# Patient Record
Sex: MALE | Race: WHITE | Employment: OTHER | URBAN - METROPOLITAN AREA
[De-identification: names, ages, dates, MRNs, and addresses within clinical notes are randomized per-mention and may not be internally consistent; named-entity substitution may affect disease eponyms.]

---

## 2018-12-20 PROBLEM — D75.1 POLYCYTHEMIA: Status: ACTIVE | Noted: 2018-12-20

## 2019-04-04 DIAGNOSIS — D75.1 POLYCYTHEMIA: ICD-10-CM

## 2019-04-04 LAB
ALBUMIN SERPL-MCNC: 4.2 G/DL (ref 3.5–4.6)
ALP BLD-CCNC: 58 U/L (ref 35–104)
ALT SERPL-CCNC: 31 U/L (ref 0–41)
ANION GAP SERPL CALCULATED.3IONS-SCNC: 15 MEQ/L (ref 9–15)
AST SERPL-CCNC: 35 U/L (ref 0–40)
BILIRUB SERPL-MCNC: 0.3 MG/DL (ref 0.2–0.7)
BUN BLDV-MCNC: 22 MG/DL (ref 8–23)
CALCIUM SERPL-MCNC: 9.3 MG/DL (ref 8.5–9.9)
CHLORIDE BLD-SCNC: 103 MEQ/L (ref 95–107)
CO2: 23 MEQ/L (ref 20–31)
CREAT SERPL-MCNC: 1.21 MG/DL (ref 0.7–1.2)
GFR AFRICAN AMERICAN: >60
GFR NON-AFRICAN AMERICAN: 57.4
GLOBULIN: 3 G/DL (ref 2.3–3.5)
GLUCOSE BLD-MCNC: 93 MG/DL (ref 70–99)
POTASSIUM SERPL-SCNC: 4.8 MEQ/L (ref 3.4–4.9)
SODIUM BLD-SCNC: 141 MEQ/L (ref 135–144)
TOTAL PROTEIN: 7.2 G/DL (ref 6.3–8)

## 2020-03-25 PROBLEM — D75.1 POLYCYTHEMIA: Status: RESOLVED | Noted: 2018-12-20 | Resolved: 2020-03-24

## 2020-12-01 ENCOUNTER — NURSE ONLY (OUTPATIENT)
Dept: PRIMARY CARE CLINIC | Age: 85
End: 2020-12-01

## 2021-01-26 PROBLEM — M48.062 SPINAL STENOSIS OF LUMBAR REGION WITH NEUROGENIC CLAUDICATION: Status: ACTIVE | Noted: 2021-01-26

## 2021-02-02 ENCOUNTER — NURSE ONLY (OUTPATIENT)
Dept: PRIMARY CARE CLINIC | Age: 86
End: 2021-02-02

## 2021-02-02 ENCOUNTER — HOSPITAL ENCOUNTER (OUTPATIENT)
Dept: PREADMISSION TESTING | Age: 86
Discharge: HOME | End: 2021-02-06
Payer: MEDICARE

## 2021-02-02 VITALS
OXYGEN SATURATION: 98 % | RESPIRATION RATE: 16 BRPM | BODY MASS INDEX: 38.07 KG/M2 | HEIGHT: 68 IN | WEIGHT: 251.2 LBS | SYSTOLIC BLOOD PRESSURE: 149 MMHG | TEMPERATURE: 98.1 F | HEART RATE: 70 BPM | DIASTOLIC BLOOD PRESSURE: 64 MMHG

## 2021-02-02 DIAGNOSIS — Z86.718 HX OF BLOOD CLOTS: Chronic | ICD-10-CM

## 2021-02-02 DIAGNOSIS — Z87.891 FORMER SMOKER: Chronic | ICD-10-CM

## 2021-02-02 PROBLEM — I73.9 INTERMITTENT CLAUDICATION (HCC): Status: ACTIVE | Noted: 2018-12-10

## 2021-02-02 PROBLEM — G45.9 TRANSIENT ISCHEMIC ATTACK: Status: ACTIVE | Noted: 2019-03-26

## 2021-02-02 PROBLEM — G47.30 SLEEP APNEA: Status: ACTIVE | Noted: 2021-02-02

## 2021-02-02 PROBLEM — I70.213 ATHEROSCLEROSIS OF NATIVE ARTERY OF BOTH LOWER EXTREMITIES WITH INTERMITTENT CLAUDICATION (HCC): Status: ACTIVE | Noted: 2021-02-02

## 2021-02-02 PROBLEM — I48.91 ATRIAL FIBRILLATION (HCC): Status: ACTIVE | Noted: 2021-02-02

## 2021-02-02 PROBLEM — L71.9 ROSACEA: Status: ACTIVE | Noted: 2018-01-22

## 2021-02-02 PROBLEM — I87.2 VENOUS INSUFFICIENCY: Status: ACTIVE | Noted: 2021-02-02

## 2021-02-02 PROBLEM — G47.19 EXCESSIVE DAYTIME SLEEPINESS: Status: ACTIVE | Noted: 2021-02-02

## 2021-02-02 LAB
ANION GAP SERPL CALCULATED.3IONS-SCNC: 11 MEQ/L (ref 9–15)
BUN BLDV-MCNC: 28 MG/DL (ref 8–23)
CALCIUM SERPL-MCNC: 9.3 MG/DL (ref 8.5–9.9)
CHLORIDE BLD-SCNC: 106 MEQ/L (ref 95–107)
CO2: 24 MEQ/L (ref 20–31)
CREAT SERPL-MCNC: 1.02 MG/DL (ref 0.7–1.2)
EKG ATRIAL RATE: 68 BPM
EKG P AXIS: 51 DEGREES
EKG P-R INTERVAL: 224 MS
EKG Q-T INTERVAL: 382 MS
EKG QRS DURATION: 80 MS
EKG QTC CALCULATION (BAZETT): 406 MS
EKG R AXIS: 43 DEGREES
EKG T AXIS: 50 DEGREES
EKG VENTRICULAR RATE: 68 BPM
GFR AFRICAN AMERICAN: >60
GFR NON-AFRICAN AMERICAN: >60
GLUCOSE BLD-MCNC: 141 MG/DL (ref 70–99)
HCT VFR BLD CALC: 44.8 % (ref 42–52)
HEMOGLOBIN: 14.8 G/DL (ref 14–18)
INR BLD: 1.9
MCH RBC QN AUTO: 30 PG (ref 27–31.3)
MCHC RBC AUTO-ENTMCNC: 33 % (ref 33–37)
MCV RBC AUTO: 91 FL (ref 80–100)
PDW BLD-RTO: 15.2 % (ref 11.5–14.5)
PLATELET # BLD: 209 K/UL (ref 130–400)
POTASSIUM SERPL-SCNC: 4.2 MEQ/L (ref 3.4–4.9)
PROTHROMBIN TIME: 21.6 SEC (ref 12.3–14.9)
RBC # BLD: 4.92 M/UL (ref 4.7–6.1)
SODIUM BLD-SCNC: 141 MEQ/L (ref 135–144)
WBC # BLD: 6.8 K/UL (ref 4.8–10.8)

## 2021-02-02 PROCEDURE — 86850 RBC ANTIBODY SCREEN: CPT

## 2021-02-02 PROCEDURE — 86901 BLOOD TYPING SEROLOGIC RH(D): CPT

## 2021-02-02 PROCEDURE — 85610 PROTHROMBIN TIME: CPT

## 2021-02-02 PROCEDURE — 93005 ELECTROCARDIOGRAM TRACING: CPT | Performed by: NURSE PRACTITIONER

## 2021-02-02 PROCEDURE — 86900 BLOOD TYPING SEROLOGIC ABO: CPT

## 2021-02-02 PROCEDURE — 85027 COMPLETE CBC AUTOMATED: CPT

## 2021-02-02 PROCEDURE — 80048 BASIC METABOLIC PNL TOTAL CA: CPT

## 2021-02-02 RX ORDER — SODIUM CHLORIDE 0.9 % (FLUSH) 0.9 %
10 SYRINGE (ML) INJECTION PRN
Status: CANCELLED | OUTPATIENT
Start: 2021-02-08

## 2021-02-02 RX ORDER — NITROGLYCERIN 0.4 MG/1
0.4 TABLET SUBLINGUAL EVERY 5 MIN PRN
COMMUNITY

## 2021-02-02 RX ORDER — SODIUM CHLORIDE, SODIUM LACTATE, POTASSIUM CHLORIDE, CALCIUM CHLORIDE 600; 310; 30; 20 MG/100ML; MG/100ML; MG/100ML; MG/100ML
INJECTION, SOLUTION INTRAVENOUS CONTINUOUS
Status: CANCELLED | OUTPATIENT
Start: 2021-02-08

## 2021-02-02 RX ORDER — SODIUM CHLORIDE, SODIUM LACTATE, POTASSIUM CHLORIDE, CALCIUM CHLORIDE 600; 310; 30; 20 MG/100ML; MG/100ML; MG/100ML; MG/100ML
INJECTION, SOLUTION INTRAVENOUS ONCE
Status: CANCELLED | OUTPATIENT
Start: 2021-02-08

## 2021-02-02 RX ORDER — CEFAZOLIN SODIUM 2 G/50ML
2000 SOLUTION INTRAVENOUS ONCE
Status: CANCELLED | OUTPATIENT
Start: 2021-02-08

## 2021-02-02 RX ORDER — LIDOCAINE HYDROCHLORIDE 10 MG/ML
1 INJECTION, SOLUTION EPIDURAL; INFILTRATION; INTRACAUDAL; PERINEURAL
Status: CANCELLED | OUTPATIENT
Start: 2021-02-08 | End: 2021-02-08

## 2021-02-02 RX ORDER — PRAVASTATIN SODIUM 20 MG
TABLET ORAL EVERY EVENING
COMMUNITY
Start: 2020-12-01 | End: 2022-08-08 | Stop reason: ALTCHOICE

## 2021-02-02 RX ORDER — SODIUM CHLORIDE 0.9 % (FLUSH) 0.9 %
10 SYRINGE (ML) INJECTION EVERY 12 HOURS SCHEDULED
Status: CANCELLED | OUTPATIENT
Start: 2021-02-08

## 2021-02-02 RX ORDER — AMOXICILLIN 500 MG
CAPSULE ORAL DAILY
COMMUNITY

## 2021-02-02 RX ORDER — FINASTERIDE 5 MG/1
5 TABLET, FILM COATED ORAL DAILY
COMMUNITY
End: 2021-02-02

## 2021-02-02 RX ORDER — TIZANIDINE 4 MG/1
4 TABLET ORAL NIGHTLY
COMMUNITY
End: 2022-07-25

## 2021-02-02 RX ORDER — WARFARIN SODIUM 2.5 MG/1
4 TABLET ORAL DAILY
COMMUNITY

## 2021-02-02 RX ORDER — LOSARTAN POTASSIUM 50 MG/1
50 TABLET ORAL DAILY
COMMUNITY
End: 2022-07-25

## 2021-02-02 RX ORDER — DUTASTERIDE 0.5 MG/1
CAPSULE, LIQUID FILLED ORAL DAILY
COMMUNITY

## 2021-02-02 ASSESSMENT — ENCOUNTER SYMPTOMS
EYES NEGATIVE: 1
CONSTIPATION: 0
SHORTNESS OF BREATH: 1
NAUSEA: 0
SORE THROAT: 0
ABDOMINAL PAIN: 0
CHEST TIGHTNESS: 0
COUGH: 0
DIARRHEA: 0
VOMITING: 0
TROUBLE SWALLOWING: 0
BACK PAIN: 1
STRIDOR: 0
WHEEZING: 0
ALLERGIC/IMMUNOLOGIC NEGATIVE: 1

## 2021-02-02 NOTE — H&P
Nurse Practitioner History and Physical      CHIEF COMPLAINT:  Back pain    HISTORY OF PRESENT ILLNESS:      The patient is a 82 y.o. male with significant past medical history of lumbar stenosis who presents for right bilateral lumbar 2 - 3, 3 - 4, 4 - 5 micro decompression. Difficult ambulation & progressively worsening. Low back pain radiates to both legs -- right leg pain worse than left leg pain. Uses a cane for ambulation. Scheduled for OR. Past Medical History:        Diagnosis Date    Baker's cyst of knee, left     Coronary artery disease     HTN (hypertension)     Hyperlipidemia     Polycythemia     Polycythemia rubra vera ([facility] [address]     Torn meniscus     left knee     Past Surgical History:    Past Surgical History:   Procedure Laterality Date    COLONOSCOPY  2009    COLONOSCOPY  2012    CORONARY ANGIOPLASTY WITH STENT PLACEMENT  10/2006    HERNIA REPAIR  03/2013    redo ca mesh in Oct.2013    JOINT REPLACEMENT Right 2011    KNEE SURGERY Left      arthroscopic surgery to repair meniscus of left knee    TONSILLECTOMY  age 8   [address]  03/2012         Medications Prior to Admission:    Current Outpatient Medications   Medication Sig Dispense Refill    HYDROcodone-acetaminophen (VICODIN) 5-500 MG per tablet       carvedilol (COREG) 3.125 MG tablet       AVODART 0.5 MG capsule       predniSONE (DELTASONE) 20 MG tablet       oxyCODONE-acetaminophen (PERCOCET) 5-325 MG per tablet       tamsulosin (FLOMAX) 0.4 MG capsule       warfarin (COUMADIN) 5 MG tablet       amlodipine (NORVASC) 5 MG tablet Take 5 mg by mouth daily.  doxazosin (CARDURA) 1 MG tablet Take 1 mg by mouth nightly.  Dutasteride (AVODART PO) Take  by mouth.  atorvastatin (LIPITOR) 20 MG tablet Take 20 mg by mouth daily.  furosemide (LASIX) 20 MG tablet Take 20 mg by mouth 2 times daily.  aspirin 81 MG EC tablet Take 81 mg by mouth daily.          No current facility-administered medications for this encounter.       Facility-Administered Medications Ordered in Other Encounters   Medication Dose Route Frequency Provider Last Rate Last Admin    sodium chloride flush 0.9 % injection 10 mL  10 mL Intravenous PRN Elier Comer MD   10 mL at 07/10/19 1220       Allergies:  Acetaminophen and Benadryl [diphenhydramine hcl]    Social History:   Social History     Socioeconomic History    Marital status:      Spouse name: Not on file    Number of children: Not on file    Years of education: Not on file    Highest education level: Not on file   Occupational History    Not on file   Social Needs    Financial resource strain: Not on file    Food insecurity     Worry: Not on file     Inability: Not on file    Transportation needs     Medical: Not on file     Non-medical: Not on file   Tobacco Use    Smoking status: Former Smoker     Quit date: 1968     Years since quittin.8    Smokeless tobacco: Never Used   Substance and Sexual Activity    Alcohol use: Not on file    Drug use: Not on file    Sexual activity: Not on file   Lifestyle    Physical activity     Days per week: Not on file     Minutes per session: Not on file    Stress: Not on file   Relationships    Social connections     Talks on phone: Not on file     Gets together: Not on file     Attends Hoahaoism service: Not on file     Active member of club or organization: Not on file     Attends meetings of clubs or organizations: Not on file     Relationship status: Not on file    Intimate partner violence     Fear of current or ex partner: Not on file     Emotionally abused: Not on file     Physically abused: Not on file     Forced sexual activity: Not on file   Other Topics Concern    Not on file   Social History Narrative    Not on file       Family History:       Problem Relation Age of Onset    Heart Attack Mother     Colon Cancer Other     Colon Cancer Daughter     Breast Cancer Other        Review of Systems   Constitutional: Negative for chills and fever. HENT: Negative for sore throat, tinnitus and trouble swallowing. Eyes: Negative. Negative for visual disturbance. IOL OU   Respiratory: Positive for shortness of breath. Negative for cough, chest tightness, wheezing and stridor. Cardiovascular: Negative for chest pain and palpitations. Gastrointestinal: Negative for abdominal pain, constipation, diarrhea, nausea and vomiting. Genitourinary: Negative for dysuria and frequency. BPH   Musculoskeletal: Positive for back pain (radiates to both legs. ). Negative for myalgias and neck pain. Skin: Negative. Allergic/Immunologic: Negative. Neurological: Negative. Negative for seizures and headaches. Hematological: Negative. Psychiatric/Behavioral: Negative. Vitals: There were no vitals taken for this visit. Physical Exam  Constitutional:       Appearance: He is well-developed. He is obese. HENT:      Head: Normocephalic and atraumatic. Right Ear: External ear normal. There is impacted cerumen. Left Ear: External ear normal. There is impacted cerumen. Mouth/Throat:      Mouth: Mucous membranes are dry. Comments: Full upper & lower dentures -- ill fitting. Eyes:      General: No scleral icterus. Extraocular Movements: Extraocular movements intact. Conjunctiva/sclera: Conjunctivae normal.      Pupils: Pupils are equal, round, and reactive to light. Comments: IOL OU   Neck:      Musculoskeletal: Normal range of motion and neck supple. Thyroid: No thyromegaly. Trachea: No tracheal deviation. Cardiovascular:      Rate and Rhythm: Normal rate and regular rhythm. Heart sounds: Normal heart sounds. Pulmonary:      Effort: Pulmonary effort is normal. No respiratory distress. Breath sounds: Normal breath sounds. No wheezing or rales.    Abdominal:      General: Bowel sounds are normal. There is no distension. Palpations: Abdomen is soft. There is no mass. Tenderness: There is no abdominal tenderness. Comments: Obese abdomen. Genitourinary:     Comments: Deferred. Musculoskeletal: Normal range of motion. General: No tenderness. Right lower leg: Edema present. Left lower leg: Edema (elastic hose LLE) present. Lymphadenopathy:      Cervical: No cervical adenopathy. Skin:     General: Skin is warm. Findings: No erythema or rash. Neurological:      Mental Status: He is alert and oriented to person, place, and time. Gait: Gait abnormal (painful wobbling ambulation. ). Psychiatric:         Mood and Affect: Mood normal.         Behavior: Behavior normal.         Thought Content: Thought content normal.         Judgment: Judgment normal.         [unfilled]    Assessment:  Patient Active Problem List   Diagnosis    Ventral hernia    Ventral hernia, recurrent    Polycythemia    Spinal stenosis of lumbar region with neurogenic claudication         Plan:  Scheduled for right bilateral lumbar 2 - 3, 3 - 4, 4 - 5 micro decompression. EDELMIRA Huffman - CNP  2/2/2021  12:44 PM     Entire history and physical have been reviewed including all laboratory studies images. Updates and changes have been noted. Discussed with the patient. All questions answered.

## 2021-02-02 NOTE — PROGRESS NOTES
covid test to be done 2/2/2021 & will self quarantine until OR 2/8/2021. Dianelys-Hex wash instructions given -- to be done Saturday 2/6/2021 & Sunday 2/7/2021. covid positive 11/2020. Last dose Coumadin & Aspirin 1/29/2021. covid vaccine [employer] ) injection 2/2/2021. Cardiac clearance by Dr. Isaac Button dated 1/28/2021 -- paper copy on chart. Last cardiac appointment notes [facility] ) dated 12/1/2020 -- paper copy on chart.

## 2021-02-02 NOTE — PROGRESS NOTES
covid test to be done 2/2/2021 & will self quarantine until OR 2/8/2021. Dianelys-Hex wash instructions given -- to be done Saturday 2/6/2021 & Sunday 2/7/2021. covid positive 11/2020. Last dose Coumadin & Aspirin 1/29/2021.

## 2021-02-03 LAB
ABO/RH: NORMAL
ANTIBODY SCREEN: NORMAL

## 2021-02-03 PROCEDURE — 93010 ELECTROCARDIOGRAM REPORT: CPT | Performed by: INTERNAL MEDICINE

## 2021-02-08 ENCOUNTER — ANESTHESIA EVENT (OUTPATIENT)
Dept: OPERATING ROOM | Age: 86
End: 2021-02-08
Payer: MEDICARE

## 2021-02-08 ENCOUNTER — HOSPITAL ENCOUNTER (OUTPATIENT)
Dept: GENERAL RADIOLOGY | Age: 86
Setting detail: OUTPATIENT SURGERY
Discharge: HOME | End: 2021-02-10
Attending: NEUROLOGICAL SURGERY
Payer: MEDICARE

## 2021-02-08 ENCOUNTER — HOSPITAL ENCOUNTER (OUTPATIENT)
Age: 86
Discharge: HOME | End: 2021-02-09
Attending: NEUROLOGICAL SURGERY | Admitting: NEUROLOGICAL SURGERY
Payer: MEDICARE

## 2021-02-08 ENCOUNTER — ANESTHESIA (OUTPATIENT)
Dept: OPERATING ROOM | Age: 86
End: 2021-02-08
Payer: MEDICARE

## 2021-02-08 VITALS — TEMPERATURE: 93.9 F | SYSTOLIC BLOOD PRESSURE: 136 MMHG | OXYGEN SATURATION: 96 % | DIASTOLIC BLOOD PRESSURE: 86 MMHG

## 2021-02-08 DIAGNOSIS — R52 PAIN: ICD-10-CM

## 2021-02-08 PROBLEM — M48.062 LUMBAR STENOSIS WITH NEUROGENIC CLAUDICATION: Status: ACTIVE | Noted: 2021-02-08

## 2021-02-08 LAB
INR BLD: 1
PROTHROMBIN TIME: 12.9 SEC (ref 12.3–14.9)

## 2021-02-08 PROCEDURE — 85610 PROTHROMBIN TIME: CPT

## 2021-02-08 PROCEDURE — 6360000002 HC RX W HCPCS: Performed by: NURSE PRACTITIONER

## 2021-02-08 PROCEDURE — 6360000002 HC RX W HCPCS: Performed by: ANESTHESIOLOGY

## 2021-02-08 PROCEDURE — 6360000002 HC RX W HCPCS: Performed by: NEUROLOGICAL SURGERY

## 2021-02-08 PROCEDURE — 3700000001 HC ADD 15 MINUTES (ANESTHESIA): Performed by: NEUROLOGICAL SURGERY

## 2021-02-08 PROCEDURE — 6370000000 HC RX 637 (ALT 250 FOR IP): Performed by: NEUROLOGICAL SURGERY

## 2021-02-08 PROCEDURE — 6370000000 HC RX 637 (ALT 250 FOR IP): Performed by: NURSE PRACTITIONER

## 2021-02-08 PROCEDURE — 3600000004 HC SURGERY LEVEL 4 BASE: Performed by: NEUROLOGICAL SURGERY

## 2021-02-08 PROCEDURE — 2709999900 HC NON-CHARGEABLE SUPPLY: Performed by: NEUROLOGICAL SURGERY

## 2021-02-08 PROCEDURE — 6360000002 HC RX W HCPCS

## 2021-02-08 PROCEDURE — 2500000003 HC RX 250 WO HCPCS: Performed by: NEUROLOGICAL SURGERY

## 2021-02-08 PROCEDURE — 7100000000 HC PACU RECOVERY - FIRST 15 MIN: Performed by: NEUROLOGICAL SURGERY

## 2021-02-08 PROCEDURE — 2500000003 HC RX 250 WO HCPCS: Performed by: ANESTHESIOLOGY

## 2021-02-08 PROCEDURE — 2780000010 HC IMPLANT OTHER: Performed by: NEUROLOGICAL SURGERY

## 2021-02-08 PROCEDURE — 64999 UNLISTED PX NERVOUS SYSTEM: CPT

## 2021-02-08 PROCEDURE — 2580000003 HC RX 258: Performed by: NURSE PRACTITIONER

## 2021-02-08 PROCEDURE — 2500000003 HC RX 250 WO HCPCS

## 2021-02-08 PROCEDURE — 3700000000 HC ANESTHESIA ATTENDED CARE: Performed by: NEUROLOGICAL SURGERY

## 2021-02-08 PROCEDURE — 2720000010 HC SURG SUPPLY STERILE: Performed by: NEUROLOGICAL SURGERY

## 2021-02-08 PROCEDURE — 76942 ECHO GUIDE FOR BIOPSY: CPT | Performed by: ANESTHESIOLOGY

## 2021-02-08 PROCEDURE — 7100000001 HC PACU RECOVERY - ADDTL 15 MIN: Performed by: NEUROLOGICAL SURGERY

## 2021-02-08 PROCEDURE — 2580000003 HC RX 258: Performed by: NEUROLOGICAL SURGERY

## 2021-02-08 PROCEDURE — 3209999900 FLUORO FOR SURGICAL PROCEDURES

## 2021-02-08 PROCEDURE — 3600000014 HC SURGERY LEVEL 4 ADDTL 15MIN: Performed by: NEUROLOGICAL SURGERY

## 2021-02-08 RX ORDER — TAMSULOSIN HYDROCHLORIDE 0.4 MG/1
0.4 CAPSULE ORAL DAILY
Status: DISCONTINUED | OUTPATIENT
Start: 2021-02-08 | End: 2021-02-09 | Stop reason: HOSPADM

## 2021-02-08 RX ORDER — LOSARTAN POTASSIUM 50 MG/1
50 TABLET ORAL DAILY
Status: DISCONTINUED | OUTPATIENT
Start: 2021-02-08 | End: 2021-02-09 | Stop reason: HOSPADM

## 2021-02-08 RX ORDER — SODIUM CHLORIDE 9 MG/ML
INJECTION, SOLUTION INTRAVENOUS CONTINUOUS
Status: DISCONTINUED | OUTPATIENT
Start: 2021-02-08 | End: 2021-02-09 | Stop reason: HOSPADM

## 2021-02-08 RX ORDER — LIDOCAINE HYDROCHLORIDE AND EPINEPHRINE 10; 10 MG/ML; UG/ML
INJECTION, SOLUTION INFILTRATION; PERINEURAL PRN
Status: DISCONTINUED | OUTPATIENT
Start: 2021-02-08 | End: 2021-02-08 | Stop reason: ALTCHOICE

## 2021-02-08 RX ORDER — BUPIVACAINE HYDROCHLORIDE 2.5 MG/ML
INJECTION, SOLUTION EPIDURAL; INFILTRATION; INTRACAUDAL
Status: COMPLETED | OUTPATIENT
Start: 2021-02-08 | End: 2021-02-08

## 2021-02-08 RX ORDER — SODIUM CHLORIDE, SODIUM LACTATE, POTASSIUM CHLORIDE, CALCIUM CHLORIDE 600; 310; 30; 20 MG/100ML; MG/100ML; MG/100ML; MG/100ML
INJECTION, SOLUTION INTRAVENOUS ONCE
Status: COMPLETED | OUTPATIENT
Start: 2021-02-08 | End: 2021-02-08

## 2021-02-08 RX ORDER — CEFAZOLIN SODIUM 2 G/50ML
2000 SOLUTION INTRAVENOUS EVERY 8 HOURS
Status: COMPLETED | OUTPATIENT
Start: 2021-02-08 | End: 2021-02-09

## 2021-02-08 RX ORDER — SODIUM CHLORIDE, SODIUM LACTATE, POTASSIUM CHLORIDE, CALCIUM CHLORIDE 600; 310; 30; 20 MG/100ML; MG/100ML; MG/100ML; MG/100ML
INJECTION, SOLUTION INTRAVENOUS CONTINUOUS
Status: DISCONTINUED | OUTPATIENT
Start: 2021-02-08 | End: 2021-02-08

## 2021-02-08 RX ORDER — FUROSEMIDE 20 MG/1
20 TABLET ORAL DAILY
Status: DISCONTINUED | OUTPATIENT
Start: 2021-02-08 | End: 2021-02-09 | Stop reason: HOSPADM

## 2021-02-08 RX ORDER — SUCCINYLCHOLINE/SOD CL,ISO/PF 100 MG/5ML
SYRINGE (ML) INTRAVENOUS PRN
Status: DISCONTINUED | OUTPATIENT
Start: 2021-02-08 | End: 2021-02-08 | Stop reason: SDUPTHER

## 2021-02-08 RX ORDER — ROCURONIUM BROMIDE 10 MG/ML
INJECTION, SOLUTION INTRAVENOUS PRN
Status: DISCONTINUED | OUTPATIENT
Start: 2021-02-08 | End: 2021-02-08 | Stop reason: SDUPTHER

## 2021-02-08 RX ORDER — DEXAMETHASONE SODIUM PHOSPHATE 10 MG/ML
INJECTION INTRAMUSCULAR; INTRAVENOUS PRN
Status: DISCONTINUED | OUTPATIENT
Start: 2021-02-08 | End: 2021-02-08 | Stop reason: SDUPTHER

## 2021-02-08 RX ORDER — HYDROCODONE BITARTRATE AND ACETAMINOPHEN 5; 325 MG/1; MG/1
1 TABLET ORAL PRN
Status: DISCONTINUED | OUTPATIENT
Start: 2021-02-08 | End: 2021-02-08 | Stop reason: HOSPADM

## 2021-02-08 RX ORDER — ASPIRIN 81 MG/1
81 TABLET ORAL DAILY
Status: DISCONTINUED | OUTPATIENT
Start: 2021-02-09 | End: 2021-02-09 | Stop reason: HOSPADM

## 2021-02-08 RX ORDER — ONDANSETRON 2 MG/ML
INJECTION INTRAMUSCULAR; INTRAVENOUS PRN
Status: DISCONTINUED | OUTPATIENT
Start: 2021-02-08 | End: 2021-02-08 | Stop reason: SDUPTHER

## 2021-02-08 RX ORDER — ONDANSETRON 2 MG/ML
4 INJECTION INTRAMUSCULAR; INTRAVENOUS EVERY 6 HOURS PRN
Status: DISCONTINUED | OUTPATIENT
Start: 2021-02-08 | End: 2021-02-09 | Stop reason: HOSPADM

## 2021-02-08 RX ORDER — POLYETHYLENE GLYCOL 3350 17 G/17G
17 POWDER, FOR SOLUTION ORAL DAILY
Status: DISCONTINUED | OUTPATIENT
Start: 2021-02-08 | End: 2021-02-09 | Stop reason: HOSPADM

## 2021-02-08 RX ORDER — FINASTERIDE 5 MG/1
5 TABLET, FILM COATED ORAL DAILY
Status: DISCONTINUED | OUTPATIENT
Start: 2021-02-08 | End: 2021-02-09 | Stop reason: HOSPADM

## 2021-02-08 RX ORDER — FENTANYL CITRATE 50 UG/ML
25 INJECTION, SOLUTION INTRAMUSCULAR; INTRAVENOUS EVERY 5 MIN PRN
Status: DISCONTINUED | OUTPATIENT
Start: 2021-02-08 | End: 2021-02-08 | Stop reason: HOSPADM

## 2021-02-08 RX ORDER — ACETAMINOPHEN 325 MG/1
650 TABLET ORAL EVERY 4 HOURS PRN
Status: DISCONTINUED | OUTPATIENT
Start: 2021-02-08 | End: 2021-02-09 | Stop reason: HOSPADM

## 2021-02-08 RX ORDER — CEFAZOLIN SODIUM 2 G/50ML
2000 SOLUTION INTRAVENOUS ONCE
Status: COMPLETED | OUTPATIENT
Start: 2021-02-08 | End: 2021-02-08

## 2021-02-08 RX ORDER — METOCLOPRAMIDE HYDROCHLORIDE 5 MG/ML
10 INJECTION INTRAMUSCULAR; INTRAVENOUS
Status: DISCONTINUED | OUTPATIENT
Start: 2021-02-08 | End: 2021-02-08 | Stop reason: HOSPADM

## 2021-02-08 RX ORDER — SODIUM CHLORIDE 0.9 % (FLUSH) 0.9 %
10 SYRINGE (ML) INJECTION PRN
Status: DISCONTINUED | OUTPATIENT
Start: 2021-02-08 | End: 2021-02-09 | Stop reason: HOSPADM

## 2021-02-08 RX ORDER — SENNA AND DOCUSATE SODIUM 50; 8.6 MG/1; MG/1
1 TABLET, FILM COATED ORAL 2 TIMES DAILY
Status: DISCONTINUED | OUTPATIENT
Start: 2021-02-08 | End: 2021-02-09 | Stop reason: HOSPADM

## 2021-02-08 RX ORDER — LIDOCAINE HYDROCHLORIDE 10 MG/ML
1 INJECTION, SOLUTION EPIDURAL; INFILTRATION; INTRACAUDAL; PERINEURAL
Status: DISCONTINUED | OUTPATIENT
Start: 2021-02-08 | End: 2021-02-08 | Stop reason: HOSPADM

## 2021-02-08 RX ORDER — ONDANSETRON 2 MG/ML
4 INJECTION INTRAMUSCULAR; INTRAVENOUS
Status: DISCONTINUED | OUTPATIENT
Start: 2021-02-08 | End: 2021-02-08 | Stop reason: HOSPADM

## 2021-02-08 RX ORDER — MAGNESIUM HYDROXIDE 1200 MG/15ML
LIQUID ORAL CONTINUOUS PRN
Status: COMPLETED | OUTPATIENT
Start: 2021-02-08 | End: 2021-02-08

## 2021-02-08 RX ORDER — 0.9 % SODIUM CHLORIDE 0.9 %
500 INTRAVENOUS SOLUTION INTRAVENOUS
Status: DISCONTINUED | OUTPATIENT
Start: 2021-02-08 | End: 2021-02-08 | Stop reason: HOSPADM

## 2021-02-08 RX ORDER — ONDANSETRON 4 MG/1
4 TABLET, ORALLY DISINTEGRATING ORAL EVERY 8 HOURS PRN
Status: DISCONTINUED | OUTPATIENT
Start: 2021-02-08 | End: 2021-02-09 | Stop reason: HOSPADM

## 2021-02-08 RX ORDER — SODIUM CHLORIDE 0.9 % (FLUSH) 0.9 %
10 SYRINGE (ML) INJECTION PRN
Status: DISCONTINUED | OUTPATIENT
Start: 2021-02-08 | End: 2021-02-08 | Stop reason: HOSPADM

## 2021-02-08 RX ORDER — LIDOCAINE HYDROCHLORIDE 20 MG/ML
INJECTION, SOLUTION INTRAVENOUS PRN
Status: DISCONTINUED | OUTPATIENT
Start: 2021-02-08 | End: 2021-02-08 | Stop reason: SDUPTHER

## 2021-02-08 RX ORDER — MEPERIDINE HYDROCHLORIDE 25 MG/ML
12.5 INJECTION INTRAMUSCULAR; INTRAVENOUS; SUBCUTANEOUS EVERY 5 MIN PRN
Status: DISCONTINUED | OUTPATIENT
Start: 2021-02-08 | End: 2021-02-08 | Stop reason: HOSPADM

## 2021-02-08 RX ORDER — HYDROCODONE BITARTRATE AND ACETAMINOPHEN 5; 325 MG/1; MG/1
2 TABLET ORAL PRN
Status: DISCONTINUED | OUTPATIENT
Start: 2021-02-08 | End: 2021-02-08 | Stop reason: HOSPADM

## 2021-02-08 RX ORDER — LABETALOL HYDROCHLORIDE 5 MG/ML
5 INJECTION, SOLUTION INTRAVENOUS EVERY 10 MIN PRN
Status: DISCONTINUED | OUTPATIENT
Start: 2021-02-08 | End: 2021-02-08 | Stop reason: HOSPADM

## 2021-02-08 RX ORDER — PRAVASTATIN SODIUM 20 MG
20 TABLET ORAL NIGHTLY
Status: DISCONTINUED | OUTPATIENT
Start: 2021-02-08 | End: 2021-02-09 | Stop reason: HOSPADM

## 2021-02-08 RX ORDER — EPHEDRINE SULFATE 50 MG/ML
INJECTION, SOLUTION INTRAVENOUS PRN
Status: DISCONTINUED | OUTPATIENT
Start: 2021-02-08 | End: 2021-02-08 | Stop reason: SDUPTHER

## 2021-02-08 RX ORDER — TRAMADOL HYDROCHLORIDE 50 MG/1
50 TABLET ORAL EVERY 6 HOURS PRN
Status: DISCONTINUED | OUTPATIENT
Start: 2021-02-08 | End: 2021-02-09 | Stop reason: HOSPADM

## 2021-02-08 RX ORDER — PROPOFOL 10 MG/ML
INJECTION, EMULSION INTRAVENOUS PRN
Status: DISCONTINUED | OUTPATIENT
Start: 2021-02-08 | End: 2021-02-08 | Stop reason: SDUPTHER

## 2021-02-08 RX ORDER — FENTANYL CITRATE 50 UG/ML
INJECTION, SOLUTION INTRAMUSCULAR; INTRAVENOUS PRN
Status: DISCONTINUED | OUTPATIENT
Start: 2021-02-08 | End: 2021-02-08 | Stop reason: SDUPTHER

## 2021-02-08 RX ORDER — SODIUM CHLORIDE 0.9 % (FLUSH) 0.9 %
10 SYRINGE (ML) INJECTION EVERY 12 HOURS SCHEDULED
Status: DISCONTINUED | OUTPATIENT
Start: 2021-02-08 | End: 2021-02-08 | Stop reason: HOSPADM

## 2021-02-08 RX ORDER — SODIUM CHLORIDE 0.9 % (FLUSH) 0.9 %
10 SYRINGE (ML) INJECTION EVERY 12 HOURS SCHEDULED
Status: DISCONTINUED | OUTPATIENT
Start: 2021-02-08 | End: 2021-02-09 | Stop reason: HOSPADM

## 2021-02-08 RX ADMIN — FENTANYL CITRATE 25 MCG: 50 INJECTION, SOLUTION INTRAMUSCULAR; INTRAVENOUS at 14:08

## 2021-02-08 RX ADMIN — DOCUSATE SODIUM 50 MG AND SENNOSIDES 8.6 MG 1 TABLET: 8.6; 5 TABLET, FILM COATED ORAL at 20:36

## 2021-02-08 RX ADMIN — CEFAZOLIN SODIUM 2 G: 2 SOLUTION INTRAVENOUS at 10:46

## 2021-02-08 RX ADMIN — PRAVASTATIN SODIUM 20 MG: 20 TABLET ORAL at 20:36

## 2021-02-08 RX ADMIN — BUPIVACAINE HYDROCHLORIDE 60 ML: 2.5 INJECTION, SOLUTION EPIDURAL; INFILTRATION; INTRACAUDAL at 10:19

## 2021-02-08 RX ADMIN — SODIUM CHLORIDE, POTASSIUM CHLORIDE, SODIUM LACTATE AND CALCIUM CHLORIDE: 600; 310; 30; 20 INJECTION, SOLUTION INTRAVENOUS at 10:28

## 2021-02-08 RX ADMIN — FENTANYL CITRATE 25 MCG: 50 INJECTION, SOLUTION INTRAMUSCULAR; INTRAVENOUS at 11:13

## 2021-02-08 RX ADMIN — LIDOCAINE HYDROCHLORIDE 100 MG: 20 INJECTION, SOLUTION INTRAVENOUS at 10:34

## 2021-02-08 RX ADMIN — PROPOFOL 200 MG: 10 INJECTION, EMULSION INTRAVENOUS at 10:34

## 2021-02-08 RX ADMIN — SODIUM CHLORIDE: 9 INJECTION, SOLUTION INTRAVENOUS at 18:24

## 2021-02-08 RX ADMIN — FENTANYL CITRATE 25 MCG: 50 INJECTION, SOLUTION INTRAMUSCULAR; INTRAVENOUS at 14:24

## 2021-02-08 RX ADMIN — BISACODYL 5 MG: 5 TABLET, COATED ORAL at 18:24

## 2021-02-08 RX ADMIN — TRAMADOL HYDROCHLORIDE 50 MG: 50 TABLET, FILM COATED ORAL at 18:24

## 2021-02-08 RX ADMIN — ROCURONIUM BROMIDE 10 MG: 10 INJECTION INTRAVENOUS at 11:08

## 2021-02-08 RX ADMIN — FENTANYL CITRATE 25 MCG: 50 INJECTION, SOLUTION INTRAMUSCULAR; INTRAVENOUS at 11:46

## 2021-02-08 RX ADMIN — ROCURONIUM BROMIDE 20 MG: 10 INJECTION INTRAVENOUS at 12:22

## 2021-02-08 RX ADMIN — ROCURONIUM BROMIDE 10 MG: 10 INJECTION INTRAVENOUS at 12:56

## 2021-02-08 RX ADMIN — SUGAMMADEX 200 MG: 100 INJECTION, SOLUTION INTRAVENOUS at 14:09

## 2021-02-08 RX ADMIN — CEFAZOLIN SODIUM 2000 MG: 2 SOLUTION INTRAVENOUS at 18:24

## 2021-02-08 RX ADMIN — Medication 100 MG: at 10:34

## 2021-02-08 RX ADMIN — DEXAMETHASONE SODIUM PHOSPHATE 10 MG: 10 INJECTION INTRAMUSCULAR; INTRAVENOUS at 10:42

## 2021-02-08 RX ADMIN — SODIUM CHLORIDE, POTASSIUM CHLORIDE, SODIUM LACTATE AND CALCIUM CHLORIDE: 600; 310; 30; 20 INJECTION, SOLUTION INTRAVENOUS at 10:31

## 2021-02-08 RX ADMIN — ROCURONIUM BROMIDE 20 MG: 10 INJECTION INTRAVENOUS at 11:31

## 2021-02-08 RX ADMIN — ROCURONIUM BROMIDE 50 MG: 10 INJECTION INTRAVENOUS at 10:39

## 2021-02-08 RX ADMIN — EPHEDRINE SULFATE 10 MG: 50 INJECTION, SOLUTION INTRAVENOUS at 11:34

## 2021-02-08 RX ADMIN — SODIUM CHLORIDE, POTASSIUM CHLORIDE, SODIUM LACTATE AND CALCIUM CHLORIDE: 600; 310; 30; 20 INJECTION, SOLUTION INTRAVENOUS at 15:08

## 2021-02-08 RX ADMIN — ONDANSETRON 4 MG: 2 INJECTION INTRAMUSCULAR; INTRAVENOUS at 13:18

## 2021-02-08 RX ADMIN — FENTANYL CITRATE 25 MCG: 50 INJECTION, SOLUTION INTRAMUSCULAR; INTRAVENOUS at 12:05

## 2021-02-08 ASSESSMENT — PULMONARY FUNCTION TESTS
PIF_VALUE: 30
PIF_VALUE: 27
PIF_VALUE: 10
PIF_VALUE: 25
PIF_VALUE: 17
PIF_VALUE: 25
PIF_VALUE: 28
PIF_VALUE: 18
PIF_VALUE: 26
PIF_VALUE: 15
PIF_VALUE: 6
PIF_VALUE: 27
PIF_VALUE: 15
PIF_VALUE: 26
PIF_VALUE: 27
PIF_VALUE: 27
PIF_VALUE: 28
PIF_VALUE: 18
PIF_VALUE: 17
PIF_VALUE: 28
PIF_VALUE: 29
PIF_VALUE: 27
PIF_VALUE: 26
PIF_VALUE: 27
PIF_VALUE: 29
PIF_VALUE: 19
PIF_VALUE: 17
PIF_VALUE: 27
PIF_VALUE: 25
PIF_VALUE: 14
PIF_VALUE: 27
PIF_VALUE: 25
PIF_VALUE: 17
PIF_VALUE: 26
PIF_VALUE: 27
PIF_VALUE: 19
PIF_VALUE: 15
PIF_VALUE: 27
PIF_VALUE: 18
PIF_VALUE: 1
PIF_VALUE: 27
PIF_VALUE: 26
PIF_VALUE: 26
PIF_VALUE: 29
PIF_VALUE: 27
PIF_VALUE: 27
PIF_VALUE: 29
PIF_VALUE: 29
PIF_VALUE: 18
PIF_VALUE: 28
PIF_VALUE: 28
PIF_VALUE: 27
PIF_VALUE: 26
PIF_VALUE: 27
PIF_VALUE: 19
PIF_VALUE: 27
PIF_VALUE: 27
PIF_VALUE: 16
PIF_VALUE: 15
PIF_VALUE: 17
PIF_VALUE: 16
PIF_VALUE: 27
PIF_VALUE: 28
PIF_VALUE: 27
PIF_VALUE: 27
PIF_VALUE: 18
PIF_VALUE: 16
PIF_VALUE: 27
PIF_VALUE: 17
PIF_VALUE: 27
PIF_VALUE: 19
PIF_VALUE: 27
PIF_VALUE: 27
PIF_VALUE: 19
PIF_VALUE: 18
PIF_VALUE: 28
PIF_VALUE: 28
PIF_VALUE: 27
PIF_VALUE: 25
PIF_VALUE: 19
PIF_VALUE: 27
PIF_VALUE: 29
PIF_VALUE: 18
PIF_VALUE: 18
PIF_VALUE: 27
PIF_VALUE: 26
PIF_VALUE: 25
PIF_VALUE: 27
PIF_VALUE: 27
PIF_VALUE: 17
PIF_VALUE: 29
PIF_VALUE: 27
PIF_VALUE: 20
PIF_VALUE: 28
PIF_VALUE: 19
PIF_VALUE: 2
PIF_VALUE: 28
PIF_VALUE: 27
PIF_VALUE: 24
PIF_VALUE: 28
PIF_VALUE: 28
PIF_VALUE: 27
PIF_VALUE: 28
PIF_VALUE: 19
PIF_VALUE: 29

## 2021-02-08 ASSESSMENT — PAIN SCALES - GENERAL
PAINLEVEL_OUTOF10: 5
PAINLEVEL_OUTOF10: 5

## 2021-02-08 NOTE — CONSULTS
Consult Note    Reason for Consult:  Co-management of chronic conditions and medication reconciliation    Requesting Physician:  So Landaverde MD    HISTORY OF PRESENT ILLNESS:    The patient is a 82 y.o. male past medical history of L2-3, L3-4, L4-5 canal stenosis with neurogenic claudication, atrial fibrillation, hypertension, BPH, hyperlipidemia, and polycythemia who presents status post Right and bilateral L2-3, L3-4, L4-5 microdissection and decompression with Dr. Alexy Mendoza. Patient doing well, no complaints of urinary retention, pain well controlled. Patient Seen, Chart, Labs, Radiologystudies, and Consults reviewed. Patient denies headache, chest pain, shortness of breath, N/V/D/C, fever/chills. Past Medical History:   Diagnosis Date    A-fib ([facility] [address]     approx 1 year ago-cardioverted    Arthritis     Baker's cyst of knee, left     Cerebral artery occlusion with cerebral infarction ([facility] [address] 2000    TIA sx felt funny --     Coronary artery disease     HTN (hypertension)     meds > 20 yrs    Hx of blood clots 2012    DVT left leg     Hyperlipidemia     meds > 20 yrs    Polycythemia     Torn meniscus     left knee       Past Surgical History:   Procedure Laterality Date    COLONOSCOPY  2009    COLONOSCOPY  2012    CORONARY ANGIOPLASTY WITH STENT PLACEMENT  10/2006    x 1 cardiac stent    ENDOSCOPY, COLON, DIAGNOSTIC      EYE SURGERY      [place]aco with IOL OU    HERNIA REPAIR  03/2013    redo ca mesh in [id number] -- umbilical hernia    JOINT REPLACEMENT Bilateral 2009 & 2011    Bilateral TKR    KNEE SURGERY Left      arthroscopic surgery to repair meniscus of left knee    TONSILLECTOMY  age 8   [address]  03/2012       Prior to Admission medications    Medication Sig Start Date End Date Taking?  Authorizing Provider   warfarin (COUMADIN) 2.5 MG tablet Take 2.5 mg by mouth daily Indications: T-W-Th-Sat - Sun   Yes Historical Provider, MD pravastatin (PRAVACHOL) 20 MG tablet every evening 12/1/20  Yes Historical Provider, MD   nitroGLYCERIN (NITROSTAT) 0.4 MG SL tablet Place 0.4 mg under the tongue every 5 minutes as needed for Chest pain up to max of 3 total doses. If no relief after 1 dose, call 911. Yes Historical Provider, MD   tiZANidine (ZANAFLEX) 4 MG tablet Take 4 mg by mouth nightly   Yes Historical Provider, MD   Omega-3 Fatty Acids (FISH OIL) 1200 MG CAPS Take by mouth daily   Yes Historical Provider, MD   Cholecalciferol (VITAMIN D3) 125 MCG (5000 UT) TABS Take by mouth daily   Yes Historical Provider, MD   losartan (COZAAR) 50 MG tablet Take 50 mg by mouth daily   Yes Historical Provider, MD   dutasteride (AVODART) 0.5 MG capsule Take by mouth daily   Yes Historical Provider, MD   CPAP Machine MISC by Does not apply route   Yes Historical Provider, MD   predniSONE (DELTASONE) 20 MG tablet Take 20 mg by mouth daily Indications: 2 tabs daily  8/21/13  Yes Historical Provider, MD   tamsulosin (FLOMAX) 0.4 MG capsule Take 0.4 mg by mouth daily  4/19/13  Yes Historical Provider, MD   warfarin (COUMADIN) 5 MG tablet Take 5 mg by mouth Twice a Week Indications: Monday & Friday 2/26/13  Yes Historical Provider, MD   furosemide (LASIX) 20 MG tablet Take 20 mg by mouth daily    Yes Historical Provider, MD   aspirin 81 MG EC tablet Take 81 mg by mouth daily. Yes Historical Provider, MD   traMADol (ULTRAM) 50 MG tablet Take 1 tablet by mouth every 6 hours as needed for Pain for up to 7 days. Intended supply: 7 days.  Take lowest dose possible to manage pain 2/8/21 2/15/21  Bart Kern MD       Scheduled Meds:   sodium chloride flush  10 mL Intravenous 2 times per day    ceFAZolin (ANCEF) IVPB  2,000 mg Intravenous Q8H    polyethylene glycol  17 g Oral Daily    bisacodyl  5 mg Oral Daily    sennosides-docusate sodium  1 tablet Oral BID     Continuous Infusions:   sodium chloride  Other Father          at age 80    Other Sister          as infant   Polly Cushing Other Brother         unknown medical hx    No Known Problems Son     Other Brother          at age 61 due to accident       Review Of Systems:   CONSTITUTIONAL:  negative  EYES:  negative  HEENT:  negative  RESPIRATORY:  negative  CARDIOVASCULAR:  negative  GASTROINTESTINAL:  negative  GENITOURINARY:  negative  INTEGUMENT/BREAST:  negative  HEMATOLOGIC/LYMPHATIC:  negative  ALLERGIC/IMMUNOLOGIC:  negative  ENDOCRINE:  negative  MUSCULOSKELETAL:  positive for  decreased range of motion  NEUROLOGICAL:  negative  BEHAVIOR/PSYCH:  negative    Physical Exam:  Vitals:    21 1540 21 1545 21 1550 21 1555   BP:  (!) 149/74     Pulse: 66 66 67 63   Resp: 15 17 20 15   Temp:       TempSrc:       SpO2: 92% 92% 92% 94%   Weight:       Height:           CONSTITUTIONAL:  awake, alert, cooperative, no apparent distress, and appears stated age  EYES:  Lids and lashes normal, pupils equal, round and reactive to light, extra ocular muscles intact, sclera clear, conjunctiva normal  ENT:  Normocephalic, without obvious abnormality, atraumatic, sinuses nontender on palpation, external ears without lesions, oral pharynx with moist mucus membranes, tonsils without erythema or exudates, gums normal and good dentition. NECK:  Supple, symmetrical, trachea midline, no adenopathy, thyroid symmetric, not enlarged and no tenderness, skin normal  HEMATOLOGIC/LYMPHATICS:  no cervical lymphadenopathy and no supraclavicular lymphadenopathy  BACK: Surgical dressing dry clean and intact. Wound drain in place.    LUNGS:  No increased work of breathing, good air exchange, clear to auscultation bilaterally, no crackles or wheezing  CARDIOVASCULAR:  Normal apical impulse, regular rate and rhythm, normal S1 and S2, no S3 or S4, and no murmur noted ABDOMEN:  No scars, normal bowel sounds, soft, non-distended, non-tender, no masses palpated, no hepatosplenomegally  CHEST/BREASTS:  Breasts symmetrical, skin without lesion(s), no nipple retraction or dimpling, no nipple discharge, no masses palpated, no axillary or supraclavicular adenopathy  MUSCULOSKELETAL:  There is no redness, warmth, or swelling of the joints. Full range of motion noted. Motor strength is 5 out of 5 all extremities bilaterally. Tone is normal.  NEUROLOGIC:  Awake, alert, oriented to name, place and time. Cranial nerves II-XII are grossly intact. Motor is 5 out of 5 bilaterally. Sensory is intact. SKIN:  No jaundice  Labs:  Recent Results (from the past 24 hour(s))   Protime-INR    Collection Time: 02/08/21  9:00 AM   Result Value Ref Range    Protime 12.9 12.3 - 14.9 sec    INR 1.0      Assessment/Plan:  1. status post Right and bilateral L2-3, L3-4, L4-5 microdissection and decompression: Management per neurosurgery  2. A fib: rate controlled. Goal K and Mg 4.0 and 2.0, respectively. Patient to resume Coumadin 3 days postop. ASA to be resumed tomorrow. 3.  Hyperlipidemia: Continue pravastatin  4. Hypertension: Continue Cozaar and Lasix  5. BPH: Continue Flomax and Proscar monitor for signs of urinary retention, I&Os    Electronically signed by EDELMIRA Stubbs CNP on 2/8/21 at 4:46 PM EST    I saw and evaluated the pt. I personally obtained the key and critical portions of the history and physical exam and made additions where appropriate in the documentation.  I reviewed the mid level documentation and agree with assessment and plan that we come up with together    Yee Casper,   Internal Medicine

## 2021-02-08 NOTE — ANESTHESIA PRE PROCEDURE
Department of Anesthesiology  Preprocedure Note       Name:  Marlyn Edward   Age:  82 y.o.  :  1935                                          MRN:  [id number]         Date:  2021      Surgeon: Concetta Bernardo):  Yasir Encarnacion MD    Procedure: Procedure(s):  RIGHT BILATERAL L2-3 3-4 4-5 MICRODECOMPRESSION, 2.5 HRS/ 1 C-ARM, POWER RONGEUR  2ND CASE    Medications prior to admission:   Prior to Admission medications    Medication Sig Start Date End Date Taking? Authorizing Provider   warfarin (COUMADIN) 2.5 MG tablet Take 2.5 mg by mouth daily Indications: T-W--Sat - Sun   Yes Historical Provider, MD   pravastatin (PRAVACHOL) 20 MG tablet every evening 20  Yes Historical Provider, MD   nitroGLYCERIN (NITROSTAT) 0.4 MG SL tablet Place 0.4 mg under the tongue every 5 minutes as needed for Chest pain up to max of 3 total doses. If no relief after 1 dose, call 911.    Yes Historical Provider, MD   tiZANidine (ZANAFLEX) 4 MG tablet Take 4 mg by mouth nightly   Yes Historical Provider, MD   Omega-3 Fatty Acids (FISH OIL) 1200 MG CAPS Take by mouth daily   Yes Historical Provider, MD   Cholecalciferol (VITAMIN D3) 125 MCG (5000 UT) TABS Take by mouth daily   Yes Historical Provider, MD   losartan (COZAAR) 50 MG tablet Take 50 mg by mouth daily   Yes Historical Provider, MD   dutasteride (AVODART) 0.5 MG capsule Take by mouth daily   Yes Historical Provider, MD   CPAP Machine MISC by Does not apply route   Yes Historical Provider, MD   predniSONE (DELTASONE) 20 MG tablet Take 20 mg by mouth daily Indications: 2 tabs daily  13  Yes Historical Provider, MD   tamsulosin (FLOMAX) 0.4 MG capsule Take 0.4 mg by mouth daily  13  Yes Historical Provider, MD   warfarin (COUMADIN) 5 MG tablet Take 5 mg by mouth Twice a Week Indications: Monday & 13  Yes Historical Provider, MD   furosemide (LASIX) 20 MG tablet Take 20 mg by mouth daily    Yes Historical Provider, MD aspirin 81 MG EC tablet Take 81 mg by mouth daily. Yes Historical Provider, MD       Current medications:    Current Facility-Administered Medications   Medication Dose Route Frequency Provider Last Rate Last Admin    lactated ringers infusion   Intravenous Continuous Truxton Mantle, APRN - CNP        lidocaine PF 1 % injection 1 mL  1 mL Intradermal Once PRN Truxton Mantle, APRN - CNP        sodium chloride flush 0.9 % injection 10 mL  10 mL Intravenous 2 times per day Truxton Mantle, APRN - CNP        sodium chloride flush 0.9 % injection 10 mL  10 mL Intravenous PRN Truxton Mantle, APRN - CNP        ceFAZolin (ANCEF) 2000 mg in dextrose 3 % 50 mL IVPB (duplex)  2,000 mg Intravenous Once Truxton Mantle, APRN - CNP        lactated ringers infusion   Intravenous Once Truxton Mantle, APRN - CNP         Facility-Administered Medications Ordered in Other Encounters   Medication Dose Route Frequency Provider Last Rate Last Admin    sodium chloride flush 0.9 % injection 10 mL  10 mL Intravenous PRN Re Strange MD   10 mL at 07/10/19 1220       Allergies:     Allergies   Allergen Reactions    Benadryl [Diphenhydramine Hcl] Anxiety       Problem List:    Patient Active Problem List   Diagnosis Code    Ventral hernia K43.9    Ventral hernia, recurrent K43.2    Polycythemia D75.1    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    Atherosclerosis of native artery of both lower extremities with intermittent claudication (HCC) I70.213    Atrial fibrillation (HCC) I48.91    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.1, N13.8    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)     approx 1 year ago-cardioverted    Arthritis     Baker's cyst of knee, left     Cerebral artery occlusion with cerebral infarction ([facility] [address]     TIA sx felt funny --     Coronary artery disease     HTN (hypertension)     meds > 20 yrs    Hx of blood clots     DVT left leg     Hyperlipidemia     meds > 20 yrs    Polycythemia     Torn meniscus     left knee       Past Surgical History:        Procedure Laterality Date    COLONOSCOPY      COLONOSCOPY      CORONARY ANGIOPLASTY WITH STENT PLACEMENT  10/2006    x 1 cardiac stent    ENDOSCOPY, COLON, DIAGNOSTIC      EYE SURGERY      [place]aco with IOL OU    HERNIA REPAIR  2013    redo ca mesh in [id number] -- umbilical hernia    JOINT REPLACEMENT Bilateral  &     Bilateral TKR    KNEE SURGERY Left      arthroscopic surgery to repair meniscus of left knee    TONSILLECTOMY  age 8   [address]  2012       Social History:    Social History     Tobacco Use    Smoking status: Former Smoker     Packs/day: 0.50     Years: 10.00     Pack years: 5.00     Types: Cigarettes     Quit date: 1968     Years since quittin.8    Smokeless tobacco: Never Used   Substance Use Topics    Alcohol use: Yes     Comment: social                                Counseling given: Not Answered      Vital Signs (Current):   Vitals:    21 0845   BP: (!) 166/77   Pulse: 64   Resp: 16   Temp: 97.9 °F (36.6 °C)   TempSrc: Temporal   SpO2: 97%   Weight: 251 lb (113.9 kg)   Height: 5' 7.5\" (1.715 m)                                              BP Readings from Last 3 Encounters:   21 (!) 166/77   21 (!) 149/64   07/10/19 124/64       NPO Status: Time of last liquid consumption: 1145                        Time of last solid consumption:                         Date of last liquid consumption: 21 Date of last solid food consumption: 02/07/21    BMI:   Wt Readings from Last 3 Encounters:   02/08/21 251 lb (113.9 kg)   02/02/21 251 lb 3.2 oz (113.9 kg)   01/26/21 250 lb (113.4 kg)     Body mass index is 38.73 kg/m². CBC:   Lab Results   Component Value Date    WBC 6.8 02/02/2021    RBC 4.92 02/02/2021    HGB 14.8 02/02/2021    HCT 44.8 02/02/2021    MCV 91.0 02/02/2021    RDW 15.2 02/02/2021     02/02/2021       CMP:   Lab Results   Component Value Date     02/02/2021    K 4.2 02/02/2021     02/02/2021    CO2 24 02/02/2021    BUN 28 02/02/2021    CREATININE 1.02 02/02/2021    GFRAA >60.0 02/02/2021    LABGLOM >60.0 02/02/2021    GLUCOSE 141 02/02/2021    GLUCOSE 85 04/25/2012    PROT 6.9 05/02/2019    CALCIUM 9.3 02/02/2021    BILITOT 0.4 05/02/2019    ALKPHOS 56 05/02/2019    AST 23 05/02/2019    ALT 23 05/02/2019       POC Tests: No results for input(s): POCGLU, POCNA, POCK, POCCL, POCBUN, POCHEMO, POCHCT in the last 72 hours.     Coags:   Lab Results   Component Value Date    PROTIME 12.9 02/08/2021    INR 1.0 02/08/2021    APTT 32.5 10/31/2013       HCG (If Applicable): No results found for: PREGTESTUR, PREGSERUM, HCG, HCGQUANT     ABGs: No results found for: PHART, PO2ART, EIN3GQB, BLS1DIQ, BEART, G4LBLMGI     Type & Screen (If Applicable):  No results found for: LABABO, LABRH    Drug/Infectious Status (If Applicable):  No results found for: HIV, HEPCAB    COVID-19 Screening (If Applicable):   Lab Results   Component Value Date    COVID19 Not Detected 02/02/2021         Anesthesia Evaluation  Patient summary reviewed and Nursing notes reviewed no history of anesthetic complications:   Airway: Mallampati: II  TM distance: >3 FB   Neck ROM: full  Mouth opening: > = 3 FB Dental: normal exam   (+) upper dentures and lower dentures      Pulmonary:Negative Pulmonary ROS and normal exam    (+) sleep apnea: on CPAP, Cardiovascular:Negative CV ROS  Exercise tolerance: good (>4 METS),   (+) hypertension:, CAD:, CABG/stent: no interval change,       ECG reviewed          Cleared by cardiology     Beta Blocker:  Not on Beta Blocker         Neuro/Psych:   Negative Neuro/Psych ROS  (+) TIA,             GI/Hepatic/Renal: Neg GI/Hepatic/Renal ROS            Endo/Other: Negative Endo/Other ROS             Pt had PAT visit. Abdominal:           Vascular: negative vascular ROS. Anesthesia Plan      general and regional     ASA 3     (ETT)  Induction: intravenous. MIPS: Postoperative opioids intended and Prophylactic antiemetics administered. Anesthetic plan and risks discussed with patient. Plan discussed with CRNA.     Attending anesthesiologist reviewed and agrees with Pre Eval content              Amena Hamilton MD   2/8/2021

## 2021-02-08 NOTE — BRIEF OP NOTE
Brief Postoperative Note      Patient: Sony Coronado  YOB: 1935  MRN: [id number]    Date of Procedure: 2/8/2021    Pre-Op Diagnosis: L2-3 L3-4 L4-5 STENOSIS    Post-Op Diagnosis: Same       Procedure(s):  RIGHT BILATERAL L2-3 3-4 4-5 MICRODECOMPRESSION    Surgeon(s):  Eric Newell MD    Assistant:  First Assistant: Chelita Florian    Anesthesia: General    Estimated Blood Loss (mL): less than 50     Complications: None        Drains:   Closed/Suction Drain Inferior Back Accordion 10 Thai (Active)       Findings: Stenosis    Electronically signed by Rajwinder Comer MD on 2/8/2021 at 1:37 PM

## 2021-02-08 NOTE — DISCHARGE INSTR - COC
Continuity of Care Form    Patient Name: Poly Almanza   :  1935  MRN:  [id number]    Admit date:  2021  Discharge date:  2021    Code Status Order: No Order   Advance Directives:   Advance Care Flowsheet Documentation     Date/Time Healthcare Directive Type of Healthcare Directive Copy in [address] Agent's Name Healthcare Agent's Phone Number    21 1576  Yes, patient has an advance directive for healthcare treatment --  No, copy requested from family -- -- --          Admitting Physician:  Isabella Hernandez MD  PCP: Eran Bee MD    Discharging Nurse: [facility] Unit/Room#: [address]  Discharging Unit Phone Number: [phone]    Emergency Contact:   Extended Emergency Contact Information  Primary Emergency Contact: JaeAshleigh  Address: [address] [place], [address] [place] [address] Phone: [phone]  Mobile Phone: [phone]  Relation: Spouse    Past Surgical History:  Past Surgical History:   Procedure Laterality Date    COLONOSCOPY      COLONOSCOPY      CORONARY ANGIOPLASTY WITH STENT PLACEMENT  10/2006    x 1 cardiac stent    ENDOSCOPY, COLON, DIAGNOSTIC      EYE SURGERY      [place] with IOL OU    HERNIA REPAIR  2013    redo ca mesh in [id number] -- umbilical hernia    JOINT REPLACEMENT Bilateral  &     Bilateral TKR    KNEE SURGERY Left      arthroscopic surgery to repair meniscus of left knee    TONSILLECTOMY  age 8   [address]  2012       Immunization History: There is no immunization history on file for this patient.     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    Venous insufficiency I87.2    Intermittent claudication (HCC) I73.9    Transient ischemic attack G45.9    Sleep apnea G47.30    Rosacea L71.9    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    Excessive daytime sleepiness G47.19    Hx of blood clots Z86.718    Former smoker Z87.891    Lumbar stenosis with neurogenic claudication M48.062       Isolation/Infection:   Isolation          No Isolation        Patient Infection Status     Infection Onset Added Last Indicated Last Indicated By Review Planned Expiration Resolved Resolved By    None active    Resolved    COVID-19 20 Covid-19 Ambulatory   12/15/20           Nurse Assessment:  Last Vital Signs: BP (!) 166/77   Pulse 64   Temp 97.9 °F (36.6 °C) (Temporal)   Resp 16   Ht 5' 7.5\" (1.715 m)   Wt 251 lb (113.9 kg)   SpO2 97%   BMI 38.73 kg/m²     Last documented pain score (0-10 scale):    Last Weight:   Wt Readings from Last 1 Encounters:   21 251 lb (113.9 kg)     Mental Status:  oriented and alert    IV Access:  - None    Nursing Mobility/ADLs:  Walking   Independent  Transfer  Independent  Bathing  Independent  Dressing  Independent  Toileting  Independent  Feeding  Independent  Med Admin  Independent  Med Delivery   whole    Wound Care Documentation and Therapy:        Elimination:  Continence:   · Bowel: Yes  · Bladder: Yes  Urinary Catheter: None   Colostomy/Ileostomy/Ileal Conduit: No       Date of Last BM: 2021    Intake/Output Summary (Last 24 hours) at 2021 1346  Last data filed at 2021 1028  Gross per 24 hour   Intake    Output 150 ml   Net -150 ml     No intake/output data recorded.     Safety Concerns: History of Falls (last 30 days) and At Risk for Falls    Impairments/Disabilities:      None    Nutrition Therapy:  Current Nutrition Therapy:   - Oral Diet:  General    Routes of Feeding: Oral  Liquids: Thin Liquids  Daily Fluid Restriction: no  Last Modified Barium Swallow with Video (Video Swallowing Test): not done    Treatments at the Time of Hospital Discharge:   Respiratory Treatments: None  Oxygen Therapy:  is not on home oxygen therapy. Ventilator:    - No ventilator support    Rehab Therapies: Physical Therapy and Occupational Therapy  Weight Bearing Status/Restrictions: No weight bearing restirctions  Other Medical Equipment (for information only, NOT a DME order):  walker  Other Treatments: None    Patient's personal belongings (please select all that are sent with patient):  None    RN SIGNATURE:  Electronically signed by Shakira Minor RN on 2/9/21 at 2:53 PM EST    CASE MANAGEMENT/SOCIAL WORK SECTION    Inpatient Status Date: ***    Readmission Risk Assessment Score:  Readmission Risk              Risk of Unplanned Readmission:        0           Discharging to Facility/ Agency   · Name:   · Address:  · Phone:  · Fax:    Dialysis Facility (if applicable)   · Name:  · Address:  · Dialysis Schedule:  · Phone:  · Fax:    / signature: {Esignature:[id number]}    PHYSICIAN SECTION    Prognosis: {Prognosis:5230940333}    Condition at Discharge: [address] Patient Condition:[id number]}    Rehab Potential (if transferring to Rehab): {Prognosis:4104236144}    Recommended Labs or Other Treatments After Discharge: ***    Physician Certification: I certify the above information and transfer of Monse [place]  is necessary for the continuing treatment of the diagnosis listed and that he requires {Admit to Appropriate Level of Care:78884} for {GREATER/LESS:683896738} 30 days.      Update Admission H&P: {[facility] DME Changes in [facility]:016061072} PHYSICIAN SIGNATURE:  Electronically signed by Chelsey Cedillo MD on 2/8/21 at 1:46 PM EST

## 2021-02-08 NOTE — DISCHARGE INSTR - DIET
? Good nutrition is important when healing from an illness, injury, or surgery. Follow any nutrition recommendations given to you during your hospital stay. ? If you were given an oral nutrition supplement while in the hospital, continue to take this supplement at home. You can take it with meals, in-between meals, and/or before bedtime. These supplements can be purchased at most local grocery stores, pharmacies, and chain [employer]-stores. ? If you have any questions about your diet or nutrition, call the hospital and ask for the dietitian.

## 2021-02-08 NOTE — ANESTHESIA PROCEDURE NOTES
Peripheral Block    Patient location during procedure: pre-op  Start time: 2/8/2021 10:08 AM  End time: 2/8/2021 10:19 AM  Staffing  Performed: anesthesiologist   Anesthesiologist: Randy Jacques MD  Preanesthetic Checklist  Completed: patient identified, IV checked, site marked, risks and benefits discussed, surgical consent, monitors and equipment checked, pre-op evaluation, timeout performed, anesthesia consent given, oxygen available and patient being monitored  Peripheral Block  Patient position: supine  Prep: ChloraPrep  Patient monitoring: cardiac monitor, continuous pulse ox, frequent blood pressure checks and IV access  Block type: Erector spinae (L2)  Laterality: bilateral  Injection technique: single-shot  Guidance: nerve stimulator and ultrasound guided  Local infiltration: bupivacaine  Infiltration strength: 0.25 %  Dose: 60 mL  Provider prep: mask and sterile gloves (Sterile probe cover)  Local infiltration: bupivacaine  Needle  Needle type: combined needle/nerve stimulator   Needle gauge: 22 G  Needle length: 5 cm  Needle localization: anatomical landmarks and ultrasound guidance  Assessment  Injection assessment: negative aspiration for heme, no paresthesia on injection and local visualized surrounding nerve on ultrasound  Paresthesia pain: immediately resolved  Slow fractionated injection: yes  Hemodynamics: stable  Additional Notes  Ultrasound image printed and saved in patient chart.     Sterile probe cover used    Medications Administered  Bupivacaine (MARCAINE) PF injection 0.25%, 60 mL  Reason for block: post-op pain management and at surgeon's request

## 2021-02-08 NOTE — OP NOTE
hypertrophied ligamentum flavum from the superior aspect of the L5  lamina on the right side. Microscope angled over the dorsal aspect of  the dural sac to the patient's left side removing the hypertrophied  ligamentum flavum from the inferior aspect of L4 from the medial facet  joint complex and superior aspect of L5 accomplishing a right and  bilateral L4-5 microdecompression. Microscope retractor moved to the  L3-4 level on the right side. Partial hemilaminectomy on inferior  aspect of L3 was performed, small medial facetectomy detaching the  ligamentum flavum from the superior aspect of L4 on the right. Microscope angled over the dorsal aspect of the dural sac to the  patient's left side, detaching ligamentum flavum from the inferior  aspect of L3, superior aspect of L4 and from the medial facet joint  complex accomplishing a right and bilateral L3-4 microdecompression. Microscope was then brought up to the L2-3 level with the microretractor  in place. Partial hemilaminectomy on the right side, the inferior  aspect of L2 was performed, working superiorly not laterally performing  a small medial facetectomy, detaching the ligamentum flavum from the  superior aspect of L3 on the right. Microscope angled over the dorsal  aspect of the dural sac to the patient's left side detaching the  ligamentum flavum from the inferior aspect of L2, superior aspect of L3  and from the medial facets accomplishing a right and bilateral L2-3,  L3-4 and L4-5 microdecompressions. Surgiflo was placed. The wound was  thoroughly irrigated. A wound drain was placed and brought out through  a separate stab incision. The wound was thoroughly irrigated several  times. The fascia was closed with 0 Vicryl suture. Subcutaneous  tissues were closed with 2-0, 3-0 Vicryl suture. EBL 50 mL. No blood  transfused. The patient tolerated the procedure well.         Leticia Mendoza MD D: 02/08/2021 13:47:18       T: 02/08/2021 13:52:24     [email or web address]  Job#: [id number]     Doc#: [id number]    CC:

## 2021-02-09 VITALS
DIASTOLIC BLOOD PRESSURE: 60 MMHG | BODY MASS INDEX: 38.04 KG/M2 | HEART RATE: 57 BPM | SYSTOLIC BLOOD PRESSURE: 117 MMHG | HEIGHT: 68 IN | TEMPERATURE: 98.1 F | WEIGHT: 251 LBS | RESPIRATION RATE: 18 BRPM | OXYGEN SATURATION: 94 %

## 2021-02-09 PROCEDURE — 97161 PT EVAL LOW COMPLEX 20 MIN: CPT

## 2021-02-09 PROCEDURE — 6370000000 HC RX 637 (ALT 250 FOR IP): Performed by: NEUROLOGICAL SURGERY

## 2021-02-09 PROCEDURE — 2580000003 HC RX 258: Performed by: NEUROLOGICAL SURGERY

## 2021-02-09 PROCEDURE — 6370000000 HC RX 637 (ALT 250 FOR IP): Performed by: NURSE PRACTITIONER

## 2021-02-09 PROCEDURE — 6360000002 HC RX W HCPCS: Performed by: NEUROLOGICAL SURGERY

## 2021-02-09 PROCEDURE — 97166 OT EVAL MOD COMPLEX 45 MIN: CPT

## 2021-02-09 RX ADMIN — POLYETHYLENE GLYCOL 3350 17 G: 17 POWDER, FOR SOLUTION ORAL at 08:56

## 2021-02-09 RX ADMIN — TAMSULOSIN HYDROCHLORIDE 0.4 MG: 0.4 CAPSULE ORAL at 08:55

## 2021-02-09 RX ADMIN — CEFAZOLIN SODIUM 2000 MG: 2 SOLUTION INTRAVENOUS at 02:17

## 2021-02-09 RX ADMIN — DOCUSATE SODIUM 50 MG AND SENNOSIDES 8.6 MG 1 TABLET: 8.6; 5 TABLET, FILM COATED ORAL at 08:55

## 2021-02-09 RX ADMIN — TRAMADOL HYDROCHLORIDE 50 MG: 50 TABLET, FILM COATED ORAL at 15:22

## 2021-02-09 RX ADMIN — FINASTERIDE 5 MG: 5 TABLET, FILM COATED ORAL at 08:55

## 2021-02-09 RX ADMIN — LOSARTAN POTASSIUM 50 MG: 50 TABLET, FILM COATED ORAL at 08:55

## 2021-02-09 RX ADMIN — SODIUM CHLORIDE: 9 INJECTION, SOLUTION INTRAVENOUS at 05:01

## 2021-02-09 RX ADMIN — ACETAMINOPHEN 650 MG: 325 TABLET ORAL at 06:33

## 2021-02-09 RX ADMIN — BISACODYL 5 MG: 5 TABLET, COATED ORAL at 08:55

## 2021-02-09 ASSESSMENT — PAIN SCALES - GENERAL
PAINLEVEL_OUTOF10: 0
PAINLEVEL_OUTOF10: 0
PAINLEVEL_OUTOF10: 5
PAINLEVEL_OUTOF10: 0
PAINLEVEL_OUTOF10: 3

## 2021-02-09 NOTE — PROGRESS NOTES
Physical Therapy Med Surg Initial Assessment  Facility/Department: [facility]  Room: N226/N226-       NAME: Linda Clemons  : 1935 (82 y.o.)  MRN: [id number]  CODE STATUS: Full Code    Date of Service: 2021    Patient Diagnosis(es): Lumbar stenosis with neurogenic claudication [M48.062]   No chief complaint on file.     Patient Active Problem List    Diagnosis Date Noted    Lumbar stenosis with neurogenic claudication 2021    Atherosclerosis of native artery of both lower extremities with intermittent claudication ([address] 2021    Atrial fibrillation ([address] 2021    Venous insufficiency 2021    Sleep apnea 2021    Excessive daytime sleepiness 2021    Spinal stenosis of lumbar region with neurogenic claudication 2021    Transient ischemic attack 2019    Polycythemia 2018    Intermittent claudication ([facility] 12/10/2018    Rosacea 2018    Fuchs' corneal dystrophy 2016    Ventral hernia, recurrent 2013    Ventral hernia 2012    Hx of blood clots     Former smoker     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    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        Past Medical History:   Diagnosis Date    A-fib ([address]     approx 1 year ago-cardioverted    Arthritis     Baker's cyst of knee, left     Cerebral artery occlusion with cerebral infarction ([address]     TIA sx felt funny --     Coronary artery disease     HTN (hypertension)     meds > 20 yrs    Hx of blood clots     DVT left leg     Hyperlipidemia     meds > 20 yrs    Polycythemia     Torn meniscus     left knee     Past Surgical History:   Procedure Laterality Date    COLONOSCOPY      COLONOSCOPY      CORONARY ANGIOPLASTY WITH STENT PLACEMENT  10/2006    x 1 cardiac stent    ENDOSCOPY, COLON, DIAGNOSTIC      EYE SURGERY      [place] with IOL OU    HERNIA REPAIR  2013 redo ca mesh in [id number].3263 -- umbilical hernia    JOINT REPLACEMENT Bilateral 2009 & 2011    Bilateral TKR    KNEE SURGERY Left      arthroscopic surgery to repair meniscus of left knee    LAMINECTOMY N/A 2/8/2021    RIGHT BILATERAL L2-3 3-4 4-5 MICRODECOMPRESSION performed by Austin Alcantara MD at [address]  age 8   [address]  03/2012       Chart Reviewed: Yes  Patient assessed for rehabilitation services?: Yes  Family / Caregiver Present: No  General Comment  Comments: PT/OT co-eval    Restrictions:  Restrictions/Precautions: Fall Risk(High per mclaughlin)  Position Activity Restriction  Spinal Precautions: Limit bending/lifting/twisting per clinical judgement     SUBJECTIVE:      Pain  Pre Treatment Pain Screening  Pain at present: 0  Intervention List: Patient able to continue with treatment    Post Treatment Pain Screening:   Pain Assessment  Pain Level: 0    Prior Level of Function:  Social/Functional History  Lives With: Spouse  Type of Home: House  Home Layout: Two level, Performs ADL's on one level  Home Access: Stairs to enter without rails  Entrance Stairs - Number of Steps: 3- pt reports he puts his hand on a freezer going up the steps  Bathroom Shower/Tub: Walk-in shower, Shower chair with back  Bathroom Equipment: Shower chair  Home Equipment: Rolling walker  ADL Assistance: Independent  Homemaking Assistance: Independent  Ambulation Assistance: Independent(No AD)  Transfer Assistance: Independent  Active :  Yes  Additional Comments: Pt reports his daughter lives with them and is home all the time, could assist.    OBJECTIVE:   Vision: Impaired  Vision Exceptions: Wears glasses at all times  Hearing: Within functional limits    Cognition:  Overall Orientation Status: Within Functional Limits  Follows Commands: Within Functional Limits    Observation/Palpation  Posture: Good  Observation: Pt alert and attentive, pleasant    ROM:  RLE AROM: WFL  LLE AROM : WFL    Strength: Strength RLE  Comment: grossly 4+/5  Strength LLE  Comment: grossly 4+/5    Neuro:  Balance  Posture: Good  Sitting - Static: Good  Sitting - Dynamic: Good  Standing - Static: Fair;-  Standing - Dynamic: Fair;-(requires B UE support to maintain steadiness)     Tone RLE  RLE Tone: Normotonic  Tone LLE  LLE Tone: Normotonic  Motor Control  Gross Motor?: WFL  Sensation  Overall Sensation Status: WFL    Bed mobility  Comment: NT due to pt in bathroom upon arrival and sitting in bedside chair after amb- PT educated pt verbally in log roll technique- pt stated understanding    Transfers  Sit to Stand: Stand by assistance  Stand to sit: Stand by assistance  Comment: verbal cues for safe hand placement; posterior lean exhibited; [facility]    Ambulation  Ambulation?: Yes  Ambulation 1  Surface: level tile  Device: Rolling Walker  Assistance: Stand by assistance  Gait Deviations: Slow Lety; Increased MENA; Decreased step length;Decreased step height  Distance: 30 ft- decreased amb distance only due to pt fatigue after completing washing up in the bathroom prior to trialing gait training    Activity Tolerance  Activity Tolerance: Patient limited by fatigue       PT Education  PT Education: Goals;PT Role;Transfer Training;Plan of Care;General Safety;Gait Training;Precautions    ASSESSMENT:   Body structures, Functions, Activity limitations: Decreased functional mobility ; Decreased balance;Decreased ROM; Decreased endurance; Increased pain;Decreased strength  Decision Making: Low Complexity  History: high  Exam: med  Clinical Presentation: low    Prognosis: Good    DISCHARGE RECOMMENDATIONS:  Discharge Recommendations: Continue to assess pending progress, Patient would benefit from continued therapy after discharge Assessment: Pt exhibits decreased speed of amb, decreased activity tolerance for functional mobility, decreased balance, decreased ability to apply back precautions to functional mobility, and increased assistance compared to PLOF of indep without AD. Continued PT is indicated. Plan to train with pt demonstrating in log roll technique for bed mobility and stair negotiation for safe d/c home. REQUIRES PT FOLLOW UP: Yes      PLAN OF CARE:  Plan  Times per week: 5-7  Times per day: (1-2 visits per day)  Current Treatment Recommendations: Strengthening, Transfer Training, Endurance Training, Neuromuscular Re-education, Patient/Caregiver Education & Training, ROM, Wheelchair Mobility Training, Manual Therapy - Soft Tissue Mobilization, Pain Management, Equipment Evaluation, Education, & procurement, Balance Training, Gait Training, Home Exercise Program, Functional Mobility Training, Stair training, Safety Education & Training, Positioning  Safety Devices  Type of devices: Call light within reach, Chair alarm in place, Left in chair    Goals:  Long term goals  Long term goal 1: Pt will demonstrate bed mobility using log roll technique 100% of the time with indep for safe d/c home and following back precautions. Long term goal 2: Pt will demonstrate transers mod indep with [employer] for safe d/c home. Long term goal 3: Pt will demonstrate amb mod indep with [employer] 150ft to allow pt to amb inside his home with safety. Long term goal 4: Pt will demosntrate stair negotiation 3 steps without rails SBA to allow pt to enter and exit his home safely. Long term goal 5: Pt will demonstrate HEP with indep.     [facility] (6 CLICK) BASIC MOBILITY  AM-PAC Inpatient Mobility Raw Score : 18    Therapy Time:   Individual   Time In 1006   Time Out 1020   Minutes Roderick Cortés PT, 02/09/21 at 11:27 AM         Definitions for assistance levels

## 2021-02-09 NOTE — PROGRESS NOTES
[facility] OCCUPATIONAL THERAPY EVALUATION - ACUTE     NAME: Win Youngblood  : 1935 (82 y.o.)  MRN: [id number]  CODE STATUS: Full Code  Room: \[facility] [place] [facility]\""C426-64    Date of Service: 2021    Patient Diagnosis(es): Lumbar stenosis with neurogenic claudication [M48.062]   No chief complaint on file.     Patient Active Problem List    Diagnosis Date Noted    Lumbar stenosis with neurogenic claudication 2021    Atherosclerosis of native artery of both lower extremities with intermittent claudication ([address] 2021    Atrial fibrillation ([address] 2021    Venous insufficiency 2021    Sleep apnea 2021    Excessive daytime sleepiness 2021    Spinal stenosis of lumbar region with neurogenic claudication 2021    Transient ischemic attack 2019    Polycythemia 2018    Intermittent claudication ([address] 12/10/2018    Rosacea 2018    Fuchs' corneal dystrophy 2016    Ventral hernia, recurrent 2013    Ventral hernia 2012    Hx of blood clots     Former smoker     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    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        Past Medical History:   Diagnosis Date    A-fib ([address]     approx 1 year ago-cardioverted    Arthritis     Baker's cyst of knee, left     Cerebral artery occlusion with cerebral infarction ([address]     TIA sx felt funny --     Coronary artery disease     HTN (hypertension)     meds > 20 yrs    Hx of blood clots     DVT left leg     Hyperlipidemia     meds > 20 yrs    Polycythemia     Torn meniscus     left knee     Past Surgical History:   Procedure Laterality Date    COLONOSCOPY      COLONOSCOPY      CORONARY ANGIOPLASTY WITH STENT PLACEMENT  10/2006    x 1 cardiac stent    ENDOSCOPY, COLON, DIAGNOSTIC      EYE SURGERY      [place] with IOL OU    HERNIA REPAIR  2013    redo ca mesh in [id number] -- umbilical hernia  JOINT REPLACEMENT Bilateral 2009 & 2011    Bilateral TKR    KNEE SURGERY Left      arthroscopic surgery to repair meniscus of left knee    LAMINECTOMY N/A 2/8/2021    RIGHT BILATERAL L2-3 3-4 4-5 MICRODECOMPRESSION performed by Nahum Kumar MD at [address]  age 8   [address]  03/2012        Restrictions  Restrictions/Precautions: Fall Risk(High per mclaughlin)  Position Activity Restriction  Spinal Precautions: Limit bending/lifting/twisting per clinical judgement     Safety Devices: Safety Devices  Safety Devices in place: Yes  Type of devices: All fall risk precautions in place        Subjective  Pre Treatment Pain Screening  Pain at present: 0  Scale Used: Numeric Score  Intervention List: Patient able to continue with treatment, Patient declined any intervention    Pain Reassessment:   Pain Assessment  Patient Currently in Pain: Denies  Pain Assessment: 0-10  Pain Level: 0       Prior Level of Function:  Social/Functional History  Lives With: Spouse  Type of Home: House  Home Layout: Two level, Performs ADL's on one level  Home Access: Stairs to enter without rails  Entrance Stairs - Number of Steps: 3- pt reports he puts his hand on a freezer going up the steps  Bathroom Shower/Tub: Walk-in shower, Shower chair with back  Bathroom Equipment: Shower chair  Home Equipment: Rolling walker  ADL Assistance: Independent  Homemaking Assistance: Independent  Ambulation Assistance: Independent(No AD)  Transfer Assistance: Independent  Active :  Yes  Additional Comments: Pt reports his daughter lives with them and is home all the time, could assist.    OBJECTIVE:     Orientation Status:  Orientation  Overall Orientation Status: Within Functional Limits    Observation:  Observation/Palpation  Posture: Good  Observation: Pt alert and attentive, pleasant    Cognition Status:  Cognition  Overall Cognitive Status: WFL    Perception Status:  Perception  Overall Perceptual Status: [facility] Functional Mobility:  Functional Mobility  Functional - Mobility Device: Rolling Walker  Activity: To/from bathroom  Assist Level: Supervision  Functional Mobility Comments: G use of WW no LOB  Transfers  Sit to stand: Stand by assistance  Stand to sit: Stand by assistance  Transfer Comments: Increased effort and difficulty    Bed Mobility  Bed mobility  Comment: DNT bed mobility; pt. educated verbally on log roll technique. Pt. demonstrates G understanding verbally    Seated and Standing Balance:  Balance  Sitting Balance: Supervision  Standing Balance: Stand by assistance    Functional Endurance:  Activity Tolerance  Activity Tolerance: Patient Tolerated treatment well    D/C Recommendations:  OT D/C RECOMMENDATIONS  REQUIRES OT FOLLOW UP: Yes    Equipment Recommendations:  OT Equipment Recommendations  Other: Continue to assess    OT Education:   OT Education  OT Education: OT Role, Plan of Care  Patient Education: Educated pt. on role of acute care OT  Barriers to Learning: None    OT Follow Up:  OT D/C RECOMMENDATIONS  REQUIRES OT FOLLOW UP: Yes       Assessment/Discharge Disposition:  Assessment: Pt is an 82year old man from home with spouse who presents to [facility] for scheduled back pain. Pt. demonstrates the above deficits which impact his ability to perform ADLs independently. Pt. would benefit from continued OT to maximize independence and safety with ADL tasks.   Performance deficits / Impairments: Decreased ADL status, Decreased balance, Decreased endurance, Decreased high-level IADLs, Decreased functional mobility   Prognosis: Good  Discharge Recommendations: Continue to assess pending progress  Decision Making: Medium Complexity  History: Pt's medical history is moderately complex  Exam: Pt has 5 performance deficits  Assistance / Modification: Pt. requires min A    Six Click Score   How much help for putting on and taking off regular lower body clothing?: A Little  How much help for Bathing?: A Little How much help for Toileting?: A Little  How much help for putting on and taking off regular upper body clothing?: A Little  How much help for taking care of personal grooming?: A Little  How much help for eating meals?: None  AM-PAC Inpatient Daily Activity Raw Score: 19  AM-PAC Inpatient ADL T-Scale Score : 40.22  ADL Inpatient CMS 0-100% Score: 42.8    Plan:  Plan  Times per week: 1-3x  Plan weeks: Length of acute stay  Current Treatment Recommendations: Balance Training, Functional Mobility Training, Endurance Training, Safety Education & Training, Pain Management, Patient/Caregiver Education & Training, Self-Care / ADL, Equipment Evaluation, Education, & procurement, Home Management Training    Goals:   Patient will:    - Improve functional endurance to tolerate/complete 30 mins of ADL's  - Be Mod I in UB ADLs   - Be Mod I in LB ADLs  - Be Mod I in ADL transfers without LOB  - Be Mod I in toileting tasks  - Access appropriate D/C site with as few architectural barriers as possible. - Sequence self-care tasks with no verbal cues for safety    Patient Goal: Patient goals : \"I want to get home\"     Discussed and agreed upon: Yes Comments:     Therapy Time:   OT Individual Minutes  Time In: 1006  Time Out: 1020  Minutes: 14    Eval: 14 minutes     Electronically signed by:     NAIN Tenorio  2/9/2021, 10:38 AM

## 2021-02-09 NOTE — PROGRESS NOTES
Department of Internal Medicine  General Internal Medicine  Attending Progress Note      SUBJECTIVE:  Pt seen and examined. No complaints. States plan is discharge later today    OBJECTIVE      Medications    Current Facility-Administered Medications: 0.9 % sodium chloride infusion, , Intravenous, Continuous  sodium chloride flush 0.9 % injection 10 mL, 10 mL, Intravenous, 2 times per day  sodium chloride flush 0.9 % injection 10 mL, 10 mL, Intravenous, PRN  HYDROmorphone (DILAUDID) injection 0.25 mg, 0.25 mg, Intravenous, Q3H PRN **OR** HYDROmorphone (DILAUDID) injection 0.5 mg, 0.5 mg, Intravenous, Q3H PRN  acetaminophen (TYLENOL) tablet 650 mg, 650 mg, Oral, Q4H PRN  traMADol (ULTRAM) tablet 50 mg, 50 mg, Oral, Q6H PRN  polyethylene glycol (GLYCOLAX) packet 17 g, 17 g, Oral, Daily  bisacodyl (DULCOLAX) EC tablet 5 mg, 5 mg, Oral, Daily  sennosides-docusate sodium (SENOKOT-S) 8.6-50 MG tablet 1 tablet, 1 tablet, Oral, BID  ondansetron (ZOFRAN-ODT) disintegrating tablet 4 mg, 4 mg, Oral, Q8H PRN **OR** ondansetron (ZOFRAN) injection 4 mg, 4 mg, Intravenous, Q6H PRN  losartan (COZAAR) tablet 50 mg, 50 mg, Oral, Daily  furosemide (LASIX) tablet 20 mg, 20 mg, Oral, Daily  finasteride (PROSCAR) tablet 5 mg, 5 mg, Oral, Daily  tamsulosin (FLOMAX) capsule 0.4 mg, 0.4 mg, Oral, Daily  pravastatin (PRAVACHOL) tablet 20 mg, 20 mg, Oral, Nightly  aspirin EC tablet 81 mg, 81 mg, Oral, Daily  Facility-Administered Medications Ordered in Other Encounters: sodium chloride flush 0.9 % injection 10 mL, 10 mL, Intravenous, PRN  Physical    VITALS:  /60   Pulse 57   Temp 98.1 °F (36.7 °C) (Oral)   Resp 18   Ht 5' 7.5\" (1.715 m)   Wt 251 lb (113.9 kg)   SpO2 94%   BMI 38.73 kg/m²   Constitutional: Awake and alert in no acute distress.  Sitting in chair comfortably  Head: Normocephalic, atraumatic  Eyes: EOMI, PERRLA  ENT: moist mucous membranes  Neck: neck supple, trachea midline Lungs: Good inspiratory effort, CTABL, no wheeze, no rhonchi, no rales  Heart: RRR, normal S1 and S2  GI: Soft, non-distended, non tender, no guarding, no rebound, +BS  MSK: no edema noted  Skin: warm, dry  Psych: appropriate affect       ASSESSMENT AND PLAN      1. status post Right and bilateral L2-3, L3-4, L4-5 microdissection and decompression: Management per neurosurgery  2. A fib: rate controlled. Goal K and Mg 4.0 and 2.0, respectively. Patient to resume Coumadin 3 days postop. ASA to be resumed tomorrow. 3.  Hyperlipidemia: Continue pravastatin  4. Hypertension: Continue Cozaar and Lasix  5. BPH: Continue Flomax and Proscar monitor for signs of urinary retention, I&Os    Disposition: Pt medically stable.  Discharge per primary service      Randa Lara DO  Internal Medicine

## 2021-02-09 NOTE — FLOWSHEET NOTE
9572- Shift assessment completed at this time, pt A&Ox4, denies chest pain/SOB, denies nausea/vomiting, denies blurry vision/double vision, strength equal in bilateral upper and lower extremities, denies numbness/tingling to bilateral upper and lower extremities, denies difficulty with urinating, pt denies difficulty with bowel movements, VSS, pt denies further needs at this time-KGW  1300- Pt sitting up in chair, no needs at this time-KGW  1530- Dressing change done to lower back, wife instructed on how to change dressing, discharge instructions gone over with patient and his wife, no questions at this time-KGW  Electronically signed by Dilshad Mishra RN on 2/9/2021

## 2021-02-09 NOTE — PROGRESS NOTES
Patient has pain under control this morning. He is starting to mobilize out of bed. His back wound looks good with removal of the drain. He has good strength sensation lower extremities. Appreciate evaluation by Dr. Matthews hospitalist.  Once patient is ambulatory with his walker with bladder control will discharge to home. Will evaluate for possible home health.

## 2021-02-09 NOTE — CARE COORDINATION
[facility] Case Management Initial Discharge Assessment    Met with Patient to discuss discharge plan. PCP: Livia Wilkins MD        Date of Last Visit:     If no PCP, list provided? N/A    Discharge Planning    Living Arrangements: independently at home    Who do you live with? SPOUSE AND DTR    Who helps you with your care:  self    If lives at home:     Do you have any barriers navigating in your home? no    Patient can perform ADL? Yes    Current Services (outpatient and in home) :  None    Dialysis: No    Is transportation available to get to your appointments? Yes    DME Equipment:  no    Respiratory equipment: CPAP without O2    Respiratory provider:  no     Pharmacy:  yes - [facility] with Medication Assistance Program?  No      Patient agreeable to [place] [id number]? Declined    Patient agreeable to SNF/Rehab? N/A    Other discharge needs identified? N/A    Freedom of choice list provided with basic dialogue that supports the patient's individualized plan of care/goals and shares the quality data associated with the providers. N/A    The plan for Transition of Care is related to the following treatment goals: PAIN CONTROL    Initial Discharge Plan? (Note: please see concurrent daily documentation for any updates after initial note). HOME, DECLINED NEED FOR HHC. PLAN IS OUTPT PT AFTER 2 WEEK FOLLOW UP.  PATIENT STATES WIFE WILL DO DRESSINGS AND DTR WILL HELP IF NEEDED    The Patient and/or patient representative: PATIENT was provided with choice of any post-acute providers for care and equipment and agrees with discharge plan  Yes    Electronically signed by Yolie Sherman RN on 2/9/2021 at 10:25 AM

## 2022-03-04 NOTE — PROGRESS NOTES
Patient Name: Jannette Pope : 1935        Date: 3/8/2022      Type of Appt: Consult    Reason for appt: NEW MRI  LUMBAR, lumbar stenosis, referral from Dr. Valeria Menchaca office. Last seen by Dr. Hellen Farris 3-9-21    Studies done: 21 L/S MRI @ [facility]    Surgeries: 21 Right and bilateral L2-3, L3-4, L4-5  microdissection and decompression by Dr. Hellen Farris @ [facility]      Smoking: No, former quit 4 years ago    REVIEW OF SYSTEMS:    Difficulty Urinating, Bruising/Bleeding Easily, Hearing loss, Sleep Disturbance, Back Pain   Review of systems is otherwise negative                        [facility]) Physicians  Neurosurgery and Pain [address], [facility] [address], [address] [facility] 82: [phone]  F: [phone]          Patient:  Jannette Pope  YOB: 1935  Date: 3/8/2022    The patient is a 82 y.o. male who presents today for evaluation of the following problems:     Chief Complaint   Patient presents with    Back Pain        Referred by No ref. provider found               Estimated body mass index is 38.01 kg/m² as calculated from the following:    Height as of this encounter: 5' 8\" (1.727 m). Weight as of this encounter: 250 lb (113.4 kg).     PAST MEDICAL, FAMILY AND SOCIAL HISTORY:  Past Medical History:   Diagnosis Date    A-fib ([address]     approx 1 year ago-cardioverted    Arthritis     Baker's cyst of knee, left     Cerebral artery occlusion with cerebral infarction ([address]     TIA sx felt funny --     Coronary artery disease     HTN (hypertension)     meds > 20 yrs    Hx of blood clots     DVT left leg     Hyperlipidemia     meds > 20 yrs    Polycythemia     Torn meniscus     left knee     Past Surgical History:   Procedure Laterality Date    COLONOSCOPY      COLONOSCOPY      CORONARY ANGIOPLASTY WITH STENT PLACEMENT  10/2006    x 1 cardiac stent    ENDOSCOPY, COLON, DIAGNOSTIC      EYE SURGERY      [place] with IOL OU    HERNIA REPAIR 2013    redo ca mesh in [id number] -- umbilical hernia    JOINT REPLACEMENT Bilateral  &     Bilateral TKR    KNEE SURGERY Left      arthroscopic surgery to repair meniscus of left knee    LAMINECTOMY N/A 2021    RIGHT BILATERAL L2-3 3-4 4-5 MICRODECOMPRESSION performed by Patti Schultz MD at [address]  age 8   [address]  2012     Family History   Problem Relation Age of Onset    Heart Attack Mother     Other Mother          in [facility] Other    [place] Cancer Daughter         colon & lung cancer    Colon Cancer Daughter     Breast Cancer Other     Other Father          at age 80    Other Sister          as infant   [place] Other Brother         unknown medical hx    No Known Problems Son     Other Brother          at age 61 due to accident     Current Outpatient Medications on File Prior to Visit   Medication Sig Dispense Refill    rosuvastatin (CRESTOR) 10 MG tablet TAKE 1 TABLET BY MOUTH EVERY DAY      vitamin D (ERGOCALCIFEROL) 1.25 MG (09318 UT) CAPS capsule TAKE ONE CAPSULE WEEKLY      dutasteride (AVODART) 0.5 MG capsule Take by mouth      furosemide (LASIX) 20 MG tablet Take by mouth      amLODIPine (NORVASC) 5 MG tablet TAKE 1 TABLET BY MOUTH EVERY DAY      warfarin (COUMADIN) 2.5 MG tablet Take 4 mg by mouth daily Indications: -Th-Sat - Sun       nitroGLYCERIN (NITROSTAT) 0.4 MG SL tablet Place 0.4 mg under the tongue every 5 minutes as needed for Chest pain up to max of 3 total doses. If no relief after 1 dose, call 911.       tiZANidine (ZANAFLEX) 4 MG tablet Take 4 mg by mouth nightly      Omega-3 Fatty Acids (FISH OIL) 1200 MG CAPS Take by mouth daily      losartan (COZAAR) 50 MG tablet Take 50 mg by mouth daily      dutasteride (AVODART) 0.5 MG capsule Take by mouth daily      CPAP Machine MISC by Does not apply route      predniSONE (DELTASONE) 20 MG tablet Take 20 mg by mouth daily Indications: 2 tabs daily       tamsulosin (FLOMAX) 0.4 MG capsule Take 0.4 mg by mouth daily       warfarin (COUMADIN) 5 MG tablet Take 5 mg by mouth Twice a Week Indications: Znhkpj-Gtyotlonv-Ilxafo       furosemide (LASIX) 20 MG tablet Take 20 mg by mouth daily       aspirin 81 MG EC tablet Take 81 mg by mouth daily.  pravastatin (PRAVACHOL) 20 MG tablet every evening (Patient not taking: Reported on 3/8/2022)      Cholecalciferol (VITAMIN D3) 125 MCG (5000 UT) TABS Take by mouth daily (Patient not taking: Reported on 3/8/2022)       Current Facility-Administered Medications on File Prior to Visit   Medication Dose Route Frequency Provider Last Rate Last Admin    sodium chloride flush 0.9 % injection 10 mL  10 mL IntraVENous PRN Scott Stanley MD   10 mL at 07/10/19 1220     Social History     Tobacco Use    Smoking status: Former Smoker     Packs/day: 0.50     Years: 10.00     Pack years: 5.00     Types: Cigarettes     Quit date: 1968     Years since quittin.9    Smokeless tobacco: Never Used   Vaping Use    Vaping Use: Never used   Substance Use Topics    Alcohol use: Yes     Comment: social    Drug use: Never       ALLERGIES  Allergies   Allergen Reactions    Benadryl [Diphenhydramine Hcl] Anxiety           I have personally reviewed the PMH, PSH, family history, home medications, social history, allergies, ROS. Physical Exam:      Vitals:    22 1436   BP: 116/62   Site: Left Upper Arm   Temp: 97.5 °F (36.4 °C)   TempSrc: Temporal   Weight: 250 lb (113.4 kg)   Height: 5' 8\" (1.727 m)       Physical Exam:  Vitals and notes reviewed. Constitutional:  See above height, weight, pulse rate, and blood pressure. BMI 38     Appearance: Normal appearance. Head:      Normocephalic and atraumatic. Ears, Nose, Mouth, and Throat:      Normal shape, no discharge, deglutition intact  Eyes:      Extraocular Movements: Extraocular movements intact.       Pupils: Pupils are equal, round, and reactive to light. Cardiovascular:      Pulses: Normal radialis pulses. Heart sounds: Normal heart sounds, regular rate, no rubs or murmurs. Respiratory:      lungs clear to auscultation  Abdomen:        Soft to palpation. Bowel sounds present. Genitourinary:      Normal for sex  Musculoskeletal: Swollen pitting edema both lower extremities. Walks flat-footed very short steps using his cane mainly in the right hand. Decreased sensation both lower extremities secondary swelling. With assistance he can go up on his toes and his heels but cannot step. Gait: Regular walk and toe walk and heel walk intact. General: Normal range of motion. Extremities well developed and symmetric. Muscle tone normal with no spasticity or fasciculations. Skin:     General: Skin is warm and dry. Capillary Refill: Capillary refill less than 2 seconds. Neurological:      Mental Status: Alert and oriented to person, place, and time. Cranial nerves individually tested 2 through 12 normal  Hematologic/Lymphatic/Immunologic:      Negative for lymphadenopathy, hematomas. Psychiatric:         Mood and Affect: Mood normal. Appropriate affect    I have reviewed all laboratory studies, reports, data, and pertinent images. 11/9/2021 x-ray lumbar spine noms      12/7/2021 MRI lumbar spine pdi noms        HISTORY OF PRESENT ILLNESS:    Patient had excellent relief from his back pain and increased better ability to walk after his above surgery 2/8/2021 right bilateral L2-3-4 5 decompression. He has had some gait deterioration his last several months going to a cane about 4 to 6 months ago. He cannot stand for very long because his legs just want to give out on him. His skin feels too tight on his lower extremities. He is having trouble walking and his balance is worsening. He can stand maybe 10 minutes before he has to sit down. Circulation has been tested and it is okay. Dr. Andie Swanson has been caring for him.   He saw another doctor Melania PAZ who is checking his heart out with an EKG schedule for an echocardiogram because of his swollen legs. He is now on a water pill 3 times a day has been diagnosed with congestive heart failure. At night when he lays on his right side he gets a pain in his right buttocks and has to turn over to relieve. He does not have much back or lower extremity pain. Discussed with the patient his MRI study looks satisfactory postoperatively with no significant canal stenosis. Some foraminal stenosis but that is not the basis for his balance problems and swelling in the lower extremities. He is not a candidate for consideration of neuro spine surgery. Advised a course of physical therapy to try and increase his strength and improve his balance.     PLan physical therapy      Dale Padilla MD

## 2022-03-08 ENCOUNTER — INITIAL CONSULT (OUTPATIENT)
Dept: NEUROSURGERY | Age: 87
End: 2022-03-08
Payer: MEDICARE

## 2022-03-08 VITALS
WEIGHT: 250 LBS | BODY MASS INDEX: 37.89 KG/M2 | DIASTOLIC BLOOD PRESSURE: 62 MMHG | SYSTOLIC BLOOD PRESSURE: 116 MMHG | HEIGHT: 68 IN | TEMPERATURE: 97.5 F

## 2022-03-08 DIAGNOSIS — I50.9 CONGESTIVE HEART FAILURE, UNSPECIFIED HF CHRONICITY, UNSPECIFIED HEART FAILURE TYPE (HCC): ICD-10-CM

## 2022-03-08 DIAGNOSIS — M47.816 LUMBAR SPONDYLOSIS: Primary | ICD-10-CM

## 2022-03-08 DIAGNOSIS — R26.89 BALANCE DISORDER: ICD-10-CM

## 2022-03-08 PROCEDURE — 1123F ACP DISCUSS/DSCN MKR DOCD: CPT | Performed by: NEUROLOGICAL SURGERY

## 2022-03-08 PROCEDURE — G8484 FLU IMMUNIZE NO ADMIN: HCPCS | Performed by: NEUROLOGICAL SURGERY

## 2022-03-08 PROCEDURE — 99214 OFFICE O/P EST MOD 30 MIN: CPT | Performed by: NEUROLOGICAL SURGERY

## 2022-03-08 PROCEDURE — G8417 CALC BMI ABV UP PARAM F/U: HCPCS | Performed by: NEUROLOGICAL SURGERY

## 2022-03-08 PROCEDURE — 4040F PNEUMOC VAC/ADMIN/RCVD: CPT | Performed by: NEUROLOGICAL SURGERY

## 2022-03-08 PROCEDURE — G8427 DOCREV CUR MEDS BY ELIG CLIN: HCPCS | Performed by: NEUROLOGICAL SURGERY

## 2022-03-08 PROCEDURE — 1036F TOBACCO NON-USER: CPT | Performed by: NEUROLOGICAL SURGERY

## 2022-03-08 RX ORDER — DUTASTERIDE 0.5 MG/1
CAPSULE, LIQUID FILLED ORAL
COMMUNITY

## 2022-03-08 RX ORDER — FUROSEMIDE 20 MG/1
TABLET ORAL
COMMUNITY

## 2022-03-08 RX ORDER — AMLODIPINE BESYLATE 5 MG/1
TABLET ORAL
COMMUNITY
Start: 2021-12-22 | End: 2022-07-25

## 2022-03-08 RX ORDER — ERGOCALCIFEROL 1.25 MG/1
CAPSULE ORAL
COMMUNITY
Start: 2021-12-22 | End: 2022-07-25

## 2022-03-08 RX ORDER — ROSUVASTATIN CALCIUM 10 MG/1
TABLET, COATED ORAL
COMMUNITY
Start: 2022-02-24

## 2022-07-25 ENCOUNTER — INITIAL CONSULT (OUTPATIENT)
Dept: PAIN MANAGEMENT | Age: 87
End: 2022-07-25
Payer: MEDICARE

## 2022-07-25 VITALS
BODY MASS INDEX: 38.65 KG/M2 | OXYGEN SATURATION: 96 % | SYSTOLIC BLOOD PRESSURE: 132 MMHG | HEIGHT: 68 IN | HEART RATE: 88 BPM | TEMPERATURE: 97.8 F | DIASTOLIC BLOOD PRESSURE: 88 MMHG | WEIGHT: 255 LBS

## 2022-07-25 DIAGNOSIS — D75.1 POLYCYTHEMIA: ICD-10-CM

## 2022-07-25 DIAGNOSIS — M96.1 LUMBAR POST-LAMINECTOMY SYNDROME: Primary | ICD-10-CM

## 2022-07-25 PROCEDURE — 1036F TOBACCO NON-USER: CPT | Performed by: PAIN MEDICINE

## 2022-07-25 PROCEDURE — G8427 DOCREV CUR MEDS BY ELIG CLIN: HCPCS | Performed by: PAIN MEDICINE

## 2022-07-25 PROCEDURE — 1123F ACP DISCUSS/DSCN MKR DOCD: CPT | Performed by: PAIN MEDICINE

## 2022-07-25 PROCEDURE — 99203 OFFICE O/P NEW LOW 30 MIN: CPT | Performed by: PAIN MEDICINE

## 2022-07-25 PROCEDURE — G8417 CALC BMI ABV UP PARAM F/U: HCPCS | Performed by: PAIN MEDICINE

## 2022-07-25 RX ORDER — PREGABALIN 50 MG/1
50 CAPSULE ORAL 2 TIMES DAILY
Qty: 90 CAPSULE | Refills: 3 | Status: SHIPPED | OUTPATIENT
Start: 2022-07-25 | End: 2022-10-25

## 2022-07-25 RX ORDER — LOSARTAN POTASSIUM 100 MG/1
100 TABLET ORAL DAILY
COMMUNITY

## 2022-07-25 RX ORDER — MELOXICAM 15 MG/1
15 TABLET ORAL DAILY
COMMUNITY

## 2022-07-25 RX ORDER — GABAPENTIN 400 MG/1
400 CAPSULE ORAL NIGHTLY
COMMUNITY
End: 2022-07-25

## 2022-07-25 RX ORDER — POTASSIUM CHLORIDE 20 MEQ/1
20 TABLET, EXTENDED RELEASE ORAL 2 TIMES DAILY
COMMUNITY

## 2022-07-25 ASSESSMENT — ENCOUNTER SYMPTOMS
ALLERGIC/IMMUNOLOGIC NEGATIVE: 1
SHORTNESS OF BREATH: 1
EYES NEGATIVE: 1
GASTROINTESTINAL NEGATIVE: 1

## 2022-07-25 NOTE — PROGRESS NOTES
History of Present Illness     Patient Identification  Gumaro Donis is a 82 y.o. male. Patient information was obtained from patient. Chief Complaint   Chief Complaint   Patient presents with    Back Pain    Leg Pain     Bilateral        Patient presents with complaint of bilateral calf. This is a result of no known injury. Vascular studies WNL,Onset of pain was 1 year ago after a laminotomy and has been stable since. described as Throbbing and rated as moderate, to both feet. Symptoms include weakness both legs. . The patient denies incontinence, dysuria. The patient denies other injuries. Care prior to arrival consisted of Physical theraphy 1 year ago  with no relief. Past Medical History:   Diagnosis Date    A-fib ([facility] [address]     approx 1 year ago-cardioverted    Arthritis     Baker's cyst of knee, left     Cerebral artery occlusion with cerebral infarction ([facility] [address]     TIA sx felt funny --     Congestive heart failure ([facility] [address] 2022    Coronary artery disease     HTN (hypertension)     meds > 20 yrs    Hx of blood clots     DVT left leg     Hyperlipidemia     meds > 20 yrs    Pacemaker 2022    Polycythemia     Torn meniscus     left knee     Family History   Problem Relation Age of Onset    Heart Attack Mother     Other Mother          in MVA    Colon Cancer Other     Cancer Daughter         colon & lung cancer    Colon Cancer Daughter     Breast Cancer Other     Other Father          at age 80    Other Sister          as infant    Other Brother         unknown medical hx    No Known Problems Son     Other Brother          at age 61 due to accident     Current Outpatient Medications   Medication Sig Dispense Refill    losartan (COZAAR) 100 MG tablet Take 100 mg by mouth in the morning.      gabapentin (NEURONTIN) 400 MG capsule Take 400 mg by mouth nightly. meloxicam (MOBIC) 15 MG tablet Take 15 mg by mouth in the morning.       potassium chloride (KLOR-CON M) 20 MEQ extended release tablet Take 20 mEq by mouth in the morning and 20 mEq before bedtime. rosuvastatin (CRESTOR) 10 MG tablet TAKE 1 TABLET BY MOUTH EVERY DAY      nitroGLYCERIN (NITROSTAT) 0.4 MG SL tablet Place 0.4 mg under the tongue every 5 minutes as needed for Chest pain up to max of 3 total doses. If no relief after 1 dose, call 911. Omega-3 Fatty Acids (FISH OIL) 1200 MG CAPS Take by mouth daily      Cholecalciferol (VITAMIN D3) 125 MCG (5000 UT) TABS Take by mouth daily      furosemide (LASIX) 20 MG tablet Take 20 mg by mouth daily       aspirin 81 MG EC tablet Take 81 mg by mouth daily. dutasteride (AVODART) 0.5 MG capsule Take by mouth      furosemide (LASIX) 20 MG tablet Take by mouth      warfarin (COUMADIN) 2.5 MG tablet Take 4 mg by mouth daily Indications: T-Th-Sat - Sun       pravastatin (PRAVACHOL) 20 MG tablet every evening (Patient not taking: Reported on 3/8/2022)      dutasteride (AVODART) 0.5 MG capsule Take by mouth daily      CPAP Machine MISC by Does not apply route      warfarin (COUMADIN) 5 MG tablet Take 5 mg by mouth Twice a Week Indications: [id number]        No current facility-administered medications for this visit. Facility-Administered Medications Ordered in Other Visits   Medication Dose Route Frequency Provider Last Rate Last Admin    sodium chloride flush 0.9 % injection 10 mL  10 mL IntraVENous PRN Marisa Stroud MD   10 mL at 07/10/19 1220     Allergies   Allergen Reactions    Benadryl [Diphenhydramine Hcl] Anxiety       Review of Systems  Review of Systems   Constitutional: Negative. HENT: Negative. Eyes: Negative. Respiratory:  Positive for shortness of breath. Mesothelioma. Left Spontanious Pneumothorax, Rib Fx. Cardiovascular: Negative. Afib, 1 stent   Gastrointestinal: Negative. Endocrine: Negative. Genitourinary: Negative. Musculoskeletal: Negative. Skin: Negative.     Allergic/Immunologic: Negative. Neurological: Negative. Hematological: Negative. Psychiatric/Behavioral: Negative. All other systems reviewed and are negative. Physical Exam     /88   Pulse 88   Temp 97.8 °F (36.6 °C)   Ht 5' 8\" (1.727 m)   Wt 255 lb (115.7 kg)   SpO2 96%   BMI 38.77 kg/m²   Physical Exam  Vitals and nursing note reviewed. Constitutional:       Appearance: Normal appearance. HENT:      Head: Normocephalic. Right Ear: Ear canal normal.      Left Ear: Ear canal normal.      Nose: Nose normal.      Mouth/Throat:      Mouth: Mucous membranes are moist.   Eyes:      Extraocular Movements: Extraocular movements intact. Conjunctiva/sclera: Conjunctivae normal.      Pupils: Pupils are equal, round, and reactive to light. Cardiovascular:      Rate and Rhythm: Normal rate and regular rhythm. Pulses: Normal pulses. Heart sounds: Normal heart sounds. Pulmonary:      Effort: Pulmonary effort is normal.      Breath sounds: Normal breath sounds. Abdominal:      General: Abdomen is flat. Bowel sounds are normal.      Palpations: Abdomen is soft. Musculoskeletal:         General: Normal range of motion. Cervical back: Normal range of motion and neck supple. Comments: Good pulses bilateral, Surgical scar Seen, No lumbar tenderness, Weakness Plantar extension, Pain on SLRs both sides. Skin:     General: Skin is warm. Capillary Refill: Capillary refill takes less than 2 seconds. Neurological:      General: No focal deficit present. Mental Status: He is alert. Mental status is at baseline. He is disoriented. Psychiatric:         Mood and Affect: Mood normal.         Behavior: Behavior normal.         Thought Content: Thought content normal.         Judgment: Judgment normal.         Plan     Studies: MRI: L2-3,3-4,4-5 DDD and post laminotomies no new changes. Patient presents with complaint of bilateral calf. This is a result of no known injury.

## 2022-07-28 ENCOUNTER — OFFICE VISIT (OUTPATIENT)
Dept: PAIN MANAGEMENT | Age: 87
End: 2022-07-28
Payer: MEDICARE

## 2022-07-28 VITALS — WEIGHT: 260 LBS | BODY MASS INDEX: 39.4 KG/M2 | TEMPERATURE: 98.1 F | HEIGHT: 68 IN

## 2022-07-28 DIAGNOSIS — M79.604 BILATERAL LEG PAIN: Primary | ICD-10-CM

## 2022-07-28 DIAGNOSIS — M79.605 BILATERAL LEG PAIN: Primary | ICD-10-CM

## 2022-07-28 PROCEDURE — 95886 MUSC TEST DONE W/N TEST COMP: CPT | Performed by: PHYSICAL MEDICINE & REHABILITATION

## 2022-07-28 PROCEDURE — 95912 NRV CNDJ TEST 11-12 STUDIES: CPT | Performed by: PHYSICAL MEDICINE & REHABILITATION

## 2022-07-28 NOTE — PROGRESS NOTES
Electromyography (EMG)/Nerve conduction studies (NCS) Report: Lower Extremity    Name: Carson Irvin   YOB: 1935  Date of Service: 7/28/2022   Provider: Zack Johnson MD        INDICATIONS:  Carson Irvin is a 82 y.o. male who presents for electrodiagnostic evaluation for lumbar radiculopathy by request of Dr. Thea Dumont. His main symptom for evaluation is bilateral leg pain. He confirms a history of lumbar spine surgery. He denies any history of diabetes mellitus, thyroid disease, or chemotherapy. Both limbs are necessary to examine in order to evaluate for any evidence of systemic disease as well as establish normal baseline values from which to compare any abnormal unilateral findings. The study is explained and verbal consent to proceed is obtained. NERVE CONDUCTION STUDIES:    Sensory nerve conduction studies: These studies are limited due to lower extremity edema. Bilateral sural and superficial peroneal sensory nerve conduction studies demonstrate no response. Bilateral lower limb temperatures are normal.     Motor nerve conduction studies: Bilateral peroneal motor nerve conduction studies with pickup over the extensor digitorum brevis demonstrate no response. Given these abnormalities, proximal studies are performed. Bilateral peroneal motor nerve conduction study with pickup over the tibialis anterior demonstrate normal distal latencies and diminished amplitudes. Bilateral tibial motor nerve conduction studies with pickup over the abductor hallucis demonstrate no response. H reflex: Bilateral H reflexes demonstrate no response. ELECTROMYOGRAPHY: A disposable monopolar needle is used to evaluate the left vastus medialis, tibialis anterior, extensor hallucis longus, peroneus longus, medial gastrocnemius, and lateral gastrocnemius. Left extensor hallucis longus and tibialis anterior demonstrate increased insertional activity and +3 abnormal spontaneous activity.  Left medial is active denervation on electromyography in both legs that is currently attributed to the abnormality above. Although not entirely excluded, a superimposed bilateral lumbosacral motor radiculopathy is currently considered less likely. RECOMMENDATIONS: Recommend that the patient follow up with Neurology for further evaluation for peripheral polyneuropathy. The patient should follow up with Dr. Izabela Colorado as previously instructed. If his symptoms persist or worsen, further electrodiagnostic evaluation may be considered if the patient is agreeable. Clinical correlation is recommended.

## 2022-08-08 ENCOUNTER — INITIAL CONSULT (OUTPATIENT)
Dept: INTERVENTIONAL RADIOLOGY/VASCULAR | Age: 87
End: 2022-08-08
Payer: MEDICARE

## 2022-08-08 VITALS
HEART RATE: 88 BPM | SYSTOLIC BLOOD PRESSURE: 132 MMHG | BODY MASS INDEX: 39.53 KG/M2 | WEIGHT: 260 LBS | DIASTOLIC BLOOD PRESSURE: 88 MMHG

## 2022-08-08 DIAGNOSIS — M79.89 PAIN AND SWELLING OF LOWER LEG, UNSPECIFIED LATERALITY: ICD-10-CM

## 2022-08-08 DIAGNOSIS — I73.9 INTERMITTENT CLAUDICATION (HCC): ICD-10-CM

## 2022-08-08 DIAGNOSIS — Z87.891 FORMER SMOKER: ICD-10-CM

## 2022-08-08 DIAGNOSIS — I70.213 ATHEROSCLEROSIS OF NATIVE ARTERY OF BOTH LOWER EXTREMITIES WITH INTERMITTENT CLAUDICATION (HCC): ICD-10-CM

## 2022-08-08 DIAGNOSIS — E66.09 OBESITY DUE TO EXCESS CALORIES, UNSPECIFIED CLASSIFICATION, UNSPECIFIED WHETHER SERIOUS COMORBIDITY PRESENT: ICD-10-CM

## 2022-08-08 DIAGNOSIS — I73.9 PERIPHERAL VASCULAR DISEASE OF EXTREMITY (HCC): Primary | ICD-10-CM

## 2022-08-08 DIAGNOSIS — I10 HYPERTENSION, UNSPECIFIED TYPE: ICD-10-CM

## 2022-08-08 DIAGNOSIS — M79.669 PAIN AND SWELLING OF LOWER LEG, UNSPECIFIED LATERALITY: ICD-10-CM

## 2022-08-08 DIAGNOSIS — Z86.718 HX OF BLOOD CLOTS: ICD-10-CM

## 2022-08-08 PROCEDURE — 99204 OFFICE O/P NEW MOD 45 MIN: CPT | Performed by: NURSE PRACTITIONER

## 2022-08-08 PROCEDURE — 1036F TOBACCO NON-USER: CPT | Performed by: NURSE PRACTITIONER

## 2022-08-08 PROCEDURE — 1123F ACP DISCUSS/DSCN MKR DOCD: CPT | Performed by: NURSE PRACTITIONER

## 2022-08-08 PROCEDURE — G8427 DOCREV CUR MEDS BY ELIG CLIN: HCPCS | Performed by: NURSE PRACTITIONER

## 2022-08-08 PROCEDURE — G8417 CALC BMI ABV UP PARAM F/U: HCPCS | Performed by: NURSE PRACTITIONER

## 2022-08-08 ASSESSMENT — ENCOUNTER SYMPTOMS
COLOR CHANGE: 0
SHORTNESS OF BREATH: 1
DIARRHEA: 0
EYES NEGATIVE: 1
ABDOMINAL PAIN: 0
COUGH: 0
BACK PAIN: 0
NAUSEA: 0
SORE THROAT: 0
WHEEZING: 0
GASTROINTESTINAL NEGATIVE: 1
TROUBLE SWALLOWING: 0
VOMITING: 0

## 2022-08-08 NOTE — PROGRESS NOTES
Vascular Medicine and Interventional Radiology:    Carol Liriano, a male of 82 y.o. came to the office 2022. Chief Complaint   Patient presents with    Leg Pain    Leg Swelling       2022 HPI: Carol Liriano referred by pain management for evaluation of LE pain. Patient presents with symptoms of: both leg with calf pain, throbbing, onset right after his back surgery two years ago. Pain is daily and constant. Both legs with lower leg chronic edema onset several years with progression. Pain he states \"gives him the sensation that his legs won't hold him up at times. \"  Pain is worse with standing and activity. Difficulty walking due to pain, uses cane or walker. Skin hyperpigmentation both lower legs. NSAIDS:  PRN OTC meds with minimal relief. Leg elevation: Daily with minimal relief. Stocking use and dates: Wears daily 15-20 mmhg compression stockings for 6 weeks or greater without relief. On Warfarin for H/O Polycythemia Vera  PAD risk factors: PAD S/P coronary stent , former smoker, obesity, HTN, HLD  H/O bilateral Knee replacements  S/P back surgery  for Spinal Stenosis Dr. Ida Bush  S/P PPM for bradycardia  H/O Afib  H/O mesothelioma Left and will start radiation tx's in a few weeks. H/O DVT LLE several years ago. Denies claudication. Denies chest pain. Denies worsening dyspnea. Exertional dyspnea, chronic.      Family History   Problem Relation Age of Onset    Heart Attack Mother     Other Mother          in MVA    Colon Cancer Other     Cancer Daughter         colon & lung cancer    Colon Cancer Daughter     Breast Cancer Other     Other Father          at age 80    Other Sister          as infant    Other Brother         unknown medical hx    No Known Problems Son     Other Brother          at age 61 due to accident       Past Surgical History:   Procedure Laterality Date    COLONOSCOPY      COLONOSCOPY      CORONARY ANGIOPLASTY WITH STENT PLACEMENT  10/2006    x 1 cardiac stent    ENDOSCOPY, COLON, DIAGNOSTIC      EYE SURGERY      [place] with IOL OU    HERNIA REPAIR  2013    redo ca mesh in XLU.8108 -- umbilical hernia    JOINT REPLACEMENT Bilateral  &     Bilateral TKR    KNEE SURGERY Left      arthroscopic surgery to repair meniscus of left knee    LAMINECTOMY N/A 2021    RIGHT BILATERAL L2-3 3-4 4-5 MICRODECOMPRESSION performed by Carla Suazo MD at [facility]  age 8    [address]  2012        Past Medical History:   Diagnosis Date    A-fib ([facility] [address]     approx 1 year ago-cardioverted    Arthritis     Baker's cyst of knee, left     Cerebral artery occlusion with cerebral infarction ([facility] [address]     TIA sx felt funny --     Congestive heart failure ([facility] [address] 2022    Coronary artery disease     HTN (hypertension)     meds > 20 yrs    Hx of blood clots     DVT left leg     Hyperlipidemia     meds > 20 yrs    Pacemaker 2022    Polycythemia     Torn meniscus     left knee       Social History     Socioeconomic History    Marital status:    Tobacco Use    Smoking status: Former     Packs/day: 0.50     Years: 10.00     Pack years: 5.00     Types: Cigarettes     Quit date: 1968     Years since quittin.3    Smokeless tobacco: Never   Vaping Use    Vaping Use: Never used   Substance and Sexual Activity    Alcohol use: Yes     Comment: social    Drug use: Never       Allergies   Allergen Reactions    Benadryl [Diphenhydramine Hcl] Anxiety       Current Outpatient Medications on File Prior to Visit   Medication Sig Dispense Refill    warfarin (COUMADIN) 2.5 MG tablet Take 4 mg by mouth daily Indications: T-Th-Sat - Sun       Cholecalciferol (VITAMIN D3) 125 MCG (5000 UT) TABS Take by mouth daily      warfarin (COUMADIN) 5 MG tablet Take 5 mg by mouth Twice a Week Indications: Aigfiv-Ccgtcplct-Bjejnf       aspirin 81 MG EC tablet Take 81 mg by mouth daily.         losartan (COZAAR) 100 MG tablet Take 100 mg by mouth in the morning. meloxicam (MOBIC) 15 MG tablet Take 15 mg by mouth in the morning. potassium chloride (KLOR-CON M) 20 MEQ extended release tablet Take 20 mEq by mouth in the morning and 20 mEq before bedtime. pregabalin (LYRICA) 50 MG capsule Take 1 capsule by mouth in the morning and 1 capsule before bedtime. Do all this for 28 days. 90 capsule 3    rosuvastatin (CRESTOR) 10 MG tablet TAKE 1 TABLET BY MOUTH EVERY DAY      dutasteride (AVODART) 0.5 MG capsule Take by mouth      furosemide (LASIX) 20 MG tablet Take by mouth      nitroGLYCERIN (NITROSTAT) 0.4 MG SL tablet Place 0.4 mg under the tongue every 5 minutes as needed for Chest pain up to max of 3 total doses. If no relief after 1 dose, call 911. Omega-3 Fatty Acids (FISH OIL) 1200 MG CAPS Take by mouth daily      dutasteride (AVODART) 0.5 MG capsule Take by mouth daily      CPAP Machine MISC by Does not apply route      furosemide (LASIX) 20 MG tablet Take 20 mg by mouth daily        Current Facility-Administered Medications on File Prior to Visit   Medication Dose Route Frequency Provider Last Rate Last Admin    sodium chloride flush 0.9 % injection 10 mL  10 mL IntraVENous PRN May Patricio MD   10 mL at 07/10/19 1220       Review of Systems   Constitutional: Negative. Negative for chills, fatigue and fever. HENT: Negative. Negative for congestion, ear pain, sore throat and trouble swallowing. Eyes: Negative. Negative for visual disturbance. Respiratory:  Positive for shortness of breath (chronic exertional). Negative for cough and wheezing. Cardiovascular:  Positive for leg swelling. Negative for chest pain and palpitations. Gastrointestinal: Negative. Negative for abdominal pain, diarrhea, nausea and vomiting. Endocrine: Negative. Genitourinary: Negative. Negative for difficulty urinating, dysuria and hematuria. Musculoskeletal:  Positive for gait problem (uses cane or walker). Negative for back pain, neck pain and neck stiffness. Skin: Negative. Negative for color change, rash and wound. Neurological:  Negative for dizziness, weakness, light-headedness, numbness and headaches. Hematological: Negative. Does not bruise/bleed easily. Psychiatric/Behavioral: Negative. The patient is not nervous/anxious. All other systems reviewed and are negative. OBJECTIVE:  /88   Pulse 88   Wt 260 lb (117.9 kg)   BMI 39.53 kg/m²     Physical Exam  Constitutional:       General: He is not in acute distress. Appearance: Normal appearance. He is well-developed. He is not ill-appearing. HENT:      Head: Normocephalic and atraumatic. Nose: Nose normal. No congestion. Neck:      Thyroid: No thyromegaly. Vascular: No JVD. Trachea: No tracheal deviation. Cardiovascular:      Rate and Rhythm: Normal rate. Pulses: Normal pulses. Dorsalis pedis pulses are 2+ on the right side and 2+ on the left side. Posterior tibial pulses are 2+ on the right side and 2+ on the left side. Heart sounds: No murmur heard. Pulmonary:      Effort: Pulmonary effort is normal.   Abdominal:      General: Bowel sounds are normal. There is no distension. Palpations: Abdomen is soft. Musculoskeletal:         General: No tenderness or signs of injury. Normal range of motion. Cervical back: Normal range of motion. Right lower leg: No edema. Left lower leg: No edema. Skin:     General: Skin is warm and dry. Capillary Refill: Capillary refill takes 2 to 3 seconds. Findings: No bruising, erythema or lesion. Neurological:      Mental Status: He is alert and oriented to person, place, and time. Psychiatric:         Mood and Affect: Mood normal.         Behavior: Behavior normal.         ASSESSMENT AND PLAN:    Chart review as noted of referring HCP last OV. Entire Medication list reviewed. Diagnosis Orders   1.  Peripheral vascular disease of extremity ([facility] [address]  US DUP LOWER ART/BYPASS GRAFTS BILATERAL COMPLETE    US NON-INV EXTREMITY VEINS BILAT COMP    US DUP LOWER EXTREMITIES BILATERAL VENOUS      2. Hx of blood clots  US DUP LOWER ART/BYPASS GRAFTS BILATERAL COMPLETE    US NON-INV EXTREMITY VEINS BILAT COMP    US DUP LOWER EXTREMITIES BILATERAL VENOUS      3. Former smoker  US DUP LOWER ART/BYPASS GRAFTS BILATERAL COMPLETE    US NON-INV EXTREMITY VEINS BILAT COMP    US DUP LOWER EXTREMITIES BILATERAL VENOUS      4. Atherosclerosis of native artery of both lower extremities with intermittent claudication ([facility] [address]  US DUP LOWER ART/BYPASS GRAFTS BILATERAL COMPLETE    US NON-INV EXTREMITY VEINS BILAT COMP    US DUP LOWER EXTREMITIES BILATERAL VENOUS      5. Intermittent claudication (HCC)  US DUP LOWER ART/BYPASS GRAFTS BILATERAL COMPLETE    US NON-INV EXTREMITY VEINS BILAT COMP    US DUP LOWER EXTREMITIES BILATERAL VENOUS      6. Hypertension, unspecified type  US DUP LOWER ART/BYPASS GRAFTS BILATERAL COMPLETE    US NON-INV EXTREMITY VEINS BILAT COMP    US DUP LOWER EXTREMITIES BILATERAL VENOUS      7. Obesity due to excess calories, unspecified classification, unspecified whether serious comorbidity present  US DUP LOWER ART/BYPASS GRAFTS BILATERAL COMPLETE    US NON-INV EXTREMITY VEINS BILAT COMP    US DUP LOWER EXTREMITIES BILATERAL VENOUS      8.  Pain and swelling of lower leg, unspecified laterality  US NON-INV EXTREMITY VEINS BILAT COMP    US DUP LOWER EXTREMITIES BILATERAL VENOUS            Plan:     Orders Placed This Encounter   Procedures    US DUP LOWER ART/BYPASS GRAFTS BILATERAL COMPLETE     Standing Status:   Future     Standing Expiration Date:   10/7/2022    US NON-INV EXTREMITY VEINS BILAT COMP           Standing Status:   Future     Standing Expiration Date:   8/8/2023    US DUP LOWER EXTREMITIES BILATERAL VENOUS     Standing Status:   Future     Standing Expiration Date:   8/8/2023      No orders of the defined types were placed in this encounter. --  BLE arterial US with OV follow up results. --  Bilateral LE Venous US duplex and studies evaluate for DVT and/or venous insufficiency with office return for results. Both LE's are studied for CVI for comparison and to evaluate for any systemic disease even if a LE is asymptomatic. Educated in detail disease process of venous insufficiency, purpose of ultrasound, and purpose of compression stockings. Will evaluate need for class II compression to legs once US scan and results done. --  Elevate PRN LE's when sitting and/or lying for management of LE edema. --  Follow up with general practitioner for other medical concerns and management. Thank You Dr. George Tabor for referral of your patient to our practice for care.        EDELMIRA Whitney - CNP

## 2022-08-10 ENCOUNTER — TELEPHONE (OUTPATIENT)
Dept: PAIN MANAGEMENT | Age: 87
End: 2022-08-10

## 2022-08-22 ENCOUNTER — OFFICE VISIT (OUTPATIENT)
Dept: PAIN MANAGEMENT | Age: 87
End: 2022-08-22
Payer: MEDICARE

## 2022-08-22 VITALS
TEMPERATURE: 97.1 F | BODY MASS INDEX: 38.65 KG/M2 | SYSTOLIC BLOOD PRESSURE: 138 MMHG | OXYGEN SATURATION: 97 % | WEIGHT: 255 LBS | HEIGHT: 68 IN | DIASTOLIC BLOOD PRESSURE: 72 MMHG | HEART RATE: 69 BPM

## 2022-08-22 DIAGNOSIS — M79.605 BILATERAL LEG PAIN: ICD-10-CM

## 2022-08-22 DIAGNOSIS — M79.604 BILATERAL LEG PAIN: ICD-10-CM

## 2022-08-22 DIAGNOSIS — M48.062 LUMBAR STENOSIS WITH NEUROGENIC CLAUDICATION: ICD-10-CM

## 2022-08-22 DIAGNOSIS — Z48.89 ENCOUNTER FOR POST SURGICAL WOUND CHECK: ICD-10-CM

## 2022-08-22 DIAGNOSIS — M96.1 LUMBAR POST-LAMINECTOMY SYNDROME: Primary | ICD-10-CM

## 2022-08-22 DIAGNOSIS — D75.1 POLYCYTHEMIA: ICD-10-CM

## 2022-08-22 PROCEDURE — 1036F TOBACCO NON-USER: CPT | Performed by: PAIN MEDICINE

## 2022-08-22 PROCEDURE — 99213 OFFICE O/P EST LOW 20 MIN: CPT | Performed by: PAIN MEDICINE

## 2022-08-22 PROCEDURE — G8427 DOCREV CUR MEDS BY ELIG CLIN: HCPCS | Performed by: PAIN MEDICINE

## 2022-08-22 PROCEDURE — 1123F ACP DISCUSS/DSCN MKR DOCD: CPT | Performed by: PAIN MEDICINE

## 2022-08-22 PROCEDURE — G8417 CALC BMI ABV UP PARAM F/U: HCPCS | Performed by: PAIN MEDICINE

## 2022-08-22 NOTE — PROGRESS NOTES
History of Present Illness     Patient Identification  Izaiah Boateng is a 82 y.o. male. Patient information was obtained from patient. Chief Complaint   Chief Complaint   Patient presents with    Back Pain    Leg Pain     Bilateral        Patient presents with complaint of bilateral calf. This is a result of no known injury. Vascular studies WNL,Onset of pain was 1 year ago after a laminotomy and has been stable since. described as Throbbing and rated as moderate, to both feet. Symptoms include weakness both legs. . The patient denies incontinence, dysuria. The patient denies other injuries. Care prior to arrival consisted of Physical theraphy 1 year ago  with no relief. Stopped Lyrica which made him Goffy Had an EMG that showed Neuropathy, following with Vascular surgeon awaiting doppler reports.     Past Medical History:   Diagnosis Date    A-fib ([facility] [address]     approx 1 year ago-cardioverted    Arthritis     Baker's cyst of knee, left     Cerebral artery occlusion with cerebral infarction ([facility] [address]     TIA sx felt funny --     Congestive heart failure ([facility] [address] 2022    Coronary artery disease     HTN (hypertension)     meds > 20 yrs    Hx of blood clots     DVT left leg     Hyperlipidemia     meds > 20 yrs    Pacemaker 2022    Polycythemia     Torn meniscus     left knee     Family History   Problem Relation Age of Onset    Heart Attack Mother     Other Mother          in MVA    Colon Cancer Other     Cancer Daughter         colon & lung cancer    Colon Cancer Daughter     Breast Cancer Other     Other Father          at age 80    Other Sister          as infant    Other Brother         unknown medical hx    No Known Problems Son     Other Brother          at age 61 due to accident     Current Outpatient Medications   Medication Sig Dispense Refill    losartan (COZAAR) 100 MG tablet Take 100 mg by mouth in the morning.      gabapentin (NEURONTIN) 400 MG capsule Take 400 mg by mouth nightly. meloxicam (MOBIC) 15 MG tablet Take 15 mg by mouth in the morning. potassium chloride (KLOR-CON M) 20 MEQ extended release tablet Take 20 mEq by mouth in the morning and 20 mEq before bedtime. rosuvastatin (CRESTOR) 10 MG tablet TAKE 1 TABLET BY MOUTH EVERY DAY      nitroGLYCERIN (NITROSTAT) 0.4 MG SL tablet Place 0.4 mg under the tongue every 5 minutes as needed for Chest pain up to max of 3 total doses. If no relief after 1 dose, call 911. Omega-3 Fatty Acids (FISH OIL) 1200 MG CAPS Take by mouth daily      Cholecalciferol (VITAMIN D3) 125 MCG (5000 UT) TABS Take by mouth daily      furosemide (LASIX) 20 MG tablet Take 20 mg by mouth daily       aspirin 81 MG EC tablet Take 81 mg by mouth daily. dutasteride (AVODART) 0.5 MG capsule Take by mouth      furosemide (LASIX) 20 MG tablet Take by mouth      warfarin (COUMADIN) 2.5 MG tablet Take 4 mg by mouth daily Indications: T-Th-Sat - Sun       pravastatin (PRAVACHOL) 20 MG tablet every evening (Patient not taking: Reported on 3/8/2022)      dutasteride (AVODART) 0.5 MG capsule Take by mouth daily      CPAP Machine MISC by Does not apply route      warfarin (COUMADIN) 5 MG tablet Take 5 mg by mouth Twice a Week Indications: [id number]-Scldmw        No current facility-administered medications for this visit. Facility-Administered Medications Ordered in Other Visits   Medication Dose Route Frequency Provider Last Rate Last Admin    sodium chloride flush 0.9 % injection 10 mL  10 mL IntraVENous PRN Nabil Cerna MD   10 mL at 07/10/19 1220     Allergies   Allergen Reactions    Benadryl [Diphenhydramine Hcl] Anxiety       Review of Systems  Review of Systems   Constitutional: Negative. HENT: Negative. Eyes: Negative. Respiratory:  Positive for shortness of breath. Mesothelioma. Left Spontanious Pneumothorax, Rib Fx. Cardiovascular: Negative. Afib, 1 stent   Gastrointestinal: Negative. Endocrine: Negative. Genitourinary: Negative. Musculoskeletal: Negative. Skin: Negative. Allergic/Immunologic: Negative. Neurological: Negative. Hematological: Negative. Psychiatric/Behavioral: Negative. All other systems reviewed and are negative. Physical Exam     /88   Pulse 88   Temp 97.8 °F (36.6 °C)   Ht 5' 8\" (1.727 m)   Wt 255 lb (115.7 kg)   SpO2 96%   BMI 38.77 kg/m²   Physical Exam  Vitals and nursing note reviewed. Constitutional:       Appearance: Normal appearance. HENT:      Head: Normocephalic. Right Ear: Ear canal normal.      Left Ear: Ear canal normal.      Nose: Nose normal.      Mouth/Throat:      Mouth: Mucous membranes are moist.   Eyes:      Extraocular Movements: Extraocular movements intact. Conjunctiva/sclera: Conjunctivae normal.      Pupils: Pupils are equal, round, and reactive to light. Cardiovascular:      Rate and Rhythm: Normal rate and regular rhythm. Pulses: Normal pulses. Heart sounds: Normal heart sounds. Pulmonary:      Effort: Pulmonary effort is normal.      Breath sounds: Normal breath sounds. Abdominal:      General: Abdomen is flat. Bowel sounds are normal.      Palpations: Abdomen is soft. Musculoskeletal:         General: Normal range of motion. Cervical back: Normal range of motion and neck supple. Comments: Good pulses bilateral, Surgical scar Seen, No lumbar tenderness, Weakness Plantar extension, Pain on SLRs both sides. Skin:     General: Skin is warm. Capillary Refill: Capillary refill takes less than 2 seconds. Neurological:      General: No focal deficit present. Mental Status: He is alert. Mental status is at baseline. He is disoriented. Psychiatric:         Mood and Affect: Mood normal.         Behavior: Behavior normal.         Thought Content:  Thought content normal.         Judgment: Judgment normal.         Plan     Studies: MRI: L2-3,3-4,4-5 DDD and post

## 2022-08-25 ENCOUNTER — OFFICE VISIT (OUTPATIENT)
Dept: INTERVENTIONAL RADIOLOGY/VASCULAR | Age: 87
End: 2022-08-25
Payer: MEDICARE

## 2022-08-25 VITALS
HEART RATE: 88 BPM | BODY MASS INDEX: 38.65 KG/M2 | DIASTOLIC BLOOD PRESSURE: 78 MMHG | HEIGHT: 68 IN | WEIGHT: 255 LBS | SYSTOLIC BLOOD PRESSURE: 137 MMHG

## 2022-08-25 DIAGNOSIS — M79.89 PAIN AND SWELLING OF LOWER LEG, UNSPECIFIED LATERALITY: ICD-10-CM

## 2022-08-25 DIAGNOSIS — E66.09 OBESITY DUE TO EXCESS CALORIES, UNSPECIFIED CLASSIFICATION, UNSPECIFIED WHETHER SERIOUS COMORBIDITY PRESENT: ICD-10-CM

## 2022-08-25 DIAGNOSIS — Z87.891 FORMER SMOKER: ICD-10-CM

## 2022-08-25 DIAGNOSIS — I73.9 PERIPHERAL VASCULAR DISEASE OF EXTREMITY (HCC): ICD-10-CM

## 2022-08-25 DIAGNOSIS — I87.393 CHRONIC VENOUS HYPERTENSION (IDIOPATHIC) WITH OTHER COMPLICATIONS OF BILATERAL LOWER EXTREMITY: ICD-10-CM

## 2022-08-25 DIAGNOSIS — Z86.718 HX OF BLOOD CLOTS: ICD-10-CM

## 2022-08-25 DIAGNOSIS — M79.669 PAIN AND SWELLING OF LOWER LEG, UNSPECIFIED LATERALITY: ICD-10-CM

## 2022-08-25 DIAGNOSIS — I10 HYPERTENSION, UNSPECIFIED TYPE: ICD-10-CM

## 2022-08-25 DIAGNOSIS — I87.323 CHRONIC VENOUS HYPERTENSION (IDIOPATHIC) WITH INFLAMMATION OF BILATERAL LOWER EXTREMITY: Primary | ICD-10-CM

## 2022-08-25 PROCEDURE — G8427 DOCREV CUR MEDS BY ELIG CLIN: HCPCS | Performed by: NURSE PRACTITIONER

## 2022-08-25 PROCEDURE — 1036F TOBACCO NON-USER: CPT | Performed by: NURSE PRACTITIONER

## 2022-08-25 PROCEDURE — 1123F ACP DISCUSS/DSCN MKR DOCD: CPT | Performed by: NURSE PRACTITIONER

## 2022-08-25 PROCEDURE — 99214 OFFICE O/P EST MOD 30 MIN: CPT | Performed by: NURSE PRACTITIONER

## 2022-08-25 PROCEDURE — G8417 CALC BMI ABV UP PARAM F/U: HCPCS | Performed by: NURSE PRACTITIONER

## 2022-08-25 ASSESSMENT — ENCOUNTER SYMPTOMS
EYES NEGATIVE: 1
VOMITING: 0
COUGH: 0
COLOR CHANGE: 0
WHEEZING: 0
DIARRHEA: 0
SHORTNESS OF BREATH: 1
GASTROINTESTINAL NEGATIVE: 1
SORE THROAT: 0
NAUSEA: 0
TROUBLE SWALLOWING: 0
BACK PAIN: 0
ABDOMINAL PAIN: 0

## 2022-08-25 NOTE — PROGRESS NOTES
Vascular Medicine and Interventional Radiology:    Tavon Mejia, a male of 82 y.o. came to the office 8/25/2022. Chief Complaint   Patient presents with    Results     Ultrasound results     PROGRESS NOTE:     SUBJECTIVE:     8/25/2022: Tavon Mejia returns for results of BLE venous US duplex and US venous insufficiency studies and BLE arterial US studies. BLE arterial US shows no blood flow limiting stenosis. BLE US venous insufficiency studies show   She/he reports BLE symptoms persist unchanged of BLE with generalized weakness making ambulation difficult at times. Onset was right after last back surgery. Has to use cane for stability. Reports this is his most concerning symptom. Today he states he feels BLE symptoms are more related to his neuropathy. H/O peripheral Neuropathy. Other symptoms not as troublesome he reports is both leg with calf pain, throbbing, onset right after his back surgery two years ago. Pain is daily and constant, Both legs with lower leg chronic edema onset several years with progression. Both LE symptoms are equal. States these symptoms are tolerable and adequately managed with daily wearing of class two knee high compression socks. On Warfarin for H/O Afib; coronary stent placed 2006 managed by Dr. Kristyn Francisco. H/O Polycythemia Vera   H/O Mesothelioma. Starting treatments this week. H/O bilateral Knee replacements  S/P back surgery 2021 for Spinal Stenosis Dr. Exie Mcburney  S/P PPM for bradycardia  H/O DVT LLE several years ago. Denies claudication. Denies chest pain. Denies worsening dyspnea. Exertional dyspnea, chronic. Denies BLE troublesome VVs      8/8/2022 HPI: Tavon Mejia referred by pain management for evaluation of LE pain. Patient presents with symptoms of: both leg with calf pain, throbbing, onset right after his back surgery two years ago. Pain is daily and constant. Both legs with lower leg chronic edema onset several years with progression. Pain he states \"gives him the sensation that his legs won't hold him up at times. \"  Pain is worse with standing and activity. Difficulty walking due to pain, uses cane or walker. Skin hyperpigmentation both lower legs. NSAIDS:  PRN OTC meds with minimal relief. Leg elevation: Daily with minimal relief. Stocking use and dates: Wears daily 15-20 mmhg compression stockings for 6 weeks or greater without relief. On Warfarin S/P coronary stent   H/O Polycythemia Vera  PAD risk factors: PAD S/P coronary stent , former smoker, obesity, HTN, HLD  H/O bilateral Knee replacements  S/P back surgery  for Spinal Stenosis Dr. Du Canales  S/P PPM for bradycardia  H/O Afib  H/O mesothelioma Left and will start radiation tx's in a few weeks. H/O DVT LLE several years ago. Denies claudication. Denies chest pain. Denies worsening dyspnea. Exertional dyspnea, chronic.      Family History   Problem Relation Age of Onset    Heart Attack Mother     Other Mother          in MVA    Colon Cancer Other     Cancer Daughter         colon & lung cancer    Colon Cancer Daughter     Breast Cancer Other     Other Father          at age 80    Other Sister          as infant    Other Brother         unknown medical hx    No Known Problems Son     Other Brother          at age 61 due to accident       Past Surgical History:   Procedure Laterality Date    COLONOSCOPY      COLONOSCOPY      CORONARY ANGIOPLASTY WITH STENT PLACEMENT  10/2006    x 1 cardiac stent    ENDOSCOPY, COLON, DIAGNOSTIC      EYE SURGERY      [place] with IOL OU    HERNIA REPAIR  2013    redo ca mesh in [id number] -- umbilical hernia    JOINT REPLACEMENT Bilateral  &     Bilateral TKR    KNEE SURGERY Left      arthroscopic surgery to repair meniscus of left knee    LAMINECTOMY N/A 2021    RIGHT BILATERAL L2-3 3-4 4-5 MICRODECOMPRESSION performed by Melia Schaefer MD at [place]  age 8    [address] REPAIR  2012        Past Medical History:   Diagnosis Date    A-fib ([facility] [address]     approx 1 year ago-cardioverted    Arthritis     Baker's cyst of knee, left     Cerebral artery occlusion with cerebral infarction ([facility] [address]     TIA sx felt funny --     Congestive heart failure ([facility] 2022    Coronary artery disease     HTN (hypertension)     meds > 20 yrs    Hx of blood clots     DVT left leg     Hyperlipidemia     meds > 20 yrs    Pacemaker 2022    Polycythemia     Torn meniscus     left knee       Social History     Socioeconomic History    Marital status:    Tobacco Use    Smoking status: Former     Packs/day: 0.50     Years: 10.00     Pack years: 5.00     Types: Cigarettes     Quit date: 1968     Years since quittin.3    Smokeless tobacco: Never   Vaping Use    Vaping Use: Never used   Substance and Sexual Activity    Alcohol use: Yes     Comment: social    Drug use: Never       Allergies   Allergen Reactions    Benadryl [Diphenhydramine Hcl] Anxiety       Current Outpatient Medications on File Prior to Visit   Medication Sig Dispense Refill    losartan (COZAAR) 100 MG tablet Take 100 mg by mouth in the morning. meloxicam (MOBIC) 15 MG tablet Take 15 mg by mouth in the morning. potassium chloride (KLOR-CON M) 20 MEQ extended release tablet Take 20 mEq by mouth in the morning and 20 mEq before bedtime. pregabalin (LYRICA) 50 MG capsule Take 1 capsule by mouth in the morning and 1 capsule before bedtime. Do all this for 28 days. 90 capsule 3    rosuvastatin (CRESTOR) 10 MG tablet TAKE 1 TABLET BY MOUTH EVERY DAY      dutasteride (AVODART) 0.5 MG capsule Take by mouth      furosemide (LASIX) 20 MG tablet Take by mouth      warfarin (COUMADIN) 2.5 MG tablet Take 4 mg by mouth daily Indications: T--Sat - Sun       nitroGLYCERIN (NITROSTAT) 0.4 MG SL tablet Place 0.4 mg under the tongue every 5 minutes as needed for Chest pain up to max of 3 total doses.  If no relief after 1 dose, call 911. Omega-3 Fatty Acids (FISH OIL) 1200 MG CAPS Take by mouth daily      Cholecalciferol (VITAMIN D3) 125 MCG (5000 UT) TABS Take by mouth daily      dutasteride (AVODART) 0.5 MG capsule Take by mouth daily      CPAP Machine MISC by Does not apply route      warfarin (COUMADIN) 5 MG tablet Take 5 mg by mouth Twice a Week Indications: [id number]       furosemide (LASIX) 20 MG tablet Take 20 mg by mouth daily       aspirin 81 MG EC tablet Take 81 mg by mouth daily. Current Facility-Administered Medications on File Prior to Visit   Medication Dose Route Frequency Provider Last Rate Last Admin    sodium chloride flush 0.9 % injection 10 mL  10 mL IntraVENous PRN Braulio Phillip MD   10 mL at 07/10/19 1220       Review of Systems   Constitutional: Negative. Negative for chills, fatigue and fever. HENT: Negative. Negative for congestion, ear pain, sore throat and trouble swallowing. Eyes: Negative. Negative for visual disturbance. Respiratory:  Positive for shortness of breath (chronic exertional). Negative for cough and wheezing. Cardiovascular:  Positive for leg swelling (mild BLE's). Negative for chest pain and palpitations. Gastrointestinal: Negative. Negative for abdominal pain, diarrhea, nausea and vomiting. Endocrine: Negative. Genitourinary: Negative. Negative for difficulty urinating, dysuria and hematuria. Musculoskeletal:  Positive for gait problem (uses cane or walker). Negative for back pain, neck pain and neck stiffness. Skin: Negative. Negative for color change, rash and wound. Neurological:  Positive for weakness (both legs throughout). Negative for dizziness, light-headedness, numbness and headaches. Hematological: Negative. Does not bruise/bleed easily. Psychiatric/Behavioral: Negative. The patient is not nervous/anxious. All other systems reviewed and are negative.     OBJECTIVE:  /78   Pulse 88   Ht 5' 8\" (1.727 m)   Wt 255 lb (115.7 kg)   BMI 38.77 kg/m²     Physical Exam  Constitutional:       General: He is not in acute distress. Appearance: Normal appearance. He is well-developed. He is not ill-appearing. HENT:      Head: Normocephalic and atraumatic. Nose: Nose normal. No congestion. Neck:      Thyroid: No thyromegaly. Vascular: No JVD. Trachea: No tracheal deviation. Cardiovascular:      Rate and Rhythm: Normal rate. Pulses: Normal pulses. Dorsalis pedis pulses are 2+ on the right side and 2+ on the left side. Posterior tibial pulses are 2+ on the right side and 2+ on the left side. Heart sounds: No murmur heard. Pulmonary:      Effort: Pulmonary effort is normal.   Abdominal:      General: Bowel sounds are normal. There is no distension. Palpations: Abdomen is soft. Musculoskeletal:         General: No tenderness or signs of injury. Normal range of motion. Cervical back: Normal range of motion. Right lower leg: No edema. Left lower leg: No edema. Skin:     General: Skin is warm and dry. Capillary Refill: Capillary refill takes 2 to 3 seconds. Findings: No bruising, erythema or lesion. Neurological:      Mental Status: He is alert and oriented to person, place, and time. Psychiatric:         Mood and Affect: Mood normal.         Behavior: Behavior normal.       BLE aterial US 8/16/2022:   Narrative       US DUP LOWER ART/BYPASS GRAFTS BILATERAL COMPLETE: 8/16/2022 8:36 AM       CLINICAL HISTORY: Z86.718 Hx of blood clots ICD10. Claudication. Atherosclerosis. Peripheral vascular disease. Former smoker. Hypertension. Denies prior vascular surgery. COMPARISON: None available. Grayscale, color and waveform Doppler analysis of both lower extremity arterial systems was performed. FINDINGS:       Moderate atherosclerotic disease, with pulsatile mild to moderately diminished waveforms worsening distally. There are no significantly elevated velocities to suggest a flow-limiting stenosis, or arterial occlusion. The arterial system is normal in caliber, without evidence of aneurysm or dissection. Collateral vessels in the calf. Maximum systolic velocities are:           RIGHT LOWER EXTREMITY:       Right common femoral artery 83 cm/s. Right proximal superficial femoral artery 89 cm/s. Right mid superficial femoral artery 66 cm/s. Right distal superficial femoral artery 63 cm/s. Right popliteal artery 41 cm/s. Right proximal anterior tibial artery 42 cm/s. Right mid anterior tibial artery 58  cm/s. Right distal anterior tibial artery 45 cm/s. Right proximal posterior tibial artery 20 cm/s. Right mid posterior tibial artery 50  cm/s. Right distal posterior tibial artery 14  cm/s. Right proximal peroneal artery 24 cm/s. Right mid peroneal artery 70 cm/s. Right distal peroneal artery 42 cm/s. LEFT LOWER EXTREMITY:       Left common femoral artery 103 cm/s. Left proximal superficial femoral artery 73 cm/s. Left mid superficial femoral artery 102 cm/s. Left distal superficial femoral artery 53 cm/s. Left popliteal artery 54 cm/s. Left proximal anterior tibial artery 49 cm/s. Left mid anterior tibial artery 79  cm/s. Left distal anterior tibial artery 77 cm/s. Left proximal posterior tibial artery 39 cm/s. Left mid posterior tibial artery 26  cm/s. Left distal posterior tibial artery 43  cm/s. Left proximal peroneal artery 55 cm/s. Left mid peroneal artery 48 cm/s. Left distal peroneal artery 59 cm/s. Impression       No evidence of a flow-limiting stenosis, arterial occlusion, or aneurysm. 8/16/2022 BLE Venous CVI:   Impression   1. No signs of deep venous thrombosis in the bilateral lower extremities.    2.There is venous insufficiency of the right great saphenous vein at the 1 each by Does not apply route daily Thigh high 20-30 mmhg graduated compression stockings both legs wear daily during day and off Qhs. Wear as tolerated. Do not wear if they cause increased pain. Dispense:  1 each     Refill:  5         --  given his BLE symptoms and results of US insufficiency studies, symptoms do not appear to be of vascular nature. Most concerning is BLE weakness. Arterial US shows no PAD bilaterally. There is venous insufficiency to BLE's in both GSV and SSV. However CVI to RLE is of trace amount in both veins. He states BLE symptoms are equal. Therefore again do not feel BLE symptoms are of vascular etiology. Both BLE US results were discussed in detail with the patient. I discussed treatment options for LLE CVI including venous procedures with Venaseal closure system or daily wearing of thigh high compression 20-30 mmhg socks. At this time he would like to continue treatment with daily wearing of compression socks and no venous procedures. --  Due to CVI to BLE's, recommend wearing thigh high 20-30 mmhg compression socks daily during day and off nightly as tolerated for management. --  Rx thigh high 20-30 mmhg compression socks    --  Return one year follow up. --  Elevate BLE's PRN for management of edema. --  Follow up with general practitioner for other medical concerns and management. --  Follow back with referring pain management Dr. George Tabor. Total time spent for this encounter:  38  minutes.        EDELMIRA Whitney - CNP

## 2022-09-19 ENCOUNTER — OFFICE VISIT (OUTPATIENT)
Dept: PAIN MANAGEMENT | Age: 87
End: 2022-09-19
Payer: MEDICARE

## 2022-09-19 VITALS
WEIGHT: 260 LBS | HEIGHT: 68 IN | TEMPERATURE: 97.9 F | OXYGEN SATURATION: 96 % | BODY MASS INDEX: 39.4 KG/M2 | SYSTOLIC BLOOD PRESSURE: 132 MMHG | HEART RATE: 74 BPM | DIASTOLIC BLOOD PRESSURE: 86 MMHG

## 2022-09-19 DIAGNOSIS — Z48.89 ENCOUNTER FOR POST SURGICAL WOUND CHECK: ICD-10-CM

## 2022-09-19 DIAGNOSIS — M48.062 LUMBAR STENOSIS WITH NEUROGENIC CLAUDICATION: ICD-10-CM

## 2022-09-19 DIAGNOSIS — M79.605 BILATERAL LEG PAIN: ICD-10-CM

## 2022-09-19 DIAGNOSIS — M96.1 LUMBAR POST-LAMINECTOMY SYNDROME: Primary | ICD-10-CM

## 2022-09-19 DIAGNOSIS — D75.1 POLYCYTHEMIA: ICD-10-CM

## 2022-09-19 DIAGNOSIS — M79.604 BILATERAL LEG PAIN: ICD-10-CM

## 2022-09-19 PROCEDURE — 1123F ACP DISCUSS/DSCN MKR DOCD: CPT | Performed by: PAIN MEDICINE

## 2022-09-19 PROCEDURE — 1036F TOBACCO NON-USER: CPT | Performed by: PAIN MEDICINE

## 2022-09-19 PROCEDURE — G8417 CALC BMI ABV UP PARAM F/U: HCPCS | Performed by: PAIN MEDICINE

## 2022-09-19 PROCEDURE — G8427 DOCREV CUR MEDS BY ELIG CLIN: HCPCS | Performed by: PAIN MEDICINE

## 2022-09-19 PROCEDURE — 99213 OFFICE O/P EST LOW 20 MIN: CPT | Performed by: PAIN MEDICINE

## 2022-09-19 NOTE — PROGRESS NOTES
History of Present Illness     Patient Identification  Marie Gomez is a 82 y.o. male. Patient information was obtained from patient. Chief Complaint   Chief Complaint   Patient presents with    Back Pain    Leg Pain     Bilateral        Patient presents with complaint of bilateral calf. This is a result of no known injury. Vascular studies WNL,Onset of pain was 1 year ago after a laminotomy and has been stable since. described as Throbbing and rated as moderate, to both feet. Symptoms include weakness both legs. . The patient denies incontinence, dysuria. The patient denies other injuries. Care prior to arrival consisted of Physical theraphy 1 year ago  with no relief. Stopped Lyrica which made him Goffy Had an EMG that showed Neuropathy, following with Vascular surgeon was told no circulation issues, Continues to have pain both calves.     Past Medical History:   Diagnosis Date    A-fib ([facility] [address]     approx 1 year ago-cardioverted    Arthritis     Baker's cyst of knee, left     Cerebral artery occlusion with cerebral infarction ([facility] [address]     TIA sx felt funny --     Congestive heart failure ([facility] [address] 2022    Coronary artery disease     HTN (hypertension)     meds > 20 yrs    Hx of blood clots     DVT left leg     Hyperlipidemia     meds > 20 yrs    Pacemaker 2022    Polycythemia     Torn meniscus     left knee     Family History   Problem Relation Age of Onset    Heart Attack Mother     Other Mother          in MVA    Colon Cancer Other     Cancer Daughter         colon & lung cancer    Colon Cancer Daughter     Breast Cancer Other     Other Father          at age 80    Other Sister          as infant    Other Brother         unknown medical hx    No Known Problems Son     Other Brother          at age 61 due to accident     Current Outpatient Medications   Medication Sig Dispense Refill    losartan (COZAAR) 100 MG tablet Take 100 mg by mouth in the morning.      gabapentin Afib, 1 stent   Gastrointestinal: Negative. Endocrine: Negative. Genitourinary: Negative. Musculoskeletal: Negative. Skin: Negative. Allergic/Immunologic: Negative. Neurological: Negative. Hematological: Negative. Psychiatric/Behavioral: Negative. All other systems reviewed and are negative. Physical Exam     /88   Pulse 88   Temp 97.8 °F (36.6 °C)   Ht 5' 8\" (1.727 m)   Wt 255 lb (115.7 kg)   SpO2 96%   BMI 38.77 kg/m²   Physical Exam  Vitals and nursing note reviewed. Constitutional:       Appearance: Normal appearance. HENT:      Head: Normocephalic. Right Ear: Ear canal normal.      Left Ear: Ear canal normal.      Nose: Nose normal.      Mouth/Throat:      Mouth: Mucous membranes are moist.   Eyes:      Extraocular Movements: Extraocular movements intact. Conjunctiva/sclera: Conjunctivae normal.      Pupils: Pupils are equal, round, and reactive to light. Cardiovascular:      Rate and Rhythm: Normal rate and regular rhythm. Pulses: Normal pulses. Heart sounds: Normal heart sounds. Pulmonary:      Effort: Pulmonary effort is normal.      Breath sounds: Normal breath sounds. Abdominal:      General: Abdomen is flat. Bowel sounds are normal.      Palpations: Abdomen is soft. Musculoskeletal:         General: Normal range of motion. Cervical back: Normal range of motion and neck supple. Comments: Good pulses bilateral, Surgical scar Seen, No lumbar tenderness, Weakness Plantar extension, Pain on SLRs both sides. Skin:     General: Skin is warm. Capillary Refill: Capillary refill takes less than 2 seconds. Neurological:      General: No focal deficit present. Mental Status: He is alert. Mental status is at baseline. He is disoriented. Psychiatric:         Mood and Affect: Mood normal.         Behavior: Behavior normal.         Thought Content:  Thought content normal.         Judgment: Judgment normal.         Plan Studies: MRI: L2-3,3-4,4-5 DDD and post laminotomies no new changes. Patient presents with complaint of bilateral calf. This is a result of no known injury. Vascular studies WNL,Onset of pain was 1 year ago after a laminotomy and has been stable since. described as Throbbing and rated as moderate, to both feet. Symptoms include weakness both legs. . The patient denies incontinence, dysuria. The patient denies other injuries. Care prior to arrival consisted of Physical theraphy 1 year ago  with no relief. Stopped Lyrica which made him Goffy Had an EMG that showed Neuropathy, following with Vascular surgeon was told no circulation issues, Continues to have pain both calves. follow up in 1-2 weeks will plan stim as needed at that time. Pending psych eval and stopping Coumadin.

## 2022-09-23 ENCOUNTER — TELEPHONE (OUTPATIENT)
Dept: PAIN MANAGEMENT | Age: 87
End: 2022-09-23

## 2022-09-23 NOTE — TELEPHONE ENCOUNTER
Lm for PT to call back to make aware:   Dr Josafat Strange Md.   [address], [address]   [place], [address]   Phone [phone]

## 2022-09-23 NOTE — TELEPHONE ENCOUNTER
Patient states that Dr. Loomis Stands is not doing psych evals at this time. He is asking if there is another doctor that Dr. Neha Frazier recommends?

## 2022-10-18 ENCOUNTER — TELEPHONE (OUTPATIENT)
Dept: PAIN MANAGEMENT | Age: 87
End: 2022-10-18

## 2022-10-18 NOTE — TELEPHONE ENCOUNTER
Ezra Asif from Dr. Giovanny Ahmadi office called asking for a referral and office notes for the patient to be seen for the psych eval Dr Andrez Quinones referred him for. Fax number is [phone].

## 2022-10-25 ENCOUNTER — OFFICE VISIT (OUTPATIENT)
Dept: PAIN MANAGEMENT | Age: 87
End: 2022-10-25
Payer: MEDICARE

## 2022-10-25 VITALS
HEART RATE: 71 BPM | DIASTOLIC BLOOD PRESSURE: 86 MMHG | TEMPERATURE: 97.1 F | OXYGEN SATURATION: 88 % | HEIGHT: 68 IN | BODY MASS INDEX: 39.4 KG/M2 | SYSTOLIC BLOOD PRESSURE: 130 MMHG | WEIGHT: 260 LBS

## 2022-10-25 DIAGNOSIS — M96.1 LUMBAR POST-LAMINECTOMY SYNDROME: Primary | ICD-10-CM

## 2022-10-25 DIAGNOSIS — Z48.89 ENCOUNTER FOR POST SURGICAL WOUND CHECK: ICD-10-CM

## 2022-10-25 DIAGNOSIS — M48.062 LUMBAR STENOSIS WITH NEUROGENIC CLAUDICATION: ICD-10-CM

## 2022-10-25 DIAGNOSIS — D75.1 POLYCYTHEMIA: ICD-10-CM

## 2022-10-25 DIAGNOSIS — M79.605 BILATERAL LEG PAIN: ICD-10-CM

## 2022-10-25 DIAGNOSIS — M79.604 BILATERAL LEG PAIN: ICD-10-CM

## 2022-10-25 PROCEDURE — G8484 FLU IMMUNIZE NO ADMIN: HCPCS | Performed by: PAIN MEDICINE

## 2022-10-25 PROCEDURE — G8417 CALC BMI ABV UP PARAM F/U: HCPCS | Performed by: PAIN MEDICINE

## 2022-10-25 PROCEDURE — G8427 DOCREV CUR MEDS BY ELIG CLIN: HCPCS | Performed by: PAIN MEDICINE

## 2022-10-25 PROCEDURE — 1123F ACP DISCUSS/DSCN MKR DOCD: CPT | Performed by: PAIN MEDICINE

## 2022-10-25 PROCEDURE — 99213 OFFICE O/P EST LOW 20 MIN: CPT | Performed by: PAIN MEDICINE

## 2022-10-25 PROCEDURE — 1036F TOBACCO NON-USER: CPT | Performed by: PAIN MEDICINE

## 2022-10-25 NOTE — PROGRESS NOTES
History of Present Illness     Patient Identification  Leroy Silva is a 82 y.o. male. Patient information was obtained from patient. Chief Complaint   Chief Complaint   Patient presents with    Back Pain    Leg Pain     Bilateral        Patient presents with complaint of bilateral calf. This is a result of no known injury. Vascular studies WNL,Onset of pain was 1 year ago after a laminotomy and has been stable since. described as Throbbing and rated as moderate, to both feet. Symptoms include weakness both legs. . The patient denies incontinence, dysuria. The patient denies other injuries. Care prior to arrival consisted of Physical theraphy 1 year ago  with no relief. Stopped Lyrica which made him Goffy Had an EMG that showed Neuropathy, following with Vascular surgeon was told no circulation issues, Continues to have pain both calves.     Past Medical History:   Diagnosis Date    A-fib ([facility] [address]     approx 1 year ago-cardioverted    Arthritis     Baker's cyst of knee, left     Cerebral artery occlusion with cerebral infarction ([facility] [address]     TIA sx felt funny --     Congestive heart failure ([facility] [address] 2022    Coronary artery disease     HTN (hypertension)     meds > 20 yrs    Hx of blood clots     DVT left leg     Hyperlipidemia     meds > 20 yrs    Pacemaker 2022    Polycythemia     Torn meniscus     left knee     Family History   Problem Relation Age of Onset    Heart Attack Mother     Other Mother          in MVA    Colon Cancer Other     Cancer Daughter         colon & lung cancer    Colon Cancer Daughter     Breast Cancer Other     Other Father          at age 80    Other Sister          as infant    Other Brother         unknown medical hx    No Known Problems Son     Other Brother          at age 61 due to accident     Current Outpatient Medications   Medication Sig Dispense Refill    losartan (COZAAR) 100 MG tablet Take 100 mg by mouth in the morning.      gabapentin (NEURONTIN) 400 MG capsule Take 400 mg by mouth nightly. meloxicam (MOBIC) 15 MG tablet Take 15 mg by mouth in the morning. potassium chloride (KLOR-CON M) 20 MEQ extended release tablet Take 20 mEq by mouth in the morning and 20 mEq before bedtime. rosuvastatin (CRESTOR) 10 MG tablet TAKE 1 TABLET BY MOUTH EVERY DAY      nitroGLYCERIN (NITROSTAT) 0.4 MG SL tablet Place 0.4 mg under the tongue every 5 minutes as needed for Chest pain up to max of 3 total doses. If no relief after 1 dose, call 911. Omega-3 Fatty Acids (FISH OIL) 1200 MG CAPS Take by mouth daily      Cholecalciferol (VITAMIN D3) 125 MCG (5000 UT) TABS Take by mouth daily      furosemide (LASIX) 20 MG tablet Take 20 mg by mouth daily       aspirin 81 MG EC tablet Take 81 mg by mouth daily. dutasteride (AVODART) 0.5 MG capsule Take by mouth      furosemide (LASIX) 20 MG tablet Take by mouth      warfarin (COUMADIN) 2.5 MG tablet Take 4 mg by mouth daily Indications: T-Th-Sat - Sun       pravastatin (PRAVACHOL) 20 MG tablet every evening (Patient not taking: Reported on 3/8/2022)      dutasteride (AVODART) 0.5 MG capsule Take by mouth daily      CPAP Machine MISC by Does not apply route      warfarin (COUMADIN) 5 MG tablet Take 5 mg by mouth Twice a Week Indications: Otmdat-Xefvrmrcc-Eyihqv        No current facility-administered medications for this visit. Facility-Administered Medications Ordered in Other Visits   Medication Dose Route Frequency Provider Last Rate Last Admin    sodium chloride flush 0.9 % injection 10 mL  10 mL IntraVENous PRN Lena Saldivar MD   10 mL at 07/10/19 1220     Allergies   Allergen Reactions    Benadryl [Diphenhydramine Hcl] Anxiety       Review of Systems  Review of Systems   Constitutional: Negative. HENT: Negative. Eyes: Negative. Respiratory:  Positive for shortness of breath. Mesothelioma. Left Spontanious Pneumothorax, Rib Fx. Cardiovascular: Negative. Afib, 1 stent   Gastrointestinal: Negative. Endocrine: Negative. Genitourinary: Negative. Musculoskeletal: Negative. Skin: Negative. Allergic/Immunologic: Negative. Neurological: Negative. Hematological: Negative. Psychiatric/Behavioral: Negative. All other systems reviewed and are negative. Physical Exam     /88   Pulse 88   Temp 97.8 °F (36.6 °C)   Ht 5' 8\" (1.727 m)   Wt 255 lb (115.7 kg)   SpO2 96%   BMI 38.77 kg/m²   Physical Exam  Vitals and nursing note reviewed. Constitutional:       Appearance: Normal appearance. HENT:      Head: Normocephalic. Right Ear: Ear canal normal.      Left Ear: Ear canal normal.      Nose: Nose normal.      Mouth/Throat:      Mouth: Mucous membranes are moist.   Eyes:      Extraocular Movements: Extraocular movements intact. Conjunctiva/sclera: Conjunctivae normal.      Pupils: Pupils are equal, round, and reactive to light. Cardiovascular:      Rate and Rhythm: Normal rate and regular rhythm. Pulses: Normal pulses. Heart sounds: Normal heart sounds. Pulmonary:      Effort: Pulmonary effort is normal.      Breath sounds: Normal breath sounds. Abdominal:      General: Abdomen is flat. Bowel sounds are normal.      Palpations: Abdomen is soft. Musculoskeletal:         General: Normal range of motion. Cervical back: Normal range of motion and neck supple. Comments: Good pulses bilateral, Surgical scar Seen, No lumbar tenderness, Weakness Plantar extension, Pain on SLRs both sides. Skin:     General: Skin is warm. Capillary Refill: Capillary refill takes less than 2 seconds. Neurological:      General: No focal deficit present. Mental Status: He is alert. Mental status is at baseline. He is disoriented. Psychiatric:         Mood and Affect: Mood normal.         Behavior: Behavior normal.         Thought Content:  Thought content normal.         Judgment: Judgment normal.         Plan Studies: MRI: L2-3,3-4,4-5 DDD and post laminotomies no new changes. Patient presents with complaint of bilateral calf. This is a result of no known injury. Vascular studies WNL,Onset of pain was 1 year ago after a laminotomy and has been stable since. described as Throbbing and rated as moderate, to both feet. Symptoms include weakness both legs. . The patient denies incontinence, dysuria. The patient denies other injuries. Care prior to arrival consisted of Physical theraphy 1 year ago  with no relief. Stopped Lyrica which made him Goffy Had an EMG that showed Neuropathy, following with Vascular surgeon was told no circulation issues, Continues to have pain both calves. follow up in 1-2 weeks will plan stim as needed at that time. Got  psych eval awaiting reports and stopping Coumadin.

## 2022-10-25 NOTE — TELEPHONE ENCOUNTER
Per Dr. Jose Miguel Alex seen patient today. I have faxed Dr. Sharmaine Aviles office notes: 7/25/22, 8/22/22, and 9/19/22 along with referral.     I called Dr. Brenda West office and spoke with Antonio Nelson to see if she can fax us Dr. Carline Weems office notes for Dr. Jose Miguel Alex to review.

## 2022-10-31 ENCOUNTER — TELEPHONE (OUTPATIENT)
Dept: NEUROSURGERY | Age: 87
End: 2022-10-31

## 2022-11-02 NOTE — TELEPHONE ENCOUNTER
I CALLED PT AND LM WITH FEMALE. WHEN PT CALLS BACK, PLEASE SCHEDULE FOLLOW UP APPT WITH DR. Bravo Or.

## 2022-11-11 ENCOUNTER — OFFICE VISIT (OUTPATIENT)
Dept: PAIN MANAGEMENT | Age: 87
End: 2022-11-11
Payer: MEDICARE

## 2022-11-11 VITALS
BODY MASS INDEX: 38.65 KG/M2 | SYSTOLIC BLOOD PRESSURE: 122 MMHG | WEIGHT: 255 LBS | DIASTOLIC BLOOD PRESSURE: 74 MMHG | HEART RATE: 77 BPM | HEIGHT: 68 IN | OXYGEN SATURATION: 98 % | TEMPERATURE: 97 F

## 2022-11-11 DIAGNOSIS — M79.604 BILATERAL LEG PAIN: ICD-10-CM

## 2022-11-11 DIAGNOSIS — M79.605 BILATERAL LEG PAIN: ICD-10-CM

## 2022-11-11 DIAGNOSIS — M96.1 LUMBAR POST-LAMINECTOMY SYNDROME: Primary | ICD-10-CM

## 2022-11-11 DIAGNOSIS — M48.062 LUMBAR STENOSIS WITH NEUROGENIC CLAUDICATION: ICD-10-CM

## 2022-11-11 DIAGNOSIS — Z48.89 ENCOUNTER FOR POST SURGICAL WOUND CHECK: ICD-10-CM

## 2022-11-11 PROCEDURE — G8417 CALC BMI ABV UP PARAM F/U: HCPCS | Performed by: PAIN MEDICINE

## 2022-11-11 PROCEDURE — G8484 FLU IMMUNIZE NO ADMIN: HCPCS | Performed by: PAIN MEDICINE

## 2022-11-11 PROCEDURE — 1123F ACP DISCUSS/DSCN MKR DOCD: CPT | Performed by: PAIN MEDICINE

## 2022-11-11 PROCEDURE — 99213 OFFICE O/P EST LOW 20 MIN: CPT | Performed by: PAIN MEDICINE

## 2022-11-11 PROCEDURE — 1036F TOBACCO NON-USER: CPT | Performed by: PAIN MEDICINE

## 2022-11-11 PROCEDURE — G8427 DOCREV CUR MEDS BY ELIG CLIN: HCPCS | Performed by: PAIN MEDICINE

## 2022-11-11 NOTE — PROGRESS NOTES
History of Present Illness     Patient Identification  Mona Matias is a 82 y.o. male. Patient information was obtained from patient. Chief Complaint   Chief Complaint   Patient presents with    Back Pain    Leg Pain     Bilateral        Patient presents with complaint of bilateral calf. This is a result of no known injury. Vascular studies WNL,Onset of pain was 1 year ago after a laminotomy and has been stable since. described as Throbbing and rated as moderate, to both feet. Symptoms include weakness both legs. . The patient denies incontinence, dysuria. The patient denies other injuries. Care prior to arrival consisted of Physical theraphy 1 year ago  with no relief. Stopped Lyrica which made him Goffy Had an EMG that showed Neuropathy, following with Vascular surgeon was told no circulation issues, Continues to have pain both calves.     Past Medical History:   Diagnosis Date    A-fib ([facility] [address]     approx 1 year ago-cardioverted    Arthritis     Baker's cyst of knee, left     Cerebral artery occlusion with cerebral infarction ([facility] [address]     TIA sx felt funny --     Congestive heart failure ([facility] [address] 2022    Coronary artery disease     HTN (hypertension)     meds > 20 yrs    Hx of blood clots     DVT left leg     Hyperlipidemia     meds > 20 yrs    Pacemaker 2022    Polycythemia     Torn meniscus     left knee     Family History   Problem Relation Age of Onset    Heart Attack Mother     Other Mother          in MVA    Colon Cancer Other     Cancer Daughter         colon & lung cancer    Colon Cancer Daughter     Breast Cancer Other     Other Father          at age 80    Other Sister          as infant    Other Brother         unknown medical hx    No Known Problems Son     Other Brother          at age 61 due to accident     Current Outpatient Medications   Medication Sig Dispense Refill    losartan (COZAAR) 100 MG tablet Take 100 mg by mouth in the morning.      gabapentin (NEURONTIN) 400 MG capsule Take 400 mg by mouth nightly. meloxicam (MOBIC) 15 MG tablet Take 15 mg by mouth in the morning. potassium chloride (KLOR-CON M) 20 MEQ extended release tablet Take 20 mEq by mouth in the morning and 20 mEq before bedtime. rosuvastatin (CRESTOR) 10 MG tablet TAKE 1 TABLET BY MOUTH EVERY DAY      nitroGLYCERIN (NITROSTAT) 0.4 MG SL tablet Place 0.4 mg under the tongue every 5 minutes as needed for Chest pain up to max of 3 total doses. If no relief after 1 dose, call 911. Omega-3 Fatty Acids (FISH OIL) 1200 MG CAPS Take by mouth daily      Cholecalciferol (VITAMIN D3) 125 MCG (5000 UT) TABS Take by mouth daily      furosemide (LASIX) 20 MG tablet Take 20 mg by mouth daily       aspirin 81 MG EC tablet Take 81 mg by mouth daily. dutasteride (AVODART) 0.5 MG capsule Take by mouth      furosemide (LASIX) 20 MG tablet Take by mouth      warfarin (COUMADIN) 2.5 MG tablet Take 4 mg by mouth daily Indications: T-Th-Sat - Sun       pravastatin (PRAVACHOL) 20 MG tablet every evening (Patient not taking: Reported on 3/8/2022)      dutasteride (AVODART) 0.5 MG capsule Take by mouth daily      CPAP Machine MISC by Does not apply route      warfarin (COUMADIN) 5 MG tablet Take 5 mg by mouth Twice a Week Indications: Lleqgo-Rvvlwbbvu-Pnlctx        No current facility-administered medications for this visit. Facility-Administered Medications Ordered in Other Visits   Medication Dose Route Frequency Provider Last Rate Last Admin    sodium chloride flush 0.9 % injection 10 mL  10 mL IntraVENous PRN José Rick MD   10 mL at 07/10/19 1220     Allergies   Allergen Reactions    Benadryl [Diphenhydramine Hcl] Anxiety       Review of Systems  Review of Systems   Constitutional: Negative. HENT: Negative. Eyes: Negative. Respiratory:  Positive for shortness of breath. Mesothelioma. Left Spontanious Pneumothorax, Rib Fx. Cardiovascular: Negative. Afib, 1 stent   Gastrointestinal: Negative. Endocrine: Negative. Genitourinary: Negative. Musculoskeletal: Negative. Skin: Negative. Allergic/Immunologic: Negative. Neurological: Negative. Hematological: Negative. Psychiatric/Behavioral: Negative. All other systems reviewed and are negative. Physical Exam     /88   Pulse 88   Temp 97.8 °F (36.6 °C)   Ht 5' 8\" (1.727 m)   Wt 255 lb (115.7 kg)   SpO2 96%   BMI 38.77 kg/m²   Physical Exam  Vitals and nursing note reviewed. Constitutional:       Appearance: Normal appearance. HENT:      Head: Normocephalic. Right Ear: Ear canal normal.      Left Ear: Ear canal normal.      Nose: Nose normal.      Mouth/Throat:      Mouth: Mucous membranes are moist.   Eyes:      Extraocular Movements: Extraocular movements intact. Conjunctiva/sclera: Conjunctivae normal.      Pupils: Pupils are equal, round, and reactive to light. Cardiovascular:      Rate and Rhythm: Normal rate and regular rhythm. Pulses: Normal pulses. Heart sounds: Normal heart sounds. Pulmonary:      Effort: Pulmonary effort is normal.      Breath sounds: Normal breath sounds. Abdominal:      General: Abdomen is flat. Bowel sounds are normal.      Palpations: Abdomen is soft. Musculoskeletal:         General: Normal range of motion. Cervical back: Normal range of motion and neck supple. Comments: Good pulses bilateral, Surgical scar Seen, No lumbar tenderness, Weakness Plantar extension, Pain on SLRs both sides. Skin:     General: Skin is warm. Capillary Refill: Capillary refill takes less than 2 seconds. Neurological:      General: No focal deficit present. Mental Status: He is alert. Mental status is at baseline. He is oriented. Psychiatric:         Mood and Affect: Mood normal.         Behavior: Behavior normal.         Thought Content:  Thought content normal.         Judgment: Judgment normal.         Plan Studies: MRI: L2-3,3-4,4-5 DDD and post laminotomies no new changes. Patient presents with complaint of bilateral calf. This is a result of no known injury. Vascular studies WNL,Onset of pain was 1 year ago after a laminotomy and has been stable since. described as Throbbing and rated as moderate, to both feet. Symptoms include weakness both legs. . The patient denies incontinence, dysuria. The patient denies other injuries. Care prior to arrival consisted of Physical theraphy 1 year ago  with no relief. Stopped Lyrica which made him Goffy Had an EMG that showed Neuropathy, following with Vascular surgeon was told no circulation issues, Continues to have pain both calves. follow up in 1-2 weeks will plan stim as needed at that time. Got  psych eval with positive will plan on Stim TRIAL.

## 2022-11-16 ENCOUNTER — TELEPHONE (OUTPATIENT)
Dept: NEUROSURGERY | Age: 87
End: 2022-11-16

## 2022-11-16 NOTE — TELEPHONE ENCOUNTER
SURGERY DATE:  12/13/2022 - SPINAL CORD STIMULATOR TRIAL      PLEASE CONFIRM HOW LONG YOU WANT THE PATIENT OFF THE FOLLOWING BLOOD THINNERS PRIOR TO SURGERY:    WARFARIN  80 MG ASPIRIN  MELOXICAM    THANK YOU!

## 2023-01-03 ENCOUNTER — HOSPITAL ENCOUNTER (OUTPATIENT)
Dept: PREADMISSION TESTING | Age: 88
Discharge: HOME | End: 2023-01-07
Payer: MEDICARE

## 2023-01-03 VITALS
WEIGHT: 268 LBS | HEART RATE: 61 BPM | SYSTOLIC BLOOD PRESSURE: 110 MMHG | HEIGHT: 67 IN | TEMPERATURE: 98.6 F | DIASTOLIC BLOOD PRESSURE: 58 MMHG | BODY MASS INDEX: 42.06 KG/M2 | RESPIRATION RATE: 16 BRPM | OXYGEN SATURATION: 98 %

## 2023-01-03 DIAGNOSIS — M96.1 POSTLAMINECTOMY SYNDROME, LUMBAR REGION: ICD-10-CM

## 2023-01-03 PROBLEM — I49.5 SICK SINUS SYNDROME (HCC): Status: ACTIVE | Noted: 2022-07-19

## 2023-01-03 PROBLEM — E78.2 MIXED HYPERLIPIDEMIA: Status: ACTIVE | Noted: 2022-05-03

## 2023-01-03 PROBLEM — I12.9 HYPERTENSIVE CHRONIC KIDNEY DISEASE W STG 1-4/UNSP CHR KDNY: Status: ACTIVE | Noted: 2022-05-03

## 2023-01-03 PROBLEM — Z95.0 PRESENCE OF CARDIAC PACEMAKER: Status: ACTIVE | Noted: 2022-09-15

## 2023-01-03 PROBLEM — C45.9 MESOTHELIOMA (HCC): Status: ACTIVE | Noted: 2023-01-03

## 2023-01-03 LAB
ANION GAP SERPL CALCULATED.3IONS-SCNC: 12 MEQ/L (ref 9–15)
APTT: 41.9 SEC (ref 24.4–36.8)
BUN BLDV-MCNC: 25 MG/DL (ref 8–23)
CALCIUM SERPL-MCNC: 9.4 MG/DL (ref 8.5–9.9)
CHLORIDE BLD-SCNC: 107 MEQ/L (ref 95–107)
CO2: 23 MEQ/L (ref 20–31)
CREAT SERPL-MCNC: 1.5 MG/DL (ref 0.7–1.2)
EKG ATRIAL RATE: 61 BPM
EKG P AXIS: 27 DEGREES
EKG P-R INTERVAL: 300 MS
EKG Q-T INTERVAL: 406 MS
EKG QRS DURATION: 90 MS
EKG QTC CALCULATION (BAZETT): 408 MS
EKG R AXIS: 40 DEGREES
EKG T AXIS: 31 DEGREES
EKG VENTRICULAR RATE: 61 BPM
GFR SERPL CREATININE-BSD FRML MDRD: 45.2 ML/MIN/{1.73_M2}
GLUCOSE BLD-MCNC: 104 MG/DL (ref 70–99)
HCT VFR BLD CALC: 36.4 % (ref 42–52)
HEMOGLOBIN: 12.1 G/DL (ref 14–18)
INR BLD: 2
MCH RBC QN AUTO: 30.4 PG (ref 27–31.3)
MCHC RBC AUTO-ENTMCNC: 33.4 % (ref 33–37)
MCV RBC AUTO: 91.2 FL (ref 79–92.2)
PDW BLD-RTO: 15.3 % (ref 11.5–14.5)
PLATELET # BLD: 197 K/UL (ref 130–400)
POTASSIUM SERPL-SCNC: 4.5 MEQ/L (ref 3.4–4.9)
PROTHROMBIN TIME: 23 SEC (ref 12.3–14.9)
RBC # BLD: 3.99 M/UL (ref 4.7–6.1)
SODIUM BLD-SCNC: 142 MEQ/L (ref 135–144)
WBC # BLD: 7 K/UL (ref 4.8–10.8)

## 2023-01-03 PROCEDURE — 85730 THROMBOPLASTIN TIME PARTIAL: CPT

## 2023-01-03 PROCEDURE — 80048 BASIC METABOLIC PNL TOTAL CA: CPT

## 2023-01-03 PROCEDURE — 85610 PROTHROMBIN TIME: CPT

## 2023-01-03 PROCEDURE — 85027 COMPLETE CBC AUTOMATED: CPT

## 2023-01-03 RX ORDER — SODIUM CHLORIDE, SODIUM LACTATE, POTASSIUM CHLORIDE, CALCIUM CHLORIDE 600; 310; 30; 20 MG/100ML; MG/100ML; MG/100ML; MG/100ML
INJECTION, SOLUTION INTRAVENOUS CONTINUOUS
OUTPATIENT
Start: 2023-01-10

## 2023-01-03 RX ORDER — METOPROLOL TARTRATE 50 MG/1
25 TABLET, FILM COATED ORAL 2 TIMES DAILY
COMMUNITY

## 2023-01-03 RX ORDER — SODIUM CHLORIDE 0.9 % (FLUSH) 0.9 %
5-40 SYRINGE (ML) INJECTION EVERY 12 HOURS SCHEDULED
OUTPATIENT
Start: 2023-01-03

## 2023-01-03 RX ORDER — SODIUM CHLORIDE 9 MG/ML
INJECTION, SOLUTION INTRAVENOUS PRN
OUTPATIENT
Start: 2023-01-03

## 2023-01-03 RX ORDER — LIDOCAINE HYDROCHLORIDE 10 MG/ML
1 INJECTION, SOLUTION EPIDURAL; INFILTRATION; INTRACAUDAL; PERINEURAL
OUTPATIENT
Start: 2023-01-03 | End: 2023-01-04

## 2023-01-03 RX ORDER — SODIUM CHLORIDE 0.9 % (FLUSH) 0.9 %
5-40 SYRINGE (ML) INJECTION PRN
OUTPATIENT
Start: 2023-01-03

## 2023-01-03 ASSESSMENT — ENCOUNTER SYMPTOMS
DIARRHEA: 0
CHEST TIGHTNESS: 0
EYE PAIN: 0
SINUS PAIN: 0
ABDOMINAL PAIN: 0
ALLERGIC/IMMUNOLOGIC NEGATIVE: 1
SORE THROAT: 0
RHINORRHEA: 1
SINUS PRESSURE: 0
WHEEZING: 0
BLOOD IN STOOL: 0
NAUSEA: 0
CONSTIPATION: 0
PHOTOPHOBIA: 0
SHORTNESS OF BREATH: 0
EYE REDNESS: 0
ABDOMINAL DISTENTION: 0
TROUBLE SWALLOWING: 0
COUGH: 0
VOMITING: 0
EYE ITCHING: 0
BACK PAIN: 0
EYE DISCHARGE: 0

## 2023-01-03 NOTE — H&P
Preoperative Consultation      Name: Peyton Bill  YOB: 1935  Date of Service: 1/3/2023      CHIEF COMPLAINT:  post laminectomy syndrome, lumbar region     HISTORY OF PRESENT ILLNESS:      The patient is a 82 y.o. male with significant past medical history of post laminectomy syndrome, lumbar region who presents for a preoperative consultation at the request of surgeon, Dr. Francisco Colon, who plans on performing spinal cord stimulator trial on 1/10/23 at [facility] [place]. Pt reports he started having bilateral leg pain s/p laminectomy in 2021. He reports the pain starts at his hips bilaterally and radiates down to bilateral feet. He rates is pain at 7-8/10 daily average and today says it is 7/10. He describes his pain as \"chronic, achy, throbbing\". He reports numbness and tingling in bilateral legs and feet. He reports his pain in his legs limits his walking. He presents today in a wheelchair and reports he uses a walker and cane at home. He reports multiple falls this year-he lives at home with his wife who helps take care of him. He reports his pain does not affect his bladder or bowels and denies incontinence. He reports his pain affects his ADL's and ability to sleep at night. He reports he has tried \"tons of [employer] and physical therapy but says that nothing has helped. He reports he discussed his pain s/p laminectomy with Dr. Carla Ley who he says referred him to Dr. Francisco Colon. Pt reports bilateral weakness in both legs. Pt reports he is not currently taking any medications for pain and is hopeful this procedure will work.      Hx HTN, HPL, CHF, CAD s/p 1 cardiac stent 2006, Afib, SSS, pacemaker (Medtronic/implanted 7/2022), follows with cardiology Dr. Alvarado Speaker fax for last visit notes and last pacemaker device check to be sent to us-will place in pts chart once I receive them, TIAs, polycythemia vera, mesothelioma dx 2022 (27 rounds radiation, follows with Dr. Miguelito Gonzales), sleep apnea, arthritis, former smoker 0.5 pk/day for 10 years quit 1968. Update: fax received from Dr. Vilma Garcia last visit notes 11/17/22 paper copy in chart. Last device check 10/14/22 paper copy in chart.     Planned Anesthesia: General  Known Anesthesia Problems: None  Bleeding Risk: Anticoagulant therapy- Pt takes Coumadin for afib-told to hold 5 days prior to OR/bridge to lovenox per cardiologist Martín Hirsch prior TIA  Patient Objection to Receiving Blood Products: No  Personal of FH of DVT/PE: Yes - DVT left leg 2012    Medical/Cardiac Clearance Needed: No    Past Medical History:        Diagnosis Date    A-fib ([facility] [address]     approx 1 year ago-cardioverted    Arthritis     Baker's cyst of knee, left     Cancer ([facility] [address]     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    Cerebral artery occlusion with cerebral infarction ([facility] [address] 2000    TIA sx felt funny --     Congestive heart failure ([facility] [address] 03/08/2022    Coronary artery disease     HTN (hypertension)     meds > 20 yrs    Hx of blood clots 2012    DVT left leg     Hyperlipidemia     meds > 20 yrs    Pacemaker 07/19/2022    Polycythemia     Torn meniscus     left knee     Past Surgical History:    Past Surgical History:   Procedure Laterality Date    BACK SURGERY      COLONOSCOPY  2009    COLONOSCOPY  2012    CORONARY ANGIOPLASTY WITH STENT PLACEMENT  10/2006    x 1 cardiac stent    ENDOSCOPY, COLON, DIAGNOSTIC      EYE SURGERY      [place] with IOL OU    HERNIA REPAIR  03/2013    redo ca mesh in [id number].7207 -- umbilical hernia    JOINT REPLACEMENT Bilateral 2009 & 2011    Bilateral TKR    KNEE SURGERY Left      arthroscopic surgery to repair meniscus of left knee    LAMINECTOMY N/A 02/08/2021    RIGHT BILATERAL L2-3 3-4 4-5 MICRODECOMPRESSION performed by Cisco Richter MD at [facility] 7/19/22    TONSILLECTOMY  age 8    [address]  03/2012       Allergies:    Benadryl [diphenhydramine hcl]    Medications Prior to Admission:    Current Outpatient Medications   Medication Sig Dispense Refill    metoprolol tartrate (LOPRESSOR) 50 MG tablet Take 25 mg by mouth 2 times daily      Elastic Bandages & Supports (MEDICAL COMPRESSION STOCKINGS) MISC 1 each by Does not apply route daily Thigh high 20-30 mmhg graduated compression stockings both legs wear daily during day and off Qhs. Wear as tolerated. Do not wear if they cause increased pain. 1 each 5    losartan (COZAAR) 100 MG tablet Take 100 mg by mouth in the morning. meloxicam (MOBIC) 15 MG tablet Take 15 mg by mouth in the morning. potassium chloride (KLOR-CON M) 20 MEQ extended release tablet Take 20 mEq by mouth in the morning and 20 mEq before bedtime. rosuvastatin (CRESTOR) 10 MG tablet TAKE 1 TABLET BY MOUTH EVERY DAY      furosemide (LASIX) 20 MG tablet Take by mouth      warfarin (COUMADIN) 2.5 MG tablet Take 4 mg by mouth daily Indications: T-Th-Sat - Sun Daily per INR levels      nitroGLYCERIN (NITROSTAT) 0.4 MG SL tablet Place 0.4 mg under the tongue every 5 minutes as needed for Chest pain up to max of 3 total doses. If no relief after 1 dose, call 911. Omega-3 Fatty Acids (FISH OIL) 1200 MG CAPS Take by mouth daily      Cholecalciferol (VITAMIN D3) 125 MCG (5000 UT) TABS Take by mouth daily      CPAP Machine MISC by Does not apply route      warfarin (COUMADIN) 5 MG tablet Take 5 mg by mouth daily Indications: Mphgfo-Wuvxqanlu-Ftlwnb Daily per INR levels      aspirin 81 MG EC tablet Take 81 mg by mouth daily. No current facility-administered medications for this encounter.      Facility-Administered Medications Ordered in Other Encounters   Medication Dose Route Frequency Provider Last Rate Last Admin    sodium chloride flush 0.9 % injection 10 mL  10 mL IntraVENous PRN Linda Chaves MD   10 mL at 07/10/19 1220       Social History:   Social History     Socioeconomic History    Marital status:      Spouse name: Not on file    Number of children: Not on file Years of education: Not on file    Highest education level: Not on file   Occupational History    Not on file   Tobacco Use    Smoking status: Former     Packs/day: 0.50     Years: 10.00     Pack years: 5.00     Types: Cigarettes     Quit date: 1968     Years since quittin.7    Smokeless tobacco: Never   Vaping Use    Vaping Use: Never used   Substance and Sexual Activity    Alcohol use: Yes     Comment: social    Drug use: Never    Sexual activity: Not on file   Other Topics Concern    Not on file   Social History Narrative    Not on file     Social Determinants of Health     Financial Resource Strain: Not on file   Food Insecurity: Not on file   Transportation Needs: Not on file   Physical Activity: Not on file   Stress: Not on file   Social Connections: Not on file   Intimate Partner Violence: Not on file   Housing Stability: Not on file       Family History:       Problem Relation Age of Onset    Heart Attack Mother     Other Mother          in MVA    Other Father          at age 80    Other Sister          as infant    Cancer Brother     Other Brother         unknown medical hx    Other Brother          at age 61 due to accident    Cancer Daughter         colon & lung cancer    Colon Cancer Daughter     No Known Problems Son     Colon Cancer Other     Breast Cancer Other        Review of Systems   Constitutional:  Negative for appetite change, chills, diaphoresis, fatigue, fever and unexpected weight change. HENT:  Positive for rhinorrhea (chronic). Negative for congestion, ear discharge, ear pain, hearing loss, mouth sores, nosebleeds, postnasal drip, sinus pressure, sinus pain, sneezing, sore throat, tinnitus and trouble swallowing. Eyes:  Negative for photophobia, pain, discharge, redness, itching and visual disturbance. Prescription glasses   Respiratory:  Negative for cough, chest tightness, shortness of breath and wheezing.          Hx mesothelioma dx  (27 rounds radiation, follows with Dr. Kevyn Madsen)   Cardiovascular:  Negative for chest pain, palpitations and leg swelling. Pacemaker (Medtronic/implanted 7/2022)    Hx afib, SSS   Gastrointestinal:  Negative for abdominal distention, abdominal pain, blood in stool, constipation, diarrhea, nausea and vomiting. Endocrine: Negative for cold intolerance and heat intolerance. Genitourinary:  Negative for decreased urine volume, difficulty urinating, dysuria, frequency, hematuria and urgency. Musculoskeletal:  Positive for arthralgias and gait problem. Negative for back pain, myalgias, neck pain and neck stiffness. Pt in wheelchair and reports he uses a walker and cane at home. He reports multiple falls this year-he lives at home with his wife who helps take care of him. Hx bilateral leg pain s/p laminectomy in 2021. He reports the pain starts at his hips bilaterally and radiates down to bilateral feet. Rates pain today 7/10. He describes his pain as \"chronic, achy, throbbing\". He reports numbness and tingling in bilateral legs and feet. He reports his pain in his legs limits his walking. He reports his pain affects his ADL's and ability to sleep at night. He reports he has tried \"tons of [employer] and physical therapy but says that nothing has helped. Skin:  Negative for rash and wound. Allergic/Immunologic: Negative. Neurological:  Positive for weakness (bilateral legs) and numbness (bilateral legs). Negative for dizziness, tremors, seizures, syncope, light-headedness and headaches. Hematological:  Bruises/bleeds easily. Takes coumadin for afib    Hx polycythemia vera   Psychiatric/Behavioral: Negative. Vitals:  BP (!) 110/58   Pulse 61   Temp 98.6 °F (37 °C) (Temporal)   Resp 16   Ht 5' 7\" (1.702 m)   Wt 268 lb (121.6 kg)   SpO2 98%   BMI 41.97 kg/m²       Physical Exam  Constitutional:       General: He is not in acute distress. Appearance: Normal appearance.  He is obese. He is not ill-appearing, toxic-appearing or diaphoretic. HENT:      Head: Normocephalic and atraumatic. Right Ear: External ear normal. There is impacted cerumen. Left Ear: Tympanic membrane, ear canal and external ear normal. There is no impacted cerumen. Nose: Nose normal. No congestion or rhinorrhea. Mouth/Throat:      Mouth: Mucous membranes are moist.      Pharynx: Oropharynx is clear. No oropharyngeal exudate or posterior oropharyngeal erythema. Comments: Upper and lower dentures  Eyes:      General: No scleral icterus. Right eye: No discharge. Left eye: No discharge. Extraocular Movements: Extraocular movements intact. Conjunctiva/sclera: Conjunctivae normal.      Pupils: Pupils are equal, round, and reactive to light. Neck:      Vascular: No carotid bruit. Cardiovascular:      Rate and Rhythm: Normal rate and regular rhythm. Pulses: Normal pulses. Heart sounds: Normal heart sounds. No murmur heard. Comments: Pacemaker present-right chest wall (Medtronic/placed 7/2022)  Pulmonary:      Effort: Pulmonary effort is normal. No respiratory distress. Breath sounds: Normal breath sounds. No wheezing. Abdominal:      General: Bowel sounds are normal. There is no distension. Palpations: Abdomen is soft. Tenderness: There is no abdominal tenderness. There is no guarding. Comments: Rounded abdomen     Genitourinary:     Comments: Deferred  Musculoskeletal:         General: No swelling. Normal range of motion. Cervical back: Normal range of motion. No rigidity. Right lower leg: Edema (non pitting) present. Left lower leg: Edema (non pitting) present. Comments: Pain with ROM bilateral lower extremities      Lymphadenopathy:      Cervical: No cervical adenopathy. Skin:     General: Skin is warm and dry. Capillary Refill: Capillary refill takes less than 2 seconds.       Coloration: Skin is not jaundiced. Findings: No bruising, erythema or rash. Comments: Lower back surgical scar-healed well   Neurological:      General: No focal deficit present. Mental Status: He is alert and oriented to person, place, and time. Motor: Weakness (bilateral lower extremities) present. Gait: Gait abnormal (in wheelchair, uses cane and walker). Psychiatric:         Mood and Affect: Mood normal.         Behavior: Behavior normal.         Thought Content: Thought content normal.         Judgment: Judgment normal.       Assessment:  82 y.o. patient with   Patient Active Problem List   Diagnosis    Ventral hernia    Ventral hernia, recurrent    Polycythemia    Spinal stenosis of lumbar region with neurogenic claudication    Atherosclerosis of native artery of both lower extremities with intermittent claudication (HCC)    Atrial fibrillation (HCC)    BPH with obstruction/lower urinary tract symptoms    Congenital cystic kidney disease    Venous insufficiency    Intermittent claudication (HCC)    Transient ischemic attack    Sleep apnea    Rosacea    Fuchs' corneal dystrophy    Excessive daytime sleepiness    Hx of blood clots    Former smoker    Lumbar stenosis with neurogenic claudication    Congestive heart failure (HCC)    Lumbar spondylosis    Balance disorder    Postlaminectomy syndrome, lumbar region    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     Plan:  Preoperative workup as follows: PAT, BMP, CBC, APTT, PT/INR, EKG   2.   Scheduled for: spinal cord stimulator trial on 1/10/23    EDELMIRA Ureña - CNP  1/3/2023  12:27 PM

## 2023-01-03 NOTE — H&P (VIEW-ONLY)
Preoperative Consultation      Name: Ritesh Pérez  YOB: 1935  Date of Service: 1/3/2023      CHIEF COMPLAINT:  post laminectomy syndrome, lumbar region     HISTORY OF PRESENT ILLNESS:      The patient is a 82 y.o. male with significant past medical history of post laminectomy syndrome, lumbar region who presents for a preoperative consultation at the request of surgeon, Dr. Ildefonso Boone, who plans on performing spinal cord stimulator trial on 1/10/23 at [facility]. Pt reports he started having bilateral leg pain s/p laminectomy in 2021. He reports the pain starts at his hips bilaterally and radiates down to bilateral feet. He rates is pain at 7-8/10 daily average and today says it is 7/10. He describes his pain as \"chronic, achy, throbbing\". He reports numbness and tingling in bilateral legs and feet. He reports his pain in his legs limits his walking. He presents today in a wheelchair and reports he uses a walker and cane at home. He reports multiple falls this year-he lives at home with his wife who helps take care of him. He reports his pain does not affect his bladder or bowels and denies incontinence. He reports his pain affects his ADL's and ability to sleep at night. He reports he has tried \"tons of [employer] and physical therapy but says that nothing has helped. He reports he discussed his pain s/p laminectomy with Dr. Kathy Hatfield who he says referred him to Dr. Ildefonso Boone. Pt reports bilateral weakness in both legs. Pt reports he is not currently taking any medications for pain and is hopeful this procedure will work.      Hx HTN, HPL, CHF, CAD s/p 1 cardiac stent 2006, Afib, SSS, pacemaker (Medtronic/implanted 7/2022), follows with cardiology Dr. Prince Alejandra fax for last visit notes and last pacemaker device check to be sent to us-will place in pts chart once I receive them, TIAs, polycythemia vera, mesothelioma dx 2022 (27 rounds radiation, follows with Dr. Toy Lilly), sleep apnea, arthritis, former smoker 0.5 pk/day for 10 years quit 1968. Update: fax received from Dr. Leanne Shafer last visit notes 11/17/22 paper copy in chart. Last device check 10/14/22 paper copy in chart.     Planned Anesthesia: General  Known Anesthesia Problems: None  Bleeding Risk: Anticoagulant therapy- Pt takes Coumadin for afib-told to hold 5 days prior to OR/bridge to lovenox per cardiologist Yanet Urbano prior TIA  Patient Objection to Receiving Blood Products: No  Personal of FH of DVT/PE: Yes - DVT left leg 2012    Medical/Cardiac Clearance Needed: No    Past Medical History:        Diagnosis Date    A-fib ([facility] [address]     approx 1 year ago-cardioverted    Arthritis     Baker's cyst of knee, left     Cancer ([facility] [address]     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    Cerebral artery occlusion with cerebral infarction ([facility] [address] 2000    TIA sx felt funny --     Congestive heart failure ([facility] [address] 03/08/2022    Coronary artery disease     HTN (hypertension)     meds > 20 yrs    Hx of blood clots 2012    DVT left leg     Hyperlipidemia     meds > 20 yrs    Pacemaker 07/19/2022    Polycythemia     Torn meniscus     left knee     Past Surgical History:    Past Surgical History:   Procedure Laterality Date    BACK SURGERY      COLONOSCOPY  2009    COLONOSCOPY  2012    CORONARY ANGIOPLASTY WITH STENT PLACEMENT  10/2006    x 1 cardiac stent    ENDOSCOPY, COLON, DIAGNOSTIC      EYE SURGERY      Phaco with IOL OU    HERNIA REPAIR  03/2013    redo ca mesh in GHR.0094 -- umbilical hernia    JOINT REPLACEMENT Bilateral 2009 & 2011    Bilateral TKR    KNEE SURGERY Left      arthroscopic surgery to repair meniscus of left knee    LAMINECTOMY N/A 02/08/2021    RIGHT BILATERAL L2-3 3-4 4-5 MICRODECOMPRESSION performed by Kendall Givens MD at [facility] 7/19/22    TONSILLECTOMY  age 8    [address]  03/2012       Allergies:    Benadryl [diphenhydramine hcl]    Medications Prior to Admission:    Current Outpatient Medications   Medication Sig Dispense Refill    metoprolol tartrate (LOPRESSOR) 50 MG tablet Take 25 mg by mouth 2 times daily      Elastic Bandages & Supports (MEDICAL COMPRESSION STOCKINGS) MISC 1 each by Does not apply route daily Thigh high 20-30 mmhg graduated compression stockings both legs wear daily during day and off Qhs. Wear as tolerated. Do not wear if they cause increased pain. 1 each 5    losartan (COZAAR) 100 MG tablet Take 100 mg by mouth in the morning. meloxicam (MOBIC) 15 MG tablet Take 15 mg by mouth in the morning. potassium chloride (KLOR-CON M) 20 MEQ extended release tablet Take 20 mEq by mouth in the morning and 20 mEq before bedtime. rosuvastatin (CRESTOR) 10 MG tablet TAKE 1 TABLET BY MOUTH EVERY DAY      furosemide (LASIX) 20 MG tablet Take by mouth      warfarin (COUMADIN) 2.5 MG tablet Take 4 mg by mouth daily Indications: T-Th-Sat - Sun Daily per INR levels      nitroGLYCERIN (NITROSTAT) 0.4 MG SL tablet Place 0.4 mg under the tongue every 5 minutes as needed for Chest pain up to max of 3 total doses. If no relief after 1 dose, call 911. Omega-3 Fatty Acids (FISH OIL) 1200 MG CAPS Take by mouth daily      Cholecalciferol (VITAMIN D3) 125 MCG (5000 UT) TABS Take by mouth daily      CPAP Machine MISC by Does not apply route      warfarin (COUMADIN) 5 MG tablet Take 5 mg by mouth daily Indications: Aqknli-Dofimfxrt-Xkzvvg Daily per INR levels      aspirin 81 MG EC tablet Take 81 mg by mouth daily. No current facility-administered medications for this encounter.      Facility-Administered Medications Ordered in Other Encounters   Medication Dose Route Frequency Provider Last Rate Last Admin    sodium chloride flush 0.9 % injection 10 mL  10 mL IntraVENous PRN Shanta Quesada MD   10 mL at 07/10/19 1220       Social History:   Social History     Socioeconomic History    Marital status:      Spouse name: Not on file    Number of children: Not on file Years of education: Not on file    Highest education level: Not on file   Occupational History    Not on file   Tobacco Use    Smoking status: Former     Packs/day: 0.50     Years: 10.00     Pack years: 5.00     Types: Cigarettes     Quit date: 1968     Years since quittin.7    Smokeless tobacco: Never   Vaping Use    Vaping Use: Never used   Substance and Sexual Activity    Alcohol use: Yes     Comment: social    Drug use: Never    Sexual activity: Not on file   Other Topics Concern    Not on file   Social History Narrative    Not on file     Social Determinants of Health     Financial Resource Strain: Not on file   Food Insecurity: Not on file   Transportation Needs: Not on file   Physical Activity: Not on file   Stress: Not on file   Social Connections: Not on file   Intimate Partner Violence: Not on file   Housing Stability: Not on file       Family History:       Problem Relation Age of Onset    Heart Attack Mother     Other Mother          in MVA    Other Father          at age 80    Other Sister          as infant    Cancer Brother     Other Brother         unknown medical hx    Other Brother          at age 61 due to accident    Cancer Daughter         colon & lung cancer    Colon Cancer Daughter     No Known Problems Son     Colon Cancer Other     Breast Cancer Other        Review of Systems   Constitutional:  Negative for appetite change, chills, diaphoresis, fatigue, fever and unexpected weight change. HENT:  Positive for rhinorrhea (chronic). Negative for congestion, ear discharge, ear pain, hearing loss, mouth sores, nosebleeds, postnasal drip, sinus pressure, sinus pain, sneezing, sore throat, tinnitus and trouble swallowing. Eyes:  Negative for photophobia, pain, discharge, redness, itching and visual disturbance. Prescription glasses   Respiratory:  Negative for cough, chest tightness, shortness of breath and wheezing.          Hx mesothelioma dx  (27 rounds radiation, follows with Dr. Alexander Montesinos)   Cardiovascular:  Negative for chest pain, palpitations and leg swelling. Pacemaker (Medtronic/implanted 7/2022)    Hx afib, SSS   Gastrointestinal:  Negative for abdominal distention, abdominal pain, blood in stool, constipation, diarrhea, nausea and vomiting. Endocrine: Negative for cold intolerance and heat intolerance. Genitourinary:  Negative for decreased urine volume, difficulty urinating, dysuria, frequency, hematuria and urgency. Musculoskeletal:  Positive for arthralgias and gait problem. Negative for back pain, myalgias, neck pain and neck stiffness. Pt in wheelchair and reports he uses a walker and cane at home. He reports multiple falls this year-he lives at home with his wife who helps take care of him. Hx bilateral leg pain s/p laminectomy in 2021. He reports the pain starts at his hips bilaterally and radiates down to bilateral feet. Rates pain today 7/10. He describes his pain as \"chronic, achy, throbbing\". He reports numbness and tingling in bilateral legs and feet. He reports his pain in his legs limits his walking. He reports his pain affects his ADL's and ability to sleep at night. He reports he has tried \"tons of [employer] and physical therapy but says that nothing has helped. Skin:  Negative for rash and wound. Allergic/Immunologic: Negative. Neurological:  Positive for weakness (bilateral legs) and numbness (bilateral legs). Negative for dizziness, tremors, seizures, syncope, light-headedness and headaches. Hematological:  Bruises/bleeds easily. Takes coumadin for afib    Hx polycythemia vera   Psychiatric/Behavioral: Negative. Vitals:  BP (!) 110/58   Pulse 61   Temp 98.6 °F (37 °C) (Temporal)   Resp 16   Ht 5' 7\" (1.702 m)   Wt 268 lb (121.6 kg)   SpO2 98%   BMI 41.97 kg/m²       Physical Exam  Constitutional:       General: He is not in acute distress. Appearance: Normal appearance.  He is obese. He is not ill-appearing, toxic-appearing or diaphoretic. HENT:      Head: Normocephalic and atraumatic. Right Ear: External ear normal. There is impacted cerumen. Left Ear: Tympanic membrane, ear canal and external ear normal. There is no impacted cerumen. Nose: Nose normal. No congestion or rhinorrhea. Mouth/Throat:      Mouth: Mucous membranes are moist.      Pharynx: Oropharynx is clear. No oropharyngeal exudate or posterior oropharyngeal erythema. Comments: Upper and lower dentures  Eyes:      General: No scleral icterus. Right eye: No discharge. Left eye: No discharge. Extraocular Movements: Extraocular movements intact. Conjunctiva/sclera: Conjunctivae normal.      Pupils: Pupils are equal, round, and reactive to light. Neck:      Vascular: No carotid bruit. Cardiovascular:      Rate and Rhythm: Normal rate and regular rhythm. Pulses: Normal pulses. Heart sounds: Normal heart sounds. No murmur heard. Comments: Pacemaker present-right chest wall (Medtronic/placed 7/2022)  Pulmonary:      Effort: Pulmonary effort is normal. No respiratory distress. Breath sounds: Normal breath sounds. No wheezing. Abdominal:      General: Bowel sounds are normal. There is no distension. Palpations: Abdomen is soft. Tenderness: There is no abdominal tenderness. There is no guarding. Comments: Rounded abdomen     Genitourinary:     Comments: Deferred  Musculoskeletal:         General: No swelling. Normal range of motion. Cervical back: Normal range of motion. No rigidity. Right lower leg: Edema (non pitting) present. Left lower leg: Edema (non pitting) present. Comments: Pain with ROM bilateral lower extremities      Lymphadenopathy:      Cervical: No cervical adenopathy. Skin:     General: Skin is warm and dry. Capillary Refill: Capillary refill takes less than 2 seconds.       Coloration: Skin is not jaundiced. Findings: No bruising, erythema or rash. Comments: Lower back surgical scar-healed well   Neurological:      General: No focal deficit present. Mental Status: He is alert and oriented to person, place, and time. Motor: Weakness (bilateral lower extremities) present. Gait: Gait abnormal (in wheelchair, uses cane and walker). Psychiatric:         Mood and Affect: Mood normal.         Behavior: Behavior normal.         Thought Content: Thought content normal.         Judgment: Judgment normal.       Assessment:  82 y.o. patient with   Patient Active Problem List   Diagnosis    Ventral hernia    Ventral hernia, recurrent    Polycythemia    Spinal stenosis of lumbar region with neurogenic claudication    Atherosclerosis of native artery of both lower extremities with intermittent claudication (HCC)    Atrial fibrillation (HCC)    BPH with obstruction/lower urinary tract symptoms    Congenital cystic kidney disease    Venous insufficiency    Intermittent claudication (HCC)    Transient ischemic attack    Sleep apnea    Rosacea    Fuchs' corneal dystrophy    Excessive daytime sleepiness    Hx of blood clots    Former smoker    Lumbar stenosis with neurogenic claudication    Congestive heart failure (HCC)    Lumbar spondylosis    Balance disorder    Postlaminectomy syndrome, lumbar region    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)    Mixed hyperlipidemia    Sick sinus syndrome ([facility] [address]    Presence of cardiac pacemaker      with planned surgery as above.     Plan:  Preoperative workup as follows: PAT, BMP, CBC, APTT, PT/INR, EKG   2.   Scheduled for: spinal cord stimulator trial on 1/10/23    EDELMIRA Ko CNP  1/3/2023  12:27 PM

## 2023-01-10 ENCOUNTER — ANESTHESIA (OUTPATIENT)
Dept: OPERATING ROOM | Age: 88
End: 2023-01-10
Payer: MEDICARE

## 2023-01-10 ENCOUNTER — ANESTHESIA EVENT (OUTPATIENT)
Dept: OPERATING ROOM | Age: 88
End: 2023-01-10
Payer: MEDICARE

## 2023-01-10 ENCOUNTER — HOSPITAL ENCOUNTER (OUTPATIENT)
Age: 88
Setting detail: OUTPATIENT SURGERY
Discharge: HOME | End: 2023-01-10
Attending: PAIN MEDICINE | Admitting: PAIN MEDICINE
Payer: MEDICARE

## 2023-01-10 ENCOUNTER — APPOINTMENT (OUTPATIENT)
Dept: GENERAL RADIOLOGY | Age: 88
End: 2023-01-10
Attending: PAIN MEDICINE
Payer: MEDICARE

## 2023-01-10 VITALS
DIASTOLIC BLOOD PRESSURE: 66 MMHG | HEIGHT: 67 IN | RESPIRATION RATE: 16 BRPM | TEMPERATURE: 98 F | SYSTOLIC BLOOD PRESSURE: 131 MMHG | HEART RATE: 61 BPM | WEIGHT: 268 LBS | BODY MASS INDEX: 42.06 KG/M2 | OXYGEN SATURATION: 92 %

## 2023-01-10 DIAGNOSIS — G45.9 TRANSIENT ISCHEMIC ATTACK: ICD-10-CM

## 2023-01-10 DIAGNOSIS — Z95.0 PRESENCE OF CARDIAC PACEMAKER: Primary | ICD-10-CM

## 2023-01-10 LAB
INR BLD: 1.3
PROTHROMBIN TIME: 15.8 SEC (ref 12.3–14.9)

## 2023-01-10 PROCEDURE — 7100000010 HC PHASE II RECOVERY - FIRST 15 MIN: Performed by: PAIN MEDICINE

## 2023-01-10 PROCEDURE — C1820 GENERATOR NEURO RECHG BAT SY: HCPCS | Performed by: PAIN MEDICINE

## 2023-01-10 PROCEDURE — C1897 LEAD, NEUROSTIM TEST KIT: HCPCS | Performed by: PAIN MEDICINE

## 2023-01-10 PROCEDURE — 3600000004 HC SURGERY LEVEL 4 BASE: Performed by: PAIN MEDICINE

## 2023-01-10 PROCEDURE — 3700000001 HC ADD 15 MINUTES (ANESTHESIA): Performed by: PAIN MEDICINE

## 2023-01-10 PROCEDURE — 6360000002 HC RX W HCPCS

## 2023-01-10 PROCEDURE — 2709999900 HC NON-CHARGEABLE SUPPLY: Performed by: PAIN MEDICINE

## 2023-01-10 PROCEDURE — 3209999900 FLUORO FOR SURGICAL PROCEDURES

## 2023-01-10 PROCEDURE — 6360000002 HC RX W HCPCS: Performed by: NURSE ANESTHETIST, CERTIFIED REGISTERED

## 2023-01-10 PROCEDURE — 3700000000 HC ANESTHESIA ATTENDED CARE: Performed by: PAIN MEDICINE

## 2023-01-10 PROCEDURE — 3600000014 HC SURGERY LEVEL 4 ADDTL 15MIN: Performed by: PAIN MEDICINE

## 2023-01-10 PROCEDURE — 2500000003 HC RX 250 WO HCPCS: Performed by: PAIN MEDICINE

## 2023-01-10 PROCEDURE — 2580000003 HC RX 258

## 2023-01-10 PROCEDURE — 2500000003 HC RX 250 WO HCPCS: Performed by: NURSE ANESTHETIST, CERTIFIED REGISTERED

## 2023-01-10 PROCEDURE — 7100000011 HC PHASE II RECOVERY - ADDTL 15 MIN: Performed by: PAIN MEDICINE

## 2023-01-10 PROCEDURE — 2720000010 HC SURG SUPPLY STERILE: Performed by: PAIN MEDICINE

## 2023-01-10 PROCEDURE — 85610 PROTHROMBIN TIME: CPT

## 2023-01-10 DEVICE — KIT LD L60CM 1X8 COMP TRL SCRN FOR SACR NRV STIM VECTRIS: Type: IMPLANTABLE DEVICE | Site: BACK | Status: FUNCTIONAL

## 2023-01-10 DEVICE — IMPLANTABLE DEVICE: Type: IMPLANTABLE DEVICE | Site: BACK | Status: FUNCTIONAL

## 2023-01-10 RX ORDER — SODIUM CHLORIDE 9 MG/ML
25 INJECTION, SOLUTION INTRAVENOUS PRN
Status: CANCELLED | OUTPATIENT
Start: 2023-01-10

## 2023-01-10 RX ORDER — SODIUM CHLORIDE, SODIUM LACTATE, POTASSIUM CHLORIDE, CALCIUM CHLORIDE 600; 310; 30; 20 MG/100ML; MG/100ML; MG/100ML; MG/100ML
INJECTION, SOLUTION INTRAVENOUS CONTINUOUS
Status: DISCONTINUED | OUTPATIENT
Start: 2023-01-10 | End: 2023-01-10 | Stop reason: HOSPADM

## 2023-01-10 RX ORDER — LIDOCAINE HYDROCHLORIDE 10 MG/ML
INJECTION, SOLUTION EPIDURAL; INFILTRATION; INTRACAUDAL; PERINEURAL PRN
Status: DISCONTINUED | OUTPATIENT
Start: 2023-01-10 | End: 2023-01-10 | Stop reason: ALTCHOICE

## 2023-01-10 RX ORDER — LIDOCAINE HYDROCHLORIDE 10 MG/ML
1 INJECTION, SOLUTION EPIDURAL; INFILTRATION; INTRACAUDAL; PERINEURAL
Status: DISCONTINUED | OUTPATIENT
Start: 2023-01-10 | End: 2023-01-10 | Stop reason: HOSPADM

## 2023-01-10 RX ORDER — SODIUM CHLORIDE 0.9 % (FLUSH) 0.9 %
5-40 SYRINGE (ML) INJECTION PRN
Status: CANCELLED | OUTPATIENT
Start: 2023-01-10

## 2023-01-10 RX ORDER — DEXMEDETOMIDINE HYDROCHLORIDE 100 UG/ML
INJECTION, SOLUTION INTRAVENOUS PRN
Status: DISCONTINUED | OUTPATIENT
Start: 2023-01-10 | End: 2023-01-10 | Stop reason: SDUPTHER

## 2023-01-10 RX ORDER — ENOXAPARIN SODIUM 300 MG/3ML
INJECTION INTRAVENOUS; SUBCUTANEOUS 2 TIMES DAILY
COMMUNITY

## 2023-01-10 RX ORDER — SODIUM CHLORIDE 0.9 % (FLUSH) 0.9 %
5-40 SYRINGE (ML) INJECTION EVERY 12 HOURS SCHEDULED
Status: CANCELLED | OUTPATIENT
Start: 2023-01-10

## 2023-01-10 RX ORDER — BUPIVACAINE HYDROCHLORIDE 5 MG/ML
INJECTION, SOLUTION EPIDURAL; INTRACAUDAL PRN
Status: DISCONTINUED | OUTPATIENT
Start: 2023-01-10 | End: 2023-01-10 | Stop reason: ALTCHOICE

## 2023-01-10 RX ORDER — ENOXAPARIN SODIUM 100 MG/ML
1 INJECTION SUBCUTANEOUS 2 TIMES DAILY
Qty: 14 EACH | Refills: 0 | Status: SHIPPED | OUTPATIENT
Start: 2023-01-10

## 2023-01-10 RX ORDER — SODIUM CHLORIDE 0.9 % (FLUSH) 0.9 %
5-40 SYRINGE (ML) INJECTION EVERY 12 HOURS SCHEDULED
Status: DISCONTINUED | OUTPATIENT
Start: 2023-01-10 | End: 2023-01-10 | Stop reason: HOSPADM

## 2023-01-10 RX ORDER — MEPERIDINE HYDROCHLORIDE 25 MG/ML
12.5 INJECTION INTRAMUSCULAR; INTRAVENOUS; SUBCUTANEOUS
Status: CANCELLED | OUTPATIENT
Start: 2023-01-10 | End: 2023-01-11

## 2023-01-10 RX ORDER — ONDANSETRON 2 MG/ML
4 INJECTION INTRAMUSCULAR; INTRAVENOUS
Status: CANCELLED | OUTPATIENT
Start: 2023-01-10 | End: 2023-01-11

## 2023-01-10 RX ORDER — KETAMINE HYDROCHLORIDE 100 MG/ML
INJECTION INTRAMUSCULAR; INTRAVENOUS PRN
Status: DISCONTINUED | OUTPATIENT
Start: 2023-01-10 | End: 2023-01-10 | Stop reason: SDUPTHER

## 2023-01-10 RX ORDER — SODIUM CHLORIDE 9 MG/ML
INJECTION, SOLUTION INTRAVENOUS PRN
Status: DISCONTINUED | OUTPATIENT
Start: 2023-01-10 | End: 2023-01-10 | Stop reason: HOSPADM

## 2023-01-10 RX ORDER — MIDAZOLAM HYDROCHLORIDE 1 MG/ML
INJECTION INTRAMUSCULAR; INTRAVENOUS PRN
Status: DISCONTINUED | OUTPATIENT
Start: 2023-01-10 | End: 2023-01-10 | Stop reason: SDUPTHER

## 2023-01-10 RX ORDER — METOCLOPRAMIDE HYDROCHLORIDE 5 MG/ML
10 INJECTION INTRAMUSCULAR; INTRAVENOUS
Status: CANCELLED | OUTPATIENT
Start: 2023-01-10 | End: 2023-01-11

## 2023-01-10 RX ORDER — SODIUM CHLORIDE 0.9 % (FLUSH) 0.9 %
5-40 SYRINGE (ML) INJECTION PRN
Status: DISCONTINUED | OUTPATIENT
Start: 2023-01-10 | End: 2023-01-10 | Stop reason: HOSPADM

## 2023-01-10 RX ORDER — FENTANYL CITRATE 50 UG/ML
50 INJECTION, SOLUTION INTRAMUSCULAR; INTRAVENOUS EVERY 10 MIN PRN
Status: CANCELLED | OUTPATIENT
Start: 2023-01-10

## 2023-01-10 RX ADMIN — DEXMEDETOMIDINE HCL 20 MCG: 100 INJECTION INTRAVENOUS at 08:03

## 2023-01-10 RX ADMIN — MIDAZOLAM HYDROCHLORIDE 2 MG: 1 INJECTION, SOLUTION INTRAMUSCULAR; INTRAVENOUS at 08:00

## 2023-01-10 RX ADMIN — KETAMINE HYDROCHLORIDE 20 MG: 100 INJECTION INTRAMUSCULAR; INTRAVENOUS at 08:19

## 2023-01-10 RX ADMIN — SODIUM CHLORIDE, POTASSIUM CHLORIDE, SODIUM LACTATE AND CALCIUM CHLORIDE: 600; 310; 30; 20 INJECTION, SOLUTION INTRAVENOUS at 06:54

## 2023-01-10 RX ADMIN — CEFAZOLIN 3000 MG: 10 INJECTION, POWDER, FOR SOLUTION INTRAVENOUS at 08:05

## 2023-01-10 ASSESSMENT — PAIN DESCRIPTION - ORIENTATION: ORIENTATION: RIGHT;LEFT

## 2023-01-10 ASSESSMENT — PAIN SCALES - GENERAL: PAINLEVEL_OUTOF10: 6

## 2023-01-10 ASSESSMENT — PAIN DESCRIPTION - LOCATION: LOCATION: LEG

## 2023-01-10 ASSESSMENT — PAIN DESCRIPTION - DESCRIPTORS: DESCRIPTORS: SHARP

## 2023-01-10 NOTE — ANESTHESIA POSTPROCEDURE EVALUATION
Department of Anesthesiology  Postprocedure Note    Patient: Tyree Jordan  MRN: [id number]  YOB: 1935  Date of evaluation: 1/10/2023      Procedure Summary     Date: 01/10/23 Room / Location: [address] [place] [address]    Anesthesia Start: 3401 Anesthesia Stop: 7222    Procedure: SPINAL CORD STIMULATOR TRIAL (Back) Diagnosis:       Lumbar postlaminectomy syndrome      (LUMBAR POST-LAMINECTOMY SYNDROME)    Surgeons: Satish Burciaga MD Responsible Provider: Italia Fontaine MD    Anesthesia Type: general ASA Status: 4          Anesthesia Type: No value filed.     Shae Phase I: Shae Score: 10    Shae Phase II:        Anesthesia Post Evaluation    Patient location during evaluation: bedside  Patient participation: complete - patient participated  Level of consciousness: awake and awake and alert  Airway patency: patent  Nausea & Vomiting: no nausea and no vomiting  Complications: no  Cardiovascular status: blood pressure returned to baseline and hemodynamically stable  Respiratory status: acceptable  Hydration status: euvolemic

## 2023-01-10 NOTE — ANESTHESIA PRE PROCEDURE
Department of Anesthesiology  Preprocedure Note       Name:  Charla Santizo   Age:  82 y.o.  :  1935                                          MRN:  [id number]         Date:  1/10/2023      Surgeon: Iveth Burns):  Jenelle Groves MD    Procedure: Procedure(s):  SPINAL CORD STIMULATOR TRIAL. 1 HOUR, 1 C-ARM, [facility] - [place]. **STAT INR MORNING OF SURGERY**    Medications prior to admission:   Prior to Admission medications    Medication Sig Start Date End Date Taking? Authorizing Provider   enoxaparin (LOVENOX) 300 MG/3ML injection Inject into the skin 2 times daily   Yes Historical Provider, MD   metoprolol tartrate (LOPRESSOR) 50 MG tablet Take 25 mg by mouth 2 times daily    Historical Provider, MD   Elastic Bandages & Supports ([facility]) [facility] 1 each by Does not apply route daily Thigh high 20-30 mmhg graduated compression stockings both legs wear daily during day and off Qhs. Wear as tolerated. Do not wear if they cause increased pain. 22   John Macias, APRN - CNP   losartan (COZAAR) 100 MG tablet Take 100 mg by mouth in the morning. Historical Provider, MD   meloxicam (MOBIC) 15 MG tablet Take 15 mg by mouth in the morning. Historical Provider, MD   potassium chloride (KLOR-CON M) 20 MEQ extended release tablet Take 20 mEq by mouth in the morning and 20 mEq before bedtime. Historical Provider, MD   rosuvastatin (CRESTOR) 10 MG tablet TAKE 1 TABLET BY MOUTH EVERY DAY 22   Historical Provider, MD   furosemide (LASIX) 20 MG tablet Take by mouth    Historical Provider, MD   warfarin (COUMADIN) 2.5 MG tablet Take 4 mg by mouth daily Indications: T-Th-Sat - Sun Daily per INR levels    Historical Provider, MD   nitroGLYCERIN (NITROSTAT) 0.4 MG SL tablet Place 0.4 mg under the tongue every 5 minutes as needed for Chest pain up to max of 3 total doses. If no relief after 1 dose, call 911.     Historical Provider, MD   Omega-3 Fatty Acids (FISH OIL) 1200 MG CAPS Take by mouth daily    Historical Provider, MD   Cholecalciferol (VITAMIN D3) 125 MCG (5000 UT) TABS Take by mouth daily    Historical Provider, MD   CPAP Machine MISC by Does not apply route    Historical Provider, MD   warfarin (COUMADIN) 5 MG tablet Take 5 mg by mouth daily Indications: Fmexhm-Abbdzbups-Seigpn Daily per INR levels 2/26/13   Historical Provider, MD   aspirin 81 MG EC tablet Take 81 mg by mouth daily. Historical Provider, MD       Current medications:    Current Facility-Administered Medications   Medication Dose Route Frequency Provider Last Rate Last Admin    lactated ringers infusion   IntraVENous Continuous Schmidt Elisabet, APRN - CNP 50 mL/hr at 01/10/23 0654 New Bag at 01/10/23 0654    ceFAZolin (ANCEF) 3000 mg in dextrose 5 % 100 mL IVPB  3,000 mg IntraVENous Once Schmidt Elisabet, APRN - CNP        lidocaine PF 1 % injection 1 mL  1 mL IntraDERmal Once PRN Schmidt Elisabet, APRN - CNP        sodium chloride flush 0.9 % injection 5-40 mL  5-40 mL IntraVENous 2 times per day Schmidt Elisabet, APRN - CNP        sodium chloride flush 0.9 % injection 5-40 mL  5-40 mL IntraVENous PRN Schmidt Elisabet, APRN - CNP        0.9 % sodium chloride infusion   IntraVENous PRN Schmidt Elisabet, APRN - CNP         Facility-Administered Medications Ordered in Other Encounters   Medication Dose Route Frequency Provider Last Rate Last Admin    sodium chloride flush 0.9 % injection 10 mL  10 mL IntraVENous PRN Mekhi Moffett MD   10 mL at 07/10/19 1220       Allergies:     Allergies   Allergen Reactions    Benadryl [Diphenhydramine Hcl] Anxiety       Problem List:    Patient Active Problem List   Diagnosis Code    Ventral hernia K43.9    Ventral hernia, recurrent K43.2    Polycythemia D75.1    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    Atherosclerosis of native artery of both lower extremities with intermittent claudication (HCC) I70.213    Atrial fibrillation (HCC) I48.91    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.1, N13.8    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    Venous insufficiency I87.2    Intermittent claudication (HCC) I73.9    Transient ischemic attack G45.9    Sleep apnea G47.30    Rosacea L71.9    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    Excessive daytime sleepiness G47.19    Hx of blood clots Z86.718    Former smoker Z87.891    Lumbar stenosis with neurogenic claudication M48.062    Congestive heart failure (HCC) I50.9    Lumbar spondylosis M47.816    Balance disorder R26.89    Postlaminectomy syndrome, lumbar region M96.1    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.9    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[facility] [address] C45.9    Mixed hyperlipidemia E78.2    Sick sinus syndrome (HCC) I49.5    Presence of cardiac pacemaker Z95.0       Past Medical History:        Diagnosis Date    A-fib ([address]     approx 1 year ago-cardioverted    Arthritis     Baker's cyst of knee, left     Cancer ([facility] [address]     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    Cerebral artery occlusion with cerebral infarction ([address] 2000    TIA sx felt funny --     Congestive heart failure ([address] 03/08/2022    Coronary artery disease     HTN (hypertension)     meds > 20 yrs    Hx of blood clots 2012    DVT left leg     Hyperlipidemia     meds > 20 yrs    Pacemaker 07/19/2022    Polycythemia     Torn meniscus     left knee       Past Surgical History:        Procedure Laterality Date    BACK SURGERY      COLONOSCOPY  2009    COLONOSCOPY  2012    CORONARY ANGIOPLASTY WITH STENT PLACEMENT  10/2006    x 1 cardiac stent    ENDOSCOPY, COLON, DIAGNOSTIC      EYE SURGERY      [place] with IOL OU    HERNIA REPAIR  03/2013    redo ca mesh in [id number] -- umbilical hernia    JOINT REPLACEMENT Bilateral 2009 & 2011    Bilateral TKR    KNEE SURGERY Left      arthroscopic surgery to repair meniscus of left knee    LAMINECTOMY N/A 02/08/2021    RIGHT BILATERAL L2-3 3-4 4-5 MICRODECOMPRESSION performed by Rosario Cowart MD at [facility] 55 implanted 22    TONSILLECTOMY  age 8   [address]  2012       Social History:    Social History     Tobacco Use    Smoking status: Former     Packs/day: 0.50     Years: 10.00     Pack years: 5.00     Types: Cigarettes     Quit date: 1968     Years since quittin.7    Smokeless tobacco: Never   Substance Use Topics    Alcohol use: Yes     Comment: social                                Counseling given: Not Answered      Vital Signs (Current):   Vitals:    01/10/23 0600   BP: (!) 142/76   Pulse: 71   Resp: 18   Temp: 98.3 °F (36.8 °C)   TempSrc: Temporal   SpO2: 93%   Weight: 268 lb (121.6 kg)   Height: 5' 7\" (1.702 m)                                              BP Readings from Last 3 Encounters:   01/10/23 (!) 142/76   23 (!) 110/58   22 122/74       NPO Status: Time of last liquid consumption:                         Time of last solid consumption:                         Date of last liquid consumption: 23                        Date of last solid food consumption: 23    BMI:   Wt Readings from Last 3 Encounters:   01/10/23 268 lb (121.6 kg)   23 268 lb (121.6 kg)   22 255 lb (115.7 kg)     Body mass index is 41.97 kg/m².     CBC:   Lab Results   Component Value Date/Time    WBC 7.0 2023 11:57 AM    RBC 3.99 2023 11:57 AM    HGB 12.1 2023 11:57 AM    HCT 36.4 2023 11:57 AM    MCV 91.2 2023 11:57 AM    RDW 15.3 2023 11:57 AM     2023 11:57 AM       CMP:   Lab Results   Component Value Date/Time     2023 11:58 AM    K 4.5 2023 11:58 AM     2023 11:58 AM    CO2 23 2023 11:58 AM    BUN 25 2023 11:58 AM    CREATININE 1.50 2023 11:58 AM    GFRAA >60.0 2021 03:50 PM    LABGLOM 45.2 2023 11:58 AM    GLUCOSE 104 2023 11:58 AM    GLUCOSE 85 2012 01:30 PM PROT 6.9 05/02/2019 06:01 PM    CALCIUM 9.4 01/03/2023 11:58 AM    BILITOT 0.4 05/02/2019 06:01 PM    ALKPHOS 56 05/02/2019 06:01 PM    AST 23 05/02/2019 06:01 PM    ALT 23 05/02/2019 06:01 PM       POC Tests: No results for input(s): POCGLU, POCNA, POCK, POCCL, POCBUN, POCHEMO, POCHCT in the last 72 hours. Coags:   Lab Results   Component Value Date/Time    PROTIME 23.0 01/03/2023 12:08 PM    INR 2.0 01/03/2023 12:08 PM    APTT 41.9 01/03/2023 12:08 PM       HCG (If Applicable): No results found for: PREGTESTUR, PREGSERUM, HCG, HCGQUANT     ABGs: No results found for: PHART, PO2ART, JFQ3OMT, NLH6RTR, BEART, D8IYRQSW     Type & Screen (If Applicable):  No results found for: LABABO, LABRH    Drug/Infectious Status (If Applicable):  No results found for: HIV, HEPCAB    COVID-19 Screening (If Applicable):   Lab Results   Component Value Date/Time    COVID19 Not Detected 02/02/2021 06:41 PM           Anesthesia Evaluation  Patient summary reviewed and Nursing notes reviewed no history of anesthetic complications:   Airway: Mallampati: II  TM distance: >3 FB   Neck ROM: full  Mouth opening: > = 3 FB   Dental: normal exam         Pulmonary:normal exam    (+) sleep apnea:                             Cardiovascular:    (+) hypertension:, pacemaker:, CAD:, CHF:,       ECG reviewed               Beta Blocker:  Dose within 24 Hrs         Neuro/Psych:   (+) CVA:, TIA,             GI/Hepatic/Renal:   (+) morbid obesity          Endo/Other: Negative Endo/Other ROS                    Abdominal:   (+) obese,           Vascular: negative vascular ROS. Other Findings:           Anesthesia Plan      general     ASA 4       Induction: intravenous. MIPS: Prophylactic antiemetics administered. Anesthetic plan and risks discussed with patient. Plan discussed with CRNA.     Attending anesthesiologist reviewed and agrees with Preprocedure content                Michael Chávez MD   1/10/2023

## 2023-01-10 NOTE — DISCHARGE INSTRUCTIONS
Continue lovenox for 1 week, Start after going home. follow up out pt in 1 week, call for any headach, or bleeding, Diet and activity as before.

## 2023-01-10 NOTE — OP NOTE
Operative Note      Patient: Mona Matias  YOB: 1935  MRN: [id number]    Date of Procedure: 1/10/2023    Pre-Op Diagnosis: LUMBAR POST-LAMINECTOMY SYNDROME    Post-Op Diagnosis: Same       Procedure(s):  SPINAL CORD STIMULATOR TRIAL    Surgeon(s):  Dustin Champagne MD    Assistant:   First Assistant: Marivel Harrell    Anesthesia: General    Estimated Blood Loss (mL): Minimal    Complications: None    Specimens:   * No specimens in log *    Implants:  Implant Name Type Inv. Item Serial No.  Lot No. LRB No. Used Action   KIT LD L60CM 1X8 COMP TRL SCRN FOR SACR NRV STIM VECTRIS - [id number]  KIT LD L60CM 1X8 COMP TRL SCRN FOR [place] [employer] [id number] N/A 1 Implanted   DEVICE NEUROSTIMULATOR [id number] - [id number]  DEVICE NEUROSTIMULATOR [id number] [employer]  N/A 1 Implanted         Drains: * No LDAs found *    Findings: None. Detailed Description of Procedure:   Pt and wife explained of procedure, discussed complications and adverse events, Questions and concerns addressed, Taken to OR placed in Prone pojistion, skin prepped with Chloraprep. Skin marked with Fluro, anesthetised with about 5cc 1% Lidocaine. 16G [place] entered easily at L1-2 Interlaminar Epidural space, confirmed by ELLEN and Fluroscopy. An 8 lead medtronic Electrode advanced through needle to Upper part of T8 vertebrae with good coverage. Needle withdrawan with lead in place, Lead connected to external stimulator. Taken to PACU and taught to use the stimulator and discharged home in stable condition.     Electronically signed by Dustin Champagne MD on 1/10/2023 at 8:38 AM

## 2023-01-10 NOTE — INTERVAL H&P NOTE
Update History & Physical    The patient's History and Physical of November 10, history was reviewed with the patient and I examined the patient. There was no change. The surgical site was confirmed by the patient and me.     Plan: The risks, benefits, expected outcome, and alternative to the recommended procedure have been discussed with the patient. Patient understands and wants to proceed with the procedure.     Electronically signed by Jeanmarie Bonilla MD on 1/10/2023 at 7:57 AM

## 2023-01-11 ENCOUNTER — TELEPHONE (OUTPATIENT)
Dept: PAIN MANAGEMENT | Age: 88
End: 2023-01-11

## 2023-01-11 NOTE — TELEPHONE ENCOUNTER
Pt's wife spoke with Dr. Herbert Hinojosa last night and was instructed to call back in the morning to discuss dosage of medication.     Please call her back [phone]

## 2023-01-17 ENCOUNTER — OFFICE VISIT (OUTPATIENT)
Dept: PAIN MANAGEMENT | Age: 88
End: 2023-01-17

## 2023-01-17 VITALS
BODY MASS INDEX: 40.62 KG/M2 | HEIGHT: 68 IN | SYSTOLIC BLOOD PRESSURE: 132 MMHG | DIASTOLIC BLOOD PRESSURE: 82 MMHG | WEIGHT: 268 LBS | TEMPERATURE: 97.6 F

## 2023-01-17 DIAGNOSIS — M96.1 LUMBAR POST-LAMINECTOMY SYNDROME: Primary | ICD-10-CM

## 2023-01-17 NOTE — PROGRESS NOTES
History of Present Illness     Patient Identification  Corinne Hammans is a 82 y.o. male. Patient information was obtained from patient. Chief Complaint   Chief Complaint   Patient presents with    Back Pain    Leg Pain     Bilateral        Patient presents with complaint of bilateral calf. This is a result of no known injury. Vascular studies WNL,Onset of pain was 1 year ago after a laminotomy and has been stable since. described as Throbbing and rated as moderate, to both feet. Symptoms include weakness both legs. . The patient denies incontinence, dysuria. The patient denies other injuries. Stopped Lyrica which made him Goffy Had an EMG that showed Neuropathy, following with Vascular surgeon was told no circulation issues, Continues to have pain both calves. Past Medical History:   Diagnosis Date    A-fib ([facility] [address]     approx 1 year ago-cardioverted    Arthritis     Baker's cyst of knee, left     Cerebral artery occlusion with cerebral infarction ([facility] [address]     TIA sx felt funny --     Congestive heart failure ([facility] [address] 2022    Coronary artery disease     HTN (hypertension)     meds > 20 yrs    Hx of blood clots     DVT left leg     Hyperlipidemia     meds > 20 yrs    Pacemaker 2022    Polycythemia     Torn meniscus     left knee     Family History   Problem Relation Age of Onset    Heart Attack Mother     Other Mother          in MVA    Colon Cancer Other     Cancer Daughter         colon & lung cancer    Colon Cancer Daughter     Breast Cancer Other     Other Father          at age 80    Other Sister          as infant    Other Brother         unknown medical hx    No Known Problems Son     Other Brother          at age [age] [facility] due to accident     Current Outpatient Medications   Medication Sig Dispense Refill    losartan (COZAAR) 100 MG tablet Take 100 mg by mouth in the morning.      gabapentin (NEURONTIN) 400 MG capsule Take 400 mg by mouth nightly.       meloxicam (MOBIC) 15 MG tablet Take 15 mg by mouth in the morning. potassium chloride (KLOR-CON M) 20 MEQ extended release tablet Take 20 mEq by mouth in the morning and 20 mEq before bedtime. rosuvastatin (CRESTOR) 10 MG tablet TAKE 1 TABLET BY MOUTH EVERY DAY      nitroGLYCERIN (NITROSTAT) 0.4 MG SL tablet Place 0.4 mg under the tongue every 5 minutes as needed for Chest pain up to max of 3 total doses. If no relief after 1 dose, call 911. Omega-3 Fatty Acids (FISH OIL) 1200 MG CAPS Take by mouth daily      Cholecalciferol (VITAMIN D3) 125 MCG (5000 UT) TABS Take by mouth daily      furosemide (LASIX) 20 MG tablet Take 20 mg by mouth daily       aspirin 81 MG EC tablet Take 81 mg by mouth daily. dutasteride (AVODART) 0.5 MG capsule Take by mouth      furosemide (LASIX) 20 MG tablet Take by mouth      warfarin (COUMADIN) 2.5 MG tablet Take 4 mg by mouth daily Indications: T-Th-Sat - Sun       pravastatin (PRAVACHOL) 20 MG tablet every evening (Patient not taking: Reported on 3/8/2022)      dutasteride (AVODART) 0.5 MG capsule Take by mouth daily      CPAP Machine MISC by Does not apply route      warfarin (COUMADIN) 5 MG tablet Take 5 mg by mouth Twice a Week Indications: Brjfzu-Ugwartpyl-Lxvimb        No current facility-administered medications for this visit. Facility-Administered Medications Ordered in Other Visits   Medication Dose Route Frequency Provider Last Rate Last Admin    sodium chloride flush 0.9 % injection 10 mL  10 mL IntraVENous PRN Andrés Kumari MD   10 mL at 07/10/19 1220     Allergies   Allergen Reactions    Benadryl [Diphenhydramine Hcl] Anxiety       Review of Systems  Review of Systems   Constitutional: Negative. HENT: Negative. Eyes: Negative. Respiratory:  Positive for shortness of breath. Mesothelioma. Left Spontanious Pneumothorax, Rib Fx. Cardiovascular: Negative. Afib, 1 stent   Gastrointestinal: Negative. Endocrine: Negative.     Genitourinary: Negative. Musculoskeletal: Negative. Skin: Negative. Allergic/Immunologic: Negative. Neurological: Negative. Hematological: Negative. Psychiatric/Behavioral: Negative. All other systems reviewed and are negative. Physical Exam     /88   Pulse 88   Temp 97.8 °F (36.6 °C)   Ht 5' 8\" (1.727 m)   Wt 255 lb (115.7 kg)   SpO2 96%   BMI 38.77 kg/m²   Physical Exam  Vitals and nursing note reviewed. Constitutional:       Appearance: Normal appearance. HENT:      Head: Normocephalic. Right Ear: Ear canal normal.      Left Ear: Ear canal normal.      Nose: Nose normal.      Mouth/Throat:      Mouth: Mucous membranes are moist.   Eyes:      Extraocular Movements: Extraocular movements intact. Conjunctiva/sclera: Conjunctivae normal.      Pupils: Pupils are equal, round, and reactive to light. Cardiovascular:      Rate and Rhythm: Normal rate and regular rhythm. Pulses: Normal pulses. Heart sounds: Normal heart sounds. Pulmonary:      Effort: Pulmonary effort is normal.      Breath sounds: Normal breath sounds. Abdominal:      General: Abdomen is flat. Bowel sounds are normal.      Palpations: Abdomen is soft. Musculoskeletal:         General: Normal range of motion. Cervical back: Normal range of motion and neck supple. Comments: Good pulses bilateral, Surgical scar Seen, No lumbar tenderness, Weakness Plantar extension, Pain on SLRs both sides. Skin:     General: Skin is warm. Capillary Refill: Capillary refill takes less than 2 seconds. Neurological:      General: No focal deficit present. Mental Status: He is alert. Mental status is at baseline. He is oriented. Psychiatric:         Mood and Affect: Mood normal.         Behavior: Behavior normal.         Thought Content: Thought content normal.         Judgment: Judgment normal.         Plan     S/p Stim TRIAL. With 70% Relief with increased activity over all.  Lead d/aj intact advised to restart Coumadin  and plan on Stim placement.

## 2023-02-01 ENCOUNTER — PREP FOR PROCEDURE (OUTPATIENT)
Dept: NEUROSURGERY | Age: 88
End: 2023-02-01

## 2023-02-01 DIAGNOSIS — M48.062 SPINAL STENOSIS OF LUMBAR REGION WITH NEUROGENIC CLAUDICATION: Primary | ICD-10-CM

## 2023-02-06 RX ORDER — SODIUM CHLORIDE 9 MG/ML
INJECTION, SOLUTION INTRAVENOUS PRN
Status: CANCELLED | OUTPATIENT
Start: 2023-02-14

## 2023-02-06 RX ORDER — SODIUM CHLORIDE 0.9 % (FLUSH) 0.9 %
5-40 SYRINGE (ML) INJECTION EVERY 12 HOURS SCHEDULED
Status: CANCELLED | OUTPATIENT
Start: 2023-02-14

## 2023-02-06 RX ORDER — SODIUM CHLORIDE 0.9 % (FLUSH) 0.9 %
5-40 SYRINGE (ML) INJECTION PRN
Status: CANCELLED | OUTPATIENT
Start: 2023-02-14

## 2023-02-06 RX ORDER — ACETAMINOPHEN 500 MG
1000 TABLET ORAL ONCE
Status: CANCELLED | OUTPATIENT
Start: 2023-02-14 | End: 2023-02-06

## 2023-02-06 RX ORDER — CEFAZOLIN SODIUM IN 0.9 % NACL 2 G/100 ML
2000 PLASTIC BAG, INJECTION (ML) INTRAVENOUS
Status: CANCELLED | OUTPATIENT
Start: 2023-02-14 | End: 2023-02-14

## 2023-02-09 ENCOUNTER — HOSPITAL ENCOUNTER (OUTPATIENT)
Dept: PREADMISSION TESTING | Age: 88
Discharge: HOME | End: 2023-02-13
Payer: MEDICARE

## 2023-02-09 VITALS
HEIGHT: 67 IN | SYSTOLIC BLOOD PRESSURE: 122 MMHG | TEMPERATURE: 98.1 F | WEIGHT: 264 LBS | DIASTOLIC BLOOD PRESSURE: 68 MMHG | OXYGEN SATURATION: 96 % | BODY MASS INDEX: 41.44 KG/M2 | RESPIRATION RATE: 18 BRPM | HEART RATE: 67 BPM

## 2023-02-09 LAB
ANION GAP SERPL CALCULATED.3IONS-SCNC: 9 MEQ/L (ref 9–15)
APTT: 67.6 SEC (ref 24.4–36.8)
BUN BLDV-MCNC: 37 MG/DL (ref 8–23)
CALCIUM SERPL-MCNC: 9.2 MG/DL (ref 8.5–9.9)
CHLORIDE BLD-SCNC: 104 MEQ/L (ref 95–107)
CO2: 25 MEQ/L (ref 20–31)
CREAT SERPL-MCNC: 1.51 MG/DL (ref 0.7–1.2)
GFR SERPL CREATININE-BSD FRML MDRD: 44.8 ML/MIN/{1.73_M2}
GLUCOSE BLD-MCNC: 89 MG/DL (ref 70–99)
HCT VFR BLD CALC: 39 % (ref 42–52)
HEMOGLOBIN: 12.8 G/DL (ref 14–18)
INR BLD: 3.6
MCH RBC QN AUTO: 29.7 PG (ref 27–31.3)
MCHC RBC AUTO-ENTMCNC: 32.8 % (ref 33–37)
MCV RBC AUTO: 90.6 FL (ref 79–92.2)
PDW BLD-RTO: 15.1 % (ref 11.5–14.5)
PLATELET # BLD: 217 K/UL (ref 130–400)
POTASSIUM SERPL-SCNC: 5.1 MEQ/L (ref 3.4–4.9)
PROTHROMBIN TIME: 36.3 SEC (ref 12.3–14.9)
RBC # BLD: 4.31 M/UL (ref 4.7–6.1)
SODIUM BLD-SCNC: 138 MEQ/L (ref 135–144)
WBC # BLD: 7.5 K/UL (ref 4.8–10.8)

## 2023-02-09 PROCEDURE — 85610 PROTHROMBIN TIME: CPT

## 2023-02-09 PROCEDURE — 80048 BASIC METABOLIC PNL TOTAL CA: CPT

## 2023-02-09 PROCEDURE — 85027 COMPLETE CBC AUTOMATED: CPT

## 2023-02-09 PROCEDURE — 85730 THROMBOPLASTIN TIME PARTIAL: CPT

## 2023-02-09 RX ORDER — ENOXAPARIN SODIUM 100 MG/ML
INJECTION SUBCUTANEOUS 2 TIMES DAILY
Status: ON HOLD | COMMUNITY

## 2023-02-09 ASSESSMENT — ENCOUNTER SYMPTOMS
EYE REDNESS: 0
VOMITING: 0
ABDOMINAL PAIN: 0
SINUS PAIN: 0
BACK PAIN: 1
BLOOD IN STOOL: 0
DIARRHEA: 0
SHORTNESS OF BREATH: 0
TROUBLE SWALLOWING: 0
ALLERGIC/IMMUNOLOGIC NEGATIVE: 1
EYE ITCHING: 0
CONSTIPATION: 0
EYE DISCHARGE: 0
SORE THROAT: 0
CHEST TIGHTNESS: 0
WHEEZING: 0
RHINORRHEA: 0
SINUS PRESSURE: 0
NAUSEA: 0
PHOTOPHOBIA: 0
COUGH: 0
EYE PAIN: 0
ABDOMINAL DISTENTION: 0

## 2023-02-09 NOTE — H&P
Preoperative Consultation      Name: Erum Stratton  YOB: 1935  Date of Service: 2/9/2023  PCP: Dante Mendoza DO    CHIEF COMPLAINT:  post laminectomy syndrome, lumbar region    HISTORY OF PRESENT ILLNESS:      The patient is a 82 y.o. male with significant past medical history of post laminectomy syndrome lumbar region, HTN, HPL, CHF, CAD s/p 1 cardiac stent 2006, afib, SSS, pacemaker (Medtronic/implanted 7/22)-last device check 10/14/22 paper copy in chart and media tab in epic, follows with cardiology Dr. Chano Robles visit note 11/17/22 paper copy in chart and media tab in epic, TIAs, polycythemia vera, kidney disease, mesothelioma dx 2022 (27 rounds radiation follows with Dr. Opal Guzman), sleep apnea, arthritis, who presents for a preoperative consultation at the request of surgeon, Dr. Michael Padron, who plans on performing spinal cord stimulator permanent placement on 2/14/23 at [facility] [place]. Pt reports he started having bilateral leg pain s/p laminectomy in 2021. He reports the pain starts at his hips bilaterally and radiates down to bilateral feet. He rates is pain at 7-8/10 daily average and today says it is 5/10. He describes his pain as \"chronic, achy, throbbing\". He reports numbness and tingling in bilateral legs and feet. He reports his pain in his legs limits his walking. He presents today in a wheelchair and reports he uses a walker and cane at home. He reports multiple falls this year-he lives at home with his wife who helps take care of him. He reports his pain does not affect his bladder or bowels and denies incontinence. He reports his pain affects his ADL's and ability to sleep at night. He reports he has tried \"tons of [employer] and physical therapy but says that nothing has helped. He reports he discussed his pain s/p laminectomy with Dr. Husam Sandhu who he says referred him to Dr. Michael Padron. Pt reports bilateral weakness in both legs.  Pt reports he is not currently taking any medications for pain. Pt reports he had spinal cord stimulator trial 1/10/23 and it \"drastically helped my pain\". He reports it is the first time in years that he had pain relief.      Smoking hx: former smoker quit 1968 0.5pk/day for 10 years    Known Anesthesia Problems: None  Bleeding Risk: Anticoagulant therapy- Pt takes Coumadin for afib-last dose 2/7/23-bridge to lovenox per cardiologist Dr. Heather William prior TIA  Patient Objection to Receiving Blood Products: No  Personal of FH of DVT/PE: Yes - DVT left leg 2012    Past Medical History:        Diagnosis Date    A-fib ([facility]     approx 1 year ago-cardioverted    Arthritis     Baker's cyst of knee, left     Cancer ([address]     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    Cerebral artery occlusion with cerebral infarction ([facility] 2000    TIA sx felt funny --     Congestive heart failure ([facility] 03/08/2022    Coronary artery disease     HTN (hypertension)     meds > 20 yrs    Hx of blood clots 2012    DVT left leg     Hyperlipidemia     meds > 20 yrs    Pacemaker 07/19/2022    Polycythemia     Torn meniscus     left knee     Past Surgical History:    Past Surgical History:   Procedure Laterality Date    BACK SURGERY      COLONOSCOPY  2009    COLONOSCOPY  2012    CORONARY ANGIOPLASTY WITH STENT PLACEMENT  10/2006    x 1 cardiac stent    ENDOSCOPY, COLON, DIAGNOSTIC      EYE SURGERY      Phaco with IOL OU    HERNIA REPAIR  03/2013    redo ca mesh in [facility].[id number] -- umbilical hernia    JOINT REPLACEMENT Bilateral 2009 & 2011    Bilateral TKR    KNEE SURGERY Left      arthroscopic surgery to repair meniscus of left knee    LAMINECTOMY N/A 02/08/2021    RIGHT BILATERAL L2-3 3-4 4-5 MICRODECOMPRESSION performed by Nilton Simon MD at [facility] 7/19/22    SPINAL CORD STIMULATOR SURGERY N/A 1/10/2023    SPINAL CORD STIMULATOR TRIAL performed by Elvia Recinos MD at [place]  age 8    [address]  03/2012       Allergies: Benadryl [diphenhydramine hcl]    Medications Prior to Admission:    Current Outpatient Medications   Medication Sig Dispense Refill    enoxaparin (LOVENOX) 100 MG/ML Inject into the skin 2 times daily      metoprolol tartrate (LOPRESSOR) 50 MG tablet Take 25 mg by mouth 2 times daily      Elastic Bandages & Supports (MEDICAL COMPRESSION STOCKINGS) MISC 1 each by Does not apply route daily Thigh high 20-30 mmhg graduated compression stockings both legs wear daily during day and off Qhs. Wear as tolerated. Do not wear if they cause increased pain. 1 each 5    losartan (COZAAR) 100 MG tablet Take 100 mg by mouth in the morning. meloxicam (MOBIC) 15 MG tablet Take 15 mg by mouth in the morning. potassium chloride (KLOR-CON M) 20 MEQ extended release tablet Take 20 mEq by mouth in the morning and 20 mEq before bedtime. rosuvastatin (CRESTOR) 10 MG tablet TAKE 1 TABLET BY MOUTH EVERY DAY      furosemide (LASIX) 20 MG tablet Take by mouth      warfarin (COUMADIN) 2.5 MG tablet Take 4 mg by mouth daily Indications: T-Th-Sat - Sun Daily per INR levels      nitroGLYCERIN (NITROSTAT) 0.4 MG SL tablet Place 0.4 mg under the tongue every 5 minutes as needed for Chest pain up to max of 3 total doses. If no relief after 1 dose, call 911. Omega-3 Fatty Acids (FISH OIL) 1200 MG CAPS Take by mouth daily      Cholecalciferol (VITAMIN D3) 125 MCG (5000 UT) TABS Take by mouth daily      CPAP Machine MISC by Does not apply route      warfarin (COUMADIN) 5 MG tablet Take 5 mg by mouth daily Indications: Oekdeg-Mqlyhpvvx-Enwvhh Daily per INR levels      aspirin 81 MG EC tablet Take 81 mg by mouth daily. No current facility-administered medications for this encounter.      Facility-Administered Medications Ordered in Other Encounters   Medication Dose Route Frequency Provider Last Rate Last Admin    sodium chloride flush 0.9 % injection 10 mL  10 mL IntraVENous PRN Jesusita Lockett MD   10 mL at 07/10/19 1220       Social History:   Social History     Socioeconomic History    Marital status:      Spouse name: Not on file    Number of children: Not on file    Years of education: Not on file    Highest education level: Not on file   Occupational History    Not on file   Tobacco Use    Smoking status: Former     Packs/day: 0.50     Years: 10.00     Pack years: 5.00     Types: Cigarettes     Quit date: 1968     Years since quittin.8    Smokeless tobacco: Never   Vaping Use    Vaping Use: Never used   Substance and Sexual Activity    Alcohol use: Yes     Comment: social    Drug use: Never    Sexual activity: Not on file   Other Topics Concern    Not on file   Social History Narrative    Not on file     Social Determinants of Health     Financial Resource Strain: Not on file   Food Insecurity: Not on file   Transportation Needs: Not on file   Physical Activity: Not on file   Stress: Not on file   Social Connections: Not on file   Intimate Partner Violence: Not on file   Housing Stability: Not on file       Family History:       Problem Relation Age of Onset    Heart Attack Mother     Other Mother          in MVA    Other Father          at age 80    Other Sister          as infant    Cancer Brother     Other Brother         unknown medical hx    Other Brother          at age 61 due to accident    Cancer Daughter         colon & lung cancer    Colon Cancer Daughter     No Known Problems Son     Colon Cancer Other     Breast Cancer Other        Review of Systems   Constitutional:  Negative for appetite change, chills, diaphoresis, fatigue, fever and unexpected weight change. HENT:  Negative for congestion, ear discharge, ear pain, hearing loss, mouth sores, nosebleeds, postnasal drip, rhinorrhea, sinus pressure, sinus pain, sneezing, sore throat, tinnitus and trouble swallowing. Eyes:  Negative for photophobia, pain, discharge, redness, itching and visual disturbance.         Glasses Respiratory:  Negative for cough, chest tightness, shortness of breath and wheezing. Hx mesothelioma dx 2022 (27 rounds radiation, follows with Dr. Saira Obando)    Cardiovascular:  Negative for chest pain, palpitations and leg swelling. Pacemaker (Medtronic/implanted 7/2022)     Hx afib, SSS    Gastrointestinal:  Negative for abdominal distention, abdominal pain, blood in stool, constipation, diarrhea, nausea and vomiting. Genitourinary:  Negative for decreased urine volume, difficulty urinating, dysuria, frequency, hematuria and urgency. Musculoskeletal:  Positive for arthralgias, back pain and gait problem (presents in wheelchair today, uses walker at home, reports multiple falls-lives at home with wife). Negative for myalgias, neck pain and neck stiffness. Skin:  Negative for rash and wound. Allergic/Immunologic: Negative. Neurological:  Positive for weakness (bilateral legs) and numbness (bilateral legs and feet). Negative for dizziness, tremors, seizures, syncope, light-headedness and headaches. Hematological:  Bruises/bleeds easily. Takes coumadin for afib     Hx polycythemia vera    Psychiatric/Behavioral: Negative. Vitals:  /68   Pulse 67   Temp 98.1 °F (36.7 °C) (Temporal)   Resp 18   Ht 5' 7\" (1.702 m)   Wt 264 lb (119.7 kg)   SpO2 96%   BMI 41.35 kg/m²       Physical Exam  Constitutional:       General: He is not in acute distress. Appearance: Normal appearance. He is obese. He is not ill-appearing, toxic-appearing or diaphoretic. HENT:      Head: Normocephalic and atraumatic. Right Ear: Tympanic membrane, ear canal and external ear normal. There is no impacted cerumen. Left Ear: Tympanic membrane, ear canal and external ear normal. There is no impacted cerumen. Nose: Nose normal. No congestion or rhinorrhea. Mouth/Throat:      Mouth: Mucous membranes are moist.      Pharynx: Oropharynx is clear.  No oropharyngeal exudate or posterior oropharyngeal erythema. Comments: Upper and lower dentures  Eyes:      General: No scleral icterus. Right eye: No discharge. Left eye: No discharge. Extraocular Movements: Extraocular movements intact. Conjunctiva/sclera: Conjunctivae normal.      Pupils: Pupils are equal, round, and reactive to light. Cardiovascular:      Rate and Rhythm: Normal rate and regular rhythm. Pulses: Normal pulses. Heart sounds: Normal heart sounds. No murmur heard. Comments: Pacemaker present-right chest wall (Medtronic/placed 7/2022)  Pulmonary:      Effort: Pulmonary effort is normal. No respiratory distress. Breath sounds: Normal breath sounds. No wheezing. Abdominal:      General: Bowel sounds are normal. There is no distension. Palpations: Abdomen is soft. Tenderness: There is no abdominal tenderness. There is no guarding. Comments: Rounded abdomen   Genitourinary:     Comments: Deferred  Musculoskeletal:         General: No swelling. Normal range of motion. Cervical back: Normal range of motion. No rigidity. Right lower leg: Edema (non pitting) present. Left lower leg: Edema (non pitting) present. Comments: Pain with ROM bilateral lower extremities    Lymphadenopathy:      Cervical: No cervical adenopathy. Skin:     General: Skin is warm and dry. Capillary Refill: Capillary refill takes less than 2 seconds. Coloration: Skin is not jaundiced. Findings: No bruising, erythema or rash. Comments:  Lower back surgical scar-healed well    Neurological:      General: No focal deficit present. Mental Status: He is alert and oriented to person, place, and time. Motor: Weakness (bilateral lower extremities) present. Gait: Gait abnormal (in wheelchair). Psychiatric:         Mood and Affect: Mood normal.         Behavior: Behavior normal.         Thought Content:  Thought content normal.         Judgment: Judgment normal.       Assessment:  82 y.o. patient with   Patient Active Problem List   Diagnosis    Ventral hernia    Ventral hernia, recurrent    Polycythemia    Spinal stenosis of lumbar region with neurogenic claudication    Atherosclerosis of native artery of both lower extremities with intermittent claudication (HCC)    Atrial fibrillation (HCC)    BPH with obstruction/lower urinary tract symptoms    Congenital cystic kidney disease    Venous insufficiency    Intermittent claudication (HCC)    Transient ischemic attack    Sleep apnea    Rosacea    Fuchs' corneal dystrophy    Excessive daytime sleepiness    Hx of blood clots    Former smoker    Lumbar stenosis with neurogenic claudication    Congestive heart failure (HCC)    Lumbar spondylosis    Balance disorder    Postlaminectomy syndrome, lumbar region    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 Plan:  Preoperative workup as follows: PAT, CBC, BMP, PTT, PT/INR, EKG 1/3/23 in epic  2.    Scheduled for: spinal cord stimulator permanent placement on 2/14/23    Marylen Dad, APRN - CNP  2/9/2023  4:29 PM

## 2023-02-14 ENCOUNTER — ANESTHESIA EVENT (OUTPATIENT)
Dept: OPERATING ROOM | Age: 88
End: 2023-02-14
Payer: MEDICARE

## 2023-02-14 ENCOUNTER — HOSPITAL ENCOUNTER (OUTPATIENT)
Age: 88
Discharge: HOME | End: 2023-02-14
Attending: PAIN MEDICINE | Admitting: PAIN MEDICINE
Payer: MEDICARE

## 2023-02-14 ENCOUNTER — ANESTHESIA (OUTPATIENT)
Dept: OPERATING ROOM | Age: 88
End: 2023-02-14
Payer: MEDICARE

## 2023-02-14 ENCOUNTER — HOSPITAL ENCOUNTER (OUTPATIENT)
Dept: GENERAL RADIOLOGY | Age: 88
Discharge: HOME | End: 2023-02-16
Attending: PAIN MEDICINE
Payer: MEDICARE

## 2023-02-14 VITALS
HEART RATE: 73 BPM | TEMPERATURE: 97.9 F | WEIGHT: 264 LBS | RESPIRATION RATE: 20 BRPM | DIASTOLIC BLOOD PRESSURE: 74 MMHG | BODY MASS INDEX: 41.44 KG/M2 | SYSTOLIC BLOOD PRESSURE: 153 MMHG | HEIGHT: 67 IN | OXYGEN SATURATION: 92 %

## 2023-02-14 DIAGNOSIS — R52 PAIN: ICD-10-CM

## 2023-02-14 PROBLEM — M96.1 LUMBAR POST-LAMINECTOMY SYNDROME: Status: ACTIVE | Noted: 2023-02-14

## 2023-02-14 LAB
INR BLD: 1.5
PROTHROMBIN TIME: 18.1 SEC (ref 12.3–14.9)

## 2023-02-14 PROCEDURE — 85610 PROTHROMBIN TIME: CPT

## 2023-02-14 PROCEDURE — 6370000000 HC RX 637 (ALT 250 FOR IP): Performed by: PAIN MEDICINE

## 2023-02-14 PROCEDURE — 2580000003 HC RX 258: Performed by: PAIN MEDICINE

## 2023-02-14 RX ORDER — SODIUM CHLORIDE 0.9 % (FLUSH) 0.9 %
5-40 SYRINGE (ML) INJECTION PRN
Status: DISCONTINUED | OUTPATIENT
Start: 2023-02-14 | End: 2023-02-14 | Stop reason: HOSPADM

## 2023-02-14 RX ORDER — ACETAMINOPHEN 500 MG
1000 TABLET ORAL ONCE
Status: COMPLETED | OUTPATIENT
Start: 2023-02-14 | End: 2023-02-14

## 2023-02-14 RX ORDER — FENTANYL CITRATE 0.05 MG/ML
50 INJECTION, SOLUTION INTRAMUSCULAR; INTRAVENOUS EVERY 10 MIN PRN
Status: CANCELLED | OUTPATIENT
Start: 2023-02-14

## 2023-02-14 RX ORDER — SODIUM CHLORIDE 9 MG/ML
25 INJECTION, SOLUTION INTRAVENOUS PRN
Status: CANCELLED | OUTPATIENT
Start: 2023-02-14

## 2023-02-14 RX ORDER — SODIUM CHLORIDE 0.9 % (FLUSH) 0.9 %
5-40 SYRINGE (ML) INJECTION EVERY 12 HOURS SCHEDULED
Status: DISCONTINUED | OUTPATIENT
Start: 2023-02-14 | End: 2023-02-14 | Stop reason: HOSPADM

## 2023-02-14 RX ORDER — METOCLOPRAMIDE HYDROCHLORIDE 5 MG/ML
10 INJECTION INTRAMUSCULAR; INTRAVENOUS
Status: CANCELLED | OUTPATIENT
Start: 2023-02-14 | End: 2023-02-15

## 2023-02-14 RX ORDER — CEFAZOLIN SODIUM IN 0.9 % NACL 2 G/100 ML
2000 PLASTIC BAG, INJECTION (ML) INTRAVENOUS
Status: DISCONTINUED | OUTPATIENT
Start: 2023-02-14 | End: 2023-02-14 | Stop reason: HOSPADM

## 2023-02-14 RX ORDER — ONDANSETRON 2 MG/ML
4 INJECTION INTRAMUSCULAR; INTRAVENOUS
Status: CANCELLED | OUTPATIENT
Start: 2023-02-14 | End: 2023-02-15

## 2023-02-14 RX ORDER — CARBOXYMETHYLCELLULOSE SODIUM 10 MG/ML
GEL OPHTHALMIC
Status: ON HOLD | COMMUNITY

## 2023-02-14 RX ORDER — SODIUM CHLORIDE 0.9 % (FLUSH) 0.9 %
5-40 SYRINGE (ML) INJECTION PRN
Status: CANCELLED | OUTPATIENT
Start: 2023-02-14

## 2023-02-14 RX ORDER — SODIUM CHLORIDE 9 MG/ML
INJECTION, SOLUTION INTRAVENOUS PRN
Status: DISCONTINUED | OUTPATIENT
Start: 2023-02-14 | End: 2023-02-14 | Stop reason: HOSPADM

## 2023-02-14 RX ORDER — SODIUM CHLORIDE 0.9 % (FLUSH) 0.9 %
5-40 SYRINGE (ML) INJECTION EVERY 12 HOURS SCHEDULED
Status: CANCELLED | OUTPATIENT
Start: 2023-02-14

## 2023-02-14 RX ORDER — LIDOCAINE HYDROCHLORIDE 10 MG/ML
1 INJECTION, SOLUTION EPIDURAL; INFILTRATION; INTRACAUDAL; PERINEURAL
Status: DISCONTINUED | OUTPATIENT
Start: 2023-02-14 | End: 2023-02-14 | Stop reason: HOSPADM

## 2023-02-14 RX ORDER — CHOLECALCIFEROL (VITAMIN D3) 125 MCG
CAPSULE ORAL
Status: ON HOLD | COMMUNITY

## 2023-02-14 RX ORDER — OXYCODONE HYDROCHLORIDE 5 MG/1
5 TABLET ORAL
Status: CANCELLED | OUTPATIENT
Start: 2023-02-14 | End: 2023-02-15

## 2023-02-14 RX ADMIN — SODIUM CHLORIDE 1000 ML: 9 INJECTION, SOLUTION INTRAVENOUS at 08:14

## 2023-02-14 RX ADMIN — ACETAMINOPHEN 1000 MG: 500 TABLET ORAL at 08:11

## 2023-02-14 ASSESSMENT — PAIN DESCRIPTION - LOCATION: LOCATION: LEG

## 2023-02-14 ASSESSMENT — PAIN DESCRIPTION - DESCRIPTORS
DESCRIPTORS: THROBBING
DESCRIPTORS: THROBBING

## 2023-02-14 ASSESSMENT — PAIN DESCRIPTION - ORIENTATION: ORIENTATION: RIGHT;LEFT

## 2023-02-14 ASSESSMENT — PAIN - FUNCTIONAL ASSESSMENT: PAIN_FUNCTIONAL_ASSESSMENT: 0-10

## 2023-02-14 ASSESSMENT — PAIN SCALES - GENERAL: PAINLEVEL_OUTOF10: 6

## 2023-02-14 NOTE — ANESTHESIA PRE PROCEDURE
Department of Anesthesiology  Preprocedure Note       Name:  Carson Irvin   Age:  82 y.o.  :  1935                                          MRN:  [id number]         Date:  2023      Surgeon: Marie Win):  Thea Dumont MD    Procedure: Procedure(s):  SPINAL CORD STIMULATOR PERMANENT PLACEMENT   2 HOURS / 1 C-ARM / [address] / [place], PT MUST STOP HIS PLAVIX, ASA, MELOXICAM 7 DAYS PRIOR TO SURGERY. LOVENOX BRIDGE TO BE USED. Medications prior to admission:   Prior to Admission medications    Medication Sig Start Date End Date Taking? Authorizing Provider   carboxymethylcellulose (THERATEARS) 1 % ophthalmic gel Apply to eye    Historical Provider, MD   melatonin 5 MG TABS tablet Take by mouth    Historical Provider, MD   enoxaparin (LOVENOX) 100 MG/ML Inject into the skin 2 times daily    Historical Provider, MD   metoprolol tartrate (LOPRESSOR) 50 MG tablet Take 25 mg by mouth 2 times daily    Historical Provider, MD   Elastic Bandages & Supports ([address] [place] [address]) [address] [facility] 1 each by Does not apply route daily Thigh high 20-30 mmhg graduated compression stockings both legs wear daily during day and off Qhs. Wear as tolerated. Do not wear if they cause increased pain. 22   EDELMIRA Sepulveda - CNP   losartan (COZAAR) 100 MG tablet Take 100 mg by mouth in the morning. Historical Provider, MD   meloxicam (MOBIC) 15 MG tablet Take 15 mg by mouth in the morning. Historical Provider, MD   potassium chloride (KLOR-CON M) 20 MEQ extended release tablet Take 20 mEq by mouth in the morning and 20 mEq before bedtime.     Historical Provider, MD   rosuvastatin (CRESTOR) 10 MG tablet TAKE 1 TABLET BY MOUTH EVERY DAY 22   Historical Provider, MD   furosemide (LASIX) 20 MG tablet Take 20 mg by mouth daily    Historical Provider, MD   warfarin (COUMADIN) 2.5 MG tablet Take 4 mg by mouth daily Indications: T-Th-Sat - Sun Daily per INR levels    Historical Provider, MD nitroGLYCERIN (NITROSTAT) 0.4 MG SL tablet Place 0.4 mg under the tongue every 5 minutes as needed for Chest pain up to max of 3 total doses. If no relief after 1 dose, call 911. Historical Provider, MD   Omega-3 Fatty Acids (FISH OIL) 1200 MG CAPS Take by mouth daily    Historical Provider, MD   Cholecalciferol (VITAMIN D3) 125 MCG (5000 UT) TABS Take by mouth daily    Historical Provider, MD   CPAP Machine MISC by Does not apply route    Historical Provider, MD   warfarin (COUMADIN) 5 MG tablet Take 5 mg by mouth daily Indications: Feldkp-Yusfviqxs-Owctpi Daily per INR levels 2/26/13   Historical Provider, MD   aspirin 81 MG EC tablet Take 81 mg by mouth daily. Historical Provider, MD       Current medications:    Current Facility-Administered Medications   Medication Dose Route Frequency Provider Last Rate Last Admin    sodium chloride flush 0.9 % injection 5-40 mL  5-40 mL IntraVENous 2 times per day Romel Grossman MD        sodium chloride flush 0.9 % injection 5-40 mL  5-40 mL IntraVENous PRN Romel Grossman MD        0.9 % sodium chloride infusion   IntraVENous PRN Romel Grossman  mL/hr at 02/14/23 0814 1,000 mL at 02/14/23 0814    ceFAZolin (ANCEF) 2000 mg in 0.9% sodium chloride 100 mL IVPB  2,000 mg IntraVENous On Call to Tramaine Mendez MD         Facility-Administered Medications Ordered in Other Encounters   Medication Dose Route Frequency Provider Last Rate Last Admin    sodium chloride flush 0.9 % injection 10 mL  10 mL IntraVENous PRN Winsome Paulson MD   10 mL at 07/10/19 1220       Allergies:     Allergies   Allergen Reactions    Benadryl [Diphenhydramine Hcl] Anxiety    Diphenhydramine Anxiety     anxious       Problem List:    Patient Active Problem List   Diagnosis Code    Ventral hernia K43.9    Ventral hernia, recurrent K43.2    Polycythemia D75.1    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    Atherosclerosis of native artery of both lower extremities with intermittent claudication (HCC) I70.213    Atrial fibrillation (HCC) I48.91    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.1, N13.8    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    Venous insufficiency I87.2    Intermittent claudication (HCC) I73.9    Transient ischemic attack G45.9    Sleep apnea G47.30    Rosacea L71.9    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    Excessive daytime sleepiness G47.19    Hx of blood clots Z86.718    Former smoker Z87.891    Lumbar stenosis with neurogenic claudication M48.062    Congestive heart failure (HCC) I50.9    Lumbar spondylosis M47.816    Balance disorder R26.89    Postlaminectomy syndrome, lumbar region M96.1    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.9    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    Mixed hyperlipidemia E78.2    Sick sinus syndrome (HCC) I49.5    Presence of cardiac pacemaker Z95.0    Lumbar post-laminectomy syndrome M96.1       Past Medical History:        Diagnosis Date    A-fib ([address]     approx 1 year ago-cardioverted    Arthritis     Baker's cyst of knee, left     Cancer ([address]     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    Cerebral artery occlusion with cerebral infarction ([address] 2000    TIA sx felt funny --     Congestive heart failure ([address] 03/08/2022    Coronary artery disease     HTN (hypertension)     meds > 20 yrs    Hx of blood clots 2012    DVT left leg     Hyperlipidemia     meds > 20 yrs    Pacemaker 07/19/2022    Polycythemia     Torn meniscus     left knee       Past Surgical History:        Procedure Laterality Date    BACK SURGERY      COLONOSCOPY  2009    COLONOSCOPY  2012    CORONARY ANGIOPLASTY WITH STENT PLACEMENT  10/2006    x 1 cardiac stent    ENDOSCOPY, COLON, DIAGNOSTIC      EYE SURGERY      Phaco with IOL OU    HERNIA REPAIR  03/2013    redo ca mesh in [facility] -- umbilical hernia    JOINT REPLACEMENT Bilateral 2009 & 2011    Bilateral TKR    KNEE SURGERY Left      arthroscopic surgery to repair meniscus of left knee    LAMINECTOMY N/A 2021    RIGHT BILATERAL L2-3 3-4 4-5 MICRODECOMPRESSION performed by Geoffrey Sen MD at [facility] 55 implanted 22   [facility] N/A 1/10/2023    SPINAL CORD STIMULATOR TRIAL performed by Prince Truman MD at [address]  age 8   [address]  2012       Social History:    Social History     Tobacco Use    Smoking status: Former     Packs/day: 0.50     Years: 10.00     Pack years: 5.00     Types: Cigarettes     Quit date: 1968     Years since quittin.8    Smokeless tobacco: Never   Substance Use Topics    Alcohol use: Yes     Comment: social                                Counseling given: Not Answered      Vital Signs (Current):   Vitals:    23 0726   BP: (!) 153/74   Pulse: 73   Resp: 20   Temp: 97.9 °F (36.6 °C)   TempSrc: Temporal   SpO2: 92%   Weight: 264 lb (119.7 kg)   Height: 5' 7\" (1.702 m)                                              BP Readings from Last 3 Encounters:   23 (!) 153/74   23 122/68   23 132/82       NPO Status: Time of last liquid consumption:                         Time of last solid consumption:                         Date of last liquid consumption: 23                        Date of last solid food consumption: 23    BMI:   Wt Readings from Last 3 Encounters:   23 264 lb (119.7 kg)   23 264 lb (119.7 kg)   23 268 lb (121.6 kg)     Body mass index is 41.35 kg/m².     CBC:   Lab Results   Component Value Date/Time    WBC 7.5 2023 12:27 PM    RBC 4.31 2023 12:27 PM    HGB 12.8 2023 12:27 PM    HCT 39.0 2023 12:27 PM    MCV 90.6 2023 12:27 PM    RDW 15.1 2023 12:27 PM     2023 12:27 PM       CMP:   Lab Results   Component Value Date/Time     2023 12:27 PM    K 5.1 2023 12:27 PM     02/09/2023 12:27 PM    CO2 25 02/09/2023 12:27 PM    BUN 37 02/09/2023 12:27 PM    CREATININE 1.51 02/09/2023 12:27 PM    GFRAA >60.0 02/02/2021 03:50 PM    LABGLOM 44.8 02/09/2023 12:27 PM    GLUCOSE 89 02/09/2023 12:27 PM    GLUCOSE 85 04/25/2012 01:30 PM    PROT 6.9 05/02/2019 06:01 PM    CALCIUM 9.2 02/09/2023 12:27 PM    BILITOT 0.4 05/02/2019 06:01 PM    ALKPHOS 56 05/02/2019 06:01 PM    AST 23 05/02/2019 06:01 PM    ALT 23 05/02/2019 06:01 PM       POC Tests: No results for input(s): POCGLU, POCNA, POCK, POCCL, POCBUN, POCHEMO, POCHCT in the last 72 hours.     Coags:   Lab Results   Component Value Date/Time    PROTIME 36.3 02/09/2023 12:58 PM    INR 3.6 02/09/2023 12:58 PM    APTT 67.6 02/09/2023 12:58 PM       HCG (If Applicable): No results found for: PREGTESTUR, PREGSERUM, HCG, HCGQUANT     ABGs: No results found for: PHART, PO2ART, WAV4GXB, HEQ3BBY, BEART, B4TWPSQF     Type & Screen (If Applicable):  No results found for: LABABO, LABRH    Drug/Infectious Status (If Applicable):  No results found for: HIV, HEPCAB    COVID-19 Screening (If Applicable):   Lab Results   Component Value Date/Time    COVID19 Not Detected 02/02/2021 06:41 PM           Anesthesia Evaluation  Patient summary reviewed and Nursing notes reviewed no history of anesthetic complications:   Airway: Mallampati: II  TM distance: >3 FB   Neck ROM: full  Mouth opening: > = 3 FB   Dental: normal exam         Pulmonary:Negative Pulmonary ROS and normal exam    (+) sleep apnea:                             Cardiovascular:  Exercise tolerance: good (>4 METS),   (+) hypertension:, pacemaker: pacemaker, CAD:, CHF:,       ECG reviewed               Beta Blocker:  Not on Beta Blocker      ROS comment: Atrial-paced rhythm with prolonged AV conduction  Abnormal ECG  When compared with ECG of 02-FEB-2021 15:40,  Electronic atrial pacemaker has replaced Sinus rhythm     Neuro/Psych:   Negative Neuro/Psych ROS  (+) CVA:, TIA, GI/Hepatic/Renal:   (+) morbid obesity         ROS comment: BMI 41. Endo/Other: Negative Endo/Other ROS   (+) blood dyscrasia::., electrolyte abnormalities, . Pt had PAT visit. Abdominal:             Vascular: negative vascular ROS. Other Findings:           Anesthesia Plan      general     ASA 4     (ETT  Discussed risk of post operative visual disturbances, facial trauma, facial edema and pressure sores)  Induction: intravenous. MIPS: Postoperative opioids intended. Anesthetic plan and risks discussed with patient. Plan discussed with CRNA.                     Benjamin Berger DO   2/14/2023

## 2023-02-15 ENCOUNTER — ANESTHESIA EVENT (OUTPATIENT)
Dept: OPERATING ROOM | Age: 88
End: 2023-02-15
Payer: MEDICARE

## 2023-02-15 NOTE — ANESTHESIA PRE PROCEDURE
Department of Anesthesiology  Preprocedure Note       Name:  Izaiah Boateng   Age:  82 y.o.  :  1935                                          MRN:  [id number]         Date:  2023      Surgeon: Deena Zimmerman):  Romel Grossman MD    Procedure: Procedure(s):  SPINAL CORD STIMULATOR PERMANENT PLACEMENT   2 HOURS / 1 C-ARM / Viry Gaviria / Ghassan Ambrosio  / PAT COMPLETED 2023    Medications prior to admission:   Prior to Admission medications    Medication Sig Start Date End Date Taking? Authorizing Provider   carboxymethylcellulose (THERATEARS) 1 % ophthalmic gel Apply to eye    Historical Provider, MD   melatonin 5 MG TABS tablet Take by mouth    Historical Provider, MD   enoxaparin (LOVENOX) 100 MG/ML Inject into the skin 2 times daily    Historical Provider, MD   metoprolol tartrate (LOPRESSOR) 50 MG tablet Take 25 mg by mouth 2 times daily    Historical Provider, MD   Elastic Bandages & Supports ([address] [place] [address]) [address] [facility] 1 each by Does not apply route daily Thigh high 20-30 mmhg graduated compression stockings both legs wear daily during day and off Qhs. Wear as tolerated. Do not wear if they cause increased pain. 22   John Macias, APRN - CNP   losartan (COZAAR) 100 MG tablet Take 100 mg by mouth in the morning. Historical Provider, MD   meloxicam (MOBIC) 15 MG tablet Take 15 mg by mouth in the morning. Historical Provider, MD   potassium chloride (KLOR-CON M) 20 MEQ extended release tablet Take 20 mEq by mouth in the morning and 20 mEq before bedtime.     Historical Provider, MD   rosuvastatin (CRESTOR) 10 MG tablet TAKE 1 TABLET BY MOUTH EVERY DAY 22   Historical Provider, MD   furosemide (LASIX) 20 MG tablet Take 20 mg by mouth daily    Historical Provider, MD   warfarin (COUMADIN) 2.5 MG tablet Take 4 mg by mouth daily Indications: T-Th-Sat - Sun Daily per INR levels    Historical Provider, MD   nitroGLYCERIN (NITROSTAT) 0.4 MG SL tablet Place 0.4 mg under the tongue every 5 minutes as needed for Chest pain up to max of 3 total doses. If no relief after 1 dose, call 911. Historical Provider, MD   Omega-3 Fatty Acids (FISH OIL) 1200 MG CAPS Take by mouth daily    Historical Provider, MD   Cholecalciferol (VITAMIN D3) 125 MCG (5000 UT) TABS Take by mouth daily    Historical Provider, MD   CPAP Machine MISC by Does not apply route    Historical Provider, MD   warfarin (COUMADIN) 5 MG tablet Take 5 mg by mouth daily Indications: Dsxbip-Xmdutdkky-Xxqzpx Daily per INR levels 2/26/13   Historical Provider, MD   aspirin 81 MG EC tablet Take 81 mg by mouth daily. Historical Provider, MD       Current medications:    Current Facility-Administered Medications   Medication Dose Route Frequency Provider Last Rate Last Admin    lactated ringers IV soln infusion   IntraVENous Continuous Sai Goff  mL/hr at 02/16/23 0639 New Bag at 02/16/23 3579     Facility-Administered Medications Ordered in Other Encounters   Medication Dose Route Frequency Provider Last Rate Last Admin    sodium chloride flush 0.9 % injection 10 mL  10 mL IntraVENous PRN Darek Domínguez MD   10 mL at 07/10/19 1220       Allergies:     Allergies   Allergen Reactions    Benadryl [Diphenhydramine Hcl] Anxiety    Diphenhydramine Anxiety     anxious       Problem List:    Patient Active Problem List   Diagnosis Code    Ventral hernia K43.9    Ventral hernia, recurrent K43.2    Polycythemia D75.1    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    Atherosclerosis of native artery of both lower extremities with intermittent claudication (HCC) I70.213    Atrial fibrillation (HCC) I48.91    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.1, N13.8    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    Venous insufficiency I87.2    Intermittent claudication (HCC) I73.9    Transient ischemic attack G45.9    Sleep apnea G47.30    Rosacea L71.9    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   • Excessive daytime sleepiness G47.19   • Hx of blood clots Z86.718   • Former smoker Z87.891   • Lumbar stenosis with neurogenic claudication M48.062   • Congestive heart failure (HCC) I50.9   • Lumbar spondylosis M47.816   • Balance disorder R26.89   • Postlaminectomy syndrome, lumbar region M96.1   • 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.9   • 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HCC) C45.9   • Mixed hyperlipidemia E78.2   • Sick sinus syndrome (HCC) I49.5   • Presence of cardiac pacemaker Z95.0   • Lumbar post-laminectomy syndrome M96.1       Past Medical History:        Diagnosis Date   • A-fib (HCC)     approx 1 year ago-cardioverted   • Arthritis    • Baker's cyst of knee, left    • Cancer (HCC)     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   • Cerebral artery occlusion with cerebral infarction ([facility] 2000    TIA sx felt funny --    • Congestive heart failure (HCC) 03/08/2022   • Coronary artery disease    • HTN (hypertension)     meds > 20 yrs   • Hx of blood clots 2012    DVT left leg    • Hyperlipidemia     meds > 20 yrs   • Pacemaker 07/19/2022   • Polycythemia    • Torn meniscus     left knee       Past Surgical History:        Procedure Laterality Date   • BACK SURGERY     • COLONOSCOPY  2009   • COLONOSCOPY  2012   • CORONARY ANGIOPLASTY WITH STENT PLACEMENT  10/2006    x 1 cardiac stent   • ENDOSCOPY, COLON, DIAGNOSTIC     • EYE SURGERY      Phaco with IOL OU   • HERNIA REPAIR  03/2013    redo ca mesh in Oct.2013 -- umbilical hernia   • JOINT REPLACEMENT Bilateral 2009 & 2011    Bilateral TKR   • KNEE SURGERY Left      arthroscopic surgery to repair meniscus of left knee   • LAMINECTOMY N/A 02/08/2021    RIGHT BILATERAL L2-3 3-4 4-5 MICRODECOMPRESSION performed by Domi Carcamo MD at [facility] OR   • PACEMAKER PLACEMENT      medtronic implanted 7/19/22   • SPINAL CORD STIMULATOR SURGERY N/A 1/10/2023    SPINAL CORD STIMULATOR TRIAL performed by Jeanmarie Bonilla MD at [facility] OR   •  TONSILLECTOMY  age 8   [address]  2012       Social History:    Social History     Tobacco Use    Smoking status: Former     Packs/day: 0.50     Years: 10.00     Pack years: 5.00     Types: Cigarettes     Quit date: 1968     Years since quittin.8    Smokeless tobacco: Never   Substance Use Topics    Alcohol use: Yes     Comment: social                                Counseling given: Not Answered      Vital Signs (Current):   Vitals:    23 0612   BP: 112/65   Pulse: 61   Resp: 18   Temp: 98.5 °F (36.9 °C)   TempSrc: Temporal   SpO2: 95%   Weight: 264 lb (119.7 kg)   Height: 5' 7\" (1.702 m)                                              BP Readings from Last 3 Encounters:   23 112/65   23 (!) 153/74   23 122/68       NPO Status: Time of last liquid consumption:                         Time of last solid consumption:                         Date of last liquid consumption: 02/15/23                        Date of last solid food consumption: 02/15/23    BMI:   Wt Readings from Last 3 Encounters:   23 264 lb (119.7 kg)   23 264 lb (119.7 kg)   23 264 lb (119.7 kg)     Body mass index is 41.35 kg/m².     CBC:   Lab Results   Component Value Date/Time    WBC 7.5 2023 12:27 PM    RBC 4.31 2023 12:27 PM    HGB 12.8 2023 12:27 PM    HCT 39.0 2023 12:27 PM    MCV 90.6 2023 12:27 PM    RDW 15.1 2023 12:27 PM     2023 12:27 PM       CMP:   Lab Results   Component Value Date/Time     2023 06:36 AM    K 4.9 2023 06:36 AM     2023 06:36 AM    CO2 23 2023 06:36 AM    BUN 32 2023 06:36 AM    CREATININE 1.40 2023 06:36 AM    GFRAA >60.0 2021 03:50 PM    LABGLOM 49.0 2023 06:36 AM    GLUCOSE 115 2023 06:36 AM    GLUCOSE 85 2012 01:30 PM    PROT 6.9 2019 06:01 PM    CALCIUM 9.1 2023 06:36 AM    BILITOT 0.4 2019 06:01 PM ALKPHOS 56 05/02/2019 06:01 PM    AST 23 05/02/2019 06:01 PM    ALT 23 05/02/2019 06:01 PM       POC Tests: No results for input(s): POCGLU, POCNA, POCK, POCCL, POCBUN, POCHEMO, POCHCT in the last 72 hours. Coags:   Lab Results   Component Value Date/Time    PROTIME 18.1 02/14/2023 07:45 AM    INR 1.5 02/14/2023 07:45 AM    APTT 67.6 02/09/2023 12:58 PM       HCG (If Applicable): No results found for: PREGTESTUR, PREGSERUM, HCG, HCGQUANT     ABGs: No results found for: PHART, PO2ART, DBP5KBK, NHM1LVH, BEART, I9UXNABS     Type & Screen (If Applicable):  No results found for: LABABO, LABRH    Drug/Infectious Status (If Applicable):  No results found for: HIV, HEPCAB    COVID-19 Screening (If Applicable):   Lab Results   Component Value Date/Time    COVID19 Not Detected 02/02/2021 06:41 PM           Anesthesia Evaluation  Patient summary reviewed and Nursing notes reviewed no history of anesthetic complications:   Airway: Mallampati: II  TM distance: >3 FB   Neck ROM: full  Mouth opening: > = 3 FB   Dental: normal exam         Pulmonary:normal exam    (+) sleep apnea: on CPAP,                             Cardiovascular:  Exercise tolerance: good (>4 METS),   (+) hypertension:, pacemaker: pacemaker, CAD:, CABG/stent:, CHF:, hyperlipidemia      ECG reviewed               Beta Blocker:  Dose within 24 Hrs         Neuro/Psych:   Negative Neuro/Psych ROS  (+) CVA:, TIA,             GI/Hepatic/Renal: Neg GI/Hepatic/Renal ROS  (+) morbid obesity         ROS comment: BMI 41. Endo/Other: Negative Endo/Other ROS   (+) blood dyscrasia: anticoagulation therapy:., .          Pt had PAT visit. Abdominal:             Vascular: negative vascular ROS. Other Findings:           Anesthesia Plan      MAC     ASA 4       Induction: intravenous. MIPS: Postoperative opioids intended and Prophylactic antiemetics administered. Anesthetic plan and risks discussed with patient.       Plan discussed with CRNA.    Attending anesthesiologist reviewed and agrees with Pre Eval content                Alvin Aiken,    2/16/2023

## 2023-02-16 ENCOUNTER — HOSPITAL ENCOUNTER (OUTPATIENT)
Age: 88
Setting detail: OUTPATIENT SURGERY
Discharge: HOME | DRG: 186 | End: 2023-02-16
Attending: PAIN MEDICINE | Admitting: PAIN MEDICINE
Payer: MEDICARE

## 2023-02-16 ENCOUNTER — APPOINTMENT (OUTPATIENT)
Dept: GENERAL RADIOLOGY | Age: 88
DRG: 186 | End: 2023-02-16
Attending: PAIN MEDICINE
Payer: MEDICARE

## 2023-02-16 ENCOUNTER — ANESTHESIA (OUTPATIENT)
Dept: OPERATING ROOM | Age: 88
End: 2023-02-16
Payer: MEDICARE

## 2023-02-16 VITALS
HEIGHT: 67 IN | RESPIRATION RATE: 16 BRPM | OXYGEN SATURATION: 98 % | TEMPERATURE: 97 F | BODY MASS INDEX: 41.44 KG/M2 | HEART RATE: 76 BPM | SYSTOLIC BLOOD PRESSURE: 125 MMHG | DIASTOLIC BLOOD PRESSURE: 78 MMHG | WEIGHT: 264 LBS

## 2023-02-16 LAB
ANION GAP SERPL CALCULATED.3IONS-SCNC: 10 MEQ/L (ref 9–15)
BUN BLDV-MCNC: 32 MG/DL (ref 8–23)
CALCIUM SERPL-MCNC: 9.1 MG/DL (ref 8.5–9.9)
CHLORIDE BLD-SCNC: 108 MEQ/L (ref 95–107)
CO2: 23 MEQ/L (ref 20–31)
CREAT SERPL-MCNC: 1.4 MG/DL (ref 0.7–1.2)
GFR SERPL CREATININE-BSD FRML MDRD: 49 ML/MIN/{1.73_M2}
GLUCOSE BLD-MCNC: 115 MG/DL (ref 70–99)
INR BLD: 1.3
INR BLD: 1.3
POTASSIUM SERPL-SCNC: 4.9 MEQ/L (ref 3.4–4.9)
PROTHROMBIN TIME: 16.4 SEC (ref 12.3–14.9)
PROTHROMBIN TIME: 16.5 SEC (ref 12.3–14.9)
SODIUM BLD-SCNC: 141 MEQ/L (ref 135–144)

## 2023-02-16 PROCEDURE — 85610 PROTHROMBIN TIME: CPT

## 2023-02-16 PROCEDURE — 3700000001 HC ADD 15 MINUTES (ANESTHESIA): Performed by: PAIN MEDICINE

## 2023-02-16 PROCEDURE — 7100000010 HC PHASE II RECOVERY - FIRST 15 MIN: Performed by: PAIN MEDICINE

## 2023-02-16 PROCEDURE — 3700000000 HC ANESTHESIA ATTENDED CARE: Performed by: PAIN MEDICINE

## 2023-02-16 PROCEDURE — 80048 BASIC METABOLIC PNL TOTAL CA: CPT

## 2023-02-16 PROCEDURE — 6360000002 HC RX W HCPCS: Performed by: NURSE ANESTHETIST, CERTIFIED REGISTERED

## 2023-02-16 PROCEDURE — 3209999900 FLUORO FOR SURGICAL PROCEDURES

## 2023-02-16 PROCEDURE — 2500000003 HC RX 250 WO HCPCS: Performed by: PAIN MEDICINE

## 2023-02-16 PROCEDURE — 2580000003 HC RX 258: Performed by: NURSE ANESTHETIST, CERTIFIED REGISTERED

## 2023-02-16 PROCEDURE — 6360000002 HC RX W HCPCS: Performed by: PAIN MEDICINE

## 2023-02-16 PROCEDURE — 00JV3ZZ INSPECTION OF SPINAL CORD, PERCUTANEOUS APPROACH: ICD-10-PCS | Performed by: PAIN MEDICINE

## 2023-02-16 PROCEDURE — 3600000004 HC SURGERY LEVEL 4 BASE: Performed by: PAIN MEDICINE

## 2023-02-16 PROCEDURE — 7100000011 HC PHASE II RECOVERY - ADDTL 15 MIN: Performed by: PAIN MEDICINE

## 2023-02-16 PROCEDURE — 2709999900 HC NON-CHARGEABLE SUPPLY: Performed by: PAIN MEDICINE

## 2023-02-16 PROCEDURE — 2580000003 HC RX 258: Performed by: PAIN MEDICINE

## 2023-02-16 PROCEDURE — 64585 REV/RMV PERPH NSTIM ELTRD RA: CPT | Performed by: PAIN MEDICINE

## 2023-02-16 PROCEDURE — A4217 STERILE WATER/SALINE, 500 ML: HCPCS | Performed by: PAIN MEDICINE

## 2023-02-16 PROCEDURE — 3600000014 HC SURGERY LEVEL 4 ADDTL 15MIN: Performed by: PAIN MEDICINE

## 2023-02-16 RX ORDER — SODIUM CHLORIDE 0.9 % (FLUSH) 0.9 %
5-40 SYRINGE (ML) INJECTION EVERY 12 HOURS SCHEDULED
Status: CANCELLED | OUTPATIENT
Start: 2023-02-16

## 2023-02-16 RX ORDER — FENTANYL CITRATE 0.05 MG/ML
50 INJECTION, SOLUTION INTRAMUSCULAR; INTRAVENOUS EVERY 10 MIN PRN
Status: CANCELLED | OUTPATIENT
Start: 2023-02-16

## 2023-02-16 RX ORDER — MIDAZOLAM HYDROCHLORIDE 1 MG/ML
INJECTION INTRAMUSCULAR; INTRAVENOUS PRN
Status: DISCONTINUED | OUTPATIENT
Start: 2023-02-16 | End: 2023-02-16 | Stop reason: SDUPTHER

## 2023-02-16 RX ORDER — SODIUM CHLORIDE, SODIUM LACTATE, POTASSIUM CHLORIDE, CALCIUM CHLORIDE 600; 310; 30; 20 MG/100ML; MG/100ML; MG/100ML; MG/100ML
INJECTION, SOLUTION INTRAVENOUS CONTINUOUS PRN
Status: DISCONTINUED | OUTPATIENT
Start: 2023-02-16 | End: 2023-02-16 | Stop reason: SDUPTHER

## 2023-02-16 RX ORDER — SODIUM CHLORIDE 0.9 % (FLUSH) 0.9 %
5-40 SYRINGE (ML) INJECTION PRN
Status: CANCELLED | OUTPATIENT
Start: 2023-02-16

## 2023-02-16 RX ORDER — METOCLOPRAMIDE HYDROCHLORIDE 5 MG/ML
10 INJECTION INTRAMUSCULAR; INTRAVENOUS
Status: CANCELLED | OUTPATIENT
Start: 2023-02-16 | End: 2023-02-17

## 2023-02-16 RX ORDER — SODIUM CHLORIDE 9 MG/ML
25 INJECTION, SOLUTION INTRAVENOUS PRN
Status: CANCELLED | OUTPATIENT
Start: 2023-02-16

## 2023-02-16 RX ORDER — SODIUM CHLORIDE 0.9 % (FLUSH) 0.9 %
5-40 SYRINGE (ML) INJECTION PRN
Status: DISCONTINUED | OUTPATIENT
Start: 2023-02-16 | End: 2023-02-16 | Stop reason: HOSPADM

## 2023-02-16 RX ORDER — SODIUM CHLORIDE 0.9 % (FLUSH) 0.9 %
5-40 SYRINGE (ML) INJECTION EVERY 12 HOURS SCHEDULED
Status: DISCONTINUED | OUTPATIENT
Start: 2023-02-16 | End: 2023-02-16 | Stop reason: HOSPADM

## 2023-02-16 RX ORDER — MAGNESIUM HYDROXIDE 1200 MG/15ML
LIQUID ORAL CONTINUOUS PRN
Status: COMPLETED | OUTPATIENT
Start: 2023-02-16 | End: 2023-02-16

## 2023-02-16 RX ORDER — SODIUM CHLORIDE, SODIUM LACTATE, POTASSIUM CHLORIDE, CALCIUM CHLORIDE 600; 310; 30; 20 MG/100ML; MG/100ML; MG/100ML; MG/100ML
INJECTION, SOLUTION INTRAVENOUS CONTINUOUS
Status: DISCONTINUED | OUTPATIENT
Start: 2023-02-16 | End: 2023-02-16 | Stop reason: HOSPADM

## 2023-02-16 RX ORDER — LIDOCAINE HYDROCHLORIDE 10 MG/ML
INJECTION, SOLUTION EPIDURAL; INFILTRATION; INTRACAUDAL; PERINEURAL PRN
Status: DISCONTINUED | OUTPATIENT
Start: 2023-02-16 | End: 2023-02-16 | Stop reason: ALTCHOICE

## 2023-02-16 RX ORDER — CEFAZOLIN SODIUM IN 0.9 % NACL 2 G/100 ML
2000 PLASTIC BAG, INJECTION (ML) INTRAVENOUS ONCE
Status: COMPLETED | OUTPATIENT
Start: 2023-02-16 | End: 2023-02-16

## 2023-02-16 RX ORDER — ONDANSETRON 2 MG/ML
4 INJECTION INTRAMUSCULAR; INTRAVENOUS
Status: CANCELLED | OUTPATIENT
Start: 2023-02-16 | End: 2023-02-17

## 2023-02-16 RX ORDER — LIDOCAINE HYDROCHLORIDE 10 MG/ML
1 INJECTION, SOLUTION EPIDURAL; INFILTRATION; INTRACAUDAL; PERINEURAL
Status: DISCONTINUED | OUTPATIENT
Start: 2023-02-16 | End: 2023-02-16 | Stop reason: HOSPADM

## 2023-02-16 RX ORDER — OXYCODONE HYDROCHLORIDE 5 MG/1
5 TABLET ORAL
Status: CANCELLED | OUTPATIENT
Start: 2023-02-16 | End: 2023-02-17

## 2023-02-16 RX ORDER — SODIUM CHLORIDE 9 MG/ML
INJECTION, SOLUTION INTRAVENOUS PRN
Status: DISCONTINUED | OUTPATIENT
Start: 2023-02-16 | End: 2023-02-16 | Stop reason: HOSPADM

## 2023-02-16 RX ADMIN — SODIUM CHLORIDE, POTASSIUM CHLORIDE, SODIUM LACTATE AND CALCIUM CHLORIDE: 600; 310; 30; 20 INJECTION, SOLUTION INTRAVENOUS at 12:26

## 2023-02-16 RX ADMIN — Medication 2000 MG: at 12:31

## 2023-02-16 RX ADMIN — SODIUM CHLORIDE, POTASSIUM CHLORIDE, SODIUM LACTATE AND CALCIUM CHLORIDE: 600; 310; 30; 20 INJECTION, SOLUTION INTRAVENOUS at 06:39

## 2023-02-16 RX ADMIN — MIDAZOLAM HYDROCHLORIDE 2 MG: 1 INJECTION, SOLUTION INTRAMUSCULAR; INTRAVENOUS at 12:45

## 2023-02-16 ASSESSMENT — PAIN DESCRIPTION - LOCATION: LOCATION: BACK

## 2023-02-16 ASSESSMENT — PAIN DESCRIPTION - DESCRIPTORS: DESCRIPTORS: THROBBING

## 2023-02-16 ASSESSMENT — PAIN - FUNCTIONAL ASSESSMENT: PAIN_FUNCTIONAL_ASSESSMENT: 0-10

## 2023-02-16 NOTE — DISCHARGE INSTRUCTIONS
Diet activity and Meds as before. Restart Coumadin and Lovenox total 2 doses if he does not have a head ach.   Contact me if he develops head ach at [phone]

## 2023-02-16 NOTE — ANESTHESIA POSTPROCEDURE EVALUATION
Department of Anesthesiology  Postprocedure Note    Patient: Julia Pittman  MRN: [id number]  YOB: 1935  Date of evaluation: 2/16/2023      Procedure Summary     Date: 02/16/23 Room / Location: [address]    Anesthesia Start: 1226 Anesthesia Stop: 7383    Procedure: ATTEMPTED SPINAL CORD STIMULATOR PERMANENT PLACEMENT (Back) Diagnosis:       Lumbar post-laminectomy syndrome      (Lumbar post-laminectomy syndrome [M96.1])    Surgeons: Elena Llanos MD Responsible Provider: Bouchra Castaneda DO    Anesthesia Type: MAC ASA Status: 4          Anesthesia Type: No value filed.     Shae Phase I: Shae Score: 10    Shae Phase II:        Anesthesia Post Evaluation    Patient location during evaluation: bedside  Patient participation: complete - patient participated  Level of consciousness: awake and awake and alert  Airway patency: patent  Nausea & Vomiting: no nausea and no vomiting  Complications: no  Cardiovascular status: blood pressure returned to baseline and hemodynamically stable  Respiratory status: acceptable  Hydration status: euvolemic

## 2023-02-16 NOTE — OP NOTE
Operative Note      Patient: Monika Mayer  YOB: 1935  MRN: [id number]    Date of Procedure: 2/16/2023    Pre-Op Diagnosis: Lumbar post-laminectomy syndrome [M96.1]    Post-Op Diagnosis: Same       Procedure(s):  ATTEMPTED SPINAL CORD STIMULATOR PERMANENT PLACEMENT    Surgeon(s):  Lieutenant Shan MD    Assistant:   First Assistant: Eulalia Cloud    Anesthesia: General    Estimated Blood Loss (mL): Minimal    Complications: Other: CSF Tap    Specimens:   * No specimens in log *    Implants:  * No implants in log *      Drains: * No LDAs found *    Findings: None    Detailed Description of Procedure:                                                                                                           Pt Explained of the procedure, risks and complications discussed, Questions and concerns answered, Taken to OR placed in Prone pojistion, Sedated by anesthesia team, Skin prepped and drapped by Chloraprep. Skin marked for needle placement with Fluro, 16G Tuhoy advanced to L2-3 Interlaminar epidural space, confirmed with 2 views and ELLEN, Advanced 8lead electrode with Return of CSF , procedure aborted Pt Taken to Pacu allowed to Recover, Given detailed instructions on Spinal Headachs and Discharged home in stable condition.                                           Electronically signed by Lieutenant Shan MD on 2/16/2023 at 1:34 PM

## 2023-02-18 ENCOUNTER — APPOINTMENT (OUTPATIENT)
Dept: GENERAL RADIOLOGY | Age: 88
DRG: 186 | End: 2023-02-18
Attending: INTERNAL MEDICINE
Payer: MEDICARE

## 2023-02-18 ENCOUNTER — HOSPITAL ENCOUNTER (INPATIENT)
Age: 88
LOS: 7 days | Discharge: NURSING, REHAB OR LONG-TERM CARE | DRG: 186 | End: 2023-02-25
Attending: INTERNAL MEDICINE | Admitting: INTERNAL MEDICINE
Payer: MEDICARE

## 2023-02-18 DIAGNOSIS — J90 PLEURAL EFFUSION: Primary | ICD-10-CM

## 2023-02-18 LAB
ANION GAP SERPL CALCULATED.3IONS-SCNC: 12 MEQ/L (ref 9–15)
ANISOCYTOSIS: ABNORMAL
ATYPICAL LYMPHOCYTE RELATIVE PERCENT: 4 %
BASOPHILS ABSOLUTE: 0 K/UL (ref 0–0.2)
BASOPHILS RELATIVE PERCENT: 0.3 %
BUN BLDV-MCNC: 31 MG/DL (ref 8–23)
CALCIUM SERPL-MCNC: 9 MG/DL (ref 8.5–9.9)
CHLORIDE BLD-SCNC: 103 MEQ/L (ref 95–107)
CO2: 22 MEQ/L (ref 20–31)
CREAT SERPL-MCNC: 1.95 MG/DL (ref 0.7–1.2)
EOSINOPHILS ABSOLUTE: 0 K/UL (ref 0–0.7)
EOSINOPHILS RELATIVE PERCENT: 0.1 %
GFR SERPL CREATININE-BSD FRML MDRD: 33 ML/MIN/{1.73_M2}
GLUCOSE BLD-MCNC: 131 MG/DL (ref 70–99)
HCT VFR BLD CALC: 25.1 % (ref 42–52)
HEMOGLOBIN: 8.4 G/DL (ref 14–18)
HYPOCHROMIA: ABNORMAL
INR BLD: 1.4
LACTIC ACID: 1.9 MMOL/L (ref 0.5–2.2)
LYMPHOCYTES ABSOLUTE: 1 K/UL (ref 1–4.8)
LYMPHOCYTES RELATIVE PERCENT: 4 %
MACROCYTES: ABNORMAL
MCH RBC QN AUTO: 30.7 PG (ref 27–31.3)
MCHC RBC AUTO-ENTMCNC: 33.7 % (ref 33–37)
MCV RBC AUTO: 91.2 FL (ref 79–92.2)
MICROCYTES: ABNORMAL
MONOCYTES ABSOLUTE: 1.3 K/UL (ref 0.2–0.8)
MONOCYTES RELATIVE PERCENT: 10.5 %
NEUTROPHILS ABSOLUTE: 10 K/UL (ref 1.4–6.5)
NEUTROPHILS RELATIVE PERCENT: 82 %
PDW BLD-RTO: 15.7 % (ref 11.5–14.5)
PLATELET # BLD: 213 K/UL (ref 130–400)
PLATELET SLIDE REVIEW: NORMAL
POIKILOCYTES: ABNORMAL
POLYCHROMASIA: ABNORMAL
POTASSIUM SERPL-SCNC: 5.2 MEQ/L (ref 3.4–4.9)
PROTHROMBIN TIME: 17.2 SEC (ref 12.3–14.9)
RBC # BLD: 2.75 M/UL (ref 4.7–6.1)
SODIUM BLD-SCNC: 137 MEQ/L (ref 135–144)
WBC # BLD: 12.2 K/UL (ref 4.8–10.8)

## 2023-02-18 PROCEDURE — 71045 X-RAY EXAM CHEST 1 VIEW: CPT

## 2023-02-18 PROCEDURE — 74018 RADEX ABDOMEN 1 VIEW: CPT

## 2023-02-18 PROCEDURE — 94667 MNPJ CHEST WALL 1ST: CPT

## 2023-02-18 PROCEDURE — 2580000003 HC RX 258: Performed by: INTERNAL MEDICINE

## 2023-02-18 PROCEDURE — 6360000002 HC RX W HCPCS: Performed by: INTERNAL MEDICINE

## 2023-02-18 PROCEDURE — 6370000000 HC RX 637 (ALT 250 FOR IP): Performed by: INTERNAL MEDICINE

## 2023-02-18 PROCEDURE — 6370000000 HC RX 637 (ALT 250 FOR IP): Performed by: THORACIC SURGERY (CARDIOTHORACIC VASCULAR SURGERY)

## 2023-02-18 PROCEDURE — 80048 BASIC METABOLIC PNL TOTAL CA: CPT

## 2023-02-18 PROCEDURE — 99221 1ST HOSP IP/OBS SF/LOW 40: CPT | Performed by: PAIN MEDICINE

## 2023-02-18 PROCEDURE — 85610 PROTHROMBIN TIME: CPT

## 2023-02-18 PROCEDURE — 99223 1ST HOSP IP/OBS HIGH 75: CPT | Performed by: INTERNAL MEDICINE

## 2023-02-18 PROCEDURE — 94761 N-INVAS EAR/PLS OXIMETRY MLT: CPT

## 2023-02-18 PROCEDURE — 32550 INSERT PLEURAL CATH: CPT | Performed by: THORACIC SURGERY (CARDIOTHORACIC VASCULAR SURGERY)

## 2023-02-18 PROCEDURE — 85025 COMPLETE CBC W/AUTO DIFF WBC: CPT

## 2023-02-18 PROCEDURE — 94660 CPAP INITIATION&MGMT: CPT

## 2023-02-18 PROCEDURE — 94664 DEMO&/EVAL PT USE INHALER: CPT

## 2023-02-18 PROCEDURE — 0W9B30Z DRAINAGE OF LEFT PLEURAL CAVITY WITH DRAINAGE DEVICE, PERCUTANEOUS APPROACH: ICD-10-PCS | Performed by: THORACIC SURGERY (CARDIOTHORACIC VASCULAR SURGERY)

## 2023-02-18 PROCEDURE — 94150 VITAL CAPACITY TEST: CPT

## 2023-02-18 PROCEDURE — 1210000000 HC MED SURG R&B

## 2023-02-18 PROCEDURE — 83605 ASSAY OF LACTIC ACID: CPT

## 2023-02-18 PROCEDURE — 99222 1ST HOSP IP/OBS MODERATE 55: CPT | Performed by: THORACIC SURGERY (CARDIOTHORACIC VASCULAR SURGERY)

## 2023-02-18 PROCEDURE — 36415 COLL VENOUS BLD VENIPUNCTURE: CPT

## 2023-02-18 RX ORDER — ONDANSETRON 4 MG/1
4 TABLET, ORALLY DISINTEGRATING ORAL EVERY 8 HOURS PRN
Status: DISCONTINUED | OUTPATIENT
Start: 2023-02-18 | End: 2023-02-25 | Stop reason: HOSPADM

## 2023-02-18 RX ORDER — LACTULOSE 10 G/15ML
30 SOLUTION ORAL ONCE
Status: COMPLETED | OUTPATIENT
Start: 2023-02-18 | End: 2023-02-18

## 2023-02-18 RX ORDER — HYDROXYZINE HYDROCHLORIDE 10 MG/1
10 TABLET, FILM COATED ORAL ONCE
Status: COMPLETED | OUTPATIENT
Start: 2023-02-18 | End: 2023-02-18

## 2023-02-18 RX ORDER — IPRATROPIUM BROMIDE AND ALBUTEROL SULFATE 2.5; .5 MG/3ML; MG/3ML
1 SOLUTION RESPIRATORY (INHALATION) EVERY 4 HOURS PRN
Status: DISCONTINUED | OUTPATIENT
Start: 2023-02-18 | End: 2023-02-25 | Stop reason: HOSPADM

## 2023-02-18 RX ORDER — MECOBALAMIN 5000 MCG
5 TABLET,DISINTEGRATING ORAL NIGHTLY
Status: DISCONTINUED | OUTPATIENT
Start: 2023-02-18 | End: 2023-02-25 | Stop reason: HOSPADM

## 2023-02-18 RX ORDER — GUAIFENESIN 600 MG/1
600 TABLET, EXTENDED RELEASE ORAL 2 TIMES DAILY
Status: DISCONTINUED | OUTPATIENT
Start: 2023-02-18 | End: 2023-02-25 | Stop reason: HOSPADM

## 2023-02-18 RX ORDER — ONDANSETRON 2 MG/ML
4 INJECTION INTRAMUSCULAR; INTRAVENOUS EVERY 6 HOURS PRN
Status: DISCONTINUED | OUTPATIENT
Start: 2023-02-18 | End: 2023-02-25 | Stop reason: HOSPADM

## 2023-02-18 RX ORDER — OXYCODONE HYDROCHLORIDE 5 MG/1
5 TABLET ORAL EVERY 6 HOURS PRN
Status: DISCONTINUED | OUTPATIENT
Start: 2023-02-18 | End: 2023-02-18

## 2023-02-18 RX ORDER — OXYCODONE HYDROCHLORIDE 5 MG/1
5 TABLET ORAL EVERY 4 HOURS PRN
Status: DISCONTINUED | OUTPATIENT
Start: 2023-02-18 | End: 2023-02-25 | Stop reason: HOSPADM

## 2023-02-18 RX ORDER — OXYCODONE HYDROCHLORIDE 5 MG/1
10 TABLET ORAL EVERY 4 HOURS PRN
Status: DISCONTINUED | OUTPATIENT
Start: 2023-02-18 | End: 2023-02-25 | Stop reason: HOSPADM

## 2023-02-18 RX ADMIN — LACTULOSE 30 G: 20 SOLUTION ORAL at 16:26

## 2023-02-18 RX ADMIN — MEROPENEM 1000 MG: 1 INJECTION, POWDER, FOR SOLUTION INTRAVENOUS at 14:39

## 2023-02-18 RX ADMIN — VANCOMYCIN HYDROCHLORIDE 1750 MG: 1 INJECTION, POWDER, LYOPHILIZED, FOR SOLUTION INTRAVENOUS at 09:03

## 2023-02-18 RX ADMIN — OXYCODONE 5 MG: 5 TABLET ORAL at 11:56

## 2023-02-18 RX ADMIN — MEROPENEM 1000 MG: 1 INJECTION, POWDER, FOR SOLUTION INTRAVENOUS at 05:39

## 2023-02-18 RX ADMIN — HYDROXYZINE HYDROCHLORIDE 10 MG: 10 TABLET ORAL at 05:34

## 2023-02-18 RX ADMIN — Medication 5 MG: at 21:16

## 2023-02-18 RX ADMIN — MEROPENEM 1000 MG: 1 INJECTION, POWDER, FOR SOLUTION INTRAVENOUS at 21:26

## 2023-02-18 RX ADMIN — GUAIFENESIN 600 MG: 600 TABLET ORAL at 11:59

## 2023-02-18 RX ADMIN — OXYCODONE 10 MG: 5 TABLET ORAL at 16:26

## 2023-02-18 RX ADMIN — GUAIFENESIN 600 MG: 600 TABLET ORAL at 21:16

## 2023-02-18 ASSESSMENT — ENCOUNTER SYMPTOMS
ABDOMINAL PAIN: 0
COUGH: 1
VOMITING: 0
DIARRHEA: 0
STRIDOR: 0
ABDOMINAL DISTENTION: 0
SHORTNESS OF BREATH: 1
CHEST TIGHTNESS: 0
NAUSEA: 0
TROUBLE SWALLOWING: 0
SORE THROAT: 0
CHOKING: 0
VOICE CHANGE: 0
WHEEZING: 0

## 2023-02-18 ASSESSMENT — PAIN - FUNCTIONAL ASSESSMENT
PAIN_FUNCTIONAL_ASSESSMENT: PREVENTS OR INTERFERES SOME ACTIVE ACTIVITIES AND ADLS
PAIN_FUNCTIONAL_ASSESSMENT: PREVENTS OR INTERFERES SOME ACTIVE ACTIVITIES AND ADLS

## 2023-02-18 ASSESSMENT — PAIN DESCRIPTION - DESCRIPTORS
DESCRIPTORS: SHARP;DULL
DESCRIPTORS: SHARP

## 2023-02-18 ASSESSMENT — PAIN DESCRIPTION - ORIENTATION
ORIENTATION: LEFT
ORIENTATION: UPPER;LEFT

## 2023-02-18 ASSESSMENT — PAIN SCALES - GENERAL
PAINLEVEL_OUTOF10: 5
PAINLEVEL_OUTOF10: 0
PAINLEVEL_OUTOF10: 10

## 2023-02-18 ASSESSMENT — PAIN DESCRIPTION - LOCATION
LOCATION: ABDOMEN
LOCATION: NECK

## 2023-02-18 NOTE — PROGRESS NOTES
Pleurx cap placed in a specimen cup by Dr. Yamile Cnoner, labeled/dated, and placed in patient's lock box by RN.

## 2023-02-18 NOTE — CONSULTS
Pulmonary and Critical Care Medicine  Consult Note  Encounter Date: 2023 7:35 AM    Mr. Josie Escobar is a 82 y.o. male  : 1935  Requesting Provider: Robe Quinonez MD    Reason for request: Shortness of breath            HISTORY OF PRESENT ILLNESS:    Patient is 82 y.o. presents with pleural effusion and shortness of breath, he reports few days history of worsening shortness of breath no chest pain, he has cough productive of clear phlegm, no worsening lower extremity edema, no nausea or vomiting, he was supposed to have nerve stimulator placement at [facility] however this was canceled due to possible CSF leak. Per note shortness of breath developed after procedure however patient states that he did have a even before. Denies nausea or vomiting, no fever or chills, no sick contact. He has a history of mesothelioma, status postradiation therapy and history of sleep apnea compliant with CPAP.           Past Medical History:        Diagnosis Date    A-fib ([facility]     approx 1 year ago-cardioverted    Arthritis     Baker's cyst of knee, left     Cancer ([facility] [address]     mesothelioma - radiation treatments    Cerebral artery occlusion with cerebral infarction ([facility] [address]     TIA sx felt funny --     Congestive heart failure ([address] 2022    Coronary artery disease     HTN (hypertension)     meds > 20 yrs    Hx of blood clots     DVT left leg     Hyperlipidemia     meds > 20 yrs    Pacemaker 2022    Polycythemia     Torn meniscus     left knee       Past Surgical History:        Procedure Laterality Date    BACK SURGERY      COLONOSCOPY      COLONOSCOPY      CORONARY ANGIOPLASTY WITH STENT PLACEMENT  10/2006    x 1 cardiac stent    ENDOSCOPY, COLON, DIAGNOSTIC      EYE SURGERY      Phaco with IOL OU    HERNIA REPAIR  2013    redo ca mesh in AQX.0897 -- umbilical hernia    JOINT REPLACEMENT Bilateral  &     Bilateral TKR    KNEE SURGERY Left      arthroscopic surgery to repair meniscus of left knee    LAMINECTOMY N/A 2021    RIGHT BILATERAL L2-3 3-4 4-5 MICRODECOMPRESSION performed by Noel Perez MD at [facility] 22    PAIN MANAGEMENT PROCEDURE N/A 2023    ATTEMPTED SPINAL CORD STIMULATOR PERMANENT PLACEMENT performed by Rima Brennan MD at [address] N/A 1/10/2023    SPINAL CORD STIMULATOR TRIAL performed by Rima Brennan MD at [address] [facility] [address]  age 8    [address]  2012       Social History:     reports that he quit smoking about 54 years ago. His smoking use included cigarettes. He has a 5.00 pack-year smoking history. He has never used smokeless tobacco. He reports current alcohol use. He reports that he does not use drugs.     Family History:       Problem Relation Age of Onset    Heart Attack Mother     Other Mother          in MVA    Other Father          at age 80    Other Sister          as infant    Cancer Brother     Other Brother         unknown medical hx    Other Brother          at age 61 due to accident    Cancer Daughter         colon & lung cancer    Colon Cancer Daughter     No Known Problems Son     Colon Cancer Other     Breast Cancer Other        Allergies:  Benadryl [diphenhydramine hcl] and Diphenhydramine        MEDICATIONS during current hospitalization:    Continuous Infusions:    Scheduled Meds:   meropenem  1,000 mg IntraVENous Q8H    melatonin  5 mg Oral Nightly    guaiFENesin  600 mg Oral BID    vancomycin (VANCOCIN) intermittent dosing (placeholder)   Other RX Placeholder    vancomycin  20 mg/kg (Adjusted) IntraVENous Once    Followed by    Karena Abler ON 2023] vancomycin  1,000 mg IntraVENous Q24H       PRN Meds:ondansetron **OR** ondansetron, ipratropium-albuterol        REVIEW OF SYSTEMS:  ROS: 10 organs review of system is done including general, psychological, ENT, hematological, endocrine, respiratory, cardiovascular, gastrointestinal, musculoskeletal, neurological,  allergy and Immunology is done and is otherwise negative. PHYSICAL EXAM:    Vitals:  /61   Pulse 85   Temp 98.4 °F (36.9 °C) (Oral)   Resp 16   Ht 5' 7\" (1.702 m)   Wt 269 lb (122 kg)   SpO2 100%   BMI 42.13 kg/m²     General: alert, cooperative, mild distress  Head: normocephalic, atraumatic  Eyes:No gross abnormalities. ENT:  MMM no lesions  Neck:  supple and no masses  Chest : Diminished air movement, bibasilar rales, no wheezing, nontender, tympanic  Heart[de-identified] Heart sounds are normal.  Regular rate and rhythm without murmur, gallop or rub. ABD:  symmetric, soft, non-tender, no guarding or rebound  Musculoskeletal : no cyanosis, no clubbing, and no edema  Neuro:  Grossly normal  Skin: No rashes or nodules noted. Lymph node:  no cervical nodes  Urology: No Horan   Psychiatric: appropriate        Data Review  Recent Labs     02/18/23  0508   WBC 12.2*   HGB 8.4*   HCT 25.1*         Recent Labs     02/16/23  0636 02/18/23  0508    137   K 4.9 5.2*   * 103   CO2 23 22   BUN 32* 31*   CREATININE 1.40* 1.95*   GLUCOSE 115* 131*       MV Settings: ABGs: No results for input(s): PHART, KCD4PPD, PO2ART, WWZ4CET, BEART, X5QSTHDO, SRA4OAT in the last 72 hours.   O2 Flow Rate (L/min): 5 L/min  Lab Results   Component Value Date/Time    LACTA 1.9 02/18/2023 05:08 AM       Radiology  I personally reviewed imaging studies and chest x-ray shows large left-sided pleural effusion        Assessment, plan:   Patient is at risk due to    Large left-sided pleural effusion, etiology is not clear could be due to underlying prior history of mesothelioma, will need further work-up  Worsening shortness of breath secondary to #1  SCOTTY  Heart failure  A-fib, on Coumadin at baseline currently Coumadin on hold    Recommendation  will discuss with thoracic surgery, will need chest tube, will consider placing chest tube if unable to do Pleurx  CT chest  O2 to keep sat 90 to 92%  Continue home CPAP  Empiric antibiotic  Transfer to ICU if worsening shortness of breath       Thank you for consultation    Electronically signed by Apryl Ferguson MD, [facility],  on 2/18/2023 at 7:35 AM

## 2023-02-18 NOTE — PROGRESS NOTES
Admission assessment and med rec completed, see flow sheets. Dr Patrick Quiroz notified. Oriented to room and call light. Call light within reach. No other needs stated by pt at this time.

## 2023-02-18 NOTE — PLAN OF CARE
Problem: Discharge Planning  Goal: Discharge to home or other facility with appropriate resources  Flowsheets  Taken 2/18/2023 4211  Discharge to home or other facility with appropriate resources:   Identify barriers to discharge with patient and caregiver   Arrange for needed discharge resources and transportation as appropriate   Identify discharge learning needs (meds, wound care, etc)  Taken 2/18/2023 0606  Discharge to home or other facility with appropriate resources:   Identify barriers to discharge with patient and caregiver   Identify discharge learning needs (meds, wound care, etc)

## 2023-02-18 NOTE — CONSULTS
Hematology/Oncology Consult  Encounter Date: 2023 2:49 PM    Mr. Ulysses Admire is a 82 y.o. male  : 1935  MRN: [id number]  Acct Number: [de-identified]  Requesting Provider: Sahra Peraza MD    Reason for request: Mesothelioma s/p RT; needs VATS      CONSULTANT: Hardy Jurado MD    HPI: The pt has Mesothelioma  initially Dx in 2022 after pt sustained  pneumothorax  of left chest from fractured rib due to a  fall. He was found to have Mesothelioma which was initially observed and then the pt received radiation tx. in 2022. He develop SOB due to left pleural effusion for which the pt was admitted last night and underwent Pleurx catheter  placement in the left chest  for drainage. He feels better with less SOB after the pleural fluid was drained. The pt said that he also had intermittent dark stool since  early 2023; no abd  pain, N/V or BRBPR. He has some easy fatigue; no dizziness. . He has occ cough but no chest pain or hemoptysis.     Patient Active Problem List   Diagnosis    Ventral hernia    Ventral hernia, recurrent    Polycythemia    Spinal stenosis of lumbar region with neurogenic claudication    Atherosclerosis of native artery of both lower extremities with intermittent claudication (HCC)    Atrial fibrillation (HCC)    BPH with obstruction/lower urinary tract symptoms    Congenital cystic kidney disease    Venous insufficiency    Intermittent claudication (HCC)    Transient ischemic attack    Sleep apnea    Rosacea    Fuchs' corneal dystrophy    Excessive daytime sleepiness    Hx of blood clots    Former smoker    Lumbar stenosis with neurogenic claudication    Congestive heart failure (HCC)    Lumbar spondylosis    Balance disorder    Postlaminectomy syndrome, lumbar region    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    Mesothelioma [facility])    Mixed hyperlipidemia    Sick sinus syndrome ([facility] [address]    Presence of cardiac pacemaker    Lumbar post-laminectomy syndrome Pleural effusion     Past Medical History:   Diagnosis Date    A-fib ([facility] [address]     approx 1 year ago-cardioverted    Arthritis     Baker's cyst of knee, left     Cancer ([facility] [address]     mesothelioma - radiation treatments    Cerebral artery occlusion with cerebral infarction ([facility] [address]     TIA sx felt funny --     Congestive heart failure ([facility] [address] 2022    Coronary artery disease     HTN (hypertension)     meds > 20 yrs    Hx of blood clots     DVT left leg     Hyperlipidemia     meds > 20 yrs    Pacemaker 2022    Polycythemia     Torn meniscus     left knee     Past Surgical History:   Procedure Laterality Date    BACK SURGERY      COLONOSCOPY      COLONOSCOPY      CORONARY ANGIOPLASTY WITH STENT PLACEMENT  10/2006    x 1 cardiac stent    ENDOSCOPY, COLON, DIAGNOSTIC      EYE SURGERY      [place] with IOL OU    HERNIA REPAIR  2013    redo ca mesh in PXX.5829 -- umbilical hernia    JOINT REPLACEMENT Bilateral  &     Bilateral TKR    KNEE SURGERY Left      arthroscopic surgery to repair meniscus of left knee    LAMINECTOMY N/A 2021    RIGHT BILATERAL L2-3 3-4 4-5 MICRODECOMPRESSION performed by Otilio Delgado MD at [facility] 22    PAIN MANAGEMENT PROCEDURE N/A 2023    ATTEMPTED SPINAL CORD STIMULATOR PERMANENT PLACEMENT performed by Omer Cabrales MD at [address] N/A 1/10/2023    SPINAL CORD STIMULATOR TRIAL performed by Omer Cabrales MD at [place]  age 8    [address]  2012     Family History   Problem Relation Age of Onset    Heart Attack Mother     Other Mother          in MVA    Other Father          at age 80    Other Sister          as infant    Cancer Brother     Other Brother         unknown medical hx    Other Brother          at age 61 due to accident    Cancer Daughter         colon & lung cancer    Colon Cancer Daughter     No Known Problems Son Colon Cancer Other     Breast Cancer Other      Social History     Socioeconomic History    Marital status:      Spouse name: Not on file    Number of children: Not on file    Years of education: Not on file    Highest education level: Not on file   Occupational History    Not on file   Tobacco Use    Smoking status: Former     Packs/day: 0.50     Years: 10.00     Pack years: 5.00     Types: Cigarettes     Quit date: 1968     Years since quittin.8    Smokeless tobacco: Never   Vaping Use    Vaping Use: Never used   Substance and Sexual Activity    Alcohol use: Yes     Comment: social    Drug use: Never    Sexual activity: Not on file   Other Topics Concern    Not on file   Social History Narrative    Not on file     Social Determinants of Health     Financial Resource Strain: Not on file   Food Insecurity: Not on file   Transportation Needs: Not on file   Physical Activity: Not on file   Stress: Not on file   Social Connections: Not on file   Intimate Partner Violence: Not on file   Housing Stability: Not on file          Current Facility-Administered Medications   Medication Dose Route Frequency Provider Last Rate Last Admin    ondansetron (ZOFRAN-ODT) disintegrating tablet 4 mg  4 mg Oral Q8H PRN Kash Hernandezar, DO        Or    ondansetron (ZOFRAN) injection 4 mg  4 mg IntraVENous Q6H PRN Kash Hernandezar, DO        meropenem (MERREM) 1,000 mg in sodium chloride 0.9 % 100 mL IVPB (mini-bag)  1,000 mg IntraVENous Q8H Agustin Hernandezar,  mL/hr at 23 1439 1,000 mg at 23 1439    melatonin disintegrating tablet 5 mg  5 mg Oral Nightly Val Antunez DO        guaiFENesin [facility]) extended release tablet 600 mg  600 mg Oral BID Kash Archibald Sedar, DO   600 mg at 23 1159    ipratropium-albuterol (DUONEB) nebulizer solution 1 ampule  1 ampule Inhalation Q4H PRN Helen Selby DO        vancomycin (VANCOCIN) intermittent dosing (placeholder)   Other RX Placeholder Val Antunez DO        oxyCODONE (ROXICODONE) immediate release tablet 5 mg  5 mg Oral Q6H PRN Deena Rosas MD   5 mg at 02/18/23 1156    [START ON 2/19/2023] vancomycin (VANCOCIN) 750 mg in sodium chloride 0.9 % 250 mL IVPB  750 mg IntraVENous Q24H Izabela Antunez,          Facility-Administered Medications Ordered in Other Encounters   Medication Dose Route Frequency Provider Last Rate Last Admin    sodium chloride flush 0.9 % injection 10 mL  10 mL IntraVENous PRN Scooter Zelaya MD   10 mL at 07/10/19 1220     No outpatient medications have been marked as taking for the 2/18/23 encounter [facility] Encounter). Allergies   Allergen Reactions    Benadryl [Diphenhydramine Hcl] Anxiety    Diphenhydramine Anxiety     anxious         ROS:  Unremarkable except for symptoms mentioned in HPI. PHYSICAL EXAMINATION:   VITAL SIGNS: /61   Pulse 85   Temp 98.4 °F (36.9 °C) (Oral)   Resp 20   Ht 5' 7\" (1.702 m)   Wt 269 lb (122 kg)   SpO2 100%   BMI 42.13 kg/m²       GENERAL: In no acute distress, well- nourished, well- developed, alert and oriented to person place and time. SKIN: Warm and dry, without jaundice, ecchymoses, or petechiae. HEENT: Normocephalic, pale conj, sclera anicteric, oral mucosa moist without lesion or exudate in the visible oral cavity or oropharynx, No epistaxis  NECK: supple, no JVD, no thyromegaly  NODES: No palpable adenopathy in the neck Levels I-V, bilateral supraclavicular fossae, axillary chains, or inguinal regions. LUNGS: Good inspiratory effort, no accessory muscle use, decreased BS over left lung base, clear BS  over RLF,no focal wheeze, rales or rhonchi. + Pleurx catheter on left lower chest wall with dark red pleural fluid in the drainage bag  CARDIAC: Normal HS; Regular rate and rhythm,   ABDOMINAL: soft, non-tender, no mass or organomegaly. MUSKL no tenderness over spine or ribs  EXTREMITIES: no pedal edema or calf tenderness  NEUROLOGIC: Gait not tested.  No grossly apparent focal deficits.   PSYCH: cooperative; pt has appropriate mood and behavior    LAB RESULTS:  Recent Results (from the past 24 hour(s))   Lactic Acid    Collection Time: 02/18/23  5:08 AM   Result Value Ref Range    Lactic Acid 1.9 0.5 - 2.2 mmol/L   CBC with Auto Differential    Collection Time: 02/18/23  5:08 AM   Result Value Ref Range    WBC 12.2 (H) 4.8 - 10.8 K/uL    RBC 2.75 (L) 4.70 - 6.10 M/uL    Hemoglobin 8.4 (L) 14.0 - 18.0 g/dL    Hematocrit 25.1 (L) 42.0 - 52.0 %    MCV 91.2 79.0 - 92.2 fL    MCH 30.7 27.0 - 31.3 pg    MCHC 33.7 33.0 - 37.0 %    RDW 15.7 (H) 11.5 - 14.5 %    Platelets 106 259 - 458 K/uL    PLATELET SLIDE REVIEW Normal     Neutrophils % 82.0 %    Lymphocytes % 4.0 %    Monocytes % 10.5 %    Eosinophils % 0.1 %    Basophils % 0.3 %    Neutrophils Absolute 10.0 (H) 1.4 - 6.5 K/uL    Lymphocytes Absolute 1.0 1.0 - 4.8 K/uL    Monocytes Absolute 1.3 (H) 0.2 - 0.8 K/uL    Eosinophils Absolute 0.0 0.0 - 0.7 K/uL    Basophils Absolute 0.0 0.0 - 0.2 K/uL    Atypical Lymphocytes Relative 4 %    Anisocytosis 1+     Macrocytes 1+     Microcytes 1+     Polychromasia 1+     Hypochromia 1+     Poikilocytes 1+    Basic Metabolic Panel    Collection Time: 02/18/23  5:08 AM   Result Value Ref Range    Sodium 137 135 - 144 mEq/L    Potassium 5.2 (H) 3.4 - 4.9 mEq/L    Chloride 103 95 - 107 mEq/L    CO2 22 20 - 31 mEq/L    Anion Gap 12 9 - 15 mEq/L    Glucose 131 (H) 70 - 99 mg/dL    BUN 31 (H) 8 - 23 mg/dL    Creatinine 1.95 (H) 0.70 - 1.20 mg/dL    Est, Glom Filt Rate 33.0 (L) >60    Calcium 9.0 8.5 - 9.9 mg/dL   Protime-INR    Collection Time: 02/18/23  5:08 AM   Result Value Ref Range    Protime 17.2 (H) 12.3 - 14.9 sec    INR 1.4      Recent Labs     02/18/23  0508   GLUCOSE 131*        RADIOLOGY RESULTS:  XR ABDOMEN (KUB) (SINGLE AP VIEW)    Result Date: 2/18/2023  EXAMINATION: FOUR SUPINE XRAY VIEW(S) OF THE ABDOMEN 2/18/2023 7:02 am COMPARISON: CHEST X-RAY 02/18/2023 HISTORY: ORDERING SYSTEM PROVIDED HISTORY: abd distentnion TECHNOLOGIST PROVIDED HISTORY: Reason for exam:->abd distentnion What reading provider will be dictating this exam?->CRC FINDINGS: Complete opacification of left hemithorax compatible with some combination of pleural effusion and atelectasis. Obscuration of the left heart border. Pericardial effusion not excluded. Dual chamber transvenous pacemaker in place. The lower pelvis was not imaged. Nonobstructive bowel gas pattern. The stomach, small bowel, and colon are nondilated. 9 mm calcification compatible with a stone at the midpole of the left kidney. Degenerative changes of the lumbar spine. 1.  Complete opacification of left hemithorax compatible with some combination of pleural effusion and atelectasis. Secondary obscuration of left heart border. 2.  Nonobstructive bowel gas pattern. No acute abdominal disease identified. 3.  Probable left renal stone. XR CHEST PORTABLE    Result Date: 2/18/2023  EXAMINATION: ONE XRAY VIEW OF THE CHEST 2/18/2023 7:02 am COMPARISON: There are no prior recent studies available for review HISTORY: ORDERING SYSTEM PROVIDED HISTORY: loculated pleural effusion TECHNOLOGIST PROVIDED HISTORY: Reason for exam:->loculated pleural effusion What reading provider will be dictating this exam?->CRC FINDINGS: The cardiac silhouette appears enlarged. Please note the left heart border is obscured due to the left lung pathology. There is near complete whiteout of the left hemithorax. The finding could represent a combination of partial collapse of the left lung and large pleural effusion. There is no right lung infiltrate or right pleural effusion. 1. Near complete whiteout of the left hemithorax likely represent a combination of partial collapse of the left lung and large pleural effusion 2. Cardiomegaly 3. The right lung is grossly clear.      FLUORO FOR SURGICAL PROCEDURES    Result Date: 2/16/2023  EXAMINATION: SPOT FLUOROSCOPIC IMAGES 2/16/2023 6:54 am TECHNIQUE: Fluoroscopy was provided by the radiology department for procedure. Radiologist was not present during examination. FLUOROSCOPY DOSE AND TYPE: Radiation Exposure Index: Fluoroscopy time equals 56.5 seconds. Total dose equals 43.68 mGy, COMPARISON: None HISTORY: ORDERING SYSTEM PROVIDED HISTORY: pain TECHNOLOGIST PROVIDED HISTORY: Reason for exam:->pain What reading provider will be dictating this exam?->CRC Intraprocedural imaging. FINDINGS: 12 spot images of the lumbar spine were obtained. Intraprocedural fluoroscopic spot images as above. See separate procedure report for more information. .     ASSESSMENT AND PLAN  The pt has mesothelioma s/p RT. He now has left pleural effusion for which Pleurx drain was placed. He has symptomatic improvement after drainage of the pleural fluid. --the pt will follow-up with his medical oncologist Dr. Aj Nelson at [facility] regarding treatment options including  possible chemotx or palliative care including intermittent drainage of pleural fluid  and possible Hospice referral if pt's  performance status is not adequate for chemotx. 2. Anemia which is probably multifactorial including GI blood loss (with hx of dark stool) and bloody pleural effusion as well as anemia due to chronic disease from his malignancy. --serum Fe, TIBC and ferritin levels, reticulocyte count and B12 and folate levels will be checked;  --pt will be considered for possible IV Fe tx if Fe studies show  Fe  deficiency anemia  -- I recommend  GI consultation  for EGD and possible colonoscopy     Thank you, Trisha Lozada, for this consultation.     Electronically signed by Nancy Corona MD on 2/18/2023 at 2:49 PM

## 2023-02-18 NOTE — PROCEDURES
Maria Leon is a 82 y.o. male patient. No diagnosis found. Past Medical History:   Diagnosis Date    A-fib ([facility] [address]     approx 1 year ago-cardioverted    Arthritis     Baker's cyst of knee, left     Cancer ([facility] [address]     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    Cerebral artery occlusion with cerebral infarction ([facility] [address] 2000    TIA sx felt funny --     Congestive heart failure ([facility] [address] 03/08/2022    Coronary artery disease     HTN (hypertension)     meds > 20 yrs    Hx of blood clots 2012    DVT left leg     Hyperlipidemia     meds > 20 yrs    Pacemaker 07/19/2022    Polycythemia     Torn meniscus     left knee     Blood pressure 110/61, pulse 85, temperature 98.4 °F (36.9 °C), temperature source Oral, resp. rate 28, height 5' 7\" (1.702 m), weight 269 lb (122 kg), SpO2 100 %. Chest Tube Insertion    Date/Time: 2/18/2023 10:57 AM  Performed by: Elise Bustillo MD  Authorized by: Elise Bustillo MD   Consent: Verbal consent obtained.   Risks and benefits: risks, benefits and alternatives were discussed  Consent given by: patient  Patient understanding: patient states understanding of the procedure being performed  Imaging studies: imaging studies available  Indications: pleural effusion    Sedation:  Patient sedated: no    Anesthesia: local infiltration    Anesthesia:  Local Anesthetic: lidocaine 1% without epinephrine  Preparation: skin prepped with ChloraPrep  Placement location: left lateral  Suture material: 2-0 silk  Dressing: 4x4 sterile gauze  Patient tolerance: patient tolerated the procedure well with no immediate complications  Comments: Placement of tunneled pleural catheter        [place] MD  2/18/2023

## 2023-02-18 NOTE — PROGRESS NOTES
Shift assessment complete. Meds as ordered. Free from falls/injuries at this time. No new skin impairments reported. Resting in bed. No s/s of bleeding at this time. Able to make needs known. Bed in lowest position. Call light within reach. Will continue to monitor.

## 2023-02-18 NOTE — PROGRESS NOTES
Pharmacy Note  Vancomycin Consult    Luis Stubbs is a 82 y.o. male started on Vancomycin for pleural effusion; consult received from Dr. Gianna Pan to manage therapy. Also receiving the following antibiotics: Merrem. Pleural effusion [J90]  Allergies: Benadryl [diphenhydramine hcl] and Diphenhydramine    Temp max: 97 *F    Cultures  No results for input(s): BC, BLOODCULT2, MRSAC, RESPCULTURE, LABGRAM, ORG, CXSURG, WNDABS, LABANAE, LABURIN, SPNEUAGU, HSVSRC, FLUA, FLUB, AFBSMEAR, CXST, FUNGUSSMEAR, LABLEGI, VRECX, CDIFPCR, FLQUQ146, GIAAGS, ROTA, FECLEU, COVID19 in the last 720 hours. ,  , There is no height or weight on file to calculate BMI. Recent Labs     02/16/23  0636   CREATININE 1.40*   Estimated Creatinine Clearance: 48 mL/min (A) (based on SCr of 1.4 mg/dL (H)). .    Goal AUC24,ss Level: 400-600 mg/L.hr    Assessment/Plan:  Will initiate vancomycin 1,750mg IV x1 followed by vancomycin 1,000 mg IV every 24 hours. LOT TBD. Predicted AUC24,ss 528 mg/L.hr, Ctrough,ss 15.5 mg/L, PAUC 73 %, Pconc 34 %, Tox 11 %. Random Vancomycin level ordered for 02/20/23 with morning labs. Timing of future trough levels will be determined based on culture results, renal function, and clinical response. Thank you for the consult. Will continue to follow.   Char Lopez, [facility] 2/18/2023 5:17 AM

## 2023-02-18 NOTE — CARE COORDINATION
Case Management Assessment  Initial Evaluation    Date/Time of Evaluation: 2/18/2023 10:26 AM  Assessment Completed by: Bhavik Daley RN    If patient is discharged prior to next notation, then this note serves as note for discharge by case management. Patient Name: Eliana Gayle                   YOB: 1935  Diagnosis: Pleural effusion [J90]                   Date / Time: 2/18/2023  3:39 AM    Patient Admission Status: Inpatient   Readmission Risk (Low < 19, Mod (19-27), High > 27): Readmission Risk Score: 16.3    Current PCP: Rodney Ridley, DO  PCP verified by CM? Yes    Chart Reviewed: Yes      History Provided by: Spouse  Patient Orientation: Alert and Oriented    Patient Cognition: Alert    Hospitalization in the last 30 days (Readmission):  No    If yes, Readmission Assessment in CM Navigator will be completed. Advance Directives:      Code Status: Full Code   Patient's Primary Decision Maker is:        Discharge Planning:    Patient lives with: Spouse/Significant Other Type of Home: House  Primary Care Giver: Self  Patient Support Systems include: Spouse/Significant Other, Family Members, [employer]   Current Financial resources:    Current community resources:    Current services prior to admission: C-pap            Current DME:              Type of Home Care services:  None    ADLS  Prior functional level:    Current functional level: Independent in ADLs/IADLs, Other (see comment) (uses walker)    PT AM-PAC:   /24  OT AM-PAC:   /24    Family can provide assistance at DC: Yes  Would you like Case Management to discuss the discharge plan with any other family members/significant others, and if so, who?  Yes (pt spouse)  Plans to Return to Present Housing: Yes  Other Identified Issues/Barriers to RETURNING to current housing: YES  Potential Assistance needed at discharge: N/A            Potential DME:    Patient expects to discharge to: [address] [place] [address] for transportation at discharge:      Financial    Payor: MEDICARE / Plan: MEDICARE PART A AND B / Product Type: *No Product type* /     Does insurance require precert for SNF: No    Potential assistance Purchasing Medications: No  Meds-to-Beds request:        [id number], [address]  [address] [facility]  [place] [address]  Phone: [phone] Fax: [phone]      Notes:    Factors facilitating achievement of predicted outcomes: Family support    Barriers to discharge: Medical complications    Additional Case Management Notes: CALL TO PT ROOM WHO IS UNABLE TO TALK AT [address]. CALL TO PT SPOUSE. PT LIVES AT HOME WITH HER AND USES A WALKER FOR AMBULATION. PT USES A CPAP AT . PT IS A  BUT IS NOT SERVICE CONNECTED AND [facility] WITH SPOUSE. FREEDOM OF CHOICE OFFERED AND SPOUSE WOULD LIKE [address] ON DISCHARGE FOR PT. CALL TO MÓNICA WITH [address] WITH REFERRAL, WILL NEED ORDER ON DISCHARGE. The Plan for Transition of Care is related to the following treatment goals of Pleural effusion [J18]    IF APPLICABLE: The Patient and/or patient representative Abbey Christian and his family were provided with a choice of provider and agrees with the discharge plan. Freedom of choice list with basic dialogue that supports the patient's individualized plan of care/goals and shares the quality data associated with the providers was provided to:     Patient Representative Name:       The Patient and/or Patient Representative Agree with the Discharge Plan?       Shelva Collet, RN  Case Management Department

## 2023-02-18 NOTE — H&P
Hospital Medicine  History and Physical    Patient:  Cal Choi  MRN: [id number]    CHIEF COMPLAINT: loculated pleural effusion    History Obtained From:  patient, electronic medical record  Primary Care Physician: Ernie Franco DO    HISTORY OF PRESENT ILLNESS:   The patient is a 82 y.o. male who presents with chelated pleural effusion from a [facility].  Patient is an 21-year-old male who was at [facility] for a possible nerve stimulator placement. Procedure was set to be performed at [facility]. It was initially delayed due to an elevated INR however when the procedure was initiated patient had a possible CSF leak and the procedure was aborted. Postoperatively patient developed worsening shortness of breath and was brought to [facility] for repeat evaluation he is found to have a large loculated pleural effusion. Patient was started on empiric antibiotic therapy at [facility] ICU and transferred to [facility] for thoracotomy/VATS decortication. Case was discussed between Dr. Meenakshi Jackman and Dr. Molly Landry. Patient has a history of paroxysmal atrial fibrillation on warfarin. [facility]. Patient also has a history of mesothelioma status post radiation therapy. He has a history of previous TIA in the past x2 along with congestive heart failure, hypertension hyperlipidemia and polycythemia. On transfer emergency [facility] 1762 he is on 5 L supplemental O2 in mild respiratory distress but denies any fevers or chills chest pain or palpitations. He states that he has shortness of breath with even minimal exertion he has an elevated BUN and creatinine 32 and 1.4 appears to be at baseline.   He does complain of distended abdomen states his last bowel movement 3 days ago he is urinating well    Past Medical History:      Diagnosis Date    A-fib ([address]     approx 1 year ago-cardioverted    Arthritis     Baker's cyst of knee, left     Cancer [facility] [address]     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 Cerebral artery occlusion with cerebral infarction ([facility] [address] 2000    TIA sx felt funny --     Congestive heart failure ([facility] [address] 03/08/2022    Coronary artery disease     HTN (hypertension)     meds > 20 yrs    Hx of blood clots 2012    DVT left leg     Hyperlipidemia     meds > 20 yrs    Pacemaker 07/19/2022    Polycythemia     Torn meniscus     left knee       Past Surgical History:      Procedure Laterality Date    BACK SURGERY      COLONOSCOPY  2009    COLONOSCOPY  2012    CORONARY ANGIOPLASTY WITH STENT PLACEMENT  10/2006    x 1 cardiac stent    ENDOSCOPY, COLON, DIAGNOSTIC      EYE SURGERY      [place] with IOL OU    HERNIA REPAIR  03/2013    redo ca mesh in [id number] -- umbilical hernia    JOINT REPLACEMENT Bilateral 2009 & 2011    Bilateral TKR    KNEE SURGERY Left      arthroscopic surgery to repair meniscus of left knee    LAMINECTOMY N/A 02/08/2021    RIGHT BILATERAL L2-3 3-4 4-5 MICRODECOMPRESSION performed by Liya Sadler MD at [facility] 7/19/22    PAIN MANAGEMENT PROCEDURE N/A 2/16/2023    ATTEMPTED SPINAL CORD STIMULATOR PERMANENT PLACEMENT performed by Leroy Patel MD at [address] N/A 1/10/2023    SPINAL CORD STIMULATOR TRIAL performed by Leroy Patel MD at [place]  age 8    [address]  03/2012       Medications Prior to Admission:    Prior to Admission medications    Medication Sig Start Date End Date Taking?  Authorizing Provider   carboxymethylcellulose (THERATEARS) 1 % ophthalmic gel Apply to eye    Historical Provider, MD   melatonin 5 MG TABS tablet Take by mouth    Historical Provider, MD   enoxaparin (LOVENOX) 100 MG/ML Inject into the skin 2 times daily    Historical Provider, MD   metoprolol tartrate (LOPRESSOR) 50 MG tablet Take 25 mg by mouth 2 times daily    Historical Provider, MD   Elastic Bandages & Supports ([facility] ) [facility] 1 each by Does not apply route daily Thigh high 20-30 mmhg graduated compression stockings both legs wear daily during day and off Qhs. Wear as tolerated. Do not wear if they cause increased pain. 8/25/22   EDELMIRA Sepulveda - CNP   losartan (COZAAR) 100 MG tablet Take 100 mg by mouth in the morning. Historical Provider, MD   meloxicam (MOBIC) 15 MG tablet Take 15 mg by mouth in the morning. Patient not taking: Reported on 2/18/2023    Historical Provider, MD   potassium chloride (KLOR-CON M) 20 MEQ extended release tablet Take 20 mEq by mouth in the morning and 20 mEq before bedtime. Historical Provider, MD   rosuvastatin (CRESTOR) 10 MG tablet TAKE 1 TABLET BY MOUTH EVERY DAY 2/24/22   Historical Provider, MD   furosemide (LASIX) 20 MG tablet Take 20 mg by mouth daily    Historical Provider, MD   warfarin (COUMADIN) 2.5 MG tablet Take 4 mg by mouth daily Indications: T-Th-Sat - Sun Daily per INR levels    Historical Provider, MD   nitroGLYCERIN (NITROSTAT) 0.4 MG SL tablet Place 0.4 mg under the tongue every 5 minutes as needed for Chest pain up to max of 3 total doses. If no relief after 1 dose, call 911. Historical Provider, MD   Omega-3 Fatty Acids (FISH OIL) 1200 MG CAPS Take by mouth daily    Historical Provider, MD   Cholecalciferol (VITAMIN D3) 125 MCG (5000 UT) TABS Take by mouth daily    Historical Provider, MD   CPAP Machine MISC by Does not apply route    Historical Provider, MD   warfarin (COUMADIN) 5 MG tablet Take 5 mg by mouth daily Indications: Gepmht-Yuxijnmsi-Sehavw Daily per INR levels 2/26/13   Historical Provider, MD   aspirin 81 MG EC tablet Take 81 mg by mouth daily. Historical Provider, MD       Allergies:  Benadryl [diphenhydramine hcl] and Diphenhydramine    Social History:   TOBACCO:   reports that he quit smoking about 54 years ago. His smoking use included cigarettes. He has a 5.00 pack-year smoking history. He has never used smokeless tobacco.  ETOH:   reports current alcohol use.   OCCUPATION: None    Family History:       Problem Relation Age of Onset    Heart Attack Mother     Other Mother          in MVA    Other Father          at age 80    Other Sister          as infant    Cancer Brother     Other Brother         unknown medical hx    Other Brother          at age 61 due to accident    Cancer Daughter         colon & lung cancer    Colon Cancer Daughter     No Known Problems Son     Colon Cancer Other     Breast Cancer Other        REVIEW OF SYSTEMS:  Ten systems reviewed and negative except for as above. Physical Exam:    Vitals: There were no vitals taken for this visit. Constitutional: alert, appears stated age and cooperative pleasant alert obese  Skin: Skin color, texture, turgor normal. No rashes or lesions  Eyes:Eye: Normal external eye, conjunctiva, YADY. ENT: Head: Normocephalic, no lesions, without obvious abnormality. Neck: no adenopathy, no carotid bruit, no JVD, supple, symmetrical, trachea midline and thyroid not enlarged, symmetric, no tenderness/mass/nodules  Respiratory: clear to auscultation bilaterally minimal breath sounds on the left clear on the right  Cardiovascular: regular rate and rhythm, S1, S2 normal, no murmur, click, rub or gallop  Gastrointestinal: soft, distended tympanic  Genitourinary: Deferred  Musculoskeletal:extremities normal, atraumatic, no cyanosis or edema  Neurologic: Mental status AAOx3 No facial asymmetry or droop. Normal muscle strength b/l. Psychiatric: Appropriate mood and affect. Good insight and judgement  Hematologic: No obvious bruising or bleeding    No results for input(s): WBC, HGB, PLT in the last 72 hours. Recent Labs     23  0636      K 4.9   *   CO2 23   BUN 32*   CREATININE 1.40*   GLUCOSE 115*       No results found. EKG:, Pending    Assessment and Plan   Loculated pleural effusion: Consult to thoracic surgery for VATS. Consult to pulmonary for further recommendations.   With meropenem and vancomycin. Sputum cultures, blood cultures. New bronchodilators incentive spirometry Mucinex Acapella    Mesothelioma: Consult to oncology   Constipation: Stat KUB laxatives his surgery is not to be performed today   atrial fibrillation: Hold warfarin. Stat INR. Continue metoprolol for rate control. Chronic pain with spinal stimulator: Consult to pain management   Sick sinus syndrome status post pacemaker: Continue beta-blocker  SCOTTY escalate to BiPAP before surgery. 5 L bleed in O2  Polycythemia: Follow CBC.   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    Venous insufficiency I87.2    Intermittent claudication (HCC) I73.9    Transient ischemic attack G45.9    Sleep apnea G47.30    Rosacea L71.9    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    Excessive daytime sleepiness G47.19    Hx of blood clots Z86.718    Former smoker Z87.891    Lumbar stenosis with neurogenic claudication M48.062    Congestive heart failure (HCC) I50.9    Lumbar spondylosis M47.816    Balance disorder R26.89    Postlaminectomy syndrome, lumbar region M96.1    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.9    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    Mixed hyperlipidemia E78.2    Sick sinus syndrome ([address] I49.5    Presence of cardiac pacemaker Z95.0    Lumbar post-laminectomy syndrome M96.1    Pleural effusion J90       Ana Greco DO  Admitting Hospitalist    Emergency Contact:

## 2023-02-18 NOTE — PROGRESS NOTES
[facility] Pharmacokinetic Monitoring Service - Vancomycin    Consulting Provider: Dr. Marli Hong   Indication: pleural effusion  Target Concentration: Goal AUC/MANOLO 400-600 mg*hr/L  Day of Therapy: 1  Additional Antimicrobials: Meropenem    Pertinent Laboratory Values: Wt Readings from Last 1 Encounters:   02/18/23 269 lb (122 kg)     Temp Readings from Last 1 Encounters:   02/18/23 98.4 °F (36.9 °C) (Oral)     Estimated Creatinine Clearance: 35 mL/min (A) (based on SCr of 1.95 mg/dL (H)). Recent Labs     02/16/23  0636 02/18/23  0508   CREATININE 1.40* 1.95*   WBC  --  12.2*     Procalcitonin: No results for input(s): PROCAL in the last 72 hours. Pertinent Cultures:  Culture Date Source Results   01/18/2023 Blood Cultures x 2 Pending   MRSA Nasal Swab: N/A. Non-respiratory infection.     Recent vancomycin administrations                     vancomycin (VANCOCIN) 1,750 mg in sodium chloride 0.9 % 500 mL IVPB (mg) 1,750 mg New Bag 02/18/23 0903                    Assessment:  Date/Time Current Dose Concentration Timing of Concentration (h) AUC   02/18/2023 Vancomycin 1000 mg IVPB Q24H - - 716 mg/L.hr   Note: Serum concentrations collected for AUC dosing may appear elevated if collected in close proximity to the dose administered, this is not necessarily an indication of toxicity    Plan:  Current dosing regimen is supra-therapeutic  New creatinine today markedly increased from 02/16, requiring lower doses of vancomycin  \"Decrease dose to vancomycin 750 mg IVPB Q24H  Dose Predicted SS Trough Predicted SS AUC   Vancomycin 1000 mg IVPB Q24H 23 mg/L 716 mg/L.hr   Vancomycin 750 mg IVPB Q24H 17.8 mg/L 552 mg/L.hr   Repeat vancomycin concentration ordered for 02/20 @ 0600   Pharmacy will continue to monitor patient and adjust therapy as indicated    Thank you for the consult,    Darrel Hatfield, PharmD  PGY-1 Pharmacy Resident  2/18/2023 11:48 AM

## 2023-02-18 NOTE — CONSULTS
Department of  Chronic Pain Managment  Attending Progress Note      SUBJECTIVE  Upper chest wall pain. OBJECTIVE: Patient known complaint of bilateral calf. This is a result of no known injury. Vascular studies WNL,Onset of pain was 1 year ago after a laminotomy and has been stable since. described as Throbbing and rated as moderate, to both feet. Symptoms include weakness both legs. The patient denies incontinence, dysuria. The patient denies other injuries. Stopped Lyrica which made him Goffy Had an EMG that showed Neuropathy, following with Vascular surgeon was told no circulation issues, Continues to have pain both calves. We planned stim placement last Thursday he had a wet tap and the procedure was Aborted, He had chest tube placed this morning for effusion having Shoulder pain since. On oxycodone 5-10 mg Q 4 hrs PRN with reasonable relief.     Medications  Current Facility-Administered Medications: ondansetron (ZOFRAN-ODT) disintegrating tablet 4 mg, 4 mg, Oral, Q8H PRN **OR** ondansetron (ZOFRAN) injection 4 mg, 4 mg, IntraVENous, Q6H PRN  meropenem (MERREM) 1,000 mg in sodium chloride 0.9 % 100 mL IVPB (mini-bag), 1,000 mg, IntraVENous, Q8H  melatonin disintegrating tablet 5 mg, 5 mg, Oral, Nightly  guaiFENesin (MUCINEX) extended release tablet 600 mg, 600 mg, Oral, BID  ipratropium-albuterol (DUONEB) nebulizer solution 1 ampule, 1 ampule, Inhalation, Q4H PRN  vancomycin (VANCOCIN) intermittent dosing (placeholder), , Other, RX Placeholder  [COMPLETED] vancomycin (VANCOCIN) 1,750 mg in sodium chloride 0.9 % 500 mL IVPB, 20 mg/kg (Adjusted), IntraVENous, Once **FOLLOWED BY** [START ON 2/19/2023] vancomycin (VANCOCIN) 750 mg in sodium chloride 0.9 % 250 mL IVPB, 750 mg, IntraVENous, Q24H  lactulose (CHRONULAC) 10 GM/15ML solution 30 g, 30 g, Oral, Once  oxyCODONE (ROXICODONE) immediate release tablet 5 mg, 5 mg, Oral, Q4H PRN **OR** oxyCODONE (ROXICODONE) immediate release tablet 10 mg, 10 mg, Oral, Q4H PRN  [START ON 2/19/2023] naloxegol (MOVANTIK) tablet 25 mg, 25 mg, Oral, QAM  Facility-Administered Medications Ordered in Other Encounters: sodium chloride flush 0.9 % injection 10 mL, 10 mL, IntraVENous, PRN  ROS:  Review of Systems   Constitutional: Negative. HENT: Negative. Eyes: Negative. Respiratory:  Positive for shortness of breath. Mesothelioma. Left Spontanious Pneumothorax, Rib Fx. Cardiovascular: Negative. Afib, 1 stent   Gastrointestinal: Negative. Endocrine: Negative. Genitourinary: Negative. Musculoskeletal: Negative. Skin: Negative. Allergic/Immunologic: Negative. Neurological: Negative. Hematological: Negative. Psychiatric/Behavioral: Negative. All other systems reviewed and are negative. Physical  VITALS:  /61   Pulse 85   Temp 98.4 °F (36.9 °C) (Oral)   Resp 20   Ht 5' 7\" (1.702 m)   Wt 269 lb (122 kg)   SpO2 100%   BMI 42.13 kg/m²   CONSTITUTIONAL:  awake, alert, cooperative, no apparent distress, and appears stated age  EYES:  Lids and lashes normal, pupils equal, round and reactive to light, extra ocular muscles intact, sclera clear, conjunctiva normal  ENT:  Normocephalic, without obvious abnormality, atraumatic, sinuses nontender on palpation, external ears without lesions, oral pharynx with moist mucus membranes, tonsils without erythema or exudates, gums normal and good dentition. NECK:  Supple, symmetrical, trachea midline, no adenopathy, thyroid symmetric, not enlarged and no tenderness, skin normal  HEMATOLOGIC/LYMPHATICS:  no cervical lymphadenopathy and no supraclavicular lymphadenopathy  BACK:  Symmetric, no curvature, spinous processes are non-tender on palpation, paraspinous muscles are non-tender on palpation, no costal vertebral tenderness and Surgery scar evedent.   LUNGS:  no increased work of breathing, good air exchange, no retractions, clear to auscultation, and normal percussion bilaterally  CARDIOVASCULAR:  Normal apical impulse, regular rate and rhythm, normal S1 and S2, no S3 or S4, and no murmur noted  ABDOMEN:  no scars, normal bowel sounds, firm, distended, and non-tender  MUSCULOSKELETAL:  There is no redness, warmth, or swelling of the joints.  Full range of motion noted.  Motor strength is 5 out of 5 all extremities bilaterally.  Tone is normal.  NEUROLOGIC:  Awake, alert, oriented to name, place and time.  Cranial nerves II-XII are grossly intact.  Motor is 5 out of 5 bilaterally.  Cerebellar finger to nose, heel to shin intact.  Sensory is intact.  Babinski down going, Romberg negative, and gait is normal.  SKIN:  no bruising or bleeding  Data  CBC:   Lab Results   Component Value Date/Time    WBC 12.2 02/18/2023 05:08 AM    RBC 2.75 02/18/2023 05:08 AM    HGB 8.4 02/18/2023 05:08 AM    HCT 25.1 02/18/2023 05:08 AM    MCV 91.2 02/18/2023 05:08 AM    MCH 30.7 02/18/2023 05:08 AM    MCHC 33.7 02/18/2023 05:08 AM    RDW 15.7 02/18/2023 05:08 AM     02/18/2023 05:08 AM    MPV 8.2 11/01/2013 05:36 AM     CMP:    Lab Results   Component Value Date/Time     02/18/2023 05:08 AM    K 5.2 02/18/2023 05:08 AM     02/18/2023 05:08 AM    CO2 22 02/18/2023 05:08 AM    BUN 31 02/18/2023 05:08 AM    CREATININE 1.95 02/18/2023 05:08 AM    GFRAA >60.0 02/02/2021 03:50 PM    LABGLOM 33.0 02/18/2023 05:08 AM    GLUCOSE 131 02/18/2023 05:08 AM    GLUCOSE 85 04/25/2012 01:30 PM    PROT 6.9 05/02/2019 06:01 PM    LABALBU 3.8 05/02/2019 06:01 PM    LABALBU 4.5 04/25/2012 01:30 PM    CALCIUM 9.0 02/18/2023 05:08 AM    BILITOT 0.4 05/02/2019 06:01 PM    ALKPHOS 56 05/02/2019 06:01 PM    AST 23 05/02/2019 06:01 PM    ALT 23 05/02/2019 06:01 PM       ASSESSMENT AND PLAN    Possible Irriitation of Diaphragm vs small pneumo will current current meds and follow.

## 2023-02-18 NOTE — PROGRESS NOTES
Hospitalist Progress Note      PCP: Matthew Kan DO    Date of Admission: 2/18/2023    Chief Complaint:    No chief complaint on file. Subjective:  Patient denies fevers, chills, sweats, CP, SOB, diarrhea or burning micturition. 12 point ROS negative other than mentioned above     Medications:  Reviewed    Infusion Medications   Scheduled Medications    meropenem  1,000 mg IntraVENous Q8H    melatonin  5 mg Oral Nightly    guaiFENesin  600 mg Oral BID    vancomycin (VANCOCIN) intermittent dosing (placeholder)   Other RX Placeholder    [START ON 2/19/2023] vancomycin  750 mg IntraVENous Q24H     PRN Meds: ondansetron **OR** ondansetron, ipratropium-albuterol, oxyCODONE      Intake/Output Summary (Last 24 hours) at 2/18/2023 1445  Last data filed at 2/18/2023 1149  Gross per 24 hour   Intake --   Output 2100 ml   Net -2100 ml       Exam:    /61   Pulse 85   Temp 98.4 °F (36.9 °C) (Oral)   Resp 20   Ht 5' 7\" (1.702 m)   Wt 269 lb (122 kg)   SpO2 100%   BMI 42.13 kg/m²     General appearance: No apparent distress, appears stated age and cooperative. HEENT:  Conjunctivae/corneas clear. Neck: Trachea midline. Respiratory:  Normal respiratory effort. Clear to auscultation  Cardiovascular: Regular rate and rhythm  Abdomen: Soft, non-tender, non-distended with normal bowel sounds. Musculoskeletal: No clubbing, cyanosis or edema bilaterally  Neuro: Non Focal.   Capillary Refill: Brisk,< 3 seconds   Peripheral Pulses: +2 palpable, equal bilaterally     Labs:   Recent Labs     02/18/23  0508   WBC 12.2*   HGB 8.4*   HCT 25.1*        Recent Labs     02/16/23  0636 02/18/23  0508    137   K 4.9 5.2*   * 103   CO2 23 22   BUN 32* 31*   CREATININE 1.40* 1.95*   CALCIUM 9.1 9.0     No results for input(s): AST, ALT, BILIDIR, BILITOT, ALKPHOS in the last 72 hours.   Recent Labs     02/16/23  0615 02/16/23  1115 02/18/23  0508   INR 1.3 1.3 1.4     No results for input(s): Deandra Chen in the last 72 hours. Urinalysis:    No results found for: Julious Gaw, BACTERIA, RBCUA, BLOODU, SPECGRAV, [address]    Radiology:  XR CHEST PORTABLE   Final Result   1. Near complete whiteout of the left hemithorax likely represent a   combination of partial collapse of the left lung and large pleural effusion   2. Cardiomegaly   3. The right lung is grossly clear. XR ABDOMEN (KUB) (SINGLE AP VIEW)   Final Result   1. Complete opacification of left hemithorax compatible with some   combination of pleural effusion and atelectasis. Secondary obscuration of   left heart border. 2.  Nonobstructive bowel gas pattern. No acute abdominal disease identified. 3.  Probable left renal stone. 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 Continue metoprolol for rate control. Chronic pain with spinal stimulator: Consult to pain management   Sick sinus syndrome status post pacemaker: Continue beta-blocker  SCOTTY escalate to BiPAP before surgery. 5 L bleed in O2  Polycythemia: Follow CBC. Stat CBC now    Active Hospital Problems    Diagnosis Date Noted    Pleural effusion [J90] 02/18/2023     Priority: Medium        Additional work up or/and treatment plan may be added today or then after based on clinical progression. I am managing a portion of pt care. Some medical issues are handled by other specialists. Additional work up and treatment should be done in out pt setting by pt PCP and other out pt providers. In addition to examining and evaluating pt, I spent additional time explaining care, normal and abnormal findings, and treatment plan. All of pt questions were answered. Counseling, diet and education were  provided.  Case will be discussed with nursing staff when appropriate. Family will be updated if and when appropriate. Diet: ADULT DIET;  Regular    Code Status: Full Code    PT/OT Eval     Electronically signed by Drew Nick MD on 2/18/2023 at 2:45 PM

## 2023-02-18 NOTE — PROGRESS NOTES
[facility] AT [place] Respiratory Therapy Evaluation   Current Order:  Duoneb Q4 PRN      Home Regimen: None      Ordering Physician: Harmony  Re-evaluation Date:  ---     Diagnosis: Pleural effusion      Patient Status: Stable / Unstable + Physician notified    The following MDI Criteria must be met in order to convert aerosol to MDI with spacer. If unable to meet, MDI will be converted to aerosol:  []  Patient able to demonstrate the ability to use MDI effectively  []  Patient alert and cooperative  []  Patient able to take deep breath with 5-10 second hold  []  Medication(s) available in this delivery method   []  Peak flow greater than or equal to 200 ml/min            Current Order Substituted To  (same drug, same frequency)   Aerosol to MDI [] Albuterol Sulfate 0.083% unit dose by aerosol Albuterol Sulfate MDI 2 puffs by inhalation with spacer    [] Levalbuterol 1.25 mg unit dose by aerosol Levalbuterol MDI 2 puffs by inhalation with spacer    [] Levalbuterol 0.63 mg unit dose by aerosol Levalbuterol MDI 2 puffs by inhalation with spacer    [] Ipratropium Bromide 0.02% unit dose by aerosol Ipratropium Bromide MDI 2 puffs by inhalation with spacer    [] Duoneb (Ipratropium + Albuterol) unit dose by aerosol Ipratropium MDI + Albuterol MDI 2 puffs by inhalation w/spacer   MDI to Aerosol [] Albuterol Sulfate MDI Albuterol Sulfate 0.083% unit dose by aerosol    [] Levalbuterol MDI 2 puffs by inhalation Levalbuterol 1.25 mg unit dose by aerosol    [] Ipratropium Bromide MDI by inhalation Ipratropium Bromide 0.02% unit dose by aerosol    [] Combivent (Ipratropium + Albuterol) MDI by inhalation Duoneb (Ipratropium + Albuterol) unit dose by aerosol       Treatment Assessment [Frequency/Schedule]:  Change frequency to: _________No Changes_________________________________________per Protocol, P&T, MEC      Points 0 1 2 3 4   Pulmonary Status  Non-Smoker  []   Smoking history   < 20 pack years  [x]   Smoking history  ?  20 pack years  []   Pulmonary Disorder  (acute or chronic)  []   Severe or Chronic w/ Exacerbation  []     Surgical Status No [x]   Surgeries     General []   Surgery Lower []   Abdominal Thoracic or []   Upper Abdominal Thoracic with  PulmonaryDisorder  []     Chest X-ray Clear/Not  Ordered     []  Chronic Changes  Results Pending  [x]  Infiltrates, atelectasis, pleural effusion, or edema  []  Infiltrates in more than one lobe []  Infiltrate + Atelectasis, &/or pleural effusion  []    Respiratory Pattern Regular,  RR = 12-20 []  Increased,  RR = 21-25 [x]  PRIETO, irregular,  or RR = 26-30 []  Decreased FEV1  or RR = 31-35 []  Severe SOB, use  of accessory muscles, or RR ? 35  []    Mental Status Alert, oriented,  Cooperative [x]  Confused but Follows commands []  Lethargic or unable to follow commands []  Obtunded  []  Comatose  []    Breath Sounds Clear to  auscultation  []  Decreased unilaterally or  in bases only [x]  Decreased  bilaterally  []  Crackles or intermittent wheezes []  Wheezes []    Cough Strong, Spontan., & nonproductive [x]  Strong,  spontaneous, &  productive []  Weak,  Nonproductive []  Weak, productive or  with wheezes []  No spontaneous  cough or may require suctioning []    Level of Activity Ambulatory [x]  Ambulatory w/ Assist  []  Non-ambulatory []  Paraplegic []  Quadriplegic []    Total    Score:___4____     Triage Score:___5_____      Tri       Triage:     1. (>20) Freq: Q3    2. (16-20) Freq: Q4   3. (11-15) Freq: QID & Albuterol Q2 PRN    4. (6-10) Freq: TID & Albuterol Q2 PRN    5. (0-5) Freq Q4prn

## 2023-02-19 ENCOUNTER — APPOINTMENT (OUTPATIENT)
Dept: GENERAL RADIOLOGY | Age: 88
DRG: 186 | End: 2023-02-19
Attending: INTERNAL MEDICINE
Payer: MEDICARE

## 2023-02-19 LAB
ALBUMIN SERPL-MCNC: 3.1 G/DL (ref 3.5–4.6)
ALP BLD-CCNC: 73 U/L (ref 35–104)
ALT SERPL-CCNC: 12 U/L (ref 0–41)
ANION GAP SERPL CALCULATED.3IONS-SCNC: 14 MEQ/L (ref 9–15)
ANISOCYTOSIS: ABNORMAL
APPEARANCE FLUID: NORMAL
APPEARANCE FLUID: NORMAL
AST SERPL-CCNC: 27 U/L (ref 0–40)
BACTERIA: NEGATIVE /HPF
BANDED NEUTROPHILS RELATIVE PERCENT: 1 %
BASOPHILS ABSOLUTE: 0 K/UL (ref 0–0.2)
BASOPHILS RELATIVE PERCENT: 0.3 %
BILIRUB SERPL-MCNC: 0.3 MG/DL (ref 0.2–0.7)
BILIRUBIN DIRECT: <0.2 MG/DL (ref 0–0.4)
BILIRUBIN URINE: NEGATIVE
BILIRUBIN, INDIRECT: ABNORMAL MG/DL (ref 0–0.6)
BLOOD, URINE: ABNORMAL
BUN BLDV-MCNC: 36 MG/DL (ref 8–23)
CALCIUM SERPL-MCNC: 8.7 MG/DL (ref 8.5–9.9)
CELL COUNT FLUID TYPE: NORMAL
CHLORIDE BLD-SCNC: 106 MEQ/L (ref 95–107)
CLARITY: CLEAR
CLOT EVALUATION: NORMAL
CO2: 19 MEQ/L (ref 20–31)
COLOR FLUID: NORMAL
COLOR: YELLOW
CREAT SERPL-MCNC: 1.58 MG/DL (ref 0.7–1.2)
CREATININE URINE: 276 MG/DL
EOSINOPHIL FLUID: 3 %
EOSINOPHILS ABSOLUTE: 0.2 K/UL (ref 0–0.7)
EOSINOPHILS RELATIVE PERCENT: 2 %
EPITHELIAL CELLS, UA: ABNORMAL /HPF (ref 0–5)
FERRITIN: 129 NG/ML (ref 30–400)
FLUID TYPE: NORMAL
GFR SERPL CREATININE-BSD FRML MDRD: 42.4 ML/MIN/{1.73_M2}
GLUCOSE BLD-MCNC: 121 MG/DL (ref 70–99)
GLUCOSE URINE: NEGATIVE MG/DL
GLUCOSE, FLUID: 101 MG/DL
HCT VFR BLD CALC: 25.8 % (ref 42–52)
HEMOGLOBIN: 8.4 G/DL (ref 14–18)
HYALINE CASTS: ABNORMAL /HPF (ref 0–5)
IRON SATURATION: 9 % (ref 20–55)
IRON: 24 UG/DL (ref 59–158)
KETONES, URINE: ABNORMAL MG/DL
LEUKOCYTE ESTERASE, URINE: NEGATIVE
LYMPHOCYTES ABSOLUTE: 0.4 K/UL (ref 1–4.8)
LYMPHOCYTES RELATIVE PERCENT: 4 %
LYMPHOCYTES, BODY FLUID: 1 %
MCH RBC QN AUTO: 29.6 PG (ref 27–31.3)
MCHC RBC AUTO-ENTMCNC: 32.6 % (ref 33–37)
MCV RBC AUTO: 90.7 FL (ref 79–92.2)
MICROALBUMIN UR-MCNC: 1.6 MG/DL
MICROALBUMIN/CREAT UR-RTO: 5.8 MG/G (ref 0–30)
MONOCYTE, FLUID: 4 %
MONOCYTES ABSOLUTE: 0.4 K/UL (ref 0.2–0.8)
MONOCYTES RELATIVE PERCENT: 4.4 %
NEUTROPHIL, FLUID: 92 %
NEUTROPHILS ABSOLUTE: 9.6 K/UL (ref 1.4–6.5)
NEUTROPHILS RELATIVE PERCENT: 90 %
NITRITE, URINE: NEGATIVE
NUCLEATED CELLS FLUID: 107 /CUMM
NUMBER OF CELLS COUNTED FLUID: 100
OVALOCYTES: ABNORMAL
PDW BLD-RTO: 15.7 % (ref 11.5–14.5)
PH UA: 5 (ref 5–9)
PLATELET # BLD: 213 K/UL (ref 130–400)
PLATELET SLIDE REVIEW: NORMAL
POIKILOCYTES: ABNORMAL
POLYCHROMASIA: ABNORMAL
POTASSIUM REFLEX MAGNESIUM: 4.5 MEQ/L (ref 3.4–4.9)
PROCALCITONIN: 0.09 NG/ML (ref 0–0.15)
PROTEIN FLUID: 2.8 G/DL
PROTEIN UA: 30 MG/DL
RBC # BLD: 2.84 M/UL (ref 4.7–6.1)
RBC FLUID: NORMAL /CUMM
RBC UA: ABNORMAL /HPF (ref 0–5)
RETICULOCYTE ABSOLUTE COUNT: 0.11 M/CUMM (ref 0.02–0.11)
RETICULOCYTE COUNT PCT: 4 % (ref 0.6–2.2)
SODIUM BLD-SCNC: 139 MEQ/L (ref 135–144)
SPECIFIC GRAVITY UA: 1.03 (ref 1–1.03)
TOTAL IRON BINDING CAPACITY: 259 UG/DL (ref 250–450)
TOTAL PROTEIN: 5.7 G/DL (ref 6.3–8)
UNSATURATED IRON BINDING CAPACITY: 235 UG/DL (ref 112–347)
UROBILINOGEN, URINE: 0.2 E.U./DL
WBC # BLD: 10.5 K/UL (ref 4.8–10.8)
WBC UA: ABNORMAL /HPF (ref 0–5)

## 2023-02-19 PROCEDURE — 94660 CPAP INITIATION&MGMT: CPT

## 2023-02-19 PROCEDURE — 83550 IRON BINDING TEST: CPT

## 2023-02-19 PROCEDURE — A4216 STERILE WATER/SALINE, 10 ML: HCPCS | Performed by: INTERNAL MEDICINE

## 2023-02-19 PROCEDURE — 6360000002 HC RX W HCPCS: Performed by: INTERNAL MEDICINE

## 2023-02-19 PROCEDURE — 82607 VITAMIN B-12: CPT

## 2023-02-19 PROCEDURE — 82746 ASSAY OF FOLIC ACID SERUM: CPT

## 2023-02-19 PROCEDURE — 2580000003 HC RX 258: Performed by: INTERNAL MEDICINE

## 2023-02-19 PROCEDURE — C9113 INJ PANTOPRAZOLE SODIUM, VIA: HCPCS | Performed by: INTERNAL MEDICINE

## 2023-02-19 PROCEDURE — 82043 UR ALBUMIN QUANTITATIVE: CPT

## 2023-02-19 PROCEDURE — 83615 LACTATE (LD) (LDH) ENZYME: CPT

## 2023-02-19 PROCEDURE — 87186 SC STD MICRODIL/AGAR DIL: CPT

## 2023-02-19 PROCEDURE — 84157 ASSAY OF PROTEIN OTHER: CPT

## 2023-02-19 PROCEDURE — 6370000000 HC RX 637 (ALT 250 FOR IP): Performed by: INTERNAL MEDICINE

## 2023-02-19 PROCEDURE — 1210000000 HC MED SURG R&B

## 2023-02-19 PROCEDURE — 36415 COLL VENOUS BLD VENIPUNCTURE: CPT

## 2023-02-19 PROCEDURE — 84145 PROCALCITONIN (PCT): CPT

## 2023-02-19 PROCEDURE — 99232 SBSQ HOSP IP/OBS MODERATE 35: CPT | Performed by: INTERNAL MEDICINE

## 2023-02-19 PROCEDURE — 80076 HEPATIC FUNCTION PANEL: CPT

## 2023-02-19 PROCEDURE — 82570 ASSAY OF URINE CREATININE: CPT

## 2023-02-19 PROCEDURE — 2700000000 HC OXYGEN THERAPY PER DAY

## 2023-02-19 PROCEDURE — 71045 X-RAY EXAM CHEST 1 VIEW: CPT

## 2023-02-19 PROCEDURE — 88112 CYTOPATH CELL ENHANCE TECH: CPT

## 2023-02-19 PROCEDURE — 82945 GLUCOSE OTHER FLUID: CPT

## 2023-02-19 PROCEDURE — 86403 PARTICLE AGGLUT ANTBDY SCRN: CPT

## 2023-02-19 PROCEDURE — 87205 SMEAR GRAM STAIN: CPT

## 2023-02-19 PROCEDURE — 87070 CULTURE OTHR SPECIMN AEROBIC: CPT

## 2023-02-19 PROCEDURE — 88305 TISSUE EXAM BY PATHOLOGIST: CPT

## 2023-02-19 PROCEDURE — 89051 BODY FLUID CELL COUNT: CPT

## 2023-02-19 PROCEDURE — 81001 URINALYSIS AUTO W/SCOPE: CPT

## 2023-02-19 PROCEDURE — 82728 ASSAY OF FERRITIN: CPT

## 2023-02-19 PROCEDURE — 85046 RETICYTE/HGB CONCENTRATE: CPT

## 2023-02-19 PROCEDURE — 80048 BASIC METABOLIC PNL TOTAL CA: CPT

## 2023-02-19 PROCEDURE — 83540 ASSAY OF IRON: CPT

## 2023-02-19 PROCEDURE — 85025 COMPLETE CBC W/AUTO DIFF WBC: CPT

## 2023-02-19 PROCEDURE — 87077 CULTURE AEROBIC IDENTIFY: CPT

## 2023-02-19 RX ORDER — LOSARTAN POTASSIUM 50 MG/1
100 TABLET ORAL DAILY
Status: DISCONTINUED | OUTPATIENT
Start: 2023-02-19 | End: 2023-02-25

## 2023-02-19 RX ORDER — ASPIRIN 81 MG/1
81 TABLET ORAL DAILY
Status: DISCONTINUED | OUTPATIENT
Start: 2023-02-19 | End: 2023-02-25 | Stop reason: HOSPADM

## 2023-02-19 RX ORDER — ROSUVASTATIN CALCIUM 10 MG/1
10 TABLET, COATED ORAL DAILY
Status: DISCONTINUED | OUTPATIENT
Start: 2023-02-19 | End: 2023-02-25 | Stop reason: HOSPADM

## 2023-02-19 RX ORDER — FUROSEMIDE 20 MG/1
20 TABLET ORAL DAILY
Status: DISCONTINUED | OUTPATIENT
Start: 2023-02-19 | End: 2023-02-25 | Stop reason: HOSPADM

## 2023-02-19 RX ADMIN — ROSUVASTATIN CALCIUM 10 MG: 10 TABLET, FILM COATED ORAL at 08:52

## 2023-02-19 RX ADMIN — MEROPENEM 1000 MG: 1 INJECTION, POWDER, FOR SOLUTION INTRAVENOUS at 05:35

## 2023-02-19 RX ADMIN — SODIUM CHLORIDE, PRESERVATIVE FREE 40 MG: 5 INJECTION INTRAVENOUS at 16:38

## 2023-02-19 RX ADMIN — MEROPENEM 1000 MG: 1 INJECTION, POWDER, FOR SOLUTION INTRAVENOUS at 23:29

## 2023-02-19 RX ADMIN — VANCOMYCIN HYDROCHLORIDE 750 MG: 750 INJECTION, POWDER, LYOPHILIZED, FOR SOLUTION INTRAVENOUS at 09:07

## 2023-02-19 RX ADMIN — METOPROLOL TARTRATE 25 MG: 25 TABLET, FILM COATED ORAL at 22:30

## 2023-02-19 RX ADMIN — METOPROLOL TARTRATE 25 MG: 25 TABLET, FILM COATED ORAL at 08:52

## 2023-02-19 RX ADMIN — Medication 5 MG: at 22:30

## 2023-02-19 RX ADMIN — GUAIFENESIN 600 MG: 600 TABLET ORAL at 08:52

## 2023-02-19 RX ADMIN — NALOXEGOL OXALATE 25 MG: 12.5 TABLET, FILM COATED ORAL at 09:02

## 2023-02-19 RX ADMIN — MEROPENEM 1000 MG: 1 INJECTION, POWDER, FOR SOLUTION INTRAVENOUS at 16:38

## 2023-02-19 RX ADMIN — GUAIFENESIN 600 MG: 600 TABLET ORAL at 22:30

## 2023-02-19 NOTE — PROGRESS NOTES
Hospitalist Progress Note      PCP: Matthew Kan DO    Date of Admission: 2/18/2023    Chief Complaint:    No chief complaint on file. Subjective:  Patient denies fevers, chills, sweats, CP, SOB, diarrhea or burning micturition. Had large BM mixed of brown stools and some dark stool . [facility] 12 point ROS negative other than mentioned above     Medications:  Reviewed    Infusion Medications   Scheduled Medications    metoprolol tartrate  25 mg Oral BID    rosuvastatin  10 mg Oral Daily    [Held by provider] losartan  100 mg Oral Daily    [Held by provider] furosemide  20 mg Oral Daily    [Held by provider] aspirin  81 mg Oral Daily    pantoprazole (PROTONIX) 40 mg injection  40 mg IntraVENous Q12H    meropenem  1,000 mg IntraVENous Q8H    melatonin  5 mg Oral Nightly    guaiFENesin  600 mg Oral BID    vancomycin (VANCOCIN) intermittent dosing (placeholder)   Other RX Placeholder    vancomycin  750 mg IntraVENous Q24H    naloxegol  25 mg Oral QAM     PRN Meds: ondansetron **OR** ondansetron, ipratropium-albuterol, oxyCODONE **OR** oxyCODONE      Intake/Output Summary (Last 24 hours) at 2/19/2023 1509  Last data filed at 2/18/2023 1900  Gross per 24 hour   Intake --   Output 1150 ml   Net -1150 ml         Exam:    /64   Pulse (!) 102   Temp 97.7 °F (36.5 °C) (Oral)   Resp 22   Ht 5' 7\" (1.702 m)   Wt 269 lb (122 kg)   SpO2 94%   BMI 42.13 kg/m²     General appearance: No apparent distress, appears stated age and cooperative. HEENT:  Conjunctivae/corneas clear. Neck: Trachea midline. Respiratory:  Normal respiratory effort. Clear to auscultation  Cardiovascular: Regular rate and rhythm  Abdomen: Soft, non-tender, non-distended with normal bowel sounds.   Musculoskeletal: No clubbing, cyanosis or edema bilaterally  Neuro: Non Focal.   Capillary Refill: Brisk,< 3 seconds   Peripheral Pulses: +2 palpable, equal bilaterally     Labs:   Recent Labs     02/18/23  0508 02/19/23  0527   WBC 12.2* 10.5   HGB 8.4* 8.4*   HCT 25.1* 25.8*    213       Recent Labs     02/18/23  0508 02/19/23  0527    139   K 5.2* 4.5    106   CO2 22 19*   BUN 31* 36*   CREATININE 1.95* 1.58*   CALCIUM 9.0 8.7       Recent Labs     02/19/23  0527   AST 27   ALT 12   BILIDIR <0.2   BILITOT 0.3   ALKPHOS 73     Recent Labs     02/18/23  0508   INR 1.4       No results for input(s): Carroll Person in the last 72 hours. Urinalysis:    No results found for: Vidal Fabry, BACTERIA, RBCUA, BLOODU, SPECGRAV, [address]    Radiology:  XR CHEST PORTABLE   Final Result   1. Minimal partial clearing of the left lung. The previously noted left   pleural effusion is smaller   2. Stable position of the left chest tube   3. The right lung is clear. XR CHEST PORTABLE   Final Result   1. Apparent interval placement of left chest tube catheter. No pneumothorax. 2.  Persistent opacification of the left hemithorax. XR CHEST PORTABLE   Final Result   1. Near complete whiteout of the left hemithorax likely represent a   combination of partial collapse of the left lung and large pleural effusion   2. Cardiomegaly   3. The right lung is grossly clear. XR ABDOMEN (KUB) (SINGLE AP VIEW)   Final Result   1. Complete opacification of left hemithorax compatible with some   combination of pleural effusion and atelectasis. Secondary obscuration of   left heart border. 2.  Nonobstructive bowel gas pattern. No acute abdominal disease identified. 3.  Probable left renal stone. Assessment/Plan:    Loculated pleural effusion: Chest tube in place; possibly related to mesothelioma; pain regimen is doing well now  Mesothelioma: Seen by oncology   Constipation: Resolved; continue movantik  History of melana:   At home prior to admission; stools appear non melanotic now but family requesting GI evaluation which is reasonable; GI consulted; clear liquid diet; IV PPI   atrial fibrillation: Hold warfarin for now. Continue metoprolol for rate control. Chronic pain with spinal stimulator: Consult to pain management   Sick sinus syndrome status post pacemaker: Continue beta-blocker  SCOTTY escalate to BiPAP before surgery. 5 L bleed in O2  Polycythemia: Follow CBC. Stat CBC now    Active Hospital Problems    Diagnosis Date Noted    Pleural effusion [J90] 02/18/2023     Priority: Medium        Additional work up or/and treatment plan may be added today or then after based on clinical progression. I am managing a portion of pt care. Some medical issues are handled by other specialists. Additional work up and treatment should be done in out pt setting by pt PCP and other out pt providers. In addition to examining and evaluating pt, I spent additional time explaining care, normal and abnormal findings, and treatment plan. All of pt questions were answered. Counseling, diet and education were  provided. Case will be discussed with nursing staff when appropriate. Family will be updated if and when appropriate. Diet: ADULT DIET;  Clear Liquid    Code Status: Full Code    PT/OT Eval     Electronically signed by Murali Fuentes MD on 2/19/2023 at 3:09 PM

## 2023-02-19 NOTE — PROGRESS NOTES
INPATIENT PROGRESS NOTES    PATIENT NAME: Jermain Stewart  MRN: [id number]  SERVICE DATE:  2023   SERVICE TIME:  12:48 PM      PRIMARY SERVICE: Pulmonary Disease    CHIEF COMPLAINTS: Pleural effusion    INTERVAL HPI: Patient seen and examined at bedside, Interval Notes, orders reviewed. Nursing notes noted        New information updated in the note today, rest of the examination did not change compared to yesterday. Patient report feels better, shortness of breath improved, no chest pain, no coughing, used CPAP overnight, no nausea no vomiting, chest tube drained 3.2 L,    Review of system:     GI Abdominal pain No  Skin Rash No    Social History     Tobacco Use    Smoking status: Former     Packs/day: 0.50     Years: 10.00     Pack years: 5.00     Types: Cigarettes     Quit date: 1968     Years since quittin.8    Smokeless tobacco: Never   Substance Use Topics    Alcohol use: Yes     Comment: social         Problem Relation Age of Onset    Heart Attack Mother     Other Mother          in MVA    Other Father          at age 80    Other Sister          as infant    Cancer Brother     Other Brother         unknown medical hx    Other Brother          at age 61 due to accident    Cancer Daughter         colon & lung cancer    Colon Cancer Daughter     No Known Problems Son     Colon Cancer Other     Breast Cancer Other          OBJECTIVE    Body mass index is 42.13 kg/m². PHYSICAL EXAM:  Vitals:  /64   Pulse (!) 102   Temp 97.7 °F (36.5 °C) (Oral)   Resp 22   Ht 5' 7\" (1.702 m)   Wt 269 lb (122 kg)   SpO2 94%   BMI 42.13 kg/m²     General: alert, cooperative, no distress  Head: normocephalic, atraumatic  Eyes:No gross abnormalities. ENT:  MMM no lesions  Neck:  supple and no masses  Chest : Few rhonchi bilaterally, good air movement, no wheezing, nontender, tympanic  Heart[de-identified] Heart sounds are normal.  Regular rate and rhythm without murmur, gallop or rub.   ABD: symmetric, soft, non-tender, no guarding or rebound  Musculoskeletal : no cyanosis, no clubbing, and no edema  Neuro:  Grossly normal  Skin: No rashes or nodules noted. Lymph node:  no cervical nodes  Urology: No Horan   Psychiatric: appropriate    DATA:   Recent Labs     02/18/23  0508 02/19/23 0527   WBC 12.2* 10.5   HGB 8.4* 8.4*   HCT 25.1* 25.8*   MCV 91.2 90.7    213     Recent Labs     02/18/23  0508 02/19/23  0527    139   K 5.2* 4.5    106   CO2 22 19*   BUN 31* 36*   CREATININE 1.95* 1.58*   GLUCOSE 131* 121*   CALCIUM 9.0 8.7   PROT  --  5.7*   LABALBU  --  3.1*   BILITOT  --  0.3   ALKPHOS  --  73   AST  --  27   ALT  --  12   LABGLOM 33.0* 42.4*       MV Settings:          No results for input(s): PHART, LGG6XZG, PO2ART, KVE4WZI, BEART, U6LMWZXE in the last 72 hours. O2 Device: Nasal cannula  O2 Flow Rate (L/min): 4 L/min    ADULT DIET;  Regular     MEDICATIONS during current hospitalization:    Continuous Infusions:      Scheduled Meds:   metoprolol tartrate  25 mg Oral BID    rosuvastatin  10 mg Oral Daily    [Held by provider] losartan  100 mg Oral Daily    [Held by provider] furosemide  20 mg Oral Daily    [Held by provider] aspirin  81 mg Oral Daily    meropenem  1,000 mg IntraVENous Q8H    melatonin  5 mg Oral Nightly    guaiFENesin  600 mg Oral BID    vancomycin (VANCOCIN) intermittent dosing (placeholder)   Other RX Placeholder    vancomycin  750 mg IntraVENous Q24H    naloxegol  25 mg Oral QAM       PRN Meds:ondansetron **OR** ondansetron, ipratropium-albuterol, oxyCODONE **OR** oxyCODONE    Radiology  XR ABDOMEN (KUB) (SINGLE AP VIEW)    Result Date: 2/18/2023  EXAMINATION: FOUR SUPINE XRAY VIEW(S) OF THE ABDOMEN 2/18/2023 7:02 am COMPARISON: CHEST X-RAY 02/18/2023 HISTORY: ORDERING SYSTEM PROVIDED HISTORY: abd distentnion TECHNOLOGIST PROVIDED HISTORY: Reason for exam:->abd distentnion What reading provider will be dictating this exam?->CRC FINDINGS: Complete opacification of left hemithorax compatible with some combination of pleural effusion and atelectasis. Obscuration of the left heart border. Pericardial effusion not excluded. Dual chamber transvenous pacemaker in place. The lower pelvis was not imaged. Nonobstructive bowel gas pattern. The stomach, small bowel, and colon are nondilated. 9 mm calcification compatible with a stone at the midpole of the left kidney. Degenerative changes of the lumbar spine. 1.  Complete opacification of left hemithorax compatible with some combination of pleural effusion and atelectasis. Secondary obscuration of left heart border. 2.  Nonobstructive bowel gas pattern. No acute abdominal disease identified. 3.  Probable left renal stone. XR CHEST PORTABLE    Result Date: 2/19/2023  EXAMINATION: ONE XRAY VIEW OF THE CHEST 2/19/2023 7:51 am COMPARISON: 02/18/2023 HISTORY: ORDERING SYSTEM PROVIDED HISTORY: pleural effusion TECHNOLOGIST PROVIDED HISTORY: Reason for exam:->pleural effusion What reading provider will be dictating this exam?->CRC FINDINGS: Note is made of a left chest tube. There is no left pneumothorax. There is been partial clearing of the previously noted left pleural effusion. Significant residual airspace disease as well as a residual pleural effusion is noted The right lung is clear. There is a dual lead cardiac pacer on the left. 1. Minimal partial clearing of the left lung. The previously noted left pleural effusion is smaller 2. Stable position of the left chest tube 3. The right lung is clear. XR CHEST PORTABLE    Result Date: 2/18/2023  EXAMINATION: ONE XRAY VIEW OF THE CHEST 2/18/2023 11:11 am COMPARISON: None. HISTORY: ORDERING SYSTEM PROVIDED HISTORY: chest tube placement TECHNOLOGIST PROVIDED HISTORY: Reason for exam:->chest tube placement What reading provider will be dictating this exam?->CRC FINDINGS: There is opacification of the left hemithorax. No evidence of pneumothorax.  Air bronchograms are noted in the perihilar region. Since the prior study there appears to been placement of a left-sided chest tube with its tip near the left upper chest apex. Right-sided dual lead pacemaker is unchanged. Heart appears enlarged. Mild reticular opacities in the right lung are unchanged. Stable degenerative changes in the left shoulder and AC joint. 1.  Apparent interval placement of left chest tube catheter. No pneumothorax. 2.  Persistent opacification of the left hemithorax. XR CHEST PORTABLE    Result Date: 2/18/2023  EXAMINATION: ONE XRAY VIEW OF THE CHEST 2/18/2023 7:02 am COMPARISON: There are no prior recent studies available for review HISTORY: ORDERING SYSTEM PROVIDED HISTORY: loculated pleural effusion TECHNOLOGIST PROVIDED HISTORY: Reason for exam:->loculated pleural effusion What reading provider will be dictating this exam?->CRC FINDINGS: The cardiac silhouette appears enlarged. Please note the left heart border is obscured due to the left lung pathology. There is near complete whiteout of the left hemithorax. The finding could represent a combination of partial collapse of the left lung and large pleural effusion. There is no right lung infiltrate or right pleural effusion. 1. Near complete whiteout of the left hemithorax likely represent a combination of partial collapse of the left lung and large pleural effusion 2. Cardiomegaly 3. The right lung is grossly clear. FLUORO FOR SURGICAL PROCEDURES    Result Date: 2/16/2023  EXAMINATION: SPOT FLUOROSCOPIC IMAGES 2/16/2023 6:54 am TECHNIQUE: Fluoroscopy was provided by the radiology department for procedure. Radiologist was not present during examination. FLUOROSCOPY DOSE AND TYPE: Radiation Exposure Index: Fluoroscopy time equals 56.5 seconds.   Total dose equals 43.68 mGy, COMPARISON: None HISTORY: ORDERING SYSTEM PROVIDED HISTORY: pain TECHNOLOGIST PROVIDED HISTORY: Reason for exam:->pain What reading provider will be dictating this exam?->CRC Intraprocedural imaging. FINDINGS: 12 spot images of the lumbar spine were obtained. Intraprocedural fluoroscopic spot images as above. See separate procedure report for more information.              IMPRESSION AND SUGGESTION:  Patient is at risk due to   Left suppurative effusion, bloody, likely due to mesothelioma, status post Pleurx  Worsening shortness of breath secondary to #1, improved after chest tube placement  SCOTTY  Heart failure  A-fib, on Coumadin at baseline currently Coumadin on hold     Recommendation  Chest tube drainage to waterseal  Pleural fluid for chemistry, culture, and cytology  O2 to keep sat 90 to 92%  Continue home CPAP  Continue empiric antibiotic       Electronically signed by Henrietta Gomez MD,  FCCP   on 2/19/2023 at 12:48 PM

## 2023-02-19 NOTE — CONSULTS
[facility] Nephrology  Consult Note           Reason for Consult:  ZHEN  Requesting Physician:  Dr. Orosco    Chief Complaint:  loculated pleural effusion  History Obtained From:  patient, electronic medical record    History of Present Ilness:    87 y.o. male with history s/f A.fib, mesothelioma s/p radiation, TIA x2, CHF, HTN, HLD and polycythemia who was transferred to [facility] from [facility] for thoracotomy/VATS decortication due to LL collapse. Notably pt was to have nerve stimulator placement however due to elevated INR and then CSF leak procedure was aborted. Post ob found to have large loculated pleural effusion. Since admission has had placement of LT sided chest tube w/ repelat CXR showing minimal partial clearing of the LT lung. Scr was 1.4. yesterday up to 1.9 however back down to 1.5. doesn't see a nephrologist    Past Medical History:        Diagnosis Date    A-fib (HCC)     approx 1 year ago-cardioverted    Arthritis     Baker's cyst of knee, left     Cancer (HCC)     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    Cerebral artery occlusion with cerebral infarction (HCC) 2000    TIA sx felt funny --     Congestive heart failure (HCC) 03/08/2022    Coronary artery disease     HTN (hypertension)     meds > 20 yrs    Hx of blood clots 2012    DVT left leg     Hyperlipidemia     meds > 20 yrs    Pacemaker 07/19/2022    Polycythemia     Torn meniscus     left knee       Past Surgical History:        Procedure Laterality Date    BACK SURGERY      COLONOSCOPY  2009    COLONOSCOPY  2012    CORONARY ANGIOPLASTY WITH STENT PLACEMENT  10/2006    x 1 cardiac stent    ENDOSCOPY, COLON, DIAGNOSTIC      EYE SURGERY      [place] with IOL OU    HERNIA REPAIR  03/2013    redo ca mesh in Oct.2013 -- umbilical hernia    JOINT REPLACEMENT Bilateral 2009 & 2011    Bilateral TKR    KNEE SURGERY Left      arthroscopic surgery to repair meniscus of left knee    LAMINECTOMY N/A 02/08/2021    RIGHT BILATERAL L2-3 3-4 4-5 MICRODECOMPRESSION  performed by Stormy Moy MD at [facility] implanted 7/19/22    PAIN MANAGEMENT PROCEDURE N/A 2/16/2023    ATTEMPTED SPINAL CORD STIMULATOR PERMANENT PLACEMENT performed by Mayi Pearson MD at [address] N/A 1/10/2023    SPINAL CORD STIMULATOR TRIAL performed by Mayi Pearson MD at [place]  age 8    [address] 71  03/2012       Home Medications:    Current Facility-Administered Medications on File Prior to Encounter   Medication Dose Route Frequency Provider Last Rate Last Admin    sodium chloride flush 0.9 % injection 10 mL  10 mL IntraVENous PRN Francis Lynch MD   10 mL at 07/10/19 1220     Current Outpatient Medications on File Prior to Encounter   Medication Sig Dispense Refill    carboxymethylcellulose (THERATEARS) 1 % ophthalmic gel Apply to eye      melatonin 5 MG TABS tablet Take by mouth      enoxaparin (LOVENOX) 100 MG/ML Inject into the skin 2 times daily      metoprolol tartrate (LOPRESSOR) 50 MG tablet Take 25 mg by mouth 2 times daily      Elastic Bandages & Supports (MEDICAL COMPRESSION STOCKINGS) MISC 1 each by Does not apply route daily Thigh high 20-30 mmhg graduated compression stockings both legs wear daily during day and off Qhs. Wear as tolerated. Do not wear if they cause increased pain. 1 each 5    losartan (COZAAR) 100 MG tablet Take 100 mg by mouth in the morning. meloxicam (MOBIC) 15 MG tablet Take 15 mg by mouth in the morning. (Patient not taking: Reported on 2/18/2023)      potassium chloride (KLOR-CON M) 20 MEQ extended release tablet Take 20 mEq by mouth in the morning and 20 mEq before bedtime.       rosuvastatin (CRESTOR) 10 MG tablet TAKE 1 TABLET BY MOUTH EVERY DAY      furosemide (LASIX) 20 MG tablet Take 20 mg by mouth daily      warfarin (COUMADIN) 2.5 MG tablet Take 4 mg by mouth daily Indications: T-Th-Sat - Sun Daily per INR levels      nitroGLYCERIN (NITROSTAT) 0.4 MG SL tablet Place 0.4 mg under the tongue every 5 minutes as needed for Chest pain up to max of 3 total doses. If no relief after 1 dose, call 911. Omega-3 Fatty Acids (FISH OIL) 1200 MG CAPS Take by mouth daily      Cholecalciferol (VITAMIN D3) 125 MCG (5000 UT) TABS Take by mouth daily      CPAP Machine MISC by Does not apply route      warfarin (COUMADIN) 5 MG tablet Take 5 mg by mouth daily Indications: Brahrs-Cbqosjwtt-Yvpitj Daily per INR levels      aspirin 81 MG EC tablet Take 81 mg by mouth daily.            Allergies:  Benadryl [diphenhydramine hcl] and Diphenhydramine    Social History:    Social History     Socioeconomic History    Marital status:      Spouse name: Not on file    Number of children: Not on file    Years of education: Not on file    Highest education level: Not on file   Occupational History    Not on file   Tobacco Use    Smoking status: Former     Packs/day: 0.50     Years: 10.00     Pack years: 5.00     Types: Cigarettes     Quit date: 1968     Years since quittin.8    Smokeless tobacco: Never   Vaping Use    Vaping Use: Never used   Substance and Sexual Activity    Alcohol use: Yes     Comment: social    Drug use: Never    Sexual activity: Not on file   Other Topics Concern    Not on file   Social History Narrative    Not on file     Social Determinants of Health     Financial Resource Strain: Not on file   Food Insecurity: Not on file   Transportation Needs: Not on file   Physical Activity: Not on file   Stress: Not on file   Social Connections: Not on file   Intimate Partner Violence: Not on file   Housing Stability: Not on file       Family History:   Family History   Problem Relation Age of Onset    Heart Attack Mother     Other Mother          in [address]    Other Father          at age 80    Other Sister          as infant    Cancer Brother     Other Brother         unknown medical hx    Other Brother          at age 61 due to accident    Cancer Daughter         colon & lung cancer    Colon Cancer Daughter     No Known Problems Son     Colon Cancer Other     Breast Cancer Other        Review of Systems:   Positives in bold  Constitutional: fever, chills, fatigue, malaise   HENT:  rhinorrhea, sinus pain, sore throat, epistaxis    Eyes:  photophobia, visual disturbance, eye redness  Respiratory: shortness of breath, cough, hemoptysis    Cardiovascular: chest pain, palpitations, orthopnea, leg swelling   Gastrointestinal: abdominal pain, nausea, vomiting, diarrhea, constipation   Endocrine: polydipsia, polyphagia, cold intolerance, heat intolerance  Genitourinary: dysuria, flank pain, frequency, urgency   Hematologic: easy bruising, easy bleeding  Musculoskeletal: back pain, neck pain, myalgias, arthalgias  Neurological: syncope, lightheadedness, dizziness, seizures, tremors, weakness  Psychiatric/Behavioral: anxiety, depression, hallucinations  Skin: rash, itching    Physical exam:   Constitutional:  VITALS:  /64   Pulse (!) 102   Temp 97.7 °F (36.5 °C) (Oral)   Resp 22   Ht 5' 7\" (1.702 m)   Wt 269 lb (122 kg)   SpO2 94%   BMI 42.13 kg/m²     General: alert, in no apparent distress  HEENT: normocephalic, atraumatic, anicteric  Neck: supple, no mass  Lungs: non-labored respirations, clear to auscultation bilaterally, LT sided chest tube, decreased LT breath sounds   Heart: regular rate and rhythm, no murmurs or rubs  Abdomen: soft, non-tender, non-distended  MSK: no joint swelling or tenderness  Ext: no cyanosis, no peripheral edema  Neuro: alert and oriented, no gross abnormalities  Psych: normal mood and affect    Data/  CBC:   Lab Results   Component Value Date/Time    WBC 10.5 02/19/2023 05:27 AM    RBC 2.84 02/19/2023 05:27 AM    HGB 8.4 02/19/2023 05:27 AM    HCT 25.8 02/19/2023 05:27 AM    MCV 90.7 02/19/2023 05:27 AM    MCH 29.6 02/19/2023 05:27 AM    MCHC 32.6 02/19/2023 05:27 AM    RDW 15.7 02/19/2023 05:27 AM     02/19/2023 05:27 AM    MPV 8.2 11/01/2013 05:36 AM     BMP:    Lab Results   Component Value Date/Time     02/19/2023 05:27 AM    K 4.5 02/19/2023 05:27 AM     02/19/2023 05:27 AM    CO2 19 02/19/2023 05:27 AM    BUN 36 02/19/2023 05:27 AM    LABALBU 3.1 02/19/2023 05:27 AM    LABALBU 4.5 04/25/2012 01:30 PM    CREATININE 1.58 02/19/2023 05:27 AM    CALCIUM 8.7 02/19/2023 05:27 AM    GFRAA >60.0 02/02/2021 03:50 PM    LABGLOM 42.4 02/19/2023 05:27 AM    GLUCOSE 121 02/19/2023 05:27 AM    GLUCOSE 85 04/25/2012 01:30 PM     XR ABDOMEN (KUB) (SINGLE AP VIEW)    Result Date: 2/18/2023  EXAMINATION: FOUR SUPINE XRAY VIEW(S) OF THE ABDOMEN 2/18/2023 7:02 am COMPARISON: CHEST X-RAY 02/18/2023 HISTORY: ORDERING SYSTEM PROVIDED HISTORY: abd distentnion TECHNOLOGIST PROVIDED HISTORY: Reason for exam:->abd distentnion What reading provider will be dictating this exam?->CRC FINDINGS: Complete opacification of left hemithorax compatible with some combination of pleural effusion and atelectasis. Obscuration of the left heart border. Pericardial effusion not excluded. Dual chamber transvenous pacemaker in place. The lower pelvis was not imaged. Nonobstructive bowel gas pattern. The stomach, small bowel, and colon are nondilated. 9 mm calcification compatible with a stone at the midpole of the left kidney. Degenerative changes of the lumbar spine. 1.  Complete opacification of left hemithorax compatible with some combination of pleural effusion and atelectasis. Secondary obscuration of left heart border. 2.  Nonobstructive bowel gas pattern. No acute abdominal disease identified. 3.  Probable left renal stone. XR CHEST PORTABLE    Result Date: 2/18/2023  EXAMINATION: ONE XRAY VIEW OF THE CHEST 2/18/2023 11:11 am COMPARISON: None.  HISTORY: ORDERING SYSTEM PROVIDED HISTORY: chest tube placement TECHNOLOGIST PROVIDED HISTORY: Reason for exam:->chest tube placement What reading provider will be dictating this exam?->CRC FINDINGS: There is opacification of the left hemithorax. No evidence of pneumothorax. Air bronchograms are noted in the perihilar region. Since the prior study there appears to been placement of a left-sided chest tube with its tip near the left upper chest apex. Right-sided dual lead pacemaker is unchanged. Heart appears enlarged. Mild reticular opacities in the right lung are unchanged. Stable degenerative changes in the left shoulder and AC joint. 1.  Apparent interval placement of left chest tube catheter. No pneumothorax. 2.  Persistent opacification of the left hemithorax. XR CHEST PORTABLE    Result Date: 2/18/2023  EXAMINATION: ONE XRAY VIEW OF THE CHEST 2/18/2023 7:02 am COMPARISON: There are no prior recent studies available for review HISTORY: ORDERING SYSTEM PROVIDED HISTORY: loculated pleural effusion TECHNOLOGIST PROVIDED HISTORY: Reason for exam:->loculated pleural effusion What reading provider will be dictating this exam?->CRC FINDINGS: The cardiac silhouette appears enlarged. Please note the left heart border is obscured due to the left lung pathology. There is near complete whiteout of the left hemithorax. The finding could represent a combination of partial collapse of the left lung and large pleural effusion. There is no right lung infiltrate or right pleural effusion. 1. Near complete whiteout of the left hemithorax likely represent a combination of partial collapse of the left lung and large pleural effusion 2. Cardiomegaly 3. The right lung is grossly clear. FLUORO FOR SURGICAL PROCEDURES    Result Date: 2/16/2023  EXAMINATION: SPOT FLUOROSCOPIC IMAGES 2/16/2023 6:54 am TECHNIQUE: Fluoroscopy was provided by the radiology department for procedure. Radiologist was not present during examination. FLUOROSCOPY DOSE AND TYPE: Radiation Exposure Index: Fluoroscopy time equals 56.5 seconds.   Total dose equals 43.68 mGy, COMPARISON: None HISTORY: [address] PROVIDED HISTORY: pain TECHNOLOGIST PROVIDED HISTORY: Reason for exam:->pain What reading provider will be dictating this exam?->CRC Intraprocedural imaging. FINDINGS: 12 spot images of the lumbar spine were obtained. Intraprocedural fluoroscopic spot images as above. See separate procedure report for more information. Assessment:  82 y.o. male with history s/f A.fib, mesothelioma s/p radiation, TIA x2, CHF, HTN, HLD and polycythemia who was transferred to [facility] from [facility] for thoracotomy/VATS decortication due to LL collapse. ZHEN on CKD stage III: p/w Scr 1.9, ? If 2/2 volume depletion, improved back to baseline, ~1.4-1.5 w/ eGFR mid/high 40s, CKD risk factors CHF, HTN, doesn't see a nephrologist   Large LT sided pleura effusion: s/p chest tube (7/14)  Metabolic acidosis  Anemia: hemo-onc onboard  HTN  Mesothelioma  Chronic pain w/ spinal stimulator    Plan:  - function back to baseline, monitor   - checking PTH, vit D    Thank you for the consultation. Will continue to follow  Please do not hesitate to call with questions.     Austin Dumont MD, MD

## 2023-02-19 NOTE — PROGRESS NOTES
Patient ambulated to bathroom with walker and assist safety reinforced voided clear yellow urine had bowl movement. Resting in bed at present time denies any pain or discomfort.

## 2023-02-20 ENCOUNTER — APPOINTMENT (OUTPATIENT)
Dept: ULTRASOUND IMAGING | Age: 88
DRG: 186 | End: 2023-02-20
Attending: INTERNAL MEDICINE
Payer: MEDICARE

## 2023-02-20 PROBLEM — K92.1 MELENA: Status: ACTIVE | Noted: 2023-02-20

## 2023-02-20 LAB
ANION GAP SERPL CALCULATED.3IONS-SCNC: 12 MEQ/L (ref 9–15)
BASOPHILS ABSOLUTE: 0 K/UL (ref 0–0.2)
BASOPHILS RELATIVE PERCENT: 0.8 %
BUN BLDV-MCNC: 37 MG/DL (ref 8–23)
CALCIUM SERPL-MCNC: 8.6 MG/DL (ref 8.5–9.9)
CARCINOEMBRYONIC ANTIGEN: 1.8 NG/ML
CHLORIDE BLD-SCNC: 103 MEQ/L (ref 95–107)
CO2: 21 MEQ/L (ref 20–31)
CREAT SERPL-MCNC: 1.44 MG/DL (ref 0.7–1.2)
EOSINOPHILS ABSOLUTE: 0.7 K/UL (ref 0–0.7)
EOSINOPHILS RELATIVE PERCENT: 5 %
FOLATE: 7.9 NG/ML
GFR SERPL CREATININE-BSD FRML MDRD: 47.4 ML/MIN/{1.73_M2}
GLUCOSE BLD-MCNC: 110 MG/DL (ref 70–99)
HCT VFR BLD CALC: 24.9 % (ref 42–52)
HEMOGLOBIN: 8.2 G/DL (ref 14–18)
LACTATE DEHYDROGENASE, FLUID: 388 U/L
LYMPHOCYTES ABSOLUTE: 0.8 K/UL (ref 1–4.8)
LYMPHOCYTES RELATIVE PERCENT: 6 %
MCH RBC QN AUTO: 29.9 PG (ref 27–31.3)
MCHC RBC AUTO-ENTMCNC: 32.8 % (ref 33–37)
MCV RBC AUTO: 91.2 FL (ref 79–92.2)
MONOCYTES ABSOLUTE: 0.9 K/UL (ref 0.2–0.8)
MONOCYTES RELATIVE PERCENT: 6.5 %
NEUTROPHILS ABSOLUTE: 11.1 K/UL (ref 1.4–6.5)
NEUTROPHILS RELATIVE PERCENT: 83 %
NUCLEATED RED BLOOD CELLS: 1 /100 WBC
PDW BLD-RTO: 15.6 % (ref 11.5–14.5)
PLATELET # BLD: 199 K/UL (ref 130–400)
PLATELET SLIDE REVIEW: NORMAL
POLYCHROMASIA: ABNORMAL
POTASSIUM REFLEX MAGNESIUM: 4.4 MEQ/L (ref 3.4–4.9)
RBC # BLD: 2.73 M/UL (ref 4.7–6.1)
SODIUM BLD-SCNC: 136 MEQ/L (ref 135–144)
SPECIMEN TYPE: NORMAL
VANCOMYCIN RANDOM: 8.3 UG/ML (ref 10–40)
VITAMIN B-12: 505 PG/ML (ref 232–1245)
VITAMIN D 25-HYDROXY: 31.4 NG/ML
WBC # BLD: 13.4 K/UL (ref 4.8–10.8)

## 2023-02-20 PROCEDURE — 93971 EXTREMITY STUDY: CPT

## 2023-02-20 PROCEDURE — 80202 ASSAY OF VANCOMYCIN: CPT

## 2023-02-20 PROCEDURE — 6360000002 HC RX W HCPCS: Performed by: INTERNAL MEDICINE

## 2023-02-20 PROCEDURE — 2580000003 HC RX 258: Performed by: INTERNAL MEDICINE

## 2023-02-20 PROCEDURE — 6370000000 HC RX 637 (ALT 250 FOR IP): Performed by: INTERNAL MEDICINE

## 2023-02-20 PROCEDURE — 80048 BASIC METABOLIC PNL TOTAL CA: CPT

## 2023-02-20 PROCEDURE — 6360000002 HC RX W HCPCS: Performed by: FAMILY MEDICINE

## 2023-02-20 PROCEDURE — 99232 SBSQ HOSP IP/OBS MODERATE 35: CPT | Performed by: INTERNAL MEDICINE

## 2023-02-20 PROCEDURE — 82378 CARCINOEMBRYONIC ANTIGEN: CPT

## 2023-02-20 PROCEDURE — A4216 STERILE WATER/SALINE, 10 ML: HCPCS | Performed by: INTERNAL MEDICINE

## 2023-02-20 PROCEDURE — 82306 VITAMIN D 25 HYDROXY: CPT

## 2023-02-20 PROCEDURE — 1210000000 HC MED SURG R&B

## 2023-02-20 PROCEDURE — C9113 INJ PANTOPRAZOLE SODIUM, VIA: HCPCS | Performed by: INTERNAL MEDICINE

## 2023-02-20 PROCEDURE — 99222 1ST HOSP IP/OBS MODERATE 55: CPT | Performed by: INTERNAL MEDICINE

## 2023-02-20 PROCEDURE — 2580000003 HC RX 258: Performed by: FAMILY MEDICINE

## 2023-02-20 PROCEDURE — 2700000000 HC OXYGEN THERAPY PER DAY

## 2023-02-20 PROCEDURE — 83970 ASSAY OF PARATHORMONE: CPT

## 2023-02-20 PROCEDURE — 36415 COLL VENOUS BLD VENIPUNCTURE: CPT

## 2023-02-20 PROCEDURE — 94761 N-INVAS EAR/PLS OXIMETRY MLT: CPT

## 2023-02-20 PROCEDURE — 99231 SBSQ HOSP IP/OBS SF/LOW 25: CPT | Performed by: THORACIC SURGERY (CARDIOTHORACIC VASCULAR SURGERY)

## 2023-02-20 PROCEDURE — 85025 COMPLETE CBC W/AUTO DIFF WBC: CPT

## 2023-02-20 RX ADMIN — OXYCODONE 10 MG: 5 TABLET ORAL at 13:13

## 2023-02-20 RX ADMIN — MEROPENEM 1000 MG: 1 INJECTION, POWDER, FOR SOLUTION INTRAVENOUS at 08:54

## 2023-02-20 RX ADMIN — Medication 5 MG: at 21:18

## 2023-02-20 RX ADMIN — SODIUM CHLORIDE, PRESERVATIVE FREE 40 MG: 5 INJECTION INTRAVENOUS at 03:37

## 2023-02-20 RX ADMIN — METOPROLOL TARTRATE 25 MG: 25 TABLET, FILM COATED ORAL at 08:45

## 2023-02-20 RX ADMIN — VANCOMYCIN HYDROCHLORIDE 750 MG: 750 INJECTION, POWDER, LYOPHILIZED, FOR SOLUTION INTRAVENOUS at 09:34

## 2023-02-20 RX ADMIN — METOPROLOL TARTRATE 25 MG: 25 TABLET, FILM COATED ORAL at 21:18

## 2023-02-20 RX ADMIN — GUAIFENESIN 600 MG: 600 TABLET ORAL at 08:45

## 2023-02-20 RX ADMIN — GUAIFENESIN 600 MG: 600 TABLET ORAL at 21:18

## 2023-02-20 RX ADMIN — IRON SUCROSE 200 MG: 20 INJECTION, SOLUTION INTRAVENOUS at 10:50

## 2023-02-20 RX ADMIN — VANCOMYCIN HYDROCHLORIDE 750 MG: 750 INJECTION, POWDER, LYOPHILIZED, FOR SOLUTION INTRAVENOUS at 21:17

## 2023-02-20 RX ADMIN — OXYCODONE 10 MG: 5 TABLET ORAL at 08:45

## 2023-02-20 RX ADMIN — MEROPENEM 1000 MG: 1 INJECTION, POWDER, FOR SOLUTION INTRAVENOUS at 23:48

## 2023-02-20 RX ADMIN — OXYCODONE 10 MG: 5 TABLET ORAL at 03:37

## 2023-02-20 RX ADMIN — NALOXEGOL OXALATE 25 MG: 12.5 TABLET, FILM COATED ORAL at 08:44

## 2023-02-20 RX ADMIN — SODIUM CHLORIDE, PRESERVATIVE FREE 40 MG: 5 INJECTION INTRAVENOUS at 18:58

## 2023-02-20 RX ADMIN — ROSUVASTATIN CALCIUM 10 MG: 10 TABLET, FILM COATED ORAL at 08:45

## 2023-02-20 RX ADMIN — MEROPENEM 1000 MG: 1 INJECTION, POWDER, FOR SOLUTION INTRAVENOUS at 19:00

## 2023-02-20 ASSESSMENT — PAIN SCALES - GENERAL
PAINLEVEL_OUTOF10: 8
PAINLEVEL_OUTOF10: 0
PAINLEVEL_OUTOF10: 7
PAINLEVEL_OUTOF10: 10

## 2023-02-20 ASSESSMENT — PAIN DESCRIPTION - LOCATION
LOCATION: ARM;SHOULDER
LOCATION: ARM

## 2023-02-20 ASSESSMENT — PAIN DESCRIPTION - DESCRIPTORS: DESCRIPTORS: SHARP

## 2023-02-20 ASSESSMENT — PAIN DESCRIPTION - ORIENTATION
ORIENTATION: RIGHT
ORIENTATION: RIGHT

## 2023-02-20 NOTE — CONSULTS
Inpatient consult to GI  Consult performed by: Giuseppe Cheng MD  Consult ordered by: Krishna Ramirez MD        Patient Name: Jennifer Price Date: 2023  3:39 AM  MR #: [id number]  : 1935    Attending Physician: Maricruz Henry MD  Reason for consult: anemia, melena    History of Presenting Illness:      Tamika Wood is a 82 y.o. male on hospital day 2 with a history of CAD, hypertension, DVT, hyperlipidemia, A-fib, CHF, BPH, lumbar stenosis, and CVA. Past surgical history of umbilical hernia repair, lumbar laminectomy, eye surgery, arthroscopic knee surgery, permanent pacemaker, spinal cord stimulator, tonsillectomy. Family history is notable for daughter with colorectal cancer. Social history patient is  reports former nicotine, occasional EtOH, no illicit drug use. History Obtained From:  patient, electronic medical record  GI for anemia and melena-patient was admitted with shortness of breath, left pleural effusion in the context of recent diagnosis of mesothelioma status post radiation treatment . Since arrival had Pleurx catheter placed and shortness of breath is  improved. GI was asked to evaluate melena prior to arrival, He reportedly had several weeks of intermittent melanotic stool, none for the past 4 days. On arrival patient was found to have normocytic anemia, with hemoglobin of 8.2, baseline is 12.8, in the context of Coumadin, ASA, and meloxicam.  He denies heartburn, abdominal pain, dysphagia, unintentional weight loss, or changes to his bowel habits. No history of GI bleeding, no history of liver disease, no history of EGD. Most recent colonoscopy , no records are available for review.    History:      Past Medical History:   Diagnosis Date    A-fib ([facility] [address]     approx 1 year ago-cardioverted    Arthritis     Baker's cyst of knee, left     Cancer ([facility] [address]     mesothelioma - radiation treatments    Cerebral artery occlusion with cerebral infarction ([facility] [address]     TIA sx felt funny --     Congestive heart failure ([facility] [address] 2022    Coronary artery disease     HTN (hypertension)     meds > 20 yrs    Hx of blood clots     DVT left leg     Hyperlipidemia     meds > 20 yrs    Pacemaker 2022    Polycythemia     Torn meniscus     left knee     Past Surgical History:   Procedure Laterality Date    BACK SURGERY      COLONOSCOPY      COLONOSCOPY      CORONARY ANGIOPLASTY WITH STENT PLACEMENT  10/2006    x 1 cardiac stent    ENDOSCOPY, COLON, DIAGNOSTIC      EYE SURGERY      [place] with IOL OU    HERNIA REPAIR  2013    redo ca mesh in [id number] -- umbilical hernia    JOINT REPLACEMENT Bilateral  &     Bilateral TKR    KNEE SURGERY Left      arthroscopic surgery to repair meniscus of left knee    LAMINECTOMY N/A 2021    RIGHT BILATERAL L2-3 3-4 4-5 MICRODECOMPRESSION performed by Wilfredo Greene MD at [facility]    PAIN MANAGEMENT PROCEDURE N/A 2023    ATTEMPTED SPINAL CORD STIMULATOR PERMANENT PLACEMENT performed by Shanell Callaway MD at [address] N/A 1/10/2023    SPINAL CORD STIMULATOR TRIAL performed by Shanell Callaway MD at [place]  age 8    [address]  2012     Family History  Family History   Problem Relation Age of Onset    Heart Attack Mother     Other Mother          in MVA    Other Father          at age 80    Other Sister          as infant    Cancer Brother     Other Brother         unknown medical hx    Other Brother          at age 61 due to accident    Cancer Daughter         colon & lung cancer    Colon Cancer Daughter     No Known Problems Son     Colon Cancer Other     Breast Cancer Other      [] Unable to obtain due to ventilated and/ or neurologic status  Social History     Socioeconomic History    Marital status:      Spouse name: Not on file    Number of children: Not on file    Years of education: Not on file    Highest education level: Not on file   Occupational History    Not on file   Tobacco Use    Smoking status: Former     Packs/day: 0.50     Years: 10.00     Pack years: 5.00     Types: Cigarettes     Quit date: 1968     Years since quittin.8    Smokeless tobacco: Never   Vaping Use    Vaping Use: Never used   Substance and Sexual Activity    Alcohol use: Yes     Comment: social    Drug use: Never    Sexual activity: Not on file   Other Topics Concern    Not on file   Social History Narrative    Not on file     Social Determinants of Health     Financial Resource Strain: Not on file   Food Insecurity: Not on file   Transportation Needs: Not on file   Physical Activity: Not on file   Stress: Not on file   Social Connections: Not on file   Intimate Partner Violence: Not on file   Housing Stability: Not on file      [] Unable to obtain due to ventilated and/ or neurologic status    Home Medications:      Medications Prior to Admission: carboxymethylcellulose (THERATEARS) 1 % ophthalmic gel, Apply to eye  melatonin 5 MG TABS tablet, Take by mouth  enoxaparin (LOVENOX) 100 MG/ML, Inject into the skin 2 times daily  metoprolol tartrate (LOPRESSOR) 50 MG tablet, Take 25 mg by mouth 2 times daily  Elastic Bandages & Supports ([address] [facility]) [address] [facility], 1 each by Does not apply route daily Thigh high 20-30 mmhg graduated compression stockings both legs wear daily during day and off Qhs. Wear as tolerated. Do not wear if they cause increased pain. losartan (COZAAR) 100 MG tablet, Take 100 mg by mouth in the morning. meloxicam (MOBIC) 15 MG tablet, Take 15 mg by mouth in the morning. (Patient not taking: Reported on 2023)  potassium chloride (KLOR-CON M) 20 MEQ extended release tablet, Take 20 mEq by mouth in the morning and 20 mEq before bedtime.   rosuvastatin (CRESTOR) 10 MG tablet, TAKE 1 TABLET BY MOUTH EVERY DAY  furosemide (LASIX) 20 MG tablet, Take 20 mg by mouth daily  warfarin (COUMADIN) 2.5 MG tablet, Take 4 mg by mouth daily Indications: T-Th-Sat - Sun Daily per INR levels  nitroGLYCERIN (NITROSTAT) 0.4 MG SL tablet, Place 0.4 mg under the tongue every 5 minutes as needed for Chest pain up to max of 3 total doses. If no relief after 1 dose, call 911. Omega-3 Fatty Acids (FISH OIL) 1200 MG CAPS, Take by mouth daily  Cholecalciferol (VITAMIN D3) 125 MCG (5000 UT) TABS, Take by mouth daily  CPAP Machine MISC, by Does not apply route  warfarin (COUMADIN) 5 MG tablet, Take 5 mg by mouth daily Indications: Tngkrk-Rsikvctac-Lcrekh Daily per INR levels  aspirin 81 MG EC tablet, Take 81 mg by mouth daily. Current Hospital Medications:   Scheduled Meds:   metoprolol tartrate  25 mg Oral BID    rosuvastatin  10 mg Oral Daily    [Held by provider] losartan  100 mg Oral Daily    [Held by provider] furosemide  20 mg Oral Daily    [Held by provider] aspirin  81 mg Oral Daily    pantoprazole (PROTONIX) 40 mg injection  40 mg IntraVENous Q12H    meropenem  1,000 mg IntraVENous Q8H    melatonin  5 mg Oral Nightly    guaiFENesin  600 mg Oral BID    vancomycin (VANCOCIN) intermittent dosing (placeholder)   Other RX Placeholder    vancomycin  750 mg IntraVENous Q24H    naloxegol  25 mg Oral QAM     Continuous Infusions:  PRN Meds:.ondansetron **OR** ondansetron, ipratropium-albuterol, oxyCODONE **OR** oxyCODONE     Allergies: Allergies   Allergen Reactions    Benadryl [Diphenhydramine Hcl] Anxiety    Diphenhydramine Anxiety     anxious      Review of Systems:       [x] CV, Resp, Neuro, , and all other systems reviewed and negative other than listed in HPI.       Objective Findings:     Vitals:   Vitals:    02/19/23 1043 02/19/23 1907 02/19/23 2230 02/19/23 2313   BP:  120/61 120/61    Pulse: 99 86 86 86   Resp:  20     Temp:  97.9 °F (36.6 °C)     TempSrc:  Oral     SpO2: (!) 87% 94%     Weight:       Height:            Physical Examination:  General: alert  HEENT: Normocephalic, no scleral icterus. Neck: No JVD. Heart: Regular, no murmur, no rub/gallop. No RV heave. Lungs: Clear to ascultation, no rales/wheezing/rhonchi. Good chest wall excursion. Abdomen: Appearance:, no Distension , Soft , no tenderness , Scars , Bowel sounds normal  Extremities: No clubbing/cyanosis, no edema. Skin: Warm, dry, normal turgor, no rash, no bruise, no petichiae. Neuro: No myoclonus or tremor. Psych: Normal affect    Results/ Medications reviewed 2/20/2023, 8:12 AM     Laboratory, Microbiology, Pathology, Radiology, Cardiology, Medications and Transcriptions reviewed  Scheduled Meds:   metoprolol tartrate  25 mg Oral BID    rosuvastatin  10 mg Oral Daily    [Held by provider] losartan  100 mg Oral Daily    [Held by provider] furosemide  20 mg Oral Daily    [Held by provider] aspirin  81 mg Oral Daily    pantoprazole (PROTONIX) 40 mg injection  40 mg IntraVENous Q12H    meropenem  1,000 mg IntraVENous Q8H    melatonin  5 mg Oral Nightly    guaiFENesin  600 mg Oral BID    vancomycin (VANCOCIN) intermittent dosing (placeholder)   Other RX Placeholder    vancomycin  750 mg IntraVENous Q24H    naloxegol  25 mg Oral QAM     Continuous Infusions:    Recent Labs     02/18/23  0508 02/19/23 0527 02/20/23 0527   WBC 12.2* 10.5 13.4*   HGB 8.4* 8.4* 8.2*   HCT 25.1* 25.8* 24.9*   MCV 91.2 90.7 91.2    213  --      Recent Labs     02/18/23  0508 02/19/23 0527 02/20/23 0527    139 136   K 5.2* 4.5 4.4    106 103   CO2 22 19* 21   BUN 31* 36* 37*   CREATININE 1.95* 1.58* 1.44*     Recent Labs     02/19/23 0527   AST 27   ALT 12   BILIDIR <0.2   BILITOT 0.3   ALKPHOS 73     No results for input(s): LIPASE, AMYLASE in the last 72 hours.   Recent Labs     02/18/23  0508 02/19/23 0527   PROT  --  5.7*   INR 1.4  --      XR ABDOMEN (KUB) (SINGLE AP VIEW)    Result Date: 2/18/2023  EXAMINATION: FOUR SUPINE XRAY VIEW(S) OF THE ABDOMEN 2/18/2023 7:02 am COMPARISON: CHEST X-RAY 02/18/2023 HISTORY: ORDERING SYSTEM PROVIDED HISTORY: abd distentnion TECHNOLOGIST PROVIDED HISTORY: Reason for exam:->abd distentnion What reading provider will be dictating this exam?->CRC FINDINGS: Complete opacification of left hemithorax compatible with some combination of pleural effusion and atelectasis. Obscuration of the left heart border. Pericardial effusion not excluded. Dual chamber transvenous pacemaker in place. The lower pelvis was not imaged. Nonobstructive bowel gas pattern. The stomach, small bowel, and colon are nondilated. 9 mm calcification compatible with a stone at the midpole of the left kidney. Degenerative changes of the lumbar spine. 1.  Complete opacification of left hemithorax compatible with some combination of pleural effusion and atelectasis. Secondary obscuration of left heart border. 2.  Nonobstructive bowel gas pattern. No acute abdominal disease identified. 3.  Probable left renal stone. XR CHEST PORTABLE    Result Date: 2/19/2023  EXAMINATION: ONE XRAY VIEW OF THE CHEST 2/19/2023 7:51 am COMPARISON: 02/18/2023 HISTORY: ORDERING SYSTEM PROVIDED HISTORY: pleural effusion TECHNOLOGIST PROVIDED HISTORY: Reason for exam:->pleural effusion What reading provider will be dictating this exam?->CRC FINDINGS: Note is made of a left chest tube. There is no left pneumothorax. There is been partial clearing of the previously noted left pleural effusion. Significant residual airspace disease as well as a residual pleural effusion is noted The right lung is clear. There is a dual lead cardiac pacer on the left. 1. Minimal partial clearing of the left lung. The previously noted left pleural effusion is smaller 2. Stable position of the left chest tube 3. The right lung is clear. XR CHEST PORTABLE    Result Date: 2/18/2023  EXAMINATION: ONE XRAY VIEW OF THE CHEST 2/18/2023 11:11 am COMPARISON: None.  HISTORY: ORDERING SYSTEM PROVIDED HISTORY: chest tube placement TECHNOLOGIST PROVIDED HISTORY: Reason for exam:->chest tube placement What reading provider will be dictating this exam?->CRC FINDINGS: There is opacification of the left hemithorax. No evidence of pneumothorax. Air bronchograms are noted in the perihilar region. Since the prior study there appears to been placement of a left-sided chest tube with its tip near the left upper chest apex. Right-sided dual lead pacemaker is unchanged. Heart appears enlarged. Mild reticular opacities in the right lung are unchanged. Stable degenerative changes in the left shoulder and AC joint. 1.  Apparent interval placement of left chest tube catheter. No pneumothorax. 2.  Persistent opacification of the left hemithorax. XR CHEST PORTABLE    Result Date: 2/18/2023  EXAMINATION: ONE XRAY VIEW OF THE CHEST 2/18/2023 7:02 am COMPARISON: There are no prior recent studies available for review HISTORY: ORDERING SYSTEM PROVIDED HISTORY: loculated pleural effusion TECHNOLOGIST PROVIDED HISTORY: Reason for exam:->loculated pleural effusion What reading provider will be dictating this exam?->CRC FINDINGS: The cardiac silhouette appears enlarged. Please note the left heart border is obscured due to the left lung pathology. There is near complete whiteout of the left hemithorax. The finding could represent a combination of partial collapse of the left lung and large pleural effusion. There is no right lung infiltrate or right pleural effusion. 1. Near complete whiteout of the left hemithorax likely represent a combination of partial collapse of the left lung and large pleural effusion 2. Cardiomegaly 3. The right lung is grossly clear. FLUORO FOR SURGICAL PROCEDURES    Result Date: 2/16/2023  EXAMINATION: SPOT FLUOROSCOPIC IMAGES 2/16/2023 6:54 am TECHNIQUE: Fluoroscopy was provided by the radiology department for procedure. Radiologist was not present during examination.  FLUOROSCOPY DOSE AND TYPE: Radiation Exposure Index: Fluoroscopy time equals 56.5 seconds. Total dose equals 43.68 mGy, COMPARISON: None HISTORY: ORDERING SYSTEM PROVIDED HISTORY: pain TECHNOLOGIST PROVIDED HISTORY: Reason for exam:->pain What reading provider will be dictating this exam?->CRC Intraprocedural imaging. FINDINGS: 12 spot images of the lumbar spine were obtained. Intraprocedural fluoroscopic spot images as above. See separate procedure report for more information. Impression:   81 y/o male admitted patient with shortness of breath, left pleural effusion in the context of recent diagnosis of mesothelioma status post radiation treatment 11/22. Since arrival had Pleurx catheter placed and shortness of breath is  improved. GI was asked to evaluate melena prior to arrival, He reportedly had several weeks of intermittent melanotic stool, none for the past 4 days. On arrival patient was found to have normocytic anemia, with hemoglobin of 8.2, baseline is 12.8, in the context of Coumadin, ASA, and meloxicam.  He denies heartburn, abdominal pain, dysphagia, unintentional weight loss, or changes to his bowel habits. No history of GI bleeding, no history of liver disease, no history of EGD. Most recent colonoscopy 2012, no records are available for review. He has been tolerating a regular diet, and is asymptomatic with no current overt bleeding  Ddx anemia and melena: Esophagitis, gastritis, peptic ulcer disease, AVM, malignancy however least likely  Plan:   -Continue course current medical management per primary team  -Continue IV PPI twice daily  -Continue serial H&H, trend and transfuse accordingly  -Endoscopic timing to be determined by clinical course will likely need EGD and possible colonoscopy  Comments: Thank you for allowing us to participate in the care of this patient. Will continue to follow. Please call if questions or concerns arise.     Electronically signed by Kym Lan MD on 2/20/2023 at 8:12 AM    Please note this report has been partially produced using speech recognition software and may cause contain errors related to that system including grammar, punctuation and spelling as well as words and phrases that may seem inappropriate. If there are questions or concerns please feel free to contact me to clarify.

## 2023-02-20 NOTE — PROGRESS NOTES
Hematology/Oncology   Progress Note        CHIEF COMPLAINT/HPI:  Follow up of methothelioma. Pleurx catheter placed. Hemoglobin at 8.2. Has component of iron deficiency. For venofer today. REVIEW OF SYSTEMS:    Unremarkable except for symptoms mentioned in HPI.     Current Inpatient Medications:    Current Facility-Administered Medications   Medication Dose Route Frequency Provider Last Rate Last Admin    metoprolol tartrate (LOPRESSOR) tablet 25 mg  25 mg Oral BID [place] Esters Sedar, DO   25 mg at 02/19/23 2230    rosuvastatin (CRESTOR) tablet 10 mg  10 mg Oral Daily [place] Esters Sedar, DO   10 mg at 02/19/23 7297    [Held by provider] losartan (COZAAR) tablet 100 mg  100 mg Oral Daily [place] Esters Sedar, DO        [Held by provider] furosemide (LASIX) tablet 20 mg  20 mg Oral Daily [place] Esters Sedar, DO        [Held by provider] aspirin EC tablet 81 mg  81 mg Oral Daily Agustin YRAN Sedar, DO        pantoprazole (PROTONIX) 40 mg in sodium chloride (PF) 0.9 % 10 mL injection  40 mg IntraVENous Q12H Celso Cabello MD   40 mg at 02/20/23 0337    ondansetron (ZOFRAN-ODT) disintegrating tablet 4 mg  4 mg Oral Q8H PRN [place] Esters Sedar, DO        Or    ondansetron (ZOFRAN) injection 4 mg  4 mg IntraVENous Q6H PRN [place] Esters Sedar, DO        meropenem (MERREM) 1,000 mg in sodium chloride 0.9 % 100 mL IVPB (mini-bag)  1,000 mg IntraVENous Q8H Agustin DAVIS Sedar, DO   Stopped at 02/19/23 2358    melatonin disintegrating tablet 5 mg  5 mg Oral Nightly [place] Esters Sedar, DO   5 mg at 02/19/23 2230    guaiFENesin (MUCINEX) extended release tablet 600 mg  600 mg Oral BID [place] Esters Sedar, DO   600 mg at 02/19/23 2230    ipratropium-albuterol (DUONEB) nebulizer solution 1 ampule  1 ampule Inhalation Q4H PRN Jessica Specking, DO        vancomycin (VANCOCIN) intermittent dosing (placeholder)   Other RX Placeholder Wilfredo DAVIS Sedar, DO        vancomycin (VANCOCIN) 750 mg in sodium chloride 0.9 % 250 mL IVPB  750 mg IntraVENous Q24H [place] Esters Sedar, DO   Stopped at 02/19/23 1007    oxyCODONE (ROXICODONE) immediate release tablet 5 mg  5 mg Oral Q4H PRN Gildardo Conley MD        Or    oxyCODONE (ROXICODONE) immediate release tablet 10 mg  10 mg Oral Q4H PRN Gildardo Conley MD   10 mg at 02/20/23 8334    naloxegol (MOVANTIK) tablet 25 mg  25 mg Oral QAM Gildardo Conley MD   25 mg at 02/19/23 0902     Facility-Administered Medications Ordered in Other Encounters   Medication Dose Route Frequency Provider Last Rate Last Admin    sodium chloride flush 0.9 % injection 10 mL  10 mL IntraVENous PRN José Rick MD   10 mL at 07/10/19 1220       PHYSICAL EXAM:    EYES:  Lids and lashes normal, pupils equal, round and reactive to light, extra ocular muscles intact, sclera clear, conjunctiva normal    ENT:  Normocephalic, without obvious abnormality, atraumatic, sinuses nontender on palpation, external ears without lesions, oral pharynx with moist mucus membranes, tonsils without erythema or exudates, gums normal and good dentition. NECK:  Supple, symmetrical, trachea midline, no adenopathy, thyroid symmetric, not enlarged and no tenderness, skin normal    CHEST:    LUNGS:  No increased work of breathing, good air exchange, clear to auscultation bilaterally, no crackles or wheezing    CARDIOVASCULAR:  Normal apical impulse, regular rate and rhythm, normal S1 and S2, no S3 or S4, and no murmur noted    ABDOMEN:  No scars, normal bowel sounds, soft, non-distended, non-tender, no masses palpated, no hepatosplenomegally    MUSCULOSKELETAL:  There is no redness, warmth, or swelling of the joints. Full range of motion noted. Motor strength is 5 out of 5 all extremities bilaterally.   Tone is normal.  EXTREMITIES:    NEURO:    DATA:      PT/INR:  No results found for: PTINR  PTT:    Lab Results   Component Value Date/Time    APTT 67.6 02/09/2023 12:58 PM     CMP:    Lab Results   Component Value Date/Time     02/20/2023 05:27 AM    K 4.4 02/20/2023 05:27 AM     02/20/2023 05:27 AM    CO2 21 02/20/2023 05:27 AM    BUN 37 02/20/2023 05:27 AM    PROT 5.7 02/19/2023 05:27 AM     Magnesium:  No results found for: MG  Phosphorus:  No components found for: PO4  Calcium:  No results found for: CA  CBC:    Lab Results   Component Value Date/Time    WBC 13.4 02/20/2023 05:27 AM    RBC 2.73 02/20/2023 05:27 AM    HGB 8.2 02/20/2023 05:27 AM    HCT 24.9 02/20/2023 05:27 AM    MCV 91.2 02/20/2023 05:27 AM    RDW 15.6 02/20/2023 05:27 AM     02/19/2023 05:27 AM     DIFF:    Lab Results   Component Value Date/Time    MCV 91.2 02/20/2023 05:27 AM    RDW 15.6 02/20/2023 05:27 AM      LDH:  No results found for: LDH  Uric Acid:  No components found for: URIC    EKG Reviewed  Appropriate Radiology Reviewed      Pathology: Reviewed where indicated      ASSESSMENT:  Principal Problem:    Pleural effusion  Resolved Problems:    * No resolved hospital problems. *    Patient Active Problem List   Diagnosis    Ventral hernia    Ventral hernia, recurrent    Polycythemia    Spinal stenosis of lumbar region with neurogenic claudication    Atherosclerosis of native artery of both lower extremities with intermittent claudication (HCC)    Atrial fibrillation (HCC)    BPH with obstruction/lower urinary tract symptoms    Congenital cystic kidney disease    Venous insufficiency    Intermittent claudication (HCC)    Transient ischemic attack    Sleep apnea    Rosacea    Fuchs' corneal dystrophy    Excessive daytime sleepiness    Hx of blood clots    Former smoker    Lumbar stenosis with neurogenic claudication    Congestive heart failure (HCC)    Lumbar spondylosis    Balance disorder    Postlaminectomy syndrome, lumbar region    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           Electronically signed by Mor Piper MD Tevin on 2/20/23 at 8:36 AM EST

## 2023-02-20 NOTE — PROGRESS NOTES
Shift assessment complete. Meds as ordered. Patient denies any discomfort. Pleurx intact, draining pink tinged drainage. Bed in lowest position. Call light within reach. Will continue to monitor.

## 2023-02-20 NOTE — CARE COORDINATION
This LSW met with patient, patients wife, daughter and granddaughter at bedside this am. Discharge plans discussed. Patient is currently on 2L of 02- he does not wear 02 at home- patient may need home 02 eval prior to discharge. Patient does have C-PAP at home. Patient  is requesting [facility] upon discharge to manage Pleurex catheter. Marvie Klinefelter at [facility] notified of referral by this LSW today. LSW /CM to follow.

## 2023-02-20 NOTE — CARE COORDINATION
SPOKE W/RICHARD FROM [facility] TO GIVE REFERRAL. HOME CARE ORDERED. CM/LSW TO FOLLOW AND ASSESS NEEDS.

## 2023-02-20 NOTE — PROGRESS NOTES
Nephrology Progress Note    Assessment:  82 y.o. male with history s/f A.fib, mesothelioma s/p radiation, TIA x2, CHF, HTN, HLD and polycythemia who was transferred to [facility] from [facility] for thoracotomy/VATS decortication due to LL collapse. ZHEN on CKD stage III: p/w Scr 1.9, ?  If 2/2 volume depletion, improved back to baseline, ~1.4-1.5 w/ eGFR mid/high 40s, CKD risk factors CHF, HTN, doesn't see a nephrologist   Large LT sided pleura effusion: s/p chest tube (9/52)  Metabolic acidosis  Anemia: hemo-onc onboard  HTN  Mesothelioma  Chronic pain w/ spinal stimulator     Plan:  - function back to baseline, monitor   - ok to f/u w/ us in 3-4 weeks  - checking PTH, vit D (pending)      Patient Active Problem List:     Ventral hernia     Ventral hernia, recurrent     Polycythemia     Spinal stenosis of lumbar region with neurogenic claudication     Atherosclerosis of native artery of both lower extremities with intermittent claudication (HCC)     Atrial fibrillation (HCC)     BPH with obstruction/lower urinary tract symptoms     Congenital cystic kidney disease     Venous insufficiency     Intermittent claudication (HCC)     Transient ischemic attack     Sleep apnea     Rosacea     Fuchs' corneal dystrophy     Excessive daytime sleepiness     Hx of blood clots     Former smoker     Lumbar stenosis with neurogenic claudication     Congestive heart failure (HCC)     Lumbar spondylosis     Balance disorder     Postlaminectomy syndrome, lumbar region     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[facility] [address]     Mixed hyperlipidemia     Sick sinus syndrome ([facility] [address]     Presence of cardiac pacemaker     Lumbar post-laminectomy syndrome     Pleural effusion      Subjective:  Admit Date: 2/18/2023    Interval History: na remains stable, CXR has shown improvement     Medications:  Scheduled Meds:   metoprolol tartrate  25 mg Oral BID    rosuvastatin  10 mg Oral Daily    [Held by provider] losartan  100 mg Oral Daily    [Held by provider] furosemide  20 mg Oral Daily    [Held by provider] aspirin  81 mg Oral Daily    pantoprazole (PROTONIX) 40 mg injection  40 mg IntraVENous Q12H    meropenem  1,000 mg IntraVENous Q8H    melatonin  5 mg Oral Nightly    guaiFENesin  600 mg Oral BID    vancomycin (VANCOCIN) intermittent dosing (placeholder)   Other RX Placeholder    vancomycin  750 mg IntraVENous Q24H    naloxegol  25 mg Oral QAM     Continuous Infusions:    CBC:   Recent Labs     02/18/23  0508 02/19/23 0527 02/20/23 0527   WBC 12.2* 10.5 13.4*   HGB 8.4* 8.4* 8.2*    213  --      CMP:    Recent Labs     02/18/23  0508 02/19/23 0527 02/20/23 0527    139 136   K 5.2* 4.5 4.4    106 103   CO2 22 19* 21   BUN 31* 36* 37*   CREATININE 1.95* 1.58* 1.44*   GLUCOSE 131* 121* 110*   CALCIUM 9.0 8.7 8.6   LABGLOM 33.0* 42.4* 47.4*     Troponin: No results for input(s): TROPONINI in the last 72 hours. BNP: No results for input(s): BNP in the last 72 hours. INR:   Recent Labs     02/18/23 0508   INR 1.4     Lipids: No results for input(s): CHOL, LDLDIRECT, TRIG, HDL, AMYLASE, LIPASE in the last 72 hours. Liver:   Recent Labs     02/19/23 0527   AST 27   ALT 12   ALKPHOS 73   PROT 5.7*   LABALBU 3.1*   BILITOT 0.3     Iron:    Recent Labs     02/19/23 0527   FERRITIN 129     Urinalysis: No results for input(s): UA in the last 72 hours.     Objective:  Vitals: /61   Pulse 86   Temp 97.9 °F (36.6 °C) (Oral)   Resp 20   Ht 5' 7\" (1.702 m)   Wt 269 lb (122 kg)   SpO2 94%   BMI 42.13 kg/m²    Wt Readings from Last 3 Encounters:   02/18/23 269 lb (122 kg)   02/16/23 264 lb (119.7 kg)   02/14/23 264 lb (119.7 kg)      24HR INTAKE/OUTPUT:    Intake/Output Summary (Last 24 hours) at 2/20/2023 1100  Last data filed at 2/20/2023 0743  Gross per 24 hour   Intake 932.38 ml   Output 619 ml   Net 313.38 ml       General: alert, in no apparent distress  HEENT: normocephalic, atraumatic, anicteric  Lungs: non-labored respirations, clear to auscultation bilaterally, + chest tube  Heart: regular rate and rhythm, no murmurs or rubs  Abdomen: soft, non-tender, non-distended  Ext: no cyanosis, no peripheral edema  Neuro: alert and oriented, no gross abnormalities      Electronically signed by Jaci Mills MD, MD

## 2023-02-20 NOTE — PROGRESS NOTES
02/20/23 1357   Resting (Room Air)   SpO2 88   After Walk   SpO2 95   O2 Device Nasal cannula   O2 Flow Rate (l/min) 2 l/min   Does the Patient Qualify for Home O2 Yes   Liter Flow at Rest 3   Liter Flow on Exertion 3   Does the Patient Need Portable Oxygen Tanks Yes

## 2023-02-20 NOTE — PROGRESS NOTES
INPATIENT PROGRESS NOTES    PATIENT NAME: Jermain Stewart  MRN: [id number]  SERVICE DATE:  February 20, 2023   SERVICE TIME:  5:35 PM      PRIMARY SERVICE: Pulmonary Disease    CHIEF COMPLAIN: Shortness of breath      INTERVAL HPI: Patient seen and examined at bedside, Interval Notes, orders reviewed. Nursing notes noted  Patient reports few days history of worsening shortness of breath. No chest pain. Productive. Mucus. No nausea vomiting diarrhea abdominal pain. OBJECTIVE    Body mass index is 42.13 kg/m². PHYSICAL EXAM:  Vitals:  /60   Pulse 75   Temp 98.2 °F (36.8 °C)   Resp 20   Ht 5' 7\" (1.702 m)   Wt 269 lb (122 kg)   SpO2 95%   BMI 42.13 kg/m²   General: Alert, awake . comfortable in bed, No distress. Head: Atraumatic , Normocephalic   Eyes: PERRL. No sclera icterus. No conjunctival injection. No discharge   ENT: No nasal  discharge. Pharynx clear. Neck:  Trachea midline. No thyromegaly, no JVD, No cervical adenopathy. Chest : Bilaterally symmetrical ,Normal effort,  No accessory muscle use  Lung : . Fair BS bilateral, decreased BS at bases. No Rales. No wheezing. No rhonchi. Heart[de-identified] Normal  rate. Regular rhythm. No mumur ,  Rub or gallop  ABD: Non-tender. Non-distended. No masses. No organmegaly. Normal bowel sounds. No hernia. Ext : No Pitting both leg , No Cyanosis No clubbing  Neuro: no focal weakness          DATA:   Recent Labs     02/19/23 0527 02/20/23 0527   WBC 10.5 13.4*   HGB 8.4* 8.2*   HCT 25.8* 24.9*   MCV 90.7 91.2    199     Recent Labs     02/19/23 0527 02/20/23 0527    136   K 4.5 4.4    103   CO2 19* 21   BUN 36* 37*   CREATININE 1.58* 1.44*   GLUCOSE 121* 110*   CALCIUM 8.7 8.6   PROT 5.7*  --    LABALBU 3.1*  --    BILITOT 0.3  --    ALKPHOS 73  --    AST 27  --    ALT 12  --    LABGLOM 42.4* 47.4*       MV Settings:     FiO2 : 21 %    No results for input(s): PHART, JQJ7SJU, PO2ART, YRR1JMR, BEART, R9LTQBAN in the last 72 hours.     O2 Device: Nasal cannula  O2 Flow Rate (L/min): 3 L/min    ADULT DIET; Regular  Diet NPO Exceptions are: Sips of Water with Meds     MEDICATIONS during current hospitalization:    Continuous Infusions:      Scheduled Meds:   vancomycin  750 mg IntraVENous Q12H    metoprolol tartrate  25 mg Oral BID    rosuvastatin  10 mg Oral Daily    [Held by provider] losartan  100 mg Oral Daily    [Held by provider] furosemide  20 mg Oral Daily    [Held by provider] aspirin  81 mg Oral Daily    pantoprazole (PROTONIX) 40 mg injection  40 mg IntraVENous Q12H    meropenem  1,000 mg IntraVENous Q8H    melatonin  5 mg Oral Nightly    guaiFENesin  600 mg Oral BID    vancomycin (VANCOCIN) intermittent dosing (placeholder)   Other RX Placeholder    naloxegol  25 mg Oral QAM       PRN Meds:ondansetron **OR** ondansetron, ipratropium-albuterol, oxyCODONE **OR** oxyCODONE    Radiology  XR ABDOMEN (KUB) (SINGLE AP VIEW)    Result Date: 2/18/2023  EXAMINATION: FOUR SUPINE XRAY VIEW(S) OF THE ABDOMEN 2/18/2023 7:02 am COMPARISON: CHEST X-RAY 02/18/2023 HISTORY: ORDERING SYSTEM PROVIDED HISTORY: abd distentnion TECHNOLOGIST PROVIDED HISTORY: Reason for exam:->abd distentnion What reading provider will be dictating this exam?->CRC FINDINGS: Complete opacification of left hemithorax compatible with some combination of pleural effusion and atelectasis. Obscuration of the left heart border. Pericardial effusion not excluded. Dual chamber transvenous pacemaker in place. The lower pelvis was not imaged. Nonobstructive bowel gas pattern. The stomach, small bowel, and colon are nondilated. 9 mm calcification compatible with a stone at the midpole of the left kidney. Degenerative changes of the lumbar spine. 1.  Complete opacification of left hemithorax compatible with some combination of pleural effusion and atelectasis. Secondary obscuration of left heart border. 2.  Nonobstructive bowel gas pattern. No acute abdominal disease identified.  3. Probable left renal stone. XR CHEST PORTABLE    Result Date: 2/19/2023  EXAMINATION: ONE XRAY VIEW OF THE CHEST 2/19/2023 7:51 am COMPARISON: 02/18/2023 HISTORY: ORDERING SYSTEM PROVIDED HISTORY: pleural effusion TECHNOLOGIST PROVIDED HISTORY: Reason for exam:->pleural effusion What reading provider will be dictating this exam?->CRC FINDINGS: Note is made of a left chest tube. There is no left pneumothorax. There is been partial clearing of the previously noted left pleural effusion. Significant residual airspace disease as well as a residual pleural effusion is noted The right lung is clear. There is a dual lead cardiac pacer on the left. 1. Minimal partial clearing of the left lung. The previously noted left pleural effusion is smaller 2. Stable position of the left chest tube 3. The right lung is clear. XR CHEST PORTABLE    Result Date: 2/18/2023  EXAMINATION: ONE XRAY VIEW OF THE CHEST 2/18/2023 11:11 am COMPARISON: None. HISTORY: ORDERING SYSTEM PROVIDED HISTORY: chest tube placement TECHNOLOGIST PROVIDED HISTORY: Reason for exam:->chest tube placement What reading provider will be dictating this exam?->CRC FINDINGS: There is opacification of the left hemithorax. No evidence of pneumothorax. Air bronchograms are noted in the perihilar region. Since the prior study there appears to been placement of a left-sided chest tube with its tip near the left upper chest apex. Right-sided dual lead pacemaker is unchanged. Heart appears enlarged. Mild reticular opacities in the right lung are unchanged. Stable degenerative changes in the left shoulder and AC joint. 1.  Apparent interval placement of left chest tube catheter. No pneumothorax. 2.  Persistent opacification of the left hemithorax.      XR CHEST PORTABLE    Result Date: 2/18/2023  EXAMINATION: ONE XRAY VIEW OF THE CHEST 2/18/2023 7:02 am COMPARISON: There are no prior recent studies available for review HISTORY: [address] [place] [address] HISTORY: loculated pleural effusion TECHNOLOGIST PROVIDED HISTORY: Reason for exam:->loculated pleural effusion What reading provider will be dictating this exam?->CRC FINDINGS: The cardiac silhouette appears enlarged. Please note the left heart border is obscured due to the left lung pathology. There is near complete whiteout of the left hemithorax. The finding could represent a combination of partial collapse of the left lung and large pleural effusion. There is no right lung infiltrate or right pleural effusion. 1. Near complete whiteout of the left hemithorax likely represent a combination of partial collapse of the left lung and large pleural effusion 2. Cardiomegaly 3. The right lung is grossly clear. FLUORO FOR SURGICAL PROCEDURES    Result Date: 2/16/2023  EXAMINATION: SPOT FLUOROSCOPIC IMAGES 2/16/2023 6:54 am TECHNIQUE: Fluoroscopy was provided by the radiology department for procedure. Radiologist was not present during examination. FLUOROSCOPY DOSE AND TYPE: Radiation Exposure Index: Fluoroscopy time equals 56.5 seconds. Total dose equals 43.68 mGy, COMPARISON: None HISTORY: ORDERING SYSTEM PROVIDED HISTORY: pain TECHNOLOGIST PROVIDED HISTORY: Reason for exam:->pain What reading provider will be dictating this exam?->CRC Intraprocedural imaging. FINDINGS: 12 spot images of the lumbar spine were obtained. Intraprocedural fluoroscopic spot images as above. See separate procedure report for more information. IMPRESSION AND SUGGESTION:    Large left-sided pleural effusion, etiology is not clear could be due to underlying prior history of mesothelioma, will need further work-up  Worsening shortness of breath secondary to #1  SCOTTY  Heart failure  A-fib, on Coumadin at baseline currently Coumadin on hold    Patient had chest tube placed on left side. Draining bloody fluid. Oncology is following regarding mesothelioma. .  Continue O2 to keep saturation 90% or above and continue present treatment plan. NOTE: This report was transcribed using voice recognition software. Every effort was made to ensure accuracy; however, inadvertent computerized transcription errors may be present.       Electronically signed by Xiomara Espinosa MD, FCCP on 2/20/2023 at 5:35 PM

## 2023-02-20 NOTE — PROGRESS NOTES
Assessment complete. Patient A&Ox4. VSS. He complains of pain right upper arm 8//10. Medication given per [facility]. Chest tube site assessed and is CDI. Patient denies other needs at this time. Call light in reach.

## 2023-02-20 NOTE — PROGRESS NOTES
[address] [facility] Pharmacokinetic Monitoring Service - Vancomycin    Consulting Provider: Dr. Brandy Schmidt   Indication: Pleural Effusion  Target Concentration: Goal AUC/MANOLO 400-600 mg*hr/L  Day of Therapy: 3  Additional Antimicrobials: Merrem    Pertinent Laboratory Values: Wt Readings from Last 1 Encounters:   02/18/23 269 lb (122 kg)     Temp Readings from Last 1 Encounters:   02/20/23 98.2 °F (36.8 °C)     Estimated Creatinine Clearance: 47 mL/min (A) (based on SCr of 1.44 mg/dL (H)). Recent Labs     02/19/23 0527 02/20/23 0527   CREATININE 1.58* 1.44*   WBC 10.5 13.4*     Procalcitonin:   Procalcitonin   Date Value Ref Range Status   02/19/2023 0.09 0.00 - 0.15 ng/mL Final     Comment:     Suspected Sepsis:  Low likelihood of sepsis  <.50 ng/mL    Increased likelihood of sepsis 0.50-2.00 ng/mL  Antibiotics encouraged    High risk of sepsis/shock   >2.00 ng/mL  Antibiotics strongly encouraged    Suspected Lower Respiratory Tract Infections:  Low likelihood of bacterial infection  <0.24 ng/mL    Increased likelihood of bacterial infection >0.24 ng/mL  Antibiotics encouraged    With successful antibiotic therapy, PCT levels should decrease  rapidly. (Half-life of 24 to 36 hours.)    Procalcitonin values from samples collected within the first  6 hours of systemic infection may still be low. Retesting may be indicated. Values from day 1 and day 4 can be entered into the Change in  Procalcitonin Calculator to determine the patient's  Mortality Risk Prognosis  ([email or web address]-pct-[email or web address] com)    In healthy neonates, plasma Procalcitonin (PCT) concentrations  increase gradually after birth, reaching peak values at about  24 hours of age then decrease to normal values below 0.5 ng/mL  by 48-72 hours of age. Pertinent Cultures:  Procedure Component Value Units Date/Time   Culture, Body Fluid [[id number]] Collected: 02/19/23 1726   Order Status: Completed Specimen:  Body Fluid from Thoracentesis Updated: 02/20/23 0953    Body Fluid Culture, Sterile --    Direct Exam:  MODERATE NEUTROPHILS   Direct Exam:  NO ORGANISMS SEEN   Direct Exam:  Gram stain made from cytocentrifuged specimen. Organisms   Direct Exam:  and cells will be concentrated.    Cult,Fluid:  NO GROWTH 9 HOURS   Performed at [facility], [address] [place] [address]   [phone]    Narrative:     ORDER#: [id number]                          ORDERED BY: Jonah Lennon   SOURCE: Thoracentesis Body Fluid           COLLECTED:  02/19/23 17:26   ANTIBIOTICS AT JUDY.:                      RECEIVED :  02/19/23 17:26   Concentrate (centrifuge) body fluid add SCNFL to order   Culture, Blood 1 [[id number]] Collected: 02/18/23 0500   Order Status: Canceled Specimen: Blood    Culture, Blood 2 [[id number]] Collected: 02/18/23 0500   Order Status: Canceled Specimen: Blood    Culture, Respiratory [[id number]]    Order Status: No result Specimen: Sputum Expectorated        Recent vancomycin administrations                     vancomycin (VANCOCIN) 750 mg in sodium chloride 0.9 % 250 mL IVPB (mg) 750 mg New Bag 02/20/23 0934     750 mg New Bag 02/19/23 0907    vancomycin (VANCOCIN) 1,750 mg in sodium chloride 0.9 % 500 mL IVPB (mg) 1,750 mg New Bag 02/18/23 0903                    Assessment:  Date/Time Current Dose Concentration Timing of Concentration (h) AUC   02/20/23 0527 750 mg q24h 8.3 mg/L 20.25 h post dose 277 mg/L.hr   Note: Serum concentrations collected for AUC dosing may appear elevated if collected in close proximity to the dose administered, this is not necessarily an indication of toxicity    Plan:  Current dosing regimen is sub-therapeutic  Increase dose to 750 mg IVPB q12h  Repeat vancomycin concentration ordered for 02/22/23 @ 0600   Pharmacy will continue to monitor patient and adjust therapy as indicated    Thank you for the consult,  Venkata Bello, [address] [place] [address]  2/20/2023 3:48 PM

## 2023-02-21 ENCOUNTER — ANESTHESIA EVENT (OUTPATIENT)
Dept: ENDOSCOPY | Age: 88
End: 2023-02-21
Payer: MEDICARE

## 2023-02-21 ENCOUNTER — ANESTHESIA (OUTPATIENT)
Dept: ENDOSCOPY | Age: 88
End: 2023-02-21
Payer: MEDICARE

## 2023-02-21 PROBLEM — K29.70 GASTRITIS WITHOUT BLEEDING: Status: ACTIVE | Noted: 2023-02-21

## 2023-02-21 LAB
ANION GAP SERPL CALCULATED.3IONS-SCNC: 12 MEQ/L (ref 9–15)
BASOPHILS ABSOLUTE: 0.1 K/UL (ref 0–0.2)
BASOPHILS RELATIVE PERCENT: 0.7 %
BUN BLDV-MCNC: 35 MG/DL (ref 8–23)
CALCIUM SERPL-MCNC: 8.8 MG/DL (ref 8.5–9.9)
CHLORIDE BLD-SCNC: 104 MEQ/L (ref 95–107)
CO2: 21 MEQ/L (ref 20–31)
CREAT SERPL-MCNC: 1.51 MG/DL (ref 0.7–1.2)
EOSINOPHILS ABSOLUTE: 0.3 K/UL (ref 0–0.7)
EOSINOPHILS RELATIVE PERCENT: 2.8 %
GFR SERPL CREATININE-BSD FRML MDRD: 44.8 ML/MIN/{1.73_M2}
GLUCOSE BLD-MCNC: 118 MG/DL (ref 70–99)
HCT VFR BLD CALC: 25.1 % (ref 42–52)
HEMOGLOBIN: 8.2 G/DL (ref 14–18)
LYMPHOCYTES ABSOLUTE: 0.6 K/UL (ref 1–4.8)
LYMPHOCYTES RELATIVE PERCENT: 5.4 %
MCH RBC QN AUTO: 29.3 PG (ref 27–31.3)
MCHC RBC AUTO-ENTMCNC: 32.8 % (ref 33–37)
MCV RBC AUTO: 89.5 FL (ref 79–92.2)
MISCELLANEOUS LAB TEST ORDER: NORMAL
MONOCYTES ABSOLUTE: 1.1 K/UL (ref 0.2–0.8)
MONOCYTES RELATIVE PERCENT: 9.3 %
NEUTROPHILS ABSOLUTE: 9.9 K/UL (ref 1.4–6.5)
NEUTROPHILS RELATIVE PERCENT: 81.8 %
PDW BLD-RTO: 15.3 % (ref 11.5–14.5)
PLATELET # BLD: 238 K/UL (ref 130–400)
POTASSIUM REFLEX MAGNESIUM: 4.3 MEQ/L (ref 3.4–4.9)
RBC # BLD: 2.8 M/UL (ref 4.7–6.1)
SODIUM BLD-SCNC: 137 MEQ/L (ref 135–144)
WBC # BLD: 12.1 K/UL (ref 4.8–10.8)
WHOPPER PROMPT: NORMAL

## 2023-02-21 PROCEDURE — 6360000002 HC RX W HCPCS: Performed by: INTERNAL MEDICINE

## 2023-02-21 PROCEDURE — 2580000003 HC RX 258: Performed by: INTERNAL MEDICINE

## 2023-02-21 PROCEDURE — 7100000010 HC PHASE II RECOVERY - FIRST 15 MIN: Performed by: INTERNAL MEDICINE

## 2023-02-21 PROCEDURE — 85025 COMPLETE CBC W/AUTO DIFF WBC: CPT

## 2023-02-21 PROCEDURE — 6360000002 HC RX W HCPCS: Performed by: NURSE ANESTHETIST, CERTIFIED REGISTERED

## 2023-02-21 PROCEDURE — 6370000000 HC RX 637 (ALT 250 FOR IP): Performed by: INTERNAL MEDICINE

## 2023-02-21 PROCEDURE — 6360000002 HC RX W HCPCS: Performed by: FAMILY MEDICINE

## 2023-02-21 PROCEDURE — A4216 STERILE WATER/SALINE, 10 ML: HCPCS | Performed by: INTERNAL MEDICINE

## 2023-02-21 PROCEDURE — 43235 EGD DIAGNOSTIC BRUSH WASH: CPT | Performed by: INTERNAL MEDICINE

## 2023-02-21 PROCEDURE — 36415 COLL VENOUS BLD VENIPUNCTURE: CPT

## 2023-02-21 PROCEDURE — 80048 BASIC METABOLIC PNL TOTAL CA: CPT

## 2023-02-21 PROCEDURE — 2580000003 HC RX 258

## 2023-02-21 PROCEDURE — 7100000011 HC PHASE II RECOVERY - ADDTL 15 MIN: Performed by: INTERNAL MEDICINE

## 2023-02-21 PROCEDURE — 3609017100 HC EGD: Performed by: INTERNAL MEDICINE

## 2023-02-21 PROCEDURE — 1210000000 HC MED SURG R&B

## 2023-02-21 PROCEDURE — 2580000003 HC RX 258: Performed by: FAMILY MEDICINE

## 2023-02-21 PROCEDURE — 2709999900 HC NON-CHARGEABLE SUPPLY: Performed by: INTERNAL MEDICINE

## 2023-02-21 PROCEDURE — 0DJ08ZZ INSPECTION OF UPPER INTESTINAL TRACT, VIA NATURAL OR ARTIFICIAL OPENING ENDOSCOPIC: ICD-10-PCS | Performed by: INTERNAL MEDICINE

## 2023-02-21 PROCEDURE — 3700000000 HC ANESTHESIA ATTENDED CARE: Performed by: INTERNAL MEDICINE

## 2023-02-21 PROCEDURE — 2500000003 HC RX 250 WO HCPCS: Performed by: NURSE ANESTHETIST, CERTIFIED REGISTERED

## 2023-02-21 PROCEDURE — 99232 SBSQ HOSP IP/OBS MODERATE 35: CPT | Performed by: INTERNAL MEDICINE

## 2023-02-21 PROCEDURE — C9113 INJ PANTOPRAZOLE SODIUM, VIA: HCPCS | Performed by: INTERNAL MEDICINE

## 2023-02-21 RX ORDER — SODIUM CHLORIDE 9 MG/ML
INJECTION, SOLUTION INTRAVENOUS PRN
Status: CANCELLED | OUTPATIENT
Start: 2023-02-21

## 2023-02-21 RX ORDER — ETOMIDATE 2 MG/ML
INJECTION INTRAVENOUS
Status: COMPLETED
Start: 2023-02-21 | End: 2023-02-21

## 2023-02-21 RX ORDER — SODIUM CHLORIDE 9 MG/ML
INJECTION, SOLUTION INTRAVENOUS ONCE
Status: COMPLETED | OUTPATIENT
Start: 2023-02-21 | End: 2023-02-21

## 2023-02-21 RX ORDER — SODIUM CHLORIDE 0.9 % (FLUSH) 0.9 %
5-40 SYRINGE (ML) INJECTION EVERY 12 HOURS SCHEDULED
Status: CANCELLED | OUTPATIENT
Start: 2023-02-21

## 2023-02-21 RX ORDER — SODIUM CHLORIDE 9 MG/ML
INJECTION, SOLUTION INTRAVENOUS
Status: COMPLETED
Start: 2023-02-21 | End: 2023-02-21

## 2023-02-21 RX ORDER — ETOMIDATE 2 MG/ML
INJECTION INTRAVENOUS PRN
Status: DISCONTINUED | OUTPATIENT
Start: 2023-02-21 | End: 2023-02-21 | Stop reason: SDUPTHER

## 2023-02-21 RX ORDER — MIDAZOLAM HYDROCHLORIDE 1 MG/ML
INJECTION INTRAMUSCULAR; INTRAVENOUS PRN
Status: DISCONTINUED | OUTPATIENT
Start: 2023-02-21 | End: 2023-02-21 | Stop reason: SDUPTHER

## 2023-02-21 RX ORDER — ACETAMINOPHEN 325 MG/1
650 TABLET ORAL EVERY 4 HOURS PRN
Status: DISCONTINUED | OUTPATIENT
Start: 2023-02-21 | End: 2023-02-25 | Stop reason: HOSPADM

## 2023-02-21 RX ORDER — MAGNESIUM HYDROXIDE 1200 MG/15ML
LIQUID ORAL PRN
Status: DISCONTINUED | OUTPATIENT
Start: 2023-02-21 | End: 2023-02-21 | Stop reason: ALTCHOICE

## 2023-02-21 RX ORDER — MIDAZOLAM HYDROCHLORIDE 1 MG/ML
INJECTION INTRAMUSCULAR; INTRAVENOUS
Status: COMPLETED
Start: 2023-02-21 | End: 2023-02-21

## 2023-02-21 RX ORDER — SIMETHICONE 20 MG/.3ML
EMULSION ORAL PRN
Status: DISCONTINUED | OUTPATIENT
Start: 2023-02-21 | End: 2023-02-21 | Stop reason: ALTCHOICE

## 2023-02-21 RX ORDER — LIDOCAINE HYDROCHLORIDE 20 MG/ML
INJECTION, SOLUTION INFILTRATION; PERINEURAL PRN
Status: DISCONTINUED | OUTPATIENT
Start: 2023-02-21 | End: 2023-02-21 | Stop reason: SDUPTHER

## 2023-02-21 RX ORDER — SODIUM CHLORIDE 0.9 % (FLUSH) 0.9 %
5-40 SYRINGE (ML) INJECTION PRN
Status: CANCELLED | OUTPATIENT
Start: 2023-02-21

## 2023-02-21 RX ADMIN — SODIUM CHLORIDE, PRESERVATIVE FREE 40 MG: 5 INJECTION INTRAVENOUS at 17:50

## 2023-02-21 RX ADMIN — METOPROLOL TARTRATE 25 MG: 25 TABLET, FILM COATED ORAL at 20:17

## 2023-02-21 RX ADMIN — SODIUM CHLORIDE: 9 INJECTION, SOLUTION INTRAVENOUS at 08:36

## 2023-02-21 RX ADMIN — MEROPENEM 1000 MG: 1 INJECTION, POWDER, FOR SOLUTION INTRAVENOUS at 10:15

## 2023-02-21 RX ADMIN — MEROPENEM 1000 MG: 1 INJECTION, POWDER, FOR SOLUTION INTRAVENOUS at 17:54

## 2023-02-21 RX ADMIN — Medication 5 MG: at 20:17

## 2023-02-21 RX ADMIN — NALOXEGOL OXALATE 25 MG: 12.5 TABLET, FILM COATED ORAL at 10:10

## 2023-02-21 RX ADMIN — SODIUM CHLORIDE, PRESERVATIVE FREE 40 MG: 5 INJECTION INTRAVENOUS at 03:08

## 2023-02-21 RX ADMIN — GUAIFENESIN 600 MG: 600 TABLET ORAL at 10:08

## 2023-02-21 RX ADMIN — VANCOMYCIN HYDROCHLORIDE 750 MG: 750 INJECTION, POWDER, LYOPHILIZED, FOR SOLUTION INTRAVENOUS at 21:57

## 2023-02-21 RX ADMIN — VANCOMYCIN HYDROCHLORIDE 750 MG: 750 INJECTION, POWDER, LYOPHILIZED, FOR SOLUTION INTRAVENOUS at 10:16

## 2023-02-21 RX ADMIN — LIDOCAINE HYDROCHLORIDE 60 MG: 20 INJECTION, SOLUTION INFILTRATION; PERINEURAL at 08:43

## 2023-02-21 RX ADMIN — ETOMIDATE 20 MG: 2 INJECTION, SOLUTION INTRAVENOUS at 08:43

## 2023-02-21 RX ADMIN — METOPROLOL TARTRATE 25 MG: 25 TABLET, FILM COATED ORAL at 10:08

## 2023-02-21 RX ADMIN — ETOMIDATE 4 MG: 2 INJECTION, SOLUTION INTRAVENOUS at 08:50

## 2023-02-21 RX ADMIN — ACETAMINOPHEN 650 MG: 325 TABLET ORAL at 20:17

## 2023-02-21 RX ADMIN — MIDAZOLAM HYDROCHLORIDE 2 MG: 1 INJECTION, SOLUTION INTRAMUSCULAR; INTRAVENOUS at 08:39

## 2023-02-21 RX ADMIN — ROSUVASTATIN CALCIUM 10 MG: 10 TABLET, FILM COATED ORAL at 10:08

## 2023-02-21 RX ADMIN — GUAIFENESIN 600 MG: 600 TABLET ORAL at 20:17

## 2023-02-21 ASSESSMENT — PAIN SCALES - GENERAL: PAINLEVEL_OUTOF10: 0

## 2023-02-21 ASSESSMENT — PAIN - FUNCTIONAL ASSESSMENT: PAIN_FUNCTIONAL_ASSESSMENT: 0-10

## 2023-02-21 NOTE — ANESTHESIA PRE PROCEDURE
Department of Anesthesiology  Preprocedure Note       Name:  Jay Shetty   Age:  82 y.o.  :  1935                                          MRN:  [id number]         Date:  2023      Surgeon: Damon Almendarez):  Sarah Mcgregor MD    Procedure: Procedure(s):  EGD DIAGNOSTIC ONLY    Medications prior to admission:   Prior to Admission medications    Medication Sig Start Date End Date Taking? Authorizing Provider   carboxymethylcellulose (THERATEARS) 1 % ophthalmic gel Apply to eye    Historical Provider, MD   melatonin 5 MG TABS tablet Take by mouth    Historical Provider, MD   enoxaparin (LOVENOX) 100 MG/ML Inject into the skin 2 times daily    Historical Provider, MD   metoprolol tartrate (LOPRESSOR) 50 MG tablet Take 25 mg by mouth 2 times daily    Historical Provider, MD   Elastic Bandages & Supports ([facility]) [facility] 1 each by Does not apply route daily Thigh high 20-30 mmhg graduated compression stockings both legs wear daily during day and off Qhs. Wear as tolerated. Do not wear if they cause increased pain. 22   John Macias, APRN - CNP   losartan (COZAAR) 100 MG tablet Take 100 mg by mouth in the morning. Historical Provider, MD   meloxicam (MOBIC) 15 MG tablet Take 15 mg by mouth in the morning. Patient not taking: Reported on 2023    Historical Provider, MD   potassium chloride (KLOR-CON M) 20 MEQ extended release tablet Take 20 mEq by mouth in the morning and 20 mEq before bedtime.     Historical Provider, MD   rosuvastatin (CRESTOR) 10 MG tablet TAKE 1 TABLET BY MOUTH EVERY DAY 22   Historical Provider, MD   furosemide (LASIX) 20 MG tablet Take 20 mg by mouth daily    Historical Provider, MD   warfarin (COUMADIN) 2.5 MG tablet Take 4 mg by mouth daily Indications: T-Th-Sat - Sun Daily per INR levels    Historical Provider, MD   nitroGLYCERIN (NITROSTAT) 0.4 MG SL tablet Place 0.4 mg under the tongue every 5 minutes as needed for Chest pain up to max of 3 total doses. If no relief after 1 dose, call 911. Historical Provider, MD   Omega-3 Fatty Acids (FISH OIL) 1200 MG CAPS Take by mouth daily    Historical Provider, MD   Cholecalciferol (VITAMIN D3) 125 MCG (5000 UT) TABS Take by mouth daily    Historical Provider, MD   CPAP Machine MISC by Does not apply route    Historical Provider, MD   warfarin (COUMADIN) 5 MG tablet Take 5 mg by mouth daily Indications: Bkgnot-Ygcklcbsj-Pfjarg Daily per INR levels 2/26/13   Historical Provider, MD   aspirin 81 MG EC tablet Take 81 mg by mouth daily.       Historical Provider, MD       Current medications:    Current Facility-Administered Medications   Medication Dose Route Frequency Provider Last Rate Last Admin    sodium chloride 0.9 % infusion             0.9 % sodium chloride infusion   IntraVENous Once Orvel MD Jonny        vancomycin (VANCOCIN) 750 mg in sodium chloride 0.9 % 250 mL IVPB  750 mg IntraVENous Q12H Jethro Villalobos MD   Stopped at 02/20/23 2217    metoprolol tartrate (LOPRESSOR) tablet 25 mg  25 mg Oral BID Ronnald Malling Sedar, DO   25 mg at 02/20/23 2118    rosuvastatin (CRESTOR) tablet 10 mg  10 mg Oral Daily Ronnald Malling Sedar, DO   10 mg at 02/20/23 0845    [Held by provider] losartan (COZAAR) tablet 100 mg  100 mg Oral Daily Ronnald Malling Sedar, DO        [Held by provider] furosemide (LASIX) tablet 20 mg  20 mg Oral Daily Ronnald Malling Sedar, DO        [Held by provider] aspirin EC tablet 81 mg  81 mg Oral Daily Agustin D Sedar, DO        pantoprazole (PROTONIX) 40 mg in sodium chloride (PF) 0.9 % 10 mL injection  40 mg IntraVENous Q12H Timoteo Chow MD   40 mg at 02/21/23 0308    ondansetron (ZOFRAN-ODT) disintegrating tablet 4 mg  4 mg Oral Q8H PRN Ronnald Malling Sedar, DO        Or    ondansetron [facility]) injection 4 mg  4 mg IntraVENous Q6H PRN Ronnald Malling Sedar, DO        meropenem (MERREM) 1,000 mg in sodium chloride 0.9 % 100 mL IVPB (mini-bag)  1,000 mg IntraVENous Q8H Agustin RYAN Sedar, DO   Stopped at 02/21/23 0018    melatonin disintegrating tablet 5 mg  5 mg Oral Nightly Aletha Bjornstad Sedar, DO   5 mg at 02/20/23 2118    guaiFENesin [facility]) extended release tablet 600 mg  600 mg Oral BID Aletha Bjornstad Sedar, DO   600 mg at 02/20/23 2118    ipratropium-albuterol (DUONEB) nebulizer solution 1 ampule  1 ampule Inhalation Q4H PRN Olga Mukherjee DO        vancomycin [facility]) intermittent dosing (placeholder)   Other RX Placeholder Alberroni Bjornstad Sedar, DO        oxyCODONE (ROXICODONE) immediate release tablet 5 mg  5 mg Oral Q4H PRN Melissa Carrasco MD        Or    oxyCODONE (ROXICODONE) immediate release tablet 10 mg  10 mg Oral Q4H PRN Melissa Carrasco MD   10 mg at 02/20/23 1313    naloxegol (MOVANTIK) tablet 25 mg  25 mg Oral QAM Melissa Carrasco MD   25 mg at 02/20/23 0844     Facility-Administered Medications Ordered in Other Encounters   Medication Dose Route Frequency Provider Last Rate Last Admin    sodium chloride flush 0.9 % injection 10 mL  10 mL IntraVENous PRN Tonya Alfaro MD   10 mL at 07/10/19 1220       Allergies:     Allergies   Allergen Reactions    Benadryl [Diphenhydramine Hcl] Anxiety    Diphenhydramine Anxiety     anxious       Problem List:    Patient Active Problem List   Diagnosis Code    Ventral hernia K43.9    Ventral hernia, recurrent K43.2    Polycythemia D75.1    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    Atherosclerosis of native artery of both lower extremities with intermittent claudication (HCC) I70.213    Atrial fibrillation (HCC) I48.91    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.1, N13.8    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    Venous insufficiency I87.2    Intermittent claudication (HCC) I73.9    Transient ischemic attack G45.9    Sleep apnea G47.30    Rosacea L71.9    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    Excessive daytime sleepiness G47.19    Hx of blood clots Z86.718    Former smoker Z87.891    Lumbar stenosis with neurogenic claudication M48.062    Congestive heart failure (HCC) I50.9    Lumbar spondylosis M47.816    Balance disorder R26.89    Postlaminectomy syndrome, lumbar region M96.1    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.9    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    Mixed hyperlipidemia E78.2    Sick sinus syndrome ([address] I49.5    Presence of cardiac pacemaker Z95.0    Lumbar post-laminectomy syndrome M96.1    Pleural effusion J90    Melena K92.1       Past Medical History:        Diagnosis Date    A-fib ([address]     approx 1 year ago-cardioverted    Arthritis     Baker's cyst of knee, left     Cancer ([address]     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    Cerebral artery occlusion with cerebral infarction ([address] 2000    TIA sx felt funny --     Congestive heart failure ([address] 03/08/2022    Coronary artery disease     HTN (hypertension)     meds > 20 yrs    Hx of blood clots 2012    DVT left leg     Hyperlipidemia     meds > 20 yrs    Pacemaker 07/19/2022    Polycythemia     Torn meniscus     left knee       Past Surgical History:        Procedure Laterality Date    BACK SURGERY      COLONOSCOPY  2009    COLONOSCOPY  2012    CORONARY ANGIOPLASTY WITH STENT PLACEMENT  10/2006    x 1 cardiac stent    ENDOSCOPY, COLON, DIAGNOSTIC      EYE SURGERY      [place] with IOL OU    HERNIA REPAIR  03/2013    redo ca mesh in FLF.7010 -- umbilical hernia    JOINT REPLACEMENT Bilateral 2009 & 2011    Bilateral TKR    KNEE SURGERY Left      arthroscopic surgery to repair meniscus of left knee    LAMINECTOMY N/A 02/08/2021    RIGHT BILATERAL L2-3 3-4 4-5 MICRODECOMPRESSION performed by Dior Mcneil MD at [facility] 55 implanted 7/19/22    PAIN MANAGEMENT PROCEDURE N/A 2/16/2023    ATTEMPTED SPINAL CORD STIMULATOR PERMANENT PLACEMENT performed by Iván Cowart MD at [address] N/A 1/10/2023    SPINAL CORD STIMULATOR TRIAL performed by Branden Hylton Lynn Chavez MD at [address] [facility]  age 13    UMBILICAL HERNIA REPAIR  2012       Social History:    Social History     Tobacco Use    Smoking status: Former     Packs/day: 0.50     Years: 10.00     Pack years: 5.00     Types: Cigarettes     Quit date: 1968     Years since quittin.8    Smokeless tobacco: Never   Substance Use Topics    Alcohol use: Yes     Comment: social                                Counseling given: Not Answered      Vital Signs (Current):   Vitals:    23 0815 23 0845 23 1411 23 1930   BP: 117/60   (!) 11258   Pulse: 75   78   Resp:     Temp: 36.8 °C (98.2 °F)   36.9 °C (98.5 °F)   TempSrc:    Oral   SpO2: 95%  95% 92%   Weight:       Height:                                                  BP Readings from Last 3 Encounters:   23 (!) 112/58   23 125/78   23 (!) 153/74       NPO Status: Time of last liquid consumption:  (patient states he has a small sip around 730 on  this morning, \"just enough to wet my lips\")                        Time of last solid consumption:                         Date of last liquid consumption: 23                        Date of last solid food consumption: 23    BMI:   Wt Readings from Last 3 Encounters:   23 269 lb (122 kg)   23 264 lb (119.7 kg)   23 264 lb (119.7 kg)     Body mass index is 42.13 kg/m².     CBC:   Lab Results   Component Value Date/Time    WBC 12.1 2023 07:06 AM    RBC 2.80 2023 07:06 AM    HGB 8.2 2023 07:06 AM    HCT 25.1 2023 07:06 AM    MCV 89.5 2023 07:06 AM    RDW 15.3 2023 07:06 AM     2023 07:06 AM       CMP:   Lab Results   Component Value Date/Time     2023 07:06 AM    K 4.3 2023 07:06 AM     2023 07:06 AM    CO2 21 2023 07:06 AM    BUN 35 2023 07:06 AM    CREATININE 1.51 2023 07:06 AM    GFRAA >60.0 2021 03:50 PM    LABGLOM 44.8 02/21/2023 07:06 AM    GLUCOSE 118 02/21/2023 07:06 AM    GLUCOSE 85 04/25/2012 01:30 PM    PROT 5.7 02/19/2023 05:27 AM    CALCIUM 8.8 02/21/2023 07:06 AM    BILITOT 0.3 02/19/2023 05:27 AM    ALKPHOS 73 02/19/2023 05:27 AM    AST 27 02/19/2023 05:27 AM    ALT 12 02/19/2023 05:27 AM       POC Tests: No results for input(s): POCGLU, POCNA, POCK, POCCL, POCBUN, POCHEMO, POCHCT in the last 72 hours. Coags:   Lab Results   Component Value Date/Time    PROTIME 17.2 02/18/2023 05:08 AM    INR 1.4 02/18/2023 05:08 AM    APTT 67.6 02/09/2023 12:58 PM       HCG (If Applicable): No results found for: PREGTESTUR, PREGSERUM, HCG, HCGQUANT     ABGs: No results found for: PHART, PO2ART, BEL5VXI, EOX9FLK, BEART, B2JHVRVO     Type & Screen (If Applicable):  No results found for: LABABO, LABRH    Drug/Infectious Status (If Applicable):  No results found for: HIV, HEPCAB    COVID-19 Screening (If Applicable):   Lab Results   Component Value Date/Time    COVID19 Not Detected 02/02/2021 06:41 PM           Anesthesia Evaluation  Patient summary reviewed and Nursing notes reviewed  Airway: Mallampati: II  TM distance: >3 FB   Neck ROM: full  Mouth opening: > = 3 FB   Dental:          Pulmonary:normal exam  breath sounds clear to auscultation  (+) sleep apnea:                             Cardiovascular:    (+) hypertension:, pacemaker: pacemaker, CAD:, CABG/stent:, dysrhythmias: atrial fibrillation, CHF:, hyperlipidemia      ECG reviewed  Rhythm: regular  Rate: normal                    Neuro/Psych:   (+) CVA:, TIA,             GI/Hepatic/Renal:   (+) morbid obesity          Endo/Other:    (+) blood dyscrasia: anemia, arthritis:., .                 Abdominal:   (+) obese,           Vascular:   + PVD, aortic or cerebral, . Other Findings:           Anesthesia Plan      MAC     ASA 4 - emergent       Induction: intravenous. Anesthetic plan and risks discussed with patient.       Plan discussed with attending.                     Corrie Velez, APRN - CRNA   2/21/2023

## 2023-02-21 NOTE — PROGRESS NOTES
Hospitalist Progress Note      PCP: Lavell Grimes DO    Date of Admission: 2/18/2023    Chief Complaint:    No chief complaint on file. Subjective:  Patient denies fevers, chills, sweats, CP, SOB, diarrhea or burning micturition. Resting in bed, feels improved. 12 point ROS negative other than mentioned above     Medications:  Reviewed    Infusion Medications   Scheduled Medications    vancomycin  750 mg IntraVENous Q12H    metoprolol tartrate  25 mg Oral BID    rosuvastatin  10 mg Oral Daily    [Held by provider] losartan  100 mg Oral Daily    [Held by provider] furosemide  20 mg Oral Daily    [Held by provider] aspirin  81 mg Oral Daily    pantoprazole (PROTONIX) 40 mg injection  40 mg IntraVENous Q12H    meropenem  1,000 mg IntraVENous Q8H    melatonin  5 mg Oral Nightly    guaiFENesin  600 mg Oral BID    vancomycin (VANCOCIN) intermittent dosing (placeholder)   Other RX Placeholder    naloxegol  25 mg Oral QAM     PRN Meds: ondansetron **OR** ondansetron, ipratropium-albuterol, oxyCODONE **OR** oxyCODONE      Intake/Output Summary (Last 24 hours) at 2/20/2023 2300  Last data filed at 2/20/2023 2118  Gross per 24 hour   Intake 1172.38 ml   Output 730 ml   Net 442.38 ml       Exam:    BP (!) 112/58   Pulse 78   Temp 98.5 °F (36.9 °C) (Oral)   Resp 20   Ht 5' 7\" (1.702 m)   Wt 269 lb (122 kg)   SpO2 92%   BMI 42.13 kg/m²     General appearance: No apparent distress, appears stated age and cooperative. HEENT:  Conjunctivae/corneas clear. Neck: Trachea midline. Respiratory:  Normal respiratory effort. Clear to auscultation  Cardiovascular: Regular rate and rhythm  Abdomen: Soft, non-tender, non-distended with normal bowel sounds.   Musculoskeletal: No clubbing, cyanosis or edema bilaterally  Neuro: Non Focal.   Capillary Refill: Brisk,< 3 seconds   Peripheral Pulses: +2 palpable, equal bilaterally     Labs:   Recent Labs     02/18/23  0508 02/19/23  0527 02/20/23  0527   WBC 12.2* 10.5 13.4*   HGB 8.4* 8.4* 8.2*   HCT 25.1* 25.8* 24.9*    213 199     Recent Labs     02/18/23  0508 02/19/23  0527 02/20/23  0527    139 136   K 5.2* 4.5 4.4    106 103   CO2 22 19* 21   BUN 31* 36* 37*   CREATININE 1.95* 1.58* 1.44*   CALCIUM 9.0 8.7 8.6     Recent Labs     02/19/23  0527   AST 27   ALT 12   BILIDIR <0.2   BILITOT 0.3   ALKPHOS 73     Recent Labs     02/18/23  0508   INR 1.4     No results for input(s): [place] Knock in the last 72 hours. Urinalysis:      Lab Results   Component Value Date/Time    NITRU Negative 02/19/2023 03:13 PM    WBCUA 3-5 02/19/2023 03:13 PM    BACTERIA Negative 02/19/2023 03:13 PM    RBCUA 6-10 02/19/2023 03:13 PM    BLOODU TRACE 02/19/2023 03:13 PM    SPECGRAV 1.028 02/19/2023 03:13 PM    GLUCOSEU Negative 02/19/2023 03:13 PM       Radiology:  US DUP UPPER EXTREMITY RIGHT VENOUS   Final Result   No evidence of DVT. XR CHEST PORTABLE   Final Result   1. Minimal partial clearing of the left lung. The previously noted left   pleural effusion is smaller   2. Stable position of the left chest tube   3. The right lung is clear. XR CHEST PORTABLE   Final Result   1. Apparent interval placement of left chest tube catheter. No pneumothorax. 2.  Persistent opacification of the left hemithorax. XR CHEST PORTABLE   Final Result   1. Near complete whiteout of the left hemithorax likely represent a   combination of partial collapse of the left lung and large pleural effusion   2. Cardiomegaly   3. The right lung is grossly clear. XR ABDOMEN (KUB) (SINGLE AP VIEW)   Final Result   1. Complete opacification of left hemithorax compatible with some   combination of pleural effusion and atelectasis. Secondary obscuration of   left heart border. 2.  Nonobstructive bowel gas pattern. No acute abdominal disease identified. 3.  Probable left renal stone.              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  Continue metoprolol for rate control.  Chronic pain with spinal stimulator: Consult to pain management   Sick sinus syndrome status post pacemaker: Continue beta-blocker  SCOTTY escalate to BiPAP before surgery.  5 L bleed in O2  Polycythemia: Follow CBC.  Stat CBC now    Dispo - 2/20 - await gi recs, home o2 eval, dc planning    Active Hospital Problems    Diagnosis Date Noted    Melena [K92.1] 02/20/2023     Priority: Medium    Pleural effusion [J90] 02/18/2023     Priority: Medium        Additional work up or/and treatment plan may be added today or then after based on clinical progression. I am managing a portion of pt care. Some medical issues are handled by other specialists. Additional work up and treatment should be done in out pt setting by pt PCP and other out pt providers.     In addition to examining and evaluating pt, I spent additional time explaining care, normal and abnormal findings, and treatment plan. All of pt questions were answered. Counseling, diet and education were  provided. Case will be discussed with nursing staff when appropriate. Family will be updated if and when appropriate.      Diet: ADULT DIET; Regular  Diet NPO Exceptions are: Sips of Water with Meds    Code Status: Full Code    PT/OT Eval     Electronically signed by GISELLE HOLLIDAY MD on 2/20/2023 at 11:00 PM

## 2023-02-21 NOTE — CARE COORDINATION
This LSW met with patient at bedside this am. Discharge plans discussed. Patient will return home with [facility] and home 02 through [facility] when medically cleared. LSW / CM to follow. Electronically signed by ANALY Marshall LSW on 2/21/23 at 12:11 PM EST    Home 02 ordered approved by Omi Knight at [facility]. Sequel to be given to patient upon discharge. LSW /CM to follow.   Electronically signed by ANALY Marshall LSW on 2/21/23 at 1:39 PM EST

## 2023-02-21 NOTE — PROGRESS NOTES
Nephrology Progress Note    Assessment:  82 y.o. male with history s/f A.fib, mesothelioma s/p radiation, TIA x2, CHF, HTN, HLD and polycythemia who was transferred to [facility] from [address] [place] [address] for thoracotomy/VATS decortication due to LL collapse. ZHEN on CKD stage III: p/w Scr 1.9, ?  If 2/2 volume depletion, improved back to baseline, ~1.4-1.5 w/ eGFR mid/high 40s, CKD risk factors CHF, HTN, doesn't see a nephrologist   Large LT sided pleura effusion: s/p chest tube (0/44)  Metabolic acidosis  Anemia: hemo-onc onboard  HTN  Mesothelioma  Chronic pain w/ spinal stimulator     Plan:  - function back to baseline, monitor   - ok to f/u w/ us in 3-4 weeks  - will sign off      Patient Active Problem List:     Ventral hernia     Ventral hernia, recurrent     Polycythemia     Spinal stenosis of lumbar region with neurogenic claudication     Atherosclerosis of native artery of both lower extremities with intermittent claudication (HCC)     Atrial fibrillation (HCC)     BPH with obstruction/lower urinary tract symptoms     Congenital cystic kidney disease     Venous insufficiency     Intermittent claudication (HCC)     Transient ischemic attack     Sleep apnea     Rosacea     Fuchs' corneal dystrophy     Excessive daytime sleepiness     Hx of blood clots     Former smoker     Lumbar stenosis with neurogenic claudication     Congestive heart failure (HCC)     Lumbar spondylosis     Balance disorder     Postlaminectomy syndrome, lumbar region     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     Mixed hyperlipidemia     Sick sinus syndrome ([facility]     Presence of cardiac pacemaker     Lumbar post-laminectomy syndrome     Pleural effusion      Subjective:  Admit Date: 2/18/2023    Interval History: function stable, discussed w/ patient and family at bedside     Medications:  Scheduled Meds:   vancomycin  750 mg IntraVENous Q12H    metoprolol tartrate  25 mg Oral BID    rosuvastatin  10 mg Oral Daily [Held by provider] losartan  100 mg Oral Daily    [Held by provider] furosemide  20 mg Oral Daily    [Held by provider] aspirin  81 mg Oral Daily    pantoprazole (PROTONIX) 40 mg injection  40 mg IntraVENous Q12H    meropenem  1,000 mg IntraVENous Q8H    melatonin  5 mg Oral Nightly    guaiFENesin  600 mg Oral BID    vancomycin (VANCOCIN) intermittent dosing (placeholder)   Other RX Placeholder    naloxegol  25 mg Oral QAM     Continuous Infusions:    CBC:   Recent Labs     02/20/23  0527 02/21/23  0706   WBC 13.4* 12.1*   HGB 8.2* 8.2*    238       CMP:    Recent Labs     02/19/23  0527 02/20/23  0527 02/21/23  0706    136 137   K 4.5 4.4 4.3    103 104   CO2 19* 21 21   BUN 36* 37* 35*   CREATININE 1.58* 1.44* 1.51*   GLUCOSE 121* 110* 118*   CALCIUM 8.7 8.6 8.8   LABGLOM 42.4* 47.4* 44.8*       Troponin: No results for input(s): TROPONINI in the last 72 hours. BNP: No results for input(s): BNP in the last 72 hours. INR:   No results for input(s): INR in the last 72 hours. Lipids: No results for input(s): CHOL, LDLDIRECT, TRIG, HDL, AMYLASE, LIPASE in the last 72 hours. Liver:   Recent Labs     02/19/23  0527   AST 27   ALT 12   ALKPHOS 73   PROT 5.7*   LABALBU 3.1*   BILITOT 0.3       Iron:    Recent Labs     02/19/23  0527   FERRITIN 129       Urinalysis: No results for input(s): UA in the last 72 hours.     Objective:  Vitals: /61   Pulse 82   Temp 100.2 °F (37.9 °C) (Temporal)   Resp 20   Ht 5' 7\" (1.702 m)   Wt 269 lb (122 kg)   SpO2 99%   BMI 42.13 kg/m²    Wt Readings from Last 3 Encounters:   02/21/23 269 lb (122 kg)   02/16/23 264 lb (119.7 kg)   02/14/23 264 lb (119.7 kg)      24HR INTAKE/OUTPUT:    Intake/Output Summary (Last 24 hours) at 2/21/2023 1323  Last data filed at 2/21/2023 4078  Gross per 24 hour   Intake 340 ml   Output 330 ml   Net 10 ml         General: alert, in no apparent distress  HEENT: normocephalic, atraumatic, anicteric  Lungs: non-labored respirations, clear to auscultation bilaterally, + chest tube  Heart: regular rate and rhythm, no murmurs or rubs  Abdomen: soft, non-tender, distended   Ext: no cyanosis, no peripheral edema  Neuro: alert and oriented, no gross abnormalities      Electronically signed by Austin Dumont MD, MD

## 2023-02-21 NOTE — ANESTHESIA POSTPROCEDURE EVALUATION
Department of Anesthesiology  Postprocedure Note    Patient: Alma Delia Sandoval  MRN: [id number]  YOB: 1935  Date of evaluation: 2/21/2023      Procedure Summary     Date: 02/21/23 Room / Location: [address] [place] [address]    Anesthesia Start: 0247 Anesthesia Stop:     Procedure: EGD DIAGNOSTIC ONLY Diagnosis:       Anemia, unspecified type      Melena    Surgeons: Kym Lan MD Responsible Provider: EDELMIRA Levy CRNA    Anesthesia Type: MAC ASA Status: 4 - Emergent          Anesthesia Type: No value filed.     Shae Phase I: Shae Score: 10    Shae Phase II:        Anesthesia Post Evaluation    Patient location during evaluation: bedside  Patient participation: complete - patient participated  Level of consciousness: awake and awake and alert  Pain score: 0  Airway patency: patent  Nausea & Vomiting: no nausea and no vomiting  Complications: no  Cardiovascular status: blood pressure returned to baseline and hemodynamically stable  Respiratory status: acceptable and spontaneous ventilation  Hydration status: euvolemic

## 2023-02-21 NOTE — PROGRESS NOTES
Pharmacy Vancomycin Consult     Vancomycin Day: 4  Current Dosing: vanco 750mg IV q12h    Recent Labs     02/20/23  0527 02/21/23  0706   BUN 37* 35*   CREATININE 1.44* 1.51*   WBC 13.4* 12.1*       Intake/Output Summary (Last 24 hours) at 2/21/2023 1220  Last data filed at 2/21/2023 1594  Gross per 24 hour   Intake 340 ml   Output 330 ml   Net 10 ml     Cultures  No results for input(s): BC, BLOODCULT2, [address] [place] [address] Sw, RESPCULTURE, LABGRAM, ORG, CXSURG, WNDABS, LABANAE, LABURIN, SPNEUAGU, HSVSRC, FLUA, FLUB, AFBSMEAR, CXST, FUNGUSSMEAR, LABLEGI, VRECX, CDIFPCR, [id number], GIAAGS, ROTA, FECLEU, COVID19 in the last 720 hours. Height: 5' 7\" (170.2 cm), Weight: 269 lb (122 kg), Body mass index is 42.13 kg/m². Estimated Creatinine Clearance: 45 mL/min (A) (based on SCr of 1.51 mg/dL (H)). .    Trough:  Recent Labs     02/20/23  0527   VANCORANDOM 8.3*      Assessment/Plan:  Data input into [employer] platform. Current dosing therapeutic for goal, predicted through 16.5 Level tomorrow am to further assess dosing with slight bump in creatinine. Timing of future trough levels may be adjusted based on culture results, renal function, and clinical response.     Thank you,  Taylor Patterson, [facility] PharmD

## 2023-02-21 NOTE — PROGRESS NOTES
Hospitalist Progress Note      PCP: Nathalia Jaramillo DO    Date of Admission: 2/18/2023    Chief Complaint:    No chief complaint on file. Subjective:  Patient denies fevers, chills, sweats, CP, SOB, diarrhea or burning micturition. Resting in bed, feels improved. 12 point ROS negative other than mentioned above     Medications:  Reviewed    Infusion Medications   Scheduled Medications    vancomycin  750 mg IntraVENous Q12H    metoprolol tartrate  25 mg Oral BID    rosuvastatin  10 mg Oral Daily    [Held by provider] losartan  100 mg Oral Daily    [Held by provider] furosemide  20 mg Oral Daily    [Held by provider] aspirin  81 mg Oral Daily    pantoprazole (PROTONIX) 40 mg injection  40 mg IntraVENous Q12H    meropenem  1,000 mg IntraVENous Q8H    melatonin  5 mg Oral Nightly    guaiFENesin  600 mg Oral BID    vancomycin (VANCOCIN) intermittent dosing (placeholder)   Other RX Placeholder    naloxegol  25 mg Oral QAM     PRN Meds: ondansetron **OR** ondansetron, ipratropium-albuterol, oxyCODONE **OR** oxyCODONE      Intake/Output Summary (Last 24 hours) at 2/21/2023 1440  Last data filed at 2/21/2023 0852  Gross per 24 hour   Intake 340 ml   Output 330 ml   Net 10 ml       Exam:    /61   Pulse 82   Temp 100.2 °F (37.9 °C) (Temporal)   Resp 20   Ht 5' 7\" (1.702 m)   Wt 269 lb (122 kg)   SpO2 99%   BMI 42.13 kg/m²     General appearance: No apparent distress, appears stated age and cooperative. HEENT:  Conjunctivae/corneas clear. Neck: Trachea midline. Respiratory:  Normal respiratory effort. Clear to auscultation  Cardiovascular: Regular rate and rhythm  Abdomen: Soft, non-tender, non-distended with normal bowel sounds.   Musculoskeletal: No clubbing, cyanosis or edema bilaterally  Neuro: Non Focal.   Capillary Refill: Brisk,< 3 seconds   Peripheral Pulses: +2 palpable, equal bilaterally     Labs:   Recent Labs     02/19/23  0527 02/20/23  0527 02/21/23  0706   WBC 10.5 13.4* 12.1*   HGB 8.4* 8.2* 8.2*   HCT 25.8* 24.9* 25.1*    199 238     Recent Labs     02/19/23  0527 02/20/23  0527 02/21/23  0706    136 137   K 4.5 4.4 4.3    103 104   CO2 19* 21 21   BUN 36* 37* 35*   CREATININE 1.58* 1.44* 1.51*   CALCIUM 8.7 8.6 8.8     Recent Labs     02/19/23  0527   AST 27   ALT 12   BILIDIR <0.2   BILITOT 0.3   ALKPHOS 73     No results for input(s): INR in the last 72 hours. No results for input(s): Ariella Smack in the last 72 hours. Urinalysis:      Lab Results   Component Value Date/Time    NITRU Negative 02/19/2023 03:13 PM    WBCUA 3-5 02/19/2023 03:13 PM    BACTERIA Negative 02/19/2023 03:13 PM    RBCUA 6-10 02/19/2023 03:13 PM    BLOODU TRACE 02/19/2023 03:13 PM    SPECGRAV 1.028 02/19/2023 03:13 PM    GLUCOSEU Negative 02/19/2023 03:13 PM       Radiology:  US DUP UPPER EXTREMITY RIGHT VENOUS   Final Result   No evidence of DVT. XR CHEST PORTABLE   Final Result   1. Minimal partial clearing of the left lung. The previously noted left   pleural effusion is smaller   2. Stable position of the left chest tube   3. The right lung is clear. XR CHEST PORTABLE   Final Result   1. Apparent interval placement of left chest tube catheter. No pneumothorax. 2.  Persistent opacification of the left hemithorax. XR CHEST PORTABLE   Final Result   1. Near complete whiteout of the left hemithorax likely represent a   combination of partial collapse of the left lung and large pleural effusion   2. Cardiomegaly   3. The right lung is grossly clear. XR ABDOMEN (KUB) (SINGLE AP VIEW)   Final Result   1. Complete opacification of left hemithorax compatible with some   combination of pleural effusion and atelectasis. Secondary obscuration of   left heart border. 2.  Nonobstructive bowel gas pattern. No acute abdominal disease identified. 3.  Probable left renal stone.              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 Monitor on clears overnight, probable dc home tomorrow. atrial fibrillation: Hold warfarin for now. Continue metoprolol for rate control. Chronic pain with spinal stimulator: Consult to pain management   Sick sinus syndrome status post pacemaker: Continue beta-blocker  SCOTTY escalate to BiPAP before surgery. 5 L bleed in O2  Polycythemia: Follow CBC. Stat CBC now    Dispo - 2/20 - await gi recs, home o2 eval, dc planning    Active Hospital Problems    Diagnosis Date Noted    Gastritis without bleeding [K29.70] 02/21/2023     Priority: Medium    Melena [K92.1] 02/20/2023     Priority: Medium    Pleural effusion [J90] 02/18/2023     Priority: Medium        Additional work up or/and treatment plan may be added today or then after based on clinical progression. I am managing a portion of pt care. Some medical issues are handled by other specialists. Additional work up and treatment should be done in out pt setting by pt PCP and other out pt providers. In addition to examining and evaluating pt, I spent additional time explaining care, normal and abnormal findings, and treatment plan. All of pt questions were answered. Counseling, diet and education were  provided. Case will be discussed with nursing staff when appropriate. Family will be updated if and when appropriate. Diet: ADULT DIET;  Full Liquid    Code Status: Full Code    PT/OT Eval     Electronically signed by Mir Engel MD on 2/21/2023 at 2:40 PM

## 2023-02-22 ENCOUNTER — APPOINTMENT (OUTPATIENT)
Dept: CT IMAGING | Age: 88
DRG: 186 | End: 2023-02-22
Attending: INTERNAL MEDICINE
Payer: MEDICARE

## 2023-02-22 LAB
ANION GAP SERPL CALCULATED.3IONS-SCNC: 13 MEQ/L (ref 9–15)
BASOPHILS ABSOLUTE: 0 K/UL (ref 0–0.2)
BASOPHILS RELATIVE PERCENT: 0.4 %
BUN BLDV-MCNC: 31 MG/DL (ref 8–23)
CALCIUM SERPL-MCNC: 8.6 MG/DL (ref 8.5–9.9)
CHLORIDE BLD-SCNC: 108 MEQ/L (ref 95–107)
CO2: 20 MEQ/L (ref 20–31)
CREAT SERPL-MCNC: 1.4 MG/DL (ref 0.7–1.2)
EOSINOPHILS ABSOLUTE: 0.3 K/UL (ref 0–0.7)
EOSINOPHILS RELATIVE PERCENT: 3.6 %
GFR SERPL CREATININE-BSD FRML MDRD: 49 ML/MIN/{1.73_M2}
GLUCOSE BLD-MCNC: 105 MG/DL (ref 70–99)
HCT VFR BLD CALC: 22.9 % (ref 42–52)
HEMOGLOBIN: 7.5 G/DL (ref 14–18)
INR BLD: 1.3
LYMPHOCYTES ABSOLUTE: 0.5 K/UL (ref 1–4.8)
LYMPHOCYTES RELATIVE PERCENT: 5.5 %
MCH RBC QN AUTO: 29.6 PG (ref 27–31.3)
MCHC RBC AUTO-ENTMCNC: 32.9 % (ref 33–37)
MCV RBC AUTO: 89.8 FL (ref 79–92.2)
MONOCYTES ABSOLUTE: 0.8 K/UL (ref 0.2–0.8)
MONOCYTES RELATIVE PERCENT: 9.8 %
NEUTROPHILS ABSOLUTE: 6.9 K/UL (ref 1.4–6.5)
NEUTROPHILS RELATIVE PERCENT: 80.7 %
PDW BLD-RTO: 15.3 % (ref 11.5–14.5)
PLATELET # BLD: 208 K/UL (ref 130–400)
POTASSIUM REFLEX MAGNESIUM: 3.9 MEQ/L (ref 3.4–4.9)
PROTHROMBIN TIME: 16.6 SEC (ref 12.3–14.9)
RBC # BLD: 2.55 M/UL (ref 4.7–6.1)
SODIUM BLD-SCNC: 141 MEQ/L (ref 135–144)
VANCOMYCIN RANDOM: 14.5 UG/ML (ref 10–40)
WBC # BLD: 8.5 K/UL (ref 4.8–10.8)

## 2023-02-22 PROCEDURE — 36415 COLL VENOUS BLD VENIPUNCTURE: CPT

## 2023-02-22 PROCEDURE — 2580000003 HC RX 258: Performed by: INTERNAL MEDICINE

## 2023-02-22 PROCEDURE — A4216 STERILE WATER/SALINE, 10 ML: HCPCS | Performed by: INTERNAL MEDICINE

## 2023-02-22 PROCEDURE — 6360000002 HC RX W HCPCS: Performed by: INTERNAL MEDICINE

## 2023-02-22 PROCEDURE — 80048 BASIC METABOLIC PNL TOTAL CA: CPT

## 2023-02-22 PROCEDURE — 80202 ASSAY OF VANCOMYCIN: CPT

## 2023-02-22 PROCEDURE — 99223 1ST HOSP IP/OBS HIGH 75: CPT | Performed by: INTERNAL MEDICINE

## 2023-02-22 PROCEDURE — 99232 SBSQ HOSP IP/OBS MODERATE 35: CPT | Performed by: NURSE PRACTITIONER

## 2023-02-22 PROCEDURE — 2700000000 HC OXYGEN THERAPY PER DAY

## 2023-02-22 PROCEDURE — 85025 COMPLETE CBC W/AUTO DIFF WBC: CPT

## 2023-02-22 PROCEDURE — 6370000000 HC RX 637 (ALT 250 FOR IP): Performed by: FAMILY MEDICINE

## 2023-02-22 PROCEDURE — 6370000000 HC RX 637 (ALT 250 FOR IP): Performed by: INTERNAL MEDICINE

## 2023-02-22 PROCEDURE — 99233 SBSQ HOSP IP/OBS HIGH 50: CPT | Performed by: INTERNAL MEDICINE

## 2023-02-22 PROCEDURE — 85610 PROTHROMBIN TIME: CPT

## 2023-02-22 PROCEDURE — 6360000002 HC RX W HCPCS: Performed by: FAMILY MEDICINE

## 2023-02-22 PROCEDURE — 71250 CT THORAX DX C-: CPT

## 2023-02-22 PROCEDURE — 1210000000 HC MED SURG R&B

## 2023-02-22 PROCEDURE — C9113 INJ PANTOPRAZOLE SODIUM, VIA: HCPCS | Performed by: INTERNAL MEDICINE

## 2023-02-22 RX ORDER — WARFARIN SODIUM 2 MG/1
4 TABLET ORAL
Status: DISCONTINUED | OUTPATIENT
Start: 2023-02-23 | End: 2023-02-25 | Stop reason: HOSPADM

## 2023-02-22 RX ORDER — WARFARIN SODIUM 5 MG/1
5 TABLET ORAL
Status: DISCONTINUED | OUTPATIENT
Start: 2023-02-22 | End: 2023-02-25 | Stop reason: HOSPADM

## 2023-02-22 RX ORDER — ENOXAPARIN SODIUM 150 MG/ML
1 INJECTION SUBCUTANEOUS 2 TIMES DAILY
Status: DISCONTINUED | OUTPATIENT
Start: 2023-02-22 | End: 2023-02-25

## 2023-02-22 RX ADMIN — MEROPENEM 1000 MG: 1 INJECTION, POWDER, FOR SOLUTION INTRAVENOUS at 11:11

## 2023-02-22 RX ADMIN — SODIUM CHLORIDE, PRESERVATIVE FREE 40 MG: 5 INJECTION INTRAVENOUS at 15:56

## 2023-02-22 RX ADMIN — ROSUVASTATIN CALCIUM 10 MG: 10 TABLET, FILM COATED ORAL at 11:07

## 2023-02-22 RX ADMIN — ACETAMINOPHEN 650 MG: 325 TABLET ORAL at 15:45

## 2023-02-22 RX ADMIN — METOPROLOL TARTRATE 25 MG: 25 TABLET, FILM COATED ORAL at 11:08

## 2023-02-22 RX ADMIN — NALOXEGOL OXALATE 25 MG: 12.5 TABLET, FILM COATED ORAL at 11:07

## 2023-02-22 RX ADMIN — METOPROLOL TARTRATE 25 MG: 25 TABLET, FILM COATED ORAL at 20:29

## 2023-02-22 RX ADMIN — GUAIFENESIN 600 MG: 600 TABLET ORAL at 20:28

## 2023-02-22 RX ADMIN — GUAIFENESIN 600 MG: 600 TABLET ORAL at 11:07

## 2023-02-22 RX ADMIN — OXYCODONE 5 MG: 5 TABLET ORAL at 20:29

## 2023-02-22 RX ADMIN — Medication 5 MG: at 20:28

## 2023-02-22 RX ADMIN — MEROPENEM 1000 MG: 1 INJECTION, POWDER, FOR SOLUTION INTRAVENOUS at 00:17

## 2023-02-22 RX ADMIN — SODIUM CHLORIDE, PRESERVATIVE FREE 40 MG: 5 INJECTION INTRAVENOUS at 04:02

## 2023-02-22 RX ADMIN — VANCOMYCIN HYDROCHLORIDE 1250 MG: 5 INJECTION, POWDER, LYOPHILIZED, FOR SOLUTION INTRAVENOUS at 21:52

## 2023-02-22 RX ADMIN — ENOXAPARIN SODIUM 120 MG: 150 INJECTION SUBCUTANEOUS at 20:29

## 2023-02-22 RX ADMIN — WARFARIN SODIUM 5 MG: 5 TABLET ORAL at 18:29

## 2023-02-22 RX ADMIN — MEROPENEM 1000 MG: 1 INJECTION, POWDER, FOR SOLUTION INTRAVENOUS at 18:27

## 2023-02-22 ASSESSMENT — PAIN DESCRIPTION - DESCRIPTORS: DESCRIPTORS: DISCOMFORT

## 2023-02-22 ASSESSMENT — PAIN DESCRIPTION - ORIENTATION: ORIENTATION: LEFT

## 2023-02-22 ASSESSMENT — PAIN DESCRIPTION - LOCATION: LOCATION: FLANK

## 2023-02-22 ASSESSMENT — PAIN SCALES - GENERAL
PAINLEVEL_OUTOF10: 0
PAINLEVEL_OUTOF10: 4

## 2023-02-22 NOTE — CARE COORDINATION
This LSW met with patient and wife at bedside this am. Patient, wife and I discussed discharge plans. Patient will return home with supportive family. Patient and wife also requesting [facility] upon discharge. Norberto Downing in [facility] notified of referral.  I have arranged home 02 through [employer] for patient. Patient to go home with [employer]. LSW / JUDI to follow.   Electronically signed by ANALY Granados LSW on 23 at 12:06 PM EST

## 2023-02-22 NOTE — PROGRESS NOTES
Pharmacy Vancomycin Consult     Vancomycin Day: 5  Current Dosing: Vanco 750mg IV q12h    Recent Labs     02/21/23  0706 02/22/23  0619   BUN 35* 31*   CREATININE 1.51* 1.40*   WBC 12.1* 8.5       Intake/Output Summary (Last 24 hours) at 2/22/2023 0847  Last data filed at 2/22/2023 0439  Gross per 24 hour   Intake 1355.03 ml   Output 550 ml   Net 805.03 ml     Cultures  No results for input(s): BC, BLOODCULT2, MRSAC, RESPCULTURE, LABGRAM, ORG, CXSURG, WNDABS, LABANAE, LABURIN, SPNEUAGU, HSVSRC, FLUA, FLUB, AFBSMEAR, CXST, FUNGUSSMEAR, LABLEGI, VRECX, CDIFPCR, LGNOX544, GIAAGS, ROTA, FECLEU, COVID19 in the last 720 hours. Height: 5' 7\" (170.2 cm), Weight: 269 lb (122 kg), Body mass index is 42.13 kg/m². Estimated Creatinine Clearance: 48 mL/min (A) (based on SCr of 1.4 mg/dL (H)). .    Trough:  Recent Labs     02/22/23  0619   VANCORANDOM 14.5      Assessment/Plan:  Data input into [employer] platform. Current dosing supra-therapeutic for goal. Will adjust dosing to Vanco 1250mg IV q24h. Predicted therapeutic  trough 16.8. Plan repeat level in 48 hours to further assess dosing. Timing of future trough levels may be adjusted based on culture results, renal function, and clinical response. FINAL fluid cultures pending from 2/19 if no evidence of MRSA recommend dc of vanco or change in therapy.      Thank you,  Nancy Garcia, [facility]

## 2023-02-22 NOTE — PROGRESS NOTES
INPATIENT PROGRESS NOTES    PATIENT NAME: Abdi Young  MRN: [id number]  SERVICE DATE:  2023   SERVICE TIME:  3:00 PM      PRIMARY SERVICE: Pulmonary Disease    CHIEF COMPLAINTS: Pleural effusion    INTERVAL HPI: Patient seen and examined at bedside, Interval Notes, orders reviewed. Nursing notes noted      Patient reports no chest pain, denies shortness of breath, no coughing, he is on 2 L O2 saturation 96%, no fever, no nausea no vomiting. 400 cc drained today of bloody looking pleural fluid with mild clots. New information updated in the note today, rest of the examination did not change compared to yesterday. Review of system:     GI Abdominal pain No  Skin Rash No    Social History     Tobacco Use    Smoking status: Former     Packs/day: 0.50     Years: 10.00     Pack years: 5.00     Types: Cigarettes     Quit date: 1968     Years since quittin.8    Smokeless tobacco: Never   Substance Use Topics    Alcohol use: Yes     Comment: social         Problem Relation Age of Onset    Heart Attack Mother     Other Mother          in MVA    Other Father          at age 80    Other Sister          as infant    Cancer Brother     Other Brother         unknown medical hx    Other Brother          at age 61 due to accident    Cancer Daughter         colon & lung cancer    Colon Cancer Daughter     No Known Problems Son     Colon Cancer Other     Breast Cancer Other          OBJECTIVE    Body mass index is 42.13 kg/m². PHYSICAL EXAM:  Vitals:  BP (!) 124/54   Pulse 80   Temp 99 °F (37.2 °C) (Oral)   Resp 16   Ht 5' 7\" (1.702 m)   Wt 269 lb (122 kg)   SpO2 96%   BMI 42.13 kg/m²     General: alert, cooperative, no distress  Head: normocephalic, atraumatic  Eyes:No gross abnormalities.   ENT:  MMM no lesions  Neck:  supple and no masses  Chest : Good air movement, minimal rales at the base, no wheezing, nontender, tympanic, Horan on the left  Heart[de-identified] Heart sounds are normal.  Regular rate and rhythm without murmur, gallop or rub. ABD:  symmetric, soft, non-tender, no guarding or rebound  Musculoskeletal : no cyanosis, no clubbing, and no edema  Neuro:  Grossly normal  Skin: No rashes or nodules noted. Lymph node:  no cervical nodes  Urology: No Horan   Psychiatric: appropriate    DATA:   Recent Labs     02/21/23  0706 02/22/23  0619   WBC 12.1* 8.5   HGB 8.2* 7.5*   HCT 25.1* 22.9*   MCV 89.5 89.8    208       Recent Labs     02/21/23  0706 02/22/23  0619    141   K 4.3 3.9    108*   CO2 21 20   BUN 35* 31*   CREATININE 1.51* 1.40*   GLUCOSE 118* 105*   CALCIUM 8.8 8.6   LABGLOM 44.8* 49.0*         MV Settings:     FiO2 : 21 %    No results for input(s): PHART, VIS0CFH, PO2ART, VWQ5OOF, BEART, I8HOBOAR in the last 72 hours. O2 Device: Nasal cannula  O2 Flow Rate (L/min): 2 L/min    ADULT DIET;  Full Liquid     MEDICATIONS during current hospitalization:    Continuous Infusions:      Scheduled Meds:   vancomycin  1,250 mg IntraVENous Q24H    metoprolol tartrate  25 mg Oral BID    rosuvastatin  10 mg Oral Daily    [Held by provider] losartan  100 mg Oral Daily    [Held by provider] furosemide  20 mg Oral Daily    [Held by provider] aspirin  81 mg Oral Daily    pantoprazole (PROTONIX) 40 mg injection  40 mg IntraVENous Q12H    meropenem  1,000 mg IntraVENous Q8H    melatonin  5 mg Oral Nightly    guaiFENesin  600 mg Oral BID    vancomycin (VANCOCIN) intermittent dosing (placeholder)   Other RX Placeholder    naloxegol  25 mg Oral QAM       PRN Meds:acetaminophen, ondansetron **OR** ondansetron, ipratropium-albuterol, oxyCODONE **OR** oxyCODONE    Radiology  XR ABDOMEN (KUB) (SINGLE AP VIEW)    Result Date: 2/18/2023  EXAMINATION: FOUR SUPINE XRAY VIEW(S) OF THE ABDOMEN 2/18/2023 7:02 am COMPARISON: CHEST X-RAY 02/18/2023 HISTORY: ORDERING SYSTEM PROVIDED HISTORY: abd distentnion TECHNOLOGIST PROVIDED HISTORY: Reason for exam:->abd distentnion What reading provider will be dictating this exam?->CRC FINDINGS: Complete opacification of left hemithorax compatible with some combination of pleural effusion and atelectasis. Obscuration of the left heart border. Pericardial effusion not excluded. Dual chamber transvenous pacemaker in place. The lower pelvis was not imaged. Nonobstructive bowel gas pattern. The stomach, small bowel, and colon are nondilated. 9 mm calcification compatible with a stone at the midpole of the left kidney. Degenerative changes of the lumbar spine. 1.  Complete opacification of left hemithorax compatible with some combination of pleural effusion and atelectasis. Secondary obscuration of left heart border. 2.  Nonobstructive bowel gas pattern. No acute abdominal disease identified. 3.  Probable left renal stone. XR CHEST PORTABLE    Result Date: 2/19/2023  EXAMINATION: ONE XRAY VIEW OF THE CHEST 2/19/2023 7:51 am COMPARISON: 02/18/2023 HISTORY: ORDERING SYSTEM PROVIDED HISTORY: pleural effusion TECHNOLOGIST PROVIDED HISTORY: Reason for exam:->pleural effusion What reading provider will be dictating this exam?->CRC FINDINGS: Note is made of a left chest tube. There is no left pneumothorax. There is been partial clearing of the previously noted left pleural effusion. Significant residual airspace disease as well as a residual pleural effusion is noted The right lung is clear. There is a dual lead cardiac pacer on the left. 1. Minimal partial clearing of the left lung. The previously noted left pleural effusion is smaller 2. Stable position of the left chest tube 3. The right lung is clear. XR CHEST PORTABLE    Result Date: 2/18/2023  EXAMINATION: ONE XRAY VIEW OF THE CHEST 2/18/2023 11:11 am COMPARISON: None.  HISTORY: ORDERING SYSTEM PROVIDED HISTORY: chest tube placement TECHNOLOGIST PROVIDED HISTORY: Reason for exam:->chest tube placement What reading provider will be dictating this exam?->CRC FINDINGS: There is opacification of the left hemithorax.  No evidence of pneumothorax. Air bronchograms are noted in the perihilar region.  Since the prior study there appears to been placement of a left-sided chest tube with its tip near the left upper chest apex.  Right-sided dual lead pacemaker is unchanged. Heart appears enlarged.  Mild reticular opacities in the right lung are unchanged.  Stable degenerative changes in the left shoulder and AC joint.     1.  Apparent interval placement of left chest tube catheter.  No pneumothorax. 2.  Persistent opacification of the left hemithorax.     XR CHEST PORTABLE    Result Date: 2/18/2023  EXAMINATION: ONE XRAY VIEW OF THE CHEST 2/18/2023 7:02 am COMPARISON: There are no prior recent studies available for review HISTORY: ORDERING SYSTEM PROVIDED HISTORY: loculated pleural effusion TECHNOLOGIST PROVIDED HISTORY: Reason for exam:->loculated pleural effusion What reading provider will be dictating this exam?->CRC FINDINGS: The cardiac silhouette appears enlarged.  Please note the left heart border is obscured due to the left lung pathology. There is near complete whiteout of the left hemithorax.  The finding could represent a combination of partial collapse of the left lung and large pleural effusion. There is no right lung infiltrate or right pleural effusion.     1. Near complete whiteout of the left hemithorax likely represent a combination of partial collapse of the left lung and large pleural effusion 2. Cardiomegaly 3. The right lung is grossly clear.     FLUORO FOR SURGICAL PROCEDURES    Result Date: 2/16/2023  EXAMINATION: SPOT FLUOROSCOPIC IMAGES 2/16/2023 6:54 am TECHNIQUE: Fluoroscopy was provided by the radiology department for procedure. Radiologist was not present during examination. FLUOROSCOPY DOSE AND TYPE: Radiation Exposure Index: Fluoroscopy time equals 56.5 seconds.  Total dose equals 43.68 mGy, COMPARISON: None HISTORY: ORDERING SYSTEM PROVIDED HISTORY: pain  TECHNOLOGIST PROVIDED HISTORY: Reason for exam:->pain What reading provider will be dictating this exam?->CRC Intraprocedural imaging. FINDINGS: 12 spot images of the lumbar spine were obtained. Intraprocedural fluoroscopic spot images as above. See separate procedure report for more information. IMPRESSION AND SUGGESTION:  Patient is at risk due to   Exudative pleural effusion, and history of mesothelioma  Coag negative Staphylococcus in pleural fluid, contamination versus infection  Status post Pleurx catheter  SCOTTY  Heart failure  A-fib, on Coumadin at baseline currently Coumadin on hold  History of polycythemia  History of mesothelioma     Recommendation  Pleurx drainage, plan per thoracic surgery  Discussed with Dr. Nancy Barillas, okay to resume anticoagulation in light of underlying polycythemia, strokes, and history of A-fib.,  Also discussed finding on pleural fluid culture, recommended CT chest to evaluate if any residual fluid. will consult ID  Incentive spirometry encouraged, reviewed with the patient and his family, recommend usage 10-15 times per hour  Flutter device also reviewed with patient and family  Physical therapy  O2 to keep sat 90 to 92%  Continue home CPAP  Continue current antibiotic pending ID evaluation  Discussed with patient family resuming Coumadin, however family were concerned what does I am going to start patient on I tried to explain this will be started and monitor INR. Family kept asking me what would be the right dose which I cannot guess at this point. I will defer starting Coumadin to primary team, patient will need bridging. I discussed with Dr. Paul Diggs     I spent 40 min with this patient, greater the 50% of this time was spent in counseling and/or coordinating of care.     Electronically signed by Steve Fermin MD,  [facility]   on 2/22/2023 at 3:00 PM

## 2023-02-22 NOTE — PROGRESS NOTES
Pt shift assessment completed. Pt is alert & oriented x4. Pt denies pain. At start of shift pt had a spike in his temp of 101F. Dr. Elia Thornton was notified and his fever was resolved withy tylenol. Temperature still remains normal.   Medications given per MAR. Pt was placed on CPAP at night. He is slightly struggling with it as he states it pushes out to much air. I did call respiratory up to try and adjust it better for the patient. Pt has no other needs at this time. Call light is within reach. Will continue to monitor.     Electronically signed by Vincent Bradley RN on 2/22/2023 at 12:49 AM

## 2023-02-22 NOTE — PROGRESS NOTES
INPATIENT PROGRESS NOTES    PATIENT NAME: Abdi Young  MRN: [id number]  SERVICE DATE:  February 21, 2023   SERVICE TIME:  7:10 PM      PRIMARY SERVICE: Pulmonary Disease    CHIEF COMPLAIN: Shortness of breath      INTERVAL HPI: Patient seen and examined at bedside, Interval Notes, orders reviewed. Nursing notes noted  Patient is comfortable in bed. Family at bedside. He does get short of breath with any exertion. No chest pain. No fever or chills. No nausea vomiting diarrhea abdominal pain. Pleurx is capped. He will have draining of Pleurx catheter every other day    OBJECTIVE    Body mass index is 42.13 kg/m². PHYSICAL EXAM:  Vitals:  /61   Pulse 82   Temp 100.2 °F (37.9 °C) (Temporal)   Resp 20   Ht 5' 7\" (1.702 m)   Wt 269 lb (122 kg)   SpO2 99%   BMI 42.13 kg/m²   General: Obese alert, awake . comfortable in bed, No distress. Head: Atraumatic , Normocephalic   Eyes: PERRL. No sclera icterus. No conjunctival injection. No discharge   ENT: No nasal  discharge. Pharynx clear. Neck:  Trachea midline. No thyromegaly, no JVD, No cervical adenopathy. Chest : Bilaterally symmetrical ,Normal effort,  No accessory muscle use  Lung : Diminished breath sound at left base no Rales. No wheezing. No rhonchi. Heart[de-identified] Normal  rate. Regular rhythm. No mumur ,  Rub or gallop  ABD: Non-tender. Non-distended. No masses. No organmegaly. Normal bowel sounds. No hernia.   Ext : Trace pitting both leg , No Cyanosis No clubbing  Neuro: no focal weakness          DATA:   Recent Labs     02/20/23  0527 02/21/23  0706   WBC 13.4* 12.1*   HGB 8.2* 8.2*   HCT 24.9* 25.1*   MCV 91.2 89.5    238     Recent Labs     02/19/23  0527 02/20/23  0527 02/21/23  0706    136 137   K 4.5 4.4 4.3    103 104   CO2 19* 21 21   BUN 36* 37* 35*   CREATININE 1.58* 1.44* 1.51*   GLUCOSE 121* 110* 118*   CALCIUM 8.7 8.6 8.8   PROT 5.7*  --   --    LABALBU 3.1*  --   --    BILITOT 0.3  --   --    ALKPHOS 73  --   -- AST 27  --   --    ALT 12  --   --    LABGLOM 42.4* 47.4* 44.8*       MV Settings:     FiO2 : 21 %    No results for input(s): PHART, RFA3UIJ, PO2ART, FGQ7QQH, BEART, S9KBSRFA in the last 72 hours. O2 Device: Nasal cannula  O2 Flow Rate (L/min): 2 L/min    ADULT DIET; Full Liquid     MEDICATIONS during current hospitalization:    Continuous Infusions:      Scheduled Meds:   vancomycin  750 mg IntraVENous Q12H    metoprolol tartrate  25 mg Oral BID    rosuvastatin  10 mg Oral Daily    [Held by provider] losartan  100 mg Oral Daily    [Held by provider] furosemide  20 mg Oral Daily    [Held by provider] aspirin  81 mg Oral Daily    pantoprazole (PROTONIX) 40 mg injection  40 mg IntraVENous Q12H    meropenem  1,000 mg IntraVENous Q8H    melatonin  5 mg Oral Nightly    guaiFENesin  600 mg Oral BID    vancomycin (VANCOCIN) intermittent dosing (placeholder)   Other RX Placeholder    naloxegol  25 mg Oral QAM       PRN Meds:ondansetron **OR** ondansetron, ipratropium-albuterol, oxyCODONE **OR** oxyCODONE    Radiology  XR ABDOMEN (KUB) (SINGLE AP VIEW)    Result Date: 2/18/2023  EXAMINATION: FOUR SUPINE XRAY VIEW(S) OF THE ABDOMEN 2/18/2023 7:02 am COMPARISON: CHEST X-RAY 02/18/2023 HISTORY: ORDERING SYSTEM PROVIDED HISTORY: abd distentnion TECHNOLOGIST PROVIDED HISTORY: Reason for exam:->abd distentnion What reading provider will be dictating this exam?->CRC FINDINGS: Complete opacification of left hemithorax compatible with some combination of pleural effusion and atelectasis. Obscuration of the left heart border. Pericardial effusion not excluded. Dual chamber transvenous pacemaker in place. The lower pelvis was not imaged. Nonobstructive bowel gas pattern. The stomach, small bowel, and colon are nondilated. 9 mm calcification compatible with a stone at the midpole of the left kidney. Degenerative changes of the lumbar spine.      1.  Complete opacification of left hemithorax compatible with some combination of pleural effusion and atelectasis. Secondary obscuration of left heart border. 2.  Nonobstructive bowel gas pattern. No acute abdominal disease identified. 3.  Probable left renal stone. XR CHEST PORTABLE    Result Date: 2/19/2023  EXAMINATION: ONE XRAY VIEW OF THE CHEST 2/19/2023 7:51 am COMPARISON: 02/18/2023 HISTORY: ORDERING SYSTEM PROVIDED HISTORY: pleural effusion TECHNOLOGIST PROVIDED HISTORY: Reason for exam:->pleural effusion What reading provider will be dictating this exam?->CRC FINDINGS: Note is made of a left chest tube. There is no left pneumothorax. There is been partial clearing of the previously noted left pleural effusion. Significant residual airspace disease as well as a residual pleural effusion is noted The right lung is clear. There is a dual lead cardiac pacer on the left. 1. Minimal partial clearing of the left lung. The previously noted left pleural effusion is smaller 2. Stable position of the left chest tube 3. The right lung is clear. XR CHEST PORTABLE    Result Date: 2/18/2023  EXAMINATION: ONE XRAY VIEW OF THE CHEST 2/18/2023 11:11 am COMPARISON: None. HISTORY: ORDERING SYSTEM PROVIDED HISTORY: chest tube placement TECHNOLOGIST PROVIDED HISTORY: Reason for exam:->chest tube placement What reading provider will be dictating this exam?->CRC FINDINGS: There is opacification of the left hemithorax. No evidence of pneumothorax. Air bronchograms are noted in the perihilar region. Since the prior study there appears to been placement of a left-sided chest tube with its tip near the left upper chest apex. Right-sided dual lead pacemaker is unchanged. Heart appears enlarged. Mild reticular opacities in the right lung are unchanged. Stable degenerative changes in the left shoulder and AC joint. 1.  Apparent interval placement of left chest tube catheter. No pneumothorax. 2.  Persistent opacification of the left hemithorax.      XR CHEST PORTABLE    Result Date: 2/18/2023  EXAMINATION: ONE XRAY VIEW OF THE CHEST 2/18/2023 7:02 am COMPARISON: There are no prior recent studies available for review HISTORY: ORDERING SYSTEM PROVIDED HISTORY: loculated pleural effusion TECHNOLOGIST PROVIDED HISTORY: Reason for exam:->loculated pleural effusion What reading provider will be dictating this exam?->CRC FINDINGS: The cardiac silhouette appears enlarged. Please note the left heart border is obscured due to the left lung pathology. There is near complete whiteout of the left hemithorax. The finding could represent a combination of partial collapse of the left lung and large pleural effusion. There is no right lung infiltrate or right pleural effusion. 1. Near complete whiteout of the left hemithorax likely represent a combination of partial collapse of the left lung and large pleural effusion 2. Cardiomegaly 3. The right lung is grossly clear. FLUORO FOR SURGICAL PROCEDURES    Result Date: 2/16/2023  EXAMINATION: SPOT FLUOROSCOPIC IMAGES 2/16/2023 6:54 am TECHNIQUE: Fluoroscopy was provided by the radiology department for procedure. Radiologist was not present during examination. FLUOROSCOPY DOSE AND TYPE: Radiation Exposure Index: Fluoroscopy time equals 56.5 seconds. Total dose equals 43.68 mGy, COMPARISON: None HISTORY: ORDERING SYSTEM PROVIDED HISTORY: pain TECHNOLOGIST PROVIDED HISTORY: Reason for exam:->pain What reading provider will be dictating this exam?->CRC Intraprocedural imaging. FINDINGS: 12 spot images of the lumbar spine were obtained. Intraprocedural fluoroscopic spot images as above. See separate procedure report for more information.        IMPRESSION AND SUGGESTION:    Large left-sided pleural effusion, etiology is not clear could be due to underlying prior history of mesothelioma, will need further work-up  Worsening shortness of breath secondary to #1  SCOTTY  Heart failure  A-fib, on Coumadin at baseline currently Coumadin on hold    Pleurx catheter was capped today. Oncology is following regarding mesothelioma. .  Continue O2 to keep saturation 90% or above and continue present treatment plan. NOTE: This report was transcribed using voice recognition software. Every effort was made to ensure accuracy; however, inadvertent computerized transcription errors may be present.       Electronically signed by Sheri Fan MD, FCCP on 2/21/2023 at 7:10 PM

## 2023-02-22 NOTE — PROGRESS NOTES
Patient resting in bed. Wife at bedside. No complaints of pain. Mild discomfort. Wife had some concerns over  medications. Yuliet served Dr. Ryan Harris who stated he would be up to talk to the patient and wife. No further needs. Call light in reach.

## 2023-02-22 NOTE — PROGRESS NOTES
Gastroenterology Progress Note    Shahana Hameed is a 82 y.o. male patient. Hospitalization Day:4    Chief C/O: anemia, melena    SUBJECTIVE: Seen and examined, no overt bleeding, hemoglobin 7.5, noted febrile overnight, hemodynamically stable, tolerating diet, no abdominal pain. ROS:  Gastrointestinal ROS: no abdominal pain, change in bowel habits, or black or bloody stools    Physical    VITALS:  /64   Pulse 76   Temp 98.6 °F (37 °C) (Axillary)   Resp 14   Ht 5' 7\" (1.702 m)   Wt 269 lb (122 kg)   SpO2 99%   BMI 42.13 kg/m²   TEMPERATURE:  Current - Temp: 98.6 °F (37 °C); Max - Temp  Av.2 °F (37.3 °C)  Min: 98.2 °F (36.8 °C)  Max: 100.9 °F (38.3 °C)    General:  Alert and oriented,  No apparent distress  Skin- without jaundice  Eyes: anicteric sclera  Cardiac: RRR, Nl s1s2, without murmurs  Lungs CTA Bilaterally, normal effort  Abdomen soft, ND, NT, no HSM, Bowel sounds normal  Ext: without edema  Neuro: no asterixis     Data    Data Review:    Recent Labs     23  0527 23  0706 23  0619   WBC 13.4* 12.1* 8.5   HGB 8.2* 8.2* 7.5*   HCT 24.9* 25.1* 22.9*   MCV 91.2 89.5 89.8    238 208     Recent Labs     23  0527 23  0706 23  0619    137 141   K 4.4 4.3 3.9    104 108*   CO2 21 21 20   BUN 37* 35* 31*   CREATININE 1.44* 1.51* 1.40*     No results for input(s): AST, ALT, ALB, BILIDIR, BILITOT, ALKPHOS in the last 72 hours. No results for input(s): LIPASE, AMYLASE in the last 72 hours. No results for input(s): PROTIME, INR in the last 72 hours. Endoscopic hx:  EGD Dr Benton Risk 23  A 2 cm hernia was found. Esophagogastric landmarks were identified: the gastroesophageal junction was found at 36 cm, the lower esophageal sphincter was  found at 38 cm and the upper extent of the gastric folds was found at 36 cm from the incisors.   Patchy moderate inflammation characterized by erythema was found in the gastric antrum and in the gastric body.  The examined duodenum was normal.  The cardia and gastric fundus were normal on retroflexion. No old or fresh blood noted    ASSESSMENT:  83 y/o male admitted patient with shortness of breath, left pleural effusion in the context of recent diagnosis of mesothelioma status post radiation treatment 11/22. Since arrival had Pleurx catheter placed and shortness of breath is  improved. GI was asked to evaluate melena prior to arrival, He reportedly had several weeks of intermittent melanotic stool, none for the past 4 days. On arrival patient was found to have normocytic anemia, with hemoglobin of 8.2, baseline is 12.8, in the context of Coumadin, ASA, and meloxicam.  He denies heartburn, abdominal pain, dysphagia, unintentional weight loss, or changes to his bowel habits. No history of GI bleeding, no history of liver disease, no history of EGD. Most recent colonoscopy 2012, no records are available for review. He has been tolerating a regular diet, and is asymptomatic with no current overt bleeding  Ddx anemia and melena: Esophagitis, gastritis, peptic ulcer disease, AVM, malignancy however least likely  2/22/23 s/p EGD with no fresh of old blood identified, hemoglobin 7.5, no overt bleeding, no further melena, tolerating diet. PLAN :  -Continue course current medical management per primary team  -Continue IV PPI  daily  -Continue serial H&H, trend and transfuse accordingly  -Endoscopic timing to be determined by clinical course will likely need colonoscopy  -ok to advance diet as tolerated    Thank you for allowing me to participate in the care of your patient. Please feel free to contact me with any concerns.     EDELMIRA Hoffman - CNP

## 2023-02-23 PROBLEM — Z74.09 IMPAIRED MOBILITY AND ACTIVITIES OF DAILY LIVING: Status: ACTIVE | Noted: 2023-02-23

## 2023-02-23 PROBLEM — Z78.9 IMPAIRED MOBILITY AND ACTIVITIES OF DAILY LIVING: Status: ACTIVE | Noted: 2023-02-23

## 2023-02-23 LAB
ANION GAP SERPL CALCULATED.3IONS-SCNC: 11 MEQ/L (ref 9–15)
BASOPHILS ABSOLUTE: 0 K/UL (ref 0–0.2)
BASOPHILS RELATIVE PERCENT: 0.2 %
BODY FLUID CULTURE, STERILE: ABNORMAL
BUN BLDV-MCNC: 27 MG/DL (ref 8–23)
CALCIUM SERPL-MCNC: 8.2 MG/DL (ref 8.5–9.9)
CHLORIDE BLD-SCNC: 106 MEQ/L (ref 95–107)
CO2: 20 MEQ/L (ref 20–31)
CREAT SERPL-MCNC: 1.37 MG/DL (ref 0.7–1.2)
EOSINOPHILS ABSOLUTE: 0.2 K/UL (ref 0–0.7)
EOSINOPHILS RELATIVE PERCENT: 3.2 %
GFR SERPL CREATININE-BSD FRML MDRD: 50.3 ML/MIN/{1.73_M2}
GLUCOSE BLD-MCNC: 109 MG/DL (ref 70–99)
HCT VFR BLD CALC: 22.7 % (ref 42–52)
HEMOGLOBIN: 7.3 G/DL (ref 14–18)
INR BLD: 1.4
LYMPHOCYTES ABSOLUTE: 0.5 K/UL (ref 1–4.8)
LYMPHOCYTES RELATIVE PERCENT: 6.6 %
MAGNESIUM: 2.2 MG/DL (ref 1.7–2.4)
MCH RBC QN AUTO: 28.9 PG (ref 27–31.3)
MCHC RBC AUTO-ENTMCNC: 32.2 % (ref 33–37)
MCV RBC AUTO: 89.6 FL (ref 79–92.2)
MONOCYTES ABSOLUTE: 0.8 K/UL (ref 0.2–0.8)
MONOCYTES RELATIVE PERCENT: 11 %
NEUTROPHILS ABSOLUTE: 5.8 K/UL (ref 1.4–6.5)
NEUTROPHILS RELATIVE PERCENT: 79 %
ORGANISM: ABNORMAL
PDW BLD-RTO: 15.7 % (ref 11.5–14.5)
PLATELET # BLD: 217 K/UL (ref 130–400)
POTASSIUM REFLEX MAGNESIUM: 3.4 MEQ/L (ref 3.4–4.9)
PROTHROMBIN TIME: 17.7 SEC (ref 12.3–14.9)
RBC # BLD: 2.54 M/UL (ref 4.7–6.1)
SODIUM BLD-SCNC: 137 MEQ/L (ref 135–144)
WBC # BLD: 7.3 K/UL (ref 4.8–10.8)

## 2023-02-23 PROCEDURE — 6370000000 HC RX 637 (ALT 250 FOR IP): Performed by: INTERNAL MEDICINE

## 2023-02-23 PROCEDURE — 97162 PT EVAL MOD COMPLEX 30 MIN: CPT

## 2023-02-23 PROCEDURE — 99231 SBSQ HOSP IP/OBS SF/LOW 25: CPT | Performed by: NURSE PRACTITIONER

## 2023-02-23 PROCEDURE — 1210000000 HC MED SURG R&B

## 2023-02-23 PROCEDURE — 99233 SBSQ HOSP IP/OBS HIGH 50: CPT | Performed by: INTERNAL MEDICINE

## 2023-02-23 PROCEDURE — 2580000003 HC RX 258: Performed by: INTERNAL MEDICINE

## 2023-02-23 PROCEDURE — 6360000002 HC RX W HCPCS: Performed by: FAMILY MEDICINE

## 2023-02-23 PROCEDURE — 99231 SBSQ HOSP IP/OBS SF/LOW 25: CPT | Performed by: THORACIC SURGERY (CARDIOTHORACIC VASCULAR SURGERY)

## 2023-02-23 PROCEDURE — 97166 OT EVAL MOD COMPLEX 45 MIN: CPT

## 2023-02-23 PROCEDURE — 6360000002 HC RX W HCPCS: Performed by: INTERNAL MEDICINE

## 2023-02-23 PROCEDURE — 85610 PROTHROMBIN TIME: CPT

## 2023-02-23 PROCEDURE — 6370000000 HC RX 637 (ALT 250 FOR IP): Performed by: FAMILY MEDICINE

## 2023-02-23 PROCEDURE — A4216 STERILE WATER/SALINE, 10 ML: HCPCS | Performed by: INTERNAL MEDICINE

## 2023-02-23 PROCEDURE — C9113 INJ PANTOPRAZOLE SODIUM, VIA: HCPCS | Performed by: INTERNAL MEDICINE

## 2023-02-23 PROCEDURE — 80048 BASIC METABOLIC PNL TOTAL CA: CPT

## 2023-02-23 PROCEDURE — 2700000000 HC OXYGEN THERAPY PER DAY

## 2023-02-23 PROCEDURE — 83735 ASSAY OF MAGNESIUM: CPT

## 2023-02-23 PROCEDURE — 99232 SBSQ HOSP IP/OBS MODERATE 35: CPT | Performed by: INTERNAL MEDICINE

## 2023-02-23 PROCEDURE — 36415 COLL VENOUS BLD VENIPUNCTURE: CPT

## 2023-02-23 PROCEDURE — 85025 COMPLETE CBC W/AUTO DIFF WBC: CPT

## 2023-02-23 RX ADMIN — WARFARIN SODIUM 4 MG: 2 TABLET ORAL at 17:49

## 2023-02-23 RX ADMIN — MEROPENEM 1000 MG: 1 INJECTION, POWDER, FOR SOLUTION INTRAVENOUS at 10:10

## 2023-02-23 RX ADMIN — ROSUVASTATIN CALCIUM 10 MG: 10 TABLET, FILM COATED ORAL at 09:37

## 2023-02-23 RX ADMIN — Medication 5 MG: at 20:13

## 2023-02-23 RX ADMIN — METOPROLOL TARTRATE 25 MG: 25 TABLET, FILM COATED ORAL at 20:13

## 2023-02-23 RX ADMIN — SODIUM CHLORIDE, PRESERVATIVE FREE 40 MG: 5 INJECTION INTRAVENOUS at 03:31

## 2023-02-23 RX ADMIN — SODIUM CHLORIDE, PRESERVATIVE FREE 40 MG: 5 INJECTION INTRAVENOUS at 16:31

## 2023-02-23 RX ADMIN — MEROPENEM 1000 MG: 1 INJECTION, POWDER, FOR SOLUTION INTRAVENOUS at 23:48

## 2023-02-23 RX ADMIN — GUAIFENESIN 600 MG: 600 TABLET ORAL at 20:13

## 2023-02-23 RX ADMIN — MEROPENEM 1000 MG: 1 INJECTION, POWDER, FOR SOLUTION INTRAVENOUS at 01:06

## 2023-02-23 RX ADMIN — MEROPENEM 1000 MG: 1 INJECTION, POWDER, FOR SOLUTION INTRAVENOUS at 17:52

## 2023-02-23 RX ADMIN — FUROSEMIDE 20 MG: 20 TABLET ORAL at 10:26

## 2023-02-23 RX ADMIN — ENOXAPARIN SODIUM 120 MG: 150 INJECTION SUBCUTANEOUS at 20:13

## 2023-02-23 RX ADMIN — METOPROLOL TARTRATE 25 MG: 25 TABLET, FILM COATED ORAL at 09:35

## 2023-02-23 RX ADMIN — ASPIRIN 81 MG: 81 TABLET, COATED ORAL at 10:30

## 2023-02-23 RX ADMIN — ENOXAPARIN SODIUM 120 MG: 150 INJECTION SUBCUTANEOUS at 09:40

## 2023-02-23 RX ADMIN — GUAIFENESIN 600 MG: 600 TABLET ORAL at 09:36

## 2023-02-23 RX ADMIN — VANCOMYCIN HYDROCHLORIDE 1250 MG: 5 INJECTION, POWDER, LYOPHILIZED, FOR SOLUTION INTRAVENOUS at 21:48

## 2023-02-23 ASSESSMENT — ENCOUNTER SYMPTOMS
NAUSEA: 0
WHEEZING: 0
CHEST TIGHTNESS: 0
STRIDOR: 0
VOICE CHANGE: 0
ABDOMINAL DISTENTION: 0
VOMITING: 0
BACK PAIN: 1
TROUBLE SWALLOWING: 0
ABDOMINAL PAIN: 0
CHOKING: 0
DIARRHEA: 0
COUGH: 1
SHORTNESS OF BREATH: 1
SORE THROAT: 0

## 2023-02-23 ASSESSMENT — PAIN SCALES - GENERAL
PAINLEVEL_OUTOF10: 0
PAINLEVEL_OUTOF10: 0
PAINLEVEL_OUTOF10: 4

## 2023-02-23 ASSESSMENT — PAIN DESCRIPTION - DESCRIPTORS: DESCRIPTORS: DISCOMFORT

## 2023-02-23 ASSESSMENT — PAIN DESCRIPTION - LOCATION: LOCATION: FLANK

## 2023-02-23 ASSESSMENT — PAIN DESCRIPTION - ORIENTATION: ORIENTATION: LEFT

## 2023-02-23 NOTE — PROGRESS NOTES
[facility] Occupational Therapy      Date: 2023  Patient Name: Tyree Jordan        MRN: [id number]  Account: [de-identified]   : 1935  (82 y.o.)  Room: [place] [address]    Chart reviewed, attempted OT at 21  for evaluation. Patient not seen 2° to:    Pt. having bedside procedure. Will attempt again when able.     Electronically signed by NAIN Ley on  at 10:51 AM

## 2023-02-23 NOTE — CARE COORDINATION
IV BENEFIT RECEIVED BACK FROM [facility]. PT HAS 85% COVERAGE$14.94/DAY.

## 2023-02-23 NOTE — PROGRESS NOTES
Hospitalist Progress Note      PCP: Kevin Barahona DO    Date of Admission: 2/18/2023    Chief Complaint:    No chief complaint on file. Subjective:  Patient denies fevers, chills, sweats, CP, SOB, diarrhea or burning micturition. Sitting up in bed. Mentation improved, wife states he is near baseline. 12 point ROS negative other than mentioned above     Medications:  Reviewed    Infusion Medications   Scheduled Medications    vancomycin  1,250 mg IntraVENous Q24H    warfarin  4 mg Oral Once per day on Sun Tue Thu Sat    warfarin  5 mg Oral Once per day on Mon Wed Fri    enoxaparin  1 mg/kg SubCUTAneous BID    metoprolol tartrate  25 mg Oral BID    rosuvastatin  10 mg Oral Daily    [Held by provider] losartan  100 mg Oral Daily    [Held by provider] furosemide  20 mg Oral Daily    [Held by provider] aspirin  81 mg Oral Daily    pantoprazole (PROTONIX) 40 mg injection  40 mg IntraVENous Q12H    meropenem  1,000 mg IntraVENous Q8H    melatonin  5 mg Oral Nightly    guaiFENesin  600 mg Oral BID    vancomycin (VANCOCIN) intermittent dosing (placeholder)   Other RX Placeholder    naloxegol  25 mg Oral QAM     PRN Meds: acetaminophen, ondansetron **OR** ondansetron, ipratropium-albuterol, oxyCODONE **OR** oxyCODONE      Intake/Output Summary (Last 24 hours) at 2/23/2023 9448  Last data filed at 2/22/2023 1223  Gross per 24 hour   Intake --   Output 400 ml   Net -400 ml       Exam:    BP (!) 115/59   Pulse 67   Temp 99.4 °F (37.4 °C) (Oral)   Resp 14   Ht 5' 7\" (1.702 m)   Wt 269 lb (122 kg)   SpO2 96%   BMI 42.13 kg/m²     General appearance: No apparent distress, appears stated age and cooperative. HEENT:  Conjunctivae/corneas clear. Neck: Trachea midline. Respiratory:  Normal respiratory effort. Clear to auscultation  Cardiovascular: Regular rate and rhythm  Abdomen: Soft, non-tender, non-distended with normal bowel sounds.   Musculoskeletal: No clubbing, cyanosis or edema bilaterally  Neuro: Non Focal. Capillary Refill: Brisk,< 3 seconds   Peripheral Pulses: +2 palpable, equal bilaterally     Labs:   Recent Labs     02/21/23  0706 02/22/23  0619 02/23/23  0459   WBC 12.1* 8.5 7.3   HGB 8.2* 7.5* 7.3*   HCT 25.1* 22.9* 22.7*    208 217     Recent Labs     02/21/23  0706 02/22/23  0619 02/23/23  0459    141 137   K 4.3 3.9 3.4    108* 106   CO2 21 20 20   BUN 35* 31* 27*   CREATININE 1.51* 1.40* 1.37*   CALCIUM 8.8 8.6 8.2*     No results for input(s): AST, ALT, BILIDIR, BILITOT, ALKPHOS in the last 72 hours. Recent Labs     02/22/23  1528 02/23/23  0459   INR 1.3 1.4       No results for input(s): Eliana Earnest in the last 72 hours. Urinalysis:      Lab Results   Component Value Date/Time    NITRU Negative 02/19/2023 03:13 PM    WBCUA 3-5 02/19/2023 03:13 PM    BACTERIA Negative 02/19/2023 03:13 PM    RBCUA 6-10 02/19/2023 03:13 PM    BLOODU TRACE 02/19/2023 03:13 PM    SPECGRAV 1.028 02/19/2023 03:13 PM    GLUCOSEU Negative 02/19/2023 03:13 PM       Radiology:  CT CHEST WO CONTRAST   Final Result   Indwelling chest tube identified on the left with small left pleural effusion   and trace anterior pneumothorax. Consolidation seen in airspace disease   throughout the left upper and lower lobes to suggest a multifocal   superimposed pneumonia. Soft tissue density seen in the hilar region to   suggest possible reactive adenopathy. Moderate-sized pericardial effusion. Minimal atelectatic changes seen at the right lung base. Incidental stones   in the gallbladder with small hiatal hernia. US DUP UPPER EXTREMITY RIGHT VENOUS   Final Result   No evidence of DVT. XR CHEST PORTABLE   Final Result   1. Minimal partial clearing of the left lung. The previously noted left   pleural effusion is smaller   2. Stable position of the left chest tube   3. The right lung is clear. XR CHEST PORTABLE   Final Result   1.   Apparent interval placement of left chest tube catheter. No pneumothorax. 2.  Persistent opacification of the left hemithorax. XR CHEST PORTABLE   Final Result   1. Near complete whiteout of the left hemithorax likely represent a   combination of partial collapse of the left lung and large pleural effusion   2. Cardiomegaly   3. The right lung is grossly clear. XR ABDOMEN (KUB) (SINGLE AP VIEW)   Final Result   1. Complete opacification of left hemithorax compatible with some   combination of pleural effusion and atelectasis. Secondary obscuration of   left heart border. 2.  Nonobstructive bowel gas pattern. No acute abdominal disease identified. 3.  Probable left renal stone. Assessment/Plan:    Loculated pleural effusion: Chest tube in place; possibly related to mesothelioma; pain regimen is doing well now  2/22 -initially patient prepared for discharge today however fluid culture noted positive today. Patient was febrile x1. Discussed with pulmonology that patient will likely require IV antibiotics for extended period. Infectious disease consultation. CT chest ordered. Will likely need PICC line and possible placement. Initial DC plan held  Mesothelioma: Seen by oncology   Constipation: Resolved; continue movantik  History of melana: At home prior to admission; stools appear non melanotic now but family requesting GI evaluation which is reasonable; GI consulted; clear liquid diet; IV PPI   2/20 - gi to see, Og Ochoa egd/colon  2/21 - egd this am with gastritis, small hernia. Monitor on clears overnight, probable dc home tomorrow. atrial fibrillation: Hold warfarin for now. Continue metoprolol for rate control. Chronic pain with spinal stimulator: Consult to pain management   Sick sinus syndrome status post pacemaker: Continue beta-blocker  SCOTTY escalate to BiPAP before surgery. 5 L bleed in O2  Polycythemia: Follow CBC.   Stat CBC now    Dispo - 2/20 - await gi recs, home o2 eval, dc planning    Active Hospital Problems    Diagnosis Date Noted    Gastritis without bleeding [K29.70] 02/21/2023     Priority: Medium    Melena [K92.1] 02/20/2023     Priority: Medium    Pleural effusion [J90] 02/18/2023     Priority: Medium        Additional work up or/and treatment plan may be added today or then after based on clinical progression. I am managing a portion of pt care. Some medical issues are handled by other specialists. Additional work up and treatment should be done in out pt setting by pt PCP and other out pt providers. In addition to examining and evaluating pt, I spent additional time explaining care, normal and abnormal findings, and treatment plan. All of pt questions were answered. Counseling, diet and education were  provided. Case will be discussed with nursing staff when appropriate. Family will be updated if and when appropriate. Diet: ADULT DIET; Regular;  Low Sodium (2 gm)    Code Status: Full Code    PT/OT Eval     Electronically signed by Valerie Bañuelos MD on 2/23/2023 at 8:38 AM

## 2023-02-23 NOTE — CARE COORDINATION
Met with pt and spouse at bedside to discuss discharge planning. Discussed possible need of IV ATB on discharge. Spouse and pt would like for pt to go to a SNF if IV ATB needed. [place] of choice offered and pt would like 1. [address] 2. [facility]. Family did not have a third choice at this time. [facility]. [address] [facility] they can accept the pt.

## 2023-02-23 NOTE — PROGRESS NOTES
INPATIENT PROGRESS NOTES    PATIENT NAME: Noemi Pal  MRN: [id number]  SERVICE DATE:  2023   SERVICE TIME:  1:42 PM      PRIMARY SERVICE: Pulmonary Disease    CHIEF COMPLAINTS: Pleural effusion    INTERVAL HPI: Patient seen and examined at bedside, Interval Notes, orders reviewed. Nursing notes noted      He feels better, working with physical therapy today, denies pain, no coughing, no fever no chills, he is on 2 L O2 saturation 96%, no fever    New information updated in the note today, rest of the examination did not change compared to yesterday. Review of system:     GI Abdominal pain No  Skin Rash No    Social History     Tobacco Use    Smoking status: Former     Packs/day: 0.50     Years: 10.00     Pack years: 5.00     Types: Cigarettes     Quit date: 1968     Years since quittin.8    Smokeless tobacco: Never   Substance Use Topics    Alcohol use: Yes     Comment: social         Problem Relation Age of Onset    Heart Attack Mother     Other Mother          in MVA    Other Father          at age 80    Other Sister          as infant    Cancer Brother     Other Brother         unknown medical hx    Other Brother          at age 61 due to accident    Cancer Daughter         colon & lung cancer    Colon Cancer Daughter     No Known Problems Son     Colon Cancer Other     Breast Cancer Other          OBJECTIVE    Body mass index is 42.13 kg/m². PHYSICAL EXAM:  Vitals:  /65   Pulse 71 Comment: Apical  Temp 99.4 °F (37.4 °C) (Oral)   Resp 16   Ht 5' 7\" (1.702 m)   Wt 269 lb (122 kg)   SpO2 96%   BMI 42.13 kg/m²     General: alert, cooperative, no distress  Head: normocephalic, atraumatic  Eyes:No gross abnormalities.   ENT:  MMM no lesions  Neck:  supple and no masses  Chest : Good air movement, minimal rales at the base, no wheezing, nontender, tympanic, Horan on the left  Heart[de-identified] Heart sounds are normal.  Regular rate and rhythm without murmur, gallop or rub.  ABD:  symmetric, soft, non-tender, no guarding or rebound  Musculoskeletal : no cyanosis, no clubbing, and no edema  Neuro:  Grossly normal  Skin: No rashes or nodules noted. Lymph node:  no cervical nodes  Urology: No Horan   Psychiatric: appropriate    DATA:   Recent Labs     02/22/23 0619 02/23/23 0459   WBC 8.5 7.3   HGB 7.5* 7.3*   HCT 22.9* 22.7*   MCV 89.8 89.6    217       Recent Labs     02/22/23 0619 02/23/23 0459    137   K 3.9 3.4   * 106   CO2 20 20   BUN 31* 27*   CREATININE 1.40* 1.37*   GLUCOSE 105* 109*   CALCIUM 8.6 8.2*   LABGLOM 49.0* 50.3*         MV Settings:     FiO2 : 21 %    No results for input(s): PHART, VBE4WPR, PO2ART, JZI0LPO, BEART, Z3OUMWZZ in the last 72 hours. O2 Device: Nasal cannula  O2 Flow Rate (L/min): 2 L/min    ADULT DIET; Regular;  Low Sodium (2 gm)     MEDICATIONS during current hospitalization:    Continuous Infusions:      Scheduled Meds:   vancomycin  1,250 mg IntraVENous Q24H    warfarin  4 mg Oral Once per day on Sun Tue Thu Sat    warfarin  5 mg Oral Once per day on Mon Wed Fri    enoxaparin  1 mg/kg SubCUTAneous BID    metoprolol tartrate  25 mg Oral BID    rosuvastatin  10 mg Oral Daily    [Held by provider] losartan  100 mg Oral Daily    furosemide  20 mg Oral Daily    aspirin  81 mg Oral Daily    pantoprazole (PROTONIX) 40 mg injection  40 mg IntraVENous Q12H    meropenem  1,000 mg IntraVENous Q8H    melatonin  5 mg Oral Nightly    guaiFENesin  600 mg Oral BID    vancomycin (VANCOCIN) intermittent dosing (placeholder)   Other RX Placeholder    naloxegol  25 mg Oral QAM       PRN Meds:acetaminophen, ondansetron **OR** ondansetron, ipratropium-albuterol, oxyCODONE **OR** oxyCODONE    Radiology  XR ABDOMEN (KUB) (SINGLE AP VIEW)    Result Date: 2/18/2023  EXAMINATION: FOUR SUPINE XRAY VIEW(S) OF THE ABDOMEN 2/18/2023 7:02 am COMPARISON: CHEST X-RAY 02/18/2023 HISTORY: ORDERING SYSTEM PROVIDED HISTORY: abd distentnion TECHNOLOGIST PROVIDED HISTORY: Reason for exam:->abd distentnion What reading provider will be dictating this exam?->CRC FINDINGS: Complete opacification of left hemithorax compatible with some combination of pleural effusion and atelectasis. Obscuration of the left heart border. Pericardial effusion not excluded. Dual chamber transvenous pacemaker in place. The lower pelvis was not imaged. Nonobstructive bowel gas pattern. The stomach, small bowel, and colon are nondilated. 9 mm calcification compatible with a stone at the midpole of the left kidney. Degenerative changes of the lumbar spine. 1.  Complete opacification of left hemithorax compatible with some combination of pleural effusion and atelectasis. Secondary obscuration of left heart border. 2.  Nonobstructive bowel gas pattern. No acute abdominal disease identified. 3.  Probable left renal stone. XR CHEST PORTABLE    Result Date: 2/19/2023  EXAMINATION: ONE XRAY VIEW OF THE CHEST 2/19/2023 7:51 am COMPARISON: 02/18/2023 HISTORY: ORDERING SYSTEM PROVIDED HISTORY: pleural effusion TECHNOLOGIST PROVIDED HISTORY: Reason for exam:->pleural effusion What reading provider will be dictating this exam?->CRC FINDINGS: Note is made of a left chest tube. There is no left pneumothorax. There is been partial clearing of the previously noted left pleural effusion. Significant residual airspace disease as well as a residual pleural effusion is noted The right lung is clear. There is a dual lead cardiac pacer on the left. 1. Minimal partial clearing of the left lung. The previously noted left pleural effusion is smaller 2. Stable position of the left chest tube 3. The right lung is clear. XR CHEST PORTABLE    Result Date: 2/18/2023  EXAMINATION: ONE XRAY VIEW OF THE CHEST 2/18/2023 11:11 am COMPARISON: None.  HISTORY: ORDERING SYSTEM PROVIDED HISTORY: chest tube placement TECHNOLOGIST PROVIDED HISTORY: Reason for exam:->chest tube placement What reading provider will be dictating this exam?->CRC FINDINGS: There is opacification of the left hemithorax. No evidence of pneumothorax. Air bronchograms are noted in the perihilar region. Since the prior study there appears to been placement of a left-sided chest tube with its tip near the left upper chest apex. Right-sided dual lead pacemaker is unchanged. Heart appears enlarged. Mild reticular opacities in the right lung are unchanged. Stable degenerative changes in the left shoulder and AC joint. 1.  Apparent interval placement of left chest tube catheter. No pneumothorax. 2.  Persistent opacification of the left hemithorax. XR CHEST PORTABLE    Result Date: 2/18/2023  EXAMINATION: ONE XRAY VIEW OF THE CHEST 2/18/2023 7:02 am COMPARISON: There are no prior recent studies available for review HISTORY: ORDERING SYSTEM PROVIDED HISTORY: loculated pleural effusion TECHNOLOGIST PROVIDED HISTORY: Reason for exam:->loculated pleural effusion What reading provider will be dictating this exam?->CRC FINDINGS: The cardiac silhouette appears enlarged. Please note the left heart border is obscured due to the left lung pathology. There is near complete whiteout of the left hemithorax. The finding could represent a combination of partial collapse of the left lung and large pleural effusion. There is no right lung infiltrate or right pleural effusion. 1. Near complete whiteout of the left hemithorax likely represent a combination of partial collapse of the left lung and large pleural effusion 2. Cardiomegaly 3. The right lung is grossly clear. FLUORO FOR SURGICAL PROCEDURES    Result Date: 2/16/2023  EXAMINATION: SPOT FLUOROSCOPIC IMAGES 2/16/2023 6:54 am TECHNIQUE: Fluoroscopy was provided by the radiology department for procedure. Radiologist was not present during examination. FLUOROSCOPY DOSE AND TYPE: Radiation Exposure Index: Fluoroscopy time equals 56.5 seconds.   Total dose equals 43.68 mGy, COMPARISON: None HISTORY: ORDERING SYSTEM PROVIDED HISTORY: pain TECHNOLOGIST PROVIDED HISTORY: Reason for exam:->pain What reading provider will be dictating this exam?->CRC Intraprocedural imaging. FINDINGS: 12 spot images of the lumbar spine were obtained. Intraprocedural fluoroscopic spot images as above. See separate procedure report for more information.        CT chest shows pulmonary infiltrate no significant residual pleural effusion      IMPRESSION AND SUGGESTION:  Patient is at risk due to   Exudative pleural effusion, and history of mesothelioma  Coag negative Staphylococcus in pleural fluid, contamination versus infection  Status post Pleurx catheter  SCOTTY  Heart failure  A-fib, on Coumadin at baseline currently Coumadin on hold  History of polycythemia  History of mesothelioma     Recommendation  Continue draining Pleurx daily  Appreciate thoracic surgery and ID  Continue current antibiotics  Pulmonary hygiene  Consult rehab  Physical therapy  O2 to keep sat 90 to 92%  Continue home CPAP  Resume Coumadin       Electronically signed by Lesley Clemens MD,  FCCP   on 2/23/2023 at 1:42 PM

## 2023-02-23 NOTE — PROGRESS NOTES
Physical Therapy Med Surg Initial Assessment  Facility/Department: [facility]G [facility]  Room: [place] [address]       NAME: Bhavik Dietrich  : 1935 (82 y.o.)  MRN: [id number]  CODE STATUS: Full Code    Date of Service: 2023    Patient Diagnosis(es): Pleural effusion [J90]   No chief complaint on file.     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    Mixed hyperlipidemia 2022    Congestive heart failure ([address] 2022    Lumbar spondylosis 2022    Balance disorder 2022    Lumbar stenosis with neurogenic claudication 2021    Atherosclerosis of native artery of both lower extremities with intermittent claudication ([address] 2021    Atrial fibrillation ([address] 2021    Venous insufficiency 2021    Sleep apnea 2021    Excessive daytime sleepiness 2021    Spinal stenosis of lumbar region with neurogenic claudication 2021    Transient ischemic attack 2019    Polycythemia 2018    Intermittent claudication ([address] 12/10/2018    Rosacea 2018    Fuchs' corneal dystrophy 2016    Ventral hernia, recurrent 2013    Ventral hernia 2012    Hx of blood clots     Former smoker     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        Past Medical History:   Diagnosis Date    A-fib ([address]     approx 1 year ago-cardioverted    Arthritis     Baker's cyst of knee, left     Cancer ([address]     mesothelioma - radiation treatments    Cerebral artery occlusion with cerebral infarction ([address] 2000    TIA sx felt funny --     Congestive heart failure ([address] 03/08/2022    Coronary artery disease     HTN (hypertension)     meds > 20 yrs    Hx of blood clots 2012    DVT left leg     Hyperlipidemia     meds > 20 yrs    Pacemaker 07/19/2022    Polycythemia     Torn meniscus     left knee     Past Surgical History:   Procedure Laterality Date    BACK SURGERY      COLONOSCOPY  2009    COLONOSCOPY  2012    CORONARY ANGIOPLASTY WITH STENT PLACEMENT  10/2006    x 1 cardiac stent    ENDOSCOPY, COLON, DIAGNOSTIC      EYE SURGERY      [place] with IOL OU    HERNIA REPAIR  03/2013    redo ca mesh in [address] -- umbilical hernia    JOINT REPLACEMENT Bilateral 2009 & 2011    Bilateral TKR    KNEE SURGERY Left      arthroscopic surgery to repair meniscus of left knee    LAMINECTOMY N/A 02/08/2021    RIGHT BILATERAL L2-3 3-4 4-5 MICRODECOMPRESSION performed by Terry David MD at [facility] 7/19/22    PAIN MANAGEMENT PROCEDURE N/A 2/16/2023    ATTEMPTED SPINAL CORD STIMULATOR PERMANENT PLACEMENT performed by Satish Burciaga MD at [address] N/A 1/10/2023    SPINAL CORD STIMULATOR TRIAL performed by Satish Burciaga MD at [place]  age 8    [address]  03/2012    UPPER GASTROINTESTINAL ENDOSCOPY N/A 2/21/2023    EGD DIAGNOSTIC ONLY performed by Mary Lou Oviedo MD at [facility]       Chart Reviewed: Yes  Family / Caregiver Present: Yes (spouse)    Restrictions:  Restrictions/Precautions: Fall Risk     SUBJECTIVE:        Pain  Pain: no pain reported    Prior Level of Function:  Social/Functional History  Lives With: Spouse  Type of Home: House  Home Layout: One level (basement)  Home Access: Stairs to enter without rails (hoolds on to door jamb)  Entrance Stairs - Number of Steps: 2  Bathroom Shower/Tub: Walk-in shower  Home Equipment: Walker, rolling, Rollator, Cane (uses rollator outside)  Has the patient had two or more falls in the past year or any fall with injury in the past year?: Yes (broken rib and puncture lung)  ADL Assistance: [address]: Needs assistance (staniding tolerance is limited)  Ambulation Assistance: Independent (ww)  Transfer Assistance: Independent  Active : No  Occupation: Retired  Type of Occupation: construction work  Additional Comments: right handed    OBJECTIVE:   Vision  Vision: Impaired  Vision Exceptions: Wears glasses at all times  H[facility]: Ex[facility]  Hearing Exceptions: Hard of hearing/hearing concerns;Bilateral hearing aid    Cognition:  Overall Orientation Status: Within Functional Limits  Orientation Level: Oriented X4  Follows Commands: Within Functional Limits  Cognition Comment: follows commands consistently         ROM:  AROM: Generally decreased, functional  PROM: Within functional limits    Strength:  Strength: Generally decreased, functional    Neuro:  Balance  Sitting - Static: Good  Sitting - Dynamic: Good  Standing - Static: Poor (BUE support in standing required - pt able to stand 2 min with BUE support with SBA)  Standing - Dynamic: Poor (pt requires BUE support  - he is unsteady in gait)                      Coordination: Generally decreased, functional         Bed mobility  Rolling to Left: Stand by assistance  Rolling to Right: Stand by assistance  Supine to Sit: Moderate assistance (assist for trunk required)  Scooting: Stand by assistance (along edge of bed)  Bed Mobility Comments: HOB flat  - intermittent use of rails required    Transfers  Sit to Stand: Minimal Assistance; Moderate Assistance (heavy pull on rail in toilet transfer noted)  Stand to Sit: Minimal Assistance; Moderate Assistance  Comment: multiple trials with varying assistance    Ambulation  Surface: Level tile  Device: Rolling Walker  Other Apparatus: O2  Assistance: Minimal assistance  Quality of Gait: forward flexed trunk, heavy UE use and support, short step length, knee stability fair, SOB following  - 2 L O2 in place with at  95%  Distance: 25 feet x 2    Stairs/Curb  Stairs?: No              Activity Tolerance  Activity Tolerance: Patient tolerated evaluation without incident    Patient Education  Education Given To: Patient  Education Provided: Role of Therapy;Plan of Care  Education Method: Demonstration;Verbal  Education Outcome: Verbalized understanding;Demonstrated understanding       ASSESSMENT:   Body Structures, Functions, Activity Limitations Requiring Skilled Therapeutic Intervention: Decreased functional mobility ; Decreased safe awareness;Decreased strength;Decreased endurance;Decreased balance;Decreased posture  Decision Making: Medium Complexity  History: high  Exam: med  Clinical Presentation: med    Barriers to Learning: none         DISCHARGE RECOMMENDATIONS:  Discharge Recommendations: Continue to assess pending progress    Assessment: Pt demonstrates deficits as listed. He is requiring assistance with all mobility.  Pt is requiring assistance with mobility and would benefit from follow up PT at this time  Requires PT Follow-Up: Yes       PLAN OF CARE:  Physcial Therapy Plan  General Plan: 1 time a day 3-6 times a week  Current Treatment Recommendations: Strengthening, Balance training, Functional mobility training, Transfer training, Gait training, Cognitive reorientation, Neuromuscular re-education, Safety education & training, Patient/Caregiver education & training, Equipment evaluation, education, & procurement    Safety Devices  Type of Devices: Call light within reach, Left in bed, Bed alarm in place  Restraints  Restraints Initially in Place: No    Goals:  Long Term Goals  Long Term Goal 1: indep bed mobility  Long Term Goal 2: indep sit to stand  Long Term Goal 3: indep gait with ww 50 feet  Long Term Goal 4: pt able to stand with light BUE support 2 min    AMPAC (6 CLICK) BASIC MOBILITY  AM-PAC Inpatient Mobility Raw Score : 12     Therapy Time: Individual   Time In 1320   Time Out 1347   [address], [address] [facility], 02/23/23 at 1:57 PM         Definitions for assistance levels  Independent = pt does not require any physical supervision or assistance from another person for activity completion. Device may be needed.   Stand by assistance = pt requires verbal cues or instructions from another person, close to but not touching, to perform the activity  Minimal assistance= pt performs 75% or more of the activity; assistance is required to complete the activity  Moderate assistance= pt performs 50% of the activity; assistance is required to complete the activity  Maximal assistance = pt performs 25% of the activity; assistance is required to complete the activity  Dependent = pt requires total physical assistance to accomplish the task

## 2023-02-23 NOTE — PROGRESS NOTES
Cultures from effusion positive. CT with near completely expanded lung. Only a small loculation anteriorly. Agree with antibiotics and will leave Pleurx in and continue 3X weekly drainage. Hopefully repeated drainage and antibiotics will sterilize the pleural space and allow outpatient removal of drain. If signs of worsening infection, or if fluid recurs after Pleurx removal, then will plan VATS, but will try to avoid surgery for now. Should have cardiology evaluate small-moderate pericardial effusion and plan follow up. Pericardial window if early tamponade or worsening effusion on f/u.

## 2023-02-23 NOTE — PROGRESS NOTES
Shift assessment completed and medications given per MAR. Wife is at bedside. Patient up and ambulated to restroom with one assist. No complaint of pain or discomfort at this time. Patient is resting in bed at lowest position with alarm on. Patient will be discharged with O2 therapy. Will continue to monitor.

## 2023-02-23 NOTE — CARE COORDINATION
IV BENEFIT REQUEST FORM    FAX FROM: [facility]                        [address] [facility] [place]    REQUESTED BY: Electronically signed by Diego Springer RN on 2023 at 8:22 AM                                               RN/C3: PHONE: [phone])     DATE:/TIME OF REQUEST: 23  TIME: 8:22 AM      TO: [address] TO: [phone]    PHONE: [phone]     THIS PATIENT HAS BEEN IDENTIFIED TO POSSIBLY NEED LONG TERM IV'S. PLEASE CHECK INSURANCE COVERAGE FOR THE FOLLOWING PT/DRUGS. PATIENT'S NAME: Ranulfomon Lipoma                              [facility]   PATIENT'S : 1935  PATIENT ADDRESS: [place] [address]  SSN:    ()     PAYOR NAME:  Payor: Elissa Shree / Plan: MEDICARE PART A AND B / Product Type: *No Product type* /     DRUG: (VANCO)                         DOSE: (1250MG)            FREQUENCY: Q (24) HR    DRUG: (MERREM)                         DOSE:1000MG            FREQUENCY: Q (8) HR    __________ CHECK HERE IF PT HAS NO INSURANCE AND REQUESTING SELF PAY COST. *IF [facility] IS NOT A PROVIDER FOR THIS PATIENT, PLEASE FORWARD INFO VIA FAX TO [facility] @ [phone],(PHONE NUMBER: [phone]) TO RUN BENEFIT VERIFICATION AND NOTIFY THE ABOVE C3 OF THIS PLAN. (FAX FACE SHEET WITH DEMOGRAPHICS AND INSURANCE INFO WITH THIS FORM.)  PLEASE FAX BENEFIT INFO TO: THE [phone]    This message is intended only for the use of the individual or entity to which it is addressed and may contain information that is privileged, confidential, and exempt from disclosure under applicable law.  If the reader of the notice is not intended recipient of the employee/agent responsible for delivering the message to the intended recipient, you are hereby notified than any dissemination, distribution or copying of this communication is strictly prohibited. Please contact the sender for further instructions on handling the information.

## 2023-02-23 NOTE — PROGRESS NOTES
Hematology/Oncology   Progress Note        CHIEF COMPLAINT/HPI:  Follow up of mesothelioma. Pleural fluid has infection and on antibiotics. Hemoglobin at 7.3. Will given additional venofer x 1 today. REVIEW OF SYSTEMS:    Unremarkable except for symptoms mentioned in HPI.     Current Inpatient Medications:    Current Facility-Administered Medications   Medication Dose Route Frequency Provider Last Rate Last Admin    vancomycin (VANCOCIN) 1,250 mg in sodium chloride 0.9 % 250 mL IVPB (ADDAVIAL)  1,250 mg IntraVENous Q24H Sanjuanita Dense Sedar, DO   Stopped at 02/22/23 2325    warfarin (COUMADIN) tablet 4 mg  4 mg Oral Once per day on Sun Tue Thu Sat Rosa Lopez MD        warfarin (COUMADIN) tablet 5 mg  5 mg Oral Once per day on Mon Wed Fri Rosa Lopez MD   5 mg at 02/22/23 1829    enoxaparin (LOVENOX) injection 120 mg  1 mg/kg SubCUTAneous BID Rosa Lopez MD   120 mg at 02/22/23 2029    acetaminophen (TYLENOL) tablet 650 mg  650 mg Oral Q4H PRN Fauzia Wakefield MD   650 mg at 02/22/23 1545    metoprolol tartrate (LOPRESSOR) tablet 25 mg  25 mg Oral BID Sanjuanita Dense Sedar, DO   25 mg at 02/22/23 2029    rosuvastatin (CRESTOR) tablet 10 mg  10 mg Oral Daily Sanjuanita Dense Sedar, DO   10 mg at 02/22/23 1107    [Held by provider] losartan (COZAAR) tablet 100 mg  100 mg Oral Daily Sanjuanita Dense Sedar, DO        [Held by provider] furosemide (LASIX) tablet 20 mg  20 mg Oral Daily Sanjuanita Dense Sedar, DO        [Held by provider] aspirin EC tablet 81 mg  81 mg Oral Daily Agustin D Sedar, DO        pantoprazole (PROTONIX) 40 mg in sodium chloride (PF) 0.9 % 10 mL injection  40 mg IntraVENous Q12H Gloria Varela MD   40 mg at 02/23/23 0331    ondansetron (ZOFRAN-ODT) disintegrating tablet 4 mg  4 mg Oral Q8H PRN Sanjuanita Dense Sedar, DO        Or    ondansetron (ZOFRAN) injection 4 mg  4 mg IntraVENous Q6H PRN Ni DAVIS Sedar, DO        meropenem (MERREM) 1,000 mg in sodium chloride 0.9 % 100 mL IVPB (mini-bag)  1,000 mg IntraVENous Q8H Sanjuanita Dense Sedar, DO Stopped at 02/23/23 0135    melatonin disintegrating tablet 5 mg  5 mg Oral Nightly Andra Doshi Sedar, DO   5 mg at 02/22/23 2028    guaiFENesin [facility]) extended release tablet 600 mg  600 mg Oral BID Andra Doshi Sedar, DO   600 mg at 02/22/23 2028    ipratropium-albuterol (DUONEB) nebulizer solution 1 ampule  1 ampule Inhalation Q4H PRN Konrad Underwood DO        vancomycin (VANCOCIN) intermittent dosing (placeholder)   Other RX Placeholder Francy Antunez, DO        oxyCODONE (ROXICODONE) immediate release tablet 5 mg  5 mg Oral Q4H PRN Mariposa Rivera MD   5 mg at 02/22/23 2029    Or    oxyCODONE (ROXICODONE) immediate release tablet 10 mg  10 mg Oral Q4H PRN Mariposa Rivera MD   10 mg at 02/20/23 1313    naloxegol (MOVANTIK) tablet 25 mg  25 mg Oral QAROMINA Rivera MD   25 mg at 02/22/23 1107     Facility-Administered Medications Ordered in Other Encounters   Medication Dose Route Frequency Provider Last Rate Last Admin    sodium chloride flush 0.9 % injection 10 mL  10 mL IntraVENous PRN Olga Ennis MD   10 mL at 07/10/19 1220       PHYSICAL EXAM:    EYES:  Lids and lashes normal, pupils equal, round and reactive to light, extra ocular muscles intact, sclera clear, conjunctiva normal    ENT:  Normocephalic, without obvious abnormality, atraumatic, sinuses nontender on palpation, external ears without lesions, oral pharynx with moist mucus membranes, tonsils without erythema or exudates, gums normal and good dentition.     NECK:  Supple, symmetrical, trachea midline, no adenopathy, thyroid symmetric, not enlarged and no tenderness, skin normal    CHEST:    LUNGS:  No increased work of breathing, good air exchange, clear to auscultation bilaterally, no crackles or wheezing    CARDIOVASCULAR:  Normal apical impulse, regular rate and rhythm, normal S1 and S2, no S3 or S4, and no murmur noted    ABDOMEN:  No scars, normal bowel sounds, soft, non-distended, non-tender, no masses palpated, no hepatosplenomegally    MUSCULOSKELETAL:  There is no redness, warmth, or swelling of the joints. Full range of motion noted. Motor strength is 5 out of 5 all extremities bilaterally. Tone is normal.  EXTREMITIES:    NEURO:    DATA:      PT/INR:  No results found for: PTINR  PTT:    Lab Results   Component Value Date/Time    APTT 67.6 02/09/2023 12:58 PM     CMP:    Lab Results   Component Value Date/Time     02/23/2023 04:59 AM    K 3.4 02/23/2023 04:59 AM     02/23/2023 04:59 AM    CO2 20 02/23/2023 04:59 AM    BUN 27 02/23/2023 04:59 AM    PROT 5.7 02/19/2023 05:27 AM     Magnesium:    Lab Results   Component Value Date/Time    MG 2.2 02/23/2023 04:59 AM     Phosphorus:  No components found for: PO4  Calcium:  No results found for: CA  CBC:    Lab Results   Component Value Date/Time    WBC 7.3 02/23/2023 04:59 AM    RBC 2.54 02/23/2023 04:59 AM    HGB 7.3 02/23/2023 04:59 AM    HCT 22.7 02/23/2023 04:59 AM    MCV 89.6 02/23/2023 04:59 AM    RDW 15.7 02/23/2023 04:59 AM     02/23/2023 04:59 AM     DIFF:    Lab Results   Component Value Date/Time    MCV 89.6 02/23/2023 04:59 AM    RDW 15.7 02/23/2023 04:59 AM      LDH:  No results found for: LDH  Uric Acid:  No components found for: URIC    EKG Reviewed  Appropriate Radiology Reviewed      Pathology: Reviewed where indicated      ASSESSMENT:  Principal Problem:    Pleural effusion  Active Problems:    Melena    Gastritis without bleeding  Resolved Problems:    * No resolved hospital problems.  *    Patient Active Problem List   Diagnosis    Ventral hernia    Ventral hernia, recurrent    Polycythemia    Spinal stenosis of lumbar region with neurogenic claudication    Atherosclerosis of native artery of both lower extremities with intermittent claudication (HCC)    Atrial fibrillation (HCC)    BPH with obstruction/lower urinary tract symptoms    Congenital cystic kidney disease    Venous insufficiency    Intermittent claudication ([address] Transient ischemic attack    Sleep apnea    Rosacea    Fuchs' corneal dystrophy    Excessive daytime sleepiness    Hx of blood clots    Former smoker    Lumbar stenosis with neurogenic claudication    Congestive heart failure (HCC)    Lumbar spondylosis    Balance disorder    Postlaminectomy syndrome, lumbar region    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           Electronically signed by Jamaica Hernandez MD on 2/23/23 at 9:24 AM EST

## 2023-02-23 NOTE — PROGRESS NOTES
Gastroenterology Progress Note    Jessica Gonzales is a 82 y.o. male patient. Hospitalization Day:5    Chief C/O: Anemia, melena    SUBJECTIVE: Seen and examined, no overt GI bleeding, hemoglobin 7.3, hemodynamically stable, tolerating diet, no abdominal pain    ROS:  Gastrointestinal ROS: no abdominal pain, change in bowel habits, or black or bloody stools    Physical    VITALS:  BP (!) 115/59   Pulse 67   Temp 99.4 °F (37.4 °C) (Oral)   Resp 14   Ht 5' 7\" (1.702 m)   Wt 269 lb (122 kg)   SpO2 96%   BMI 42.13 kg/m²   TEMPERATURE:  Current - Temp: 99.4 °F (37.4 °C); Max - Temp  Av.2 °F (37.3 °C)  Min: 97.9 °F (36.6 °C)  Max: 100.4 °F (38 °C)    General:  Alert and oriented,  No apparent distress  Skin- without jaundice  Eyes: anicteric sclera  Cardiac: RRR, Nl s1s2, without murmurs  Lungs CTA Bilaterally, normal effort  Abdomen soft, ND, NT, no HSM, Bowel sounds normal  Ext: without edema  Neuro: no asterixis     Data    Data Review:    Recent Labs     23  0706 23  0619 23  0459   WBC 12.1* 8.5 7.3   HGB 8.2* 7.5* 7.3*   HCT 25.1* 22.9* 22.7*   MCV 89.5 89.8 89.6    208 217     Recent Labs     23  0706 23  0619 23  0459    141 137   K 4.3 3.9 3.4    108* 106   CO2 21 20 20   BUN 35* 31* 27*   CREATININE 1.51* 1.40* 1.37*     No results for input(s): AST, ALT, ALB, BILIDIR, BILITOT, ALKPHOS in the last 72 hours. No results for input(s): LIPASE, AMYLASE in the last 72 hours. Recent Labs     23  1528 23  0459   PROTIME 16.6* 17.7*   INR 1.3 1.4     Endoscopic hx:  EGD Dr Shannon Bowen 23  A 2 cm hernia was found. Esophagogastric landmarks were identified: the gastroesophageal junction was found at 36 cm, the lower esophageal sphincter was  found at 38 cm and the upper extent of the gastric folds was found at 36 cm from the incisors.   Patchy moderate inflammation characterized by erythema was found in the gastric antrum and in the gastric body.  The examined duodenum was normal.  The cardia and gastric fundus were normal on retroflexion. No old or fresh blood noted    ASSESSMENT:  81 y/o male admitted patient with shortness of breath, left pleural effusion in the context of recent diagnosis of mesothelioma status post radiation treatment 11/22. Since arrival had Pleurx catheter placed and shortness of breath is  improved. GI was asked to evaluate melena prior to arrival, He reportedly had several weeks of intermittent melanotic stool, none for the past 4 days. On arrival patient was found to have normocytic anemia, with hemoglobin of 8.2, baseline is 12.8, in the context of Coumadin, ASA, and meloxicam.  He denies heartburn, abdominal pain, dysphagia, unintentional weight loss, or changes to his bowel habits. No history of GI bleeding, no history of liver disease, no history of EGD. Most recent colonoscopy 2012, no records are available for review. He has been tolerating a regular diet, and is asymptomatic with no current overt bleeding  Ddx anemia and melena: Esophagitis, gastritis, peptic ulcer disease, AVM, malignancy however least likely  2/22/23 s/p EGD with no fresh of old blood identified, hemoglobin 7.5, no overt bleeding, no further melena, tolerating diet. 2/23/23 hemoglobin 7.3, followed by hematology, getting IV iron infusion, no overt GI bleeding, tolerating diet, no abdominal pain, no changes to bowel habits, pleural fluid positive for infection on IV antibiotics-ID is following, discussed proceeding with colonoscopy with the patient, given his current infection, patient declined to proceed at this time would like to follow-up as an outpatient.     PLAN :  -Continue course current medical management per primary team  -Continue IV PPI  daily  -No planned colonoscopy, patient can follow-up as an outpatient for further evaluation and timing of colonoscopy  -GI will sign off, please call if needed           Thank you for allowing me to participate in the care of your patient. Please feel free to contact me with any concerns.     EDELMIRA Fairchild - CNP

## 2023-02-23 NOTE — CONSULTS
Sabas Duval  1935  male  Medical Record Number: [id number]    Patient informed that I am an Infectious Disease physician and permission obtained from the patient to speak in front of any visitors prior to any discussion for HIPPA purposes. HPI     Patient is an 77-year-old male who was at [facility] for a possible nerve stimulator placement. When the procedure was initiated patient had a possible CSF leak and the procedure was aborted per MR  Postoperatively patient developed worsening shortness of breath and was brought to [facility] for repeat evaluation he is found to have a large loculated pleural effusion. Patient was started on empiric antibiotic therapy at [facility] ICU and transferred to [facility] for thoracotomy/VATS decortication. Had fluid culture that was positive S epi from pleural effusion. He is s/p Pleurex cath placement. Still short of breath.      Review of Systems: All 14 review of systems were discussed with the patient and are negative other than as stated above      Past Medical History:   Diagnosis Date    A-fib ([address]     approx 1 year ago-cardioverted    Arthritis     Baker's cyst of knee, left     Cancer ([address]     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    Cerebral artery occlusion with cerebral infarction ([address] 2000    TIA sx felt funny --     Congestive heart failure ([address] 03/08/2022    Coronary artery disease     HTN (hypertension)     meds > 20 yrs    Hx of blood clots 2012    DVT left leg     Hyperlipidemia     meds > 20 yrs    Pacemaker 07/19/2022    Polycythemia     Torn meniscus     left knee       Past Surgical History:   Procedure Laterality Date    BACK SURGERY      COLONOSCOPY  2009    COLONOSCOPY  2012    CORONARY ANGIOPLASTY WITH STENT PLACEMENT  10/2006    x 1 cardiac stent    ENDOSCOPY, COLON, DIAGNOSTIC      EYE SURGERY      [place] with IOL OU    HERNIA REPAIR  03/2013    redo ca mesh in [id number] -- umbilical hernia    JOINT REPLACEMENT Bilateral 2009 & 2011 Bilateral TKR    KNEE SURGERY Left      arthroscopic surgery to repair meniscus of left knee    LAMINECTOMY N/A 2021    RIGHT BILATERAL L2-3 3-4 4-5 MICRODECOMPRESSION performed by Wing Tejada MD at [facility] 22    PAIN MANAGEMENT PROCEDURE N/A 2023    ATTEMPTED SPINAL CORD STIMULATOR PERMANENT PLACEMENT performed by Murtaza Márquez MD at [address] N/A 1/10/2023    SPINAL CORD STIMULATOR TRIAL performed by Murtaza Márquez MD at [place]  age 8    UMBILICAL HERNIA REPAIR  2012    UPPER GASTROINTESTINAL ENDOSCOPY N/A 2023    EGD DIAGNOSTIC ONLY performed by Rosalind Boucher MD at [address] [place] [address] History    Marital status:      Spouse name: Not on file    Number of children: Not on file    Years of education: Not on file    Highest education level: Not on file   Occupational History    Not on file   Tobacco Use    Smoking status: Former     Packs/day: 0.50     Years: 10.00     Pack years: 5.00     Types: Cigarettes     Quit date: 1968     Years since quittin.8    Smokeless tobacco: Never   Vaping Use    Vaping Use: Never used   Substance and Sexual Activity    Alcohol use: Yes     Comment: social    Drug use: Never    Sexual activity: Not on file   Other Topics Concern    Not on file   Social History Narrative    Not on file     Social Determinants of Health     Financial Resource Strain: Not on file   Food Insecurity: Not on file   Transportation Needs: Not on file   Physical Activity: Not on file   Stress: Not on file   Social Connections: Not on file   Intimate Partner Violence: Not on file   Housing Stability: Not on file       Family History   Problem Relation Age of Onset    Heart Attack Mother     Other Mother          in [address]    Other Father          at age 80    Other Sister          as infant    Cancer Brother     Other Brother unknown medical hx    Other Brother          at age 61 due to accident    Cancer Daughter         colon & lung cancer    Colon Cancer Daughter     No Known Problems Son     Colon Cancer Other     Breast Cancer Other        Current Facility-Administered Medications on File Prior to Encounter   Medication Dose Route Frequency Provider Last Rate Last Admin    sodium chloride flush 0.9 % injection 10 mL  10 mL IntraVENous PRN Tonya Alfaro MD   10 mL at 07/10/19 1220     Current Outpatient Medications on File Prior to Encounter   Medication Sig Dispense Refill    carboxymethylcellulose (THERATEARS) 1 % ophthalmic gel Apply to eye      melatonin 5 MG TABS tablet Take by mouth      enoxaparin (LOVENOX) 100 MG/ML Inject into the skin 2 times daily      metoprolol tartrate (LOPRESSOR) 50 MG tablet Take 25 mg by mouth 2 times daily      Elastic Bandages & Supports (MEDICAL COMPRESSION STOCKINGS) MISC 1 each by Does not apply route daily Thigh high 20-30 mmhg graduated compression stockings both legs wear daily during day and off Qhs. Wear as tolerated. Do not wear if they cause increased pain. 1 each 5    losartan (COZAAR) 100 MG tablet Take 100 mg by mouth in the morning. meloxicam (MOBIC) 15 MG tablet Take 15 mg by mouth in the morning. (Patient not taking: Reported on 2023)      potassium chloride (KLOR-CON M) 20 MEQ extended release tablet Take 20 mEq by mouth in the morning and 20 mEq before bedtime. rosuvastatin (CRESTOR) 10 MG tablet TAKE 1 TABLET BY MOUTH EVERY DAY      furosemide (LASIX) 20 MG tablet Take 20 mg by mouth daily      warfarin (COUMADIN) 2.5 MG tablet Take 4 mg by mouth daily Indications: T--Sat - Sun Daily per INR levels      nitroGLYCERIN (NITROSTAT) 0.4 MG SL tablet Place 0.4 mg under the tongue every 5 minutes as needed for Chest pain up to max of 3 total doses. If no relief after 1 dose, call 911.       Omega-3 Fatty Acids (FISH OIL) 1200 MG CAPS Take by mouth daily      Cholecalciferol (VITAMIN D3) 125 MCG (5000 UT) TABS Take by mouth daily      CPAP Machine MISC by Does not apply route      warfarin (COUMADIN) 5 MG tablet Take 5 mg by mouth daily Indications: Qnpmhb-Ideqmjgac-Kxidvs Daily per INR levels      aspirin 81 MG EC tablet Take 81 mg by mouth daily. Physical Exam:  General: Patient appears in no acute distress, cooperative, obese. NC o2  Skin: no new rashes or erythema or induration  HEENT: EOMI, MMM, Neck is supple  Heart: S1 S2  Lungs: diminished overall due to body habitus  Abdomen: soft, ND, NTTP, +BS  Extrem:no calf pain  Neuro exam: CN II-XII intact, facial expressions symmertrical bilaterally, no slurred speech      Labs: I have reviewed all lab results by electronic record, including most recent CBC, metabolic panel, and pertinent abnormalities were addressed from an infectious disease perspective. WBC trends are being monitored. Lab Results   Component Value Date/Time     02/22/2023 06:19 AM    K 3.9 02/22/2023 06:19 AM     02/22/2023 06:19 AM    CO2 20 02/22/2023 06:19 AM    BUN 31 02/22/2023 06:19 AM    CREATININE 1.40 02/22/2023 06:19 AM    GLUCOSE 105 02/22/2023 06:19 AM    GLUCOSE 85 04/25/2012 01:30 PM    CALCIUM 8.6 02/22/2023 06:19 AM      Lab Results   Component Value Date    WBC 8.5 02/22/2023    HGB 7.5 (L) 02/22/2023    HCT 22.9 (L) 02/22/2023    MCV 89.8 02/22/2023     02/22/2023       Radiology:   I have reviewed imaging results per electronic record and most pertinent abnormalities are being addressed from an infectious disease standpoint. Assessment/ Plan:  Loculated exudative pleural effusion in the setting of mesothelioma  - has CONS in fluid - currently on meropenem and vancomycin.     Going down for CT chest.   Chronic pain with spinal stimulator  CSF leak recently  Sick sinus syndrome status post pacemaker  Hx of Polycythemia    Continue Vanco and Meropenem for now, given hx    This was a requested consult  Above is my noted response to the concern that prompted the consult. Communication directed toward referring primary.      Pearl Powell D.O.

## 2023-02-23 NOTE — PROGRESS NOTES
Hospitalist Progress Note      PCP: Ernie Franco DO    Date of Admission: 2/18/2023    Chief Complaint:    No chief complaint on file. Subjective:  Patient denies fevers, chills, sweats, CP, SOB, diarrhea or burning micturition. Sitting up in bed. Feels improed. 12 point ROS negative other than mentioned above     Medications:  Reviewed    Infusion Medications   Scheduled Medications    vancomycin  1,250 mg IntraVENous Q24H    warfarin  4 mg Oral Once per day on Sun Tue Thu Sat    warfarin  5 mg Oral Once per day on Mon Wed Fri    enoxaparin  1 mg/kg SubCUTAneous BID    metoprolol tartrate  25 mg Oral BID    rosuvastatin  10 mg Oral Daily    [Held by provider] losartan  100 mg Oral Daily    furosemide  20 mg Oral Daily    aspirin  81 mg Oral Daily    pantoprazole (PROTONIX) 40 mg injection  40 mg IntraVENous Q12H    meropenem  1,000 mg IntraVENous Q8H    melatonin  5 mg Oral Nightly    guaiFENesin  600 mg Oral BID    vancomycin (VANCOCIN) intermittent dosing (placeholder)   Other RX Placeholder    naloxegol  25 mg Oral QAM     PRN Meds: acetaminophen, ondansetron **OR** ondansetron, ipratropium-albuterol, oxyCODONE **OR** oxyCODONE    No intake or output data in the 24 hours ending 02/23/23 1325      Exam:    /65   Pulse 71 Comment: Apical  Temp 99.4 °F (37.4 °C) (Oral)   Resp 16   Ht 5' 7\" (1.702 m)   Wt 269 lb (122 kg)   SpO2 96%   BMI 42.13 kg/m²     General appearance: No apparent distress, appears stated age and cooperative. HEENT:  Conjunctivae/corneas clear. Neck: Trachea midline. Respiratory:  Normal respiratory effort. Clear to auscultation  Cardiovascular: Regular rate and rhythm  Abdomen: Soft, non-tender, non-distended with normal bowel sounds.   Musculoskeletal: No clubbing, cyanosis or edema bilaterally  Neuro: Non Focal.   Capillary Refill: Brisk,< 3 seconds   Peripheral Pulses: +2 palpable, equal bilaterally     Labs:   Recent Labs     02/21/23  0706 02/22/23  6027 02/23/23  0459   WBC 12.1* 8.5 7.3   HGB 8.2* 7.5* 7.3*   HCT 25.1* 22.9* 22.7*    208 217     Recent Labs     02/21/23  0706 02/22/23  0619 02/23/23  0459    141 137   K 4.3 3.9 3.4    108* 106   CO2 21 20 20   BUN 35* 31* 27*   CREATININE 1.51* 1.40* 1.37*   CALCIUM 8.8 8.6 8.2*     No results for input(s): AST, ALT, BILIDIR, BILITOT, ALKPHOS in the last 72 hours. Recent Labs     02/22/23  1528 02/23/23 0459   INR 1.3 1.4       No results for input(s): Ariella Smack in the last 72 hours. Urinalysis:      Lab Results   Component Value Date/Time    NITRU Negative 02/19/2023 03:13 PM    WBCUA 3-5 02/19/2023 03:13 PM    BACTERIA Negative 02/19/2023 03:13 PM    RBCUA 6-10 02/19/2023 03:13 PM    BLOODU TRACE 02/19/2023 03:13 PM    SPECGRAV 1.028 02/19/2023 03:13 PM    GLUCOSEU Negative 02/19/2023 03:13 PM       Radiology:  CT CHEST WO CONTRAST   Final Result   Indwelling chest tube identified on the left with small left pleural effusion   and trace anterior pneumothorax. Consolidation seen in airspace disease   throughout the left upper and lower lobes to suggest a multifocal   superimposed pneumonia. Soft tissue density seen in the hilar region to   suggest possible reactive adenopathy. Moderate-sized pericardial effusion. Minimal atelectatic changes seen at the right lung base. Incidental stones   in the gallbladder with small hiatal hernia. US DUP UPPER EXTREMITY RIGHT VENOUS   Final Result   No evidence of DVT. XR CHEST PORTABLE   Final Result   1. Minimal partial clearing of the left lung. The previously noted left   pleural effusion is smaller   2. Stable position of the left chest tube   3. The right lung is clear. XR CHEST PORTABLE   Final Result   1. Apparent interval placement of left chest tube catheter. No pneumothorax. 2.  Persistent opacification of the left hemithorax. XR CHEST PORTABLE   Final Result   1.  Near complete whiteout of the left hemithorax likely represent a   combination of partial collapse of the left lung and large pleural effusion   2. Cardiomegaly   3. The right lung is grossly clear. XR ABDOMEN (KUB) (SINGLE AP VIEW)   Final Result   1. Complete opacification of left hemithorax compatible with some   combination of pleural effusion and atelectasis. Secondary obscuration of   left heart border. 2.  Nonobstructive bowel gas pattern. No acute abdominal disease identified. 3.  Probable left renal stone. Assessment/Plan:    Loculated pleural effusion: Chest tube in place; possibly related to mesothelioma; pain regimen is doing well now  2/22 -initially patient prepared for discharge today however fluid culture noted positive today. Patient was febrile x1. Discussed with pulmonology that patient will likely require IV antibiotics for extended period. Infectious disease consultation. CT chest ordered. Will likely need PICC line and possible placement. Initial DC plan held  2/23 - await id recs appreciate cts and pulm input. 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 Monitor on clears overnight, probable dc home tomorrow. atrial fibrillation: Hold warfarin for now. Continue metoprolol for rate control. Chronic pain with spinal stimulator: Consult to pain management   Sick sinus syndrome status post pacemaker: Continue beta-blocker  SCOTTY escalate to BiPAP before surgery. 5 L bleed in O2  Polycythemia: Follow CBC.   Stat CBC now        Active Hospital Problems    Diagnosis Date Noted    Gastritis without bleeding [K29.70] 02/21/2023     Priority: Medium    Melena [K92.1] 02/20/2023     Priority: Medium    Pleural effusion [J90] 02/18/2023     Priority: Medium        Additional work up or/and treatment plan may be added today or then after based on clinical progression. I am managing a portion of pt care. Some medical issues are handled by other specialists. Additional work up and treatment should be done in out pt setting by pt PCP and other out pt providers. In addition to examining and evaluating pt, I spent additional time explaining care, normal and abnormal findings, and treatment plan. All of pt questions were answered. Counseling, diet and education were  provided. Case will be discussed with nursing staff when appropriate. Family will be updated if and when appropriate. Diet: ADULT DIET; Regular;  Low Sodium (2 gm)    Code Status: Full Code    PT/OT Eval     Electronically signed by Lin Barillas MD on 2/23/2023 at 1:25 PM

## 2023-02-23 NOTE — CONSULTS
Physical Medicine & Rehabilitation  Consult Note      Admitting Physician: Sari Hayes MD    Primary Care Provider: Rodney Ridley DO     Reason for Consult:  Asses rehab needs, promote physical and mental function, analyze level of care to determine rehab needs, improve ability to actively participate in the rehabilitation process, and decrease likelihood of re-admit to the hospital after discharge. History of Present Illness:    Eliana Gayle is a 82 y.o. male admitted to [facility] on 2/18/2023. Patient has been struggling with pulmonary problems related to community-acquired pneumonia. He had previously been to [facility] and was admitted now to [facility] since 2/18/2023. He is currently on IV Merrem. Fatigue  This is a recurrent problem. Associated symptoms include arthralgias, coughing and fatigue. Pertinent negatives include no abdominal pain, chest pain, chills, diaphoresis, fever, joint swelling, nausea, rash, sore throat or vomiting. The treatment provided moderate relief. I reviewed recent nursing notes discussed care with acute care providers, \" Lab called with a critical h/h of 7.0/20. 3. Pt asymptomatic. VSS. Messaged NP. Waiting for response. Will continue to monitor. \".   Events from the previous 24 hours reviewed    . Their inpatient work up has included:    Imaging:  Imaging and other studies reviewed and discussed with patient and staff    XR ABDOMEN (KUB)  2/18/2023 Complete opacification of left hemithorax compatible with some combination of pleural effusion and atelectasis. Obscuration of the left heart border. Pericardial effusion not excluded. Dual chamber transvenous pacemaker in place. The lower pelvis was not imaged. Nonobstructive bowel gas pattern. The stomach, small bowel, and colon are nondilated. 9 mm calcification compatible with a stone at the midpole of the left kidney. Degenerative changes of the lumbar spine. 1.  Complete opacification of left hemithorax compatible with some combination of pleural effusion and atelectasis. Secondary obscuration of left heart border. 2.  Nonobstructive bowel gas pattern. No acute abdominal disease identified. 3.  Probable left renal stone. CT CHEST   2/23/2023 Mediastinum: Thyroid is homogeneous in appearance. Vascular calcifications seen within the coronary vessels. Cardiac chambers are mildly enlarged. Pacer leads in satisfactory position. Moderate-sized pericardial effusion. Bulky adenopathy identified in the left hilar region likely reactive. Small lymph nodes seen scattered throughout the mediastinum likely reactive. Atherosclerotic disease seen diffusely throughout the thoracic aorta. Lungs/pleura: There is small chest tube identified in place on the left with small left pleural effusion. Airspace consolidation seen within the left upper and lower lung fields suggesting superimposed infiltrate such as pneumonia. Mild increased markings identified at the right lung base to suggest atelectatic change. Trace pneumothorax identified anteriorly. Upper Abdomen: Stones in the gallbladder. Small hiatal hernia. Soft Tissues/Bones: Multilevel degenerative changes seen within the spine. No acute chest wall abnormality. Indwelling chest tube identified on the left with small left pleural effusion and trace anterior pneumothorax. Consolidation seen in airspace disease throughout the left upper and lower lobes to suggest a multifocal superimposed pneumonia. Soft tissue density seen in the hilar region to suggest possible reactive adenopathy. Moderate-sized pericardial effusion. Minimal atelectatic changes seen at the right lung base. Incidental stones in the gallbladder with small hiatal hernia. XR CHEST 2/19/2023  Note is made of a left chest tube. There is no left pneumothorax. There is been partial clearing of the previously noted left pleural effusion. Significant residual airspace disease as well as a residual pleural effusion is noted The right lung is clear. There is a dual lead cardiac pacer on the left. 1. Minimal partial clearing of the left lung. The previously noted left pleural effusion is smaller   2. Stable position of the left chest tube   3. The right lung is clear. XR CHEST 2/18/2023  There is opacification of the left hemithorax. No evidence of pneumothorax. Air bronchograms are noted in the perihilar region. Since the prior study there appears to been placement of a left-sided chest tube with its tip near the left upper chest apex. Right-sided dual lead pacemaker is unchanged. Heart appears enlarged. Mild reticular opacities in the right lung are unchanged. Stable degenerative changes in the left shoulder and AC joint. 1.  Apparent interval placement of left chest tube catheter. No pneumothorax. 2.  Persistent opacification of the left hemithorax. XR CHEST   2/18/2023  The cardiac silhouette appears enlarged. Please note the left heart border is obscured due to the left lung pathology. There is near complete whiteout of the left hemithorax. The finding could represent a combination of partial collapse of the left lung and large pleural effusion. There is no right lung infiltrate or right pleural effusion. 1. Near complete whiteout of the left hemithorax likely represent a combination of partial collapse of the left lung and large pleural effusion 2. Cardiomegaly 3. The right lung is grossly clear. US DUP UPPER EXTREMITY RIGHT VENOUS    Result Date: 2/20/2023  There is normal flow and compressibility of the visualized venous structures. There is no evidence of echogenic thrombus. The veins demonstrate good compressibility with normal color flow study and spectral analysis. No evidence of DVT. FLUORO FOR SURGICAL PROCEDURES    Result Date: 2/16/2023 12 spot images of the lumbar spine were obtained. Intraprocedural fluoroscopic spot images as above. See separate procedure report for more information. EGD 2023  [id number] [facility] Patient: Rajan Mclaughlin MRN: [id number] : 1935 Account: Gender: Male Age: 82 Years Procedure: Upper GI endoscopy Date: 2023 Attending Physician: Alex Law Indications:        -  Anemia        -  Melena Medications:        -  Monitored Anesthesia Care Complications:        -  No immediate complications. Estimated Blood Loss:        -  Estimated blood loss: none. Procedure:        - The Gastroscope was introduced through the mouth and advanced to the second           part of the duodenum.        -  The patient tolerated the procedure well. Findings:        -  A 2 cm hernia was found.        -  Esophagogastric landmarks were identified: the gastroesophageal junction           was found at 36 cm, the lower esophageal sphincter was found at 38 cm and the           upper extent of the gastric folds was found at 36 cm from the incisors. -  Patchy moderate inflammation characterized by erythema was found in the           gastric antrum and in the gastric body. -  The examined duodenum was normal.        -  The cardia and gastric fundus were normal on retroflexion.        - No old or fresh blood noted Impression:        -  2 cm hernia. -  Esophagogastric landmarks identified.        -  Gastritis. -  Normal examined duodenum.        -  No specimens collected. - No old or fresh blood noted Recommendation:        -  Put patient on a clear liquid diet starting today. -  Continue present medications.         - Further recommendations per inpatient team Procedure Code(s):        - 81383, Esophagogastroduodenoscopy, flexible, transoral; diagnostic,           including collection of specimen(s) by brushing or washing, when performed           (separate procedure) Diagnosis Code(s):        - D64.9, Anemia, unspecified        - K92.1, Melena (includes Hematochezia)        - K44.9, Diaphragmatic hernia without obstruction or gangrene        - K29.70, Gastritis, unspecified, without bleeding       CPT(R) - 2022 copyright [employer]. All Rights Reserved. The CPT codes, CCI edits and ICD codes generated are intended as suggestions       and were generated based on input data. These codes are preliminary and upon        review may be revised to meet current compliance and payer requirements. The provider is responsible for the final determination of appropriate codes,       and modifiers. Scope Withdrawal Time:       00:07:45 Yaa Wick MD This document has been electronically signed.  Note Initiated:2/21/2023 Note Completed:2/21/2023 8:58 AM             Labs:    Labs reviewed and discussed with patient and staff    Lab Results   Component Value Date/Time    POCGLU 113 11/01/2013 03:39 PM    POCGLU 137 11/01/2013 11:16 AM    POCGLU 105 11/01/2013 07:52 AM     Lab Results   Component Value Date/Time     02/24/2023 04:31 AM    K 3.5 02/24/2023 04:31 AM     02/24/2023 04:31 AM    CO2 23 02/24/2023 04:31 AM    BUN 24 02/24/2023 04:31 AM    CREATININE 1.35 02/24/2023 04:31 AM    CALCIUM 7.9 02/24/2023 04:31 AM    LABALBU 3.1 02/19/2023 05:27 AM    LABALBU 4.5 04/25/2012 01:30 PM    BILITOT 0.3 02/19/2023 05:27 AM    ALKPHOS 73 02/19/2023 05:27 AM    AST 27 02/19/2023 05:27 AM    ALT 12 02/19/2023 05:27 AM     Lab Results   Component Value Date/Time    WBC 7.0 02/24/2023 04:32 AM    RBC 2.29 02/24/2023 04:32 AM    HGB 7.0 02/24/2023 04:32 AM    HCT 20.3 02/24/2023 04:32 AM    MCV 88.7 02/24/2023 04:32 AM    MCH 30.5 02/24/2023 04:32 AM    MCHC 34.4 02/24/2023 04:32 AM    RDW 15.7 02/24/2023 04:32 AM     02/24/2023 04:32 AM    MPV 8.2 11/01/2013 05:36 AM     Lab Results   Component Value Date/Time    VITD25 31.4 02/20/2023 05:27 AM     Lab Results   Component Value Date/Time    COLORU Yellow 02/19/2023 03:13 PM    NITRU Negative 02/19/2023 03:13 PM    GLUCOSEU Negative 02/19/2023 03:13 PM    KETUA TRACE 02/19/2023 03:13 PM    UROBILINOGEN 0.2 02/19/2023 03:13 PM    BILIRUBINUR Negative 02/19/2023 03:13 PM     Lab Results   Component Value Date/Time    PROTIME 19.3 02/24/2023 04:31 AM     Lab Results   Component Value Date/Time    INR 1.6 02/24/2023 04:31 AM         I discussed results with patient. Current Rehabilitation Assessments:    Rehabilitation:  Physical Therapy  Bed mobility:  Bed mobility  Rolling to Left: Stand by assistance (02/23/23 1336)  Rolling to Right: Stand by assistance (02/23/23 1336)  Supine to Sit: Moderate assistance (assist for trunk required) (02/23/23 1336)  Scooting: Stand by assistance (along edge of bed) (02/23/23 1336)  Bed Mobility Comments: HOB flat  - intermittent use of rails required (02/23/23 1336)  Transfers:  Transfers  Sit to Stand: Minimal Assistance; Moderate Assistance (heavy pull on rail in toilet transfer noted) (02/23/23 1337)  Stand to Sit: Minimal Assistance; Moderate Assistance (02/23/23 1337)  Comment: multiple trials with varying assistance (02/23/23 1337)  Gait:   Ambulation  Surface: Level tile (02/23/23 1340)  Device: Rolling Walker (02/23/23 1340)  Other Apparatus: O2 (02/23/23 1340)  Assistance: Minimal assistance (02/23/23 1340)  Quality of Gait: forward flexed trunk, heavy UE use and support, short step length, knee stability fair, SOB following  - 2 L O2 in place with at  95% (02/23/23 1340)  Distance: 25 feet x 2 (02/23/23 1340)  Stairs:  Stairs/Curb  Stairs?: No (02/23/23 1340)  W/C mobility:         Occupational therapy:   Hand Dominance: Right  ADL  Feeding: Unable to assess(comment) (02/23/23 1348)  Grooming: Stand by assistance; Increased time to complete (02/23/23 1348)  UE Bathing: Minimal assistance; Increased time to complete (02/23/23 1348)  LE Bathing: Moderate assistance; Increased time to complete (02/23/23 1348)  UE Dressing: Minimal assistance; Increased time to complete (02/23/23 1348)  LE Dressing: Maximum assistance; Increased time to complete (02/23/23 1348)  Toileting: Maximum assistance; Increased time to complete (02/23/23 1348)  Toilet Transfers  Toilet - Technique: Ambulating (02/23/23 1350)  Equipment Used: Grab bars (02/23/23 1350)  Toilet Transfer: Minimal assistance (02/23/23 1350)            Speech therapy:            Diet/Swallow:                         COGNITION  OT: Cognition Comment: follows commands consistently  SP:             Re-evals pending      Past Medical History:        Diagnosis Date    A-fib ([facility] [address]     approx 1 year ago-cardioverted    Arthritis     Baker's cyst of knee, left     Cancer ([address]     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    Cerebral artery occlusion with cerebral infarction ([facility] [address] 2000    TIA sx felt funny --     Congestive heart failure ([facility] [address] 03/08/2022    Coronary artery disease     HTN (hypertension)     meds > 20 yrs    Hx of blood clots 2012    DVT left leg     Hyperlipidemia     meds > 20 yrs    Pacemaker 07/19/2022    Polycythemia     Torn meniscus     left knee         PastSurgical History:        Procedure Laterality Date    BACK SURGERY      COLONOSCOPY  2009    COLONOSCOPY  2012    CORONARY ANGIOPLASTY WITH STENT PLACEMENT  10/2006    x 1 cardiac stent    ENDOSCOPY, COLON, DIAGNOSTIC      EYE SURGERY      Phaco with IOL OU    HERNIA REPAIR  03/2013    redo ca mesh in [id number].5411 -- umbilical hernia    JOINT REPLACEMENT Bilateral 2009 & 2011    Bilateral TKR    KNEE SURGERY Left      arthroscopic surgery to repair meniscus of left knee    LAMINECTOMY N/A 02/08/2021    RIGHT BILATERAL L2-3 3-4 4-5 MICRODECOMPRESSION performed by Shlomo French MD at [facility] 7/19/22    PAIN MANAGEMENT PROCEDURE N/A 2/16/2023    ATTEMPTED SPINAL CORD STIMULATOR PERMANENT PLACEMENT performed by Keanu Wellington MD at [address] [place] [address] 1/10/2023    SPINAL CORD STIMULATOR TRIAL performed by Delicia Baeza MD at [facility]  age 8    [address]  03/2012    UPPER GASTROINTESTINAL ENDOSCOPY N/A 2/21/2023    EGD DIAGNOSTIC ONLY performed by Claude Mile, MD at [facility]         Allergies:     Allergies   Allergen Reactions    Benadryl [Diphenhydramine Hcl] Anxiety    Diphenhydramine Anxiety     anxious          CurrentMedications:   Current Facility-Administered Medications: warfarin (COUMADIN) tablet 4 mg, 4 mg, Oral, Once per day on Sun Tue Thu Sat  warfarin (COUMADIN) tablet 5 mg, 5 mg, Oral, Once per day on Mon Wed Fri  enoxaparin (LOVENOX) injection 120 mg, 1 mg/kg, SubCUTAneous, BID  acetaminophen (TYLENOL) tablet 650 mg, 650 mg, Oral, Q4H PRN  metoprolol tartrate (LOPRESSOR) tablet 25 mg, 25 mg, Oral, BID  rosuvastatin (CRESTOR) tablet 10 mg, 10 mg, Oral, Daily  [Held by provider] losartan (COZAAR) tablet 100 mg, 100 mg, Oral, Daily  furosemide (LASIX) tablet 20 mg, 20 mg, Oral, Daily  aspirin EC tablet 81 mg, 81 mg, Oral, Daily  pantoprazole (PROTONIX) 40 mg in sodium chloride (PF) 0.9 % 10 mL injection, 40 mg, IntraVENous, Q12H  ondansetron (ZOFRAN-ODT) disintegrating tablet 4 mg, 4 mg, Oral, Q8H PRN **OR** ondansetron (ZOFRAN) injection 4 mg, 4 mg, IntraVENous, Q6H PRN  meropenem (MERREM) 1,000 mg in sodium chloride 0.9 % 100 mL IVPB (mini-bag), 1,000 mg, IntraVENous, Q8H  melatonin disintegrating tablet 5 mg, 5 mg, Oral, Nightly  guaiFENesin (MUCINEX) extended release tablet 600 mg, 600 mg, Oral, BID  ipratropium-albuterol (DUONEB) nebulizer solution 1 ampule, 1 ampule, Inhalation, Q4H PRN  oxyCODONE (ROXICODONE) immediate release tablet 5 mg, 5 mg, Oral, Q4H PRN **OR** oxyCODONE (ROXICODONE) immediate release tablet 10 mg, 10 mg, Oral, Q4H PRN  naloxegol (MOVANTIK) tablet 25 mg, 25 mg, Oral, QAM  Facility-Administered Medications Ordered in Other Encounters: sodium chloride flush 0.9 % injection 10 mL, 10 mL, IntraVENous, PRN      Social History:  Social History     Socioeconomic History    Marital status:      Spouse name: Roseanne    Number of children: Not on file    Years of education: Not on file    Highest education level: Not on file   Occupational History    Not on file   Tobacco Use    Smoking status: Former     Packs/day: 0.50     Years: 10.00     Pack years: 5.00     Types: Cigarettes     Quit date: 1968     Years since quittin.8    Smokeless tobacco: Never   Vaping Use    Vaping Use: Never used   Substance and Sexual Activity    Alcohol use: Yes     Comment: social    Drug use: Never    Sexual activity: Not on file   Other Topics Concern    Not on file   Social History Narrative    Lives With: Spouse Ashleigh    Type of Home: House in [facility] [address] [facility] [address]: One level (basement)    Home Access: Stairs to enter without rails (hoolds on to door jamb)    Entrance Stairs - Number of Steps: 2    Bathroom Shower/Tub: Walk-in shower    Home Equipment: Walker, rolling, Rollator, Cane (uses rollator outside)    Has the patient had two or more falls in the past year or any fall with injury in the past year?: Yes (broken rib and puncture lung)    ADL Assistance: Independent    Homemaking Assistance: Needs assistance (staniding tolerance is limited)    Ambulation Assistance: Independent (ww)    Transfer Assistance: Independent    Active : No    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       Family History:       Problem Relation Age of Onset    Heart Attack Mother     Other Mother          in MVA    Other Father          at age 80    Other Sister          as infant    Cancer Brother     Other Brother         unknown medical hx    Other Brother          at age 61 due to accident    Cancer Daughter         colon & lung cancer    Colon Cancer Daughter     No Known Problems Son     Colon Cancer Other     Breast Cancer Other        Review of Systems:  Review of Systems   Constitutional:  Positive for activity change and fatigue. Negative for appetite change, chills, diaphoresis, fever and unexpected weight change. HENT:  Negative for sore throat, trouble swallowing and voice change. Eyes:  Negative for visual disturbance. Respiratory:  Positive for cough and shortness of breath. Negative for choking, chest tightness, wheezing and stridor. Cardiovascular:  Negative for chest pain, palpitations and leg swelling. Gastrointestinal:  Negative for abdominal distention, abdominal pain, diarrhea, nausea and vomiting. Genitourinary:  Negative for difficulty urinating. Musculoskeletal:  Positive for arthralgias and back pain. Negative for gait problem and joint swelling. Skin:  Positive for wound. Negative for rash. Neurological:  Positive for dizziness. Negative for seizures and light-headedness. Hematological:  Negative for adenopathy. Does not bruise/bleed easily. Psychiatric/Behavioral:  Positive for decreased concentration and dysphoric mood. Negative for behavioral problems and confusion. Physical Exam:  BP (!) 111/55   Pulse 51   Temp 99.9 °F (37.7 °C) (Oral)   Resp 16   Ht 5' 7\" (1.702 m)   Wt 269 lb (122 kg)   SpO2 100%   BMI 42.13 kg/m²      Physical Exam  Constitutional:       General: He is not in acute distress. Appearance: He is obese. He is not ill-appearing, toxic-appearing or diaphoretic. HENT:      Head: Normocephalic and atraumatic.       Nose: Nose normal.   Eyes: Extraocular Movements: Extraocular movements intact. Conjunctiva/sclera: Conjunctivae normal.      Pupils: Pupils are equal, round, and reactive to light. Neck:      Vascular: No carotid bruit. Cardiovascular:      Rate and Rhythm: Normal rate and regular rhythm. Heart sounds: Normal heart sounds. No murmur heard. No gallop. Pulmonary:      Effort: Respiratory distress present. Breath sounds: No wheezing or rales. Abdominal:      General: Abdomen is flat. Bowel sounds are normal.      Palpations: Abdomen is soft. There is no mass. Tenderness: There is no abdominal tenderness. Musculoskeletal:         General: No swelling. Cervical back: Neck supple. Right lower leg: No edema. Left lower leg: No edema. Lymphadenopathy:      Cervical: No cervical adenopathy. Skin:     General: Skin is warm and dry. Neurological:      General: No focal deficit present. Mental Status: He is alert and oriented to person, place, and time. Psychiatric:         Mood and Affect: Mood normal.         Behavior: Behavior normal.         Thought Content: Thought content normal.         Judgment: Judgment normal.     Ortho Exam  Neurologic Exam     Mental Status   Oriented to person, place, and time. Cranial Nerves     CN III, IV, VI   Pupils are equal, round, and reactive to light.           Diagnostics:    Recent Results (from the past 24 hour(s))   Basic Metabolic Panel w/ Reflex to MG    Collection Time: 02/24/23  4:31 AM   Result Value Ref Range    Sodium 140 135 - 144 mEq/L    Potassium reflex Magnesium 3.5 3.4 - 4.9 mEq/L    Chloride 108 (H) 95 - 107 mEq/L    CO2 23 20 - 31 mEq/L    Anion Gap 9 9 - 15 mEq/L    Glucose 110 (H) 70 - 99 mg/dL    BUN 24 (H) 8 - 23 mg/dL    Creatinine 1.35 (H) 0.70 - 1.20 mg/dL    Est, Glom Filt Rate 51.2 (L) >60    Calcium 7.9 (L) 8.5 - 9.9 mg/dL   Protime-INR    Collection Time: 02/24/23  4:31 AM   Result Value Ref Range    Protime 19.3 (H) 12.3 - 14.9 sec    INR 1.6    Magnesium    Collection Time: 02/24/23  4:31 AM   Result Value Ref Range    Magnesium 2.1 1.7 - 2.4 mg/dL   CBC with Auto Differential    Collection Time: 02/24/23  4:32 AM   Result Value Ref Range    WBC 7.0 4.8 - 10.8 K/uL    RBC 2.29 (L) 4.70 - 6.10 M/uL    Hemoglobin 7.0 (LL) 14.0 - 18.0 g/dL    Hematocrit 20.3 (LL) 42.0 - 52.0 %    MCV 88.7 79.0 - 92.2 fL    MCH 30.5 27.0 - 31.3 pg    MCHC 34.4 33.0 - 37.0 %    RDW 15.7 (H) 11.5 - 14.5 %    Platelets 521 445 - 919 K/uL    Neutrophils % 73.8 %    Lymphocytes % 8.7 %    Monocytes % 12.4 %    Eosinophils % 4.6 %    Basophils % 0.5 %    Neutrophils Absolute 5.2 1.4 - 6.5 K/uL    Lymphocytes Absolute 0.6 (L) 1.0 - 4.8 K/uL    Monocytes Absolute 0.9 (H) 0.2 - 0.8 K/uL    Eosinophils Absolute 0.3 0.0 - 0.7 K/uL    Basophils Absolute 0.0 0.0 - 0.2 K/uL   Vancomycin Level, Random    Collection Time: 02/24/23  4:32 AM   Result Value Ref Range    Vancomycin Rm 15.7 10.0 - 40.0 ug/mL              Impression:    Impaired mobility and ADLs due to severe pulmonary debility  Factors favoring recovery include:  near independent premorbid function        Complex Active General Medical Issues that complicate care and require daily medical supervision: 1. Principal Problem:    Pleural effusion  Active Problems:    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[address]    Melena    Gastritis without bleeding    Impaired mobility and activities of daily living    Atrial fibrillation (HCC)    Lumbar stenosis with neurogenic claudication    Congestive heart failure ([address]  Resolved Problems:    * No resolved hospital problems.  *            Recommendations:    Considering all of the factors above including the patient's current medical status, social status/home environment, their functional needs, and their ability to participate in a therapy program, I feel that they would best be served at:    acute intensive comprehensive inpatient rehabilitation program.  Due to the diagnoses above the patient has had significant decline in function and is now requiring acute intensive therapy to optimize function. They are expected to achieve meaningful functional gains. Due to medical complexity as above, rehabilitation physician services is reasonable and necessary including face-to-face visits at least 3 days/week with anticipated need to treat, manage and modify the rehabilitation course of treatment including interdisciplinary team conferences. They will require rehab physician care to monitor for neurogenic bowel bladder, postoperative pain, titration of opiate and high risk medications,   blood pressure and blood sugar control. It is my opinion that they will be able to tolerate and benefit from 3 hours of therapy a day. I reviewed the various options re: levels of care with the patient and family. Please see pre-admission screen note for further details. I discussed acute rehab with the patient and verify that the patient is able and willing to participate in 3 hours of therapy a day. Rehab and Acute Care Case Management has also reinforced this expectation. This patient requires multidisciplinary rehabilitation treatment, including daily care and management from a PM&R physician, 24-hour rehabilitation nursing, Physical Therapy, Occupational Therapy, rehabilitation psychology, consideration of speech and language pathology, recreational therapy, nutritional services, and a rehabilitation . I feel that it is reasonable to plan for a discharge to home setting after acute rehab. Specialized nursing care to focus on: Bowel and bladder issues-Monitor for urinary retention-check PVRs, bladder scan--cath if no void. Wound management re  risk -pressure relief protocols-side to side turns  IV medication administration  -IV Merrem    Monitor endurance and if necessary spread therapy out over a 7-day window-adding scheduled rest breaks when needed. Focus on energy conservation. Monitor heart rate and   cardiac medications effects on heart rate and blood pressure before, during and after therapy. Progress toward endurance training with pulse ox monitoring for saturation and heart rate. continue to monitor closely for dehydration-- Improve hydration and nutrition by adding Vitamin B12 shot times one, adding Protein supplements and push PO fluids. Treat and monitor for higher level cognitive deficits, focus on difficulty with sequencing and problem-solving. Focus on higher-level balance and falls risk issues focusing on balance training and monitoring for orthostasis. Above recommendations are indicated to address medical complexity and need for appropriate rehab services. Will tailor individual care and rehab plan per individuals needs re  -goal stability . Focus of today's plan-   to acute rehab when medically cleared      Required Certification Data (potential inpatient rehabilitation facility patient's only)    Deficits:weakness, nutrition, mobility, impaired gait, deconditioning , debility, and ADL's    Disability:mobility, locomotion, self care, and cognitive    Potential barriers to progress/discharge:complex medical conditions and complex social situations         It was my pleasure to evaluate Tyree Jordan today. Please call [phone] with questions.     Quirino Torres, DO

## 2023-02-23 NOTE — PROGRESS NOTES
[facility] Occupational Therapy      Date: 2023  Patient Name: Gloria Oglesby        MRN: [id number]  Account: [de-identified]   : 1935  (82 y.o.)  Room: [place] [address]    Chart reviewed, attempted OT at [phone] for evaluation. Patient not seen 2° to:    Pt. declined, stating: \"Too fatigued. \"     Will attempt again when able.     Electronically signed by NAIN Kat on  at 11:19 AM

## 2023-02-23 NOTE — CONSULTS
Cardiology Consult Note      Date:   2/23/2023  Patient name:  Garth Rowe  Date of admission:  2/18/2023  3:39 AM  MRN:   [id number]  YOB: 1935  Time of Consult:  9:04 AM    Consulting Cardiologist: Dr. Amado Guerra APRN-CNP  Primary Cardiologist:  Dr. Carlos Enrique Weston    Referring Provider: Dr. Abdullahi Dewitt MD    Consult Reason:  Pericardial effusion    Assessment:  Pericardial effusion; CT of the chest 2/23/2023 showed moderate size pericardial effusion.  Will obtain echocardiogram  Pleural effusions with large loculated area for which CT has been placed.  Thoracic surgery Dr. Dewitt following.  Paroxysmal Atrial fibrillation; Rate controlled on Metoprolol   Long-term anticoagulation: Coumadin currently on hold receiving Lovenox full dose 2 times a day  CAD with history of remote PCI and stenting.  Will obtain baseline EKG    Plan  1.  Monitor on telemetry  2.  Obtain baseline EKG  3.  Obtain echocardiogram to assess pericardial effusion  4.  Continue cardiac medications  5.  Further recommendations pending echocardiogram  6. Further recommendations per Dr. Oh      HPI:   Garth Rowe is a 87 y.o.  male patient who is being at the request of Dr. Dewitt for inpatient consultation of pericardial effusion. He was admitted on 2/18/2023.  Previous [facility] and [facility] records have been reviewed in detail.      87 yr old male with a PMH of atrial fibrillation with long term anticoagulation, TIA, HTN, HLD, sinus node dysfunction with PPM, CAD with remote stenting, intermittent claudication, chronic shortness of breath, BPH, Polycythemia, traumatic pneumothorax s/p fall 4/2022, recent diagnosis of mesothelioma that was transferred from [facility] for possible thoracotomy/VATS decortication with Dr. Dewitt for large loculated pleural effusion. Chest x-ray on admission showed near complete whiteout of the left hemithorax likely combination of partial collapse of the left lung and large pleural  effusion, cardiomegaly. Abnormal labs today:  Bun/creat 27/1.37, GFR 80.3, Hgb/hct 7.3/22.7, INR 1.4.     He is currently resting in bed with his wife at bedside.  He states other than being fatigued and weak he feels okay.  He denies any chest pain, chest pressure, chest tightness lightheadedness or dizziness.  He does admit to chronic shortness of breath that he feels is stable.      Cardiac History/Testin2022 PPM: [employer] [place] XT DR MRI [id number]  5/3/2022 FUAD:  LVEF 55-60%, No atrial thrombus noted   3/8/2022 ECHO:  Normal LVEF, 1 + AR,   5/3/2022 Cardioversion    NO Medication List 2023  Medication Name Instruction   Aspirin 81 MG TABS TAKE 1 TAB DAILY   Doxepin HCl - 3 MG Oral Tablet TAKE 1 TABLET BY MOUTH AT BEDTIME AS NEEDED (INSOMNIA).   Fish Oil 1200 MG Oral Capsule take 2 caps daily   Furosemide 20 MG Oral Tablet TAKE 1 TABLET DAILY.   Losartan Potassium 100 MG Oral Tablet TAKE 1 TABLET BY MOUTH EVERY DAY   Meloxicam 15 MG Oral Tablet TAKE 1 TABLET DAILY AS NEEDED.   Metoprolol Tartrate 50 MG Oral Tablet TAKE 1/2 TABLET ONCE A DAY.   Nitroglycerin 0.4 MG Sublingual Tablet Sublingual USE AS NEEDED FOR CHEST PAIN   Potassium Chloride Sally ER 20 MEQ Oral Tablet Extended Release TAKE 1 TABLET DAILY.   Rosuvastatin Calcium 10 MG Oral Tablet TAKE 1 TABLET BY MOUTH EVERY DAY   Vitamin D (Ergocalciferol) 1.25 MG (40008 UT) Oral Capsule TAKE 1 CAPSULE WEEKLY.   Warfarin Sodium 4 MG Oral Tablet TAKE AS DIRECTED Per [facility]         Allergies:  Allergies   Allergen Reactions    Benadryl [Diphenhydramine Hcl] Anxiety    Diphenhydramine Anxiety     anxious       Past Medical History:  Past Medical History:   Diagnosis Date    A-fib (HCC)     approx 1 year ago-cardioverted    Arthritis     Baker's cyst of knee, left     Cancer (HCC)     mesothelioma - radiation treatments    Cerebral artery occlusion with cerebral infarction (HCC)     TIA sx felt funny --      Congestive heart failure ([facility] [address] 2022    Coronary artery disease     HTN (hypertension)     meds > 20 yrs    Hx of blood clots     DVT left leg     Hyperlipidemia     meds > 20 yrs    Pacemaker 2022    Polycythemia     Torn meniscus     left knee       Past Surgical History:  Past Surgical History:   Procedure Laterality Date    BACK SURGERY      COLONOSCOPY      COLONOSCOPY      CORONARY ANGIOPLASTY WITH STENT PLACEMENT  10/2006    x 1 cardiac stent    ENDOSCOPY, COLON, DIAGNOSTIC      EYE SURGERY      [place] with IOL OU    HERNIA REPAIR  2013    redo ca mesh in [id number].[id number] -- umbilical hernia    JOINT REPLACEMENT Bilateral  &     Bilateral TKR    KNEE SURGERY Left      arthroscopic surgery to repair meniscus of left knee    LAMINECTOMY N/A 2021    RIGHT BILATERAL L2-3 3-4 4-5 MICRODECOMPRESSION performed by Adela Heck MD at [facility] 22    PAIN MANAGEMENT PROCEDURE N/A 2023    ATTEMPTED SPINAL CORD STIMULATOR PERMANENT PLACEMENT performed by Jenelle Groves MD at [address] N/A 1/10/2023    SPINAL CORD STIMULATOR TRIAL performed by Jenelle Groves MD at [place]  age 8    UMBILICAL HERNIA REPAIR  2012    UPPER GASTROINTESTINAL ENDOSCOPY N/A 2023    EGD DIAGNOSTIC ONLY performed by Yadira Galo MD at [facility]       Family History:       Problem Relation Age of Onset    Heart Attack Mother     Other Mother          in MVA    Other Father          at age 80    Other Sister          as infant    Cancer Brother     Other Brother         unknown medical hx    Other Brother          at age 61 due to accident    Cancer Daughter         colon & lung cancer    Colon Cancer Daughter     No Known Problems Son     Colon Cancer Other     Breast Cancer Other        Social  History:     Social History     Tobacco Use    Smoking status: Former     Packs/day: 0.50     Years: 10.00     Pack years: 5.00     Types: Cigarettes     Quit date: 1968     Years since quittin.8    Smokeless tobacco: Never   Substance Use Topics    Alcohol use: Yes     Comment: social       Home Medications:    Prior to Admission medications    Medication Sig Start Date End Date Taking? Authorizing Provider   carboxymethylcellulose (THERATEARS) 1 % ophthalmic gel Apply to eye    Historical Provider, MD   melatonin 5 MG TABS tablet Take by mouth    Historical Provider, MD   enoxaparin (LOVENOX) 100 MG/ML Inject into the skin 2 times daily    Historical Provider, MD   metoprolol tartrate (LOPRESSOR) 50 MG tablet Take 25 mg by mouth 2 times daily    Historical Provider, MD   Elastic Bandages & Supports ([facility]) [address] 1 each by Does not apply route daily Thigh high 20-30 mmhg graduated compression stockings both legs wear daily during day and off Qhs. Wear as tolerated. Do not wear if they cause increased pain. 22   John Macias, APRN - CNP   losartan (COZAAR) 100 MG tablet Take 100 mg by mouth in the morning. Historical Provider, MD   meloxicam (MOBIC) 15 MG tablet Take 15 mg by mouth in the morning. Patient not taking: Reported on 2023    Historical Provider, MD   potassium chloride (KLOR-CON M) 20 MEQ extended release tablet Take 20 mEq by mouth in the morning and 20 mEq before bedtime. Historical Provider, MD   rosuvastatin (CRESTOR) 10 MG tablet TAKE 1 TABLET BY MOUTH EVERY DAY 22   Historical Provider, MD   furosemide (LASIX) 20 MG tablet Take 20 mg by mouth daily    Historical Provider, MD   warfarin (COUMADIN) 2.5 MG tablet Take 4 mg by mouth daily Indications: T-Th-Sat - Sun Daily per INR levels    Historical Provider, MD   nitroGLYCERIN (NITROSTAT) 0.4 MG SL tablet Place 0.4 mg under the tongue every 5 minutes as needed for Chest pain up to max of 3 total doses. If no relief after 1 dose, call 911.     Historical Provider, MD   Omega-3 Fatty Acids (FISH OIL) 1200 MG CAPS Take by mouth daily    Historical Provider, MD   Cholecalciferol (VITAMIN D3) 125 MCG (5000 UT) TABS Take by mouth daily    Historical Provider, MD   CPAP Machine MISC by Does not apply route    Historical Provider, MD   warfarin (COUMADIN) 5 MG tablet Take 5 mg by mouth daily Indications: Wavsvv-Zdtuxncjk-Lyrslc Daily per INR levels 2/26/13   Historical Provider, MD   aspirin 81 MG EC tablet Take 81 mg by mouth daily.       Historical Provider, MD       Current Medications:      IV Medications:  Current Facility-Administered Medications   Medication Dose Route Frequency Provider Last Rate Last Admin    vancomycin (VANCOCIN) 1,250 mg in sodium chloride 0.9 % 250 mL IVPB (ADDAVIAL)  1,250 mg IntraVENous Q24H Boby Gibbons Sedar, DO   Stopped at 02/22/23 2325    warfarin (COUMADIN) tablet 4 mg  4 mg Oral Once per day on Sun Tue Thu Sat Cecilio Ramirez MD        warfarin (COUMADIN) tablet 5 mg  5 mg Oral Once per day on Mon Wed Fri Cecilio Ramirez MD   5 mg at 02/22/23 1829    enoxaparin (LOVENOX) injection 120 mg  1 mg/kg SubCUTAneous BID Cecilio Ramirez MD   120 mg at 02/22/23 2029    acetaminophen (TYLENOL) tablet 650 mg  650 mg Oral Q4H PRN Tabitha Argueta MD   650 mg at 02/22/23 1545    metoprolol tartrate (LOPRESSOR) tablet 25 mg  25 mg Oral BID Boby Gibbons Sedar, DO   25 mg at 02/22/23 2029    rosuvastatin (CRESTOR) tablet 10 mg  10 mg Oral Daily Boby Gibbons Sedar, DO   10 mg at 02/22/23 1107    [Held by provider] losartan (COZAAR) tablet 100 mg  100 mg Oral Daily Boby Gibbons Sedar, DO        [Held by provider] furosemide (LASIX) tablet 20 mg  20 mg Oral Daily Boby Gibbons Sedar, DO        [Held by provider] aspirin EC tablet 81 mg  81 mg Oral Daily Agustin Hernandezar, DO        pantoprazole (PROTONIX) 40 mg in sodium chloride (PF) 0.9 % 10 mL injection  40 mg IntraVENous Q12H Murali Fuentes MD   40 mg at 02/23/23 0331    ondansetron (ZOFRAN-ODT) disintegrating tablet 4 mg  4 mg Oral Q8H PRN Salvador Ozuna DO        Or    ondansetron (ZOFRAN) injection 4 mg  4 mg IntraVENous Q6H PRN Join Hal Sedar, DO        meropenem (MERREM) 1,000 mg in sodium chloride 0.9 % 100 mL IVPB (mini-bag)  1,000 mg IntraVENous Q8H Agustin DAVIS Sedar, DO   Stopped at 02/23/23 0135    melatonin disintegrating tablet 5 mg  5 mg Oral Nightly Haven Hal Sedar, DO   5 mg at 02/22/23 2028    guaiFENesin [facility]) extended release tablet 600 mg  600 mg Oral BID Haven Keteresa Sedar, DO   600 mg at 02/22/23 2028    ipratropium-albuterol (DUONEB) nebulizer solution 1 ampule  1 ampule Inhalation Q4H PRN Sarah Ndiaye DO        vancomycin (VANCOCIN) intermittent dosing (placeholder)   Other RX Placeholder Tammi DAVIS Sedar, DO        oxyCODONE (ROXICODONE) immediate release tablet 5 mg  5 mg Oral Q4H PRN Erik Chávez MD   5 mg at 02/22/23 2029    Or    oxyCODONE (ROXICODONE) immediate release tablet 10 mg  10 mg Oral Q4H PRN Erik Chávez MD   10 mg at 02/20/23 1313    naloxegol (MOVANTIK) tablet 25 mg  25 mg Oral QAM Erik Chávez MD   25 mg at 02/22/23 1107     Facility-Administered Medications Ordered in Other Encounters   Medication Dose Route Frequency Provider Last Rate Last Admin    sodium chloride flush 0.9 % injection 10 mL  10 mL IntraVENous PRN Naomi Moreno MD   10 mL at 07/10/19 1220       ROS:   12 point review of systems was obtained in detail and is negative other than that noted above or below. Review of Systems  Constitutional: No weight loss, fever, chills, weakness. Positive for  fatigue. HEENT: No visual loss, blurred vision, double vision or yellow sclerae. Skin: No rash or itching  Cardiovascular:  No chest pain, pressure or discomfort. No palpitations or edema. Respiratory:  Positive for chronic shortness of breath, denies cough or sputum. Gastrointestinal:  No anorexia, nausea, vomiting or diarrhea. No abdominal pain. No bloody or dark tarry stools.    Neurological:  No headache, dizziness, syncope, paralysis, ataxia, numbness or tingling in the extremities. No change in bowel or bladder control. Musculoskeletal:  No muscle, back pain, joint pain or stiffness. Hematologic: No anemia, bleeding or bruising. Vital Signs:  Vitals:    02/22/23 1902 02/22/23 2017 02/23/23 0250 02/23/23 0715   BP: (!) 105/51 (!) 113/52 121/61 (!) 115/59   Pulse: 67 66 65 67   Resp:   16 14   Temp: 99.5 °F (37.5 °C)  97.9 °F (36.6 °C) 99.4 °F (37.4 °C)   TempSrc: Oral  Oral Oral   SpO2: 94% 94% 96% 96%   Weight:       Height:           Intake/Output Summary (Last 24 hours) at 2/23/2023 5844  Last data filed at 2/22/2023 1223  Gross per 24 hour   Intake --   Output 400 ml   Net -400 ml       Patient Vitals for the past 96 hrs (Last 3 readings):   Weight   02/21/23 0816 269 lb (122 kg)       Physical Examination:  Constitutional:  awake/alert/oriented x3, no distress, alert and cooperative. Morbidly obese. Eyes:  PERRL, sclera clear, conjunctiva pink. EMMT:  mucous membranes  moist, no apparent injury, no lesion seen. Head/Neck:  Neck supple, no apparent injury, thyroid without mass or tenderness, No JVD, trachea midline, no bruits. Respiratory/Thorax: Diminished throughout  Cardiovascular: Distant heart sounds,  regular, rate and rhythm, no murmurs,  normal S1 and S2, PMI non displaced. Gastrointestinal:   distended, firm, non-tender, no rebound tenderness or guarding, Morbidly obese. Genitourinary:  deferred  Musculoskeletal:  No apparent injury. Extremities:  No cyanosis, bilateral lower extremity edema, contusions or wounds, no clubbing. DP 2+ equal bilaterally. Radial 2+ equal bilaterally. Neurological:  Alert and oriented x3. Moves extremities spontaneous with purpose  Psychological:  Appropriate mood and behavior  Skin:  Warm and dry,  no lesions or rashes. Diagnostics:    EKG:  Pending    Telemetry:  No telemetry .     Lab Data:  BMP:  Recent Labs     02/21/23  0706 02/22/23  0619 02/23/23  0459    141 137   K 4.3 3.9 3.4   CL 104 108* 106   CO2 21 20 20   BUN 35* 31* 27*   CREATININE 1.51* 1.40* 1.37*   LABGLOM 44.8* 49.0* 50.3*       CBC:  Recent Labs     02/21/23  0706 02/22/23  0619 02/23/23  0459   WBC 12.1* 8.5 7.3   RBC 2.80* 2.55* 2.54*   HGB 8.2* 7.5* 7.3*   HCT 25.1* 22.9* 22.7*   MCV 89.5 89.8 89.6   MCH 29.3 29.6 28.9   MCHC 32.8* 32.9* 32.2*   RDW 15.3* 15.3* 15.7*    208 217       Cardiac Enzymes:   No results for input(s): CKTOTAL, CKMB, TROPONINI in the last 72 hours. Hepatic Function Panel:  No results for input(s): ALKPHOS, ALT, AST, PROT, BILITOT, BILIDIR, LABALBU in the last 72 hours. Magnesium:  Recent Labs     02/23/23  0459   MG 2.2       Pro-BNP:  No results found for: PROBNP    INR:  Recent Labs     02/22/23  1528 02/23/23  0459   PROTIME 16.6* 17.7*   INR 1.3 1.4       TSH:  No results found for: TSH    Lipid Profile:  No results found for: TRIG, HDL, LDLCALC, LDLDIRECT, LABVLDL    HgbA1C:  No results found for: LABA1C    Lactate Level:  No results for input(s): LACTA in the last 72 hours. CMP:  Recent Labs     02/21/23  0706 02/22/23  0619 02/23/23  0459    141 137   K 4.3 3.9 3.4    108* 106   CO2 21 20 20   BUN 35* 31* 27*   CREATININE 1.51* 1.40* 1.37*   GLUCOSE 118* 105* 109*   CALCIUM 8.8 8.6 8.2*       Amylase:  No results for input(s): AMYLASE in the last 72 hours. Lipase:  No results for input(s): LIPASE in the last 72 hours. ABG:  No results for input(s): PH, PO2, PCO2, HCO3, BE, O2SAT in the last 72 hours. Radiology:   CT CHEST WO CONTRAST   Final Result   Indwelling chest tube identified on the left with small left pleural effusion   and trace anterior pneumothorax. Consolidation seen in airspace disease   throughout the left upper and lower lobes to suggest a multifocal   superimposed pneumonia. Soft tissue density seen in the hilar region to   suggest possible reactive adenopathy. Moderate-sized pericardial effusion.    Minimal atelectatic changes seen at the right lung base. Incidental stones   in the gallbladder with small hiatal hernia. US DUP UPPER EXTREMITY RIGHT VENOUS   Final Result   No evidence of DVT. XR CHEST PORTABLE   Final Result   1. Minimal partial clearing of the left lung. The previously noted left   pleural effusion is smaller   2. Stable position of the left chest tube   3. The right lung is clear. XR CHEST PORTABLE   Final Result   1. Apparent interval placement of left chest tube catheter. No pneumothorax. 2.  Persistent opacification of the left hemithorax. XR CHEST PORTABLE   Final Result   1. Near complete whiteout of the left hemithorax likely represent a   combination of partial collapse of the left lung and large pleural effusion   2. Cardiomegaly   3. The right lung is grossly clear. XR ABDOMEN (KUB) (SINGLE AP VIEW)   Final Result   1. Complete opacification of left hemithorax compatible with some   combination of pleural effusion and atelectasis. Secondary obscuration of   left heart border. 2.  Nonobstructive bowel gas pattern. No acute abdominal disease identified. 3.  Probable left renal stone. Impression:   Principal Problem:    Pleural effusion  Active Problems:    Melena    Gastritis without bleeding  Resolved Problems:    * No resolved hospital problems.  *    Patient Active Problem List   Diagnosis    Ventral hernia    Ventral hernia, recurrent    Polycythemia    Spinal stenosis of lumbar region with neurogenic claudication    Atherosclerosis of native artery of both lower extremities with intermittent claudication (HCC)    Atrial fibrillation (HCC)    BPH with obstruction/lower urinary tract symptoms    Congenital cystic kidney disease    Venous insufficiency    Intermittent claudication ([address]    Transient ischemic attack    Sleep apnea    Rosacea    Fuchs' corneal dystrophy    Excessive daytime sleepiness    Hx of blood clots    Former smoker    Lumbar stenosis with neurogenic claudication    Congestive heart failure (HCC)    Lumbar spondylosis    Balance disorder    Postlaminectomy syndrome, lumbar region    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 Do not hesitate to call if you have any questions.     Electronically signed by EDELMIRA Bellamy CNP, [facility] on 2/23/2023 at 9:04 AM

## 2023-02-23 NOTE — PROGRESS NOTES
Physical Therapy Missed Treatment   Facility/Department: [facility] MED SURG [id number]    NAME: Yang Sanchez    : 1935 (82 y.o.)  MRN: [id number]    Account: [de-identified]  Gender: male      Pt requests delay in eval due to fatigue. Nursing staff notified. Will follow and attempt PT evaluation again at earliest availability.        [place], PT, 23 at 11:16 AM

## 2023-02-23 NOTE — PROGRESS NOTES
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    Mixed hyperlipidemia 2022    Congestive heart failure ([address] 2022    Lumbar spondylosis 2022    Balance disorder 2022    Lumbar stenosis with neurogenic claudication 2021    Atherosclerosis of native artery of both lower extremities with intermittent claudication ([address] 2021    Atrial fibrillation ([address] 2021    Venous insufficiency 2021    Sleep apnea 2021    Excessive daytime sleepiness 2021    Spinal stenosis of lumbar region with neurogenic claudication 2021    Transient ischemic attack 2019    Polycythemia 2018    Intermittent claudication ([address] 12/10/2018    Rosacea 2018    Fuchs' corneal dystrophy 2016    Ventral hernia, recurrent 2013    Ventral hernia 2012    Hx of blood clots     Former smoker     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        Past Medical History:   Diagnosis Date    A-fib ([address]     approx 1 year ago-cardioverted    Arthritis     Baker's cyst of knee, left     Cancer ([address]     mesothelioma  radiation treatments    Cerebral artery occlusion with cerebral infarction ([address]     TIA sx felt funny --     Congestive heart failure ([address] 03/08/2022    Coronary artery disease     HTN (hypertension)     meds > 20 yrs    Hx of blood clots 2012    DVT left leg     Hyperlipidemia     meds > 20 yrs    Pacemaker 07/19/2022    Polycythemia     Torn meniscus     left knee     Past Surgical History:   Procedure Laterality Date    BACK SURGERY      COLONOSCOPY  2009    COLONOSCOPY  2012    CORONARY ANGIOPLASTY WITH STENT PLACEMENT  10/2006    x 1 cardiac stent    ENDOSCOPY, COLON, DIAGNOSTIC      EYE SURGERY      [place] with IOL OU    HERNIA REPAIR  03/2013    redo ca mesh in [id number].[id number] -- umbilical hernia    JOINT REPLACEMENT Bilateral 2009 & 2011    Bilateral TKR    KNEE SURGERY Left      arthroscopic surgery to repair meniscus of left knee    LAMINECTOMY N/A 02/08/2021    RIGHT BILATERAL L2-3 3-4 4-5 MICRODECOMPRESSION performed by Robert Escalante MD at [facility] 7/19/22    PAIN MANAGEMENT PROCEDURE N/A 2/16/2023    ATTEMPTED SPINAL CORD STIMULATOR PERMANENT PLACEMENT performed by Camryn Rizo MD at [address] N/A 1/10/2023    SPINAL CORD STIMULATOR TRIAL performed by Camryn Rizo MD at [facility]  age 8    [address]  03/2012    UPPER GASTROINTESTINAL ENDOSCOPY N/A 2/21/2023    EGD DIAGNOSTIC ONLY performed by Criss Vick MD at [facility]        Restrictions  Restrictions/Precautions: Fall Risk              Safety Devices: Safety Devices  Type of Devices: All fall risk precautions in place;Call light within reach; Left in bed     Patient's date of birth confirmed: Yes    General:       Subjective:Pt pleasant and cooperative. Pt seen with spouse and daughter present.           Pain at start of treatment: No    Pain at end of treatment: No    Location:   Description   Nursing notified: Not Applicable  RN:   Intervention: Repositioned    Prior Level of Function:  Social/Functional History  Lives With: Spouse  Type of Home: House  Home Layout: One level (basement)  Home Access: Stairs to enter without rails (hoolds on to door jamb)  Entrance Stairs - Number of Steps: 2  Bathroom Shower/Tub: Walk-in shower  Home Equipment: [place] Jansky, rolling, Rollator, Cane (uses rollator outside)  Has the patient had two or more falls in the past year or any fall with injury in the past year?: Yes (broken rib and puncture lung)  ADL Assistance: Independent  Homemaking Assistance: Needs assistance (staniding tolerance is limited)  Ambulation Assistance: Independent (ww)  Transfer Assistance: Independent  Active : No  Occupation: Retired  Type of Occupation: construction work  Additional Comments: right handed    OBJECTIVE:     Orientation Status:  Orientation  Overall Orientation Status: Within Functional Limits  Orientation Level: Oriented X4    Observation:  Observation/Palpation  Posture: Fair  Observation: mild flexion. SOB with minimal exertion    Cognition Status:  Cognition  Cognition Comment: follows commands consistently    Perception Status:  Perception  Overall Perceptual Status: WFL    Vision and Hearing Status:  Vision  Vision Exceptions: Wears glasses at all times  Hearing  Hearing: Exceptions to [facility]  Hearing Exceptions: Hard of hearing/hearing concerns;Bilateral hearing aid   Vision - Basic Assessment  Prior Vision: Wears glasses all the time  Visual History: No significant visual history  Patient Visual Report: No visual complaint reported. Visual Field Cut: No  Oculo Motor Control: WNL    GROSS ASSESSMENT AROM/PROM:  AROM: Within functional limits       ROM:   LUE AROM (degrees)  LUE AROM : WFL  Left Hand AROM (degrees)  Left Hand AROM: WFL  RUE AROM (degrees)  RUE AROM : WFL  Right Hand AROM (degrees)  Right Hand AROM: WFL    UE STRENGTH:  Strength:  Within functional limits (4-4+/5 right, 4/5 left)    UE COORDINATION:  Coordination: Within functional limits    UE TONE:  Tone: Normal    UE SENSATION:  Sensation: Intact    Hand Dominance:  Hand Dominance  Hand Dominance: Right    ADL Status:  ADL  Feeding: Unable to assess(comment)  Grooming: Stand by assistance; Increased time to complete  UE Bathing: Minimal assistance; Increased time to complete  LE Bathing: Moderate assistance; Increased time to complete  UE Dressing: Minimal assistance; Increased time to complete  LE Dressing: Maximum assistance; Increased time to complete  Toileting: Maximum assistance; Increased time to complete  Toilet Transfers  Toilet - Technique: Ambulating  Equipment Used: Grab bars  Toilet Transfer: Minimal assistance    Functional Mobility:    Transfers  Sit to stand: Supervision  Stand to sit: Minimal assistance    Patient ambulated to/from bathroom with [address] at [facility] level.      Bed Mobility  Bed mobility  Rolling to Left: Stand by assistance  Rolling to Right: Stand by assistance  Supine to Sit: Moderate assistance (assist for trunk required)  Bed Mobility Comments: HOB flat  - intermittent use of rails required    Seated and Standing Balance:  Balance  Sitting: Intact  Standing:  (CGA)    Functional Endurance:  Activity Tolerance  Activity Tolerance: Patient limited by fatigue;Treatment limited secondary to medical complications (free text)    D/C Recommendations:  OT D/C RECOMMENDATIONS  REQUIRES OT FOLLOW-UP: Yes    Equipment Recommendations:       OT Education:        OT Follow Up:   OT D/C RECOMMENDATIONS  REQUIRES OT FOLLOW-UP: Yes       Assessment/Discharge Disposition:     Performance deficits / Impairments: Decreased functional mobility , Decreased ADL status, Decreased endurance, Decreased strength, Decreased balance, Decreased posture, Decreased high-level IADLs  Prognosis: Good  Discharge Recommendations: Continue to assess pending progress  Decision Making: Medium Complexity  History: multiple  Exam: 7 deficits  Assistance / Modification: Mod/Max A    AMPAC (Six Click) Self care Score   How much help is needed for putting on and taking off regular lower body clothing?: A Lot  How much help is needed for bathing (which includes washing, rinsing, drying)?: A Lot  How much help is needed for toileting (which includes using toilet, bedpan, or urinal)?: A Lot  How much help is needed for putting on and taking off regular upper body clothing?: A Little  How much help is needed for taking care of personal grooming?: None  How much help for eating meals?: None  AM-[facility] Inpatient Daily Activity Raw Score: 17  AM-PAC Inpatient ADL T-Scale Score : 37.26  ADL Inpatient CMS 0-100% Score: 50.11    Therapy key for assistance levels -   Independent/Mod I = Pt. is able to perform task with no assistance but may require a device   Stand by assistance = Pt. does not perform task at an independent level but does not need physical assistance, requires verbal cues  Minimal, Moderate, Maximal Assistance = Pt. requires physical assistance (25%, 50%, 75% assist from helper) for task but is able to actively participate in task   Dependent = Pt. requires total assistance with task and is not able to actively participate with task completion     Plan:  Occupational Therapy Plan  Times Per Week: 1-4x/wk  Current Treatment Recommendations: Strengthening, Balance training, Functional mobility training, Endurance training, Neuromuscular re-education, Self-Care / ADL    Goals:   Patient will:    - Improve functional endurance to tolerate/complete 12-20 mins of ADL's  - Be independent in UB ADLs   - Be Min A in LB ADLs  - Be Supervision in ADL transfers without LOB  - Be Supervision in toileting tasks  - Improve bilateral UE strength and endurance to Fair+ in order to participate in self-care activities as projected. Patient Goal: Patient goals :  To return to home with spouse when ready     Discussed and agreed upon: Yes Comments:       Therapy Time:   Individual   Time In 1326   Time Out 1345   Minutes 19          Eval: 19 minutes     Electronically signed by:    Juliann Boas, OTR/L,   8/65/1624, 1:56 PM Electronically signed by NAIN Lowery on 8/63/22 at 2:00 PM EST

## 2023-02-24 LAB
ANION GAP SERPL CALCULATED.3IONS-SCNC: 9 MEQ/L (ref 9–15)
BASOPHILS ABSOLUTE: 0 K/UL (ref 0–0.2)
BASOPHILS RELATIVE PERCENT: 0.5 %
BUN BLDV-MCNC: 24 MG/DL (ref 8–23)
CALCIUM SERPL-MCNC: 7.9 MG/DL (ref 8.5–9.9)
CHLORIDE BLD-SCNC: 108 MEQ/L (ref 95–107)
CO2: 23 MEQ/L (ref 20–31)
CREAT SERPL-MCNC: 1.35 MG/DL (ref 0.7–1.2)
EOSINOPHILS ABSOLUTE: 0.3 K/UL (ref 0–0.7)
EOSINOPHILS RELATIVE PERCENT: 4.6 %
GFR SERPL CREATININE-BSD FRML MDRD: 51.2 ML/MIN/{1.73_M2}
GLUCOSE BLD-MCNC: 110 MG/DL (ref 70–99)
HCT VFR BLD CALC: 20.3 % (ref 42–52)
HCT VFR BLD CALC: 20.9 % (ref 42–52)
HCT VFR BLD CALC: 21.7 % (ref 42–52)
HCT VFR BLD CALC: 22.6 % (ref 42–52)
HEMOGLOBIN: 7 G/DL (ref 14–18)
HEMOGLOBIN: 7.2 G/DL (ref 14–18)
HEMOGLOBIN: 7.2 G/DL (ref 14–18)
HEMOGLOBIN: 7.4 G/DL (ref 14–18)
INR BLD: 1.6
LV EF: 58 %
LVEF MODALITY: NORMAL
LYMPHOCYTES ABSOLUTE: 0.6 K/UL (ref 1–4.8)
LYMPHOCYTES RELATIVE PERCENT: 8.7 %
MAGNESIUM: 2.1 MG/DL (ref 1.7–2.4)
MCH RBC QN AUTO: 30.5 PG (ref 27–31.3)
MCHC RBC AUTO-ENTMCNC: 34.4 % (ref 33–37)
MCV RBC AUTO: 88.7 FL (ref 79–92.2)
MONOCYTES ABSOLUTE: 0.9 K/UL (ref 0.2–0.8)
MONOCYTES RELATIVE PERCENT: 12.4 %
NEUTROPHILS ABSOLUTE: 5.2 K/UL (ref 1.4–6.5)
NEUTROPHILS RELATIVE PERCENT: 73.8 %
PDW BLD-RTO: 15.7 % (ref 11.5–14.5)
PLATELET # BLD: 200 K/UL (ref 130–400)
POTASSIUM REFLEX MAGNESIUM: 3.5 MEQ/L (ref 3.4–4.9)
PROTHROMBIN TIME: 19.3 SEC (ref 12.3–14.9)
RBC # BLD: 2.29 M/UL (ref 4.7–6.1)
SODIUM BLD-SCNC: 140 MEQ/L (ref 135–144)
VANCOMYCIN RANDOM: 15.7 UG/ML (ref 10–40)
WBC # BLD: 7 K/UL (ref 4.8–10.8)

## 2023-02-24 PROCEDURE — 6370000000 HC RX 637 (ALT 250 FOR IP): Performed by: INTERNAL MEDICINE

## 2023-02-24 PROCEDURE — C9113 INJ PANTOPRAZOLE SODIUM, VIA: HCPCS | Performed by: INTERNAL MEDICINE

## 2023-02-24 PROCEDURE — 6360000002 HC RX W HCPCS: Performed by: INTERNAL MEDICINE

## 2023-02-24 PROCEDURE — 6360000002 HC RX W HCPCS: Performed by: FAMILY MEDICINE

## 2023-02-24 PROCEDURE — 99232 SBSQ HOSP IP/OBS MODERATE 35: CPT | Performed by: INTERNAL MEDICINE

## 2023-02-24 PROCEDURE — 6370000000 HC RX 637 (ALT 250 FOR IP): Performed by: FAMILY MEDICINE

## 2023-02-24 PROCEDURE — 2580000003 HC RX 258: Performed by: INTERNAL MEDICINE

## 2023-02-24 PROCEDURE — 85018 HEMOGLOBIN: CPT

## 2023-02-24 PROCEDURE — 99222 1ST HOSP IP/OBS MODERATE 55: CPT | Performed by: PHYSICAL MEDICINE & REHABILITATION

## 2023-02-24 PROCEDURE — 80048 BASIC METABOLIC PNL TOTAL CA: CPT

## 2023-02-24 PROCEDURE — 80202 ASSAY OF VANCOMYCIN: CPT

## 2023-02-24 PROCEDURE — 93306 TTE W/DOPPLER COMPLETE: CPT

## 2023-02-24 PROCEDURE — 85610 PROTHROMBIN TIME: CPT

## 2023-02-24 PROCEDURE — 83735 ASSAY OF MAGNESIUM: CPT

## 2023-02-24 PROCEDURE — A4216 STERILE WATER/SALINE, 10 ML: HCPCS | Performed by: INTERNAL MEDICINE

## 2023-02-24 PROCEDURE — 1210000000 HC MED SURG R&B

## 2023-02-24 PROCEDURE — 85014 HEMATOCRIT: CPT

## 2023-02-24 PROCEDURE — 36415 COLL VENOUS BLD VENIPUNCTURE: CPT

## 2023-02-24 PROCEDURE — 2700000000 HC OXYGEN THERAPY PER DAY

## 2023-02-24 PROCEDURE — 85025 COMPLETE CBC W/AUTO DIFF WBC: CPT

## 2023-02-24 RX ADMIN — SODIUM CHLORIDE, PRESERVATIVE FREE 40 MG: 5 INJECTION INTRAVENOUS at 15:38

## 2023-02-24 RX ADMIN — METOPROLOL TARTRATE 25 MG: 25 TABLET, FILM COATED ORAL at 09:37

## 2023-02-24 RX ADMIN — MEROPENEM 1000 MG: 1 INJECTION, POWDER, FOR SOLUTION INTRAVENOUS at 09:37

## 2023-02-24 RX ADMIN — SODIUM CHLORIDE, PRESERVATIVE FREE 40 MG: 5 INJECTION INTRAVENOUS at 03:16

## 2023-02-24 RX ADMIN — METOPROLOL TARTRATE 25 MG: 25 TABLET, FILM COATED ORAL at 20:46

## 2023-02-24 RX ADMIN — WARFARIN SODIUM 5 MG: 5 TABLET ORAL at 18:18

## 2023-02-24 RX ADMIN — GUAIFENESIN 600 MG: 600 TABLET ORAL at 20:46

## 2023-02-24 RX ADMIN — ENOXAPARIN SODIUM 120 MG: 150 INJECTION SUBCUTANEOUS at 20:46

## 2023-02-24 RX ADMIN — Medication 5 MG: at 20:46

## 2023-02-24 RX ADMIN — ASPIRIN 81 MG: 81 TABLET, COATED ORAL at 09:46

## 2023-02-24 RX ADMIN — GUAIFENESIN 600 MG: 600 TABLET ORAL at 09:46

## 2023-02-24 RX ADMIN — FUROSEMIDE 20 MG: 20 TABLET ORAL at 09:37

## 2023-02-24 RX ADMIN — MEROPENEM 1000 MG: 1 INJECTION, POWDER, FOR SOLUTION INTRAVENOUS at 18:18

## 2023-02-24 RX ADMIN — ACETAMINOPHEN 650 MG: 325 TABLET ORAL at 23:56

## 2023-02-24 RX ADMIN — NALOXEGOL OXALATE 25 MG: 12.5 TABLET, FILM COATED ORAL at 09:46

## 2023-02-24 RX ADMIN — ROSUVASTATIN CALCIUM 10 MG: 10 TABLET, FILM COATED ORAL at 09:46

## 2023-02-24 RX ADMIN — ENOXAPARIN SODIUM 120 MG: 150 INJECTION SUBCUTANEOUS at 09:54

## 2023-02-24 RX ADMIN — MEROPENEM 1000 MG: 1 INJECTION, POWDER, FOR SOLUTION INTRAVENOUS at 23:58

## 2023-02-24 ASSESSMENT — PAIN DESCRIPTION - LOCATION: LOCATION: ARM

## 2023-02-24 ASSESSMENT — PAIN SCALES - GENERAL
PAINLEVEL_OUTOF10: 0
PAINLEVEL_OUTOF10: 7

## 2023-02-24 ASSESSMENT — PAIN DESCRIPTION - DESCRIPTORS: DESCRIPTORS: ACHING

## 2023-02-24 ASSESSMENT — PAIN DESCRIPTION - ORIENTATION: ORIENTATION: LEFT

## 2023-02-24 NOTE — CARE COORDINATION
PAS reviewed by PM&R patient accepted to acute inpatient rehab. He can be admitted to room 246 when cleared medically and pending a negative Covid screen test. He has Echo pending so would need cleared and cardiac work-up complete before admitting to acute inpatient rehab.   Electronically signed by Deya Yanez RN on 2/24/23 at 3:15 PM EST

## 2023-02-24 NOTE — CARE COORDINATION
Met with pt, spouse and Osiris Pryor from [facility] in room at bedside. Discuss discharge planning. Pt will need 10 days of IV ATB per ID on discharge. Pt and spouse would like [facility] for therapy and IV ATB. Rehab to evaluate.

## 2023-02-24 NOTE — PROGRESS NOTES
Shift assessment completed and medications given per MAR. Chest tube drained and 225 ml removed. VSS and alert and oriented. No pain or discomfort at this time. Call light within reach and bed in lowest position. Patient's family at bedside. Will continue to monitor.

## 2023-02-24 NOTE — PROGRESS NOTES
Hospitalist Progress Note      PCP: Giuseppe Fails, DO    Date of Admission: 2/18/2023    Chief Complaint:    No chief complaint on file. Subjective:  Patient denies fevers, chills, sweats, CP, SOB, diarrhea or burning micturition. Sitting up in bed. Feels improed. 12 point ROS negative other than mentioned above     Medications:  Reviewed    Infusion Medications   Scheduled Medications    warfarin  4 mg Oral Once per day on Sun Tue Thu Sat    warfarin  5 mg Oral Once per day on Mon Wed Fri    enoxaparin  1 mg/kg SubCUTAneous BID    metoprolol tartrate  25 mg Oral BID    rosuvastatin  10 mg Oral Daily    [Held by provider] losartan  100 mg Oral Daily    furosemide  20 mg Oral Daily    aspirin  81 mg Oral Daily    pantoprazole (PROTONIX) 40 mg injection  40 mg IntraVENous Q12H    meropenem  1,000 mg IntraVENous Q8H    melatonin  5 mg Oral Nightly    guaiFENesin  600 mg Oral BID    naloxegol  25 mg Oral QAM     PRN Meds: acetaminophen, ondansetron **OR** ondansetron, ipratropium-albuterol, oxyCODONE **OR** oxyCODONE    No intake or output data in the 24 hours ending 02/24/23 1525      Exam:    BP (!) 103/53   Pulse 64   Temp 98.2 °F (36.8 °C) (Oral)   Resp 18   Ht 5' 7\" (1.702 m)   Wt 269 lb (122 kg)   SpO2 94%   BMI 42.13 kg/m²     General appearance: No apparent distress, appears stated age and cooperative. HEENT:  Conjunctivae/corneas clear. Neck: Trachea midline. Respiratory:  Normal respiratory effort. Clear to auscultation  Cardiovascular: Regular rate and rhythm  Abdomen: Soft, non-tender, non-distended with normal bowel sounds.   Musculoskeletal: No clubbing, cyanosis or edema bilaterally  Neuro: Non Focal.   Capillary Refill: Brisk,< 3 seconds   Peripheral Pulses: +2 palpable, equal bilaterally     Labs:   Recent Labs     02/22/23  0619 02/23/23  0459 02/24/23  0432 02/24/23  1141   WBC 8.5 7.3 7.0  --    HGB 7.5* 7.3* 7.0* 7.2*   HCT 22.9* 22.7* 20.3* 21.7*    217 200  --      Recent Labs     02/22/23  0619 02/23/23  0459 02/24/23  0431    137 140   K 3.9 3.4 3.5   * 106 108*   CO2 20 20 23   BUN 31* 27* 24*   CREATININE 1.40* 1.37* 1.35*   CALCIUM 8.6 8.2* 7.9*     No results for input(s): AST, ALT, BILIDIR, BILITOT, ALKPHOS in the last 72 hours. Recent Labs     02/22/23  1528 02/23/23  0459 02/24/23  0431   INR 1.3 1.4 1.6       No results for input(s): Seania Sharri in the last 72 hours. Urinalysis:      Lab Results   Component Value Date/Time    NITRU Negative 02/19/2023 03:13 PM    WBCUA 3-5 02/19/2023 03:13 PM    BACTERIA Negative 02/19/2023 03:13 PM    RBCUA 6-10 02/19/2023 03:13 PM    BLOODU TRACE 02/19/2023 03:13 PM    SPECGRAV 1.028 02/19/2023 03:13 PM    GLUCOSEU Negative 02/19/2023 03:13 PM       Radiology:  CT CHEST WO CONTRAST   Final Result   Indwelling chest tube identified on the left with small left pleural effusion   and trace anterior pneumothorax. Consolidation seen in airspace disease   throughout the left upper and lower lobes to suggest a multifocal   superimposed pneumonia. Soft tissue density seen in the hilar region to   suggest possible reactive adenopathy. Moderate-sized pericardial effusion. Minimal atelectatic changes seen at the right lung base. Incidental stones   in the gallbladder with small hiatal hernia. US DUP UPPER EXTREMITY RIGHT VENOUS   Final Result   No evidence of DVT. XR CHEST PORTABLE   Final Result   1. Minimal partial clearing of the left lung. The previously noted left   pleural effusion is smaller   2. Stable position of the left chest tube   3. The right lung is clear. XR CHEST PORTABLE   Final Result   1. Apparent interval placement of left chest tube catheter. No pneumothorax. 2.  Persistent opacification of the left hemithorax. XR CHEST PORTABLE   Final Result   1.  Near complete whiteout of the left hemithorax likely represent a   combination of partial collapse of the left lung and large pleural effusion   2. Cardiomegaly   3. The right lung is grossly clear. XR ABDOMEN (KUB) (SINGLE AP VIEW)   Final Result   1. Complete opacification of left hemithorax compatible with some   combination of pleural effusion and atelectasis. Secondary obscuration of   left heart border. 2.  Nonobstructive bowel gas pattern. No acute abdominal disease identified. 3.  Probable left renal stone. Assessment/Plan:    Loculated pleural effusion: Chest tube in place; possibly related to mesothelioma; pain regimen is doing well now  2/22 -initially patient prepared for discharge today however fluid culture noted positive today. Patient was febrile x1. Discussed with pulmonology that patient will likely require IV antibiotics for extended period. Infectious disease consultation. CT chest ordered. Will likely need PICC line and possible placement. Initial DC plan held  2/23 - await id recs appreciate cts and pulm input. 2/24 -we will reculture from Pleurx cath tomorrow. Patient is currently off of vancomycin and continuing meropenem. Discussed at length with infectious disease. We will continue for 10 days of meropenem. Plan to be altered depending on culture results. Currently pending rehab evaluation and approval.  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 Monitor on clears overnight, probable dc home tomorrow. atrial fibrillation: Hold warfarin for now. Continue metoprolol for rate control. Chronic pain with spinal stimulator: Consult to pain management   Sick sinus syndrome status post pacemaker: Continue beta-blocker  SCOTTY escalate to BiPAP before surgery.   5 L bleed in O2  Polycythemia: Follow CBC. Stat CBC now        Active Hospital Problems    Diagnosis Date Noted    Impaired mobility and activities of daily living [Z74.09, Z78.9] 02/23/2023     Priority: Medium    Gastritis without bleeding [K29.70] 02/21/2023     Priority: Medium    Melena [K92.1] 02/20/2023     Priority: Medium    Pleural effusion [J90] 02/18/2023     Priority: Medium    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[address] [C45.9] 01/03/2023     Priority: Medium    Congestive heart failure ([address] [I50.9] 03/08/2022    Lumbar stenosis with neurogenic claudication [M48.062] 02/08/2021    Atrial fibrillation [facility] [I48.91] 02/02/2021        Additional work up or/and treatment plan may be added today or then after based on clinical progression. I am managing a portion of pt care. Some medical issues are handled by other specialists. Additional work up and treatment should be done in out pt setting by pt PCP and other out pt providers. In addition to examining and evaluating pt, I spent additional time explaining care, normal and abnormal findings, and treatment plan. All of pt questions were answered. Counseling, diet and education were  provided. Case will be discussed with nursing staff when appropriate. Family will be updated if and when appropriate. Diet: ADULT DIET; Regular;  Low Sodium (2 gm)    Code Status: Full Code    PT/OT Eval     Electronically signed by Sachi Ospina MD on 2/24/2023 at 3:25 PM

## 2023-02-24 NOTE — CARE COORDINATION
This LSW met with patient and wife at bedside this am. Discharge plans discussed and patient plans to be transferred to [facility] upon discharge. IMM completed. LSW / CM to follow.   Electronically signed by ANALY Neumann, DAVID on 2/24/23 at 10:37 AM EST

## 2023-02-24 NOTE — PROGRESS NOTES
INPATIENT PROGRESS NOTES    PATIENT NAME: Dyllan Fenton  MRN: [id number]  SERVICE DATE:  February 24, 2023   SERVICE TIME:  1:42 PM      PRIMARY SERVICE: Pulmonary Disease    CHIEF COMPLAIN: Shortness of breath      INTERVAL HPI: Patient seen and examined at bedside, Interval Notes, orders reviewed. Nursing notes noted   Family at bedside. He is comfortable in bed. He does get short of breath with any exertion. No chest pain. No fever or chills. No nausea vomiting diarrhea abdominal pain. .    OBJECTIVE    Body mass index is 42.13 kg/m². PHYSICAL EXAM:  Vitals:  /60   Pulse 75   Temp 99.9 °F (37.7 °C) (Oral)   Resp 16   Ht 5' 7\" (1.702 m)   Wt 269 lb (122 kg)   SpO2 100%   BMI 42.13 kg/m²   General: Obese alert, awake . comfortable in bed, No distress. Head: Atraumatic , Normocephalic   Eyes: PERRL. No sclera icterus. No conjunctival injection. No discharge   ENT: No nasal  discharge. Pharynx clear. Neck:  Trachea midline. No thyromegaly, no JVD, No cervical adenopathy. Chest : Bilaterally symmetrical ,Normal effort,  No accessory muscle use  Lung : Diminished breath sound at left base no Rales. No wheezing. No rhonchi. Heart[de-identified] Normal  rate. Regular rhythm. No mumur ,  Rub or gallop  ABD: Non-tender. Non-distended. No masses. No organmegaly. Normal bowel sounds. No hernia. Ext : Trace pitting both leg , No Cyanosis No clubbing  Neuro: no focal weakness          DATA:   Recent Labs     02/23/23 0459 02/24/23  0432 02/24/23  1141   WBC 7.3 7.0  --    HGB 7.3* 7.0* 7.2*   HCT 22.7* 20.3* 21.7*   MCV 89.6 88.7  --     200  --      Recent Labs     02/23/23 0459 02/24/23  0431    140   K 3.4 3.5    108*   CO2 20 23   BUN 27* 24*   CREATININE 1.37* 1.35*   GLUCOSE 109* 110*   CALCIUM 8.2* 7.9*   LABGLOM 50.3* 51.2*       MV Settings:     FiO2 : 21 %    No results for input(s): PHART, TJC7IOO, PO2ART, GSE8UJF, BEART, M0TLYFBM in the last 72 hours.     O2 Device: Nasal cannula  O2 Flow Rate (L/min): 2 L/min    ADULT DIET; Regular; Low Sodium (2 gm)     MEDICATIONS during current hospitalization:    Continuous Infusions:      Scheduled Meds:   warfarin  4 mg Oral Once per day on Sun Tue Thu Sat    warfarin  5 mg Oral Once per day on Mon Wed Fri    enoxaparin  1 mg/kg SubCUTAneous BID    metoprolol tartrate  25 mg Oral BID    rosuvastatin  10 mg Oral Daily    [Held by provider] losartan  100 mg Oral Daily    furosemide  20 mg Oral Daily    aspirin  81 mg Oral Daily    pantoprazole (PROTONIX) 40 mg injection  40 mg IntraVENous Q12H    meropenem  1,000 mg IntraVENous Q8H    melatonin  5 mg Oral Nightly    guaiFENesin  600 mg Oral BID    naloxegol  25 mg Oral QAM       PRN Meds:acetaminophen, ondansetron **OR** ondansetron, ipratropium-albuterol, oxyCODONE **OR** oxyCODONE    Radiology  XR ABDOMEN (KUB) (SINGLE AP VIEW)    Result Date: 2/18/2023  EXAMINATION: FOUR SUPINE XRAY VIEW(S) OF THE ABDOMEN 2/18/2023 7:02 am COMPARISON: CHEST X-RAY 02/18/2023 HISTORY: ORDERING SYSTEM PROVIDED HISTORY: abd distentnion TECHNOLOGIST PROVIDED HISTORY: Reason for exam:->abd distentnion What reading provider will be dictating this exam?->CRC FINDINGS: Complete opacification of left hemithorax compatible with some combination of pleural effusion and atelectasis. Obscuration of the left heart border. Pericardial effusion not excluded. Dual chamber transvenous pacemaker in place. The lower pelvis was not imaged. Nonobstructive bowel gas pattern. The stomach, small bowel, and colon are nondilated. 9 mm calcification compatible with a stone at the midpole of the left kidney. Degenerative changes of the lumbar spine. 1.  Complete opacification of left hemithorax compatible with some combination of pleural effusion and atelectasis. Secondary obscuration of left heart border. 2.  Nonobstructive bowel gas pattern. No acute abdominal disease identified. 3.  Probable left renal stone.      XR CHEST PORTABLE    Result Date: 2/19/2023  EXAMINATION: ONE XRAY VIEW OF THE CHEST 2/19/2023 7:51 am COMPARISON: 02/18/2023 HISTORY: ORDERING SYSTEM PROVIDED HISTORY: pleural effusion TECHNOLOGIST PROVIDED HISTORY: Reason for exam:->pleural effusion What reading provider will be dictating this exam?->CRC FINDINGS: Note is made of a left chest tube. There is no left pneumothorax. There is been partial clearing of the previously noted left pleural effusion. Significant residual airspace disease as well as a residual pleural effusion is noted The right lung is clear. There is a dual lead cardiac pacer on the left. 1. Minimal partial clearing of the left lung. The previously noted left pleural effusion is smaller 2. Stable position of the left chest tube 3. The right lung is clear. XR CHEST PORTABLE    Result Date: 2/18/2023  EXAMINATION: ONE XRAY VIEW OF THE CHEST 2/18/2023 11:11 am COMPARISON: None. HISTORY: ORDERING SYSTEM PROVIDED HISTORY: chest tube placement TECHNOLOGIST PROVIDED HISTORY: Reason for exam:->chest tube placement What reading provider will be dictating this exam?->CRC FINDINGS: There is opacification of the left hemithorax. No evidence of pneumothorax. Air bronchograms are noted in the perihilar region. Since the prior study there appears to been placement of a left-sided chest tube with its tip near the left upper chest apex. Right-sided dual lead pacemaker is unchanged. Heart appears enlarged. Mild reticular opacities in the right lung are unchanged. Stable degenerative changes in the left shoulder and AC joint. 1.  Apparent interval placement of left chest tube catheter. No pneumothorax. 2.  Persistent opacification of the left hemithorax.      XR CHEST PORTABLE    Result Date: 2/18/2023  EXAMINATION: ONE XRAY VIEW OF THE CHEST 2/18/2023 7:02 am COMPARISON: There are no prior recent studies available for review HISTORY: ORDERING SYSTEM PROVIDED HISTORY: loculated pleural effusion TECHNOLOGIST PROVIDED HISTORY: Reason for exam:->loculated pleural effusion What reading provider will be dictating this exam?->CRC FINDINGS: The cardiac silhouette appears enlarged. Please note the left heart border is obscured due to the left lung pathology. There is near complete whiteout of the left hemithorax. The finding could represent a combination of partial collapse of the left lung and large pleural effusion. There is no right lung infiltrate or right pleural effusion. 1. Near complete whiteout of the left hemithorax likely represent a combination of partial collapse of the left lung and large pleural effusion 2. Cardiomegaly 3. The right lung is grossly clear. FLUORO FOR SURGICAL PROCEDURES    Result Date: 2/16/2023  EXAMINATION: SPOT FLUOROSCOPIC IMAGES 2/16/2023 6:54 am TECHNIQUE: Fluoroscopy was provided by the radiology department for procedure. Radiologist was not present during examination. FLUOROSCOPY DOSE AND TYPE: Radiation Exposure Index: Fluoroscopy time equals 56.5 seconds. Total dose equals 43.68 mGy, COMPARISON: None HISTORY: ORDERING SYSTEM PROVIDED HISTORY: pain TECHNOLOGIST PROVIDED HISTORY: Reason for exam:->pain What reading provider will be dictating this exam?->CRC Intraprocedural imaging. FINDINGS: 12 spot images of the lumbar spine were obtained. Intraprocedural fluoroscopic spot images as above. See separate procedure report for more information. IMPRESSION AND SUGGESTION:    Large left-sided pleural effusion, status for Pleurx catheter   worsening shortness of breath secondary to #1  SCOTTY  Heart failure  A-fib, on Coumadin at baseline currently Coumadin on hold    Pleurx catheter  drain every other day oncology is following regarding mesothelioma. .  Continue O2 to keep saturation 90% or above and continue present treatment plan. NOTE: This report was transcribed using voice recognition software.  Every effort was made to ensure accuracy; however, inadvertent computerized transcription errors may be present.       Electronically signed by Yaneli Lyle MD, FCCP on 2/24/2023 at 1:42 PM

## 2023-02-24 NOTE — PROGRESS NOTES
Infectious Disease Progress Note         Patient is a followup regarding exudative L pleural effusion in the setting of hx of mesothelioma, CHF hx. patient has grown CONS (Staph epi ) from pleural fluid with retained pleurex cath. The Staph epi may have possibly been a contaminant in the     Still short of breath and extremely weak. Daily drainage from cath. Since patient is symptomatic with multiple co-morbidities and cath retained, he is still on IV Vanco/ Maurizio. Called micro lab to discuss. The gram stain was no organisms seen. The anaerobic bottle only was the one that turned positive but not until 2/21. Nothing was seen on the plate. Likely a skin contaminant. CT chest:   Indwelling chest tube identified on the left with small left pleural effusion   and trace anterior pneumothorax. Consolidation seen in airspace disease   throughout the left upper and lower lobes to suggest a multifocal   superimposed pneumonia. Soft tissue density seen in the hilar region to   suggest possible reactive adenopathy. Moderate-sized pericardial effusion. Minimal atelectatic changes seen at the right lung base. Incidental stones   in the gallbladder with small hiatal hernia. Lab Results   Component Value Date    WBC 7.3 02/23/2023    HGB 7.3 (L) 02/23/2023    HCT 22.7 (L) 02/23/2023    MCV 89.6 02/23/2023     02/23/2023     Lab Results   Component Value Date/Time     02/23/2023 04:59 AM    K 3.4 02/23/2023 04:59 AM     02/23/2023 04:59 AM    CO2 20 02/23/2023 04:59 AM    BUN 27 02/23/2023 04:59 AM    CREATININE 1.37 02/23/2023 04:59 AM    GLUCOSE 109 02/23/2023 04:59 AM    GLUCOSE 85 04/25/2012 01:30 PM    CALCIUM 8.2 02/23/2023 04:59 AM        WBC trends are being monitored. Antibiotic doses are being adjusted per most recent renal labs.      Vitals:    02/23/23 2013   BP: (!) 112/52   Pulse: 67   Resp:    Temp:    SpO2:            Patient Active Problem List   Diagnosis    Ventral hernia Ventral hernia, recurrent    Polycythemia    Spinal stenosis of lumbar region with neurogenic claudication    Atherosclerosis of native artery of both lower extremities with intermittent claudication (HCC)    Atrial fibrillation (HCC)    BPH with obstruction/lower urinary tract symptoms    Congenital cystic kidney disease    Venous insufficiency    Intermittent claudication (HCC)    Transient ischemic attack    Sleep apnea    Rosacea    Fuchs' corneal dystrophy    Excessive daytime sleepiness    Hx of blood clots    Former smoker    Lumbar stenosis with neurogenic claudication    Congestive heart failure (HCC)    Lumbar spondylosis    Balance disorder    Postlaminectomy syndrome, lumbar region    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 Hx of mesothelioma  SSS with pacemaker hx  A fib hx  Melena/ constipation hx  Hx of polycythemia      PLAN:  Call out to micro, hospitalist, pulmonology, and his family to discuss the case. Would be best to repeat the fluid culture for confirmation after stopping the Vanco  Meropenem started for possible superimposed pneumonia based on clinical symptoms and CT scan. Will need to discuss with pulmonology.      Imaging and labs were reviewed per medical records and any ID pertinent labs were also addressed    Time spent on this case today > 60 min     Pearl Powell DO

## 2023-02-24 NOTE — PROGRESS NOTES
Physical Therapy Missed Treatment   Facility/Department: [facility] MED SURG C466/[id number]    NAME: Daniel Rust    : 1935 (82 y.o.)  MRN: [id number]    Account: [de-identified]  Gender: male         [x]Patient Declines PT Treatment: Pt just had pleural tube removed and was up for toileting . Pt requests to be seen tomorrow. [] Patient Unavailable: Will attempt PT treatment again at earliest convenience.         Electronically signed by Mahi Uriarte PTA on 23 at 3:37 PM EST

## 2023-02-24 NOTE — PROGRESS NOTES
Lab called with a critical h/h of 7.0/20. 3. Pt asymptomatic. VSS. Messaged NP. Waiting for response. Will continue to monitor.  Electronically signed by Jeanette Peacock RN on 2/24/2023 at 6:02 AM

## 2023-02-24 NOTE — CARE COORDINATION
Inpatient Rehab referral received. Met with patient and explained [facility] Acute Inpatient Rehab program and requirements, including 3 hours of intense therapy daily, anticipated length of stay and goal of discharge to home. All questions answered and patient verbalized understanding. Freedom of choice provided and patient requests admit to [facility] if appropriate. PM&R consult pending. Spoke with the patient and his spouse about acute inpatient rehab. They would like acute inpatient rehab to get stronger then DC home.   Electronically signed by Carol Chapa RN on 2/24/23 at 11:02 AM EST

## 2023-02-24 NOTE — PROGRESS NOTES
Cardiology Progress Note      Date:   2/24/2023  Patient name:  Ritesh Pérez  Date of admission:  2/18/2023  3:39 AM  MRN:   [id number]  YOB: 1935  Time of Consult:  2:19 PM    Consulting Cardiologist: Dr. Katelyn Gross MD  Primary Cardiologist:  Dr. Dudley Melton    Referring Provider: Dr. Ada Vazquez MD    Patient lying in bed still mildly dyspneic with cough. No angina. No palpitation. Vital signs stable telemetry with atrial pacing varying with sinus rhythm  On exam there is no evidence of pulses paradoxus lungs with diffuse rhonchi    Echo: As noted below preserved LV systolic function without significant valvular disease. There is a moderate-sized pericardial effusion without hemodynamic evidence of tamponade    Impression  Patient with moderate-sized pericardial effusion with history of malignancy. The effusion is not hemodynamically significant at this time. There is pericardial thickening around the RV free wall. This could be a sympathetic effusion secondary to the very large left pleural effusion that was recently decompressed. Could be viral.  And that it is a new finding would repeat echo in 3 days this coming Monday to assure no progression. He could still go to rehab. Mateo Sandifer, MD        Consult HPI:   Ritesh Pérez is a 82 y.o.  male patient who is being at the request of Dr. Carisa Rivera for inpatient consultation of pericardial effusion. He was admitted on 2/18/2023. Previous [facility] and [address] [facility] records have been reviewed in detail. 82 yr old male with a PMH of atrial fibrillation with long term anticoagulation, TIA, HTN, HLD, sinus node dysfunction with PPM, CAD with remote stenting, intermittent claudication, chronic shortness of breath, BPH, Polycythemia, traumatic pneumothorax s/p fall 4/2022, recent diagnosis of mesothelioma that was transferred from [facility] for possible thoracotomy/VATS decortication with Dr. Carisa Rivera for large loculated pleural effusion.  Chest x-ray on admission showed near complete whiteout of the left hemithorax likely combination of partial collapse of the left lung and large pleural effusion, cardiomegaly. Abnormal labs today:  Bun/creat 27/1.37, GFR 80.3, Hgb/hct 7.3/22.7, INR 1.4. He is currently resting in bed with his wife at bedside. He states other than being fatigued and weak he feels okay. He denies any chest pain, chest pressure, chest tightness lightheadedness or dizziness. He does admit to chronic shortness of breath that he feels is stable. Cardiac History/Testin2022 PPM: [employer] XT DR MRI [id number]  5/3/2022 FUAD:  LVEF 55-60%, No atrial thrombus noted   3/8/2022 ECHO:  Normal LVEF, 1 + AR,   5/3/2022 Cardioversion  Remote coronary PTCA    [facility] Medication List 2023  Medication Name Instruction   Aspirin 81 MG TABS TAKE 1 TAB DAILY   Doxepin HCl - 3 MG Oral Tablet TAKE 1 TABLET BY MOUTH AT BEDTIME AS NEEDED (INSOMNIA). Fish Oil 1200 MG Oral Capsule take 2 caps daily   Furosemide 20 MG Oral Tablet TAKE 1 TABLET DAILY. Losartan Potassium 100 MG Oral Tablet TAKE 1 TABLET BY MOUTH EVERY DAY   Meloxicam 15 MG Oral Tablet TAKE 1 TABLET DAILY AS NEEDED. Metoprolol Tartrate 50 MG Oral Tablet TAKE 1/2 TABLET ONCE A DAY. Nitroglycerin 0.4 MG Sublingual Tablet Sublingual USE AS NEEDED FOR CHEST PAIN   Potassium Chloride Sally ER 20 MEQ Oral Tablet Extended Release TAKE 1 TABLET DAILY. Rosuvastatin Calcium 10 MG Oral Tablet TAKE 1 TABLET BY MOUTH EVERY DAY   Vitamin D (Ergocalciferol) 1.25 MG (75647 UT) Oral Capsule TAKE 1 CAPSULE WEEKLY. Warfarin Sodium 4 MG Oral Tablet TAKE AS DIRECTED Per  [address]         Allergies:   Allergies   Allergen Reactions    Benadryl [Diphenhydramine Hcl] Anxiety    Diphenhydramine Anxiety     anxious       Past Medical History:  Past Medical History:   Diagnosis Date    A-fib ([facility] [address]     approx 1 year ago-cardioverted    Arthritis     Baker's cyst of knee, left     Cancer ([facility]     mesothelioma  radiation treatments    Cerebral artery occlusion with cerebral infarction ([facility] [address]     TIA sx felt funny --     Congestive heart failure ([facility] [address] 2022    Coronary artery disease     HTN (hypertension)     meds > 20 yrs    Hx of blood clots     DVT left leg     Hyperlipidemia     meds > 20 yrs    Pacemaker 2022    Polycythemia     Torn meniscus     left knee       Past Surgical History:  Past Surgical History:   Procedure Laterality Date    BACK SURGERY      COLONOSCOPY      COLONOSCOPY      CORONARY ANGIOPLASTY WITH STENT PLACEMENT  10/2006    x 1 cardiac stent    ENDOSCOPY, COLON, DIAGNOSTIC      EYE SURGERY      [place] with IOL OU    HERNIA REPAIR  2013    redo ca mesh in [id number].4616 -- umbilical hernia    JOINT REPLACEMENT Bilateral  &     Bilateral TKR    KNEE SURGERY Left      arthroscopic surgery to repair meniscus of left knee    LAMINECTOMY N/A 2021    RIGHT BILATERAL L2-3 3-4 4-5 MICRODECOMPRESSION performed by Adela Heck MD at [facility] 22    PAIN MANAGEMENT PROCEDURE N/A 2023    ATTEMPTED SPINAL CORD STIMULATOR PERMANENT PLACEMENT performed by Jenelle Groves MD at [address] N/A 1/10/2023    SPINAL CORD STIMULATOR TRIAL performed by Jenelle Groves MD at [place]  age 8    UMBILICAL HERNIA REPAIR  2012    UPPER GASTROINTESTINAL ENDOSCOPY N/A 2023    EGD DIAGNOSTIC ONLY performed by Yadira Galo MD at [facility]       Family History:       Problem Relation Age of Onset    Heart Attack Mother     Other Mother          in MVA    Other Father          at age 80    Other Sister          as infant    Cancer Brother     Other Brother         unknown medical hx    Other Brother          at age 61 due to accident    Cancer Daughter         colon & lung cancer    Colon Cancer Daughter     No Known Problems Son     Colon Cancer Other     Breast Cancer Other        Social  History:     Social History     Tobacco Use    Smoking status: Former     Packs/day: 0.50     Years: 10.00     Pack years: 5.00     Types: Cigarettes     Quit date: 1968     Years since quittin.8    Smokeless tobacco: Never   Substance Use Topics    Alcohol use: Yes     Comment: social       Home Medications:    Prior to Admission medications    Medication Sig Start Date End Date Taking? Authorizing Provider   carboxymethylcellulose (THERATEARS) 1 % ophthalmic gel Apply to eye    Historical Provider, MD   melatonin 5 MG TABS tablet Take by mouth    Historical Provider, MD   enoxaparin (LOVENOX) 100 MG/ML Inject into the skin 2 times daily    Historical Provider, MD   metoprolol tartrate (LOPRESSOR) 50 MG tablet Take 25 mg by mouth 2 times daily    Historical Provider, MD   Elastic Bandages & Supports ([facility]) [address] [facility] 1 each by Does not apply route daily Thigh high 20-30 mmhg graduated compression stockings both legs wear daily during day and off Qhs. Wear as tolerated. Do not wear if they cause increased pain. 22   John Macias, APRN - CNP   losartan (COZAAR) 100 MG tablet Take 100 mg by mouth in the morning. Historical Provider, MD   meloxicam (MOBIC) 15 MG tablet Take 15 mg by mouth in the morning. Patient not taking: Reported on 2023    Historical Provider, MD   potassium chloride (KLOR-CON M) 20 MEQ extended release tablet Take 20 mEq by mouth in the morning and 20 mEq before bedtime.     Historical Provider, MD   rosuvastatin (CRESTOR) 10 MG tablet TAKE 1 TABLET BY MOUTH EVERY DAY 22   Historical Provider, MD   furosemide (LASIX) 20 MG tablet Take 20 mg by mouth daily    Historical Provider, MD   warfarin (COUMADIN) 2.5 MG tablet Take 4 mg by mouth daily Indications: T-Th-Sat - Sun Daily per INR levels    Historical Provider, MD   nitroGLYCERIN (NITROSTAT) 0.4 MG SL tablet Place 0.4 mg under the tongue every 5 minutes as needed for Chest pain up to max of 3 total doses. If no relief after 1 dose, call 911. Historical Provider, MD   Omega-3 Fatty Acids (FISH OIL) 1200 MG CAPS Take by mouth daily    Historical Provider, MD   Cholecalciferol (VITAMIN D3) 125 MCG (5000 UT) TABS Take by mouth daily    Historical Provider, MD   CPAP Machine MISC by Does not apply route    Historical Provider, MD   warfarin (COUMADIN) 5 MG tablet Take 5 mg by mouth daily Indications: Tosjmi-Rizkdzffe-Ciowqb Daily per INR levels 2/26/13   Historical Provider, MD   aspirin 81 MG EC tablet Take 81 mg by mouth daily.       Historical Provider, MD       Current Medications:      IV Medications:  Current Facility-Administered Medications   Medication Dose Route Frequency Provider Last Rate Last Admin    warfarin (COUMADIN) tablet 4 mg  4 mg Oral Once per day on Sun Tue Thu Sat Tad Gerardo MD   4 mg at 02/23/23 1749    warfarin (COUMADIN) tablet 5 mg  5 mg Oral Once per day on Mon Wed Fri Tad Gerardo MD   5 mg at 02/22/23 1829    enoxaparin (LOVENOX) injection 120 mg  1 mg/kg SubCUTAneous BID Tad Gerardo MD   120 mg at 02/24/23 8982    acetaminophen (TYLENOL) tablet 650 mg  650 mg Oral Q4H PRN Dylan Jaimes MD   650 mg at 02/22/23 1545    metoprolol tartrate (LOPRESSOR) tablet 25 mg  25 mg Oral BID Jerl Randy Sedar, DO   25 mg at 02/24/23 9233    rosuvastatin (CRESTOR) tablet 10 mg  10 mg Oral Daily Jerl Randy Sedar, DO   10 mg at 02/24/23 9252    [Held by provider] losartan (COZAAR) tablet 100 mg  100 mg Oral Daily Jerl Randy Sedar, DO        furosemide (LASIX) tablet 20 mg  20 mg Oral Daily Luvenia Res D Sedar, DO   20 mg at 02/24/23 1591    aspirin EC tablet 81 mg  81 mg Oral Daily Agustin D Sedar, DO   81 mg at 02/24/23 0946    pantoprazole (PROTONIX) 40 mg in sodium chloride (PF) 0.9 % 10 mL injection  40 mg IntraVENous Q12H Cydney Ryan MD   40 mg at 02/24/23 0316    ondansetron (ZOFRAN-ODT) disintegrating tablet 4 mg  4 mg Oral Q8H PRN Sasha Ruslan Sedar, DO        Or    ondansetron (ZOFRAN) injection 4 mg  4 mg IntraVENous Q6H PRN Sasha Ruslan Sedar, DO        meropenem (MERREM) 1,000 mg in sodium chloride 0.9 % 100 mL IVPB (mini-bag)  1,000 mg IntraVENous Q8H Agustin D Sedar, DO   Stopped at 02/24/23 1007    melatonin disintegrating tablet 5 mg  5 mg Oral Nightly Sasha Ruslan Sedar, DO   5 mg at 02/23/23 2013    guaiFENesin [facility]) extended release tablet 600 mg  600 mg Oral BID Sasha Ruslan Sedar, DO   600 mg at 02/24/23 6524    ipratropium-albuterol (DUONEB) nebulizer solution 1 ampule  1 ampule Inhalation Q4H PRN Sasha Ruslan Sedar, DO        oxyCODONE (ROXICODONE) immediate release tablet 5 mg  5 mg Oral Q4H PRN Darrick Mejia MD   5 mg at 02/22/23 2029    Or    oxyCODONE (ROXICODONE) immediate release tablet 10 mg  10 mg Oral Q4H PRN Darrick Mejia MD   10 mg at 02/20/23 1313    naloxegol (MOVANTIK) tablet 25 mg  25 mg Oral JR Mejia MD   25 mg at 02/24/23 2142     Facility-Administered Medications Ordered in Other Encounters   Medication Dose Route Frequency Provider Last Rate Last Admin    sodium chloride flush 0.9 % injection 10 mL  10 mL IntraVENous PRN [facility]   10 mL at 07/10/19 1220       ROS:   12 point review of systems was obtained in detail and is negative other than that noted above or below. Review of Systems  Constitutional: No weight loss, fever, chills, weakness. Positive for  fatigue. HEENT: No visual loss, blurred vision, double vision or yellow sclerae. Skin: No rash or itching  Cardiovascular:  No chest pain, pressure or discomfort. No palpitations or edema. Respiratory:  Positive for chronic shortness of breath, denies cough or sputum. Gastrointestinal:  No anorexia, nausea, vomiting or diarrhea. No abdominal pain. No bloody or dark tarry stools. Neurological:  No headache, dizziness, syncope, paralysis, ataxia, numbness or tingling in the extremities.   No change in bowel or bladder control. Musculoskeletal:  No muscle, back pain, joint pain or stiffness. Hematologic: No anemia, bleeding or bruising. Vital Signs:  Vitals:    02/1935 02/23/23 2013 02/24/23 0710 02/24/23 0937   BP: (!) 112/52 (!) 112/52 (!) 111/55 118/60   Pulse: 67 67 51 75   Resp: 20  16    Temp: 99.9 °F (37.7 °C)      TempSrc: Oral      SpO2: 97%  100%    Weight:       Height:         No intake or output data in the 24 hours ending 02/24/23 1419      Patient Vitals for the past 96 hrs (Last 3 readings):   Weight   02/21/23 0816 269 lb (122 kg)       Physical Examination:  Constitutional:  awake/alert/oriented x3, no distress, alert and cooperative. Morbidly obese. Eyes:  PERRL, sclera clear, conjunctiva pink. EMMT:  mucous membranes  moist, no apparent injury, no lesion seen. Head/Neck:  Neck supple, no apparent injury, thyroid without mass or tenderness, No JVD, trachea midline, no bruits. Respiratory/Thorax: Diminished throughout  Cardiovascular: Distant heart sounds,  regular, rate and rhythm, no murmurs,  normal S1 and S2, PMI non displaced. Gastrointestinal:   distended, firm, non-tender, no rebound tenderness or guarding, Morbidly obese. Genitourinary:  deferred  Musculoskeletal:  No apparent injury. Extremities:  No cyanosis, bilateral lower extremity edema, contusions or wounds, no clubbing. DP 2+ equal bilaterally. Radial 2+ equal bilaterally. Neurological:  Alert and oriented x3. Moves extremities spontaneous with purpose  Psychological:  Appropriate mood and behavior  Skin:  Warm and dry,  no lesions or rashes. Diagnostics:    EKG:  Pending    ECHO:  Moderate circumferential pericardial effusion. Does not meet criteria for tamponade. IVC is mildly dilated but with normal respiratory variation. There is some resp variation of MV inflow but <25% and some resp variation   of RV inflow but <35%.    There is no RV sys collapse   There is no Septal bounce   There is diastolic but not systolic collapse of right atrium. There is moderate pericardial thickening around the RV free wall. Normal left ventricle structure and function. Left ventricular ejection fraction is visually estimated at 55-60%. Normal diastlic functioni   Normal right ventricle structure and function. Normal right ventricle systolic pressure. Normal right atrium. No systolic collapse, early woodruff collapse which is non specific   Normal mitral valve structure and function. Normal aortic valve structure and function. Normal tricuspid valve structure and function. Telemetry:  No telemetry . Lab Data:  BMP:  Recent Labs     02/22/23  0619 02/23/23 0459 02/24/23 0431    137 140   K 3.9 3.4 3.5   * 106 108*   CO2 20 20 23   BUN 31* 27* 24*   CREATININE 1.40* 1.37* 1.35*   LABGLOM 49.0* 50.3* 51.2*       CBC:  Recent Labs     02/22/23  0619 02/23/23 0459 02/24/23  0432 02/24/23  1141   WBC 8.5 7.3 7.0  --    RBC 2.55* 2.54* 2.29*  --    HGB 7.5* 7.3* 7.0* 7.2*   HCT 22.9* 22.7* 20.3* 21.7*   MCV 89.8 89.6 88.7  --    MCH 29.6 28.9 30.5  --    MCHC 32.9* 32.2* 34.4  --    RDW 15.3* 15.7* 15.7*  --     217 200  --        Cardiac Enzymes:   No results for input(s): CKTOTAL, CKMB, TROPONINI in the last 72 hours. Hepatic Function Panel:  No results for input(s): ALKPHOS, ALT, AST, PROT, BILITOT, BILIDIR, LABALBU in the last 72 hours. Magnesium:  Recent Labs     02/24/23  0431   MG 2.1       Pro-BNP:  No results found for: PROBNP    INR:  Recent Labs     02/22/23  1528 02/23/23 0459 02/24/23 0431   PROTIME 16.6* 17.7* 19.3*   INR 1.3 1.4 1.6       TSH:  No results found for: TSH    Lipid Profile:  No results found for: TRIG, HDL, LDLCALC, LDLDIRECT, LABVLDL    HgbA1C:  No results found for: LABA1C    Lactate Level:  No results for input(s): LACTA in the last 72 hours.     CMP:  Recent Labs     02/22/23  0619 02/23/23  0459 02/24/23  0431    137 140   K 3.9 3.4 3.5   * 106 108* CO2 20 20 23   BUN 31* 27* 24*   CREATININE 1.40* 1.37* 1.35*   GLUCOSE 105* 109* 110*   CALCIUM 8.6 8.2* 7.9*       Amylase:  No results for input(s): AMYLASE in the last 72 hours. Lipase:  No results for input(s): LIPASE in the last 72 hours. ABG:  No results for input(s): PH, PO2, PCO2, HCO3, BE, O2SAT in the last 72 hours. Radiology:   CT CHEST WO CONTRAST   Final Result   Indwelling chest tube identified on the left with small left pleural effusion   and trace anterior pneumothorax. Consolidation seen in airspace disease   throughout the left upper and lower lobes to suggest a multifocal   superimposed pneumonia. Soft tissue density seen in the hilar region to   suggest possible reactive adenopathy. Moderate-sized pericardial effusion. Minimal atelectatic changes seen at the right lung base. Incidental stones   in the gallbladder with small hiatal hernia. US DUP UPPER EXTREMITY RIGHT VENOUS   Final Result   No evidence of DVT. XR CHEST PORTABLE   Final Result   1. Minimal partial clearing of the left lung. The previously noted left   pleural effusion is smaller   2. Stable position of the left chest tube   3. The right lung is clear. XR CHEST PORTABLE   Final Result   1. Apparent interval placement of left chest tube catheter. No pneumothorax. 2.  Persistent opacification of the left hemithorax. XR CHEST PORTABLE   Final Result   1. Near complete whiteout of the left hemithorax likely represent a   combination of partial collapse of the left lung and large pleural effusion   2. Cardiomegaly   3. The right lung is grossly clear. XR ABDOMEN (KUB) (SINGLE AP VIEW)   Final Result   1. Complete opacification of left hemithorax compatible with some   combination of pleural effusion and atelectasis. Secondary obscuration of   left heart border. 2.  Nonobstructive bowel gas pattern. No acute abdominal disease identified.       3.  Probable left renal stone. Impression:   Principal Problem:    Pleural effusion  Active Problems:    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[address]    Melena    Gastritis without bleeding    Impaired mobility and activities of daily living    Atrial fibrillation (HCC)    Lumbar stenosis with neurogenic claudication    Congestive heart failure ([address]  Resolved Problems:    * No resolved hospital problems. *    Patient Active Problem List   Diagnosis    Ventral hernia    Ventral hernia, recurrent    Polycythemia    Spinal stenosis of lumbar region with neurogenic claudication    Atherosclerosis of native artery of both lower extremities with intermittent claudication (HCC)    Atrial fibrillation (HCC)    BPH with obstruction/lower urinary tract symptoms    Congenital cystic kidney disease    Venous insufficiency    Intermittent claudication (HCC)    Transient ischemic attack    Sleep apnea    Rosacea    Fuchs' corneal dystrophy    Excessive daytime sleepiness    Hx of blood clots    Former smoker    Lumbar stenosis with neurogenic claudication    Congestive heart failure (HCC)    Lumbar spondylosis    Balance disorder    Postlaminectomy syndrome, lumbar region    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 Do not hesitate to call if you have any questions.     Electronically signed by Arash Carter MD, [facility] on 2/24/2023 at 2:19 PM

## 2023-02-24 NOTE — PROGRESS NOTES
[address] [facility] Pharmacokinetic Monitoring Service - Vancomycin    Consulting Provider: Dr Gianna Pan   Indication: pleural effusion  Target Concentration: Goal AUC/MANOLO 400-600 mg*hr/L  Day of Therapy: 7  Additional Antimicrobials: meropenem    Pertinent Laboratory Values: Wt Readings from Last 1 Encounters:   02/21/23 269 lb (122 kg)     Temp Readings from Last 1 Encounters:   02/23/23 99.9 °F (37.7 °C) (Oral)     Estimated Creatinine Clearance: 50 mL/min (A) (based on SCr of 1.35 mg/dL (H)). Recent Labs     02/23/23  0459 02/24/23  0431 02/24/23  0432   CREATININE 1.37* 1.35*  --    WBC 7.3  --  7.0     Procalcitonin: No results for input(s): PROCAL in the last 72 hours. Pertinent Cultures:  Recent Labs     02/19/23  1726   ORG Staphylococcus epidermidis*       MRSA Nasal Swab: N/A. Non-respiratory infection.     Recent vancomycin administrations                     vancomycin (VANCOCIN) 1,250 mg in sodium chloride 0.9 % 250 mL IVPB (ADDAVIAL) (mg) 1,250 mg New Bag 02/23/23 2148     1,250 mg New Bag 02/22/23 2152    vancomycin (VANCOCIN) 750 mg in sodium chloride 0.9 % 250 mL IVPB (mg) 750 mg New Bag 02/21/23 2157     750 mg New Bag  1016                    Assessment:  Date/Time Current Dose Concentration Timing of Concentration (h) AUC   2/24/23 0432 1250mg IV q24hr 15.7 6.75hr post dose 351   Note: Serum concentrations collected for AUC dosing may appear elevated if collected in close proximity to the dose administered, this is not necessarily an indication of toxicity    Plan:  Current dosing regimen is sub-therapeutic  Increase dose to vancomycin 1500mg IV q24hr  Repeat vancomycin concentration ordered for 2/26/23 @ 0600   Pharmacy will continue to monitor patient and adjust therapy as indicated    Thank you for the consult,  Brandy Witt [facility]  2/24/2023 6:15 AM

## 2023-02-24 NOTE — PROGRESS NOTES
Patient had LVATS in May 2022 - negative cultures. Was recently admitted to [facility] and started on Ceftriaxone and Azithromycin for possible CAP. CT with large loculated pleural effusion noted and he was transferred here. He was started on Vanco and Meropenem here - the culture from 2/19 from pleurex took two days to grow in one anaerobic bottle only. The gram stain negative, the plated culture negative and the other bottle was also negative for growth. The organism was Staph epi. The CT shows possible superimposed pneumonia. Comparison to CT read from [facility] done. Blood cultures at [facility] were negative. I have spoken to microbiology, pulmonology, hospitalist, and the wife this morning. Family has reached out to his oncologist as well, Dr Reece Dalton, and they feel more comfortable   [phone] Dr Reece Dalton. Plan: Will treat with Meropenem for superimposed penumonia x total 10 days and will stop the Vanco. The pleural fluid can be re-cultured after > 24 hours.      Pearl Powell, DO

## 2023-02-24 NOTE — CARE COORDINATION
[facility] Pre-Admission Screening Document      Patient Name: Suha Maher       MRN: [id number]    : 1935    Age: 82 y.o. Gender: male   Payor: Payor: MEDICARE / Plan: MEDICARE PART A AND B / Product Type: *No Product type* /   MSSP: Yes    Admitted from: [facility] Floor: 4WT  Attending Care Provider: Rebeca Fields MD  Inpatient Rehab Referring Care Provider: Dana Hurt MD  Primary Care Provider: Wilfrid Chaves DO  Inpatient Treatment Team including Consults: Treatment Team: Attending Provider: Rebeca Fields MD; Consulting Physician: Dana Hurt MD; Consulting Physician: Yulisa Macedo MD; Consulting Physician: Nasreen Chang MD; Consulting Physician: Avery Buitargo MD; Utilization Reviewer: Chitra Shrestha, RN; Utilization Reviewer: Dashawn Moreno, HÉCTOR; Consulting Physician: Eloisa Turner DO; Consulting Physician: Lanetta Buerger, MD; Consulting Physician: Katie Haile DO; Registered Nurse: Franklin Ryan RN; : Hilton Callow, HÉCTOR; Registered Nurse: Ariella Marcial, HÉCTOR; : Mercy Chirinos, MSW, LSW    Reason for Hospitalization:   1. Pleural effusion      No chief complaint on file. Isolation:No active isolations    Hospital Course:  Admit Date: 2023  3:39 AM  Inpatient Rehab Referral Date: 2023  Narrative of hospital course/history of present illness: Patient was at [facility] for nerve stimulator placement but was delayed d/t elevated INR, and then when procedure was initiated patient had a possible CSF leak and the procedure was aborted. The patient then became short of breath and was then found to have a large loculated pleural effusion. Then the patient was transferred to [address] [place] [address] for a thoracotomy VATS procedure with Dr Alisha Burton. 2023 Chest tube Insertion by Dr Alisha Burton    Thoracic Surgery-  Cultures from effusion positive. CT with near completely expanded lung.   Only a small loculation anteriorly. Agree with antibiotics and will leave Pleurx in and continue 3X weekly drainage. Hopefully repeated drainage and antibiotics will sterilize the pleural space and allow outpatient removal of drain. If signs of worsening infection, or if fluid recurs after Pleurx removal, then will plan VATS, but will try to avoid surgery for now. Pulmonology-Continue draining Pleurx daily, antibiotics, keep SAT 90-92%, and continue home CPAP    Oncology-The pt has mesothelioma s/p RT. He now has left pleural effusion for which Pleurx drain was placed. He has symptomatic improvement after drainage of the pleural fluid. The pt will follow-up with his medical onc[facility] treatment options including  possible chemotx or palliative care including intermittent drainage of pleural fluid  and possible Hospice referral if pt's  performance status is not adequate for chemotx. Treat with IV Venofer for low HGB     GI-Continue course current medical management per primary team. Continue IV PPI  daily. No planned colonoscopy, patient can follow-up as an outpatient for further evaluation and timing of colonoscopy.     Cardiology-Obtain Echo to assess pericardial effussion    Medical & Surgical History/Current Comorbidities:  Past Medical History:   Diagnosis Date    A-fib ([address]     approx 1 year ago-cardioverted    Arthritis     Baker's cyst of knee, left     Cancer ([address]     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    Cerebral artery occlusion with cerebral infarction ([address] 2000    TIA sx felt funny --     Congestive heart failure ([address] 03/08/2022    Coronary artery disease     HTN (hypertension)     meds > 20 yrs    Hx of blood clots 2012    DVT left leg     Hyperlipidemia     meds > 20 yrs    Pacemaker 07/19/2022    Polycythemia     Torn meniscus     left knee     Past Surgical History:   Procedure Laterality Date    BACK SURGERY      COLONOSCOPY  2009    COLONOSCOPY  2012    CORONARY ANGIOPLASTY WITH STENT PLACEMENT  10/2006    x 1 cardiac stent    ENDOSCOPY, COLON, DIAGNOSTIC      EYE SURGERY      [place] with IOL OU    HERNIA REPAIR  03/2013    redo ca mesh in [id number].1602 -- umbilical hernia    JOINT REPLACEMENT Bilateral 2009 & 2011    Bilateral TKR    KNEE SURGERY Left      arthroscopic surgery to repair meniscus of left knee    LAMINECTOMY N/A 02/08/2021    RIGHT BILATERAL L2-3 3-4 4-5 MICRODECOMPRESSION performed by Radha Nieto MD at [facility] 7/19/22    PAIN MANAGEMENT PROCEDURE N/A 2/16/2023    ATTEMPTED SPINAL CORD STIMULATOR PERMANENT PLACEMENT performed by Humble Luis MD at [address] N/A 1/10/2023    SPINAL CORD STIMULATOR TRIAL performed by Humble Luis MD at [place]  age 8    [address]  03/2012    UPPER GASTROINTESTINAL ENDOSCOPY N/A 2/21/2023    EGD DIAGNOSTIC ONLY performed by Buddy Diaz MD at [address] [place] [address] of [address] [place] [address] ACP-Advance Directive ACP-Power of     Not on File Not on File Not on File Not on File            Labs/Infection Control:  Recent Labs     02/22/23  0619 02/22/23  1528 02/23/23  0459 02/24/23  0431 02/24/23  0432   WBC 8.5  --  7.3  --  7.0   HGB 7.5*  --  7.3*  --  7.0*   HCT 22.9*  --  22.7*  --  20.3*     --  217  --  200   BUN 31*  --  27* 24*  --    CREATININE 1.40*  --  1.37* 1.35*  --    GLUCOSE 105*  --  109* 110*  --      --  137 140  --    K 3.9  --  3.4 3.5  --    INR  --  1.3 1.4 1.6  --    CALCIUM 8.6  --  8.2* 7.9*  --       Blood cultures:  No results for input(s): BC in the last 72 hours. Urinalysis/C&S:  No results for input(s): NITRITE, LABCAST, WBCUA, RBCUA, MUCUS, TRICHOMONAS, YEAST, BACTERIA, LEUKOCYTESUR, BLOODU, GLUCOSEU, KETUA, AMORPHOUS, LABURIN in the last 72 hours. Radiology:  XR ABDOMEN (KUB) (SINGLE AP VIEW)  Result Date: 2/18/2023  1. Complete opacification of left hemithorax compatible with some combination of pleural effusion and atelectasis. Secondary obscuration of left heart border. 2.  Nonobstructive bowel gas pattern. No acute abdominal disease identified. 3.  Probable left renal stone. CT CHEST WO CONTRAST  Result Date: 2023  Indwelling chest tube identified on the left with small left pleural effusion and trace anterior pneumothorax. Consolidation seen in airspace disease throughout the left upper and lower lobes to suggest a multifocal superimposed pneumonia. Soft tissue density seen in the hilar region to suggest possible reactive adenopathy. Moderate-sized pericardial effusion. Minimal atelectatic changes seen at the right lung base. Incidental stones in the gallbladder with small hiatal hernia. XR CHEST PORTABLE  Result Date: 2023  1. Minimal partial clearing of the left lung. The previously noted left pleural effusion is smaller 2. Stable position of the left chest tube 3. The right lung is clear. XR CHEST PORTABLE  Result Date: 2023  1. Apparent interval placement of left chest tube catheter. No pneumothorax. 2.  Persistent opacification of the left hemithorax. XR CHEST PORTABLE  Result Date: 2023  1. Near complete whiteout of the left hemithorax likely represent a combination of partial collapse of the left lung and large pleural effusion 2. Cardiomegaly 3. The right lung is grossly clear. US DUP UPPER EXTREMITY RIGHT VENOUS  Result Date: 2023  No evidence of DVT. FLUORO FOR SURGICAL PROCEDURES  Result Date: 2023  Intra procedural fluoroscopic spot images as above. See separate procedure report for more information.      EGD  Result Date: 2023  [address] [facility] Patient: Zoila Cameron MRN: [id number] : 1935 Account: Gender: Male Age: 82 Years Procedure: Upper GI endoscopy Date: 2023 Attending Physician: Janae Child Indications:        -  Anemia -  Melena Medications:        -  Monitored Anesthesia Care Complications:        -  No immediate complications. Estimated Blood Loss:        -  Estimated blood loss: none. Procedure:        - The Gastroscope was introduced through the mouth and advanced to the second           part of the duodenum.        -  The patient tolerated the procedure well. Findings:        -  A 2 cm hernia was found.        -  Esophagogastric landmarks were identified: the gastroesophageal junction           was found at 36 cm, the lower esophageal sphincter was found at 38 cm and the           upper extent of the gastric folds was found at 36 cm from the incisors. -  Patchy moderate inflammation characterized by erythema was found in the           gastric antrum and in the gastric body. -  The examined duodenum was normal.        -  The cardia and gastric fundus were normal on retroflexion.        - No old or fresh blood noted Impression:        -  2 cm hernia. -  Esophagogastric landmarks identified.        -  Gastritis. -  Normal examined duodenum.        -  No specimens collected. - No old or fresh blood noted Recommendation:        -  Put patient on a clear liquid diet starting today. -  Continue present medications. - Further recommendations per inpatient team Procedure Code(s):        - 13434, Esophagogastroduodenoscopy, flexible, transoral; diagnostic,           including collection of specimen(s) by brushing or washing, when performed           (separate procedure) Diagnosis Code(s):        - D64.9, Anemia, unspecified        - K92.1, Melena (includes Hematochezia)        - K44.9, Diaphragmatic hernia without obstruction or gangrene        - K29.70, Gastritis, unspecified, without bleeding       CPT(R) - 2022 copyright [employer] Medical Association. All Rights Reserved.        The CPT codes, CCI edits and ICD codes generated are intended as suggestions       and were generated based on input data. These codes are preliminary and upon        review may be revised to meet current compliance and payer requirements. The provider is responsible for the final determination of appropriate codes,       and modifiers. Scope Withdrawal Time:       00:07:45 Cira Gale MD This document has been electronically signed. Note Initiated:2/21/2023 Note Completed:2/21/2023 8:58 AM        Medications/IV's:  The patient is currently on coumadin, lovenox, and aspirin for DVT prophylaxis. Scheduled:    warfarin, 4 mg, Oral, Once per day on Sun Tue Thu Sat    warfarin, 5 mg, Oral, Once per day on Mon Wed Fri    enoxaparin, 1 mg/kg, SubCUTAneous, BID    metoprolol tartrate, 25 mg, Oral, BID    rosuvastatin, 10 mg, Oral, Daily    [Held by provider] losartan, 100 mg, Oral, Daily    furosemide, 20 mg, Oral, Daily    aspirin, 81 mg, Oral, Daily    pantoprazole (PROTONIX) 40 mg injection, 40 mg, IntraVENous, Q12H    meropenem, 1,000 mg, IntraVENous, Q8H    melatonin, 5 mg, Oral, Nightly    guaiFENesin, 600 mg, Oral, BID    naloxegol, 25 mg, Oral, QAM    PRN:  acetaminophen, ondansetron **OR** ondansetron, ipratropium-albuterol, oxyCODONE **OR** oxyCODONE    Allergies: Allergies   Allergen Reactions    Benadryl [Diphenhydramine Hcl] Anxiety    Diphenhydramine Anxiety     anxious         Most Recent Vitals, Height and Weight  /60   Pulse 75   Temp 99.9 °F (37.7 °C) (Oral)   Resp 16   Ht 5' 7\" (1.702 m)   Wt 269 lb (122 kg)   SpO2 100%   BMI 42.13 kg/m²     Weight Bearing Restrictions: WBAT       Current Diet Order: ADULT DIET; Regular;  Low Sodium (2 gm)    Skin: Chest tube on the Left   Wound Care Documentation:          Lungs:   Respiratory  Respiratory Pattern: Regular  Respiratory Depth: Normal  Respiratory Quality/Effort: Dyspnea with exertion  Chest Assessment: Chest expansion symmetrical  L Breath Sounds: Diminished  R Breath Sounds: Diminished  Level of Activity/Mobility: Up with assist      Cognition and Behavior:  Language Preference (if other than English):      Alertness/Behavior  Neuro (WDL): Exceptions to WDL  Level of Consciousness: Alert (0)  History of Falling: Yes Cognition Comment: follows commands consistently    Short Term Memory Deficits     History of Falling: Yes    Safety          Prior Level of Function and Living Arrangements:  Social/Functional History  Lives With: Spouse  Type of Home: House  Home Layout: One level (basement)  Home Access: Stairs to enter without rails (hoolds on to door jamb)  Entrance Stairs - Number of Steps: 2  Bathroom Shower/Tub: Walk-in shower  Home Equipment: Honora Sables, rolling, Rollator, Cane (uses rollator outside)  Has the patient had two or more falls in the past year or any fall with injury in the past year?: Yes (broken rib and puncture lung)  ADL Assistance: Independent  Homemaking Assistance: Needs assistance (staniding tolerance is limited)  Ambulation Assistance: Independent (ww)  Transfer Assistance: Independent  Active : No  Occupation: Retired  Type of Occupation: construction work  Additional Comments: right handed  Living Arrangements: Spouse/Significant Other  [place]: Spouse/Significant Other, Family Members, [employer]/[place] [facility]  Type of [address] None  Dental Appliances: Uppers, Lowers, At bedside  Vision - Corrective Lenses: None  Hearing Aid: None  Personal Equipment:   Dental Appliances: Uppers, Lowers, At bedside  Vision - Corrective Lenses: None  Hearing Aid: None      CURRENT FUNCTIONAL LEVEL:  Physical Therapy  Bed mobility:  Bed mobility  Rolling to Left: Stand by assistance (02/23/23 1336)  Rolling to Right: Stand by assistance (02/23/23 1336)  Supine to Sit: Moderate assistance (assist for trunk required) (02/23/23 1336)  Scooting: Stand by assistance (along edge of bed) (02/23/23 1336)  Bed Mobility Comments: HOB flat  - intermittent use of rails required (02/23/23 1336)  Transfers:  Transfers  Sit to Stand: Minimal Assistance; Moderate Assistance (heavy pull on rail in toilet transfer noted) (02/23/23 1337)  Stand to Sit: Minimal Assistance; Moderate Assistance (02/23/23 1337)  Comment: multiple trials with varying assistance (02/23/23 1337)  Gait:   Ambulation  Surface: Level tile (02/23/23 1340)  Device: Rolling Walker (02/23/23 1340)  Other Apparatus: O2 (02/23/23 1340)  Assistance: Minimal assistance (02/23/23 1340)  Quality of Gait: forward flexed trunk, heavy UE use and support, short step length, knee stability fair, SOB following  - 2 L O2 in place with at  95% (02/23/23 1340)  Distance: 25 feet x 2 (02/23/23 1340)  Stairs:  Stairs/Curb  Stairs?: No (02/23/23 1340)  W/C mobility:         Occupational Therapy  Hand Dominance: Right  ADL  Feeding: Unable to assess(comment) (02/23/23 1348)  Grooming: Stand by assistance; Increased time to complete (02/23/23 1348)  UE Bathing: Minimal assistance; Increased time to complete (02/23/23 1348)  LE Bathing: Moderate assistance; Increased time to complete (02/23/23 1348)  UE Dressing: Minimal assistance; Increased time to complete (02/23/23 1348)  LE Dressing: Maximum assistance; Increased time to complete (02/23/23 1348)  Toileting: Maximum assistance; Increased time to complete (02/23/23 1348)  Toilet Transfers  Toilet - Technique: Ambulating (02/23/23 1350)  Equipment Used: Grab bars (02/23/23 1350)  Toilet Transfer: Minimal assistance (02/23/23 1350)            Speech Language Pathology             Diet/Swallow:                     Current Conditions Requiring Inpatient Rehabilitation  Bowel/Bladder Dysfunction: Yes  Intervention Required = Frequent toileting, Bowel program, Briefs, and Check post void residual  Risk for Medical/Clinical Complications = moderate  Skin Healing/Breakdown Risk: Yes  Intervention Required = Side to side turns, Dressing changes, Surgical incision, Monitor for S/S of infection, and Plurex drain/chest tube site  Risk for Medical/Clinical Complications = moderate  Nutrition/Hydration Deficiency: Yes  Intervention Required = Monitor I&Os and Check Labs  Risk for Medical/Clinical Complications = moderate  Medical Comorbidities: Yes  Intervention Required = DVT risk and CAD  Risk for Medical/Clinical Complications = high    Rehab/Skilled Needs:   900 minutes of Intensive Acute Rehab therapy over 7 days/week  PT Treatment Time:  1.5 hrs/day  OT Treatment Time: 1.5 hrs/day  Rehabilitation Nursing   Case management/Social work  [address] [facility].  Dietitian/Nutrition  PICC/IV Medications  Plurex Chest tube    Cultural needs:   Values / Beliefs  Do You Have Any Ethnic, Cultural, Sacramental, or Spiritual Restorationist Needs You Would Like Us To Be Aware of While You Are in the Hospital : No    Funding needs:   Potential Assistance Purchasing Medications: No      Expected Level of Improvement with Rehab  Assist for ADL Supervision / [address] for Transfers Supervision / [address] for Gait Coal    Patient's willingness to participate: Yes  Patient's ability to tolerate proposed care: Yes  Patient/Family Goals of Rehab (in patient's/family's own words): Get Stronger and return home    Anticipated Discharge Plan:  Home with   Home Health, RN PT OT Aide to be determined  TBD    Barriers to Discharge:  Home entry accessibility  Equipment needs  Caregiver availability  Resource availability      Rehab evaluation plan: Recommend Acute Inpatient Rehab  Rehabilitation Impairment Group Code: 10.9  Rehab Impairment Group: Medical: Severe Pulmonary Debility  Estimated Length of Stay (days): 12  Rehab Diagnosis: Impaired mobility and ADL's due to severe pulmonary debility  Reviewer's Signature: Electronically signed by Trevor Stinson RN on 2/24/23 at 11:56 AM EST     I have reviewed and concur with the above Preadmission Screening.    Rehab Admitting Doctor: Dr. Abelardo Ken DO

## 2023-02-25 ENCOUNTER — HOSPITAL ENCOUNTER (INPATIENT)
Age: 88
LOS: 16 days | Discharge: HOME HEALTH | DRG: 947 | End: 2023-03-13
Attending: PHYSICAL MEDICINE & REHABILITATION | Admitting: PHYSICAL MEDICINE & REHABILITATION
Payer: MEDICARE

## 2023-02-25 VITALS
BODY MASS INDEX: 38.14 KG/M2 | RESPIRATION RATE: 18 BRPM | WEIGHT: 243 LBS | HEIGHT: 67 IN | TEMPERATURE: 98.6 F | DIASTOLIC BLOOD PRESSURE: 68 MMHG | OXYGEN SATURATION: 98 % | HEART RATE: 66 BPM | SYSTOLIC BLOOD PRESSURE: 124 MMHG

## 2023-02-25 DIAGNOSIS — M96.1 LUMBAR POST-LAMINECTOMY SYNDROME: ICD-10-CM

## 2023-02-25 DIAGNOSIS — Z78.9 IMPAIRED MOBILITY AND ACTIVITIES OF DAILY LIVING: Primary | ICD-10-CM

## 2023-02-25 DIAGNOSIS — Z74.09 IMPAIRED MOBILITY AND ACTIVITIES OF DAILY LIVING: Primary | ICD-10-CM

## 2023-02-25 DIAGNOSIS — M47.816 LUMBAR SPONDYLOSIS: ICD-10-CM

## 2023-02-25 DIAGNOSIS — J18.9 PNEUMONIA OF BOTH LUNGS DUE TO INFECTIOUS ORGANISM, UNSPECIFIED PART OF LUNG: ICD-10-CM

## 2023-02-25 DIAGNOSIS — I50.9 CONGESTIVE HEART FAILURE, UNSPECIFIED HF CHRONICITY, UNSPECIFIED HEART FAILURE TYPE (HCC): ICD-10-CM

## 2023-02-25 DIAGNOSIS — I12.9 HYPERTENSIVE CHRONIC KIDNEY DISEASE W STG 1-4/UNSP CHR KDNY: ICD-10-CM

## 2023-02-25 DIAGNOSIS — I49.5 SICK SINUS SYNDROME (HCC): ICD-10-CM

## 2023-02-25 DIAGNOSIS — I48.91 ATRIAL FIBRILLATION, UNSPECIFIED TYPE (HCC): ICD-10-CM

## 2023-02-25 LAB
ANION GAP SERPL CALCULATED.3IONS-SCNC: 12 MEQ/L (ref 9–15)
BASOPHILS ABSOLUTE: 0 K/UL (ref 0–0.2)
BASOPHILS RELATIVE PERCENT: 0.5 %
BUN BLDV-MCNC: 22 MG/DL (ref 8–23)
CALCIUM SERPL-MCNC: 8 MG/DL (ref 8.5–9.9)
CHLORIDE BLD-SCNC: 108 MEQ/L (ref 95–107)
CO2: 22 MEQ/L (ref 20–31)
CREAT SERPL-MCNC: 1.31 MG/DL (ref 0.7–1.2)
EKG ATRIAL RATE: 63 BPM
EKG P AXIS: 14 DEGREES
EKG P-R INTERVAL: 214 MS
EKG Q-T INTERVAL: 378 MS
EKG QRS DURATION: 94 MS
EKG QTC CALCULATION (BAZETT): 386 MS
EKG R AXIS: 31 DEGREES
EKG T AXIS: 14 DEGREES
EKG VENTRICULAR RATE: 63 BPM
EOSINOPHILS ABSOLUTE: 0.3 K/UL (ref 0–0.7)
EOSINOPHILS RELATIVE PERCENT: 3.9 %
GFR SERPL CREATININE-BSD FRML MDRD: 53.1 ML/MIN/{1.73_M2}
GLUCOSE BLD-MCNC: 97 MG/DL (ref 70–99)
HCT VFR BLD CALC: 21.4 % (ref 42–52)
HCT VFR BLD CALC: 21.7 % (ref 42–52)
HEMOGLOBIN: 7 G/DL (ref 14–18)
HEMOGLOBIN: 7.2 G/DL (ref 14–18)
INR BLD: 1.9
LYMPHOCYTES ABSOLUTE: 0.7 K/UL (ref 1–4.8)
LYMPHOCYTES RELATIVE PERCENT: 8.2 %
MAGNESIUM: 2.2 MG/DL (ref 1.7–2.4)
MCH RBC QN AUTO: 28.8 PG (ref 27–31.3)
MCHC RBC AUTO-ENTMCNC: 32.9 % (ref 33–37)
MCV RBC AUTO: 87.5 FL (ref 79–92.2)
MONOCYTES ABSOLUTE: 1.1 K/UL (ref 0.2–0.8)
MONOCYTES RELATIVE PERCENT: 13.2 %
NEUTROPHILS ABSOLUTE: 6.1 K/UL (ref 1.4–6.5)
NEUTROPHILS RELATIVE PERCENT: 74.2 %
PDW BLD-RTO: 16 % (ref 11.5–14.5)
PLATELET # BLD: 233 K/UL (ref 130–400)
POTASSIUM REFLEX MAGNESIUM: 3.3 MEQ/L (ref 3.4–4.9)
PROTHROMBIN TIME: 22 SEC (ref 12.3–14.9)
RBC # BLD: 2.44 M/UL (ref 4.7–6.1)
SARS-COV-2, NAAT: NOT DETECTED
SODIUM BLD-SCNC: 142 MEQ/L (ref 135–144)
WBC # BLD: 8.2 K/UL (ref 4.8–10.8)

## 2023-02-25 PROCEDURE — 6370000000 HC RX 637 (ALT 250 FOR IP): Performed by: FAMILY MEDICINE

## 2023-02-25 PROCEDURE — 6360000002 HC RX W HCPCS: Performed by: FAMILY MEDICINE

## 2023-02-25 PROCEDURE — 97116 GAIT TRAINING THERAPY: CPT

## 2023-02-25 PROCEDURE — 36415 COLL VENOUS BLD VENIPUNCTURE: CPT

## 2023-02-25 PROCEDURE — 99232 SBSQ HOSP IP/OBS MODERATE 35: CPT | Performed by: INTERNAL MEDICINE

## 2023-02-25 PROCEDURE — 2580000003 HC RX 258: Performed by: FAMILY MEDICINE

## 2023-02-25 PROCEDURE — 6370000000 HC RX 637 (ALT 250 FOR IP): Performed by: INTERNAL MEDICINE

## 2023-02-25 PROCEDURE — 94640 AIRWAY INHALATION TREATMENT: CPT

## 2023-02-25 PROCEDURE — 87635 SARS-COV-2 COVID-19 AMP PRB: CPT

## 2023-02-25 PROCEDURE — 85014 HEMATOCRIT: CPT

## 2023-02-25 PROCEDURE — 2580000003 HC RX 258: Performed by: INTERNAL MEDICINE

## 2023-02-25 PROCEDURE — 85018 HEMOGLOBIN: CPT

## 2023-02-25 PROCEDURE — 6360000002 HC RX W HCPCS: Performed by: INTERNAL MEDICINE

## 2023-02-25 PROCEDURE — 94664 DEMO&/EVAL PT USE INHALER: CPT

## 2023-02-25 PROCEDURE — 2700000000 HC OXYGEN THERAPY PER DAY

## 2023-02-25 PROCEDURE — 97535 SELF CARE MNGMENT TRAINING: CPT

## 2023-02-25 PROCEDURE — C9113 INJ PANTOPRAZOLE SODIUM, VIA: HCPCS | Performed by: INTERNAL MEDICINE

## 2023-02-25 PROCEDURE — 94760 N-INVAS EAR/PLS OXIMETRY 1: CPT

## 2023-02-25 PROCEDURE — 85025 COMPLETE CBC W/AUTO DIFF WBC: CPT

## 2023-02-25 PROCEDURE — A4216 STERILE WATER/SALINE, 10 ML: HCPCS | Performed by: INTERNAL MEDICINE

## 2023-02-25 PROCEDURE — 80048 BASIC METABOLIC PNL TOTAL CA: CPT

## 2023-02-25 PROCEDURE — 83735 ASSAY OF MAGNESIUM: CPT

## 2023-02-25 PROCEDURE — 85610 PROTHROMBIN TIME: CPT

## 2023-02-25 PROCEDURE — 1180000000 HC REHAB R&B

## 2023-02-25 PROCEDURE — 99232 SBSQ HOSP IP/OBS MODERATE 35: CPT | Performed by: PHYSICAL MEDICINE & REHABILITATION

## 2023-02-25 RX ORDER — ACETAMINOPHEN 325 MG/1
650 TABLET ORAL EVERY 4 HOURS PRN
Status: DISCONTINUED | OUTPATIENT
Start: 2023-02-25 | End: 2023-03-13 | Stop reason: HOSPADM

## 2023-02-25 RX ORDER — OXYCODONE HYDROCHLORIDE 5 MG/1
10 TABLET ORAL EVERY 4 HOURS PRN
Status: CANCELLED | OUTPATIENT
Start: 2023-02-25

## 2023-02-25 RX ORDER — POTASSIUM CHLORIDE 20 MEQ/1
40 TABLET, EXTENDED RELEASE ORAL ONCE
Status: COMPLETED | OUTPATIENT
Start: 2023-02-25 | End: 2023-02-25

## 2023-02-25 RX ORDER — ASPIRIN 81 MG/1
81 TABLET ORAL DAILY
Status: CANCELLED | OUTPATIENT
Start: 2023-02-26

## 2023-02-25 RX ORDER — WARFARIN SODIUM 2 MG/1
4 TABLET ORAL
Status: CANCELLED | OUTPATIENT
Start: 2023-02-25

## 2023-02-25 RX ORDER — WARFARIN SODIUM 2 MG/1
4 TABLET ORAL
Status: DISCONTINUED | OUTPATIENT
Start: 2023-02-25 | End: 2023-02-26

## 2023-02-25 RX ORDER — ACETAMINOPHEN 325 MG/1
650 TABLET ORAL EVERY 4 HOURS PRN
Status: CANCELLED | OUTPATIENT
Start: 2023-02-25

## 2023-02-25 RX ORDER — ROSUVASTATIN CALCIUM 10 MG/1
10 TABLET, COATED ORAL DAILY
Status: CANCELLED | OUTPATIENT
Start: 2023-02-26

## 2023-02-25 RX ORDER — OXYCODONE HYDROCHLORIDE 5 MG/1
5 TABLET ORAL EVERY 4 HOURS PRN
Status: DISCONTINUED | OUTPATIENT
Start: 2023-02-25 | End: 2023-03-13 | Stop reason: HOSPADM

## 2023-02-25 RX ORDER — POTASSIUM CHLORIDE 20 MEQ/1
40 TABLET, EXTENDED RELEASE ORAL ONCE
Status: DISCONTINUED | OUTPATIENT
Start: 2023-02-25 | End: 2023-02-25 | Stop reason: HOSPADM

## 2023-02-25 RX ORDER — ASPIRIN 81 MG/1
81 TABLET ORAL DAILY
Status: DISCONTINUED | OUTPATIENT
Start: 2023-02-26 | End: 2023-03-13 | Stop reason: HOSPADM

## 2023-02-25 RX ORDER — FUROSEMIDE 20 MG/1
20 TABLET ORAL DAILY
Status: DISCONTINUED | OUTPATIENT
Start: 2023-02-26 | End: 2023-03-13 | Stop reason: HOSPADM

## 2023-02-25 RX ORDER — ENOXAPARIN SODIUM 150 MG/ML
1 INJECTION SUBCUTANEOUS 2 TIMES DAILY
Status: CANCELLED | OUTPATIENT
Start: 2023-02-25

## 2023-02-25 RX ORDER — MECOBALAMIN 5000 MCG
5 TABLET,DISINTEGRATING ORAL NIGHTLY
Status: CANCELLED | OUTPATIENT
Start: 2023-02-25

## 2023-02-25 RX ORDER — IPRATROPIUM BROMIDE AND ALBUTEROL SULFATE 2.5; .5 MG/3ML; MG/3ML
1 SOLUTION RESPIRATORY (INHALATION) EVERY 4 HOURS PRN
Status: CANCELLED | OUTPATIENT
Start: 2023-02-25

## 2023-02-25 RX ORDER — ENOXAPARIN SODIUM 150 MG/ML
1 INJECTION SUBCUTANEOUS 2 TIMES DAILY
Status: DISCONTINUED | OUTPATIENT
Start: 2023-02-25 | End: 2023-02-26

## 2023-02-25 RX ORDER — ONDANSETRON 4 MG/1
4 TABLET, ORALLY DISINTEGRATING ORAL EVERY 8 HOURS PRN
Status: DISCONTINUED | OUTPATIENT
Start: 2023-02-25 | End: 2023-03-13 | Stop reason: HOSPADM

## 2023-02-25 RX ORDER — ONDANSETRON 2 MG/ML
4 INJECTION INTRAMUSCULAR; INTRAVENOUS EVERY 6 HOURS PRN
Status: CANCELLED | OUTPATIENT
Start: 2023-02-25

## 2023-02-25 RX ORDER — ONDANSETRON 2 MG/ML
4 INJECTION INTRAMUSCULAR; INTRAVENOUS EVERY 6 HOURS PRN
Status: DISCONTINUED | OUTPATIENT
Start: 2023-02-25 | End: 2023-03-13 | Stop reason: HOSPADM

## 2023-02-25 RX ORDER — WARFARIN SODIUM 5 MG/1
5 TABLET ORAL
Status: DISCONTINUED | OUTPATIENT
Start: 2023-02-27 | End: 2023-02-26

## 2023-02-25 RX ORDER — ROSUVASTATIN CALCIUM 5 MG/1
10 TABLET, COATED ORAL DAILY
Status: DISCONTINUED | OUTPATIENT
Start: 2023-02-26 | End: 2023-03-13 | Stop reason: HOSPADM

## 2023-02-25 RX ORDER — GUAIFENESIN 600 MG/1
600 TABLET, EXTENDED RELEASE ORAL 2 TIMES DAILY
Status: DISCONTINUED | OUTPATIENT
Start: 2023-02-25 | End: 2023-03-13 | Stop reason: HOSPADM

## 2023-02-25 RX ORDER — OXYCODONE HYDROCHLORIDE 5 MG/1
10 TABLET ORAL EVERY 4 HOURS PRN
Status: DISCONTINUED | OUTPATIENT
Start: 2023-02-25 | End: 2023-03-13 | Stop reason: HOSPADM

## 2023-02-25 RX ORDER — IPRATROPIUM BROMIDE AND ALBUTEROL SULFATE 2.5; .5 MG/3ML; MG/3ML
1 SOLUTION RESPIRATORY (INHALATION) EVERY 4 HOURS PRN
Status: DISCONTINUED | OUTPATIENT
Start: 2023-02-25 | End: 2023-03-13 | Stop reason: HOSPADM

## 2023-02-25 RX ORDER — SPIRONOLACTONE 25 MG/1
25 TABLET ORAL DAILY
Status: DISCONTINUED | OUTPATIENT
Start: 2023-02-25 | End: 2023-02-25 | Stop reason: HOSPADM

## 2023-02-25 RX ORDER — OXYCODONE HYDROCHLORIDE 5 MG/1
5 TABLET ORAL EVERY 4 HOURS PRN
Status: CANCELLED | OUTPATIENT
Start: 2023-02-25

## 2023-02-25 RX ORDER — ONDANSETRON 4 MG/1
4 TABLET, ORALLY DISINTEGRATING ORAL EVERY 8 HOURS PRN
Status: CANCELLED | OUTPATIENT
Start: 2023-02-25

## 2023-02-25 RX ORDER — GUAIFENESIN 600 MG/1
600 TABLET, EXTENDED RELEASE ORAL 2 TIMES DAILY
Status: CANCELLED | OUTPATIENT
Start: 2023-02-25

## 2023-02-25 RX ORDER — MECOBALAMIN 5000 MCG
5 TABLET,DISINTEGRATING ORAL NIGHTLY
Status: DISCONTINUED | OUTPATIENT
Start: 2023-02-25 | End: 2023-03-13 | Stop reason: HOSPADM

## 2023-02-25 RX ORDER — WARFARIN SODIUM 5 MG/1
5 TABLET ORAL
Status: CANCELLED | OUTPATIENT
Start: 2023-02-27

## 2023-02-25 RX ORDER — FUROSEMIDE 20 MG/1
20 TABLET ORAL DAILY
Status: CANCELLED | OUTPATIENT
Start: 2023-02-26

## 2023-02-25 RX ORDER — ENOXAPARIN SODIUM 150 MG/ML
1 INJECTION SUBCUTANEOUS 2 TIMES DAILY
Status: DISCONTINUED | OUTPATIENT
Start: 2023-02-25 | End: 2023-02-25 | Stop reason: HOSPADM

## 2023-02-25 RX ADMIN — METOPROLOL TARTRATE 25 MG: 25 TABLET, FILM COATED ORAL at 09:23

## 2023-02-25 RX ADMIN — POTASSIUM CHLORIDE 40 MEQ: 1500 TABLET, EXTENDED RELEASE ORAL at 18:08

## 2023-02-25 RX ADMIN — MEROPENEM 1000 MG: 1 INJECTION, POWDER, FOR SOLUTION INTRAVENOUS at 09:21

## 2023-02-25 RX ADMIN — OXYCODONE 10 MG: 5 TABLET ORAL at 20:52

## 2023-02-25 RX ADMIN — METOPROLOL TARTRATE 25 MG: 25 TABLET, FILM COATED ORAL at 20:53

## 2023-02-25 RX ADMIN — ROSUVASTATIN CALCIUM 10 MG: 10 TABLET, FILM COATED ORAL at 09:23

## 2023-02-25 RX ADMIN — OXYCODONE 10 MG: 5 TABLET ORAL at 14:02

## 2023-02-25 RX ADMIN — SODIUM CHLORIDE, PRESERVATIVE FREE 40 MG: 5 INJECTION INTRAVENOUS at 05:43

## 2023-02-25 RX ADMIN — NALOXEGOL OXALATE 25 MG: 12.5 TABLET, FILM COATED ORAL at 09:24

## 2023-02-25 RX ADMIN — ASPIRIN 81 MG: 81 TABLET, COATED ORAL at 09:23

## 2023-02-25 RX ADMIN — GUAIFENESIN 600 MG: 600 TABLET ORAL at 20:53

## 2023-02-25 RX ADMIN — MEROPENEM 1000 MG: 1 INJECTION, POWDER, FOR SOLUTION INTRAVENOUS at 23:39

## 2023-02-25 RX ADMIN — FUROSEMIDE 20 MG: 20 TABLET ORAL at 09:23

## 2023-02-25 RX ADMIN — WARFARIN SODIUM 4 MG: 2 TABLET ORAL at 18:08

## 2023-02-25 RX ADMIN — ENOXAPARIN SODIUM 105 MG: 120 INJECTION SUBCUTANEOUS at 09:24

## 2023-02-25 RX ADMIN — IPRATROPIUM BROMIDE AND ALBUTEROL SULFATE 1 AMPULE: .5; 2.5 SOLUTION RESPIRATORY (INHALATION) at 19:28

## 2023-02-25 RX ADMIN — GUAIFENESIN 600 MG: 600 TABLET ORAL at 09:23

## 2023-02-25 RX ADMIN — Medication 5 MG: at 20:53

## 2023-02-25 RX ADMIN — ENOXAPARIN SODIUM 120 MG: 120 INJECTION SUBCUTANEOUS at 23:39

## 2023-02-25 RX ADMIN — ACETAMINOPHEN 650 MG: 325 TABLET ORAL at 06:36

## 2023-02-25 ASSESSMENT — PAIN DESCRIPTION - ORIENTATION
ORIENTATION: LEFT

## 2023-02-25 ASSESSMENT — PAIN DESCRIPTION - DESCRIPTORS
DESCRIPTORS: ACHING
DESCRIPTORS: ACHING

## 2023-02-25 ASSESSMENT — PAIN DESCRIPTION - LOCATION
LOCATION: ARM
LOCATION: ARM
LOCATION: SHOULDER
LOCATION: SHOULDER

## 2023-02-25 ASSESSMENT — PAIN SCALES - GENERAL
PAINLEVEL_OUTOF10: 0
PAINLEVEL_OUTOF10: 7
PAINLEVEL_OUTOF10: 7
PAINLEVEL_OUTOF10: 4
PAINLEVEL_OUTOF10: 7

## 2023-02-25 ASSESSMENT — PAIN - FUNCTIONAL ASSESSMENT: PAIN_FUNCTIONAL_ASSESSMENT: PREVENTS OR INTERFERES SOME ACTIVE ACTIVITIES AND ADLS

## 2023-02-25 NOTE — PROGRESS NOTES
Subjective: The patient complains of severe acute on chronic progressive fatigue and on exertion relieved with rest partially relieved by rest, PT, OT and meds IV Merrem and O2 and exacerbated by exertion and recent illness. Patient has been struggling with pulmonary problems related to community-acquired pneumonia. He had previously been to [facility] and was admitted now to [facility] since 2/18/2023. He is currently on IV Merrem. 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 *      .    Reviewed recent nursing note and discussed current status and planned care with acute care providers, \"Shift assessment completed and medications given per [facility] Chest tube drained and 225 ml removed. VSS and alert and oriented. No pain or discomfort at this time. Call light within reach and bed in lowest position. Patient's family at bedside. Will continue to monitor \". ROS x10: The patient also complains of severely impaired mobility and activities of daily living. Otherwise no new problems with vision, hearing, nose, mouth, throat, dermal, cardiovascular, GI, , pulmonary, musculoskeletal, psychiatric or neurological.        Vital signs:  BP (!) 118/58   Pulse 69   Temp 98.1 °F (36.7 °C)   Resp 18   Ht 5' 7\" (1.702 m)   Wt 243 lb (110.2 kg)   SpO2 98%   BMI 38.06 kg/m²   I/O:   PO/Intake:    fair PO intake, monitoring for dysphagia    Bowel/Bladder:   continent,    General:  Patient is well developed, adequately nourished, and    well kempt. HEENT:    PERRLA, hearing intact to loud voice, external inspection of ear and nose benign.   Inspection of lips, tongue and gums benign  Musculoskeletal: No significant change in strength or tone. All joints stable. Inspection and palpation of digits and nails show no clubbing, cyanosis or inflammatory conditions. Neuro/Psychiatric: Affect: flat-  Alert and oriented to self and situation with  no needed cues. No significant change in deep tendon reflexes or sensation  Lungs:  Diminished, CTA-B  . Respiration effort is normal at rest.   Heart:   S1 = S2,   RRR. Abdomen:  Obese, Soft, non-tender    Extremities:  Trace  lower extremity edema but no unusual tenderness. Skin:   BUE bruises dt blood draws      Rehabilitation:  Physical Therapy:   Bed mobility:  Bed mobility  Rolling to Left: Stand by assistance (02/23/23 1336)  Rolling to Right: Stand by assistance (02/23/23 1336)  Supine to Sit: Moderate assistance (assist for trunk required) (02/23/23 1336)  Scooting: Stand by assistance (along edge of bed) (02/23/23 1336)  Bed Mobility Comments: HOB flat  - intermittent use of rails required (02/23/23 1336)  Transfers:  Transfers  Sit to Stand: Minimal Assistance; Moderate Assistance (heavy pull on rail in toilet transfer noted) (02/23/23 1337)  Stand to Sit: Minimal Assistance; Moderate Assistance (02/23/23 1337)  Comment: multiple trials with varying assistance (02/23/23 1337)  Gait:   Ambulation  Surface: Level tile (02/23/23 1340)  Device: Rolling Walker (02/23/23 1340)  Other Apparatus: O2 (02/23/23 1340)  Assistance: Minimal assistance (02/23/23 1340)  Quality of Gait: forward flexed trunk, heavy UE use and support, short step length, knee stability fair, SOB following  - 2 L O2 in place with at  95% (02/23/23 1340)  Distance: 25 feet x 2 (02/23/23 1340)  Stairs:  Stairs/Curb  Stairs?: No (02/23/23 1340)  W/C mobility:       Occupational Therapy:   Hand Dominance: Right  ADL  Feeding: Unable to assess(comment) (02/23/23 1348)  Grooming: Stand by assistance; Increased time to complete (02/23/23 1348)  UE Bathing: Minimal assistance; Increased time to complete (02/23/23 1348)  LE Bathing: Moderate assistance; Increased time to complete (02/23/23 1348)  UE Dressing: Minimal assistance; Increased time to complete (02/23/23 1348)  LE Dressing: Maximum assistance; Increased time to complete (02/23/23 1348)  Toileting: Maximum assistance; Increased time to complete (02/23/23 1348)  Toilet Transfers  Toilet - Technique: Ambulating (02/23/23 1350)  Equipment Used: Grab bars (02/23/23 1350)  Toilet Transfer: Minimal assistance (02/23/23 1350)          Speech Therapy:            Diet/Swallow:                   COGNITION  OT: Cognition Comment: follows commands consistently  SP:           Lab/X-ray studies reviewed, analyzed and discussed with patient and staff:   Recent Results (from the past 24 hour(s))   Hemoglobin and Hematocrit    Collection Time: 02/24/23 11:41 AM   Result Value Ref Range    Hemoglobin 7.2 (L) 14.0 - 18.0 g/dL    Hematocrit 21.7 (L) 42.0 - 52.0 %   Hemoglobin and Hematocrit    Collection Time: 02/24/23  5:47 PM   Result Value Ref Range    Hemoglobin 7.4 (L) 14.0 - 18.0 g/dL    Hematocrit 22.6 (L) 42.0 - 52.0 %   Hemoglobin and Hematocrit    Collection Time: 02/24/23 11:33 PM   Result Value Ref Range    Hemoglobin 7.2 (L) 14.0 - 18.0 g/dL    Hematocrit 20.9 (LL) 42.0 - 52.0 %   Basic Metabolic Panel w/ Reflex to MG    Collection Time: 02/25/23  5:16 AM   Result Value Ref Range    Sodium 142 135 - 144 mEq/L    Potassium reflex Magnesium 3.3 (L) 3.4 - 4.9 mEq/L    Chloride 108 (H) 95 - 107 mEq/L    CO2 22 20 - 31 mEq/L    Anion Gap 12 9 - 15 mEq/L    Glucose 97 70 - 99 mg/dL    BUN 22 8 - 23 mg/dL    Creatinine 1.31 (H) 0.70 - 1.20 mg/dL    Est, Glom Filt Rate 53.1 (L) >60    Calcium 8.0 (L) 8.5 - 9.9 mg/dL   Protime-INR    Collection Time: 02/25/23  5:16 AM   Result Value Ref Range    Protime 22.0 (H) 12.3 - 14.9 sec    INR 1.9    Magnesium    Collection Time: 02/25/23  5:16 AM   Result Value Ref Range Magnesium 2.2 1.7 - 2.4 mg/dL     Previous extensive, complex labs, notes and diagnostics reviewed and analyzed. ALLERGIES:    Allergies as of 02/17/2023 - Fully Reviewed 02/16/2023   Allergen Reaction Noted    Benadryl [diphenhydramine hcl] Anxiety 01/31/2012    Diphenhydramine Anxiety 05/09/2018      (please also verify by checking [facility])     Complex Physical Medicine & Rehab Issues Assess & Plan:   Severe abnormality of gait and mobility and impaired self-care and ADL's secondary to   pulmonary debility with history of mesothelioma and recent pneumonia. Updated functional and medical status reassessed regarding patients ability to participate in therapies and patient found to be able to participate in:            acute intensive comprehensive inpatient rehabilitation program including PT/OT to improve balance, ambulation, ADLs, and to improve the P/AROM. It is my opinion that they will be able to tolerate 3 hours of therapy a day and benefit from it at an acute level. I again discussed acute rehab with the patient and verify that the patient is able and willing to participate in 3 hours of therapy a day. Rehab and Acute Care Case Management has also reinforced this expectation. Will continue to follow to attempt to get patient to the most efficient but most effective level of care will be in their best interest.  Continue to focus on energy conservation heart rate and blood pressure monitoring before during and after therapy endurance and consistency of function. Bowel constipation   and Bladder dysfunction   overactive, neurogenic bladder:  frequent toileting, ambulate to bathroom with assistance, check post void residuals.   Check for C.difficile x1 if >2 loose stools in 24 hours, continue bowel & bladder program.  Monitor for UTI symptoms including lethargy and confusion    Severe  low back pain and generalized OA pain: reassess pain every shift and prior to and after each therapy session, give prn Tylenol   and consider scheduled Tylenol, modalities prn in therapy, consider Lidoderm, K-pad prn. Skin healing    breakdown   risk:  continue pressure relief program.  Daily skin exams and reports from nursing. Severe fatigue due to immobility and nutritional deficits: monitoring for dysphagia   Add vitamin B12 vitamin D and CoQ10 titrate dosing and add protein supplementation with low carb content. Complex discharge planning:   Discussed with care team-last 24 hour events noted. I will continue to follow along and reassess functional and medical status as we strive to improve patient's functional and medical outcomes progressing to the most efficient and lowest level of care. Complex Active General Medical Issues that complicate care:     1. Principal Problem:    Pleural effusion  Active Problems:    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[facility]    Melena    Gastritis without bleeding    Impaired mobility and activities of daily living    Atrial fibrillation (HCC)    Lumbar stenosis with neurogenic claudication    Congestive heart failure ([address]  Resolved Problems:    * No resolved hospital problems. *          Events and functional changes in the past 24 hours reviewed improvements in functional status are encouraging       Focus of today's plan-  Focus on balancing physical with emotional rehabilitation. Involve rehabilitation psychology and spiritual care if patient is agreeable.         James Devlin D.O., PM&R     Attending    [address]

## 2023-02-25 NOTE — PROGRESS NOTES
Infectious Disease Progress Note       2/24/2023    Patient is a followup regarding  Loculated exudative pleural effusion in the setting of mesothelioma  - has CONS in fluid - currently on meropenem and vancomycin. Going down for CT chest.   Chronic pain with spinal stimulator  CSF leak recently  Sick sinus syndrome status post pacemaker  Hx of Polycythemia      56-year-old male who was at [facility] for a possible nerve stimulator placement. When the procedure was initiated patient had a possible CSF leak and the procedure was aborted per MR  Postoperatively patient developed worsening shortness of breath and was brought to [facility] for repeat evaluation he is found to have a large loculated pleural effusion. Patient was started on empiric antibiotic therapy at [facility] ICU and transferred to [facility] for thoracotomy/VATS decortication. Had fluid culture that was positive S epi from pleural effusion. He is s/p Pleurex cath placement. Started on Vanco and Meropenem empirically. Vanco discontinued after speaking to micro lab regarding S epi - only one bottle positive but nothing on gram stain or plate or the other bottle - it took two days to grow. Spoke to the rest of the team and his wife - In addition to the mesothelioma, which is liekly the predominant factor, the CT shows possible superimposed pneumonia. Meropenem will be continued x 10 days course. Will plan to reculture the pleural fluid at least 24 hours after stopping the Vanco.     Subjectively, no new complaints at this time. Resting.      Lab Results   Component Value Date    WBC 7.0 02/24/2023    HGB 7.4 (L) 02/24/2023    HCT 22.6 (L) 02/24/2023    MCV 88.7 02/24/2023     02/24/2023     Lab Results   Component Value Date/Time     02/24/2023 04:31 AM    K 3.5 02/24/2023 04:31 AM     02/24/2023 04:31 AM    CO2 23 02/24/2023 04:31 AM    BUN 24 02/24/2023 04:31 AM    CREATININE 1.35 02/24/2023 04:31 AM    GLUCOSE 110 02/24/2023 04:31 AM    GLUCOSE 85 04/25/2012 01:30 PM    CALCIUM 7.9 02/24/2023 04:31 AM        WBC trends are being monitored. Antibiotic doses are being adjusted per most recent renal labs. Vitals:    02/24/23 1412   BP: (!) 103/53   Pulse: 64   Resp: 18   Temp: 98.2 °F (36.8 °C)   SpO2: 94%           Patient Active Problem List   Diagnosis    Ventral hernia    Ventral hernia, recurrent    Polycythemia    Spinal stenosis of lumbar region with neurogenic claudication    Atherosclerosis of native artery of both lower extremities with intermittent claudication (HCC)    Atrial fibrillation (HCC)    BPH with obstruction/lower urinary tract symptoms    Congenital cystic kidney disease    Venous insufficiency    Intermittent claudication (HCC)    Transient ischemic attack    Sleep apnea    Rosacea    Fuchs' corneal dystrophy    Excessive daytime sleepiness    Hx of blood clots    Former smoker    Lumbar stenosis with neurogenic claudication    Congestive heart failure (HCC)    Lumbar spondylosis    Balance disorder    Postlaminectomy syndrome, lumbar region    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   Chronic pain with spinal stimulator  CSF leak recently  Sick sinus syndrome status post pacemaker  Hx of Polycythemia    Repeat pleural fluid culture at least 24 hours after the Vanco stopped.    Meropenem x total 10 days    Imaging and labs were reviewed per medical records and any ID pertinent labs were also addressed        Pearl Jeffery, DO

## 2023-02-25 NOTE — PROGRESS NOTES
Cardiology Progress Note      Date:   2/25/2023  Patient name:  Eliana Gayle  Date of admission:  2/18/2023  3:39 AM  MRN:   [id number]  YOB: 1935  Time of Consult:  2:55 PM    Rounding Cardiologist: Chica Alfaro MD  Primary Cardiologist:  Dr. Gabby Bonilla    Referring Provider: Dr. Monica Ewing MD    Chart reviewed, patient examined at the bedside. Wife is at the bedside as well. He has conversational shortness of breath. BMI is excessive. There is abdominal obesity. Hemodynamics stable blood pressure 304Z systolic heart rate 58X. Unable to assess jugular venous distention. Pleurx catheter in place on the left side, apparently yesterday 225 cc was drained. Breath sounds are very diminished throughout both posterior lung fields, with scattered crepitations and wheezes. Pertinent laboratory data from today chronic kidney disease stage III with improvement in GFR from 40s to 50s, hypokalemia potassium of 3.3, severe anemia, subtherapeutic INR. Iron studies suggest iron deficiency anemia. Telemetry shows atrial fibrillation with ventricular pacing  Laboratory data show hypokalemia. CKD stage III GFR in the 50s      Echo: As noted below preserved LV systolic function without significant valvular disease. There is a moderate-sized pericardial effusion without hemodynamic evidence of tamponade    Impression( per  note of 2/24/22 )  Patient with moderate-sized pericardial effusion with history of malignancy. The effusion is not hemodynamically significant at this time. There is pericardial thickening around the RV free wall. This could be a sympathetic effusion secondary to the very large left pleural effusion that was recently decompressed. Could be viral.  And that it is a new finding would repeat echo in 3 days this coming Monday to assure no progression. He could still go to rehab.       Additional assessment per me today is that this patient has shortness of breath at rest, he is hardly moving any air, his breath sounds are very distant, his BMI is excessive, he does not have tamponade physiology on examination, and he is hypokalemic. Plan is to move him to rehabilitation, from a cardiac perspective do not see any contraindication, aggressive pulmonary toileting may be beneficial.  We will replete potassium or add Aldactone to current regimen. Patient remains on oxygen at this time. Remains on Lovenox, warfarin has been started. Consider IV iron. Lady Chante MD        Consult HPI:   Jered Whitlock is a 82 y.o.  male patient who is being at the request of Dr. Mir Swain for inpatient consultation of pericardial effusion. He was admitted on 2023. Previous [facility] and [address] [facility] records have been reviewed in detail. 82 yr old male with a PMH of atrial fibrillation with long term anticoagulation, TIA, HTN, HLD, sinus node dysfunction with PPM, CAD with remote stenting, intermittent claudication, chronic shortness of breath, BPH, Polycythemia, traumatic pneumothorax s/p fall 2022, recent diagnosis of mesothelioma that was transferred from [facility] for possible thoracotomy/VATS decortication with Dr. Mir Swain for large loculated pleural effusion. Chest x-ray on admission showed near complete whiteout of the left hemithorax likely combination of partial collapse of the left lung and large pleural effusion, cardiomegaly. Abnormal labs today:  Bun/creat 27/1.37, GFR 80.3, Hgb/hct 7.3/22.7, INR 1.4. He is currently resting in bed with his wife at bedside. He states other than being fatigued and weak he feels okay. He denies any chest pain, chest pressure, chest tightness lightheadedness or dizziness. He does admit to chronic shortness of breath that he feels is stable.       Cardiac History/Testin2022 PPM: [employer] XT DR GANDHI [id number]  5/3/2022 FUAD:  LVEF 55-60%, No atrial thrombus noted   3/8/2022 ECHO:  Normal LVEF, 1 + AR,   5/3/2022 Cardioversion  Remote coronary PTCA    [facility] Medication List 11/22/2023  Medication Name Instruction   Aspirin 81 MG TABS TAKE 1 TAB DAILY   Doxepin HCl - 3 MG Oral Tablet TAKE 1 TABLET BY MOUTH AT BEDTIME AS NEEDED (INSOMNIA). Fish Oil 1200 MG Oral Capsule take 2 caps daily   Furosemide 20 MG Oral Tablet TAKE 1 TABLET DAILY. Losartan Potassium 100 MG Oral Tablet TAKE 1 TABLET BY MOUTH EVERY DAY   Meloxicam 15 MG Oral Tablet TAKE 1 TABLET DAILY AS NEEDED. Metoprolol Tartrate 50 MG Oral Tablet TAKE 1/2 TABLET ONCE A DAY. Nitroglycerin 0.4 MG Sublingual Tablet Sublingual USE AS NEEDED FOR CHEST PAIN   Potassium Chloride Sally ER 20 MEQ Oral Tablet Extended Release TAKE 1 TABLET DAILY. Rosuvastatin Calcium 10 MG Oral Tablet TAKE 1 TABLET BY MOUTH EVERY DAY   Vitamin D (Ergocalciferol) 1.25 MG (85268 UT) Oral Capsule TAKE 1 CAPSULE WEEKLY. Warfarin Sodium 4 MG Oral Tablet TAKE AS DIRECTED [address] [address]         Allergies:   Allergies   Allergen Reactions    Benadryl [Diphenhydramine Hcl] Anxiety    Diphenhydramine Anxiety     anxious       Past Medical History:  Past Medical History:   Diagnosis Date    A-fib ([facility] [address]     approx 1 year ago-cardioverted    Arthritis     Baker's cyst of knee, left     Cancer ([facility] [address]     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    Cerebral artery occlusion with cerebral infarction ([facility] [address] 2000    TIA sx felt funny --     Congestive heart failure ([facility] [address] 03/08/2022    Coronary artery disease     HTN (hypertension)     meds > 20 yrs    Hx of blood clots 2012    DVT left leg     Hyperlipidemia     meds > 20 yrs    Pacemaker 07/19/2022    Polycythemia     Torn meniscus     left knee       Past Surgical History:  Past Surgical History:   Procedure Laterality Date    BACK SURGERY      COLONOSCOPY  2009    COLONOSCOPY  2012    CORONARY ANGIOPLASTY WITH STENT PLACEMENT  10/2006    x 1 cardiac stent    ENDOSCOPY, COLON, DIAGNOSTIC      EYE SURGERY      Phaco with IOL OU HERNIA REPAIR  2013    redo ca mesh in TRT.9851 -- umbilical hernia    JOINT REPLACEMENT Bilateral  &     Bilateral TKR    KNEE SURGERY Left      arthroscopic surgery to repair meniscus of left knee    LAMINECTOMY N/A 2021    RIGHT BILATERAL L2-3 3-4 4-5 MICRODECOMPRESSION performed by Mari Guevara MD at [facility] 22    PAIN MANAGEMENT PROCEDURE N/A 2023    ATTEMPTED SPINAL CORD STIMULATOR PERMANENT PLACEMENT performed by Catie Estrada MD at [address] N/A 1/10/2023    SPINAL CORD STIMULATOR TRIAL performed by Catie Estrada MD at [place] [address]  age 8    UMBILICAL HERNIA REPAIR  2012    UPPER GASTROINTESTINAL ENDOSCOPY N/A 2023    EGD DIAGNOSTIC ONLY performed by Alex Law MD at [facility]       Family History:       Problem Relation Age of Onset    Heart Attack Mother     Other Mother          in MVA    Other Father          at age 80    Other Sister          as infant    Cancer Brother     Other Brother         unknown medical hx    Other Brother          at age 61 due to accident    Cancer Daughter         colon & lung cancer    Colon Cancer Daughter     No Known Problems Son     Colon Cancer Other     Breast Cancer Other        Social  History:     Social History     Tobacco Use    Smoking status: Former     Packs/day: 0.50     Years: 10.00     Pack years: 5.00     Types: Cigarettes     Quit date: 1968     Years since quittin.8    Smokeless tobacco: Never   Substance Use Topics    Alcohol use: Yes     Comment: social       Home Medications:    Prior to Admission medications    Medication Sig Start Date End Date Taking?  Authorizing Provider   carboxymethylcellulose (THERATEARS) 1 % ophthalmic gel Apply to eye    Historical Provider, MD   melatonin 5 MG TABS tablet Take by mouth    Historical Provider, MD   enoxaparin (LOVENOX) 100 MG/ML Inject into the skin 2 times daily    Historical Provider, MD   metoprolol tartrate (LOPRESSOR) 50 MG tablet Take 25 mg by mouth 2 times daily    Historical Provider, MD   Elastic Bandages & Supports ([facility]) [address] 1 each by Does not apply route daily Thigh high 20-30 mmhg graduated compression stockings both legs wear daily during day and off Qhs. Wear as tolerated. Do not wear if they cause increased pain. 8/25/22   John Macias, APRN - CNP   losartan (COZAAR) 100 MG tablet Take 100 mg by mouth in the morning. Historical Provider, MD   meloxicam (MOBIC) 15 MG tablet Take 15 mg by mouth in the morning. Patient not taking: Reported on 2/18/2023    Historical Provider, MD   potassium chloride (KLOR-CON M) 20 MEQ extended release tablet Take 20 mEq by mouth in the morning and 20 mEq before bedtime. Historical Provider, MD   rosuvastatin (CRESTOR) 10 MG tablet TAKE 1 TABLET BY MOUTH EVERY DAY 2/24/22   Historical Provider, MD   furosemide (LASIX) 20 MG tablet Take 20 mg by mouth daily    Historical Provider, MD   warfarin (COUMADIN) 2.5 MG tablet Take 4 mg by mouth daily Indications: T-Th-Sat - Sun Daily per INR levels    Historical Provider, MD   nitroGLYCERIN (NITROSTAT) 0.4 MG SL tablet Place 0.4 mg under the tongue every 5 minutes as needed for Chest pain up to max of 3 total doses. If no relief after 1 dose, call 911. Historical Provider, MD   Omega-3 Fatty Acids (FISH OIL) 1200 MG CAPS Take by mouth daily    Historical Provider, MD   Cholecalciferol (VITAMIN D3) 125 MCG (5000 UT) TABS Take by mouth daily    Historical Provider, MD   CPAP Machine MISC by Does not apply route    Historical Provider, MD   warfarin (COUMADIN) 5 MG tablet Take 5 mg by mouth daily Indications: Wlvoot-Swpchnhcq-Tmgfej Daily per INR levels 2/26/13   Historical Provider, MD   aspirin 81 MG EC tablet Take 81 mg by mouth daily.       Historical Provider, MD       Current Medications:      IV Medications:  Current Facility-Administered Medications   Medication Dose Route Frequency Provider Last Rate Last Admin    enoxaparin (LOVENOX) injection 105 mg  1 mg/kg SubCUTAneous BID Gordon Enciso MD   105 mg at 02/25/23 9777    warfarin (COUMADIN) tablet 4 mg  4 mg Oral Once per day on Sun Tue Thu Sat Gordon Enciso MD   4 mg at 02/23/23 1749    warfarin (COUMADIN) tablet 5 mg  5 mg Oral Once per day on Mon Wed Fri Gordon Enciso MD   5 mg at 02/24/23 1818    acetaminophen (TYLENOL) tablet 650 mg  650 mg Oral Q4H PRN Yolie Dee MD   650 mg at 02/25/23 0636    metoprolol tartrate (LOPRESSOR) tablet 25 mg  25 mg Oral BID Kayren Tristen Sedar, DO   25 mg at 02/25/23 8103    rosuvastatin (CRESTOR) tablet 10 mg  10 mg Oral Daily Kayren Tristen Sedar, DO   10 mg at 02/25/23 0441    [Held by provider] losartan (COZAAR) tablet 100 mg  100 mg Oral Daily Kayren Tristen Sedar, DO        furosemide (LASIX) tablet 20 mg  20 mg Oral Daily Kayren Tristen Sedar, DO   20 mg at 02/25/23 8348    aspirin EC tablet 81 mg  81 mg Oral Daily Agustin RYAN Sedar, DO   81 mg at 02/25/23 0093    pantoprazole (PROTONIX) 40 mg in sodium chloride (PF) 0.9 % 10 mL injection  40 mg IntraVENous Q12H Ryland Michaud MD   40 mg at 02/25/23 0543    ondansetron (ZOFRAN-ODT) disintegrating tablet 4 mg  4 mg Oral Q8H PRN Kayren Tristen Sedar, DO        Or    ondansetron (ZOFRAN) injection 4 mg  4 mg IntraVENous Q6H PRN Kayren Tristen Sedar, DO        meropenem (MERREM) 1,000 mg in sodium chloride 0.9 % 100 mL IVPB (mini-bag)  1,000 mg IntraVENous Q8H Agustin D Sedar, DO   Stopped at 02/25/23 0624    melatonin disintegrating tablet 5 mg  5 mg Oral Nightly Kayren Tristen Sedar, DO   5 mg at 02/24/23 2046    guaiFENesin [facility]) extended release tablet 600 mg  600 mg Oral BID Jj Ramon Sedar, DO   600 mg at 02/25/23 4796    ipratropium-albuterol (DUONEB) nebulizer solution 1 ampule  1 ampule Inhalation Q4H PRN Jj Ramon Sedar, DO        oxyCODONE (ROXICODONE) immediate release tablet 5 mg  5 mg Oral Q4H PRN Ryland Michaud MD   5 mg at 02/22/23 2029    Or    oxyCODONE (ROXICODONE) immediate release tablet 10 mg  10 mg Oral Q4H PRN Sami Ross MD   10 mg at 02/25/23 1402    naloxegol (MOVANTIK) tablet 25 mg  25 mg Oral QAM Sami Ross MD   25 mg at 02/25/23 1158     Facility-Administered Medications Ordered in Other Encounters   Medication Dose Route Frequency Provider Last Rate Last Admin    sodium chloride flush 0.9 % injection 10 mL  10 mL IntraVENous PRN [facility] Valentina Marcelino MD   10 mL at 07/10/19 1220       ROS:   12 point review of systems was obtained in detail and is negative other than that noted above or below. Review of Systems  Constitutional: No weight loss, fever, chills, weakness. Positive for  fatigue. HEENT: No visual loss, blurred vision, double vision or yellow sclerae. Skin: No rash or itching  Cardiovascular:  No chest pain, pressure or discomfort. No palpitations or edema. Respiratory:  Positive for chronic shortness of breath, denies cough or sputum. Gastrointestinal:  No anorexia, nausea, vomiting or diarrhea. No abdominal pain. No bloody or dark tarry stools. Neurological:  No headache, dizziness, syncope, paralysis, ataxia, numbness or tingling in the extremities. No change in bowel or bladder control. Musculoskeletal:  No muscle, back pain, joint pain or stiffness. Hematologic: No anemia, bleeding or bruising.         Vital Signs:  Vitals:    02/25/23 0559 02/25/23 0736 02/25/23 0923 02/25/23 1402   BP:  (!) 103/51 (!) 103/51    Pulse:  64 64    Resp:  18  18   Temp:  99 °F (37.2 °C)     TempSrc:  Oral     SpO2:  95%     Weight: 243 lb (110.2 kg)      Height:           Intake/Output Summary (Last 24 hours) at 2/25/2023 1455  Last data filed at 2/25/2023 0026  Gross per 24 hour   Intake 120 ml   Output 405 ml   Net -285 ml         Patient Vitals for the past 96 hrs (Last 3 readings):   Weight   02/25/23 0559 243 lb (110.2 kg)         Physical Examination:  Constitutional:  awake/alert/oriented x3, no distress, alert and cooperative. Morbidly obese. Eyes:  PERRL, sclera clear, conjunctiva pink. EMMT:  mucous membranes  moist, no apparent injury, no lesion seen. Head/Neck:  Neck supple, no apparent injury, thyroid without mass or tenderness, No JVD, trachea midline, no bruits. Respiratory/Thorax: Diminished throughout  Cardiovascular: Distant heart sounds,  regular, rate and rhythm, no murmurs,  normal S1 and S2, PMI non displaced. Gastrointestinal:   distended, firm, non-tender, no rebound tenderness or guarding, Morbidly obese. Genitourinary:  deferred  Musculoskeletal:  No apparent injury. Extremities:  No cyanosis, bilateral lower extremity edema, contusions or wounds, no clubbing. DP 2+ equal bilaterally. Radial 2+ equal bilaterally. Neurological:  Alert and oriented x3. Moves extremities spontaneous with purpose  Psychological:  Appropriate mood and behavior  Skin:  Warm and dry,  no lesions or rashes. Diagnostics:    EKG:  Pending    ECHO:  Moderate circumferential pericardial effusion. Does not meet criteria for tamponade. IVC is mildly dilated but with normal respiratory variation. There is some resp variation of MV inflow but <25% and some resp variation   of RV inflow but <35%. There is no RV sys collapse   There is no Septal bounce   There is diastolic but not systolic collapse of right atrium. There is moderate pericardial thickening around the RV free wall. Normal left ventricle structure and function. Left ventricular ejection fraction is visually estimated at 55-60%. Normal diastlic functioni   Normal right ventricle structure and function. Normal right ventricle systolic pressure. Normal right atrium. No systolic collapse, early woodruff collapse which is non specific   Normal mitral valve structure and function. Normal aortic valve structure and function. Normal tricuspid valve structure and function. Telemetry:  No telemetry .     Lab Data:  BMP:  Recent Labs     02/23/23  0459 02/24/23  0431 02/25/23  0516    140 142   K 3.4 3.5 3.3*    108* 108*   CO2 20 23 22   BUN 27* 24* 22   CREATININE 1.37* 1.35* 1.31*   LABGLOM 50.3* 51.2* 53.1*         CBC:  Recent Labs     02/23/23  0459 02/24/23  0432 02/24/23  1141 02/24/23  2333 02/25/23  0516 02/25/23  1154   WBC 7.3 7.0  --   --  8.2  --    RBC 2.54* 2.29*  --   --  2.44*  --    HGB 7.3* 7.0*   < > 7.2* 7.0* 7.2*   HCT 22.7* 20.3*   < > 20.9* 21.4* 21.7*   MCV 89.6 88.7  --   --  87.5  --    MCH 28.9 30.5  --   --  28.8  --    MCHC 32.2* 34.4  --   --  32.9*  --    RDW 15.7* 15.7*  --   --  16.0*  --     200  --   --  233  --     < > = values in this interval not displayed.         Cardiac Enzymes:   No results for input(s): CKTOTAL, CKMB, TROPONINI in the last 72 hours.    Hepatic Function Panel:  No results for input(s): ALKPHOS, ALT, AST, PROT, BILITOT, BILIDIR, LABALBU in the last 72 hours.    Magnesium:  Recent Labs     02/25/23  0516   MG 2.2         Pro-BNP:  No results found for: PROBNP    INR:  Recent Labs     02/23/23  0459 02/24/23  0431 02/25/23  0516   PROTIME 17.7* 19.3* 22.0*   INR 1.4 1.6 1.9         TSH:  No results found for: TSH    Lipid Profile:  No results found for: TRIG, HDL, LDLCALC, LDLDIRECT, LABVLDL    HgbA1C:  No results found for: LABA1C    Lactate Level:  No results for input(s): LACTA in the last 72 hours.    CMP:  Recent Labs     02/23/23  0459 02/24/23  0431 02/25/23  0516    140 142   K 3.4 3.5 3.3*    108* 108*   CO2 20 23 22   BUN 27* 24* 22   CREATININE 1.37* 1.35* 1.31*   GLUCOSE 109* 110* 97   CALCIUM 8.2* 7.9* 8.0*         Amylase:  No results for input(s): AMYLASE in the last 72 hours.    Lipase:  No results for input(s): LIPASE in the last 72 hours.    ABG:  No results for input(s): PH, PO2, PCO2, HCO3, BE, O2SAT in the last 72 hours.      Radiology:   CT CHEST WO CONTRAST   Final Result   Indwelling chest tube identified on  the left with small left pleural effusion   and trace anterior pneumothorax. Consolidation seen in airspace disease   throughout the left upper and lower lobes to suggest a multifocal   superimposed pneumonia. Soft tissue density seen in the hilar region to   suggest possible reactive adenopathy. Moderate-sized pericardial effusion. Minimal atelectatic changes seen at the right lung base. Incidental stones   in the gallbladder with small hiatal hernia. US DUP UPPER EXTREMITY RIGHT VENOUS   Final Result   No evidence of DVT. XR CHEST PORTABLE   Final Result   1. Minimal partial clearing of the left lung. The previously noted left   pleural effusion is smaller   2. Stable position of the left chest tube   3. The right lung is clear. XR CHEST PORTABLE   Final Result   1. Apparent interval placement of left chest tube catheter. No pneumothorax. 2.  Persistent opacification of the left hemithorax. XR CHEST PORTABLE   Final Result   1. Near complete whiteout of the left hemithorax likely represent a   combination of partial collapse of the left lung and large pleural effusion   2. Cardiomegaly   3. The right lung is grossly clear. XR ABDOMEN (KUB) (SINGLE AP VIEW)   Final Result   1. Complete opacification of left hemithorax compatible with some   combination of pleural effusion and atelectasis. Secondary obscuration of   left heart border. 2.  Nonobstructive bowel gas pattern. No acute abdominal disease identified. 3.  Probable left renal stone.                Impression:   Principal Problem:    Pleural effusion  Active Problems:    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[address]    Melena    Gastritis without bleeding    Impaired mobility and activities of daily living    Pneumonia of both lungs due to infectious organism    Atrial fibrillation (HCC)    Lumbar stenosis with neurogenic claudication    Congestive heart failure ([address]  Resolved Problems:    * No resolved hospital problems. *    Patient Active Problem List   Diagnosis    Ventral hernia    Ventral hernia, recurrent    Polycythemia    Spinal stenosis of lumbar region with neurogenic claudication    Atherosclerosis of native artery of both lower extremities with intermittent claudication (HCC)    Atrial fibrillation (HCC)    BPH with obstruction/lower urinary tract symptoms    Congenital cystic kidney disease    Venous insufficiency    Intermittent claudication (HCC)    Transient ischemic attack    Sleep apnea    Rosacea    Fuchs' corneal dystrophy    Excessive daytime sleepiness    Hx of blood clots    Former smoker    Lumbar stenosis with neurogenic claudication    Congestive heart failure (HCC)    Lumbar spondylosis    Balance disorder    Postlaminectomy syndrome, lumbar region    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[facility] [address]    Mixed hyperlipidemia    Sick sinus syndrome ([facility] [address]    Presence of cardiac pacemaker    Lumbar post-laminectomy syndrome    Pleural effusion    Melena    Gastritis without bleeding    Impaired mobility and activities of daily living    Pneumonia of both lungs due to infectious organism         Thank you for the opportunity to participate in the care of your patient. Do not hesitate to call if you have any questions.     Electronically signed by Jie Thurman MD, [facility] on 2/25/2023 at 2:55 PM

## 2023-02-25 NOTE — PROGRESS NOTES
Shift assessment completed and medications given per MAR. VSS and alert and oriented. Patient is planning on moving to rehab floor today as soon as cleared by attending. Patient's IV was removed due to leaking. 1400  At hourly rounding patient stated to said nurse that he was having discomfort in his left arm and asked to pain medication.  Said nurse gave PRN medication for 7 out of 10 pain

## 2023-02-25 NOTE — DISCHARGE SUMMARY
Physician Discharge Summary     Patient ID:  Jermain Stewart  [id number]  36 y.o.  1935    Admit date: 2/18/2023    Discharge date : 02/25/23     Admitting Physician: Italia Patterson DO     Discharge Physician: Sujata Villanueva MD     Admission Diagnoses: Pleural effusion [J90]    Discharge Diagnoses: Loculated pleural effusion, mesothelioma, GI bleed, atrial fibrillation    Admission Condition: fair    Discharged Condition: good    Hospital Course:   Loculated pleural effusion: Chest tube in place; possibly related to mesothelioma; pain regimen is doing well now  2/22 -initially patient prepared for discharge today however fluid culture noted positive today. Patient was febrile x1. Discussed with pulmonology that patient will likely require IV antibiotics for extended period. Infectious disease consultation. CT chest ordered. Will likely need PICC line and possible placement. Initial DC plan held  2/23 - await id recs appreciate cts and pulm input. 2/24 -we will reculture from Pleurx cath tomorrow. Patient is currently off of vancomycin and continuing meropenem. Discussed at length with infectious disease. We will continue for 10 days of meropenem. Plan to be altered depending on culture results. Currently pending rehab evaluation and approval.  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 Monitor on clears overnight, probable dc home tomorrow. atrial fibrillation: Hold warfarin for now. Continue metoprolol for rate control.   Chronic pain with spinal stimulator: Consult to pain management   Sick sinus syndrome status post pacemaker: Continue beta-blocker  SCOTTY escalate to BiPAP before surgery.  5 L bleed in O2  Polycythemia: Follow CBC.  Stat CBC now    Consults: cardiology, pulmonary/intensive care, ID, and cardiothoracic surgery    Significant Diagnostic Studies: as below    Discharge Exam:  BP (!) 103/51   Pulse 64   Temp 99 °F (37.2 °C) (Oral)   Resp 18   Ht 5' 7\" (1.702 m)   Wt 243 lb (110.2 kg)   SpO2 95%   BMI 38.06 kg/m²   General appearance: alert, appears stated age, and cooperative  Lungs: clear to auscultation bilaterally  Heart: regular rate and rhythm, S1, S2 normal, no murmur, click, rub or gallop  Abdomen: soft, non-tender; bowel sounds normal; no masses,  no organomegaly  Extremities: extremities normal, atraumatic, no cyanosis or edema  Skin: Skin color, texture, turgor normal. No rashes or lesions    Labs:   Recent Labs     02/23/23 0459 02/24/23  0432 02/24/23  1141 02/24/23  2333 02/25/23  0516 02/25/23  1154   WBC 7.3 7.0  --   --  8.2  --    HGB 7.3* 7.0*   < > 7.2* 7.0* 7.2*   HCT 22.7* 20.3*   < > 20.9* 21.4* 21.7*    200  --   --  233  --     < > = values in this interval not displayed.     Recent Labs     02/23/23 0459 02/24/23  0431 02/25/23  0516    140 142   K 3.4 3.5 3.3*    108* 108*   CO2 20 23 22   BUN 27* 24* 22   CREATININE 1.37* 1.35* 1.31*   CALCIUM 8.2* 7.9* 8.0*     No results for input(s): AST, ALT, BILIDIR, BILITOT, ALKPHOS in the last 72 hours.  Recent Labs     02/23/23 0459 02/24/23  0431 02/25/23  0516   INR 1.4 1.6 1.9     No results for input(s): CKTOTAL, TROPONINI in the last 72 hours.    Urinalysis:   Lab Results   Component Value Date/Time    NITRU Negative 02/19/2023 03:13 PM    WBCUA 3-5 02/19/2023 03:13 PM    BACTERIA Negative 02/19/2023 03:13 PM    RBCUA 6-10 02/19/2023 03:13 PM    BLOODU TRACE 02/19/2023 03:13 PM    SPECGRAV 1.028 02/19/2023 03:13 PM    GLUCOSEU Negative 02/19/2023 03:13 PM       Radiology:   Most recent    Chest CT      WITH CONTRAST:No results found for  this or any previous visit. WITHOUT CONTRAST: Results for orders placed during the hospital encounter of 02/18/23    CT CHEST WO CONTRAST    Narrative  EXAMINATION:  CT OF THE CHEST WITHOUT CONTRAST 2/22/2023 9:34 pm    TECHNIQUE:  CT of the chest was performed without the administration of intravenous  contrast. Multiplanar reformatted images are provided for review. Automated  exposure control, iterative reconstruction, and/or weight based adjustment of  the mA/kV was utilized to reduce the radiation dose to as low as reasonably  achievable. COMPARISON:  None. HISTORY:  ORDERING SYSTEM PROVIDED HISTORY: pleural effusion  TECHNOLOGIST PROVIDED HISTORY:  Reason for exam:->pleural effusion  What reading provider will be dictating this exam?->CRC    FINDINGS:  Mediastinum: Thyroid is homogeneous in appearance. Vascular calcifications  seen within the coronary vessels. Cardiac chambers are mildly enlarged. Pacer leads in satisfactory position. Moderate-sized pericardial effusion. Bulky adenopathy identified in the left hilar region likely reactive. Small  lymph nodes seen scattered throughout the mediastinum likely reactive. Atherosclerotic disease seen diffusely throughout the thoracic aorta. Lungs/pleura: There is small chest tube identified in place on the left with  small left pleural effusion. Airspace consolidation seen within the left  upper and lower lung fields suggesting superimposed infiltrate such as  pneumonia. Mild increased markings identified at the right lung base to  suggest atelectatic change. Trace pneumothorax identified anteriorly. Upper Abdomen: Stones in the gallbladder. Small hiatal hernia. Soft Tissues/Bones: Multilevel degenerative changes seen within the spine. No acute chest wall abnormality. Impression  Indwelling chest tube identified on the left with small left pleural effusion  and trace anterior pneumothorax.   Consolidation seen in airspace disease  throughout the left upper and lower lobes to suggest a multifocal  superimposed pneumonia. Soft tissue density seen in the hilar region to  suggest possible reactive adenopathy. Moderate-sized pericardial effusion. Minimal atelectatic changes seen at the right lung base. Incidental stones  in the gallbladder with small hiatal hernia. CXR      2-view: No results found for this or any previous visit. Portable: Results for orders placed during the hospital encounter of 02/18/23    XR CHEST PORTABLE    Narrative  EXAMINATION:  ONE XRAY VIEW OF THE CHEST    2/19/2023 7:51 am    COMPARISON:  02/18/2023    HISTORY:  ORDERING SYSTEM PROVIDED HISTORY: pleural effusion  TECHNOLOGIST PROVIDED HISTORY:  Reason for exam:->pleural effusion  What reading provider will be dictating this exam?->CRC    FINDINGS:  Note is made of a left chest tube. There is no left pneumothorax. There is  been partial clearing of the previously noted left pleural effusion. Significant residual airspace disease as well as a residual pleural effusion  is noted    The right lung is clear. There is a dual lead cardiac pacer on the left. Impression  1. Minimal partial clearing of the left lung. The previously noted left  pleural effusion is smaller  2. Stable position of the left chest tube  3. The right lung is clear. Echo No results found for this or any previous visit.       Disposition: acute rehab    In process/preliminary results:  Outstanding Order Results       Date and Time Order Name Status Description    2/23/2023 10:52 AM EKG 12 Lead Preliminary             Patient Instructions:   Current Discharge Medication List        CONTINUE these medications which have NOT CHANGED    Details   carboxymethylcellulose (THERATEARS) 1 % ophthalmic gel Apply to eye      melatonin 5 MG TABS tablet Take by mouth      enoxaparin (LOVENOX) 100 MG/ML Inject into the skin 2 times daily      metoprolol tartrate (LOPRESSOR) 50 MG tablet Take 25 mg by mouth 2 times daily      Elastic Bandages & Supports (MEDICAL COMPRESSION STOCKINGS) MISC 1 each by Does not apply route daily Thigh high 20-30 mmhg graduated compression stockings both legs wear daily during day and off Qhs. Wear as tolerated. Do not wear if they cause increased pain. Qty: 1 each, Refills: 5    Associated Diagnoses: Hx of blood clots; Former smoker; Hypertension, unspecified type; Obesity due to excess calories, unspecified classification, unspecified whether serious comorbidity present; Peripheral vascular disease of extremity ([facility] [address] Pain and swelling of lower leg, unspecified laterality; Chronic venous hypertension (idiopathic) with inflammation of bilateral lower extremity; Chronic venous hypertension (idiopathic) with other complications of bilateral lower extremity      losartan (COZAAR) 100 MG tablet Take 100 mg by mouth in the morning. meloxicam (MOBIC) 15 MG tablet Take 15 mg by mouth in the morning. potassium chloride (KLOR-CON M) 20 MEQ extended release tablet Take 20 mEq by mouth in the morning and 20 mEq before bedtime. rosuvastatin (CRESTOR) 10 MG tablet TAKE 1 TABLET BY MOUTH EVERY DAY      furosemide (LASIX) 20 MG tablet Take 20 mg by mouth daily      !! warfarin (COUMADIN) 2.5 MG tablet Take 4 mg by mouth daily Indications: T-Th-Sat - Sun Daily per INR levels      nitroGLYCERIN (NITROSTAT) 0.4 MG SL tablet Place 0.4 mg under the tongue every 5 minutes as needed for Chest pain up to max of 3 total doses. If no relief after 1 dose, call 911. Omega-3 Fatty Acids (FISH OIL) 1200 MG CAPS Take by mouth daily      Cholecalciferol (VITAMIN D3) 125 MCG (5000 UT) TABS Take by mouth daily      CPAP Machine MISC by Does not apply route      !! warfarin (COUMADIN) 5 MG tablet Take 5 mg by mouth daily Indications: Omxoog-Vupjpqlwj-Oxuhmx Daily per INR levels      aspirin 81 MG EC tablet Take 81 mg by mouth daily.          !! - Potential duplicate medications found. Please discuss with provider. Activity: activity as tolerated  Diet: regular diet  Wound Care: as directed    Follow-up with pcp 1-2 weeks.     DC time 35 minutes    Signed:  Electronically signed by Sachi Ospina MD on 2/25/2023 at 2:27 PM

## 2023-02-25 NOTE — PROGRESS NOTES
Physical Therapy Med Surg Daily Treatment Note  Facility/Department: [facility]  Room: [place] [address]       NAME: Margo Donis  : 1935 (82 y.o.)  MRN: [id number]  CODE STATUS: Full Code    Date of Service: 2023    Patient Diagnosis(es): Pleural effusion [J90]   No chief complaint on file.     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    Mixed hyperlipidemia 2022    Congestive heart failure ([address] 2022    Lumbar spondylosis 2022    Balance disorder 2022    Lumbar stenosis with neurogenic claudication 2021    Atherosclerosis of native artery of both lower extremities with intermittent claudication ([address] 2021    Atrial fibrillation ([address] 2021    Venous insufficiency 2021    Sleep apnea 2021    Excessive daytime sleepiness 2021    Spinal stenosis of lumbar region with neurogenic claudication 2021    Transient ischemic attack 2019    Polycythemia 2018    Intermittent claudication ([address] 12/10/2018    Rosacea 2018    Fuchs' corneal dystrophy 2016    Ventral hernia, recurrent 2013    Ventral hernia 2012    Hx of blood clots     Former smoker     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        Past Medical History:   Diagnosis Date    A-fib ([address]     approx 1 year ago-cardioverted    Arthritis     Baker's cyst of knee, left     Cancer ([facility] [address]     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    Cerebral artery occlusion with cerebral infarction ([facility] [address] 2000    TIA sx felt funny --     Congestive heart failure ([facility] [address] 03/08/2022    Coronary artery disease     HTN (hypertension)     meds > 20 yrs    Hx of blood clots 2012    DVT left leg     Hyperlipidemia     meds > 20 yrs    Pacemaker 07/19/2022    Polycythemia     Torn meniscus     left knee     Past Surgical History:   Procedure Laterality Date    BACK SURGERY      COLONOSCOPY  2009    COLONOSCOPY  2012    CORONARY ANGIOPLASTY WITH STENT PLACEMENT  10/2006    x 1 cardiac stent    ENDOSCOPY, COLON, DIAGNOSTIC      EYE SURGERY      [place] with IOL OU    HERNIA REPAIR  03/2013    redo ca mesh in FVC.9288 -- umbilical hernia    JOINT REPLACEMENT Bilateral 2009 & 2011    Bilateral TKR    KNEE SURGERY Left      arthroscopic surgery to repair meniscus of left knee    LAMINECTOMY N/A 02/08/2021    RIGHT BILATERAL L2-3 3-4 4-5 MICRODECOMPRESSION performed by Deloris Wills MD at [facility] 7/19/22    PAIN MANAGEMENT PROCEDURE N/A 2/16/2023    ATTEMPTED SPINAL CORD STIMULATOR PERMANENT PLACEMENT performed by Alicia Cook MD at [address] N/A 1/10/2023    SPINAL CORD STIMULATOR TRIAL performed by Alicia Cook MD at [place]  age 8    [address]  03/2012    UPPER GASTROINTESTINAL ENDOSCOPY N/A 2/21/2023    EGD DIAGNOSTIC ONLY performed by Gold Tidwell MD at [facility]       Chart Reviewed: Yes  Family / Caregiver Present: Yes    Restrictions:  Restrictions/Precautions: Fall Risk    SUBJECTIVE:   Subjective: \"I need to use the restroom\"    Pain  Pain: no pain reported     OBJECTIVE:        Bed mobility  Supine to Sit: Moderate assistance  Sit to Supine:  Moderate assistance  Bed Mobility Comments: HOB flat  - intermittent use of rails required assistance needed to lift trunk upright and to lift BLE into bed. pt SOB with exertion    Transfers  Sit to Stand: Minimal Assistance; Moderate Assistance  Stand to Sit: Minimal Assistance; Moderate Assistance  Comment: multiple trials with varying assistance, pt with noted decreased ROM and weakness in L shoulder. Ambulation  Surface: Level tile  Device: Rolling Walker  Other Apparatus: O2  Assistance: Minimal assistance  Quality of Gait: forward flexed trunk, heavy UE use and support, short step length, knee stability fair, SOB following  - 2 L O2 in place with at  95%  Distance: 25 feet x 2  Comments: very SOB post gait, SpO2 97%    Stairs/Curb  Stairs?: No              PT Exercises  Exercise Treatment: seated EOB LAQ/HF x10 supine AP/QS/GS/HS x10          Activity Tolerance  Activity Tolerance: Patient limited by fatigue;Patient limited by endurance          ASSESSMENT   Assessment: pt limited by endurance, increased time and effort to walk to restroom and back. Discharge Recommendations:  Continue to assess pending progress         Goals  Long Term Goals  Long Term Goal 1: indep bed mobility  Long Term Goal 2: indep sit to stand  Long Term Goal 3: indep gait with ww 50 feet  Long Term Goal 4: pt able to stand with light BUE support 2 min    PLAN    General Plan: 1 time a day 3-6 times a week  Safety Devices  Type of Devices: Call light within reach, Left in bed, Bed alarm in place     AMPA (6 CLICK) BASIC MOBILITY  AM-PAC Inpatient Mobility Raw Score : 12      Therapy Time   Individual   Time In 1229   Time Out 1253   Minutes 24      Gait: 10  BM/trsf: 10  Therex: [address] [place], 02/25/23 at 2:27 PM         Definitions for assistance levels  Independent = pt does not require any physical supervision or assistance from another person for activity completion. Device may be needed.   Stand by assistance = pt requires verbal cues or instructions from another person, close to but not touching, to perform the activity  Minimal assistance= pt performs 75% or more of the activity; assistance is required to complete the activity  Moderate assistance= pt performs 50% of the activity; assistance is required to complete the activity  Maximal assistance = pt performs 25% of the activity; assistance is required to complete the activity  Dependent = pt requires total physical assistance to accomplish the task

## 2023-02-25 NOTE — PROGRESS NOTES
INPATIENT PROGRESS NOTES    PATIENT NAME: Laron Pollack  MRN: [id number]  SERVICE DATE:  February 25, 2023   SERVICE TIME:  2:29 PM      PRIMARY SERVICE: Pulmonary Disease    CHIEF COMPLAIN: Shortness of breath      INTERVAL HPI: Patient seen and examined at bedside, Interval Notes, orders reviewed. Nursing notes noted  Is doing better. He said he is going to rehab. He is comfortable in bed. He does get short of breath with any exertion. No chest pain. No fever or chills. No nausea vomiting diarrhea abdominal pain. .    OBJECTIVE    Body mass index is 38.06 kg/m². PHYSICAL EXAM:  Vitals:  BP (!) 103/51   Pulse 64   Temp 99 °F (37.2 °C) (Oral)   Resp 18   Ht 5' 7\" (1.702 m)   Wt 243 lb (110.2 kg)   SpO2 95%   BMI 38.06 kg/m²   General: Obese alert, awake . comfortable in bed, No distress. Head: Atraumatic , Normocephalic   Eyes: PERRL. No sclera icterus. No conjunctival injection. No discharge   ENT: No nasal  discharge. Pharynx clear. Neck:  Trachea midline. No thyromegaly, no JVD, No cervical adenopathy. Chest : Bilaterally symmetrical ,Normal effort,  No accessory muscle use  Lung : Diminished breath sound at left base no Rales. No wheezing. No rhonchi. Heart[de-identified] Normal  rate. Regular rhythm. No mumur ,  Rub or gallop  ABD: Non-tender. Non-distended. No masses. No organmegaly. Normal bowel sounds. No hernia. Ext : Trace pitting both leg , No Cyanosis No clubbing  Neuro: no focal weakness          DATA:   Recent Labs     02/24/23  0432 02/24/23  1141 02/25/23  0516 02/25/23  1154   WBC 7.0  --  8.2  --    HGB 7.0*   < > 7.0* 7.2*   HCT 20.3*   < > 21.4* 21.7*   MCV 88.7  --  87.5  --      --  233  --     < > = values in this interval not displayed.      Recent Labs     02/24/23  0431 02/25/23  0516    142   K 3.5 3.3*   * 108*   CO2 23 22   BUN 24* 22   CREATININE 1.35* 1.31*   GLUCOSE 110* 97   CALCIUM 7.9* 8.0*   LABGLOM 51.2* 53.1*       MV Settings:     FiO2 : 21 %    No results for input(s): PHART, LRQ8PSK, PO2ART, FRB0LHU, BEART, [id number] in the last 72 hours. O2 Device: Nasal cannula  O2 Flow Rate (L/min): 2 L/min    ADULT DIET; Regular; Low Sodium (2 gm)     MEDICATIONS during current hospitalization:    Continuous Infusions:      Scheduled Meds:   enoxaparin  1 mg/kg SubCUTAneous BID    warfarin  4 mg Oral Once per day on Sun Tue Thu Sat    warfarin  5 mg Oral Once per day on Mon Wed Fri    metoprolol tartrate  25 mg Oral BID    rosuvastatin  10 mg Oral Daily    [Held by provider] losartan  100 mg Oral Daily    furosemide  20 mg Oral Daily    aspirin  81 mg Oral Daily    pantoprazole (PROTONIX) 40 mg injection  40 mg IntraVENous Q12H    meropenem  1,000 mg IntraVENous Q8H    melatonin  5 mg Oral Nightly    guaiFENesin  600 mg Oral BID    naloxegol  25 mg Oral QAM       PRN Meds:acetaminophen, ondansetron **OR** ondansetron, ipratropium-albuterol, oxyCODONE **OR** oxyCODONE    Radiology  XR ABDOMEN (KUB) (SINGLE AP VIEW)    Result Date: 2/18/2023  EXAMINATION: FOUR SUPINE XRAY VIEW(S) OF THE ABDOMEN 2/18/2023 7:02 am COMPARISON: CHEST X-RAY 02/18/2023 HISTORY: ORDERING SYSTEM PROVIDED HISTORY: abd distentnion TECHNOLOGIST PROVIDED HISTORY: Reason for exam:->abd distentnion What reading provider will be dictating this exam?->CRC FINDINGS: Complete opacification of left hemithorax compatible with some combination of pleural effusion and atelectasis. Obscuration of the left heart border. Pericardial effusion not excluded. Dual chamber transvenous pacemaker in place. The lower pelvis was not imaged. Nonobstructive bowel gas pattern. The stomach, small bowel, and colon are nondilated. 9 mm calcification compatible with a stone at the midpole of the left kidney. Degenerative changes of the lumbar spine. 1.  Complete opacification of left hemithorax compatible with some combination of pleural effusion and atelectasis. Secondary obscuration of left heart border.  2. Nonobstructive bowel gas pattern. No acute abdominal disease identified. 3.  Probable left renal stone. XR CHEST PORTABLE    Result Date: 2/19/2023  EXAMINATION: ONE XRAY VIEW OF THE CHEST 2/19/2023 7:51 am COMPARISON: 02/18/2023 HISTORY: ORDERING SYSTEM PROVIDED HISTORY: pleural effusion TECHNOLOGIST PROVIDED HISTORY: Reason for exam:->pleural effusion What reading provider will be dictating this exam?->CRC FINDINGS: Note is made of a left chest tube. There is no left pneumothorax. There is been partial clearing of the previously noted left pleural effusion. Significant residual airspace disease as well as a residual pleural effusion is noted The right lung is clear. There is a dual lead cardiac pacer on the left. 1. Minimal partial clearing of the left lung. The previously noted left pleural effusion is smaller 2. Stable position of the left chest tube 3. The right lung is clear. XR CHEST PORTABLE    Result Date: 2/18/2023  EXAMINATION: ONE XRAY VIEW OF THE CHEST 2/18/2023 11:11 am COMPARISON: None. HISTORY: ORDERING SYSTEM PROVIDED HISTORY: chest tube placement TECHNOLOGIST PROVIDED HISTORY: Reason for exam:->chest tube placement What reading provider will be dictating this exam?->CRC FINDINGS: There is opacification of the left hemithorax. No evidence of pneumothorax. Air bronchograms are noted in the perihilar region. Since the prior study there appears to been placement of a left-sided chest tube with its tip near the left upper chest apex. Right-sided dual lead pacemaker is unchanged. Heart appears enlarged. Mild reticular opacities in the right lung are unchanged. Stable degenerative changes in the left shoulder and AC joint. 1.  Apparent interval placement of left chest tube catheter. No pneumothorax. 2.  Persistent opacification of the left hemithorax.      XR CHEST PORTABLE    Result Date: 2/18/2023  EXAMINATION: ONE XRAY VIEW OF THE CHEST 2/18/2023 7:02 am COMPARISON: There are no prior recent studies available for review HISTORY: ORDERING SYSTEM PROVIDED HISTORY: loculated pleural effusion TECHNOLOGIST PROVIDED HISTORY: Reason for exam:->loculated pleural effusion What reading provider will be dictating this exam?->CRC FINDINGS: The cardiac silhouette appears enlarged. Please note the left heart border is obscured due to the left lung pathology. There is near complete whiteout of the left hemithorax. The finding could represent a combination of partial collapse of the left lung and large pleural effusion. There is no right lung infiltrate or right pleural effusion. 1. Near complete whiteout of the left hemithorax likely represent a combination of partial collapse of the left lung and large pleural effusion 2. Cardiomegaly 3. The right lung is grossly clear. FLUORO FOR SURGICAL PROCEDURES    Result Date: 2/16/2023  EXAMINATION: SPOT FLUOROSCOPIC IMAGES 2/16/2023 6:54 am TECHNIQUE: Fluoroscopy was provided by the radiology department for procedure. Radiologist was not present during examination. FLUOROSCOPY DOSE AND TYPE: Radiation Exposure Index: Fluoroscopy time equals 56.5 seconds. Total dose equals 43.68 mGy, COMPARISON: None HISTORY: ORDERING SYSTEM PROVIDED HISTORY: pain TECHNOLOGIST PROVIDED HISTORY: Reason for exam:->pain What reading provider will be dictating this exam?->CRC Intraprocedural imaging. FINDINGS: 12 spot images of the lumbar spine were obtained. Intraprocedural fluoroscopic spot images as above. See separate procedure report for more information. IMPRESSION AND SUGGESTION:    Large left-sided pleural effusion, status for Pleurx catheter   worsening shortness of breath secondary to #1  SCOTTY  Heart failure  A-fib, on Coumadin at baseline currently Coumadin on hold    He is going to rehab today. We will follow him in rehab. Continue Pleurx catheter  drain every other day oncology is following regarding mesothelioma. .  Continue O2 to keep saturation 90% or above and continue present treatment plan. NOTE: This report was transcribed using voice recognition software. Every effort was made to ensure accuracy; however, inadvertent computerized transcription errors may be present.       Electronically signed by Tavon Bailey MD, FCCP on 2/25/2023 at 2:29 PM

## 2023-02-26 PROBLEM — Z90.2 ACQUIRED ABSENCE OF LUNG: Status: ACTIVE | Noted: 2022-10-12

## 2023-02-26 PROBLEM — N17.9 ACUTE KIDNEY FAILURE (HCC): Status: ACTIVE | Noted: 2022-07-19

## 2023-02-26 PROBLEM — Z78.9 IMPAIRED MOBILITY AND ADLS: Status: ACTIVE | Noted: 2023-02-26

## 2023-02-26 PROBLEM — Z74.09 IMPAIRED MOBILITY AND ADLS: Status: RESOLVED | Noted: 2023-02-26 | Resolved: 2023-02-26

## 2023-02-26 PROBLEM — K92.2 GASTROINTESTINAL HEMORRHAGE: Status: ACTIVE | Noted: 2023-02-18

## 2023-02-26 PROBLEM — Z74.09 IMPAIRED MOBILITY AND ADLS: Status: ACTIVE | Noted: 2023-02-26

## 2023-02-26 PROBLEM — Z78.9 IMPAIRED MOBILITY AND ADLS: Status: RESOLVED | Noted: 2023-02-26 | Resolved: 2023-02-26

## 2023-02-26 PROBLEM — R06.02 SOB (SHORTNESS OF BREATH): Status: ACTIVE | Noted: 2021-10-13

## 2023-02-26 LAB
ANION GAP SERPL CALCULATED.3IONS-SCNC: 10 MEQ/L (ref 9–15)
BASOPHILS ABSOLUTE: 0 K/UL (ref 0–0.2)
BASOPHILS RELATIVE PERCENT: 0.5 %
BUN BLDV-MCNC: 20 MG/DL (ref 8–23)
CALCIUM SERPL-MCNC: 8.1 MG/DL (ref 8.5–9.9)
CHLORIDE BLD-SCNC: 108 MEQ/L (ref 95–107)
CO2: 22 MEQ/L (ref 20–31)
CREAT SERPL-MCNC: 1.25 MG/DL (ref 0.7–1.2)
EOSINOPHILS ABSOLUTE: 0.3 K/UL (ref 0–0.7)
EOSINOPHILS RELATIVE PERCENT: 3.7 %
GFR SERPL CREATININE-BSD FRML MDRD: 56.2 ML/MIN/{1.73_M2}
GLUCOSE BLD-MCNC: 109 MG/DL (ref 70–99)
HCT VFR BLD CALC: 21.8 % (ref 42–52)
HEMOGLOBIN: 7.2 G/DL (ref 14–18)
INR BLD: 2.5
LYMPHOCYTES ABSOLUTE: 0.6 K/UL (ref 1–4.8)
LYMPHOCYTES RELATIVE PERCENT: 6.9 %
MCH RBC QN AUTO: 29 PG (ref 27–31.3)
MCHC RBC AUTO-ENTMCNC: 33.1 % (ref 33–37)
MCV RBC AUTO: 87.4 FL (ref 79–92.2)
MONOCYTES ABSOLUTE: 1 K/UL (ref 0.2–0.8)
MONOCYTES RELATIVE PERCENT: 11.6 %
NEUTROPHILS ABSOLUTE: 6.6 K/UL (ref 1.4–6.5)
NEUTROPHILS RELATIVE PERCENT: 77.3 %
PDW BLD-RTO: 15.7 % (ref 11.5–14.5)
PLATELET # BLD: 243 K/UL (ref 130–400)
POTASSIUM SERPL-SCNC: 3.9 MEQ/L (ref 3.4–4.9)
PROCALCITONIN: 0.06 NG/ML (ref 0–0.15)
PROTHROMBIN TIME: 27 SEC (ref 12.3–14.9)
RBC # BLD: 2.49 M/UL (ref 4.7–6.1)
SODIUM BLD-SCNC: 140 MEQ/L (ref 135–144)
WBC # BLD: 8.5 K/UL (ref 4.8–10.8)

## 2023-02-26 PROCEDURE — 92610 EVALUATE SWALLOWING FUNCTION: CPT

## 2023-02-26 PROCEDURE — 2700000000 HC OXYGEN THERAPY PER DAY

## 2023-02-26 PROCEDURE — 36415 COLL VENOUS BLD VENIPUNCTURE: CPT

## 2023-02-26 PROCEDURE — 84145 PROCALCITONIN (PCT): CPT

## 2023-02-26 PROCEDURE — 97162 PT EVAL MOD COMPLEX 30 MIN: CPT

## 2023-02-26 PROCEDURE — 85610 PROTHROMBIN TIME: CPT

## 2023-02-26 PROCEDURE — 80048 BASIC METABOLIC PNL TOTAL CA: CPT

## 2023-02-26 PROCEDURE — 6370000000 HC RX 637 (ALT 250 FOR IP): Performed by: NURSE PRACTITIONER

## 2023-02-26 PROCEDURE — 2580000003 HC RX 258: Performed by: FAMILY MEDICINE

## 2023-02-26 PROCEDURE — 92523 SPEECH SOUND LANG COMPREHEN: CPT

## 2023-02-26 PROCEDURE — 6370000000 HC RX 637 (ALT 250 FOR IP): Performed by: PHYSICAL MEDICINE & REHABILITATION

## 2023-02-26 PROCEDURE — 85025 COMPLETE CBC W/AUTO DIFF WBC: CPT

## 2023-02-26 PROCEDURE — 6370000000 HC RX 637 (ALT 250 FOR IP): Performed by: FAMILY MEDICINE

## 2023-02-26 PROCEDURE — 99223 1ST HOSP IP/OBS HIGH 75: CPT | Performed by: PHYSICAL MEDICINE & REHABILITATION

## 2023-02-26 PROCEDURE — 94640 AIRWAY INHALATION TREATMENT: CPT

## 2023-02-26 PROCEDURE — 1180000000 HC REHAB R&B

## 2023-02-26 PROCEDURE — 6360000002 HC RX W HCPCS: Performed by: FAMILY MEDICINE

## 2023-02-26 PROCEDURE — 97166 OT EVAL MOD COMPLEX 45 MIN: CPT

## 2023-02-26 PROCEDURE — 6360000002 HC RX W HCPCS: Performed by: PHYSICAL MEDICINE & REHABILITATION

## 2023-02-26 PROCEDURE — 94761 N-INVAS EAR/PLS OXIMETRY MLT: CPT

## 2023-02-26 RX ORDER — ACETAMINOPHEN 325 MG/1
650 TABLET ORAL EVERY 4 HOURS PRN
Status: DISCONTINUED | OUTPATIENT
Start: 2023-02-26 | End: 2023-02-26

## 2023-02-26 RX ORDER — VITAMIN B COMPLEX
2000 TABLET ORAL
Status: DISCONTINUED | OUTPATIENT
Start: 2023-02-26 | End: 2023-03-13 | Stop reason: HOSPADM

## 2023-02-26 RX ORDER — BISACODYL 10 MG
10 SUPPOSITORY, RECTAL RECTAL DAILY PRN
Status: DISCONTINUED | OUTPATIENT
Start: 2023-02-26 | End: 2023-03-13 | Stop reason: HOSPADM

## 2023-02-26 RX ORDER — CYANOCOBALAMIN 1000 UG/ML
1000 INJECTION, SOLUTION INTRAMUSCULAR; SUBCUTANEOUS WEEKLY
Status: DISCONTINUED | OUTPATIENT
Start: 2023-02-26 | End: 2023-03-13 | Stop reason: HOSPADM

## 2023-02-26 RX ORDER — UBIDECARENONE 100 MG
100 CAPSULE ORAL DAILY
Status: DISCONTINUED | OUTPATIENT
Start: 2023-02-26 | End: 2023-03-13 | Stop reason: HOSPADM

## 2023-02-26 RX ORDER — LIDOCAINE 4 G/G
3 PATCH TOPICAL DAILY
Status: DISCONTINUED | OUTPATIENT
Start: 2023-02-26 | End: 2023-02-27

## 2023-02-26 RX ORDER — SODIUM PHOSPHATE, DIBASIC AND SODIUM PHOSPHATE, MONOBASIC 7; 19 G/133ML; G/133ML
1 ENEMA RECTAL DAILY PRN
Status: DISCONTINUED | OUTPATIENT
Start: 2023-02-26 | End: 2023-03-13 | Stop reason: HOSPADM

## 2023-02-26 RX ORDER — WARFARIN SODIUM 2 MG/1
2 TABLET ORAL
Status: COMPLETED | OUTPATIENT
Start: 2023-02-26 | End: 2023-02-26

## 2023-02-26 RX ADMIN — Medication 2000 UNITS: at 16:42

## 2023-02-26 RX ADMIN — METOPROLOL TARTRATE 25 MG: 25 TABLET, FILM COATED ORAL at 08:31

## 2023-02-26 RX ADMIN — GUAIFENESIN 600 MG: 600 TABLET ORAL at 21:43

## 2023-02-26 RX ADMIN — MEROPENEM 1000 MG: 1 INJECTION, POWDER, FOR SOLUTION INTRAVENOUS at 06:53

## 2023-02-26 RX ADMIN — ACETAMINOPHEN 650 MG: 325 TABLET ORAL at 21:43

## 2023-02-26 RX ADMIN — IPRATROPIUM BROMIDE AND ALBUTEROL SULFATE 1 AMPULE: .5; 2.5 SOLUTION RESPIRATORY (INHALATION) at 12:19

## 2023-02-26 RX ADMIN — METOPROLOL TARTRATE 25 MG: 25 TABLET, FILM COATED ORAL at 21:44

## 2023-02-26 RX ADMIN — Medication 5 MG: at 21:43

## 2023-02-26 RX ADMIN — WARFARIN SODIUM 2 MG: 2 TABLET ORAL at 16:43

## 2023-02-26 RX ADMIN — GUAIFENESIN 600 MG: 600 TABLET ORAL at 08:31

## 2023-02-26 RX ADMIN — MEROPENEM 1000 MG: 1 INJECTION, POWDER, FOR SOLUTION INTRAVENOUS at 16:03

## 2023-02-26 RX ADMIN — FUROSEMIDE 20 MG: 20 TABLET ORAL at 08:31

## 2023-02-26 RX ADMIN — OXYCODONE 5 MG: 5 TABLET ORAL at 16:06

## 2023-02-26 RX ADMIN — CYANOCOBALAMIN 1000 MCG: 1000 INJECTION, SOLUTION INTRAMUSCULAR; SUBCUTANEOUS at 10:39

## 2023-02-26 RX ADMIN — ASPIRIN 81 MG: 81 TABLET, COATED ORAL at 08:31

## 2023-02-26 RX ADMIN — ROSUVASTATIN CALCIUM 10 MG: 5 TABLET, FILM COATED ORAL at 08:31

## 2023-02-26 RX ADMIN — Medication 100 MG: at 10:39

## 2023-02-26 RX ADMIN — NALOXEGOL OXALATE 25 MG: 12.5 TABLET, FILM COATED ORAL at 08:31

## 2023-02-26 RX ADMIN — OXYCODONE 10 MG: 5 TABLET ORAL at 05:48

## 2023-02-26 ASSESSMENT — ENCOUNTER SYMPTOMS
NAUSEA: 0
DIARRHEA: 0
BLOOD IN STOOL: 0
PHOTOPHOBIA: 0
SWOLLEN GLANDS: 0
RHINORRHEA: 0
BACK PAIN: 1
EYE PAIN: 0
EYE REDNESS: 0
ORTHOPNEA: 1
STRIDOR: 0
SPUTUM PRODUCTION: 1
VOMITING: 0
ABDOMINAL PAIN: 0
SHORTNESS OF BREATH: 1
COUGH: 1
CONSTIPATION: 1
SORE THROAT: 0
WHEEZING: 1

## 2023-02-26 ASSESSMENT — PAIN SCALES - GENERAL
PAINLEVEL_OUTOF10: 5
PAINLEVEL_OUTOF10: 5
PAINLEVEL_OUTOF10: 3
PAINLEVEL_OUTOF10: 9

## 2023-02-26 ASSESSMENT — PAIN DESCRIPTION - LOCATION
LOCATION: SHOULDER

## 2023-02-26 ASSESSMENT — PAIN - FUNCTIONAL ASSESSMENT
PAIN_FUNCTIONAL_ASSESSMENT: PREVENTS OR INTERFERES SOME ACTIVE ACTIVITIES AND ADLS
PAIN_FUNCTIONAL_ASSESSMENT: ACTIVITIES ARE NOT PREVENTED

## 2023-02-26 ASSESSMENT — PAIN DESCRIPTION - DESCRIPTORS
DESCRIPTORS: ACHING
DESCRIPTORS: ACHING
DESCRIPTORS: SHARP;STABBING

## 2023-02-26 ASSESSMENT — PAIN DESCRIPTION - ORIENTATION
ORIENTATION: LEFT

## 2023-02-26 NOTE — PROGRESS NOTES
[facility]   Facility/Department: [facility]  Speech Language Pathology  Initial Speech/Language/Cognitive Assessment    NAME:Garth Banuelos  : 1935 (82 y.o.)   [x]   confirmed    MRN: [id number]  ROOM: [place] [id number]  ADMISSION DATE: 2023  PATIENT DIAGNOSIS(ES): Impaired mobility and ADLs [Z74.09, Z78.9]  No chief complaint on file.     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    Acquired absence of lung 10/12/2022    Presence of cardiac pacemaker 09/15/2022    Sick sinus syndrome ([address] 2022    Acute kidney failure ([address] 2022    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    Mixed hyperlipidemia 2022    SOB (shortness of breath) 10/13/2021    Congestive heart failure ([address] 2022    Lumbar spondylosis 2022    Balance disorder 2022    Lumbar stenosis with neurogenic claudication 2021    Atherosclerosis of native artery of both lower extremities with intermittent claudication ([address] 2021    Atrial fibrillation ([address] 2021    Venous insufficiency 2021    Sleep apnea 2021    Excessive daytime sleepiness 2021    Spinal stenosis of lumbar region with neurogenic claudication 2021    Transient ischemic attack 2019    Polycythemia 2018    Intermittent claudication ([address] 12/10/2018    Rosacea 2018    Fuchs' corneal dystrophy 2016    Ventral hernia, recurrent 2013    Ventral hernia 2012    Hx of blood clots 2012    Former smoker 2012    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     Past Medical History:   Diagnosis Date    A-fib ([facility]     approx 1 year ago-cardioverted    Arthritis     Baker's cyst of knee, left     Cancer ([facility]     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    Cerebral artery occlusion with cerebral infarction ([facility] 2000    TIA sx felt funny --     Congestive heart failure ([facility] 03/08/2022    Coronary artery disease     HTN (hypertension)     meds > 20 yrs    Hx of blood clots 2012    DVT left leg     Hyperlipidemia     meds > 20 yrs    Pacemaker 07/19/2022    Polycythemia     Torn meniscus     left knee     Past Surgical History:   Procedure Laterality Date    BACK SURGERY      COLONOSCOPY  2009    COLONOSCOPY  2012    CORONARY ANGIOPLASTY WITH STENT PLACEMENT  10/2006    x 1 cardiac stent    ENDOSCOPY, COLON, DIAGNOSTIC      EYE SURGERY      [place] with IOL OU    HERNIA REPAIR  03/2013    redo ca mesh in QFW.5122 -- umbilical hernia    JOINT REPLACEMENT Bilateral 2009 & 2011    Bilateral TKR    KNEE SURGERY Left      arthroscopic surgery to repair meniscus of left knee    LAMINECTOMY N/A 02/08/2021    RIGHT BILATERAL L2-3 3-4 4-5 MICRODECOMPRESSION performed by Adela Heck MD at [facility] 7/19/22    PAIN MANAGEMENT PROCEDURE N/A 2/16/2023    ATTEMPTED SPINAL CORD STIMULATOR PERMANENT PLACEMENT performed by Jenelle Groves MD at [address] N/A 1/10/2023    SPINAL CORD STIMULATOR TRIAL performed by Jenelle Groves MD at [facility]  age 8    [address]  03/2012    UPPER GASTROINTESTINAL ENDOSCOPY N/A 2/21/2023    EGD DIAGNOSTIC ONLY performed by Yadira Galo MD at [facility]       Date of Onset: 2/25/2023  Rehab Diagnosis:      Date of Evaluation: 2/26/2023   Evaluating Therapist: Shantel Horner SLP        Diagnosis: Pt presents with min to mod cognitive-linguistic deficits characterized by decreased memory, problem solving, numeric reasoning, and abstract reasoning negatively impacting his ability to safely perform ADLs. SLP noted min perseverations and increased processing time throughout the evaluation. Requires SLP Intervention: Yes     D/C Recommendations: Ongoing speech therapy is recommended during this hospitalization    General  Subjective: Pt alert and cooperative for assessment. Behavior/Cognition: Alert; Cooperative;Pleasant mood   Respiratory Status: O2 via nasual cannula  O2 Device: Nasal cannula  Liters of Oxygen: 4 L  Previous level of function and limitations:   Social/Functional History  Lives With: Spouse  Type of Home: House  Occupation: Retired  Type of Occupation: construction work  Leisure & Hobbies: Used to work on [employer] Comments: spouse completes all cooking, cleaning, laundry, money management ; pt manages own meds    Vision and Hearing  Vision  Vision Exceptions: Wears glasses at all times  Hearing  Hearing: Exceptions to [facility]  Hearing Exceptions: Hard of hearing/hearing concerns     Oral/Motor  Oral Motor   Labial: No impairment  Dentition: Upper dentures; Lower dentures  Oral Hygiene: Moist;Clean  Lingual: No impairment  Velum: No Impairment  Mandible: No impairment    Motor Speech  Motor Speech  Apraxic Characteristics: None  Dysarthric Characteristics: None    Comprehension  Auditory Comprehension  Comprehension: Within Functional Limits    Expression  Expression  Primary Mode of Expression: Verbal  Verbal Expression  Verbal Expression: Exceptions to functional limits  Initiation: WFL  Repetition: WFL  Automatic Speech: WFL  Confrontation: WFL  Convergent: Moderate  Divergent:  Moderate  Interfering Components: Perseverations (Mild perseveration observed)  Written Expression  Dominant Hand: Right  Written Expression: Unable to assess    Cognition  Orientation  Overall Orientation Status: Within Functional Limits  Attention  Attention: Within Functional Limits  Memory  Memory: Exceptions to [facility]  Short-term Memory: Mild  Working Memory: Mild  Problem Solving  Problem Solving: Exceptions to [facility]  Executive Function Skills: Moderate  Managing Finances: Moderate (Pt reports wife take care of all finances)  Managing Medications: Moderate  Numeric Reasoning  Numeric Reasoning: Exceptions to [facility]   Calculations: Moderate  Money Management: Mild  Time: Moderate  Abstract Reasoning  Abstract Reasoning: Exceptions to [facility]  Convergent Thinking: Moderate  Divergent Thinking: Moderate  Safety/Judgment  Safety/Judgment: Within Functional Limits    Additional Assessments  Informal Acute Rehab Cognitive-Linguistic Evaluation    Results of Cognitive-linguistic Evaluation Total Score for each section Percentage Score Severity Rating for each section   Auditory Comprehension 10/10 100% WNL   Verbal Reasoning 9/15 60% Moderate   Memory 18/20 90% WNL/mild   Problem Solving/Sequencing 9/10 90% WNL/mild   Executive Function 6/10 60% Moderate     Overall Cognitive-Linguistic Severity Rating 52/65 (80%) Mild         Recommendations  Requires SLP Intervention: Yes  D/C Recommendations: Ongoing speech therapy is recommended during this hospitalization  Patient Education: Pt educated on results of SLE  Patient Education Response: Verbalizes understanding;Demonstrated understanding  Duration of Treatment: 2 weeks or LOS until goal is met  Frequency of Treatment: 2-4 x/wk    Prognosis  Speech Therapy Prognosis  Prognosis: Good  Prognosis Considerations: Family/Community Support    Education  Individuals consulted  Consulted and agree with results and recommendations: Patient    Treatment/Goals  Short Term Goals  Time Frame for Short Term Goals: 2 weeks or LOS until goals are met.   Goal 1: To increase safety awareness and judgment for safe completion of ADLs secondary to pt's cognitive deficits, pt will complete abstract reasoning tasks (i.e. Word deduction, convergent and divergent naming, similarities/differences) with 80% accuracy and min cues. Goal 2: To increase safety awareness and judgment for safe completion of ADLs secondary to pt's cognitive deficits,  pt will complete min-mod level problem solving tasks related to ADLs (e.g. medication) with 80% accuracy and min cues. Goal 3: To increase safety awareness and judgment for safe completion of ADLs secondary to pt's cognitive deficits, pt will provide reasonable solutions to problems of everyday living with 80% accuracy and min cues. Goal 4: To decrease pt's cognitive deficits through the use of compensatory strategies, the pt will be educated on 3 different memory strategies and verbalize how he/she might use them at home in 3 ways with  min cues. Long Term Goals  Time Frame for Long Term Goals: 2 weeks or LOS until goals are met. Goal 1: Pt will demonstrate functional cognitive-linguistic abilities in all opportunities with modified independence in order to safely complete ADLs. Safety Devices  Safety Devices  Safety Devices in place: Yes  Type of devices: Bed alarm in place;Call light within reach  Restraints Initially in Place: No    Pain Assessment  Patient does not c/o pain. Pain Re-assessment  Patient does not c/o pain.     Speech Therapy Level of Assistance Scale:  AUDITORY COMPREHENSION  Rating: Independent  VERBAL EXPRESSION  Rating: Minimal Assistance  MOTOR SPEECH  Rating: Independent  PROBLEM SOLVING  Rating: Minimal Assistance-Moderate Assistance  MEMORY  Rating: Supervised Assistance-Minimal Assistance    Therapy Time  SLP Individual Minutes  Time In: 1000  Time Out: 8493  Minutes: 30             Signature: Electronically signed by GEOFF Segura on 2/26/2023 at 11:11 AM

## 2023-02-26 NOTE — PLAN OF CARE
See OT evaluation for all goals and OT POC.  Electronically signed by Cristina Montes OT on 2/26/2023 at 12:31 PM

## 2023-02-26 NOTE — PROGRESS NOTES
Facility/Department: [facility] Initial Assessment: Physical Therapy  [facility]    NAME: Jazmyne Vargas  : 1935  MRN: [id number]    Date of Service: 2023    Rehab Diagnosis(es):    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    Acquired absence of lung 10/12/2022    Presence of cardiac pacemaker 09/15/2022    Sick sinus syndrome ([address] 2022    Acute kidney failure ([address] 2022    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    Mixed hyperlipidemia 2022    SOB (shortness of breath) 10/13/2021    Congestive heart failure ([address] 2022    Lumbar spondylosis 2022    Balance disorder 2022    Lumbar stenosis with neurogenic claudication 2021    Atherosclerosis of native artery of both lower extremities with intermittent claudication ([address] 2021    Atrial fibrillation ([address] 2021    Venous insufficiency 2021    Sleep apnea 2021    Excessive daytime sleepiness 2021    Spinal stenosis of lumbar region with neurogenic claudication 2021    Transient ischemic attack 2019    Polycythemia 2018    Intermittent claudication ([address] 12/10/2018    Rosacea 2018    Fuchs' corneal dystrophy 2016    Ventral hernia, recurrent 2013    Ventral hernia 2012    Hx of blood clots     Former smoker     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       Past Medical History:   Diagnosis Date    A-fib ([facility]     approx 1 year ago-cardioverted    Arthritis     Baker's cyst of knee, left     Cancer ([facility]     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    Cerebral artery occlusion with cerebral infarction ([facility] 2000    TIA sx felt funny --     Congestive heart failure ([facility] 03/08/2022    Coronary artery disease     HTN (hypertension)     meds > 20 yrs    Hx of blood clots 2012    DVT left leg     Hyperlipidemia     meds > 20 yrs    Pacemaker 07/19/2022    Polycythemia     Torn meniscus     left knee     Past Surgical History:   Procedure Laterality Date    BACK SURGERY      COLONOSCOPY  2009    COLONOSCOPY  2012    CORONARY ANGIOPLASTY WITH STENT PLACEMENT  10/2006    x 1 cardiac stent    ENDOSCOPY, COLON, DIAGNOSTIC      EYE SURGERY      [place] with IOL OU    HERNIA REPAIR  03/2013    redo ca mesh in [facility].[id number] -- umbilical hernia    JOINT REPLACEMENT Bilateral 2009 & 2011    Bilateral TKR    KNEE SURGERY Left      arthroscopic surgery to repair meniscus of left knee    LAMINECTOMY N/A 02/08/2021    RIGHT BILATERAL L2-3 3-4 4-5 MICRODECOMPRESSION performed by Phong Miller MD at [facility] 7/19/22    PAIN MANAGEMENT PROCEDURE N/A 2/16/2023    ATTEMPTED SPINAL CORD STIMULATOR PERMANENT PLACEMENT performed by Delicia Baeza MD at [address] N/A 1/10/2023    SPINAL CORD STIMULATOR TRIAL performed by Delicia Baeza MD at [facility]  age 8    [address]  03/2012    UPPER GASTROINTESTINAL ENDOSCOPY N/A 2/21/2023    EGD DIAGNOSTIC ONLY performed by Claude Mile, MD at [facility]       Patient assessed for rehabilitation services?: Yes  Family / Caregiver Present: Yes (wife)    Restrictions:  Restrictions/Precautions: Fall Risk  Position Activity Restriction  Other position/activity restrictions: pleurx on L side     SUBJECTIVE:    Pain  Pain: no pain reported    Prior Level of Function:  Social/Functional History  Lives With: Spouse  Type of Home: House  Home Layout: One level (with basement; rarely goes down to basement)  Home Access: Stairs to enter without rails  Entrance Stairs - Number of Steps: 2-3  Bathroom Shower/Tub: Walk-in shower  Bathroom Toilet: Handicap height  Home Equipment: Sherl Aníbal, rolling, Rollator, Cane  ADL Assistance: Independent  Homemaking Assistance: Needs assistance  Ambulation Assistance: Independent (RW in home and rollator in community)  Transfer Assistance: Independent  Active : No  Occupation: Retired  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 pt pleasant, cooperative    ROM:  RLE AROM: WFL  LLE AROM : WFL    Strength:  Strength RLE  Comment: grossly 5/5  Strength LLE  Comment: grossly 4/5 except L ankle 2-/5    Neuro:  Balance  Sitting - Static: Good  Sitting - Dynamic: Good  Standing - Static: Fair;-  Standing - Dynamic: Fair;-  Tone RLE  RLE Tone: Normotonic  Tone LLE  LLE Tone: Normotonic    Bed mobility  Rolling to Left: Minimal assistance  Rolling to Right: Minimal assistance  Supine to Sit: Moderate assistance  Sit to Supine: Moderate assistance  Bed Mobility Comments: HOB flat; cues to avoid breath holding    Transfers  Sit to Stand:  Moderate Assistance  Stand to Sit: Moderate Assistance  Comment: poor use of L LE during sit to stand; [facility] used    Ambulation  Surface: Level tile  Device: Rolling Walker  Other Apparatus: O2  Assistance: Contact guard assistance  Quality of Gait: forward flexed trunk, heavy UE use and support, SOB , maintains unsafe distance inside AD  Gait Deviations: Slow Lety; Increased MENA; Decreased step length;Decreased step height  Distance: 35ft    Stairs/Curb  Stairs?: No    Activity Tolerance  Activity Tolerance: Patient limited by fatigue;Patient limited by endurance  Vitals  SpO2: 98 % (after amb on O2)     Quality Indicators (IRF-CATARINO):  Rolling L and R: Supervision or Touching Assistance - 4  Sit>Supine: Partial/Moderate Assistance (helper does <50%) - 3  Supine>Sit: Partial/Moderate Assistance (helper does <50%) - 3  Sit>Stand: Partial/Moderate Assistance (helper does <50%) - 3  Chair/Bed>Chair Transfer: Supervision or Touching Assistance - 4  Car Transfers: Not attempted due to Medical Condition or Safety Concerns (I.e. unsafe or physician orders) - 80  Walk 10 ft: Supervision or Touching Assistance - 4  Walk 50 ft with two 90 degree turns: Not attempted due to Medical Condition or Safety Concerns (I.e. unsafe or physician orders) - 80  Walk 150 ft in [address]: Not attempted due to Medical Condition or Safety Concerns (I.e. unsafe or physician orders) - 80  Walking 10 ft on Unlevel Surface: Not attempted due to Environmental Limitations (i.e. weather, equipment unavailable) - 10  Picking up Objects from Standing Position: Not attempted due to Medical Condition or Safety Concerns (I.e. unsafe or physician orders) - 80  Stairs: No Not attempted due to medical condition or safety concerns (i.e. unsafe or physician order) - 88  WC Mobility: No Not Applicable (pt did not complete item prior to admission) - 9    ASSESSMENT:  Body Structures, Functions, Activity Limitations Requiring Skilled Therapeutic Intervention: Decreased functional mobility ; Decreased safe awareness;Decreased strength;Decreased endurance;Decreased balance;Decreased posture  Decision Making: Medium Complexity  History: high  Exam: med  Clinical Presentation: med    Therapy Prognosis: Good  Barriers to Learning: none    CLINICAL IMPRESSION: Pt is 82 y.o. male exhibiting decreased activity tolerance and increased SOB with minimal exertion, decreased balance, and decreased L LE strength with carryover to pt requiring hands on assistance for all mobility at this time. Continued PT is required to progress toward highest level of indep for safe d/c home with wife. PLAN OF CARE:  Frequency: 1-2 treatment sessions per day, 5-7 days per week     Current Treatment Recommendations: Strengthening, Balance training, Functional mobility training, Transfer training, Gait training, Cognitive reorientation, Neuromuscular re-education, Safety education & training, Patient/Caregiver education & training, Equipment evaluation, education, & procurement, Endurance training, Stair training, Home exercise program, Positioning, Therapeutic activities    Patient's Goal:  \"get out of chairs easier. drive a car.  stand for longer time. \"    GOALS:  Patient Goals : \"get out of chairs easier. drive a car.  stand for longer time. \"  Long Term Goals  Long Term Goal 1: indep bed mobility  Long Term Goal 2: Pt will demonstrate transfers supervision with safest AD  Long Term Goal 3: Pt will demonstrate amb >/= 150ft supervision with safest AD  Long Term Goal 4: Pt will demonstrate stair negotiation 3 steps without rails with safest AD SBA  Long Term Goal 5: Pt will demonstrate pantoja balance assessment >/= 45/56 for decreased risk for falls.     ELOS:   Therapy Duration:  (1.5-2 wks)    Therapy Time:    Individual   Time In 1115   Time Out 1145   Minutes 30         Eval X 30 min    Coral Davila, PT, 02/26/23 at 12:57 PM

## 2023-02-26 NOTE — H&P
HISTORY & PHYSICAL       DATE OF ADMISSION:  2/25/2023    DATE OF SERVICE:  2/26/23    Subjective:    Yang Sanchez, 82 y.o. male presents today with:     CHIEF COMPLAINT:  Patient has been struggling with pulmonary problems related to community-acquired pneumonia. He had previously been to [facility] [place] and was admitted now to [facility] since 2/18/2023. Patient had initially been treated at [facility] [place] where a nerve stimulator placement was attempted but delayed due to high INR. Once the procedure was finally initiated it was aborted because of possible CSF leak. Patient became shortness of breath postprocedure but this was found to be largely unrelated to the procedure but related to a large loculated pleural effusion. Patient was transferred to [facility] for thoracotomy and VATS procedure performed by Dr. Tammy Cowart. As noted patient had a chest tube inserted by Dr. Tammy Cowart on 2/18/2023. He has been followed by thoracic surgery pulmonology oncology GI and cardiology as well as the hospitalist.    From a thoracic surgery standpoint cultures were sent from his pleural effusion which were positive and he is now on IV antibiotics. The left a Pleurx tube in O2   drain 3 times a week. The plan is for outpatient removal of the drain and if any worsening symptoms occur after the Pleurx is removed possible VATS surgery. They are trying to avoid surgery for now    Pulmonology followed him advising titrating O2 to keep sats above 90 continue home CPAP and continue draining the Pleurx daily. 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 He they noted his symptoms were improving and he has a history of following with Dr. Jj Del Toro at [facility] [employer] [facility] [place] [facility] regarding his mesothelioma. Discussed options regarding possible chemotherapy palliative care intermittent drainage of the Pleurx catheter and possible hospice care.   Currently they are advising treating anemia with IV Venofer here. Cardiology was consulted and obtain an echocardiogram to assess the pericardial effusion. She has stabilized medically continues to have acute rehabilitation needs as well as very complex medical issues as detailed above will be followed closely by his medical consultants well repeat having. Shortness of Breath  This is a recurrent problem. The current episode started more than 1 year ago. The problem occurs constantly. The problem has been waxing and waning. Associated symptoms include claudication, orthopnea, sputum production and wheezing. Pertinent negatives include no abdominal pain, chest pain, ear pain, fever, headaches, leg swelling, neck pain, rash, rhinorrhea, sore throat, swollen glands, syncope or vomiting. The symptoms are aggravated by weather changes, URIs, exercise, lying flat and any activity. He has tried oral steroids, leukotriene antagonists, beta agonist inhalers, body position changes, ipratropium inhalers, OTC inhalers, prescription cough suppressants, rest and steroid inhalers for the symptoms. The treatment provided mild relief. His past medical history is significant for CAD, chronic lung disease, COPD, DVT, a heart failure, pneumonia and a recent surgery. There is no history of allergies, aspirin allergies, asthma or bronchiolitis. Fatigue  This is a recurrent problem. The current episode started more than 1 year ago. The problem occurs constantly. The problem has been waxing and waning. Associated symptoms include anorexia, arthralgias, coughing, fatigue, myalgias and weakness. Pertinent negatives include no abdominal pain, chest pain, chills, congestion, diaphoresis, fever, headaches, nausea, neck pain, rash, sore throat, swollen glands or vomiting. The symptoms are aggravated by walking, exertion, coughing, sneezing, swallowing and standing. He has tried rest and acetaminophen for the symptoms. The treatment provided mild relief.      I reviewed recent nursing note, \" Pt walked to bathroom this am with walker and 02 at 2 liters. Sob with exertion. Pt voided and had med bm. Pt complains of 9/10 sharp pain to left shoulder. Pt requests pain med. Roxicodone 10 mg given this am for pain. 1+ edema noted to feet. \". The patient has stabilized medically and is able to participate at acute level rehab but is too medically complex for SNF due to need for therapy at the acute level with at least 15 hours a week of PT OT and cognitive and recreational therapy at an acute level with daily medical monitoring. Imaging:  Imaging and other studies reviewed and discussed with patient and staff    XR ABDOMEN   2/18/2023   1. Complete opacification of left hemithorax compatible with some combination of pleural effusion and atelectasis. Secondary obscuration of left heart border. 2.  Nonobstructive bowel gas pattern. No acute abdominal disease identified. 3.  Probable left renal stone. CT CHEST   2/23/2023  Mediastinum: Thyroid is homogeneous in appearance. Vascular calcifications seen within the coronary vessels. Cardiac chambers are mildly enlarged. Pacer leads in satisfactory position. Moderate-sized pericardial effusion. Bulky adenopathy identified in the left hilar region likely reactive. Small lymph nodes seen scattered throughout the mediastinum likely reactive. Atherosclerotic disease seen diffusely throughout the thoracic aorta. Lungs/pleura: There is small chest tube identified in place on the left with small left pleural effusion. Airspace consolidation seen within the left upper and lower lung fields suggesting superimposed infiltrate such as pneumonia. Mild increased markings identified at the right lung base to suggest atelectatic change. Trace pneumothorax identified anteriorly. Upper Abdomen: Stones in the gallbladder. Small hiatal hernia. Soft Tissues/Bones: Multilevel degenerative changes seen within the spine.  No acute chest wall abnormality. Indwelling chest tube identified on the left with small left pleural effusion and trace anterior pneumothorax. Consolidation seen in airspace disease throughout the left upper and lower lobes to suggest a multifocal superimposed pneumonia. Soft tissue density seen in the hilar region to suggest possible reactive adenopathy. Moderate-sized pericardial effusion. Minimal atelectatic changes seen at the right lung base. Incidental stones in the gallbladder with small hiatal hernia. XR CHEST 2023   1. Minimal partial clearing of the left lung. The previously noted left pleural effusion is smaller   2. Stable position of the left chest tube   3. The right lung is clear. XR CHEST   2023   1. Apparent interval placement of left chest tube catheter. No pneumothorax. 2.  Persistent opacification of the left hemithorax. XR CHEST   2023   1. Near complete whiteout of the left hemithorax likely represent a combination of partial collapse of the left lung and large pleural effusion   2. Cardiomegaly   3. The right lung is grossly clear. US DUP UPPER EXTREMITY RIGHT VENOUS 2023   No evidence of DVT. FLUORO FOR SURGICAL PROCEDURES 2023 intraprocedural fluoroscopic spot images as above. See separate procedure report for more information. EGD 2023  [id number] [facility] Patient: Kash Aguilar MRN: [id number] : 1935 Account: Gender: Male Age: 82 Years Procedure: Upper GI endoscopy Date: 2023 Attending Physician: Winter Clayton Indications:        -  Anemia        -  Melena Medications:        -  Monitored Anesthesia Care Complications:        -  No immediate complications. Estimated Blood Loss:        -  Estimated blood loss: none. Procedure:        - The Gastroscope was introduced through the mouth and advanced to the second           part of the duodenum.        -  The patient tolerated the procedure well.  Findings:        -  A 2 cm hernia was found.        -  Esophagogastric landmarks were identified: the gastroesophageal junction           was found at 36 cm, the lower esophageal sphincter was found at 38 cm and the           upper extent of the gastric folds was found at 36 cm from the incisors. -  Patchy moderate inflammation characterized by erythema was found in the           gastric antrum and in the gastric body. -  The examined duodenum was normal.        -  The cardia and gastric fundus were normal on retroflexion.        - No old or fresh blood noted Impression:        -  2 cm hernia. -  Esophagogastric landmarks identified.        -  Gastritis. -  Normal examined duodenum.        -  No specimens collected. - No old or fresh blood noted Recommendation:        -  Put patient on a clear liquid diet starting today. -  Continue present medications. - Further recommendations per inpatient team Procedure Code(s):        - 03809, Esophagogastroduodenoscopy, flexible, transoral; diagnostic,           including collection of specimen(s) by brushing or washing, when performed           (separate procedure) Diagnosis Code(s):        - D64.9, Anemia, unspecified        - K92.1, Melena (includes Hematochezia)        - K44.9, Diaphragmatic hernia without obstruction or gangrene        - K29.70, Gastritis, unspecified, without bleeding       CPT(R) - 2022 copyright American Medical Association. All Rights Reserved. The CPT codes, CCI edits and ICD codes generated are intended as suggestions       and were generated based on input data. These codes are preliminary and upon        review may be revised to meet current compliance and payer requirements. The provider is responsible for the final determination of appropriate codes,       and modifiers. Scope Withdrawal Time:       00:07:45     -Mary Lou Oviedo MD This document has been electronically signed.             Labs:    Labs reviewed and discussed with patient and staff    Lab Results   Component Value Date/Time    POCGLU 113 11/01/2013 03:39 PM    POCGLU 137 11/01/2013 11:16 AM    POCGLU 105 11/01/2013 07:52 AM     Lab Results   Component Value Date/Time     02/26/2023 04:49 AM    K 3.9 02/26/2023 04:49 AM    K 3.3 02/25/2023 05:16 AM     02/26/2023 04:49 AM    CO2 22 02/26/2023 04:49 AM    BUN 20 02/26/2023 04:49 AM    CREATININE 1.25 02/26/2023 04:49 AM    CALCIUM 8.1 02/26/2023 04:49 AM    LABALBU 3.1 02/19/2023 05:27 AM    LABALBU 4.5 04/25/2012 01:30 PM    BILITOT 0.3 02/19/2023 05:27 AM    ALKPHOS 73 02/19/2023 05:27 AM    AST 27 02/19/2023 05:27 AM    ALT 12 02/19/2023 05:27 AM     Lab Results   Component Value Date/Time    WBC 8.5 02/26/2023 05:26 AM    RBC 2.49 02/26/2023 05:26 AM    HGB 7.2 02/26/2023 05:26 AM    HCT 21.8 02/26/2023 05:26 AM    MCV 87.4 02/26/2023 05:26 AM    MCH 29.0 02/26/2023 05:26 AM    MCHC 33.1 02/26/2023 05:26 AM    RDW 15.7 02/26/2023 05:26 AM     02/26/2023 05:26 AM    MPV 8.2 11/01/2013 05:36 AM     Lab Results   Component Value Date/Time    VITD25 31.4 02/20/2023 05:27 AM     Lab Results   Component Value Date/Time    COLORU Yellow 02/19/2023 03:13 PM    NITRU Negative 02/19/2023 03:13 PM    GLUCOSEU Negative 02/19/2023 03:13 PM    KETUA TRACE 02/19/2023 03:13 PM    UROBILINOGEN 0.2 02/19/2023 03:13 PM    BILIRUBINUR Negative 02/19/2023 03:13 PM     Lab Results   Component Value Date/Time    PROTIME 30.1 02/27/2023 05:01 AM     Lab Results   Component Value Date/Time    INR 2.9 02/27/2023 05:01 AM           The patient remains highly medically complex and continues to have severe problems with activities of daily living and mobility. The patient was assessed to be able to tolerate intensive rehabilitation and therefore was admitted to Rehabilitation to address these needs.     The patient has been found to have severe abnormality of gait and mobility with impaired self care and is admitted to the acute inpatient rehab program.       Prior Function; everyday activities:     Social History     Socioeconomic History    Marital status:      Spouse name: Coty Gallardo    Number of children: Not on file    Years of education: Not on file    Highest education level: Not on file   Occupational History    Not on file   Tobacco Use    Smoking status: Former     Packs/day: 0.50     Years: 10.00     Pack years: 5.00     Types: Cigarettes     Quit date: 1968     Years since quittin.9    Smokeless tobacco: Never   Vaping Use    Vaping Use: Never used   Substance and Sexual Activity    Alcohol use: Yes     Comment: social    Drug use: Never    Sexual activity: Not on file   Other Topics Concern    Not on file   Social History Narrative    Lives With: Spouse Ashleigh    Type of Home: House in [address] [place] [address] Street: One level (basement)    Home Access: Stairs to enter without rails (hoolds on to door jamb)    Entrance Stairs - Number of Steps: 2    Bathroom Shower/Tub: Walk-in shower    Home Equipment: Walker, rolling, Rollator, Cane (uses rollator outside)    Has the patient had two or more falls in the past year or any fall with injury in the past year?: Yes (broken rib and puncture lung)    ADL Assistance: Independent    Homemaking Assistance: Needs assistance (staniding tolerance is limited)    Ambulation Assistance: Independent (ww)    Transfer Assistance: Independent    Active : No    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[employer]    Additional Comments: right handed         Social Determinants of Health     Financial Resource Strain: Not on file   Food Insecurity: Not on file   Transportation Needs: Not on file   Physical Activity: Not on file   Stress: Not on file   Social Connections: Not on file   Intimate Partner Violence: Not on file   Housing Stability: Not on file     Social supports listed above. Prior Device(s) used:   As above    History of falls:  Rarely falls    In depth analysis of complex functional data; the patient has been:    Current Rehabilitation Assessments:  Physical Therapy:   Bed mobility:  Bed mobility  Rolling to Left: Minimal assistance (02/26/23 1242)  Rolling to Right: Minimal assistance (02/26/23 1242)  Supine to Sit: Moderate assistance (02/26/23 1242)  Sit to Supine: Moderate assistance (02/26/23 1242)  Bed Mobility Comments: HOB flat; cues to avoid breath holding (02/26/23 1242)  Transfers:  Transfers  Sit to Stand: Moderate Assistance (02/26/23 1243)  Stand to Sit: Moderate Assistance (02/26/23 1243)  Comment: poor use of L LE during sit to stand; WW used (02/26/23 1243)  Gait:   Ambulation  Surface: Level tile (02/26/23 1244)  Device: Rolling Walker (02/26/23 1244)  Other Apparatus: O2 (02/26/23 1244)  Assistance: Contact guard assistance (02/26/23 1244)  Quality of Gait: forward flexed trunk, heavy UE use and support, SOB , maintains unsafe distance inside AD (02/26/23 1244)  Gait Deviations: Slow Lety;Increased MENA;Decreased step length;Decreased step height (02/26/23 1244)  Distance: 35ft (02/26/23 1244)  Stairs:  Stairs/Curb  Stairs?: No (02/26/23 1244)  W/C mobility:       Occupational Therapy:   Hand Dominance: Right  ADL  Feeding: Stand by assistance (02/26/23 1201)  Grooming: Minimal assistance (02/26/23 1201)  UE Bathing: Minimal assistance (02/26/23 1201)  LE Bathing: Maximum assistance (02/26/23 1201)  LE Bathing Skilled Clinical Factors: periare only per pt request (02/26/23 1201)  UE Dressing: Moderate assistance (02/26/23 1201)  LE Dressing: Maximum assistance (02/26/23 1201)  Toileting: Maximum assistance (02/26/23 1201)  Additional Comments: sponge bath ADL completed sitting EOB and patially on toilet. (02/26/23 1201)  Toilet Transfers  Toilet - Technique: Ambulating (02/26/23 1207)  Equipment Used: Grab bars (02/26/23 1207)  Toilet Transfer: Moderate assistance (02/26/23 1207)  Toilet  Transfers Comments: CGA on ModA off (02/26/23 1207)          Speech Therapy:      Comprehension: Within Functional Limits  Verbal Expression: Exceptions to functional limits  Diet/Swallow:           Compensatory Swallowing Strategies : Alternate solids and liquids, Eat/Feed slowly, Upright as possible for all oral intake, Small bites/sips             COGNITION:  OT:    SP:          Prior to admission patient was independent with all ADLs and mobilityand did not require any outside services.        Past Medical History:   Diagnosis Date    A-fib ([facility] [address]     approx 1 year ago-cardioverted    Arthritis     Baker's cyst of knee, left     Cancer ([facility] [address]     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    Cerebral artery occlusion with cerebral infarction ([facility] [address] 2000    TIA sx felt funny --     Congestive heart failure ([facility] [address] 03/08/2022    Coronary artery disease     HTN (hypertension)     meds > 20 yrs    Hx of blood clots 2012    DVT left leg     Hyperlipidemia     meds > 20 yrs    Pacemaker 07/19/2022    Polycythemia     Torn meniscus     left knee       Past Surgical History:   Procedure Laterality Date    BACK SURGERY      COLONOSCOPY  2009    COLONOSCOPY  2012    CORONARY ANGIOPLASTY WITH STENT PLACEMENT  10/2006    x 1 cardiac stent    ENDOSCOPY, COLON, DIAGNOSTIC      EYE SURGERY      [place] with IOL OU    HERNIA REPAIR  03/2013    redo ca mesh in IUP.1511 -- umbilical hernia    JOINT REPLACEMENT Bilateral 2009 & 2011    Bilateral TKR    KNEE SURGERY Left      arthroscopic surgery to repair meniscus of left knee    LAMINECTOMY N/A 02/08/2021    RIGHT BILATERAL L2-3 3-4 4-5 MICRODECOMPRESSION performed by Riky Gibson MD at [facility] 7/19/22    PAIN MANAGEMENT PROCEDURE N/A 2/16/2023    ATTEMPTED SPINAL CORD STIMULATOR PERMANENT PLACEMENT performed by Shanice Westfall MD at [address] N/A 1/10/2023    SPINAL CORD STIMULATOR TRIAL performed by Dalton Mcgowan Andrez Quinones MD at [place]  age 8    UMBILICAL HERNIA REPAIR  03/2012    UPPER GASTROINTESTINAL ENDOSCOPY N/A 2/21/2023    EGD DIAGNOSTIC ONLY performed by Mireya Goff MD at [facility]       Current Facility-Administered Medications   Medication Dose Route Frequency Provider Last Rate Last Admin    Vitamin D (CHOLECALCIFEROL) tablet 2,000 Units  2,000 Units Oral Dinner Coral Scullin, DO   2,000 Units at 02/26/23 1642    cyanocobalamin injection 1,000 mcg  1,000 mcg IntraMUSCular Weekly Coral Scullin, DO   1,000 mcg at 02/26/23 1039    coenzyme Q10 capsule 100 mg  100 mg Oral Daily Coral Scullin, DO   100 mg at 02/27/23 0818    lidocaine 4 % external patch 3 patch  3 patch TransDERmal Daily Coral Scullin, DO        bisacodyl (DULCOLAX) suppository 10 mg  10 mg Rectal Daily PRN Coral Scullin, DO        sodium phosphate (FLEET) rectal enema 1 enema  1 enema Rectal Daily PRN Coral Scullin, DO        warfarin placeholder: dosing by pharmacy   Other Kwasi 13, APRN - CNP        acetaminophen (TYLENOL) tablet 650 mg  650 mg Oral Q4H PRN Spike Alas MD   650 mg at 02/26/23 2143    aspirin EC tablet 81 mg  81 mg Oral Daily Spike Alas MD   81 mg at 02/27/23 0818    furosemide (LASIX) tablet 20 mg  20 mg Oral Daily Spike Alas MD   20 mg at 02/27/23 0818    guaiFENesin [facility]) extended release tablet 600 mg  600 mg Oral BID Spike Alas MD   600 mg at 02/27/23 0817    ipratropium-albuterol (DUONEB) nebulizer solution 1 ampule  1 ampule Inhalation Q4H PRN Spike Alas MD   1 ampule at 02/26/23 1219    melatonin disintegrating tablet 5 mg  5 mg Oral Nightly Spike Alas MD   5 mg at 02/26/23 2143    meropenem (MERREM) 1,000 mg in sodium chloride 0.9 % 100 mL IVPB (mini-bag)  1,000 mg IntraVENous Q8H Spike Alas MD   Stopped at 02/27/23 0330    metoprolol tartrate (LOPRESSOR) tablet 25 mg  25 mg Oral BID Spike Alas MD   25 mg at 02/27/23 0818    naloxegol (MOVANTIK) tablet 25 mg  25 mg Oral QAM Holden Horan MD   25 mg at 02/27/23 0817    ondansetron (ZOFRAN-ODT) disintegrating tablet 4 mg  4 mg Oral Q8H PRN Holden Horan MD        Or    ondansetron [facility]) injection 4 mg  4 mg IntraVENous Q6H PRN Holden Horan MD        oxyCODONE (ROXICODONE) immediate release tablet 5 mg  5 mg Oral Q4H PRN Holden Horan MD   5 mg at 02/27/23 1619    Or    oxyCODONE (ROXICODONE) immediate release tablet 10 mg  10 mg Oral Q4H PRN Holden Horan MD   10 mg at 02/26/23 0548    rosuvastatin (CRESTOR) tablet 10 mg  10 mg Oral Daily Holden Horan MD   10 mg at 02/27/23 5284     Facility-Administered Medications Ordered in Other Encounters   Medication Dose Route Frequency Provider Last Rate Last Admin    sodium chloride flush 0.9 % injection 10 mL  10 mL IntraVENous PRN Ajit Hammond MD   10 mL at 07/10/19 1220       Allergies   Allergen Reactions    Benadryl [Diphenhydramine Hcl] Anxiety    Diphenhydramine Anxiety     anxious                      FAMILY HISTORY:  Does not pertain to chief complaint. Review of Systems   Constitutional:  Positive for activity change and fatigue. Negative for chills, diaphoresis and fever. HENT:  Negative for congestion, ear discharge, ear pain, hearing loss, nosebleeds, rhinorrhea, sore throat and tinnitus. Eyes:  Negative for photophobia, pain and redness. Respiratory:  Positive for cough, sputum production, shortness of breath and wheezing. Negative for stridor. Shortness of breath on exertion   Cardiovascular:  Positive for orthopnea and claudication. Negative for chest pain, palpitations, leg swelling and syncope. Gastrointestinal:  Positive for anorexia and constipation. Negative for abdominal pain, blood in stool, diarrhea, nausea and vomiting. Endocrine: Negative for polydipsia. Genitourinary:  Negative for dysuria, flank pain, frequency, hematuria and urgency. Musculoskeletal:  Positive for arthralgias, back pain, gait problem and myalgias. Negative for neck pain. Skin:  Negative for rash. Allergic/Immunologic: Positive for immunocompromised state. Negative for environmental allergies. Neurological:  Positive for weakness and light-headedness. Negative for dizziness, tremors, seizures and headaches. Hematological:  Bruises/bleeds easily. Psychiatric/Behavioral:  Positive for dysphoric mood. Negative for hallucinations and suicidal ideas. The patient is not nervous/anxious. Objective  BP (!) 105/53   Pulse 70   Temp 98.8 °F (37.1 °C) (Oral)   Resp 13   Ht 5' 7\" (1.702 m)   Wt 261 lb 14.4 oz (118.8 kg)   SpO2 97%   BMI 41.02 kg/m² *    Physical Exam  Constitutional:       General: He is not in acute distress. Appearance: Normal appearance. He is obese. He is not ill-appearing, toxic-appearing or diaphoretic. HENT:      Head: Normocephalic and atraumatic. Right Ear: Tympanic membrane, ear canal and external ear normal. There is no impacted cerumen. Left Ear: Tympanic membrane, ear canal and external ear normal. There is no impacted cerumen. Nose: Nose normal. No congestion or rhinorrhea. Mouth/Throat:      Mouth: Mucous membranes are moist.      Pharynx: Oropharynx is clear. No oropharyngeal exudate or posterior oropharyngeal erythema. Comments: Upper and lower dentures  Eyes:      General: No scleral icterus. Right eye: No discharge. Left eye: No discharge. Extraocular Movements: Extraocular movements intact. Conjunctiva/sclera: Conjunctivae normal.      Pupils: Pupils are equal, round, and reactive to light. Cardiovascular:      Rate and Rhythm: Normal rate and regular rhythm. Pulses: Normal pulses. Heart sounds: Normal heart sounds. No murmur heard.      Comments: Pacemaker present-right chest wall ([employer]/placed 7/2022)  Pulmonary:      Effort: Pulmonary effort is normal. No respiratory distress. Breath sounds: Normal breath sounds. No wheezing. Chest:      Chest wall: Deformity and tenderness present. Breasts:     Right: Tenderness present. Abdominal:      General: Bowel sounds are normal. There is no distension. Palpations: Abdomen is soft. Tenderness: There is no abdominal tenderness. There is no guarding. Comments: Rounded abdomen   Genitourinary:     Comments: Deferred  Musculoskeletal:         General: No swelling. Normal range of motion. Cervical back: Normal range of motion. No rigidity. Right lower leg: Edema (non pitting) present. Left lower leg: Edema (non pitting) present. Comments: Pain with ROM bilateral lower extremities    Lymphadenopathy:      Cervical: No cervical adenopathy. Skin:     General: Skin is warm and dry. Capillary Refill: Capillary refill takes less than 2 seconds. Coloration: Skin is not jaundiced. Findings: No bruising, erythema or rash. Comments:  Lower back surgical scar-healed well    Neurological:      General: No focal deficit present. Mental Status: He is alert and oriented to person, place, and time. Motor: Weakness (bilateral lower extremities) present. Coordination: Finger-Nose-Finger Test abnormal.      Gait: Gait abnormal (in wheelchair) and tandem walk abnormal.      Deep Tendon Reflexes:      Reflex Scores:       Tricep reflexes are 1+ on the right side and 1+ on the left side. Bicep reflexes are 1+ on the right side and 1+ on the left side. Brachioradialis reflexes are 1+ on the right side and 1+ on the left side. Patellar reflexes are 1+ on the right side and 1+ on the left side. Achilles reflexes are 0 on the right side and 0 on the left side. Psychiatric:         Mood and Affect: Mood normal.         Behavior: Behavior normal.         Thought Content:  Thought content normal.         Judgment: Judgment normal.     Back Exam Muscle Strength   Right Quadriceps:  4/5   Left Quadriceps:  4/5   Right Hamstrings:  4/5   Left Hamstrings:  4/5         Neurologic Exam     Mental Status   Oriented to person, place, and time. Follows 1 step commands. Attention: decreased. Concentration: decreased. Level of consciousness: alert  Knowledge: good. Cranial Nerves     CN III, IV, VI   Pupils are equal, round, and reactive to light.     Motor Exam   Muscle bulk: normal  Right arm tone: normal  Left arm tone: normal    Strength   Right neck flexion: 4/5  Left neck flexion: 4/5  Right neck extension: 4/5  Left neck extension: 4/5  Right deltoid: 4/5  Left deltoid: 4/5  Right biceps: 4/5  Left biceps: 4/5  Right triceps: 4/5  Left triceps: 4/5  Right wrist flexion: 4/5  Left wrist flexion: 4/5  Right wrist extension: 4/5  Left wrist extension: 4/5  Right interossei: 4/5  Left interossei: 4/5  Right abdominals: 4/5  Left abdominals: 4/5  Right iliopsoas: 4/5  Left iliopsoas: 4/5  Right quadriceps: 4/5  Left quadriceps: 4/5  Right hamstrin/5  Left hamstrin/5  Right glutei: 4/5  Left glutei: 4/5  Right anterior tibial: 4/5  Left anterior tibial: 4/5  Right posterior tibial: 4/5  Left posterior tibial: 4/5  Right peroneal: 4/5  Left peroneal: 4/5  Right gastroc: 4/5  Left gastroc: 4/5    Sensory Exam   Right arm light touch: decreased from fingers  Left arm light touch: decreased from fingers  Right leg light touch: decreased from knee  Left leg light touch: decreased from knee    Gait, Coordination, and Reflexes     Coordination   Finger to nose coordination: abnormal  Tandem walking coordination: abnormal    Tremor   Resting tremor: absent  Intention tremor: absent  Action tremor: absent    Reflexes   Right brachioradialis: 1+  Left brachioradialis: 1+  Right biceps: 1+  Left biceps: 1+  Right triceps: 1+  Left triceps: 1+  Right patellar: 1+  Left patellar: 1+  Right achilles: 0  Left achilles: 0    After extensive review of the records and above physical exam, I have formulated the following diagnoses and plan:      DIAGNOSES:    1. The patient was admitted to the acute rehabilitation unit with the primary rehab diagnosis being severe abnormality of gait and mobility and impaired self care and ADL's due to cardiopulmonary debility. Compared to Pre-Admission Assessment, patients medical and functional status remain challengingly complex and patient continues to requireintensive therapeutic intervention from multiple therapies, therefore, initiate acute intensive comprehensive inpatient rehabilitation program including PT/OT to improve balance, ambulation, ADLs, and to improve the P/AROM. Functional and medical status reassessed regarding patients ability to participate in therapies and patient found to be able to participate in acute intensive comprehensive inpatient rehabilitation program.  Therapeutic modifications regarding activities in therapies, place, amount of time per day and intensity of therapy made daily. Enroll in acute course of therapy program to include 1 1/2 hours per day of PT 5 to 7days per week and 1 1/2 hours per day of OT 5 to 7 days per week,   SLP and Rec T 1/2 hour per day 3-5 days per week. The patient is stable medically and physically on previous exam.  If deemed necessary therapy will be spread out over a 7-day window. This patient presented with significant new onset decreased mobility and inability to perform activities of daily living skills independently and is at significant risk for prolonged disability  For this reason they have been admitted to [address] [place] [address]. The patient's current functional and medical status are highly complex but the patient is able to participate in intensive rehabilitation. A comprehensive inpatient rehabilitation program is appropriate.   The patient will undergo initial evaluation by the rehabilitation team and be discussed at regular treatment team meetings to assess progress, mobility, self care, mood and discharge issues. Physical therapy will be consulted for mobility and endurance issues and should be performed 1 to 2 times per day, 7 days per week for the length of stay. Occupational therapy will be consulted for activities of daily living and should be performed 1 to 2 times per day, 7 days per week for the length of stay. Their capacity to participate at an acute level, decision to be treated in the gym, room or on the unit, their activity goals for the day and the number of minutes of active participation will be reassessed and re-prescribed daily. Because this patient is medically complex, I will check a CBC, BMP, UA and orthostatic blood pressures. They will be reassessed daily regarding their ability to participate in an acute level rehabilitation program.  Recreational Therapy will be consulted for community re-entry and adjustment to disability. Communication, cognitive and emotional issues will also be addressed during this rehabilitation stay by rehabilitation psychologist or speech therapist as appropriate. I reviewed the patient's old and current charts and discussed other rehabilitation options with the rehabilitation team including the rehab RN and the admission team as well as the patient. I feel that the patient's functional recovery would be best served at an acute inpatient rehabilitation program because the patient needs intense therapy three hours per day, direct RN supervision and daily monitoring by a physician for medical status. This cannot be sufficiently provided by home health care, a skilled nursing facility or in an outpatient setting. I further feel that the patient has the potential to improve functional abilities in an acute intensive rehabilitation program.    Old records were reviewed and summarized.     2.  Other diagnoses which complicate rehabilitation stay include:     Principal Problem:    Impaired mobility and activities of daily living dt CP debility  Active Problems:  Mesothelioma-titrate O2, aerosols, follow-up with Dr. Glenn Hanson, drain pleural Dex daily versus 3 times a week per protocols from pulmonology and lung surgery consult  Mixed hyperlipidemia,  Atherosclerosis of native artery of both lower extremities with intermittent claudication, Atrial fibrillation, Venous insufficiency-Acute rehab to monitor heart rate and rhythm with the option of telemetry and the effects of chronotropic medication with respect to increasing physical activity and exercise in PT, OT, ADLs with medication titration to lowest effective dosing. Continue blood signs every shift focusing on heart rate, rhythm and blood pressure checks with orthostatic checks-monitoring the effect of exercise, therapy and posture. Consult hospitalist for backup medical and adjust/add medications (aspirin, Lasix, Lopressor, Crestor, Coumadin, bridging Lovenox). Monitor heart rate and blood pressure as well as medications effects on vital signs before during and after therapy with especial focus on preventing orthostasis and falls risk. Gastritis without bleeding-Elevate head of bed after meals, monitor stools for blood, lowest effective dose of PPI, consider Tums. Pneumonia of both lungs due to infectious organism-[facility] Acute rehab for endurance traing with Pulse Ox to monitoring oxygen saturation and heart rate with O2 titration to lowest effective dose. Pulse oximeter checks to shift and at HS to dose and titrate oxygen and aerosol treatments monitor for nocturnal hypoxemia, monitor vital signs, oxygen prn. Focus on energy conservation. eft a Pleurx tube in O2   drain  GERD-Elevate head of bed after meals, monitor stools for blood, lowest effective dose of PPI, consider Tums.   Anticoagulation coumadinization for [id number] pharmacist regarding INR management    Acute kidney failure-Eliminate toxic medications, monitor I's and O's focusing on urine output, recheck BMP. Spinal stenosis of lumbar region with neurogenic claudication    Balance disorder-focus on balance and therapy         I am especially concerned about their recent medical complexities. The patient's high risk medication use includes oxycodone. The patient is high risk for urinary tract infection, an admission urinalysis has been ordered. I will have the nurses check post void residual bladder volumes and place acatheter if excessive urine is retained in the bladder after voiding. The patient is risk for deep venous thromosis, complex deep venous thrombosis protocol prophylaxis has been ordered Coumadin. The patient is high risk for orthostasis and a hydration program and orthostatic blood pressure screening have been ordered. I will attempt to get old records from the patient's previous hospital stay. Care everywhere on [employer] was utilized. 3.  Current and previous medications were reviewed and summarized and compared to old medication lists from home and from the acute floor. 4.  Complex labs and x-rays were reviewed. I will review patient's old EKG and labs. 5.  Will provide emotional support for this patient regarding adjustment to their disability. Cognition and mood will be screened daily and addressed by rehabilitation psychology and/or speech therapy as appropriate. I have encouraged the patient to attend the Rehab Adjustment to Disability Support Group and recreational therapy. 6.  Estimated length of stay is   2-3 weeks. Discharge to home with help from family and home health PT, OT, RN, and aide. Patient should be independent at discharge. 7.  The patient's medical and rehab prognosis are good. [address] regarding the patient's back up to general medical needs.     A welcome letter was presented with an explanation of my services, my specialty and what to expect during the rehabilitation process. As well as introducing myself, I also wrote my name on their bedside marker board with their name as well as the names of the other physicians with an explanation of our individual roles in their care, as well as the rehab process.             Noah Rodriguez D.O., FRASHAD.&ALEX

## 2023-02-26 NOTE — PROGRESS NOTES
Warfarin Dosing - Pharmacy Consult Note  Consulting Provider: EDELMIRA Keys CNP    Indication:  Atrial Fibrillation  Warfarin Dose prior to admission: 4 mg T/TH/Sat/Sun, 5 mg M/W/F   Concurrent anticoagulants/antiplatelets: aspirin 81 mg  Significant Drug Interactions: No obvious interactions  Recent Labs     02/24/23  0431 02/24/23  0432 02/24/23  1141 02/25/23  0516 02/25/23  1154 02/26/23  0449 02/26/23  0526   INR 1.6  --   --  1.9  --  2.5  --    HGB  --  7.0*   < > 7.0* 7.2*  --  7.2*   PLT  --  200  --  233  --   --  243    < > = values in this interval not displayed. Recent warfarin administrations                     warfarin (COUMADIN) tablet 4 mg (mg) 4 mg Given 02/25/23 1808    warfarin (COUMADIN) tablet 5 mg (mg) 5 mg Given 02/24/23 1818    warfarin (COUMADIN) tablet 4 mg (mg) 4 mg Given 02/23/23 1749                   Date   INR    Dose  2/24       1.6        5 mg   2/25       1.9        4 mg   2/26       2.5        2 mg    Assessment/Plan  (Goal INR: 2 - 3)  INR is therapeutic for goal at 2.5  Despite therapeutic INR, INR has increased rapidly over the past 48 hours on patient's usual home dose. As such, will only order warfarin 2 mg for today  Lovenox bridge discontinued       Active problem list reviewed. INR orders are placed. Chart reviewed for pertinent labs, drug/diet interactions, and past doses. Documentation of patient's clinical condition was reviewed. Pharmacy Dosing:  Pharmacy will continue to follow.        Geovanna Jenkins, Henri  2/26/2023 12:09 PM

## 2023-02-26 NOTE — CONSULTS
Consult Note            Date:2/26/2023        Patient Name:Garth Ramesh Do     YOB: 1935     Age:87 y.o. Reason for Consult: Medical management of hypertension    Chief Complaint   Weakness     History Obtained From   patient    History of Present Illness   The patient is an 74-year-old male with significant past medical history of hypertension, PAF, CHF, with pacemaker , CAD, hyperlipidemia, TIA, arthritis, mesothelioma, lumbar postlaminectomy syndrome S/P attempted spinal cord stimulator permanent placement (02/16/23). Patient developed worsening shortness of breath, found to have large loculated pleural effusion and was started on empiric antibiotic therapy. Patient was supposed to be discharged, however fluid culture result came back positive and needing more days of IV antibiotic treatment. Currently, patient is admitted in inpatient rehab for PT, OT, and ST. Patient with complaint of SOB on exertion but denies fever, chills, dizziness, chest pain, and N/V/D.   Past Medical History     Past Medical History:   Diagnosis Date    A-fib ([facility]     approx 1 year ago-cardioverted    Arthritis     Baker's cyst of knee, left     Cancer ([address]     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    Cerebral artery occlusion with cerebral infarction ([address] 2000    TIA sx felt funny --     Congestive heart failure ([address] 03/08/2022    Coronary artery disease     HTN (hypertension)     meds > 20 yrs    Hx of blood clots 2012    DVT left leg     Hyperlipidemia     meds > 20 yrs    Pacemaker 07/19/2022    Polycythemia     Torn meniscus     left knee      Past Surgical History     Past Surgical History:   Procedure Laterality Date    BACK SURGERY      COLONOSCOPY  2009    COLONOSCOPY  2012    CORONARY ANGIOPLASTY WITH STENT PLACEMENT  10/2006    x 1 cardiac stent    ENDOSCOPY, COLON, DIAGNOSTIC      EYE SURGERY      [place] with IOL OU    HERNIA REPAIR  03/2013    redo ca mesh in [id number].4747 -- umbilical hernia    JOINT REPLACEMENT Bilateral 2009 & 2011    Bilateral TKR    KNEE SURGERY Left      arthroscopic surgery to repair meniscus of left knee    LAMINECTOMY N/A 02/08/2021    RIGHT BILATERAL L2-3 3-4 4-5 MICRODECOMPRESSION performed by Janak Ladd MD at [facility] 7/19/22    PAIN MANAGEMENT PROCEDURE N/A 2/16/2023    ATTEMPTED SPINAL CORD STIMULATOR PERMANENT PLACEMENT performed by Dustin Champagne MD at [address] N/A 1/10/2023    SPINAL CORD STIMULATOR TRIAL performed by Dustin Champagne MD at [place]  age 8    [address]  03/2012    UPPER GASTROINTESTINAL ENDOSCOPY N/A 2/21/2023    EGD DIAGNOSTIC ONLY performed by Fatoumata Lopez MD at [facility]        Medications     Prior to Admission medications    Medication Sig Start Date End Date Taking? Authorizing Provider   carboxymethylcellulose (THERATEARS) 1 % ophthalmic gel Apply to eye    Historical Provider, MD   melatonin 5 MG TABS tablet Take by mouth    Historical Provider, MD   enoxaparin (LOVENOX) 100 MG/ML Inject into the skin 2 times daily    Historical Provider, MD   metoprolol tartrate (LOPRESSOR) 50 MG tablet Take 25 mg by mouth 2 times daily    Historical Provider, MD   Elastic Bandages & Supports ([facility]) [address] 1 each by Does not apply route daily Thigh high 20-30 mmhg graduated compression stockings both legs wear daily during day and off Qhs. Wear as tolerated. Do not wear if they cause increased pain. 8/25/22   John Macias, APRN - CNP   losartan (COZAAR) 100 MG tablet Take 100 mg by mouth in the morning. Historical Provider, MD   potassium chloride (KLOR-CON M) 20 MEQ extended release tablet Take 20 mEq by mouth in the morning and 20 mEq before bedtime.     Historical Provider, MD   rosuvastatin (CRESTOR) 10 MG tablet TAKE 1 TABLET BY MOUTH EVERY DAY 2/24/22   Historical Provider, MD   furosemide (LASIX) 20 MG tablet Take 20 mg by mouth daily    Historical Provider, MD   warfarin (COUMADIN) 2.5 MG tablet Take 4 mg by mouth daily Indications: T-Th-Sat - Sun Daily per INR levels    Historical Provider, MD   nitroGLYCERIN (NITROSTAT) 0.4 MG SL tablet Place 0.4 mg under the tongue every 5 minutes as needed for Chest pain up to max of 3 total doses. If no relief after 1 dose, call 911. Historical Provider, MD   Omega-3 Fatty Acids (FISH OIL) 1200 MG CAPS Take by mouth daily    Historical Provider, MD   Cholecalciferol (VITAMIN D3) 125 MCG (5000 UT) TABS Take by mouth daily    Historical Provider, MD   CPAP Machine MISC by Does not apply route    Historical Provider, MD   warfarin (COUMADIN) 5 MG tablet Take 5 mg by mouth daily Indications: Jfdarp-Dymghncxo-Tfcjkl Daily per INR levels 2/26/13   Historical Provider, MD   aspirin 81 MG EC tablet Take 81 mg by mouth daily.       Historical Provider, MD        Vitamin D (CHOLECALCIFEROL) tablet 2,000 Units, Dinner  cyanocobalamin injection 1,000 mcg, Weekly  coenzyme Q10 capsule 100 mg, Daily  lidocaine 4 % external patch 3 patch, Daily  bisacodyl (DULCOLAX) suppository 10 mg, Daily PRN  sodium phosphate (FLEET) rectal enema 1 enema, Daily PRN  acetaminophen (TYLENOL) tablet 650 mg, Q4H PRN  aspirin EC tablet 81 mg, Daily  enoxaparin (LOVENOX) injection 120 mg, BID  furosemide (LASIX) tablet 20 mg, Daily  guaiFENesin (MUCINEX) extended release tablet 600 mg, BID  ipratropium-albuterol (DUONEB) nebulizer solution 1 ampule, Q4H PRN  melatonin disintegrating tablet 5 mg, Nightly  meropenem (MERREM) 1,000 mg in sodium chloride 0.9 % 100 mL IVPB (mini-bag), Q8H  metoprolol tartrate (LOPRESSOR) tablet 25 mg, BID  naloxegol (MOVANTIK) tablet 25 mg, QAM  ondansetron (ZOFRAN-ODT) disintegrating tablet 4 mg, Q8H PRN   Or  ondansetron (ZOFRAN) injection 4 mg, Q6H PRN  oxyCODONE (ROXICODONE) immediate release tablet 5 mg, Q4H PRN   Or  oxyCODONE (ROXICODONE) immediate release tablet 10 mg, Q4H PRN  rosuvastatin (CRESTOR) tablet 10 mg, Daily  warfarin (COUMADIN) tablet 4 mg, Once per day on Sun Tue Thu Sat  [START ON 2023] warfarin (COUMADIN) tablet 5 mg, Once per day on   warfarin placeholder: dosing by provider, RX Placeholder    sodium chloride flush 0.9 % injection 10 mL, PRN      Allergies   Benadryl [diphenhydramine hcl] and Diphenhydramine    Social History     Social History       Tobacco History       Smoking Status  Former Quit Date  1968 Smoking Frequency  0.50 packs/day for 10.00 years (5.00 pk-yrs) Smoking Tobacco Type  Cigarettes quit in 1968      Smokeless Tobacco Use  Never              Alcohol History       Alcohol Use Status  Yes Comment  social              Drug Use       Drug Use Status  Never              Sexual Activity       Sexually Active  Not Asked                    Family History     Family History   Problem Relation Age of Onset    Heart Attack Mother     Other Mother          in MVA    Other Father          at age 80    Other Sister          as infant    Cancer Brother     Other Brother         unknown medical hx    Other Brother          at age 61 due to accident    Cancer Daughter         colon & lung cancer    Colon Cancer Daughter     No Known Problems Son     Colon Cancer Other     Breast Cancer Other        Review of Systems   12 point review of systems reviewed with patient with pertinent positive listed in HPI, otherwise, negative. Physical Exam   /61   Pulse 78   Temp 99.3 °F (37.4 °C) (Oral)   Resp 19   Ht 5' 7\" (1.702 m)   Wt 261 lb 14.4 oz (118.8 kg)   SpO2 98%   BMI 41.02 kg/m²     CONSTITUTIONAL:  awake, alert, cooperative, no apparent distress, appears stated age, and morbidly obese  EYES:  sclera clear  ENT:  normocepalic, without obvious abnormality  NECK:  supple, symmetrical, trachea midline  BACK:  symmetric  LUNGS:  no increased work of breathing and diminished breath sounds right base and left base. On NC @ 4L. CARDIOVASCULAR:  Normal apical impulse, regular rate and rhythm, normal S1 and S2, no S3 or S4, and no murmur noted  ABDOMEN:  normal bowel sounds, distended, and non-tender  MUSCULOSKELETAL:  there is no redness, warmth, or swelling of the joints  NEUROLOGIC:  Awake, alert, oriented to name, place and time. Cranial nerves II-XII are grossly intact. SKIN:  normal skin color, texture, turgor and no redness, warmth, or swelling    Labs    CBC:  Recent Labs     02/24/23  0432 02/24/23  1141 02/25/23  0516 02/25/23  1154 02/26/23  0526   WBC 7.0  --  8.2  --  8.5   RBC 2.29*  --  2.44*  --  2.49*   HGB 7.0*   < > 7.0* 7.2* 7.2*   HCT 20.3*   < > 21.4* 21.7* 21.8*   MCV 88.7  --  87.5  --  87.4   RDW 15.7*  --  16.0*  --  15.7*     --  233  --  243    < > = values in this interval not displayed. CHEMISTRIES:  Recent Labs     02/24/23  0431 02/25/23  0516 02/26/23  0449    142 140   K 3.5 3.3* 3.9   * 108* 108*   CO2 23 22 22   BUN 24* 22 20   CREATININE 1.35* 1.31* 1.25*   GLUCOSE 110* 97 109*   MG 2.1 2.2  --      PT/INR:  Recent Labs     02/24/23  0431 02/25/23  0516 02/26/23  0449   PROTIME 19.3* 22.0* 27.0*   INR 1.6 1.9 2.5     APTT:No results for input(s): APTT in the last 72 hours. LIVER PROFILE:No results for input(s): AST, ALT, BILIDIR, BILITOT, ALKPHOS in the last 72 hours. Imaging/Diagnostics   CT CHEST WO CONTRAST    Result Date: 2/23/2023  Indwelling chest tube identified on the left with small left pleural effusion and trace anterior pneumothorax. Consolidation seen in airspace disease throughout the left upper and lower lobes to suggest a multifocal superimposed pneumonia. Soft tissue density seen in the hilar region to suggest possible reactive adenopathy. Moderate-sized pericardial effusion. Minimal atelectatic changes seen at the right lung base. Incidental stones in the gallbladder with small hiatal hernia.      US DUP UPPER EXTREMITY RIGHT VENOUS    Result Date: 2/20/2023  No evidence of DVT. Assessment      Hospital Problems             Last Modified POA    * (Principal) Impaired mobility and activities of daily living dt CP debility 2/26/2023 Yes    Mixed hyperlipidemia 2/26/2023 Yes    Gastritis without bleeding 2/26/2023 Yes    Pneumonia of both lungs due to infectious organism 2/26/2023 Yes    Acute kidney failure ([address] 2/26/2023 Yes    Spinal stenosis of lumbar region with neurogenic claudication 2/26/2023 Yes    Atherosclerosis of native artery of both lower extremities with intermittent claudication ([address] 2/26/2023 Yes    Atrial fibrillation ([address] 2/26/2023 Yes    Overview Signed 2/2/2021  3:01 PM by EDELMIRA Garcia - CNP     Past cardioversion         Venous insufficiency 2/26/2023 Yes    Balance disorder 2/26/2023 Yes       Plan     1. Impaired mobility and activities of daily living due to CP debility - Dr. Eloise Fermin. 2.  Loculated pleural effusion in the setting of mesothelioma - Dr. Lavell Raymundo, ID and Dr. Leandra Castañeda, cardiothoracic following. Initial fluid culture (02/19/23) positive for Staphylococcus epidermidis. For repeat fluid culture from Pleurx catheter. WBC normal at 8.5. Afebrile 99.3. On meropenem. 3.  Paroxysmal atrial fibrillation - cardiology consulted. EKG (02/26/23) showed sinus rhythm without QT prolongation, 0.37. On Coumadin with consult to pharmacy for dosing. Latest INR 2.5. On metoprolol. 4.  Essential hypertension, CHF, CAD, hyperlipidemia - blood pressure within acceptable range, latest blood pressure 108/61. On Lasix and Crestor. 5.  Arthritis - On lidocaine patch, with PRN oxycodone. Thank you for consult. Hospital medicine managing acute needs. Patient will need to follow up with PCP for chronic disease management. Time spent evaluating and intervening patient, 35 minutes.  Greater than 70% of time spent focused exclusively on this patient, reviewing chart, reconciling medications, and answering questions and discussing treatment plan.     Electronically signed by EDELMIRA Irizarry CNP on 2/26/23 at 10:14 AM EST

## 2023-02-26 NOTE — PROGRESS NOTES
Pt walked to bathroom this am with walker and 02 at 2 liters. Sob with exertion. Pt voided and had med bm. Pt complains of 9/10 sharp pain to left shoulder. Pt requests pain med. Roxicodone 10 mg given this am for pain. 1+ edema noted to feet. Electronically signed by Sunday Walker.  Joon Onofre on 2/26/2023 at 5:55 AM

## 2023-02-26 NOTE — PLAN OF CARE
Problem: Discharge Planning  Goal: Discharge to home or other facility with appropriate resources  Outcome: Progressing  Flowsheets (Taken 2/25/2023 1731 by Segundo Breaux, RN)  Discharge to home or other facility with appropriate resources:   Identify barriers to discharge with patient and caregiver   Identify discharge learning needs (meds, wound care, etc)   Refer to discharge planning if patient needs post-hospital services based on physician order or complex needs related to functional status, cognitive ability or social support system     Problem: Pain  Goal: Verbalizes/displays adequate comfort level or baseline comfort level  Outcome: Progressing     Problem: Safety - Adult  Goal: Free from fall injury  Outcome: Progressing     Problem: ABCDS Injury Assessment  Goal: Absence of physical injury  Outcome: Progressing  Flowsheets (Taken 2/25/2023 1728 by Segundo Breaux, RN)  Absence of Physical Injury: Implement safety measures based on patient assessment

## 2023-02-26 NOTE — PROGRESS NOTES
Assumed care of pt at [address] [place] [address] on 2/25/23. Pt in bed getting breathing treatment from RT. Pt alert and oriented x 4. Temp at hs 99. Telemetry on . Rhythm is NSR with occasional paced beat. Pt's lung sounds diminished bilaterally. Sob with exertion. 02 on at 2 liters per nasal cannula. PT states that he does not use 02 at home. Cpap on at hs with 02 2 liters. Temp at hs was 99. Plurex catheter to left chest intact dressing dry and intact. Pt complained of pain to left shoulder 7/10. Roxicodone 10 mg po for pain at 2052. Pt cooperative and pleasant tonight. IV to right hand patent. Merrem 1,000 mg infused without difficulty. Call light in reach and bed alarm on Electronically signed by Solo Castillo.  Bessy Aguirre on 2/26/2023 at 5:40 AM

## 2023-02-26 NOTE — PROGRESS NOTES
Facility/Department: [facility] Initial Assessment: Occupational Therapy  [facility]    NAME: Ulysses Admire  : 1935  MRN: [id number]    Date of Service: 2023    Rehab Diagnosis(es): Impaired mobility and ADL's due to severe pulmonary debility  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    Acquired absence of lung 10/12/2022    Presence of cardiac pacemaker 09/15/2022    Sick sinus syndrome ([address] 2022    Acute kidney failure ([address] 2022    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    Mixed hyperlipidemia 2022    SOB (shortness of breath) 10/13/2021    Congestive heart failure ([address] 2022    Lumbar spondylosis 2022    Balance disorder 2022    Lumbar stenosis with neurogenic claudication 2021    Atherosclerosis of native artery of both lower extremities with intermittent claudication ([address] 2021    Atrial fibrillation ([address] 2021    Venous insufficiency 2021    Sleep apnea 2021    Excessive daytime sleepiness 2021    Spinal stenosis of lumbar region with neurogenic claudication 2021    Transient ischemic attack 2019    Polycythemia 2018    Intermittent claudication ([address] 12/10/2018    Rosacea 2018    Fuchs' corneal dystrophy 2016    Ventral hernia, recurrent 2013    Ventral hernia 2012    Hx of blood clots 2012    Former smoker     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       Past Medical History:   Diagnosis Date    A-fib ([facility]     approx 1 year ago-cardioverted    Arthritis     Baker's cyst of knee, left     Cancer ([facility]     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    Cerebral artery occlusion with cerebral infarction ([facility] 2000    TIA sx felt funny --     Congestive heart failure ([facility] 03/08/2022    Coronary artery disease     HTN (hypertension)     meds > 20 yrs    Hx of blood clots 2012    DVT left leg     Hyperlipidemia     meds > 20 yrs    Pacemaker 07/19/2022    Polycythemia     Torn meniscus     left knee     Past Surgical History:   Procedure Laterality Date    BACK SURGERY      COLONOSCOPY  2009    COLONOSCOPY  2012    CORONARY ANGIOPLASTY WITH STENT PLACEMENT  10/2006    x 1 cardiac stent    ENDOSCOPY, COLON, DIAGNOSTIC      EYE SURGERY      Phaco with IOL OU    HERNIA REPAIR  03/2013    redo ca mesh in [address].7494 -- umbilical hernia    JOINT REPLACEMENT Bilateral 2009 & 2011    Bilateral TKR    KNEE SURGERY Left      arthroscopic surgery to repair meniscus of left knee    LAMINECTOMY N/A 02/08/2021    RIGHT BILATERAL L2-3 3-4 4-5 MICRODECOMPRESSION performed by Winsome Christopher MD at [facility] 7/19/22    PAIN MANAGEMENT PROCEDURE N/A 2/16/2023    ATTEMPTED SPINAL CORD STIMULATOR PERMANENT PLACEMENT performed by Phi Crews MD at [address] N/A 1/10/2023    SPINAL CORD STIMULATOR TRIAL performed by Phi Crews MD at [place]  age 8    [address]  03/2012    UPPER GASTROINTESTINAL ENDOSCOPY N/A 2/21/2023    EGD DIAGNOSTIC ONLY performed by Chaparro Crawford MD at [facility]       Restrictions:  Restrictions/Precautions  Restrictions/Precautions: Fall Risk  Position Activity Restriction  Other position/activity restrictions: pleurx on L side    Subjective:  General  Referring Practitioner: Dr. Shaila Reyes  Diagnosis: Impaired mobility and ADL's due to severe pulmonary debility    Patient's date of birth confirmed: Yes     Pain at start of treatment: Yes: 8-9/10    Pain at end of treatment: Yes: 8-9/10    Location: L shoulder   Pain description: ache  Dr. Chase notified upon making rounds    Social Functional:  Social/Functional History  Lives With: Spouse  Type of Home: House  Home Layout: One level (with basement; rarely goes down to basement)  Home Access: Stairs to enter without rails  Entrance Stairs - Number of Steps: 2-3  Bathroom Shower/Tub: Walk-in shower  Bathroom Toilet: Handicap height  Home Equipment: Walker, rolling;Rollator;Cane  ADL Assistance: Independent (occasional assist with BM hygiene)  Homemaking Assistance: Needs assistance  Ambulation Assistance: Independent (RW in home and rollator in community)  Transfer Assistance: Independent  Active : No  Occupation: Retired  Type of Occupation: construction work  IADL Comments: spouse completes all cooking, cleaning, laundry, money management ; pt manages own meds    Objective:    Self Care Status:  ADL  Feeding: Stand by assistance  Grooming: Minimal assistance  UE Bathing: Minimal assistance  LE Bathing: Maximum assistance  LE Bathing Skilled Clinical Factors: periare only per pt request  UE Dressing: Moderate assistance  LE Dressing: Maximum assistance  Toileting: Maximum assistance  Additional Comments: sponge bath ADL completed sitting EOB and patially on toilet.  Toilet Transfers  Toilet - Technique: Ambulating  Equipment Used: Grab bars  Toilet Transfer: Moderate assistance  Toilet Transfers Comments: CGA on ModA off      Functional Mobility:  Balance  Sitting Balance: Stand by assistance  Standing Balance: Moderate assistance  Functional Mobility  Functional - Mobility Device: Rolling Walker  Activity: To/from bathroom  Assist Level: Minimal assistance  Functional Mobility Comments: increased time, intermittent rest breaks, occasional assist to manage RW through spaces for safety,  assist with O2 cord management  Apparatus Needs  Apparatus Needs: O2    Bed mobility and transfers:  Bed mobility  Supine to Sit: Moderate assistance  Transfers  Sit to stand: Moderate assistance  Stand to sit: Moderate assistance      Observation:  Observation/Palpation  Observation: SOB with min exertion on O2 NC. pt pleasant, cooperative    Orientation and Cognition:  Orientation  Overall Orientation Status: Within Functional Limits  Cognition  Overall Cognitive Status: WFL    Pt's current cognitive status is:  Comprehension: Mod I  Expression: Mod I  Social Interaction: Mod I  Problem Solving: SBA  Memory: SBA    Vision and Hearing:  Vision  Vision: Impaired  Vision Exceptions: Wears glasses at all times  Hearing  Hearing: Exceptions to [facility]  Hearing Exceptions: Hard of hearing/hearing concerns    Range of Motion:  LUE AROM (degrees)  LUE AROM : Exceptions  LUE General AROM: significantly limited proximally due to increased pain  Left Hand AROM (degrees)  Left Hand AROM: WFL  Left Hand General AROM: increased pain  RUE AROM (degrees)  RUE AROM : WFL  Right Hand AROM (degrees)  Right Hand AROM: WFL      Strength:  LUE Strength  Gross LUE Strength: Exceptions to [facility]  L Hand General: 2+/5  LUE Strength Comment: limited tolerance due to increased pain, not formally assessed  RUE Strength  Gross RUE Strength: WFL  R Hand General: 4/5    Quality of Movement: Tone RUE  RUE Tone: Normotonic  Tone LUE  LUE Tone: Normotonic  Coordination  Movements Are Fluid And Coordinated: No  Coordination and Movement Description: Fine motor impairments;Gross motor impairments; Left UE    Sensation:  Sensation  Overall Sensation Status: Impaired (decreased sensation LLE, LUE)    Hand Dominance  Hand Dominance: Right    Safety:  Safety Devices  Type of Devices:  All fall risk precautions in place    Assessment:  Activity Tolerance  Activity Tolerance: Patient limited by fatigue    Assessment  Performance deficits / Impairments: Decreased functional mobility ; Decreased safe awareness;Decreased balance;Decreased ADL status; Decreased endurance;Decreased strength;Decreased fine motor control;Decreased coordination  Assessment: Pt is an 82 yr old male [facility] with the above functional deficits. Pt would benefit from occupational therapy services to maximize safety and independence with ADL tasks, improve overall strength/endurance and balance for functional tasks. Prognosis: Good  Decision Making: Medium Complexity  History: multiple  Exam: 8 perf deficits  Assistance / Modification: maxA  Discharge Recommendations: Continue to assess pending progress  Plan  REQUIRES OT FOLLOW-UP: Yes    Equipment needed:  OT Equipment Recommendations  Other: continue to assess    OT Education:  Education Given To: Patient  Education Provided: Role of Therapy, Plan of Care  Education Method: Verbal  Barriers to Learning: None  Education Outcome: Verbalized understanding      Plan:  Occupational Therapy Plan  Times Per Week: 5-7x/wk  Therapy Duration:  (1.5-2wks)  Current Treatment Recommendations: Balance training;Functional mobility training;Strengthening;ROM;Endurance training; Safety education & training;Patient/Caregiver education & training;Equipment evaluation, education, & procurement;Home management training;Self-Care / ADL; Coordination training    Goals:  Patient goals : to get stronger and possibly return to driving    Time Frame for Long Term Goals : within 1.5-2wks pt will demosntrate progress in the following areas to achieve specific LTG's listed in the initial eval  Long Term Goal 1: improve overall strength/endurance for functional tasks.   Long Term Goal 2: improve independence with self care  Long Term Goal 3: improve sitting/standing balance for ADL tasks  Long Term Goal 4: improve functional mobility with LRD for safe item transport/retrieval  Long Term Goal 5: improve FMC to independently manipulate fasteners    - Patient will complete self care as followed using the recommended adaptive equipment and/or adaptive techniques as instructed:  Feeding: Mod I  Grooming: Mod I  Bathing: SBA  UE Dressing: Supervision  LE Dressing: SBA  Footwear: SBA  Toileting: SBA  Toilet Transfer: Supervision  Shower/Tub Transfer: Supervision  - Patient will sequence self-care routine with sup and   verbal/tactile cues  - Patient will improve L UE sensation and/or utilize compensatory techniques for safe completion of self-care as projected. - Patient will improve static and dynamic standing balance to complete pants management at SBA level  - Patient will improve L UE Function (AROM, strength, motor control, tone normalization) to complete ADLs as projected. - Patient will improve functional endurance to tolerate/complete 60 minutes of ADLs. - Patient will improve B UE strength and endurance to by 1/2 grade in order to participate in self-care activities as projected. - Patient will improve B hand fine motor coordination to [facility] in order to manage clothing fasteners/self-care containers in a timely manner  - Patient will perform kitchen mobility at device level without episodes of LOB and good safety awareness   - Patient will perform basic room mobility at SUP level. - Patient will access appropriate D/C site with as few architectural barriers as possible. - Patient and/or caregiver will demonstrate understanding of energy conservation techniques with out verbal/tactile cues. - Patient and/or caregiver will demonstrate understanding of recommended HEP for ROM. - Patient's cognition will improve or maintain baseline to safely perform ADLs:  Comprehension: Mod I  Expression:  Mod I  Social Interaction: Mod I  Problem Solving: Supervision  Memory: Supervision  -Patient will participate in [facility] cognitive assessment     Therapy Time:   Individual   Time In 1037   Time Out 1115   Minutes 38            Eval: 38 minutes     Electronically signed by:    Ashu Campbell Neena, OT,   2/26/2023, 12:16 PM

## 2023-02-26 NOTE — PROGRESS NOTES
[facility]\""   Facility/Department: [facility]  Speech Language Pathology  Clinical Bedside Swallow Evaluation    NAME:Garth Dove  : 1935 (82 y.o.)   [x]   confirmed    MRN: [id number]  ROOM: [place] [address]  ADMISSION DATE: 2023  PATIENT DIAGNOSIS(ES): Impaired mobility and ADLs [Z74.09, Z78.9]  No chief complaint on file.     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    Acquired absence of lung 10/12/2022    Presence of cardiac pacemaker 09/15/2022    Sick sinus syndrome ([address] 2022    Acute kidney failure ([address] 2022    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    Mixed hyperlipidemia 2022    SOB (shortness of breath) 10/13/2021    Congestive heart failure ([address] 2022    Lumbar spondylosis 2022    Balance disorder 2022    Lumbar stenosis with neurogenic claudication 2021    Atherosclerosis of native artery of both lower extremities with intermittent claudication ([address] 2021    Atrial fibrillation ([address] 2021    Venous insufficiency 2021    Sleep apnea 2021    Excessive daytime sleepiness 2021    Spinal stenosis of lumbar region with neurogenic claudication 2021    Transient ischemic attack 2019    Polycythemia 2018    Intermittent claudication ([address] 12/10/2018    Rosacea 2018    Fuchs' corneal dystrophy 2016    Ventral hernia, recurrent 2013    Ventral hernia 2012    Hx of blood clots     Former smoker     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     Past Medical History:   Diagnosis Date    A-fib [address]     approx 1 year ago-cardioverted    Arthritis     Baker's cyst of knee, left     Cancer ([facility] [address]     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    Cerebral artery occlusion with cerebral infarction ([facility] 2000    TIA sx felt funny --     Congestive heart failure ([facility] 03/08/2022    Coronary artery disease     HTN (hypertension)     meds > 20 yrs    Hx of blood clots 2012    DVT left leg     Hyperlipidemia     meds > 20 yrs    Pacemaker 07/19/2022    Polycythemia     Torn meniscus     left knee     Past Surgical History:   Procedure Laterality Date    BACK SURGERY      COLONOSCOPY  2009    COLONOSCOPY  2012    CORONARY ANGIOPLASTY WITH STENT PLACEMENT  10/2006    x 1 cardiac stent    ENDOSCOPY, COLON, DIAGNOSTIC      EYE SURGERY      Phaco with IOL OU    HERNIA REPAIR  03/2013    redo ca mesh in [address].5744 -- umbilical hernia    JOINT REPLACEMENT Bilateral 2009 & 2011    Bilateral TKR    KNEE SURGERY Left      arthroscopic surgery to repair meniscus of left knee    LAMINECTOMY N/A 02/08/2021    RIGHT BILATERAL L2-3 3-4 4-5 MICRODECOMPRESSION performed by Lea Flores MD at [facility] 7/19/22    PAIN MANAGEMENT PROCEDURE N/A 2/16/2023    ATTEMPTED SPINAL CORD STIMULATOR PERMANENT PLACEMENT performed by Jaiden Kumar MD at [address] N/A 1/10/2023    SPINAL CORD STIMULATOR TRIAL performed by Jaiden Kumar MD at [place]  age 8    [address]  03/2012    UPPER GASTROINTESTINAL ENDOSCOPY N/A 2/21/2023    EGD DIAGNOSTIC ONLY performed by Prem Austin MD at [facility]     Allergies   Allergen Reactions    Benadryl [Diphenhydramine Hcl] Anxiety    Diphenhydramine Anxiety     anxious       Date of Onset: 2/25/2023  Rehab Diagnosis:      Date of Evaluation: 2/26/2023   Evaluating Therapist: Shantel Horner SLP    Dysphagia Diagnosis  Dysphagia Diagnosis: Swallow function appears [facility]  Dysphagia Impression : Oral and pharyngeal phases appear to be [facility] at this time. Recommended Diet  Recommendations: Setup assist  Diet Solids Recommendation: Regular  Liquid Consistency Recommendation: Thin  Recommended Form of Meds: PO  Compensatory Swallowing Strategies : Alternate solids and liquids;Eat/Feed slowly;Upright as possible for all oral intake;Small bites/sips      Reason for Referral  Charla Santizo was referred for a bedside swallow evaluation to assess the efficiency of his swallow function, identify signs and symptoms of aspiration, identify risk factors, and make recommendations regarding safe dietary consistencies, effective compensatory strategies, and safe eating environment. General  Chart Reviewed: Yes  Subjective  Subjective: Pt alert and cooperative for assessment. Behavior/Cognition: Alert; Cooperative;Pleasant mood  Respiratory Status: O2 via nasual cannula  O2 Device: Nasal cannula  Liters of Oxygen: 4 L  Communication Observation: Functional  Follows Directions: Simple  Dentition: Dentures top;Dentures bottom  Patient Positioning: Upright in bed  Baseline Vocal Quality: Normal  Prior Dysphagia History: Pt reports no prior history of dysphagia. Consistencies Administered: Pureed; Soft and Bite-Sized; Regular; Thin - cup; Thin - straw    Vision and Hearing  Vision  Vision Exceptions: Wears glasses at all times  Hearing  Hearing: Exceptions to [facility]  Hearing Exceptions: Hard of hearing/hearing concerns;Bilateral hearing aid    Current Diet level  Current Diet : Regular  Current Liquid Diet : Thin    Oral Motor  Labial: No impairment  Dentition: Upper dentures; Lower dentures  Oral Hygiene: Moist;Clean  Lingual: No impairment  Velum: No Impairment  Mandible: No impairment    Oral/Pharyngeal Phase  Oral Phase - Comment: Oral phase appears to be [facility] at this time.   Pt demonstrated adequate bolus acceptance, mastication, and A-P transit for all consistencies.  No residue was observered for all trials across all consistencies.  Pharyngeal Phase: Pharyngeal phase appears to be WFL at this time.  Pt demonstrated timely swallow with no overt s/s of aspiration observed at this time.  Hyolaryngeal elevation present for all trials.    PO Trials  Neuromuscular Estim Used: No  Assessment Method(s): Observation;Palpation  Patient Position: Pt upright in bed.  Consistency Presented: Pureed  How Presented: Spoon;Self-fed/presented  How much presented:  (2 trials)  Bolus Acceptance: No impairment  Bolus Formation/Control: No impairment  Propulsion: No impairment  Oral Residue: None  Initiation of Swallow: No impairment  Aspiration Signs/Symptoms: None  Pharyngeal Phase Characteristics: No impairment, issues, or problems  Additional PO Trials: 3      PO Trials 2  Consistency Presented: Soft & Bite Sized  How Presented: Self-fed/presented;Spoon  How much presented:  (4 trials)  Bolus Acceptance: No impairment  Bolus Formation/Control: No impairment  Propulsion: No impairment  Oral Residue: None  Initiation of Swallow: No impairment  Aspiration Signs/Symptoms: None  Pharyngeal Phase Characteristics: No impairment, issues, or problems      PO Trials 3  Consistency Presented: Regular  How Presented: Self-fed/presented  How much presented:  (4 trials)  Bolus Acceptance: No impairment  Bolus Formation/Control: No impairment  Propulsion: No impairment  Oral Residue: None  Initiation of Swallow: No impairment  Aspiration Signs/Symptoms: None  Pharyngeal Phase Characteristics: No impairment, issues, or problems      PO Trials 4  Consistency Presented: Thin  How Presented: Straw;Cup/sip  How much presented:  (4 ounces)  Bolus Acceptance: No impairment  Bolus Formation/Control: No impairment  Propulsion: No impairment  Oral Residue: None  Initiation of Swallow: No impairment  Aspiration Signs/Symptoms:  None      Dysphagia Diagnosis  Dysphagia Diagnosis: Swallow function appears [facility]  Dysphagia Impression : Oral and pharyngeal phases appear to be [facility] at this time. Recommendations  Requires SLP Intervention: No  Recommendations: Setup assist  D/C Recommendations: No follow up therapy recommended post discharge  Diet Solids Recommendation: Regular  Liquid Consistency Recommendation: Thin  Compensatory Swallowing Strategies : Alternate solids and liquids;Eat/Feed slowly;Upright as possible for all oral intake;Small bites/sips  Recommended Form of Meds: PO  Duration of Treatment: n/a  Frequency of Treatment: n/a    Prognosis  Speech Therapy Prognosis  Prognosis: Good    Education  Individuals consulted  Consulted and agree with results and recommendations: Patient;Physician    [x]  Dr. Melanie Peoples notified via voicemail      Treatment/Goals  Short-term Goals  Timeframe for Short-term Goals: n/a  Long-term Goals  Timeframe for Long-term Goals: n/a    Safety Devices  Safety Devices  Safety Devices in place: Yes  Type of devices: Bed alarm in place;Call light within reach  Restraints Initially in Place: No    Pain Assessment  Patient does not c/o pain. Pain Re-assessment  Patient does not c/o pain.       Therapy Time  SLP Individual Minutes  Time In: 6939  Time Out: 1000  Minutes: 10              Signature: Electronically signed by GEOFF Agosto on 2/26/2023 at 10:54 AM

## 2023-02-27 LAB
INR BLD: 2.9
PROTHROMBIN TIME: 30.1 SEC (ref 12.3–14.9)
SARS-COV-2, NAAT: NOT DETECTED

## 2023-02-27 PROCEDURE — 87205 SMEAR GRAM STAIN: CPT

## 2023-02-27 PROCEDURE — 2700000000 HC OXYGEN THERAPY PER DAY

## 2023-02-27 PROCEDURE — 1180000000 HC REHAB R&B

## 2023-02-27 PROCEDURE — 97129 THER IVNTJ 1ST 15 MIN: CPT

## 2023-02-27 PROCEDURE — 97112 NEUROMUSCULAR REEDUCATION: CPT

## 2023-02-27 PROCEDURE — 36415 COLL VENOUS BLD VENIPUNCTURE: CPT

## 2023-02-27 PROCEDURE — 6370000000 HC RX 637 (ALT 250 FOR IP): Performed by: PHYSICAL MEDICINE & REHABILITATION

## 2023-02-27 PROCEDURE — 99233 SBSQ HOSP IP/OBS HIGH 50: CPT | Performed by: PHYSICAL MEDICINE & REHABILITATION

## 2023-02-27 PROCEDURE — 2580000003 HC RX 258: Performed by: FAMILY MEDICINE

## 2023-02-27 PROCEDURE — 97535 SELF CARE MNGMENT TRAINING: CPT

## 2023-02-27 PROCEDURE — 87635 SARS-COV-2 COVID-19 AMP PRB: CPT

## 2023-02-27 PROCEDURE — 97530 THERAPEUTIC ACTIVITIES: CPT

## 2023-02-27 PROCEDURE — 6360000002 HC RX W HCPCS: Performed by: FAMILY MEDICINE

## 2023-02-27 PROCEDURE — 85610 PROTHROMBIN TIME: CPT

## 2023-02-27 PROCEDURE — 97130 THER IVNTJ EA ADDL 15 MIN: CPT

## 2023-02-27 PROCEDURE — 87070 CULTURE OTHR SPECIMN AEROBIC: CPT

## 2023-02-27 PROCEDURE — 97116 GAIT TRAINING THERAPY: CPT

## 2023-02-27 PROCEDURE — 6370000000 HC RX 637 (ALT 250 FOR IP): Performed by: FAMILY MEDICINE

## 2023-02-27 RX ORDER — LIDOCAINE 4 G/G
3 PATCH TOPICAL DAILY PRN
Status: DISCONTINUED | OUTPATIENT
Start: 2023-02-27 | End: 2023-03-13 | Stop reason: HOSPADM

## 2023-02-27 RX ORDER — WARFARIN SODIUM 5 MG/1
2.5 TABLET ORAL
Status: COMPLETED | OUTPATIENT
Start: 2023-02-27 | End: 2023-02-27

## 2023-02-27 RX ORDER — POLYVINYL ALCOHOL 14 MG/ML
1 SOLUTION/ DROPS OPHTHALMIC 2 TIMES DAILY PRN
Status: DISCONTINUED | OUTPATIENT
Start: 2023-02-27 | End: 2023-03-13 | Stop reason: HOSPADM

## 2023-02-27 RX ADMIN — METOPROLOL TARTRATE 25 MG: 25 TABLET, FILM COATED ORAL at 08:18

## 2023-02-27 RX ADMIN — MEROPENEM 1000 MG: 1 INJECTION, POWDER, FOR SOLUTION INTRAVENOUS at 20:32

## 2023-02-27 RX ADMIN — Medication 2000 UNITS: at 17:39

## 2023-02-27 RX ADMIN — GUAIFENESIN 600 MG: 600 TABLET ORAL at 08:17

## 2023-02-27 RX ADMIN — MEROPENEM 1000 MG: 1 INJECTION, POWDER, FOR SOLUTION INTRAVENOUS at 11:55

## 2023-02-27 RX ADMIN — Medication 5 MG: at 20:26

## 2023-02-27 RX ADMIN — METOPROLOL TARTRATE 25 MG: 25 TABLET, FILM COATED ORAL at 20:26

## 2023-02-27 RX ADMIN — NALOXEGOL OXALATE 25 MG: 12.5 TABLET, FILM COATED ORAL at 08:17

## 2023-02-27 RX ADMIN — ASPIRIN 81 MG: 81 TABLET, COATED ORAL at 08:18

## 2023-02-27 RX ADMIN — ROSUVASTATIN CALCIUM 10 MG: 5 TABLET, FILM COATED ORAL at 08:17

## 2023-02-27 RX ADMIN — WARFARIN SODIUM 2.5 MG: 5 TABLET ORAL at 17:38

## 2023-02-27 RX ADMIN — ACETAMINOPHEN 650 MG: 325 TABLET ORAL at 10:19

## 2023-02-27 RX ADMIN — Medication 100 MG: at 08:18

## 2023-02-27 RX ADMIN — OXYCODONE 5 MG: 5 TABLET ORAL at 02:58

## 2023-02-27 RX ADMIN — MEROPENEM 1000 MG: 1 INJECTION, POWDER, FOR SOLUTION INTRAVENOUS at 03:00

## 2023-02-27 RX ADMIN — GUAIFENESIN 600 MG: 600 TABLET ORAL at 20:26

## 2023-02-27 RX ADMIN — FUROSEMIDE 20 MG: 20 TABLET ORAL at 08:18

## 2023-02-27 ASSESSMENT — PAIN SCALES - GENERAL
PAINLEVEL_OUTOF10: 6
PAINLEVEL_OUTOF10: 6
PAINLEVEL_OUTOF10: 0

## 2023-02-27 ASSESSMENT — PAIN DESCRIPTION - ORIENTATION
ORIENTATION: LEFT
ORIENTATION: LEFT

## 2023-02-27 ASSESSMENT — PAIN DESCRIPTION - DESCRIPTORS
DESCRIPTORS: ACHING
DESCRIPTORS: ACHING

## 2023-02-27 ASSESSMENT — PAIN DESCRIPTION - LOCATION
LOCATION: SHOULDER
LOCATION: SHOULDER

## 2023-02-27 ASSESSMENT — PAIN - FUNCTIONAL ASSESSMENT: PAIN_FUNCTIONAL_ASSESSMENT: PREVENTS OR INTERFERES SOME ACTIVE ACTIVITIES AND ADLS

## 2023-02-27 NOTE — PROGRESS NOTES
Pt continues to be on 4 liters 02 n/c. Sob with exertion. Plurex catheter intact under dressing to left lower side of chest. to be drained Monday , Wed and Friday as ordered. Pt complains of pain to left shoulder and was medicated at hs with tylenol 650 mg and again at  0258 with Roxicodone 5 mg po for 6/10 pain. Pt's IV in right hand was painful when flushed and leaking. IV removed and new IV stared in right forearm with # 22 iv cath and Merrem 1000 mg IVPB was delayed in getting started so next dose will need to be rescheduled. Pt uses CPAP at hs with 02 on at 4 liters. Pt complains of pain to area around left ear where oxygen tubing lays. Area is reddened. Will contact RT for gray foam to cover tubing for comfort. Pt is cooperative and pleasant. Walks to bathroom with walker to void. Last Bm was 2/26/23. Telemetry shows pacing at rate of 76. Electronically signed by Lalita Moran.  Wilfrid Be on 2/27/2023 at 3:24 AM

## 2023-02-27 NOTE — PROGRESS NOTES
OCCUPATIONAL THERAPY  INPATIENT REHAB TREATMENT NOTE  Leah Lizbet      NAME: Julien Yip  : 1935 (82 y.o.)  MRN: [id number]  CODE STATUS: Full Code  Room: X027/R114-02    Date of Service: 2023    Referring Physician: Dr. Marquez See  Rehab Diagnosis: Impaired mobility and ADL's due to severe pulmonary debility    Restrictions  Restrictions/Precautions  Restrictions/Precautions: Fall Risk         Position Activity Restriction  Other position/activity restrictions: pleurx on L side    Patient's date of birth confirmed: Yes    SAFETY:  Safety Devices  Safety Devices in place: Yes  Type of devices: All fall risk precautions in place    SUBJECTIVE:  Subjective: \"It's just the sensation and feeling in this side because I had a TIA 2x's on this side. \"      COGNITION:  Orientation  Overall Orientation Status: Within Functional Limits  Orientation Level: Oriented X4  Cognition  Overall Cognitive Status: WFL  Cognition Comment: follows commands consistently      OBJECTIVE:     Provided 02 retrieval for pt to take to therapy room. Pt provided with opportunity to utilize bathroom and declined stating he did not need to go. Observed pt pt legs while placing them on w/c feet, and noticed they were swelling. Checked with pt  HÉCTOR Chavez for JOB hose on way to therapy room. Marianne provided XL size JOB hose to be placed on pt. Pt also told Scott that his R wrist was hurting/\"cramping\" when he was shaving in his room. Marianne RN will look into this and stated she would report this finding/complaint to the hospital doctor. Pt stated that he wears compression stockings and that his wife puts them on him at home. Provided nut and bolt task  Pt completed task seated using BUE hands. Pt was able to screw and unscrew the nut off bolt with 1-3rd digits utilizing bilateral hands/digits.   Pt demonstrated Mod difficulty utilizing LUE d/t impaired strength/sensation and therefore, required slow pacing to complete task.  Pt completed task to improve fine/gross motor coordination to continue improving pt ease and independence with functional ADL tasks. Equipment recommendations:  OT Equipment Recommendations  Other: continue to assess      ASSESSMENT:  Assessment: demo difficulty with  d/t sensory and  impairment on L side  Activity Tolerance: Patient tolerated treatment well      PLAN OF CARE:  Balance training, Functional mobility training, Strengthening, ROM, Endurance training, Safety education & training, Patient/Caregiver education & training, Equipment evaluation, education, & procurement, Home management training, Self-Care / ADL, Coordination training  Continue OT POC until discharge    Patient goals : to get stronger and possibly return to driving  Time Frame for Long Term Goals : within 1.5-2wks pt will demosntrate progress in the following areas to achieve specific LTG's listed in the initial eval  Long Term Goal 1: improve overall strength/endurance for functional tasks. Long Term Goal 2: improve independence with self care  Long Term Goal 3: improve sitting/standing balance for ADL tasks  Long Term Goal 4: improve functional mobility with LRD for safe item transport/retrieval  Long Term Goal 5: improve FMC to independently manipulate fasteners        Therapy Time:   Individual Group Co-Treat   Time In 1300       Time Out 1330         Minutes 30             Neuromuscular reeducation: 15 minutes  Therapeutic activities: 15 minutes     Electronically signed by:     HERMAN Vance,   2/27/2023, 1:40 PM

## 2023-02-27 NOTE — PROGRESS NOTES
Physical Therapy Rehab Treatment Note  Fac[facility]/Department: [facility]  [facility]       NAME: Sabas Duval  : 1935 (82 y.o.)  MRN: [id number]  CODE STATUS: Full Code    Date of Service: 2023     Restrictions:  Restrictions/Precautions: Fall Risk  Position Activity Restriction  Other position/activity restrictions: pleurx on L side    SUBJECTIVE:   Subjective: \"This oxygen is all new to me\"    Pain  Pain: no pain reported pre session 5/10 L shoulder post session (SPT contacting RN)    OBJECTIVE:     Roll Left  Assistance Level: Minimal assistance  Roll Right  Assistance Level: Minimal assistance  Sit to Supine  Assistance Level: Moderate assistance  Skilled Clinical Factors: Assist BLE into bed  Supine to Sit  Assistance Level: Moderate assistance  Skilled Clinical Factors: Assist trunk to seated position  Scooting  Assistance Level: Stand by assist    Sit to Stand  Assistance Level: Minimal assistance;Contact guard assist;Stand by assist  Skilled Clinical Factors: Level of assistance varies from surface to surface. Cues for hand placement intermittently  Stand to Sit  Assistance Level: Stand by assist  Skilled Clinical Factors: Cues for hand placement  Bed To/From Chair  Technique: Stand step (with ww)  Car Transfer  Assistance Level: Minimal assistance  Skilled Clinical Factors: Enrico to rise from low level chair able to lift BLE in and out of car    Ambulation  Surface: Carpet  Device: Rolling walker  Distance: 50' x 2  Activity: Within Unit  Activity Comments: Slow jefe increased lateral excursion, decreased heel strike L>R  Additional Factors: Verbal cues  Assistance Level: Contact guard assist  Gait Deviations: Slow jefe;Decreased heel strike left  Skilled Clinical Factors: Cues for upright posture/ increasing heel strike LLE    Stairs  Stair Height: 6''  Device: Bilateral handrails  Number of Stairs: 4  Additional Factors: Verbal cues; Non-reciprocal going up;Non-reciprocal going down  Assistance Level: Contact guard assist    ASSESSMENT/PROGRESS TOWARDS GOALS:   Assessment  Assessment: Level of assistance varies for transfers from surface to surface. SOB with gait and stair negotiation maintains 94% SpO2 on 4L. Activity Tolerance: Patient limited by fatigue;Patient limited by endurance  Discharge Recommendations: Continue to assess pending progress    Goals:  Long Term Goals  Long Term Goal 1: indep bed mobility  Long Term Goal 2: Pt will demonstrate transfers supervision with safest AD  Long Term Goal 3: Pt will demonstrate amb >/= 150ft supervision with safest AD  Long Term Goal 4: Pt will demonstrate stair negotiation 3 steps without rails with safest AD SBA  Long Term Goal 5: Pt will demonstrate pantoja balance assessment >/= 45/56 for decreased risk for falls. Patient Goals   Patient Goals : Shannon Bernadine out of chairs easier. drive a car.  stand for longer time. \"    PLAN OF CARE/Safety:   Safety Devices  Type of Devices: All fall risk precautions in place; Chair alarm in place      Therapy Time:   Individual   Time In 0900   Time Out 0935   Minutes 35     Therapy Time   Individual Concurrent Group Co-treatment   Time In 0945         Time Out 1000         Minutes 15            Missed 10 minutes due to doctor with patient    Minutes: 50  Transfer/Bed mobility trainin  Gait trainin[address] [place] [address], [facility], 23 at 11:58 AM

## 2023-02-27 NOTE — CARE COORDINATION
[facility]  INPATIENT REHABILITATION  TEAM CONFERENCE NOTE  Room: R252/R252-01  Admit Date: 2023       Date: 2023  Patient Name: Garth Rowe        MRN: [id number]    : 1935  (87 y.o.)  Gender: male        REHAB DIAGNOSIS:    Impaired mobility and ADL's due to severe pulmonary debility    CO MORBIDITIES:      Past Medical History:   Diagnosis Date    A-fib (HCC)     approx 1 year ago-cardioverted    Arthritis     Baker's cyst of knee, left     Cancer (HCC)     mesothelioma - radiation treatments    Cerebral artery occlusion with cerebral infarction (HCC)     TIA sx felt funny --     Congestive heart failure (HCC) 2022    Coronary artery disease     HTN (hypertension)     meds > 20 yrs    Hx of blood clots     DVT left leg     Hyperlipidemia     meds > 20 yrs    Pacemaker 2022    Polycythemia     Torn meniscus     left knee     Past Surgical History:   Procedure Laterality Date    BACK SURGERY      COLONOSCOPY      COLONOSCOPY      CORONARY ANGIOPLASTY WITH STENT PLACEMENT  10/2006    x 1 cardiac stent    ENDOSCOPY, COLON, DIAGNOSTIC      EYE SURGERY      [place] with IOL OU    HERNIA REPAIR  2013    redo ca mesh in Oct.2013 -- umbilical hernia    JOINT REPLACEMENT Bilateral  &     Bilateral TKR    KNEE SURGERY Left      arthroscopic surgery to repair meniscus of left knee    LAMINECTOMY N/A 2021    RIGHT BILATERAL L2-3 3-4 4-5 MICRODECOMPRESSION performed by Domi Carcamo MD at [facility] [place], [place] OR    PACEMAKER PLACEMENT      medtronic implanted 22    PAIN MANAGEMENT PROCEDURE N/A 2023    ATTEMPTED SPINAL CORD STIMULATOR PERMANENT PLACEMENT performed by Jeanmarie Bonilla MD at [facility] [place] [place] OR    SPINAL CORD STIMULATOR SURGERY N/A 1/10/2023    SPINAL CORD STIMULATOR TRIAL performed by Jeanmarie Bonilla MD at [facility] [place] [place] OR    TONSILLECTOMY  age 10    UMBILICAL HERNIA REPAIR  2012    UPPER GASTROINTESTINAL ENDOSCOPY N/A 2023    EGD DIAGNOSTIC ONLY  performed by Luli Alvarez MD at [facility]        Restrictions  Restrictions/Precautions: Fall Risk  Position Activity Restriction  Other position/activity restrictions: pleurx on L side  CASE MANAGEMENT    Social/Functional History  Social/Functional History  Lives With: Spouse  Type of Home: House  Home Layout: One level (with basement; rarely goes down to basement)  Home Access: Stairs to enter without rails  Entrance Stairs - Number of Steps: 2-3  Bathroom Shower/Tub: Walk-in shower, Doors  Bathroom Toilet: Handicap height  Bathroom Accessibility: Walker accessible, Wheelchair accessible  Home Equipment: Walker, rolling, Rollator, Cane (c-pap (brought in the one from home))  Has the patient had two or more falls in the past year or any fall with injury in the past year?: Yes  ADL Assistance: Independent  Homemaking Assistance: Needs assistance  Meal Prep Responsibility: No (wife completes)  Laundry Responsibility: No (wife completes)  Cleaning Responsibility: No (wife completes)  Bill Paying/Finance Responsibility: No (wife completes)  Shopping Responsibility: No (wife completes)  Health Care Management: Secondary (completes together with pill organizer)  Ambulation Assistance: Independent (RW in home and rollator in community)  Transfer Assistance: Independent  Active : No  Patient's  Info: wife  Mode of Transportation: Car  Education: trade school  Occupation: Retired  Type of Occupation: construction work  Leisure & Hobbies: Used to work on [employer]  IADL Comments: spouse completes all cooking, cleaning, laundry, money management ; pt manages own meds       Pts personal preferences: n/a    Pts assets/resources/support system: wife, children    COVERAGE INFORMATION:Payor: MEDICARE / Plan: MEDICARE PART A AND B / Product Type: *No Product type* /       NURSING  No active isolations    Weight: 261 lb 14.4 oz (118.8 kg) / Body mass index is 41.02 kg/m². ADULT DIET; Regular;  Low Sodium (2 gm)    SpO2: 97 % (02/27/23 0726)  O2 Flow Rate (L/min): 3 L/min (02/27/23 0726)    Oxygen to be continued upon discharge: Yes; 4L  Home-going needs (nocturnal Pox, sleep study, RX, equipment): Yes    Skin Issues: Yes; BUE bruising  Home-going needs (education, training, RX, Wound RN): No    Pain Managed: Yes; Tylenol and Deedee    Bladder continence: Yes  Discharging with Bishop: No   Training done: No   Urology following: No   Plan/date to remove bishop: No   Bladder retraining started: No    Bowel continence:  Yes  Last BM date: 2/26/23  Need for bowel program: No    Anticoagulants: Aspirin and Coumadin  Home-going needs (Education, labs): Yes    Antibiotics: Merrem IV  Stop date: 3/1 possible date--Dr. Jose Dominguez to confirm  Home-going needs (education, RX, PICC): No      Other: moist cough      PHYSICAL THERAPY  Bed mobility:  Bed mobility  Rolling to Left: Minimal assistance (02/26/23 1242)  Rolling to Right: Minimal assistance (02/26/23 1242)  Supine to Sit: Moderate assistance (02/26/23 1242)  Sit to Supine: Moderate assistance (02/26/23 1242)  Bed Mobility Comments: HOB flat; cues to avoid breath holding (02/26/23 1242)  Roll Left  Assistance Level: Minimal assistance (02/27/23 0942)  Roll Right  Assistance Level: Minimal assistance (02/27/23 0942)  Sit to Supine  Assistance Level: Moderate assistance (02/27/23 0942)  Skilled Clinical Factors: Assist BLE into bed (02/27/23 0942)  Supine to Sit  Assistance Level: Moderate assistance (02/27/23 0942)  Skilled Clinical Factors: Assist trunk to seated position (02/27/23 0942)  Scooting  Assistance Level: Stand by assist (02/27/23 6671)  Transfers:  Bed mobility  Rolling to Left: Minimal assistance (02/26/23 1242)  Rolling to Right: Minimal assistance (02/26/23 1242)  Supine to Sit: Moderate assistance (02/26/23 1242)  Sit to Supine:  Moderate assistance (02/26/23 1242)  Bed Mobility Comments: HOB flat; cues to avoid breath holding (02/26/23 1242)  Roll Left  Assistance Level: Minimal assistance (02/27/23 4117)  Roll Right  Assistance Level: Minimal assistance (02/27/23 0942)  Sit to Supine  Assistance Level: Moderate assistance (02/27/23 0942)  Skilled Clinical Factors: Assist BLE into bed (02/27/23 0942)  Supine to Sit  Assistance Level: Moderate assistance (02/27/23 0942)  Skilled Clinical Factors: Assist trunk to seated position (02/27/23 0942)  Scooting  Assistance Level: Stand by assist (02/27/23 6070)  Gait:   Ambulation  Surface: Level tile (02/26/23 1244)  Device: Rolling Walker (02/26/23 1244)  Other Apparatus: O2 (02/26/23 1244)  Assistance: Contact guard assistance (02/26/23 1244)  Quality of Gait: forward flexed trunk, heavy UE use and support, SOB , maintains unsafe distance inside AD (02/26/23 1244)  Gait Deviations: Slow Jefe; Increased MENA; Decreased step length;Decreased step height (02/26/23 1244)  Distance: 35ft (02/26/23 1244)  Ambulation  Surface: Carpet (02/27/23 1153)  Device: Rolling walker (02/27/23 1153)  Distance: 50' x 2 (02/27/23 1153)  Activity: Within Unit (02/27/23 1153)  Activity Comments: Slow jefe increased lateral excursion, decreased heel strike L>R (02/27/23 1153)  Additional Factors: Verbal cues (02/27/23 1153)  Assistance Level: Contact guard assist (02/27/23 1153)  Gait Deviations: Slow jefe;Decreased heel strike left (02/27/23 1153)  Skilled Clinical Factors: Cues for upright posture/ increasing heel strike LLE (02/27/23 1153)  Stairs:  Stairs/Curb  Stairs?: No (02/26/23 1244)  Stairs  Stair Height: 6'' (02/27/23 1153)  Device: Bilateral handrails (02/27/23 1153)  Number of Stairs: 4 (02/27/23 1153)  Additional Factors: Verbal cues; Non-reciprocal going up;Non-reciprocal going down (02/27/23 1153)  Assistance Level: Contact guard assist (02/27/23 0233)  W/C mobility:     LTG:  Long Term Goal 1: indep bed mobility  Long Term Goal 2: Pt will demonstrate transfers supervision with safest AD  Long Term Goal 3: Pt will demonstrate amb >/= 150ft supervision with safest AD  Long Term Goal 4: Pt will demonstrate stair negotiation 3 steps without rails with safest AD SBA  Long Term Goal 5: Pt will demonstrate pantoja balance assessment >/= 45/56 for decreased risk for falls. PT Treatment Time:  1.5 hrs      OCCUPATIONAL THERAPY    EVALUATION SELF CARE STATUS:  Hand Dominance: Right  Feeding: Stand by assistance (02/26/23 1201)  Grooming: Minimal assistance (02/26/23 1201)  UE Bathing: Minimal assistance (02/26/23 1201)  LE Bathing: Maximum assistance (02/26/23 1201)  LE Bathing Skilled Clinical Factors: periare only per pt request (02/26/23 1201)  UE Dressing: Moderate assistance (02/26/23 1201)  LE Dressing: Maximum assistance (02/26/23 1201)  Toileting: Maximum assistance (02/26/23 1201)  Additional Comments: sponge bath ADL completed sitting EOB and patially on toilet. (02/26/23 1201)             CURRENT SELF CARE:  Grooming/Oral Hygiene  Assistance Level: Modified independent (02/27/23 1201)  Skilled Clinical Factors: seated at sink with self s/u and clean up for denture care and grooming hair (02/27/23 1201)  Upper Extremity Bathing  Assistance Level: Moderate assistance (02/27/23 1201)  Skilled Clinical Factors: RUE arm pit and LUE assist (02/27/23 1201)  Lower Extremity Bathing  Assistance Level:  Moderate assistance (02/27/23 1201)  Skilled Clinical Factors: BLE lower legs/feet and buttocks in standing washed/dried (02/27/23 1201)  Upper Extremity Dressing  Equipment Provided: Dressing stick (02/27/23 1201)  Assistance Level: Minimal assistance (02/27/23 1201)  Skilled Clinical Factors: Sup to doff shirt but Min A to don shirt d/t LUE shoulder not able move within functional range and pt required assist to don shirt overhead to avoid catching arm and raising it higher than it can go (02/27/23 1201)  Lower Extremity Dressing  Assistance Level: Maximum assistance (02/27/23 1201)  Skilled Clinical Factors: SBA to doff underwear/pants using clothing stick with training; Max A to don underwear (TD over feet, max A to waist, and pants: Max A over feet and Min A to waist) (02/27/23 1201)  Putting On/Taking Off Footwear  Equipment Provided: Dressing stick (02/27/23 1201)  Assistance Level: Maximum assistance (02/27/23 1201)  Skilled Clinical Factors: Sup with training of dressing stick to doff socks and TD (A) to don socks (02/27/23 1201)  Toileting  Assistance Level: Moderate assistance (02/27/23 1201)  Skilled Clinical Factors: Mod A for waist < >knee pant/underwear management and TD (A) for posterior car to cleanse d/t having minimal loose stool (02/27/23 1201)  Toilet Transfers  Technique: Stand step (02/27/23 1201)  Equipment: Standard toilet (02/27/23 1201)  Assistance Level: Stand by assist (02/27/23 1201)  Skilled Clinical Factors: use of grab bars, standard toilet (02/27/23 1201)  Tub/Shower Transfers  Type: Shower (02/27/23 1201)  Transfer From: Rolling walker (02/27/23 1201)  Transfer To: Shower chair with back (02/27/23 1201)  Assistance Level: Contact guard assist (02/27/23 1201)        LTG:  Eating:Discharge Goal: Independent  Oral Hygiene:Discharge Goal: Independent  Shower/Bathe self: Discharge Goal: Supervision or touching assistance  Upper body dressing:Discharge Goal: Supervision or touching assistance  Lower body dressing:Discharge Goal: Supervision or touching assistance  Putting on taking off footwear:Discharge Goal: Supervision or touching assistance   Toileting: Discharge Goal: Supervision or touching assistance  Toilet transfer:Discharge Goal: Supervision or touching assistance  OT Treatment Time: 1.5 hrs      SPEECH THERAPY       Comprehension:  (AUditory comprehension is [facility])  Verbal Expression:  (Mild to moderate deficits noted with divergent and convergent naming tasks. Min assist required to improve naming skills.)      Diet/Swallow:             Compensatory Swallowing Strategies :  Alternate solids and liquids, Eat/Feed slowly, Upright as possible for all oral intake, Small bites/sips         LTG:  Long Term Goals  Time Frame for Long Term Goals: 2 weeks or LOS until goals are met. Goal 1: Pt will demonstrate functional cognitive-linguistic abilities in all opportunities with modified independence in order to safely complete ADLs. Long-term Goals  Timeframe for Long-term Goals: n/a          COGNITION  OT: Cognition Comment: follows commands consistently  SP:        RECREATIONAL THERAPY  Attendance to recreational therapy programs:    []  Pet Therapy  [] Music Therapy  [] Art Therapy    [] Recreation Therapy Group [] Support Group           Patient social interaction (mood, participation): good participation, motivated    Patient strengths: wife supportive, pt independent with ADLs     Patients goal: return home w/ wife    Problems/Barriers: SOB with all mobility, c/o L shoulder pain        1. Safety:          - Intervention / Plan:    [x]  falls protocol     [x]  PT/OT    [x]  SP        - Results:         2. Potential DME needs:         - Intervention / Plan:  [x]  PT/OT     [x]  Assess equipment needs/access       - Results:         3. Weakness:          - Intervention / Plan:  [x]  PT/OT      []  Other:         - Results:         4. Discharge planning needs:          - Intervention / Plan:  [x]  Weekly team conference      [x]  family training        - Results:         5.            - Intervention / Plan:          - Results:         6.            - Intervention / Plan:         - Results:         7.            - Intervention / Plan:         - Results:           Discharge Plan   Estimated Length of Stay: TBD    Tentative Discharge date: 3/12/23      Anticipated Discharge Destination:  Home      Team recommendations:    1. Follow up Therapy :    PT  OT  SLP  RN  [place] Aide    2. Home Health    Other:     Equipment needed at Discharge:  Other: TBD      Team Members Present at Conference:    Physician: Dr. Burgess Henson Worker: Kwabena Garcia ANALY Chery, LSW  RN: Tomasa Cantu RN  Physical Therapist: Shakira Cabrera PT  Occupational Therapist: Amrita Patel, [place]  Speech Therapist: Klaus Nagel, GEOFF      Electronically signed by ANALY Field, LSW on 2/27/2023 at 5:17 PM

## 2023-02-27 NOTE — CARE COORDINATION
Social/Functional Status:  Social/Functional History  Lives With: Spouse  Type of Home: House  Home Layout: One level (with basement; rarely goes down to basement)  Home Access: Stairs to enter without rails  Entrance Stairs - Number of Steps: 2-3  Bathroom Shower/Tub: Walk-in shower, Doors  Bathroom Toilet: Handicap height  Bathroom Accessibility: Walker accessible, Wheelchair accessible  Home Equipment: Walker, rolling, Rollator, Cane (c-pap (brought in the one from home))  Has the patient had two or more falls in the past year or any fall with injury in the past year?: Yes  ADL Assistance: Independent  Homemaking Assistance: Needs assistance  Meal Prep Responsibility: No (wife completes)  Laundry Responsibility: No (wife completes)  Cleaning Responsibility: No (wife completes)  Bill Paying/Finance Responsibility: No (wife completes)  Shopping Responsibility: No (wife completes)  Health Care Management: Secondary (completes together with pill organizer)  Ambulation Assistance: Independent (RW in home and rollator in community)  Transfer Assistance: Independent  Active : No  Patient's  Info: wife  Mode of Transportation: Car  Education: trade school  Occupation: Retired  Type of Occupation: construction work  Leisure & Hobbies: Used to work on [employer]  IADL Comments: spouse completes all cooking, cleaning, laundry, money management ; pt manages own meds      Spoke with patient and wife and explained role in the team. Patient questions answered appropriately. Explained discharge process. Patient stated understanding. 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 Pt and wife have 4 children that are local and supportive. Pt plans to return to his one level home with his wife where there are 2-3 YESICA w/o HR. Pt also has a basement but he does not go down there. Pt's bathroom is both ww and w/c accessible and has a walk in shower w/ doors and handicap height toilet.  Pt also has a ww, rollator, cane, and c-pap. Pt was not on O2 prior to hospitalization. Pt was independent with ADLs and wife completed IADLs. Pt did mention that they complete med mgmt together with a pill organizer. Pt is not an active  and his wife drives him via car. Pt uses a ww in the home and rollator in the community. Pt uses [facility] on [address] [place] [address] in [facility] as his pharmacy and has no difficulty affording medications. Pt gave permission for his wife to be updated.  Electronically signed by ANALY Arevalo, CHERW on 2/27/2023 at 1:06 PM

## 2023-02-27 NOTE — FLOWSHEET NOTE
Patient assessment is complete. Dr Yamile Conner came up this morning for a specimen form his pleural catheter for a culture. It was about 30 cc. Patient tolerated well and then went back to therapy. Patient tested negative for covid and the family was notified.

## 2023-02-27 NOTE — PROGRESS NOTES
Subjective: The patient complains of moderate to severe acute on chronic progressive fatigue and  PRIETO partially relieved by rest, medications, PT,  OT,   SLP and rest and exacerbated by recent illness  . Patient had initially been treated at [facility] [place] where a nerve stimulator placement was attempted but delayed due to high INR. Once the procedure was finally initiated it was aborted because of possible CSF leak. Patient became shortness of breath postprocedure but this was found to be largely unrelated to the procedure but related to a large loculated pleural effusion. Patient was transferred to [facility] for thoracotomy and VATS procedure performed by Dr. Russel Chisholm. As noted patient had a chest tube inserted by Dr. Russel Chisholm on 2/18/2023. He has been followed by thoracic surgery pulmonology oncology GI and cardiology as well as the hospitalist.     From a thoracic surgery standpoint cultures were sent from his pleural effusion which were positive and he is now on IV antibiotics. The left a Pleurx tube in O2   drain 3 times a week. The plan is for outpatient removal of the drain and if any worsening symptoms occur after the Pleurx is removed possible VATS surgery. They are trying to avoid surgery for now. Pulmonology followed him advising titrating O2 to keep sats above 90 continue home CPAP and continue draining the Pleurx daily. 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 He they noted his symptoms were improving and he has a history of following with Dr. Boris Menard at [facility] in [facility] regarding his mesothelioma. Discussed options regarding possible chemotherapy palliative care intermittent drainage of the Pleurx catheter and possible hospice care. Currently they are advising treating anemia with IV Venofer here.   Cardiology was consulted and obtain an echocardiogram to assess the pericardial effusion    I am concerned about patients medical complexities and barriers to advancing in rehab goals including  improved pain control. I reviewed current care and plans for further care with other rehab providers including nursing and case management. According to recent nursing note, \" Pt continues to be on 4 liters 02 n/c. Sob with exertion. Plurex catheter intact under dressing to left lower side of chest. to be drained Monday , Wed and Friday as ordered. Pt complains of pain to left shoulder and was medicated at hs with tylenol 650 mg and again at  0258 with Roxicodone 5 mg po for 6/10 pain. Pt's IV in right hand was painful when flushed and leaking. IV removed and new IV stared in right forearm with # 22 iv cath and Merrem 1000 mg IVPB was delayed in getting started so next dose will need to be rescheduled. Pt uses CPAP at hs with 02 on at 4 liters. Pt complains of pain to area around left ear where oxygen tubing lays. Area is reddened. Will contact RT for gray foam to cover tubing for comfort. Pt is cooperative and pleasant. Walks to bathroom with walker to void. Last Bm was 2/26/23. Telemetry shows pacing at rate of 76. Gray foam coverings applied to 02 tubing behind both ears. Pt resting quietly. \".    ROS x10: The patient also complains of severely impaired mobility and activities of daily living. Otherwise no new problems with vision, hearing, nose, mouth, throat, dermal, cardiovascular, GI, , pulmonary, musculoskeletal, psychiatric or neurological. See also Acute Rehab PM&R H&P. Vital signs:  BP (!) 105/53   Pulse 70   Temp 98.8 °F (37.1 °C) (Oral)   Resp 13   Ht 5' 7\" (1.702 m)   Wt 261 lb 14.4 oz (118.8 kg)   SpO2 97%   BMI 41.02 kg/m²   I/O:   PO/Intake:  fair PO intake,   Reg diet    Bowel:   continent   Bladder: continent    bishop  General:  Patient is well developed,   adequately nourished, and    well kempt. HEENT:    Pupils equal, hearing intact to loud voice, external inspection of ear and nose benign. Inspection of lips, tongue and gums  thrush    Musculoskeletal: No significant change in strength or tone. All joints stable. Inspection and palpation of digits and nails show no clubbing, cyanosis or inflammatory conditions. Neuro/Psychiatric: Affect: flat but pleasant. Alert and oriented to person, place and situation with  min cues. No significant change in deep tendon reflexes or sensation  Lungs:  Diminished, CTA-B. Respiration effort is   normal at rest.     Heart:   S1 = S2,   RRR. Abdomen:  Soft, non-tender, no enlargement of liver or spleen. Extremities:   mod lower extremity edema    Skin:   Intact to general survey,      Rehabilitation:  Physical Therapy:   Bed mobility:  Bed mobility  Rolling to Left: Minimal assistance (02/26/23 1242)  Rolling to Right: Minimal assistance (02/26/23 1242)  Supine to Sit: Moderate assistance (02/26/23 1242)  Sit to Supine: Moderate assistance (02/26/23 1242)  Bed Mobility Comments: HOB flat; cues to avoid breath holding (02/26/23 1242)  Roll Left  Assistance Level: Minimal assistance (02/27/23 0942)  Roll Right  Assistance Level: Minimal assistance (02/27/23 0942)  Sit to Supine  Assistance Level: Moderate assistance (02/27/23 0942)  Skilled Clinical Factors: Assist BLE into bed (02/27/23 0942)  Supine to Sit  Assistance Level: Moderate assistance (02/27/23 0942)  Skilled Clinical Factors: Assist trunk to seated position (02/27/23 0975)  Scooting  Assistance Level: Stand by assist (02/27/23 9948)  Transfers:  Transfers  Sit to Stand: Moderate Assistance (02/26/23 1243)  Stand to Sit: Moderate Assistance (02/26/23 1243)  Comment: poor use of L LE during sit to stand; [facility] used (02/26/23 1243)  Sit to Stand  Assistance Level: Minimal assistance;Contact guard assist;Stand by assist (02/27/23 4003)  Skilled Clinical Factors: Level of assistance varies from surface to surface.  Cues for hand placement intermittently (02/27/23 4465)  Stand to [address] Level: Stand by assist (02/27/23 4372)  Skilled Clinical Factors: Cues for hand placement (02/27/23 0942)  Bed To/From Chair  Technique: Stand step (with ww) (02/27/23 0942)  Gait:   Ambulation  Surface: Level tile (02/26/23 1244)  Device: Rolling Walker (02/26/23 1244)  Other Apparatus: O2 (02/26/23 1244)  Assistance: Contact guard assistance (02/26/23 1244)  Quality of Gait: forward flexed trunk, heavy UE use and support, SOB , maintains unsafe distance inside AD (02/26/23 1244)  Gait Deviations: Slow Lety; Increased MENA; Decreased step length;Decreased step height (02/26/23 1244)  Distance: 35ft (02/26/23 1244)  Stairs:  Stairs/Curb  Stairs?: No (02/26/23 1244)  W/C mobility:       Occupational Therapy:   Hand Dominance: Right  ADL  Feeding: Stand by assistance (02/26/23 1201)  Grooming: Minimal assistance (02/26/23 1201)  UE Bathing: Minimal assistance (02/26/23 1201)  LE Bathing: Maximum assistance (02/26/23 1201)  LE Bathing Skilled Clinical Factors: periare only per pt request (02/26/23 1201)  UE Dressing: Moderate assistance (02/26/23 1201)  LE Dressing: Maximum assistance (02/26/23 1201)  Toileting: Maximum assistance (02/26/23 1201)  Additional Comments: sponge bath ADL completed sitting EOB and patially on toilet. (02/26/23 1201)  Toilet Transfers  Toilet - Technique: Ambulating (02/26/23 1207)  Equipment Used: Grab bars (02/26/23 1207)  Toilet Transfer: Moderate assistance (02/26/23 1207)  Toilet Transfers Comments: CGA on ModA off (02/26/23 1207)          Speech Therapy:      Comprehension: Within Functional Limits  Verbal Expression: Exceptions to functional limits  Diet/Swallow:           Compensatory Swallowing Strategies :  Alternate solids and liquids, Eat/Feed slowly, Upright as possible for all oral intake, Small bites/sips          Lab/X-ray studies reviewed, analyzed and discussed with patient and staff:   Recent Results (from the past 24 hour(s))   Protime-INR    Collection Time: 02/27/23 5:01 AM   Result Value Ref Range    Protime 30.1 (H) 12.3 - 14.9 sec    INR 2.9        XR ABDOMEN   2/18/2023   1. Complete opacification of left hemithorax compatible with some combination of pleural effusion and atelectasis. Secondary obscuration of left heart border. 2.  Nonobstructive bowel gas pattern. No acute abdominal disease identified. 3.  Probable left renal stone. CT CHEST   2/23/2023 : Mediastinum: Thyroid is homogeneous in appearance. Vascular calcifications seen within the coronary vessels. Cardiac chambers are mildly enlarged. Pacer leads in satisfactory position. Moderate-sized pericardial effusion. Bulky adenopathy identified in the left hilar region likely reactive. Small lymph nodes seen scattered throughout the mediastinum likely reactive. Atherosclerotic disease seen diffusely throughout the thoracic aorta. Lungs/pleura: There is small chest tube identified in place on the left with small left pleural effusion. Airspace consolidation seen within the left upper and lower lung fields suggesting superimposed infiltrate such as pneumonia. Mild increased markings identified at the right lung base to suggest atelectatic change. Trace pneumothorax identified anteriorly. Upper Abdomen: Stones in the gallbladder. Small hiatal hernia. Soft Tissues/Bones: Multilevel degenerative changes seen within the spine. No acute chest wall abnormality. Indwelling chest tube identified on the left with small left pleural effusion and trace anterior pneumothorax. Consolidation seen in airspace disease throughout the left upper and lower lobes to suggest a multifocal superimposed pneumonia. Soft tissue density seen in the hilar region to suggest possible reactive adenopathy. Moderate-sized pericardial effusion. Minimal atelectatic changes seen at the right lung base. Incidental stones in the gallbladder with small hiatal hernia. XR CHEST   2/19/2023   1.  Minimal partial clearing of the left lung. The previously noted left pleural effusion is smaller 2. Stable position of the left chest tube 3. The right lung is clear. XR CHEST  2023 There is opacification of the left hemithorax. No evidence of pneumothorax. Air bronchograms are noted in the perihilar region. Since the prior study there appears to been placement of a left-sided chest tube with its tip near the left upper chest apex. Right-sided dual lead pacemaker is unchanged. Heart appears enlarged. Mild reticular opacities in the right lung are unchanged. Stable degenerative changes in the left shoulder and AC joint. 1.  Apparent interval placement of left chest tube catheter. No pneumothorax. 2.  Persistent opacification of the left hemithorax. XR CHEST  2023   1. Near complete whiteout of the left hemithorax likely represent a combination of partial collapse of the left lung and large pleural effusion 2. Cardiomegaly 3. The right lung is grossly clear. US DUP UPPER EXTREMITY RIGHT VENOUS  2023  There is normal flow and compressibility of the visualized venous structures. There is no evidence of echogenic thrombus. The veins demonstrate good compressibility with normal color flow study and spectral analysis. No evidence of DVT. FLUORO FOR SURGICAL PROCEDURES  2023  12 spot images of the lumbar spine were obtained. Intraprocedural fluoroscopic spot images as above. See separate procedure report for more information. EGD 2023  [id number] [place] [address] Patient: Amita Doyle MRN: [id number] : 1935 Account: Gender: Male Age: 82 Years Procedure: Upper GI endoscopy Date: 2023 Attending Physician: Gene Harrell Indications:        -  Anemia        -  Melena Medications:        -  Monitored Anesthesia Care Complications:        -  No immediate complications. Estimated Blood Loss:        -  Estimated blood loss: none.  Procedure:        - The Gastroscope was introduced through the mouth and advanced to the second           part of the duodenum.        -  The patient tolerated the procedure well. Findings:        -  A 2 cm hernia was found.        -  Esophagogastric landmarks were identified: the gastroesophageal junction           was found at 36 cm, the lower esophageal sphincter was found at 38 cm and the           upper extent of the gastric folds was found at 36 cm from the incisors. -  Patchy moderate inflammation characterized by erythema was found in the           gastric antrum and in the gastric body. -  The examined duodenum was normal.        -  The cardia and gastric fundus were normal on retroflexion.        - No old or fresh blood noted Impression:        -  2 cm hernia. -  Esophagogastric landmarks identified.        -  Gastritis. -  Normal examined duodenum.        -  No specimens collected. - No old or fresh blood noted Recommendation:        -  Put patient on a clear liquid diet starting today. -  Continue present medications. - Further recommendations per inpatient team Procedure Code(s):        - 38912, Esophagogastroduodenoscopy, flexible, transoral; diagnostic,           including collection of specimen(s) by brushing or washing, when performed           (separate procedure) Diagnosis Code(s):        - D64.9, Anemia, unspecified        - K92.1, Melena (includes Hematochezia)        - K44.9, Diaphragmatic hernia without obstruction or gangrene        - K29.70, Gastritis, unspecified, without bleeding       CPT(R) - 2022 copyright [employer]. All Rights Reserved. The CPT codes, CCI edits and ICD codes generated are intended as suggestions       and were generated based on input data. These codes are preliminary and upon        review may be revised to meet current compliance and payer requirements.        The provider is responsible for the final determination of appropriate codes,       and modifiers. Scope Withdrawal Time:       00:07:45 Chaparro Crawford MD This document has been electronically signed. Previous extensive, complex labs, notes and diagnostics reviewed and analyzed. ALLERGIES:    Allergies as of 02/25/2023 - Fully Reviewed 02/25/2023   Allergen Reaction Noted    Benadryl [diphenhydramine hcl] Anxiety 01/31/2012    Diphenhydramine Anxiety 05/09/2018      (please also verify by checking MAR)     Today I evaluated this patient for periodic reassessment of medical and functional status. The patient was discussed in detail at the treatment team meeting focusing on current medical issues, progress in therapies, social issues, psychological issues, barriers to progress and strategies to address these barriers, and discharge planning. See the addendum to rehab progress note-as a second progress note in the chart. The patient continues to be high risk for future disability and their medical and rehabilitation prognosis continue to be good and therefore, we will continue the patient's rehabilitation course as planned. The patient's tentative discharge date was set. Patient and family education was discussed. The patient was made aware of the team discussion regarding their progress. Complex Physical Medicine & Rehab Issues Assess & Plan:   Severe abnormality of gait and mobility and impaired self-care and ADL's secondary to progressive cardiopulmonary debility. Functional and medical status reassessed regarding patients ability to participate in therapies and patient found to be able to participate in acute intensive comprehensive inpatient rehabilitation program including PT/OT to improve balance, ambulation, ADLs, and to improve the P/AROM. Therapeutic modifications regarding activities in therapies, place, amount of time per day and intensity of therapy made daily. In bed therapies or bedside therapies prn.    Bowel and Bladder dysfunction  , Neurogenic bowel and bladder: frequent toileting, ambulate to bathroom with assistance, check post void residuals. Check for C.difficile x1 if >2 loose stools in 24 hours, continue bowel & bladder program.  Monitor bowel and bladder function. Lactinex 2 PO every AC. MOM prn, Brown Bomb prn, Glycerin suppository prn, enema prn. Encourage therapy and nursing to co-treat and problem solve re continence. Severe back pain, lumbar, as well as generalized OA pain: reassess pain every shift and prior to and after each therapy session, give prn Tylenol and consider scheduled Tylenol, modalities prn in therapy, masage, Lidoderm, K-pad prn. Consider scheduled AM pain meds. Skin healing  ears and breakdown risk:  continue pressure relief program.  Daily skin exams and reports from nursing. Fatigue due to nutritional and hydration deficiency: Add and titrate vitamin B12 vitamin D and CoQ10 continue to monitor I&Os, calorie counts prn, dietary consult prn. Add healthy snack at night. Acute episodic insomnia with situational adjustment disorder:  prn Ambien, monitor for day time sedation. Falls risk elevated:  patient to use call light to get nursing assistance to get up, bed and chair alarm. Elevated DVT risk: progressive activities in PT, continue prophylaxis JOB hose, elevation and meds-see MAR. Complex discharge planning:  Weekly team meeting every Monday to re-assess progress towards goals, discuss and address social, psychological and medical comorbidities and to address difficulties they may be having progressing in therapy. Patient and family education is in progress. The patient is to follow-up with their family physician after discharge.         Complex Active General Medical Issues that complicate care Assess & Plan:    Mesothelioma-titrate O2, aerosols, follow-up with Dr. Millie Jacobo, drain pleural Dex daily versus 3 times a week per protocols from pulmonology and lung surgery consult  Mixed hyperlipidemia,  Atherosclerosis of native artery of both lower extremities with intermittent claudication, Atrial fibrillation, Venous insufficiency-Acute rehab to monitor heart rate and rhythm with the option of telemetry and the effects of chronotropic medication with respect to increasing physical activity and exercise in PT, OT, ADLs with medication titration to lowest effective dosing. Continue blood signs every shift focusing on heart rate, rhythm and blood pressure checks with orthostatic checks-monitoring the effect of exercise, therapy and posture. Consult hospitalist for backup medical and adjust/add medications (aspirin, Lasix, Lopressor, Crestor, Coumadin, bridging Lovenox). Monitor heart rate and blood pressure as well as medications effects on vital signs before during and after therapy with especial focus on preventing orthostasis and falls risk. Gastritis without bleeding-Elevate head of bed after meals, monitor stools for blood, lowest effective dose of PPI, consider Tums. Pneumonia of both lungs due to infectious organism-Merrem Acute rehab for endurance traing with Pulse Ox to monitoring oxygen saturation and heart rate with O2 titration to lowest effective dose. Pulse oximeter checks to shift and at HS to dose and titrate oxygen and aerosol treatments monitor for nocturnal hypoxemia, monitor vital signs, oxygen prn. Focus on energy conservation. eft a Pleurx tube in O2   drain  GERD-Elevate head of bed after meals, monitor stools for blood, lowest effective dose of PPI, consider Tums. Anticoagulation coumadinization for R-hsd-hoxtjig pharmacist regarding INR management    Acute kidney failure-Eliminate toxic medications, monitor I's and O's focusing on urine output, recheck BMP.     Spinal stenosis of lumbar region with neurogenic claudication    Balance disorder-focus on balance and therapy       Focus of today's plan-  Initiate and modify therapuetic plan to meet patients individual needs, add rest breaks as needed, and Focus on endurance, activity pacing, reassessing rehab goals and discharge planning.       Electronically signed by Vasile Goss DO on 2/27/23 at 8:24 AM WARREN Toscano D.O., PM&R     Attending    [address]

## 2023-02-27 NOTE — PROGRESS NOTES
Physical Therapy  Facility/Department: [facility] MED SURG [id number]/[id number]  Physical Therapy Discharge      NAME: Rossy Morelos    : 1935 (82 y.o.)  MRN: [id number]    Account: [de-identified]  Gender: male      Patient has been discharged from acute care hospital. DC patient from current PT program.      Electronically signed by Ihsan Le PT on 23 at 4:44 PM EST

## 2023-02-27 NOTE — PROGRESS NOTES
INDIVIDUALIZED OVERALL REHAB PLAN OF CARE  ADDENDUM TO REHAB PROGRESS NOTE-for audit purposes must also refer to this day's clinical note and combine the information      Date: 2023  Patient Name: Dyllan Fenton   Room: M938/S915-19    MRN: [id number]    : 1935  (82 y.o.)  Gender: male       Today 2023 during weekly team meeting, I reviewed the patient Dyllan Fenton in detail with the therapists and nurses involved in patient's care gathering complex physiatric data regarding current medical issues, progress in therapies, factors limiting progress, social issues, psychological issues, ongoing therapeutic plans and discharge planning. Legend:  I= independent Im =Modified independent  S=Supervised SB=stand by DIAZ=set up CG=contact jodie Min= minimal Mod=Moderate Max=maximal Max of 2 =maximal assist of 2 people      CURRENT FUNCTIONAL STATUS:        NURSING ISSUES:    Pt continues to be on 4 liters 02 n/c. Sob with exertion. Plurex catheter intact under dressing to left lower side of chest. to be drained Monday , Wed and Friday as ordered. Pt complains of pain to left shoulder and was medicated at hs with tylenol 650 mg and again at  0258 with Roxicodone 5 mg po for 6/10 pain. Pt's IV in right hand was painful when flushed and leaking. IV removed and new IV stared in right forearm with # 22 iv cath and Merrem 1000 mg IVPB was delayed in getting started so next dose will need to be rescheduled. Pt uses CPAP at hs with 02 on at 4 liters. Pt complains of pain to area around left ear where oxygen tubing lays. Area is reddened. Will contact RT for gray foam to cover tubing for comfort. Pt is cooperative and pleasant. Walks to bathroom with walker to void. Last Bm was 23. Telemetry shows pacing at rate of 76. Gray foam coverings applied to 02 tubing behind both ears. Pt resting quietly.       Nursing will continue to focus on bowel and bladder continence transitioning toward independence by time of discharge. Monitoring post void residuals monitoring for severe constipation and bowel obstruction. Barriers to progress and discharge severe pain      Bowel function- constipation  Plans to address- scheduled voids     Bladder function-  continent    Plans to address- schedule voids before bed and therapy     Skin deficits-  icthyosis   Plans to address- topicals and dressing changes     Hydration/Nutritional deficits- monitoring for dysphagia  Plans to address-Push PO, assist with feeds as needed      BP- decline in BP intake is a concern  Plans to address- check ortho BPs before therapies-and use abdominal binder and TEDs as needed    Pt and Family training goals-  teach home technology options like Risa use and home assistive devices      Focus on achieving ADL goals with co-treating with OT when possible. Focus on cognition and co treat with SLP when possible. PHYSICAL THERAPY  Bed mobility:  Bed mobility  Rolling to Left: Minimal assistance (02/26/23 1242)  Rolling to Right: Minimal assistance (02/26/23 1242)  Supine to Sit: Moderate assistance (02/26/23 1242)  Sit to Supine: Moderate assistance (02/26/23 1242)  Bed Mobility Comments: HOB flat; cues to avoid breath holding (02/26/23 1242)  Transfers:  Transfers  Sit to Stand: Moderate Assistance (02/26/23 1243)  Stand to Sit: Moderate Assistance (02/26/23 1243)  Comment: poor use of L LE during sit to stand; [address] used (02/26/23 1243)  Gait:   Ambulation  Surface: Level tile (02/26/23 1244)  Device: Rolling Walker (02/26/23 1244)  Other Apparatus: O2 (02/26/23 1244)  Assistance: Contact guard assistance (02/26/23 1244)  Quality of Gait: forward flexed trunk, heavy UE use and support, SOB , maintains unsafe distance inside AD (02/26/23 1244)  Gait Deviations: Slow Lety; Increased MENA; Decreased step length;Decreased step height (02/26/23 1244)  Distance: 35ft (02/26/23 1244)  Stairs:  Stairs/Curb  Stairs?: No (02/26/23 1244)  W/C mobility: Pt and Family training goals-  teach patient/family best use of assistive device        OCCUPATIONAL THERAPY     ADL  Feeding: Stand by assistance (23 120)  Grooming: Minimal assistance (23 120)  UE Bathing: Minimal assistance (23 120)  LE Bathing: Maximum assistance (23)  LE Bathing Skilled Clinical Factors: periare only per pt request (23)  UE Dressing: Moderate assistance (23 120)  LE Dressing: Maximum assistance (23 120)  Toileting: Maximum assistance (23)  Additional Comments: sponge bath ADL completed sitting EOB and patially on toilet. (23)  Toilet Transfers  Toilet - Technique: Ambulating (23)  Equipment Used: Grab bars (23)  Toilet Transfer: Moderate assistance (23)  Toilet Transfers Comments: CGA on ModA off (23)    Plans for addressing ADL deficits affecting: Focus on sequencing and energy conservation. SPEECH THERAPY/SLP     Comprehension: Within Functional Limits  Verbal Expression: Exceptions to functional limits    Plans for cognitive and communication issues affecting addressing:   Pt and Family training goals-  teach home tecnology options like Risa use and home assistive devices       Diet/Swallow:             Compensatory Swallowing Strategies :  Alternate solids and liquids, Eat/Feed slowly, Upright as possible for all oral intake, Small bites/sips             COGNITION  OT:   @FLOWTIME(304  SP:        Social History     Socioeconomic History    Marital status:      Spouse name: Vandana Augustine    Number of children: Not on file    Years of education: Not on file    Highest education level: Not on file   Occupational History    Not on file   Tobacco Use    Smoking status: Former     Packs/day: 0.50     Years: 10.00     Pack years: 5.00     Types: Cigarettes     Quit date: 1968     Years since quittin.9    Smokeless tobacco: Never   Vaping Use Vaping Use: Never used   Substance and Sexual Activity    Alcohol use: Yes     Comment: social    Drug use: Never    Sexual activity: Not on file   Other Topics Concern    Not on file   Social History Narrative    Lives With: Spouse Ashleigh    Type of Home: House in [facility] [address]: One level (basement)    Home Access: Stairs to enter without rails (hoolds on to door jamb)    Entrance Stairs - Number of Steps: 2    Bathroom Shower/Tub: Walk-in shower    Home Equipment: Felisa Susan, rolling, Rollator, Cane (uses rollator outside)    Has the patient had two or more falls in the past year or any fall with injury in the past year?: Yes (broken rib and puncture lung)    ADL Assistance: Independent    Homemaking Assistance: Needs assistance (staniding tolerance is limited)    Ambulation Assistance: Independent (ww)    Transfer Assistance: Independent    Active : No    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[employer]    Additional Comments: right handed         Social Determinants of Health     Financial Resource Strain: Not on file   Food Insecurity: Not on file   Transportation Needs: Not on file   Physical Activity: Not on file   Stress: Not on file   Social Connections: Not on file   Intimate Partner Violence: Not on file   Housing Stability: Not on file           THERAPY, [address]:    [x]  Pain medication before therapies     [x]  Check orthostatic BP and monitor heart rate and medications effects with therapy      [x]  Ambulate to the bathroom in room    [x]  Add scheduled rest beaks     [x]  In room therapies as needed      Discharge date set for:              3/12/23      Home with:   wife  with help from   wife            And:      Home Health Care:     [x]  PT    [x]  OT    []  ST   [x]  Aide       [x]  RN                    Equipment:  [employer],        At D/C their function is goaled at:   PT:Long Term Goal 1: indep bed mobility  Long Term Goal 2: Pt will demonstrate transfers supervision with safest AD  Long Term Goal 3: Pt will demonstrate amb >/= 150ft supervision with safest AD  Long Term Goal 4: Pt will demonstrate stair negotiation 3 steps without rails with safest AD SBA  Long Term Goal 5: Pt will demonstrate pantoja balance assessment >/= 45/56 for decreased risk for falls. OT:Eating  Assistance Needed: Supervision or touching assistance  CARE Score: 4  Discharge Goal: Independent, Oral Hygiene  Assistance Needed: Supervision or touching assistance  CARE Score: 4  Discharge Goal: Independent, Toileting Hygiene  Assistance needed: Substantial/maximal assistance  CARE Score: 2  Discharge Goal: Supervision or touching assistance, Shower/Bathe Self  Assistance Needed: Substantial/maximal assistance  CARE Score: 2  Discharge Goal: Supervision or touching assistance  Upper Body Dressing  Assistance Needed: Partial/moderate assistance  CARE Score: 3  Discharge Goal: Supervision or touching assistance, Lower Body Dressing  Assistance Needed: Substantial/maximal assistance  CARE Score: 2  Discharge Goal: Supervision or touching assistance, Putting On/Taking Off Footwear  Assistance Needed: Dependent  CARE Score: 1  Discharge Goal: Supervision or touching assistance, Toilet Transfer  Assistance needed: Partial/moderate assistance  CARE Score: 3  Discharge Goal: Supervision or touching assistance  SP:Long Term Goals  Time Frame for Long Term Goals: 2 weeks or LOS until goals are met. Goal 1: Pt will demonstrate functional cognitive-linguistic abilities in all opportunities with modified independence in order to safely complete ADLs.   Long-term Goals  Timeframe for Long-term Goals: n/a           From a cognitive standpoint they will need:        24 hr vs daily transitioning to supervision  -->progress to occasional             Significant problems/ barriers to functional progress include: Pt is at a high risk for functional loss,      []  Acute infection/UTI    []  Low BP's     []  COPD flare-up   []  Uncontrolled blood sugar     []  Progressive anemia     [x]  poor endurance    []  Severe pain     []  Impaired mental status    []  Urinary incontinence    []  Bowel incontinence           Plan to correct barriers to functional progress: Add scheduled rest breaks, CoQ 10 and Vit B 12, control pain by using ice Lidoderm rest and massage as well as pain medications prior to therapy. Spread therapy of 15 hours out over a 7 day window to accommodate rest breaks and medical interventions. Patient seems to be making fair to good response to these interventions. Based on a comprehensive evaluation of the above, the individualized therapy and Discharge plan will be:    -Times stated are an average that will be varied based on the patient's daily need. PT   1 1/2  hrs/day 5-7 days per wk      OT   1 1/2 hrs per day 5-7 days per wk     ST  1/2    hrs /day 3-5 days per week       Estimated LOS   1-2 week(s)    -Overall functional prognosis:     [x]  Good    []  Fair    []  Poor     -Medical Prognosis:   [x]  Good    []  Fair    []  Poor    This patient was made aware of the discussion of Plan of Care, their projected dicharge date and their projected function at discharge.          Khadar Bello, DO

## 2023-02-27 NOTE — PROGRESS NOTES
Gray foam coverings applied to 02 tubing behind both ears. Pt resting quietly. Electronically signed by Emil Ames.  Jalyn Red on 2/27/2023 at 3:50 AM

## 2023-02-27 NOTE — PROGRESS NOTES
OCCUPATIONAL THERAPY  INPATIENT REHAB TREATMENT NOTE  [address]      NAME: Luis Stubbs  : 1935 (82 y.o.)  MRN: [id number]  CODE STATUS: Full Code  Room: [facility]/U192-91    Date of Service: 2023    Referring Physician: Dr. Nancy Jorgensen  Rehab Diagnosis: Impaired mobility and ADL's due to severe pulmonary debility    Restrictions  Restrictions/Precautions  Restrictions/Precautions: Fall Risk         Position Activity Restriction  Other position/activity restrictions: pleurx on L side    Patient's date of birth confirmed: Yes    SAFETY:  Safety Devices  Safety Devices in place: Yes  Type of devices: All fall risk precautions in place    SUBJECTIVE:  Subjective: \" No just my shoulder. \"    Pain at start of treatment: Yes: 5/10    Pain at end of treatment: Yes: 5/10    Location: L shoulder  Nursing notified: No  Intervention: None    COGNITION:  Orientation  Overall Orientation Status: Within Functional Limits  Orientation Level: Oriented X4  Cognition  Overall Cognitive Status: WFL      Pt's current cognitive status is:  Comprehension: Mod I  Expression: Mod I  Social Interaction: Mod I  Problem Solving: Min A  Memory: Supervision    OBJECTIVE:    Grooming/Oral Hygiene  Assistance Level: Modified independent  Skilled Clinical Factors: seated at sink with self s/u and clean up for denture care and grooming hair  Upper Extremity Bathing  Assistance Level: Moderate assistance  Skilled Clinical Factors: RUE arm pit and LUE assist  Lower Extremity Bathing  Assistance Level:  Moderate assistance  Skilled Clinical Factors: BLE lower legs/feet and buttocks in standing washed/dried  Upper Extremity Dressing  Equipment Provided: Dressing stick  Assistance Level: Minimal assistance  Skilled Clinical Factors: Sup to doff shirt but Min A to don shirt d/t LUE shoulder not able move within functional range and pt required assist to don shirt overhead to avoid catching arm and raising it higher than it can go  Lower Extremity Dressing  Assistance Level: Maximum assistance  Skilled Clinical Factors: SBA to doff underwear/pants using clothing stick with training; Max A to don underwear (TD over feet, max A to waist, and pants: Max A over feet and Min A to waist)  Putting On/Taking Off Footwear  Equipment Provided: Dressing stick  Assistance Level: Maximum assistance  Skilled Clinical Factors: Sup with training of dressing stick to doff socks and TD (A) to don socks  Toileting  Assistance Level: Moderate assistance  Skilled Clinical Factors: Mod A for waist < >knee pant/underwear management and TD (A) for posterior car to cleanse d/t having minimal loose stool  Toilet Transfers  Technique: Stand step  Equipment: Standard toilet  Assistance Level: Stand by assist  Skilled Clinical Factors: use of grab bars, standard toilet  Tub/Shower Transfers  Type: Shower  Transfer From: Rolling walker  Transfer To:  Shower chair with back  Assistance Level: Contact guard assist         Functional Mobility  Device: Rolling walker  Activity: To/From bathroom  Assistance Level: Stand by assist  Skilled Clinical Factors: used walker to bathroom and w/c out of bathroom d/t shortness of breath  Sit to Stand  Assistance Level: Stand by assist  Stand to Sit  Assistance Level: Stand by assist       Education:   AE training with clothing stick, pursed lip breathing techniques, how to don 02, sequencing of donning/doffing 02 per UB clothing management  Educated pt wife and daughter for future reference therapy is asking family to stay in room when therapies are being completed in therapy gym unless family training is being provided    Equipment recommendations:  OT Equipment Recommendations  Other: continue to assess      ASSESSMENT:  Assessment: Pt LUE shoulder has limited range and pain, completed ADL tasks well but needed intermittent recovery periods d/t shortness of breath, required vc's for pursed lip breathing, training on how to don 02 into nose  Activity Tolerance: Patient limited by fatigue;Patient limited by endurance      PLAN OF CARE:  Balance training, Functional mobility training, Strengthening, ROM, Endurance training, Safety education & training, Patient/Caregiver education & training, Equipment evaluation, education, & procurement, Home management training, Self-Care / ADL, Coordination training  Continue OT POC until discharge    Patient goals : to get stronger and possibly return to driving  Time Frame for Long Term Goals : within 1.5-2wks pt will demosntrate progress in the following areas to achieve specific LTG's listed in the initial eval  Long Term Goal 1: improve overall strength/endurance for functional tasks. Long Term Goal 2: improve independence with self care  Long Term Goal 3: improve sitting/standing balance for ADL tasks  Long Term Goal 4: improve functional mobility with LRD for safe item transport/retrieval  Long Term Goal 5: improve FMC to independently manipulate fasteners        Therapy Time:   Individual Group Co-Treat   Time In 1030       Time Out 1147         Minutes 77               ADL/IADL trainin minutes   Other (specify): 17 extra minutes d/t pt needing to use the bathroom and still needed to complete oral care for proper documentation per pt ADL skills. Electronically signed by:     HERMAN Webster,   2023, 12:13 PM

## 2023-02-27 NOTE — PLAN OF CARE
Problem: Discharge Planning  Goal: Discharge to home or other facility with appropriate resources  Outcome: Progressing  Flowsheets (Taken 2/27/2023 1340)  Discharge to home or other facility with appropriate resources: Identify barriers to discharge with patient and caregiver     Problem: Pain  Goal: Verbalizes/displays adequate comfort level or baseline comfort level  Outcome: Progressing     Problem: Safety - Adult  Goal: Free from fall injury  Outcome: Progressing     Problem: ABCDS Injury Assessment  Goal: Absence of physical injury  Outcome: Progressing     Problem: Chronic Conditions and Co-morbidities  Goal: Patient's chronic conditions and co-morbidity symptoms are monitored and maintained or improved  Outcome: Progressing  Flowsheets (Taken 2/27/2023 1340)  Care Plan - Patient's Chronic Conditions and Co-Morbidity Symptoms are Monitored and Maintained or Improved: Monitor and assess patient's chronic conditions and comorbid symptoms for stability, deterioration, or improvement

## 2023-02-27 NOTE — PROGRESS NOTES
Nutrition Assessment     Type and Reason for Visit: Initial, Consult    Nutrition Recommendations/Plan:   Suggest Carb 4 diet restriction for weight control     Malnutrition Assessment:  Malnutrition Status: No malnutrition    Nutrition Assessment:  Pt is stable from a nutritional standpoint with verbalized good appetit, noted good intake at meals, with no nutritional complaints. Estimated Daily Nutrient Needs:  Energy (kcal):  3827-2101 (kg x 14-15)         Nutrition Related Findings:   PMH-CVA, CHF, htn, hld, Mesothelioma; adm with post laminectomy syndrome. 2/21-egd  with gastritis, small hernia. Labs/meds reviewed. (lasix). +2 BLE edema noted. Last BM noted 2/26. Current Nutrition Therapies:    ADULT DIET; Regular;  Low Sodium (2 gm)    Anthropometric Measures:  Height: 5' 7\" (170.2 cm)  Current Body Wt:  345SA40.4QD (adm, bedscale)  BMI:  41    Nutrition Diagnosis:   Overweight/Obese related to excessive energy intake as evidenced by BMI    Nutrition Interventions:   Food and/or Nutrient Delivery:  (Suggest Carb 4 diet  for weight control)  Nutrition Education/Counseling: No recommendation at this time  Coordination of Nutrition Care: Continue to monitor while inpatient       Goals:     Goals: PO intake 50% or greater       Nutrition Monitoring and Evaluation:      Food/Nutrient Intake Outcomes: Food and Nutrient Intake  Physical Signs/Symptoms Outcomes: Weight, Biochemical Data    Elaine Hong RD, LD

## 2023-02-27 NOTE — PROGRESS NOTES
Physical Therapy Rehab Treatment Note  Facility/Department: [facility]  Room: 30/I438-33       NAME: Nancy Lam  : 1935 (82 y.o.)  MRN: [id number]  CODE STATUS: Full Code    Date of Service: 2023       Restrictions:  Restrictions/Precautions: Fall Risk  Position Activity Restriction  Other position/activity restrictions: pleurx on L side       SUBJECTIVE:   Subjective: \"I am ready\"    Pain  Pain: no pain reported pre and post session      OBJECTIVE:     Outcomes measures:   Initiated TEIXEIRA test. Patient unable to complete due to time restraints    Sit to Stand  Assistance Level: Stand by assistance  Skilled Clinical Factors: Level of assistance varies from surface to surface. Cues for hand placement intermittently  Stand to Sit  Assistance Level: Stand by assist  Skilled Clinical Factors: Cues for hand placement  Bed To/From Chair  Technique: Stand step (with ww)    Ambulation  Surface: Carpet  Device: Rolling walker  Distance: 70' x 2  Activity: Within Unit  Activity Comments: Slow jefe increased lateral excursion, decreased heel strike L>R  Additional Factors: Verbal cues  Assistance Level: Contact guard assist  Gait Deviations: Slow jefe;Decreased heel strike left  Skilled Clinical Factors: Cues for upright posture/ increasing heel strike LLE. Multiple trials to improve endurance    Activity Tolerance  Activity Tolerance: Patient tolerated treatment well    ASSESSMENT/PROGRESS TOWARDS GOALS:   Assessment  Assessment: Patient improved Gait tolerance and requires decreased assistance to complete STS transfers at this time.  Initiated TEIXEIRA balance test unable to complete due to time restraints  Activity Tolerance: Patient tolerated treatment well  Discharge Recommendations: Continue to assess pending progress    Goals:  Long Term Goals  Long Term Goal 1: indep bed mobility  Long Term Goal 2: Pt will demonstrate transfers supervision with safest AD  Long Term Goal 3: Pt will demonstrate amb >/= 150ft supervision with safest AD  Long Term Goal 4: Pt will demonstrate stair negotiation 3 steps without rails with safest AD SBA  Long Term Goal 5: Pt will demonstrate pantoja balance assessment >/= 45/56 for decreased risk for falls. Patient Goals   Patient Goals : [place] out of chairs easier. drive a car.  stand for longer time. \"    PLAN OF CARE/Safety:   Safety Devices  Type of Devices: All fall risk precautions in place; Chair alarm in place      Therapy Time:   Individual   Time In 1330   Time Out 1400   Minutes 30     Minutes: 30  Transfer/Bed mobility trainin  Gait training: 15  Neuro re education: COLE Wilkins, 23 at 2:50 PM

## 2023-02-27 NOTE — PROGRESS NOTES
[facility]  Facility/Department: [facility]  Speech Language Pathology   Treatment Note          Bhavik Dietrich  1935  [id number]  [x]   confirmed    Date: 2023      Restrictions/Precautions: Fall Risk  Position Activity Restriction  Other position/activity restrictions: pleurx on L side    Weight: 261 lb 14.4 oz (118.8 kg)     ADULT DIET; Regular; Low Sodium (2 gm)    SpO2: 97 % (23)  O2 Flow Rate (L/min): 3 L/min (23)  No active isolations    Rehab Diagnosis:      Subjective:  Alert, Cooperative, and Pleasant        Interventions used this date:  Cognitive Skill Development    Objective/Assessment:  Patient progressing towards goals:  Short Term Goals  Time Frame for Short Term Goals: 2 weeks or LOS until goals are met. Goal 1: To increase safety awareness and judgment for safe completion of ADLs secondary to pt's cognitive deficits, pt will complete abstract reasoning tasks (i.e. Word deduction, convergent and divergent naming, similarities/differences) with 80% accuracy and min cues. Patient completed a word deduction task with 45% accuracy, with min cueing and repetition 100%. Goal 2: To increase safety awareness and judgment for safe completion of ADLs secondary to pt's cognitive deficits,  pt will complete min-mod level problem solving tasks related to ADLs (e.g. medication) with 80% accuracy and min cues. Patient completed a prescription label task with 71% accuracy, with min cueing 100%. Goal 4: To decrease pt's cognitive deficits through the use of compensatory strategies, the pt will be educated on 3 different memory strategies and verbalize how he/she might use them at home in 3 ways with  min cues. Patient educated on memory strategies. Indicated he and his wife utilize calendars and address books.      Patient completed a memory and manipulation task utilizing the repetition out loud strategy with the following accuracy:  3 word repetition: 73% accuracy I, with repetition from ST and double repetition to self, 100%  3 word repetition reversal: 36% accuracy I, with repetition from ST and double repetition to self 45%    Patient utilized his schedule on his wheelchair to state where he is going after therapy. Treatment/Activity Tolerance:  Patient tolerated treatment well    Plan:  Continue per POC    Pain Assessment:  Patient c/o pain in left shoulder, HÉCTOR Lopes notified. Pain medications administered. Pain Re-assessment:  Patient does not appear in pain. Patient/Caregiver Education:  Patient educated on session and progression towards goals.     Safety Devices:  Chair alarm in place      Speech Therapy Level of Assistance Scale    AUDITORY COMPREHENSION  Rating:  Supervised Assistance    VERBAL EXPRESSION  Rating:  Modified Independent-Supervised Assistance    MOTOR SPEECH  Rating: Independent    PROBLEM SOLVING  Rating: Minimal Assistance    MEMORY  Rating:  Minimal Assistance-Moderate Assistance        Therapy Time  SLP Individual Minutes  Time In: 1000  Time Out: 4782  Minutes: 30            Signature: GEOFF Agustin

## 2023-02-28 LAB
INR BLD: 2.8
PROTHROMBIN TIME: 30 SEC (ref 12.3–14.9)

## 2023-02-28 PROCEDURE — 97129 THER IVNTJ 1ST 15 MIN: CPT

## 2023-02-28 PROCEDURE — 2580000003 HC RX 258: Performed by: FAMILY MEDICINE

## 2023-02-28 PROCEDURE — 36415 COLL VENOUS BLD VENIPUNCTURE: CPT

## 2023-02-28 PROCEDURE — 97530 THERAPEUTIC ACTIVITIES: CPT

## 2023-02-28 PROCEDURE — 2700000000 HC OXYGEN THERAPY PER DAY

## 2023-02-28 PROCEDURE — 97535 SELF CARE MNGMENT TRAINING: CPT

## 2023-02-28 PROCEDURE — 1180000000 HC REHAB R&B

## 2023-02-28 PROCEDURE — 6370000000 HC RX 637 (ALT 250 FOR IP): Performed by: NURSE PRACTITIONER

## 2023-02-28 PROCEDURE — 85610 PROTHROMBIN TIME: CPT

## 2023-02-28 PROCEDURE — 97116 GAIT TRAINING THERAPY: CPT

## 2023-02-28 PROCEDURE — 97112 NEUROMUSCULAR REEDUCATION: CPT

## 2023-02-28 PROCEDURE — 99222 1ST HOSP IP/OBS MODERATE 55: CPT | Performed by: INTERNAL MEDICINE

## 2023-02-28 PROCEDURE — 6370000000 HC RX 637 (ALT 250 FOR IP): Performed by: PHYSICAL MEDICINE & REHABILITATION

## 2023-02-28 PROCEDURE — 97130 THER IVNTJ EA ADDL 15 MIN: CPT

## 2023-02-28 PROCEDURE — 6370000000 HC RX 637 (ALT 250 FOR IP): Performed by: FAMILY MEDICINE

## 2023-02-28 PROCEDURE — 99232 SBSQ HOSP IP/OBS MODERATE 35: CPT | Performed by: PHYSICAL MEDICINE & REHABILITATION

## 2023-02-28 PROCEDURE — 6360000002 HC RX W HCPCS: Performed by: FAMILY MEDICINE

## 2023-02-28 RX ORDER — WARFARIN SODIUM 2 MG/1
4 TABLET ORAL
Status: COMPLETED | OUTPATIENT
Start: 2023-02-28 | End: 2023-02-28

## 2023-02-28 RX ADMIN — OXYCODONE 5 MG: 5 TABLET ORAL at 08:56

## 2023-02-28 RX ADMIN — METOPROLOL TARTRATE 25 MG: 25 TABLET, FILM COATED ORAL at 08:55

## 2023-02-28 RX ADMIN — MEROPENEM 1000 MG: 1 INJECTION, POWDER, FOR SOLUTION INTRAVENOUS at 19:11

## 2023-02-28 RX ADMIN — FUROSEMIDE 20 MG: 20 TABLET ORAL at 08:55

## 2023-02-28 RX ADMIN — GUAIFENESIN 600 MG: 600 TABLET ORAL at 20:10

## 2023-02-28 RX ADMIN — GUAIFENESIN 600 MG: 600 TABLET ORAL at 08:55

## 2023-02-28 RX ADMIN — Medication 100 MG: at 08:54

## 2023-02-28 RX ADMIN — NALOXEGOL OXALATE 25 MG: 12.5 TABLET, FILM COATED ORAL at 08:55

## 2023-02-28 RX ADMIN — Medication 5 MG: at 20:10

## 2023-02-28 RX ADMIN — ASPIRIN 81 MG: 81 TABLET, COATED ORAL at 08:55

## 2023-02-28 RX ADMIN — Medication 2000 UNITS: at 17:21

## 2023-02-28 RX ADMIN — MEROPENEM 1000 MG: 1 INJECTION, POWDER, FOR SOLUTION INTRAVENOUS at 04:35

## 2023-02-28 RX ADMIN — WARFARIN SODIUM 4 MG: 2 TABLET ORAL at 17:21

## 2023-02-28 RX ADMIN — OXYCODONE 5 MG: 5 TABLET ORAL at 20:10

## 2023-02-28 RX ADMIN — MEROPENEM 1000 MG: 1 INJECTION, POWDER, FOR SOLUTION INTRAVENOUS at 12:09

## 2023-02-28 RX ADMIN — ROSUVASTATIN CALCIUM 10 MG: 5 TABLET, FILM COATED ORAL at 08:55

## 2023-02-28 RX ADMIN — METOPROLOL TARTRATE 25 MG: 25 TABLET, FILM COATED ORAL at 20:10

## 2023-02-28 ASSESSMENT — PAIN SCALES - GENERAL
PAINLEVEL_OUTOF10: 4
PAINLEVEL_OUTOF10: 5
PAINLEVEL_OUTOF10: 0

## 2023-02-28 ASSESSMENT — PAIN DESCRIPTION - LOCATION
LOCATION: GENERALIZED
LOCATION: GENERALIZED

## 2023-02-28 ASSESSMENT — PAIN DESCRIPTION - DESCRIPTORS
DESCRIPTORS: ACHING
DESCRIPTORS: ACHING

## 2023-02-28 NOTE — PROGRESS NOTES
[facility]  Facility/Department: [facility]ara Jonas  Speech Language Pathology   Treatment Note          Ritesh Pérez  1935  [id number]  [x]   confirmed    Date: 2023      Restrictions/Precautions: Fall Risk  Position Activity Restriction  Other position/activity restrictions: pleurx on L side    Weight: 261 lb 14.4 oz (118.8 kg)     ADULT DIET; Regular; Low Sodium (2 gm)    SpO2: 100 % (23)  O2 Flow Rate (L/min): 4 L/min (23)  No active isolations    Rehab Diagnosis:      Subjective:  Alert, Cooperative, and Pleasant        Interventions used this date:  Cognitive Skill Development    Objective/Assessment:  Patient progressing towards goals:  Short Term Goals  Time Frame for Short Term Goals: 2 weeks or LOS until goals are met. Goal 1: To increase safety awareness and judgment for safe completion of ADLs secondary to pt's cognitive deficits, pt will complete abstract reasoning tasks (i.e. Word deduction, convergent and divergent naming, similarities/differences) with 80% accuracy and min cues. Patient named 5 items in an abstract category with 56% accuracy I, with min cueing 100% accuracy. ST noted increased thought processing time with this task. Goal 2: To increase safety awareness and judgment for safe completion of ADLs secondary to pt's cognitive deficits,  pt will complete min-mod level problem solving tasks related to ADLs (e.g. medication) with 80% accuracy and min cues. Patient answered true/false inference questions based on a short passage with 100% accuracy I.    Goal 3: To increase safety awareness and judgment for safe completion of ADLs secondary to pt's cognitive deficits, pt will provide reasonable solutions to problems of everyday living with 80% accuracy and min cues. Patient completed a missing equipment problem solving task with 43% accuracy I, with min cueing 86% accuracy. Patient benefit from verbal and gestural cueing from [address]. Goal 4:  To decrease pt's cognitive deficits through the use of compensatory strategies, the pt will be educated on 3 different memory strategies and verbalize how he/she might use them at home in 3 ways with  min cues. Patient educated on story telling strategy. Patient utilized story telling strategy and repetition to self strategy to complete a 10 minute delayed recall task with 100% accuracy I. Short-term Goals  Timeframe for Short-term Goals: n/a      Treatment/Activity Tolerance:  Patient tolerated treatment well    Plan:  Continue per POC    Pain Assessment:  Patient does not c/o pain. Pain Re-assessment:  Patient does not c/o pain. Patient/Caregiver Education:  Patient educated on session and progression towards goals.     Safety Devices:  Chair alarm in place      Speech Therapy Level of Assistance Scale    AUDITORY COMPREHENSION  Rating:  Supervised Assistance    VERBAL EXPRESSION  Rating:  Supervised Assistance    MOTOR SPEECH  Rating: Independent    PROBLEM SOLVING  Rating: Supervised Assistance-Minimal Assistance    MEMORY  Rating:  Minimal Assistance        Therapy Time  SLP Individual Minutes  Time In: 1000  Time Out: 1030  Minutes: 30            Signature: GEOFF Agustin

## 2023-02-28 NOTE — PROGRESS NOTES
Physical Therapy Rehab Treatment Note  Facility/Department: [facility]  [facility]       NAME: Laron Pollack  : 1935 (82 y.o.)  MRN: [id number]  CODE STATUS: Full Code    Date of Service: 2023     Restrictions:  Restrictions/Precautions: Fall Risk  Position Activity Restriction  Other position/activity restrictions: pleurx on L side    SUBJECTIVE:   Subjective: \" I slept really good\"    Pain  Pain: 5/10 generalized pain RN medicated prior to session    OBJECTIVE:     Sit to Stand  Assistance Level: Stand by assist  Skilled Clinical Factors: Multiple attempts to rise 2 sets of 5 transfers from EOM to improve technique and LE strength  Stand to Sit  Assistance Level: Supervision  Skilled Clinical Factors: Improved hand placement eccentric control  Bed To/From Chair  Technique: Stand step  Assistance Level: Stand by assist    Ambulation  Surface: Carpet  Device: Rolling walker  Distance: 50' x 1, 25' x 2  Activity: Within Unit  Activity Comments: Slow jefe increased lateral excursion, decreased heel strike L>R  Additional Factors: Verbal cues  Assistance Level: Stand by assist  Gait Deviations: Slow jefe;Decreased heel strike left  Skilled Clinical Factors: Trialed gait without O2 donned maintains 93-94% post gait and 95% at rest    ASSESSMENT/PROGRESS TOWARDS GOALS:   Body Structures, Functions, Activity Limitations Requiring Skilled Therapeutic Intervention: Decreased functional mobility ; Decreased safe awareness;Decreased strength;Decreased endurance;Decreased balance;Decreased posture  Assessment: Therapy session trialed without O2 donned this session maintains 93% SpO2 with all mobility. Transfers completed with SBA this session.   Therapy Prognosis: Good  Assessment  Discharge Recommendations: Continue to assess pending progress    Goals:  Long Term Goals  Long Term Goal 1: indep bed mobility  Long Term Goal 2: Pt will demonstrate transfers supervision with safest AD  Long Term Goal 3: Pt will demonstrate amb >/= 150ft supervision with safest AD  Long Term Goal 4: Pt will demonstrate stair negotiation 3 steps without rails with safest AD SBA  Long Term Goal 5: Pt will demonstrate pantoja balance assessment >/= 45/56 for decreased risk for falls. Patient Goals   Patient Goals : Cherylene Sour out of chairs easier. drive a car.  stand for longer time. \"    PLAN OF CARE/Safety:   Safety Devices  Type of Devices: All fall risk precautions in place; Chair alarm in place      Therapy Time:   Individual   Time In 1330   Time Out 1400   Minutes 30     Minutes: 30  Transfer/Bed mobility training: 15   Gait trainin[address] [place], [place] [address]COLE, 23 at 4:28 PM

## 2023-02-28 NOTE — PROGRESS NOTES
Physical Therapy Rehab Treatment Note  [facility]Department[facility]  [facility]       NAME: Sasha Atkinson  : 1935 (82 y.o.)  MRN: [id number]  CODE STATUS: Full Code    Date of Service: 2023     Restrictions:  Restrictions/Precautions: Fall Risk  Position Activity Restriction  Other position/activity restrictions: pleurx on L side    SUBJECTIVE:   Subjective: \" I slept really good\"    Pain  Pain: 5/10 generalized pain RN medicated prior to session    OBJECTIVE:      Outcomes Measures:    Mclaughlin balance score: 26/56    Sit to Stand  Assistance Level: Minimal assistance;Contact guard assist;Stand by assist  Skilled Clinical Factors: Level of assistance varies from surface to surface. Cues for hand placement intermittently  Stand to Sit  Assistance Level: Supervision  Skilled Clinical Factors: Improved hand placement eccentric control  Bed To/From Chair  Technique: Stand step  Assistance Level: Stand by assist  Activity comments: STS from EOM x 10     Ambulation  Surface: Carpet  Device: Rolling walker  Distance: 50' x 3  Activity: Within Unit  Activity Comments: Slow jefe increased lateral excursion, decreased heel strike L>R  Additional Factors: Verbal cues  Assistance Level: Stand by assist  Gait Deviations: Slow jefe;Decreased heel strike left  Skilled Clinical Factors: Trialed gait on 4L, 3L and 2L 4L- 98%, 3L- 97% 2L- 97%    Stairs  Stair Height: 6''  Device: Bilateral handrails  Number of Stairs: 4  Additional Factors: Verbal cues; Non-reciprocal going down;Reciprocal going up  Assistance Level: Stand by assist  Skilled Clinical Factors: Completed with 2L 93% post stair negotiation improved 2 96% with seated rest break    ASSESSMENT/PROGRESS TOWARDS GOALS:   Body Structures, Functions, Activity Limitations Requiring Skilled Therapeutic Intervention: Decreased functional mobility ; Decreased safe awareness;Decreased strength;Decreased endurance;Decreased balance;Decreased posture  Assessment: Patient continues to require varied level of assistance throughout session with STS transfers. Patient able to maintain 97% SpO2 on 2L post gait training. RN notified  Therapy Prognosis: Good  Assessment  Discharge Recommendations: Continue to assess pending progress    Goals:  Long Term Goals  Long Term Goal 1: indep bed mobility  Long Term Goal 2: Pt will demonstrate transfers supervision with safest AD  Long Term Goal 3: Pt will demonstrate amb >/= 150ft supervision with safest AD  Long Term Goal 4: Pt will demonstrate stair negotiation 3 steps without rails with safest AD SBA  Long Term Goal 5: Pt will demonstrate pantoja balance assessment >/= 45/56 for decreased risk for falls.  Patient Goals   Patient Goals : \"get out of chairs easier.  drive a car.  stand for longer time.\"    PLAN OF CARE/Safety:   Safety Devices  Type of Devices: All fall risk precautions in place;Chair alarm in place      Therapy Time:   Individual   Time In 0900   Time Out 1000   Minutes 60     Minutes: 60  Transfer/Bed mobility trainin  Gait trainin  Neuro: 15    Alvin Shoemaker PTA, 23 at 12:08 PM

## 2023-02-28 NOTE — PROGRESS NOTES
OCCUPATIONAL THERAPY  INPATIENT REHAB TREATMENT NOTE  [address]      NAME: Patricia Redman  : 1935 (82 y.o.)  MRN: [id number]  CODE STATUS: Full Code  Room: Q963/I608-52    Date of Service: 2023    Referring Physician: Dr. Nilton Stewart  Rehab Diagnosis: Impaired mobility and ADL's due to severe pulmonary debility    Restrictions  Restrictions/Precautions  Restrictions/Precautions: Fall Risk         Position Activity Restriction  Other position/activity restrictions: pleurx on L side    Patient's date of birth confirmed: Yes    SAFETY:  Safety Devices  Safety Devices in place: Yes  Type of devices: All fall risk precautions in place    SUBJECTIVE:  Subjective: \" I did it by myself at home. \"    Pain at start of treatment: Yes:  did not rate pain  Pain at end of treatment: Yes: did not rate pain  Pain typically with movement only    Location: L arn through biceps to anterior shoulder  Nursing notified: No  Intervention: None    COGNITION:  Orientation  Overall Orientation Status: Within Functional Limits  Orientation Level: Oriented X4  Cognition  Overall Cognitive Status: WFL  Cognition Comment: follows commands consistently          OBJECTIVE:     Grooming/Oral Hygiene  Assistance Level: Minimal assistance  Skilled Clinical Factors: wash hands in standing demo'ing difficulty getting soap, water on hands and reaching for paper towels, short of breath  Lower Extremity Dressing  Assistance Level: Maximum assistance  Putting On/Taking Off Footwear  Assistance Level: Maximum assistance  Toileting  Assistance Level: Moderate assistance  Skilled Clinical Factors: Mod A for waist < >knee pant/underwear management and TD (A) for posterior car to cleanse d/t having minimal loose stool  Toilet Transfers  Technique: Stand step  Equipment: Standard toilet  Additional Factors:  With handrails  Assistance Level: Stand by assist         Functional Mobility  Device: Rolling walker  Activity: To/From bathroom  Assistance Level: Stand by assist  Sit to Stand  Assistance Level: Stand by assist  Stand to Sit  Assistance Level: Stand by assist     Pt completed completed horiz dowel tanvi tree in standing with SBA. Pt placed 8 rings on each of 4 different height rods on the R side and lower 3 on the L. Standing tolerance of 4:05 and 3:35, shortness of breath requiring RB between donning/doffing rings  Demo F- balance with unilateral support during task  Decreased ROM via LUE cannot reach above shoulder height and is in pain when reaching almost 90 degrees. Pt completed task to improve with functional standing balance/tolerance and act tolerance to improve pt independence with functional ADL tasks. Provided pt resistive clothes pin activity seated with Sup to task. Pt was seated at table top height in w/c to complete task. Pt completed the task with instruction to place resistive clothes pins onto the metal dowels in order of resistance as stated on provided list of colors and resistance levels. Pt used R hand to reach forward and slightly overhead to begin placing clothes pins on dowels, and L hand in front of him on the L side on horizontal dowel and then doffed them with the BUE in the same manner as donning them. Pt demonstrated slight difficulty with LUE during task d/t pain and ROM impairment, but was able to complete the task. Pt completed task to increase ROM, strength and functional independence and ease with ADL tasks. Patient engaged in task requiring replicating a design consisting of blocks and bolts with wing nuts. Provided diagram at midline in front of pt of what picture to complete with blocks and bolts. Minimal complexity of design was provided. Pt was able to follow diagram without verbal cues or assistance. Pt demo'd visual difficulty of diagram block colors getting 1 block wrong, but all blocks were connected correctly.    Pt demonstrated grabbing and manipulating blocks, bolts and wing nuts without difficulty. Pt completed task to improve fine/gross motor skills to continue improving functional task performance skills. Education: pursed lip breathing education, and to breathe through nose more often, 02 cord management while walking with walker          ASSESSMENT:  Assessment: pt very short of breath wtih standing tasks, fair standing tolerance of up to 4 min, needs freq vc's for pursed lip breathing d/t breathing heavy through mouth  Activity Tolerance: Patient tolerated treatment well      PLAN OF CARE:  Balance training, Functional mobility training, Strengthening, ROM, Endurance training, Safety education & training, Patient/Caregiver education & training, Equipment evaluation, education, & procurement, Home management training, Self-Care / ADL, Coordination training  Continue OT POC until discharge    Patient goals : to get stronger and possibly return to driving  Time Frame for Long Term Goals : within 1.5-2wks pt will demosntrate progress in the following areas to achieve specific LTG's listed in the initial eval  Long Term Goal 1: improve overall strength/endurance for functional tasks. Long Term Goal 2: improve independence with self care  Long Term Goal 3: improve sitting/standing balance for ADL tasks  Long Term Goal 4: improve functional mobility with LRD for safe item transport/retrieval  Long Term Goal 5: improve FMC to independently manipulate fasteners        Therapy Time:   Individual Group Co-Treat   Time In 1030       Time Out 1200         Minutes 90                   ADL/IADL trainin minutes  Neuromuscular reeducation: 30 minutes  Therapeutic activities: 30 minutes     Electronically signed by:     HERMAN Barraza,   2023, 12:58 PM

## 2023-02-28 NOTE — CARE COORDINATION
LSW met with pt and wife and discussed projected discharge date of 3/12 and goals, pt voiced that he had hoped it would be an earlier date. LSW explained that the date can change depending on how he does with reaching his goals, as well as the assistance available at home. Wife is hopeful for pt to reach an independent or supervised level. Both are in agreement with discharge date at this time. No questions or concerns at this time.  Electronically signed by ANALY Marvin, DAVID on 2/28/2023 at 12:39 PM

## 2023-02-28 NOTE — PROGRESS NOTES
Assessment completed. VSS. Denied pain. LBM 2/27. 4L of oxygen, Cpap at HS (patient wearing and it is home unit.) Reports SOB with exertion. Continues IV merrem- for pna. No stop date on [facility], but prior RN reported after speaking to ID, Cynraman Tijerina should be done this coming Wednesday. INR- 2.9. Pharmacy dosing coumadin- 2.5mg given. Pleurx catheter drained on days, 275ml drained. Cx sent per thoracic surgery's orders: preliminary result showing few neutrophils, no bacteria seen. No distress noted. Call light within reach and bed alarm activated.   Electronically signed by Malu Miranda RN on 2/28/2023 at 2:05 AM

## 2023-02-28 NOTE — PLAN OF CARE
Problem: Discharge Planning  Goal: Discharge to home or other facility with appropriate resources  2/28/2023 0316 by Rosalind Jackson RN  Outcome: Progressing     Problem: Pain  Goal: Verbalizes/displays adequate comfort level or baseline comfort level  2/28/2023 0316 by Rosalind Jackson RN  Outcome: Progressing     Problem: Safety - Adult  Goal: Free from fall injury  2/28/2023 0316 by Rosalind Jcakson RN  Outcome: Progressing     Problem: ABCDS Injury Assessment  Goal: Absence of physical injury  2/28/2023 0316 by Rosalind Jackson RN  Outcome: Progressing     Problem: Chronic Conditions and Co-morbidities  Goal: Patient's chronic conditions and co-morbidity symptoms are monitored and maintained or improved  2/28/2023 0316 by Rosalind Jackson RN  Outcome: Progressing

## 2023-02-28 NOTE — PROGRESS NOTES
Subjective: The patient complains of moderate to severe acute on chronic progressive fatigue and  PRIETO partially relieved by rest, medications, PT,  OT,   SLP and rest and exacerbated by recent illness  . Patient had initially been treated at [facility] [place] where a nerve stimulator placement was attempted but delayed due to high INR. Once the procedure was finally initiated it was aborted because of possible CSF leak. Patient became shortness of breath postprocedure but this was found to be largely unrelated to the procedure but related to a large loculated pleural effusion. Patient was transferred to [facility] for thoracotomy and VATS procedure performed by Dr. Svetlana Ruiz. As noted patient had a chest tube inserted by Dr. Svetlana Ruiz on 2/18/2023. He has been followed by thoracic surgery pulmonology oncology GI and cardiology as well as the hospitalist.     From a thoracic surgery standpoint cultures were sent from his pleural effusion which were positive and he is now on IV antibiotics. The left a Pleurx tube in O2   drain 3 times a week. The plan is for outpatient removal of the drain and if any worsening symptoms occur after the Pleurx is removed possible VATS surgery. They are trying to avoid surgery for now. Pulmonology followed him advising titrating O2 to keep sats above 90 continue home CPAP and continue draining the Pleurx daily. 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 He they noted his symptoms were improving and he has a history of following with Dr. Abelardo Somers at [facility] in [facility] regarding his mesothelioma. Discussed options regarding possible chemotherapy palliative care intermittent drainage of the Pleurx catheter and possible hospice care. Currently they are advising treating anemia with IV Venofer here.   Cardiology was consulted and obtain an echocardiogram to assess the pericardial effusion    I am concerned about patients medical complexities and barriers to advancing in rehab goals including  improved pain control. I reviewed current care and plans for further care with other rehab providers including nursing and case management. According to recent nursing note, \"VSS. Denied pain. LBM 2/27. 4L of oxygen, Cpap at HS (patient wearing and it is home unit.) Reports SOB with exertion. Continues IV merrem- for pna. No stop date on [facility], but prior RN reported after speaking to IDIsabel should be done this coming Wednesday. INR- 2.9. Pharmacy dosing coumadin- 2.5mg given. Pleurx catheter drained on days, 275ml drained. Cx sent per thoracic surgery's orders: preliminary result showing few neutrophils, no bacteria seen. No distress noted. Call light within reach and bed alarm activated. \".    ROS x10: The patient also complains of severely impaired mobility and activities of daily living. Otherwise no new problems with vision, hearing, nose, mouth, throat, dermal, cardiovascular, GI, , pulmonary, musculoskeletal, psychiatric or neurological. See also Acute Rehab PM&R H&P. Vital signs:  /60   Pulse 72   Temp 98.4 °F (36.9 °C) (Oral)   Resp 18   Ht 5' 7\" (1.702 m)   Wt 261 lb 14.4 oz (118.8 kg)   SpO2 100%   BMI 41.02 kg/m²   I/O:   PO/Intake:  fair PO intake,   Reg diet    Bowel:   continent LBM 2/27  Bladder: continent    bishop  General:  Patient is well developed,   adequately nourished, and    well kempt. HEENT:    Pupils equal, hearing intact to loud voice, external inspection of ear and nose benign. Inspection of lips, tongue and gums  thrush    Musculoskeletal: No significant change in strength or tone. All joints stable. Inspection and palpation of digits and nails show no clubbing, cyanosis or inflammatory conditions. Neuro/Psychiatric: Affect: flat but pleasant. Alert and oriented to person, place and situation with  min cues.   No significant change in deep tendon reflexes or sensation  Lungs:  Diminished, CTA-B. Respiration effort is   normal at rest.     Heart:   S1 = S2,   RRR. Abdomen:  Soft, non-tender, no enlargement of liver or spleen. Extremities:   mod lower extremity edema    Skin:   Intact to general survey,      Rehabilitation:  Physical Therapy:   Bed mobility:  Bed mobility  Rolling to Left: Minimal assistance (02/26/23 1242)  Rolling to Right: Minimal assistance (02/26/23 1242)  Supine to Sit: Moderate assistance (02/26/23 1242)  Sit to Supine: Moderate assistance (02/26/23 1242)  Bed Mobility Comments: HOB flat; cues to avoid breath holding (02/26/23 1242)  Roll Left  Assistance Level: Minimal assistance (02/27/23 0942)  Roll Right  Assistance Level: Minimal assistance (02/27/23 0942)  Sit to Supine  Assistance Level: Moderate assistance (02/27/23 0942)  Skilled Clinical Factors: Assist BLE into bed (02/27/23 0942)  Supine to Sit  Assistance Level: Moderate assistance (02/27/23 0942)  Skilled Clinical Factors: Assist trunk to seated position (02/27/23 9841)  Scooting  Assistance Level: Stand by assist (02/27/23 3211)  Transfers:  Transfers  Sit to Stand: Moderate Assistance (02/26/23 1243)  Stand to Sit: Moderate Assistance (02/26/23 1243)  Comment: poor use of L LE during sit to stand; [facility] used (02/26/23 1243)  Sit to Stand  Assistance Level: Minimal assistance;Contact guard assist;Stand by assist (02/27/23 8689)  Skilled Clinical Factors: Level of assistance varies from surface to surface.  Cues for hand placement intermittently (02/27/23 0942)  Stand to Sit  Assistance Level: Stand by assist (02/27/23 5709)  Skilled Clinical Factors: Cues for hand placement (02/27/23 0942)  Bed To/From Chair  Technique: Stand step (with ww) (02/27/23 0942)  Car Transfer  Assistance Level: Minimal assistance (02/27/23 0942)  Skilled Clinical Factors: Enrico to rise from low level chair able to lift BLE in and out of car (02/27/23 0942)  Gait:   Ambulation  Surface: Level tile (02/26/23 1244)  Device: Rolling Walker (02/26/23 1244)  Other Apparatus: O2 (02/26/23 1244)  Assistance: Contact guard assistance (02/26/23 1244)  Quality of Gait: forward flexed trunk, heavy UE use and support, SOB , maintains unsafe distance inside AD (02/26/23 1244)  Gait Deviations: Slow Jefe; Increased MENA; Decreased step length;Decreased step height (02/26/23 1244)  Distance: 35ft (02/26/23 1244)  Ambulation  Surface: Carpet (02/27/23 1446)  Device: Rolling walker (02/27/23 1446)  Distance: 70' x 2 (02/27/23 1446)  Activity: Within Unit (02/27/23 1446)  Activity Comments: Slow jefe increased lateral excursion, decreased heel strike L>R (02/27/23 1446)  Additional Factors: Verbal cues (02/27/23 1446)  Assistance Level: Contact guard assist (02/27/23 1446)  Gait Deviations: Slow jefe;Decreased heel strike left (02/27/23 1446)  Skilled Clinical Factors: Cues for upright posture/ increasing heel strike LLE (02/27/23 1153)  Stairs:  Stairs/Curb  Stairs?: No (02/26/23 1244)  Stairs  Stair Height: 6'' (02/27/23 1153)  Device: Bilateral handrails (02/27/23 1153)  Number of Stairs: 4 (02/27/23 1153)  Additional Factors: Verbal cues; Non-reciprocal going up;Non-reciprocal going down (02/27/23 1153)  Assistance Level: Contact guard assist (02/27/23 1153)  W/C mobility:       Occupational Therapy:   Hand Dominance: Right  ADL  Feeding: Stand by assistance (02/26/23 1201)  Grooming: Minimal assistance (02/26/23 1201)  UE Bathing: Minimal assistance (02/26/23 1201)  LE Bathing: Maximum assistance (02/26/23 1201)  LE Bathing Skilled Clinical Factors: periare only per pt request (02/26/23 1201)  UE Dressing: Moderate assistance (02/26/23 1201)  LE Dressing: Maximum assistance (02/26/23 1201)  Toileting: Maximum assistance (02/26/23 1201)  Additional Comments: sponge bath ADL completed sitting EOB and patially on toilet.  (02/26/23 1201)  Toilet Transfers  Toilet - Technique: Ambulating (02/26/23 1207)  Equipment Used: Grab bars (02/26/23 1207)  Toilet Transfer: Moderate assistance (02/26/23 1207)  Toilet Transfers Comments: CGA on ModA off (02/26/23 1207)          Speech Therapy:      Comprehension:  (AUditory comprehension is [facility])  Verbal Expression:  (Mild to moderate deficits noted with divergent and convergent naming tasks. Min assist required to improve naming skills.)  Diet/Swallow:           Compensatory Swallowing Strategies : Alternate solids and liquids, Eat/Feed slowly, Upright as possible for all oral intake, Small bites/sips          Lab/X-ray studies reviewed, analyzed and discussed with patient and staff:   Recent Results (from the past 24 hour(s))   Culture, Body Fluid    Collection Time: 02/27/23 10:02 AM    Specimen: Thoracentesis; Body Fluid   Result Value Ref Range    Body Fluid Culture, Sterile       Direct Exam:  FEW NEUTROPHILS  Direct Exam:  NO BACTERIA SEEN  Direct Exam:  Gram stain made from cytocentrifuged specimen. Organisms  Direct Exam:  and cells will be concentrated. Cult,Fluid:  NO GROWTH 12 HOURS  Performed at [address] [place] [address]  [phone]     COVID-19, Rapid    Collection Time: 02/27/23 11:58 AM    Specimen: Nasopharyngeal Swab; Nasal swab   Result Value Ref Range    SARS-CoV-2, NAAT Not Detected Not Detected   Protime-INR    Collection Time: 02/28/23  4:52 AM   Result Value Ref Range    Protime 30.0 (H) 12.3 - 14.9 sec    INR 2.8        XR ABDOMEN   2/18/2023   1. Complete opacification of left hemithorax compatible with some combination of pleural effusion and atelectasis. Secondary obscuration of left heart border. 2.  Nonobstructive bowel gas pattern. No acute abdominal disease identified. 3.  Probable left renal stone. CT CHEST   2/23/2023 : Mediastinum: Thyroid is homogeneous in appearance. Vascular calcifications seen within the coronary vessels. Cardiac chambers are mildly enlarged. Pacer leads in satisfactory position.   Moderate-sized pericardial effusion. Bulky adenopathy identified in the left hilar region likely reactive. Small lymph nodes seen scattered throughout the mediastinum likely reactive. Atherosclerotic disease seen diffusely throughout the thoracic aorta. Lungs/pleura: There is small chest tube identified in place on the left with small left pleural effusion. Airspace consolidation seen within the left upper and lower lung fields suggesting superimposed infiltrate such as pneumonia. Mild increased markings identified at the right lung base to suggest atelectatic change. Trace pneumothorax identified anteriorly. Upper Abdomen: Stones in the gallbladder. Small hiatal hernia. Soft Tissues/Bones: Multilevel degenerative changes seen within the spine. No acute chest wall abnormality. Indwelling chest tube identified on the left with small left pleural effusion and trace anterior pneumothorax. Consolidation seen in airspace disease throughout the left upper and lower lobes to suggest a multifocal superimposed pneumonia. Soft tissue density seen in the hilar region to suggest possible reactive adenopathy. Moderate-sized pericardial effusion. Minimal atelectatic changes seen at the right lung base. Incidental stones in the gallbladder with small hiatal hernia. XR CHEST   2/19/2023   1. Minimal partial clearing of the left lung. The previously noted left pleural effusion is smaller 2. Stable position of the left chest tube 3. The right lung is clear. XR CHEST  2/18/2023 There is opacification of the left hemithorax. No evidence of pneumothorax. Air bronchograms are noted in the perihilar region. Since the prior study there appears to been placement of a left-sided chest tube with its tip near the left upper chest apex. Right-sided dual lead pacemaker is unchanged. Heart appears enlarged. Mild reticular opacities in the right lung are unchanged. Stable degenerative changes in the left shoulder and AC joint.      1. Apparent interval placement of left chest tube catheter. No pneumothorax. 2.  Persistent opacification of the left hemithorax. XR CHEST  2023   1. Near complete whiteout of the left hemithorax likely represent a combination of partial collapse of the left lung and large pleural effusion 2. Cardiomegaly 3. The right lung is grossly clear. US DUP UPPER EXTREMITY RIGHT VENOUS  2023  There is normal flow and compressibility of the visualized venous structures. There is no evidence of echogenic thrombus. The veins demonstrate good compressibility with normal color flow study and spectral analysis. No evidence of DVT. FLUORO FOR SURGICAL PROCEDURES  2023  12 spot images of the lumbar spine were obtained. Intraprocedural fluoroscopic spot images as above. See separate procedure report for more information. EGD 2023  [id number] [facility] Patient: Maryam Mcgrath MRN: [id number] : 1935 Account: Gender: Male Age: 82 Years Procedure: Upper GI endoscopy Date: 2023 Attending Physician: Julio Thomason Indications:        -  Anemia        -  Melena Medications:        -  Monitored Anesthesia Care Complications:        -  No immediate complications. Estimated Blood Loss:        -  Estimated blood loss: none. Procedure:        - The Gastroscope was introduced through the mouth and advanced to the second           part of the duodenum.        -  The patient tolerated the procedure well. Findings:        -  A 2 cm hernia was found.        -  Esophagogastric landmarks were identified: the gastroesophageal junction           was found at 36 cm, the lower esophageal sphincter was found at 38 cm and the           upper extent of the gastric folds was found at 36 cm from the incisors. -  Patchy moderate inflammation characterized by erythema was found in the           gastric antrum and in the gastric body.         -  The examined duodenum was normal.        -  The cardia and gastric fundus were normal on retroflexion.        - No old or fresh blood noted Impression:        -  2 cm hernia. -  Esophagogastric landmarks identified.        -  Gastritis. -  Normal examined duodenum.        -  No specimens collected. - No old or fresh blood noted Recommendation:        -  Put patient on a clear liquid diet starting today. -  Continue present medications. - Further recommendations per inpatient team Procedure Code(s):        - 35464, Esophagogastroduodenoscopy, flexible, transoral; diagnostic,           including collection of specimen(s) by brushing or washing, when performed           (separate procedure) Diagnosis Code(s):        - D64.9, Anemia, unspecified        - K92.1, Melena (includes Hematochezia)        - K44.9, Diaphragmatic hernia without obstruction or gangrene        - K29.70, Gastritis, unspecified, without bleeding       CPT(R) - 2022 copyright [employer]. All Rights Reserved. The CPT codes, CCI edits and ICD codes generated are intended as suggestions       and were generated based on input data. These codes are preliminary and upon        review may be revised to meet current compliance and payer requirements. The provider is responsible for the final determination of appropriate codes,       and modifiers. Scope Withdrawal Time:       00:07:45 Houston Sarkar MD This document has been electronically signed. Previous extensive, complex labs, notes and diagnostics reviewed and analyzed. ALLERGIES:    Allergies as of 02/25/2023 - Fully Reviewed 02/25/2023   Allergen Reaction Noted    Benadryl [diphenhydramine hcl] Anxiety 01/31/2012    Diphenhydramine Anxiety 05/09/2018      (please also verify by checking MAR)      Recently, I evaluated this patient for periodic reassessment of medical and functional status.   The patient was discussed in detail at the treatment team meeting focusing on current medical issues, progress in therapies, social issues, psychological issues, barriers to progress and strategies to address these barriers, and discharge planning. See the hand written addendum to rehab progress note. The patient continues to be high risk for future disability and their medical and rehabilitation prognosis continue to be good and therefore, we will continue the patient's rehabilitation course as planned. The patient's tentative discharge date was set. Patient and family education was discussed. The patient was made aware of the team discussion regarding their progress. Discharge plans were discussed along with barriers to progress and strategies to address these barriers, patient encouraged to continue to discuss discharge plans with . Complex Physical Medicine & Rehab Issues Assess & Plan:   Severe abnormality of gait and mobility and impaired self-care and ADL's secondary to progressive cardiopulmonary debility. Functional and medical status reassessed regarding patients ability to participate in therapies and patient found to be able to participate in acute intensive comprehensive inpatient rehabilitation program including PT/OT to improve balance, ambulation, ADLs, and to improve the P/AROM. Therapeutic modifications regarding activities in therapies, place, amount of time per day and intensity of therapy made daily. In bed therapies or bedside therapies prn. Bowel and Bladder dysfunction  , Neurogenic bowel and bladder:  frequent toileting, ambulate to bathroom with assistance, check post void residuals. Check for C.difficile x1 if >2 loose stools in 24 hours, continue bowel & bladder program.  Monitor bowel and bladder function. Lactinex 2 PO every AC. MOM prn, Brown Bomb prn, Glycerin suppository prn, enema prn. Encourage therapy and nursing to co-treat and problem solve re continence.     Severe back pain, lumbar, as well as generalized OA pain: reassess pain every shift and prior to and after each therapy session, give prn Tylenol and consider scheduled Tylenol, modalities prn in therapy, masage, Lidoderm, K-pad prn. Consider scheduled AM pain meds. Skin healing  ears and breakdown risk:  continue pressure relief program.  Daily skin exams and reports from nursing. Fatigue due to nutritional and hydration deficiency: Add and titrate vitamin B12 vitamin D and CoQ10 continue to monitor I&Os, calorie counts prn, dietary consult prn. Add healthy snack at night. Acute episodic insomnia with situational adjustment disorder:  prn Ambien, monitor for day time sedation. Falls risk elevated:  patient to use call light to get nursing assistance to get up, bed and chair alarm. Elevated DVT risk: progressive activities in PT, continue prophylaxis JOB hose, elevation and meds-see MAR. Complex discharge planning: Discharge March 12, 2023 home with his wife and home health care. Weekly team meeting every Monday to re-assess progress towards goals, discuss and address social, psychological and medical comorbidities and to address difficulties they may be having progressing in therapy. Patient and family education is in progress. The patient is to follow-up with their family physician after discharge. Complex Active General Medical Issues that complicate care Assess & Plan:    Mesothelioma-titrate O2, aerosols, follow-up with Dr. Andrew Vital, drain pleural Dex daily versus 3 times a week per protocols from pulmonology and lung surgery consult  Mixed hyperlipidemia,  Atherosclerosis of native artery of both lower extremities with intermittent claudication, Atrial fibrillation, Venous insufficiency-Acute rehab to monitor heart rate and rhythm with the option of telemetry and the effects of chronotropic medication with respect to increasing physical activity and exercise in PT, OT, ADLs with medication titration to lowest effective dosing.   Continue blood signs every shift focusing on heart rate, rhythm and blood pressure checks with orthostatic checks-monitoring the effect of exercise, therapy and posture. Consult hospitalist for backup medical and adjust/add medications (aspirin, Lasix, Lopressor, Crestor, Coumadin, bridging Lovenox). Monitor heart rate and blood pressure as well as medications effects on vital signs before during and after therapy with especial focus on preventing orthostasis and falls risk. Gastritis without bleeding-Elevate head of bed after meals, monitor stools for blood, lowest effective dose of PPI, consider Tums. Pneumonia of both lungs due to infectious organism-Merrem Acute rehab for endurance traing with Pulse Ox to monitoring oxygen saturation and heart rate with O2 titration to lowest effective dose. Pulse oximeter checks to shift and at HS to dose and titrate oxygen and aerosol treatments monitor for nocturnal hypoxemia, monitor vital signs, oxygen prn. Focus on energy conservation. eft a Pleurx tube in O2   drain  GERD-Elevate head of bed after meals, monitor stools for blood, lowest effective dose of PPI, consider Tums. Anticoagulation coumadinization for [id number] pharmacist regarding INR management    Acute kidney failure-Eliminate toxic medications, monitor I's and O's focusing on urine output, recheck BMP. Spinal stenosis of lumbar region with neurogenic claudication    Balance disorder-focus on balance and therapy     Focus on coordinating dc plans with patient family and care givers and order necessary equipment.         Electronically signed by Josefa Bain DO on 2/27/23 at 8:24 AM WARREN Kingston D.O., PM&R     Attending    [address]

## 2023-02-28 NOTE — PROGRESS NOTES
Pt in w/c in room, per therapy pt participated in last session on RA and oxygen saturation was in the 90's. Pt on RA, oxygen sats 94%, pt not SOB, call light in reach. Wife at bedside. Electronically signed by Mir Dupree RN on 2/28/2023 at 2:42 PM 01-Feb-2020 16:55

## 2023-02-28 NOTE — PLAN OF CARE
Problem: Discharge Planning  Goal: Discharge to home or other facility with appropriate resources  2/28/2023 1429 by Amilcar Camacho RN  Outcome: Progressing  2/28/2023 0316 by Quang Buck RN  Outcome: Progressing     Problem: Pain  Goal: Verbalizes/displays adequate comfort level or baseline comfort level  2/28/2023 1429 by Amilcar Camacho RN  Outcome: Progressing  2/28/2023 0316 by Quang Buck RN  Outcome: Progressing     Problem: Safety - Adult  Goal: Free from fall injury  2/28/2023 1429 by Amilcar Camacho RN  Outcome: Progressing  2/28/2023 0316 by Quang Buck RN  Outcome: Progressing     Problem: ABCDS Injury Assessment  Goal: Absence of physical injury  2/28/2023 1429 by Amilcar Camacho RN  Outcome: Progressing  2/28/2023 0316 by Quang Buck RN  Outcome: Progressing     Problem: Chronic Conditions and Co-morbidities  Goal: Patient's chronic conditions and co-morbidity symptoms are monitored and maintained or improved  2/28/2023 1429 by Amilcar Camacho RN  Outcome: Progressing  2/28/2023 0316 by Quang Buck RN  Outcome: Progressing

## 2023-02-28 NOTE — FLOWSHEET NOTE
RN drained the patient's pleurex catheter. There was 275 ml of serosanguinous fluid drained. Patient tolerated well. A new cap and sterile dressing applied.

## 2023-03-01 LAB
EKG ATRIAL RATE: 63 BPM
EKG P AXIS: 14 DEGREES
EKG P-R INTERVAL: 214 MS
EKG Q-T INTERVAL: 378 MS
EKG QRS DURATION: 94 MS
EKG QTC CALCULATION (BAZETT): 386 MS
EKG R AXIS: 31 DEGREES
EKG T AXIS: 14 DEGREES
EKG VENTRICULAR RATE: 63 BPM
INR BLD: 3.2
PROTHROMBIN TIME: 33.2 SEC (ref 12.3–14.9)
SARS-COV-2, NAAT: NOT DETECTED

## 2023-03-01 PROCEDURE — 99232 SBSQ HOSP IP/OBS MODERATE 35: CPT | Performed by: PHYSICAL MEDICINE & REHABILITATION

## 2023-03-01 PROCEDURE — 36415 COLL VENOUS BLD VENIPUNCTURE: CPT

## 2023-03-01 PROCEDURE — 2700000000 HC OXYGEN THERAPY PER DAY

## 2023-03-01 PROCEDURE — 97130 THER IVNTJ EA ADDL 15 MIN: CPT

## 2023-03-01 PROCEDURE — 97535 SELF CARE MNGMENT TRAINING: CPT

## 2023-03-01 PROCEDURE — 97110 THERAPEUTIC EXERCISES: CPT

## 2023-03-01 PROCEDURE — 97116 GAIT TRAINING THERAPY: CPT

## 2023-03-01 PROCEDURE — 2580000003 HC RX 258: Performed by: FAMILY MEDICINE

## 2023-03-01 PROCEDURE — 1180000000 HC REHAB R&B

## 2023-03-01 PROCEDURE — 6370000000 HC RX 637 (ALT 250 FOR IP): Performed by: FAMILY MEDICINE

## 2023-03-01 PROCEDURE — 87635 SARS-COV-2 COVID-19 AMP PRB: CPT

## 2023-03-01 PROCEDURE — 6360000002 HC RX W HCPCS: Performed by: FAMILY MEDICINE

## 2023-03-01 PROCEDURE — 6370000000 HC RX 637 (ALT 250 FOR IP): Performed by: PHYSICAL MEDICINE & REHABILITATION

## 2023-03-01 PROCEDURE — 97129 THER IVNTJ 1ST 15 MIN: CPT

## 2023-03-01 PROCEDURE — 85610 PROTHROMBIN TIME: CPT

## 2023-03-01 RX ADMIN — ROSUVASTATIN CALCIUM 10 MG: 5 TABLET, FILM COATED ORAL at 09:20

## 2023-03-01 RX ADMIN — FUROSEMIDE 20 MG: 20 TABLET ORAL at 09:20

## 2023-03-01 RX ADMIN — ASPIRIN 81 MG: 81 TABLET, COATED ORAL at 09:20

## 2023-03-01 RX ADMIN — Medication 5 MG: at 21:46

## 2023-03-01 RX ADMIN — METOPROLOL TARTRATE 25 MG: 25 TABLET, FILM COATED ORAL at 09:19

## 2023-03-01 RX ADMIN — METOPROLOL TARTRATE 25 MG: 25 TABLET, FILM COATED ORAL at 21:46

## 2023-03-01 RX ADMIN — NALOXEGOL OXALATE 25 MG: 12.5 TABLET, FILM COATED ORAL at 09:19

## 2023-03-01 RX ADMIN — MEROPENEM 1000 MG: 1 INJECTION, POWDER, FOR SOLUTION INTRAVENOUS at 12:34

## 2023-03-01 RX ADMIN — GUAIFENESIN 600 MG: 600 TABLET ORAL at 09:20

## 2023-03-01 RX ADMIN — GUAIFENESIN 600 MG: 600 TABLET ORAL at 21:46

## 2023-03-01 RX ADMIN — Medication 2000 UNITS: at 16:52

## 2023-03-01 RX ADMIN — Medication 100 MG: at 09:20

## 2023-03-01 RX ADMIN — MEROPENEM 1000 MG: 1 INJECTION, POWDER, FOR SOLUTION INTRAVENOUS at 04:50

## 2023-03-01 ASSESSMENT — PAIN SCALES - GENERAL: PAINLEVEL_OUTOF10: 0

## 2023-03-01 NOTE — PROGRESS NOTES
Progress Note    Date:3/1/2023       [facility]  Patient Name:Garth Peoples     YOB: 1935     Age:87 y.o. Assessment        Hospital Problems             Last Modified POA    * (Principal) Impaired mobility and activities of daily living dt CP debility 2/26/2023 Yes    Mixed hyperlipidemia 2/26/2023 Yes    Gastritis without bleeding 2/26/2023 Yes    Pneumonia of both lungs due to infectious organism 2/26/2023 Yes    Acute kidney failure ([address] 2/26/2023 Yes    Spinal stenosis of lumbar region with neurogenic claudication 2/26/2023 Yes    Atherosclerosis of native artery of both lower extremities with intermittent claudication ([address] 2/26/2023 Yes    Atrial fibrillation ([address] 2/26/2023 Yes    Overview Signed 2/2/2021  3:01 PM by EDELMIRA Szymanski CNP     Past cardioversion         Venous insufficiency 2/26/2023 Yes    Balance disorder 2/26/2023 Yes       Plan:      *Impaired mobility and ADLs due to CP debility-managed by Dr. Melvina Contreras    *Loculated pleural effusion-ID following on meropenem; spoke with Dr. Bethany Gilbert regarding atb. Second Pleural fluid  culture negative; ok to D/c meropenem    *Paroxysmal [id number] by cardiology. ECG on 2/27/2023 atrial paced normal sinus rhythm on aspirin, metoprolol for rate control, and Coumadin pharmacy dose. *HTN-current BP within normal range 136/60; on metoprolol    *CHF-left ventricular ejection fraction 58%; on Lasix    *Hyperlipidemia-Crestor 10 mg daily    Hospital medicine managing acute needs. Patient will need to follow-up PCP for chronic disease management. Time spent with patient 35 minutes. Greater than 70% of time  spent focused exclusively on this patient ,reviewing  chart,  reconciling medications, &  answering questions with patient and discussing plan. Subjective   Interval History Status: improved.    Patient is a 27-year-old male with a pertinent past medical history of A-fib, CKD, mesothelioma, sick sinus syndrome admitted to acute rehab services for PT and OT. Patient originally admitted for spinal cord stimulator permanent placement on 2/16/2023 at [facility] during hospital stay patient's shortness of breath increased. On 2/19/2023 CSF tested positive for staph epidermidis. Patient found to have loculated pleural effusion in which he was started on antibiotics for. Patient continues on meropenem. Patient denies chest pain, palpitations, headache, dizziness, shortness of breath, cough, N/V/D, and changes in appetite. Review of Systems   12 point review of systems reviewed with patient with pertinent positives listed in HPI otherwise ROS negative  Medications   Scheduled Meds:    Vitamin D  2,000 Units Oral Dinner    cyanocobalamin  1,000 mcg IntraMUSCular Weekly    coenzyme Q10  100 mg Oral Daily    warfarin placeholder: dosing by pharmacy   Other RX Placeholder    aspirin  81 mg Oral Daily    furosemide  20 mg Oral Daily    guaiFENesin  600 mg Oral BID    melatonin  5 mg Oral Nightly    meropenem  1,000 mg IntraVENous Q8H    metoprolol tartrate  25 mg Oral BID    naloxegol  25 mg Oral QAM    rosuvastatin  10 mg Oral Daily     Continuous Infusions:   PRN Meds: lidocaine, polyvinyl alcohol, bisacodyl, sodium phosphate, acetaminophen, ipratropium-albuterol, ondansetron **OR** ondansetron, oxyCODONE **OR** oxyCODONE    Past History    Past Medical History:   has a past medical history of A-fib ([address] Arthritis, Baker's cyst of knee, left, Cancer ([address] Cerebral artery occlusion with cerebral infarction ([address] Congestive heart failure ([address] Coronary artery disease, HTN (hypertension), Hx of blood clots, Hyperlipidemia, Pacemaker, Polycythemia, and Torn meniscus. Social History:   reports that he quit smoking about 54 years ago. His smoking use included cigarettes. He has a 5.00 pack-year smoking history. He has never used smokeless tobacco. He reports current alcohol use. He reports that he does not use drugs.     Family History:   Family History   Problem Relation Age of Onset    Heart Attack Mother     Other Mother          in MVA    Other Father          at age 97    Other Sister          as infant    Cancer Brother     Other Brother         unknown medical hx    Other Brother          at age 60 due to accident    Cancer Daughter         colon & lung cancer    Colon Cancer Daughter     No Known Problems Son     Colon Cancer Other     Breast Cancer Other        Physical Examination      Vitals:  /61   Pulse 90   Temp 98.1 °F (36.7 °C) (Oral)   Resp 17   Ht 5' 7\" (1.702 m)   Wt 261 lb 14.4 oz (118.8 kg)   SpO2 100%   BMI 41.02 kg/m²   Temp (24hrs), Av.2 °F (36.8 °C), Min:98.1 °F (36.7 °C), Max:98.2 °F (36.8 °C)      I/O (24Hr):  No intake or output data in the 24 hours ending 23 0921      CONSTITUTIONAL:  Awake, alert, no apparent distress, and appears stated age  EYES: pupils equal, round and reactive to light   NECK:  Supple   LUNGS:  No increased work of breathing, rhonchi to left upper and lower lung bases,pleurex cath draining.   CARDIOVASCULAR:  Normal apical impulse, regular rate and rhythm  ABDOMEN:  No scars, normal bowel sounds, soft, non-distended, non-tender  MUSCULOSKELETAL:  There is no redness, warmth, or swelling of the joints.  Full range of motion noted  NEUROLOGIC:  Awake, alert.  No focal deficits noted  SKIN:  No bruising or bleeding, normal skin color, texture, turgor, no redness, warmth, or swelling, no rashes, and no lesions    Labs/Imaging/Diagnostics   Labs:  CBC:No results for input(s): WBC, RBC, HGB, HCT, MCV, RDW, PLT in the last 72 hours.  CHEMISTRIES:No results for input(s): NA, K, CL, CO2, BUN, CREATININE, GLUCOSE, CA, PHOS, MG in the last 72 hours.  PT/INR:  Recent Labs     23  0501 23  0452 23  0542   PROTIME 30.1* 30.0* 33.2*   INR 2.9 2.8 3.2     APTT:No results for input(s): APTT in the last 72 hours.  LIVER PROFILE:No results  for input(s): AST, ALT, BILIDIR, BILITOT, ALKPHOS in the last 72 hours. Imaging Last 24 Hours:  No results found.     Electronically signed by EDELMIRA Burciaga CNP on 3/1/23 at 9:21 AM EST

## 2023-03-01 NOTE — PROGRESS NOTES
Physical Therapy Rehab Treatment Note  Facility/Department: [facility]  Room: Q93N272-68       NAME: Walter Amado  : 1935 (82 y.o.)  MRN: [id number]  CODE STATUS: Full Code    Date of Service: 3/1/2023       Restrictions:  Restrictions/Precautions: Fall Risk  Position Activity Restriction  Other position/activity restrictions: pleurx on L side       SUBJECTIVE:   Subjective: \" I slept good last night\"    Pain  Pain: 6/10 Sore L shoulder pre and post session  RN notified      OBJECTIVE:      Outcomes Measures:  Timed Up and Go: 36.6 (With ww)      Sit to Stand  Assistance Level: Stand by assist  Skilled Clinical Factors: Multiple attempts to rise  Stand to Sit  Assistance Level: Supervision  Toilet transfer   Assistance level: Stand by assistance    Ambulation  Surface: Carpet; Level surface  Device: Rolling walker  Distance: 70' x 1  Activity: Within Unit; Within Room  Activity Comments: Slow jefe increased lateral excursion, decreased heel strike L>R  Assistance Level: Stand by assist;Supervision  Gait Deviations: Slow jefe;Decreased heel strike left  Skilled Clinical Factors: Completed without O2 donned. Maintains 93%+ with all gait trials    Neuro: TEIXEIRA balance tasks standing unsupported x 2 minutes, NBOS x 1 minute, semi tandem stance x 30 seconds    ASSESSMENT/PROGRESS TOWARDS GOALS:   Body Structures, Functions, Activity Limitations Requiring Skilled Therapeutic Intervention: Decreased functional mobility ; Decreased safe awareness;Decreased strength;Decreased endurance;Decreased balance;Decreased posture  Assessment  Assessment: Patient continues to exhibit SOB with gait training maintains 93% without O 2 donned.  Patient continues to require multiple attempts to stand from multiple surfaces  Activity Tolerance: Patient tolerated treatment well  Discharge Recommendations: Continue to assess pending progress    Goals:  Long Term Goals  Long Term Goal 1: indep bed mobility  Long Term Goal 2: Pt will demonstrate transfers supervision with safest AD  Long Term Goal 3: Pt will demonstrate amb >/= 150ft supervision with safest AD  Long Term Goal 4: Pt will demonstrate stair negotiation 3 steps without rails with safest AD SBA  Long Term Goal 5: Pt will demonstrate pantoja balance assessment >/= 45/56 for decreased risk for falls. Patient Goals   Patient Goals : Rexanne Chaitanya out of chairs easier. drive a car.  stand for longer time. \"    PLAN OF CARE/Safety:   Safety Devices  Type of Devices: All fall risk precautions in place; Chair alarm in place      Therapy Time:   Individual   Time In 1330   Time Out 1400   Minutes 30     Minutes: 30  Transfer/Bed mobility training: 10  Gait training: 15  Neuro re education: [address] [place] [address] [facility], 03/01/23 at 4:15 PM

## 2023-03-01 NOTE — PLAN OF CARE
Problem: Discharge Planning  Goal: Discharge to home or other facility with appropriate resources  3/1/2023 0140 by Aime Dove RN  Outcome: Progressing     Problem: Pain  Goal: Verbalizes/displays adequate comfort level or baseline comfort level  3/1/2023 0140 by Aime Dove RN  Outcome: Progressing     Problem: Safety - Adult  Goal: Free from fall injury  3/1/2023 0140 by Aime Dove RN  Outcome: Progressing     Problem: ABCDS Injury Assessment  Goal: Absence of physical injury  3/1/2023 0140 by Aime Dove RN  Outcome: Progressing     Problem: Chronic Conditions and Co-morbidities  Goal: Patient's chronic conditions and co-morbidity symptoms are monitored and maintained or improved  3/1/2023 0140 by Aime Dove RN  Outcome: Progressing

## 2023-03-01 NOTE — PROGRESS NOTES
Subjective: The patient complains of moderate to severe acute on chronic progressive fatigue and  PRIETO partially relieved by rest, medications, PT,  OT,   SLP and rest and exacerbated by recent illness  . Patient had initially been treated at [facility] [place] where a nerve stimulator placement was attempted but delayed due to high INR. Once the procedure was finally initiated it was aborted because of possible CSF leak. Patient became shortness of breath postprocedure but this was found to be largely unrelated to the procedure but related to a large loculated pleural effusion. Patient was transferred to [facility] for thoracotomy and VATS procedure performed by Dr. Linh Moreno. As noted patient had a chest tube inserted by Dr. Linh Moreno on 2/18/2023. He has been followed by thoracic surgery pulmonology oncology GI and cardiology as well as the hospitalist.     From a thoracic surgery standpoint cultures were sent from his pleural effusion which were positive and he is now on IV antibiotics. The left a Pleurx tube in O2   drain 3 times a week. The plan is for outpatient removal of the drain and if any worsening symptoms occur after the Pleurx is removed possible VATS surgery. They are trying to avoid surgery for now. Pulmonology followed him advising titrating O2 to keep sats above 90 continue home CPAP and continue draining the Pleurx daily. 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 He they noted his symptoms were improving and he has a history of following with Dr. Karena Curling at [facility] in [facility] regarding his mesothelioma. Discussed options regarding possible chemotherapy palliative care intermittent drainage of the Pleurx catheter and possible hospice care. Currently they are advising treating anemia with IV Venofer here.   Cardiology was consulted and obtain an echocardiogram to assess the pericardial effusion    I am concerned about patients medical complexities and barriers to advancing in rehab goals including  improved pain control. I reviewed current care and plans for further care with other rehab providers including nursing and case management. According to recent nursing note, \"VSS. Oxygen weaned off. Tolerating RA without difficulty. Denies feeling SOB at rest but does report SOB upon exertion. LBM 3/1. Reported 4/10 generalized, \"all over\" aching type pain. Offered prn tylenol or hemanth. Patient verbalized his request for prn hemanth. Pleurex cath to left side. Dressing remains c/d/I. Orders for M/W/F draining- last drained 2/27 for 275ml of serosang fluid. Continues IV merrem for pna. No confirmed stop date on [facility]- ID following. INR- 2.8. 4mg coumadin given per pharmacy dosing. Cpap on (patient's own home unit.) No distress noted. Call light within reach and bed alarm activated. \".    I am surprised to see they were able to wean him off of his nasal cannula O2 during the day he will continue CPAP at night. ROS x10: The patient also complains of severely impaired mobility and activities of daily living. Otherwise no new problems with vision, hearing, nose, mouth, throat, dermal, cardiovascular, GI, , pulmonary, musculoskeletal, psychiatric or neurological. See also Acute Rehab PM&R H&P. Vital signs:  /61   Pulse 90   Temp 98.1 °F (36.7 °C) (Oral)   Resp 17   Ht 5' 7\" (1.702 m)   Wt 261 lb 14.4 oz (118.8 kg)   SpO2 100%   BMI 41.02 kg/m²   I/O:   PO/Intake:  fair PO intake,   Reg diet    Bowel:   continent LBM 2/27  Bladder: continent    bishop  General:  Patient is well developed,   adequately nourished, and    well kempt. HEENT:    Pupils equal, hearing intact to loud voice, external inspection of ear and nose benign. Inspection of lips, tongue and gums  thrush    Musculoskeletal: No significant change in strength or tone. All joints stable.       Inspection and palpation of digits and nails show no clubbing, cyanosis or inflammatory conditions.   Neuro/Psychiatric: Affect: flat but pleasant.  Alert and oriented to person, place and situation with  min cues.  No significant change in deep tendon reflexes or sensation  Lungs:  Diminished, CTA-B. Respiration effort is   normal at rest.     Heart:   S1 = S2,   RRR.       Abdomen:  Soft, non-tender, no enlargement of liver or spleen.  Extremities:   mod lower extremity edema    Skin:   Intact to general survey,      Rehabilitation:  Physical Therapy:   Bed mobility:  Bed mobility  Rolling to Left: Minimal assistance (02/26/23 1242)  Rolling to Right: Minimal assistance (02/26/23 1242)  Supine to Sit: Moderate assistance (02/26/23 1242)  Sit to Supine: Moderate assistance (02/26/23 1242)  Bed Mobility Comments: HOB flat; cues to avoid breath holding (02/26/23 1242)  Roll Left  Assistance Level: Minimal assistance (02/27/23 0942)  Roll Right  Assistance Level: Minimal assistance (02/27/23 0942)  Sit to Supine  Assistance Level: Moderate assistance (02/27/23 0942)  Skilled Clinical Factors: Assist BLE into bed (02/27/23 0942)  Supine to Sit  Assistance Level: Moderate assistance (02/27/23 0942)  Skilled Clinical Factors: Assist trunk to seated position (02/27/23 0942)  Scooting  Assistance Level: Stand by assist (02/27/23 0942)  Transfers:  Transfers  Sit to Stand: Moderate Assistance (02/26/23 1243)  Stand to Sit: Moderate Assistance (02/26/23 1243)  Comment: poor use of L LE during sit to stand; WW used (02/26/23 1243)  Sit to Stand  Assistance Level: Stand by assist (02/28/23 1411)  Skilled Clinical Factors: Multiple attempts to rise 2 sets of 5 transfers from EOM to improve technique and LE strength (02/28/23 1411)  Stand to Sit  Assistance Level: Supervision (02/28/23 1411)  Skilled Clinical Factors: Improved hand placement eccentric control (02/28/23 1411)  Bed To/From Chair  Technique: Stand step (02/28/23 0950)  Assistance Level: Stand by assist (02/28/23 0950)  Car  Transfer  Assistance Level: Minimal assistance (02/27/23 0942)  Skilled Clinical Factors: [place] to rise from low level chair able to lift BLE in and out of car (02/27/23 0942)  Gait:   Ambulation  Surface: Level tile (02/26/23 1244)  Device: Rolling Walker (02/26/23 1244)  Other Apparatus: O2 (02/26/23 1244)  Assistance: Contact guard assistance (02/26/23 1244)  Quality of Gait: forward flexed trunk, heavy UE use and support, SOB , maintains unsafe distance inside AD (02/26/23 1244)  Gait Deviations: Slow Jefe; Increased MENA; Decreased step length;Decreased step height (02/26/23 1244)  Distance: 35ft (02/26/23 1244)  Ambulation  Surface: Carpet (02/28/23 0951)  Device: Rolling walker (02/28/23 0951)  Distance: 50' x 3 (02/28/23 0951)  Activity: Within Unit (02/28/23 2621)  Activity Comments: Slow jefe increased lateral excursion, decreased heel strike L>R (02/28/23 0951)  Additional Factors: Verbal cues (02/28/23 0951)  Assistance Level: Stand by assist (02/28/23 1411)  Gait Deviations: Slow jefe;Decreased heel strike left (02/28/23 0951)  Skilled Clinical Factors: Trialed gait on 4L, 3L and 2L 4L- 98%, 3L- 97% 2L- 97% (02/28/23 0951)  Stairs:  Stairs/Curb  Stairs?: No (02/26/23 1244)  Stairs  Stair Height: 6'' (02/28/23 3645)  Device: Bilateral handrails (02/28/23 0951)  Number of Stairs: 4 (02/28/23 0951)  Additional Factors: Verbal cues; Non-reciprocal going down;Reciprocal going up (02/28/23 0951)  Assistance Level: Stand by assist (02/28/23 0951)  Skilled Clinical Factors: Completed with 2L 93% post stair negotiation improved 2 96% with seated rest break (02/28/23 0951)  W/C mobility:       Occupational Therapy:   Hand Dominance: Right  ADL  Feeding: Stand by assistance (02/26/23 1201)  Grooming: Minimal assistance (02/26/23 1201)  UE Bathing: Minimal assistance (02/26/23 1201)  LE Bathing: Maximum assistance (02/26/23 1201)  LE Bathing Skilled Clinical Factors: periare only per pt request (02/26/23 1201)  UE Dressing: Moderate assistance (02/26/23 1201)  LE Dressing: Maximum assistance (02/26/23 1201)  Toileting: Maximum assistance (02/26/23 1201)  Additional Comments: sponge bath ADL completed sitting EOB and patially on toilet. (02/26/23 1201)  Toilet Transfers  Toilet - Technique: Ambulating (02/26/23 1207)  Equipment Used: Grab bars (02/26/23 1207)  Toilet Transfer: Moderate assistance (02/26/23 1207)  Toilet Transfers Comments: CGA on ModA off (02/26/23 1207)          Speech Therapy:      Comprehension:  (AUditory comprehension is [facility])  Verbal Expression:  (Mild to moderate deficits noted with divergent and convergent naming tasks. Min assist required to improve naming skills.)  Diet/Swallow:           Compensatory Swallowing Strategies : Alternate solids and liquids, Eat/Feed slowly, Upright as possible for all oral intake, Small bites/sips          Lab/X-ray studies reviewed, analyzed and discussed with patient and staff:   Recent Results (from the past 24 hour(s))   Protime-INR    Collection Time: 03/01/23  5:42 AM   Result Value Ref Range    Protime 33.2 (H) 12.3 - 14.9 sec    INR 3.2        XR ABDOMEN   2/18/2023   1. Complete opacification of left hemithorax compatible with some combination of pleural effusion and atelectasis. Secondary obscuration of left heart border. 2.  Nonobstructive bowel gas pattern. No acute abdominal disease identified. 3.  Probable left renal stone. CT CHEST   2/23/2023 : Mediastinum: Thyroid is homogeneous in appearance. Vascular calcifications seen within the coronary vessels. Cardiac chambers are mildly enlarged. Pacer leads in satisfactory position. Moderate-sized pericardial effusion. Bulky adenopathy identified in the left hilar region likely reactive. Small lymph nodes seen scattered throughout the mediastinum likely reactive. Atherosclerotic disease seen diffusely throughout the thoracic aorta. Lungs/pleura:  There is small chest tube identified in place on the left with small left pleural effusion. Airspace consolidation seen within the left upper and lower lung fields suggesting superimposed infiltrate such as pneumonia. Mild increased markings identified at the right lung base to suggest atelectatic change. Trace pneumothorax identified anteriorly. Upper Abdomen: Stones in the gallbladder. Small hiatal hernia. Soft Tissues/Bones: Multilevel degenerative changes seen within the spine. No acute chest wall abnormality. Indwelling chest tube identified on the left with small left pleural effusion and trace anterior pneumothorax. Consolidation seen in airspace disease throughout the left upper and lower lobes to suggest a multifocal superimposed pneumonia. Soft tissue density seen in the hilar region to suggest possible reactive adenopathy. Moderate-sized pericardial effusion. Minimal atelectatic changes seen at the right lung base. Incidental stones in the gallbladder with small hiatal hernia. XR CHEST   2/19/2023   1. Minimal partial clearing of the left lung. The previously noted left pleural effusion is smaller 2. Stable position of the left chest tube 3. The right lung is clear. XR CHEST  2/18/2023 There is opacification of the left hemithorax. No evidence of pneumothorax. Air bronchograms are noted in the perihilar region. Since the prior study there appears to been placement of a left-sided chest tube with its tip near the left upper chest apex. Right-sided dual lead pacemaker is unchanged. Heart appears enlarged. Mild reticular opacities in the right lung are unchanged. Stable degenerative changes in the left shoulder and AC joint. 1.  Apparent interval placement of left chest tube catheter. No pneumothorax. 2.  Persistent opacification of the left hemithorax. XR CHEST  2/18/2023   1.  Near complete whiteout of the left hemithorax likely represent a combination of partial collapse of the left lung and large pleural effusion 2. Cardiomegaly 3. The right lung is grossly clear. US DUP UPPER EXTREMITY RIGHT VENOUS  2023  There is normal flow and compressibility of the visualized venous structures. There is no evidence of echogenic thrombus. The veins demonstrate good compressibility with normal color flow study and spectral analysis. No evidence of DVT. FLUORO FOR SURGICAL PROCEDURES  2023  12 spot images of the lumbar spine were obtained. Intraprocedural fluoroscopic spot images as above. See separate procedure report for more information. EGD 2023  [id number] [facility] Patient: Seng Brandon MRN: [id number] : 1935 Account: Gender: Male Age: 82 Years Procedure: Upper GI endoscopy Date: 2023 Attending Physician: Radha Quezada Indications:        -  Anemia        -  Melena Medications:        -  Monitored Anesthesia Care Complications:        -  No immediate complications. Estimated Blood Loss:        -  Estimated blood loss: none. Procedure:        - The Gastroscope was introduced through the mouth and advanced to the second           part of the duodenum.        -  The patient tolerated the procedure well. Findings:        -  A 2 cm hernia was found.        -  Esophagogastric landmarks were identified: the gastroesophageal junction           was found at 36 cm, the lower esophageal sphincter was found at 38 cm and the           upper extent of the gastric folds was found at 36 cm from the incisors. -  Patchy moderate inflammation characterized by erythema was found in the           gastric antrum and in the gastric body. -  The examined duodenum was normal.        -  The cardia and gastric fundus were normal on retroflexion.        - No old or fresh blood noted Impression:        -  2 cm hernia. -  Esophagogastric landmarks identified.        -  Gastritis. -  Normal examined duodenum.        -  No specimens collected.         - No old or fresh blood noted Recommendation:        -  Put patient on a clear liquid diet starting today. -  Continue present medications. - Further recommendations per inpatient team Procedure Code(s):        - 33469, Esophagogastroduodenoscopy, flexible, transoral; diagnostic,           including collection of specimen(s) by brushing or washing, when performed           (separate procedure) Diagnosis Code(s):        - D64.9, Anemia, unspecified        - K92.1, Melena (includes Hematochezia)        - K44.9, Diaphragmatic hernia without obstruction or gangrene        - K29.70, Gastritis, unspecified, without bleeding           Previous extensive, complex labs, notes and diagnostics reviewed and analyzed. ALLERGIES:    Allergies as of 02/25/2023 - Fully Reviewed 02/25/2023   Allergen Reaction Noted    Benadryl [diphenhydramine hcl] Anxiety 01/31/2012    Diphenhydramine Anxiety 05/09/2018      (please also verify by checking [facility])          Complex Physical Medicine & Rehab Issues Assess & Plan:   Severe abnormality of gait and mobility and impaired self-care and ADL's secondary to progressive cardiopulmonary debility. Functional and medical status reassessed regarding patients ability to participate in therapies and patient found to be able to participate in acute intensive comprehensive inpatient rehabilitation program including PT/OT to improve balance, ambulation, ADLs, and to improve the P/AROM. Therapeutic modifications regarding activities in therapies, place, amount of time per day and intensity of therapy made daily. In bed therapies or bedside therapies prn. Bowel and Bladder dysfunction  , Neurogenic bowel and bladder:  frequent toileting, ambulate to bathroom with assistance, check post void residuals. Check for C.difficile x1 if >2 loose stools in 24 hours, continue bowel & bladder program.  Monitor bowel and bladder function. Lactinex 2 PO every AC.   MOM prn, Brown Bomb prn, Glycerin suppository prn, enema prn. Encourage therapy and nursing to co-treat and problem solve re continence. Severe back pain, lumbar, as well as generalized OA pain: reassess pain every shift and prior to and after each therapy session, give prn Tylenol and consider scheduled Tylenol, modalities prn in therapy, masage, Lidoderm, K-pad prn. Consider scheduled AM pain meds. Skin healing  ears and breakdown risk:  continue pressure relief program.  Daily skin exams and reports from nursing. Fatigue due to nutritional and hydration deficiency: Add and titrate vitamin B12 vitamin D and CoQ10 continue to monitor I&Os, calorie counts prn, dietary consult prn. Add healthy snack at night. Acute episodic insomnia with situational adjustment disorder:  prn Ambien, monitor for day time sedation. Falls risk elevated:  patient to use call light to get nursing assistance to get up, bed and chair alarm. Elevated DVT risk: progressive activities in PT, continue prophylaxis JOB hose, elevation and meds-see MAR. Complex discharge planning: Discharge March 12, 2023 home with his wife and home health care. Weekly team meeting every Monday to re-assess progress towards goals, discuss and address social, psychological and medical comorbidities and to address difficulties they may be having progressing in therapy. Patient and family education is in progress. The patient is to follow-up with their family physician after discharge.         Complex Active General Medical Issues that complicate care Assess & Plan:    Mesothelioma-titrate O2, aerosols, follow-up with Dr. Kelly Perez, drain pleural Dex daily versus 3 times a week per protocols from pulmonology and lung surgery consult  Mixed hyperlipidemia,  Atherosclerosis of native artery of both lower extremities with intermittent claudication, Atrial fibrillation, Venous insufficiency-Acute rehab to monitor heart rate and rhythm with the option of telemetry and the effects of chronotropic medication with respect to increasing physical activity and exercise in PT, OT, ADLs with medication titration to lowest effective dosing. Continue blood signs every shift focusing on heart rate, rhythm and blood pressure checks with orthostatic checks-monitoring the effect of exercise, therapy and posture. Consult hospitalist for backup medical and adjust/add medications (aspirin, Lasix, Lopressor, Crestor, Coumadin, bridging Lovenox). Monitor heart rate and blood pressure as well as medications effects on vital signs before during and after therapy with especial focus on preventing orthostasis and falls risk. Gastritis without bleeding-Elevate head of bed after meals, monitor stools for blood, lowest effective dose of PPI, consider Tums. Pneumonia of both lungs due to infectious organism-Merrem Acute rehab for endurance traing with Pulse Ox to monitoring oxygen saturation and heart rate with O2 titration to lowest effective dose. Pulse oximeter checks to shift and at HS to dose and titrate oxygen and aerosol treatments monitor for nocturnal hypoxemia, monitor vital signs, oxygen prn. Focus on energy conservation. eft a Pleurx tube in O2   drain  GERD-Elevate head of bed after meals, monitor stools for blood, lowest effective dose of PPI, consider Tums. Anticoagulation coumadinization for Y-cxa-insiazg pharmacist regarding INR management    Acute kidney failure-Eliminate toxic medications, monitor I's and O's focusing on urine output, recheck BMP. Spinal stenosis of lumbar region with neurogenic claudication    Balance disorder-focus on balance and therapy     Review and simplify inpatient and outpatient medications and dosing times. Transition to self medication program if able. Continue to titrate O2.           Electronically signed by Yared Kaiser DO on 2/27/23 at 8:24 AM WARREN Clemons D.O., PM&R     Attending    [address]

## 2023-03-01 NOTE — PROGRESS NOTES
Occupational Therapy Get up and Go Note            Date: 3/1/2023  Patient Name: Eliana Gayle        MRN: [id number]    Account #: [de-identified]  : 1935  (82 y.o.)      Subjective:  Patient states:  \"I need to go to the bathroom\"  Pain:  Pain at start of treatment: No    Pain at end of treatment: No    Location: N/A  Nursing notified: Denice Banks RN was notified that patient has small loose BM and was continent      Objective:  ADL:  Feeding:  Modified independent - Patient was able to open items on his tray  Grooming:  Patient was supervision to stand and wash his hands at the sink following toileting  Toileting:  Min assist for posterior angelita cleansing after a BM. Patient reported that he is able to complete this at home  Toilet transfers:  SBA for toilet transfer at  level    Call light was answered. Patient was able to complete bed mobility for supine to sit with supervision. Patient removed his CPAP and ambulated to the bathroom with SBA    Treatment consisted of:   [x] ADL Training  [] Strengthening   [] Transfer Training    [] DME Education  [] HEP   [] Patient Education  [] Other:    Safety:  Safety Devices  Safety Devices in place:   Type of devices:  All fall risk precautions in place      Therapy Time:   Individual Group Co-Treat   Time In 0705       Time Out 0720         Minutes 15             ADL/IADL training: 15 minutes         Electronically signed by:    NIAN Marie    3/1/2023, 7:54 AM

## 2023-03-01 NOTE — PROGRESS NOTES
Assessment completed. VSS. Oxygen weaned off. Tolerating RA without difficulty. Denies feeling SOB at rest but does report SOB upon exertion. LBM 3/1. Reported 4/10 generalized, \"all over\" aching type pain. Offered prn tylenol or hemanth. Patient verbalized his request for prn hemanth. Pleurex cath to left side. Dressing remains c/d/I. Orders for M/W/F draining- last drained 2/27 for 275ml of serosang fluid. Continues IV merrem for pna. No confirmed stop date on [facility]- ID following. INR- 2.8. 4mg coumadin given per pharmacy dosing. Cpap on (patient's own home unit.) No distress noted. Call light within reach and bed alarm activated.   Electronically signed by Keyur Penny RN on 3/1/2023 at 3:29 AM

## 2023-03-01 NOTE — PROGRESS NOTES
Physical Therapy Rehab Treatment Note  Facility/Department: [facility]  Room: [facility]804-       NAME: Tamika Wood  : 1935 (82 y.o.)  MRN: [id number]  CODE STATUS: Full Code    Date of Service: 3/1/2023     Restrictions:  Restrictions/Precautions: Fall Risk  Position Activity Restriction  Other position/activity restrictions: pleurx on L side    SUBJECTIVE:   Subjective: \" I slept good last night\"    Pain  Pain: 6/10 L shoulder pre session RN notified    OBJECTIVE:     Roll Left  Assistance Level: Stand by assist  Skilled Clinical Factors: With use of bed rail  Roll Right  Assistance Level: Stand by assist  Skilled Clinical Factors: With use of bed rail  Sit to Supine  Assistance Level: Stand by assist  Skilled Clinical Factors: Increased time to complete  Supine to Sit  Assistance Level: Stand by assist  Skilled Clinical Factors: Increased time and effort  Scooting  Assistance Level: Stand by assist    Sit to Stand  Assistance Level: Stand by assist  Skilled Clinical Factors: Multiple attempts to rise  Stand to Sit  Assistance Level: Supervision  Bed To/From Chair  Technique: Stand step  Assistance Level: Stand by assist  Skilled Clinical Factors: Increased time to complete    Ambulation  Surface: Carpet; Level surface  Device: Rolling walker  Distance: 50' x 2 25' x 2  Activity: Within Unit; Within Room  Activity Comments: Slow jefe increased lateral excursion, decreased heel strike L>R  Assistance Level: Stand by assist;Supervision  Gait Deviations: Slow jefe;Decreased heel strike left  Skilled Clinical Factors: Completed without O2 donned.  Maintains 93%+ with all gait trials    Stairs  Stair Height: 6''  Device: Bilateral handrails  Number of Stairs: 4  Assistance Level: Stand by assist    PT Exercises  A/AROM Exercises: Seated exercises: LAQ's, Hip flexion, Hip abduction, Pillow squeezes, Ankle pumps  x 15     ASSESSMENT/PROGRESS TOWARDS GOALS:   Body Structures, Functions, Activity Limitations Requiring Skilled Therapeutic Intervention: Decreased functional mobility ; Decreased safe awareness;Decreased strength;Decreased endurance;Decreased balance;Decreased posture  Assessment: Patient progressing towards transfer and bed mobility goals at this time completing with SBA. Patient continues to maintain 93%+ SpO2 without O2 donned with all mobility. Assessment  Discharge Recommendations: Continue to assess pending progress    Goals:  Long Term Goals  Long Term Goal 1: indep bed mobility  Long Term Goal 2: Pt will demonstrate transfers supervision with safest AD  Long Term Goal 3: Pt will demonstrate amb >/= 150ft supervision with safest AD  Long Term Goal 4: Pt will demonstrate stair negotiation 3 steps without rails with safest AD SBA  Long Term Goal 5: Pt will demonstrate pantoja balance assessment >/= 45/56 for decreased risk for falls. Patient Goals   Patient Goals : Antonette Heimlich out of chairs easier. drive a car.  stand for longer time. \"    PLAN OF CARE/Safety:   Safety Devices  Type of Devices: All fall risk precautions in place; Chair alarm in place      Therapy Time:   Individual   Time In 0900   Time Out 1000   Minutes 60     Minutes: 60  Transfer/Bed mobility trainin  Gait trainin  Therapeutic ex: [address], PTA, 23 at 12:15 PM

## 2023-03-01 NOTE — PROGRESS NOTES
OCCUPATIONAL THERAPY  INPATIENT REHAB TREATMENT NOTE  [address]      NAME: Abdi Young  : 1935 (82 y.o.)  MRN: [id number]  CODE STATUS: Full Code  Room: I869/R683-04    Date of Service: 3/1/2023    Referring Physician: Dr. Jas Ball Diagnosis: Impaired mobility and ADL's due to severe pulmonary debility    Restrictions  Restrictions/Precautions  Restrictions/Precautions: Fall Risk         Position Activity Restriction  Other position/activity restrictions: pleurx on L side    Patient's date of birth confirmed: Yes    SAFETY:  Safety Devices  Safety Devices in place: Yes  Type of devices: All fall risk precautions in place    SUBJECTIVE:  Subjective: \" I need my brush, its out there in the sink. \"    Pain at start of treatment: Yes: 4/10    Pain at end of treatment: Yes: 4/10    Location: L shoulder  Nursing notified: Declined    COGNITION:    Orientation  Overall Orientation Status: Within Functional Limits  Orientation Level: Oriented X4  Cognition  Overall Cognitive Status: WFL      Pt's current cognitive status is:  Comprehension: Independent  Expression: Independent  Social Interaction: Independent  Problem Solving: Supervision  Memory: Mod I    OBJECTIVE:         Feeding  Assistance Level: Set-up  Grooming/Oral Hygiene  Assistance Level: Supervision  Skilled Clinical Factors: seated  Upper Extremity Bathing  Assistance Level: Moderate assistance  Lower Extremity Bathing  Assistance Level: Moderate assistance  Upper Extremity Dressing  Assistance Level: Minimal assistance  Lower Extremity Dressing  Assistance Level: Moderate assistance  Putting On/Taking Off Footwear  Assistance Level: Maximum assistance  Toileting  Assistance Level: Moderate assistance  Toilet Transfers  Equipment: Standard toilet  Additional Factors: With handrails  Assistance Level: Stand by assist  Tub/Shower Transfers  Type: Shower  Transfer From: Rolling walker  Transfer To:  Shower chair with back  Assistance Level: Stand by assist        Education:  Education  Education Given To: Patient  Education Provided Comments: pursed lip breathing, freq reminders to not breathe so much through his mouth. Education Method: Demonstration;Verbal  Barriers to Learning: None  Education Outcome: Demonstrated understanding;Verbalized understanding;Continued education needed         ASSESSMENT:  Assessment: post showering/toileting pt 02 and WI taken registering around. Pt 02: 45 and WI:38. Provided at 4 L pt raised to 79%-02 and 56 for WI. Pt notes were 2L prn, lowered to 2L and 02 and WI dropped again, then raised 02 back up to 4L with 02- 87 and WI- 56- notifed HÉCTOR Dozier of destats and low WI. Upon departure pt 02 on 4L at 100% and WI-68. HÉCTOR Cook stated to \" leave pt on 4L of 02. \" HÉCTOR Cook stated pt may need pluerx site drained and is waiting on the material to do this. Activity Tolerance: Patient tolerated treatment well      PLAN OF CARE:  Balance training, Functional mobility training, Strengthening, ROM, Endurance training, Safety education & training, Patient/Caregiver education & training, Equipment evaluation, education, & procurement, Home management training, Self-Care / ADL, Coordination training  Continue OT POC until discharge    Patient goals : to get stronger and possibly return to driving  Time Frame for Long Term Goals : within 1.5-2wks pt will demosntrate progress in the following areas to achieve specific LTG's listed in the initial eval  Long Term Goal 1: improve overall strength/endurance for functional tasks.   Long Term Goal 2: improve independence with self care  Long Term Goal 3: improve sitting/standing balance for ADL tasks  Long Term Goal 4: improve functional mobility with LRD for safe item transport/retrieval  Long Term Goal 5: improve FMC to independently manipulate fasteners        Therapy Time:   Individual Group Co-Treat   Time In 1030       Time Out 1200         Minutes 90 ADL/IADL trainin minutes     Electronically signed by:     HERMAN Patino,   3/1/2023, 1:00 PM

## 2023-03-01 NOTE — PROGRESS NOTES
[facility]  Facility/Department: [facility]  Speech Language Pathology   Treatment Note          Josie Escobar  1935  [id number]  [x]   confirmed    Date: 3/1/2023      Restrictions/Precautions: Fall Risk  Position Activity Restriction  Other position/activity restrictions: pleurx on L side    Weight: 261 lb 14.4 oz (118.8 kg)     ADULT DIET; Regular; Low Sodium (2 gm)    SpO2: 100 % (23 0810)  O2 Flow Rate (L/min): 0 L/min (23 0450)  No active isolations    Rehab Diagnosis:      Subjective:  Alert, Cooperative, and Pleasant        Interventions used this date:  Cognitive Skill Development    Objective/Assessment:  Patient progressing towards goals:  Short Term Goals  Time Frame for Short Term Goals: 2 weeks or LOS until goals are met. Goal 1: To increase safety awareness and judgment for safe completion of ADLs secondary to pt's cognitive deficits, pt will complete abstract reasoning tasks (i.e. Word deduction, convergent and divergent naming, similarities/differences) with 80% accuracy and min cues. Patient completed a similarities/differences between two items naming task with the following accuracy:  1 similarity: 100% accuracy I  1 difference: 100% accuracy I  2 similarities: 88% accuracy I, with min cueing 100%  2 differences: 88% accuracy I, with min cueing 100%    Goal 2: To increase safety awareness and judgment for safe completion of ADLs secondary to pt's cognitive deficits,  pt will complete min-mod level problem solving tasks related to ADLs (e.g. medication) with 80% accuracy and min cues. Patient completed an appointment card task with 86% accuracy I. Patient demonstrated understanding of errors made. Goal 4: To decrease pt's cognitive deficits through the use of compensatory strategies, the pt will be educated on 3 different memory strategies and verbalize how he/she might use them at home in 3 ways with  min cues.  Patient unable to identify previously discussed memory strategies. ST re-educated patient on repetition out loud, caregiver repetition, and writing down information. Patient completed a word list retention task with category inclusion using the repetition out loud memory strategy X2 with the following accuracy:  3 word recall: 100% accuracy I  3 word category inclusion: 71% accuracy I, with repetition 100%  4 word recall: 0% I, with repetition 100%  4 word category inclusion: 67% accuracy I, with repetition 100%    Short-term Goals  Timeframe for Short-term Goals: n/a      Treatment/Activity Tolerance:  Patient tolerated treatment well  Patient experiencing difficulty while speaking with verbal tasks secondary to cough. When completing a written task, patient was not demonstrating difficulty. Plan:  Continue per POC  Plan to alternate verbal and written tasks during ST sessions. Pain Assessment:  Patient does not c/o pain. Pain Re-assessment:  Patient does not c/o pain. Patient/Caregiver Education:  Patient educated on session and progression towards goals.     Safety Devices:  Chair alarm in place      Speech Therapy Level of Assistance Scale    AUDITORY COMPREHENSION  Rating:  Supervised Assistance    VERBAL EXPRESSION  Rating:  Supervised Assistance    MOTOR SPEECH  Rating: Independent    PROBLEM SOLVING  Rating: Supervised Assistance-Minimal Assistance    MEMORY  Rating:  Supervised Assistance-Minimal Assistance        Therapy Time  SLP Individual Minutes  Time In: 1000  Time Out: 8943  Minutes: 30            Signature: GEOFF Arteaga

## 2023-03-01 NOTE — CONSULTS
Infectious Disease     Patient Name: Chaz Browne  Date: 2023  YOB: 1935  Medical Record Number: [id number]        No chief complaint on file. History of Present Illness:  Patient is an 44-year-old male who was at [facility] for a possible nerve stimulator placement. CSF leak and the procedure was aborted per MR  Postoperatively patient developed worsening shortness of breath and was brought to [facility] for repeat evaluation he is found to have a large loculated pleural effusion. Patient was started on empiric antibiotic therapy at [facility] ICU and transferred to [facility] for thoracotomy/VATS decortication. Had fluid culture that was positive S epi from pleural effusion. He is s/p Pleurex cath placement. Still short of breath, but off oxygen      The gram stain was no organisms seen. The anaerobic bottle only was the one that turned positive but not until . Nothing was seen on the plate. Likely a skin contaminant.           Review of Systems: All other ROS reviewed and are negative other than as stated in HPI            Social History     Tobacco Use    Smoking status: Former     Packs/day: 0.50     Years: 10.00     Pack years: 5.00     Types: Cigarettes     Quit date: 1968     Years since quittin.9    Smokeless tobacco: Never   Vaping Use    Vaping Use: Never used   Substance Use Topics    Alcohol use: Yes     Comment: social    Drug use: Never         Past Medical History:   Diagnosis Date    A-fib ([facility] [address]     approx 1 year ago-cardioverted    Arthritis     Baker's cyst of knee, left     Cancer ([facility] [address]     mesothelioma - radiation treatments    Cerebral artery occlusion with cerebral infarction ([facility] [address]     TIA sx felt funny --     Congestive heart failure ([facility] [address] 2022    Coronary artery disease     HTN (hypertension)     meds > 20 yrs    Hx of blood clots     DVT left leg     Hyperlipidemia     meds > 20 yrs    Pacemaker 2022    Polycythemia Torn meniscus     left knee           Past Surgical History:   Procedure Laterality Date    BACK SURGERY      COLONOSCOPY  2009    COLONOSCOPY  2012    CORONARY ANGIOPLASTY WITH STENT PLACEMENT  10/2006    x 1 cardiac stent    ENDOSCOPY, COLON, DIAGNOSTIC      EYE SURGERY      [place] with IOL OU    HERNIA REPAIR  03/2013    redo ca mesh in [id number] -- umbilical hernia    JOINT REPLACEMENT Bilateral 2009 & 2011    Bilateral TKR    KNEE SURGERY Left      arthroscopic surgery to repair meniscus of left knee    LAMINECTOMY N/A 02/08/2021    RIGHT BILATERAL L2-3 3-4 4-5 MICRODECOMPRESSION performed by Radha Nieto MD at [facility] 7/19/22    PAIN MANAGEMENT PROCEDURE N/A 2/16/2023    ATTEMPTED SPINAL CORD STIMULATOR PERMANENT PLACEMENT performed by Humble Luis MD at [address] N/A 1/10/2023    SPINAL CORD STIMULATOR TRIAL performed by Humble Luis MD at [place]  age 8    [address]  03/2012    UPPER GASTROINTESTINAL ENDOSCOPY N/A 2/21/2023    EGD DIAGNOSTIC ONLY performed by Buddy Diaz MD at [facility]         Current Facility-Administered Medications on File Prior to Encounter   Medication Dose Route Frequency Provider Last Rate Last Admin    sodium chloride flush 0.9 % injection 10 mL  10 mL IntraVENous PRN Heide Jeong MD   10 mL at 07/10/19 1220     Current Outpatient Medications on File Prior to Encounter   Medication Sig Dispense Refill    carboxymethylcellulose (THERATEARS) 1 % ophthalmic gel Apply to eye      melatonin 5 MG TABS tablet Take by mouth      enoxaparin (LOVENOX) 100 MG/ML Inject into the skin 2 times daily      metoprolol tartrate (LOPRESSOR) 50 MG tablet Take 25 mg by mouth 2 times daily      Elastic Bandages & Supports (MEDICAL COMPRESSION STOCKINGS) MISC 1 each by Does not apply route daily Thigh high 20-30 mmhg graduated compression stockings both legs wear daily during day and off Qhs. Wear as tolerated. Do not wear if they cause increased pain. 1 each 5    losartan (COZAAR) 100 MG tablet Take 100 mg by mouth in the morning. potassium chloride (KLOR-CON M) 20 MEQ extended release tablet Take 20 mEq by mouth in the morning and 20 mEq before bedtime. rosuvastatin (CRESTOR) 10 MG tablet TAKE 1 TABLET BY MOUTH EVERY DAY      furosemide (LASIX) 20 MG tablet Take 20 mg by mouth daily      warfarin (COUMADIN) 2.5 MG tablet Take 4 mg by mouth daily Indications: --Sat - Sun Daily per INR levels      nitroGLYCERIN (NITROSTAT) 0.4 MG SL tablet Place 0.4 mg under the tongue every 5 minutes as needed for Chest pain up to max of 3 total doses. If no relief after 1 dose, call 911. Omega-3 Fatty Acids (FISH OIL) 1200 MG CAPS Take by mouth daily      Cholecalciferol (VITAMIN D3) 125 MCG (5000 UT) TABS Take by mouth daily      CPAP Machine MISC by Does not apply route      warfarin (COUMADIN) 5 MG tablet Take 5 mg by mouth daily Indications: Ograts-Cwkifukfz-Pyjsgf Daily per INR levels      aspirin 81 MG EC tablet Take 81 mg by mouth daily. Allergies   Allergen Reactions    Benadryl [Diphenhydramine Hcl] Anxiety    Diphenhydramine Anxiety     anxious         Family History   Problem Relation Age of Onset    Heart Attack Mother     Other Mother          in MVA    Other Father          at age 80    Other Sister          as infant    Cancer Brother     Other Brother         unknown medical hx    Other Brother          at age 61 due to accident    Cancer Daughter         colon & lung cancer    Colon Cancer Daughter     No Known Problems Son     Colon Cancer Other     Breast Cancer Other          Physical Exam:     Blood pressure (!) 148/66, pulse 77, temperature 98.2 °F (36.8 °C), resp. rate 20, height 5' 7\" (1.702 m), weight 261 lb 14.4 oz (118.8 kg), SpO2 99 %. General: Patient appears ok at the present time.  NAD  Skin: no new rashes  HEENT:  Neck is supple, No subconjunctival hemorrhages, no oral exudates  Heart: S1 S2  Lungs:diminshed bases  Abdomen: soft, ND, NTTP,   Back :no CVA tenderness  Extrem: edema, non tender  Neuro exam: CN II-XII intact  Psych: cooperative    Labs: I have reviewed all lab results by electronic record, including most recent CBC, metabolic panel, and pertinent abnormalities were addressed from an infectious disease perspective. Trends are being monitored over time. Lab Results   Component Value Date    WBC 8.5 02/26/2023    HGB 7.2 (L) 02/26/2023    HCT 21.8 (L) 02/26/2023    MCV 87.4 02/26/2023     02/26/2023     Lab Results   Component Value Date/Time     02/26/2023 04:49 AM    K 3.9 02/26/2023 04:49 AM    K 3.3 02/25/2023 05:16 AM     02/26/2023 04:49 AM    CO2 22 02/26/2023 04:49 AM    BUN 20 02/26/2023 04:49 AM    CREATININE 1.25 02/26/2023 04:49 AM    GLUCOSE 109 02/26/2023 04:49 AM    GLUCOSE 85 04/25/2012 01:30 PM    CALCIUM 8.1 02/26/2023 04:49 AM        Radiology:  I have reviewed imaging results per electronic record and most pertinent abnormalities are being addressed from an infectious disease standpoint. 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   Pneumonia    Coag negative staph after the Pleurx was placed could be contaminant was not reportedly consistent with empyema      PLAN:  Repeat culture of effusion was taken  Continue current antibiotics for now

## 2023-03-01 NOTE — FLOWSHEET NOTE
Perfect serve to Dr. Yanelis Chandler regarding the orders to drain the plueral cath. Please give orders regarding the plueral cath. and how frequently you want it drained. pt. is noted with increased cough today. pt. is using accesssory muscles to breath. pt. oxygen level has improved to  % on room air. we will do a covid test as well. Pt. Sandy Sa on isolation until covid test is confirmed negative. Covid test negative. New order to drain pleural cath Monday, Wednesday and Friday an as needed for SOB. Pleural cath drained 250ml of jim flluid, pt. Tolerated the procedure without complaints of pain. Pt. Wife will be having a procedure tomorrow  Kidney stent , and will be unavailable. Pt. Daughter is in town for the day. Her number is.    Inge Oliva  [phone]

## 2023-03-02 LAB
INR BLD: 3.4
PROTHROMBIN TIME: 34.6 SEC (ref 12.3–14.9)

## 2023-03-02 PROCEDURE — 2700000000 HC OXYGEN THERAPY PER DAY

## 2023-03-02 PROCEDURE — 97130 THER IVNTJ EA ADDL 15 MIN: CPT

## 2023-03-02 PROCEDURE — 36415 COLL VENOUS BLD VENIPUNCTURE: CPT

## 2023-03-02 PROCEDURE — 97112 NEUROMUSCULAR REEDUCATION: CPT

## 2023-03-02 PROCEDURE — 97535 SELF CARE MNGMENT TRAINING: CPT

## 2023-03-02 PROCEDURE — 97116 GAIT TRAINING THERAPY: CPT

## 2023-03-02 PROCEDURE — 6370000000 HC RX 637 (ALT 250 FOR IP): Performed by: PHYSICAL MEDICINE & REHABILITATION

## 2023-03-02 PROCEDURE — 85610 PROTHROMBIN TIME: CPT

## 2023-03-02 PROCEDURE — 1180000000 HC REHAB R&B

## 2023-03-02 PROCEDURE — 97110 THERAPEUTIC EXERCISES: CPT

## 2023-03-02 PROCEDURE — 99232 SBSQ HOSP IP/OBS MODERATE 35: CPT | Performed by: PHYSICAL MEDICINE & REHABILITATION

## 2023-03-02 PROCEDURE — 6370000000 HC RX 637 (ALT 250 FOR IP): Performed by: FAMILY MEDICINE

## 2023-03-02 PROCEDURE — 97129 THER IVNTJ 1ST 15 MIN: CPT

## 2023-03-02 PROCEDURE — 97530 THERAPEUTIC ACTIVITIES: CPT

## 2023-03-02 RX ADMIN — FUROSEMIDE 20 MG: 20 TABLET ORAL at 09:04

## 2023-03-02 RX ADMIN — Medication 100 MG: at 09:01

## 2023-03-02 RX ADMIN — ROSUVASTATIN CALCIUM 10 MG: 5 TABLET, FILM COATED ORAL at 09:01

## 2023-03-02 RX ADMIN — GUAIFENESIN 600 MG: 600 TABLET ORAL at 09:01

## 2023-03-02 RX ADMIN — Medication 5 MG: at 21:35

## 2023-03-02 RX ADMIN — ASPIRIN 81 MG: 81 TABLET, COATED ORAL at 09:01

## 2023-03-02 RX ADMIN — METOPROLOL TARTRATE 25 MG: 25 TABLET, FILM COATED ORAL at 09:01

## 2023-03-02 RX ADMIN — NALOXEGOL OXALATE 25 MG: 12.5 TABLET, FILM COATED ORAL at 09:01

## 2023-03-02 RX ADMIN — METOPROLOL TARTRATE 25 MG: 25 TABLET, FILM COATED ORAL at 21:35

## 2023-03-02 RX ADMIN — Medication 2000 UNITS: at 17:01

## 2023-03-02 RX ADMIN — GUAIFENESIN 600 MG: 600 TABLET ORAL at 21:35

## 2023-03-02 RX ADMIN — OXYCODONE 10 MG: 5 TABLET ORAL at 14:03

## 2023-03-02 ASSESSMENT — PAIN DESCRIPTION - DESCRIPTORS
DESCRIPTORS: SHARP;THROBBING
DESCRIPTORS: ACHING

## 2023-03-02 ASSESSMENT — PAIN DESCRIPTION - LOCATION
LOCATION: SHOULDER

## 2023-03-02 ASSESSMENT — PAIN SCALES - GENERAL
PAINLEVEL_OUTOF10: 0
PAINLEVEL_OUTOF10: 4
PAINLEVEL_OUTOF10: 5
PAINLEVEL_OUTOF10: 6

## 2023-03-02 ASSESSMENT — PAIN DESCRIPTION - ORIENTATION
ORIENTATION: LEFT

## 2023-03-02 NOTE — PROGRESS NOTES
OCCUPATIONAL THERAPY  INPATIENT REHAB TREATMENT NOTE  [address]      NAME: Sasha Atkinson  : 1935 (82 y.o.)  MRN: [id number]  CODE STATUS: Full Code  Room: Q133/W892-23    Date of Service: 3/2/2023    Referring Physician: Dr. Piper Negrete Diagnosis: Impaired mobility and ADL's due to severe pulmonary debility    Restrictions  Restrictions/Precautions  Restrictions/Precautions: Fall Risk         Position Activity Restriction  Other position/activity restrictions: pleurx on L side    Patient's date of birth confirmed: Yes    SAFETY:  Safety Devices  Safety Devices in place: Yes  Type of devices: All fall risk precautions in place    SUBJECTIVE:  Subjective: \" I like liver. \"    Pain at start of treatment: Yes: 4/10    Pain at end of treatment: Yes: 4/10    Location: L shoulder  Nursing notified: No  Intervention: Other: pain meds provided and discussed positioning of arms when laying in bed to see if this helps with pain    COGNITION:  Orientation  Overall Orientation Status: Within Functional Limits  Orientation Level: Oriented X4  Cognition  Overall Cognitive Status: WFL          OBJECTIVE:    Pt completed a variety of fine/gross motor skill activity to increase functional reaching, grasp, manipulation of LUE and increase functional act tolerance and overall BUE strength to continue improving ease and Ind with ADL/IADL tasks. Pt completed clothes pin and bucket activity seated at table top height with Sup. Pt donned all clothes pins around top of bucket with RUE and doffed all of them with LUE. Then pt initiated own activity on table top connecting clothes pins in his own way using LUE. Pt demo min difficulty utilizing LUE fine motor skills with pinching d/t prior CVA effecting L side and leaving hand with decreased strength and sensation. Initiated own activity connecting clothes pins    Pt completed US wooden puzzle seated with Sup at table top height.   Provided pieces of puzzle on each side of pt. States were placed back into puzzle format with each hand occording to each side of the puzzle. ( R hand placed R side states and L hand placed left side states.)  Pt demo min difficulty with manipulating pieces in L hand but was able to complete task. Pt completed task with accuracy and good pacing. Pt completed standing golf kameron activity with SBA for ininital stand x 5:20 to don golf tees. Pt required rest break d/t shortness of breath. Pt then stood again providing Sup x 1:27 to doff golf tees. Pt uses RUE for R side of board and LUE for L side of board. Pt demo min difficulty with fine motor skills on L hand during attempts to  golf tees from box to place onto board. Pt had to try manipulate kameron a couple times before getting good grasp on it to place it. Pt completed task to improve fine/gross motor coordination/manipulation, standing tolerance, functional transfers to increase ease and Ind with ADL tasks. ASSESSMENT:  Assessment: Pt doing well this day. cooperative and in joking mood. Activity Tolerance: Patient tolerated treatment well      PLAN OF CARE:  Balance training, Functional mobility training, Strengthening, ROM, Endurance training, Safety education & training, Patient/Caregiver education & training, Equipment evaluation, education, & procurement, Home management training, Self-Care / ADL, Coordination training  Continue OT POC until discharge    Patient goals : to get stronger and possibly return to driving  Time Frame for Long Term Goals : within 1.5-2wks pt will demosntrate progress in the following areas to achieve specific LTG's listed in the initial eval  Long Term Goal 1: improve overall strength/endurance for functional tasks.   Long Term Goal 2: improve independence with self care  Long Term Goal 3: improve sitting/standing balance for ADL tasks  Long Term Goal 4: improve functional mobility with LRD for safe item transport/retrieval  Long Term Goal 5: improve [facility] to independently manipulate fasteners        Therapy Time:   Individual Group Co-Treat   Time In 1430       Time Out 1530         Minutes 60               Neuromuscular reeducation: 30 minutes  Therapeutic activities: 30 minutes     Electronically signed by:    HERMAN Day,   3/2/2023, 3:25 PM

## 2023-03-02 NOTE — PLAN OF CARE
Problem: Discharge Planning  Goal: Discharge to home or other facility with appropriate resources  3/2/2023 1017 by Juan Man RN  Outcome: Progressing  Flowsheets (Taken 3/2/2023 4079)  Discharge to home or other facility with appropriate resources: Identify barriers to discharge with patient and caregiver  3/2/2023 0004 by Vicenta Steiner RN  Outcome: Progressing     Problem: Pain  Goal: Verbalizes/displays adequate comfort level or baseline comfort level  3/2/2023 1017 by Juan Man RN  Outcome: Progressing  3/2/2023 0004 by Vicenta Steiner RN  Outcome: Progressing  Flowsheets (Taken 3/1/2023 2110)  Verbalizes/displays adequate comfort level or baseline comfort level: Encourage patient to monitor pain and request assistance     Problem: Safety - Adult  Goal: Free from fall injury  3/2/2023 1017 by Juan Man RN  Outcome: Progressing  3/2/2023 0004 by Vicenta Steiner RN  Outcome: Progressing     Problem: ABCDS Injury Assessment  Goal: Absence of physical injury  3/2/2023 1017 by Juan Man RN  Outcome: Progressing  3/2/2023 0004 by Vicenta Steiner RN  Outcome: Progressing     Problem: Chronic Conditions and Co-morbidities  Goal: Patient's chronic conditions and co-morbidity symptoms are monitored and maintained or improved  3/2/2023 1017 by Juan Man RN  Outcome: Progressing  Flowsheets (Taken 3/2/2023 0913)  Care Plan - Patient's Chronic Conditions and Co-Morbidity Symptoms are Monitored and Maintained or Improved: Monitor and assess patient's chronic conditions and comorbid symptoms for stability, deterioration, or improvement  3/2/2023 0004 by Vicenta Steiner RN  Outcome: Progressing     Problem: Skin/Tissue Integrity  Goal: Absence of new skin breakdown  Description: 1. Monitor for areas of redness and/or skin breakdown  2. Assess vascular access sites hourly  3. Every 4-6 hours minimum:  Change oxygen saturation probe site  4.   Every 4-6 hours:  If on nasal continuous positive airway pressure, respiratory therapy assess nares and determine need for appliance change or resting period.   3/2/2023 1017 by Sangeeta Arellano RN  Outcome: Progressing  3/2/2023 0004 by Ray Gray, RN  Outcome: Progressing

## 2023-03-02 NOTE — PROGRESS NOTES
OCCUPATIONAL THERAPY  INPATIENT REHAB TREATMENT NOTE  [address]      NAME: Jered Whitlock  : 1935 (82 y.o.)  MRN: [id number]  CODE STATUS: Full Code  Room: C956/R083-78    Date of Service: 3/2/2023    Referring Physician: Dr. Elkin Fuentes Diagnosis: Impaired mobility and ADL's due to severe pulmonary debility    Restrictions  Restrictions/Precautions  Restrictions/Precautions: Fall Risk         Position Activity Restriction  Other position/activity restrictions: pleurx on L side    Patient's date of birth confirmed: Yes    SAFETY:  Safety Devices  Safety Devices in place: Yes  Type of devices: All fall risk precautions in place    SUBJECTIVE:  Subjective: \" I can get it to about here. \"    Pain at start/end of treatment: Yes: 4/10 with movement and 0/10 resting without movement  Location: L biceps area through anterior area of shoulder  Nursing notified: No  Intervention: Other: Nursing aware    COGNITION:  Orientation  Overall Orientation Status: Within Functional Limits  Orientation Level: Oriented X4  Cognition  Overall Cognitive Status: WFL    OBJECTIVE:     Patient engaged in [address] [place] [address] dynamic reaching peg board/peg task to increase independence with ADL's, IADL's and transfers. Patient able to  pegs with L hand and place mushroom pegs into the pegboard using R hand. Patient able to reach about 1/2 way up on peg board to take pegs out with L hand with mod difficulty d/t shoulder impaired ROM accompanied by pain, and completed doffing pegs from top of board 1/2 way down with R hand without difficulty  This task was completed task to improve functional movement/use/ROM with LUE and increase functional act tolerance to increase functional ADL tasks. ASSESSMENT:  Assessment: Pt doing well this day. cooperative and in joking mood.   Activity Tolerance: Patient tolerated treatment well      PLAN OF CARE:  Balance training, Functional mobility training, Strengthening, ROM, Endurance training, Safety education & training, Patient/Caregiver education & training, Equipment evaluation, education, & procurement, Home management training, Self-Care / ADL, Coordination training  Continue OT POC until discharge    Patient goals : to get stronger and possibly return to driving  Time Frame for Long Term Goals : within 1.5-2wks pt will demosntrate progress in the following areas to achieve specific LTG's listed in the initial eval  Long Term Goal 1: improve overall strength/endurance for functional tasks. Long Term Goal 2: improve independence with self care  Long Term Goal 3: improve sitting/standing balance for ADL tasks  Long Term Goal 4: improve functional mobility with LRD for safe item transport/retrieval  Long Term Goal 5: improve FMC to independently manipulate fasteners        Therapy Time:   Individual Group Co-Treat   Time In 1130       Time Out 1200         Minutes 30               Neuromuscular reeducation: 15 minutes  Therapeutic activities: 15 minutes     Electronically signed by:     HERMAN Sauceda,   3/2/2023, 11:54 AM

## 2023-03-02 NOTE — PROGRESS NOTES
Physical Therapy Rehab Treatment Note  Facility/Department: [facility]  Room: A922/S023-11       NAME: Ritesh Pérez  : 1935 (82 y.o.)  MRN: [id number]  CODE STATUS: Full Code    Date of Service: 3/2/2023     Restrictions:  Restrictions/Precautions: Fall Risk  Position Activity Restriction  Other position/activity restrictions: pleurx on L side    SUBJECTIVE:   Subjective: Patient agreeable to pm session. Patient finishing up with nursing for rr care upon PTA arrival.    Pain  Pain: 5/10 left shoulder pain, states he has patches on for pain control, RN aware  Rn notified after session for pain med request    OBJECTIVE:        Roll Left  Assistance Level: Stand by assist  Skilled Clinical Factors: With use of bed rail  Roll Right  Assistance Level: Stand by assist  Skilled Clinical Factors:  With use of bed rail  Sit to Supine  Assistance Level: Stand by assist  Skilled Clinical Factors: Increased time to complete  Supine to Sit  Assistance Level: Stand by assist  Skilled Clinical Factors: Increased time and effort  Scooting  Assistance Level: Stand by assist    Sit to Stand  Assistance Level: Stand by assist;Supervision  Skilled Clinical Factors: Multiple attempts to rise  Stand to Sit  Assistance Level: Supervision  Bed To/From Chair  Technique: Stand step  Assistance Level: Stand by assist  Skilled Clinical Factors: Increased time to complete requires use of UE's to complete    Ambulation  Surface: Level tile  Device: Rolling Walker  Other Apparatus: O2  Assistance: Contact guard assistance;Stand by assistance  Quality of Gait: cues for posture and upward gaze, decreased step length and height  Distance: 100'    Stairs/Curb  Stairs?: No       PT Exercises  Exercise Treatment: seated lat engagement in chair [id number] hold  Resistive Exercises: seated hip ext, hc,qs,abd,add RTB x20 for ble strength  Postural Correction Exercises: pec stretch x30 sec seated with overpressure for full range, seated shrugs, retraction x20        ASSESSMENT/PROGRESS TOWARDS GOALS:   Assessment  Assessment: Patient challenged with ble strengthening exercises. Repeated sit to stand x10 for quality and strength with patient demonstrating improved car tf after. Patient    Goals:  Long Term Goals  Long Term Goal 1: indep bed mobility  Long Term Goal 2: Pt will demonstrate transfers supervision with safest AD  Long Term Goal 3: Pt will demonstrate amb >/= 150ft supervision with safest AD  Long Term Goal 4: Pt will demonstrate stair negotiation 3 steps without rails with safest AD SBA  Long Term Goal 5: Pt will demonstrate pantoja balance assessment >/= 45/56 for decreased risk for falls. Patient Goals   Patient Goals : Rosemary Millin out of chairs easier. drive a car.  stand for longer time. \"    PLAN OF CARE/Safety:   Safety Devices  Type of Devices: All fall risk precautions in place; Chair alarm in place      Therapy Time:   Individual   Time In 1255   Time Out 1355   Minutes 60     Minutes:60  Transfer/Bed mobility trainin  Gait trainin  Neuro re education:0  Therapeutic ex:20      Pillo Gilmore PTA, 23 at 2:09 PM

## 2023-03-02 NOTE — PROGRESS NOTES
Occupational Therapy Get up and Go Note            Date: 3/2/2023  Patient Name: Bhavik Dietrich        MRN: [id number]    Account #: [de-identified]  : 1935  (82 y.o.)      Subjective:  Patient states:  \"I need my teeth. I put them on the tray last night. In a cup. \"  Pain:  Pain at start of treatment: No    Pain at end of treatment: No    Objective:  ADL:  Feeding:  Setup  Grooming:  Setup  Bed Mobility:  Dep x 2 to scoot to head of bed        Treatment consisted of:   [x] ADL Training  [] Strengthening   [] Transfer Training    [] DME Education  [] HEP   [] Patient Education  [] Other:    Safety:  Safety Devices  Safety Devices in place:   Type of devices:  All fall risk precautions in place      Therapy Time:   Individual Group Co-Treat   Time In 0711       Time Out 0720         Minutes 9             ADL/IADL trainin minutes         Electronically signed by:    HERMAN Ramos    3/2/2023, 8:24 AM

## 2023-03-02 NOTE — PROGRESS NOTES
Pt assessment completed. Pt denies any pain. Pt used W/W from chair to bed. Slow walk CGA from sit to stand and SBA with W/W to bed. Pt denies SOB but is visably- 2L NC applied. Last BM 3/1. Pleurx cath dressing D&I L Side. Pt VSS. INR , Yesterday 3/1 @ 3.2 and was held per pharmacy dosing. CPAP on (pt's own from home). Call light within reach and bed alarm activated. Pt uses call light appropriately.

## 2023-03-02 NOTE — PROGRESS NOTES
Warfarin Dosing - Pharmacy Consult Note  Consulting Provider: EDELMIRA Zuleta CNP    Indication:  Atrial Fibrillation  Warfarin Dose prior to admission: 4 mg T/TH/Sat/Sun, 5 mg M/W/F   Concurrent anticoagulants/antiplatelets: aspirin 81 mg  Significant Drug Interactions: No obvious interactions  Recent Labs     02/27/23  0501 02/28/23  0452 03/01/23  0542   INR 2.9 2.8 3.2     Recent warfarin administrations                     warfarin (COUMADIN) tablet 4 mg (mg) 4 mg Given 02/28/23 1721    warfarin (COUMADIN) tablet 2.5 mg (mg) 2.5 mg Given 02/27/23 1738    warfarin (COUMADIN) tablet 2 mg (mg) 2 mg Given 02/26/23 1643                   Date   INR    Dose  2/24       1.6        5 mg   2/25       1.9        4 mg   2/26       2.5        2 mg  2/27       2.9        2.5 mg   2/28       2.8         4 mg  3/1/        3.2        HOLD    Assessment/Plan  (Goal INR: 2 - 3)  INR supratherapeutic today at 3.2, will hold dose today    Active problem list reviewed. INR orders are placed. Chart reviewed for pertinent labs, drug/diet interactions, and past doses. Documentation of patient's clinical condition was reviewed. Pharmacy Dosing:  Pharmacy will continue to follow.      Rose Mcdonough PharmD   3/1/2023 10:17 AM

## 2023-03-02 NOTE — PROGRESS NOTES
Occupational Therapy Get up and Go Note            Date: 3/2/2023  Patient Name: Tamika Wood        MRN: [id number]    Account #: [de-identified]  : 1935  (82 y.o.)      Subjective:  Patient states:  I'll get back in bed  Pain:  Pain at start of treatment: No    Pain at end of treatment: No    Objective:  ADL:  Grooming:  Supervision in standing  Toileting:  MOD A BM hygiene  Toilet transfers:  Supervision        Treatment consisted of:   [x] ADL Training  [] Strengthening   [] Transfer Training    [] DME Education  [] HEP   [] Patient Education  [] Other:    Safety:  Safety Devices  Safety Devices in place:   Type of devices:  All fall risk precautions in place      Therapy Time:   Individual Group Co-Treat   Time In 0743       Time Out 0753         Minutes 10             ADL/IADL training: 10 minutes         Electronically signed by:    HERMAN Smith    3/2/2023, 8:46 AM

## 2023-03-02 NOTE — PROGRESS NOTES
Warfarin Dosing - Pharmacy Consult Note  Consulting Provider: EDELMIRA Domingo CNP    Indication:  Atrial Fibrillation  Warfarin Dose prior to admission: 4 mg T/TH/Sat/Sun, 5 mg M/W/F   Concurrent anticoagulants/antiplatelets: aspirin 81 mg  Significant Drug Interactions: No obvious interactions  Recent Labs     02/28/23  0452 03/01/23  0542 03/02/23  0503   INR 2.8 3.2 3.4     Recent warfarin administrations                     warfarin (COUMADIN) tablet 4 mg (mg) 4 mg Given 02/28/23 1721    warfarin (COUMADIN) tablet 2.5 mg (mg) 2.5 mg Given 02/27/23 1738                   Date   INR    Dose  2/24       1.6        5 mg   2/25       1.9        4 mg   2/26       2.5        2 mg  2/27       2.9        2.5 mg   2/28       2.8         4 mg  3/1         3.2        HOLD  3/2         3.4        HOLD    Assessment/Plan  (Goal INR: 2 - 3)  INR supratherapeutic today at 3.4  Will hold warfarin again today    Active problem list reviewed. INR orders are placed. Chart reviewed for pertinent labs, drug/diet interactions, and past doses. Documentation of patient's clinical condition was reviewed. Pharmacy Dosing:  Pharmacy will continue to follow.      Marianne Gamboa, Henri  3/2/2023 11:52 AM

## 2023-03-02 NOTE — PROGRESS NOTES
Subjective: The patient complains of moderate to severe acute on chronic progressive fatigue and  PRIETO partially relieved by rest, medications, PT,  OT,   SLP and rest and exacerbated by recent illness  . Patient had initially been treated at [facility] [place] where a nerve stimulator placement was attempted but delayed due to high INR. Once the procedure was finally initiated it was aborted because of possible CSF leak. Patient became shortness of breath postprocedure but this was found to be largely unrelated to the procedure but related to a large loculated pleural effusion. Patient was transferred to [facility] for thoracotomy and VATS procedure performed by Dr. Nancy Barillas. As noted patient had a chest tube inserted by Dr. Nancy Barillas on 2/18/2023. He has been followed by thoracic surgery pulmonology oncology GI and cardiology as well as the hospitalist.     From a thoracic surgery standpoint cultures were sent from his pleural effusion which were positive and he is now on IV antibiotics. The left a Pleurx tube in O2   drain 3 times a week. The plan is for outpatient removal of the drain and if any worsening symptoms occur after the Pleurx is removed possible VATS surgery. They are trying to avoid surgery for now. Pulmonology followed him advising titrating O2 to keep sats above 90 continue home CPAP and continue draining the Pleurx daily. 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 He they noted his symptoms were improving and he has a history of following with Dr. Garett Segovia at [facility] in [facility] regarding his mesothelioma. Discussed options regarding possible chemotherapy palliative care intermittent drainage of the Pleurx catheter and possible hospice care. Currently they are advising treating anemia with IV Venofer here.   Cardiology was consulted and obtain an echocardiogram to assess the pericardial effusion    I am concerned about patients medical complexities and barriers to advancing in rehab goals including  improved pain control. I reviewed current care and plans for further care with other rehab providers including nursing and case management. According to recent nursing note, \"  Pt denies any pain. Pt used W/W from chair to bed. Slow walk CGA from sit to stand and SBA with W/W to bed. Pt denies SOB but is visably- 2L NC applied. Last BM 3/1. Pleurx cath dressing D&I L Side. Pt VSS. INR , Yesterday 3/1 @ 3.2 and was held per pharmacy dosing. CPAP on (pt's own from home). Call light within reach and bed alarm activated. Pt uses call light appropriately\". He is still doing well on low-dose 2 L occasionally being weaned to room air but continues to need CPAP. ROS x10: The patient also complains of severely impaired mobility and activities of daily living. Otherwise no new problems with vision, hearing, nose, mouth, throat, dermal, cardiovascular, GI, , pulmonary, musculoskeletal, psychiatric or neurological. See also Acute Rehab PM&R H&P. Vital signs:  BP (!) 116/55   Pulse 76   Temp 98.6 °F (37 °C) (Oral)   Resp 16   Ht 5' 7\" (1.702 m)   Wt 256 lb 3.2 oz (116.2 kg)   SpO2 96%   BMI 40.13 kg/m²   I/O:   PO/Intake:  fair PO intake,   Reg diet    Bowel:   continent LBM 3/1  Bladder: continent    bishop  General:  Patient is well developed,   adequately nourished, and    well kempt. HEENT:    Pupils equal, hearing intact to loud voice, external inspection of ear and nose benign. Inspection of lips, tongue and gums  thrush    Musculoskeletal: No significant change in strength or tone. All joints stable. Inspection and palpation of digits and nails show no clubbing, cyanosis or inflammatory conditions. Neuro/Psychiatric: Affect: flat but pleasant. Alert and oriented to person, place and situation with  min cues. No significant change in deep tendon reflexes or sensation  Lungs:  Diminished, CTA-B.  Respiration effort is   normal at rest.   Pleurx cath dressing D&I L Side. Heart:   S1 = S2,   RRR. Abdomen:  Soft, non-tender, no enlargement of liver or spleen. Extremities:   mod lower extremity edema    Skin:   Intact to general survey,  Pleurx cath dressing D&I L Side. Rehabilitation:  Physical Therapy:   Bed mobility:  Bed mobility  Rolling to Left: Minimal assistance (02/26/23 1242)  Rolling to Right: Minimal assistance (02/26/23 1242)  Supine to Sit: Moderate assistance (02/26/23 1242)  Sit to Supine: Moderate assistance (02/26/23 1242)  Bed Mobility Comments: HOB flat; cues to avoid breath holding (02/26/23 1242)  Roll Left  Assistance Level: Stand by assist (03/01/23 8305)  Skilled Clinical Factors: With use of bed rail (03/01/23 0929)  Roll Right  Assistance Level: Stand by assist (03/01/23 8537)  Skilled Clinical Factors: With use of bed rail (03/01/23 0929)  Sit to Supine  Assistance Level: Stand by assist (03/01/23 0929)  Skilled Clinical Factors: Increased time to complete (03/01/23 0929)  Supine to Sit  Assistance Level: Stand by assist (03/01/23 0929)  Skilled Clinical Factors: Increased time and effort (03/01/23 0929)  Scooting  Assistance Level: Stand by assist (03/01/23 6983)  Transfers:  Transfers  Sit to Stand:  Moderate Assistance (02/26/23 1243)  Stand to Sit: Moderate Assistance (02/26/23 1243)  Comment: poor use of L LE during sit to stand; [employer] used (02/26/23 1243)  Sit to Stand  Assistance Level: Stand by assist (03/01/23 0929)  Skilled Clinical Factors: Multiple attempts to rise (03/01/23 0929)  Stand to Sit  Assistance Level: Supervision (03/01/23 0929)  Skilled Clinical Factors: Improved hand placement eccentric control (02/28/23 1411)  Bed To/From Chair  Technique: Stand step (03/01/23 0716)  Assistance Level: Stand by assist (03/01/23 1422)  Skilled Clinical Factors: Increased time to complete (03/01/23 0929)  Car Transfer  Assistance Level: Minimal assistance (02/27/23 0942)  Skilled Clinical Factors: [place] to rise from low level chair able to lift BLE in and out of car (02/27/23 4423)  Gait:   Ambulation  Surface: Level tile (02/26/23 1244)  Device: Rolling Walker (02/26/23 1244)  Other Apparatus: O2 (02/26/23 1244)  Assistance: Contact guard assistance (02/26/23 1244)  Quality of Gait: forward flexed trunk, heavy UE use and support, SOB , maintains unsafe distance inside AD (02/26/23 1244)  Gait Deviations: Slow Jefe; Increased MENA; Decreased step length;Decreased step height (02/26/23 1244)  Distance: 35ft (02/26/23 1244)  Ambulation  Surface: Carpet; Level surface (03/01/23 0955)  Device: Rolling walker (03/01/23 0955)  Distance: 50' x 2 25' x 2 (03/01/23 6162)  Activity: Within Unit; Within Room (03/01/23 4403)  Activity Comments: Slow jefe increased lateral excursion, decreased heel strike L>R (03/01/23 0955)  Additional Factors: Verbal cues (02/28/23 0951)  Assistance Level: Stand by assist;Supervision (03/01/23 0955)  Gait Deviations: Slow jefe;Decreased heel strike left (03/01/23 0955)  Skilled Clinical Factors: Completed without O2 donned. Maintains 93%+ with all gait trials (03/01/23 0955)  Stairs:  Stairs/Curb  Stairs?: No (02/26/23 1244)  Stairs  Stair Height: 6'' (03/01/23 0955)  Device: Bilateral handrails (03/01/23 0955)  Number of Stairs: 4 (03/01/23 0955)  Additional Factors: Verbal cues; Non-reciprocal going down;Reciprocal going up (02/28/23 0951)  Assistance Level: Stand by assist (03/01/23 0955)  Skilled Clinical Factors: Completed with 2L 93% post stair negotiation improved 2 96% with seated rest break (02/28/23 0951)  W/C mobility:       Occupational Therapy:   Hand Dominance: Right  ADL  Feeding: Stand by assistance (02/26/23 1201)  Grooming: Minimal assistance (02/26/23 1201)  UE Bathing: Minimal assistance (02/26/23 1201)  LE Bathing: Maximum assistance (02/26/23 1201)  LE Bathing Skilled Clinical Factors: periare only per pt request (02/26/23 1201)  UE Dressing: Moderate assistance (02/26/23 1201)  LE Dressing: Maximum assistance (02/26/23 1201)  Toileting: Maximum assistance (02/26/23 1201)  Additional Comments: sponge bath ADL completed sitting EOB and patially on toilet. (02/26/23 1201)  Toilet Transfers  Toilet - Technique: Ambulating (02/26/23 1207)  Equipment Used: Grab bars (02/26/23 1207)  Toilet Transfer: Moderate assistance (02/26/23 1207)  Toilet Transfers Comments: CGA on ModA off (02/26/23 1207)          Speech Therapy:      Comprehension:  (AUditory comprehension is [facility])  Verbal Expression:  (Mild to moderate deficits noted with divergent and convergent naming tasks. Min assist required to improve naming skills.)  Diet/Swallow:           Compensatory Swallowing Strategies : Alternate solids and liquids, Eat/Feed slowly, Upright as possible for all oral intake, Small bites/sips          Lab/X-ray studies reviewed, analyzed and discussed with patient and staff:   Recent Results (from the past 24 hour(s))   COVID-19, Rapid    Collection Time: 03/01/23 11:11 AM    Specimen: Nasopharyngeal Swab; Nasal swab   Result Value Ref Range    SARS-CoV-2, NAAT Not Detected Not Detected   Protime-INR    Collection Time: 03/02/23  5:03 AM   Result Value Ref Range    Protime 34.6 (H) 12.3 - 14.9 sec    INR 3.4        XR ABDOMEN   2/18/2023   1. Complete opacification of left hemithorax compatible with some combination of pleural effusion and atelectasis. Secondary obscuration of left heart border. 2.  Nonobstructive bowel gas pattern. No acute abdominal disease identified. 3.  Probable left renal stone. CT CHEST   2/23/2023 : Mediastinum: Thyroid is homogeneous in appearance. Vascular calcifications seen within the coronary vessels. Cardiac chambers are mildly enlarged. Pacer leads in satisfactory position. Moderate-sized pericardial effusion. Bulky adenopathy identified in the left hilar region likely reactive.   Small lymph nodes seen scattered throughout the mediastinum likely reactive. Atherosclerotic disease seen diffusely throughout the thoracic aorta. Lungs/pleura: There is small chest tube identified in place on the left with small left pleural effusion. Airspace consolidation seen within the left upper and lower lung fields suggesting superimposed infiltrate such as pneumonia. Mild increased markings identified at the right lung base to suggest atelectatic change. Trace pneumothorax identified anteriorly. Upper Abdomen: Stones in the gallbladder. Small hiatal hernia. Soft Tissues/Bones: Multilevel degenerative changes seen within the spine. No acute chest wall abnormality. Indwelling chest tube identified on the left with small left pleural effusion and trace anterior pneumothorax. Consolidation seen in airspace disease throughout the left upper and lower lobes to suggest a multifocal superimposed pneumonia. Soft tissue density seen in the hilar region to suggest possible reactive adenopathy. Moderate-sized pericardial effusion. Minimal atelectatic changes seen at the right lung base. Incidental stones in the gallbladder with small hiatal hernia. XR CHEST   2/19/2023   1. Minimal partial clearing of the left lung. The previously noted left pleural effusion is smaller   2. Stable position of the left chest tube   3. The right lung is clear. XR CHEST  2/18/2023 There is opacification of the left hemithorax. No evidence of pneumothorax. Air bronchograms are noted in the perihilar region. Since the prior study there appears to been placement of a left-sided chest tube with its tip near the left upper chest apex. Right-sided dual lead pacemaker is unchanged. Heart appears enlarged. Mild reticular opacities in the right lung are unchanged. Stable degenerative changes in the left shoulder and AC joint. 1.  Apparent interval placement of left chest tube catheter. No pneumothorax. 2.  Persistent opacification of the left hemithorax.      XR CHEST  2023   1. Near complete whiteout of the left hemithorax likely represent a combination of partial collapse of the left lung and large pleural effusion 2. Cardiomegaly 3. The right lung is grossly clear. US DUP UPPER EXTREMITY RIGHT VENOUS  2023     No evidence of DVT. FLUORO FOR SURGICAL PROCEDURES  2023  12 spot images of the lumbar spine were obtained. Intraprocedural fluoroscopic spot images as above. See separate procedure report for more information. EGD 2023  [id number] [facility] Patient: Maryam Mcgrath MRN: [id number] : 1935 Account: Gender: Male Age: 82 Years Procedure: Upper GI endoscopy Date: 2023 Attending Physician: Julio Thomason Indications:        -  Anemia        -  Melena Medications:        -  Monitored Anesthesia Care Complications:        -  No immediate complications. Estimated Blood Loss:        -  Estimated blood loss: none. Procedure:        - The Gastroscope was introduced through the mouth and advanced to the second           part of the duodenum.        -  The patient tolerated the procedure well. Findings:        -  A 2 cm hernia was found.        -  Esophagogastric landmarks were identified: the gastroesophageal junction           was found at 36 cm, the lower esophageal sphincter was found at 38 cm and the           upper extent of the gastric folds was found at 36 cm from the incisors. -  Patchy moderate inflammation characterized by erythema was found in the           gastric antrum and in the gastric body. -  The examined duodenum was normal.        -  The cardia and gastric fundus were normal on retroflexion.        - No old or fresh blood noted Impression:        -  2 cm hernia. -  Esophagogastric landmarks identified.        -  Gastritis. -  Normal examined duodenum.        -  No specimens collected.         - No old or fresh blood noted Recommendation:        -  Put patient on a clear liquid diet starting today. -  Continue present medications. -     - 01669, Esophagogastroduodenoscopy, flexible, transoral; diagnostic,           including collection of specimen(s) by brushing or washing, when performed           (separate procedure) Diagnosis Code(s):        - D64.9, Anemia, unspecified        - K92.1, Melena (includes Hematochezia)        - K44.9, Diaphragmatic hernia without obstruction or gangrene        - K29.70, Gastritis, unspecified, without bleeding           Previous extensive, complex labs, notes and diagnostics reviewed and analyzed. ALLERGIES:    Allergies as of 02/25/2023 - Fully Reviewed 02/25/2023   Allergen Reaction Noted    Benadryl [diphenhydramine hcl] Anxiety 01/31/2012    Diphenhydramine Anxiety 05/09/2018      (please also verify by checking [facility])          Complex Physical Medicine & Rehab Issues Assess & Plan:   Severe abnormality of gait and mobility and impaired self-care and ADL's secondary to progressive cardiopulmonary debility. Functional and medical status reassessed regarding patients ability to participate in therapies and patient found to be able to participate in acute intensive comprehensive inpatient rehabilitation program including PT/OT to improve balance, ambulation, ADLs, and to improve the P/AROM. Therapeutic modifications regarding activities in therapies, place, amount of time per day and intensity of therapy made daily. In bed therapies or bedside therapies prn. Bowel and Bladder dysfunction  , Neurogenic bowel and bladder:  frequent toileting, ambulate to bathroom with assistance, check post void residuals. Check for C.difficile x1 if >2 loose stools in 24 hours, continue bowel & bladder program.  Monitor bowel and bladder function. Lactinex 2 PO every AC. MOM prn, Brown Bomb prn, Glycerin suppository prn, enema prn. Encourage therapy and nursing to co-treat and problem solve re continence.     Severe back pain, lumbar, as well as generalized OA pain: reassess pain every shift and prior to and after each therapy session, give prn Tylenol and consider scheduled Tylenol, modalities prn in therapy, masage, Lidoderm, K-pad prn. Consider scheduled AM pain meds. Skin healing  ears and breakdown risk:  continue pressure relief program.  Daily skin exams and reports from nursing. Fatigue due to nutritional and hydration deficiency: Add and titrate vitamin B12 vitamin D and CoQ10 continue to monitor I&Os, calorie counts prn, dietary consult prn. Add healthy snack at night. Acute episodic insomnia with situational adjustment disorder:  prn Ambien, monitor for day time sedation. Falls risk elevated:  patient to use call light to get nursing assistance to get up, bed and chair alarm. Elevated DVT risk: progressive activities in PT, continue prophylaxis JOB hose, elevation and meds-see MAR. Complex discharge planning: Discharge March 12, 2023 home with his wife and home health care. Weekly team meeting every Monday to re-assess progress towards goals, discuss and address social, psychological and medical comorbidities and to address difficulties they may be having progressing in therapy. Patient and family education is in progress. The patient is to follow-up with their family physician after discharge. Complex Active General Medical Issues that complicate care Assess & Plan:    Mesothelioma-titrate O2, aerosols, follow-up with Dr. Jj Del Toro, drain pleural Dex daily versus 3 times a week per protocols from pulmonology and lung surgery consult  Mixed hyperlipidemia,  Atherosclerosis of native artery of both lower extremities with intermittent claudication, Atrial fibrillation, Venous insufficiency-Acute rehab to monitor heart rate and rhythm with the option of telemetry and the effects of chronotropic medication with respect to increasing physical activity and exercise in PT, OT, ADLs with medication titration to lowest effective dosing. Continue blood signs every shift focusing on heart rate, rhythm and blood pressure checks with orthostatic checks-monitoring the effect of exercise, therapy and posture. Consult hospitalist for backup medical and adjust/add medications (aspirin, Lasix, Lopressor, Crestor, Coumadin, bridging Lovenox). Monitor heart rate and blood pressure as well as medications effects on vital signs before during and after therapy with especial focus on preventing orthostasis and falls risk. Gastritis without bleeding-Elevate head of bed after meals, monitor stools for blood, lowest effective dose of PPI, consider Tums. Pneumonia of both lungs due to infectious organism-Merrem Acute rehab for endurance traing with Pulse Ox to monitoring oxygen saturation and heart rate with O2 titration to lowest effective dose. Pulse oximeter checks to shift and at HS to dose and titrate oxygen and aerosol treatments monitor for nocturnal hypoxemia, monitor vital signs, oxygen prn. Focus on energy conservation. eft a Pleurx tube in O2   drain  GERD-Elevate head of bed after meals, monitor stools for blood, lowest effective dose of PPI, consider Tums. Anticoagulation coumadinization for [id number]-[id number] pharmacist regarding INR management    Acute kidney failure-Eliminate toxic medications, monitor I's and O's focusing on urine output, recheck BMP. Spinal stenosis of lumbar region with neurogenic claudication    Balance disorder-focus on balance and therapy     Focus on reassessing rehab goals and coordinating therapy and medical self care issues.             Electronically signed by Lg Palmer DO on 2/27/23 at 8:24 AM WARREN Rashid D.O., PM&R     Attending    [facility] Anne Mishra

## 2023-03-02 NOTE — PROGRESS NOTES
[facility]  Facility/Department: [facility] [place] [place] REHAB  Speech Language Pathology   Treatment Note          Garth Rowe  1935  [id number]  [x]   confirmed    Date: 3/2/2023      Restrictions/Precautions: Fall Risk  Position Activity Restriction  Other position/activity restrictions: pleurx on L side    Weight: 256 lb 3.2 oz (116.2 kg)     ADULT DIET; Regular; Low Sodium (2 gm)    SpO2: 96 % (23 0730)  O2 Flow Rate (L/min): 2 L/min (23 0359)  No active isolations    Rehab Diagnosis:      Subjective:  Alert, Cooperative, and Pleasant        Interventions used this date:  Cognitive Skill Development    Objective/Assessment:  Patient progressing towards goals:  Short Term Goals  Time Frame for Short Term Goals: 2 weeks or LOS until goals are met.    Goal 2: To increase safety awareness and judgment for safe completion of ADLs secondary to pt's cognitive deficits,  pt will complete min-mod level problem solving tasks related to ADLs (e.g. medication) with 80% accuracy and min cues.  Patient completed a bill and coin adding task with 83% accuracy I, with supervised cueing 100%.    Patient completed an understanding, predicting, and recalling time problem solving task with 80% accuracy I, with supervised cueing 90%, and with min cueing 100%.    Patient completed a yearly planner calendar task with 60% accuracy I, with supervised cueing 100%. Patient was cued to reread the questions that had errors, he was then able to I correct his answer.     Goal 3: To increase safety awareness and judgment for safe completion of ADLs secondary to pt's cognitive deficits, pt will provide reasonable solutions to problems of everyday living with 80% accuracy and min cues. Patient stated appropriate solutions to scenarios during a min level problem solving task with 100% accuracy I.    Goal 4: To decrease pt's cognitive deficits through the use of compensatory strategies, the pt will be educated on 3 different memory  strategies and verbalize how he/she might use them at home in 3 ways with  min cues. When asked to recall previously discussed memory strategies, patient was unable to recall I, given a reminder of the repetition out loud strategy, the patient I recalled the writing things down strategy.     Patient completed a picture retention task with 90% accuracy I. ST suggested using the repetition out loud strategy or writing things down strategy to complete the task, the patient did not demonstrate use of either strategy.     Patient completed a word list retention with word placement task using the repetition out loud X2 strategy with the following accuracy:  4 word recall: 100% accuracy I  Word placement: 75% accuracy I, with repetition of word list 100%    Short-term Goals  Timeframe for Short-term Goals: n/a      Treatment/Activity Tolerance:  Patient tolerated treatment well    Plan:  Continue per POC    Pain Assessment:  Patient does not c/o pain.    Pain Re-assessment:  Patient does not c/o pain.    Patient/Caregiver Education:  Patient educated on session and progression towards goals.    Safety Devices:  Chair alarm in place      Speech Therapy Level of Assistance Scale    AUDITORY COMPREHENSION  Rating:  Supervised Assistance    VERBAL EXPRESSION  Rating:  Supervised Assistance    MOTOR SPEECH  Rating: Independent    PROBLEM SOLVING  Rating: Supervised Assistance-Minimal Assistance    MEMORY  Rating:  Supervised Assistance        Therapy Time  SLP Individual Minutes  Time In: 1000  Time Out: 1030  Minutes: 30            Signature: GEOFF Jaimes

## 2023-03-02 NOTE — PROGRESS NOTES
Physical Therapy Rehab Treatment Note  Facility/Department: [facility]  Room: R252/R252-01       NAME: Katherine Moody  : 1935 (82 y.o.)  MRN: [id number]  CODE STATUS: Full Code    Date of Service: 3/2/2023     Restrictions:  Restrictions/Precautions: Fall Risk  Position Activity Restriction  Other position/activity restrictions: pleurx on L side    SUBJECTIVE:   Subjective: \" I am still SOB when I am walking\"    Pain  Pain: 0/10 pain pre and post session    OBJECTIVE:     Sit to Stand  Assistance Level: Stand by assist;Supervision  Skilled Clinical Factors: Multiple attempts to rise  Stand to Sit  Assistance Level: Supervision  Bed To/From Chair  Assistance Level: Stand by assist  Skilled Clinical Factors: Increased time to complete requires use of UE's to complete    Ambulation  Surface: Carpet  Device: Rolling walker  Distance: 50' x 2  Activity: Within Unit  Activity Comments: Slow jefe increased lateral excursion, decreased heel strike L>R  Additional Factors: Verbal cues  Assistance Level: Supervision  Gait Deviations: Slow jefe;Decreased heel strike left  Skilled Clinical Factors: Completed without O2 donned. Maintains 93%+ with all gait trials    Stairs  Stair Height: 6''  Device: Bilateral handrails  Number of Stairs: 4  Additional Factors: Verbal cues; Non-reciprocal going down;Reciprocal going up  Assistance Level: Stand by assist  Skilled Clinical Factors: Completed with 2L 93% post stair negotiation improved 2 96% with seated rest break    PT Exercises  A/AROM Exercises: Seated abdominal bracing x 10 Seated trunk extension into physioball x 10 seateaed rows RTB x 20     ASSESSMENT/PROGRESS TOWARDS GOALS:  Assessment  Assessment: Patient continues to complete gait and stair negotiation without O2 donned maintaining 93% post exertion.  Improved jefe and decreased time to recover due to SOB with gait and transfers  Activity Tolerance: Patient tolerated treatment well  Discharge Recommendations: Continue to assess pending progress    Goals:  Long Term Goals  Long Term Goal 1: indep bed mobility  Long Term Goal 2: Pt will demonstrate transfers supervision with safest AD  Long Term Goal 3: Pt will demonstrate amb >/= 150ft supervision with safest AD  Long Term Goal 4: Pt will demonstrate stair negotiation 3 steps without rails with safest AD SBA  Long Term Goal 5: Pt will demonstrate pantoja balance assessment >/= 45/56 for decreased risk for falls. Patient Goals   Patient Goals : Sven Davis out of chairs easier. drive a car.  stand for longer time. \"    PLAN OF CARE/Safety:   Safety Devices  Type of Devices: All fall risk precautions in place; Chair alarm in place      Therapy Time:   Individual   Time In 930   Time Out 1000   Minutes 30     Minutes: 30  Transfer/Bed mobility trainin  Gait training: 15  Therapeutic ex: Iker Buck PTA, 23 at 12:18 PM

## 2023-03-03 LAB
INR BLD: 3.4
PROTHROMBIN TIME: 34.5 SEC (ref 12.3–14.9)
SARS-COV-2, NAAT: NOT DETECTED

## 2023-03-03 PROCEDURE — 2700000000 HC OXYGEN THERAPY PER DAY

## 2023-03-03 PROCEDURE — 99232 SBSQ HOSP IP/OBS MODERATE 35: CPT | Performed by: PHYSICAL MEDICINE & REHABILITATION

## 2023-03-03 PROCEDURE — 97530 THERAPEUTIC ACTIVITIES: CPT

## 2023-03-03 PROCEDURE — 36415 COLL VENOUS BLD VENIPUNCTURE: CPT

## 2023-03-03 PROCEDURE — 85610 PROTHROMBIN TIME: CPT

## 2023-03-03 PROCEDURE — 97129 THER IVNTJ 1ST 15 MIN: CPT

## 2023-03-03 PROCEDURE — 97116 GAIT TRAINING THERAPY: CPT

## 2023-03-03 PROCEDURE — 6370000000 HC RX 637 (ALT 250 FOR IP): Performed by: FAMILY MEDICINE

## 2023-03-03 PROCEDURE — 97535 SELF CARE MNGMENT TRAINING: CPT

## 2023-03-03 PROCEDURE — 97112 NEUROMUSCULAR REEDUCATION: CPT

## 2023-03-03 PROCEDURE — 97130 THER IVNTJ EA ADDL 15 MIN: CPT

## 2023-03-03 PROCEDURE — 99232 SBSQ HOSP IP/OBS MODERATE 35: CPT | Performed by: INTERNAL MEDICINE

## 2023-03-03 PROCEDURE — 87635 SARS-COV-2 COVID-19 AMP PRB: CPT

## 2023-03-03 PROCEDURE — 1180000000 HC REHAB R&B

## 2023-03-03 PROCEDURE — 6370000000 HC RX 637 (ALT 250 FOR IP): Performed by: PHYSICAL MEDICINE & REHABILITATION

## 2023-03-03 RX ADMIN — GUAIFENESIN 600 MG: 600 TABLET ORAL at 08:38

## 2023-03-03 RX ADMIN — METOPROLOL TARTRATE 25 MG: 25 TABLET, FILM COATED ORAL at 19:49

## 2023-03-03 RX ADMIN — METOPROLOL TARTRATE 25 MG: 25 TABLET, FILM COATED ORAL at 08:39

## 2023-03-03 RX ADMIN — ASPIRIN 81 MG: 81 TABLET, COATED ORAL at 08:39

## 2023-03-03 RX ADMIN — ROSUVASTATIN CALCIUM 10 MG: 5 TABLET, FILM COATED ORAL at 08:38

## 2023-03-03 RX ADMIN — Medication 5 MG: at 19:49

## 2023-03-03 RX ADMIN — Medication 2000 UNITS: at 16:44

## 2023-03-03 RX ADMIN — OXYCODONE 10 MG: 5 TABLET ORAL at 14:58

## 2023-03-03 RX ADMIN — NALOXEGOL OXALATE 25 MG: 12.5 TABLET, FILM COATED ORAL at 08:38

## 2023-03-03 RX ADMIN — GUAIFENESIN 600 MG: 600 TABLET ORAL at 19:48

## 2023-03-03 RX ADMIN — OXYCODONE 5 MG: 5 TABLET ORAL at 08:41

## 2023-03-03 RX ADMIN — Medication 100 MG: at 08:38

## 2023-03-03 RX ADMIN — OXYCODONE 5 MG: 5 TABLET ORAL at 19:48

## 2023-03-03 RX ADMIN — FUROSEMIDE 20 MG: 20 TABLET ORAL at 08:39

## 2023-03-03 ASSESSMENT — PAIN DESCRIPTION - LOCATION
LOCATION: SHOULDER
LOCATION: ARM
LOCATION: SHOULDER

## 2023-03-03 ASSESSMENT — PAIN SCALES - GENERAL
PAINLEVEL_OUTOF10: 7
PAINLEVEL_OUTOF10: 4
PAINLEVEL_OUTOF10: 6
PAINLEVEL_OUTOF10: 0

## 2023-03-03 ASSESSMENT — PAIN DESCRIPTION - ORIENTATION
ORIENTATION: LEFT

## 2023-03-03 NOTE — PROGRESS NOTES
Subjective: The patient complains of moderate to severe acute on chronic progressive fatigue and  PRIETO partially relieved by rest, medications, PT,  OT,   SLP and rest and exacerbated by recent illness  . Patient had initially been treated at [facility] [place] where a nerve stimulator placement was attempted but delayed due to high INR. Once the procedure was finally initiated it was aborted because of possible CSF leak. Patient became shortness of breath postprocedure but this was found to be largely unrelated to the procedure but related to a large loculated pleural effusion. Patient was transferred to [facility] for thoracotomy and VATS procedure performed by Dr. Jimmy Dance. As noted patient had a chest tube inserted by Dr. Jimmy Dance on 2/18/2023. He has been followed by thoracic surgery pulmonology oncology GI and cardiology as well as the hospitalist.     From a thoracic surgery standpoint cultures were sent from his pleural effusion which were positive and he is now on IV antibiotics. The left a Pleurx tube in O2   drain 3 times a week. The plan is for outpatient removal of the drain and if any worsening symptoms occur after the Pleurx is removed possible VATS surgery. They are trying to avoid surgery for now. Pulmonology followed him advising titrating O2 to keep sats above 90 continue home CPAP and continue draining the Pleurx daily. 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 He they noted his symptoms were improving and he has a history of following with Dr. Eual Schirmer at [facility] in [facility] regarding his mesothelioma. Discussed options regarding possible chemotherapy palliative care intermittent drainage of the Pleurx catheter and possible hospice care. Currently they are advising treating anemia with IV Venofer here.   Cardiology was consulted and obtain an echocardiogram to assess the pericardial effusion    I am concerned about patients medical complexities and barriers to advancing in rehab goals including  improved pain control. I reviewed current care and plans for further care with other rehab providers including nursing and case management. According to recent nursing note, \" Denies any pain. Pt lungs diminished . 1-2+ generalized edema, on 2 L oxygen and CPAP through hs. Pt up to BR x2 with W/W SBA\". He complains of a dry cough unrelieved with Mucinex previous COVID testing was negative I will repeat COVID testing just to be sure he is not developing it as he has been exposed. ROS x10: The patient also complains of severely impaired mobility and activities of daily living. Otherwise no new problems with vision, hearing, nose, mouth, throat, dermal, cardiovascular, GI, , pulmonary, musculoskeletal, psychiatric or neurological. See also Acute Rehab PM&R H&P. Vital signs:  /71   Pulse 73   Temp 99 °F (37.2 °C) (Oral)   Resp 18   Ht 5' 7\" (1.702 m)   Wt 256 lb 3.2 oz (116.2 kg)   SpO2 93%   BMI 40.13 kg/m²   I/O:   PO/Intake:  fair PO intake,   Reg diet    Bowel:   continent LBM 3/1  Bladder: continent    bishop  General:  Patient is well developed,   adequately nourished, and    well kempt. HEENT:    Pupils equal, hearing intact to loud voice, external inspection of ear and nose benign. Inspection of lips, tongue and gums  thrush    Musculoskeletal: No significant change in strength or tone. All joints stable. Inspection and palpation of digits and nails show no clubbing, cyanosis or inflammatory conditions. Neuro/Psychiatric: Affect: flat but pleasant. Alert and oriented to person, place and situation with  min cues. No significant change in deep tendon reflexes or sensation  Lungs:  Diminished, CTA-B. Respiration effort is   normal at rest.   Pleurx cath dressing D&I L Side. Heart:   S1 = S2,   RRR. Abdomen:  Soft, non-tender, no enlargement of liver or spleen.   Extremities:   mod lower extremity edema    Skin:   Intact to general survey,  Pleurx cath dressing D&I L Side. Rehabilitation:  Physical Therapy:   Bed mobility:  Bed mobility  Rolling to Left: Minimal assistance (02/26/23 1242)  Rolling to Right: Minimal assistance (02/26/23 1242)  Supine to Sit: Moderate assistance (02/26/23 1242)  Sit to Supine: Moderate assistance (02/26/23 1242)  Bed Mobility Comments: HOB flat; cues to avoid breath holding (02/26/23 1242)  Roll Left  Assistance Level: Stand by assist (03/02/23 1317)  Skilled Clinical Factors: With use of bed rail (03/02/23 1317)  Roll Right  Assistance Level: Stand by assist (03/02/23 1317)  Skilled Clinical Factors: With use of bed rail (03/02/23 1317)  Sit to Supine  Assistance Level: Stand by assist (03/02/23 1317)  Skilled Clinical Factors: Increased time to complete (03/02/23 1317)  Supine to Sit  Assistance Level: Stand by assist (03/02/23 1317)  Skilled Clinical Factors: Increased time and effort (03/02/23 1317)  Scooting  Assistance Level: Stand by assist (03/02/23 1317)  Transfers:  Transfers  Sit to Stand:  Moderate Assistance (02/26/23 1243)  Stand to Sit: Moderate Assistance (02/26/23 1243)  Comment: poor use of L LE during sit to stand; [employer] used (02/26/23 1243)  Sit to Stand  Assistance Level: Stand by assist;Supervision (03/02/23 1317)  Skilled Clinical Factors: Multiple attempts to rise (03/02/23 1317)  Stand to Sit  Assistance Level: Supervision (03/02/23 1317)  Skilled Clinical Factors: Improved hand placement eccentric control (02/28/23 1411)  Bed To/From Chair  Technique: Stand step (03/02/23 1317)  Assistance Level: Stand by assist (03/02/23 1317)  Skilled Clinical Factors: Increased time to complete requires use of UE's to complete (03/02/23 1317)  Car Transfer  Assistance Level: Minimal assistance (02/27/23 0942)  Skilled Clinical Factors: [place] to rise from low level chair able to lift BLE in and out of car (02/27/23 0942)  Gait:   Ambulation  Surface: Level tile (03/02/23 1406)  Device: Rolling Walker (03/02/23 1406)  Other Apparatus: O2 (03/02/23 1406)  Assistance: Contact guard assistance;Stand by assistance (03/02/23 1406)  Quality of Gait: cues for posture and upward gaze, decreased step length and height (03/02/23 1406)  Gait Deviations: Slow Jefe; Increased MENA; Decreased step length;Decreased step height (02/26/23 1244)  Distance: 100' (03/02/23 1406)  Ambulation  Surface: Carpet (03/02/23 1214)  Device: Rolling walker (03/02/23 1214)  Distance: 50' x 2 (03/02/23 1214)  Activity: Within Unit (03/02/23 1214)  Activity Comments: Slow jefe increased lateral excursion, decreased heel strike L>R (03/02/23 1214)  Additional Factors: Verbal cues (03/02/23 1214)  Assistance Level: Supervision (03/02/23 1214)  Gait Deviations: Slow jefe;Decreased heel strike left (03/02/23 1214)  Skilled Clinical Factors: Completed without O2 donned. Maintains 93%+ with all gait trials (03/02/23 1214)  Stairs:  Stairs/Curb  Stairs?: No (03/02/23 1406)  Stairs  Stair Height: 6'' (03/02/23 1214)  Device: Bilateral handrails (03/02/23 1214)  Number of Stairs: 4 (03/02/23 1214)  Additional Factors: Verbal cues; Non-reciprocal going down;Reciprocal going up (03/02/23 1214)  Assistance Level: Stand by assist (03/02/23 1214)  Skilled Clinical Factors: Completed with 2L 93% post stair negotiation improved 2 96% with seated rest break (03/02/23 1214)  W/C mobility:       Occupational Therapy:   Hand Dominance: Right  ADL  Feeding: Stand by assistance (02/26/23 1201)  Grooming: Minimal assistance (02/26/23 1201)  UE Bathing: Minimal assistance (02/26/23 1201)  LE Bathing: Maximum assistance (02/26/23 1201)  LE Bathing Skilled Clinical Factors: periare only per pt request (02/26/23 1201)  UE Dressing:  Moderate assistance (02/26/23 1201)  LE Dressing: Maximum assistance (02/26/23 1201)  Toileting: Maximum assistance (02/26/23 1201)  Additional Comments: sponge bath ADL completed sitting EOB and patially on toilet. (02/26/23 1201)  Toilet Transfers  Toilet - Technique: Ambulating (02/26/23 1207)  Equipment Used: Grab bars (02/26/23 1207)  Toilet Transfer: Moderate assistance (02/26/23 1207)  Toilet Transfers Comments: CGA on ModA off (02/26/23 1207)          Speech Therapy:      Comprehension:  (AUditory comprehension is [facility])  Verbal Expression:  (Mild to moderate deficits noted with divergent and convergent naming tasks. Min assist required to improve naming skills.)  Diet/Swallow:           Compensatory Swallowing Strategies : Alternate solids and liquids, Eat/Feed slowly, Upright as possible for all oral intake, Small bites/sips          Lab/X-ray studies reviewed, analyzed and discussed with patient and staff:   Recent Results (from the past 24 hour(s))   Protime-INR    Collection Time: 03/03/23  5:14 AM   Result Value Ref Range    Protime 34.5 (H) 12.3 - 14.9 sec    INR 3.4        XR ABDOMEN   2/18/2023   1. Complete opacification of left hemithorax compatible with some combination of pleural effusion and atelectasis. Secondary obscuration of left heart border. 2.  Nonobstructive bowel gas pattern. No acute abdominal disease identified. 3.  Probable left renal stone. CT CHEST   2/23/2023 : Mediastinum: Thyroid is homogeneous in appearance. Vascular calcifications seen within the coronary vessels. Cardiac chambers are mildly enlarged. Pacer leads in satisfactory position. Moderate-sized pericardial effusion. Bulky adenopathy identified in the left hilar region likely reactive. Small lymph nodes seen scattered throughout the mediastinum likely reactive. Atherosclerotic disease seen diffusely throughout the thoracic aorta. Lungs/pleura: There is small chest tube identified in place on the left with small left pleural effusion. Airspace consolidation seen within the left upper and lower lung fields suggesting superimposed infiltrate such as pneumonia.   Mild increased markings identified at the right lung base to suggest atelectatic change. Trace pneumothorax identified anteriorly. Upper Abdomen: Stones in the gallbladder. Small hiatal hernia. Soft Tissues/Bones: Multilevel degenerative changes seen within the spine. No acute chest wall abnormality. Indwelling chest tube identified on the left with small left pleural effusion and trace anterior pneumothorax. Consolidation seen in airspace disease throughout the left upper and lower lobes to suggest a multifocal superimposed pneumonia. Soft tissue density seen in the hilar region to suggest possible reactive adenopathy. Moderate-sized pericardial effusion. Minimal atelectatic changes seen at the right lung base. Incidental stones in the gallbladder with small hiatal hernia. XR CHEST   2023   1. Minimal partial clearing of the left lung. The previously noted left pleural effusion is smaller   2. Stable position of the left chest tube   3. The right lung is clear. XR CHEST  2023   1. Apparent interval placement of left chest tube catheter. No pneumothorax. 2.  Persistent opacification of the left hemithorax. XR CHEST  2023   1. Near complete whiteout of the left hemithorax likely represent a combination of partial collapse of the left lung and large pleural effusion   2. Cardiomegaly   3. The right lung is grossly clear. US DUP UPPER EXTREMITY RIGHT VENOUS  2023   No evidence of DVT. FLUORO FOR SURGICAL PROCEDURES  2023  12 spot images of the lumbar spine were obtained. Intraprocedural fluoroscopic spot images as above. See separate procedure report for more information.      EGD 2023  [id number] [facility] Patient: Tasneem Padilla MRN: [id number] : 1935 Account: Gender: Male Age: 82 Years Procedure: Upper GI endoscopy Date: 2023 Attending Physician: Yaa Wick Indications:        -  Anemia        -  Melena Medications:        -  Monitored Anesthesia Care Complications:        -  No immediate complications. Estimated Blood Loss:        -  Estimated blood loss: none. Procedure:        - The Gastroscope was introduced through the mouth and advanced to the second           part of the duodenum.        -  The patient tolerated the procedure well. Findings:        -  A 2 cm hernia was found.        -  Esophagogastric landmarks were identified: the gastroesophageal junction           was found at 36 cm, the lower esophageal sphincter was found at 38 cm and the           upper extent of the gastric folds was found at 36 cm from the incisors. -  Patchy moderate inflammation characterized by erythema was found in the           gastric antrum and in the gastric body. -  The examined duodenum was normal.        -  The cardia and gastric fundus were normal on retroflexion.        - No old or fresh blood noted Impression:        -  2 cm hernia. -  Esophagogastric landmarks identified.        -  Gastritis. -  Normal examined duodenum.        -  No specimens collected. - No old or fresh blood noted Recommendation:        -  Put patient on a clear liquid diet starting today. -  Continue present medications. -     - [address]3922, Esophagogastroduodenoscopy, flexible, transoral; diagnostic,           including collection of specimen(s) by brushing or washing, when performed           (separate procedure) Diagnosis Code(s):        - D64.9, Anemia, unspecified        - K92.1, Melena (includes Hematochezia)        - K44.9, Diaphragmatic hernia without obstruction or gangrene        - K29.70, Gastritis, unspecified, without bleeding           Previous extensive, complex labs, notes and diagnostics reviewed and analyzed.      ALLERGIES:    Allergies as of 02/25/2023 - Fully Reviewed 02/25/2023   Allergen Reaction Noted    Benadryl [diphenhydramine hcl] Anxiety 01/31/2012    Diphenhydramine Anxiety 05/09/2018      (please also verify by checking MAR)      Complex Physical Medicine & Rehab Issues Assess & Plan:   Severe abnormality of gait and mobility and impaired self-care and ADL's secondary to progressive cardiopulmonary debility.  Functional and medical status reassessed regarding patient’s ability to participate in therapies and patient found to be able to participate in acute intensive comprehensive inpatient rehabilitation program including PT/OT to improve balance, ambulation, ADL’s, and to improve the P/AROM.  Therapeutic modifications regarding activities in therapies, place, amount of time per day and intensity of therapy made daily.  In bed therapies or bedside therapies prn.   Bowel and Bladder dysfunction  , Neurogenic bowel and bladder:  frequent toileting, ambulate to bathroom with assistance, check post void residuals.  Check for C.difficile x1 if >2 loose stools in 24 hours, continue bowel & bladder program.  Monitor bowel and bladder function.  Lactinex 2 PO every AC.  MOM prn, Brown Bomb prn, Glycerin suppository prn, enema prn.  Encourage therapy and nursing to co-treat and problem solve re continence.    Severe back pain, lumbar, as well as generalized OA pain: reassess pain every shift and prior to and after each therapy session, give prn Tylenol and consider scheduled Tylenol, modalities prn in therapy, masage, Lidoderm, K-pad prn. Consider scheduled AM pain meds.  Skin healing  ears and breakdown risk:  continue pressure relief program.  Daily skin exams and reports from nursing.  Fatigue due to nutritional and hydration deficiency: Add and titrate vitamin B12 vitamin D and CoQ10 continue to monitor I&O’s, calorie counts prn, dietary consult prn.  Add healthy snack at night.  Acute episodic insomnia with situational adjustment disorder:  prn Ambien, monitor for day time sedation.  Falls risk elevated:  patient to use call light to get nursing assistance to get up, bed and chair alarm.  Elevated DVT risk: progressive activities in  PT, continue prophylaxis JOB hose, elevation and meds-see MAR. Complex discharge planning: Discharge March 12, 2023 home with his wife and home health care. Weekly team meeting every Monday to re-assess progress towards goals, discuss and address social, psychological and medical comorbidities and to address difficulties they may be having progressing in therapy. Patient and family education is in progress. The patient is to follow-up with their family physician after discharge. Complex Active General Medical Issues that complicate care Assess & Plan:    Mesothelioma with dry cough-titrate O2, aerosols, follow-up with Dr. Eual Schirmer, drain pleural Dex daily versus 3 times a week per protocols from pulmonology and lung surgery consult  Mixed hyperlipidemia,  Atherosclerosis of native artery of both lower extremities with intermittent claudication, Atrial fibrillation, Venous insufficiency-Acute rehab to monitor heart rate and rhythm with the option of telemetry and the effects of chronotropic medication with respect to increasing physical activity and exercise in PT, OT, ADLs with medication titration to lowest effective dosing. Continue blood signs every shift focusing on heart rate, rhythm and blood pressure checks with orthostatic checks-monitoring the effect of exercise, therapy and posture. Consult hospitalist for backup medical and adjust/add medications (aspirin, Lasix, Lopressor, Crestor, Coumadin, bridging Lovenox). Monitor heart rate and blood pressure as well as medications effects on vital signs before during and after therapy with especial focus on preventing orthostasis and falls risk. Gastritis without bleeding-Elevate head of bed after meals, monitor stools for blood, lowest effective dose of PPI, consider Tums.     Pneumonia of both lungs due to infectious organism-Merrem Acute rehab for endurance traing with Pulse Ox to monitoring oxygen saturation and heart rate with O2 titration to lowest effective dose. Pulse oximeter checks to shift and at HS to dose and titrate oxygen and aerosol treatments monitor for nocturnal hypoxemia, monitor vital signs, oxygen prn. Focus on energy conservation. eft a Pleurx tube in O2   drain  GERD-Elevate head of bed after meals, monitor stools for blood, lowest effective dose of PPI, consider Tums. Anticoagulation coumadinization for U-vva-swtskin pharmacist regarding INR management    Acute kidney failure-Eliminate toxic medications, monitor I's and O's focusing on urine output, recheck BMP. Spinal stenosis of lumbar region with neurogenic claudication    Balance disorder-focus on balance and therapy     Focus on emotional health and caregiver involvement in care.   Rule out COVID-19            Electronically signed by Jack Montgomery DO on 2/27/23 at 8:24 AM WARREN Soliz D.O., PM&R     Attending    [address]

## 2023-03-03 NOTE — PLAN OF CARE
Problem: Discharge Planning  Goal: Discharge to home or other facility with appropriate resources  3/2/2023 2303 by Ray Gray, RN  Outcome: Progressing  Flowsheets (Taken 3/2/2023 2120)  Discharge to home or other facility with appropriate resources: Identify barriers to discharge with patient and caregiver  3/2/2023 1017 by Sangeeta Arellano RN  Outcome: Progressing  Flowsheets (Taken 3/2/2023 2017)  Discharge to home or other facility with appropriate resources: Identify barriers to discharge with patient and caregiver

## 2023-03-03 NOTE — PROGRESS NOTES
[facility]  Facility/Department: [facility]  Speech Language Pathology   Treatment Note          Leroy Silva  1935  [id number]  [x]   confirmed    Date: 3/3/2023      Restrictions/Precautions: Fall Risk  Position Activity Restriction  Other position/activity restrictions: pleurx on L side    Weight: 256 lb 3.2 oz (116.2 kg)     ADULT DIET; Regular    SpO2: 93 % (23 0730)  O2 Flow Rate (L/min): 2 L/min (23 0418)  No active isolations    Rehab Diagnosis:      Subjective:  Alert, Cooperative, and Pleasant      Patient reported SOB at the beginning of the speech session. ST took patient's pulse-ox, noted 98%. Secondary to SOB and cough, ST had patient writing answers during therapy tasks as much as possible to keep patient more comfortable throughout the session. Interventions used this date:  Cognitive Skill Development    Objective/Assessment:  Patient progressing towards goals:  Short Term Goals  Time Frame for Short Term Goals: 2 weeks or LOS until goals are met. Goal 1: To increase safety awareness and judgment for safe completion of ADLs secondary to pt's cognitive deficits, pt will complete abstract reasoning tasks (i.e. Word deduction, convergent and divergent naming, similarities/differences) with 80% accuracy and min cues. Patient named 5 items in a concrete category with 77% accuracy I, with min cueing 100% accuracy. Secondary to patient reporting SOB and coughing, ST had patient write answers rather than giving verbal answers this date. Goal 2: To increase safety awareness and judgment for safe completion of ADLs secondary to pt's cognitive deficits,  pt will complete min-mod level problem solving tasks related to ADLs (e.g. medication) with 80% accuracy and min cues. Patient completed a 4-step sequencing task with 75% accuracy I, with supervised cueing 100% accuracy. Goal 4:  To decrease pt's cognitive deficits through the use of compensatory strategies, the pt will be educated on 3 different memory strategies and verbalize how he/she might use them at home in 3 ways with  min cues. Patient completed a word list retention task using the memory strategy of writing things down, secondary to patient reporting SOB, with the following accuracy:  4 word recall: 100% accuracy I  Word placement: 40% accuracy I, with repetition 80%, and with phonemic cueing 100%. Treatment/Activity Tolerance:  Patient tolerated treatment well    Plan:  Continue per POC    Pain Assessment:  Patient does not c/o pain. Pain Re-assessment:  Patient does not c/o pain. Patient/Caregiver Education:  Patient educated on session and progression towards goals.     Safety Devices:  Chair alarm in place      Speech Therapy Level of Assistance Scale    AUDITORY COMPREHENSION  Rating:  Supervised Assistance    VERBAL EXPRESSION  Rating:  Supervised Assistance    MOTOR SPEECH  Rating: Independent     PROBLEM SOLVING  Rating: Supervised Assistance    MEMORY  Rating:  Supervised Assistance      Therapy Time  SLP Individual Minutes  Time In: 0930  Time Out: 1000  Minutes: 30            Signature: GEOFF Novak

## 2023-03-03 NOTE — PROGRESS NOTES
Pt assessment completed. Denies any pain. Pt lungs diminished . 1-2+ generalized edema, on 2 L oxygen and CPAP through hs. Pt up to BR x2 with W/W SBA

## 2023-03-03 NOTE — PROGRESS NOTES
Physical Therapy Rehab Treatment Note  [facility]/Department[facility]  [facility]       NAME: Luis Stubbs  : 1935 (82 y.o.)  MRN: [id number]  CODE STATUS: Full Code    Date of Service: 3/3/2023       Restrictions:  Restrictions/Precautions: Fall Risk  Position Activity Restriction  Other position/activity restrictions: pleurx on L side       SUBJECTIVE:   Subjective: I am doing good except for this cough    Pain  Pain: 6-7 L shoulder achy sore pre session and post session. Medicated during lunch      OBJECTIVE:   Outcomes Measures:  Teixeira Balance Score: 34     Sit to Stand  Assistance Level: Stand by assist;Supervision  Skilled Clinical Factors: Multiple attempts to rise  Stand to Sit  Assistance Level: Supervision  Bed To/From Chair  Assistance Level: Stand by assist;Supervision    Ambulation  Surface: Carpet  Device: Rolling walker  Distance: 50'  Activity: Within Unit  Activity Comments: Slow jefe increased lateral excursion, decreased heel strike L>R  Additional Factors: Verbal cues  Assistance Level: Supervision  Gait Deviations: Slow jefe;Decreased heel strike left  Skilled Clinical Factors: Completed with 2L O2 donned mild SOB recovers quickly    Stairs  Stair Height: 6''  Device: Bilateral handrails  Number of Stairs: 4  Additional Factors: Verbal cues; Non-reciprocal going down;Reciprocal going up    Activity Tolerance  Activity Tolerance: Patient tolerated treatment well    ASSESSMENT/PROGRESS TOWARDS GOALS:   Assessment  Assessment: Completed TEIXEIRA balance test this session improving to  34/56. Patient continues to require rest break post mobility due to shortness of breath.  Maintains 95% throughout with 2L on  Activity Tolerance: Patient tolerated treatment well  Discharge Recommendations: Continue to assess pending progress    Goals:  Long Term Goals  Long Term Goal 1: indep bed mobility  Long Term Goal 2: Pt will demonstrate transfers supervision with safest AD  Long Term Goal 3: Pt will demonstrate amb >/= 150ft supervision with safest AD  Long Term Goal 4: Pt will demonstrate stair negotiation 3 steps without rails with safest AD SBA  Long Term Goal 5: Pt will demonstrate pantoja balance assessment >/= 45/56 for decreased risk for falls. Patient Goals   Patient Goals : Karen Legions out of chairs easier. drive a car.  stand for longer time. \"    PLAN OF CARE/Safety:   Safety Devices  Type of Devices: All fall risk precautions in place; Chair alarm in place      Therapy Time:   Individual   Time In 1300   Time Out 1400   Minutes 60     Minutes: 60  Transfer/Bed mobility training: 15  Gait trainin  Neuro re education: [id number] Kristen Buck PTA, 23 at 4:17 PM

## 2023-03-03 NOTE — PROGRESS NOTES
OCCUPATIONAL THERAPY  INPATIENT REHAB TREATMENT NOTE  [address] [address]      NAME: Shahana Hameed  : 1935 (82 y.o.)  MRN: [id number]  CODE STATUS: Full Code  Room: C316/L402-03    Date of Service: 3/3/2023    Referring Physician: Dr. Hebert Kate Diagnosis: Impaired mobility and ADL's due to severe pulmonary debility    Restrictions  Restrictions/Precautions  Restrictions/Precautions: Fall Risk     Position Activity Restriction  Other position/activity restrictions: pleurx on L side    Patient's date of birth confirmed: Yes    SAFETY:  Safety Devices  Safety Devices in place: Yes  Type of devices: All fall risk precautions in place    SUBJECTIVE:  Subjective: \" My sweetheart of 62 years. \" - patients spouse     Pain at start of treatment:  Yes 5/10    Pain at end of treatment:   Yes 10    Pain Location: L shoulder  Pain Descriptors: ache  Nursing notified: yes  RN: Shimon Castillo  Intervention: RN administered pain medication    COGNITION:  Orientation  Overall Orientation Status: Within Functional Limits  Orientation Level: Oriented X4  Cognition  Overall Cognitive Status: WFL  Cognition Comment: follows commands consistently    OBJECTIVE    Instrumental ADL's  Instrumental ADLs: Yes  Health Management  Health Management Level of Assistance: Modified independent  Health Management:   Medication Management Simulation Activity:  Patient completed simulation activity utilizing 7 provided medication bottles with written instruction to place a total of 74 beads into the provided weekly medication organizer. Patient with MOD difficulty opening medication bottles. Patient with MIN difficulty opening organizer boxes. Patient placed a total of 74 beads into organizer with 0 verbal cues and a total of 74 being correct. Patient with MIN difficulty manipulating beads. Patient able to sort beads back into correct bottles with 0 errors. Patient able to securely close 7/7 caps on medication bottles.     Peg Activity:  Patient engaged in B UE ROM/strengthening, B FM coordination and cognition to increase I with ADL's, IADL's and transfers. Patient donned B 1 # wrist weights. Patient able to reach in lateral planes with MIN L UE difficulty. Patient able to reach in forward planes with MAX L UE difficulty. Patient able to  100 large mushroom pegs with 0 difficulty 1 at a time. Patient able to place large pegs 1 at a time in pegboard with 0 difficulty. Patient alternated UE's after every 10th peg. Patient able to  50 marbles with R FM 0 difficulty 1 at a time. Patient able to  50 pennies with L FM MOD difficulty 1 at a time. Patient able to follow AB pattern with 0 verbal cues while placing marbles and pennies on large pegs. During placement of pennies and marbles, therapist graded activity by removing L 1# writ weight 2° MAX difficulty with forward reaching. Patient with 0 difficulty placing marbles on large pegs with R FM. Patient with MIN difficulty placing pennies on large pegs with L FM. Patient with rest breaks as needed. ASSESSMENT: Patient worked at a steady pace. Activity Tolerance: Patient tolerated treatment well      PLAN OF CARE:  Balance training, Functional mobility training, Strengthening, ROM, Endurance training, Safety education & training, Patient/Caregiver education & training, Equipment evaluation, education, & procurement, Home management training, Self-Care / ADL, Coordination training    Continue OT POC until discharge    Patient goals : to get stronger and possibly return to driving  Time Frame for Long Term Goals : within 1.5-2wks pt will demosntrate progress in the following areas to achieve specific LTG's listed in the initial eval  Long Term Goal 1: improve overall strength/endurance for functional tasks.   Long Term Goal 2: improve independence with self care  Long Term Goal 3: improve sitting/standing balance for ADL tasks  Long Term Goal 4: improve functional mobility with LRD for safe item transport/retrieval  Long Term Goal 5: improve FMC to independently manipulate fasteners        Therapy Time:   Individual Group Co-Treat   Time In 1400       Time Out 1500         Minutes 60                   ADL/IADL trainin minutes  Therapeutic activities: 35 minutes     Electronically signed by:    HERMAN Pabon,   3/3/2023, 3:23 PM

## 2023-03-03 NOTE — PROGRESS NOTES
PleurX drain dressing has small amount of dried serous drainage, removed dressing and drained 200 ml of SS orange colored drainage using sterile technique according to procedure. New dressing applied. Pt maty well, no SOB or pain noted or reported. Call light in reach.  Electronically signed by Vale Lemos RN on 3/3/2023 at 6:34 PM

## 2023-03-03 NOTE — PLAN OF CARE
Problem: Discharge Planning  Goal: Discharge to home or other facility with appropriate resources  3/3/2023 1257 by Amandeep Dinh RN  Outcome: Progressing  3/2/2023 2303 by Nadya Mccoy RN  Outcome: Progressing  Flowsheets (Taken 3/2/2023 2120)  Discharge to home or other facility with appropriate resources: Identify barriers to discharge with patient and caregiver     Problem: Pain  Goal: Verbalizes/displays adequate comfort level or baseline comfort level  3/3/2023 1257 by Amandeep Dinh RN  Outcome: Progressing  3/2/2023 2303 by Nadya Mccoy RN  Outcome: Progressing  Flowsheets (Taken 3/2/2023 2120)  Verbalizes/displays adequate comfort level or baseline comfort level: Encourage patient to monitor pain and request assistance     Problem: Safety - Adult  Goal: Free from fall injury  3/3/2023 1257 by Amandeep Dinh RN  Outcome: Progressing  3/2/2023 2303 by Nadya Mccoy RN  Outcome: Progressing     Problem: ABCDS Injury Assessment  Goal: Absence of physical injury  3/3/2023 1257 by Amandeep Dinh RN  Outcome: Progressing  3/2/2023 2303 by Nadya Mccoy RN  Outcome: Progressing     Problem: Chronic Conditions and Co-morbidities  Goal: Patient's chronic conditions and co-morbidity symptoms are monitored and maintained or improved  3/3/2023 1257 by Amandeep Dinh RN  Outcome: Progressing  3/2/2023 2303 by Nadya Mccoy RN  Outcome: Progressing  Flowsheets (Taken 3/2/2023 2120)  Care Plan - Patient's Chronic Conditions and Co-Morbidity Symptoms are Monitored and Maintained or Improved: Monitor and assess patient's chronic conditions and comorbid symptoms for stability, deterioration, or improvement     Problem: Skin/Tissue Integrity  Goal: Absence of new skin breakdown  Description: 1. Monitor for areas of redness and/or skin breakdown  2. Assess vascular access sites hourly  3. Every 4-6 hours minimum:  Change oxygen saturation probe site  4.   Every 4-6 hours:  If on nasal continuous positive airway pressure, respiratory therapy assess nares and determine need for appliance change or resting period.   3/3/2023 1257 by Kayli Rojo RN  Outcome: Progressing  3/2/2023 2303 by Jhon Kate RN  Outcome: Progressing

## 2023-03-03 NOTE — PROGRESS NOTES
OCCUPATIONAL THERAPY  INPATIENT REHAB TREATMENT NOTE  [facility]      NAME: Garth Rowe  : 1935 (87 y.o.)  MRN: [id number]  CODE STATUS: Full Code  [facility]    Date of Service: 3/3/2023    Referring Physician: Dr. Chase  Rehab Diagnosis: Impaired mobility and ADL's due to severe pulmonary debility    Restrictions  Restrictions/Precautions  Restrictions/Precautions: Fall Risk         Position Activity Restriction  Other position/activity restrictions: pleurx on L side    Patient's date of birth confirmed: Yes    SAFETY:  Safety Devices  Safety Devices in place: Yes  Type of devices: All fall risk precautions in place    SUBJECTIVE:  Subjective: \" This is tough.\"    Pain at start of treatment: Yes: 8/10    Pain at end of treatment: Yes: 6/10    Location: anterior L shoulder through bicep area  Nursing notified: No  Intervention: Cold applied    COGNITION:  Orientation  Overall Orientation Status: Within Functional Limits  Orientation Level: Oriented X4  Cognition  Overall Cognitive Status: WFL    OBJECTIVE:     Pt completed green putty and x 15 bead retrieval activity seated with Sup.  Provided pt with instruction to task with demonstration.  Pt was able to manipulate putty between BUE hands by using finger tips and full grasp with in hand manipulation to pull/push beads out until all beads were out of the putty.  Pt demo mod difficulty with task d/t resistance of putty and LUE hand impaired sensation/mobility.  Pt observed to have difficulty initially holding onto beads when pulling them out, but then was able to manipulate and place in container as instructed.  Pt balled up putty and placed back into container with mod difficulty and pressed into container, and placed lid on it.  Pt counted beads 3x's to make sure he had picked them all out.  Pt completed this task to improve fine motor skills in order to improve functional self care skills and decrease need for  assistance. Feeding  Assistance Level: Set-up  Skilled Clinical Factors: demo slight difficulty getting lid off cup and cup filled very full. Assisted pt with task to decrease risk of spilling beverage. ASSESSMENT:  Assessment: Pt a little more tired today than yesterday. States he did \"not sleep well. \" [place]. Activity Tolerance: Patient tolerated treatment well      PLAN OF CARE:  Balance training, Functional mobility training, Strengthening, ROM, Endurance training, Safety education & training, Patient/Caregiver education & training, Equipment evaluation, education, & procurement, Home management training, Self-Care / ADL, Coordination training  Continue OT POC until discharge    Patient goals : to get stronger and possibly return to driving  Time Frame for Long Term Goals : within 1.5-2wks pt will demosntrate progress in the following areas to achieve specific LTG's listed in the initial eval  Long Term Goal 1: improve overall strength/endurance for functional tasks. Long Term Goal 2: improve independence with self care  Long Term Goal 3: improve sitting/standing balance for ADL tasks  Long Term Goal 4: improve functional mobility with LRD for safe item transport/retrieval  Long Term Goal 5: improve FMC to independently manipulate fasteners        Therapy Time:   Individual Group Co-Treat   Time In 1136       Time Out 1210         Minutes 34               ADL/IADL training: 10 minutes  Therapeutic activities: 24 minutes     Electronically signed by:     HERMAN Choa,   3/3/2023, 1:00 PM

## 2023-03-03 NOTE — PROGRESS NOTES
Warfarin Dosing - Pharmacy Consult Note  Consulting Provider: EDELMIRA Irizarry - CNP    Indication:  Atrial Fibrillation  Warfarin Dose prior to admission: 4 mg T/TH/Sat/Sun, 5 mg M/W/F   Concurrent anticoagulants/antiplatelets: aspirin 81 mg  Significant Drug Interactions: No obvious interactions  Recent Labs     03/01/23  0542 03/02/23  0503 03/03/23  0514   INR 3.2 3.4 3.4     Recent warfarin administrations                     warfarin (COUMADIN) tablet 4 mg (mg) 4 mg Given 02/28/23 1721                   Date   INR    Dose  2/24       1.6        5 mg   2/25       1.9        4 mg   2/26       2.5        2 mg  2/27       2.9        2.5 mg   2/28       2.8         4 mg  3/1         3.2        HOLD  3/2         3.4        HOLD  3/3         3.4        HOLD    Assessment/Plan  (Goal INR: 2 - 3)  INR supratherapeutic today at 3.4  Will hold warfarin again today    Active problem list reviewed. INR orders are placed. Chart reviewed for pertinent labs, drug/diet interactions, and past doses. Documentation of patient's clinical condition was reviewed. Pharmacy Dosing:  Pharmacy will continue to follow.      Malik Jiménez, PharmD  3/3/2023 12:41 PM

## 2023-03-03 NOTE — PROGRESS NOTES
Physical Therapy Rehab Treatment Note  Facility/Department: [facility]  Room: O9/W551-48       NAME: Julien Yip  : 1935 (82 y.o.)  MRN: [id number]  CODE STATUS: Full Code    Date of Service: 3/3/2023     Restrictions:  Restrictions/Precautions: Fall Risk  Position Activity Restriction  Other position/activity restrictions: pleurx on L side    SUBJECTIVE:   Subjective: I am doing good except for this cough    Pain  Pain: 6-7 L shoulder Achy sore pre and post session. Patient medicated prior to session    OBJECTIVE:     Roll Left  Assistance Level: Stand by assist  Roll Right  Assistance Level: Stand by assist  Sit to Supine  Assistance Level: Stand by assist  Supine to Sit  Assistance Level: Stand by assist  Scooting  Assistance Level: Stand by assist    Sit to Stand  Assistance Level: Stand by assist;Supervision  Skilled Clinical Factors: Multiple attempts to rise  Stand to Sit  Assistance Level: Supervision  Bed To/From Chair  Assistance Level: Stand by assist;Supervision    Ambulation  Surface: Carpet  Device: Rolling walker  Distance: 50'  Activity: Within Unit  Activity Comments: Slow jefe increased lateral excursion, decreased heel strike L>R  Additional Factors: Verbal cues  Assistance Level: Supervision  Gait Deviations: Slow jefe;Decreased heel strike left  Skilled Clinical Factors: Completed with 2L O2 donned mild SOB recovers quickly    Neuromuscular Education  NDT Treatment: Standing  Facilitation techniques: Standing tolerance with UUE support on ww x 6 minutes    ASSESSMENT/PROGRESS TOWARDS GOALS:   Assessment  Assessment: Patient making progress towards bed mobility and transfer goals requiring decreased effort to complete. Patient continues to exhibit SOB with all mobility requires seated rest break to recover.   Activity Tolerance: Patient tolerated treatment well  Discharge Recommendations: Continue to assess pending progress    Goals:  Long Term Goals  Long Term Goal 1: indep bed mobility  Long Term Goal 2: Pt will demonstrate transfers supervision with safest AD  Long Term Goal 3: Pt will demonstrate amb >/= 150ft supervision with safest AD  Long Term Goal 4: Pt will demonstrate stair negotiation 3 steps without rails with safest AD SBA  Long Term Goal 5: Pt will demonstrate pantoja balance assessment >/= 45/56 for decreased risk for falls. Patient Goals   Patient Goals : Randolphsolo Rodriguez out of chairs easier. drive a car.  stand for longer time. \"    PLAN OF CARE/Safety:   Safety Devices  Type of Devices: All fall risk precautions in place; Chair alarm in place      Therapy Time:   Individual   Time In 0900   Time Out 0930   Minutes 30     Minutes: 30  Transfer/Bed mobility training: 15  Gait trainin  Neuro re education: [id number] Reginald Drake PTA, 23 at 11:33 AM

## 2023-03-04 LAB
INR BLD: 3
PROTHROMBIN TIME: 31 SEC (ref 12.3–14.9)

## 2023-03-04 PROCEDURE — 6370000000 HC RX 637 (ALT 250 FOR IP): Performed by: REGISTERED NURSE

## 2023-03-04 PROCEDURE — 6370000000 HC RX 637 (ALT 250 FOR IP): Performed by: FAMILY MEDICINE

## 2023-03-04 PROCEDURE — 36415 COLL VENOUS BLD VENIPUNCTURE: CPT

## 2023-03-04 PROCEDURE — 6370000000 HC RX 637 (ALT 250 FOR IP): Performed by: PHYSICAL MEDICINE & REHABILITATION

## 2023-03-04 PROCEDURE — 85610 PROTHROMBIN TIME: CPT

## 2023-03-04 PROCEDURE — 97116 GAIT TRAINING THERAPY: CPT

## 2023-03-04 PROCEDURE — 97535 SELF CARE MNGMENT TRAINING: CPT

## 2023-03-04 PROCEDURE — 1180000000 HC REHAB R&B

## 2023-03-04 PROCEDURE — 2700000000 HC OXYGEN THERAPY PER DAY

## 2023-03-04 PROCEDURE — 6370000000 HC RX 637 (ALT 250 FOR IP): Performed by: INTERNAL MEDICINE

## 2023-03-04 RX ORDER — GUAIFENESIN/DEXTROMETHORPHAN 100-10MG/5
10 SYRUP ORAL EVERY 4 HOURS PRN
Status: DISCONTINUED | OUTPATIENT
Start: 2023-03-04 | End: 2023-03-13 | Stop reason: HOSPADM

## 2023-03-04 RX ORDER — WARFARIN SODIUM 2 MG/1
2 TABLET ORAL
Status: COMPLETED | OUTPATIENT
Start: 2023-03-04 | End: 2023-03-04

## 2023-03-04 RX ORDER — HYDROCORTISONE ACETATE 25 MG/1
25 SUPPOSITORY RECTAL 2 TIMES DAILY PRN
Status: DISCONTINUED | OUTPATIENT
Start: 2023-03-04 | End: 2023-03-13 | Stop reason: HOSPADM

## 2023-03-04 RX ORDER — POTASSIUM CHLORIDE 750 MG/1
10 CAPSULE, EXTENDED RELEASE ORAL DAILY
Status: DISCONTINUED | OUTPATIENT
Start: 2023-03-04 | End: 2023-03-13 | Stop reason: HOSPADM

## 2023-03-04 RX ADMIN — Medication 2000 UNITS: at 17:24

## 2023-03-04 RX ADMIN — ROSUVASTATIN CALCIUM 10 MG: 5 TABLET, FILM COATED ORAL at 09:34

## 2023-03-04 RX ADMIN — OXYCODONE 10 MG: 5 TABLET ORAL at 21:07

## 2023-03-04 RX ADMIN — GUAIFENESIN SYRUP AND DEXTROMETHORPHAN 10 ML: 100; 10 SYRUP ORAL at 17:24

## 2023-03-04 RX ADMIN — POLYVINYL ALCOHOL 1 DROP: 14 SOLUTION/ DROPS OPHTHALMIC at 09:35

## 2023-03-04 RX ADMIN — FUROSEMIDE 20 MG: 20 TABLET ORAL at 09:35

## 2023-03-04 RX ADMIN — GUAIFENESIN 600 MG: 600 TABLET ORAL at 21:10

## 2023-03-04 RX ADMIN — GUAIFENESIN 600 MG: 600 TABLET ORAL at 09:34

## 2023-03-04 RX ADMIN — GUAIFENESIN SYRUP AND DEXTROMETHORPHAN 10 ML: 100; 10 SYRUP ORAL at 11:01

## 2023-03-04 RX ADMIN — WARFARIN SODIUM 2 MG: 2 TABLET ORAL at 17:24

## 2023-03-04 RX ADMIN — METOPROLOL TARTRATE 25 MG: 25 TABLET, FILM COATED ORAL at 21:10

## 2023-03-04 RX ADMIN — HYDROCORTISONE ACETATE 25 MG: 25 SUPPOSITORY RECTAL at 13:48

## 2023-03-04 RX ADMIN — ASPIRIN 81 MG: 81 TABLET, COATED ORAL at 09:34

## 2023-03-04 RX ADMIN — METOPROLOL TARTRATE 25 MG: 25 TABLET, FILM COATED ORAL at 09:35

## 2023-03-04 RX ADMIN — GUAIFENESIN 600 MG: 600 TABLET ORAL at 21:06

## 2023-03-04 RX ADMIN — Medication 5 MG: at 21:20

## 2023-03-04 RX ADMIN — POTASSIUM CHLORIDE 10 MEQ: 750 CAPSULE, EXTENDED RELEASE ORAL at 14:08

## 2023-03-04 RX ADMIN — Medication 100 MG: at 09:34

## 2023-03-04 ASSESSMENT — PAIN DESCRIPTION - FREQUENCY
FREQUENCY: CONTINUOUS
FREQUENCY: CONTINUOUS

## 2023-03-04 ASSESSMENT — PAIN SCALES - GENERAL
PAINLEVEL_OUTOF10: 7
PAINLEVEL_OUTOF10: 2

## 2023-03-04 ASSESSMENT — PAIN DESCRIPTION - ORIENTATION
ORIENTATION: LEFT
ORIENTATION: RIGHT

## 2023-03-04 ASSESSMENT — PAIN DESCRIPTION - PAIN TYPE
TYPE: CHRONIC PAIN
TYPE: CHRONIC PAIN

## 2023-03-04 ASSESSMENT — PAIN DESCRIPTION - LOCATION
LOCATION: SHOULDER
LOCATION: SHOULDER

## 2023-03-04 ASSESSMENT — PAIN DESCRIPTION - DESCRIPTORS: DESCRIPTORS: SORE

## 2023-03-04 NOTE — PLAN OF CARE
Problem: Discharge Planning  Goal: Discharge to home or other facility with appropriate resources  3/4/2023 1048 by Sangeeta Arellano RN  Outcome: Progressing  3/3/2023 2303 by Rosalind Jackson RN  Outcome: Progressing     Problem: Pain  Goal: Verbalizes/displays adequate comfort level or baseline comfort level  3/4/2023 1048 by Sangeeta Arellano RN  Outcome: Progressing  3/3/2023 2303 by Rosalind Jackson RN  Outcome: Progressing     Problem: Safety - Adult  Goal: Free from fall injury  3/4/2023 1048 by Sangeeta Arellano RN  Outcome: Progressing  3/3/2023 2303 by Rosalind Jackson RN  Outcome: Progressing     Problem: ABCDS Injury Assessment  Goal: Absence of physical injury  3/4/2023 1048 by Sangeeta Arellano RN  Outcome: Progressing  3/3/2023 2303 by Rosalind Jackson RN  Outcome: Progressing     Problem: Chronic Conditions and Co-morbidities  Goal: Patient's chronic conditions and co-morbidity symptoms are monitored and maintained or improved  3/4/2023 1048 by Sangeeta Arellano RN  Outcome: Progressing  3/3/2023 2303 by Rosalind Jackson RN  Outcome: Progressing     Problem: Skin/Tissue Integrity  Goal: Absence of new skin breakdown  Description: 1. Monitor for areas of redness and/or skin breakdown  2. Assess vascular access sites hourly  3. Every 4-6 hours minimum:  Change oxygen saturation probe site  4. Every 4-6 hours:  If on nasal continuous positive airway pressure, respiratory therapy assess nares and determine need for appliance change or resting period.   3/4/2023 1048 by Sangeeta Arellano RN  Outcome: Progressing  3/3/2023 2303 by Rosalind Jackson RN  Outcome: Progressing

## 2023-03-04 NOTE — PROGRESS NOTES
Progress Note    Date:3/4/2023       [facility]  Patient Name:Garth Leahy     YOB: 1935     Age:87 y.o. Assessment        Hospital Problems             Last Modified POA    * (Principal) Impaired mobility and activities of daily living dt CP debility 2/26/2023 Yes    Mixed hyperlipidemia 2/26/2023 Yes    Gastritis without bleeding 2/26/2023 Yes    Pneumonia of both lungs due to infectious organism 2/26/2023 Yes    Acute kidney failure ([address] 2/26/2023 Yes    Spinal stenosis of lumbar region with neurogenic claudication 2/26/2023 Yes    Atherosclerosis of native artery of both lower extremities with intermittent claudication ([address] 2/26/2023 Yes    Atrial fibrillation ([address] 2/26/2023 Yes    Overview Signed 2/2/2021  3:01 PM by EDELMIRA Marin - CNP     Past cardioversion         Venous insufficiency 2/26/2023 Yes    Balance disorder 2/26/2023 Yes       Plan:      *Impaired mobility and ADLs due to CP debility-managed by Dr. Lola Fitch     *Loculated pleural effusion-ID following on meropenem; Dr. Horace Schumacher . Second Pleural fluid  culture negative; meropenem D/C'd. 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 ECG on 2/27/2023 atrial paced normal sinus rhythm on aspirin, metoprolol for rate control, and Coumadin pharmacy dose. *HTN-current BP within normal range;on metoprolol     *CHF-left ventricular ejection fraction 58%; on Lasix     *Hyperlipidemia-Crestor 10 mg daily     Hospital medicine managing acute needs. Patient will need to follow-up PCP for chronic disease management. Time spent with patient 35 minutes. Greater than 70% of time  spent focused exclusively on this patient ,reviewing  chart,  reconciling medications, &  answering questions with patient and discussing plan. Subjective   Interval History Status: .  Patient is a 72-year-old male with a pertinent past medical history of A-fib, CKD, mesothelioma, sick sinus syndrome admitted to acute rehab services for PT and OT. Patient denies chest pain, palpitations, headache, dizziness, shortness of breath, cough, fever, chills, N/V/D, and changes in appetite. Review of Systems   12 point review of systems reviewed with patient with pertinent positives listed in HPI otherwise ROS negative    Medications   Scheduled Meds:    Vitamin D  2,000 Units Oral Dinner    cyanocobalamin  1,000 mcg IntraMUSCular Weekly    coenzyme Q10  100 mg Oral Daily    warfarin placeholder: dosing by pharmacy   Other RX Placeholder    aspirin  81 mg Oral Daily    furosemide  20 mg Oral Daily    guaiFENesin  600 mg Oral BID    melatonin  5 mg Oral Nightly    metoprolol tartrate  25 mg Oral BID    naloxegol  25 mg Oral QAM    rosuvastatin  10 mg Oral Daily     Continuous Infusions:   PRN Meds: lidocaine, polyvinyl alcohol, bisacodyl, sodium phosphate, acetaminophen, ipratropium-albuterol, ondansetron **OR** ondansetron, oxyCODONE **OR** oxyCODONE    Past History    Past Medical History:   has a past medical history of A-fib ([facility] [address] Arthritis, Baker's cyst of knee, left, Cancer ([facility] [address] Cerebral artery occlusion with cerebral infarction ([facility] [address] Congestive heart failure ([address] Coronary artery disease, HTN (hypertension), Hx of blood clots, Hyperlipidemia, Pacemaker, Polycythemia, and Torn meniscus. Social History:   reports that he quit smoking about 54 years ago. His smoking use included cigarettes. He has a 5.00 pack-year smoking history. He has never used smokeless tobacco. He reports current alcohol use. He reports that he does not use drugs.      Family History:   Family History   Problem Relation Age of Onset    Heart Attack Mother     Other Mother          in MVA    Other Father          at age 80    Other Sister          as infant    Cancer Brother     Other Brother         unknown medical hx    Other Brother          at age 61 due to accident    Cancer Daughter         colon & lung cancer    Colon Cancer Daughter     No Known Problems Son     Colon Cancer Other     Breast Cancer Other        Physical Examination      Vitals:  /73   Pulse 68   Temp 98.9 °F (37.2 °C) (Oral)   Resp 14   Ht 5' 7\" (1.702 m)   Wt 256 lb 3.2 oz (116.2 kg)   SpO2 98%   BMI 40.13 kg/m²   Temp (24hrs), Av.9 °F (37.2 °C), Min:98.9 °F (37.2 °C), Max:98.9 °F (37.2 °C)      I/O (24Hr): Intake/Output Summary (Last 24 hours) at 3/4/2023 8427  Last data filed at 3/3/2023 1837  Gross per 24 hour   Intake --   Output 200 ml   Net -200 ml         CONSTITUTIONAL:  Awake, alert, no apparent distress, and appears stated age  EYES: pupils equal, round and reactive to light   NECK:  Supple   LUNGS:  No increased work of breathing, good air exchange, clear to auscultation bilaterally, no crackles or wheezing  CARDIOVASCULAR:  Normal apical impulse, regular rate and rhythm  ABDOMEN:  No scars, normal bowel sounds, soft, non-distended, non-tender  MUSCULOSKELETAL:  There is no redness, warmth, or swelling of the joints. Full range of motion noted  NEUROLOGIC:  Awake, alert. No focal deficits noted  SKIN:  No bruising or bleeding, normal skin color, texture, turgor, no redness, warmth, or swelling, no rashes, and no lesions    Labs/Imaging/Diagnostics   Labs:  CBC:No results for input(s): WBC, RBC, HGB, HCT, MCV, RDW, PLT in the last 72 hours. CHEMISTRIES:No results for input(s): NA, K, CL, CO2, BUN, CREATININE, GLUCOSE, CA, PHOS, MG in the last 72 hours. PT/INR:  Recent Labs     23  0503 23  0514   PROTIME 34.6* 34.5*   INR 3.4 3.4     APTT:No results for input(s): APTT in the last 72 hours. LIVER PROFILE:No results for input(s): AST, ALT, BILIDIR, BILITOT, ALKPHOS in the last 72 hours. Imaging Last 24 Hours:  No results found.     Electronically signed by EDELMIRA Carcamo CNP on 3/4/23 at 9:22 AM EST

## 2023-03-04 NOTE — FLOWSHEET NOTE
Patient assessment is complete. Patient had a large bowel movement today and his hemorrhoids are active. Rn will get a suppository for him.

## 2023-03-04 NOTE — PROGRESS NOTES
Assessment completed. VSS. LBM 3/3. Patient reported 4/10 left shoulder pain. Medicated with prn hemanth, 5mg. Pleurx drained by prior nursing staff- 200ml of serosanguinous drainage/ orange colored. INR  3.4- no coumadin, pharmacy dosing. Patient's CPAP on. No distress noted. Call light within reach and bed alarm activated.   Electronically signed by Casimiro Fuentes RN on 3/3/2023 at 11:22 PM

## 2023-03-04 NOTE — PLAN OF CARE
Problem: Discharge Planning  Goal: Discharge to home or other facility with appropriate resources  3/3/2023 2303 by Kody Cantu RN  Outcome: Progressing     Problem: Pain  Goal: Verbalizes/displays adequate comfort level or baseline comfort level  3/3/2023 2303 by Kody Cantu RN  Outcome: Progressing     Problem: Safety - Adult  Goal: Free from fall injury  3/3/2023 2303 by Kody Cantu RN  Outcome: Progressing     Problem: ABCDS Injury Assessment  Goal: Absence of physical injury  3/3/2023 2303 by Kody Cantu RN  Outcome: Progressing     Problem: Chronic Conditions and Co-morbidities  Goal: Patient's chronic conditions and co-morbidity symptoms are monitored and maintained or improved  3/3/2023 2303 by Kody Cantu RN  Outcome: Progressing     Problem: Skin/Tissue Integrity  Goal: Absence of new skin breakdown  Description: 1. Monitor for areas of redness and/or skin breakdown  2. Assess vascular access sites hourly  3. Every 4-6 hours minimum:  Change oxygen saturation probe site  4. Every 4-6 hours:  If on nasal continuous positive airway pressure, respiratory therapy assess nares and determine need for appliance change or resting period.   3/3/2023 2303 by Kody Cnatu RN  Outcome: Progressing

## 2023-03-04 NOTE — PROGRESS NOTES
Subjective: The patient complains of moderate to severe acute on chronic progressive fatigue and  PRIETO partially relieved by rest, medications, PT,  OT,   SLP and rest and exacerbated by recent illness  . Patient had initially been treated at [facility] [place] where a nerve stimulator placement was attempted but delayed due to high INR. Once the procedure was finally initiated it was aborted because of possible CSF leak. Patient became shortness of breath postprocedure but this was found to be largely unrelated to the procedure but related to a large loculated pleural effusion. Patient was transferred to [facility] for thoracotomy and VATS procedure performed by Dr. Lesley Grace. As noted patient had a chest tube inserted by Dr. Lesley Grace on 2/18/2023. He has been followed by thoracic surgery pulmonology oncology GI and cardiology as well as the hospitalist.    rn    Signed                                                                                        Assessment completed. VSS. LBM 3/3. Patient reported 4/10 left shoulder pain. Medicated with prn hemanth, 5mg. Pleurx drained by prior nursing staff- 200ml of serosanguinous drainage/ orange colored. INR  3.4- no coumadin, pharmacy dosing. Patient's CPAP on. No distress noted. Call light within reach and bed alarm activated. Electronically signed by Kody Cantu RN on 3/3/2023 at 11:22 PM           ROS x10: The patient also complains of severely impaired mobility and activities of daily living. Otherwise no new problems with vision, hearing, nose, mouth, throat, dermal, cardiovascular, GI, , pulmonary, musculoskeletal, psychiatric or neurological. See also Acute Rehab PM&R H&P.        Vital signs:  /73   Pulse 68   Temp 98.9 °F (37.2 °C) (Oral)   Resp 14   Ht 5' 7\" (1.702 m)   Wt 256 lb 3.2 oz (116.2 kg)   SpO2 98%   BMI 40.13 kg/m²   I/O:   PO/Intake:  fair PO intake,   Reg diet    Bowel:   continent LBM 3/1  Bladder: continent    bishop  General:  Patient is well developed,   adequately nourished, and    well kempt. HEENT:    Pupils equal, hearing intact to loud voice, external inspection of ear and nose benign. Inspection of lips, tongue and gums  thrush    Musculoskeletal: No significant change in strength or tone. All joints stable. Inspection and palpation of digits and nails show no clubbing, cyanosis or inflammatory conditions. Neuro/Psychiatric: Affect: flat but pleasant. Alert and oriented to person, place and situation with  min cues. No significant change in deep tendon reflexes or sensation  Lungs:  Diminished, CTA-B. Respiration effort is   normal at rest.   Pleurx cath dressing D&I L Side. Heart:   S1 = S2,   RRR. Abdomen:  Soft, non-tender, no enlargement of liver or spleen. Extremities:   mod lower extremity edema    Skin:   Intact to general survey,  Pleurx cath dressing D&I L Side. Rehabilitation:  Physical Therapy:   Bed mobility:  Bed mobility  Rolling to Left: Minimal assistance (02/26/23 1242)  Rolling to Right: Minimal assistance (02/26/23 1242)  Supine to Sit: Moderate assistance (02/26/23 1242)  Sit to Supine: Moderate assistance (02/26/23 1242)  Bed Mobility Comments: HOB flat; cues to avoid breath holding (02/26/23 1242)  Roll Left  Assistance Level: Stand by assist (03/03/23 1120)  Skilled Clinical Factors: With use of bed rail (03/02/23 1317)  Roll Right  Assistance Level: Stand by assist (03/03/23 1120)  Skilled Clinical Factors: With use of bed rail (03/02/23 1317)  Sit to Supine  Assistance Level: Stand by assist (03/03/23 1120)  Skilled Clinical Factors: Increased time to complete (03/02/23 1317)  Supine to Sit  Assistance Level: Stand by assist (03/03/23 1120)  Skilled Clinical Factors: Increased time and effort (03/02/23 1317)  Scooting  Assistance Level: Stand by assist (03/03/23 1120)  Transfers:  Transfers  Sit to Stand:  Moderate Assistance (02/26/23 1243)  Stand to Sit: Moderate Assistance (02/26/23 1243)  Comment: poor use of L LE during sit to stand; [employer] used (02/26/23 1243)  Sit to Stand  Assistance Level: Stand by assist;Supervision (03/04/23 1511)  Skilled Clinical Factors: cont multiple attempts to stand (03/04/23 1511)  Stand to Sit  Assistance Level: Supervision (03/04/23 1511)  Skilled Clinical Factors: completed safely without vc (03/04/23 1511)  Bed To/From Chair  Technique: Stand step (03/02/23 1317)  Assistance Level: Stand by assist;Supervision (03/03/23 1120)  Skilled Clinical Factors: Increased time to complete requires use of UE's to complete (03/02/23 1317)  Car Transfer  Assistance Level: Minimal assistance (02/27/23 0942)  Skilled Clinical Factors: [place] to rise from low level chair able to lift BLE in and out of car (02/27/23 0942)  Gait:   Ambulation  Surface: Level tile (03/02/23 1406)  Device: Rolling Walker (03/02/23 1406)  Other Apparatus: O2 (03/02/23 1406)  Assistance: Contact guard assistance;Stand by assistance (03/02/23 1406)  Quality of Gait: cues for posture and upward gaze, decreased step length and height (03/02/23 1406)  Gait Deviations: Slow Jefe; Increased MENA; Decreased step length;Decreased step height (02/26/23 1244)  Distance: 100' (03/02/23 1406)  Ambulation  Surface: Carpet (03/04/23 1517)  Device: Rolling walker (03/04/23 1517)  Distance: 75 feet (03/04/23 1517)  Activity: Within Unit (03/04/23 1517)  Activity Comments: decresaed hs bilat, decreased step length, vc for upward gaze (03/04/23 1517)  Additional Factors: Verbal cues (03/04/23 1517)  Assistance Level: Supervision (03/04/23 1517)  Gait Deviations: Slow jefe;Decreased heel strike left;Decreased step length bilateral (03/04/23 1517)  Skilled Clinical Factors: 2 liters o2 (03/04/23 1517)  Stairs:  Stairs/Curb  Stairs?: No (03/02/23 1406)  Stairs  Stair Height: 6'' (03/03/23 1615)  Device: Bilateral handrails (03/03/23 1615)  Number of Stairs: 4 (03/03/23 1615)  Additional Factors: Verbal cues; Non-reciprocal going down;Reciprocal going up (03/03/23 1615)  Assistance Level: Stand by assist (03/02/23 1214)  Skilled Clinical Factors: Completed with 2L 93% post stair negotiation improved 2 96% with seated rest break (03/02/23 1214)  W/C mobility:       Occupational Therapy:   Hand Dominance: Right  ADL  Feeding: Stand by assistance (02/26/23 1201)  Grooming: Minimal assistance (02/26/23 1201)  UE Bathing: Minimal assistance (02/26/23 1201)  LE Bathing: Maximum assistance (02/26/23 1201)  LE Bathing Skilled Clinical Factors: periare only per pt request (02/26/23 1201)  UE Dressing: Moderate assistance (02/26/23 1201)  LE Dressing: Maximum assistance (02/26/23 1201)  Toileting: Maximum assistance (02/26/23 1201)  Additional Comments: sponge bath ADL completed sitting EOB and patially on toilet. (02/26/23 1201)  Toilet Transfers  Toilet - Technique: Ambulating (02/26/23 1207)  Equipment Used: Grab bars (02/26/23 1207)  Toilet Transfer: Moderate assistance (02/26/23 1207)  Toilet Transfers Comments: CGA on ModA off (02/26/23 1207)          Speech Therapy:      Comprehension:  (AUditory comprehension is [facility])  Verbal Expression:  (Mild to moderate deficits noted with divergent and convergent naming tasks. Min assist required to improve naming skills.)  Diet/Swallow:           Compensatory Swallowing Strategies : Alternate solids and liquids, Eat/Feed slowly, Upright as possible for all oral intake, Small bites/sips          Lab/X-ray studies reviewed, analyzed and discussed with patient and staff:   Recent Results (from the past 24 hour(s))   Protime-INR    Collection Time: 03/04/23  9:26 AM   Result Value Ref Range    Protime 31.0 (H) 12.3 - 14.9 sec    INR 3.0        XR ABDOMEN   2/18/2023   1. Complete opacification of left hemithorax compatible with some combination of pleural effusion and atelectasis. Secondary obscuration of left heart border. 2.  Nonobstructive bowel gas pattern.   No acute abdominal disease identified. 3.  Probable left renal stone. CT CHEST   2/23/2023 : Mediastinum: Thyroid is homogeneous in appearance. Vascular calcifications seen within the coronary vessels. Cardiac chambers are mildly enlarged. Pacer leads in satisfactory position. Moderate-sized pericardial effusion. Bulky adenopathy identified in the left hilar region likely reactive. Small lymph nodes seen scattered throughout the mediastinum likely reactive. Atherosclerotic disease seen diffusely throughout the thoracic aorta. Lungs/pleura: There is small chest tube identified in place on the left with small left pleural effusion. Airspace consolidation seen within the left upper and lower lung fields suggesting superimposed infiltrate such as pneumonia. Mild increased markings identified at the right lung base to suggest atelectatic change. Trace pneumothorax identified anteriorly. Upper Abdomen: Stones in the gallbladder. Small hiatal hernia. Soft Tissues/Bones: Multilevel degenerative changes seen within the spine. No acute chest wall abnormality. Indwelling chest tube identified on the left with small left pleural effusion and trace anterior pneumothorax. Consolidation seen in airspace disease throughout the left upper and lower lobes to suggest a multifocal superimposed pneumonia. Soft tissue density seen in the hilar region to suggest possible reactive adenopathy. Moderate-sized pericardial effusion. Minimal atelectatic changes seen at the right lung base. Incidental stones in the gallbladder with small hiatal hernia. XR CHEST   2/19/2023   1. Minimal partial clearing of the left lung. The previously noted left pleural effusion is smaller   2. Stable position of the left chest tube   3. The right lung is clear. XR CHEST  2/18/2023   1. Apparent interval placement of left chest tube catheter. No pneumothorax. 2.  Persistent opacification of the left hemithorax.      XR CHEST 2023   1. Near complete whiteout of the left hemithorax likely represent a combination of partial collapse of the left lung and large pleural effusion   2. Cardiomegaly   3. The right lung is grossly clear.     US DUP UPPER EXTREMITY RIGHT VENOUS  2023   No evidence of DVT.     FLUORO FOR SURGICAL PROCEDURES  2023  12 spot images of the lumbar spine were obtained.     Intraprocedural fluoroscopic spot images as above.  See separate procedure report for more information.     EGD 2023  [facility] Patient: COSME LEMON MRN: [id number] : 1935 Account: Gender: Male Age: 87 Years Procedure: Upper GI endoscopy Date: 2023 Attending Physician: Chance Chen Indications:        -  Anemia        -  Melena Medications:        -  Monitored Anesthesia Care Complications:        -  No immediate complications. Estimated Blood Loss:        -  Estimated blood loss: none. Procedure:        - The Gastroscope was introduced through the mouth and advanced to the second           part of the duodenum.        -  The patient tolerated the procedure well. Findings:        -  A 2 cm hernia was found.        -  Esophagogastric landmarks were identified: the gastroesophageal junction           was found at 36 cm, the lower esophageal sphincter was found at 38 cm and the           upper extent of the gastric folds was found at 36 cm from the incisors.        -  Patchy moderate inflammation characterized by erythema was found in the           gastric antrum and in the gastric body.        -  The examined duodenum was normal.        -  The cardia and gastric fundus were normal on retroflexion.        - No old or fresh blood noted Impression:        -  2 cm hernia.        -  Esophagogastric landmarks identified.        -  Gastritis.        -  Normal examined duodenum.        -  No specimens collected.        - No old or fresh blood noted Recommendation:        -  Put patient on a clear liquid diet  starting today. -  Continue present medications. -     - 08908, Esophagogastroduodenoscopy, flexible, transoral; diagnostic,           including collection of specimen(s) by brushing or washing, when performed           (separate procedure) Diagnosis Code(s):        - D64.9, Anemia, unspecified        - K92.1, Melena (includes Hematochezia)        - K44.9, Diaphragmatic hernia without obstruction or gangrene        - K29.70, Gastritis, unspecified, without bleeding           Previous extensive, complex labs, notes and diagnostics reviewed and analyzed. ALLERGIES:    Allergies as of 02/25/2023 - Fully Reviewed 02/25/2023   Allergen Reaction Noted    Benadryl [diphenhydramine hcl] Anxiety 01/31/2012    Diphenhydramine Anxiety 05/09/2018      (please also verify by checking [facility])      Complex Physical Medicine & Rehab Issues Assess & Plan:   Severe abnormality of gait and mobility and impaired self-care and ADL's secondary to progressive cardiopulmonary debility. Functional and medical status reassessed regarding patients ability to participate in therapies and patient found to be able to participate in acute intensive comprehensive inpatient rehabilitation program including PT/OT to improve balance, ambulation, ADLs, and to improve the P/AROM. Therapeutic modifications regarding activities in therapies, place, amount of time per day and intensity of therapy made daily. In bed therapies or bedside therapies prn. Bowel and Bladder dysfunction  , Neurogenic bowel and bladder:  frequent toileting, ambulate to bathroom with assistance, check post void residuals. Check for C.difficile x1 if >2 loose stools in 24 hours, continue bowel & bladder program.  Monitor bowel and bladder function. Lactinex 2 PO every AC. MOM prn, Brown Bomb prn, Glycerin suppository prn, enema prn. Encourage therapy and nursing to co-treat and problem solve re continence.     Severe back pain, lumbar, as well as generalized OA pain: reassess pain every shift and prior to and after each therapy session, give prn Tylenol and consider scheduled Tylenol, modalities prn in therapy, masage, Lidoderm, K-pad prn. Consider scheduled AM pain meds. Skin healing  ears and breakdown risk:  continue pressure relief program.  Daily skin exams and reports from nursing. Fatigue due to nutritional and hydration deficiency: Add and titrate vitamin B12 vitamin D and CoQ10 continue to monitor I&Os, calorie counts prn, dietary consult prn. Add healthy snack at night. Acute episodic insomnia with situational adjustment disorder:  prn Ambien, monitor for day time sedation. Falls risk elevated:  patient to use call light to get nursing assistance to get up, bed and chair alarm. Elevated DVT risk: progressive activities in PT, continue prophylaxis JOB hose, elevation and meds-see MAR. Complex discharge planning: Discharge March 12, 2023 home with his wife and home health care. Weekly team meeting every Monday to re-assess progress towards goals, discuss and address social, psychological and medical comorbidities and to address difficulties they may be having progressing in therapy. Patient and family education is in progress. The patient is to follow-up with their family physician after discharge.         Complex Active General Medical Issues that complicate care Assess & Plan:    Mesothelioma with dry cough-titrate O2, aerosols, follow-up with Dr. Fede Quezada, drain pleural Dex daily versus 3 times a week per protocols from pulmonology and lung surgery consult  Mixed hyperlipidemia,  Atherosclerosis of native artery of both lower extremities with intermittent claudication, Atrial fibrillation, Venous insufficiency-Acute rehab to monitor heart rate and rhythm with the option of telemetry and the effects of chronotropic medication with respect to increasing physical activity and exercise in PT, OT, ADLs with medication titration to lowest effective dosing. Continue blood signs every shift focusing on heart rate, rhythm and blood pressure checks with orthostatic checks-monitoring the effect of exercise, therapy and posture. Consult hospitalist for backup medical and adjust/add medications (aspirin, Lasix, Lopressor, Crestor, Coumadin, bridging Lovenox). Monitor heart rate and blood pressure as well as medications effects on vital signs before during and after therapy with especial focus on preventing orthostasis and falls risk. Gastritis without bleeding-Elevate head of bed after meals, monitor stools for blood, lowest effective dose of PPI, consider Tums. Pneumonia of both lungs due to infectious organism-[facility] Acute rehab for endurance traing with Pulse Ox to monitoring oxygen saturation and heart rate with O2 titration to lowest effective dose. Pulse oximeter checks to shift and at HS to dose and titrate oxygen and aerosol treatments monitor for nocturnal hypoxemia, monitor vital signs, oxygen prn. Focus on energy conservation. eft a Pleurx tube in O2   drain  GERD-Elevate head of bed after meals, monitor stools for blood, lowest effective dose of PPI, consider Tums. Anticoagulation coumadinization for G-nwn-vjnshig pharmacist regarding INR management    Acute kidney failure-Eliminate toxic medications, monitor I's and O's focusing on urine output, recheck BMP. Spinal stenosis of lumbar region with neurogenic claudication    Balance disorder-focus on balance and therapy     Focus on emotional health and caregiver involvement in care.   Rule out COVID-19        Family with concerns outside of rehab focus, await specialist input  Cont care, no increased sob or pain   Stephane Bolton D.O., PM&R     Attending    [address]

## 2023-03-04 NOTE — PROGRESS NOTES
Warfarin Dosing - Pharmacy Consult Note  Consulting Provider: EDELMIRA Zuleta CNP    Indication:  Atrial Fibrillation  Warfarin Dose prior to admission: 4 mg T/TH/Sat/Sun, 5 mg M/W/F   Concurrent anticoagulants/antiplatelets: aspirin 81 mg  Significant Drug Interactions: No obvious interactions  Recent Labs     03/02/23  0503 03/03/23  0514 03/04/23  0926   INR 3.4 3.4 3.0     Recent warfarin administrations        No warfarin orders with administrations found. Orders not given:            warfarin placeholder: dosing by pharmacy                   Date   INR    Dose  2/24       1.6        5 mg   2/25       1.9        4 mg   2/26       2.5        2 mg  2/27       2.9        2.5 mg   2/28       2.8         4 mg  3/1         3.2        HOLD  3/2         3.4        HOLD  3/3         3.4        HOLD  3/4         3.0         2 mg    Assessment/Plan  (Goal INR: 2 - 3)  INR is therapeutic today at 3.0 today after holding for 3 days  Will dose at Warfarin 2 mg today    Active problem list reviewed. INR orders are placed. Chart reviewed for pertinent labs, drug/diet interactions, and past doses. Documentation of patient's clinical condition was reviewed. Pharmacy Dosing:  Pharmacy will continue to follow.      Marilou Olseno [facility]  Staff Pharmacist, [facility]  3/4/2023  1:09 PM

## 2023-03-04 NOTE — PROGRESS NOTES
Physical Therapy Rehab Treatment Note  Facility/Department: [facility]  Room: 69/I546-28       NAME: Laron Pollack  : 1935 (82 y.o.)  MRN: [id number]  CODE STATUS: Full Code    Date of Service: 3/4/2023       Restrictions:  Restrictions/Precautions: Fall Risk  Position Activity Restriction  Other position/activity restrictions: pleurx on L side    SUBJECTIVE:    Patient has no new reports    Pain   7/10 shoulder pain, RN aware patient has patches on shoulder    OBJECTIVE:      Sit to Stand  Assistance Level: Stand by assist;Supervision  Skilled Clinical Factors: cont multiple attempts to stand  Stand to Sit  Assistance Level: Supervision  Skilled Clinical Factors: completed safely without vc    Ambulation  Surface: Carpet  Device: Rolling walker  Distance: 75 feet  Activity: Within Unit  Activity Comments: decresaed hs bilat, decreased step length, vc for upward gaze  Additional Factors: Verbal cues  Assistance Level: Supervision  Gait Deviations: Slow jefe;Decreased heel strike left;Decreased step length bilateral  Skilled Clinical Factors: 2 liters o2      Standing static without ue support, with head turns and reach lat/fwd - sba- cga, varied brenda 0-1-2 ue support       Activity Tolerance  Activity Tolerance: Patient tolerated treatment well    ASSESSMENT/PROGRESS TOWARDS GOALS:   Assessment  Assessment: Patient challenged by standing balance and gait. Patient cont to have sob after exertion, recovers quickly  Activity Tolerance: Patient tolerated treatment well    Goals:  Long Term Goals  Long Term Goal 1: indep bed mobility  Long Term Goal 2: Pt will demonstrate transfers supervision with safest AD  Long Term Goal 3: Pt will demonstrate amb >/= 150ft supervision with safest AD  Long Term Goal 4: Pt will demonstrate stair negotiation 3 steps without rails with safest AD SBA  Long Term Goal 5: Pt will demonstrate pantoja balance assessment >/= 45/56 for decreased risk for falls.   Patient Goals   Patient Goals : \"get out of chairs easier. drive a car.  stand for longer time. \"    PLAN OF CARE/Safety:      All precautions in place    Therapy Time:   Individual   Time In 1430   Time Out 1500   Minutes 30     Minutes:30  Transfer/Bed mobility trainin  Gait training:10  Neuro re education:5  Therapeutic ex:10      Eladio Vale PTA, 23 at 3:22 PM

## 2023-03-04 NOTE — PROGRESS NOTES
Infectious Disease         History of Present Illness:  Patient is an 53-year-old male who was at [facility] for a possible nerve stimulator placement. CSF leak and the procedure was aborted per MR  Postoperatively patient developed worsening shortness of breath and was brought to [facility] for repeat evaluation he is found to have a large loculated pleural effusion. Patient was started on empiric antibiotic therapy at [facility] ICU and transferred to [facility] for thoracotomy/VATS decortication. Had fluid culture that was positive S epi from pleural effusion. He is s/p Pleurex cath placement. Still short of breath, but off oxygen      The gram stain was no organisms seen. The anaerobic bottle only was the one that turned positive but not until 2/21. Nothing was seen on the plate. Likely a skin contaminant. Review of Systems: All other ROS reviewed and are negative other than as stated in HPI      Physical Exam:     Blood pressure 125/61, pulse 72, temperature 98.9 °F (37.2 °C), temperature source Oral, resp. rate 19, height 5' 7\" (1.702 m), weight 256 lb 3.2 oz (116.2 kg), SpO2 93 %. General: Patient appears ok at the present time. NAD  Skin: no new rashes  HEENT:  Neck is supple, No subconjunctival hemorrhages, no oral exudates  Heart: S1 S2  Lungs:diminshed bases  Abdomen: soft, ND, NTTP,   Back :no CVA tenderness  Extrem: edema, non tender  Neuro exam: CN II-XII intact  Psych: cooperative    Labs: I have reviewed all lab results by electronic record, including most recent CBC, metabolic panel, and pertinent abnormalities were addressed from an infectious disease perspective. Trends are being monitored over time.    Lab Results   Component Value Date    WBC 8.5 02/26/2023    HGB 7.2 (L) 02/26/2023    HCT 21.8 (L) 02/26/2023    MCV 87.4 02/26/2023     02/26/2023     Lab Results   Component Value Date/Time     02/26/2023 04:49 AM    K 3.9 02/26/2023 04:49 AM    K 3.3 02/25/2023 05:16 AM     02/26/2023 04:49 AM    CO2 22 02/26/2023 04:49 AM    BUN 20 02/26/2023 04:49 AM    CREATININE 1.25 02/26/2023 04:49 AM    GLUCOSE 109 02/26/2023 04:49 AM    GLUCOSE 85 04/25/2012 01:30 PM    CALCIUM 8.1 02/26/2023 04:49 AM        Radiology:  I have reviewed imaging results per electronic record and most pertinent abnormalities are being addressed from an infectious disease standpoint. 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   Pneumonia    Coag negative staph after the Pleurx was placed could be contaminant ,was not reportedly consistent with empyema      PLAN:  Repeat culture of effusion was taken negative  Okay to stop antibiotics for now, monitor closely for development potential infectious process

## 2023-03-05 ENCOUNTER — APPOINTMENT (OUTPATIENT)
Dept: CT IMAGING | Age: 88
DRG: 947 | End: 2023-03-05
Attending: PHYSICAL MEDICINE & REHABILITATION
Payer: MEDICARE

## 2023-03-05 ENCOUNTER — APPOINTMENT (OUTPATIENT)
Dept: ULTRASOUND IMAGING | Age: 88
DRG: 947 | End: 2023-03-05
Attending: PHYSICAL MEDICINE & REHABILITATION
Payer: MEDICARE

## 2023-03-05 LAB
BODY FLUID CULTURE, STERILE: NORMAL
INR BLD: 3
PROTHROMBIN TIME: 31.6 SEC (ref 12.3–14.9)
SARS-COV-2, NAAT: NOT DETECTED

## 2023-03-05 PROCEDURE — 6370000000 HC RX 637 (ALT 250 FOR IP): Performed by: NURSE PRACTITIONER

## 2023-03-05 PROCEDURE — 85610 PROTHROMBIN TIME: CPT

## 2023-03-05 PROCEDURE — 1180000000 HC REHAB R&B

## 2023-03-05 PROCEDURE — 73200 CT UPPER EXTREMITY W/O DYE: CPT

## 2023-03-05 PROCEDURE — 6370000000 HC RX 637 (ALT 250 FOR IP): Performed by: PHYSICAL MEDICINE & REHABILITATION

## 2023-03-05 PROCEDURE — 87635 SARS-COV-2 COVID-19 AMP PRB: CPT

## 2023-03-05 PROCEDURE — 6370000000 HC RX 637 (ALT 250 FOR IP): Performed by: FAMILY MEDICINE

## 2023-03-05 PROCEDURE — 93971 EXTREMITY STUDY: CPT

## 2023-03-05 PROCEDURE — 2700000000 HC OXYGEN THERAPY PER DAY

## 2023-03-05 PROCEDURE — 6360000002 HC RX W HCPCS: Performed by: PHYSICAL MEDICINE & REHABILITATION

## 2023-03-05 PROCEDURE — 36415 COLL VENOUS BLD VENIPUNCTURE: CPT

## 2023-03-05 PROCEDURE — 6370000000 HC RX 637 (ALT 250 FOR IP): Performed by: REGISTERED NURSE

## 2023-03-05 RX ORDER — ACETAMINOPHEN 325 MG/1
650 TABLET ORAL 3 TIMES DAILY
Status: DISCONTINUED | OUTPATIENT
Start: 2023-03-05 | End: 2023-03-13 | Stop reason: HOSPADM

## 2023-03-05 RX ORDER — WARFARIN SODIUM 2 MG/1
2 TABLET ORAL
Status: COMPLETED | OUTPATIENT
Start: 2023-03-05 | End: 2023-03-05

## 2023-03-05 RX ADMIN — GUAIFENESIN 600 MG: 600 TABLET ORAL at 08:18

## 2023-03-05 RX ADMIN — OXYCODONE 10 MG: 5 TABLET ORAL at 08:17

## 2023-03-05 RX ADMIN — CYANOCOBALAMIN 1000 MCG: 1000 INJECTION, SOLUTION INTRAMUSCULAR; SUBCUTANEOUS at 08:18

## 2023-03-05 RX ADMIN — ACETAMINOPHEN 650 MG: 325 TABLET ORAL at 08:16

## 2023-03-05 RX ADMIN — METOPROLOL TARTRATE 25 MG: 25 TABLET, FILM COATED ORAL at 21:05

## 2023-03-05 RX ADMIN — WARFARIN SODIUM 2 MG: 2 TABLET ORAL at 17:08

## 2023-03-05 RX ADMIN — Medication 5 MG: at 21:03

## 2023-03-05 RX ADMIN — ROSUVASTATIN CALCIUM 10 MG: 5 TABLET, FILM COATED ORAL at 08:18

## 2023-03-05 RX ADMIN — OXYCODONE 5 MG: 5 TABLET ORAL at 21:03

## 2023-03-05 RX ADMIN — OXYCODONE 10 MG: 5 TABLET ORAL at 02:46

## 2023-03-05 RX ADMIN — GUAIFENESIN 600 MG: 600 TABLET ORAL at 21:03

## 2023-03-05 RX ADMIN — METOPROLOL TARTRATE 25 MG: 25 TABLET, FILM COATED ORAL at 08:18

## 2023-03-05 RX ADMIN — Medication 100 MG: at 08:17

## 2023-03-05 RX ADMIN — ASPIRIN 81 MG: 81 TABLET, COATED ORAL at 08:17

## 2023-03-05 RX ADMIN — ACETAMINOPHEN 650 MG: 325 TABLET ORAL at 14:11

## 2023-03-05 RX ADMIN — ACETAMINOPHEN 650 MG: 325 TABLET ORAL at 21:03

## 2023-03-05 RX ADMIN — POTASSIUM CHLORIDE 10 MEQ: 750 CAPSULE, EXTENDED RELEASE ORAL at 08:17

## 2023-03-05 RX ADMIN — NALOXEGOL OXALATE 25 MG: 12.5 TABLET, FILM COATED ORAL at 08:17

## 2023-03-05 RX ADMIN — FUROSEMIDE 20 MG: 20 TABLET ORAL at 08:18

## 2023-03-05 RX ADMIN — OXYCODONE 10 MG: 5 TABLET ORAL at 14:13

## 2023-03-05 RX ADMIN — Medication 2000 UNITS: at 17:08

## 2023-03-05 ASSESSMENT — PAIN SCALES - GENERAL
PAINLEVEL_OUTOF10: 5
PAINLEVEL_OUTOF10: 4
PAINLEVEL_OUTOF10: 10
PAINLEVEL_OUTOF10: 10
PAINLEVEL_OUTOF10: 4
PAINLEVEL_OUTOF10: 5

## 2023-03-05 ASSESSMENT — PAIN DESCRIPTION - ORIENTATION
ORIENTATION: LEFT

## 2023-03-05 ASSESSMENT — PAIN DESCRIPTION - FREQUENCY
FREQUENCY: CONTINUOUS
FREQUENCY: CONTINUOUS

## 2023-03-05 ASSESSMENT — PAIN DESCRIPTION - PAIN TYPE
TYPE: CHRONIC PAIN
TYPE: CHRONIC PAIN

## 2023-03-05 ASSESSMENT — PAIN DESCRIPTION - DESCRIPTORS
DESCRIPTORS: ACHING;SHARP
DESCRIPTORS: ACHING
DESCRIPTORS: SHARP;ACHING
DESCRIPTORS: ACHING

## 2023-03-05 ASSESSMENT — PAIN DESCRIPTION - LOCATION
LOCATION: SHOULDER;ARM
LOCATION: SHOULDER
LOCATION: ARM

## 2023-03-05 ASSESSMENT — PAIN - FUNCTIONAL ASSESSMENT: PAIN_FUNCTIONAL_ASSESSMENT: PREVENTS OR INTERFERES SOME ACTIVE ACTIVITIES AND ADLS

## 2023-03-05 NOTE — PROGRESS NOTES
Facility/Department: [facility] R252/R252-01    NAME: Nancy Lam    : 1935 (82 y.o.)  MRN: [id number]    Account: [de-identified]  Gender: male    Attempted to see patient for unscheduled, additional PT session. Patient reports not sleeping well last night due to increased Lt UE pain, declining to participate. Reports he had a US done earlier today.        Electronically signed by Sunil Bradford PTA on 3/5/23 at 2:37 PM EST

## 2023-03-05 NOTE — FLOWSHEET NOTE
Patient had 150cc of jim colored fluid from his pleurex catheter. Yesterday he had been coughing a lot , not so much today but it is a PRN order as well as M/W/F. He is feeling slightly better.

## 2023-03-05 NOTE — PLAN OF CARE
Problem: Discharge Planning  Goal: Discharge to home or other facility with appropriate resources  3/4/2023 2320 by Jagruti Mcgregor, RN  Outcome: Progressing  Flowsheets  Taken 3/4/2023 2100 by Jagruti Mcgregor, RN  Discharge to home or other facility with appropriate resources: Identify barriers to discharge with patient and caregiver  Taken 3/4/2023 1050 by Radha Garcia, RN  Discharge to home or other facility with appropriate resources: Identify barriers to discharge with patient and caregiver  3/4/2023 1048 by Radha Garcia, RN  Outcome: Progressing

## 2023-03-05 NOTE — PROGRESS NOTES
Subjective: The patient complains of moderate to severe acute on chronic progressive fatigue and  PRIETO partially relieved by rest, medications, PT,  OT,   SLP and rest and exacerbated by recent illness  . Patient had initially been treated at [facility] [place] where a nerve stimulator placement was attempted but delayed due to high INR. Once the procedure was finally initiated it was aborted because of possible CSF leak. Patient became shortness of breath postprocedure but this was found to be largely unrelated to the procedure but related to a large loculated pleural effusion. Patient was transferred to [facility] for thoracotomy and VATS procedure performed by Dr. Linh Mercer. As noted patient had a chest tube inserted by Dr. Linh Mercer on 2/18/2023. He has been followed by thoracic surgery pulmonology oncology GI and cardiology as well as the hospitalist.    rn  Signed                Patient had 150cc of jim colored fluid from his pleurex catheter. Yesterday he had been coughing a lot , not so much today but it is a PRN order as well as M/W/F. He is feeling slightly better             ROS x10: The patient also complains of severely impaired mobility and activities of daily living. Otherwise no new problems with vision, hearing, nose, mouth, throat, dermal, cardiovascular, GI, , pulmonary, musculoskeletal, psychiatric or neurological. See also Acute Rehab PM&R H&P. Vital signs:  /63   Pulse 75   Temp 98.2 °F (36.8 °C) (Oral)   Resp 15   Ht 5' 7\" (1.702 m)   Wt 262 lb (118.8 kg)   SpO2 97%   BMI 41.04 kg/m²   I/O:   PO/Intake:  fair PO intake,   Reg diet    Bowel:   continent LBM 3/1  Bladder: continent    bishop  General:  Patient is well developed,   adequately nourished, and    well kempt. HEENT:    Pupils equal, hearing intact to loud voice, external inspection of ear and nose benign.   Inspection of lips, tongue and gums  thrush    Musculoskeletal: No significant change in strength or tone. All joints stable. Inspection and palpation of digits and nails show no clubbing, cyanosis or inflammatory conditions. Neuro/Psychiatric: Affect: flat but pleasant. Alert and oriented to person, place and situation with  min cues. No significant change in deep tendon reflexes or sensation  Lungs:  Diminished, CTA-B. Respiration effort is   normal at rest.   Pleurx cath dressing D&I L Side. Heart:   S1 = S2,   RRR. Abdomen:  Soft, non-tender, no enlargement of liver or spleen. Extremities:   mod lower extremity edema    Skin:   Intact to general survey,  Pleurx cath dressing D&I L Side. Rehabilitation:  Physical Therapy:   Bed mobility:  Bed mobility  Rolling to Left: Minimal assistance (02/26/23 1242)  Rolling to Right: Minimal assistance (02/26/23 1242)  Supine to Sit: Moderate assistance (02/26/23 1242)  Sit to Supine: Moderate assistance (02/26/23 1242)  Bed Mobility Comments: HOB flat; cues to avoid breath holding (02/26/23 1242)  Roll Left  Assistance Level: Stand by assist (03/03/23 1120)  Skilled Clinical Factors: With use of bed rail (03/02/23 1317)  Roll Right  Assistance Level: Stand by assist (03/03/23 1120)  Skilled Clinical Factors: With use of bed rail (03/02/23 1317)  Sit to Supine  Assistance Level: Stand by assist (03/03/23 1120)  Skilled Clinical Factors: Increased time to complete (03/02/23 1317)  Supine to Sit  Assistance Level: Stand by assist (03/03/23 1120)  Skilled Clinical Factors: Increased time and effort (03/02/23 1317)  Scooting  Assistance Level: Stand by assist (03/03/23 1120)  Transfers:  Transfers  Sit to Stand:  Moderate Assistance (02/26/23 1243)  Stand to Sit: Moderate Assistance (02/26/23 1243)  Comment: poor use of L LE during sit to stand; [employer] used (02/26/23 1243)  Sit to Stand  Assistance Level: Stand by assist;Supervision (03/04/23 1511)  Skilled Clinical Factors: cont multiple attempts to stand (03/04/23 1511)  Stand to Sit  Assistance Level: Supervision (03/04/23 1511)  Skilled Clinical Factors: completed safely without vc (03/04/23 1511)  Bed To/From Chair  Technique: Stand step (03/02/23 1317)  Assistance Level: Stand by assist;Supervision (03/03/23 1120)  Skilled Clinical Factors: Increased time to complete requires use of UE's to complete (03/02/23 1317)  Car Transfer  Assistance Level: Minimal assistance (02/27/23 0942)  Skilled Clinical Factors: [place] to rise from low level chair able to lift BLE in and out of car (02/27/23 0942)  Gait:   Ambulation  Surface: Level tile (03/02/23 1406)  Device: Rolling Walker (03/02/23 1406)  Other Apparatus: O2 (03/02/23 1406)  Assistance: Contact guard assistance;Stand by assistance (03/02/23 1406)  Quality of Gait: cues for posture and upward gaze, decreased step length and height (03/02/23 1406)  Gait Deviations: Slow Jefe; Increased MENA; Decreased step length;Decreased step height (02/26/23 1244)  Distance: 100' (03/02/23 1406)  Ambulation  Surface: Carpet (03/04/23 1517)  Device: Rolling walker (03/04/23 1517)  Distance: 75 feet (03/04/23 1517)  Activity: Within Unit (03/04/23 1517)  Activity Comments: decresaed hs bilat, decreased step length, vc for upward gaze (03/04/23 1517)  Additional Factors: Verbal cues (03/04/23 1517)  Assistance Level: Supervision (03/04/23 1517)  Gait Deviations: Slow jefe;Decreased heel strike left;Decreased step length bilateral (03/04/23 1517)  Skilled Clinical Factors: 2 liters o2 (03/04/23 1517)  Stairs:  Stairs/Curb  Stairs?: No (03/02/23 1406)  Stairs  Stair Height: 6'' (03/03/23 1615)  Device: Bilateral handrails (03/03/23 1615)  Number of Stairs: 4 (03/03/23 1615)  Additional Factors: Verbal cues; Non-reciprocal going down;Reciprocal going up (03/03/23 0349)  Assistance Level: Stand by assist (03/02/23 1214)  Skilled Clinical Factors: Completed with 2L 93% post stair negotiation improved 2 96% with seated rest break (03/02/23 1214)  W/C mobility:       Occupational Therapy:   Hand Dominance: Right  ADL  Feeding: Stand by assistance (02/26/23 1201)  Grooming: Minimal assistance (02/26/23 1201)  UE Bathing: Minimal assistance (02/26/23 1201)  LE Bathing: Maximum assistance (02/26/23 1201)  LE Bathing Skilled Clinical Factors: periare only per pt request (02/26/23 1201)  UE Dressing: Moderate assistance (02/26/23 1201)  LE Dressing: Maximum assistance (02/26/23 1201)  Toileting: Maximum assistance (02/26/23 1201)  Additional Comments: sponge bath ADL completed sitting EOB and patially on toilet. (02/26/23 1201)  Toilet Transfers  Toilet - Technique: Ambulating (02/26/23 1207)  Equipment Used: Grab bars (02/26/23 1207)  Toilet Transfer: Moderate assistance (02/26/23 1207)  Toilet Transfers Comments: CGA on ModA off (02/26/23 1207)          Speech Therapy:      Comprehension:  (AUditory comprehension is [facility])  Verbal Expression:  (Mild to moderate deficits noted with divergent and convergent naming tasks. Min assist required to improve naming skills.)  Diet/Swallow:           Compensatory Swallowing Strategies : Alternate solids and liquids, Eat/Feed slowly, Upright as possible for all oral intake, Small bites/sips          Lab/X-ray studies reviewed, analyzed and discussed with patient and staff:   Recent Results (from the past 24 hour(s))   Protime-INR    Collection Time: 03/05/23  4:17 AM   Result Value Ref Range    Protime 31.6 (H) 12.3 - 14.9 sec    INR 3.0    COVID-19, Rapid    Collection Time: 03/05/23  2:07 PM    Specimen: Nasopharyngeal Swab; Nares   Result Value Ref Range    SARS-CoV-2, NAAT Not Detected Not Detected       XR ABDOMEN   2/18/2023   1. Complete opacification of left hemithorax compatible with some combination of pleural effusion and atelectasis. Secondary obscuration of left heart border. 2.  Nonobstructive bowel gas pattern. No acute abdominal disease identified. 3.  Probable left renal stone.      CT CHEST   2/23/2023 : Mediastinum: Thyroid is homogeneous in appearance. Vascular calcifications seen within the coronary vessels. Cardiac chambers are mildly enlarged. Pacer leads in satisfactory position. Moderate-sized pericardial effusion. Bulky adenopathy identified in the left hilar region likely reactive. Small lymph nodes seen scattered throughout the mediastinum likely reactive. Atherosclerotic disease seen diffusely throughout the thoracic aorta. Lungs/pleura: There is small chest tube identified in place on the left with small left pleural effusion. Airspace consolidation seen within the left upper and lower lung fields suggesting superimposed infiltrate such as pneumonia. Mild increased markings identified at the right lung base to suggest atelectatic change. Trace pneumothorax identified anteriorly. Upper Abdomen: Stones in the gallbladder. Small hiatal hernia. Soft Tissues/Bones: Multilevel degenerative changes seen within the spine. No acute chest wall abnormality. Indwelling chest tube identified on the left with small left pleural effusion and trace anterior pneumothorax. Consolidation seen in airspace disease throughout the left upper and lower lobes to suggest a multifocal superimposed pneumonia. Soft tissue density seen in the hilar region to suggest possible reactive adenopathy. Moderate-sized pericardial effusion. Minimal atelectatic changes seen at the right lung base. Incidental stones in the gallbladder with small hiatal hernia. XR CHEST   2/19/2023   1. Minimal partial clearing of the left lung. The previously noted left pleural effusion is smaller   2. Stable position of the left chest tube   3. The right lung is clear. XR CHEST  2/18/2023   1. Apparent interval placement of left chest tube catheter. No pneumothorax. 2.  Persistent opacification of the left hemithorax. XR CHEST  2/18/2023   1.  Near complete whiteout of the left hemithorax likely represent a combination of partial collapse of the left lung and large pleural effusion   2. Cardiomegaly   3. The right lung is grossly clear. US DUP UPPER EXTREMITY RIGHT VENOUS  2023   No evidence of DVT. FLUORO FOR SURGICAL PROCEDURES  2023  12 spot images of the lumbar spine were obtained. Intraprocedural fluoroscopic spot images as above. See separate procedure report for more information. EGD 2023  [address] [facility] Patient: Sreekanth Marks MRN: [id number] : 1935 Account: Gender: Male Age: 82 Years Procedure: Upper GI endoscopy Date: 2023 Attending Physician: Giuseppe Cheng Indications:        -  Anemia        -  Melena Medications:        -  Monitored Anesthesia Care Complications:        -  No immediate complications. Estimated Blood Loss:        -  Estimated blood loss: none. Procedure:        - The Gastroscope was introduced through the mouth and advanced to the second           part of the duodenum.        -  The patient tolerated the procedure well. Findings:        -  A 2 cm hernia was found.        -  Esophagogastric landmarks were identified: the gastroesophageal junction           was found at 36 cm, the lower esophageal sphincter was found at 38 cm and the           upper extent of the gastric folds was found at 36 cm from the incisors. -  Patchy moderate inflammation characterized by erythema was found in the           gastric antrum and in the gastric body. -  The examined duodenum was normal.        -  The cardia and gastric fundus were normal on retroflexion.        - No old or fresh blood noted Impression:        -  2 cm hernia. -  Esophagogastric landmarks identified.        -  Gastritis. -  Normal examined duodenum.        -  No specimens collected. - No old or fresh blood noted Recommendation:        -  Put patient on a clear liquid diet starting today. -  Continue present medications.         -     - , Esophagogastroduodenoscopy, flexible, transoral; diagnostic,           including collection of specimen(s) by brushing or washing, when performed           (separate procedure) Diagnosis Code(s):        - D64.9, Anemia, unspecified        - K92.1, Melena (includes Hematochezia)        - K44.9, Diaphragmatic hernia without obstruction or gangrene        - K29.70, Gastritis, unspecified, without bleeding           Previous extensive, complex labs, notes and diagnostics reviewed and analyzed. ALLERGIES:    Allergies as of 02/25/2023 - Fully Reviewed 02/25/2023   Allergen Reaction Noted    Benadryl [diphenhydramine hcl] Anxiety 01/31/2012    Diphenhydramine Anxiety 05/09/2018      (please also verify by checking [facility])      Complex Physical Medicine & Rehab Issues Assess & Plan:   Severe abnormality of gait and mobility and impaired self-care and ADL's secondary to progressive cardiopulmonary debility. Functional and medical status reassessed regarding patients ability to participate in therapies and patient found to be able to participate in acute intensive comprehensive inpatient rehabilitation program including PT/OT to improve balance, ambulation, ADLs, and to improve the P/AROM. Therapeutic modifications regarding activities in therapies, place, amount of time per day and intensity of therapy made daily. In bed therapies or bedside therapies prn. Bowel and Bladder dysfunction  , Neurogenic bowel and bladder:  frequent toileting, ambulate to bathroom with assistance, check post void residuals. Check for C.difficile x1 if >2 loose stools in 24 hours, continue bowel & bladder program.  Monitor bowel and bladder function. Lactinex 2 PO every AC. MOM prn, Brown Bomb prn, Glycerin suppository prn, enema prn. Encourage therapy and nursing to co-treat and problem solve re continence.     Severe back pain, lumbar, as well as generalized OA pain: reassess pain every shift and prior to and after each therapy session, give prn Tylenol and consider scheduled Tylenol, modalities prn in therapy, masage, Lidoderm, K-pad prn. Consider scheduled AM pain meds. Skin healing  ears and breakdown risk:  continue pressure relief program.  Daily skin exams and reports from nursing. Fatigue due to nutritional and hydration deficiency: Add and titrate vitamin B12 vitamin D and CoQ10 continue to monitor I&Os, calorie counts prn, dietary consult prn. Add healthy snack at night. Acute episodic insomnia with situational adjustment disorder:  prn Ambien, monitor for day time sedation. Falls risk elevated:  patient to use call light to get nursing assistance to get up, bed and chair alarm. Elevated DVT risk: progressive activities in PT, continue prophylaxis JOB hose, elevation and meds-see MAR. Complex discharge planning: Discharge March 12, 2023 home with his wife and home health care. Weekly team meeting every Monday to re-assess progress towards goals, discuss and address social, psychological and medical comorbidities and to address difficulties they may be having progressing in therapy. Patient and family education is in progress. The patient is to follow-up with their family physician after discharge. Complex Active General Medical Issues that complicate care Assess & Plan:    Mesothelioma with dry cough-titrate O2, aerosols, follow-up with Dr. Darrel Laughlin, drain pleural Dex daily versus 3 times a week per protocols from pulmonology and lung surgery consult  Mixed hyperlipidemia,  Atherosclerosis of native artery of both lower extremities with intermittent claudication, Atrial fibrillation, Venous insufficiency-Acute rehab to monitor heart rate and rhythm with the option of telemetry and the effects of chronotropic medication with respect to increasing physical activity and exercise in PT, OT, ADLs with medication titration to lowest effective dosing.   Continue blood signs every shift focusing on heart rate, rhythm and blood pressure checks with orthostatic checks-monitoring the effect of exercise, therapy and posture. Consult hospitalist for backup medical and adjust/add medications (aspirin, Lasix, Lopressor, Crestor, Coumadin, bridging Lovenox). Monitor heart rate and blood pressure as well as medications effects on vital signs before during and after therapy with especial focus on preventing orthostasis and falls risk. Gastritis without bleeding-Elevate head of bed after meals, monitor stools for blood, lowest effective dose of PPI, consider Tums. Pneumonia of both lungs due to infectious organism-Merrem Acute rehab for endurance traing with Pulse Ox to monitoring oxygen saturation and heart rate with O2 titration to lowest effective dose. Pulse oximeter checks to shift and at HS to dose and titrate oxygen and aerosol treatments monitor for nocturnal hypoxemia, monitor vital signs, oxygen prn. Focus on energy conservation. eft a Pleurx tube in O2   drain  GERD-Elevate head of bed after meals, monitor stools for blood, lowest effective dose of PPI, consider Tums. Anticoagulation coumadinization for [id number] pharmacist regarding INR management    Acute kidney failure-Eliminate toxic medications, monitor I's and O's focusing on urine output, recheck BMP. Spinal stenosis of lumbar region with neurogenic claudication    Balance disorder-focus on balance and therapy     Focus on emotional health and caregiver involvement in care.   Rule out COVID-19        Family with concerns outside of rehab focus, await specialist input  Cont care, no increased sob or pain   Follow up pulmonary   Covid neg test   Amy Bello D.O., PM&R     Attending    [address]

## 2023-03-05 NOTE — PROGRESS NOTES
Pt assessment was completed. Pt was sitting up in chair-assisted pt to bathroom-pt using W/W.  Pt just urinated. Last BM 3/4. Pt from sitting to standing needed minimal assistance. Ambulating SBA. In bed pt C/O pain L shoulder  @ \"7\". Medicated with oxy 10mg with HS meds.pt on 2L oxygen- pt stiill SOB with exertion. Pt's CPAP on now. Call light within reach and bed alrm agitated.

## 2023-03-05 NOTE — FLOWSHEET NOTE
Patient assessment is complete. RN will drain his pleurx catheter today. There is a weight change and patient was coughing a lot yesterday.

## 2023-03-05 NOTE — PROGRESS NOTES
Progress Note    Date:3/5/2023       [facility]  Patient Name:Garth Pablo     YOB: 1935     Age:87 y.o. Assessment        Hospital Problems             Last Modified POA    * (Principal) Impaired mobility and activities of daily living dt CP debility 2/26/2023 Yes    Mixed hyperlipidemia 2/26/2023 Yes    Gastritis without bleeding 2/26/2023 Yes    Pneumonia of both lungs due to infectious organism 2/26/2023 Yes    Acute kidney failure ([address] 2/26/2023 Yes    Spinal stenosis of lumbar region with neurogenic claudication 2/26/2023 Yes    Atherosclerosis of native artery of both lower extremities with intermittent claudication ([address] 2/26/2023 Yes    Atrial fibrillation ([address] 2/26/2023 Yes    Overview Signed 2/2/2021  3:01 PM by EDELMIRA Tariq - CNP     Past cardioversion         Venous insufficiency 2/26/2023 Yes    Balance disorder 2/26/2023 Yes       Plan:      *Impaired mobility and ADLs due to CP debility-managed by Dr. Kathy Corona    *left upper arm pain- rates pain a 10. US to r/o DVT; CT of left shoulder and humerous/limted mobility, pt unable lift left arm     *Loculated pleural effusion-ID following ; Dr. Kelsi Ibarra . 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 ATB discontinued  Oxygen during the day as needed. CPAP at night     *Paroxysmal [id number] by cardiology. ECG on 2/27/2023 atrial paced normal sinus rhythm on aspirin, metoprolol for rate control, and Coumadin pharmacy dose. *HTN-current BP within normal range;on metoprolol     *CHF-left ventricular ejection fraction 58%; on Lasix     *Hyperlipidemia-Crestor 10 mg daily     Hospital medicine managing acute needs. Patient will need to follow-up PCP for chronic disease management. Time spent with patient 35 minutes. Greater than 70% of time  spent focused exclusively on this patient ,reviewing  chart,  reconciling medications, &  answering questions with patient and discussing plan.     Subjective Interval History Status: not changed. Patient up in wheelchair at bedside with oxygen in place. Patient seen by provider for reports of left upper extremity pain. Patient rates pain 15. He reports having pain for several days now. Medicated by nurse with oxycodone for pain. patient denies chest pain, palpitations, headache, dizziness, shortness of breath, fever, chills, N/V/D, and changes in appetite. Reports nonproductive cough. Review of Systems   12 point review of systems reviewed with patient with pertinent positives listed in HPI otherwise ROS negative  Medications   Scheduled Meds:    potassium chloride  10 mEq Oral Daily    Vitamin D  2,000 Units Oral Dinner    cyanocobalamin  1,000 mcg IntraMUSCular Weekly    coenzyme Q10  100 mg Oral Daily    warfarin placeholder: dosing by pharmacy   Other RX Placeholder    aspirin  81 mg Oral Daily    furosemide  20 mg Oral Daily    guaiFENesin  600 mg Oral BID    melatonin  5 mg Oral Nightly    metoprolol tartrate  25 mg Oral BID    naloxegol  25 mg Oral QAM    rosuvastatin  10 mg Oral Daily     Continuous Infusions:   PRN Meds: camphor-menthol-methyl salicylate, guaiFENesin-dextromethorphan, hydrocortisone, lidocaine, polyvinyl alcohol, bisacodyl, sodium phosphate, acetaminophen, ipratropium-albuterol, ondansetron **OR** ondansetron, oxyCODONE **OR** oxyCODONE    Past History    Past Medical History:   has a past medical history of A-fib ([address] Arthritis, Baker's cyst of knee, left, Cancer ([address] Cerebral artery occlusion with cerebral infarction ([address] Congestive heart failure ([address] Coronary artery disease, HTN (hypertension), Hx of blood clots, Hyperlipidemia, Pacemaker, Polycythemia, and Torn meniscus. Social History:   reports that he quit smoking about 54 years ago. His smoking use included cigarettes. He has a 5.00 pack-year smoking history. He has never used smokeless tobacco. He reports current alcohol use.  He reports that he does not use drugs. Family History:   Family History   Problem Relation Age of Onset    Heart Attack Mother     Other Mother          in MVA    Other Father          at age 80    Other Sister          as infant    Cancer Brother     Other Brother         unknown medical hx    Other Brother          at age 61 due to accident    Cancer Daughter         colon & lung cancer    Colon Cancer Daughter     No Known Problems Son     Colon Cancer Other     Breast Cancer Other        Physical Examination      Vitals:  /63   Pulse 75   Temp 98.2 °F (36.8 °C) (Oral)   Resp 15   Ht 5' 7\" (1.702 m)   Wt 262 lb (118.8 kg)   SpO2 97%   BMI 41.04 kg/m²   Temp (24hrs), Av.1 °F (36.7 °C), Min:97.9 °F (36.6 °C), Max:98.2 °F (36.8 °C)      I/O (24Hr): Intake/Output Summary (Last 24 hours) at 3/5/2023 6755  Last data filed at 3/5/2023 0252  Gross per 24 hour   Intake 240 ml   Output --   Net 240 ml     CONSTITUTIONAL:  Awake, alert, no apparent distress, and appears stated age  EYES: pupils equal, round and reactive to light   NECK:  Supple   LUNGS:  No increased work of breathing, good air exchange, clear to auscultation bilaterally, no crackles or wheezing  CARDIOVASCULAR:  Normal apical impulse, regular rate and rhythm  ABDOMEN:  No scars, normal bowel sounds, soft, non-distended, non-tender  MUSCULOSKELETAL:  There is no redness, warmth, or swelling of the joints. Limited range of motion left extremity noted; nodule to the left humerus  NEUROLOGIC:  Awake, alert. No focal deficits noted  SKIN:  No bruising or bleeding, normal skin color, texture, turgor, no redness, warmth, or swelling, no rashes, and no lesions    Labs/Imaging/Diagnostics   Labs:  CBC:No results for input(s): WBC, RBC, HGB, HCT, MCV, RDW, PLT in the last 72 hours. CHEMISTRIES:No results for input(s): NA, K, CL, CO2, BUN, CREATININE, GLUCOSE, CA, PHOS, MG in the last 72 hours.   PT/INR:  Recent Labs     23  0514 23 03/05/23  0417   PROTIME 34.5* 31.0* 31.6*   INR 3.4 3.0 3.0     APTT:No results for input(s): APTT in the last 72 hours. LIVER PROFILE:No results for input(s): AST, ALT, BILIDIR, BILITOT, ALKPHOS in the last 72 hours. Imaging Last 24 Hours:  No results found.     Electronically signed by EDELMIRA Vanegas CNP on 3/5/23 at 9:03 AM EST

## 2023-03-05 NOTE — PLAN OF CARE
Problem: Discharge Planning  Goal: Discharge to home or other facility with appropriate resources  3/5/2023 1209 by Tamir Gerardo RN  Outcome: Progressing  Flowsheets (Taken 3/5/2023 1150)  Discharge to home or other facility with appropriate resources: Identify barriers to discharge with patient and caregiver  3/4/2023 2320 by Melissa Mota RN  Outcome: Progressing  Flowsheets  Taken 3/4/2023 2100 by Melissa Mota RN  Discharge to home or other facility with appropriate resources: Identify barriers to discharge with patient and caregiver  Taken 3/4/2023 1050 by Tamir Gerardo RN  Discharge to home or other facility with appropriate resources: Identify barriers to discharge with patient and caregiver     Problem: Pain  Goal: Verbalizes/displays adequate comfort level or baseline comfort level  3/5/2023 1209 by Tamir Gerardo RN  Outcome: Progressing  3/4/2023 2320 by Melissa Mota RN  Outcome: Progressing     Problem: Safety - Adult  Goal: Free from fall injury  3/5/2023 1209 by Tamir Gerardo RN  Outcome: Progressing  3/4/2023 2320 by Melissa Mota RN  Outcome: Progressing     Problem: ABCDS Injury Assessment  Goal: Absence of physical injury  3/5/2023 1209 by Tamir Gerardo RN  Outcome: Progressing  3/4/2023 2320 by Melissa Mota RN  Outcome: Progressing     Problem: Chronic Conditions and Co-morbidities  Goal: Patient's chronic conditions and co-morbidity symptoms are monitored and maintained or improved  3/5/2023 1209 by Tamir Gerardo RN  Outcome: Progressing  Flowsheets (Taken 3/5/2023 1150)  Care Plan - Patient's Chronic Conditions and Co-Morbidity Symptoms are Monitored and Maintained or Improved: Monitor and assess patient's chronic conditions and comorbid symptoms for stability, deterioration, or improvement  3/4/2023 2320 by Melissa Mota RN  Outcome: Progressing  Flowsheets  Taken 3/4/2023 2100 by Ashley Chino [address] - Patient's Chronic Conditions and Co-Morbidity Symptoms are Monitored and Maintained or Improved: Monitor and assess patient's chronic conditions and comorbid symptoms for stability, deterioration, or improvement  Taken 3/4/2023 1050 by Radha Garcia RN  Care Plan - Patient's Chronic Conditions and Co-Morbidity Symptoms are Monitored and Maintained or Improved: Monitor and assess patient's chronic conditions and comorbid symptoms for stability, deterioration, or improvement     Problem: Skin/Tissue Integrity  Goal: Absence of new skin breakdown  Description: 1. Monitor for areas of redness and/or skin breakdown  2. Assess vascular access sites hourly  3. Every 4-6 hours minimum:  Change oxygen saturation probe site  4. Every 4-6 hours:  If on nasal continuous positive airway pressure, respiratory therapy assess nares and determine need for appliance change or resting period.   3/5/2023 1209 by Rahda Garcia RN  Outcome: Progressing  3/4/2023 2320 by Jagruti Mcgregor RN  Outcome: Progressing

## 2023-03-05 NOTE — PROGRESS NOTES
Pt c/o \"10\" pain in l shoulder -medicated with oxy 10mg po-new ic pack given. Call light within reach.

## 2023-03-06 PROBLEM — M19.012 ARTHRITIS OF SHOULDER REGION, LEFT: Status: ACTIVE | Noted: 2023-03-06

## 2023-03-06 LAB
INR BLD: 3.1
PROTHROMBIN TIME: 32.3 SEC (ref 12.3–14.9)

## 2023-03-06 PROCEDURE — 6370000000 HC RX 637 (ALT 250 FOR IP): Performed by: PHYSICAL MEDICINE & REHABILITATION

## 2023-03-06 PROCEDURE — 97535 SELF CARE MNGMENT TRAINING: CPT

## 2023-03-06 PROCEDURE — 99233 SBSQ HOSP IP/OBS HIGH 50: CPT | Performed by: PHYSICAL MEDICINE & REHABILITATION

## 2023-03-06 PROCEDURE — 97129 THER IVNTJ 1ST 15 MIN: CPT

## 2023-03-06 PROCEDURE — 1180000000 HC REHAB R&B

## 2023-03-06 PROCEDURE — 36415 COLL VENOUS BLD VENIPUNCTURE: CPT

## 2023-03-06 PROCEDURE — 99221 1ST HOSP IP/OBS SF/LOW 40: CPT | Performed by: ORTHOPAEDIC SURGERY

## 2023-03-06 PROCEDURE — 85610 PROTHROMBIN TIME: CPT

## 2023-03-06 PROCEDURE — 6370000000 HC RX 637 (ALT 250 FOR IP): Performed by: REGISTERED NURSE

## 2023-03-06 PROCEDURE — 6370000000 HC RX 637 (ALT 250 FOR IP): Performed by: FAMILY MEDICINE

## 2023-03-06 PROCEDURE — 97110 THERAPEUTIC EXERCISES: CPT

## 2023-03-06 PROCEDURE — 97530 THERAPEUTIC ACTIVITIES: CPT

## 2023-03-06 PROCEDURE — 97130 THER IVNTJ EA ADDL 15 MIN: CPT

## 2023-03-06 PROCEDURE — 20610 DRAIN/INJ JOINT/BURSA W/O US: CPT | Performed by: ORTHOPAEDIC SURGERY

## 2023-03-06 PROCEDURE — 97116 GAIT TRAINING THERAPY: CPT

## 2023-03-06 RX ADMIN — ACETAMINOPHEN 650 MG: 325 TABLET ORAL at 09:01

## 2023-03-06 RX ADMIN — Medication 2000 UNITS: at 17:39

## 2023-03-06 RX ADMIN — METOPROLOL TARTRATE 25 MG: 25 TABLET, FILM COATED ORAL at 20:19

## 2023-03-06 RX ADMIN — POTASSIUM CHLORIDE 10 MEQ: 750 CAPSULE, EXTENDED RELEASE ORAL at 09:01

## 2023-03-06 RX ADMIN — Medication 100 MG: at 09:00

## 2023-03-06 RX ADMIN — Medication 5 MG: at 20:19

## 2023-03-06 RX ADMIN — ACETAMINOPHEN 650 MG: 325 TABLET ORAL at 15:47

## 2023-03-06 RX ADMIN — METOPROLOL TARTRATE 25 MG: 25 TABLET, FILM COATED ORAL at 09:01

## 2023-03-06 RX ADMIN — GUAIFENESIN 600 MG: 600 TABLET ORAL at 20:19

## 2023-03-06 RX ADMIN — NALOXEGOL OXALATE 25 MG: 12.5 TABLET, FILM COATED ORAL at 09:00

## 2023-03-06 RX ADMIN — ASPIRIN 81 MG: 81 TABLET, COATED ORAL at 09:00

## 2023-03-06 RX ADMIN — ROSUVASTATIN CALCIUM 10 MG: 5 TABLET, FILM COATED ORAL at 09:01

## 2023-03-06 RX ADMIN — GUAIFENESIN 600 MG: 600 TABLET ORAL at 09:00

## 2023-03-06 RX ADMIN — OXYCODONE 10 MG: 5 TABLET ORAL at 08:26

## 2023-03-06 RX ADMIN — ACETAMINOPHEN 650 MG: 325 TABLET ORAL at 20:19

## 2023-03-06 RX ADMIN — OXYCODONE 10 MG: 5 TABLET ORAL at 15:46

## 2023-03-06 RX ADMIN — FUROSEMIDE 20 MG: 20 TABLET ORAL at 09:01

## 2023-03-06 ASSESSMENT — PAIN DESCRIPTION - LOCATION
LOCATION: SHOULDER

## 2023-03-06 ASSESSMENT — PAIN SCALES - GENERAL
PAINLEVEL_OUTOF10: 5
PAINLEVEL_OUTOF10: 6
PAINLEVEL_OUTOF10: 3
PAINLEVEL_OUTOF10: 7
PAINLEVEL_OUTOF10: 7
PAINLEVEL_OUTOF10: 6

## 2023-03-06 ASSESSMENT — PAIN DESCRIPTION - ORIENTATION
ORIENTATION: LEFT

## 2023-03-06 ASSESSMENT — PAIN DESCRIPTION - DESCRIPTORS
DESCRIPTORS: THROBBING
DESCRIPTORS: ACHING;SORE

## 2023-03-06 NOTE — PLAN OF CARE
Problem: Discharge Planning  Goal: Discharge to home or other facility with appropriate resources  3/6/2023 1147 by Juli Miller RN  Outcome: Progressing  3/5/2023 2242 by Lisa Ponce RN  Outcome: Progressing  Flowsheets (Taken 3/5/2023 2155)  Discharge to home or other facility with appropriate resources: Identify barriers to discharge with patient and caregiver     Problem: Pain  Goal: Verbalizes/displays adequate comfort level or baseline comfort level  3/6/2023 1147 by Juli Miller RN  Outcome: Progressing  3/5/2023 2242 by Lisa Ponce RN  Outcome: Progressing     Problem: Safety - Adult  Goal: Free from fall injury  3/6/2023 1147 by Juli Miller RN  Outcome: Progressing  3/5/2023 2242 by Lisa Ponce RN  Outcome: Progressing     Problem: ABCDS Injury Assessment  Goal: Absence of physical injury  3/6/2023 1147 by Juli Miller RN  Outcome: Progressing  3/5/2023 2242 by Lisa Ponce RN  Outcome: Progressing     Problem: Chronic Conditions and Co-morbidities  Goal: Patient's chronic conditions and co-morbidity symptoms are monitored and maintained or improved  3/6/2023 1147 by Juli Miller RN  Outcome: Progressing  3/5/2023 2242 by Lisa Ponce RN  Outcome: Progressing  Flowsheets (Taken 3/5/2023 2155)  Care Plan - Patient's Chronic Conditions and Co-Morbidity Symptoms are Monitored and Maintained or Improved: Monitor and assess patient's chronic conditions and comorbid symptoms for stability, deterioration, or improvement     Problem: Skin/Tissue Integrity  Goal: Absence of new skin breakdown  Description: 1. Monitor for areas of redness and/or skin breakdown  2. Assess vascular access sites hourly  3. Every 4-6 hours minimum:  Change oxygen saturation probe site  4. Every 4-6 hours:  If on nasal continuous positive airway pressure, respiratory therapy assess nares and determine need for appliance change or resting period.   3/6/2023 1147 by Juli Miller RN  Outcome: Progressing  3/5/2023 2242 by Dev Eastman, RN  Outcome: Progressing

## 2023-03-06 NOTE — PROGRESS NOTES
Physical Therapy Rehab Treatment Note  [facility]  [facility]       NAME: Sasha Atkinson  : 1935 (82 y.o.)  MRN: [id number]  CODE STATUS: Full Code    Date of Service: 3/6/2023     Restrictions:  Restrictions/Precautions: Fall Risk  Position Activity Restriction  Other position/activity restrictions: pleurx on L side    SUBJECTIVE:   Subjective: \"My shoulder is up there today\"    Pain  Pain: 6/10 L shoulder L wrist pain pre session \"Achy\" Medicated prior to treatment    OBJECTIVE:     Roll Left  Assistance Level: Supervision  Skilled Clinical Factors: Increased time to complete good technique  Roll Right  Assistance Level: Supervision  Sit to Supine  Assistance Level: Supervision  Skilled Clinical Factors: Increased time to complete  Supine to Sit  Assistance Level: Supervision  Skilled Clinical Factors: Cues for breathing throughout transfer  Scooting  Assistance Level: Supervision    Sit to Stand  Assistance Level: Supervision  Skilled Clinical Factors: cont multiple attempts to stand  Stand to Sit  Assistance Level: Supervision  Skilled Clinical Factors: completed safely without vc  Bed To/From Chair  Technique: Stand step  Assistance Level: Supervision  Skilled Clinical Factors: Completes with [employer]    Ambulation  Surface: Carpet; Level surface; Uneven surface  Device: Rolling walker  Distance: 60'  Activity: Within Room  Activity Comments: decresaed hs bilat, decreased step length, vc for upward gaze  Additional Factors: Verbal cues  Assistance Level: Supervision  Gait Deviations: Slow jefe;Decreased heel strike left;Decreased step length bilateral  Skilled Clinical Factors: 2 liters O2    Stairs  Stair Height: 6''  Device: Bilateral handrails  Number of Stairs: 4  Additional Factors: Verbal cues; Non-reciprocal going down;Reciprocal going up  Assistance Level: Supervision    PT Exercises  A/AROM Exercises: Seated abdominal bracing x 10 Seated trunk extension into physioball x 10 seateaed rows RTB x 20     Activity Tolerance  Activity Tolerance: Patient tolerated treatment well    ASSESSMENT/PROGRESS TOWARDS GOALS:   Assessment  Assessment: Patient continues to show improved safety with stair negotiation transfers and bed mobility. Trialed therapy session without O2 donned maintaining 93% with all mobility  Activity Tolerance: Patient tolerated treatment well  Discharge Recommendations: Continue to assess pending progress    Goals:  Long Term Goals  Long Term Goal 1: indep bed mobility  Long Term Goal 2: Pt will demonstrate transfers indep with safest AD  Long Term Goal 3: Pt will demonstrate amb >/= 150ft supervision with safest AD - indep for 30 feet  Long Term Goal 4: Pt will demonstrate stair negotiation 3 steps without rails with safest AD SBA  Long Term Goal 5: Pt will demonstrate pantoja balance assessment >/= 45/56 for decreased risk for falls. Patient Goals   Patient Goals : Sherl Sol out of chairs easier. drive a car.  stand for longer time. \"    PLAN OF CARE/Safety:   Safety Devices  Type of Devices: All fall risk precautions in place; Chair alarm in place      Therapy Time:   Individual   Time In 1300   Time Out 1400   Minutes 60     Minutes: 60  Transfer/Bed mobility trainin  Gait trainin  Therapeutic ex: [address], PTA, 23 at 4:18 PM

## 2023-03-06 NOTE — CARE COORDINATION
[address] [place] [address] NOTE  Room: -95  Admit Date: 2023       Date: 3/6/2023  Patient Name: Alma Delia Sandoval        MRN: [id number]    : 1935  (82 y.o.)  Gender: male        REHAB DIAGNOSIS:        CO MORBIDITIES:      Past Medical History:   Diagnosis Date    A-fib ([facility] [address]     approx 1 year ago-cardioverted    Arthritis     Baker's cyst of knee, left     Cancer ([facility] [address]     mesothelioma - radiation treatments    Cerebral artery occlusion with cerebral infarction ([facility] [address]     TIA sx felt funny --     Congestive heart failure ([facility] [address] 2022    Coronary artery disease     HTN (hypertension)     meds > 20 yrs    Hx of blood clots     DVT left leg     Hyperlipidemia     meds > 20 yrs    Pacemaker 2022    Polycythemia     Torn meniscus     left knee     Past Surgical History:   Procedure Laterality Date    BACK SURGERY      COLONOSCOPY      COLONOSCOPY      CORONARY ANGIOPLASTY WITH STENT PLACEMENT  10/2006    x 1 cardiac stent    ENDOSCOPY, COLON, DIAGNOSTIC      EYE SURGERY      [place] with IOL OU    HERNIA REPAIR  2013    redo ca mesh in [id number].0916 -- umbilical hernia    JOINT REPLACEMENT Bilateral  &     Bilateral TKR    KNEE SURGERY Left      arthroscopic surgery to repair meniscus of left knee    LAMINECTOMY N/A 2021    RIGHT BILATERAL L2-3 3-4 4-5 MICRODECOMPRESSION performed by Nery Dumont MD at [facility]    PAIN MANAGEMENT PROCEDURE N/A 2023    ATTEMPTED SPINAL CORD STIMULATOR PERMANENT PLACEMENT performed by Kristian Lechuga MD at [address] N/A 1/10/2023    SPINAL CORD STIMULATOR TRIAL performed by Kristian Lechuga MD at [place]  age 8    [address]  2012    UPPER GASTROINTESTINAL ENDOSCOPY N/A 2023    EGD DIAGNOSTIC ONLY performed by Kym Lan MD at [facility] Restrictions  Restrictions/Precautions: Fall Risk  Position Activity Restriction  Other position/activity restrictions: pleurx on L side  CASE MANAGEMENT    Social/Functional History  Social/Functional History  Lives With: Spouse  Type of Home: House  Home Layout: One level (with basement; rarely goes down to basement)  Home Access: Stairs to enter without rails  Entrance Stairs - Number of Steps: 2-3  Bathroom Shower/Tub: Walk-in shower, Doors  Bathroom Toilet: Handicap height  Bathroom Accessibility: Walker accessible, Wheelchair accessible  Home Equipment: Walker, rolling, Rollator, Cane (c-pap (brought in the one from home))  Has the patient had two or more falls in the past year or any fall with injury in the past year?: Yes  ADL Assistance: Independent  Homemaking Assistance: Needs assistance  Meal Prep Responsibility: No (wife completes)  Laundry Responsibility: No (wife completes)  Cleaning Responsibility: No (wife completes)  Bill Paying/Finance Responsibility: No (wife completes)  Shopping Responsibility: No (wife completes)  Health Care Management: Secondary (completes together with pill organizer)  Ambulation Assistance: Independent (RW in home and rollator in community)  Transfer Assistance: Independent  Active : No  Patient's  Info: wife  Mode of Transportation: Car  Education: trade school  Occupation: Retired  Type of Occupation: construction work  Leisure & Hobbies: Used to work on "[employer]  IADL Comments: spouse completes all cooking, cleaning, laundry, money management ; pt manages own meds       Pts personal preferences: n/a    Pts assets/resources/support system: wife, children    COVERAGE INFORMATION:Payor: MEDICARE / Plan: MEDICARE PART A AND B / Product Type: *No Product type* /       NURSING  No active isolations    Weight: 263 lb 6.4 oz (119.5 kg) / Body mass index is 41.25 kg/m². ADULT DIET;  Regular    SpO2: 97 % (03/05/23 0814)  O2 Flow Rate (L/min): 2 L/min (03/06/23 1)    Oxygen to be continued upon discharge: Yes  Home-going needs (nocturnal Pox, sleep study, RX, equipment): Yes    Skin Issues: No  Home-going needs (education, training, RX, Wound RN): No    Pain Managed: Yes    Bladder continence: Yes  Discharging with Bishop: No   Training done: No   Urology following: No   Plan/date to remove bishop: No   Bladder retraining started: No    Bowel continence:  Yes  Last BM date: 3/6/23  Need for bowel program: No    Anticoagulants: warfarin  Home-going needs (Education, labs): Yes    Antibiotics: none  Stop date: n/a  Home-going needs (education, RX, PICC): No      Other: pleurex drain--M,W,F and as needed      PHYSICAL THERAPY  Bed mobility:  Bed mobility  Rolling to Left: Minimal assistance (02/26/23 1242)  Rolling to Right: Minimal assistance (02/26/23 1242)  Supine to Sit: Moderate assistance (02/26/23 1242)  Sit to Supine: Moderate assistance (02/26/23 1242)  Bed Mobility Comments: HOB flat; cues to avoid breath holding (02/26/23 1242)  Roll Left  Assistance Level: Stand by assist (03/03/23 1120)  Skilled Clinical Factors: With use of bed rail (03/02/23 1317)  Roll Right  Assistance Level: Stand by assist (03/03/23 1120)  Skilled Clinical Factors: With use of bed rail (03/02/23 1317)  Sit to Supine  Assistance Level: Stand by assist (03/03/23 1120)  Skilled Clinical Factors: Increased time to complete (03/02/23 1317)  Supine to Sit  Assistance Level: Stand by assist (03/03/23 1120)  Skilled Clinical Factors: Increased time and effort (03/02/23 1317)  Scooting  Assistance Level: Stand by assist (03/03/23 1120)  Transfers:  Bed mobility  Rolling to Left: Minimal assistance (02/26/23 1242)  Rolling to Right: Minimal assistance (02/26/23 1242)  Supine to Sit: Moderate assistance (02/26/23 1242)  Sit to Supine:  Moderate assistance (02/26/23 1242)  Bed Mobility Comments: HOB flat; cues to avoid breath holding (02/26/23 1242)  Roll Left  Assistance Level: Stand by assist (03/03/23 1120)  Skilled Clinical Factors: With use of bed rail (03/02/23 1317)  Roll Right  Assistance Level: Stand by assist (03/03/23 1120)  Skilled Clinical Factors: With use of bed rail (03/02/23 1317)  Sit to Supine  Assistance Level: Stand by assist (03/03/23 1120)  Skilled Clinical Factors: Increased time to complete (03/02/23 1317)  Supine to Sit  Assistance Level: Stand by assist (03/03/23 1120)  Skilled Clinical Factors: Increased time and effort (03/02/23 1317)  Scooting  Assistance Level: Stand by assist (03/03/23 1120)  Gait:   Ambulation  Surface: Level tile (03/02/23 1406)  Device: Rolling Walker (03/02/23 1406)  Other Apparatus: O2 (03/02/23 1406)  Assistance: Contact guard assistance;Stand by assistance (03/02/23 1406)  Quality of Gait: cues for posture and upward gaze, decreased step length and height (03/02/23 1406)  Gait Deviations: Slow Jefe; Increased MENA; Decreased step length;Decreased step height (02/26/23 1244)  Distance: 100' (03/02/23 1406)  Ambulation  Surface: Carpet; Level surface; Uneven surface (03/06/23 1140)  Device: Rolling walker (03/06/23 1140)  Distance: 60' (03/06/23 1140)  Activity: Within Room (03/06/23 1140)  Activity Comments: decresaed hs bilat, decreased step length, vc for upward gaze (03/06/23 1140)  Additional Factors: Verbal cues (03/06/23 1140)  Assistance Level: Supervision (03/06/23 1140)  Gait Deviations: Slow jefe;Decreased heel strike left;Decreased step length bilateral (03/06/23 1140)  Skilled Clinical Factors: 2 liters O2 (03/06/23 1140)  Stairs:  Stairs/Curb  Stairs?: No (03/02/23 1406)  Stairs  Stair Height: 6'' (03/06/23 1140)  Device: Bilateral handrails (03/06/23 1140)  Number of Stairs: 4 (03/06/23 1140)  Additional Factors: Verbal cues; Non-reciprocal going down;Reciprocal going up (03/06/23 1140)  Assistance Level: Supervision (03/06/23 1140)  Skilled Clinical Factors: Completed with 2L 93% post stair negotiation improved 2 96% with seated rest break (03/02/23 1214)  W/C mobility:     LTG:  Long Term Goal 1: indep bed mobility  Long Term Goal 2: Pt will demonstrate transfers indep with safest AD  Long Term Goal 3: Pt will demonstrate amb >/= 150ft supervision with safest AD - indep for 30 feet  Long Term Goal 4: Pt will demonstrate stair negotiation 3 steps without rails with safest AD SBA  Long Term Goal 5: Pt will demonstrate pantoja balance assessment >/= 45/56 for decreased risk for falls. PT Treatment Time:  1.5 hrs      OCCUPATIONAL THERAPY    EVALUATION SELF CARE STATUS:  Hand Dominance: Right  Feeding: Stand by assistance (02/26/23 1201)  Grooming: Minimal assistance (02/26/23 1201)  UE Bathing: Minimal assistance (02/26/23 1201)  LE Bathing: Maximum assistance (02/26/23 1201)  LE Bathing Skilled Clinical Factors: periare only per pt request (02/26/23 1201)  UE Dressing: Moderate assistance (02/26/23 1201)  LE Dressing: Maximum assistance (02/26/23 1201)  Toileting: Maximum assistance (02/26/23 1201)  Additional Comments: sponge bath ADL completed sitting EOB and patially on toilet. (02/26/23 1201)             CURRENT SELF CARE:  Feeding  Assistance Level: Set-up (03/06/23 1158)  Skilled Clinical Factors: demo slight difficulty getting lid off cup and cup filled very full. Assisted pt with task to decrease risk of spilling beverage.  (03/03/23 1259)  Grooming/Oral Hygiene  Assistance Level: Modified independent (03/06/23 1158)  Skilled Clinical Factors: seated (03/06/23 1158)  Upper Extremity Bathing  Assistance Level: Minimal assistance (03/06/23 1158)  Skilled Clinical Factors: RUE arm pit and LUE assist (02/27/23 1201)  Lower Extremity Bathing  Assistance Level: Minimal assistance (03/06/23 1158)  Skilled Clinical Factors: feet only (03/06/23 1158)  Upper Extremity Dressing  Equipment Provided: Dressing stick (02/27/23 1201)  Assistance Level: Supervision (03/06/23 1158)  Skilled Clinical Factors: seated to take shirt off and standing leaning against sink for posterior support to don shirt; no LOB; pt states this is how he completes task at home to don/doff shirt, \" leaning against the sink. \" (03/06/23 1158)  Lower Extremity Dressing  Assistance Level: Moderate assistance (03/06/23 1158)  Skilled Clinical Factors: SBA to doff underwear/pants using clothing stick with training; Max A to don underwear (TD over feet, max A to waist, and pants: Max A over feet and Min A to waist) (02/27/23 1201)  Putting On/Taking Off Footwear  Equipment Provided: Dressing stick (02/27/23 1201)  Assistance Level: Maximum assistance (03/06/23 1158)  Skilled Clinical Factors: Sup with training of dressing stick to doff socks and TD (A) to don socks (02/27/23 1201)  Toileting  Assistance Level: Stand by assist (03/06/23 1158)  Skilled Clinical Factors: Mod A for waist < >knee pant/underwear management and TD (A) for posterior car to cleanse d/t having minimal loose stool (02/28/23 1253)  Toilet Transfers  Technique: Stand step (03/06/23 1158)  Equipment: Standard toilet (03/06/23 1158)  Additional Factors: With handrails (03/06/23 1158)  Assistance Level: Stand by assist (03/06/23 1158)  Skilled Clinical Factors: use of grab bars, standard toilet (02/27/23 1201)  Tub/Shower Transfers  Type: Shower (03/06/23 1158)  Transfer From: Rolling walker (03/06/23 1158)  Transfer To:  Shower chair with back (03/06/23 1158)  Assistance Level: Stand by assist (03/06/23 1158)        LTG:  Eating:Discharge Goal: Independent  Oral Hygiene:Discharge Goal: Independent  Shower/Bathe self: Discharge Goal: Supervision or touching assistance  Upper body dressing:Discharge Goal: Supervision or touching assistance  Lower body dressing:Discharge Goal: Supervision or touching assistance  Putting on taking off footwear:Discharge Goal: Supervision or touching assistance   Toileting: Discharge Goal: Supervision or touching assistance  Toilet transfer:Discharge Goal: Supervision or touching assistance  OT Treatment Time: 1.5 hrs      SPEECH THERAPY       Comprehension:  (AUditory comprehension is [facility])  Verbal Expression:  (Supervised assist required for high level naming and reasoning tasks secondary to reduced thought organization)      Diet/Swallow:             Compensatory Swallowing Strategies : Alternate solids and liquids, Eat/Feed slowly, Upright as possible for all oral intake, Small bites/sips         LTG:  Long Term Goals  Time Frame for Long Term Goals: 2 weeks or LOS until goals are met. Goal 1: Pt will demonstrate functional cognitive-linguistic abilities in all opportunities with modified independence in order to safely complete ADLs. Long-term Goals  Timeframe for Long-term Goals: n/a          COGNITION  OT: Cognition Comment: follows commands consistently  SP:        RECREATIONAL THERAPY  Attendance to recreational therapy programs:    []  Pet Therapy  [] Music Therapy  [] Art Therapy    [] Recreation Therapy Group [] Support Group           Patient social interaction (mood, participation): good participation, motivated    Patient strengths: supportive family    Patients goal: return home w/ wife    Problems/Barriers: supervision needed with O2 mgmt if pt goes home on it, cog deficits, intermittent supervision during the day        1. Safety:          - Intervention / Plan:    [x]  falls protocol     [x]  PT/OT    [x]  SP        - Results:         2. Potential DME needs:         - Intervention / Plan:  [x]  PT/OT     [x]  Assess equipment needs/access       - Results:         3. Weakness:          - Intervention / Plan:  [x]  PT/OT      []  Other:         - Results:         4.   Discharge planning needs:          - Intervention / Plan:  [x]  Weekly team conference      [x]  family training        - Results:         5.            - Intervention / Plan:          - Results:         6.            - Intervention / Plan:         - Results:         7.            - Intervention / Plan:         - Results: Discharge Plan   Estimated Length of Stay: 14 days    Tentative Discharge date: 3/12/23      Anticipated Discharge Destination:  Home      Team recommendations:    1. Follow up Therapy :    PT  OT  RN  [facility]    2.  Home Health    Other:     Equipment needed at Discharge: none needed      Team Members Present at Conference:    Physician: Dr. Pacheco Biswas Worker: ANALY Sandhu, LSW  RN: Nancy Hargrove RN  Physical Therapist: Jv Martinez PT  Occupational Therapist: Malik Hsieh, OTR  Speech Therapist: GEOFF Gillette  Nurse Manager: Srinivas Nicholas RN      Electronically signed by ANALY Sandhu, DAVID on 3/6/2023 at 2:32 PM

## 2023-03-06 NOTE — PROGRESS NOTES
[facility]  Facility/Department: [facility]  Speech Language Pathology   Treatment Note          Jermain Stewart  1935  [id number]  [x]   confirmed    Date: 3/6/2023      Restrictions/Precautions: Fall Risk  Position Activity Restriction  Other position/activity restrictions: pleurx on L side    Weight: 263 lb 6.4 oz (119.5 kg)     ADULT DIET; Regular    SpO2: 97 % (23 0814)  O2 Flow Rate (L/min): 2 L/min (23 0457)  No active isolations    Rehab Diagnosis:      Subjective:  Cooperative       Patient reported being tired this date secondary to minimal sleep last night. ST noted a decrease in accuracy compared to previous session. ST suspects it may be due to fatigue secondary to lack of the sleep. Interventions used this date:  Cognitive Skill Development    Objective/Assessment:  Patient progressing towards goals:  Short Term Goals  Time Frame for Short Term Goals: 2 weeks or LOS until goals are met. Goal 2: To increase safety awareness and judgment for safe completion of ADLs secondary to pt's cognitive deficits,  pt will complete min-mod level problem solving tasks related to ADLs (e.g. medication) with 80% accuracy and min cues. Patient completed a calendar task with 56% accuracy I, with min cueing 78%. Goal 3: To increase safety awareness and judgment for safe completion of ADLs secondary to pt's cognitive deficits, pt will provide reasonable solutions to problems of everyday living with 80% accuracy and min cues. Patient stated a reasonable solution to a given safety, household, and/or medical scenario with 75% accuracy I, with supervised assist/cueing 100%. Goal 4: To decrease pt's cognitive deficits through the use of compensatory strategies, the pt will be educated on 3 different memory strategies and verbalize how he/she might use them at home in 3 ways with  min cues.  Patient completed a mental manipulation ranking task utilizing the repetition out loud and caregiver repetition strategies with the following accuracy:  3 word repetition: 67% accuracy I, with repetition 100%  3 word rankin% accuracy I, with repetition 83%     The patient was demonstrating more difficulty with the 6th trial. The ST prompted the patient to repeat the 3 given words two times. It did improve the patient's ability to repeat the 3 words, but did not improve him ranking the words. Short-term Goals  Timeframe for Short-term Goals: n/a      Treatment/Activity Tolerance:  Patient limited by fatigue    Plan:  Continue per POC    Pain Assessment:  Patient does not c/o pain. Pain Re-assessment:  Patient does not c/o pain. Patient/Caregiver Education:  Patient educated on session and progression towards goals.     Safety Devices:  Chair alarm in place      Speech Therapy Level of Assistance Scale    AUDITORY COMPREHENSION  Rating:  Supervised Assistance    VERBAL EXPRESSION  Rating:  Supervised Assistance    MOTOR SPEECH  Rating: Independent    PROBLEM SOLVING  Rating: Supervised Assistance-Minimal Assistance    MEMORY  Rating:  Supervised Assistance-Minimal Assistance        Therapy Time  SLP Individual Minutes  Time In: 0930  Time Out: 1000  Minutes: 30            Signature: GEOFF Childers

## 2023-03-06 NOTE — CONSULTS
Subjective:      Patient ID: Sabas Duval is a 82 y.o. male who presents today for:  Left shoulder pain      HPI  Patient with pain in the left shoulder has been ongoing for some time. Patient is admitted for pneumonia and subsequent pleural effusion and currently underwent drainage for that condition. Patient was never septic and has been improving overall. Patient has known advanced arthritic change of the left shoulder joint.   Has never had an injection there in the past.    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    Cerebral artery occlusion with cerebral infarction ([facility] [address] 2000    TIA sx felt funny --     Congestive heart failure ([facility] [address] 03/08/2022    Coronary artery disease     HTN (hypertension)     meds > 20 yrs    Hx of blood clots 2012    DVT left leg     Hyperlipidemia     meds > 20 yrs    Pacemaker 07/19/2022    Polycythemia     Torn meniscus     left knee      Past Surgical History:   Procedure Laterality Date    BACK SURGERY      COLONOSCOPY  2009    COLONOSCOPY  2012    CORONARY ANGIOPLASTY WITH STENT PLACEMENT  10/2006    x 1 cardiac stent    ENDOSCOPY, COLON, DIAGNOSTIC      EYE SURGERY      [place] with IOL OU    HERNIA REPAIR  03/2013    redo ca mesh in [facility].[id number] -- umbilical hernia    JOINT REPLACEMENT Bilateral 2009 & 2011    Bilateral TKR    KNEE SURGERY Left      arthroscopic surgery to repair meniscus of left knee    LAMINECTOMY N/A 02/08/2021    RIGHT BILATERAL L2-3 3-4 4-5 MICRODECOMPRESSION performed by Donny Howard MD at [facility] 7/19/22    PAIN MANAGEMENT PROCEDURE N/A 2/16/2023    ATTEMPTED SPINAL CORD STIMULATOR PERMANENT PLACEMENT performed by Sancho Stephens MD at [address] N/A 1/10/2023    SPINAL CORD STIMULATOR TRIAL [place] [facility] [place]    TONSILLECTOMY  age 8    UMBILICAL HERNIA REPAIR  2012    UPPER GASTROINTESTINAL ENDOSCOPY N/A 2023    EGD DIAGNOSTIC ONLY performed by Mireya Goff MD at [address] [place] [address] History    Marital status:      Spouse name: Laurie Lopez    Number of children: Not on file    Years of education: Not on file    Highest education level: Not on file   Occupational History    Not on file   Tobacco Use    Smoking status: Former     Packs/day: 0.50     Years: 10.00     Pack years: 5.00     Types: Cigarettes     Quit date: 1968     Years since quittin.9    Smokeless tobacco: Never   Vaping Use    Vaping Use: Never used   Substance and Sexual Activity    Alcohol use: Yes     Comment: social    Drug use: Never    Sexual activity: Not on file   Other Topics Concern    Not on file   Social History Narrative    Lives With: Spouse Ashleigh    Type of Home: House in [address] [place] [address] Street: One level (basement)    Home Access: Stairs to enter without rails (hoolds on to door jamb)    Entrance Stairs - Number of Steps: 2    Bathroom Shower/Tub: Walk-in shower    Home Equipment: Walker, rolling, Rollator, [employer] (uses rollator outside)    Has the patient had two or more falls in the past year or any fall with injury in the past year?: Yes (broken rib and puncture lung)    ADL Assistance: Independent    Homemaking Assistance: Needs assistance (staniding tolerance is limited)    Ambulation Assistance: Independent (ww)    Transfer Assistance: Independent    Active : No    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[employer]    Additional Comments: right handed         Social Determinants of Health     Financial Resource Strain: Not on [address]: Not on file   Transportation Needs: Not on file   Physical Activity: Not on file   Stress: Not on file   Social Connections: Not on file   Intimate Partner Violence: Not on file   Housing Stability: Not on file     Family History   Problem Relation Age of Onset    Heart Attack Mother     Other Mother          in MVA    Other Father          at age 80    Other Sister          as infant    Cancer Brother     Other Brother         unknown medical hx    Other Brother          at age 61 due to accident    Cancer Daughter         colon & lung cancer    Colon Cancer Daughter     No Known Problems Son     Colon Cancer Other     Breast Cancer Other      Allergies   Allergen Reactions    Benadryl [Diphenhydramine Hcl] Anxiety    Diphenhydramine Anxiety     anxious     Current Facility-Administered Medications on File Prior to Encounter   Medication Dose Route Frequency Provider Last Rate Last Admin    sodium chloride flush 0.9 % injection 10 mL  10 mL IntraVENous PRN Dione Washburn MD   10 mL at 07/10/19 1220     Current Outpatient Medications on File Prior to Encounter   Medication Sig Dispense Refill    carboxymethylcellulose (THERATEARS) 1 % ophthalmic gel Apply to eye      melatonin 5 MG TABS tablet Take by mouth      enoxaparin (LOVENOX) 100 MG/ML Inject into the skin 2 times daily      metoprolol tartrate (LOPRESSOR) 50 MG tablet Take 25 mg by mouth 2 times daily      Elastic Bandages & Supports (MEDICAL COMPRESSION STOCKINGS) MISC 1 each by Does not apply route daily Thigh high 20-30 mmhg graduated compression stockings both legs wear daily during day and off Qhs. Wear as tolerated. Do not wear if they cause increased pain. 1 each 5    losartan (COZAAR) 100 MG tablet Take 100 mg by mouth in the morning. potassium chloride (KLOR-CON M) 20 MEQ extended release tablet Take 20 mEq by mouth in the morning and 20 mEq before bedtime.       rosuvastatin (CRESTOR) 10 MG tablet TAKE 1 TABLET BY MOUTH EVERY DAY      furosemide (LASIX) 20 MG tablet Take 20 mg by mouth daily      warfarin (COUMADIN) 2.5 MG tablet Take 4 mg by mouth daily Indications: T--Sat - Sun Daily per INR levels    nitroGLYCERIN (NITROSTAT) 0.4 MG SL tablet Place 0.4 mg under the tongue every 5 minutes as needed for Chest pain up to max of 3 total doses. If no relief after 1 dose, call 911.      Omega-3 Fatty Acids (FISH OIL) 1200 MG CAPS Take by mouth daily      Cholecalciferol (VITAMIN D3) 125 MCG (5000 UT) TABS Take by mouth daily      CPAP Machine MISC by Does not apply route      warfarin (COUMADIN) 5 MG tablet Take 5 mg by mouth daily Indications: Xkstza-Cchhymbzp-Rghmep Daily per INR levels      aspirin 81 MG EC tablet Take 81 mg by mouth daily.             Review of Systems  General: Denies fever, chills  Cardiovascular: Denies chest pain  Pulmonary: Denies shortness of breath  GI: Denies nausea or vomiting  Neuro: Denies numbness or tingling    Objective:   /68   Pulse 70   Temp 98.7 °F (37.1 °C) (Oral)   Resp 17   Ht 5' 7\" (1.702 m)   Wt 263 lb 6.4 oz (119.5 kg)   SpO2 97%   BMI 41.25 kg/m²     Ortho Exam  Left upper extremity: Passive external rotation of the left shoulder to 0 degrees  Passive internal rotation of the left shoulder to abdomen  Forward flexion to 40 degrees  Tender to palpation about the Anterolateral Shoulder girdle  Painful range of motion with Terminal extents of limited motion  Globally weak across the rotator cuff musculature  Neuro: Able to perform all Cardinal hand movements distally.  Able to extend at the wrist, extend at the thumb, flex at the IP joint of the thumb and index finger, able to cross and on cross fingers without difficulty and as such able to abduct and adduct the fingers well.  Sensation intact light touch in the radial, median, and ulnar nerve distribution.    Using clean technique, the bony landmarks of the left shoulder were palpated.  An anterior injection site was used after the skin was cleaned with iodine swabs.  Injection using a 22-gauge needle with 9 cc of 1% lidocaine without epinephrine and 1 cc of 40 mg of kenalog was used.  Hemostasis was  achieved and a Band-Aid was applied cleanly. Patient tolerated procedure well and remained at neurovascular baseline distally after injection. 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 Patient was given options and elected for injection of the left glenohumeral joint. Patient tolerated injection well. Instructed to mobilize shoulder over the next 24 to 48 hours to distribute medication and allow medication to take effect. If patient wishes to receive additional injections can follow-up on an outpatient basis with Dr. Dawson Mendoza for further care. Orders Placed This Encounter   Procedures    Culture, Body Fluid     Please draw culture off pleurx cath today     Standing Status:   Standing     Number of Occurrences:   1    COVID-19, Rapid     Testing the floor     Standing Status:   Standing     Number of Occurrences:   1     Order Specific Question:   Is this test for diagnosis or screening? Answer:   Screening     Order Specific Question:   Symptomatic for COVID-19 as defined by CDC? Answer:   No     Order Specific Question:   Reason for Test     Answer:   Upcoming elective surgery/procedure/delivery, return to work, or discharge to another facility     Order Specific Question:   Date of Symptom Onset     Answer:   N/A     Order Specific Question:   Hospitalized for COVID-19? Answer:   No     Order Specific Question:   Admitted to ICU for COVID-19? Answer:   No     Order Specific Question:   Employed in healthcare setting? Answer:   Unknown     Order Specific Question:   Resident in a congregate (group) care setting? Answer:   No     Order Specific Question:   Previously tested for COVID-19? Answer:   Yes     Order Specific Question:   Pregnant: Answer:   No    COVID-19, Rapid     Standing Status:   Standing     Number of Occurrences:   1     Order Specific Question:   Is this test for diagnosis or screening? Answer:   Diagnosis of ill patient     Order Specific Question:   Symptomatic for COVID-19 as defined by CDC? Answer:   Yes     Order Specific Question:   Date of Symptom Onset     Answer:   3/1/2023     Order Specific Question:   Hospitalized for COVID-19? Answer:   Yes     Order Specific Question:   Admitted to ICU for COVID-19? Answer:   No     Order Specific Question:   Previously tested for COVID-19? Answer:   Yes     Order Specific Question:   Pregnant: Answer:   No    COVID-19, Rapid     Standing Status:   Standing     Number of Occurrences:   1     Order Specific Question:   Is this test for diagnosis or screening? Answer:   Diagnosis of ill patient     Order Specific Question:   Symptomatic for COVID-19 as defined by CDC? Answer:   Unknown     Order Specific Question:   Date of Symptom Onset     Answer:   N/A     Order Specific Question:   Hospitalized for COVID-19? Answer:   No     Order Specific Question:   Admitted to ICU for COVID-19? Answer:   No     Order Specific Question:   Employed in healthcare setting? Answer:   No     Order Specific Question:   Resident in a congregate (group) care setting? Answer:   No     Order Specific Question:   Previously tested for COVID-19? Answer:   Yes     Order Specific Question:   Pregnant:      Answer:   No    COVID-19, Rapid     Standing Status:   Standing     Number of Occurrences:   1     Order Specific Question:   Is this test for diagnosis or screening? Answer:   Diagnosis of ill patient     Order Specific Question:   Symptomatic for COVID-19 as defined by CDC? Answer:   Unknown     Order Specific Question:   Date of Symptom Onset     Answer:   N/A     Order Specific Question:   Hospitalized for COVID-19? Answer:   No     Order Specific Question:   Admitted to ICU for COVID-19? Answer:   No     Order Specific Question:   Previously tested for COVID-19? Answer:   Yes     Order Specific Question:   Pregnant: Answer:   No    CT HUMERUS LEFT WO CONTRAST     Standing Status:   Standing     Number of Occurrences:   1     Order Specific Question:   Reason for exam:     Answer:   Pain/limited mobility    CT SHOULDER LEFT WO CONTRAST     Standing Status:   Standing     Number of Occurrences:   1     Order Specific Question:   Reason for exam:     Answer:   pain/ unable to raise arm    US DUP UPPER EXTREMITY LEFT VENOUS     Standing Status:   Standing     Number of Occurrences:   1     Order Specific Question:   Reason for exam:     Answer:   r/o DVT    Procalcitonin     Standing Status:   Standing     Number of Occurrences:   1    Protime-INR     Standing Status:   Standing     Number of Occurrences:   6    Basic Metabolic Panel     Standing Status:   Standing     Number of Occurrences:   1    CBC with Auto Differential     Standing Status:   Standing     Number of Occurrences:   1    COVID-19     Standing Status:   Standing     Number of Occurrences:   1    Protime-INR     Standing Status:   Standing     Number of Occurrences:   3    ADULT DIET;  Regular     Standing Status:   Standing     Number of Occurrences:   1     Order Specific Question:   Primary Diet     Answer:   Regular    Notify physician     Notify physician for pulse less than 50 or greater than 120, respiratory rate less than 12 or greater than 25, oral temperature greater than 101.3 F (38.5 C) , urinary output less than 120 mL in four hours, systolic BP less than 90 or greater than 766, diastolic BP less than 50 or greater than 100. Standing Status:   Standing     Number of Occurrences:   1    Up with assistance     Standing Status:   Standing     Number of Occurrences:   72618    Vital signs per unit routine     Standing Status:   Standing     Number of Occurrences:   1    Vital signs per unit routine     Orthostatic (blood pressure and pulse taken with manual cuff) in a.m. x1 then discontinue. Standing Status:   Standing     Number of Occurrences:   1    Notify physician     Notify physician for pulse less than 50 or greater than 120, respiratory rate less than 12 or greater than 25, oral temperature greater than 101.3 F (38.5 C) , urinary output less than 120 mL in four hours, systolic BP less than 90 or greater than 189, diastolic BP less than 50 or greater than 100. Standing Status:   Standing     Number of Occurrences:   1    Up with assistance     Standing Status:   Standing     Number of Occurrences:   83425    Weekly weight     Standing Status:   Standing     Number of Occurrences:   1    Intake and output     Times 48 hours then discontinue unless on fluid restrictions, acute renal within 30 days, recent CHF within 30 days or if on peritoneal dialysis then do every shiff. Call for urine ouput less than 120ml in 4 hours     Standing Status:   Standing     Number of Occurrences:   6    Encourage deep breathing and coughing every two hours while awake     Standing Status:   Standing     Number of Occurrences:   1    Bladder training: Discontinue indwelling catheter if one in place     Remove 72 hr prior to discharge date     Standing Status:   Standing     Number of Occurrences:   1    Bladder training: Bladder scan, initially perform after voiding     Bladder scan, initially perform after voiding. If bladder scan is greater than 300 mL, perform intermittent catheterization for post void residual (PVR).  Record date, time, and amount on nursing flowsheet. Continue process of bladder scanning and intermittent catheterizations until 3 consecutive PVR less than 100 mL. Standing Status:   Standing     Number of Occurrences:   39270    Turn patient     Standing Status:   Standing     Number of Occurrences:   1    JOB hose daily-remove at HS and replace in am     At bedtime     Standing Status:   Standing     Number of Occurrences:   1    Place intermittent pneumatic compression device     Standing Status:   Standing     Number of Occurrences:   1    Drain care     Pleurx to be drained every M/W/Fr and as needed for SOB     Standing Status:   Standing     Number of Occurrences:   1    Turn or assist with turn approximately every 2 hours if patient is unable to turn self. Remind patient to turn if necessary     Standing Status:   Standing     Number of Occurrences:   1    Maintain HOB at the lowest elevation consistent with medical plan of care     Standing Status:   Standing     Number of Occurrences:   1    Assess skin per unit guidelines     Standing Status:   Standing     Number of Occurrences:   1    Maintain heels off of bed at all times     Standing Status:   Standing     Number of Occurrences:   1    Pad/offload medical devices     Standing Status:   Standing     Number of Occurrences:   1    Apply heat to affected area     Apply to SVT/Left humerus as needed     Standing Status:   Standing     Number of Occurrences:   1    Full Code     Standing Status:   Standing     Number of Occurrences:   1    Inpatient consult to Infectious Diseases     Standing Status:   Standing     Number of Occurrences:   1     Order Specific Question:   Reason for Consult? Answer:   positive culture for pleural fluid with pleurx cath    Inpatient consult to Thoracic Surgery     Standing Status:   Standing     Number of Occurrences:   1     Order Specific Question:   Reason for Consult?      Answer:   loculated pleural effuision    Inpatient consult to Hospitalist Standing Status:   Standing     Number of Occurrences:   1     Order Specific Question:   Reason for Consult? Answer:   Medical management    Inpatient consult to Case Management     Assist with asesment of social issues and barriers to dc and help with placement and home to transition to independent living. Standing Status:   Standing     Number of Occurrences:   1     Order Specific Question:   Reason for Consult? Answer: Other (Comment)    Inpatient consult to Dietitian     Nutritional counseling re improved protein status, low carb diet, achieve ideal body weight, set and meet dietary goals. Standing Status:   Standing     Number of Occurrences:   1     Order Specific Question:   Reason for Consult? Answer:   Eval and Treat    Inpatient consult to Recreation Therapy     Improve patient functional status, community re-entry, and satisfaction with life. Standing Status:   Standing     Number of Occurrences:   1     Order Specific Question:   Reason for Consult? Answer:   Eval and Treat    Consult to Hospitalist     Either today or tomorrow     Standing Status:   Standing     Number of Occurrences:   1     Order Specific Question:   Reason for Consult? Answer:   Eval and Treat     Order Specific Question:   Provider Contacted? Answer:   No     Order Specific Question:   When to contact consulting provider     Answer:   As soon as possible    Pharmacy to Dose: Warfarin     Standing Status:   Standing     Number of Occurrences:   1     Order Specific Question:   What is the patient's goal INR? Answer:   2.0 - 3.0    Inpatient consult to Orthopedic Surgery     Standing Status:   Standing     Number of Occurrences:   1     Order Specific Question:   Reason for Consult? Answer:   left shoulder injection     Order Specific Question:   Provider Contacted? Answer:   No     Order Specific Question:   When to contact consulting provider     Answer:    Today    OT sandra and treat     Standing Status:   Standing     Number of Occurrences:   1    OT eval and treat     Consult OT for evaluation/assessment and initiation of a therapeutic program.  OT not to shower patient until seen by Rehab  on morning after admission. Standing Status:   Standing     Number of Occurrences:   1     Order Specific Question:   Reason for OT? Answer:   Eval and Treat    OT eval and treat     Standing Status:   Standing     Number of Occurrences:   1    PT eval and treat     Standing Status:   Standing     Number of Occurrences:   1    PT evaluation and treat     Consult PT for evaluation/assessment and initiation of a therapeutic program.     Standing Status:   Standing     Number of Occurrences:   1     Order Specific Question:   Reason for PT? Answer:   Eval and Treat    PT eval and treat     Standing Status:   Standing     Number of Occurrences:   1    Acapella     Standing Status:   Standing     Number of Occurrences:   13    Home BIPAP or CPAP     Patient to bring in the BIPAP or CPAP machine that they are currently using at home and use that device with the same settings as at home and same supplemental oxygen if used at home. Standing Status:   Standing     Number of Occurrences:   13    Initiate Oxygen Therapy Protocol     Initiate oxygen therapy if the patient has 1) SpO2 is less than 90%, 2) Cyanosis, Chest Pain, Dyspnea, or Altered level of consciousness AND SpO2 checked and it is less than 90%, or 3) patient on Home oxygen. To initiate oxygen therapy: nurse enters RT51 Nasal cannula oxygen order using Per Protocol order mode (if indication Home Oxygen then change L/min to same amount at home and change Wean to Room Air to No). Notify provider if initiate oxygen therapy unless already on Home Oxygen.          Standing Status:   Standing     Number of Occurrences:   13    Pulse Oximetry Spot Check     If shortness of breath develops, check stat pulse oximeter, add nasal cannula oxygen up to three liters, check stat vital signs, notify physician. Standing Status:   Standing     Number of Occurrences:   1    SLP eval and treat     Standing Status:   Standing     Number of Occurrences:   1     Order Specific Question:   Reason for SLP? Answer:   Eval and treat for cognitive, behavioral, speech and swallowing deficits    SLP eval and treat     Standing Status:   Standing     Number of Occurrences:   1    Admit to Inpatient Rehab     Standing Status:   Standing     Number of Occurrences:   1     Order Specific Question:   Discharge Plan:     Answer:   Home with Office Follow-up    Fall precautions     Standing Status:   Standing     Number of Occurrences:   1     Orders Placed This Encounter   Medications    acetaminophen (TYLENOL) tablet 650 mg    aspirin EC tablet 81 mg    DISCONTD: enoxaparin (LOVENOX) injection 120 mg     Order Specific Question:   Indication of Use     Answer:   Treatment-DVT/PE    furosemide (LASIX) tablet 20 mg    guaiFENesin (MUCINEX) extended release tablet 600 mg    ipratropium-albuterol (DUONEB) nebulizer solution 1 ampule     Order Specific Question:   Initiate RT Bronchodilator Protocol     Answer:   Yes - Inpatient Protocol    melatonin disintegrating tablet 5 mg    DISCONTD: meropenem (MERREM) 1,000 mg in sodium chloride 0.9 % 100 mL IVPB (mini-bag)     Order Specific Question:   Antimicrobial Indications     Answer:    Other     Order Specific Question:   Other Abx Indication     Answer:   pleural efffusion    metoprolol tartrate (LOPRESSOR) tablet 25 mg    naloxegol (MOVANTIK) tablet 25 mg    OR Linked Order Group     ondansetron (ZOFRAN-ODT) disintegrating tablet 4 mg     ondansetron (ZOFRAN) injection 4 mg    OR Linked Order Group     oxyCODONE (ROXICODONE) immediate release tablet 5 mg     oxyCODONE (ROXICODONE) immediate release tablet 10 mg    rosuvastatin (CRESTOR) tablet 10 mg    DISCONTD: warfarin (COUMADIN) tablet 4 mg     Order Specific Question:   Indication of Use     Answer:   Treatment-DVT/PE     Order Specific Question:   What is the patient's goal INR? Answer:   2.0 - 3.0    DISCONTD: warfarin (COUMADIN) tablet 5 mg     Order Specific Question:   Indication of Use     Answer:   Treatment-DVT/PE     Order Specific Question:   What is the patient's goal INR? Answer:   2.0 - 3.0    potassium chloride (KLOR-CON M) extended release tablet 40 mEq    DISCONTD: warfarin placeholder: dosing by provider     Order Specific Question:   What is the patient's goal INR? Answer:   2.0 - 3.0    Vitamin D (CHOLECALCIFEROL) tablet 2,000 Units    cyanocobalamin injection 1,000 mcg    coenzyme Q10 capsule 100 mg    DISCONTD: lidocaine 4 % external patch 3 patch    DISCONTD: acetaminophen (TYLENOL) tablet 650 mg    bisacodyl (DULCOLAX) suppository 10 mg    sodium phosphate (FLEET) rectal enema 1 enema    warfarin placeholder: dosing by pharmacy     Order Specific Question:   What is the patient's goal INR? Answer:   2.0 - 3.0    warfarin (COUMADIN) tablet 2 mg     Order Specific Question:   Indication of Use     Answer:   A Fib/A Flutter     Order Specific Question:   What is the patient's goal INR? Answer:   2.0 - 3.0    lidocaine 4 % external patch 3 patch    warfarin (COUMADIN) tablet 2.5 mg     Order Specific Question:   Indication of Use     Answer:   A Fib/A Flutter     Order Specific Question:   What is the patient's goal INR? Answer:   2.0 - 3.0    polyvinyl alcohol (LIQUIFILM TEARS) 1.4 % ophthalmic solution 1 drop    warfarin (COUMADIN) tablet 4 mg     Order Specific Question:   Indication of Use     Answer:   A Fib/A Flutter     Order Specific Question:   What is the patient's goal INR?      Answer:   2.0 - 3.0    guaiFENesin-dextromethorphan (ROBITUSSIN DM) 100-10 MG/5ML syrup 10 mL    hydrocortisone (ANUSOL-HC) suppository 25 mg    warfarin (COUMADIN) tablet 2 mg     Order Specific Question:   Indication of Use     Answer:   A Fib/A Flutter     Order Specific Question:   What is the patient's goal INR? Answer:   2.0 - 3.0    potassium chloride (MICRO-K) extended release capsule 10 mEq    camphor-menthol-methyl salicylate (BENGAY ULTRA STRENGTH) 4-10-30 % cream    warfarin (COUMADIN) tablet 2 mg     Order Specific Question:   Indication of Use     Answer:   A Fib/A Flutter     Order Specific Question:   What is the patient's goal INR? Answer:   2.0 - 3.0    acetaminophen (TYLENOL) tablet 650 mg       No follow-ups on file. No name on file.

## 2023-03-06 NOTE — PROGRESS NOTES
Nutrition Assessment     Type and Reason for Visit: Reassess    Nutrition Recommendations/Plan:   Recommend low Na+ diet restriction     Malnutrition Assessment:  Malnutrition Status: No malnutrition    Nutrition Assessment:  Pt is stable from a nutritional standpoint with verbalized good appetite, noted good intake at meals, with no nutritional complaints. Recommend 2gm Sodium diet restriction d/t hx of CHF, edema present, recieving lasix. Pt/wife state that pt follows a low Na+ diet at home. Estimated Daily Nutrient Needs:  Energy (kcal):  8849-6503 (kg x 14-15)         Nutrition Related Findings:   PMH-CVA, CHF, htn, hld, Mesothelioma; adm with post laminectomy syndrome. 2/21-egd  with gastritis, small hernia. Labs/meds reviewed. 2/25 Na-140, K-3.9. Pt recieving lasix. +1 BUE and +2 BLE edema noted. Last BM noted 3/5. Current Nutrition Therapies:    ADULT DIET;  Regular (%)    Anthropometric Measures:  Height: 5' 7\" (170.2 cm)  Current Body Wt:   263lb 6.4oz (adm, bedscale)  BMI:  41.25    Nutrition Diagnosis:   Overweight/Obese related to excessive energy intake as evidenced by BMI    Nutrition Interventions:   Food and/or Nutrient Delivery:  (Recommend low Na+ diet restriction d/t hx of CHF, noted edema and diuretic tx)  Nutrition Education/Counseling: No recommendation at this time  Coordination of Nutrition Care: Continue to monitor while inpatient       Goals:     Goals: PO intake 50% or greater       Nutrition Monitoring and Evaluation:      Food/Nutrient Intake Outcomes: Food and Nutrient Intake  Physical Signs/Symptoms Outcomes: Weight, Biochemical Data, Fluid status/edema    Kelly Ghohs RD, LD

## 2023-03-06 NOTE — PROGRESS NOTES
Assessment completed. Pt C/O pain  L Shoulder @ \"4\". Medicated with HS meds for pain. Pt assisted to BR x1- pt CGA, unsteady and continues to be SOB with exertion. Voided   soft formed brown Med stool. K-pad applied to L shoulder. Pt is on 3L oxygen-CPAP for HS. VS Stable. Call light with in reach. Bed low and locked. Bed alarm activated.

## 2023-03-06 NOTE — PROGRESS NOTES
Subjective: The patient complains of moderate to severe acute on chronic progressive fatigue and  PRIETO partially relieved by rest, medications, PT,  OT,   SLP and rest and exacerbated by recent illness  . Patient had initially been treated at [facility] [place] where a nerve stimulator placement was attempted but delayed due to high INR. Once the procedure was finally initiated it was aborted because of possible CSF leak. Patient became shortness of breath postprocedure but this was found to be largely unrelated to the procedure but related to a large loculated pleural effusion. Patient was transferred to [facility] for thoracotomy and VATS procedure performed by Dr. Ruthanne Meckel. As noted patient had a chest tube inserted by Dr. Ruthanne Meckel on 2/18/2023. He has been followed by thoracic surgery pulmonology oncology GI and cardiology as well as the hospitalist.     From a thoracic surgery standpoint cultures were sent from his pleural effusion which were positive and he is now on IV antibiotics. The left a Pleurx tube in O2   drain 3 times a week. The plan is for outpatient removal of the drain and if any worsening symptoms occur after the Pleurx is removed possible VATS surgery. They are trying to avoid surgery for now. Pulmonology followed him advising titrating O2 to keep sats above 90 continue home CPAP and continue draining the Pleurx daily. 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 He they noted his symptoms were improving and he has a history of following with Dr. Gene Butst at [facility] regarding his mesothelioma. Discussed options regarding possible chemotherapy palliative care intermittent drainage of the Pleurx catheter and possible hospice care. Currently they are advising treating anemia with IV Venofer here.   Cardiology was consulted and obtain an echocardiogram to assess the pericardial effusion    I am concerned about patients medical complexities and barriers to advancing in rehab goals including  improved pain control. He is complaining of flareup in his left shoulder x-rays reviewed. I would suggest heating pad, Lidoderm, pain medication and a left shoulder injection. I reviewed current care and plans for further care with other rehab providers including nursing and case management. According to recent nursing note, \"  Pt weight up from 3/2 @256. 3- to today @ 263. 4. pt up x2 to urinate this shift. Pt was getting up much more last wk during HS. Call light in reach pt states shoulder @ \"4\"- \"better\". .          ROS x10: The patient also complains of severely impaired mobility and activities of daily living. Otherwise no new problems with vision, hearing, nose, mouth, throat, dermal, cardiovascular, GI, , pulmonary, musculoskeletal, psychiatric or neurological. See also Acute Rehab PM&R H&P. Vital signs:  /68   Pulse 70   Temp 98.7 °F (37.1 °C) (Oral)   Resp 18   Ht 5' 7\" (1.702 m)   Wt 263 lb 6.4 oz (119.5 kg)   SpO2 97%   BMI 41.25 kg/m²   I/O:   PO/Intake:  fair PO intake,   Reg diet    Bowel:   continent LBM 3/1  Bladder: continent    bishop  General:  Patient is well developed,   adequately nourished, and    well kempt. HEENT:    Pupils equal, hearing intact to loud voice, external inspection of ear and nose benign. Inspection of lips, tongue and gums  thrush    Musculoskeletal: No significant change in strength or tone. All joints stable. Inspection and palpation of digits and nails show no clubbing, cyanosis or inflammatory conditions. Neuro/Psychiatric: Affect: flat but pleasant. Alert and oriented to person, place and situation with  min cues. No significant change in deep tendon reflexes or sensation  Lungs:  Diminished, CTA-B. Respiration effort is   normal at rest.   Pleurx cath dressing D&I L Side. Heart:   S1 = S2,   RRR.        Abdomen:  Soft, non-tender, no enlargement of liver or spleen. Extremities:   mod lower extremity edema    Skin:   Intact to general survey,  Pleurx cath dressing D&I L Side. Rehabilitation:  Physical Therapy:   Bed mobility:  Bed mobility  Rolling to Left: Minimal assistance (02/26/23 1242)  Rolling to Right: Minimal assistance (02/26/23 1242)  Supine to Sit: Moderate assistance (02/26/23 1242)  Sit to Supine: Moderate assistance (02/26/23 1242)  Bed Mobility Comments: HOB flat; cues to avoid breath holding (02/26/23 1242)  Roll Left  Assistance Level: Stand by assist (03/03/23 1120)  Skilled Clinical Factors: With use of bed rail (03/02/23 1317)  Roll Right  Assistance Level: Stand by assist (03/03/23 1120)  Skilled Clinical Factors: With use of bed rail (03/02/23 1317)  Sit to Supine  Assistance Level: Stand by assist (03/03/23 1120)  Skilled Clinical Factors: Increased time to complete (03/02/23 1317)  Supine to Sit  Assistance Level: Stand by assist (03/03/23 1120)  Skilled Clinical Factors: Increased time and effort (03/02/23 1317)  Scooting  Assistance Level: Stand by assist (03/03/23 1120)  Transfers:  Transfers  Sit to Stand:  Moderate Assistance (02/26/23 1243)  Stand to Sit: Moderate Assistance (02/26/23 1243)  Comment: poor use of L LE during sit to stand; [employer] used (02/26/23 1243)  Sit to Stand  Assistance Level: Stand by assist;Supervision (03/04/23 1511)  Skilled Clinical Factors: cont multiple attempts to stand (03/04/23 1511)  Stand to Sit  Assistance Level: Supervision (03/04/23 1511)  Skilled Clinical Factors: completed safely without vc (03/04/23 1511)  Bed To/From Chair  Technique: Stand step (03/02/23 1317)  Assistance Level: Stand by assist;Supervision (03/03/23 1120)  Skilled Clinical Factors: Increased time to complete requires use of UE's to complete (03/02/23 1317)  Car Transfer  Assistance Level: Minimal assistance (02/27/23 0942)  Skilled Clinical Factors: [place] to rise from low level chair able to lift BLE in and out of car (02/27/23 0942)  Gait:   Ambulation  Surface: Level tile (03/02/23 1406)  Device: Rolling Walker (03/02/23 1406)  Other Apparatus: O2 (03/02/23 1406)  Assistance: Contact guard assistance;Stand by assistance (03/02/23 1406)  Quality of Gait: cues for posture and upward gaze, decreased step length and height (03/02/23 1406)  Gait Deviations: Slow Jefe;Increased MENA;Decreased step length;Decreased step height (02/26/23 1244)  Distance: 100' (03/02/23 1406)  Ambulation  Surface: Carpet (03/04/23 1517)  Device: Rolling walker (03/04/23 1517)  Distance: 75 feet (03/04/23 1517)  Activity: Within Unit (03/04/23 1517)  Activity Comments: decresaed hs bilat, decreased step length, vc for upward gaze (03/04/23 1517)  Additional Factors: Verbal cues (03/04/23 1517)  Assistance Level: Supervision (03/04/23 1517)  Gait Deviations: Slow jefe;Decreased heel strike left;Decreased step length bilateral (03/04/23 1517)  Skilled Clinical Factors: 2 liters o2 (03/04/23 1517)  Stairs:  Stairs/Curb  Stairs?: No (03/02/23 1406)  Stairs  Stair Height: 6'' (03/03/23 1615)  Device: Bilateral handrails (03/03/23 1615)  Number of Stairs: 4 (03/03/23 1615)  Additional Factors: Verbal cues;Non-reciprocal going down;Reciprocal going up (03/03/23 1615)  Assistance Level: Stand by assist (03/02/23 1214)  Skilled Clinical Factors: Completed with 2L 93% post stair negotiation improved 2 96% with seated rest break (03/02/23 1214)  W/C mobility:       Occupational Therapy:   Hand Dominance: Right  ADL  Feeding: Stand by assistance (02/26/23 1201)  Grooming: Minimal assistance (02/26/23 1201)  UE Bathing: Minimal assistance (02/26/23 1201)  LE Bathing: Maximum assistance (02/26/23 1201)  LE Bathing Skilled Clinical Factors: periare only per pt request (02/26/23 1201)  UE Dressing: Moderate assistance (02/26/23 1201)  LE Dressing: Maximum assistance (02/26/23 1201)  Toileting: Maximum assistance (02/26/23 1201)  Additional Comments: sponge bath ADL completed  sitting EOB and patially on toilet. (02/26/23 1201)  Toilet Transfers  Toilet - Technique: Ambulating (02/26/23 1207)  Equipment Used: Grab bars (02/26/23 1207)  Toilet Transfer: Moderate assistance (02/26/23 1207)  Toilet Transfers Comments: CGA on ModA off (02/26/23 1207)          Speech Therapy:      Comprehension:  (AUditory comprehension is [facility])  Verbal Expression:  (Mild to moderate deficits noted with divergent and convergent naming tasks. Min assist required to improve naming skills.)  Diet/Swallow:           Compensatory Swallowing Strategies : Alternate solids and liquids, Eat/Feed slowly, Upright as possible for all oral intake, Small bites/sips          Lab/X-ray studies reviewed, analyzed and discussed with patient and staff:   Recent Results (from the past 24 hour(s))   COVID-19, Rapid    Collection Time: 03/05/23  2:07 PM    Specimen: Nasopharyngeal Swab; Nares   Result Value Ref Range    SARS-CoV-2, NAAT Not Detected Not Detected   Protime-INR    Collection Time: 03/06/23  4:16 AM   Result Value Ref Range    Protime 32.3 (H) 12.3 - 14.9 sec    INR 3.1      3/5/23-CT  Left shoulder   Advanced osteoarthritis of the glenohumeral joint with intra-articular   ossified bodies. 2.  No acute fracture identified. 3.  Calcific tendinitis versus bursitis of the rotator cuff tendon,   subacromial subdeltoid bursa. XR ABDOMEN   2/18/2023   1. Complete opacification of left hemithorax compatible with some combination of pleural effusion and atelectasis. Secondary obscuration of left heart border. 2.  Nonobstructive bowel gas pattern. No acute abdominal disease identified. 3.  Probable left renal stone. CT CHEST   2/23/2023 : Mediastinum: Thyroid is homogeneous in appearance. Vascular calcifications seen within the coronary vessels. Cardiac chambers are mildly enlarged. Pacer leads in satisfactory position. Moderate-sized pericardial effusion.  Bulky adenopathy identified in the left hilar region likely reactive. Small lymph nodes seen scattered throughout the mediastinum likely reactive. Atherosclerotic disease seen diffusely throughout the thoracic aorta. Lungs/pleura: There is small chest tube identified in place on the left with small left pleural effusion. Airspace consolidation seen within the left upper and lower lung fields suggesting superimposed infiltrate such as pneumonia. Mild increased markings identified at the right lung base to suggest atelectatic change. Trace pneumothorax identified anteriorly. Upper Abdomen: Stones in the gallbladder. Small hiatal hernia. Soft Tissues/Bones: Multilevel degenerative changes seen within the spine. No acute chest wall abnormality. Indwelling chest tube identified on the left with small left pleural effusion and trace anterior pneumothorax. Consolidation seen in airspace disease throughout the left upper and lower lobes to suggest a multifocal superimposed pneumonia. Soft tissue density seen in the hilar region to suggest possible reactive adenopathy. Moderate-sized pericardial effusion. Minimal atelectatic changes seen at the right lung base. Incidental stones in the gallbladder with small hiatal hernia. XR CHEST   2/19/2023   1. Minimal partial clearing of the left lung. The previously noted left pleural effusion is smaller   2. Stable position of the left chest tube   3. The right lung is clear. XR CHEST  2/18/2023   1. Apparent interval placement of left chest tube catheter. No pneumothorax. 2.  Persistent opacification of the left hemithorax. XR CHEST  2/18/2023   1. Near complete whiteout of the left hemithorax likely represent a combination of partial collapse of the left lung and large pleural effusion   2. Cardiomegaly   3. The right lung is grossly clear. US DUP UPPER EXTREMITY RIGHT VENOUS  2/20/2023   No evidence of DVT.      FLUORO FOR SURGICAL PROCEDURES  2/16/2023  12 spot images of the lumbar spine were obtained.     Intraprocedural fluoroscopic spot images as above.  See separate procedure report for more information.     EGD 2023  [facility] Patient: COSME LEMON MRN: [id number] : 1935 Account: Gender: Male Age: 87 Years Procedure: Upper GI endoscopy Date: 2023 Attending Physician: Chance Chen Indications:        -  Anemia        -  Melena Medications:        -  Monitored Anesthesia Care Complications:        -  No immediate complications. Estimated Blood Loss:        -  Estimated blood loss: none. Procedure:        - The Gastroscope was introduced through the mouth and advanced to the second           part of the duodenum.        -  The patient tolerated the procedure well. Findings:        -  A 2 cm hernia was found.        -  Esophagogastric landmarks were identified: the gastroesophageal junction           was found at 36 cm, the lower esophageal sphincter was found at 38 cm and the           upper extent of the gastric folds was found at 36 cm from the incisors.        -  Patchy moderate inflammation characterized by erythema was found in the           gastric antrum and in the gastric body.        -  The examined duodenum was normal.        -  The cardia and gastric fundus were normal on retroflexion.        - No old or fresh blood noted Impression:        -  2 cm hernia.        -  Esophagogastric landmarks identified.        -  Gastritis.        -  Normal examined duodenum.        -  No specimens collected.        - No old or fresh blood noted Recommendation:        -  Put patient on a clear liquid diet starting today.        -  Continue present medications.        -     - 84116, Esophagogastroduodenoscopy, flexible, transoral; diagnostic,           including collection of specimen(s) by brushing or washing, when performed           (separate procedure) Diagnosis Code(s):        - D64.9, Anemia, unspecified        - K92.1, Melena (includes Hematochezia)        -  K44.9, Diaphragmatic hernia without obstruction or gangrene        - K29.70, Gastritis, unspecified, without bleeding           Previous extensive, complex labs, notes and diagnostics reviewed and analyzed. ALLERGIES:    Allergies as of 02/25/2023 - Fully Reviewed 02/25/2023   Allergen Reaction Noted    Benadryl [diphenhydramine hcl] Anxiety 01/31/2012    Diphenhydramine Anxiety 05/09/2018      (please also verify by checking MAR)        Today I evaluated this patient for periodic reassessment of medical and functional status. The patient was discussed in detail at the treatment team meeting focusing on current medical issues, progress in therapies, social issues, psychological issues, barriers to progress and strategies to address these barriers, and discharge planning. See the addendum to rehab progress note-as a second progress note in the chart. The patient continues to be high risk for future disability and their medical and rehabilitation prognosis continue to be good and therefore, we will continue the patient's rehabilitation course as planned. The patient's tentative discharge date was set. Patient and family education was discussed. The patient was made aware of the team discussion regarding their progress. Complex Physical Medicine & Rehab Issues Assess & Plan:   Severe abnormality of gait and mobility and impaired self-care and ADL's secondary to progressive cardiopulmonary debility. Functional and medical status reassessed regarding patients ability to participate in therapies and patient found to be able to participate in acute intensive comprehensive inpatient rehabilitation program including PT/OT to improve balance, ambulation, ADLs, and to improve the P/AROM. Therapeutic modifications regarding activities in therapies, place, amount of time per day and intensity of therapy made daily. In bed therapies or bedside therapies prn.    Bowel and Bladder dysfunction  , Neurogenic bowel and bladder:  frequent toileting, ambulate to bathroom with assistance, check post void residuals. Check for C.difficile x1 if >2 loose stools in 24 hours, continue bowel & bladder program.  Monitor bowel and bladder function. Lactinex 2 PO every AC. MOM prn, Brown Bomb prn, Glycerin suppository prn, enema prn. Encourage therapy and nursing to co-treat and problem solve re continence. Severe back pain, lumbar, as well as generalized OA pain: reassess pain every shift and prior to and after each therapy session, give prn Tylenol and consider scheduled Tylenol, modalities prn in therapy, masage, Lidoderm, K-pad prn. Consider scheduled AM pain meds. Consult Ortho for left shoulder injection  Skin healing  ears and breakdown risk:  continue pressure relief program.  Daily skin exams and reports from nursing. Fatigue due to nutritional and hydration deficiency: Add and titrate vitamin B12 vitamin D and CoQ10 continue to monitor I&Os, calorie counts prn, dietary consult prn. Add healthy snack at night. Acute episodic insomnia with situational adjustment disorder:  prn Ambien, monitor for day time sedation. Falls risk elevated:  patient to use call light to get nursing assistance to get up, bed and chair alarm. Elevated DVT risk: progressive activities in PT, continue prophylaxis JOB hose, elevation and meds-see MAR. Complex discharge planning: Discharge March 12, 2023 home with his wife and home health care. Weekly team meeting every Monday to re-assess progress towards goals, discuss and address social, psychological and medical comorbidities and to address difficulties they may be having progressing in therapy. Patient and family education is in progress. The patient is to follow-up with their family physician after discharge.         Complex Active General Medical Issues that complicate care Assess & Plan:    Mesothelioma with dry cough-titrate O2, aerosols, follow-up with Dr. Yeison Maher, drain pleural Dex daily versus 3 times a week per protocols from pulmonology and lung surgery consult  Mixed hyperlipidemia,  Atherosclerosis of native artery of both lower extremities with intermittent claudication, Atrial fibrillation, Venous insufficiency-Acute rehab to monitor heart rate and rhythm with the option of telemetry and the effects of chronotropic medication with respect to increasing physical activity and exercise in PT, OT, ADLs with medication titration to lowest effective dosing. Continue blood signs every shift focusing on heart rate, rhythm and blood pressure checks with orthostatic checks-monitoring the effect of exercise, therapy and posture. Consult hospitalist for backup medical and adjust/add medications (aspirin, Lasix, Lopressor, Crestor, Coumadin, bridging Lovenox). Monitor heart rate and blood pressure as well as medications effects on vital signs before during and after therapy with especial focus on preventing orthostasis and falls risk. Gastritis without bleeding-Elevate head of bed after meals, monitor stools for blood, lowest effective dose of PPI, consider Tums. Pneumonia of both lungs due to infectious organism-Merrem Acute rehab for endurance traing with Pulse Ox to monitoring oxygen saturation and heart rate with O2 titration to lowest effective dose. Pulse oximeter checks to shift and at HS to dose and titrate oxygen and aerosol treatments monitor for nocturnal hypoxemia, monitor vital signs, oxygen prn. Focus on energy conservation. eft a Pleurx tube in O2   drain  GERD-Elevate head of bed after meals, monitor stools for blood, lowest effective dose of PPI, consider Tums. Anticoagulation coumadinization for [facility] pharmacist regarding INR management    Acute kidney failure-Eliminate toxic medications, monitor I's and O's focusing on urine output, recheck BMP.     Spinal stenosis of lumbar region with neurogenic claudication    Balance disorder-focus on balance and therapy     Focus on endurance, activity pacing, reassessing rehab goals and discharge planning.             Electronically signed by Shelia Raymundo DO on 2/27/23 at 8:24 AM WARREN Hamilton D.O., PM&R     Attending    [address]

## 2023-03-06 NOTE — PROGRESS NOTES
INDIVIDUALIZED OVERALL REHAB PLAN OF CARE  ADDENDUM TO REHAB PROGRESS NOTE-for audit purposes must also refer to this day's clinical note and combine the information      Date: 3/6/2023  Patient Name: Garth Rowe   Room: R252/R252-01    MRN: [id number]    : 1935  (87 y.o.)  Gender: male       Today 3/6/2023 during weekly team meeting, I reviewed the patient Garth Rowe in detail with the therapists and nurses involved in patient's care gathering complex physiatric data regarding current medical issues, progress in therapies, factors limiting progress, social issues, psychological issues, ongoing therapeutic plans and discharge planning.    Legend:  I= independent Im =Modified independent  S=Supervised SB=stand by DIAZ=set up CG=contact jodie Min= minimal Mod=Moderate Max=maximal Max of 2 =maximal assist of 2 people      CURRENT FUNCTIONAL STATUS:        NURSING ISSUES:   Pt weight up from 3/2 @256.3- to today @ 263.4. pt up x2 to urinate this shift. Pt was getting up much more last wk during HS. Call light in reach pt states shoulder @ \"4\"- \"better\".Nursing will continue to focus on bowel and bladder continence transitioning toward independence by time of discharge.  Monitoring post void residuals monitoring for severe constipation and bowel obstruction.      Barriers to progress and discharge severe pain      Bowel function- constipation  Plans to address- scheduled voids     Bladder function-  continent    Plans to address- schedule voids before bed and therapy     Skin deficits-  icthyosis   Plans to address- topicals and dressing changes     Hydration/Nutritional deficits- monitoring for dysphagia  Plans to address-Push PO, assist with feeds as needed      BP- decline in BP intake is a concern  Plans to address- check ortho BPs before therapies-and use abdominal binder and TEDs as needed    Pt and Family training goals-  teach home technology options like Risa use and home assistive devices      Focus  on achieving ADL goals with co-treating with OT when possible. Focus on cognition and co treat with SLP when possible. PHYSICAL THERAPY  Bed mobility:  Bed mobility  Rolling to Left: Minimal assistance (02/26/23 1242)  Rolling to Right: Minimal assistance (02/26/23 1242)  Supine to Sit: Moderate assistance (02/26/23 1242)  Sit to Supine: Moderate assistance (02/26/23 1242)  Bed Mobility Comments: HOB flat; cues to avoid breath holding (02/26/23 1242)  Roll Left  Assistance Level: Stand by assist (03/03/23 1120)  Skilled Clinical Factors: With use of bed rail (03/02/23 1317)  Roll Right  Assistance Level: Stand by assist (03/03/23 1120)  Skilled Clinical Factors: With use of bed rail (03/02/23 1317)  Sit to Supine  Assistance Level: Stand by assist (03/03/23 1120)  Skilled Clinical Factors: Increased time to complete (03/02/23 1317)  Supine to Sit  Assistance Level: Stand by assist (03/03/23 1120)  Skilled Clinical Factors: Increased time and effort (03/02/23 1317)  Scooting  Assistance Level: Stand by assist (03/03/23 1120)  Transfers:  Transfers  Sit to Stand:  Moderate Assistance (02/26/23 1243)  Stand to Sit: Moderate Assistance (02/26/23 1243)  Comment: poor use of L LE during sit to stand; [employer] used (02/26/23 1243)  Sit to Stand  Assistance Level: Supervision (03/06/23 0927)  Skilled Clinical Factors: cont multiple attempts to stand (03/06/23 0927)  Stand to Sit  Assistance Level: Supervision (03/06/23 0927)  Skilled Clinical Factors: completed safely without vc (03/06/23 0927)  Bed To/From Chair  Technique: Stand step (03/06/23 9873)  Assistance Level: Supervision (03/06/23 0339)  Skilled Clinical Factors: Increased time to complete requires use of UE's to complete (03/06/23 0927)  Car Transfer  Assistance Level: Minimal assistance (02/27/23 0942)  Skilled Clinical Factors: Enrico to rise from low level chair able to lift BLE in and out of car (02/27/23 2567)  Gait:   Ambulation  Surface: Level tile (03/02/23 1406)  Device: Rolling Walker (03/02/23 1406)  Other Apparatus: O2 (03/02/23 1406)  Assistance: Contact guard assistance;Stand by assistance (03/02/23 1406)  Quality of Gait: cues for posture and upward gaze, decreased step length and height (03/02/23 1406)  Gait Deviations: Slow Jefe; Increased MENA; Decreased step length;Decreased step height (02/26/23 1244)  Distance: 100' (03/02/23 1406)  Ambulation  Surface: Carpet; Level surface; Uneven surface (03/06/23 1140)  Device: Rolling walker (03/06/23 1140)  Distance: 60' (03/06/23 1140)  Activity: Within Room (03/06/23 1140)  Activity Comments: decresaed hs bilat, decreased step length, vc for upward gaze (03/06/23 1140)  Additional Factors: Verbal cues (03/06/23 1140)  Assistance Level: Supervision (03/06/23 1140)  Gait Deviations: Slow jefe;Decreased heel strike left;Decreased step length bilateral (03/06/23 1140)  Skilled Clinical Factors: 2 liters O2 (03/06/23 1140)  Stairs:  Stairs/Curb  Stairs?: No (03/02/23 1406)  Stairs  Stair Height: 6'' (03/06/23 1140)  Device: Bilateral handrails (03/06/23 1140)  Number of Stairs: 4 (03/06/23 1140)  Additional Factors: Verbal cues; Non-reciprocal going down;Reciprocal going up (03/06/23 1140)  Assistance Level: Supervision (03/06/23 1140)  Skilled Clinical Factors: Completed with 2L 93% post stair negotiation improved 2 96% with seated rest break (03/02/23 1214)  W/C mobility:           Pt and Family training goals-  teach patient/family best use of assistive device        OCCUPATIONAL THERAPY  Feeding  Assistance Level: Set-up (03/06/23 1158)  Skilled Clinical Factors: demo slight difficulty getting lid off cup and cup filled very full. Assisted pt with task to decrease risk of spilling beverage.  (03/03/23 1259)  Grooming/Oral Hygiene  Assistance Level: Modified independent (03/06/23 1158)  Skilled Clinical Factors: seated (03/06/23 1158)  Upper Extremity Bathing  Assistance Level: Minimal assistance (03/06/23 1158)  Skilled Clinical Factors: RUE arm pit and LUE assist (02/27/23 1201)  Lower Extremity Bathing  Assistance Level: Minimal assistance (03/06/23 1158)  Skilled Clinical Factors: feet only (03/06/23 1158)  Upper Extremity Dressing  Equipment Provided: Dressing stick (02/27/23 1201)  Assistance Level: Supervision (03/06/23 1158)  Skilled Clinical Factors: seated to take shirt off and standing leaning against sink for posterior support to don shirt; no LOB; pt states this is how he completes task at home to don/doff shirt, \" leaning against the sink. \" (03/06/23 1158)  Lower Extremity Dressing  Assistance Level: Moderate assistance (03/06/23 1158)  Skilled Clinical Factors: SBA to doff underwear/pants using clothing stick with training; Max A to don underwear (TD over feet, max A to waist, and pants: Max A over feet and Min A to waist) (02/27/23 1201)  Putting On/Taking Off Footwear  Equipment Provided: Dressing stick (02/27/23 1201)  Assistance Level: Maximum assistance (03/06/23 1158)  Skilled Clinical Factors: Sup with training of dressing stick to doff socks and TD (A) to don socks (02/27/23 1201)  Toileting  Assistance Level: Stand by assist (03/06/23 1158)  Skilled Clinical Factors: Mod A for waist < >knee pant/underwear management and TD (A) for posterior car to cleanse d/t having minimal loose stool (02/28/23 1253)  Toilet Transfers  Technique: Stand step (03/06/23 1158)  Equipment: Standard toilet (03/06/23 1158)  Additional Factors: With handrails (03/06/23 1158)  Assistance Level: Stand by assist (03/06/23 1158)  Skilled Clinical Factors: use of grab bars, standard toilet (02/27/23 1201)  Tub/Shower Transfers  Type: Shower (03/06/23 1158)  Transfer From: Rolling walker (03/06/23 1158)  Transfer To:  Shower chair with back (03/06/23 1158)  Assistance Level: Stand by assist (03/06/23 1158)  ADL  Feeding: Stand by assistance (02/26/23 1201)  Grooming: Minimal assistance (02/26/23 1201)  UE Bathing: Minimal assistance (23 1201)  LE Bathing: Maximum assistance (23 120)  LE Bathing Skilled Clinical Factors: periare only per pt request (23 120)  UE Dressing: Moderate assistance (23 120)  LE Dressing: Maximum assistance (23 1201)  Toileting: Maximum assistance (23 120)  Additional Comments: sponge bath ADL completed sitting EOB and patially on toilet. (23 120)  Toilet Transfers  Toilet - Technique: Ambulating (23)  Equipment Used: Grab bars (23 120)  Toilet Transfer: Moderate assistance (23)  Toilet Transfers Comments: CGA on ModA off (23)    Plans for addressing ADL deficits affecting: Focus on sequencing and energy conservation. SPEECH THERAPY/SLP     Comprehension:  (AUditory comprehension is [facility])  Verbal Expression:  (Supervised assist required for high level naming and reasoning tasks secondary to reduced thought organization)    Plans for cognitive and communication issues affecting addressing:   Pt and Family training goals-  teach home tecnology options like Risa use and home assistive devices       Diet/Swallow:             Compensatory Swallowing Strategies :  Alternate solids and liquids, Eat/Feed slowly, Upright as possible for all oral intake, Small bites/sips             COGNITION  OT: Cognition Comment: follows commands consistently @FLOWTIME(304  SP:        Social History     Socioeconomic History    Marital status:      Spouse name: [facility]    Number of children: Not on file    Years of education: Not on file    Highest education level: Not on file   Occupational History    Not on file   Tobacco Use    Smoking status: Former     Packs/day: 0.50     Years: 10.00     Pack years: 5.00     Types: Cigarettes     Quit date: 1968     Years since quittin.9    Smokeless tobacco: Never   Vaping Use    Vaping Use: Never used   Substance and Sexual Activity    Alcohol use: Yes     Comment: social Drug use: Never    Sexual activity: Not on file   Other Topics Concern    Not on file   Social History Narrative    Lives With: Spouse Ashleigh    Type of Home: [address] in [address]: One level (basement)    Home Access: Stairs to enter without rails (hoolds on to door jamb)    Entrance Stairs - Number of Steps: 2    Bathroom Shower/Tub: Walk-in shower    Home Equipment: Walker, rolling, Rollator, Cane (uses rollator outside)    Has the patient had two or more falls in the past year or any fall with injury in the past year?: Yes (broken rib and puncture lung)    ADL Assistance: Independent    Homemaking Assistance: Needs assistance (staniding tolerance is limited)    Ambulation Assistance: Independent (ww)    Transfer Assistance: Independent    Active : No    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[employer]    Additional Comments: right handed         Social Determinants of Health     Financial Resource Strain: Not on file   Food Insecurity: Not on file   Transportation Needs: Not on file   Physical Activity: Not on file   Stress: Not on file   Social Connections: Not on file   Intimate Partner Violence: Not on file   Housing Stability: Not on file           THERAPY, [address]:    [x]  Pain medication before therapies     [x]  Check orthostatic BP and monitor heart rate and medications effects with therapy      [x]  Ambulate to the bathroom in room    [x]  Add scheduled rest beaks     [x]  In room therapies as needed      Discharge date set for:              3/12/23      Home with:   wife  with help from   wife            And:      Home Health Care:     [x]  PT    [x]  OT    []  ST   [x]  Aide       [x]  RN                    Equipment:  [employer],        At D/C their function is goaled at:   PT:Long Term Goal 1: indep bed mobility  Long Term Goal 2: Pt will demonstrate transfers indep with safest AD  Long Term Goal 3: Pt will demonstrate amb >/= 150ft supervision with safest AD - indep for 30 feet  Long Term Goal 4: Pt will demonstrate stair negotiation 3 steps without rails with safest AD SBA  Long Term Goal 5: Pt will demonstrate pantoja balance assessment >/= 45/56 for decreased risk for falls. OT:Eating  Assistance Needed: Supervision or touching assistance  CARE Score: 4  Discharge Goal: Independent, Oral Hygiene  Assistance Needed: Supervision or touching assistance  CARE Score: 4  Discharge Goal: Independent, Toileting Hygiene  Assistance needed: Substantial/maximal assistance  CARE Score: 2  Discharge Goal: Supervision or touching assistance, Shower/Bathe Self  Assistance Needed: Substantial/maximal assistance  CARE Score: 2  Discharge Goal: Supervision or touching assistance  Upper Body Dressing  Assistance Needed: Partial/moderate assistance  CARE Score: 3  Discharge Goal: Supervision or touching assistance, Lower Body Dressing  Assistance Needed: Substantial/maximal assistance  CARE Score: 2  Discharge Goal: Supervision or touching assistance, Putting On/Taking Off Footwear  Assistance Needed: Dependent  CARE Score: 1  Discharge Goal: Supervision or touching assistance, Toilet Transfer  Assistance needed: Partial/moderate assistance  CARE Score: 3  Discharge Goal: Supervision or touching assistance  SP:Long Term Goals  Time Frame for Long Term Goals: 2 weeks or LOS until goals are met. Goal 1: Pt will demonstrate functional cognitive-linguistic abilities in all opportunities with modified independence in order to safely complete ADLs.   Long-term Goals  Timeframe for Long-term Goals: n/a           From a cognitive standpoint they will need:        24 hr vs daily transitioning to supervision  -->progress to occasional             Significant problems/ barriers to functional progress include: Pt is at a high risk for functional loss,      []  Acute infection/UTI    []  Low BP's     []  COPD flare-up   []  Uncontrolled blood sugar     []  Progressive anemia     [x]  poor endurance    []  Severe pain     []  Impaired mental status    []  Urinary incontinence    []  Bowel incontinence           Plan to correct barriers to functional progress: Add scheduled rest breaks, CoQ 10 and Vit B 12, control pain by using ice Lidoderm rest and massage as well as pain medications prior to therapy. Spread therapy of 15 hours out over a 7 day window to accommodate rest breaks and medical interventions. Patient seems to be making fair to good response to these interventions. Based on a comprehensive evaluation of the above, the individualized therapy and Discharge plan will be:    -Times stated are an average that will be varied based on the patient's daily need. PT   1 1/2  hrs/day 5-7 days per wk      OT   1 1/2 hrs per day 5-7 days per wk     ST  1/2    hrs /day 3-5 days per week       Estimated LOS   1-2 week(s)    -Overall functional prognosis:     [x]  Good    []  Fair    []  Poor     -Medical Prognosis:   [x]  Good    []  Fair    []  Poor    This patient was made aware of the discussion of Plan of Care, their projected dicharge date and their projected function at discharge.          Madelaine Monte, DO

## 2023-03-06 NOTE — CARE COORDINATION
LSW met with pt, wife, and Damian Sexton (RN) and discussed upcoming discharge and training. LSW scheduled family training with wife for Wednesday 3/8 at 9:30AM with OT for an ADL then PT afterwards. LSW also discussed pleurx drain training and that two people are needed due to difficulty with dressings/supplies. Wife stated that her dtr Brayden Flores should be able to attend and will discuss what time/days. Damian Sexton recommended any day around 5:30/6PM, wife in agreement. Wife to follow up with LSW.  Electronically signed by ANALY Mccoy, CHERW on 3/6/2023 at 4:53 PM

## 2023-03-06 NOTE — PLAN OF CARE
Problem: Discharge Planning  Goal: Discharge to home or other facility with appropriate resources  3/5/2023 2242 by Nupur Guadarrama RN  Outcome: Progressing  Flowsheets (Taken 3/5/2023 2155)  Discharge to home or other facility with appropriate resources: Identify barriers to discharge with patient and caregiver  3/5/2023 1209 by Bryanna Suazo RN  Outcome: Progressing  Flowsheets (Taken 3/5/2023 1150)  Discharge to home or other facility with appropriate resources: Identify barriers to discharge with patient and caregiver

## 2023-03-06 NOTE — PROGRESS NOTES
OCCUPATIONAL THERAPY  INPATIENT REHAB TREATMENT NOTE  [address]      NAME: Margo Donis  : 1935 (82 y.o.)  MRN: [id number]  CODE STATUS: Full Code  Room: B546/R904-63    Date of Service: 3/6/2023    Referring Physician: Dr. Suzanne Heimlich Diagnosis: Impaired mobility and ADL's due to severe pulmonary debility    Restrictions  Restrictions/Precautions  Restrictions/Precautions: Fall Risk         Position Activity Restriction  Other position/activity restrictions: pleurx on L side    Patient's date of birth confirmed: Yes    SAFETY:  Safety Devices  Safety Devices in place: Yes  Type of devices: All fall risk precautions in place    SUBJECTIVE:  Subjective: \" Oh yes the left one. \"    Pain at start of treatment: Yes: 5/10    Pain at end of treatment: Yes: 5/10    Location: L shoulder  Nursing notified: No  Intervention: Other: pt already had pain meds    COGNITION:  Orientation  Overall Orientation Status: Within Normal Limits  Orientation Level: Oriented X4  Cognition  Overall Cognitive Status: WFL          OBJECTIVE:     Provided pt with Table Top Towel Exercises with Sup  Provided pt with instruction and demonstration for each exercise. Pt was provided with demo to task. .  Task demonstration return by pt was done for each exercise. Pt could only complete circles forward/backward with LUE, and then shoulder became too painful to continue other exercises. Discussed completing rest of exercises of figure 8's and the alphabet with both BUE using RUE to lead the L, using AAROM. Pt demonstrated task with some  difficulty with use of LUE solely. Provided task to improve LUE reaching, ROM and function, and increase BUE movement/strength to improve with safety and functional Ind with transfers, mobility and ADL tasks.         ASSESSMENT:  Assessment: Pt cooperative and in good mood, needs 02 during activity- pt at 92-94 without 02 sitting at rest, placed 02 on for activity, checked post activity at 94% but rapidly progressed to 99%-100%. Activity Tolerance: Patient tolerated treatment well      PLAN OF CARE:  Balance training, Functional mobility training, Strengthening, ROM, Endurance training, Safety education & training, Patient/Caregiver education & training, Equipment evaluation, education, & procurement, Home management training, Self-Care / ADL, Coordination training  Continue OT POC until discharge    Patient goals : to get stronger and possibly return to driving  Time Frame for Long Term Goals : within 1.5-2wks pt will demosntrate progress in the following areas to achieve specific LTG's listed in the initial eval  Long Term Goal 1: improve overall strength/endurance for functional tasks. Long Term Goal 2: improve independence with self care  Long Term Goal 3: improve sitting/standing balance for ADL tasks  Long Term Goal 4: improve functional mobility with LRD for safe item transport/retrieval  Long Term Goal 5: improve FMC to independently manipulate fasteners        Therapy Time:   Individual Group Co-Treat   Time In 1400       Time Out 1430         Minutes 30             ]Therapeutic activities: 30 minutes     Electronically signed by:     HERMAN Noble,   3/6/2023, 3:13 PM

## 2023-03-06 NOTE — PROGRESS NOTES
Warfarin Dosing - Pharmacy Consult Note  Consulting Provider: EDELMIRA Vee - CNP    Indication:  Atrial Fibrillation  Warfarin Dose prior to admission: 4 mg T/TH/Sat/Sun, 5 mg M/W/F   Concurrent anticoagulants/antiplatelets: aspirin 81 mg  Significant Drug Interactions: No obvious interactions  Recent Labs     03/04/23  0926 03/05/23  0417 03/06/23  0416   INR 3.0 3.0 3.1     Recent warfarin administrations                     warfarin (COUMADIN) tablet 2 mg (mg) 2 mg Given 03/04/23 1724                   Date   INR    Dose  2/24       1.6        5 mg   2/25       1.9        4 mg   2/26       2.5        2 mg  2/27       2.9        2.5 mg   2/28       2.8         4 mg  3/1         3.2        HOLD  3/2         3.4        HOLD  3/3         3.4        HOLD  3/4         3.0         2 mg  3/5         3.0         2 mg  3/6         3.1        HOLD    Assessment/Plan  (Goal INR: 2 - 3)  INR slightly supra therapeutic today at 3.1  Will hold warfarin today     Active problem list reviewed. INR orders are placed. Chart reviewed for pertinent labs, drug/diet interactions, and past doses. Documentation of patient's clinical condition was reviewed. Pharmacy Dosing:  Pharmacy will continue to follow.      Marion Wu PharmD  3/6/2023 1:33 PM

## 2023-03-06 NOTE — PROGRESS NOTES
Pt weight up from 3/2 @256. 3- to today @ 263. 4. pt up x2 to urinate this shift. Pt was getting up much more last wk during HS. Call light in reach pt states shoulder @ \"4\"- \"better\".

## 2023-03-06 NOTE — PROGRESS NOTES
Facility/Department[facility]  Rehabilitation Goal update: Physical Therapy  Room: [facility]        Pts goals updated as listed below:  Long Term Goal 1: indep bed mobility  Long Term Goal 2: Pt will demonstrate transfers indep with safest AD  Long Term Goal 3: Pt will demonstrate amb >/= 150ft supervision with safest AD - indep for 30 feet  Long Term Goal 4: Pt will demonstrate stair negotiation 3 steps without rails with safest AD SBA  Long Term Goal 5: Pt will demonstrate pantoja balance assessment >/= 45/56 for decreased risk for falls.

## 2023-03-06 NOTE — PROGRESS NOTES
Physical Therapy Rehab Treatment Note  Facility/Department: [facility]  Room: [place] [id number]       NAME: Luis Stubbs  : 1935 (82 y.o.)  MRN: [id number]  CODE STATUS: Full Code    Date of Service: 3/6/2023     Restrictions:  Restrictions/Precautions: Fall Risk  Position Activity Restriction  Other position/activity restrictions: pleurx on L side    SUBJECTIVE:   Subjective: \"My shoulder is up there today\"    Pain  Pain: 5/10 pre session  6/10 L shoulder pain post session \"Achy\" Medicated prior to treatment    OBJECTIVE:     Sit to Stand  Assistance Level: Supervision  Skilled Clinical Factors: cont multiple attempts to stand  Stand to Sit  Assistance Level: Supervision  Skilled Clinical Factors: completed safely without vc  Bed To/From Chair  Technique: Stand step  Assistance Level: Supervision  Skilled Clinical Factors: Increased time to complete requires use of UE's to complete    Ambulation  Surface: Carpet; Level surface; Uneven surface  Device: Rolling walker  Distance: 60'  Activity: Within Room  Activity Comments: decresaed hs bilat, decreased step length, vc for upward gaze  Additional Factors: Verbal cues  Assistance Level: Supervision  Gait Deviations: Slow jefe;Decreased heel strike left;Decreased step length bilateral  Skilled Clinical Factors: 2 liters O2    Stairs  Stair Height: 6''  Device: Bilateral handrails  Number of Stairs: 4  Additional Factors: Verbal cues; Non-reciprocal going down;Reciprocal going up  Assistance Level: Supervision    Seated there ex: LAQ's, Hip flexion, Hip abduction, Ball squeezes x 20 each    ASSESSMENT/PROGRESS TOWARDS GOALS:   Assessment  Assessment: Patient continues to exhibit SOB with exertion maintaining 93%+ with 2L O2 donned.  Increased L shoulder pain with gait and stairs requests seated exercises to complete therapy session  Activity Tolerance: Patient tolerated treatment well  Discharge Recommendations: Continue to assess pending progress    Goals:  Long Term Goals  Long Term Goal 1: indep bed mobility  Long Term Goal 2: Pt will demonstrate transfers supervision with safest AD  Long Term Goal 3: Pt will demonstrate amb >/= 150ft supervision with safest AD  Long Term Goal 4: Pt will demonstrate stair negotiation 3 steps without rails with safest AD SBA  Long Term Goal 5: Pt will demonstrate pantoja balance assessment >/= 45/56 for decreased risk for falls. Patient Goals   Patient Goals : Rosemary Millin out of chairs easier. drive a car.  stand for longer time. \"    PLAN OF CARE/Safety:   Safety Devices  Type of Devices: All fall risk precautions in place; Chair alarm in place      Therapy Time:   Individual   Time In 0900   Time Out 0930   Minutes 30     Minutes: 30  Transfer/Bed mobility training: 10  Gait training:15  Therapeutic ex: [address] [place], PTA, 03/06/23 at 11:44 AM

## 2023-03-06 NOTE — PROGRESS NOTES
OCCUPATIONAL THERAPY  INPATIENT REHAB TREATMENT NOTE  [address]      NAME: Shahana Hameed  : 1935 (82 y.o.)  MRN: [id number]  CODE STATUS: Full Code  Room: R346/Q219-84    Date of Service: 3/6/2023    Referring Physician: Dr. Hebert Kate Diagnosis: Impaired mobility and ADL's due to severe pulmonary debility    Restrictions  Restrictions/Precautions  Restrictions/Precautions: Fall Risk         Position Activity Restriction  Other position/activity restrictions: pleurx on L side    Patient's date of birth confirmed: Yes    SAFETY:  Safety Devices  Safety Devices in place: Yes  Type of devices: All fall risk precautions in place    SUBJECTIVE:  Subjective: \" Oh yes the left one. \"    Pain at start of treatment: Yes: 10    Pain at end of treatment: Yes: 5/10    Location: L anterior shoulder  Nursing notified: Yes pt notified nursing  Intervention: RN provided pain medication    COGNITION:  Orientation  Overall Orientation Status: Within Normal Limits  Orientation Level: Oriented X4  Cognition  Overall Cognitive Status: WFL      Pt's current cognitive status is:  Comprehension: Independent  Expression: Independent  Social Interaction: Independent  Problem Solving: Mod I  Memory: Independent    OBJECTIVE:         Feeding  Assistance Level: Set-up  Grooming/Oral Hygiene  Assistance Level: Modified independent  Skilled Clinical Factors: seated  Upper Extremity Bathing  Assistance Level: Minimal assistance  Lower Extremity Bathing  Assistance Level: Minimal assistance  Skilled Clinical Factors: feet only  Upper Extremity Dressing  Assistance Level: Supervision  Skilled Clinical Factors: seated to take shirt off and standing leaning against sink for posterior support to don shirt; no LOB; pt states this is how he completes task at home to don/doff shirt, \" leaning against the sink. \"  Lower Extremity Dressing  Assistance Level:  Moderate assistance  Putting On/Taking Off Footwear  Assistance Level: Maximum assistance  Toileting  Assistance Level: Stand by assist  Toilet Transfers  Technique: Stand step  Equipment: Standard toilet  Additional Factors: With handrails  Assistance Level: Stand by assist  Tub/Shower Transfers  Type: Shower  Transfer From: Rolling walker  Transfer To: Shower chair with back  Assistance Level: Stand by assist         ASSESSMENT:  Assessment: Fatigued, states he placed his CPAP mask away last night thinking it was later in the morning than it was. Activity Tolerance: Patient tolerated treatment well      PLAN OF CARE:  Balance training, Functional mobility training, Strengthening, ROM, Endurance training, Safety education & training, Patient/Caregiver education & training, Equipment evaluation, education, & procurement, Home management training, Self-Care / ADL, Coordination training  Continue OT POC until discharge    Patient goals : to get stronger and possibly return to driving  Time Frame for Long Term Goals : within 1.5-2wks pt will demosntrate progress in the following areas to achieve specific LTG's listed in the initial eval  Long Term Goal 1: improve overall strength/endurance for functional tasks. Long Term Goal 2: improve independence with self care  Long Term Goal 3: improve sitting/standing balance for ADL tasks  Long Term Goal 4: improve functional mobility with LRD for safe item transport/retrieval  Long Term Goal 5: improve FMC to independently manipulate fasteners        Therapy Time:   Individual Group Co-Treat   Time In 1105       Time Out 1205         Minutes 60             ADL/IADL trainin minutes     Electronically signed by:     HERMAN Ha,   3/6/2023, 12:00 PM

## 2023-03-06 NOTE — PROGRESS NOTES
PleurX drained using sterile technique per policy, wife present to observe. Pt maty well, 100 cc of fluid collected in container. Written instructions given to wife to take home to review. Pt repositioned in bed, call light in reach.  Electronically signed by Lisy Mcdaniel RN on 3/6/2023 at 6:27 PM

## 2023-03-06 NOTE — PROGRESS NOTES
Wife at bedside. Pt medicated for c/o L shoulder pain see MAR. Dr. Elsa Almaraz in to see pt, injection given to L shoulder, pt maty well. Pt had a BM earlier today, pt on RA at this time, no SOB noted.

## 2023-03-07 LAB
INR BLD: 2.8
PROTHROMBIN TIME: 29.8 SEC (ref 12.3–14.9)

## 2023-03-07 PROCEDURE — 97129 THER IVNTJ 1ST 15 MIN: CPT

## 2023-03-07 PROCEDURE — 2700000000 HC OXYGEN THERAPY PER DAY

## 2023-03-07 PROCEDURE — 97112 NEUROMUSCULAR REEDUCATION: CPT

## 2023-03-07 PROCEDURE — 6370000000 HC RX 637 (ALT 250 FOR IP): Performed by: PHYSICAL MEDICINE & REHABILITATION

## 2023-03-07 PROCEDURE — 97116 GAIT TRAINING THERAPY: CPT

## 2023-03-07 PROCEDURE — 97530 THERAPEUTIC ACTIVITIES: CPT

## 2023-03-07 PROCEDURE — 1180000000 HC REHAB R&B

## 2023-03-07 PROCEDURE — 6370000000 HC RX 637 (ALT 250 FOR IP): Performed by: FAMILY MEDICINE

## 2023-03-07 PROCEDURE — 6370000000 HC RX 637 (ALT 250 FOR IP): Performed by: NURSE PRACTITIONER

## 2023-03-07 PROCEDURE — 97535 SELF CARE MNGMENT TRAINING: CPT

## 2023-03-07 PROCEDURE — 36415 COLL VENOUS BLD VENIPUNCTURE: CPT

## 2023-03-07 PROCEDURE — 85610 PROTHROMBIN TIME: CPT

## 2023-03-07 PROCEDURE — 6370000000 HC RX 637 (ALT 250 FOR IP): Performed by: REGISTERED NURSE

## 2023-03-07 PROCEDURE — 97130 THER IVNTJ EA ADDL 15 MIN: CPT

## 2023-03-07 PROCEDURE — 99232 SBSQ HOSP IP/OBS MODERATE 35: CPT | Performed by: PHYSICAL MEDICINE & REHABILITATION

## 2023-03-07 RX ORDER — WARFARIN SODIUM 1 MG/1
1 TABLET ORAL
Status: COMPLETED | OUTPATIENT
Start: 2023-03-07 | End: 2023-03-07

## 2023-03-07 RX ADMIN — ROSUVASTATIN CALCIUM 10 MG: 5 TABLET, FILM COATED ORAL at 09:08

## 2023-03-07 RX ADMIN — WARFARIN SODIUM 1 MG: 1 TABLET ORAL at 17:48

## 2023-03-07 RX ADMIN — ACETAMINOPHEN 650 MG: 325 TABLET ORAL at 14:07

## 2023-03-07 RX ADMIN — POTASSIUM CHLORIDE 10 MEQ: 750 CAPSULE, EXTENDED RELEASE ORAL at 09:09

## 2023-03-07 RX ADMIN — Medication 100 MG: at 09:09

## 2023-03-07 RX ADMIN — GUAIFENESIN 600 MG: 600 TABLET ORAL at 09:09

## 2023-03-07 RX ADMIN — NALOXEGOL OXALATE 25 MG: 12.5 TABLET, FILM COATED ORAL at 09:08

## 2023-03-07 RX ADMIN — Medication 5 MG: at 20:45

## 2023-03-07 RX ADMIN — FUROSEMIDE 20 MG: 20 TABLET ORAL at 09:08

## 2023-03-07 RX ADMIN — Medication 2000 UNITS: at 16:52

## 2023-03-07 RX ADMIN — ACETAMINOPHEN 650 MG: 325 TABLET ORAL at 20:45

## 2023-03-07 RX ADMIN — ASPIRIN 81 MG: 81 TABLET, COATED ORAL at 09:08

## 2023-03-07 RX ADMIN — METOPROLOL TARTRATE 25 MG: 25 TABLET, FILM COATED ORAL at 20:45

## 2023-03-07 RX ADMIN — GUAIFENESIN 600 MG: 600 TABLET ORAL at 20:45

## 2023-03-07 RX ADMIN — METOPROLOL TARTRATE 25 MG: 25 TABLET, FILM COATED ORAL at 09:08

## 2023-03-07 RX ADMIN — ACETAMINOPHEN 650 MG: 325 TABLET ORAL at 09:09

## 2023-03-07 ASSESSMENT — PAIN DESCRIPTION - ORIENTATION
ORIENTATION: LEFT
ORIENTATION: LEFT

## 2023-03-07 ASSESSMENT — PAIN DESCRIPTION - LOCATION
LOCATION: SHOULDER
LOCATION: SHOULDER

## 2023-03-07 ASSESSMENT — PAIN SCALES - GENERAL
PAINLEVEL_OUTOF10: 2
PAINLEVEL_OUTOF10: 4
PAINLEVEL_OUTOF10: 4

## 2023-03-07 ASSESSMENT — PAIN DESCRIPTION - DESCRIPTORS
DESCRIPTORS: ACHING
DESCRIPTORS: ACHING

## 2023-03-07 ASSESSMENT — PAIN SCALES - WONG BAKER: WONGBAKER_NUMERICALRESPONSE: 0

## 2023-03-07 NOTE — PROGRESS NOTES
[facility]   Acute  Rehabilitation  MUSIC THERAPY      Date:  3/7/2023        Patient Name: Josie Escobar       MRN: [id number]        YOB: 1935 (82 y.o.)       Gender: male  Diagnosis: Impaired mobility and ADL's due to severe pulmonary debility  Referring Practitioner: Dr. Castillo All    RESTRICTIONS/PRECAUTIONS:  Restrictions/Precautions: Fall Risk     Hearing: Exceptions to [facility]  Hearing Exceptions: Hard of hearing/hearing concerns      TIME OF SESSION: 12:10pm - 12:30pm     SUBJECTIVE:  \"Now would be fine if that's okay! \"     OBJECTIVE:        [x] To Improve Mood     [x] To Increase Social Well-Being  [] To Increase Focus   [x] To Increase Emotional Well-Being [] To Increase Eye Contact    [] To Increase Spiritual Well-Being   [] To Decrease Anxiety   [x] To Increase Relaxation   [] To Decrease Pain    [] To Increase Communication  [] To Increase Movement to Music     MUSIC INTERVENTION PROVIDED:     [x] Live Music on Voice  [] Recorded Music   [x] Live Music on Guitar  [x] Discussion Related to Music   [] Live Music on Q-chord  [x] Discussion Related to Pt Experience   [] Live Music on Percussion      PARTICIPATION LEVEL OF PATIENT:     [x] Active with discussion   [] Passive with discussion   [x] Active with singing    [] Passive with singing   [] Active with instrument playing  [] Passive with instrument playing   [x] Actively listening to music   [] Passively listening to music.      OUTCOMES OBSERVED:      [x] Improved Mood   [x] Increased Social Well-Being  [] Increased Focus   [x] Increased Emotional Well-Being  [] Increased Eye Contact    [] Increased Spiritual Well-Being   [] Decreased Anxiety   [x] Increased Relaxation   [] Decreased Pain    [] Increased Communication   [] Increased Movement to Music     PAIN ASSESSMENT    Before MT:      [x] No     [] Yes   Location:    Rating:  /10    Comment(s):    After MT:         [x] No     [] Yes   Location:     Rating: /10    Comment(s):     ANXIETY ASSESSMENT    Before MT:      [x] No     [] Yes   Rating:  /10    Comment(s):    After MT:         [x] No     [] Yes   Rating:   /10    Comment(s):       ASSESSMENT/OBSERVATIONS:     Patient's music interests and/or background: country    Patient tolerated todays treatment session:      [x] Good          [] Fair          [] Poor         Comment(s): Patient found sitting up in his wheelchair eating his lunch with his wife present when [facility] arrived to offer Music Therapy Services. Patient accepted music therapy visit. MT-BC provided live, patient preferred music on guitar and voice. Patient actively engaged in the music therapy by discussing topics related to their hospital stay, discussing topics related to the music,  making song suggestions, singing along at times, and by actively listening to the music. Patient responded positively to the music therapy as evidence by improved mood, increased socialization, increased relaxation and by making positive comments about the music therapy. PLAN:      [x] Pt interested in having music therapy again. [] [facility] will attempt to see pt again another day, if time allows. [x] Pt's planned d/c date is before MT-BC is scheduled on unit again. [] Pt NOT interested in having music therapy again.             You Borges MT-BC    3/7/2023  Electronically signed by You Borges on 3/7/2023 at 2:07 PM

## 2023-03-07 NOTE — PROGRESS NOTES
OCCUPATIONAL THERAPY  INPATIENT REHAB TREATMENT NOTE  [address]      NAME: Ritesh Pérez  : 1935 (82 y.o.)  MRN: [id number]  CODE STATUS: Full Code  Room: I120/U239-94    Date of Service: 3/7/2023    Referring Physician: Dr. Isaias Elliott Diagnosis: Impaired mobility and ADL's due to severe pulmonary debility    Restrictions  Restrictions/Precautions  Restrictions/Precautions: Fall Risk         Position Activity Restriction  Other position/activity restrictions: pleurx on L side    Patient's date of birth confirmed: Yes    SAFETY:  Safety Devices  Safety Devices in place: Yes  Type of devices: All fall risk precautions in place    SUBJECTIVE:  Subjective: \" What the thing called in your neck? \"    Pain at start of treatment: Yes: 4/10    Pain at end of treatment: Yes: 4/10    Location: anterior L shoulder  Nursing notified: No  Intervention: RN provided pain medication    COGNITION:  Orientation  Overall Orientation Status: Within Normal Limits  Orientation Level: Oriented X4  Cognition  Overall Cognitive Status: WFL  Cognition Comment: follows commands consistently    OBJECTIVE:    Discussed pt returning home at current levels. Upon asking pt if he felt he would do okay completing a shower, pt sounded very assured of himself doing a shower at home. Will provide pt with long handled shower sponge and continue observing pt standing tolerance and balance during tasks. Will also have pt start completing showers in standing as long as possible to resemble how he would complete them at home. Pt demonstrates standing tolerance not in alignment for what he would need to complete a shower at this time. Pt completed puzzle with finger piece projections sticking out of them for gripping with fingers. Pt completed task with Sup, using BUE 1st/2nd digits. Pt completed task using BUE for each corresponding side of puzzle to place puzzle pieces.   Pt demo'd min difficulty but did not appear in as much pain as in morning therapy session. Pt used LUE prn during task to keep mobile and moving. Pt completed task to increase fine motor skills, increase functional movement in LUE to increase ease and Ind with functional ADL tasks. Pt completed large pipe tree activity seated 2x's with Sup/ Min A. Pt completed tasks with instruction to task with diagram placed midline. Pt required no Assist for 1st design but min A to complete 2nd design d/t pt not being able to find error in design. After pointing out error and how to resolve the issue of not having a piece of pipe of needed size, pt was able to finish task and complete without continued needed assist.   Pt had x 2 errors on 2nd design shorting 2 pieces of pipe from structure. Provided task to improve standing tolerance and act tolerance to increase independence with functional ADL tasks. Sit to Stand  Assistance Level: Stand by assist  Stand to Sit  Assistance Level: Stand by assist     Provided pt with clothes pin/ruler activity standing with Sup. Provided pt instruction to place clothes pins on ruler switching back and forth between BUE. Pt completed lower part of ruler with L hand and upper half with RUE d/t physical ROM limitations in LUE shoulder. Pt stood x 5:30 min to don all clothes pins and then required a break, sitting down with Sup, good hand placement. Pt completed task to improve with functional standing tolerance, act tolerance, balance/coordination to increase safety and Ind with mobility and ADL tasks.          ASSESSMENT:  Assessment: Pt doing well, movement on L side if done in small patterns looks good and not real painful for pt shoulders, LUE used intermittently d/t continued pain  Activity Tolerance: Patient tolerated treatment well      PLAN OF CARE:  Balance training, Functional mobility training, Strengthening, ROM, Endurance training, Safety education & training, Patient/Caregiver education & training, Equipment evaluation, education, & procurement, Home management training, Self-Care / ADL, Coordination training  Continue OT POC until discharge    Patient goals : to get stronger and possibly return to driving  Time Frame for Long Term Goals : within 1.5-2wks pt will demosntrate progress in the following areas to achieve specific LTG's listed in the initial eval  Long Term Goal 1: improve overall strength/endurance for functional tasks. Long Term Goal 2: improve independence with self care  Long Term Goal 3: improve sitting/standing balance for ADL tasks  Long Term Goal 4: improve functional mobility with LRD for safe item transport/retrieval  Long Term Goal 5: improve FMC to independently manipulate fasteners        Therapy Time:   Individual Group Co-Treat   Time In 1430       Time Out 1530         Minutes 60               ADL/IADL trainin minutes  Neuromuscular reeducation: 22 minutes  Therapeutic activities: 30 minutes     Electronically signed by:     HERMAN Doyle,   3/7/2023, 2:56 PM

## 2023-03-07 NOTE — PROGRESS NOTES
[facility] Occupational Therapy      Date: 3/7/2023  Patient Name: Luis Stubbs        MRN: [id number]  Account: [de-identified]   : 1935  (82 y.o.)  Room: [place] [address]    Chart reviewed, attempted OT at 0 for get up / go.  Patient not seen 2° to:    Pt. declined, stating: all needs met, pt declined and comfortable in bed eating breakfast    Will attempt again when able    Electronically signed by Alessia Darden OT on 3/7/2023 at 8:06 AM

## 2023-03-07 NOTE — PROGRESS NOTES
Physical Therapy Rehab Treatment Note  Facility/Department: [facility]  Room: \[facility]\""U194-69       NAME: Nancy Lam  : 1935 (82 y.o.)  MRN: [id number]  CODE STATUS: Full Code    Date of Service: 3/7/2023      Restrictions:  Restrictions/Precautions: Fall Risk  Position Activity Restriction  Other position/activity restrictions: pleurx on L side    SUBJECTIVE:   Subjective: \" I am doing alright\"    Pain  Pain: 2/10 L shoulder pain pre and post session Sore declines intervention    OBJECTIVE:     Roll Left  Assistance Level: Supervision  Roll Right  Assistance Level: Supervision  Sit to Supine  Assistance Level: Supervision  Supine to Sit  Assistance Level: Supervision  Scooting  Assistance Level: Supervision    Sit to Stand  Assistance Level: Supervision  Stand to Sit  Assistance Level: Supervision  Skilled Clinical Factors: completed safely without vc  Bed To/From Chair  Technique: Stand step  Assistance Level: Supervision  Skilled Clinical Factors: Completes with [employer]  Car Transfer  Assistance Level: Stand by assist    Ambulation  Surface: Carpet; Level surface; Uneven surface  Device: Rolling walker  Distance: 200', 110'  Activity: Within Unit  Activity Comments: Improved heel strike/ pace with shoes donned  Additional Factors: Verbal cues  Assistance Level: Stand by assist  Gait Deviations: Slow jefe;Decreased heel strike left;Decreased step length bilateral  Skilled Clinical Factors: Increased fatigue with extended distances. Requiring extended seated rest break to decrease SOB SpO2 94% post gait    Stairs  Stair Height: 6''  Device: Bilateral handrails  Number of Stairs: 4  Additional Factors: Verbal cues; Non-reciprocal going down;Reciprocal going up  Assistance Level: Supervision  Skilled Clinical Factors: 92% post stairs requires seated rest break due to SOB    Neuromuscular Education  NDT Treatment: Standing  Neuromuscular Comments: TEIXEIRA balance tasks in unsupported standing to imrpove standing balance and improve ADL tasks    Activity Tolerance  Activity Tolerance: Patient tolerated treatment well    ASSESSMENT/PROGRESS TOWARDS GOALS:   Assessment  Assessment: Patient progressing towards all goals at this time improving gait tolerance to 150' and improved stability with unsupported TEIXEIRA tasks. Good safety with stair negotiation  Activity Tolerance: Patient tolerated treatment well  Discharge Recommendations: Continue to assess pending progress    Goals:  Long Term Goals  Long Term Goal 1: indep bed mobility  Long Term Goal 2: Pt will demonstrate transfers indep with safest AD  Long Term Goal 3: Pt will demonstrate amb >/= 150ft supervision with safest AD - indep for 30 feet  Long Term Goal 4: Pt will demonstrate stair negotiation 3 steps without rails with safest AD SBA  Long Term Goal 5: Pt will demonstrate teixeira balance assessment >/= 45/56 for decreased risk for falls. Patient Goals   Patient Goals : Fredy Expose out of chairs easier. drive a car.  stand for longer time. \"    PLAN OF CARE/Safety:   Safety Devices  Type of Devices: All fall risk precautions in place; Chair alarm in place      Therapy Time:   Individual   Time In 1300   Time Out 1400   Minutes 60     Minutes: 60  Transfer/Bed mobility trainin  Gait trainin  Neuro re education: [address], [place], 23 at 4:37 PM

## 2023-03-07 NOTE — PROGRESS NOTES
Perfect serve to Dr. Ruthanne Meckel regarding the pleura cath upon  discharge, which is  set for 3/12. Pt. Wife talked about training that was set for Thursday. We are going to set it for Wednesday as this is the date the pleura cath is set to be drained. Daughter and son may also come in for family training. Pt had an injection with Dr. Gisselle Gorman on 3/6/23 and expressed some relief. Call to pharmacy regarding the PT/INR and coumadin dosing. New orders written. Lab updated for the need to draw PT/INR.

## 2023-03-07 NOTE — PROGRESS NOTES
[facility]  Facility/Department: [facility]  Speech Language Pathology   Treatment Note          Jay Shetty  1935  W344/C041-47  [x]   confirmed    Date: 3/7/2023      Restrictions/Precautions: Fall Risk  Position Activity Restriction  Other position/activity restrictions: pleurx on L side    Weight: 263 lb 6.4 oz (119.5 kg)     ADULT DIET; Regular    SpO2: 94 % (23 0754)  O2 Flow Rate (L/min): 2 L/min (23 4046)  No active isolations    Rehab Diagnosis:      Subjective:  Alert, Cooperative, and Pleasant        Interventions used this date:  Cognitive Skill Development    Objective/Assessment:  Patient progressing towards goals:  Short Term Goals  Time Frame for Short Term Goals: 2 weeks or LOS until goals are met. Goal 1: To increase safety awareness and judgment for safe completion of ADLs secondary to pt's cognitive deficits, pt will complete abstract reasoning tasks (i.e. Word deduction, convergent and divergent naming, similarities/differences) with 80% accuracy and min cues. Patient completed a concrete naming task with the following accuracy:  5 items: 93% accuracy I, with min cueing 100%  7 items: 82% accuracy I, with min cueing 100%    Goal 2: To increase safety awareness and judgment for safe completion of ADLs secondary to pt's cognitive deficits,  pt will complete min-mod level problem solving tasks related to ADLs (e.g. medication) with 80% accuracy and min cues. Patient completed mid-level math word problems with 60% accuracy I, with supervised assistance 80%, and with min cueing 100%. Patient completed a monthly calendar task and corresponding questions with 16% accuracy I, with supervised assistance 100%. Patient benefit from redirection from the ST and rereading the question. Goal 4:  To decrease pt's cognitive deficits through the use of compensatory strategies, the pt will be educated on 3 different memory strategies and verbalize how he/she might use them at home in 3 ways with  min cues. Patient utilized the repetition out loud memory strategy to complete a 5 minute delayed recall task of five images with 60% accuracy I, with cueing 100%. Utilizing the repetition out loud strategy, the patient followed 2-step directives with 40% accuracy I, with repetition of the directives from the %. Short-term Goals  Timeframe for Short-term Goals: n/a      Treatment/Activity Tolerance:  Patient tolerated treatment well    Plan:  Continue per POC    Pain Assessment:  Patient does not c/o pain. Pain Re-assessment:  Patient does not c/o pain. Patient/Caregiver Education:  Patient educated on session and progression towards goals.     Safety Devices:  Chair alarm in place        Speech Therapy Level of Assistance Scale    AUDITORY COMPREHENSION  Rating:  Supervised Assistance    VERBAL EXPRESSION  Rating:  Supervised Assistance    MOTOR SPEECH  Rating: Independent    PROBLEM SOLVING  Rating: Supervised Assistance    MEMORY  Rating:  Supervised Assistance        Therapy Time  SLP Individual Minutes  Time In: 1000  Time Out: 2224  Minutes: 30            Signature: GEOFF Josue

## 2023-03-07 NOTE — PROGRESS NOTES
Warfarin Dosing - Pharmacy Consult Note  Consulting Provider: EDELMIRA Alston - CNP    Indication:  Atrial Fibrillation  Warfarin Dose prior to admission: 4 mg T/TH/Sat/Sun, 5 mg M/W/F   Concurrent anticoagulants/antiplatelets: asa 84HD  Significant Drug Interactions: No obvious interactions  Recent Labs     03/05/23  0417 03/06/23  0416 03/07/23  1401   INR 3.0 3.1 2.8     Recent warfarin administrations                     warfarin (COUMADIN) tablet 2 mg (mg) 2 mg Given 03/05/23 1708    warfarin (COUMADIN) tablet 2 mg (mg) 2 mg Given 03/04/23 1724                   Date      INR       Dose  2/24       1.6        5 mg   2/25       1.9        4 mg   2/26       2.5        2 mg  2/27       2.9        2.5 mg   2/28       2.8         4 mg  3/1         3.2        HOLD  3/2         3.4        HOLD  3/3         3.4        HOLD  3/4         3.0         2 mg  3/5         3.0         2 mg  3/6         3.1        HOLD  3/7         2.8         1 mg      Assessment/Plan  (Goal INR: 2 - 3)  INR= 2.8, in goal range. Will order warfarin 1 mg x1 today. Active problem list reviewed. INR orders are placed. Chart reviewed for pertinent labs, drug/diet interactions, and past doses. Documentation of patient's clinical condition was reviewed. Pharmacy Dosing:  Pharmacy will continue to follow.      Vito Cross [facility]  03/07/23  3:15 PM

## 2023-03-07 NOTE — PROGRESS NOTES
Pt in bed with eyes closed , cpap in placed, no s/s of distress or discomfort noted call light in reach will continue to monitor

## 2023-03-07 NOTE — PROGRESS NOTES
Subjective: The patient complains of moderate to severe acute on chronic progressive fatigue and  PRIETO partially relieved by rest, medications, PT,  OT,   SLP and rest and exacerbated by recent illness  . Patient had initially been treated at [facility] [place] where a nerve stimulator placement was attempted but delayed due to high INR. Once the procedure was finally initiated it was aborted because of possible CSF leak. Patient became shortness of breath postprocedure but this was found to be largely unrelated to the procedure but related to a large loculated pleural effusion. Patient was transferred to [facility] for thoracotomy and VATS procedure performed by Dr. Karime Howell. As noted patient had a chest tube inserted by Dr. Karime Howell on 2/18/2023. He has been followed by thoracic surgery pulmonology oncology GI and cardiology as well as the hospitalist.     From a thoracic surgery standpoint cultures were sent from his pleural effusion which were positive and he is now on IV antibiotics. The left a Pleurx tube in O2   drain 3 times a week. The plan is for outpatient removal of the drain and if any worsening symptoms occur after the Pleurx is removed possible VATS surgery. They are trying to avoid surgery for now. Pulmonology followed him advising titrating O2 to keep sats above 90 continue home CPAP and continue draining the Pleurx daily. 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 He they noted his symptoms were improving and he has a history of following with Dr. Bárbara Pollack at [facility] in [facility] regarding his mesothelioma. Discussed options regarding possible chemotherapy palliative care intermittent drainage of the Pleurx catheter and possible hospice care. Currently they are advising treating anemia with IV Venofer here.   Cardiology was consulted and obtain an echocardiogram to assess the pericardial effusion    I am concerned about patients medical complexities and barriers to advancing in rehab goals including  improved pain control. He is complaining of flareup in his left shoulder x-rays reviewed. I would suggest heating pad, Lidoderm, pain medication and a left shoulder injection. I reviewed current care and plans for further care with other rehab providers including nursing and case management. According to recent nursing note, \"  Pt in bed with eyes closed , cpap in placed, no s/s of distress or discomfort noted call light in reach will continue to monitor\". He is still getting drainage out of his pleural cath partially 100 cc yesterday. He is left shoulder is feeling much better since having it injected by orthopedics Dr. Chelly Cha. ROS x10: The patient also complains of severely impaired mobility and activities of daily living. Otherwise no new problems with vision, hearing, nose, mouth, throat, dermal, cardiovascular, GI, , pulmonary, musculoskeletal, psychiatric or neurological. See also Acute Rehab PM&R H&P. Vital signs:  /62   Pulse 77   Temp 97.7 °F (36.5 °C) (Oral)   Resp 18   Ht 5' 7\" (1.702 m)   Wt 263 lb 6.4 oz (119.5 kg)   SpO2 94%   BMI 41.25 kg/m²   I/O:   PO/Intake:  fair PO intake,   Reg diet    Bowel:   continent LBM 3/1  Bladder: continent    bishop  General:  Patient is well developed,   adequately nourished, and    well kempt. HEENT:    Pupils equal, hearing intact to loud voice, external inspection of ear and nose benign. Inspection of lips, tongue and gums  thrush    Musculoskeletal: No significant change in strength or tone. All joints stable. Inspection and palpation of digits and nails show no clubbing, cyanosis or inflammatory conditions. Neuro/Psychiatric: Affect: flat but pleasant. Alert and oriented to person, place and situation with  min cues. No significant change in deep tendon reflexes or sensation  Lungs:  Diminished, CTA-B.  Respiration effort is   normal at rest.   Pleurx cath dressing D&I L Side. Heart:   S1 = S2,   RRR. Abdomen:  Soft, non-tender, no enlargement of liver or spleen. Extremities:   mod lower extremity edema    Skin:   Intact to general survey,  Pleurx cath dressing D&I L Side. Rehabilitation:  Physical Therapy:   Bed mobility:  Bed mobility  Rolling to Left: Minimal assistance (02/26/23 1242)  Rolling to Right: Minimal assistance (02/26/23 1242)  Supine to Sit: Moderate assistance (02/26/23 1242)  Sit to Supine: Moderate assistance (02/26/23 1242)  Bed Mobility Comments: HOB flat; cues to avoid breath holding (02/26/23 1242)  Roll Left  Assistance Level: Supervision (03/06/23 1605)  Skilled Clinical Factors: Increased time to complete good technique (03/06/23 1605)  Roll Right  Assistance Level: Supervision (03/06/23 1605)  Skilled Clinical Factors: With use of bed rail (03/02/23 1317)  Sit to Supine  Assistance Level: Supervision (03/06/23 1605)  Skilled Clinical Factors: Increased time to complete (03/06/23 1605)  Supine to Sit  Assistance Level: Supervision (03/06/23 1605)  Skilled Clinical Factors: Cues for breathing throughout transfer (03/06/23 1605)  Scooting  Assistance Level: Supervision (03/06/23 1605)  Transfers:  Transfers  Sit to Stand:  Moderate Assistance (02/26/23 1243)  Stand to Sit: Moderate Assistance (02/26/23 1243)  Comment: poor use of L LE during sit to stand; [employer] used (02/26/23 1243)  Sit to Stand  Assistance Level: Supervision (03/06/23 1605)  Skilled Clinical Factors: cont multiple attempts to stand (03/06/23 0927)  Stand to Sit  Assistance Level: Supervision (03/06/23 1605)  Skilled Clinical Factors: completed safely without vc (03/06/23 0927)  Bed To/From Chair  Technique: Stand step (03/06/23 1605)  Assistance Level: Supervision (03/06/23 1605)  Skilled Clinical Factors: Completes with [employer] (03/06/23 1605)  Car Transfer  Assistance Level: Minimal assistance (02/27/23 0942)  Skilled Clinical Factors: [place] to rise from low level chair able to lift BLE in and out of car (02/27/23 1230)  Gait:   Ambulation  Surface: Level tile (03/02/23 1406)  Device: Rolling Walker (03/02/23 1406)  Other Apparatus: O2 (03/02/23 1406)  Assistance: Contact guard assistance;Stand by assistance (03/02/23 1406)  Quality of Gait: cues for posture and upward gaze, decreased step length and height (03/02/23 1406)  Gait Deviations: Slow Jefe; Increased MENA; Decreased step length;Decreased step height (02/26/23 1244)  Distance: 100' (03/02/23 1406)  Ambulation  Surface: Carpet; Level surface; Uneven surface (03/06/23 1140)  Device: Rolling walker (03/06/23 1140)  Distance: 60' (03/06/23 1140)  Activity: Within Room (03/06/23 1140)  Activity Comments: decresaed hs bilat, decreased step length, vc for upward gaze (03/06/23 1140)  Additional Factors: Verbal cues (03/06/23 1140)  Assistance Level: Supervision (03/06/23 1140)  Gait Deviations: Slow jefe;Decreased heel strike left;Decreased step length bilateral (03/06/23 1140)  Skilled Clinical Factors: 2 liters O2 (03/06/23 1140)  Stairs:  Stairs/Curb  Stairs?: No (03/02/23 1406)  Stairs  Stair Height: 6'' (03/06/23 1140)  Device: Bilateral handrails (03/06/23 1140)  Number of Stairs: 4 (03/06/23 1140)  Additional Factors: Verbal cues; Non-reciprocal going down;Reciprocal going up (03/06/23 1140)  Assistance Level: Supervision (03/06/23 1140)  Skilled Clinical Factors: Completed with 2L 93% post stair negotiation improved 2 96% with seated rest break (03/02/23 1214)  W/C mobility:       Occupational Therapy:   Hand Dominance: Right  ADL  Feeding: Stand by assistance (02/26/23 1201)  Grooming: Minimal assistance (02/26/23 1201)  UE Bathing: Minimal assistance (02/26/23 1201)  LE Bathing: Maximum assistance (02/26/23 1201)  LE Bathing Skilled Clinical Factors: periare only per pt request (02/26/23 1201)  UE Dressing:  Moderate assistance (02/26/23 1201)  LE Dressing: Maximum assistance (02/26/23 1201)  Toileting: Maximum assistance (02/26/23 1201)  Additional Comments: sponge bath ADL completed sitting EOB and patially on toilet. (02/26/23 1201)  Toilet Transfers  Toilet - Technique: Ambulating (02/26/23 1207)  Equipment Used: Grab bars (02/26/23 1207)  Toilet Transfer: Moderate assistance (02/26/23 1207)  Toilet Transfers Comments: CGA on ModA off (02/26/23 1207)          Speech Therapy:      Comprehension:  (AUditory comprehension is [facility])  Verbal Expression:  (Supervised assist required for high level naming and reasoning tasks secondary to reduced thought organization)  Diet/Swallow:           Compensatory Swallowing Strategies : Alternate solids and liquids, Eat/Feed slowly, Upright as possible for all oral intake, Small bites/sips          Lab/X-ray studies reviewed, analyzed and discussed with patient and staff:   No results found for this or any previous visit (from the past 24 hour(s)). 3/5/23-CT  Left shoulder   Advanced osteoarthritis of the glenohumeral joint with intra-articular   ossified bodies. 2.  No acute fracture identified. 3.  Calcific tendinitis versus bursitis of the rotator cuff tendon,   subacromial subdeltoid bursa. XR ABDOMEN   2/18/2023   1. Complete opacification of left hemithorax compatible with some combination of pleural effusion and atelectasis. Secondary obscuration of left heart border. 2.  Nonobstructive bowel gas pattern. No acute abdominal disease identified. 3.  Probable left renal stone. CT CHEST   2/23/2023 : Mediastinum: Thyroid is homogeneous in appearance. Vascular calcifications seen within the coronary vessels. Cardiac chambers are mildly enlarged. Pacer leads in satisfactory position. Moderate-sized pericardial effusion. Bulky adenopathy identified in the left hilar region likely reactive. Small lymph nodes seen scattered throughout the mediastinum likely reactive.  Atherosclerotic disease seen diffusely throughout the thoracic aorta. Lungs/pleura: There is small chest tube identified in place on the left with small left pleural effusion. Airspace consolidation seen within the left upper and lower lung fields suggesting superimposed infiltrate such as pneumonia. Mild increased markings identified at the right lung base to suggest atelectatic change. Trace pneumothorax identified anteriorly. Upper Abdomen: Stones in the gallbladder. Small hiatal hernia. Soft Tissues/Bones: Multilevel degenerative changes seen within the spine. No acute chest wall abnormality. Indwelling chest tube identified on the left with small left pleural effusion and trace anterior pneumothorax. Consolidation seen in airspace disease throughout the left upper and lower lobes to suggest a multifocal superimposed pneumonia. Soft tissue density seen in the hilar region to suggest possible reactive adenopathy. Moderate-sized pericardial effusion. Minimal atelectatic changes seen at the right lung base. Incidental stones in the gallbladder with small hiatal hernia. XR CHEST   2/19/2023   1. Minimal partial clearing of the left lung. The previously noted left pleural effusion is smaller   2. Stable position of the left chest tube   3. The right lung is clear. XR CHEST  2/18/2023   1. Apparent interval placement of left chest tube catheter. No pneumothorax. 2.  Persistent opacification of the left hemithorax. XR CHEST  2/18/2023   1. Near complete whiteout of the left hemithorax likely represent a combination of partial collapse of the left lung and large pleural effusion   2. Cardiomegaly   3. The right lung is grossly clear. US DUP UPPER EXTREMITY RIGHT VENOUS  2/20/2023   No evidence of DVT. FLUORO FOR SURGICAL PROCEDURES  2/16/2023  12 spot images of the lumbar spine were obtained. Intraprocedural fluoroscopic spot images as above. See separate procedure report for more information.      EGD 2/21/2023  [facility] Patient: Subhash Mallory MRN: [id number] : 1935 Account: Gender: Male Age: 82 Years Procedure: Upper GI endoscopy Date: 2023 Attending Physician: Mireya Goff Indications:        -  Anemia        -  Melena Medications:        -  Monitored Anesthesia Care Complications:        -  No immediate complications. Estimated Blood Loss:        -  Estimated blood loss: none. Procedure:        - The Gastroscope was introduced through the mouth and advanced to the second           part of the duodenum.        -  The patient tolerated the procedure well. Findings:        -  A 2 cm hernia was found.        -  Esophagogastric landmarks were identified: the gastroesophageal junction           was found at 36 cm, the lower esophageal sphincter was found at 38 cm and the           upper extent of the gastric folds was found at 36 cm from the incisors. -  Patchy moderate inflammation characterized by erythema was found in the           gastric antrum and in the gastric body. -  The examined duodenum was normal.        -  The cardia and gastric fundus were normal on retroflexion.        - No old or fresh blood noted Impression:        -  2 cm hernia. -  Esophagogastric landmarks identified.        -  Gastritis. -  Normal examined duodenum.        -  No specimens collected. - No old or fresh blood noted Recommendation:        -  Put patient on a clear liquid diet starting today. -  Continue present medications.         -     - 72467, Esophagogastroduodenoscopy, flexible, transoral; diagnostic,           including collection of specimen(s) by brushing or washing, when performed           (separate procedure) Diagnosis Code(s):        - D64.9, Anemia, unspecified        - K92.1, Melena (includes Hematochezia)        - K44.9, Diaphragmatic hernia without obstruction or gangrene        - K29.70, Gastritis, unspecified, without bleeding           Previous extensive, complex labs, notes and diagnostics reviewed and analyzed. ALLERGIES:    Allergies as of 02/25/2023 - Fully Reviewed 02/25/2023   Allergen Reaction Noted    Benadryl [diphenhydramine hcl] Anxiety 01/31/2012    Diphenhydramine Anxiety 05/09/2018      (please also verify by checking MAR)       Recently, I evaluated this patient for periodic reassessment of medical and functional status. The patient was discussed in detail at the treatment team meeting focusing on current medical issues, progress in therapies, social issues, psychological issues, barriers to progress and strategies to address these barriers, and discharge planning. See the hand written addendum to rehab progress note. The patient continues to be high risk for future disability and their medical and rehabilitation prognosis continue to be good and therefore, we will continue the patient's rehabilitation course as planned. The patient's tentative discharge date was set. Patient and family education was discussed. The patient was made aware of the team discussion regarding their progress. Discharge plans were discussed along with barriers to progress and strategies to address these barriers, patient encouraged to continue to discuss discharge plans with . Complex Physical Medicine & Rehab Issues Assess & Plan:   Severe abnormality of gait and mobility and impaired self-care and ADL's secondary to progressive cardiopulmonary debility. Functional and medical status reassessed regarding patients ability to participate in therapies and patient found to be able to participate in acute intensive comprehensive inpatient rehabilitation program including PT/OT to improve balance, ambulation, ADLs, and to improve the P/AROM. Therapeutic modifications regarding activities in therapies, place, amount of time per day and intensity of therapy made daily. In bed therapies or bedside therapies prn.    Bowel and Bladder dysfunction  , Neurogenic bowel and bladder:  frequent toileting, ambulate to bathroom with assistance, check post void residuals. Check for C.difficile x1 if >2 loose stools in 24 hours, continue bowel & bladder program.  Monitor bowel and bladder function. Lactinex 2 PO every AC. MOM prn, Brown Bomb prn, Glycerin suppository prn, enema prn. Encourage therapy and nursing to co-treat and problem solve re continence. Severe back pain, lumbar, as well as generalized OA pain: reassess pain every shift and prior to and after each therapy session, give prn Tylenol and consider scheduled Tylenol, modalities prn in therapy, masage, Lidoderm, K-pad prn. Consider scheduled AM pain meds. Consult Ortho for left shoulder injection  Skin healing  ears and breakdown risk:  continue pressure relief program.  Daily skin exams and reports from nursing. Fatigue due to nutritional and hydration deficiency: Add and titrate vitamin B12 vitamin D and CoQ10 continue to monitor I&Os, calorie counts prn, dietary consult prn. Add healthy snack at night. Acute episodic insomnia with situational adjustment disorder:  prn Ambien, monitor for day time sedation. Falls risk elevated:  patient to use call light to get nursing assistance to get up, bed and chair alarm. Elevated DVT risk: progressive activities in PT, continue prophylaxis JOB hose, elevation and meds-see MAR. Complex discharge planning: Discharge March 12, 2023 home with his wife and home health care. Weekly team meeting every Monday to re-assess progress towards goals, discuss and address social, psychological and medical comorbidities and to address difficulties they may be having progressing in therapy. Patient and family education is in progress. The patient is to follow-up with their family physician after discharge.         Complex Active General Medical Issues that complicate care Assess & Plan:    Mesothelioma with dry cough-titrate O2, aerosols, follow-up with Dr. Sanjuanita Arana, drain pleural Dex daily versus 3 times a week per protocols from pulmonology and lung surgery consult  Mixed hyperlipidemia,  Atherosclerosis of native artery of both lower extremities with intermittent claudication, Atrial fibrillation, Venous insufficiency-Acute rehab to monitor heart rate and rhythm with the option of telemetry and the effects of chronotropic medication with respect to increasing physical activity and exercise in PT, OT, ADLs with medication titration to lowest effective dosing. Continue blood signs every shift focusing on heart rate, rhythm and blood pressure checks with orthostatic checks-monitoring the effect of exercise, therapy and posture. Consult hospitalist for backup medical and adjust/add medications (aspirin, Lasix, Lopressor, Crestor, Coumadin, bridging Lovenox). Monitor heart rate and blood pressure as well as medications effects on vital signs before during and after therapy with especial focus on preventing orthostasis and falls risk. Recheck CBC and BMP. Gastritis without bleeding-Elevate head of bed after meals, monitor stools for blood, lowest effective dose of PPI, consider Tums. Pneumonia of both lungs due to infectious organism-Merrem Acute rehab for endurance traing with Pulse Ox to monitoring oxygen saturation and heart rate with O2 titration to lowest effective dose. Pulse oximeter checks to shift and at HS to dose and titrate oxygen and aerosol treatments monitor for nocturnal hypoxemia, monitor vital signs, oxygen prn. Focus on energy conservation. eft a Pleurx tube in O2   drain  GERD-Elevate head of bed after meals, monitor stools for blood, lowest effective dose of PPI, consider Tums. Anticoagulation coumadinization for G-emo-nvvzjte pharmacist regarding INR management    Acute kidney failure-Eliminate toxic medications, monitor I's and O's focusing on urine output, recheck BMP.     Spinal stenosis of lumbar region with neurogenic claudication    Balance disorder-focus on balance and therapy     Focus on coordinating dc plans with patient family and care givers and order necessary equipment.             Electronically signed by Nurys Simmons DO on 2/27/23 at 8:24 AM WARREN Regalado D.O., PM&R     Attending    [facility] Anne Mishra

## 2023-03-07 NOTE — PROGRESS NOTES
Physical Therapy Rehab Treatment Note  Fac[facility]/Department: [facility]  [facility]       NAME: Nancy Lam  : 1935 (82 y.o.)  MRN: [id number]  CODE STATUS: Full Code    Date of Service: 3/7/2023       Restrictions:  Restrictions/Precautions: Fall Risk  Position Activity Restriction  Other position/activity restrictions: pleurx on L side    SUBJECTIVE:   Subjective: \" I got an injection and it worked for a little bit\"    Pain  Pain: 2/10 L shoulder pain Sore declines intervention    OBJECTIVE:     Sit to Stand  Assistance Level: Supervision  Stand to Sit  Assistance Level: Supervision  Bed To/From Chair  Technique: Stand step  Skilled Clinical Factors: Completes with [employer]  Car Transfer  Assistance Level: Stand by assist    Ambulation  Surface: Carpet; Level surface; Uneven surface  Device: Rolling walker  Distance: 75'  Activity: Within Unit  Activity Comments: Improved heel strike/ pace with shoes donned  Additional Factors: Verbal cues  Assistance Level: Supervision  Gait Deviations: Slow jefe;Decreased heel strike left;Decreased step length bilateral    Stairs  Stair Height: 6''  Device: Bilateral handrails  Number of Stairs: 4  Additional Factors: Verbal cues; Non-reciprocal going down;Reciprocal going up  Assistance Level: Supervision  Skilled Clinical Factors: 92% post stairs requires seated rest break due to SOB    PT Exercises  A/AROM Exercises: Seated abdominal bracing x 10 Seated trunk extension into physioball x 10 seateaed rows RTB x 20     ASSESSMENT/PROGRESS TOWARDS GOALS:   Assessment  Assessment: Patient continues to to exhibit SOB with exertion requires seated rest breaks to continues.  Improved stability with shoes donned during gait and stair negotiation  Activity Tolerance: Patient tolerated treatment well  Discharge Recommendations: Continue to assess pending progress    Goals:  Long Term Goals  Long Term Goal 1: indep bed mobility  Long Term Goal 2: Pt will demonstrate transfers indep with safest AD  Long Term Goal 3: Pt will demonstrate amb >/= 150ft supervision with safest AD - indep for 30 feet  Long Term Goal 4: Pt will demonstrate stair negotiation 3 steps without rails with safest AD SBA  Long Term Goal 5: Pt will demonstrate pantoja balance assessment >/= 45/56 for decreased risk for falls. Patient Goals   Patient Goals : Saud Schwab out of chairs easier. drive a car.  stand for longer time. \"    PLAN OF CARE/Safety:   Safety Devices  Type of Devices: All fall risk precautions in place; Chair alarm in place      Therapy Time:   Individual   Time In 0900   Time Out 0930   Minutes 30     Minutes: 30  Transfer/Bed mobility training: 10  Gait training: 15  Therapeutic ex: [facility] [address], PTA, 03/07/23 at 11:28 AM

## 2023-03-07 NOTE — PROGRESS NOTES
OCCUPATIONAL THERAPY  INPATIENT REHAB TREATMENT NOTE  [address]      NAME: Noemi Pal  : 1935 (82 y.o.)  MRN: [id number]  CODE STATUS: Full Code  Room: J398/R952-64    Date of Service: 3/7/2023    Referring Physician: Dr. Claudette Campanile Diagnosis: Impaired mobility and ADL's due to severe pulmonary debility    Restrictions  Restrictions/Precautions  Restrictions/Precautions: Fall Risk         Position Activity Restriction  Other position/activity restrictions: pleurx on L side    Patient's date of birth confirmed: Yes    SAFETY:  Safety Devices  Safety Devices in place: Yes  Type of devices: All fall risk precautions in place    SUBJECTIVE:       Pain at start of treatment: Yes: 210    Pain at end of treatment: Yes: 4/10    Location: L shoulder anteriorly  Nursing notified: No  Intervention: RN provided pain medication    COGNITION:  Orientation  Overall Orientation Status: Within Normal Limits  Orientation Level: Oriented X4  Cognition  Overall Cognitive Status: WFL          OBJECTIVE:     Pt completed STS with SBA, pt demo mod difficulty. Pt completed ROM arc with SBA. Pt instructed to complete half arc with L arm then switch midway to R arm to finishing taking each ring across midline, d/t pt experiencing pain past midway with LUE shoulder. Pt stood ~ 8 min to complete task, demoing F- balance during switching of hands and relying heavily on UB support during task. Pt completed task each to the R and to the L before sitting. Pt completed task to increase LUE ROM, BUE strength, standing balance/tolerance to increase ease and Ind with functional ADL tasks. Sit to Stand  Assistance Level: Stand by assist  Stand to Sit  Assistance Level: Stand by assist     Pt completed golf kameron peg board task seated with Sup to task. Provided demo and instruction to task for switching arms back and forth and using Rue to assist LUE for reaching if LUE was too painful to reach to top of board.   Pt completed AAROM for LUE on a prn basis as it was starting to get painful. Pt ranked pain at end at about a 4/10 pain, but did appear to a bit more painful than a 4/10 through body language and breathing patterns. Pt instructed to take pegs out one by one using RUE only with faciliation of reaching forward across board to place golf tees back into box. Pt completed task to increase functional act tolerance, LUE reaching, BUE dexterity and manipulation to continue improving functional ease and Ind with ADL tasks. ASSESSMENT:  Assessment: pt states he got an injection in the L shoulder and if feels \"better. \" Still demo difficulty with ROM tasks. Cooperative. Activity Tolerance: Patient tolerated treatment well      PLAN OF CARE:  Balance training, Functional mobility training, Strengthening, ROM, Endurance training, Safety education & training, Patient/Caregiver education & training, Equipment evaluation, education, & procurement, Home management training, Self-Care / ADL, Coordination training  Continue OT POC until discharge    Patient goals : to get stronger and possibly return to driving  Time Frame for Long Term Goals : within 1.5-2wks pt will demosntrate progress in the following areas to achieve specific LTG's listed in the initial eval  Long Term Goal 1: improve overall strength/endurance for functional tasks. Long Term Goal 2: improve independence with self care  Long Term Goal 3: improve sitting/standing balance for ADL tasks  Long Term Goal 4: improve functional mobility with LRD for safe item transport/retrieval  Long Term Goal 5: improve FMC to independently manipulate fasteners        Therapy Time:   Individual Group Co-Treat   Time In 1030       Time Out 1100         Minutes 30               Neuromuscular reeducation: 15 minutes  Therapeutic activities: 15 minutes     Electronically signed by:     HERMAN Sauceda,   3/7/2023, 10:43 AM

## 2023-03-08 LAB
ANION GAP SERPL CALCULATED.3IONS-SCNC: 14 MEQ/L (ref 9–15)
BUN BLDV-MCNC: 27 MG/DL (ref 8–23)
CALCIUM SERPL-MCNC: 8.7 MG/DL (ref 8.5–9.9)
CHLORIDE BLD-SCNC: 104 MEQ/L (ref 95–107)
CO2: 21 MEQ/L (ref 20–31)
CREAT SERPL-MCNC: 1.06 MG/DL (ref 0.7–1.2)
FERRITIN: 516 NG/ML (ref 30–400)
FOLATE: 2.9 NG/ML
GFR SERPL CREATININE-BSD FRML MDRD: >60 ML/MIN/{1.73_M2}
GLUCOSE BLD-MCNC: 122 MG/DL (ref 70–99)
HCT VFR BLD CALC: 20.5 % (ref 42–52)
HCT VFR BLD CALC: 22.3 % (ref 42–52)
HEMOGLOBIN: 6.8 G/DL (ref 14–18)
HEMOGLOBIN: 7.2 G/DL (ref 14–18)
INR BLD: 3.2
IRON SATURATION: 17 % (ref 20–55)
IRON: 34 UG/DL (ref 59–158)
MCH RBC QN AUTO: 28.1 PG (ref 27–31.3)
MCHC RBC AUTO-ENTMCNC: 33.4 % (ref 33–37)
MCV RBC AUTO: 84.2 FL (ref 79–92.2)
PDW BLD-RTO: 17.8 % (ref 11.5–14.5)
PLATELET # BLD: 319 K/UL (ref 130–400)
POTASSIUM SERPL-SCNC: 4.5 MEQ/L (ref 3.4–4.9)
PROTHROMBIN TIME: 32.6 SEC (ref 12.3–14.9)
RBC # BLD: 2.43 M/UL (ref 4.7–6.1)
SODIUM BLD-SCNC: 139 MEQ/L (ref 135–144)
TOTAL IRON BINDING CAPACITY: 204 UG/DL (ref 250–450)
UNSATURATED IRON BINDING CAPACITY: 170 UG/DL (ref 112–347)
VITAMIN B-12: 1286 PG/ML (ref 232–1245)
WBC # BLD: 10.8 K/UL (ref 4.8–10.8)

## 2023-03-08 PROCEDURE — 36415 COLL VENOUS BLD VENIPUNCTURE: CPT

## 2023-03-08 PROCEDURE — 97112 NEUROMUSCULAR REEDUCATION: CPT

## 2023-03-08 PROCEDURE — 80048 BASIC METABOLIC PNL TOTAL CA: CPT

## 2023-03-08 PROCEDURE — 97530 THERAPEUTIC ACTIVITIES: CPT

## 2023-03-08 PROCEDURE — 82728 ASSAY OF FERRITIN: CPT

## 2023-03-08 PROCEDURE — 85018 HEMOGLOBIN: CPT

## 2023-03-08 PROCEDURE — 85610 PROTHROMBIN TIME: CPT

## 2023-03-08 PROCEDURE — 97535 SELF CARE MNGMENT TRAINING: CPT

## 2023-03-08 PROCEDURE — 1180000000 HC REHAB R&B

## 2023-03-08 PROCEDURE — 99232 SBSQ HOSP IP/OBS MODERATE 35: CPT | Performed by: PHYSICAL MEDICINE & REHABILITATION

## 2023-03-08 PROCEDURE — 97110 THERAPEUTIC EXERCISES: CPT

## 2023-03-08 PROCEDURE — 82746 ASSAY OF FOLIC ACID SERUM: CPT

## 2023-03-08 PROCEDURE — 85027 COMPLETE CBC AUTOMATED: CPT

## 2023-03-08 PROCEDURE — 85014 HEMATOCRIT: CPT

## 2023-03-08 PROCEDURE — 6370000000 HC RX 637 (ALT 250 FOR IP): Performed by: NURSE PRACTITIONER

## 2023-03-08 PROCEDURE — 6370000000 HC RX 637 (ALT 250 FOR IP): Performed by: FAMILY MEDICINE

## 2023-03-08 PROCEDURE — 83540 ASSAY OF IRON: CPT

## 2023-03-08 PROCEDURE — 82607 VITAMIN B-12: CPT

## 2023-03-08 PROCEDURE — 6370000000 HC RX 637 (ALT 250 FOR IP): Performed by: REGISTERED NURSE

## 2023-03-08 PROCEDURE — 6370000000 HC RX 637 (ALT 250 FOR IP): Performed by: PHYSICAL MEDICINE & REHABILITATION

## 2023-03-08 PROCEDURE — 97116 GAIT TRAINING THERAPY: CPT

## 2023-03-08 PROCEDURE — 83550 IRON BINDING TEST: CPT

## 2023-03-08 RX ORDER — DOCUSATE SODIUM 100 MG/1
100 CAPSULE, LIQUID FILLED ORAL NIGHTLY
Status: DISCONTINUED | OUTPATIENT
Start: 2023-03-08 | End: 2023-03-13 | Stop reason: HOSPADM

## 2023-03-08 RX ADMIN — Medication 5 MG: at 20:59

## 2023-03-08 RX ADMIN — METOPROLOL TARTRATE 25 MG: 25 TABLET, FILM COATED ORAL at 20:59

## 2023-03-08 RX ADMIN — ACETAMINOPHEN 650 MG: 325 TABLET ORAL at 13:58

## 2023-03-08 RX ADMIN — METOPROLOL TARTRATE 25 MG: 25 TABLET, FILM COATED ORAL at 08:47

## 2023-03-08 RX ADMIN — GUAIFENESIN 600 MG: 600 TABLET ORAL at 08:47

## 2023-03-08 RX ADMIN — ACETAMINOPHEN 650 MG: 325 TABLET ORAL at 20:59

## 2023-03-08 RX ADMIN — POTASSIUM CHLORIDE 10 MEQ: 750 CAPSULE, EXTENDED RELEASE ORAL at 08:47

## 2023-03-08 RX ADMIN — Medication 100 MG: at 08:47

## 2023-03-08 RX ADMIN — ACETAMINOPHEN 650 MG: 325 TABLET ORAL at 08:46

## 2023-03-08 RX ADMIN — DOCUSATE SODIUM 100 MG: 100 CAPSULE, LIQUID FILLED ORAL at 20:59

## 2023-03-08 RX ADMIN — FUROSEMIDE 20 MG: 20 TABLET ORAL at 08:47

## 2023-03-08 RX ADMIN — NALOXEGOL OXALATE 25 MG: 12.5 TABLET, FILM COATED ORAL at 08:47

## 2023-03-08 RX ADMIN — GUAIFENESIN 600 MG: 600 TABLET ORAL at 20:59

## 2023-03-08 RX ADMIN — ASPIRIN 81 MG: 81 TABLET, COATED ORAL at 08:48

## 2023-03-08 RX ADMIN — Medication 2000 UNITS: at 16:53

## 2023-03-08 RX ADMIN — ROSUVASTATIN CALCIUM 10 MG: 5 TABLET, FILM COATED ORAL at 08:47

## 2023-03-08 ASSESSMENT — PAIN SCALES - GENERAL
PAINLEVEL_OUTOF10: 0
PAINLEVEL_OUTOF10: 4
PAINLEVEL_OUTOF10: 0

## 2023-03-08 ASSESSMENT — PAIN DESCRIPTION - PAIN TYPE: TYPE: CHRONIC PAIN

## 2023-03-08 ASSESSMENT — PAIN DESCRIPTION - ORIENTATION: ORIENTATION: LEFT

## 2023-03-08 ASSESSMENT — PAIN DESCRIPTION - LOCATION: LOCATION: SHOULDER

## 2023-03-08 ASSESSMENT — PAIN - FUNCTIONAL ASSESSMENT: PAIN_FUNCTIONAL_ASSESSMENT: PREVENTS OR INTERFERES SOME ACTIVE ACTIVITIES AND ADLS

## 2023-03-08 NOTE — PROGRESS NOTES
Pt. H&H rechecked above 7. Pt made aware. Pt. Has already spoken to his wife regarding the  hemoglobin. She will be in later. Dr. Zainab Bello wrote when draining <50 cc three times in a row it can come out. Pt. To follow up with Dr. Zainab Bello in clinic in 3 weeks. Family education to drain pleura catheter. Wife, son, daughter and granddaughter whom is a nurse and will be assisting with draining the pleura catheter. All questions answered.

## 2023-03-08 NOTE — PLAN OF CARE
Problem: Discharge Planning  Goal: Discharge to home or other facility with appropriate resources  Outcome: Progressing  Flowsheets (Taken 3/8/2023 0854)  Discharge to home or other facility with appropriate resources:   Arrange for needed discharge resources and transportation as appropriate   Identify discharge learning needs (meds, wound care, etc)     Problem: Pain  Goal: Verbalizes/displays adequate comfort level or baseline comfort level  Outcome: Progressing     Problem: Safety - Adult  Goal: Free from fall injury  Outcome: Progressing     Problem: ABCDS Injury Assessment  Goal: Absence of physical injury  Outcome: Progressing     Problem: Chronic Conditions and Co-morbidities  Goal: Patient's chronic conditions and co-morbidity symptoms are monitored and maintained or improved  Outcome: Progressing  Flowsheets (Taken 3/8/2023 0854)  Care Plan - Patient's Chronic Conditions and Co-Morbidity Symptoms are Monitored and Maintained or Improved: Monitor and assess patient's chronic conditions and comorbid symptoms for stability, deterioration, or improvement     Problem: Skin/Tissue Integrity  Goal: Absence of new skin breakdown  Description: 1. Monitor for areas of redness and/or skin breakdown  2. Assess vascular access sites hourly  3. Every 4-6 hours minimum:  Change oxygen saturation probe site  4. Every 4-6 hours:  If on nasal continuous positive airway pressure, respiratory therapy assess nares and determine need for appliance change or resting period.   Outcome: Progressing     Problem: Pain  Goal: Verbalizes/displays adequate comfort level or baseline comfort level  Outcome: Progressing     Problem: Safety - Adult  Goal: Free from fall injury  Outcome: Progressing     Problem: ABCDS Injury Assessment  Goal: Absence of physical injury  Outcome: Progressing     Problem: Chronic Conditions and Co-morbidities  Goal: Patient's chronic conditions and co-morbidity symptoms are monitored and maintained or improved  Outcome: Progressing  Flowsheets (Taken 3/8/2023 3568)  Care Plan - Patient's Chronic Conditions and Co-Morbidity Symptoms are Monitored and Maintained or Improved: Monitor and assess patient's chronic conditions and comorbid symptoms for stability, deterioration, or improvement

## 2023-03-08 NOTE — PROGRESS NOTES
2045-HS assessment completed. Vitals stable on RA. Pt denies CP/SOB at this time. Left pleurex dressing clean, dry, intact. Minimal crackles heard in left lower base. Pt denies need for pain medication. Falls precautions in place. Call light in reach. Team to continue to monitor. Electronically signed by Dorathy Meigs, RN on 3/7/2023 at 9:00 PM     [phone] Perfectserve to Dr. Evelina Kaur with hmg result of 6.8. No obvious signs of bleeding apparent. 2250-Dr. Antunez to bedside to discuss blood transfusion. Pt declined at this time.

## 2023-03-08 NOTE — PROGRESS NOTES
Physical Therapy Rehab Treatment Note  Facility/Department: [facility]  Room: [facility]/U731-50       NAME: Dyllan Fenton  : 1935 (82 y.o.)  MRN: [id number]  CODE STATUS: Full Code    Date of Service: 3/8/2023       Restrictions:  Restrictions/Precautions: Fall Risk  Position Activity Restriction  Other position/activity restrictions: pleurx on L side    SUBJECTIVE:   Subjective: \"I have had a rough morning\"    Pain  Pain: 4/10 L shoulder pain achy throbbing medicated prior to treatment    OBJECTIVE:       Sit to Stand  Assistance Level: Supervision;Modified independent  Skilled Clinical Factors: Completes of firsst try good technique  Stand to Sit  Assistance Level: Modified independent  Skilled Clinical Factors: completed safely without vc    Ambulation  Surface: Carpet; Level surface; Uneven surface  Device: Rolling walker  Distance: 200'  Activity: Within Unit  Activity Comments: Improved heel strike/ pace with shoes donned  Additional Factors: Verbal cues  Assistance Level: Stand by assist  Gait Deviations: Slow jefe;Decreased heel strike left;Decreased step length bilateral  Skilled Clinical Factors: Decreased fatigue this session requires decreased seated rest break Spo2 95% post gait    Stairs  Stair Height: 6''  Device: Bilateral handrails  Number of Stairs: 4  Additional Factors: Verbal cues; Non-reciprocal going down;Reciprocal going up  Assistance Level: Supervision    PT Exercises  Exercise Treatment: Seated exercises: LAQ's, Hip flexion, Ball squeezes, Hip abduction MRE x 20     ASSESSMENT/PROGRESS TOWARDS GOALS:   Assessment  Assessment: Patient continues to make progress towards gait and transfer goals requiring decreased effort and assistance to complete. Continue to progress standing balance in p.m.   Activity Tolerance: Patient tolerated treatment well  Discharge Recommendations: Continue to assess pending progress    Goals:  Long Term Goals  Long Term Goal 1: indep bed mobility  Long Term Goal 2: Pt will demonstrate transfers indep with safest AD  Long Term Goal 3: Pt will demonstrate amb >/= 150ft supervision with safest AD - indep for 30 feet  Long Term Goal 4: Pt will demonstrate stair negotiation 3 steps without rails with safest AD SBA  Long Term Goal 5: Pt will demonstrate pantoja balance assessment >/= 45/56 for decreased risk for falls. Patient Goals   Patient Goals : Modesta Olivares out of chairs easier. drive a car.  stand for longer time. \"    PLAN OF CARE/Safety:   Safety Devices  Type of Devices: All fall risk precautions in place; Chair alarm in place      Therapy Time:   Individual   Time In 1100   Time Out 1130   Minutes 30     Minutes: 30  Transfer/Bed mobility trainin  Gait training: 15  Therapeutic ex: COLE Wilkins, 23 at 12:16 PM

## 2023-03-08 NOTE — PROGRESS NOTES
Physical Therapy Rehab Treatment Note  [facility]Department[facility]  [facility]       NAME: Ulysses Admire  : 1935 (82 y.o.)  MRN: [id number]  CODE STATUS: Full Code    Date of Service: 3/8/2023     Restrictions:  Restrictions/Precautions: Fall Risk  Position Activity Restriction  Other position/activity restrictions: pleurx on L side    SUBJECTIVE:   Subjective: \"I have had a rough morning\"    Pain  Pain: 3/10 L shoulder pain achy throbbing declines intervention    OBJECTIVE:     Roll Left  Assistance Level: Modified independent  Skilled Clinical Factors: HOB flat with use of bed rail  Roll Right  Assistance Level: Modified independent  Skilled Clinical Factors: With use of bed rail  Sit to Supine  Assistance Level: Modified independent  Supine to Sit  Assistance Level: Modified independent  Scooting  Assistance Level: Modified independent    Sit to Stand  Assistance Level: Modified independent  Skilled Clinical Factors: Completes of firsst try good technique  Stand to Sit  Assistance Level: Modified independent  Skilled Clinical Factors: completed safely without vc  Bed To/From Chair  Technique: Stand step  Assistance Level: Modified independent  Skilled Clinical Factors: Completes with [employer]    Ambulation  Surface: Carpet; Level surface; Uneven surface  Device: Rolling walker  Distance: 50'  Activity: Within Unit  Activity Comments: Improved heel strike/ pace with shoes donned  Additional Factors: Verbal cues  Assistance Level: Modified Independent  Gait Deviations: Slow jefe;Decreased heel strike left;Decreased step length bilateral  Skilled Clinical Factors: Decreased fatigue this session requires decreased seated rest break Spo2 95% post gait    Stairs  Stair Height: 6''  Device: Bilateral handrails  Number of Stairs: 4  Additional Factors: Verbal cues; Non-reciprocal going down;Reciprocal going up  Assistance Level: Supervision    Neuromuscular Education  NDT Treatment: Standing  Neuromuscular Comments: TEIXEIRA balance tasks in unsupported standing to imrpove standing balance and improve ADL tasks; 2 min standing, NBOS, semi-tandem, 4\" box taps with BUE and UUE support x 10 each    Activity Tolerance  Activity Tolerance: Patient tolerated treatment well    ASSESSMENT/PROGRESS TOWARDS GOALS:   Assessment  Assessment: Therapy session focused on improving standing balance with UUE support and without UE support. Exhibits good safety with short distance gait and stair  negotiation  Activity Tolerance: Patient tolerated treatment well  Discharge Recommendations: Continue to assess pending progress    Goals:  Long Term Goals  Long Term Goal 1: indep bed mobility  Long Term Goal 2: Pt will demonstrate transfers indep with safest AD  Long Term Goal 3: Pt will demonstrate amb >/= 150ft supervision with safest AD - indep for 30 feet  Long Term Goal 4: Pt will demonstrate stair negotiation 3 steps without rails with safest AD SBA  Long Term Goal 5: Pt will demonstrate teixeira balance assessment >/= 45/56 for decreased risk for falls. Patient Goals   Patient Goals : Annice Sox out of chairs easier. drive a car.  stand for longer time. \"    PLAN OF CARE/Safety:   Safety Devices  Type of Devices: All fall risk precautions in place; Chair alarm in place      Therapy Time:   Individual   Time In 1300   Time Out 1400   Minutes 60     Minutes: 60  Transfer/Bed mobility trainin  Gait trainin  Neuro re education: [address], [place], 23 at 4:19 PM

## 2023-03-08 NOTE — PROGRESS NOTES
OCCUPATIONAL THERAPY  INPATIENT REHAB TREATMENT NOTE  [address]      NAME: Ritesh Pérez  : 1935 (82 y.o.)  MRN: [id number]  CODE STATUS: Full Code  Room: C153/E952-87    Date of Service: 3/8/2023    Referring Physician: Dr. Isaias Elliott Diagnosis: Impaired mobility and ADL's due to severe pulmonary debility    Restrictions  Restrictions/Precautions  Restrictions/Precautions: Fall Risk         Position Activity Restriction  Other position/activity restrictions: pleurx on L side    Patient's date of birth confirmed: Yes    SAFETY:  Safety Devices  Safety Devices in place: Yes  Type of devices: All fall risk precautions in place    SUBJECTIVE:  Subjective: \" Oh ok. \"    Pain at start of treatment: No    Pain at end of treatment: No      COGNITION:  Orientation  Overall Orientation Status: Within Normal Limits  Orientation Level: Oriented X4  Cognition  Overall Cognitive Status: Exceptions          OBJECTIVE:         Toileting  Assistance Level: Supervision  Skilled Clinical Factors: none  Toilet Transfers  Technique: Stand step  Equipment: Standard toilet  Additional Factors: With handrails  Assistance Level: Supervision  Skilled Clinical Factors: none         Functional Mobility  Device: Rolling walker  Activity: To/From bathroom  Assistance Level: Supervision  Sit to Stand  Assistance Level: Supervision  Stand to Sit  Assistance Level: Supervision     Provided nut and bolt task seated upright in bed. Pt completed task seated using BUE hands. Pt was able to screw nuts and bolts together with BUE still demo'ing slight difficulty d/t sensory impairments in LUE hand but demo'ing improvements with fine motor from when task was 1st initially done in therapy. Pt was efficient with good pacing. Pt completed task to improve fine/gross motor coordination to continue improving pt ease and independence with functional ADL tasks. ASSESSMENT:  Assessment: Pt laying on side in bed.   Pt able to pull himself up in bed. Able to transfer to EOB from supine without assist. Improving with all functional tasks; needs to go over sock aide as pt states he completes donning socks at home this way  Activity Tolerance: Patient tolerated treatment well      PLAN OF CARE:  Balance training, Functional mobility training, Strengthening, ROM, Endurance training, Safety education & training, Patient/Caregiver education & training, Equipment evaluation, education, & procurement, Home management training, Self-Care / ADL, Coordination training  Continue OT POC until discharge    Patient goals : to get stronger and possibly return to driving  Time Frame for Long Term Goals : within 1.5-2wks pt will demosntrate progress in the following areas to achieve specific LTG's listed in the initial eval  Long Term Goal 1: improve overall strength/endurance for functional tasks. Long Term Goal 2: improve independence with self care  Long Term Goal 3: improve sitting/standing balance for ADL tasks  Long Term Goal 4: improve functional mobility with LRD for safe item transport/retrieval  Long Term Goal 5: improve FMC to independently manipulate fasteners        Therapy Time:   Individual Group Co-Treat   Time In 1420       Time Out 1440         Minutes 20             Therapeutic activity trainin minutes    Electronically signed by:     HERMAN Becerra,   3/8/2023, 3:58 PM

## 2023-03-08 NOTE — PROGRESS NOTES
OCCUPATIONAL THERAPY  INPATIENT REHAB TREATMENT NOTE  [address] [address]      NAME: Gloria Oglesby  : 1935 (82 y.o.)  MRN: [id number]  CODE STATUS: Full Code  Room: H701/Q737-87    Date of Service: 3/8/2023    Referring Physician: Dr. Ugarte Diagnosis: Impaired mobility and ADL's due to severe pulmonary debility    Restrictions  Restrictions/Precautions  Restrictions/Precautions: Fall Risk         Position Activity Restriction  Other position/activity restrictions: pleurx on L side    Patient's date of birth confirmed: Yes    SAFETY:  Safety Devices  Safety Devices in place: Yes  Type of devices: All fall risk precautions in place    SUBJECTIVE:  Subjective: \" Im in there 5 min max. \"    Pain at start of treatment: No    Pain at end of treatment: Yes: 3/10    Location: L shoulder  Nursing notified: No  Intervention: RN provided pain medication    COGNITION:  Orientation  Overall Orientation Status: Within Normal Limits  Orientation Level: Oriented X4  Cognition  Overall Cognitive Status: WFL      Pt's current cognitive status is:  Comprehension: Independent  Expression: Independent  Social Interaction: Independent  Problem Solving: Independent  Memory: Independent    OBJECTIVE:         Feeding  Assistance Level: Independent  Grooming/Oral Hygiene  Assistance Level: Independent  Upper Extremity Bathing  Assistance Level: Modified independent  Lower Extremity Bathing  Assistance Level: Modified independent  Skilled Clinical Factors: with use of grab bars- pt states he washed his feet the best he could- he did not ask for assistance  Upper Extremity Dressing  Assistance Level: Modified independent  Lower Extremity Dressing  Equipment Provided: Reachers  Assistance Level: Supervision  Skilled Clinical Factors: used reacher to don underwear over LLE, no AE to don pants, Sup for all sitting/standing dressing d/t decreased balance when standing  Putting On/Taking Off Footwear  Assistance Level:  Moderate assistance  Skilled Clinical Factors: TD (A) for donning JOB hose and Sup for donning shoes  Toileting  Skilled Clinical Factors: none  Toilet Transfers  Skilled Clinical Factors: none  Tub/Shower Transfers  Type: Shower  Transfer From: Rolling walker  Transfer To: Shower chair with back  Assistance Level: Supervision  Skilled Clinical Factors: improving not needing to take walker into shower anymore; able to transition from walker to sink to grab bar         Functional Mobility  Device: Rolling walker  Activity: To/From bathroom  Assistance Level: Supervision  Sit to Stand  Assistance Level: Supervision  Stand to Sit  Assistance Level: Supervision         Education:   Education on donning shoes with long handled shoe horn        ASSESSMENT:  Assessment: pt states he does not have has much pain in LUE today, able to move it for taping pluerx area without grimacing but still demo decreased shoulder flex/ext ROM, can raise ~ 35-45 degrees. Pt cooperative, good mood. Activity Tolerance: Patient tolerated treatment well      PLAN OF CARE:  Balance training, Functional mobility training, Strengthening, ROM, Endurance training, Safety education & training, Patient/Caregiver education & training, Equipment evaluation, education, & procurement, Home management training, Self-Care / ADL, Coordination training  Continue OT POC until discharge    Patient goals : to get stronger and possibly return to driving  Time Frame for Long Term Goals : within 1.5-2wks pt will demosntrate progress in the following areas to achieve specific LTG's listed in the initial eval  Long Term Goal 1: improve overall strength/endurance for functional tasks.   Long Term Goal 2: improve independence with self care  Long Term Goal 3: improve sitting/standing balance for ADL tasks  Long Term Goal 4: improve functional mobility with LRD for safe item transport/retrieval  Long Term Goal 5: improve FMC to independently manipulate fasteners        Therapy Time:   Individual Group Co-Treat   Time In 935       Time Out 1100         Minutes 85             ADL/IADL trainin minutes   Missed 5 minutes due to being with another pt, will attempt makeup as able    Electronically signed by:     HERMAN Ha,   3/8/2023, 10:52 AM

## 2023-03-08 NOTE — PROGRESS NOTES
Progress Note    Date:3/8/2023       [id number]  Patient Name:Garth Aguilera     YOB: 1935     Age:87 y.o. Assessment        Hospital Problems             Last Modified POA    * (Principal) Impaired mobility and activities of daily living dt CP debility 2/26/2023 Yes    Mixed hyperlipidemia 2/26/2023 Yes    Gastritis without bleeding 2/26/2023 Yes    Pneumonia of both lungs due to infectious organism 2/26/2023 Yes    Acute kidney failure ([address] 2/26/2023 Yes    Arthritis of shoulder region, left 3/6/2023 Yes    Spinal stenosis of lumbar region with neurogenic claudication 2/26/2023 Yes    Atherosclerosis of native artery of both lower extremities with intermittent claudication ([address] 2/26/2023 Yes    Atrial fibrillation ([address] 2/26/2023 Yes    Overview Signed 2/2/2021  3:01 PM by EDELMIRA Thorpe - CNP     Past cardioversion         Venous insufficiency 2/26/2023 Yes    Balance disorder 2/26/2023 Yes       Plan:        Impaired mobility and activities of daily living due to CP debility - Dr. Carla Grullon managing. Loculated pleural effusion in the setting of mesothelioma - ID following. Repeat culture of effusion negative. Antibiotics Dc'd by ID on 3/3/2023. Paroxysmal atrial fibrillation - On Coumadin with consult to pharmacy for dosing. Latest INR 3.2. On metoprolol. Essential hypertension, CHF LVEF 58%, CAD, hyperlipidemia - blood pressure mildly elevated, latest blood pressure 140/60. On aspirin, Lasix, metoprolol, and Crestor. Euvolemic on assessment. Arthritis - On lidocaine patch, with PRN oxycodone. Hospital medicine managing acute needs. Patient will need to follow up with PCP for chronic disease management. Time spent evaluating and intervening patient, 35 minutes. Greater than 70% of time spent focused exclusively on this patient, reviewing chart, reconciling medications, and answering questions and discussing treatment plan.     Subjective   Interval History Status: improved. The patient claims that he is doing and feeling good today. Patient denies dizziness, lightheadedness, headache, shortness of breath, chest pain, fatigue, weakness, fever, chills, and N/V/D. Review of Systems   12 point review of systems reviewed with patient with pertinent positive listed in HPI, otherwise, negative. Medications   Scheduled Meds:    acetaminophen  650 mg Oral TID    potassium chloride  10 mEq Oral Daily    Vitamin D  2,000 Units Oral Dinner    cyanocobalamin  1,000 mcg IntraMUSCular Weekly    coenzyme Q10  100 mg Oral Daily    warfarin placeholder: dosing by pharmacy   Other RX Placeholder    aspirin  81 mg Oral Daily    furosemide  20 mg Oral Daily    guaiFENesin  600 mg Oral BID    melatonin  5 mg Oral Nightly    metoprolol tartrate  25 mg Oral BID    naloxegol  25 mg Oral QAM    rosuvastatin  10 mg Oral Daily     Continuous Infusions:   PRN Meds: camphor-menthol-methyl salicylate, guaiFENesin-dextromethorphan, hydrocortisone, lidocaine, polyvinyl alcohol, bisacodyl, sodium phosphate, acetaminophen, ipratropium-albuterol, ondansetron **OR** ondansetron, oxyCODONE **OR** oxyCODONE    Past History    Past Medical History:   has a past medical history of A-fib ([facility] [address] Arthritis, Baker's cyst of knee, left, Cancer ([facility] [address] Cerebral artery occlusion with cerebral infarction ([facility] [address] Congestive heart failure ([facility] [address] Coronary artery disease, HTN (hypertension), Hx of blood clots, Hyperlipidemia, Pacemaker, Polycythemia, and Torn meniscus. Social History:   reports that he quit smoking about 54 years ago. His smoking use included cigarettes. He has a 5.00 pack-year smoking history. He has never used smokeless tobacco. He reports current alcohol use. He reports that he does not use drugs.      Family History:   Family History   Problem Relation Age of Onset    Heart Attack Mother     Other Mother          in MVA    Other Father          at age 80    Other Sister  as infant    Cancer Brother     Other Brother         unknown medical hx    Other Brother          at age 61 due to accident    Cancer Daughter         colon & lung cancer    Colon Cancer Daughter     No Known Problems Son     Colon Cancer Other     Breast Cancer Other        Physical Examination      Vitals:  BP (!) 140/60   Pulse 66   Temp 97.7 °F (36.5 °C) (Oral)   Resp 14   Ht 5' 7\" (1.702 m)   Wt 263 lb 6.4 oz (119.5 kg)   SpO2 98%   BMI 41.25 kg/m²   Temp (24hrs), Av.8 °F (36.6 °C), Min:97.7 °F (36.5 °C), Max:97.9 °F (36.6 °C)      I/O (24Hr): Intake/Output Summary (Last 24 hours) at 3/8/2023 1050  Last data filed at 3/8/2023 0901  Gross per 24 hour   Intake 720 ml   Output --   Net 720 ml     Physical Exam  Constitutional:       General: He is not in acute distress. Appearance: He is obese. He is ill-appearing. HENT:      Head: Normocephalic and atraumatic. Eyes:      Conjunctiva/sclera: Conjunctivae normal.   Cardiovascular:      Rate and Rhythm: Normal rate and regular rhythm. Pulses: Normal pulses. Heart sounds: Normal heart sounds. No murmur heard. No friction rub. No gallop. Pulmonary:      Effort: Pulmonary effort is normal. No respiratory distress. Breath sounds: Normal breath sounds. No wheezing, rhonchi or rales. Abdominal:      General: Bowel sounds are normal. There is distension. Tenderness: There is no abdominal tenderness. Musculoskeletal:      Cervical back: Neck supple. Right lower le+ Edema present. Left lower le+ Edema present. Skin:     General: Skin is warm and dry. Coloration: Skin is not pale. Findings: Erythema present. Comments: Erythema posterior L hand   Neurological:      General: No focal deficit present. Mental Status: He is alert and oriented to person, place, and time. Cranial Nerves: No cranial nerve deficit. Sensory: No sensory deficit. Motor: Weakness present. Psychiatric:         Mood and Affect: Mood normal.         Behavior: Behavior normal.         Thought Content: Thought content normal.         Judgment: Judgment normal.       Labs/Imaging/Diagnostics   Labs:  CBC:  Recent Labs     03/08/23  0450 03/08/23  0721   WBC 10.8  --    RBC 2.43*  --    HGB 6.8* 7.2*   HCT 20.5* 22.3*   MCV 84.2  --    RDW 17.8*  --      --      CHEMISTRIES:  Recent Labs     03/08/23  0450      K 4.5      CO2 21   BUN 27*   CREATININE 1.06   GLUCOSE 122*     PT/INR:  Recent Labs     03/06/23  0416 03/07/23  1401 03/08/23  0450   PROTIME 32.3* 29.8* 32.6*   INR 3.1 2.8 3.2     APTT:No results for input(s): APTT in the last 72 hours. LIVER PROFILE:No results for input(s): AST, ALT, BILIDIR, BILITOT, ALKPHOS in the last 72 hours. Imaging Last 24 Hours:  No results found.     Electronically signed by EDELMIRA Amador CNP on 3/8/23 at 10:50 AM EST

## 2023-03-08 NOTE — PROGRESS NOTES
Warfarin Dosing - Pharmacy Consult Note  Consulting Provider: Renella Primrose, APRN - CNP    Indication:  Atrial Fibrillation  Warfarin Dose prior to admission: 4 mg T/TH/Sat/Sun, 5 mg M/W/F   Concurrent anticoagulants/antiplatelets: asa 59FP  Significant Drug Interactions: No obvious interactions  Recent Labs     03/06/23  0416 03/07/23  1401 03/08/23  0450 03/08/23  0721   INR 3.1 2.8 3.2  --    HGB  --   --  6.8* 7.2*   PLT  --   --  319  --      Recent warfarin administrations                     warfarin (COUMADIN) tablet 1 mg (mg) 1 mg Given 03/07/23 1748    warfarin (COUMADIN) tablet 2 mg (mg) 2 mg Given 03/05/23 1708                   Date      INR       Dose  2/24       1.6        5 mg   2/25       1.9        4 mg   2/26       2.5        2 mg  2/27       2.9        2.5 mg   2/28       2.8         4 mg  3/1         3.2        HOLD  3/2         3.4        HOLD  3/3         3.4        HOLD  3/4         3.0         2 mg  3/5         3.0         2 mg  3/6         3.1        HOLD  3/7         2.8         1 mg  3/8         3.2        HOLD      Assessment/Plan  (Goal INR: 2 - 3)  INR 3.2 is slightly supra therapeutic for goal range. Patient has been trending at 3.0 or higher for 6 of the past 7 days with a TWD of warfarin 3 mg  Will hold warfarin today     Active problem list reviewed. INR orders are placed. Chart reviewed for pertinent labs, drug/diet interactions, and past doses. Documentation of patient's clinical condition was reviewed. Pharmacy Dosing:  Pharmacy will continue to follow.      Axel Mcgregor PharmD  3/8/2023 9:26 AM

## 2023-03-08 NOTE — PROGRESS NOTES
Subjective: The patient complains of moderate to severe acute on chronic progressive fatigue and  PRIETO partially relieved by rest, medications, PT,  OT,   SLP and rest and exacerbated by recent illness  . Patient had initially been treated at [facility] [place] where a nerve stimulator placement was attempted but delayed due to high INR. Once the procedure was finally initiated it was aborted because of possible CSF leak. Patient became shortness of breath postprocedure but this was found to be largely unrelated to the procedure but related to a large loculated pleural effusion. Patient was transferred to [facility] for thoracotomy and VATS procedure performed by Dr. Sangeeta Dinh. As noted patient had a chest tube inserted by Dr. Sangeeta Dinh on 2/18/2023. He has been followed by thoracic surgery pulmonology oncology GI and cardiology as well as the hospitalist.     From a thoracic surgery standpoint cultures were sent from his pleural effusion which were positive and he is now on IV antibiotics. The left a Pleurx tube in O2   drain 3 times a week. The plan is for outpatient removal of the drain and if any worsening symptoms occur after the Pleurx is removed possible VATS surgery. They are trying to avoid surgery for now. Pulmonology followed him advising titrating O2 to keep sats above 90 continue home CPAP and continue draining the Pleurx daily. 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 He they noted his symptoms were improving and he has a history of following with Dr. Millie Jacobo at [facility] in [facility] regarding his mesothelioma. Discussed options regarding possible chemotherapy palliative care intermittent drainage of the Pleurx catheter and possible hospice care. Currently they are advising treating anemia with IV Venofer here.   Cardiology was consulted and obtain an echocardiogram to assess the pericardial effusion    I am concerned about patients medical complexities and barriers to advancing in rehab goals including  improved pain control. He is complaining of flareup in his left shoulder x-rays reviewed. I would suggest heating pad, Lidoderm, pain medication and a left shoulder injection. I reviewed current care and plans for further care with other rehab providers including nursing and case management. According to recent nursing note, \"  2045-HS assessment completed. Vitals stable on RA. Pt denies CP/SOB at this time. Left pleurex dressing clean, dry, intact. Minimal crackles heard in left lower base. Pt denies need for pain medication. Falls precautions in place. Call light in reach. Team to continue to monitor. ..0535- Perfectserve to Dr. Marli Hong with hmg result of 6.8. No obvious signs of bleeding apparent. ..0550-Dr. Antunez to bedside to discuss blood transfusion. Pt declined at this time. \"     I have been concerned about patient's fatigue I checked stat labs and indeed he has significant anemia may likely need transfusion. However recheck shows him above 7 at 7.2 so we will hold off for now. ROS x10: The patient also complains of severely impaired mobility and activities of daily living. Otherwise no new problems with vision, hearing, nose, mouth, throat, dermal, cardiovascular, GI, , pulmonary, musculoskeletal, psychiatric or neurological. See also Acute Rehab PM&R H&P. Vital signs:  BP (!) 140/60   Pulse 66   Temp 97.7 °F (36.5 °C) (Oral)   Resp 14   Ht 5' 7\" (1.702 m)   Wt 263 lb 6.4 oz (119.5 kg)   SpO2 98%   BMI 41.25 kg/m²   I/O:   PO/Intake:  fair PO intake,   Reg diet    Bowel:   continent    Bladder: continent    bishop  General:  Patient is well developed,   adequately nourished, and    well kempt. HEENT:    Pupils equal, hearing intact to loud voice, external inspection of ear and nose benign. Inspection of lips, tongue and gums  thrush    Musculoskeletal: No significant change in strength or tone.   All joints stable. Inspection and palpation of digits and nails show no clubbing, cyanosis or inflammatory conditions. Neuro/Psychiatric: Affect: flat but pleasant. Alert and oriented to person, place and situation with  min cues. No significant change in deep tendon reflexes or sensation  Lungs:  Diminished, CTA-B. Respiration effort is   normal at rest.   Pleurx cath dressing D&I L Side. Heart:   S1 = S2,    irreg a fib. Abdomen:  Soft, non-tender, no enlargement of liver or spleen. Extremities:   mod lower extremity edema    Skin:   Intact to general survey,  Pleurx cath dressing D&I L Side. Rehabilitation:  Physical Therapy:   Bed mobility:  Bed mobility  Rolling to Left: Minimal assistance (02/26/23 1242)  Rolling to Right: Minimal assistance (02/26/23 1242)  Supine to Sit: Moderate assistance (02/26/23 1242)  Sit to Supine: Moderate assistance (02/26/23 1242)  Bed Mobility Comments: HOB flat; cues to avoid breath holding (02/26/23 1242)  Roll Left  Assistance Level: Supervision (03/07/23 1314)  Skilled Clinical Factors: Increased time to complete good technique (03/06/23 1605)  Roll Right  Assistance Level: Supervision (03/07/23 1314)  Skilled Clinical Factors: With use of bed rail (03/02/23 1317)  Sit to Supine  Assistance Level: Supervision (03/07/23 1314)  Skilled Clinical Factors: Increased time to complete (03/06/23 1605)  Supine to Sit  Assistance Level: Supervision (03/07/23 1314)  Skilled Clinical Factors: Cues for breathing throughout transfer (03/06/23 1605)  Scooting  Assistance Level: Supervision (03/07/23 1314)  Transfers:  Transfers  Sit to Stand:  Moderate Assistance (02/26/23 1243)  Stand to Sit: Moderate Assistance (02/26/23 1243)  Comment: poor use of L LE during sit to stand; [facility] used (02/26/23 1243)  Sit to Stand  Assistance Level: Supervision (03/07/23 1314)  Skilled Clinical Factors: cont multiple attempts to stand (03/06/23 9659)  Stand to Sit  Assistance Level: Supervision (03/07/23 1314)  Skilled Clinical Factors: completed safely without vc (03/07/23 1314)  Bed To/From Chair  Technique: Stand step (03/07/23 0919)  Assistance Level: Supervision (03/07/23 1314)  Skilled Clinical Factors: Completes with [employer] (03/07/23 1314)  Car Transfer  Assistance Level: Stand by assist (03/07/23 0919)  Skilled Clinical Factors: Enrico to rise from low level chair able to lift BLE in and out of car (02/27/23 0942)  Gait:   Ambulation  Surface: Level tile (03/02/23 1406)  Device: Rolling Walker (03/02/23 1406)  Other Apparatus: O2 (03/02/23 1406)  Assistance: Contact guard assistance;Stand by assistance (03/02/23 1406)  Quality of Gait: cues for posture and upward gaze, decreased step length and height (03/02/23 1406)  Gait Deviations: Slow Jefe; Increased MENA; Decreased step length;Decreased step height (02/26/23 1244)  Distance: 100' (03/02/23 1406)  Ambulation  Surface: Carpet; Level surface; Uneven surface (03/07/23 1342)  Device: Rolling walker (03/07/23 1342)  Distance: 200', 110' (03/07/23 1342)  Activity: Within Unit (03/07/23 1342)  Activity Comments: Improved heel strike/ pace with shoes donned (03/07/23 1342)  Additional Factors: Verbal cues (03/07/23 1342)  Assistance Level: Stand by assist (03/07/23 1342)  Gait Deviations: Slow jefe;Decreased heel strike left;Decreased step length bilateral (03/07/23 1342)  Skilled Clinical Factors: Increased fatigue with extended distances. Requiring extended seated rest break to decrease SOB SpO2 94% post gait (03/07/23 1342)  Stairs:  Stairs/Curb  Stairs?: No (03/02/23 1406)  Stairs  Stair Height: 6'' (03/07/23 0920)  Device: Bilateral handrails (03/07/23 0920)  Number of Stairs: 4 (03/07/23 0920)  Additional Factors: Verbal cues; Non-reciprocal going down;Reciprocal going up (03/07/23 0920)  Assistance Level: Supervision (03/07/23 0920)  Skilled Clinical Factors: 92% post stairs requires seated rest break due to SOB (03/07/23 0920)  W/C mobility:       Occupational Therapy:   Hand Dominance: Right  ADL  Feeding: Stand by assistance (02/26/23 1201)  Grooming: Minimal assistance (02/26/23 1201)  UE Bathing: Minimal assistance (02/26/23 1201)  LE Bathing: Maximum assistance (02/26/23 1201)  LE Bathing Skilled Clinical Factors: periare only per pt request (02/26/23 1201)  UE Dressing: Moderate assistance (02/26/23 1201)  LE Dressing: Maximum assistance (02/26/23 1201)  Toileting: Maximum assistance (02/26/23 1201)  Additional Comments: sponge bath ADL completed sitting EOB and patially on toilet. (02/26/23 1201)  Toilet Transfers  Toilet - Technique: Ambulating (02/26/23 1207)  Equipment Used: Grab bars (02/26/23 1207)  Toilet Transfer: Moderate assistance (02/26/23 1207)  Toilet Transfers Comments: CGA on ModA off (02/26/23 1207)          Speech Therapy:      Comprehension:  (AUditory comprehension is [facility])  Verbal Expression:  (Supervised assist required for high level naming and reasoning tasks secondary to reduced thought organization)  Diet/Swallow:           Compensatory Swallowing Strategies :  Alternate solids and liquids, Eat/Feed slowly, Upright as possible for all oral intake, Small bites/sips          Lab/X-ray studies reviewed, analyzed and discussed with patient and staff:   Recent Results (from the past 24 hour(s))   Protime-INR    Collection Time: 03/07/23  2:01 PM   Result Value Ref Range    Protime 29.8 (H) 12.3 - 14.9 sec    INR 2.8    CBC    Collection Time: 03/08/23  4:50 AM   Result Value Ref Range    WBC 10.8 4.8 - 10.8 K/uL    RBC 2.43 (L) 4.70 - 6.10 M/uL    Hemoglobin 6.8 (LL) 14.0 - 18.0 g/dL    Hematocrit 20.5 (LL) 42.0 - 52.0 %    MCV 84.2 79.0 - 92.2 fL    MCH 28.1 27.0 - 31.3 pg    MCHC 33.4 33.0 - 37.0 %    RDW 17.8 (H) 11.5 - 14.5 %    Platelets 952 582 - 346 K/uL   Basic Metabolic Panel    Collection Time: 03/08/23  4:50 AM   Result Value Ref Range    Sodium 139 135 - 144 mEq/L    Potassium 4.5 3.4 - 4.9 mEq/L Chloride 104 95 - 107 mEq/L    CO2 21 20 - 31 mEq/L    Anion Gap 14 9 - 15 mEq/L    Glucose 122 (H) 70 - 99 mg/dL    BUN 27 (H) 8 - 23 mg/dL    Creatinine 1.06 0.70 - 1.20 mg/dL    Est, Glom Filt Rate >60.0 >60    Calcium 8.7 8.5 - 9.9 mg/dL   Protime-INR    Collection Time: 03/08/23  4:50 AM   Result Value Ref Range    Protime 32.6 (H) 12.3 - 14.9 sec    INR 3.2        3/5/23-CT  Left shoulder   Advanced osteoarthritis of the glenohumeral joint with intra-articular   ossified bodies. 2.  No acute fracture identified. 3.  Calcific tendinitis versus bursitis of the rotator cuff tendon,   subacromial subdeltoid bursa. XR ABDOMEN   2/18/2023   1. Complete opacification of left hemithorax compatible with some combination of pleural effusion and atelectasis. Secondary obscuration of left heart border. 2.  Nonobstructive bowel gas pattern. No acute abdominal disease identified. 3.  Probable left renal stone. CT CHEST   2/23/2023 : Mediastinum: Thyroid is homogeneous in appearance. Vascular calcifications seen within the coronary vessels. Cardiac chambers are mildly enlarged. Pacer leads in satisfactory position. Moderate-sized pericardial effusion. Bulky adenopathy identified in the left hilar region likely reactive. Small lymph nodes seen scattered throughout the mediastinum likely reactive. Atherosclerotic disease seen diffusely throughout the thoracic aorta. Lungs/pleura: There is small chest tube identified in place on the left with small left pleural effusion. Airspace consolidation seen within the left upper and lower lung fields suggesting superimposed infiltrate such as pneumonia. Mild increased markings identified at the right lung base to suggest atelectatic change. Trace pneumothorax identified anteriorly. Upper Abdomen: Stones in the gallbladder. Small hiatal hernia. Soft Tissues/Bones: Multilevel degenerative changes seen within the spine.  No acute chest wall abnormality. Indwelling chest tube identified on the left with small left pleural effusion and trace anterior pneumothorax. Consolidation seen in airspace disease throughout the left upper and lower lobes to suggest a multifocal superimposed pneumonia. Soft tissue density seen in the hilar region to suggest possible reactive adenopathy. Moderate-sized pericardial effusion. Minimal atelectatic changes seen at the right lung base. Incidental stones in the gallbladder with small hiatal hernia. XR CHEST   2023   1. Minimal partial clearing of the left lung. The previously noted left pleural effusion is smaller   2. Stable position of the left chest tube   3. The right lung is clear. XR CHEST  2023   1. Apparent interval placement of left chest tube catheter. No pneumothorax. 2.  Persistent opacification of the left hemithorax. XR CHEST  2023   1. Near complete whiteout of the left hemithorax likely represent a combination of partial collapse of the left lung and large pleural effusion   2. Cardiomegaly   3. The right lung is grossly clear. US DUP UPPER EXTREMITY RIGHT VENOUS  2023   No evidence of DVT. FLUORO FOR SURGICAL PROCEDURES  2023  12 spot images of the lumbar spine were obtained. Intraprocedural fluoroscopic spot images as above. See separate procedure report for more information. EGD 2023  [id number] [facility] Patient: Dylon Tidwell MRN: [id number] : 1935 Account: Gender: Male Age: 82 Years Procedure: Upper GI endoscopy Date: 2023 Attending Physician: Braxton Fuller Indications:        -  Anemia        -  Melena Medications:        -  Monitored Anesthesia Care Complications:        -  No immediate complications. Estimated Blood Loss:        -  Estimated blood loss: none.  Procedure:        - The Gastroscope was introduced through the mouth and advanced to the second           part of the duodenum.        -  The patient tolerated the procedure well. Findings:        -  A 2 cm hernia was found.        -  Esophagogastric landmarks were identified: the gastroesophageal junction           was found at 36 cm, the lower esophageal sphincter was found at 38 cm and the           upper extent of the gastric folds was found at 36 cm from the incisors. -  Patchy moderate inflammation characterized by erythema was found in the           gastric antrum and in the gastric body. -  The examined duodenum was normal.        -  The cardia and gastric fundus were normal on retroflexion.        - No old or fresh blood noted Impression:        -  2 cm hernia. -  Esophagogastric landmarks identified.        -  Gastritis. -  Normal examined duodenum.        -  No specimens collected. - No old or fresh blood noted Recommendation:        -  Put patient on a clear liquid diet starting today. -  Continue present medications. -     - 40400, Esophagogastroduodenoscopy, flexible, transoral; diagnostic,           including collection of specimen(s) by brushing or washing, when performed           (separate procedure) Diagnosis Code(s):        - D64.9, Anemia, unspecified        - K92.1, Melena (includes Hematochezia)        - K44.9, Diaphragmatic hernia without obstruction or gangrene        - K29.70, Gastritis, unspecified, without bleeding           Previous extensive, complex labs, notes and diagnostics reviewed and analyzed. ALLERGIES:    Allergies as of 02/25/2023 - Fully Reviewed 02/25/2023   Allergen Reaction Noted    Benadryl [diphenhydramine hcl] Anxiety 01/31/2012    Diphenhydramine Anxiety 05/09/2018      (please also verify by checking MAR)       Review and simplify inpatient and outpatient medications and dosing times. Transition to self medication program if able.         Complex Physical Medicine & Rehab Issues Assess & Plan:   Severe abnormality of gait and mobility and impaired self-care and ADL's secondary to progressive cardiopulmonary debility. Functional and medical status reassessed regarding patients ability to participate in therapies and patient found to be able to participate in acute intensive comprehensive inpatient rehabilitation program including PT/OT to improve balance, ambulation, ADLs, and to improve the P/AROM. Therapeutic modifications regarding activities in therapies, place, amount of time per day and intensity of therapy made daily. In bed therapies or bedside therapies prn. Bowel and Bladder dysfunction  , Neurogenic bowel and bladder:  frequent toileting, ambulate to bathroom with assistance, check post void residuals. Check for C.difficile x1 if >2 loose stools in 24 hours, continue bowel & bladder program.  Monitor bowel and bladder function. Lactinex 2 PO every AC. MOM prn, Brown Bomb prn, Glycerin suppository prn, enema prn. Encourage therapy and nursing to co-treat and problem solve re continence. Severe back pain, lumbar, as well as generalized OA pain: reassess pain every shift and prior to and after each therapy session, give prn Tylenol and consider scheduled Tylenol, modalities prn in therapy, masage, Lidoderm, K-pad prn. Consider scheduled AM pain meds. Consult Ortho for left shoulder injection  Skin healing  ears and breakdown risk:  continue pressure relief program.  Daily skin exams and reports from nursing. Fatigue due to nutritional and hydration deficiency: Add and titrate vitamin B12 vitamin D and CoQ10 continue to monitor I&Os, calorie counts prn, dietary consult prn. Add healthy snack at night. Acute episodic insomnia with situational adjustment disorder:  prn Ambien, monitor for day time sedation. Falls risk elevated:  patient to use call light to get nursing assistance to get up, bed and chair alarm. Elevated DVT risk: progressive activities in PT, continue prophylaxis JOB hose, elevation and meds-see MAR.   Complex discharge planning: Discharge March 12, 2023 home with his wife and home health care. Weekly team meeting every Monday to re-assess progress towards goals, discuss and address social, psychological and medical comorbidities and to address difficulties they may be having progressing in therapy. Patient and family education is in progress. The patient is to follow-up with their family physician after discharge. Complex Active General Medical Issues that complicate care Assess & Plan:    Mesothelioma with dry cough-titrate O2, aerosols, follow-up with Dr. Attila Reardon, drain pleural Dex daily versus 3 times a week per protocols from pulmonology and lung surgery consult  Mixed hyperlipidemia,  Atherosclerosis of native artery of both lower extremities with intermittent claudication, Atrial fibrillation, Venous insufficiency-Acute rehab to monitor heart rate and rhythm with the option of telemetry and the effects of chronotropic medication with respect to increasing physical activity and exercise in PT, OT, ADLs with medication titration to lowest effective dosing. Continue blood signs every shift focusing on heart rate, rhythm and blood pressure checks with orthostatic checks-monitoring the effect of exercise, therapy and posture. Consult hospitalist for backup medical and adjust/add medications (aspirin, Lasix, Lopressor, Crestor, Coumadin, bridging Lovenox). Monitor heart rate and blood pressure as well as medications effects on vital signs before during and after therapy with especial focus on preventing orthostasis and falls risk. Recheck CBC and BMP. Gastritis without bleeding-Elevate head of bed after meals, monitor stools for blood, lowest effective dose of PPI, consider Tums. Pneumonia of both lungs due to [facility] for endurance traing with Pulse Ox to monitoring oxygen saturation and heart rate with O2 titration to lowest effective dose.  Pulse oximeter checks to shift and at HS to dose and titrate oxygen and aerosol treatments monitor for nocturnal hypoxemia, monitor vital signs, oxygen prn. Focus on energy conservation. eft a Pleurx tube in O2   drain  Progressive anemia with history of GERD-and chronic anticoagulation for cardiac issues -elevate head of bed after meals, monitor stools for blood, lowest effective dose of PPI, consider Tums. Anticoagulation coumadinization for [id number] pharmacist regarding INR management    Acute kidney failure-Eliminate toxic medications, monitor I's and O's focusing on urine output, recheck BMP. Spinal stenosis of lumbar region with neurogenic claudication    Balance disorder-focus on balance and therapy     Focus on coordinating dc plans with patient family and care givers and order necessary equipment.             Electronically signed by Yohana Suggs DO on 2/27/23 at 8:24 AM WARREN Laughlin D.O., PM&R     Attending    [facility] Anne Mishra

## 2023-03-09 LAB
HCT VFR BLD CALC: 22.7 % (ref 42–52)
HEMOGLOBIN: 7.2 G/DL (ref 14–18)
INR BLD: 3
MCH RBC QN AUTO: 26.8 PG (ref 27–31.3)
MCHC RBC AUTO-ENTMCNC: 31.8 % (ref 33–37)
MCV RBC AUTO: 84.3 FL (ref 79–92.2)
PDW BLD-RTO: 18 % (ref 11.5–14.5)
PLATELET # BLD: 368 K/UL (ref 130–400)
PROTHROMBIN TIME: 31.1 SEC (ref 12.3–14.9)
RBC # BLD: 2.69 M/UL (ref 4.7–6.1)
WBC # BLD: 8.1 K/UL (ref 4.8–10.8)

## 2023-03-09 PROCEDURE — 97530 THERAPEUTIC ACTIVITIES: CPT

## 2023-03-09 PROCEDURE — 97112 NEUROMUSCULAR REEDUCATION: CPT

## 2023-03-09 PROCEDURE — 97116 GAIT TRAINING THERAPY: CPT

## 2023-03-09 PROCEDURE — 36415 COLL VENOUS BLD VENIPUNCTURE: CPT

## 2023-03-09 PROCEDURE — 6370000000 HC RX 637 (ALT 250 FOR IP): Performed by: FAMILY MEDICINE

## 2023-03-09 PROCEDURE — 97130 THER IVNTJ EA ADDL 15 MIN: CPT

## 2023-03-09 PROCEDURE — 6370000000 HC RX 637 (ALT 250 FOR IP): Performed by: PHYSICAL MEDICINE & REHABILITATION

## 2023-03-09 PROCEDURE — 97535 SELF CARE MNGMENT TRAINING: CPT

## 2023-03-09 PROCEDURE — 97129 THER IVNTJ 1ST 15 MIN: CPT

## 2023-03-09 PROCEDURE — 85027 COMPLETE CBC AUTOMATED: CPT

## 2023-03-09 PROCEDURE — 6370000000 HC RX 637 (ALT 250 FOR IP): Performed by: REGISTERED NURSE

## 2023-03-09 PROCEDURE — 99232 SBSQ HOSP IP/OBS MODERATE 35: CPT | Performed by: PHYSICAL MEDICINE & REHABILITATION

## 2023-03-09 PROCEDURE — 85610 PROTHROMBIN TIME: CPT

## 2023-03-09 PROCEDURE — 1180000000 HC REHAB R&B

## 2023-03-09 PROCEDURE — 97110 THERAPEUTIC EXERCISES: CPT

## 2023-03-09 RX ADMIN — ACETAMINOPHEN 650 MG: 325 TABLET ORAL at 19:51

## 2023-03-09 RX ADMIN — ACETAMINOPHEN 650 MG: 325 TABLET ORAL at 09:29

## 2023-03-09 RX ADMIN — METOPROLOL TARTRATE 25 MG: 25 TABLET, FILM COATED ORAL at 19:51

## 2023-03-09 RX ADMIN — ROSUVASTATIN CALCIUM 10 MG: 5 TABLET, FILM COATED ORAL at 09:28

## 2023-03-09 RX ADMIN — Medication 2000 UNITS: at 16:32

## 2023-03-09 RX ADMIN — Medication 100 MG: at 09:29

## 2023-03-09 RX ADMIN — Medication 5 MG: at 19:52

## 2023-03-09 RX ADMIN — NALOXEGOL OXALATE 25 MG: 12.5 TABLET, FILM COATED ORAL at 09:29

## 2023-03-09 RX ADMIN — GUAIFENESIN 600 MG: 600 TABLET ORAL at 09:29

## 2023-03-09 RX ADMIN — POTASSIUM CHLORIDE 10 MEQ: 750 CAPSULE, EXTENDED RELEASE ORAL at 09:29

## 2023-03-09 RX ADMIN — FUROSEMIDE 20 MG: 20 TABLET ORAL at 09:28

## 2023-03-09 RX ADMIN — ASPIRIN 81 MG: 81 TABLET, COATED ORAL at 09:30

## 2023-03-09 RX ADMIN — METOPROLOL TARTRATE 25 MG: 25 TABLET, FILM COATED ORAL at 09:28

## 2023-03-09 RX ADMIN — ACETAMINOPHEN 650 MG: 325 TABLET ORAL at 16:32

## 2023-03-09 RX ADMIN — GUAIFENESIN 600 MG: 600 TABLET ORAL at 19:51

## 2023-03-09 ASSESSMENT — PAIN SCALES - GENERAL
PAINLEVEL_OUTOF10: 0
PAINLEVEL_OUTOF10: 3
PAINLEVEL_OUTOF10: 4

## 2023-03-09 ASSESSMENT — PAIN DESCRIPTION - PAIN TYPE: TYPE: CHRONIC PAIN

## 2023-03-09 ASSESSMENT — PAIN DESCRIPTION - ORIENTATION
ORIENTATION: LEFT
ORIENTATION: LEFT

## 2023-03-09 ASSESSMENT — PAIN DESCRIPTION - DESCRIPTORS: DESCRIPTORS: DISCOMFORT

## 2023-03-09 ASSESSMENT — PAIN DESCRIPTION - LOCATION
LOCATION: SHOULDER
LOCATION: SHOULDER

## 2023-03-09 NOTE — PROGRESS NOTES
Physical Therapy Rehab Treatment Note  Facility/Department: [facility]  Room: [place] [address]       NAME: Luis Stubbs  : 1935 (82 y.o.)  MRN: [id number]  CODE STATUS: Full Code    Date of Service: 3/9/2023     Restrictions:  Restrictions/Precautions: Fall Risk  Position Activity Restriction  Other position/activity restrictions: pleurx on L side    SUBJECTIVE:   Subjective: \"I am doing good today\"    Pain  Pain: Denies pain pre and post session    OBJECTIVE:     Sit to Stand  Assistance Level: Modified independent  Stand to Sit  Assistance Level: Modified independent  Bed To/From Chair  Technique: Stand step  Assistance Level: Modified independent    Ambulation  Surface: Carpet; Level surface; Uneven surface  Device: Rolling walker  Distance: 50'  Activity: Within Unit  Activity Comments: Good safety reciprocal pattern  Assistance Level: Independent  Gait Deviations: Slow jefe;Decreased heel strike left;Decreased step length bilateral  Skilled Clinical Factors: Independent and good safety with short distance gait    Stairs  Stair Height: 6''  Device: One handrail  Number of Stairs: 4  Additional Factors: Verbal cues; Non-reciprocal going down;Reciprocal going up  Assistance Level: Supervision  Skilled Clinical Factors: Good safety    PT Exercises  A/AROM Exercises: Seated abdominal bracing x 10 Seated trunk extension into physioball x 10 seateaed rows RTB x 20     ASSESSMENT/PROGRESS TOWARDS GOALS:   Assessment  Assessment: Patient shows good safety with with short household distances and improved safety with stair negotiation.  Maintains 96% SpO2 with gait and stair negotiation on room air  Activity Tolerance: Patient tolerated treatment well  Discharge Recommendations: Continue to assess pending progress    Goals:  Long Term Goals  Long Term Goal 1: indep bed mobility  Long Term Goal 2: Pt will demonstrate transfers indep with safest AD  Long Term Goal 3: Pt will demonstrate amb >/= 150ft supervision with safest AD - indep for 30 feet  Long Term Goal 4: Pt will demonstrate stair negotiation 3 steps without rails with safest AD SBA  Long Term Goal 5: Pt will demonstrate pantoja balance assessment >/= 45/56 for decreased risk for falls. Patient Goals   Patient Goals : Randolph Jennifer out of chairs easier. drive a car.  stand for longer time. \"    PLAN OF CARE/Safety:   Safety Devices  Type of Devices: All fall risk precautions in place; Chair alarm in place      Therapy Time:   Individual   Time In    Time Out 1130   Minutes 30     Minutes: 30  Transfer/Bed mobility trainin  Gait training: 15  Therapeutic ex: COLE Wilkins, 23 at 12:24 PM

## 2023-03-09 NOTE — PROGRESS NOTES
OCCUPATIONAL THERAPY  INPATIENT REHAB TREATMENT NOTE  [address]      NAME: Yelena Whitaker  : 1935 (82 y.o.)  MRN: [id number]  CODE STATUS: Full Code  Room: R210/K584-95    Date of Service: 3/9/2023    Referring Physician: Dr. Hilda Caballero Diagnosis: Impaired mobility and ADL's due to severe pulmonary debility    Restrictions  Restrictions/Precautions  Restrictions/Precautions: Fall Risk              Patient's date of birth confirmed: Yes    SAFETY:       SUBJECTIVE:       Pain at start of treatment: No    Pain at end of treatment: No    =    COGNITION:             OBJECTIVE:     Spoke with pt and Joanna Orellana about rescheduling training with wife  Pt felt she did not need to come in, but wife did ask for training on how to bag up pt pleurex site for showering. Pt was encouraged to allow wife to make the decision d/t being the one to assist pt at home. Pt seemed reluctant but okayed this. LSW encouraged to call wife by therapist, and have wife come in this afternoon for training on covering site per wife request, and then other training or questions could be further addressed. Pt was okay with this. Provided pt with kitchen mobility item/retrieval task with w/w with Sup initially. Pt completed kitchen mobility with w/w but transitioning to using counter top for support. Provided did not need instruction on walker/body positioning during item retrieval. Pt was able to complete retrieval from overhead cupboards, drawers from waist to lower leg but above the ankle, and moving items into/out of fridge/freezer, and microwave. Patient was able to complete the task with Mod Ind, pt had no LOB. Pt completed all tasks to improve safety and independence with mobility to complete functional ADL/IADL tasks. Pt completed completed horiz dowel tanvi tree in standing with Sup. Pt using unilateral support demo'ing F- balance. Pt placed 10 rings on lower 3 rods and 8 on one top tanvi and 7 on the other. All rods are at different heights. Pt stood x 6:40 and demonstrating fatigue with slight shortness of breath. Pt sat post donning rings. Pt demo improvement with LUE reaching with the ability to reach the top dowel,(about 90 degrees),and stated he did not have \"pain\" when asked. Pt completed task to improve with functional standing tolerance, act tolerance, and balance to improve pt independence with functional ADL tasks. Pt stated he would like to go home early d/t possibility of bad weather. Pt also concerned for wife being home alone and the drive getting snowed in. Pt states he would not be there to operate the tractor. Explained to pt, he would not be able operate the tractor at this point and to allow his children to assist if needed. Pt then also expressed concerns of standing tolerance and legs not \"being as good\" as he wished they would be. Pt advised to stay for therapies to maximize his potential with all aspects of strength, act tolerance, balance and ADL skills. Pt stated he felt a spinal stimulator would help his legs and he would like one of these, as he had an experimental one on and it did help. Pt encouraged to maximize standing/leg potential before going home and that way if he continued to want one of these, his surgeon would see he had been in therapy and has maximized his potential.    Pt was thankful for the information provided.         Education: Educated pt on importance of maximizing therapy        ASSESSMENT:  Assessment: Pt is progressively doing better with standing tolerance, balance, and strength  Activity Tolerance: Patient tolerated treatment well      PLAN OF CARE:  Balance training, Functional mobility training, Strengthening, ROM, Endurance training, Safety education & training, Patient/Caregiver education & training, Equipment evaluation, education, & procurement, Home management training, Self-Care / ADL, Coordination training  Continue OT POC until discharge    Patient goals : to get stronger and possibly return to driving  Time Frame for Long Term Goals : within 1.5-2wks pt will demosntrate progress in the following areas to achieve specific LTG's listed in the initial eval  Long Term Goal 1: improve overall strength/endurance for functional tasks. Long Term Goal 2: improve independence with self care  Long Term Goal 3: improve sitting/standing balance for ADL tasks  Long Term Goal 4: improve functional mobility with LRD for safe item transport/retrieval  Long Term Goal 5: improve FMC to independently manipulate fasteners        Therapy Time:   Individual Group Co-Treat   Time In 1030       Time Out 1100         Minutes 30             ADL/IADL training: 10 minutes  Therapeutic activities: 20 minutes     Electronically signed by:     HERMAN Fernandez,   3/9/2023, 11:45 AM

## 2023-03-09 NOTE — CARE COORDINATION
DAVID was notified by Александр Ren (OT) that pt did not have family training on 3/8 dt wife having a procedure. LSW called Makenziekaitxiomara Virgil (wife) via cell and was unable to leave a [facility] box being full. LSW then called Ashleigh via her home phone and left a voicemail requesting a return call to discuss re-scheduling family training if wanted. Ashleigh and dtr were in yesterday evening and completed teaching with Tamanna Murphy (RN) with pt's Pleurx draining. Electronically signed by ANALY Beckham, DAVID on 3/9/2023 at 11:31 AM      LSW spoke with Fernanda Rivera (PT) and he stated that training was not needed. Александр Ren (OT) stated that she spoke to pt's wife yesterday and that the wife's only concern was learning how to cover pt's pleurx while bathing. LSW spoke with Karen Herman (pt's wife) and she stated that she would rather not come in tomorrow due to the snow.  CHERW scheduled training specifically for covering the pleurx for bathing on Manny 3/12 at 1:30PM. Electronically signed by ANALY Beckham LSW on 3/9/2023 at 3:27 PM

## 2023-03-09 NOTE — PROGRESS NOTES
OCCUPATIONAL THERAPY  INPATIENT REHAB TREATMENT NOTE  [address]      NAME: Bhavik Dietrich  : 1935 (82 y.o.)  MRN: [id number]  CODE STATUS: Full Code  Room: B219/C120-71    Date of Service: 3/9/2023    Referring Physician: Dr. Don Nascimento Diagnosis: Impaired mobility and ADL's due to severe pulmonary debility    Restrictions  Restrictions/Precautions  Restrictions/Precautions: Fall Risk         Position Activity Restriction  Other position/activity restrictions: pleurx on L side    Patient's date of birth confirmed: Yes    SAFETY:  Safety Devices  Safety Devices in place: Yes  Type of devices: All fall risk precautions in place    SUBJECTIVE:  Subjective: \"right. \"    Pain at start of treatment: Yes: 3/10    Pain at end of treatment: No    Location: L shoulder  Nursing notified: No  Intervention: Cold applied    COGNITION:  Orientation  Overall Orientation Status: Within Normal Limits  Orientation Level: Oriented X4  Cognition  Overall Cognitive Status: Exceptions          OBJECTIVE:     Table top towel ex completed with BUE with Sup. Pt completed all exercises with only demo'ing increased pain with circles and alphabet exercises. Pt completed all ex 10x1 set. Pt completed task to increase functional ROM and strength to continue improving BUE function for increasing ease and Ind with functional ADL tasks       Pt completed standing dowel/ activity x 3:45 with Sup. Pt. LUE started hurting demo'ing compensation of movement to complete raising arm for task by bringing arm out wide in a abduction fashion to raise it to complete task. Pt. donned R three dowels with washers with R hand and the left 3 with the L hand.   F- standing balance demonstrated during activity with unilateral support demo'd by Pt completed task to increase functional act tolerance, standing act tolerance/balance, and continue improving BUE function/movement to continue increasing pt ability to complete ADL tasks upon returning home. Applied ice to pt L shoulder x 10 min during peg board activity, with good steve test post treatment. Provided pt with peg board task using RUE for pegs from top of board to the last 3 rows closest to the body. Pt then used R hand to place right 5 pegs into board then L hand for the L 5 pegs for the lower 3 rows. Pt completed task demo'ing difficulty with reaching and horiz add movements with LUE d/t pain. Pt completed task to increase functional movement, act tolerance and ROM via BUE to increase pt ability to complete ADL/IADL tasks. Pt completed STS from w/c with Sup. Pt used w/w to complete functional mobility in room to go to closet to find out how many pairs of underwear he had left d/t wife possibly not coming back in until Sunday. Pt miscounted by 3. Pt counted 3 but had 6, so pt felt okay. Pt completed EOB to supine with Sup, demo min difficulty but was okay. Pt short of breath taking 02 starting at 86% and VT- 42, but when leaving pt 02-96% and VT-62. Pt required extended time to recovery and bring rates back up. Pt completed task to improve mobility/transfer for ADL tasks. ASSESSMENT:  Assessment: Pt continues to do well with LUE post pain injection. Pt  demo'ing increased movement and ROM, but still balancing activity and movmeent. If pt does d/t too much activity, it causes aggrevation to the shoulder and increases pain.   Activity Tolerance: Patient tolerated treatment well      PLAN OF CARE:  Balance training, Functional mobility training, Strengthening, ROM, Endurance training, Safety education & training, Patient/Caregiver education & training, Equipment evaluation, education, & procurement, Home management training, Self-Care / ADL, Coordination training  Continue OT POC until discharge    Patient goals : to get stronger and possibly return to driving  Time Frame for Long Term Goals : within 1.5-2wks pt will demosntrate progress in the following areas to achieve specific LTG's listed in the initial eval  Long Term Goal 1: improve overall strength/endurance for functional tasks. Long Term Goal 2: improve independence with self care  Long Term Goal 3: improve sitting/standing balance for ADL tasks  Long Term Goal 4: improve functional mobility with LRD for safe item transport/retrieval  Long Term Goal 5: improve FMC to independently manipulate fasteners        Therapy Time:   Individual Group Co-Treat   Time In 1430       Time Out 1530         Minutes 60                 ADL/IADL training: 15 minutes  Neuromuscular reeducation: 15 minutes  Therapeutic activities: 30 minutes     Electronically signed by:     HERMAN Burton,   3/9/2023, 3:07 PM

## 2023-03-09 NOTE — PROGRESS NOTES
Subjective: The patient complains of moderate to severe acute on chronic progressive fatigue and  PRIETO partially relieved by rest, medications, PT,  OT,   SLP and rest and exacerbated by recent illness  . Patient had initially been treated at [facility] [place] where a nerve stimulator placement was attempted but delayed due to high INR. Once the procedure was finally initiated it was aborted because of possible CSF leak. Patient became shortness of breath postprocedure but this was found to be largely unrelated to the procedure but related to a large loculated pleural effusion. Patient was transferred to [facility] for thoracotomy and VATS procedure performed by Dr. Ace Alexander. As noted patient had a chest tube inserted by Dr. Ace Alexander on 2/18/2023. He has been followed by thoracic surgery pulmonology oncology GI and cardiology as well as the hospitalist.     From a thoracic surgery standpoint cultures were sent from his pleural effusion which were positive and he is now on IV antibiotics. The left a Pleurx tube in O2   drain 3 times a week. The plan is for outpatient removal of the drain and if any worsening symptoms occur after the Pleurx is removed possible VATS surgery. They are trying to avoid surgery for now. Pulmonology followed him advising titrating O2 to keep sats above 90 continue home CPAP and continue draining the Pleurx daily. 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 He they noted his symptoms were improving and he has a history of following with Dr. Yeison Maher at [facility] regarding his mesothelioma. Discussed options regarding possible chemotherapy palliative care intermittent drainage of the Pleurx catheter and possible hospice care. Currently they are advising treating anemia with IV Venofer here.   Cardiology was consulted and obtain an echocardiogram to assess the pericardial effusion    I am concerned about patients medical complexities and barriers to advancing in rehab goals including  improved pain control. He is complaining of flareup in his left shoulder x-rays reviewed. I would suggest heating pad, Lidoderm, pain medication and a left shoulder injection. I reviewed current care and plans for further care with other rehab providers including nursing and case management. According to recent nursing note, no recent substantive nursing notes however I reviewed his case in depth with nursing especially regarding his anemia. I have been concerned about patient's fatigue I checked stat labs and indeed he has significant anemia may likely need transfusion. However recheck shows him above 7 at 7.2 so we will hold off for now. ROS x10: The patient also complains of severely impaired mobility and activities of daily living. Otherwise no new problems with vision, hearing, nose, mouth, throat, dermal, cardiovascular, GI, , pulmonary, musculoskeletal, psychiatric or neurological. See also Acute Rehab PM&R H&P. Vital signs:  /61   Pulse 65   Temp 97.7 °F (36.5 °C) (Oral)   Resp 17   Ht 5' 7\" (1.702 m)   Wt 263 lb 6.4 oz (119.5 kg)   SpO2 100%   BMI 41.25 kg/m²   I/O:   PO/Intake:  fair PO intake,   Reg diet    Bowel:   continent    Bladder: continent    bishop  General:  Patient is well developed,   adequately nourished, and    well kempt. HEENT:    Pupils equal, hearing intact to loud voice, external inspection of ear and nose benign. Inspection of lips, tongue and gums  thrush    Musculoskeletal: No significant change in strength or tone. All joints stable. Inspection and palpation of digits and nails show no clubbing, cyanosis or inflammatory conditions. Neuro/Psychiatric: Affect: flat but pleasant. Alert and oriented to person, place and situation with  min cues. No significant change in deep tendon reflexes or sensation  Lungs:  Diminished, CTA-B.  Respiration effort is   normal at rest.   Pleurx cath dressing D&I L Side. Heart:   S1 = S2,    irreg a fib. Abdomen:  Soft, non-tender, no enlargement of liver or spleen. Extremities:   mod lower extremity edema    Skin:   Intact to general survey,  Pleurx cath dressing D&I L Side. Rehabilitation:  Physical Therapy:   Bed mobility:  Bed mobility  Rolling to Left: Minimal assistance (02/26/23 1242)  Rolling to Right: Minimal assistance (02/26/23 1242)  Supine to Sit: Moderate assistance (02/26/23 1242)  Sit to Supine: Moderate assistance (02/26/23 1242)  Bed Mobility Comments: HOB flat; cues to avoid breath holding (02/26/23 1242)  Roll Left  Assistance Level: Modified independent (03/08/23 1613)  Skilled Clinical Factors: HOB flat with use of bed rail (03/08/23 1613)  Roll Right  Assistance Level: Modified independent (03/08/23 1613)  Skilled Clinical Factors: With use of bed rail (03/08/23 1613)  Sit to Supine  Assistance Level: Modified independent (03/08/23 1613)  Skilled Clinical Factors: Increased time to complete (03/06/23 1605)  Supine to Sit  Assistance Level: Modified independent (03/08/23 1613)  Skilled Clinical Factors: Cues for breathing throughout transfer (03/06/23 1605)  Scooting  Assistance Level: Modified independent (03/08/23 1613)  Transfers:  Transfers  Sit to Stand:  Moderate Assistance (02/26/23 1243)  Stand to Sit: Moderate Assistance (02/26/23 1243)  Comment: poor use of L LE during sit to stand; [employer] used (02/26/23 1243)  Sit to Stand  Assistance Level: Modified independent (03/09/23 1219)  Skilled Clinical Factors: Completes of firsst try good technique (03/08/23 1125)  Stand to Sit  Assistance Level: Modified independent (03/09/23 1219)  Skilled Clinical Factors: completed safely without vc (03/08/23 1125)  Bed To/From Chair  Technique: Stand step (03/09/23 1219)  Assistance Level: Modified independent (03/09/23 1219)  Skilled Clinical Factors: Completes with [employer] (03/08/23 1613)  Car Transfer  Assistance Level: Stand by assist (03/07/23 0919)  Skilled Clinical Factors: Enrico to rise from low level chair able to lift BLE in and out of car (02/27/23 0942)  Gait:   Ambulation  Surface: Level tile (03/02/23 1406)  Device: Link [place] (03/02/23 1406)  Other Apparatus: O2 (03/02/23 1406)  Assistance: Contact guard assistance;Stand by assistance (03/02/23 1406)  Quality of Gait: cues for posture and upward gaze, decreased step length and height (03/02/23 1406)  Gait Deviations: Slow Jefe; Increased MENA; Decreased step length;Decreased step height (02/26/23 1244)  Distance: 100' (03/02/23 1406)  Ambulation  Surface: Carpet; Level surface; Uneven surface (03/08/23 1118)  Device: Rolling walker (03/08/23 1118)  Distance: 200' (03/08/23 1118)  Activity: Within Unit (03/08/23 1118)  Activity Comments: Improved heel strike/ pace with shoes donned (03/08/23 1118)  Additional Factors: Verbal cues (03/08/23 1118)  Assistance Level: Stand by assist (03/08/23 1118)  Gait Deviations: Slow jefe;Decreased heel strike left;Decreased step length bilateral (03/08/23 1118)  Skilled Clinical Factors: Decreased fatigue this session requires decreased seated rest break Spo2 95% post gait (03/08/23 1118)  Stairs:  Stairs/Curb  Stairs?: No (03/02/23 1406)  Stairs  Stair Height: 6'' (03/08/23 1118)  Device: Bilateral handrails (03/08/23 1118)  Number of Stairs: 4 (03/08/23 1118)  Additional Factors: Verbal cues; Non-reciprocal going down;Reciprocal going up (03/08/23 1118)  Assistance Level: Supervision (03/08/23 1118)  Skilled Clinical Factors: 92% post stairs requires seated rest break due to SOB (03/07/23 0920)  W/C mobility:       Occupational Therapy:   Hand Dominance: Right  ADL  Feeding: Stand by assistance (02/26/23 1201)  Grooming: Minimal assistance (02/26/23 1201)  UE Bathing: Minimal assistance (02/26/23 1201)  LE Bathing: Maximum assistance (02/26/23 1201)  LE Bathing Skilled Clinical Factors: periare only per pt request (02/26/23 1201)  UE Dressing: Moderate assistance (02/26/23 1201)  LE Dressing: Maximum assistance (02/26/23 1201)  Toileting: Maximum assistance (02/26/23 1201)  Additional Comments: sponge bath ADL completed sitting EOB and patially on toilet. (02/26/23 1201)  Toilet Transfers  Toilet - Technique: Ambulating (02/26/23 1207)  Equipment Used: Grab bars (02/26/23 1207)  Toilet Transfer: Moderate assistance (02/26/23 1207)  Toilet Transfers Comments: CGA on ModA off (02/26/23 1207)          Speech Therapy:      Comprehension:  (AUditory comprehension is [facility])  Verbal Expression:  (Supervised assist required for high level naming and reasoning tasks secondary to reduced thought organization)  Diet/Swallow:           Compensatory Swallowing Strategies : Alternate solids and liquids, Eat/Feed slowly, Upright as possible for all oral intake, Small bites/sips          Lab/X-ray studies reviewed, analyzed and discussed with patient and staff:   Recent Results (from the past 24 hour(s))   Protime-INR    Collection Time: 03/09/23  3:59 AM   Result Value Ref Range    Protime 31.1 (H) 12.3 - 14.9 sec    INR 3.0    CBC    Collection Time: 03/09/23  3:59 AM   Result Value Ref Range    WBC 8.1 4.8 - 10.8 K/uL    RBC 2.69 (L) 4.70 - 6.10 M/uL    Hemoglobin 7.2 (L) 14.0 - 18.0 g/dL    Hematocrit 22.7 (L) 42.0 - 52.0 %    MCV 84.3 79.0 - 92.2 fL    MCH 26.8 (L) 27.0 - 31.3 pg    MCHC 31.8 (L) 33.0 - 37.0 %    RDW 18.0 (H) 11.5 - 14.5 %    Platelets 690 636 - 822 K/uL       3/5/23-CT  Left shoulder   Advanced osteoarthritis of the glenohumeral joint with intra-articular   ossified bodies. 2.  No acute fracture identified. 3.  Calcific tendinitis versus bursitis of the rotator cuff tendon,   subacromial subdeltoid bursa. XR ABDOMEN   2/18/2023   1. Complete opacification of left hemithorax compatible with some combination of pleural effusion and atelectasis. Secondary obscuration of left heart border.    2.  Nonobstructive bowel gas pattern. No acute abdominal disease identified. 3.  Probable left renal stone. CT CHEST   2/23/2023 : Mediastinum: Thyroid is homogeneous in appearance. Vascular calcifications seen within the coronary vessels. Cardiac chambers are mildly enlarged. Pacer leads in satisfactory position. Moderate-sized pericardial effusion. Bulky adenopathy identified in the left hilar region likely reactive. Small lymph nodes seen scattered throughout the mediastinum likely reactive. Atherosclerotic disease seen diffusely throughout the thoracic aorta. Lungs/pleura: There is small chest tube identified in place on the left with small left pleural effusion. Airspace consolidation seen within the left upper and lower lung fields suggesting superimposed infiltrate such as pneumonia. Mild increased markings identified at the right lung base to suggest atelectatic change. Trace pneumothorax identified anteriorly. Upper Abdomen: Stones in the gallbladder. Small hiatal hernia. Soft Tissues/Bones: Multilevel degenerative changes seen within the spine. No acute chest wall abnormality. Indwelling chest tube identified on the left with small left pleural effusion and trace anterior pneumothorax. Consolidation seen in airspace disease throughout the left upper and lower lobes to suggest a multifocal superimposed pneumonia. Soft tissue density seen in the hilar region to suggest possible reactive adenopathy. Moderate-sized pericardial effusion. Minimal atelectatic changes seen at the right lung base. Incidental stones in the gallbladder with small hiatal hernia. XR CHEST   2/19/2023   1. Minimal partial clearing of the left lung. The previously noted left pleural effusion is smaller   2. Stable position of the left chest tube   3. The right lung is clear. XR CHEST  2/18/2023   1. Apparent interval placement of left chest tube catheter. No pneumothorax.    2.  Persistent opacification of the left hemithorax. XR CHEST  2023   1. Near complete whiteout of the left hemithorax likely represent a combination of partial collapse of the left lung and large pleural effusion   2. Cardiomegaly   3. The right lung is grossly clear. US DUP UPPER EXTREMITY RIGHT VENOUS  2023   No evidence of DVT. FLUORO FOR SURGICAL PROCEDURES  2023  12 spot images of the lumbar spine were obtained. Intraprocedural fluoroscopic spot images as above. See separate procedure report for more information. EGD 2023  [id number] [facility] Patient: Ranjana Orellana MRN: [id number] : 1935 Account: Gender: Male Age: 82 Years Procedure: Upper GI endoscopy Date: 2023 Attending Physician: Jordin Gimenez Indications:        -  Anemia        -  Melena Medications:        -  Monitored Anesthesia Care Complications:        -  No immediate complications. Estimated Blood Loss:        -  Estimated blood loss: none. Procedure:        - The Gastroscope was introduced through the mouth and advanced to the second           part of the duodenum.        -  The patient tolerated the procedure well. Findings:        -  A 2 cm hernia was found.        -  Esophagogastric landmarks were identified: the gastroesophageal junction           was found at 36 cm, the lower esophageal sphincter was found at 38 cm and the           upper extent of the gastric folds was found at 36 cm from the incisors. -  Patchy moderate inflammation characterized by erythema was found in the           gastric antrum and in the gastric body. -  The examined duodenum was normal.        -  The cardia and gastric fundus were normal on retroflexion.        - No old or fresh blood noted Impression:        -  2 cm hernia. -  Esophagogastric landmarks identified.        -  Gastritis. -  Normal examined duodenum.        -  No specimens collected.         - No old or fresh blood noted Recommendation:        -  Put patient on a clear liquid diet starting today. -  Continue present medications. -     - [address]1975, Esophagogastroduodenoscopy, flexible, transoral; diagnostic,           including collection of specimen(s) by brushing or washing, when performed           (separate procedure) Diagnosis Code(s):        - D64.9, Anemia, unspecified        - K92.1, Melena (includes Hematochezia)        - K44.9, Diaphragmatic hernia without obstruction or gangrene        - K29.70, Gastritis, unspecified, without bleeding           Previous extensive, complex labs, notes and diagnostics reviewed and analyzed. ALLERGIES:    Allergies as of 02/25/2023 - Fully Reviewed 02/25/2023   Allergen Reaction Noted    Benadryl [diphenhydramine hcl] Anxiety 01/31/2012    Diphenhydramine Anxiety 05/09/2018      (please also verify by checking [facility])      Complex Physical Medicine & Rehab Issues Assess & Plan:   Severe abnormality of gait and mobility and impaired self-care and ADL's secondary to progressive cardiopulmonary debility. Functional and medical status reassessed regarding patients ability to participate in therapies and patient found to be able to participate in acute intensive comprehensive inpatient rehabilitation program including PT/OT to improve balance, ambulation, ADLs, and to improve the P/AROM. Therapeutic modifications regarding activities in therapies, place, amount of time per day and intensity of therapy made daily. In bed therapies or bedside therapies prn. Bowel and Bladder dysfunction  , Neurogenic bowel and bladder:  frequent toileting, ambulate to bathroom with assistance, check post void residuals. Check for C.difficile x1 if >2 loose stools in 24 hours, continue bowel & bladder program.  Monitor bowel and bladder function. Lactinex 2 PO every AC. MOM prn, Brown Bomb prn, Glycerin suppository prn, enema prn. Encourage therapy and nursing to co-treat and problem solve re continence.     Severe back pain, lumbar, as well as generalized OA pain: reassess pain every shift and prior to and after each therapy session, give prn Tylenol and consider scheduled Tylenol, modalities prn in therapy, masage, Lidoderm, K-pad prn. Consider scheduled AM pain meds. Consult Ortho for left shoulder injection  Skin healing  ears and breakdown risk:  continue pressure relief program.  Daily skin exams and reports from nursing. Fatigue due to nutritional and hydration deficiency: Add and titrate vitamin B12 vitamin D and CoQ10 continue to monitor I&Os, calorie counts prn, dietary consult prn. Add healthy snack at night. Acute episodic insomnia with situational adjustment disorder:  prn Ambien, monitor for day time sedation. Falls risk elevated:  patient to use call light to get nursing assistance to get up, bed and chair alarm. Elevated DVT risk: progressive activities in PT, continue prophylaxis JOB hose, elevation and meds-see MAR. Complex discharge planning: Discharge March 12, 2023 home with his wife and home health care. Weekly team meeting every Monday to re-assess progress towards goals, discuss and address social, psychological and medical comorbidities and to address difficulties they may be having progressing in therapy. Patient and family education is in progress. The patient is to follow-up with their family physician after discharge.         Complex Active General Medical Issues that complicate care Assess & Plan:    Mesothelioma with dry cough-titrate O2, aerosols, follow-up with Dr. Eual Schirmer, drain pleural Dex daily versus 3 times a week per protocols from pulmonology and lung surgery consult  Mixed hyperlipidemia,  Atherosclerosis of native artery of both lower extremities with intermittent claudication, Atrial fibrillation, Venous insufficiency-Acute rehab to monitor heart rate and rhythm with the option of telemetry and the effects of chronotropic medication with respect to increasing physical activity and exercise in PT, OT, ADLs with medication titration to lowest effective dosing. Continue blood signs every shift focusing on heart rate, rhythm and blood pressure checks with orthostatic checks-monitoring the effect of exercise, therapy and posture. Consult hospitalist for backup medical and adjust/add medications (aspirin, Lasix, Lopressor, Crestor, Coumadin, bridging Lovenox). Monitor heart rate and blood pressure as well as medications effects on vital signs before during and after therapy with especial focus on preventing orthostasis and falls risk. Recheck CBC and BMP. Gastritis without bleeding-Elevate head of bed after meals, monitor stools for blood, lowest effective dose of PPI, consider Tums. Pneumonia of both lungs due to infectious organism-Merrem Acute rehab for endurance traing with Pulse Ox to monitoring oxygen saturation and heart rate with O2 titration to lowest effective dose. Pulse oximeter checks to shift and at HS to dose and titrate oxygen and aerosol treatments monitor for nocturnal hypoxemia, monitor vital signs, oxygen prn. Focus on energy conservation. eft a Pleurx tube in O2   drain  Progressive anemia with history of GERD-and chronic anticoagulation for cardiac issues -elevate head of bed after meals, monitor stools for blood, lowest effective dose of PPI, consider Tums. Anticoagulation coumadinization for P-zhu-vvsuyfi pharmacist regarding INR management    Acute kidney failure-Eliminate toxic medications, monitor I's and O's focusing on urine output, recheck BMP. Spinal stenosis of lumbar region with neurogenic claudication    Balance disorder-focus on balance and therapy      Focus on reassessing rehab goals and coordinating therapy and medical self care issues.   Continue to monitor H&H which now looks stable and steady when we will recheck in the morning        Electronically signed by Lyric Cardona DO on 2/27/23 at 8:24 AM WARREN Mike D.O., PM&R     Attending [address]

## 2023-03-09 NOTE — PROGRESS NOTES
Physical Therapy Rehab Treatment Note  Facility/Department: [facility]  Room: X80B802-14       NAME: Jay Shetty  : 1935 (82 y.o.)  MRN: [id number]  CODE STATUS: Full Code    Date of Service: 3/9/2023       Restrictions:  Restrictions/Precautions: Fall Risk  Position Activity Restriction  Other position/activity restrictions: pleurx on L side    SUBJECTIVE:   Subjective: \"I am doing good today\"    Pain  Pain: Denies pain pre and post session    OBJECTIVE:     Sit to Stand  Assistance Level: Modified independent  Stand to Sit  Assistance Level: Modified independent    Ambulation  Surface: Carpet; Level surface; Uneven surface  Device: Rolling walker  Distance: 200'  Activity: Within Unit  Activity Comments: Good safety reciprocal pattern  Assistance Level: Independent  Gait Deviations: Slow jefe;Decreased heel strike left;Decreased step length bilateral  Skilled Clinical Factors: Improved safety with extended distance gait training. Decreased SOB noted    Stairs  Stair Height: 6''  Device: One handrail  Number of Stairs: 4  Additional Factors: Verbal cues; Non-reciprocal going down;Reciprocal going up  Assistance Level: Supervision  Skilled Clinical Factors: Good safety    Neuromuscular Education  NDT Treatment: Gait   Neuromuscular Comments: Gait drills in // bars lateral stepping with UUE support/ without UE support, forward and retro stepping with UUE support. Standing on blue foam without UE support- Increased postural sway able to self correct. Weighted gait training with ww and 2# ankle weight to improve B foot clearance x 50'    PT Exercises  A/AROM Exercises: Seated exercises: 2# ankle weight, LAQ's, Hip flexion, Ball squeezes, MRE hip abduction x 20 each    Activity Tolerance  Activity Tolerance: Patient tolerated treatment well    ASSESSMENT/PROGRESS TOWARDS GOALS:   Assessment  Assessment: Therapy session focused on improving balance and endurance. TEIXEIRA score improved to 39/56.  Improved safety throughout with all mobility  Activity Tolerance: Patient tolerated treatment well  Discharge Recommendations: Continue to assess pending progress    Goals:  Long Term Goals  Long Term Goal 1: indep bed mobility  Long Term Goal 2: Pt will demonstrate transfers indep with safest AD  Long Term Goal 3: Pt will demonstrate amb >/= 150ft supervision with safest AD - indep for 30 feet  Long Term Goal 4: Pt will demonstrate stair negotiation 3 steps without rails with safest AD SBA  Long Term Goal 5: Pt will demonstrate pantoja balance assessment >/= 45/56 for decreased risk for falls. Patient Goals   Patient Goals : Annelle Roup out of chairs easier. drive a car.  stand for longer time. \"    PLAN OF CARE/Safety:   Safety Devices  Type of Devices: All fall risk precautions in place; Chair alarm in place      Therapy Time:   Individual   Time In 1330   Time Out 1430   Minutes 60     Minutes: 60  Transfer/Bed mobility training: 10  Gait training: 15  Neuro re education: 20  Therapeutic ex: [address], PTA, 03/09/23 at 4:19 PM

## 2023-03-09 NOTE — PLAN OF CARE
Problem: Discharge Planning  Goal: Discharge to home or other facility with appropriate resources  3/9/2023 0011 by Lisa Ponce RN  Outcome: Progressing  Flowsheets (Taken 3/8/2023 2045)  Discharge to home or other facility with appropriate resources: Identify barriers to discharge with patient and caregiver  3/8/2023 1307 by Travon More RN  Outcome: Progressing  Flowsheets (Taken 3/8/2023 [id number])  Discharge to home or other facility with appropriate resources:   Arrange for needed discharge resources and transportation as appropriate   Identify discharge learning needs (meds, wound care, etc)

## 2023-03-09 NOTE — PROGRESS NOTES
[facility]  Facility/Department: [facility]  Speech Language Pathology   Treatment Note          Walter Amado  1935  [id number]  [x]   confirmed    Date: 3/9/2023      Restrictions/Precautions: Fall Risk  Position Activity Restriction  Other position/activity restrictions: pleurx on L side    Weight: 263 lb 6.4 oz (119.5 kg)     ADULT DIET; Regular    SpO2: 100 % (23 0730)  O2 Flow Rate (L/min): 2 L/min (23 0457)  No active isolations    Rehab Diagnosis:      Subjective:  Alert, Cooperative, and Pleasant        Interventions used this date:  Cognitive Skill Development    Objective/Assessment:  Patient progressing towards goals:  Short Term Goals  Time Frame for Short Term Goals: 2 weeks or LOS until goals are met. Goal 1: To increase safety awareness and judgment for safe completion of ADLs secondary to pt's cognitive deficits, pt will complete abstract reasoning tasks (i.e. Word deduction, convergent and divergent naming, similarities/differences) with 80% accuracy and min cues. Patient named 6 items in an abstract category with 71% accuracy I, with min cueing 100% accuracy. Goal 2: To increase safety awareness and judgment for safe completion of ADLs secondary to pt's cognitive deficits,  pt will complete min-mod level problem solving tasks related to ADLs (e.g. medication) with 80% accuracy and min cues. Patient completed an appointment card task with corresponding questions with 86% accuracy I, with supervised assistance 100% accuracy. Patient completed mid-level math word problems with 60% accuracy I, with min cueing 100% accuracy. Goal 3: To increase safety awareness and judgment for safe completion of ADLs secondary to pt's cognitive deficits, pt will provide reasonable solutions to problems of everyday living with 80% accuracy and min cues. Goal 4:  To decrease pt's cognitive deficits through the use of compensatory strategies, the pt will be educated on 3 different memory strategies and verbalize how he/she might use them at home in 3 ways with  min cues. Patient completed a word list retention task with recall by attribute inclusion utilizing the repetition out loud and caregiver repetition strategies with the following accuracy:  4 word repetition: 100% accuracy I  4 word attribute inclusion recall: 83% accuracy I, with repetition 100%  5 word repetition: 25% accuracy I, with repetition 75%  5 word attribute inclusion recall: 100% accuracy I    Short-term Goals  Timeframe for Short-term Goals: n/a      Treatment/Activity Tolerance:  Patient tolerated treatment well    Plan:  Continue per POC    Pain Assessment:  Patient does not c/o pain. Pain Re-assessment:  Patient does not c/o pain. Patient/Caregiver Education:  Patient educated on session and progression towards goals.     Safety Devices:  Chair alarm in place      Speech Therapy Level of Assistance Scale    AUDITORY COMPREHENSION  Rating:  Modified Independent-Supervised Assistance    VERBAL EXPRESSION  Rating:  Supervised Assistance    MOTOR SPEECH  Rating: Independent    PROBLEM SOLVING  Rating: Supervised Assistance    MEMORY  Rating:  Modified Independent-Supervised Assistance        Therapy Time  SLP Individual Minutes  Time In: 1300  Time Out: 1330  Minutes: 30            Signature: Brayden Petersen SLP

## 2023-03-09 NOTE — PROGRESS NOTES
Warfarin Dosing - Pharmacy Consult Note  Consulting Provider: EDELMIRA Jaime CNP  Indication:  Atrial Fibrillation  Warfarin Dose prior to admission: 4 mg T/TH/Sat/Sun, 5 mg M/W/F    Concurrent anticoagulants/antiplatelets: ASA 81 mg  Significant Drug Interactions: No obvious interactions  Recent Labs     03/07/23  1401 03/08/23  0450 03/08/23  0721 03/09/23  0359   INR 2.8 3.2  --  3.0   HGB  --  6.8* 7.2* 7.2*   PLT  --  319  --  368     Recent warfarin administrations                     warfarin (COUMADIN) tablet 1 mg (mg) 1 mg Given 03/07/23 1748                   Date   INR    Dose  2/24       1.6        5 mg   2/25       1.9        4 mg   2/26       2.5        2 mg  2/27       2.9        2.5 mg   2/28       2.8         4 mg  3/1         3.2        HOLD  3/2         3.4        HOLD  3/3         3.4        HOLD  3/4         3.0         2 mg  3/5         3.0         2 mg  3/6         3.1        HOLD  3/7         2.8         1 mg  3/8         3.2        HOLD  3/9         3.0        HOLD    Assessment/Plan  (Goal INR: 2 - 3)  INR of 3.0 is therapeutic. This is at top of patient's range and when dosed with 1 mg patient INR jumps almost 0.5. Will HOLD warfarin one more day. Active problem list reviewed. INR orders are placed. Chart reviewed for pertinent labs, drug/diet interactions, and past doses. Documentation of patient's clinical condition was reviewed. Pharmacy Dosing:  Pharmacy will continue to follow. HUBERT Jc. Ph.  3/9/2023  11:24 AM

## 2023-03-10 LAB
HCT VFR BLD CALC: 25 % (ref 42–52)
HEMOGLOBIN: 8 G/DL (ref 14–18)
INR BLD: 2.7
MCH RBC QN AUTO: 27.2 PG (ref 27–31.3)
MCHC RBC AUTO-ENTMCNC: 31.8 % (ref 33–37)
MCV RBC AUTO: 85.5 FL (ref 79–92.2)
PDW BLD-RTO: 18.8 % (ref 11.5–14.5)
PLATELET # BLD: 400 K/UL (ref 130–400)
PROTHROMBIN TIME: 29.2 SEC (ref 12.3–14.9)
RBC # BLD: 2.92 M/UL (ref 4.7–6.1)
REASON FOR REJECTION: NORMAL
REJECTED TEST: NORMAL
WBC # BLD: 7.1 K/UL (ref 4.8–10.8)

## 2023-03-10 PROCEDURE — 6370000000 HC RX 637 (ALT 250 FOR IP): Performed by: PHYSICAL MEDICINE & REHABILITATION

## 2023-03-10 PROCEDURE — 36415 COLL VENOUS BLD VENIPUNCTURE: CPT

## 2023-03-10 PROCEDURE — 85610 PROTHROMBIN TIME: CPT

## 2023-03-10 PROCEDURE — 97116 GAIT TRAINING THERAPY: CPT

## 2023-03-10 PROCEDURE — 6370000000 HC RX 637 (ALT 250 FOR IP): Performed by: NURSE PRACTITIONER

## 2023-03-10 PROCEDURE — 6370000000 HC RX 637 (ALT 250 FOR IP): Performed by: INTERNAL MEDICINE

## 2023-03-10 PROCEDURE — 94762 N-INVAS EAR/PLS OXIMTRY CONT: CPT

## 2023-03-10 PROCEDURE — 97110 THERAPEUTIC EXERCISES: CPT

## 2023-03-10 PROCEDURE — 85027 COMPLETE CBC AUTOMATED: CPT

## 2023-03-10 PROCEDURE — 6370000000 HC RX 637 (ALT 250 FOR IP): Performed by: FAMILY MEDICINE

## 2023-03-10 PROCEDURE — 6370000000 HC RX 637 (ALT 250 FOR IP): Performed by: REGISTERED NURSE

## 2023-03-10 PROCEDURE — 99232 SBSQ HOSP IP/OBS MODERATE 35: CPT | Performed by: PHYSICAL MEDICINE & REHABILITATION

## 2023-03-10 PROCEDURE — 1180000000 HC REHAB R&B

## 2023-03-10 PROCEDURE — 97535 SELF CARE MNGMENT TRAINING: CPT

## 2023-03-10 RX ORDER — DIAPER,BRIEF,INFANT-TODD,DISP
EACH MISCELLANEOUS 2 TIMES DAILY
Status: DISCONTINUED | OUTPATIENT
Start: 2023-03-10 | End: 2023-03-13 | Stop reason: HOSPADM

## 2023-03-10 RX ORDER — WARFARIN SODIUM 2 MG/1
1 TABLET ORAL
Status: COMPLETED | OUTPATIENT
Start: 2023-03-10 | End: 2023-03-10

## 2023-03-10 RX ADMIN — HYDROCORTISONE ACETATE: 1 CREAM TOPICAL at 14:04

## 2023-03-10 RX ADMIN — ACETAMINOPHEN 650 MG: 325 TABLET ORAL at 08:45

## 2023-03-10 RX ADMIN — NALOXEGOL OXALATE 25 MG: 12.5 TABLET, FILM COATED ORAL at 08:44

## 2023-03-10 RX ADMIN — ACETAMINOPHEN 650 MG: 325 TABLET ORAL at 21:42

## 2023-03-10 RX ADMIN — DOCUSATE SODIUM 100 MG: 100 CAPSULE, LIQUID FILLED ORAL at 21:43

## 2023-03-10 RX ADMIN — Medication 100 MG: at 08:44

## 2023-03-10 RX ADMIN — Medication 5 MG: at 21:43

## 2023-03-10 RX ADMIN — POTASSIUM CHLORIDE 10 MEQ: 750 CAPSULE, EXTENDED RELEASE ORAL at 08:45

## 2023-03-10 RX ADMIN — ASPIRIN 81 MG: 81 TABLET, COATED ORAL at 08:45

## 2023-03-10 RX ADMIN — GUAIFENESIN 600 MG: 600 TABLET ORAL at 21:43

## 2023-03-10 RX ADMIN — ROSUVASTATIN CALCIUM 10 MG: 5 TABLET, FILM COATED ORAL at 08:44

## 2023-03-10 RX ADMIN — HYDROCORTISONE ACETATE: 1 CREAM TOPICAL at 21:44

## 2023-03-10 RX ADMIN — ACETAMINOPHEN 650 MG: 325 TABLET ORAL at 14:04

## 2023-03-10 RX ADMIN — GUAIFENESIN 600 MG: 600 TABLET ORAL at 08:45

## 2023-03-10 RX ADMIN — Medication 2000 UNITS: at 17:04

## 2023-03-10 RX ADMIN — METOPROLOL TARTRATE 25 MG: 25 TABLET, FILM COATED ORAL at 21:43

## 2023-03-10 RX ADMIN — FUROSEMIDE 20 MG: 20 TABLET ORAL at 08:45

## 2023-03-10 RX ADMIN — METOPROLOL TARTRATE 25 MG: 25 TABLET, FILM COATED ORAL at 08:46

## 2023-03-10 RX ADMIN — WARFARIN SODIUM 1 MG: 2 TABLET ORAL at 17:04

## 2023-03-10 ASSESSMENT — PAIN SCALES - GENERAL
PAINLEVEL_OUTOF10: 4
PAINLEVEL_OUTOF10: 0
PAINLEVEL_OUTOF10: 2
PAINLEVEL_OUTOF10: 4

## 2023-03-10 ASSESSMENT — PAIN DESCRIPTION - ORIENTATION
ORIENTATION: LEFT
ORIENTATION: LEFT

## 2023-03-10 ASSESSMENT — PAIN DESCRIPTION - DESCRIPTORS
DESCRIPTORS: ACHING

## 2023-03-10 ASSESSMENT — PAIN DESCRIPTION - LOCATION
LOCATION: SHOULDER
LOCATION: SHOULDER
LOCATION: GENERALIZED

## 2023-03-10 ASSESSMENT — PAIN - FUNCTIONAL ASSESSMENT: PAIN_FUNCTIONAL_ASSESSMENT: ACTIVITIES ARE NOT PREVENTED

## 2023-03-10 NOTE — PROGRESS NOTES
Subjective: The patient complains of moderate to severe acute on chronic progressive fatigue and  PRIETO partially relieved by rest, medications, PT,  OT,   SLP and rest and exacerbated by recent illness  . Patient had initially been treated at [facility] AT [place] where a nerve stimulator placement was attempted but delayed due to high INR. Once the procedure was finally initiated it was aborted because of possible CSF leak. Patient became shortness of breath postprocedure but this was found to be largely unrelated to the procedure but related to a large loculated pleural effusion. Patient was transferred to [facility] for thoracotomy and VATS procedure performed by Dr. Mir Swain. As noted patient had a chest tube inserted by Dr. Mir Swain on 2/18/2023. He has been followed by thoracic surgery pulmonology oncology GI and cardiology as well as the hospitalist.     From a thoracic surgery standpoint cultures were sent from his pleural effusion which were positive and he is now on IV antibiotics. The left a Pleurx tube in O2   drain 3 times a week. The plan is for outpatient removal of the drain and if any worsening symptoms occur after the Pleurx is removed possible VATS surgery. They are trying to avoid surgery for now. Pulmonology followed him advising titrating O2 to keep sats above 90 continue home CPAP and continue draining the Pleurx daily. 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 He they noted his symptoms were improving and he has a history of following with Dr. Morgan Rodgers at [facility] in [facility] regarding his mesothelioma. Discussed options regarding possible chemotherapy palliative care intermittent drainage of the Pleurx catheter and possible hospice care. Currently they are advising treating anemia with IV Venofer here.   Cardiology was consulted and obtain an echocardiogram to assess the pericardial effusion    I am concerned about patients medical complexities and barriers to advancing in rehab goals including  improved pain control. He is complaining of flareup in his left shoulder x-rays reviewed. I would suggest heating pad, Lidoderm, pain medication and a left shoulder injection. I reviewed current care and plans for further care with other rehab providers including nursing and case management. According to recent nursing note, no recent substantive nursing notes however I reviewed his case in depth with nursing especially regarding his anemia. I am concerned about his anemia however his H&H is stabilized I am hoping to get rid of the Daily H&H checks. ROS x10: The patient also complains of severely impaired mobility and activities of daily living. Otherwise no new problems with vision, hearing, nose, mouth, throat, dermal, cardiovascular, GI, , pulmonary, musculoskeletal, psychiatric or neurological. See also Acute Rehab PM&R H&P. Vital signs:  /86   Pulse 64   Temp 98.1 °F (36.7 °C) (Oral)   Resp 18   Ht 5' 7\" (1.702 m)   Wt 263 lb 6.4 oz (119.5 kg)   SpO2 97%   BMI 41.25 kg/m²   I/O:   PO/Intake:  fair PO intake,   Reg diet    Bowel:   continent    Bladder: continent    bishop  General:  Patient is well developed,   adequately nourished, and    well kempt. HEENT:    Pupils equal, hearing intact to loud voice, external inspection of ear and nose benign. Inspection of lips, tongue and gums  thrush    Musculoskeletal: No significant change in strength or tone. All joints stable. Inspection and palpation of digits and nails show no clubbing, cyanosis or inflammatory conditions. Neuro/Psychiatric: Affect: flat but pleasant. Alert and oriented to person, place and situation with  min cues. No significant change in deep tendon reflexes or sensation  Lungs:  Diminished, CTA-B. Respiration effort is   normal at rest.   Pleurx cath dressing D&I L Side. Heart:   S1 = S2,    irreg a fib.     Abdomen:  Soft, non-tender, no enlargement of liver or spleen.  Extremities:   mod lower extremity edema    Skin:   Intact to general survey,  Pleurx cath dressing D&I L Side.     Rehabilitation:  Physical Therapy:   Bed mobility:  Bed mobility  Rolling to Left: Minimal assistance (02/26/23 1242)  Rolling to Right: Minimal assistance (02/26/23 1242)  Supine to Sit: Moderate assistance (02/26/23 1242)  Sit to Supine: Moderate assistance (02/26/23 1242)  Bed Mobility Comments: HOB flat; cues to avoid breath holding (02/26/23 1242)  Roll Left  Assistance Level: Modified independent (03/08/23 1613)  Skilled Clinical Factors: HOB flat with use of bed rail (03/08/23 1613)  Roll Right  Assistance Level: Modified independent (03/08/23 1613)  Skilled Clinical Factors: With use of bed rail (03/08/23 1613)  Sit to Supine  Assistance Level: Modified independent (03/08/23 1613)  Skilled Clinical Factors: Increased time to complete (03/06/23 1605)  Supine to Sit  Assistance Level: Modified independent (03/08/23 1613)  Skilled Clinical Factors: Cues for breathing throughout transfer (03/06/23 1605)  Scooting  Assistance Level: Modified independent (03/08/23 1613)  Transfers:  Transfers  Sit to Stand: Moderate Assistance (02/26/23 1243)  Stand to Sit: Moderate Assistance (02/26/23 1243)  Comment: poor use of L LE during sit to stand; WW used (02/26/23 1243)  Sit to Stand  Assistance Level: Modified independent (03/09/23 1219)  Skilled Clinical Factors: Completes of firsst try good technique (03/08/23 1125)  Stand to Sit  Assistance Level: Modified independent (03/09/23 1219)  Skilled Clinical Factors: completed safely without vc (03/08/23 1125)  Bed To/From Chair  Technique: Stand step (03/09/23 1219)  Assistance Level: Modified independent (03/09/23 1219)  Skilled Clinical Factors: Completes with WW (03/08/23 1613)  Car Transfer  Assistance Level: Stand by assist (03/07/23 0919)  Skilled Clinical Factors: Enrico to rise from low  level chair able to lift BLE in and out of car (02/27/23 0553)  Gait:   Ambulation  Surface: Level tile (03/02/23 1406)  Device: Rolling Walker (03/02/23 1406)  Other Apparatus: O2 (03/02/23 1406)  Assistance: Contact guard assistance;Stand by assistance (03/02/23 1406)  Quality of Gait: cues for posture and upward gaze, decreased step length and height (03/02/23 1406)  Gait Deviations: Slow Jefe; Increased MENA; Decreased step length;Decreased step height (02/26/23 1244)  Distance: 100' (03/02/23 1406)  Ambulation  Surface: Carpet; Level surface; Uneven surface (03/09/23 1219)  Device: Rolling walker (03/09/23 1219)  Distance: 50 (03/09/23 1219)  Activity: Within Unit (03/09/23 1219)  Activity Comments: Good safety reciprocal pattern (03/09/23 1219)  Additional Factors: Verbal cues (03/08/23 1118)  Assistance Level: Independent (03/09/23 1219)  Gait Deviations: Slow jefe;Decreased heel strike left;Decreased step length bilateral (03/09/23 1219)  Skilled Clinical Factors: Independent and good safety with short distance gait (03/09/23 1219)  Stairs:  Stairs/Curb  Stairs?: No (03/02/23 1406)  Stairs  Stair Height: 6'' (03/09/23 1219)  Device: One handrail (03/09/23 1219)  Number of Stairs: 4 (03/09/23 1219)  Additional Factors: Verbal cues; Non-reciprocal going down;Reciprocal going up (03/09/23 1219)  Assistance Level: Supervision (03/09/23 1219)  Skilled Clinical Factors: Good safety (03/09/23 1219)  W/C mobility:       Occupational Therapy:   Hand Dominance: Right  ADL  Feeding: Stand by assistance (02/26/23 1201)  Grooming: Minimal assistance (02/26/23 1201)  UE Bathing: Minimal assistance (02/26/23 1201)  LE Bathing: Maximum assistance (02/26/23 1201)  LE Bathing Skilled Clinical Factors: periare only per pt request (02/26/23 1201)  UE Dressing:  Moderate assistance (02/26/23 1201)  LE Dressing: Maximum assistance (02/26/23 1201)  Toileting: Maximum assistance (02/26/23 1201)  Additional Comments: sponge bath ADL completed sitting EOB and patially on toilet. (02/26/23 1201)  Toilet Transfers  Toilet - Technique: Ambulating (02/26/23 1207)  Equipment Used: Grab bars (02/26/23 1207)  Toilet Transfer: Moderate assistance (02/26/23 1207)  Toilet Transfers Comments: CGA on ModA off (02/26/23 1207)          Speech Therapy:      Comprehension:  (AUditory comprehension is [facility])  Verbal Expression:  (Supervised assist required for high level naming and reasoning tasks secondary to reduced thought organization)  Diet/Swallow:           Compensatory Swallowing Strategies : Alternate solids and liquids, Eat/Feed slowly, Upright as possible for all oral intake, Small bites/sips          Lab/X-ray studies reviewed, analyzed and discussed with patient and staff:   Recent Results (from the past 24 hour(s))   SPECIMEN REJECTION    Collection Time: 03/10/23  5:11 AM   Result Value Ref Range    Rejected Test PT     Reason for Rejection see below    Protime-INR    Collection Time: 03/10/23  6:49 AM   Result Value Ref Range    Protime 29.2 (H) 12.3 - 14.9 sec    INR 2.7    CBC    Collection Time: 03/10/23  7:54 AM   Result Value Ref Range    WBC 7.1 4.8 - 10.8 K/uL    RBC 2.92 (L) 4.70 - 6.10 M/uL    Hemoglobin 8.0 (L) 14.0 - 18.0 g/dL    Hematocrit 25.0 (L) 42.0 - 52.0 %    MCV 85.5 79.0 - 92.2 fL    MCH 27.2 27.0 - 31.3 pg    MCHC 31.8 (L) 33.0 - 37.0 %    RDW 18.8 (H) 11.5 - 14.5 %    Platelets 380 060 - 370 K/uL       3/5/23-CT  Left shoulder   Advanced osteoarthritis of the glenohumeral joint with intra-articular   ossified bodies. 2.  No acute fracture identified. 3.  Calcific tendinitis versus bursitis of the rotator cuff tendon,   subacromial subdeltoid bursa. XR ABDOMEN   2/18/2023   1. Complete opacification of left hemithorax compatible with some combination of pleural effusion and atelectasis. Secondary obscuration of left heart border. 2.  Nonobstructive bowel gas pattern.   No acute abdominal disease identified. 3.  Probable left renal stone. CT CHEST   2/23/2023 : Mediastinum: Thyroid is homogeneous in appearance. Vascular calcifications seen within the coronary vessels. Cardiac chambers are mildly enlarged. Pacer leads in satisfactory position. Moderate-sized pericardial effusion. Bulky adenopathy identified in the left hilar region likely reactive. Small lymph nodes seen scattered throughout the mediastinum likely reactive. Atherosclerotic disease seen diffusely throughout the thoracic aorta. Lungs/pleura: There is small chest tube identified in place on the left with small left pleural effusion. Airspace consolidation seen within the left upper and lower lung fields suggesting superimposed infiltrate such as pneumonia. Mild increased markings identified at the right lung base to suggest atelectatic change. Trace pneumothorax identified anteriorly. Upper Abdomen: Stones in the gallbladder. Small hiatal hernia. Soft Tissues/Bones: Multilevel degenerative changes seen within the spine. No acute chest wall abnormality. Indwelling chest tube identified on the left with small left pleural effusion and trace anterior pneumothorax. Consolidation seen in airspace disease throughout the left upper and lower lobes to suggest a multifocal superimposed pneumonia. Soft tissue density seen in the hilar region to suggest possible reactive adenopathy. Moderate-sized pericardial effusion. Minimal atelectatic changes seen at the right lung base. Incidental stones in the gallbladder with small hiatal hernia. XR CHEST   2/19/2023   1. Minimal partial clearing of the left lung. The previously noted left pleural effusion is smaller   2. Stable position of the left chest tube   3. The right lung is clear. XR CHEST  2/18/2023   1. Apparent interval placement of left chest tube catheter. No pneumothorax. 2.  Persistent opacification of the left hemithorax. XR CHEST  2/18/2023   1.  Near complete whiteout of the left hemithorax likely represent a combination of partial collapse of the left lung and large pleural effusion   2. Cardiomegaly   3. The right lung is grossly clear. US DUP UPPER EXTREMITY RIGHT VENOUS  2023   No evidence of DVT. FLUORO FOR SURGICAL PROCEDURES  2023  12 spot images of the lumbar spine were obtained. Intraprocedural fluoroscopic spot images as above. See separate procedure report for more information. EGD 2023  [id number] [facility] Patient: Carole Rodríguez MRN: [id number] : 1935 Account: Gender: Male Age: 82 Years Procedure: Upper GI endoscopy Date: 2023 Attending Physician: Cira Gale Indications:        -  Anemia        -  Melena Medications:        -  Monitored Anesthesia Care Complications:        -  No immediate complications. Estimated Blood Loss:        -  Estimated blood loss: none. Procedure:        - The Gastroscope was introduced through the mouth and advanced to the second           part of the duodenum.        -  The patient tolerated the procedure well. Findings:        -  A 2 cm hernia was found.        -  Esophagogastric landmarks were identified: the gastroesophageal junction           was found at 36 cm, the lower esophageal sphincter was found at 38 cm and the           upper extent of the gastric folds was found at 36 cm from the incisors. -  Patchy moderate inflammation characterized by erythema was found in the           gastric antrum and in the gastric body. -  The examined duodenum was normal.        -  The cardia and gastric fundus were normal on retroflexion.        - No old or fresh blood noted Impression:        -  2 cm hernia. -  Esophagogastric landmarks identified.        -  Gastritis. -  Normal examined duodenum.        -  No specimens collected. - No old or fresh blood noted Recommendation:        -  Put patient on a clear liquid diet starting today.         -  Continue present medications. -     - 01111, Esophagogastroduodenoscopy, flexible, transoral; diagnostic,           including collection of specimen(s) by brushing or washing, when performed           (separate procedure) Diagnosis Code(s):        - D64.9, Anemia, unspecified        - K92.1, Melena (includes Hematochezia)        - K44.9, Diaphragmatic hernia without obstruction or gangrene        - K29.70, Gastritis, unspecified, without bleeding           Previous extensive, complex labs, notes and diagnostics reviewed and analyzed. ALLERGIES:    Allergies as of 02/25/2023 - Fully Reviewed 02/25/2023   Allergen Reaction Noted    Benadryl [diphenhydramine hcl] Anxiety 01/31/2012    Diphenhydramine Anxiety 05/09/2018      (please also verify by checking [facility])      Complex Physical Medicine & Rehab Issues Assess & Plan:   Severe abnormality of gait and mobility and impaired self-care and ADL's secondary to progressive cardiopulmonary debility. Functional and medical status reassessed regarding patients ability to participate in therapies and patient found to be able to participate in acute intensive comprehensive inpatient rehabilitation program including PT/OT to improve balance, ambulation, ADLs, and to improve the P/AROM. Therapeutic modifications regarding activities in therapies, place, amount of time per day and intensity of therapy made daily. In bed therapies or bedside therapies prn. Bowel and Bladder dysfunction  , Neurogenic bowel and bladder:  frequent toileting, ambulate to bathroom with assistance, check post void residuals. Check for C.difficile x1 if >2 loose stools in 24 hours, continue bowel & bladder program.  Monitor bowel and bladder function. Lactinex 2 PO every AC. MOM prn, Brown Bomb prn, Glycerin suppository prn, enema prn. Encourage therapy and nursing to co-treat and problem solve re continence.     Severe back pain, lumbar, as well as generalized OA pain: reassess pain every shift and prior to and after each therapy session, give prn Tylenol and consider scheduled Tylenol, modalities prn in therapy, masage, Lidoderm, K-pad prn. Consider scheduled AM pain meds. Consult Ortho for left shoulder injection  Skin healing  ears and breakdown risk:  continue pressure relief program.  Daily skin exams and reports from nursing. Fatigue due to nutritional and hydration deficiency: Add and titrate vitamin B12 vitamin D and CoQ10 continue to monitor I&Os, calorie counts prn, dietary consult prn. Add healthy snack at night. Acute episodic insomnia with situational adjustment disorder:  prn Ambien, monitor for day time sedation. Falls risk elevated:  patient to use call light to get nursing assistance to get up, bed and chair alarm. Elevated DVT risk: progressive activities in PT, continue prophylaxis JOB hose, elevation and meds-see MAR. Complex discharge planning: Discharge March 12, 2023 home with his wife and home health care. Weekly team meeting every Monday to re-assess progress towards goals, discuss and address social, psychological and medical comorbidities and to address difficulties they may be having progressing in therapy. Patient and family education is in progress. The patient is to follow-up with their family physician after discharge.         Complex Active General Medical Issues that complicate care Assess & Plan:    Mesothelioma with dry cough-titrate O2, aerosols, follow-up with Dr. Aj Nelson, drain pleural Dex daily versus 3 times a week per protocols from pulmonology and lung surgery consult  Mixed hyperlipidemia,  Atherosclerosis of native artery of both lower extremities with intermittent claudication, Atrial fibrillation, Venous insufficiency-Acute rehab to monitor heart rate and rhythm with the option of telemetry and the effects of chronotropic medication with respect to increasing physical activity and exercise in PT, OT, ADLs with medication titration to lowest effective dosing. Continue blood signs every shift focusing on heart rate, rhythm and blood pressure checks with orthostatic checks-monitoring the effect of exercise, therapy and posture. Consult hospitalist for backup medical and adjust/add medications (aspirin, Lasix, Lopressor, Crestor, Coumadin, bridging Lovenox). Monitor heart rate and blood pressure as well as medications effects on vital signs before during and after therapy with especial focus on preventing orthostasis and falls risk. Recheck CBC and BMP. Gastritis without bleeding-Elevate head of bed after meals, monitor stools for blood, lowest effective dose of PPI, consider Tums. Pneumonia of both lungs due to infectious organism-[facility] Acute rehab for endurance traing with Pulse Ox to monitoring oxygen saturation and heart rate with O2 titration to lowest effective dose. Pulse oximeter checks to shift and at HS to dose and titrate oxygen and aerosol treatments monitor for nocturnal hypoxemia, monitor vital signs, oxygen prn. Focus on energy conservation. eft a Pleurx tube in O2   drain  Progressive anemia with history of GERD-and chronic anticoagulation for cardiac issues -elevate head of bed after meals, monitor stools for blood, lowest effective dose of PPI, consider Tums. Anticoagulation coumadinization for A-uoc-qihjsol pharmacist regarding INR management    Acute kidney failure-Eliminate toxic medications, monitor I's and O's focusing on urine output, recheck BMP. Spinal stenosis of lumbar region with neurogenic claudication    Balance disorder-focus on balance and therapy     Focus on emotional health and caregiver involvement in care.   Transition to less frequent labs now that his H&H is stable        Electronically signed by Quirino Torres DO on 2/27/23 at 8:24 AM WARREN Tatum D.O., PM&R     Attending    [address]

## 2023-03-10 NOTE — PROGRESS NOTES
OCCUPATIONAL THERAPY  INPATIENT REHAB TREATMENT NOTE  [address]      NAME: Alma Delia Sandoval  : 1935 (82 y.o.)  MRN: [id number]  CODE STATUS: Full Code  Room: N852/I347-43    Date of Service: 3/10/2023    Referring Physician: Dr. Dayanara Harkins Diagnosis: Impaired mobility and ADL's due to severe pulmonary debility    Restrictions  Restrictions/Precautions  Restrictions/Precautions: Fall Risk         Position Activity Restriction  Other position/activity restrictions: pleurx on L side    Patient's date of birth confirmed: Yes    SAFETY:  Safety Devices  Safety Devices in place: Yes  Type of devices: All fall risk precautions in place    SUBJECTIVE:  Subjective: \" id like to brush my hair. \"    Pain at start of treatment: Yes: 3/10    Pain at end of treatment: Yes: 3/10    Location: L shoulder  Nursing notified: No  Intervention: RN provided pain medication    COGNITION:  Orientation  Overall Orientation Status: Within Normal Limits  Orientation Level: Oriented X4  Cognition  Overall Cognitive Status: WFL      Pt's current cognitive status is:  Comprehension: Independent  Expression: Independent  Social Interaction: Mod I  Problem Solving: Supervision  Memory: Independent    OBJECTIVE:     Feeding  Assistance Level:  Independent  Grooming/Oral Hygiene  Assistance Level: Modified independent  Skilled Clinical Factors: standing at sink to brush hair and complete denture care (leans on forearms to complete task for endurance and balance)  Upper Extremity Bathing  Assistance Level: Modified independent  Lower Extremity Bathing  Equipment Provided: Long-handled sponge  Assistance Level: Modified independent  Skilled Clinical Factors: long  handled sponge for feet only  Upper Extremity Dressing  Assistance Level: Modified independent  Lower Extremity Dressing  Equipment Provided: Reachers  Assistance Level: Supervision  Skilled Clinical Factors: vc's to use reacher d/t demo difficulty threading RLE  Putting On/Taking Off Footwear  Equipment Provided: Sock aid  Assistance Level: Moderate assistance  Skilled Clinical Factors: sock aide to don socks needing extended time d/t demo difficulty pulling sock aide out of sock onto foot d/t foot being still damp possibly from shower  Toileting  Skilled Clinical Factors: none  Toilet Transfers  Skilled Clinical Factors: none  Tub/Shower Transfers  Type: Shower  Transfer From: Rolling walker  Transfer To: Shower chair with back  Assistance Level: Modified independent  Pt needed extended time for ADL's this day d/t completing some tasks with AE, and increased shortness of breath requiring intermittent recovery periods at times. Pt attempted to use sock aide to don JOB hose but could not pull the sock aide out of the hose; required TD (A) to don JOB hose. Functional Mobility  Device: Rolling walker  Activity: To/From bathroom  Assistance Level: Modified independent  Supine to Sit  Assistance Level: Supervision  Scooting  Assistance Level: Modified independent  Sit to Stand  Assistance Level: Modified independent  Stand to Sit  Assistance Level: Modified independent       Education:   Educated pt on needing a reacher at home for donning pants as needed and for picking up items off of the floor    Equipment recommendations:  OT Equipment Recommendations  Other: continue to assess      ASSESSMENT:  Assessment: pt fatigued, short of breath and tired; stated he \" did not sleep well last night. \" has pain in LUE shoulder this day  Activity Tolerance: Patient limited by fatigue;Patient limited by pain; Patient limited by endurance      PLAN OF CARE:  Balance training, Functional mobility training, Strengthening, ROM, Endurance training, Safety education & training, Patient/Caregiver education & training, Equipment evaluation, education, & procurement, Home management training, Self-Care / ADL, Coordination training  Continue OT POC until discharge    Patient goals : to get stronger and possibly return to driving  Time Frame for Long Term Goals : within 1.5-2wks pt will demosntrate progress in the following areas to achieve specific LTG's listed in the initial eval  Long Term Goal 1: improve overall strength/endurance for functional tasks. Long Term Goal 2: improve independence with self care  Long Term Goal 3: improve sitting/standing balance for ADL tasks  Long Term Goal 4: improve functional mobility with LRD for safe item transport/retrieval  Long Term Goal 5: improve FMC to independently manipulate fasteners        Therapy Time:   Individual Group Co-Treat   Time In 930       Time Out 1100         Minutes 90             ADL/IADL trainin minutes     Electronically signed by:     HERMAN Martin,   3/10/2023, 10:42 AM

## 2023-03-10 NOTE — PROGRESS NOTES
Physical Therapy Rehab Treatment Note  Facility/Department: [facility]  Room: [facility]/U128-09       NAME: Charla Santizo  : 1935 (82 y.o.)  MRN: [id number]  CODE STATUS: Full Code    Date of Service: 3/10/2023     Restrictions:  Restrictions/Precautions: Fall Risk  Position Activity Restriction  Other position/activity restrictions: pleurx on L side    SUBJECTIVE:   Subjective: \" I did not sleep well last night    Pain  Pain: 4/10 L shoulder pain achy Pre and post session. RN called and medicated prior to therapy session    OBJECTIVE:     Sit to Stand  Assistance Level: Independent  Stand to Sit  Assistance Level: Independent  Bed To/From Chair  Assistance Level: Independent    Ambulation  Surface: Carpet  Device: Rolling walker  Distance: 200'  Activity: Within Unit  Activity Comments: Good safety reciprocal pattern  Additional Factors: Verbal cues  Assistance Level: Supervision  Gait Deviations: Slow jefe;Decreased heel strike left;Decreased step length bilateral  Activity comments: Supervision with extended distance due to SOB/ fatigue    Stairs  Stair Height: 6''  Device: One handrail  Number of Stairs: 4  Additional Factors: Verbal cues; Non-reciprocal going down;Reciprocal going up  Skilled Clinical Factors: Good safety    PT Exercises  Exercise Treatment: Seated exercises: Seated trunk extension over chair back, Scapular retraction x 20 each    Activity Tolerance  Activity Tolerance: Patient limited by fatigue;Patient limited by pain; Patient limited by endurance    ASSESSMENT/PROGRESS TOWARDS GOALS:   Assessment  Assessment: Patient is meeting gait, transfer and bed mobility goals and not meeting TEIXEIRA balance goal completing 39/56  Activity Tolerance: Patient limited by fatigue;Patient limited by pain; Patient limited by endurance  Discharge Recommendations: Continue to assess pending progress    Goals:  Long Term Goals  Long Term Goal 1: indep bed mobility- Met  Long Term Goal 2: Pt will demonstrate transfers indep with safest AD- Met  Long Term Goal 3: Pt will demonstrate amb >/= 150ft supervision with safest AD - indep for 30 feet- Met 50' Independent 150' Supervision  Long Term Goal 4: Pt will demonstrate stair negotiation 3 steps without rails with safest AD SBA- Utilizes rails to simulate using freezer and door jam  Long Term Goal 5: Pt will demonstrate pantoja balance assessment >/= 45/56 for decreased risk for falls. - Not met 39/56  Patient Goals   Patient Goals : \"get out of chairs easier. drive a car.  stand for longer time. \"    PLAN OF CARE/Safety:   Safety Devices  Type of Devices: All fall risk precautions in place; Chair alarm in place      Therapy Time:   Individual   Time In 1430   Time Out 1500   Minutes 30     Minutes: 30  Transfer/Bed mobility trainin  Gait training: 15  Therapeutic ex: COLE Wilkins, 03/10/23 at 3:43 PM

## 2023-03-10 NOTE — PLAN OF CARE
Problem: Discharge Planning  Goal: Discharge to home or other facility with appropriate resources  3/9/2023 2348 by Adamaris Contreras RN  Outcome: Progressing  3/9/2023 1640 by Binat Brannon RN  Outcome: Progressing     Problem: Pain  Goal: Verbalizes/displays adequate comfort level or baseline comfort level  3/9/2023 2348 by Adamaris Contreras RN  Outcome: Progressing  3/9/2023 1640 by Binta Brannon RN  Outcome: Progressing     Problem: Safety - Adult  Goal: Free from fall injury  3/9/2023 2348 by Adamaris Contreras RN  Outcome: Progressing  3/9/2023 1640 by Binta Brannon RN  Outcome: Progressing     Problem: ABCDS Injury Assessment  Goal: Absence of physical injury  3/9/2023 2348 by Adamaris Contreras RN  Outcome: Progressing  3/9/2023 1640 by Binta Brannon RN  Outcome: Progressing     Problem: Chronic Conditions and Co-morbidities  Goal: Patient's chronic conditions and co-morbidity symptoms are monitored and maintained or improved  3/9/2023 2348 by Adamaris Contreras RN  Outcome: Progressing  3/9/2023 1640 by Binta Brannon RN  Outcome: Progressing     Problem: Skin/Tissue Integrity  Goal: Absence of new skin breakdown  Description: 1. Monitor for areas of redness and/or skin breakdown  2. Assess vascular access sites hourly  3. Every 4-6 hours minimum:  Change oxygen saturation probe site  4. Every 4-6 hours:  If on nasal continuous positive airway pressure, respiratory therapy assess nares and determine need for appliance change or resting period.   3/9/2023 2348 by Adamaris Contreras RN  Outcome: Progressing  3/9/2023 1640 by Binta Brannon RN  Outcome: Progressing

## 2023-03-10 NOTE — FLOWSHEET NOTE
Pleur x catheter drained of 3cc yellow fluid. dressing reapplied. Site is intact with no rednessor drainage. Patient tolerated well.  Electronically signed by Selena Meier RN on 3/10/2023 at 3:34 PM

## 2023-03-10 NOTE — DISCHARGE INSTR - COC
Continuity of Care Form    Patient Name: Abdi Young   :  1935  MRN:  [id number]    Admit date:  2023  Discharge date:  23    Code Status Order: Full Code   Advance Directives:     Admitting Physician:  Jannette Eason DO  PCP: Siva Newman DO    Discharging Nurse: [address] Unit/Room#: M507/X751-30  Discharging Unit Phone Number: [phone]    Emergency Contact:   Extended Emergency Contact Information  Primary Emergency Contact: Ashleigh Rowe  Address: [address] [place], [facility] [address]  Phone: [phone]  Mobile Phone: [phone]  Relation: Spouse    Past Surgical History:  Past Surgical History:   Procedure Laterality Date    BACK SURGERY      COLONOSCOPY      COLONOSCOPY      CORONARY ANGIOPLASTY WITH STENT PLACEMENT  10/2006    x 1 cardiac stent    ENDOSCOPY, COLON, DIAGNOSTIC      EYE SURGERY      [place] with IOL OU    HERNIA REPAIR  2013    redo ca mesh in [facility].38 -- umbilical hernia    JOINT REPLACEMENT Bilateral  &     Bilateral TKR    KNEE SURGERY Left      arthroscopic surgery to repair meniscus of left knee    LAMINECTOMY N/A 2021    RIGHT BILATERAL L2-3 3-4 4-5 MICRODECOMPRESSION performed by Kendall Givesn MD at [facility]    PAIN MANAGEMENT PROCEDURE N/A 2023    ATTEMPTED SPINAL CORD STIMULATOR PERMANENT PLACEMENT performed by Deedee Isidro MD at [address] N/A 1/10/2023    SPINAL CORD STIMULATOR TRIAL performed by Deedee Isidro MD at [place]  age 8    UMBILICAL HERNIA REPAIR  2012    UPPER GASTROINTESTINAL ENDOSCOPY N/A 2023    EGD DIAGNOSTIC ONLY performed by Lisset Brunner MD at [facility]       Immunization History:   Immunization History   Administered Date(s) Administered    COVID-19, PFIZER PURPLE top, DILUTE for use, (age 15 y+), 30mcg/0.3mL 2021, 12/10/2021       Active Problems:  Patient Active Problem List   Diagnosis Code    Ventral hernia K43.9    Ventral hernia, recurrent K43.2    Polycythemia D75.1    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    Atherosclerosis of native artery of both lower extremities with intermittent claudication (HCC) I70.213    Atrial fibrillation (HCC) I48.91    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.1, N13.8    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    Venous insufficiency I87.2    Intermittent claudication (HCC) I73.9    Transient ischemic attack G45.9    Sleep apnea G47.30    Rosacea L71.9    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    Excessive daytime sleepiness G47.19    Hx of blood clots Z86.718    Former smoker Z87.891    Lumbar stenosis with neurogenic claudication M48.062    Congestive heart failure (HCC) I50.9    Lumbar spondylosis M47.816    Balance disorder R26.89    Postlaminectomy syndrome, lumbar region M96.1    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.9    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    Mixed hyperlipidemia E78.2    Sick sinus syndrome ([address] I49.5    Presence of cardiac pacemaker Z95.0    Lumbar post-laminectomy syndrome M96.1    Pleural effusion J90    Melena K92.1    Gastritis without bleeding K29.70    Impaired mobility and activities of daily living dt CP debility Z74.09, Z78.9    Pneumonia of both lungs due to infectious organism J18.9    Acute kidney failure (HCC) N17.9    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    Gastrointestinal hemorrhage K92.2    SOB (shortness of breath) R06.02    Arthritis of shoulder region, 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       Isolation/Infection:   Isolation            No Isolation          Patient Infection Status       Infection Onset Added Last Indicated Last Indicated By Review Planned Expiration Resolved Resolved By    None active    Resolved    COVID-19 (Rule Out) 03/03/23 03/03/23 03/05/23 COVID-19, Rapid (Ordered)   03/05/23 Rule-Out Test Resulted    COVID-19 (Rule Out) 23 COVID-19, Rapid (Ordered)   23 Rule-Out Test Resulted    COVID-19 (Rule Out) 23 COVID-19, Rapid (Ordered)   23 Rule-Out Test Resulted    COVID-19 20 Covid-19 Ambulatory   12/15/20             Nurse Assessment:  Last Vital Signs: /86   Pulse 64   Temp 98.1 °F (36.7 °C) (Oral)   Resp 18   Ht 5' 7\" (1.702 m)   Wt 263 lb 6.4 oz (119.5 kg)   SpO2 97%   BMI 41.25 kg/m²     Last documented pain score (0-10 scale): Pain Level: 4  Last Weight:   Wt Readings from Last 1 Encounters:   23 263 lb 6.4 oz (119.5 kg)     Mental Status:  oriented, alert, coherent, logical, and thought processes intact    IV Access:  - None    Nursing Mobility/ADLs:  Walking   Assisted  Transfer  Independent  Bathing  Independent  Dressing  Independent  Toileting  Independent  Feeding  [address]  Independent  Med Delivery   whole    Wound Care Documentation and Therapy:        Elimination:  Continence: Bowel: Yes  Bladder: Yes  Urinary Catheter: None   Colostomy/Ileostomy/Ileal Conduit: No       Date of Last BM: 23  No intake or output data in the 24 hours ending 03/10/23 1453  No intake/output data recorded. Safety Concerns:     History of Falls (last 30 days) and At Risk for Falls    Impairments/Disabilities:      None    Nutrition Therapy:  Current Nutrition Therapy:   - Oral Diet:  General    Routes of Feeding: Oral  Liquids: Thin Liquids  Daily Fluid Restriction: no  Last Modified Barium Swallow with Video (Video Swallowing Test): not done    Treatments at the Time of Hospital Discharge:   Respiratory Treatments: yes  Oxygen Therapy:  is on oxygen at 2 L/min per nasal cannula.  At bedtime   Ventilator:    - No ventilator support    Rehab Therapies: Physical Therapy and Occupational Therapy  Weight Bearing Status/Restrictions: No weight bearing restrictions  Other Medical Equipment (for information only, NOT a DME order):  walker  Other Treatments: Incentive spirometry frequently to promote improved  NEED TO FOLLOW UP OUT PT. COLONOSCOPY. Patient's personal belongings (please select all that are sent with patient): All personal belongings sent home with pt. RN SIGNATURE:  Electronically signed by Cheli Lenz RN on 3/13/2023 at 10:45 AM      CASE MANAGEMENT/SOCIAL WORK SECTION    Inpatient Status Date: Admitted to inpatient on 2/25/23    Readmission Risk Assessment Score:  Readmission Risk              Risk of Unplanned Readmission:  23           Discharging to Facility/ Agency   Name: [facility]  Address:  Phone: [phone]  Fax:    Dialysis Facility (if applicable)   Name:  Address:  Dialysis Schedule:  Phone:  Fax:    / signature: Electronically signed by ANALY Merrill LSW on 3/10/23 at 2:53 PM EST    PHYSICIAN SECTION    Prognosis: Good    Condition at Discharge: Stable    Rehab Potential (if transferring to Rehab):     Recommended Labs or Other Treatments After Discharge:     Physician Certification: I certify the above information and transfer of Tyree Jordan  is necessary for the continuing treatment of the diagnosis listed and that he requires [address] for less 30 days.      Update Admission H&P: No change in H&P    PHYSICIAN SIGNATURE:  Quirino Torres DO    Electronically signed by ANALY Merrill LSW on 3/10/2023 at 2:53 PM

## 2023-03-10 NOTE — CARE COORDINATION
LSW met with pt and discussed planned discharge for 3/12, pt is in agreement and feels he will be ready. Given freedom of choice, pt chose [facility] and referral made to Vinh [facility]. Pt will need to be sent home with 3 Pleurx drain kits (not the ASPIRE). No new DME needed. Wife to come in on 3/12 to complete training with OT for how to cover Pleurx drain when bathing. Pt has no concerns or questions at this time. Electronically signed by ANALY Greenberg LSW on 3/10/2023 at 2:52 PM      LSW received 3 Pleurx drain kits from Yumiko Moralez (supervisor) and placed in a bag in pt's closet. Wife and pt are aware.  Electronically signed by ANALY Greenberg LSW on 3/10/2023 at 4:51 PM

## 2023-03-10 NOTE — PROGRESS NOTES
Physical Therapy Rehab Treatment Note  Facility/Department: [facility]  Room: Z259/O374-21       NAME: Katherine Moody  : 1935 (82 y.o.)  MRN: [id number]  CODE STATUS: Full Code    Date of Service: 3/10/2023     Restrictions:  Restrictions/Precautions: Fall Risk  Position Activity Restriction  Other position/activity restrictions: pleurx on L side    SUBJECTIVE:   Subjective: \" I did not sleep well last night    Pain  Pain: 4/10 L shoulder pain achy Pre and post session. RN called and medicated prior to therapy session    OBJECTIVE:     Roll Left  Assistance Level: Modified independent  Skilled Clinical Factors: HOB flat with use of bed rail  Roll Right  Assistance Level: Modified independent  Skilled Clinical Factors: With use of bed rail  Sit to Supine  Assistance Level: Independent  Supine to Sit  Assistance Level: Independent  Scooting  Assistance Level: Independent    Sit to Stand  Assistance Level: Independent  Stand to Sit  Assistance Level: Independent  Bed To/From Chair  Assistance Level: Independent  Car Transfer  Assistance Level: Independent  Skilled Clinical Factors: Improved technique and safety    Ambulation  Surface: Carpet  Device: Rolling walker  Distance: 75'  Activity: Within Unit  Activity Comments: Good safety reciprocal pattern  Additional Factors: Verbal cues  Assistance Level: Independent  Gait Deviations: Slow jefe;Decreased heel strike left;Decreased step length bilateral    Stairs  Stair Height: 6''  Device: One handrail  Number of Stairs: 4  Additional Factors: Verbal cues; Non-reciprocal going down;Reciprocal going up  Skilled Clinical Factors: Good safety    PT Exercises  A/AROM Exercises: Standing exercises: Hip abduction, Hip flexion, Hip extension, Knee flexion, Heel raises, Mini squats x 15     ASSESSMENT/PROGRESS TOWARDS GOALS:   Assessment  Assessment: Patient is meeting gait transfers and bed mobility goals at this time.  Good safety with standing HEP requires seated rest break between exercises to complete  Activity Tolerance: Patient limited by fatigue;Patient limited by pain; Patient limited by endurance  Discharge Recommendations: Continue to assess pending progress    Goals:  Long Term Goals  Long Term Goal 1: indep bed mobility  Long Term Goal 2: Pt will demonstrate transfers indep with safest AD  Long Term Goal 3: Pt will demonstrate amb >/= 150ft supervision with safest AD - indep for 30 feet  Long Term Goal 4: Pt will demonstrate stair negotiation 3 steps without rails with safest AD SBA  Long Term Goal 5: Pt will demonstrate pantoja balance assessment >/= 45/56 for decreased risk for falls. Patient Goals   Patient Goals : [place] out of chairs easier. drive a car.  stand for longer time. \"    PLAN OF CARE/Safety:   Safety Devices  Type of Devices: All fall risk precautions in place; Chair alarm in place      Therapy Time:   Individual   Time In 1100   Time Out 1200   Minutes 60     Minutes: 60  Transfer/Bed mobility training: 15  Gait trainin  Therapeutic ex: Alannah Kitchen PTA, 03/10/23 at 12:13 PM

## 2023-03-10 NOTE — PROGRESS NOTES
Warfarin Dosing - Pharmacy Consult Note  Consulting Provider: EDELMIRA Irizarry - CNP  Indication:  Atrial Fibrillation  Warfarin Dose prior to admission: 4 mg T/TH/Sat/Sun, 5 mg M/W/F   Concurrent anticoagulants/antiplatelets: ASA 81 mg  Significant Drug Interactions: No obvious interactions  Recent Labs     03/08/23  0450 03/08/23  0721 03/09/23  0359 03/10/23  0649 03/10/23  0754   INR 3.2  --  3.0 2.7  --    HGB 6.8* 7.2* 7.2*  --  8.0*     --  368  --  400     Recent warfarin administrations                     warfarin (COUMADIN) tablet 1 mg (mg) 1 mg Given 03/07/23 1748                   Date   INR    Dose  2/24       1.6        5 mg   2/25       1.9        4 mg   2/26       2.5        2 mg  2/27       2.9        2.5 mg   2/28       2.8         4 mg  3/1         3.2        HOLD  3/2         3.4        HOLD  3/3         3.4        HOLD  3/4         3.0         2 mg  3/5         3.0         2 mg  3/6         3.1        HOLD  3/7         2.8         1 mg  3/8         3.2        HOLD  3/9         3.0        HOLD  3/10       2.7         1 mg    Assessment/Plan  (Goal INR: 2 - 3)  INR of 2.7 is therapeutic. Will give 1 mg warfarin today to stabilize decline in INR from two day HOLD. Active problem list reviewed. INR orders are placed. Chart reviewed for pertinent labs, drug/diet interactions, and past doses. Documentation of patient's clinical condition was reviewed. Pharmacy Dosing:  Pharmacy will continue to follow. ALEX Baldwin Ph.  3/10/2023  8:26 AM

## 2023-03-11 LAB
HCT VFR BLD CALC: 22.3 % (ref 42–52)
HEMOGLOBIN: 7.3 G/DL (ref 14–18)
INR BLD: 2.7
MCH RBC QN AUTO: 27.9 PG (ref 27–31.3)
MCHC RBC AUTO-ENTMCNC: 32.7 % (ref 33–37)
MCV RBC AUTO: 85.2 FL (ref 79–92.2)
PDW BLD-RTO: 18.3 % (ref 11.5–14.5)
PLATELET # BLD: 370 K/UL (ref 130–400)
PROTHROMBIN TIME: 28.8 SEC (ref 12.3–14.9)
RBC # BLD: 2.62 M/UL (ref 4.7–6.1)
WBC # BLD: 7.1 K/UL (ref 4.8–10.8)

## 2023-03-11 PROCEDURE — 6370000000 HC RX 637 (ALT 250 FOR IP): Performed by: FAMILY MEDICINE

## 2023-03-11 PROCEDURE — 97116 GAIT TRAINING THERAPY: CPT

## 2023-03-11 PROCEDURE — 6370000000 HC RX 637 (ALT 250 FOR IP): Performed by: PHYSICAL MEDICINE & REHABILITATION

## 2023-03-11 PROCEDURE — 6370000000 HC RX 637 (ALT 250 FOR IP): Performed by: REGISTERED NURSE

## 2023-03-11 PROCEDURE — 97530 THERAPEUTIC ACTIVITIES: CPT

## 2023-03-11 PROCEDURE — 85027 COMPLETE CBC AUTOMATED: CPT

## 2023-03-11 PROCEDURE — 97112 NEUROMUSCULAR REEDUCATION: CPT

## 2023-03-11 PROCEDURE — 6370000000 HC RX 637 (ALT 250 FOR IP): Performed by: NURSE PRACTITIONER

## 2023-03-11 PROCEDURE — 1180000000 HC REHAB R&B

## 2023-03-11 PROCEDURE — 85610 PROTHROMBIN TIME: CPT

## 2023-03-11 PROCEDURE — 36415 COLL VENOUS BLD VENIPUNCTURE: CPT

## 2023-03-11 RX ORDER — WARFARIN SODIUM 2 MG/1
2 TABLET ORAL
Status: COMPLETED | OUTPATIENT
Start: 2023-03-11 | End: 2023-03-11

## 2023-03-11 RX ORDER — POTASSIUM CHLORIDE 750 MG/1
10 CAPSULE, EXTENDED RELEASE ORAL DAILY
Qty: 30 CAPSULE | Refills: 0 | Status: SHIPPED | OUTPATIENT
Start: 2023-03-12

## 2023-03-11 RX ADMIN — Medication 5 MG: at 21:19

## 2023-03-11 RX ADMIN — POTASSIUM CHLORIDE 10 MEQ: 750 CAPSULE, EXTENDED RELEASE ORAL at 09:23

## 2023-03-11 RX ADMIN — DOCUSATE SODIUM 100 MG: 100 CAPSULE, LIQUID FILLED ORAL at 21:19

## 2023-03-11 RX ADMIN — ACETAMINOPHEN 650 MG: 325 TABLET ORAL at 21:19

## 2023-03-11 RX ADMIN — OXYCODONE 5 MG: 5 TABLET ORAL at 09:24

## 2023-03-11 RX ADMIN — GUAIFENESIN 600 MG: 600 TABLET ORAL at 21:19

## 2023-03-11 RX ADMIN — ROSUVASTATIN CALCIUM 10 MG: 5 TABLET, FILM COATED ORAL at 09:23

## 2023-03-11 RX ADMIN — GUAIFENESIN 600 MG: 600 TABLET ORAL at 09:23

## 2023-03-11 RX ADMIN — METOPROLOL TARTRATE 25 MG: 25 TABLET, FILM COATED ORAL at 09:23

## 2023-03-11 RX ADMIN — ACETAMINOPHEN 650 MG: 325 TABLET ORAL at 09:24

## 2023-03-11 RX ADMIN — HYDROCORTISONE ACETATE: 1 CREAM TOPICAL at 09:27

## 2023-03-11 RX ADMIN — METOPROLOL TARTRATE 25 MG: 25 TABLET, FILM COATED ORAL at 21:19

## 2023-03-11 RX ADMIN — Medication 100 MG: at 09:23

## 2023-03-11 RX ADMIN — HYDROCORTISONE ACETATE: 1 CREAM TOPICAL at 21:26

## 2023-03-11 RX ADMIN — FUROSEMIDE 20 MG: 20 TABLET ORAL at 09:23

## 2023-03-11 RX ADMIN — WARFARIN SODIUM 2 MG: 2 TABLET ORAL at 17:19

## 2023-03-11 RX ADMIN — ASPIRIN 81 MG: 81 TABLET, COATED ORAL at 09:23

## 2023-03-11 RX ADMIN — Medication 2000 UNITS: at 17:19

## 2023-03-11 RX ADMIN — NALOXEGOL OXALATE 25 MG: 12.5 TABLET, FILM COATED ORAL at 09:24

## 2023-03-11 RX ADMIN — ACETAMINOPHEN 650 MG: 325 TABLET ORAL at 16:17

## 2023-03-11 ASSESSMENT — PAIN SCALES - GENERAL
PAINLEVEL_OUTOF10: 4
PAINLEVEL_OUTOF10: 0
PAINLEVEL_OUTOF10: 0

## 2023-03-11 ASSESSMENT — PAIN DESCRIPTION - DESCRIPTORS: DESCRIPTORS: ACHING

## 2023-03-11 ASSESSMENT — PAIN DESCRIPTION - LOCATION: LOCATION: SHOULDER

## 2023-03-11 ASSESSMENT — PAIN DESCRIPTION - ORIENTATION: ORIENTATION: LEFT

## 2023-03-11 NOTE — PROGRESS NOTES
Physical Therapy Rehab Treatment Note  Facility/Department: [facility]  Room: [place] [id number]       NAME: Luis Stubbs  : 1935 (82 y.o.)  MRN: [id number]  CODE STATUS: Full Code    Date of Service: 3/11/2023       Restrictions:  Restrictions/Precautions: Fall Risk  Position Activity Restriction  Other position/activity restrictions: pleurx on L side       SUBJECTIVE:   Subjective: Patient states he needs to use the bathroom. Pain  Pain: left shoulder pain, pre and post session 3-4/10, ok for tx, will notify RN    OBJECTIVE:      Pantoja score 42/56     Transfers  Surface: Standard toilet;From chair with arms  Additional Factors: Set-up  Device: Walker (ww)  Sit to Stand  Assistance Level: Independent  Skilled Clinical Factors: demonstrated quality movement and efficiency, standing on first try  Stand to Sit  Assistance Level: Independent  Skilled Clinical Factors: cont to complete safely without vc  Bed To/From Chair  Assistance Level: Independent  Car Transfer  Assistance Level: Independent  Skilled Clinical Factors: Improved technique and safety    Ambulation  Surface: Level surface  Device: Rolling walker  Distance: 100 feet  Activity: Within Unit  Activity Comments: demonstrates safety and min vc needed for posture  Gait Deviations: Slow jefe;Decreased heel strike left;Decreased step length bilateral    Stairs  Stair Height:  (nt d/t time limits)       Neuromuscular Education  NDT Treatment: Gait ;Standing  Neuromuscular Comments: repeated pantoja tasks for practice and improved reaction time overall        ASSESSMENT/PROGRESS TOWARDS GOALS:   Assessment  Assessment: Patient with improved transfers and pantoja score this session. Demonstrated improved balance after repititions of balance tasks. Patient able to amb to and from restroom and short distance within rehab unit.     Goals:  Long Term Goals  Long Term Goal 1: indep bed mobility- Met  Long Term Goal 2: Pt will demonstrate transfers indep with safest AD- Met  Long Term Goal 3: Pt will demonstrate amb >/= 150ft supervision with safest AD - indep for 30 feet- Met 50' Independent 150' Supervision  Long Term Goal 4: Pt will demonstrate stair negotiation 3 steps without rails with safest AD SBA- Utilizes rails to simulate using freezer and door jam  Long Term Goal 5: Pt will demonstrate pantoja balance assessment >/= 45/56 for decreased risk for falls. - Not met 39/56  Patient Goals   Patient Goals : \"get out of chairs easier. drive a car.  stand for longer time. \"    PLAN OF CARE/Safety:    All precautions in place      Therapy Time:   Individual   Time In 0830   Time Out 0900   Minutes 30     Minutes:30  Transfer/Bed mobility trainin  Gait training:10  Neuro re education:15  Therapeutic ex:0      Nathaniel Bradford PTA, 23 at 4:14 PM

## 2023-03-11 NOTE — PROGRESS NOTES
OCCUPATIONAL THERAPY  INPATIENT REHAB TREATMENT NOTE  [address]      NAME: Katherine Moody  : 1935 (82 y.o.)  MRN: [id number]  CODE STATUS: Full Code  Room: D380/S768-48    Date of Service: 3/11/2023    Referring Physician: Dr. Samantha Richardson Diagnosis: Impaired mobility and ADL's due to severe pulmonary debility    Restrictions  Restrictions/Precautions  Restrictions/Precautions: Fall Risk         Position Activity Restriction  Other position/activity restrictions: pleurx on L side    Patient's date of birth confirmed: Yes    SAFETY:  Safety Devices  Safety Devices in place: Yes  Type of devices: All fall risk precautions in place    SUBJECTIVE:       Pain at start of treatment: No    Pain at end of treatment: No      COGNITION:  Orientation  Overall Orientation Status: Within Normal Limits  Orientation Level: Oriented X4  Cognition  Overall Cognitive Status: WFL          OBJECTIVE:    Pt S with extended time needed to complete STSs w/c <-> tabletop then SBA/S to participate in static/dynamic standing FM/functional reach task with use of unilateral support requiring pt to weight shift, cross midline, manipulate graded resistive clips, and reach out of MENA at various heights to place/remove clips alternating between UE to improve standing balance/coordination, standing tolerance, postural strength, righting reaction, hand/digit strength, FMC, and functional reach for self-care tasks, ADLs, and functional mobility. Verbal cues given for instruction, proper hand/foot placement when facilitating STSs, sequencing, and safety.      Pt S to participate in seated UE strengthening/functional reach activity requiring pt to weight shift, cross midline, and lean/reach out of MENA at various heights/lengths to place/remove rings from dowel rods while wearing 2# wrist weight for RUE and 1# wrist weight for LUE to improve seated balance/coordination, BUE strength/endurance, ROM, FMC, and functional reach for ADLs and functional transfers. Pt required 1-2 rest breaks d/t signs of fatigue. Education:  Education  Education Given To: Patient  Education Provided: Transfer Training; Safety  Education Provided Comments: Pt educated on proper hand/foot placement when facilitating STS training from w/c, weight shifting forward to initiate stands, sequencing, and safety precautions for fall prevention. Education Method: Demonstration;Verbal  Barriers to Learning: None  Education Outcome: Demonstrated understanding;Verbalized understanding;Continued education needed      ASSESSMENT:  Assessment: Pt demonstrated good willingness to participate in session this morning. Activity Tolerance: Patient tolerated treatment well      PLAN OF CARE:  Balance training, Functional mobility training, Strengthening, ROM, Endurance training, Safety education & training, Patient/Caregiver education & training, Equipment evaluation, education, & procurement, Home management training, Self-Care / ADL, Coordination training  Continue OT POC until discharge    Patient goals : to get stronger and possibly return to driving  Time Frame for Long Term Goals : within 1.5-2wks pt will demosntrate progress in the following areas to achieve specific LTG's listed in the initial eval  Long Term Goal 1: improve overall strength/endurance for functional tasks.   Long Term Goal 2: improve independence with self care  Long Term Goal 3: improve sitting/standing balance for ADL tasks  Long Term Goal 4: improve functional mobility with LRD for safe item transport/retrieval  Long Term Goal 5: improve FMC to independently manipulate fasteners        Therapy Time:   Individual Group Co-Treat   Time In 1130       Time Out 1200         Minutes 30                   Therapeutic activities: 30 minutes     Electronically signed by:    HERMAN Reyna,   3/11/2023, 1:42 PM

## 2023-03-11 NOTE — PROGRESS NOTES
Pt to bathroom with FWW and SBA. Pt has had two bowel movements today. Wife at bedside, discharge plan discussed. Dressing to L flank CDI. No SOB noted on RA.  Electronically signed by Urvashi Reno RN on 3/11/2023 at 4:23 PM

## 2023-03-11 NOTE — PROGRESS NOTES
Progress Note    Date:3/11/2023       [facility]  Patient Name:Garth Rowe     YOB: 1935     Age:87 y.o.    Assessment        Hospital Problems             Last Modified POA    * (Principal) Impaired mobility and activities of daily living dt CP debility 2/26/2023 Yes    Mixed hyperlipidemia 2/26/2023 Yes    Gastritis without bleeding 2/26/2023 Yes    Pneumonia of both lungs due to infectious organism 2/26/2023 Yes    Acute kidney failure (HCC) 2/26/2023 Yes    Arthritis of shoulder region, left 3/6/2023 Yes    Spinal stenosis of lumbar region with neurogenic claudication 2/26/2023 Yes    Atherosclerosis of native artery of both lower extremities with intermittent claudication (HCC) 2/26/2023 Yes    Atrial fibrillation (HCC) 2/26/2023 Yes    Overview Signed 2/2/2021  3:01 PM by EDELMIRA Forrest - CNP     Past cardioversion         Venous insufficiency 2/26/2023 Yes    Balance disorder 2/26/2023 Yes       Plan:         Impaired mobility and activities of daily living due to CP debility - Dr. Chase managing.     Loculated pleural effusion in the setting of mesothelioma - ID following.  Repeat culture of effusion negative.    Paroxysmal atrial fibrillation - On Coumadin with consult to pharmacy for dosing.  Latest INR 2.7. On metoprolol.      Essential hypertension, CHF LVEF 58%, CAD, hyperlipidemia - blood pressure within acceptable range, latest blood pressure 118/66.  On aspirin, Lasix, metoprolol, and Crestor.  Euvolemic on assessment.     Arthritis - On lidocaine patch, with PRN oxycodone.     Hospital medicine managing acute needs. Patient will need to follow up with PCP for chronic disease management.    Time spent evaluating and intervening patient, 25 minutes. Greater than 70% of time spent focused exclusively on this patient, reviewing chart, reconciling medications, and answering questions and discussing treatment plan.    Subjective   Interval History Status: improved.      Patient claims that he is doing and feeling good today.  Patient denies fever, chills, dizziness, shortness of breath, chest pain, and N/V/D.  Review of Systems   12 point review of systems reviewed with patient with pertinent positive listed in HPI, otherwise, negative.  Medications   Scheduled Meds:    warfarin  2 mg Oral Once    hydrocortisone   Topical BID    docusate sodium  100 mg Oral Nightly    acetaminophen  650 mg Oral TID    potassium chloride  10 mEq Oral Daily    Vitamin D  2,000 Units Oral Dinner    cyanocobalamin  1,000 mcg IntraMUSCular Weekly    coenzyme Q10  100 mg Oral Daily    warfarin placeholder: dosing by pharmacy   Other RX Placeholder    aspirin  81 mg Oral Daily    furosemide  20 mg Oral Daily    guaiFENesin  600 mg Oral BID    melatonin  5 mg Oral Nightly    metoprolol tartrate  25 mg Oral BID    naloxegol  25 mg Oral QAM    rosuvastatin  10 mg Oral Daily     Continuous Infusions:   PRN Meds: camphor-menthol-methyl salicylate, guaiFENesin-dextromethorphan, hydrocortisone, lidocaine, polyvinyl alcohol, bisacodyl, sodium phosphate, acetaminophen, ipratropium-albuterol, ondansetron **OR** ondansetron, oxyCODONE **OR** oxyCODONE    Past History    Past Medical History:   has a past medical history of A-fib (HCC), Arthritis, Baker's cyst of knee, left, Cancer (HCC), Cerebral artery occlusion with cerebral infarction (HCC), Congestive heart failure (HCC), Coronary artery disease, HTN (hypertension), Hx of blood clots, Hyperlipidemia, Pacemaker, Polycythemia, and Torn meniscus.    Social History:   reports that he quit smoking about 54 years ago. His smoking use included cigarettes. He has a 5.00 pack-year smoking history. He has never used smokeless tobacco. He reports current alcohol use. He reports that he does not use drugs.     Family History:   Family History   Problem Relation Age of Onset    Heart Attack Mother     Other Mother          in MVA    Other Father          at age  80    Other Sister          as infant    Cancer Brother     Other Brother         unknown medical hx    Other Brother          at age 61 due to accident    Cancer Daughter         colon & lung cancer    Colon Cancer Daughter     No Known Problems Son     Colon Cancer Other     Breast Cancer Other        Physical Examination      Vitals:  /66   Pulse 72   Temp 98.1 °F (36.7 °C)   Resp 18   Ht 5' 7\" (1.702 m)   Wt 263 lb 6.4 oz (119.5 kg)   SpO2 95%   BMI 41.25 kg/m²   Temp (24hrs), Av.9 °F (36.6 °C), Min:97.7 °F (36.5 °C), Max:98.1 °F (36.7 °C)      I/O (24Hr): No intake or output data in the 24 hours ending 23 1014    Physical Exam  Constitutional:       General: He is not in acute distress. Appearance: He is obese. He is ill-appearing. HENT:      Head: Normocephalic and atraumatic. Eyes:      Conjunctiva/sclera: Conjunctivae normal.   Cardiovascular:      Rate and Rhythm: Normal rate and regular rhythm. Pulses: Normal pulses. Heart sounds: Normal heart sounds. No murmur heard. No friction rub. No gallop. Pulmonary:      Effort: Pulmonary effort is normal. No respiratory distress. Breath sounds: Normal breath sounds. No wheezing, rhonchi or rales. Abdominal:      General: Bowel sounds are normal. There is distension. Tenderness: There is no abdominal tenderness. Musculoskeletal:      Cervical back: Neck supple. Right lower le+ Edema present. Left lower le+ Edema present. Skin:     General: Skin is warm and dry. Coloration: Skin is not pale. Findings: Erythema present. Comments: Erythema posterior L hand   Neurological:      General: No focal deficit present. Mental Status: He is alert and oriented to person, place, and time. Cranial Nerves: No cranial nerve deficit. Sensory: No sensory deficit. Motor: Weakness present.    Psychiatric:         Mood and Affect: Mood normal.         Behavior: Behavior normal.         Thought Content: Thought content normal.         Judgment: Judgment normal    Labs/Imaging/Diagnostics   Labs:  CBC:  Recent Labs     03/09/23  0359 03/10/23  0754 03/11/23 0421   WBC 8.1 7.1 7.1   RBC 2.69* 2.92* 2.62*   HGB 7.2* 8.0* 7.3*   HCT 22.7* 25.0* 22.3*   MCV 84.3 85.5 85.2   RDW 18.0* 18.8* 18.3*    400 370     CHEMISTRIES:No results for input(s): NA, K, CL, CO2, BUN, CREATININE, GLUCOSE, CA, PHOS, MG in the last 72 hours. PT/INR:  Recent Labs     03/09/23  0359 03/10/23  0649 03/11/23 0421   PROTIME 31.1* 29.2* 28.8*   INR 3.0 2.7 2.7     APTT:No results for input(s): APTT in the last 72 hours. LIVER PROFILE:No results for input(s): AST, ALT, BILIDIR, BILITOT, ALKPHOS in the last 72 hours. Imaging Last 24 Hours:  No results found.     Electronically signed by EDELMIRA Mcgarry CNP on 3/11/23 at 10:14 AM EST

## 2023-03-11 NOTE — PROGRESS NOTES
Subjective: The patient complains of moderate to severe acute on chronic progressive fatigue and  PRIETO partially relieved by rest, medications, PT,  OT,   SLP and rest and exacerbated by recent illness  . Patient had initially been treated at [facility] [place] where a nerve stimulator placement was attempted but delayed due to high INR. Once the procedure was finally initiated it was aborted because of possible CSF leak. Patient became shortness of breath postprocedure but this was found to be largely unrelated to the procedure but related to a large loculated pleural effusion. Patient was transferred to [facility] for thoracotomy and VATS procedure performed by Dr. Ruthanne Meckel. rn  Signed                                                                                        Pt to bathroom with FWW and SBA. Pt has had two bowel movements today. Wife at bedside, discharge plan discussed. Dressing to L flank CDI. No SOB noted on RA. Electronically signed by Harmony Thomas RN on 3/11/2023 at 4:23 PM             ROS x10: The patient also complains of severely impaired mobility and activities of daily living. Otherwise no new problems with vision, hearing, nose, mouth, throat, dermal, cardiovascular, GI, , pulmonary, musculoskeletal, psychiatric or neurological. See also Acute Rehab PM&R H&P. Vital signs:  /66   Pulse 72   Temp 98.1 °F (36.7 °C)   Resp 18   Ht 5' 7\" (1.702 m)   Wt 263 lb 6.4 oz (119.5 kg)   SpO2 95%   BMI 41.25 kg/m²   I/O:   PO/Intake:  fair PO intake,   Reg diet    Bowel:   continent    Bladder: continent    bishop  General:  Patient is well developed,   adequately nourished, and    well kempt. HEENT:    Pupils equal, hearing intact to loud voice, external inspection of ear and nose benign. Inspection of lips, tongue and gums  thrush    Musculoskeletal: No significant change in strength or tone. All joints stable.       Inspection and palpation of digits and nails show no clubbing, cyanosis or inflammatory conditions. Neuro/Psychiatric: Affect: flat but pleasant. Alert and oriented to person, place and situation with  min cues. No significant change in deep tendon reflexes or sensation  Lungs:  Diminished, CTA-B. Respiration effort is   normal at rest.   Pleurx cath dressing D&I L Side. Heart:   S1 = S2,    irreg a fib. Abdomen:  Soft, non-tender, no enlargement of liver or spleen. Extremities:   mod lower extremity edema    Skin:   Intact to general survey,  Pleurx cath dressing D&I L Side. Rehabilitation:  Physical Therapy:   Bed mobility:  Bed mobility  Rolling to Left: Minimal assistance (02/26/23 1242)  Rolling to Right: Minimal assistance (02/26/23 1242)  Supine to Sit: Moderate assistance (02/26/23 1242)  Sit to Supine: Moderate assistance (02/26/23 1242)  Bed Mobility Comments: HOB flat; cues to avoid breath holding (02/26/23 1242)  Roll Left  Assistance Level: Modified independent (03/11/23 1608)  Skilled Clinical Factors: HOB flat with use of bed rail (03/11/23 1608)  Roll Right  Assistance Level: Modified independent (03/11/23 1608)  Skilled Clinical Factors: With use of bed rail (03/11/23 1608)  Sit to Supine  Assistance Level: Independent (03/11/23 1608)  Skilled Clinical Factors: Increased time to complete (03/06/23 1605)  Supine to Sit  Assistance Level: Independent (03/11/23 1608)  Skilled Clinical Factors: Cues for breathing throughout transfer (03/06/23 1605)  Scooting  Assistance Level: Independent (03/11/23 1608)  Transfers:  Transfers  Sit to Stand: Moderate Assistance (02/26/23 1243)  Stand to Sit: Moderate Assistance (02/26/23 1243)  Comment: poor use of L LE during sit to stand; [employer] used (02/26/23 1243)  Transfers  Surface: Standard toilet;From chair with arms (03/11/23 2660)  Additional Factors: Set-up (03/11/23 4816)  Device: Yamini Fragmin (ww) (03/11/23 7811)  Sit to Stand  Assistance Level:  Independent (03/11/23 1608)  Skilled Clinical Factors: demonstrated quality movement and efficiency, standing on first try (03/11/23 4274)  Stand to Sit  Assistance Level: Independent (03/11/23 1608)  Skilled Clinical Factors: cont to complete safely without vc (03/11/23 0305)  Bed To/From Chair  Technique: Stand step (03/09/23 1219)  Assistance Level: Independent (03/11/23 1608)  Skilled Clinical Factors: Completes with [address] (03/08/23 1613)  Car Transfer  Assistance Level: Independent (03/11/23 1608)  Skilled Clinical Factors: Improved technique and safety (03/11/23 1608)  Gait:   Ambulation  Surface: Level tile (03/02/23 1406)  Device: Rolling Walker (03/02/23 1406)  Other Apparatus: O2 (03/02/23 1406)  Assistance: Contact guard assistance;Stand by assistance (03/02/23 1406)  Quality of Gait: cues for posture and upward gaze, decreased step length and height (03/02/23 1406)  Gait Deviations: Slow Jefe; Increased MENA; Decreased step length;Decreased step height (02/26/23 1244)  Distance: 100' (03/02/23 1406)  Ambulation  Surface: Level surface (03/11/23 1608)  Device: Rolling walker (03/11/23 1608)  Distance: 100 feet (03/11/23 1608)  Activity: Within Unit (03/11/23 1608)  Activity Comments: demonstrates safety and min vc needed for posture (03/11/23 1608)  Additional Factors: Verbal cues (03/10/23 1209)  Assistance Level: Independent (03/10/23 1209)  Gait Deviations: Slow jefe;Decreased heel strike left;Decreased step length bilateral (03/11/23 1608)  Skilled Clinical Factors: Independent and good safety with short distance gait (03/09/23 1219)  Stairs:  Stairs/Curb  Stairs?: No (03/02/23 1406)  Stairs  Stair Height:  (nt d/t time limits) (03/11/23 1608)  Device: One handrail (03/10/23 1209)  Number of Stairs: 4 (03/10/23 1209)  Additional Factors: Verbal cues; Non-reciprocal going down;Reciprocal going up (03/10/23 1209)  Assistance Level: Supervision (03/09/23 1219)  Skilled Clinical Factors: Good safety (03/10/23 1209)  W/C mobility: Occupational Therapy:   Hand Dominance: Right  ADL  Feeding: Stand by assistance (02/26/23 1201)  Grooming: Minimal assistance (02/26/23 1201)  UE Bathing: Minimal assistance (02/26/23 1201)  LE Bathing: Maximum assistance (02/26/23 1201)  LE Bathing Skilled Clinical Factors: periare only per pt request (02/26/23 1201)  UE Dressing: Moderate assistance (02/26/23 1201)  LE Dressing: Maximum assistance (02/26/23 1201)  Toileting: Maximum assistance (02/26/23 1201)  Additional Comments: sponge bath ADL completed sitting EOB and patially on toilet. (02/26/23 1201)  Toilet Transfers  Toilet - Technique: Ambulating (02/26/23 1207)  Equipment Used: Grab bars (02/26/23 1207)  Toilet Transfer: Moderate assistance (02/26/23 1207)  Toilet Transfers Comments: CGA on ModA off (02/26/23 1207)          Speech Therapy:      Comprehension:  (AUditory comprehension is [facility])  Verbal Expression:  (Supervised assist required for high level naming and reasoning tasks secondary to reduced thought organization)  Diet/Swallow:           Compensatory Swallowing Strategies : Alternate solids and liquids, Eat/Feed slowly, Upright as possible for all oral intake, Small bites/sips          Lab/X-ray studies reviewed, analyzed and discussed with patient and staff:   Recent Results (from the past 24 hour(s))   Protime-INR    Collection Time: 03/11/23  4:21 AM   Result Value Ref Range    Protime 28.8 (H) 12.3 - 14.9 sec    INR 2.7    CBC    Collection Time: 03/11/23  4:21 AM   Result Value Ref Range    WBC 7.1 4.8 - 10.8 K/uL    RBC 2.62 (L) 4.70 - 6.10 M/uL    Hemoglobin 7.3 (L) 14.0 - 18.0 g/dL    Hematocrit 22.3 (L) 42.0 - 52.0 %    MCV 85.2 79.0 - 92.2 fL    MCH 27.9 27.0 - 31.3 pg    MCHC 32.7 (L) 33.0 - 37.0 %    RDW 18.3 (H) 11.5 - 14.5 %    Platelets 502 500 - 672 K/uL       3/5/23-CT  Left shoulder   Advanced osteoarthritis of the glenohumeral joint with intra-articular   ossified bodies. 2.  No acute fracture identified.        3. Calcific tendinitis versus bursitis of the rotator cuff tendon,   subacromial subdeltoid bursa. XR ABDOMEN   2/18/2023   1. Complete opacification of left hemithorax compatible with some combination of pleural effusion and atelectasis. Secondary obscuration of left heart border. 2.  Nonobstructive bowel gas pattern. No acute abdominal disease identified. 3.  Probable left renal stone. CT CHEST   2/23/2023 : Mediastinum: Thyroid is homogeneous in appearance. Vascular calcifications seen within the coronary vessels. Cardiac chambers are mildly enlarged. Pacer leads in satisfactory position. Moderate-sized pericardial effusion. Bulky adenopathy identified in the left hilar region likely reactive. Small lymph nodes seen scattered throughout the mediastinum likely reactive. Atherosclerotic disease seen diffusely throughout the thoracic aorta. Lungs/pleura: There is small chest tube identified in place on the left with small left pleural effusion. Airspace consolidation seen within the left upper and lower lung fields suggesting superimposed infiltrate such as pneumonia. Mild increased markings identified at the right lung base to suggest atelectatic change. Trace pneumothorax identified anteriorly. Upper Abdomen: Stones in the gallbladder. Small hiatal hernia. Soft Tissues/Bones: Multilevel degenerative changes seen within the spine. No acute chest wall abnormality. Indwelling chest tube identified on the left with small left pleural effusion and trace anterior pneumothorax. Consolidation seen in airspace disease throughout the left upper and lower lobes to suggest a multifocal superimposed pneumonia. Soft tissue density seen in the hilar region to suggest possible reactive adenopathy. Moderate-sized pericardial effusion. Minimal atelectatic changes seen at the right lung base. Incidental stones in the gallbladder with small hiatal hernia. XR CHEST   2/19/2023   1.  Minimal partial clearing of the left lung. The previously noted left pleural effusion is smaller   2. Stable position of the left chest tube   3. The right lung is clear. XR CHEST  2023   1. Apparent interval placement of left chest tube catheter. No pneumothorax. 2.  Persistent opacification of the left hemithorax. XR CHEST  2023   1. Near complete whiteout of the left hemithorax likely represent a combination of partial collapse of the left lung and large pleural effusion   2. Cardiomegaly   3. The right lung is grossly clear. US DUP UPPER EXTREMITY RIGHT VENOUS  2023   No evidence of DVT. FLUORO FOR SURGICAL PROCEDURES  2023  12 spot images of the lumbar spine were obtained. Intraprocedural fluoroscopic spot images as above. See separate procedure report for more information. EGD 2023  [id number] [facility] Patient: Branden Fermin MRN: [id number] : 1935 Account: Gender: Male Age: 82 Years Procedure: Upper GI endoscopy Date: 2023 Attending Physician: Criss Vick Indications:        -  Anemia        -  Melena Medications:        -  Monitored Anesthesia Care Complications:        -  No immediate complications. Estimated Blood Loss:        -  Estimated blood loss: none. Procedure:        - The Gastroscope was introduced through the mouth and advanced to the second           part of the duodenum.        -  The patient tolerated the procedure well. Findings:        -  A 2 cm hernia was found.        -  Esophagogastric landmarks were identified: the gastroesophageal junction           was found at 36 cm, the lower esophageal sphincter was found at 38 cm and the           upper extent of the gastric folds was found at 36 cm from the incisors. -  Patchy moderate inflammation characterized by erythema was found in the           gastric antrum and in the gastric body.         -  The examined duodenum was normal.        -  The cardia and gastric fundus were normal on retroflexion.        - No old or fresh blood noted Impression:        -  2 cm hernia. -  Esophagogastric landmarks identified.        -  Gastritis. -  Normal examined duodenum.        -  No specimens collected. - No old or fresh blood noted Recommendation:        -  Put patient on a clear liquid diet starting today. -  Continue present medications. -     - 80153, Esophagogastroduodenoscopy, flexible, transoral; diagnostic,           including collection of specimen(s) by brushing or washing, when performed           (separate procedure) Diagnosis Code(s):        - D64.9, Anemia, unspecified        - K92.1, Melena (includes Hematochezia)        - K44.9, Diaphragmatic hernia without obstruction or gangrene        - K29.70, Gastritis, unspecified, without bleeding           Previous extensive, complex labs, notes and diagnostics reviewed and analyzed. ALLERGIES:    Allergies as of 02/25/2023 - Fully Reviewed 02/25/2023   Allergen Reaction Noted    Benadryl [diphenhydramine hcl] Anxiety 01/31/2012    Diphenhydramine Anxiety 05/09/2018      (please also verify by checking [facility])      Complex Physical Medicine & Rehab Issues Assess & Plan:   Severe abnormality of gait and mobility and impaired self-care and ADL's secondary to progressive cardiopulmonary debility. Functional and medical status reassessed regarding patients ability to participate in therapies and patient found to be able to participate in acute intensive comprehensive inpatient rehabilitation program including PT/OT to improve balance, ambulation, ADLs, and to improve the P/AROM. Therapeutic modifications regarding activities in therapies, place, amount of time per day and intensity of therapy made daily. In bed therapies or bedside therapies prn. Bowel and Bladder dysfunction  , Neurogenic bowel and bladder:  frequent toileting, ambulate to bathroom with assistance, check post void residuals.   Check for C.difficile x1 if >2 loose stools in 24 hours, continue bowel & bladder program.  Monitor bowel and bladder function. Lactinex 2 PO every AC. MOM prn, Brown Bomb prn, Glycerin suppository prn, enema prn. Encourage therapy and nursing to co-treat and problem solve re continence. Severe back pain, lumbar, as well as generalized OA pain: reassess pain every shift and prior to and after each therapy session, give prn Tylenol and consider scheduled Tylenol, modalities prn in therapy, masage, Lidoderm, K-pad prn. Consider scheduled AM pain meds. Consult Ortho for left shoulder injection  Skin healing  ears and breakdown risk:  continue pressure relief program.  Daily skin exams and reports from nursing. Fatigue due to nutritional and hydration deficiency: Add and titrate vitamin B12 vitamin D and CoQ10 continue to monitor I&Os, calorie counts prn, dietary consult prn. Add healthy snack at night. Acute episodic insomnia with situational adjustment disorder:  prn Ambien, monitor for day time sedation. Falls risk elevated:  patient to use call light to get nursing assistance to get up, bed and chair alarm. Elevated DVT risk: progressive activities in PT, continue prophylaxis JOB hose, elevation and meds-see MAR. Complex discharge planning: Discharge March 12, 2023 home with his wife and home health care. Weekly team meeting every Monday to re-assess progress towards goals, discuss and address social, psychological and medical comorbidities and to address difficulties they may be having progressing in therapy. Patient and family education is in progress. The patient is to follow-up with their family physician after discharge.         Complex Active General Medical Issues that complicate care Assess & Plan:    Mesothelioma with dry cough-titrate O2, aerosols, follow-up with Dr. Lotus Cid, drain pleural Dex daily versus 3 times a week per protocols from pulmonology and lung surgery consult  Mixed hyperlipidemia, Atherosclerosis of native artery of both lower extremities with intermittent claudication, Atrial fibrillation, Venous insufficiency-Acute rehab to monitor heart rate and rhythm with the option of telemetry and the effects of chronotropic medication with respect to increasing physical activity and exercise in PT, OT, ADLs with medication titration to lowest effective dosing. Continue blood signs every shift focusing on heart rate, rhythm and blood pressure checks with orthostatic checks-monitoring the effect of exercise, therapy and posture. Consult hospitalist for backup medical and adjust/add medications (aspirin, Lasix, Lopressor, Crestor, Coumadin, bridging Lovenox). Monitor heart rate and blood pressure as well as medications effects on vital signs before during and after therapy with especial focus on preventing orthostasis and falls risk. Recheck CBC and BMP. Gastritis without bleeding-Elevate head of bed after meals, monitor stools for blood, lowest effective dose of PPI, consider Tums. Pneumonia of both lungs due to infectious organism-Merrem Acute rehab for endurance traing with Pulse Ox to monitoring oxygen saturation and heart rate with O2 titration to lowest effective dose. Pulse oximeter checks to shift and at HS to dose and titrate oxygen and aerosol treatments monitor for nocturnal hypoxemia, monitor vital signs, oxygen prn. Focus on energy conservation. eft a Pleurx tube in O2   drain  Progressive anemia with history of GERD-and chronic anticoagulation for cardiac issues -elevate head of bed after meals, monitor stools for blood, lowest effective dose of PPI, consider Tums. Anticoagulation coumadinization for L-lvf-ewrygtt pharmacist regarding INR management    Acute kidney failure-Eliminate toxic medications, monitor I's and O's focusing on urine output, recheck BMP.     Spinal stenosis of lumbar region with neurogenic claudication    Balance disorder-focus on balance and therapy Focus on emotional health and caregiver involvement in care.   Transition to less frequent labs now that his H&H is stable      Cont supportive care, pulmonary     Amy Bello D.O., PM&R     Attending    [facility]

## 2023-03-11 NOTE — PROGRESS NOTES
Pt resting quietly tonight. CPAP on with nocturnal pulse ox. Pt medicated with tylenol for 4/10 pain in left shoulder. 2+ edema in lower extremities. Electronically signed by Emil Ames.  Jalyn Red on 3/11/2023 at 2:01 AM

## 2023-03-12 ENCOUNTER — APPOINTMENT (OUTPATIENT)
Dept: ULTRASOUND IMAGING | Age: 88
DRG: 947 | End: 2023-03-12
Attending: PHYSICAL MEDICINE & REHABILITATION
Payer: MEDICARE

## 2023-03-12 LAB
ABO/RH: NORMAL
ALBUMIN SERPL-MCNC: 2.7 G/DL (ref 3.5–4.6)
ALP BLD-CCNC: 75 U/L (ref 35–104)
ALT SERPL-CCNC: 35 U/L (ref 0–41)
ANION GAP SERPL CALCULATED.3IONS-SCNC: 10 MEQ/L (ref 9–15)
ANTIBODY SCREEN: NORMAL
AST SERPL-CCNC: 21 U/L (ref 0–40)
BASOPHILS ABSOLUTE: 0 K/UL (ref 0–0.2)
BASOPHILS RELATIVE PERCENT: 0.3 %
BILIRUB SERPL-MCNC: <0.2 MG/DL (ref 0.2–0.7)
BILIRUBIN DIRECT: <0.2 MG/DL (ref 0–0.4)
BILIRUBIN, INDIRECT: ABNORMAL MG/DL (ref 0–0.6)
BUN BLDV-MCNC: 21 MG/DL (ref 8–23)
CALCIUM SERPL-MCNC: 8.6 MG/DL (ref 8.5–9.9)
CHLORIDE BLD-SCNC: 106 MEQ/L (ref 95–107)
CO2: 25 MEQ/L (ref 20–31)
CREAT SERPL-MCNC: 1.14 MG/DL (ref 0.7–1.2)
EOSINOPHILS ABSOLUTE: 0.3 K/UL (ref 0–0.7)
EOSINOPHILS RELATIVE PERCENT: 4.7 %
GFR SERPL CREATININE-BSD FRML MDRD: >60 ML/MIN/{1.73_M2}
GLUCOSE BLD-MCNC: 102 MG/DL (ref 70–99)
HCT VFR BLD CALC: 24.2 % (ref 42–52)
HEMOGLOBIN: 7.8 G/DL (ref 14–18)
INR BLD: 2.6
LYMPHOCYTES ABSOLUTE: 0.7 K/UL (ref 1–4.8)
LYMPHOCYTES RELATIVE PERCENT: 9.7 %
MCH RBC QN AUTO: 27.3 PG (ref 27–31.3)
MCHC RBC AUTO-ENTMCNC: 32.1 % (ref 33–37)
MCV RBC AUTO: 85.2 FL (ref 79–92.2)
MONOCYTES ABSOLUTE: 0.8 K/UL (ref 0.2–0.8)
MONOCYTES RELATIVE PERCENT: 11.4 %
NEUTROPHILS ABSOLUTE: 5.1 K/UL (ref 1.4–6.5)
NEUTROPHILS RELATIVE PERCENT: 73.9 %
PDW BLD-RTO: 19.1 % (ref 11.5–14.5)
PLATELET # BLD: 353 K/UL (ref 130–400)
POTASSIUM SERPL-SCNC: 4.3 MEQ/L (ref 3.4–4.9)
PROTHROMBIN TIME: 28.1 SEC (ref 12.3–14.9)
RBC # BLD: 2.84 M/UL (ref 4.7–6.1)
SODIUM BLD-SCNC: 141 MEQ/L (ref 135–144)
TOTAL PROTEIN: 5.4 G/DL (ref 6.3–8)
WBC # BLD: 6.9 K/UL (ref 4.8–10.8)

## 2023-03-12 PROCEDURE — 6370000000 HC RX 637 (ALT 250 FOR IP): Performed by: FAMILY MEDICINE

## 2023-03-12 PROCEDURE — 76705 ECHO EXAM OF ABDOMEN: CPT

## 2023-03-12 PROCEDURE — 97110 THERAPEUTIC EXERCISES: CPT

## 2023-03-12 PROCEDURE — 80048 BASIC METABOLIC PNL TOTAL CA: CPT

## 2023-03-12 PROCEDURE — 80076 HEPATIC FUNCTION PANEL: CPT

## 2023-03-12 PROCEDURE — 36415 COLL VENOUS BLD VENIPUNCTURE: CPT

## 2023-03-12 PROCEDURE — 97116 GAIT TRAINING THERAPY: CPT

## 2023-03-12 PROCEDURE — 6370000000 HC RX 637 (ALT 250 FOR IP): Performed by: REGISTERED NURSE

## 2023-03-12 PROCEDURE — 1180000000 HC REHAB R&B

## 2023-03-12 PROCEDURE — 85610 PROTHROMBIN TIME: CPT

## 2023-03-12 PROCEDURE — 86901 BLOOD TYPING SEROLOGIC RH(D): CPT

## 2023-03-12 PROCEDURE — 86850 RBC ANTIBODY SCREEN: CPT

## 2023-03-12 PROCEDURE — 86900 BLOOD TYPING SEROLOGIC ABO: CPT

## 2023-03-12 PROCEDURE — 6370000000 HC RX 637 (ALT 250 FOR IP): Performed by: PHYSICAL MEDICINE & REHABILITATION

## 2023-03-12 PROCEDURE — 6370000000 HC RX 637 (ALT 250 FOR IP): Performed by: NURSE PRACTITIONER

## 2023-03-12 PROCEDURE — 97535 SELF CARE MNGMENT TRAINING: CPT

## 2023-03-12 PROCEDURE — 85025 COMPLETE CBC W/AUTO DIFF WBC: CPT

## 2023-03-12 PROCEDURE — 86923 COMPATIBILITY TEST ELECTRIC: CPT

## 2023-03-12 RX ORDER — FUROSEMIDE 10 MG/ML
20 INJECTION INTRAMUSCULAR; INTRAVENOUS SEE ADMIN INSTRUCTIONS
Status: DISCONTINUED | OUTPATIENT
Start: 2023-03-12 | End: 2023-03-13 | Stop reason: HOSPADM

## 2023-03-12 RX ORDER — SODIUM CHLORIDE 9 MG/ML
INJECTION, SOLUTION INTRAVENOUS PRN
Status: DISCONTINUED | OUTPATIENT
Start: 2023-03-12 | End: 2023-03-13 | Stop reason: HOSPADM

## 2023-03-12 RX ORDER — WARFARIN SODIUM 2 MG/1
2 TABLET ORAL
Status: COMPLETED | OUTPATIENT
Start: 2023-03-12 | End: 2023-03-12

## 2023-03-12 RX ORDER — ACETAMINOPHEN 325 MG/1
650 TABLET ORAL SEE ADMIN INSTRUCTIONS
Status: DISCONTINUED | OUTPATIENT
Start: 2023-03-12 | End: 2023-03-13 | Stop reason: HOSPADM

## 2023-03-12 RX ADMIN — NALOXEGOL OXALATE 25 MG: 12.5 TABLET, FILM COATED ORAL at 08:41

## 2023-03-12 RX ADMIN — HYDROCORTISONE ACETATE: 1 CREAM TOPICAL at 08:42

## 2023-03-12 RX ADMIN — Medication 100 MG: at 08:41

## 2023-03-12 RX ADMIN — ACETAMINOPHEN 650 MG: 325 TABLET ORAL at 14:11

## 2023-03-12 RX ADMIN — FUROSEMIDE 20 MG: 20 TABLET ORAL at 08:40

## 2023-03-12 RX ADMIN — Medication 5 MG: at 21:40

## 2023-03-12 RX ADMIN — ACETAMINOPHEN 650 MG: 325 TABLET ORAL at 08:41

## 2023-03-12 RX ADMIN — ACETAMINOPHEN 650 MG: 325 TABLET ORAL at 21:40

## 2023-03-12 RX ADMIN — POTASSIUM CHLORIDE 10 MEQ: 750 CAPSULE, EXTENDED RELEASE ORAL at 08:40

## 2023-03-12 RX ADMIN — DOCUSATE SODIUM 100 MG: 100 CAPSULE, LIQUID FILLED ORAL at 21:40

## 2023-03-12 RX ADMIN — ROSUVASTATIN CALCIUM 10 MG: 5 TABLET, FILM COATED ORAL at 08:40

## 2023-03-12 RX ADMIN — GUAIFENESIN 600 MG: 600 TABLET ORAL at 08:41

## 2023-03-12 RX ADMIN — Medication 2000 UNITS: at 18:22

## 2023-03-12 RX ADMIN — METOPROLOL TARTRATE 25 MG: 25 TABLET, FILM COATED ORAL at 08:40

## 2023-03-12 RX ADMIN — ASPIRIN 81 MG: 81 TABLET, COATED ORAL at 08:41

## 2023-03-12 RX ADMIN — METOPROLOL TARTRATE 25 MG: 25 TABLET, FILM COATED ORAL at 21:40

## 2023-03-12 RX ADMIN — WARFARIN SODIUM 1 MG: 2 TABLET ORAL at 18:22

## 2023-03-12 RX ADMIN — HYDROCORTISONE ACETATE: 1 CREAM TOPICAL at 21:40

## 2023-03-12 RX ADMIN — GUAIFENESIN 600 MG: 600 TABLET ORAL at 21:40

## 2023-03-12 ASSESSMENT — PAIN SCALES - GENERAL: PAINLEVEL_OUTOF10: 0

## 2023-03-12 NOTE — CARE COORDINATION
Spoke to patient regarding discharge home change from today to tomorrow. Explained that the doctor ordered a test to make sure there isn't a problem before he is sent home. We want him to be safe and not have to return to the hospital. He stated, \"Okay. I am okay going home tomorrow. I am a little tired and don't feel all that well. \" Asked if he wanted me to call his wife and he stated, \"No. I will. \" Jairo Hazelas his phone within easy reach on bedside table. Also provided him a envelope with the phone number to call Medical Services when he does get home to have his home O2 delivered ([phone]).  Envelope placed in a bag with his clothes per his request.   Electronically signed by Stiven Campos RN on 3/12/23 at 10:28 AM EDT

## 2023-03-12 NOTE — PROGRESS NOTES
Physical Therapy Rehab Treatment Note  Facility/Department: [facility]  Room: B497/X967-33       NAME: Charla Santizo  : 1935 (82 y.o.)  MRN: [id number]  CODE STATUS: Full Code    Date of Service: 3/12/2023  Chart Reviewed: Yes    Restrictions:  Restrictions/Precautions: Fall Risk  Position Activity Restriction  Other position/activity restrictions: pleurx on L side       SUBJECTIVE:   Subjective: Patient reports having US done of abdomen this morning. Pain  Pain: Denies. OBJECTIVE:             Bed mobility  Supine to Sit: Modified independent    Transfers  Sit to Stand: Modified independent  Stand to Sit: Modified independent    Ambulation  Surface: Level tile  Device: Rolling Walker  Assistance: Supervision  Quality of Gait: Downward gaze, decreased Juanito Heel strike Lt < Rt, no LOB  Distance: 120ft x2    Stairs/Curb  Stairs?: Yes  Stairs  # Steps : 4  Stairs Height: 6\"  Rails: Right ascending  Assistance: Stand by assistance  Comment: Non reciprocal pattern              PT Exercises  Pressure Relief Exercises: STS from chair x10  Static Standing Balance Exercises: Reaching within MENA without LOB                        ASSESSMENT/PROGRESS TOWARDS GOALS:   Body Structures, Functions, Activity Limitations Requiring Skilled Therapeutic Intervention: Decreased functional mobility ; Decreased safe awareness;Decreased strength;Decreased endurance;Decreased balance;Decreased posture  Assessment: Patient with good tolerance to session, mild fatigue after completing one set of stairs declining to complete one more time. No  LOB with static standing reaching unsupported. Patient to be discharged today or tomorrow, pending test results.     Goals:  Long Term Goals  Long Term Goal 1: indep bed mobility- Met  Long Term Goal 2: Pt will demonstrate transfers indep with safest AD- Met  Long Term Goal 3: Pt will demonstrate amb >/= 150ft supervision with safest AD - indep for 30 feet- Met 50' Independent 150' Supervision  Long Term Goal 4: Pt will demonstrate stair negotiation 3 steps without rails with safest AD SBA- Utilizes rails to simulate using freezer and door jam  Long Term Goal 5: Pt will demonstrate pantoja balance assessment >/= 45/56 for decreased risk for falls.- Not met 39/56  Patient Goals   Patient Goals : \"get out of chairs easier.  drive a car.  stand for longer time.\"    PLAN OF CARE/Safety:   Safety Devices  Type of Devices: All fall risk precautions in place;Chair alarm in place      Therapy Time:   Individual   Time In 1130   Time Out 1200   Minutes 30     Minutes:30  Transfer/Bed mobility training:10  Gait training:10  Neuro re education:  Therapeutic ex:10      Linette Bravo PTA, 03/12/23 at 12:45 PM       Electronically signed by Linette Bravo PTA on 3/12/2023 at 12:46 PM

## 2023-03-12 NOTE — FLOWSHEET NOTE
Assisted pt back to bed from bathroom, pt had attempted to open his bowels but was unable to.        03/12/23 1902   Whiteboard info   Activity Return to bed   Are you deaf or do you have serious difficulty hearing? Yes   Are you blind or do you have serious difficulty seeing, even when wearing glasses? No   Precautions   Precautions Fall risk   Negative Pressure Room No   Positive Pressure Room No   Safe Environment   Arm Bands On ID; Allergies; Fall   Patient has limb restriction? No   Overbed Table Within Reach Yes   Safety Measures Bed/Chair alarm on;Bed/Chair-Wheels locked; Bed in low position;Call light within reach; Fall prevention (comment);Gripper socks; Side rails X 2;Standard Safety Measures   Video monitor in use N/A   Fall Risk Interventions   Nursing Judgement-Fall Risk High(Add Comments) Yes   Toilet Every 2 Hours-In Advance of Need Yes   Hourly Visual Checks Awake   Fall Visual Posted Armband; Fall sign posted   Room Door Open Yes   Alarm On Bed   Patient Moved Closer to [address] No

## 2023-03-12 NOTE — PROGRESS NOTES
OCCUPATIONAL THERAPY  INPATIENT REHAB TREATMENT NOTE  [address]      NAME: Margo Donis  : 1935 (82 y.o.)  MRN: [id number]  CODE STATUS: Full Code  Room: Z530/E496-75    Date of Service: 3/12/2023    Referring Physician: Dr. Suzanne Heimlich Diagnosis: Impaired mobility and ADL's due to severe pulmonary debility    Restrictions  Restrictions/Precautions  Restrictions/Precautions: Fall Risk         Position Activity Restriction  Other position/activity restrictions: pleurex on L side    Patient's date of birth confirmed: Yes    SAFETY:    All falls risk precautions in place    SUBJECTIVE:    \"I hope I get out of here soon\"    Pain at start of treatment: No    Pain at end of treatment: No    Location:   Description   Nursing notified: Not Applicable  RN:   Intervention: None    COGNITION:    WFL    OBJECTIVE:  Family training completed with pt and family for preparing for showering with pleurex. Educated pt and spouse on positioning of tape and techniques to protect dressing. Spouse receptive and pt and spouse asked appropriate questions, which were answered to their satisfaction. Educated on where to purchase additional supplies for covering. Family and pt report understanding and no further concerns. Education:    Shower dressing covering    Equipment recommendations:    ASSESSMENT:    G attention and understanding demo'd by pt and spouse to education.       PLAN OF CARE:  Balance training, Functional mobility training, Strengthening, ROM, Endurance training, Safety education & training, Patient/Caregiver education & training, Equipment evaluation, education, & procurement, Home management training, Self-Care / ADL, Coordination training  Continue OT POC until discharge    Patient goals : to get stronger and possibly return to driving  Time Frame for Long Term Goals : within 1.5-2wks pt will demosntrate progress in the following areas to achieve specific LTG's listed in the initial eval  Long Term Goal 1: improve overall strength/endurance for functional tasks. Long Term Goal 2: improve independence with self care  Long Term Goal 3: improve sitting/standing balance for ADL tasks  Long Term Goal 4: improve functional mobility with LRD for safe item transport/retrieval  Long Term Goal 5: improve FMC to independently manipulate fasteners        Therapy Time:   Individual Group Co-Treat   Time In 1314       Time Out 1322         Minutes 8         ADL/IADL trainin minutes     Electronically signed by:     VIRGINIA Ruvalcaba/L,   3/12/2023, 1:25 PM

## 2023-03-12 NOTE — PROGRESS NOTES
Ultrasound staff called by unit secretary RE: STAT order placed last night. Electronically signed by Daryle Poncho, RN on 3/12/2023 at 9:32 AM     Dr. Nata Osorio re-consulted for warfarin dosing, in to see pt and orders received. Dr. Francia Arteaga in to see pt, Hem/Onc consulted, orders received. Phlebotomist in to draw type and screen. IV access obtained by RN in RAC, 20 gauge saline lock with GBR and flushes well. Pt and family aware of plan of care.  Electronically signed by Daryle Poncho, RN on 3/12/2023 at 6:52 PM

## 2023-03-12 NOTE — CONSULTS
[facility] Heart Consult Note      Patient:  Sabas Duval  YOB: 1935  MRN: [id number]   Acct: [de-identified]  Admit Date:  2/25/2023  Primary Cardiologist: Stepan Todd MD    Reason for Consult: Atrial fib. Anticoagulation    Rounding Cardiologist: Cinda Kelley MD      Impression/Plan:     Paroxysmal A.fib: asymptomatic. Anticoagulation: should go home on 1mg daily. Has home monitoring goal is 2-3. He has no bleeding. Indication: a.fib  Pericardial effusion. No sob/no elevated jvd. Since here tomorrow, repeat echo am to assure stable. CAD: stable  OK for home tomorrow. Chief Complaint/Active Symptoms: 43-year-old gentleman who had had pneumonia complicated by empyema requiring chest tube drainage. Also at that time had probable sympathetic pericardial effusion without evidence of tamponade. He was hospitalized at [facility] last month and then transferred to rehab for strengthening. Today he feels well in fact is going to be going home tomorrow. He denies being short of breath. He has no chest pain. He is unaware of his irregularity of his heart. Consultation was requested to determine dose of Coumadin follow-up. Patient checks his INR is at home on his self testing machine. He is on warfarin long-term because of atrial fibrillation and high risk of thromboembolic phenomenon. He has mild lower extremity edema somewhat improved. He has no chest tightness pleuritic or otherwise. He has no PND. INR therapuetic. Has had 2mg yest, 1 mg prior to that, none two days before, 1mg 3 days before. In past week, total 6 mg. Review of Systems:   He is fatigued    2/18/2023 chest tube placement, tunneled the Pleurx catheter  Found to have exudative pleural effusion. Staph was grown from pleural fluid. Was negative for malignancy    Moderate pericardial effusion 2/23/2023 with repeat echo demonstrating no significant change some pericardial thickening identified. Normal RV to LV with 60% ejection fraction. Normal valvular structure and function. 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    Cerebral artery occlusion with cerebral infarction ([facility] [address] 2000    TIA sx felt funny --     Congestive heart failure ([facility] [address] 03/08/2022    Coronary artery disease     HTN (hypertension)     meds > 20 yrs    Hx of blood clots 2012    DVT left leg     Hyperlipidemia     meds > 20 yrs    Pacemaker 07/19/2022    Polycythemia     Torn meniscus     left knee   Sleep apnea      Objective:   Vitals:    03/11/23 0923 03/11/23 2119 03/11/23 2122 03/12/23 0735   BP:  130/69 130/69 119/62   Pulse: 72 66 66 62   Resp: 18  18 17   Temp:   97.9 °F (36.6 °C) 98.1 °F (36.7 °C)   TempSrc:   Oral Oral   SpO2:   94% 97%   Weight:       Height:          Wt Readings from Last 3 Encounters:   03/06/23 263 lb 6.4 oz (119.5 kg)   02/25/23 243 lb (110.2 kg)   02/16/23 264 lb (119.7 kg)         Physical Exam:  General Appearance: alert and oriented to person, place and time, in no acute distress  Cardiovascular: normal rate, slightly irregular rhythm, normal S1 and S2, no murmurs, rubs, clicks, or gallops,  no JVD  Pulmonary/Chest: clear to auscultation bilaterally- no wheezes, rales or rhonchi, normal air movement, no respiratory distress somewhat decreased breath sounds in the bases  Abdomen: soft, non-tender, non-distended, normal bowel sounds, no masses obese  Extremities: no cyanosis, clubbing.   2+ edema  Skin: warm and dry, no rash or erythema  Eyes: EOMI  Neck: supple and non-tender without mass, no thyromegaly   Neurological: alert, oriented, normal speech, no focal findings or movement disorder noted    Allergies: Allergies   Allergen Reactions    Benadryl [Diphenhydramine Hcl] Anxiety    Diphenhydramine Anxiety     anxious        Medications:    warfarin  2 mg Oral Once    hydrocortisone   Topical BID    docusate sodium  100 mg Oral Nightly    acetaminophen  650 mg Oral TID    potassium chloride  10 mEq Oral Daily    Vitamin D  2,000 Units Oral Dinner    cyanocobalamin  1,000 mcg IntraMUSCular Weekly    coenzyme Q10  100 mg Oral Daily    warfarin placeholder: dosing by pharmacy   Other RX Placeholder    aspirin  81 mg Oral Daily    furosemide  20 mg Oral Daily    guaiFENesin  600 mg Oral BID    melatonin  5 mg Oral Nightly    metoprolol tartrate  25 mg Oral BID    naloxegol  25 mg Oral QAM    rosuvastatin  10 mg Oral Daily       camphor-menthol-methyl salicylate, , TID PRN  guaiFENesin-dextromethorphan, 10 mL, Q4H PRN  hydrocortisone, 25 mg, BID PRN  lidocaine, 3 patch, Daily PRN  polyvinyl alcohol, 1 drop, BID PRN  bisacodyl, 10 mg, Daily PRN  sodium phosphate, 1 enema, Daily PRN  acetaminophen, 650 mg, Q4H PRN  ipratropium-albuterol, 1 ampule, Q4H PRN  ondansetron, 4 mg, Q8H PRN   Or  ondansetron, 4 mg, Q6H PRN  oxyCODONE, 5 mg, Q4H PRN   Or  oxyCODONE, 10 mg, Q4H PRN        Diagnostics:  EKG:  Sinus bradycardiia    Echo:     CXR: 2-view: No results found for this or any previous visit. Portable: Results for orders placed during the hospital encounter of 02/18/23    XR CHEST PORTABLE    Narrative  EXAMINATION:  ONE XRAY VIEW OF THE CHEST    2/19/2023 7:51 am    COMPARISON:  02/18/2023    HISTORY:  ORDERING SYSTEM PROVIDED HISTORY: pleural effusion  TECHNOLOGIST PROVIDED HISTORY:  Reason for exam:->pleural effusion  What reading provider will be dictating this exam?->CRC    FINDINGS:  Note is made of a left chest tube. There is no left pneumothorax. There is  been partial clearing of the previously noted left pleural effusion. Significant residual airspace disease as well as a residual pleural effusion  is noted    The right lung is clear. There is a dual lead cardiac pacer on the left. Impression  1. Minimal partial clearing of the left lung. The previously noted left  pleural effusion is smaller  2. Stable position of the left chest tube  3. The right lung is clear. Lab Data:    Cardiac Enzymes:  No results for input(s): CKTOTAL, CKMB, CKMBINDEX, TROPONINI in the last 72 hours. ProBNP: No results found for: PROBNP    CBC:   Recent Labs     03/10/23  0754 03/11/23 0421 03/12/23 0454   WBC 7.1 7.1 6.9   RBC 2.92* 2.62* 2.84*   HGB 8.0* 7.3* 7.8*   HCT 25.0* 22.3* 24.2*    370 353       CMP:    Recent Labs     03/12/23 0454      K 4.3      CO2 25   BUN 21   CREATININE 1.14   LABGLOM >60.0   GLUCOSE 102*   CALCIUM 8.6       Hepatic Function Panel:    Recent Labs     03/12/23 0454   ALKPHOS 75   ALT 35   AST 21   PROT 5.4*   BILITOT <0.2   BILIDIR <0.2   IBILI see below   LABALBU 2.7*       Magnesium:  No results for input(s): MG in the last 72 hours. PT/INR:    Recent Labs     03/10/23  0649 03/11/23 0421 03/12/23 0454   PROTIME 29.2* 28.8* 28.1*   INR 2.7 2.7 2.6       Lipids:  No results for input(s): CHOL, TRIG, HDL, LDLCALC, LABVLDL in the last 72 hours.     Principal Problem:    Impaired mobility and activities of daily living dt CP debility  Active Problems:    Mixed hyperlipidemia    Gastritis without bleeding    Pneumonia of both lungs due to infectious organism    Acute kidney failure (HCC)    Arthritis of shoulder region, left    Spinal stenosis of lumbar region with neurogenic claudication    Atherosclerosis of native artery of both lower extremities with intermittent claudication (HCC)    Atrial fibrillation (HCC)    Venous insufficiency    Balance disorder  Resolved Problems:    Impaired mobility and ADLs           Electronically signed by Abbey Saldivar MD on 3/12/2023 at 1:14 PM

## 2023-03-12 NOTE — PROGRESS NOTES
Physical Therapy Missed Treatment   Facility/Department: [facility] MED SURG R175/N727-98    NAME: Walter Amado    : 1935 ([place] 5 y.o.)  MRN: [id number]    Account: [de-identified]  Gender: male    Chart reviewed, attempted PT at 1. Patient unavailable 2° to:    [x] Patient with transport for US. Will attempt PT treatment again at earliest convenience.       Electronically signed by Gala Monsalve PTA on 3/12/23 at 10:24 AM EDT

## 2023-03-13 VITALS
TEMPERATURE: 98.1 F | WEIGHT: 248.7 LBS | HEART RATE: 67 BPM | BODY MASS INDEX: 39.03 KG/M2 | DIASTOLIC BLOOD PRESSURE: 61 MMHG | OXYGEN SATURATION: 98 % | RESPIRATION RATE: 16 BRPM | SYSTOLIC BLOOD PRESSURE: 120 MMHG | HEIGHT: 67 IN

## 2023-03-13 LAB
HCT VFR BLD CALC: 24.1 % (ref 42–52)
HEMOGLOBIN: 7.6 G/DL (ref 14–18)
INR BLD: 2.6
LV EF: 33 %
LVEF MODALITY: NORMAL
PROTHROMBIN TIME: 27.6 SEC (ref 12.3–14.9)

## 2023-03-13 PROCEDURE — 2500000003 HC RX 250 WO HCPCS: Performed by: PHYSICAL MEDICINE & REHABILITATION

## 2023-03-13 PROCEDURE — 85018 HEMOGLOBIN: CPT

## 2023-03-13 PROCEDURE — 99239 HOSP IP/OBS DSCHRG MGMT >30: CPT | Performed by: PHYSICAL MEDICINE & REHABILITATION

## 2023-03-13 PROCEDURE — 97535 SELF CARE MNGMENT TRAINING: CPT

## 2023-03-13 PROCEDURE — 6370000000 HC RX 637 (ALT 250 FOR IP): Performed by: PHYSICAL MEDICINE & REHABILITATION

## 2023-03-13 PROCEDURE — 85014 HEMATOCRIT: CPT

## 2023-03-13 PROCEDURE — 6370000000 HC RX 637 (ALT 250 FOR IP): Performed by: REGISTERED NURSE

## 2023-03-13 PROCEDURE — 85610 PROTHROMBIN TIME: CPT

## 2023-03-13 PROCEDURE — 6370000000 HC RX 637 (ALT 250 FOR IP): Performed by: FAMILY MEDICINE

## 2023-03-13 PROCEDURE — 36415 COLL VENOUS BLD VENIPUNCTURE: CPT

## 2023-03-13 PROCEDURE — 93320 DOPPLER ECHO COMPLETE: CPT

## 2023-03-13 PROCEDURE — 6370000000 HC RX 637 (ALT 250 FOR IP): Performed by: INTERNAL MEDICINE

## 2023-03-13 PROCEDURE — 93308 TTE F-UP OR LMTD: CPT

## 2023-03-13 PROCEDURE — 6360000002 HC RX W HCPCS: Performed by: INTERNAL MEDICINE

## 2023-03-13 RX ORDER — WARFARIN SODIUM 2 MG/1
TABLET ORAL
Qty: 30 TABLET | Refills: 0 | Status: SHIPPED | OUTPATIENT
Start: 2023-03-13 | End: 2023-03-13 | Stop reason: SDUPTHER

## 2023-03-13 RX ORDER — WARFARIN SODIUM 1 MG/1
TABLET ORAL
Qty: 30 TABLET | Refills: 0 | Status: SHIPPED | OUTPATIENT
Start: 2023-03-13

## 2023-03-13 RX ORDER — FOLIC ACID 1 MG/1
1 TABLET ORAL DAILY
Status: DISCONTINUED | OUTPATIENT
Start: 2023-03-13 | End: 2023-03-13 | Stop reason: HOSPADM

## 2023-03-13 RX ORDER — FERROUS SULFATE 325(65) MG
325 TABLET ORAL 2 TIMES DAILY WITH MEALS
Status: DISCONTINUED | OUTPATIENT
Start: 2023-03-14 | End: 2023-03-13 | Stop reason: HOSPADM

## 2023-03-13 RX ORDER — FERROUS SULFATE 325(65) MG
325 TABLET ORAL 2 TIMES DAILY WITH MEALS
Qty: 30 TABLET | Refills: 3 | Status: SHIPPED | OUTPATIENT
Start: 2023-03-14 | End: 2023-03-29

## 2023-03-13 RX ORDER — FOLIC ACID 1 MG/1
1 TABLET ORAL DAILY
Qty: 30 TABLET | Refills: 3 | Status: SHIPPED | OUTPATIENT
Start: 2023-03-13

## 2023-03-13 RX ORDER — WARFARIN SODIUM 2 MG/1
1 TABLET ORAL
Status: DISCONTINUED | OUTPATIENT
Start: 2023-03-13 | End: 2023-03-13 | Stop reason: HOSPADM

## 2023-03-13 RX ADMIN — ACETAMINOPHEN 650 MG: 325 TABLET ORAL at 14:44

## 2023-03-13 RX ADMIN — IRON SUCROSE 200 MG: 20 INJECTION, SOLUTION INTRAVENOUS at 13:45

## 2023-03-13 RX ADMIN — ASPIRIN 81 MG: 81 TABLET, COATED ORAL at 08:12

## 2023-03-13 RX ADMIN — ACETAMINOPHEN 650 MG: 325 TABLET ORAL at 08:11

## 2023-03-13 RX ADMIN — MICONAZOLE NITRATE: 2 POWDER TOPICAL at 13:44

## 2023-03-13 RX ADMIN — FOLIC ACID 1 MG: 1 TABLET ORAL at 13:41

## 2023-03-13 RX ADMIN — FUROSEMIDE 20 MG: 20 TABLET ORAL at 08:49

## 2023-03-13 RX ADMIN — NALOXEGOL OXALATE 25 MG: 12.5 TABLET, FILM COATED ORAL at 08:13

## 2023-03-13 RX ADMIN — METOPROLOL TARTRATE 25 MG: 25 TABLET, FILM COATED ORAL at 08:11

## 2023-03-13 RX ADMIN — ROSUVASTATIN CALCIUM 10 MG: 5 TABLET, FILM COATED ORAL at 08:10

## 2023-03-13 RX ADMIN — POTASSIUM CHLORIDE 10 MEQ: 750 CAPSULE, EXTENDED RELEASE ORAL at 08:12

## 2023-03-13 RX ADMIN — Medication 100 MG: at 08:11

## 2023-03-13 RX ADMIN — GUAIFENESIN 600 MG: 600 TABLET ORAL at 08:12

## 2023-03-13 ASSESSMENT — PAIN DESCRIPTION - DESCRIPTORS
DESCRIPTORS: ACHING
DESCRIPTORS: ACHING

## 2023-03-13 ASSESSMENT — PAIN DESCRIPTION - ORIENTATION
ORIENTATION: LEFT
ORIENTATION: LEFT

## 2023-03-13 ASSESSMENT — PAIN SCALES - GENERAL
PAINLEVEL_OUTOF10: 3
PAINLEVEL_OUTOF10: 3
PAINLEVEL_OUTOF10: 2

## 2023-03-13 ASSESSMENT — PAIN DESCRIPTION - LOCATION
LOCATION: SHOULDER
LOCATION: SHOULDER

## 2023-03-13 ASSESSMENT — PAIN - FUNCTIONAL ASSESSMENT: PAIN_FUNCTIONAL_ASSESSMENT: ACTIVITIES ARE NOT PREVENTED

## 2023-03-13 NOTE — PROGRESS NOTES
Physical Therapy Missed Treatment   Facility/Department: [facility] N283/A904-26    NAME: Chaz Browne    : 1935 (82 y.o.)  MRN: [id number]    Account: [de-identified]  Gender: male      Patient adamantly declines scheduled therapy session at 1000 secondary to patient wanting to rest prior to discharge. Missed 30 minutes of scheduled therapy.     Wali Win, COLE, 23 at 12:15 PM

## 2023-03-13 NOTE — DISCHARGE SUMMARY
Subjective: The patient complains of moderate to severe acute on chronic progressive fatigue and  PRIETO partially relieved by rest, medications, PT,  OT,   SLP and rest and exacerbated by recent illness  . Patient had initially been treated at [facility] [place] where a nerve stimulator placement was attempted but delayed due to high INR. Once the procedure was finally initiated it was aborted because of possible CSF leak. Patient became shortness of breath postprocedure but this was found to be largely unrelated to the procedure but related to a large loculated pleural effusion. Patient was transferred to [facility] for thoracotomy and VATS procedure performed by Dr. Nurys Fowler. As noted patient had a chest tube inserted by Dr. Nurys Fowler on 2/18/2023. He has been followed by thoracic surgery pulmonology oncology GI and cardiology as well as the hospitalist.     From a thoracic surgery standpoint cultures were sent from his pleural effusion which were positive and he is now on IV antibiotics. The left a Pleurx tube in O2   drain 3 times a week. The plan is for outpatient removal of the drain and if any worsening symptoms occur after the Pleurx is removed possible VATS surgery. They are trying to avoid surgery for now. Pulmonology followed him advising titrating O2 to keep sats above 90 continue home CPAP and continue draining the Pleurx daily. 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 He they noted his symptoms were improving and he has a history of following with Dr. Shalom Macario at [facility] in [facility] regarding his mesothelioma. Discussed options regarding possible chemotherapy palliative care intermittent drainage of the Pleurx catheter and possible hospice care. Currently they are advising treating anemia with IV Venofer here.   Cardiology was consulted and obtain an echocardiogram to assess the pericardial effusion    I am concerned about patients medical complexities and barriers to advancing in rehab goals including  improved pain control. He is complaining of flareup in his left shoulder x-rays reviewed. I would suggest heating pad, Lidoderm, pain medication and a left shoulder injection. I reviewed current care and plans for further care with other rehab providers including nursing and case management. According to recent nursing note,  \"[facility] oncology . please evaluate CBC pt. noted with low albumin, low protein, sig. drop since admission. Dr. Evelyn Stringer wanted to eval if pt. needed unit however H&H does not meet parameters. pt. to discharge home today. \"       [id number] Course: The patient was admitted to the Rehabilitation Unit to address ADL and mobility deficits-as detailed above and below. The patient was enrolled in acute PT, OT program.  Weekly team meetings were held to assess functional progress toward their goals-and modify the therapy program.  The patient's medical, emotional, psychosocial and functional issues were addressed. The patient progressed in the rehab program and is now ready for discharge home. Refer to functional assessments summary report for detailed functional status. Refer to the medical problem list below to see the medical issues addressed. The social and DC complexities are detailed in the DC planning section below. Greater than 3 5 minutes was spent on coordinating patients discharge including follow-up care, medications and patient/family education. Extended time needed because of the potential use of controlled medications are high risk medications and a high risk population individual.  Patient and family were instructed to use lowest effective dose of these medications and slowly titrate off over the next 2 to 4 weeks. They are not to combine opiates with sedatives.      I reviewed her [facility] prescription monitoring service data sheets in hopes of eliminating polypharmacy and weaning to the lowest effective dose of pain medications and eliminating the concomitant use of benzodiazepines. I see   no medications of concern. I see   no habits of combining sedatives and narcotics. ROS x10: The patient also complains of severely impaired mobility and activities of daily living. Otherwise no new problems with vision, hearing, nose, mouth, throat, dermal, cardiovascular, GI, , pulmonary, musculoskeletal, psychiatric or neurological. See also Acute Rehab PM&R H&P. Vital signs:  /61   Pulse 67   Temp 98.1 °F (36.7 °C) (Oral)   Resp 16   Ht 5' 7\" (1.702 m)   Wt 248 lb 11.2 oz (112.8 kg)   SpO2 98%   BMI 38.95 kg/m²   I/O:   PO/Intake:  fair PO intake,   Reg diet    Bowel:   continent    Bladder: continent    bishop  General:  Patient is well developed,   adequately nourished, and    well kempt. HEENT:    Pupils equal, hearing intact to loud voice, external inspection of ear and nose benign. Inspection of lips, tongue and gums  thrush    Musculoskeletal: No significant change in strength or tone. All joints stable. Inspection and palpation of digits and nails show no clubbing, cyanosis or inflammatory conditions. Neuro/Psychiatric: Affect: flat but pleasant. Alert and oriented to person, place and situation with  min cues. No significant change in deep tendon reflexes or sensation  Lungs:  Diminished, CTA-B. Respiration effort is   normal at rest.   Pleurx cath dressing D&I L Side. Heart:   S1 = S2,    irreg a fib. Abdomen:  Soft, non-tender, no enlargement of liver or spleen. Extremities:   mod lower extremity edema    Skin:   Intact to general survey,  Pleurx cath dressing D&I L Side. Rehabilitation:  Physical Therapy:   Bed mobility:  Bed mobility  Rolling to Left: Minimal assistance (02/26/23 1242)  Rolling to Right: Minimal assistance (02/26/23 1242)  Supine to Sit: Modified independent (03/12/23 1137)  Sit to Supine:  Moderate assistance (02/26/23 1242)  Bed Mobility Comments: HOB flat; cues to avoid breath holding (02/26/23 1242)  Roll Left  Assistance Level: Modified independent (03/11/23 1608)  Skilled Clinical Factors: HOB flat with use of bed rail (03/11/23 1608)  Roll Right  Assistance Level: Modified independent (03/11/23 1608)  Skilled Clinical Factors: With use of bed rail (03/11/23 1608)  Sit to Supine  Assistance Level: Independent (03/11/23 1608)  Skilled Clinical Factors: Increased time to complete (03/06/23 1605)  Supine to Sit  Assistance Level: Independent (03/11/23 1608)  Skilled Clinical Factors: Cues for breathing throughout transfer (03/06/23 1605)  Scooting  Assistance Level: Independent (03/11/23 1608)  Transfers:  Transfers  Sit to Stand: Modified independent (03/12/23 1150)  Stand to Sit: Modified independent (03/12/23 1150)  Comment: poor use of L LE during sit to stand; [facility] used (02/26/23 1243)  Transfers  Surface: Standard toilet;From chair with arms (03/11/23 3533)  Additional Factors: Set-up (03/11/23 0321)  Device: Magnolia Richardson (franca) (03/11/23 4828)  Sit to Stand  Assistance Level: Independent (03/11/23 1608)  Skilled Clinical Factors: demonstrated quality movement and efficiency, standing on first try (03/11/23 4484)  Stand to Sit  Assistance Level: Independent (03/11/23 1608)  Skilled Clinical Factors: cont to complete safely without vc (03/11/23 6845)  Bed To/From Chair  Technique: Stand step (03/09/23 1219)  Assistance Level: Independent (03/11/23 1608)  Skilled Clinical Factors: Completes with [facility] (03/08/23 1613)  Car Transfer  Assistance Level:  Independent (03/11/23 1608)  Skilled Clinical Factors: Improved technique and safety (03/11/23 1608)  Gait:   Ambulation  Surface: Level tile (03/12/23 1147)  Device: Rolling Walker (03/12/23 1147)  Other Apparatus: O2 (03/02/23 1406)  Assistance: Supervision (03/12/23 1147)  Quality of Gait: Downward gaze, decreased Juanito Heel strike Lt < Rt, no LOB (03/12/23 1147)  Gait Deviations: Slow Jefe; Increased MENA; Decreased step length;Decreased step height (02/26/23 1244)  Distance: 120ft x2 (03/12/23 1147)  Ambulation  Surface: Level surface (03/11/23 1608)  Device: Rolling walker (03/11/23 1608)  Distance: 100 feet (03/11/23 1608)  Activity: Within Unit (03/11/23 1608)  Activity Comments: demonstrates safety and min vc needed for posture (03/11/23 1608)  Additional Factors: Verbal cues (03/10/23 1209)  Assistance Level: Independent (03/10/23 1209)  Gait Deviations: Slow jefe;Decreased heel strike left;Decreased step length bilateral (03/11/23 1608)  Skilled Clinical Factors: Independent and good safety with short distance gait (03/09/23 1219)  Stairs:  Stairs/Curb  Stairs?: Yes (03/12/23 1147)  Stairs  # Steps : 4 (03/12/23 1147)  Stairs Height: 6\" (03/12/23 1147)  Rails: Right ascending (03/12/23 1147)  Assistance: Stand by assistance (03/12/23 1147)  Comment: Non reciprocal pattern (03/12/23 1147)  Stairs  Stair Height:  (nt d/t time limits) (03/11/23 1608)  Device: One handrail (03/10/23 1209)  Number of Stairs: 4 (03/10/23 1209)  Additional Factors: Verbal cues; Non-reciprocal going down;Reciprocal going up (03/10/23 1209)  Assistance Level: Supervision (03/09/23 1219)  Skilled Clinical Factors: Good safety (03/10/23 1209)  W/C mobility:       Occupational Therapy:   Hand Dominance: Right  ADL  Feeding: Stand by assistance (02/26/23 1201)  Grooming: Minimal assistance (02/26/23 1201)  UE Bathing: Minimal assistance (02/26/23 1201)  LE Bathing: Maximum assistance (02/26/23 1201)  LE Bathing Skilled Clinical Factors: periare only per pt request (02/26/23 1201)  UE Dressing: Moderate assistance (02/26/23 1201)  LE Dressing: Maximum assistance (02/26/23 1201)  Toileting: Maximum assistance (02/26/23 1201)  Additional Comments: sponge bath ADL completed sitting EOB and patially on toilet.  (02/26/23 1201)  Toilet Transfers  Toilet - Technique: Ambulating (02/26/23 1207)  Equipment Used: Grab bars (02/26/23 1207)  Toilet Transfer: Moderate assistance (02/26/23 1207)  Toilet Transfers Comments: CGA on ModA off (02/26/23 1207)          Speech Therapy:      Comprehension:  (AUditory comprehension is [facility])  Verbal Expression:  (Supervised assist required for high level naming and reasoning tasks secondary to reduced thought organization)  Diet/Swallow:           Compensatory Swallowing Strategies : Alternate solids and liquids, Eat/Feed slowly, Upright as possible for all oral intake, Small bites/sips          Lab/X-ray studies reviewed, analyzed and discussed with patient and staff:   Recent Results (from the past 24 hour(s))   TYPE AND SCREEN    Collection Time: 03/12/23  6:16 PM   Result Value Ref Range    ABO/Rh A POS     Antibody Screen NEG    PREPARE RBC (CROSSMATCH), 1 Units    Collection Time: 03/12/23  6:16 PM   Result Value Ref Range    Product Code Blood Bank [id number]     Description Blood Bank Red Blood Cells, Leuko-reduced     Unit Number [id number]     Dispense Status Blood Bank selected    Protime-INR    Collection Time: 03/13/23  4:39 AM   Result Value Ref Range    Protime 27.6 (H) 12.3 - 14.9 sec    INR 2.6    Hemoglobin and Hematocrit    Collection Time: 03/13/23  4:39 AM   Result Value Ref Range    Hemoglobin 7.6 (L) 14.0 - 18.0 g/dL    Hematocrit 24.1 (L) 42.0 - 52.0 %       3/5/23-CT  Left shoulder   Advanced osteoarthritis of the glenohumeral joint with intra-articular   ossified bodies. 2.  No acute fracture identified. 3.  Calcific tendinitis versus bursitis of the rotator cuff tendon,   subacromial subdeltoid bursa. XR ABDOMEN   2/18/2023   1. Complete opacification of left hemithorax compatible with some combination of pleural effusion and atelectasis. Secondary obscuration of left heart border. 2.  Nonobstructive bowel gas pattern. No acute abdominal disease identified. 3.  Probable left renal stone.      CT CHEST   2/23/2023 : Mediastinum: Thyroid is homogeneous in appearance. Vascular calcifications seen within the coronary vessels. Cardiac chambers are mildly enlarged. Pacer leads in satisfactory position. Moderate-sized pericardial effusion. Bulky adenopathy identified in the left hilar region likely reactive. Small lymph nodes seen scattered throughout the mediastinum likely reactive. Atherosclerotic disease seen diffusely throughout the thoracic aorta. Lungs/pleura: There is small chest tube identified in place on the left with small left pleural effusion. Airspace consolidation seen within the left upper and lower lung fields suggesting superimposed infiltrate such as pneumonia. Mild increased markings identified at the right lung base to suggest atelectatic change. Trace pneumothorax identified anteriorly. Upper Abdomen: Stones in the gallbladder. Small hiatal hernia. Soft Tissues/Bones: Multilevel degenerative changes seen within the spine. No acute chest wall abnormality. Indwelling chest tube identified on the left with small left pleural effusion and trace anterior pneumothorax. Consolidation seen in airspace disease throughout the left upper and lower lobes to suggest a multifocal superimposed pneumonia. Soft tissue density seen in the hilar region to suggest possible reactive adenopathy. Moderate-sized pericardial effusion. Minimal atelectatic changes seen at the right lung base. Incidental stones in the gallbladder with small hiatal hernia. XR CHEST   2/19/2023   1. Minimal partial clearing of the left lung. The previously noted left pleural effusion is smaller   2. Stable position of the left chest tube   3. The right lung is clear. XR CHEST  2/18/2023   1. Apparent interval placement of left chest tube catheter. No pneumothorax. 2.  Persistent opacification of the left hemithorax. XR CHEST  2/18/2023   1.  Near complete whiteout of the left hemithorax likely represent a combination of partial collapse of the left lung and large pleural effusion   2. Cardiomegaly   3. The right lung is grossly clear. US DUP UPPER EXTREMITY RIGHT VENOUS  2023   No evidence of DVT. FLUORO FOR SURGICAL PROCEDURES  2023  12 spot images of the lumbar spine were obtained. Intraprocedural fluoroscopic spot images as above. See separate procedure report for more information. EGD 2023  [id number] [facility] Patient: Dorothy Rincon MRN: [id number] : 1935 Account: Gender: Male Age: 82 Years Procedure: Upper GI endoscopy Date: 2023 Attending Physician: Jagdeep Haro Indications:        -  Anemia        -  Melena Medications:        -  Monitored Anesthesia Care Complications:        -  No immediate complications. Estimated Blood Loss:        -  Estimated blood loss: none. Procedure:        - The Gastroscope was introduced through the mouth and advanced to the second           part of the duodenum.        -  The patient tolerated the procedure well. Findings:        -  A 2 cm hernia was found.        -  Esophagogastric landmarks were identified: the gastroesophageal junction           was found at 36 cm, the lower esophageal sphincter was found at 38 cm and the           upper extent of the gastric folds was found at 36 cm from the incisors. -  Patchy moderate inflammation characterized by erythema was found in the           gastric antrum and in the gastric body. -  The examined duodenum was normal.        -  The cardia and gastric fundus were normal on retroflexion.        - No old or fresh blood noted Impression:        -  2 cm hernia. -  Esophagogastric landmarks identified.        -  Gastritis. -  Normal examined duodenum.        -  No specimens collected. - No old or fresh blood noted Recommendation:        -  Put patient on a clear liquid diet starting today. -  Continue present medications.         -     - 92287, Esophagogastroduodenoscopy, flexible, transoral; diagnostic,           including collection of specimen(s) by brushing or washing, when performed           (separate procedure) Diagnosis Code(s):        - D64.9, Anemia, unspecified        - K92.1, Melena (includes Hematochezia)        - K44.9, Diaphragmatic hernia without obstruction or gangrene        - K29.70, Gastritis, unspecified, without bleeding           Previous extensive, complex labs, notes and diagnostics reviewed and analyzed. ALLERGIES:    Allergies as of 02/25/2023 - Fully Reviewed 02/25/2023   Allergen Reaction Noted    Benadryl [diphenhydramine hcl] Anxiety 01/31/2012    Diphenhydramine Anxiety 05/09/2018      (please also verify by checking MAR)    Today I evaluated this patient for periodic reassessment of medical and functional status. The patient was discussed in detail at the treatment team meeting focusing on current medical issues, progress in therapies, social issues, psychological issues, barriers to progress and strategies to address these barriers, and discharge planning. See the addendum to rehab progress note-as a second progress note in the chart. The patient continues to be high risk for future disability and their medical and rehabilitation prognosis continue to be good and therefore, we will continue the patient's rehabilitation course as planned. The patient's tentative discharge date was set. Patient and family education was discussed. The patient was made aware of the team discussion regarding their progress. Complex Physical Medicine & Rehab Issues  Addressed during rehab:   Severe abnormality of gait and mobility and impaired self-care and ADL's secondary to progressive cardiopulmonary debility. Functional and medical status have improved sp acute rehab    Bowel and Bladder dysfunction  , Neurogenic bowel and bladder:  frequent toileting, ambulate to bathroom with assistance, check post void residuals.   Check for C.difficile x1 if >2 loose stools in 24 hours, continue bowel & bladder program.  Monitor bowel and bladder function. Lactinex 2 PO every AC. MOM prn, Brown Bomb prn, Glycerin suppository prn, enema prn. Encourage therapy and nursing to co-treat and problem solve re continence. Severe back pain, lumbar, as well as generalized OA pain: reassess pain every shift and prior to and after each therapy session, give prn Tylenol and consider scheduled Tylenol, modalities prn in therapy, masage, Lidoderm, K-pad prn. Consider scheduled AM pain meds. Consult Ortho for left shoulder injection  Skin healing  ears and breakdown risk:  continue pressure relief program.  Daily skin exams and reports from nursing. Fatigue due to nutritional and hydration deficiency: Add and titrate vitamin B12 vitamin D and CoQ10 continue to monitor I&Os, calorie counts prn, dietary consult prn. Add healthy snack at night. Acute episodic insomnia with situational adjustment disorder:  prn Ambien, monitor for day time sedation. Falls risk elevated:  patient to use call light to get nursing assistance to get up, bed and chair alarm. Elevated DVT risk: progressive activities in PT, continue prophylaxis JOB hose, elevation and meds-see MAR. Complex discharge planning: Discharge is awaiting medical stability and clearance from his medical physicians. Discharge March 13 , 2023 home with his wife and home health care. Weekly team meeting every Monday to re-assess progress towards goals, discuss and address social, psychological and medical comorbidities and to address difficulties they may be having progressing in therapy. Patient and family education is in progress. The patient is to follow-up with their family physician after discharge.         Complex Active General Medical Issues that complicated care:    Mesothelioma with dry cough-titrate O2, aerosols, follow-up with Dr. Henry Panchal, drain pleural Dex daily versus 3 times a week per protocols from pulmonology and lung surgery consult  Mixed hyperlipidemia,  Atherosclerosis of native artery of both lower extremities with intermittent claudication, Atrial fibrillation, Venous insufficiency-Acute rehab to monitor heart rate and rhythm with the option of telemetry and the effects of chronotropic medication with respect to increasing physical activity and exercise in PT, OT, ADLs with medication titration to lowest effective dosing. Continue blood signs every shift focusing on heart rate, rhythm and blood pressure checks with orthostatic checks-monitoring the effect of exercise, therapy and posture. Consult hospitalist for backup medical and adjust/add medications (aspirin, Lasix, Lopressor, Crestor, Coumadin, bridging Lovenox). Monitor heart rate and blood pressure as well as medications effects on vital signs before during and after therapy with especial focus on preventing orthostasis and falls risk. Recheck CBC and BMP. Gastritis without bleeding-Elevate head of bed after meals, monitor stools for blood, lowest effective dose of PPI, consider Tums. Pneumonia of both lungs due to infectious organism-Merrem Acute rehab for endurance traing with Pulse Ox to monitoring oxygen saturation and heart rate with O2 titration to lowest effective dose. Pulse oximeter checks to shift and at HS to dose and titrate oxygen and aerosol treatments monitor for nocturnal hypoxemia, monitor vital signs, oxygen prn. Focus on energy conservation. eft a Pleurx tube in O2   drain  Progressive anemia with history of GERD-and chronic anticoagulation for cardiac issues -elevate head of bed after meals, monitor stools for blood, lowest effective dose of PPI, consider Tums. Anticoagulation coumadinization for [id number] pharmacist regarding INR management    Acute kidney failure-Eliminate toxic medications, monitor I's and O's focusing on urine output, recheck BMP.     Spinal stenosis of lumbar region with neurogenic claudication    Balance disorder-focus on balance and therapy     Focus on endurance, activity pacing, reassessing rehab goals and discharge planning.           Electronically signed by Nurys Simmons DO on 2/27/23 at 8:24 AM AWRREN Regalado D.O., PM&R     Attending    [facility] Anne Mishra

## 2023-03-13 NOTE — PROGRESS NOTES
[facility]   Facility/Department: [facility]  Speech Language Pathology  Discharge Report        Patient: Dyllan Fenton  : 1935    Date: 3/13/2023    Initial Status:  Diet:   Regular solids with thin liquids  Dysphagia Outcome Severity Scale:  Ratin    Speech Therapy Level of Assistance Scale: Auditory Comprehension:  Rating: Independent  Verbal Expression:  Rating:Minimal Assistance  Motor Speech:  Rating: Independent  Problem Solving:  Rating: Minimal Assistance-Moderate Assistance  Memory:  Rating: Supervised Assistance-Minimal Assistance      Long Term Goals:  Long Term Goals  Time Frame for Long Term Goals: 2 weeks or LOS until goals are met. Goal 1: Pt will demonstrate functional cognitive-linguistic abilities in all opportunities with modified independence in order to safely complete ADLs. Long-term Goals  Timeframe for Long-term Goals: n/a        Patient's Response to Therapy:  Patient was presented with overall improvement with cognitive-linguistic skills. Intermittent supervised assist continued to be needed with high level problem solving, verbal reasoning and memory tasks. Recommend assistance with medication and finance management. Discharge Status:  Diet:   ADULT DIET; Regular  Compensatory Swallowing Strategies : Alternate solids and liquids, Eat/Feed slowly, Upright as possible for all oral intake, Small bites/sips  Dysphagia Outcome Severity Scale:  Ratin    Speech Therapy Level of Assistance Scale: Auditory Comprehension:  Rating: Modified Independent-Supervised Assistance  Verbal Expression:  Rating:Supervised Assistance  Motor Speech:  Rating: Independent  Problem Solving:  Rating: Supervised Assistance  Memory:  Rating: Modified Independent-Supervised Assistance        Functional Status at time of Discharge:    Cognition: Patient demonstrates mild cognitive deficits. Language: Patient demonstrates no language deficits.     Motor Speech: Patient demonstrates no motor speech deficits. Swallow: Patient demonstrates no dysphagia.                                  Patient is discharged to Home               [] Recommend continued speech therapy   [x] Speech Therapy is no longer warranted      Signature:  Electronically signed by GEOFF Martinez on 3/13/2023 at 7:56 AM

## 2023-03-13 NOTE — CARE COORDINATION
Pt is set to discharge home today, pt satisfaction survey was completed on 3/10. [facility] to start services and Severiano Awe is aware. O2 was ordered through [facility] and wife confirmed that she has the phone number to call when she gets home to have it delivered. Both pt and wife are in agreement with discharge for today.  Electronically signed by Pascual Goodpasture, MSW, LSW on 3/13/2023 at 4:51 PM

## 2023-03-13 NOTE — PROGRESS NOTES
OCCUPATIONAL THERAPY  INPATIENT REHAB TREATMENT NOTE  [address]      NAME: Tyree Jordan  : 1935 (82 y.o.)  MRN: [id number]  CODE STATUS: Full Code  Room: T544/J942-29    Date of Service: 3/13/2023    Referring Physician: Dr. Lorena Knight Diagnosis: Impaired mobility and ADL's due to severe pulmonary debility    Restrictions  Restrictions/Precautions  Restrictions/Precautions: Fall Risk         Position Activity Restriction  Other position/activity restrictions: pleurex on L side    Patient's date of birth confirmed: Yes    SAFETY:  Safety Devices  Safety Devices in place: Yes  Type of devices: All fall risk precautions in place    SUBJECTIVE:  Subjective: \" well your a good therapist.\"    Pain at start of treatment: Yes: 5/10    Pain at end of treatment: Yes: 5/10    Location: L shoulder  Nursing notified: No  Intervention: RN provided pain medication    COGNITION:  Orientation  Overall Orientation Status: Within Normal Limits  Orientation Level: Oriented X4  Cognition  Overall Cognitive Status: WFL      Pt's current cognitive status is:  Comprehension: Mod I  Expression: Independent  Social Interaction: Independent  Problem Solving: Supervision  Memory: Mod I    OBJECTIVE:         Feeding  Assistance Level: Independent  Grooming/Oral Hygiene  Assistance Level: Modified independent  Upper Extremity Bathing  Assistance Level: Minimal assistance  Skilled Clinical Factors: pt fluctuates with Ind during washing UB d/t LUE shoulder pain, today pt was in more pain and stated his arm hurt when he \"crosses over to reacher the other arm and armpit. \"  Lower Extremity Bathing  Equipment Provided: Long-handled sponge  Assistance Level: Modified independent  Upper Extremity Dressing  Assistance Level: Modified independent  Lower Extremity Dressing  Assistance Level: Modified independent  Putting On/Taking Off Footwear  Equipment Provided: Long-handle shoe horn  Assistance Level:  Moderate assistance  Skilled Clinical Factors: able to don shoes with mod Ind with long handled shoe horn; Max A for JOB hose, pt can push TEDs down to certain point of tightness, but then TDA (A) to doff them. Pt needs TD (A) to don JOB hose- pt states his wife puts these on and his socks when he needs them on, but also uses a sock aide if completing donning regular sock himself. Toileting  Skilled Clinical Factors: none  Toilet Transfers  Skilled Clinical Factors: none  Tub/Shower Transfers  Skilled Clinical Factors: sponge bath         Functional Mobility  Device: Rolling walker  Activity: To/From bathroom  Assistance Level: Modified independent  Sit to Supine  Assistance Level: Modified independent  Skilled Clinical Factors: pt needs time to complete task and does this task in intermittent steps, but is safe, use bed rail to assist.  Supine to Sit  Assistance Level: Modified independent  Scooting  Assistance Level: Modified independent  Sit to Stand  Assistance Level: Modified independent  Stand to Sit  Assistance Level: Modified independent  Bed To/From Chair  Technique: Stand step  Assistance Level: Modified independent         Education:  Education  Education Given To: Patient; Family  Education Provided: ADL Function; Safety;Equipment  Education Provided Comments: Pt wife educated on use of long handled sponge for washing,drying; pt wife educated that the height of their bed may need to modified d/t pt ability to get into/out of bed for pt safety and Ind. Pt wife and pt also educated on bed handle that can be ordered if pt needs a handle to assist him into/out of bed d/t pt using bed rail on a consistent basis. Pt and wife educated on pt sleeping in recliner if needed d/t pt coughing more at times if laying flat; pt wife asked about a pillow wedge to place in bed and this was encouraged to help keep pt elevated d/t fluid in lung.   Education Method: Verbal;Demonstration  Barriers to Learning: None  Education Outcome: Demonstrated understanding;Verbalized understanding;Continued education needed    Equipment recommendations: grab bars for shower, suction cup ones; pt wife educated to check them each time pt goes into shower. ASSESSMENT:  Assessment: Pt very resistive to completing therapy ADL this morning but did get up and participate. Pt did all ADLs asked to completed but declined taking a shower, and pt took sponge bath. Pt did become very short of breath during tasks and needed frequent rest breaks. Activity Tolerance: Patient tolerated treatment well      PLAN OF CARE:  Balance training, Functional mobility training, Strengthening, ROM, Endurance training, Safety education & training, Patient/Caregiver education & training, Equipment evaluation, education, & procurement, Home management training, Self-Care / ADL, Coordination training  Continue OT POC until discharge    Patient goals : to get stronger and possibly return to driving  Time Frame for Long Term Goals : within 1.5-2wks pt will demosntrate progress in the following areas to achieve specific LTG's listed in the initial eval  Long Term Goal 1: improve overall strength/endurance for functional tasks. Long Term Goal 2: improve independence with self care  Long Term Goal 3: improve sitting/standing balance for ADL tasks  Long Term Goal 4: improve functional mobility with LRD for safe item transport/retrieval  Long Term Goal 5: improve FMC to independently manipulate fasteners        Therapy Time:   Individual Group Co-Treat   Time In 1030       Time Out 1145         Minutes 75             ADL/IADL trainin minutes     Electronically signed by:     HERMAN Burton,   3/13/2023, 12:02 PM

## 2023-03-13 NOTE — PROGRESS NOTES
Subjective: The patient complains of moderate to severe acute on chronic progressive fatigue and  PRIETO partially relieved by rest, medications, PT,  OT,   SLP and rest and exacerbated by recent illness  . Patient had initially been treated at [facility] [place] where a nerve stimulator placement was attempted but delayed due to high INR. Once the procedure was finally initiated it was aborted because of possible CSF leak. Patient became shortness of breath postprocedure but this was found to be largely unrelated to the procedure but related to a large loculated pleural effusion. Patient was transferred to [facility] for thoracotomy and VATS procedure performed by Dr. Alisha Burton. As noted patient had a chest tube inserted by Dr. Alisha Burton on 2/18/2023. He has been followed by thoracic surgery pulmonology oncology GI and cardiology as well as the hospitalist.     From a thoracic surgery standpoint cultures were sent from his pleural effusion which were positive and he is now on IV antibiotics. The left a Pleurx tube in O2   drain 3 times a week. The plan is for outpatient removal of the drain and if any worsening symptoms occur after the Pleurx is removed possible VATS surgery. They are trying to avoid surgery for now. Pulmonology followed him advising titrating O2 to keep sats above 90 continue home CPAP and continue draining the Pleurx daily. 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 He they noted his symptoms were improving and he has a history of following with Dr. Amy Watts at [facility] regarding his mesothelioma. Discussed options regarding possible chemotherapy palliative care intermittent drainage of the Pleurx catheter and possible hospice care. Currently they are advising treating anemia with IV Venofer here.   Cardiology was consulted and obtain an echocardiogram to assess the pericardial effusion    I am concerned about patients medical complexities and barriers to advancing in rehab goals including  improved pain control. He is complaining of flareup in his left shoulder x-rays reviewed. I would suggest heating pad, Lidoderm, pain medication and a left shoulder injection. I reviewed current care and plans for further care with other rehab providers including nursing and case management. According to recent nursing note,  \"[facility] oncology . please evaluate CBC pt. noted with low albumin, low protein, sig. drop since admission. Dr. Dena Bello wanted to eval if pt. needed unit however H&H does not meet parameters. pt. to discharge home today. \"      I am concerned about his anemia  -however it is stabilizing he apparently needs a echocardiogram before he leaves. ROS x10: The patient also complains of severely impaired mobility and activities of daily living. Otherwise no new problems with vision, hearing, nose, mouth, throat, dermal, cardiovascular, GI, , pulmonary, musculoskeletal, psychiatric or neurological. See also Acute Rehab PM&R H&P. Vital signs:  /71   Pulse 61   Temp 98.4 °F (36.9 °C)   Resp 16   Ht 5' 7\" (1.702 m)   Wt 248 lb 11.2 oz (112.8 kg)   SpO2 99%   BMI 38.95 kg/m²   I/O:   PO/Intake:  fair PO intake,   Reg diet    Bowel:   continent    Bladder: continent    bishop  General:  Patient is well developed,   adequately nourished, and    well kempt. HEENT:    Pupils equal, hearing intact to loud voice, external inspection of ear and nose benign. Inspection of lips, tongue and gums  thrush    Musculoskeletal: No significant change in strength or tone. All joints stable. Inspection and palpation of digits and nails show no clubbing, cyanosis or inflammatory conditions. Neuro/Psychiatric: Affect: flat but pleasant. Alert and oriented to person, place and situation with  min cues. No significant change in deep tendon reflexes or sensation  Lungs:  Diminished, CTA-B.  Respiration effort is   normal at rest.   Pleurx cath dressing D&I L Side. Heart:   S1 = S2,    irreg a fib. Abdomen:  Soft, non-tender, no enlargement of liver or spleen. Extremities:   mod lower extremity edema    Skin:   Intact to general survey,  Pleurx cath dressing D&I L Side. Rehabilitation:  Physical Therapy:   Bed mobility:  Bed mobility  Rolling to Left: Minimal assistance (02/26/23 1242)  Rolling to Right: Minimal assistance (02/26/23 1242)  Supine to Sit: Modified independent (03/12/23 1137)  Sit to Supine: Moderate assistance (02/26/23 1242)  Bed Mobility Comments: HOB flat; cues to avoid breath holding (02/26/23 1242)  Roll Left  Assistance Level: Modified independent (03/11/23 1608)  Skilled Clinical Factors: HOB flat with use of bed rail (03/11/23 1608)  Roll Right  Assistance Level: Modified independent (03/11/23 1608)  Skilled Clinical Factors: With use of bed rail (03/11/23 1608)  Sit to Supine  Assistance Level: Independent (03/11/23 1608)  Skilled Clinical Factors: Increased time to complete (03/06/23 1605)  Supine to Sit  Assistance Level: Independent (03/11/23 1608)  Skilled Clinical Factors: Cues for breathing throughout transfer (03/06/23 1605)  Scooting  Assistance Level: Independent (03/11/23 1608)  Transfers:  Transfers  Sit to Stand: Modified independent (03/12/23 1150)  Stand to Sit: Modified independent (03/12/23 1150)  Comment: poor use of L LE during sit to stand; [employer] used (02/26/23 1243)  Transfers  Surface: Standard toilet;From chair with arms (03/11/23 0100)  Additional Factors: Set-up (03/11/23 7680)  Device: [employer] (ww) (03/11/23 4552)  Sit to Stand  Assistance Level: Independent (03/11/23 1608)  Skilled Clinical Factors: demonstrated quality movement and efficiency, standing on first try (03/11/23 3297)  Stand to Sit  Assistance Level:  Independent (03/11/23 1608)  Skilled Clinical Factors: cont to complete safely without vc (03/11/23 7407)  Bed To/From Chair  Technique: Stand step (03/09/23 1219)  Assistance Level: Independent (03/11/23 1608)  Skilled Clinical Factors: Completes with [employer] (03/08/23 1613)  Car Transfer  Assistance Level: Independent (03/11/23 1608)  Skilled Clinical Factors: Improved technique and safety (03/11/23 1608)  Gait:   Ambulation  Surface: Level tile (03/12/23 1147)  Device: Rolling Walker (03/12/23 1147)  Other Apparatus: O2 (03/02/23 1406)  Assistance: Supervision (03/12/23 1147)  Quality of Gait: Downward gaze, decreased Juanito Heel strike Lt < Rt, no LOB (03/12/23 1147)  Gait Deviations: Slow Jefe; Increased MENA; Decreased step length;Decreased step height (02/26/23 1244)  Distance: 120ft x2 (03/12/23 1147)  Ambulation  Surface: Level surface (03/11/23 1608)  Device: Rolling walker (03/11/23 1608)  Distance: 100 feet (03/11/23 1608)  Activity: Within Unit (03/11/23 1608)  Activity Comments: demonstrates safety and min vc needed for posture (03/11/23 1608)  Additional Factors: Verbal cues (03/10/23 1209)  Assistance Level: Independent (03/10/23 1209)  Gait Deviations: Slow jefe;Decreased heel strike left;Decreased step length bilateral (03/11/23 1608)  Skilled Clinical Factors: Independent and good safety with short distance gait (03/09/23 1219)  Stairs:  Stairs/Curb  Stairs?: Yes (03/12/23 1147)  Stairs  # Steps : 4 (03/12/23 1147)  Stairs Height: 6\" (03/12/23 1147)  Rails: Right ascending (03/12/23 1147)  Assistance: Stand by assistance (03/12/23 1147)  Comment: Non reciprocal pattern (03/12/23 1147)  Stairs  Stair Height:  (nt d/t time limits) (03/11/23 1608)  Device: One handrail (03/10/23 1209)  Number of Stairs: 4 (03/10/23 1209)  Additional Factors: Verbal cues; Non-reciprocal going down;Reciprocal going up (03/10/23 1209)  Assistance Level: Supervision (03/09/23 1219)  Skilled Clinical Factors: Good safety (03/10/23 1209)  W/C mobility:       Occupational Therapy:   Hand Dominance: Right  ADL  Feeding: Stand by assistance (02/26/23 1201)  Grooming: Minimal assistance (02/26/23 1201)  UE Bathing: Minimal assistance (02/26/23 1201)  LE Bathing: Maximum assistance (02/26/23 1201)  LE Bathing Skilled Clinical Factors: periare only per pt request (02/26/23 1201)  UE Dressing: Moderate assistance (02/26/23 1201)  LE Dressing: Maximum assistance (02/26/23 1201)  Toileting: Maximum assistance (02/26/23 1201)  Additional Comments: sponge bath ADL completed sitting EOB and patially on toilet. (02/26/23 1201)  Toilet Transfers  Toilet - Technique: Ambulating (02/26/23 1207)  Equipment Used: Grab bars (02/26/23 1207)  Toilet Transfer: Moderate assistance (02/26/23 1207)  Toilet Transfers Comments: CGA on ModA off (02/26/23 1207)          Speech Therapy:      Comprehension:  (AUditory comprehension is [facility])  Verbal Expression:  (Supervised assist required for high level naming and reasoning tasks secondary to reduced thought organization)  Diet/Swallow:           Compensatory Swallowing Strategies :  Alternate solids and liquids, Eat/Feed slowly, Upright as possible for all oral intake, Small bites/sips          Lab/X-ray studies reviewed, analyzed and discussed with patient and staff:   Recent Results (from the past 24 hour(s))   TYPE AND SCREEN    Collection Time: 03/12/23  6:16 PM   Result Value Ref Range    ABO/Rh A POS     Antibody Screen NEG    PREPARE RBC (CROSSMATCH), 1 Units    Collection Time: 03/12/23  6:16 PM   Result Value Ref Range    Product Code Blood Bank [id number]     Description Blood Bank Red Blood Cells, Leuko-reduced     Unit Number [id number]     Dispense Status Blood Bank selected    Protime-INR    Collection Time: 03/13/23  4:39 AM   Result Value Ref Range    Protime 27.6 (H) 12.3 - 14.9 sec    INR 2.6    Hemoglobin and Hematocrit    Collection Time: 03/13/23  4:39 AM   Result Value Ref Range    Hemoglobin 7.6 (L) 14.0 - 18.0 g/dL    Hematocrit 24.1 (L) 42.0 - 52.0 %       3/5/23-CT  Left shoulder   Advanced osteoarthritis of the glenohumeral joint with intra-articular   ossified bodies. 2.  No acute fracture identified. 3.  Calcific tendinitis versus bursitis of the rotator cuff tendon,   subacromial subdeltoid bursa. XR ABDOMEN   2/18/2023   1. Complete opacification of left hemithorax compatible with some combination of pleural effusion and atelectasis. Secondary obscuration of left heart border. 2.  Nonobstructive bowel gas pattern. No acute abdominal disease identified. 3.  Probable left renal stone. CT CHEST   2/23/2023 : Mediastinum: Thyroid is homogeneous in appearance. Vascular calcifications seen within the coronary vessels. Cardiac chambers are mildly enlarged. Pacer leads in satisfactory position. Moderate-sized pericardial effusion. Bulky adenopathy identified in the left hilar region likely reactive. Small lymph nodes seen scattered throughout the mediastinum likely reactive. Atherosclerotic disease seen diffusely throughout the thoracic aorta. Lungs/pleura: There is small chest tube identified in place on the left with small left pleural effusion. Airspace consolidation seen within the left upper and lower lung fields suggesting superimposed infiltrate such as pneumonia. Mild increased markings identified at the right lung base to suggest atelectatic change. Trace pneumothorax identified anteriorly. Upper Abdomen: Stones in the gallbladder. Small hiatal hernia. Soft Tissues/Bones: Multilevel degenerative changes seen within the spine. No acute chest wall abnormality. Indwelling chest tube identified on the left with small left pleural effusion and trace anterior pneumothorax. Consolidation seen in airspace disease throughout the left upper and lower lobes to suggest a multifocal superimposed pneumonia. Soft tissue density seen in the hilar region to suggest possible reactive adenopathy. Moderate-sized pericardial effusion. Minimal atelectatic changes seen at the right lung base.   Incidental stones in the gallbladder with small hiatal hernia.     XR CHEST   2023   1. Minimal partial clearing of the left lung.  The previously noted left pleural effusion is smaller   2. Stable position of the left chest tube   3. The right lung is clear.     XR CHEST  2023   1.  Apparent interval placement of left chest tube catheter.  No pneumothorax.   2.  Persistent opacification of the left hemithorax.     XR CHEST  2023   1. Near complete whiteout of the left hemithorax likely represent a combination of partial collapse of the left lung and large pleural effusion   2. Cardiomegaly   3. The right lung is grossly clear.     US DUP UPPER EXTREMITY RIGHT VENOUS  2023   No evidence of DVT.     FLUORO FOR SURGICAL PROCEDURES  2023  12 spot images of the lumbar spine were obtained.     Intraprocedural fluoroscopic spot images as above.  See separate procedure report for more information.     EGD 2023  [facility] Patient: COSME LEMON MRN: [id number] : 1935 Account: Gender: Male Age: 87 Years Procedure: Upper GI endoscopy Date: 2023 Attending Physician: Chance Chen Indications:        -  Anemia        -  Melena Medications:        -  Monitored Anesthesia Care Complications:        -  No immediate complications. Estimated Blood Loss:        -  Estimated blood loss: none. Procedure:        - The Gastroscope was introduced through the mouth and advanced to the second           part of the duodenum.        -  The patient tolerated the procedure well. Findings:        -  A 2 cm hernia was found.        -  Esophagogastric landmarks were identified: the gastroesophageal junction           was found at 36 cm, the lower esophageal sphincter was found at 38 cm and the           upper extent of the gastric folds was found at 36 cm from the incisors.        -  Patchy moderate inflammation characterized by erythema was found in the           gastric antrum and in the gastric body.        -   The examined duodenum was normal.        -  The cardia and gastric fundus were normal on retroflexion.        - No old or fresh blood noted Impression:        -  2 cm hernia. -  Esophagogastric landmarks identified.        -  Gastritis. -  Normal examined duodenum.        -  No specimens collected. - No old or fresh blood noted Recommendation:        -  Put patient on a clear liquid diet starting today. -  Continue present medications. -     - 26999, Esophagogastroduodenoscopy, flexible, transoral; diagnostic,           including collection of specimen(s) by brushing or washing, when performed           (separate procedure) Diagnosis Code(s):        - D64.9, Anemia, unspecified        - K92.1, Melena (includes Hematochezia)        - K44.9, Diaphragmatic hernia without obstruction or gangrene        - K29.70, Gastritis, unspecified, without bleeding           Previous extensive, complex labs, notes and diagnostics reviewed and analyzed. ALLERGIES:    Allergies as of 02/25/2023 - Fully Reviewed 02/25/2023   Allergen Reaction Noted    Benadryl [diphenhydramine hcl] Anxiety 01/31/2012    Diphenhydramine Anxiety 05/09/2018      (please also verify by checking MAR)    Today I evaluated this patient for periodic reassessment of medical and functional status. The patient was discussed in detail at the treatment team meeting focusing on current medical issues, progress in therapies, social issues, psychological issues, barriers to progress and strategies to address these barriers, and discharge planning. See the addendum to rehab progress note-as a second progress note in the chart. The patient continues to be high risk for future disability and their medical and rehabilitation prognosis continue to be good and therefore, we will continue the patient's rehabilitation course as planned. The patient's tentative discharge date was set. Patient and family education was discussed.   The patient was made aware of the team discussion regarding their progress. Complex Physical Medicine & Rehab Issues Assess & Plan:   Severe abnormality of gait and mobility and impaired self-care and ADL's secondary to progressive cardiopulmonary debility. Functional and medical status reassessed regarding patients ability to participate in therapies and patient found to be able to participate in acute intensive comprehensive inpatient rehabilitation program including PT/OT to improve balance, ambulation, ADLs, and to improve the P/AROM. Therapeutic modifications regarding activities in therapies, place, amount of time per day and intensity of therapy made daily. In bed therapies or bedside therapies prn. Bowel and Bladder dysfunction  , Neurogenic bowel and bladder:  frequent toileting, ambulate to bathroom with assistance, check post void residuals. Check for C.difficile x1 if >2 loose stools in 24 hours, continue bowel & bladder program.  Monitor bowel and bladder function. Lactinex 2 PO every AC. MOM prn, Brown Bomb prn, Glycerin suppository prn, enema prn. Encourage therapy and nursing to co-treat and problem solve re continence. Severe back pain, lumbar, as well as generalized OA pain: reassess pain every shift and prior to and after each therapy session, give prn Tylenol and consider scheduled Tylenol, modalities prn in therapy, masage, Lidoderm, K-pad prn. Consider scheduled AM pain meds. Consult Ortho for left shoulder injection  Skin healing  ears and breakdown risk:  continue pressure relief program.  Daily skin exams and reports from nursing. Fatigue due to nutritional and hydration deficiency: Add and titrate vitamin B12 vitamin D and CoQ10 continue to monitor I&Os, calorie counts prn, dietary consult prn. Add healthy snack at night. Acute episodic insomnia with situational adjustment disorder:  prn Ambien, monitor for day time sedation.   Falls risk elevated:  patient to use call light to get nursing assistance to get up, bed and chair alarm. Elevated DVT risk: progressive activities in PT, continue prophylaxis JOB hose, elevation and meds-see MAR. Complex discharge planning: Discharge is awaiting medical stability and clearance from his medical physicians. Discharge March , 2023 home with his wife and home health care. Weekly team meeting every Monday to re-assess progress towards goals, discuss and address social, psychological and medical comorbidities and to address difficulties they may be having progressing in therapy. Patient and family education is in progress. The patient is to follow-up with their family physician after discharge. Complex Active General Medical Issues that complicate care Assess & Plan:    Mesothelioma with dry cough-titrate O2, aerosols, follow-up with Dr. Garett Segovia, drain pleural Dex daily versus 3 times a week per protocols from pulmonology and lung surgery consult  Mixed hyperlipidemia,  Atherosclerosis of native artery of both lower extremities with intermittent claudication, Atrial fibrillation, Venous insufficiency-Acute rehab to monitor heart rate and rhythm with the option of telemetry and the effects of chronotropic medication with respect to increasing physical activity and exercise in PT, OT, ADLs with medication titration to lowest effective dosing. Continue blood signs every shift focusing on heart rate, rhythm and blood pressure checks with orthostatic checks-monitoring the effect of exercise, therapy and posture. Consult hospitalist for backup medical and adjust/add medications (aspirin, Lasix, Lopressor, Crestor, Coumadin, bridging Lovenox). Monitor heart rate and blood pressure as well as medications effects on vital signs before during and after therapy with especial focus on preventing orthostasis and falls risk. Recheck CBC and BMP.     Gastritis without bleeding-Elevate head of bed after meals, monitor stools for blood, lowest effective dose of PPI, consider Tums. Pneumonia of both lungs due to infectious organism-Merrem Acute rehab for endurance traing with Pulse Ox to monitoring oxygen saturation and heart rate with O2 titration to lowest effective dose. Pulse oximeter checks to shift and at HS to dose and titrate oxygen and aerosol treatments monitor for nocturnal hypoxemia, monitor vital signs, oxygen prn. Focus on energy conservation. eft a Pleurx tube in O2   drain  Progressive anemia with history of GERD-and chronic anticoagulation for cardiac issues -elevate head of bed after meals, monitor stools for blood, lowest effective dose of PPI, consider Tums. Anticoagulation coumadinization for G-yle-ssndqso pharmacist regarding INR management    Acute kidney failure-Eliminate toxic medications, monitor I's and O's focusing on urine output, recheck BMP. Spinal stenosis of lumbar region with neurogenic claudication    Balance disorder-focus on balance and therapy     Focus on endurance, activity pacing, reassessing rehab goals and discharge planning.           Electronically signed by Lg Palmer DO on 2/27/23 at 8:24 AM WARREN Rashid D.O., PM&R     Attending    [facility] Anne Mishra

## 2023-03-13 NOTE — PROGRESS NOTES
INDIVIDUALIZED OVERALL REHAB PLAN OF CARE  ADDENDUM TO REHAB PROGRESS NOTE-for audit purposes must also refer to this day's clinical note and combine the information      Date: 3/13/2023  Patient Name: Daniel Rust   Room: B077/H127-32    MRN: [id number]    : 1935  (82 y.o.)  Gender: male       Today 3/13/2023 during weekly team meeting, I reviewed the patient Daniel Rust in detail with the therapists and nurses involved in patient's care gathering complex physiatric data regarding current medical issues, progress in therapies, factors limiting progress, social issues, psychological issues, ongoing therapeutic plans and discharge planning. Legend:  I= independent Im =Modified independent  S=Supervised SB=stand by DIAZ=set up CG=contact jodie Min= minimal Mod=Moderate Max=maximal Max of 2 =maximal assist of 2 people      CURRENT FUNCTIONAL STATUS:        NURSING ISSUES:   Perfect serve oncology . please evaluate CBC pt. noted with low albumin, low protein, sig. drop since admission. Dr. Tamar Wallace wanted to eval if pt. needed unit however H&H does not meet parameters. pt. to discharge home today. Monitoring post void residuals monitoring for severe constipation and bowel obstruction. Barriers to progress and discharge severe pain      Bowel function- constipation  Plans to address- scheduled voids     Bladder function-  continent    Plans to address- schedule voids before bed and therapy     Skin deficits-  icthyosis   Plans to address- topicals and dressing changes     Hydration/Nutritional deficits- monitoring for dysphagia  Plans to address-Push PO, assist with feeds as needed      BP- decline in BP intake is a concern  Plans to address- check ortho BPs before therapies-and use abdominal binder and TEDs as needed    Pt and Family training goals-  teach home technology options like Risa use and home assistive devices      Focus on achieving ADL goals with co-treating with OT when possible.  Focus on cognition and co treat with SLP when possible. PHYSICAL THERAPY  Bed mobility:  Bed mobility  Rolling to Left: Minimal assistance (02/26/23 1242)  Rolling to Right: Minimal assistance (02/26/23 1242)  Supine to Sit: Modified independent (03/12/23 1137)  Sit to Supine: Moderate assistance (02/26/23 1242)  Bed Mobility Comments: HOB flat; cues to avoid breath holding (02/26/23 1242)  Roll Left  Assistance Level: Modified independent (03/11/23 1608)  Skilled Clinical Factors: HOB flat with use of bed rail (03/11/23 1608)  Roll Right  Assistance Level: Modified independent (03/11/23 1608)  Skilled Clinical Factors: With use of bed rail (03/11/23 1608)  Sit to Supine  Assistance Level: Independent (03/11/23 1608)  Skilled Clinical Factors: Increased time to complete (03/06/23 1605)  Supine to Sit  Assistance Level: Independent (03/11/23 1608)  Skilled Clinical Factors: Cues for breathing throughout transfer (03/06/23 1605)  Scooting  Assistance Level: Independent (03/11/23 1608)  Transfers:  Transfers  Sit to Stand: Modified independent (03/12/23 1150)  Stand to Sit: Modified independent (03/12/23 1150)  Comment: poor use of L LE during sit to stand; [employer] used (02/26/23 1243)  Transfers  Surface: Standard toilet;From chair with arms (03/11/23 9676)  Additional Factors: Set-up (03/11/23 7073)  Device: Makry Argueta (franca) (03/11/23 8409)  Sit to Stand  Assistance Level: Independent (03/11/23 1608)  Skilled Clinical Factors: demonstrated quality movement and efficiency, standing on first try (03/11/23 8493)  Stand to Sit  Assistance Level: Independent (03/11/23 1608)  Skilled Clinical Factors: cont to complete safely without vc (03/11/23 8676)  Bed To/From Chair  Technique: Stand step (03/09/23 1219)  Assistance Level: Independent (03/11/23 1608)  Skilled Clinical Factors: Completes with [employer] (03/08/23 1613)  Car Transfer  Assistance Level:  Independent (03/11/23 1608)  Skilled Clinical Factors: Improved technique and safety (03/11/23 1608)  Gait:   Ambulation  Surface: Level tile (03/12/23 1147)  Device: Rolling Walker (03/12/23 1147)  Other Apparatus: O2 (03/02/23 1406)  Assistance: Supervision (03/12/23 1147)  Quality of Gait: Downward gaze, decreased Juanito Heel strike Lt < Rt, no LOB (03/12/23 1147)  Gait Deviations: Slow Jefe; Increased MENA; Decreased step length;Decreased step height (02/26/23 1244)  Distance: 120ft x2 (03/12/23 1147)  Ambulation  Surface: Level surface (03/11/23 1608)  Device: Rolling walker (03/11/23 1608)  Distance: 100 feet (03/11/23 1608)  Activity: Within Unit (03/11/23 1608)  Activity Comments: demonstrates safety and min vc needed for posture (03/11/23 1608)  Additional Factors: Verbal cues (03/10/23 1209)  Assistance Level: Independent (03/10/23 1209)  Gait Deviations: Slow jefe;Decreased heel strike left;Decreased step length bilateral (03/11/23 1608)  Skilled Clinical Factors: Independent and good safety with short distance gait (03/09/23 1219)  Stairs:  Stairs/Curb  Stairs?: Yes (03/12/23 1147)  Stairs  # Steps : 4 (03/12/23 1147)  Stairs Height: 6\" (03/12/23 1147)  Rails: Right ascending (03/12/23 1147)  Assistance: Stand by assistance (03/12/23 1147)  Comment: Non reciprocal pattern (03/12/23 1147)  Stairs  Stair Height:  (nt d/t time limits) (03/11/23 1608)  Device: One handrail (03/10/23 1209)  Number of Stairs: 4 (03/10/23 1209)  Additional Factors: Verbal cues; Non-reciprocal going down;Reciprocal going up (03/10/23 1209)  Assistance Level: Supervision (03/09/23 1219)  Skilled Clinical Factors: Good safety (03/10/23 1209)  W/C mobility:           Pt and Family training goals-  teach patient/family best use of assistive device        OCCUPATIONAL THERAPY  Feeding  Assistance Level: Independent (03/13/23 1151)  Skilled Clinical Factors: demo slight difficulty getting lid off cup and cup filled very full. Assisted pt with task to decrease risk of spilling beverage.  (03/03/23 0846)  Grooming/Oral Hygiene  Assistance Level: Modified independent (03/13/23 1151)  Skilled Clinical Factors: standing at sink to brush hair and complete denture care (leans on forearms to complete task for endurance and balance) (03/10/23 1034)  Upper Extremity Bathing  Assistance Level: Minimal assistance (03/13/23 1151)  Skilled Clinical Factors: pt fluctuates with Ind during washing UB d/t LUE shoulder pain, today pt was in more pain and stated his arm hurt when he \"crosses over to reacher the other arm and armpit. \" (03/13/23 1151)  Lower Extremity Bathing  Equipment Provided: Long-handled sponge (03/13/23 1151)  Assistance Level: Modified independent (03/13/23 1151)  Skilled Clinical Factors: long  handled sponge for feet only (03/10/23 1034)  Upper Extremity Dressing  Equipment Provided: Dressing stick (02/27/23 1201)  Assistance Level: Modified independent (03/13/23 1151)  Skilled Clinical Factors: seated to take shirt off and standing leaning against sink for posterior support to don shirt; no LOB; pt states this is how he completes task at home to don/doff shirt, \" leaning against the sink. \" (03/06/23 1158)  Lower Extremity Dressing  Equipment Provided: Reachers (03/10/23 1034)  Assistance Level: Modified independent (03/13/23 1151)  Skilled Clinical Factors: vc's to use reacher d/t demo difficulty threading RLE (03/10/23 1034)  Putting On/Taking Off Footwear  Equipment Provided: Long-handle shoe horn (03/13/23 1151)  Assistance Level: Moderate assistance (03/13/23 1151)  Skilled Clinical Factors: able to don shoes with mod Ind with long handled shoe horn; Max A for JOB hose, pt can push TEDs down to certain point of tightness, but then TDA (A) to doff them.  Pt needs TD (A) to don JOB hose- pt states his wife puts these on and his socks when he needs them on, but also uses a sock aide if completing donning regular sock himself. (03/13/23 1151)  Toileting  Assistance Level: Supervision (03/08/23 8623)  Skilled Clinical Factors: none (03/13/23 1151)  Toilet Transfers  Technique: Stand step (03/08/23 1549)  Equipment: Standard toilet (03/08/23 1549)  Additional Factors: With handrails (03/08/23 1549)  Assistance Level: Supervision (03/08/23 1549)  Skilled Clinical Factors: none (03/13/23 1151)  Tub/Shower Transfers  Type: Shower (03/10/23 1034)  Transfer From: Rolling walker (03/10/23 1034)  Transfer To: Shower chair with back (03/10/23 1034)  Assistance Level: Modified independent (03/10/23 1034)  Skilled Clinical Factors: sponge bath (03/13/23 1151)  ADL  Feeding: Stand by assistance (02/26/23 1201)  Grooming: Minimal assistance (02/26/23 1201)  UE Bathing: Minimal assistance (02/26/23 1201)  LE Bathing: Maximum assistance (02/26/23 1201)  LE Bathing Skilled Clinical Factors: periare only per pt request (02/26/23 1201)  UE Dressing: Moderate assistance (02/26/23 1201)  LE Dressing: Maximum assistance (02/26/23 1201)  Toileting: Maximum assistance (02/26/23 1201)  Additional Comments: sponge bath ADL completed sitting EOB and patially on toilet. (02/26/23 1201)  Toilet Transfers  Toilet - Technique: Ambulating (02/26/23 1207)  Equipment Used: Grab bars (02/26/23 1207)  Toilet Transfer: Moderate assistance (02/26/23 1207)  Toilet Transfers Comments: CGA on ModA off (02/26/23 1207)    Plans for addressing ADL deficits affecting: Focus on sequencing and energy conservation. SPEECH THERAPY/SLP     Comprehension:  (AUditory comprehension is [facility])  Verbal Expression:  (Supervised assist required for high level naming and reasoning tasks secondary to reduced thought organization)    Plans for cognitive and communication issues affecting addressing:   Pt and Family training goals-  teach home tecnology options like Risa use and home assistive devices       Diet/Swallow:             Compensatory Swallowing Strategies :  Alternate solids and liquids, Eat/Feed slowly, Upright as possible for all oral intake, Small bites/sips             COGNITION  OT: Cognition Comment: follows commands consistently @FLOWTIME(304  SP:        Social History     Socioeconomic History    Marital status:      Spouse name: Davis Patel    Number of children: Not on file    Years of education: Not on file    Highest education level: Not on file   Occupational History    Not on file   Tobacco Use    Smoking status: Former     Packs/day: 0.50     Years: 10.00     Pack years: 5.00     Types: Cigarettes     Quit date: 1968     Years since quittin.9    Smokeless tobacco: Never   Vaping Use    Vaping Use: Never used   Substance and Sexual Activity    Alcohol use: Yes     Comment: social    Drug use: Never    Sexual activity: Not on file   Other Topics Concern    Not on file   Social History Narrative    Lives With: Spouse Ashleigh    Type of Home: House in [address] [place] [address] Street: One level (basement)    Home Access: Stairs to enter without rails (hoolds on to door jamb)    Entrance Stairs - Number of Steps: 2    Bathroom Shower/Tub: Walk-in shower    Home Equipment: Walker, rolling, Rollator, Cane (uses rollator outside)    Has the patient had two or more falls in the past year or any fall with injury in the past year?: Yes (broken rib and puncture lung)    ADL Assistance: Independent    Homemaking Assistance: Needs assistance (staniding tolerance is limited)    Ambulation Assistance: Independent (ww)    Transfer Assistance: Independent    Active : No    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    Additional Comments: right handed         Social Determinants of Health     Financial Resource Strain: Not on file   Food Insecurity: Not on file   Transportation Needs: Not on file   Physical Activity: Not on file   Stress: Not on file   Social Connections: Not on file   Intimate Partner Violence: Not on file   Housing Stability: Not on file           THERAPY, [address]:    [x] Pain medication before therapies     [x]  Check orthostatic BP and monitor heart rate and medications effects with therapy      [x]  Ambulate to the bathroom in room    [x]  Add scheduled rest beaks     [x]  In room therapies as needed      Discharge date set for:              3/12/23      Home with:   wife  with help from   wife            And: Home Health Care:     [x]  PT    [x]  OT    []  ST   [x]  Aide       [x]  RN                    Equipment:  [employer],        At D/C their function is goaled at:   PT:Long Term Goal 1: indep bed mobility- Met  Long Term Goal 2: Pt will demonstrate transfers indep with safest AD- Met  Long Term Goal 3: Pt will demonstrate amb >/= 150ft supervision with safest AD - indep for 30 feet- Met 50' Independent 150' Supervision  Long Term Goal 4: Pt will demonstrate stair negotiation 3 steps without rails with safest AD SBA- Utilizes rails to simulate using freezer and door jam  Long Term Goal 5: Pt will demonstrate pantoja balance assessment >/= 45/56 for decreased risk for falls. - Not met 39/56  OT:Eating  Assistance Needed: Supervision or touching assistance  CARE Score: 4  Discharge Goal: Independent, Oral Hygiene  Assistance Needed: Supervision or touching assistance  CARE Score: 4  Discharge Goal: Independent, [address] needed: Substantial/maximal assistance  CARE Score: 2  Discharge Goal: Supervision or touching assistance, Shower/Bathe Self  Assistance Needed: Substantial/maximal assistance  CARE Score: 2  Discharge Goal: Supervision or touching assistance  Upper Body Dressing  Assistance Needed: Partial/moderate assistance  CARE Score: 3  Discharge Goal: Supervision or touching assistance, Lower Body Dressing  Assistance Needed: Substantial/maximal assistance  CARE Score: 2  Discharge Goal: Supervision or touching assistance, Putting On/Taking Off Footwear  Assistance Needed: Dependent  CARE Score: 1  Discharge Goal: Supervision or touching assistance, Toilet Transfer  Assistance needed: Partial/moderate assistance  CARE Score: 3  Discharge Goal: Supervision or touching assistance  SP:Long Term Goals  Time Frame for Long Term Goals: 2 weeks or LOS until goals are met. Goal 1: Pt will demonstrate functional cognitive-linguistic abilities in all opportunities with modified independence in order to safely complete ADLs. Long-term Goals  Timeframe for Long-term Goals: n/a           From a cognitive standpoint they will need:        24 hr vs daily transitioning to supervision  -->progress to occasional             Significant problems/ barriers to functional progress include: Pt is at a high risk for functional loss,      []  Acute infection/UTI    []  Low BP's     []  COPD flare-up   []  Uncontrolled blood sugar     []  Progressive anemia     [x]  poor endurance    []  Severe pain     []  Impaired mental status    []  Urinary incontinence    []  Bowel incontinence           Plan to correct barriers to functional progress: Add scheduled rest breaks, CoQ 10 and Vit B 12, control pain by using ice Lidoderm rest and massage as well as pain medications prior to therapy. Spread therapy of 15 hours out over a 7 day window to accommodate rest breaks and medical interventions. Patient seems to be making fair to good response to these interventions. Based on a comprehensive evaluation of the above, the individualized therapy and Discharge plan will be:    -Times stated are an average that will be varied based on the patient's daily need. PT   1 1/2  hrs/day 5-7 days per wk      OT   1 1/2 hrs per day 5-7 days per wk     ST  1/2    hrs /day 3-5 days per week       Estimated LOS   1-2 week(s)    -Overall functional prognosis:     [x]  Good    []  Fair    []  Poor     -Medical Prognosis:   [x]  Good    []  Fair    []  Poor    This patient was made aware of the discussion of Plan of Care, their projected dicharge date and their projected function at discharge. Patti Muñoz, DO

## 2023-03-13 NOTE — PROGRESS NOTES
Subjective: The patient complains of moderate to severe acute on chronic progressive fatigue and  PRIETO partially relieved by rest, medications, PT,  OT,   SLP and rest and exacerbated by recent illness  . Patient had initially been treated at [facility] [place] where a nerve stimulator placement was attempted but delayed due to high INR. Once the procedure was finally initiated it was aborted because of possible CSF leak. Patient became shortness of breath postprocedure but this was found to be largely unrelated to the procedure but related to a large loculated pleural effusion. Patient was transferred to [facility] for thoracotomy and VATS procedure performed by Dr. Leah Quiñones. rn  Ultrasound staff called by unit secretary RE: STAT order placed last night. Electronically signed by Juli Miller RN on 3/12/2023 at 9:32 AM     Dr. Glenny Castaneda re-consulted for warfarin dosing, in to see pt and orders received. Dr. Shelia Ballard in to see pt, Hem/Onc consulted, orders received. Phlebotomist in to draw type and screen. IV access obtained by RN in RAC, 20 gauge saline lock with GBR and flushes well. Pt and family aware of plan of care. Electronically signed by Juli Miller RN on 3/12/2023 at 6:52 PM    ROS x10: The patient also complains of severely impaired mobility and activities of daily living. Otherwise no new problems with vision, hearing, nose, mouth, throat, dermal, cardiovascular, GI, , pulmonary, musculoskeletal, psychiatric or neurological. See also Acute Rehab PM&R H&P. Vital signs:  /66   Pulse 60   Temp 98.4 °F (36.9 °C)   Resp 16   Ht 5' 7\" (1.702 m)   Wt 263 lb 6.4 oz (119.5 kg)   SpO2 95%   BMI 41.25 kg/m²   I/O:   PO/Intake:  fair PO intake,   Reg diet    Bowel:   continent    Bladder: continent    bishop  General:  Patient is well developed,   adequately nourished, and    well kempt.      HEENT:    Pupils equal, hearing intact to loud voice, external inspection of ear and nose benign. Inspection of lips, tongue and gums  thrush    Musculoskeletal: No significant change in strength or tone. All joints stable. Inspection and palpation of digits and nails show no clubbing, cyanosis or inflammatory conditions. Neuro/Psychiatric: Affect: flat but pleasant. Alert and oriented to person, place and situation with  min cues. No significant change in deep tendon reflexes or sensation  Lungs:  Diminished, CTA-B. Respiration effort is   normal at rest.   Pleurx cath dressing D&I L Side. Heart:   S1 = S2,    irreg a fib. Abdomen:  Soft, non-tender, no enlargement of liver or spleen. Extremities:   mod lower extremity edema    Skin:   Intact to general survey,  Pleurx cath dressing D&I L Side. Rehabilitation:  Physical Therapy:   Bed mobility:  Bed mobility  Rolling to Left: Minimal assistance (02/26/23 1242)  Rolling to Right: Minimal assistance (02/26/23 1242)  Supine to Sit: Modified independent (03/12/23 1137)  Sit to Supine: Moderate assistance (02/26/23 1242)  Bed Mobility Comments: HOB flat; cues to avoid breath holding (02/26/23 1242)  Roll Left  Assistance Level: Modified independent (03/11/23 1608)  Skilled Clinical Factors: HOB flat with use of bed rail (03/11/23 1608)  Roll Right  Assistance Level: Modified independent (03/11/23 1608)  Skilled Clinical Factors: With use of bed rail (03/11/23 1608)  Sit to Supine  Assistance Level: Independent (03/11/23 1608)  Skilled Clinical Factors: Increased time to complete (03/06/23 1605)  Supine to Sit  Assistance Level: Independent (03/11/23 1608)  Skilled Clinical Factors: Cues for breathing throughout transfer (03/06/23 1605)  Scooting  Assistance Level:  Independent (03/11/23 1608)  Transfers:  Transfers  Sit to Stand: Modified independent (03/12/23 1150)  Stand to Sit: Modified independent (03/12/23 1150)  Comment: poor use of L LE during sit to stand; [employer] used (02/26/23 1243)  Transfers  Surface: Standard toilet;From chair with arms (03/11/23 0023)  Additional Factors: Set-up (03/11/23 3974)  Device: Walker (ww) (03/11/23 3627)  Sit to Stand  Assistance Level: Independent (03/11/23 1608)  Skilled Clinical Factors: demonstrated quality movement and efficiency, standing on first try (03/11/23 0000)  Stand to Sit  Assistance Level: Independent (03/11/23 1608)  Skilled Clinical Factors: cont to complete safely without vc (03/11/23 0223)  Bed To/From Chair  Technique: Stand step (03/09/23 1219)  Assistance Level: Independent (03/11/23 1608)  Skilled Clinical Factors: Completes with [employer] (03/08/23 1613)  Car Transfer  Assistance Level: Independent (03/11/23 1608)  Skilled Clinical Factors: Improved technique and safety (03/11/23 1608)  Gait:   Ambulation  Surface: Level tile (03/12/23 1147)  Device: Rolling Walker (03/12/23 1147)  Other Apparatus: O2 (03/02/23 1406)  Assistance: Supervision (03/12/23 1147)  Quality of Gait: Downward gaze, decreased Juanito Heel strike Lt < Rt, no LOB (03/12/23 1147)  Gait Deviations: Slow Jefe; Increased MENA; Decreased step length;Decreased step height (02/26/23 1244)  Distance: 120ft x2 (03/12/23 1147)  Ambulation  Surface: Level surface (03/11/23 1608)  Device: Rolling walker (03/11/23 1608)  Distance: 100 feet (03/11/23 1608)  Activity: Within Unit (03/11/23 1608)  Activity Comments: demonstrates safety and min vc needed for posture (03/11/23 1608)  Additional Factors: Verbal cues (03/10/23 1209)  Assistance Level:  Independent (03/10/23 1209)  Gait Deviations: Slow jefe;Decreased heel strike left;Decreased step length bilateral (03/11/23 1608)  Skilled Clinical Factors: Independent and good safety with short distance gait (03/09/23 1219)  Stairs:  Stairs/Curb  Stairs?: Yes (03/12/23 1147)  Stairs  # Steps : 4 (03/12/23 1147)  Stairs Height: 6\" (03/12/23 1147)  Rails: Right ascending (03/12/23 1147)  Assistance: Stand by assistance (03/12/23 1147)  Comment: Non reciprocal pattern (03/12/23 1147)  Stairs  Stair Height:  (nt d/t time limits) (03/11/23 1608)  Device: One handrail (03/10/23 1209)  Number of Stairs: 4 (03/10/23 1209)  Additional Factors: Verbal cues; Non-reciprocal going down;Reciprocal going up (03/10/23 1209)  Assistance Level: Supervision (03/09/23 1219)  Skilled Clinical Factors: Good safety (03/10/23 1209)  W/C mobility:       Occupational Therapy:   Hand Dominance: Right  ADL  Feeding: Stand by assistance (02/26/23 1201)  Grooming: Minimal assistance (02/26/23 1201)  UE Bathing: Minimal assistance (02/26/23 1201)  LE Bathing: Maximum assistance (02/26/23 1201)  LE Bathing Skilled Clinical Factors: periare only per pt request (02/26/23 1201)  UE Dressing: Moderate assistance (02/26/23 1201)  LE Dressing: Maximum assistance (02/26/23 1201)  Toileting: Maximum assistance (02/26/23 1201)  Additional Comments: sponge bath ADL completed sitting EOB and patially on toilet. (02/26/23 1201)  Toilet Transfers  Toilet - Technique: Ambulating (02/26/23 1207)  Equipment Used: Grab bars (02/26/23 1207)  Toilet Transfer: Moderate assistance (02/26/23 1207)  Toilet Transfers Comments: CGA on ModA off (02/26/23 1207)          Speech Therapy:      Comprehension:  (AUditory comprehension is [facility])  Verbal Expression:  (Supervised assist required for high level naming and reasoning tasks secondary to reduced thought organization)  Diet/Swallow:           Compensatory Swallowing Strategies :  Alternate solids and liquids, Eat/Feed slowly, Upright as possible for all oral intake, Small bites/sips          Lab/X-ray studies reviewed, analyzed and discussed with patient and staff:   Recent Results (from the past 24 hour(s))   Protime-INR    Collection Time: 03/12/23  4:54 AM   Result Value Ref Range    Protime 28.1 (H) 12.3 - 14.9 sec    INR 2.6    CBC with Auto Differential    Collection Time: 03/12/23  4:54 AM   Result Value Ref Range    WBC 6.9 4.8 - 10.8 K/uL    RBC 2.84 (L) 4.70 - 6.10 M/uL    Hemoglobin 7.8 (L) 14.0 - 18.0 g/dL    Hematocrit 24.2 (L) 42.0 - 52.0 %    MCV 85.2 79.0 - 92.2 fL    MCH 27.3 27.0 - 31.3 pg    MCHC 32.1 (L) 33.0 - 37.0 %    RDW 19.1 (H) 11.5 - 14.5 %    Platelets 802 877 - 535 K/uL    Neutrophils % 73.9 %    Lymphocytes % 9.7 %    Monocytes % 11.4 %    Eosinophils % 4.7 %    Basophils % 0.3 %    Neutrophils Absolute 5.1 1.4 - 6.5 K/uL    Lymphocytes Absolute 0.7 (L) 1.0 - 4.8 K/uL    Monocytes Absolute 0.8 0.2 - 0.8 K/uL    Eosinophils Absolute 0.3 0.0 - 0.7 K/uL    Basophils Absolute 0.0 0.0 - 0.2 K/uL   Basic Metabolic Panel    Collection Time: 03/12/23  4:54 AM   Result Value Ref Range    Sodium 141 135 - 144 mEq/L    Potassium 4.3 3.4 - 4.9 mEq/L    Chloride 106 95 - 107 mEq/L    CO2 25 20 - 31 mEq/L    Anion Gap 10 9 - 15 mEq/L    Glucose 102 (H) 70 - 99 mg/dL    BUN 21 8 - 23 mg/dL    Creatinine 1.14 0.70 - 1.20 mg/dL    Est, Glom Filt Rate >60.0 >60    Calcium 8.6 8.5 - 9.9 mg/dL   Hepatic Function Panel    Collection Time: 03/12/23  4:54 AM   Result Value Ref Range    Total Protein 5.4 (L) 6.3 - 8.0 g/dL    Albumin 2.7 (L) 3.5 - 4.6 g/dL    Alkaline Phosphatase 75 35 - 104 U/L    ALT 35 0 - 41 U/L    AST 21 0 - 40 U/L    Total Bilirubin <0.2 0.2 - 0.7 mg/dL    Bilirubin, Direct <0.2 0.0 - 0.4 mg/dL    Bilirubin, Indirect see below 0.0 - 0.6 mg/dL   TYPE AND SCREEN    Collection Time: 03/12/23  6:16 PM   Result Value Ref Range    ABO/Rh A POS     Antibody Screen NEG    PREPARE RBC (CROSSMATCH), 1 Units    Collection Time: 03/12/23  6:16 PM   Result Value Ref Range    Product Code Blood Bank [id number]     Description Blood Bank Red Blood Cells, Leuko-reduced     Unit Number [id number]     Dispense Status Blood Bank selected        3/5/23-CT  Left shoulder   Advanced osteoarthritis of the glenohumeral joint with intra-articular   ossified bodies. 2.  No acute fracture identified.        3.  Calcific tendinitis versus bursitis of the rotator cuff tendon,   subacromial subdeltoid bursa. XR ABDOMEN   2/18/2023   1. Complete opacification of left hemithorax compatible with some combination of pleural effusion and atelectasis. Secondary obscuration of left heart border. 2.  Nonobstructive bowel gas pattern. No acute abdominal disease identified. 3.  Probable left renal stone. CT CHEST   2/23/2023 : Mediastinum: Thyroid is homogeneous in appearance. Vascular calcifications seen within the coronary vessels. Cardiac chambers are mildly enlarged. Pacer leads in satisfactory position. Moderate-sized pericardial effusion. Bulky adenopathy identified in the left hilar region likely reactive. Small lymph nodes seen scattered throughout the mediastinum likely reactive. Atherosclerotic disease seen diffusely throughout the thoracic aorta. Lungs/pleura: There is small chest tube identified in place on the left with small left pleural effusion. Airspace consolidation seen within the left upper and lower lung fields suggesting superimposed infiltrate such as pneumonia. Mild increased markings identified at the right lung base to suggest atelectatic change. Trace pneumothorax identified anteriorly. Upper Abdomen: Stones in the gallbladder. Small hiatal hernia. Soft Tissues/Bones: Multilevel degenerative changes seen within the spine. No acute chest wall abnormality. Indwelling chest tube identified on the left with small left pleural effusion and trace anterior pneumothorax. Consolidation seen in airspace disease throughout the left upper and lower lobes to suggest a multifocal superimposed pneumonia. Soft tissue density seen in the hilar region to suggest possible reactive adenopathy. Moderate-sized pericardial effusion. Minimal atelectatic changes seen at the right lung base. Incidental stones in the gallbladder with small hiatal hernia. XR CHEST   2/19/2023   1. Minimal partial clearing of the left lung.   The previously noted left pleural effusion is smaller   2. Stable position of the left chest tube   3. The right lung is clear. XR CHEST  2023   1. Apparent interval placement of left chest tube catheter. No pneumothorax. 2.  Persistent opacification of the left hemithorax. XR CHEST  2023   1. Near complete whiteout of the left hemithorax likely represent a combination of partial collapse of the left lung and large pleural effusion   2. Cardiomegaly   3. The right lung is grossly clear. US DUP UPPER EXTREMITY RIGHT VENOUS  2023   No evidence of DVT. FLUORO FOR SURGICAL PROCEDURES  2023  12 spot images of the lumbar spine were obtained. Intraprocedural fluoroscopic spot images as above. See separate procedure report for more information. EGD 2023  [id number] [place] [address] Patient: Ranjana Orellana MRN: [id number] : 1935 Account: Gender: Male Age: 82 Years Procedure: Upper GI endoscopy Date: 2023 Attending Physician: Jordin Gimenez Indications:        -  Anemia        -  Melena Medications:        -  Monitored Anesthesia Care Complications:        -  No immediate complications. Estimated Blood Loss:        -  Estimated blood loss: none. Procedure:        - The Gastroscope was introduced through the mouth and advanced to the second           part of the duodenum.        -  The patient tolerated the procedure well. Findings:        -  A 2 cm hernia was found.        -  Esophagogastric landmarks were identified: the gastroesophageal junction           was found at 36 cm, the lower esophageal sphincter was found at 38 cm and the           upper extent of the gastric folds was found at 36 cm from the incisors. -  Patchy moderate inflammation characterized by erythema was found in the           gastric antrum and in the gastric body.         -  The examined duodenum was normal.        -  The cardia and gastric fundus were normal on retroflexion.        - No old or fresh blood noted Impression:        - 2 cm hernia. -  Esophagogastric landmarks identified.        -  Gastritis. -  Normal examined duodenum.        -  No specimens collected. - No old or fresh blood noted Recommendation:        -  Put patient on a clear liquid diet starting today. -  Continue present medications. -     - 88946, Esophagogastroduodenoscopy, flexible, transoral; diagnostic,           including collection of specimen(s) by brushing or washing, when performed           (separate procedure) Diagnosis Code(s):        - D64.9, Anemia, unspecified        - K92.1, Melena (includes Hematochezia)        - K44.9, Diaphragmatic hernia without obstruction or gangrene        - K29.70, Gastritis, unspecified, without bleeding           Previous extensive, complex labs, notes and diagnostics reviewed and analyzed. ALLERGIES:    Allergies as of 02/25/2023 - Fully Reviewed 02/25/2023   Allergen Reaction Noted    Benadryl [diphenhydramine hcl] Anxiety 01/31/2012    Diphenhydramine Anxiety 05/09/2018      (please also verify by checking [facility])      Complex Physical Medicine & Rehab Issues Assess & Plan:   Severe abnormality of gait and mobility and impaired self-care and ADL's secondary to progressive cardiopulmonary debility. Functional and medical status reassessed regarding patients ability to participate in therapies and patient found to be able to participate in acute intensive comprehensive inpatient rehabilitation program including PT/OT to improve balance, ambulation, ADLs, and to improve the P/AROM. Therapeutic modifications regarding activities in therapies, place, amount of time per day and intensity of therapy made daily. In bed therapies or bedside therapies prn. Bowel and Bladder dysfunction  , Neurogenic bowel and bladder:  frequent toileting, ambulate to bathroom with assistance, check post void residuals.   Check for C.difficile x1 if >2 loose stools in 24 hours, continue bowel & bladder program. Monitor bowel and bladder function. Lactinex 2 PO every AC. MOM prn, Brown Bomb prn, Glycerin suppository prn, enema prn. Encourage therapy and nursing to co-treat and problem solve re continence. Severe back pain, lumbar, as well as generalized OA pain: reassess pain every shift and prior to and after each therapy session, give prn Tylenol and consider scheduled Tylenol, modalities prn in therapy, masage, Lidoderm, K-pad prn. Consider scheduled AM pain meds. Consult Ortho for left shoulder injection  Skin healing  ears and breakdown risk:  continue pressure relief program.  Daily skin exams and reports from nursing. Fatigue due to nutritional and hydration deficiency: Add and titrate vitamin B12 vitamin D and CoQ10 continue to monitor I&Os, calorie counts prn, dietary consult prn. Add healthy snack at night. Acute episodic insomnia with situational adjustment disorder:  prn Ambien, monitor for day time sedation. Falls risk elevated:  patient to use call light to get nursing assistance to get up, bed and chair alarm. Elevated DVT risk: progressive activities in PT, continue prophylaxis JOB hose, elevation and meds-see MAR. Complex discharge planning: Discharge March 12, 2023 home with his wife and home health care. Weekly team meeting every Monday to re-assess progress towards goals, discuss and address social, psychological and medical comorbidities and to address difficulties they may be having progressing in therapy. Patient and family education is in progress. The patient is to follow-up with their family physician after discharge.         Complex Active General Medical Issues that complicate care Assess & Plan:    Mesothelioma with dry cough-titrate O2, aerosols, follow-up with Dr. Sanjuanita Arana, drain pleural Dex daily versus 3 times a week per protocols from pulmonology and lung surgery consult  Mixed hyperlipidemia,  Atherosclerosis of native artery of both lower extremities with intermittent claudication, Atrial fibrillation, Venous insufficiency-Acute rehab to monitor heart rate and rhythm with the option of telemetry and the effects of chronotropic medication with respect to increasing physical activity and exercise in PT, OT, ADLs with medication titration to lowest effective dosing. Continue blood signs every shift focusing on heart rate, rhythm and blood pressure checks with orthostatic checks-monitoring the effect of exercise, therapy and posture. Consult hospitalist for backup medical and adjust/add medications (aspirin, Lasix, Lopressor, Crestor, Coumadin, bridging Lovenox). Monitor heart rate and blood pressure as well as medications effects on vital signs before during and after therapy with especial focus on preventing orthostasis and falls risk. Recheck CBC and BMP. Gastritis without bleeding-Elevate head of bed after meals, monitor stools for blood, lowest effective dose of PPI, consider Tums. Pneumonia of both lungs due to infectious organism-Merrem Acute rehab for endurance traing with Pulse Ox to monitoring oxygen saturation and heart rate with O2 titration to lowest effective dose. Pulse oximeter checks to shift and at HS to dose and titrate oxygen and aerosol treatments monitor for nocturnal hypoxemia, monitor vital signs, oxygen prn. Focus on energy conservation. eft a Pleurx tube in O2   drain  Progressive anemia with history of GERD-and chronic anticoagulation for cardiac issues -elevate head of bed after meals, monitor stools for blood, lowest effective dose of PPI, consider Tums. Anticoagulation coumadinization for [id number] pharmacist regarding INR management    Acute kidney failure-Eliminate toxic medications, monitor I's and O's focusing on urine output, recheck BMP. Spinal stenosis of lumbar region with neurogenic claudication    Balance disorder-focus on balance and therapy     Focus on emotional health and caregiver involvement in care.   Transition to less frequent labs now that his H&H is stable      Cont supportive care, pulmonary   Hold dc home  U/S abdomen negative for ascietes  Coumadin per cardiology 1mg daily  Discussed anemia with heme onc, will transfuse 1 unit PRBC today, fe studies sent already  Consent from wife, daughter and pt    ConcepcionElizabeth Jimenez, CARLOS., PM&R     Attending    [address]

## 2023-03-13 NOTE — CONSULTS
Hematology/Oncology Consult  Encounter Date: 3/13/2023 12:37 PM    Mr. Nancy Lam is a 82 y.o. male  : 1935  MRN: [id number]  Acct Number: [de-identified]  Requesting Provider: Josefa Bain DO    Reason for request: anemia ; possible need for RBC transfusion. CONSULTANT: Inocencia Dyer MD    HPI: The  pt has mesothelioma diagnosed in 2022 for which he received radiation tx to the left chest  at [facility] in [facility][note 3] . He also has hx of  polycythemia (unclear whether P.vera or secondary  polycythemia)  for several years under the care of Dr. Praneeth Troy with intermittent therapeutic phlebotomies. He was hospitalized at [facility] for dark stool with anemia and heme positive stool. He had  GI evaluation done but  EGD and colonoscopy were not done at that time because he did not have active  bleeding. He developed  SOB and was found to have large left pleural effusion in 2023  and was admitted at [facility] and subsequently transferred to [facility] where he underwent Pleurx catheter  placement in his left chest . He had improvement in his SOB after the bloody pleural fluid was drained. He was noted to have worsening of his anemia and was found to have Fe deficiency for which he was given 1 dose of IV Venofer 200 mg on 2023. He had EGD done on 23 by Dr. Nayana Potter which revealed  patchy  moderate  inflammation with erythema in the gastric antrum  but did not show  old or fresh blood. He had easy fatigue which has improved. No headache or dizziness. No chest pain  or cough . His SOB has improved. No abd pain or N/V. He had melena orior to admission in 2023  buit no BRBPR or gross hematuria or epistaxis.     Patient Active Problem List   Diagnosis    Ventral hernia    Ventral hernia, recurrent    Polycythemia    Spinal stenosis of lumbar region with neurogenic claudication    Atherosclerosis of native artery of both lower extremities with intermittent claudication ([address] Atrial fibrillation (HCC)    BPH with obstruction/lower urinary tract symptoms    Congenital cystic kidney disease    Venous insufficiency    Intermittent claudication (HCC)    Transient ischemic attack    Sleep apnea    Rosacea    Fuchs' corneal dystrophy    Excessive daytime sleepiness    Hx of blood clots    Former smoker    Lumbar stenosis with neurogenic claudication    Congestive heart failure (HCC)    Lumbar spondylosis    Balance disorder    Postlaminectomy syndrome, lumbar region    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HCC)    Mixed hyperlipidemia    Sick sinus syndrome (HCC)    Presence of cardiac pacemaker    Lumbar post-laminectomy syndrome    Pleural effusion    Melena    Gastritis without bleeding    Impaired mobility and activities of daily living dt CP debility    Pneumonia of both lungs due to infectious organism    Acute kidney failure (HCC)    Acquired absence of lung    Gastrointestinal hemorrhage    SOB (shortness of breath)    Arthritis of shoulder region, left     Past Medical History:   Diagnosis Date    A-fib (HCC)     approx 1 year ago-cardioverted    Arthritis     Baker's cyst of knee, left     Cancer (HCC)     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    Cerebral artery occlusion with cerebral infarction (HCC) 2000    TIA sx felt funny --     Congestive heart failure (HCC) 03/08/2022    Coronary artery disease     HTN (hypertension)     meds > 20 yrs    Hx of blood clots 2012    DVT left leg     Hyperlipidemia     meds > 20 yrs    Pacemaker 07/19/2022    Polycythemia     Torn meniscus     left knee     Past Surgical History:   Procedure Laterality Date    BACK SURGERY      COLONOSCOPY  2009    COLONOSCOPY  2012    CORONARY ANGIOPLASTY WITH STENT PLACEMENT  10/2006    x 1 cardiac stent    ENDOSCOPY, COLON, DIAGNOSTIC      EYE SURGERY      [place] with IOL OU    HERNIA REPAIR  03/2013    redo ca mesh in Oct.2013 -- umbilical hernia    JOINT REPLACEMENT  Bilateral  &     Bilateral TKR    KNEE SURGERY Left      arthroscopic surgery to repair meniscus of left knee    LAMINECTOMY N/A 2021    RIGHT BILATERAL L2-3 3-4 4-5 MICRODECOMPRESSION performed by Noel Perez MD at [facility]    PAIN MANAGEMENT PROCEDURE N/A 2023    ATTEMPTED SPINAL CORD STIMULATOR PERMANENT PLACEMENT performed by Rima Brennan MD at [address] N/A 1/10/2023    SPINAL CORD STIMULATOR TRIAL performed by Rima Brennan MD at [place]  age 8    UMBILICAL HERNIA REPAIR  2012    UPPER GASTROINTESTINAL ENDOSCOPY N/A 2023    EGD DIAGNOSTIC ONLY performed by Winter Clayton MD at [facility]     Family History   Problem Relation Age of Onset    Heart Attack Mother     Other Mother          in MVA    Other Father          at age 80    Other Sister          as infant    Cancer Brother     Other Brother         unknown medical hx    Other Brother          at age 61 due to accident    Cancer Daughter         colon & lung cancer    Colon Cancer Daughter     No Known Problems Son     Colon Cancer Other     Breast Cancer Other      Social History     Socioeconomic History    Marital status:      Spouse name: Ben Sandoval    Number of children: Not on file    Years of education: Not on file    Highest education level: Not on file   Occupational History    Not on file   Tobacco Use    Smoking status: Former     Packs/day: 0.50     Years: 10.00     Pack years: 5.00     Types: Cigarettes     Quit date: 1968     Years since quittin.9    Smokeless tobacco: Never   Vaping Use    Vaping Use: Never used   Substance and Sexual Activity    Alcohol use: Yes     Comment: social    Drug use: Never    Sexual activity: Not on file   Other Topics Concern    Not on file   Social History Narrative    Lives With: Spouse Ashleigh    Type of Home: House in [address] [place] [address] DRIVE    Home Layout: One level (basement)    Home Access: Stairs to enter without rails (hoolds on to door jamb)    Entrance Stairs - Number of Steps: 2    Bathroom Shower/Tub: Walk-in shower    Home Equipment: Joshua Alstrom, rolling, Rollator, Cane (uses rollator outside)    Has the patient had two or more falls in the past year or any fall with injury in the past year?: Yes (broken rib and puncture lung)    ADL Assistance: Independent    Homemaking Assistance: Needs assistance (staniding tolerance is limited)    Ambulation Assistance: Independent (ww)    Transfer Assistance: Independent    Active : No    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[employer]    Additional Comments: right handed         Social Determinants of Health     Financial Resource Strain: Not on [address]: Not on file   Transportation Needs: Not on file   Physical Activity: Not on file   Stress: Not on file   Social Connections: Not on file   Intimate Partner Violence: Not on file   Housing Stability: Not on file         Current Facility-Administered Medications   Medication Dose Route Frequency Provider Last Rate Last Admin    miconazole (MICOTIN) 2 % powder   Topical BID Coral Chase DO        warfarin (COUMADIN) tablet 1 mg  1 mg Oral Once Chao Mosesper, APRN - CNP        iron sucrose (VENOFER) injection 200 mg  200 mg IntraVENous Once Manuel Lobo MD        [START ON 3/14/2023] ferrous sulfate (IRON 325) tablet 325 mg  325 mg Oral BID  Manuel Lobo MD        folic acid (FOLVITE) tablet 1 mg  1 mg Oral Daily Manuel Lobo MD        0.9 % sodium chloride infusion   IntraVENous PRN Kristopher Henry MD        furosemide (LASIX) injection 20 mg  20 mg IntraVENous See Admin Instructions Kristopher Henry MD        acetaminophen (TYLENOL) tablet 650 mg  650 mg Oral See Admin Instructions Geovanna Juarez MD        hydrocortisone 1 % cream   Topical BID Prisca Topete DO   Given at 03/12/23 2140    docusate sodium (COLACE) capsule 100 mg  100 mg Oral Nightly Marysolserenity Woodruff APRN - CNP   100 mg at 03/12/23 2140    camphor-menthol-methyl salicylate (BENGAY ULTRA STRENGTH) 4-10-30 % cream   Topical TID PRN Jackie Ponce MD        acetaminophen (TYLENOL) tablet 650 mg  650 mg Oral TID Jackie Ponce MD   650 mg at 03/13/23 0811    guaiFENesin-dextromethorphan (ROBITUSSIN DM) 100-10 MG/5ML syrup 10 mL  10 mL Oral Q4H PRN Buster Batters, APRN - CNP   10 mL at 03/04/23 1724    hydrocortisone (ANUSOL-HC) suppository 25 mg  25 mg Rectal BID PRN Sasha Juarez MD   25 mg at 03/04/23 1348    potassium chloride (MICRO-K) extended release capsule 10 mEq  10 mEq Oral Daily Buster Batters, APRN - CNP   10 mEq at 03/13/23 0812    lidocaine 4 % external patch 3 patch  3 patch TransDERmal Daily PRN Coral Scullin, DO   3 patch at 03/07/23 0912    polyvinyl alcohol (LIQUIFILM TEARS) 1.4 % ophthalmic solution 1 drop  1 drop Both Eyes BID PRN Coral Scullin, DO   1 drop at 03/04/23 0935    Vitamin D (CHOLECALCIFEROL) tablet 2,000 Units  2,000 Units Oral Dinner Coral Scullin, DO   2,000 Units at 03/12/23 1822    cyanocobalamin injection 1,000 mcg  1,000 mcg IntraMUSCular Weekly Coral Scullin, DO   1,000 mcg at 03/05/23 0818    coenzyme Q10 capsule 100 mg  100 mg Oral Daily Coral Scullin, DO   100 mg at 03/13/23 3348    bisacodyl (DULCOLAX) suppository 10 mg  10 mg Rectal Daily PRN Coral Scullin, DO        sodium phosphate (FLEET) rectal enema 1 enema  1 enema Rectal Daily PRN Clista DO Jazmine        warfarin placeholder: dosing by pharmacy   [facility] 13, APRN - CNP        acetaminophen (TYLENOL) tablet 650 mg  650 mg Oral Q4H PRN Margarita Hunter MD   650 mg at 03/05/23 0816    aspirin EC tablet 81 mg  81 mg Oral Daily Margarita Hunter MD   81 mg at 03/13/23 8431    furosemide (LASIX) tablet 20 mg  20 mg Oral Daily Margarita Hunter MD   20 mg at 03/13/23 0849    guaiFENesin (MUCINEX) extended release tablet 600 mg  600 mg Oral BID Yi Lees MD   600 mg at 03/13/23 1686    ipratropium-albuterol (DUONEB) nebulizer solution 1 ampule  1 ampule Inhalation Q4H PRN Yi Lees MD   1 ampule at 02/26/23 1219    melatonin disintegrating tablet 5 mg  5 mg Oral Nightly Yi Lees MD   5 mg at 03/12/23 2140    metoprolol tartrate (LOPRESSOR) tablet 25 mg  25 mg Oral BID Yi Lees MD   25 mg at 03/13/23 0811    naloxegol (MOVANTIK) tablet 25 mg  25 mg Oral QAM Yi Lees MD   25 mg at 03/13/23 0813    ondansetron (ZOFRAN-ODT) disintegrating tablet 4 mg  4 mg Oral Q8H PRN Yi Lees MD        Or    ondansetron [facility]) injection 4 mg  4 mg IntraVENous Q6H PRN Yi Lees MD        oxyCODONE (ROXICODONE) immediate release tablet 5 mg  5 mg Oral Q4H PRN Yi Lees MD   5 mg at 03/11/23 9184    Or    oxyCODONE (ROXICODONE) immediate release tablet 10 mg  10 mg Oral Q4H PRN Yi Lees MD   10 mg at 03/06/23 1546    rosuvastatin (CRESTOR) tablet 10 mg  10 mg Oral Daily Yi Lees MD   10 mg at 03/13/23 1173     Facility-Administered Medications Ordered in Other Encounters   Medication Dose Route Frequency Provider Last Rate Last Admin    sodium chloride flush 0.9 % injection 10 mL  10 mL IntraVENous PRN Heide Jeong MD   10 mL at 07/10/19 1220     Outpatient Medications Marked as Taking for the 2/25/23 encounter [facility] Encounter)   Medication Sig Dispense Refill    potassium chloride (MICRO-K) 10 MEQ extended release capsule Take 1 capsule by mouth daily 30 capsule 0    naloxegol (MOVANTIK) 25 MG TABS tablet Take 1 tablet by mouth every morning 30 tablet 0     Allergies   Allergen Reactions    Benadryl [Diphenhydramine Hcl] Anxiety    Diphenhydramine Anxiety     anxious         ROS:  Unremarkable except for symptoms mentioned in HPI.       PHYSICAL EXAMINATION:   VITAL SIGNS: /71   Pulse 61   Temp 98.4 °F (36.9 °C)   Resp 16   Ht 5' 7\" (1.702 m)   Wt 248 lb 11.2 oz (112.8 kg)   SpO2 99%   BMI 38.95 kg/m²       GENERAL: In no acute distress, well- nourished, well- developed, alert and oriented to person place and time. SKIN: Warm and dry, without jaundice, ecchymoses, or petechiae. HEENT: Normocephalic, pale conj; sclerae anicteric, oral mucosa moist without lesion or exudate in the visible oral cavity or oropharynx, no epistaxis  NECK: supple; no JVD;  symmetrical; no thyromegaly  NODES: No palpable adenopathy in the neck Levels I-V, bilateral supraclavicular fossae, axillary chains, or inguinal regions. LUNGS: Good inspiratory effort, no accessory muscle use, decreased BS over left lung base; clear BS over right LF;, no focal wheeze, rales or rhonchi. +Pleurx chest tube on left chest wall  CARDIAC: Normal HS, Regular rate and rhythm,   ABDOMINAL: soft, non-tender, no mass or organomegaly. MUSKL: No tenderness over ribs or spine;   EXTREMITIES: trace bipedal edema ; no calf tenderness  NEUROLOGIC: Gait not tested. No grossly apparent focal deficits.   PSYCH: cooperative; pt has appropriate behavior and mood    LAB RESULTS:     Latest Reference Range & Units 2/26/23 05:26 3/8/23 04:50 3/8/23 07:21 3/9/23 03:59 3/10/23 07:54 3/11/23 04:21 3/12/23 04:54   WBC 4.8 - 10.8 K/uL 8.5 10.8  8.1 7.1 7.1 6.9   RBC 4.70 - 6.10 M/uL 2.49 (L) 2.43 (L)  2.69 (L) 2.92 (L) 2.62 (L) 2.84 (L)   Hemoglobin Quant 14.0 - 18.0 g/dL 7.2 (L) 6.8 (LL) 7.2 (L) 7.2 (L) 8.0 (L) 7.3 (L) 7.8 (L)   Hematocrit 42.0 - 52.0 % 21.8 (L) 20.5 (LL) 22.3 (L) 22.7 (L) 25.0 (L) 22.3 (L) 24.2 (L)   MCV 79.0 - 92.2 fL 87.4 84.2  84.3 85.5 85.2 85.2   MCH 27.0 - 31.3 pg 29.0 28.1  26.8 (L) 27.2 27.9 27.3   MCHC 33.0 - 37.0 % 33.1 33.4  31.8 (L) 31.8 (L) 32.7 (L) 32.1 (L)   RDW 11.5 - 14.5 % 15.7 (H) 17.8 (H)  18.0 (H) 18.8 (H) 18.3 (H) 19.1 (H)   Platelet Count 508 - 400 K/uL 243 319  368 400 370 353   Neutrophils % % 77.3      73.9 Lymphocyte % % 6.9      9.7   Monocytes % % 11.6      11.4   Eosinophils % % 3.7      4.7   Basophils % % 0.5      0.3   Neutrophils Absolute 1.4 - 6.5 K/uL 6.6 (H)      5.1   Lymphocytes Absolute 1.0 - 4.8 K/uL 0.6 (L)      0.7 (L)   Monocytes Absolute 0.2 - 0.8 K/uL 1.0 (H)      0.8   Eosinophils Absolute 0.0 - 0.7 K/uL 0.3      0.3   Basophils Absolute 0.0 - 0.2 K/uL 0.0      0.0   Ferritin 30 - 400 ng/mL   516 (H)       Iron 59 - 158 ug/dL   34 (L)       Iron Saturation 20 - 55 %   17 (L)       UIBC 112 - 347 ug/dL   170       TIBC 250 - 450 ug/dL   204 (L)       FOLATE, FOLAT >4.8 ng/mL   2.9 (L)       Vitamin B-12 232 - 1245 pg/mL   1286 (H)          Latest Reference Range & Units 3/13/23 04:39   Hemoglobin Quant 14.0 - 18.0 g/dL 7.6 (L)   Hematocrit 42.0 - 52.0 % 24.1 (L)      Latest Reference Range & Units Most Recent   Retic Ct Abs 0.022 - 0.111 m/cumm 0.113 (H)  2/19/23 05:27   Retic Ct Pct 0.6 - 2.2 % 4.0 (H)  2/19/23 05:27   LL): Data is critically low  (L): Data is abnormally low  (H): Data is abnormally high     Latest Reference Range & Units 3/13/23 04:39   Prothrombin Time 12.3 - 14.9 sec 27.6 (H)   INR  2.6   (H): Data is abnormally high  Recent Labs     03/12/23  0454   GLUCOSE 102*        RADIOLOGY RESULTS:  XR ABDOMEN (KUB) (SINGLE AP VIEW)    Result Date: 2/18/2023  EXAMINATION: FOUR SUPINE XRAY VIEW(S) OF THE ABDOMEN 2/18/2023 7:02 am COMPARISON: CHEST X-RAY 02/18/2023 HISTORY: ORDERING SYSTEM PROVIDED HISTORY: abd distentnion TECHNOLOGIST PROVIDED HISTORY: Reason for exam:->abd distentnion What reading provider will be dictating this exam?->CRC FINDINGS: Complete opacification of left hemithorax compatible with some combination of pleural effusion and atelectasis. Obscuration of the left heart border. Pericardial effusion not excluded. Dual chamber transvenous pacemaker in place. The lower pelvis was not imaged. Nonobstructive bowel gas pattern.   The stomach, small bowel, and colon are nondilated. 9 mm calcification compatible with a stone at the midpole of the left kidney. Degenerative changes of the lumbar spine. 1.  Complete opacification of left hemithorax compatible with some combination of pleural effusion and atelectasis. Secondary obscuration of left heart border. 2.  Nonobstructive bowel gas pattern. No acute abdominal disease identified. 3.  Probable left renal stone. CT CHEST WO CONTRAST    Result Date: 2/23/2023  EXAMINATION: CT OF THE CHEST WITHOUT CONTRAST 2/22/2023 9:34 pm TECHNIQUE: CT of the chest was performed without the administration of intravenous contrast. Multiplanar reformatted images are provided for review. Automated exposure control, iterative reconstruction, and/or weight based adjustment of the mA/kV was utilized to reduce the radiation dose to as low as reasonably achievable. COMPARISON: None. HISTORY: ORDERING SYSTEM PROVIDED HISTORY: pleural effusion TECHNOLOGIST PROVIDED HISTORY: Reason for exam:->pleural effusion What reading provider will be dictating this exam?->CRC FINDINGS: Mediastinum: Thyroid is homogeneous in appearance. Vascular calcifications seen within the coronary vessels. Cardiac chambers are mildly enlarged. Pacer leads in satisfactory position. Moderate-sized pericardial effusion. Bulky adenopathy identified in the left hilar region likely reactive. Small lymph nodes seen scattered throughout the mediastinum likely reactive. Atherosclerotic disease seen diffusely throughout the thoracic aorta. Lungs/pleura: There is small chest tube identified in place on the left with small left pleural effusion. Airspace consolidation seen within the left upper and lower lung fields suggesting superimposed infiltrate such as pneumonia. Mild increased markings identified at the right lung base to suggest atelectatic change. Trace pneumothorax identified anteriorly. Upper Abdomen: Stones in the gallbladder. Small hiatal hernia.  Soft Tissues/Bones: Multilevel degenerative changes seen within the spine. No acute chest wall abnormality. Indwelling chest tube identified on the left with small left pleural effusion and trace anterior pneumothorax. Consolidation seen in airspace disease throughout the left upper and lower lobes to suggest a multifocal superimposed pneumonia. Soft tissue density seen in the hilar region to suggest possible reactive adenopathy. Moderate-sized pericardial effusion. Minimal atelectatic changes seen at the right lung base. Incidental stones in the gallbladder with small hiatal hernia. CT HUMERUS LEFT WO CONTRAST    Result Date: 3/5/2023  EXAMINATION: CT OF THE LEFT HUMERUS WITHOUT CONTRAST 3/5/2023 9:51 am TECHNIQUE: CT of the left humerus was performed without the administration of intravenous contrast.  Multiplanar reformatted images are provided for review. Automated exposure control, iterative reconstruction, and/or weight based adjustment of the mA/kV was utilized to reduce the radiation dose to as low as reasonably achievable. COMPARISON: None. HISTORY ORDERING SYSTEM PROVIDED HISTORY: Pain/limited mobility TECHNOLOGIST PROVIDED HISTORY: Reason for exam:->Pain/limited mobility What reading provider will be dictating this exam?->CRC FINDINGS: Bones: No evidence of acute fracture or dislocation. No aggressive appearing osseous abnormality or periostitis. Soft Tissue: No significant soft tissue edema or fluid collections. Joint: Moderate to severe osteoarthritis of the glenohumeral joint. No acute fracture or subluxation. Glenohumeral joint osteoarthritis, moderate to severe     CT SHOULDER LEFT WO CONTRAST    Result Date: 3/5/2023  EXAMINATION: CT OF THE LEFT SHOULDER WITHOUT CONTRAST 3/5/2023 9:51 am TECHNIQUE: CT of the left shoulder was performed without the administration of intravenous contrast.  Multiplanar reformatted images are provided for review.  Automated exposure control, iterative reconstruction, and/or weight based adjustment of the mA/kV was utilized to reduce the radiation dose to as low as reasonably achievable. COMPARISON: None. HISTORY ORDERING SYSTEM PROVIDED HISTORY: pain/ unable to raise arm TECHNOLOGIST PROVIDED HISTORY: Reason for exam:->pain/ unable to raise arm What reading provider will be dictating this exam?->CRC FINDINGS: Bones: Severe osteoarthritis of the glenohumeral joint. Thinning of the rotator cuff tendon without evidence of retracted tear. MRI is more sensitive for rotator cuff tendon injury. There are calcifications associated with the anterolateral rotator cuff tendon which could reflect calcific tendinitis or calcific bursitis of the subacromial subdeltoid bursa. Mild-to-moderate degenerative change of the [facility] joint. Scapula is intact. Proximal humerus shows no displaced fracture. Soft Tissue: Areas of consolidation are noted in the left upper lung. Subcutaneous tissues appear normal. Joint: Moderate to severe osteoarthritis of the glenohumeral joint. Ossified intra-articular loose body in the subcoracoid recess. 1.  Advanced osteoarthritis of the glenohumeral joint with intra-articular ossified bodies. 2.  No acute fracture identified. 3.  Calcific tendinitis versus bursitis of the rotator cuff tendon, subacromial subdeltoid bursa. XR CHEST PORTABLE    Result Date: 2/19/2023  EXAMINATION: ONE XRAY VIEW OF THE CHEST 2/19/2023 7:51 am COMPARISON: 02/18/2023 HISTORY: ORDERING SYSTEM PROVIDED HISTORY: pleural effusion TECHNOLOGIST PROVIDED HISTORY: Reason for exam:->pleural effusion What reading provider will be dictating this exam?->CRC FINDINGS: Note is made of a left chest tube. There is no left pneumothorax. There is been partial clearing of the previously noted left pleural effusion. Significant residual airspace disease as well as a residual pleural effusion is noted The right lung is clear. There is a dual lead cardiac pacer on the left.      1. Minimal partial clearing of the left lung. The previously noted left pleural effusion is smaller 2. Stable position of the left chest tube 3. The right lung is clear. XR CHEST PORTABLE    Result Date: 2/18/2023  EXAMINATION: ONE XRAY VIEW OF THE CHEST 2/18/2023 11:11 am COMPARISON: None. HISTORY: ORDERING SYSTEM PROVIDED HISTORY: chest tube placement TECHNOLOGIST PROVIDED HISTORY: Reason for exam:->chest tube placement What reading provider will be dictating this exam?->CRC FINDINGS: There is opacification of the left hemithorax. No evidence of pneumothorax. Air bronchograms are noted in the perihilar region. Since the prior study there appears to been placement of a left-sided chest tube with its tip near the left upper chest apex. Right-sided dual lead pacemaker is unchanged. Heart appears enlarged. Mild reticular opacities in the right lung are unchanged. Stable degenerative changes in the left shoulder and AC joint. 1.  Apparent interval placement of left chest tube catheter. No pneumothorax. 2.  Persistent opacification of the left hemithorax. XR CHEST PORTABLE    Result Date: 2/18/2023  EXAMINATION: ONE XRAY VIEW OF THE CHEST 2/18/2023 7:02 am COMPARISON: There are no prior recent studies available for review HISTORY: ORDERING SYSTEM PROVIDED HISTORY: loculated pleural effusion TECHNOLOGIST PROVIDED HISTORY: Reason for exam:->loculated pleural effusion What reading provider will be dictating this exam?->CRC FINDINGS: The cardiac silhouette appears enlarged. Please note the left heart border is obscured due to the left lung pathology. There is near complete whiteout of the left hemithorax. The finding could represent a combination of partial collapse of the left lung and large pleural effusion. There is no right lung infiltrate or right pleural effusion.      1. Near complete whiteout of the left hemithorax likely represent a combination of partial collapse of the left lung and large pleural effusion 2. Cardiomegaly 3. The right lung is grossly clear. US ABDOMEN LIMITED    Result Date: 3/12/2023  EXAMINATION: RIGHT UPPER QUADRANT ULTRASOUND 3/12/2023 10:36 am COMPARISON: None. HISTORY: ORDERING SYSTEM PROVIDED HISTORY: suspect ascites TECHNOLOGIST PROVIDED HISTORY: Reason for exam:->suspect ascites What reading provider will be dictating this exam?->CRC FINDINGS: Sonographic evaluation of the 4 quadrants of the abdomen demonstrate no ascites. Visualized kidneys are grossly unremarkable. No ascites visualized. US DUP UPPER EXTREMITY RIGHT VENOUS    Result Date: 2/20/2023  EXAMINATION: DUPLEX ULTRASOUND OF THE RIGHT UPPER EXTREMITY FOR DVT, 2/20/2023 5:58 pm TECHNIQUE: Duplex ultrasound using B-mode/gray scaled imaging and Doppler spectral analysis and color flow was obtained of the deep venous structures of the upper right extremity. COMPARISON: None. HISTORY: ORDERING SYSTEM PROVIDED HISTORY: right arm pain and swelling TECHNOLOGIST PROVIDED HISTORY: Reason for exam:->right arm pain and swelling What reading provider will be dictating this exam?->CRC FINDINGS: There is normal flow and compressibility of the visualized venous structures. There is no evidence of echogenic thrombus. The veins demonstrate good compressibility with normal color flow study and spectral analysis. No evidence of DVT. US DUP UPPER EXTREMITY LEFT VENOUS    Result Date: 3/5/2023  EXAMINATION: VENOUS ULTRASOUND OF THE LEFT UPPER EXTREMITY, 3/5/2023 12:26 pm TECHNIQUE: Duplex ultrasound using B-mode/gray scaled imaging and Doppler spectral analysis and color flow was obtained of the deep venous structures of the left upper extremity. COMPARISON: None. HISTORY: ORDERING SYSTEM PROVIDED HISTORY: r/o DVT TECHNOLOGIST PROVIDED HISTORY: Reason for exam:->r/o DVT What reading provider will be dictating this exam?->CRC FINDINGS: There is normal flow and compressibility of the visualized venous deep venous structures. There is no evidence of echogenic deep venous thrombus. The veins demonstrate good compressibility with normal color flow study and spectral analysis. Evaluation of the superficial venous structures demonstrates thrombus in the antecubital vein on the left involving the cephalic vein. 1.  No evidence of deep venous thrombosis. 2.  Superficial venous thrombosis in the cephalic vein/antecubital vein. FLUORO FOR SURGICAL PROCEDURES    Result Date: 2023  EXAMINATION: SPOT FLUOROSCOPIC IMAGES 2023 6:54 am TECHNIQUE: Fluoroscopy was provided by the radiology department for procedure. Radiologist was not present during examination. FLUOROSCOPY DOSE AND TYPE: Radiation Exposure Index: Fluoroscopy time equals 56.5 seconds. Total dose equals 43.68 mGy, COMPARISON: None HISTORY: ORDERING SYSTEM PROVIDED HISTORY: pain TECHNOLOGIST PROVIDED HISTORY: Reason for exam:->pain What reading provider will be dictating this exam?->CRC Intraprocedural imaging. FINDINGS: 12 spot images of the lumbar spine were obtained. Intraprocedural fluoroscopic spot images as above. See separate procedure report for more information. EGD    Result Date: 2023  [address] [place] [address] Patient: Maryam Mcgrath MRN: [id number] : 1935 Account: Gender: Male Age: 82 Years Procedure: Upper GI endoscopy Date: 2023 Attending Physician: Julio Thomason Indications:        -  Anemia        -  Melena Medications:        -  Monitored Anesthesia Care Complications:        -  No immediate complications. Estimated Blood Loss:        -  Estimated blood loss: none. Procedure:        - The Gastroscope was introduced through the mouth and advanced to the second           part of the duodenum.        -  The patient tolerated the procedure well.  Findings:        -  A 2 cm hernia was found.        -  Esophagogastric landmarks were identified: the gastroesophageal junction           was found at 36 cm, the lower esophageal sphincter was found at 38 cm and the           upper extent of the gastric folds was found at 36 cm from the incisors. -  Patchy moderate inflammation characterized by erythema was found in the           gastric antrum and in the gastric body. -  The examined duodenum was normal.        -  The cardia and gastric fundus were normal on retroflexion.        - No old or fresh blood noted Impression:        -  2 cm hernia. -  Esophagogastric landmarks identified.        -  Gastritis. -  Normal examined duodenum.        -  No specimens collected. - No old or fresh blood noted Recommendation:        -  Put patient on a clear liquid diet starting today. -  Continue present medications. - Further recommendations per inpatient team Procedure Code(s):        - 96811, Esophagogastroduodenoscopy, flexible, transoral; diagnostic,           including collection of specimen(s) by brushing or washing, when performed           (separate procedure) Diagnosis Code(s):        - D64.9, Anemia, unspecified        - K92.1, Melena (includes Hematochezia)        - K44.9, Diaphragmatic hernia without obstruction or gangrene        - K29.70, Gastritis, unspecified, without bleeding       CPT(R) - 2022 copyright [employer]. All Rights Reserved. The CPT codes, CCI edits and ICD codes generated are intended as suggestions       and were generated based on input data. These codes are preliminary and upon        review may be revised to meet current compliance and payer requirements. The provider is responsible for the final determination of appropriate codes,       and modifiers. Scope Withdrawal Time:       00:07:45 Gold Tidwell MD This document has been electronically signed.  Note Initiated:2/21/2023 Note Completed:2/21/2023 8:58 AM     .     ASSESSMENT AND PLAN  Anemia which is probably multifactorial including GI blood loss (with hx of dark stool) and bloody pleural effusion as well as folate deficiencyanemia due to chronic disease from his malignancy. EGD showed gastritis but no bleeding. R/o colorectal neoplasm or diverticular bleeding  or small bowel AVMs or angiodysplasia   --results of recent serum Fe, TIBC and ferritin levels, reticulocyte count and B12 and folate levels were checked from 2/2023 hospitalization  --pt will be given another dose of IV Venofer. --MEXH4-320 mg 1 tab BID starting tomorrow  --Folic acid 1 mg  1 tab daily  --monitor CBC and  Fe,TIBC and ferritin and  folate levels  -- he will be considered for possible RBC transfusion if there is worsening of anemia even after  IV Fe infusion and oral folate tx  -- I recommend  further GI evaluation  with colonoscopy followed by possible small bowel capsule endoscopy (can be done as out-pt)    2. The pt has mesothelioma s/p RT. He now has left pleural effusion for which Pleurx drain was recently placed. He has symptomatic improvement after drainage of the pleural fluid. --the pt will follow-up with his medical oncologist Dr. Fede Quezada at [facility] regarding treatment options including  possible chemotx or palliative care including intermittent drainage of pleural fluid  and possible Hospice referral if pt's performance status is not adequate for chemotx and his clinical condition deteriorates. Thank you, Dr. Derrick Montiel, for this consultation.     Electronically signed by Scar Ware MD on 3/13/2023 at 12:37 PM

## 2023-03-13 NOTE — PROGRESS NOTES
Perfect [facility] oncology . please evaluate CBC pt. noted with low albumin, low protein, sig. drop since admission. Dr. Yost Men wanted to eval if pt. needed unit however H&H does not meet parameters. pt. to discharge home today. Full examination provided by Dr. Henry Panchal. Notes on the chart. Wife at bedside and was able to have input to pt. Current POC. IV venofer given without adverse reaction. Pt. Pleural cath drained less than 20 cc. Of sanguinous fluid. Tolerated the procedure without complaints of discomfort. DSD applied. Incision site. Clean dry intact. Echo cardiogram complete evaluated by Dr. Winnifred Simmonds. Pt. And wife provided discharge instructions. Paper scripts for 2 meds. Pt. Was taking 20 meq of potassium prior to admission educated now taking 10 meq. All questions answered. Follow up appointments made and educated family on appointments they need to follow up on. Pharmacy brought meds available. Dr. Ricardo Orellana will be dosing coumadin,ordered for 1 mg daily. Start today. Pt. Will be receiving [facility].

## 2023-03-13 NOTE — PLAN OF CARE
Problem: Skin/Tissue Integrity  Goal: Absence of new skin breakdown  Description: 1. Monitor for areas of redness and/or skin breakdown  2. Assess vascular access sites hourly  3. Every 4-6 hours minimum:  Change oxygen saturation probe site  4. Every 4-6 hours:  If on nasal continuous positive airway pressure, respiratory therapy assess nares and determine need for appliance change or resting period. 3/13/2023 1117 by Arlene Landry, RN  Outcome: Completed educated to monitor redness to left armpit. Micotin powder perscribed. Educated to clean and dry armpit daily prior to application of micotin powder.    3/13/2023 0218 by Kody Cantu, RN  Outcome: Progressing

## 2023-03-13 NOTE — PROGRESS NOTES
Assessment completed. VSS. LBM 3/12. Denied pain but accepting of scheduled tylenol. INR 2.6- pharmacy dosed 2mg. Per report, RN was instructed to only given 1mg per rehab physician since this is the dose cardiology would like him on at home. Hemoglobin of 7.8. David Machado per rehab physician, 1 unit of PRBCs ordered. Called blood bank to check status of blood. However, per blood bank, that is not a low enough number for blood to be transfused without warranted need. There is no evidence of bleeding, there has been no significant drop in hemoglobin levels and patient is no symptomatic of any need for blood. Blood bank did also state that if there is a drop on the next h/h, it could be re-considered to transfuse blood. H/h ordered for AM. Rehab physician wanted to \"top patient off\" prior to returning home. Advised rehab physician of conversation with blood bank and was told to have hem/onc call blood bank in morning. L pleurx cath in place. Dressing c/d/I. Echo ordered per cardiology- re-assess stability of pericardial effusion. Cpap on. Qualified for home O2 per nocturnal p/o study done Friday 3/10. No distress noted. Call light within reach and bed alarm activated.   Electronically signed by Jose A Bello RN on 3/13/2023 at 3:05 AM

## 2023-03-13 NOTE — PROGRESS NOTES
CLINICAL PHARMACY NOTE: MEDS TO BEDS    Total # of Prescriptions Filled: 3   The following medications were delivered to the patient:  Antifungal Powder  Folic Acid 1 mg Tab  Iron 325 mg Tab    Additional Documentation:

## 2023-03-13 NOTE — PROGRESS NOTES
Progress Note    Date:3/13/2023       [facility]  Patient Name:Garth Maza     YOB: 1935     Age:87 y.o. Assessment        Hospital Problems             Last Modified POA    * (Principal) Impaired mobility and activities of daily living dt CP debility 2/26/2023 Yes    Mixed hyperlipidemia 2/26/2023 Yes    Gastritis without bleeding 2/26/2023 Yes    Pneumonia of both lungs due to infectious organism 2/26/2023 Yes    Acute kidney failure ([address] 2/26/2023 Yes    Arthritis of shoulder region, left 3/6/2023 Yes    Spinal stenosis of lumbar region with neurogenic claudication 2/26/2023 Yes    Atherosclerosis of native artery of both lower extremities with intermittent claudication ([address] 2/26/2023 Yes    Atrial fibrillation ([facility] 2/26/2023 Yes    Overview Signed 2/2/2021  3:01 PM by EDELMIRA Dooley - CNP     Past cardioversion         Venous insufficiency 2/26/2023 Yes    Balance disorder 2/26/2023 Yes       Plan:      *Impaired mobility and ADLs due to CP debility managed by acute rehab/Dr. Brandyn Cervantes and Dr. Austin Rodriguez    *Loculated pleural effusion-ID following; likely due to malignant mesothelioma . Left chest tube oxygen as needed, BiPAP at night, Acapella. Albumin 2.7. Oncology Dr. Morgan Rodgers to see patient today. 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 Echo today    *HTN-on metoprolol; BPs within acceptable range 123/71, HR 61    *CHF-ventricular fraction 50%; 1 Lasix 20; intake and output weekly weights    *Hyperlipidemia-Crestor 10 mg    Expected discharge today pending test results . Hospital medicine managing acute needs. Patient will need to follow-up with PCP for chronic disease management. Time spent with patient 35 minutes. Greater than 70% of time  spent focused exclusively on this patient ,reviewing  chart,  reconciling medications, &  answering questions with patient and discussing plan.           Subjective Interval History Status: improved. Patient denies chest pain, palpitations, headache, dizziness, shortness of breath, cough,fever, chills, N/V/D, and changes in appetite. Review of Systems   12 point review of systems reviewed with patient with pertinent positives listed in HPI otherwise ROS negative    Medications   Scheduled Meds:    furosemide  20 mg IntraVENous See Admin Instructions    acetaminophen  650 mg Oral See Admin Instructions    hydrocortisone   Topical BID    docusate sodium  100 mg Oral Nightly    acetaminophen  650 mg Oral TID    potassium chloride  10 mEq Oral Daily    Vitamin D  2,000 Units Oral Dinner    cyanocobalamin  1,000 mcg IntraMUSCular Weekly    coenzyme Q10  100 mg Oral Daily    warfarin placeholder: dosing by pharmacy   Other RX Placeholder    aspirin  81 mg Oral Daily    furosemide  20 mg Oral Daily    guaiFENesin  600 mg Oral BID    melatonin  5 mg Oral Nightly    metoprolol tartrate  25 mg Oral BID    naloxegol  25 mg Oral QAM    rosuvastatin  10 mg Oral Daily     Continuous Infusions:    sodium chloride       PRN Meds: sodium chloride, camphor-menthol-methyl salicylate, guaiFENesin-dextromethorphan, hydrocortisone, lidocaine, polyvinyl alcohol, bisacodyl, sodium phosphate, acetaminophen, ipratropium-albuterol, ondansetron **OR** ondansetron, oxyCODONE **OR** oxyCODONE    Past History    Past Medical History:   has a past medical history of A-fib ([facility] Arthritis, Baker's cyst of knee, left, Cancer ([facility] [facility] Cerebral artery occlusion with cerebral infarction ([facility] [facility] Congestive heart failure ([facility] [facility] Coronary artery disease, HTN (hypertension), Hx of blood clots, Hyperlipidemia, Pacemaker, Polycythemia, and Torn meniscus. Social History:   reports that he quit smoking about 54 years ago. His smoking use included cigarettes. He has a 5.00 pack-year smoking history. He has never used smokeless tobacco. He reports current alcohol use. He reports that he does not use drugs. Family History:   Family History   Problem Relation Age of Onset    Heart Attack Mother     Other Mother          in MVA    Other Father          at age 80    Other Sister          as infant    Cancer Brother     Other Brother         unknown medical hx    Other Brother          at age 61 due to accident    Cancer Daughter         colon & lung cancer    Colon Cancer Daughter     No Known Problems Son     Colon Cancer Other     Breast Cancer Other        Physical Examination      Vitals:  /71   Pulse 61   Temp 98.4 °F (36.9 °C)   Resp 16   Ht 5' 7\" (1.702 m)   Wt 248 lb 11.2 oz (112.8 kg)   SpO2 99%   BMI 38.95 kg/m²   Temp (24hrs), Av.4 °F (36.9 °C), Min:98.4 °F (36.9 °C), Max:98.4 °F (36.9 °C)      I/O (24Hr): Intake/Output Summary (Last 24 hours) at 3/13/2023 1036  Last data filed at 3/13/2023 7599  Gross per 24 hour   Intake 240 ml   Output --   Net 240 ml         CONSTITUTIONAL:  Awake, alert, no apparent distress, and appears stated age  EYES: pupils equal, round and reactive to light   NECK:  Supple   LUNGS:  No increased work of breathing, good air exchange, clear to auscultation bilaterally, no crackles or wheezing, left Chest tube   CARDIOVASCULAR:  Normal apical impulse, regular rate and rhythm  ABDOMEN:  No scars, normal bowel sounds, soft, non-distended, non-tender  MUSCULOSKELETAL:  There is no redness, warmth, or swelling of the joints. Full range of motion noted  NEUROLOGIC:  Awake, alert.   No focal deficits noted  SKIN:  No bruising or bleeding, normal skin color, texture, turgor, no redness, warmth, or swelling, no rashes, and no lesions    Labs/Imaging/Diagnostics   Labs:  CBC:  Recent Labs     23   WBC 7.1 6.9  --    RBC 2.62* 2.84*  --    HGB 7.3* 7.8* 7.6*   HCT 22.3* 24.2* 24.1*   MCV 85.2 85.2  --    RDW 18.3* 19.1*  --     353  --      CHEMISTRIES:  Recent Labs     23      K 4.3   CL 106   CO2 25   BUN 21   CREATININE 1.14   GLUCOSE 102*     PT/INR:  Recent Labs     03/11/23  0421 03/12/23  0454 03/13/23  0439   PROTIME 28.8* 28.1* 27.6*   INR 2.7 2.6 2.6     APTT:No results for input(s): APTT in the last 72 hours. LIVER PROFILE:  Recent Labs     03/12/23  0454   AST 21   ALT 35   BILIDIR <0.2   BILITOT <0.2   ALKPHOS 75       Imaging Last 24 Hours:  US ABDOMEN LIMITED    Result Date: 3/12/2023  EXAMINATION: RIGHT UPPER QUADRANT ULTRASOUND 3/12/2023 10:36 am COMPARISON: None. HISTORY: ORDERING SYSTEM PROVIDED HISTORY: suspect ascites TECHNOLOGIST PROVIDED HISTORY: Reason for exam:->suspect ascites What reading provider will be dictating this exam?->CRC FINDINGS: Sonographic evaluation of the 4 quadrants of the abdomen demonstrate no ascites. Visualized kidneys are grossly unremarkable. No ascites visualized.        Electronically signed by EDELMIRA Doe CNP on 3/13/23 at 10:36 AM EDT

## 2023-03-15 ENCOUNTER — HOSPITAL ENCOUNTER (OUTPATIENT)
Dept: GENERAL RADIOLOGY | Age: 88
Discharge: HOME | End: 2023-03-17
Payer: MEDICARE

## 2023-03-15 ENCOUNTER — OFFICE VISIT (OUTPATIENT)
Dept: CARDIOTHORACIC SURGERY | Age: 88
End: 2023-03-15
Payer: MEDICARE

## 2023-03-15 VITALS
TEMPERATURE: 98.4 F | HEIGHT: 68 IN | WEIGHT: 254 LBS | BODY MASS INDEX: 38.49 KG/M2 | HEART RATE: 82 BPM | SYSTOLIC BLOOD PRESSURE: 110 MMHG | DIASTOLIC BLOOD PRESSURE: 64 MMHG

## 2023-03-15 DIAGNOSIS — J90 PLEURAL EFFUSION: Primary | ICD-10-CM

## 2023-03-15 DIAGNOSIS — Z09 STATUS POST LUNG SURGERY, FOLLOW-UP EXAM: ICD-10-CM

## 2023-03-15 DIAGNOSIS — Z09 STATUS POST LUNG SURGERY, FOLLOW-UP EXAM: Primary | ICD-10-CM

## 2023-03-15 LAB
BLOOD BANK DISPENSE STATUS: NORMAL
BLOOD BANK PRODUCT CODE: NORMAL
BPU ID: NORMAL
DESCRIPTION BLOOD BANK: NORMAL

## 2023-03-15 PROCEDURE — G8484 FLU IMMUNIZE NO ADMIN: HCPCS | Performed by: THORACIC SURGERY (CARDIOTHORACIC VASCULAR SURGERY)

## 2023-03-15 PROCEDURE — G8427 DOCREV CUR MEDS BY ELIG CLIN: HCPCS | Performed by: THORACIC SURGERY (CARDIOTHORACIC VASCULAR SURGERY)

## 2023-03-15 PROCEDURE — G8417 CALC BMI ABV UP PARAM F/U: HCPCS | Performed by: THORACIC SURGERY (CARDIOTHORACIC VASCULAR SURGERY)

## 2023-03-15 PROCEDURE — 1111F DSCHRG MED/CURRENT MED MERGE: CPT | Performed by: THORACIC SURGERY (CARDIOTHORACIC VASCULAR SURGERY)

## 2023-03-15 PROCEDURE — 1036F TOBACCO NON-USER: CPT | Performed by: THORACIC SURGERY (CARDIOTHORACIC VASCULAR SURGERY)

## 2023-03-15 PROCEDURE — 32552 REMOVE LUNG CATHETER: CPT | Performed by: THORACIC SURGERY (CARDIOTHORACIC VASCULAR SURGERY)

## 2023-03-15 PROCEDURE — 71045 X-RAY EXAM CHEST 1 VIEW: CPT

## 2023-03-15 PROCEDURE — 1123F ACP DISCUSS/DSCN MKR DOCD: CPT | Performed by: THORACIC SURGERY (CARDIOTHORACIC VASCULAR SURGERY)

## 2023-03-15 PROCEDURE — 99212 OFFICE O/P EST SF 10 MIN: CPT | Performed by: THORACIC SURGERY (CARDIOTHORACIC VASCULAR SURGERY)

## 2023-03-15 NOTE — PROGRESS NOTES
Patient is several weeks out from having a left Pleurx catheter placed for effusion due to mesothelioma. He was recently discharged from rehab to home. His wife says that for the last 3 drainages at the Pleurx catheter he has drained essentially nothing. The patient does not report any increasing shortness of breath. Chest x-ray today is essentially stable compared to his post drain placement chest x-ray. Lungs are clear bilaterally. The Pleurx catheter insertion site was prepped with alcohol swab. Approximately 8 cc 1% lidocaine was used to infiltrate the skin and subcutaneous tissue around the insertion site. Using careful blunt dissection the felt cuff was dissected free from its attached scar tissue. The catheter was then easily removed and a fresh dressing was applied. Successful drainage of malignant effusion. Pleurx catheter was removed. I will see the patient on an as-needed basis.

## 2023-03-16 NOTE — PROGRESS NOTES
Physical Therapy  Facility/Department: [facility]  Rehabilitation Discharge note    NAME: Gloria Oglesby  : 1935  MRN: [id number]    Date of discharge: 3/13    Subjective: Pt reports he is ready for discharge    Past Medical History:   Diagnosis Date    A-fib ([facility] [address]     approx 1 year ago-cardioverted    Arthritis     Baker's cyst of knee, left     Cancer ([facility] [address]     mesothelioma - radiation treatments    Cerebral artery occlusion with cerebral infarction ([facility] [address]     TIA sx felt funny --     Congestive heart failure ([facility] [address] 2022    Coronary artery disease     HTN (hypertension)     meds > 20 yrs    Hx of blood clots     DVT left leg     Hyperlipidemia     meds > 20 yrs    Pacemaker 2022    Polycythemia     Torn meniscus     left knee     Past Surgical History:   Procedure Laterality Date    BACK SURGERY      COLONOSCOPY      COLONOSCOPY      CORONARY ANGIOPLASTY WITH STENT PLACEMENT  10/2006    x 1 cardiac stent    ENDOSCOPY, COLON, DIAGNOSTIC      EYE SURGERY      [place] with IOL OU    HERNIA REPAIR  2013    redo ca mesh in [id number] -- umbilical hernia    JOINT REPLACEMENT Bilateral  &     Bilateral TKR    KNEE SURGERY Left      arthroscopic surgery to repair meniscus of left knee    LAMINECTOMY N/A 2021    RIGHT BILATERAL L2-3 3-4 4-5 MICRODECOMPRESSION performed by Mari Guevara MD at [facility]    PAIN MANAGEMENT PROCEDURE N/A 2023    ATTEMPTED SPINAL CORD STIMULATOR PERMANENT PLACEMENT performed by Catie Estrada MD at [address] N/A 1/10/2023    SPINAL CORD STIMULATOR TRIAL performed by Catie Estrada MD at [place]  age 8    [address]  2012    UPPER GASTROINTESTINAL ENDOSCOPY N/A 2023    EGD DIAGNOSTIC ONLY performed by Alex Law MD at [facility]       Restrictions  Restrictions/Precautions  Restrictions/Precautions: Fall Risk  Position Activity Restriction  Other position/activity restrictions: pleurex on L side    Objective  Roll Left  Assistance Level: Modified independent  Skilled Clinical Factors: HOB flat with use of bed rail  Roll Right  Assistance Level: Modified independent  Skilled Clinical Factors: With use of bed rail  Sit to Supine  Assistance Level: Independent  Supine to Sit  Assistance Level: Independent  Scooting  Assistance Level: Independent    Sit to Stand  Assistance Level: Independent  Stand to Sit  Assistance Level: Independent  Bed To/From Chair  Assistance Level: Independent     Ambulation  Surface: Carpet  Device: Rolling walker  Distance: 200'  Activity: Within Unit  Activity Comments: Good safety reciprocal pattern  Additional Factors: Verbal cues  Assistance Level: Supervision  Gait Deviations: Slow jefe;Decreased heel strike left;Decreased step length bilateral  Activity comments: Supervision with extended distance due to SOB/ fatigue     Stairs  Stair Height: 6''  Device: One handrail  Number of Stairs: 4  Additional Factors: Verbal cues; Non-reciprocal going down;Reciprocal going up  Skilled Clinical Factors: Good safety             Outcomes Measures:  Mclaughlin Balance Score: 42  Timed Up and Go: 36.6 (With ww)       Pt/ family education/training: Pt has been educated throughout his stay . He is inconsistent with his level of independence. Pts wife was schedule for training however di not attend. Pt was provided with HEP    Assessment:Pt demonstrated good gains throughout his stay. He is not completely indep but has met goals as listed below.        LTG established:  Long Term Goal 1: indep bed mobility- Met  Long Term Goal 2: Pt will demonstrate transfers indep with safest AD- Met  Long Term Goal 3: Pt will demonstrate amb >/= 150ft supervision with safest AD - indep for 30 feet- Met 50' Independent 150' Supervision  Long Term Goal 4: Pt will demonstrate stair negotiation 3 steps without rails with safest AD SBA- Utilizes rails to simulate using freezer and door jam  Long Term Goal 5: Pt will demonstrate pantoja balance assessment >/= 45/56 for decreased risk for falls. - Not met 39/56    Discharge Plan:  D/c to home with follow up PT recommended as well as assist for mobility initially      Electronically signed by Branden Rodriguez PT on 3/16/2023 at 3:47 PM

## 2023-03-20 LAB
ALANINE AMINOTRANSFERASE (SGPT) (U/L) IN SER/PLAS: 14 U/L (ref 10–52)
ALBUMIN (G/DL) IN SER/PLAS: 3.6 G/DL (ref 3.4–5)
ALKALINE PHOSPHATASE (U/L) IN SER/PLAS: 68 U/L (ref 33–136)
ANION GAP IN SER/PLAS: 14 MMOL/L (ref 10–20)
ASPARTATE AMINOTRANSFERASE (SGOT) (U/L) IN SER/PLAS: 16 U/L (ref 9–39)
BASOPHILS (10*3/UL) IN BLOOD BY AUTOMATED COUNT: 0.04 X10E9/L (ref 0–0.1)
BASOPHILS/100 LEUKOCYTES IN BLOOD BY AUTOMATED COUNT: 0.4 % (ref 0–2)
BILIRUBIN TOTAL (MG/DL) IN SER/PLAS: 0.3 MG/DL (ref 0–1.2)
CALCIUM (MG/DL) IN SER/PLAS: 9.4 MG/DL (ref 8.6–10.3)
CARBON DIOXIDE, TOTAL (MMOL/L) IN SER/PLAS: 26 MMOL/L (ref 21–32)
CHLORIDE (MMOL/L) IN SER/PLAS: 102 MMOL/L (ref 98–107)
CREATININE (MG/DL) IN SER/PLAS: 1.3 MG/DL (ref 0.5–1.3)
EOSINOPHILS (10*3/UL) IN BLOOD BY AUTOMATED COUNT: 0.26 X10E9/L (ref 0–0.4)
EOSINOPHILS/100 LEUKOCYTES IN BLOOD BY AUTOMATED COUNT: 2.3 % (ref 0–6)
ERYTHROCYTE DISTRIBUTION WIDTH (RATIO) BY AUTOMATED COUNT: 18.6 % (ref 11.5–14.5)
ERYTHROCYTE MEAN CORPUSCULAR HEMOGLOBIN CONCENTRATION (G/DL) BY AUTOMATED: 29.4 G/DL (ref 32–36)
ERYTHROCYTE MEAN CORPUSCULAR VOLUME (FL) BY AUTOMATED COUNT: 92 FL (ref 80–100)
ERYTHROCYTES (10*6/UL) IN BLOOD BY AUTOMATED COUNT: 3.51 X10E12/L (ref 4.5–5.9)
GFR MALE: 54 ML/MIN/1.73M2
GLUCOSE (MG/DL) IN SER/PLAS: 93 MG/DL (ref 74–99)
HEMATOCRIT (%) IN BLOOD BY AUTOMATED COUNT: 32.3 % (ref 41–52)
HEMOGLOBIN (G/DL) IN BLOOD: 9.5 G/DL (ref 13.5–17.5)
IMMATURE GRANULOCYTES/100 LEUKOCYTES IN BLOOD BY AUTOMATED COUNT: 0.4 % (ref 0–0.9)
LEUKOCYTES (10*3/UL) IN BLOOD BY AUTOMATED COUNT: 11.2 X10E9/L (ref 4.4–11.3)
LYMPHOCYTES (10*3/UL) IN BLOOD BY AUTOMATED COUNT: 0.75 X10E9/L (ref 0.8–3)
LYMPHOCYTES/100 LEUKOCYTES IN BLOOD BY AUTOMATED COUNT: 6.7 % (ref 13–44)
MONOCYTES (10*3/UL) IN BLOOD BY AUTOMATED COUNT: 1.18 X10E9/L (ref 0.05–0.8)
MONOCYTES/100 LEUKOCYTES IN BLOOD BY AUTOMATED COUNT: 10.6 % (ref 2–10)
NEUTROPHILS (10*3/UL) IN BLOOD BY AUTOMATED COUNT: 8.88 X10E9/L (ref 1.6–5.5)
NEUTROPHILS/100 LEUKOCYTES IN BLOOD BY AUTOMATED COUNT: 79.6 % (ref 40–80)
PLATELETS (10*3/UL) IN BLOOD AUTOMATED COUNT: 329 X10E9/L (ref 150–450)
POTASSIUM (MMOL/L) IN SER/PLAS: 4.7 MMOL/L (ref 3.5–5.3)
PROTEIN TOTAL: 7 G/DL (ref 6.4–8.2)
SODIUM (MMOL/L) IN SER/PLAS: 137 MMOL/L (ref 136–145)
UREA NITROGEN (MG/DL) IN SER/PLAS: 20 MG/DL (ref 6–23)

## 2023-04-04 ENCOUNTER — OFFICE VISIT (OUTPATIENT)
Dept: PAIN MANAGEMENT | Age: 88
End: 2023-04-04
Payer: MEDICARE

## 2023-04-04 VITALS
DIASTOLIC BLOOD PRESSURE: 68 MMHG | TEMPERATURE: 97.4 F | WEIGHT: 254 LBS | BODY MASS INDEX: 38.49 KG/M2 | HEIGHT: 68 IN | SYSTOLIC BLOOD PRESSURE: 130 MMHG

## 2023-04-04 DIAGNOSIS — M96.1 LUMBAR POST-LAMINECTOMY SYNDROME: Primary | ICD-10-CM

## 2023-04-04 PROCEDURE — 1111F DSCHRG MED/CURRENT MED MERGE: CPT | Performed by: PAIN MEDICINE

## 2023-04-04 PROCEDURE — 1123F ACP DISCUSS/DSCN MKR DOCD: CPT | Performed by: PAIN MEDICINE

## 2023-04-04 PROCEDURE — 1036F TOBACCO NON-USER: CPT | Performed by: PAIN MEDICINE

## 2023-04-04 PROCEDURE — G8417 CALC BMI ABV UP PARAM F/U: HCPCS | Performed by: PAIN MEDICINE

## 2023-04-04 PROCEDURE — 63685 INS/RPLC SPI NPG/RCVR POCKET: CPT | Performed by: PAIN MEDICINE

## 2023-04-04 PROCEDURE — 99213 OFFICE O/P EST LOW 20 MIN: CPT | Performed by: PAIN MEDICINE

## 2023-04-04 PROCEDURE — G8427 DOCREV CUR MEDS BY ELIG CLIN: HCPCS | Performed by: PAIN MEDICINE

## 2023-04-04 PROCEDURE — 63660 PR REVISE/REMOVE NEUROELECTRODE: CPT | Performed by: PAIN MEDICINE

## 2023-04-04 NOTE — PROGRESS NOTES
Negative. Musculoskeletal: Negative. Skin: Negative. Allergic/Immunologic: Negative. Neurological: Negative. Hematological: Negative. Psychiatric/Behavioral: Negative. All other systems reviewed and are negative. Physical Exam     /88   Pulse 88   Temp 97.8 °F (36.6 °C)   Ht 5' 8\" (1.727 m)   Wt 255 lb (115.7 kg)   SpO2 96%   BMI 38.77 kg/m²   Physical Exam  Vitals and nursing note reviewed. Constitutional:       Appearance: Normal appearance. HENT:      Head: Normocephalic. Right Ear: Ear canal normal.      Left Ear: Ear canal normal.      Nose: Nose normal.      Mouth/Throat:      Mouth: Mucous membranes are moist.   Eyes:      Extraocular Movements: Extraocular movements intact. Conjunctiva/sclera: Conjunctivae normal.      Pupils: Pupils are equal, round, and reactive to light. Cardiovascular:      Rate and Rhythm: Normal rate and regular rhythm. Pulses: Normal pulses. Heart sounds: Normal heart sounds. Pulmonary:      Effort: Pulmonary effort is normal.      Breath sounds: Normal breath sounds. Abdominal:      General: Abdomen is flat. Bowel sounds are normal.      Palpations: Abdomen is soft. Musculoskeletal:         General: Normal range of motion. Cervical back: Normal range of motion and neck supple. Comments: Good pulses bilateral, Surgical scar Seen, No lumbar tenderness, Weakness Plantar extension, Pain on SLRs both sides. Skin:     General: Skin is warm. Capillary Refill: Capillary refill takes less than 2 seconds. Neurological:      General: No focal deficit present. Mental Status: He is alert. Mental status is at baseline. He is oriented. Psychiatric:         Mood and Affect: Mood normal.         Behavior: Behavior normal.         Thought Content: Thought content normal.         Judgment: Judgment normal.         Plan   S/p Stim TRIAL. With 70% Relief with increased activity over all.  Lead d/aj intact

## 2023-05-02 ENCOUNTER — HOSPITAL ENCOUNTER (OUTPATIENT)
Dept: PREADMISSION TESTING | Age: 88
Discharge: HOME | End: 2023-05-06
Payer: MEDICARE

## 2023-05-02 VITALS
OXYGEN SATURATION: 96 % | HEART RATE: 73 BPM | TEMPERATURE: 98 F | HEIGHT: 67 IN | RESPIRATION RATE: 18 BRPM | DIASTOLIC BLOOD PRESSURE: 75 MMHG | BODY MASS INDEX: 39.8 KG/M2 | WEIGHT: 253.6 LBS | SYSTOLIC BLOOD PRESSURE: 133 MMHG

## 2023-05-02 LAB
ALBUMIN SERPL-MCNC: 3.5 G/DL (ref 3.5–4.6)
ALP SERPL-CCNC: 65 U/L (ref 35–104)
ALT SERPL-CCNC: 23 U/L (ref 0–41)
ANION GAP SERPL CALCULATED.3IONS-SCNC: 11 MEQ/L (ref 9–15)
APTT PPP: 46.4 SEC (ref 24.4–36.8)
AST SERPL-CCNC: 15 U/L (ref 0–40)
BASOPHILS # BLD: 0 K/UL (ref 0–0.2)
BASOPHILS NFR BLD: 0.6 %
BILIRUB SERPL-MCNC: <0.2 MG/DL (ref 0.2–0.7)
BUN SERPL-MCNC: 30 MG/DL (ref 8–23)
BURR CELLS: ABNORMAL
CALCIUM SERPL-MCNC: 9.3 MG/DL (ref 8.5–9.9)
CHLORIDE SERPL-SCNC: 101 MEQ/L (ref 95–107)
CO2 SERPL-SCNC: 24 MEQ/L (ref 20–31)
CREAT SERPL-MCNC: 1.07 MG/DL (ref 0.7–1.2)
EOSINOPHIL # BLD: 0 K/UL (ref 0–0.7)
EOSINOPHIL NFR BLD: 0.1 %
ERYTHROCYTE [DISTWIDTH] IN BLOOD BY AUTOMATED COUNT: 21.1 % (ref 11.5–14.5)
GLOBULIN SER CALC-MCNC: 2.9 G/DL (ref 2.3–3.5)
GLUCOSE SERPL-MCNC: 110 MG/DL (ref 70–99)
HCT VFR BLD AUTO: 38 % (ref 42–52)
HGB BLD-MCNC: 12 G/DL (ref 14–18)
INR PPP: 3.5
LYMPHOCYTES # BLD: 0.9 K/UL (ref 1–4.8)
LYMPHOCYTES NFR BLD: 6 %
MCH RBC QN AUTO: 26.2 PG (ref 27–31.3)
MCHC RBC AUTO-ENTMCNC: 31.6 % (ref 33–37)
MCV RBC AUTO: 82.9 FL (ref 79–92.2)
MONOCYTES # BLD: 0.7 K/UL (ref 0.2–0.8)
MONOCYTES NFR BLD: 4.5 %
NEUTROPHILS # BLD: 11.8 K/UL (ref 1.4–6.5)
NEUTS SEG NFR BLD: 89 %
NRBC BLD-RTO: 2 /100 WBC
OVALOCYTES BLD QL SMEAR: ABNORMAL
PLATELET # BLD AUTO: 277 K/UL (ref 130–400)
PLATELET BLD QL SMEAR: NORMAL
POIKILOCYTOSIS BLD QL SMEAR: ABNORMAL
POLYCHROMASIA BLD QL SMEAR: ABNORMAL
POTASSIUM SERPL-SCNC: 4.6 MEQ/L (ref 3.4–4.9)
PROT SERPL-MCNC: 6.4 G/DL (ref 6.3–8)
PROTHROMBIN TIME: 35.4 SEC (ref 12.3–14.9)
RBC # BLD AUTO: 4.59 M/UL (ref 4.7–6.1)
SODIUM SERPL-SCNC: 136 MEQ/L (ref 135–144)
VARIANT LYMPHS NFR BLD: 1 %
WBC # BLD AUTO: 13.3 K/UL (ref 4.8–10.8)

## 2023-05-02 PROCEDURE — 80053 COMPREHEN METABOLIC PANEL: CPT

## 2023-05-02 PROCEDURE — 85730 THROMBOPLASTIN TIME PARTIAL: CPT

## 2023-05-02 PROCEDURE — 85610 PROTHROMBIN TIME: CPT

## 2023-05-02 PROCEDURE — 85025 COMPLETE CBC W/AUTO DIFF WBC: CPT

## 2023-05-02 RX ORDER — PREDNISONE 10 MG/1
10 TABLET ORAL DAILY
COMMUNITY

## 2023-05-02 RX ORDER — ENOXAPARIN SODIUM 300 MG/3ML
INJECTION INTRAVENOUS; SUBCUTANEOUS 2 TIMES DAILY
COMMUNITY

## 2023-05-02 ASSESSMENT — ENCOUNTER SYMPTOMS
WHEEZING: 0
ALLERGIC/IMMUNOLOGIC NEGATIVE: 1
BLOOD IN STOOL: 0
DIARRHEA: 0
RHINORRHEA: 1
NAUSEA: 0
VOMITING: 0
ABDOMINAL DISTENTION: 0
CONSTIPATION: 0
PHOTOPHOBIA: 0
EYE ITCHING: 0
ABDOMINAL PAIN: 0
EYE REDNESS: 0
EYE DISCHARGE: 0
COUGH: 1
SINUS PRESSURE: 0
TROUBLE SWALLOWING: 0
SINUS PAIN: 0
SORE THROAT: 0
EYE PAIN: 0
CHEST TIGHTNESS: 0
SHORTNESS OF BREATH: 0
BACK PAIN: 0

## 2023-05-02 NOTE — PROGRESS NOTES
5/2/23 I told pt and pt's wife that I would be requesting last visit notes with cardiology and last device check. I told pt he might need cardiac or pulm clearance. Pt and wife verbalize understanding. I told pt and wife I would update them at end of day. I called Dr. Clari Cabrera office and spoke with Carmella Arredondo. I reported to Carmella Arredondo pt's PAT apt-persistent cough, leg swelling, \"unsure if they saw cardiology\", antibiotics and steroids that pt reports he is taking, and my request I sent over to cardiology for records. I told Carmella Arredondo pt should have cardiac clearance/ensure cardiology is aware of upcoming surgery and possible pulm clearance? Carmella Arredondo verbalizes understanding and reports she will discuss with Dr. Clari Cabrera. 1720 I called and updated pt/pt wife that I spoke with Dr. Clari Cabrera office. I reported to pt someone from Dr. Clari Cabrera office or PAT will contact pt tomorrow with update. Pt/pt wife verbalizes understanding and does not have any further questions at this time.

## 2023-05-02 NOTE — H&P
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 Plan:  Preoperative workup as follows: PAT, CBC, CMP, PTT, PT/INR, EKG 2/18/23 in epic, echo 2/25/23 in epic  2.    Scheduled for: spinal cord stimulator permanent placement on 5/9/23    EDELMIRA Parham CNP  5/2/2023  5:17 PM

## 2023-05-03 ENCOUNTER — TELEPHONE (OUTPATIENT)
Dept: NEUROSURGERY | Age: 88
End: 2023-05-03

## 2023-05-03 NOTE — TELEPHONE ENCOUNTER
SURGERY SCHEDULED 5/9/2023 - PERMANENT SPINAL CORD STIMULATOR. PATIENT HAD PRE-TESTING YESTERDAY. PLEASE SEE THE 2 NOTES FROM THE NURSE. SHE IS RECOMMENDING CARDIAC AND PULMONARY CLEARANCES. PLEASE REVIEW AND CONFIRM WHAT IS  NECESSARY TO PROCEED WITH SURGERY.

## 2023-05-03 NOTE — TELEPHONE ENCOUNTER
CALLED PT @ 1:10PM AND SPOKE TO HIS WIFE TO LET HER KNOW THAT DR. MCDONALD IS ASKING FOR CARDIAC AND PULMONARY CLEARANCES BEFORE THE PROCEDURE DUE TO THE NURSES NOTE AFTER HIS PRE-TESTING APPT. SHE STATES HE HAS AN APPT WITH DR. Melida Kimball AND DR. Yasmien Moreno COMING UP THIS MONTH.  THEY WILL CALL TO SEE IF HE CAN GET MOVED UP. SHE IS AWARE THE SURGERY WILL NOT TAKE PLACE WITHOUT THOSE CLEARANCES.

## 2023-05-09 ENCOUNTER — HOSPITAL ENCOUNTER (OUTPATIENT)
Dept: GENERAL RADIOLOGY | Age: 88
Discharge: HOME | End: 2023-05-11
Attending: PAIN MEDICINE
Payer: MEDICARE

## 2023-05-09 ENCOUNTER — ANESTHESIA (OUTPATIENT)
Dept: OPERATING ROOM | Age: 88
End: 2023-05-09
Payer: MEDICARE

## 2023-05-09 ENCOUNTER — HOSPITAL ENCOUNTER (OUTPATIENT)
Age: 88
Setting detail: OUTPATIENT SURGERY
Discharge: HOME | End: 2023-05-09
Attending: PAIN MEDICINE | Admitting: PAIN MEDICINE
Payer: MEDICARE

## 2023-05-09 ENCOUNTER — ANESTHESIA EVENT (OUTPATIENT)
Dept: OPERATING ROOM | Age: 88
End: 2023-05-09
Payer: MEDICARE

## 2023-05-09 VITALS
SYSTOLIC BLOOD PRESSURE: 155 MMHG | TEMPERATURE: 97.6 F | RESPIRATION RATE: 20 BRPM | DIASTOLIC BLOOD PRESSURE: 76 MMHG | OXYGEN SATURATION: 95 % | HEART RATE: 67 BPM

## 2023-05-09 DIAGNOSIS — R52 PAIN: ICD-10-CM

## 2023-05-09 LAB
APTT PPP: 21.4 SEC (ref 24.4–36.8)
INR PPP: 1.1
PROTHROMBIN TIME: 14 SEC (ref 12.3–14.9)

## 2023-05-09 PROCEDURE — 85610 PROTHROMBIN TIME: CPT

## 2023-05-09 PROCEDURE — 63650 IMPLANT NEUROELECTRODES: CPT | Performed by: PAIN MEDICINE

## 2023-05-09 PROCEDURE — 3600000014 HC SURGERY LEVEL 4 ADDTL 15MIN: Performed by: PAIN MEDICINE

## 2023-05-09 PROCEDURE — 6360000002 HC RX W HCPCS: Performed by: PAIN MEDICINE

## 2023-05-09 PROCEDURE — 3209999900 FLUORO FOR SURGICAL PROCEDURES

## 2023-05-09 PROCEDURE — 7100000010 HC PHASE II RECOVERY - FIRST 15 MIN: Performed by: PAIN MEDICINE

## 2023-05-09 PROCEDURE — 7100000011 HC PHASE II RECOVERY - ADDTL 15 MIN: Performed by: PAIN MEDICINE

## 2023-05-09 PROCEDURE — A4217 STERILE WATER/SALINE, 500 ML: HCPCS | Performed by: PAIN MEDICINE

## 2023-05-09 PROCEDURE — 2500000003 HC RX 250 WO HCPCS: Performed by: PAIN MEDICINE

## 2023-05-09 PROCEDURE — 2709999900 HC NON-CHARGEABLE SUPPLY: Performed by: PAIN MEDICINE

## 2023-05-09 PROCEDURE — 85730 THROMBOPLASTIN TIME PARTIAL: CPT

## 2023-05-09 PROCEDURE — 2580000003 HC RX 258

## 2023-05-09 PROCEDURE — 3600000004 HC SURGERY LEVEL 4 BASE: Performed by: PAIN MEDICINE

## 2023-05-09 PROCEDURE — 2580000003 HC RX 258: Performed by: PAIN MEDICINE

## 2023-05-09 PROCEDURE — 3700000000 HC ANESTHESIA ATTENDED CARE: Performed by: PAIN MEDICINE

## 2023-05-09 PROCEDURE — 3700000001 HC ADD 15 MINUTES (ANESTHESIA): Performed by: PAIN MEDICINE

## 2023-05-09 RX ORDER — FENTANYL CITRATE 0.05 MG/ML
50 INJECTION, SOLUTION INTRAMUSCULAR; INTRAVENOUS EVERY 10 MIN PRN
Status: CANCELLED | OUTPATIENT
Start: 2023-05-09

## 2023-05-09 RX ORDER — MAGNESIUM HYDROXIDE 1200 MG/15ML
LIQUID ORAL CONTINUOUS PRN
Status: COMPLETED | OUTPATIENT
Start: 2023-05-09 | End: 2023-05-09

## 2023-05-09 RX ORDER — SODIUM CHLORIDE 0.9 % (FLUSH) 0.9 %
5-40 SYRINGE (ML) INJECTION EVERY 12 HOURS SCHEDULED
Status: DISCONTINUED | OUTPATIENT
Start: 2023-05-09 | End: 2023-05-09 | Stop reason: HOSPADM

## 2023-05-09 RX ORDER — SODIUM CHLORIDE 0.9 % (FLUSH) 0.9 %
5-40 SYRINGE (ML) INJECTION PRN
Status: DISCONTINUED | OUTPATIENT
Start: 2023-05-09 | End: 2023-05-09 | Stop reason: HOSPADM

## 2023-05-09 RX ORDER — METOCLOPRAMIDE HYDROCHLORIDE 5 MG/ML
10 INJECTION INTRAMUSCULAR; INTRAVENOUS
Status: CANCELLED | OUTPATIENT
Start: 2023-05-09 | End: 2023-05-10

## 2023-05-09 RX ORDER — ONDANSETRON 2 MG/ML
4 INJECTION INTRAMUSCULAR; INTRAVENOUS
Status: CANCELLED | OUTPATIENT
Start: 2023-05-09 | End: 2023-05-10

## 2023-05-09 RX ORDER — OXYCODONE HYDROCHLORIDE 5 MG/1
5 TABLET ORAL
Status: CANCELLED | OUTPATIENT
Start: 2023-05-09 | End: 2023-05-10

## 2023-05-09 RX ORDER — MEPERIDINE HYDROCHLORIDE 25 MG/ML
12.5 INJECTION INTRAMUSCULAR; INTRAVENOUS; SUBCUTANEOUS
Status: CANCELLED | OUTPATIENT
Start: 2023-05-09 | End: 2023-05-10

## 2023-05-09 RX ORDER — SODIUM CHLORIDE 0.9 % (FLUSH) 0.9 %
5-40 SYRINGE (ML) INJECTION EVERY 12 HOURS SCHEDULED
Status: CANCELLED | OUTPATIENT
Start: 2023-05-09

## 2023-05-09 RX ORDER — SODIUM CHLORIDE 9 MG/ML
INJECTION, SOLUTION INTRAVENOUS PRN
Status: DISCONTINUED | OUTPATIENT
Start: 2023-05-09 | End: 2023-05-09 | Stop reason: HOSPADM

## 2023-05-09 RX ORDER — WATER 1000 ML/1000ML
INJECTION, SOLUTION INTRAVENOUS
Status: COMPLETED
Start: 2023-05-09 | End: 2023-05-09

## 2023-05-09 RX ORDER — SODIUM CHLORIDE 0.9 % (FLUSH) 0.9 %
5-40 SYRINGE (ML) INJECTION PRN
Status: CANCELLED | OUTPATIENT
Start: 2023-05-09

## 2023-05-09 RX ORDER — SODIUM CHLORIDE 9 MG/ML
25 INJECTION, SOLUTION INTRAVENOUS PRN
Status: CANCELLED | OUTPATIENT
Start: 2023-05-09

## 2023-05-09 RX ORDER — LIDOCAINE HYDROCHLORIDE 10 MG/ML
1 INJECTION, SOLUTION EPIDURAL; INFILTRATION; INTRACAUDAL; PERINEURAL
Status: DISCONTINUED | OUTPATIENT
Start: 2023-05-09 | End: 2023-05-09 | Stop reason: HOSPADM

## 2023-05-09 RX ORDER — LIDOCAINE HYDROCHLORIDE 10 MG/ML
INJECTION, SOLUTION EPIDURAL; INFILTRATION; INTRACAUDAL; PERINEURAL PRN
Status: DISCONTINUED | OUTPATIENT
Start: 2023-05-09 | End: 2023-05-09 | Stop reason: ALTCHOICE

## 2023-05-09 RX ORDER — ACETAMINOPHEN 500 MG
1000 TABLET ORAL ONCE
Status: DISCONTINUED | OUTPATIENT
Start: 2023-05-09 | End: 2023-05-09 | Stop reason: HOSPADM

## 2023-05-09 RX ORDER — CEFAZOLIN SODIUM IN 0.9 % NACL 2 G/100 ML
2000 PLASTIC BAG, INJECTION (ML) INTRAVENOUS
Status: COMPLETED | OUTPATIENT
Start: 2023-05-09 | End: 2023-05-09

## 2023-05-09 RX ADMIN — SODIUM CHLORIDE: 9 INJECTION, SOLUTION INTRAVENOUS at 09:15

## 2023-05-09 RX ADMIN — HYDROCORTISONE SODIUM SUCCINATE 100 MG: 100 INJECTION, POWDER, FOR SOLUTION INTRAMUSCULAR; INTRAVENOUS at 09:28

## 2023-05-09 RX ADMIN — Medication 2000 MG: at 11:45

## 2023-05-09 RX ADMIN — WATER 2 ML: 1 INJECTION INTRAMUSCULAR; INTRAVENOUS; SUBCUTANEOUS at 09:33

## 2023-05-09 ASSESSMENT — PAIN - FUNCTIONAL ASSESSMENT: PAIN_FUNCTIONAL_ASSESSMENT: NONE - DENIES PAIN

## 2023-05-09 ASSESSMENT — ENCOUNTER SYMPTOMS: SHORTNESS OF BREATH: 1

## 2023-05-09 NOTE — ANESTHESIA PRE PROCEDURE
pressure. Normal right atrium. No systolic collapse, early woodruff collapse which is non specific   Normal mitral valve structure and function. Normal aortic valve structure and function. Normal tricuspid valve structure and function. This is a limited echo as pt had a echo last week and pericardial effusion   has improved upon reviewing the previous echo. Just a remnant of the fluid   is noted but pericardium is mildly thickened and fibrinous      Not much interrogation was performed but pt did appear to have mild to   moderate aortic valve insufficiency--it probably has not worsened since   that exam     Neuro/Psych:   (+) CVA:, TIA,             GI/Hepatic/Renal: Neg GI/Hepatic/Renal ROS            Endo/Other:    (+) blood dyscrasia: anticoagulation therapy:., .          Pt had PAT visit. Abdominal:             Vascular: negative vascular ROS. Other Findings:           Anesthesia Plan      MAC     ASA 3     (  Discussed risk of post operative visual disturbances, facial trauma, facial edema and pressure sores)  Induction: intravenous. MIPS: Postoperative opioids intended. Anesthetic plan and risks discussed with patient. Plan discussed with CRNA.                     [address] [place] [address],    5/9/2023

## 2023-05-09 NOTE — DISCHARGE INSTRUCTIONS
Diet activity and Meds as before, restart Lovenox in Few hrs if no headach, Restart Coumadin in morning with lovenox if no headach, Call my office or hospital if there is a headach., Follow up in my office in few days call and make appointment.

## 2023-05-09 NOTE — OP NOTE
Operative Note      Patient: Candance Brooking  YOB: 1935  MRN: [id number]    Date of Procedure: 5/9/2023    Pre-Op Diagnosis Codes:     * Lumbar post-laminectomy syndrome [M96.1]    Post-Op Diagnosis: Post-Op Diagnosis Codes:     * Lumbar post-laminectomy syndrome [M96.1]       Procedure(s):  ATTEMPTED SPINAL CORD STIMULATOR PERMANENT PLACEMENT    Surgeon(s):  Gilmer Mon MD    Assistant:   Physician Assistant: Sahra Rosas PA-C    Anesthesia: Monitor Anesthesia Care    Estimated Blood Loss (mL): Minimal    Complications: CSF leak    Specimens:   * No specimens in log *    Implants:  * No implants in log *      Drains: * No LDAs found *    Findings: None. Detailed Description of Procedure:   Pt explained of procedure, Risks and complications Discussed questions and concerns addressed, IV inserted per routine protocol. The patient was taken to the operating room and placed in the prone position with a pillow position underneath the abdomen. Ancef IV piggyback 2 g was infused prior to the incision. The lower back area was prepped and draped in a sterile fashion using Chloraprep  as well as Ioban film. Skin was marked for needle placements using  fluoroscopy that was drapped appropriately. The skin and subcutaneous tissue was anesthetized with approximately 10 mL of 1% preservative-free lidocaine using a 25-gauge 1-1/2 inch needle. Then a 14-gauge straight tip Touhy needles were passed through the skin and subcutaneous tissue directed for the interlaminar space at L3-4 from the right. Very carefully the needle was advanced, and then using a loss-of-resistance technique with air the needles were advanced into the epidural space under fluroscopy. I his the CSF and had to abort the procedure, Pt taken to PACU allowed to recover and discharged home in stable condition.     Electronically signed by Gilmer Mon MD on 5/9/2023 at 12:28 PM

## 2023-05-09 NOTE — ANESTHESIA POSTPROCEDURE EVALUATION
Department of Anesthesiology  Postprocedure Note    Patient: Rosangela De Los Santos  MRN: [id number]  YOB: 1935  Date of evaluation: 5/9/2023      Procedure Summary     Date: 05/09/23 Room / Location: [address]    Anesthesia Start: 3010 Anesthesia Stop: 9404    Procedure: SPINAL CORD STIMULATOR PERMANENT PLACEMENT (Back) Diagnosis:       Lumbar post-laminectomy syndrome      (Lumbar post-laminectomy syndrome [M96.1])    Surgeons: Delmy Riley MD Responsible Provider: Murphy Luna DO    Anesthesia Type: MAC ASA Status: 3          Anesthesia Type: No value filed.     Shae Phase I: Shae Score: 10    Shae Phase II:        Anesthesia Post Evaluation    Patient location during evaluation: bedside  Patient participation: complete - patient participated  Level of consciousness: awake and awake and alert  Pain score: 0  Airway patency: patent  Nausea & Vomiting: no nausea and no vomiting  Complications: no  Cardiovascular status: blood pressure returned to baseline and hemodynamically stable  Respiratory status: acceptable  Hydration status: euvolemic

## 2023-05-15 ENCOUNTER — OFFICE VISIT (OUTPATIENT)
Dept: PAIN MANAGEMENT | Age: 88
End: 2023-05-15
Payer: MEDICARE

## 2023-05-15 VITALS — TEMPERATURE: 97.4 F | WEIGHT: 253 LBS | BODY MASS INDEX: 39.71 KG/M2 | HEIGHT: 67 IN

## 2023-05-15 DIAGNOSIS — M79.605 BILATERAL LEG PAIN: ICD-10-CM

## 2023-05-15 DIAGNOSIS — Z48.89 ENCOUNTER FOR POST SURGICAL WOUND CHECK: ICD-10-CM

## 2023-05-15 DIAGNOSIS — D75.1 POLYCYTHEMIA: ICD-10-CM

## 2023-05-15 DIAGNOSIS — M96.1 LUMBAR POST-LAMINECTOMY SYNDROME: Primary | ICD-10-CM

## 2023-05-15 DIAGNOSIS — M79.604 BILATERAL LEG PAIN: ICD-10-CM

## 2023-05-15 DIAGNOSIS — M48.062 LUMBAR STENOSIS WITH NEUROGENIC CLAUDICATION: ICD-10-CM

## 2023-05-15 PROCEDURE — 1036F TOBACCO NON-USER: CPT | Performed by: PAIN MEDICINE

## 2023-05-15 PROCEDURE — 1123F ACP DISCUSS/DSCN MKR DOCD: CPT | Performed by: PAIN MEDICINE

## 2023-05-15 PROCEDURE — G8417 CALC BMI ABV UP PARAM F/U: HCPCS | Performed by: PAIN MEDICINE

## 2023-05-15 PROCEDURE — G8427 DOCREV CUR MEDS BY ELIG CLIN: HCPCS | Performed by: PAIN MEDICINE

## 2023-05-15 PROCEDURE — 99213 OFFICE O/P EST LOW 20 MIN: CPT | Performed by: PAIN MEDICINE

## 2023-05-15 NOTE — PROGRESS NOTES
History of Present Illness     Patient Identification  Laron Pollack is a 82 y.o. male. Patient information was obtained from patient. Chief Complaint   Chief Complaint   Patient presents with    Back Pain    Leg Pain     Bilateral        Patient presents with complaint of bilateral calf follow up attempts at Permanent stim placement second time with wet taps. No head achs, Restarted Coumadin. This is a result of no known injury. Vascular studies WNL,Onset of pain was 1 year ago after a laminotomy and has been stable since. described as Throbbing and rated as moderate, to both feet. Symptoms include weakness both legs. . The patient denies incontinence, dysuria. The patient denies other injuries. Stopped Lyrica which made him Goffy Had an EMG that showed Neuropathy, following with Vascular surgeon was told no circulation issues, Continues to have pain both calves.     Past Medical History:   Diagnosis Date    A-fib ([facility]     approx 1 year ago-cardioverted    Arthritis     Baker's cyst of knee, left     Cerebral artery occlusion with cerebral infarction ([facility]     TIA sx felt funny --     Congestive heart failure ([facility] 2022    Coronary artery disease     HTN (hypertension)     meds > 20 yrs    Hx of blood clots     DVT left leg     Hyperlipidemia     meds > 20 yrs    Pacemaker 2022    Polycythemia     Torn meniscus     left knee     Family History   Problem Relation Age of Onset    Heart Attack Mother     Other Mother          in MVA    Colon Cancer Other     Cancer Daughter         colon & lung cancer    Colon Cancer Daughter     Breast Cancer Other     Other Father          at age 80    Other Sister          as infant    Other Brother         unknown medical hx    No Known Problems Son     Other Brother          at age 61 due to accident     Current Outpatient Medications   Medication Sig Dispense Refill    losartan (COZAAR) 100 MG tablet Take 100 mg by mouth in the

## 2023-05-23 ENCOUNTER — TELEPHONE (OUTPATIENT)
Dept: PAIN MANAGEMENT | Age: 88
End: 2023-05-23

## 2023-05-23 NOTE — TELEPHONE ENCOUNTER
Patient asks if Dr. Beatris Benedict can please call him? Pt states he was awaiting to hear from Dr. Beatris Benedict for a recommendation on who to see since last surgery did not go well.

## 2023-06-06 ENCOUNTER — TELEPHONE (OUTPATIENT)
Dept: NEUROSURGERY | Age: 88
End: 2023-06-06

## 2023-06-06 NOTE — TELEPHONE ENCOUNTER
PT CALLED @ 10:35AM STATING HE HAS AN APPT ON 7/5/2023 WITH THE DR. THAT DR. MCDONALD REFERRED HIM TOO. HE WOULD LIKE TO TAKE HIS RECORDS WITH HIM SO HE DOESN'T HAVE TO START OVER FROM THE BEGINNING. PT IS GONG TO COME IN AND COMPLETE A RECORDS RELEASE TO GET THE RECORDS.
[place]lette

## 2023-09-16 PROBLEM — Z85.831: Status: ACTIVE | Noted: 2023-09-16

## 2023-09-16 PROBLEM — M19.90 ARTHRITIS: Status: ACTIVE | Noted: 2023-09-16

## 2023-09-16 PROBLEM — I48.91 ATRIAL FIBRILLATION (MULTI): Status: ACTIVE | Noted: 2023-09-16

## 2023-09-16 PROBLEM — C45.9: Status: ACTIVE | Noted: 2023-09-16

## 2023-09-16 PROBLEM — M79.605 PAIN AND SWELLING OF LEFT LOWER EXTREMITY: Status: ACTIVE | Noted: 2023-09-16

## 2023-09-16 PROBLEM — I34.0 MITRAL REGURGITATION: Status: ACTIVE | Noted: 2023-09-16

## 2023-09-16 PROBLEM — M17.9 KNEE OSTEOARTHRITIS: Status: ACTIVE | Noted: 2023-09-16

## 2023-09-16 PROBLEM — R07.9 CHEST PAIN: Status: ACTIVE | Noted: 2023-09-16

## 2023-09-16 PROBLEM — E78.2 MIXED HYPERLIPIDEMIA: Status: ACTIVE | Noted: 2023-09-16

## 2023-09-16 PROBLEM — I70.213 ATHEROSCLEROSIS OF NATIVE ARTERY OF BOTH LOWER EXTREMITIES WITH INTERMITTENT CLAUDICATION (CMS-HCC): Status: ACTIVE | Noted: 2023-09-16

## 2023-09-16 PROBLEM — I49.5 SINUS NODE DYSFUNCTION (MULTI): Status: ACTIVE | Noted: 2023-09-16

## 2023-09-16 PROBLEM — R00.1 BRADYCARDIA: Status: ACTIVE | Noted: 2023-09-16

## 2023-09-16 PROBLEM — I83.892 SYMPTOMATIC VARICOSE VEINS OF LEFT LOWER EXTREMITY: Status: ACTIVE | Noted: 2023-09-16

## 2023-09-16 PROBLEM — I48.92 ATRIAL FLUTTER (MULTI): Status: ACTIVE | Noted: 2023-09-16

## 2023-09-16 PROBLEM — I73.9 INTERMITTENT CLAUDICATION (CMS-HCC): Status: ACTIVE | Noted: 2023-09-16

## 2023-09-16 PROBLEM — I25.10 CAD (CORONARY ATHEROSCLEROTIC DISEASE): Status: ACTIVE | Noted: 2023-09-16

## 2023-09-16 PROBLEM — I10 BENIGN ESSENTIAL HYPERTENSION: Status: ACTIVE | Noted: 2023-09-16

## 2023-09-16 PROBLEM — S82.61XA FRACTURE OF LATERAL MALLEOLUS OF RIGHT ANKLE: Status: ACTIVE | Noted: 2023-09-16

## 2023-09-16 PROBLEM — M25.562 CHRONIC PAIN OF LEFT KNEE: Status: ACTIVE | Noted: 2023-09-16

## 2023-09-16 PROBLEM — M79.89 PAIN AND SWELLING OF LEFT LOWER EXTREMITY: Status: ACTIVE | Noted: 2023-09-16

## 2023-09-16 PROBLEM — Z86.73 HX-TIA (TRANSIENT ISCHEMIC ATTACK): Status: ACTIVE | Noted: 2023-09-16

## 2023-09-16 PROBLEM — M17.10 PATELLOFEMORAL ARTHRITIS: Status: ACTIVE | Noted: 2023-09-16

## 2023-09-16 PROBLEM — M25.561 KNEE PAIN, RIGHT: Status: ACTIVE | Noted: 2023-09-16

## 2023-09-16 PROBLEM — G47.19 EXCESSIVE DAYTIME SLEEPINESS: Status: ACTIVE | Noted: 2023-09-16

## 2023-09-16 PROBLEM — D45 POLYCYTHEMIA VERA (MULTI): Status: ACTIVE | Noted: 2023-09-16

## 2023-09-16 PROBLEM — G47.30 SLEEP APNEA: Status: ACTIVE | Noted: 2023-09-16

## 2023-09-16 PROBLEM — E66.812 CLASS 2 OBESITY WITH BODY MASS INDEX (BMI) OF 38.0 TO 38.9 IN ADULT: Status: ACTIVE | Noted: 2023-09-16

## 2023-09-16 PROBLEM — Z79.01 LONG TERM (CURRENT) USE OF ANTICOAGULANTS: Status: ACTIVE | Noted: 2023-09-16

## 2023-09-16 PROBLEM — J93.9 PNEUMOTHORAX: Status: ACTIVE | Noted: 2023-09-16

## 2023-09-16 PROBLEM — R06.89 DIFFICULTY BREATHING: Status: ACTIVE | Noted: 2023-09-16

## 2023-09-16 PROBLEM — E66.9 CLASS 2 OBESITY WITH BODY MASS INDEX (BMI) OF 38.0 TO 38.9 IN ADULT: Status: ACTIVE | Noted: 2023-09-16

## 2023-09-16 PROBLEM — I87.2 VENOUS INSUFFICIENCY: Status: ACTIVE | Noted: 2023-09-16

## 2023-09-16 PROBLEM — G89.29 CHRONIC PAIN OF LEFT KNEE: Status: ACTIVE | Noted: 2023-09-16

## 2023-09-16 PROBLEM — Z95.0 PACEMAKER: Status: ACTIVE | Noted: 2023-09-16

## 2023-09-16 RX ORDER — POTASSIUM CHLORIDE 20 MEQ/1
0.5 TABLET, EXTENDED RELEASE ORAL DAILY
COMMUNITY
Start: 2022-06-21 | End: 2024-02-16 | Stop reason: SDUPTHER

## 2023-09-16 RX ORDER — ASPIRIN 81 MG/1
81 TABLET ORAL NIGHTLY
COMMUNITY
End: 2023-10-30 | Stop reason: WASHOUT

## 2023-09-16 RX ORDER — METOPROLOL TARTRATE 50 MG/1
0.5 TABLET ORAL DAILY
COMMUNITY
End: 2023-10-30 | Stop reason: SDUPTHER

## 2023-09-16 RX ORDER — VIT C/E/ZN/COPPR/LUTEIN/ZEAXAN 250MG-90MG
1 CAPSULE ORAL 2 TIMES DAILY
Status: ON HOLD | COMMUNITY

## 2023-09-16 RX ORDER — ACETAMINOPHEN 500 MG
5 TABLET ORAL NIGHTLY PRN
Status: ON HOLD | COMMUNITY

## 2023-09-16 RX ORDER — ASPIRIN 325 MG
50000 TABLET, DELAYED RELEASE (ENTERIC COATED) ORAL
COMMUNITY
End: 2023-10-31 | Stop reason: ALTCHOICE

## 2023-09-16 RX ORDER — NITROGLYCERIN 0.4 MG/1
TABLET SUBLINGUAL EVERY 5 MIN PRN
Status: ON HOLD | COMMUNITY

## 2023-09-16 RX ORDER — ACETAMINOPHEN 325 MG/1
2 TABLET ORAL 2 TIMES DAILY
COMMUNITY
End: 2023-10-30 | Stop reason: WASHOUT

## 2023-09-16 RX ORDER — DOXEPIN 3 MG/1
1 TABLET, FILM COATED ORAL NIGHTLY PRN
COMMUNITY
Start: 2022-07-01 | End: 2023-10-31 | Stop reason: ALTCHOICE

## 2023-09-16 RX ORDER — FERROUS SULFATE 325(65) MG
65 TABLET, DELAYED RELEASE (ENTERIC COATED) ORAL
Status: ON HOLD | COMMUNITY

## 2023-09-16 RX ORDER — METOPROLOL TARTRATE 25 MG/1
25 TABLET, FILM COATED ORAL 2 TIMES DAILY
Status: ON HOLD | COMMUNITY

## 2023-09-16 RX ORDER — MELOXICAM 15 MG/1
1 TABLET ORAL DAILY PRN
COMMUNITY
Start: 2022-03-28 | End: 2023-10-31 | Stop reason: DRUGHIGH

## 2023-09-16 RX ORDER — ENOXAPARIN SODIUM 100 MG/ML
1 INJECTION SUBCUTANEOUS 2 TIMES DAILY
COMMUNITY
Start: 2023-02-14 | End: 2023-10-30 | Stop reason: WASHOUT

## 2023-09-16 RX ORDER — CARBOXYMETHYLCELLULOSE SODIUM 10 MG/ML
1 GEL OPHTHALMIC EVERY MORNING
Status: ON HOLD | COMMUNITY

## 2023-09-16 RX ORDER — ROSUVASTATIN CALCIUM 10 MG/1
1 TABLET, COATED ORAL DAILY
Status: ON HOLD | COMMUNITY
Start: 2021-11-23

## 2023-09-16 RX ORDER — LOSARTAN POTASSIUM 100 MG/1
1 TABLET ORAL DAILY
COMMUNITY
Start: 2022-04-18 | End: 2023-10-30 | Stop reason: WASHOUT

## 2023-09-16 RX ORDER — ERGOCALCIFEROL 1.25 MG/1
1.25 CAPSULE ORAL
Status: ON HOLD | COMMUNITY
End: 2024-05-17 | Stop reason: ALTCHOICE

## 2023-09-16 RX ORDER — LANOLIN ALCOHOL/MO/W.PET/CERES
1 CREAM (GRAM) TOPICAL DAILY
COMMUNITY
End: 2023-12-06 | Stop reason: WASHOUT

## 2023-09-16 RX ORDER — WARFARIN 1 MG/1
1-2 TABLET ORAL
COMMUNITY
End: 2024-02-15 | Stop reason: SDUPTHER

## 2023-09-16 RX ORDER — FUROSEMIDE 20 MG/1
1 TABLET ORAL DAILY
Status: ON HOLD | COMMUNITY

## 2023-09-16 RX ORDER — WARFARIN 4 MG/1
TABLET ORAL
COMMUNITY
End: 2024-02-16 | Stop reason: SDUPTHER

## 2023-09-16 RX ORDER — GABAPENTIN 400 MG/1
1 CAPSULE ORAL NIGHTLY
COMMUNITY
Start: 2022-06-21 | End: 2023-10-30 | Stop reason: WASHOUT

## 2023-10-03 LAB
POC INR: 2.6
POC PROTHROMBIN TIME: NORMAL

## 2023-10-04 ENCOUNTER — ANTICOAGULATION - WARFARIN VISIT (OUTPATIENT)
Dept: CARDIOLOGY | Facility: CLINIC | Age: 88
End: 2023-10-04
Payer: MEDICARE

## 2023-10-04 DIAGNOSIS — I48.0 PAROXYSMAL ATRIAL FIBRILLATION (MULTI): ICD-10-CM

## 2023-10-04 PROCEDURE — 85610 PROTHROMBIN TIME: CPT | Performed by: INTERNAL MEDICINE

## 2023-10-10 ENCOUNTER — ANTICOAGULATION - WARFARIN VISIT (OUTPATIENT)
Dept: CARDIOLOGY | Facility: CLINIC | Age: 88
End: 2023-10-10
Payer: MEDICARE

## 2023-10-10 LAB
INR IN PPP BY COAGULATION ASSAY EXTERNAL: 2.5
PROTHROMBIN TIME (PT) IN PPP BY COAGULATION ASSAY EXTERNAL: NORMAL SECONDS

## 2023-10-11 ENCOUNTER — ANCILLARY PROCEDURE (OUTPATIENT)
Dept: RADIOLOGY | Facility: CLINIC | Age: 88
End: 2023-10-11
Payer: MEDICARE

## 2023-10-11 ENCOUNTER — OFFICE VISIT (OUTPATIENT)
Dept: ORTHOPEDIC SURGERY | Facility: CLINIC | Age: 88
End: 2023-10-11
Payer: MEDICARE

## 2023-10-11 DIAGNOSIS — M79.641 HAND PAIN, RIGHT: Primary | ICD-10-CM

## 2023-10-11 DIAGNOSIS — M79.641 HAND PAIN, RIGHT: ICD-10-CM

## 2023-10-11 PROBLEM — M65.341 TRIGGER FINGER, RIGHT RING FINGER: Status: ACTIVE | Noted: 2023-10-11

## 2023-10-11 PROCEDURE — 99213 OFFICE O/P EST LOW 20 MIN: CPT | Mod: PO | Performed by: ORTHOPAEDIC SURGERY

## 2023-10-11 PROCEDURE — 1159F MED LIST DOCD IN RCRD: CPT | Performed by: ORTHOPAEDIC SURGERY

## 2023-10-11 PROCEDURE — 1036F TOBACCO NON-USER: CPT | Performed by: ORTHOPAEDIC SURGERY

## 2023-10-11 PROCEDURE — 99203 OFFICE O/P NEW LOW 30 MIN: CPT | Performed by: ORTHOPAEDIC SURGERY

## 2023-10-11 PROCEDURE — 73130 X-RAY EXAM OF HAND: CPT | Mod: RT,FY

## 2023-10-11 PROCEDURE — 73130 X-RAY EXAM OF HAND: CPT | Mod: RIGHT SIDE | Performed by: ORTHOPAEDIC SURGERY

## 2023-10-11 NOTE — H&P (VIEW-ONLY)
History: Vinicius is here for his right hand.  He has developed pain and catching in the right fourth finger.  It bothers him especially at night and in the morning.  He is right-hand dominant.    Past medical history: Multiple  Medications: Multiple  Allergies: No known drug allergies    Please refer to the intake H&P regarding the patient's review of systems, family history and social history as was done today    HEENT: Normal  Lungs: Clear to auscultation  Heart: RRR  Abdomen: Soft, nontender  Skin: clear  Extremity: He has tenderness in the right fourth finger at the A1 pulley.  There is obvious triggering on today's exam.  No pain in the remaining digits.  No numbness or tingling.  Skin is clear.  Contralateral exam is normal for strength, motion, stability and neurovascular assessment.    Radiographs: X-rays are essentially negative with mild degenerative change only.    Assessment: Right fourth trigger finger    Plan: He has an obvious trigger finger.  We discussed options for injections and trigger finger release and he would rather proceed with surgery to fix this more permanently.  Risk and benefits were noted.  He will need to be off his Coumadin for a few days prior to surgery.  We can see him back preoperatively.    All questions were answered today with the patient.    This note was generated with voice recognition software and may contain grammatical errors.

## 2023-10-12 ENCOUNTER — LAB (OUTPATIENT)
Dept: LAB | Facility: LAB | Age: 88
End: 2023-10-12
Payer: MEDICARE

## 2023-10-12 DIAGNOSIS — M65.341 TRIGGER FINGER, RIGHT RING FINGER: ICD-10-CM

## 2023-10-12 LAB
ALBUMIN SERPL BCP-MCNC: 3.9 G/DL (ref 3.4–5)
ALP SERPL-CCNC: 61 U/L (ref 33–136)
ALT SERPL W P-5'-P-CCNC: 11 U/L (ref 10–52)
ANION GAP SERPL CALC-SCNC: 12 MMOL/L (ref 10–20)
APPEARANCE UR: CLEAR
AST SERPL W P-5'-P-CCNC: 16 U/L (ref 9–39)
BASOPHILS # BLD AUTO: 0.03 X10*3/UL (ref 0–0.1)
BASOPHILS NFR BLD AUTO: 0.4 %
BILIRUB SERPL-MCNC: 0.3 MG/DL (ref 0–1.2)
BILIRUB UR STRIP.AUTO-MCNC: NEGATIVE MG/DL
BUN SERPL-MCNC: 22 MG/DL (ref 6–23)
CALCIUM SERPL-MCNC: 9.4 MG/DL (ref 8.6–10.3)
CHLORIDE SERPL-SCNC: 105 MMOL/L (ref 98–107)
CO2 SERPL-SCNC: 28 MMOL/L (ref 21–32)
COLOR UR: YELLOW
CREAT SERPL-MCNC: 1.26 MG/DL (ref 0.5–1.3)
EOSINOPHIL # BLD AUTO: 0.29 X10*3/UL (ref 0–0.4)
EOSINOPHIL NFR BLD AUTO: 4 %
ERYTHROCYTE [DISTWIDTH] IN BLOOD BY AUTOMATED COUNT: 17.2 % (ref 11.5–14.5)
GFR SERPL CREATININE-BSD FRML MDRD: 56 ML/MIN/1.73M*2
GLUCOSE SERPL-MCNC: 103 MG/DL (ref 74–99)
GLUCOSE UR STRIP.AUTO-MCNC: NEGATIVE MG/DL
HCT VFR BLD AUTO: 42.2 % (ref 41–52)
HGB BLD-MCNC: 13.2 G/DL (ref 13.5–17.5)
IMM GRANULOCYTES # BLD AUTO: 0.03 X10*3/UL (ref 0–0.5)
IMM GRANULOCYTES NFR BLD AUTO: 0.4 % (ref 0–0.9)
INR PPP: 3.1 (ref 0.9–1.1)
KETONES UR STRIP.AUTO-MCNC: NEGATIVE MG/DL
LEUKOCYTE ESTERASE UR QL STRIP.AUTO: NEGATIVE
LYMPHOCYTES # BLD AUTO: 0.79 X10*3/UL (ref 0.8–3)
LYMPHOCYTES NFR BLD AUTO: 10.8 %
MCH RBC QN AUTO: 28.8 PG (ref 26–34)
MCHC RBC AUTO-ENTMCNC: 31.3 G/DL (ref 32–36)
MCV RBC AUTO: 92 FL (ref 80–100)
MONOCYTES # BLD AUTO: 0.89 X10*3/UL (ref 0.05–0.8)
MONOCYTES NFR BLD AUTO: 12.1 %
NEUTROPHILS # BLD AUTO: 5.3 X10*3/UL (ref 1.6–5.5)
NEUTROPHILS NFR BLD AUTO: 72.3 %
NITRITE UR QL STRIP.AUTO: NEGATIVE
NRBC BLD-RTO: 0 /100 WBCS (ref 0–0)
PH UR STRIP.AUTO: 5 [PH]
PLATELET # BLD AUTO: 250 X10*3/UL (ref 150–450)
PMV BLD AUTO: 9.7 FL (ref 7.5–11.5)
POTASSIUM SERPL-SCNC: 4.5 MMOL/L (ref 3.5–5.3)
PROT SERPL-MCNC: 6.5 G/DL (ref 6.4–8.2)
PROT UR STRIP.AUTO-MCNC: NEGATIVE MG/DL
PROTHROMBIN TIME: 35.4 SECONDS (ref 9.8–12.8)
RBC # BLD AUTO: 4.58 X10*6/UL (ref 4.5–5.9)
RBC # UR STRIP.AUTO: NEGATIVE /UL
SODIUM SERPL-SCNC: 140 MMOL/L (ref 136–145)
SP GR UR STRIP.AUTO: 1.01
UROBILINOGEN UR STRIP.AUTO-MCNC: <2 MG/DL
WBC # BLD AUTO: 7.3 X10*3/UL (ref 4.4–11.3)

## 2023-10-12 PROCEDURE — 85610 PROTHROMBIN TIME: CPT

## 2023-10-12 PROCEDURE — 36415 COLL VENOUS BLD VENIPUNCTURE: CPT

## 2023-10-12 PROCEDURE — 81003 URINALYSIS AUTO W/O SCOPE: CPT

## 2023-10-12 PROCEDURE — 85025 COMPLETE CBC W/AUTO DIFF WBC: CPT

## 2023-10-12 PROCEDURE — 80053 COMPREHEN METABOLIC PANEL: CPT

## 2023-10-17 ENCOUNTER — ANTICOAGULATION - WARFARIN VISIT (OUTPATIENT)
Dept: CARDIOLOGY | Facility: CLINIC | Age: 88
End: 2023-10-17
Payer: MEDICARE

## 2023-10-17 LAB
INR IN PPP BY COAGULATION ASSAY EXTERNAL: 2.6
PROTHROMBIN TIME (PT) IN PPP BY COAGULATION ASSAY EXTERNAL: NORMAL SECONDS

## 2023-10-18 ENCOUNTER — APPOINTMENT (OUTPATIENT)
Dept: CARDIOLOGY | Facility: CLINIC | Age: 88
End: 2023-10-18
Payer: MEDICARE

## 2023-10-18 ENCOUNTER — APPOINTMENT (OUTPATIENT)
Dept: CARDIOLOGY | Facility: HOSPITAL | Age: 88
End: 2023-10-18
Payer: MEDICARE

## 2023-10-24 ENCOUNTER — ANTICOAGULATION - WARFARIN VISIT (OUTPATIENT)
Dept: CARDIOLOGY | Facility: CLINIC | Age: 88
End: 2023-10-24
Payer: MEDICARE

## 2023-10-24 LAB
INR IN PPP BY COAGULATION ASSAY EXTERNAL: 2.1
PROTHROMBIN TIME (PT) IN PPP BY COAGULATION ASSAY EXTERNAL: NORMAL SECONDS

## 2023-10-26 ENCOUNTER — TELEPHONE (OUTPATIENT)
Dept: CARDIOLOGY | Facility: CLINIC | Age: 88
End: 2023-10-26
Payer: MEDICARE

## 2023-10-26 NOTE — TELEPHONE ENCOUNTER
Pt's wife Kirsten called office stating that pt is scheduled for trigger finger on Thursday 11/2/2023.  They need to know if pt is cleared and directions for warfarin/lovenox.    Per wife Dr. LIZ Mathis is doing the surgery and his office faxed letter to [facility].      Please review with Dr. Henderson.      Please follow up with pt.

## 2023-10-27 NOTE — TELEPHONE ENCOUNTER
Pt left message asking for return call in regards to holding his blood thinners prior to surgery on Thursday 11/2/2023.

## 2023-10-30 ENCOUNTER — TELEPHONE (OUTPATIENT)
Dept: PRIMARY CARE | Facility: CLINIC | Age: 88
End: 2023-10-30
Payer: MEDICARE

## 2023-10-30 NOTE — TELEPHONE ENCOUNTER
Leah from Dr. LIZ Mathis office left message stating that they faxed clearance form on 10/11/2023.  Pt is scheduled for surgery on Thursday 11/2/2023.  They need to know if pt is cleared for surgery and also need instructions on holding coumadin prior to surgery.   Leah states that clearance can be scanned in to [facility] or faxed to her at [phone].  If scanning in [facility] please let her know it has been scanned in.  Leah can be reached at [phone].      Please follow up.

## 2023-10-30 NOTE — TELEPHONE ENCOUNTER
Patient states he and Dr Perez have left several messages in [place] regarding a procedure on Thursday.      He needs clarification on instructions for his warfarin.    Please advise    [phone]

## 2023-10-30 NOTE — TELEPHONE ENCOUNTER
Received clearance form for patient pending Trigger Finger Release. 11/2/23 with Dr. LIZ Mathis MD     This chart has not been back filled as of yet- I completed med list and allergy list at this time.     On warfarin therapy- Afib  Does he need Lovenox Bridging? Surgery is this Thursday     Printed and placed for Dr. Tina Henderson MD [facility] review.

## 2023-10-31 RX ORDER — TRAZODONE HYDROCHLORIDE 50 MG/1
100 TABLET ORAL NIGHTLY
Status: ON HOLD | COMMUNITY

## 2023-10-31 NOTE — TELEPHONE ENCOUNTER
Per Dr. Tina Henderson MD [facility] patient cleared for procedure. OK to hold Warfarin as requested. No Lovenox bridge needed.   Form scanned into epic by Tina Summers RN at this time.   Closing this note.

## 2023-11-01 ENCOUNTER — ANTICOAGULATION - WARFARIN VISIT (OUTPATIENT)
Dept: CARDIOLOGY | Facility: CLINIC | Age: 88
End: 2023-11-01
Payer: MEDICARE

## 2023-11-01 DIAGNOSIS — G89.18 POST-OPERATIVE PAIN: Primary | ICD-10-CM

## 2023-11-01 LAB
INR IN PPP BY COAGULATION ASSAY EXTERNAL: 1.9
PROTHROMBIN TIME (PT) IN PPP BY COAGULATION ASSAY EXTERNAL: NORMAL SECONDS

## 2023-11-01 RX ORDER — HYDROCODONE BITARTRATE AND ACETAMINOPHEN 5; 325 MG/1; MG/1
1 TABLET ORAL EVERY 6 HOURS PRN
Qty: 8 TABLET | Refills: 0 | Status: SHIPPED | OUTPATIENT
Start: 2023-11-01 | End: 2023-11-03

## 2023-11-02 ENCOUNTER — HOSPITAL ENCOUNTER (OUTPATIENT)
Facility: HOSPITAL | Age: 88
Setting detail: OUTPATIENT SURGERY
Discharge: HOME | End: 2023-11-02
Attending: ORTHOPAEDIC SURGERY | Admitting: ORTHOPAEDIC SURGERY
Payer: MEDICARE

## 2023-11-02 ENCOUNTER — ANESTHESIA EVENT (OUTPATIENT)
Dept: OPERATING ROOM | Facility: HOSPITAL | Age: 88
End: 2023-11-02
Payer: MEDICARE

## 2023-11-02 ENCOUNTER — APPOINTMENT (OUTPATIENT)
Dept: CARDIOLOGY | Facility: HOSPITAL | Age: 88
End: 2023-11-02
Payer: MEDICARE

## 2023-11-02 ENCOUNTER — ANESTHESIA (OUTPATIENT)
Dept: OPERATING ROOM | Facility: HOSPITAL | Age: 88
End: 2023-11-02
Payer: MEDICARE

## 2023-11-02 VITALS
SYSTOLIC BLOOD PRESSURE: 121 MMHG | HEIGHT: 68 IN | RESPIRATION RATE: 18 BRPM | HEART RATE: 60 BPM | BODY MASS INDEX: 38.69 KG/M2 | TEMPERATURE: 98.1 F | DIASTOLIC BLOOD PRESSURE: 77 MMHG | OXYGEN SATURATION: 95 % | WEIGHT: 255.29 LBS

## 2023-11-02 DIAGNOSIS — M65.341 TRIGGER FINGER, RIGHT RING FINGER: Primary | ICD-10-CM

## 2023-11-02 LAB
INR PPP: 2 (ref 0.9–1.1)
PROTHROMBIN TIME: 22.3 SECONDS (ref 9.8–12.8)

## 2023-11-02 PROCEDURE — 93005 ELECTROCARDIOGRAM TRACING: CPT | Mod: 59

## 2023-11-02 PROCEDURE — 2500000004 HC RX 250 GENERAL PHARMACY W/ HCPCS (ALT 636 FOR OP/ED): Performed by: PHYSICIAN ASSISTANT

## 2023-11-02 PROCEDURE — 2500000005 HC RX 250 GENERAL PHARMACY W/O HCPCS: Performed by: ANESTHESIOLOGY

## 2023-11-02 PROCEDURE — 36415 COLL VENOUS BLD VENIPUNCTURE: CPT | Performed by: ORTHOPAEDIC SURGERY

## 2023-11-02 PROCEDURE — 7100000009 HC PHASE TWO TIME - INITIAL BASE CHARGE: Performed by: ORTHOPAEDIC SURGERY

## 2023-11-02 PROCEDURE — 7100000010 HC PHASE TWO TIME - EACH INCREMENTAL 1 MINUTE: Performed by: ORTHOPAEDIC SURGERY

## 2023-11-02 PROCEDURE — 93010 ELECTROCARDIOGRAM REPORT: CPT | Performed by: INTERNAL MEDICINE

## 2023-11-02 PROCEDURE — 2500000005 HC RX 250 GENERAL PHARMACY W/O HCPCS: Performed by: ORTHOPAEDIC SURGERY

## 2023-11-02 PROCEDURE — 3700000002 HC GENERAL ANESTHESIA TIME - EACH INCREMENTAL 1 MINUTE: Performed by: ORTHOPAEDIC SURGERY

## 2023-11-02 PROCEDURE — 3600000003 HC OR TIME - INITIAL BASE CHARGE - PROCEDURE LEVEL THREE: Performed by: ORTHOPAEDIC SURGERY

## 2023-11-02 PROCEDURE — 2500000004 HC RX 250 GENERAL PHARMACY W/ HCPCS (ALT 636 FOR OP/ED): Performed by: ANESTHESIOLOGY

## 2023-11-02 PROCEDURE — 85610 PROTHROMBIN TIME: CPT | Performed by: ORTHOPAEDIC SURGERY

## 2023-11-02 PROCEDURE — 3700000001 HC GENERAL ANESTHESIA TIME - INITIAL BASE CHARGE: Performed by: ORTHOPAEDIC SURGERY

## 2023-11-02 PROCEDURE — 26055 INCISE FINGER TENDON SHEATH: CPT | Performed by: ORTHOPAEDIC SURGERY

## 2023-11-02 PROCEDURE — 3600000008 HC OR TIME - EACH INCREMENTAL 1 MINUTE - PROCEDURE LEVEL THREE: Performed by: ORTHOPAEDIC SURGERY

## 2023-11-02 RX ORDER — LIDOCAINE HYDROCHLORIDE 5 MG/ML
INJECTION, SOLUTION INFILTRATION; INTRAVENOUS AS NEEDED
Status: DISCONTINUED | OUTPATIENT
Start: 2023-11-02 | End: 2023-11-02

## 2023-11-02 RX ORDER — SODIUM CHLORIDE, SODIUM LACTATE, POTASSIUM CHLORIDE, CALCIUM CHLORIDE 600; 310; 30; 20 MG/100ML; MG/100ML; MG/100ML; MG/100ML
100 INJECTION, SOLUTION INTRAVENOUS CONTINUOUS
Status: DISCONTINUED | OUTPATIENT
Start: 2023-11-02 | End: 2023-11-02 | Stop reason: HOSPADM

## 2023-11-02 RX ORDER — CEFAZOLIN SODIUM 2 G/100ML
2 INJECTION, SOLUTION INTRAVENOUS ONCE
Status: COMPLETED | OUTPATIENT
Start: 2023-11-02 | End: 2023-11-02

## 2023-11-02 RX ORDER — BUPIVACAINE HYDROCHLORIDE 5 MG/ML
INJECTION, SOLUTION PERINEURAL AS NEEDED
Status: DISCONTINUED | OUTPATIENT
Start: 2023-11-02 | End: 2023-11-02 | Stop reason: HOSPADM

## 2023-11-02 RX ORDER — FENTANYL CITRATE 50 UG/ML
INJECTION, SOLUTION INTRAMUSCULAR; INTRAVENOUS AS NEEDED
Status: DISCONTINUED | OUTPATIENT
Start: 2023-11-02 | End: 2023-11-02

## 2023-11-02 RX ADMIN — LIDOCAINE HYDROCHLORIDE 40 ML: 5 INJECTION, SOLUTION INFILTRATION at 07:37

## 2023-11-02 RX ADMIN — SODIUM CHLORIDE, POTASSIUM CHLORIDE, SODIUM LACTATE AND CALCIUM CHLORIDE 100 ML/HR: 600; 310; 30; 20 INJECTION, SOLUTION INTRAVENOUS at 06:45

## 2023-11-02 RX ADMIN — FENTANYL CITRATE 50 MCG: 50 INJECTION, SOLUTION INTRAMUSCULAR; INTRAVENOUS at 07:42

## 2023-11-02 RX ADMIN — CEFAZOLIN SODIUM 2 G: 2 INJECTION, SOLUTION INTRAVENOUS at 07:32

## 2023-11-02 RX ADMIN — FENTANYL CITRATE 50 MCG: 50 INJECTION, SOLUTION INTRAMUSCULAR; INTRAVENOUS at 07:38

## 2023-11-02 ASSESSMENT — COLUMBIA-SUICIDE SEVERITY RATING SCALE - C-SSRS
2. HAVE YOU ACTUALLY HAD ANY THOUGHTS OF KILLING YOURSELF?: NO
1. IN THE PAST MONTH, HAVE YOU WISHED YOU WERE DEAD OR WISHED YOU COULD GO TO SLEEP AND NOT WAKE UP?: NO
6. HAVE YOU EVER DONE ANYTHING, STARTED TO DO ANYTHING, OR PREPARED TO DO ANYTHING TO END YOUR LIFE?: NO

## 2023-11-02 ASSESSMENT — PAIN SCALES - GENERAL
PAINLEVEL_OUTOF10: 0 - NO PAIN
PAINLEVEL_OUTOF10: 0 - NO PAIN
PAIN_LEVEL: 0
PAINLEVEL_OUTOF10: 0 - NO PAIN

## 2023-11-02 ASSESSMENT — PAIN - FUNCTIONAL ASSESSMENT
PAIN_FUNCTIONAL_ASSESSMENT: 0-10
PAIN_FUNCTIONAL_ASSESSMENT: 0-10

## 2023-11-02 NOTE — ANESTHESIA PREPROCEDURE EVALUATION
Patient: Vinicius Arriola    Procedure Information       Date/Time: 11/02/23 6830    Procedure: TRIGGER FINGER RELEASE - 4TH DIGIT (Right: Fingers) - IV HUBERT BLOCK    Location: [facility] / [facility]    Surgeons: Dinh Mathis MD            Relevant Problems   Anesthesia (within normal limits)      Cardiovascular   (+) Atrial fibrillation (CMS/HCC)   (+) Atrial flutter (CMS/HCC)   (+) Benign essential hypertension   (+) CAD (coronary atherosclerotic disease)   (+) Chest pain   (+) Mitral regurgitation   (+) Mixed hyperlipidemia   (+) Pacemaker   (+) Sinus node dysfunction (CMS/HCC)      Endocrine   (+) Class 2 obesity with body mass index (BMI) of 38.0 to 38.9 in adult      Hematology   (+) Long term (current) use of anticoagulants   (+) Polycythemia vera (CMS/HCC)      Musculoskeletal   (+) Knee osteoarthritis      Other   (+) Arthritis   (+) Patellofemoral arthritis   (+) Polycythemia vera (CMS/HCC)       Clinical information reviewed:   Tobacco  Allergies  Meds   Med Hx  Surg Hx   Fam Hx  Soc Hx        NPO Detail:  NPO/Void Status  Date of Last Liquid: 11/01/23  Time of Last Liquid: 1800  Date of Last Solid: 11/01/23  Time of Last Solid: 1800         Physical Exam    Airway  Mallampati: II     Cardiovascular   Rhythm: regular  Rate: normal     Dental    Pulmonary - normal exam     Abdominal - normal exam             Anesthesia Plan    ASA 3     MAC     intravenous induction   Postoperative administration of opioids is intended.  Anesthetic plan and risks discussed with patient.

## 2023-11-02 NOTE — ANESTHESIA PROCEDURE NOTES
Peripheral Block    Patient location during procedure: OR  Start time: 11/2/2023 7:37 AM  End time: 11/2/2023 7:38 AM  Reason for block: at surgeon's request  Staffing  Performed: [facility]   Authorized by: ASHLEY Alba    Performed by: ASHLEY Alba  Preanesthetic Checklist  Completed: patient identified, IV checked, site marked, risks and benefits discussed, surgical consent, monitors and equipment checked, pre-op evaluation and timeout performed   Timeout performed at: 11/2/2023 7:30 AM  Peripheral Block  Patient position: laying flat  Prep: alcohol swabs  Patient monitoring: heart rate, cardiac monitor and continuous pulse ox  Block type: Irvin block  Laterality: right  Injection technique: single-shot  Local infiltration: lidocaine  Infiltration strength: 0.5 %  Dose: 40 mL  Assessment  Paresthesia pain: none  Heart rate change: no

## 2023-11-02 NOTE — DISCHARGE INSTRUCTIONS
Medication given may have significant effects after discharge. Therefore on the day of surgery:  1) you must be accompanied by a responsible adult upon discharge and for 24 hours after surgery.  Do not drive a motor vehicle, operate machinery, power tools or appliance, drink alcoholic beverages, or make critical decisions for 24 hours  2) Be aware of dizziness, which may cause a fall. Change positions slowly.  3) Eating: you may resume your regular diet but it is better to increase intake slowly with mild foods and working up to your regular diet. No greasy, fried or spicy foods today.  4) Nausea/Vomiting: Nausea and vomiting may occur as you become more active or begin to increase food intake. If this should happen, decrease activity and return to liquids. If the problem persists, call your surgeon  5) Pain: Your surgeon may have given you a prescription for pain medication. Take pain medication with food as prescribed. Pain medication may cause constipation, so drink plenty of fluids. If your pain medication does not provide adequate relief, call your surgeon  6) Urinating: Notify your surgeon if you have not urinated within 12 hours after discharge  7) Ice: Apply ice to operative site for 20 min 5-6 times a day or use Polar care as instructed  8) Dressing:   []   Remove dressing in 24hr    [x]  Remove dressing in 48hr   []  Leave open to air after initial dressing is taken off and incision is dry  Do not remove the steri-strips. (no bath/ hot tubs/ pools)   []  Leave dressing in place. Keep dressing/ incision clean and dry    9) Activity    Shoulder/ elbow/Hand   [x]  Elevate extremity    [x]  Sling   [] at all times (except for exercises and showering)  [x] as needed only for comfort   [] Begin daily motion exercises out of sling as instructed   [x]  Bend and flex fingers frequently   [] other   Knee/ Ankle/ Foot   [] elevate extremity   [] crutches        [] non-weight bearing to operative extremity  []  partial weight bearing  [] Full weight bearing as tolerated   []  Use brace whenever walking   [] other      10) Begin physical therapy if advised by you physician:   [] before returning to see you doctor   [x] not until you follow up with your doctor    11) call your doctor at [phone] for an appointment (or follow up as scheduled)    Contact [facility] office if  Increased redness, swelling, drainage of any kind, and/or pain to surgery site.  As well as new onset fevers and or chills.  These could signify an infection.  Calf or thigh tenderness to touch as well as increased swelling or redness.  This could signify a clot formation.  Numbness or tingling to an area around the incision site or below the incision site (toes). Or if the operative extremity becomes cold, blue.  Any rash appears, increased  or new onset nausea/vomiting occur.  This may indicate a reaction to a medication.  Temp is 38.5 C (101F)  12) If you have any concerns or questions, please call [facility] surgeon on call. The 24- hour phone is [phone]  13) If you are unable to contact your surgeon, in an emergency situation, go to the nearest hospital    Nerve Blocks    Why is this procedure done?  Nerve blocks can help to manage pain. By giving you a drug into an exact group of nerves, your doctor may be able to block pain to a specific part of your body. Some nerve blocks are used after surgery, especially if you have had an abdominal surgery. Nerve blocks are used before surgery to lessen the need for opioid drugs during and after surgery.  There are a few kinds of nerve blocks. Some nerve blocks are used to:  Treat pain. These may have a pain drug and a drug to help with swelling.  Find where your pain is coming from. This kind of nerve block will have a pain drug that lasts for only a certain amount of time.  See if another kind of treatment like surgery will help your pain.  Prevent pain during or after a  procedure.  Help you avoid surgery.  Give relief of pain during and after surgery.  Lessen the use of opioid drugs needed for pain after surgery.  Block the pain in an area of the body during surgery as anesthesia.  What will the results be?  The nerve block may help to treat or ease your pain. The area may be numb. You may have some pain relief right away. You may be able to use fewer pain medicines after surgery. It also may be easier for you to move around after surgery. Some nerve blocks go away within a few hours. Others give you pain relief for a day or so to a few months or longer. Some nerve blocks can take a few days to work fully.  What happens before the procedure?  Your doctor will ask you about your health history. Talk to the doctor about:  All the drugs you are taking. Be sure to include all prescription, over the counter, and herbal supplements. Tell the doctor if you have any drug allergy. Bring a list of drugs you take with you.  Any bleeding problems. Be sure to tell your doctor if you are taking any drugs that may cause bleeding. Some of these are warfarin, rivaroxaban, apixaban, ticagrelor, clopidogrel, ibuprofen, naproxen, or aspirin. Certain vitamins and herbs, such as garlic and fish oil, may also add to the risk for bleeding. You may need to stop these drugs as well. Talk to your doctor about them.  What happens during the procedure?  Sometimes, the doctor will give you a special drug to make you sleepy for the nerve block. Other times, you are completely awake. You may also have a nerve block as a part of your surgery.  The doctor will position you in a way to give them easy access to where you will be having the nerve block.  The doctor will clean the area and give you a local numbing drug. The doctor will use a long thin needle to give you the nerve block. Often, the doctor will use a special x-ray, ultrasound, or CT scan to make sure the needle is in the right place. The doctor will  inject the drug close to the nerve that is causing your pain. Sometimes they inject the drug in an area that will block pain from a few nerves.  The doctor will take out the needle and place a clean bandage on your skin.  Sometimes, the doctor may leave a catheter in place to deliver medicine over 1 to 2 days. You may have to return to your doctor's office to have the catheter removed.  The procedure takes 15 to 30 minutes.  What happens after the procedure?  If you are not having surgery, you will be able to go home the same day. You may be asked to rest for 15 to 30 minutes. If the doctor gives you a special drug to make you sleepy for the procedure, you will need someone to drive you home.  What care is needed at home?  You will be allowed to shower or take a bath later that same day unless you have a catheter still in place.  You may need other medicines to help with pain as your nerve block wears off.  What follow-up care is needed?  As your body absorbs the drugs, your pain may come back. Talk to your doctor about if you need another nerve block and how often you can have a nerve block.  What problems could happen?  Infection  Bleeding or bruising  Pain at the injection site  Raised blood sugar  Rash  Itching  Numbness  Nerve injury  Allergic reaction  Puncture or laceration of an organ like the liver, spleen. or bowel  Numbing medicine injected into a blood vessel  Where can I learn more?  American Society of Regional Anesthesia  https://www.anna marie.com/patient-information/regional-anesthesia  NHS  https://www.nhs.uk/conditions/epidural/  [facility]HS  https://www.nhs.uk/conditions/local-anaesthesia/  [employer] Society of [place]  http://www.radiologyinfo.org/en/info.cfm?pg=nerveblock  Last Reviewed Date  2020-04-22    Moderate Sedation in Adults Discharge Instructions    About this topic  Moderate sedation is also known as conscious sedation. It changes your state of being awake or consciousness. With this  sedation, you may feel slight pain or pressure during a procedure. The drugs help you to relax and may even allow you to sleep. It will be easy to wake you and you may talk and answer questions while under sedation. Most likely, you will not remember what happens while under this sedation.  What care is needed at home?  Ask your doctor what you need to do when you go home. Make sure you understand everything the doctor says. This way you will know what you need to do.  You will not be allowed to drive right away after the procedure. Ask a family member or a friend to drive you home.  Do not operate heavy or dangerous machinery for at least 24 hours.  Do not make major decisions or sign important papers for at least 24 hours. You may not be thinking clearly.  Avoid beer, wine, or mixed drinks (alcohol) for at least 24 hours.  You are at a higher risk of falling for at least 24 hours after moderate sedation.  Take extra care when you get up.  Do not change positions quickly.  Do not rush when you need to go to the bathroom or to answer the phone.  Ask for help if you feel unsteady when you try to walk.  Wear shoes with non-slip soles and low heels.  What follow-up care is needed?  Your doctor may ask you to make visits to the office to check on your progress. Be sure to keep these visits. Your doctor may also refer you to other doctors or tell you that you need more tests or care.  What drugs may be needed?  The doctor may order drugs to:  Help with pain  Treat an upset stomach or throwing up  Will physical activity be limited?  Rest for the day of the procedure. Avoid strenuous activities like heavy lifting and hard exercise. Talk to your doctor about whether you need to limit lifting or exercise after your procedure.  What changes to diet are needed?  Start with a light diet when you are fully awake. This includes things that are easy to swallow like soups, pudding, jello, toast, and eggs. Slowly progress to your  normal diet.  What problems could happen?  Low blood pressure  Breathing problems  Upset stomach or throwing up  Dizziness  When do I need to call the doctor?  Feel dizzy, weak, or tired  Faint  Very bad headache  Upset stomach or throwing up  To follow up for more tests or care  Teach Back: Helping You Understand  The Teach Back Method helps you understand the information we are giving you. After you talk with the staff, tell them in your own words what you learned. This helps to make sure the staff has described each thing clearly. It also helps to explain things that may have been confusing. Before going home, make sure you can do these:  I can tell you about my procedure.  I can tell you if I need more tests or care.  I can tell you what is good for me to eat and drink the next day.  I can tell you what I would do if I feel dizzy, weak, or tired.  Last Reviewed Date  2020-03-02

## 2023-11-02 NOTE — OP NOTE
TRIGGER FINGER RELEASE - 4TH DIGIT (R) Operative Note    Preop diagnosis: Right fourth trigger finger    Postop diagnosis: Same    Procedure: Right fourth trigger finger release    Surgeon: Dinh Mathis MD  Assistant: Jana Lake PA-C      Findings: see procedure details    EBL: minimal    Procedure Details:   Indications: The patient had a history of a right trigger finger fourth digit.  The patient had failed multiple conservative measures and elected to proceed with trigger thumb release.  The benefits of trigger finger surgery were noted with the patient and family.  These include infection, stiffness, nerve damage, continued pain and triggering as well as need for subsequent operations.  Informed consent was obtained prior trial [place].    Procedure: Upon arrival the patient was identified.  The patient was transferred from a stretcher the operative table and placed in the supine position.  A [place] block and IV sedation were administered by the anesthesia staff.  Prior to the start of the case appropriate antibiotics were administered intravenously.  The patient's right hand was prepped and draped in the usual sterile fashion.  A 1 cm incision was made over the A1 pulley in the affected digit.  The incision was carried through the subcutaneous tissue hemostasis was obtained.  The pulley was identified and incised longitudinally freeing the flexor tendon.  The tendon was elevated and noted to be free of any large nodules.  The patient was able to actively flex and extend the digit without any active triggering.  The wound irrigated with sterile saline and closed with 4-0 nylon suture.  After closure 5 cc of Marcaine was Injected around the Wound for Postoperative Pain Control.  A Sterile Dressing Was Applied.  The patient was awoken from the sedation and taken to the recovery room in stable condition.    Jana Lake PA-C was present throughout the entire case. Given the nature of the disease  process and the procedure, a skilled surgical first assistant was necessary during the case. The assistant was necessary to hold retractors and to manipulate the extremity during the procedure. A certified scrub tech was at the back table managing the instruments and supplies for the surgical case.    Complications:  None; patient tolerated the procedure well.    Disposition: PACU - hemodynamically stable.  Condition: stable         Dinh Mathis  Phone Number: [phone]

## 2023-11-02 NOTE — ANESTHESIA POSTPROCEDURE EVALUATION
Patient: Vinicius Arriola    Procedure Summary       Date: 11/02/23 Room / Location: [facility] 04 / [facility]    Anesthesia Start: 0725 Anesthesia Stop:     Procedure: TRIGGER FINGER RELEASE - 4TH DIGIT (Right: Fingers) Diagnosis:       Trigger finger, right ring finger      (Trigger finger, right ring finger [M65.341])    Surgeons: Dinh Mathis MD Responsible Provider: ASHLEY Alba    Anesthesia Type: MAC ASA Status: 3            Anesthesia Type: MAC    Vitals Value Taken Time   /84 11/02/23 0749   Temp 36 11/02/23 0749   Pulse 60 11/02/23 0749   Resp 16 11/02/23 0749   SpO2 94 11/02/23 0749       Anesthesia Post Evaluation    Patient location during evaluation: bedside  Patient participation: complete - patient participated  Level of consciousness: awake and alert  Pain score: 0  Pain management: adequate  Airway patency: patent  Cardiovascular status: acceptable, blood pressure returned to baseline and hemodynamically stable  Respiratory status: acceptable, unassisted and room air  Hydration status: acceptable      There were no known notable events for this encounter.

## 2023-11-05 LAB
ATRIAL RATE: 61 BPM
P AXIS: -11 DEGREES
P OFFSET: 161 MS
P ONSET: 122 MS
PR INTERVAL: 304 MS
Q ONSET: 226 MS
QRS COUNT: 10 BEATS
QRS DURATION: 86 MS
QT INTERVAL: 412 MS
QTC CALCULATION(BAZETT): 414 MS
QTC FREDERICIA: 414 MS
R AXIS: 46 DEGREES
T AXIS: 16 DEGREES
T OFFSET: 432 MS
VENTRICULAR RATE: 61 BPM

## 2023-11-07 ENCOUNTER — ANTICOAGULATION - WARFARIN VISIT (OUTPATIENT)
Dept: CARDIOLOGY | Facility: CLINIC | Age: 88
End: 2023-11-07
Payer: MEDICARE

## 2023-11-08 ENCOUNTER — OFFICE VISIT (OUTPATIENT)
Dept: ORTHOPEDIC SURGERY | Facility: CLINIC | Age: 88
End: 2023-11-08
Payer: MEDICARE

## 2023-11-08 DIAGNOSIS — M65.341 TRIGGER FINGER, RIGHT RING FINGER: ICD-10-CM

## 2023-11-08 PROCEDURE — 3078F DIAST BP <80 MM HG: CPT | Performed by: PHYSICIAN ASSISTANT

## 2023-11-08 PROCEDURE — 1036F TOBACCO NON-USER: CPT | Performed by: PHYSICIAN ASSISTANT

## 2023-11-08 PROCEDURE — 99024 POSTOP FOLLOW-UP VISIT: CPT | Performed by: PHYSICIAN ASSISTANT

## 2023-11-08 PROCEDURE — 1126F AMNT PAIN NOTED NONE PRSNT: CPT | Performed by: PHYSICIAN ASSISTANT

## 2023-11-08 PROCEDURE — 3074F SYST BP LT 130 MM HG: CPT | Performed by: PHYSICIAN ASSISTANT

## 2023-11-08 PROCEDURE — 1159F MED LIST DOCD IN RCRD: CPT | Performed by: PHYSICIAN ASSISTANT

## 2023-11-08 NOTE — PROGRESS NOTES
History of Present Illness: Vinicius Arriola is here today for a post op visit.  He is one week out from a 4th digit trigger finger release.  He is having normal post operative soreness, but overall doing well.     Physical Exam: The wound is healing nicely. No redness or drainage.  Swelling and ecchymosis are normal for this stage of healing. FDP and FDS functions are intact. There is no active triggering. No numbness or tingling.     Radiographs: None today.    Assessment: Stable right 4th digit trigger finger release, one week out.     Plan:   Sutures were removed.  Benzoin and steri strips were applied.  He can continue to use the hand as tolerated, but understands the need to protect the incision as it continues to heal.   We discussed it will be another 2-3 weeks for full healing.  We would be happy to see him back if he should have any issues. Otherwise, if doing well, he can follow up on an as-needed basis.  All questions were answered with the patient.

## 2023-11-14 ENCOUNTER — ANTICOAGULATION - WARFARIN VISIT (OUTPATIENT)
Dept: CARDIOLOGY | Facility: CLINIC | Age: 88
End: 2023-11-14
Payer: MEDICARE

## 2023-11-14 LAB
INR IN PPP BY COAGULATION ASSAY EXTERNAL: 3.2
PROTHROMBIN TIME (PT) IN PPP BY COAGULATION ASSAY EXTERNAL: NORMAL SECONDS

## 2023-11-22 ENCOUNTER — ANTICOAGULATION - WARFARIN VISIT (OUTPATIENT)
Dept: CARDIOLOGY | Facility: CLINIC | Age: 88
End: 2023-11-22
Payer: MEDICARE

## 2023-11-22 LAB
INR IN PPP BY COAGULATION ASSAY EXTERNAL: 1.8
PROTHROMBIN TIME (PT) IN PPP BY COAGULATION ASSAY EXTERNAL: NORMAL SECONDS

## 2023-11-28 ENCOUNTER — ANTICOAGULATION - WARFARIN VISIT (OUTPATIENT)
Dept: CARDIOLOGY | Facility: CLINIC | Age: 88
End: 2023-11-28
Payer: MEDICARE

## 2023-11-28 LAB
INR IN PPP BY COAGULATION ASSAY EXTERNAL: 3
PROTHROMBIN TIME (PT) IN PPP BY COAGULATION ASSAY EXTERNAL: NORMAL SECONDS

## 2023-12-05 ENCOUNTER — ANTICOAGULATION - WARFARIN VISIT (OUTPATIENT)
Dept: CARDIOLOGY | Facility: CLINIC | Age: 88
End: 2023-12-05
Payer: MEDICARE

## 2023-12-05 LAB
INR IN PPP BY COAGULATION ASSAY EXTERNAL: 2.9
PROTHROMBIN TIME (PT) IN PPP BY COAGULATION ASSAY EXTERNAL: NORMAL SECONDS

## 2023-12-06 ENCOUNTER — OFFICE VISIT (OUTPATIENT)
Dept: CARDIOLOGY | Facility: CLINIC | Age: 88
End: 2023-12-06
Payer: MEDICARE

## 2023-12-06 ENCOUNTER — HOSPITAL ENCOUNTER (OUTPATIENT)
Dept: CARDIOLOGY | Facility: HOSPITAL | Age: 88
Discharge: HOME | End: 2023-12-06
Payer: MEDICARE

## 2023-12-06 VITALS
BODY MASS INDEX: 38.65 KG/M2 | HEART RATE: 66 BPM | SYSTOLIC BLOOD PRESSURE: 110 MMHG | WEIGHT: 255 LBS | DIASTOLIC BLOOD PRESSURE: 70 MMHG | HEIGHT: 68 IN

## 2023-12-06 DIAGNOSIS — Z51.81 ANTICOAGULATION MANAGEMENT ENCOUNTER: ICD-10-CM

## 2023-12-06 DIAGNOSIS — Z87.891 FORMER SMOKER: ICD-10-CM

## 2023-12-06 DIAGNOSIS — I48.91 ATRIAL FIBRILLATION, UNSPECIFIED TYPE (MULTI): ICD-10-CM

## 2023-12-06 DIAGNOSIS — Z95.0 PACEMAKER: Primary | ICD-10-CM

## 2023-12-06 DIAGNOSIS — R00.1 SEVERE SINUS BRADYCARDIA: ICD-10-CM

## 2023-12-06 DIAGNOSIS — Z79.01 ANTICOAGULATION MANAGEMENT ENCOUNTER: ICD-10-CM

## 2023-12-06 DIAGNOSIS — I48.0 PAROXYSMAL ATRIAL FIBRILLATION (MULTI): ICD-10-CM

## 2023-12-06 DIAGNOSIS — Z95.0 PACEMAKER: ICD-10-CM

## 2023-12-06 PROCEDURE — 93290 INTERROG DEV EVAL ICPMS IP: CPT | Performed by: INTERNAL MEDICINE

## 2023-12-06 PROCEDURE — 93280 PM DEVICE PROGR EVAL DUAL: CPT

## 2023-12-06 PROCEDURE — 93000 ELECTROCARDIOGRAM COMPLETE: CPT | Performed by: INTERNAL MEDICINE

## 2023-12-06 PROCEDURE — 1159F MED LIST DOCD IN RCRD: CPT | Performed by: INTERNAL MEDICINE

## 2023-12-06 PROCEDURE — 3074F SYST BP LT 130 MM HG: CPT | Performed by: INTERNAL MEDICINE

## 2023-12-06 PROCEDURE — 1036F TOBACCO NON-USER: CPT | Performed by: INTERNAL MEDICINE

## 2023-12-06 PROCEDURE — 93280 PM DEVICE PROGR EVAL DUAL: CPT | Performed by: INTERNAL MEDICINE

## 2023-12-06 PROCEDURE — 99214 OFFICE O/P EST MOD 30 MIN: CPT | Performed by: INTERNAL MEDICINE

## 2023-12-06 PROCEDURE — 1126F AMNT PAIN NOTED NONE PRSNT: CPT | Performed by: INTERNAL MEDICINE

## 2023-12-06 PROCEDURE — 3078F DIAST BP <80 MM HG: CPT | Performed by: INTERNAL MEDICINE

## 2023-12-06 RX ORDER — IBUPROFEN 100 MG/5ML
1000 SUSPENSION, ORAL (FINAL DOSE FORM) ORAL DAILY
Status: ON HOLD | COMMUNITY

## 2023-12-06 NOTE — PATIENT INSTRUCTIONS
Continue same medications/treatment.  Patient educated on proper medication use.  Patient educated on risk factor modification.  Please bring any lab results from other providers/physicians to your next appointment.    Please bring all medicines, vitamins, and herbal supplements with you when you come to the office.    Prescriptions will not be filled unless you are compliant with your follow up appointments or have a follow up appointment scheduled as per instruction of your physician. Refills should be requested at the time of your visit.    Follow up with [facility] in 6 months with device check  Follow up with Dr. Andrew in 12 months with device check  Continue remote checks at 3 and 9 months    TORSTEN LEON RN, AM SCRIBING FOR, AND IN THE PRESENCE OF DR. EDVIN ANDREW MD

## 2023-12-06 NOTE — PROGRESS NOTES
CARDIOLOGY OFFICE VISIT      CHIEF COMPLAINT  Chief Complaint   Patient presents with    Follow-up     Patient presented today for cardiac follow up.       HISTORY OF PRESENT ILLNESS  HPI     88-year-old  male who is followed for coronary artery disease status post remote PTCA, hypertension, dyslipidemia, polycythemia vera and benign prostatic hypertrophy. He has a history of sinus node dysfunction status post dual-chamber pacemaker implant on July 19, 2022 and paroxysmal atrial fibrillation with periods of rapid ventricular response controlled on metoprolol. Patient states that he was hospitalized at  for 4 weeks. He was admitted with shortness of breath and treated for pneumonia and a left pleural effusion. Patient's wife states that a chest tube was placed which drained greater than 2 L of fluid from his lungs.     Since the last office visit he states that he has been doing well.  Denies any symptoms of chest pain or shortness of breath or palpitations.    He still on warfarin therapy.    EKG performed today shows atrial paced rhythm rate of 66 bpm rotation 74 ms QT corrected 423 ms.  Rhythm strip shows the same pattern.    Patient had a device interrogation performed today at the device clinic and it shows adequate sensing, capture impedances of all leads.  No evidence of atrial fibrillation.  Battery longevity 12 years.        Past Medical History  Past Medical History:   Diagnosis Date    Acute embolism and thrombosis of unspecified deep veins of unspecified lower extremity (CMS/HCC)     Acute embolism and thrombosis of unspecified deep veins of unspecified lower extremity    Dental disease     Upper and lower dentures    Encounter for preprocedural cardiovascular examination 11/02/2021    Pre-operative cardiovascular examination    Obesity, unspecified 07/15/2022    Class 2 obesity with body mass index (BMI) of 38.0 to 38.9 in adult    Personal history of diseases of the blood  and blood-forming organs and certain disorders involving the immune mechanism     History of polycythemia    Personal history of other diseases of male genital organs     History of benign prostatic hyperplasia    Personal history of other diseases of the circulatory system     History of hypertension    Personal history of other diseases of the circulatory system     History of cardiac arrhythmia    Personal history of other diseases of the circulatory system 10/21/2021    History of abnormal electrocardiography    Personal history of other diseases of the circulatory system     History of essential hypertension    Personal history of other diseases of the nervous system and sense organs     History of sleep apnea    Personal history of other malignant neoplasm of skin     History of malignant neoplasm of skin    Personal history of other specified conditions     History of balance disorder    Personal history of other venous thrombosis and embolism     History of deep venous thrombosis       Social History  Social History     Tobacco Use    Smoking status: Former     Packs/day: 1     Types: Cigarettes     Start date:      Quit date:      Years since quittin.9    Smokeless tobacco: Never   Vaping Use    Vaping Use: Never used   Substance Use Topics    Alcohol use: Yes     Alcohol/week: 4.0 standard drinks of alcohol     Types: 4 Cans of beer per week    Drug use: Never       Family History     Family History   Problem Relation Name Age of Onset    Accidental death Mother      Coronary artery disease Mother      Other (Cardiac disorder) Father      Dementia Father      Cancer Father      Colon cancer Daughter          Allergies:  Allergies   Allergen Reactions    Benadryl Allergy Decongestant Anxiety and Insomnia    Diphenhydramine Hcl Anxiety and Insomnia        Outpatient Medications:  Current Outpatient Medications   Medication Instructions    ascorbic acid (VITAMIN C) 1,000 mg, oral, Daily     carboxymethylcellulose (Refresh Celluvisc) 1 % ophthalmic solution dropperette 1 drop, ophthalmic (eye), Every morning    ergocalciferol (VITAMIN D-2) 1.25 mg, oral, Weekly    ferrous sulfate 65 mg, oral, 2 times daily with meals, Do not crush, chew, or split.    furosemide (Lasix) 20 mg tablet 1 tablet, oral, Daily    melatonin 5 mg, oral, Nightly PRN    metoprolol tartrate (LOPRESSOR) 25 mg, oral, 2 times daily    nitroglycerin (Nitrostat) 0.4 mg SL tablet sublingual, Every 5 min PRN    omega-3 fatty acids-fish oil 360-1,200 mg capsule 1 capsule, oral, 2 times daily    potassium chloride CR 20 mEq ER tablet 0.5 tablets, oral, Daily    rosuvastatin (Crestor) 10 mg tablet 1 tablet, oral, Daily    traZODone (DESYREL) 100 mg, oral, Nightly    warfarin (Coumadin) 1 mg tablet 1-2 tablets, oral, Take 1-2 tablets daily or as directed per [facility]<BR>Take as directed per After Visit Summary.    warfarin (Coumadin) 4 mg tablet TAKE AS DIRECTED Per [facility]    ZINC ORAL 1 tablet, oral, Daily          REVIEW OF SYSTEMS  Review of Systems   All other systems reviewed and are negative.        VITALS  Vitals:    12/06/23 1431   BP: 110/70   Pulse: 66       PHYSICAL EXAM  Constitutional:       General: Awake.      Appearance: Normal and healthy appearance. Well-developed and not in distress.   Neck:      Vascular: No JVR. JVD normal.   Pulmonary:      Effort: Pulmonary effort is normal.      Breath sounds: Normal breath sounds. No wheezing. No rhonchi. No rales.   Chest:      Chest wall: Not tender to palpatation.   Cardiovascular:      PMI at left midclavicular line. Normal rate. Regular rhythm. Normal S1. Normal S2.       Murmurs: There is no murmur.      No gallop.  No click. No rub.   Pulses:     Intact distal pulses.   Edema:     Feet: bilateral 1+ edema of the feet.  Abdominal:      Tenderness: There is no abdominal tenderness.   Musculoskeletal: Normal range of motion.         General: No  tenderness. Skin:     General: Skin is warm and dry.   Neurological:      General: No focal deficit present.      Mental Status: Alert and oriented to person, place and time.   Psychiatric:         Behavior: Behavior is cooperative.           ASSESSMENT AND PLAN    Clinical impressions:  1. Sinus node dysfunction with twelve-lead EKG today revealing marked sinus bradycardia with a first-degree AV block on low-dose beta-blockade.  2. Paroxysmal atrial fibrillation with rapid ventricular response controlled on metoprolol and anticoagulated with warfarin.  3. Traumatic left pneumothorax per hospitalization dated April 29, 2022 status post VATS procedure, partial decortication, mechanical pleurodesis, and biopsy for secondary spontaneous pneumothorax.  4. Coronary artery disease status post remote PTCA.  5. Dyslipidemia on statin.  6. Hypertension, controlled with a blood pressure today 122/64.  7. Morbid obesity with a BMI of 37.1.  8. Polycythemia vera followed by Dr. Gamboa.  9. Benign prostatic hypertrophy.  10. 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 Thu Ann. Status post 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 treatments.  11. Temporary spinal stimulator pending placement of a permanent stimulator at [facility] on May 8, 2023.    Plan-recommendations    From the electrophysiologist on point he is doing well.  Will continue with current medical therapy that includes warfarin therapy and beta-blocker therapy.    Follow my office every 6 months or sooner needed.    Follow device clinic as scheduled.    Risk factor modification and lifestyle modification discussed with patient. Diet , exercise and hydration discussed with patient.    I have personally review with patient during this office visit, laboratory data, echocardiogram results, stress test results, Holter-event monitor results prior and after the last electrophysiology visit. All questions has been answered.    Please excuse any  errors in grammar or translation related to this dictation.  Voice recognition software was utilized to prepare this document.

## 2023-12-10 ENCOUNTER — APPOINTMENT (OUTPATIENT)
Dept: RADIOLOGY | Facility: HOSPITAL | Age: 88
DRG: 177 | End: 2023-12-10
Payer: MEDICARE

## 2023-12-10 ENCOUNTER — HOSPITAL ENCOUNTER (INPATIENT)
Facility: HOSPITAL | Age: 88
LOS: 5 days | Discharge: NURSING, REHAB OR LONG-TERM CARE | DRG: 177 | End: 2023-12-15
Attending: EMERGENCY MEDICINE | Admitting: STUDENT IN AN ORGANIZED HEALTH CARE EDUCATION/TRAINING PROGRAM
Payer: MEDICARE

## 2023-12-10 ENCOUNTER — APPOINTMENT (OUTPATIENT)
Dept: CARDIOLOGY | Facility: HOSPITAL | Age: 88
DRG: 177 | End: 2023-12-10
Payer: MEDICARE

## 2023-12-10 DIAGNOSIS — R79.1 SUPRATHERAPEUTIC INR: ICD-10-CM

## 2023-12-10 DIAGNOSIS — J96.01 ACUTE RESPIRATORY FAILURE WITH HYPOXIA (MULTI): ICD-10-CM

## 2023-12-10 DIAGNOSIS — U07.1 COVID: Primary | ICD-10-CM

## 2023-12-10 DIAGNOSIS — R93.89 ABNORMAL CHEST CT: ICD-10-CM

## 2023-12-10 LAB
ALBUMIN SERPL BCP-MCNC: 3.8 G/DL (ref 3.4–5)
ALP SERPL-CCNC: 63 U/L (ref 33–136)
ALT SERPL W P-5'-P-CCNC: 11 U/L (ref 10–52)
ANION GAP SERPL CALC-SCNC: 11 MMOL/L (ref 10–20)
AST SERPL W P-5'-P-CCNC: 15 U/L (ref 9–39)
BASOPHILS # BLD AUTO: 0.02 X10*3/UL (ref 0–0.1)
BASOPHILS NFR BLD AUTO: 0.2 %
BILIRUB SERPL-MCNC: 0.4 MG/DL (ref 0–1.2)
BNP SERPL-MCNC: 154 PG/ML (ref 0–99)
BUN SERPL-MCNC: 19 MG/DL (ref 6–23)
CALCIUM SERPL-MCNC: 9.3 MG/DL (ref 8.6–10.3)
CARDIAC TROPONIN I PNL SERPL HS: 15 NG/L (ref 0–20)
CARDIAC TROPONIN I PNL SERPL HS: 17 NG/L (ref 0–20)
CHLORIDE SERPL-SCNC: 99 MMOL/L (ref 98–107)
CO2 SERPL-SCNC: 27 MMOL/L (ref 21–32)
CREAT SERPL-MCNC: 1.36 MG/DL (ref 0.5–1.3)
EOSINOPHIL # BLD AUTO: 0.09 X10*3/UL (ref 0–0.4)
EOSINOPHIL NFR BLD AUTO: 0.8 %
ERYTHROCYTE [DISTWIDTH] IN BLOOD BY AUTOMATED COUNT: 15.6 % (ref 11.5–14.5)
FLUAV RNA RESP QL NAA+PROBE: NOT DETECTED
FLUBV RNA RESP QL NAA+PROBE: NOT DETECTED
GFR SERPL CREATININE-BSD FRML MDRD: 51 ML/MIN/1.73M*2
GLUCOSE SERPL-MCNC: 95 MG/DL (ref 74–99)
HCT VFR BLD AUTO: 41.4 % (ref 41–52)
HGB BLD-MCNC: 13.2 G/DL (ref 13.5–17.5)
IMM GRANULOCYTES # BLD AUTO: 0.06 X10*3/UL (ref 0–0.5)
IMM GRANULOCYTES NFR BLD AUTO: 0.5 % (ref 0–0.9)
INR PPP: 4.4 (ref 0.9–1.1)
LYMPHOCYTES # BLD AUTO: 0.61 X10*3/UL (ref 0.8–3)
LYMPHOCYTES NFR BLD AUTO: 5.3 %
MAGNESIUM SERPL-MCNC: 1.94 MG/DL (ref 1.6–2.4)
MCH RBC QN AUTO: 28.6 PG (ref 26–34)
MCHC RBC AUTO-ENTMCNC: 31.9 G/DL (ref 32–36)
MCV RBC AUTO: 90 FL (ref 80–100)
MONOCYTES # BLD AUTO: 1.09 X10*3/UL (ref 0.05–0.8)
MONOCYTES NFR BLD AUTO: 9.5 %
NEUTROPHILS # BLD AUTO: 9.64 X10*3/UL (ref 1.6–5.5)
NEUTROPHILS NFR BLD AUTO: 83.7 %
NRBC BLD-RTO: 0 /100 WBCS (ref 0–0)
PLATELET # BLD AUTO: 218 X10*3/UL (ref 150–450)
POTASSIUM SERPL-SCNC: 4.5 MMOL/L (ref 3.5–5.3)
PROT SERPL-MCNC: 7.1 G/DL (ref 6.4–8.2)
PROTHROMBIN TIME: 50.3 SECONDS (ref 9.8–12.8)
RBC # BLD AUTO: 4.61 X10*6/UL (ref 4.5–5.9)
SARS-COV-2 RNA RESP QL NAA+PROBE: DETECTED
SODIUM SERPL-SCNC: 132 MMOL/L (ref 136–145)
WBC # BLD AUTO: 11.5 X10*3/UL (ref 4.4–11.3)

## 2023-12-10 PROCEDURE — 1100000001 HC PRIVATE ROOM DAILY

## 2023-12-10 PROCEDURE — 99223 1ST HOSP IP/OBS HIGH 75: CPT | Performed by: STUDENT IN AN ORGANIZED HEALTH CARE EDUCATION/TRAINING PROGRAM

## 2023-12-10 PROCEDURE — 71045 X-RAY EXAM CHEST 1 VIEW: CPT | Mod: FY

## 2023-12-10 PROCEDURE — 2500000001 HC RX 250 WO HCPCS SELF ADMINISTERED DRUGS (ALT 637 FOR MEDICARE OP): Performed by: STUDENT IN AN ORGANIZED HEALTH CARE EDUCATION/TRAINING PROGRAM

## 2023-12-10 PROCEDURE — 83735 ASSAY OF MAGNESIUM: CPT | Performed by: EMERGENCY MEDICINE

## 2023-12-10 PROCEDURE — 2500000004 HC RX 250 GENERAL PHARMACY W/ HCPCS (ALT 636 FOR OP/ED): Performed by: EMERGENCY MEDICINE

## 2023-12-10 PROCEDURE — 71250 CT THORAX DX C-: CPT | Mod: FOREIGN READ | Performed by: RADIOLOGY

## 2023-12-10 PROCEDURE — 84484 ASSAY OF TROPONIN QUANT: CPT | Performed by: EMERGENCY MEDICINE

## 2023-12-10 PROCEDURE — 96375 TX/PRO/DX INJ NEW DRUG ADDON: CPT

## 2023-12-10 PROCEDURE — 87636 SARSCOV2 & INF A&B AMP PRB: CPT | Performed by: EMERGENCY MEDICINE

## 2023-12-10 PROCEDURE — 71045 X-RAY EXAM CHEST 1 VIEW: CPT | Mod: FOREIGN READ | Performed by: RADIOLOGY

## 2023-12-10 PROCEDURE — 36415 COLL VENOUS BLD VENIPUNCTURE: CPT | Performed by: EMERGENCY MEDICINE

## 2023-12-10 PROCEDURE — 3E0333Z INTRODUCTION OF ANTI-INFLAMMATORY INTO PERIPHERAL VEIN, PERCUTANEOUS APPROACH: ICD-10-PCS | Performed by: STUDENT IN AN ORGANIZED HEALTH CARE EDUCATION/TRAINING PROGRAM

## 2023-12-10 PROCEDURE — 85025 COMPLETE CBC W/AUTO DIFF WBC: CPT | Performed by: EMERGENCY MEDICINE

## 2023-12-10 PROCEDURE — 2500000002 HC RX 250 W HCPCS SELF ADMINISTERED DRUGS (ALT 637 FOR MEDICARE OP, ALT 636 FOR OP/ED): Performed by: EMERGENCY MEDICINE

## 2023-12-10 PROCEDURE — 85610 PROTHROMBIN TIME: CPT | Performed by: EMERGENCY MEDICINE

## 2023-12-10 PROCEDURE — 94760 N-INVAS EAR/PLS OXIMETRY 1: CPT

## 2023-12-10 PROCEDURE — 84145 PROCALCITONIN (PCT): CPT | Mod: ELYLAB | Performed by: STUDENT IN AN ORGANIZED HEALTH CARE EDUCATION/TRAINING PROGRAM

## 2023-12-10 PROCEDURE — 83880 ASSAY OF NATRIURETIC PEPTIDE: CPT | Performed by: EMERGENCY MEDICINE

## 2023-12-10 PROCEDURE — 2500000004 HC RX 250 GENERAL PHARMACY W/ HCPCS (ALT 636 FOR OP/ED): Performed by: STUDENT IN AN ORGANIZED HEALTH CARE EDUCATION/TRAINING PROGRAM

## 2023-12-10 PROCEDURE — 96374 THER/PROPH/DIAG INJ IV PUSH: CPT

## 2023-12-10 PROCEDURE — 93005 ELECTROCARDIOGRAM TRACING: CPT

## 2023-12-10 PROCEDURE — 2500000002 HC RX 250 W HCPCS SELF ADMINISTERED DRUGS (ALT 637 FOR MEDICARE OP, ALT 636 FOR OP/ED): Performed by: STUDENT IN AN ORGANIZED HEALTH CARE EDUCATION/TRAINING PROGRAM

## 2023-12-10 PROCEDURE — 80053 COMPREHEN METABOLIC PANEL: CPT | Performed by: EMERGENCY MEDICINE

## 2023-12-10 PROCEDURE — 2500000002 HC RX 250 W HCPCS SELF ADMINISTERED DRUGS (ALT 637 FOR MEDICARE OP, ALT 636 FOR OP/ED): Performed by: SPECIALIST

## 2023-12-10 PROCEDURE — 99291 CRITICAL CARE FIRST HOUR: CPT | Performed by: EMERGENCY MEDICINE

## 2023-12-10 PROCEDURE — 71250 CT THORAX DX C-: CPT | Mod: FR

## 2023-12-10 PROCEDURE — 94640 AIRWAY INHALATION TREATMENT: CPT

## 2023-12-10 RX ORDER — ACETAMINOPHEN 500 MG
5 TABLET ORAL NIGHTLY PRN
Status: DISCONTINUED | OUTPATIENT
Start: 2023-12-10 | End: 2023-12-15 | Stop reason: HOSPADM

## 2023-12-10 RX ORDER — FERROUS SULFATE 325(65) MG
65 TABLET ORAL
Status: DISCONTINUED | OUTPATIENT
Start: 2023-12-11 | End: 2023-12-15 | Stop reason: HOSPADM

## 2023-12-10 RX ORDER — ACETAMINOPHEN 650 MG/1
650 SUPPOSITORY RECTAL EVERY 4 HOURS PRN
Status: DISCONTINUED | OUTPATIENT
Start: 2023-12-10 | End: 2023-12-15 | Stop reason: HOSPADM

## 2023-12-10 RX ORDER — FUROSEMIDE 40 MG/1
20 TABLET ORAL DAILY
Status: DISCONTINUED | OUTPATIENT
Start: 2023-12-10 | End: 2023-12-15 | Stop reason: HOSPADM

## 2023-12-10 RX ORDER — ACETAMINOPHEN 325 MG/1
650 TABLET ORAL EVERY 4 HOURS PRN
Status: DISCONTINUED | OUTPATIENT
Start: 2023-12-10 | End: 2023-12-15 | Stop reason: HOSPADM

## 2023-12-10 RX ORDER — ACETAMINOPHEN 160 MG/5ML
650 SOLUTION ORAL EVERY 4 HOURS PRN
Status: DISCONTINUED | OUTPATIENT
Start: 2023-12-10 | End: 2023-12-15 | Stop reason: HOSPADM

## 2023-12-10 RX ORDER — IPRATROPIUM BROMIDE AND ALBUTEROL SULFATE 2.5; .5 MG/3ML; MG/3ML
3 SOLUTION RESPIRATORY (INHALATION)
Status: DISCONTINUED | OUTPATIENT
Start: 2023-12-10 | End: 2023-12-11

## 2023-12-10 RX ORDER — ONDANSETRON 4 MG/1
4 TABLET, FILM COATED ORAL EVERY 8 HOURS PRN
Status: DISCONTINUED | OUTPATIENT
Start: 2023-12-10 | End: 2023-12-15 | Stop reason: HOSPADM

## 2023-12-10 RX ORDER — POLYETHYLENE GLYCOL 3350 17 G/17G
17 POWDER, FOR SOLUTION ORAL DAILY
Status: DISCONTINUED | OUTPATIENT
Start: 2023-12-10 | End: 2023-12-15 | Stop reason: HOSPADM

## 2023-12-10 RX ORDER — GUAIFENESIN/DEXTROMETHORPHAN 100-10MG/5
5 SYRUP ORAL EVERY 4 HOURS PRN
Status: DISCONTINUED | OUTPATIENT
Start: 2023-12-10 | End: 2023-12-15 | Stop reason: HOSPADM

## 2023-12-10 RX ORDER — POTASSIUM CHLORIDE 750 MG/1
10 TABLET, FILM COATED, EXTENDED RELEASE ORAL DAILY
Status: DISCONTINUED | OUTPATIENT
Start: 2023-12-10 | End: 2023-12-15 | Stop reason: HOSPADM

## 2023-12-10 RX ORDER — DEXAMETHASONE SODIUM PHOSPHATE 10 MG/ML
6 INJECTION INTRAMUSCULAR; INTRAVENOUS ONCE
Status: COMPLETED | OUTPATIENT
Start: 2023-12-10 | End: 2023-12-10

## 2023-12-10 RX ORDER — ASCORBIC ACID 250 MG
1000 TABLET ORAL DAILY
Status: DISCONTINUED | OUTPATIENT
Start: 2023-12-10 | End: 2023-12-15 | Stop reason: HOSPADM

## 2023-12-10 RX ORDER — TRAZODONE HYDROCHLORIDE 50 MG/1
100 TABLET ORAL NIGHTLY
Status: DISCONTINUED | OUTPATIENT
Start: 2023-12-10 | End: 2023-12-15 | Stop reason: HOSPADM

## 2023-12-10 RX ORDER — ONDANSETRON HYDROCHLORIDE 2 MG/ML
4 INJECTION, SOLUTION INTRAVENOUS EVERY 8 HOURS PRN
Status: DISCONTINUED | OUTPATIENT
Start: 2023-12-10 | End: 2023-12-15 | Stop reason: HOSPADM

## 2023-12-10 RX ORDER — CEFTRIAXONE 1 G/50ML
1 INJECTION, SOLUTION INTRAVENOUS ONCE
Status: COMPLETED | OUTPATIENT
Start: 2023-12-10 | End: 2023-12-10

## 2023-12-10 RX ORDER — LEVOFLOXACIN 5 MG/ML
750 INJECTION, SOLUTION INTRAVENOUS
Status: DISCONTINUED | OUTPATIENT
Start: 2023-12-10 | End: 2023-12-14

## 2023-12-10 RX ORDER — ROSUVASTATIN CALCIUM 10 MG/1
10 TABLET, COATED ORAL DAILY
Status: DISCONTINUED | OUTPATIENT
Start: 2023-12-10 | End: 2023-12-15 | Stop reason: HOSPADM

## 2023-12-10 RX ORDER — IPRATROPIUM BROMIDE AND ALBUTEROL SULFATE 2.5; .5 MG/3ML; MG/3ML
3 SOLUTION RESPIRATORY (INHALATION) ONCE
Status: COMPLETED | OUTPATIENT
Start: 2023-12-10 | End: 2023-12-10

## 2023-12-10 RX ORDER — NITROGLYCERIN 0.4 MG/1
0.4 TABLET SUBLINGUAL EVERY 5 MIN PRN
Status: DISCONTINUED | OUTPATIENT
Start: 2023-12-10 | End: 2023-12-15 | Stop reason: HOSPADM

## 2023-12-10 RX ORDER — METOPROLOL TARTRATE 25 MG/1
25 TABLET, FILM COATED ORAL 2 TIMES DAILY
Status: DISCONTINUED | OUTPATIENT
Start: 2023-12-10 | End: 2023-12-15 | Stop reason: HOSPADM

## 2023-12-10 RX ORDER — CARBOXYMETHYLCELLULOSE SODIUM 10 MG/ML
1 GEL OPHTHALMIC 3 TIMES DAILY PRN
Status: DISCONTINUED | OUTPATIENT
Start: 2023-12-10 | End: 2023-12-10

## 2023-12-10 RX ORDER — FERROUS SULFATE 325(65) MG
65 TABLET, DELAYED RELEASE (ENTERIC COATED) ORAL
Status: DISCONTINUED | OUTPATIENT
Start: 2023-12-11 | End: 2023-12-10

## 2023-12-10 RX ADMIN — METHYLPREDNISOLONE SODIUM SUCCINATE 40 MG: 40 INJECTION, POWDER, FOR SOLUTION INTRAMUSCULAR; INTRAVENOUS at 20:09

## 2023-12-10 RX ADMIN — ACETAMINOPHEN 650 MG: 325 TABLET ORAL at 22:47

## 2023-12-10 RX ADMIN — METOPROLOL TARTRATE 25 MG: 25 TABLET, FILM COATED ORAL at 20:08

## 2023-12-10 RX ADMIN — IPRATROPIUM BROMIDE AND ALBUTEROL SULFATE 3 ML: 2.5; .5 SOLUTION RESPIRATORY (INHALATION) at 12:22

## 2023-12-10 RX ADMIN — CEFTRIAXONE SODIUM 1 G: 1 INJECTION, SOLUTION INTRAVENOUS at 17:21

## 2023-12-10 RX ADMIN — AZITHROMYCIN MONOHYDRATE 500 MG: 500 INJECTION, POWDER, LYOPHILIZED, FOR SOLUTION INTRAVENOUS at 17:21

## 2023-12-10 RX ADMIN — LEVOFLOXACIN 750 MG: 5 INJECTION, SOLUTION INTRAVENOUS at 20:08

## 2023-12-10 RX ADMIN — IPRATROPIUM BROMIDE AND ALBUTEROL SULFATE 3 ML: .5; 3 SOLUTION RESPIRATORY (INHALATION) at 20:13

## 2023-12-10 RX ADMIN — POLYETHYLENE GLYCOL 3350 17 G: 17 POWDER, FOR SOLUTION ORAL at 22:47

## 2023-12-10 RX ADMIN — IPRATROPIUM BROMIDE AND ALBUTEROL SULFATE 3 ML: 2.5; .5 SOLUTION RESPIRATORY (INHALATION) at 19:56

## 2023-12-10 RX ADMIN — ROSUVASTATIN CALCIUM 10 MG: 10 TABLET, FILM COATED ORAL at 22:47

## 2023-12-10 RX ADMIN — TRAZODONE HYDROCHLORIDE 100 MG: 50 TABLET ORAL at 20:09

## 2023-12-10 RX ADMIN — DEXAMETHASONE SODIUM PHOSPHATE 6 MG: 10 INJECTION, SOLUTION INTRAMUSCULAR; INTRAVENOUS at 14:54

## 2023-12-10 SDOH — HEALTH STABILITY: PHYSICAL HEALTH: ON AVERAGE, HOW MANY MINUTES DO YOU ENGAGE IN EXERCISE AT THIS LEVEL?: 0 MIN

## 2023-12-10 SDOH — SOCIAL STABILITY: SOCIAL INSECURITY: DO YOU FEEL UNSAFE GOING BACK TO THE PLACE WHERE YOU ARE LIVING?: NO

## 2023-12-10 SDOH — ECONOMIC STABILITY: INCOME INSECURITY: IN THE PAST 12 MONTHS, HAS THE ELECTRIC, GAS, OIL, OR WATER COMPANY THREATENED TO SHUT OFF SERVICE IN YOUR HOME?: NO

## 2023-12-10 SDOH — SOCIAL STABILITY: SOCIAL INSECURITY: HAVE YOU HAD THOUGHTS OF HARMING ANYONE ELSE?: NO

## 2023-12-10 SDOH — HEALTH STABILITY: PHYSICAL HEALTH: ON AVERAGE, HOW MANY DAYS PER WEEK DO YOU ENGAGE IN MODERATE TO STRENUOUS EXERCISE (LIKE A BRISK WALK)?: 0 DAYS

## 2023-12-10 SDOH — SOCIAL STABILITY: SOCIAL INSECURITY: DOES ANYONE TRY TO KEEP YOU FROM HAVING/CONTACTING OTHER FRIENDS OR DOING THINGS OUTSIDE YOUR HOME?: NO

## 2023-12-10 SDOH — SOCIAL STABILITY: SOCIAL INSECURITY: ARE THERE ANY APPARENT SIGNS OF INJURIES/BEHAVIORS THAT COULD BE RELATED TO ABUSE/NEGLECT?: NO

## 2023-12-10 SDOH — SOCIAL STABILITY: SOCIAL INSECURITY: DO YOU FEEL ANYONE HAS EXPLOITED OR TAKEN ADVANTAGE OF YOU FINANCIALLY OR OF YOUR PERSONAL PROPERTY?: NO

## 2023-12-10 SDOH — SOCIAL STABILITY: SOCIAL INSECURITY: ARE YOU OR HAVE YOU BEEN THREATENED OR ABUSED PHYSICALLY, EMOTIONALLY, OR SEXUALLY BY ANYONE?: NO

## 2023-12-10 SDOH — SOCIAL STABILITY: SOCIAL INSECURITY: ABUSE: ADULT

## 2023-12-10 SDOH — SOCIAL STABILITY: SOCIAL INSECURITY: HAS ANYONE EVER THREATENED TO HURT YOUR FAMILY OR YOUR PETS?: NO

## 2023-12-10 ASSESSMENT — PATIENT HEALTH QUESTIONNAIRE - PHQ9
2. FEELING DOWN, DEPRESSED OR HOPELESS: SEVERAL DAYS
1. LITTLE INTEREST OR PLEASURE IN DOING THINGS: SEVERAL DAYS
SUM OF ALL RESPONSES TO PHQ9 QUESTIONS 1 & 2: 2

## 2023-12-10 ASSESSMENT — ENCOUNTER SYMPTOMS
WEAKNESS: 1
SHORTNESS OF BREATH: 1
FATIGUE: 1
SLEEP DISTURBANCE: 1
EYES NEGATIVE: 1
ARTHRALGIAS: 1
VOMITING: 1
JOINT SWELLING: 1
APPETITE CHANGE: 1
NAUSEA: 1
MYALGIAS: 1
WHEEZING: 1
COUGH: 1
ALLERGIC/IMMUNOLOGIC NEGATIVE: 1
ENDOCRINE NEGATIVE: 1

## 2023-12-10 ASSESSMENT — PAIN SCALES - GENERAL
PAINLEVEL_OUTOF10: 0 - NO PAIN
PAINLEVEL_OUTOF10: 6

## 2023-12-10 ASSESSMENT — COLUMBIA-SUICIDE SEVERITY RATING SCALE - C-SSRS
1. IN THE PAST MONTH, HAVE YOU WISHED YOU WERE DEAD OR WISHED YOU COULD GO TO SLEEP AND NOT WAKE UP?: NO
6. HAVE YOU EVER DONE ANYTHING, STARTED TO DO ANYTHING, OR PREPARED TO DO ANYTHING TO END YOUR LIFE?: NO
2. HAVE YOU ACTUALLY HAD ANY THOUGHTS OF KILLING YOURSELF?: NO

## 2023-12-10 ASSESSMENT — ACTIVITIES OF DAILY LIVING (ADL)
PATIENT'S MEMORY ADEQUATE TO SAFELY COMPLETE DAILY ACTIVITIES?: YES
WALKS IN HOME: DEPENDENT
ADEQUATE_TO_COMPLETE_ADL: YES
JUDGMENT_ADEQUATE_SAFELY_COMPLETE_DAILY_ACTIVITIES: YES
HEARING - LEFT EAR: HEARING AID
GROOMING: NEEDS ASSISTANCE
TOILETING: DEPENDENT
LACK_OF_TRANSPORTATION: NO
BATHING: DEPENDENT
HEARING - RIGHT EAR: HEARING AID
DRESSING YOURSELF: DEPENDENT
FEEDING YOURSELF: INDEPENDENT
ASSISTIVE_DEVICE: WALKER

## 2023-12-10 ASSESSMENT — COGNITIVE AND FUNCTIONAL STATUS - GENERAL
PATIENT BASELINE BEDBOUND: NO
CLIMB 3 TO 5 STEPS WITH RAILING: A LOT
TURNING FROM BACK TO SIDE WHILE IN FLAT BAD: A LOT
WALKING IN HOSPITAL ROOM: A LOT
MOVING TO AND FROM BED TO CHAIR: A LOT
DRESSING REGULAR LOWER BODY CLOTHING: A LOT
MOVING FROM LYING ON BACK TO SITTING ON SIDE OF FLAT BED WITH BEDRAILS: A LOT
HELP NEEDED FOR BATHING: A LOT
DAILY ACTIVITIY SCORE: 16
MOBILITY SCORE: 12
DRESSING REGULAR UPPER BODY CLOTHING: A LOT
STANDING UP FROM CHAIR USING ARMS: A LOT
TOILETING: A LOT

## 2023-12-10 ASSESSMENT — PAIN - FUNCTIONAL ASSESSMENT: PAIN_FUNCTIONAL_ASSESSMENT: 0-10

## 2023-12-10 ASSESSMENT — LIFESTYLE VARIABLES
HAVE YOU EVER FELT YOU SHOULD CUT DOWN ON YOUR DRINKING: NO
REASON UNABLE TO ASSESS: NO
AUDIT-C TOTAL SCORE: 0
SUBSTANCE_ABUSE_PAST_12_MONTHS: NO
HAVE PEOPLE ANNOYED YOU BY CRITICIZING YOUR DRINKING: NO
HOW OFTEN DO YOU HAVE 6 OR MORE DRINKS ON ONE OCCASION: NEVER
PRESCIPTION_ABUSE_PAST_12_MONTHS: NO
HOW MANY STANDARD DRINKS CONTAINING ALCOHOL DO YOU HAVE ON A TYPICAL DAY: PATIENT DOES NOT DRINK
EVER FELT BAD OR GUILTY ABOUT YOUR DRINKING: NO
AUDIT-C TOTAL SCORE: 0
HOW OFTEN DO YOU HAVE A DRINK CONTAINING ALCOHOL: NEVER
EVER HAD A DRINK FIRST THING IN THE MORNING TO STEADY YOUR NERVES TO GET RID OF A HANGOVER: NO
SKIP TO QUESTIONS 9-10: 1

## 2023-12-10 NOTE — H&P
History Of Present Illness  Vinicius Arriola is a 88 y.o. male presenting with  Who presented to the ER with chief complaint of shortness of breath.  He has been complaining of progressive worsening shortness of breath over the past 2 days with minimally productive cough.  Also have some vomiting after coughing.  Wife put him on CPAP which did help his symptoms.  Denies any fevers, chills, chest pain, no abdominal pain, constipation, dysuria.  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  He does not usually wear oxygen at home, denies any illicit drug use or regular alcohol use.  On arrival to the ER temperature was 36.3, heart rate 84, respiratory 22, blood pressure 137/67 and he was saturating 100% on nonrebreather.  Blood work showed a creatinine of 1.36, sodium of 132, INR was high at 4.4, CBC showed white cell count of 11.5, hemoglobin of 13.2, normal platelet count, COVID was positive.  CT chest without contrast showed chronic consolidative changes throughout the left upper lobe and left lower lobe representing mesothelioma with slight enhancement..     Past Medical History  Past Medical History:   Diagnosis Date    Acute embolism and thrombosis of unspecified deep veins of unspecified lower extremity (CMS/HCC)     Acute embolism and thrombosis of unspecified deep veins of unspecified lower extremity    Dental disease     Upper and lower dentures    Encounter for preprocedural cardiovascular examination 11/02/2021    Pre-operative cardiovascular examination    Obesity, unspecified 07/15/2022    Class 2 obesity with body mass index (BMI) of 38.0 to 38.9 in adult    Personal history of diseases of the blood and blood-forming organs and certain disorders involving the immune mechanism     History of polycythemia    Personal history of other diseases of male genital organs     History of benign prostatic hyperplasia    Personal history of other diseases of the circulatory system     History of hypertension    Personal  history of other diseases of the circulatory system     History of cardiac arrhythmia    Personal history of other diseases of the circulatory system 10/21/2021    History of abnormal electrocardiography    Personal history of other diseases of the circulatory system     History of essential hypertension    Personal history of other diseases of the nervous system and sense organs     History of sleep apnea    Personal history of other malignant neoplasm of skin     History of malignant neoplasm of skin    Personal history of other specified conditions     History of balance disorder    Personal history of other venous thrombosis and embolism     History of deep venous thrombosis     Mestholiama, pv  Surgical History  Past Surgical History:   Procedure Laterality Date    OTHER SURGICAL HISTORY  08/20/2019    Hernia repair    OTHER SURGICAL HISTORY  08/20/2019    Tonsillectomy with adenoidectomy    OTHER SURGICAL HISTORY  08/20/2019    Cataract surgery    OTHER SURGICAL HISTORY  06/10/2022    Pulmonary decortication    OTHER SURGICAL HISTORY  11/02/2021    Knee replacement    OTHER SURGICAL HISTORY  01/02/2020    Cardioversion    OTHER SURGICAL HISTORY  01/02/2020    Finger surgical procedure    OTHER SURGICAL HISTORY  05/13/2022    Cardiac catheterization with stent placement    OTHER SURGICAL HISTORY  05/26/2022    Pulmonary decortication    OTHER SURGICAL HISTORY  10/21/2021    Back surgery    OTHER SURGICAL HISTORY  11/02/2021    Colonoscopy    OTHER SURGICAL HISTORY  11/02/2021    Inguinal hernia repair    OTHER SURGICAL HISTORY  11/02/2021    Trigger finger repair    OTHER SURGICAL HISTORY  11/02/2021    Umbilical hernia repair    OTHER SURGICAL HISTORY  11/04/2021    Carpal tunnel surgery        Social History  He reports that he quit smoking about 56 years ago. His smoking use included cigarettes. He started smoking about 66 years ago. He smoked an average of 1 pack per day. He has never used smokeless  "tobacco. He reports current alcohol use of about 4.0 standard drinks of alcohol per week. He reports that he does not use drugs.    Family History  Family History   Problem Relation Name Age of Onset    Accidental death Mother      Coronary artery disease Mother      Other (Cardiac disorder) Father      Dementia Father      Cancer Father      Colon cancer Daughter          Allergies  Benadryl allergy decongestant and Diphenhydramine hcl    Review of Systems  As per HPI, comprehensive review of systems performed  Physical Exam  Constitutional:       General: He is not in acute distress.     Appearance: He is obese. He is not toxic-appearing.   HENT:      Head: Normocephalic and atraumatic.   Eyes:      Extraocular Movements: Extraocular movements intact.      Conjunctiva/sclera: Conjunctivae normal.   Cardiovascular:      Rate and Rhythm: Normal rate and regular rhythm.      Pulses: Normal pulses.   Pulmonary:      Comments: Decreased breathing sounds bilaterally, on nasal cannula oxygen  Abdominal:      General: There is distension.      Palpations: Abdomen is soft.      Tenderness: There is no abdominal tenderness.   Musculoskeletal:         General: Deformity present.      Cervical back: No rigidity or tenderness.   Neurological:      General: No focal deficit present.      Mental Status: He is alert.   Psychiatric:         Mood and Affect: Mood normal.          Last Recorded Vitals  Blood pressure 142/66, pulse 89, temperature 36.3 °C (97.3 °F), temperature source Temporal, resp. rate 18, height 1.727 m (5' 8\"), weight 116 kg (255 lb), SpO2 96 %.    Relevant Results         Assessment and plan    88-year-old male with past medical history of mesothelioma, sleep apnea, obesity, CKD admitted with acute hypoxemic respiratory failure most likely secondary to progression of mesothelioma, possible pneumonia, elevated creatinine, hyponatremia with positive COVID    I do not believe his hypoxemic respiratory failure has " anything to do with COVID I believe this most likely progression of mesothelioma with superimposed pneumonia  Will check a procalcitonin level, start him on Levaquin for now  DuoNebs, IV steroids  Respiratory support as needed  Consult pulmonology  Supportive care, symptomatic treatment  CPAP at night  Creatinine seems to be at baseline we will continue home dose Lasix  Sodium is borderline low, continue to monitor  Monitor leukocytosis  Not a candidate for remdesivir  Contact isolation hold Coumadin, INR is supratherapeutic, do daily INR, restart when INR between 2 and 3  Continue regular medications including metoprolol, iron supplementation  DVT prophylaxis addressed        Abraham Pugh MD

## 2023-12-10 NOTE — ED NOTES
Shortness of breath and difficulty breathing continued at this time. Oxygen saturation dropped to 84% with room air trial. Oxygen administered at 3L per nasal cannula and improvement noted. Patient coached on pursed lip breathing and improvement noted at 99%- PRINCE Prince AMBER Choudhary RN  12/10/23 2509

## 2023-12-10 NOTE — CONSULTS
Reason For Consult  Short of breath    History Of Present Illness  Vinicius Arriola is a 88 y.o. male presenting with Patient family called from home patient O2 saturation dropped to 84% on room air and was not able to come off the BiPAP without being more short of breath.  Patient has history of mesothelioma..     Past Medical History  He has a past medical history of Acute embolism and thrombosis of unspecified deep veins of unspecified lower extremity (CMS/HCC), Dental disease, Encounter for preprocedural cardiovascular examination (11/02/2021), Obesity, unspecified (07/15/2022), Personal history of diseases of the blood and blood-forming organs and certain disorders involving the immune mechanism, Personal history of other diseases of male genital organs, Personal history of other diseases of the circulatory system, Personal history of other diseases of the circulatory system, Personal history of other diseases of the circulatory system (10/21/2021), Personal history of other diseases of the circulatory system, Personal history of other diseases of the nervous system and sense organs, Personal history of other malignant neoplasm of skin, Personal history of other specified conditions, and Personal history of other venous thrombosis and embolism.  History of mesothelioma.  S/p left VATS and chest tube placement and pleurodesis.  S/p radiation therapy permitted daily management.  Surgical History  He has a past surgical history that includes Other surgical history (08/20/2019); Other surgical history (08/20/2019); Other surgical history (08/20/2019); Other surgical history (06/10/2022); Other surgical history (11/02/2021); Other surgical history (01/02/2020); Other surgical history (01/02/2020); Other surgical history (05/13/2022); Other surgical history (05/26/2022); Other surgical history (10/21/2021); Other surgical history (11/02/2021); Other surgical history (11/02/2021); Other surgical history (11/02/2021);  Other surgical history (11/02/2021); and Other surgical history (11/04/2021).     Social History  He reports that he quit smoking about 56 years ago. His smoking use included cigarettes. He started smoking about 66 years ago. He smoked an average of 1 pack per day. He has never used smokeless tobacco. He reports current alcohol use of about 4.0 standard drinks of alcohol per week. He reports that he does not use drugs.    Family History  Family History   Problem Relation Name Age of Onset    Accidental death Mother      Coronary artery disease Mother      Other (Cardiac disorder) Father      Dementia Father      Cancer Father      Colon cancer Daughter          Allergies  Benadryl allergy decongestant and Diphenhydramine hcl    Review of Systems  Review of Systems   Constitutional:  Positive for appetite change and fatigue.   HENT:  Positive for congestion.    Eyes: Negative.    Respiratory:  Positive for cough, shortness of breath and wheezing.    Gastrointestinal:  Positive for nausea and vomiting.   Endocrine: Negative.    Genitourinary: Negative.    Musculoskeletal:  Positive for arthralgias, joint swelling and myalgias.   Skin: Negative.    Allergic/Immunologic: Negative.    Neurological:  Positive for weakness.   Psychiatric/Behavioral:  Positive for sleep disturbance.    All other systems reviewed and are negative.        Physical Exam  Physical Exam  Vitals reviewed.   Constitutional:       Appearance: Normal appearance. He is obese.   HENT:      Head: Normocephalic and atraumatic.      Right Ear: Tympanic membrane and external ear normal.      Left Ear: Tympanic membrane and external ear normal.      Nose: Congestion present.      Mouth/Throat:      Mouth: Mucous membranes are dry.   Eyes:      Extraocular Movements: Extraocular movements intact.      Pupils: Pupils are equal, round, and reactive to light.   Cardiovascular:      Rate and Rhythm: Normal rate.      Pulses: Normal pulses.      Heart sounds:  "Normal heart sounds.   Pulmonary:      Breath sounds: Wheezing and rales present.   Abdominal:      Palpations: Abdomen is soft.   Musculoskeletal:         General: Normal range of motion.      Cervical back: Normal range of motion.   Skin:     General: Skin is dry.      Capillary Refill: Capillary refill takes 2 to 3 seconds.   Neurological:      General: No focal deficit present.      Mental Status: He is alert and oriented to person, place, and time.   Psychiatric:         Mood and Affect: Mood normal.         Behavior: Behavior normal.           Last Recorded Vitals  Blood pressure 142/66, pulse 89, temperature 36.3 °C (97.3 °F), temperature source Temporal, resp. rate 18, height 1.727 m (5' 8\"), weight 116 kg (255 lb), SpO2 96 %.    Relevant Results  Results for orders placed or performed during the hospital encounter of 12/10/23 (from the past 96 hour(s))   CBC and Auto Differential   Result Value Ref Range    WBC 11.5 (H) 4.4 - 11.3 x10*3/uL    nRBC 0.0 0.0 - 0.0 /100 WBCs    RBC 4.61 4.50 - 5.90 x10*6/uL    Hemoglobin 13.2 (L) 13.5 - 17.5 g/dL    Hematocrit 41.4 41.0 - 52.0 %    MCV 90 80 - 100 fL    MCH 28.6 26.0 - 34.0 pg    MCHC 31.9 (L) 32.0 - 36.0 g/dL    RDW 15.6 (H) 11.5 - 14.5 %    Platelets 218 150 - 450 x10*3/uL    Neutrophils % 83.7 40.0 - 80.0 %    Immature Granulocytes %, Automated 0.5 0.0 - 0.9 %    Lymphocytes % 5.3 13.0 - 44.0 %    Monocytes % 9.5 2.0 - 10.0 %    Eosinophils % 0.8 0.0 - 6.0 %    Basophils % 0.2 0.0 - 2.0 %    Neutrophils Absolute 9.64 (H) 1.60 - 5.50 x10*3/uL    Immature Granulocytes Absolute, Automated 0.06 0.00 - 0.50 x10*3/uL    Lymphocytes Absolute 0.61 (L) 0.80 - 3.00 x10*3/uL    Monocytes Absolute 1.09 (H) 0.05 - 0.80 x10*3/uL    Eosinophils Absolute 0.09 0.00 - 0.40 x10*3/uL    Basophils Absolute 0.02 0.00 - 0.10 x10*3/uL   Comprehensive Metabolic Panel   Result Value Ref Range    Glucose 95 74 - 99 mg/dL    Sodium 132 (L) 136 - 145 mmol/L    Potassium 4.5 3.5 - 5.3 " "mmol/L    Chloride 99 98 - 107 mmol/L    Bicarbonate 27 21 - 32 mmol/L    Anion Gap 11 10 - 20 mmol/L    Urea Nitrogen 19 6 - 23 mg/dL    Creatinine 1.36 (H) 0.50 - 1.30 mg/dL    eGFR 51 (L) >60 mL/min/1.73m*2    Calcium 9.3 8.6 - 10.3 mg/dL    Albumin 3.8 3.4 - 5.0 g/dL    Alkaline Phosphatase 63 33 - 136 U/L    Total Protein 7.1 6.4 - 8.2 g/dL    AST 15 9 - 39 U/L    Bilirubin, Total 0.4 0.0 - 1.2 mg/dL    ALT 11 10 - 52 U/L   Magnesium   Result Value Ref Range    Magnesium 1.94 1.60 - 2.40 mg/dL   Protime-INR   Result Value Ref Range    Protime 50.3 (H) 9.8 - 12.8 seconds    INR 4.4 (H) 0.9 - 1.1   B-Type Natriuretic Peptide   Result Value Ref Range     (H) 0 - 99 pg/mL   Troponin I, High Sensitivity, Initial   Result Value Ref Range    Troponin I, High Sensitivity 15 0 - 20 ng/L   Sars-CoV-2 and Influenza A/B PCR   Result Value Ref Range    Flu A Result Not Detected Not Detected    Flu B Result Not Detected Not Detected    Coronavirus 2019, PCR Detected (A) Not Detected   Troponin, High Sensitivity, 1 Hour   Result Value Ref Range    Troponin I, High Sensitivity 17 0 - 20 ng/L    CT chest wo IV contrast    Result Date: 12/10/2023  STUDY: CT Chest without IV Contrast; 12/10/2023, 2:54PM. INDICATION: Follow up to abnormal chest radiograph, \"Persistent dense consolidation throughout the left chest.  Mesothelioma. COMPARISON: Chest x-ray 12/10/2023 and 2/17/2023.  CT chest 3/21/2023 and 6/14/2023 ACCESSION NUMBER(S): [id number] ORDERING CLINICIAN: ELIEL MENDIOLA TECHNIQUE:  CT of the chest was performed without contrast.  Automated mA/kV exposure control was utilized and patient examination was performed in strict accordance with principles of ALARA. FINDINGS: MEDIASTINUM: Shift of the mediastinal structures towards the left hemithorax. Cardiac size is stable.  Pacemaker leads are present.  The unenhanced thoracic aorta shows no evidence of aneurysm.  Small mediastinal lymph nodes.  Largest lymph node is a " prevascular AP window lymph node on image 24 series 201 measuring 1.3 cm and unchanged. LUNGS/PLEURA: Very small left pleural effusion.  The airways are patent. The right lung is clear.  Diffuse airspace densities with air bronchograms left hemithorax. LYMPH NODES: As above. UPPER ABDOMEN: Cholelithiasis. BONES: There are no acute fractures.  No suspicious bony lesions.    There are chronic consolidative changes throughout the left upper lobe and left lower lobe.  Overall the consolidative changes have progressed slightly since CT chest 3/21/2023 and 6/14/2023.  There is chronic volume loss left hemithorax with shift of the mediastinal structures towards the left.  The patient has a history of mesothelioma.  The chronic consolidative changes throughout the left lung could be due to pneumonitis if the patient has had prior radiation therapy.  Infection/pneumonia cannot be completely excluded.  The right lung is clear. Mild mediastinal lymphadenopathy is stable. Cholelithiasis. Signed by Maicol Tinoco MD    XR chest 1 view    Result Date: 12/10/2023  STUDY: Chest Radiograph;  12/10/2023 12:18PM INDICATION: Shortness of breath. COMPARISON: XR Chest 02/17/2023 ACCESSION NUMBER(S): [id number] ORDERING CLINICIAN: ELIEL MENDIOLA TECHNIQUE:  Frontal chest (two images) was obtained at 12:17 hours. FINDINGS: Dense consolidation persists throughout the left chest. Right chest remains clear. Mediastinal structures are shifted to the left. Right pacemaker remains in place. There is no pneumothorax.    Persistent dense consolidation throughout the left chest. Remainder as above. Signed by Angel Rojas MD    ECG 12 lead (Clinic Performed)    Result Date: 12/6/2023  EKG performed today shows atrial paced rhythm rate of 66 bpm rotation 74 ms QT corrected 423 ms.  Rhythm strip shows the same pattern.       Current Facility-Administered Medications:     dextromethorphan-guaifenesin (Robitussin DM)  mg/5 mL oral liquid 5 mL, 5  mL, oral, q4h PRN, Abraham Pugh MD    oxygen (O2) therapy, , inhalation, Continuous PRN - O2/gases, Shaka Jones,     Current Outpatient Medications:     ascorbic acid (Vitamin C) 1,000 mg tablet, Take 1 tablet (1,000 mg) by mouth once daily., Disp: , Rfl:     carboxymethylcellulose (Refresh Celluvisc) 1 % ophthalmic solution dropperette, Administer 1 drop into affected eye(s) once daily in the morning., Disp: , Rfl:     ergocalciferol (Vitamin D-2) 1.25 MG (03334 UT) capsule, Take 1 capsule (1,250 mcg) by mouth 1 (one) time per week., Disp: , Rfl:     ferrous sulfate 325 (65 Fe) MG EC tablet, Take 65 mg by mouth 2 times a day with meals. Do not crush, chew, or split., Disp: , Rfl:     furosemide (Lasix) 20 mg tablet, Take 1 tablet (20 mg) by mouth once daily., Disp: , Rfl:     melatonin 5 mg tablet, Take 1 tablet (5 mg) by mouth as needed at bedtime., Disp: , Rfl:     metoprolol tartrate (Lopressor) 25 mg tablet, Take 1 tablet (25 mg) by mouth 2 times a day., Disp: , Rfl:     nitroglycerin (Nitrostat) 0.4 mg SL tablet, Place under the tongue every 5 minutes if needed for chest pain (up to 3 doses)., Disp: , Rfl:     omega-3 fatty acids-fish oil 360-1,200 mg capsule, Take 1 capsule (1,200 mg) by mouth twice a day., Disp: , Rfl:     potassium chloride CR 20 mEq ER tablet, Take 0.5 tablets (10 mEq) by mouth once daily., Disp: , Rfl:     rosuvastatin (Crestor) 10 mg tablet, Take 1 tablet (10 mg) by mouth once daily., Disp: , Rfl:     traZODone (Desyrel) 50 mg tablet, Take 2 tablets (100 mg) by mouth once daily at bedtime., Disp: , Rfl:     warfarin (Coumadin) 1 mg tablet, Take 1-2 tablets (1-2 mg) by mouth. Take 1-2 tablets daily or as directed per [facility] as directed per After Visit Summary., Disp: , Rfl:     warfarin (Coumadin) 4 mg tablet, TAKE AS DIRECTED Per [facility] Heart Protime Department, Disp: , Rfl:     ZINC ORAL, Take 1 tablet by mouth once daily., Disp: , Rfl:    Assessment/Plan    Acute hypoxic respiratory failure continue oxygen keeping saturation more than 90% patient on 3 L oxygen nasal cannula.  Patient O2 saturation on room air was 84% on arrival.  And at home as well.  History of mesothelioma s/p left VATS and pleurodesis and s/p radiation therapy for mesothelioma.  Mesothelioma getting worse for many months.  Patient and family aware and discussed with them.  Consider palliative care.  Case discussed with patient and wife.  And ER physician.  Hospitalist updated about this mesothelioma diagnosis.  Consider changing the CODE STATUS to DNR.  Left-sided consolidation and volume loss is secondary to mesothelioma, atelectasis, infiltrate cannot be ruled out agree with antibiotic.  Covid-19 infection.  COPD exacerbation in a former smoker.  Continue bronchodilator as ordered.  Sleep apnea patient on BiPAP at home.  Morbid obesity patient advised reduce weight by diet exercise.  Hypertension.  Dyslipidemia.  Coronary disease PTCA in the remote past.  History of pulmonary embolism and DVT in the past.  Paroxysmal atrial fibrillation history.  Sick sinus syndrome and pacemaker placement.  Chronic kidney disease.  BPH.  EtOH counseling done.  DVT prophylaxis.  PT OT.  P.o. diet.      Luis Villasenor MD

## 2023-12-10 NOTE — ED PROVIDER NOTES
88-year-old male presents emergency department with chief complaint of shortness of breath.  Patient reports he has had progressive worsening shortness of breath over the past 2 days.  He does report of a minimally productive cough.  He states he did have some posttussive emesis today.  Patient reports his breathing was getting difficult and his wife put his CPAP on him which she usually only uses at night.  He does report this helped his symptoms.  He denies any fevers.  He denies chest pain or pressure.  No abdominal pain, nausea, dysuria, constipation, and black or bloody stools.  Patient does report history of mesothelioma.  Denies any illicit drug use or regular alcohol use. Patient states he does not usually wear oxygen at home.  Chart review shows history of arthritis, atrial fibrillation on Coumadin, hypertension, CAD, mesothelioma, hyperlipidemia, pacemaker, sleep apnea, polycythemia vera, elevated BMI, and previous tobacco use.  Family patient's wife and daughter arrived shortly after him and reported that they reached out to the patient's lung doctor Dr. Villasenor regarding his symptoms and they advised that he come to the emergency department.      History provided by:  Patient   used: No           ------------------------------------------------------------------------------------------------------------------------------------------    VS: As documented in the triage note and EMR flowsheet from this visit were reviewed.    Review of Systems  Constitutional: no fever, chills reports malaise and fatigue  Eyes: no redness, discharge, pain  HENT: no sore throat, nose bleeds, congestion, rhinorrhea   Cardiovascular: no chest pain, leg edema, palpitations  Respiratory: Admits to shortness of breath and cough  GI: Reports diarrhea and posttussive emesis no nausea, pain, constipation, BRBPR, melena  : no dysuria, frequency, hematuria  Musculoskeletal: no neck pain, stiffness,  no joint  deformity, swelling  Skin: no rash, erythema, wounds  Neurological: no headache, dizziness, lightheadedness, weakness, numbness, or tingling  Psychiatric: no suicidal thoughts, confusion, agitation  Metabolic: no polyuria or polydipsia  Hematologic: no increased bleeding or bruising  Immunology: No immunocompromise state    Replaced by [facility]  Nursing notes reviewed and confirmed by me.  Chart review performed including medications, allergies, and medical, surgical, and family history  Visit Vitals  /66   Pulse 65   Temp 36.3 °C (97.3 °F) (Temporal)   Resp 18     Physical Exam  Vitals and nursing note reviewed.   Constitutional:       General: He is not in acute distress.     Appearance: Normal appearance. He is not ill-appearing.   HENT:      Head: Normocephalic and atraumatic.      Right Ear: External ear normal.      Left Ear: External ear normal.      Nose: Nose normal. No congestion or rhinorrhea.      Mouth/Throat:      Mouth: Mucous membranes are moist.      Pharynx: No oropharyngeal exudate or posterior oropharyngeal erythema.   Eyes:      Extraocular Movements: Extraocular movements intact.      Conjunctiva/sclera: Conjunctivae normal.      Pupils: Pupils are equal, round, and reactive to light.   Cardiovascular:      Rate and Rhythm: Normal rate and regular rhythm.      Pulses: Normal pulses.      Heart sounds: Normal heart sounds.   Pulmonary:      Effort: Pulmonary effort is normal. No respiratory distress.      Breath sounds: No stridor. Rhonchi and rales present. No wheezing.      Comments: Patient has dry cough on my exam.  He has diminished breath sounds bilaterally, but worse on the left when compared to the right.  Patient does have rhonchi throughout lung fields with crackles.  Abdominal:      Palpations: Abdomen is soft.      Tenderness: There is no abdominal tenderness. There is no guarding or rebound.      Comments: Abdomen is protuberant   Musculoskeletal:         General: No swelling or deformity.  Normal range of motion.      Cervical back: Normal range of motion and neck supple. No rigidity.      Comments: No calf tenderness   Trace lower extremity edema bilaterally.   Skin:     General: Skin is warm and dry.      Capillary Refill: Capillary refill takes less than 2 seconds.      Coloration: Skin is not jaundiced.      Findings: No rash.   Neurological:      General: No focal deficit present.      Mental Status: He is alert and oriented to person, place, and time.      Sensory: No sensory deficit.      Motor: No weakness.   Psychiatric:         Mood and Affect: Mood normal.         Behavior: Behavior normal.        Past Medical History:   Diagnosis Date    Acute embolism and thrombosis of unspecified deep veins of unspecified lower extremity (CMS/HCC)     Acute embolism and thrombosis of unspecified deep veins of unspecified lower extremity    Dental disease     Upper and lower dentures    Encounter for preprocedural cardiovascular examination 11/02/2021    Pre-operative cardiovascular examination    Obesity, unspecified 07/15/2022    Class 2 obesity with body mass index (BMI) of 38.0 to 38.9 in adult    Personal history of diseases of the blood and blood-forming organs and certain disorders involving the immune mechanism     History of polycythemia    Personal history of other diseases of male genital organs     History of benign prostatic hyperplasia    Personal history of other diseases of the circulatory system     History of hypertension    Personal history of other diseases of the circulatory system     History of cardiac arrhythmia    Personal history of other diseases of the circulatory system 10/21/2021    History of abnormal electrocardiography    Personal history of other diseases of the circulatory system     History of essential hypertension    Personal history of other diseases of the nervous system and sense organs     History of sleep apnea    Personal history of other malignant neoplasm of  skin     History of malignant neoplasm of skin    Personal history of other specified conditions     History of balance disorder    Personal history of other venous thrombosis and embolism     History of deep venous thrombosis      Past Surgical History:   Procedure Laterality Date    OTHER SURGICAL HISTORY  2019    Hernia repair    OTHER SURGICAL HISTORY  2019    Tonsillectomy with adenoidectomy    OTHER SURGICAL HISTORY  2019    Cataract surgery    OTHER SURGICAL HISTORY  06/10/2022    Pulmonary decortication    OTHER SURGICAL HISTORY  2021    Knee replacement    OTHER SURGICAL HISTORY  2020    Cardioversion    OTHER SURGICAL HISTORY  2020    Finger surgical procedure    OTHER SURGICAL HISTORY  2022    Cardiac catheterization with stent placement    OTHER SURGICAL HISTORY  2022    Pulmonary decortication    OTHER SURGICAL HISTORY  10/21/2021    Back surgery    OTHER SURGICAL HISTORY  2021    Colonoscopy    OTHER SURGICAL HISTORY  2021    Inguinal hernia repair    OTHER SURGICAL HISTORY  2021    Trigger finger repair    OTHER SURGICAL HISTORY  2021    Umbilical hernia repair    OTHER SURGICAL HISTORY  2021    Carpal tunnel surgery      Social History     Socioeconomic History    Marital status:      Spouse name: Not on file    Number of children: Not on file    Years of education: Not on file    Highest education level: Not on file   Occupational History    Not on file   Tobacco Use    Smoking status: Former     Packs/day: 1     Types: Cigarettes     Start date:      Quit date:      Years since quittin.9    Smokeless tobacco: Never   Vaping Use    Vaping Use: Never used   Substance and Sexual Activity    Alcohol use: Yes     Alcohol/week: 4.0 standard drinks of alcohol     Types: 4 Cans of beer per week    Drug use: Never    Sexual activity: Defer   Other Topics Concern    Not on file   Social History Narrative    Not  on file     Social Determinants of Health     Financial Resource Strain: Not on file   Food Insecurity: Not on file   Transportation Needs: Not on file   Physical Activity: Not on file   Stress: Not on file   Social Connections: Not on file   Intimate Partner Violence: Not on file   Housing Stability: Not on file      ------------------------------------------------------------------------------------------------------------------------------------------  CT chest wo IV contrast   Final Result   There are chronic consolidative changes throughout the left upper lobe   and left lower lobe.  Overall the consolidative changes have   progressed slightly since CT chest 3/21/2023 and 6/14/2023.  There is   chronic volume loss left hemithorax with shift of the mediastinal   structures towards the left.  The patient has a history of   mesothelioma.  The chronic consolidative changes throughout the left   lung could be due to pneumonitis if the patient has had prior   radiation therapy.  Infection/pneumonia cannot be completely excluded.       The right lung is clear.   Mild mediastinal lymphadenopathy is stable.   Cholelithiasis.   Signed by Maicol Tinoco MD      XR chest 1 view   Final Result   Persistent dense consolidation throughout the left chest. Remainder as   above.   Signed by Angel Rojas MD         Labs Reviewed   CBC WITH AUTO DIFFERENTIAL - Abnormal       Result Value    WBC 11.5 (*)     nRBC 0.0      RBC 4.61      Hemoglobin 13.2 (*)     Hematocrit 41.4      MCV 90      MCH 28.6      MCHC 31.9 (*)     RDW 15.6 (*)     Platelets 218      Neutrophils % 83.7      Immature Granulocytes %, Automated 0.5      Lymphocytes % 5.3      Monocytes % 9.5      Eosinophils % 0.8      Basophils % 0.2      Neutrophils Absolute 9.64 (*)     Immature Granulocytes Absolute, Automated 0.06      Lymphocytes Absolute 0.61 (*)     Monocytes Absolute 1.09 (*)     Eosinophils Absolute 0.09      Basophils Absolute 0.02     COMPREHENSIVE  METABOLIC PANEL - Abnormal    Glucose 95      Sodium 132 (*)     Potassium 4.5      Chloride 99      Bicarbonate 27      Anion Gap 11      Urea Nitrogen 19      Creatinine 1.36 (*)     eGFR 51 (*)     Calcium 9.3      Albumin 3.8      Alkaline Phosphatase 63      Total Protein 7.1      AST 15      Bilirubin, Total 0.4      ALT 11     PROTIME-INR - Abnormal    Protime 50.3 (*)     INR 4.4 (*)    B-TYPE NATRIURETIC PEPTIDE - Abnormal     (*)     Narrative:        <100 pg/mL - Heart failure unlikely  100-299 pg/mL - Intermediate probability of acute heart                  failure exacerbation. Correlate with clinical                  context and patient history.    >=300 pg/mL - Heart Failure likely. Correlate with clinical                  context and patient history.    BNP testing is performed using different testing methodology at [facility] than at other [facility]. Direct result comparisons should only be made within the same method.      SARS-COV-2 AND INFLUENZA A/B PCR - Abnormal    Flu A Result Not Detected      Flu B Result Not Detected      Coronavirus 2019, PCR Detected (*)     Narrative:     This assay has received FDA Emergency Use Authorization (EUA) and  is only authorized for the duration of time that circumstances exist to justify the authorization of the emergency use of in vitro diagnostic tests for the detection of SARS-CoV-2 virus and/or diagnosis of COVID-19 infection under section 564(b)(1) of the Act, 21 U.S.C. 360bbb-3(b)(1). Testing for SARS-CoV-2 is only recommended for patients who meet current clinical and/or epidemiological criteria as defined by federal, state, or local public health directives. This assay is an in vitro diagnostic nucleic acid amplification test for the qualitative detection of SARS-CoV-2, Influenza A, and Influenza B from nasopharyngeal specimens and has been validated for use at [facility]. Negative results do not  preclude COVID-19 infections or Influenza A/B infections, and should not be used as the sole basis for diagnosis, treatment, or other management decisions. If Influenza A/B and RSV PCR results are negative, testing for Parainfluenza virus, Adenovirus and Metapneumovirus is routinely performed for [facility] pediatric oncology and intensive care inpatients, and is available on other patients by placing an add-on request.    MAGNESIUM - Normal    Magnesium 1.94     SERIAL TROPONIN-INITIAL - Normal    Troponin I, High Sensitivity 15      Narrative:     Less than 99th percentile of normal range cutoff-  Female and children under 18 years old <14 ng/L; Male <21 ng/L: Negative  Repeat testing should be performed if clinically indicated.     Female and children under 18 years old 14-50 ng/L; Male 21-50 ng/L:  Consistent with possible cardiac damage and possible increased clinical   risk. Serial measurements may help to assess extent of myocardial damage.     >50 ng/L: Consistent with cardiac damage, increased clinical risk and  myocardial infarction. Serial measurements may help assess extent of   myocardial damage.      NOTE: Children less than 1 year old may have higher baseline troponin   levels and results should be interpreted in conjunction with the overall   clinical context.     NOTE: Troponin I testing is performed using a different   testing methodology at [facility] than at other   [facility]. Direct result comparisons should only   be made within the same method.   SERIAL TROPONIN, 1 HOUR - Normal    Troponin I, High Sensitivity 17      Narrative:     Less than 99th percentile of normal range cutoff-  Female and children under 18 years old <14 ng/L; Male <21 ng/L: Negative  Repeat testing should be performed if clinically indicated.     Female and children under 18 years old 14-50 ng/L; Male 21-50 ng/L:  Consistent with possible cardiac damage and possible increased clinical   risk. Serial  measurements may help to assess extent of myocardial damage.     >50 ng/L: Consistent with cardiac damage, increased clinical risk and  myocardial infarction. Serial measurements may help assess extent of   myocardial damage.      NOTE: Children less than 1 year old may have higher baseline troponin   levels and results should be interpreted in conjunction with the overall   clinical context.     NOTE: Troponin I testing is performed using a different   testing methodology at [facility] than at other   [facility]. Direct result comparisons should only   be made within the same method.   TROPONIN SERIES- (INITIAL, 1 HR)    Narrative:     The following orders were created for panel order Troponin I Series, High Sensitivity (0, 1 HR).  Procedure                               Abnormality         Status                     ---------                               -----------         ------                     Troponin I, High Sensiti...[[id number]]  Normal              Final result               Troponin, High Sensitivi...[[id number]]  Normal              Final result                 Please view results for these tests on the individual orders.   PROTIME-INR   BASIC METABOLIC PANEL   CBC WITH AUTO DIFFERENTIAL   PROCALCITONIN        Medical Decision Making  EKG interpreted by ED physician: Atrial paced rhythm with prolonged AV conduction.  Rate of 87.  TN prolonged at 316.  QRS and QTc within normal range.  No significant ST elevations or depressions.  Q waves present in inferior leads may represent previous infarct.  Nonspecific T wave inversions noted in inferior leads.  Normal axis.    88-year-old male presents emergency department with chief complaint of shortness of breath over the past couple of days along with cough.  On my exam patient is noted to have significantly diminished breath sounds on the left when compared to the right.  He is also noted to be hypoxic on room air into the mid 80s  requiring nasal cannula oxygen.  Given his presenting complaints a thorough workup was obtained.  BNP is slightly elevated though nonspecific given the patient's chronic kidney disease.  CBC shows slight leukocytosis and mild anemia.  Patient does not have findings of severe sepsis or septic shock.  COVID testing is positive which could explain his symptoms.  Flu is negative.  Cardiac enzymes x 2 and EKG do not show findings of acute ACS or significant arrhythmia.  INR is supratherapeutic.  Blood clot is unlikely.  CMP is consistent with the patient's chronic renal disease.  Chest x-ray identifies consolidation throughout the left chest with some shifting of the mediastinum.  I reviewed previous chest x-rays and this is not significantly changed.  I did obtain CT without contrast for further evaluation.  CT without contrast shows chronic consolidative changes to the left lung that have slightly progressed from previous.,  But infection cannot be fully ruled out.  Patient was treated with ceftriaxone and azithromycin for antibiotics.  Given his hypoxic respiratory failure and chest imaging changes along with his COVID I do feel he would benefit from mission for further evaluation and treatment.  Discussed this with patient and family at bedside they are agreeable.  Case discussed with hospitalist on-call.  Patient was given Decadron for COVID with hypoxia.  I also discussed this case with Dr. Villasenor the patient's pulmonologist who advised patient come to the emergency department.  He is agreeable with workup thus far and need for admission.  Patient was also treated with a aerosol treatment upon his arrival without much improvement.       Diagnoses as of 12/10/23 1959   Acute respiratory failure with hypoxia (CMS/HCC)   COVID   Abnormal chest CT   Supratherapeutic INR      1. COVID        2. Acute respiratory failure with hypoxia (CMS/HCC)        3. Abnormal chest CT        4. Supratherapeutic INR           Critical  Care    Performed by: Shaka Jones DO  Authorized by: Shaka Jones DO    Critical care provider statement:     Critical care time (minutes):  32    Critical care time was exclusive of:  Separately billable procedures and treating other patients    Critical care was necessary to treat or prevent imminent or life-threatening deterioration of the following conditions:  Respiratory failure    Critical care was time spent personally by me on the following activities:  Development of treatment plan with patient or surrogate, discussions with consultants, evaluation of patient's response to treatment, examination of patient, re-evaluation of patient's condition, pulse oximetry, ordering and review of radiographic studies and ordering and review of laboratory studies       This note was dictated using dragon software and may contain errors related to dictation interpretation errors.      Shaka Jones DO  12/10/23 1959       Shaka Jones DO  12/10/23 2001

## 2023-12-11 LAB
ANION GAP SERPL CALC-SCNC: 11 MMOL/L (ref 10–20)
BASOPHILS # BLD AUTO: 0.01 X10*3/UL (ref 0–0.1)
BASOPHILS NFR BLD AUTO: 0.1 %
BUN SERPL-MCNC: 25 MG/DL (ref 6–23)
CALCIUM SERPL-MCNC: 9.1 MG/DL (ref 8.6–10.3)
CHLORIDE SERPL-SCNC: 101 MMOL/L (ref 98–107)
CO2 SERPL-SCNC: 26 MMOL/L (ref 21–32)
CREAT SERPL-MCNC: 1.44 MG/DL (ref 0.5–1.3)
EOSINOPHIL # BLD AUTO: 0 X10*3/UL (ref 0–0.4)
EOSINOPHIL NFR BLD AUTO: 0 %
ERYTHROCYTE [DISTWIDTH] IN BLOOD BY AUTOMATED COUNT: 15.6 % (ref 11.5–14.5)
GFR SERPL CREATININE-BSD FRML MDRD: 47 ML/MIN/1.73M*2
GLUCOSE SERPL-MCNC: 150 MG/DL (ref 74–99)
HCT VFR BLD AUTO: 39.2 % (ref 41–52)
HGB BLD-MCNC: 12.7 G/DL (ref 13.5–17.5)
HOLD SPECIMEN: NORMAL
IMM GRANULOCYTES # BLD AUTO: 0.05 X10*3/UL (ref 0–0.5)
IMM GRANULOCYTES NFR BLD AUTO: 0.5 % (ref 0–0.9)
INR PPP: 4.7 (ref 0.9–1.1)
LYMPHOCYTES # BLD AUTO: 0.57 X10*3/UL (ref 0.8–3)
LYMPHOCYTES NFR BLD AUTO: 5.5 %
MCH RBC QN AUTO: 28.3 PG (ref 26–34)
MCHC RBC AUTO-ENTMCNC: 32.4 G/DL (ref 32–36)
MCV RBC AUTO: 88 FL (ref 80–100)
MONOCYTES # BLD AUTO: 0.14 X10*3/UL (ref 0.05–0.8)
MONOCYTES NFR BLD AUTO: 1.4 %
NEUTROPHILS # BLD AUTO: 9.52 X10*3/UL (ref 1.6–5.5)
NEUTROPHILS NFR BLD AUTO: 92.5 %
NRBC BLD-RTO: 0 /100 WBCS (ref 0–0)
PLATELET # BLD AUTO: 215 X10*3/UL (ref 150–450)
POTASSIUM SERPL-SCNC: 4.7 MMOL/L (ref 3.5–5.3)
PROCALCITONIN SERPL-MCNC: 0.03 NG/ML
PROTHROMBIN TIME: 53.8 SECONDS (ref 9.8–12.8)
RBC # BLD AUTO: 4.48 X10*6/UL (ref 4.5–5.9)
SODIUM SERPL-SCNC: 133 MMOL/L (ref 136–145)
WBC # BLD AUTO: 10.3 X10*3/UL (ref 4.4–11.3)

## 2023-12-11 PROCEDURE — 1100000001 HC PRIVATE ROOM DAILY

## 2023-12-11 PROCEDURE — 94660 CPAP INITIATION&MGMT: CPT

## 2023-12-11 PROCEDURE — 85025 COMPLETE CBC W/AUTO DIFF WBC: CPT | Performed by: STUDENT IN AN ORGANIZED HEALTH CARE EDUCATION/TRAINING PROGRAM

## 2023-12-11 PROCEDURE — 99232 SBSQ HOSP IP/OBS MODERATE 35: CPT | Performed by: STUDENT IN AN ORGANIZED HEALTH CARE EDUCATION/TRAINING PROGRAM

## 2023-12-11 PROCEDURE — 85610 PROTHROMBIN TIME: CPT | Performed by: STUDENT IN AN ORGANIZED HEALTH CARE EDUCATION/TRAINING PROGRAM

## 2023-12-11 PROCEDURE — 2500000001 HC RX 250 WO HCPCS SELF ADMINISTERED DRUGS (ALT 637 FOR MEDICARE OP): Performed by: NURSE PRACTITIONER

## 2023-12-11 PROCEDURE — 80048 BASIC METABOLIC PNL TOTAL CA: CPT | Performed by: STUDENT IN AN ORGANIZED HEALTH CARE EDUCATION/TRAINING PROGRAM

## 2023-12-11 PROCEDURE — 36415 COLL VENOUS BLD VENIPUNCTURE: CPT | Performed by: STUDENT IN AN ORGANIZED HEALTH CARE EDUCATION/TRAINING PROGRAM

## 2023-12-11 PROCEDURE — 2500000001 HC RX 250 WO HCPCS SELF ADMINISTERED DRUGS (ALT 637 FOR MEDICARE OP): Performed by: STUDENT IN AN ORGANIZED HEALTH CARE EDUCATION/TRAINING PROGRAM

## 2023-12-11 PROCEDURE — 2500000004 HC RX 250 GENERAL PHARMACY W/ HCPCS (ALT 636 FOR OP/ED): Performed by: STUDENT IN AN ORGANIZED HEALTH CARE EDUCATION/TRAINING PROGRAM

## 2023-12-11 RX ORDER — IPRATROPIUM BROMIDE AND ALBUTEROL SULFATE 2.5; .5 MG/3ML; MG/3ML
3 SOLUTION RESPIRATORY (INHALATION) EVERY 2 HOUR PRN
Status: DISCONTINUED | OUTPATIENT
Start: 2023-12-11 | End: 2023-12-15 | Stop reason: HOSPADM

## 2023-12-11 RX ORDER — NYSTATIN 100000 [USP'U]/G
1 POWDER TOPICAL 2 TIMES DAILY
Status: DISCONTINUED | OUTPATIENT
Start: 2023-12-11 | End: 2023-12-15 | Stop reason: HOSPADM

## 2023-12-11 RX ADMIN — POLYETHYLENE GLYCOL 3350 17 G: 17 POWDER, FOR SOLUTION ORAL at 09:38

## 2023-12-11 RX ADMIN — FERROUS SULFATE TAB 325 MG (65 MG ELEMENTAL FE) 1 TABLET: 325 (65 FE) TAB at 09:38

## 2023-12-11 RX ADMIN — Medication 1000 MG: at 09:38

## 2023-12-11 RX ADMIN — METOPROLOL TARTRATE 25 MG: 25 TABLET, FILM COATED ORAL at 09:38

## 2023-12-11 RX ADMIN — METHYLPREDNISOLONE SODIUM SUCCINATE 40 MG: 40 INJECTION, POWDER, FOR SOLUTION INTRAMUSCULAR; INTRAVENOUS at 13:39

## 2023-12-11 RX ADMIN — TRAZODONE HYDROCHLORIDE 100 MG: 50 TABLET ORAL at 22:08

## 2023-12-11 RX ADMIN — METHYLPREDNISOLONE SODIUM SUCCINATE 40 MG: 40 INJECTION, POWDER, FOR SOLUTION INTRAMUSCULAR; INTRAVENOUS at 01:45

## 2023-12-11 RX ADMIN — METHYLPREDNISOLONE SODIUM SUCCINATE 40 MG: 40 INJECTION, POWDER, FOR SOLUTION INTRAMUSCULAR; INTRAVENOUS at 09:38

## 2023-12-11 RX ADMIN — METOPROLOL TARTRATE 25 MG: 25 TABLET, FILM COATED ORAL at 22:08

## 2023-12-11 RX ADMIN — METHYLPREDNISOLONE SODIUM SUCCINATE 40 MG: 40 INJECTION, POWDER, FOR SOLUTION INTRAMUSCULAR; INTRAVENOUS at 19:43

## 2023-12-11 RX ADMIN — NYSTATIN 1 APPLICATION: 100000 POWDER TOPICAL at 22:08

## 2023-12-11 RX ADMIN — FUROSEMIDE 20 MG: 40 TABLET ORAL at 09:39

## 2023-12-11 RX ADMIN — FERROUS SULFATE TAB 325 MG (65 MG ELEMENTAL FE) 1 TABLET: 325 (65 FE) TAB at 17:03

## 2023-12-11 RX ADMIN — ROSUVASTATIN CALCIUM 10 MG: 10 TABLET, FILM COATED ORAL at 09:39

## 2023-12-11 RX ADMIN — NYSTATIN 1 APPLICATION: 100000 POWDER TOPICAL at 09:00

## 2023-12-11 SDOH — ECONOMIC STABILITY: INCOME INSECURITY: IN THE PAST 12 MONTHS, HAS THE ELECTRIC, GAS, OIL, OR WATER COMPANY THREATENED TO SHUT OFF SERVICE IN YOUR HOME?: NO

## 2023-12-11 SDOH — ECONOMIC STABILITY: FOOD INSECURITY: WITHIN THE PAST 12 MONTHS, THE FOOD YOU BOUGHT JUST DIDN'T LAST AND YOU DIDN'T HAVE MONEY TO GET MORE.: NEVER TRUE

## 2023-12-11 SDOH — SOCIAL STABILITY: SOCIAL NETWORK: ARE YOU MARRIED, WIDOWED, DIVORCED, SEPARATED, NEVER MARRIED, OR LIVING WITH A PARTNER?: MARRIED

## 2023-12-11 SDOH — SOCIAL STABILITY: SOCIAL NETWORK: HOW OFTEN DO YOU GET TOGETHER WITH FRIENDS OR RELATIVES?: MORE THAN THREE TIMES A WEEK

## 2023-12-11 SDOH — ECONOMIC STABILITY: FOOD INSECURITY: WITHIN THE PAST 12 MONTHS, YOU WORRIED THAT YOUR FOOD WOULD RUN OUT BEFORE YOU GOT MONEY TO BUY MORE.: NEVER TRUE

## 2023-12-11 SDOH — HEALTH STABILITY: MENTAL HEALTH: HOW OFTEN DO YOU HAVE 6 OR MORE DRINKS ON ONE OCCASION?: NEVER

## 2023-12-11 SDOH — HEALTH STABILITY: PHYSICAL HEALTH: ON AVERAGE, HOW MANY DAYS PER WEEK DO YOU ENGAGE IN MODERATE TO STRENUOUS EXERCISE (LIKE A BRISK WALK)?: 0 DAYS

## 2023-12-11 SDOH — SOCIAL STABILITY: SOCIAL NETWORK
DO YOU BELONG TO ANY CLUBS OR ORGANIZATIONS SUCH AS CHURCH GROUPS UNIONS, FRATERNAL OR ATHLETIC GROUPS, OR SCHOOL GROUPS?: YES

## 2023-12-11 SDOH — SOCIAL STABILITY: SOCIAL NETWORK
IN A TYPICAL WEEK, HOW MANY TIMES DO YOU TALK ON THE PHONE WITH FAMILY, FRIENDS, OR NEIGHBORS?: MORE THAN THREE TIMES A WEEK

## 2023-12-11 SDOH — ECONOMIC STABILITY: HOUSING INSECURITY
IN THE LAST 12 MONTHS, WAS THERE A TIME WHEN YOU DID NOT HAVE A STEADY PLACE TO SLEEP OR SLEPT IN A SHELTER (INCLUDING NOW)?: NO

## 2023-12-11 SDOH — SOCIAL STABILITY: SOCIAL NETWORK: HOW OFTEN DO YOU ATTENT MEETINGS OF THE CLUB OR ORGANIZATION YOU BELONG TO?: MORE THAN 4 TIMES PER YEAR

## 2023-12-11 SDOH — ECONOMIC STABILITY: INCOME INSECURITY: IN THE LAST 12 MONTHS, WAS THERE A TIME WHEN YOU WERE NOT ABLE TO PAY THE MORTGAGE OR RENT ON TIME?: NO

## 2023-12-11 SDOH — HEALTH STABILITY: MENTAL HEALTH
STRESS IS WHEN SOMEONE FEELS TENSE, NERVOUS, ANXIOUS, OR CAN'T SLEEP AT NIGHT BECAUSE THEIR MIND IS TROUBLED. HOW STRESSED ARE YOU?: NOT AT ALL

## 2023-12-11 SDOH — SOCIAL STABILITY: SOCIAL INSECURITY: WITHIN THE LAST YEAR, HAVE YOU BEEN AFRAID OF YOUR PARTNER OR EX-PARTNER?: NO

## 2023-12-11 SDOH — SOCIAL STABILITY: SOCIAL INSECURITY
WITHIN THE LAST YEAR, HAVE TO BEEN RAPED OR FORCED TO HAVE ANY KIND OF SEXUAL ACTIVITY BY YOUR PARTNER OR EX-PARTNER?: NO

## 2023-12-11 SDOH — HEALTH STABILITY: MENTAL HEALTH: HOW OFTEN DO YOU HAVE A DRINK CONTAINING ALCOHOL?: 2-3 TIMES A WEEK

## 2023-12-11 SDOH — SOCIAL STABILITY: SOCIAL INSECURITY
WITHIN THE LAST YEAR, HAVE YOU BEEN KICKED, HIT, SLAPPED, OR OTHERWISE PHYSICALLY HURT BY YOUR PARTNER OR EX-PARTNER?: NO

## 2023-12-11 SDOH — SOCIAL STABILITY: SOCIAL INSECURITY: WITHIN THE LAST YEAR, HAVE YOU BEEN HUMILIATED OR EMOTIONALLY ABUSED IN OTHER WAYS BY YOUR PARTNER OR EX-PARTNER?: NO

## 2023-12-11 SDOH — HEALTH STABILITY: MENTAL HEALTH: HOW MANY STANDARD DRINKS CONTAINING ALCOHOL DO YOU HAVE ON A TYPICAL DAY?: 1 OR 2

## 2023-12-11 SDOH — ECONOMIC STABILITY: TRANSPORTATION INSECURITY
IN THE PAST 12 MONTHS, HAS THE LACK OF TRANSPORTATION KEPT YOU FROM MEDICAL APPOINTMENTS OR FROM GETTING MEDICATIONS?: NO

## 2023-12-11 SDOH — HEALTH STABILITY: PHYSICAL HEALTH: ON AVERAGE, HOW MANY MINUTES DO YOU ENGAGE IN EXERCISE AT THIS LEVEL?: 0 MIN

## 2023-12-11 SDOH — ECONOMIC STABILITY: TRANSPORTATION INSECURITY
IN THE PAST 12 MONTHS, HAS LACK OF TRANSPORTATION KEPT YOU FROM MEETINGS, WORK, OR FROM GETTING THINGS NEEDED FOR DAILY LIVING?: NO

## 2023-12-11 SDOH — ECONOMIC STABILITY: INCOME INSECURITY: HOW HARD IS IT FOR YOU TO PAY FOR THE VERY BASICS LIKE FOOD, HOUSING, MEDICAL CARE, AND HEATING?: NOT HARD AT ALL

## 2023-12-11 SDOH — SOCIAL STABILITY: SOCIAL NETWORK: HOW OFTEN DO YOU ATTEND CHURCH OR RELIGIOUS SERVICES?: MORE THAN 4 TIMES PER YEAR

## 2023-12-11 SDOH — ECONOMIC STABILITY: HOUSING INSECURITY: IN THE LAST 12 MONTHS, HOW MANY PLACES HAVE YOU LIVED?: 1

## 2023-12-11 ASSESSMENT — COGNITIVE AND FUNCTIONAL STATUS - GENERAL
CLIMB 3 TO 5 STEPS WITH RAILING: A LOT
TOILETING: A LOT
MOVING TO AND FROM BED TO CHAIR: A LOT
WALKING IN HOSPITAL ROOM: A LOT
STANDING UP FROM CHAIR USING ARMS: A LOT
DAILY ACTIVITIY SCORE: 16
MOVING FROM LYING ON BACK TO SITTING ON SIDE OF FLAT BED WITH BEDRAILS: A LITTLE
MOVING TO AND FROM BED TO CHAIR: A LOT
STANDING UP FROM CHAIR USING ARMS: A LOT
MOBILITY SCORE: 14
CLIMB 3 TO 5 STEPS WITH RAILING: A LOT
MOBILITY SCORE: 12
DRESSING REGULAR LOWER BODY CLOTHING: A LOT
WALKING IN HOSPITAL ROOM: A LOT
HELP NEEDED FOR BATHING: A LOT
DRESSING REGULAR UPPER BODY CLOTHING: A LOT
DRESSING REGULAR LOWER BODY CLOTHING: A LOT
DAILY ACTIVITIY SCORE: 16
DRESSING REGULAR UPPER BODY CLOTHING: A LOT
HELP NEEDED FOR BATHING: A LOT
TOILETING: A LOT
TURNING FROM BACK TO SIDE WHILE IN FLAT BAD: A LOT
MOVING FROM LYING ON BACK TO SITTING ON SIDE OF FLAT BED WITH BEDRAILS: A LOT
TURNING FROM BACK TO SIDE WHILE IN FLAT BAD: A LITTLE

## 2023-12-11 ASSESSMENT — LIFESTYLE VARIABLES
AUDIT-C TOTAL SCORE: 3
SKIP TO QUESTIONS 9-10: 1

## 2023-12-11 ASSESSMENT — PAIN SCALES - GENERAL
PAINLEVEL_OUTOF10: 0 - NO PAIN
PAINLEVEL_OUTOF10: 0 - NO PAIN

## 2023-12-11 ASSESSMENT — ACTIVITIES OF DAILY LIVING (ADL): LACK_OF_TRANSPORTATION: NO

## 2023-12-11 NOTE — CARE PLAN
The patient's goals for the shift include Relieve constipation    The clinical goals for the shift include Safety maintained and free from fall/injury    Problem: Pain - Adult  Goal: Verbalizes/displays adequate comfort level or baseline comfort level  Outcome: Progressing     Problem: Safety - Adult  Goal: Free from fall injury  Outcome: Progressing     Problem: Discharge Planning  Goal: Discharge to home or other facility with appropriate resources  Outcome: Progressing     Problem: Chronic Conditions and Co-morbidities  Goal: Patient's chronic conditions and co-morbidity symptoms are monitored and maintained or improved  Outcome: Progressing     Problem: Skin  Goal: Decreased wound size/increased tissue granulation at next dressing change  Outcome: Progressing  Goal: Participates in plan/prevention/treatment measures  Outcome: Progressing  Goal: Prevent/manage excess moisture  Outcome: Progressing  Goal: Prevent/minimize sheer/friction injuries  Outcome: Progressing  Goal: Promote/optimize nutrition  Outcome: Progressing  Goal: Promote skin healing  Outcome: Progressing     Problem: Fall/Injury  Goal: Not fall by end of shift  Outcome: Progressing  Goal: Be free from injury by end of the shift  Outcome: Progressing  Goal: Verbalize understanding of personal risk factors for fall in the hospital  Outcome: Progressing  Goal: Verbalize understanding of risk factor reduction measures to prevent injury from fall in the home  Outcome: Progressing  Goal: Use assistive devices by end of the shift  Outcome: Progressing  Goal: Pace activities to prevent fatigue by end of the shift  Outcome: Progressing     Problem: Pain  Goal: Takes deep breaths with improved pain control throughout the shift  Outcome: Progressing  Goal: Turns in bed with improved pain control throughout the shift  Outcome: Progressing  Goal: Walks with improved pain control throughout the shift  Outcome: Progressing  Goal: Performs ADL's with improved  pain control throughout shift  Outcome: Progressing  Goal: Free from opioid side effects throughout the shift  Outcome: Progressing  Goal: Free from acute confusion related to pain meds throughout the shift  Outcome: Progressing

## 2023-12-11 NOTE — NURSING NOTE
.Pulse Oximetry on room air at rest:  91  2.   Pulse Oximetry on room air during ambulation: N/A    3.   Pulse Oximetry on oxygen during ambulation:  N/A    4.   Amount of oxygen during ambulation:  N/A    5.   Pulse oximetry during recovery:    6.   Amount of oxygen during recovery:    Does this patient qualify for home O2?    What is the patient's Pulmonary Diagnosis:    What is the patient's DME company:    Comments:  RESPIRATORY NOTE: AT REST ON ROOM AIR SPO2 90-91%, ATTEMPTED TO STAND PT TO DO AN 02 WALK, HOWEVER PT. IS NOT ABLE TO STAND SAFELY INDEPENDENTLY PER SELF OR THIS RESPIRATORY THERAPIST ASSISTANCE.  WITHOUT CAUSING INJURY  TO PT. OR SELF PT. ALSO STRUGGLE TO SCOOT SELF UP IN BED, 02 REAPPLIED

## 2023-12-11 NOTE — PROGRESS NOTES
"Vinicius Arriola is a 88 y.o. male on day 1 of admission presenting with COVID.    Subjective   Patient seen and examined.  Says that he feels better compared to yesterday, currently comfortable, denying any chest pain, cough or phlegm, nausea vomiting or fever or chills.  On oxygen.       Objective     Physical Exam  Constitutional:       General: He is not in acute distress.     Appearance: He is obese. He is not toxic-appearing.   HENT:      Head: Normocephalic and atraumatic.   Eyes:      Extraocular Movements: Extraocular movements intact.      Conjunctiva/sclera: Conjunctivae normal.   Cardiovascular:      Rate and Rhythm: Normal rate and regular rhythm.      Pulses: Normal pulses.   Pulmonary:      Comments: Decreased breathing sounds bilaterally, on nasal cannula oxygen  Abdominal:      General: There is distension.      Palpations: Abdomen is soft.      Tenderness: There is no abdominal tenderness.   Musculoskeletal:         General: Deformity present.      Cervical back: No rigidity or tenderness.   Neurological:      General: No focal deficit present.      Mental Status: He is alert.   Last Recorded Vitals  Blood pressure 138/61, pulse 66, temperature 36.2 °C (97.2 °F), resp. rate 20, height 1.727 m (5' 8\"), weight 115 kg (254 lb 3.1 oz), SpO2 94 %.  Intake/Output last 3 Shifts:  I/O last 3 completed shifts:  In: - (0 mL/kg)   Out: 1 (0 mL/kg) [Urine:1 (0 mL/kg/hr)]  Weight: 115.3 kg     Relevant Results                             Assessment/Plan   Principal Problem:    COVID    88-year-old male with past medical history of mesothelioma, sleep apnea, obesity, CKD admitted with acute hypoxemic respiratory failure most likely secondary to progression of mesothelioma, possible pneumonia, elevated creatinine, hyponatremia with positive COVID     His acute hypoxemic respiratory failure and chest imaging changes are not related to COVID  His mesothelioma has been progressively getting worse  There is a " possibility of superimposed pneumonia  Started on Levaquin, Pro-Kevin was 0.03 can possibly discontinue Levaquin  Continue DuoNebs, CPAP at night  Continue IV steroids for now  Will order home O2 eval   Palliative care has been consulted, did have a detailed goals of care discussion with the patient and wife and they seem to understand  Pulmonology on board  Creatinine has remained relatively stable, continue to monitor  INR is 4.7, keep off Coumadin, do daily INR  Continue statin, trazodone, metoprolol, iron supplementation  Seems like was started on p.o. Lasix by pulmonology  DVT prophylaxis        Abraham Pugh MD

## 2023-12-11 NOTE — PROGRESS NOTES
12/11/23 1032   Discharge Planning   Living Arrangements Spouse/significant other   Support Systems Spouse/significant other   Assistance Needed ind ADLS with walker for ambulation, wife assist with IADLS, doesn't drive, no home O2 at this time but had in past, uses CPAP at night, recent fall but no injury   Type of Residence Private residence  (everything on 1 level)   Number of Stairs to Enter Residence 3  (with handrail and grab bar)   Do you have animals or pets at home? No   Home or Post Acute Services None   Patient expects to be discharged to: Home   Does the patient need discharge transport arranged? No   Financial Resource Strain   How hard is it for you to pay for the very basics like food, housing, medical care, and heating? Not hard   Housing Stability   In the last 12 months, was there a time when you were not able to pay the mortgage or rent on time? N   In the last 12 months, how many places have you lived? 1   In the last 12 months, was there a time when you did not have a steady place to sleep or slept in a shelter (including now)? N   Transportation Needs   In the past 12 months, has lack of transportation kept you from medical appointments or from getting medications? no   In the past 12 months, has lack of transportation kept you from meetings, work, or from getting things needed for daily living? No   Patient Choice   Provider Choice list and CMS website (https://medicare.gov/care-compare#search) for post-acute Quality and Resource Measure Data were provided and reviewed with: Family;Patient     Pt family called d/t home O2 sat 84% on room air and was not able to come off CPAP without being short of breath. Pt found to be Covid positive. Pt has history of Mesothelioma and VATS. Pt has had home O2 in past but does not currently have, uses CPAP at night. Pt/wife anticipates home DC. CT team will continue to monitor case for progression and potential DC needs.

## 2023-12-11 NOTE — PROGRESS NOTES
"Vinicius Arriola is a 88 y.o. male on day 1 of admission presenting with COVID.    Subjective   Complaining of cough and short of breath with any exertion.     Objective   Patient is stable on nasal cannula oxygen.  3 L nasal cannula.  ROS  Review of Systems   Review of Systems   Constitutional:  Positive for appetite change and fatigue.   HENT:  Positive for congestion.    Eyes: Negative.    Respiratory:  Positive for cough, shortness of breath and wheezing.    Gastrointestinal:  Positive for nausea and vomiting.   Endocrine: Negative.    Genitourinary: Negative.    Musculoskeletal:  Positive for arthralgias, joint swelling and myalgias.   Skin: Negative.    Allergic/Immunologic: Negative.    Neurological:  Positive for weakness.   Psychiatric/Behavioral:  Positive for sleep disturbance.    All other systems reviewed and are negative.     Vital Signs  Blood pressure 138/61, pulse 66, temperature 36.2 °C (97.2 °F), resp. rate 20, height 1.727 m (5' 8\"), weight 115 kg (254 lb 3.1 oz), SpO2 94 %.    Physical Exam   Vitals reviewed.   Constitutional:       Appearance: Normal appearance. He is obese.   HENT:      Head: Normocephalic and atraumatic.      Right Ear: Tympanic membrane and external ear normal.      Left Ear: Tympanic membrane and external ear normal.      Nose: Congestion present.      Mouth/Throat:      Mouth: Mucous membranes are dry.   Eyes:      Extraocular Movements: Extraocular movements intact.      Pupils: Pupils are equal, round, and reactive to light.   Cardiovascular:      Rate and Rhythm: Normal rate.      Pulses: Normal pulses.      Heart sounds: Normal heart sounds.   Pulmonary:      Breath sounds: Wheezing and rales present.   Abdominal:      Palpations: Abdomen is soft.   Musculoskeletal:         General: Normal range of motion.      Cervical back: Normal range of motion.   Skin:     General: Skin is dry.      Capillary Refill: Capillary refill takes 2 to 3 seconds.   Neurological:      " General: No focal deficit present.      Mental Status: He is alert and oriented to person, place, and time.   Psychiatric:         Mood and Affect: Mood normal.         Behavior: Behavior normal.         Oxygen Therapy  SpO2: 94 %  Medical Gas Therapy: Supplemental oxygen  O2 Delivery Method: Nasal cannula (3L)       Intake/Output  I/O last 3 completed shifts:  In: - (0 mL/kg)   Out: 1 (0 mL/kg) [Urine:1 (0 mL/kg/hr)]  Weight: 115.3 kg     Lines and Tubes:  Peripheral IV 12/10/23 20 G Right Antecubital (Active)   Placement Date/Time: 12/10/23 1127   Size (Gauge): 20 G  Orientation: Right  Location: Antecubital   Number of days: 1       Results for orders placed or performed during the hospital encounter of 12/10/23 (from the past 96 hour(s))   CBC and Auto Differential   Result Value Ref Range    WBC 11.5 (H) 4.4 - 11.3 x10*3/uL    nRBC 0.0 0.0 - 0.0 /100 WBCs    RBC 4.61 4.50 - 5.90 x10*6/uL    Hemoglobin 13.2 (L) 13.5 - 17.5 g/dL    Hematocrit 41.4 41.0 - 52.0 %    MCV 90 80 - 100 fL    MCH 28.6 26.0 - 34.0 pg    MCHC 31.9 (L) 32.0 - 36.0 g/dL    RDW 15.6 (H) 11.5 - 14.5 %    Platelets 218 150 - 450 x10*3/uL    Neutrophils % 83.7 40.0 - 80.0 %    Immature Granulocytes %, Automated 0.5 0.0 - 0.9 %    Lymphocytes % 5.3 13.0 - 44.0 %    Monocytes % 9.5 2.0 - 10.0 %    Eosinophils % 0.8 0.0 - 6.0 %    Basophils % 0.2 0.0 - 2.0 %    Neutrophils Absolute 9.64 (H) 1.60 - 5.50 x10*3/uL    Immature Granulocytes Absolute, Automated 0.06 0.00 - 0.50 x10*3/uL    Lymphocytes Absolute 0.61 (L) 0.80 - 3.00 x10*3/uL    Monocytes Absolute 1.09 (H) 0.05 - 0.80 x10*3/uL    Eosinophils Absolute 0.09 0.00 - 0.40 x10*3/uL    Basophils Absolute 0.02 0.00 - 0.10 x10*3/uL   Comprehensive Metabolic Panel   Result Value Ref Range    Glucose 95 74 - 99 mg/dL    Sodium 132 (L) 136 - 145 mmol/L    Potassium 4.5 3.5 - 5.3 mmol/L    Chloride 99 98 - 107 mmol/L    Bicarbonate 27 21 - 32 mmol/L    Anion Gap 11 10 - 20 mmol/L    Urea Nitrogen 19 6  - 23 mg/dL    Creatinine 1.36 (H) 0.50 - 1.30 mg/dL    eGFR 51 (L) >60 mL/min/1.73m*2    Calcium 9.3 8.6 - 10.3 mg/dL    Albumin 3.8 3.4 - 5.0 g/dL    Alkaline Phosphatase 63 33 - 136 U/L    Total Protein 7.1 6.4 - 8.2 g/dL    AST 15 9 - 39 U/L    Bilirubin, Total 0.4 0.0 - 1.2 mg/dL    ALT 11 10 - 52 U/L   Magnesium   Result Value Ref Range    Magnesium 1.94 1.60 - 2.40 mg/dL   Protime-INR   Result Value Ref Range    Protime 50.3 (H) 9.8 - 12.8 seconds    INR 4.4 (H) 0.9 - 1.1   B-Type Natriuretic Peptide   Result Value Ref Range     (H) 0 - 99 pg/mL   Troponin I, High Sensitivity, Initial   Result Value Ref Range    Troponin I, High Sensitivity 15 0 - 20 ng/L   Sars-CoV-2 and Influenza A/B PCR   Result Value Ref Range    Flu A Result Not Detected Not Detected    Flu B Result Not Detected Not Detected    Coronavirus 2019, PCR Detected (A) Not Detected   Troponin, High Sensitivity, 1 Hour   Result Value Ref Range    Troponin I, High Sensitivity 17 0 - 20 ng/L   Procalcitonin   Result Value Ref Range    Procalcitonin 0.03 <=0.07 ng/mL   SST TOP   Result Value Ref Range    Extra Tube Hold for add-ons.    Protime-INR   Result Value Ref Range    Protime 53.8 (H) 9.8 - 12.8 seconds    INR 4.7 (H) 0.9 - 1.1   Basic metabolic panel   Result Value Ref Range    Glucose 150 (H) 74 - 99 mg/dL    Sodium 133 (L) 136 - 145 mmol/L    Potassium 4.7 3.5 - 5.3 mmol/L    Chloride 101 98 - 107 mmol/L    Bicarbonate 26 21 - 32 mmol/L    Anion Gap 11 10 - 20 mmol/L    Urea Nitrogen 25 (H) 6 - 23 mg/dL    Creatinine 1.44 (H) 0.50 - 1.30 mg/dL    eGFR 47 (L) >60 mL/min/1.73m*2    Calcium 9.1 8.6 - 10.3 mg/dL   CBC and Auto Differential   Result Value Ref Range    WBC 10.3 4.4 - 11.3 x10*3/uL    nRBC 0.0 0.0 - 0.0 /100 WBCs    RBC 4.48 (L) 4.50 - 5.90 x10*6/uL    Hemoglobin 12.7 (L) 13.5 - 17.5 g/dL    Hematocrit 39.2 (L) 41.0 - 52.0 %    MCV 88 80 - 100 fL    MCH 28.3 26.0 - 34.0 pg    MCHC 32.4 32.0 - 36.0 g/dL    RDW 15.6 (H) 11.5  - 14.5 %    Platelets 215 150 - 450 x10*3/uL    Neutrophils % 92.5 40.0 - 80.0 %    Immature Granulocytes %, Automated 0.5 0.0 - 0.9 %    Lymphocytes % 5.5 13.0 - 44.0 %    Monocytes % 1.4 2.0 - 10.0 %    Eosinophils % 0.0 0.0 - 6.0 %    Basophils % 0.1 0.0 - 2.0 %    Neutrophils Absolute 9.52 (H) 1.60 - 5.50 x10*3/uL    Immature Granulocytes Absolute, Automated 0.05 0.00 - 0.50 x10*3/uL    Lymphocytes Absolute 0.57 (L) 0.80 - 3.00 x10*3/uL    Monocytes Absolute 0.14 0.05 - 0.80 x10*3/uL    Eosinophils Absolute 0.00 0.00 - 0.40 x10*3/uL    Basophils Absolute 0.01 0.00 - 0.10 x10*3/uL      Relevant Results  Recent Results (from the past 24 hour(s))   Troponin, High Sensitivity, 1 Hour    Collection Time: 12/10/23  5:35 PM   Result Value Ref Range    Troponin I, High Sensitivity 17 0 - 20 ng/L   Procalcitonin    Collection Time: 12/10/23  9:25 PM   Result Value Ref Range    Procalcitonin 0.03 <=0.07 ng/mL   SST TOP    Collection Time: 12/11/23  5:52 AM   Result Value Ref Range    Extra Tube Hold for add-ons.    Protime-INR    Collection Time: 12/11/23  5:55 AM   Result Value Ref Range    Protime 53.8 (H) 9.8 - 12.8 seconds    INR 4.7 (H) 0.9 - 1.1   Basic metabolic panel    Collection Time: 12/11/23  5:55 AM   Result Value Ref Range    Glucose 150 (H) 74 - 99 mg/dL    Sodium 133 (L) 136 - 145 mmol/L    Potassium 4.7 3.5 - 5.3 mmol/L    Chloride 101 98 - 107 mmol/L    Bicarbonate 26 21 - 32 mmol/L    Anion Gap 11 10 - 20 mmol/L    Urea Nitrogen 25 (H) 6 - 23 mg/dL    Creatinine 1.44 (H) 0.50 - 1.30 mg/dL    eGFR 47 (L) >60 mL/min/1.73m*2    Calcium 9.1 8.6 - 10.3 mg/dL   CBC and Auto Differential    Collection Time: 12/11/23  5:55 AM   Result Value Ref Range    WBC 10.3 4.4 - 11.3 x10*3/uL    nRBC 0.0 0.0 - 0.0 /100 WBCs    RBC 4.48 (L) 4.50 - 5.90 x10*6/uL    Hemoglobin 12.7 (L) 13.5 - 17.5 g/dL    Hematocrit 39.2 (L) 41.0 - 52.0 %    MCV 88 80 - 100 fL    MCH 28.3 26.0 - 34.0 pg    MCHC 32.4 32.0 - 36.0 g/dL    RDW  "15.6 (H) 11.5 - 14.5 %    Platelets 215 150 - 450 x10*3/uL    Neutrophils % 92.5 40.0 - 80.0 %    Immature Granulocytes %, Automated 0.5 0.0 - 0.9 %    Lymphocytes % 5.5 13.0 - 44.0 %    Monocytes % 1.4 2.0 - 10.0 %    Eosinophils % 0.0 0.0 - 6.0 %    Basophils % 0.1 0.0 - 2.0 %    Neutrophils Absolute 9.52 (H) 1.60 - 5.50 x10*3/uL    Immature Granulocytes Absolute, Automated 0.05 0.00 - 0.50 x10*3/uL    Lymphocytes Absolute 0.57 (L) 0.80 - 3.00 x10*3/uL    Monocytes Absolute 0.14 0.05 - 0.80 x10*3/uL    Eosinophils Absolute 0.00 0.00 - 0.40 x10*3/uL    Basophils Absolute 0.01 0.00 - 0.10 x10*3/uL      CT chest wo IV contrast    Result Date: 12/10/2023  STUDY: CT Chest without IV Contrast; 12/10/2023, 2:54PM. INDICATION: Follow up to abnormal chest radiograph, \"Persistent dense consolidation throughout the left chest.  Mesothelioma. COMPARISON: Chest x-ray 12/10/2023 and 2/17/2023.  CT chest 3/21/2023 and 6/14/2023 ACCESSION NUMBER(S): [id number] ORDERING CLINICIAN: ELIEL MENDIOLA TECHNIQUE:  CT of the chest was performed without contrast.  Automated mA/kV exposure control was utilized and patient examination was performed in strict accordance with principles of ALARA. FINDINGS: MEDIASTINUM: Shift of the mediastinal structures towards the left hemithorax. Cardiac size is stable.  Pacemaker leads are present.  The unenhanced thoracic aorta shows no evidence of aneurysm.  Small mediastinal lymph nodes.  Largest lymph node is a prevascular AP window lymph node on image 24 series 201 measuring 1.3 cm and unchanged. LUNGS/PLEURA: Very small left pleural effusion.  The airways are patent. The right lung is clear.  Diffuse airspace densities with air bronchograms left hemithorax. LYMPH NODES: As above. UPPER ABDOMEN: Cholelithiasis. BONES: There are no acute fractures.  No suspicious bony lesions.    There are chronic consolidative changes throughout the left upper lobe and left lower lobe.  Overall the consolidative " "changes have progressed slightly since CT chest 3/21/2023 and 6/14/2023.  There is chronic volume loss left hemithorax with shift of the mediastinal structures towards the left.  The patient has a history of mesothelioma.  The chronic consolidative changes throughout the left lung could be due to pneumonitis if the patient has had prior radiation therapy.  Infection/pneumonia cannot be completely excluded.  The right lung is clear. Mild mediastinal lymphadenopathy is stable. Cholelithiasis. Signed by Maicol Tinoco MD    XR chest 1 view    Result Date: 12/10/2023  STUDY: Chest Radiograph;  12/10/2023 12:18PM INDICATION: Shortness of breath. COMPARISON: XR Chest 02/17/2023 ACCESSION NUMBER(S): [id number] ORDERING CLINICIAN: ELIEL MENDIOLA TECHNIQUE:  Frontal chest (two images) was obtained at 12:17 hours. FINDINGS: Dense consolidation persists throughout the left chest. Right chest remains clear. Mediastinal structures are shifted to the left. Right pacemaker remains in place. There is no pneumothorax.    Persistent dense consolidation throughout the left chest. Remainder as above. Signed by Angel Rojas MD    ECG 12 lead (Clinic Performed)    Result Date: 12/6/2023  EKG performed today shows atrial paced rhythm rate of 66 bpm rotation 74 ms QT corrected 423 ms.  Rhythm strip shows the same pattern.    Radiology:  CT chest wo IV contrast    Result Date: 12/10/2023  STUDY: CT Chest without IV Contrast; 12/10/2023, 2:54PM. INDICATION: Follow up to abnormal chest radiograph, \"Persistent dense consolidation throughout the left chest.  Mesothelioma. COMPARISON: Chest x-ray 12/10/2023 and 2/17/2023.  CT chest 3/21/2023 and 6/14/2023 ACCESSION NUMBER(S): [id number] ORDERING CLINICIAN: ELIEL MENDIOLA TECHNIQUE:  CT of the chest was performed without contrast.  Automated mA/kV exposure control was utilized and patient examination was performed in strict accordance with principles of ALARA. FINDINGS: MEDIASTINUM: Shift of the " mediastinal structures towards the left hemithorax. Cardiac size is stable.  Pacemaker leads are present.  The unenhanced thoracic aorta shows no evidence of aneurysm.  Small mediastinal lymph nodes.  Largest lymph node is a prevascular AP window lymph node on image 24 series 201 measuring 1.3 cm and unchanged. LUNGS/PLEURA: Very small left pleural effusion.  The airways are patent. The right lung is clear.  Diffuse airspace densities with air bronchograms left hemithorax. LYMPH NODES: As above. UPPER ABDOMEN: Cholelithiasis. BONES: There are no acute fractures.  No suspicious bony lesions.    There are chronic consolidative changes throughout the left upper lobe and left lower lobe.  Overall the consolidative changes have progressed slightly since CT chest 3/21/2023 and 6/14/2023.  There is chronic volume loss left hemithorax with shift of the mediastinal structures towards the left.  The patient has a history of mesothelioma.  The chronic consolidative changes throughout the left lung could be due to pneumonitis if the patient has had prior radiation therapy.  Infection/pneumonia cannot be completely excluded.  The right lung is clear. Mild mediastinal lymphadenopathy is stable. Cholelithiasis. Signed by Maicol Tinoco MD    XR chest 1 view    Result Date: 12/10/2023  STUDY: Chest Radiograph;  12/10/2023 12:18PM INDICATION: Shortness of breath. COMPARISON: XR Chest 02/17/2023 ACCESSION NUMBER(S): [id number] ORDERING CLINICIAN: ELIEL MENDIOLA TECHNIQUE:  Frontal chest (two images) was obtained at 12:17 hours. FINDINGS: Dense consolidation persists throughout the left chest. Right chest remains clear. Mediastinal structures are shifted to the left. Right pacemaker remains in place. There is no pneumothorax.    Persistent dense consolidation throughout the left chest. Remainder as above. Signed by Angel Rojas MD     Current Facility-Administered Medications:     acetaminophen (Tylenol) tablet 650 mg, 650 mg, oral,  q4h PRN **OR** acetaminophen (Tylenol) oral liquid 650 mg, 650 mg, nasogastric tube, q4h PRN **OR** acetaminophen (Tylenol) suppository 650 mg, 650 mg, rectal, q4h PRN, Abraham Pugh MD    acetaminophen (Tylenol) tablet 650 mg, 650 mg, oral, q4h PRN, 650 mg at 12/10/23 2247 **OR** acetaminophen (Tylenol) oral liquid 650 mg, 650 mg, oral, q4h PRN **OR** acetaminophen (Tylenol) suppository 650 mg, 650 mg, rectal, q4h PRN, Abraham Pugh MD    ascorbic acid (Vitamin C) tablet 1,000 mg, 1,000 mg, oral, Daily, Abraham Pugh MD, 1,000 mg at 12/11/23 0938    benzocaine-menthol (Cepastat Sore Throat) 15-3.6 mg lozenge 1 lozenge, 1 lozenge, Mouth/Throat, q2h PRN, Abraham Pugh MD    dextromethorphan-guaifenesin (Robitussin DM)  mg/5 mL oral liquid 5 mL, 5 mL, oral, q4h PRN, Abraham Pugh MD    ferrous sulfate (325 mg ferrous sulfate) tablet 1 tablet, 65 mg of iron, oral, BID with meals, Abraham Pugh MD, 1 tablet at 12/11/23 1703    furosemide (Lasix) tablet 20 mg, 20 mg, oral, Daily, Abraham Pugh MD, 20 mg at 12/11/23 0939    ipratropium-albuteroL (Duo-Neb) 0.5-2.5 mg/3 mL nebulizer solution 3 mL, 3 mL, nebulization, q2h PRN, Abraham Pugh MD    levoFLOXacin (Levaquin)  mg, 750 mg, intravenous, q48h, Abraham Pugh MD, Stopped at 12/10/23 2138    lubricating eye drops ophthalmic solution 1 drop, 1 drop, Both Eyes, TID PRN, Abraham Pugh MD    melatonin tablet 5 mg, 5 mg, oral, Nightly PRN, Abraham Pugh MD    methylPREDNISolone sod succinate (PF) (SOLU-Medrol) 40 mg/mL injection 40 mg, 40 mg, intravenous, q6h, Abraham Pugh MD, 40 mg at 12/11/23 1339    metoprolol tartrate (Lopressor) tablet 25 mg, 25 mg, oral, BID, Abraham Pugh MD, 25 mg at 12/11/23 0938    nitroglycerin (Nitrostat) SL tablet 0.4 mg, 0.4 mg, sublingual, q5 min PRN, Abraham Pugh MD    nystatin (Mycostatin) 100,000 unit/gram powder 1 Application, 1 Application, Topical, BID, Vonnei ANDERSON  Amari, APRN-CNP, 1 Application at 12/11/23 0900    ondansetron (Zofran) tablet 4 mg, 4 mg, oral, q8h PRN **OR** ondansetron (Zofran) injection 4 mg, 4 mg, intravenous, q8h PRN, Abraham Pugh MD    polyethylene glycol (Glycolax, Miralax) packet 17 g, 17 g, oral, Daily, Abraham Pugh MD, 17 g at 12/11/23 0938    potassium chloride CR (Klor-Con) ER tablet 10 mEq, 10 mEq, oral, Daily, Abraham Pugh MD    rosuvastatin (Crestor) tablet 10 mg, 10 mg, oral, Daily, Abraham Pugh MD, 10 mg at 12/11/23 0939    traZODone (Desyrel) tablet 100 mg, 100 mg, oral, Nightly, Abraham Pugh MD, 100 mg at 12/10/23 2009   Principal Problem:    COVID      Assessment/Plan      Acute hypoxic respiratory failure continue oxygen keeping saturation more than 90% patient on 3 L oxygen nasal cannula.  Patient O2 saturation on room air was 84% on arrival.  And at home as well.  History of mesothelioma s/p left VATS and pleurodesis and s/p radiation therapy for mesothelioma.  Mesothelioma getting worse for many months.  Patient and family aware and discussed with them.  Consider palliative care.  Case discussed with patient and wife.  And ER physician.  Hospitalist updated about this mesothelioma diagnosis.  Consider changing the CODE STATUS to DNR.  Left-sided consolidation and volume loss is secondary to mesothelioma, atelectasis, infiltrate cannot be ruled out agree with antibiotic.  Covid-19 infection.  COPD exacerbation in a former smoker.  Continue bronchodilator as ordered.  Sleep apnea patient on BiPAP at home.  Morbid obesity patient advised reduce weight by diet exercise.  Hypertension.  Dyslipidemia.  Coronary disease PTCA in the remote past.  History of pulmonary embolism and DVT in the past.  Paroxysmal atrial fibrillation history.  Sick sinus syndrome and pacemaker placement.  Chronic kidney disease.  BPH.  EtOH counseling done.  DVT prophylaxis.  PT OT.  P.o. diet.  Palliative care consulted.       Luis Villasenor,  MD

## 2023-12-12 LAB
ANION GAP SERPL CALC-SCNC: 10 MMOL/L (ref 10–20)
BASOPHILS # BLD AUTO: 0.02 X10*3/UL (ref 0–0.1)
BASOPHILS NFR BLD AUTO: 0.1 %
BUN SERPL-MCNC: 32 MG/DL (ref 6–23)
CALCIUM SERPL-MCNC: 9.6 MG/DL (ref 8.6–10.3)
CHLORIDE SERPL-SCNC: 102 MMOL/L (ref 98–107)
CO2 SERPL-SCNC: 30 MMOL/L (ref 21–32)
CREAT SERPL-MCNC: 1.36 MG/DL (ref 0.5–1.3)
EOSINOPHIL # BLD AUTO: 0 X10*3/UL (ref 0–0.4)
EOSINOPHIL NFR BLD AUTO: 0 %
ERYTHROCYTE [DISTWIDTH] IN BLOOD BY AUTOMATED COUNT: 15.5 % (ref 11.5–14.5)
GFR SERPL CREATININE-BSD FRML MDRD: 51 ML/MIN/1.73M*2
GLUCOSE SERPL-MCNC: 145 MG/DL (ref 74–99)
HCT VFR BLD AUTO: 42 % (ref 41–52)
HGB BLD-MCNC: 13.6 G/DL (ref 13.5–17.5)
HOLD SPECIMEN: NORMAL
IMM GRANULOCYTES # BLD AUTO: 0.08 X10*3/UL (ref 0–0.5)
IMM GRANULOCYTES NFR BLD AUTO: 0.5 % (ref 0–0.9)
INR PPP: 4.5 (ref 0.9–1.1)
LYMPHOCYTES # BLD AUTO: 0.84 X10*3/UL (ref 0.8–3)
LYMPHOCYTES NFR BLD AUTO: 5.1 %
MCH RBC QN AUTO: 28.3 PG (ref 26–34)
MCHC RBC AUTO-ENTMCNC: 32.4 G/DL (ref 32–36)
MCV RBC AUTO: 87 FL (ref 80–100)
MONOCYTES # BLD AUTO: 0.59 X10*3/UL (ref 0.05–0.8)
MONOCYTES NFR BLD AUTO: 3.6 %
NEUTROPHILS # BLD AUTO: 14.88 X10*3/UL (ref 1.6–5.5)
NEUTROPHILS NFR BLD AUTO: 90.7 %
NRBC BLD-RTO: 0 /100 WBCS (ref 0–0)
PLATELET # BLD AUTO: 242 X10*3/UL (ref 150–450)
POTASSIUM SERPL-SCNC: 4.9 MMOL/L (ref 3.5–5.3)
PROTHROMBIN TIME: 52.1 SECONDS (ref 9.8–12.8)
RBC # BLD AUTO: 4.81 X10*6/UL (ref 4.5–5.9)
SODIUM SERPL-SCNC: 137 MMOL/L (ref 136–145)
WBC # BLD AUTO: 16.4 X10*3/UL (ref 4.4–11.3)

## 2023-12-12 PROCEDURE — 80048 BASIC METABOLIC PNL TOTAL CA: CPT | Performed by: STUDENT IN AN ORGANIZED HEALTH CARE EDUCATION/TRAINING PROGRAM

## 2023-12-12 PROCEDURE — 85025 COMPLETE CBC W/AUTO DIFF WBC: CPT | Performed by: STUDENT IN AN ORGANIZED HEALTH CARE EDUCATION/TRAINING PROGRAM

## 2023-12-12 PROCEDURE — 85610 PROTHROMBIN TIME: CPT | Performed by: STUDENT IN AN ORGANIZED HEALTH CARE EDUCATION/TRAINING PROGRAM

## 2023-12-12 PROCEDURE — 2500000004 HC RX 250 GENERAL PHARMACY W/ HCPCS (ALT 636 FOR OP/ED): Performed by: STUDENT IN AN ORGANIZED HEALTH CARE EDUCATION/TRAINING PROGRAM

## 2023-12-12 PROCEDURE — 99231 SBSQ HOSP IP/OBS SF/LOW 25: CPT | Performed by: NURSE PRACTITIONER

## 2023-12-12 PROCEDURE — 36415 COLL VENOUS BLD VENIPUNCTURE: CPT | Performed by: STUDENT IN AN ORGANIZED HEALTH CARE EDUCATION/TRAINING PROGRAM

## 2023-12-12 PROCEDURE — 99233 SBSQ HOSP IP/OBS HIGH 50: CPT | Performed by: INTERNAL MEDICINE

## 2023-12-12 PROCEDURE — 2500000001 HC RX 250 WO HCPCS SELF ADMINISTERED DRUGS (ALT 637 FOR MEDICARE OP): Performed by: STUDENT IN AN ORGANIZED HEALTH CARE EDUCATION/TRAINING PROGRAM

## 2023-12-12 PROCEDURE — 1100000001 HC PRIVATE ROOM DAILY

## 2023-12-12 RX ADMIN — FUROSEMIDE 20 MG: 40 TABLET ORAL at 09:19

## 2023-12-12 RX ADMIN — METHYLPREDNISOLONE SODIUM SUCCINATE 40 MG: 40 INJECTION, POWDER, FOR SOLUTION INTRAMUSCULAR; INTRAVENOUS at 07:26

## 2023-12-12 RX ADMIN — METHYLPREDNISOLONE SODIUM SUCCINATE 40 MG: 40 INJECTION, POWDER, FOR SOLUTION INTRAMUSCULAR; INTRAVENOUS at 01:58

## 2023-12-12 RX ADMIN — METOPROLOL TARTRATE 25 MG: 25 TABLET, FILM COATED ORAL at 20:23

## 2023-12-12 RX ADMIN — ROSUVASTATIN CALCIUM 10 MG: 10 TABLET, FILM COATED ORAL at 09:19

## 2023-12-12 RX ADMIN — Medication 1000 MG: at 09:19

## 2023-12-12 RX ADMIN — FERROUS SULFATE TAB 325 MG (65 MG ELEMENTAL FE) 1 TABLET: 325 (65 FE) TAB at 17:10

## 2023-12-12 RX ADMIN — LEVOFLOXACIN 750 MG: 5 INJECTION, SOLUTION INTRAVENOUS at 20:23

## 2023-12-12 RX ADMIN — METHYLPREDNISOLONE SODIUM SUCCINATE 40 MG: 40 INJECTION, POWDER, FOR SOLUTION INTRAMUSCULAR; INTRAVENOUS at 13:38

## 2023-12-12 RX ADMIN — FERROUS SULFATE TAB 325 MG (65 MG ELEMENTAL FE) 1 TABLET: 325 (65 FE) TAB at 07:26

## 2023-12-12 RX ADMIN — TRAZODONE HYDROCHLORIDE 100 MG: 50 TABLET ORAL at 20:23

## 2023-12-12 RX ADMIN — METHYLPREDNISOLONE SODIUM SUCCINATE 40 MG: 40 INJECTION, POWDER, FOR SOLUTION INTRAMUSCULAR; INTRAVENOUS at 20:23

## 2023-12-12 RX ADMIN — METOPROLOL TARTRATE 25 MG: 25 TABLET, FILM COATED ORAL at 09:19

## 2023-12-12 RX ADMIN — NYSTATIN 1 APPLICATION: 100000 POWDER TOPICAL at 09:00

## 2023-12-12 ASSESSMENT — COGNITIVE AND FUNCTIONAL STATUS - GENERAL
WALKING IN HOSPITAL ROOM: A LOT
MOVING TO AND FROM BED TO CHAIR: A LOT
DAILY ACTIVITIY SCORE: 16
DAILY ACTIVITIY SCORE: 15
MOBILITY SCORE: 14
CLIMB 3 TO 5 STEPS WITH RAILING: A LOT
HELP NEEDED FOR BATHING: A LOT
DRESSING REGULAR UPPER BODY CLOTHING: A LOT
WALKING IN HOSPITAL ROOM: A LOT
STANDING UP FROM CHAIR USING ARMS: A LOT
TURNING FROM BACK TO SIDE WHILE IN FLAT BAD: A LITTLE
PERSONAL GROOMING: A LITTLE
TOILETING: A LOT
TOILETING: A LOT
TURNING FROM BACK TO SIDE WHILE IN FLAT BAD: A LITTLE
CLIMB 3 TO 5 STEPS WITH RAILING: A LOT
STANDING UP FROM CHAIR USING ARMS: A LOT
HELP NEEDED FOR BATHING: A LOT
DRESSING REGULAR LOWER BODY CLOTHING: A LOT
DRESSING REGULAR LOWER BODY CLOTHING: A LOT
MOVING FROM LYING ON BACK TO SITTING ON SIDE OF FLAT BED WITH BEDRAILS: A LITTLE
DRESSING REGULAR UPPER BODY CLOTHING: A LOT
MOVING TO AND FROM BED TO CHAIR: A LOT
MOBILITY SCORE: 14
MOVING FROM LYING ON BACK TO SITTING ON SIDE OF FLAT BED WITH BEDRAILS: A LITTLE

## 2023-12-12 ASSESSMENT — PAIN SCALES - GENERAL
PAINLEVEL_OUTOF10: 0 - NO PAIN
PAINLEVEL_OUTOF10: 0 - NO PAIN

## 2023-12-12 NOTE — PROGRESS NOTES
Patient admitted with COVID and worsening Mesothelioma, palliative consult ordered. Patient prefers home, however PT/OT should evaluate prior to discharge, will ask PCP to place order.

## 2023-12-12 NOTE — PROGRESS NOTES
"Vinicius Arriola is a 88 y.o. male on day 2 of admission presenting with COVID.    Subjective   Patient seen and examined.  Says that he feels better compared to yesterday, currently comfortable, denying any chest pain, cough or phlegm, nausea vomiting or fever or chills.  On oxygen.       Objective     Physical Exam  Constitutional:       General: He is not in acute distress.     Appearance: He is obese. He is not toxic-appearing.   HENT:      Head: Normocephalic and atraumatic.   Eyes:      Extraocular Movements: Extraocular movements intact.      Conjunctiva/sclera: Conjunctivae normal.   Cardiovascular:      Rate and Rhythm: Normal rate and regular rhythm.      Pulses: Normal pulses.   Pulmonary:      Comments: Decreased breathing sounds bilaterally, on nasal cannula oxygen  Abdominal:      General: There is distension.      Palpations: Abdomen is soft.      Tenderness: There is no abdominal tenderness.   Musculoskeletal:         General: Deformity present.      Cervical back: No rigidity or tenderness.   Neurological:      General: No focal deficit present.      Mental Status: He is alert.   Last Recorded Vitals  Blood pressure 113/58, pulse 64, temperature 36.2 °C (97.1 °F), temperature source Temporal, resp. rate 20, height 1.727 m (5' 8\"), weight 115 kg (254 lb 3.1 oz), SpO2 95 %.  Intake/Output last 3 Shifts:  I/O last 3 completed shifts:  In: 150 (1.3 mL/kg) [IV Piggyback:150]  Out: 2 (0 mL/kg) [Urine:1 (0 mL/kg/hr); Stool:1]  Weight: 115.3 kg     Relevant Results                             Assessment/Plan   Principal Problem:    COVID    88-year-old male with past medical history of mesothelioma, sleep apnea, obesity, CKD admitted with acute hypoxemic respiratory failure most likely secondary to progression of mesothelioma, possible pneumonia, elevated creatinine, hyponatremia with positive COVID     His acute hypoxemic respiratory failure and chest imaging changes are not related to COVID  His " mesothelioma has been progressively getting worse  There is a possibility of superimposed pneumonia  Started on Levaquin, Pro-Kevin was 0.03 can possibly discontinue Levaquin  Continue DuoNebs, CPAP at night  Continue IV steroids for now  Will order home O2 eval  Palliative care has been consulted, did have a detailed goals of care discussion with the patient and wife and they seem to understand  Pulmonology on board  Creatinine has remained relatively stable, continue to monitor  INR is 4.5, keep off Coumadin, do daily INR  Continue statin, trazodone, metoprolol, iron supplementation  Seems like was started on p.o. Lasix by pulmonology  DVT prophylaxis        Ruiz Manzo MD

## 2023-12-12 NOTE — CONSULTS
Consults    Reason For Consult  Goals of care    -Met with patient, he prefers to continue goals of care with his spouse present.  I was notified by RN that she contacted the division and is ill.  She will not be visiting today.  Attempted to contact her several times to attempt a phone conversation with patient.  Her voicemail box is full and unable to leave a message.  Should she be available either on phone or in person I am happy to conduct goals of care conversations.  He is appropriate to consider code status change and will discuss with them.         I spent 20 minutes in the professional and overall care of this patient.      Hossein Holder, APRN-CNP

## 2023-12-13 LAB
ANION GAP SERPL CALC-SCNC: 9 MMOL/L (ref 10–20)
BASOPHILS # BLD AUTO: 0.02 X10*3/UL (ref 0–0.1)
BASOPHILS NFR BLD AUTO: 0.1 %
BUN SERPL-MCNC: 33 MG/DL (ref 6–23)
CALCIUM SERPL-MCNC: 9 MG/DL (ref 8.6–10.3)
CHLORIDE SERPL-SCNC: 102 MMOL/L (ref 98–107)
CO2 SERPL-SCNC: 28 MMOL/L (ref 21–32)
CREAT SERPL-MCNC: 1.28 MG/DL (ref 0.5–1.3)
EOSINOPHIL # BLD AUTO: 0 X10*3/UL (ref 0–0.4)
EOSINOPHIL NFR BLD AUTO: 0 %
ERYTHROCYTE [DISTWIDTH] IN BLOOD BY AUTOMATED COUNT: 15.6 % (ref 11.5–14.5)
GFR SERPL CREATININE-BSD FRML MDRD: 55 ML/MIN/1.73M*2
GLUCOSE SERPL-MCNC: 153 MG/DL (ref 74–99)
HCT VFR BLD AUTO: 40.1 % (ref 41–52)
HGB BLD-MCNC: 13.1 G/DL (ref 13.5–17.5)
HOLD SPECIMEN: NORMAL
IMM GRANULOCYTES # BLD AUTO: 0.14 X10*3/UL (ref 0–0.5)
IMM GRANULOCYTES NFR BLD AUTO: 1 % (ref 0–0.9)
INR PPP: 3.9 (ref 0.9–1.1)
LYMPHOCYTES # BLD AUTO: 0.56 X10*3/UL (ref 0.8–3)
LYMPHOCYTES NFR BLD AUTO: 4 %
MCH RBC QN AUTO: 28.4 PG (ref 26–34)
MCHC RBC AUTO-ENTMCNC: 32.7 G/DL (ref 32–36)
MCV RBC AUTO: 87 FL (ref 80–100)
MONOCYTES # BLD AUTO: 0.44 X10*3/UL (ref 0.05–0.8)
MONOCYTES NFR BLD AUTO: 3.1 %
NEUTROPHILS # BLD AUTO: 12.92 X10*3/UL (ref 1.6–5.5)
NEUTROPHILS NFR BLD AUTO: 91.8 %
NRBC BLD-RTO: 0 /100 WBCS (ref 0–0)
PLATELET # BLD AUTO: 224 X10*3/UL (ref 150–450)
POTASSIUM SERPL-SCNC: 4.4 MMOL/L (ref 3.5–5.3)
PROTHROMBIN TIME: 44.4 SECONDS (ref 9.8–12.8)
RBC # BLD AUTO: 4.62 X10*6/UL (ref 4.5–5.9)
SODIUM SERPL-SCNC: 135 MMOL/L (ref 136–145)
WBC # BLD AUTO: 14.1 X10*3/UL (ref 4.4–11.3)

## 2023-12-13 PROCEDURE — 99233 SBSQ HOSP IP/OBS HIGH 50: CPT | Performed by: INTERNAL MEDICINE

## 2023-12-13 PROCEDURE — 36415 COLL VENOUS BLD VENIPUNCTURE: CPT | Performed by: STUDENT IN AN ORGANIZED HEALTH CARE EDUCATION/TRAINING PROGRAM

## 2023-12-13 PROCEDURE — 85025 COMPLETE CBC W/AUTO DIFF WBC: CPT | Performed by: STUDENT IN AN ORGANIZED HEALTH CARE EDUCATION/TRAINING PROGRAM

## 2023-12-13 PROCEDURE — 82374 ASSAY BLOOD CARBON DIOXIDE: CPT | Performed by: STUDENT IN AN ORGANIZED HEALTH CARE EDUCATION/TRAINING PROGRAM

## 2023-12-13 PROCEDURE — 85610 PROTHROMBIN TIME: CPT | Performed by: INTERNAL MEDICINE

## 2023-12-13 PROCEDURE — 99223 1ST HOSP IP/OBS HIGH 75: CPT | Performed by: NURSE PRACTITIONER

## 2023-12-13 PROCEDURE — 36415 COLL VENOUS BLD VENIPUNCTURE: CPT | Performed by: INTERNAL MEDICINE

## 2023-12-13 PROCEDURE — 2500000004 HC RX 250 GENERAL PHARMACY W/ HCPCS (ALT 636 FOR OP/ED): Performed by: STUDENT IN AN ORGANIZED HEALTH CARE EDUCATION/TRAINING PROGRAM

## 2023-12-13 PROCEDURE — 2500000001 HC RX 250 WO HCPCS SELF ADMINISTERED DRUGS (ALT 637 FOR MEDICARE OP): Performed by: STUDENT IN AN ORGANIZED HEALTH CARE EDUCATION/TRAINING PROGRAM

## 2023-12-13 PROCEDURE — 97165 OT EVAL LOW COMPLEX 30 MIN: CPT | Mod: GO

## 2023-12-13 PROCEDURE — 97161 PT EVAL LOW COMPLEX 20 MIN: CPT | Mod: GP | Performed by: PHYSICAL THERAPIST

## 2023-12-13 PROCEDURE — 1100000001 HC PRIVATE ROOM DAILY

## 2023-12-13 RX ADMIN — METHYLPREDNISOLONE SODIUM SUCCINATE 40 MG: 40 INJECTION, POWDER, FOR SOLUTION INTRAMUSCULAR; INTRAVENOUS at 13:43

## 2023-12-13 RX ADMIN — FUROSEMIDE 20 MG: 40 TABLET ORAL at 08:55

## 2023-12-13 RX ADMIN — FERROUS SULFATE TAB 325 MG (65 MG ELEMENTAL FE) 1 TABLET: 325 (65 FE) TAB at 16:29

## 2023-12-13 RX ADMIN — POTASSIUM CHLORIDE 10 MEQ: 750 TABLET, EXTENDED RELEASE ORAL at 08:55

## 2023-12-13 RX ADMIN — NYSTATIN 1 APPLICATION: 100000 POWDER TOPICAL at 09:00

## 2023-12-13 RX ADMIN — METHYLPREDNISOLONE SODIUM SUCCINATE 40 MG: 40 INJECTION, POWDER, FOR SOLUTION INTRAMUSCULAR; INTRAVENOUS at 21:14

## 2023-12-13 RX ADMIN — METOPROLOL TARTRATE 25 MG: 25 TABLET, FILM COATED ORAL at 21:15

## 2023-12-13 RX ADMIN — METHYLPREDNISOLONE SODIUM SUCCINATE 40 MG: 40 INJECTION, POWDER, FOR SOLUTION INTRAMUSCULAR; INTRAVENOUS at 08:58

## 2023-12-13 RX ADMIN — FERROUS SULFATE TAB 325 MG (65 MG ELEMENTAL FE) 1 TABLET: 325 (65 FE) TAB at 08:00

## 2023-12-13 RX ADMIN — ROSUVASTATIN CALCIUM 10 MG: 10 TABLET, FILM COATED ORAL at 08:55

## 2023-12-13 RX ADMIN — TRAZODONE HYDROCHLORIDE 100 MG: 50 TABLET ORAL at 21:15

## 2023-12-13 RX ADMIN — Medication 1000 MG: at 08:55

## 2023-12-13 RX ADMIN — METHYLPREDNISOLONE SODIUM SUCCINATE 40 MG: 40 INJECTION, POWDER, FOR SOLUTION INTRAMUSCULAR; INTRAVENOUS at 01:18

## 2023-12-13 RX ADMIN — METOPROLOL TARTRATE 25 MG: 25 TABLET, FILM COATED ORAL at 08:55

## 2023-12-13 ASSESSMENT — COGNITIVE AND FUNCTIONAL STATUS - GENERAL
DRESSING REGULAR LOWER BODY CLOTHING: A LOT
PERSONAL GROOMING: A LITTLE
HELP NEEDED FOR BATHING: A LOT
MOVING TO AND FROM BED TO CHAIR: A LOT
MOVING FROM LYING ON BACK TO SITTING ON SIDE OF FLAT BED WITH BEDRAILS: A LITTLE
PERSONAL GROOMING: A LOT
DRESSING REGULAR UPPER BODY CLOTHING: A LOT
WALKING IN HOSPITAL ROOM: A LOT
PERSONAL GROOMING: A LITTLE
DRESSING REGULAR UPPER BODY CLOTHING: A LITTLE
TOILETING: A LOT
CLIMB 3 TO 5 STEPS WITH RAILING: A LOT
TOILETING: A LOT
STANDING UP FROM CHAIR USING ARMS: A LOT
STANDING UP FROM CHAIR USING ARMS: A LOT
MOVING FROM LYING ON BACK TO SITTING ON SIDE OF FLAT BED WITH BEDRAILS: A LOT
EATING MEALS: A LITTLE
DAILY ACTIVITIY SCORE: 15
TURNING FROM BACK TO SIDE WHILE IN FLAT BAD: A LITTLE
TURNING FROM BACK TO SIDE WHILE IN FLAT BAD: A LOT
MOBILITY SCORE: 14
CLIMB 3 TO 5 STEPS WITH RAILING: TOTAL
MOVING FROM LYING ON BACK TO SITTING ON SIDE OF FLAT BED WITH BEDRAILS: A LOT
DRESSING REGULAR LOWER BODY CLOTHING: A LOT
DRESSING REGULAR LOWER BODY CLOTHING: A LOT
WALKING IN HOSPITAL ROOM: A LOT
TURNING FROM BACK TO SIDE WHILE IN FLAT BAD: A LOT
WALKING IN HOSPITAL ROOM: A LOT
DRESSING REGULAR UPPER BODY CLOTHING: A LOT
STANDING UP FROM CHAIR USING ARMS: A LOT
DAILY ACTIVITIY SCORE: 13
MOVING TO AND FROM BED TO CHAIR: A LOT
MOBILITY SCORE: 12
CLIMB 3 TO 5 STEPS WITH RAILING: A LOT
HELP NEEDED FOR BATHING: A LOT
DAILY ACTIVITIY SCORE: 16
MOVING TO AND FROM BED TO CHAIR: A LOT
MOBILITY SCORE: 11
HELP NEEDED FOR BATHING: A LOT
TOILETING: A LOT

## 2023-12-13 ASSESSMENT — PAIN SCALES - GENERAL
PAINLEVEL_OUTOF10: 0 - NO PAIN

## 2023-12-13 ASSESSMENT — PAIN - FUNCTIONAL ASSESSMENT
PAIN_FUNCTIONAL_ASSESSMENT: 0-10
PAIN_FUNCTIONAL_ASSESSMENT: 0-10

## 2023-12-13 ASSESSMENT — ACTIVITIES OF DAILY LIVING (ADL): BATHING_ASSISTANCE: MODERATE

## 2023-12-13 NOTE — CARE PLAN
The patient's goals for the shift include Relieve constipation    The clinical goals for the shift include safety      Problem: Pain - Adult  Goal: Verbalizes/displays adequate comfort level or baseline comfort level  Outcome: Progressing     Problem: Safety - Adult  Goal: Free from fall injury  Outcome: Progressing     Problem: Discharge Planning  Goal: Discharge to home or other facility with appropriate resources  Outcome: Progressing     Problem: Chronic Conditions and Co-morbidities  Goal: Patient's chronic conditions and co-morbidity symptoms are monitored and maintained or improved  Outcome: Progressing     Problem: Skin  Goal: Decreased wound size/increased tissue granulation at next dressing change  Outcome: Progressing  Goal: Participates in plan/prevention/treatment measures  Outcome: Progressing  Goal: Prevent/manage excess moisture  Outcome: Progressing  Goal: Prevent/minimize sheer/friction injuries  Outcome: Progressing  Goal: Promote/optimize nutrition  Outcome: Progressing  Goal: Promote skin healing  Outcome: Progressing     Problem: Fall/Injury  Goal: Not fall by end of shift  Outcome: Progressing  Goal: Be free from injury by end of the shift  Outcome: Progressing  Goal: Verbalize understanding of personal risk factors for fall in the hospital  Outcome: Progressing  Goal: Verbalize understanding of risk factor reduction measures to prevent injury from fall in the home  Outcome: Progressing  Goal: Use assistive devices by end of the shift  Outcome: Progressing  Goal: Pace activities to prevent fatigue by end of the shift  Outcome: Progressing     Problem: Pain  Goal: Takes deep breaths with improved pain control throughout the shift  Outcome: Progressing  Goal: Turns in bed with improved pain control throughout the shift  Outcome: Progressing  Goal: Walks with improved pain control throughout the shift  Outcome: Progressing  Goal: Performs ADL's with improved pain control throughout  shift  Outcome: Progressing  Goal: Participates in PT with improved pain control throughout the shift  Outcome: Progressing  Goal: Free from opioid side effects throughout the shift  Outcome: Progressing  Goal: Free from acute confusion related to pain meds throughout the shift  Outcome: Progressing

## 2023-12-13 NOTE — PROGRESS NOTES
"Vinicius Arriola is a 88 y.o. male on day 3 of admission presenting with COVID.    Subjective   Patient seen and examined.  Says that he feels better compared to yesterday, currently comfortable, denying any chest pain, cough or phlegm, nausea vomiting or fever or chills.  On oxygen.       Objective     Physical Exam  Constitutional:       General: He is not in acute distress.     Appearance: He is obese. He is not toxic-appearing.   HENT:      Head: Normocephalic and atraumatic.   Eyes:      Extraocular Movements: Extraocular movements intact.      Conjunctiva/sclera: Conjunctivae normal.   Cardiovascular:      Rate and Rhythm: Normal rate and regular rhythm.      Pulses: Normal pulses.   Pulmonary:      Comments: Decreased breathing sounds bilaterally, on nasal cannula oxygen  Abdominal:      General: There is distension.      Palpations: Abdomen is soft.      Tenderness: There is no abdominal tenderness.   Musculoskeletal:         General: Deformity present.      Cervical back: No rigidity or tenderness.   Neurological:      General: No focal deficit present.      Mental Status: He is alert.   Last Recorded Vitals  Blood pressure 138/77, pulse 85, temperature 36.7 °C (98.1 °F), resp. rate 22, height 1.727 m (5' 8\"), weight 115 kg (254 lb 3.1 oz), SpO2 92 %.  Intake/Output last 3 Shifts:  I/O last 3 completed shifts:  In: 800 (6.9 mL/kg) [P.O.:800]  Out: 500 (4.3 mL/kg) [Urine:500 (0.1 mL/kg/hr)]  Weight: 115.3 kg     Relevant Results                             Assessment/Plan   Principal Problem:    COVID    88-year-old male with past medical history of mesothelioma, sleep apnea, obesity, CKD admitted with acute hypoxemic respiratory failure most likely secondary to progression of mesothelioma, possible pneumonia, elevated creatinine, hyponatremia with positive COVID     His acute hypoxemic respiratory failure and chest imaging changes are not related to COVID  His mesothelioma has been progressively getting " worse  There is a possibility of superimposed pneumonia  Started on Levaquin, Pro-Kevin was 0.03 can possibly discontinue Levaquin  Continue DuoNebs, CPAP at night  Continue IV steroids for now  Will order home O2 eval  Palliative care has been consulted, did have a detailed goals of care discussion with the patient and wife and they seem to understand  Pulmonology on board  Creatinine has remained relatively stable, continue to monitor  INR is 3.9, keep off Coumadin, do daily INR  Continue statin, trazodone, metoprolol, iron supplementation  Seems like was started on p.o. Lasix by pulmonology  PT/OT eval  DVT prophylaxis        Ruiz Manzo MD

## 2023-12-13 NOTE — PROGRESS NOTES
Occupational Therapy    Evaluation    Patient Name: Vinicius Arriola  MRN: [id number]  Today's Date: 12/13/2023  Time Calculation  Start Time: 1238  Stop Time: 1253  Time Calculation (min): 15 min    Assessment  IP OT Assessment  Prognosis: Good  End of Session Communication: Bedside nurse  End of Session Patient Position: Bed, 3 rail up, Alarm on (Wife present, all needs in reach)  Plan:  Treatment Interventions: ADL retraining, Functional transfer training, UE strengthening/ROM, Endurance training, Cognitive reorientation, Patient/family training, Equipment evaluation/education, Compensatory technique education  OT Frequency: 3 times per week  OT Discharge Recommendations: Moderate intensity level of continued care  OT - OK to Discharge: Yes (To next level of care when deemed medically appropriate.)    Subjective   Current Problem:  1. COVID        2. Acute respiratory failure with hypoxia (CMS/HCC)        3. Abnormal chest CT        4. Supratherapeutic INR          General:  General  Referred By: Dr. Manzo (PT/OT 12/12)  Past Medical History Relevant to Rehab: includes: CPAP, JOVANNI, HTN, HLD, DVT, CAD, OA, A-fib, PPM, PE, polycythemia vera, mesothelioma, obesity SSS, BPH  Family/Caregiver Present: Yes (wife)  Co-Treatment: PT  Prior to Session Communication: Bedside nurse  Patient Position Received: Bed, 3 rail up, Alarm on  General Comment: Pt. is an 89yo who presented to [facility] on 12/10/2023 with increased SOB x 2 days with cough (+) emesis. O2 on RA in 80s. Pt. tested (+) for Covid.  CXR 12/10/2023 (+) increased L consolidation.  Chest CT (12/10) JESSIE and LLL consolidation  INR (12/13) 3.9 trending down  Dx: Covid, elevated INR, acute resp failure wtih hypoxia  Precautions:  Medical Precautions: Fall precautions, Oxygen therapy device and L/min (droplet plus precautions)  Vital Signs:  Heart Rate: 81  SpO2: 96 % (on O2 3L)  Pain:  Pain Assessment  Pain Assessment: 0-10  Pain Score: 0 - No pain    Objective    Cognition:  Orientation Level: Disoriented to time           Home Living:  Home Living Comments: Lives with wife in a 1 story home.  4 steps with handrail to enter.  Walk in shower with seat and safety bar. Independent with ADL.  Wife manages IADL.  Uses rollator in community and FWW in home.  1 recent fall.  Wife does the driving.   Prior Function:     IADL History:     ADL:  Eating Assistance: Independent  Grooming Assistance: Minimal  Bathing Assistance: Moderate  UE Dressing Assistance: Minimal  LE Dressing Assistance: Maximal  Toileting Assistance with Device: Maximal  Activity Tolerance:  Endurance: Decreased tolerance for upright activites  Bed Mobility/Transfers: Bed Mobility  Bed Mobility: Yes  Bed Mobility 1  Bed Mobility 1: Supine to sitting  Level of Assistance 1: Maximum assistance  Bed Mobility 2  Bed Mobility  2: Supine to sitting  Level of Assistance 2: Maximum assistance    Transfers  Transfer: Yes  Transfer 1  Transfer From 1: Bed to  Transfer to 1: Stand  Technique 1: Sit to stand  Transfer Device 1: Walker  Transfer Level of Assistance 1: Moderate assistance  Transfers 2  Transfer From 2: Stand to  Transfer to 2: Bed  Technique 2: Stand to sit  Transfer Device 2: Walker  Transfer Level of Assistance 2: Moderate assistance      Ambulation/Gait Training:  Ambulation/Gait Training  Ambulation/Gait Training Performed: Yes (Side steps at EOB with FWW, min assist.)  Sitting Balance:  Static Sitting Balance  Static Sitting-Comment/Number of Minutes: Good  Dynamic Sitting Balance  Dynamic Sitting-Comments: Good (-)  Standing Balance:  Static Standing Balance  Static Standing-Comment/Number of Minutes: Fair  Dynamic Standing Balance  Dynamic Standing-Comments: Fair (-)      Extremities: RUE   RUE :  (R shoulder active flexion ~ 120 degrees.  Distal RUE AROM WFL.  4/5 MMT.), LUE   LUE:  (L shoulder active flexion ~ 90 degrees.  Distal LUE AROM WFL.  L shoulder 4-/5.  Distal LUE 4/5.),  , and         Outcome Measures: [facility] Daily Activity  Putting on and taking off regular lower body clothing: A lot  Bathing (including washing, rinsing, drying): A lot  Putting on and taking off regular upper body clothing: A little  Toileting, which includes using toilet, bedpan or urinal: A lot  Taking care of personal grooming such as brushing teeth: A little  Eating Meals: None  Daily Activity - Total Score: 16      Education Documentation  ADL Training, taught by Mary Garnett OT at 12/13/2023  2:04 PM.  Learner: Patient  Readiness: Acceptance  Method: Explanation  Response: Verbalizes Understanding, Needs Reinforcement    Education Comments  No comments found.      Goals:   Encounter Problems       Encounter Problems (Active)       OT Goals       Min assist bed mobility.       Start:  12/13/23    Expected End:  12/27/23            CGA sit/stand, bed/chair/commode with FWW.        Start:  12/13/23    Expected End:  12/27/23            Fair (+) dynamic standing balance for ADL.        Start:  12/13/23    Expected End:  12/27/23            Tolerate 8 mins light functional activity with stable vitals and without rest break.        Start:  12/13/23    Expected End:  12/27/23

## 2023-12-13 NOTE — PROGRESS NOTES
"Vinicius Arriola is a 88 y.o. male on day 3 of admission presenting with COVID. And hypoxic respiratory failure.  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  Patient clinically improving for discharge.  Palliative care consult done and according to the note wife and patient agreed for DNR/DNI.    Subjective   Patient states she is feeling better     Objective   Patient clinically improved.  ROS  Review of Systems   Review of Systems   Constitutional:  Positive for appetite change and fatigue.   HENT:  Positive for congestion.    Eyes: Negative.    Respiratory:  Positive for cough, shortness of breath and wheezing.    Gastrointestinal:  Positive for nausea and vomiting.   Endocrine: Negative.    Genitourinary: Negative.    Musculoskeletal:  Positive for arthralgias, joint swelling and myalgias.   Skin: Negative.    Allergic/Immunologic: Negative.    Neurological:  Positive for weakness.   Psychiatric/Behavioral:  Positive for sleep disturbance.    All other systems reviewed and are negative.  Vital Signs  Blood pressure 147/82, pulse 70, temperature 37.3 °C (99.1 °F), resp. rate 22, height 1.727 m (5' 8\"), weight 115 kg (254 lb 3.1 oz), SpO2 96 %.    Physical Exam   Vitals reviewed.   Constitutional:       Appearance: Normal appearance. He is obese.   HENT:      Head: Normocephalic and atraumatic.      Right Ear: Tympanic membrane and external ear normal.      Left Ear: Tympanic membrane and external ear normal.      Nose: Congestion present.      Mouth/Throat:      Mouth: Mucous membranes are dry.   Eyes:      Extraocular Movements: Extraocular movements intact.      Pupils: Pupils are equal, round, and reactive to light.   Cardiovascular:      Rate and Rhythm: Normal rate.      Pulses: Normal pulses.      Heart sounds: Normal heart sounds.   Pulmonary:      Breath sounds: Wheezing and rales present.   Abdominal:      Palpations: Abdomen is soft.   Musculoskeletal:         General: Normal range of " motion.      Cervical back: Normal range of motion.   Skin:     General: Skin is dry.      Capillary Refill: Capillary refill takes 2 to 3 seconds.   Neurological:      General: No focal deficit present.      Mental Status: He is alert and oriented to person, place, and time.   Psychiatric:         Mood and Affect: Mood normal.      Oxygen Therapy  SpO2: 96 %  Medical Gas Therapy: Supplemental oxygen  O2 Delivery Method: Nasal cannula (3L)       Intake/Output  I/O last 3 completed shifts:  In: 800 (6.9 mL/kg) [P.O.:800]  Out: 500 (4.3 mL/kg) [Urine:500 (0.1 mL/kg/hr)]  Weight: 115.3 kg     Lines and Tubes:  Peripheral IV 12/10/23 20 G Right Antecubital (Active)   Placement Date/Time: 12/10/23 1127   Size (Gauge): 20 G  Orientation: Right  Location: Antecubital   Number of days: 3       Results for orders placed or performed during the hospital encounter of 12/10/23 (from the past 96 hour(s))   CBC and Auto Differential   Result Value Ref Range    WBC 11.5 (H) 4.4 - 11.3 x10*3/uL    nRBC 0.0 0.0 - 0.0 /100 WBCs    RBC 4.61 4.50 - 5.90 x10*6/uL    Hemoglobin 13.2 (L) 13.5 - 17.5 g/dL    Hematocrit 41.4 41.0 - 52.0 %    MCV 90 80 - 100 fL    MCH 28.6 26.0 - 34.0 pg    MCHC 31.9 (L) 32.0 - 36.0 g/dL    RDW 15.6 (H) 11.5 - 14.5 %    Platelets 218 150 - 450 x10*3/uL    Neutrophils % 83.7 40.0 - 80.0 %    Immature Granulocytes %, Automated 0.5 0.0 - 0.9 %    Lymphocytes % 5.3 13.0 - 44.0 %    Monocytes % 9.5 2.0 - 10.0 %    Eosinophils % 0.8 0.0 - 6.0 %    Basophils % 0.2 0.0 - 2.0 %    Neutrophils Absolute 9.64 (H) 1.60 - 5.50 x10*3/uL    Immature Granulocytes Absolute, Automated 0.06 0.00 - 0.50 x10*3/uL    Lymphocytes Absolute 0.61 (L) 0.80 - 3.00 x10*3/uL    Monocytes Absolute 1.09 (H) 0.05 - 0.80 x10*3/uL    Eosinophils Absolute 0.09 0.00 - 0.40 x10*3/uL    Basophils Absolute 0.02 0.00 - 0.10 x10*3/uL   Comprehensive Metabolic Panel   Result Value Ref Range    Glucose 95 74 - 99 mg/dL    Sodium 132 (L) 136 - 145  mmol/L    Potassium 4.5 3.5 - 5.3 mmol/L    Chloride 99 98 - 107 mmol/L    Bicarbonate 27 21 - 32 mmol/L    Anion Gap 11 10 - 20 mmol/L    Urea Nitrogen 19 6 - 23 mg/dL    Creatinine 1.36 (H) 0.50 - 1.30 mg/dL    eGFR 51 (L) >60 mL/min/1.73m*2    Calcium 9.3 8.6 - 10.3 mg/dL    Albumin 3.8 3.4 - 5.0 g/dL    Alkaline Phosphatase 63 33 - 136 U/L    Total Protein 7.1 6.4 - 8.2 g/dL    AST 15 9 - 39 U/L    Bilirubin, Total 0.4 0.0 - 1.2 mg/dL    ALT 11 10 - 52 U/L   Magnesium   Result Value Ref Range    Magnesium 1.94 1.60 - 2.40 mg/dL   Protime-INR   Result Value Ref Range    Protime 50.3 (H) 9.8 - 12.8 seconds    INR 4.4 (H) 0.9 - 1.1   B-Type Natriuretic Peptide   Result Value Ref Range     (H) 0 - 99 pg/mL   Troponin I, High Sensitivity, Initial   Result Value Ref Range    Troponin I, High Sensitivity 15 0 - 20 ng/L   Sars-CoV-2 and Influenza A/B PCR   Result Value Ref Range    Flu A Result Not Detected Not Detected    Flu B Result Not Detected Not Detected    Coronavirus 2019, PCR Detected (A) Not Detected   Troponin, High Sensitivity, 1 Hour   Result Value Ref Range    Troponin I, High Sensitivity 17 0 - 20 ng/L   Procalcitonin   Result Value Ref Range    Procalcitonin 0.03 <=0.07 ng/mL   SST TOP   Result Value Ref Range    Extra Tube Hold for add-ons.    Protime-INR   Result Value Ref Range    Protime 53.8 (H) 9.8 - 12.8 seconds    INR 4.7 (H) 0.9 - 1.1   Basic metabolic panel   Result Value Ref Range    Glucose 150 (H) 74 - 99 mg/dL    Sodium 133 (L) 136 - 145 mmol/L    Potassium 4.7 3.5 - 5.3 mmol/L    Chloride 101 98 - 107 mmol/L    Bicarbonate 26 21 - 32 mmol/L    Anion Gap 11 10 - 20 mmol/L    Urea Nitrogen 25 (H) 6 - 23 mg/dL    Creatinine 1.44 (H) 0.50 - 1.30 mg/dL    eGFR 47 (L) >60 mL/min/1.73m*2    Calcium 9.1 8.6 - 10.3 mg/dL   CBC and Auto Differential   Result Value Ref Range    WBC 10.3 4.4 - 11.3 x10*3/uL    nRBC 0.0 0.0 - 0.0 /100 WBCs    RBC 4.48 (L) 4.50 - 5.90 x10*6/uL    Hemoglobin 12.7  (L) 13.5 - 17.5 g/dL    Hematocrit 39.2 (L) 41.0 - 52.0 %    MCV 88 80 - 100 fL    MCH 28.3 26.0 - 34.0 pg    MCHC 32.4 32.0 - 36.0 g/dL    RDW 15.6 (H) 11.5 - 14.5 %    Platelets 215 150 - 450 x10*3/uL    Neutrophils % 92.5 40.0 - 80.0 %    Immature Granulocytes %, Automated 0.5 0.0 - 0.9 %    Lymphocytes % 5.5 13.0 - 44.0 %    Monocytes % 1.4 2.0 - 10.0 %    Eosinophils % 0.0 0.0 - 6.0 %    Basophils % 0.1 0.0 - 2.0 %    Neutrophils Absolute 9.52 (H) 1.60 - 5.50 x10*3/uL    Immature Granulocytes Absolute, Automated 0.05 0.00 - 0.50 x10*3/uL    Lymphocytes Absolute 0.57 (L) 0.80 - 3.00 x10*3/uL    Monocytes Absolute 0.14 0.05 - 0.80 x10*3/uL    Eosinophils Absolute 0.00 0.00 - 0.40 x10*3/uL    Basophils Absolute 0.01 0.00 - 0.10 x10*3/uL   Basic metabolic panel   Result Value Ref Range    Glucose 145 (H) 74 - 99 mg/dL    Sodium 137 136 - 145 mmol/L    Potassium 4.9 3.5 - 5.3 mmol/L    Chloride 102 98 - 107 mmol/L    Bicarbonate 30 21 - 32 mmol/L    Anion Gap 10 10 - 20 mmol/L    Urea Nitrogen 32 (H) 6 - 23 mg/dL    Creatinine 1.36 (H) 0.50 - 1.30 mg/dL    eGFR 51 (L) >60 mL/min/1.73m*2    Calcium 9.6 8.6 - 10.3 mg/dL   CBC and Auto Differential   Result Value Ref Range    WBC 16.4 (H) 4.4 - 11.3 x10*3/uL    nRBC 0.0 0.0 - 0.0 /100 WBCs    RBC 4.81 4.50 - 5.90 x10*6/uL    Hemoglobin 13.6 13.5 - 17.5 g/dL    Hematocrit 42.0 41.0 - 52.0 %    MCV 87 80 - 100 fL    MCH 28.3 26.0 - 34.0 pg    MCHC 32.4 32.0 - 36.0 g/dL    RDW 15.5 (H) 11.5 - 14.5 %    Platelets 242 150 - 450 x10*3/uL    Neutrophils % 90.7 40.0 - 80.0 %    Immature Granulocytes %, Automated 0.5 0.0 - 0.9 %    Lymphocytes % 5.1 13.0 - 44.0 %    Monocytes % 3.6 2.0 - 10.0 %    Eosinophils % 0.0 0.0 - 6.0 %    Basophils % 0.1 0.0 - 2.0 %    Neutrophils Absolute 14.88 (H) 1.60 - 5.50 x10*3/uL    Immature Granulocytes Absolute, Automated 0.08 0.00 - 0.50 x10*3/uL    Lymphocytes Absolute 0.84 0.80 - 3.00 x10*3/uL    Monocytes Absolute 0.59 0.05 - 0.80 x10*3/uL     Eosinophils Absolute 0.00 0.00 - 0.40 x10*3/uL    Basophils Absolute 0.02 0.00 - 0.10 x10*3/uL   Protime-INR   Result Value Ref Range    Protime 52.1 (H) 9.8 - 12.8 seconds    INR 4.5 (H) 0.9 - 1.1   SST TOP   Result Value Ref Range    Extra Tube Hold for add-ons.    SST TOP   Result Value Ref Range    Extra Tube Hold for add-ons.    Basic metabolic panel   Result Value Ref Range    Glucose 153 (H) 74 - 99 mg/dL    Sodium 135 (L) 136 - 145 mmol/L    Potassium 4.4 3.5 - 5.3 mmol/L    Chloride 102 98 - 107 mmol/L    Bicarbonate 28 21 - 32 mmol/L    Anion Gap 9 (L) 10 - 20 mmol/L    Urea Nitrogen 33 (H) 6 - 23 mg/dL    Creatinine 1.28 0.50 - 1.30 mg/dL    eGFR 55 (L) >60 mL/min/1.73m*2    Calcium 9.0 8.6 - 10.3 mg/dL   CBC and Auto Differential   Result Value Ref Range    WBC 14.1 (H) 4.4 - 11.3 x10*3/uL    nRBC 0.0 0.0 - 0.0 /100 WBCs    RBC 4.62 4.50 - 5.90 x10*6/uL    Hemoglobin 13.1 (L) 13.5 - 17.5 g/dL    Hematocrit 40.1 (L) 41.0 - 52.0 %    MCV 87 80 - 100 fL    MCH 28.4 26.0 - 34.0 pg    MCHC 32.7 32.0 - 36.0 g/dL    RDW 15.6 (H) 11.5 - 14.5 %    Platelets 224 150 - 450 x10*3/uL    Neutrophils % 91.8 40.0 - 80.0 %    Immature Granulocytes %, Automated 1.0 (H) 0.0 - 0.9 %    Lymphocytes % 4.0 13.0 - 44.0 %    Monocytes % 3.1 2.0 - 10.0 %    Eosinophils % 0.0 0.0 - 6.0 %    Basophils % 0.1 0.0 - 2.0 %    Neutrophils Absolute 12.92 (H) 1.60 - 5.50 x10*3/uL    Immature Granulocytes Absolute, Automated 0.14 0.00 - 0.50 x10*3/uL    Lymphocytes Absolute 0.56 (L) 0.80 - 3.00 x10*3/uL    Monocytes Absolute 0.44 0.05 - 0.80 x10*3/uL    Eosinophils Absolute 0.00 0.00 - 0.40 x10*3/uL    Basophils Absolute 0.02 0.00 - 0.10 x10*3/uL   Protime-INR   Result Value Ref Range    Protime 44.4 (H) 9.8 - 12.8 seconds    INR 3.9 (H) 0.9 - 1.1      Relevant Results  Recent Results (from the past 24 hour(s))   [facility]    Collection Time: 12/13/23  4:46 AM   Result Value Ref Range    Extra Tube Hold for add-ons.    Basic metabolic  "panel    Collection Time: 12/13/23  4:48 AM   Result Value Ref Range    Glucose 153 (H) 74 - 99 mg/dL    Sodium 135 (L) 136 - 145 mmol/L    Potassium 4.4 3.5 - 5.3 mmol/L    Chloride 102 98 - 107 mmol/L    Bicarbonate 28 21 - 32 mmol/L    Anion Gap 9 (L) 10 - 20 mmol/L    Urea Nitrogen 33 (H) 6 - 23 mg/dL    Creatinine 1.28 0.50 - 1.30 mg/dL    eGFR 55 (L) >60 mL/min/1.73m*2    Calcium 9.0 8.6 - 10.3 mg/dL   CBC and Auto Differential    Collection Time: 12/13/23  4:48 AM   Result Value Ref Range    WBC 14.1 (H) 4.4 - 11.3 x10*3/uL    nRBC 0.0 0.0 - 0.0 /100 WBCs    RBC 4.62 4.50 - 5.90 x10*6/uL    Hemoglobin 13.1 (L) 13.5 - 17.5 g/dL    Hematocrit 40.1 (L) 41.0 - 52.0 %    MCV 87 80 - 100 fL    MCH 28.4 26.0 - 34.0 pg    MCHC 32.7 32.0 - 36.0 g/dL    RDW 15.6 (H) 11.5 - 14.5 %    Platelets 224 150 - 450 x10*3/uL    Neutrophils % 91.8 40.0 - 80.0 %    Immature Granulocytes %, Automated 1.0 (H) 0.0 - 0.9 %    Lymphocytes % 4.0 13.0 - 44.0 %    Monocytes % 3.1 2.0 - 10.0 %    Eosinophils % 0.0 0.0 - 6.0 %    Basophils % 0.1 0.0 - 2.0 %    Neutrophils Absolute 12.92 (H) 1.60 - 5.50 x10*3/uL    Immature Granulocytes Absolute, Automated 0.14 0.00 - 0.50 x10*3/uL    Lymphocytes Absolute 0.56 (L) 0.80 - 3.00 x10*3/uL    Monocytes Absolute 0.44 0.05 - 0.80 x10*3/uL    Eosinophils Absolute 0.00 0.00 - 0.40 x10*3/uL    Basophils Absolute 0.02 0.00 - 0.10 x10*3/uL   Protime-INR    Collection Time: 12/13/23  4:48 AM   Result Value Ref Range    Protime 44.4 (H) 9.8 - 12.8 seconds    INR 3.9 (H) 0.9 - 1.1      CT chest wo IV contrast    Result Date: 12/10/2023  STUDY: CT Chest without IV Contrast; 12/10/2023, 2:54PM. INDICATION: Follow up to abnormal chest radiograph, \"Persistent dense consolidation throughout the left chest.  Mesothelioma. COMPARISON: Chest x-ray 12/10/2023 and 2/17/2023.  CT chest 3/21/2023 and 6/14/2023 ACCESSION NUMBER(S): [id number] ORDERING CLINICIAN: ELIEL MENDIOLA TECHNIQUE:  CT of the chest was performed " without contrast.  Automated mA/kV exposure control was utilized and patient examination was performed in strict accordance with principles of ALARA. FINDINGS: MEDIASTINUM: Shift of the mediastinal structures towards the left hemithorax. Cardiac size is stable.  Pacemaker leads are present.  The unenhanced thoracic aorta shows no evidence of aneurysm.  Small mediastinal lymph nodes.  Largest lymph node is a prevascular AP window lymph node on image 24 series 201 measuring 1.3 cm and unchanged. LUNGS/PLEURA: Very small left pleural effusion.  The airways are patent. The right lung is clear.  Diffuse airspace densities with air bronchograms left hemithorax. LYMPH NODES: As above. UPPER ABDOMEN: Cholelithiasis. BONES: There are no acute fractures.  No suspicious bony lesions.    There are chronic consolidative changes throughout the left upper lobe and left lower lobe.  Overall the consolidative changes have progressed slightly since CT chest 3/21/2023 and 6/14/2023.  There is chronic volume loss left hemithorax with shift of the mediastinal structures towards the left.  The patient has a history of mesothelioma.  The chronic consolidative changes throughout the left lung could be due to pneumonitis if the patient has had prior radiation therapy.  Infection/pneumonia cannot be completely excluded.  The right lung is clear. Mild mediastinal lymphadenopathy is stable. Cholelithiasis. Signed by Maicol Tinoco MD    XR chest 1 view    Result Date: 12/10/2023  STUDY: Chest Radiograph;  12/10/2023 12:18PM INDICATION: Shortness of breath. COMPARISON: XR Chest 02/17/2023 ACCESSION NUMBER(S): [id number] ORDERING CLINICIAN: ELIEL MENDIOLA TECHNIQUE:  Frontal chest (two images) was obtained at 12:17 hours. FINDINGS: Dense consolidation persists throughout the left chest. Right chest remains clear. Mediastinal structures are shifted to the left. Right pacemaker remains in place. There is no pneumothorax.    Persistent dense  "consolidation throughout the left chest. Remainder as above. Signed by Angel Rojas MD    ECG 12 lead (Clinic Performed)    Result Date: 12/6/2023  EKG performed today shows atrial paced rhythm rate of 66 bpm rotation 74 ms QT corrected 423 ms.  Rhythm strip shows the same pattern.    Radiology:  CT chest wo IV contrast    Result Date: 12/10/2023  STUDY: CT Chest without IV Contrast; 12/10/2023, 2:54PM. INDICATION: Follow up to abnormal chest radiograph, \"Persistent dense consolidation throughout the left chest.  Mesothelioma. COMPARISON: Chest x-ray 12/10/2023 and 2/17/2023.  CT chest 3/21/2023 and 6/14/2023 ACCESSION NUMBER(S): [id number] ORDERING CLINICIAN: ELIEL MENDIOLA TECHNIQUE:  CT of the chest was performed without contrast.  Automated mA/kV exposure control was utilized and patient examination was performed in strict accordance with principles of ALARA. FINDINGS: MEDIASTINUM: Shift of the mediastinal structures towards the left hemithorax. Cardiac size is stable.  Pacemaker leads are present.  The unenhanced thoracic aorta shows no evidence of aneurysm.  Small mediastinal lymph nodes.  Largest lymph node is a prevascular AP window lymph node on image 24 series 201 measuring 1.3 cm and unchanged. LUNGS/PLEURA: Very small left pleural effusion.  The airways are patent. The right lung is clear.  Diffuse airspace densities with air bronchograms left hemithorax. LYMPH NODES: As above. UPPER ABDOMEN: Cholelithiasis. BONES: There are no acute fractures.  No suspicious bony lesions.    There are chronic consolidative changes throughout the left upper lobe and left lower lobe.  Overall the consolidative changes have progressed slightly since CT chest 3/21/2023 and 6/14/2023.  There is chronic volume loss left hemithorax with shift of the mediastinal structures towards the left.  The patient has a history of mesothelioma.  The chronic consolidative changes throughout the left lung could be due to pneumonitis if the " patient has had prior radiation therapy.  Infection/pneumonia cannot be completely excluded.  The right lung is clear. Mild mediastinal lymphadenopathy is stable. Cholelithiasis. Signed by Maicol Tinoco MD    XR chest 1 view    Result Date: 12/10/2023  STUDY: Chest Radiograph;  12/10/2023 12:18PM INDICATION: Shortness of breath. COMPARISON: XR Chest 02/17/2023 ACCESSION NUMBER(S): [id number] ORDERING CLINICIAN: ELIEL MENDIOLA TECHNIQUE:  Frontal chest (two images) was obtained at 12:17 hours. FINDINGS: Dense consolidation persists throughout the left chest. Right chest remains clear. Mediastinal structures are shifted to the left. Right pacemaker remains in place. There is no pneumothorax.    Persistent dense consolidation throughout the left chest. Remainder as above. Signed by Angel Rojas MD     Current Facility-Administered Medications:     acetaminophen (Tylenol) tablet 650 mg, 650 mg, oral, q4h PRN **OR** acetaminophen (Tylenol) oral liquid 650 mg, 650 mg, nasogastric tube, q4h PRN **OR** acetaminophen (Tylenol) suppository 650 mg, 650 mg, rectal, q4h PRN, Abraham Pugh MD    acetaminophen (Tylenol) tablet 650 mg, 650 mg, oral, q4h PRN, 650 mg at 12/10/23 2247 **OR** acetaminophen (Tylenol) oral liquid 650 mg, 650 mg, oral, q4h PRN **OR** acetaminophen (Tylenol) suppository 650 mg, 650 mg, rectal, q4h PRN, Abraham Pugh MD    ascorbic acid (Vitamin C) tablet 1,000 mg, 1,000 mg, oral, Daily, Abraham Pugh MD, 1,000 mg at 12/13/23 0855    benzocaine-menthol (Cepastat Sore Throat) 15-3.6 mg lozenge 1 lozenge, 1 lozenge, Mouth/Throat, q2h PRN, Abraham Pugh MD    dextromethorphan-guaifenesin (Robitussin DM)  mg/5 mL oral liquid 5 mL, 5 mL, oral, q4h PRN, Abraham Pugh MD    ferrous sulfate (325 mg ferrous sulfate) tablet 1 tablet, 65 mg of iron, oral, BID with meals, Abraham Pugh MD, 1 tablet at 12/13/23 4459    furosemide (Lasix) tablet 20 mg, 20 mg, oral, Daily, Abraham Pugh,  MD, 20 mg at 12/13/23 0855    ipratropium-albuteroL (Duo-Neb) 0.5-2.5 mg/3 mL nebulizer solution 3 mL, 3 mL, nebulization, q2h PRN, Abraham Pugh MD    levoFLOXacin (Levaquin)  mg, 750 mg, intravenous, q48h, Abraham Pugh MD, Stopped at 12/12/23 2153    lubricating eye drops ophthalmic solution 1 drop, 1 drop, Both Eyes, TID PRN, Abraham Pugh MD    melatonin tablet 5 mg, 5 mg, oral, Nightly PRN, Abraham Pugh MD    methylPREDNISolone sod succinate (PF) (SOLU-Medrol) 40 mg/mL injection 40 mg, 40 mg, intravenous, q6h, Abraham Pugh MD, 40 mg at 12/13/23 1343    metoprolol tartrate (Lopressor) tablet 25 mg, 25 mg, oral, BID, Abraham Pugh MD, 25 mg at 12/13/23 0855    nitroglycerin (Nitrostat) SL tablet 0.4 mg, 0.4 mg, sublingual, q5 min PRN, Abraham Pugh MD    nystatin (Mycostatin) 100,000 unit/gram powder 1 Application, 1 Application, Topical, BID, Vonnie Fitzpatrick, APRN-CNP, 1 Application at 12/13/23 0900    ondansetron (Zofran) tablet 4 mg, 4 mg, oral, q8h PRN **OR** ondansetron (Zofran) injection 4 mg, 4 mg, intravenous, q8h PRN, Abraham Pugh MD    polyethylene glycol (Glycolax, Miralax) packet 17 g, 17 g, oral, Daily, Abraham Pugh MD, 17 g at 12/11/23 0938    potassium chloride CR (Klor-Con) ER tablet 10 mEq, 10 mEq, oral, Daily, Abraham Pugh MD, 10 mEq at 12/13/23 0855    rosuvastatin (Crestor) tablet 10 mg, 10 mg, oral, Daily, Abraham Pugh MD, 10 mg at 12/13/23 0855    traZODone (Desyrel) tablet 100 mg, 100 mg, oral, Nightly, Abraham Pugh MD, 100 mg at 12/12/23 2023   Principal Problem:    COVID      Assessment/Plan      Acute hypoxic respiratory failure continue oxygen keeping saturation more than 90% patient on 3 L oxygen nasal cannula.  Patient O2 saturation on room air was 84% on arrival.  And at home as well.  History of mesothelioma s/p left VATS and pleurodesis and s/p radiation therapy for mesothelioma.  Mesothelioma getting worse for many  months.  Patient and family aware and discussed with them.  Consider palliative care.  Case discussed with patient and wife.  And ER physician.  Hospitalist updated about this mesothelioma diagnosis.  Consider changing the CODE STATUS to DNR.  Left-sided consolidation and volume loss is secondary to mesothelioma, atelectasis, infiltrate cannot be ruled out agree with antibiotic.  Covid-19 infection.  COPD exacerbation in a former smoker.  Continue bronchodilator as ordered.  Sleep apnea patient on BiPAP at home.  Morbid obesity patient advised reduce weight by diet exercise.  Hypertension.  Dyslipidemia.  Coronary disease PTCA in the remote past.  History of pulmonary embolism and DVT in the past.  Paroxysmal atrial fibrillation history.  Sick sinus syndrome and pacemaker placement.  Chronic kidney disease.  BPH.  EtOH counseling done.  DVT prophylaxis.  PT OT.  P.o. diet.  Palliative care consulted.  Patient clinically improving.  Palliative care patient wants for home.  Wife is sick at home.  Today wife visited the patient and palliative nurse practitioner discussed the palliative care and made the patient DNR/DNI after discussing with the patient and wife.    Luis Villasenor MD

## 2023-12-13 NOTE — PROGRESS NOTES
"Vinicius Arriola is a 88 y.o. male on day 2 of admission presenting with COVID.  And hypoxic respiratory failure.  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    Subjective   Patient states he is feeling better.     Objective   Patient stable on 3 L nasal cannula oxygen.  ROS  Review of Systems   Constitutional:  Positive for appetite change and fatigue.   HENT:  Positive for congestion.    Eyes: Negative.    Respiratory:  Positive for cough, shortness of breath and wheezing.    Gastrointestinal:  Positive for nausea and vomiting.   Endocrine: Negative.    Genitourinary: Negative.    Musculoskeletal:  Positive for arthralgias, joint swelling and myalgias.   Skin: Negative.    Allergic/Immunologic: Negative.    Neurological:  Positive for weakness.   Psychiatric/Behavioral:  Positive for sleep disturbance.    All other systems reviewed and are negative.  Vital Signs  Blood pressure 138/79, pulse 69, temperature 37 °C (98.6 °F), temperature source Temporal, resp. rate 20, height 1.727 m (5' 8\"), weight 115 kg (254 lb 3.1 oz), SpO2 94 %.    Physical Exam   Vitals reviewed.   Constitutional:       Appearance: Normal appearance. He is obese.   HENT:      Head: Normocephalic and atraumatic.      Right Ear: Tympanic membrane and external ear normal.      Left Ear: Tympanic membrane and external ear normal.      Nose: Congestion present.      Mouth/Throat:      Mouth: Mucous membranes are dry.   Eyes:      Extraocular Movements: Extraocular movements intact.      Pupils: Pupils are equal, round, and reactive to light.   Cardiovascular:      Rate and Rhythm: Normal rate.      Pulses: Normal pulses.      Heart sounds: Normal heart sounds.   Pulmonary:      Breath sounds: Wheezing and rales present.   Abdominal:      Palpations: Abdomen is soft.   Musculoskeletal:         General: Normal range of motion.      Cervical back: Normal range of motion.   Skin:     General: Skin is dry.      Capillary Refill: " Capillary refill takes 2 to 3 seconds.   Neurological:      General: No focal deficit present.      Mental Status: He is alert and oriented to person, place, and time.   Psychiatric:         Mood and Affect: Mood normal.         Behavior: Behavi  Oxygen Therapy  SpO2: 94 %  Medical Gas Therapy: Supplemental oxygen  O2 Delivery Method: Nasal cannula (3L)       Intake/Output  I/O last 3 completed shifts:  In: 950 (8.2 mL/kg) [P.O.:800; IV Piggyback:150]  Out: 501 (4.3 mL/kg) [Urine:500 (0.1 mL/kg/hr); Stool:1]  Weight: 115.3 kg     Lines and Tubes:  Peripheral IV 12/10/23 20 G Right Antecubital (Active)   Placement Date/Time: 12/10/23 1127   Size (Gauge): 20 G  Orientation: Right  Location: Antecubital   Number of days: 2       Results for orders placed or performed during the hospital encounter of 12/10/23 (from the past 96 hour(s))   CBC and Auto Differential   Result Value Ref Range    WBC 11.5 (H) 4.4 - 11.3 x10*3/uL    nRBC 0.0 0.0 - 0.0 /100 WBCs    RBC 4.61 4.50 - 5.90 x10*6/uL    Hemoglobin 13.2 (L) 13.5 - 17.5 g/dL    Hematocrit 41.4 41.0 - 52.0 %    MCV 90 80 - 100 fL    MCH 28.6 26.0 - 34.0 pg    MCHC 31.9 (L) 32.0 - 36.0 g/dL    RDW 15.6 (H) 11.5 - 14.5 %    Platelets 218 150 - 450 x10*3/uL    Neutrophils % 83.7 40.0 - 80.0 %    Immature Granulocytes %, Automated 0.5 0.0 - 0.9 %    Lymphocytes % 5.3 13.0 - 44.0 %    Monocytes % 9.5 2.0 - 10.0 %    Eosinophils % 0.8 0.0 - 6.0 %    Basophils % 0.2 0.0 - 2.0 %    Neutrophils Absolute 9.64 (H) 1.60 - 5.50 x10*3/uL    Immature Granulocytes Absolute, Automated 0.06 0.00 - 0.50 x10*3/uL    Lymphocytes Absolute 0.61 (L) 0.80 - 3.00 x10*3/uL    Monocytes Absolute 1.09 (H) 0.05 - 0.80 x10*3/uL    Eosinophils Absolute 0.09 0.00 - 0.40 x10*3/uL    Basophils Absolute 0.02 0.00 - 0.10 x10*3/uL   Comprehensive Metabolic Panel   Result Value Ref Range    Glucose 95 74 - 99 mg/dL    Sodium 132 (L) 136 - 145 mmol/L    Potassium 4.5 3.5 - 5.3 mmol/L    Chloride 99 98 - 107  mmol/L    Bicarbonate 27 21 - 32 mmol/L    Anion Gap 11 10 - 20 mmol/L    Urea Nitrogen 19 6 - 23 mg/dL    Creatinine 1.36 (H) 0.50 - 1.30 mg/dL    eGFR 51 (L) >60 mL/min/1.73m*2    Calcium 9.3 8.6 - 10.3 mg/dL    Albumin 3.8 3.4 - 5.0 g/dL    Alkaline Phosphatase 63 33 - 136 U/L    Total Protein 7.1 6.4 - 8.2 g/dL    AST 15 9 - 39 U/L    Bilirubin, Total 0.4 0.0 - 1.2 mg/dL    ALT 11 10 - 52 U/L   Magnesium   Result Value Ref Range    Magnesium 1.94 1.60 - 2.40 mg/dL   Protime-INR   Result Value Ref Range    Protime 50.3 (H) 9.8 - 12.8 seconds    INR 4.4 (H) 0.9 - 1.1   B-Type Natriuretic Peptide   Result Value Ref Range     (H) 0 - 99 pg/mL   Troponin I, High Sensitivity, Initial   Result Value Ref Range    Troponin I, High Sensitivity 15 0 - 20 ng/L   Sars-CoV-2 and Influenza A/B PCR   Result Value Ref Range    Flu A Result Not Detected Not Detected    Flu B Result Not Detected Not Detected    Coronavirus 2019, PCR Detected (A) Not Detected   Troponin, High Sensitivity, 1 Hour   Result Value Ref Range    Troponin I, High Sensitivity 17 0 - 20 ng/L   Procalcitonin   Result Value Ref Range    Procalcitonin 0.03 <=0.07 ng/mL   SST TOP   Result Value Ref Range    Extra Tube Hold for add-ons.    Protime-INR   Result Value Ref Range    Protime 53.8 (H) 9.8 - 12.8 seconds    INR 4.7 (H) 0.9 - 1.1   Basic metabolic panel   Result Value Ref Range    Glucose 150 (H) 74 - 99 mg/dL    Sodium 133 (L) 136 - 145 mmol/L    Potassium 4.7 3.5 - 5.3 mmol/L    Chloride 101 98 - 107 mmol/L    Bicarbonate 26 21 - 32 mmol/L    Anion Gap 11 10 - 20 mmol/L    Urea Nitrogen 25 (H) 6 - 23 mg/dL    Creatinine 1.44 (H) 0.50 - 1.30 mg/dL    eGFR 47 (L) >60 mL/min/1.73m*2    Calcium 9.1 8.6 - 10.3 mg/dL   CBC and Auto Differential   Result Value Ref Range    WBC 10.3 4.4 - 11.3 x10*3/uL    nRBC 0.0 0.0 - 0.0 /100 WBCs    RBC 4.48 (L) 4.50 - 5.90 x10*6/uL    Hemoglobin 12.7 (L) 13.5 - 17.5 g/dL    Hematocrit 39.2 (L) 41.0 - 52.0 %    MCV  88 80 - 100 fL    MCH 28.3 26.0 - 34.0 pg    MCHC 32.4 32.0 - 36.0 g/dL    RDW 15.6 (H) 11.5 - 14.5 %    Platelets 215 150 - 450 x10*3/uL    Neutrophils % 92.5 40.0 - 80.0 %    Immature Granulocytes %, Automated 0.5 0.0 - 0.9 %    Lymphocytes % 5.5 13.0 - 44.0 %    Monocytes % 1.4 2.0 - 10.0 %    Eosinophils % 0.0 0.0 - 6.0 %    Basophils % 0.1 0.0 - 2.0 %    Neutrophils Absolute 9.52 (H) 1.60 - 5.50 x10*3/uL    Immature Granulocytes Absolute, Automated 0.05 0.00 - 0.50 x10*3/uL    Lymphocytes Absolute 0.57 (L) 0.80 - 3.00 x10*3/uL    Monocytes Absolute 0.14 0.05 - 0.80 x10*3/uL    Eosinophils Absolute 0.00 0.00 - 0.40 x10*3/uL    Basophils Absolute 0.01 0.00 - 0.10 x10*3/uL   Basic metabolic panel   Result Value Ref Range    Glucose 145 (H) 74 - 99 mg/dL    Sodium 137 136 - 145 mmol/L    Potassium 4.9 3.5 - 5.3 mmol/L    Chloride 102 98 - 107 mmol/L    Bicarbonate 30 21 - 32 mmol/L    Anion Gap 10 10 - 20 mmol/L    Urea Nitrogen 32 (H) 6 - 23 mg/dL    Creatinine 1.36 (H) 0.50 - 1.30 mg/dL    eGFR 51 (L) >60 mL/min/1.73m*2    Calcium 9.6 8.6 - 10.3 mg/dL   CBC and Auto Differential   Result Value Ref Range    WBC 16.4 (H) 4.4 - 11.3 x10*3/uL    nRBC 0.0 0.0 - 0.0 /100 WBCs    RBC 4.81 4.50 - 5.90 x10*6/uL    Hemoglobin 13.6 13.5 - 17.5 g/dL    Hematocrit 42.0 41.0 - 52.0 %    MCV 87 80 - 100 fL    MCH 28.3 26.0 - 34.0 pg    MCHC 32.4 32.0 - 36.0 g/dL    RDW 15.5 (H) 11.5 - 14.5 %    Platelets 242 150 - 450 x10*3/uL    Neutrophils % 90.7 40.0 - 80.0 %    Immature Granulocytes %, Automated 0.5 0.0 - 0.9 %    Lymphocytes % 5.1 13.0 - 44.0 %    Monocytes % 3.6 2.0 - 10.0 %    Eosinophils % 0.0 0.0 - 6.0 %    Basophils % 0.1 0.0 - 2.0 %    Neutrophils Absolute 14.88 (H) 1.60 - 5.50 x10*3/uL    Immature Granulocytes Absolute, Automated 0.08 0.00 - 0.50 x10*3/uL    Lymphocytes Absolute 0.84 0.80 - 3.00 x10*3/uL    Monocytes Absolute 0.59 0.05 - 0.80 x10*3/uL    Eosinophils Absolute 0.00 0.00 - 0.40 x10*3/uL    Basophils  Absolute 0.02 0.00 - 0.10 x10*3/uL   Protime-INR   Result Value Ref Range    Protime 52.1 (H) 9.8 - 12.8 seconds    INR 4.5 (H) 0.9 - 1.1   SST TOP   Result Value Ref Range    Extra Tube Hold for add-ons.       Relevant Results  Recent Results (from the past 24 hour(s))   Basic metabolic panel    Collection Time: 12/12/23  5:55 AM   Result Value Ref Range    Glucose 145 (H) 74 - 99 mg/dL    Sodium 137 136 - 145 mmol/L    Potassium 4.9 3.5 - 5.3 mmol/L    Chloride 102 98 - 107 mmol/L    Bicarbonate 30 21 - 32 mmol/L    Anion Gap 10 10 - 20 mmol/L    Urea Nitrogen 32 (H) 6 - 23 mg/dL    Creatinine 1.36 (H) 0.50 - 1.30 mg/dL    eGFR 51 (L) >60 mL/min/1.73m*2    Calcium 9.6 8.6 - 10.3 mg/dL   CBC and Auto Differential    Collection Time: 12/12/23  5:55 AM   Result Value Ref Range    WBC 16.4 (H) 4.4 - 11.3 x10*3/uL    nRBC 0.0 0.0 - 0.0 /100 WBCs    RBC 4.81 4.50 - 5.90 x10*6/uL    Hemoglobin 13.6 13.5 - 17.5 g/dL    Hematocrit 42.0 41.0 - 52.0 %    MCV 87 80 - 100 fL    MCH 28.3 26.0 - 34.0 pg    MCHC 32.4 32.0 - 36.0 g/dL    RDW 15.5 (H) 11.5 - 14.5 %    Platelets 242 150 - 450 x10*3/uL    Neutrophils % 90.7 40.0 - 80.0 %    Immature Granulocytes %, Automated 0.5 0.0 - 0.9 %    Lymphocytes % 5.1 13.0 - 44.0 %    Monocytes % 3.6 2.0 - 10.0 %    Eosinophils % 0.0 0.0 - 6.0 %    Basophils % 0.1 0.0 - 2.0 %    Neutrophils Absolute 14.88 (H) 1.60 - 5.50 x10*3/uL    Immature Granulocytes Absolute, Automated 0.08 0.00 - 0.50 x10*3/uL    Lymphocytes Absolute 0.84 0.80 - 3.00 x10*3/uL    Monocytes Absolute 0.59 0.05 - 0.80 x10*3/uL    Eosinophils Absolute 0.00 0.00 - 0.40 x10*3/uL    Basophils Absolute 0.02 0.00 - 0.10 x10*3/uL   Protime-INR    Collection Time: 12/12/23  5:55 AM   Result Value Ref Range    Protime 52.1 (H) 9.8 - 12.8 seconds    INR 4.5 (H) 0.9 - 1.1   SST TOP    Collection Time: 12/12/23  5:55 AM   Result Value Ref Range    Extra Tube Hold for add-ons.       CT chest wo IV contrast    Result Date:  "12/10/2023  STUDY: CT Chest without IV Contrast; 12/10/2023, 2:54PM. INDICATION: Follow up to abnormal chest radiograph, \"Persistent dense consolidation throughout the left chest.  Mesothelioma. COMPARISON: Chest x-ray 12/10/2023 and 2/17/2023.  CT chest 3/21/2023 and 6/14/2023 ACCESSION NUMBER(S): [id number] ORDERING CLINICIAN: ELIEL MENDIOLA TECHNIQUE:  CT of the chest was performed without contrast.  Automated mA/kV exposure control was utilized and patient examination was performed in strict accordance with principles of ALARA. FINDINGS: MEDIASTINUM: Shift of the mediastinal structures towards the left hemithorax. Cardiac size is stable.  Pacemaker leads are present.  The unenhanced thoracic aorta shows no evidence of aneurysm.  Small mediastinal lymph nodes.  Largest lymph node is a prevascular AP window lymph node on image 24 series 201 measuring 1.3 cm and unchanged. LUNGS/PLEURA: Very small left pleural effusion.  The airways are patent. The right lung is clear.  Diffuse airspace densities with air bronchograms left hemithorax. LYMPH NODES: As above. UPPER ABDOMEN: Cholelithiasis. BONES: There are no acute fractures.  No suspicious bony lesions.    There are chronic consolidative changes throughout the left upper lobe and left lower lobe.  Overall the consolidative changes have progressed slightly since CT chest 3/21/2023 and 6/14/2023.  There is chronic volume loss left hemithorax with shift of the mediastinal structures towards the left.  The patient has a history of mesothelioma.  The chronic consolidative changes throughout the left lung could be due to pneumonitis if the patient has had prior radiation therapy.  Infection/pneumonia cannot be completely excluded.  The right lung is clear. Mild mediastinal lymphadenopathy is stable. Cholelithiasis. Signed by Maicol Tinoco MD    XR chest 1 view    Result Date: 12/10/2023  STUDY: Chest Radiograph;  12/10/2023 12:18PM INDICATION: Shortness of breath. " "COMPARISON: XR Chest 02/17/2023 ACCESSION NUMBER(S): [id number] ORDERING CLINICIAN: ELIEL MENDIOLA TECHNIQUE:  Frontal chest (two images) was obtained at 12:17 hours. FINDINGS: Dense consolidation persists throughout the left chest. Right chest remains clear. Mediastinal structures are shifted to the left. Right pacemaker remains in place. There is no pneumothorax.    Persistent dense consolidation throughout the left chest. Remainder as above. Signed by Angel Rojas MD    ECG 12 lead (Clinic Performed)    Result Date: 12/6/2023  EKG performed today shows atrial paced rhythm rate of 66 bpm rotation 74 ms QT corrected 423 ms.  Rhythm strip shows the same pattern.    Radiology:  CT chest wo IV contrast    Result Date: 12/10/2023  STUDY: CT Chest without IV Contrast; 12/10/2023, 2:54PM. INDICATION: Follow up to abnormal chest radiograph, \"Persistent dense consolidation throughout the left chest.  Mesothelioma. COMPARISON: Chest x-ray 12/10/2023 and 2/17/2023.  CT chest 3/21/2023 and 6/14/2023 ACCESSION NUMBER(S): [id number] ORDERING CLINICIAN: ELIEL MENDIOLA TECHNIQUE:  CT of the chest was performed without contrast.  Automated mA/kV exposure control was utilized and patient examination was performed in strict accordance with principles of ALARA. FINDINGS: MEDIASTINUM: Shift of the mediastinal structures towards the left hemithorax. Cardiac size is stable.  Pacemaker leads are present.  The unenhanced thoracic aorta shows no evidence of aneurysm.  Small mediastinal lymph nodes.  Largest lymph node is a prevascular AP window lymph node on image 24 series 201 measuring 1.3 cm and unchanged. LUNGS/PLEURA: Very small left pleural effusion.  The airways are patent. The right lung is clear.  Diffuse airspace densities with air bronchograms left hemithorax. LYMPH NODES: As above. UPPER ABDOMEN: Cholelithiasis. BONES: There are no acute fractures.  No suspicious bony lesions.    There are chronic consolidative changes " throughout the left upper lobe and left lower lobe.  Overall the consolidative changes have progressed slightly since CT chest 3/21/2023 and 6/14/2023.  There is chronic volume loss left hemithorax with shift of the mediastinal structures towards the left.  The patient has a history of mesothelioma.  The chronic consolidative changes throughout the left lung could be due to pneumonitis if the patient has had prior radiation therapy.  Infection/pneumonia cannot be completely excluded.  The right lung is clear. Mild mediastinal lymphadenopathy is stable. Cholelithiasis. Signed by Maicol Tinoco MD    XR chest 1 view    Result Date: 12/10/2023  STUDY: Chest Radiograph;  12/10/2023 12:18PM INDICATION: Shortness of breath. COMPARISON: XR Chest 02/17/2023 ACCESSION NUMBER(S): [id number] ORDERING CLINICIAN: ELIEL MENDIOLA TECHNIQUE:  Frontal chest (two images) was obtained at 12:17 hours. FINDINGS: Dense consolidation persists throughout the left chest. Right chest remains clear. Mediastinal structures are shifted to the left. Right pacemaker remains in place. There is no pneumothorax.    Persistent dense consolidation throughout the left chest. Remainder as above. Signed by Angel Rojas MD     Current Facility-Administered Medications:     acetaminophen (Tylenol) tablet 650 mg, 650 mg, oral, q4h PRN **OR** acetaminophen (Tylenol) oral liquid 650 mg, 650 mg, nasogastric tube, q4h PRN **OR** acetaminophen (Tylenol) suppository 650 mg, 650 mg, rectal, q4h PRN, Abraham Pugh MD    acetaminophen (Tylenol) tablet 650 mg, 650 mg, oral, q4h PRN, 650 mg at 12/10/23 2247 **OR** acetaminophen (Tylenol) oral liquid 650 mg, 650 mg, oral, q4h PRN **OR** acetaminophen (Tylenol) suppository 650 mg, 650 mg, rectal, q4h PRN, Abraham Pugh MD    ascorbic acid (Vitamin C) tablet 1,000 mg, 1,000 mg, oral, Daily, Abraham Pugh MD, 1,000 mg at 12/12/23 0919    benzocaine-menthol (Cepastat Sore Throat) 15-3.6 mg lozenge 1 lozenge, 1  lozenge, Mouth/Throat, q2h PRN, Abraham Pugh MD    dextromethorphan-guaifenesin (Robitussin DM)  mg/5 mL oral liquid 5 mL, 5 mL, oral, q4h PRN, Abraham Pugh MD    ferrous sulfate (325 mg ferrous sulfate) tablet 1 tablet, 65 mg of iron, oral, BID with meals, Abraham Pugh MD, 1 tablet at 12/12/23 1710    furosemide (Lasix) tablet 20 mg, 20 mg, oral, Daily, Abraham Pugh MD, 20 mg at 12/12/23 0919    ipratropium-albuteroL (Duo-Neb) 0.5-2.5 mg/3 mL nebulizer solution 3 mL, 3 mL, nebulization, q2h PRN, Abraham Pugh MD    levoFLOXacin (Levaquin)  mg, 750 mg, intravenous, q48h, Abraham Pugh MD, Stopped at 12/10/23 2138    lubricating eye drops ophthalmic solution 1 drop, 1 drop, Both Eyes, TID PRN, Abraham Pugh MD    melatonin tablet 5 mg, 5 mg, oral, Nightly PRN, Abraham Pugh MD    methylPREDNISolone sod succinate (PF) (SOLU-Medrol) 40 mg/mL injection 40 mg, 40 mg, intravenous, q6h, Abraham Pugh MD, 40 mg at 12/12/23 1338    metoprolol tartrate (Lopressor) tablet 25 mg, 25 mg, oral, BID, Abraham Pugh MD, 25 mg at 12/12/23 0919    nitroglycerin (Nitrostat) SL tablet 0.4 mg, 0.4 mg, sublingual, q5 min PRN, Abraham Pugh MD    nystatin (Mycostatin) 100,000 unit/gram powder 1 Application, 1 Application, Topical, BID, Vonnie Fitzpatrick, APRN-CNP, 1 Application at 12/12/23 0900    ondansetron (Zofran) tablet 4 mg, 4 mg, oral, q8h PRN **OR** ondansetron (Zofran) injection 4 mg, 4 mg, intravenous, q8h PRN, Abraham Pugh MD    polyethylene glycol (Glycolax, Miralax) packet 17 g, 17 g, oral, Daily, Abraham Pugh MD, 17 g at 12/11/23 0938    potassium chloride CR (Klor-Con) ER tablet 10 mEq, 10 mEq, oral, Daily, Abraham Pugh MD    rosuvastatin (Crestor) tablet 10 mg, 10 mg, oral, Daily, Abraham Pugh MD, 10 mg at 12/12/23 0919    traZODone (Desyrel) tablet 100 mg, 100 mg, oral, Nightly, Abraham Pugh MD, 100 mg at 12/11/23 2209   Principal Problem:     COVID      Assessment/Plan      Acute hypoxic respiratory failure continue oxygen keeping saturation more than 90% patient on 3 L oxygen nasal cannula.  Patient O2 saturation on room air was 84% on arrival.  And at home as well.  History of mesothelioma s/p left VATS and pleurodesis and s/p radiation therapy for mesothelioma.  Mesothelioma getting worse for many months.  Patient and family aware and discussed with them.  Consider palliative care.  Case discussed with patient and wife.  And ER physician.  Hospitalist updated about this mesothelioma diagnosis.  Consider changing the CODE STATUS to DNR.  Left-sided consolidation and volume loss is secondary to mesothelioma, atelectasis, infiltrate cannot be ruled out agree with antibiotic.  Covid-19 infection.  COPD exacerbation in a former smoker.  Continue bronchodilator as ordered.  Sleep apnea patient on BiPAP at home.  Morbid obesity patient advised reduce weight by diet exercise.  Hypertension.  Dyslipidemia.  Coronary disease PTCA in the remote past.  History of pulmonary embolism and DVT in the past.  Paroxysmal atrial fibrillation history.  Sick sinus syndrome and pacemaker placement.  Chronic kidney disease.  BPH.  EtOH counseling done.  DVT prophylaxis.  PT OT.  P.o. diet.  Palliative care consulted.  Patient clinically improving.  Palliative care patient wants for home.  Wife is sick at home.  Luis Villasenor MD

## 2023-12-13 NOTE — PROGRESS NOTES
Physical Therapy    Physical Therapy Evaluation    Patient Name: Vinicius Arriola  MRN: [id number]  Today's Date: 12/13/2023   Time Calculation  Start Time: 1237  Stop Time: 1252  Time Calculation (min): 15 min    Assessment/Plan   PT Assessment  PT Assessment Results: Decreased strength, Decreased endurance, Impaired balance, Decreased mobility, Obesity  Rehab Prognosis: Good  Evaluation/Treatment Tolerance: Patient limited by fatigue  Medical Staff Made Aware: Yes  Strengths: Support of Caregivers, Living arrangement secure, Housing layout  Barriers to Participation: Comorbidities  End of Session Communication: Bedside nurse  End of Session Patient Position: Bed, 3 rail up, Alarm on (all needs within reach)  IP OR SWING BED PT PLAN  Inpatient or Swing Bed: Inpatient  PT Plan  Treatment/Interventions: Bed mobility, Transfer training, Gait training, Strengthening, Endurance training, Balance training  PT Plan: Skilled PT  PT Frequency: 3 times per week  PT Discharge Recommendations: Moderate intensity level of continued care  PT Recommended Transfer Status: Assist x1, Assistive device  Physical Therapy eval completed per MD requisition. P.T. recommendations as outlined above. Recommend D/C from acute care when medically appropriate as deemed by medical staff.    Subjective     General Visit Information:  General  Reason for Referral: impiared mobility  Referred By: Dr. Manzo (PT/OT 12/12)  Past Medical History Relevant to Rehab: includeS: CPAP, JOVANNI, HTN, HLD, DVT, CAD, OA, A-fib, PPM, PE, polycythemia vera, mesothelioma, obesity SSS, BPH  Family/Caregiver Present: Yes  Caregiver Feedback: Spouse supportive  Prior to Session Communication: Bedside nurse  Patient Position Received: Bed, 3 rail up, Alarm on  Preferred Learning Style: auditory, verbal  General Comment: Pt. is an 89yo who [place] [facility] [place] with increased SOB x 2 days with cough (+) emesis. O2 on RA in 802. Pt. tested (+) for Covid.    CXR  (12/10/2023 (+) increased L consilodation    Chest CT (12/10) JESSIE and LLL consolidation   INR (12/13) 3.9 trending down    Dx: Covid, elevated INR, acute resp failure wtih hypoxia    Home Living:  Home Living  Home Living Comments: Pt. lives with wife in a 1 story house with 4 ELIZABETH with HR.  Bed/bath on 1st floor with walk in shower with seat and safety bar.    Prior Level of Function:  Prior Function Per Pt/Caregiver Report  Prior Function Comments: Pt. amb with FWW inside anfd RW outside. Pt. was I with ADL. Wife managed IADL. Wife reported 1 fall in last 6 months. Wife does the driving.    Precautions:  Precautions  Medical Precautions:  (Activity orders: No restrictions)  Precautions Comment: PEr EMR: High fall risk; (+) Covid droplet+ precautions    Vital Signs:  Vital Signs  Heart Rate: 91  SpO2: 96 % (3L)  Objective     Pain:  Pain Assessment  Pain Assessment: 0-10  Pain Score: 0 - No pain    Cognition:  Cognition  Orientation Level: Disoriented to time    General Assessments:  General Observation  General Observation: 3L O2, tele   Activity Tolerance  Endurance: Decreased tolerance for upright activites                 Dynamic Sitting Balance  Dynamic Sitting-Comments: Good static and Good- dynmaic sitting balance  Dynamic Standing Balance  Dynamic Standing-Comments: Fair+ static and fair dynmaic standing balance    Functional Assessments:     Bed Mobility  Bed Mobility: Yes  Bed Mobility 1  Bed Mobility 1: Supine to sitting  Level of Assistance 1: Maximum assistance  Bed Mobility Comments 1: A to maneuver LES over EOB and to elevate trunk from bed  Bed Mobility 2  Bed Mobility  2: Sitting to supine  Level of Assistance 2: Maximum assistance  Bed Mobility Comments 2: A to lift LEs onto bed and to control descent/placement of trunk  Transfers  Transfer: Yes  Transfer 1  Technique 1: Sit to stand  Transfer Device 1:  (FWW)  Transfer Level of Assistance 1: Moderate assistance  Trials/Comments 1: A for lifting,  transferring weight over feet, steadying  Transfers 2  Technique 2: Stand to sit  Transfer Device 2:  (FWW)  Transfer Level of Assistance 2: Moderate assistance  Trials/Comments 2: A to control descent, VC's for hand placement  Ambulation/Gait Training  Ambulation/Gait Training Performed: Yes  Ambulation/Gait Training 1  Surface 1: Level tile  Device 1: Rolling walker  Assistance 1: Moderate assistance  Quality of Gait 1: Wide base of support (slow, step-to gait, discontinuous steps)  Comments/Distance (ft) 1: 8' sidestepping. A for balacne, safety, weight shift. VC's for walker placement  Stairs  Stairs: No       Extremity/Trunk Assessments:        RLE   RLE :  (AROM WFL, strength 4-/5)  LLE   LLE :  (AROM WFL, strength 4-/5)    Outcome Measures:  [facility] Basic Mobility  Turning from your back to your side while in a flat bed without using bedrails: A lot  Moving from lying on your back to sitting on the side of a flat bed without using bedrails: A lot  Moving to and from bed to chair (including a wheelchair): A lot  Standing up from a chair using your arms (e.g. wheelchair or bedside chair): A lot  To walk in hospital room: A lot  Climbing 3-5 steps with railing: Total  Basic Mobility - Total Score: 11                            Goals:  Encounter Problems       Encounter Problems (Active)       PT Problem       Pt. will transfer supine/sit with MOD A X 1 (Progressing)       Start:  12/13/23    Expected End:  12/27/23            Pt. will transfer sit/stand with FWW with MIN A x 1 (Progressing)       Start:  12/13/23    Expected End:  12/27/23            Pt. will complete stand pivot transfers with FWW with MIN A X 1 (Progressing)       Start:  12/13/23    Expected End:  12/27/23            Pt.will ambulate 40' with FWW with MIN A x 1 (Progressing)       Start:  12/13/23    Expected End:  12/27/23            Pt. will complete 2 x 15 B LE AROM exercises (Not Progressing)       Start:  12/13/23    Expected End:   12/27/23               Pain - Adult            Education Documentation  Mobility Training, taught by Tone Oglesby, PT at 12/13/2023  2:52 PM.  Learner: Significant Other, Patient  Readiness: Acceptance  Method: Explanation  Response: Verbalizes Understanding, Needs Reinforcement  Comment: Role of PT. transfers, amb, safety, PT POC

## 2023-12-13 NOTE — CONSULTS
Consults    Reason For Consult  Goals of care, introduction of palliative care services.     History Of Present Illness  Vinicius Arriola is a 88 y.o. male with past medical history of mesothelioma who presented from home with complaints of worsening SOB for several days.  Wife triale CPAP which did help.  Found to be covid positive.  He is clinically improving though given worsening mesothelioma and no options for treatment palliative care was recommended by primary to introduce services.      ROS: +SOB which is improving, +decreased appetite with weight loss,     Personal/Social History   He reports that he quit smoking about 56 years ago. His smoking use included cigarettes. He started smoking about 66 years ago. He smoked an average of 1 pack per day. He has never used smokeless tobacco. He reports current alcohol use of about 4.0 standard drinks of alcohol per week. He reports that he does not use drugs.    Past Medical History  He has a past medical history of Acute embolism and thrombosis of unspecified deep veins of unspecified lower extremity (CMS/HCC), Dental disease, Encounter for preprocedural cardiovascular examination (11/02/2021), Obesity, unspecified (07/15/2022), Personal history of diseases of the blood and blood-forming organs and certain disorders involving the immune mechanism, Personal history of other diseases of male genital organs, Personal history of other diseases of the circulatory system, Personal history of other diseases of the circulatory system, Personal history of other diseases of the circulatory system (10/21/2021), Personal history of other diseases of the circulatory system, Personal history of other diseases of the nervous system and sense organs, Personal history of other malignant neoplasm of skin, Personal history of other specified conditions, and Personal history of other venous thrombosis and embolism.    Surgical History  He has a past surgical history that includes Other  "surgical history (08/20/2019); Other surgical history (08/20/2019); Other surgical history (08/20/2019); Other surgical history (06/10/2022); Other surgical history (11/02/2021); Other surgical history (01/02/2020); Other surgical history (01/02/2020); Other surgical history (05/13/2022); Other surgical history (05/26/2022); Other surgical history (10/21/2021); Other surgical history (11/02/2021); Other surgical history (11/02/2021); Other surgical history (11/02/2021); Other surgical history (11/02/2021); and Other surgical history (11/04/2021).     Family History  Family History   Problem Relation Name Age of Onset    Accidental death Mother      Coronary artery disease Mother      Other (Cardiac disorder) Father      Dementia Father      Cancer Father      Colon cancer Daughter       Allergies  Benadryl allergy decongestant, Diphenhydramine, and Diphenhydramine hcl       Physical Exam  GEN: obese, no apparent distress noted  HEENT: MMM, PERRL  PULM: even and regular, on nasal cannula oxygen  ABD: obese, soft, non tender  MSK: moves all ext  NEURO: grossly intact  EXT: no edema  PSYCH: appropriate to situation    Last Recorded Vitals  Blood pressure 138/77, pulse 85, temperature 36.7 °C (98.1 °F), resp. rate 22, height 1.727 m (5' 8\"), weight 115 kg (254 lb 3.1 oz), SpO2 92 %.    Relevant Results  No current facility-administered medications on file prior to encounter.     Current Outpatient Medications on File Prior to Encounter   Medication Sig Dispense Refill    ascorbic acid (Vitamin C) 1,000 mg tablet Take 1 tablet (1,000 mg) by mouth once daily.      carboxymethylcellulose (Refresh Celluvisc) 1 % ophthalmic solution dropperette Administer 1 drop into affected eye(s) once daily in the morning.      ergocalciferol (Vitamin D-2) 1.25 MG (96698 UT) capsule Take 1 capsule (1,250 mcg) by mouth 1 (one) time per week.      ferrous sulfate 325 (65 Fe) MG EC tablet Take 65 mg by mouth 2 times a day with meals. Do not " crush, chew, or split.      furosemide (Lasix) 20 mg tablet Take 1 tablet (20 mg) by mouth once daily.      melatonin 5 mg tablet Take 1 tablet (5 mg) by mouth as needed at bedtime.      metoprolol tartrate (Lopressor) 25 mg tablet Take 1 tablet (25 mg) by mouth 2 times a day.      nitroglycerin (Nitrostat) 0.4 mg SL tablet Place under the tongue every 5 minutes if needed for chest pain (up to 3 doses).      omega-3 fatty acids-fish oil 360-1,200 mg capsule Take 1 capsule (1,200 mg) by mouth twice a day.      potassium chloride CR 20 mEq ER tablet Take 0.5 tablets (10 mEq) by mouth once daily.      rosuvastatin (Crestor) 10 mg tablet Take 1 tablet (10 mg) by mouth once daily.      traZODone (Desyrel) 50 mg tablet Take 2 tablets (100 mg) by mouth once daily at bedtime.      warfarin (Coumadin) 1 mg tablet Take 1-2 tablets (1-2 mg) by mouth. Take 1-2 tablets daily or as directed per [facility]  Take as directed per After Visit Summary.      warfarin (Coumadin) 4 mg tablet TAKE AS DIRECTED Per [facility] Heart Protime Department      ZINC ORAL Take 1 tablet by mouth once daily.        Results for orders placed or performed during the hospital encounter of 12/10/23 (from the past 24 hour(s))   SST TOP   Result Value Ref Range    Extra Tube Hold for add-ons.    Basic metabolic panel   Result Value Ref Range    Glucose 153 (H) 74 - 99 mg/dL    Sodium 135 (L) 136 - 145 mmol/L    Potassium 4.4 3.5 - 5.3 mmol/L    Chloride 102 98 - 107 mmol/L    Bicarbonate 28 21 - 32 mmol/L    Anion Gap 9 (L) 10 - 20 mmol/L    Urea Nitrogen 33 (H) 6 - 23 mg/dL    Creatinine 1.28 0.50 - 1.30 mg/dL    eGFR 55 (L) >60 mL/min/1.73m*2    Calcium 9.0 8.6 - 10.3 mg/dL   CBC and Auto Differential   Result Value Ref Range    WBC 14.1 (H) 4.4 - 11.3 x10*3/uL    nRBC 0.0 0.0 - 0.0 /100 WBCs    RBC 4.62 4.50 - 5.90 x10*6/uL    Hemoglobin 13.1 (L) 13.5 - 17.5 g/dL    Hematocrit 40.1 (L) 41.0 - 52.0 %    MCV 87 80 - 100 fL    MCH 28.4 26.0 - 34.0  pg    MCHC 32.7 32.0 - 36.0 g/dL    RDW 15.6 (H) 11.5 - 14.5 %    Platelets 224 150 - 450 x10*3/uL    Neutrophils % 91.8 40.0 - 80.0 %    Immature Granulocytes %, Automated 1.0 (H) 0.0 - 0.9 %    Lymphocytes % 4.0 13.0 - 44.0 %    Monocytes % 3.1 2.0 - 10.0 %    Eosinophils % 0.0 0.0 - 6.0 %    Basophils % 0.1 0.0 - 2.0 %    Neutrophils Absolute 12.92 (H) 1.60 - 5.50 x10*3/uL    Immature Granulocytes Absolute, Automated 0.14 0.00 - 0.50 x10*3/uL    Lymphocytes Absolute 0.56 (L) 0.80 - 3.00 x10*3/uL    Monocytes Absolute 0.44 0.05 - 0.80 x10*3/uL    Eosinophils Absolute 0.00 0.00 - 0.40 x10*3/uL    Basophils Absolute 0.02 0.00 - 0.10 x10*3/uL   Protime-INR   Result Value Ref Range    Protime 44.4 (H) 9.8 - 12.8 seconds    INR 3.9 (H) 0.9 - 1.1       Assessment/Plan   Goals of care: met with patient and spouse.  She has been researching palliative care on the internet and agrees with philosophy of care.  Discussed code status, pt agreeable to DNR+DNI.  Wife appeared to have trouble understanding DNR options which I reminded her of several times.    -code status updated to DNR+DNI    Palliative care was introduced as a service for patients with serious illness to help with symptoms, assist with goals of care conversations, navigate complex decision making, improve quality of life for patients, and provide support both patients and families. Patient's current clinical condition, including diagnosis, prognosis, and management plan, and goals of care were discussed.   -SW consulted for palliative referral    I spent 35 minutes in the professional and overall care of this patient.      Hossein Holder, APRN-CNP

## 2023-12-14 LAB
ANION GAP SERPL CALC-SCNC: 11 MMOL/L (ref 10–20)
BUN SERPL-MCNC: 36 MG/DL (ref 6–23)
CALCIUM SERPL-MCNC: 8.6 MG/DL (ref 8.6–10.3)
CHLORIDE SERPL-SCNC: 103 MMOL/L (ref 98–107)
CO2 SERPL-SCNC: 26 MMOL/L (ref 21–32)
CREAT SERPL-MCNC: 1.25 MG/DL (ref 0.5–1.3)
ERYTHROCYTE [DISTWIDTH] IN BLOOD BY AUTOMATED COUNT: 15.3 % (ref 11.5–14.5)
GFR SERPL CREATININE-BSD FRML MDRD: 56 ML/MIN/1.73M*2
GLUCOSE SERPL-MCNC: 153 MG/DL (ref 74–99)
HCT VFR BLD AUTO: 41.2 % (ref 41–52)
HGB BLD-MCNC: 13.6 G/DL (ref 13.5–17.5)
HOLD SPECIMEN: NORMAL
INR PPP: 3.3 (ref 0.9–1.1)
MCH RBC QN AUTO: 28.5 PG (ref 26–34)
MCHC RBC AUTO-ENTMCNC: 33 G/DL (ref 32–36)
MCV RBC AUTO: 86 FL (ref 80–100)
NRBC BLD-RTO: 0 /100 WBCS (ref 0–0)
PLATELET # BLD AUTO: 219 X10*3/UL (ref 150–450)
POTASSIUM SERPL-SCNC: 4.4 MMOL/L (ref 3.5–5.3)
PROTHROMBIN TIME: 37.6 SECONDS (ref 9.8–12.8)
RBC # BLD AUTO: 4.77 X10*6/UL (ref 4.5–5.9)
SODIUM SERPL-SCNC: 136 MMOL/L (ref 136–145)
WBC # BLD AUTO: 11.9 X10*3/UL (ref 4.4–11.3)

## 2023-12-14 PROCEDURE — 85610 PROTHROMBIN TIME: CPT | Performed by: INTERNAL MEDICINE

## 2023-12-14 PROCEDURE — 85027 COMPLETE CBC AUTOMATED: CPT | Performed by: INTERNAL MEDICINE

## 2023-12-14 PROCEDURE — 1100000001 HC PRIVATE ROOM DAILY

## 2023-12-14 PROCEDURE — 2500000004 HC RX 250 GENERAL PHARMACY W/ HCPCS (ALT 636 FOR OP/ED): Performed by: STUDENT IN AN ORGANIZED HEALTH CARE EDUCATION/TRAINING PROGRAM

## 2023-12-14 PROCEDURE — 97535 SELF CARE MNGMENT TRAINING: CPT | Mod: GO,CO

## 2023-12-14 PROCEDURE — 36415 COLL VENOUS BLD VENIPUNCTURE: CPT | Performed by: INTERNAL MEDICINE

## 2023-12-14 PROCEDURE — 80048 BASIC METABOLIC PNL TOTAL CA: CPT | Performed by: INTERNAL MEDICINE

## 2023-12-14 PROCEDURE — 97116 GAIT TRAINING THERAPY: CPT | Mod: GP | Performed by: PHYSICAL THERAPIST

## 2023-12-14 PROCEDURE — 99239 HOSP IP/OBS DSCHRG MGMT >30: CPT | Performed by: INTERNAL MEDICINE

## 2023-12-14 PROCEDURE — 2500000001 HC RX 250 WO HCPCS SELF ADMINISTERED DRUGS (ALT 637 FOR MEDICARE OP): Performed by: STUDENT IN AN ORGANIZED HEALTH CARE EDUCATION/TRAINING PROGRAM

## 2023-12-14 PROCEDURE — 97110 THERAPEUTIC EXERCISES: CPT | Mod: GP | Performed by: PHYSICAL THERAPIST

## 2023-12-14 RX ORDER — PREDNISONE 20 MG/1
40 TABLET ORAL DAILY
Status: DISCONTINUED | OUTPATIENT
Start: 2023-12-15 | End: 2023-12-15 | Stop reason: HOSPADM

## 2023-12-14 RX ORDER — PREDNISONE 20 MG/1
40 TABLET ORAL DAILY
Qty: 10 TABLET | Refills: 0 | Status: SHIPPED | OUTPATIENT
Start: 2023-12-15 | End: 2023-12-20

## 2023-12-14 RX ADMIN — NYSTATIN 1 APPLICATION: 100000 POWDER TOPICAL at 20:08

## 2023-12-14 RX ADMIN — POLYETHYLENE GLYCOL 3350 17 G: 17 POWDER, FOR SOLUTION ORAL at 08:47

## 2023-12-14 RX ADMIN — FUROSEMIDE 20 MG: 40 TABLET ORAL at 09:00

## 2023-12-14 RX ADMIN — FERROUS SULFATE TAB 325 MG (65 MG ELEMENTAL FE) 1 TABLET: 325 (65 FE) TAB at 08:46

## 2023-12-14 RX ADMIN — ROSUVASTATIN CALCIUM 10 MG: 10 TABLET, FILM COATED ORAL at 08:46

## 2023-12-14 RX ADMIN — POTASSIUM CHLORIDE 10 MEQ: 750 TABLET, EXTENDED RELEASE ORAL at 08:46

## 2023-12-14 RX ADMIN — TRAZODONE HYDROCHLORIDE 100 MG: 50 TABLET ORAL at 20:07

## 2023-12-14 RX ADMIN — NYSTATIN 1 APPLICATION: 100000 POWDER TOPICAL at 09:00

## 2023-12-14 RX ADMIN — FERROUS SULFATE TAB 325 MG (65 MG ELEMENTAL FE) 1 TABLET: 325 (65 FE) TAB at 18:05

## 2023-12-14 RX ADMIN — METHYLPREDNISOLONE SODIUM SUCCINATE 40 MG: 40 INJECTION, POWDER, FOR SOLUTION INTRAMUSCULAR; INTRAVENOUS at 03:51

## 2023-12-14 RX ADMIN — Medication 1000 MG: at 08:46

## 2023-12-14 RX ADMIN — METOPROLOL TARTRATE 25 MG: 25 TABLET, FILM COATED ORAL at 08:46

## 2023-12-14 RX ADMIN — METOPROLOL TARTRATE 25 MG: 25 TABLET, FILM COATED ORAL at 20:08

## 2023-12-14 RX ADMIN — METHYLPREDNISOLONE SODIUM SUCCINATE 40 MG: 40 INJECTION, POWDER, FOR SOLUTION INTRAMUSCULAR; INTRAVENOUS at 10:19

## 2023-12-14 ASSESSMENT — COGNITIVE AND FUNCTIONAL STATUS - GENERAL
DRESSING REGULAR UPPER BODY CLOTHING: A LITTLE
MOVING TO AND FROM BED TO CHAIR: A LITTLE
STANDING UP FROM CHAIR USING ARMS: A LITTLE
DAILY ACTIVITIY SCORE: 16
WALKING IN HOSPITAL ROOM: A LITTLE
MOVING FROM LYING ON BACK TO SITTING ON SIDE OF FLAT BED WITH BEDRAILS: A LITTLE
HELP NEEDED FOR BATHING: A LITTLE
MOVING TO AND FROM BED TO CHAIR: A LITTLE
DRESSING REGULAR UPPER BODY CLOTHING: A LITTLE
DRESSING REGULAR LOWER BODY CLOTHING: A LOT
CLIMB 3 TO 5 STEPS WITH RAILING: TOTAL
TURNING FROM BACK TO SIDE WHILE IN FLAT BAD: A LITTLE
STANDING UP FROM CHAIR USING ARMS: A LITTLE
TOILETING: A LOT
TOILETING: A LITTLE
MOBILITY SCORE: 18
HELP NEEDED FOR BATHING: A LOT
PERSONAL GROOMING: A LITTLE
DRESSING REGULAR LOWER BODY CLOTHING: A LITTLE
CLIMB 3 TO 5 STEPS WITH RAILING: A LOT
WALKING IN HOSPITAL ROOM: A LITTLE
MOBILITY SCORE: 16
DAILY ACTIVITIY SCORE: 20
TURNING FROM BACK TO SIDE WHILE IN FLAT BAD: A LITTLE

## 2023-12-14 ASSESSMENT — PAIN - FUNCTIONAL ASSESSMENT
PAIN_FUNCTIONAL_ASSESSMENT: 0-10
PAIN_FUNCTIONAL_ASSESSMENT: 0-10

## 2023-12-14 ASSESSMENT — PAIN SCALES - GENERAL
PAINLEVEL_OUTOF10: 0 - NO PAIN

## 2023-12-14 ASSESSMENT — ACTIVITIES OF DAILY LIVING (ADL): HOME_MANAGEMENT_TIME_ENTRY: 10

## 2023-12-14 NOTE — CONSULTS
Social Work Note    SW received consult for palliative care at discharge. Pt is discharging to [facility] from hospital. SW discussed referral process with pt spouse and explained plan for referral to be placed to an agency of their choice while at [facility] and prior to pt discharging home. Spouse uncertain of what palliative care is, SW explained palliative care program and philosophy of care. Spouse in agreement with plan for palliative care at home. SW added need for consult to pall care prior to pt discharging home to [facility].

## 2023-12-14 NOTE — DISCHARGE SUMMARY
Discharge Diagnosis  COVID    Issues Requiring Follow-Up  PCP follow up in 2 weeks.     Discharge Meds     Your medication list        START taking these medications        Instructions Last Dose Given Next Dose Due   predniSONE 20 mg tablet  Commonly known as: Deltasone  Start taking on: December 15, 2023      Take 2 tablets (40 mg) by mouth once daily for 5 doses. Do not start before December 15, 2023.              CONTINUE taking these medications        Instructions Last Dose Given Next Dose Due   ascorbic acid 1,000 mg tablet  Commonly known as: Vitamin C           carboxymethylcellulose 1 % ophthalmic solution dropperette  Commonly known as: Refresh Celluvisc           ergocalciferol 1.25 MG (35870 UT) capsule  Commonly known as: Vitamin D-2           ferrous sulfate 325 (65 Fe) MG EC tablet           furosemide 20 mg tablet  Commonly known as: Lasix           melatonin 5 mg tablet           metoprolol tartrate 25 mg tablet  Commonly known as: Lopressor           nitroglycerin 0.4 mg SL tablet  Commonly known as: Nitrostat           omega-3 fatty acids-fish oil 360-1,200 mg capsule           potassium chloride CR 20 mEq ER tablet  Commonly known as: Klor-Con M20           rosuvastatin 10 mg tablet  Commonly known as: Crestor           traZODone 50 mg tablet  Commonly known as: Desyrel           warfarin 4 mg tablet  Commonly known as: Coumadin      Take as directed. If you are unsure how to take this medication, talk to your nurse or doctor.       warfarin 1 mg tablet  Commonly known as: Coumadin      Take as directed. If you are unsure how to take this medication, talk to your nurse or doctor.       ZINC ORAL                     Where to Get Your Medications        These medications were sent to [facility]/pharmacy #[id number] [address] AT [place] [id number]      Phone: [phone]   predniSONE 20 mg tablet         Test Results Pending At Discharge  Pending  Labs       No current pending labs.            Hospital Course     88-year-old male with past medical history of mesothelioma, sleep apnea, obesity, CKD admitted with acute hypoxemic respiratory failure most likely secondary to progression of mesothelioma, possible pneumonia, elevated creatinine, hyponatremia with positive COVID     His acute hypoxemic respiratory failure and chest imaging changes are not related to COVID  His mesothelioma has been progressively getting worse  There is a possibility of superimposed pneumonia  Started on Levaquin, Pro-Kevin was 0.03 can possibly discontinue Levaquin  Continue DuoNebs, CPAP at night  Switch to oral steroids prednisone 40 mg daily x 5 days  Will order home O2 eval  Palliative care has been consulted, did have a detailed goals of care discussion with the patient and wife and they seem to understand  Pulmonology on board  Creatinine has remained relatively stable, continue to monitor  Continue statin, trazodone, metoprolol, iron supplementation  Seems like was started on p.o. Lasix by pulmonology    Patient is feeling much better today.  He was evaluated by PT/OT and recommended rehab on discharge.  He has been accepted to rehab and he will be discharged today in stable condition.  Advised him to follow-up with his PCP as outpatient in 1 to 2 weeks.      Discharge time spent more than 30 minutes.      Pertinent Physical Exam At Time of Discharge  Physical Exam   constitutional: No acute distress, awake, alert  Head/Neck: Neck supple  Respiratory/Thorax: Lungs are Clear to auscultation  Cardiovascular: Regular, rate and rhythm,  2+ equal pulses of the extremities, normal S 1and S 2  Gastrointestinal: Nondistended, soft, non-tender, no rebound tenderness or guarding  Extremities: No edema. No calf tenderness.  Neurological: Awake and alert. No focal neurological deficits  Psychological: Appropriate mood and behavior    Outpatient Follow-Up  Future Appointments   Date Time  Provider Department [place]   2/16/2024  1:00 PM Tina Henderson MD [id number] [place]   6/6/2024  1:30 PM Genesis Pacheco APRN-CNP [id number] [place]         Ruiz Manzo MD

## 2023-12-14 NOTE — DISCHARGE INSTRUCTIONS
Consult Palliative Care agency of patient choice prior to discharge home secondary to Metastatic mesothelioma, now with worsening shortness of breath and acute hypoxemia not on treatment.

## 2023-12-14 NOTE — PROGRESS NOTES
Spoke to patient's wife, Kirsten, she would prefer [facility], patient had been there in the past. Will send referral if accepted.

## 2023-12-14 NOTE — PROGRESS NOTES
Physical Therapy    Physical Therapy Treatment    Patient Name: Vinicius Arriola  MRN: [id number]  Today's Date: 12/14/2023  Time Calculation  Start Time: 1246  Stop Time: 1310  Time Calculation (min): 24 min       Assessment/Plan   PT Assessment  PT Assessment Results: Decreased strength, Decreased endurance, Impaired balance, Decreased mobility, Obesity  Rehab Prognosis: Good  Evaluation/Treatment Tolerance: Patient tolerated treatment well  Medical Staff Made Aware: Yes  Strengths: Ability to acquire knowledge, Access to adaptive/assistive products, Attitude of self, Capable of completing ADLs semi/independent, Coping skills, Housing layout, Living arrangement secure, Support of Caregivers  Barriers to Participation: Comorbidities  End of Session Communication: Bedside nurse  End of Session Patient Position: Up in chair, Alarm on  PT Plan  Inpatient/Swing Bed or Outpatient: Inpatient  PT Plan  Treatment/Interventions: Bed mobility, Transfer training, Gait training, Balance training, Strengthening, Endurance training  PT Plan: Skilled PT  PT Frequency: 3 times per week  PT Discharge Recommendations: Moderate intensity level of continued care  PT Recommended Transfer Status: Assist x1, Assistive device        General Visit Information:   PT  Visit  PT Received On: 12/14/23  Response to Previous Treatment: Patient with no complaints from previous session.  General  Reason for Referral: impiared mobility  Referred By: Dr. Manzo (PT/OT 12/12)  Past Medical History Relevant to Rehab: includes: CPAP, JOVANNI, HTN, HLD, DVT, CAD, OA, A-fib, PPM, PE, polycythemia vera, mesothelioma, obesity SSS, BPH  Family/Caregiver Present: Yes  Caregiver Feedback: Spouse supportive  Co-Treatment: PT  Co-Treatment Reason: Pt. safety  Prior to Session Communication: Bedside nurse  Patient Position Received: Bed, 2 rail up, Alarm on  Preferred Learning Style: auditory, verbal  General Comment: Pt. agreeable to therapy. Pt. states he will be  going to a SNF  Subjective     Precautions:  Precautions  Medical Precautions: Infection precautions ((+) Covid)  Precautions Comment: PEr EMR: High fall risk; (+) Covid droplet+ precautions    Vital Signs:  Vital Signs  Heart Rate:  (Pre amb 61; post amb 103)  SpO2: 94 % (On RA)  Objective     Pain:  Pain Assessment  Pain Assessment: 0-10  Pain Score: 0 - No pain    Cognition:  Cognition  Orientation Level: Oriented X4           Activity Tolerance:  Activity Tolerance  Endurance: Tolerates 10 - 20 min exercise with multiple rests    Treatments:  Therapeutic Exercise  Therapeutic Exercise Performed: Yes  Therapeutic Exercise Activity 1: Seated LE ther ex 2 x 15: MArchingf, LAQ, AP        Bed Mobility  Bed Mobility: Yes  Bed Mobility 1  Bed Mobility 1: Supine to sitting  Level of Assistance 1: Minimum assistance  Bed Mobility Comments 1: A to elevate trunk from bed  Bed Mobility 2  Bed Mobility  2: Sitting to supine  Level of Assistance 2: Maximum assistance  Bed Mobility Comments 2: A to lift LEs onto bed and to control descent/placement of trunk  Ambulation/Gait Training  Ambulation/Gait Training Performed: Yes  Ambulation/Gait Training 1  Surface 1: Level tile  Device 1: Rolling walker  Assistance 1: Minimum assistance  Quality of Gait 1: Wide base of support  Comments/Distance (ft) 1: 10', 25'; A for balacne, safety, weight shift. VC's for walker placement  Transfers  Transfer: Yes  Transfer 1  Technique 1: Sit to stand  Transfer Device 1:  (FWW)  Transfer Level of Assistance 1: Minimum assistance  Trials/Comments 1: A for lifting and steadying  Transfers 2  Technique 2: Stand to sit  Transfer Device 2:  (FWW)  Transfer Level of Assistance 2: Contact guard  Trials/Comments 2: A to control descent  Stairs  Stairs: No       Outcome Measures:  [facility] Basic Mobility  Turning from your back to your side while in a flat bed without using bedrails: A little  Moving from lying on your back to sitting on the side of a flat  bed without using bedrails: A little  Moving to and from bed to chair (including a wheelchair): A little  Standing up from a chair using your arms (e.g. wheelchair or bedside chair): A little  To walk in hospital room: A little  Climbing 3-5 steps with railing: Total  Basic Mobility - Total Score: 16  Education Documentation  Mobility Training, taught by Tone Oglesby, PT at 12/14/2023  3:14 PM.  Learner: Significant Other, Patient  Readiness: Acceptance  Method: Explanation  Response: Verbalizes Understanding  Comment: Role of PT. transfers, amb, safety, PT POC      Encounter Problems       Encounter Problems (Active)       PT Problem       Pt. will transfer supine/sit with MOD A X 1 (Progressing)       Start:  12/13/23    Expected End:  12/27/23            Pt. will transfer sit/stand with FWW with MIN A x 1 (Progressing)       Start:  12/13/23    Expected End:  12/27/23            Pt. will complete stand pivot transfers with FWW with MIN A X 1 (Progressing)       Start:  12/13/23    Expected End:  12/27/23            Pt.will ambulate 40' with FWW with MIN A x 1 (Progressing)       Start:  12/13/23    Expected End:  12/27/23            Pt. will complete 2 x 15 B LE AROM exercises (Progressing)       Start:  12/13/23    Expected End:  12/27/23               Pain - Adult

## 2023-12-14 NOTE — PROGRESS NOTES
Occupational Therapy    OT Treatment    Patient Name: Vinicius Arriola  MRN: [id number]  Today's Date: 12/14/2023  Time Calculation  Start Time: 1245  Stop Time: 1255  Time Calculation (min): 10 min         Assessment:  End of Session Communication: Bedside nurse  End of Session Patient Position: Up in chair, Alarm on         Subjective   Previous Visit Info:  OT Last Visit  OT Received On: 12/14/23  General:  General  Prior to Session Communication: Bedside nurse  Patient Position Received: Bed, 2 rail up, Alarm on  General Comment: Covid +, fall safety precautions  Precautions:     Vital Signs:     Pain:  Pain Assessment  Pain Score: 0 - No pain    Objective    Cognition:     Coordination:     Activities of Daily Living: LE Dressing  LE Dressing:  (pt seated in recliner engaging in LB dressing tasks, pt required min A for threading shorts over BLE. pt standing with FWW performing clothing mgmt over B hips.)  Functional Standing Tolerance:  Functional Standing Tolerance Comments: pt demos F - supported standing balance during LB dressing tasks  Bed Mobility/Transfers: Transfers  Transfer:  (pt performing various functional transfers with use of FWW and required min A for maintaining upright balance. pt educated on safe hand placement when performing sit  < > stand transfers)      Outcome Measures:[facility] Daily Activity  Putting on and taking off regular lower body clothing: A lot  Bathing (including washing, rinsing, drying): A lot  Putting on and taking off regular upper body clothing: A little  Toileting, which includes using toilet, bedpan or urinal: A lot  Taking care of personal grooming such as brushing teeth: A little  Eating Meals: None  Daily Activity - Total Score: 16        Education Documentation  ADL Training, taught by BETH Cheema at 12/14/2023  2:07 PM.  Learner: Patient  Readiness: Acceptance  Method: Explanation  Response: Verbalizes Understanding  Comment: pt educated on OT POC and EC techs  such as use of LH AE.    Education Comments  No comments found.        OP EDUCATION:       Goals:  Encounter Problems       Encounter Problems (Active)       OT Goals       Min assist bed mobility. (Progressing)       Start:  12/13/23    Expected End:  12/27/23            CGA sit/stand, bed/chair/commode with FWW.  (Progressing)       Start:  12/13/23    Expected End:  12/27/23            Fair (+) dynamic standing balance for ADL.  (Progressing)       Start:  12/13/23    Expected End:  12/27/23            Tolerate 8 mins light functional activity with stable vitals and without rest break.  (Progressing)       Start:  12/13/23    Expected End:  12/27/23

## 2023-12-14 NOTE — PROGRESS NOTES
Referral to [facility] [place] [facility] was made, patient accepted and can go once he is medically cleared.

## 2023-12-14 NOTE — PROGRESS NOTES
"Vinicius Arriola is a 88 y.o. male on day 4 of admission presenting with COVID.    Subjective   Patient seen and examined.  Says that he feels better compared to yesterday, currently comfortable, denying any chest pain, cough or phlegm, nausea vomiting or fever or chills.  On oxygen.       Objective     Physical Exam  Constitutional:       General: He is not in acute distress.     Appearance: He is obese. He is not toxic-appearing.   HENT:      Head: Normocephalic and atraumatic.   Eyes:      Extraocular Movements: Extraocular movements intact.      Conjunctiva/sclera: Conjunctivae normal.   Cardiovascular:      Rate and Rhythm: Normal rate and regular rhythm.      Pulses: Normal pulses.   Pulmonary:      Comments: Decreased breathing sounds bilaterally, on nasal cannula oxygen  Abdominal:      General: There is distension.      Palpations: Abdomen is soft.      Tenderness: There is no abdominal tenderness.   Musculoskeletal:         General: Deformity present.      Cervical back: No rigidity or tenderness.   Neurological:      General: No focal deficit present.      Mental Status: He is alert.   Last Recorded Vitals  Blood pressure 111/76, pulse 95, temperature 36.6 °C (97.9 °F), resp. rate 22, height 1.727 m (5' 8\"), weight 115 kg (254 lb 3.1 oz), SpO2 94 %.  Intake/Output last 3 Shifts:  No intake/output data recorded.    Relevant Results                             Assessment/Plan   Principal Problem:    COVID    88-year-old male with past medical history of mesothelioma, sleep apnea, obesity, CKD admitted with acute hypoxemic respiratory failure most likely secondary to progression of mesothelioma, possible pneumonia, elevated creatinine, hyponatremia with positive COVID     His acute hypoxemic respiratory failure and chest imaging changes are not related to COVID  His mesothelioma has been progressively getting worse  There is a possibility of superimposed pneumonia  Started on Levaquin, Pro-Kevin was 0.03 can " possibly discontinue Levaquin  Continue DuoNebs, CPAP at night  Switch to oral steroids prednisone 40 mg daily x 5 days  Will order home O2 eval  Palliative care has been consulted, did have a detailed goals of care discussion with the patient and wife and they seem to understand  Pulmonology on board  Creatinine has remained relatively stable, continue to monitor  INR is 3.3, keep off Coumadin, do daily INR  Continue statin, trazodone, metoprolol, iron supplementation  Seems like was started on p.o. Lasix by pulmonology  PT/OT eval  SNF placement  DVT prophylaxis        Ruiz Manzo MD

## 2023-12-14 NOTE — PROGRESS NOTES
Pt. Will discharge this date  to [facility] acute rehab at 6:00 pm via ambulance.  Pt. And spouse aware and in agreement to transfer.

## 2023-12-14 NOTE — PROGRESS NOTES
Patient is admitted with Covid, is on oxygen from home. Patient is weak and may require SNF for rehab. Per PCP, wife is agreeable to have patient go to SNF, will provide list for her to choose facility.

## 2023-12-15 VITALS
HEIGHT: 68 IN | DIASTOLIC BLOOD PRESSURE: 62 MMHG | OXYGEN SATURATION: 92 % | TEMPERATURE: 98.2 F | SYSTOLIC BLOOD PRESSURE: 108 MMHG | RESPIRATION RATE: 18 BRPM | WEIGHT: 254.19 LBS | BODY MASS INDEX: 38.52 KG/M2 | HEART RATE: 60 BPM

## 2023-12-15 LAB
ANION GAP SERPL CALC-SCNC: 9 MMOL/L (ref 10–20)
BUN SERPL-MCNC: 35 MG/DL (ref 6–23)
CALCIUM SERPL-MCNC: 8.5 MG/DL (ref 8.6–10.3)
CHLORIDE SERPL-SCNC: 103 MMOL/L (ref 98–107)
CO2 SERPL-SCNC: 29 MMOL/L (ref 21–32)
CREAT SERPL-MCNC: 1.2 MG/DL (ref 0.5–1.3)
ERYTHROCYTE [DISTWIDTH] IN BLOOD BY AUTOMATED COUNT: 15.4 % (ref 11.5–14.5)
GFR SERPL CREATININE-BSD FRML MDRD: 59 ML/MIN/1.73M*2
GLUCOSE SERPL-MCNC: 113 MG/DL (ref 74–99)
HCT VFR BLD AUTO: 41.2 % (ref 41–52)
HGB BLD-MCNC: 13.4 G/DL (ref 13.5–17.5)
HOLD SPECIMEN: NORMAL
INR PPP: 3 (ref 0.9–1.1)
MCH RBC QN AUTO: 27.7 PG (ref 26–34)
MCHC RBC AUTO-ENTMCNC: 32.5 G/DL (ref 32–36)
MCV RBC AUTO: 85 FL (ref 80–100)
NRBC BLD-RTO: 0.2 /100 WBCS (ref 0–0)
PLATELET # BLD AUTO: 217 X10*3/UL (ref 150–450)
POTASSIUM SERPL-SCNC: 4.3 MMOL/L (ref 3.5–5.3)
PROTHROMBIN TIME: 34.7 SECONDS (ref 9.8–12.8)
RBC # BLD AUTO: 4.83 X10*6/UL (ref 4.5–5.9)
SODIUM SERPL-SCNC: 137 MMOL/L (ref 136–145)
WBC # BLD AUTO: 11.4 X10*3/UL (ref 4.4–11.3)

## 2023-12-15 PROCEDURE — 2500000001 HC RX 250 WO HCPCS SELF ADMINISTERED DRUGS (ALT 637 FOR MEDICARE OP): Performed by: STUDENT IN AN ORGANIZED HEALTH CARE EDUCATION/TRAINING PROGRAM

## 2023-12-15 PROCEDURE — 36415 COLL VENOUS BLD VENIPUNCTURE: CPT | Performed by: INTERNAL MEDICINE

## 2023-12-15 PROCEDURE — 2500000004 HC RX 250 GENERAL PHARMACY W/ HCPCS (ALT 636 FOR OP/ED): Performed by: STUDENT IN AN ORGANIZED HEALTH CARE EDUCATION/TRAINING PROGRAM

## 2023-12-15 PROCEDURE — 85027 COMPLETE CBC AUTOMATED: CPT | Performed by: INTERNAL MEDICINE

## 2023-12-15 PROCEDURE — 85610 PROTHROMBIN TIME: CPT | Performed by: INTERNAL MEDICINE

## 2023-12-15 PROCEDURE — 80048 BASIC METABOLIC PNL TOTAL CA: CPT | Performed by: INTERNAL MEDICINE

## 2023-12-15 PROCEDURE — 99239 HOSP IP/OBS DSCHRG MGMT >30: CPT | Performed by: INTERNAL MEDICINE

## 2023-12-15 PROCEDURE — 2500000004 HC RX 250 GENERAL PHARMACY W/ HCPCS (ALT 636 FOR OP/ED): Performed by: INTERNAL MEDICINE

## 2023-12-15 PROCEDURE — 99222 1ST HOSP IP/OBS MODERATE 55: CPT | Performed by: STUDENT IN AN ORGANIZED HEALTH CARE EDUCATION/TRAINING PROGRAM

## 2023-12-15 RX ADMIN — ROSUVASTATIN CALCIUM 10 MG: 10 TABLET, FILM COATED ORAL at 08:42

## 2023-12-15 RX ADMIN — POLYETHYLENE GLYCOL 3350 17 G: 17 POWDER, FOR SOLUTION ORAL at 08:43

## 2023-12-15 RX ADMIN — POTASSIUM CHLORIDE 10 MEQ: 750 TABLET, EXTENDED RELEASE ORAL at 08:42

## 2023-12-15 RX ADMIN — FERROUS SULFATE TAB 325 MG (65 MG ELEMENTAL FE) 1 TABLET: 325 (65 FE) TAB at 08:41

## 2023-12-15 RX ADMIN — Medication 1000 MG: at 08:41

## 2023-12-15 RX ADMIN — FUROSEMIDE 20 MG: 40 TABLET ORAL at 08:39

## 2023-12-15 RX ADMIN — METOPROLOL TARTRATE 25 MG: 25 TABLET, FILM COATED ORAL at 08:42

## 2023-12-15 RX ADMIN — NYSTATIN 1 APPLICATION: 100000 POWDER TOPICAL at 08:45

## 2023-12-15 RX ADMIN — PREDNISONE 40 MG: 20 TABLET ORAL at 08:38

## 2023-12-15 NOTE — PROGRESS NOTES
"Vinicius Arriola is a 88 y.o. male on day 5 of admission presenting with COVID..And hypoxic respiratory failure.  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  Patient clinically improving for discharge.  Palliative care consult done and according to the note wife and patient agreed for DNR/DNI.  Plan to discharge today.  To rehab     Subjective   Patient states he is feeling better     Objective   DC to rehab patient improved clinically.  ROS  Review of Systems   Constitutional:  Positive for appetite change and fatigue.   HENT:  Positive for congestion.    Eyes: Negative.    Respiratory:  Positive for cough, shortness of breath and wheezing.    Gastrointestinal:  Positive for nausea and vomiting.   Endocrine: Negative.    Genitourinary: Negative.    Musculoskeletal:  Positive for arthralgias, joint swelling and myalgias.   Skin: Negative.    Allergic/Immunologic: Negative.    Neurological:  Positive for weakness.   Psychiatric/Behavioral:  Positive for sleep disturbance.    All other systems reviewed and are negative  Vital Signs  Blood pressure 108/62, pulse 60, temperature 36.8 °C (98.2 °F), temperature source Temporal, resp. rate 18, height 1.727 m (5' 8\"), weight 115 kg (254 lb 3.1 oz), SpO2 92 %.    Physical Exam   Vitals reviewed.   Constitutional:       Appearance: Normal appearance. He is obese.   HENT:      Head: Normocephalic and atraumatic.      Right Ear: Tympanic membrane and external ear normal.      Left Ear: Tympanic membrane and external ear normal.      Nose: Congestion present.      Mouth/Throat:      Mouth: Mucous membranes are dry.   Eyes:      Extraocular Movements: Extraocular movements intact.      Pupils: Pupils are equal, round, and reactive to light.   Cardiovascular:      Rate and Rhythm: Normal rate.      Pulses: Normal pulses.      Heart sounds: Normal heart sounds.   Pulmonary:      Breath sounds: Wheezing and rales present.   Abdominal:      Palpations: Abdomen is " soft.   Musculoskeletal:         General: Normal range of motion.      Cervical back: Normal range of motion.   Skin:     General: Skin is dry.      Capillary Refill: Capillary refill takes 2 to 3 seconds.   Neurological:      General: No focal deficit present.      Mental Status: He is alert and oriented to person, place, and time.   Psychiatric:         Mood and Affect: Mood normal.     Oxygen Therapy  SpO2: 92 %  Medical Gas Therapy: None (Room air)  O2 Delivery Method: Nasal cannula (3L)       Intake/Output  No intake/output data recorded.    Lines and Tubes:  Peripheral IV 12/10/23 20 G Right Antecubital (Active)   Placement Date/Time: 12/10/23 1127   Size (Gauge): 20 G  Orientation: Right  Location: Antecubital   Number of days: 5       Results for orders placed or performed during the hospital encounter of 12/10/23 (from the past 96 hour(s))   Basic metabolic panel   Result Value Ref Range    Glucose 145 (H) 74 - 99 mg/dL    Sodium 137 136 - 145 mmol/L    Potassium 4.9 3.5 - 5.3 mmol/L    Chloride 102 98 - 107 mmol/L    Bicarbonate 30 21 - 32 mmol/L    Anion Gap 10 10 - 20 mmol/L    Urea Nitrogen 32 (H) 6 - 23 mg/dL    Creatinine 1.36 (H) 0.50 - 1.30 mg/dL    eGFR 51 (L) >60 mL/min/1.73m*2    Calcium 9.6 8.6 - 10.3 mg/dL   CBC and Auto Differential   Result Value Ref Range    WBC 16.4 (H) 4.4 - 11.3 x10*3/uL    nRBC 0.0 0.0 - 0.0 /100 WBCs    RBC 4.81 4.50 - 5.90 x10*6/uL    Hemoglobin 13.6 13.5 - 17.5 g/dL    Hematocrit 42.0 41.0 - 52.0 %    MCV 87 80 - 100 fL    MCH 28.3 26.0 - 34.0 pg    MCHC 32.4 32.0 - 36.0 g/dL    RDW 15.5 (H) 11.5 - 14.5 %    Platelets 242 150 - 450 x10*3/uL    Neutrophils % 90.7 40.0 - 80.0 %    Immature Granulocytes %, Automated 0.5 0.0 - 0.9 %    Lymphocytes % 5.1 13.0 - 44.0 %    Monocytes % 3.6 2.0 - 10.0 %    Eosinophils % 0.0 0.0 - 6.0 %    Basophils % 0.1 0.0 - 2.0 %    Neutrophils Absolute 14.88 (H) 1.60 - 5.50 x10*3/uL    Immature Granulocytes Absolute, Automated 0.08 0.00 -  0.50 x10*3/uL    Lymphocytes Absolute 0.84 0.80 - 3.00 x10*3/uL    Monocytes Absolute 0.59 0.05 - 0.80 x10*3/uL    Eosinophils Absolute 0.00 0.00 - 0.40 x10*3/uL    Basophils Absolute 0.02 0.00 - 0.10 x10*3/uL   Protime-INR   Result Value Ref Range    Protime 52.1 (H) 9.8 - 12.8 seconds    INR 4.5 (H) 0.9 - 1.1   SST TOP   Result Value Ref Range    Extra Tube Hold for add-ons.    SST TOP   Result Value Ref Range    Extra Tube Hold for add-ons.    Basic metabolic panel   Result Value Ref Range    Glucose 153 (H) 74 - 99 mg/dL    Sodium 135 (L) 136 - 145 mmol/L    Potassium 4.4 3.5 - 5.3 mmol/L    Chloride 102 98 - 107 mmol/L    Bicarbonate 28 21 - 32 mmol/L    Anion Gap 9 (L) 10 - 20 mmol/L    Urea Nitrogen 33 (H) 6 - 23 mg/dL    Creatinine 1.28 0.50 - 1.30 mg/dL    eGFR 55 (L) >60 mL/min/1.73m*2    Calcium 9.0 8.6 - 10.3 mg/dL   CBC and Auto Differential   Result Value Ref Range    WBC 14.1 (H) 4.4 - 11.3 x10*3/uL    nRBC 0.0 0.0 - 0.0 /100 WBCs    RBC 4.62 4.50 - 5.90 x10*6/uL    Hemoglobin 13.1 (L) 13.5 - 17.5 g/dL    Hematocrit 40.1 (L) 41.0 - 52.0 %    MCV 87 80 - 100 fL    MCH 28.4 26.0 - 34.0 pg    MCHC 32.7 32.0 - 36.0 g/dL    RDW 15.6 (H) 11.5 - 14.5 %    Platelets 224 150 - 450 x10*3/uL    Neutrophils % 91.8 40.0 - 80.0 %    Immature Granulocytes %, Automated 1.0 (H) 0.0 - 0.9 %    Lymphocytes % 4.0 13.0 - 44.0 %    Monocytes % 3.1 2.0 - 10.0 %    Eosinophils % 0.0 0.0 - 6.0 %    Basophils % 0.1 0.0 - 2.0 %    Neutrophils Absolute 12.92 (H) 1.60 - 5.50 x10*3/uL    Immature Granulocytes Absolute, Automated 0.14 0.00 - 0.50 x10*3/uL    Lymphocytes Absolute 0.56 (L) 0.80 - 3.00 x10*3/uL    Monocytes Absolute 0.44 0.05 - 0.80 x10*3/uL    Eosinophils Absolute 0.00 0.00 - 0.40 x10*3/uL    Basophils Absolute 0.02 0.00 - 0.10 x10*3/uL   Protime-INR   Result Value Ref Range    Protime 44.4 (H) 9.8 - 12.8 seconds    INR 3.9 (H) 0.9 - 1.1   SST TOP   Result Value Ref Range    Extra Tube Hold for add-ons.    CBC    Result Value Ref Range    WBC 11.9 (H) 4.4 - 11.3 x10*3/uL    nRBC 0.0 0.0 - 0.0 /100 WBCs    RBC 4.77 4.50 - 5.90 x10*6/uL    Hemoglobin 13.6 13.5 - 17.5 g/dL    Hematocrit 41.2 41.0 - 52.0 %    MCV 86 80 - 100 fL    MCH 28.5 26.0 - 34.0 pg    MCHC 33.0 32.0 - 36.0 g/dL    RDW 15.3 (H) 11.5 - 14.5 %    Platelets 219 150 - 450 x10*3/uL   Basic Metabolic Panel   Result Value Ref Range    Glucose 153 (H) 74 - 99 mg/dL    Sodium 136 136 - 145 mmol/L    Potassium 4.4 3.5 - 5.3 mmol/L    Chloride 103 98 - 107 mmol/L    Bicarbonate 26 21 - 32 mmol/L    Anion Gap 11 10 - 20 mmol/L    Urea Nitrogen 36 (H) 6 - 23 mg/dL    Creatinine 1.25 0.50 - 1.30 mg/dL    eGFR 56 (L) >60 mL/min/1.73m*2    Calcium 8.6 8.6 - 10.3 mg/dL   Protime-INR   Result Value Ref Range    Protime 37.6 (H) 9.8 - 12.8 seconds    INR 3.3 (H) 0.9 - 1.1   SST TOP   Result Value Ref Range    Extra Tube Hold for add-ons.    CBC   Result Value Ref Range    WBC 11.4 (H) 4.4 - 11.3 x10*3/uL    nRBC 0.2 (H) 0.0 - 0.0 /100 WBCs    RBC 4.83 4.50 - 5.90 x10*6/uL    Hemoglobin 13.4 (L) 13.5 - 17.5 g/dL    Hematocrit 41.2 41.0 - 52.0 %    MCV 85 80 - 100 fL    MCH 27.7 26.0 - 34.0 pg    MCHC 32.5 32.0 - 36.0 g/dL    RDW 15.4 (H) 11.5 - 14.5 %    Platelets 217 150 - 450 x10*3/uL   Basic Metabolic Panel   Result Value Ref Range    Glucose 113 (H) 74 - 99 mg/dL    Sodium 137 136 - 145 mmol/L    Potassium 4.3 3.5 - 5.3 mmol/L    Chloride 103 98 - 107 mmol/L    Bicarbonate 29 21 - 32 mmol/L    Anion Gap 9 (L) 10 - 20 mmol/L    Urea Nitrogen 35 (H) 6 - 23 mg/dL    Creatinine 1.20 0.50 - 1.30 mg/dL    eGFR 59 (L) >60 mL/min/1.73m*2    Calcium 8.5 (L) 8.6 - 10.3 mg/dL   Protime-INR   Result Value Ref Range    Protime 34.7 (H) 9.8 - 12.8 seconds    INR 3.0 (H) 0.9 - 1.1      Relevant Results  Recent Results (from the past 24 hour(s))   SST TOP    Collection Time: 12/15/23  5:48 AM   Result Value Ref Range    Extra Tube Hold for add-ons.    CBC    Collection Time: 12/15/23   "5:49 AM   Result Value Ref Range    WBC 11.4 (H) 4.4 - 11.3 x10*3/uL    nRBC 0.2 (H) 0.0 - 0.0 /100 WBCs    RBC 4.83 4.50 - 5.90 x10*6/uL    Hemoglobin 13.4 (L) 13.5 - 17.5 g/dL    Hematocrit 41.2 41.0 - 52.0 %    MCV 85 80 - 100 fL    MCH 27.7 26.0 - 34.0 pg    MCHC 32.5 32.0 - 36.0 g/dL    RDW 15.4 (H) 11.5 - 14.5 %    Platelets 217 150 - 450 x10*3/uL   Basic Metabolic Panel    Collection Time: 12/15/23  5:49 AM   Result Value Ref Range    Glucose 113 (H) 74 - 99 mg/dL    Sodium 137 136 - 145 mmol/L    Potassium 4.3 3.5 - 5.3 mmol/L    Chloride 103 98 - 107 mmol/L    Bicarbonate 29 21 - 32 mmol/L    Anion Gap 9 (L) 10 - 20 mmol/L    Urea Nitrogen 35 (H) 6 - 23 mg/dL    Creatinine 1.20 0.50 - 1.30 mg/dL    eGFR 59 (L) >60 mL/min/1.73m*2    Calcium 8.5 (L) 8.6 - 10.3 mg/dL   Protime-INR    Collection Time: 12/15/23  5:49 AM   Result Value Ref Range    Protime 34.7 (H) 9.8 - 12.8 seconds    INR 3.0 (H) 0.9 - 1.1        Radiology:  CT chest wo IV contrast    Result Date: 12/10/2023  STUDY: CT Chest without IV Contrast; 12/10/2023, 2:54PM. INDICATION: Follow up to abnormal chest radiograph, \"Persistent dense consolidation throughout the left chest.  Mesothelioma. COMPARISON: Chest x-ray 12/10/2023 and 2/17/2023.  CT chest 3/21/2023 and 6/14/2023 ACCESSION NUMBER(S): [id number] ORDERING CLINICIAN: ELIEL MENDIOLA TECHNIQUE:  CT of the chest was performed without contrast.  Automated mA/kV exposure control was utilized and patient examination was performed in strict accordance with principles of ALARA. FINDINGS: MEDIASTINUM: Shift of the mediastinal structures towards the left hemithorax. Cardiac size is stable.  Pacemaker leads are present.  The unenhanced thoracic aorta shows no evidence of aneurysm.  Small mediastinal lymph nodes.  Largest lymph node is a prevascular AP window lymph node on image 24 series 201 measuring 1.3 cm and unchanged. LUNGS/PLEURA: Very small left pleural effusion.  The airways are patent. The " "right lung is clear.  Diffuse airspace densities with air bronchograms left hemithorax. LYMPH NODES: As above. UPPER ABDOMEN: Cholelithiasis. BONES: There are no acute fractures.  No suspicious bony lesions.    There are chronic consolidative changes throughout the left upper lobe and left lower lobe.  Overall the consolidative changes have progressed slightly since CT chest 3/21/2023 and 6/14/2023.  There is chronic volume loss left hemithorax with shift of the mediastinal structures towards the left.  The patient has a history of mesothelioma.  The chronic consolidative changes throughout the left lung could be due to pneumonitis if the patient has had prior radiation therapy.  Infection/pneumonia cannot be completely excluded.  The right lung is clear. Mild mediastinal lymphadenopathy is stable. Cholelithiasis. Signed by Maicol Tinoco MD    XR chest 1 view    Result Date: 12/10/2023  STUDY: Chest Radiograph;  12/10/2023 12:18PM INDICATION: Shortness of breath. COMPARISON: XR Chest 02/17/2023 ACCESSION NUMBER(S): [id number] ORDERING CLINICIAN: ELIEL MENDIOLA TECHNIQUE:  Frontal chest (two images) was obtained at 12:17 hours. FINDINGS: Dense consolidation persists throughout the left chest. Right chest remains clear. Mediastinal structures are shifted to the left. Right pacemaker remains in place. There is no pneumothorax.    Persistent dense consolidation throughout the left chest. Remainder as above. Signed by Angel Rojas MD   CT chest wo IV contrast    Result Date: 12/10/2023  STUDY: CT Chest without IV Contrast; 12/10/2023, 2:54PM. INDICATION: Follow up to abnormal chest radiograph, \"Persistent dense consolidation throughout the left chest.  Mesothelioma. COMPARISON: Chest x-ray 12/10/2023 and 2/17/2023.  CT chest 3/21/2023 and 6/14/2023 ACCESSION NUMBER(S): [id number] ORDERING CLINICIAN: ELIEL MENDIOLA TECHNIQUE:  CT of the chest was performed without contrast.  Automated mA/kV exposure control was utilized " and patient examination was performed in strict accordance with principles of ALARA. FINDINGS: MEDIASTINUM: Shift of the mediastinal structures towards the left hemithorax. Cardiac size is stable.  Pacemaker leads are present.  The unenhanced thoracic aorta shows no evidence of aneurysm.  Small mediastinal lymph nodes.  Largest lymph node is a prevascular AP window lymph node on image 24 series 201 measuring 1.3 cm and unchanged. LUNGS/PLEURA: Very small left pleural effusion.  The airways are patent. The right lung is clear.  Diffuse airspace densities with air bronchograms left hemithorax. LYMPH NODES: As above. UPPER ABDOMEN: Cholelithiasis. BONES: There are no acute fractures.  No suspicious bony lesions.    There are chronic consolidative changes throughout the left upper lobe and left lower lobe.  Overall the consolidative changes have progressed slightly since CT chest 3/21/2023 and 6/14/2023.  There is chronic volume loss left hemithorax with shift of the mediastinal structures towards the left.  The patient has a history of mesothelioma.  The chronic consolidative changes throughout the left lung could be due to pneumonitis if the patient has had prior radiation therapy.  Infection/pneumonia cannot be completely excluded.  The right lung is clear. Mild mediastinal lymphadenopathy is stable. Cholelithiasis. Signed by Maicol Tinoco MD    XR chest 1 view    Result Date: 12/10/2023  STUDY: Chest Radiograph;  12/10/2023 12:18PM INDICATION: Shortness of breath. COMPARISON: XR Chest 02/17/2023 ACCESSION NUMBER(S): [id number] ORDERING CLINICIAN: ELIEL MENDIOLA TECHNIQUE:  Frontal chest (two images) was obtained at 12:17 hours. FINDINGS: Dense consolidation persists throughout the left chest. Right chest remains clear. Mediastinal structures are shifted to the left. Right pacemaker remains in place. There is no pneumothorax.    Persistent dense consolidation throughout the left chest. Remainder as above. Signed by  Angel Rojas MD    ECG 12 lead (Clinic Performed)    Result Date: 12/6/2023  EKG performed today shows atrial paced rhythm rate of 66 bpm rotation 74 ms QT corrected 423 ms.  Rhythm strip shows the same pattern.      Current Facility-Administered Medications:     acetaminophen (Tylenol) tablet 650 mg, 650 mg, oral, q4h PRN **OR** acetaminophen (Tylenol) oral liquid 650 mg, 650 mg, nasogastric tube, q4h PRN **OR** acetaminophen (Tylenol) suppository 650 mg, 650 mg, rectal, q4h PRN, Abraham Pugh MD    acetaminophen (Tylenol) tablet 650 mg, 650 mg, oral, q4h PRN, 650 mg at 12/10/23 2247 **OR** acetaminophen (Tylenol) oral liquid 650 mg, 650 mg, oral, q4h PRN **OR** acetaminophen (Tylenol) suppository 650 mg, 650 mg, rectal, q4h PRN, Abraham Pugh MD    ascorbic acid (Vitamin C) tablet 1,000 mg, 1,000 mg, oral, Daily, Abraham Pugh MD, 1,000 mg at 12/15/23 0841    benzocaine-menthol (Cepastat Sore Throat) 15-3.6 mg lozenge 1 lozenge, 1 lozenge, Mouth/Throat, q2h PRN, Abraham Pugh MD    dextromethorphan-guaifenesin (Robitussin DM)  mg/5 mL oral liquid 5 mL, 5 mL, oral, q4h PRN, Abraham Pugh MD    ferrous sulfate (325 mg ferrous sulfate) tablet 1 tablet, 65 mg of iron, oral, BID with meals, Abraham Pugh MD, 1 tablet at 12/15/23 0841    furosemide (Lasix) tablet 20 mg, 20 mg, oral, Daily, Abraham Pugh MD, 20 mg at 12/15/23 0839    ipratropium-albuteroL (Duo-Neb) 0.5-2.5 mg/3 mL nebulizer solution 3 mL, 3 mL, nebulization, q2h PRN, Abraham Pugh MD    lubricating eye drops ophthalmic solution 1 drop, 1 drop, Both Eyes, TID PRN, Abraham Pugh MD    melatonin tablet 5 mg, 5 mg, oral, Nightly PRN, Abraham Pugh MD    metoprolol tartrate (Lopressor) tablet 25 mg, 25 mg, oral, BID, Abraham Pugh MD, 25 mg at 12/15/23 0842    nitroglycerin (Nitrostat) SL tablet 0.4 mg, 0.4 mg, sublingual, q5 min PRN, Abraham Pugh MD    nystatin (Mycostatin) 100,000 unit/gram powder 1  Application, 1 Application, Topical, BID, Vonnie Fitzpatrick, APRN-CNP, 1 Application at 12/15/23 0845    ondansetron (Zofran) tablet 4 mg, 4 mg, oral, q8h PRN **OR** ondansetron (Zofran) injection 4 mg, 4 mg, intravenous, q8h PRN, Abraham Pugh MD    polyethylene glycol (Glycolax, Miralax) packet 17 g, 17 g, oral, Daily, Abraham Pugh MD, 17 g at 12/15/23 0843    potassium chloride CR (Klor-Con) ER tablet 10 mEq, 10 mEq, oral, Daily, Abraham Pugh MD, 10 mEq at 12/15/23 0842    predniSONE (Deltasone) tablet 40 mg, 40 mg, oral, Daily, Ruiz Manzo MD, 40 mg at 12/15/23 0838    rosuvastatin (Crestor) tablet 10 mg, 10 mg, oral, Daily, Abraham Pugh MD, 10 mg at 12/15/23 0842    traZODone (Desyrel) tablet 100 mg, 100 mg, oral, Nightly, Abraham Pugh MD, 100 mg at 12/14/23 2007   Principal Problem:    COVID      Assessment/Plan      Acute hypoxic respiratory failure continue oxygen keeping saturation more than 90% patient on 3 L oxygen nasal cannula.  Patient O2 saturation on room air was 84% on arrival.  And at home as well.  History of mesothelioma s/p left VATS and pleurodesis and s/p radiation therapy for mesothelioma.  Mesothelioma getting worse for many months.  Patient and family aware and discussed with them.  Consider palliative care.  Case discussed with patient and wife.  And ER physician.  Hospitalist updated about this mesothelioma diagnosis.  Consider changing the CODE STATUS to DNR.  Left-sided consolidation and volume loss is secondary to mesothelioma, atelectasis, infiltrate cannot be ruled out agree with antibiotic.  Covid-19 infection.  COPD exacerbation in a former smoker.  Continue bronchodilator as ordered.  Sleep apnea patient on BiPAP at home.  Morbid obesity patient advised reduce weight by diet exercise.  Hypertension.  Dyslipidemia.  Coronary disease PTCA in the remote past.  History of pulmonary embolism and DVT in the past.  Paroxysmal atrial fibrillation  history.  Sick sinus syndrome and pacemaker placement.  Chronic kidney disease.  BPH.  EtOH counseling done.  DVT prophylaxis.  PT OT.  P.o. diet.  Palliative care consulted.  Patient clinically improving.  Palliative care patient wants for home.  Wife is sick at home.  Today wife visited the patient and palliative nurse practitioner discussed the palliative care and made the patient DNR/DNI after discussing with the patient and wife.  Patient improved clinically discharge to rehab follow-up in the office.       Luis Villasenor MD

## 2023-12-21 ENCOUNTER — HOME HEALTH ADMISSION (OUTPATIENT)
Dept: HOME HEALTH SERVICES | Facility: HOME HEALTH | Age: 88
End: 2023-12-21
Payer: MEDICARE

## 2023-12-21 ENCOUNTER — DOCUMENTATION (OUTPATIENT)
Dept: HOME HEALTH SERVICES | Facility: HOME HEALTH | Age: 88
End: 2023-12-21
Payer: MEDICARE

## 2023-12-21 NOTE — HH CARE COORDINATION
Home Care received a Referral for Nursing, Physical Therapy, and Occupational Therapy. We have processed the referral for a Start of Care on 12/24-12/25.     If you have any questions or concerns, please feel free to contact us at [phone]. Follow the prompts, enter your five digit zip code, and you will be directed to your care team on [facility].

## 2023-12-24 ENCOUNTER — HOME CARE VISIT (OUTPATIENT)
Dept: HOME HEALTH SERVICES | Facility: HOME HEALTH | Age: 88
End: 2023-12-24
Payer: MEDICARE

## 2023-12-24 VITALS
HEART RATE: 60 BPM | TEMPERATURE: 98.6 F | RESPIRATION RATE: 20 BRPM | DIASTOLIC BLOOD PRESSURE: 78 MMHG | SYSTOLIC BLOOD PRESSURE: 120 MMHG | OXYGEN SATURATION: 95 %

## 2023-12-24 PROCEDURE — 1090000001 HH PPS REVENUE CREDIT

## 2023-12-24 PROCEDURE — 1090000002 HH PPS REVENUE DEBIT

## 2023-12-24 PROCEDURE — 0023 HH SOC

## 2023-12-24 PROCEDURE — G0299 HHS/HOSPICE OF RN EA 15 MIN: HCPCS | Mod: HHH

## 2023-12-24 PROCEDURE — 169592 NO-PAY CLAIM PROCEDURE

## 2023-12-24 ASSESSMENT — ENCOUNTER SYMPTOMS
SHORTNESS OF BREATH: 1
PERSON REPORTING PAIN: PATIENT
LOWER EXTREMITY EDEMA: 1
DENIES PAIN: 1

## 2023-12-24 NOTE — HOME HEALTH
SOC completed on pt with recent hospitalization for sob/cough, pt was diagnosed with covid pneumonia, hx mesothiolima. VSS, no c/o pain, sob and cough greatly improved. Med profile and allergies reviewed and updated, no discrepancies found.  PT/OT to evaluate.  Nursing will follow pt for 2 weeks to ensure no new or worsening respiratory symptoms.  No further needs at this visit.

## 2023-12-25 PROCEDURE — 1090000002 HH PPS REVENUE DEBIT

## 2023-12-25 PROCEDURE — 1090000001 HH PPS REVENUE CREDIT

## 2023-12-26 PROBLEM — Z85.831: Status: ACTIVE | Noted: 2023-12-26

## 2023-12-26 PROBLEM — R53.1 GENERALIZED WEAKNESS: Status: ACTIVE | Noted: 2023-12-26

## 2023-12-26 PROBLEM — J96.01 ACUTE RESPIRATORY FAILURE WITH HYPOXIA (MULTI): Status: ACTIVE | Noted: 2023-12-26

## 2023-12-26 PROBLEM — Z74.09 IMPAIRED MOBILITY AND ADLS: Status: ACTIVE | Noted: 2023-12-26

## 2023-12-26 PROBLEM — Z78.9 IMPAIRED MOBILITY AND ADLS: Status: ACTIVE | Noted: 2023-12-26

## 2023-12-26 PROCEDURE — 1090000001 HH PPS REVENUE CREDIT

## 2023-12-26 PROCEDURE — 1090000002 HH PPS REVENUE DEBIT

## 2023-12-27 ENCOUNTER — HOME CARE VISIT (OUTPATIENT)
Dept: HOME HEALTH SERVICES | Facility: HOME HEALTH | Age: 88
End: 2023-12-27
Payer: MEDICARE

## 2023-12-27 VITALS — OXYGEN SATURATION: 97 % | HEART RATE: 61 BPM

## 2023-12-27 PROCEDURE — 1090000001 HH PPS REVENUE CREDIT

## 2023-12-27 PROCEDURE — 1090000002 HH PPS REVENUE DEBIT

## 2023-12-27 PROCEDURE — G0152 HHCP-SERV OF OT,EA 15 MIN: HCPCS | Mod: HHH

## 2023-12-27 ASSESSMENT — ACTIVITIES OF DAILY LIVING (ADL)
OASIS_M1830: 03
ENTERING_EXITING_HOME: NEEDS ASSISTANCE

## 2023-12-27 ASSESSMENT — ENCOUNTER SYMPTOMS
APPETITE LEVEL: GOOD
DENIES PAIN: 1
CHANGE IN APPETITE: UNCHANGED
MUSCLE WEAKNESS: 1

## 2023-12-28 ENCOUNTER — HOME CARE VISIT (OUTPATIENT)
Dept: HOME HEALTH SERVICES | Facility: HOME HEALTH | Age: 88
End: 2023-12-28
Payer: MEDICARE

## 2023-12-28 ENCOUNTER — ANTICOAGULATION - WARFARIN VISIT (OUTPATIENT)
Dept: CARDIOLOGY | Facility: CLINIC | Age: 88
End: 2023-12-28
Payer: MEDICARE

## 2023-12-28 VITALS
TEMPERATURE: 97.5 F | HEART RATE: 75 BPM | DIASTOLIC BLOOD PRESSURE: 62 MMHG | RESPIRATION RATE: 16 BRPM | SYSTOLIC BLOOD PRESSURE: 106 MMHG | OXYGEN SATURATION: 98 %

## 2023-12-28 PROCEDURE — 1090000003 HH PPS REVENUE ADJ

## 2023-12-28 PROCEDURE — G0151 HHCP-SERV OF PT,EA 15 MIN: HCPCS | Mod: HHH

## 2023-12-28 PROCEDURE — 1090000002 HH PPS REVENUE DEBIT

## 2023-12-28 PROCEDURE — 1090000001 HH PPS REVENUE CREDIT

## 2023-12-28 SDOH — HEALTH STABILITY: PHYSICAL HEALTH: EXERCISE ACTIVITY: MARCHING

## 2023-12-28 SDOH — ECONOMIC STABILITY: HOUSING INSECURITY
HOME SAFETY: UED OT SERVCIES 1W2, 2W1 TO ADDRESS EC/WS TECHNIQUES, HOME SAFETY AND UE STRENGTHENING AND HEP FOR CARRYOVER WITH FUNCTIONAL PERFORMANCE AND INDEPENDENCE WITH ADLS, TRANSFERS AND MOBILITY.

## 2023-12-28 SDOH — HEALTH STABILITY: PHYSICAL HEALTH: EXERCISE ACTIVITY: LAQ

## 2023-12-28 SDOH — HEALTH STABILITY: PHYSICAL HEALTH: EXERCISE ACTIVITIES SETS: 1

## 2023-12-28 SDOH — HEALTH STABILITY: PHYSICAL HEALTH: PHYSICAL EXERCISE: 15

## 2023-12-28 SDOH — HEALTH STABILITY: PHYSICAL HEALTH: PHYSICAL EXERCISE: SEATED

## 2023-12-28 SDOH — ECONOMIC STABILITY: HOUSING INSECURITY

## 2023-12-28 SDOH — HEALTH STABILITY: PHYSICAL HEALTH: EXERCISE TYPE: LE EXERCISES

## 2023-12-28 SDOH — ECONOMIC STABILITY: HOUSING INSECURITY
HOME SAFETY: ER CHAIR IF NEEDED, WALK IN SHOWER. PATIENT EXHIBITING INCREASED SOB WITH MINIMAL ACTIVITY, DECREASED STRENGTH AND OVERALL ENDURANCE WITH ADLS, TRANSFERS AND MOBILITY. PATIENTS SP02 REMAINING 95%-98% WITIH ACTIVITY.   PATIENT WILL BENEFIT FROM CONTIN

## 2023-12-28 SDOH — HEALTH STABILITY: PHYSICAL HEALTH: EXERCISE ACTIVITY: ANKLE DF/PF

## 2023-12-28 ASSESSMENT — BALANCE ASSESSMENTS
NUDGED SCORE: 0
EYES CLOSED AT MAXIMUM POSITION NUDGED: 0 - UNSTEADY
ATTEMPTS TO ARISE: 1 - ABLE, REQUIRES MORE THAN ONE ATTEMPT
ARISING SCORE: 1
TURNING 360 DEGREES STEPS: 1 - CONTINUOUS STEPS
ARISES: 1 - ABLE, USES ARMS TO HELP
SITTING DOWN: 1 - USES ARMS OR NOT SMOOTH MOTION
IMMEDIATE STANDING BALANCE FIRST 5 SECONDS: 1 - STEADY BUT USES WALKER OR OTHER SUPPORT
NUDGED: 0 - BEGINS TO FALL
SITTING BALANCE: 1 - STEADY, SAFE
STANDING BALANCE: 1 - STEADY BUT WIDE STANCE AND USES CANE OR OTHER SUPPORT
BALANCE SCORE: 8

## 2023-12-28 ASSESSMENT — GAIT ASSESSMENTS
WALKING STANCE: 1 - HEELS ALMOST TOUCHING WHILE WALKING
TRUNK: 0 - MARKED SWAY OR USES WALKING AID
INITIATION OF GAIT IMMEDIATELY AFTER GO: 1 - NO HESITANCY
GAIT SCORE: 8
STEP CONTINUITY: 1 - STEPS APPEAR CONTINUOUS
STEP SYMMETRY: 1 - RIGHT AND LEFT STEP LENGTH APPEAR EQUAL
PATH: 0 - MARKED DEVIATION
PATH SCORE: 0
BALANCE AND GAIT SCORE: 16
TRUNK SCORE: 0

## 2023-12-28 ASSESSMENT — ACTIVITIES OF DAILY LIVING (ADL)
AMBULATION_DISTANCE/DURATION_TOLERATED: 50 FT X2
WASHING_UPB_CURRENT_FUNCTION: SUPERVISION
AMBULATION ASSISTANCE ON FLAT SURFACES: 1
AMBULATION ASSISTANCE: STAND BY ASSIST
DRESSING_LB_CURRENT_FUNCTION: SUPERVISION
WASHING_LB_CURRENT_FUNCTION: SUPERVISION
AMBULATION ASSISTANCE: SUPERVISION
AMBULATION ASSISTANCE: 1
CURRENT_FUNCTION: STAND BY ASSIST

## 2023-12-28 ASSESSMENT — ENCOUNTER SYMPTOMS
DENIES PAIN: 1
OCCASIONAL FEELINGS OF UNSTEADINESS: 1
MUSCLE WEAKNESS: 1
PERSON REPORTING PAIN: PATIENT

## 2024-01-04 ENCOUNTER — HOME CARE VISIT (OUTPATIENT)
Dept: HOME HEALTH SERVICES | Facility: HOME HEALTH | Age: 89
End: 2024-01-04
Payer: MEDICARE

## 2024-01-04 ENCOUNTER — ANTICOAGULATION - WARFARIN VISIT (OUTPATIENT)
Dept: CARDIOLOGY | Facility: CLINIC | Age: 89
End: 2024-01-04
Payer: MEDICARE

## 2024-01-04 ASSESSMENT — ACTIVITIES OF DAILY LIVING (ADL)
HOME_HEALTH_OASIS: 01
OASIS_M1830: 03

## 2024-01-09 LAB
INR IN PPP BY COAGULATION ASSAY EXTERNAL: 2.2
PROTHROMBIN TIME (PT) IN PPP BY COAGULATION ASSAY EXTERNAL: NORMAL SECONDS

## 2024-01-10 ENCOUNTER — ANTICOAGULATION - WARFARIN VISIT (OUTPATIENT)
Dept: CARDIOLOGY | Facility: CLINIC | Age: 89
End: 2024-01-10

## 2024-01-16 ENCOUNTER — ANTICOAGULATION - WARFARIN VISIT (OUTPATIENT)
Dept: CARDIOLOGY | Facility: CLINIC | Age: 89
End: 2024-01-16

## 2024-01-16 LAB
INR IN PPP BY COAGULATION ASSAY EXTERNAL: 2.3
PROTHROMBIN TIME (PT) IN PPP BY COAGULATION ASSAY EXTERNAL: NORMAL SECONDS

## 2024-01-23 ENCOUNTER — ANTICOAGULATION - WARFARIN VISIT (OUTPATIENT)
Dept: CARDIOLOGY | Facility: CLINIC | Age: 89
End: 2024-01-23
Payer: MEDICARE

## 2024-01-23 LAB
INR IN PPP BY COAGULATION ASSAY EXTERNAL: 3.9
PROTHROMBIN TIME (PT) IN PPP BY COAGULATION ASSAY EXTERNAL: NORMAL SECONDS

## 2024-01-30 ENCOUNTER — ANTICOAGULATION - WARFARIN VISIT (OUTPATIENT)
Dept: CARDIOLOGY | Facility: CLINIC | Age: 89
End: 2024-01-30
Payer: MEDICARE

## 2024-01-30 LAB
INR IN PPP BY COAGULATION ASSAY EXTERNAL: 4
PROTHROMBIN TIME (PT) IN PPP BY COAGULATION ASSAY EXTERNAL: NORMAL SECONDS

## 2024-02-07 ENCOUNTER — ANTICOAGULATION - WARFARIN VISIT (OUTPATIENT)
Dept: CARDIOLOGY | Facility: CLINIC | Age: 89
End: 2024-02-07
Payer: MEDICARE

## 2024-02-07 LAB
INR IN PPP BY COAGULATION ASSAY EXTERNAL: 1.6
PROTHROMBIN TIME (PT) IN PPP BY COAGULATION ASSAY EXTERNAL: NORMAL SECONDS

## 2024-02-13 ENCOUNTER — ANTICOAGULATION - WARFARIN VISIT (OUTPATIENT)
Dept: CARDIOLOGY | Facility: CLINIC | Age: 89
End: 2024-02-13
Payer: MEDICARE

## 2024-02-13 LAB
INR IN PPP BY COAGULATION ASSAY EXTERNAL: 1.54
PROTHROMBIN TIME (PT) IN PPP BY COAGULATION ASSAY EXTERNAL: NORMAL SECONDS

## 2024-02-15 DIAGNOSIS — I48.0 PAROXYSMAL ATRIAL FIBRILLATION (MULTI): ICD-10-CM

## 2024-02-15 DIAGNOSIS — Z79.01 LONG TERM (CURRENT) USE OF ANTICOAGULANTS: ICD-10-CM

## 2024-02-15 RX ORDER — WARFARIN 1 MG/1
1-2 TABLET ORAL SEE ADMIN INSTRUCTIONS
Qty: 90 TABLET | Refills: 1 | Status: SHIPPED | OUTPATIENT
Start: 2024-02-15 | End: 2024-02-16 | Stop reason: SDUPTHER

## 2024-02-16 ENCOUNTER — OFFICE VISIT (OUTPATIENT)
Dept: CARDIOLOGY | Facility: CLINIC | Age: 89
End: 2024-02-16
Payer: MEDICARE

## 2024-02-16 VITALS
HEART RATE: 88 BPM | SYSTOLIC BLOOD PRESSURE: 134 MMHG | HEIGHT: 68 IN | BODY MASS INDEX: 37.95 KG/M2 | WEIGHT: 250.4 LBS | DIASTOLIC BLOOD PRESSURE: 82 MMHG

## 2024-02-16 DIAGNOSIS — Z79.01 LONG TERM (CURRENT) USE OF ANTICOAGULANTS: ICD-10-CM

## 2024-02-16 DIAGNOSIS — I73.9 INTERMITTENT CLAUDICATION (CMS-HCC): ICD-10-CM

## 2024-02-16 DIAGNOSIS — I10 BENIGN ESSENTIAL HYPERTENSION: ICD-10-CM

## 2024-02-16 DIAGNOSIS — Z86.73 HX-TIA (TRANSIENT ISCHEMIC ATTACK): ICD-10-CM

## 2024-02-16 DIAGNOSIS — E66.9 CLASS 2 OBESITY WITH BODY MASS INDEX (BMI) OF 38.0 TO 38.9 IN ADULT, UNSPECIFIED OBESITY TYPE, UNSPECIFIED WHETHER SERIOUS COMORBIDITY PRESENT: ICD-10-CM

## 2024-02-16 DIAGNOSIS — I70.213 ATHEROSCLEROSIS OF NATIVE ARTERY OF BOTH LOWER EXTREMITIES WITH INTERMITTENT CLAUDICATION (CMS-HCC): ICD-10-CM

## 2024-02-16 DIAGNOSIS — G47.30 SLEEP APNEA, UNSPECIFIED TYPE: ICD-10-CM

## 2024-02-16 DIAGNOSIS — Z87.891 FORMER SMOKER: ICD-10-CM

## 2024-02-16 DIAGNOSIS — I87.2 VENOUS INSUFFICIENCY: ICD-10-CM

## 2024-02-16 DIAGNOSIS — E78.2 MIXED HYPERLIPIDEMIA: ICD-10-CM

## 2024-02-16 DIAGNOSIS — I25.10 ATHEROSCLEROSIS OF NATIVE CORONARY ARTERY OF NATIVE HEART WITHOUT ANGINA PECTORIS: ICD-10-CM

## 2024-02-16 DIAGNOSIS — I48.0 PAROXYSMAL ATRIAL FIBRILLATION (MULTI): ICD-10-CM

## 2024-02-16 PROCEDURE — 1126F AMNT PAIN NOTED NONE PRSNT: CPT | Performed by: INTERNAL MEDICINE

## 2024-02-16 PROCEDURE — 3079F DIAST BP 80-89 MM HG: CPT | Performed by: INTERNAL MEDICINE

## 2024-02-16 PROCEDURE — 1157F ADVNC CARE PLAN IN RCRD: CPT | Performed by: INTERNAL MEDICINE

## 2024-02-16 PROCEDURE — 1036F TOBACCO NON-USER: CPT | Performed by: INTERNAL MEDICINE

## 2024-02-16 PROCEDURE — 3075F SYST BP GE 130 - 139MM HG: CPT | Performed by: INTERNAL MEDICINE

## 2024-02-16 PROCEDURE — 1159F MED LIST DOCD IN RCRD: CPT | Performed by: INTERNAL MEDICINE

## 2024-02-16 PROCEDURE — 99214 OFFICE O/P EST MOD 30 MIN: CPT | Performed by: INTERNAL MEDICINE

## 2024-02-16 RX ORDER — WARFARIN 1 MG/1
1-2 TABLET ORAL SEE ADMIN INSTRUCTIONS
Qty: 90 TABLET | Refills: 0 | Status: SHIPPED | OUTPATIENT
Start: 2024-02-16 | End: 2024-02-28 | Stop reason: SDUPTHER

## 2024-02-16 RX ORDER — POTASSIUM CHLORIDE 750 MG/1
10 TABLET, FILM COATED, EXTENDED RELEASE ORAL DAILY
Qty: 90 TABLET | Refills: 3 | Status: SHIPPED | OUTPATIENT
Start: 2024-02-16 | End: 2024-06-11 | Stop reason: HOSPADM

## 2024-02-16 RX ORDER — WARFARIN 4 MG/1
TABLET ORAL
Qty: 90 TABLET | Refills: 0 | Status: SHIPPED | OUTPATIENT
Start: 2024-02-16 | End: 2024-06-11 | Stop reason: HOSPADM

## 2024-02-16 NOTE — PATIENT INSTRUCTIONS
Patient to follow up in 6 months with Dr. Tina Henderson MD [facility]     Please contact Dr. Roslyn MD in regards to management of your Vitamin D and Iron Supplements.     No other changes today.   Continue same medications and treatments.   Patient educated on proper medication use.   Patient educated on risk factor modification.   Please bring any lab results from other providers / physicians to your next appointment.     Please bring all medicines, vitamins, and herbal supplements with you when you come to the office.     Prescriptions will not be filled unless you are compliant with your follow up appointments or have a follow up appointment scheduled as per instruction of your physician. Refills should be requested at the time of your visit.    Irineo LEON RN am scribing for and in the presence of Dr[facility]

## 2024-02-16 NOTE — PROGRESS NOTES
Referred by Dr. Anderson ref. provider found provider found for   Chief Complaint   Patient presents with    Follow-up     6 month follow up        History of Present Illness  Vinicius Arriola is a 88 y.o. year old male patient doing well from a cardiac standpoint no complaint no symptoms of chest pain or shortness of breath had history of hyper tension history of atrial fibrillation currently on warfarin.  Did not have any symptoms of palpitations syncope or presyncope.  I had a lengthy discussion with the patient today about his symptoms we will continue and refill the medication.  Advised him to check with his primary care physician regarding taking 50,000 of vitamin D weekly for a long time.  He may need a vitamin D check and may be cut back the dose.  He will follow-up with me as scheduled    Past Medical History  Past Medical History:   Diagnosis Date    Acute embolism and thrombosis of unspecified deep veins of unspecified lower extremity (CMS/HCC)     Acute embolism and thrombosis of unspecified deep veins of unspecified lower extremity    Dental disease     Upper and lower dentures    Encounter for preprocedural cardiovascular examination 11/02/2021    Pre-operative cardiovascular examination    Obesity, unspecified 07/15/2022    Class 2 obesity with body mass index (BMI) of 38.0 to 38.9 in adult    Personal history of diseases of the blood and blood-forming organs and certain disorders involving the immune mechanism     History of polycythemia    Personal history of other diseases of male genital organs     History of benign prostatic hyperplasia    Personal history of other diseases of the circulatory system     History of hypertension    Personal history of other diseases of the circulatory system     History of cardiac arrhythmia    Personal history of other diseases of the circulatory system 10/21/2021    History of abnormal electrocardiography    Personal history of other diseases of the circulatory system      History of essential hypertension    Personal history of other diseases of the nervous system and sense organs     History of sleep apnea    Personal history of other malignant neoplasm of skin     History of malignant neoplasm of skin    Personal history of other specified conditions     History of balance disorder    Personal history of other venous thrombosis and embolism     History of deep venous thrombosis       Social History  Social History     Tobacco Use    Smoking status: Former     Packs/day: 1.00     Years: 10.00     Additional pack years: 0.00     Total pack years: 10.00     Types: Cigarettes     Start date:      Quit date:      Years since quittin.1    Smokeless tobacco: Never   Vaping Use    Vaping Use: Never used   Substance Use Topics    Alcohol use: Yes     Alcohol/week: 4.0 standard drinks of alcohol     Types: 4 Cans of beer per week     Comment: 4 beers weekly    Drug use: Never       Family History     Family History   Problem Relation Name Age of Onset    Accidental death Mother      Coronary artery disease Mother      Other (Cardiac disorder) Father      Dementia Father      Cancer Father      Colon cancer Daughter         Review of Systems  As per HPI, all other systems reviewed and negative.    Allergies:  Allergies   Allergen Reactions    Benadryl Allergy Decongestant Anxiety and Insomnia    Diphenhydramine Anxiety     anxious    Diphenhydramine Hcl Anxiety and Insomnia        Outpatient Medications:  Current Outpatient Medications   Medication Instructions    ascorbic acid (VITAMIN C) 1,000 mg, oral, Daily    carboxymethylcellulose (Refresh Celluvisc) 1 % ophthalmic solution dropperette 1 drop, ophthalmic (eye), Every morning    ergocalciferol (VITAMIN D-2) 1.25 mg, oral, Weekly    ferrous sulfate 65 mg, oral, 2 times daily with meals, Do not crush, chew, or split.    furosemide (Lasix) 20 mg tablet 1 tablet, oral, Daily    melatonin 5 mg, oral, Nightly PRN    metoprolol  tartrate (LOPRESSOR) 25 mg, oral, 2 times daily    nitroglycerin (Nitrostat) 0.4 mg SL tablet sublingual, Every 5 min PRN    omega-3 fatty acids-fish oil 360-1,200 mg capsule 1 capsule, oral, 2 times daily    potassium chloride CR 20 mEq ER tablet 0.5 tablets, oral, Daily    rosuvastatin (Crestor) 10 mg tablet 1 tablet, oral, Daily    traZODone (DESYREL) 100 mg, oral, Nightly, PRN    warfarin (Coumadin) 4 mg tablet TAKE AS DIRECTED Per [facility] Protime Department    warfarin (COUMADIN) 1-2 mg, oral, See admin instructions, Take 1-2 tablets daily or as directed per [facility]<BR>Take as directed per After Visit Summary.    ZINC ORAL 1 tablet, oral, Daily         Vitals:  Vitals:    02/16/24 1334   BP: 134/82   Pulse: 88       Physical Exam:  Physical Exam  Constitutional:       Appearance: Normal appearance.      Comments:  walker for assistance    HENT:      Head: Normocephalic and atraumatic.   Eyes:      Extraocular Movements: Extraocular movements intact.      Pupils: Pupils are equal, round, and reactive to light.   Cardiovascular:      Rate and Rhythm: Normal rate and regular rhythm.      Pulses: Normal pulses.      Heart sounds: Normal heart sounds.   Pulmonary:      Effort: Pulmonary effort is normal.      Breath sounds: Normal breath sounds.   Abdominal:      General: Abdomen is flat.      Palpations: Abdomen is soft.   Musculoskeletal:      Right lower leg: No edema.      Left lower leg: No edema.   Skin:     General: Skin is warm and dry.   Neurological:      General: No focal deficit present.      Mental Status: He is alert and oriented to person, place, and time.             Assessment/Plan   Diagnoses and all orders for this visit:  Paroxysmal atrial fibrillation (CMS/HCC)  Atherosclerosis of native coronary artery of native heart without angina pectoris  Mixed hyperlipidemia  Benign essential hypertension  Atherosclerosis of native artery of both lower extremities with intermittent  claudication (CMS/HCC)  Intermittent claudication (CMS/HCC)  Venous insufficiency  Class 2 obesity with body mass index (BMI) of 38.0 to 38.9 in adult, unspecified obesity type, unspecified whether serious comorbidity present  Hx-TIA (transient ischemic attack)  Sleep apnea, unspecified type  Former smoker  Long term (current) use of anticoagulants          Tina Henderson MD [facility]  Interventional Cardiology   of [facility]     Thank you for allowing me to participate in the care of this patient. Please do not hesitate to contact me with any further questions or concerns.

## 2024-02-20 LAB
INR IN PPP BY COAGULATION ASSAY EXTERNAL: 2.7
PROTHROMBIN TIME (PT) IN PPP BY COAGULATION ASSAY EXTERNAL: NORMAL SECONDS

## 2024-02-21 ENCOUNTER — ANTICOAGULATION - WARFARIN VISIT (OUTPATIENT)
Dept: CARDIOLOGY | Facility: CLINIC | Age: 89
End: 2024-02-21
Payer: MEDICARE

## 2024-02-27 ENCOUNTER — ANTICOAGULATION - WARFARIN VISIT (OUTPATIENT)
Dept: CARDIOLOGY | Facility: CLINIC | Age: 89
End: 2024-02-27
Payer: MEDICARE

## 2024-02-27 LAB
INR IN PPP BY COAGULATION ASSAY EXTERNAL: 3.3
PROTHROMBIN TIME (PT) IN PPP BY COAGULATION ASSAY EXTERNAL: NORMAL SECONDS

## 2024-02-28 ENCOUNTER — DOCUMENTATION (OUTPATIENT)
Dept: CARDIOLOGY | Facility: CLINIC | Age: 89
End: 2024-02-28
Payer: MEDICARE

## 2024-02-28 DIAGNOSIS — Z79.01 LONG TERM (CURRENT) USE OF ANTICOAGULANTS: ICD-10-CM

## 2024-02-28 DIAGNOSIS — I48.0 PAROXYSMAL ATRIAL FIBRILLATION (MULTI): ICD-10-CM

## 2024-02-28 RX ORDER — WARFARIN 1 MG/1
1-2 TABLET ORAL SEE ADMIN INSTRUCTIONS
Qty: 90 TABLET | Refills: 1 | Status: SHIPPED | OUTPATIENT
Start: 2024-02-28 | End: 2024-02-28 | Stop reason: SDUPTHER

## 2024-02-28 RX ORDER — WARFARIN 2 MG/1
2 TABLET ORAL SEE ADMIN INSTRUCTIONS
COMMUNITY
End: 2024-02-28 | Stop reason: SDUPTHER

## 2024-02-28 RX ORDER — WARFARIN 1 MG/1
TABLET ORAL
Qty: 90 TABLET | Refills: 1 | Status: SHIPPED | OUTPATIENT
Start: 2024-02-28 | End: 2024-06-11 | Stop reason: HOSPADM

## 2024-02-28 RX ORDER — WARFARIN 2 MG/1
2 TABLET ORAL SEE ADMIN INSTRUCTIONS
Qty: 90 TABLET | Refills: 0 | Status: ON HOLD | OUTPATIENT
Start: 2024-02-28 | End: 2024-05-28

## 2024-02-28 NOTE — PROGRESS NOTES
Prescription refill request for warfarin 1 mg tablets.  Prescription forwarded to pharmacy as requested.

## 2024-03-05 ENCOUNTER — ANTICOAGULATION - WARFARIN VISIT (OUTPATIENT)
Dept: CARDIOLOGY | Facility: CLINIC | Age: 89
End: 2024-03-05
Payer: MEDICARE

## 2024-03-12 ENCOUNTER — ANTICOAGULATION - WARFARIN VISIT (OUTPATIENT)
Dept: CARDIOLOGY | Facility: CLINIC | Age: 89
End: 2024-03-12
Payer: MEDICARE

## 2024-03-12 ENCOUNTER — HOSPITAL ENCOUNTER (OUTPATIENT)
Dept: CARDIOLOGY | Facility: HOSPITAL | Age: 89
Discharge: HOME | End: 2024-03-12
Payer: MEDICARE

## 2024-03-12 DIAGNOSIS — Z95.0 PACEMAKER: ICD-10-CM

## 2024-03-12 LAB
INR IN PPP BY COAGULATION ASSAY EXTERNAL: 3.5
PROTHROMBIN TIME (PT) IN PPP BY COAGULATION ASSAY EXTERNAL: NORMAL SECONDS

## 2024-03-12 PROCEDURE — 93296 REM INTERROG EVL PM/IDS: CPT

## 2024-03-12 PROCEDURE — 93294 REM INTERROG EVL PM/LDLS PM: CPT | Performed by: INTERNAL MEDICINE

## 2024-03-19 ENCOUNTER — ANTICOAGULATION - WARFARIN VISIT (OUTPATIENT)
Dept: CARDIOLOGY | Facility: CLINIC | Age: 89
End: 2024-03-19
Payer: MEDICARE

## 2024-03-22 NOTE — INTERVAL H&P NOTE
Continues on pepcid BID    H&P reviewed. The patient was examined and there are no changes to the H&P.

## 2024-03-24 DIAGNOSIS — I48.91 UNSPECIFIED ATRIAL FIBRILLATION (MULTI): ICD-10-CM

## 2024-03-25 RX ORDER — FERROUS SULFATE 325(65) MG
1 TABLET ORAL
Qty: 180 TABLET | Refills: 3 | Status: SHIPPED | OUTPATIENT
Start: 2024-03-25 | End: 2024-06-11 | Stop reason: HOSPADM

## 2024-03-26 ENCOUNTER — ANTICOAGULATION - WARFARIN VISIT (OUTPATIENT)
Dept: CARDIOLOGY | Facility: CLINIC | Age: 89
End: 2024-03-26
Payer: MEDICARE

## 2024-03-26 LAB
INR IN PPP BY COAGULATION ASSAY EXTERNAL: 1.3
PROTHROMBIN TIME (PT) IN PPP BY COAGULATION ASSAY EXTERNAL: NORMAL SECONDS

## 2024-04-02 ENCOUNTER — ANTICOAGULATION - WARFARIN VISIT (OUTPATIENT)
Dept: CARDIOLOGY | Facility: CLINIC | Age: 89
End: 2024-04-02
Payer: MEDICARE

## 2024-04-02 LAB
INR IN PPP BY COAGULATION ASSAY EXTERNAL: 4.2
PROTHROMBIN TIME (PT) IN PPP BY COAGULATION ASSAY EXTERNAL: NORMAL SECONDS

## 2024-04-10 ENCOUNTER — ANTICOAGULATION - WARFARIN VISIT (OUTPATIENT)
Dept: CARDIOLOGY | Facility: CLINIC | Age: 89
End: 2024-04-10
Payer: MEDICARE

## 2024-04-10 ENCOUNTER — HOSPITAL ENCOUNTER (OUTPATIENT)
Dept: CT IMAGING | Age: 89
Discharge: HOME | End: 2024-04-12
Payer: MEDICARE

## 2024-04-10 DIAGNOSIS — C45.9 MESOTHELIOMA, MALIGNANT (HCC): ICD-10-CM

## 2024-04-10 PROCEDURE — A9609 HC RX DIAGNOSTIC RADIOPHARMACEUTICAL: HCPCS | Performed by: INTERNAL MEDICINE

## 2024-04-10 PROCEDURE — 3430000000 HC RX DIAGNOSTIC RADIOPHARMACEUTICAL: Performed by: INTERNAL MEDICINE

## 2024-04-10 PROCEDURE — 78815 PET IMAGE W/CT SKULL-THIGH: CPT

## 2024-04-10 RX ORDER — FLUDEOXYGLUCOSE F 18 200 MCI/ML
15.3 INJECTION, SOLUTION INTRAVENOUS
Status: COMPLETED | OUTPATIENT
Start: 2024-04-10 | End: 2024-04-10

## 2024-04-10 RX ADMIN — FLUDEOXYGLUCOSE F 18 15.3 MILLICURIE: 200 INJECTION, SOLUTION INTRAVENOUS at 08:45

## 2024-04-16 LAB
INR IN PPP BY COAGULATION ASSAY EXTERNAL: 1.4
PROTHROMBIN TIME (PT) IN PPP BY COAGULATION ASSAY EXTERNAL: NORMAL SECONDS

## 2024-04-17 ENCOUNTER — ANTICOAGULATION - WARFARIN VISIT (OUTPATIENT)
Dept: CARDIOLOGY | Facility: CLINIC | Age: 89
End: 2024-04-17
Payer: MEDICARE

## 2024-04-22 PROCEDURE — G0180 MD CERTIFICATION HHA PATIENT: HCPCS | Performed by: INTERNAL MEDICINE

## 2024-04-23 ENCOUNTER — ANTICOAGULATION - WARFARIN VISIT (OUTPATIENT)
Dept: CARDIOLOGY | Facility: CLINIC | Age: 89
End: 2024-04-23
Payer: MEDICARE

## 2024-04-23 LAB
INR IN PPP BY COAGULATION ASSAY EXTERNAL: 1.7
PROTHROMBIN TIME (PT) IN PPP BY COAGULATION ASSAY EXTERNAL: NORMAL SECONDS

## 2024-04-30 ENCOUNTER — ANTICOAGULATION - WARFARIN VISIT (OUTPATIENT)
Dept: CARDIOLOGY | Facility: CLINIC | Age: 89
End: 2024-04-30
Payer: MEDICARE

## 2024-05-07 ENCOUNTER — ANTICOAGULATION - WARFARIN VISIT (OUTPATIENT)
Dept: CARDIOLOGY | Facility: CLINIC | Age: 89
End: 2024-05-07
Payer: MEDICARE

## 2024-05-10 ENCOUNTER — TELEPHONE (OUTPATIENT)
Dept: ADMISSION | Facility: HOSPITAL | Age: 89
End: 2024-05-10
Payer: MEDICARE

## 2024-05-10 NOTE — TELEPHONE ENCOUNTER
Pt's wife has been advised per Dr. Galvan that cancer has spread to lymph nodes and ribs.   The provider wants to start a trial chemotherapy on pt on Tuesday. 5/14.   Wife is very concerned and anxious regarding recommendations.   Pt was treated almost 2 years ago, per wife.

## 2024-05-13 ENCOUNTER — TELEMEDICINE (OUTPATIENT)
Dept: HEMATOLOGY/ONCOLOGY | Facility: CLINIC | Age: 89
End: 2024-05-13
Payer: MEDICARE

## 2024-05-13 DIAGNOSIS — Z85.831 H/O MALIGNANT MESOTHELIOMA: ICD-10-CM

## 2024-05-13 DIAGNOSIS — Z85.831: Primary | ICD-10-CM

## 2024-05-13 PROCEDURE — 1159F MED LIST DOCD IN RCRD: CPT | Performed by: STUDENT IN AN ORGANIZED HEALTH CARE EDUCATION/TRAINING PROGRAM

## 2024-05-13 PROCEDURE — 1157F ADVNC CARE PLAN IN RCRD: CPT | Performed by: STUDENT IN AN ORGANIZED HEALTH CARE EDUCATION/TRAINING PROGRAM

## 2024-05-13 PROCEDURE — 99215 OFFICE O/P EST HI 40 MIN: CPT | Performed by: STUDENT IN AN ORGANIZED HEALTH CARE EDUCATION/TRAINING PROGRAM

## 2024-05-13 NOTE — TELEPHONE ENCOUNTER
I called spouse to offer a virtual appt today or tomorrow; she did not answer so I left a detailed voicemail and asked that she call back to make the appointment.

## 2024-05-13 NOTE — TELEPHONE ENCOUNTER
I talked to Vinicius's wife Kirsten and said Dr. Ann offer a virtual appointment today or tomorrow depending on her schedule. I told Kirsten I would call her back when I have more details. Sent secure chat to Dr. Ann and Felisa.

## 2024-05-13 NOTE — TELEPHONE ENCOUNTER
Vinicius's wife kept cutting out on phone. I told her I will try calling her back now for a better connection.

## 2024-05-13 NOTE — TELEPHONE ENCOUNTER
Received a secure chat back from Felisa on team and she said 11:40 today for a virtual appointment. She put appointment on Dr. Ann's schedule and I confirmed with Kirsten this time works. Kirsten confirmed the time worked well for today.

## 2024-05-13 NOTE — PROGRESS NOTES
[facility] [place] - Medical Oncology Follow-Up Visit    Patient ID: Vinicius Arriola is a 88 y.o. male with mesothelioma.     Current therapy: [No matching plan found]     Chief Concern: next steps    Oncologic History:     DIAGNOSIS  malignant pleural mesothelioma, epithelioid type     STAGING  unknown     CURRENT SITES OF DISEASE  L pleura     MOLECULAR GENOMICS        PRIOR THERAPY  L VATS with pulmonary decortication on 5/22/22  XRT 9/30-10/6/22      CURRENT THERAPY           CURRENT ONCOLOGICAL PROBLEMS           HISTORY OF PRESENT ILLNESS  Mr. Arriola is an 88 yo with multiple medical comorbidities who first met Dr. Luke Hahn in late April 2022 when he was hospitalized for a traumatic fall and noted to have a L pleural effusion. This resolved with a pigtail catheter, but unfortunately  recurred. He ultimately had L VATS with pulmonary decortication on 5/22/22, course c/b post-op ileus. Dr. Hahn did not note any lesions or studding intraoperatively, but unfortunately random L pleural rind biopsy revealed malignant mesothelioma, epithelioid  type. Due to poor performance status, he received definitive radiation only from 9/30-10/6/22. He was then monitored with surveillance. He had recurrence of pleural effusion which was negative on cytology and treated for pneumonia. Scans without evidence  of recurrence. Lengthy discussion with patient and wife regarding potential ipi/nivo, and he does not want systemic cancer-directed therapy.        PAST MEDICAL HISTORY  PAD  afib/flutter  hx DVT on warfarin  HTN  CAD s/p stent  mitral regurg  HLD  polycythemia vera, managed by Dr. Rothman ([facility])  JOVANNI  basal cell ca on face, s/p removal      SOCIAL HISTORY  Lives at home with wife. Retired 2006.  Tob: quit 40-50 years ago, 1 ppd x 10 years.  EtOH: 1 glass of wine daily, beer couple times per week  Illicits: none     CURRENT MEDS  see below     ALLERGIES  see below     FAMILY HISTORY  daughter -  colon ca dx 50 yo  paternal uncle - colon ca dx 70s  maternal grandmother - unknown ca dx 70s    HPI   Telephone visit today per patient preference. He had a PET/CT scan done on 4/26/24, which showed the cancer had gone into his lymph nodes. He had been seeing Dr. Rothman for his polycythemia, so they met Dr. Galvan (that's who ordered the PET/CT). They are recommending a clinical trial with chemotherapy. Said he would do half the dose of chemo. He has no appetite, sleeps a lot in the recliner during the day - doesn't eat. He's lost about 25 lbs in the last 2 years, not a big difference, just a little difference. He is in his recliner 75% of the day, he is only walking with his walker and can't go too far. He doesn't go anywhere, just to the doctor. His breathing is ok, but the coughing has gotten a lot worse.     Meds (Current):    Current Outpatient Medications:     ascorbic acid (Vitamin C) 1,000 mg tablet, Take 1 tablet (1,000 mg) by mouth once daily., Disp: , Rfl:     carboxymethylcellulose (Refresh Celluvisc) 1 % ophthalmic solution dropperette, Administer 1 drop into affected eye(s) once daily in the morning., Disp: , Rfl:     ergocalciferol (Vitamin D-2) 1.25 MG (66028 UT) capsule, Take 1 capsule (1,250 mcg) by mouth 1 (one) time per week., Disp: , Rfl:     ferrous sulfate 325 (65 Fe) MG EC tablet, Take 65 mg by mouth 2 times a day with meals. Do not crush, chew, or split., Disp: , Rfl:     ferrous sulfate, 325 mg ferrous sulfate, tablet, TAKE 1 TABLET BY MOUTH TWICE A DAY WITH MEALS, Disp: 180 tablet, Rfl: 3    furosemide (Lasix) 20 mg tablet, Take 1 tablet (20 mg) by mouth once daily., Disp: , Rfl:     melatonin 5 mg tablet, Take 1 tablet (5 mg) by mouth as needed at bedtime., Disp: , Rfl:     metoprolol tartrate (Lopressor) 25 mg tablet, Take 1 tablet (25 mg) by mouth 2 times a day., Disp: , Rfl:     nitroglycerin (Nitrostat) 0.4 mg SL tablet, Place under the tongue every 5 minutes if needed for chest  pain (up to 3 doses)., Disp: , Rfl:     omega-3 fatty acids-fish oil 360-1,200 mg capsule, Take 1 capsule (1,200 mg) by mouth twice a day., Disp: , Rfl:     potassium chloride CR (Klor-Con) 10 mEq ER tablet, Take 1 tablet (10 mEq) by mouth once daily., Disp: 90 tablet, Rfl: 3    rosuvastatin (Crestor) 10 mg tablet, Take 1 tablet (10 mg) by mouth once daily., Disp: , Rfl:     traZODone (Desyrel) 50 mg tablet, Take 2 tablets (100 mg) by mouth once daily at bedtime. PRN, Disp: , Rfl:     warfarin (Coumadin) 1 mg tablet, Take 1-2 tablets daily or as directed per [facility], Disp: 90 tablet, Rfl: 1    warfarin (Coumadin) 2 mg tablet, Take 1 tablet (2 mg) by mouth see administration instructions. Take 1 tablet daily or as directed per [facility], Disp: 90 tablet, Rfl: 0    warfarin (Coumadin) 4 mg tablet, TAKE AS DIRECTED Per [facility] Protime Department, Disp: 90 tablet, Rfl: 0    ZINC ORAL, Take 1 tablet by mouth once daily., Disp: , Rfl:     Review of Systems   All other systems reviewed and are negative.       Objective   BSA: There is no height or weight on file to calculate BSA.  Wt Readings from Last 5 Encounters:   02/16/24 114 kg (250 lb 6.4 oz)   12/11/23 115 kg (254 lb 3.1 oz)   12/06/23 116 kg (255 lb)   11/02/23 116 kg (255 lb 4.7 oz)   08/18/23 115 kg (254 lb 6 oz)     There were no vitals taken for this visit.    ECOG Score: 3- Capable of only limited selfcare, confined to bed/chair > 50% of waking hrs.      Physical Exam     Results:  Labs:  Lab Results   Component Value Date    WBC 10.6 12/18/2023    HGB 14.0 12/18/2023    HCT 43.4 12/18/2023    MCV 87 12/18/2023     12/18/2023      Lab Results   Component Value Date    NEUTROABS 12.92 (H) 12/13/2023      Lab Results   Component Value Date    GLUCOSE 90 12/18/2023    CALCIUM 8.4 (L) 12/18/2023     12/18/2023    K 4.3 12/18/2023    CO2 31 12/18/2023     12/18/2023    BUN 32 (H) 12/18/2023    CREATININE 1.19 12/18/2023     MG 1.94 12/10/2023     Lab Results   Component Value Date    ALT 11 12/10/2023    AST 15 12/10/2023    ALKPHOS 63 12/10/2023    BILITOT 0.4 12/10/2023      Lab Results   Component Value Date    TSH 3.74 10/12/2022       Imaging:  I have personally reviewed the below imaging and concur with the reported findings unless otherwise stated:    OSH PET/CT 4/10/24   FINDINGS:   HEAD/NECK: There is no evidence of hypermetabolic foci within intracranial   space or scalp.  There is no evidence of hypermetabolic cervical lymph node.   Laryngeal metabolic activity is likely physiologic..     CHEST: There is diffusely increased metabolic activity in the left pleural,   but particularly in the nodular left posterolateral and inferior pleura, with   a maximal SUV of 19.3 and within the posteromedial inferior nodular pleural   with a maximal SUV of 21.6.  Left inferolateral pleural nodularity has a   maximal SUV of 14.5.  Nodularity along the medial pleura anterior to the   descending thoracic aorta is also hypermetabolic, with maximal SUV of 12.7.   Mild left apical pleural nodularity has a maximal SUV of 10.9.     There is no evidence of definite pulmonary parenchymal hypermetabolic foci.   Left hilar nonenlarged lymph node has a maximal SUV of 11.2 on axial slice   88.  Soft tissue fullness posterior to the left hilum adjacent to the above   described medial pleural nodularity may represent additional hypermetabolic   lymph nodes.  There is no evidence of hypermetabolic right pleural foci.   Right lung is free of focal parenchymal hypermetabolic activity     ABDOMEN/PELVIS: The liver, spleen, pancreas and adrenals are free of focal   hypermetabolism.  There is curvilinear increased metabolic activity with a   maximal SUV of 7.4 in the left inferior chest wall at the posterior   interspace between the left 10th and 11th ribs.  This is inferior to the   expected location of the costophrenic angle.  There is no evidence of    hypermetabolic wall foci.  No hypermetabolic abdominal or pelvic lymph nodes   are identified.     BONES/SOFT TISSUE: There is no evidence of osseous hypermetabolic foci.     INCIDENTAL CT FINDINGS: There is sigmoid colonic diverticulosis, without   evidence of diverticulitis.  Lung windows reveal extensive left lung airspace   disease and volume loss involving the upper lobes, lingula and lower lobe.     IMPRESSION:   Multinodular changes in the left pleura, particularly posteriorly and   inferiorly are hypermetabolic, consistent with mesothelioma.     There are hypermetabolic left hilar lymph nodes worrisome for metastasis.     Curvilinear focus of hypermetabolic activity in the left inferior chest wall   between the posterior 10th and 11th ribs is likely metastatic.     === Results for orders placed in visit on 10/12/22 ===    MR brain wo IV contrast [BFG720] 10/14/2022    Status: Normal  No acute intracranial pathology.      Assessment/Plan      Vinicius Arriola is a 88 y.o. male here for follow up of mesothelioma.    # Malignant Pleural mesothelioma  - found incidentally on work-up for recurrent pleural effusion  - epithelioid type, PET/CT without evidence of distant disease  - not a surgical candidate, discussed with Dr. Hahn  - discussed that he is a poor candidate for systemic therapy given his multiple comorbidities and poor performance status. On re-assessment 7/11/22, I discussed with the patient and family that he continues to be a poor candidate for systemic therapy  given his performance status  - Pt has appt to see Dr. Jaquez in radiation oncology in a few days  - He received definitive radiation with Dr. Jaquez which was complete 10/6/22 and he was being followed on surveillance  - he had recent lengthy hospitalization with large pleural effusion, cytology negative and treated for infection  - previously discussed that his performance status still precludes chemotherapy. unclear if mesothelioma is  progressing, but although cytology negative, still a possibility that he has progression. his previous pleural effusions were also without positive  cytology. no evidence of disease on scan, but could consider dual checkpoint inhibitor therapy with ipi/nivo as there was no evidence of his disease on scans or cytology on initial presentation, and high suspicion that he has recurrence. had lengthy discussion  with him and his wife, and given his current performance status and higher risk of complications with dual ICI therapy, he does not want systemic cancer-directed therapy and does not think he ever will.  - reviewed most recent scan personally with possible increase in size of consolidation. given recent steroids, will treat empirically for infection with levofloxacin for 5 day course. acknowledged that this could be resolving pneumonia as unclear time  course of symptoms and scans. he has f/u already scheduled with pulmonology regarding steroids.   - discussed utility of surveillance scans moving forward - as he will still not be able to receive future radiation and risks of dual ICI or chemo are too high gien performance status and comorbidities, whether or not mesothelioma is seen on scans in  the future would not change whether he receives cancer-directed therapy in the future  - he and his wife decided to forego future surveillance scans previously  - returns now with OSH PET/CT evidence of progression of disease, with plans for chemotherapy  - he is understandably hesitant due to concern for side effects and complications  - discussed that alternative to chemo would be dual checkpoint inhibitors or hospice, and that he can always change his mind if he starts on treatment and is not tolerating  - they have decided to try the chemotherapy and see how it goes  - RTC PRN     # Cough  - follows with pulm, trialed many agents previously, will trial levofloxacin as above  - will RTC 3 months for symptom  evaluation     # Constipation  - continue senekot, trial miralax     # Advanced care planning  Discussed that if mesothelioma has progressed, option would be for systemic therapy only, either dual chemotherapy or dual checkpoint inhibitors. given his current performance status, he is not a good candidate for either. We will continue this discussion.       Mariely Ann MD  [facility]

## 2024-05-15 ENCOUNTER — ANTICOAGULATION - WARFARIN VISIT (OUTPATIENT)
Dept: CARDIOLOGY | Facility: CLINIC | Age: 89
End: 2024-05-15
Payer: MEDICARE

## 2024-05-16 ENCOUNTER — APPOINTMENT (OUTPATIENT)
Dept: RADIOLOGY | Facility: HOSPITAL | Age: 89
DRG: 871 | End: 2024-05-16
Payer: MEDICARE

## 2024-05-16 ENCOUNTER — APPOINTMENT (OUTPATIENT)
Dept: CARDIOLOGY | Facility: HOSPITAL | Age: 89
DRG: 871 | End: 2024-05-16
Payer: MEDICARE

## 2024-05-16 ENCOUNTER — HOSPITAL ENCOUNTER (INPATIENT)
Facility: HOSPITAL | Age: 89
LOS: 2 days | Discharge: ANOTHER HOSPITAL | DRG: 871 | End: 2024-05-19
Attending: STUDENT IN AN ORGANIZED HEALTH CARE EDUCATION/TRAINING PROGRAM | Admitting: STUDENT IN AN ORGANIZED HEALTH CARE EDUCATION/TRAINING PROGRAM
Payer: MEDICARE

## 2024-05-16 DIAGNOSIS — R33.8 ACUTE URINARY RETENTION: ICD-10-CM

## 2024-05-16 DIAGNOSIS — A41.9 SEPSIS, DUE TO UNSPECIFIED ORGANISM, UNSPECIFIED WHETHER ACUTE ORGAN DYSFUNCTION PRESENT (MULTI): Primary | ICD-10-CM

## 2024-05-16 LAB
ALBUMIN SERPL BCP-MCNC: 3.8 G/DL (ref 3.4–5)
ALP SERPL-CCNC: 61 U/L (ref 33–136)
ALT SERPL W P-5'-P-CCNC: 9 U/L (ref 10–52)
ANION GAP SERPL CALC-SCNC: 15 MMOL/L (ref 10–20)
APPEARANCE UR: CLEAR
AST SERPL W P-5'-P-CCNC: 17 U/L (ref 9–39)
BASOPHILS # BLD AUTO: 0.03 X10*3/UL (ref 0–0.1)
BASOPHILS NFR BLD AUTO: 0.3 %
BILIRUB SERPL-MCNC: 0.5 MG/DL (ref 0–1.2)
BILIRUB UR STRIP.AUTO-MCNC: NEGATIVE MG/DL
BUN SERPL-MCNC: 29 MG/DL (ref 6–23)
CALCIUM SERPL-MCNC: 9.4 MG/DL (ref 8.6–10.3)
CARDIAC TROPONIN I PNL SERPL HS: 23 NG/L (ref 0–20)
CARDIAC TROPONIN I PNL SERPL HS: 27 NG/L (ref 0–20)
CHLORIDE SERPL-SCNC: 97 MMOL/L (ref 98–107)
CO2 SERPL-SCNC: 25 MMOL/L (ref 21–32)
COLOR UR: YELLOW
CREAT SERPL-MCNC: 1.34 MG/DL (ref 0.5–1.3)
EGFRCR SERPLBLD CKD-EPI 2021: 52 ML/MIN/1.73M*2
EOSINOPHIL # BLD AUTO: 0.01 X10*3/UL (ref 0–0.4)
EOSINOPHIL NFR BLD AUTO: 0.1 %
ERYTHROCYTE [DISTWIDTH] IN BLOOD BY AUTOMATED COUNT: 16.6 % (ref 11.5–14.5)
GLUCOSE SERPL-MCNC: 97 MG/DL (ref 74–99)
GLUCOSE UR STRIP.AUTO-MCNC: NEGATIVE MG/DL
HCT VFR BLD AUTO: 40.9 % (ref 41–52)
HGB BLD-MCNC: 13.4 G/DL (ref 13.5–17.5)
IMM GRANULOCYTES # BLD AUTO: 0.03 X10*3/UL (ref 0–0.5)
IMM GRANULOCYTES NFR BLD AUTO: 0.3 % (ref 0–0.9)
INR PPP: 1.8 (ref 0.9–1.1)
KETONES UR STRIP.AUTO-MCNC: NEGATIVE MG/DL
LACTATE SERPL-SCNC: 1.5 MMOL/L (ref 0.4–2)
LEUKOCYTE ESTERASE UR QL STRIP.AUTO: NEGATIVE
LYMPHOCYTES # BLD AUTO: 0.57 X10*3/UL (ref 0.8–3)
LYMPHOCYTES NFR BLD AUTO: 5.1 %
MAGNESIUM SERPL-MCNC: 1.87 MG/DL (ref 1.6–2.4)
MCH RBC QN AUTO: 29 PG (ref 26–34)
MCHC RBC AUTO-ENTMCNC: 32.8 G/DL (ref 32–36)
MCV RBC AUTO: 89 FL (ref 80–100)
MONOCYTES # BLD AUTO: 0.32 X10*3/UL (ref 0.05–0.8)
MONOCYTES NFR BLD AUTO: 2.9 %
NEUTROPHILS # BLD AUTO: 10.2 X10*3/UL (ref 1.6–5.5)
NEUTROPHILS NFR BLD AUTO: 91.3 %
NITRITE UR QL STRIP.AUTO: NEGATIVE
NRBC BLD-RTO: 0 /100 WBCS (ref 0–0)
PH UR STRIP.AUTO: 5 [PH]
PHOSPHATE SERPL-MCNC: 2.5 MG/DL (ref 2.5–4.9)
PLATELET # BLD AUTO: 197 X10*3/UL (ref 150–450)
POTASSIUM SERPL-SCNC: 4.5 MMOL/L (ref 3.5–5.3)
PROT SERPL-MCNC: 7 G/DL (ref 6.4–8.2)
PROT UR STRIP.AUTO-MCNC: NEGATIVE MG/DL
PROTHROMBIN TIME: 19.9 SECONDS (ref 9.8–12.8)
RBC # BLD AUTO: 4.62 X10*6/UL (ref 4.5–5.9)
RBC # UR STRIP.AUTO: NEGATIVE /UL
SODIUM SERPL-SCNC: 132 MMOL/L (ref 136–145)
SP GR UR STRIP.AUTO: 1.01
UROBILINOGEN UR STRIP.AUTO-MCNC: <2 MG/DL
WBC # BLD AUTO: 11.2 X10*3/UL (ref 4.4–11.3)

## 2024-05-16 PROCEDURE — 96365 THER/PROPH/DIAG IV INF INIT: CPT

## 2024-05-16 PROCEDURE — 71045 X-RAY EXAM CHEST 1 VIEW: CPT

## 2024-05-16 PROCEDURE — 36415 COLL VENOUS BLD VENIPUNCTURE: CPT | Performed by: STUDENT IN AN ORGANIZED HEALTH CARE EDUCATION/TRAINING PROGRAM

## 2024-05-16 PROCEDURE — 51798 US URINE CAPACITY MEASURE: CPT

## 2024-05-16 PROCEDURE — 83605 ASSAY OF LACTIC ACID: CPT | Performed by: STUDENT IN AN ORGANIZED HEALTH CARE EDUCATION/TRAINING PROGRAM

## 2024-05-16 PROCEDURE — 71045 X-RAY EXAM CHEST 1 VIEW: CPT | Mod: FOREIGN READ | Performed by: RADIOLOGY

## 2024-05-16 PROCEDURE — 84484 ASSAY OF TROPONIN QUANT: CPT | Performed by: STUDENT IN AN ORGANIZED HEALTH CARE EDUCATION/TRAINING PROGRAM

## 2024-05-16 PROCEDURE — 85610 PROTHROMBIN TIME: CPT | Performed by: STUDENT IN AN ORGANIZED HEALTH CARE EDUCATION/TRAINING PROGRAM

## 2024-05-16 PROCEDURE — 99291 CRITICAL CARE FIRST HOUR: CPT

## 2024-05-16 PROCEDURE — 84100 ASSAY OF PHOSPHORUS: CPT | Performed by: STUDENT IN AN ORGANIZED HEALTH CARE EDUCATION/TRAINING PROGRAM

## 2024-05-16 PROCEDURE — 96366 THER/PROPH/DIAG IV INF ADDON: CPT

## 2024-05-16 PROCEDURE — 2500000004 HC RX 250 GENERAL PHARMACY W/ HCPCS (ALT 636 FOR OP/ED): Performed by: STUDENT IN AN ORGANIZED HEALTH CARE EDUCATION/TRAINING PROGRAM

## 2024-05-16 PROCEDURE — 99285 EMERGENCY DEPT VISIT HI MDM: CPT | Mod: 25

## 2024-05-16 PROCEDURE — 2550000001 HC RX 255 CONTRASTS: Performed by: STUDENT IN AN ORGANIZED HEALTH CARE EDUCATION/TRAINING PROGRAM

## 2024-05-16 PROCEDURE — 74177 CT ABD & PELVIS W/CONTRAST: CPT | Mod: FOREIGN READ | Performed by: RADIOLOGY

## 2024-05-16 PROCEDURE — 96375 TX/PRO/DX INJ NEW DRUG ADDON: CPT

## 2024-05-16 PROCEDURE — 85025 COMPLETE CBC W/AUTO DIFF WBC: CPT | Performed by: STUDENT IN AN ORGANIZED HEALTH CARE EDUCATION/TRAINING PROGRAM

## 2024-05-16 PROCEDURE — 93005 ELECTROCARDIOGRAM TRACING: CPT

## 2024-05-16 PROCEDURE — 87040 BLOOD CULTURE FOR BACTERIA: CPT | Mod: ELYLAB | Performed by: STUDENT IN AN ORGANIZED HEALTH CARE EDUCATION/TRAINING PROGRAM

## 2024-05-16 PROCEDURE — 83735 ASSAY OF MAGNESIUM: CPT | Performed by: STUDENT IN AN ORGANIZED HEALTH CARE EDUCATION/TRAINING PROGRAM

## 2024-05-16 PROCEDURE — 84075 ASSAY ALKALINE PHOSPHATASE: CPT | Performed by: STUDENT IN AN ORGANIZED HEALTH CARE EDUCATION/TRAINING PROGRAM

## 2024-05-16 PROCEDURE — 74177 CT ABD & PELVIS W/CONTRAST: CPT

## 2024-05-16 PROCEDURE — 81003 URINALYSIS AUTO W/O SCOPE: CPT | Performed by: STUDENT IN AN ORGANIZED HEALTH CARE EDUCATION/TRAINING PROGRAM

## 2024-05-16 RX ORDER — ACETAMINOPHEN 325 MG/1
975 TABLET ORAL ONCE
Status: COMPLETED | OUTPATIENT
Start: 2024-05-16 | End: 2024-05-16

## 2024-05-16 RX ORDER — ONDANSETRON HYDROCHLORIDE 2 MG/ML
4 INJECTION, SOLUTION INTRAVENOUS ONCE
Status: COMPLETED | OUTPATIENT
Start: 2024-05-16 | End: 2024-05-16

## 2024-05-16 RX ADMIN — ACETAMINOPHEN 975 MG: 325 TABLET ORAL at 23:08

## 2024-05-16 RX ADMIN — PIPERACILLIN AND TAZOBACTAM 4.5 G: 4; .5 INJECTION, POWDER, LYOPHILIZED, FOR SOLUTION INTRAVENOUS at 23:11

## 2024-05-16 RX ADMIN — VANCOMYCIN HYDROCHLORIDE 2 G: 5 INJECTION, POWDER, LYOPHILIZED, FOR SOLUTION INTRAVENOUS at 22:55

## 2024-05-16 RX ADMIN — ONDANSETRON 4 MG: 2 INJECTION INTRAMUSCULAR; INTRAVENOUS at 23:11

## 2024-05-16 RX ADMIN — IOHEXOL 75 ML: 350 INJECTION, SOLUTION INTRAVENOUS at 22:17

## 2024-05-16 ASSESSMENT — PAIN SCALES - GENERAL
PAINLEVEL_OUTOF10: 0 - NO PAIN

## 2024-05-16 ASSESSMENT — LIFESTYLE VARIABLES
TOTAL SCORE: 0
HAVE PEOPLE ANNOYED YOU BY CRITICIZING YOUR DRINKING: NO
EVER HAD A DRINK FIRST THING IN THE MORNING TO STEADY YOUR NERVES TO GET RID OF A HANGOVER: NO
HAVE YOU EVER FELT YOU SHOULD CUT DOWN ON YOUR DRINKING: NO
EVER FELT BAD OR GUILTY ABOUT YOUR DRINKING: NO

## 2024-05-16 ASSESSMENT — PAIN - FUNCTIONAL ASSESSMENT: PAIN_FUNCTIONAL_ASSESSMENT: 0-10

## 2024-05-17 ENCOUNTER — APPOINTMENT (OUTPATIENT)
Dept: RADIOLOGY | Facility: HOSPITAL | Age: 89
DRG: 871 | End: 2024-05-17
Payer: MEDICARE

## 2024-05-17 PROBLEM — A41.9 SEPSIS (MULTI): Status: ACTIVE | Noted: 2024-05-17

## 2024-05-17 PROBLEM — K57.92 ACUTE DIVERTICULITIS: Status: ACTIVE | Noted: 2024-05-17

## 2024-05-17 PROBLEM — A41.9 SEPSIS, DUE TO UNSPECIFIED ORGANISM, UNSPECIFIED WHETHER ACUTE ORGAN DYSFUNCTION PRESENT (MULTI): Status: ACTIVE | Noted: 2024-05-17

## 2024-05-17 LAB
ALBUMIN SERPL BCP-MCNC: 3.6 G/DL (ref 3.4–5)
ALP SERPL-CCNC: 60 U/L (ref 33–136)
ALT SERPL W P-5'-P-CCNC: 12 U/L (ref 10–52)
AMMONIA PLAS-SCNC: 13 UMOL/L (ref 16–53)
ANION GAP SERPL CALC-SCNC: 14 MMOL/L (ref 10–20)
ANION GAP SERPL CALC-SCNC: 19 MMOL/L (ref 10–20)
AST SERPL W P-5'-P-CCNC: 17 U/L (ref 9–39)
BILIRUB SERPL-MCNC: 0.7 MG/DL (ref 0–1.2)
BUN SERPL-MCNC: 25 MG/DL (ref 6–23)
BUN SERPL-MCNC: 27 MG/DL (ref 6–23)
CALCIUM SERPL-MCNC: 9.1 MG/DL (ref 8.6–10.3)
CALCIUM SERPL-MCNC: 9.1 MG/DL (ref 8.6–10.3)
CHLORIDE SERPL-SCNC: 95 MMOL/L (ref 98–107)
CHLORIDE SERPL-SCNC: 96 MMOL/L (ref 98–107)
CK SERPL-CCNC: 91 U/L (ref 0–325)
CO2 SERPL-SCNC: 24 MMOL/L (ref 21–32)
CO2 SERPL-SCNC: 28 MMOL/L (ref 21–32)
CREAT SERPL-MCNC: 1.42 MG/DL (ref 0.5–1.3)
CREAT SERPL-MCNC: 1.46 MG/DL (ref 0.5–1.3)
CRP SERPL-MCNC: 30.46 MG/DL
EGFRCR SERPLBLD CKD-EPI 2021: 47 ML/MIN/1.73M*2
EGFRCR SERPLBLD CKD-EPI 2021: 48 ML/MIN/1.73M*2
ERYTHROCYTE [DISTWIDTH] IN BLOOD BY AUTOMATED COUNT: 16.8 % (ref 11.5–14.5)
ERYTHROCYTE [SEDIMENTATION RATE] IN BLOOD BY WESTERGREN METHOD: 49 MM/H (ref 0–20)
GLUCOSE BLD MANUAL STRIP-MCNC: 111 MG/DL (ref 74–99)
GLUCOSE SERPL-MCNC: 100 MG/DL (ref 74–99)
GLUCOSE SERPL-MCNC: 108 MG/DL (ref 74–99)
HCT VFR BLD AUTO: 41.1 % (ref 41–52)
HGB BLD-MCNC: 13.2 G/DL (ref 13.5–17.5)
HOLD SPECIMEN: NORMAL
INR PPP: 2 (ref 0.9–1.1)
LACTATE SERPL-SCNC: 2 MMOL/L (ref 0.4–2)
LIPASE SERPL-CCNC: 7 U/L (ref 9–82)
MCH RBC QN AUTO: 28.8 PG (ref 26–34)
MCHC RBC AUTO-ENTMCNC: 32.1 G/DL (ref 32–36)
MCV RBC AUTO: 90 FL (ref 80–100)
NRBC BLD-RTO: 0 /100 WBCS (ref 0–0)
PLATELET # BLD AUTO: 192 X10*3/UL (ref 150–450)
POTASSIUM SERPL-SCNC: 4.1 MMOL/L (ref 3.5–5.3)
POTASSIUM SERPL-SCNC: 4.2 MMOL/L (ref 3.5–5.3)
PROT SERPL-MCNC: 6.8 G/DL (ref 6.4–8.2)
PROTHROMBIN TIME: 22.5 SECONDS (ref 9.8–12.8)
PSA SERPL-MCNC: 8.61 NG/ML
RBC # BLD AUTO: 4.59 X10*6/UL (ref 4.5–5.9)
SARS-COV-2 RNA RESP QL NAA+PROBE: NOT DETECTED
SODIUM SERPL-SCNC: 134 MMOL/L (ref 136–145)
SODIUM SERPL-SCNC: 134 MMOL/L (ref 136–145)
T4 FREE SERPL-MCNC: 1.18 NG/DL (ref 0.61–1.12)
TSH SERPL-ACNC: 4.62 MIU/L (ref 0.44–3.98)
VANCOMYCIN SERPL-MCNC: 16.1 UG/ML (ref 5–20)
WBC # BLD AUTO: 14.4 X10*3/UL (ref 4.4–11.3)

## 2024-05-17 PROCEDURE — 2500000004 HC RX 250 GENERAL PHARMACY W/ HCPCS (ALT 636 FOR OP/ED): Performed by: INTERNAL MEDICINE

## 2024-05-17 PROCEDURE — 2500000005 HC RX 250 GENERAL PHARMACY W/O HCPCS: Performed by: NURSE PRACTITIONER

## 2024-05-17 PROCEDURE — 2500000004 HC RX 250 GENERAL PHARMACY W/ HCPCS (ALT 636 FOR OP/ED): Performed by: STUDENT IN AN ORGANIZED HEALTH CARE EDUCATION/TRAINING PROGRAM

## 2024-05-17 PROCEDURE — 84145 PROCALCITONIN (PCT): CPT | Mod: ELYLAB | Performed by: INTERNAL MEDICINE

## 2024-05-17 PROCEDURE — 85027 COMPLETE CBC AUTOMATED: CPT

## 2024-05-17 PROCEDURE — 36415 COLL VENOUS BLD VENIPUNCTURE: CPT

## 2024-05-17 PROCEDURE — 83605 ASSAY OF LACTIC ACID: CPT | Performed by: INTERNAL MEDICINE

## 2024-05-17 PROCEDURE — 82947 ASSAY GLUCOSE BLOOD QUANT: CPT

## 2024-05-17 PROCEDURE — 2500000004 HC RX 250 GENERAL PHARMACY W/ HCPCS (ALT 636 FOR OP/ED): Performed by: PHARMACIST

## 2024-05-17 PROCEDURE — 80048 BASIC METABOLIC PNL TOTAL CA: CPT | Mod: CCI | Performed by: STUDENT IN AN ORGANIZED HEALTH CARE EDUCATION/TRAINING PROGRAM

## 2024-05-17 PROCEDURE — 83690 ASSAY OF LIPASE: CPT | Performed by: INTERNAL MEDICINE

## 2024-05-17 PROCEDURE — 2500000001 HC RX 250 WO HCPCS SELF ADMINISTERED DRUGS (ALT 637 FOR MEDICARE OP): Performed by: PHARMACIST

## 2024-05-17 PROCEDURE — 2500000006 HC RX 250 W HCPCS SELF ADMINISTERED DRUGS (ALT 637 FOR ALL PAYERS): Mod: MUE | Performed by: NURSE PRACTITIONER

## 2024-05-17 PROCEDURE — 87635 SARS-COV-2 COVID-19 AMP PRB: CPT | Performed by: STUDENT IN AN ORGANIZED HEALTH CARE EDUCATION/TRAINING PROGRAM

## 2024-05-17 PROCEDURE — 84443 ASSAY THYROID STIM HORMONE: CPT | Performed by: STUDENT IN AN ORGANIZED HEALTH CARE EDUCATION/TRAINING PROGRAM

## 2024-05-17 PROCEDURE — 76770 US EXAM ABDO BACK WALL COMP: CPT

## 2024-05-17 PROCEDURE — 2500000004 HC RX 250 GENERAL PHARMACY W/ HCPCS (ALT 636 FOR OP/ED): Performed by: NURSE PRACTITIONER

## 2024-05-17 PROCEDURE — 84439 ASSAY OF FREE THYROXINE: CPT | Performed by: STUDENT IN AN ORGANIZED HEALTH CARE EDUCATION/TRAINING PROGRAM

## 2024-05-17 PROCEDURE — 80048 BASIC METABOLIC PNL TOTAL CA: CPT

## 2024-05-17 PROCEDURE — 1200000002 HC GENERAL ROOM WITH TELEMETRY DAILY

## 2024-05-17 PROCEDURE — 87081 CULTURE SCREEN ONLY: CPT | Mod: ELYLAB | Performed by: NURSE PRACTITIONER

## 2024-05-17 PROCEDURE — 87632 RESP VIRUS 6-11 TARGETS: CPT | Performed by: STUDENT IN AN ORGANIZED HEALTH CARE EDUCATION/TRAINING PROGRAM

## 2024-05-17 PROCEDURE — 76770 US EXAM ABDO BACK WALL COMP: CPT | Performed by: STUDENT IN AN ORGANIZED HEALTH CARE EDUCATION/TRAINING PROGRAM

## 2024-05-17 PROCEDURE — 85652 RBC SED RATE AUTOMATED: CPT | Performed by: INTERNAL MEDICINE

## 2024-05-17 PROCEDURE — 80202 ASSAY OF VANCOMYCIN: CPT

## 2024-05-17 PROCEDURE — 97161 PT EVAL LOW COMPLEX 20 MIN: CPT | Mod: GP | Performed by: PHYSICAL THERAPIST

## 2024-05-17 PROCEDURE — 85610 PROTHROMBIN TIME: CPT

## 2024-05-17 PROCEDURE — 82550 ASSAY OF CK (CPK): CPT | Performed by: INTERNAL MEDICINE

## 2024-05-17 PROCEDURE — 86140 C-REACTIVE PROTEIN: CPT | Performed by: INTERNAL MEDICINE

## 2024-05-17 PROCEDURE — 2500000001 HC RX 250 WO HCPCS SELF ADMINISTERED DRUGS (ALT 637 FOR MEDICARE OP)

## 2024-05-17 PROCEDURE — 87040 BLOOD CULTURE FOR BACTERIA: CPT | Mod: ELYLAB | Performed by: STUDENT IN AN ORGANIZED HEALTH CARE EDUCATION/TRAINING PROGRAM

## 2024-05-17 PROCEDURE — 82140 ASSAY OF AMMONIA: CPT | Performed by: STUDENT IN AN ORGANIZED HEALTH CARE EDUCATION/TRAINING PROGRAM

## 2024-05-17 PROCEDURE — 2500000001 HC RX 250 WO HCPCS SELF ADMINISTERED DRUGS (ALT 637 FOR MEDICARE OP): Performed by: NURSE PRACTITIONER

## 2024-05-17 PROCEDURE — 84153 ASSAY OF PSA TOTAL: CPT | Performed by: UROLOGY

## 2024-05-17 RX ORDER — ASCORBIC ACID 250 MG
1000 TABLET ORAL DAILY
Status: DISCONTINUED | OUTPATIENT
Start: 2024-05-17 | End: 2024-05-19 | Stop reason: HOSPADM

## 2024-05-17 RX ORDER — ACETAMINOPHEN 650 MG/1
650 SUPPOSITORY RECTAL EVERY 4 HOURS PRN
Status: DISCONTINUED | OUTPATIENT
Start: 2024-05-17 | End: 2024-05-19 | Stop reason: HOSPADM

## 2024-05-17 RX ORDER — ASCORBIC ACID 500 MG
1000 TABLET ORAL DAILY
Status: DISCONTINUED | OUTPATIENT
Start: 2024-05-17 | End: 2024-05-17

## 2024-05-17 RX ORDER — ACETAMINOPHEN 500 MG
5 TABLET ORAL NIGHTLY PRN
Status: DISCONTINUED | OUTPATIENT
Start: 2024-05-17 | End: 2024-05-19 | Stop reason: HOSPADM

## 2024-05-17 RX ORDER — ASPIRIN 81 MG/1
81 TABLET ORAL NIGHTLY
Status: DISCONTINUED | OUTPATIENT
Start: 2024-05-17 | End: 2024-05-19 | Stop reason: HOSPADM

## 2024-05-17 RX ORDER — ASPIRIN 81 MG/1
81 TABLET ORAL NIGHTLY
COMMUNITY
End: 2024-06-11 | Stop reason: HOSPADM

## 2024-05-17 RX ORDER — MEROPENEM 1 G/1
1 INJECTION, POWDER, FOR SOLUTION INTRAVENOUS EVERY 12 HOURS
Status: DISCONTINUED | OUTPATIENT
Start: 2024-05-17 | End: 2024-05-19 | Stop reason: HOSPADM

## 2024-05-17 RX ORDER — POLYETHYLENE GLYCOL 3350 17 G/17G
17 POWDER, FOR SOLUTION ORAL DAILY PRN
Status: DISCONTINUED | OUTPATIENT
Start: 2024-05-17 | End: 2024-05-19 | Stop reason: HOSPADM

## 2024-05-17 RX ORDER — ERGOCALCIFEROL 1.25 MG/1
1250 CAPSULE ORAL
Status: DISCONTINUED | OUTPATIENT
Start: 2024-05-19 | End: 2024-05-19 | Stop reason: HOSPADM

## 2024-05-17 RX ORDER — VANCOMYCIN HYDROCHLORIDE 1 G/20ML
INJECTION, POWDER, LYOPHILIZED, FOR SOLUTION INTRAVENOUS DAILY PRN
Status: DISCONTINUED | OUTPATIENT
Start: 2024-05-17 | End: 2024-05-19

## 2024-05-17 RX ORDER — ACETAMINOPHEN 325 MG/1
650 TABLET ORAL EVERY 4 HOURS PRN
Status: DISCONTINUED | OUTPATIENT
Start: 2024-05-17 | End: 2024-05-19 | Stop reason: HOSPADM

## 2024-05-17 RX ORDER — FERROUS SULFATE 325(65) MG
65 TABLET ORAL 2 TIMES DAILY
Status: DISCONTINUED | OUTPATIENT
Start: 2024-05-17 | End: 2024-05-19 | Stop reason: HOSPADM

## 2024-05-17 RX ORDER — ONDANSETRON 4 MG/1
4 TABLET, FILM COATED ORAL EVERY 8 HOURS PRN
Status: DISCONTINUED | OUTPATIENT
Start: 2024-05-17 | End: 2024-05-19 | Stop reason: HOSPADM

## 2024-05-17 RX ORDER — NITROGLYCERIN 0.4 MG/1
0.4 TABLET SUBLINGUAL EVERY 5 MIN PRN
Status: DISCONTINUED | OUTPATIENT
Start: 2024-05-17 | End: 2024-05-19 | Stop reason: HOSPADM

## 2024-05-17 RX ORDER — ROSUVASTATIN CALCIUM 10 MG/1
10 TABLET, COATED ORAL NIGHTLY
Status: DISCONTINUED | OUTPATIENT
Start: 2024-05-17 | End: 2024-05-19 | Stop reason: HOSPADM

## 2024-05-17 RX ORDER — ONDANSETRON HYDROCHLORIDE 2 MG/ML
4 INJECTION, SOLUTION INTRAVENOUS EVERY 8 HOURS PRN
Status: DISCONTINUED | OUTPATIENT
Start: 2024-05-17 | End: 2024-05-19 | Stop reason: HOSPADM

## 2024-05-17 RX ORDER — FUROSEMIDE 40 MG/1
20 TABLET ORAL DAILY
Status: DISCONTINUED | OUTPATIENT
Start: 2024-05-17 | End: 2024-05-17

## 2024-05-17 RX ORDER — SODIUM CHLORIDE, SODIUM LACTATE, POTASSIUM CHLORIDE, CALCIUM CHLORIDE 600; 310; 30; 20 MG/100ML; MG/100ML; MG/100ML; MG/100ML
100 INJECTION, SOLUTION INTRAVENOUS CONTINUOUS
Status: DISCONTINUED | OUTPATIENT
Start: 2024-05-17 | End: 2024-05-19 | Stop reason: HOSPADM

## 2024-05-17 RX ORDER — ACETAMINOPHEN 160 MG/5ML
650 SOLUTION ORAL EVERY 4 HOURS PRN
Status: DISCONTINUED | OUTPATIENT
Start: 2024-05-17 | End: 2024-05-19 | Stop reason: HOSPADM

## 2024-05-17 RX ORDER — WARFARIN 2 MG/1
2 TABLET ORAL DAILY
Status: DISCONTINUED | OUTPATIENT
Start: 2024-05-17 | End: 2024-05-19 | Stop reason: HOSPADM

## 2024-05-17 RX ORDER — METOPROLOL TARTRATE 25 MG/1
25 TABLET, FILM COATED ORAL 2 TIMES DAILY
Status: DISCONTINUED | OUTPATIENT
Start: 2024-05-17 | End: 2024-05-19 | Stop reason: HOSPADM

## 2024-05-17 RX ORDER — TRAZODONE HYDROCHLORIDE 50 MG/1
100 TABLET ORAL NIGHTLY
Status: DISCONTINUED | OUTPATIENT
Start: 2024-05-17 | End: 2024-05-19 | Stop reason: HOSPADM

## 2024-05-17 RX ORDER — PROMETHAZINE HYDROCHLORIDE AND DEXTROMETHORPHAN HYDROBROMIDE 6.25; 15 MG/5ML; MG/5ML
1.25 SYRUP ORAL 4 TIMES DAILY PRN
Status: DISCONTINUED | OUTPATIENT
Start: 2024-05-17 | End: 2024-05-19 | Stop reason: HOSPADM

## 2024-05-17 RX ORDER — FLUCONAZOLE 2 MG/ML
200 INJECTION, SOLUTION INTRAVENOUS EVERY 24 HOURS
Status: DISCONTINUED | OUTPATIENT
Start: 2024-05-17 | End: 2024-05-18

## 2024-05-17 RX ORDER — PROMETHAZINE HYDROCHLORIDE AND DEXTROMETHORPHAN HYDROBROMIDE 6.25; 15 MG/5ML; MG/5ML
1.25 SYRUP ORAL 4 TIMES DAILY PRN
COMMUNITY

## 2024-05-17 RX ADMIN — ROSUVASTATIN CALCIUM 10 MG: 10 TABLET, FILM COATED ORAL at 21:36

## 2024-05-17 RX ADMIN — WARFARIN SODIUM 2 MG: 2 TABLET ORAL at 19:01

## 2024-05-17 RX ADMIN — PIPERACILLIN SODIUM AND TAZOBACTAM SODIUM 3.38 G: 3; .375 INJECTION, SOLUTION INTRAVENOUS at 16:25

## 2024-05-17 RX ADMIN — FERROUS SULFATE TAB 325 MG (65 MG ELEMENTAL FE) 1 TABLET: 325 (65 FE) TAB at 08:53

## 2024-05-17 RX ADMIN — ACETAMINOPHEN 650 MG: 325 TABLET ORAL at 19:00

## 2024-05-17 RX ADMIN — SODIUM CHLORIDE, POTASSIUM CHLORIDE, SODIUM LACTATE AND CALCIUM CHLORIDE 2000 ML: 600; 310; 30; 20 INJECTION, SOLUTION INTRAVENOUS at 00:05

## 2024-05-17 RX ADMIN — SODIUM CHLORIDE, POTASSIUM CHLORIDE, SODIUM LACTATE AND CALCIUM CHLORIDE 1000 ML: 600; 310; 30; 20 INJECTION, SOLUTION INTRAVENOUS at 21:08

## 2024-05-17 RX ADMIN — FERROUS SULFATE TAB 325 MG (65 MG ELEMENTAL FE) 1 TABLET: 325 (65 FE) TAB at 21:36

## 2024-05-17 RX ADMIN — METOPROLOL TARTRATE 25 MG: 25 TABLET, FILM COATED ORAL at 08:53

## 2024-05-17 RX ADMIN — FUROSEMIDE 20 MG: 40 TABLET ORAL at 08:53

## 2024-05-17 RX ADMIN — PIPERACILLIN SODIUM AND TAZOBACTAM SODIUM 3.38 G: 3; .375 INJECTION, SOLUTION INTRAVENOUS at 10:00

## 2024-05-17 RX ADMIN — MEROPENEM 1 G: 1 INJECTION, POWDER, FOR SOLUTION INTRAVENOUS at 22:38

## 2024-05-17 RX ADMIN — SODIUM CHLORIDE 10 ML: 9 INJECTION, SOLUTION INTRAVENOUS at 15:00

## 2024-05-17 RX ADMIN — ONDANSETRON 4 MG: 2 INJECTION INTRAMUSCULAR; INTRAVENOUS at 08:52

## 2024-05-17 RX ADMIN — TRAZODONE HYDROCHLORIDE 100 MG: 50 TABLET ORAL at 21:36

## 2024-05-17 RX ADMIN — POLYETHYLENE GLYCOL 3350 17 G: 17 POWDER, FOR SOLUTION ORAL at 08:51

## 2024-05-17 RX ADMIN — ASPIRIN 81 MG: 81 TABLET, COATED ORAL at 21:36

## 2024-05-17 RX ADMIN — Medication 2 L/MIN: at 21:52

## 2024-05-17 RX ADMIN — ACETAMINOPHEN 650 MG: 325 TABLET ORAL at 08:52

## 2024-05-17 RX ADMIN — PROMETHAZINE HYDROCHLORIDE AND DEXTROMETHORPHAN HYDROBROMIDE ORAL 1.25 ML: 15; 6.25 SOLUTION ORAL at 22:49

## 2024-05-17 RX ADMIN — Medication 1000 MG: at 08:52

## 2024-05-17 RX ADMIN — FLUCONAZOLE 200 MG: 2 INJECTION, SOLUTION INTRAVENOUS at 23:22

## 2024-05-17 RX ADMIN — SODIUM CHLORIDE, SODIUM LACTATE, POTASSIUM CHLORIDE, AND CALCIUM CHLORIDE 100 ML/HR: 600; 310; 30; 20 INJECTION, SOLUTION INTRAVENOUS at 08:45

## 2024-05-17 ASSESSMENT — COGNITIVE AND FUNCTIONAL STATUS - GENERAL
WALKING IN HOSPITAL ROOM: A LITTLE
CLIMB 3 TO 5 STEPS WITH RAILING: TOTAL
MOBILITY SCORE: 14
STANDING UP FROM CHAIR USING ARMS: A LITTLE
MOVING FROM LYING ON BACK TO SITTING ON SIDE OF FLAT BED WITH BEDRAILS: A LOT
TURNING FROM BACK TO SIDE WHILE IN FLAT BAD: A LOT
MOVING TO AND FROM BED TO CHAIR: A LITTLE

## 2024-05-17 ASSESSMENT — ENCOUNTER SYMPTOMS
DIZZINESS: 0
CHEST TIGHTNESS: 0
ABDOMINAL DISTENTION: 0
WEAKNESS: 1
NAUSEA: 1
CONSTIPATION: 1
SHORTNESS OF BREATH: 0
MUSCULOSKELETAL NEGATIVE: 1
PALPITATIONS: 0
VOMITING: 1
FEVER: 0
LIGHT-HEADEDNESS: 0
ACTIVITY CHANGE: 1
APPETITE CHANGE: 1
FREQUENCY: 0
DYSURIA: 0
UNEXPECTED WEIGHT CHANGE: 0
EYES NEGATIVE: 1
ENDOCRINE NEGATIVE: 1
DIARRHEA: 0
DIFFICULTY URINATING: 1
CHILLS: 0
ABDOMINAL PAIN: 0
FATIGUE: 1
PSYCHIATRIC NEGATIVE: 1

## 2024-05-17 ASSESSMENT — PAIN SCALES - GENERAL
PAINLEVEL_OUTOF10: 0 - NO PAIN

## 2024-05-17 ASSESSMENT — PAIN - FUNCTIONAL ASSESSMENT
PAIN_FUNCTIONAL_ASSESSMENT: 0-10
PAIN_FUNCTIONAL_ASSESSMENT: 0-10

## 2024-05-17 NOTE — PROGRESS NOTES
Physical Therapy    Physical Therapy Evaluation    Patient Name: Vinicius Arriola  MRN: [id number]  Today's Date: 5/17/2024   Time Calculation  Start Time: 1505  Stop Time: 1522  Time Calculation (min): 17 min    Assessment/Plan   PT Assessment  PT Assessment Results: Decreased endurance, Impaired balance, Decreased mobility  Rehab Prognosis: Good  Evaluation/Treatment Tolerance: Patient limited by fatigue  Medical Staff Made Aware: Yes  Strengths: Support of Caregivers, Living arrangement secure, Housing layout, Access to adaptive/assistive products  Barriers to Participation: Comorbidities  End of Session Communication: Bedside nurse  End of Session Patient Position: Up in chair, Alarm on  IP OR SWING BED PT PLAN  Inpatient or Swing Bed: Inpatient  PT Plan  Treatment/Interventions: Bed mobility, Transfer training, Gait training, Balance training, Strengthening, Endurance training, Therapeutic exercise  PT Plan: Skilled PT  PT Frequency: 3 times per week  PT Discharge Recommendations: Low intensity level of continued care, Moderate intensity level of continued care (depending on progress in acute care)  PT Recommended Transfer Status: Assist x1, Assistive device  Physical Therapy eval completed per MD requisition. P.T. recommendations as outlined above. Recommend D/C from acute care when medically appropriate as deemed by medical staff.    Subjective           General Visit Information:  General  Reason for Referral: impaired mobility  Referred By: LIZ Garber CNP (PT/OT 5/17)  Past Medical History Relevant to Rehab: includes: CAD, HLD, DVT, TIA, OA, A-fib, PAD, PE, mitral valve regurgitation, bradycardia, PPM, mesothelioma, R ankle fracture, acute resp. failure with hypoxia, Covid. Started Chemo 5/13/2024.  Family/Caregiver Present: Yes  Caregiver Feedback: Wife supportive  Patient Position Received: Bed, 3 rail up, Alarm off, not on at start of session  Preferred Learning Style: auditory, verbal  General Comment: Pt.  is an 89yo who presented to [facility] ED on 5/16/2024 with fatigue, inability to urinate x few hours.    ABD/Pelvic CT (5/16) (+) diverticulitis, (+) B renal atrophy with L renal calculus    CXR (5/16) (+) complete opacification L hemithorax    Dx: sepsis, urinary retention    Home Living:  Home Living  Home Living Comments: Pt. lives with spouse in a 1 level house with 4 ELIZABETH with HR. Bed/bath on 1st floor with walkin shower with a seat and grab bars.    Prior Level of Function:  Prior Function Per Pt/Caregiver Report  Prior Function Comments: Pt. amb with FWW inside and a RW outside PTA. Pt was I with ADLs and spouse completed IADLs PTA.  Pt. denied falls in last 3 months. Pt. does not drive.    Precautions:  Precautions  Medical Precautions:  (Actiity order: OOB with A)  Precautions Comment: Per EMR: Medium fall risk         Objective     Pain:  Pain Assessment  Pain Assessment: 0-10  Pain Score: 0 - No pain    Cognition:  Cognition  Overall Cognitive Status: Within Functional Limits    General Assessments:  General Observation  General Observation: gilda Forrest   Activity Tolerance  Endurance: Decreased tolerance for upright activites                 Dynamic Sitting Balance  Dynamic Sitting-Comments: Good static and dynamic sitting balance  Dynamic Standing Balance  Dynamic Standing-Comments: Fair+ static and dynamic standing balance    Functional Assessments:     Bed Mobility  Bed Mobility: Yes  Bed Mobility 1  Bed Mobility 1: Supine to sitting  Level of Assistance 1: Moderate assistance  Bed Mobility Comments 1: A to maneuver LEs over EOB and to elevate trunk from bed  Transfers  Transfer: Yes  Transfer 1  Technique 1: Sit to stand  Transfer Device 1:  (FWW)  Transfer Level of Assistance 1: Minimum assistance  Trials/Comments 1: A for lifting, transferring weight over feet, steadying. VC's for hand placement  Transfers 2  Technique 2: Stand to sit  Transfer Device 2:  (FWW)  Transfer Level of Assistance 2: Contact  guard  Trials/Comments 2: CGA for safety  Transfers 3  Technique 3: Stand pivot  Transfer Device 3:  (FWW)  Transfer Level of Assistance 3: Contact guard  Trials/Comments 3: CGA for safety  Ambulation/Gait Training  Ambulation/Gait Training Performed: Yes  Ambulation/Gait Training 1  Surface 1: Level tile  Device 1: Rolling walker  Assistance 1: Minimum assistance  Quality of Gait 1: Narrow base of support (slow, step-to gait with shortend step lengths. inceased B lateral sway)  Comments/Distance (ft) 1: 8'; A for balance, safety  Stairs  Stairs: No       Extremity/Trunk Assessments:        RLE   RLE : Within Functional Limits  LLE   LLE : Within Functional Limits    Outcome Measures:  [facility] Basic Mobility  Turning from your back to your side while in a flat bed without using bedrails: A lot  Moving from lying on your back to sitting on the side of a flat bed without using bedrails: A lot  Moving to and from bed to chair (including a wheelchair): A little  Standing up from a chair using your arms (e.g. wheelchair or bedside chair): A little  To walk in hospital room: A little  Climbing 3-5 steps with railing: Total  Basic Mobility - Total Score: 14                            Goals:  Encounter Problems       Encounter Problems (Active)       PT Problem       Pt. will transfer supine/sit with CGA (Progressing)       Start:  05/17/24    Expected End:  05/31/24            Pt. will transfer sit/stand with FWW with MOD I (Progressing)       Start:  05/17/24    Expected End:  05/31/24            Pt. will complete stand pivot transfers with FWW with MOD I (Progressing)       Start:  05/17/24    Expected End:  05/31/24            Pt.will ambulate 50' with FWW with MOD I (Progressing)       Start:  05/17/24    Expected End:  05/31/24            Pt. will amb up/down 4 steps with HR and cane with MIN A x 1 (Not Progressing)       Start:  05/17/24    Expected End:  05/31/24            Pt. will perform 2 x 15 B LE AROM exercises   (Not Progressing)       Start:  05/17/24    Expected End:  05/31/24                 Education Documentation  Mobility Training, taught by Tone Oglesby, PT at 5/17/2024  3:43 PM.  Learner: Patient  Readiness: Acceptance  Method: Explanation  Response: Verbalizes Understanding, Needs Reinforcement  Comment: Role of PT, transfers, amb, safety, PT POC

## 2024-05-17 NOTE — H&P
History Of Present Illness  Vinicius Arriola is a 88 y.o. male presents to [facility] ER with chief complaint of fatigue, nausea and vomiting. Patient with history of mesothelioma who got his first dose of chemo on Monday, history of PAD, A-fib 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, mitral regurgitation, hyperlipidemia and CAD who presents to the Emergency Department with a chief complaint of fatigue since he got his chemo Monday, experiencing nausea and vomiting since yesterday and now inability urinate for the last few hours. Patient denied any new pain anywhere as well as denies any dysuria increased urinary frequency or hematuria. He reported no focal weakness but reports generalized fatigue since the chemo. No fevers or chills, chest pain,abd pain, shortness of breath. Was found to be hypotensive and tachycardia, sepsis protocol activated, received 2 L of IV fluids in the ED, fluid responsive in the ED. Started on Vancomycin and Zosyn and admitted for sepsis work up.      Past Medical History  Arthritis  Atherosclerosis of native artery of both lower extremities with intermittent claudication (CMS-HCC)  Atrial fibrillation (Multi)  Atrial flutter (Multi)  Sinus node dysfunction (Multi)  Benign essential hypertension  Bradycardia  CAD (coronary atherosclerotic disease)  Chest pain  Difficulty breathing  Excessive daytime sleepiness  Fracture of lateral malleolus of right ankle  H/O malignant mesothelioma  Intermittent claudication (CMS-HCC)  Knee pain, right  Mesothelioma, malignant (Multi)  Mitral regurgitation  Mixed hyperlipidemia  Pacemaker  Pain and swelling of left lower extremity  Knee osteoarthritis  Patellofemoral arthritis  Pneumothorax  Polycythemia vera (Multi)  Sleep apnea  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   PET scan performed earlier this month shows changes consistent with mets the and [place] with possible metastasis to the left hilar lymph nodes and the posterior left 10th and 11th rib. May 2024  CHF    Surgical History  Procedure Laterality Date   BLEPHAROPLASTY UPPER EYELID W/EXCESSIVE SKIN 06/16/2008   Ptosis Repair   HERNIA REPAIR HX   PAST SURGICAL HISTORY OF   cardiac pacemaker-generator on right chest   PAST SURGICAL HISTORY OF   bilateral total knee replacement   TRANSCATH STENT INIT VESSEL,PERCUT 2007   Dr. Diamond   XCAPSL CTRC RMVL INSJ IO LENS PROSTH W/O ECP Right 10/31/2002   Cataract Extraction with PC IOL OD by Dr. Neal   XCAPSL CTRC RMVL INSJ IO LENS PROSTH W/O ECP Left 11/21/2002   Cataract Extraction with PC IOL OS by Dr. Neal      Social History  He reports that he quit smoking about 57 years ago. His smoking use included cigarettes. He started smoking about 67 years ago. He has a 10 pack-year smoking history. He has never used smokeless tobacco. He reports current alcohol use of about 4.0 standard drinks of alcohol per week. He reports that he does not use drugs.  Smoking status: Former   Smokeless tobacco: Never   Tobacco comments:   quit 1960s   Vaping Use   Vaping Use: Never used   Substance Use Topics   Alcohol use: Yes   Comment: socially   Drug use: Never     Family History  Problem Relation Age of Onset   Cataract Father   Glaucoma Father   Macular Degen Father   Hypertension Father   Heart Father   Cancer Father      Allergies  Benadryl allergy decongestant, Diphenhydramine, and Diphenhydramine hcl    Review of Systems   Constitutional:  Positive for activity change, appetite change and fatigue. Negative for chills, fever  "and unexpected weight change.   HENT: Negative.     Eyes: Negative.    Respiratory:  Negative for chest tightness and shortness of breath.    Cardiovascular:  Negative for chest pain, palpitations and leg swelling.   Gastrointestinal:  Positive for constipation, nausea and vomiting. Negative for abdominal distention, abdominal pain and diarrhea.   Endocrine: Negative.    Genitourinary:  Positive for difficulty urinating. Negative for dysuria, frequency and urgency.   Musculoskeletal: Negative.    Skin: Negative.    Allergic/Immunologic: Positive for immunocompromised state.   Neurological:  Positive for weakness. Negative for dizziness, syncope and light-headedness.   Psychiatric/Behavioral: Negative.        ROS: 12 systems reviewed and negative except per HPI above     Physical Exam by System:     Constitutional: Well developed, awake/alert/oriented x3, no distress, alert and cooperative   ENMT: mucous membranes moist   Head/Neck: Neck supple   Respiratory/Thorax: Patent airways, CTAB, normal breath sounds with good chest expansion, thorax symmetric   Cardiovascular: Regular, rate and rhythm, no murmurs, 2+ equal pulses of the extremities, normal S 1and S 2   Gastrointestinal: Distended-centrally obese,  soft, non-tender, no rebound tenderness or guarding, no masses palpable, no organomegaly, +BS, no bruits, Forrest placed in ED   Musculoskeletal: ROM intact, no joint swelling, normal strength   Extremities: normal extremities, no cyanosis edema, contusions or wounds, no clubbing   Neurological: alert and oriented x3, intact senses, motor, response and reflexes, normal strength       Last Recorded Vitals  Blood pressure 101/59, pulse 78, temperature 37.9 °C (100.2 °F), temperature source Temporal, resp. rate (!) 26, height 1.727 m (5' 8\"), weight 108 kg (238 lb), SpO2 95%.    Relevant Results  Results for orders placed or performed during the hospital encounter of 05/16/24 (from the past 24 hour(s))   Urinalysis with " Reflex Culture and Microscopic   Result Value Ref Range    Color, Urine Yellow Straw, Yellow    Appearance, Urine Clear Clear    Specific Gravity, Urine 1.014 1.005 - 1.035    pH, Urine 5.0 5.0, 5.5, 6.0, 6.5, 7.0, 7.5, 8.0    Protein, Urine NEGATIVE NEGATIVE mg/dL    Glucose, Urine NEGATIVE NEGATIVE mg/dL    Blood, Urine NEGATIVE NEGATIVE    Ketones, Urine NEGATIVE NEGATIVE mg/dL    Bilirubin, Urine NEGATIVE NEGATIVE    Urobilinogen, Urine <2.0 <2.0 mg/dL    Nitrite, Urine NEGATIVE NEGATIVE    Leukocyte Esterase, Urine NEGATIVE NEGATIVE   Blood Culture    Specimen: Peripheral Venipuncture; Blood culture   Result Value Ref Range    Blood Culture Loaded on Instrument - Culture in progress    Blood Culture    Specimen: Peripheral Venipuncture; Blood culture   Result Value Ref Range    Blood Culture Loaded on Instrument - Culture in progress    CBC and Auto Differential   Result Value Ref Range    WBC 11.2 4.4 - 11.3 x10*3/uL    nRBC 0.0 0.0 - 0.0 /100 WBCs    RBC 4.62 4.50 - 5.90 x10*6/uL    Hemoglobin 13.4 (L) 13.5 - 17.5 g/dL    Hematocrit 40.9 (L) 41.0 - 52.0 %    MCV 89 80 - 100 fL    MCH 29.0 26.0 - 34.0 pg    MCHC 32.8 32.0 - 36.0 g/dL    RDW 16.6 (H) 11.5 - 14.5 %    Platelets 197 150 - 450 x10*3/uL    Neutrophils % 91.3 40.0 - 80.0 %    Immature Granulocytes %, Automated 0.3 0.0 - 0.9 %    Lymphocytes % 5.1 13.0 - 44.0 %    Monocytes % 2.9 2.0 - 10.0 %    Eosinophils % 0.1 0.0 - 6.0 %    Basophils % 0.3 0.0 - 2.0 %    Neutrophils Absolute 10.20 (H) 1.60 - 5.50 x10*3/uL    Immature Granulocytes Absolute, Automated 0.03 0.00 - 0.50 x10*3/uL    Lymphocytes Absolute 0.57 (L) 0.80 - 3.00 x10*3/uL    Monocytes Absolute 0.32 0.05 - 0.80 x10*3/uL    Eosinophils Absolute 0.01 0.00 - 0.40 x10*3/uL    Basophils Absolute 0.03 0.00 - 0.10 x10*3/uL   Comprehensive Metabolic Panel   Result Value Ref Range    Glucose 97 74 - 99 mg/dL    Sodium 132 (L) 136 - 145 mmol/L    Potassium 4.5 3.5 - 5.3 mmol/L    Chloride 97 (L) 98 -  107 mmol/L    Bicarbonate 25 21 - 32 mmol/L    Anion Gap 15 10 - 20 mmol/L    Urea Nitrogen 29 (H) 6 - 23 mg/dL    Creatinine 1.34 (H) 0.50 - 1.30 mg/dL    eGFR 52 (L) >60 mL/min/1.73m*2    Calcium 9.4 8.6 - 10.3 mg/dL    Albumin 3.8 3.4 - 5.0 g/dL    Alkaline Phosphatase 61 33 - 136 U/L    Total Protein 7.0 6.4 - 8.2 g/dL    AST 17 9 - 39 U/L    Bilirubin, Total 0.5 0.0 - 1.2 mg/dL    ALT 9 (L) 10 - 52 U/L   Lactate   Result Value Ref Range    Lactate 1.5 0.4 - 2.0 mmol/L   Protime-INR   Result Value Ref Range    Protime 19.9 (H) 9.8 - 12.8 seconds    INR 1.8 (H) 0.9 - 1.1   Phosphorus   Result Value Ref Range    Phosphorus 2.5 2.5 - 4.9 mg/dL   Magnesium   Result Value Ref Range    Magnesium 1.87 1.60 - 2.40 mg/dL   Troponin I, High Sensitivity, Initial   Result Value Ref Range    Troponin I, High Sensitivity 23 (H) 0 - 20 ng/L   Troponin, High Sensitivity, 1 Hour   Result Value Ref Range    Troponin I, High Sensitivity 27 (H) 0 - 20 ng/L      Scheduled medications    Continuous medications    PRN medications       CT abdomen pelvis w IV contrast    Result Date: 5/17/2024  STUDY: CT Abdomen and Pelvis with IV Contrast; 05/16/2024 10:27 pm INDICATION: Abdominal pain.  Distension. COMPARISON: CT CAP 06/14/2023 and 03/21/2023. ACCESSION NUMBER(S): [id number] ORDERING CLINICIAN: KAILA HALEY TECHNIQUE: CT of the abdomen and pelvis was performed.  Contiguous axial images were obtained at 3 mm slice thickness through the abdomen and pelvis. Coronal and sagittal reconstructions at 3 mm slice thickness were performed.  Omnipaque 350, 75 mL was administered intravenously.  FINDINGS: LOWER CHEST: Cardiac size is enlarged with pacer leads in the right atrium and right ventricle.  Moderate calcified coronary plaque is noted.  Mild calcified plaque is seen in the included descending thoracic aorta. Thoracic aorta is not aneurysmal.  There is complete collapse and consolidation of the left lung at the left lung base with  loculated effusion seen at the left lung base.  Increased AP diameter of the thorax suggests chronic changes of COPD.  Respiratory motion limits assessment in the lung bases and upper abdomen.  Small sliding-type hiatal hernia is noted.  ABDOMEN:  LIVER: Simple fluid density cysts are noted in the dome of the liver along with subcentimeter hypodensities in the liver which are too small to definitively characterize but statistically most likely represent simple cysts or hemangiomas.  No hepatomegaly.   BILE DUCTS: No intrahepatic or extrahepatic biliary ductal dilatation.  GALLBLADDER: Gallbladder contains gallstones in the gallbladder neck but is otherwise unremarkable.  STOMACH: No abnormalities identified.  PANCREAS: No masses or ductal dilatation.  SPLEEN: No splenomegaly or focal splenic lesion.  ADRENAL GLANDS: No thickening or nodules.  KIDNEYS AND URETERS: Kidneys are normal in location.  Moderate bilateral renal cortical thinning is seen with mild bilateral perinephric stranding. Nonobstructing 6 mm calculus is seen in the mid left kidney.  No ureteral calculi.  PELVIS:  BLADDER: Urinary bladder is decompressed by Forrest catheter, limiting assessment.   REPRODUCTIVE ORGANS: No abnormalities identified.  BOWEL: Diverticulosis is noted throughout the descending colon and sigmoid colon.  There is subtle fat stranding adjacent to the proximal sigmoid colon in the left upper pelvis which may represent mild acute diverticulitis.  Appendix is not definitively identified.  Terminal ileum is unremarkable.   VESSELS: Mild calcified atheromatous plaque is seen in the abdominal aorta and iliac arteries.  No aneurysm.  PERITONEUM/RETROPERITONEUM/LYMPH NODES: No free fluid.  No pneumoperitoneum. No lymphadenopathy.  ABDOMINAL WALL: No abnormalities identified. SOFT TISSUES: No abnormalities identified.  BONES: No acute fracture or aggressive osseous lesion.  Generalized osseous demineralization is noted.  There is a mild  levoconvex curvature of the lumbar spine centered at L4.  Moderate multilevel disc disease is seen in the spine.  There is moderate bilateral mid and lower lumbar facet arthrosis.    1.  Subtle inflammatory change along the proximal sigmoid colon which may represent a low-grade acute diverticulitis.  No definite perforation or abscess. 2.  Complete collapse and consolidation of the left lung with a loculated effusion at the left lung base, unchanged compared to the prior chest CT and likely consistent with patient's reported mesothelioma, possibly chronic post treatment change. 3.  Cardiomegaly with chronic changes suggesting COPD. 4.  Cholelithiasis. 5.  Moderate bilateral renal atrophy with nonobstructing 6 mm left renal calculus. 6.  Additional chronic changes as described. Signed by Lucas Owen    XR chest 1 view    Result Date: 5/16/2024  STUDY: Chest Radiograph;  5/16/2024 9:40 PM INDICATION: Weakness. COMPARISON: 12/10/2023 XR Chest. ACCESSION NUMBER(S): [id number] ORDERING CLINICIAN: KAILA HALEY TECHNIQUE:  Frontal chest was obtained at 21:40 hours. FINDINGS: CARDIOMEDIASTINAL SILHOUETTE: Cardiomediastinal silhouette is normal in size and configuration.  LUNGS: Complete opacification left hemithorax present.  This appears chronic likely related to volume loss and chronic consolidation.  This appears similar prior exams.  ABDOMEN: No remarkable upper abdominal findings.  BONES: No acute osseous changes.    Complete opacification left hemithorax likely related to volume loss and chronic consolidation. Signed by Andrade Angel MD       Assessment/Plan   Active Problems:    Atrial fibrillation (Multi)    Benign essential hypertension    Mesothelioma, malignant (Multi)    Polycythemia vera (Multi)    Sleep apnea    Long term (current) use of anticoagulants    Generalized weakness    Acute diverticulitis    Sepsis (Multi)  hypotension  Elevated creatine  Elevated troponin, flat without chest pain complaint    Nausea and vomiting  Constipation    Plan:    Admit to inpatient with tele  Sepsis alert-hypotension tachycardia, organ dysfunction noted-elevated creatine   Received 2 L LR in the ED, fluid responsive   Cont  ml hour  Started on Vancomycin and Zosyn-immunocompromised state-de escalate as able, Zosyn will cover acute diverticulitis, work up in progress  Blood cultures collected in ED, follow up on cultures  Obtain urinalysis  Forrest placed in ED  MRSA swab ordered  CT abdomen pelvis: Complete collapse and consolidation of the left lung with a  loculated effusion at the left lung base, unchanged compared to the  prior chest CT and likely consistent with patient's reported  mesothelioma,also noting -may represent mild acute diverticulitis.Nonobstructing 6 mm calculus is seen in the mid left kidney.   Lactate 1.5  Zofran for nausea and or vomiting  Deedee lax PRN  PT OT consult   Am labs including cbc, bmp, mag, phos  Resume coumadin, INR 1.8, daily INR's-needs dosages on the reconciliation with dates to take, please re order patient coumadin, this is not completed  PT OT consulted  Dvtp: resume coumadin, SCD's  POC discussed with patient and attending  Dispo: patient likely will require > 2 midnight stays to adequately treat and safe dc plan    I spent >50 minutes in the professional and overall care of this patient.    Discussed with patient at bedside: Patient states he is FULL CODE    SIGNATURE: SULEMA Monge-KERRY PATIENT NAME: Vinicius Arriola   DATE: May 17, 2024 MRN: [id number]   TIME: 2:36 AM

## 2024-05-17 NOTE — CONSULTS
"Vancomycin Dosing by Pharmacy- INITIAL    Vinicius Arriola is a 88 y.o. year old male who Pharmacy has been consulted for vancomycin dosing for other possible sepsis . Based on the patient's indication and renal status this patient will be dosed based on a goal AUC of 500-600.     Renal function is currently stable.    Visit Vitals  /69   Pulse 105   Temp 37.7 °C (99.9 °F)   Resp 20        Lab Results   Component Value Date    CREATININE 1.34 (H) 05/16/2024    CREATININE 1.19 12/18/2023    CREATININE 1.35 (H) 12/16/2023    CREATININE 1.20 12/15/2023        Patient weight is No results found for: \"PTWEIGHT\"    No results found for: \"CULTURE\"     I/O last 3 completed shifts:  In: - (0 mL/kg)   Out: 300 (2.8 mL/kg) [Urine:300 (0.1 mL/kg/hr)]  Weight: 108 kg   [unfilled]    Lab Results   Component Value Date    PATIENTTEMP 37.0 02/17/2023          Assessment/Plan     Patient has already been given a loading dose of 2000 mg.  Will initiate vancomycin maintenance,  1250 mg every 24 hours.    This dosing regimen is predicted by [employer] to result in the following pharmacokinetic parameters:  Regimen: 1250 mg IV every 24 hours.  Start time: 10:55 on 05/17/2024  Exposure target: AUC24 (range)400-600 mg/L.hr   AUC24,ss: 474 mg/L.hr  Probability of AUC24 > 400: 68 %  Ctrough,ss: 15.1 mg/L  Probability of Ctrough,ss > 20: 26 %  Probability of nephrotoxicity (Lodise PRAFUL 2009): 10 %      Follow-up level will be ordered on 05/17/24 at 0900 and 05/18/24 at 0500 unless clinically indicated sooner.  Will continue to monitor renal function daily while on vancomycin and order serum creatinine at least every 48 hours if not already ordered.  Follow for continued vancomycin needs, clinical response, and signs/symptoms of toxicity.       Yadira Delatorre, PharmD       "

## 2024-05-17 NOTE — PROGRESS NOTES
"Daily Progress Note    Vinicius Arriola is a 88 y.o. male on day 0 of admission presenting with Sepsis, due to unspecified organism, unspecified whether acute organ dysfunction present (Multi).    Subjective   Patient seen resting quietly.  Patient complaining of feeling weak and tired.  Symptoms started after first dose of chemo on Tuesday.  Continue IV fluids and antibiotics.  Family at bedside and updated.       Objective     Physical Exam    Physical Exam  Constitutional:       Appearance: He is obese.   HENT:      Head: Normocephalic.      Mouth/Throat:      Mouth: Mucous membranes are moist.   Eyes:      Pupils: Pupils are equal, round, and reactive to light.   Cardiovascular:      Rate and Rhythm: Normal rate and regular rhythm.      Heart sounds: Normal heart sounds, S1 normal and S2 normal.   Pulmonary:      Effort: Pulmonary effort is normal.      Breath sounds: Normal breath sounds.   Abdominal:      General: Bowel sounds are normal.      Palpations: Abdomen is soft.   Genitourinary:     Comments: Indwelling Forrest catheter  Musculoskeletal:         General: Normal range of motion.      Cervical back: Neck supple.   Skin:     General: Skin is warm.   Neurological:      Mental Status: He is alert and oriented to person, place, and time.      Motor: Weakness present.   Psychiatric:         Mood and Affect: Mood normal.         Behavior: Behavior normal.         Last Recorded Vitals  Blood pressure 141/69, pulse 105, temperature 37.7 °C (99.9 °F), resp. rate 20, height 1.727 m (5' 8\"), weight 110 kg (243 lb 6.4 oz), SpO2 93%.  Intake/Output last 3 Shifts:  I/O last 3 completed shifts:  In: - (0 mL/kg)   Out: 300 (2.8 mL/kg) [Urine:300 (0.1 mL/kg/hr)]  Weight: 108 kg     Medications  Scheduled medications  ascorbic acid, 1,000 mg, oral, Daily  [START ON 5/19/2024] ergocalciferol, 1,250 mcg, oral, Every Sunday  ferrous sulfate (325 mg ferrous sulfate), 65 mg of iron, oral, BID  furosemide, 20 mg, oral, " Daily  metoprolol tartrate, 25 mg, oral, BID  piperacillin-tazobactam, 3.375 g, intravenous, q8h   And  sodium chloride, 10 mL, intravenous, q8h  rosuvastatin, 10 mg, oral, Nightly  traZODone, 100 mg, oral, Nightly  vancomycin, 1,250 mg, intravenous, q24h      Continuous medications  lactated Ringer's, 100 mL/hr, Last Rate: 100 mL/hr (05/17/24 0845)      PRN medications  PRN medications: acetaminophen **OR** acetaminophen **OR** acetaminophen, acetaminophen **OR** acetaminophen **OR** acetaminophen, melatonin, nitroglycerin, ondansetron **OR** ondansetron, oxygen, polyethylene glycol, vancomycin    Labs  CBC:   Results from last 7 days   Lab Units 05/17/24  0901 05/16/24 2113   WBC AUTO x10*3/uL 14.4* 11.2   RBC AUTO x10*6/uL 4.59 4.62   HEMOGLOBIN g/dL 13.2* 13.4*   HEMATOCRIT % 41.1 40.9*   MCV fL 90 89   MCH pg 28.8 29.0   MCHC g/dL 32.1 32.8   RDW % 16.8* 16.6*   PLATELETS AUTO x10*3/uL 192 197     CMP:    Results from last 7 days   Lab Units 05/16/24 2113   SODIUM mmol/L 132*   POTASSIUM mmol/L 4.5   CHLORIDE mmol/L 97*   CO2 mmol/L 25   BUN mg/dL 29*   CREATININE mg/dL 1.34*   GLUCOSE mg/dL 97   PROTEIN TOTAL g/dL 7.0   CALCIUM mg/dL 9.4   BILIRUBIN TOTAL mg/dL 0.5   ALK PHOS U/L 61   AST U/L 17   ALT U/L 9*     BMP:    Results from last 7 days   Lab Units 05/16/24 2113   SODIUM mmol/L 132*   POTASSIUM mmol/L 4.5   CHLORIDE mmol/L 97*   CO2 mmol/L 25   BUN mg/dL 29*   CREATININE mg/dL 1.34*   CALCIUM mg/dL 9.4   GLUCOSE mg/dL 97     Magnesium:  Results from last 7 days   Lab Units 05/16/24 2113   MAGNESIUM mg/dL 1.87     Troponin:    Results from last 7 days   Lab Units 05/16/24  2246 05/16/24 2113   TROPHS ng/L 27* 23*     BNP:     Lipid Panel:  Results from last 7 days   Lab Units 05/16/24  2113 05/15/24  0000   INR EXT   --  2.50   INR  1.8*  --    PROTIME seconds 19.9*  --           Assessment/Plan    Nausea/vomiting with fatigue after chemo  Leukocytosis  Urinary retention  PAD and CAD  Mild acute  diverticulitis  CHF  A-fib  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  Mesothelioma with new mets  Mitral regurg  HLD/HTN  Obesity  Sleep apnea    -Continuous telemetry  -Sepsis alert hypertensive, tachycardia received 2 L LR in ED  -Continue LR  -Continue IV antibiotics  -Blood cultures pending  -Obtain urinalysis, Forrest placed in ED  -Urology consulted for retention  -CT A/P complete collapse and consolidation of the left lung with a loculated effusion at the left lung base unchanged from prior  -CBC, BMP and electrolytes daily  -Daily INR's  -PET scan performed earlier this month shows changes consistent with mets to the left hilar lymph nodes in the posterior left 10th and 11th rib-May 2024  -PT/OT evaluation  -TCC for discharge planning      DVTp: Warfarin    PLAN: Home with wife    Margaret MÉNDEZ SULEMA Milligan-CNP    Plan of care was discussed extensively with patient.  Patient verbalized understanding through teach back method.  All question and concerns addressed upon examination.    Of note, this documentation is completed using the Dragon Dictation system (voice recognition software). There may be spelling and/or grammatical errors that were not corrected prior to final submission.

## 2024-05-17 NOTE — CONSULTS
UROLOGY CONSULT NOTE FOR FRIDAY, 5/17/2024    SUBJECTIVE: Pleasant 88-year-old white male with numerous medical comorbidities referred for urinary retention requiring Forrest catheter placement and drainage  On today's visit the patient is sitting at the bedside with  family present  Feels very weak but well alert and oriented, actually recognize me from my urologic care to him in the years 2008 through 2010 at which time we were following BPH with mild obstructive symptoms treated at that time with Cardura and Avodart, then lost to follow-up.  Verbally describes gradual progressive moderately severe obstructive voiding symptoms to date prior to admission  Slow stream, urinary hesitancy, often had to sit to void and could only have and maintain a weak dribbling stream but verbally reports he thought he emptied well  Otherwise urine was described as clear no dysuria no hematuria no upper tract symptoms no flank pain fever or chills.  On admission he was quite weak and was simply unable to initiate a stream and Forrest catheter placed.  Numerous medical comorbidities as listed and reviewed including fatigue nausea vomiting mesothelioma atrial fibrillation     OBJECTIVE:    on today's visit the patient is sitting at the bedside utilizing a walker to help support his upper body  Forrest catheter in place draining well  Borderline serum creatinine at 1.42, hemoglobin 13.2 with elevated WBCs at [id number] today  Urinalysis essentially negative from May 15  INR is 2.5 on Coumadin  Currently on Zosyn and vancomycin antibiotic prophylaxis  CT abdomen reviewed and agree with the report as noted, 6 mm nonobstructing stone left kidney, observe    ASSESSMENT/PLAN   clinically history of slowly progressive obstructive voiding symptoms probably from prostatic enlargement and bladder neck obstruction resulting in urinary retention when the patient is overall quite weak and Forrest catheter placement  Urinalysis negative, at this point  doubt active UTI, sepsis may be from some other source but agree with aggressive antibiotic coverage as noted  Reviewed status options with patient and family  Will empirically start Flomax and finasteride anticipating some improvement in overall general strength mobility ambulation at which point we will plan Forrest catheter removal with a voiding trial and further reassessment urologically as clinically indicated    Thank you for allowing us to participate in the patient's care and management and we will follow along urologically    Additional medical and historical objective data reviewed and listed below      Current Facility-Administered Medications:     acetaminophen (Tylenol) tablet 650 mg, 650 mg, oral, q4h PRN, 650 mg at 05/17/24 0852 **OR** acetaminophen (Tylenol) oral liquid 650 mg, 650 mg, nasogastric tube, q4h PRN **OR** acetaminophen (Tylenol) suppository 650 mg, 650 mg, rectal, q4h PRN, ALONDRA Monge    acetaminophen (Tylenol) tablet 650 mg, 650 mg, oral, q4h PRN **OR** acetaminophen (Tylenol) oral liquid 650 mg, 650 mg, oral, q4h PRN **OR** acetaminophen (Tylenol) suppository 650 mg, 650 mg, rectal, q4h PRN, ALONDRA Monge    ascorbic acid (Vitamin C) tablet 1,000 mg, 1,000 mg, oral, Daily, Kati Miller, [facility], 1,000 mg at 05/17/24 0852    aspirin EC tablet 81 mg, 81 mg, oral, Nightly, ALONDRA Sharma    [START ON 5/19/2024] ergocalciferol (Vitamin D-2) capsule 1,250 mcg, 1,250 mcg, oral, Every Sunday, ALONDRA Monge    ferrous sulfate (325 mg ferrous sulfate) tablet 1 tablet, 65 mg of iron, oral, BID, ALONDRA Monge, 1 tablet at 05/17/24 0853    furosemide (Lasix) tablet 20 mg, 20 mg, oral, Daily, ALONDRA Monge, 20 mg at 05/17/24 0853    lactated Ringer's infusion, 100 mL/hr, intravenous, Continuous, Diane L Atoka, APRN-CNP, Last Rate: 100 mL/hr at 05/17/24 0845, 100 mL/hr at 05/17/24 0845    melatonin tablet 5 mg, 5 mg,  oral, Nightly PRN, ALONDRA Monge    metoprolol tartrate (Lopressor) tablet 25 mg, 25 mg, oral, BID, ALONDRA Monge, 25 mg at 05/17/24 0853    nitroglycerin (Nitrostat) SL tablet 0.4 mg, 0.4 mg, sublingual, q5 min PRN, ALONDRA Monge    ondansetron (Zofran) tablet 4 mg, 4 mg, oral, q8h PRN **OR** ondansetron (Zofran) injection 4 mg, 4 mg, intravenous, q8h PRN, ALONDRA Monge, 4 mg at 05/17/24 0852    oxygen (O2) therapy, , inhalation, Continuous PRN - O2/gases, ALONDRA Monge    piperacillin-tazobactam-dextrose (Zosyn) IV 3.375 g, 3.375 g, intravenous, q8h **AND** sodium chloride 0.9 % bolus 10 mL, 10 mL, intravenous, q8h, Renee Walker, [facility]    polyethylene glycol (Glycolax, Miralax) packet 17 g, 17 g, oral, Daily PRN, ALONDRA Monge, 17 g at 05/17/24 0851    rosuvastatin (Crestor) tablet 10 mg, 10 mg, oral, Nightly, ALONDRA Monge    traZODone (Desyrel) tablet 100 mg, 100 mg, oral, Nightly, ALONDRA Monge    vancomycin (Vancocin) in dextrose 5 % water (D5W) 250 mL IV 1,250 mg, 1,250 mg, intravenous, q24h, Yadira Delatorre, PharmD    vancomycin (Vancocin) pharmacy to dose - pharmacy monitoring, , miscellaneous, Daily PRN, ALONDRA Monge    warfarin (Coumadin) tablet 2 mg, 2 mg, oral, Daily, ALONDRA Sharma   Results for orders placed or performed during the hospital encounter of 05/16/24 (from the past 96 hour(s))   Urinalysis with Reflex Culture and Microscopic   Result Value Ref Range    Color, Urine Yellow Straw, Yellow    Appearance, Urine Clear Clear    Specific Gravity, Urine 1.014 1.005 - 1.035    pH, Urine 5.0 5.0, 5.5, 6.0, 6.5, 7.0, 7.5, 8.0    Protein, Urine NEGATIVE NEGATIVE mg/dL    Glucose, Urine NEGATIVE NEGATIVE mg/dL    Blood, Urine NEGATIVE NEGATIVE    Ketones, Urine NEGATIVE NEGATIVE mg/dL    Bilirubin, Urine NEGATIVE NEGATIVE    Urobilinogen, Urine <2.0 <2.0 mg/dL    Nitrite,  Urine NEGATIVE NEGATIVE    Leukocyte Esterase, Urine NEGATIVE NEGATIVE   Extra Urine Gray Tube   Result Value Ref Range    Extra Tube Hold for add-ons.    Blood Culture    Specimen: Peripheral Venipuncture; Blood culture   Result Value Ref Range    Blood Culture Loaded on Instrument - Culture in progress    Blood Culture    Specimen: Peripheral Venipuncture; Blood culture   Result Value Ref Range    Blood Culture Loaded on Instrument - Culture in progress    CBC and Auto Differential   Result Value Ref Range    WBC 11.2 4.4 - 11.3 x10*3/uL    nRBC 0.0 0.0 - 0.0 /100 WBCs    RBC 4.62 4.50 - 5.90 x10*6/uL    Hemoglobin 13.4 (L) 13.5 - 17.5 g/dL    Hematocrit 40.9 (L) 41.0 - 52.0 %    MCV 89 80 - 100 fL    MCH 29.0 26.0 - 34.0 pg    MCHC 32.8 32.0 - 36.0 g/dL    RDW 16.6 (H) 11.5 - 14.5 %    Platelets 197 150 - 450 x10*3/uL    Neutrophils % 91.3 40.0 - 80.0 %    Immature Granulocytes %, Automated 0.3 0.0 - 0.9 %    Lymphocytes % 5.1 13.0 - 44.0 %    Monocytes % 2.9 2.0 - 10.0 %    Eosinophils % 0.1 0.0 - 6.0 %    Basophils % 0.3 0.0 - 2.0 %    Neutrophils Absolute 10.20 (H) 1.60 - 5.50 x10*3/uL    Immature Granulocytes Absolute, Automated 0.03 0.00 - 0.50 x10*3/uL    Lymphocytes Absolute 0.57 (L) 0.80 - 3.00 x10*3/uL    Monocytes Absolute 0.32 0.05 - 0.80 x10*3/uL    Eosinophils Absolute 0.01 0.00 - 0.40 x10*3/uL    Basophils Absolute 0.03 0.00 - 0.10 x10*3/uL   Comprehensive Metabolic Panel   Result Value Ref Range    Glucose 97 74 - 99 mg/dL    Sodium 132 (L) 136 - 145 mmol/L    Potassium 4.5 3.5 - 5.3 mmol/L    Chloride 97 (L) 98 - 107 mmol/L    Bicarbonate 25 21 - 32 mmol/L    Anion Gap 15 10 - 20 mmol/L    Urea Nitrogen 29 (H) 6 - 23 mg/dL    Creatinine 1.34 (H) 0.50 - 1.30 mg/dL    eGFR 52 (L) >60 mL/min/1.73m*2    Calcium 9.4 8.6 - 10.3 mg/dL    Albumin 3.8 3.4 - 5.0 g/dL    Alkaline Phosphatase 61 33 - 136 U/L    Total Protein 7.0 6.4 - 8.2 g/dL    AST 17 9 - 39 U/L    Bilirubin, Total 0.5 0.0 - 1.2 mg/dL     ALT 9 (L) 10 - 52 U/L   Lactate   Result Value Ref Range    Lactate 1.5 0.4 - 2.0 mmol/L   Protime-INR   Result Value Ref Range    Protime 19.9 (H) 9.8 - 12.8 seconds    INR 1.8 (H) 0.9 - 1.1   Phosphorus   Result Value Ref Range    Phosphorus 2.5 2.5 - 4.9 mg/dL   Magnesium   Result Value Ref Range    Magnesium 1.87 1.60 - 2.40 mg/dL   Troponin I, High Sensitivity, Initial   Result Value Ref Range    Troponin I, High Sensitivity 23 (H) 0 - 20 ng/L   ECG 12 lead   Result Value Ref Range    Ventricular Rate 110 BPM    Atrial Rate 110 BPM    NJ Interval 224 ms    QRS Duration 86 ms    QT Interval 418 ms    QTC Calculation(Bazett) 565 ms    R Axis 129 degrees    T Axis 180 degrees    QRS Count 18 beats    Q Onset 228 ms    P Onset 116 ms    P Offset 165 ms    T Offset 437 ms    QTC Fredericia 512 ms   Troponin, High Sensitivity, 1 Hour   Result Value Ref Range    Troponin I, High Sensitivity 27 (H) 0 - 20 ng/L   Lavender Top   Result Value Ref Range    Extra Tube Hold for add-ons.    SST TOP   Result Value Ref Range    Extra Tube Hold for add-ons.    CBC   Result Value Ref Range    WBC 14.4 (H) 4.4 - 11.3 x10*3/uL    nRBC 0.0 0.0 - 0.0 /100 WBCs    RBC 4.59 4.50 - 5.90 x10*6/uL    Hemoglobin 13.2 (L) 13.5 - 17.5 g/dL    Hematocrit 41.1 41.0 - 52.0 %    MCV 90 80 - 100 fL    MCH 28.8 26.0 - 34.0 pg    MCHC 32.1 32.0 - 36.0 g/dL    RDW 16.8 (H) 11.5 - 14.5 %    Platelets 192 150 - 450 x10*3/uL   Vancomycin   Result Value Ref Range    Vancomycin 16.1 5.0 - 20.0 ug/mL   Basic metabolic panel   Result Value Ref Range    Glucose 100 (H) 74 - 99 mg/dL    Sodium 134 (L) 136 - 145 mmol/L    Potassium 4.1 3.5 - 5.3 mmol/L    Chloride 95 (L) 98 - 107 mmol/L    Bicarbonate 24 21 - 32 mmol/L    Anion Gap 19 10 - 20 mmol/L    Urea Nitrogen 27 (H) 6 - 23 mg/dL    Creatinine 1.42 (H) 0.50 - 1.30 mg/dL    eGFR 48 (L) >60 mL/min/1.73m*2    Calcium 9.1 8.6 - 10.3 mg/dL   Protime-INR   Result Value Ref Range    Protime 22.5 (H) 9.8 - 12.8  seconds    INR 2.0 (H) 0.9 - 1.1   SST TOP   Result Value Ref Range    Extra Tube Hold for add-ons.      ECG 12 lead    Result Date: 5/17/2024  Sinus tachycardia with 1st degree AV block Lateral infarct , age undetermined Inferior infarct (cited on or before 10-DEC-2023) Prolonged QT Abnormal ECG When compared with ECG of 10-DEC-2023 11:23, Significant changes have occurred See ED provider note for full interpretation and clinical correlation    CT abdomen pelvis w IV contrast    Result Date: 5/17/2024  STUDY: CT Abdomen and Pelvis with IV Contrast; 05/16/2024 10:27 pm INDICATION: Abdominal pain.  Distension. COMPARISON: CT CAP 06/14/2023 and 03/21/2023. ACCESSION NUMBER(S): [id number] ORDERING CLINICIAN: KAILA HALEY TECHNIQUE: CT of the abdomen and pelvis was performed.  Contiguous axial images were obtained at 3 mm slice thickness through the abdomen and pelvis. Coronal and sagittal reconstructions at 3 mm slice thickness were performed.  Omnipaque 350, 75 mL was administered intravenously.  FINDINGS: LOWER CHEST: Cardiac size is enlarged with pacer leads in the right atrium and right ventricle.  Moderate calcified coronary plaque is noted.  Mild calcified plaque is seen in the included descending thoracic aorta. Thoracic aorta is not aneurysmal.  There is complete collapse and consolidation of the left lung at the left lung base with loculated effusion seen at the left lung base.  Increased AP diameter of the thorax suggests chronic changes of COPD.  Respiratory motion limits assessment in the lung bases and upper abdomen.  Small sliding-type hiatal hernia is noted.  ABDOMEN:  LIVER: Simple fluid density cysts are noted in the dome of the liver along with subcentimeter hypodensities in the liver which are too small to definitively characterize but statistically most likely represent simple cysts or hemangiomas.  No hepatomegaly.   BILE DUCTS: No intrahepatic or extrahepatic biliary ductal dilatation.   GALLBLADDER: Gallbladder contains gallstones in the gallbladder neck but is otherwise unremarkable.  STOMACH: No abnormalities identified.  PANCREAS: No masses or ductal dilatation.  SPLEEN: No splenomegaly or focal splenic lesion.  ADRENAL GLANDS: No thickening or nodules.  KIDNEYS AND URETERS: Kidneys are normal in location.  Moderate bilateral renal cortical thinning is seen with mild bilateral perinephric stranding. Nonobstructing 6 mm calculus is seen in the mid left kidney.  No ureteral calculi.  PELVIS:  BLADDER: Urinary bladder is decompressed by Forrest catheter, limiting assessment.   REPRODUCTIVE ORGANS: No abnormalities identified.  BOWEL: Diverticulosis is noted throughout the descending colon and sigmoid colon.  There is subtle fat stranding adjacent to the proximal sigmoid colon in the left upper pelvis which may represent mild acute diverticulitis.  Appendix is not definitively identified.  Terminal ileum is unremarkable.   VESSELS: Mild calcified atheromatous plaque is seen in the abdominal aorta and iliac arteries.  No aneurysm.  PERITONEUM/RETROPERITONEUM/LYMPH NODES: No free fluid.  No pneumoperitoneum. No lymphadenopathy.  ABDOMINAL WALL: No abnormalities identified. SOFT TISSUES: No abnormalities identified.  BONES: No acute fracture or aggressive osseous lesion.  Generalized osseous demineralization is noted.  There is a mild levoconvex curvature of the lumbar spine centered at L4.  Moderate multilevel disc disease is seen in the spine.  There is moderate bilateral mid and lower lumbar facet arthrosis.    1.  Subtle inflammatory change along the proximal sigmoid colon which may represent a low-grade acute diverticulitis.  No definite perforation or abscess. 2.  Complete collapse and consolidation of the left lung with a loculated effusion at the left lung base, unchanged compared to the prior chest CT and likely consistent with patient's reported mesothelioma, possibly chronic post treatment  change. 3.  Cardiomegaly with chronic changes suggesting COPD. 4.  Cholelithiasis. 5.  Moderate bilateral renal atrophy with nonobstructing 6 mm left renal calculus. 6.  Additional chronic changes as described. Signed by Lucas Owen    XR chest 1 view    Result Date: 5/16/2024  STUDY: Chest Radiograph;  5/16/2024 9:40 PM INDICATION: Weakness. COMPARISON: 12/10/2023 XR Chest. ACCESSION NUMBER(S): [id number] ORDERING CLINICIAN: KAILA HALEY TECHNIQUE:  Frontal chest was obtained at 21:40 hours. FINDINGS: CARDIOMEDIASTINAL SILHOUETTE: Cardiomediastinal silhouette is normal in size and configuration.  LUNGS: Complete opacification left hemithorax present.  This appears chronic likely related to volume loss and chronic consolidation.  This appears similar prior exams.  ABDOMEN: No remarkable upper abdominal findings.  BONES: No acute osseous changes.    Complete opacification left hemithorax likely related to volume loss and chronic consolidation. Signed by Andrade Angel MD

## 2024-05-17 NOTE — ED PROVIDER NOTES
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  Patient denied any new pain anywhere as well as denies any dysuria increased urinary frequency or hematuria.  He reports no bowel movement throughout the day and has had some nausea and vomiting earlier today but no nausea now and he is still passing flatus.  He reported no focal weakness but reports generalized fatigue since the chemo.  No fevers or chills chest pain shortness of breath or swelling in his upper or lower extremities.     PAST MEDICAL HISTORY:  as per HPI  ALLERGIES:  as per HPI  MEDICATIONS:  as per HPI  FAMILY HISTORY: as per HPI  SURGICAL HISTORY: as per HPI  SOCIAL HISTORY: as per HPI     PHYSICAL EXAM:  VITAL SIGNS: Nursing notes reviewed.  GENERAL:  Alert and interactive  EYES:   Eyes track.  ENT:  Airway patent.  RESPIRATORY:  Nonlabored breathing.  Clear bilaterally  CARDIOVASCULAR:  [Regular rate.] [Regular rhythm.]  GASTROINTESTINAL: Distention with mild lower abdominal tenderness.  No rebound rigidity or guarding  MUSCULOSKELETAL:  No deformity.  No tenderness to the upper or lower extremities and radial pulses and dorsalis pedis pulses are equal bilaterally.  NEUROLOGICAL:  Awake.  Moves upper and lower extremities.  Sensation intact in upper and lower extremities.  SKIN:  Dry.        MEDICAL DECISION MAKING (MDM):     DIAGNOSTIC STUDIES  Labs: Blood cultures, CBC, CMP, lactate, Elysium, phosphorus, INR, troponin, UA  Radiology: Chest x-ray, CT abdomen pelvis with contrast     EKG  Per my interpretation:  Electrocardiogram ECG  RATE: 110  RHYTHM: [Sinus]  AXIS: [Normal]  INTERVALS: [Normal]  ST-T WAVE CHANGES: [Normal]  ABNORMALITIES/COMPARISON: Unchanged  from most recent EKG on December 10, 2023.  Poor baseline.    Critical Care Time  Authorized and Performed by: Demetri Oh MD  Total critical care time: [32] minutes  Due to a high probability of clinically significant, life threatening deterioration, the patient required my highest level of preparedness to intervene emergently and I personally spent this critical care time directly and personally managing the patient. This critical care time included obtaining a history; examining the patient; pulse oximetry; ordering and review of studies; arranging urgent treatment with development of a management plan; evaluation of patient's response to treatment; frequent reassessment; and, discussions with other providers and patient/family.  This critical care time was performed to assess and manage the high probability of imminent, life-threatening deterioration that could result in multi-organ failure. It was exclusive of separately billable procedures and treating other patients and teaching time.  Please see MDM section and the rest of the note for further information on patient assessment and treatment.       REVIEW OF OLD RECORDS: Reviewed the outpatient progress note from 5/13/2024     SUMMARY:  The patient is admitted to the Emergency Department for evaluation of above. Complete history and physical examination was performed by me.  Patient presents with generalized fatigue and inability urinate in the setting of starting chemo a few days ago.  He denies any chest pain or shortness of breath and is currently on anticoagulation so my suspicion for PE is low.  He was quite distended and having trouble urinating so we checked a postvoid residual was quite elevated so we placed a Forrest and I did get a CT of his abdomen pelvis.  We did obtain blood cultures and we monitored him closely for signs of infection.  Patient developed a fever while he was here so I did give him broad-spectrum antibiotics and shortly after he  got antibiotics his blood pressure seem to worsen his heart rate seem to increase pointing towards likely occult sepsis so I did treat him with 2 L of lactated Ringer's.  Patient did not have evidence on repeat evaluation of hypoperfusion and lactate was normal pointing away from septic shock.    Patient's blood work did not show leukocytosis or VIK or clinically significant electrolyte abnormality.  Patient's troponin was slightly elevated but the repeat was stable his EKG was not consistent with occlusive MI but he did have a lot of artifact.  Patient's chest x-ray showed the complete opacification of the left hemithorax but that is where his malignant mesothelioma is and I suspect that is what caused this.  Suspicion for an infected fluid collection is low.  Patient also does not have pulmonary symptoms to point towards this.  Patient was signed out to my colleague pending the results of the CT abdomen pelvis.  I anticipate the patient will be admitted to the hospital for further IV antibiotics and monitoring.     DIAGNOSIS:    Sepsis  Urinary retention     DISPOSITION:    1) handoff     Demetri Oh MD  05/17/24 0015

## 2024-05-17 NOTE — PROGRESS NOTES
Emergency Medicine Transition of Care Note.    I received Vinicius Arriola in signout from Dr. Oh.  Please see the previous ED provider note for all HPI, PE and MDM up to the time of signout at 0000. This is in addition to the primary record.    In brief Vinicius Arriola is an 88 y.o. male presenting for   Chief Complaint   Patient presents with    Weakness, Gen     Mesothelioma.  Last chemo on Monday.  Increased weakness since Monday.  No other c/o     At the time of signout we were awaiting: CT results/admission    Diagnoses as of 05/17/24 0325   Sepsis, due to unspecified organism, unspecified whether acute organ dysfunction present (Multi)   Acute urinary retention       Medical Decision Making  Patient signed out to me pending CT results and admission.  CT imaging concern for diverticulitis without perforation or abscess.  Given the patient has source of infection and is immunocompromise recommended admission.  Patient was given antibiotics by the previous ED provider as well as IV fluids with improvement of the patient's blood pressure.  Lactate level was normal.  Patient was advised of his laboratory and imaging findings and recommendations and he is in agreement with this.  Hospital service was contacted and case discussed the patient was accepted for admission.        Final diagnoses:   [A41.9] Sepsis, due to unspecified organism, unspecified whether acute organ dysfunction present (Multi)   [R33.8] Acute urinary retention     Labs Reviewed   CBC WITH AUTO DIFFERENTIAL - Abnormal       Result Value    WBC 11.2      nRBC 0.0      RBC 4.62      Hemoglobin 13.4 (*)     Hematocrit 40.9 (*)     MCV 89      MCH 29.0      MCHC 32.8      RDW 16.6 (*)     Platelets 197      Neutrophils % 91.3      Immature Granulocytes %, Automated 0.3      Lymphocytes % 5.1      Monocytes % 2.9      Eosinophils % 0.1      Basophils % 0.3      Neutrophils Absolute 10.20 (*)     Immature Granulocytes Absolute, Automated 0.03       Lymphocytes Absolute 0.57 (*)     Monocytes Absolute 0.32      Eosinophils Absolute 0.01      Basophils Absolute 0.03     COMPREHENSIVE METABOLIC PANEL - Abnormal    Glucose 97      Sodium 132 (*)     Potassium 4.5      Chloride 97 (*)     Bicarbonate 25      Anion Gap 15      Urea Nitrogen 29 (*)     Creatinine 1.34 (*)     eGFR 52 (*)     Calcium 9.4      Albumin 3.8      Alkaline Phosphatase 61      Total Protein 7.0      AST 17      Bilirubin, Total 0.5      ALT 9 (*)    PROTIME-INR - Abnormal    Protime 19.9 (*)     INR 1.8 (*)    SERIAL TROPONIN-INITIAL - Abnormal    Troponin I, High Sensitivity 23 (*)     Narrative:     Less than 99th percentile of normal range cutoff-  Female and children under 18 years old <14 ng/L; Male <21 ng/L: Negative  Repeat testing should be performed if clinically indicated.     Female and children under 18 years old 14-50 ng/L; Male 21-50 ng/L:  Consistent with possible cardiac damage and possible increased clinical   risk. Serial measurements may help to assess extent of myocardial damage.     >50 ng/L: Consistent with cardiac damage, increased clinical risk and  myocardial infarction. Serial measurements may help assess extent of   myocardial damage.      NOTE: Children less than 1 year old may have higher baseline troponin   levels and results should be interpreted in conjunction with the overall   clinical context.     NOTE: Troponin I testing is performed using a different   testing methodology at [facility] than at other   [facility] hospitals. Direct result comparisons should only   be made within the same method.   SERIAL TROPONIN, 1 HOUR - Abnormal    Troponin I, High Sensitivity 27 (*)     Narrative:     Less than 99th percentile of normal range cutoff-  Female and children under 18 years old <14 ng/L; Male <21 ng/L: Negative  Repeat testing should be performed if clinically indicated.     Female and children under 18 years old 14-50 ng/L; Male 21-50  ng/L:  Consistent with possible cardiac damage and possible increased clinical   risk. Serial measurements may help to assess extent of myocardial damage.     >50 ng/L: Consistent with cardiac damage, increased clinical risk and  myocardial infarction. Serial measurements may help assess extent of   myocardial damage.      NOTE: Children less than 1 year old may have higher baseline troponin   levels and results should be interpreted in conjunction with the overall   clinical context.     NOTE: Troponin I testing is performed using a different   testing methodology at [facility] than at other   [facility]. Direct result comparisons should only   be made within the same method.   BLOOD CULTURE - Normal    Blood Culture Loaded on Instrument - Culture in progress     BLOOD CULTURE - Normal    Blood Culture Loaded on Instrument - Culture in progress     LACTATE - Normal    Lactate 1.5      Narrative:     Venipuncture immediately after or during the administration of Metamizole may lead to falsely low results. Testing should be performed immediately  prior to Metamizole dosing.   PHOSPHORUS - Normal    Phosphorus 2.5     MAGNESIUM - Normal    Magnesium 1.87     URINALYSIS WITH REFLEX CULTURE AND MICROSCOPIC - Normal    Color, Urine Yellow      Appearance, Urine Clear      Specific Gravity, Urine 1.014      pH, Urine 5.0      Protein, Urine NEGATIVE      Glucose, Urine NEGATIVE      Blood, Urine NEGATIVE      Ketones, Urine NEGATIVE      Bilirubin, Urine NEGATIVE      Urobilinogen, Urine <2.0      Nitrite, Urine NEGATIVE      Leukocyte Esterase, Urine NEGATIVE     TROPONIN SERIES- (INITIAL, 1 HR)    Narrative:     The following orders were created for panel order Troponin Series, (0, 1 HR).  Procedure                               Abnormality         Status                     ---------                               -----------         ------                     Troponin I, High Sensiti...[[id number]]   Abnormal            Final result               Troponin, High Sensitivi...[[id number]]  Abnormal            Final result                 Please view results for these tests on the individual orders.   URINALYSIS WITH REFLEX CULTURE AND MICROSCOPIC    Narrative:     The following orders were created for panel order Urinalysis with Reflex Culture and Microscopic.  Procedure                               Abnormality         Status                     ---------                               -----------         ------                     Urinalysis with Reflex C...[[id number]]  Normal              Final result               Extra Urine Gray Tube[[id number]]                            In process                   Please view results for these tests on the individual orders.   EXTRA URINE GRAY TUBE     CT abdomen pelvis w IV contrast   Final Result   1.  Subtle inflammatory change along the proximal sigmoid colon which   may represent a low-grade acute diverticulitis.  No definite   perforation or abscess.   2.  Complete collapse and consolidation of the left lung with a   loculated effusion at the left lung base, unchanged compared to the   prior chest CT and likely consistent with patient's reported   mesothelioma, possibly chronic post treatment change.   3.  Cardiomegaly with chronic changes suggesting COPD.   4.  Cholelithiasis.   5.  Moderate bilateral renal atrophy with nonobstructing 6 mm left   renal calculus.   6.  Additional chronic changes as described.   Signed by Lucas Owen      XR chest 1 view   Final Result   Complete opacification left hemithorax likely related to volume loss   and chronic consolidation.   Signed by Andrade Angel MD              Procedure  Procedures    Regan Lugo DO

## 2024-05-18 ENCOUNTER — APPOINTMENT (OUTPATIENT)
Dept: RADIOLOGY | Facility: HOSPITAL | Age: 89
DRG: 871 | End: 2024-05-18
Payer: MEDICARE

## 2024-05-18 LAB
ANION GAP BLDV CALCULATED.4IONS-SCNC: 8 MMOL/L (ref 10–25)
ANION GAP SERPL CALC-SCNC: 11 MMOL/L (ref 10–20)
APPEARANCE UR: ABNORMAL
ATRIAL RATE: 110 BPM
BACTERIA #/AREA URNS AUTO: ABNORMAL /HPF
BASE EXCESS BLDV CALC-SCNC: 1.1 MMOL/L (ref -2–3)
BILIRUB UR STRIP.AUTO-MCNC: NEGATIVE MG/DL
BODY TEMPERATURE: ABNORMAL
BUN SERPL-MCNC: 22 MG/DL (ref 6–23)
CA-I BLDV-SCNC: 1.19 MMOL/L (ref 1.1–1.33)
CALCIUM SERPL-MCNC: 8.2 MG/DL (ref 8.6–10.3)
CHLORIDE BLDV-SCNC: 100 MMOL/L (ref 98–107)
CHLORIDE SERPL-SCNC: 99 MMOL/L (ref 98–107)
CO2 SERPL-SCNC: 25 MMOL/L (ref 21–32)
COLOR UR: YELLOW
CREAT SERPL-MCNC: 1.3 MG/DL (ref 0.5–1.3)
EGFRCR SERPLBLD CKD-EPI 2021: 54 ML/MIN/1.73M*2
ERYTHROCYTE [DISTWIDTH] IN BLOOD BY AUTOMATED COUNT: 16.5 % (ref 11.5–14.5)
GLUCOSE BLDV-MCNC: 90 MG/DL (ref 74–99)
GLUCOSE SERPL-MCNC: 94 MG/DL (ref 74–99)
GLUCOSE UR STRIP.AUTO-MCNC: NEGATIVE MG/DL
HCO3 BLDV-SCNC: 27.5 MMOL/L (ref 22–26)
HCT VFR BLD AUTO: 31.9 % (ref 41–52)
HCT VFR BLD EST: 32 % (ref 41–52)
HGB BLD-MCNC: 10.4 G/DL (ref 13.5–17.5)
HGB BLDV-MCNC: 10.6 G/DL (ref 13.5–17.5)
INHALED O2 CONCENTRATION: 21 %
INR PPP: 2.4 (ref 0.9–1.1)
KETONES UR STRIP.AUTO-MCNC: ABNORMAL MG/DL
LACTATE BLDV-SCNC: 1.2 MMOL/L (ref 0.4–2)
LEUKOCYTE ESTERASE UR QL STRIP.AUTO: ABNORMAL
MAGNESIUM SERPL-MCNC: 1.7 MG/DL (ref 1.6–2.4)
MCH RBC QN AUTO: 28.7 PG (ref 26–34)
MCHC RBC AUTO-ENTMCNC: 32.6 G/DL (ref 32–36)
MCV RBC AUTO: 88 FL (ref 80–100)
NITRITE UR QL STRIP.AUTO: NEGATIVE
NRBC BLD-RTO: 0 /100 WBCS (ref 0–0)
OXYHGB MFR BLDV: 30.3 % (ref 45–75)
P OFFSET: 165 MS
P ONSET: 116 MS
PCO2 BLDV: 51 MM HG (ref 41–51)
PH BLDV: 7.34 PH (ref 7.33–7.43)
PH UR STRIP.AUTO: 5 [PH]
PHOSPHATE SERPL-MCNC: 2.6 MG/DL (ref 2.5–4.9)
PLATELET # BLD AUTO: 143 X10*3/UL (ref 150–450)
PO2 BLDV: 20 MM HG (ref 35–45)
POTASSIUM BLDV-SCNC: 4.4 MMOL/L (ref 3.5–5.3)
POTASSIUM SERPL-SCNC: 3.6 MMOL/L (ref 3.5–5.3)
PR INTERVAL: 224 MS
PROCALCITONIN SERPL-MCNC: 0.33 NG/ML
PROT UR STRIP.AUTO-MCNC: ABNORMAL MG/DL
PROTHROMBIN TIME: 27.1 SECONDS (ref 9.8–12.8)
Q ONSET: 228 MS
QRS COUNT: 18 BEATS
QRS DURATION: 86 MS
QT INTERVAL: 418 MS
QTC CALCULATION(BAZETT): 565 MS
QTC FREDERICIA: 512 MS
R AXIS: 129 DEGREES
RBC # BLD AUTO: 3.63 X10*6/UL (ref 4.5–5.9)
RBC # UR STRIP.AUTO: ABNORMAL /UL
RBC #/AREA URNS AUTO: >20 /HPF
SAO2 % BLDV: 31 % (ref 45–75)
SODIUM BLDV-SCNC: 131 MMOL/L (ref 136–145)
SODIUM SERPL-SCNC: 131 MMOL/L (ref 136–145)
SP GR UR STRIP.AUTO: 1.05
STAPHYLOCOCCUS SPEC CULT: NORMAL
T AXIS: 180 DEGREES
T OFFSET: 437 MS
UROBILINOGEN UR STRIP.AUTO-MCNC: <2 MG/DL
VANCOMYCIN SERPL-MCNC: 19.4 UG/ML (ref 5–20)
VENTRICULAR RATE: 110 BPM
WBC # BLD AUTO: 9.5 X10*3/UL (ref 4.4–11.3)
WBC #/AREA URNS AUTO: ABNORMAL /HPF

## 2024-05-18 PROCEDURE — 2500000002 HC RX 250 W HCPCS SELF ADMINISTERED DRUGS (ALT 637 FOR MEDICARE OP, ALT 636 FOR OP/ED): Mod: MUE

## 2024-05-18 PROCEDURE — 81001 URINALYSIS AUTO W/SCOPE: CPT

## 2024-05-18 PROCEDURE — 2500000004 HC RX 250 GENERAL PHARMACY W/ HCPCS (ALT 636 FOR OP/ED)

## 2024-05-18 PROCEDURE — 2500000005 HC RX 250 GENERAL PHARMACY W/O HCPCS

## 2024-05-18 PROCEDURE — 87899 AGENT NOS ASSAY W/OPTIC: CPT | Mod: ELYLAB

## 2024-05-18 PROCEDURE — 87086 URINE CULTURE/COLONY COUNT: CPT | Mod: ELYLAB

## 2024-05-18 PROCEDURE — 83735 ASSAY OF MAGNESIUM: CPT

## 2024-05-18 PROCEDURE — 2500000001 HC RX 250 WO HCPCS SELF ADMINISTERED DRUGS (ALT 637 FOR MEDICARE OP)

## 2024-05-18 PROCEDURE — 94640 AIRWAY INHALATION TREATMENT: CPT

## 2024-05-18 PROCEDURE — 74177 CT ABD & PELVIS W/CONTRAST: CPT | Performed by: STUDENT IN AN ORGANIZED HEALTH CARE EDUCATION/TRAINING PROGRAM

## 2024-05-18 PROCEDURE — 84100 ASSAY OF PHOSPHORUS: CPT

## 2024-05-18 PROCEDURE — 2500000005 HC RX 250 GENERAL PHARMACY W/O HCPCS: Performed by: STUDENT IN AN ORGANIZED HEALTH CARE EDUCATION/TRAINING PROGRAM

## 2024-05-18 PROCEDURE — 84132 ASSAY OF SERUM POTASSIUM: CPT | Mod: 91

## 2024-05-18 PROCEDURE — 2500000006 HC RX 250 W HCPCS SELF ADMINISTERED DRUGS (ALT 637 FOR ALL PAYERS): Mod: MUE

## 2024-05-18 PROCEDURE — 71260 CT THORAX DX C+: CPT | Performed by: STUDENT IN AN ORGANIZED HEALTH CARE EDUCATION/TRAINING PROGRAM

## 2024-05-18 PROCEDURE — 87449 NOS EACH ORGANISM AG IA: CPT | Mod: ELYLAB

## 2024-05-18 PROCEDURE — 87205 SMEAR GRAM STAIN: CPT | Mod: ELYLAB | Performed by: STUDENT IN AN ORGANIZED HEALTH CARE EDUCATION/TRAINING PROGRAM

## 2024-05-18 PROCEDURE — 99291 CRITICAL CARE FIRST HOUR: CPT

## 2024-05-18 PROCEDURE — 2500000001 HC RX 250 WO HCPCS SELF ADMINISTERED DRUGS (ALT 637 FOR MEDICARE OP): Performed by: NURSE PRACTITIONER

## 2024-05-18 PROCEDURE — 2020000001 HC ICU ROOM DAILY

## 2024-05-18 PROCEDURE — 2550000001 HC RX 255 CONTRASTS: Performed by: STUDENT IN AN ORGANIZED HEALTH CARE EDUCATION/TRAINING PROGRAM

## 2024-05-18 PROCEDURE — 36415 COLL VENOUS BLD VENIPUNCTURE: CPT

## 2024-05-18 PROCEDURE — 2500000004 HC RX 250 GENERAL PHARMACY W/ HCPCS (ALT 636 FOR OP/ED): Performed by: INTERNAL MEDICINE

## 2024-05-18 PROCEDURE — 85610 PROTHROMBIN TIME: CPT

## 2024-05-18 PROCEDURE — 80202 ASSAY OF VANCOMYCIN: CPT

## 2024-05-18 PROCEDURE — 80048 BASIC METABOLIC PNL TOTAL CA: CPT

## 2024-05-18 PROCEDURE — 2500000004 HC RX 250 GENERAL PHARMACY W/ HCPCS (ALT 636 FOR OP/ED): Mod: JZ

## 2024-05-18 PROCEDURE — 74177 CT ABD & PELVIS W/CONTRAST: CPT

## 2024-05-18 PROCEDURE — 94640 AIRWAY INHALATION TREATMENT: CPT | Performed by: STUDENT IN AN ORGANIZED HEALTH CARE EDUCATION/TRAINING PROGRAM

## 2024-05-18 PROCEDURE — 2500000006 HC RX 250 W HCPCS SELF ADMINISTERED DRUGS (ALT 637 FOR ALL PAYERS)

## 2024-05-18 PROCEDURE — 85027 COMPLETE CBC AUTOMATED: CPT

## 2024-05-18 RX ORDER — SODIUM CHLORIDE FOR INHALATION 3 %
3 VIAL, NEBULIZER (ML) INHALATION
Status: COMPLETED | OUTPATIENT
Start: 2024-05-18 | End: 2024-05-18

## 2024-05-18 RX ORDER — IPRATROPIUM BROMIDE AND ALBUTEROL SULFATE 2.5; .5 MG/3ML; MG/3ML
3 SOLUTION RESPIRATORY (INHALATION)
Status: DISCONTINUED | OUTPATIENT
Start: 2024-05-18 | End: 2024-05-19 | Stop reason: HOSPADM

## 2024-05-18 RX ORDER — NYSTATIN 100000 [USP'U]/G
1 POWDER TOPICAL 2 TIMES DAILY
Status: DISCONTINUED | OUTPATIENT
Start: 2024-05-18 | End: 2024-05-19 | Stop reason: HOSPADM

## 2024-05-18 RX ORDER — MAGNESIUM SULFATE HEPTAHYDRATE 40 MG/ML
4 INJECTION, SOLUTION INTRAVENOUS ONCE
Status: COMPLETED | OUTPATIENT
Start: 2024-05-18 | End: 2024-05-18

## 2024-05-18 RX ORDER — POTASSIUM CHLORIDE 20 MEQ/1
40 TABLET, EXTENDED RELEASE ORAL ONCE
Status: COMPLETED | OUTPATIENT
Start: 2024-05-18 | End: 2024-05-18

## 2024-05-18 RX ADMIN — POTASSIUM CHLORIDE 40 MEQ: 1500 TABLET, EXTENDED RELEASE ORAL at 10:53

## 2024-05-18 RX ADMIN — ASPIRIN 81 MG: 81 TABLET, COATED ORAL at 20:34

## 2024-05-18 RX ADMIN — MAGNESIUM SULFATE HEPTAHYDRATE 4 G: 40 INJECTION, SOLUTION INTRAVENOUS at 10:53

## 2024-05-18 RX ADMIN — ACETAMINOPHEN 650 MG: 325 TABLET ORAL at 14:39

## 2024-05-18 RX ADMIN — SODIUM CHLORIDE SOLN NEBU 3% 3 ML: 3 NEBU SOLN at 15:29

## 2024-05-18 RX ADMIN — TRAZODONE HYDROCHLORIDE 100 MG: 50 TABLET ORAL at 20:34

## 2024-05-18 RX ADMIN — Medication 1000 MG: at 10:51

## 2024-05-18 RX ADMIN — SODIUM CHLORIDE SOLN NEBU 3% 3 ML: 3 NEBU SOLN at 20:03

## 2024-05-18 RX ADMIN — IOHEXOL 75 ML: 350 INJECTION, SOLUTION INTRAVENOUS at 08:39

## 2024-05-18 RX ADMIN — IPRATROPIUM BROMIDE AND ALBUTEROL SULFATE 3 ML: 2.5; .5 SOLUTION RESPIRATORY (INHALATION) at 20:03

## 2024-05-18 RX ADMIN — FERROUS SULFATE TAB 325 MG (65 MG ELEMENTAL FE) 1 TABLET: 325 (65 FE) TAB at 10:51

## 2024-05-18 RX ADMIN — METOPROLOL TARTRATE 25 MG: 25 TABLET, FILM COATED ORAL at 20:34

## 2024-05-18 RX ADMIN — AZITHROMYCIN MONOHYDRATE 500 MG: 500 INJECTION, POWDER, LYOPHILIZED, FOR SOLUTION INTRAVENOUS at 01:59

## 2024-05-18 RX ADMIN — IPRATROPIUM BROMIDE AND ALBUTEROL SULFATE 3 ML: 2.5; .5 SOLUTION RESPIRATORY (INHALATION) at 23:55

## 2024-05-18 RX ADMIN — SODIUM CHLORIDE 500 ML: 9 INJECTION, SOLUTION INTRAVENOUS at 05:37

## 2024-05-18 RX ADMIN — AZITHROMYCIN MONOHYDRATE 500 MG: 500 INJECTION, POWDER, LYOPHILIZED, FOR SOLUTION INTRAVENOUS at 23:33

## 2024-05-18 RX ADMIN — Medication 3 L/MIN: at 22:30

## 2024-05-18 RX ADMIN — VANCOMYCIN HYDROCHLORIDE 1250 MG: 1.25 INJECTION, POWDER, LYOPHILIZED, FOR SOLUTION INTRAVENOUS at 00:30

## 2024-05-18 RX ADMIN — ACETAMINOPHEN 650 MG: 325 TABLET ORAL at 23:59

## 2024-05-18 RX ADMIN — FERROUS SULFATE TAB 325 MG (65 MG ELEMENTAL FE) 1 TABLET: 325 (65 FE) TAB at 20:34

## 2024-05-18 RX ADMIN — IPRATROPIUM BROMIDE AND ALBUTEROL SULFATE 3 ML: 2.5; .5 SOLUTION RESPIRATORY (INHALATION) at 12:02

## 2024-05-18 RX ADMIN — SODIUM CHLORIDE SOLN NEBU 3% 3 ML: 3 NEBU SOLN at 23:55

## 2024-05-18 RX ADMIN — IPRATROPIUM BROMIDE AND ALBUTEROL SULFATE 3 ML: 2.5; .5 SOLUTION RESPIRATORY (INHALATION) at 15:26

## 2024-05-18 RX ADMIN — ACETAMINOPHEN 650 MG: 325 SOLUTION ORAL at 04:05

## 2024-05-18 RX ADMIN — ROSUVASTATIN CALCIUM 10 MG: 10 TABLET, FILM COATED ORAL at 20:34

## 2024-05-18 RX ADMIN — Medication 3 L/MIN: at 20:00

## 2024-05-18 RX ADMIN — SODIUM CHLORIDE, SODIUM LACTATE, POTASSIUM CHLORIDE, AND CALCIUM CHLORIDE 100 ML/HR: 600; 310; 30; 20 INJECTION, SOLUTION INTRAVENOUS at 14:50

## 2024-05-18 RX ADMIN — NYSTATIN 1 APPLICATION: 100000 POWDER TOPICAL at 23:33

## 2024-05-18 RX ADMIN — MEROPENEM 1 G: 1 INJECTION, POWDER, FOR SOLUTION INTRAVENOUS at 20:33

## 2024-05-18 RX ADMIN — PROMETHAZINE HYDROCHLORIDE AND DEXTROMETHORPHAN HYDROBROMIDE ORAL 1.25 ML: 15; 6.25 SOLUTION ORAL at 14:50

## 2024-05-18 RX ADMIN — Medication 3 L/MIN: at 18:15

## 2024-05-18 RX ADMIN — VANCOMYCIN HYDROCHLORIDE 1250 MG: 1.25 INJECTION, POWDER, LYOPHILIZED, FOR SOLUTION INTRAVENOUS at 22:21

## 2024-05-18 RX ADMIN — SODIUM CHLORIDE, POTASSIUM CHLORIDE, SODIUM LACTATE AND CALCIUM CHLORIDE 1500 ML: 600; 310; 30; 20 INJECTION, SOLUTION INTRAVENOUS at 08:09

## 2024-05-18 RX ADMIN — METOPROLOL TARTRATE 25 MG: 25 TABLET, FILM COATED ORAL at 10:51

## 2024-05-18 RX ADMIN — Medication 2 L/MIN: at 09:15

## 2024-05-18 RX ADMIN — MEROPENEM 1 G: 1 INJECTION, POWDER, FOR SOLUTION INTRAVENOUS at 10:51

## 2024-05-18 RX ADMIN — WARFARIN SODIUM 2 MG: 2 TABLET ORAL at 19:20

## 2024-05-18 ASSESSMENT — COGNITIVE AND FUNCTIONAL STATUS - GENERAL
MOVING TO AND FROM BED TO CHAIR: A LOT
WALKING IN HOSPITAL ROOM: A LOT
STANDING UP FROM CHAIR USING ARMS: A LOT
TURNING FROM BACK TO SIDE WHILE IN FLAT BAD: A LOT
CLIMB 3 TO 5 STEPS WITH RAILING: TOTAL
MOVING FROM LYING ON BACK TO SITTING ON SIDE OF FLAT BED WITH BEDRAILS: A LOT
MOBILITY SCORE: 11

## 2024-05-18 ASSESSMENT — ENCOUNTER SYMPTOMS
ENDOCRINE NEGATIVE: 1
COUGH: 1
FATIGUE: 0
NAUSEA: 0
CHILLS: 0
VOMITING: 0
ABDOMINAL DISTENTION: 1
ABDOMINAL PAIN: 1
MUSCULOSKELETAL NEGATIVE: 1
NEUROLOGICAL NEGATIVE: 1
EYES NEGATIVE: 1
CARDIOVASCULAR NEGATIVE: 1
FEVER: 0
DIAPHORESIS: 0
SHORTNESS OF BREATH: 1

## 2024-05-18 ASSESSMENT — PAIN SCALES - GENERAL
PAINLEVEL_OUTOF10: 0 - NO PAIN

## 2024-05-18 ASSESSMENT — PAIN - FUNCTIONAL ASSESSMENT
PAIN_FUNCTIONAL_ASSESSMENT: 0-10

## 2024-05-18 NOTE — NURSING NOTE
Patient appears to be possibly sundowning or experiencing delirium, patient asking to go back to his room and when asked AnO questions he stated the year was 2004. ZAKIA Galicia made aware, order for VBG to be drawn.

## 2024-05-18 NOTE — PROGRESS NOTES
Urology progress note for Saturday, 5/18/2024  Currently in MICU due to pulmonary status  Forrest catheter in place draining well  Will add Flomax and finasteride anticipating Forrest removal over the next few days as the patient improves and has more mobility  Serum creatinine slightly improved to 1.3  CT scan shows 6 mm calculus left kidney and chest collapsed left lung mesothelioma  PSA is elevated at 8.61, upon transfer out of ICU we will order a dedicated prostate ultrasound to assess further  Otherwise continue Forrest catheter drainage for now and supportive care    Additional medical and historical objective data reviewed and listed below    No current facility-administered medications on file prior to encounter.     Current Outpatient Medications on File Prior to Encounter   Medication Sig Dispense Refill    ascorbic acid (Vitamin C) 1,000 mg tablet Take 1 tablet (1,000 mg) by mouth once daily.      aspirin 81 mg EC tablet Take 1 tablet (81 mg) by mouth once daily at bedtime.      carboxymethylcellulose (Refresh Celluvisc) 1 % ophthalmic solution dropperette Administer 1 drop into affected eye(s) once daily in the morning.      ferrous sulfate 325 (65 Fe) MG EC tablet Take 65 mg by mouth 2 times a day with meals. Do not crush, chew, or split.      furosemide (Lasix) 20 mg tablet Take 1 tablet (20 mg) by mouth once daily.      melatonin 5 mg tablet Take 1 tablet (5 mg) by mouth as needed at bedtime.      metoprolol tartrate (Lopressor) 25 mg tablet Take 1 tablet (25 mg) by mouth 2 times a day.      omega-3 fatty acids-fish oil 360-1,200 mg capsule Take 1 capsule (1,200 mg) by mouth twice a day.      potassium chloride CR (Klor-Con) 10 mEq ER tablet Take 1 tablet (10 mEq) by mouth once daily. 90 tablet 3    promethazine-DM (Phenergan-DM) 6.25-15 mg/5 mL syrup Take 1.25 mL by mouth 4 times a day as needed for cough.      rosuvastatin (Crestor) 10 mg tablet Take 1 tablet (10 mg) by mouth once daily.      traZODone  (Desyrel) 50 mg tablet Take 2 tablets (100 mg) by mouth once daily at bedtime.      ZINC ORAL Take 1 tablet by mouth once daily.      ferrous sulfate, 325 mg ferrous sulfate, tablet TAKE 1 TABLET BY MOUTH TWICE A DAY WITH MEALS 180 tablet 3    nitroglycerin (Nitrostat) 0.4 mg SL tablet Place under the tongue every 5 minutes if needed for chest pain (up to 3 doses).      warfarin (Coumadin) 1 mg tablet Take 1-2 tablets daily or as directed per [facility] 90 tablet 1    warfarin (Coumadin) 2 mg tablet Take 1 tablet (2 mg) by mouth see administration instructions. Take 1 tablet daily or as directed per [facility] 90 tablet 0    warfarin (Coumadin) 4 mg tablet TAKE AS DIRECTED Per [facility] Department 90 tablet 0    [DISCONTINUED] ergocalciferol (Vitamin D-2) 1.25 MG (17474 UT) capsule Take 1 capsule (1,250 mcg) by mouth 1 (one) time per week.       Results for orders placed or performed during the hospital encounter of 05/16/24 (from the past 96 hour(s))   Urinalysis with Reflex Culture and Microscopic   Result Value Ref Range    Color, Urine Yellow Straw, Yellow    Appearance, Urine Clear Clear    Specific Gravity, Urine 1.014 1.005 - 1.035    pH, Urine 5.0 5.0, 5.5, 6.0, 6.5, 7.0, 7.5, 8.0    Protein, Urine NEGATIVE NEGATIVE mg/dL    Glucose, Urine NEGATIVE NEGATIVE mg/dL    Blood, Urine NEGATIVE NEGATIVE    Ketones, Urine NEGATIVE NEGATIVE mg/dL    Bilirubin, Urine NEGATIVE NEGATIVE    Urobilinogen, Urine <2.0 <2.0 mg/dL    Nitrite, Urine NEGATIVE NEGATIVE    Leukocyte Esterase, Urine NEGATIVE NEGATIVE   Extra Urine Gray Tube   Result Value Ref Range    Extra Tube Hold for add-ons.    Blood Culture    Specimen: Peripheral Venipuncture; Blood culture   Result Value Ref Range    Blood Culture No growth at 1 day    Blood Culture    Specimen: Peripheral Venipuncture; Blood culture   Result Value Ref Range    Blood Culture No growth at 1 day    CBC and Auto Differential   Result Value Ref Range     WBC 11.2 4.4 - 11.3 x10*3/uL    nRBC 0.0 0.0 - 0.0 /100 WBCs    RBC 4.62 4.50 - 5.90 x10*6/uL    Hemoglobin 13.4 (L) 13.5 - 17.5 g/dL    Hematocrit 40.9 (L) 41.0 - 52.0 %    MCV 89 80 - 100 fL    MCH 29.0 26.0 - 34.0 pg    MCHC 32.8 32.0 - 36.0 g/dL    RDW 16.6 (H) 11.5 - 14.5 %    Platelets 197 150 - 450 x10*3/uL    Neutrophils % 91.3 40.0 - 80.0 %    Immature Granulocytes %, Automated 0.3 0.0 - 0.9 %    Lymphocytes % 5.1 13.0 - 44.0 %    Monocytes % 2.9 2.0 - 10.0 %    Eosinophils % 0.1 0.0 - 6.0 %    Basophils % 0.3 0.0 - 2.0 %    Neutrophils Absolute 10.20 (H) 1.60 - 5.50 x10*3/uL    Immature Granulocytes Absolute, Automated 0.03 0.00 - 0.50 x10*3/uL    Lymphocytes Absolute 0.57 (L) 0.80 - 3.00 x10*3/uL    Monocytes Absolute 0.32 0.05 - 0.80 x10*3/uL    Eosinophils Absolute 0.01 0.00 - 0.40 x10*3/uL    Basophils Absolute 0.03 0.00 - 0.10 x10*3/uL   Comprehensive Metabolic Panel   Result Value Ref Range    Glucose 97 74 - 99 mg/dL    Sodium 132 (L) 136 - 145 mmol/L    Potassium 4.5 3.5 - 5.3 mmol/L    Chloride 97 (L) 98 - 107 mmol/L    Bicarbonate 25 21 - 32 mmol/L    Anion Gap 15 10 - 20 mmol/L    Urea Nitrogen 29 (H) 6 - 23 mg/dL    Creatinine 1.34 (H) 0.50 - 1.30 mg/dL    eGFR 52 (L) >60 mL/min/1.73m*2    Calcium 9.4 8.6 - 10.3 mg/dL    Albumin 3.8 3.4 - 5.0 g/dL    Alkaline Phosphatase 61 33 - 136 U/L    Total Protein 7.0 6.4 - 8.2 g/dL    AST 17 9 - 39 U/L    Bilirubin, Total 0.5 0.0 - 1.2 mg/dL    ALT 9 (L) 10 - 52 U/L   Lactate   Result Value Ref Range    Lactate 1.5 0.4 - 2.0 mmol/L   Protime-INR   Result Value Ref Range    Protime 19.9 (H) 9.8 - 12.8 seconds    INR 1.8 (H) 0.9 - 1.1   Phosphorus   Result Value Ref Range    Phosphorus 2.5 2.5 - 4.9 mg/dL   Magnesium   Result Value Ref Range    Magnesium 1.87 1.60 - 2.40 mg/dL   Troponin I, High Sensitivity, Initial   Result Value Ref Range    Troponin I, High Sensitivity 23 (H) 0 - 20 ng/L   ECG 12 lead   Result Value Ref Range    Ventricular Rate 110  BPM    Atrial Rate 110 BPM    MT Interval 224 ms    QRS Duration 86 ms    QT Interval 418 ms    QTC Calculation(Bazett) 565 ms    R Axis 129 degrees    T Axis 180 degrees    QRS Count 18 beats    Q Onset 228 ms    P Onset 116 ms    P Offset 165 ms    T Offset 437 ms    QTC Fredericia 512 ms   Troponin, High Sensitivity, 1 Hour   Result Value Ref Range    Troponin I, High Sensitivity 27 (H) 0 - 20 ng/L   Lavender Top   Result Value Ref Range    Extra Tube Hold for add-ons.    SST TOP   Result Value Ref Range    Extra Tube Hold for add-ons.    CBC   Result Value Ref Range    WBC 14.4 (H) 4.4 - 11.3 x10*3/uL    nRBC 0.0 0.0 - 0.0 /100 WBCs    RBC 4.59 4.50 - 5.90 x10*6/uL    Hemoglobin 13.2 (L) 13.5 - 17.5 g/dL    Hematocrit 41.1 41.0 - 52.0 %    MCV 90 80 - 100 fL    MCH 28.8 26.0 - 34.0 pg    MCHC 32.1 32.0 - 36.0 g/dL    RDW 16.8 (H) 11.5 - 14.5 %    Platelets 192 150 - 450 x10*3/uL   Vancomycin   Result Value Ref Range    Vancomycin 16.1 5.0 - 20.0 ug/mL   Basic metabolic panel   Result Value Ref Range    Glucose 100 (H) 74 - 99 mg/dL    Sodium 134 (L) 136 - 145 mmol/L    Potassium 4.1 3.5 - 5.3 mmol/L    Chloride 95 (L) 98 - 107 mmol/L    Bicarbonate 24 21 - 32 mmol/L    Anion Gap 19 10 - 20 mmol/L    Urea Nitrogen 27 (H) 6 - 23 mg/dL    Creatinine 1.42 (H) 0.50 - 1.30 mg/dL    eGFR 48 (L) >60 mL/min/1.73m*2    Calcium 9.1 8.6 - 10.3 mg/dL   Protime-INR   Result Value Ref Range    Protime 22.5 (H) 9.8 - 12.8 seconds    INR 2.0 (H) 0.9 - 1.1   SST TOP   Result Value Ref Range    Extra Tube Hold for add-ons.    Prostate Specific Antigen   Result Value Ref Range    Prostate Specific AG 8.61 (H) <=4.00 ng/mL   POCT GLUCOSE   Result Value Ref Range    POCT Glucose 111 (H) 74 - 99 mg/dL   Comprehensive Metabolic Panel   Result Value Ref Range    Glucose 108 (H) 74 - 99 mg/dL    Sodium 134 (L) 136 - 145 mmol/L    Potassium 4.2 3.5 - 5.3 mmol/L    Chloride 96 (L) 98 - 107 mmol/L    Bicarbonate 28 21 - 32 mmol/L    Anion  Gap 14 10 - 20 mmol/L    Urea Nitrogen 25 (H) 6 - 23 mg/dL    Creatinine 1.46 (H) 0.50 - 1.30 mg/dL    eGFR 47 (L) >60 mL/min/1.73m*2    Calcium 9.1 8.6 - 10.3 mg/dL    Albumin 3.6 3.4 - 5.0 g/dL    Alkaline Phosphatase 60 33 - 136 U/L    Total Protein 6.8 6.4 - 8.2 g/dL    AST 17 9 - 39 U/L    Bilirubin, Total 0.7 0.0 - 1.2 mg/dL    ALT 12 10 - 52 U/L   Ammonia   Result Value Ref Range    Ammonia 13 (L) 16 - 53 umol/L   TSH with reflex to Free T4 if abnormal   Result Value Ref Range    Thyroid Stimulating Hormone 4.62 (H) 0.44 - 3.98 mIU/L   Light Blue Top   Result Value Ref Range    Extra Tube Hold for add-ons.    Lavender Top   Result Value Ref Range    Extra Tube Hold for add-ons.    SST TOP   Result Value Ref Range    Extra Tube Hold for add-ons.    Blood Bank Hold Tube   Result Value Ref Range    Extra Tube Hold for add-ons.    Gray Top   Result Value Ref Range    Extra Tube Hold for add-ons.    Thyroxine, Free   Result Value Ref Range    Thyroxine, Free 1.18 (H) 0.61 - 1.12 ng/dL   Lipase   Result Value Ref Range    Lipase 7 (L) 9 - 82 U/L   Creatine Kinase   Result Value Ref Range    Creatine Kinase 91 0 - 325 U/L   Procalcitonin   Result Value Ref Range    Procalcitonin 0.33 (H) <=0.07 ng/mL   Sedimentation rate, automated   Result Value Ref Range    Sedimentation Rate 49 (H) 0 - 20 mm/h   C-reactive protein   Result Value Ref Range    C-Reactive Protein 30.46 (H) <1.00 mg/dL   Lactate   Result Value Ref Range    Lactate 2.0 0.4 - 2.0 mmol/L   Sars-CoV-2 PCR   Result Value Ref Range    Coronavirus 2019, PCR Not Detected Not Detected   Blood Culture    Specimen: Peripheral Venipuncture; Blood culture   Result Value Ref Range    Blood Culture Loaded on Instrument - Culture in progress    Vancomycin   Result Value Ref Range    Vancomycin 19.4 5.0 - 20.0 ug/mL   CBC   Result Value Ref Range    WBC 9.5 4.4 - 11.3 x10*3/uL    nRBC 0.0 0.0 - 0.0 /100 WBCs    RBC 3.63 (L) 4.50 - 5.90 x10*6/uL    Hemoglobin 10.4 (L)  13.5 - 17.5 g/dL    Hematocrit 31.9 (L) 41.0 - 52.0 %    MCV 88 80 - 100 fL    MCH 28.7 26.0 - 34.0 pg    MCHC 32.6 32.0 - 36.0 g/dL    RDW 16.5 (H) 11.5 - 14.5 %    Platelets 143 (L) 150 - 450 x10*3/uL   Basic metabolic panel   Result Value Ref Range    Glucose 94 74 - 99 mg/dL    Sodium 131 (L) 136 - 145 mmol/L    Potassium 3.6 3.5 - 5.3 mmol/L    Chloride 99 98 - 107 mmol/L    Bicarbonate 25 21 - 32 mmol/L    Anion Gap 11 10 - 20 mmol/L    Urea Nitrogen 22 6 - 23 mg/dL    Creatinine 1.30 0.50 - 1.30 mg/dL    eGFR 54 (L) >60 mL/min/1.73m*2    Calcium 8.2 (L) 8.6 - 10.3 mg/dL   Magnesium   Result Value Ref Range    Magnesium 1.70 1.60 - 2.40 mg/dL   Phosphorus   Result Value Ref Range    Phosphorus 2.6 2.5 - 4.9 mg/dL   Protime-INR   Result Value Ref Range    Protime 27.1 (H) 9.8 - 12.8 seconds    INR 2.4 (H) 0.9 - 1.1     ECG 12 lead    Result Date: 5/18/2024  Sinus tachycardia with 1st degree AV block Lateral infarct , age undetermined Inferior infarct (cited on or before 10-DEC-2023) Prolonged QT Abnormal ECG When compared with ECG of 10-DEC-2023 11:23, Significant changes have occurred See ED provider note for full interpretation and clinical correlation Confirmed by Renee Dotson ([id number]) on 5/18/2024 11:02:23 AM    CT chest abdomen pelvis w IV contrast    Result Date: 5/18/2024  Interpreted By:  Collins Conde, STUDY: CT CHEST ABDOMEN PELVIS W IV CONTRAST;  5/18/2024 8:38 am   INDICATION: Signs/Symptoms:abd pain, LLQ TTP.   COMPARISON: Chest CT scan 12/10/2020. Chest and abdomen CT scan dated 06/14/2023.   ACCESSION NUMBER(S): [id number]   ORDERING CLINICIAN: THEA MURRAY   TECHNIQUE: CT of the chest, abdomen, and pelvis was performed.  Contiguous axial images were obtained at 3 mm slice thickness through the chest, abdomen and pelvis. Coronal and sagittal reconstructions at 3 mm slice thickness were performed. 75 ml of contrast Omnipaque were administered intravenously without immediate  complication.   FINDINGS: CHEST:   LUNG/PLEURA/LARGE AIRWAYS: Significant collapse of the left lung with heterogenous appearance, pleural thickening and small loculated pleural fluid measuring 7.8 cm consistent with the patient's known mesothelioma. Focus of left posterior chest wall extension (series 201, image 77 and image 89) at the level of the 9th 10th and 10th 11th rib spaces   Right lower lobe ill-defined patchy infiltrate measuring 2.3 x 1.4 cm. Trace right-sided pleural effusion with surrounding atelectasis.   Redemonstrated left hilar ill-defined enhancing fullness appears extending to the left pulmonary trunk possibly tumor thrombus and felt less likely bland thrombus.   VESSELS: The aforementioned left hilar ill-defined soft tissue fullness appears extending to the left pulmonary trunk with almost 50% occlusion. Mild coronary artery calcifications   HEART: The heart is normal in size.  Trace pericardial effusion. Right chest wall pacemaker noted.   MEDIASTINUM AND ANDREA: Multiple prominent mediastinal lymph nodes in the largest in the subcarinal region measuring 1.6 cm. The esophagus is normal.   CHEST WALL AND LOWER NECK: The soft tissues of the chest wall demonstrate no gross abnormality. The visualized thyroid gland appears within normal limits.   ABDOMEN:   LIVER: Normal size. Normal contour. Stable hypoattenuating lesions likely benign in the largest segment 4A measuring 2 cm.   BILE DUCTS: The intrahepatic and extrahepatic ducts are not dilated.   GALLBLADDER: Cholelithiasis.   PANCREAS: The pancreas appears unremarkable without evidence of ductal dilatation or masses.   SPLEEN: The spleen is normal in size.   ADRENAL GLANDS: Bilateral adrenal glands appear normal.   KIDNEYS AND URETERS: The kidneys are normal in size and enhance symmetrically.  No hydroureteronephrosis. Nonobstructive left midpole 0.6 cm calculus.   PELVIS:   BLADDER: Underdistended with Forrest catheter in place.   REPRODUCTIVE  ORGANS: No pelvic masses.   BOWEL: The stomach, small and large bowel are normal in appearance without wall thickening or obstruction. Proximal sigmoid diverticulosis with mild wall thickening could be related to episodes of underlying mild diverticulitis. No ascites. Pneumoperitoneum.     VESSELS: Mild vascular calcifications and atherosclerotic changes.   PERITONEUM/RETROPERITONEUM/LYMPH NODES: No enlarged intra abdominopelvic lymphadenopathy.   BONE AND SOFT TISSUE: Multilevel degenerative spine changes and facet joint disease. Trace soft tissue thickening at the level of the umbilicus.       CHEST: 1. Significant collapse of the left lung with heterogenous appearance, pleural thickening and small loculated pleural fluid measuring 7.8 cm consistent with the patient's known mesothelioma which have worsened from the prior CT scan dated 06/14/2023 and grossly unchanged from 12/10/2023 unenhanced CT scan allowing for differences in techniques. Focus of new left posterior chest wall invasion noted at the level of 9th 10th and 10th 11th rib spaces. 2. Right lower lobe ill-defined patchy infiltrate measuring 2.3 x 1.4 cm. Trace right-sided pleural effusion with surrounding atelectasis. 3. Redemonstrated left hilar ill-defined enhancing fullness appears extending to the left pulmonary trunk likely tumor thrombus and felt less likely bland thrombus which has significantly worsened from 06/14/2023 CT scan. 4. Mildly enlarged multiple mediastinal lymph nodes in the largest in the subcarinal region measuring 1.6 cm, grossly unchanged from prior.   ABDOMEN-PELVIS: 1. Proximal sigmoid diverticulosis with mild wall thickening could be related to episodes of underlying mild uncomplicated diverticulitis 2. Other ancillary findings are unchanged.   The significant results of the study were relayed by me to and acknowledged by Janes Nichols NP on 05/18/2024 at 10:40 a.m. over the phone.   MACRO: None   Signed by: Collins Conde  5/18/2024 10:47 AM Dictation workstation:   [id number]    US renal complete    Result Date: 5/17/2024  Interpreted By:  Karel Lowry, STUDY: US RENAL COMPLETE;  5/17/2024 8:02 pm   INDICATION: Signs/Symptoms:Urinary retention?.   COMPARISON: CT abdomen pelvis 05/16/2024   ACCESSION NUMBER(S): [id number]   ORDERING CLINICIAN: KEMI AUGUSTINE   TECHNIQUE: Grayscale and color Doppler sonographic images of the kidneys.   FINDINGS:     RIGHT KIDNEY: The right kidney measures 11.3 cm in length. Diffuse renal cortical thinning. No hydronephrosis. No renal calculus. No perinephric fluid.   LEFT KIDNEY: The left kidney measures 11.2 cm in length. Diffuse renal cortical thinning. No hydronephrosis. There is a 1 cm nonobstructing left renal calculus. No perinephric fluid.   URINARY BLADDER: Urinary bladder is decompressed, limiting its evaluation, with Forrest catheter in place.       1. Diffuse bilateral renal cortical thinning in keeping with chronic renal disease. No hydronephrosis. 2. Nonobstructing 1 cm left renal calculus. 3. Decompressed urinary bladder due to Forrest catheter, limiting its evaluation.   MACRO: None.   Signed by: Karel Lowry 5/17/2024 10:07 PM Dictation workstation:   [id number]    CT abdomen pelvis w IV contrast    Result Date: 5/17/2024  STUDY: CT Abdomen and Pelvis with IV Contrast; 05/16/2024 10:27 pm INDICATION: Abdominal pain.  Distension. COMPARISON: CT CAP 06/14/2023 and 03/21/2023. ACCESSION NUMBER(S): [id number] ORDERING CLINICIAN: KAILA HALEY TECHNIQUE: CT of the abdomen and pelvis was performed.  Contiguous axial images were obtained at 3 mm slice thickness through the abdomen and pelvis. Coronal and sagittal reconstructions at 3 mm slice thickness were performed.  Omnipaque 350, 75 mL was administered intravenously.  FINDINGS: LOWER CHEST: Cardiac size is enlarged with pacer leads in the right atrium and right ventricle.  Moderate calcified coronary plaque is noted.  Mild  calcified plaque is seen in the included descending thoracic aorta. Thoracic aorta is not aneurysmal.  There is complete collapse and consolidation of the left lung at the left lung base with loculated effusion seen at the left lung base.  Increased AP diameter of the thorax suggests chronic changes of COPD.  Respiratory motion limits assessment in the lung bases and upper abdomen.  Small sliding-type hiatal hernia is noted.  ABDOMEN:  LIVER: Simple fluid density cysts are noted in the dome of the liver along with subcentimeter hypodensities in the liver which are too small to definitively characterize but statistically most likely represent simple cysts or hemangiomas.  No hepatomegaly.   BILE DUCTS: No intrahepatic or extrahepatic biliary ductal dilatation.  GALLBLADDER: Gallbladder contains gallstones in the gallbladder neck but is otherwise unremarkable.  STOMACH: No abnormalities identified.  PANCREAS: No masses or ductal dilatation.  SPLEEN: No splenomegaly or focal splenic lesion.  ADRENAL GLANDS: No thickening or nodules.  KIDNEYS AND URETERS: Kidneys are normal in location.  Moderate bilateral renal cortical thinning is seen with mild bilateral perinephric stranding. Nonobstructing 6 mm calculus is seen in the mid left kidney.  No ureteral calculi.  PELVIS:  BLADDER: Urinary bladder is decompressed by Forrest catheter, limiting assessment.   REPRODUCTIVE ORGANS: No abnormalities identified.  BOWEL: Diverticulosis is noted throughout the descending colon and sigmoid colon.  There is subtle fat stranding adjacent to the proximal sigmoid colon in the left upper pelvis which may represent mild acute diverticulitis.  Appendix is not definitively identified.  Terminal ileum is unremarkable.   VESSELS: Mild calcified atheromatous plaque is seen in the abdominal aorta and iliac arteries.  No aneurysm.  PERITONEUM/RETROPERITONEUM/LYMPH NODES: No free fluid.  No pneumoperitoneum. No lymphadenopathy.  ABDOMINAL WALL: No  abnormalities identified. SOFT TISSUES: No abnormalities identified.  BONES: No acute fracture or aggressive osseous lesion.  Generalized osseous demineralization is noted.  There is a mild levoconvex curvature of the lumbar spine centered at L4.  Moderate multilevel disc disease is seen in the spine.  There is moderate bilateral mid and lower lumbar facet arthrosis.    1.  Subtle inflammatory change along the proximal sigmoid colon which may represent a low-grade acute diverticulitis.  No definite perforation or abscess. 2.  Complete collapse and consolidation of the left lung with a loculated effusion at the left lung base, unchanged compared to the prior chest CT and likely consistent with patient's reported mesothelioma, possibly chronic post treatment change. 3.  Cardiomegaly with chronic changes suggesting COPD. 4.  Cholelithiasis. 5.  Moderate bilateral renal atrophy with nonobstructing 6 mm left renal calculus. 6.  Additional chronic changes as described. Signed by Lucas Owen    XR chest 1 view    Result Date: 5/16/2024  STUDY: Chest Radiograph;  5/16/2024 9:40 PM INDICATION: Weakness. COMPARISON: 12/10/2023 XR Chest. ACCESSION NUMBER(S): [id number] ORDERING CLINICIAN: KAILA HALEY TECHNIQUE:  Frontal chest was obtained at 21:40 hours. FINDINGS: CARDIOMEDIASTINAL SILHOUETTE: Cardiomediastinal silhouette is normal in size and configuration.  LUNGS: Complete opacification left hemithorax present.  This appears chronic likely related to volume loss and chronic consolidation.  This appears similar prior exams.  ABDOMEN: No remarkable upper abdominal findings.  BONES: No acute osseous changes.    Complete opacification left hemithorax likely related to volume loss and chronic consolidation. Signed by Andrade Angel MD

## 2024-05-18 NOTE — PROGRESS NOTES
"Vancomycin Dosing by Pharmacy- FOLLOW UP    Vinicius Arriola is a 88 y.o. year old male who Pharmacy has been consulted for vancomycin dosing for other possible sepsis . Based on the patient's indication and renal status this patient is being dosed based on a goal AUC of 500-600.     Renal function is currently stable.    Current vancomycin dose: 1250 mg given every 24 hours    Estimated vancomycin AUC on current dose: 464 mg/L.hr     Visit Vitals  BP (!) 88/47 Comment: informed Dr Sotelo; Dr Sotelo to reach out to ICU   Pulse 97   Temp 37.8 °C (100 °F)   Resp 22        Lab Results   Component Value Date    CREATININE 1.30 05/18/2024    CREATININE 1.46 (H) 05/17/2024    CREATININE 1.42 (H) 05/17/2024    CREATININE 1.34 (H) 05/16/2024        Patient weight is No results found for: \"PTWEIGHT\"    No results found for: \"CULTURE\"     I/O last 3 completed shifts:  In: 2249 (20.4 mL/kg) [P.O.:240; I.V.:1000 (9.1 mL/kg); IV Piggyback:1009]  Out: 1800 (16.3 mL/kg) [Urine:1800 (0.5 mL/kg/hr)]  Weight: 110.4 kg   [unfilled]    Lab Results   Component Value Date    PATIENTTEMP 37.0 02/17/2023        Assessment/Plan    Within goal AUC range. Continue current vancomycin regimen.    This dosing regimen is predicted by [employer] to result in the following pharmacokinetic parameters:  Regimen: 1250 mg IV every 24 hours.  Start time: 00:30 on 05/19/2024  Exposure target: AUC24 (range)400-600 mg/L.hr   AUC24,ss: 464 mg/L.hr  Probability of AUC24 > 400: 75 %  Ctrough,ss: 13.9 mg/L  Probability of Ctrough,ss > 20: 13 %  Probability of nephrotoxicity (Lodise PRAFUL 2009): 9 %      The next level will be obtained on 05/20/24 at 0500. May be obtained sooner if clinically indicated.   Will continue to monitor renal function daily while on vancomycin and order serum creatinine at least every 48 hours if not already ordered.  Follow for continued vancomycin needs, clinical response, and signs/symptoms of toxicity.       Yadira Delatorre, PharmD "

## 2024-05-18 NOTE — CARE PLAN
The patient's goals for the shift include      The clinical goals for the shift include pt BP will remain WNL    Pt latest BP 98/48; Dr Sotelo informed. Pt has had cough through the night; cough syrup x1 given. Pt has new O2 requirement of 2L; latest O2 sat 92% on 2L. Pt does not appear in distress; will continue to monitor and support.    Problem: Pain  Goal: My pain/discomfort is manageable  Outcome: Progressing     Problem: Safety  Goal: Patient will be injury free during hospitalization  Outcome: Progressing  Goal: I will remain free of falls  Outcome: Progressing     Problem: Respiratory  Goal: No signs of respiratory distress (eg. Use of accessory muscles. Peds grunting)  Outcome: Progressing  Goal: Wean oxygen to maintain O2 saturation per order/standard this shift  Outcome: Progressing

## 2024-05-18 NOTE — PROGRESS NOTES
Occupational Therapy                 Therapy Communication Note    Patient Name: Vinicius Arriola  MRN: [id number]  Today's Date: 5/18/2024     Discipline: Occupational Therapy    Missed Visit Reason: Missed Visit Reason:  (Pt being transferred to MICU, will reattempt as appropriate.)    Missed Time: Attempt

## 2024-05-18 NOTE — NURSING NOTE
Patient's wife at bedside, updated on patient's pending transfer to MICU 12.  Wife states she will take all of patient's belongings home with her.  Belongings bagged up and sent with wife.  Patient taken to ct then transferred to MICU 12.  Report given at bedside to MICU nurse.

## 2024-05-18 NOTE — CONSULTS
Consults        Primary MD: Hardik Velásquez MD    Reason For Consult      History Of Present Illness  Vinicius Arriola is a 88 y.o. male   History of mesothelioma CT shows known left lung collapse     Nausea vomiting receiving chemotherapy 3 days prior to admit  Complaining of fatigue    Difficulty urinating    Was found to be hypotensive tachycardic  Fevers 38 1  White count 14,400 and  COVID-19 negative  CRP 30 sed rate 49 procalcitonin 1.33    History of BPH  Urinary retention Forrest placed  No dysuria no hematuria no flank pain no fevers chills    Blood cultures from 5/16/2024 and 5/17/2024 showed no growth      Patient initially on vancomycin and Zosyn switched to vancomycin meropenem azithromycin   CT scan showing some minimal inflammation possible diverticulitis    CT scan chest showing right lower lobe infiltrate  Left lung collapse pleural thickening    Fevers persistent 39.8        Past Medical History  He has a past medical history of Acute embolism and thrombosis of unspecified deep veins of unspecified lower extremity (Multi), Dental disease, Encounter for preprocedural cardiovascular examination (11/02/2021), Obesity, unspecified (07/15/2022), Personal history of diseases of the blood and blood-forming organs and certain disorders involving the immune mechanism, Personal history of other diseases of male genital organs, Personal history of other diseases of the circulatory system, Personal history of other diseases of the circulatory system, Personal history of other diseases of the circulatory system (10/21/2021), Personal history of other diseases of the circulatory system, Personal history of other diseases of the nervous system and sense organs, Personal history of other malignant neoplasm of skin, Personal history of other specified conditions, and Personal history of other venous thrombosis and embolism.    Surgical History  He has a past surgical history that includes Other surgical history  (08/20/2019); Other surgical history (08/20/2019); Other surgical history (08/20/2019); Other surgical history (06/10/2022); Other surgical history (11/02/2021); Other surgical history (01/02/2020); Other surgical history (01/02/2020); Other surgical history (05/13/2022); Other surgical history (05/26/2022); Other surgical history (10/21/2021); Other surgical history (11/02/2021); Other surgical history (11/02/2021); Other surgical history (11/02/2021); Other surgical history (11/02/2021); and Other surgical history (11/04/2021).     Social History  He reports that he quit smoking about 57 years ago. His smoking use included cigarettes. He started smoking about 67 years ago. He has a 10 pack-year smoking history. He has never used smokeless tobacco. He reports current alcohol use of about 4.0 standard drinks of alcohol per week. He reports that he does not use drugs.    Family History  Family History   Problem Relation Name Age of Onset    Accidental death Mother      Coronary artery disease Mother      Other (Cardiac disorder) Father      Dementia Father      Cancer Father      Colon cancer Daughter       Allergies  Benadryl allergy decongestant, Diphenhydramine, and Diphenhydramine hcl     Immunization History   Administered Date(s) Administered    Flu vaccine, quadrivalent, high-dose, preservative free, age 65y+ (FLUZONE) 09/30/2020, 10/28/2021, 11/02/2022    Influenza Nasal, Unspecified 10/27/2021    Influenza, High Dose Seasonal, Preservative Free 10/17/2015, 10/04/2017, 10/19/2018, 09/09/2019    Influenza, Seasonal, Quadrivalent, Adjuvanted 10/10/2023    Influenza, Unspecified 09/01/2013, 10/01/2014, 10/01/2015, 10/01/2016, 11/01/2017, 11/01/2018, 10/01/2019, 10/01/2020, 10/27/2021    PPD Test 01/19/2014    Pfizer Purple Cap SARS-CoV-2 02/02/2021, 02/23/2021, 12/10/2021    Pneumococcal polysaccharide vaccine, 23-valent, age 2 years and older (PNEUMOVAX 23) 01/01/2012, 11/14/2018, 02/21/2022    Tetanus toxoid,  adsorbed 06/13/2001    Zoster vaccine, recombinant, adult (SHINGRIX) 03/26/2019, 06/01/2019    Zoster, live 01/01/2008, 04/01/2019     Review of Systems   Constitutional:  Negative for chills, diaphoresis, fatigue and fever.   HENT: Negative.     Eyes: Negative.    Respiratory:  Positive for cough and shortness of breath.    Cardiovascular: Negative.    Gastrointestinal:  Positive for abdominal distention and abdominal pain. Negative for nausea and vomiting.   Endocrine: Negative.    Genitourinary: Negative.    Musculoskeletal: Negative.    Neurological: Negative.         Physical Exam  Constitutional:       Appearance: He is not ill-appearing or diaphoretic.   Eyes:      Extraocular Movements: Extraocular movements intact.      Pupils: Pupils are equal, round, and reactive to light.   Neck:      Vascular: No carotid bruit.   Cardiovascular:      Heart sounds: Normal heart sounds. No murmur heard.  Pulmonary:      Effort: Pulmonary effort is normal. No respiratory distress.      Breath sounds: Rhonchi present. No wheezing or rales.   Abdominal:      General: Abdomen is flat. Bowel sounds are normal. There is distension.      Palpations: Abdomen is soft. There is no mass.      Tenderness: There is abdominal tenderness. There is no rebound.      Hernia: No hernia is present.   Musculoskeletal:         General: No swelling, tenderness, deformity or signs of injury.      Cervical back: Normal range of motion and neck supple. No rigidity or tenderness.      Right lower leg: No edema.      Left lower leg: No edema.   Lymphadenopathy:      Cervical: No cervical adenopathy.   Skin:     Findings: No erythema.          Range of Vitals (last 24 hours)  Heart Rate:  [70-98]   Temp:  [37 °C (98.6 °F)-39.8 °C (103.6 °F)]   Resp:  [20-35]   BP: ()/(47-74)   SpO2:  [87 %-99 %]     Relevant Results  Results from last 72 hours   Lab Units 05/18/24  0549 05/17/24  0901 05/16/24  2113   WBC AUTO x10*3/uL 9.5   < > 11.2  "  HEMOGLOBIN g/dL 10.4*   < > 13.4*   HEMATOCRIT % 31.9*   < > 40.9*   PLATELETS AUTO x10*3/uL 143*   < > 197   NEUTROS PCT AUTO %  --   --  91.3   LYMPHS PCT AUTO %  --   --  5.1   MONOS PCT AUTO %  --   --  2.9   EOS PCT AUTO %  --   --  0.1    < > = values in this interval not displayed.     Results from last 72 hours   Lab Units 05/18/24  0549   SODIUM mmol/L 131*   POTASSIUM mmol/L 3.6   CHLORIDE mmol/L 99   CO2 mmol/L 25   BUN mg/dL 22   CREATININE mg/dL 1.30   GLUCOSE mg/dL 94   CALCIUM mg/dL 8.2*   ANION GAP mmol/L 11   EGFR mL/min/1.73m*2 54*     Results from last 72 hours   Lab Units 05/17/24  1915   ALK PHOS U/L 60   BILIRUBIN TOTAL mg/dL 0.7   PROTEIN TOTAL g/dL 6.8   ALT U/L 12   AST U/L 17   ALBUMIN g/dL 3.6     Estimated Creatinine Clearance: 49 mL/min (by C-G formula based on SCr of 1.3 mg/dL).  C-Reactive Protein   Date/Time Value Ref Range Status   05/17/2024 07:15 PM 30.46 (H) <1.00 mg/dL Final     Sedimentation Rate   Date/Time Value Ref Range Status   05/17/2024 07:15 PM 49 (H) 0 - 20 mm/h Final     No results found for: \"HIV1X2\", \"HIVCONF\", \"LJCNWU7AW\"  No results found for: \"HCVPCRQUANT\"  Cultures  No results found for the last 14 days.    CT abdomen pelvis w IV contrast  Status: Final result     PACS Images     Show images for CT abdomen pelvis w IV contrast  Signed by    Signed Time Phone Pager   Lucas Owen MD 5/17/2024 00:16 [phone]      Exam Information    Status Exam Begun Exam Ended   Final 5/16/2024 22:07 5/16/2024 22:27     Study Result    Narrative & Impression   STUDY:  CT Abdomen and Pelvis with IV Contrast; 05/16/2024 10:27 pm  INDICATION:  Abdominal pain.  Distension.  COMPARISON:  CT CAP 06/14/2023 and 03/21/2023.  ACCESSION NUMBER(S):  [id number]  ORDERING CLINICIAN:  KAILA HALEY  TECHNIQUE:  CT of the abdomen and pelvis was performed.  Contiguous axial images  were obtained at 3 mm slice thickness through the abdomen and pelvis.   Coronal and sagittal " reconstructions at 3 mm slice thickness were  performed.  Omnipaque 350, 75 mL was administered intravenously.    FINDINGS:  LOWER CHEST:  Cardiac size is enlarged with pacer leads in the right atrium and  right ventricle.  Moderate calcified coronary plaque is noted.  Mild  calcified plaque is seen in the included descending thoracic aorta.   Thoracic aorta is not aneurysmal.  There is complete collapse and  consolidation of the left lung at the left lung base with loculated  effusion seen at the left lung base.  Increased AP diameter of the  thorax suggests chronic changes of COPD.  Respiratory motion limits  assessment in the lung bases and upper abdomen.  Small sliding-type  hiatal hernia is noted.    ABDOMEN:     LIVER:  Simple fluid density cysts are noted in the dome of the liver along  with subcentimeter hypodensities in the liver which are too small to  definitively characterize but statistically most likely represent  simple cysts or hemangiomas.  No hepatomegaly.       BILE DUCTS:  No intrahepatic or extrahepatic biliary ductal dilatation.     GALLBLADDER:  Gallbladder contains gallstones in the gallbladder neck but is  otherwise unremarkable.    STOMACH:  No abnormalities identified.     PANCREAS:  No masses or ductal dilatation.     SPLEEN:  No splenomegaly or focal splenic lesion.     ADRENAL GLANDS:  No thickening or nodules.     KIDNEYS AND URETERS:  Kidneys are normal in location.  Moderate bilateral renal cortical  thinning is seen with mild bilateral perinephric stranding.   Nonobstructing 6 mm calculus is seen in the mid left kidney.  No  ureteral calculi.     PELVIS:     BLADDER:  Urinary bladder is decompressed by Forrest catheter, limiting  assessment.       REPRODUCTIVE ORGANS:  No abnormalities identified.     BOWEL:  Diverticulosis is noted throughout the descending colon and sigmoid  colon.  There is subtle fat stranding adjacent to the proximal sigmoid  colon in the left upper pelvis which  may represent mild acute  diverticulitis.  Appendix is not definitively identified.  Terminal  ileum is unremarkable.       VESSELS:  Mild calcified atheromatous plaque is seen in the abdominal aorta and  iliac arteries.  No aneurysm.      PERITONEUM/RETROPERITONEUM/LYMPH NODES:  No free fluid.  No pneumoperitoneum.  No lymphadenopathy.     ABDOMINAL WALL:  No abnormalities identified.  SOFT TISSUES:   No abnormalities identified.     BONES:  No acute fracture or aggressive osseous lesion.  Generalized osseous  demineralization is noted.  There is a mild levoconvex curvature of  the lumbar spine centered at L4.  Moderate multilevel disc disease is  seen in the spine.  There is moderate bilateral mid and lower lumbar  facet arthrosis.  IMPRESSION:  1.  Subtle inflammatory change along the proximal sigmoid colon which  may represent a low-grade acute diverticulitis.  No definite  perforation or abscess.  2.  Complete collapse and consolidation of the left lung with a  loculated effusion at the left lung base, unchanged compared to the  prior chest CT and likely consistent with patient's reported  mesothelioma, possibly chronic post treatment change.  3.  Cardiomegaly with chronic changes suggesting COPD.  4.  Cholelithiasis.  5.  Moderate bilateral renal atrophy with nonobstructing 6 mm left  renal calculus.  6.  Additional chronic changes as described.  Signed by Lucas Owen            CT chest abdomen pelvis w IV contrast  Status: Final result     PACS Images     Show images for CT chest abdomen pelvis w IV contrast  Signed by    Signed Time Phone Pager   Collins Conde MD 5/18/2024 10:47 [phone]      Exam Information    Status Exam Begun Exam Ended   Final 5/18/2024 08:28 5/18/2024 08:38     Study Result    Narrative & Impression   Interpreted By:  Collins Conde,   STUDY:  CT CHEST ABDOMEN PELVIS W IV CONTRAST;  5/18/2024 8:38 am      INDICATION:  Signs/Symptoms:abd pain, LLQ TTP.      COMPARISON:  Chest CT  scan 12/10/2020. Chest and abdomen CT scan dated 06/14/2023.      ACCESSION NUMBER(S):  [id number]      ORDERING CLINICIAN:  THEA MURRAY      TECHNIQUE:  CT of the chest, abdomen, and pelvis was performed.  Contiguous axial  images were obtained at 3 mm slice thickness through the chest,  abdomen and pelvis. Coronal and sagittal reconstructions at 3 mm  slice thickness were performed.  75 ml of contrast Omnipaque were administered intravenously without  immediate complication.      FINDINGS:  CHEST:      LUNG/PLEURA/LARGE AIRWAYS:  Significant collapse of the left lung with heterogenous appearance,  pleural thickening and small loculated pleural fluid measuring 7.8 cm  consistent with the patient's known mesothelioma. Focus of left  posterior chest wall extension (series 201, image 77 and image 89) at  the level of the 9th 10th and 10th 11th rib spaces      Right lower lobe ill-defined patchy infiltrate measuring 2.3 x 1.4  cm. Trace right-sided pleural effusion with surrounding atelectasis.      Redemonstrated left hilar ill-defined enhancing fullness appears  extending to the left pulmonary trunk possibly tumor thrombus and  felt less likely bland thrombus.      VESSELS:  The aforementioned left hilar ill-defined soft tissue fullness  appears extending to the left pulmonary trunk with almost 50%  occlusion. Mild coronary artery calcifications      HEART:  The heart is normal in size.  Trace pericardial effusion. Right chest  wall pacemaker noted.      MEDIASTINUM AND ANDREA:  Multiple prominent mediastinal lymph nodes in the largest in the  subcarinal region measuring 1.6 cm. The esophagus is normal.      CHEST WALL AND LOWER NECK:  The soft tissues of the chest wall demonstrate no gross abnormality.  The visualized thyroid gland appears within normal limits.      ABDOMEN:      LIVER:  Normal size. Normal contour. Stable hypoattenuating lesions likely  benign in the largest segment 4A measuring 2 cm.      BILE  DUCTS:  The intrahepatic and extrahepatic ducts are not dilated.      GALLBLADDER:  Cholelithiasis.      PANCREAS:  The pancreas appears unremarkable without evidence of ductal  dilatation or masses.      SPLEEN:  The spleen is normal in size.      ADRENAL GLANDS:  Bilateral adrenal glands appear normal.      KIDNEYS AND URETERS:  The kidneys are normal in size and enhance symmetrically.  No  hydroureteronephrosis. Nonobstructive left midpole 0.6 cm calculus.      PELVIS:      BLADDER:  Underdistended with Forrest catheter in place.      REPRODUCTIVE ORGANS:  No pelvic masses.      BOWEL:  The stomach, small and large bowel are normal in appearance without  wall thickening or obstruction. Proximal sigmoid diverticulosis with  mild wall thickening could be related to episodes of underlying mild  diverticulitis. No ascites. Pneumoperitoneum.          VESSELS:  Mild vascular calcifications and atherosclerotic changes.      PERITONEUM/RETROPERITONEUM/LYMPH NODES:  No enlarged intra abdominopelvic lymphadenopathy.      BONE AND SOFT TISSUE:  Multilevel degenerative spine changes and facet joint disease. Trace  soft tissue thickening at the level of the umbilicus.      IMPRESSION:  CHEST:  1. Significant collapse of the left lung with heterogenous  appearance, pleural thickening and small loculated pleural fluid  measuring 7.8 cm consistent with the patient's known mesothelioma  which have worsened from the prior CT scan dated 06/14/2023 and  grossly unchanged from 12/10/2023 unenhanced CT scan allowing for  differences in techniques. Focus of new left posterior chest wall  invasion noted at the level of 9th 10th and 10th 11th rib spaces.  2. Right lower lobe ill-defined patchy infiltrate measuring 2.3 x 1.4  cm. Trace right-sided pleural effusion with surrounding atelectasis.  3. Redemonstrated left hilar ill-defined enhancing fullness appears  extending to the left pulmonary trunk likely tumor thrombus and felt  less  likely bland thrombus which has significantly worsened from  06/14/2023 CT scan.  4. Mildly enlarged multiple mediastinal lymph nodes in the largest in  the subcarinal region measuring 1.6 cm, grossly unchanged from prior.      ABDOMEN-PELVIS:  1. Proximal sigmoid diverticulosis with mild wall thickening could be  related to episodes of underlying mild uncomplicated diverticulitis  2. Other ancillary findings are unchanged.      The significant results of the study were relayed by me to and  acknowledged by Janes Nichols NP on 05/18/2024 at 10:40 a.m. over  the phone.      MACRO:  None      Signed by: Collins Conde 5/18/2024 10:47 AM  Dictation workstation:   [id number]                   XR chest 1 view  Status: Final result     PACS Images     Show images for XR chest 1 view  Signed by    Signed Time Phone Pager   Andrade Angel MD 5/16/2024 23:40 [phone]      Exam Information    Status Exam Begun Exam Ended   Final 5/16/2024 21:39 5/16/2024 21:40     Study Result    Narrative & Impression   STUDY:  Chest Radiograph;  5/16/2024 9:40 PM  INDICATION:  Weakness.  COMPARISON:  12/10/2023 XR Chest.  ACCESSION NUMBER(S):  [id number]  ORDERING CLINICIAN:  KAILA HALEY  TECHNIQUE:  Frontal chest was obtained at 21:40 hours.  FINDINGS:  CARDIOMEDIASTINAL SILHOUETTE:  Cardiomediastinal silhouette is normal in size and configuration.     LUNGS:  Complete opacification left hemithorax present.  This appears chronic  likely related to volume loss and chronic consolidation.  This appears  similar prior exams.     ABDOMEN:  No remarkable upper abdominal findings.     BONES:  No acute osseous changes.  IMPRESSION:  Complete opacification left hemithorax likely related to volume loss  and chronic consolidation.  Signed by Andrade Angel MD             Assessment/Plan     Right lower lobe pneumonia    Diverticulitis    Patient having persistent fevers patient has only been receiving antibiotics for 2  days  Diverticulitis does not appear to be bad there is no evidence of perforation or abscess formation but CT scan might miss an early perforation that may be generating fevers    Vancomycin for Staphylococcus and pneumococcus azithromycin for the possibility of atypical organisms meropenem for gram-negative's and aspiration        Consolidation lung certainly could be cause for fevers pneumonia probably have a difficult time for clearing given the extensive involvement of cancer from mesothelioma    Agree with current antibiotic coverage

## 2024-05-18 NOTE — PROGRESS NOTES
[facility]       Date:  5/18/2024  Patient:  Vinicius Arriola  YOB: 1935  MRN:  [id number]   Admit Date:  5/16/2024  ========================================================================================================    Chief Complaint   Patient presents with    Weakness, Gen     Mesothelioma.  Last chemo on Monday.  Increased weakness since Monday.  No other c/o         History of Present Illness:  Vinicius Arriola is a 88 y.o. 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 Rothman ([facility]), JOVANNI, basal cell ca on face, s/p removal and malignant mesothelioma, S/p L VATS with decort 5/2022  c/b post op ileus, XRT 9-10/2022    Presented to ED after chemo around last week with complaints of nausea vomiting and diarrhea.  Patient was admitted to medical floor.  Noted to have acute urinary retention status post Forrest placement by urology, and became hypotensive overnight, patient was minimally responsive after receiving IVF.  Hospitalist consulted ICU for hypotension.  Upon ICU evaluation patient was hypotensive, alert and oriented x 3, complains of nausea, vomiting, diarrhea, did have distended abdomen and TTP in LLQ.  On bedside ultrasound with collapsible IVC, patient was given 1.5 L LR bolus and will be transferred to ICU and repeat CT CAP with contrast was ordered.      CT shows known left lung collapse due to mesothelioma, but new left posterior chest wall invasion noted in 9-11th rib spaces, right lower lobe infiltration with pleural effusion and atelectasis, and new concern for left pulmonary trunk tumor thrombus versus bland thrombus versus significantly worsened from previous CT.      Interval ICU Events:  5/18: Hypotension overnight and febrile, transferred to ICU.  Consult oncology at [facility] d/t  CT results showing involvement of left pulmonary artery.   Treatment for sepsis (pneumonia) and hypovolemia with azithromycin, filiberto and vanco.  Follow cultures, atypical antigens.      Medical History:  Past Medical History:   Diagnosis Date    Acute embolism and thrombosis of unspecified deep veins of unspecified lower extremity (Multi)     Acute embolism and thrombosis of unspecified deep veins of unspecified lower extremity    Dental disease     Upper and lower dentures    Encounter for preprocedural cardiovascular examination 11/02/2021    Pre-operative cardiovascular examination    Obesity, unspecified 07/15/2022    Class 2 obesity with body mass index (BMI) of 38.0 to 38.9 in adult    Personal history of diseases of the blood and blood-forming organs and certain disorders involving the immune mechanism     History of polycythemia    Personal history of other diseases of male genital organs     History of benign prostatic hyperplasia    Personal history of other diseases of the circulatory system     History of hypertension    Personal history of other diseases of the circulatory system     History of cardiac arrhythmia    Personal history of other diseases of the circulatory system 10/21/2021    History of abnormal electrocardiography    Personal history of other diseases of the circulatory system     History of essential hypertension    Personal history of other diseases of the nervous system and sense organs     History of sleep apnea    Personal history of other malignant neoplasm of skin     History of malignant neoplasm of skin    Personal history of other specified conditions     History of balance disorder    Personal history of other venous thrombosis and embolism     History of deep venous thrombosis     Past Surgical History:   Procedure Laterality Date    OTHER SURGICAL HISTORY  08/20/2019    Hernia repair    OTHER SURGICAL HISTORY  08/20/2019    Tonsillectomy with adenoidectomy    OTHER SURGICAL HISTORY  08/20/2019    Cataract surgery    OTHER SURGICAL  HISTORY  06/10/2022    Pulmonary decortication    OTHER SURGICAL HISTORY  11/02/2021    Knee replacement    OTHER SURGICAL HISTORY  01/02/2020    Cardioversion    OTHER SURGICAL HISTORY  01/02/2020    Finger surgical procedure    OTHER SURGICAL HISTORY  05/13/2022    Cardiac catheterization with stent placement    OTHER SURGICAL HISTORY  05/26/2022    Pulmonary decortication    OTHER SURGICAL HISTORY  10/21/2021    Back surgery    OTHER SURGICAL HISTORY  11/02/2021    Colonoscopy    OTHER SURGICAL HISTORY  11/02/2021    Inguinal hernia repair    OTHER SURGICAL HISTORY  11/02/2021    Trigger finger repair    OTHER SURGICAL HISTORY  11/02/2021    Umbilical hernia repair    OTHER SURGICAL HISTORY  11/04/2021    Carpal tunnel surgery     Medications Prior to Admission   Medication Sig Dispense Refill Last Dose    ascorbic acid (Vitamin C) 1,000 mg tablet Take 1 tablet (1,000 mg) by mouth once daily.   5/16/2024 at a.m.    aspirin 81 mg EC tablet Take 1 tablet (81 mg) by mouth once daily at bedtime.   Past Week at p.m.    carboxymethylcellulose (Refresh Celluvisc) 1 % ophthalmic solution dropperette Administer 1 drop into affected eye(s) once daily in the morning.   5/16/2024 at a.m.    ferrous sulfate 325 (65 Fe) MG EC tablet Take 65 mg by mouth 2 times a day with meals. Do not crush, chew, or split.   5/16/2024 at a.m.    furosemide (Lasix) 20 mg tablet Take 1 tablet (20 mg) by mouth once daily.   5/16/2024 at a.m.    melatonin 5 mg tablet Take 1 tablet (5 mg) by mouth as needed at bedtime.   Past Week at p,m,    metoprolol tartrate (Lopressor) 25 mg tablet Take 1 tablet (25 mg) by mouth 2 times a day.   5/16/2024 at a.m.    omega-3 fatty acids-fish oil 360-1,200 mg capsule Take 1 capsule (1,200 mg) by mouth twice a day.   5/16/2024 at a.m.    potassium chloride CR (Klor-Con) 10 mEq ER tablet Take 1 tablet (10 mEq) by mouth once daily. 90 tablet 3 5/16/2024 at a.m.    promethazine-DM (Phenergan-DM) 6.25-15 mg/5 mL  syrup Take 1.25 mL by mouth 4 times a day as needed for cough.   Past Week    rosuvastatin (Crestor) 10 mg tablet Take 1 tablet (10 mg) by mouth once daily.   Past Week at p,m,    traZODone (Desyrel) 50 mg tablet Take 2 tablets (100 mg) by mouth once daily at bedtime.   Past Week at p.m.    ZINC ORAL Take 1 tablet by mouth once daily.   2024 at a.m.    ferrous sulfate, 325 mg ferrous sulfate, tablet TAKE 1 TABLET BY MOUTH TWICE A DAY WITH MEALS 180 tablet 3     nitroglycerin (Nitrostat) 0.4 mg SL tablet Place under the tongue every 5 minutes if needed for chest pain (up to 3 doses).   Unknown    warfarin (Coumadin) 1 mg tablet Take 1-2 tablets daily or as directed per [facility] 90 tablet 1     warfarin (Coumadin) 2 mg tablet Take 1 tablet (2 mg) by mouth see administration instructions. Take 1 tablet daily or as directed per [facility] 90 tablet 0     warfarin (Coumadin) 4 mg tablet TAKE AS DIRECTED Per [facility] Heart Protime Department 90 tablet 0      Benadryl allergy decongestant, Diphenhydramine, and Diphenhydramine hcl  Social History     Tobacco Use    Smoking status: Former     Current packs/day: 0.00     Average packs/day: 1 pack/day for 10.0 years (10.0 ttl pk-yrs)     Types: Cigarettes     Start date:      Quit date:      Years since quittin.4    Smokeless tobacco: Never   Vaping Use    Vaping status: Never Used   Substance Use Topics    Alcohol use: Yes     Alcohol/week: 4.0 standard drinks of alcohol     Types: 4 Cans of beer per week     Comment: 4 beers weekly    Drug use: Never     Family History   Problem Relation Name Age of Onset    Accidental death Mother      Coronary artery disease Mother      Other (Cardiac disorder) Father      Dementia Father      Cancer Father      Colon cancer Daughter         Review of Systems:  14 point review of systems was completed and negative except for those specially mention in my HPI    Physical Exam:    Heart Rate:  []  "  Temp:  [37 °C (98.6 °F)-39.8 °C (103.6 °F)]   Resp:  [18-24]   BP: ()/(47-74)   Height:  [172.7 cm (5' 8\")]   Weight:  [110 kg (243 lb 6.4 oz)]   SpO2:  [89 %-96 %]     Physical Exam  Vitals reviewed.   Constitutional:       General: He is not in acute distress.     Appearance: He is ill-appearing. He is not toxic-appearing or diaphoretic.   HENT:      Head: Normocephalic.      Mouth/Throat:      Mouth: Mucous membranes are moist.      Pharynx: Oropharynx is clear.   Eyes:      Extraocular Movements: Extraocular movements intact.      Conjunctiva/sclera: Conjunctivae normal.      Pupils: Pupils are equal, round, and reactive to light.   Cardiovascular:      Rate and Rhythm: Normal rate and regular rhythm.      Pulses: Normal pulses.      Heart sounds: Normal heart sounds.   Pulmonary:      Comments: Up to 4 L nasal cannula, expiratory wheezing, diminished left side and RLL  Abdominal:      Tenderness: There is abdominal tenderness.      Comments: TTP left lower quadrant, hyperactive bowel sounds, obese but distended, soft   Musculoskeletal:         General: No swelling. Normal range of motion.   Skin:     General: Skin is warm and dry.      Capillary Refill: Capillary refill takes less than 2 seconds.   Neurological:      General: No focal deficit present.      Mental Status: He is alert and oriented to person, place, and time. Mental status is at baseline.      Motor: Weakness present.   Psychiatric:         Mood and Affect: Mood normal.         Thought Content: Thought content normal.         Judgment: Judgment normal.         Objective:    I have reviewed all medications, laboratory results, and imaging pertinent for today's encounter    Assessment/Plan:    I am currently managing this critically ill patient for the following problems:    Neuro/Psych/Pain Ctrl/Sedation:  # Fever in the setting of recent chemo tx and pneumonia  -Tylenol as needed for pain or fever  -[facility] ICU and neurochecks per unit " protocol    Respiratory/ENT:  # Pneumonia, hx JOVANNI, PE  CT showing left pulmonary artery involvement of cancer (now likely has slight degree of pulmonary hypertension group 4/5?)  -Duonebs and saline Q4h  -Pulmonary hygiene  -Keep SpO2 greater than 92%, currently on 4 L nasal cannula  -ABX per ID section    Cardiovascular:  #hypovolemia in the setting of acute GI loses due to chemo  #PAD, Afib with 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, MR, HLD, CAD  -given additional 1.5L LR bolus  -Continue maintenance IVF  -MAP goal greater than 65  -Continue metoprolol, consider decreasing dosing if necessary-A-fib hx    GI:  # Diarrhea, excoriation  -Regular diet  -Insert FMS due to excoriation  -As needed Zofran for nausea    Renal/Volume Status (Intra & Extravascular):  #Acute urinary retention, s/p boyd placed by urology  -Boyd for retention  -Will need to follow-up with urology, PSA elevated  -Strict I's and O's  -Keep mag greater than 2, potassium greater than 4    Endocrine  -No active issues  -Monitor for signs and symptoms of hypo and hyperglycemia    Infectious Disease:  # Sepsis-likely pneumonia and hypovolemic  -Procal 0.33  -Send: MRSA nares, atypical antigens, Blood cultures, sputum  -UA negative  -Empiric Vanco, filiberto, azithromycin    Heme/Onc:  #mesothlieoma s/p chemo treatment   # Hx DVT PE on Coumadin  Primary oncologist: Dr. Mariely Ann  -[facility] oncology consult, concern with tumor invasion/thrombus into pulm artery  -Accepted under Dr. Raúl Willis at [facility] on telemetry floor  -Continue Coumadin  -Daily INR and CBC    OBGYN/MSK:  -PT OT when appropriate    Ethics/Code Status:  Full code    :  DVT Prophylaxis:  coumadin  GI Prophylaxis: N/A  Bowel Regimen: N/A  Diet: Regular  CVC: N/A  Racine: N/A  Boyd: Yes-retention  FMS: Yes-diarrhea  Restraints: N/A  Dispo: Admission to ICU this morning, accepted at [facility] under Dr. Raúl Willis (oncology)    Critical Care Time: 60 minutes  Discussed with   Jan Nichols, APRN-CNP

## 2024-05-19 ENCOUNTER — HOSPITAL ENCOUNTER (INPATIENT)
Facility: HOSPITAL | Age: 89
LOS: 23 days | Discharge: NURSING, REHAB OR LONG-TERM CARE | End: 2024-06-11
Attending: HOSPITALIST | Admitting: STUDENT IN AN ORGANIZED HEALTH CARE EDUCATION/TRAINING PROGRAM
Payer: MEDICARE

## 2024-05-19 ENCOUNTER — APPOINTMENT (OUTPATIENT)
Dept: RADIOLOGY | Facility: HOSPITAL | Age: 89
DRG: 871 | End: 2024-05-19
Payer: MEDICARE

## 2024-05-19 VITALS
OXYGEN SATURATION: 97 % | TEMPERATURE: 99 F | RESPIRATION RATE: 35 BRPM | WEIGHT: 249.56 LBS | HEIGHT: 68 IN | HEART RATE: 76 BPM | BODY MASS INDEX: 37.82 KG/M2 | DIASTOLIC BLOOD PRESSURE: 61 MMHG | SYSTOLIC BLOOD PRESSURE: 109 MMHG

## 2024-05-19 DIAGNOSIS — R33.9 RETENTION OF URINE: ICD-10-CM

## 2024-05-19 DIAGNOSIS — I11.0 HEART FAILURE DUE TO HIGH BLOOD PRESSURE (MULTI): ICD-10-CM

## 2024-05-19 DIAGNOSIS — R06.02 SHORTNESS OF BREATH: ICD-10-CM

## 2024-05-19 DIAGNOSIS — R05.9 COUGH, UNSPECIFIED TYPE: ICD-10-CM

## 2024-05-19 DIAGNOSIS — D50.0 IRON DEFICIENCY ANEMIA DUE TO CHRONIC BLOOD LOSS: ICD-10-CM

## 2024-05-19 DIAGNOSIS — C45.9 MESOTHELIOMA, MALIGNANT (MULTI): ICD-10-CM

## 2024-05-19 DIAGNOSIS — R12 HEARTBURN: ICD-10-CM

## 2024-05-19 DIAGNOSIS — I87.2 VENOUS INSUFFICIENCY: ICD-10-CM

## 2024-05-19 DIAGNOSIS — C45.9 MESOTHELIOMA (MULTI): Primary | ICD-10-CM

## 2024-05-19 DIAGNOSIS — I48.91 ATRIAL FIBRILLATION, UNSPECIFIED TYPE (MULTI): ICD-10-CM

## 2024-05-19 DIAGNOSIS — I25.10 ATHEROSCLEROSIS OF NATIVE CORONARY ARTERY OF NATIVE HEART WITHOUT ANGINA PECTORIS: ICD-10-CM

## 2024-05-19 DIAGNOSIS — I48.92 ATRIAL FLUTTER, UNSPECIFIED TYPE (MULTI): ICD-10-CM

## 2024-05-19 LAB
ANION GAP SERPL CALC-SCNC: 11 MMOL/L (ref 10–20)
BACTERIA SPEC RESP CULT: ABNORMAL
BNP SERPL-MCNC: 580 PG/ML (ref 0–99)
BUN SERPL-MCNC: 21 MG/DL (ref 6–23)
CALCIUM SERPL-MCNC: 8.5 MG/DL (ref 8.6–10.3)
CHLORIDE SERPL-SCNC: 100 MMOL/L (ref 98–107)
CO2 SERPL-SCNC: 27 MMOL/L (ref 21–32)
CREAT SERPL-MCNC: 1.32 MG/DL (ref 0.5–1.3)
EGFRCR SERPLBLD CKD-EPI 2021: 53 ML/MIN/1.73M*2
ERYTHROCYTE [DISTWIDTH] IN BLOOD BY AUTOMATED COUNT: 16.8 % (ref 11.5–14.5)
GLUCOSE SERPL-MCNC: 92 MG/DL (ref 74–99)
GRAM STN SPEC: ABNORMAL
HCT VFR BLD AUTO: 31.3 % (ref 41–52)
HGB BLD-MCNC: 10.1 G/DL (ref 13.5–17.5)
INR PPP: 2.7 (ref 0.9–1.1)
LEGIONELLA AG UR QL: NEGATIVE
MAGNESIUM SERPL-MCNC: 2.54 MG/DL (ref 1.6–2.4)
MCH RBC QN AUTO: 29 PG (ref 26–34)
MCHC RBC AUTO-ENTMCNC: 32.3 G/DL (ref 32–36)
MCV RBC AUTO: 90 FL (ref 80–100)
NRBC BLD-RTO: 0 /100 WBCS (ref 0–0)
PHOSPHATE SERPL-MCNC: 2.6 MG/DL (ref 2.5–4.9)
PLATELET # BLD AUTO: 135 X10*3/UL (ref 150–450)
POTASSIUM SERPL-SCNC: 4.3 MMOL/L (ref 3.5–5.3)
PROTHROMBIN TIME: 31 SECONDS (ref 9.8–12.8)
RBC # BLD AUTO: 3.48 X10*6/UL (ref 4.5–5.9)
S PNEUM AG UR QL: NEGATIVE
SODIUM SERPL-SCNC: 134 MMOL/L (ref 136–145)
WBC # BLD AUTO: 5.5 X10*3/UL (ref 4.4–11.3)

## 2024-05-19 PROCEDURE — 2500000004 HC RX 250 GENERAL PHARMACY W/ HCPCS (ALT 636 FOR OP/ED)

## 2024-05-19 PROCEDURE — 2500000002 HC RX 250 W HCPCS SELF ADMINISTERED DRUGS (ALT 637 FOR MEDICARE OP, ALT 636 FOR OP/ED)

## 2024-05-19 PROCEDURE — 82374 ASSAY BLOOD CARBON DIOXIDE: CPT

## 2024-05-19 PROCEDURE — 2500000001 HC RX 250 WO HCPCS SELF ADMINISTERED DRUGS (ALT 637 FOR MEDICARE OP)

## 2024-05-19 PROCEDURE — 2500000005 HC RX 250 GENERAL PHARMACY W/O HCPCS

## 2024-05-19 PROCEDURE — 1170000001 HC PRIVATE ONCOLOGY ROOM DAILY

## 2024-05-19 PROCEDURE — 85027 COMPLETE CBC AUTOMATED: CPT

## 2024-05-19 PROCEDURE — 83735 ASSAY OF MAGNESIUM: CPT

## 2024-05-19 PROCEDURE — 94640 AIRWAY INHALATION TREATMENT: CPT

## 2024-05-19 PROCEDURE — 74018 RADEX ABDOMEN 1 VIEW: CPT

## 2024-05-19 PROCEDURE — 36415 COLL VENOUS BLD VENIPUNCTURE: CPT

## 2024-05-19 PROCEDURE — 99223 1ST HOSP IP/OBS HIGH 75: CPT

## 2024-05-19 PROCEDURE — 99239 HOSP IP/OBS DSCHRG MGMT >30: CPT

## 2024-05-19 PROCEDURE — 94640 AIRWAY INHALATION TREATMENT: CPT | Performed by: STUDENT IN AN ORGANIZED HEALTH CARE EDUCATION/TRAINING PROGRAM

## 2024-05-19 PROCEDURE — 84100 ASSAY OF PHOSPHORUS: CPT

## 2024-05-19 PROCEDURE — 2500000006 HC RX 250 W HCPCS SELF ADMINISTERED DRUGS (ALT 637 FOR ALL PAYERS)

## 2024-05-19 PROCEDURE — 85610 PROTHROMBIN TIME: CPT

## 2024-05-19 PROCEDURE — 74018 RADEX ABDOMEN 1 VIEW: CPT | Performed by: RADIOLOGY

## 2024-05-19 PROCEDURE — 83880 ASSAY OF NATRIURETIC PEPTIDE: CPT

## 2024-05-19 RX ORDER — NITROGLYCERIN 0.4 MG/1
0.4 TABLET SUBLINGUAL EVERY 5 MIN PRN
Status: DISCONTINUED | OUTPATIENT
Start: 2024-05-19 | End: 2024-06-11 | Stop reason: HOSPADM

## 2024-05-19 RX ORDER — ASPIRIN 81 MG/1
81 TABLET ORAL NIGHTLY
Status: DISCONTINUED | OUTPATIENT
Start: 2024-05-19 | End: 2024-06-11 | Stop reason: HOSPADM

## 2024-05-19 RX ORDER — ONDANSETRON 4 MG/1
4 TABLET, FILM COATED ORAL EVERY 8 HOURS PRN
Status: DISCONTINUED | OUTPATIENT
Start: 2024-05-19 | End: 2024-06-11 | Stop reason: HOSPADM

## 2024-05-19 RX ORDER — ALBUTEROL SULFATE 0.83 MG/ML
2.5 SOLUTION RESPIRATORY (INHALATION) EVERY 6 HOURS PRN
Status: DISCONTINUED | OUTPATIENT
Start: 2024-05-19 | End: 2024-06-11 | Stop reason: HOSPADM

## 2024-05-19 RX ORDER — TRAZODONE HYDROCHLORIDE 50 MG/1
100 TABLET ORAL NIGHTLY
Status: DISCONTINUED | OUTPATIENT
Start: 2024-05-19 | End: 2024-06-05

## 2024-05-19 RX ORDER — FERROUS SULFATE 325(65) MG
65 TABLET ORAL 2 TIMES DAILY
Status: DISCONTINUED | OUTPATIENT
Start: 2024-05-19 | End: 2024-05-23

## 2024-05-19 RX ORDER — METOPROLOL TARTRATE 25 MG/1
25 TABLET, FILM COATED ORAL 2 TIMES DAILY
Status: DISCONTINUED | OUTPATIENT
Start: 2024-05-19 | End: 2024-05-21

## 2024-05-19 RX ORDER — CARBOXYMETHYLCELLULOSE SODIUM 10 MG/ML
1 GEL OPHTHALMIC AS NEEDED
Status: DISCONTINUED | OUTPATIENT
Start: 2024-05-19 | End: 2024-06-11 | Stop reason: HOSPADM

## 2024-05-19 RX ORDER — ONDANSETRON HYDROCHLORIDE 2 MG/ML
4 INJECTION, SOLUTION INTRAVENOUS EVERY 8 HOURS PRN
Status: DISCONTINUED | OUTPATIENT
Start: 2024-05-19 | End: 2024-06-11 | Stop reason: HOSPADM

## 2024-05-19 RX ORDER — ASCORBIC ACID 500 MG
1000 TABLET ORAL DAILY
Status: DISCONTINUED | OUTPATIENT
Start: 2024-05-20 | End: 2024-06-11 | Stop reason: HOSPADM

## 2024-05-19 RX ORDER — ACETAMINOPHEN 500 MG
5 TABLET ORAL NIGHTLY PRN
Status: DISCONTINUED | OUTPATIENT
Start: 2024-05-19 | End: 2024-06-11 | Stop reason: HOSPADM

## 2024-05-19 RX ORDER — LOPERAMIDE HYDROCHLORIDE 2 MG/1
2 CAPSULE ORAL 4 TIMES DAILY PRN
Status: DISCONTINUED | OUTPATIENT
Start: 2024-05-19 | End: 2024-05-19 | Stop reason: HOSPADM

## 2024-05-19 RX ORDER — WARFARIN 2 MG/1
2 TABLET ORAL DAILY
Status: DISCONTINUED | OUTPATIENT
Start: 2024-05-19 | End: 2024-05-23

## 2024-05-19 RX ORDER — MEROPENEM 1 G/1
1 INJECTION, POWDER, FOR SOLUTION INTRAVENOUS EVERY 12 HOURS
Status: DISCONTINUED | OUTPATIENT
Start: 2024-05-19 | End: 2024-05-20

## 2024-05-19 RX ORDER — IPRATROPIUM BROMIDE AND ALBUTEROL SULFATE 2.5; .5 MG/3ML; MG/3ML
3 SOLUTION RESPIRATORY (INHALATION)
Status: DISCONTINUED | OUTPATIENT
Start: 2024-05-19 | End: 2024-05-23

## 2024-05-19 RX ORDER — ROSUVASTATIN CALCIUM 10 MG/1
10 TABLET, COATED ORAL NIGHTLY
Status: DISCONTINUED | OUTPATIENT
Start: 2024-05-19 | End: 2024-06-11 | Stop reason: HOSPADM

## 2024-05-19 RX ORDER — NYSTATIN 100000 [USP'U]/G
1 POWDER TOPICAL 2 TIMES DAILY
Status: DISCONTINUED | OUTPATIENT
Start: 2024-05-19 | End: 2024-06-11 | Stop reason: HOSPADM

## 2024-05-19 RX ORDER — ERGOCALCIFEROL 1.25 MG/1
1250 CAPSULE ORAL
Status: DISCONTINUED | OUTPATIENT
Start: 2024-05-26 | End: 2024-06-11 | Stop reason: HOSPADM

## 2024-05-19 RX ADMIN — IPRATROPIUM BROMIDE AND ALBUTEROL SULFATE 3 ML: .5; 3 SOLUTION RESPIRATORY (INHALATION) at 21:45

## 2024-05-19 RX ADMIN — TRAZODONE HYDROCHLORIDE 100 MG: 50 TABLET ORAL at 20:53

## 2024-05-19 RX ADMIN — SODIUM CHLORIDE, SODIUM LACTATE, POTASSIUM CHLORIDE, AND CALCIUM CHLORIDE 100 ML/HR: 600; 310; 30; 20 INJECTION, SOLUTION INTRAVENOUS at 03:37

## 2024-05-19 RX ADMIN — SODIUM CHLORIDE, POTASSIUM CHLORIDE, SODIUM LACTATE AND CALCIUM CHLORIDE 1000 ML: 600; 310; 30; 20 INJECTION, SOLUTION INTRAVENOUS at 13:56

## 2024-05-19 RX ADMIN — ROSUVASTATIN CALCIUM 10 MG: 10 TABLET, FILM COATED ORAL at 20:53

## 2024-05-19 RX ADMIN — WARFARIN SODIUM 2 MG: 2 TABLET ORAL at 20:53

## 2024-05-19 RX ADMIN — METOPROLOL TARTRATE 25 MG: 25 TABLET, FILM COATED ORAL at 08:30

## 2024-05-19 RX ADMIN — FERROUS SULFATE TAB 325 MG (65 MG ELEMENTAL FE) 1 TABLET: 325 (65 FE) TAB at 20:53

## 2024-05-19 RX ADMIN — PROMETHAZINE HYDROCHLORIDE AND DEXTROMETHORPHAN HYDROBROMIDE ORAL 1.25 ML: 15; 6.25 SOLUTION ORAL at 03:38

## 2024-05-19 RX ADMIN — ERGOCALCIFEROL 1250 MCG: 1.25 CAPSULE ORAL at 08:30

## 2024-05-19 RX ADMIN — MEROPENEM 1 G: 1 INJECTION, POWDER, FOR SOLUTION INTRAVENOUS at 20:53

## 2024-05-19 RX ADMIN — IPRATROPIUM BROMIDE AND ALBUTEROL SULFATE 3 ML: 2.5; .5 SOLUTION RESPIRATORY (INHALATION) at 11:47

## 2024-05-19 RX ADMIN — Medication 5 L/MIN: at 20:00

## 2024-05-19 RX ADMIN — ASPIRIN 81 MG: 81 TABLET, COATED ORAL at 20:53

## 2024-05-19 RX ADMIN — NYSTATIN 1 APPLICATION: 100000 POWDER TOPICAL at 08:31

## 2024-05-19 RX ADMIN — IPRATROPIUM BROMIDE AND ALBUTEROL SULFATE 3 ML: 2.5; .5 SOLUTION RESPIRATORY (INHALATION) at 03:51

## 2024-05-19 RX ADMIN — IPRATROPIUM BROMIDE AND ALBUTEROL SULFATE 3 ML: 2.5; .5 SOLUTION RESPIRATORY (INHALATION) at 07:58

## 2024-05-19 RX ADMIN — Medication 36 PERCENT: at 08:00

## 2024-05-19 RX ADMIN — MEROPENEM 1 G: 1 INJECTION, POWDER, FOR SOLUTION INTRAVENOUS at 08:30

## 2024-05-19 RX ADMIN — NYSTATIN 1 APPLICATION: 100000 POWDER TOPICAL at 20:53

## 2024-05-19 RX ADMIN — FERROUS SULFATE TAB 325 MG (65 MG ELEMENTAL FE) 1 TABLET: 325 (65 FE) TAB at 08:30

## 2024-05-19 RX ADMIN — METOPROLOL TARTRATE 25 MG: 25 TABLET, FILM COATED ORAL at 20:53

## 2024-05-19 RX ADMIN — IPRATROPIUM BROMIDE AND ALBUTEROL SULFATE 3 ML: 2.5; .5 SOLUTION RESPIRATORY (INHALATION) at 15:48

## 2024-05-19 RX ADMIN — ACETAMINOPHEN 650 MG: 325 TABLET ORAL at 08:40

## 2024-05-19 RX ADMIN — Medication 1000 MG: at 08:30

## 2024-05-19 SDOH — HEALTH STABILITY: MENTAL HEALTH: HOW OFTEN DO YOU HAVE A DRINK CONTAINING ALCOHOL?: NEVER

## 2024-05-19 SDOH — SOCIAL STABILITY: SOCIAL INSECURITY: ARE THERE ANY APPARENT SIGNS OF INJURIES/BEHAVIORS THAT COULD BE RELATED TO ABUSE/NEGLECT?: NO

## 2024-05-19 SDOH — SOCIAL STABILITY: SOCIAL INSECURITY: HAVE YOU HAD THOUGHTS OF HARMING ANYONE ELSE?: NO

## 2024-05-19 SDOH — SOCIAL STABILITY: SOCIAL INSECURITY: DO YOU FEEL ANYONE HAS EXPLOITED OR TAKEN ADVANTAGE OF YOU FINANCIALLY OR OF YOUR PERSONAL PROPERTY?: NO

## 2024-05-19 SDOH — HEALTH STABILITY: MENTAL HEALTH
HOW OFTEN DO YOU NEED TO HAVE SOMEONE HELP YOU WHEN YOU READ INSTRUCTIONS, PAMPHLETS, OR OTHER WRITTEN MATERIAL FROM YOUR DOCTOR OR PHARMACY?: PATIENT DECLINES TO RESPOND

## 2024-05-19 SDOH — SOCIAL STABILITY: SOCIAL INSECURITY: HAS ANYONE EVER THREATENED TO HURT YOUR FAMILY OR YOUR PETS?: NO

## 2024-05-19 SDOH — SOCIAL STABILITY: SOCIAL INSECURITY: ARE YOU OR HAVE YOU BEEN THREATENED OR ABUSED PHYSICALLY, EMOTIONALLY, OR SEXUALLY BY ANYONE?: NO

## 2024-05-19 SDOH — ECONOMIC STABILITY: INCOME INSECURITY: HOW HARD IS IT FOR YOU TO PAY FOR THE VERY BASICS LIKE FOOD, HOUSING, MEDICAL CARE, AND HEATING?: NOT HARD AT ALL

## 2024-05-19 SDOH — HEALTH STABILITY: MENTAL HEALTH: HOW OFTEN DO YOU HAVE 6 OR MORE DRINKS ON ONE OCCASION?: NEVER

## 2024-05-19 SDOH — SOCIAL STABILITY: SOCIAL INSECURITY: WERE YOU ABLE TO COMPLETE ALL THE BEHAVIORAL HEALTH SCREENINGS?: NO

## 2024-05-19 SDOH — SOCIAL STABILITY: SOCIAL INSECURITY: HAVE YOU HAD ANY THOUGHTS OF HARMING ANYONE ELSE?: NO

## 2024-05-19 SDOH — ECONOMIC STABILITY: INCOME INSECURITY: IN THE LAST 12 MONTHS, WAS THERE A TIME WHEN YOU WERE NOT ABLE TO PAY THE MORTGAGE OR RENT ON TIME?: NO

## 2024-05-19 SDOH — HEALTH STABILITY: PHYSICAL HEALTH: ON AVERAGE, HOW MANY MINUTES DO YOU ENGAGE IN EXERCISE AT THIS LEVEL?: 0 MIN

## 2024-05-19 SDOH — ECONOMIC STABILITY: INCOME INSECURITY: IN THE PAST 12 MONTHS, HAS THE ELECTRIC, GAS, OIL, OR WATER COMPANY THREATENED TO SHUT OFF SERVICE IN YOUR HOME?: NO

## 2024-05-19 SDOH — SOCIAL STABILITY: SOCIAL INSECURITY: DOES ANYONE TRY TO KEEP YOU FROM HAVING/CONTACTING OTHER FRIENDS OR DOING THINGS OUTSIDE YOUR HOME?: NO

## 2024-05-19 SDOH — SOCIAL STABILITY: SOCIAL INSECURITY: ABUSE: ADULT

## 2024-05-19 SDOH — ECONOMIC STABILITY: HOUSING INSECURITY: IN THE LAST 12 MONTHS, HOW MANY PLACES HAVE YOU LIVED?: 1

## 2024-05-19 SDOH — SOCIAL STABILITY: SOCIAL INSECURITY: DO YOU FEEL UNSAFE GOING BACK TO THE PLACE WHERE YOU ARE LIVING?: NO

## 2024-05-19 SDOH — HEALTH STABILITY: PHYSICAL HEALTH: ON AVERAGE, HOW MANY DAYS PER WEEK DO YOU ENGAGE IN MODERATE TO STRENUOUS EXERCISE (LIKE A BRISK WALK)?: 0 DAYS

## 2024-05-19 SDOH — HEALTH STABILITY: MENTAL HEALTH: HOW MANY STANDARD DRINKS CONTAINING ALCOHOL DO YOU HAVE ON A TYPICAL DAY?: PATIENT DOES NOT DRINK

## 2024-05-19 SDOH — SOCIAL STABILITY: SOCIAL INSECURITY: WERE YOU ABLE TO COMPLETE ALL THE BEHAVIORAL HEALTH SCREENINGS?: YES

## 2024-05-19 ASSESSMENT — ACTIVITIES OF DAILY LIVING (ADL)
TOILETING: INDEPENDENT
FEEDING YOURSELF: NEEDS ASSISTANCE
GROOMING: INDEPENDENT
FEEDING YOURSELF: INDEPENDENT
ADEQUATE_TO_COMPLETE_ADL: YES
GROOMING: NEEDS ASSISTANCE
DRESSING YOURSELF: INDEPENDENT
ADEQUATE_TO_COMPLETE_ADL: YES
PATIENT'S MEMORY ADEQUATE TO SAFELY COMPLETE DAILY ACTIVITIES?: YES
TOILETING: NEEDS ASSISTANCE
DRESSING YOURSELF: NEEDS ASSISTANCE
BATHING: INDEPENDENT
PATIENT'S MEMORY ADEQUATE TO SAFELY COMPLETE DAILY ACTIVITIES?: UNABLE TO ASSESS
BATHING: NEEDS ASSISTANCE
WALKS IN HOME: INDEPENDENT
WALKS IN HOME: UNABLE TO ASSESS
LACK_OF_TRANSPORTATION: PATIENT DECLINED
HEARING - RIGHT EAR: FUNCTIONAL
ASSISTIVE_DEVICE: WALKER
HEARING - LEFT EAR: FUNCTIONAL
HEARING - LEFT EAR: FUNCTIONAL
JUDGMENT_ADEQUATE_SAFELY_COMPLETE_DAILY_ACTIVITIES: YES
JUDGMENT_ADEQUATE_SAFELY_COMPLETE_DAILY_ACTIVITIES: UNABLE TO ASSESS
HEARING - RIGHT EAR: FUNCTIONAL

## 2024-05-19 ASSESSMENT — PAIN SCALES - GENERAL
PAINLEVEL_OUTOF10: 0 - NO PAIN

## 2024-05-19 ASSESSMENT — COLUMBIA-SUICIDE SEVERITY RATING SCALE - C-SSRS
6. HAVE YOU EVER DONE ANYTHING, STARTED TO DO ANYTHING, OR PREPARED TO DO ANYTHING TO END YOUR LIFE?: NO
1. IN THE PAST MONTH, HAVE YOU WISHED YOU WERE DEAD OR WISHED YOU COULD GO TO SLEEP AND NOT WAKE UP?: NO
2. HAVE YOU ACTUALLY HAD ANY THOUGHTS OF KILLING YOURSELF?: NO

## 2024-05-19 ASSESSMENT — PAIN - FUNCTIONAL ASSESSMENT
PAIN_FUNCTIONAL_ASSESSMENT: 0-10

## 2024-05-19 ASSESSMENT — COGNITIVE AND FUNCTIONAL STATUS - GENERAL
TOILETING: A LOT
STANDING UP FROM CHAIR USING ARMS: A LITTLE
STANDING UP FROM CHAIR USING ARMS: TOTAL
HELP NEEDED FOR BATHING: A LOT
DAILY ACTIVITIY SCORE: 11
PERSONAL GROOMING: A LOT
DRESSING REGULAR UPPER BODY CLOTHING: A LOT
DRESSING REGULAR LOWER BODY CLOTHING: TOTAL
WALKING IN HOSPITAL ROOM: TOTAL
EATING MEALS: A LOT
TOILETING: A LITTLE
CLIMB 3 TO 5 STEPS WITH RAILING: TOTAL
EATING MEALS: A LITTLE
DAILY ACTIVITIY SCORE: 11
CLIMB 3 TO 5 STEPS WITH RAILING: TOTAL
TOILETING: A LOT
MOVING FROM LYING ON BACK TO SITTING ON SIDE OF FLAT BED WITH BEDRAILS: A LOT
CLIMB 3 TO 5 STEPS WITH RAILING: A LITTLE
MOBILITY SCORE: 7
MOVING FROM LYING ON BACK TO SITTING ON SIDE OF FLAT BED WITH BEDRAILS: A LOT
TURNING FROM BACK TO SIDE WHILE IN FLAT BAD: TOTAL
MOBILITY SCORE: 7
DRESSING REGULAR LOWER BODY CLOTHING: TOTAL
WALKING IN HOSPITAL ROOM: A LITTLE
PERSONAL GROOMING: A LOT
TURNING FROM BACK TO SIDE WHILE IN FLAT BAD: TOTAL
HELP NEEDED FOR BATHING: A LITTLE
TURNING FROM BACK TO SIDE WHILE IN FLAT BAD: A LITTLE
WALKING IN HOSPITAL ROOM: TOTAL
MOBILITY SCORE: 18
PATIENT BASELINE BEDBOUND: NO
DRESSING REGULAR UPPER BODY CLOTHING: A LITTLE
MOVING TO AND FROM BED TO CHAIR: TOTAL
MOVING TO AND FROM BED TO CHAIR: TOTAL
PATIENT BASELINE BEDBOUND: NO
PERSONAL GROOMING: A LITTLE
DRESSING REGULAR LOWER BODY CLOTHING: A LITTLE
STANDING UP FROM CHAIR USING ARMS: TOTAL
MOVING TO AND FROM BED TO CHAIR: A LITTLE
DAILY ACTIVITIY SCORE: 18
DRESSING REGULAR UPPER BODY CLOTHING: A LOT
EATING MEALS: A LOT
HELP NEEDED FOR BATHING: A LOT
MOVING FROM LYING ON BACK TO SITTING ON SIDE OF FLAT BED WITH BEDRAILS: A LITTLE

## 2024-05-19 ASSESSMENT — LIFESTYLE VARIABLES
SKIP TO QUESTIONS 9-10: 1
AUDIT-C TOTAL SCORE: 0
SUBSTANCE_ABUSE_PAST_12_MONTHS: NO
AUDIT-C TOTAL SCORE: 0
HOW OFTEN DO YOU HAVE 6 OR MORE DRINKS ON ONE OCCASION: NEVER
AUDIT-C TOTAL SCORE: 0
HOW MANY STANDARD DRINKS CONTAINING ALCOHOL DO YOU HAVE ON A TYPICAL DAY: PATIENT DOES NOT DRINK
HOW OFTEN DO YOU HAVE 6 OR MORE DRINKS ON ONE OCCASION: NEVER
SKIP TO QUESTIONS 9-10: 1
SKIP TO QUESTIONS 9-10: 1
HOW MANY STANDARD DRINKS CONTAINING ALCOHOL DO YOU HAVE ON A TYPICAL DAY: PATIENT DOES NOT DRINK
HOW OFTEN DO YOU HAVE A DRINK CONTAINING ALCOHOL: NEVER
AUDIT-C TOTAL SCORE: 0
HOW OFTEN DO YOU HAVE A DRINK CONTAINING ALCOHOL: NEVER
PRESCIPTION_ABUSE_PAST_12_MONTHS: NO
AUDIT-C TOTAL SCORE: 0

## 2024-05-19 ASSESSMENT — PATIENT HEALTH QUESTIONNAIRE - PHQ9
SUM OF ALL RESPONSES TO PHQ9 QUESTIONS 1 & 2: 0
1. LITTLE INTEREST OR PLEASURE IN DOING THINGS: NOT AT ALL
2. FEELING DOWN, DEPRESSED OR HOPELESS: NOT AT ALL
1. LITTLE INTEREST OR PLEASURE IN DOING THINGS: NOT AT ALL
2. FEELING DOWN, DEPRESSED OR HOPELESS: NOT AT ALL
SUM OF ALL RESPONSES TO PHQ9 QUESTIONS 1 & 2: 0

## 2024-05-19 ASSESSMENT — ENCOUNTER SYMPTOMS
COUGH: 1
VOMITING: 0
ABDOMINAL PAIN: 1
DIARRHEA: 1
FEVER: 1
NAUSEA: 0
ABDOMINAL DISTENTION: 1

## 2024-05-19 NOTE — PROGRESS NOTES
Vancomycin Dosing by Pharmacy- Cessation of Therapy    Consult to pharmacy for vancomycin dosing has been discontinued by the prescriber, pharmacy will sign off at this time.    Please call pharmacy if there are further questions or re-enter a consult if vancomycin is resumed.     Stephanie Haines, PharmD

## 2024-05-19 NOTE — H&P
Internal Medicine Admission note   [facility]  May 19, 2024   Patient: Vinicius Arriola    Medical Record: [id number]    SUBJECTIVE     Vinicius Arriola is a 88 y.o. year old male patient with Past Medical History of  PAD, afib/flutter, hx DVT on warfarin, HTN, CAD s/p stent, mitral regurg, HLD, polycythemia vera, managed by Dr. Rothman ([facility]), JOVANNI, basal cell ca on face, s/p removal and malignant mesothelioma, S/p L VATS with decort 5/2022  c/b post op ileus, XRT 9-10/2022, presenting to [facility] with abdominal pain, fever and diarrhea post first Chemotherapy session on 5/16. Treated empirically for sepsis presumed due to UTI/ Pneumonia, CT CAP with concern for tumor thrombus,  presented as a transfer to [facility] for further management.     OSH Course:    Presented to ED after chemo around last week with complaints of nausea vomiting and diarrhea.  Patient was admitted to medical floor.  Noted to have acute urinary retention status post Forrest placement by urology, and became hypotensive overnight, patient was minimally responsive after receiving IVF.  Hospitalist consulted ICU for hypotension.  Upon ICU evaluation patient was hypotensive, alert and oriented x 3, complains of nausea, vomiting, diarrhea, did have distended abdomen and TTP in LLQ.  On bedside ultrasound with collapsible IVC, patient was given 1.5 L LR bolus and will be transferred to ICU and repeat CT CAP with contrast was ordered.  CT shows known left lung collapse due to mesothelioma, but new left posterior chest wall invasion noted in 9-11th rib spaces, right lower lobe infiltration with pleural effusion and atelectasis, and new concern for left pulmonary trunk tumor thrombus. With some concern for mild diverticulitis with no complication The patient was kept on 100 ml/hr fluid, on upgraded to meropenum today before transfer.     The patient was seen at bedside in [facility]:   He reports he was found to have a recurrence of  Mesothelioma on PET scan and underwent first chemotherapy session on 5/15/ 2024, after which he developed fever, diarrhea, nausea an vomiting. He also noted a cough that started 1 month ago, non productive.   The diarrhea is non bloody about 5-6 times and subsided in the past day during the admission, no current vomiting episodes, he remains febrile today, no urinary sx, denies Shortness of breath but has a new oxygen requirement since yesterday. Is also noted to have tachypnea in the 30s.  No LOC, no weakness, no rashes, no URI sx.     Vitals: reported temp in the syst at OSH today 38 T Max, current 37.2, on NC 5 L sat 95 %, RR 30, /74. Oriented to person, place, time, situation, but would be intermittently mentions things out of context like saying ( remove the tool box from the bed, or I need to go to the 4th floor/ out of here). FMS in place with minimal green stool. Forrest in place.    Wt 108 kg on 5/16 on > 113 kg today    12 point ROS otherwise negative, unless indicated above.     Past Medical History:    Past Medical History:   Diagnosis Date    Acute embolism and thrombosis of unspecified deep veins of unspecified lower extremity (Multi)     Acute embolism and thrombosis of unspecified deep veins of unspecified lower extremity    Dental disease     Upper and lower dentures    Encounter for preprocedural cardiovascular examination 11/02/2021    Pre-operative cardiovascular examination    Obesity, unspecified 07/15/2022    Class 2 obesity with body mass index (BMI) of 38.0 to 38.9 in adult    Personal history of diseases of the blood and blood-forming organs and certain disorders involving the immune mechanism     History of polycythemia    Personal history of other diseases of male genital organs     History of benign prostatic hyperplasia    Personal history of other diseases of the circulatory system     History of hypertension    Personal history of other diseases of the circulatory system      History of cardiac arrhythmia    Personal history of other diseases of the circulatory system 10/21/2021    History of abnormal electrocardiography    Personal history of other diseases of the circulatory system     History of essential hypertension    Personal history of other diseases of the nervous system and sense organs     History of sleep apnea    Personal history of other malignant neoplasm of skin     History of malignant neoplasm of skin    Personal history of other specified conditions     History of balance disorder    Personal history of other venous thrombosis and embolism     History of deep venous thrombosis       Home Medications  Medications Prior to Admission   Medication Sig Dispense Refill Last Dose    ascorbic acid (Vitamin C) 1,000 mg tablet Take 1 tablet (1,000 mg) by mouth once daily.       aspirin 81 mg EC tablet Take 1 tablet (81 mg) by mouth once daily at bedtime.       carboxymethylcellulose (Refresh Celluvisc) 1 % ophthalmic solution dropperette Administer 1 drop into affected eye(s) once daily in the morning.       ferrous sulfate 325 (65 Fe) MG EC tablet Take 65 mg by mouth 2 times a day with meals. Do not crush, chew, or split.       ferrous sulfate, 325 mg ferrous sulfate, tablet TAKE 1 TABLET BY MOUTH TWICE A DAY WITH MEALS 180 tablet 3     furosemide (Lasix) 20 mg tablet Take 1 tablet (20 mg) by mouth once daily.       melatonin 5 mg tablet Take 1 tablet (5 mg) by mouth as needed at bedtime.       metoprolol tartrate (Lopressor) 25 mg tablet Take 1 tablet (25 mg) by mouth 2 times a day.       nitroglycerin (Nitrostat) 0.4 mg SL tablet Place under the tongue every 5 minutes if needed for chest pain (up to 3 doses).       omega-3 fatty acids-fish oil 360-1,200 mg capsule Take 1 capsule (1,200 mg) by mouth twice a day.       potassium chloride CR (Klor-Con) 10 mEq ER tablet Take 1 tablet (10 mEq) by mouth once daily. 90 tablet 3     promethazine-DM (Phenergan-DM) 6.25-15 mg/5 mL  syrup Take 1.25 mL by mouth 4 times a day as needed for cough.       rosuvastatin (Crestor) 10 mg tablet Take 1 tablet (10 mg) by mouth once daily.       traZODone (Desyrel) 50 mg tablet Take 2 tablets (100 mg) by mouth once daily at bedtime.       warfarin (Coumadin) 1 mg tablet Take 1-2 tablets daily or as directed per [facility] 90 tablet 1     warfarin (Coumadin) 2 mg tablet Take 1 tablet (2 mg) by mouth see administration instructions. Take 1 tablet daily or as directed per [facility] 90 tablet 0     warfarin (Coumadin) 4 mg tablet TAKE AS DIRECTED [facility] 90 tablet 0     ZINC ORAL Take 1 tablet by mouth once daily.          Active Medication:  [START ON 5/20/2024] ascorbic acid, 1,000 mg, oral, Daily  aspirin, 81 mg, oral, Nightly  [START ON 5/26/2024] ergocalciferol, 1,250 mcg, oral, Every Sunday  ferrous sulfate (325 mg ferrous sulfate), 65 mg of iron, oral, BID  ipratropium-albuteroL, 3 mL, nebulization, q4h  meropenem, 1 g, intravenous, q12h  metoprolol tartrate, 25 mg, oral, BID  nystatin, 1 Application, Topical, BID  oxygen, , inhalation, Continuous - Inhalation  rosuvastatin, 10 mg, oral, Nightly  traZODone, 100 mg, oral, Nightly  warfarin, 2 mg, oral, Daily      PRN medications: carboxymethylcellulose, melatonin, [Held by provider] nitroglycerin, ondansetron **OR** ondansetron    Surgical History:    Past Surgical History:   Procedure Laterality Date    OTHER SURGICAL HISTORY  08/20/2019    Hernia repair    OTHER SURGICAL HISTORY  08/20/2019    Tonsillectomy with adenoidectomy    OTHER SURGICAL HISTORY  08/20/2019    Cataract surgery    OTHER SURGICAL HISTORY  06/10/2022    Pulmonary decortication    OTHER SURGICAL HISTORY  11/02/2021    Knee replacement    OTHER SURGICAL HISTORY  01/02/2020    Cardioversion    OTHER SURGICAL HISTORY  01/02/2020    Finger surgical procedure    OTHER SURGICAL HISTORY  05/13/2022    Cardiac catheterization with stent placement  "   OTHER SURGICAL HISTORY  05/26/2022    Pulmonary decortication    OTHER SURGICAL HISTORY  10/21/2021    Back surgery    OTHER SURGICAL HISTORY  11/02/2021    Colonoscopy    OTHER SURGICAL HISTORY  11/02/2021    Inguinal hernia repair    OTHER SURGICAL HISTORY  11/02/2021    Trigger finger repair    OTHER SURGICAL HISTORY  11/02/2021    Umbilical hernia repair    OTHER SURGICAL HISTORY  11/04/2021    Carpal tunnel surgery       Allergies:  Allergies   Allergen Reactions    Benadryl Allergy Decongestant Anxiety and Insomnia    Diphenhydramine Anxiety     anxious    Diphenhydramine Hcl Anxiety and Insomnia       Social History:  Tob: quit 40-50 years ago, 1 ppd x 10 years.  EtOH: 1 glass of wine daily, beer couple times per week  Illicits: none    Lives with his wife, uses a walker at home, able to take care of himself as reported by the patient  Retired [employer].     Family History:  daughter - colon ca dx 48 yo  paternal uncle - colon ca dx 70s  maternal grandmother - unknown ca dx 70s    OBJECTIVE     Vitals:    Vitals:    05/19/24 1843 05/19/24 1845   BP: 147/74 147/74   BP Location: Right arm Right arm   Patient Position: Lying Sitting   Pulse: 96 96   Resp: 22 (!) 40   Temp: 37.2 °C (99 °F) 37.2 °C (99 °F)   TempSrc: Temporal Temporal   SpO2: 95% 95%   Weight:  110 kg (242 lb 8.1 oz)   Height:  1.727 m (5' 7.99\")     Failed to redirect to the Timeline version of the [employer] SmartLink.  No intake or output data in the 24 hours ending 05/19/24 1918      Physical Exam  Gen: well appearing, A+Ox3, in no acute distress  HEENT: EOMI, sclera anicteric, no conjunctival injection, MMM  Resp: CTAB, normal breath sounds, no wheezing/crackles/ rales  CV: decrease breath sounds on the left side, crackles on the right side. normal S1/S2  GI: Distended, mild tenderness all over, tympanic. no rebound or guarding  Extremities/MSK: warm and well perfused, Frederick LL edema up to knees 2+  Neuro: A+Ox3.  Skin: warm and dry, no lesions, " no rashes     Labs:   Lab Results   Component Value Date    WBC 5.5 05/19/2024    HGB 10.1 (L) 05/19/2024    HCT 31.3 (L) 05/19/2024    MCV 90 05/19/2024     (L) 05/19/2024     Lab Results   Component Value Date    GLUCOSE 92 05/19/2024    CALCIUM 8.5 (L) 05/19/2024     (L) 05/19/2024    K 4.3 05/19/2024    CO2 27 05/19/2024     05/19/2024    BUN 21 05/19/2024    CREATININE 1.32 (H) 05/19/2024     Lab Results   Component Value Date    ALT 12 05/17/2024    AST 17 05/17/2024    ALKPHOS 60 05/17/2024    BILITOT 0.7 05/17/2024     Lab Results   Component Value Date    INR 2.7 (H) 05/19/2024    INR 2.4 (H) 05/18/2024    INR 2.0 (H) 05/17/2024    PROTIME 31.0 (H) 05/19/2024    PROTIME 27.1 (H) 05/18/2024    PROTIME 22.5 (H) 05/17/2024       Imaging:   CT chest abdomen pelvis w IV contrast   5/18/2024    IMPRESSION:  CHEST:  1. Significant collapse of the left lung with heterogenous  appearance, pleural thickening and small loculated pleural fluid  measuring 7.8 cm consistent with the patient's known mesothelioma  which have worsened from the prior CT scan dated 06/14/2023 and  grossly unchanged from 12/10/2023 unenhanced CT scan allowing for  differences in techniques. Focus of new left posterior chest wall  invasion noted at the level of 9th 10th and 10th 11th rib spaces.  2. Right lower lobe ill-defined patchy infiltrate measuring 2.3 x 1.4  cm. Trace right-sided pleural effusion with surrounding atelectasis.  3. Redemonstrated left hilar ill-defined enhancing fullness appears  extending to the left pulmonary trunk likely tumor thrombus and felt  less likely bland thrombus which has significantly worsened from  06/14/2023 CT scan.  4. Mildly enlarged multiple mediastinal lymph nodes in the largest in  the subcarinal region measuring 1.6 cm, grossly unchanged from prior.      ABDOMEN-PELVIS:  1. Proximal sigmoid diverticulosis with mild wall thickening could be  related to episodes of underlying mild  uncomplicated diverticulitis  2. Other ancillary findings are unchanged.      The significant results of the study were relayed by me to and  acknowledged by Janes Nichols NP on 05/18/2024 at 10:40 a.m. over  the phone.    === 05/16/24 ===    US RENAL COMPLETE    - Impression -  1. Diffuse bilateral renal cortical thinning in keeping with chronic  renal disease. No hydronephrosis.  2. Nonobstructing 1 cm left renal calculus.  3. Decompressed urinary bladder due to Forrest catheter, limiting its  evaluation.      ASSESSMENT / PLAN      Vinicius Arriola is a 88 y.o. year old male patient with Past Medical History of  PAD, afib/flutter, hx DVT on warfarin, HTN, CAD s/p stent, mitral regurg, HLD, polycythemia vera, managed by Dr. Rothman ([facility]), JOVANNI, basal cell ca on face, s/p removal and radiation of malignant mesothelioma, S/p L VATS with decort 5/2022  c/b post op ileus, XRT 9-10/2022, found to have a recurrence of Mesothelioma on PET scan and underwent first chemotherapy session on 5/15/ 2024, after which he developed fever, diarrhea, nausea an vomiting. Admitted to [facility] and Treated empirically for sepsis presumed due to UTI/ Pneumonia iso of CT CAP showing Significant collapse of the left lung worsened appearence of known Mesothelioma with concern for tumor thrombus, and new chest wall invasion, Right lower lobe ill-defined patchy infiltrate, as well as mild uncomplicated diverticulitis. Hospital course complicated with hypotension that responded to fluids, and new oxygen requirement, as well as persistent while on abx. presented as a transfer to [facility] for further management.      The patient persistant fever while on abx (vanc/ zosyn/ azithromycin) since 5/16 with no definitive source isolated, could be related to pneumonia iso CT finding of new Right lower lobe ill-defined patchy infiltrate, and new oxygen requirement. Although new AHRF could be related to pulmonary edema iso of fluid resuscitation  received in the ICU with clinical sx of hypervolemia and wt inc from 108 kg on 5/16 on > 113 kg today,   Other differential for the fever the noted mild diverticulitis on CT, malignancy related, Tumor thrombus, or Immunomodulator therapy side effect. The patient is currently HDS with no indication for ICU. Would Continue to treat empirically with meropenum.      # Fever  # Pneumonia, Right lower lobe ill-defined patchy infiltrate  # Worsening mesothelioma  # tumor Thrombus     - VANC/ ZOSYN/azithromycin 5/16- 5/18  - Currently on meropenum 5/19  - UA 5/18 with small LE, 3+ bacteria   - Resp Cult salivary contamination  - MRSA nares: negative  - no urine culture  - Strep/Legionella antigens: negative  - BC 5/16 NGTD, sent repeat BCs  - Stool workup not done initially, ordered C- Diff, Stool pathogen  - order repeat UA and culture , Resp cult  - CRP, Proc  - X ray abdomen iso of distended tender abd.  - Continue warfarin as the patient is HDS, with no ICU requirement .     #AHRF  #Mesothelioma with Loculated Pleural Effusions   #Left Hilar Mass, Left Pulmonary Trunk Mass  #History JOVANNI, Pulmonary Embolism  - CT chest 5/18 with significant collapse of left lung, loculated pleural effusions consistent with patient's known mesothelioma.  Also shows ill-defined patchy infiltrate in right lower lobe.  Shows left hilar mass with extension into left pulmonary trunk likely tumor thrombus   -  On 5L nasal cannula, wean supplemental O2 for SpO2 goal >90% Assess volume statues, and monitor BP consider lasix in the am, was on home lasix 20mg.     # VIK- resolving  # urine retention s/p Boyd Catheter Placement by Urology in OSH  - ctm RFP  - US renal 5/16: Nonobstructing 1 cm left renal calculus, no hydronephrosis  - Attempt boyd removal and void trial   - strict in/out    # Down trending Hb 10 < 13.4 on admission  #History Afib, HLD, CAD, PAD  #History DVT/PE on Warfarin  - hb downtrend likely iatrogenic iso of repeated blood  draws, fluid resuscitation. Currently no sx of overt bleeding.  - would consider holding AC if significant Hb drop on follow up CBC,  - Continue home metoprolol 25 mg twice daily  - Continue home aspirin, statin, warfarin    #Mesothelioma   Malignant Pleural Mesothelioma, epithelioid type  Follows with  Oncology Dr. Ann  Prior Therapy:     -- L VATS with pulmonary decortication 5/2022     -- XRT 9/30/22 - 10/6/22     -  Patient recently had visit with Dr. Ann on 5/13, given his recent PET scan shows progression of disease he opted to initiate dual immune therapy.   - CT chest findings this admission show advancement of disease with possible tumor thrombus in left pulmonary trunk     # patient is high risk for hospital delirium:  - apply delirium precautions    Msc: on Trazodone for insomnia    Antibiotics:: Meropenum   Dispo: PT/ OT    F: Replete PRN  E: Keep Mg >2, phos >3  and K >4  N: regular diet  A: PIV  O2: On NC 5 L  DVT ppx: warfarin  Code Status: Full code  Contact: FlakoKirsten (Spouse)  [phone] (Work Phone)     Brianna Flores, PGY1 Internal Medicine    SIGNATURE: Brianna Flores MD PATIENT NAME: Vinicius Arriola   DATE: May 19, 2024 MRN: [id number]

## 2024-05-19 NOTE — PROGRESS NOTES
History of mesothelioma CT shows known left lung collapse      Nausea vomiting receiving chemotherapy 3 days prior to admit  Complaining of fatigue     Difficulty urinating     Was found to be hypotensive tachycardic  Fevers 38 1  White count 14,400 and  COVID-19 negative  CRP 30 sed rate 49 procalcitonin 1.33     History of BPH  Urinary retention Forrest placed  No dysuria no hematuria no flank pain no fevers chills     Blood cultures from 5/16/2024 and 5/17/2024 showed no growth        Patient initially on vancomycin and Zosyn switched to vancomycin meropenem azithromycin   CT scan showing some minimal inflammation possible diverticulitis     CT scan chest showing right lower lobe infiltrate  Left lung collapse pleural thickening     Fevers              Review of Systems   Constitutional:  Positive for fever.   Respiratory:  Positive for cough.    Cardiovascular:  Positive for leg swelling.   Gastrointestinal:  Positive for abdominal distention, abdominal pain and diarrhea. Negative for nausea and vomiting.   Genitourinary: Negative.         Physical Exam  Constitutional:       Appearance: He is obese.   Cardiovascular:      Heart sounds: Normal heart sounds. No murmur heard.  Pulmonary:      Effort: No respiratory distress.      Breath sounds: Rhonchi present. No wheezing or rales.   Abdominal:      General: Abdomen is flat. Bowel sounds are normal. There is distension.      Palpations: Abdomen is soft. There is no mass.      Tenderness: There is no abdominal tenderness. There is no guarding.          Range of Vitals (last 24 hours)  Heart Rate:  [68-94]   Temp:  [37 °C (98.6 °F)-38.6 °C (101.5 °F)]   Resp:  [25-40]   BP: ()/(43-69)   Weight:  [113 kg (249 lb 9 oz)]   SpO2:  [90 %-99 %]     Relevant Results  Results from last 72 hours   Lab Units 05/19/24  0424 05/17/24  0901 05/16/24  2113   WBC AUTO x10*3/uL 5.5   < > 11.2   HEMOGLOBIN g/dL 10.1*   < > 13.4*   HEMATOCRIT % 31.3*   < > 40.9*  "  PLATELETS AUTO x10*3/uL 135*   < > 197   NEUTROS PCT AUTO %  --   --  91.3   LYMPHS PCT AUTO %  --   --  5.1   MONOS PCT AUTO %  --   --  2.9   EOS PCT AUTO %  --   --  0.1    < > = values in this interval not displayed.     Results from last 72 hours   Lab Units 05/19/24  0424   SODIUM mmol/L 134*   POTASSIUM mmol/L 4.3   CHLORIDE mmol/L 100   CO2 mmol/L 27   BUN mg/dL 21   CREATININE mg/dL 1.32*   GLUCOSE mg/dL 92   CALCIUM mg/dL 8.5*   ANION GAP mmol/L 11   EGFR mL/min/1.73m*2 53*     Results from last 72 hours   Lab Units 05/17/24  1915   ALK PHOS U/L 60   BILIRUBIN TOTAL mg/dL 0.7   PROTEIN TOTAL g/dL 6.8   ALT U/L 12   AST U/L 17   ALBUMIN g/dL 3.6     Estimated Creatinine Clearance: 49 mL/min (A) (by C-G formula based on SCr of 1.32 mg/dL (H)).  C-Reactive Protein   Date/Time Value Ref Range Status   05/17/2024 07:15 PM 30.46 (H) <1.00 mg/dL Final     Sedimentation Rate   Date/Time Value Ref Range Status   05/17/2024 07:15 PM 49 (H) 0 - 20 mm/h Final     No results found for: \"HIV1X2\", \"HIVCONF\", \"NMLRLZ1QS\"  No results found for: \"HCVPCRQUANT\"  Cultures  No results found for the last 14 days.         Assessment/Plan             Right lower lobe pneumonia     Diverticulitis     Patient having persistent fevers patient has only been receiving antibiotics for 2 days  Diverticulitis does not appear to be bad there is no evidence of perforation or abscess formation but CT scan might miss an early perforation that may be generating fevers     Vancomycin for Staphylococcus and pneumococcus azithromycin for the possibility of atypical organisms meropenem for gram-negative's and aspiration           Consolidation lung certainly could be cause for fevers pneumonia probably have a difficult time for clearing given the extensive involvement of cancer from mesothelioma     Agree with current antibiotic coverage                "

## 2024-05-19 NOTE — PROGRESS NOTES
[facility] Critical Care Medicine       Date:  5/19/2024  Patient:  Vinicius Arriola  YOB: 1935  MRN:  [id number]   Admit Date:  5/16/2024  ========================================================================================================    Chief Complaint   Patient presents with    Weakness, Gen     Mesothelioma.  Last chemo on Monday.  Increased weakness since Monday.  No other c/o         History of Present Illness:  Vinicius Arriola is a 88 y.o. 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 Rothman ([facility]), JOVANNI, basal cell ca on face, s/p removal and malignant mesothelioma, S/p L VATS with decort 5/2022  c/b post op ileus, XRT 9-10/2022    Presented to ED after chemo around last week with complaints of nausea vomiting and diarrhea.  Patient was admitted to medical floor.  Noted to have acute urinary retention status post Forrest placement by urology, and became hypotensive overnight, patient was minimally responsive after receiving IVF.  Hospitalist consulted ICU for hypotension.  Upon ICU evaluation patient was hypotensive, alert and oriented x 3, complains of nausea, vomiting, diarrhea, did have distended abdomen and TTP in LLQ.  On bedside ultrasound with collapsible IVC, patient was given 1.5 L LR bolus and will be transferred to ICU and repeat CT CAP with contrast was ordered.      CT shows known left lung collapse due to mesothelioma, but new left posterior chest wall invasion noted in 9-11th rib spaces, right lower lobe infiltration with pleural effusion and atelectasis, and new concern for left pulmonary trunk tumor thrombus.      Interval ICU Events:  5/18: Hypotension overnight and febrile, transferred to ICU.  Consult oncology at [facility] d/t  CT results showing involvement of left pulmonary artery.  Treatment for sepsis (pneumonia) and hypovolemia with azithromycin, filiberto  and vanco.  Follow cultures, atypical antigens.    5/19: No acute events overnight.  This morning drowsy but awakens to answer questions follow commands.  Hemodynamically stable after volume resuscitation.  MRSA nares negative, Legionella antigen negative, will discontinue vancomycin and azithromycin today.  Continue Meropenem for broad-spectrum coverage of possible UTI as well as RLL pneumonia.  Awaiting telemetry bed at [facility].      Medical History:  Past Medical History:   Diagnosis Date    Acute embolism and thrombosis of unspecified deep veins of unspecified lower extremity (Multi)     Acute embolism and thrombosis of unspecified deep veins of unspecified lower extremity    Dental disease     Upper and lower dentures    Encounter for preprocedural cardiovascular examination 11/02/2021    Pre-operative cardiovascular examination    Obesity, unspecified 07/15/2022    Class 2 obesity with body mass index (BMI) of 38.0 to 38.9 in adult    Personal history of diseases of the blood and blood-forming organs and certain disorders involving the immune mechanism     History of polycythemia    Personal history of other diseases of male genital organs     History of benign prostatic hyperplasia    Personal history of other diseases of the circulatory system     History of hypertension    Personal history of other diseases of the circulatory system     History of cardiac arrhythmia    Personal history of other diseases of the circulatory system 10/21/2021    History of abnormal electrocardiography    Personal history of other diseases of the circulatory system     History of essential hypertension    Personal history of other diseases of the nervous system and sense organs     History of sleep apnea    Personal history of other malignant neoplasm of skin     History of malignant neoplasm of skin    Personal history of other specified conditions     History of balance disorder    Personal history of other  venous thrombosis and embolism     History of deep venous thrombosis     Past Surgical History:   Procedure Laterality Date    OTHER SURGICAL HISTORY  08/20/2019    Hernia repair    OTHER SURGICAL HISTORY  08/20/2019    Tonsillectomy with adenoidectomy    OTHER SURGICAL HISTORY  08/20/2019    Cataract surgery    OTHER SURGICAL HISTORY  06/10/2022    Pulmonary decortication    OTHER SURGICAL HISTORY  11/02/2021    Knee replacement    OTHER SURGICAL HISTORY  01/02/2020    Cardioversion    OTHER SURGICAL HISTORY  01/02/2020    Finger surgical procedure    OTHER SURGICAL HISTORY  05/13/2022    Cardiac catheterization with stent placement    OTHER SURGICAL HISTORY  05/26/2022    Pulmonary decortication    OTHER SURGICAL HISTORY  10/21/2021    Back surgery    OTHER SURGICAL HISTORY  11/02/2021    Colonoscopy    OTHER SURGICAL HISTORY  11/02/2021    Inguinal hernia repair    OTHER SURGICAL HISTORY  11/02/2021    Trigger finger repair    OTHER SURGICAL HISTORY  11/02/2021    Umbilical hernia repair    OTHER SURGICAL HISTORY  11/04/2021    Carpal tunnel surgery     Medications Prior to Admission   Medication Sig Dispense Refill Last Dose    ascorbic acid (Vitamin C) 1,000 mg tablet Take 1 tablet (1,000 mg) by mouth once daily.   5/16/2024 at a.m.    aspirin 81 mg EC tablet Take 1 tablet (81 mg) by mouth once daily at bedtime.   Past Week at p.m.    carboxymethylcellulose (Refresh Celluvisc) 1 % ophthalmic solution dropperette Administer 1 drop into affected eye(s) once daily in the morning.   5/16/2024 at a.m.    ferrous sulfate 325 (65 Fe) MG EC tablet Take 65 mg by mouth 2 times a day with meals. Do not crush, chew, or split.   5/16/2024 at a.m.    furosemide (Lasix) 20 mg tablet Take 1 tablet (20 mg) by mouth once daily.   5/16/2024 at a.m.    melatonin 5 mg tablet Take 1 tablet (5 mg) by mouth as needed at bedtime.   Past Week at p,m,    metoprolol tartrate (Lopressor) 25 mg tablet Take 1 tablet (25 mg) by mouth 2 times  a day.   2024 at a.m.    omega-3 fatty acids-fish oil 360-1,200 mg capsule Take 1 capsule (1,200 mg) by mouth twice a day.   2024 at a.m.    potassium chloride CR (Klor-Con) 10 mEq ER tablet Take 1 tablet (10 mEq) by mouth once daily. 90 tablet 3 2024 at a.m.    promethazine-DM (Phenergan-DM) 6.25-15 mg/5 mL syrup Take 1.25 mL by mouth 4 times a day as needed for cough.   Past Week    rosuvastatin (Crestor) 10 mg tablet Take 1 tablet (10 mg) by mouth once daily.   Past Week at p,m,    traZODone (Desyrel) 50 mg tablet Take 2 tablets (100 mg) by mouth once daily at bedtime.   Past Week at p.m.    ZINC ORAL Take 1 tablet by mouth once daily.   2024 at a.m.    ferrous sulfate, 325 mg ferrous sulfate, tablet TAKE 1 TABLET BY MOUTH TWICE A DAY WITH MEALS 180 tablet 3     nitroglycerin (Nitrostat) 0.4 mg SL tablet Place under the tongue every 5 minutes if needed for chest pain (up to 3 doses).   Unknown    warfarin (Coumadin) 1 mg tablet Take 1-2 tablets daily or as directed per [facility] 90 tablet 1     warfarin (Coumadin) 2 mg tablet Take 1 tablet (2 mg) by mouth see administration instructions. Take 1 tablet daily or as directed per [facility] 90 tablet 0     warfarin (Coumadin) 4 mg tablet TAKE AS DIRECTED Per [facility] Protime Department 90 tablet 0      Benadryl allergy decongestant, Diphenhydramine, and Diphenhydramine hcl  Social History     Tobacco Use    Smoking status: Former     Current packs/day: 0.00     Average packs/day: 1 pack/day for 10.0 years (10.0 ttl pk-yrs)     Types: Cigarettes     Start date:      Quit date:      Years since quittin.4    Smokeless tobacco: Never   Vaping Use    Vaping status: Never Used   Substance Use Topics    Alcohol use: Yes     Alcohol/week: 4.0 standard drinks of alcohol     Types: 4 Cans of beer per week     Comment: 4 beers weekly    Drug use: Never     Family History   Problem Relation Name Age of Onset    Accidental  death Mother      Coronary artery disease Mother      Other (Cardiac disorder) Father      Dementia Father      Cancer Father      Colon cancer Daughter         Review of Systems:  14 point review of systems was completed and negative except for those specially mention in my HPI    Physical Exam:    Heart Rate:  [70-98]   Temp:  [37 °C (98.6 °F)-38.6 °C (101.5 °F)]   Resp:  [21-37]   BP: ()/(43-69)   Weight:  [113 kg (249 lb 9 oz)]   SpO2:  [87 %-99 %]     Physical Exam  Vitals reviewed.   Constitutional:       General: He is not in acute distress.     Appearance: He is ill-appearing. He is not toxic-appearing or diaphoretic.   HENT:      Head: Normocephalic.      Mouth/Throat:      Mouth: Mucous membranes are moist.      Pharynx: Oropharynx is clear.   Eyes:      Extraocular Movements: Extraocular movements intact.      Conjunctiva/sclera: Conjunctivae normal.      Pupils: Pupils are equal, round, and reactive to light.   Cardiovascular:      Rate and Rhythm: Normal rate and regular rhythm.      Pulses: Normal pulses.      Heart sounds: Normal heart sounds.   Pulmonary:      Comments: Up to 4 L nasal cannula, expiratory wheezing, diminished left side and RLL  Abdominal:      Tenderness: There is abdominal tenderness.      Comments: TTP left lower quadrant, hyperactive bowel sounds, obese but distended, soft   Musculoskeletal:         General: No swelling. Normal range of motion.   Skin:     General: Skin is warm and dry.      Capillary Refill: Capillary refill takes less than 2 seconds.   Neurological:      General: No focal deficit present.      Mental Status: He is alert and oriented to person, place, and time. Mental status is at baseline.      Motor: Weakness present.   Psychiatric:         Mood and Affect: Mood normal.         Thought Content: Thought content normal.         Judgment: Judgment normal.         Objective:    I have reviewed all medications, laboratory results, and imaging pertinent for  today's encounter    Assessment/Plan:    I am currently managing this critically ill patient for the following problems:    Neuro/Psych/Pain Ctrl/Sedation:  #Fever in setting of Possible Infection  -- Neuro checks, CAM assessment, delirium precautions  -- Infectious workup and management as per ID section  -- Tylenol as needed for mild pain or fever    Respiratory/ENT:  #Acute Hypoxic Respiratory Insufficiency  #Mesothelioma with Loculated Pleural Effusions   #Left Hilar Mass, Left Pulmonary Trunk Mass  #History JOVANNI, Pulmonary Embolism  CT chest 5/18 with significant collapse of left lung, loculated pleural effusions consistent with patient's known mesothelioma.  Also shows ill-defined patchy infiltrate in right lower lobe.  Shows left hilar mass with extension into left pulmonary trunk likely tumor thrombus  -- DuoNebs every 4 hours  -- Encourage pulmonary hygiene  -- Infectious management as per ID section  -- On 4L nasal cannula, wean supplemental O2 for SpO2 goal >90%    Cardiovascular:  #History Afib, HLD, CAD, PAD  #History DVT/PE on Warfarin  Hypotensive 5/18 requiring transfer to ICU, hypotension resolved with IVF resuscitation  -- Telemetry monitoring  -- Maintenance IV with LR at 100 mL/h  -- Continue home metoprolol 25 mg twice daily  -- Continue home aspirin, statin, warfarin  -- Keep MAP >60    GI:  #Diarrhea  -- Regular diet  -- FMS in place due to large amounts of diarrhea and excoriation  -- Add Imodium 2 mg 4 times daily,  remove FMS 5/20  -- As needed Zofran for nausea    Renal/Volume Status (Intra & Extravascular):  #Acute Urinary Retention, s/p Boyd Catheter Placement by Urology  -- Added Flomax and Finasteride  -- Will add dedicated prostate ultrasound when out of ICU per Urology  -- Attempt boyd removal and void trial 5/20  -- Urology following    Endocrine  No active issues  -- Monitor for signs and symptoms of hypo and hyperglycemia    Infectious Disease:  #Sepsis  Likely secondary to  pneumonia vs UTI  -- CT Chest with RLL infiltrate  -- UA 5/18 with small LE, 3+ bacteria  -- MRSA nares: negative  -- Strep/Legionella antigens: negative  -- Blood cultures 5/16: NGTD  -- Urine culture 5/18: pending  -- Azithromycin vancomycin discontinued, continue meropenem for broad-spectrum coverage, de-escalate as cultures allow  -- ID following, appreciate further input    Heme/Onc:  #Mesothelioma   Malignant Pleural Mesothelioma, epithelioid type  Follows with  Oncology Dr. Ann  Prior Therapy:     -- L VATS with pulmonary decortication 5/2022     -- XRT 9/30/22 - 10/6/22    -- Patient recently had visit with Dr. Ann on 5/13, given his recent PET scan shows progression of disease he opted to initiate chemotherapy  -- CT chest findings this admission show advancement of disease with possible tumor thrombus in left pulmonary trunk   -- Patient accepted for transfer under Dr. Dr. Raúl Willis at [facility] on telemetry floor      OBGYN/MSK:  -- PT OT when appropriate    Ethics/Code Status:  Full code    :  DVT Prophylaxis:  coumadin  GI Prophylaxis: N/A  Bowel Regimen: N/A  Diet: Regular  CVC: N/A  [place]: N/A  Forrest: Yes-retention  FMS: Yes-diarrhea  Restraints: N/A  Dispo: Pending telemetry transfer to [facility]    SULEMA Key-CNP  Pulmonary & Critical Care Medicine  [facility]

## 2024-05-19 NOTE — DISCHARGE SUMMARY
Discharge Diagnosis  Sepsis, due to unspecified organism, unspecified whether acute organ dysfunction present (Multi)    Issues Requiring Follow-Up  Transfer to [facility] for Heme/Onc consult - follow up plans for mesothelioma, tumor invasion of left pulmonary trunk  On Meropenem for abx coverage - UTI as well as possible pneumonia, follow cultures    Test Results Pending At Discharge  Pending Labs       Order Current Status    Respiratory Viral Panel In process    Urine culture In process    Blood Culture Preliminary result    Blood Culture Preliminary result    Blood Culture Preliminary result            Hospital Course  Vinicius Arriola is a 88 y.o. 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 Rothman ([facility]), JOVANNI, basal cell ca on face, s/p removal and malignant mesothelioma, S/p L VATS with decort 5/2022  c/b post op ileus, XRT 9-10/2022    Presented to ED after chemo around last week with complaints of nausea vomiting and diarrhea.  Patient was admitted to medical floor.  Noted to have acute urinary retention status post Forrest placement by urology, and became hypotensive overnight, patient was minimally responsive after receiving IVF.  Hospitalist consulted ICU for hypotension.  Upon ICU evaluation patient was hypotensive, alert and oriented x 3, complains of nausea, vomiting, diarrhea, did have distended abdomen and TTP in LLQ.  On bedside ultrasound with collapsible IVC, patient was given 1.5 L LR bolus and will be transferred to ICU and repeat CT CAP with contrast was ordered.       CT shows known left lung collapse due to mesothelioma, but new left posterior chest wall invasion noted in 9-11th rib spaces, right lower lobe infiltration with pleural effusion and atelectasis, and new concern for left pulmonary trunk tumor thrombus.        Interval ICU Events:  5/18: Hypotension overnight and  febrile, transferred to ICU.  Consult oncology at [facility] d/t  CT results showing involvement of left pulmonary artery.  Treatment for sepsis (pneumonia) and hypovolemia with azithromycin, fiilberto and vanco.  Follow cultures, atypical antigens.     5/19: No acute events overnight.  This morning drowsy but awakens to answer questions follow commands.  Hemodynamically stable after volume resuscitation.  MRSA nares negative, Legionella antigen negative, will discontinue vancomycin and azithromycin today.  Continue Meropenem for broad-spectrum coverage of possible UTI as well as RLL pneumonia.  Accepted and transferred to [facility].    Pertinent Physical Exam At Time of Discharge  Physical Exam  Vitals reviewed.   Constitutional:       General: He is not in acute distress.     Appearance: He is ill-appearing. He is not toxic-appearing or diaphoretic.   HENT:      Head: Normocephalic.      Mouth/Throat:      Mouth: Mucous membranes are moist.      Pharynx: Oropharynx is clear.   Eyes:      Extraocular Movements: Extraocular movements intact.      Conjunctiva/sclera: Conjunctivae normal.      Pupils: Pupils are equal, round, and reactive to light.   Cardiovascular:      Rate and Rhythm: Normal rate and regular rhythm.      Pulses: Normal pulses.      Heart sounds: Normal heart sounds.   Pulmonary:      Comments: Up to 4 L nasal cannula, expiratory wheezing, diminished left side and RLL  Abdominal:      Tenderness: There is abdominal tenderness.      Comments: TTP left lower quadrant, hyperactive bowel sounds, obese but distended, soft   Musculoskeletal:         General: No swelling. Normal range of motion.   Skin:     General: Skin is warm and dry.      Capillary Refill: Capillary refill takes less than 2 seconds.   Neurological:      General: No focal deficit present.      Mental Status: He is alert and oriented to person, place, and time. Mental status is at baseline.      Motor: Weakness present.   Psychiatric:          Mood and Affect: Mood normal.         Thought Content: Thought content normal.         Judgment: Judgment normal.     Home Medications     Medication List      ASK your doctor about these medications     ascorbic acid 1,000 mg tablet; Commonly known as: Vitamin C   aspirin 81 mg EC tablet   carboxymethylcellulose 1 % ophthalmic solution dropperette; Commonly   known as: Refresh Celluvisc   * ferrous sulfate (325 mg ferrous sulfate) tablet; TAKE 1 TABLET BY   MOUTH TWICE A DAY WITH MEALS   * ferrous sulfate 325 (65 Fe) MG EC tablet   furosemide 20 mg tablet; Commonly known as: Lasix   melatonin 5 mg tablet   metoprolol tartrate 25 mg tablet; Commonly known as: Lopressor   nitroglycerin 0.4 mg SL tablet; Commonly known as: Nitrostat   omega-3 fatty acids-fish oil 360-1,200 mg capsule   potassium chloride CR 10 mEq ER tablet; Commonly known as: Klor-Con;   Take 1 tablet (10 mEq) by mouth once daily.   promethazine-DM 6.25-15 mg/5 mL syrup; Commonly known as: Phenergan-DM   rosuvastatin 10 mg tablet; Commonly known as: Crestor   traZODone 50 mg tablet; Commonly known as: Desyrel   * warfarin 4 mg tablet; Commonly known as: Coumadin; Take as directed.   If you are unsure how to take this medication, talk to your nurse or   doctor.; Original instructions: TAKE AS DIRECTED Per [facility]   Protime Department   * warfarin 2 mg tablet; Commonly known as: Coumadin; Take as directed.   If you are unsure how to take this medication, talk to your nurse or   doctor.; Original instructions: Take 1 tablet (2 mg) by mouth see   administration instructions. Take 1 tablet daily or as directed per [facility]   * warfarin 1 mg tablet; Commonly known as: Coumadin; Take as directed.   If you are unsure how to take this medication, talk to your nurse or   doctor.; Original instructions: Take 1-2 tablets daily or as directed per   [facility]   ZINC ORAL  * This list has 5 medication(s) that are the same as other  medications   prescribed for you. Read the directions carefully, and ask your doctor or   other care provider to review them with you.       Outpatient Follow-Up  Future Appointments   Date Time Provider Department [place]   6/6/2024  1:30 PM ALONDRA Izaguirre [id number] [place]   8/16/2024  1:00 PM Tina Henderson MD [id number] [place]       SULEMA Key-CNP

## 2024-05-20 LAB
ABO GROUP (TYPE) IN BLOOD: NORMAL
ALBUMIN SERPL BCP-MCNC: 2.7 G/DL (ref 3.4–5)
ALP SERPL-CCNC: 51 U/L (ref 33–136)
ALT SERPL W P-5'-P-CCNC: 25 U/L (ref 10–52)
ANION GAP SERPL CALC-SCNC: 16 MMOL/L (ref 10–20)
ANTIBODY SCREEN: NORMAL
APTT PPP: 40 SECONDS (ref 27–38)
AST SERPL W P-5'-P-CCNC: 48 U/L (ref 9–39)
BASOPHILS # BLD MANUAL: 0 X10*3/UL (ref 0–0.1)
BASOPHILS NFR BLD MANUAL: 0 %
BILIRUB DIRECT SERPL-MCNC: 0.1 MG/DL (ref 0–0.3)
BILIRUB SERPL-MCNC: 0.4 MG/DL (ref 0–1.2)
BUN SERPL-MCNC: 24 MG/DL (ref 6–23)
BURR CELLS BLD QL SMEAR: ABNORMAL
C DIF TOX TCDA+TCDB STL QL NAA+PROBE: NOT DETECTED
CALCIUM SERPL-MCNC: 8.8 MG/DL (ref 8.6–10.6)
CHLORIDE SERPL-SCNC: 100 MMOL/L (ref 98–107)
CO2 SERPL-SCNC: 25 MMOL/L (ref 21–32)
CREAT SERPL-MCNC: 1.37 MG/DL (ref 0.5–1.3)
CRP SERPL-MCNC: 42.36 MG/DL
EGFRCR SERPLBLD CKD-EPI 2021: 50 ML/MIN/1.73M*2
EOSINOPHIL # BLD MANUAL: 0.02 X10*3/UL (ref 0–0.4)
EOSINOPHIL NFR BLD MANUAL: 1.7 %
ERYTHROCYTE [DISTWIDTH] IN BLOOD BY AUTOMATED COUNT: 17 % (ref 11.5–14.5)
GLUCOSE SERPL-MCNC: 80 MG/DL (ref 74–99)
HCT VFR BLD AUTO: 33.2 % (ref 41–52)
HGB BLD-MCNC: 9.9 G/DL (ref 13.5–17.5)
IMM GRANULOCYTES # BLD AUTO: 0.18 X10*3/UL (ref 0–0.5)
IMM GRANULOCYTES NFR BLD AUTO: 14.3 % (ref 0–0.9)
INR PPP: 3.4 (ref 0.9–1.1)
LYMPHOCYTES # BLD MANUAL: 0.28 X10*3/UL (ref 0.8–3)
LYMPHOCYTES NFR BLD MANUAL: 21.8 %
MAGNESIUM SERPL-MCNC: 2.21 MG/DL (ref 1.6–2.4)
MCH RBC QN AUTO: 27.5 PG (ref 26–34)
MCHC RBC AUTO-ENTMCNC: 29.8 G/DL (ref 32–36)
MCV RBC AUTO: 92 FL (ref 80–100)
MONOCYTES # BLD MANUAL: 0.02 X10*3/UL (ref 0.05–0.8)
MONOCYTES NFR BLD MANUAL: 1.7 %
NEUTS SEG # BLD MANUAL: 0.95 X10*3/UL (ref 1.6–5)
NEUTS SEG NFR BLD MANUAL: 73.1 %
NRBC BLD-RTO: 0 /100 WBCS (ref 0–0)
OVALOCYTES BLD QL SMEAR: ABNORMAL
PHOSPHATE SERPL-MCNC: 2.4 MG/DL (ref 2.5–4.9)
PLATELET # BLD AUTO: 107 X10*3/UL (ref 150–450)
POTASSIUM SERPL-SCNC: 4.1 MMOL/L (ref 3.5–5.3)
PROCALCITONIN SERPL-MCNC: 0.6 NG/ML
PROT SERPL-MCNC: 5.8 G/DL (ref 6.4–8.2)
PROTHROMBIN TIME: 39.4 SECONDS (ref 9.8–12.8)
RBC # BLD AUTO: 3.6 X10*6/UL (ref 4.5–5.9)
RBC MORPH BLD: ABNORMAL
RH FACTOR (ANTIGEN D): NORMAL
SODIUM SERPL-SCNC: 137 MMOL/L (ref 136–145)
TOTAL CELLS COUNTED BLD: 115
VARIANT LYMPHS # BLD MANUAL: 0.02 X10*3/UL (ref 0–0.3)
VARIANT LYMPHS NFR BLD: 1.7 %
WBC # BLD AUTO: 1.3 X10*3/UL (ref 4.4–11.3)

## 2024-05-20 PROCEDURE — A4217 STERILE WATER/SALINE, 500 ML: HCPCS

## 2024-05-20 PROCEDURE — 36415 COLL VENOUS BLD VENIPUNCTURE: CPT

## 2024-05-20 PROCEDURE — 86140 C-REACTIVE PROTEIN: CPT

## 2024-05-20 PROCEDURE — 2500000004 HC RX 250 GENERAL PHARMACY W/ HCPCS (ALT 636 FOR OP/ED)

## 2024-05-20 PROCEDURE — 86901 BLOOD TYPING SEROLOGIC RH(D): CPT

## 2024-05-20 PROCEDURE — 84145 PROCALCITONIN (PCT): CPT

## 2024-05-20 PROCEDURE — 2500000006 HC RX 250 W HCPCS SELF ADMINISTERED DRUGS (ALT 637 FOR ALL PAYERS): Mod: MUE

## 2024-05-20 PROCEDURE — 97165 OT EVAL LOW COMPLEX 30 MIN: CPT | Mod: GO

## 2024-05-20 PROCEDURE — 84100 ASSAY OF PHOSPHORUS: CPT

## 2024-05-20 PROCEDURE — 85007 BL SMEAR W/DIFF WBC COUNT: CPT

## 2024-05-20 PROCEDURE — 97530 THERAPEUTIC ACTIVITIES: CPT | Mod: GO

## 2024-05-20 PROCEDURE — 86900 BLOOD TYPING SEROLOGIC ABO: CPT | Mod: 91

## 2024-05-20 PROCEDURE — 2500000005 HC RX 250 GENERAL PHARMACY W/O HCPCS

## 2024-05-20 PROCEDURE — 2500000001 HC RX 250 WO HCPCS SELF ADMINISTERED DRUGS (ALT 637 FOR MEDICARE OP)

## 2024-05-20 PROCEDURE — 1170000001 HC PRIVATE ONCOLOGY ROOM DAILY

## 2024-05-20 PROCEDURE — 82248 BILIRUBIN DIRECT: CPT

## 2024-05-20 PROCEDURE — 87493 C DIFF AMPLIFIED PROBE: CPT

## 2024-05-20 PROCEDURE — 85027 COMPLETE CBC AUTOMATED: CPT

## 2024-05-20 PROCEDURE — 87506 IADNA-DNA/RNA PROBE TQ 6-11: CPT

## 2024-05-20 PROCEDURE — 86923 COMPATIBILITY TEST ELECTRIC: CPT

## 2024-05-20 PROCEDURE — 2500000004 HC RX 250 GENERAL PHARMACY W/ HCPCS (ALT 636 FOR OP/ED): Performed by: STUDENT IN AN ORGANIZED HEALTH CARE EDUCATION/TRAINING PROGRAM

## 2024-05-20 PROCEDURE — 2500000002 HC RX 250 W HCPCS SELF ADMINISTERED DRUGS (ALT 637 FOR MEDICARE OP, ALT 636 FOR OP/ED)

## 2024-05-20 PROCEDURE — 83735 ASSAY OF MAGNESIUM: CPT

## 2024-05-20 PROCEDURE — 87040 BLOOD CULTURE FOR BACTERIA: CPT

## 2024-05-20 PROCEDURE — 99233 SBSQ HOSP IP/OBS HIGH 50: CPT

## 2024-05-20 PROCEDURE — 87075 CULTR BACTERIA EXCEPT BLOOD: CPT | Mod: 91

## 2024-05-20 PROCEDURE — 85610 PROTHROMBIN TIME: CPT

## 2024-05-20 PROCEDURE — 94640 AIRWAY INHALATION TREATMENT: CPT

## 2024-05-20 RX ORDER — ACETAMINOPHEN 325 MG/1
650 TABLET ORAL EVERY 4 HOURS PRN
Status: DISCONTINUED | OUTPATIENT
Start: 2024-05-20 | End: 2024-05-22

## 2024-05-20 RX ORDER — FUROSEMIDE 10 MG/ML
40 INJECTION INTRAMUSCULAR; INTRAVENOUS ONCE
Status: COMPLETED | OUTPATIENT
Start: 2024-05-20 | End: 2024-05-20

## 2024-05-20 RX ORDER — VANCOMYCIN HYDROCHLORIDE 1 G/20ML
INJECTION, POWDER, LYOPHILIZED, FOR SOLUTION INTRAVENOUS DAILY PRN
Status: DISCONTINUED | OUTPATIENT
Start: 2024-05-20 | End: 2024-05-25

## 2024-05-20 RX ORDER — ACETAMINOPHEN 160 MG/5ML
650 SOLUTION ORAL EVERY 4 HOURS PRN
Status: DISCONTINUED | OUTPATIENT
Start: 2024-05-20 | End: 2024-05-22

## 2024-05-20 RX ORDER — ACETAMINOPHEN 650 MG/1
650 SUPPOSITORY RECTAL EVERY 4 HOURS PRN
Status: DISCONTINUED | OUTPATIENT
Start: 2024-05-20 | End: 2024-05-22

## 2024-05-20 RX ORDER — TAMSULOSIN HYDROCHLORIDE 0.4 MG/1
0.4 CAPSULE ORAL DAILY
Status: DISCONTINUED | OUTPATIENT
Start: 2024-05-20 | End: 2024-06-11 | Stop reason: HOSPADM

## 2024-05-20 RX ORDER — DICLOFENAC SODIUM 10 MG/G
4 GEL TOPICAL 4 TIMES DAILY PRN
Status: DISCONTINUED | OUTPATIENT
Start: 2024-05-20 | End: 2024-06-11 | Stop reason: HOSPADM

## 2024-05-20 RX ORDER — GUAIFENESIN 600 MG/1
600 TABLET, EXTENDED RELEASE ORAL 2 TIMES DAILY PRN
Status: DISCONTINUED | OUTPATIENT
Start: 2024-05-20 | End: 2024-05-24

## 2024-05-20 RX ADMIN — METOPROLOL TARTRATE 25 MG: 25 TABLET, FILM COATED ORAL at 10:15

## 2024-05-20 RX ADMIN — FERROUS SULFATE TAB 325 MG (65 MG ELEMENTAL FE) 1 TABLET: 325 (65 FE) TAB at 20:43

## 2024-05-20 RX ADMIN — IPRATROPIUM BROMIDE AND ALBUTEROL SULFATE 3 ML: .5; 3 SOLUTION RESPIRATORY (INHALATION) at 21:15

## 2024-05-20 RX ADMIN — ONDANSETRON 4 MG: 2 INJECTION INTRAMUSCULAR; INTRAVENOUS at 22:19

## 2024-05-20 RX ADMIN — VANCOMYCIN HYDROCHLORIDE 1250 MG: 5 INJECTION, POWDER, LYOPHILIZED, FOR SOLUTION INTRAVENOUS at 18:05

## 2024-05-20 RX ADMIN — IPRATROPIUM BROMIDE AND ALBUTEROL SULFATE 3 ML: .5; 3 SOLUTION RESPIRATORY (INHALATION) at 15:57

## 2024-05-20 RX ADMIN — MEROPENEM 1 G: 1 INJECTION, POWDER, FOR SOLUTION INTRAVENOUS at 10:13

## 2024-05-20 RX ADMIN — IPRATROPIUM BROMIDE AND ALBUTEROL SULFATE 3 ML: .5; 3 SOLUTION RESPIRATORY (INHALATION) at 10:36

## 2024-05-20 RX ADMIN — GUAIFENESIN 600 MG: 600 TABLET ORAL at 01:56

## 2024-05-20 RX ADMIN — NYSTATIN 1 APPLICATION: 100000 POWDER TOPICAL at 20:51

## 2024-05-20 RX ADMIN — FUROSEMIDE 40 MG: 10 INJECTION, SOLUTION INTRAMUSCULAR; INTRAVENOUS at 13:20

## 2024-05-20 RX ADMIN — Medication 5 L/MIN: at 08:00

## 2024-05-20 RX ADMIN — OXYCODONE HYDROCHLORIDE AND ACETAMINOPHEN 1000 MG: 500 TABLET ORAL at 10:15

## 2024-05-20 RX ADMIN — TAMSULOSIN HYDROCHLORIDE 0.4 MG: 0.4 CAPSULE ORAL at 18:05

## 2024-05-20 RX ADMIN — METOPROLOL TARTRATE 25 MG: 25 TABLET, FILM COATED ORAL at 20:42

## 2024-05-20 RX ADMIN — TRAZODONE HYDROCHLORIDE 100 MG: 50 TABLET ORAL at 20:42

## 2024-05-20 RX ADMIN — IPRATROPIUM BROMIDE AND ALBUTEROL SULFATE 3 ML: .5; 3 SOLUTION RESPIRATORY (INHALATION) at 00:55

## 2024-05-20 RX ADMIN — FERROUS SULFATE TAB 325 MG (65 MG ELEMENTAL FE) 1 TABLET: 325 (65 FE) TAB at 10:14

## 2024-05-20 RX ADMIN — SODIUM PHOSPHATE, MONOBASIC, MONOHYDRATE AND SODIUM PHOSPHATE, DIBASIC, ANHYDROUS 15 MMOL: 142; 276 INJECTION, SOLUTION INTRAVENOUS at 20:42

## 2024-05-20 RX ADMIN — PIPERACILLIN SODIUM AND TAZOBACTAM SODIUM 4.5 G: 4; .5 INJECTION, SOLUTION INTRAVENOUS at 22:19

## 2024-05-20 RX ADMIN — FUROSEMIDE 40 MG: 10 INJECTION, SOLUTION INTRAMUSCULAR; INTRAVENOUS at 03:34

## 2024-05-20 RX ADMIN — ROSUVASTATIN CALCIUM 10 MG: 10 TABLET, FILM COATED ORAL at 20:42

## 2024-05-20 RX ADMIN — IPRATROPIUM BROMIDE AND ALBUTEROL SULFATE 3 ML: .5; 3 SOLUTION RESPIRATORY (INHALATION) at 23:49

## 2024-05-20 RX ADMIN — ASPIRIN 81 MG: 81 TABLET, COATED ORAL at 20:43

## 2024-05-20 RX ADMIN — ACETAMINOPHEN 650 MG: 325 TABLET ORAL at 18:51

## 2024-05-20 RX ADMIN — IPRATROPIUM BROMIDE AND ALBUTEROL SULFATE 3 ML: .5; 3 SOLUTION RESPIRATORY (INHALATION) at 03:42

## 2024-05-20 RX ADMIN — PIPERACILLIN SODIUM AND TAZOBACTAM SODIUM 4.5 G: 4; .5 INJECTION, SOLUTION INTRAVENOUS at 16:49

## 2024-05-20 RX ADMIN — Medication 5 L/MIN: at 20:00

## 2024-05-20 ASSESSMENT — PAIN SCALES - GENERAL
PAINLEVEL_OUTOF10: 0 - NO PAIN
PAINLEVEL_OUTOF10: 0 - NO PAIN

## 2024-05-20 ASSESSMENT — COGNITIVE AND FUNCTIONAL STATUS - GENERAL
DRESSING REGULAR LOWER BODY CLOTHING: A LOT
PERSONAL GROOMING: A LITTLE
HELP NEEDED FOR BATHING: A LOT
EATING MEALS: A LITTLE
DRESSING REGULAR UPPER BODY CLOTHING: A LOT
TOILETING: TOTAL
DAILY ACTIVITIY SCORE: 13

## 2024-05-20 ASSESSMENT — ACTIVITIES OF DAILY LIVING (ADL): BATHING_ASSISTANCE: MAXIMAL

## 2024-05-20 ASSESSMENT — PAIN - FUNCTIONAL ASSESSMENT
PAIN_FUNCTIONAL_ASSESSMENT: 0-10
PAIN_FUNCTIONAL_ASSESSMENT: 0-10

## 2024-05-20 NOTE — CONSULTS
"Vancomycin Dosing by Pharmacy- INITIAL    Vinicius Arriola is a 88 y.o. year old male who Pharmacy has been consulted for vancomycin dosing for pneumonia. Based on the patient's indication and renal status this patient will be dosed based on a goal AUC of 400-600.     Renal function is currently stable.    Visit Vitals  /72 (BP Location: Right arm, Patient Position: Lying)   Pulse 100   Temp (!) 38.1 °C (100.5 °F) (Temporal)   Resp 20        Lab Results   Component Value Date    CREATININE 1.37 (H) 05/20/2024    CREATININE 1.32 (H) 05/19/2024    CREATININE 1.30 05/18/2024    CREATININE 1.46 (H) 05/17/2024        Patient weight is No results found for: \"PTWEIGHT\"    No results found for: \"CULTURE\"     I/O last 3 completed shifts:  In: 100 (0.9 mL/kg) [IV Piggyback:100]  Out: 1850 (16.8 mL/kg) [Urine:1850 (0.5 mL/kg/hr)]  Weight: 110 kg   [unfilled]    Lab Results   Component Value Date    PATIENTTEMP  05/18/2024      Comment:      NOTE: Patient Results are Not Corrected for Temperature    PATIENTTEMP 37.0 02/17/2023          Assessment/Plan     Patient will not be given a loading dose.  Will initiate vancomycin maintenance,  1250 mg every 24 hours.    This dosing regimen is predicted by [facility] to result in the following pharmacokinetic parameters:  Loading dose: N/A  Regimen: 1250 mg IV every 24 hours.  Start time: 22:21 on 05/19/2024  Exposure target: AUC24 (range)400-600 mg/L.hr   AUC24,ss: 478 mg/L.hr  Probability of AUC24 > 400: 79 %  Ctrough,ss: 14.5 mg/L  Probability of Ctrough,ss > 20: 15 %  Probability of nephrotoxicity ([place]ise PRAFUL 2009): 10 %    Follow-up level will be ordered on 5/22 at AM unless clinically indicated sooner.  Will continue to monitor renal function daily while on vancomycin and order serum creatinine at least every 48 hours if not already ordered.  Follow for continued vancomycin needs, clinical response, and signs/symptoms of toxicity.       Rivera Martinez, PharmD       "

## 2024-05-20 NOTE — PROGRESS NOTES
"Vinicius Arriola is a 88 y.o. male on day 1 of admission presenting with 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Multi).    Subjective   Overnight, patient had RADAR score 7 due to the following VS: T 37.1 °Celsius; HR 84 ; RR 40; /75; SPO2 93%. No interventions by rapid response team occurred and no acute change in condition.     Patient reports no changes in dyspnea and endorses cough productive of red sputum. Denies nausea, weakness, emesis, and appears otherwise hemodynamically stable. Notes improvement in diarrhea.        Objective   Physical Exam   Gen: well appearing, A+Ox3, in no acute distress  HEENT: EOMI, sclera anicteric, no conjunctival injection, MMM  Resp: CTAB, decrease breath sounds on the left side, crackles on the right side  CV: RRR, no m/r/g. normal S1/S2  GI: Distended, mild tenderness all over, tympanic. no rebound or guarding  Extremities/MSK: warm and well perfused, Frederick LL edema up to knees 2+  Neuro: A+Ox3.  Skin: warm and dry, no lesions, no rashes    Last Recorded Vitals  Blood pressure 118/69, pulse 94, temperature 37.9 °C (100.2 °F), temperature source Temporal, resp. rate (!) 50, height 1.727 m (5' 7.99\"), weight 110 kg (242 lb 8.1 oz), SpO2 95%.  Intake/Output last 3 Shifts:  I/O last 3 completed shifts:  In: 100 (0.9 mL/kg) [IV Piggyback:100]  Out: 1850 (16.8 mL/kg) [Urine:1850 (0.5 mL/kg/hr)]  Weight: 110 kg        Scheduled medications  ascorbic acid, 1,000 mg, oral, Daily  aspirin, 81 mg, oral, Nightly  [START ON 5/26/2024] ergocalciferol, 1,250 mcg, oral, Every Sunday  ferrous sulfate (325 mg ferrous sulfate), 65 mg of iron, oral, BID  ipratropium-albuteroL, 3 mL, nebulization, q4h  metoprolol tartrate, 25 mg, oral, BID  nystatin, 1 Application, Topical, BID  oxygen, , inhalation, Continuous - Inhalation  perflutren lipid microspheres, 0.5-10 mL of dilution, intravenous, Once in imaging  perflutren protein A microsphere, 0.5 mL, intravenous, Once in imaging  piperacillin-tazobactam, 4.5 g, " intravenous, q6h  rosuvastatin, 10 mg, oral, Nightly  sulfur hexafluoride microsphr, 2 mL, intravenous, Once in imaging  traZODone, 100 mg, oral, Nightly  [Held by provider] warfarin, 2 mg, oral, Daily      PRN medications  PRN medications: acetaminophen **OR** acetaminophen **OR** acetaminophen, albuterol, carboxymethylcellulose, diclofenac sodium, guaiFENesin, melatonin, [Held by provider] nitroglycerin, ondansetron **OR** ondansetron     Relevant Results  Results for orders placed or performed during the hospital encounter of 05/19/24 (from the past 24 hour(s))   C-reactive protein   Result Value Ref Range    C-Reactive Protein 42.36 (H) <1.00 mg/dL   Procalcitonin   Result Value Ref Range    Procalcitonin 0.60 (H) <=0.07 ng/mL   Blood Culture    Specimen: Peripheral Venipuncture; Blood culture   Result Value Ref Range    Blood Culture Loaded on Instrument - Culture in progress    Blood Culture    Specimen: Peripheral Venipuncture; Blood culture   Result Value Ref Range    Blood Culture Loaded on Instrument - Culture in progress    CBC and Auto Differential   Result Value Ref Range    WBC 1.3 (L) 4.4 - 11.3 x10*3/uL    nRBC 0.0 0.0 - 0.0 /100 WBCs    RBC 3.60 (L) 4.50 - 5.90 x10*6/uL    Hemoglobin 9.9 (L) 13.5 - 17.5 g/dL    Hematocrit 33.2 (L) 41.0 - 52.0 %    MCV 92 80 - 100 fL    MCH 27.5 26.0 - 34.0 pg    MCHC 29.8 (L) 32.0 - 36.0 g/dL    RDW 17.0 (H) 11.5 - 14.5 %    Platelets 107 (L) 150 - 450 x10*3/uL    Immature Granulocytes %, Automated 14.3 (H) 0.0 - 0.9 %    Immature Granulocytes Absolute, Automated 0.18 0.00 - 0.50 x10*3/uL   Magnesium   Result Value Ref Range    Magnesium 2.21 1.60 - 2.40 mg/dL   Hepatic Function Panel   Result Value Ref Range    Albumin 2.7 (L) 3.4 - 5.0 g/dL    Bilirubin, Total 0.4 0.0 - 1.2 mg/dL    Bilirubin, Direct 0.1 0.0 - 0.3 mg/dL    Alkaline Phosphatase 51 33 - 136 U/L    ALT 25 10 - 52 U/L    AST 48 (H) 9 - 39 U/L    Total Protein 5.8 (L) 6.4 - 8.2 g/dL   Coagulation  Screen   Result Value Ref Range    Protime 39.4 (H) 9.8 - 12.8 seconds    INR 3.4 (H) 0.9 - 1.1    aPTT 40 (H) 27 - 38 seconds   Type And Screen   Result Value Ref Range    ABO TYPE A     Rh TYPE POS     ANTIBODY SCREEN NEG    Phosphorus   Result Value Ref Range    Phosphorus 2.4 (L) 2.5 - 4.9 mg/dL   Basic Metabolic Panel   Result Value Ref Range    Glucose 80 74 - 99 mg/dL    Sodium 137 136 - 145 mmol/L    Potassium 4.1 3.5 - 5.3 mmol/L    Chloride 100 98 - 107 mmol/L    Bicarbonate 25 21 - 32 mmol/L    Anion Gap 16 10 - 20 mmol/L    Urea Nitrogen 24 (H) 6 - 23 mg/dL    Creatinine 1.37 (H) 0.50 - 1.30 mg/dL    eGFR 50 (L) >60 mL/min/1.73m*2    Calcium 8.8 8.6 - 10.6 mg/dL   Manual Differential   Result Value Ref Range    Neutrophils %, Manual 73.1 40.0 - 80.0 %    Lymphocytes %, Manual 21.8 13.0 - 44.0 %    Monocytes %, Manual 1.7 2.0 - 10.0 %    Eosinophils %, Manual 1.7 0.0 - 6.0 %    Basophils %, Manual 0.0 0.0 - 2.0 %    Atypical Lymphocytes %, Manual 1.7 0.0 - 2.0 %    Seg Neutrophils Absolute, Manual 0.95 (L) 1.60 - 5.00 x10*3/uL    Lymphocytes Absolute, Manual 0.28 (L) 0.80 - 3.00 x10*3/uL    Monocytes Absolute, Manual 0.02 (L) 0.05 - 0.80 x10*3/uL    Eosinophils Absolute, Manual 0.02 0.00 - 0.40 x10*3/uL    Basophils Absolute, Manual 0.00 0.00 - 0.10 x10*3/uL    Atypical Lymphs Absolute, Manual 0.02 0.00 - 0.30 x10*3/uL    Total Cells Counted 115     RBC Morphology See Below     Ovalocytes Few     [place] Cells Few       XR abdomen 1 view    Result Date: 5/20/2024  Interpreted By:  Sofya Ahumada, STUDY: XR ABDOMEN 1 VIEW;  5/19/2024 10:42 pm   INDICATION: Signs/Symptoms:abdominal distension.   COMPARISON: CT: 05/18/2024   ACCESSION NUMBER(S): [id number]   ORDERING CLINICIAN: SUN CHAMPAGNE   FINDINGS: Nonobstructive bowel gas pattern. Limited evaluation of pneumoperitoneum on supine imaging, however no gross evidence of free air is noted.   Whiteout throughout the left hemithorax.   Osseous structures  "demonstrate no acute bony changes.       1.  Nonspecific and nonobstructive gas pattern.   MACRO: None   Signed by: Sofya Ahumada 5/20/2024 8:47 AM Dictation workstation:   [id number]      This patient has a urinary catheter   Reason for the urinary catheter remaining today? urinary retention/bladder outlet obstruction, acute or chronic    Assessment/Plan   Vinicius Arrioal is a 88 y.o. year old male patient with Past Medical History of  PAD, afib/flutter, hx DVT on warfarin, HTN, CAD s/p stent, mitral regurg, HLD, polycythemia vera, managed by Dr. Rothman ([facility]), JOVANNI, basal cell ca on face, s/p removal and radiation of malignant mesothelioma, S/p L VATS with decort 5/2022  c/b post op ileus, XRT 9-10/2022, found to have a recurrence of Mesothelioma on PET scan and underwent first chemotherapy session on 5/15/2024, after which he developed fever, diarrhea, nausea an vomiting. Admitted to [facility] and treated empirically for sepsis presumed due to RLL Pneumonia iso of CT CAP showing significant collapse of the left lung worsened appearence of known Mesothelioma with concern for tumor thrombus, new chest wall invasion, right lower lobe ill-defined patchy infiltrate, as well as mild uncomplicated diverticulitis. Hospital course complicated with hypotension that responded to fluids, and new oxygen requirement. The patient is currently HDS with no indication for ICU.     5/20/24 Updates:   - INR 3.4 supratherapeutic, holding warfarin  - febrile 38.2  - de-escalated abx from filiberto to Zosyn + vanco   - contacting Dr. [facility] for recs on coumadin vs lovenox   - IV lasix 40    - had \"half-dose\" temfexy on 5/14 at [facility] (Dr. Wesley Galvan is primary oncologist)    # Fever  # Pneumonia, Right lower lobe ill-defined patchy infiltrate  #Sepsis most likely d/t pneumonia  #AHRF  :: multifactorial, volume up on exam so may be component of pulmonary edema w/ pneumonia  :: at OSH met SIRS criteria (febrile, tachypnea)  :: UA " "5/18 with small LE, 3+ bacteria   :: Resp Cult salivary contamination  :: MRSA nares: negative  :: Strep/Legionella antigens: negative  :: CRP 30.46>42.36  :: s/p meropenem (5/19-5/20)  - vanc (5/16-5/18) (5/20-)  - zosyn (5/16-5/18) (5/20-)  - Azithro (5/16-5/18)  - IV lasix 40  - pending c diff and enteric stool panel  - urine cx pending  - BC 5/16 NGTD, repeat BC 5/20 pending     # Worsening mesothelioma  # tumor Thrombus  #Mesothelioma with Loculated Pleural Effusions   #Left Hilar Mass, Left Pulmonary Trunk Mass  #History JOVANNI, Pulmonary Embolism  :: primary oncologist Dr. Galvan  :: s/p L VATS w/ decort 5/2022, XRT 9-10/22  :: s/p \"half-dose\" temfexy on 5/14  :: CT chest 5/18 with significant collapse of left lung, loculated pleural effusions consistent with patient's known mesothelioma.  Also shows ill-defined patchy infiltrate in right lower lobe.  Shows left hilar mass with extension into left pulmonary trunk likely tumor thrombus   -  On 5L nasal cannula, wean supplemental O2 for SpO2 goal >90%   - INR 3.4 supratherapeutic, holding warfarin, pending Dr. Henderson on recs      # CKD (baseline Cr 1.3-1.4)  # urine retention s/p Boyd Catheter Placement by Urology in OSH  :: US renal 5/16: Nonobstructing 1 cm left renal calculus, no hydronephrosis  - ctm RFP  - Attempt boyd removal and void trial   - strict in/out  - tamsulosin 0.4 mg daily     # Down trending Hb 10 < 13.4 on admission  #History Afib, HLD, CAD, PAD  #History DVT/PE on Warfarin  - hb downtrend likely iatrogenic iso of repeated blood draws, fluid resuscitation. Currently no sx of overt bleeding.  - would consider holding AC if significant Hb drop on follow up CBC,  - Continue home metoprolol 25 mg twice daily  - Continue home aspirin, statin, warfarin     # Hospital-acquired Delirium  :: waxing and waning altered mental status  - apply delirium precautions    Msc: on Trazodone for insomnia     Antibiotics:: vanc/zosyn  Dispo: pending PT/ OT snf   "   F: Replete PRN  E: Keep Mg >2, phos >3  and K >4  N: regular diet  A: PIV  O2: On NC 4 L  DVT ppx: warfarin  Code Status: Full code  Contact: Kirsten Arriola (Spouse)  [phone] (Work Phone)        MYLA AUSTIN  Medical Student     I agree with the above assessment and plan,  Ernestine Owen  Internal Medicine, PGY-1

## 2024-05-20 NOTE — SIGNIFICANT EVENT
Rapid Response RN Note    RADAR score 7 due to the following VS: T 37.1 °Celsius; HR 84 ; RR 40; /75; SPO2 93%.     Reviewed above VS with bedside RN via phone.  Vital signs within patient's current trends.  No acute change in condition.  No interventions by rapid response team indicated at this time.    Staff to page rapid response for any concerns or acute change in condition/VS. Jimenez Caba RN.

## 2024-05-20 NOTE — SIGNIFICANT EVENT
SENIOR STAFFING NOTE    Vinicius Arriola is a 88 y.o. year old male patient with Past Medical History of PAD, afib/flutter, hx DVT on warfarin, HTN, CAD s/p stent, mitral regurg, HLD, polycythemia vera, BCC s/p excision, SVC thrombus and malignant mesothelioma s/p L VATS with decort 5/2022, XRT 9-10/2022 due to poor performance status and monitored w/ surveillance, w/ disease recurrence/progression s/p half dose chemo (unclear on 5/15/24 who presents as a direct transfer from [facility] for further oncological care.    Patient was admitted to [facility] on 5/16 with nausea/vomiting and urinary retention after half dose chemo the previous day.   Was found to be hypotensive/tachycardic (uncharted vitals) and initiated on sepsis protocol w/ Vanc/Zosyn. Labs significant for WBC 11.2 (91.3% neutrophils), hyponatremia (132), Cr 1.35, UA clean, BCx x 2 5/16 have been NGTD.  Etiology of infection thought to be either respiratory or diverticulitis based on Ct A/P (5/16) showing Subtle inflammatory change along the proximal sigmoid colon which may represent a low-grade acute diverticulitis and Complete collapse and consolidation of the left lung with a loculated effusion at the left lung base, unchanged compared to the prior chest CT and likely consistent with patient's reported mesothelioma, possibly chronic post treatment change.   Patient continued to be febrile despite being on abx treatment w/ last temperature 38.2 on 5/19 at 9am. On 5/18 patient had an episode of hypotension requiring escalation to ICU. He was fluid responsive w/ 1.5 L LR. Also found to have a new O2 requirement. Repeat CT CAP on 5/18 showed  CHEST:  1. Significant collapse of the left lung with heterogenous appearance, pleural thickening and small loculated pleural fluid measuring 7.8 cm consistent with the patient's known mesothelioma which have worsened from the prior CT scan dated 06/14/2023 and grossly unchanged from 12/10/2023 unenhanced CT scan  allowing for  differences in techniques. Focus of new left posterior chest wall invasion noted at the level of 9th 10th and 10th 11th rib spaces.   2. Right lower lobe ill-defined patchy infiltrate measuring 2.3 x 1.4 cm. Trace right-sided pleural effusion with surrounding atelectasis.   3. Redemonstrated left hilar ill-defined enhancing fullness appears extending to the left pulmonary trunk likely tumor thrombus and felt less likely bland thrombus which has significantly worsened from 06/14/2023 CT scan.  4. Mildly enlarged multiple mediastinal lymph nodes in the largest in  the subcarinal region measuring 1.6 cm, grossly unchanged from prior.  ABDOMEN-PELVIS:  1. Proximal sigmoid diverticulosis with mild wall thickening could be related to episodes of underlying mild uncomplicated diverticulitis  2. Other ancillary findings are unchanged.    On our interview, patient corroborates history of presenting with nausea/vomiting and diarrhea that started after receiving half dose of chemo on 5/15/24. He also reports feverishness w/ out documented fever.  Since admission he reported his diarrhea has subsided over the past 2 days, denies nausea/vomiting. He continues to have a fever and abdominal pain w/ distention.   He endorses dry cough over the past 1 month w/ out any expectorate and hasn't worsened since. He has been experiencing SALINAS over the past 1 month but denies any SOB at rest or worsening dyspnea since admission.   He denies change in appetite, weight loss, chest pain, dysuria/hematuria/change in urinary frequency.    Brief oncological hx per Dr Ann's mote 5/13/24:  Mr. Arriola is an 88 yo with multiple medical comorbidities who first met Dr. Luke Hahn in late April 2022 when he was hospitalized for a traumatic fall and noted to have a L pleural effusion. This resolved with a pigtail catheter, but unfortunately recurred. He ultimately had L VATS with pulmonary decortication on 5/22/22, course c/b post-op  ileus. Dr. Hahn did not note any lesions or studding intraoperatively, but unfortunately random L pleural rind biopsy revealed malignant mesothelioma, epithelioid type. Due to poor performance status, he received definitive radiation only from 9/30-10/6/22. He was then monitored with surveillance. He had recurrence of pleural effusion which was negative on cytology and treated for pneumonia. Scans without evidence of recurrence. Lengthy discussion with patient and wife regarding potential ipi/nivo, and he does not want systemic cancer-directed therapy.   He had a PET/CT scan done on 4/26/24, which showed the cancer had gone into his lymph nodes. He had been seeing Dr. Rothman for his polycythemia, so they met Dr. Galvan (that's who ordered the PET/CT). They are recommending a clinical trial with chemotherapy. Said he would do half the dose of chemo. He has no appetite, sleeps a lot in the recliner during the day - doesn't eat. He's lost about 25 lbs in the last 2 years, not a big difference, just a little difference. He is in his recliner 75% of the day, he is only walking with his walker and can't go too far. He doesn't go anywhere, just to the doctor. His breathing is ok, but the coughing has gotten a lot worse.       On exam:  Vitals:    05/19/24 2058   BP: 133/72   Pulse: 95   Resp: (!) 35   Temp: 37.3 °C (99.1 °F)   SpO2: 94%      Physical exam:  Constitutional: Awake, alert, NAD  Eyes: EOMI, clear sclera  ENMT: moist mucous membranes  Head/Neck: no appreciable JVD  Respiratory/Thorax: decreased breath sounds left base, crackles on the right base, on 5 L O2 w/ RR around 30  Cardiovascular: +S1, +S2, RRR, no m/r/g  Gastrointestinal: distended, non tender, no rigidity  Extremities: B/L 1+ Edema +  Neurological: Aox3 but intermittently confused w/ tangential comments, following commands, no gross focal neuro deficits  Skin: warm and dry    A+P:  Vinicius Arriola is a 88 y.o. year old male patient with Past Medical  History of PAD, afib/flutter, hx DVT on warfarin, HTN, CAD s/p stent, mitral regurg, HLD, polycythemia vera, BCC s/p excision and malignant mesothelioma s/p L VATS with decort 5/2022, XRT 9-10/2022, SVC thrombus who presents as a direct transfer from [facility] for oncological care. Initially admitted with nausea/vomiting/diarrhea w/ concerns for sepsis w/ infectious source being PNA vs diverticulitis. Continued to be febrile while on antibiotics with deterioration in clinical status n 6/18 in the form of fluid responsive hypotension promoting escalation of antibiotics to meropenem w/ CT CAP showing redemonstarting of left lung collapse/consolidation with loculated effusion, new left posterior chest wall and 9-11th rib invasion, new right lung consolidation and left hilar ill-defined enhancing fullness appearing to extend to the left pulmonary trunk c/f tumor thrombus. Patient continues to be febrile w/ ddx including infectious source (PNA vs diverticulitis), will also w/u for C. diff given diarrhea. Other noninfectious differentials for his fever include tumor thrombus, progression of malignancy itself, chemo or ICI irAEs. Although, he has not received chemo in the  system and we are unclear what exactly he has received (cross checked w/ pharmacy). AHRF w/ new 5 L O2 requirement is likely a combination of PNA and volume overload, will treat w/ abx and diurese once BP stable.       # Pyrexia- Ddx infectious (PNA, diverticulitis, r/o C diff) vs noninfectious (chemo or irAEs vs tumor thrombus vs malignancy)  # c/f sepsis  # Left LL collapse/consolidation and right LL consolidation  # Mild Diverticulitis  Strep, legionella, MRSA negative  s/p Vanc (5/16-5/19), Zosyn (5/16-5/18), s/p azithromycin  Plan:  -c/w meropenem 5/18- date  -send C. Diff and stool panel  -f/u CRP, procal  -Repeat BCx x 2, UCx  -May consider pleural tap if fever does not defervesce w/out known focus   - Consider ID consult if persistently  febrile  - Xray abdomen     #AHRF  DDx: Progression of malignant mesothelioma, left pulmonary trunk tumor thrombusPNA, volume overload  New 5 L O2 requirement  Plan:  -c/w meropenem  - Wean O2 as tolerated  - Diurese w/ Lasix Iv if BP remains stable  - Duonebs q4 hrs scheduled and albuterol q6hrs PRN  -follow up BNP, TTE    #Malignant mesothelioma  #Local invasion of 9-11th rib  #c/f left pulmonary trunk thrombus  s/p L VATS with decort 5/2022, XRT 9-10/2022,  Progression of disease w/ new invasion of posteior chest wall   Reportedly half dose chemo, unclear   Primary oncologist : Dr Ann  Plan:  -see above management for acute hypoxic resp failure  - c/w home aspirin, rosuvastatin  - c/w home warfarin 2mg daily - would switch to heparin gtt if any planned intervention, currently none planned. H/H has been trickling down likely dt frequent phlebotomies and fluid dilution but no active bleeding.    # PAD  #hx of DVT  #CAD  #Afib  - c/w home aspirin, rosuvastatin  - c/w home warfarin 2mg daily   -c/w home metoprolol  - discontinue home scheduled lasix    #Misc  -c/w Vitamin C, Vitamin D    FULL CODE  NOK:   Kirsten Arriola (Spouse)  [phone] (Work Phone)     Please see excellent HPI by Dr Flores for details on HPI and plan

## 2024-05-20 NOTE — SIGNIFICANT EVENT
Rapid Response RN Note    Rapid response RN at bedside for RADAR score 7 due to the following VS: T 37.6 °Celsius; HR 94 ; RR 35; /69; SPO2 94%.     Reviewed above VS with bedside RN.  VS within patient's current trends.  Patient denied pain, shortness of breath, dizziness or lightheadedness.  No interventions by rapid response team indicated at this time.      Staff to page rapid response for any concerns or acute change in condition/VS.

## 2024-05-20 NOTE — PROGRESS NOTES
Occupational Therapy    Evaluation    Patient Name: Vinicius Arriola  MRN: [id number]  Today's Date: 5/20/2024  Time Calculation  Start Time: 1354  Stop Time: 1421  Time Calculation (min): 27 min    Assessment  IP OT Assessment  OT Assessment: Impaired ability to complete bed mobility, transfers, and ADLs.  Prognosis: Good  Barriers to Discharge: None  Evaluation/Treatment Tolerance: Patient tolerated treatment well  Medical Staff Made Aware: Yes  End of Session Communication: Bedside nurse  End of Session Patient Position: Bed, 3 rail up, Alarm on  Plan:  Treatment Interventions: ADL retraining, Functional transfer training, Endurance training  OT Frequency: 3 times per week  OT Discharge Recommendations: Moderate intensity level of continued care  OT Recommended Transfer Status: Maximum assist, Assist of 1  OT - OK to Discharge: Yes    Subjective     Current Problem:  1. 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Multi)        2. Atrial flutter, unspecified type (Multi)        3. Atrial fibrillation, unspecified type (Multi)  Transthoracic Echo (TTE) Complete    Transthoracic Echo (TTE) Complete      4. Heart failure due to high blood pressure (Multi)  Transthoracic Echo (TTE) Complete    Transthoracic Echo (TTE) Complete          General:  Reason for Referral: admitted to [facility] on 5/16 with nausea/vomiting and urinary retention after half dose chemo the previous day.   Was found to be hypotensive/tachycardic; Significant collapse of the left lung  Past Medical History Relevant to Rehab: PAD, afib/flutter, hx DVT on warfarin, HTN, CAD s/p stent, mitral regurg, HLD, polycythemia vera, BCC s/p excision, SVC thrombus and malignant mesothelioma s/p L VATS with decort 5/2022, XRT 9-10/2022 due to poor performance status and monitored w/ surveillance, w/ disease recurrence/progression s/p half dose chemo  Prior to Session Communication: Bedside nurse  Patient Position Received: Bed, 3 rail up, Alarm on  Family/Caregiver Present: Yes  Caregiver  Feedback: Pt's wife and daughter were present at bedside.  General Comment: Supine with HOB slightly elevated at the start of the session.     Precautions:  Medical Precautions: Oxygen therapy device and L/min, Fall precautions (c-diff contact precautions)    Pain:  Pain Assessment  Pain Assessment: 0-10  Pain Score: 0 - No pain      Objective   Cognition:  Overall Cognitive Status: Impaired  Orientation Level: Oriented X4  Safety/Judgement: Exceptions to WFL  Insight: Mild     Home Living:  Type of Home: House  Lives With: Spouse  Home Adaptive Equipment: Walker rolling or standard  Home Layout: One level  Home Access: Stairs to enter with rails  Entrance Stairs-Number of Steps: 2  Bathroom Shower/Tub: Walk-in shower  Bathroom Toilet: Handicapped height  Bathroom Equipment: Built-in shower seat     Prior Function:  Level of [place]: Needs assistance with ADLs (Pt's wife provides assistance PRN.)  Receives Help From: Family  Ambulatory Assistance:  (pt's wife is close to pt when he ambulates short distances using a wheeled walker.)    ADL:  Eating Assistance: Independent  Grooming Assistance: Stand by  Grooming Deficit: Setup (anticipate)  Bathing Assistance: Maximal  Bathing Deficit:  (anticipate)  Toileting Assistance with Device: Total  Toileting Deficit: Fecal Management System, Urinary Catheter    Activity Tolerance:  Endurance: Decreased tolerance for upright activites    Balance:  Dynamic Sitting Balance  Dynamic Sitting-Balance Support: Bilateral upper extremity supported  Dynamic Sitting-Balance:  (min assist x 1 to scoot at the side of the bed.)  Dynamic Standing Balance  Dynamic Standing-Balance Support: Bilateral upper extremity supported  Dynamic Standing-Balance:  (max assist x 1 to take a side step.)  Static Sitting Balance  Static Sitting-Balance Support: Bilateral upper extremity supported  Static Sitting-Level of Assistance: Close supervision  Static Standing Balance  Static Standing-Balance  Support: Bilateral upper extremity supported  Static Standing-Level of Assistance: Moderate assistance  Static Standing-Comment/Number of Minutes: x 2 person assist    Bed Mobility/Transfers: Bed Mobility  Bed Mobility: Yes  Bed Mobility 1  Bed Mobility 1: Supine to sitting, Sitting to supine  Level of Assistance 1: Maximum assistance, Minimal verbal cues  Bed Mobility Comments 1: log roll with HOB fully elevated.  Bed Mobility 2  Bed Mobility  2: Rolling left  Bed Mobility Comments 2: max assist x 1   and Transfers  Transfer: Yes  Transfer 1  Technique 1: Sit to stand  Transfer Level of Assistance 1: Moderate assistance, +2  Trials/Comments 1: from an elevated surface.    Sensation:  Light Touch: No apparent deficits    Coordination:  Movements are Fluid and Coordinated: Yes     Hand Function:  Hand Function  Gross Grasp: Functional  Coordination: Functional    Extremities: RUE   RUE : Within Functional Limits, LUE   LUE: Within Functional Limits,  , and      Outcome Measures: [facility] Daily Activity  Putting on and taking off regular lower body clothing: A lot  Bathing (including washing, rinsing, drying): A lot  Putting on and taking off regular upper body clothing: A lot  Toileting, which includes using toilet, bedpan or urinal: Total  Taking care of personal grooming such as brushing teeth: A little  Eating Meals: A little  Daily Activity - Total Score: 13         ,     OT Adult Other Outcome Measures  4AT: Positive    Education Documentation  Body Mechanics, taught by Reshma Olivas OT at 5/20/2024  3:53 PM.  Learner: Family, Patient  Readiness: Acceptance  Method: Explanation  Response: Verbalizes Understanding    ADL Training, taught by Reshma Olivas OT at 5/20/2024  3:53 PM.  Learner: Family, Patient  Readiness: Acceptance  Method: Explanation  Response: Verbalizes Understanding    Education Comments  No comments found.        Goals:   Encounter Problems       Encounter Problems (Active)       ADLs        Patient will perform UB and LB bathing  with modified independent level of assistance and bedside commode. (Progressing)       Start:  05/20/24    Expected End:  06/10/24            Patient with complete lower body dressing with modified independent level of assistance donning and doffing all LE clothes  with PRN adaptive equipment while edge of bed  (Progressing)       Start:  05/20/24    Expected End:  06/10/24            Patient will complete toileting including hygiene clothing management/hygiene with minimal assist  level of assistance and grab bars. (Progressing)       Start:  05/20/24    Expected End:  06/10/24               BALANCE       Pt will maintain dynamic standing balance during ADL task with minimal assist  level of assistance in order to demonstrate decreased risk of falling and improved postural control. (Progressing)       Start:  05/20/24    Expected End:  06/10/24            Patient will tolerate standing for 4 minutes to minimal assist  level of assistance with least restrictive device in order to improve functional activity tolerance for ADL tasks. (Progressing)       Start:  05/20/24    Expected End:  06/10/24               COGNITION/SAFETY       Patient will score WFL on standardized cognitive assessment with min cues and within reasonable time frame (Progressing)       Start:  05/20/24    Expected End:  06/10/24               MOBILITY       Patient will perform Functional mobility min Household distances/Community Distances with minimal assist  level of assistance and least restrictive device in order to improve safety and functional mobility. (Progressing)       Start:  05/20/24    Expected End:  06/10/24               TRANSFERS       Patient will perform bed mobility minimal assist  level of assistance and bed rails in order to improve safety and independence with mobility (Progressing)       Start:  05/20/24    Expected End:  06/10/24                   Treatment Completed on  Evaluation  Therapy/Activity:     Therapeutic Activity  Therapeutic Activity Performed: Yes  Therapeutic Activity 1: Increased time provided to complete functional transfers, while sitting at the side of the bed, pt required mod assist x 2 to complete sit to stand transfer, mod assist x 1 scooting at the side of the bed. Pt required max assist x 1 to take a side steps in standing with max assist x 1 and fair dynamic standing balance.          05/20/24 at 3:55 PM   Reshma ORTIZ/L, OTD  Rehab Office: [phone]

## 2024-05-21 ENCOUNTER — APPOINTMENT (OUTPATIENT)
Dept: RADIOLOGY | Facility: HOSPITAL | Age: 89
DRG: 871 | End: 2024-05-21
Payer: MEDICARE

## 2024-05-21 ENCOUNTER — APPOINTMENT (OUTPATIENT)
Dept: CARDIOLOGY | Facility: HOSPITAL | Age: 89
End: 2024-05-21
Payer: MEDICARE

## 2024-05-21 LAB
ADENOVIRUS RVP, VIRC: NOT DETECTED
ALBUMIN SERPL BCP-MCNC: 2.7 G/DL (ref 3.4–5)
ALP SERPL-CCNC: 64 U/L (ref 33–136)
ALT SERPL W P-5'-P-CCNC: 73 U/L (ref 10–52)
ANION GAP BLDA CALCULATED.4IONS-SCNC: 9 MMO/L (ref 10–25)
ANION GAP SERPL CALC-SCNC: 16 MMOL/L (ref 10–20)
AORTIC VALVE PEAK VELOCITY: 1.82 M/S
APTT PPP: 47 SECONDS (ref 27–38)
AST SERPL W P-5'-P-CCNC: 167 U/L (ref 9–39)
AV PEAK GRADIENT: 13.2 MMHG
AVA (PEAK VEL): 2.45 CM2
BACTERIA BLD CULT: NORMAL
BACTERIA BLD CULT: NORMAL
BACTERIA UR CULT: NO GROWTH
BASE EXCESS BLDA CALC-SCNC: 1.2 MMOL/L (ref -2–3)
BASOPHILS # BLD MANUAL: 0.01 X10*3/UL (ref 0–0.1)
BASOPHILS NFR BLD MANUAL: 0.8 %
BILIRUB SERPL-MCNC: 0.6 MG/DL (ref 0–1.2)
BODY TEMPERATURE: 37 DEGREES CELSIUS
BUN SERPL-MCNC: 30 MG/DL (ref 6–23)
BURR CELLS BLD QL SMEAR: ABNORMAL
C COLI+JEJ+UPSA DNA STL QL NAA+PROBE: NOT DETECTED
CA-I BLDA-SCNC: 1.16 MMOL/L (ref 1.1–1.33)
CALCIUM SERPL-MCNC: 8.8 MG/DL (ref 8.6–10.6)
CHLORIDE BLDA-SCNC: 101 MMOL/L (ref 98–107)
CHLORIDE SERPL-SCNC: 99 MMOL/L (ref 98–107)
CO2 SERPL-SCNC: 27 MMOL/L (ref 21–32)
CREAT SERPL-MCNC: 1.64 MG/DL (ref 0.5–1.3)
EC STX1 GENE STL QL NAA+PROBE: NOT DETECTED
EC STX2 GENE STL QL NAA+PROBE: NOT DETECTED
EGFRCR SERPLBLD CKD-EPI 2021: 40 ML/MIN/1.73M*2
EJECTION FRACTION APICAL 4 CHAMBER: 73.6
ENTEROVIRUS/RHINOVIRUS RVP, VIRC: NOT DETECTED
EOSINOPHIL # BLD MANUAL: 0.01 X10*3/UL (ref 0–0.4)
EOSINOPHIL NFR BLD MANUAL: 1.7 %
ERYTHROCYTE [DISTWIDTH] IN BLOOD BY AUTOMATED COUNT: 16.9 % (ref 11.5–14.5)
GLUCOSE BLDA-MCNC: 131 MG/DL (ref 74–99)
GLUCOSE SERPL-MCNC: 104 MG/DL (ref 74–99)
HCO3 BLDA-SCNC: 25 MMOL/L (ref 22–26)
HCT VFR BLD AUTO: 33.2 % (ref 41–52)
HCT VFR BLD EST: 33 % (ref 41–52)
HGB BLD-MCNC: 10.3 G/DL (ref 13.5–17.5)
HGB BLDA-MCNC: 11.1 G/DL (ref 13.5–17.5)
HUMAN BOCAVIRUS RVP, VIRC: NOT DETECTED
HUMAN CORONAVIRUS RVP, VIRC: NOT DETECTED
IMM GRANULOCYTES # BLD AUTO: 0.03 X10*3/UL (ref 0–0.5)
IMM GRANULOCYTES NFR BLD AUTO: 4.2 % (ref 0–0.9)
INFLUENZA A , VIRC: NOT DETECTED
INFLUENZA A H1N1-09 , VIRC: NOT DETECTED
INFLUENZA B PCR, VIRC: NOT DETECTED
INHALED O2 CONCENTRATION: 3 %
INR PPP: 4.8 (ref 0.9–1.1)
LACTATE BLDA-SCNC: 1.1 MMOL/L (ref 0.4–2)
LEFT VENTRICLE INTERNAL DIMENSION DIASTOLE: 4.1 CM (ref 3.5–6)
LEFT VENTRICULAR OUTFLOW TRACT DIAMETER: 2 CM
LV EJECTION FRACTION BIPLANE: 79 %
LYMPHOCYTES # BLD MANUAL: 0.26 X10*3/UL (ref 0.8–3)
LYMPHOCYTES NFR BLD MANUAL: 37.8 %
MAGNESIUM SERPL-MCNC: 2.21 MG/DL (ref 1.6–2.4)
MCH RBC QN AUTO: 27.9 PG (ref 26–34)
MCHC RBC AUTO-ENTMCNC: 31 G/DL (ref 32–36)
MCV RBC AUTO: 90 FL (ref 80–100)
METAPNEUMOVIRUS , VIRC: NOT DETECTED
MONOCYTES # BLD MANUAL: 0.04 X10*3/UL (ref 0.05–0.8)
MONOCYTES NFR BLD MANUAL: 5.9 %
NEUTS SEG # BLD MANUAL: 0.33 X10*3/UL (ref 1.6–5)
NEUTS SEG NFR BLD MANUAL: 47.1 %
NOROVIRUS GI + GII RNA STL NAA+PROBE: NOT DETECTED
NRBC BLD-RTO: 0 /100 WBCS (ref 0–0)
OVALOCYTES BLD QL SMEAR: ABNORMAL
OXYHGB MFR BLDA: 96.9 % (ref 94–98)
PARAINFLUENZA PCR, VIRC: NOT DETECTED
PCO2 BLDA: 36 MM HG (ref 38–42)
PH BLDA: 7.45 PH (ref 7.38–7.42)
PHOSPHATE SERPL-MCNC: 3.3 MG/DL (ref 2.5–4.9)
PLATELET # BLD AUTO: 73 X10*3/UL (ref 150–450)
PO2 BLDA: 93 MM HG (ref 85–95)
POTASSIUM BLDA-SCNC: 3.2 MMOL/L (ref 3.5–5.3)
POTASSIUM SERPL-SCNC: 3.7 MMOL/L (ref 3.5–5.3)
PROT SERPL-MCNC: 6.2 G/DL (ref 6.4–8.2)
PROTHROMBIN TIME: 54.9 SECONDS (ref 9.8–12.8)
RBC # BLD AUTO: 3.69 X10*6/UL (ref 4.5–5.9)
RBC MORPH BLD: ABNORMAL
RIGHT VENTRICLE FREE WALL PEAK S': 18.2 CM/S
RIGHT VENTRICLE PEAK SYSTOLIC PRESSURE: 36.6 MMHG
RSV PCR, RVP, VIRC: NOT DETECTED
RV RNA STL NAA+PROBE: NOT DETECTED
SALMONELLA DNA STL QL NAA+PROBE: NOT DETECTED
SAO2 % BLDA: 100 % (ref 94–100)
SHIGELLA DNA SPEC QL NAA+PROBE: NOT DETECTED
SODIUM BLDA-SCNC: 132 MMOL/L (ref 136–145)
SODIUM SERPL-SCNC: 138 MMOL/L (ref 136–145)
TOTAL CELLS COUNTED BLD: 119
TRICUSPID ANNULAR PLANE SYSTOLIC EXCURSION: 1.6 CM
V CHOLERAE DNA STL QL NAA+PROBE: NOT DETECTED
VARIANT LYMPHS # BLD MANUAL: 0.05 X10*3/UL (ref 0–0.3)
VARIANT LYMPHS NFR BLD: 6.7 %
WBC # BLD AUTO: 0.7 X10*3/UL (ref 4.4–11.3)
Y ENTEROCOL DNA STL QL NAA+PROBE: NOT DETECTED

## 2024-05-21 PROCEDURE — 85027 COMPLETE CBC AUTOMATED: CPT

## 2024-05-21 PROCEDURE — 74018 RADEX ABDOMEN 1 VIEW: CPT

## 2024-05-21 PROCEDURE — 2500000004 HC RX 250 GENERAL PHARMACY W/ HCPCS (ALT 636 FOR OP/ED)

## 2024-05-21 PROCEDURE — 2500000005 HC RX 250 GENERAL PHARMACY W/O HCPCS

## 2024-05-21 PROCEDURE — 99233 SBSQ HOSP IP/OBS HIGH 50: CPT

## 2024-05-21 PROCEDURE — 93306 TTE W/DOPPLER COMPLETE: CPT | Performed by: INTERNAL MEDICINE

## 2024-05-21 PROCEDURE — A4217 STERILE WATER/SALINE, 500 ML: HCPCS

## 2024-05-21 PROCEDURE — 1170000001 HC PRIVATE ONCOLOGY ROOM DAILY

## 2024-05-21 PROCEDURE — 2500000001 HC RX 250 WO HCPCS SELF ADMINISTERED DRUGS (ALT 637 FOR MEDICARE OP)

## 2024-05-21 PROCEDURE — 71045 X-RAY EXAM CHEST 1 VIEW: CPT | Performed by: RADIOLOGY

## 2024-05-21 PROCEDURE — 94640 AIRWAY INHALATION TREATMENT: CPT | Mod: MUE

## 2024-05-21 PROCEDURE — 84100 ASSAY OF PHOSPHORUS: CPT

## 2024-05-21 PROCEDURE — 74018 RADEX ABDOMEN 1 VIEW: CPT | Performed by: RADIOLOGY

## 2024-05-21 PROCEDURE — 36600 WITHDRAWAL OF ARTERIAL BLOOD: CPT

## 2024-05-21 PROCEDURE — 36415 COLL VENOUS BLD VENIPUNCTURE: CPT | Performed by: STUDENT IN AN ORGANIZED HEALTH CARE EDUCATION/TRAINING PROGRAM

## 2024-05-21 PROCEDURE — 93306 TTE W/DOPPLER COMPLETE: CPT

## 2024-05-21 PROCEDURE — 2500000002 HC RX 250 W HCPCS SELF ADMINISTERED DRUGS (ALT 637 FOR MEDICARE OP, ALT 636 FOR OP/ED)

## 2024-05-21 PROCEDURE — 82805 BLOOD GASES W/O2 SATURATION: CPT | Mod: 91

## 2024-05-21 PROCEDURE — 85007 BL SMEAR W/DIFF WBC COUNT: CPT

## 2024-05-21 PROCEDURE — 83735 ASSAY OF MAGNESIUM: CPT

## 2024-05-21 PROCEDURE — 84132 ASSAY OF SERUM POTASSIUM: CPT | Mod: 91

## 2024-05-21 PROCEDURE — 2500000006 HC RX 250 W HCPCS SELF ADMINISTERED DRUGS (ALT 637 FOR ALL PAYERS): Mod: MUE

## 2024-05-21 PROCEDURE — 85610 PROTHROMBIN TIME: CPT | Performed by: STUDENT IN AN ORGANIZED HEALTH CARE EDUCATION/TRAINING PROGRAM

## 2024-05-21 PROCEDURE — 71045 X-RAY EXAM CHEST 1 VIEW: CPT

## 2024-05-21 PROCEDURE — 2500000004 HC RX 250 GENERAL PHARMACY W/ HCPCS (ALT 636 FOR OP/ED): Performed by: STUDENT IN AN ORGANIZED HEALTH CARE EDUCATION/TRAINING PROGRAM

## 2024-05-21 RX ORDER — FOLIC ACID 1 MG/1
1 TABLET ORAL DAILY
Status: DISCONTINUED | OUTPATIENT
Start: 2024-05-21 | End: 2024-05-21

## 2024-05-21 RX ADMIN — FOLIC ACID 1 MG: 1 TABLET ORAL at 09:37

## 2024-05-21 RX ADMIN — SODIUM CHLORIDE, POTASSIUM CHLORIDE, SODIUM LACTATE AND CALCIUM CHLORIDE 500 ML: 600; 310; 30; 20 INJECTION, SOLUTION INTRAVENOUS at 16:51

## 2024-05-21 RX ADMIN — PIPERACILLIN SODIUM AND TAZOBACTAM SODIUM 4.5 G: 4; .5 INJECTION, SOLUTION INTRAVENOUS at 14:40

## 2024-05-21 RX ADMIN — VANCOMYCIN HYDROCHLORIDE 1250 MG: 5 INJECTION, POWDER, LYOPHILIZED, FOR SOLUTION INTRAVENOUS at 17:29

## 2024-05-21 RX ADMIN — Medication 3 L/MIN: at 20:00

## 2024-05-21 RX ADMIN — FOLIC ACID 1 MG: 5 INJECTION, SOLUTION INTRAMUSCULAR; INTRAVENOUS; SUBCUTANEOUS at 16:49

## 2024-05-21 RX ADMIN — PIPERACILLIN SODIUM AND TAZOBACTAM SODIUM 4.5 G: 4; .5 INJECTION, SOLUTION INTRAVENOUS at 09:33

## 2024-05-21 RX ADMIN — FILGRASTIM-SNDZ 480 MCG: 480 INJECTION, SOLUTION INTRAVENOUS; SUBCUTANEOUS at 10:30

## 2024-05-21 RX ADMIN — IPRATROPIUM BROMIDE AND ALBUTEROL SULFATE 3 ML: .5; 3 SOLUTION RESPIRATORY (INHALATION) at 08:54

## 2024-05-21 RX ADMIN — IPRATROPIUM BROMIDE AND ALBUTEROL SULFATE 3 ML: .5; 3 SOLUTION RESPIRATORY (INHALATION) at 03:19

## 2024-05-21 RX ADMIN — Medication 3 L/MIN: at 09:00

## 2024-05-21 RX ADMIN — PIPERACILLIN SODIUM AND TAZOBACTAM SODIUM 4.5 G: 4; .5 INJECTION, SOLUTION INTRAVENOUS at 21:28

## 2024-05-21 RX ADMIN — PERFLUTREN 2 ML OF DILUTION: 6.52 INJECTION, SUSPENSION INTRAVENOUS at 16:12

## 2024-05-21 RX ADMIN — TAMSULOSIN HYDROCHLORIDE 0.4 MG: 0.4 CAPSULE ORAL at 09:33

## 2024-05-21 RX ADMIN — NYSTATIN 1 APPLICATION: 100000 POWDER TOPICAL at 21:28

## 2024-05-21 RX ADMIN — IPRATROPIUM BROMIDE AND ALBUTEROL SULFATE 3 ML: .5; 3 SOLUTION RESPIRATORY (INHALATION) at 21:10

## 2024-05-21 RX ADMIN — OXYCODONE HYDROCHLORIDE AND ACETAMINOPHEN 1000 MG: 500 TABLET ORAL at 09:34

## 2024-05-21 RX ADMIN — IPRATROPIUM BROMIDE AND ALBUTEROL SULFATE 3 ML: .5; 3 SOLUTION RESPIRATORY (INHALATION) at 14:47

## 2024-05-21 RX ADMIN — FERROUS SULFATE TAB 325 MG (65 MG ELEMENTAL FE) 1 TABLET: 325 (65 FE) TAB at 09:33

## 2024-05-21 RX ADMIN — ONDANSETRON 4 MG: 2 INJECTION INTRAMUSCULAR; INTRAVENOUS at 09:30

## 2024-05-21 RX ADMIN — PIPERACILLIN SODIUM AND TAZOBACTAM SODIUM 4.5 G: 4; .5 INJECTION, SOLUTION INTRAVENOUS at 04:30

## 2024-05-21 ASSESSMENT — COGNITIVE AND FUNCTIONAL STATUS - GENERAL
MOVING TO AND FROM BED TO CHAIR: TOTAL
TURNING FROM BACK TO SIDE WHILE IN FLAT BAD: TOTAL
MOBILITY SCORE: 6
WALKING IN HOSPITAL ROOM: TOTAL
STANDING UP FROM CHAIR USING ARMS: TOTAL
PERSONAL GROOMING: TOTAL
DRESSING REGULAR LOWER BODY CLOTHING: TOTAL
HELP NEEDED FOR BATHING: TOTAL
EATING MEALS: TOTAL
CLIMB 3 TO 5 STEPS WITH RAILING: TOTAL
TOILETING: TOTAL
MOVING FROM LYING ON BACK TO SITTING ON SIDE OF FLAT BED WITH BEDRAILS: TOTAL
DAILY ACTIVITIY SCORE: 6
DRESSING REGULAR UPPER BODY CLOTHING: TOTAL

## 2024-05-21 ASSESSMENT — PAIN SCALES - GENERAL: PAINLEVEL_OUTOF10: 0 - NO PAIN

## 2024-05-21 ASSESSMENT — PAIN - FUNCTIONAL ASSESSMENT: PAIN_FUNCTIONAL_ASSESSMENT: 0-10

## 2024-05-21 NOTE — PROGRESS NOTES
05/21/24 1400   Discharge Planning   Who is requesting discharge planning? Provider   Home or Post Acute Services Post acute facilities (Rehab/SNF/etc)   Type of Post Acute Facility Services Skilled nursing   Patient expects to be discharged to: SNF   Does the patient need discharge transport arranged? Yes   RoundTrip coordination needed? Yes   Has discharge transport been arranged? No     TCC Note    Plan per Medical/Surgical Team: treat sepsis d/t pneumonia, monitor vital signs, labs-INR, CXR, to place NGT  Status: Inpatient  Payor Source: Medicare  Discharge disposition: SNF, pending PT recs  Expected date of discharge: 5/24/24  Barriers: none    PT pending at this time. OT rec SNF. Will continue to follow patient for his dispo needs and provide SNF choices when and if necessary.    Kortney Cortes RN TCC

## 2024-05-21 NOTE — SIGNIFICANT EVENT
Goals of Care Discussion   Goals of care discussion performed during rounds w/ Dr. Marino, patient, and patient's family at bedside. Risks and benefits of CPR and intubation were discussed with the patient. Pt and his family opted to change his code status from Full Code to DNR/DNI. We will honor their wishes and change their code status.

## 2024-05-21 NOTE — PROGRESS NOTES
"Vinicius Arriola is a 88 y.o. male on day 2 of admission presenting with 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Multi).    Subjective   Overnight, patient had RADAR score 7 due to the following VS: T 37.5 °C; HR 81; RR 40; /75; SPO2 94%. CXR obtained w/ stable findings. ABG obtained, pH 7.45, pO2 93, lactate 1.1.     Patient reports no changes in dyspnea or cough. Denies nausea, weakness. Had two episodes of dark brown, malodorous emesis.        Objective   Physical Exam   Gen: well appearing, A+Ox3, in no acute distress  HEENT: EOMI, sclera anicteric, no conjunctival injection, MMM  Resp: CTAB, decrease breath sounds on the left side, crackles on the right side  CV: RRR, no m/r/g. normal S1/S2  GI: Distended, diffuse tenderness throughout (worse than yesterday), dull to percussion. no rebound or guarding  Extremities/MSK: warm and well perfused, [place] edema up to knees 2+ (improved from yesterday)  Neuro: A+Ox3.  Skin: warm and dry, no lesions, no rashes    Last Recorded Vitals  Blood pressure 96/57, pulse 106, temperature 36.4 °C (97.5 °F), temperature source Temporal, resp. rate 22, height 1.727 m (5' 7.99\"), weight 110 kg (242 lb 8.1 oz), SpO2 93%.  Intake/Output last 3 Shifts:  I/O last 3 completed shifts:  In: 700 (6.4 mL/kg) [IV Piggyback:700]  Out: 3400 (30.9 mL/kg) [Urine:3400 (0.9 mL/kg/hr)]  Weight: 110 kg        Scheduled medications  ascorbic acid, 1,000 mg, oral, Daily  aspirin, 81 mg, oral, Nightly  [START ON 5/26/2024] ergocalciferol, 1,250 mcg, oral, Every Sunday  ferrous sulfate (325 mg ferrous sulfate), 65 mg of iron, oral, BID  filgrastim or biosimilar, 480 mcg, subcutaneous, q24h  folic acid, 1 mg, oral, Daily  ipratropium-albuteroL, 3 mL, nebulization, q4h  nystatin, 1 Application, Topical, BID  oxygen, , inhalation, Continuous - Inhalation  perflutren lipid microspheres, 0.5-10 mL of dilution, intravenous, Once in imaging  perflutren protein A microsphere, 0.5 mL, intravenous, Once in " imaging  piperacillin-tazobactam, 4.5 g, intravenous, q6h  rosuvastatin, 10 mg, oral, Nightly  sulfur hexafluoride microsphr, 2 mL, intravenous, Once in imaging  tamsulosin, 0.4 mg, oral, Daily  traZODone, 100 mg, oral, Nightly  vancomycin, 1,250 mg, intravenous, q24h  [Held by provider] warfarin, 2 mg, oral, Daily      PRN medications  PRN medications: acetaminophen **OR** acetaminophen **OR** acetaminophen, albuterol, carboxymethylcellulose, diclofenac sodium, guaiFENesin, melatonin, [Held by provider] nitroglycerin, ondansetron **OR** ondansetron, vancomycin     Relevant Results  Results for orders placed or performed during the hospital encounter of 05/19/24 (from the past 24 hour(s))   Stool Pathogen Panel, PCR    Specimen: Stool   Result Value Ref Range    Campylobacter Group Not Detected Not Detected    Salmonella species Not Detected Not Detected    Shigella species Not Detected Not Detected    Vibrio Group Not Detected Not Detected    Yersinia Enterocolitica Not Detected Not Detected    Shiga Toxin 1 Not Detected Not Detected    Shiga Toxin 2 Not Detected Not Detected    Norovirus GI/GII Not Detected Not Detected    Rotavirus A Not Detected Not Detected   C. difficile, PCR    Specimen: Stool   Result Value Ref Range    C. difficile, PCR Not Detected Not Detected   BLOOD GAS ARTERIAL FULL PANEL   Result Value Ref Range    POCT pH, Arterial 7.45 (H) 7.38 - 7.42 pH    POCT pCO2, Arterial 36 (L) 38 - 42 mm Hg    POCT pO2, Arterial 93 85 - 95 mm Hg    POCT SO2, Arterial 100 94 - 100 %    POCT Oxy Hemoglobin, Arterial 96.9 94.0 - 98.0 %    POCT Hematocrit Calculated, Arterial 33.0 (L) 41.0 - 52.0 %    POCT Sodium, Arterial 132 (L) 136 - 145 mmol/L    POCT Potassium, Arterial 3.2 (L) 3.5 - 5.3 mmol/L    POCT Chloride, Arterial 101 98 - 107 mmol/L    POCT Ionized Calcium, Arterial 1.16 1.10 - 1.33 mmol/L    POCT Glucose, Arterial 131 (H) 74 - 99 mg/dL    POCT Lactate, Arterial 1.1 0.4 - 2.0 mmol/L    POCT Base  Excess, Arterial 1.2 -2.0 - 3.0 mmol/L    POCT HCO3 Calculated, Arterial 25.0 22.0 - 26.0 mmol/L    POCT Hemoglobin, Arterial 11.1 (L) 13.5 - 17.5 g/dL    POCT Anion Gap, Arterial 9 (L) 10 - 25 mmo/L    Patient Temperature 37.0 degrees Celsius    FiO2 3 %   Coagulation Screen   Result Value Ref Range    Protime 54.9 (H) 9.8 - 12.8 seconds    INR 4.8 (H) 0.9 - 1.1    aPTT 47 (H) 27 - 38 seconds   Comprehensive Metabolic Panel   Result Value Ref Range    Glucose 104 (H) 74 - 99 mg/dL    Sodium 138 136 - 145 mmol/L    Potassium 3.7 3.5 - 5.3 mmol/L    Chloride 99 98 - 107 mmol/L    Bicarbonate 27 21 - 32 mmol/L    Anion Gap 16 10 - 20 mmol/L    Urea Nitrogen 30 (H) 6 - 23 mg/dL    Creatinine 1.64 (H) 0.50 - 1.30 mg/dL    eGFR 40 (L) >60 mL/min/1.73m*2    Calcium 8.8 8.6 - 10.6 mg/dL    Albumin 2.7 (L) 3.4 - 5.0 g/dL    Alkaline Phosphatase 64 33 - 136 U/L    Total Protein 6.2 (L) 6.4 - 8.2 g/dL     (H) 9 - 39 U/L    Bilirubin, Total 0.6 0.0 - 1.2 mg/dL    ALT 73 (H) 10 - 52 U/L   Magnesium   Result Value Ref Range    Magnesium 2.21 1.60 - 2.40 mg/dL   Phosphorus   Result Value Ref Range    Phosphorus 3.3 2.5 - 4.9 mg/dL      XR chest 1 view    Result Date: 5/21/2024  Interpreted By:  Jovany Nuñez  and Guicho Castro STUDY: XR CHEST 1 VIEW;  5/21/2024 6:14 am   INDICATION: Signs/Symptoms:SOB.   COMPARISON: Chest radiograph 05/16/2024 CT chest 05/18/2024   ACCESSION NUMBER(S): [id number]   ORDERING CLINICIAN: SUN CHAMPAGNE   FINDINGS: AP radiograph of the chest was provided.   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   CARDIOMEDIASTINAL SILHOUETTE: Cardiomediastinal silhouette is stable in size with complete obscuration of the left cardiomediastinal border.   LUNGS: Persistent near-complete opacification of the left hemithorax with interval reduced air bronchograms compared to prior. Persistent patchy right infrahilar airspace  opacities are noted. Prominent perihilar and interstitial lung markings are noted on the right. No consolidation, effusion, or pneumothorax on the right.   ABDOMEN: No remarkable upper abdominal findings.   BONES: No acute osseous changes.       1. Virtually complete opacification of the left hemithorax with interval reduced air bronchograms. 2. Persistent patchy right infrahilar airspace opacities which may reflect aspiration changes and/or pneumonia in the appropriate clinical setting.   I personally reviewed the images/study and I agree with the findings as stated by Matthew Lindo MD. This study was interpreted at [place], [place].   MACRO: None   Signed by: Jovany Nuñez 5/21/2024 11:26 AM Dictation workstation:   [id number]    XR abdomen 1 view    Result Date: 5/20/2024  Interpreted By:  oSfya Ahumada, STUDY: XR ABDOMEN 1 VIEW;  5/19/2024 10:42 pm   INDICATION: Signs/Symptoms:abdominal distension.   COMPARISON: CT: 05/18/2024   ACCESSION NUMBER(S): [id number]   ORDERING CLINICIAN: SUN CHAMPAGNE   FINDINGS: Nonobstructive bowel gas pattern. Limited evaluation of pneumoperitoneum on supine imaging, however no gross evidence of free air is noted.   Whiteout throughout the left hemithorax.   Osseous structures demonstrate no acute bony changes.       1.  Nonspecific and nonobstructive gas pattern.   MACRO: None   Signed by: Sofya Ahumada 5/20/2024 8:47 AM Dictation workstation:   [id number]      This patient has a urinary catheter   Reason for the urinary catheter remaining today? urinary retention/bladder outlet obstruction, acute or chronic    Assessment/Plan   Vinicius Arriola is a 88 y.o. year old male patient with Past Medical History of  PAD, afib/flutter, hx DVT on warfarin, HTN, CAD s/p stent, mitral regurg, HLD, polycythemia vera, managed by Dr. Rothman ([facility]), JOVANNI, basal cell ca on face, s/p removal and radiation of malignant mesothelioma, S/p L  VATS with decort 5/2022  c/b post op ileus, XRT 9-10/2022, found to have a recurrence of Mesothelioma on PET scan and underwent first chemotherapy session on 5/15/2024, after which he developed fever, diarrhea, nausea an vomiting. Admitted to [facility] and treated empirically for sepsis presumed due to RLL Pneumonia iso of CT CAP showing significant collapse of the left lung worsened appearence of known Mesothelioma with concern for tumor thrombus, new chest wall invasion, right lower lobe ill-defined patchy infiltrate, as well as mild uncomplicated diverticulitis. Hospital course complicated with hypotension that responded to fluids, and new oxygen requirement. The patient is currently HDS with no indication for ICU.     5/21/24 Updates:   - INR 4.8 supratherapeutic, holding warfarin.   - contacted Dr [facility], will eventually switch warfarin to Eliquis pending INR.   - CXR 5/21 showing persistent patchy right infrahilar airspace opacities   - BP 96/57, holding IV lasix 40  - urine cx, blood cx, c diff pcr and enteric stool panel negative  - KUB 5/21, showing moderate distention of stomach with nonspecific predominantly fluid-filled bowel pattern.  - NG tube placed 5/21 and will continue to monitor output  - NPO   - holding all PO meds  - order Filgrastim 480ug daily for neutropenia d/t chemotherapy   - folic acid 1 mg daily  - code status changed to DNR/DNI during rounds- please see separate significant event note     #Fever  #Pneumonia, Right lower lobe ill-defined patchy infiltrate  #Sepsis most likely d/t pneumonia  #AHRF, improved  :: multifactorial, volume up on exam so may be component of pulmonary edema w/ pneumonia  :: at OSH met SIRS criteria (febrile, tachypnea)  :: UA 5/18 with small LE, 3+ bacteria   :: Resp Cult salivary contamination  :: MRSA nares: negative  :: Strep/Legionella antigens: negative  :: c diff and enteric stool panel: negative  :: BC 5/16 NGTD, repeat BC 5/20 negative    :: urine cx 5/21  "negative  :: CRP 30.46>42.36  :: s/p meropenem (5/19-5/20)  - Azithro (5/16-5/18)  - vanc (5/16-5/18) (5/20-)  - zosyn (5/16-5/18) (5/20-)  - CXR 5/21 showing persistent patchy right infrahilar airspace opacities    - Holding IV lasix 40    #Concern for bowel obstruction vs ileus   :: KUB 5/21, showing moderate distention of stomach with nonspecific predominantly fluid-filled bowel pattern.  :: 5/20-5/21 patient emesis that is watery, dark brown, malodorous   :: patient able to pass stool via fecal management system, loose in consistency, dark   :: did initially present w/ diarrhea could have been chemo induced vs stool ball  - NG tube placed 5/21, continue to monitor output   - holding ferrous sulfate       #VIK on CKD  # CKD (baseline Cr 1.3-1.4)  # urine retention s/p Boyd Catheter Placement by Urology in OSH  :: US renal 5/16: Nonobstructing 1 cm left renal calculus, no hydronephrosis  :: Baseline Cr 1.3-1.4  :: Cr 1.6  - will eventually need boyd removal and trial  - strict in/out  - tamsulosin 0.4 mg daily- holding iso NPO  -minimize nephrotoxic medications as able  - CTM     #Pancytopenia, most likely meidcation induced from temfexy  ::   :: s/p temfexy 5/14  -filgrastim 480 mcg daily until  ANC >1000    # Worsening mesothelioma  # tumor Thrombus  #Mesothelioma with Loculated Pleural Effusions   #Left Hilar Mass, Left Pulmonary Trunk Mass  #History JOVANNI, Pulmonary Embolism  :: primary oncologist Dr. Galvan  :: s/p L VATS w/ decort 5/2022, XRT 9-10/22  :: s/p \"half-dose\" temfexy on 5/14  :: CT chest 5/18 with significant collapse of left lung, loculated pleural effusions consistent with patient's known mesothelioma.  Also shows ill-defined patchy infiltrate in right lower lobe.  Shows left hilar mass with extension into left pulmonary trunk likely tumor thrombus   - On 3L nasal cannula, wean supplemental O2 for SpO2 goal >90%   - INR 4.8 supratherapeutic, holding warfarin, continue to monitor   - Dr." Santiago recommends transitioning to Eliquis 5mg BID, pending INR   - continuing protocol w/ 1 mg folic acid daily      #History Afib, HLD, CAD, PAD  #History DVT/PE on Warfarin  - hb downtrend likely iatrogenic iso of repeated blood draws, fluid resuscitation. Currently no sx of overt bleeding.  - would consider holding AC if significant Hb drop on follow up CBC,  - Holding home metoprolol 25 mg twice daily  - Holding warfarin, aspirin  - Continue home statin- holding iso npo     # Hospital-acquired Delirium  :: waxing and waning altered mental status  - apply delirium precautions    Msc: on Trazodone for insomnia- holding iso npo     Antibiotics:: vanc/zosyn  Dispo: pending PT/ OT snf     F: Replete PRN  E: Keep Mg >2, phos >3  and K >4  N: NPO  A: PIV  O2: On NC 3 L  DVT ppx: warfarin  Code Status: DNR/DNI   Contact: Kirsten Arriola (Spouse)  [phone] (Work Phone)        MYLA AUSTIN  Medical Student     I agree with the assessment and plan as written by the medical student,   Ernestine Owen  Internal Medicine, PGY-1

## 2024-05-21 NOTE — PROGRESS NOTES
Physical Therapy                 Therapy Communication Note    Patient Name: Vinicius Arriola  MRN: [id number]  Today's Date: 5/21/2024     Discipline: Physical Therapy    Missed Visit Reason: Missed Visit Reason:  (pt pending NG placement per RN. Will re-attempt when pt medically appropriate and schedule permits.)    Missed Time: Attempt    Claudia Ponce, PT

## 2024-05-21 NOTE — SIGNIFICANT EVENT
RADAR pg - Increased RR    Pts RR per RN and documentation has been elevated both here and at the previous facility. Pt is showing no signs of respiratory discomfort and is resting comfortably - VS rechecked    *HR 81  *RR 40  *SpO2 94%  *BS - snoring with expiratory wheeze    RN encouraged to call if needed

## 2024-05-21 NOTE — SIGNIFICANT EVENT
Rapid Response RN Note    Rapid response RN at bedside for RADAR score 7 due to the following VS: T 37.5 °Celsius; HR 79 ; RR 38; /65; SPO2 94%.     Reviewed above VS with bedside RN.  VS within patient's current trends.  No interventions by rapid response team indicated at this time.      Staff to page rapid response for any concerns or acute change in condition/VS. Jimenez Caba RN.

## 2024-05-21 NOTE — SIGNIFICANT EVENT
"Rapid Response RN Note     05/21/24 0523   Onset Documentation   Rapid Response Initiated By Radar auto page   Location/Room [facility]  ([facility] 4017)   Pager Time [phone]   Arrival Time 0523   Event End Time 0548   Primary Reason for Call Radar auto page  (score 8)     Rapid response RN at bedside for RADAR score 8 due to the following VS: T 37.2 °Celsius; ; RR 28; /60; SPO2 94% on supplemental oxygen at 3 L/m NC.     Reviewed above VS with bedside RN.  Per report and chart review, patient with multiple health conditions, including \"Mesothelioma\" (S/P VATS), \"pulmonary thrombosis,\" \"pneumonia\" and \"left lung collapse.\"      [facility] service notified of above RADAR alert.  Orders received for chest x-ray and ABG.  [facility] service to follow-up with results as indicated.  Staff to page rapid response for any concerns or acute change in condition/VS.  "

## 2024-05-22 ENCOUNTER — APPOINTMENT (OUTPATIENT)
Dept: RADIOLOGY | Facility: HOSPITAL | Age: 89
DRG: 871 | End: 2024-05-22
Payer: MEDICARE

## 2024-05-22 ENCOUNTER — APPOINTMENT (OUTPATIENT)
Dept: RADIOLOGY | Facility: HOSPITAL | Age: 89
End: 2024-05-22
Payer: MEDICARE

## 2024-05-22 LAB
ABO GROUP (TYPE) IN BLOOD: NORMAL
ALBUMIN SERPL BCP-MCNC: 2.5 G/DL (ref 3.4–5)
ALP SERPL-CCNC: 56 U/L (ref 33–136)
ALT SERPL W P-5'-P-CCNC: 51 U/L (ref 10–52)
ANION GAP BLDA CALCULATED.4IONS-SCNC: 11 MMO/L (ref 10–25)
ANION GAP BLDV CALCULATED.4IONS-SCNC: 11 MMOL/L (ref 10–25)
ANION GAP BLDV CALCULATED.4IONS-SCNC: 13 MMOL/L (ref 10–25)
ANION GAP SERPL CALC-SCNC: 18 MMOL/L (ref 10–20)
ANTIBODY SCREEN: NORMAL
APTT PPP: 51 SECONDS (ref 27–38)
AST SERPL W P-5'-P-CCNC: 96 U/L (ref 9–39)
BACTERIA BLD CULT: NORMAL
BASE EXCESS BLDA CALC-SCNC: 3.2 MMOL/L (ref -2–3)
BASE EXCESS BLDV CALC-SCNC: 2.6 MMOL/L (ref -2–3)
BASE EXCESS BLDV CALC-SCNC: 3 MMOL/L (ref -2–3)
BASOPHILS # BLD MANUAL: 0 X10*3/UL (ref 0–0.1)
BASOPHILS NFR BLD MANUAL: 0 %
BILIRUB SERPL-MCNC: 0.7 MG/DL (ref 0–1.2)
BODY TEMPERATURE: 37 DEGREES CELSIUS
BUN SERPL-MCNC: 42 MG/DL (ref 6–23)
CA-I BLDA-SCNC: 1.12 MMOL/L (ref 1.1–1.33)
CA-I BLDV-SCNC: 1.12 MMOL/L (ref 1.1–1.33)
CA-I BLDV-SCNC: 1.14 MMOL/L (ref 1.1–1.33)
CALCIUM SERPL-MCNC: 8.6 MG/DL (ref 8.6–10.6)
CHLORIDE BLDA-SCNC: 105 MMOL/L (ref 98–107)
CHLORIDE BLDV-SCNC: 102 MMOL/L (ref 98–107)
CHLORIDE BLDV-SCNC: 106 MMOL/L (ref 98–107)
CHLORIDE SERPL-SCNC: 100 MMOL/L (ref 98–107)
CO2 SERPL-SCNC: 27 MMOL/L (ref 21–32)
CREAT SERPL-MCNC: 2.26 MG/DL (ref 0.5–1.3)
D DIMER PPP FEU-MCNC: 1521 NG/ML FEU
EGFRCR SERPLBLD CKD-EPI 2021: 28 ML/MIN/1.73M*2
EOSINOPHIL # BLD MANUAL: 0.02 X10*3/UL (ref 0–0.4)
EOSINOPHIL NFR BLD MANUAL: 1.5 %
ERYTHROCYTE [DISTWIDTH] IN BLOOD BY AUTOMATED COUNT: 16.4 % (ref 11.5–14.5)
ERYTHROCYTE [DISTWIDTH] IN BLOOD BY AUTOMATED COUNT: 16.5 % (ref 11.5–14.5)
FIBRINOGEN PPP-MCNC: >1000 MG/DL (ref 200–400)
GLUCOSE BLD MANUAL STRIP-MCNC: 112 MG/DL (ref 74–99)
GLUCOSE BLDA-MCNC: 120 MG/DL (ref 74–99)
GLUCOSE BLDV-MCNC: 110 MG/DL (ref 74–99)
GLUCOSE BLDV-MCNC: 110 MG/DL (ref 74–99)
GLUCOSE SERPL-MCNC: 114 MG/DL (ref 74–99)
HCO3 BLDA-SCNC: 25.9 MMOL/L (ref 22–26)
HCO3 BLDV-SCNC: 25.7 MMOL/L (ref 22–26)
HCO3 BLDV-SCNC: 26.9 MMOL/L (ref 22–26)
HCT VFR BLD AUTO: 28.7 % (ref 41–52)
HCT VFR BLD AUTO: 28.7 % (ref 41–52)
HCT VFR BLD EST: 23 % (ref 41–52)
HCT VFR BLD EST: 24 % (ref 41–52)
HCT VFR BLD EST: 29 % (ref 41–52)
HGB BLD-MCNC: 9.3 G/DL (ref 13.5–17.5)
HGB BLD-MCNC: 9.4 G/DL (ref 13.5–17.5)
HGB BLDA-MCNC: 7.9 G/DL (ref 13.5–17.5)
HGB BLDV-MCNC: 7.8 G/DL (ref 13.5–17.5)
HGB BLDV-MCNC: 9.7 G/DL (ref 13.5–17.5)
IMM GRANULOCYTES # BLD AUTO: 0.12 X10*3/UL (ref 0–0.5)
IMM GRANULOCYTES NFR BLD AUTO: 7.3 % (ref 0–0.9)
INHALED O2 CONCENTRATION: 21 %
INHALED O2 CONCENTRATION: 21 %
INHALED O2 CONCENTRATION: 40 %
INR PPP: 2.4 (ref 0.9–1.1)
INR PPP: 6.3 (ref 0.9–1.1)
INR PPP: 6.3 (ref 0.9–1.1)
LACTATE BLDA-SCNC: 2.2 MMOL/L (ref 0.4–2)
LACTATE BLDV-SCNC: 1.7 MMOL/L (ref 0.4–2)
LACTATE BLDV-SCNC: 2.6 MMOL/L (ref 0.4–2)
LDH SERPL L TO P-CCNC: 307 U/L (ref 84–246)
LYMPHOCYTES # BLD MANUAL: 0.38 X10*3/UL (ref 0.8–3)
LYMPHOCYTES NFR BLD MANUAL: 23.5 %
MAGNESIUM SERPL-MCNC: 2.27 MG/DL (ref 1.6–2.4)
MCH RBC QN AUTO: 28.3 PG (ref 26–34)
MCH RBC QN AUTO: 28.7 PG (ref 26–34)
MCHC RBC AUTO-ENTMCNC: 32.4 G/DL (ref 32–36)
MCHC RBC AUTO-ENTMCNC: 32.8 G/DL (ref 32–36)
MCV RBC AUTO: 87 FL (ref 80–100)
MCV RBC AUTO: 88 FL (ref 80–100)
MONOCYTES # BLD MANUAL: 0.1 X10*3/UL (ref 0.05–0.8)
MONOCYTES NFR BLD MANUAL: 6.1 %
NEUTROPHILS # BLD MANUAL: 1.1 X10*3/UL (ref 1.6–5.5)
NEUTS BAND # BLD MANUAL: 0.17 X10*3/UL (ref 0–0.5)
NEUTS BAND NFR BLD MANUAL: 10.6 %
NEUTS SEG # BLD MANUAL: 0.93 X10*3/UL (ref 1.6–5)
NEUTS SEG NFR BLD MANUAL: 58.3 %
NRBC BLD-RTO: 0 /100 WBCS (ref 0–0)
NRBC BLD-RTO: 0 /100 WBCS (ref 0–0)
OVALOCYTES BLD QL SMEAR: ABNORMAL
OXYHGB MFR BLDA: 97.8 % (ref 94–98)
OXYHGB MFR BLDV: 77.9 % (ref 45–75)
OXYHGB MFR BLDV: 97.3 % (ref 45–75)
PCO2 BLDA: 31 MM HG (ref 38–42)
PCO2 BLDV: 33 MM HG (ref 41–51)
PCO2 BLDV: 37 MM HG (ref 41–51)
PH BLDA: 7.53 PH (ref 7.38–7.42)
PH BLDV: 7.47 PH (ref 7.33–7.43)
PH BLDV: 7.5 PH (ref 7.33–7.43)
PHOSPHATE SERPL-MCNC: 2.7 MG/DL (ref 2.5–4.9)
PLATELET # BLD AUTO: 55 X10*3/UL (ref 150–450)
PLATELET # BLD AUTO: 57 X10*3/UL (ref 150–450)
PO2 BLDA: 109 MM HG (ref 85–95)
PO2 BLDV: 101 MM HG (ref 35–45)
PO2 BLDV: 44 MM HG (ref 35–45)
POLYCHROMASIA BLD QL SMEAR: ABNORMAL
POTASSIUM BLDA-SCNC: 3.4 MMOL/L (ref 3.5–5.3)
POTASSIUM BLDV-SCNC: 3.5 MMOL/L (ref 3.5–5.3)
POTASSIUM BLDV-SCNC: 3.6 MMOL/L (ref 3.5–5.3)
POTASSIUM SERPL-SCNC: 3.7 MMOL/L (ref 3.5–5.3)
PROT SERPL-MCNC: 5.9 G/DL (ref 6.4–8.2)
PROTHROMBIN TIME: 27.6 SECONDS (ref 9.8–12.8)
PROTHROMBIN TIME: 72.2 SECONDS (ref 9.8–12.8)
PROTHROMBIN TIME: 72.2 SECONDS (ref 9.8–12.8)
RBC # BLD AUTO: 3.28 X10*6/UL (ref 4.5–5.9)
RBC # BLD AUTO: 3.29 X10*6/UL (ref 4.5–5.9)
RBC MORPH BLD: ABNORMAL
RH FACTOR (ANTIGEN D): NORMAL
SAO2 % BLDA: 100 % (ref 94–100)
SAO2 % BLDV: 100 % (ref 45–75)
SAO2 % BLDV: 80 % (ref 45–75)
SODIUM BLDA-SCNC: 138 MMOL/L (ref 136–145)
SODIUM BLDV-SCNC: 137 MMOL/L (ref 136–145)
SODIUM BLDV-SCNC: 140 MMOL/L (ref 136–145)
SODIUM SERPL-SCNC: 141 MMOL/L (ref 136–145)
TOTAL CELLS COUNTED BLD: 132
VANCOMYCIN SERPL-MCNC: 22.5 UG/ML (ref 5–20)
WBC # BLD AUTO: 1.2 X10*3/UL (ref 4.4–11.3)
WBC # BLD AUTO: 1.6 X10*3/UL (ref 4.4–11.3)

## 2024-05-22 PROCEDURE — 87075 CULTR BACTERIA EXCEPT BLOOD: CPT | Mod: 91

## 2024-05-22 PROCEDURE — 84100 ASSAY OF PHOSPHORUS: CPT

## 2024-05-22 PROCEDURE — 85730 THROMBOPLASTIN TIME PARTIAL: CPT | Mod: 91

## 2024-05-22 PROCEDURE — 1200000003 HC ONCOLOGY  ROOM WITH TELEMETRY DAILY

## 2024-05-22 PROCEDURE — 85610 PROTHROMBIN TIME: CPT

## 2024-05-22 PROCEDURE — 1200000002 HC GENERAL ROOM WITH TELEMETRY DAILY

## 2024-05-22 PROCEDURE — 2500000004 HC RX 250 GENERAL PHARMACY W/ HCPCS (ALT 636 FOR OP/ED): Performed by: STUDENT IN AN ORGANIZED HEALTH CARE EDUCATION/TRAINING PROGRAM

## 2024-05-22 PROCEDURE — 83605 ASSAY OF LACTIC ACID: CPT | Mod: 91,MUE

## 2024-05-22 PROCEDURE — 36415 COLL VENOUS BLD VENIPUNCTURE: CPT

## 2024-05-22 PROCEDURE — 2500000004 HC RX 250 GENERAL PHARMACY W/ HCPCS (ALT 636 FOR OP/ED)

## 2024-05-22 PROCEDURE — 84132 ASSAY OF SERUM POTASSIUM: CPT | Mod: 91

## 2024-05-22 PROCEDURE — 85610 PROTHROMBIN TIME: CPT | Mod: 91

## 2024-05-22 PROCEDURE — 85379 FIBRIN DEGRADATION QUANT: CPT | Performed by: STUDENT IN AN ORGANIZED HEALTH CARE EDUCATION/TRAINING PROGRAM

## 2024-05-22 PROCEDURE — 85007 BL SMEAR W/DIFF WBC COUNT: CPT

## 2024-05-22 PROCEDURE — 99233 SBSQ HOSP IP/OBS HIGH 50: CPT

## 2024-05-22 PROCEDURE — 86850 RBC ANTIBODY SCREEN: CPT | Mod: 91

## 2024-05-22 PROCEDURE — C9113 INJ PANTOPRAZOLE SODIUM, VIA: HCPCS | Performed by: STUDENT IN AN ORGANIZED HEALTH CARE EDUCATION/TRAINING PROGRAM

## 2024-05-22 PROCEDURE — 74176 CT ABD & PELVIS W/O CONTRAST: CPT | Performed by: RADIOLOGY

## 2024-05-22 PROCEDURE — 36600 WITHDRAWAL OF ARTERIAL BLOOD: CPT

## 2024-05-22 PROCEDURE — 2500000002 HC RX 250 W HCPCS SELF ADMINISTERED DRUGS (ALT 637 FOR MEDICARE OP, ALT 636 FOR OP/ED)

## 2024-05-22 PROCEDURE — 82947 ASSAY GLUCOSE BLOOD QUANT: CPT

## 2024-05-22 PROCEDURE — 85384 FIBRINOGEN ACTIVITY: CPT | Performed by: STUDENT IN AN ORGANIZED HEALTH CARE EDUCATION/TRAINING PROGRAM

## 2024-05-22 PROCEDURE — 94640 AIRWAY INHALATION TREATMENT: CPT | Mod: MUE

## 2024-05-22 PROCEDURE — 87040 BLOOD CULTURE FOR BACTERIA: CPT

## 2024-05-22 PROCEDURE — 5A0945A ASSISTANCE WITH RESPIRATORY VENTILATION, 24-96 CONSECUTIVE HOURS, HIGH NASAL FLOW/VELOCITY: ICD-10-PCS | Performed by: STUDENT IN AN ORGANIZED HEALTH CARE EDUCATION/TRAINING PROGRAM

## 2024-05-22 PROCEDURE — 85027 COMPLETE CBC AUTOMATED: CPT

## 2024-05-22 PROCEDURE — 83735 ASSAY OF MAGNESIUM: CPT

## 2024-05-22 PROCEDURE — C9113 INJ PANTOPRAZOLE SODIUM, VIA: HCPCS

## 2024-05-22 PROCEDURE — 83010 ASSAY OF HAPTOGLOBIN QUANT: CPT | Performed by: STUDENT IN AN ORGANIZED HEALTH CARE EDUCATION/TRAINING PROGRAM

## 2024-05-22 PROCEDURE — 82435 ASSAY OF BLOOD CHLORIDE: CPT

## 2024-05-22 PROCEDURE — 71045 X-RAY EXAM CHEST 1 VIEW: CPT | Performed by: RADIOLOGY

## 2024-05-22 PROCEDURE — 80202 ASSAY OF VANCOMYCIN: CPT

## 2024-05-22 PROCEDURE — 2500000005 HC RX 250 GENERAL PHARMACY W/O HCPCS

## 2024-05-22 PROCEDURE — 83615 LACTATE (LD) (LDH) ENZYME: CPT | Performed by: STUDENT IN AN ORGANIZED HEALTH CARE EDUCATION/TRAINING PROGRAM

## 2024-05-22 PROCEDURE — 74176 CT ABD & PELVIS W/O CONTRAST: CPT

## 2024-05-22 PROCEDURE — 2500000005 HC RX 250 GENERAL PHARMACY W/O HCPCS: Performed by: STUDENT IN AN ORGANIZED HEALTH CARE EDUCATION/TRAINING PROGRAM

## 2024-05-22 PROCEDURE — 71250 CT THORAX DX C-: CPT | Performed by: RADIOLOGY

## 2024-05-22 PROCEDURE — 71045 X-RAY EXAM CHEST 1 VIEW: CPT

## 2024-05-22 RX ORDER — ACETAMINOPHEN 650 MG/1
650 SUPPOSITORY RECTAL EVERY 4 HOURS PRN
Status: DISCONTINUED | OUTPATIENT
Start: 2024-05-22 | End: 2024-05-23

## 2024-05-22 RX ORDER — PANTOPRAZOLE SODIUM 40 MG/10ML
40 INJECTION, POWDER, LYOPHILIZED, FOR SOLUTION INTRAVENOUS 2 TIMES DAILY
Status: DISCONTINUED | OUTPATIENT
Start: 2024-05-22 | End: 2024-06-06 | Stop reason: ALTCHOICE

## 2024-05-22 RX ORDER — ACETAMINOPHEN 325 MG/1
650 TABLET ORAL EVERY 4 HOURS PRN
Status: DISCONTINUED | OUTPATIENT
Start: 2024-05-22 | End: 2024-05-23

## 2024-05-22 RX ORDER — ACETAMINOPHEN 160 MG/5ML
650 SOLUTION ORAL EVERY 4 HOURS PRN
Status: DISCONTINUED | OUTPATIENT
Start: 2024-05-22 | End: 2024-05-23

## 2024-05-22 RX ORDER — PANTOPRAZOLE SODIUM 40 MG/10ML
80 INJECTION, POWDER, LYOPHILIZED, FOR SOLUTION INTRAVENOUS DAILY
Status: DISCONTINUED | OUTPATIENT
Start: 2024-05-22 | End: 2024-05-22

## 2024-05-22 RX ORDER — ACETAMINOPHEN 650 MG/1
650 SUPPOSITORY RECTAL ONCE
Status: DISCONTINUED | OUTPATIENT
Start: 2024-05-22 | End: 2024-05-23

## 2024-05-22 RX ADMIN — Medication 50 L/MIN: at 11:15

## 2024-05-22 RX ADMIN — Medication 3 L/MIN: at 08:00

## 2024-05-22 RX ADMIN — IPRATROPIUM BROMIDE AND ALBUTEROL SULFATE 3 ML: .5; 3 SOLUTION RESPIRATORY (INHALATION) at 05:01

## 2024-05-22 RX ADMIN — SODIUM CHLORIDE, POTASSIUM CHLORIDE, SODIUM LACTATE AND CALCIUM CHLORIDE 500 ML: 600; 310; 30; 20 INJECTION, SOLUTION INTRAVENOUS at 22:57

## 2024-05-22 RX ADMIN — PIPERACILLIN SODIUM AND TAZOBACTAM SODIUM 4.5 G: 4; .5 INJECTION, SOLUTION INTRAVENOUS at 03:55

## 2024-05-22 RX ADMIN — SODIUM CHLORIDE, POTASSIUM CHLORIDE, SODIUM LACTATE AND CALCIUM CHLORIDE 500 ML: 600; 310; 30; 20 INJECTION, SOLUTION INTRAVENOUS at 17:30

## 2024-05-22 RX ADMIN — SODIUM CHLORIDE, POTASSIUM CHLORIDE, SODIUM LACTATE AND CALCIUM CHLORIDE 1000 ML: 600; 310; 30; 20 INJECTION, SOLUTION INTRAVENOUS at 19:00

## 2024-05-22 RX ADMIN — PANTOPRAZOLE SODIUM 80 MG: 40 INJECTION, POWDER, FOR SOLUTION INTRAVENOUS at 09:02

## 2024-05-22 RX ADMIN — PANTOPRAZOLE SODIUM 40 MG: 40 INJECTION, POWDER, FOR SOLUTION INTRAVENOUS at 21:40

## 2024-05-22 RX ADMIN — IPRATROPIUM BROMIDE AND ALBUTEROL SULFATE 3 ML: .5; 3 SOLUTION RESPIRATORY (INHALATION) at 10:31

## 2024-05-22 RX ADMIN — PHYTONADIONE 5 MG: 10 INJECTION, EMULSION INTRAMUSCULAR; INTRAVENOUS; SUBCUTANEOUS at 10:38

## 2024-05-22 RX ADMIN — IPRATROPIUM BROMIDE AND ALBUTEROL SULFATE 3 ML: .5; 3 SOLUTION RESPIRATORY (INHALATION) at 01:40

## 2024-05-22 RX ADMIN — MICAFUNGIN SODIUM 100 MG: 100 INJECTION, POWDER, LYOPHILIZED, FOR SOLUTION INTRAVENOUS at 12:30

## 2024-05-22 RX ADMIN — Medication 40 PERCENT: at 20:00

## 2024-05-22 RX ADMIN — PHYTONADIONE 5 MG: 10 INJECTION, EMULSION INTRAMUSCULAR; INTRAVENOUS; SUBCUTANEOUS at 04:05

## 2024-05-22 RX ADMIN — FILGRASTIM-SNDZ 480 MCG: 480 INJECTION, SOLUTION INTRAVENOUS; SUBCUTANEOUS at 12:29

## 2024-05-22 RX ADMIN — MEROPENEM 2 G: 1 INJECTION, POWDER, FOR SOLUTION INTRAVENOUS at 14:22

## 2024-05-22 RX ADMIN — Medication 50 L/MIN: at 22:02

## 2024-05-22 RX ADMIN — IPRATROPIUM BROMIDE AND ALBUTEROL SULFATE 3 ML: .5; 3 SOLUTION RESPIRATORY (INHALATION) at 14:27

## 2024-05-22 RX ADMIN — IPRATROPIUM BROMIDE AND ALBUTEROL SULFATE 3 ML: .5; 3 SOLUTION RESPIRATORY (INHALATION) at 22:02

## 2024-05-22 RX ADMIN — NYSTATIN 1 APPLICATION: 100000 POWDER TOPICAL at 09:00

## 2024-05-22 ASSESSMENT — PAIN SCALES - WONG BAKER: WONGBAKER_NUMERICALRESPONSE: NO HURT

## 2024-05-22 ASSESSMENT — PAIN SCALES - GENERAL: PAINLEVEL_OUTOF10: 0 - NO PAIN

## 2024-05-22 NOTE — CARE PLAN
Problem: Fall/Injury  Goal: Not fall by end of shift  Outcome: Progressing  Goal: Be free from injury by end of the shift  Outcome: Progressing  Goal: Verbalize understanding of personal risk factors for fall in the hospital  Outcome: Progressing  Goal: Verbalize understanding of risk factor reduction measures to prevent injury from fall in the home  Outcome: Progressing  Goal: Use assistive devices by end of the shift  Outcome: Progressing  Goal: Pace activities to prevent fatigue by end of the shift  Outcome: Progressing     Problem: Skin  Goal: Prevent/manage excess moisture  Outcome: Progressing  Flowsheets (Taken 5/22/2024 1425)  Prevent/manage excess moisture: Cleanse incontinence/protect with barrier cream  Goal: Prevent/minimize sheer/friction injuries  Outcome: Progressing  Flowsheets (Taken 5/22/2024 1425)  Prevent/minimize sheer/friction injuries:   HOB 30 degrees or less   Turn/reposition every 2 hours/use positioning/transfer devices   Use pull sheet  Goal: Promote/optimize nutrition  Outcome: Progressing  Flowsheets (Taken 5/22/2024 1425)  Promote/optimize nutrition: Discuss with provider if NPO > 2 days  Goal: Promote skin healing  Outcome: Progressing  Flowsheets (Taken 5/22/2024 1425)  Promote skin healing:   Assess skin/pad under line(s)/device(s)   Protective dressings over bony prominences   Turn/reposition every 2 hours/use positioning/transfer devices   Ensure correct size (line/device) and apply per  instructions   Rotate device position/do not position patient on device   The patient's goals for the shift include      The clinical goals for the shift include pt will remain safe druing shift on 05/22

## 2024-05-22 NOTE — PROGRESS NOTES
Physical Therapy                 Therapy Communication Note    Patient Name: Vinicius Arriola  MRN: [id number]  Today's Date: 5/22/2024     Discipline: Physical Therapy    Missed Visit Reason: Missed Visit Reason: Patient placed on medical hold (per RN, pt not medically appropriate for PT.)    Missed Time: Attempt    Claudia Ponce, PT

## 2024-05-22 NOTE — PROGRESS NOTES
"Vinicius Arriola is a 88 y.o. male on day 3 of admission presenting with 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Multi).    Subjective   Overnight, patient pulled out NG tube; was later replaced by bedside RN. Sitter assigned.     Rapid Response called at 130 am due to T 38.3, , RR 44, /78, 95% on 4L. VBG obtained: pH 7.5, pCO2 33, pO2 44, HCO3 25.7, SaO2 80%. CBC showed stable Hgb, and INR increased to 6.4. 5 mg IV vitamin K was administered.     Patient reports no changes in dyspnea or cough. Denies nausea, weakness, emesis. Endorses improvement in bloating sensation.        Objective   Physical Exam   Gen: well appearing, A+Ox3, in no acute distress  HEENT: EOMI, sclera anicteric, no conjunctival injection, MMM  Resp: CTAB, decrease breath sounds on the left side, crackles on the right side  CV: RRR, no m/r/g. normal S1/S2  GI: Distended, diffuse tenderness throughout (better than yesterday), dull to percussion. no rebound or guarding, FMS tube in place w/ noted dark stool c/f melena, NGT in place draining coffee ground fluid  Extremities/MSK: warm and well perfused. 1+ left lower extremity pitting edema noted to mid calf, accompanied by erythema and warm to palpation.     Neuro: A+Ox3.  Skin: warm and dry, no lesions, no rashes  : boyd with pink-tinged urine    Last Recorded Vitals  Blood pressure 135/61, pulse (!) 136, temperature 35.7 °C (96.3 °F), resp. rate (!) 42, height 1.727 m (5' 7.99\"), weight 110 kg (242 lb 8.1 oz), SpO2 100%.  Intake/Output last 3 Shifts:  I/O last 3 completed shifts:  In: 925 (8.4 mL/kg) [IV Piggyback:925]  Out: 2900 (26.4 mL/kg) [Urine:1825 (0.5 mL/kg/hr); Emesis/NG output:1075]  Weight: 110 kg        Scheduled medications  acetaminophen, 650 mg, rectal, Once  [Held by provider] ascorbic acid, 1,000 mg, oral, Daily  [Held by provider] aspirin, 81 mg, oral, Nightly  [Held by provider] ergocalciferol, 1,250 mcg, oral, Every Sunday  [Held by provider] ferrous sulfate (325 mg ferrous sulfate), 65 mg " of iron, oral, BID  filgrastim or biosimilar, 480 mcg, subcutaneous, q24h  ipratropium-albuteroL, 3 mL, nebulization, q4h  meropenem, 2 g, intravenous, q12h  micafungin, 100 mg, intravenous, q24h  nystatin, 1 Application, Topical, BID  oxygen, , inhalation, Continuous - Inhalation  oxygen, , inhalation, Continuous - Inhalation  pantoprazole, 80 mg, intravenous, Daily  perflutren protein A microsphere, 0.5 mL, intravenous, Once in imaging  [Held by provider] rosuvastatin, 10 mg, oral, Nightly  sulfur hexafluoride microsphr, 2 mL, intravenous, Once in imaging  [Held by provider] tamsulosin, 0.4 mg, oral, Daily  [Held by provider] traZODone, 100 mg, oral, Nightly  [Held by provider] warfarin, 2 mg, oral, Daily      PRN medications  PRN medications: acetaminophen **OR** acetaminophen **OR** acetaminophen, albuterol, carboxymethylcellulose, diclofenac sodium, guaiFENesin, melatonin, [Held by provider] nitroglycerin, ondansetron **OR** ondansetron, vancomycin     Relevant Results  Results for orders placed or performed during the hospital encounter of 05/19/24 (from the past 24 hour(s))   Transthoracic Echo (TTE) Complete   Result Value Ref Range    AV pk zabrina 1.82 m/s    LVOT diam 2.00 cm    LV Biplane EF 79 %    Tricuspid annular plane systolic excursion 1.6 cm    RV free wall pk S' 18.20 cm/s    LVIDd 4.10 cm    RVSP 36.6 mmHg    Aortic Valve Area by Continuity of Peak Velocity 2.45 cm2    AV pk grad 13.2 mmHg    LV A4C EF 73.6    Blood Gas Venous Full Panel   Result Value Ref Range    POCT pH, Venous 7.50 (H) 7.33 - 7.43 pH    POCT pCO2, Venous 33 (L) 41 - 51 mm Hg    POCT pO2, Venous 44 35 - 45 mm Hg    POCT SO2, Venous 80 (H) 45 - 75 %    POCT Oxy Hemoglobin, Venous 77.9 (H) 45.0 - 75.0 %    POCT Hematocrit Calculated, Venous 29.0 (L) 41.0 - 52.0 %    POCT Sodium, Venous 137 136 - 145 mmol/L    POCT Potassium, Venous 3.5 3.5 - 5.3 mmol/L    POCT Chloride, Venous 102 98 - 107 mmol/L    POCT Ionized Calicum, Venous  1.14 1.10 - 1.33 mmol/L    POCT Glucose, Venous 110 (H) 74 - 99 mg/dL    POCT Lactate, Venous 1.7 0.4 - 2.0 mmol/L    POCT Base Excess, Venous 2.6 -2.0 - 3.0 mmol/L    POCT HCO3 Calculated, Venous 25.7 22.0 - 26.0 mmol/L    POCT Hemoglobin, Venous 9.7 (L) 13.5 - 17.5 g/dL    POCT Anion Gap, Venous 13.0 10.0 - 25.0 mmol/L    Patient Temperature 37.0 degrees Celsius    FiO2 21 %   POCT GLUCOSE   Result Value Ref Range    POCT Glucose 112 (H) 74 - 99 mg/dL   Blood Culture    Specimen: Peripheral Venipuncture; Blood culture   Result Value Ref Range    Blood Culture Loaded on Instrument - Culture in progress    Blood Culture    Specimen: Peripheral Venipuncture; Blood culture   Result Value Ref Range    Blood Culture Loaded on Instrument - Culture in progress    CBC   Result Value Ref Range    WBC 1.2 (L) 4.4 - 11.3 x10*3/uL    nRBC 0.0 0.0 - 0.0 /100 WBCs    RBC 3.29 (L) 4.50 - 5.90 x10*6/uL    Hemoglobin 9.3 (L) 13.5 - 17.5 g/dL    Hematocrit 28.7 (L) 41.0 - 52.0 %    MCV 87 80 - 100 fL    MCH 28.3 26.0 - 34.0 pg    MCHC 32.4 32.0 - 36.0 g/dL    RDW 16.4 (H) 11.5 - 14.5 %    Platelets 57 (L) 150 - 450 x10*3/uL   Coagulation Screen   Result Value Ref Range    Protime 72.2 (HH) 9.8 - 12.8 seconds    INR 6.3 (HH) 0.9 - 1.1    aPTT 51 (H) 27 - 38 seconds   Protime-INR   Result Value Ref Range    Protime 72.2 (HH) 9.8 - 12.8 seconds    INR 6.3 (HH) 0.9 - 1.1   Vancomycin   Result Value Ref Range    Vancomycin 22.5 (H) 5.0 - 20.0 ug/mL   Comprehensive Metabolic Panel   Result Value Ref Range    Glucose 114 (H) 74 - 99 mg/dL    Sodium 141 136 - 145 mmol/L    Potassium 3.7 3.5 - 5.3 mmol/L    Chloride 100 98 - 107 mmol/L    Bicarbonate 27 21 - 32 mmol/L    Anion Gap 18 10 - 20 mmol/L    Urea Nitrogen 42 (H) 6 - 23 mg/dL    Creatinine 2.26 (H) 0.50 - 1.30 mg/dL    eGFR 28 (L) >60 mL/min/1.73m*2    Calcium 8.6 8.6 - 10.6 mg/dL    Albumin 2.5 (L) 3.4 - 5.0 g/dL    Alkaline Phosphatase 56 33 - 136 U/L    Total Protein 5.9 (L) 6.4  - 8.2 g/dL    AST 96 (H) 9 - 39 U/L    Bilirubin, Total 0.7 0.0 - 1.2 mg/dL    ALT 51 10 - 52 U/L   CBC and Auto Differential   Result Value Ref Range    WBC 1.6 (L) 4.4 - 11.3 x10*3/uL    nRBC 0.0 0.0 - 0.0 /100 WBCs    RBC 3.28 (L) 4.50 - 5.90 x10*6/uL    Hemoglobin 9.4 (L) 13.5 - 17.5 g/dL    Hematocrit 28.7 (L) 41.0 - 52.0 %    MCV 88 80 - 100 fL    MCH 28.7 26.0 - 34.0 pg    MCHC 32.8 32.0 - 36.0 g/dL    RDW 16.5 (H) 11.5 - 14.5 %    Platelets 55 (L) 150 - 450 x10*3/uL    Immature Granulocytes %, Automated 7.3 (H) 0.0 - 0.9 %    Immature Granulocytes Absolute, Automated 0.12 0.00 - 0.50 x10*3/uL   Magnesium   Result Value Ref Range    Magnesium 2.27 1.60 - 2.40 mg/dL   Phosphorus   Result Value Ref Range    Phosphorus 2.7 2.5 - 4.9 mg/dL   Fibrinogen   Result Value Ref Range    Fibrinogen >1,000 (HH) 200 - 400 mg/dL   Lactate Dehydrogenase   Result Value Ref Range     (H) 84 - 246 U/L   D-dimer, Non VTE   Result Value Ref Range    D-Dimer Non VTE, Quant (ng/mL FEU) 1,521 (H) <=500 ng/mL FEU   Manual Differential   Result Value Ref Range    Neutrophils %, Manual 58.3 40.0 - 80.0 %    Bands %, Manual 10.6 0.0 - 5.0 %    Lymphocytes %, Manual 23.5 13.0 - 44.0 %    Monocytes %, Manual 6.1 2.0 - 10.0 %    Eosinophils %, Manual 1.5 0.0 - 6.0 %    Basophils %, Manual 0.0 0.0 - 2.0 %    Seg Neutrophils Absolute, Manual 0.93 (L) 1.60 - 5.00 x10*3/uL    Bands Absolute, Manual 0.17 0.00 - 0.50 x10*3/uL    Lymphocytes Absolute, Manual 0.38 (L) 0.80 - 3.00 x10*3/uL    Monocytes Absolute, Manual 0.10 0.05 - 0.80 x10*3/uL    Eosinophils Absolute, Manual 0.02 0.00 - 0.40 x10*3/uL    Basophils Absolute, Manual 0.00 0.00 - 0.10 x10*3/uL    Total Cells Counted 132     Neutrophils Absolute, Manual 1.10 (L) 1.60 - 5.50 x10*3/uL    RBC Morphology See Below     Polychromasia Mild     Ovalocytes Few    Protime-INR   Result Value Ref Range    Protime 27.6 (H) 9.8 - 12.8 seconds    INR 2.4 (H) 0.9 - 1.1      Transthoracic Echo  (TTE) Complete    Result Date: 5/21/2024   [facility] (formerly [facility]), [address] [place] [address]                Tel [phone] and Fax [phone] TRANSTHORACIC ECHOCARDIOGRAM REPORT  Patient Name:      ZAKIYA LAWSON       Reading Physician:    [id number] Delvis Miller MD Study Date:        5/21/2024            Ordering Provider:    [id number] CHAMPAGNE MRN/PID:           [id number]             Fellow: Accession#:        [id number]         Nurse: Date of Birth/Age: 1935 / 88 years Sonographer:          Dionisio Braga RDCS Gender:            M                    Additional Staff: Height:            172.72 cm            Admit Date: Weight:            109.77 kg            Admission Status:     Inpatient -                                                               Routine BSA / BMI:         2.22 m2 / 36.80      Encounter#:           [id number]                    kg/m2                                         Department Location:  [place] [address] Blood Pressure: 96 /57 mmHg Study Type:    TRANSTHORACIC ECHO (TTE) COMPLETE Diagnosis/ICD: Unspecified atrial fibrillation-I48.91; Hypertensive heart                disease with heart failure-I11.0 Indication:    hypoxic resp failure w/ new o2 requirement c/f HF CPT Code:      Echo Complete w Full Doppler-[id number] Patient History: Pertinent History: PAD, afib/flutter, hx DVT on warfarin, HTN, CAD s/p stent,                    mitral regurg, HLD,JOVANNI, basal cell ca on face, s/p removal                    and radiation of malignant mesothelioma, S/p L VATS with                    decort 5/2022. Study Detail: The following Echo studies were performed: 2D, M-Mode, color flow               and Doppler. Technically challenging study due to body habitus,               patient lying in  supine position, poor acoustic windows and               prominent lung artifact. Definity used as a contrast agent for               endocardial border definition. Total contrast used for this               procedure was 2.0 mL via IV push. Unable to obtain suprasternal               notch view.  PHYSICIAN INTERPRETATION: Left Ventricle: The left ventricular systolic function is hyperdynamic, with an estimated ejection fraction of 70-75%. The patient is in atrial fibrillation which may influence the estimate of left ventricular function and transvalvular flows. There are no regional wall motion abnormalities. The left ventricular cavity size is normal. Left ventricular diastolic filling was not assessed. Left Atrium: The left atrium was not well visualized. Right Ventricle: The right ventricle was not well visualized. Unable to determine right ventricular systolic function. Right Atrium: The right atrium was not well visualized. Aortic Valve: The aortic valve is trileaflet. There is trivial aortic valve regurgitation. The peak instantaneous gradient of the aortic valve is 13.2 mmHg. Mitral Valve: The mitral valve is normal in structure. There is mild mitral annular calcification. There is trace mitral valve regurgitation. Tricuspid Valve: The tricuspid valve was not well visualized. Tricuspid regurgitation was not well visualized. Tricuspid regurgitation was not assessed. The Doppler estimated RVSP is mildly elevated at 36.6 mmHg. Pulmonic Valve: The pulmonic valve is not well visualized. There is physiologic pulmonic valve regurgitation. Pericardium: There is a trivial pericardial effusion. Aorta: The aortic root is abnormal. The aortic root appears moderately dilated and measures 4.50 cm. The Asc Ao is 3.70 cm. There is mild dilatation of the ascending aorta. Systemic Veins: The inferior vena cava appears to be of normal size. There is IVC inspiratory collapse greater than 50%. In comparison to the previous  echocardiogram(s): Compared with study from 5/3/2022, the previous study was a MARY during a MARY/DCCV and comparison is limited.  CONCLUSIONS:  1. Poorly visualized anatomical structures due to suboptimal image quality.  2. Left ventricular systolic function is hyperdynamic with a 70-75% estimated ejection fraction.  3. The patient is in atrial fibrillation which may influence the estimate of left ventricular function and transvalvular flows.  4. Mildly elevated RVSP.  5. Moderately dilated aortic root. QUANTITATIVE DATA SUMMARY: 2D MEASUREMENTS:                          Normal Ranges: Ao Root d:     4.50 cm   (2.0-3.7cm) LAs:           2.50 cm   (2.7-4.0cm) IVSd:          0.80 cm   (0.6-1.1cm) LVPWd:         0.80 cm   (0.6-1.1cm) LVIDd:         4.10 cm   (3.9-5.9cm) LVIDs:         2.20 cm LV Mass Index: 43.9 g/m2 LV % FS        46.3 % AORTA MEASUREMENTS:                    Normal Ranges: Asc Ao, d: 3.70 cm (2.1-3.4cm) LV SYSTOLIC FUNCTION BY 2D PLANIMETRY (MOD):                     Normal Ranges: EF-A4C View: 73.6 % (>=55%) EF-A2C View: 80.4 % EF-Biplane:  78.8 % LV DIASTOLIC FUNCTION:                     Normal Ranges: MV Peak E: 1.09 m/s (0.7-1.2 m/s) MV DT:     245 msec (150-240 msec) MITRAL VALVE:                 Normal Ranges: MV DT: 245 msec (150-240msec) AORTIC VALVE:                          Normal Ranges: AoV Vmax:      1.82 m/s  (<=1.7m/s) AoV Peak P.2 mmHg (<20mmHg) LVOT Max Philipp:  1.42 m/s  (<=1.1m/s) LVOT VTI:      18.80 cm LVOT Diameter: 2.00 cm   (1.8-2.4cm) AoV Area,Vmax: 2.45 cm2  (2.5-4.5cm2)  RIGHT VENTRICLE: TAPSE: 15.8 mm RV s'  0.18 m/s TRICUSPID VALVE/RVSP:                             Normal Ranges: Peak TR Velocity: 2.90 m/s RV Syst Pressure: 36.6 mmHg (< 30mmHg) PULMONIC VALVE:                      Normal Ranges: PV Max Philipp: 0.9 m/s  (0.6-0.9m/s) PV Max PG:  3.2 mmHg  [id number] Delvis Miller MD Electronically signed on 2024 at 5:46:15 PM  ** Final **     XR abdomen 1 view    Result  Date: 5/21/2024  Interpreted By:  Jovany Nuñez, STUDY: XR ABDOMEN 1 VIEW;  5/21/2024 12:58 pm   INDICATION: Signs/Symptoms:post ngt.   COMPARISON: One-view abdomen 05/21/2024 13 a.m.   ACCESSION NUMBER(S): [id number]   ORDERING CLINICIAN: SUN CHAMPAGNE   FINDINGS: One-view abdomen   An enteric tube is positioned within the region of the gastric fundus several cm below the expected gastroesophageal junction. There is a predominantly fluid-filled small bowel and colon pattern. There is less gastric distention with air.   Opacification of the left thorax and multiple air bronchograms are noted.       1.  The enteric tube is positioned within the gastric fundus 2. Advancement of the tube would be recommended for more optimal positioning 3. Predominantly fluid-filled small bowel and colon pattern 4. Opacification of the visualized left thorax and multiple air bronchograms within the left lung   MACRO: None   Signed by: Jovany Nuñez 5/21/2024 2:19 PM Dictation workstation:   [id number]    XR abdomen 1 view    Result Date: 5/21/2024  Interpreted By:  Jovany Nuñez, STUDY: XR ABDOMEN 1 VIEW;  5/21/2024 11:13 am   INDICATION: Signs/Symptoms:ABD DISTENTION.   COMPARISON: None.   ACCESSION NUMBER(S): [id number]   ORDERING CLINICIAN: SUN CHAMPAGNE   FINDINGS: The stomach is distended with air. There is a predominantly fluid-filled small bowel and colon pattern. Gas is demonstrated within the colon and distal ileum. The left ventricular pacemaker electrode appears in adequate position. The left ventricle appears mildly enlarged. Osseous and articular anatomy is normal for age.       1.  The stomach is moderately distended with air otherwise, nonspecific predominantly fluid-filled bowel pattern   MACRO: None   Signed by: Jovany Nuñez 5/21/2024 2:17 PM Dictation workstation:   [id number]    XR chest 1 view    Result Date: 5/21/2024  Interpreted By:  Jovany Nuñez and Calo Sean-Matthew STUDY: XR CHEST 1 VIEW;   5/21/2024 6:14 am   INDICATION: Signs/Symptoms:SOB.   COMPARISON: Chest radiograph 05/16/2024 CT chest 05/18/2024   ACCESSION NUMBER(S): [id number]   ORDERING CLINICIAN: SUN CHAMPAGNE   FINDINGS: AP radiograph of the chest was provided.   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   CARDIOMEDIASTINAL SILHOUETTE: Cardiomediastinal silhouette is stable in size with complete obscuration of the left cardiomediastinal border.   LUNGS: Persistent near-complete opacification of the left hemithorax with interval reduced air bronchograms compared to prior. Persistent patchy right infrahilar airspace opacities are noted. Prominent perihilar and interstitial lung markings are noted on the right. No consolidation, effusion, or pneumothorax on the right.   ABDOMEN: No remarkable upper abdominal findings.   BONES: No acute osseous changes.       1. Virtually complete opacification of the left hemithorax with interval reduced air bronchograms. 2. Persistent patchy right infrahilar airspace opacities which may reflect aspiration changes and/or pneumonia in the appropriate clinical setting.   I personally reviewed the images/study and I agree with the findings as stated by Matthew Lindo MD. This study was interpreted at [facility], [place], [place].   MACRO: None   Signed by: Jovany Nuñez 5/21/2024 11:26 AM Dictation workstation:   [id number]      This patient has a urinary catheter   Reason for the urinary catheter remaining today? urinary retention/bladder outlet obstruction, acute or chronic    Assessment/Plan   Vinicius Arriola is a 88 y.o. year old male patient with Past Medical History of  PAD, afib/flutter, hx DVT on warfarin, HTN, CAD s/p stent, mitral regurg, HLD, polycythemia vera, managed by Dr. Rothman ([facility]), JOVANNI, basal cell ca on face, s/p removal and radiation of malignant mesothelioma, S/p L VATS  with decort 5/2022  c/b post op ileus, XRT 9-10/2022, found to have a recurrence of Mesothelioma on PET scan and underwent first chemotherapy session on 5/15/2024, after which he developed fever, diarrhea, nausea an vomiting. Admitted to [facility] and treated empirically for sepsis presumed due to RLL Pneumonia iso of CT CAP showing significant collapse of the left lung worsened appearence of known Mesothelioma with concern for tumor thrombus, new chest wall invasion, right lower lobe ill-defined patchy infiltrate, as well as mild uncomplicated diverticulitis. Hospital course complicated with hypotension that responded to fluids, and new oxygen requirement. The patient is currently HDS with no indication for ICU.     5/22/24 Updates:   - INR 6.3 supratherapeutic > 2.4 s/p 5mg Vitamin K, continuing to hold warfarin  - 5 mg additional vitamin k  - d dimer 1521, fibrinogen >1000- no c/f dic  - Repeat blood cultures drawn   - Coffee grounds via NGT tube, continuing to monitor output   - +melena  - Consulted GI, started pantoprazole 80mg daily   - Left lower extremity skin changes suspicious for cellulitis, continue to monitor  - Suspected prerenal VIK  - Switching zosyn + vanc to filiberto + micafungin   - order CT chest/abd/pelvis to further characterize source of infection   - 5/22 ANC = 1100 s/p 1 dose filgrastim   - peripheral smear  - CT CAP w/o contrast- pending formal read, on review c/f b/l hydronephrosis and lots of gas in abdomen     #Fever  #Pneumonia, Right lower lobe ill-defined patchy infiltrate  #Sepsis most likely d/t pneumonia  #Lower leg skin changes likely d/t cellulitis   #AHRF, improved  :: multifactorial, volume up on exam so may be component of pulmonary edema w/ pneumonia  :: at OSH met SIRS criteria (febrile, tachypnea)  :: UA 5/18 with small LE, 3+ bacteria   :: Resp Cult salivary contamination  :: MRSA nares: negative  :: Strep/Legionella antigens: negative  :: c diff and enteric stool panel:  negative  :: BC 5/16 NGTD, repeat BC 5/20 NGTD  :: urine cx 5/21 negative  :: CRP 30.46>42.36  :: CXR 5/21 showing persistent patchy right infrahilar airspace opacities    :: s/p Azithro (5/16-5/18)  :: asymmetric warmth, erythema, edema noted in left lower extremity   - holding vanc (5/16-5/18) (5/20-5/22) and zosyn (5/16-5/18) (5/20-22)  - switch to meropenem (5/19-5/20) (5/22-) and micafungin (5/22-)   - repeat blood cultures drawn 5/22, pending    - borders of left lower extremity skin changes noted, will ctm changes   - CT CAP w/o contrast- pending formal read, on review c/f b/l hydronephrosis and lots of gas in abdomen     #coffee grounds via NGT (placed 5/21)  #melena likely d/t GI bleed   #Elevated INR  :: KUB 5/21, showing moderate distention of stomach with nonspecific predominantly fluid-filled bowel pattern.  :: 5/20-5/21 patient emesis that is watery, dark brown, malodorous   :: did initially present w/ diarrhea could have been chemo induced vs stool ball  :: INR 4.9>6.3>2.4  :: s/p Vitamin K 5 mg  - ctm NGT output   - holding ferrous sulfate   - pantoprazole 80mg daily   - Continue to trend Hgb BID (goal > 7), platelets (goal > 50)  - GI consulted, appreciate recs  - 5 mg vitamin K    #VIK on CKD, likely prerenal d/t fluid status   # CKD (baseline Cr 1.3-1.4)  # urine retention s/p Boyd Catheter Placement by Urology in OSH  :: US renal 5/16: Nonobstructing 1 cm left renal calculus, no hydronephrosis  :: Baseline Cr 1.3-1.4  :: Cr 1.64>2.26  - will eventually need boyd removal and trial  - strict in/out  - tamsulosin 0.4 mg daily- holding iso NPO  -minimize nephrotoxic medications as able  - order repeat urinalysis   - CTM     #Pancytopenia, most likely medication induced from pemfexy  #Thrombocytopenia  :: c/f TTP, DIC  ::   :: s/p pemfexy 5/14  :: d dimer 1521, fibrinogen >1000- no c/f dic  :: no schistocytes on smear  ::   -filgrastim 480 mcg daily until  ANC >1000  - 5/22 ANC 1100  "  - haptoglobin    # Worsening mesothelioma  # tumor Thrombus  #Mesothelioma with Loculated Pleural Effusions   #Left Hilar Mass, Left Pulmonary Trunk Mass  #History JOVANNI, Pulmonary Embolism  :: primary oncologist Dr. Galvan  :: s/p L VATS w/ decort 5/2022, XRT 9-10/22  :: s/p \"half-dose\" temfexy on 5/14  :: CT chest 5/18 with significant collapse of left lung, loculated pleural effusions consistent with patient's known mesothelioma.  Also shows ill-defined patchy infiltrate in right lower lobe.  Shows left hilar mass with extension into left pulmonary trunk likely tumor thrombus   - On 3L nasal cannula, wean supplemental O2 for SpO2 goal >90%   - Iholding warfarin, continue to monitor   - Dr. Henderson recommends transitioning to Eliquis 5mg BID, pending INR   - continuing protocol w/ 1 mg folic acid daily      #History Afib, HLD, CAD, PAD  #History DVT/PE on Warfarin  - hb downtrend likely iatrogenic iso of repeated blood draws, fluid resuscitation. Currently no sx of overt bleeding.  - would consider holding AC if significant Hb drop on follow up CBC,  - Holding home metoprolol 25 mg twice daily  - Holding warfarin, aspirin  - Continue home statin- holding iso npo     # Hospital-acquired Delirium  :: waxing and waning altered mental status  - apply delirium precautions  - pulled out NGT- now on 1:1 sitter    Msc: on Trazodone for insomnia- holding iso npo     Antibiotics:: vanc/zosyn  Dispo: pending PT/ OT snf     F: Replete PRN  E: Keep Mg >2, phos >3  and K >4  N: NPO  A: PIV  O2: On NC 4 L  DVT ppx: warfarin  Code Status: DNR/DNI   Contact: Kirsten Arriola (Spouse)  [phone] (Work Phone)        MYLA AUSTIN  Medical Student     I agree with the above assessment and plan,  Ernestine Owen  Internal Medicine, PGY-1    "

## 2024-05-22 NOTE — NURSING NOTE
0115: RN found patient with NGT removed from L nare. Patient reports he does not remember how the NGT was pulled. Upon assessment, RN found patient to be increasingly tachycardic and tachypnic. Team notified of change of VS and NGT removal. New orders placed for NGT as well as restraints. RN discussed with resident of attempting alternative measures prior to initiating non violent restraints. Patient's overall presentation appeared poor and rapid was initiated. NGT output was also noted to be darker. See flowsheet for vital signs.   VBGs, blood cultures, and labs were drawn on patient.   Vitamin K ordered and given to patient.   Patient vitals remained the same throughout night with improvement in fever.   RN made resident aware of change in urine color and possible blood in stool.   Sitter was requested for patient safety and observation.    Valerie Espinosa RN

## 2024-05-22 NOTE — PROGRESS NOTES
SW spoke with the patient and his wife at bedside to discuss planning for the patient's care at discharge. SW shared that the medical team will determine when the patient will be medically ready for discharge but the TCC Team will assist with planning for the patient's care and making necessary referrals. SW shared that PT/OT recommended SNF and SW provided the pt.'s wife with SNF list. Pt.'s wife shared the patient has been at [facility] in the past and this would be the facility where they would want the patient to go for rehab. SW shared that we would be able to get a PM&R referral to determine what LOC the physician would recommend and pt.'s wife in agreement with this plan. Will continue to follow for support.  MICHELLE Jeffers   5/22/24@12:10pm

## 2024-05-22 NOTE — SIGNIFICANT EVENT
Rapid Response - Tachycardia/Tachypnea    Pt in good spirits but continues to have elevated RR which continues from OSH. Continues to be treated for pneumonia with vanc and zosyn. VBG drawn and ran.     SpO2 95% on 4L  Bilateral BS clear    Pt and RN encouraged to call if needed

## 2024-05-22 NOTE — SIGNIFICANT EVENT
RAPID RESPONSE RN NOTE:       05/22/24 1657   Onset Documentation   Rapid Response Initiated By Radar auto page   Location/Room [facility]  ([place])   Pager Time 1657   Arrival Time 1710   Event End Time 1715   Level II Called No   Primary Reason for Call Radar auto page  (RADAR score  = 10)     RADAR auto page received - Temp 37.2, , RR 40, BP 86/54, pulse ox 96%.  Primary team MD Owen and Ned notified.  Per bedside RN, no acute changes but pt remains tachypneic (see prior note). 500 ml bolus ordered and bedside RN aware.  Repeat labs ordered.  No further rapid response interventions requested by primary team at this time.

## 2024-05-22 NOTE — CONSULTS
Gastroenterology Consult Service INITIAL CONSULT Note  Department of Gastroenterology & Hepatology  [facility]    [facility]  May 22, 2024   Patient: Vinicius Arriola    Medical Record: [id number]    Reason for Consult: coffee grounds per NG, melena  Requesting Service: Destiny/ oncology     Subjective   Vinicius Arriola is a 88 y.o. 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 Gastroenterology is consulted for concern for GIB.     Mr. Arriola was admitted to [facility] on 5/16 with ansuea and vomtiing after first dose of pemtrexed the day prior. On presentation, was hypotensige and tachycardic. He was given vanc and zosyn. BCXs NGTD. Labs at the time notable for left shift, VIK. CT with evidence of subtle inflammaotry changes of sigmoid colon, concerning for low grade diverticulitis, also with complete collapse and consolidation of L lung with loculated effusion at hte base, also with concenr for tumor thrombus (L hilar fullness --> pulmonary trunk). Also with invasion of the tumor in posterior chest wall. He continued to be febrile and thus was transferred to [facility] on 5/19.     Since arrial here, has been having ongoing diarrhea, nasea and vomtiing. Also with ongoing fevers. Given several episodes of large volume emesis, pt had NG placed yesterday evening. Since placement yesterday has been draining brown liquid. This AM, evidence of black stool w/ red streaks per rectal tube. Labs with stable Hb over last 24 hours (9.9 --> 10.3 --> 9.3), plts downtrending (normal 5 days prior--> 107 --> 73 --> 57). Also with neutropenia (ANC yesterday 0.33), s/p filgastram yesterday. INR trend: 3.4 --> 4.8 --> 6.3 last night --> IV Vit K 5 mg --> pending this AM. Last dose of  coumain 5/19. Worsening renal function. Bili wnls.     This AM, pt seen at bedside. He is febrile, tachycardic and tachypnic. No abd pain. On 4L NC.  NG tube was flushed at bedside with evidence of coffee grounds no blood clots appreciated, no blood appreciated. Denies any NSAID use.     12 point ROS otherwise negative, unless indicated above.     Past Medical History:    Past Medical History:   Diagnosis Date    Acute embolism and thrombosis of unspecified deep veins of unspecified lower extremity (Multi)     Acute embolism and thrombosis of unspecified deep veins of unspecified lower extremity    Dental disease     Upper and lower dentures    Encounter for preprocedural cardiovascular examination 11/02/2021    Pre-operative cardiovascular examination    Obesity, unspecified 07/15/2022    Class 2 obesity with body mass index (BMI) of 38.0 to 38.9 in adult    Personal history of diseases of the blood and blood-forming organs and certain disorders involving the immune mechanism     History of polycythemia    Personal history of other diseases of male genital organs     History of benign prostatic hyperplasia    Personal history of other diseases of the circulatory system     History of hypertension    Personal history of other diseases of the circulatory system     History of cardiac arrhythmia    Personal history of other diseases of the circulatory system 10/21/2021    History of abnormal electrocardiography    Personal history of other diseases of the circulatory system     History of essential hypertension    Personal history of other diseases of the nervous system and sense organs     History of sleep apnea    Personal history of other malignant neoplasm of skin     History of malignant neoplasm of skin    Personal history of other specified conditions     History of balance disorder    Personal history of other venous thrombosis and embolism     History of deep venous thrombosis       Home  Medications  Medications Prior to Admission   Medication Sig Dispense Refill Last Dose    ascorbic acid (Vitamin C) 1,000 mg tablet Take 1 tablet (1,000 mg) by mouth once daily.       aspirin 81 mg EC tablet Take 1 tablet (81 mg) by mouth once daily at bedtime.       carboxymethylcellulose (Refresh Celluvisc) 1 % ophthalmic solution dropperette Administer 1 drop into affected eye(s) once daily in the morning.       ferrous sulfate 325 (65 Fe) MG EC tablet Take 65 mg by mouth 2 times a day with meals. Do not crush, chew, or split.       ferrous sulfate, 325 mg ferrous sulfate, tablet TAKE 1 TABLET BY MOUTH TWICE A DAY WITH MEALS 180 tablet 3     furosemide (Lasix) 20 mg tablet Take 1 tablet (20 mg) by mouth once daily.       melatonin 5 mg tablet Take 1 tablet (5 mg) by mouth as needed at bedtime.       metoprolol tartrate (Lopressor) 25 mg tablet Take 1 tablet (25 mg) by mouth 2 times a day.       nitroglycerin (Nitrostat) 0.4 mg SL tablet Place under the tongue every 5 minutes if needed for chest pain (up to 3 doses).       omega-3 fatty acids-fish oil 360-1,200 mg capsule Take 1 capsule (1,200 mg) by mouth twice a day.       potassium chloride CR (Klor-Con) 10 mEq ER tablet Take 1 tablet (10 mEq) by mouth once daily. 90 tablet 3     promethazine-DM (Phenergan-DM) 6.25-15 mg/5 mL syrup Take 1.25 mL by mouth 4 times a day as needed for cough.       rosuvastatin (Crestor) 10 mg tablet Take 1 tablet (10 mg) by mouth once daily.       traZODone (Desyrel) 50 mg tablet Take 2 tablets (100 mg) by mouth once daily at bedtime.       warfarin (Coumadin) 1 mg tablet Take 1-2 tablets daily or as directed per [facility] 90 tablet 1     warfarin (Coumadin) 2 mg tablet Take 1 tablet (2 mg) by mouth see administration instructions. Take 1 tablet daily or as directed per [facility] 90 tablet 0     warfarin (Coumadin) 4 mg tablet TAKE AS DIRECTED Per [facility] 90 tablet 0     ZINC ORAL Take 1  tablet by mouth once daily.          Surgical History:    Past Surgical History:   Procedure Laterality Date    OTHER SURGICAL HISTORY  2019    Hernia repair    OTHER SURGICAL HISTORY  2019    Tonsillectomy with adenoidectomy    OTHER SURGICAL HISTORY  2019    Cataract surgery    OTHER SURGICAL HISTORY  06/10/2022    Pulmonary decortication    OTHER SURGICAL HISTORY  2021    Knee replacement    OTHER SURGICAL HISTORY  2020    Cardioversion    OTHER SURGICAL HISTORY  2020    Finger surgical procedure    OTHER SURGICAL HISTORY  2022    Cardiac catheterization with stent placement    OTHER SURGICAL HISTORY  2022    Pulmonary decortication    OTHER SURGICAL HISTORY  10/21/2021    Back surgery    OTHER SURGICAL HISTORY  2021    Colonoscopy    OTHER SURGICAL HISTORY  2021    Inguinal hernia repair    OTHER SURGICAL HISTORY  2021    Trigger finger repair    OTHER SURGICAL HISTORY  2021    Umbilical hernia repair    OTHER SURGICAL HISTORY  2021    Carpal tunnel surgery       Allergies:    Allergies   Allergen Reactions    Benadryl Allergy Decongestant Anxiety and Insomnia    Diphenhydramine Anxiety     anxious    Diphenhydramine Hcl Anxiety and Insomnia       Social History:    Social History     Socioeconomic History    Marital status:      Spouse name: Not on file    Number of children: Not on file    Years of education: Not on file    Highest education level: Not on file   Occupational History    Not on file   Tobacco Use    Smoking status: Former     Current packs/day: 0.00     Average packs/day: 1 pack/day for 10.0 years (10.0 ttl pk-yrs)     Types: Cigarettes     Start date:      Quit date:      Years since quittin.4    Smokeless tobacco: Never   Vaping Use    Vaping status: Never Used   Substance and Sexual Activity    Alcohol use: Yes     Alcohol/week: 4.0 standard drinks of alcohol     Types: 4 Cans of beer per week      Comment: 4 beers weekly    Drug use: Never    Sexual activity: Defer   Other Topics Concern    Not on file   Social History Narrative    Not on file     Social Determinants of Health     Financial Resource Strain: Patient Declined (5/19/2024)    Overall Financial Resource Strain (CARDIA)     Difficulty of Paying Living Expenses: Patient declined   Food Insecurity: No Food Insecurity (2/16/2024)    [facility]    Hunger Vital Sign     Worried About Running Out of Food in the Last Year: Never true     Ran Out of Food in the Last Year: Never true   Transportation Needs: Patient Declined (5/19/2024)    PRAPARE - Transportation     Lack of Transportation (Medical): Patient declined     Lack of Transportation (Non-Medical): Patient declined   Physical Activity: Inactive (5/19/2024)    Exercise Vital Sign     Days of Exercise per Week: 0 days     Minutes of Exercise per Session: 0 min   Stress: No Stress Concern Present (12/11/2023)    [employer] [place] [employer] - Occupational Stress Questionnaire     Feeling of Stress : Not at all   Social Connections: Feeling Socially Integrated (1/4/2024)    OASIS : Social Isolation     Frequency of experiencing loneliness or isolation: Never   Intimate Partner Violence: Not At Risk (12/11/2023)    Humiliation, Afraid, Rape, and Kick questionnaire     Fear of Current or Ex-Partner: No     Emotionally Abused: No     Physically Abused: No     Sexually Abused: No   Housing Stability: Patient Declined (5/19/2024)    Housing Stability Vital Sign     Unable to Pay for Housing in the Last Year: Patient declined     Number of Places Lived in the Last Year: 1     Unstable Housing in the Last Year: Patient declined       Family History:    Family History   Problem Relation Name Age of Onset    Accidental death Mother      Coronary artery disease Mother      Other (Cardiac disorder) Father      Dementia Father      Cancer Father      Colon cancer Daughter        No family history of GI or liver disease.     Objective     Vitals:    Vitals:    05/22/24 0140 05/22/24 0300 05/22/24 0501 05/22/24 0600   BP:  112/65  110/70   BP Location:  Right arm  Right arm   Patient Position:  Lying  Lying   Pulse:  (!) 118  (!) 113   Resp:  (!) 32  (!) 38   Temp:  37.5 °C (99.5 °F)  37 °C (98.6 °F)   TempSrc:  Temporal  Temporal   SpO2: 95% 93% 95% 94%   Weight:       Height:         Failed to redirect to the Timeline version of the [employer] SmartLink.    Intake/Output Summary (Last 24 hours) at 5/22/2024 0951  Last data filed at 5/22/2024 0400  Gross per 24 hour   Intake 325 ml   Output 1850 ml   Net -1525 ml       Physical Exam  Gen: chronically ill appearing, A+Ox3, uncomfortable appearing  HEENT: PEERL, EOMI, dry MM; NG tube flushed with coffee grounds that easily cleared, there were no blood clots appreciated or active bleeding  Resp: tachypnic, on 4L NC  CV: +tachy   GI: non-tender, non-distended, normal Bss  MSK: warm and well perfused  Neuro: A+Ox3, no focal deficits  Skin: warm and dry, no lesions, no rashes     Rectal: rectal tube in place with dark stool with scant blood streaks  Labs:   Lab Results   Component Value Date    WBC 1.6 (L) 05/22/2024    HGB 9.4 (L) 05/22/2024    HCT 28.7 (L) 05/22/2024    MCV 88 05/22/2024    PLT 55 (L) 05/22/2024     Lab Results   Component Value Date    GLUCOSE 104 (H) 05/21/2024    CALCIUM 8.8 05/21/2024     05/21/2024    K 3.7 05/21/2024    CO2 27 05/21/2024    CL 99 05/21/2024    BUN 30 (H) 05/21/2024    CREATININE 1.64 (H) 05/21/2024     Lab Results   Component Value Date    ALT 73 (H) 05/21/2024     (H) 05/21/2024    ALKPHOS 64 05/21/2024    BILITOT 0.6 05/21/2024     Lab Results   Component Value Date    INR 6.3 (HH) 05/22/2024    INR 6.3 (HH) 05/22/2024    INR 4.8 (H) 05/21/2024    PROTIME 72.2 (HH) 05/22/2024    PROTIME 72.2 (HH) 05/22/2024    PROTIME 54.9 (H) 05/21/2024       Imaging:     AXR 5/21:  IMPRESSION:  1.  The  enteric tube is positioned within the gastric fundus  2. Advancement of the tube would be recommended for more optimal  positioning  3. Predominantly fluid-filled small bowel and colon pattern  4. Opacification of the visualized left thorax and multiple air  bronchograms within the left lung    AXR 5/21  IMPRESSION:  1.  The stomach is moderately distended with air otherwise,  nonspecific predominantly fluid-filled bowel pattern    CT Chest/ abd/pelvis 5/18:  IMPRESSION:  CHEST:  1. Significant collapse of the left lung with heterogenous  appearance, pleural thickening and small loculated pleural fluid  measuring 7.8 cm consistent with the patient's known mesothelioma  which have worsened from the prior CT scan dated 06/14/2023 and  grossly unchanged from 12/10/2023 unenhanced CT scan allowing for  differences in techniques. Focus of new left posterior chest wall  invasion noted at the level of 9th 10th and 10th 11th rib spaces.  2. Right lower lobe ill-defined patchy infiltrate measuring 2.3 x 1.4  cm. Trace right-sided pleural effusion with surrounding atelectasis.  3. Redemonstrated left hilar ill-defined enhancing fullness appears  extending to the left pulmonary trunk likely tumor thrombus and felt  less likely bland thrombus which has significantly worsened from  06/14/2023 CT scan.  4. Mildly enlarged multiple mediastinal lymph nodes in the largest in  the subcarinal region measuring 1.6 cm, grossly unchanged from prior.      ABDOMEN-PELVIS:  1. Proximal sigmoid diverticulosis with mild wall thickening could be  related to episodes of underlying mild uncomplicated diverticulitis  2. Other ancillary findings are unchanged.    CT Abd/pelvis 5/17:  IMPRESSION:  1.  Subtle inflammatory change along the proximal sigmoid colon which  may represent a low-grade acute diverticulitis.  No definite  perforation or abscess.  2.  Complete collapse and consolidation of the left lung with a  loculated effusion at the left  lung base, unchanged compared to the  prior chest CT and likely consistent with patient's reported  mesothelioma, possibly chronic post treatment change.  3.  Cardiomegaly with chronic changes suggesting COPD.  4.  Cholelithiasis.  5.  Moderate bilateral renal atrophy with nonobstructing 6 mm left  renal calculus.  6.  Additional chronic changes as described.    GI procedures:  No prior EGD  Reports last colonoscopy ~ 5 years ago    Assessment & Plan   Vinicius Arriola is a 88 y.o. 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 Gastroenterology is consulted for concern for GIB.     Patient has evidence of coffee grounds concerning for upper GIB. Patient does have, however, worsening coagulopathy and thrombocytopenia. Pending DIC and hemolysis workup. Likely in the setting of ongoing fevers/ sepsis. Ddx: mucosal oozing in the setting of coagulopathy, vascular lesion, gastritis/ esophagitis/duodenitis, PUD, etc.. Reassuringly, without gross Hb drop or hemodynamic instability. Additionally, coffee grounds without any active bleeding via NGT. No role for endoscopic assessment in the setting of coagulopathy and neutropenia.     #coffee grounds via NGT  #melena  - correct coagulopathy, can send TEG and replete factors as able   - Continue to trend Hgb BID (goal > 7), platelets (goal > 50)  - Ensure adequate IV access, ideally 2 large bore Ivs  - Would generally avoid administration of blood products to correct coagulopathy unless active hemorrhage  - would hold PO iron for now, can given IV iron while inpatient if needed   - sepsis treatment/ abx as per primary   - continue IV PPI BID  - no indication for octreotide     Patient seen and discussed with attending, Dr. Ahn.     Ale Archibald, GI fellow    Thank you for  the consultation. Gastroenterology will continue to the follow the patient.   Please do not hesitate to contact me on [employer]iku or page [phone] if there are any further questions between the weekday hours of 7 AM - 5 PM.   If there is an urgent concern during the weekend, after-hours, or holidays; then please page the on-call GI fellow at [phone]. Thank you.    SIGNATURE: Ale Archibald MD PATIENT NAME: Vinicius Arriola   DATE: May 22, 2024 MRN: [id number]

## 2024-05-22 NOTE — SIGNIFICANT EVENT
Rapid Response paged at 0130 hours as a result of the patient developing tachypnea and tachycardia  (May refer also to Flowsheets section of EMR for specifics)  Rapid, shallow respirations evident.  Patient denying any sort of difficulty related to same.  Dr. Moiz Bolaños resident covering [facility] service came to the room to assess.  SCC 4 nursing staff veer newly ordered lab work including a venous blood gas.  VBG results returned as: pH 50, pCO2 33, pO2 44, HCO3 25.7, SaO2 80%  NG tube that had earlier been removed, was replaced by bedside RN

## 2024-05-22 NOTE — CONSULTS
"Nutrition Initial Assessment:   Nutrition Assessment    The patient is a 88 y.o. male who is hospital day #3.  Pt presented to OSH w/ abdominal pain, fever, and diarrhea post first cycle of chemo. Treated empirically for sepsis iso collapse of L lung. Concern for tumor thrombus - transferred to [facility] [place]. Pt had two episodes of brom malodorous emesis - NG placed for decompression. GI consulted for UGIB.     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     Reason for Assessment: Admission nursing screening  Malnutrition Screening Tool (MST)  Have you recently lost weight without trying?: Yes  If yes, how much weight have you lost?: Unsure  Weight Loss Score: 2  Have you been eating poorly because of a decreased appetite?: Yes  Malnutrition Score: 3      Nutrition History:  Food and Nutrient History: Pt and family present and both able to provide information. Pt reports he tries to do 3 meals a day - does a good breakfast and a light lunch and dinner. Portion sizes are smaller than baseline. Appetite has been decreased for the past year. Drinks Ensure and one activia yoghurt at night every night. N/V started one week ago after chemo. Would occur usually after eating. Large bore NG in place at time of visit - set to suction; high output noted. BM are okay, no issues with chewing or swallowing, no food allergies.  Vitamin/Herbal Supplement Use: takes daily iron supplement x2, vitamin C, zinc, vitamin D3, and fish oil.      Anthropometrics:  Start of admission anthropometrics:  Height: 172.7 cm (5' 7.99\")  Weight: 110 kg (242 lb 8.1 oz)  BMI (Calculated): 36.88    IBW/kg (Dietitian Calculated): 70 kg  Percent of IBW: 157 %       Wt Hx   05/19/24 110 kg (242 lb 8.1 oz) - bed scale   05/19/24 113 kg (249 lb 9 oz) - bed scale    05/17/24 110.4 kg (243 lb 6 oz)   02/16/24 114 kg (250 lb 6.4 oz)   12/11/23 115 kg " (254 lb 3.1 oz)   12/06/23 116 kg (255 lb)   11/02/23 116 kg (255 lb 4.7 oz)   08/18/23 115 kg (254 lb 6 oz)   05/05/23 115 kg (254 lb 5 oz)   04/20/23 111 kg (244 lb)   03/13/23 112.3 kg (247 lb)     Weight Change %:  Weight History / % Weight Change: Per EMR wt hx, pt's wt appears to be stable in the past year with possible non significnat wt loss of 3% in 3 months. Family endorses wt loss though no estimated #lb lost or time frame provided. Believe pt was 277lb some years ago and is now 245lb.  Significant Weight Loss: No    Nutrition Focused Physical Exam Findings:  defer: Hard to do NFPE today - will likely need repeat at f/u  Subcutaneous Fat Loss:   Buccal Fat Pads: Mild-Moderate (flat cheeks, minimal bounce)  Muscle Wasting:  Temporalis: Mild-Moderate (slight depression)  Pectoralis (Clavicular Region): Well nourished (clavicle not visible)  Deltoid/Trapezius: Well nourished (rounded appearance at arm, shoulder, neck)  Interosseous: Defer  Quadriceps: Mild-Moderate (mild depression on inner and outer thigh)  Gastrocnemius: Defer  Edema:  Edema: none  Physical Findings:  Skin: Negative    Objective Data:    Last BM Date: 05/21/24    Nutrition Significant Labs:    Results from last 7 days   Lab Units 05/22/24  0734 05/22/24  0151 05/21/24  0936   HEMOGLOBIN g/dL 9.4* 9.3* 10.3*   MCV fL 88 87 90   GLUCOSE mg/dL 114*  --  104*   POTASSIUM mmol/L 3.7  --  3.7   SODIUM mmol/L 141  --  138   PHOSPHORUS mg/dL 2.7  --  3.3   MAGNESIUM mg/dL 2.27  --  2.21   CREATININE mg/dL 2.26*  --  1.64*   BUN mg/dL 42*  --  30*   ALT U/L 51  --  73*   AST U/L 96*  --  167*   ALK PHOS U/L 56  --  64       Nutrition Specific Medications:  [Held by provider] ascorbic acid, 1,000 mg, oral, Daily  [Held by provider] ergocalciferol, 1,250 mcg, oral, Every Sunday  [Held by provider] ferrous sulfate (325 mg ferrous sulfate), 65 mg of iron, oral, BID  pantoprazole, 80 mg, intravenous, Daily  piperacillin-tazobactam, 4.5 g, intravenous,  q6h  [Held by provider] rosuvastatin, 10 mg, oral, Nightly  vancomycin, 1,250 mg, intravenous, q24h    I/O:   I/O last 2 completed shifts:  In: 325 (3 mL/kg) [IV Piggyback:325]  Out: 1850 (16.8 mL/kg) [Urine:775 (0.3 mL/kg/hr); Emesis/NG output:1075]  Weight: 110 kg     Dietary Orders (From admission, onward)       Start     Ordered    05/21/24 1142  NPO Diet; Effective now  Diet effective now         05/21/24 1141                     Estimated Needs:   Total Energy Estimated Needs (kCal): 1900 kCal  Method for Estimating Needs: 27 kcal/kg IBW    Total Protein Estimated Needs (g): 90 g  Method for Estimating Needs: 1.3 g/kg IBW    Method for Estimating Needs: 1 ml/kcal or per team          Nutrition Diagnosis        Nutrition Diagnosis  Patient has Nutrition Diagnosis: Yes  Diagnosis Status (1): New  Nutrition Diagnosis 1: Increased nutrient needs  Related to (1): increased metabolic demand  As Evidenced by (1): mesothelioma dx + chemo       Nutrition Interventions/Recommendations   Individualized Nutrition Prescription Provided for : 1900 kcal and 90g protein    1) If expected that pt is going to stay NPO or on CLD for >5 days then should consider initiating nutrition support to prevent decline.   Parenteral nutrition would be appropriate if pt continues with NG to suction. Pt has peripheral access in place. Place consult if recs are needed.     2) If diet is advanced please order Ensure Plus @dinner.     Nutrition Education:   N/A - nutrition plan unclear at the moment.     Nutrition Monitoring and Evaluation        Monitoring and Evaluation Plan: Weight  Weight: Weight change  Criteria: no wt change    Monitoring and Evaluation Plan: Electrolyte/renal panel  Electrolyte and Renal Panel: Phosphorus, Magnesium, Potassium, Sodium  Criteria: lytes wnl              Time Spent (min): 45 minutes

## 2024-05-22 NOTE — SIGNIFICANT EVENT
"Patient pulled NG tube order placed and ordered a sitter.    Had low grade fever labs were sent for VBG, and blood culture. Currently on Vanc/Zosyn    Blood pressure 114/66, pulse (!) 122, temperature 37.5 °C (99.5 °F), temperature source Temporal, resp. rate (!) 28, height 1.727 m (5' 7.99\"), weight 110 kg (242 lb 8.1 oz), SpO2 94%.    "

## 2024-05-22 NOTE — SIGNIFICANT EVENT
RAPID RESPONSE RN NOTE:       05/22/24 1007   Onset Documentation   Rapid Response Initiated By Radar auto page   Location/Room [facility]  ([facility] [place])   Pager Time 1007   Arrival Time 1020   Event End Time 1100   Level II Called No   Primary Reason for Call Radar auto page  (RADAR score = 10)     RADAR auto page received - temp 38.4, , RR 44, BP 96/60, pulse ox 97% on 3LNC.    On my arrival, pt is awake and alert, oriented x 3 at the time. He denies shortness of breath but remains tachypneic mid 40's with use of accessory muscles. Pulse ox >95% on 3LNC. Respiratory therapist and team to bedside for eval. Pt given nebulizer and placed on AIRVO by RT to assist with work of breathing.  Pt placed on continuous pulse ox.   Fever and tachycardia persists. Blood cultures completed overnight. CXR completed. Antibiotic coverage broadened to include Meropenem. CT chest/abdomen ordered.  Ongoing goals of care continue with family at bedside. Pt currently DNR/DNI.    Bedside RN and team encouraged to page rapid response for any clinical deterioration or assistance needed.

## 2024-05-23 ENCOUNTER — DOCUMENTATION (OUTPATIENT)
Dept: INTERNAL MEDICINE | Facility: HOSPITAL | Age: 89
End: 2024-05-23
Payer: MEDICARE

## 2024-05-23 PROBLEM — D64.9 ANEMIA: Status: ACTIVE | Noted: 2024-05-23

## 2024-05-23 LAB
ALBUMIN SERPL BCP-MCNC: 2.1 G/DL (ref 3.4–5)
ALBUMIN SERPL BCP-MCNC: 2.1 G/DL (ref 3.4–5)
ALP SERPL-CCNC: 50 U/L (ref 33–136)
ALP SERPL-CCNC: 51 U/L (ref 33–136)
ALT SERPL W P-5'-P-CCNC: 29 U/L (ref 10–52)
ALT SERPL W P-5'-P-CCNC: 34 U/L (ref 10–52)
ANION GAP BLDA CALCULATED.4IONS-SCNC: 12 MMO/L (ref 10–25)
ANION GAP BLDV CALCULATED.4IONS-SCNC: 10 MMOL/L (ref 10–25)
ANION GAP BLDV CALCULATED.4IONS-SCNC: ABNORMAL MMOL/L
ANION GAP SERPL CALC-SCNC: 15 MMOL/L (ref 10–20)
ANION GAP SERPL CALC-SCNC: 20 MMOL/L (ref 10–20)
APTT PPP: 25 SECONDS (ref 27–38)
APTT PPP: 27 SECONDS (ref 27–38)
AST SERPL W P-5'-P-CCNC: 48 U/L (ref 9–39)
AST SERPL W P-5'-P-CCNC: 58 U/L (ref 9–39)
BASE EXCESS BLDA CALC-SCNC: 1.8 MMOL/L (ref -2–3)
BASE EXCESS BLDV CALC-SCNC: 2.4 MMOL/L (ref -2–3)
BASE EXCESS BLDV CALC-SCNC: 2.7 MMOL/L (ref -2–3)
BASOPHILS # BLD MANUAL: 0 X10*3/UL (ref 0–0.1)
BASOPHILS NFR BLD MANUAL: 0 %
BILIRUB SERPL-MCNC: 0.7 MG/DL (ref 0–1.2)
BILIRUB SERPL-MCNC: 0.8 MG/DL (ref 0–1.2)
BLOOD EXPIRATION DATE: NORMAL
BODY TEMPERATURE: 37 DEGREES CELSIUS
BUN SERPL-MCNC: 58 MG/DL (ref 6–23)
BUN SERPL-MCNC: 60 MG/DL (ref 6–23)
CA-I BLDA-SCNC: 1.18 MMOL/L (ref 1.1–1.33)
CA-I BLDV-SCNC: 1.17 MMOL/L (ref 1.1–1.33)
CA-I BLDV-SCNC: 1.19 MMOL/L (ref 1.1–1.33)
CALCIUM SERPL-MCNC: 7.7 MG/DL (ref 8.6–10.6)
CALCIUM SERPL-MCNC: 7.8 MG/DL (ref 8.6–10.6)
CFT FORM KAOLIN IND BLD RES TEG: 0.8 MIN (ref 0.8–2.1)
CHLORIDE BLDA-SCNC: 106 MMOL/L (ref 98–107)
CHLORIDE BLDV-SCNC: 105 MMOL/L (ref 98–107)
CHLORIDE BLDV-SCNC: ABNORMAL MMOL/L
CHLORIDE SERPL-SCNC: 104 MMOL/L (ref 98–107)
CHLORIDE SERPL-SCNC: 105 MMOL/L (ref 98–107)
CLOT ANGLE.KAOLIN INDUCED BLD RES TEG: 79 DEG (ref 63–78)
CLOT INIT KAO IND P HEP NEUT BLD RES TEG: 5.6 MIN (ref 4.3–8.3)
CLOT INIT KAO IND P HEP NEUT BLD RES TEG: 6.7 MIN (ref 4.6–9.1)
CO2 SERPL-SCNC: 24 MMOL/L (ref 21–32)
CO2 SERPL-SCNC: 28 MMOL/L (ref 21–32)
CREAT SERPL-MCNC: 3 MG/DL (ref 0.5–1.3)
CREAT SERPL-MCNC: 3.15 MG/DL (ref 0.5–1.3)
DISPENSE STATUS: NORMAL
EGFRCR SERPLBLD CKD-EPI 2021: 18 ML/MIN/1.73M*2
EGFRCR SERPLBLD CKD-EPI 2021: 20 ML/MIN/1.73M*2
EOSINOPHIL # BLD MANUAL: 0.05 X10*3/UL (ref 0–0.4)
EOSINOPHIL # BLD MANUAL: 0.22 X10*3/UL (ref 0–0.4)
EOSINOPHIL # BLD MANUAL: 0.36 X10*3/UL (ref 0–0.4)
EOSINOPHIL NFR BLD MANUAL: 1 %
EOSINOPHIL NFR BLD MANUAL: 2.4 %
EOSINOPHIL NFR BLD MANUAL: 3.4 %
ERYTHROCYTE [DISTWIDTH] IN BLOOD BY AUTOMATED COUNT: 16.5 % (ref 11.5–14.5)
FIBRINOGEN BLD CALC-MCNC: ABNORMAL MG/DL
GLUCOSE BLD MANUAL STRIP-MCNC: 88 MG/DL (ref 74–99)
GLUCOSE BLD MANUAL STRIP-MCNC: 93 MG/DL (ref 74–99)
GLUCOSE BLDA-MCNC: 109 MG/DL (ref 74–99)
GLUCOSE BLDV-MCNC: 102 MG/DL (ref 74–99)
GLUCOSE BLDV-MCNC: 107 MG/DL (ref 74–99)
GLUCOSE SERPL-MCNC: 106 MG/DL (ref 74–99)
GLUCOSE SERPL-MCNC: 99 MG/DL (ref 74–99)
HAPTOGLOB SERPL-MCNC: 400 MG/DL (ref 37–246)
HCO3 BLDA-SCNC: 25.6 MMOL/L (ref 22–26)
HCO3 BLDV-SCNC: 26.2 MMOL/L (ref 22–26)
HCO3 BLDV-SCNC: 27.9 MMOL/L (ref 22–26)
HCT VFR BLD AUTO: 20.5 % (ref 41–52)
HCT VFR BLD AUTO: 21.5 % (ref 41–52)
HCT VFR BLD AUTO: 22.1 % (ref 41–52)
HCT VFR BLD EST: 22 % (ref 41–52)
HCT VFR BLD EST: 26 % (ref 41–52)
HCT VFR BLD EST: 28 % (ref 41–52)
HGB BLD-MCNC: 6.9 G/DL (ref 13.5–17.5)
HGB BLD-MCNC: 6.9 G/DL (ref 13.5–17.5)
HGB BLD-MCNC: 7.3 G/DL (ref 13.5–17.5)
HGB BLDA-MCNC: 8.8 G/DL (ref 13.5–17.5)
HGB BLDV-MCNC: 7.3 G/DL (ref 13.5–17.5)
HGB BLDV-MCNC: 9.3 G/DL (ref 13.5–17.5)
IMM GRANULOCYTES # BLD AUTO: 0.26 X10*3/UL (ref 0–0.5)
IMM GRANULOCYTES # BLD AUTO: 0.55 X10*3/UL (ref 0–0.5)
IMM GRANULOCYTES # BLD AUTO: 0.67 X10*3/UL (ref 0–0.5)
IMM GRANULOCYTES NFR BLD AUTO: 5.1 % (ref 0–0.9)
IMM GRANULOCYTES NFR BLD AUTO: 6 % (ref 0–0.9)
IMM GRANULOCYTES NFR BLD AUTO: 6.3 % (ref 0–0.9)
INHALED O2 CONCENTRATION: 21 %
INHALED O2 CONCENTRATION: 40 %
INHALED O2 CONCENTRATION: 50 %
INR PPP: 1.2 (ref 0.9–1.1)
INR PPP: 1.2 (ref 0.9–1.1)
LACTATE BLDA-SCNC: 2.1 MMOL/L (ref 0.4–2)
LACTATE BLDV-SCNC: 1.6 MMOL/L (ref 0.4–2)
LACTATE BLDV-SCNC: 2.1 MMOL/L (ref 0.4–2)
LACTATE SERPL-SCNC: 1.6 MMOL/L (ref 0.4–2)
LACTATE SERPL-SCNC: 1.7 MMOL/L (ref 0.4–2)
LYMPHOCYTES # BLD MANUAL: 0.3 X10*3/UL (ref 0.8–3)
LYMPHOCYTES # BLD MANUAL: 0.46 X10*3/UL (ref 0.8–3)
LYMPHOCYTES # BLD MANUAL: 0.51 X10*3/UL (ref 0.8–3)
LYMPHOCYTES NFR BLD MANUAL: 10 %
LYMPHOCYTES NFR BLD MANUAL: 3.3 %
LYMPHOCYTES NFR BLD MANUAL: 4.3 %
MA KAOLIN BLD RES TEG: 67 MM (ref 52–69)
MA KAOLIN+TF BLD RES TEG: 67 MM (ref 52–70)
MA TF IND+IIB-IIIA INH BLD RES TEG: >52 MM (ref 15–32)
MAGNESIUM SERPL-MCNC: 2.36 MG/DL (ref 1.6–2.4)
MCH RBC QN AUTO: 27.6 PG (ref 26–34)
MCH RBC QN AUTO: 28.2 PG (ref 26–34)
MCH RBC QN AUTO: 28.4 PG (ref 26–34)
MCHC RBC AUTO-ENTMCNC: 31.2 G/DL (ref 32–36)
MCHC RBC AUTO-ENTMCNC: 33.7 G/DL (ref 32–36)
MCHC RBC AUTO-ENTMCNC: 34 G/DL (ref 32–36)
MCV RBC AUTO: 84 FL (ref 80–100)
MCV RBC AUTO: 84 FL (ref 80–100)
MCV RBC AUTO: 88 FL (ref 80–100)
METAMYELOCYTES # BLD MANUAL: 0.1 X10*3/UL
METAMYELOCYTES # BLD MANUAL: 0.1 X10*3/UL
METAMYELOCYTES NFR BLD MANUAL: 0.9 %
METAMYELOCYTES NFR BLD MANUAL: 2 %
MONOCYTES # BLD MANUAL: 0.2 X10*3/UL (ref 0.05–0.8)
MONOCYTES # BLD MANUAL: 0.22 X10*3/UL (ref 0.05–0.8)
MONOCYTES # BLD MANUAL: 0.54 X10*3/UL (ref 0.05–0.8)
MONOCYTES NFR BLD MANUAL: 2.4 %
MONOCYTES NFR BLD MANUAL: 4 %
MONOCYTES NFR BLD MANUAL: 5.1 %
MYELOCYTES # BLD MANUAL: 0.1 X10*3/UL
MYELOCYTES # BLD MANUAL: 0.1 X10*3/UL
MYELOCYTES NFR BLD MANUAL: 0.9 %
MYELOCYTES NFR BLD MANUAL: 2 %
NEUTROPHILS # BLD MANUAL: 4.13 X10*3/UL (ref 1.6–5.5)
NEUTROPHILS # BLD MANUAL: 8.97 X10*3/UL (ref 1.6–5.5)
NEUTS BAND # BLD MANUAL: 0.2 X10*3/UL (ref 0–0.5)
NEUTS BAND # BLD MANUAL: 0.82 X10*3/UL (ref 0–0.5)
NEUTS BAND NFR BLD MANUAL: 4 %
NEUTS BAND NFR BLD MANUAL: 7.7 %
NEUTS SEG # BLD MANUAL: 3.93 X10*3/UL (ref 1.6–5)
NEUTS SEG # BLD MANUAL: 8.15 X10*3/UL (ref 1.6–5)
NEUTS SEG # BLD MANUAL: 8.45 X10*3/UL (ref 1.6–5)
NEUTS SEG NFR BLD MANUAL: 76.9 %
NEUTS SEG NFR BLD MANUAL: 77 %
NEUTS SEG NFR BLD MANUAL: 91.9 %
NRBC BLD-RTO: 0 /100 WBCS (ref 0–0)
OVALOCYTES BLD QL SMEAR: ABNORMAL
OXYHGB MFR BLDA: 96.7 % (ref 94–98)
OXYHGB MFR BLDV: 40.1 % (ref 45–75)
OXYHGB MFR BLDV: 92.2 % (ref 45–75)
PATH REVIEW-CBC DIFFERENTIAL: NORMAL
PCO2 BLDA: 36 MM HG (ref 38–42)
PCO2 BLDV: 36 MM HG (ref 41–51)
PCO2 BLDV: 45 MM HG (ref 41–51)
PH BLDA: 7.46 PH (ref 7.38–7.42)
PH BLDV: 7.4 PH (ref 7.33–7.43)
PH BLDV: 7.47 PH (ref 7.33–7.43)
PHOSPHATE SERPL-MCNC: 2.9 MG/DL (ref 2.5–4.9)
PLATELET # BLD AUTO: 32 X10*3/UL (ref 150–450)
PLATELET # BLD AUTO: 38 X10*3/UL (ref 150–450)
PLATELET # BLD AUTO: 70 X10*3/UL (ref 150–450)
PO2 BLDA: 90 MM HG (ref 85–95)
PO2 BLDV: 28 MM HG (ref 35–45)
PO2 BLDV: 61 MM HG (ref 35–45)
POTASSIUM BLDA-SCNC: 3.1 MMOL/L (ref 3.5–5.3)
POTASSIUM BLDV-SCNC: 3 MMOL/L (ref 3.5–5.3)
POTASSIUM BLDV-SCNC: 3.3 MMOL/L (ref 3.5–5.3)
POTASSIUM SERPL-SCNC: 3.1 MMOL/L (ref 3.5–5.3)
POTASSIUM SERPL-SCNC: 3.7 MMOL/L (ref 3.5–5.3)
PRODUCT BLOOD TYPE: 600
PRODUCT BLOOD TYPE: 600
PRODUCT BLOOD TYPE: 6200
PRODUCT CODE: NORMAL
PROT SERPL-MCNC: 4.3 G/DL (ref 6.4–8.2)
PROT SERPL-MCNC: 4.4 G/DL (ref 6.4–8.2)
PROTHROMBIN TIME: 13.4 SECONDS (ref 9.8–12.8)
PROTHROMBIN TIME: 13.6 SECONDS (ref 9.8–12.8)
RBC # BLD AUTO: 2.45 X10*6/UL (ref 4.5–5.9)
RBC # BLD AUTO: 2.5 X10*6/UL (ref 4.5–5.9)
RBC # BLD AUTO: 2.57 X10*6/UL (ref 4.5–5.9)
RBC MORPH BLD: ABNORMAL
SAO2 % BLDA: 100 % (ref 94–100)
SAO2 % BLDV: 41 % (ref 45–75)
SAO2 % BLDV: 94 % (ref 45–75)
SODIUM BLDA-SCNC: 140 MMOL/L (ref 136–145)
SODIUM BLDV-SCNC: 140 MMOL/L (ref 136–145)
SODIUM BLDV-SCNC: 140 MMOL/L (ref 136–145)
SODIUM SERPL-SCNC: 144 MMOL/L (ref 136–145)
SODIUM SERPL-SCNC: 145 MMOL/L (ref 136–145)
TOTAL CELLS COUNTED BLD: 100
TOTAL CELLS COUNTED BLD: 117
TOTAL CELLS COUNTED BLD: 123
UNIT ABO: NORMAL
UNIT NUMBER: NORMAL
UNIT RH: NORMAL
UNIT VOLUME: 265
UNIT VOLUME: 311
UNIT VOLUME: 350
VANCOMYCIN SERPL-MCNC: 14.7 UG/ML (ref 5–20)
VARIANT LYMPHS # BLD MANUAL: 0.08 X10*3/UL (ref 0–0.3)
VARIANT LYMPHS NFR BLD: 0.8 %
WBC # BLD AUTO: 10.6 X10*3/UL (ref 4.4–11.3)
WBC # BLD AUTO: 5.1 X10*3/UL (ref 4.4–11.3)
WBC # BLD AUTO: 9.2 X10*3/UL (ref 4.4–11.3)
XM INTEP: NORMAL
XM INTEP: NORMAL

## 2024-05-23 PROCEDURE — 83735 ASSAY OF MAGNESIUM: CPT

## 2024-05-23 PROCEDURE — 2500000004 HC RX 250 GENERAL PHARMACY W/ HCPCS (ALT 636 FOR OP/ED): Performed by: STUDENT IN AN ORGANIZED HEALTH CARE EDUCATION/TRAINING PROGRAM

## 2024-05-23 PROCEDURE — 82947 ASSAY GLUCOSE BLOOD QUANT: CPT | Mod: 91

## 2024-05-23 PROCEDURE — 84132 ASSAY OF SERUM POTASSIUM: CPT | Mod: 91

## 2024-05-23 PROCEDURE — 36415 COLL VENOUS BLD VENIPUNCTURE: CPT

## 2024-05-23 PROCEDURE — 2500000004 HC RX 250 GENERAL PHARMACY W/ HCPCS (ALT 636 FOR OP/ED)

## 2024-05-23 PROCEDURE — 80202 ASSAY OF VANCOMYCIN: CPT | Performed by: STUDENT IN AN ORGANIZED HEALTH CARE EDUCATION/TRAINING PROGRAM

## 2024-05-23 PROCEDURE — 82810 BLOOD GASES O2 SAT ONLY: CPT | Mod: 91

## 2024-05-23 PROCEDURE — 83605 ASSAY OF LACTIC ACID: CPT | Mod: 91,MUE

## 2024-05-23 PROCEDURE — 0DJD8ZZ INSPECTION OF LOWER INTESTINAL TRACT, VIA NATURAL OR ARTIFICIAL OPENING ENDOSCOPIC: ICD-10-PCS | Performed by: STUDENT IN AN ORGANIZED HEALTH CARE EDUCATION/TRAINING PROGRAM

## 2024-05-23 PROCEDURE — 85730 THROMBOPLASTIN TIME PARTIAL: CPT | Mod: 91

## 2024-05-23 PROCEDURE — 85007 BL SMEAR W/DIFF WBC COUNT: CPT | Mod: 91,MUE

## 2024-05-23 PROCEDURE — 85027 COMPLETE CBC AUTOMATED: CPT

## 2024-05-23 PROCEDURE — 99233 SBSQ HOSP IP/OBS HIGH 50: CPT

## 2024-05-23 PROCEDURE — 82947 ASSAY GLUCOSE BLOOD QUANT: CPT

## 2024-05-23 PROCEDURE — 85060 BLOOD SMEAR INTERPRETATION: CPT | Performed by: PATHOLOGY

## 2024-05-23 PROCEDURE — 2500000005 HC RX 250 GENERAL PHARMACY W/O HCPCS: Performed by: STUDENT IN AN ORGANIZED HEALTH CARE EDUCATION/TRAINING PROGRAM

## 2024-05-23 PROCEDURE — 94799 UNLISTED PULMONARY SVC/PX: CPT

## 2024-05-23 PROCEDURE — 99291 CRITICAL CARE FIRST HOUR: CPT

## 2024-05-23 PROCEDURE — 85347 COAGULATION TIME ACTIVATED: CPT | Mod: 91

## 2024-05-23 PROCEDURE — 85027 COMPLETE CBC AUTOMATED: CPT | Mod: 91

## 2024-05-23 PROCEDURE — 80053 COMPREHEN METABOLIC PANEL: CPT | Mod: 91,MUE

## 2024-05-23 PROCEDURE — 85384 FIBRINOGEN ACTIVITY: CPT

## 2024-05-23 PROCEDURE — 36600 WITHDRAWAL OF ARTERIAL BLOOD: CPT

## 2024-05-23 PROCEDURE — 85576 BLOOD PLATELET AGGREGATION: CPT | Mod: 91

## 2024-05-23 PROCEDURE — 2500000005 HC RX 250 GENERAL PHARMACY W/O HCPCS

## 2024-05-23 PROCEDURE — 2500000002 HC RX 250 W HCPCS SELF ADMINISTERED DRUGS (ALT 637 FOR MEDICARE OP, ALT 636 FOR OP/ED)

## 2024-05-23 PROCEDURE — 2500000001 HC RX 250 WO HCPCS SELF ADMINISTERED DRUGS (ALT 637 FOR MEDICARE OP)

## 2024-05-23 PROCEDURE — 83605 ASSAY OF LACTIC ACID: CPT | Mod: 91

## 2024-05-23 PROCEDURE — 82805 BLOOD GASES W/O2 SATURATION: CPT | Mod: 91

## 2024-05-23 PROCEDURE — 36430 TRANSFUSION BLD/BLD COMPNT: CPT

## 2024-05-23 PROCEDURE — P9040 RBC LEUKOREDUCED IRRADIATED: HCPCS

## 2024-05-23 PROCEDURE — 84100 ASSAY OF PHOSPHORUS: CPT

## 2024-05-23 PROCEDURE — C9113 INJ PANTOPRAZOLE SODIUM, VIA: HCPCS

## 2024-05-23 PROCEDURE — 94640 AIRWAY INHALATION TREATMENT: CPT

## 2024-05-23 PROCEDURE — P9037 PLATE PHERES LEUKOREDU IRRAD: HCPCS

## 2024-05-23 PROCEDURE — 2020000001 HC ICU ROOM DAILY

## 2024-05-23 PROCEDURE — 85007 BL SMEAR W/DIFF WBC COUNT: CPT

## 2024-05-23 RX ORDER — POTASSIUM CHLORIDE 14.9 MG/ML
20 INJECTION INTRAVENOUS
Status: COMPLETED | OUTPATIENT
Start: 2024-05-23 | End: 2024-05-23

## 2024-05-23 RX ORDER — ACETAMINOPHEN 10 MG/ML
1000 INJECTION, SOLUTION INTRAVENOUS EVERY 6 HOURS PRN
Status: DISCONTINUED | OUTPATIENT
Start: 2024-05-23 | End: 2024-05-30 | Stop reason: ALTCHOICE

## 2024-05-23 RX ORDER — VANCOMYCIN HYDROCHLORIDE 1 G/200ML
1000 INJECTION, SOLUTION INTRAVENOUS ONCE
Status: COMPLETED | OUTPATIENT
Start: 2024-05-23 | End: 2024-05-23

## 2024-05-23 RX ORDER — NOREPINEPHRINE BITARTRATE/D5W 8 MG/250ML
0-3 PLASTIC BAG, INJECTION (ML) INTRAVENOUS CONTINUOUS
Status: DISCONTINUED | OUTPATIENT
Start: 2024-05-23 | End: 2024-05-24

## 2024-05-23 RX ORDER — MEROPENEM 1 G/1
1 INJECTION, POWDER, FOR SOLUTION INTRAVENOUS EVERY 12 HOURS
Status: DISCONTINUED | OUTPATIENT
Start: 2024-05-23 | End: 2024-05-25

## 2024-05-23 RX ADMIN — PANTOPRAZOLE SODIUM 40 MG: 40 INJECTION, POWDER, FOR SOLUTION INTRAVENOUS at 09:46

## 2024-05-23 RX ADMIN — SODIUM CHLORIDE, POTASSIUM CHLORIDE, SODIUM LACTATE AND CALCIUM CHLORIDE 500 ML: 600; 310; 30; 20 INJECTION, SOLUTION INTRAVENOUS at 02:16

## 2024-05-23 RX ADMIN — VANCOMYCIN HYDROCHLORIDE 1000 MG: 1 INJECTION, SOLUTION INTRAVENOUS at 12:54

## 2024-05-23 RX ADMIN — Medication 50 PERCENT: at 08:00

## 2024-05-23 RX ADMIN — SODIUM CHLORIDE, POTASSIUM CHLORIDE, SODIUM LACTATE AND CALCIUM CHLORIDE 1000 ML: 600; 310; 30; 20 INJECTION, SOLUTION INTRAVENOUS at 13:25

## 2024-05-23 RX ADMIN — Medication 50 L/MIN: at 08:00

## 2024-05-23 RX ADMIN — Medication 30 L/MIN: at 20:00

## 2024-05-23 RX ADMIN — PANTOPRAZOLE SODIUM 40 MG: 40 INJECTION, POWDER, FOR SOLUTION INTRAVENOUS at 20:06

## 2024-05-23 RX ADMIN — IPRATROPIUM BROMIDE AND ALBUTEROL SULFATE 3 ML: .5; 3 SOLUTION RESPIRATORY (INHALATION) at 11:42

## 2024-05-23 RX ADMIN — NYSTATIN 1 APPLICATION: 100000 POWDER TOPICAL at 20:22

## 2024-05-23 RX ADMIN — IPRATROPIUM BROMIDE AND ALBUTEROL SULFATE 3 ML: .5; 3 SOLUTION RESPIRATORY (INHALATION) at 07:59

## 2024-05-23 RX ADMIN — Medication 30 L/MIN: at 15:00

## 2024-05-23 RX ADMIN — Medication 0.02 MCG/KG/MIN: at 13:30

## 2024-05-23 RX ADMIN — Medication 50 L/MIN: at 03:23

## 2024-05-23 RX ADMIN — MEROPENEM 2 G: 1 INJECTION, POWDER, FOR SOLUTION INTRAVENOUS at 02:15

## 2024-05-23 RX ADMIN — ACETAMINOPHEN 1000 MG: 10 INJECTION INTRAVENOUS at 02:48

## 2024-05-23 RX ADMIN — POTASSIUM CHLORIDE 20 MEQ: 14.9 INJECTION, SOLUTION INTRAVENOUS at 21:26

## 2024-05-23 RX ADMIN — MEROPENEM 1 G: 1 INJECTION, POWDER, FOR SOLUTION INTRAVENOUS at 16:43

## 2024-05-23 RX ADMIN — POTASSIUM CHLORIDE 20 MEQ: 14.9 INJECTION, SOLUTION INTRAVENOUS at 19:56

## 2024-05-23 ASSESSMENT — COGNITIVE AND FUNCTIONAL STATUS - GENERAL
DAILY ACTIVITIY SCORE: 6
PERSONAL GROOMING: TOTAL
MOVING FROM LYING ON BACK TO SITTING ON SIDE OF FLAT BED WITH BEDRAILS: TOTAL
MOBILITY SCORE: 6
TURNING FROM BACK TO SIDE WHILE IN FLAT BAD: TOTAL
TOILETING: TOTAL
STANDING UP FROM CHAIR USING ARMS: TOTAL
HELP NEEDED FOR BATHING: TOTAL
DRESSING REGULAR UPPER BODY CLOTHING: TOTAL
DRESSING REGULAR LOWER BODY CLOTHING: TOTAL
EATING MEALS: TOTAL
MOVING TO AND FROM BED TO CHAIR: TOTAL
CLIMB 3 TO 5 STEPS WITH RAILING: TOTAL
WALKING IN HOSPITAL ROOM: TOTAL

## 2024-05-23 ASSESSMENT — PAIN SCALES - GENERAL
PAINLEVEL_OUTOF10: 0 - NO PAIN

## 2024-05-23 ASSESSMENT — PAIN - FUNCTIONAL ASSESSMENT
PAIN_FUNCTIONAL_ASSESSMENT: 0-10
PAIN_FUNCTIONAL_ASSESSMENT: 0-10

## 2024-05-23 NOTE — DOCUMENTATION CLARIFICATION NOTE
"    PATIENT:               ZAKIYA LAWSON  ACCT #:                  [id number]  MRN:                       [id number]  :                       1935  ADMIT DATE:       2024 6:29 PM  DISCH DATE:  RESPONDING PROVIDER #:        [id number]          PROVIDER RESPONSE TEXT:    Gram Negative PNA    CDI QUERY TEXT:    Clarification        Instruction:    Based on your assessment of the patient and the clinical information, please provide the requested documentation by clicking on the appropriate radio button and enter any additional information if prompted.    Question: Please further specify the type of pneumonia being treated      When answering this query, please exercise your independent professional judgment. The fact that a question is being asked, does not imply that any particular answer is desired or expected.    The patient's clinical indicators include:  Clinical Information: 87 yo M with PMHx of malignant mesothelioma on chemo, admitted for further w/u and management from OSH where he was treated empirically for sepsis presumed due to RLL Pneumonia.    Clinical Indicators:  PN- Oncology  at 1310:  \"NGT in place draining coffee ground fluid  XR chest Result Date: 2024  2. Persistent patchy right infrahilar airspace opacities which may reflect aspiration changes and/or pneumonia in the appropriate clinical setting  Pneumonia, Right lower lobe ill-defined patchy infiltrate  - Resp Cult salivary contamination  - MRSA nares: negative  - Strep/Legionella antigens: negative  - CXR  showing persistent patchy right infrahilar airspace opacities  - s/p Azithro -  - holding vanc -, - and zosyn -, -  - switch to meropenem -, - and micafungin -\"    Treatment:  -azithro, vanc, zosyn, meropenem, micafungin  -CXR  -labs, cultures    Risk Factors:  -CXR  with Persistent patchy right infrahilar airspace opacities which may reflect aspiration changes and/or " pneumonia in the appropriate clinical setting  -malignant mesothelioma on chemo  Options provided:  -- Aspiration PNA  -- Gram Negative PNA  -- Other - I will add my own diagnosis  -- Refer to Clinical Documentation Reviewer    Query created by: Gloria Berumen on 5/22/2024 4:01 PM      Electronically signed by:  ARIADNE ACEVEDO MD 5/23/2024 7:51 AM

## 2024-05-23 NOTE — SIGNIFICANT EVENT
Called to bedside around 1:15pm patient only responsive to sternal rub. BP low 80's, patient on Airvo 50L/min 40%; called Rapid Response, started 1L LR STAT wide-open with pressure bag, re-measured his BP on reverse Trendelenburg, max low 90's. Patient was eventually woken up and was talking, but seemed a little confused, saying that he was returning a boat. Peripheral levo started upon arrival of the Rapid Response team, and the patient was transferred to the MICU for further management, on levo 0.02.     Etiology of hypotension likely bleeding >> sepsis, poor PO intake (patient had been NPO; and only had one IV access until today given hematoma and the other PIV failing; Abx was given priority - currently on Vanc/Sofy/Micafungin - so had been given fluid boluses) and potentially post-transfusion reaction as he had just received 1 unit of pRBC. He has been having ongoing melena and coffee-ground/black NG output yesterday resulting in Hb drop from 9.6 --> 6.9 in a day as well as visible hematuria. INR was 6.3 at the time, corrected to 1.2 today with total 10mg of vitamin K (no FFP). NG output also clearer and biliary this AM as well as clear urine. Only ordered 1 unit of pRBC to keep Hb > 7. GI had been consulted while on the floor, GI fellow was updated on this acute change. Patient has had no prior EGD.     Wife was updated in-person; family had previous GOC discussion and the patient had been transitioned from Full Code to DNR/DNI but okay for MICU transfer. Notified the [facility] attending Dr. Marino, gave verbal signout to the MICU Gold Team seniors.     Isis Burt, PGY-3  [facility] Team

## 2024-05-23 NOTE — PROGRESS NOTES
Gastroenterology Consult Service Progress Note  Department of Gastroenterology & Hepatology  [facility]    [facility]  May 23, 2024   Patient: Vinicius Arriola    Medical Record: [id number]  Reason for Consult: coffee grounds per NG, melena  Requesting Service: [facility]/ oncology     Interval History: rectal tube still in place, still w/ dark red output. Still febrile overnight, tachycardic and tachypnic. This AM on HFNC and apperaing much more comfortable. NGT flushed this AM with green clear bilious contents. INR down to 1.2 overnight last night.  Hb trend: 10.3 5/21 --> 9.3 (5/22 AM) -> 8.4 (5/22 PM) --> 6.9 overnight last night. Transused 1 U pRBC, repeat Hb pending. Plts down to 38 today. No longer neutropenic.     Physical Exam:    Vitals:    05/23/24 0149 05/23/24 0158 05/23/24 0546 05/23/24 0800   BP: 96/61  93/65    BP Location:       Patient Position: Lying  Lying    Pulse: (!) 135  (!) 118    Resp: (!) 30  (!) 28    Temp: 37.7 °C (99.9 °F) (!) 38.8 °C (101.9 °F) 36.3 °C (97.3 °F)    TempSrc:       SpO2: 97%  97% 95%   Weight:       Height:             Intake/Output Summary (Last 24 hours) at 5/23/2024 1008  Last data filed at 5/23/2024 0546  Gross per 24 hour   Intake 2658.33 ml   Output 1350 ml   Net 1308.33 ml     Gen: chronically ill appearing, A+Ox3, uncomfortable appearing  HEENT: PEERL, EOMI, dry MM; NG tube flushed with coffee grounds that easily cleared, there were no blood clots appreciated or active bleeding  Resp: tachypnic, on 4L NC  CV: +tachy   GI: non-tender, non-distended, normal Bss  MSK: warm and well perfused  Neuro: A+Ox3, no focal deficits  Skin: warm and dry, no lesions, no rashes      Rectal: rectal tube in place with dark stool with scant blood streaks    Labs:  Lab Results   Component Value Date    GLUCOSE 106 (H) 05/23/2024    CALCIUM 7.8 (L) 05/23/2024     05/23/2024    K 3.7 05/23/2024    CO2 24 05/23/2024     05/23/2024     BUN 58 (H) 05/23/2024    CREATININE 3.00 (H) 05/23/2024     Lab Results   Component Value Date    WBC 5.1 05/23/2024    HGB 6.9 (L) 05/23/2024    HCT 22.1 (L) 05/23/2024    MCV 88 05/23/2024    PLT 38 (LL) 05/23/2024     Lab Results   Component Value Date    INR 1.2 (H) 05/23/2024    INR 2.4 (H) 05/22/2024    INR 6.3 (HH) 05/22/2024    INR 6.3 (HH) 05/22/2024    PROTIME 13.6 (H) 05/23/2024    PROTIME 27.6 (H) 05/22/2024    PROTIME 72.2 (HH) 05/22/2024    PROTIME 72.2 (HH) 05/22/2024     Lab Results   Component Value Date    ALT 34 05/23/2024    AST 58 (H) 05/23/2024    ALKPHOS 50 05/23/2024    BILITOT 0.7 05/23/2024     Imaging:    CT C/A/P without contrast 5/22:  IMPRESSION:  Chest  1. Interval improvement of the right lower lobe patchy infiltrate.  2. Again seen left lung collapse, left pleural thickening with  invasion into the left posterior chest wall, and small left loculated  pleural effusion consistent with patient's known mesothelioma and is  unchanged when compared to prior CT from 05/18/2024.  3. Unchanged prominent mediastinal and perihilar lymph nodes.  4. Previously noted left pulmonary artery thrombus is not well  evaluated on this exam due to beam hardening artifact, positioning of  patient's arms, and lack of intravenous contrast.    AXR 5/21:  IMPRESSION:  1.  The enteric tube is positioned within the gastric fundus  2. Advancement of the tube would be recommended for more optimal  positioning  3. Predominantly fluid-filled small bowel and colon pattern  4. Opacification of the visualized left thorax and multiple air  bronchograms within the left lung     AXR 5/21  IMPRESSION:  1.  The stomach is moderately distended with air otherwise,  nonspecific predominantly fluid-filled bowel pattern     CT Chest/ abd/pelvis 5/18:  IMPRESSION:  CHEST:  1. Significant collapse of the left lung with heterogenous  appearance, pleural thickening and small loculated pleural fluid  measuring 7.8 cm consistent  with the patient's known mesothelioma  which have worsened from the prior CT scan dated 06/14/2023 and  grossly unchanged from 12/10/2023 unenhanced CT scan allowing for  differences in techniques. Focus of new left posterior chest wall  invasion noted at the level of 9th 10th and 10th 11th rib spaces.  2. Right lower lobe ill-defined patchy infiltrate measuring 2.3 x 1.4  cm. Trace right-sided pleural effusion with surrounding atelectasis.  3. Redemonstrated left hilar ill-defined enhancing fullness appears  extending to the left pulmonary trunk likely tumor thrombus and felt  less likely bland thrombus which has significantly worsened from  06/14/2023 CT scan.  4. Mildly enlarged multiple mediastinal lymph nodes in the largest in  the subcarinal region measuring 1.6 cm, grossly unchanged from prior.      ABDOMEN-PELVIS:  1. Proximal sigmoid diverticulosis with mild wall thickening could be  related to episodes of underlying mild uncomplicated diverticulitis  2. Other ancillary findings are unchanged.     CT Abd/pelvis 5/17:  IMPRESSION:  1.  Subtle inflammatory change along the proximal sigmoid colon which  may represent a low-grade acute diverticulitis.  No definite  perforation or abscess.  2.  Complete collapse and consolidation of the left lung with a  loculated effusion at the left lung base, unchanged compared to the  prior chest CT and likely consistent with patient's reported  mesothelioma, possibly chronic post treatment change.  3.  Cardiomegaly with chronic changes suggesting COPD.  4.  Cholelithiasis.  5.  Moderate bilateral renal atrophy with nonobstructing 6 mm left  renal calculus.  6.  Additional chronic changes as described.     GI procedures:  No prior EGD  Reports last colonoscopy ~ 5 years ago    Assessment and Plan:        Vinicius Arriola is a 88 y.o. male with AF+ DVT (on coumadin), HTN, CAD, MR, polycythemia vera (though cannot find evidence of JAK2 mutation), malignant mesothelioma s/p L  VATS w/ decort 5/2022, XRT 9/2022 with disease recurrence now s/p  half dose pemetrexed admitted to [facility] [place] with sepsis. Gastroenterology is consulted for concern for GIB.      Patient does have worsening coagulopathy and thrombocytopenia. Likely in the setting of ongoing fevers/ sepsis. Ddx: mucosal oozing in the setting of coagulopathy, vascular lesion, gastritis/ esophagitis/duodenitis, PUD, etc.. Reassuringly today without any worsening hemodynamics, and NGT without active bleeding or coffee grounds,  though patient is now on HFNC for resp support. Improveent in coagulopathy though worsening thrombocytopenia. Will consider EGD and flex sig tomorrow, however will need to closely monitor resp status.      #coffee grounds via NGT  #melena  - NPO at [facility] for potential scopes tomorrow, will need to make ultimate determination based on clinical picture  - correct coagulopathy, can send TEG and replete factors as able   - Continue to trend Hgb BID (goal > 7), platelets (goal > 50)  - Ensure adequate IV access, ideally 2 large bore Ivs  - Would generally avoid administration of blood products to correct coagulopathy unless active hemorrhage  - would hold PO iron for now, can given IV iron while inpatient if needed   - sepsis treatment/ abx as per primary   - continue IV PPI BID  - no indication for octreotide      Patient discussed with attending, Dr. Ahn.      Ale Archibald, GI fellow    Thank you for the consultation. Gastroenterology will continue to the follow the patient.   Please do not hesitate to contact me on DocHalo or page [phone] if there are any further questions between the weekday hours of 7 AM - 5 PM.   If there is an urgent concern during the weekend, after-hours, or holidays; then please page the [place] fellow at [phone]. Thank you.    SIGNATURE: Ale Archibald MD PATIENT NAME: Vinicius Arriola   DATE: May 23, 2024 MRN: [id number]

## 2024-05-23 NOTE — NURSING NOTE
Patient received 1U PRBC. Nurse went to check vitals once infusion was complete, VS 36.1, 106, RR 24, BP 86/45. Patient was extremely lethargic, would not wake up with sternal rub, worsening mental status. Team was called and came to bedside. BP 84/62. Rapid response was called. ABG was drawn. Patient transferred to MICU. BP 91/48, , RR 26, before transfer.

## 2024-05-23 NOTE — PROGRESS NOTES
"Vinicius Arriola is a 88 y.o. male on day 4 of admission presenting with 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Multi).    Subjective   Radar rapid called at 1657 due to Temp 37.2, , RR 40, BP 86/54, pulse ox 96% . ABG obtained: pH 7.53, pCO2 31, pO2 109, Lactate 2.2. Repeat VBG showed: pH 7.47, pCO2 37, pO2 101, HCO3 26.9, lactate 2.6. 2.5 L of fluids administered overnight.     Patient reports no changes in dyspnea or cough. Denies nausea, weakness, emesis. Endorses improvement in bloating sensation.        Objective   Physical Exam   Gen: well appearing, A+Ox3, in no acute distress  HEENT: EOMI, sclera anicteric, no conjunctival injection, MMM  Resp: CTAB, decrease breath sounds on the left side, crackles on the right side  CV: RRR, no m/r/g. normal S1/S2  GI: Distended, diffuse tenderness throughout (same than yesterday), dull to percussion. no rebound or guarding, FMS tube in place w/ noted decrease in dark stool c/f melena, NGT in place draining bilious fluid   Extremities/MSK: warm and well perfused. 1+ left lower extremity pitting edema noted to mid calf, accompanied by erythema and warm to palpation.     Neuro: A+Ox3.  Skin: warm and dry, no lesions, no rashes  : boyd in place draining yellow urine     Last Recorded Vitals  Blood pressure 93/65, pulse (!) 118, temperature 36.3 °C (97.3 °F), resp. rate (!) 28, height 1.727 m (5' 7.99\"), weight 110 kg (242 lb 8.1 oz), SpO2 97%.  Intake/Output last 3 Shifts:  I/O last 3 completed shifts:  In: 2658.3 (24.2 mL/kg) [IV Piggyback:2658.3]  Out: 2550 (23.2 mL/kg) [Urine:1275 (0.3 mL/kg/hr); Emesis/NG output:1275]  Weight: 110 kg        Scheduled medications  acetaminophen, 650 mg, rectal, Once  [Held by provider] ascorbic acid, 1,000 mg, oral, Daily  [Held by provider] aspirin, 81 mg, oral, Nightly  [Held by provider] ergocalciferol, 1,250 mcg, oral, Every Sunday  [Held by provider] ferrous sulfate (325 mg ferrous sulfate), 65 mg of iron, oral, BID  ipratropium-albuteroL, 3 mL, " nebulization, q4h  meropenem, 2 g, intravenous, q12h  micafungin, 100 mg, intravenous, q24h  nystatin, 1 Application, Topical, BID  oxygen, , inhalation, Continuous - Inhalation  oxygen, , inhalation, Continuous - Inhalation  pantoprazole, 40 mg, intravenous, BID  perflutren protein A microsphere, 0.5 mL, intravenous, Once in imaging  [Held by provider] rosuvastatin, 10 mg, oral, Nightly  sulfur hexafluoride microsphr, 2 mL, intravenous, Once in imaging  [Held by provider] tamsulosin, 0.4 mg, oral, Daily  [Held by provider] traZODone, 100 mg, oral, Nightly  [Held by provider] warfarin, 2 mg, oral, Daily      PRN medications  PRN medications: acetaminophen, albuterol, carboxymethylcellulose, diclofenac sodium, guaiFENesin, melatonin, [Held by provider] nitroglycerin, ondansetron **OR** ondansetron, vancomycin     Relevant Results  Results for orders placed or performed during the hospital encounter of 05/19/24 (from the past 24 hour(s))   Type and screen   Result Value Ref Range    ABO TYPE A     Rh TYPE POS     ANTIBODY SCREEN NEG    BLOOD GAS ARTERIAL FULL PANEL   Result Value Ref Range    POCT pH, Arterial 7.53 (H) 7.38 - 7.42 pH    POCT pCO2, Arterial 31 (L) 38 - 42 mm Hg    POCT pO2, Arterial 109 (H) 85 - 95 mm Hg    POCT SO2, Arterial 100 94 - 100 %    POCT Oxy Hemoglobin, Arterial 97.8 94.0 - 98.0 %    POCT Hematocrit Calculated, Arterial 24.0 (L) 41.0 - 52.0 %    POCT Sodium, Arterial 138 136 - 145 mmol/L    POCT Potassium, Arterial 3.4 (L) 3.5 - 5.3 mmol/L    POCT Chloride, Arterial 105 98 - 107 mmol/L    POCT Ionized Calcium, Arterial 1.12 1.10 - 1.33 mmol/L    POCT Glucose, Arterial 120 (H) 74 - 99 mg/dL    POCT Lactate, Arterial 2.2 (H) 0.4 - 2.0 mmol/L    POCT Base Excess, Arterial 3.2 (H) -2.0 - 3.0 mmol/L    POCT HCO3 Calculated, Arterial 25.9 22.0 - 26.0 mmol/L    POCT Hemoglobin, Arterial 7.9 (L) 13.5 - 17.5 g/dL    POCT Anion Gap, Arterial 11 10 - 25 mmo/L    Patient Temperature 37.0 degrees Celsius     FiO2 40 %   BLOOD GAS VENOUS FULL PANEL   Result Value Ref Range    POCT pH, Venous 7.47 (H) 7.33 - 7.43 pH    POCT pCO2, Venous 37 (L) 41 - 51 mm Hg    POCT pO2, Venous 101 (H) 35 - 45 mm Hg    POCT SO2, Venous 100 (H) 45 - 75 %    POCT Oxy Hemoglobin, Venous 97.3 (H) 45.0 - 75.0 %    POCT Hematocrit Calculated, Venous 23.0 (L) 41.0 - 52.0 %    POCT Sodium, Venous 140 136 - 145 mmol/L    POCT Potassium, Venous 3.6 3.5 - 5.3 mmol/L    POCT Chloride, Venous 106 98 - 107 mmol/L    POCT Ionized Calicum, Venous 1.12 1.10 - 1.33 mmol/L    POCT Glucose, Venous 110 (H) 74 - 99 mg/dL    POCT Lactate, Venous 2.6 (H) 0.4 - 2.0 mmol/L    POCT Base Excess, Venous 3.0 -2.0 - 3.0 mmol/L    POCT HCO3 Calculated, Venous 26.9 (H) 22.0 - 26.0 mmol/L    POCT Hemoglobin, Venous 7.8 (L) 13.5 - 17.5 g/dL    POCT Anion Gap, Venous 11.0 10.0 - 25.0 mmol/L    Patient Temperature 37.0 degrees Celsius    FiO2 21 %   Comprehensive Metabolic Panel   Result Value Ref Range    Glucose 106 (H) 74 - 99 mg/dL    Sodium 145 136 - 145 mmol/L    Potassium 3.7 3.5 - 5.3 mmol/L    Chloride 105 98 - 107 mmol/L    Bicarbonate 24 21 - 32 mmol/L    Anion Gap 20 10 - 20 mmol/L    Urea Nitrogen 58 (H) 6 - 23 mg/dL    Creatinine 3.00 (H) 0.50 - 1.30 mg/dL    eGFR 20 (L) >60 mL/min/1.73m*2    Calcium 7.8 (L) 8.6 - 10.6 mg/dL    Albumin 2.1 (L) 3.4 - 5.0 g/dL    Alkaline Phosphatase 50 33 - 136 U/L    Total Protein 4.3 (L) 6.4 - 8.2 g/dL    AST 58 (H) 9 - 39 U/L    Bilirubin, Total 0.7 0.0 - 1.2 mg/dL    ALT 34 10 - 52 U/L   Vancomycin   Result Value Ref Range    Vancomycin 14.7 5.0 - 20.0 ug/mL   CBC and Auto Differential   Result Value Ref Range    WBC 5.1 4.4 - 11.3 x10*3/uL    nRBC 0.0 0.0 - 0.0 /100 WBCs    RBC 2.50 (L) 4.50 - 5.90 x10*6/uL    Hemoglobin 6.9 (L) 13.5 - 17.5 g/dL    Hematocrit 22.1 (L) 41.0 - 52.0 %    MCV 88 80 - 100 fL    MCH 27.6 26.0 - 34.0 pg    MCHC 31.2 (L) 32.0 - 36.0 g/dL    RDW 16.5 (H) 11.5 - 14.5 %    Platelets 38 (LL) 150 -  450 x10*3/uL    Immature Granulocytes %, Automated 5.1 (H) 0.0 - 0.9 %    Immature Granulocytes Absolute, Automated 0.26 0.00 - 0.50 x10*3/uL   Magnesium   Result Value Ref Range    Magnesium 2.36 1.60 - 2.40 mg/dL   Phosphorus   Result Value Ref Range    Phosphorus 2.9 2.5 - 4.9 mg/dL   Blood Gas Venous Full Panel   Result Value Ref Range    POCT pH, Venous 7.47 (H) 7.33 - 7.43 pH    POCT pCO2, Venous 36 (L) 41 - 51 mm Hg    POCT pO2, Venous 61 (H) 35 - 45 mm Hg    POCT SO2, Venous 94 (H) 45 - 75 %    POCT Oxy Hemoglobin, Venous 92.2 (H) 45.0 - 75.0 %    POCT Hematocrit Calculated, Venous 22.0 (L) 41.0 - 52.0 %    POCT Sodium, Venous 140 136 - 145 mmol/L    POCT Potassium, Venous 3.3 (L) 3.5 - 5.3 mmol/L    POCT Chloride, Venous      POCT Ionized Calicum, Venous 1.17 1.10 - 1.33 mmol/L    POCT Glucose, Venous 107 (H) 74 - 99 mg/dL    POCT Lactate, Venous 2.1 (H) 0.4 - 2.0 mmol/L    POCT Base Excess, Venous 2.4 -2.0 - 3.0 mmol/L    POCT HCO3 Calculated, Venous 26.2 (H) 22.0 - 26.0 mmol/L    POCT Hemoglobin, Venous 7.3 (L) 13.5 - 17.5 g/dL    POCT Anion Gap, Venous      Patient Temperature 37.0 degrees Celsius    FiO2 21 %   Coagulation Screen   Result Value Ref Range    Protime 13.6 (H) 9.8 - 12.8 seconds    INR 1.2 (H) 0.9 - 1.1    aPTT 27 27 - 38 seconds   Manual Differential   Result Value Ref Range    Neutrophils %, Manual 77.0 40.0 - 80.0 %    Bands %, Manual 4.0 0.0 - 5.0 %    Lymphocytes %, Manual 10.0 13.0 - 44.0 %    Monocytes %, Manual 4.0 2.0 - 10.0 %    Eosinophils %, Manual 1.0 0.0 - 6.0 %    Basophils %, Manual 0.0 0.0 - 2.0 %    Metamyelocytes %, Manual 2.0 0.0 - 0.0 %    Myelocytes %, Manual 2.0 0.0 - 0.0 %    Seg Neutrophils Absolute, Manual 3.93 1.60 - 5.00 x10*3/uL    Bands Absolute, Manual 0.20 0.00 - 0.50 x10*3/uL    Lymphocytes Absolute, Manual 0.51 (L) 0.80 - 3.00 x10*3/uL    Monocytes Absolute, Manual 0.20 0.05 - 0.80 x10*3/uL    Eosinophils Absolute, Manual 0.05 0.00 - 0.40 x10*3/uL     Basophils Absolute, Manual 0.00 0.00 - 0.10 x10*3/uL    Metamyelocytes Absolute, Manual 0.10 0.00 - 0.00 x10*3/uL    Myelocytes Absolute, Manual 0.10 0.00 - 0.00 x10*3/uL    Total Cells Counted 100     Neutrophils Absolute, Manual 4.13 1.60 - 5.50 x10*3/uL    RBC Morphology See Below     Ovalocytes Few       XR chest 1 view    Result Date: 5/23/2024  Interpreted By:  Jovany Nuñez and Baker Zachary STUDY: XR CHEST 1 VIEW; ;  5/22/2024 6:45 pm   INDICATION: Signs/Symptoms:Worsening tachypnea, confusion.   COMPARISON: Chest radiograph on 05/21/2024.   ACCESSION NUMBER(S): [id number]   ORDERING CLINICIAN: SUN CHAMPAGNE   FINDINGS: Single AP view of the chest.   Right subclavian vein approach pacemaker/AICD in place with leads projecting over the right atrium and ventricle. Enteric tube coursing below diaphragm tip overlying the expected position of the gastric body.   The cardiomediastinal silhouette is obscured, similar to prior exam.   Persistent near-complete opacification of the left hemithorax, with mild interval increase in air bronchograms within the left upper lung zone. Redemonstration of patchy right infrahilar airspace opacities and interstitial prominence on the right. No right-sided pleural effusion or pneumothorax.   No acute osseous abnormality.       1. Persistent near-complete opacification of the left hemithorax, with mild interval increase in air bronchograms within the left upper lung zone. 2. Persistent patchy right infrahilar airspace opacities and interstitial prominence throughout the right lung, which may represent aspiration/pneumonia in the appropriate clinical setting. 3. Medical devices as above.   I personally reviewed the images/study and I agree with the findings as stated by Dr. Raymond Armstrong M.D. This study was interpreted at [facility], [place], [place].   MACRO: None   Signed by: Jovany Nuñez 5/23/2024 7:32 AM Dictation workstation:    [id number]    CT chest abdomen pelvis wo IV contrast    Result Date: 5/23/2024  Interpreted By:  Sohail Torres,  and Jeff Ibrahim STUDY: CT CHEST ABDOMEN PELVIS WO CONTRAST;  5/22/2024 12:15 pm   INDICATION: Signs/Symptoms:c/f sepsis.   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 N/V/D with [employer] concernign for UPI GIB and ongoing fevers.   COMPARISON: CT chest/abdomen/pelvis 05/18/2024   ACCESSION NUMBER(S): [id number]   ORDERING CLINICIAN: SUN CHAMPAGNE   TECHNIQUE: CT of the chest, abdomen and pelvis was performed. Contiguous axial images were obtained at 3 mm slice thickness through the chest, abdomen and pelvis. Coronal and sagittal reconstructions at 3 mm slice thickness were performed.  No intravenous contrast was administered; positive oral contrast was given.   FINDINGS: Please note that the study is limited without intravenous contrast.   Respiratory motion artifact.   CHEST:   LUNG/PLEURA/LARGE AIRWAYS: Trachea and bilateral mainstem bronchi are patent.   Again seen complete opacification of the left hemithorax with air bronchograms consistent with significant collapse of the left lung.   There is left pleural thickening (example series 2, image 38) with a focus of left posterior chest wall extension at the level of the 9th-10th (series 2, image 71) and 10th-11th (series 2, image 82) rib spaces, similar to prior, findings consistent with patient's known mesothelioma.   Similar small loculated pleural effusion measuring 7.1 x 4.1 x 2.3 cm (series 2, image 66 and series 900, image 62).   Interval improvement of right lower lobe ill-defined patchy infiltrate (series 4, image 143). Slight interval increase in right trace pleural effusion. No new focal consolidations. No evidence of pneumothorax.   VESSELS: The previously noted ill-defined hypodensity in the left pulmonary  artery is not well evaluated on this exam due to beam hardening artifact, positioning of patient's arms, and lack of venous contrast.   Dilated main pulmonary artery measuring 3.8 cm (series 2, image 40), similar to prior. Ectatic ascending aorta measuring 4.1 cm, similar to prior.  Mild to moderate coronary artery calcifications are present.   HEART: The heart is normal in size.  Trace pericardial fluid, similar to prior. Right-sided subclavian approach dual-chambercardiac ICD/pacemaker, with the leads in the right atrium and right ventricle.   MEDIASTINUM AND ANDREA: Multiple prominent mediastinal and perihilar lymph nodes similar to prior. For example:   1.3 cm subcarinal lymph node (series 2, image 48).   1 cm perihilar lymph node (series 2, image 33).   Enteric tube courses through the esophagus and terminates in the body of the stomach. Otherwise otherwise esophagus is within normal limits.   CHEST WALL AND LOWER NECK: Right chest wall cardiac pacemaker as described above. The visualized thyroid gland appears within normal limits.   ABDOMEN:   LIVER: The liver is normal in size. Similar hypoattenuating lesions with the largest measuring 2 cm segment 4A (series 2, image 71).   BILE DUCTS: The bile ducts are not dilated.   GALLBLADDER: The gallbladder is not distended. Calcified stones are noted.   PANCREAS: The pancreas appears unremarkable.   SPLEEN: Within normal limits.   ADRENAL GLANDS: Bilateral adrenal glands appear normal.   KIDNEYS AND URETERS: Bilateral mildly atrophic kidneys with mild perinephric stranding similar to prior. There is a 0.6 cm left midpole nonobstructing renal calculi. No hydroureteronephrosis. No right nephroureterolithiasis.   PELVIS:   BLADDER: The urinary bladder is underdistended with Forrest catheter in place. There is air in the urinary bladder likely from the Forrest.   REPRODUCTIVE ORGANS: No pelvic masses.   BOWEL: NG tube body of the stomach. Otherwise, the stomach is  unremarkable. The small is normal in caliber and demonstrate no wall thickening. Proximal sigmoid diverticulosis with interval improvement in the mild wall thickening and adjacent fat stranding involving the proximal sigmoid colon (series 902, image 93). Interval insertion of rectal tube.   VESSELS: Mild atherosclerosis of the abdominal aorta and its branching vessels. There is no aneurysmal dilatation of the abdominal aorta..   Interval flattening of the inferior vena cava (image 2, image 121)/   PERITONEUM/RETROPERITONEUM/LYMPH NODES: No ascites or free air, no fluid collection.  The retroperitoneum appears unremarkable.  No enlarged mesenteric lymph nodes.   ABDOMINAL WALL: Within normal limits.   BONES: No suspicious osseous lesions are present. Degenerative discogenic disease is noted in the lower thoracic and lumbar spine most severe at L3-L4, L4-L5, and L5-L6..       Chest 1. Interval improvement of the right lower lobe patchy infiltrate. 2. Again seen left lung collapse, left pleural thickening with invasion into the left posterior chest wall, and small left loculated pleural effusion consistent with patient's known mesothelioma and is unchanged when compared to prior CT from 05/18/2024. 3. Unchanged prominent mediastinal and perihilar lymph nodes. 4. Previously noted left pulmonary artery thrombus is not well evaluated on this exam due to beam hardening artifact, positioning of patient's arms, and lack of intravenous contrast.   Abdomen-Pelvis 1. Interval flattening of the inferior vena cava which may be seen with hypovolemia. Recommend correlation with patient's fluid volume status. 2. Improved proximal sigmoid diverticulitis. 3. Additional unchanged findings as noted above.   I personally reviewed the images/study and I agree with the findings as stated by Patrice Aguilar DO, PGY-2. This study was interpreted at [facility], [place], [place].   MACRO: None   Signed by:  Sohail Torres 5/23/2024 12:22 AM Dictation workstation:   [id number]    Transthoracic Echo (TTE) Complete    Result Date: 5/21/2024   [facility], [address] [place] [address]                Tel [phone] and Fax [phone] TRANSTHORACIC ECHOCARDIOGRAM REPORT  Patient Name:      ZAKIYA LAWSON       Reading Physician:    [id number] Delvis Miller MD Study Date:        5/21/2024            Ordering Provider:    [id number] CHAMPAGNE MRN/PID:           [id number]             Fellow: Accession#:        [id number]         Nurse: Date of Birth/Age: 1935 / 88 years Sonographer:          Dionisio Braga RDCS Gender:            M                    Additional Staff: Height:            172.72 cm            Admit Date: Weight:            109.77 kg            Admission Status:     Inpatient -                                                               Routine BSA / BMI:         2.22 m2 / 36.80      Encounter#:           [id number]                    kg/m2                                         Department Location:  [place] [address] Blood Pressure: 96 /57 mmHg Study Type:    TRANSTHORACIC ECHO (TTE) COMPLETE Diagnosis/ICD: Unspecified atrial fibrillation-I48.91; Hypertensive heart                disease with heart failure-I11.0 Indication:    hypoxic resp failure w/ new o2 requirement c/f HF CPT Code:      Echo Complete w Full Doppler-[id number] Patient History: Pertinent History: PAD, afib/flutter, hx DVT on warfarin, HTN, CAD s/p stent,                    mitral regurg, HLD,JOVANNI, basal cell ca on face, s/p removal                    and radiation of malignant mesothelioma, S/p L VATS with                    decort 5/2022. Study Detail: The following Echo studies were performed: 2D, M-Mode, color flow                and Doppler. Technically challenging study due to body habitus,               patient lying in supine position, poor acoustic windows and               prominent lung artifact. Definity used as a contrast agent for               endocardial border definition. Total contrast used for this               procedure was 2.0 mL via IV push. Unable to obtain suprasternal               notch view.  PHYSICIAN INTERPRETATION: Left Ventricle: The left ventricular systolic function is hyperdynamic, with an estimated ejection fraction of 70-75%. The patient is in atrial fibrillation which may influence the estimate of left ventricular function and transvalvular flows. There are no regional wall motion abnormalities. The left ventricular cavity size is normal. Left ventricular diastolic filling was not assessed. Left Atrium: The left atrium was not well visualized. Right Ventricle: The right ventricle was not well visualized. Unable to determine right ventricular systolic function. Right Atrium: The right atrium was not well visualized. Aortic Valve: The aortic valve is trileaflet. There is trivial aortic valve regurgitation. The peak instantaneous gradient of the aortic valve is 13.2 mmHg. Mitral Valve: The mitral valve is normal in structure. There is mild mitral annular calcification. There is trace mitral valve regurgitation. Tricuspid Valve: The tricuspid valve was not well visualized. Tricuspid regurgitation was not well visualized. Tricuspid regurgitation was not assessed. The Doppler estimated RVSP is mildly elevated at 36.6 mmHg. Pulmonic Valve: The pulmonic valve is not well visualized. There is physiologic pulmonic valve regurgitation. Pericardium: There is a trivial pericardial effusion. Aorta: The aortic root is abnormal. The aortic root appears moderately dilated and measures 4.50 cm. The Asc Ao is 3.70 cm. There is mild dilatation of the ascending aorta. Systemic Veins: The inferior vena cava appears to be of  normal size. There is IVC inspiratory collapse greater than 50%. In comparison to the previous echocardiogram(s): Compared with study from 5/3/2022, the previous study was a MARY during a MARY/DCCV and comparison is limited.  CONCLUSIONS:  1. Poorly visualized anatomical structures due to suboptimal image quality.  2. Left ventricular systolic function is hyperdynamic with a 70-75% estimated ejection fraction.  3. The patient is in atrial fibrillation which may influence the estimate of left ventricular function and transvalvular flows.  4. Mildly elevated RVSP.  5. Moderately dilated aortic root. QUANTITATIVE DATA SUMMARY: 2D MEASUREMENTS:                          Normal Ranges: Ao Root d:     4.50 cm   (2.0-3.7cm) LAs:           2.50 cm   (2.7-4.0cm) IVSd:          0.80 cm   (0.6-1.1cm) LVPWd:         0.80 cm   (0.6-1.1cm) LVIDd:         4.10 cm   (3.9-5.9cm) LVIDs:         2.20 cm LV Mass Index: 43.9 g/m2 LV % FS        46.3 % AORTA MEASUREMENTS:                    Normal Ranges: Asc Ao, d: 3.70 cm (2.1-3.4cm) LV SYSTOLIC FUNCTION BY 2D PLANIMETRY (MOD):                     Normal Ranges: EF-A4C View: 73.6 % (>=55%) EF-A2C View: 80.4 % EF-Biplane:  78.8 % LV DIASTOLIC FUNCTION:                     Normal Ranges: MV Peak E: 1.09 m/s (0.7-1.2 m/s) MV DT:     245 msec (150-240 msec) MITRAL VALVE:                 Normal Ranges: MV DT: 245 msec (150-240msec) AORTIC VALVE:                          Normal Ranges: AoV Vmax:      1.82 m/s  (<=1.7m/s) AoV Peak P.2 mmHg (<20mmHg) LVOT Max Philipp:  1.42 m/s  (<=1.1m/s) LVOT VTI:      18.80 cm LVOT Diameter: 2.00 cm   (1.8-2.4cm) AoV Area,Vmax: 2.45 cm2  (2.5-4.5cm2)  RIGHT VENTRICLE: TAPSE: 15.8 mm RV s'  0.18 m/s TRICUSPID VALVE/RVSP:                             Normal Ranges: Peak TR Velocity: 2.90 m/s RV Syst Pressure: 36.6 mmHg (< 30mmHg) PULMONIC VALVE:                      Normal Ranges: PV Max Philipp: 0.9 m/s  (0.6-0.9m/s) PV Max PG:  3.2 mmHg  [id number] Delvis Milelr MD  Electronically signed on 5/21/2024 at 5:46:15 PM  ** Final **     XR abdomen 1 view    Result Date: 5/21/2024  Interpreted By:  Jovany Nuñez, STUDY: XR ABDOMEN 1 VIEW;  5/21/2024 12:58 pm   INDICATION: Signs/Symptoms:post ngt.   COMPARISON: One-view abdomen 05/21/2024 13 a.m.   ACCESSION NUMBER(S): [id number]   ORDERING CLINICIAN: SUN CHAMPAGNE   FINDINGS: One-view abdomen   An enteric tube is positioned within the region of the gastric fundus several cm below the expected gastroesophageal junction. There is a predominantly fluid-filled small bowel and colon pattern. There is less gastric distention with air.   Opacification of the left thorax and multiple air bronchograms are noted.       1.  The enteric tube is positioned within the gastric fundus 2. Advancement of the tube would be recommended for more optimal positioning 3. Predominantly fluid-filled small bowel and colon pattern 4. Opacification of the visualized left thorax and multiple air bronchograms within the left lung   MACRO: None   Signed by: Jovany Nuñez 5/21/2024 2:19 PM Dictation workstation:   [id number]    XR abdomen 1 view    Result Date: 5/21/2024  Interpreted By:  Jovany Nuñez, STUDY: XR ABDOMEN 1 VIEW;  5/21/2024 11:13 am   INDICATION: Signs/Symptoms:ABD DISTENTION.   COMPARISON: None.   ACCESSION NUMBER(S): [id number]   ORDERING CLINICIAN: SUN CHAMPAGNE   FINDINGS: The stomach is distended with air. There is a predominantly fluid-filled small bowel and colon pattern. Gas is demonstrated within the colon and distal ileum. The left ventricular pacemaker electrode appears in adequate position. The left ventricle appears mildly enlarged. Osseous and articular anatomy is normal for age.       1.  The stomach is moderately distended with air otherwise, nonspecific predominantly fluid-filled bowel pattern   MACRO: None   Signed by: Jovany Nuñez 5/21/2024 2:17 PM Dictation workstation:   [id number]      This patient has a urinary  catheter   Reason for the urinary catheter remaining today? urinary retention/bladder outlet obstruction, acute or chronic    Assessment/Plan   Vinicius Arriola is a 88 y.o. year old male patient with Past Medical History of  PAD, afib/flutter, hx DVT on warfarin, HTN, CAD s/p stent, mitral regurg, HLD, polycythemia vera, managed by Dr. Rothman ([facility]), JOVANNI, basal cell ca on face, s/p removal and radiation of malignant mesothelioma, S/p L VATS with decort 5/2022  c/b post op ileus, XRT 9-10/2022, found to have a recurrence of Mesothelioma on PET scan and underwent first chemotherapy session on 5/15/2024, after which he developed fever, diarrhea, nausea an vomiting. Admitted to [facility] and treated empirically for sepsis presumed due to RLL Pneumonia iso of CT CAP showing significant collapse of the left lung worsened appearence of known Mesothelioma with concern for tumor thrombus, new chest wall invasion, right lower lobe ill-defined patchy infiltrate, as well as mild uncomplicated diverticulitis. Hospital course complicated with hypotension that responded to fluids, and new oxygen requirement. The patient is currently HDS with no indication for ICU.     5/22/24 Updates:   - INR 6.3 > 2.4 > 1.2 s/p 10 mg Vitamin K, continuing to hold warfarin  - bilious fluid via NGT tube, continuing to monitor output   - +melena, although decreased stool output   - Creatinine 1.64>3, suspected prerenal VIK  - BP 93/65, 2.5 L fluids administered overnight  - Hgb 9.4>6.9  - Patient consented to 1 unit RBC  - Reorder CBC post-transfusion   - 5/22 CT CAP w/o contrast: Interval improvement of the right lower lobe patchy infiltrate, Interval flattening of the inferior vena cava which may be seen with hypovolemia, Improved proximal sigmoid diverticulitis.      PULMONARY  #Pneumonia, Right lower lobe ill-defined patchy infiltrate  :: Resp Cult salivary contamination  :: MRSA nares: negative  :: Strep/Legionella antigens: negative  :: CXR  "5/21 showing persistent patchy right infrahilar airspace opacities    :: 5/22 CT CAP w/o contrast: Interval improvement of the right lower lobe patchy infiltrate  :: s/p Azithro (5/16-5/18)  :: s/p vanc (5/16-5/18) (5/20-5/22) and zosyn (5/16-5/18) (5/20-22)  - meropenem (5/19-5/20) (5/22-) and micafungin (5/22-)     #Tachypnea   #elevated A-a gradient   #AHRF, improved  :: multifactorial, like d/t shunt secondary to malignancy vs pneumonia vs v/q mismatch   :: RR 23-44   :: 5/22 ABG: pH 7.53, pCO2 31, pO2 109, Lactate 2.2, FiO2 40.  - On 3L nasal cannula with intermittent AIRVO (5/22-) to assist with work of breathing   - gradually wean supplemental O2 for SpO2 goal >90%   - continue abx regimen  - CTM     # Worsening mesothelioma  # tumor Thrombus  #Mesothelioma with Loculated Pleural Effusions   #Left Hilar Mass, Left Pulmonary Trunk Mass  #History JOVANNI, Pulmonary Embolism  :: primary oncologist Dr. Galvan  :: s/p L VATS w/ decort 5/2022, XRT 9-10/22  :: s/p \"half-dose\" pemfexy on 5/14  :: CT chest 5/18 with significant collapse of left lung, loculated pleural effusions consistent with patient's known mesothelioma.  Also shows ill-defined patchy infiltrate in right lower lobe.  Shows left hilar mass with extension into left pulmonary trunk likely tumor thrombus   - Holding warfarin, continue to monitor   - Dr. Henderson recommends transitioning to Eliquis 5mg BID, deferring anticoagulation iso thrombocytopenia  - continuing protocol w/ 1 mg folic acid daily       INFECTIOUS DISEASE   #Fever  #Sepsis most likely d/t pneumonia  #Lower leg skin changes likely d/t cellulitis   :: at OSH met SIRS criteria (febrile, tachypnea)  :: UA 5/18 with small LE, 3+ bacteria   :: Resp Cult salivary contamination  :: MRSA nares: negative  :: Strep/Legionella antigens: negative  :: c diff and enteric stool panel: negative  :: BC 5/16 NGTD, repeat BC 5/20 NGTD  :: urine cx 5/21 negative  :: CRP 30.46>42.36  :: CXR 5/21 showing " persistent patchy right infrahilar airspace opacities    :: s/p Azithro (5/16-5/18)  :: asymmetric warmth, erythema, edema noted in left lower extremity   :: s/p vanc (5/16-5/18) (5/20-5/22) and zosyn (5/16-5/18) (5/20-22)  :: 5/22 CT CAP w/o contrast: Interval improvement of the right lower lobe patchy infiltrate, Interval flattening of the inferior vena cava which may be seen with hypovolemia, Improved proximal sigmoid diverticulitis.  - repeat blood cultures drawn 5/22, pending   - meropenem (5/19-5/20) (5/22-) and micafungin (5/22-)   - improved margins of left lower extremity skin changes, will ctm       RENAL   #VIK on CKD, likely prerenal d/t fluid status   # CKD (baseline Cr 1.3-1.4)  # urine retention s/p Boyd Catheter Placement by Urology in OSH  :: US renal 5/16: Nonobstructing 1 cm left renal calculus, no hydronephrosis  :: Baseline Cr 1.3-1.4  :: Cr 1.64>2.26>3.00  - will eventually need boyd removal and trial  - strict in/out  - tamsulosin 0.4 mg daily- holding iso NPO  -minimize nephrotoxic medications as able  - 2.5 L fluids given overnight (5/23)   - CTM       GI  #coffee grounds via NGT (placed 5/21), improved   #melena likely d/t GI bleed, improved  :: KUB 5/21, showing moderate distention of stomach with nonspecific predominantly fluid-filled bowel pattern.  :: 5/20-5/21 patient emesis that is watery, dark brown, malodorous   :: did initially present w/ diarrhea could have been chemo induced vs stool ball  :: INR 4.9>6.3>2.4 >1.2  :: s/p Vitamin K 10 mg  - holding ferrous sulfate    - ctm NGT output   - pantoprazole 40 bid   - GI consulted, appreciate recs      HEMATOLOGY  #anemia, likely d/t GI bleed   :: melena and coffee grounds via NGT noted 5/22  :: Hgb 10.3>9.4>6.9  - Patient consented to 1 unit RBC, 5/23  - Reorder CBC post-transfusion   - Continue to trend Hgb BID (goal > 7), platelets (goal > 50)    #Pancytopenia, most likely medication induced from pemfexy  #Thrombocytopenia  :: c/f  TTP, blood smear pending   ::   :: s/p pemfexy 5/14  :: d dimer 1521, fibrinogen >1000- no c/f dic  :: no schistocytes on smear  ::   :: filgrastim 480 mcg (5/21-5/22)  - 5/23 ANC 4130   - haptoglobin 400   - Platelets 55>38, ctm     #Elevated INR  :: INR 4.9>6.3>2.4 >1.2  :: s/p Vitamin K 10 mg  -CTM    CARDIOVASCULAR   #History Afib, HLD, CAD, PAD  #History DVT/PE on Warfarin  :: 5/21 TTE showing:  Left ventricular systolic function is hyperdynamic with a 70-75% estimated ejection fraction, Mildly elevated RVSP, Moderately dilated aortic root   - Holding home metoprolol 25 mg twice daily  - Holding warfarin, aspirin  - Continue home statin- holding iso npo    NEUROLOGY   # Hospital-acquired Delirium  :: waxing and waning altered mental status  - apply delirium precautions  - pulled out NGT on 5/22- now on 1:1 sitter    #Insomnia  - holding home trazodone     Antibiotics:: meropenem + micafungin   Dispo: pending PT/ OT snf     F: Replete PRN  E: Keep Mg >2, phos >3  and K >4  N: NPO  A: PIV  O2: On NC 3 L  DVT ppx: none iso thrombocytopenia  Code Status: DNR/DNI   Contact: Kirsten Arriola (Spouse)  [phone] (Work Phone)        MYLA AUSTIN  Medical Student     I agree with the above assessment and plan as written by the excellent medical student,   Ernestine Owen  Internal Medicine, PGY-1

## 2024-05-23 NOTE — PROGRESS NOTES
Physical Therapy                 Therapy Communication Note    Patient Name: Vinicius Arriola  MRN: [id number]  Today's Date: 5/23/2024     Discipline: Physical Therapy    Missed Visit Reason: Missed Visit Reason: Patient placed on medical hold (Pt not medically appropriate for PT per RN.)    Missed Time: Attempt    Claudia Ponce, PT

## 2024-05-23 NOTE — PROGRESS NOTES
Occupational Therapy    Therapy Communication Note    Patient Name: Vinicius Arriola  MRN: [id number]  Today's Date: 5/23/2024     Missed Visit: Yes  Missed Visit Reason: Patient placed on medical hold      05/23/24 at 1:18 PM   Reshma ORTIZ/L, OTD  Rehab Office: [phone]

## 2024-05-23 NOTE — SIGNIFICANT EVENT
Service Transfer Note  Brief Hospital Course:  Vinicius Arriola 87 y/o M w/ PMHx of PAD, afib/aflutter, hx DVT on warfarin, HTN, CAD s/p stent, mitral regurg, HLD, polycythemia vera (managed by Dr. Rothman), JOVANNI, malignant mesothelioma s/p L VATS w/ decort 5/2022 c/b post-op ileus, XRT 9-10/2022 who initially presented to  [facility] w/ abdominal pain, fever and diarrhea after premetrexed (Cycle 1) on 5/14. He had urinary retention and boyd was placed. However, pt became hypotensive which was minimally responsive for IVF causing him to go to the ICU. Bedside ultrasound showed collapsible IVC, and he was given 1.5 L LR bolus. CT CAP w/ contrast was ordered which showed known L lung collapse d/t mesothelioma, but new L posterior chest wall invasion in 9-11th rib spaces, RLL infiltration w/ pleural effusion and atelectasis, and new concern for L pulmonary trunk tumor thrombus. There was also mild diverticulitis. The patient was initially treated with vanc/zosyn/azithromycin (5/16-5/18), and switched to meropenem (5/19-5/20). Sepsis work-up at OSH had MRSa nares negative, strep/legionella antigen negative, blood cx negative, urine cx negative. He was started on maintenance fluids and admitted to  on regular nursing floor.     On arrival to , meropenem switched to vanc/zosyn (5/20-5/22). Enteric stool panel negative, C diff negative. He was also initially diuresed with IV lasix 40 x2 d/t concern for volume overload contributing to hypoxia given b/l pitting edema in lower extremities. Pt had abdominal distension and 2 episodes of emesis with dark brown foul smelling emesis. KUB showed moderate distension of the stomach w/ nonspecific fluid-filled bowel pattern. NGT was placed and pt was made NPO and all oral meds were held. He became pancytopenic, which was attributed to his chemotherapy and was given neupogen x2 until ANC >1000. Pt continued to be febrile, and he was switched to meropenem and micafungin. CT CAP w/o  contrast was ordered to help assess infection source and noted interval improvement in RLL infiltrate and diverticulitis.     His INR was supratherapeutic, and warfarin was held (last dose 5/19). Given new tumor thrombus, anticoagulation was discussed with outpatient cardiologist, and decision was made to convert to eliquis when appropriate. Despite holding the warfarin, his INR continued to increase. His INR peaked at 6.3 and was given 10 mg of Vitamin K w/ improvement in his INR. There was noted melena, hematuria, and coffee ground drainage from the NGT concerning for bleeding. DIC labs were sent and were negative. GI was consulted d/t concern for GI bleed and he was started on PPI. His Hgb dropped ~3 points and was transfued 1 unit pRBC. As INR decreased, his hematuria and coffee ground NG output resolved, but still had persistent melena.     On 5/23, rapid response called for low BP and lethargy. Pt arousable only to sternal rub. 1 L LR was given w/ minimal improvement in mental status and pressure. He was started on peripheral levo and transferred to the MICU for further management.     Updated Assessment and Plan:  PULMONARY  #Pneumonia, Right lower lobe ill-defined patchy infiltrate  :: Resp Cult salivary contamination  :: MRSA nares: negative  :: Strep/Legionella antigens: negative  :: CXR 5/21 showing persistent patchy right infrahilar airspace opacities    :: 5/22 CT CAP w/o contrast: Interval improvement of the right lower lobe patchy infiltrate  :: s/p Azithro (5/16-5/18)  :: s/p vanc (5/16-5/18) (5/20-5/22) and zosyn (5/16-5/18) (5/20-22)  - meropenem (5/19-5/20) (5/22-) and micafungin (5/22-)      #Tachypnea   #elevated A-a gradient   #AHRF, improved  :: multifactorial, like d/t shunt secondary to malignancy vs pneumonia vs v/q mismatch   :: RR 23-44   :: 5/22 ABG: pH 7.53, pCO2 31, pO2 109, Lactate 2.2, FiO2 40.  - On 3L nasal cannula with intermittent AIRVO (5/22-) to assist with work of breathing  "  - gradually wean supplemental O2 for SpO2 goal >90%   - continue abx regimen  - CTM      # Worsening mesothelioma  # tumor Thrombus  #Mesothelioma with Loculated Pleural Effusions   #Left Hilar Mass, Left Pulmonary Trunk Mass  #History JOVANNI, Pulmonary Embolism  :: primary oncologist Dr. Galvan  :: s/p L VATS w/ decort 5/2022, XRT 9-10/22  :: s/p \"half-dose\" pemfexy on 5/14  :: CT chest 5/18 with significant collapse of left lung, loculated pleural effusions consistent with patient's known mesothelioma.  Also shows ill-defined patchy infiltrate in right lower lobe.  Shows left hilar mass with extension into left pulmonary trunk likely tumor thrombus   - Holding warfarin, continue to monitor   - Dr. Henderson recommends transitioning to Eliquis 5mg BID, deferring anticoagulation iso thrombocytopenia  - continuing protocol w/ 1 mg folic acid daily         INFECTIOUS DISEASE   #Fever  #Sepsis most likely d/t pneumonia  #Lower leg skin changes likely d/t cellulitis   :: at OSH met SIRS criteria (febrile, tachypnea)  :: UA 5/18 with small LE, 3+ bacteria   :: Resp Cult salivary contamination  :: MRSA nares: negative  :: Strep/Legionella antigens: negative  :: c diff and enteric stool panel: negative  :: BC 5/16 NGTD, repeat BC 5/20 NGTD  :: urine cx 5/21 negative  :: CRP 30.46>42.36  :: CXR 5/21 showing persistent patchy right infrahilar airspace opacities    :: s/p Azithro (5/16-5/18)  :: asymmetric warmth, erythema, edema noted in left lower extremity   :: s/p vanc (5/16-5/18) (5/20-5/22) and zosyn (5/16-5/18) (5/20-22)  :: 5/22 CT CAP w/o contrast: Interval improvement of the right lower lobe patchy infiltrate, Interval flattening of the inferior vena cava which may be seen with hypovolemia, Improved proximal sigmoid diverticulitis.  - repeat blood cultures drawn 5/22, pending   - meropenem (5/19-5/20) (5/22-) and micafungin (5/22-)   - improved margins of left lower extremity skin changes, will ctm         RENAL   #VIK " on CKD, likely prerenal d/t fluid status   # CKD (baseline Cr 1.3-1.4)  # urine retention s/p Boyd Catheter Placement by Urology in OSH  :: US renal 5/16: Nonobstructing 1 cm left renal calculus, no hydronephrosis  :: Baseline Cr 1.3-1.4  :: Cr 1.64>2.26>3.00  - will eventually need boyd removal and trial  - strict in/out  - tamsulosin 0.4 mg daily- holding iso NPO  -minimize nephrotoxic medications as able  - 2.5 L fluids given overnight (5/23)   - CTM         GI  #coffee grounds via NGT (placed 5/21), improved   #melena likely d/t GI bleed, improved  :: KUB 5/21, showing moderate distention of stomach with nonspecific predominantly fluid-filled bowel pattern.  :: 5/20-5/21 patient emesis that is watery, dark brown, malodorous   :: did initially present w/ diarrhea could have been chemo induced vs stool ball  :: INR 4.9>6.3>2.4 >1.2  :: s/p Vitamin K 10 mg  - holding ferrous sulfate    - ctm NGT output   - pantoprazole 40 bid   - GI consulted, appreciate recs        HEMATOLOGY  #anemia, likely d/t GI bleed   :: melena and coffee grounds via NGT noted 5/22  :: Hgb 10.3>9.4>6.9  - Patient consented to 1 unit RBC, 5/23  - Reorder CBC post-transfusion   - Continue to trend Hgb BID (goal > 7), platelets (goal > 50)     #Pancytopenia, most likely medication induced from pemfexy  #Thrombocytopenia  :: c/f TTP, blood smear pending   ::   :: s/p pemfexy 5/14  :: d dimer 1521, fibrinogen >1000- no c/f dic  :: no schistocytes on smear  ::   :: filgrastim 480 mcg (5/21-5/22)  - 5/23 ANC 4130   - haptoglobin 400   - Platelets 55>38, ctm      #Elevated INR  :: INR 4.9>6.3>2.4 >1.2  :: s/p Vitamin K 10 mg  -CTM     CARDIOVASCULAR   #History Afib, HLD, CAD, PAD  #History DVT/PE on Warfarin  :: 5/21 TTE showing:  Left ventricular systolic function is hyperdynamic with a 70-75% estimated ejection fraction, Mildly elevated RVSP, Moderately dilated aortic root   - Holding home metoprolol 25 mg twice daily  - Holding  warfarin, aspirin  - Continue home statin- holding iso npo     NEUROLOGY   # Hospital-acquired Delirium  :: waxing and waning altered mental status  - apply delirium precautions  - pulled out NGT on 5/22- now on 1:1 sitter     #Insomnia  - holding home trazodone      Antibiotics:: meropenem + micafungin   Dispo: pending PT/ OT snf     F: Replete PRN  E: Keep Mg >2, phos >3  and K >4  N: NPO  A: PIV  O2: On NC 3 L  DVT ppx: none iso thrombocytopenia  Code Status: DNR/DNI   Contact: Kirsten Arriola (Spouse)  [phone] (Work Phone)

## 2024-05-23 NOTE — SIGNIFICANT EVENT
Rapid Response RN responding for RADAR score of 9 due to the following VS: T 36.8 °Celsius;  ; RR 28; /72; SPO2 97% .      Reviewed abnormal VS with bedside RN who expressed no concerns regarding the patient's current condition based upon these.  VS within patient's current trends.  No interventions by Rapid Response team indicated at this time.

## 2024-05-23 NOTE — SIGNIFICANT EVENT
RAPID RESPONSE   05/23/24 1304   Onset Documentation   Rapid Response Initiated By RN;Physician   Pager Time 1304   Arrival Time 1306   Event End Time 1350   Primary Reason for Call FiO2 greater than or equal to 50%;Staff concern;SBP less than or equal to 90 mmHg     Rapid response called for concerns of hypotension (ongoing) , sepsis and elevated HR. Upon arrival pt resting in bed and responsive-interacting with family. SBP in the 70's- 90's.  See flow sheet for documented SBP's.  Noted melena with some streaks of red blood per fecal tube- GI aware and plan for scope in near future- advised team to contact GI now for assistance. Also pt had received 1U PRBC. Primary team at the bedside and asked to initiate a Levophed drip - started @ .02 mcg. Call to MICU fellow and will accept pt to the MICU. Pt transferred to MICU monitored and without incident.

## 2024-05-23 NOTE — HOSPITAL COURSE
Vinicius Arriola 89 y/o M w/ PMHx of PAD, afib/aflutter, hx DVT on warfarin, HTN, CAD s/p stent, mitral regurg, HLD, polycythemia vera (managed by Dr. Rothman), JOVANNI, malignant mesothelioma s/p L VATS w/ decort 5/2022 c/b post-op ileus, XRT 9-10/2022 who initially presented to  [facility] w/ abdominal pain, fever and diarrhea after premetrexed (Cycle 1) on 5/14. He had urinary retention and boyd was placed. However, pt became hypotensive which was minimally responsive for IVF causing him to go to the ICU. Bedside ultrasound showed collapsible IVC, and he was given 1.5 L LR bolus. CT CAP w/ contrast was ordered which showed known L lung collapse d/t mesothelioma, but new L posterior chest wall invasion in 9-11th rib spaces, RLL infiltration w/ pleural effusion and atelectasis, and new concern for L pulmonary trunk tumor thrombus. There was also mild diverticulitis. The patient was initially treated with vanc/zosyn/azithromycin (5/16-5/18), and switched to meropenem (5/19-5/20). Sepsis work-up at OSH had MRSa nares negative, strep/legionella antigen negative, blood cx negative, urine cx negative. He was started on maintenance fluids and admitted to  on regular nursing floor.      On arrival to , meropenem switched to vanc/zosyn (5/20-5/22). Enteric stool panel negative, C diff negative. He was also initially diuresed with IV lasix 40 x2 d/t concern for volume overload contributing to hypoxia given b/l pitting edema in lower extremities. Pt had abdominal distension and 2 episodes of emesis with dark brown foul smelling emesis. KUB showed moderate distension of the stomach w/ nonspecific fluid-filled bowel pattern. NGT was placed and pt was made NPO and all oral meds were held. He became pancytopenic, which was attributed to his chemotherapy and was given neupogen x2 until ANC >1000. Pt continued to be febrile, and he was switched to meropenem (5/22-5/25) and micafungin (5/22-5/25) CT CAP w/o contrast was ordered to  help assess infection source and noted interval improvement in RLL infiltrate and diverticulitis.      His INR was supratherapeutic, and warfarin was held (last dose 5/19). Given new tumor thrombus, anticoagulation was discussed with outpatient cardiologist, and decision was made to convert to eliquis when appropriate. Despite holding the warfarin, his INR continued to increase. His INR peaked at 6.3 and was given 10 mg of Vitamin K w/ improvement in his INR. There was noted melena, hematuria, and coffee ground drainage from the NGT concerning for bleeding. DIC labs were sent and were negative. GI was consulted d/t concern for GI bleed and he was started on PPI. His Hgb dropped ~3 points and was transfued 1 unit pRBC. As INR decreased, his hematuria and coffee ground NG output resolved, but still had persistent melena.      On 5/23, rapid response called for low BP and lethargy. Pt arousable only to sternal rub. 1 L LR was given w/ minimal improvement in mental status and pressure. He was started on peripheral levo and transferred to the MICU for further management.     On 5/23, he was weaned off levo. GI performed flex sig which only showed bleeding in the rectum. EGD was not performed as pt was too unstable. He was weaned from his airvo to NC. He was transferred to the regular nursing floor on 5/24.     Hospital course has been since complicated by asymptomatic episodes of afib w/ rvr and flutter likely 2/2 to overall deconditioning, anemia, tenuous volume status, and resolving infection ultimately abated with oral Metoprolol. Continues to have down trending anemia without evidence of bleed, GI consulted without plan for scope. Workup remarkable for ACD. Hematology consulted regarding downtrending Hgb and note that the patient's age and concurrent critical illness have likely resulted in poor BM reserve, supported by his low retic count. The patient has gradually worsening renal function with repeated workups c/f  pre-renal VIK. Nephrology was consulted and recommended slow fluid repletion and time with greatest concern for ATN. No current plan for dialysis. Dr. Ann (Primary  Oncologist) came in to see the patient and informed them that there are no further therapies that can safely be offered, particularly considering how he did not tolerate the previous therapy well. The family has met with hospice but are not currently intersected and want discharge to SNF with Hospice Navigator. Patient was medically optimized.

## 2024-05-23 NOTE — CARE PLAN
Problem: Fall/Injury  Goal: Not fall by end of shift  Outcome: Progressing  Goal: Be free from injury by end of the shift  Outcome: Progressing  Goal: Verbalize understanding of personal risk factors for fall in the hospital  Outcome: Progressing  Goal: Verbalize understanding of risk factor reduction measures to prevent injury from fall in the home  Outcome: Progressing  Goal: Use assistive devices by end of the shift  Outcome: Progressing  Goal: Pace activities to prevent fatigue by end of the shift  Outcome: Progressing     Problem: Skin  Goal: Prevent/manage excess moisture  Outcome: Progressing  Goal: Prevent/minimize sheer/friction injuries  Outcome: Progressing  Goal: Promote/optimize nutrition  Outcome: Progressing  Goal: Promote skin healing  Outcome: Progressing   The patient's goals for the shift include      The clinical goals for the shift include Pt to remain free of falls thru shift.

## 2024-05-23 NOTE — H&P
History Of Present Illness  Vinicius Arriola is a 88 y.o. male presenting with hypotension, bleeding on levo.    Vinicius Arriola is a 88 y.o. year old male patient with Past Medical History of  PAD, afib/flutter, hx DVT on warfarin, HTN, CAD s/p stent, mitral regurg, HLD, polycythemia vera, managed by Dr. Rothman ([facility]), JOVANNI, basal cell ca on face, s/p removal and malignant mesothelioma, S/p L VATS with decort 5/2022  c/b post op ileus, XRT 9-10/2022, presenting to [facility] with abdominal pain, fever and diarrhea post first Chemotherapy session on 5/16. Treated empirically for sepsis presumed due to UTI/ Pneumonia, CT CAP with concern for tumor thrombus,  presented to MICU 5/23 for hypotension (on levo) and worsening bleeding.    Per H&P, Presented to ED (5/16) after chemo on (5/14) with urinary retention, nausea vomiting and diarrhea. Concern for sepsis, patient started on vanc, zosyn, LR. On 5/17, patient was admitted to the floor at OSH, had boyd placed for urinary retention, then became hypotensive overnight 5/18. Patient was given IVF, MICU team was called, found patient to had collapsible IVC, so given another 1.5 LR bolus. Patient was on vanc azithro, antibiotics escalated to meropenem. Patient was transferred to ICU at the time. CT shows known left lung collapse due to mesothelioma, but new left posterior chest wall invasion noted in 9-11th rib spaces, right lower lobe infiltration with pleural effusion and atelectasis, and new concern for left pulmonary trunk tumor thrombus versus bland thrombus versus significantly worsened from previous CT. With some concern for mild diverticulitis with no complication. Discontinued vanc and zosyn on 5/19 d/t negative mrsa nares, legionella. Started on meropenem for concern for uti v. Pneumonia.     Patient was transferred to [facility] on 5/19 for further management. On ICU admission, patient reported fever, abdominal pain. non bloody diarrhea about 5-6 times and subsided  in the past day during the admission, no current vomiting episodes. Last dose of Warfarin on Sunday (5/19).  Due to no ICU need, patient was transferred to floor on 5/20.     During hospitalization, 5/20 warfarin held due to supratherapeutic INR 3.4. Remained febrile. Vanc zosyn restarted. On 5/21 patient began to have a distended abdomen with emesis x 2. NG tube was placed. KUB 5/21, showing moderate distention of stomach with nonspecific predominantly fluid-filled bowel pattern. CXR 5/21 showing persistent patchy right infrahilar airspace opacities.  urine cx, blood cx, c diff pcr and enteric stool panel negative. Code status changed to Dnr/dni. Course complicated by gradually increasing INR.     On 5/22, patient noted to have coffee grounds via NGT tube. Patient began having hematuria, melena, hemoptysis. Patient was given Vitamin K and given 1 uPRBCs. d dimer 1521, fibrinogen >1000- no c/f dic. Consulted GI, started pantoprazole 80mg daily. CT CAP w/o contrast: Interval improvement of the right lower lobe patchy infiltrate, Interval flattening of the inferior vena cava which may be seen with hypovolemia, Improved proximal sigmoid diverticulitis. Developing VIK, concerning for prerenal given hypovolemia, bleeding. Left lower extremity skin changes suspicious for cellulitis. Switched zosyn and vanc to meropenem and micafungin.     Rapid called on 5/22 for hypotension and tachypnea. 500 mL bolus ordered.     On 5/23, patient became hypotensive, patient given LR bolus, placed on levo. Given Transferred to MICU.    On presentation 5/23, patient noted to have hematochezia on rectal tube. CBC reordered and GI consulted. Platelets given as plt <[id number] with evidence of bleeding.    Past Medical History  Past Medical History:   Diagnosis Date    Acute embolism and thrombosis of unspecified deep veins of unspecified lower extremity (Multi)     Acute embolism and thrombosis of unspecified deep veins of unspecified lower  extremity    Dental disease     Upper and lower dentures    Encounter for preprocedural cardiovascular examination 11/02/2021    Pre-operative cardiovascular examination    Obesity, unspecified 07/15/2022    Class 2 obesity with body mass index (BMI) of 38.0 to 38.9 in adult    Personal history of diseases of the blood and blood-forming organs and certain disorders involving the immune mechanism     History of polycythemia    Personal history of other diseases of male genital organs     History of benign prostatic hyperplasia    Personal history of other diseases of the circulatory system     History of hypertension    Personal history of other diseases of the circulatory system     History of cardiac arrhythmia    Personal history of other diseases of the circulatory system 10/21/2021    History of abnormal electrocardiography    Personal history of other diseases of the circulatory system     History of essential hypertension    Personal history of other diseases of the nervous system and sense organs     History of sleep apnea    Personal history of other malignant neoplasm of skin     History of malignant neoplasm of skin    Personal history of other specified conditions     History of balance disorder    Personal history of other venous thrombosis and embolism     History of deep venous thrombosis       Surgical History  Past Surgical History:   Procedure Laterality Date    OTHER SURGICAL HISTORY  08/20/2019    Hernia repair    OTHER SURGICAL HISTORY  08/20/2019    Tonsillectomy with adenoidectomy    OTHER SURGICAL HISTORY  08/20/2019    Cataract surgery    OTHER SURGICAL HISTORY  06/10/2022    Pulmonary decortication    OTHER SURGICAL HISTORY  11/02/2021    Knee replacement    OTHER SURGICAL HISTORY  01/02/2020    Cardioversion    OTHER SURGICAL HISTORY  01/02/2020    Finger surgical procedure    OTHER SURGICAL HISTORY  05/13/2022    Cardiac catheterization with stent placement    OTHER SURGICAL HISTORY   05/26/2022    Pulmonary decortication    OTHER SURGICAL HISTORY  10/21/2021    Back surgery    OTHER SURGICAL HISTORY  11/02/2021    Colonoscopy    OTHER SURGICAL HISTORY  11/02/2021    Inguinal hernia repair    OTHER SURGICAL HISTORY  11/02/2021    Trigger finger repair    OTHER SURGICAL HISTORY  11/02/2021    Umbilical hernia repair    OTHER SURGICAL HISTORY  11/04/2021    Carpal tunnel surgery        Social History  He reports that he quit smoking about 57 years ago. His smoking use included cigarettes. He started smoking about 67 years ago. He has a 10 pack-year smoking history. He has never used smokeless tobacco. He reports current alcohol use of about 4.0 standard drinks of alcohol per week. He reports that he does not use drugs.    Family History  Family History   Problem Relation Name Age of Onset    Accidental death Mother      Coronary artery disease Mother      Other (Cardiac disorder) Father      Dementia Father      Cancer Father      Colon cancer Daughter          Allergies  Benadryl allergy decongestant, Diphenhydramine, and Diphenhydramine hcl    Review of Systems     Physical Exam  Constitutional:       Appearance: He is ill-appearing.   HENT:      Head: Normocephalic and atraumatic.   Eyes:      Extraocular Movements: Extraocular movements intact.   Cardiovascular:      Rate and Rhythm: Regular rhythm. Tachycardia present.      Pulses: Normal pulses.      Heart sounds: Normal heart sounds.   Pulmonary:      Effort: Pulmonary effort is normal.      Breath sounds: No wheezing.      Comments: Hyperresonant breath sounds on left  Abdominal:      General: Abdomen is flat. There is distension.      Palpations: Abdomen is soft.      Tenderness: There is abdominal tenderness. There is no guarding or rebound.      Comments: Generalized tenderness   Musculoskeletal:      Right lower leg: No edema.      Left lower leg: No edema.   Skin:     General: Skin is dry.      Findings: Bruising and erythema present.  "     Comments: Contusion bilateral hand and left arm. LLE erythema, outlined   Neurological:      Mental Status: He is alert.          Last Recorded Vitals  Blood pressure 90/56, pulse 107, temperature 36.5 °C (97.7 °F), temperature source Temporal, resp. rate 26, height 1.727 m (5' 7.99\"), weight 110 kg (242 lb 8.1 oz), SpO2 97%.    Relevant Results  XR chest 1 view     Result Date: 5/23/2024  Interpreted By:  Jovany Nuñez and Baker Zachary STUDY: XR CHEST 1 VIEW; ;  5/22/2024 6:45 pm   INDICATION: Signs/Symptoms:Worsening tachypnea, confusion.   COMPARISON: Chest radiograph on 05/21/2024.   ACCESSION NUMBER(S): [id number]   ORDERING CLINICIAN: SUN CHAMPAGNE   FINDINGS: Single AP view of the chest.   Right subclavian vein approach pacemaker/AICD in place with leads projecting over the right atrium and ventricle. Enteric tube coursing below diaphragm tip overlying the expected position of the gastric body.   The cardiomediastinal silhouette is obscured, similar to prior exam.   Persistent near-complete opacification of the left hemithorax, with mild interval increase in air bronchograms within the left upper lung zone. Redemonstration of patchy right infrahilar airspace opacities and interstitial prominence on the right. No right-sided pleural effusion or pneumothorax.   No acute osseous abnormality.        1. Persistent near-complete opacification of the left hemithorax, with mild interval increase in air bronchograms within the left upper lung zone. 2. Persistent patchy right infrahilar airspace opacities and interstitial prominence throughout the right lung, which may represent aspiration/pneumonia in the appropriate clinical setting. 3. Medical devices as above.   I personally reviewed the images/study and I agree with the findings as stated by Dr. Raymond Armstrong M.D. This study was interpreted at [facility], [place], [place].   MACRO: None   Signed by: Jovany Nuñze " 5/23/2024 7:32 AM Dictation workstation:   [id number]     CT chest abdomen pelvis wo IV contrast     Result Date: 5/23/2024  Interpreted By:  Sohail Torres,  and Jeff Ibrahim STUDY: CT CHEST ABDOMEN PELVIS WO CONTRAST;  5/22/2024 12:15 pm   INDICATION: Signs/Symptoms:c/f sepsis.   Per EMR: Hx of malignant meothelioma s/p L VATS / decort 5/2022, XRT 9/2022 with disease recurrence now s/p hlf dose pemetrexed admited to [facility] with sepsis. N/V/D with coffe grounds concernign for UPI GIB and ongoing fevers.   COMPARISON: CT chest/abdomen/pelvis 05/18/2024   ACCESSION NUMBER(S): [id number]   ORDERING CLINICIAN: SUN CHAMPAGNE   TECHNIQUE: CT of the chest, abdomen and pelvis was performed. Contiguous axial images were obtained at 3 mm slice thickness through the chest, abdomen and pelvis. Coronal and sagittal reconstructions at 3 mm slice thickness were performed.  No intravenous contrast was administered; positive oral contrast was given.   FINDINGS: Please note that the study is limited without intravenous contrast.   Respiratory motion artifact.   CHEST:   LUNG/PLEURA/LARGE AIRWAYS: Trachea and bilateral mainstem bronchi are patent.   Again seen complete opacification of the left hemithorax with air bronchograms consistent with significant collapse of the left lung.   There is left pleural thickening (example series 2, image 38) with a focus of left posterior chest wall extension at the level of the 9th-10th (series 2, image 71) and 10th-11th (series 2, image 82) rib spaces, similar to prior, findings consistent with patient's known mesothelioma.   Similar small loculated pleural effusion measuring 7.1 x 4.1 x 2.3 cm (series 2, image 66 and series 900, image 62).   Interval improvement of right lower lobe ill-defined patchy infiltrate (series 4, image 143). Slight interval increase in right trace pleural effusion. No new focal consolidations. No evidence of pneumothorax.   VESSELS: The previously noted  ill-defined hypodensity in the left pulmonary artery is not well evaluated on this exam due to beam hardening artifact, positioning of patient's arms, and lack of venous contrast.   Dilated main pulmonary artery measuring 3.8 cm (series 2, image 40), similar to prior. Ectatic ascending aorta measuring 4.1 cm, similar to prior.  Mild to moderate coronary artery calcifications are present.   HEART: The heart is normal in size.  Trace pericardial fluid, similar to prior. Right-sided subclavian approach dual-chambercardiac ICD/pacemaker, with the leads in the right atrium and right ventricle.   MEDIASTINUM AND ANDREA: Multiple prominent mediastinal and perihilar lymph nodes similar to prior. For example:   1.3 cm subcarinal lymph node (series 2, image 48).   1 cm perihilar lymph node (series 2, image 33).   Enteric tube courses through the esophagus and terminates in the body of the stomach. Otherwise otherwise esophagus is within normal limits.   CHEST WALL AND LOWER NECK: Right chest wall cardiac pacemaker as described above. The visualized thyroid gland appears within normal limits.   ABDOMEN:   LIVER: The liver is normal in size. Similar hypoattenuating lesions with the largest measuring 2 cm segment 4A (series 2, image 71).   BILE DUCTS: The bile ducts are not dilated.   GALLBLADDER: The gallbladder is not distended. Calcified stones are noted.   PANCREAS: The pancreas appears unremarkable.   SPLEEN: Within normal limits.   ADRENAL GLANDS: Bilateral adrenal glands appear normal.   KIDNEYS AND URETERS: Bilateral mildly atrophic kidneys with mild perinephric stranding similar to prior. There is a 0.6 cm left midpole nonobstructing renal calculi. No hydroureteronephrosis. No right nephroureterolithiasis.   PELVIS:   BLADDER: The urinary bladder is underdistended with Forrest catheter in place. There is air in the urinary bladder likely from the Forrest.   REPRODUCTIVE ORGANS: No pelvic masses.   BOWEL: NG tube body of the  stomach. Otherwise, the stomach is unremarkable. The small is normal in caliber and demonstrate no wall thickening. Proximal sigmoid diverticulosis with interval improvement in the mild wall thickening and adjacent fat stranding involving the proximal sigmoid colon (series 902, image 93). Interval insertion of rectal tube.   VESSELS: Mild atherosclerosis of the abdominal aorta and its branching vessels. There is no aneurysmal dilatation of the abdominal aorta..   Interval flattening of the inferior vena cava (image 2, image 121)/   PERITONEUM/RETROPERITONEUM/LYMPH NODES: No ascites or free air, no fluid collection.  The retroperitoneum appears unremarkable.  No enlarged mesenteric lymph nodes.   ABDOMINAL WALL: Within normal limits.   BONES: No suspicious osseous lesions are present. Degenerative discogenic disease is noted in the lower thoracic and lumbar spine most severe at L3-L4, L4-L5, and L5-L6..        Chest 1. Interval improvement of the right lower lobe patchy infiltrate. 2. Again seen left lung collapse, left pleural thickening with invasion into the left posterior chest wall, and small left loculated pleural effusion consistent with patient's known mesothelioma and is unchanged when compared to prior CT from 05/18/2024. 3. Unchanged prominent mediastinal and perihilar lymph nodes. 4. Previously noted left pulmonary artery thrombus is not well evaluated on this exam due to beam hardening artifact, positioning of patient's arms, and lack of intravenous contrast.   Abdomen-Pelvis 1. Interval flattening of the inferior vena cava which may be seen with hypovolemia. Recommend correlation with patient's fluid volume status. 2. Improved proximal sigmoid diverticulitis. 3. Additional unchanged findings as noted above.   I personally reviewed the images/study and I agree with the findings as stated by Patrice Aguilar DO, PGY-2. This study was interpreted at [facility],  [place].   MACRO: None   Signed by: Sohailkatrina Rodriguezkevin Torres 5/23/2024 12:22 AM Dictation workstation:   [id number]     Transthoracic Echo (TTE) Complete     Result Date: 5/21/2024   [facility], [address] [place] [address]                Tel [phone] and Fax [phone] TRANSTHORACIC ECHOCARDIOGRAM REPORT  Patient Name:      ZAKIYA HUMMEL LULU       Reading Physician:    [id number] Miller MD Study Date:        5/21/2024            Ordering Provider:    [id number] CHAMPAGNE MRN/PID:           [id number]             Fellow: Accession#:        [id number]         Nurse: Date of Birth/Age: 1935 / 88 years Sonographer:          Dionisio Braga RDCS Gender:            M                    Additional Staff: Height:            172.72 cm            Admit Date: Weight:            109.77 kg            Admission Status:     Inpatient -                                                               Routine BSA / BMI:         2.22 m2 / 36.80      Encounter#:           [id number]                    kg/m2                                         Department Location:  [place] [address] Blood Pressure: 96 /57 mmHg Study Type:    TRANSTHORACIC ECHO (TTE) COMPLETE Diagnosis/ICD: Unspecified atrial fibrillation-I48.91; Hypertensive heart                disease with heart failure-I11.0 Indication:    hypoxic resp failure w/ new o2 requirement c/f HF CPT Code:      Echo Complete w Full Doppler-[id number] Patient History: Pertinent History: PAD, afib/flutter, hx DVT on warfarin, HTN, CAD s/p stent,                    mitral regurg, HLD,JOVANNI, basal cell ca on face, s/p removal                    and radiation of malignant mesothelioma, S/p L VATS with                    decort 5/2022. Study Detail: The following Echo studies were  performed: 2D, M-Mode, color flow               and Doppler. Technically challenging study due to body habitus,               patient lying in supine position, poor acoustic windows and               prominent lung artifact. Definity used as a contrast agent for               endocardial border definition. Total contrast used for this               procedure was 2.0 mL via IV push. Unable to obtain suprasternal               notch view.  PHYSICIAN INTERPRETATION: Left Ventricle: The left ventricular systolic function is hyperdynamic, with an estimated ejection fraction of 70-75%. The patient is in atrial fibrillation which may influence the estimate of left ventricular function and transvalvular flows. There are no regional wall motion abnormalities. The left ventricular cavity size is normal. Left ventricular diastolic filling was not assessed. Left Atrium: The left atrium was not well visualized. Right Ventricle: The right ventricle was not well visualized. Unable to determine right ventricular systolic function. Right Atrium: The right atrium was not well visualized. Aortic Valve: The aortic valve is trileaflet. There is trivial aortic valve regurgitation. The peak instantaneous gradient of the aortic valve is 13.2 mmHg. Mitral Valve: The mitral valve is normal in structure. There is mild mitral annular calcification. There is trace mitral valve regurgitation. Tricuspid Valve: The tricuspid valve was not well visualized. Tricuspid regurgitation was not well visualized. Tricuspid regurgitation was not assessed. The Doppler estimated RVSP is mildly elevated at 36.6 mmHg. Pulmonic Valve: The pulmonic valve is not well visualized. There is physiologic pulmonic valve regurgitation. Pericardium: There is a trivial pericardial effusion. Aorta: The aortic root is abnormal. The aortic root appears moderately dilated and measures 4.50 cm. The Asc Ao is 3.70 cm. There is mild dilatation of the ascending aorta. Systemic  Veins: The inferior vena cava appears to be of normal size. There is IVC inspiratory collapse greater than 50%. In comparison to the previous echocardiogram(s): Compared with study from 5/3/2022, the previous study was a MARY during a MARY/DCCV and comparison is limited.  CONCLUSIONS:  1. Poorly visualized anatomical structures due to suboptimal image quality.  2. Left ventricular systolic function is hyperdynamic with a 70-75% estimated ejection fraction.  3. The patient is in atrial fibrillation which may influence the estimate of left ventricular function and transvalvular flows.  4. Mildly elevated RVSP.  5. Moderately dilated aortic root. QUANTITATIVE DATA SUMMARY: 2D MEASUREMENTS:                          Normal Ranges: Ao Root d:     4.50 cm   (2.0-3.7cm) LAs:           2.50 cm   (2.7-4.0cm) IVSd:          0.80 cm   (0.6-1.1cm) LVPWd:         0.80 cm   (0.6-1.1cm) LVIDd:         4.10 cm   (3.9-5.9cm) LVIDs:         2.20 cm LV Mass Index: 43.9 g/m2 LV % FS        46.3 % AORTA MEASUREMENTS:                    Normal Ranges: Asc Ao, d: 3.70 cm (2.1-3.4cm) LV SYSTOLIC FUNCTION BY 2D PLANIMETRY (MOD):                     Normal Ranges: EF-A4C View: 73.6 % (>=55%) EF-A2C View: 80.4 % EF-Biplane:  78.8 % LV DIASTOLIC FUNCTION:                     Normal Ranges: MV Peak E: 1.09 m/s (0.7-1.2 m/s) MV DT:     245 msec (150-240 msec) MITRAL VALVE:                 Normal Ranges: MV DT: 245 msec (150-240msec) AORTIC VALVE:                          Normal Ranges: AoV Vmax:      1.82 m/s  (<=1.7m/s) AoV Peak P.2 mmHg (<20mmHg) LVOT Max Philipp:  1.42 m/s  (<=1.1m/s) LVOT VTI:      18.80 cm LVOT Diameter: 2.00 cm   (1.8-2.4cm) AoV Area,Vmax: 2.45 cm2  (2.5-4.5cm2)  RIGHT VENTRICLE: TAPSE: 15.8 mm RV s'  0.18 m/s TRICUSPID VALVE/RVSP:                             Normal Ranges: Peak TR Velocity: 2.90 m/s RV Syst Pressure: 36.6 mmHg (< 30mmHg) PULMONIC VALVE:                      Normal Ranges: PV Max Philipp: 0.9 m/s  (0.6-0.9m/s)  PV Max PG:  3.2 mmHg  [id number] Delvis Miller MD Electronically signed on 5/21/2024 at 5:46:15 PM  ** Final **      XR abdomen 1 view     Result Date: 5/21/2024  Interpreted By:  Jovany Nuñez, STUDY: XR ABDOMEN 1 VIEW;  5/21/2024 12:58 pm   INDICATION: Signs/Symptoms:post ngt.   COMPARISON: One-view abdomen 05/21/2024 13 a.m.   ACCESSION NUMBER(S): [id number]   ORDERING CLINICIAN: SUN CHAMPAGNE   FINDINGS: One-view abdomen   An enteric tube is positioned within the region of the gastric fundus several cm below the expected gastroesophageal junction. There is a predominantly fluid-filled small bowel and colon pattern. There is less gastric distention with air.   Opacification of the left thorax and multiple air bronchograms are noted.        1.  The enteric tube is positioned within the gastric fundus 2. Advancement of the tube would be recommended for more optimal positioning 3. Predominantly fluid-filled small bowel and colon pattern 4. Opacification of the visualized left thorax and multiple air bronchograms within the left lung   MACRO: None   Signed by: Jovany Nuñez 5/21/2024 2:19 PM Dictation workstation:   [id number]     XR abdomen 1 view     Result Date: 5/21/2024  Interpreted By:  Jovany Nuñez, STUDY: XR ABDOMEN 1 VIEW;  5/21/2024 11:13 am   INDICATION: Signs/Symptoms:ABD DISTENTION.   COMPARISON: None.   ACCESSION NUMBER(S): [id number]   ORDERING CLINICIAN: SUN CHAMPAGNE   FINDINGS: The stomach is distended with air. There is a predominantly fluid-filled small bowel and colon pattern. Gas is demonstrated within the colon and distal ileum. The left ventricular pacemaker electrode appears in adequate position. The left ventricle appears mildly enlarged. Osseous and articular anatomy is normal for age.        1.  The stomach is moderately distended with air otherwise, nonspecific predominantly fluid-filled bowel pattern   MACRO: None   Signed by: Jovany Nuñez 5/21/2024 2:17 PM Dictation workstation:    [id number]       Assessment/Plan   Principal Problem:    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Multi)  Vinicius Arriola is a 88 y.o. year old male patient with Past Medical History of  PAD, afib/flutter, hx DVT on warfarin, HTN, CAD s/p stent, mitral regurg, HLD, polycythemia vera, managed by Dr. Rothman ([facility]), JOVANNI, basal cell ca on face, s/p removal and radiation of malignant mesothelioma, S/p L VATS with decort 5/2022  c/b post op ileus, XRT 9-10/2022, found to have a recurrence of Mesothelioma on PET scan and underwent first chemotherapy session on 5/15/2024, after which he developed fever, diarrhea, nausea an vomiting. Admitted to [facility] and treated empirically for sepsis presumed due to RLL Pneumonia iso of CT CAP showing significant collapse of the left lung worsened appearence of known Mesothelioma with concern for tumor thrombus, new chest wall invasion, right lower lobe ill-defined patchy infiltrate, as well as mild uncomplicated diverticulitis. Hospital course complicated with hypotension requiring LR and pressors, hematochezia and anemia requiring blood transfusion, prerenal VIK, cellulitis, and diverticulitis. Patient presenting with continued hematochezia, dark red blood, with GI following, pending further recs.     PULMONARY  #Pneumonia, Right lower lobe ill-defined patchy infiltrate  :: Resp Cult salivary contamination  :: MRSA nares: negative  :: Strep/Legionella antigens: negative  :: CXR 5/21 showing persistent patchy right infrahilar airspace opacities    :: 5/22 CT CAP w/o contrast: Interval improvement of the right lower lobe patchy infiltrate  :: s/p Azithro (5/16-5/18)  :: s/p vanc (5/16-5/18) (5/20-5/22) and zosyn (5/16-5/18) (5/20-22)  - meropenem (5/19-5/20) (5/22-) and micafungin (5/22-)      #Tachypnea   #elevated A-a gradient   #AHRF, improved  :: multifactorial, like d/t shunt secondary to malignancy vs pneumonia vs v/q mismatch   :: RR 23-44   :: 5/22 ABG: pH 7.53, pCO2 31, pO2 109, Lactate 2.2, FiO2  "40.  - On 3L nasal cannula with intermittent AIRVO (5/22-) to assist with work of breathing   - gradually wean supplemental O2 for SpO2 goal >90%   - continue abx regimen  - CTM      # Worsening mesothelioma  # tumor Thrombus  #Mesothelioma with Loculated Pleural Effusions   #Left Hilar Mass, Left Pulmonary Trunk Mass  #History JOVANNI, Pulmonary Embolism  :: primary oncologist Dr. Galvan  :: s/p L VATS w/ decort 5/2022, XRT 9-10/22  :: s/p \"half-dose\" pemfexy on 5/14  :: CT chest 5/18 with significant collapse of left lung, loculated pleural effusions consistent with patient's known mesothelioma.  Also shows ill-defined patchy infiltrate in right lower lobe.  Shows left hilar mass with extension into left pulmonary trunk likely tumor thrombus   - Holding warfarin, continue to monitor   - Dr. Henderson recommends transitioning to Eliquis 5mg BID, deferring anticoagulation iso thrombocytopenia  - continuing protocol w/ 1 mg folic acid daily         INFECTIOUS DISEASE   #Fever  #Sepsis most likely d/t pneumonia  :: at OSH met SIRS criteria (febrile, tachypnea)  :: UA 5/18 with small LE, 3+ bacteria, urine cx 5/21 negative  :: Resp Cult salivary contamination  :: MRSA nares: negative  :: Strep/Legionella antigens: negative  :: c diff and enteric stool panel: negative  :: BC 5/16 NGTD, repeat BC 5/20 NGTD  :: CRP 30.46>42.36  :: CXR 5/21 showing persistent patchy right infrahilar airspace opacities    :: s/p Azithro (5/16-5/18)  :: asymmetric warmth, erythema, edema noted in left lower extremity   :: s/p vanc (5/16-5/18) (5/20-5/22) and zosyn (5/16-5/18) (5/20-22)  :: 5/22 CT CAP w/o contrast: Interval improvement of the right lower lobe patchy infiltrate, Interval flattening of the inferior vena cava which may be seen with hypovolemia, Improved proximal sigmoid diverticulitis.  - repeat blood cultures drawn 5/22, NGTD   - meropenem (5/19-5/20) (5/22-) and micafungin (5/22-)   #Lower leg skin changes likely d/t cellulitis   - " improved margins of left lower extremity skin changes, will ctm        RENAL   #VIK on CKD, likely prerenal d/t fluid status   # CKD (baseline Cr 1.3-1.4)  # urine retention s/p Boyd Catheter Placement by Urology in OSH  :: US renal 5/16: Nonobstructing 1 cm left renal calculus, no hydronephrosis  :: Baseline Cr 1.3-1.4  :: Cr 1.64>2.26>3.00  - will eventually need boyd removal and trial  - strict in/out  - tamsulosin 0.4 mg daily- holding iso NPO  -minimize nephrotoxic medications as able  - 2.5 L fluids given overnight (5/23)   - boyd removed 5/23/24 due to it being placed since 5/16        GI  #coffee grounds via NGT (placed 5/21), improved   #melena likely d/t GI bleed  :: KUB 5/21, showing moderate distention of stomach with nonspecific predominantly fluid-filled bowel pattern.  :: 5/20-5/21 patient emesis that is watery, dark brown, malodorous   :: did initially present w/ diarrhea could have been chemo induced vs stool ball  :: INR 4.9>6.3>2.4 >1.2  :: s/p Vitamin K 10 mg  - holding ferrous sulfate    - ctm NGT output   - pantoprazole 40 bid   - GI consulted, appreciate recs        HEMATOLOGY  #anemia, likely d/t GI bleed   :: melena and coffee grounds via NGT noted 5/22  :: Hgb 10.3>9.4>6.9  - Patient consented to 1 unit RBC, 5/23  - Reorder CBC post-transfusion   - Continue to trend Hgb BID (goal > 7), platelets (goal > 50)     #Pancytopenia, most likely medication induced from pemfexy  #Thrombocytopenia  :: c/f TTP, blood smear pending   ::   :: s/p pemfexy 5/14  :: d dimer 1521, fibrinogen >1000- no c/f dic  :: no schistocytes on smear  ::   :: filgrastim 480 mcg (5/21-5/22)  - 5/23 ANC 4130   - haptoglobin 400   - Platelets 55>38, ctm   - Platelet transfusion given evidence of bleeding     #Elevated INR  :: INR 4.9>6.3>2.4 >1.2  :: s/p Vitamin K 10 mg  -CTM     CARDIOVASCULAR   #History Afib, HLD, CAD, PAD  #History DVT/PE on Warfarin  :: 5/21 TTE showing:  Left ventricular systolic  function is hyperdynamic with a 70-75% estimated ejection fraction, Mildly elevated RVSP, Moderately dilated aortic root   - Holding home metoprolol 25 mg twice daily  - Holding warfarin, aspirin  - Continue home statin- holding iso npo  #Hypotension  - continue levo  #Anemia  - cbc q6h     NEUROLOGY   # Hospital-acquired Delirium  :: waxing and waning altered mental status  - apply delirium precautions     #Insomnia  - holding home trazodone      Antibiotics:: meropenem + micafungin   Dispo: pending PT/ OT snf     F: Replete PRN  E: Keep Mg >2, phos >3  and K >4  N: NPO  A: PIVx2  O2: On aervo  Pressors: Levo  DVT ppx: none iso thrombocytopenia  Code Status: DNR/DNI   Contact: Kirsten Arriola (Spouse)  [phone] (Work Phone)            Cass Salcedo MD

## 2024-05-23 NOTE — PROGRESS NOTES
Vancomycin Dosing by Pharmacy- FOLLOW UP    Vinicius Arriola is a 88 y.o. year old male who Pharmacy has been consulted for vancomycin dosing for pneumonia. Based on the patient's indication and renal status this patient is being dosed based on a goal trough/random level of 15-20.     Renal function is currently declining.    Current vancomycin dose: dose by level    Most recent random level: 14.7 mcg/mL    Visit Vitals  BP 93/65 (Patient Position: Lying)   Pulse (!) 118   Temp 36.3 °C (97.3 °F)   Resp (!) 28        Lab Results   Component Value Date    CREATININE 3.00 (H) 2024    CREATININE 2.26 (H) 2024    CREATININE 1.64 (H) 2024    CREATININE 1.37 (H) 2024        Patient weight is as follows:   Vitals:    24 1845   Weight: 110 kg (242 lb 8.1 oz)       Cultures:  No results found for the encounter in last 14 days.       I/O last 3 completed shifts:  In: 2658.3 (24.2 mL/kg) [IV Piggyback:2658.3]  Out: 2550 (23.2 mL/kg) [Urine:1275 (0.3 mL/kg/hr); Emesis/NG output:1275]  Weight: 110 kg   I/O during current shift:  No intake/output data recorded.    Temp (24hrs), Av.9 °C (98.4 °F), Min:35.7 °C (96.3 °F), Max:38.8 °C (101.9 °F)      Assessment/Plan    Within goal random/trough level. Re-dose 1 gm x 1.   The next level will be obtained on  at am draw. May be obtained sooner if clinically indicated.   Will continue to monitor renal function daily while on vancomycin and order serum creatinine at least every 48 hours if not already ordered.  Follow for continued vancomycin needs, clinical response, and signs/symptoms of toxicity.       Gabi Forbes, PharmD

## 2024-05-24 ENCOUNTER — APPOINTMENT (OUTPATIENT)
Dept: GASTROENTEROLOGY | Facility: HOSPITAL | Age: 89
End: 2024-05-24
Payer: MEDICARE

## 2024-05-24 LAB
ALBUMIN SERPL BCP-MCNC: 2.1 G/DL (ref 3.4–5)
ALP SERPL-CCNC: 56 U/L (ref 33–136)
ALT SERPL W P-5'-P-CCNC: 24 U/L (ref 10–52)
ANION GAP SERPL CALC-SCNC: 19 MMOL/L (ref 10–20)
AST SERPL W P-5'-P-CCNC: 38 U/L (ref 9–39)
BACTERIA BLD CULT: NORMAL
BACTERIA BLD CULT: NORMAL
BASOPHILS # BLD AUTO: 0.04 X10*3/UL (ref 0–0.1)
BASOPHILS # BLD AUTO: 0.05 X10*3/UL (ref 0–0.1)
BASOPHILS # BLD MANUAL: 0 X10*3/UL (ref 0–0.1)
BASOPHILS # BLD MANUAL: 0 X10*3/UL (ref 0–0.1)
BASOPHILS NFR BLD AUTO: 0.4 %
BASOPHILS NFR BLD AUTO: 0.4 %
BASOPHILS NFR BLD MANUAL: 0 %
BASOPHILS NFR BLD MANUAL: 0 %
BILIRUB SERPL-MCNC: 0.7 MG/DL (ref 0–1.2)
BLOOD EXPIRATION DATE: NORMAL
BLOOD EXPIRATION DATE: NORMAL
BUN SERPL-MCNC: 69 MG/DL (ref 6–23)
BURR CELLS BLD QL SMEAR: ABNORMAL
BURR CELLS BLD QL SMEAR: NORMAL
CALCIUM SERPL-MCNC: 7.9 MG/DL (ref 8.6–10.6)
CHLORIDE SERPL-SCNC: 107 MMOL/L (ref 98–107)
CO2 SERPL-SCNC: 25 MMOL/L (ref 21–32)
CREAT SERPL-MCNC: 3.55 MG/DL (ref 0.5–1.3)
DISPENSE STATUS: NORMAL
DISPENSE STATUS: NORMAL
EGFRCR SERPLBLD CKD-EPI 2021: 16 ML/MIN/1.73M*2
EOSINOPHIL # BLD AUTO: 0.22 X10*3/UL (ref 0–0.4)
EOSINOPHIL # BLD AUTO: 0.23 X10*3/UL (ref 0–0.4)
EOSINOPHIL # BLD MANUAL: 0.09 X10*3/UL (ref 0–0.4)
EOSINOPHIL # BLD MANUAL: 0.3 X10*3/UL (ref 0–0.4)
EOSINOPHIL NFR BLD AUTO: 2 %
EOSINOPHIL NFR BLD AUTO: 2 %
EOSINOPHIL NFR BLD MANUAL: 0.8 %
EOSINOPHIL NFR BLD MANUAL: 2.6 %
ERYTHROCYTE [DISTWIDTH] IN BLOOD BY AUTOMATED COUNT: 17.7 % (ref 11.5–14.5)
ERYTHROCYTE [DISTWIDTH] IN BLOOD BY AUTOMATED COUNT: 17.8 % (ref 11.5–14.5)
ERYTHROCYTE [DISTWIDTH] IN BLOOD BY AUTOMATED COUNT: 18 % (ref 11.5–14.5)
ERYTHROCYTE [DISTWIDTH] IN BLOOD BY AUTOMATED COUNT: 18.3 % (ref 11.5–14.5)
GLUCOSE BLD MANUAL STRIP-MCNC: 83 MG/DL (ref 74–99)
GLUCOSE BLD MANUAL STRIP-MCNC: 84 MG/DL (ref 74–99)
GLUCOSE BLD MANUAL STRIP-MCNC: 86 MG/DL (ref 74–99)
GLUCOSE SERPL-MCNC: 85 MG/DL (ref 74–99)
HCT VFR BLD AUTO: 23.5 % (ref 41–52)
HCT VFR BLD AUTO: 23.7 % (ref 41–52)
HCT VFR BLD AUTO: 24.2 % (ref 41–52)
HCT VFR BLD AUTO: 24.4 % (ref 41–52)
HGB BLD-MCNC: 8 G/DL (ref 13.5–17.5)
HGB BLD-MCNC: 8 G/DL (ref 13.5–17.5)
HGB BLD-MCNC: 8.1 G/DL (ref 13.5–17.5)
HGB BLD-MCNC: 8.2 G/DL (ref 13.5–17.5)
IMM GRANULOCYTES # BLD AUTO: 0.38 X10*3/UL (ref 0–0.5)
IMM GRANULOCYTES # BLD AUTO: 0.48 X10*3/UL (ref 0–0.5)
IMM GRANULOCYTES # BLD AUTO: 0.52 X10*3/UL (ref 0–0.5)
IMM GRANULOCYTES # BLD AUTO: 0.57 X10*3/UL (ref 0–0.5)
IMM GRANULOCYTES NFR BLD AUTO: 3.3 % (ref 0–0.9)
IMM GRANULOCYTES NFR BLD AUTO: 4.3 % (ref 0–0.9)
IMM GRANULOCYTES NFR BLD AUTO: 4.5 % (ref 0–0.9)
IMM GRANULOCYTES NFR BLD AUTO: 4.9 % (ref 0–0.9)
LYMPHOCYTES # BLD AUTO: 0.46 X10*3/UL (ref 0.8–3)
LYMPHOCYTES # BLD AUTO: 0.51 X10*3/UL (ref 0.8–3)
LYMPHOCYTES # BLD MANUAL: 0 X10*3/UL (ref 0.8–3)
LYMPHOCYTES # BLD MANUAL: 0.2 X10*3/UL (ref 0.8–3)
LYMPHOCYTES NFR BLD AUTO: 4 %
LYMPHOCYTES NFR BLD AUTO: 4.5 %
LYMPHOCYTES NFR BLD MANUAL: 0 %
LYMPHOCYTES NFR BLD MANUAL: 1.7 %
MAGNESIUM SERPL-MCNC: 2.43 MG/DL (ref 1.6–2.4)
MCH RBC QN AUTO: 27.7 PG (ref 26–34)
MCH RBC QN AUTO: 27.9 PG (ref 26–34)
MCH RBC QN AUTO: 28.3 PG (ref 26–34)
MCH RBC QN AUTO: 28.5 PG (ref 26–34)
MCHC RBC AUTO-ENTMCNC: 32.8 G/DL (ref 32–36)
MCHC RBC AUTO-ENTMCNC: 33.8 G/DL (ref 32–36)
MCHC RBC AUTO-ENTMCNC: 33.9 G/DL (ref 32–36)
MCHC RBC AUTO-ENTMCNC: 34.5 G/DL (ref 32–36)
MCV RBC AUTO: 82 FL (ref 80–100)
MCV RBC AUTO: 83 FL (ref 80–100)
MCV RBC AUTO: 83 FL (ref 80–100)
MCV RBC AUTO: 86 FL (ref 80–100)
MONOCYTES # BLD AUTO: 0.3 X10*3/UL (ref 0.05–0.8)
MONOCYTES # BLD AUTO: 0.34 X10*3/UL (ref 0.05–0.8)
MONOCYTES # BLD MANUAL: 0 X10*3/UL (ref 0.05–0.8)
MONOCYTES # BLD MANUAL: 0.29 X10*3/UL (ref 0.05–0.8)
MONOCYTES NFR BLD AUTO: 2.7 %
MONOCYTES NFR BLD AUTO: 3 %
MONOCYTES NFR BLD MANUAL: 0 %
MONOCYTES NFR BLD MANUAL: 2.5 %
NEUTROPHILS # BLD AUTO: 9.71 X10*3/UL (ref 1.6–5.5)
NEUTROPHILS # BLD AUTO: 9.91 X10*3/UL (ref 1.6–5.5)
NEUTROPHILS # BLD MANUAL: 10.93 X10*3/UL (ref 1.6–5.5)
NEUTROPHILS # BLD MANUAL: 11.3 X10*3/UL (ref 1.6–5.5)
NEUTROPHILS NFR BLD AUTO: 86.1 %
NEUTROPHILS NFR BLD AUTO: 87.3 %
NEUTS BAND # BLD MANUAL: 0.6 X10*3/UL (ref 0–0.5)
NEUTS BAND # BLD MANUAL: 0.67 X10*3/UL (ref 0–0.5)
NEUTS BAND NFR BLD MANUAL: 5.2 %
NEUTS BAND NFR BLD MANUAL: 5.8 %
NEUTS SEG # BLD MANUAL: 10.26 X10*3/UL (ref 1.6–5)
NEUTS SEG # BLD MANUAL: 10.7 X10*3/UL (ref 1.6–5)
NEUTS SEG NFR BLD MANUAL: 89.2 %
NEUTS SEG NFR BLD MANUAL: 92.2 %
NRBC BLD-RTO: 0 /100 WBCS (ref 0–0)
NRBC BLD-RTO: 0 /100 WBCS (ref 0–0)
NRBC BLD-RTO: 0.2 /100 WBCS (ref 0–0)
NRBC BLD-RTO: 0.2 /100 WBCS (ref 0–0)
OVALOCYTES BLD QL SMEAR: ABNORMAL
OVALOCYTES BLD QL SMEAR: NORMAL
OVALOCYTES BLD QL SMEAR: NORMAL
PHOSPHATE SERPL-MCNC: 3.2 MG/DL (ref 2.5–4.9)
PLATELET # BLD AUTO: 57 X10*3/UL (ref 150–450)
PLATELET # BLD AUTO: 57 X10*3/UL (ref 150–450)
PLATELET # BLD AUTO: 60 X10*3/UL (ref 150–450)
PLATELET # BLD AUTO: 71 X10*3/UL (ref 150–450)
POLYCHROMASIA BLD QL SMEAR: ABNORMAL
POTASSIUM SERPL-SCNC: 3.6 MMOL/L (ref 3.5–5.3)
PRODUCT BLOOD TYPE: 6200
PRODUCT BLOOD TYPE: 6200
PRODUCT CODE: NORMAL
PRODUCT CODE: NORMAL
PROT SERPL-MCNC: 4.4 G/DL (ref 6.4–8.2)
RBC # BLD AUTO: 2.83 X10*6/UL (ref 4.5–5.9)
RBC # BLD AUTO: 2.84 X10*6/UL (ref 4.5–5.9)
RBC # BLD AUTO: 2.87 X10*6/UL (ref 4.5–5.9)
RBC # BLD AUTO: 2.96 X10*6/UL (ref 4.5–5.9)
RBC MORPH BLD: ABNORMAL
RBC MORPH BLD: ABNORMAL
RBC MORPH BLD: NORMAL
RBC MORPH BLD: NORMAL
SODIUM SERPL-SCNC: 147 MMOL/L (ref 136–145)
TOTAL CELLS COUNTED BLD: 115
TOTAL CELLS COUNTED BLD: 120
UNIT ABO: NORMAL
UNIT ABO: NORMAL
UNIT NUMBER: NORMAL
UNIT NUMBER: NORMAL
UNIT RH: NORMAL
UNIT RH: NORMAL
UNIT VOLUME: 350
UNIT VOLUME: 350
VANCOMYCIN SERPL-MCNC: 17.1 UG/ML (ref 5–20)
WBC # BLD AUTO: 11.3 X10*3/UL (ref 4.4–11.3)
WBC # BLD AUTO: 11.4 X10*3/UL (ref 4.4–11.3)
WBC # BLD AUTO: 11.5 X10*3/UL (ref 4.4–11.3)
WBC # BLD AUTO: 11.6 X10*3/UL (ref 4.4–11.3)
XM INTEP: NORMAL
XM INTEP: NORMAL

## 2024-05-24 PROCEDURE — C9113 INJ PANTOPRAZOLE SODIUM, VIA: HCPCS

## 2024-05-24 PROCEDURE — 85027 COMPLETE CBC AUTOMATED: CPT | Mod: 91

## 2024-05-24 PROCEDURE — 2500000005 HC RX 250 GENERAL PHARMACY W/O HCPCS: Performed by: STUDENT IN AN ORGANIZED HEALTH CARE EDUCATION/TRAINING PROGRAM

## 2024-05-24 PROCEDURE — 36415 COLL VENOUS BLD VENIPUNCTURE: CPT

## 2024-05-24 PROCEDURE — 2500000004 HC RX 250 GENERAL PHARMACY W/ HCPCS (ALT 636 FOR OP/ED): Performed by: STUDENT IN AN ORGANIZED HEALTH CARE EDUCATION/TRAINING PROGRAM

## 2024-05-24 PROCEDURE — 1200000003 HC ONCOLOGY  ROOM WITH TELEMETRY DAILY

## 2024-05-24 PROCEDURE — 80053 COMPREHEN METABOLIC PANEL: CPT

## 2024-05-24 PROCEDURE — 83735 ASSAY OF MAGNESIUM: CPT

## 2024-05-24 PROCEDURE — 2500000004 HC RX 250 GENERAL PHARMACY W/ HCPCS (ALT 636 FOR OP/ED)

## 2024-05-24 PROCEDURE — 2500000005 HC RX 250 GENERAL PHARMACY W/O HCPCS

## 2024-05-24 PROCEDURE — 92610 EVALUATE SWALLOWING FUNCTION: CPT | Mod: GN

## 2024-05-24 PROCEDURE — 82947 ASSAY GLUCOSE BLOOD QUANT: CPT | Mod: 91

## 2024-05-24 PROCEDURE — 80202 ASSAY OF VANCOMYCIN: CPT

## 2024-05-24 PROCEDURE — 99233 SBSQ HOSP IP/OBS HIGH 50: CPT | Performed by: STUDENT IN AN ORGANIZED HEALTH CARE EDUCATION/TRAINING PROGRAM

## 2024-05-24 PROCEDURE — 85007 BL SMEAR W/DIFF WBC COUNT: CPT | Mod: 91,MUE

## 2024-05-24 PROCEDURE — 84100 ASSAY OF PHOSPHORUS: CPT

## 2024-05-24 PROCEDURE — 99291 CRITICAL CARE FIRST HOUR: CPT

## 2024-05-24 PROCEDURE — 92526 ORAL FUNCTION THERAPY: CPT | Mod: GN

## 2024-05-24 PROCEDURE — 85025 COMPLETE CBC W/AUTO DIFF WBC: CPT

## 2024-05-24 PROCEDURE — 85025 COMPLETE CBC W/AUTO DIFF WBC: CPT | Mod: 91

## 2024-05-24 RX ORDER — VANCOMYCIN HYDROCHLORIDE 500 MG/100ML
500 INJECTION, SOLUTION INTRAVENOUS ONCE
Status: COMPLETED | OUTPATIENT
Start: 2024-05-24 | End: 2024-05-24

## 2024-05-24 RX ORDER — FENTANYL CITRATE 50 UG/ML
INJECTION, SOLUTION INTRAMUSCULAR; INTRAVENOUS
Status: DISPENSED
Start: 2024-05-24 | End: 2024-05-25

## 2024-05-24 RX ORDER — MIDAZOLAM HYDROCHLORIDE 1 MG/ML
INJECTION INTRAMUSCULAR; INTRAVENOUS
Status: DISPENSED
Start: 2024-05-24 | End: 2024-05-25

## 2024-05-24 RX ADMIN — MEROPENEM 1 G: 1 INJECTION, POWDER, FOR SOLUTION INTRAVENOUS at 16:10

## 2024-05-24 RX ADMIN — PANTOPRAZOLE SODIUM 40 MG: 40 INJECTION, POWDER, FOR SOLUTION INTRAVENOUS at 20:10

## 2024-05-24 RX ADMIN — MICAFUNGIN SODIUM 100 MG: 100 INJECTION, POWDER, LYOPHILIZED, FOR SOLUTION INTRAVENOUS at 11:50

## 2024-05-24 RX ADMIN — NYSTATIN 1 APPLICATION: 100000 POWDER TOPICAL at 08:31

## 2024-05-24 RX ADMIN — Medication 4 L/MIN: at 20:00

## 2024-05-24 RX ADMIN — VANCOMYCIN HYDROCHLORIDE 500 MG: 500 INJECTION, SOLUTION INTRAVENOUS at 08:31

## 2024-05-24 RX ADMIN — NYSTATIN 1 APPLICATION: 100000 POWDER TOPICAL at 20:10

## 2024-05-24 RX ADMIN — PANTOPRAZOLE SODIUM 40 MG: 40 INJECTION, POWDER, FOR SOLUTION INTRAVENOUS at 08:31

## 2024-05-24 RX ADMIN — MEROPENEM 1 G: 1 INJECTION, POWDER, FOR SOLUTION INTRAVENOUS at 02:45

## 2024-05-24 SDOH — ECONOMIC STABILITY: TRANSPORTATION INSECURITY: IN THE PAST 12 MONTHS, HAS LACK OF TRANSPORTATION KEPT YOU FROM MEDICAL APPOINTMENTS OR FROM GETTING MEDICATIONS?: NO

## 2024-05-24 SDOH — ECONOMIC STABILITY: FOOD INSECURITY: WITHIN THE PAST 12 MONTHS, YOU WORRIED THAT YOUR FOOD WOULD RUN OUT BEFORE YOU GOT THE MONEY TO BUY MORE.: NEVER TRUE

## 2024-05-24 SDOH — ECONOMIC STABILITY: HOUSING INSECURITY: IN THE LAST 12 MONTHS, HOW MANY PLACES HAVE YOU LIVED?: 1

## 2024-05-24 SDOH — ECONOMIC STABILITY: FOOD INSECURITY: WITHIN THE PAST 12 MONTHS, THE FOOD YOU BOUGHT JUST DIDN'T LAST AND YOU DIDN'T HAVE MONEY TO GET MORE.: NEVER TRUE

## 2024-05-24 SDOH — ECONOMIC STABILITY: INCOME INSECURITY: HOW HARD IS IT FOR YOU TO PAY FOR THE VERY BASICS LIKE FOOD, HOUSING, MEDICAL CARE, AND HEATING?: NOT VERY HARD

## 2024-05-24 SDOH — ECONOMIC STABILITY: INCOME INSECURITY: IN THE LAST 12 MONTHS, WAS THERE A TIME WHEN YOU WERE NOT ABLE TO PAY THE MORTGAGE OR RENT ON TIME?: NO

## 2024-05-24 SDOH — ECONOMIC STABILITY: INCOME INSECURITY: IN THE PAST 12 MONTHS, HAS THE ELECTRIC, GAS, OIL, OR WATER COMPANY THREATENED TO SHUT OFF SERVICE IN YOUR HOME?: NO

## 2024-05-24 SDOH — ECONOMIC STABILITY: FOOD INSECURITY: HOW HARD IS IT FOR YOU TO PAY FOR THE VERY BASICS LIKE FOOD, HOUSING, MEDICAL CARE, AND HEATING?: NOT VERY HARD

## 2024-05-24 SDOH — ECONOMIC STABILITY: INCOME INSECURITY: IN THE PAST 12 MONTHS HAS THE ELECTRIC, GAS, OIL, OR WATER COMPANY THREATENED TO SHUT OFF SERVICES IN YOUR HOME?: NO

## 2024-05-24 SDOH — ECONOMIC STABILITY: HOUSING INSECURITY: IN THE LAST 12 MONTHS, WAS THERE A TIME WHEN YOU WERE NOT ABLE TO PAY THE MORTGAGE OR RENT ON TIME?: NO

## 2024-05-24 SDOH — ECONOMIC STABILITY: FOOD INSECURITY: WITHIN THE PAST 12 MONTHS, YOU WORRIED THAT YOUR FOOD WOULD RUN OUT BEFORE YOU GOT MONEY TO BUY MORE.: NEVER TRUE

## 2024-05-24 ASSESSMENT — PAIN - FUNCTIONAL ASSESSMENT
PAIN_FUNCTIONAL_ASSESSMENT: 0-10

## 2024-05-24 ASSESSMENT — PAIN SCALES - GENERAL
PAINLEVEL_OUTOF10: 0 - NO PAIN

## 2024-05-24 ASSESSMENT — ACTIVITIES OF DAILY LIVING (ADL): LACK_OF_TRANSPORTATION: NO

## 2024-05-24 NOTE — SIGNIFICANT EVENT
ICU to [facility] Transfer Summary     I:  ICU Admission Reason & Brief ICU Course:    Vinicius Arriola is a 88 y.o. year old male patient with Past Medical History of  PAD, afib/flutter, hx DVT on warfarin, HTN, CAD s/p stent, mitral regurg, HLD, polycythemia vera, managed by Dr. Rothman ([facility] [place]), JOVANNI, basal cell ca on face, s/p removal and malignant mesothelioma, S/p L VATS with decort 5/2022  c/b post op ileus, XRT 9-10/2022, presenting to [facility] with abdominal pain, fever and diarrhea post first Chemotherapy session on 5/16. Treated empirically for sepsis presumed due to UTI/ Pneumonia, CT CAP with concern for tumor thrombus,  presented to MICU 5/23 for hypotension (on levo) and worsening bleeding.     FMS removed upon arrival to MICU. BP stable, and vasopressors able to be weaned off. Pt was transitioned from NRB to Airvo, to nasal canula. He had a flexible sigmoidoscopy with blood seen in rectum but no evidence of active bleeding.      C: Code Status/DPOA Info/Goals of Care/ACP Note    DNR and No Intubation  DPOA/Contact Number: Kirsten Arriola (Spouse)  [phone] (Work Phone)     U: Unprescribing & Pertinent High-Risk Medications    Changes to home meds:      Anticoagulation: No Reason for no VTE prophylaxis:medical contraindication due to GI bleeding    Antibiotics:   [] N/A - no current planned antimicrobioals  [x]  Vancomycin 5/16-; meropenem 5/16-; micafungin 5/21-    P: Pending Tests at the Time of Transfer   None      A: Active consultants, including Rehab:   [x]  Subspecialty Consultants: gastroenterology  [x]  PT  [x]  OT  []  SLP  []  Wound Care    U: Uncertainty Measure/Diagnostic Pause:    Working diagnosis at the time of transfer: rectal bleeding in setting of FMS, large diverticula     Diagnosis Degree of Certainty: 2. Some uncertainty about the clinical diagnosis.     S: Summary of Major Problems and To-Dos:   #GI bleed  -S/p flex sig 5/24 without evidence of active bleeding  -Continue to monitor  H/H  -Continue IV PPI  -Holding AC in setting of GI bleed and thrombocytopenia    #PNA  #Acute hypoxic respiratory failure  -On 3L NC with intermittent AIRVO (5/22-)  -Continue to wean as able       E: Exam, including Lines/Drains/Airways & Data Review:   Constitutional:       Appearance: He is ill-appearing.   HENT:      Head: Normocephalic and atraumatic.   Eyes:      Extraocular Movements: Extraocular movements intact.   Cardiovascular:      Rate and Rhythm: Regular rhythm. Tachycardia present.      Pulses: Normal pulses.      Heart sounds: Normal heart sounds.   Pulmonary:      Effort: Pulmonary effort is normal.      Breath sounds: No wheezing.      Comments: Hyperresonant breath sounds on left  Abdominal:      General: Abdomen is flat. There is distension.      Palpations: Abdomen is soft.      Tenderness: There is abdominal tenderness. There is no guarding or rebound.      Comments: Generalized tenderness   Musculoskeletal:      Right lower leg: No edema.      Left lower leg: No edema.   Skin:     General: Skin is dry.      Findings: Bruising and erythema present.      Comments: Contusion bilateral hand and left arm. LLE erythema, outlined   Neurological:      Mental Status: He is alert.   Difficult airway? No  Lines/drains assessed for removal? Yes, describe: PIVs, NG tube    Within 30 minutes of the patient physically leaving the floor, a Floor Readiness Note needs to be placed with updated vitals.

## 2024-05-24 NOTE — PROGRESS NOTES
"Vinicius Arriola is a 88 y.o. male on day 5 of admission presenting with 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Multi).    Subjective   ON, platelets were repleted. Hgb 6.9 given 1 unit pbrcs. Hgb uptrended to 8.     Today, patient does not endorse new episodes of hemoptysis, hematuria or hematochezia.        Objective     Physical Exam  Constitutional:       Appearance: He is ill-appearing.   HENT:      Head: Normocephalic and atraumatic.   Eyes:      Extraocular Movements: Extraocular movements intact.   Cardiovascular:      Rate and Rhythm: Regular rhythm. Tachycardia present.      Pulses: Normal pulses.      Heart sounds: Normal heart sounds.   Pulmonary:      Effort: Pulmonary effort is normal.      Breath sounds: No wheezing.      Comments: Hyperresonant breath sounds on left  Abdominal:      General: Abdomen is flat. There is distension.      Palpations: Abdomen is soft.      Tenderness: There is abdominal tenderness. There is no guarding or rebound.      Comments: Generalized tenderness   Musculoskeletal:      Right lower leg: No edema.      Left lower leg: No edema.   Skin:     General: Skin is dry.      Findings: Bruising and erythema present.      Comments: Contusion bilateral hand and left arm. LLE erythema, outlined   Neurological:      Mental Status: He is alert.     Last Recorded Vitals  Blood pressure 107/72, pulse (!) 118, temperature 37.1 °C (98.8 °F), temperature source Temporal, resp. rate (!) 36, height 1.727 m (5' 7.99\"), weight 115 kg (253 lb 4.9 oz), SpO2 (!) 72%.  Intake/Output last 3 Shifts:  I/O last 3 completed shifts:  In: 5020.4 (43.7 mL/kg) [I.V.:2.1 (0 mL/kg); Blood:960; IV Piggyback:4058.3]  Out: 530 (4.6 mL/kg) [Urine:375 (0.1 mL/kg/hr); Emesis/NG output:155]  Weight: 114.9 kg     Relevant Results  XR chest 1 view     Result Date: 5/23/2024  Interpreted By:  Jovany Nuñez and Baker Zachary STUDY: XR CHEST 1 VIEW; ;  5/22/2024 6:45 pm   INDICATION: Signs/Symptoms:Worsening tachypnea, confusion.   COMPARISON: " Chest radiograph on 05/21/2024.   ACCESSION NUMBER(S): [id number]   ORDERING CLINICIAN: SUN CHAMPAGNE   FINDINGS: Single AP view of the chest.   Right subclavian vein approach pacemaker/AICD in place with leads projecting over the right atrium and ventricle. Enteric tube coursing below diaphragm tip overlying the expected position of the gastric body.   The cardiomediastinal silhouette is obscured, similar to prior exam.   Persistent near-complete opacification of the left hemithorax, with mild interval increase in air bronchograms within the left upper lung zone. Redemonstration of patchy right infrahilar airspace opacities and interstitial prominence on the right. No right-sided pleural effusion or pneumothorax.   No acute osseous abnormality.        1. Persistent near-complete opacification of the left hemithorax, with mild interval increase in air bronchograms within the left upper lung zone. 2. Persistent patchy right infrahilar airspace opacities and interstitial prominence throughout the right lung, which may represent aspiration/pneumonia in the appropriate clinical setting. 3. Medical devices as above.   I personally reviewed the images/study and I agree with the findings as stated by Dr. Raymond Armstrong M.D. This study was interpreted at [place], [place].   MACRO: None   Signed by: Jovany Nuñez 5/23/2024 7:32 AM Dictation workstation:   [id number]     CT chest abdomen pelvis wo IV contrast     Result Date: 5/23/2024  Interpreted By:  Sohail Torres,  and Jeff Ibrahim STUDY: CT CHEST ABDOMEN PELVIS WO CONTRAST;  5/22/2024 12:15 pm   INDICATION: Signs/Symptoms:c/f sepsis.   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 N/V/D with coffe grounds concernign for UPI GIB and ongoing fevers.   COMPARISON: CT chest/abdomen/pelvis 05/18/2024    ACCESSION NUMBER(S): [id number]   ORDERING CLINICIAN: SUN CHAMPAGNE   TECHNIQUE: CT of the chest, abdomen and pelvis was performed. Contiguous axial images were obtained at 3 mm slice thickness through the chest, abdomen and pelvis. Coronal and sagittal reconstructions at 3 mm slice thickness were performed.  No intravenous contrast was administered; positive oral contrast was given.   FINDINGS: Please note that the study is limited without intravenous contrast.   Respiratory motion artifact.   CHEST:   LUNG/PLEURA/LARGE AIRWAYS: Trachea and bilateral mainstem bronchi are patent.   Again seen complete opacification of the left hemithorax with air bronchograms consistent with significant collapse of the left lung.   There is left pleural thickening (example series 2, image 38) with a focus of left posterior chest wall extension at the level of the 9th-10th (series 2, image 71) and 10th-11th (series 2, image 82) rib spaces, similar to prior, findings consistent with patient's known mesothelioma.   Similar small loculated pleural effusion measuring 7.1 x 4.1 x 2.3 cm (series 2, image 66 and series 900, image 62).   Interval improvement of right lower lobe ill-defined patchy infiltrate (series 4, image 143). Slight interval increase in right trace pleural effusion. No new focal consolidations. No evidence of pneumothorax.   VESSELS: The previously noted ill-defined hypodensity in the left pulmonary artery is not well evaluated on this exam due to beam hardening artifact, positioning of patient's arms, and lack of venous contrast.   Dilated main pulmonary artery measuring 3.8 cm (series 2, image 40), similar to prior. Ectatic ascending aorta measuring 4.1 cm, similar to prior.  Mild to moderate coronary artery calcifications are present.   HEART: The heart is normal in size.  Trace pericardial fluid, similar to prior. Right-sided subclavian approach dual-chambercardiac ICD/pacemaker, with the leads in the right atrium  and right ventricle.   MEDIASTINUM AND ANDREA: Multiple prominent mediastinal and perihilar lymph nodes similar to prior. For example:   1.3 cm subcarinal lymph node (series 2, image 48).   1 cm perihilar lymph node (series 2, image 33).   Enteric tube courses through the esophagus and terminates in the body of the stomach. Otherwise otherwise esophagus is within normal limits.   CHEST WALL AND LOWER NECK: Right chest wall cardiac pacemaker as described above. The visualized thyroid gland appears within normal limits.   ABDOMEN:   LIVER: The liver is normal in size. Similar hypoattenuating lesions with the largest measuring 2 cm segment 4A (series 2, image 71).   BILE DUCTS: The bile ducts are not dilated.   GALLBLADDER: The gallbladder is not distended. Calcified stones are noted.   PANCREAS: The pancreas appears unremarkable.   SPLEEN: Within normal limits.   ADRENAL GLANDS: Bilateral adrenal glands appear normal.   KIDNEYS AND URETERS: Bilateral mildly atrophic kidneys with mild perinephric stranding similar to prior. There is a 0.6 cm left midpole nonobstructing renal calculi. No hydroureteronephrosis. No right nephroureterolithiasis.   PELVIS:   BLADDER: The urinary bladder is underdistended with Forrest catheter in place. There is air in the urinary bladder likely from the Forrest.   REPRODUCTIVE ORGANS: No pelvic masses.   BOWEL: NG tube body of the stomach. Otherwise, the stomach is unremarkable. The small is normal in caliber and demonstrate no wall thickening. Proximal sigmoid diverticulosis with interval improvement in the mild wall thickening and adjacent fat stranding involving the proximal sigmoid colon (series 902, image 93). Interval insertion of rectal tube.   VESSELS: Mild atherosclerosis of the abdominal aorta and its branching vessels. There is no aneurysmal dilatation of the abdominal aorta..   Interval flattening of the inferior vena cava (image 2, image 121)/   PERITONEUM/RETROPERITONEUM/LYMPH  NODES: No ascites or free air, no fluid collection.  The retroperitoneum appears unremarkable.  No enlarged mesenteric lymph nodes.   ABDOMINAL WALL: Within normal limits.   BONES: No suspicious osseous lesions are present. Degenerative discogenic disease is noted in the lower thoracic and lumbar spine most severe at L3-L4, L4-L5, and L5-L6..        Chest 1. Interval improvement of the right lower lobe patchy infiltrate. 2. Again seen left lung collapse, left pleural thickening with invasion into the left posterior chest wall, and small left loculated pleural effusion consistent with patient's known mesothelioma and is unchanged when compared to prior CT from 05/18/2024. 3. Unchanged prominent mediastinal and perihilar lymph nodes. 4. Previously noted left pulmonary artery thrombus is not well evaluated on this exam due to beam hardening artifact, positioning of patient's arms, and lack of intravenous contrast.   Abdomen-Pelvis 1. Interval flattening of the inferior vena cava which may be seen with hypovolemia. Recommend correlation with patient's fluid volume status. 2. Improved proximal sigmoid diverticulitis. 3. Additional unchanged findings as noted above.   I personally reviewed the images/study and I agree with the findings as stated by Patrice Aguilar DO, PGY-2. This study was interpreted at [facility], [place], [place].   MACRO: None   Signed by: Sohail Torres 5/23/2024 12:22 AM Dictation workstation:   [id number]     Transthoracic Echo (TTE) Complete     Result Date: 5/21/2024   [facility], [address] [place] [address]                Tel [phone] and Fax [phone] TRANSTHORACIC ECHOCARDIOGRAM REPORT  Patient Name:      ZAKIYA Diaz Physician:    [id number] Delvis Miller MD Study Date:        5/21/2024            Ordering Provider:    Ming BURKETT                                                                IHSAN MRN/PID:           [id number]             Fellow: Accession#:        [id number]         Nurse: Date of Birth/Age: 1935 / 88 years Sonographer:          Dionisio Braga RDCS Gender:            M                    Additional Staff: Height:            172.72 cm            Admit Date: Weight:            109.77 kg            Admission Status:     Inpatient -                                                               Routine BSA / BMI:         2.22 m2 / 36.80      Encounter#:           [id number]                    kg/m2                                         Department Location:  [place] [address] Blood Pressure: 96 /57 mmHg Study Type:    TRANSTHORACIC ECHO (TTE) COMPLETE Diagnosis/ICD: Unspecified atrial fibrillation-I48.91; Hypertensive heart                disease with heart failure-I11.0 Indication:    hypoxic resp failure w/ new o2 requirement c/f HF CPT Code:      Echo Complete w Full Doppler-[id number] Patient History: Pertinent History: PAD, afib/flutter, hx DVT on warfarin, HTN, CAD s/p stent,                    mitral regurg, HLD,JOVANNI, basal cell ca on face, s/p removal                    and radiation of malignant mesothelioma, S/p L VATS with                    decort 5/2022. Study Detail: The following Echo studies were performed: 2D, M-Mode, color flow               and Doppler. Technically challenging study due to body habitus,               patient lying in supine position, poor acoustic windows and               prominent lung artifact. Definity used as a contrast agent for               endocardial border definition. Total contrast used for this               procedure was 2.0 mL via IV push. Unable to obtain suprasternal               notch view.  PHYSICIAN INTERPRETATION: Left Ventricle: The left ventricular systolic function is hyperdynamic, with an estimated ejection fraction of  70-75%. The patient is in atrial fibrillation which may influence the estimate of left ventricular function and transvalvular flows. There are no regional wall motion abnormalities. The left ventricular cavity size is normal. Left ventricular diastolic filling was not assessed. Left Atrium: The left atrium was not well visualized. Right Ventricle: The right ventricle was not well visualized. Unable to determine right ventricular systolic function. Right Atrium: The right atrium was not well visualized. Aortic Valve: The aortic valve is trileaflet. There is trivial aortic valve regurgitation. The peak instantaneous gradient of the aortic valve is 13.2 mmHg. Mitral Valve: The mitral valve is normal in structure. There is mild mitral annular calcification. There is trace mitral valve regurgitation. Tricuspid Valve: The tricuspid valve was not well visualized. Tricuspid regurgitation was not well visualized. Tricuspid regurgitation was not assessed. The Doppler estimated RVSP is mildly elevated at 36.6 mmHg. Pulmonic Valve: The pulmonic valve is not well visualized. There is physiologic pulmonic valve regurgitation. Pericardium: There is a trivial pericardial effusion. Aorta: The aortic root is abnormal. The aortic root appears moderately dilated and measures 4.50 cm. The Asc Ao is 3.70 cm. There is mild dilatation of the ascending aorta. Systemic Veins: The inferior vena cava appears to be of normal size. There is IVC inspiratory collapse greater than 50%. In comparison to the previous echocardiogram(s): Compared with study from 5/3/2022, the previous study was a MARY during a MARY/DCCV and comparison is limited.  CONCLUSIONS:  1. Poorly visualized anatomical structures due to suboptimal image quality.  2. Left ventricular systolic function is hyperdynamic with a 70-75% estimated ejection fraction.  3. The patient is in atrial fibrillation which may influence the estimate of left ventricular function and transvalvular  flows.  4. Mildly elevated RVSP.  5. Moderately dilated aortic root. QUANTITATIVE DATA SUMMARY: 2D MEASUREMENTS:                          Normal Ranges: Ao Root d:     4.50 cm   (2.0-3.7cm) LAs:           2.50 cm   (2.7-4.0cm) IVSd:          0.80 cm   (0.6-1.1cm) LVPWd:         0.80 cm   (0.6-1.1cm) LVIDd:         4.10 cm   (3.9-5.9cm) LVIDs:         2.20 cm LV Mass Index: 43.9 g/m2 LV % FS        46.3 % AORTA MEASUREMENTS:                    Normal Ranges: Asc Ao, d: 3.70 cm (2.1-3.4cm) LV SYSTOLIC FUNCTION BY 2D PLANIMETRY (MOD):                     Normal Ranges: EF-A4C View: 73.6 % (>=55%) EF-A2C View: 80.4 % EF-Biplane:  78.8 % LV DIASTOLIC FUNCTION:                     Normal Ranges: MV Peak E: 1.09 m/s (0.7-1.2 m/s) MV DT:     245 msec (150-240 msec) MITRAL VALVE:                 Normal Ranges: MV DT: 245 msec (150-240msec) AORTIC VALVE:                          Normal Ranges: AoV Vmax:      1.82 m/s  (<=1.7m/s) AoV Peak P.2 mmHg (<20mmHg) LVOT Max Philipp:  1.42 m/s  (<=1.1m/s) LVOT VTI:      18.80 cm LVOT Diameter: 2.00 cm   (1.8-2.4cm) AoV Area,Vmax: 2.45 cm2  (2.5-4.5cm2)  RIGHT VENTRICLE: TAPSE: 15.8 mm RV s'  0.18 m/s TRICUSPID VALVE/RVSP:                             Normal Ranges: Peak TR Velocity: 2.90 m/s RV Syst Pressure: 36.6 mmHg (< 30mmHg) PULMONIC VALVE:                      Normal Ranges: PV Max Philipp: 0.9 m/s  (0.6-0.9m/s) PV Max PG:  3.2 mmHg  [id number] Delvis Miller MD Electronically signed on 2024 at 5:46:15 PM  ** Final **      XR abdomen 1 view     Result Date: 2024  Interpreted By:  Jovany Nuñez, STUDY: XR ABDOMEN 1 VIEW;  2024 12:58 pm   INDICATION: Signs/Symptoms:post ngt.   COMPARISON: One-view abdomen 2024 13 a.m.   ACCESSION NUMBER(S): [id number]   ORDERING CLINICIAN: SUN CHAMPAGNE   FINDINGS: One-view abdomen   An enteric tube is positioned within the region of the gastric fundus several cm below the expected gastroesophageal junction. There is a  predominantly fluid-filled small bowel and colon pattern. There is less gastric distention with air.   Opacification of the left thorax and multiple air bronchograms are noted.        1.  The enteric tube is positioned within the gastric fundus 2. Advancement of the tube would be recommended for more optimal positioning 3. Predominantly fluid-filled small bowel and colon pattern 4. Opacification of the visualized left thorax and multiple air bronchograms within the left lung   MACRO: None   Signed by: Jovany Nuñez 5/21/2024 2:19 PM Dictation workstation:   [id number]     XR abdomen 1 view     Result Date: 5/21/2024  Interpreted By:  Jovany Nuñez, STUDY: XR ABDOMEN 1 VIEW;  5/21/2024 11:13 am   INDICATION: Signs/Symptoms:ABD DISTENTION.   COMPARISON: None.   ACCESSION NUMBER(S): [id number]   ORDERING CLINICIAN: SUN CHAMPAGNE   FINDINGS: The stomach is distended with air. There is a predominantly fluid-filled small bowel and colon pattern. Gas is demonstrated within the colon and distal ileum. The left ventricular pacemaker electrode appears in adequate position. The left ventricle appears mildly enlarged. Osseous and articular anatomy is normal for age.        1.  The stomach is moderately distended with air otherwise, nonspecific predominantly fluid-filled bowel pattern   MACRO: None   Signed by: Jovany Nuñez 5/21/2024 2:17 PM Dictation workstation:   [id number]           Assessment/Plan   Principal Problem:    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Multi)  Active Problems:    Anemia    Vinicius Arriola is a 88 y.o. year old male patient with Past Medical History of  PAD, afib/flutter, hx DVT on warfarin, HTN, CAD s/p stent, mitral regurg, HLD, polycythemia vera, managed by Dr. Rothman ([facility]), JOVANNI, basal cell ca on face, s/p removal and radiation of malignant mesothelioma, S/p L VATS with decort 5/2022  c/b post op ileus, XRT 9-10/2022, found to have a recurrence of Mesothelioma on PET scan and underwent first chemotherapy session  "on 5/15/2024, after which he developed fever, diarrhea, nausea an vomiting. Admitted to [facility] and treated empirically for sepsis presumed due to RLL Pneumonia iso of CT CAP showing significant collapse of the left lung worsened appearence of known Mesothelioma with concern for tumor thrombus, new chest wall invasion, right lower lobe ill-defined patchy infiltrate, as well as mild uncomplicated diverticulitis. Hospital course complicated with hypotension requiring LR and pressors, hematochezia and anemia requiring blood transfusion, prerenal VIK, cellulitis, and diverticulitis. Patient presenting with continued hematochezia, dark red blood, with GI following,plan for sigmoidoscopy 5/24/23.      PULMONARY  #Pneumonia, Right lower lobe ill-defined patchy infiltrate  :: Resp Cult salivary contamination  :: MRSA nares: negative  :: Strep/Legionella antigens: negative  :: CXR 5/21 showing persistent patchy right infrahilar airspace opacities    :: 5/22 CT CAP w/o contrast: Interval improvement of the right lower lobe patchy infiltrate  :: s/p Azithro (5/16-5/18)  :: s/p vanc (5/16-5/18) (5/20-5/22) and zosyn (5/16-5/18) (5/20-22)  - meropenem (5/19-5/20) (5/22-) and micafungin (5/22-)      #Tachypnea   #elevated A-a gradient   #AHRF, improved  :: multifactorial, like d/t shunt secondary to malignancy vs pneumonia vs v/q mismatch   :: RR 23-44   :: 5/22 ABG: pH 7.53, pCO2 31, pO2 109, Lactate 2.2, FiO2 40.  - On 3L nasal cannula with intermittent AIRVO (5/22-) to assist with work of breathing   - gradually wean supplemental O2 for SpO2 goal >90%   - continue abx regimen  - CTM      # Worsening mesothelioma  # tumor Thrombus  #Mesothelioma with Loculated Pleural Effusions   #Left Hilar Mass, Left Pulmonary Trunk Mass  #History JOVANNI, Pulmonary Embolism  :: primary oncologist Dr. Galvan  :: s/p L VATS w/ decort 5/2022, XRT 9-10/22  :: s/p \"half-dose\" pemfexy on 5/14  :: CT chest 5/18 with significant collapse of left lung, " loculated pleural effusions consistent with patient's known mesothelioma.  Also shows ill-defined patchy infiltrate in right lower lobe.  Shows left hilar mass with extension into left pulmonary trunk likely tumor thrombus   - Holding warfarin, continue to monitor   - Dr. Henderson recommends transitioning to Eliquis 5mg BID, deferring anticoagulation iso thrombocytopenia  - continuing protocol w/ 1 mg folic acid daily         INFECTIOUS DISEASE   #Fever  #Sepsis most likely d/t pneumonia  :: at OSH met SIRS criteria (febrile, tachypnea)  :: UA 5/18 with small LE, 3+ bacteria, urine cx 5/21 negative  :: Resp Cult salivary contamination  :: MRSA nares: negative  :: Strep/Legionella antigens: negative  :: c diff and enteric stool panel: negative  :: BC 5/16 NGTD, repeat BC 5/20 NGTD  :: CRP 30.46>42.36  :: CXR 5/21 showing persistent patchy right infrahilar airspace opacities    :: s/p Azithro (5/16-5/18)  :: asymmetric warmth, erythema, edema noted in left lower extremity   :: s/p vanc (5/16-5/18) (5/20-5/22) and zosyn (5/16-5/18) (5/20-22)  :: 5/22 CT CAP w/o contrast: Interval improvement of the right lower lobe patchy infiltrate, Interval flattening of the inferior vena cava which may be seen with hypovolemia, Improved proximal sigmoid diverticulitis.  - repeat blood cultures drawn 5/22, NGTD   - meropenem (5/19-5/20) (5/22-) and micafungin (5/22-)   #Lower leg skin changes likely d/t cellulitis   - improved margins of left lower extremity skin changes, will ctm         RENAL   #VIK on CKD, likely prerenal d/t fluid status   # CKD (baseline Cr 1.3-1.4)  # urine retention s/p Boyd Catheter Placement by Urology in OSH  :: US renal 5/16: Nonobstructing 1 cm left renal calculus, no hydronephrosis  :: Baseline Cr 1.3-1.4  :: Cr 1.64>2.26>3.00  - will eventually need boyd removal and trial  - strict in/out  - tamsulosin 0.4 mg daily- holding iso NPO  -minimize nephrotoxic medications as able  - 2.5 L fluids given  overnight (5/23)   - boyd removed 5/23/24 due to it being placed since 5/16        GI  #coffee grounds via NGT (placed 5/21), improved   #melena likely d/t GI bleed  :: KUB 5/21, showing moderate distention of stomach with nonspecific predominantly fluid-filled bowel pattern.  :: 5/20-5/21 patient emesis that is watery, dark brown, malodorous   :: did initially present w/ diarrhea could have been chemo induced vs stool ball  :: INR 4.9>6.3>2.4 >1.2  :: s/p Vitamin K 10 mg  - holding ferrous sulfate    - ctm NGT output   - pantoprazole 40 bid   - GI consulted, appreciate recs  - per GI, keep NPO, plan for sigmoidoscopy        HEMATOLOGY  #anemia, likely d/t GI bleed   :: melena and coffee grounds via NGT noted 5/22  :: Hgb 10.3>9.4>6.9  - Patient consented to 1 unit RBC, 5/23  - Reorder CBC post-transfusion   - Continue to trend Hgb BID (goal > 7), platelets (goal > 50)     #Pancytopenia, most likely medication induced from pemfexy  #Thrombocytopenia  :: c/f TTP, blood smear pending   ::   :: s/p pemfexy 5/14  :: d dimer 1521, fibrinogen >1000- no c/f dic  :: no schistocytes on smear  ::   :: filgrastim 480 mcg (5/21-5/22)  - 5/23 ANC 4130   - haptoglobin 400   - Platelets 55>38, ctm   - s/p platelet transfusion given evidence of bleeding 5/23/24     #Elevated INR  :: INR 4.9>6.3>2.4 >1.2  :: s/p Vitamin K 10 mg  -CTM     CARDIOVASCULAR   #History Afib, HLD, CAD, PAD  #History DVT/PE on Warfarin  :: 5/21 TTE showing:  Left ventricular systolic function is hyperdynamic with a 70-75% estimated ejection fraction, Mildly elevated RVSP, Moderately dilated aortic root   - Holding home metoprolol 25 mg twice daily  - Holding warfarin, aspirin  - Continue home statin- holding iso npo  #Hypotension  - continue levo  #Anemia  - cbc q6h     NEUROLOGY   # Hospital-acquired Delirium  :: waxing and waning altered mental status  - apply delirium precautions     #Insomnia  - holding home trazodone      Antibiotics::  meropenem + micafungin   Dispo: pending PT/ OT snf     F: Replete PRN  E: Keep Mg >2, phos >3  and K >4  N: NPO  A: PIVx2  O2: On aervo  Pressors: Levo  DVT ppx: none iso thrombocytopenia  Code Status: DNR/DNI   Contact: Kirsten Arriola (Spouse)  [phone] (Work Phone)        Cass Salcedo MD

## 2024-05-24 NOTE — PROGRESS NOTES
Vancomycin Dosing by Pharmacy- FOLLOW UP    Vinicius Arriola is a 88 y.o. year old male who Pharmacy has been consulted for vancomycin dosing for pneumonia. Based on the patient's indication and renal status this patient is being dosed based on a goal trough/random level of 15-20.     Renal function is currently declining.    Current vancomycin dose: dose be level    Most recent random level: 17.1 mcg/mL    Visit Vitals  /59   Pulse 110   Temp 37.1 °C (98.8 °F) (Temporal)   Resp (!) 29        Lab Results   Component Value Date    CREATININE 3.55 (H) 2024    CREATININE 3.15 (H) 2024    CREATININE 3.00 (H) 2024    CREATININE 2.26 (H) 2024        Patient weight is as follows:   Vitals:    24 0600   Weight: 115 kg (253 lb 4.9 oz)       Cultures:  No results found for the encounter in last 14 days.       I/O last 3 completed shifts:  In: 5020.4 (43.7 mL/kg) [I.V.:2.1 (0 mL/kg); Blood:960; IV Piggyback:4058.3]  Out: 530 (4.6 mL/kg) [Urine:375 (0.1 mL/kg/hr); Emesis/NG output:155]  Weight: 114.9 kg   I/O during current shift:  No intake/output data recorded.    Temp (24hrs), Av.4 °C (97.5 °F), Min:35.7 °C (96.3 °F), Max:37.1 °C (98.8 °F)      Assessment/Plan    Within goal random/trough level. Re-dose 500 mg x 1.   The next level will be obtained on  at am draw. May be obtained sooner if clinically indicated.   Will continue to monitor renal function daily while on vancomycin and order serum creatinine at least every 48 hours if not already ordered.  Follow for continued vancomycin needs, clinical response, and signs/symptoms of toxicity.       Gabi Forbes, PharmD

## 2024-05-24 NOTE — PROGRESS NOTES
Vinicius Arriola is a 88 y.o. male on day 5 of admission presenting with 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Multi).    SW met with the patient and his wife Kirsten ([phone]) at bedside. Patient resides with his wife who is his primary support. Patient is independent with completing some ADLs. Patient's PCP is Dr. Merritt at [facility]. Wife Kirsten will provide transportation upon discharge.       DIONNE MORALES

## 2024-05-24 NOTE — PROGRESS NOTES
Physical Therapy                 Therapy Communication Note    Patient Name: Vinicius Arriola  MRN: [id number]  Today's Date: 5/24/2024     Discipline: Physical Therapy    Missed Visit Reason: Missed Visit Reason:  (patient with active GIB; plan for scope this date per RN; PT will hold.)    Missed Time: Attempt    Comment:

## 2024-05-24 NOTE — CARE PLAN
The patient's goals for the shift include      The clinical goals for the shift include patient will remain safe and free from injury throughout shift at 1900      Problem: Fall/Injury  Goal: Not fall by end of shift  Outcome: Progressing  Goal: Be free from injury by end of the shift  Outcome: Progressing  Goal: Verbalize understanding of personal risk factors for fall in the hospital  Outcome: Progressing  Goal: Verbalize understanding of risk factor reduction measures to prevent injury from fall in the home  Outcome: Progressing  Goal: Use assistive devices by end of the shift  Outcome: Progressing  Goal: Pace activities to prevent fatigue by end of the shift  Outcome: Progressing     Problem: Skin  Goal: Prevent/manage excess moisture  Outcome: Progressing  Goal: Prevent/minimize sheer/friction injuries  Outcome: Progressing  Goal: Promote/optimize nutrition  Outcome: Progressing  Goal: Promote skin healing  Outcome: Progressing

## 2024-05-24 NOTE — PROGRESS NOTES
"Speech-Language Pathology    SLP Adult Inpatient Speech-Language Pathology Clinical Swallow Evaluation    Patient Name: Vinicius Arriola  MRN: [id number]  Today's Date: 5/24/2024   Time Calculation  Start Time: 1400  Stop Time: 1430  Time Calculation (min): 30 min       Assessment:  Suspected pharyngeal dysphagia     Recommendations:  NPO    Frequent, aggressive oral care as tolerated to improve infection control    OK for small amounts of ice chips and sips of water (one at a time, 10x/hour) for oral comfort and to prevent swallow disuse atrophy, but only after aggressive oral care to avoid colonization of bacteria within the oral cavity.     Recommend Modified Barium Swallow Study for further assessment of oropharyngeal swallow and to guide diet recommendations.  Goal to complete tomorrow AM.      Plan:  SLP Plan: Skilled SLP  SLP Frequency: 2x per week  Duration:  4 weeks  Discussed POC: Pt, family, RN, physicians      Goals:  Pt will participate in a Modified Barium Swallow Study for further assessment of oropharyngeal swallow and to guide diet recommendations.     Date initiated: 5/24/24   Status: Goal initiated       Pt/family will indicate understanding of dysphagia education: risk for aspiration, recommendations, and POC with > 80% accuracy via teach back method.   Date initiated: 5/24/24   Status: Goal initiated            Subjective   RN cleared pt for evaluation.  Pt properly identified and evaluated bedside.  Awake and alert, with no c/o pain.  Room air.  NGT in place.  Family present x3.    Per documentation, \"Vinicius Arriola is a 88 y.o. year old male patient with Past Medical History of  PAD, afib/flutter, hx DVT on warfarin, HTN, CAD s/p stent, mitral regurg, HLD, polycythemia vera, managed by Dr. Rothman ([facility]), JOVANNI, basal cell ca on face, s/p removal and radiation of malignant mesothelioma, S/p L VATS with decort 5/2022  c/b post op ileus, XRT 9-10/2022, found to have a recurrence of Mesothelioma on " "PET scan and underwent first chemotherapy session on 5/15/2024, after which he developed fever, diarrhea, nausea an vomiting. Admitted to [facility] and treated empirically for sepsis presumed due to RLL Pneumonia iso of CT CAP showing significant collapse of the left lung worsened appearence of known Mesothelioma with concern for tumor thrombus, new chest wall invasion, right lower lobe ill-defined patchy infiltrate, as well as mild uncomplicated diverticulitis. Hospital course complicated with hypotension requiring LR and pressors, hematochezia and anemia requiring blood transfusion, prerenal VIK, cellulitis, and diverticulitis. Patient presenting with continued hematochezia, dark red blood, with GI following,plan for sigmoidoscopy 5/24/23\".  GI cleared for PO trials per RN.         Objective     Cognition:  Command Following: WFL  Attention: WFL      Oral/Motor Assessment:  Oral Hygiene: WFL  Dentition: Upper and lower dentures   Oral Motor: WFL  Voice (Perceptually): WFL  Volitional Cough (Perceptually): WFL  Volitional Swallow: WFL           Consistencies Trialed:  Ice chips, sips of water.  Not appropriate for additional trials given apparent severity of swallowing deficits.         Clinical Observations:  Patient Positioning: Upright in Bed  Management of Oral Secretions: WFL  Was The 3 oz Swallow Protocol Completed: Yes      Clinical bedside swallow evaluation completed.  Pt presented with small, single ice chips x4.  Adequate bolus containment and manipulation.  No s/s of aspiration.  Pt also participated in trials of single sips of thin liquid via straw x4.  Functional oral phase and no s/s of aspiration.  Pt then completed the 3 oz Swallow Protocol x2. Consumed all 3 oz first trial, but cough following.  Room temp water used on second trial.  Pt consumed ~ 2 oz before stopping.  Question difficulty coordinating breathing/swallow vs silent aspiration.  Recommend MBSS for further assessment.      w/ s/s of " aspiration (cough).  PO trials ceased due to concern for pharyngeal dysphagia/aspiration.      Dysphagia Therapy:  Education provided to patient and family regarding NPO recommendations, allowance of ice chips/sips of water, importance of oral care, and overall dysphagia plan of care, including MBSS.  Pt agreeable to completing MBSS; goal to complete tomorrow AM as cleared by physicians and pending radiology's schedule.  Understanding of education indicated via teach back method with > 80% accuracy.  Discussed pt status w/ RN.       05/24/24 at 3:46 PM - Meri Hurley, SLP

## 2024-05-24 NOTE — PROGRESS NOTES
Gastroenterology Consult Service Progress Note  Department of Gastroenterology & Hepatology  [facility] [place]    [facility]  May 24, 2024   Patient: Vinicius Arriola    Medical Record: [id number]  Reason for Consult: coffee grounds per NG, melena  Requesting Service: Destiny/ oncology     Interval History: Yesterday afternoon, became unresponsive and BP dropped. Transferred to MICU on Levo. Hb trend: 10.3 5/21 --> 9.3 (5/22 AM) -> 8.4 (5/22 PM) --> 6.9 overnight 5/22 -->7.3 5/23 AM -->  6.9 5/23 PM --> 1 U --> 8.0 at MN last night --> 8.1 this AM. Plts down to 32 yesterday afternoon --> 1 U plts --> 70 --> 60 this AM. No recorded fevers in last 24 hours since 5/23 AM. Was taken off HFNC yesterday in MICU, now tachypnic again. rectal tube still in place, still w/ dark red output. NGT with green bile.     Physical Exam:    Vitals:    05/24/24 0300 05/24/24 0400 05/24/24 0500 05/24/24 0600   BP: 114/67 98/62 92/52 92/67   Pulse: 110 102 98 110   Resp: 16 (!) 28 (!) 28 19   Temp:       TempSrc:       SpO2: 92% 96% 95% 93%   Weight:    115 kg (253 lb 4.9 oz)   Height:             Intake/Output Summary (Last 24 hours) at 5/24/2024 0636  Last data filed at 5/24/2024 0600  Gross per 24 hour   Intake 2362.07 ml   Output 155 ml   Net 2207.07 ml     Gen: chronically ill appearing, A+Ox3, uncomfortable appearing  HEENT: PEERL, EOMI, dry MM; NG tube with green bilious fluid  Resp: tachypnic, on 4L NC  CV: +tachy   GI: non-tender, non-distended, normal Bss  MSK: warm and well perfused  Neuro: A+Ox3, no focal deficits  Skin: warm and dry, no lesions, no rashes      Rectal: rectal tube in place with dark stool with scant blood streaks    Labs:  Lab Results   Component Value Date    GLUCOSE 85 05/24/2024    CALCIUM 7.9 (L) 05/24/2024     (H) 05/24/2024    K 3.6 05/24/2024    CO2 25 05/24/2024     05/24/2024    BUN 69 (H) 05/24/2024    CREATININE 3.55 (H) 05/24/2024     Lab Results    Component Value Date    WBC 11.5 (H) 05/24/2024    HGB 8.1 (L) 05/24/2024    HCT 23.5 (L) 05/24/2024    MCV 83 05/24/2024    PLT 60 (L) 05/24/2024     Lab Results   Component Value Date    INR 1.2 (H) 05/23/2024    INR 1.2 (H) 05/23/2024    INR 2.4 (H) 05/22/2024    PROTIME 13.4 (H) 05/23/2024    PROTIME 13.6 (H) 05/23/2024    PROTIME 27.6 (H) 05/22/2024     Lab Results   Component Value Date    ALT 24 05/24/2024    AST 38 05/24/2024    ALKPHOS 56 05/24/2024    BILITOT 0.7 05/24/2024     Imaging:    CT C/A/P without contrast 5/22:  IMPRESSION:  Chest  1. Interval improvement of the right lower lobe patchy infiltrate.  2. Again seen left lung collapse, left pleural thickening with  invasion into the left posterior chest wall, and small left loculated  pleural effusion consistent with patient's known mesothelioma and is  unchanged when compared to prior CT from 05/18/2024.  3. Unchanged prominent mediastinal and perihilar lymph nodes.  4. Previously noted left pulmonary artery thrombus is not well  evaluated on this exam due to beam hardening artifact, positioning of  patient's arms, and lack of intravenous contrast.    AXR 5/21:  IMPRESSION:  1.  The enteric tube is positioned within the gastric fundus  2. Advancement of the tube would be recommended for more optimal  positioning  3. Predominantly fluid-filled small bowel and colon pattern  4. Opacification of the visualized left thorax and multiple air  bronchograms within the left lung     AXR 5/21  IMPRESSION:  1.  The stomach is moderately distended with air otherwise,  nonspecific predominantly fluid-filled bowel pattern     CT Chest/ abd/pelvis 5/18:  IMPRESSION:  CHEST:  1. Significant collapse of the left lung with heterogenous  appearance, pleural thickening and small loculated pleural fluid  measuring 7.8 cm consistent with the patient's known mesothelioma  which have worsened from the prior CT scan dated 06/14/2023 and  grossly unchanged from 12/10/2023  unenhanced CT scan allowing for  differences in techniques. Focus of new left posterior chest wall  invasion noted at the level of 9th 10th and 10th 11th rib spaces.  2. Right lower lobe ill-defined patchy infiltrate measuring 2.3 x 1.4  cm. Trace right-sided pleural effusion with surrounding atelectasis.  3. Redemonstrated left hilar ill-defined enhancing fullness appears  extending to the left pulmonary trunk likely tumor thrombus and felt  less likely bland thrombus which has significantly worsened from  06/14/2023 CT scan.  4. Mildly enlarged multiple mediastinal lymph nodes in the largest in  the subcarinal region measuring 1.6 cm, grossly unchanged from prior.      ABDOMEN-PELVIS:  1. Proximal sigmoid diverticulosis with mild wall thickening could be  related to episodes of underlying mild uncomplicated diverticulitis  2. Other ancillary findings are unchanged.     CT Abd/pelvis 5/17:  IMPRESSION:  1.  Subtle inflammatory change along the proximal sigmoid colon which  may represent a low-grade acute diverticulitis.  No definite  perforation or abscess.  2.  Complete collapse and consolidation of the left lung with a  loculated effusion at the left lung base, unchanged compared to the  prior chest CT and likely consistent with patient's reported  mesothelioma, possibly chronic post treatment change.  3.  Cardiomegaly with chronic changes suggesting COPD.  4.  Cholelithiasis.  5.  Moderate bilateral renal atrophy with nonobstructing 6 mm left  renal calculus.  6.  Additional chronic changes as described.     GI procedures:  No prior EGD  Reports last colonoscopy ~ 4 years ago    Flex sig 5/24:  Impression  Blood present in the rectum  Scattered diverticulosis in the sigmoid colon  The sigmoid colon, rectosigmoid and rectum appeared normal.  Green bilious stool coming more proximally, without any blood, blood clots or hematin.  Likely bleeding from rectum or sigmoid, no evidence of active bleeding at this  time    Assessment and Plan:        Vinicius Arriola is a 88 y.o. male with AF+ DVT (on coumadin), HTN, CAD, MR, polycythemia vera (though cannot find evidence of JAK2 mutation), malignant mesothelioma s/p L VATS w/ decort 5/2022, XRT 9/2022 with disease recurrence now s/p  half dose pemetrexed admitted to [facility] with sepsis. Gastroenterology is consulted for concern for GIB.      Patient does have worsening coagulopathy and thrombocytopenia. Likely in the setting of ongoing fevers/ sepsis. Ddx: mucosal oozing in the setting of coagulopathy, vascular lesion, gastritis/ esophagitis/duodenitis, PUD, etc.. Reassuringly today without any worsening hemodynamics, and NGT without active bleeding or coffee grounds,  though patient is now on HFNC for resp support. Improveent in coagulopathy though worsening thrombocytopenia.     Pt underwent flex sig today with evidence of blood in rectum, though no source or active site was seen. More proximal to rectum there was brown stool and bile, suggesting source is not upper. Will continue to closely monitor.      #coffee grounds via NGT  #melena  - keep NPO for today, if Hb remains stable can consider resuming diet tomorrow   - Continue to trend Hgb BID (goal > 7), platelets (goal > 50)  - Ensure adequate IV access, ideally 2 large bore Ivs  - Would generally avoid administration of blood products to correct coagulopathy unless active hemorrhage  - would hold PO iron for now, can given IV iron while inpatient if needed   - sepsis treatment/ abx as per primary   - continue IV PPI BID     Patient seen and discussed with attending, Dr. Valenzuela.      Ale Archibald, GI fellow    Thank you for the consultation. Gastroenterology will continue to the follow the patient.   Please do not hesitate to contact me on DocHalo or page [phone] if there are any further questions between the weekday hours of 7 AM - 5 PM.   If there is an urgent concern during the weekend, after-hours, or holidays; then please  page the on-call GI fellow at [phone]. Thank you.    SIGNATURE: Ale Archibald MD PATIENT NAME: Vinicius Arriola   DATE: May 24, 2024 MRN: [id number]

## 2024-05-25 ENCOUNTER — APPOINTMENT (OUTPATIENT)
Dept: RADIOLOGY | Facility: HOSPITAL | Age: 89
DRG: 871 | End: 2024-05-25
Payer: MEDICARE

## 2024-05-25 ENCOUNTER — APPOINTMENT (OUTPATIENT)
Dept: CARDIOLOGY | Facility: HOSPITAL | Age: 89
End: 2024-05-25
Payer: MEDICARE

## 2024-05-25 ENCOUNTER — APPOINTMENT (OUTPATIENT)
Dept: RADIOLOGY | Facility: HOSPITAL | Age: 89
End: 2024-05-25
Payer: MEDICARE

## 2024-05-25 LAB
ALBUMIN SERPL BCP-MCNC: 2.4 G/DL (ref 3.4–5)
ALP SERPL-CCNC: 67 U/L (ref 33–136)
ALT SERPL W P-5'-P-CCNC: 18 U/L (ref 10–52)
ANION GAP SERPL CALC-SCNC: 18 MMOL/L (ref 10–20)
AST SERPL W P-5'-P-CCNC: 33 U/L (ref 9–39)
BASOPHILS # BLD MANUAL: 0 X10*3/UL (ref 0–0.1)
BASOPHILS NFR BLD MANUAL: 0 %
BILIRUB SERPL-MCNC: 0.6 MG/DL (ref 0–1.2)
BUN SERPL-MCNC: 73 MG/DL (ref 6–23)
CALCIUM SERPL-MCNC: 8.3 MG/DL (ref 8.6–10.6)
CHLORIDE SERPL-SCNC: 108 MMOL/L (ref 98–107)
CHLORIDE UR-SCNC: 15 MMOL/L
CHLORIDE/CREATININE (MMOL/G) IN URINE: 14 MMOL/G CREAT (ref 23–275)
CO2 SERPL-SCNC: 25 MMOL/L (ref 21–32)
CREAT SERPL-MCNC: 3.62 MG/DL (ref 0.5–1.3)
CREAT UR-MCNC: 108 MG/DL (ref 20–370)
CREAT UR-MCNC: 108.2 MG/DL (ref 20–370)
EGFRCR SERPLBLD CKD-EPI 2021: 16 ML/MIN/1.73M*2
EOSINOPHIL # BLD MANUAL: 0.17 X10*3/UL (ref 0–0.4)
EOSINOPHIL NFR BLD MANUAL: 1.8 %
ERYTHROCYTE [DISTWIDTH] IN BLOOD BY AUTOMATED COUNT: 18.4 % (ref 11.5–14.5)
GLUCOSE BLD MANUAL STRIP-MCNC: 74 MG/DL (ref 74–99)
GLUCOSE BLD MANUAL STRIP-MCNC: 83 MG/DL (ref 74–99)
GLUCOSE SERPL-MCNC: 80 MG/DL (ref 74–99)
HCT VFR BLD AUTO: 25.9 % (ref 41–52)
HGB BLD-MCNC: 8.2 G/DL (ref 13.5–17.5)
IMM GRANULOCYTES # BLD AUTO: 0.37 X10*3/UL (ref 0–0.5)
IMM GRANULOCYTES NFR BLD AUTO: 3.9 % (ref 0–0.9)
LYMPHOCYTES # BLD MANUAL: 0.16 X10*3/UL (ref 0.8–3)
LYMPHOCYTES NFR BLD MANUAL: 1.7 %
MAGNESIUM SERPL-MCNC: 2.54 MG/DL (ref 1.6–2.4)
MCH RBC QN AUTO: 28.4 PG (ref 26–34)
MCHC RBC AUTO-ENTMCNC: 31.7 G/DL (ref 32–36)
MCV RBC AUTO: 90 FL (ref 80–100)
MONOCYTES # BLD MANUAL: 0.16 X10*3/UL (ref 0.05–0.8)
MONOCYTES NFR BLD MANUAL: 1.7 %
NEUTROPHILS # BLD MANUAL: 9.1 X10*3/UL (ref 1.6–5.5)
NEUTS BAND # BLD MANUAL: 0.25 X10*3/UL (ref 0–0.5)
NEUTS BAND NFR BLD MANUAL: 2.6 %
NEUTS SEG # BLD MANUAL: 8.85 X10*3/UL (ref 1.6–5)
NEUTS SEG NFR BLD MANUAL: 92.2 %
NRBC BLD-RTO: 0.2 /100 WBCS (ref 0–0)
PHOSPHATE SERPL-MCNC: 3.3 MG/DL (ref 2.5–4.9)
PLATELET # BLD AUTO: 51 X10*3/UL (ref 150–450)
POTASSIUM SERPL-SCNC: 3.5 MMOL/L (ref 3.5–5.3)
POTASSIUM UR-SCNC: 23 MMOL/L
POTASSIUM/CREAT UR-RTO: 21 MMOL/G CREAT
PROT SERPL-MCNC: 5.1 G/DL (ref 6.4–8.2)
RBC # BLD AUTO: 2.89 X10*6/UL (ref 4.5–5.9)
RBC MORPH BLD: ABNORMAL
SODIUM SERPL-SCNC: 147 MMOL/L (ref 136–145)
SODIUM UR-SCNC: 33 MMOL/L
SODIUM/CREAT UR-RTO: 31 MMOL/G CREAT
TOTAL CELLS COUNTED BLD: 115
UREA/CREAT UR-SRTO: 7.2 G/G CREAT
UUN UR-MCNC: 775 MG/DL
VANCOMYCIN SERPL-MCNC: 18.2 UG/ML (ref 5–20)
WBC # BLD AUTO: 9.6 X10*3/UL (ref 4.4–11.3)

## 2024-05-25 PROCEDURE — 99233 SBSQ HOSP IP/OBS HIGH 50: CPT

## 2024-05-25 PROCEDURE — 99232 SBSQ HOSP IP/OBS MODERATE 35: CPT | Performed by: STUDENT IN AN ORGANIZED HEALTH CARE EDUCATION/TRAINING PROGRAM

## 2024-05-25 PROCEDURE — 83735 ASSAY OF MAGNESIUM: CPT | Performed by: STUDENT IN AN ORGANIZED HEALTH CARE EDUCATION/TRAINING PROGRAM

## 2024-05-25 PROCEDURE — 1200000003 HC ONCOLOGY  ROOM WITH TELEMETRY DAILY

## 2024-05-25 PROCEDURE — 85027 COMPLETE CBC AUTOMATED: CPT | Performed by: STUDENT IN AN ORGANIZED HEALTH CARE EDUCATION/TRAINING PROGRAM

## 2024-05-25 PROCEDURE — C9113 INJ PANTOPRAZOLE SODIUM, VIA: HCPCS | Performed by: STUDENT IN AN ORGANIZED HEALTH CARE EDUCATION/TRAINING PROGRAM

## 2024-05-25 PROCEDURE — 36415 COLL VENOUS BLD VENIPUNCTURE: CPT | Performed by: STUDENT IN AN ORGANIZED HEALTH CARE EDUCATION/TRAINING PROGRAM

## 2024-05-25 PROCEDURE — 2500000004 HC RX 250 GENERAL PHARMACY W/ HCPCS (ALT 636 FOR OP/ED): Performed by: STUDENT IN AN ORGANIZED HEALTH CARE EDUCATION/TRAINING PROGRAM

## 2024-05-25 PROCEDURE — 82436 ASSAY OF URINE CHLORIDE: CPT

## 2024-05-25 PROCEDURE — 82570 ASSAY OF URINE CREATININE: CPT | Mod: 91

## 2024-05-25 PROCEDURE — 84100 ASSAY OF PHOSPHORUS: CPT | Performed by: STUDENT IN AN ORGANIZED HEALTH CARE EDUCATION/TRAINING PROGRAM

## 2024-05-25 PROCEDURE — 85007 BL SMEAR W/DIFF WBC COUNT: CPT | Performed by: STUDENT IN AN ORGANIZED HEALTH CARE EDUCATION/TRAINING PROGRAM

## 2024-05-25 PROCEDURE — 74018 RADEX ABDOMEN 1 VIEW: CPT

## 2024-05-25 PROCEDURE — 2500000001 HC RX 250 WO HCPCS SELF ADMINISTERED DRUGS (ALT 637 FOR MEDICARE OP): Performed by: STUDENT IN AN ORGANIZED HEALTH CARE EDUCATION/TRAINING PROGRAM

## 2024-05-25 PROCEDURE — 2500000004 HC RX 250 GENERAL PHARMACY W/ HCPCS (ALT 636 FOR OP/ED)

## 2024-05-25 PROCEDURE — 82947 ASSAY GLUCOSE BLOOD QUANT: CPT | Mod: 91

## 2024-05-25 PROCEDURE — 2500000005 HC RX 250 GENERAL PHARMACY W/O HCPCS: Performed by: HOSPITALIST

## 2024-05-25 PROCEDURE — 2500000005 HC RX 250 GENERAL PHARMACY W/O HCPCS

## 2024-05-25 PROCEDURE — 74230 X-RAY XM SWLNG FUNCJ C+: CPT | Performed by: RADIOLOGY

## 2024-05-25 PROCEDURE — 93010 ELECTROCARDIOGRAM REPORT: CPT | Performed by: INTERNAL MEDICINE

## 2024-05-25 PROCEDURE — 92611 MOTION FLUOROSCOPY/SWALLOW: CPT | Mod: GN

## 2024-05-25 PROCEDURE — 80202 ASSAY OF VANCOMYCIN: CPT | Performed by: STUDENT IN AN ORGANIZED HEALTH CARE EDUCATION/TRAINING PROGRAM

## 2024-05-25 PROCEDURE — 74230 X-RAY XM SWLNG FUNCJ C+: CPT

## 2024-05-25 PROCEDURE — 2500000005 HC RX 250 GENERAL PHARMACY W/O HCPCS: Performed by: STUDENT IN AN ORGANIZED HEALTH CARE EDUCATION/TRAINING PROGRAM

## 2024-05-25 PROCEDURE — 92526 ORAL FUNCTION THERAPY: CPT | Mod: GN

## 2024-05-25 PROCEDURE — 93005 ELECTROCARDIOGRAM TRACING: CPT

## 2024-05-25 PROCEDURE — 80053 COMPREHEN METABOLIC PANEL: CPT | Performed by: STUDENT IN AN ORGANIZED HEALTH CARE EDUCATION/TRAINING PROGRAM

## 2024-05-25 PROCEDURE — 74018 RADEX ABDOMEN 1 VIEW: CPT | Performed by: RADIOLOGY

## 2024-05-25 RX ORDER — METOPROLOL TARTRATE 1 MG/ML
5 INJECTION, SOLUTION INTRAVENOUS ONCE
Status: COMPLETED | OUTPATIENT
Start: 2024-05-25 | End: 2024-05-25

## 2024-05-25 RX ORDER — POTASSIUM CHLORIDE 14.9 MG/ML
20 INJECTION INTRAVENOUS ONCE
Status: COMPLETED | OUTPATIENT
Start: 2024-05-25 | End: 2024-05-25

## 2024-05-25 RX ORDER — METOPROLOL TARTRATE 1 MG/ML
5 INJECTION, SOLUTION INTRAVENOUS ONCE
Status: DISCONTINUED | OUTPATIENT
Start: 2024-05-25 | End: 2024-05-25

## 2024-05-25 RX ORDER — VANCOMYCIN HYDROCHLORIDE 500 MG/100ML
500 INJECTION, SOLUTION INTRAVENOUS ONCE
Status: DISCONTINUED | OUTPATIENT
Start: 2024-05-26 | End: 2024-05-25

## 2024-05-25 RX ORDER — METOPROLOL TARTRATE 1 MG/ML
INJECTION, SOLUTION INTRAVENOUS
Status: COMPLETED
Start: 2024-05-25 | End: 2024-05-25

## 2024-05-25 RX ADMIN — METOPROLOL TARTRATE 5 MG: 1 INJECTION, SOLUTION INTRAVENOUS at 17:51

## 2024-05-25 RX ADMIN — BARIUM SULFATE 30 ML: 400 SUSPENSION ORAL at 09:08

## 2024-05-25 RX ADMIN — Medication 2 L/MIN: at 08:00

## 2024-05-25 RX ADMIN — POTASSIUM CHLORIDE 20 MEQ: 14.9 INJECTION, SOLUTION INTRAVENOUS at 18:19

## 2024-05-25 RX ADMIN — BARIUM SULFATE 60 ML: 400 SUSPENSION ORAL at 09:09

## 2024-05-25 RX ADMIN — PANTOPRAZOLE SODIUM 40 MG: 40 INJECTION, POWDER, FOR SOLUTION INTRAVENOUS at 10:05

## 2024-05-25 RX ADMIN — NYSTATIN 1 APPLICATION: 100000 POWDER TOPICAL at 10:11

## 2024-05-25 RX ADMIN — BARIUM SULFATE 40 ML: 400 PASTE ORAL at 09:08

## 2024-05-25 RX ADMIN — NYSTATIN 1 APPLICATION: 100000 POWDER TOPICAL at 21:04

## 2024-05-25 RX ADMIN — PANTOPRAZOLE SODIUM 40 MG: 40 INJECTION, POWDER, FOR SOLUTION INTRAVENOUS at 21:04

## 2024-05-25 RX ADMIN — Medication 2 L/MIN: at 20:00

## 2024-05-25 RX ADMIN — MEROPENEM 1 G: 1 INJECTION, POWDER, FOR SOLUTION INTRAVENOUS at 03:03

## 2024-05-25 ASSESSMENT — COGNITIVE AND FUNCTIONAL STATUS - GENERAL
STANDING UP FROM CHAIR USING ARMS: A LOT
DRESSING REGULAR LOWER BODY CLOTHING: TOTAL
MOVING FROM LYING ON BACK TO SITTING ON SIDE OF FLAT BED WITH BEDRAILS: A LOT
MOVING TO AND FROM BED TO CHAIR: A LOT
DAILY ACTIVITIY SCORE: 9
CLIMB 3 TO 5 STEPS WITH RAILING: TOTAL
WALKING IN HOSPITAL ROOM: A LOT
EATING MEALS: A LOT
WALKING IN HOSPITAL ROOM: A LOT
TOILETING: A LOT
HELP NEEDED FOR BATHING: TOTAL
HELP NEEDED FOR BATHING: TOTAL
MOBILITY SCORE: 11
MOBILITY SCORE: 12
TOILETING: TOTAL
DRESSING REGULAR UPPER BODY CLOTHING: A LOT
PERSONAL GROOMING: A LOT
TURNING FROM BACK TO SIDE WHILE IN FLAT BAD: A LOT
MOVING TO AND FROM BED TO CHAIR: A LOT
DRESSING REGULAR LOWER BODY CLOTHING: TOTAL
PERSONAL GROOMING: A LOT
EATING MEALS: A LOT
DAILY ACTIVITIY SCORE: 9
STANDING UP FROM CHAIR USING ARMS: A LOT
MOVING FROM LYING ON BACK TO SITTING ON SIDE OF FLAT BED WITH BEDRAILS: A LOT
DRESSING REGULAR UPPER BODY CLOTHING: TOTAL
TURNING FROM BACK TO SIDE WHILE IN FLAT BAD: A LOT
CLIMB 3 TO 5 STEPS WITH RAILING: A LOT

## 2024-05-25 ASSESSMENT — PAIN - FUNCTIONAL ASSESSMENT
PAIN_FUNCTIONAL_ASSESSMENT: 0-10

## 2024-05-25 ASSESSMENT — PAIN SCALES - GENERAL
PAINLEVEL_OUTOF10: 0 - NO PAIN

## 2024-05-25 ASSESSMENT — PAIN SCALES - WONG BAKER: WONGBAKER_NUMERICALRESPONSE: NO HURT

## 2024-05-25 NOTE — PROCEDURES
"Speech-Language Pathology    SLP Adult Inpatient MBSS  Evaluation    Patient Name: Vinicius Arriola  MRN: [id number]  Today's Date: 5/25/2024   Time Calculation  Start Time: 0815  Stop Time: 0840  Time Calculation (min): 25 min       Assessment/Impression:   Mild oropharyngeal dysphagia characterized by prolonged mastication/bolus formation and mild residue at the valleculae following the swallow.      Full detailed SLP/Radiologist Modified Barium Swallow study report can be found under Chart Review tab, Imaging tab and  titled \"FL Modified Barium Swallow Study\"       Recommendations:  Solid Diet Recommendations : Easy to Chew  Liquid Diet Recommendations: Thin   Medication Administration: Single w/ sips of water, whole in puree as needed      Safe Swallow Strategies/Guidelines:  Only present PO intake when awake and alert  Supervision/assist w/ PO intake to assure adherence to safe swallow strategies   Upright positioning   Slow rate/small boluses         Plan:  SLP Plan: Skilled SLP warranted  SLP Frequency: 2x per week  Duration: 1 weeks      Discussed POC: Patient, Medical Team   Discussed Risks/Benefits: Yes  Patient/Caregiver Agreeable: Yes        Goals:  Pt will tolerate least restrictive diet with adequate oral phase and no s/s of aspiration.   Date initiated: 5/25/24   Status: Goal initiated     Pt will adhere to safe swallow strategies during PO intake with > 90% accuracy independently.      Date initiated: 5/25/24   Status: Goal initiated        Subjective:  Pt arrived via transport for MBSS.  Verbally consented to the exam and tolerated with no s/s of distress.  O2 via NC.  NGT in place.       Relevant SLP Hx:  Per documentation, \"Vinicius Arriola is a 88 y.o. year old male patient with Past Medical History of  PAD, afib/flutter, hx DVT on warfarin, HTN, CAD s/p stent, mitral regurg, HLD, polycythemia vera, managed by Dr. Rothman ([facility]), JOVANNI, basal cell ca on face, s/p removal and radiation of malignant " "mesothelioma, S/p L VATS with decort 5/2022  c/b post op ileus, XRT 9-10/2022, found to have a recurrence of Mesothelioma on PET scan and underwent first chemotherapy session on 5/15/2024, after which he developed fever, diarrhea, nausea an vomiting. Admitted to [facility] and treated empirically for sepsis presumed due to RLL Pneumonia iso of CT CAP showing significant collapse of the left lung worsened appearence of known Mesothelioma with concern for tumor thrombus, new chest wall invasion, right lower lobe ill-defined patchy infiltrate, as well as mild uncomplicated diverticulitis. Hospital course complicated with hypotension requiring LR and pressors, hematochezia and anemia requiring blood transfusion, prerenal VIK, cellulitis, and diverticulitis. Patient presenting with continued hematochezia, dark red blood, with GI following,plan for sigmoidoscopy 5/24/23\".  GI cleared for PO trials per RN.  S/s of aspiration following 3 oz Swallow Protocol, further evaluation warranted via MBSS.           Objective:   General Visit Information:  Type of Study: Initial MBS  Consistencies Trialed: Thin (IDDSI Level 0) - Spoon and Straw, Pureed/extremely thick (IDDSI Level 4), Regular (IDDSI Level 7)  View: Lateral      Oral/Motor Assessment:  Oral Hygiene: WFL  Dentition: Upper and lower dentures    Oral Motor: WFL  Facial Symmetry: WFL  Vocal Quality (Perceptually): WFL        Oral Phase:  Mild deficits.    Adequate bolus acceptance.  Prolonged mastication and bolus formation/transit w/ regular solids.  No oral residue.       Pharyngeal Phase:  Mild deficits.    Incomplete epiglottic inversion, may be related to presence of NGT.  Timely swallow initiation.  Adequate hyolaryngeal elevation/excursion.  Trace penetration x1 with consecutive boluses of thin liquids related to incomplete airway protection.  Ejected upon swallow completion.  No other penetration and no aspiration with any other consistency assessed.  Mild residue " at the valleculae following the swallow.  Largely cleared with liquid wash.  Mildly reduced distention of PES.           [place]:  Thin: 2 - Material enters airway, remains above the folds, ejected from airway.  [place]: 1 - Material does not enter airway.   [place]ure[place]: 1 - Material does not enter airway.   Solids: 1 - Material does not enter airway.      Dysphagia Therapy:  Results and recommendations d/w pt using video screen as visual aid, including diet recommendations and safe swallow strategies.  Pt required mod cues to recall safe swallow strategies.  Will benefit from continued education.        05/25/24 at 10:10 AM - Meri Hurley, SLP

## 2024-05-25 NOTE — CARE PLAN
Problem: Fall/Injury  Goal: Not fall by end of shift  Outcome: Progressing  Goal: Be free from injury by end of the shift  Outcome: Progressing  Goal: Verbalize understanding of personal risk factors for fall in the hospital  Outcome: Progressing  Goal: Verbalize understanding of risk factor reduction measures to prevent injury from fall in the home  Outcome: Progressing  Goal: Use assistive devices by end of the shift  Outcome: Progressing  Goal: Pace activities to prevent fatigue by end of the shift  Outcome: Progressing     Problem: Skin  Goal: Prevent/manage excess moisture  Outcome: Progressing  Goal: Prevent/minimize sheer/friction injuries  Outcome: Progressing  Goal: Promote/optimize nutrition  Outcome: Progressing  Goal: Promote skin healing  Outcome: Progressing   The patient's goals for the shift include      The clinical goals for the shift include Pt pain to remain less thasn 7/10 thru shift.

## 2024-05-25 NOTE — PROGRESS NOTES
Vancomycin Dosing by Pharmacy- FOLLOW UP    Vinicius Arriola is a 88 y.o. year old male who Pharmacy has been consulted for vancomycin dosing for pneumonia. Based on the patient's indication and renal status this patient is being dosed based on a goal trough/random level of 15-20.     Renal function is currently declining.    Current vancomycin dose: dose by level     Most recent random level: 18.2 mcg/mL    Visit Vitals  /82 (BP Location: Left arm)   Pulse 103   Temp 36.1 °C (97 °F)   Resp 18        Lab Results   Component Value Date    CREATININE 3.62 (H) 2024    CREATININE 3.55 (H) 2024    CREATININE 3.15 (H) 2024    CREATININE 3.00 (H) 2024        Patient weight is as follows:   Vitals:    24 0600   Weight: 115 kg (253 lb 4.9 oz)       Cultures:  No results found for the encounter in last 14 days.       I/O last 3 completed shifts:  In: 945 (8.2 mL/kg) [Blood:345; IV Piggyback:600]  Out: 975 (8.5 mL/kg) [Urine:775 (0.2 mL/kg/hr); Emesis/NG output:200]  Weight: 114.9 kg   I/O during current shift:  I/O this shift:  In: -   Out: 150 [Urine:150]    Temp (24hrs), Av.5 °C (97.7 °F), Min:36.1 °C (97 °F), Max:37 °C (98.6 °F)      Assessment/Plan    Within goal random/trough level. Will re-dose with 500 mg x 1 and follow up with AM level .   The next level will be obtained on  at 0500. May be obtained sooner if clinically indicated.   Will continue to monitor renal function daily while on vancomycin and order serum creatinine at least every 48 hours if not already ordered.  Follow for continued vancomycin needs, clinical response, and signs/symptoms of toxicity.       Dinora Padron, JohannaD

## 2024-05-25 NOTE — PROGRESS NOTES
Vinicius Arriola is a 88 y.o. male on day 5 of admission presenting with 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Multi).    Subjective   Seen and assessed at bedside. No acute complaints.     Hosp Course:  Vinicius Arriola is a 88 y.o. year old male patient with Past Medical History of  PAD, afib/flutter, hx DVT on warfarin, HTN, CAD s/p stent, mitral regurg, HLD, polycythemia vera, managed by Dr. Rothman ([facility]), JOVANNI, basal cell ca on face, s/p removal and malignant mesothelioma, S/p L VATS with decort 5/2022  c/b post op ileus, XRT 9-10/2022, presenting to [facility] with abdominal pain, fever and diarrhea post first Chemotherapy session on 5/16. Treated empirically for sepsis presumed due to UTI/ Pneumonia, CT CAP with concern for tumor thrombus and collapse of RLL along with known mesothelioma,  presented to MICU 5/23 for hypotension (on levo) and worsening bleeding.      FMS removed upon arrival to MICU. BP stable, and vasopressors able to be weaned off. Pt was transitioned from NRB to Airvo, to nasal canula. He had a flexible sigmoidoscopy with blood seen in rectum but no evidence of active bleeding. Course c/b VIK.        Objective     Physical Exam  Constitutional:       Appearance: He is ill-appearing.   HENT:      Head: Normocephalic and atraumatic.   Eyes:      Extraocular Movements: Extraocular movements intact.   Cardiovascular:      Rate and Rhythm: Regular rhythm. Tachycardia present.      Pulses: Normal pulses.      Heart sounds: Normal heart sounds.   Pulmonary:      Effort: Pulmonary effort is normal.      Breath sounds: No wheezing.      Comments: Hyperresonant breath sounds on left  Abdominal:      General: Abdomen is flat. There is distension.      Palpations: Abdomen is soft.      Tenderness: There is abdominal tenderness. There is no guarding or rebound.      Comments: Generalized tenderness   Musculoskeletal:      Right lower leg: No edema.      Left lower leg: No edema.   Skin:     General: Skin is dry.      Findings:  "Bruising and erythema present.      Comments: Contusion bilateral hand and left arm. LLE erythema, outlined   Neurological:      Mental Status: He is alert.     Last Recorded Vitals  Blood pressure 123/83, pulse 76, temperature 36.2 °C (97.2 °F), temperature source Temporal, resp. rate 18, height 1.727 m (5' 7.99\"), weight 115 kg (253 lb 4.9 oz), SpO2 93%.  Intake/Output last 3 Shifts:  I/O last 3 completed shifts:  In: 2562.1 (22.3 mL/kg) [I.V.:2.1 (0 mL/kg); Blood:960; IV Piggyback:1600]  Out: 355 (3.1 mL/kg) [Urine:350 (0.1 mL/kg/hr); Emesis/NG output:5]  Weight: 114.9 kg     Relevant Results  No current facility-administered medications on file prior to encounter.     Current Outpatient Medications on File Prior to Encounter   Medication Sig Dispense Refill    ascorbic acid (Vitamin C) 1,000 mg tablet Take 1 tablet (1,000 mg) by mouth once daily.      aspirin 81 mg EC tablet Take 1 tablet (81 mg) by mouth once daily at bedtime.      carboxymethylcellulose (Refresh Celluvisc) 1 % ophthalmic solution dropperette Administer 1 drop into affected eye(s) once daily in the morning.      ferrous sulfate 325 (65 Fe) MG EC tablet Take 65 mg by mouth 2 times a day with meals. Do not crush, chew, or split.      ferrous sulfate, 325 mg ferrous sulfate, tablet TAKE 1 TABLET BY MOUTH TWICE A DAY WITH MEALS 180 tablet 3    furosemide (Lasix) 20 mg tablet Take 1 tablet (20 mg) by mouth once daily.      melatonin 5 mg tablet Take 1 tablet (5 mg) by mouth as needed at bedtime.      metoprolol tartrate (Lopressor) 25 mg tablet Take 1 tablet (25 mg) by mouth 2 times a day.      nitroglycerin (Nitrostat) 0.4 mg SL tablet Place under the tongue every 5 minutes if needed for chest pain (up to 3 doses).      omega-3 fatty acids-fish oil 360-1,200 mg capsule Take 1 capsule (1,200 mg) by mouth twice a day.      potassium chloride CR (Klor-Con) 10 mEq ER tablet Take 1 tablet (10 mEq) by mouth once daily. 90 tablet 3    promethazine-DM " (Phenergan-DM) 6.25-15 mg/5 mL syrup Take 1.25 mL by mouth 4 times a day as needed for cough.      rosuvastatin (Crestor) 10 mg tablet Take 1 tablet (10 mg) by mouth once daily.      traZODone (Desyrel) 50 mg tablet Take 2 tablets (100 mg) by mouth once daily at bedtime.      warfarin (Coumadin) 1 mg tablet Take 1-2 tablets daily or as directed per [facility] 90 tablet 1    warfarin (Coumadin) 2 mg tablet Take 1 tablet (2 mg) by mouth see administration instructions. Take 1 tablet daily or as directed per [facility] 90 tablet 0    warfarin (Coumadin) 4 mg tablet TAKE AS DIRECTED Per [facility] Protime Department 90 tablet 0    ZINC ORAL Take 1 tablet by mouth once daily.           Last CBC:  Lab Results   Component Value Date    WBC 11.6 (H) 05/24/2024    HGB 8.2 (L) 05/24/2024    HCT 24.2 (L) 05/24/2024    MCV 82 05/24/2024    PLT 57 (L) 05/24/2024       Last RFP:  Lab Results   Component Value Date    GLUCOSE 85 05/24/2024    CALCIUM 7.9 (L) 05/24/2024     (H) 05/24/2024    K 3.6 05/24/2024    CO2 25 05/24/2024     05/24/2024    BUN 69 (H) 05/24/2024    CREATININE 3.55 (H) 05/24/2024       Last LFTs:  Lab Results   Component Value Date    ALT 24 05/24/2024    AST 38 05/24/2024    ALKPHOS 56 05/24/2024    BILITOT 0.7 05/24/2024       Last coags:  Lab Results   Component Value Date    INR 1.2 (H) 05/23/2024    APTT 25 (L) 05/23/2024                      Assessment/Plan   Principal Problem:    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Multi)  Active Problems:    Anemia    88M PMHx PAD, afib/flutter, hx DVT on warfarin, HTN, CAD s/p stent, mitral regurg, HLD, polycythemia vera, managed by Dr. Rothman ([facility]), JOVANNI, basal cell ca on face, s/p removal and radiation of malignant mesothelioma, S/p L VATS with decort 5/2022 c/b post op ileus, XRT 9-10/2022, now with recurrent mesothelioma s/p C1 permetrexed 5/14. Presented to [place] 5/14 with abdominal pain, fever, diarrhea, urinary retention c/f sepsis presumed 2/2  PNA; CT CAP showed known LLL collapse 2/2 mesothelioma, new post L chest wall invasion, and c/f L pulmonary trunk tumor thrombus.  5/23 developed shock and acute hypoxic resp failure requiring MICU transfer for pressors and NIV (airvo), thought 2/2 GI bleeding with melena and coffee ground drainage from NGT. Quickly weaned off pressors, s/p flex sig with blood in rectum but no active bleed. Now transferred back to floor for further mgmt.     [ ] resume diet 5/25  [ ] trend hgb BID    #coffee grounds via NGT (placed 5/21), improved   #melena likely d/t GI bleed  :: KUB 5/21, showing moderate distention of stomach with nonspecific predominantly fluid-filled bowel pattern.  :: 5/20-5/21 patient emesis that is watery, dark brown, malodorous   :: did initially present w/ diarrhea could have been chemo induced vs stool ball  :: INR 4.9>6.3>2.4 >1.2  :: s/p Vitamin K 10 mg  - holding ferrous sulfate    - ctm NGT output   - pantoprazole 40 bid   - GI consulted, appreciate recs  -s/p flex sig with no evidence of active bleed; resume diet 5/25 NPO overnight      #Pneumonia, Right lower lobe ill-defined patchy infiltrate  :: Resp Cult salivary contamination  :: MRSA nares: negative  :: Strep/Legionella antigens: negative  :: CXR 5/21 showing persistent patchy right infrahilar airspace opacities    :: 5/22 CT CAP w/o contrast: Interval improvement of the right lower lobe patchy infiltrate  :: s/p Azithro (5/16-5/18)  :: s/p vanc (5/16-5/18) (5/20-5/22) and zosyn (5/16-5/18) (5/20-22)  - meropenem (5/19-5/20) (5/22-) and micafungin (5/22-)      #Tachypnea   #elevated A-a gradient   #AHRF, improved  :: multifactorial, like d/t shunt secondary to malignancy vs pneumonia vs v/q mismatch   :: RR 23-44   :: 5/22 ABG: pH 7.53, pCO2 31, pO2 109, Lactate 2.2, FiO2 40.  - On 3L nasal cannula with intermittent AIRVO (5/22-) to assist with work of breathing   - gradually wean supplemental O2 for SpO2 goal >90%   - continue abx regimen  -  "CTM     # Worsening mesothelioma  # tumor Thrombus  #Mesothelioma with Loculated Pleural Effusions   #Left Hilar Mass, Left Pulmonary Trunk Mass  #History JOVANNI, Pulmonary Embolism  :: primary oncologist Dr. Galvan  :: s/p L VATS w/ decort 5/2022, XRT 9-10/22  :: s/p \"half-dose\" pemfexy on 5/14  :: CT chest 5/18 with significant collapse of left lung, loculated pleural effusions consistent with patient's known mesothelioma.  Also shows ill-defined patchy infiltrate in right lower lobe.  Shows left hilar mass with extension into left pulmonary trunk likely tumor thrombus   - Holding warfarin, continue to monitor   - Dr. Henderson recommends transitioning to Eliquis 5mg BID, deferring anticoagulation iso thrombocytopenia  - continuing protocol w/ 1 mg folic acid daily     #Fever  #Sepsis most likely d/t pneumonia  :: at OSH met SIRS criteria (febrile, tachypnea)  :: UA 5/18 with small LE, 3+ bacteria, urine cx 5/21 negative  :: Resp Cult salivary contamination  :: MRSA nares: negative  :: Strep/Legionella antigens: negative  :: c diff and enteric stool panel: negative  :: BC 5/16 NGTD, repeat BC 5/20 NGTD  :: CRP 30.46>42.36  :: CXR 5/21 showing persistent patchy right infrahilar airspace opacities    :: s/p Azithro (5/16-5/18)  :: asymmetric warmth, erythema, edema noted in left lower extremity   :: s/p vanc (5/16-5/18) (5/20-5/22) and zosyn (5/16-5/18) (5/20-22)  :: 5/22 CT CAP w/o contrast: Interval improvement of the right lower lobe patchy infiltrate, Interval flattening of the inferior vena cava which may be seen with hypovolemia, Improved proximal sigmoid diverticulitis.  - repeat blood cultures drawn 5/22, NGTD   - meropenem (5/19-5/20) (5/22-) and micafungin (5/22-)     #Lower leg skin changes likely d/t cellulitis   - improved margins of left lower extremity skin changes, will ctm     #anemia, likely d/t GI bleed   :: melena and coffee grounds via NGT noted 5/22  :: Hgb 10.3>9.4>6.9  - Patient consented to 1 unit " RBC, 5/23  - Reorder CBC post-transfusion   - Continue to trend Hgb BID (goal > 7), platelets (goal > 50)       #VIK on CKD, likely prerenal d/t fluid status   # CKD (baseline Cr 1.3-1.4)  # urine retention s/p Boyd Catheter Placement by Urology in OSH  :: US renal 5/16: Nonobstructing 1 cm left renal calculus, no hydronephrosis  :: Baseline Cr 1.3-1.4  :: Cr 1.64>2.26>3.00  - will eventually need boyd removal and trial  - strict in/out  - tamsulosin 0.4 mg daily- holding iso NPO  -minimize nephrotoxic medications as able  - 2.5 L fluids given overnight (5/23)   - boyd removed 5/23/24 due to it being placed since 5/16     #Pancytopenia, most likely medication induced from pemfexy  #Thrombocytopenia  :: c/f TTP, blood smear pending   ::   :: s/p pemfexy 5/14  :: d dimer 1521, fibrinogen >1000- no c/f dic  :: no schistocytes on smear  ::   :: filgrastim 480 mcg (5/21-5/22)  - 5/23 ANC 4130   - haptoglobin 400   - Platelets 55>38, ctm   - s/p platelet transfusion given evidence of bleeding 5/23/24     #Elevated INR  :: INR 4.9>6.3>2.4 >1.2  :: s/p Vitamin K 10 mg  -CTM    #History Afib, HLD, CAD, PAD  #History DVT/PE on Warfarin  :: 5/21 TTE showing:  Left ventricular systolic function is hyperdynamic with a 70-75% estimated ejection fraction, Mildly elevated RVSP, Moderately dilated aortic root   - Holding home metoprolol 25 mg twice daily  - Holding warfarin, aspirin  - Continue home statin- holding iso npo    # Hospital-acquired Delirium  :: waxing and waning altered mental status  - apply delirium precautions  -hold home trazodone for insomnia     Antibiotics:: meropenem + micafungin   Dispo: pending PT/ OT snf     F: Replete PRN  E: Keep Mg >2, phos >3  and K >4  N: NPO  A: PIVx2  O2: On aervo  Pressors: Levo  DVT ppx: none iso thrombocytopenia  Code Status: DNR/DNI   Contact: Kirsten Arriola (Spouse)  [phone] (Work Phone)     Rhianna Holland MD

## 2024-05-25 NOTE — PROGRESS NOTES
Vancomycin Dosing by Pharmacy- Cessation of Therapy    Consult to pharmacy for vancomycin dosing has been discontinued by the prescriber, pharmacy will sign off at this time.    Please call pharmacy if there are further questions or re-enter a consult if vancomycin is resumed.     Scarlet Rick, PharmD

## 2024-05-25 NOTE — SIGNIFICANT EVENT
Floor Readiness Note       I, personally, evaluated Vinicius Arriola prior to transfer to the floor, including reviewing all current laboratory and imaging studies. The patient remains appropriate for transfer to the floor. Bedside nurse and respiratory therapy are also in agreement of patient's readiness for the floor.     Brief summary:  Vinicius Arriola is a 88 y.o. male who was admitted to the MICU on 5/23/2024 for hypotension in the setting of lower GI bleeding. They have been treated with pRBC and platelet transfusion. Flexible sigmoidoscopy on 5/24 visualized blood present in the rectum with scattered diverticulosis. No evidence of active bleeding. Continue IV PPI BID per GI with consideration to start diet tomorrow 5/25. Patient hemodynamically stable at this time for transfer to the floor.     Updated focused Physical Exam:    Constitutional:       Appearance: He is ill-appearing.   HENT:      Head: Normocephalic and atraumatic.   Eyes:      Extraocular Movements: Extraocular movements intact.   Cardiovascular:      Rate and Rhythm: Regular rhythm. Tachycardia present.      Pulses: Normal pulses.      Heart sounds: Normal heart sounds.   Pulmonary:      Effort: Pulmonary effort is normal.      Breath sounds: No wheezing.      Comments: Hyperresonant breath sounds on left  Abdominal:      General: Abdomen is flat. There is distension.      Palpations: Abdomen is soft.      Tenderness: There is abdominal tenderness. There is no guarding or rebound.      Comments: Generalized tenderness   Musculoskeletal:      Right lower leg: No edema.      Left lower leg: No edema.   Skin:     General: Skin is dry.      Findings: Bruising and erythema present.      Comments: Contusion bilateral hand and left arm. LLE erythema, outlined   Neurological:      Mental Status: He is alert.     Current Vital Signs:  Heart Rate: 110 (05/24/24 2000 : Marvin Chavez RN)  BP: 123/63 (05/24/24 2000 : Marvin Chavez RN)  Temp: 37 °C  (98.6 °F) (05/24/24 1545 : Marvin Way)  Resp: (!) 31 (05/24/24 2000 : Marvin Chavez, RN)  SpO2: 94 % (05/24/24 2000 : Marvin Chavez, RN)    Relevant updates since rounds:    Flex sig completed by GI 5/24, no active bleeding.    Accepting team, step down, received verbal sign out and the Provider Care team/Attending has been updated. Bedside nurse will now call accepting nurse for report and patient will be transferred to [facility].    Jerel Milligan MD  \

## 2024-05-25 NOTE — PROGRESS NOTES
Subjective   Pt pulled NGT overnight    Pt notes that breathing is better. Notes RUQ pain. Denies dysuria. Denies chest pain, shortness of breath, nausea, vomiting         Objective     Vitals:  Vitals:    05/25/24 1353   BP: 111/71   Pulse: 104   Resp: 18   Temp: 36.2 °C (97.2 °F)   SpO2: 97%       I/O last 3 completed shifts:  In: 945 (8.2 mL/kg) [Blood:345; IV Piggyback:600]  Out: 975 (8.5 mL/kg) [Urine:775 (0.2 mL/kg/hr); Emesis/NG output:200]  Weight: 114.9 kg   I/O this shift:  In: -   Out: 300 [Urine:300]    Physical exam:  Constitutional: Ill appearing  HEENT: Normocephalic, atraumatic. PERRL. EOMI. No cervical lymphadenopathy.  Respiratory: hyperresonant breath sounds on left  Cardiovascular: RRR. No murmurs, gallops, or rubs. No JVD. Radial pulses 2+.  Abdominal: tender to palpation in ruq. Bowel sounds present. No hepatosplenomegaly or masses. No CVA tenderness.  Neuro: CN II-XII intact. UE and LE strength 5/5 bilaterally and sensation intact. Normal FTN testing.  MSK: No LE edema bilaterally.  Skin: contusion b/l hand and L arm. LLE erythema, outlined  Psych: Appropriate mood and affect.    Medications:  [Held by provider] ascorbic acid, 1,000 mg, oral, Daily  [Held by provider] aspirin, 81 mg, oral, Nightly  [Held by provider] ergocalciferol, 1,250 mcg, oral, Every Sunday  nystatin, 1 Application, Topical, BID  oxygen, , inhalation, Continuous - Inhalation  pantoprazole, 40 mg, intravenous, BID  perflutren protein A microsphere, 0.5 mL, intravenous, Once in imaging  [Held by provider] rosuvastatin, 10 mg, oral, Nightly  sulfur hexafluoride microsphr, 2 mL, intravenous, Once in imaging  [Held by provider] tamsulosin, 0.4 mg, oral, Daily  [Held by provider] traZODone, 100 mg, oral, Nightly         PRN medications: acetaminophen, albuterol, carboxymethylcellulose, diclofenac sodium, melatonin, [Held by provider] nitroglycerin, ondansetron **OR** ondansetron, oxygen    Labs:  Results for orders placed or  performed during the hospital encounter of 05/19/24 (from the past 24 hour(s))   POCT GLUCOSE   Result Value Ref Range    POCT Glucose 83 74 - 99 mg/dL   Comprehensive Metabolic Panel   Result Value Ref Range    Glucose 80 74 - 99 mg/dL    Sodium 147 (H) 136 - 145 mmol/L    Potassium 3.5 3.5 - 5.3 mmol/L    Chloride 108 (H) 98 - 107 mmol/L    Bicarbonate 25 21 - 32 mmol/L    Anion Gap 18 10 - 20 mmol/L    Urea Nitrogen 73 (H) 6 - 23 mg/dL    Creatinine 3.62 (H) 0.50 - 1.30 mg/dL    eGFR 16 (L) >60 mL/min/1.73m*2    Calcium 8.3 (L) 8.6 - 10.6 mg/dL    Albumin 2.4 (L) 3.4 - 5.0 g/dL    Alkaline Phosphatase 67 33 - 136 U/L    Total Protein 5.1 (L) 6.4 - 8.2 g/dL    AST 33 9 - 39 U/L    Bilirubin, Total 0.6 0.0 - 1.2 mg/dL    ALT 18 10 - 52 U/L   Magnesium   Result Value Ref Range    Magnesium 2.54 (H) 1.60 - 2.40 mg/dL   Phosphorus   Result Value Ref Range    Phosphorus 3.3 2.5 - 4.9 mg/dL   Vancomycin   Result Value Ref Range    Vancomycin 18.2 5.0 - 20.0 ug/mL   CBC and Auto Differential   Result Value Ref Range    WBC 9.6 4.4 - 11.3 x10*3/uL    nRBC 0.2 (H) 0.0 - 0.0 /100 WBCs    RBC 2.89 (L) 4.50 - 5.90 x10*6/uL    Hemoglobin 8.2 (L) 13.5 - 17.5 g/dL    Hematocrit 25.9 (L) 41.0 - 52.0 %    MCV 90 80 - 100 fL    MCH 28.4 26.0 - 34.0 pg    MCHC 31.7 (L) 32.0 - 36.0 g/dL    RDW 18.4 (H) 11.5 - 14.5 %    Platelets 51 (L) 150 - 450 x10*3/uL    Immature Granulocytes %, Automated 3.9 (H) 0.0 - 0.9 %    Immature Granulocytes Absolute, Automated 0.37 0.00 - 0.50 x10*3/uL   Manual Differential   Result Value Ref Range    Neutrophils %, Manual 92.2 40.0 - 80.0 %    Bands %, Manual 2.6 0.0 - 5.0 %    Lymphocytes %, Manual 1.7 13.0 - 44.0 %    Monocytes %, Manual 1.7 2.0 - 10.0 %    Eosinophils %, Manual 1.8 0.0 - 6.0 %    Basophils %, Manual 0.0 0.0 - 2.0 %    Seg Neutrophils Absolute, Manual 8.85 (H) 1.60 - 5.00 x10*3/uL    Bands Absolute, Manual 0.25 0.00 - 0.50 x10*3/uL    Lymphocytes Absolute, Manual 0.16 (L) 0.80 - 3.00  x10*3/uL    Monocytes Absolute, Manual 0.16 0.05 - 0.80 x10*3/uL    Eosinophils Absolute, Manual 0.17 0.00 - 0.40 x10*3/uL    Basophils Absolute, Manual 0.00 0.00 - 0.10 x10*3/uL    Total Cells Counted 115     Neutrophils Absolute, Manual 9.10 (H) 1.60 - 5.50 x10*3/uL    RBC Morphology No significant RBC morphology present    POCT GLUCOSE   Result Value Ref Range    POCT Glucose 74 74 - 99 mg/dL   Urine electrolytes   Result Value Ref Range    Sodium, Urine Random 33 mmol/L    Sodium/Creatinine Ratio 31 Not established. mmol/g Creat    Potassium, Urine Random 23 mmol/L    Potassium/Creatinine Ratio 21 Not established mmol/g Creat    Chloride, Urine Random 15 mmol/L    Chloride/Creatinine Ratio 14 (L) 23 - 275 mmol/g creat    Creatinine, Urine Random 108.0 20.0 - 370.0 mg/dL       Imaging:  Flexible Sigmoidoscopy    Result Date: 5/24/2024  Table formatting from the original result was not included. Impression Blood present in the rectum Scattered diverticulosis in the sigmoid colon The sigmoid colon, rectosigmoid and rectum appeared normal. Green bilious stool coming more proximally, without any blood, blood clots or hematin. Likely bleeding from rectum or sigmoid, no evidence of active bleeding at this time Findings Blood present in the rectum. Blood visualized in the rectum. Blood was cleared without any active bleeding appreciated. No mucosal breaks. Few small and large, scattered diverticula with no inflammation in the sigmoid colon; no bleeding was identified. Diverticula were irrigated without any bleeding or stigmata of recent bleed appreciated. All observed locations appeared normal, including the sigmoid colon, rectosigmoid and rectum. No mucosal abnormalities. Normal on retroflexion. Green bilious stool coming more proximally, without any blood, blood clots or hematin. Recommendation  Follow up with primary gastroenterologist  Follow up with inpatient GI consult service, Dr. Valenzuela and Dr. Archibald Continue on  IV PPI BID  Indication Iron deficiency anemia due to chronic blood loss Staff Staff Role Kaylen Valenzuela MD Proceduralist Ale Archbiald MD Medications micafungin (Mycamine) 100 mg in dextrose 5% 100 mL IV 95.24 mg*  *From user-documented volume fentaNYL PF (Sublimaze) injection  - Omnicell Override Pull Cannot be calculated*  *Administration dose not documented midazolam (Versed) injection  - Omnicell Override Pull Cannot be calculated*  *Administration dose not documented (Totals for administrations occurring from 1228 to 1404 on 05/24/24) Preprocedure A history and physical has been performed, and patient medication allergies have been reviewed. The patient's tolerance of previous anesthesia has been reviewed. The risks and benefits of the procedure and the sedation options and risks were discussed with the patient. All questions were answered and informed consent obtained. Details of the Procedure The patient underwent no sedation. The patient's blood pressure, ECG, heart rate, level of consciousness, oxygen and respirations were monitored throughout the procedure. A digital rectal exam was performed. A perianal exam was performed. The scope was introduced through the anus and advanced to the transverse colon. Retroflexion was performed in the rectum. The quality of bowel preparation was evaluated using the [place] Bowel Preparation Scale with scores of: left colon = 3. Bowel prep was not adequate. The patient experienced no blood loss. The procedure was not difficult. The patient tolerated the procedure well. There were no apparent adverse events. Events Procedure Events Event Event Time ENDO SCOPE IN TIME 5/24/2024  1:08 PM ENDO SCOPE OUT TIME 5/24/2024  1:26 PM Specimens No specimens collected Procedure Location [facility] [address] [facility] [id number] [phone] Referring Provider Grayson Nichols, APRN- [address] [place] [address] Procedure Provider Ale Archibald MD           Assessment/Plan   88M PMHx PAD, afib/flutter, hx DVT on warfarin, HTN, CAD s/p stent, mitral regurg, HLD, polycythemia vera, managed by Dr. 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 Presented to [facility] 5/14 with abdominal pain, fever, diarrhea, urinary retention c/f sepsis presumed 2/2 PNA; CT CAP showed known LLL collapse 2/2 mesothelioma, new post L chest wall invasion, and c/f L pulmonary trunk tumor thrombus.  5/23 developed shock and acute hypoxic resp failure requiring MICU transfer for pressors and NIV (airvo), thought 2/2 GI bleeding with melena and coffee ground drainage from NGT. Quickly weaned off pressors, s/p flex sig with blood in rectum but no active bleed. Now transferred back to floor for further mgmt.     Updates 5/25  -CLD  -passed SLP eval  - Per GI, will hold off on EGD for now. Pt too unstable in ICU for EGD, and coffee brown emesis resolved  - bladder scan  - urine electrolytes, urea  - kub shows distended bowel loops  - NPO  - NGT insertion  - vanc, meropenem, micafungin discontinued    #coffee grounds via NGT (placed 5/21), improved   #melena likely d/t GI bleed  :: KUB 5/21, showing moderate distention of stomach with nonspecific predominantly fluid-filled bowel pattern.  :: 5/20-5/21 patient emesis that is watery, dark brown, malodorous   :: did initially present w/ diarrhea could have been chemo induced vs stool ball  :: INR 4.9>6.3>2.4 >1.2  :: s/p Vitamin K 10 mg  :: KUB w/ dilated bowel loops   - NPO  - NGT to be replaced  - holding ferrous sulfate    - pantoprazole 40 bid   - GI consulted, appreciate recs  -s/p flex sig with no evidence of active bleed     #Pneumonia, Right lower lobe ill-defined patchy infiltrate  :: Resp Cult  "salivary contamination  :: MRSA nares: negative  :: Strep/Legionella antigens: negative  :: CXR 5/21 showing persistent patchy right infrahilar airspace opacities    :: 5/22 CT CAP w/o contrast: Interval improvement of the right lower lobe patchy infiltrate  :: s/p Azithro (5/16-5/18)  :: s/p vanc (5/16-5/18) (5/20-5/22) and zosyn (5/16-5/18) (5/20-22)  - meropenem (5/19-5/20) (5/22-) and micafungin (5/22-)      #Tachypnea   #elevated A-a gradient   #AHRF, improved  :: multifactorial, like d/t shunt secondary to malignancy vs pneumonia vs v/q mismatch   :: RR 23-44   :: 5/22 ABG: pH 7.53, pCO2 31, pO2 109, Lactate 2.2, FiO2 40.  - On 3L nasal cannula with intermittent AIRVO (5/22-) to assist with work of breathing   - gradually wean supplemental O2 for SpO2 goal >90%   - continue abx regimen  - CTM      # Worsening mesothelioma  # tumor Thrombus  #Mesothelioma with Loculated Pleural Effusions   #Left Hilar Mass, Left Pulmonary Trunk Mass  #History JOVANNI, Pulmonary Embolism  :: primary oncologist Dr. Galvan  :: s/p L VATS w/ decort 5/2022, XRT 9-10/22  :: s/p \"half-dose\" pemfexy on 5/14  :: CT chest 5/18 with significant collapse of left lung, loculated pleural effusions consistent with patient's known mesothelioma.  Also shows ill-defined patchy infiltrate in right lower lobe.  Shows left hilar mass with extension into left pulmonary trunk likely tumor thrombus   - Holding warfarin, continue to monitor   - Dr. Henderson recommends transitioning to Eliquis 5mg BID, deferring anticoagulation iso thrombocytopenia  - continuing protocol w/ 1 mg folic acid daily      #Fever  #Sepsis most likely d/t pneumonia  :: at OSH met SIRS criteria (febrile, tachypnea)  :: UA 5/18 with small LE, 3+ bacteria, urine cx 5/21 negative  :: Resp Cult salivary contamination  :: MRSA nares: negative  :: Strep/Legionella antigens: negative  :: c diff and enteric stool panel: negative  :: BC 5/16 NGTD, repeat BC 5/20 NGTD  :: CRP 30.46>42.36  :: " CXR 5/21 showing persistent patchy right infrahilar airspace opacities    :: s/p Azithro (5/16-5/18)  :: asymmetric warmth, erythema, edema noted in left lower extremity   :: s/p vanc (5/16-5/18) (5/20-5/25) and zosyn (5/16-5/18) (5/20-22)  :: 5/22 CT CAP w/o contrast: Interval improvement of the right lower lobe patchy infiltrate, Interval flattening of the inferior vena cava which may be seen with hypovolemia, Improved proximal sigmoid diverticulitis.  - repeat blood cultures drawn 5/22, NGTD   - meropenem (5/19-5/20) (5/22-5/25) and micafungin (5/22-5/25)      #Lower leg skin changes likely d/t cellulitis   - improved margins of left lower extremity skin changes, will ctm      #anemia, likely d/t GI bleed   :: melena and coffee grounds via NGT noted 5/22  :: Hgb 10.3>9.4>6.9  - Patient consented to 1 unit RBC, 5/23  - Reorder CBC post-transfusion   - Continue to trend Hgb BID (goal > 7), platelets (goal > 50)        #VIK on CKD, likely prerenal d/t fluid status   # CKD (baseline Cr 1.3-1.4)  # urine retention s/p Boyd Catheter Placement by Urology in OSH  :: US renal 5/16: Nonobstructing 1 cm left renal calculus, no hydronephrosis  :: Baseline Cr 1.3-1.4  :: Cr 1.64>2.26>3.00  - will eventually need boyd removal and trial  - strict in/out  - tamsulosin 0.4 mg daily- holding iso NPO  -minimize nephrotoxic medications as able  - 2.5 L fluids given overnight (5/23)   - boyd removed 5/23/24 due to it being placed since 5/16  - urine electrolytes and urea     #Pancytopenia, most likely medication induced from pemfexy, resolved  #Thrombocytopenia, improved  :: c/f TTP, blood smear pending   ::   :: s/p pemfexy 5/14  :: d dimer 1521, fibrinogen >1000- no c/f dic  :: no schistocytes on smear  ::   :: filgrastim 480 mcg (5/21-5/22)  - 5/23 ANC 4130   - haptoglobin 400   - Platelets 55>38, ctm   - s/p platelet transfusion given evidence of bleeding 5/23/24     #Elevated INR  :: INR 4.9>6.3>2.4 >1.2  :: s/p  Vitamin K 10 mg  -CTM     #History Afib, HLD, CAD, PAD  #History DVT/PE on Warfarin  :: 5/21 TTE showing:  Left ventricular systolic function is hyperdynamic with a 70-75% estimated ejection fraction, Mildly elevated RVSP, Moderately dilated aortic root   - Holding home metoprolol 25 mg twice daily  - Holding warfarin, aspirin  - Continue home statin- holding iso npo     # Hospital-acquired Delirium  :: waxing and waning altered mental status  - apply delirium precautions  -hold home trazodone for insomnia      Antibiotics:: none  Dispo: SNF     F: Replete PRN  E: Keep Mg >2, phos >3  and K >4  N: NPO  A: PIVx2  O2: 2 L NC  DVT ppx: none iso thrombocytopenia  Code Status: DNR/DNI   Contact: Kirsten Arriola (Spouse)  [phone] (Work Phone)     Patient seen and discussed with Ernestine Mckeon MD MPH  PGY-1 Internal Medicine

## 2024-05-26 DIAGNOSIS — Z79.01 LONG TERM (CURRENT) USE OF ANTICOAGULANTS: ICD-10-CM

## 2024-05-26 DIAGNOSIS — I48.0 PAROXYSMAL ATRIAL FIBRILLATION (MULTI): ICD-10-CM

## 2024-05-26 LAB
ALBUMIN SERPL BCP-MCNC: 2.9 G/DL (ref 3.4–5)
ANION GAP SERPL CALC-SCNC: 18 MMOL/L (ref 10–20)
BACTERIA BLD CULT: NORMAL
BACTERIA BLD CULT: NORMAL
BASOPHILS # BLD AUTO: 0.02 X10*3/UL (ref 0–0.1)
BASOPHILS NFR BLD AUTO: 0.3 %
BUN SERPL-MCNC: 77 MG/DL (ref 6–23)
CALCIUM SERPL-MCNC: 8.4 MG/DL (ref 8.6–10.6)
CHLORIDE SERPL-SCNC: 109 MMOL/L (ref 98–107)
CO2 SERPL-SCNC: 24 MMOL/L (ref 21–32)
CREAT SERPL-MCNC: 3.56 MG/DL (ref 0.5–1.3)
EGFRCR SERPLBLD CKD-EPI 2021: 16 ML/MIN/1.73M*2
EOSINOPHIL # BLD AUTO: 0.25 X10*3/UL (ref 0–0.4)
EOSINOPHIL NFR BLD AUTO: 3.5 %
ERYTHROCYTE [DISTWIDTH] IN BLOOD BY AUTOMATED COUNT: 17.6 % (ref 11.5–14.5)
GLUCOSE SERPL-MCNC: 87 MG/DL (ref 74–99)
HCT VFR BLD AUTO: 27.2 % (ref 41–52)
HGB BLD-MCNC: 8.3 G/DL (ref 13.5–17.5)
IMM GRANULOCYTES # BLD AUTO: 0.25 X10*3/UL (ref 0–0.5)
IMM GRANULOCYTES NFR BLD AUTO: 3.5 % (ref 0–0.9)
INR PPP: 1.1 (ref 0.9–1.1)
LYMPHOCYTES # BLD AUTO: 0.4 X10*3/UL (ref 0.8–3)
LYMPHOCYTES NFR BLD AUTO: 5.6 %
MAGNESIUM SERPL-MCNC: 2.77 MG/DL (ref 1.6–2.4)
MCH RBC QN AUTO: 27.7 PG (ref 26–34)
MCHC RBC AUTO-ENTMCNC: 30.5 G/DL (ref 32–36)
MCV RBC AUTO: 91 FL (ref 80–100)
MONOCYTES # BLD AUTO: 0.19 X10*3/UL (ref 0.05–0.8)
MONOCYTES NFR BLD AUTO: 2.7 %
NEUTROPHILS # BLD AUTO: 5.99 X10*3/UL (ref 1.6–5.5)
NEUTROPHILS NFR BLD AUTO: 84.4 %
NRBC BLD-RTO: 0 /100 WBCS (ref 0–0)
PHOSPHATE SERPL-MCNC: 3.5 MG/DL (ref 2.5–4.9)
PLATELET # BLD AUTO: 42 X10*3/UL (ref 150–450)
POTASSIUM SERPL-SCNC: 3.2 MMOL/L (ref 3.5–5.3)
PROTHROMBIN TIME: 12.9 SECONDS (ref 9.8–12.8)
RBC # BLD AUTO: 3 X10*6/UL (ref 4.5–5.9)
SODIUM SERPL-SCNC: 148 MMOL/L (ref 136–145)
WBC # BLD AUTO: 7.1 X10*3/UL (ref 4.4–11.3)

## 2024-05-26 PROCEDURE — 99232 SBSQ HOSP IP/OBS MODERATE 35: CPT | Performed by: STUDENT IN AN ORGANIZED HEALTH CARE EDUCATION/TRAINING PROGRAM

## 2024-05-26 PROCEDURE — 85610 PROTHROMBIN TIME: CPT

## 2024-05-26 PROCEDURE — 99233 SBSQ HOSP IP/OBS HIGH 50: CPT | Performed by: STUDENT IN AN ORGANIZED HEALTH CARE EDUCATION/TRAINING PROGRAM

## 2024-05-26 PROCEDURE — 2500000001 HC RX 250 WO HCPCS SELF ADMINISTERED DRUGS (ALT 637 FOR MEDICARE OP): Performed by: STUDENT IN AN ORGANIZED HEALTH CARE EDUCATION/TRAINING PROGRAM

## 2024-05-26 PROCEDURE — 36415 COLL VENOUS BLD VENIPUNCTURE: CPT

## 2024-05-26 PROCEDURE — 2500000004 HC RX 250 GENERAL PHARMACY W/ HCPCS (ALT 636 FOR OP/ED): Performed by: STUDENT IN AN ORGANIZED HEALTH CARE EDUCATION/TRAINING PROGRAM

## 2024-05-26 PROCEDURE — 83735 ASSAY OF MAGNESIUM: CPT | Performed by: STUDENT IN AN ORGANIZED HEALTH CARE EDUCATION/TRAINING PROGRAM

## 2024-05-26 PROCEDURE — C9113 INJ PANTOPRAZOLE SODIUM, VIA: HCPCS | Performed by: STUDENT IN AN ORGANIZED HEALTH CARE EDUCATION/TRAINING PROGRAM

## 2024-05-26 PROCEDURE — 2500000002 HC RX 250 W HCPCS SELF ADMINISTERED DRUGS (ALT 637 FOR MEDICARE OP, ALT 636 FOR OP/ED): Performed by: STUDENT IN AN ORGANIZED HEALTH CARE EDUCATION/TRAINING PROGRAM

## 2024-05-26 PROCEDURE — 85025 COMPLETE CBC W/AUTO DIFF WBC: CPT | Performed by: STUDENT IN AN ORGANIZED HEALTH CARE EDUCATION/TRAINING PROGRAM

## 2024-05-26 PROCEDURE — 80069 RENAL FUNCTION PANEL: CPT

## 2024-05-26 PROCEDURE — 1200000003 HC ONCOLOGY  ROOM WITH TELEMETRY DAILY

## 2024-05-26 PROCEDURE — 2500000005 HC RX 250 GENERAL PHARMACY W/O HCPCS: Performed by: STUDENT IN AN ORGANIZED HEALTH CARE EDUCATION/TRAINING PROGRAM

## 2024-05-26 RX ORDER — POTASSIUM CHLORIDE 20 MEQ/1
40 TABLET, EXTENDED RELEASE ORAL ONCE
Status: COMPLETED | OUTPATIENT
Start: 2024-05-26 | End: 2024-05-26

## 2024-05-26 RX ADMIN — Medication 2 L/MIN: at 08:00

## 2024-05-26 RX ADMIN — Medication 5 MG: at 22:17

## 2024-05-26 RX ADMIN — PANTOPRAZOLE SODIUM 40 MG: 40 INJECTION, POWDER, FOR SOLUTION INTRAVENOUS at 08:11

## 2024-05-26 RX ADMIN — Medication 2 L/MIN: at 20:00

## 2024-05-26 RX ADMIN — SODIUM CHLORIDE, POTASSIUM CHLORIDE, SODIUM LACTATE AND CALCIUM CHLORIDE 1000 ML: 600; 310; 30; 20 INJECTION, SOLUTION INTRAVENOUS at 08:14

## 2024-05-26 RX ADMIN — NYSTATIN 1 APPLICATION: 100000 POWDER TOPICAL at 22:17

## 2024-05-26 RX ADMIN — NYSTATIN 1 APPLICATION: 100000 POWDER TOPICAL at 08:15

## 2024-05-26 RX ADMIN — PANTOPRAZOLE SODIUM 40 MG: 40 INJECTION, POWDER, FOR SOLUTION INTRAVENOUS at 22:18

## 2024-05-26 RX ADMIN — POTASSIUM CHLORIDE 40 MEQ: 1500 TABLET, EXTENDED RELEASE ORAL at 22:17

## 2024-05-26 RX ADMIN — POTASSIUM CHLORIDE 40 MEQ: 1500 TABLET, EXTENDED RELEASE ORAL at 16:37

## 2024-05-26 ASSESSMENT — COGNITIVE AND FUNCTIONAL STATUS - GENERAL
TURNING FROM BACK TO SIDE WHILE IN FLAT BAD: A LOT
DAILY ACTIVITIY SCORE: 9
MOVING FROM LYING ON BACK TO SITTING ON SIDE OF FLAT BED WITH BEDRAILS: A LOT
STANDING UP FROM CHAIR USING ARMS: A LOT
TOILETING: TOTAL
WALKING IN HOSPITAL ROOM: A LOT
DRESSING REGULAR UPPER BODY CLOTHING: TOTAL
EATING MEALS: A LITTLE
MOVING TO AND FROM BED TO CHAIR: A LOT
MOBILITY SCORE: 11
DRESSING REGULAR LOWER BODY CLOTHING: TOTAL
PERSONAL GROOMING: A LOT
HELP NEEDED FOR BATHING: TOTAL
CLIMB 3 TO 5 STEPS WITH RAILING: TOTAL

## 2024-05-26 ASSESSMENT — PAIN - FUNCTIONAL ASSESSMENT: PAIN_FUNCTIONAL_ASSESSMENT: 0-10

## 2024-05-26 ASSESSMENT — PAIN SCALES - GENERAL: PAINLEVEL_OUTOF10: 0 - NO PAIN

## 2024-05-26 NOTE — SIGNIFICANT EVENT
Rapid Response RN Note     05/25/24 2105   Onset Documentation   Rapid Response Initiated By Radar auto page   Location/Room [facility]  ([facility] 3893)   Pager Time 2100   Arrival Time 2105   Event End Time 2120   Primary Reason for Call Radar auto page  (score 7)     Rapid response RN at bedside for RADAR score 7 due to the following VS: T 36.7 °Celsius; ; RR 22; BP 99/69; SPO2 94%.     Reviewed above VS with bedside RN.  Per report, patient had received Metoprolol IV earlier in the evening for HR.  Primary [facility]iny service updated on above RADAR alert by bedside RN.  No new orders at this time.  Staff to page rapid response for any concerns or acute change in condition/VS.

## 2024-05-26 NOTE — PROGRESS NOTES
Subjective   NAEON. Feeling much better today than yesterday/day prior. No complaints today. Eager to eat/drink.         Objective     Vitals:  Vitals:    05/26/24 1327   BP: 120/69   Pulse: 110   Resp: 20   Temp: 36.2 °C (97.2 °F)   SpO2: 98%       I/O last 3 completed shifts:  In: 130 (1.2 mL/kg) [NG/GT:30; IV Piggyback:100]  Out: 2185 (20.1 mL/kg) [Urine:1300 (0.3 mL/kg/hr); Emesis/NG output:885]  Weight: 108.8 kg   I/O this shift:  In: 1000 [IV Piggyback:1000]  Out: 450 [Urine:100; Emesis/NG output:350]    Physical exam:  Constitutional: Ill appearing  HEENT: Normocephalic, atraumatic. PERRL. EOMI. No cervical lymphadenopathy.  Respiratory: hyperresonant breath sounds on left  Cardiovascular: Afib RVR, no murmurs noted  Abdominal: Soft, nontender, nondistended  Skin: contusion b/l hand and L arm. LLE 2+ edema, RLE no edema. LLE erythema outlined  Psych: Appropriate mood and affect.    Medications:  [Held by provider] ascorbic acid, 1,000 mg, oral, Daily  [Held by provider] aspirin, 81 mg, oral, Nightly  [Held by provider] ergocalciferol, 1,250 mcg, oral, Every Sunday  nystatin, 1 Application, Topical, BID  oxygen, , inhalation, Continuous - Inhalation  pantoprazole, 40 mg, intravenous, BID  perflutren protein A microsphere, 0.5 mL, intravenous, Once in imaging  [Held by provider] rosuvastatin, 10 mg, oral, Nightly  sulfur hexafluoride microsphr, 2 mL, intravenous, Once in imaging  [Held by provider] tamsulosin, 0.4 mg, oral, Daily  [Held by provider] traZODone, 100 mg, oral, Nightly         PRN medications: acetaminophen, albuterol, carboxymethylcellulose, diclofenac sodium, melatonin, [Held by provider] nitroglycerin, ondansetron **OR** ondansetron, oxygen    Labs:  Results for orders placed or performed during the hospital encounter of 05/19/24 (from the past 24 hour(s))   Magnesium   Result Value Ref Range    Magnesium 2.77 (H) 1.60 - 2.40 mg/dL   CBC and Auto Differential   Result Value Ref Range    WBC 7.1  4.4 - 11.3 x10*3/uL    nRBC 0.0 0.0 - 0.0 /100 WBCs    RBC 3.00 (L) 4.50 - 5.90 x10*6/uL    Hemoglobin 8.3 (L) 13.5 - 17.5 g/dL    Hematocrit 27.2 (L) 41.0 - 52.0 %    MCV 91 80 - 100 fL    MCH 27.7 26.0 - 34.0 pg    MCHC 30.5 (L) 32.0 - 36.0 g/dL    RDW 17.6 (H) 11.5 - 14.5 %    Platelets 42 (L) 150 - 450 x10*3/uL    Neutrophils % 84.4 40.0 - 80.0 %    Immature Granulocytes %, Automated 3.5 (H) 0.0 - 0.9 %    Lymphocytes % 5.6 13.0 - 44.0 %    Monocytes % 2.7 2.0 - 10.0 %    Eosinophils % 3.5 0.0 - 6.0 %    Basophils % 0.3 0.0 - 2.0 %    Neutrophils Absolute 5.99 (H) 1.60 - 5.50 x10*3/uL    Immature Granulocytes Absolute, Automated 0.25 0.00 - 0.50 x10*3/uL    Lymphocytes Absolute 0.40 (L) 0.80 - 3.00 x10*3/uL    Monocytes Absolute 0.19 0.05 - 0.80 x10*3/uL    Eosinophils Absolute 0.25 0.00 - 0.40 x10*3/uL    Basophils Absolute 0.02 0.00 - 0.10 x10*3/uL   Renal Function Panel   Result Value Ref Range    Glucose 87 74 - 99 mg/dL    Sodium 148 (H) 136 - 145 mmol/L    Potassium 3.2 (L) 3.5 - 5.3 mmol/L    Chloride 109 (H) 98 - 107 mmol/L    Bicarbonate 24 21 - 32 mmol/L    Anion Gap 18 10 - 20 mmol/L    Urea Nitrogen 77 (H) 6 - 23 mg/dL    Creatinine 3.56 (H) 0.50 - 1.30 mg/dL    eGFR 16 (L) >60 mL/min/1.73m*2    Calcium 8.4 (L) 8.6 - 10.6 mg/dL    Phosphorus 3.5 2.5 - 4.9 mg/dL    Albumin 2.9 (L) 3.4 - 5.0 g/dL   Protime-INR   Result Value Ref Range    Protime 12.9 (H) 9.8 - 12.8 seconds    INR 1.1 0.9 - 1.1       Imaging:  XR abdomen 1 view    Result Date: 5/25/2024  Interpreted By:  Marcie Shah, STUDY: XR ABDOMEN 1 VIEW; 5/25/2024 5:45 pm   INDICATION: Signs/Symptoms:ngt placed.   COMPARISON: Radiograph dated 05/25/2024, 2:33 p.m.   ACCESSION NUMBER(S): [id number]   ORDERING CLINICIAN: ALEXANDRA AVILA   FINDINGS: Single-view of the abdomen. The pelvis is not included. Enteric tube is in place with the tip projecting over the stomach. Positive contrast is identified in the gastric fundus. Prominent loops  of bowel throughout the visualized upper abdomen. Nonobstructive bowel gas pattern.  Limited evaluation of pneumoperitoneum on supine imaging, however no gross evidence of free air is noted.   Extensive consolidation in visualized portion of the left lung.   Osseous structures demonstrate no acute bony changes.       1.  Enteric tube projecting over the proximal stomach. 2. Again seen multiple prominent loops of bowel throughout the upper abdomen. Correlation with ileus. Recommend follow-up radiograph.   Signed by: Marcie Rodriguez 5/25/2024 5:54 PM Dictation workstation:   [id number]    XR abdomen 1 view    Result Date: 5/25/2024  Interpreted By:  Marcie Shah, STUDY: XR ABDOMEN 1 VIEW; 5/25/2024 3:06 pm   INDICATION: Signs/Symptoms:distended abdomen, assess for dilated bowel loops.   COMPARISON: Radiograph dated 05/21/2024   ACCESSION NUMBER(S): [id number]   ORDERING CLINICIAN: ALEXANDRA AVILA   FINDINGS: Views of the abdomen and pelvis. What is presumed to be enteric tube is projecting over the lower mediastinum. Positive contrast is identified in the expected location of the stomach. There is also positive contrast throughout the loops of bowel in the lower abdomen. Prominent loops of colon. Limited evaluation of pneumoperitoneum on supine imaging, however no gross evidence of free air is noted.   Consolidative opacities in left lung.   Osseous structures demonstrate no acute bony changes.       1.  Prominent loops of colon which can be due to ileus. Recommend follow-up radiograph.   Signed by: Marcie Rodriguez 5/25/2024 5:33 PM Dictation workstation:   [id number]          Assessment/Plan   88M PMHx PAD, afib/flutter, hx DVT on warfarin, HTN, CAD s/p stent, mitral regurg, HLD, polycythemia vera, managed by Dr. 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 Presented to [place] 5/14 with abdominal pain, fever, diarrhea, urinary retention c/f sepsis presumed 2/2 PNA; CT CAP showed known LLL collapse 2/2 mesothelioma, new post L chest wall invasion, and c/f L pulmonary trunk tumor thrombus.  5/23 developed shock and acute hypoxic resp failure requiring MICU transfer for pressors and NIV (airvo), thought 2/2 GI bleeding with melena and coffee ground drainage from NGT. Quickly weaned off pressors, s/p flex sig with blood in rectum but no active bleed. Now transferred back to floor for further mgmt.     Updates 5/26  -Soft diet  -passed SLP eval  - Hold off EGD since coffee ground emesis resolved  - bladder scan  - urine electrolytes pre-renal, -2.2L length of stay, hypernatremia with prolonged NPO all suggest pre-renal hypovolemic VIK. Will give 1L, encourage PO intake, clamp NG tube  - kub shows distended bowel loops likely ileus, will monitor for improvement with PO diet. If becoming nauseous or developing vomiting, can unclamp NGT and place to LIWS. If doing this, then give additional 500cc LR  - Abx (vanc/filiberto/natasha) stopped 5/25    #coffee grounds via NGT (placed 5/21), improved   #melena likely d/t GI bleed  :: KUB 5/21, showing moderate distention of stomach with nonspecific predominantly fluid-filled bowel pattern.  :: 5/20-5/21 patient emesis that is watery, dark brown, malodorous   :: did initially present w/ diarrhea could have been chemo induced vs stool ball  :: INR 4.9>6.3>2.4 >1.2  :: s/p Vitamin K 10 mg  :: KUB w/ dilated bowel loops   - Soft diet, encouraged patient to go slow  - holding ferrous sulfate    - pantoprazole 40 bid   - GI consulted, appreciate recs  -s/p flex sig with no evidence of active bleed     #Pneumonia, Right lower lobe ill-defined patchy infiltrate  :: Resp Cult salivary contamination  :: MRSA nares: negative  :: Strep/Legionella antigens: negative  :: CXR 5/21 showing persistent patchy right  "infrahilar airspace opacities    :: 5/22 CT CAP w/o contrast: Interval improvement of the right lower lobe patchy infiltrate  :: s/p Azithro (5/16-5/18)  :: s/p vanc (5/16-5/18) (5/20-5/22) and zosyn (5/16-5/18) (5/20-22)  - meropenem (5/19-5/20) (5/22-25) and micafungin (5/22-25)  -Abx stopped 5/25 given prolonged course with improvement/decline independent of abx, do not suspect active infection currently. Restart if decompensating     #Tachypnea   #elevated A-a gradient   #AHRF, improved  :: multifactorial, like d/t shunt secondary to malignancy vs pneumonia vs v/q mismatch   :: RR 23-44   :: 5/22 ABG: pH 7.53, pCO2 31, pO2 109, Lactate 2.2, FiO2 40.  - On 3L nasal cannula with intermittent AIRVO (5/22-) to assist with work of breathing   - gradually wean supplemental O2 for SpO2 goal >90%   - continue abx regimen  - CTM      # Worsening mesothelioma  # tumor Thrombus  #Mesothelioma with Loculated Pleural Effusions   #Left Hilar Mass, Left Pulmonary Trunk Mass  #History JOVANNI, Pulmonary Embolism  :: primary oncologist Dr. Galvan  :: s/p L VATS w/ decort 5/2022, XRT 9-10/22  :: s/p \"half-dose\" pemfexy on 5/14  :: CT chest 5/18 with significant collapse of left lung, loculated pleural effusions consistent with patient's known mesothelioma.  Also shows ill-defined patchy infiltrate in right lower lobe.  Shows left hilar mass with extension into left pulmonary trunk likely tumor thrombus   - Holding warfarin, continue to monitor   - Dr. Henderson recommends transitioning to Eliquis on discharge, deferring anticoagulation iso thrombocytopenia. Consider 2.5mg BID vs 5mg BID on discharge  - continuing protocol w/ 1 mg folic acid daily     #Fever  #Sepsis most likely d/t pneumonia  :: at OSH met SIRS criteria (febrile, tachypnea)  :: UA 5/18 with small LE, 3+ bacteria, urine cx 5/21 negative  :: Resp Cult salivary contamination  :: MRSA nares: negative  :: Strep/Legionella antigens: negative  :: c diff and enteric stool panel: " negative  :: BC 5/16 NGTD, repeat BC 5/20 NGTD  :: CRP 30.46>42.36  :: CXR 5/21 showing persistent patchy right infrahilar airspace opacities    :: s/p Azithro (5/16-5/18)  :: asymmetric warmth, erythema, edema noted in left lower extremity   :: s/p vanc (5/16-5/18) (5/20-5/25) and zosyn (5/16-5/18) (5/20-22)  :: 5/22 CT CAP w/o contrast: Interval improvement of the right lower lobe patchy infiltrate, Interval flattening of the inferior vena cava which may be seen with hypovolemia, Improved proximal sigmoid diverticulitis.  - repeat blood cultures drawn 5/22, NGTD   - meropenem (5/19-5/20) (5/22-5/25) and micafungin (5/22-5/25)      #Lower leg skin changes likely d/t cellulitis   - improved margins of left lower extremity skin changes, will ctm      #anemia, improved  - Likely 2/2 GIB noted this admission  :: melena and coffee grounds via NGT noted 5/22  :: Hgb initially 10, decreased to 6.9 on 5/23, now stable since 5/24  - S/p 1u pRBC 5/23  - Continue to trend Hgb BID (goal > 7), platelets goal > 10. Platelet goal liberalized as no active bleeding, if hgb decreasing or bleeding clinically then transfuse to goal 50        #VIK on CKD, likely prerenal d/t fluid status   # CKD (baseline Cr 1.3-1.4)  # urine retention s/p Boyd Catheter Placement by Urology in OSH  :: US renal 5/16: Nonobstructing 1 cm left renal calculus, no hydronephrosis  :: Baseline Cr 1.3-1.4  :: Cr 1.64>2.26>3.00  - will eventually need boyd removal and trial  - strict in/out  - tamsulosin 0.4 mg daily- holding iso NPO  -minimize nephrotoxic medications as able  - 2.5 L fluids given overnight (5/23)   - boyd removed 5/23/24 due to it being placed since 5/16  - FENa 0.8%, -2.2L LOS, likely hypovolemic. Give 1L LR 5/26, encourage PO intake     #Pancytopenia, most likely medication induced from pemfexy, resolved  #Thrombocytopenia, improved  :: c/f TTP, blood smear pending   ::   :: s/p pemfexy 5/14  :: d dimer 1521, fibrinogen >1000- no  c/f dic  :: no schistocytes on smear  ::   :: filgrastim 480 mcg (5/21-5/22)  - 5/23 ANC 4130   - haptoglobin 400   - Platelets 55>38, ctm. Goal >10  - s/p 1u platelets 5/23 (was bleeding at the time)     #Elevated INR  :: INR 4.9>6.3>2.4 >1.2  :: s/p Vitamin K 10 mg  -CTM     #History Afib, HLD, CAD, PAD  #History DVT/PE on Warfarin  :: 5/21 TTE showing:  Left ventricular systolic function is hyperdynamic with a 70-75% estimated ejection fraction, Mildly elevated RVSP, Moderately dilated aortic root   - Holding home metoprolol 25 mg twice daily  - Holding warfarin, aspirin  - Continue home statin- holding iso npo     # Hospital-acquired Delirium  :: waxing and waning altered mental status  - apply delirium precautions  -hold home trazodone for insomnia      Antibiotics:: none  Dispo: SNF     F: 1L LR  E: Keep Mg >2, phos >3  and K >4  N: NPO  A: PIVx2  O2: 2 L NC  DVT ppx: none iso thrombocytopenia  Code Status: DNR/DNI   Contact: Kirsten Arriola (Spouse)  [phone] (Work Phone)     Patient seen and discussed with Dr. Marino

## 2024-05-26 NOTE — CARE PLAN
The patient's goals for the shift include  safety    The clinical goals for the shift include HDS    Problem: Fall/Injury  Goal: Not fall by end of shift  Outcome: Progressing  Goal: Be free from injury by end of the shift  Outcome: Progressing  Goal: Verbalize understanding of personal risk factors for fall in the hospital  Outcome: Progressing  Goal: Verbalize understanding of risk factor reduction measures to prevent injury from fall in the home  Outcome: Progressing  Goal: Use assistive devices by end of the shift  Outcome: Progressing  Goal: Pace activities to prevent fatigue by end of the shift  Outcome: Progressing     Problem: Skin  Goal: Prevent/manage excess moisture  Outcome: Progressing  Goal: Prevent/minimize sheer/friction injuries  Outcome: Progressing  Goal: Promote/optimize nutrition  Outcome: Progressing  Goal: Promote skin healing  Outcome: Progressing

## 2024-05-26 NOTE — SIGNIFICANT EVENT
Rapid Response RN Note    Rapid response RN for RADAR score 7 due to the recent VS listed below:     Vitals:    05/25/24 1819 05/25/24 2000 05/25/24 2043 05/26/24 0106   BP:   99/69 102/60   BP Location:   Right arm    Patient Position:   Lying    Pulse: 108  (!) 119 (!) 120   Resp:   22 24   Temp:   36.7 °C (98.1 °F) 36.3 °C (97.3 °F)   TempSrc:   Temporal Temporal   SpO2:  98% 94% 98%   Weight:       Height:            Reviewed above VS with bedside RN via phone.  VS within patient's current trends.  No interventions by rapid response team indicated at this time.  Pt in A fib. Primary team aware of vital signs.  Staff to page rapid response for any concerns or acute change in condition/VS.

## 2024-05-26 NOTE — PROGRESS NOTES
"[facility]  [facility]  CONSULT FOLLOW-UP     Reason For Consult  Coffee ground per NG, melena    SUBJECTIVE     No acute events overnight. Patient had 5 loose, watery, greenish bowel movements in the last 24 hours.     Per patient and his wife, his abdomen does not look distended to them and this is his baseline. KUB done yesterday showed multiple dilated loops of bowel concerning for ileus, but he is not nauseated and he is not having any emesis either. And he is having regular bowel movements.     NG is connected to suction, with only a small amount drained from the stomach. Patient is feeling hungry and says he can eat more, though he is thankful for ice chips.     Hemoglobin is stable when compared to yesterday.     VIK worsening. Likely pre-renal VIK in the background of dehydration from GI losses and poor PO intake/NPO status.    EXAM     Last Recorded Vitals  Blood pressure 120/69, pulse 110, temperature 36.2 °C (97.2 °F), temperature source Temporal, resp. rate 20, height 1.727 m (5' 7.99\"), weight 109 kg (239 lb 12.8 oz), SpO2 98%.      Intake/Output Summary (Last 24 hours) at 5/26/2024 1353  Last data filed at 5/26/2024 1327  Gross per 24 hour   Intake 1030 ml   Output 1710 ml   Net -680 ml          Physical Exam  General: chronically ill appearing elderly male   HEENT: no pallor, no icterus, NG tube with green bilious fluid   Respiratory: CTA anteriorly   Cardiovascular: tachycardic   Abdomen: Soft, nontender, mildly distended (?obese)  Neuro: alert and oriented X3, strength and sensations grossly normal     OBJECTIVE                                                                              Medications             Current Facility-Administered Medications:     acetaminophen (Ofirmev) injection 1,000 mg, 1,000 mg, intravenous, q6h PRN, Rhianna Holland MD, Stopped at 05/23/24 0303    albuterol 2.5 mg /3 mL (0.083 %) nebulizer solution 2.5 mg, 2.5 mg, " nebulization, q6h PRN, Rhianna Holland MD    [Held by provider] ascorbic acid (Vitamin C) tablet 1,000 mg, 1,000 mg, oral, Daily, Rhianna Holland MD, 1,000 mg at 05/21/24 0934    [Held by provider] aspirin EC tablet 81 mg, 81 mg, oral, Nightly, Rhianna Holland MD, 81 mg at 05/20/24 2043    carboxymethylcellulose (Refresh Celluvisc) 1 % ophthalmic solution 1 drop, 1 drop, Both Eyes, PRN, Rhianna Holland MD    diclofenac sodium (Voltaren) 1 % gel 4 g, 4 g, Topical, 4x daily PRN, Rhianna Holland MD    [Held by provider] ergocalciferol (Vitamin D-2) capsule 1,250 mcg, 1,250 mcg, oral, Every Sunday, Rhianna Holland MD    melatonin tablet 5 mg, 5 mg, oral, Nightly PRN, Rhianna Holland MD    [Held by provider] nitroglycerin (Nitrostat) SL tablet 0.4 mg, 0.4 mg, sublingual, q5 min PRN, Rhianna Holland MD    nystatin (Mycostatin) 100,000 unit/gram powder 1 Application, 1 Application, Topical, BID, Rhianna Holland MD, 1 Application at 05/26/24 0815    ondansetron (Zofran) tablet 4 mg, 4 mg, oral, q8h PRN **OR** ondansetron (Zofran) injection 4 mg, 4 mg, intravenous, q8h PRN, Rhianna Holland MD, 4 mg at 05/21/24 0930    oxygen (O2) therapy, , inhalation, Continuous - Inhalation, Rhianna Holland MD, 2 L/min at 05/26/24 0800    oxygen (O2) therapy, , inhalation, Continuous PRN - O2/gases, Rhianna Holland MD, 4 L/min at 05/24/24 0000    pantoprazole (ProtoNix) injection 40 mg, 40 mg, intravenous, BID, Rhianna Holland MD, 40 mg at 05/26/24 0811    perflutren protein A microsphere (Optison) injection 0.5 mL, 0.5 mL, intravenous, Once in imaging, Rhianna Holland MD    [Held by provider] rosuvastatin (Crestor) tablet 10 mg, 10 mg, oral, Nightly, Rhianna Holland MD, 10 mg at 05/20/24 2042    sulfur hexafluoride microsphr (Lumason) injection 24.28 mg, 2 mL, intravenous, Once in imaging, Rhianna Holland MD    [Held by provider] tamsulosin (Flomax) 24 hr capsule 0.4 mg, 0.4 mg, oral, Daily, Rhianna Holland MD, 0.4 mg at  05/21/24 0933    [Held by provider] traZODone (Desyrel) tablet 100 mg, 100 mg, oral, Nightly, Rhianna Holland MD, 100 mg at 05/20/24 5510                                                                            Labs     Results for orders placed or performed during the hospital encounter of 05/19/24 (from the past 24 hour(s))   Urine electrolytes   Result Value Ref Range    Sodium, Urine Random 33 mmol/L    Sodium/Creatinine Ratio 31 Not established. mmol/g Creat    Potassium, Urine Random 23 mmol/L    Potassium/Creatinine Ratio 21 Not established mmol/g Creat    Chloride, Urine Random 15 mmol/L    Chloride/Creatinine Ratio 14 (L) 23 - 275 mmol/g creat    Creatinine, Urine Random 108.0 20.0 - 370.0 mg/dL   Urea Nitrogen, Urine Random   Result Value Ref Range    Urea Nitrogen, Urine Random 775 mg/dL    Creatinine, Urine Random 108.2 20.0 - 370.0 mg/dL    Urea Nitrogen/Creatinine Ratio 7.2 Not established. g/g creat   Magnesium   Result Value Ref Range    Magnesium 2.77 (H) 1.60 - 2.40 mg/dL   CBC and Auto Differential   Result Value Ref Range    WBC 7.1 4.4 - 11.3 x10*3/uL    nRBC 0.0 0.0 - 0.0 /100 WBCs    RBC 3.00 (L) 4.50 - 5.90 x10*6/uL    Hemoglobin 8.3 (L) 13.5 - 17.5 g/dL    Hematocrit 27.2 (L) 41.0 - 52.0 %    MCV 91 80 - 100 fL    MCH 27.7 26.0 - 34.0 pg    MCHC 30.5 (L) 32.0 - 36.0 g/dL    RDW 17.6 (H) 11.5 - 14.5 %    Platelets 42 (L) 150 - 450 x10*3/uL    Neutrophils % 84.4 40.0 - 80.0 %    Immature Granulocytes %, Automated 3.5 (H) 0.0 - 0.9 %    Lymphocytes % 5.6 13.0 - 44.0 %    Monocytes % 2.7 2.0 - 10.0 %    Eosinophils % 3.5 0.0 - 6.0 %    Basophils % 0.3 0.0 - 2.0 %    Neutrophils Absolute 5.99 (H) 1.60 - 5.50 x10*3/uL    Immature Granulocytes Absolute, Automated 0.25 0.00 - 0.50 x10*3/uL    Lymphocytes Absolute 0.40 (L) 0.80 - 3.00 x10*3/uL    Monocytes Absolute 0.19 0.05 - 0.80 x10*3/uL    Eosinophils Absolute 0.25 0.00 - 0.40 x10*3/uL    Basophils Absolute 0.02 0.00 - 0.10 x10*3/uL   Renal  Function Panel   Result Value Ref Range    Glucose 87 74 - 99 mg/dL    Sodium 148 (H) 136 - 145 mmol/L    Potassium 3.2 (L) 3.5 - 5.3 mmol/L    Chloride 109 (H) 98 - 107 mmol/L    Bicarbonate 24 21 - 32 mmol/L    Anion Gap 18 10 - 20 mmol/L    Urea Nitrogen 77 (H) 6 - 23 mg/dL    Creatinine 3.56 (H) 0.50 - 1.30 mg/dL    eGFR 16 (L) >60 mL/min/1.73m*2    Calcium 8.4 (L) 8.6 - 10.6 mg/dL    Phosphorus 3.5 2.5 - 4.9 mg/dL    Albumin 2.9 (L) 3.4 - 5.0 g/dL   Protime-INR   Result Value Ref Range    Protime 12.9 (H) 9.8 - 12.8 seconds    INR 1.1 0.9 - 1.1                                                                            Imaging   CT C/A/P without contrast 5/22:  IMPRESSION:  Chest  1. Interval improvement of the right lower lobe patchy infiltrate.  2. Again seen left lung collapse, left pleural thickening with  invasion into the left posterior chest wall, and small left loculated  pleural effusion consistent with patient's known mesothelioma and is  unchanged when compared to prior CT from 05/18/2024.  3. Unchanged prominent mediastinal and perihilar lymph nodes.  4. Previously noted left pulmonary artery thrombus is not well  evaluated on this exam due to beam hardening artifact, positioning of  patient's arms, and lack of intravenous contrast.    AXR 5/21:  IMPRESSION:  1.  The enteric tube is positioned within the gastric fundus  2. Advancement of the tube would be recommended for more optimal  positioning  3. Predominantly fluid-filled small bowel and colon pattern  4. Opacification of the visualized left thorax and multiple air  bronchograms within the left lung     AXR 5/21  IMPRESSION:  1.  The stomach is moderately distended with air otherwise,  nonspecific predominantly fluid-filled bowel pattern     CT Chest/ abd/pelvis 5/18:  IMPRESSION:  CHEST:  1. Significant collapse of the left lung with heterogenous  appearance, pleural thickening and small loculated pleural fluid  measuring 7.8 cm consistent with  the patient's known mesothelioma  which have worsened from the prior CT scan dated 06/14/2023 and  grossly unchanged from 12/10/2023 unenhanced CT scan allowing for  differences in techniques. Focus of new left posterior chest wall  invasion noted at the level of 9th 10th and 10th 11th rib spaces.  2. Right lower lobe ill-defined patchy infiltrate measuring 2.3 x 1.4  cm. Trace right-sided pleural effusion with surrounding atelectasis.  3. Redemonstrated left hilar ill-defined enhancing fullness appears  extending to the left pulmonary trunk likely tumor thrombus and felt  less likely bland thrombus which has significantly worsened from  06/14/2023 CT scan.  4. Mildly enlarged multiple mediastinal lymph nodes in the largest in  the subcarinal region measuring 1.6 cm, grossly unchanged from prior.      ABDOMEN-PELVIS:  1. Proximal sigmoid diverticulosis with mild wall thickening could be  related to episodes of underlying mild uncomplicated diverticulitis  2. Other ancillary findings are unchanged.     CT Abd/pelvis 5/17:  IMPRESSION:  1.  Subtle inflammatory change along the proximal sigmoid colon which  may represent a low-grade acute diverticulitis.  No definite  perforation or abscess.  2.  Complete collapse and consolidation of the left lung with a  loculated effusion at the left lung base, unchanged compared to the  prior chest CT and likely consistent with patient's reported  mesothelioma, possibly chronic post treatment change.  3.  Cardiomegaly with chronic changes suggesting COPD.  4.  Cholelithiasis.  5.  Moderate bilateral renal atrophy with nonobstructing 6 mm left  renal calculus.  6.  Additional chronic changes as described.                                                                         GI Procedures   Flex sig 5/24:  Impression  Blood present in the rectum  Scattered diverticulosis in the sigmoid colon  The sigmoid colon, rectosigmoid and rectum appeared normal.  Green bilious stool coming  more proximally, without any blood, blood clots or hematin.  Likely bleeding from rectum or sigmoid, no evidence of active bleeding at this time    ASSESSMENT / PLAN     ASSESSMENT/PLAN:  88 y.o. 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 Gastroenterology is consulted for concern for GIB.      Patient does have worsening coagulopathy and thrombocytopenia. Likely in the setting of ongoing fevers/ sepsis. Ddx: mucosal oozing in the setting of coagulopathy, vascular lesion, gastritis/ esophagitis/duodenitis, PUD, etc.. Reassuringly today without any worsening hemodynamics, and NGT without active bleeding or coffee grounds,  though patient is now on HFNC for resp support. Improvement in coagulopathy though worsening thrombocytopenia.      Pt underwent flex sig on 5/24/2024 with evidence of blood in rectum, though no source or active site was seen. More proximal to rectum there was brown stool and bile, suggesting source is not upper. Will continue to closely monitor.      #coffee grounds via NGT  #melena    RECS:  Hemoglobin is stable, and no signs/symptoms of ileus. As such, consider clamping trial and advancing diet to CLD  No emergent need for EGD at this time, will wait until he is clinically better (or sooner if he starts having melena, coffee ground emesis, etc). Can follow with GI outpatient   Continue IV PPI BID  Please check C.diff given multiple loose bowel movements   Continue daily CBC check and aim for hemoglobin above 7 and platelets above 50    Patient was seen and discussed with GI attending, Dr. Kaylen Valenzuela.     Thank you for this consult. Gastroenterology will continue to follow at this time.     -During weekday hours of 7am-5pm please do not hesitate to contact me on Epic Chat  or page [phone] if there are any further questions between the weekday hours of 7 AM - 5 PM.   -After hours, on weekends, and on holidays, please [place] the [place] [phone]. Thank you.    Zaira Mcghee MD MPH   GI Fellow   [facility]

## 2024-05-26 NOTE — CARE PLAN
The patient's goals for the shift include  safety    The clinical goals for the shift include HDS      Problem: Fall/Injury  Goal: Not fall by end of shift  5/25/2024 2012 by Shakila Hemphill RN  Outcome: Progressing  5/25/2024 2011 by Shakila Hemphill RN  Outcome: Progressing  5/25/2024 2010 by Shakila Hemphill RN  Outcome: Progressing  Goal: Be free from injury by end of the shift  5/25/2024 2012 by Shakila Hemphill RN  Outcome: Progressing  5/25/2024 2011 by Shakila Hemphill RN  Outcome: Progressing  5/25/2024 2010 by Shakila Hemphill RN  Outcome: Progressing  Goal: Verbalize understanding of personal risk factors for fall in the hospital  5/25/2024 2012 by Shakila Hemphill RN  Outcome: Progressing  5/25/2024 2011 by Shakila Hemphill RN  Outcome: Progressing  5/25/2024 2010 by Shakila Hemphill RN  Outcome: Progressing  Goal: Verbalize understanding of risk factor reduction measures to prevent injury from fall in the home  5/25/2024 2012 by Shakila eHmphill RN  Outcome: Progressing  5/25/2024 2011 by Shakila Hemphill RN  Outcome: Progressing  5/25/2024 2010 by Shakila Hemphill RN  Outcome: Progressing  Goal: Use assistive devices by end of the shift  5/25/2024 2012 by Shakila Hemphill RN  Outcome: Progressing  5/25/2024 2011 by Shakila Hemphill RN  Outcome: Progressing  5/25/2024 2010 by Shakila Hemphill RN  Outcome: Progressing  Goal: Pace activities to prevent fatigue by end of the shift  5/25/2024 2012 by Shakila Hemphill RN  Outcome: Progressing  5/25/2024 2011 by Shakila Hemphill RN  Outcome: Progressing  5/25/2024 2010 by Shakila Hemphill RN  Outcome: Progressing     Problem: Fall/Injury  Goal: Be free from injury by end of the shift  5/25/2024 2012 by Shakila Hemphill RN  Outcome: Progressing  5/25/2024 2011 by Shakila Hemphill RN  Outcome: Progressing  5/25/2024 2010 by Shakila Hemphill RN  Outcome: Progressing

## 2024-05-26 NOTE — CARE PLAN
The patient's goals for the shift include  no nausea, vomiting    The clinical goals for the shift include HDS    Problem: Fall/Injury  Goal: Not fall by end of shift  Outcome: Progressing  Goal: Be free from injury by end of the shift  Outcome: Progressing  Goal: Verbalize understanding of personal risk factors for fall in the hospital  Outcome: Progressing  Goal: Verbalize understanding of risk factor reduction measures to prevent injury from fall in the home  Outcome: Progressing  Goal: Use assistive devices by end of the shift  Outcome: Progressing  Goal: Pace activities to prevent fatigue by end of the shift  Outcome: Progressing     Problem: Skin  Goal: Prevent/manage excess moisture  Outcome: Progressing  Flowsheets (Taken 5/26/2024 1039)  Prevent/manage excess moisture:   Cleanse incontinence/protect with barrier cream   Follow provider orders for dressing changes   Moisturize dry skin   Monitor for/manage infection if present   Use wicking fabric (obtain order)  Goal: Prevent/minimize sheer/friction injuries  Outcome: Progressing  Flowsheets (Taken 5/26/2024 1039)  Prevent/minimize sheer/friction injuries:   Complete micro-shifts as needed if patient unable. Adjust patient position to relieve pressure points, not a full turn   HOB 30 degrees or less   Increase activity/out of bed for meals   Turn/reposition every 2 hours/use positioning/transfer devices   Use pull sheet   Utilize specialty bed per algorithm  Goal: Promote/optimize nutrition  Outcome: Progressing  Flowsheets (Taken 5/26/2024 1039)  Promote/optimize nutrition:   Assist with feeding   Consume > 50% meals/supplements   Discuss with provider if NPO > 2 days   Monitor/record intake including meals   Offer water/supplements/favorite foods   Reassess MST if dietician not consulted  Goal: Promote skin healing  Outcome: Progressing  Flowsheets (Taken 5/26/2024 1039)  Promote skin healing:   Assess skin/pad under line(s)/device(s)   Ensure correct  size (line/device) and apply per  instructions   Protective dressings over bony prominences   Rotate device position/do not position patient on device   Turn/reposition every 2 hours/use positioning/transfer devices

## 2024-05-26 NOTE — PROGRESS NOTES
"[facility]  [facility] [place]  CONSULT FOLLOW-UP     Reason For Consult  Coffee ground per NG, melena    SUBJECTIVE     Had flex sig yesterday. No bleeding source identified. Had a few more bowel movements after that, none were bloody or melenic. Abdomen is quite distended.     Hemoglobin is stable at 8.2 (same as yesterday), but creatinine is continuing to get worse (now 3.62 from 3.55).     EXAM     Last Recorded Vitals  Blood pressure 102/60, pulse (!) 120, temperature 36.3 °C (97.3 °F), temperature source Temporal, resp. rate 24, height 1.727 m (5' 7.99\"), weight 115 kg (253 lb 4.9 oz), SpO2 98%.      Intake/Output Summary (Last 24 hours) at 5/26/2024 0348  Last data filed at 5/25/2024 1819  Gross per 24 hour   Intake 30 ml   Output 935 ml   Net -905 ml          Physical Exam  General: chronically ill appearing elderly male   HEENT: no pallor, no icterus, NG tube with green bilious fluid   Respiratory: CTA anteriorly   Cardiovascular: tachycardic   Abdomen: Soft, nontender, distended (?obese)  Neuro: alert and oriented X3, strength and sensations grossly normal     OBJECTIVE                                                                              Medications             Current Facility-Administered Medications:     acetaminophen (Ofirmev) injection 1,000 mg, 1,000 mg, intravenous, q6h PRN, Rhianna Holland MD, Stopped at 05/23/24 0303    albuterol 2.5 mg /3 mL (0.083 %) nebulizer solution 2.5 mg, 2.5 mg, nebulization, q6h PRN, Rhianna Holland MD    [Held by provider] ascorbic acid (Vitamin C) tablet 1,000 mg, 1,000 mg, oral, Daily, Rhianna Holland MD, 1,000 mg at 05/21/24 0934    [Held by provider] aspirin EC tablet 81 mg, 81 mg, oral, Nightly, Rhianna Holland MD, 81 mg at 05/20/24 2043    carboxymethylcellulose (Refresh Celluvisc) 1 % ophthalmic solution 1 drop, 1 drop, Both Eyes, PRN, Rhianna Holland MD    diclofenac sodium (Voltaren) 1 % gel 4 g, 4 g, " Topical, 4x daily PRN, Rhianna Holland MD    [Held by provider] ergocalciferol (Vitamin D-2) capsule 1,250 mcg, 1,250 mcg, oral, Every Sunday, Rhianna Holland MD    melatonin tablet 5 mg, 5 mg, oral, Nightly PRN, Rhianna Holland MD    [Held by provider] nitroglycerin (Nitrostat) SL tablet 0.4 mg, 0.4 mg, sublingual, q5 min PRN, Rhianna Holland MD    nystatin (Mycostatin) 100,000 unit/gram powder 1 Application, 1 Application, Topical, BID, Rhianna Holland MD, 1 Application at 05/25/24 2104    ondansetron (Zofran) tablet 4 mg, 4 mg, oral, q8h PRN **OR** ondansetron (Zofran) injection 4 mg, 4 mg, intravenous, q8h PRN, Rhianna Holland MD, 4 mg at 05/21/24 0930    oxygen (O2) therapy, , inhalation, Continuous - Inhalation, Rhianna Holland MD, 2 L/min at 05/25/24 2000    oxygen (O2) therapy, , inhalation, Continuous PRN - O2/gases, Rhianna Holland MD, 4 L/min at 05/24/24 0000    pantoprazole (ProtoNix) injection 40 mg, 40 mg, intravenous, BID, Rhianna Holland MD, 40 mg at 05/25/24 2104    perflutren protein A microsphere (Optison) injection 0.5 mL, 0.5 mL, intravenous, Once in imaging, Rhianna Holland MD    [Held by provider] rosuvastatin (Crestor) tablet 10 mg, 10 mg, oral, Nightly, Rhianna Holland MD, 10 mg at 05/20/24 2042    sulfur hexafluoride microsphr (Lumason) injection 24.28 mg, 2 mL, intravenous, Once in imaging, Rhianna Holland MD    [Held by provider] tamsulosin (Flomax) 24 hr capsule 0.4 mg, 0.4 mg, oral, Daily, Rhianna Holland MD, 0.4 mg at 05/21/24 0933    [Held by provider] traZODone (Desyrel) tablet 100 mg, 100 mg, oral, Nightly, Rhianna Holland MD, 100 mg at 05/20/24 8726                                                                            Labs     Results for orders placed or performed during the hospital encounter of 05/19/24 (from the past 24 hour(s))   Comprehensive Metabolic Panel   Result Value Ref Range    Glucose 80 74 - 99 mg/dL    Sodium 147 (H) 136 - 145 mmol/L    Potassium  3.5 3.5 - 5.3 mmol/L    Chloride 108 (H) 98 - 107 mmol/L    Bicarbonate 25 21 - 32 mmol/L    Anion Gap 18 10 - 20 mmol/L    Urea Nitrogen 73 (H) 6 - 23 mg/dL    Creatinine 3.62 (H) 0.50 - 1.30 mg/dL    eGFR 16 (L) >60 mL/min/1.73m*2    Calcium 8.3 (L) 8.6 - 10.6 mg/dL    Albumin 2.4 (L) 3.4 - 5.0 g/dL    Alkaline Phosphatase 67 33 - 136 U/L    Total Protein 5.1 (L) 6.4 - 8.2 g/dL    AST 33 9 - 39 U/L    Bilirubin, Total 0.6 0.0 - 1.2 mg/dL    ALT 18 10 - 52 U/L   Magnesium   Result Value Ref Range    Magnesium 2.54 (H) 1.60 - 2.40 mg/dL   Phosphorus   Result Value Ref Range    Phosphorus 3.3 2.5 - 4.9 mg/dL   Vancomycin   Result Value Ref Range    Vancomycin 18.2 5.0 - 20.0 ug/mL   CBC and Auto Differential   Result Value Ref Range    WBC 9.6 4.4 - 11.3 x10*3/uL    nRBC 0.2 (H) 0.0 - 0.0 /100 WBCs    RBC 2.89 (L) 4.50 - 5.90 x10*6/uL    Hemoglobin 8.2 (L) 13.5 - 17.5 g/dL    Hematocrit 25.9 (L) 41.0 - 52.0 %    MCV 90 80 - 100 fL    MCH 28.4 26.0 - 34.0 pg    MCHC 31.7 (L) 32.0 - 36.0 g/dL    RDW 18.4 (H) 11.5 - 14.5 %    Platelets 51 (L) 150 - 450 x10*3/uL    Immature Granulocytes %, Automated 3.9 (H) 0.0 - 0.9 %    Immature Granulocytes Absolute, Automated 0.37 0.00 - 0.50 x10*3/uL   Manual Differential   Result Value Ref Range    Neutrophils %, Manual 92.2 40.0 - 80.0 %    Bands %, Manual 2.6 0.0 - 5.0 %    Lymphocytes %, Manual 1.7 13.0 - 44.0 %    Monocytes %, Manual 1.7 2.0 - 10.0 %    Eosinophils %, Manual 1.8 0.0 - 6.0 %    Basophils %, Manual 0.0 0.0 - 2.0 %    Seg Neutrophils Absolute, Manual 8.85 (H) 1.60 - 5.00 x10*3/uL    Bands Absolute, Manual 0.25 0.00 - 0.50 x10*3/uL    Lymphocytes Absolute, Manual 0.16 (L) 0.80 - 3.00 x10*3/uL    Monocytes Absolute, Manual 0.16 0.05 - 0.80 x10*3/uL    Eosinophils Absolute, Manual 0.17 0.00 - 0.40 x10*3/uL    Basophils Absolute, Manual 0.00 0.00 - 0.10 x10*3/uL    Total Cells Counted 115     Neutrophils Absolute, Manual 9.10 (H) 1.60 - 5.50 x10*3/uL    RBC Morphology  No significant RBC morphology present    POCT GLUCOSE   Result Value Ref Range    POCT Glucose 74 74 - 99 mg/dL   Urine electrolytes   Result Value Ref Range    Sodium, Urine Random 33 mmol/L    Sodium/Creatinine Ratio 31 Not established. mmol/g Creat    Potassium, Urine Random 23 mmol/L    Potassium/Creatinine Ratio 21 Not established mmol/g Creat    Chloride, Urine Random 15 mmol/L    Chloride/Creatinine Ratio 14 (L) 23 - 275 mmol/g creat    Creatinine, Urine Random 108.0 20.0 - 370.0 mg/dL   Urea Nitrogen, Urine Random   Result Value Ref Range    Urea Nitrogen, Urine Random 775 mg/dL    Creatinine, Urine Random 108.2 20.0 - 370.0 mg/dL    Urea Nitrogen/Creatinine Ratio 7.2 Not established. g/g creat                                                                            Imaging   CT C/A/P without contrast 5/22:  IMPRESSION:  Chest  1. Interval improvement of the right lower lobe patchy infiltrate.  2. Again seen left lung collapse, left pleural thickening with  invasion into the left posterior chest wall, and small left loculated  pleural effusion consistent with patient's known mesothelioma and is  unchanged when compared to prior CT from 05/18/2024.  3. Unchanged prominent mediastinal and perihilar lymph nodes.  4. Previously noted left pulmonary artery thrombus is not well  evaluated on this exam due to beam hardening artifact, positioning of  patient's arms, and lack of intravenous contrast.    AXR 5/21:  IMPRESSION:  1.  The enteric tube is positioned within the gastric fundus  2. Advancement of the tube would be recommended for more optimal  positioning  3. Predominantly fluid-filled small bowel and colon pattern  4. Opacification of the visualized left thorax and multiple air  bronchograms within the left lung     AXR 5/21  IMPRESSION:  1.  The stomach is moderately distended with air otherwise,  nonspecific predominantly fluid-filled bowel pattern     CT Chest/ abd/pelvis 5/18:  IMPRESSION:  CHEST:  1.  Significant collapse of the left lung with heterogenous  appearance, pleural thickening and small loculated pleural fluid  measuring 7.8 cm consistent with the patient's known mesothelioma  which have worsened from the prior CT scan dated 06/14/2023 and  grossly unchanged from 12/10/2023 unenhanced CT scan allowing for  differences in techniques. Focus of new left posterior chest wall  invasion noted at the level of 9th 10th and 10th 11th rib spaces.  2. Right lower lobe ill-defined patchy infiltrate measuring 2.3 x 1.4  cm. Trace right-sided pleural effusion with surrounding atelectasis.  3. Redemonstrated left hilar ill-defined enhancing fullness appears  extending to the left pulmonary trunk likely tumor thrombus and felt  less likely bland thrombus which has significantly worsened from  06/14/2023 CT scan.  4. Mildly enlarged multiple mediastinal lymph nodes in the largest in  the subcarinal region measuring 1.6 cm, grossly unchanged from prior.      ABDOMEN-PELVIS:  1. Proximal sigmoid diverticulosis with mild wall thickening could be  related to episodes of underlying mild uncomplicated diverticulitis  2. Other ancillary findings are unchanged.     CT Abd/pelvis 5/17:  IMPRESSION:  1.  Subtle inflammatory change along the proximal sigmoid colon which  may represent a low-grade acute diverticulitis.  No definite  perforation or abscess.  2.  Complete collapse and consolidation of the left lung with a  loculated effusion at the left lung base, unchanged compared to the  prior chest CT and likely consistent with patient's reported  mesothelioma, possibly chronic post treatment change.  3.  Cardiomegaly with chronic changes suggesting COPD.  4.  Cholelithiasis.  5.  Moderate bilateral renal atrophy with nonobstructing 6 mm left  renal calculus.  6.  Additional chronic changes as described.                                                                         GI Procedures   Flex sig 5/24:  Impression  Blood  present in the rectum  Scattered diverticulosis in the sigmoid colon  The sigmoid colon, rectosigmoid and rectum appeared normal.  Green bilious stool coming more proximally, without any blood, blood clots or hematin.  Likely bleeding from rectum or sigmoid, no evidence of active bleeding at this time    ASSESSMENT / PLAN     ASSESSMENT/PLAN:  88 y.o. 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 Gastroenterology is consulted for concern for GIB.      Patient does have worsening coagulopathy and thrombocytopenia. Likely in the setting of ongoing fevers/ sepsis. Ddx: mucosal oozing in the setting of coagulopathy, vascular lesion, gastritis/ esophagitis/duodenitis, PUD, etc.. Reassuringly today without any worsening hemodynamics, and NGT without active bleeding or coffee grounds,  though patient is now on HFNC for resp support. Improveent in coagulopathy though worsening thrombocytopenia.      Pt underwent flex sig today with evidence of blood in rectum, though no source or active site was seen. More proximal to rectum there was brown stool and bile, suggesting source is not upper. Will continue to closely monitor.      #coffee grounds via NGT  #melena    RECS:  Hemoglobin is stable, can start off with CLD and see if he tolerates it  No emergent need for EGD at this time, will wait until he is clinically better (or sooner if he starts having melena, coffee ground emesis, etc)  Continue IV PPI BID  Continue daily CBC check and aim for hemoglobin above 7 and platelets above 50    Patient was seen and discussed with GI attending, Dr. Kaylen Valenzuela.     Thank you for this consult. Gastroenterology will continue to follow.    -During weekday hours of 7am-5pm please do not hesitate to contact me on Epic  Chat or page [phone] if there are any further questions between the weekday hours of 7 AM - 5 PM.   -After hours, on weekends, and on holidays, please [place] [phone]. Thank you.    Zaira Mcghee MD MPH   GI Fellow   [facility] [place]

## 2024-05-27 LAB
ALBUMIN SERPL BCP-MCNC: 2.5 G/DL (ref 3.4–5)
ANION GAP SERPL CALC-SCNC: 17 MMOL/L (ref 10–20)
BASOPHILS # BLD AUTO: 0.01 X10*3/UL (ref 0–0.1)
BASOPHILS # BLD MANUAL: 0 X10*3/UL (ref 0–0.1)
BASOPHILS NFR BLD AUTO: 0.2 %
BASOPHILS NFR BLD MANUAL: 0 %
BUN SERPL-MCNC: 69 MG/DL (ref 6–23)
CALCIUM SERPL-MCNC: 8 MG/DL (ref 8.6–10.6)
CHLORIDE SERPL-SCNC: 108 MMOL/L (ref 98–107)
CO2 SERPL-SCNC: 23 MMOL/L (ref 21–32)
CREAT SERPL-MCNC: 3.1 MG/DL (ref 0.5–1.3)
EGFRCR SERPLBLD CKD-EPI 2021: 19 ML/MIN/1.73M*2
EOSINOPHIL # BLD AUTO: 0.22 X10*3/UL (ref 0–0.4)
EOSINOPHIL # BLD MANUAL: 0.34 X10*3/UL (ref 0–0.4)
EOSINOPHIL NFR BLD AUTO: 4.6 %
EOSINOPHIL NFR BLD MANUAL: 7.8 %
ERYTHROCYTE [DISTWIDTH] IN BLOOD BY AUTOMATED COUNT: 16.8 % (ref 11.5–14.5)
ERYTHROCYTE [DISTWIDTH] IN BLOOD BY AUTOMATED COUNT: 17.1 % (ref 11.5–14.5)
GLUCOSE SERPL-MCNC: 94 MG/DL (ref 74–99)
HCT VFR BLD AUTO: 23.6 % (ref 41–52)
HCT VFR BLD AUTO: 24.4 % (ref 41–52)
HGB BLD-MCNC: 7.6 G/DL (ref 13.5–17.5)
HGB BLD-MCNC: 7.6 G/DL (ref 13.5–17.5)
IMM GRANULOCYTES # BLD AUTO: 0.11 X10*3/UL (ref 0–0.5)
IMM GRANULOCYTES # BLD AUTO: 0.13 X10*3/UL (ref 0–0.5)
IMM GRANULOCYTES NFR BLD AUTO: 2.5 % (ref 0–0.9)
IMM GRANULOCYTES NFR BLD AUTO: 2.7 % (ref 0–0.9)
LYMPHOCYTES # BLD AUTO: 0.25 X10*3/UL (ref 0.8–3)
LYMPHOCYTES # BLD MANUAL: 0.15 X10*3/UL (ref 0.8–3)
LYMPHOCYTES NFR BLD AUTO: 5.2 %
LYMPHOCYTES NFR BLD MANUAL: 3.5 %
MAGNESIUM SERPL-MCNC: 2.4 MG/DL (ref 1.6–2.4)
MCH RBC QN AUTO: 27.1 PG (ref 26–34)
MCH RBC QN AUTO: 27.7 PG (ref 26–34)
MCHC RBC AUTO-ENTMCNC: 31.1 G/DL (ref 32–36)
MCHC RBC AUTO-ENTMCNC: 32.2 G/DL (ref 32–36)
MCV RBC AUTO: 84 FL (ref 80–100)
MCV RBC AUTO: 89 FL (ref 80–100)
MONOCYTES # BLD AUTO: 0.14 X10*3/UL (ref 0.05–0.8)
MONOCYTES # BLD MANUAL: 0.04 X10*3/UL (ref 0.05–0.8)
MONOCYTES NFR BLD AUTO: 2.9 %
MONOCYTES NFR BLD MANUAL: 0.9 %
NEUTROPHILS # BLD AUTO: 4.05 X10*3/UL (ref 1.6–5.5)
NEUTROPHILS # BLD MANUAL: 3.77 X10*3/UL (ref 1.6–5.5)
NEUTROPHILS NFR BLD AUTO: 84.4 %
NEUTS BAND # BLD MANUAL: 0.37 X10*3/UL (ref 0–0.5)
NEUTS BAND NFR BLD MANUAL: 8.7 %
NEUTS SEG # BLD MANUAL: 3.4 X10*3/UL (ref 1.6–5)
NEUTS SEG NFR BLD MANUAL: 79.1 %
NRBC BLD-RTO: 0 /100 WBCS (ref 0–0)
NRBC BLD-RTO: 0 /100 WBCS (ref 0–0)
PHOSPHATE SERPL-MCNC: 3.8 MG/DL (ref 2.5–4.9)
PLATELET # BLD AUTO: 35 X10*3/UL (ref 150–450)
PLATELET # BLD AUTO: 42 X10*3/UL (ref 150–450)
POTASSIUM SERPL-SCNC: 3.6 MMOL/L (ref 3.5–5.3)
RBC # BLD AUTO: 2.74 X10*6/UL (ref 4.5–5.9)
RBC # BLD AUTO: 2.8 X10*6/UL (ref 4.5–5.9)
RBC MORPH BLD: ABNORMAL
SODIUM SERPL-SCNC: 144 MMOL/L (ref 136–145)
TOTAL CELLS COUNTED BLD: 115
WBC # BLD AUTO: 4.3 X10*3/UL (ref 4.4–11.3)
WBC # BLD AUTO: 4.8 X10*3/UL (ref 4.4–11.3)

## 2024-05-27 PROCEDURE — 36415 COLL VENOUS BLD VENIPUNCTURE: CPT | Performed by: STUDENT IN AN ORGANIZED HEALTH CARE EDUCATION/TRAINING PROGRAM

## 2024-05-27 PROCEDURE — 85007 BL SMEAR W/DIFF WBC COUNT: CPT | Performed by: STUDENT IN AN ORGANIZED HEALTH CARE EDUCATION/TRAINING PROGRAM

## 2024-05-27 PROCEDURE — 85027 COMPLETE CBC AUTOMATED: CPT | Mod: 91 | Performed by: STUDENT IN AN ORGANIZED HEALTH CARE EDUCATION/TRAINING PROGRAM

## 2024-05-27 PROCEDURE — 2500000004 HC RX 250 GENERAL PHARMACY W/ HCPCS (ALT 636 FOR OP/ED): Performed by: STUDENT IN AN ORGANIZED HEALTH CARE EDUCATION/TRAINING PROGRAM

## 2024-05-27 PROCEDURE — 1200000003 HC ONCOLOGY  ROOM WITH TELEMETRY DAILY

## 2024-05-27 PROCEDURE — 80069 RENAL FUNCTION PANEL: CPT | Performed by: STUDENT IN AN ORGANIZED HEALTH CARE EDUCATION/TRAINING PROGRAM

## 2024-05-27 PROCEDURE — 85025 COMPLETE CBC W/AUTO DIFF WBC: CPT | Performed by: STUDENT IN AN ORGANIZED HEALTH CARE EDUCATION/TRAINING PROGRAM

## 2024-05-27 PROCEDURE — C9113 INJ PANTOPRAZOLE SODIUM, VIA: HCPCS | Performed by: STUDENT IN AN ORGANIZED HEALTH CARE EDUCATION/TRAINING PROGRAM

## 2024-05-27 PROCEDURE — 99233 SBSQ HOSP IP/OBS HIGH 50: CPT

## 2024-05-27 PROCEDURE — 83735 ASSAY OF MAGNESIUM: CPT | Performed by: STUDENT IN AN ORGANIZED HEALTH CARE EDUCATION/TRAINING PROGRAM

## 2024-05-27 PROCEDURE — 2500000005 HC RX 250 GENERAL PHARMACY W/O HCPCS: Performed by: STUDENT IN AN ORGANIZED HEALTH CARE EDUCATION/TRAINING PROGRAM

## 2024-05-27 RX ADMIN — PANTOPRAZOLE SODIUM 40 MG: 40 INJECTION, POWDER, FOR SOLUTION INTRAVENOUS at 09:03

## 2024-05-27 RX ADMIN — PANTOPRAZOLE SODIUM 40 MG: 40 INJECTION, POWDER, FOR SOLUTION INTRAVENOUS at 20:11

## 2024-05-27 RX ADMIN — Medication 3 L/MIN: at 20:00

## 2024-05-27 RX ADMIN — NYSTATIN 1 APPLICATION: 100000 POWDER TOPICAL at 09:02

## 2024-05-27 RX ADMIN — NYSTATIN 1 APPLICATION: 100000 POWDER TOPICAL at 20:29

## 2024-05-27 ASSESSMENT — COGNITIVE AND FUNCTIONAL STATUS - GENERAL
HELP NEEDED FOR BATHING: TOTAL
TURNING FROM BACK TO SIDE WHILE IN FLAT BAD: A LOT
DRESSING REGULAR UPPER BODY CLOTHING: TOTAL
EATING MEALS: A LOT
MOBILITY SCORE: 9
HELP NEEDED FOR BATHING: TOTAL
MOVING FROM LYING ON BACK TO SITTING ON SIDE OF FLAT BED WITH BEDRAILS: A LOT
DAILY ACTIVITIY SCORE: 8
HELP NEEDED FOR BATHING: TOTAL
TURNING FROM BACK TO SIDE WHILE IN FLAT BAD: A LOT
TOILETING: TOTAL
TURNING FROM BACK TO SIDE WHILE IN FLAT BAD: A LOT
MOVING TO AND FROM BED TO CHAIR: A LOT
WALKING IN HOSPITAL ROOM: TOTAL
TOILETING: A LOT
MOVING TO AND FROM BED TO CHAIR: A LOT
CLIMB 3 TO 5 STEPS WITH RAILING: TOTAL
MOBILITY SCORE: 9
PERSONAL GROOMING: A LOT
PERSONAL GROOMING: A LOT
WALKING IN HOSPITAL ROOM: TOTAL
STANDING UP FROM CHAIR USING ARMS: TOTAL
WALKING IN HOSPITAL ROOM: TOTAL
DRESSING REGULAR UPPER BODY CLOTHING: TOTAL
CLIMB 3 TO 5 STEPS WITH RAILING: TOTAL
DAILY ACTIVITIY SCORE: 8
DRESSING REGULAR UPPER BODY CLOTHING: TOTAL
CLIMB 3 TO 5 STEPS WITH RAILING: TOTAL
DRESSING REGULAR LOWER BODY CLOTHING: TOTAL
EATING MEALS: A LOT
DAILY ACTIVITIY SCORE: 9
PERSONAL GROOMING: A LOT
EATING MEALS: A LOT
MOVING FROM LYING ON BACK TO SITTING ON SIDE OF FLAT BED WITH BEDRAILS: A LOT
MOVING TO AND FROM BED TO CHAIR: A LOT
DRESSING REGULAR LOWER BODY CLOTHING: TOTAL
MOVING FROM LYING ON BACK TO SITTING ON SIDE OF FLAT BED WITH BEDRAILS: A LOT
MOBILITY SCORE: 9
DRESSING REGULAR LOWER BODY CLOTHING: TOTAL
STANDING UP FROM CHAIR USING ARMS: TOTAL
TOILETING: TOTAL
STANDING UP FROM CHAIR USING ARMS: TOTAL

## 2024-05-27 ASSESSMENT — PAIN SCALES - GENERAL
PAINLEVEL_OUTOF10: 0 - NO PAIN

## 2024-05-27 ASSESSMENT — PAIN - FUNCTIONAL ASSESSMENT: PAIN_FUNCTIONAL_ASSESSMENT: 0-10

## 2024-05-27 NOTE — PROGRESS NOTES
Subjective   Wife also present at bedside during morning rounds. No acute events overnight. Has no complaints this morning.       Objective     Vitals:  Vitals:    05/27/24 0542   BP: 128/74   Pulse: 108   Resp: 20   Temp: 35.3 °C (95.5 °F)   SpO2: 98%       I/O last 3 completed shifts:  In: 1000 (9.2 mL/kg) [IV Piggyback:1000]  Out: 2250 (20.7 mL/kg) [Urine:1500 (0.4 mL/kg/hr); Emesis/NG output:750]  Weight: 108.8 kg   No intake/output data recorded.    Physical exam:  Constitutional: Ill appearing  HEENT: Normocephalic, atraumatic. PERRL. EOMI. No cervical lymphadenopathy.  Respiratory: hyperresonant breath sounds on left  Cardiovascular: Afib RVR, no murmurs noted  Abdominal: Soft, nontender, nondistended  Skin: contusion b/l hand and L arm. LLE 2+ edema, RLE no edema. LLE erythema outlined  Psych: Appropriate mood and affect.    Medications:  [Held by provider] ascorbic acid, 1,000 mg, oral, Daily  [Held by provider] aspirin, 81 mg, oral, Nightly  [Held by provider] ergocalciferol, 1,250 mcg, oral, Every Sunday  nystatin, 1 Application, Topical, BID  oxygen, , inhalation, Continuous - Inhalation  pantoprazole, 40 mg, intravenous, BID  perflutren protein A microsphere, 0.5 mL, intravenous, Once in imaging  [Held by provider] rosuvastatin, 10 mg, oral, Nightly  sulfur hexafluoride microsphr, 2 mL, intravenous, Once in imaging  [Held by provider] tamsulosin, 0.4 mg, oral, Daily  [Held by provider] traZODone, 100 mg, oral, Nightly         PRN medications: acetaminophen, albuterol, carboxymethylcellulose, diclofenac sodium, melatonin, [Held by provider] nitroglycerin, ondansetron **OR** ondansetron, oxygen    Labs:  No results found for this or any previous visit (from the past 24 hour(s)).      Imaging:  FL modified barium swallow study    Result Date: 5/26/2024  Interpreted By:  Sohail Torres,  and Xavi Jerez STUDY: FL MODIFIED BARIUM SWALLOW STUDY;; 5/25/2024 9:06 am   INDICATION: Signs/Symptoms:r\o  dysphagia.   COMPARISON: None.   ACCESSION NUMBER(S): [id number]   ORDERING CLINICIAN: ALEXANDRA AVILA   TECHNIQUE: MBSS completed. Informed verbal consent obtained prior to completion of exam. Trials of thin, nectar thick,  puree, and regular solids given. Fluoroscopy time :  2 minutes.   SLP: Meri Hurley MS CCC/SLP Phone/Pager: Epic Chat   SPEECH FINDINGS: Reason for referral: Further assessment of oropharyngeal swallow Patient hx: Recent RLL PNA. S/s of aspiration w/3 oz Swallow Protocol Respiratory status: Nasal cannula Previous diet: Reg/thin   FINAL SPEECH RECOMMENDATIONS   Diet recommendations/feeding strategies: Solid Diet Recommendations : Easy to Chew Liquid Diet Recommendations: Thin Medication Administration: Single w/ sips of water, whole in puree as needed   Safe Swallow Strategies/Guidelines: Only present PO intake when awake and alert Supervision/assist w/ PO intake to assure adherence to safe swallow strategies Upright positioning Slow rate/small boluses   Follow-up speech therapy recommended: Yes.   Short term goals: Pt will tolerate least restrictive diet with adequate oral phase and no s/s of aspiration. Date initiated: 5/25/24 Status: Goal initiated   Pt will adhere to safe swallow strategies during PO intake with > 90% accuracy independently. Date initiated: 5/25/24 Status: Goal initiated   Education provided: Yes.     Mechanics of the swallow summary: *Oral phase: Mild deficits.   Adequate bolus acceptance.  Prolonged mastication and bolus formation/transit w/ regular solids.  No oral residue.   *Pharyngeal phase: Mild deficits.   Incomplete epiglottic inversion, may be related to presence of NGT. Timely swallow initiation.  Adequate hyolaryngeal elevation/excursion.  Trace penetration x1 with consecutive boluses of thin liquids related to incomplete airway protection.  Ejected upon swallow completion.  No other penetration and no aspiration with any other consistency assessed.  Mild residue at  the valleculae following the swallow.  Largely cleared with liquid wash.  Mildly reduced distention of PES.     SLP impressions with severity rating: Mild oropharyngeal dysphagia characterized by prolonged mastication/bolus formation and mild residue at the valleculae following the swallow.   Thin Liquids (MBSS) Rosenbek's Penetration Aspiration Scale, Thin Liquids (MBSS): 2. PENETRATION that CLEARS - contrast enter airway, above vocal cords, no residue     Nectar Thick Liquids (MBSS) Rosenbek's Penetration Aspiration Scale, Nectar thick liquids (MBSS): 1. NO ASPIRATION & NO PENETRATION - no aspiration, contrast does not enter airway       Purees (MBSS) Rosenbek's Penetration Aspiration Scale, Purees (MBSS): 1. NO ASPIRATION & NO PENETRATION - no aspiration, contrast does not enter airway     Solids (MBSS) Rosenbek's Penetration Aspiration Scale, Solids (MBSS): 1. NO ASPIRATION & NO PENETRATION - no aspiration, contrast does not enter airway     Speech Therapy section of this report signed by Meri Hurley MS CCC/SLP on 5/25/2024 at 10:17 am.   RADIOLOGY FINDINGS: Nasoenteric tube is partially visualized. No evidence of acute osseous abnormality of the cervical spine. Patient is edentulous.   Radiology section of this report signed by Dr. Torres.       1. No radiographic evidence of acute process in the neck. 2. Please refer to speech pathology report for additional findings.   MACRO: None   Signed by: Sohail Torres 5/26/2024 3:51 PM Dictation workstation:   [id number]    XR abdomen 1 view    Result Date: 5/25/2024  Interpreted By:  Marcie Shah, STUDY: XR ABDOMEN 1 VIEW; 5/25/2024 5:45 pm   INDICATION: Signs/Symptoms:ngt placed.   COMPARISON: Radiograph dated 05/25/2024, 2:33 p.m.   ACCESSION NUMBER(S): [id number]   ORDERING CLINICIAN: ALEXANDRA AVILA   FINDINGS: Single-view of the abdomen. The pelvis is not included. Enteric tube is in place with the tip projecting over the stomach. Positive  contrast is identified in the gastric fundus. Prominent loops of bowel throughout the visualized upper abdomen. Nonobstructive bowel gas pattern.  Limited evaluation of pneumoperitoneum on supine imaging, however no gross evidence of free air is noted.   Extensive consolidation in visualized portion of the left lung.   Osseous structures demonstrate no acute bony changes.       1.  Enteric tube projecting over the proximal stomach. 2. Again seen multiple prominent loops of bowel throughout the upper abdomen. Correlation with ileus. Recommend follow-up radiograph.   Signed by: Marcei Rodriguez 5/25/2024 5:54 PM Dictation workstation:   [id number]    XR abdomen 1 view    Result Date: 5/25/2024  Interpreted By:  Marcie Shah, STUDY: XR ABDOMEN 1 VIEW; 5/25/2024 3:06 pm   INDICATION: Signs/Symptoms:distended abdomen, assess for dilated bowel loops.   COMPARISON: Radiograph dated 05/21/2024   ACCESSION NUMBER(S): [id number]   ORDERING CLINICIAN: ALEXANDRA AVILA   FINDINGS: Views of the abdomen and pelvis. What is presumed to be enteric tube is projecting over the lower mediastinum. Positive contrast is identified in the expected location of the stomach. There is also positive contrast throughout the loops of bowel in the lower abdomen. Prominent loops of colon. Limited evaluation of pneumoperitoneum on supine imaging, however no gross evidence of free air is noted.   Consolidative opacities in left lung.   Osseous structures demonstrate no acute bony changes.       1.  Prominent loops of colon which can be due to ileus. Recommend follow-up radiograph.   Signed by: Marcie Rodriguez 5/25/2024 5:33 PM Dictation workstation:   [id number]          Assessment/Plan   88M PMHx PAD, afib/flutter, hx DVT on warfarin, HTN, CAD s/p stent, mitral regurg, HLD, polycythemia vera, managed by Dr. 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 Presented to [facility] 5/14 with abdominal pain, fever, diarrhea, urinary retention c/f sepsis presumed 2/2 PNA; CT CAP showed known LLL collapse 2/2 mesothelioma, new post L chest wall invasion, and c/f L pulmonary trunk tumor thrombus.  5/23 developed shock and acute hypoxic resp failure requiring MICU transfer for pressors and NIV (airvo), thought 2/2 GI bleeding with melena and coffee ground drainage from NGT. Quickly weaned off pressors, s/p flex sig with blood in rectum but no active bleed. Now transferred back to floor for further mgmt.     Updates:   - Passed SLP, tolerating PO  - Having regular BMs    #coffee grounds via NGT (placed 5/21), improved   #melena likely d/t GI bleed  :: KUB 5/21, showing moderate distention of stomach with nonspecific predominantly fluid-filled bowel pattern.  :: 5/20-5/21 patient emesis that is watery, dark brown, malodorous   :: did initially present w/ diarrhea could have been chemo induced vs stool ball  :: INR 4.9>6.3>2.4 >1.2  :: s/p Vitamin K 10 mg  :: KUB w/ dilated bowel loops   - Soft diet, encouraged patient to go slow  - holding ferrous sulfate    - pantoprazole 40 bid   - GI consulted, appreciate recs  -s/p flex sig with no evidence of active bleed   -Will hold off EGD since coffee ground emesis resolved    #Illeus (improved/resolved)  :: KUB 5/26 kub shows distended bowel loops likely ileus, will monitor for improvement with PO diet.   - NG tube clamped; If becoming nauseous or developing vomiting, can unclamp NGT and place to LIWS.  - Tolerating PO and having BM. Will CTM    #Pneumonia, Right lower lobe ill-defined patchy infiltrate  :: Resp Cult salivary contamination  :: MRSA nares: negative  :: Strep/Legionella antigens: negative  :: CXR 5/21 showing persistent patchy right infrahilar airspace opacities    :: 5/22 CT CAP w/o contrast: Interval improvement of the right  "lower lobe patchy infiltrate  :: s/p Azithro (5/16-5/18)  :: s/p vanc (5/16-5/18) (5/20-5/22) and zosyn (5/16-5/18) (5/20-22)  - meropenem (5/19-5/20) (5/22-25) and micafungin (5/22-25)  -Abx stopped 5/25 given prolonged course with improvement/decline independent of abx, do not suspect active infection currently. Restart if decompensating     #Tachypnea   #elevated A-a gradient   #AHRF, improved  :: multifactorial, like d/t shunt secondary to malignancy vs pneumonia vs v/q mismatch   :: RR 23-44   :: 5/22 ABG: pH 7.53, pCO2 31, pO2 109, Lactate 2.2, FiO2 40.  - On 3L nasal cannula with intermittent AIRVO (5/22-) to assist with work of breathing   - gradually wean supplemental O2 for SpO2 goal >90%   - continue abx regimen  - CTM      # Worsening mesothelioma  # tumor Thrombus  #Mesothelioma with Loculated Pleural Effusions   #Left Hilar Mass, Left Pulmonary Trunk Mass  #History JOVANNI, Pulmonary Embolism  :: primary oncologist Dr. Galvan  :: s/p L VATS w/ decort 5/2022, XRT 9-10/22  :: s/p \"half-dose\" pemfexy on 5/14  :: CT chest 5/18 with significant collapse of left lung, loculated pleural effusions consistent with patient's known mesothelioma.  Also shows ill-defined patchy infiltrate in right lower lobe.  Shows left hilar mass with extension into left pulmonary trunk likely tumor thrombus   - Holding warfarin, continue to monitor   - Dr. Henderson recommends transitioning to Eliquis on discharge, deferring anticoagulation iso thrombocytopenia. Consider 2.5mg BID vs 5mg BID on discharge  - continuing protocol w/ 1 mg folic acid daily     #Fever  #Sepsis most likely d/t pneumonia  :: at OSH met SIRS criteria (febrile, tachypnea)  :: UA 5/18 with small LE, 3+ bacteria, urine cx 5/21 negative  :: Resp Cult salivary contamination  :: MRSA nares: negative  :: Strep/Legionella antigens: negative  :: c diff and enteric stool panel: negative  :: BC 5/16 NGTD, repeat BC 5/20 NGTD  :: CRP 30.46>42.36  :: CXR 5/21 showing " persistent patchy right infrahilar airspace opacities    :: s/p Azithro (5/16-5/18)  :: asymmetric warmth, erythema, edema noted in left lower extremity   :: s/p vanc (5/16-5/18) (5/20-5/25) and zosyn (5/16-5/18) (5/20-22)  :: 5/22 CT CAP w/o contrast: Interval improvement of the right lower lobe patchy infiltrate, Interval flattening of the inferior vena cava which may be seen with hypovolemia, Improved proximal sigmoid diverticulitis.  - repeat blood cultures drawn 5/22, NGTD   - meropenem (5/19-5/20) (5/22-5/25) and micafungin (5/22-5/25)      #Lower leg skin changes likely d/t cellulitis   - improved margins of left lower extremity skin changes, will ctm      #anemia, improved  - Likely 2/2 GIB noted this admission  :: melena and coffee grounds via NGT noted 5/22  :: Hgb initially 10, decreased to 6.9 on 5/23, now stable since 5/24  - S/p 1u pRBC 5/23  - Continue to trend Hgb BID (goal > 7), platelets goal > 10. Platelet goal liberalized as no active bleeding, if hgb decreasing or bleeding clinically then transfuse to goal 50        #VIK on CKD, likely prerenal d/t fluid status   # CKD (baseline Cr 1.3-1.4)  # urine retention s/p Boyd Catheter Placement by Urology in OSH  :: US renal 5/16: Nonobstructing 1 cm left renal calculus, no hydronephrosis  :: Baseline Cr 1.3-1.4  :: Cr 1.64>2.26>3.00  :: Urine electrolytes pre-renal, Net negative, hypernatremia with prolonged NPO all suggest pre-renal hypovolemic VIK. Will encourage PO intake  - will eventually need boyd removal and trial  - strict in/out  - tamsulosin 0.4 mg daily- holding iso NPO  -minimize nephrotoxic medications as able  - 2.5 L fluids given overnight (5/23)   - boyd removed 5/23/24 due to it being placed since 5/16  - FENa 0.8%, -2.2L LOS, likely hypovolemic. Give 1L LR 5/26, encourage PO intake     #Pancytopenia, most likely medication induced from pemfexy, resolved  #Thrombocytopenia, improved  :: c/f TTP, blood smear pending   ::   ::  s/p pemfexy 5/14  :: d dimer 1521, fibrinogen >1000- no c/f dic  :: no schistocytes on smear  ::   :: filgrastim 480 mcg (5/21-5/22)  - 5/23 ANC 4130   - haptoglobin 400   - Platelets 55>38, ctm. Goal >10  - s/p 1u platelets 5/23 (was bleeding at the time)     #Elevated INR  :: INR 4.9>6.3>2.4 >1.2  :: s/p Vitamin K 10 mg  -CTM     #History Afib, HLD, CAD, PAD  #History DVT/PE on Warfarin  :: 5/21 TTE showing:  Left ventricular systolic function is hyperdynamic with a 70-75% estimated ejection fraction, Mildly elevated RVSP, Moderately dilated aortic root   - Holding home metoprolol 25 mg twice daily  - Holding warfarin, aspirin  - Continue home statin- holding iso npo     # Hospital-acquired Delirium  :: waxing and waning altered mental status  - apply delirium precautions  -hold home trazodone for insomnia      Antibiotics:: none  Dispo: SNF     F: PRPN  E: Keep Mg >2, phos >3  and K >4  N: Soft Regular  A: PIVx2  O2: 3 L NC  DVT ppx: none iso thrombocytopenia  Code Status: DNR/DNI   Contact: Kirsten Arriola (Spouse)  [phone] (Work Phone)     Ramesh Conley  Internal Medicine PGY-I

## 2024-05-27 NOTE — CARE PLAN
Notified by CTA patients RR 28, upon assessing patient pt stated he feels in distress. RR at 28, on 2L NC. O2 saturating at 91%.     Patient states he is not having SOB, feels okay, O2 at 95%, RR 21.         Problem: Fall/Injury  Goal: Not fall by end of shift  Outcome: Progressing  Goal: Be free from injury by end of the shift  Outcome: Progressing  Goal: Verbalize understanding of personal risk factors for fall in the hospital  Outcome: Progressing  Goal: Verbalize understanding of risk factor reduction measures to prevent injury from fall in the home  Outcome: Progressing  Goal: Use assistive devices by end of the shift  Outcome: Progressing  Goal: Pace activities to prevent fatigue by end of the shift  Outcome: Progressing     Problem: Skin  Goal: Prevent/manage excess moisture  Outcome: Progressing  Goal: Prevent/minimize sheer/friction injuries  Outcome: Progressing  Goal: Promote/optimize nutrition  Outcome: Progressing  Goal: Promote skin healing  Outcome: Progressing  Flowsheets (Taken 5/27/2024 0105)  Promote skin healing: Turn/reposition every 2 hours/use positioning/transfer devices

## 2024-05-27 NOTE — CARE PLAN
The patient's goals for the shift include      The clinical goals for the shift include free from falls    Over the shift, the patient alert, but forgetful of time as shift progressed. Compliant with calling for assistance with repositioning for comfort. Waffle mattress ordered to assist with comfort.

## 2024-05-27 NOTE — CARE PLAN
Problem: Fall/Injury  Goal: Verbalize understanding of personal risk factors for fall in the hospital  Outcome: Progressing  Goal: Verbalize understanding of risk factor reduction measures to prevent injury from fall in the home  Outcome: Progressing  Goal: Use assistive devices by end of the shift  Outcome: Progressing  Goal: Pace activities to prevent fatigue by end of the shift  Outcome: Progressing     Problem: Skin  Goal: Promote/optimize nutrition  Outcome: Progressing  Flowsheets (Taken 5/27/2024 1235)  Promote/optimize nutrition: Monitor/record intake including meals  Goal: Promote skin healing  Outcome: Progressing  Flowsheets (Taken 5/27/2024 1235)  Promote skin healing: Turn/reposition every 2 hours/use positioning/transfer devices

## 2024-05-27 NOTE — PROGRESS NOTES
"[facility]  [facility] [place]  CONSULT FOLLOW-UP     Reason For Consult  Coffee ground per NG, melena    SUBJECTIVE     No acute events overnight. Intermittently tachycardic. Hb stable.     EXAM     Last Recorded Vitals  Blood pressure 111/73, pulse (!) 120, temperature 36.4 °C (97.5 °F), resp. rate 20, height 1.727 m (5' 7.99\"), weight 109 kg (239 lb 12.8 oz), SpO2 98%.      Intake/Output Summary (Last 24 hours) at 5/27/2024 1222  Last data filed at 5/27/2024 1014  Gross per 24 hour   Intake 0 ml   Output 1325 ml   Net -1325 ml          Physical Exam  General: chronically ill appearing elderly male   HEENT: no pallor, no icterus,  Respiratory: CTA anteriorly   Cardiovascular: tachycardic   Abdomen: Soft, nontender, mildly distended (?obese)  Neuro: alert and oriented X3, strength and sensations grossly normal     OBJECTIVE                                                                              Medications             Current Facility-Administered Medications:     acetaminophen (Ofirmev) injection 1,000 mg, 1,000 mg, intravenous, q6h PRN, Rhianna Holland MD, Stopped at 05/23/24 0303    albuterol 2.5 mg /3 mL (0.083 %) nebulizer solution 2.5 mg, 2.5 mg, nebulization, q6h PRN, Rhianna Holland MD    [Held by provider] ascorbic acid (Vitamin C) tablet 1,000 mg, 1,000 mg, oral, Daily, Rhianna Holland MD, 1,000 mg at 05/21/24 0934    [Held by provider] aspirin EC tablet 81 mg, 81 mg, oral, Nightly, Rhianna Holland MD, 81 mg at 05/20/24 2043    carboxymethylcellulose (Refresh Celluvisc) 1 % ophthalmic solution 1 drop, 1 drop, Both Eyes, PRN, Rhianna Holland MD    diclofenac sodium (Voltaren) 1 % gel 4 g, 4 g, Topical, 4x daily PRN, Rhianna Holland MD    [Held by provider] ergocalciferol (Vitamin D-2) capsule 1,250 mcg, 1,250 mcg, oral, Every Sunday, Rhianna Holland MD    melatonin tablet 5 mg, 5 mg, oral, Nightly PRN, Rhianna Holland MD, 5 mg at 05/26/24 3884    " [Held by provider] nitroglycerin (Nitrostat) SL tablet 0.4 mg, 0.4 mg, sublingual, q5 min PRN, Rhianna Holland MD    nystatin (Mycostatin) 100,000 unit/gram powder 1 Application, 1 Application, Topical, BID, Rhianna Holland MD, 1 Application at 05/27/24 0902    ondansetron (Zofran) tablet 4 mg, 4 mg, oral, q8h PRN **OR** ondansetron (Zofran) injection 4 mg, 4 mg, intravenous, q8h PRN, Rhianna Holland MD, 4 mg at 05/21/24 0930    oxygen (O2) therapy, , inhalation, Continuous - Inhalation, Rhianna Holland MD, 2 L/min at 05/26/24 2000    oxygen (O2) therapy, , inhalation, Continuous PRN - O2/gases, Rhianna Holland MD, 4 L/min at 05/24/24 0000    pantoprazole (ProtoNix) injection 40 mg, 40 mg, intravenous, BID, Rhianna Holland MD, 40 mg at 05/27/24 0903    perflutren protein A microsphere (Optison) injection 0.5 mL, 0.5 mL, intravenous, Once in imaging, Rhianna Holland MD    [Held by provider] rosuvastatin (Crestor) tablet 10 mg, 10 mg, oral, Nightly, Rhianna Holland MD, 10 mg at 05/20/24 2042    sulfur hexafluoride microsphr (Lumason) injection 24.28 mg, 2 mL, intravenous, Once in imaging, Rhianna Holland MD    [Held by provider] tamsulosin (Flomax) 24 hr capsule 0.4 mg, 0.4 mg, oral, Daily, Rhianna Holland MD, 0.4 mg at 05/21/24 0933    [Held by provider] traZODone (Desyrel) tablet 100 mg, 100 mg, oral, Nightly, Rhianna Holland MD, 100 mg at 05/20/24 2042                                                                            Labs     Results for orders placed or performed during the hospital encounter of 05/19/24 (from the past 24 hour(s))   Magnesium   Result Value Ref Range    Magnesium 2.40 1.60 - 2.40 mg/dL   Renal Function Panel   Result Value Ref Range    Glucose 94 74 - 99 mg/dL    Sodium 144 136 - 145 mmol/L    Potassium 3.6 3.5 - 5.3 mmol/L    Chloride 108 (H) 98 - 107 mmol/L    Bicarbonate 23 21 - 32 mmol/L    Anion Gap 17 10 - 20 mmol/L    Urea Nitrogen 69 (H) 6 - 23 mg/dL    Creatinine 3.10  (H) 0.50 - 1.30 mg/dL    eGFR 19 (L) >60 mL/min/1.73m*2    Calcium 8.0 (L) 8.6 - 10.6 mg/dL    Phosphorus 3.8 2.5 - 4.9 mg/dL    Albumin 2.5 (L) 3.4 - 5.0 g/dL   CBC and Auto Differential   Result Value Ref Range    WBC 4.8 4.4 - 11.3 x10*3/uL    nRBC 0.0 0.0 - 0.0 /100 WBCs    RBC 2.80 (L) 4.50 - 5.90 x10*6/uL    Hemoglobin 7.6 (L) 13.5 - 17.5 g/dL    Hematocrit 23.6 (L) 41.0 - 52.0 %    MCV 84 80 - 100 fL    MCH 27.1 26.0 - 34.0 pg    MCHC 32.2 32.0 - 36.0 g/dL    RDW 16.8 (H) 11.5 - 14.5 %    Platelets 35 (LL) 150 - 450 x10*3/uL    Neutrophils % 84.4 40.0 - 80.0 %    Immature Granulocytes %, Automated 2.7 (H) 0.0 - 0.9 %    Lymphocytes % 5.2 13.0 - 44.0 %    Monocytes % 2.9 2.0 - 10.0 %    Eosinophils % 4.6 0.0 - 6.0 %    Basophils % 0.2 0.0 - 2.0 %    Neutrophils Absolute 4.05 1.60 - 5.50 x10*3/uL    Immature Granulocytes Absolute, Automated 0.13 0.00 - 0.50 x10*3/uL    Lymphocytes Absolute 0.25 (L) 0.80 - 3.00 x10*3/uL    Monocytes Absolute 0.14 0.05 - 0.80 x10*3/uL    Eosinophils Absolute 0.22 0.00 - 0.40 x10*3/uL    Basophils Absolute 0.01 0.00 - 0.10 x10*3/uL                                                                            Imaging   CT C/A/P without contrast 5/22:  IMPRESSION:  Chest  1. Interval improvement of the right lower lobe patchy infiltrate.  2. Again seen left lung collapse, left pleural thickening with  invasion into the left posterior chest wall, and small left loculated  pleural effusion consistent with patient's known mesothelioma and is  unchanged when compared to prior CT from 05/18/2024.  3. Unchanged prominent mediastinal and perihilar lymph nodes.  4. Previously noted left pulmonary artery thrombus is not well  evaluated on this exam due to beam hardening artifact, positioning of  patient's arms, and lack of intravenous contrast.    AXR 5/21:  IMPRESSION:  1.  The enteric tube is positioned within the gastric fundus  2. Advancement of the tube would be recommended for more  optimal  positioning  3. Predominantly fluid-filled small bowel and colon pattern  4. Opacification of the visualized left thorax and multiple air  bronchograms within the left lung     AXR 5/21  IMPRESSION:  1.  The stomach is moderately distended with air otherwise,  nonspecific predominantly fluid-filled bowel pattern     CT Chest/ abd/pelvis 5/18:  IMPRESSION:  CHEST:  1. Significant collapse of the left lung with heterogenous  appearance, pleural thickening and small loculated pleural fluid  measuring 7.8 cm consistent with the patient's known mesothelioma  which have worsened from the prior CT scan dated 06/14/2023 and  grossly unchanged from 12/10/2023 unenhanced CT scan allowing for  differences in techniques. Focus of new left posterior chest wall  invasion noted at the level of 9th 10th and 10th 11th rib spaces.  2. Right lower lobe ill-defined patchy infiltrate measuring 2.3 x 1.4  cm. Trace right-sided pleural effusion with surrounding atelectasis.  3. Redemonstrated left hilar ill-defined enhancing fullness appears  extending to the left pulmonary trunk likely tumor thrombus and felt  less likely bland thrombus which has significantly worsened from  06/14/2023 CT scan.  4. Mildly enlarged multiple mediastinal lymph nodes in the largest in  the subcarinal region measuring 1.6 cm, grossly unchanged from prior.      ABDOMEN-PELVIS:  1. Proximal sigmoid diverticulosis with mild wall thickening could be  related to episodes of underlying mild uncomplicated diverticulitis  2. Other ancillary findings are unchanged.     CT Abd/pelvis 5/17:  IMPRESSION:  1.  Subtle inflammatory change along the proximal sigmoid colon which  may represent a low-grade acute diverticulitis.  No definite  perforation or abscess.  2.  Complete collapse and consolidation of the left lung with a  loculated effusion at the left lung base, unchanged compared to the  prior chest CT and likely consistent with patient's  reported  mesothelioma, possibly chronic post treatment change.  3.  Cardiomegaly with chronic changes suggesting COPD.  4.  Cholelithiasis.  5.  Moderate bilateral renal atrophy with nonobstructing 6 mm left  renal calculus.  6.  Additional chronic changes as described.                                                                         GI Procedures   Flex sig 5/24:  Impression  Blood present in the rectum  Scattered diverticulosis in the sigmoid colon  The sigmoid colon, rectosigmoid and rectum appeared normal.  Green bilious stool coming more proximally, without any blood, blood clots or hematin.  Likely bleeding from rectum or sigmoid, no evidence of active bleeding at this time    ASSESSMENT / PLAN     ASSESSMENT/PLAN:  88 y.o. 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 Gastroenterology is consulted for concern for GIB.      Patient does have worsening coagulopathy and thrombocytopenia. Likely in the setting of ongoing fevers/ sepsis. Ddx: mucosal oozing in the setting of coagulopathy, vascular lesion, gastritis/ esophagitis/duodenitis, PUD, etc.. Reassuringly today without any worsening hemodynamics, and NGT without active bleeding or coffee grounds,  though patient is now on HFNC for resp support. Improvement in coagulopathy though worsening thrombocytopenia.      Pt underwent flex sig on 5/24/2024 with evidence of blood in rectum, though no source or active site was seen. More proximal to rectum there was brown stool and bile, suggesting source is not upper. Pt now has evidence of ongoing hemodynamic stability and stable Hb.      #coffee grounds via NGT  #melena  Hemoglobin is stable, and no signs/symptoms of ileus.   No emergent need for EGD at this time,  Continue IV PPI BID, can  transition to PO when he is tolerating PO   Please check C.diff given multiple loose bowel movements   Continue daily CBC check and aim for hemoglobin above 7 and platelets above 50    Patient was seen and discussed with GI attending, Dr. Kaylen Valenzuela.     Thank you for the consultation.  The consulting team will sign off now.  Please do not hesitate to contact us again on by paging the consultation team again between the weekday hours of 7 AM - 5 PM.  If there is an urgent concern during the weekend, after-hours, or holidays; then please page the on-call GI fellow at [phone]. Thank you.      Ale Archibald MD   GI Fellow   [facility] [place]

## 2024-05-27 NOTE — PROGRESS NOTES
Pre previous notes spouse wanted to explore  AR in [place] as patient had been there before; referrals started and request for updated PT OT notes to review. No precert required.     S/p MBS ; Mild oropharyngeal dysphagia recommend continue SLP  NG in place but tolerating PO and passing BM    underwent first chemotherapy session on 5/15/ 2024; need to clarify plans for DC planning.

## 2024-05-28 ENCOUNTER — APPOINTMENT (OUTPATIENT)
Dept: CARDIOLOGY | Facility: HOSPITAL | Age: 89
End: 2024-05-28
Payer: MEDICARE

## 2024-05-28 ENCOUNTER — APPOINTMENT (OUTPATIENT)
Dept: RADIOLOGY | Facility: HOSPITAL | Age: 89
End: 2024-05-28
Payer: MEDICARE

## 2024-05-28 LAB
ABO GROUP (TYPE) IN BLOOD: NORMAL
ALBUMIN SERPL BCP-MCNC: 2.4 G/DL (ref 3.4–5)
ANION GAP SERPL CALC-SCNC: 16 MMOL/L (ref 10–20)
ANTIBODY SCREEN: NORMAL
ATRIAL RATE: 340 BPM
BASOPHILS # BLD AUTO: 0.01 X10*3/UL (ref 0–0.1)
BASOPHILS NFR BLD AUTO: 0.2 %
BUN SERPL-MCNC: 66 MG/DL (ref 6–23)
CALCIUM SERPL-MCNC: 7.9 MG/DL (ref 8.6–10.6)
CHLORIDE SERPL-SCNC: 105 MMOL/L (ref 98–107)
CO2 SERPL-SCNC: 23 MMOL/L (ref 21–32)
CREAT SERPL-MCNC: 3.14 MG/DL (ref 0.5–1.3)
EGFRCR SERPLBLD CKD-EPI 2021: 19 ML/MIN/1.73M*2
EOSINOPHIL # BLD AUTO: 0.2 X10*3/UL (ref 0–0.4)
EOSINOPHIL NFR BLD AUTO: 4.5 %
ERYTHROCYTE [DISTWIDTH] IN BLOOD BY AUTOMATED COUNT: 16.5 % (ref 11.5–14.5)
GLUCOSE BLD MANUAL STRIP-MCNC: 106 MG/DL (ref 74–99)
GLUCOSE SERPL-MCNC: 94 MG/DL (ref 74–99)
HCT VFR BLD AUTO: 22 % (ref 41–52)
HGB BLD-MCNC: 7.3 G/DL (ref 13.5–17.5)
IMM GRANULOCYTES # BLD AUTO: 0.15 X10*3/UL (ref 0–0.5)
IMM GRANULOCYTES NFR BLD AUTO: 3.3 % (ref 0–0.9)
LACTATE SERPL-SCNC: 1.1 MMOL/L (ref 0.4–2)
LYMPHOCYTES # BLD AUTO: 0.24 X10*3/UL (ref 0.8–3)
LYMPHOCYTES NFR BLD AUTO: 5.3 %
MAGNESIUM SERPL-MCNC: 2.23 MG/DL (ref 1.6–2.4)
MCHC RBC AUTO-ENTMCNC: 33.2 G/DL (ref 32–36)
MCV RBC AUTO: 83 FL (ref 80–100)
MONOCYTES # BLD AUTO: 0.15 X10*3/UL (ref 0.05–0.8)
MONOCYTES NFR BLD AUTO: 3.3 %
MRSA DNA SPEC QL NAA+PROBE: NOT DETECTED
NEUTROPHILS # BLD AUTO: 3.74 X10*3/UL (ref 1.6–5.5)
NEUTROPHILS NFR BLD AUTO: 83.4 %
PHOSPHATE SERPL-MCNC: 4.2 MG/DL (ref 2.5–4.9)
PLATELET # BLD AUTO: 42 X10*3/UL (ref 150–450)
POTASSIUM SERPL-SCNC: 3.5 MMOL/L (ref 3.5–5.3)
Q ONSET: 225 MS
QRS COUNT: 20 BEATS
QRS DURATION: 90 MS
QT INTERVAL: 308 MS
QTC CALCULATION(BAZETT): 442 MS
QTC FREDERICIA: 392 MS
R AXIS: 68 DEGREES
RBC # BLD AUTO: 2.64 X10*6/UL (ref 4.5–5.9)
RH FACTOR (ANTIGEN D): NORMAL
SARS-COV-2 RNA RESP QL NAA+PROBE: NOT DETECTED
SODIUM SERPL-SCNC: 140 MMOL/L (ref 136–145)
T AXIS: -65 DEGREES
T OFFSET: 379 MS
VENTRICULAR RATE: 124 BPM
WBC # BLD AUTO: 4.5 X10*3/UL (ref 4.4–11.3)

## 2024-05-28 PROCEDURE — 83735 ASSAY OF MAGNESIUM: CPT | Performed by: STUDENT IN AN ORGANIZED HEALTH CARE EDUCATION/TRAINING PROGRAM

## 2024-05-28 PROCEDURE — 2500000001 HC RX 250 WO HCPCS SELF ADMINISTERED DRUGS (ALT 637 FOR MEDICARE OP): Performed by: STUDENT IN AN ORGANIZED HEALTH CARE EDUCATION/TRAINING PROGRAM

## 2024-05-28 PROCEDURE — 36430 TRANSFUSION BLD/BLD COMPNT: CPT

## 2024-05-28 PROCEDURE — 85025 COMPLETE CBC W/AUTO DIFF WBC: CPT | Performed by: STUDENT IN AN ORGANIZED HEALTH CARE EDUCATION/TRAINING PROGRAM

## 2024-05-28 PROCEDURE — 93005 ELECTROCARDIOGRAM TRACING: CPT

## 2024-05-28 PROCEDURE — 80069 RENAL FUNCTION PANEL: CPT

## 2024-05-28 PROCEDURE — A4217 STERILE WATER/SALINE, 500 ML: HCPCS

## 2024-05-28 PROCEDURE — 94760 N-INVAS EAR/PLS OXIMETRY 1: CPT

## 2024-05-28 PROCEDURE — 2500000002 HC RX 250 W HCPCS SELF ADMINISTERED DRUGS (ALT 637 FOR MEDICARE OP, ALT 636 FOR OP/ED): Mod: MUE

## 2024-05-28 PROCEDURE — 51701 INSERT BLADDER CATHETER: CPT

## 2024-05-28 PROCEDURE — 86850 RBC ANTIBODY SCREEN: CPT | Mod: 91 | Performed by: STUDENT IN AN ORGANIZED HEALTH CARE EDUCATION/TRAINING PROGRAM

## 2024-05-28 PROCEDURE — 71045 X-RAY EXAM CHEST 1 VIEW: CPT | Performed by: RADIOLOGY

## 2024-05-28 PROCEDURE — 99233 SBSQ HOSP IP/OBS HIGH 50: CPT

## 2024-05-28 PROCEDURE — 83605 ASSAY OF LACTIC ACID: CPT

## 2024-05-28 PROCEDURE — 2500000004 HC RX 250 GENERAL PHARMACY W/ HCPCS (ALT 636 FOR OP/ED): Performed by: STUDENT IN AN ORGANIZED HEALTH CARE EDUCATION/TRAINING PROGRAM

## 2024-05-28 PROCEDURE — 2500000004 HC RX 250 GENERAL PHARMACY W/ HCPCS (ALT 636 FOR OP/ED)

## 2024-05-28 PROCEDURE — 1200000003 HC ONCOLOGY  ROOM WITH TELEMETRY DAILY

## 2024-05-28 PROCEDURE — 36415 COLL VENOUS BLD VENIPUNCTURE: CPT

## 2024-05-28 PROCEDURE — P9040 RBC LEUKOREDUCED IRRADIATED: HCPCS

## 2024-05-28 PROCEDURE — 2500000005 HC RX 250 GENERAL PHARMACY W/O HCPCS: Performed by: STUDENT IN AN ORGANIZED HEALTH CARE EDUCATION/TRAINING PROGRAM

## 2024-05-28 PROCEDURE — 86923 COMPATIBILITY TEST ELECTRIC: CPT

## 2024-05-28 PROCEDURE — 97535 SELF CARE MNGMENT TRAINING: CPT | Mod: GO

## 2024-05-28 PROCEDURE — 87086 URINE CULTURE/COLONY COUNT: CPT

## 2024-05-28 PROCEDURE — 97530 THERAPEUTIC ACTIVITIES: CPT | Mod: GP | Performed by: PHYSICAL THERAPIST

## 2024-05-28 PROCEDURE — 71045 X-RAY EXAM CHEST 1 VIEW: CPT

## 2024-05-28 PROCEDURE — 87640 STAPH A DNA AMP PROBE: CPT | Mod: 91

## 2024-05-28 PROCEDURE — 87075 CULTR BACTERIA EXCEPT BLOOD: CPT | Mod: 91

## 2024-05-28 PROCEDURE — 97162 PT EVAL MOD COMPLEX 30 MIN: CPT | Mod: GP | Performed by: PHYSICAL THERAPIST

## 2024-05-28 PROCEDURE — 93010 ELECTROCARDIOGRAM REPORT: CPT | Performed by: INTERNAL MEDICINE

## 2024-05-28 PROCEDURE — 87635 SARS-COV-2 COVID-19 AMP PRB: CPT

## 2024-05-28 PROCEDURE — 82947 ASSAY GLUCOSE BLOOD QUANT: CPT

## 2024-05-28 PROCEDURE — 2500000001 HC RX 250 WO HCPCS SELF ADMINISTERED DRUGS (ALT 637 FOR MEDICARE OP)

## 2024-05-28 PROCEDURE — 97530 THERAPEUTIC ACTIVITIES: CPT | Mod: GO

## 2024-05-28 PROCEDURE — C9113 INJ PANTOPRAZOLE SODIUM, VIA: HCPCS | Performed by: STUDENT IN AN ORGANIZED HEALTH CARE EDUCATION/TRAINING PROGRAM

## 2024-05-28 RX ORDER — VANCOMYCIN HYDROCHLORIDE 1 G/20ML
INJECTION, POWDER, LYOPHILIZED, FOR SOLUTION INTRAVENOUS DAILY PRN
Status: DISCONTINUED | OUTPATIENT
Start: 2024-05-28 | End: 2024-05-28

## 2024-05-28 RX ORDER — WARFARIN 2 MG/1
TABLET ORAL
Qty: 90 TABLET | Refills: 1 | Status: SHIPPED | OUTPATIENT
Start: 2024-05-28 | End: 2024-06-11 | Stop reason: HOSPADM

## 2024-05-28 RX ORDER — MEROPENEM 1 G/1
1000 INJECTION, POWDER, FOR SOLUTION INTRAVENOUS EVERY 12 HOURS
Status: DISCONTINUED | OUTPATIENT
Start: 2024-05-28 | End: 2024-05-28

## 2024-05-28 RX ORDER — SODIUM CHLORIDE, SODIUM LACTATE, POTASSIUM CHLORIDE, CALCIUM CHLORIDE 600; 310; 30; 20 MG/100ML; MG/100ML; MG/100ML; MG/100ML
100 INJECTION, SOLUTION INTRAVENOUS CONTINUOUS
Status: ACTIVE | OUTPATIENT
Start: 2024-05-28 | End: 2024-05-29

## 2024-05-28 RX ORDER — METOPROLOL TARTRATE 25 MG/1
12.5 TABLET, FILM COATED ORAL 2 TIMES DAILY
Status: DISCONTINUED | OUTPATIENT
Start: 2024-05-28 | End: 2024-05-29

## 2024-05-28 RX ORDER — METOPROLOL TARTRATE 25 MG/1
12.5 TABLET, FILM COATED ORAL DAILY
Status: DISCONTINUED | OUTPATIENT
Start: 2024-05-28 | End: 2024-05-28

## 2024-05-28 RX ADMIN — METOPROLOL TARTRATE 12.5 MG: 25 TABLET, FILM COATED ORAL at 20:25

## 2024-05-28 RX ADMIN — ROSUVASTATIN CALCIUM 10 MG: 10 TABLET, FILM COATED ORAL at 20:25

## 2024-05-28 RX ADMIN — NYSTATIN 1 APPLICATION: 100000 POWDER TOPICAL at 09:48

## 2024-05-28 RX ADMIN — MEROPENEM 1000 MG: 1 INJECTION, POWDER, FOR SOLUTION INTRAVENOUS at 03:02

## 2024-05-28 RX ADMIN — PANTOPRAZOLE SODIUM 40 MG: 40 INJECTION, POWDER, FOR SOLUTION INTRAVENOUS at 09:48

## 2024-05-28 RX ADMIN — SODIUM CHLORIDE, POTASSIUM CHLORIDE, SODIUM LACTATE AND CALCIUM CHLORIDE 1000 ML: 600; 310; 30; 20 INJECTION, SOLUTION INTRAVENOUS at 02:05

## 2024-05-28 RX ADMIN — VANCOMYCIN HYDROCHLORIDE 1750 MG: 5 INJECTION, POWDER, LYOPHILIZED, FOR SOLUTION INTRAVENOUS at 03:42

## 2024-05-28 RX ADMIN — PANTOPRAZOLE SODIUM 40 MG: 40 INJECTION, POWDER, FOR SOLUTION INTRAVENOUS at 20:25

## 2024-05-28 RX ADMIN — METOPROLOL TARTRATE 12.5 MG: 25 TABLET, FILM COATED ORAL at 12:45

## 2024-05-28 RX ADMIN — ACETAMINOPHEN 1000 MG: 10 INJECTION INTRAVENOUS at 02:05

## 2024-05-28 RX ADMIN — Medication 5 MG: at 00:26

## 2024-05-28 RX ADMIN — Medication 4 L/MIN: at 20:00

## 2024-05-28 RX ADMIN — SODIUM CHLORIDE, POTASSIUM CHLORIDE, SODIUM LACTATE AND CALCIUM CHLORIDE 100 ML/HR: 600; 310; 30; 20 INJECTION, SOLUTION INTRAVENOUS at 17:28

## 2024-05-28 ASSESSMENT — COGNITIVE AND FUNCTIONAL STATUS - GENERAL
HELP NEEDED FOR BATHING: A LOT
WALKING IN HOSPITAL ROOM: TOTAL
DRESSING REGULAR UPPER BODY CLOTHING: A LOT
TURNING FROM BACK TO SIDE WHILE IN FLAT BAD: TOTAL
MOBILITY SCORE: 7
CLIMB 3 TO 5 STEPS WITH RAILING: TOTAL
STANDING UP FROM CHAIR USING ARMS: TOTAL
DAILY ACTIVITIY SCORE: 14
PERSONAL GROOMING: A LITTLE
MOVING TO AND FROM BED TO CHAIR: TOTAL
DRESSING REGULAR LOWER BODY CLOTHING: A LOT
TOILETING: A LOT
EATING MEALS: A LITTLE
MOVING FROM LYING ON BACK TO SITTING ON SIDE OF FLAT BED WITH BEDRAILS: A LOT

## 2024-05-28 ASSESSMENT — ACTIVITIES OF DAILY LIVING (ADL)
LACK_OF_TRANSPORTATION: NO
HOME_MANAGEMENT_TIME_ENTRY: 20
ADL_ASSISTANCE: NEEDS ASSISTANCE

## 2024-05-28 ASSESSMENT — PAIN SCALES - GENERAL
PAINLEVEL_OUTOF10: 0 - NO PAIN
PAINLEVEL_OUTOF10: 0 - NO PAIN

## 2024-05-28 ASSESSMENT — PAIN - FUNCTIONAL ASSESSMENT: PAIN_FUNCTIONAL_ASSESSMENT: 0-10

## 2024-05-28 NOTE — SIGNIFICANT EVENT
05/28/24 0135 05/28/24 0144 05/28/24 0150   Vital Signs   Temp 37.5 °C (99.5 °F)  --  (!) 38.1 °C (100.6 °F)   Temp Source Temporal  --  Axillary   Heart Rate (!) 130 (!) 128  --    Heart Rate Source Monitor  --   --    Resp (!) 36  --   --    /77 101/61  --       05/28/24 0217   Vital Signs   Temp 37.6 °C (99.6 °F)   Temp Source  --    Heart Rate (!) 115   Heart Rate Source Monitor   Resp (!) 28   BP 93/56       Patients HR repeatedly going up to high 140's and patient showing increased confusion. RN notified Dr. Fulton of above. EKG ordered. When RN entered room to complete EKG patient was tachypneic with RR 36 and had also pulled out his NG tube. Rapid response called, and provider came to bedside. Blood and urine cultures drawn, as well as COVID swab. LR bolus given and antibiotics started. Patient also endorsing excessive thirst, BS was 118. Patients wife at bedside. Will continue to monitor.

## 2024-05-28 NOTE — PROGRESS NOTES
Vancomycin Dosing by Pharmacy- Cessation of Therapy    Consult to pharmacy for vancomycin dosing has been discontinued by the prescriber, pharmacy will sign off at this time.    Please call pharmacy if there are further questions or re-enter a consult if vancomycin is resumed.     Cris Werner, JohannaD

## 2024-05-28 NOTE — PROGRESS NOTES
Occupational Therapy    OT Treatment    Patient Name: Vinicius Arriola  MRN: [id number]  Today's Date: 5/28/2024  Time Calculation  Start Time: 1220  Stop Time: 1258  Time Calculation (min): 38 min         Assessment:  End of Session Communication: Bedside nurse  End of Session Patient Position: Bed, 3 rail up, Alarm on  OT Assessment Results: Decreased ADL status, Decreased upper extremity strength, Decreased endurance, Decreased cognition, Decreased functional mobility, Decreased IADLs    Plan:  Treatment Interventions: ADL retraining, Functional transfer training, UE strengthening/ROM, Endurance training, Cognitive reorientation, Patient/family training, Equipment evaluation/education, Compensatory technique education  OT Frequency: 3 times per week  OT Discharge Recommendations: Moderate intensity level of continued care    Subjective   Previous Visit Info:  OT Last Visit  OT Received On: 05/28/24    General:  General  Reason for Referral: admitted to [facility] on 5/16 with nausea/vomiting and urinary retention after half dose chemo the previous day.   Was found to be hypotensive/tachycardic; Significant collapse of the left lung  Past Medical History Relevant to Rehab: PAD, afib/flutter, hx DVT on warfarin, HTN, CAD s/p stent, mitral regurg, HLD, polycythemia vera, BCC s/p excision, SVC thrombus and malignant mesothelioma s/p L VATS with decort 5/2022, XRT 9-10/2022 due to poor performance status and monitored w/ surveillance, w/ disease recurrence/progression s/p half dose chemo  Family/Caregiver Present: Yes  Caregiver Feedback: Pt's wife bedside througout; pleasant and supportive  Co-Treatment: PT  Co-Treatment Reason: to maxmize pt safety and indep while targeting discipline specific tasks  Prior to Session Communication: Bedside nurse  Patient Position Received: Bed, 3 rail up, Alarm on  General Comment: Pt supine in bed upon arrival, willing to participate in therapy; motivated for OOB  activity.    Precautions:  Medical Precautions: Oxygen therapy device and L/min, Fall precautions  Precautions Comment: 3L NC O2    Vital Signs:  Vital Signs  Heart Rate:  (107-123 (supine, EOB, and sit-stand))  SpO2: 97 %    Objective    Cognition:  Cognition  Arousal/Alertness: Appropriate responses to stimuli  Orientation Level:  (VCs for date; A&O otherwise)  Following Commands: Follows one step commands with repetition  Insight: Mild  Processing Speed: Delayed    Bed Mobility/Transfers:   Bed Mobility 1  Bed Mobility 1: Supine to sitting, Sitting to supine  Level of Assistance 1: Maximum assistance (x2)  Bed Mobility Comments 1: VCs for body mechanics and safety; HOB elevated; use of drawsheet and bedrail    Bed Mobility 2  Bed Mobility  2: Rolling right, Rolling left  Level of Assistance 2: Maximum assistance  Bed Mobility Comments 2: VCs for body mechanics; use of bedrail    Bed Mobility 3  Bed Mobility 3: Scooting  Level of Assistance 3: Maximum assistance  Bed Mobility Comments 3: use of drawsheet; assist at B hips    Bed Mobility 4  Bed Mobility 4:  (Boost towards HOB)  Level of Assistance 4: Maximum assistance (x2)  Bed Mobility Comments 4: use of drawsheet    Transfer 1  Technique 1: Sit to stand, Stand to sit  Transfer Device 1: Walker  Transfer Level of Assistance 1: Maximum assistance (x2)  Trials/Comments 1: VCs for hand placement and body mechanics- L lean at trunk noted; ~75% clearance of hips off EOB; tolerates ~15 second stance    Sitting Balance:  Dynamic Sitting Balance  Dynamic Sitting-Comments: EOB ~15 minutes to address functional balance, activity tolerance, pulmonary hygiene, and BADLs with CGA-MIN A at trunk-- intermittent L lateral lean noted, self-corrected upon VCs    Therapy/Activity:   Therapeutic Activity  Therapeutic Activity 1: Pt demos mild-moderate SOB while seated EOB-- OT facilitates mindful breathing throughout; pt receptive and VSS    Outcome Measures:  [facility] Daily  Activity  Putting on and taking off regular lower body clothing: A lot  Bathing (including washing, rinsing, drying): A lot  Putting on and taking off regular upper body clothing: A lot  Toileting, which includes using toilet, bedpan or urinal: A lot  Taking care of personal grooming such as brushing teeth: A little  Eating Meals: A little  Daily Activity - Total Score: 14    Education Documentation  Body Mechanics, taught by Miguelina Mullen OT at 5/28/2024  1:30 PM.  Learner: Patient  Readiness: Acceptance  Method: Explanation  Response: Verbalizes Understanding    ADL Training, taught by Miguelina Mullen, OT at 5/28/2024  1:30 PM.  Learner: Patient  Readiness: Acceptance  Method: Explanation  Response: Verbalizes Understanding    Education Comments  No comments found.      Goals:  Encounter Problems       Encounter Problems (Active)       ADLs       Patient will perform UB and LB bathing  with modified independent level of assistance and bedside commode. (Progressing)       Start:  05/20/24    Expected End:  06/10/24            Patient with complete lower body dressing with modified independent level of assistance donning and doffing all LE clothes  with PRN adaptive equipment while edge of bed  (Progressing)       Start:  05/20/24    Expected End:  06/10/24            Patient will complete toileting including hygiene clothing management/hygiene with minimal assist  level of assistance and grab bars. (Progressing)       Start:  05/20/24    Expected End:  06/10/24               BALANCE       Pt will maintain dynamic standing balance during ADL task with minimal assist  level of assistance in order to demonstrate decreased risk of falling and improved postural control. (Progressing)       Start:  05/20/24    Expected End:  06/10/24            Patient will tolerate standing for 4 minutes to minimal assist  level of assistance with least restrictive device in order to improve functional activity tolerance for  ADL tasks. (Progressing)       Start:  05/20/24    Expected End:  06/10/24               COGNITION/SAFETY       Patient will score WFL on standardized cognitive assessment with min cues and within reasonable time frame (Progressing)       Start:  05/20/24    Expected End:  06/10/24               MOBILITY       Patient will perform Functional mobility min Household distances/Community Distances with minimal assist  level of assistance and least restrictive device in order to improve safety and functional mobility. (Progressing)       Start:  05/20/24    Expected End:  06/10/24               TRANSFERS       Patient will perform bed mobility minimal assist  level of assistance and bed rails in order to improve safety and independence with mobility (Progressing)       Start:  05/20/24    Expected End:  06/10/24                   ---------------  Miguelina Mullen (OTR/L, OTD)  Inpatient Occupational Therapist   Rehab Office: [phone]

## 2024-05-28 NOTE — PROGRESS NOTES
"Subjective     Overnight events:  Patient was evaluated at the bedside with his wife present. Overnight patient had an episode afib w/ RVR (140s) with SBP in the 90s and spiked a low grade fever. He was delirious per report and pulled out his NG tube. On interview this morning he denies any symptoms at present including CP, SOB, N/V, palpitations. No blood noted on exam. Has mild cough which he reports is mild.      Vital signs:  Blood pressure 120/77, pulse 108, temperature 36.3 °C (97.3 °F), temperature source Temporal, resp. rate 24, height 1.727 m (5' 7.99\"), weight 121 kg (266 lb 4.8 oz), SpO2 98%.    I/O last 3 completed shifts:  In: 1000 (8.3 mL/kg) [IV Piggyback:1000]  Out: 1250 (10.3 mL/kg) [Urine:1250 (0.3 mL/kg/hr)]  Weight: 120.8 kg     Physical exam:  Physical Exam  Constitutional:       General: He is not in acute distress.  HENT:      Head: Normocephalic and atraumatic.   Eyes:      Extraocular Movements: Extraocular movements intact.   Pulmonary:      Effort: Pulmonary effort is normal.      Breath sounds: No wheezing or rhonchi. Rales: Mild rales on right.     Comments: Decreased breath sounds on left  Abdominal:      General: Bowel sounds are normal. There is distension.      Palpations: Abdomen is soft.   Musculoskeletal:      Comments: LLE 1+ edema improving erythema (margins decreasing), No RLE   Neurological:      General: No focal deficit present.      Mental Status: He is oriented to person, place, and time.         Relevant Results        Labs:  Last CBC:  Lab Results   Component Value Date    WBC 4.5 05/28/2024    HGB 7.3 (L) 05/28/2024    HCT 22.0 (L) 05/28/2024    MCV 83 05/28/2024    PLT 42 (L) 05/28/2024       Last RFP:  Lab Results   Component Value Date    GLUCOSE 94 05/28/2024    CALCIUM 7.9 (L) 05/28/2024     05/28/2024    K 3.5 05/28/2024    CO2 23 05/28/2024     05/28/2024    BUN 66 (H) 05/28/2024    CREATININE 3.14 (H) 05/28/2024       Last LFTs:  Lab Results "   Component Value Date    ALT 18 05/25/2024    AST 33 05/25/2024    ALKPHOS 67 05/25/2024    BILITOT 0.6 05/25/2024       Last coags:  Lab Results   Component Value Date    INR 1.1 05/26/2024    APTT 25 (L) 05/23/2024       Micro/culture data:  No results found for the last 90 days.      Imaging:  XR chest 1 view  Narrative: Interpreted By:  Jovany Nuñez,   STUDY:  XR CHEST 1 VIEW;  5/28/2024 8:03 am      INDICATION:  Signs/Symptoms:Rule out infection.      COMPARISON:  Chest radiograph dated 5/27/2024      ACCESSION NUMBER(S):  [id number]      ORDERING CLINICIAN:  ALEXANDRA AVILA      FINDINGS:  AP radiograph of the chest      Pacemaker electrodes appear in adequate position. There is virtually  complete opacification of the left thorax. The heart mediastinum are  shifted to the left indicating volume loss. Compensatory  hyperaeration of the right lung and pulmonary vascular shunting to  the right lung is noted increased from previous study.          Impression: 1. Virtually complete opacification of the left thorax with heart and  mediastinum shifted to the left indicating volume loss  2. Compensatory pulmonary vascular shunting to the right lung              Signed by: Jovany Nuñez 5/28/2024 10:38 AM  Dictation workstation:   [id number]  Electrocardiogram, 12-lead PRN ACS symptoms  Atrial fibrillation with rapid ventricular response  Nonspecific ST and T wave abnormality , probably digitalis effect  Abnormal ECG  When compared with ECG of 28-MAY-2024 01:39, (unconfirmed)  No significant change was found  Confirmed by Jovanny Loera ([id number]) on 5/28/2024 10:17:48 AM         Assessment/Plan   Principal Problem:    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Multi)  Active Problems:    Anemia    Vinicius Arriola is a 88 y.o. male with PMHx of PAD, afib/flutter, hx DVT on warfarin, HTN, CAD s/p stent, mitral regurg, HLD, polycythemia vera, managed by Dr. 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 Presented to [place] 5/14 with abdominal pain, fever, diarrhea, urinary retention c/f sepsis presumed 2/2 PNA; CT CAP showed known LLL collapse 2/2 mesothelioma, new post L chest wall invasion, and c/f L pulmonary trunk tumor thrombus.  5/23 developed shock and acute hypoxic resp failure requiring MICU transfer for pressors and NIV (airvo), thought 2/2 GI bleeding with melena and coffee ground drainage from NGT. Quickly weaned off pressors, s/p flex sig with blood in rectum but no active bleed. Now transferred back to floor for further mgmt.      Updates:   - Restart home Metop tartrate at 12.5mg, will increase as tolerated.  - Discussed with inpatient Oncology team regarding mesothelioma and lung collapse; they recommended reaching out to Dr. Ann (Primary  Oncologist). Email sent.  - IR consulted regarding L sided Thora  - Fluid repletion, encourage PO intake  - 1 uPRBC goal hgb above 8    #History Afib, HLD, CAD, PAD  #History DVT/PE on Warfarin  :: 5/21 TTE showing:  Left ventricular systolic function is hyperdynamic with a 70-75% estimated ejection fraction, Mildly elevated RVSP, Moderately dilated aortic root   Continues to have episodes of Afib w/ RVR likely multifactorial  - Restart Metop tartrate at 12.5mg, will increase as tolerated. (Home metoprolol 25 mg twice daily)  - Holding warfarin, aspirin  - Continue home statin    #Worsening mesothelioma  #Tumor Thrombus  #Mesothelioma with Loculated Pleural Effusions   #Left Hilar Mass, Left Pulmonary Trunk Mass  #History JVOANNI, Pulmonary Embolism  :: primary oncologist Dr. Galvan  :: s/p L VATS w/ decort 5/2022, XRT 9-10/22  :: s/p \"half-dose\" pemfexy on 5/14  :: CT chest 5/18 with significant collapse of left lung, loculated pleural effusions consistent with patient's known mesothelioma.  Also shows ill-defined patchy infiltrate " in right lower lobe.  Shows left hilar mass with extension into left pulmonary trunk likely tumor thrombus   - Holding warfarin, continue to monitor   - Dr. Henderson recommends transitioning to Eliquis on discharge, deferring anticoagulation iso thrombocytopenia. Consider 2.5mg BID vs 5mg BID on discharge (goal plt above 50)  - continuing protocol w/ 1 mg folic acid daily      #Coffee grounds via NGT (placed 5/21), improved   #Melena likely d/t GI bleed  :: KUB 5/21, showing moderate distention of stomach with nonspecific predominantly fluid-filled bowel pattern.  :: 5/20-5/21 patient emesis that is watery, dark brown, malodorous   :: did initially present w/ diarrhea could have been chemo induced vs stool ball  :: INR 4.9>6.3>2.4 >1.2  :: s/p Vitamin K 10 mg  :: KUB w/ dilated bowel loops   - Soft diet, encouraged patient to go slow  - holding ferrous sulfate    - pantoprazole 40 bid   - GI consulted, appreciate recs  -s/p flex sig with no evidence of active bleed              -Will hold off EGD since coffee ground emesis resolved     #Illeus (improved/resolved)  :: KUB 5/26 kub shows distended bowel loops likely ileus, will monitor for improvement with PO diet.   - NG tube clamped; If becoming nauseous or developing vomiting, can unclamp NGT and place to LIWS.  - Tolerating PO and having BM. Will CTM     #Pneumonia, Right lower lobe ill-defined patchy infiltrate  :: Resp Cult salivary contamination  :: MRSA nares: negative  :: Strep/Legionella antigens: negative  :: CXR 5/21 showing persistent patchy right infrahilar airspace opacities    :: 5/22 CT CAP w/o contrast: Interval improvement of the right lower lobe patchy infiltrate  :: s/p Azithro (5/16-5/18)  :: s/p vanc (5/16-5/18) (5/20-5/22) and zosyn (5/16-5/18) (5/20-22)  -eropenem (5/19-5/20) (5/22-25) and micafungin (5/22-25)  -Abx stopped 5/25 given prolonged course with improvement/decline independent of abx, do not suspect active infection currently. Restart  if decompensating     #Tachypnea   #Elevated A-a gradient   #AHRF (Improved)  :: multifactorial, like d/t shunt secondary to malignancy vs pneumonia vs v/q mismatch   :: RR 23-44   :: 5/22 ABG: pH 7.53, pCO2 31, pO2 109, Lactate 2.2, FiO2 40.  - On 3L nasal cannula with intermittent AIRVO (5/22-) to assist with work of breathing   - gradually wean supplemental O2 for SpO2 goal >90%   - continue abx regimen  - CTM      #Fever  #Sepsis most likely d/t pneumonia  :: at OSH met SIRS criteria (febrile, tachypnea)  :: UA 5/18 with small LE, 3+ bacteria, urine cx 5/21 negative  :: Resp Cult salivary contamination  :: MRSA nares: negative  :: Strep/Legionella antigens: negative  :: c diff and enteric stool panel: negative  :: BC 5/16 NGTD, repeat BC 5/20 NGTD  :: CRP 30.46>42.36  :: CXR 5/21 showing persistent patchy right infrahilar airspace opacities    :: s/p Azithro (5/16-5/18)  :: asymmetric warmth, erythema, edema noted in left lower extremity   :: s/p vanc (5/16-5/18) (5/20-5/25) and zosyn (5/16-5/18) (5/20-22)  :: 5/22 CT CAP w/o contrast: Interval improvement of the right lower lobe patchy infiltrate, Interval flattening of the inferior vena cava which may be seen with hypovolemia, Improved proximal sigmoid diverticulitis.  - repeat blood cultures drawn 5/22, NGTD   - meropenem (5/19-5/20) (5/22-5/25) and micafungin (5/22-5/25); restarted Vanc/Sofy briefly overnight 5/27.     #Lower leg skin changes likely d/t cellulitis (improving)  - improved margins of left lower extremity skin changes, will ctm      #Anemia, improved  :: Likely 2/2 GIB noted this admission  :: Melena and coffee grounds via NGT noted 5/22  :: Hgb initially 10, decreased to 6.9 on 5/23, now stable since 5/24  - S/p 1u pRBC 5/23  - Continue to trend Hgb BID (goal > 8), platelets goal > 20. Platelet goal liberalized as no active bleeding, if hgb decreasing or bleeding clinically then transfuse to goal 50        #VIK on CKD, likely prerenal d/t  fluid status   # CKD (baseline Cr 1.3-1.4)  # urine retention s/p Boyd Catheter Placement by Urology in OSH  Estimated Creatinine Clearance: 21.4 mL/min (A) (by C-G formula based on SCr of 3.14 mg/dL (H)).  :: US renal 5/16: Nonobstructing 1 cm left renal calculus, no hydronephrosis  :: Cr 1.64>2.26>3.00 (Baseline Cr 1.3-1.4)  :: Urine electrolytes pre-renal, Net negative, hypernatremia with prolonged NPO all suggest pre-renal hypovolemic VIK. Will encourage PO intake  - will eventually need boyd removal and trial  - strict in/out  - tamsulosin 0.4 mg daily  -minimize nephrotoxic medications as able  - boyd removed 5/23/24 (5/16-5/23)     #Pancytopenia, most likely medication induced from pemfexy, resolved  #Thrombocytopenia, improved  :: c/f TTP, blood smear pending   ::   :: s/p pemfexy 5/14  :: d dimer 1521, fibrinogen >1000- no c/f dic  :: no schistocytes on smear  ::   :: filgrastim 480 mcg (5/21-5/22)  - 5/23 ANC 4130   - haptoglobin 400   - Platelets 55>38, ctm. Goal >10  - s/p 1u platelets 5/23 (was bleeding at the time)     #Elevated INR  :: INR 4.9>6.3>2.4 >1.2  :: s/p Vitamin K 10 mg  -CTM    # Hospital-acquired Delirium  :: waxing and waning altered mental status  -Delirium precautions  -hold home trazodone for insomnia      Antibiotics:: none  Dispo: SNF.      F: Replete PRN  E: Replete PRN  N: Regular Diet  DVT Ppx: None iso of low Plt  GI Ppx: Pantoprazole   Access/Lines: PIV x3  Boyd External (5/23- )  Abx: None  O2: 3L NC    Pain regimen: Tylenol / ASA / Oxy  GI Laxative: None / Docusate/Senna / Miralax     Code status: DNR and No Intubation  Emergency contact:Kirsten Arriola (Spouse) [phone]        Ramesh Conley MD  PGY-1 Internal Medicine

## 2024-05-28 NOTE — CARE PLAN
Problem: Fall/Injury  Goal: Verbalize understanding of personal risk factors for fall in the hospital  Outcome: Progressing  Goal: Verbalize understanding of risk factor reduction measures to prevent injury from fall in the home  Outcome: Progressing  Goal: Use assistive devices by end of the shift  Outcome: Progressing  Goal: Pace activities to prevent fatigue by end of the shift  Outcome: Progressing     Problem: Skin  Goal: Promote/optimize nutrition  Outcome: Progressing  Goal: Promote skin healing  Outcome: Progressing

## 2024-05-28 NOTE — CARE PLAN
Problem: Fall/Injury  Goal: Verbalize understanding of personal risk factors for fall in the hospital  Outcome: Progressing  Goal: Verbalize understanding of risk factor reduction measures to prevent injury from fall in the home  Outcome: Progressing  Goal: Use assistive devices by end of the shift  Outcome: Progressing  Goal: Pace activities to prevent fatigue by end of the shift  Outcome: Progressing     Problem: Skin  Goal: Promote/optimize nutrition  Outcome: Progressing  Flowsheets (Taken 5/28/2024 1331)  Promote/optimize nutrition: Assist with feeding  Goal: Promote skin healing  Outcome: Progressing  Flowsheets (Taken 5/28/2024 1331)  Promote skin healing: Turn/reposition every 2 hours/use positioning/transfer devices

## 2024-05-28 NOTE — CONSULTS
Vancomycin Dosing by Pharmacy- INITIAL    Vinicius Arriola is a 88 y.o. year old male who Pharmacy has been consulted for vancomycin dosing for pneumonia. Based on the patient's indication and renal status this patient will be dosed based on a goal trough/random level of 15-20.     Renal function is currently declining. Dose by level, patient was on vancomycin therapy until  and is now being restarted.    Visit Vitals  BP 93/56   Pulse (!) 115   Temp 37.6 °C (99.6 °F)   Resp (!) 28        Lab Results   Component Value Date    CREATININE 3.10 (H) 2024    CREATININE 3.56 (H) 2024    CREATININE 3.62 (H) 2024    CREATININE 3.55 (H) 2024        Patient weight is as follows:   Vitals:    24 0542   Weight: 109 kg (239 lb 12.8 oz)       Cultures:  No results found for the encounter in last 14 days.        I/O last 3 completed shifts:  In: 1000 (9.2 mL/kg) [IV Piggyback:1000]  Out: 1625 (14.9 mL/kg) [Urine:1275 (0.3 mL/kg/hr); Emesis/NG output:350]  Weight: 108.8 kg   I/O during current shift:  No intake/output data recorded.    Temp (24hrs), Av.8 °C (98.2 °F), Min:35.3 °C (95.5 °F), Max:38.1 °C (100.6 °F)         Assessment/Plan     Patient will not be given a loading dose since recent doses of vanco and poor renal function  Will give 1750 mg x1 dose  Follow-up level will be ordered on  at AM labs unless clinically indicated sooner.  Will continue to monitor renal function daily while on vancomycin and order serum creatinine at least every 48 hours if not already ordered.  Follow for continued vancomycin needs, clinical response, and signs/symptoms of toxicity.       Kalen Royal, PharmD

## 2024-05-28 NOTE — SIGNIFICANT EVENT
Significant Event Note    Overnight coverage called to bedside at approximately 2:00AM by nurse due to tachycardia up to 140s, concerning for afib w RVR which has been an ongoing issue throughout admission. Nursing also reports that patient has been more delirious this evening than previously and removed his NG tube as well.     Vitals during this time:  Febrile to 100.6F  -130s  RR 28-36  BP 90-100s/50-70s  97% on 3L NC O2     Interventions:  - 1L bolus LR ordered to be give over 2hrs, as instructed per sign out  - Repeated blood cultures x2, urine culture  - COVID swab ordered  - Repeat CBC, RFP  - Lactate ordered  - CXR ordered  - Restarted abx: Vancomycin + meropenem    Louis Willard MD  PGY-1 Internal Medicine

## 2024-05-28 NOTE — PROGRESS NOTES
Physical Therapy    Physical Therapy Evaluation & Treatment    Patient Name: Vinicius Arriola  MRN: [id number]  Today's Date: 5/28/2024   Time Calculation  Start Time: 1220  Stop Time: 1258  Time Calculation (min): 38 min    Assessment/Plan   PT Assessment  PT Assessment Results: Decreased strength, Decreased endurance, Impaired balance, Decreased mobility, Decreased safety awareness  Rehab Prognosis: Good  End of Session Communication: Bedside nurse  End of Session Patient Position: Bed, 3 rail up, Alarm on   IP OR SWING BED PT PLAN  Inpatient or Swing Bed: Inpatient  PT Plan  Treatment/Interventions: Bed mobility, Transfer training, Gait training, Stair training, Balance training, Strengthening, Endurance training, Therapeutic exercise, Therapeutic activity  PT Plan: Skilled PT  PT Frequency: 3 times per week  PT Discharge Recommendations: Moderate intensity level of continued care  PT Recommended Transfer Status: Assist x2, Assistive device  PT - OK to Discharge: Yes      Subjective     General Visit Information:  General  Reason for Referral: admitted to [facility] on 5/16 with nausea/vomiting and urinary retention after half dose chemo the previous day.   Was found to be hypotensive/tachycardic; Significant collapse of the left lung  Past Medical History Relevant to Rehab: PAD, afib/flutter, hx DVT on warfarin, HTN, CAD s/p stent, mitral regurg, HLD, polycythemia vera, BCC s/p excision, SVC thrombus and malignant mesothelioma s/p L VATS with decort 5/2022, XRT 9-10/2022 due to poor performance status and monitored w/ surveillance, w/ disease recurrence/progression s/p half dose chemo  Family/Caregiver Present: Yes (spouse present and was supportive)  Co-Treatment: OT  Co-Treatment Reason: Cotx with OT for pt's safety with mobility  Prior to Session Communication: Bedside nurse  Patient Position Received: Bed, 3 rail up, Alarm on  Preferred Learning Style: verbal  General Comment: Pt supine in bed upon arrival,  willing to participate in therapy. Fatigue/SOB with mobility.  Home Living:  Home Living  Type of Home: House  Lives With: Spouse  Home Adaptive Equipment: Walker rolling or standard  Home Layout: One level  Home Access: Stairs to enter with rails  Entrance Stairs-Number of Steps: 3 (1 handrail)  Bathroom Shower/Tub: Walk-in shower  Prior Level of Function:  Prior Function Per Pt/Caregiver Report  Level of [place]:  (Lai with transfers and ambulation with walker)  Receives Help From: Family  ADL Assistance: Needs assistance  Ambulatory Assistance:  (Lai with walker)  Hand Dominance: Right  Precautions:  Precautions  Medical Precautions: Oxygen therapy device and L/min, Fall precautions (denies falls)  Precautions Comment: 3L NC O2  Vital Signs:  Vital Signs  Heart Rate:  (HR ranged 107 bpm to 123 bpm)  Heart Rate Source: Monitor  SpO2: 97 %  Patient Position: Sitting    Objective   Pain:  Pain Assessment  Pain Assessment: 0-10  Pain Score: 0 - No pain  Cognition:  Cognition  Orientation Level:  (A&Ox3, VC's for date)  Following Commands: Follows one step commands with repetition    General Assessments:       Sensation  Light Touch: No apparent deficits    Strength  Strength Comments: RLE 4/5 and LLE 2-/5 to 3-/5               Static Sitting Balance  Static Sitting-Level of Assistance: Contact guard  Dynamic Sitting Balance  Dynamic Sitting-Balance:  (CGA-Weston due to left lateral lean, seated EOB-15 mins)    Static Standing Balance  Static Standing-Level of Assistance:  (MaxAx2 with walker, only 75% stance for 15 secs)  Functional Assessments:  Bed Mobility  Bed Mobility: Yes  Bed Mobility 1  Bed Mobility 1: Supine to sitting, Sitting to supine  Level of Assistance 1: Maximum assistance, +2, Moderate verbal cues  Bed Mobility Comments 1: HOB elevated, bedrail, draw sheet  Bed Mobility 2  Bed Mobility  2: Rolling right, Rolling left  Level of Assistance 2: Maximum assistance, Minimal verbal cues  Bed Mobility  Comments 2: cues for sequencing and us eof bedail rolling done for hygiene  Bed Mobility 3  Bed Mobility 3: Scooting  Level of Assistance 3: Maximum assistance, +2  Bed Mobility Comments 3: to HOB and fwd scooting, draw sheet    Transfers  Transfer: Yes  Transfer 1  Transfer From 1: Sit to, Stand to  Transfer to 1: Sit, Stand  Technique 1: Sit to stand, Stand to sit  Transfer Device 1: Walker  Transfer Level of Assistance 1: Maximum assistance, +2, Moderate verbal cues, Moderate tactile cues  Trials/Comments 1: cues for hand placement and sequencing, L lateral lean noted, able to complete only 75% stance             Treatments:     Bed Mobility  Bed Mobility: Yes  Bed Mobility 1  Bed Mobility 1: Supine to sitting, Sitting to supine  Level of Assistance 1: Maximum assistance, +2, Moderate verbal cues  Bed Mobility Comments 1: HOB elevated, bedrail, draw sheet  Bed Mobility 2  Bed Mobility  2: Rolling right, Rolling left  Level of Assistance 2: Maximum assistance, Minimal verbal cues  Bed Mobility Comments 2: cues for sequencing and us eof bedail rolling done for hygiene  Bed Mobility 3  Bed Mobility 3: Scooting  Level of Assistance 3: Maximum assistance, +2  Bed Mobility Comments 3: to HOB and fwd scooting, draw sheet       Transfers  Transfer: Yes  Transfer 1  Transfer From 1: Sit to, Stand to  Transfer to 1: Sit, Stand  Technique 1: Sit to stand, Stand to sit  Transfer Device 1: Walker  Transfer Level of Assistance 1: Maximum assistance, +2, Moderate verbal cues, Moderate tactile cues  Trials/Comments 1: cues for hand placement and sequencing, L lateral lean noted, able to complete only 75% stance  Outcome Measures:  [facility] Basic Mobility  Turning from your back to your side while in a flat bed without using bedrails: A lot  Moving from lying on your back to sitting on the side of a flat bed without using bedrails: Total  Moving to and from bed to chair (including a wheelchair): Total  Standing up from a chair using  your arms (e.g. wheelchair or bedside chair): Total  To walk in hospital room: Total  Climbing 3-5 steps with railing: Total  Basic Mobility - Total Score: 7    Encounter Problems       Encounter Problems (Active)       Balance       Pt will tolerate standing for 2 mins with Weston with walker support (Progressing)       Start:  05/28/24    Expected End:  06/10/24               Mobility       Pt will be Weston for ambulation 25 ft with RW (Progressing)       Start:  05/28/24    Expected End:  06/10/24            Pt will be Weston to ascend/descend 3 steps with 1 handrail (Progressing)       Start:  05/28/24    Expected End:  06/10/24               PT Transfers       Pt will be [place]on for sit to stand and bed chair transfers with RW (Progressing)       Start:  05/28/24    Expected End:  06/10/24            Pt will be [place] for bed mobility (Progressing)       Start:  05/28/24    Expected End:  06/10/24                   Education Documentation  Mobility Training, taught by Lianet Comer PT at 5/28/2024  1:54 PM.  Learner: Patient  Readiness: Acceptance  Method: Explanation, Demonstration  Response: Verbalizes Understanding, Needs Reinforcement    Education Comments  No comments found.

## 2024-05-28 NOTE — SIGNIFICANT EVENT
05/28/24 0250   Onset Documentation   Rapid Response Initiated By Radar auto page   Location/Room [facility]  ([id number])   Pager Time 0141   Arrival Time 0155   Event End Time 0218   Level II Called No     Radar Auto page score of 8  T 37.5 C; ; RR 36; /77; SpO2 96% supplemental O2 @ 3L via NC    Primary team resident (Debby) at bedside on arrival. Patient temp reassessed 100.9 F axillary. Patient awake, alert to verbal stimuli and wife at bedside. Cardiac rhythm on EKG: a fib, rate of 124. Concern for sepsis and orders placed to obtain blood cultures x 2, urine specimen (straight cath) and MD stated will look into patient's antibiotics. RLE erythematous, localized and concern mentioned for cellulitis. Will place new marker boarder lines around reddened area. Patient has Tylenol IVP orders for temperature. 1L LR bolus order placed and CXR to be obtained per primary team. Meropenum and Vanc (Rph to dose) orders placed. Division nursing encouraged to call Rapid Response Team if patient deteriorates. Continuous telemetry and SpO2 monitoring in place. Patient to remain on current division. DNR/DNI.

## 2024-05-28 NOTE — PROGRESS NOTES
05/28/24 1300   Discharge Planning   Living Arrangements Spouse/significant other   Support Systems Spouse/significant other;Children   Type of Residence Private residence   Number of Stairs to Enter Residence 3   Do you have animals or pets at home? No   Who is requesting discharge planning? Provider   Home or Post Acute Services Post acute facilities (Rehab/SNF/etc)   Type of Post Acute Facility Services Skilled nursing   Financial Resource Strain   How hard is it for you to pay for the very basics like food, housing, medical care, and heating? Not very   Housing Stability   In the last 12 months, was there a time when you were not able to pay the mortgage or rent on time? N   In the last 12 months, how many places have you lived? 1   In the last 12 months, was there a time when you did not have a steady place to sleep or slept in a shelter (including now)? N   Transportation Needs   In the past 12 months, has lack of transportation kept you from medical appointments or from getting medications? no   In the past 12 months, has lack of transportation kept you from meetings, work, or from getting things needed for daily living? No     Patient is still thrombocytopenic, had a Rapid last night with A-fib RVR. Fluid Resuscitation and getting 1 unit of blood today. ADOD TBD. Will continue to assist with discharge needs. Meri Trujillo RN.

## 2024-05-29 ENCOUNTER — APPOINTMENT (OUTPATIENT)
Dept: RADIOLOGY | Facility: HOSPITAL | Age: 89
End: 2024-05-29
Payer: MEDICARE

## 2024-05-29 LAB
ALBUMIN SERPL BCP-MCNC: 2.4 G/DL (ref 3.4–5)
ALP SERPL-CCNC: 62 U/L (ref 33–136)
ALT SERPL W P-5'-P-CCNC: 10 U/L (ref 10–52)
ANION GAP SERPL CALC-SCNC: 16 MMOL/L (ref 10–20)
AST SERPL W P-5'-P-CCNC: 35 U/L (ref 9–39)
BACTERIA UR CULT: NO GROWTH
BASOPHILS # BLD MANUAL: 0 X10*3/UL (ref 0–0.1)
BASOPHILS NFR BLD MANUAL: 0 %
BILIRUB SERPL-MCNC: 0.6 MG/DL (ref 0–1.2)
BLOOD EXPIRATION DATE: NORMAL
BUN SERPL-MCNC: 58 MG/DL (ref 6–23)
BURR CELLS BLD QL SMEAR: ABNORMAL
CALCIUM SERPL-MCNC: 8 MG/DL (ref 8.6–10.6)
CHLORIDE SERPL-SCNC: 106 MMOL/L (ref 98–107)
CO2 SERPL-SCNC: 24 MMOL/L (ref 21–32)
CREAT SERPL-MCNC: 2.93 MG/DL (ref 0.5–1.3)
DISPENSE STATUS: NORMAL
EGFRCR SERPLBLD CKD-EPI 2021: 20 ML/MIN/1.73M*2
EOSINOPHIL # BLD MANUAL: 0.13 X10*3/UL (ref 0–0.4)
EOSINOPHIL NFR BLD MANUAL: 2.6 %
ERYTHROCYTE [DISTWIDTH] IN BLOOD BY AUTOMATED COUNT: 15.7 % (ref 11.5–14.5)
ERYTHROCYTE [DISTWIDTH] IN BLOOD BY AUTOMATED COUNT: 16.2 % (ref 11.5–14.5)
GLUCOSE SERPL-MCNC: 91 MG/DL (ref 74–99)
HCT VFR BLD AUTO: 24.7 % (ref 41–52)
HCT VFR BLD AUTO: 26 % (ref 41–52)
HGB BLD-MCNC: 8.5 G/DL (ref 13.5–17.5)
HGB BLD-MCNC: 8.6 G/DL (ref 13.5–17.5)
IMM GRANULOCYTES # BLD AUTO: 0.17 X10*3/UL (ref 0–0.5)
IMM GRANULOCYTES NFR BLD AUTO: 3.3 % (ref 0–0.9)
LYMPHOCYTES # BLD MANUAL: 0.18 X10*3/UL (ref 0.8–3)
LYMPHOCYTES NFR BLD MANUAL: 3.5 %
MAGNESIUM SERPL-MCNC: 2.14 MG/DL (ref 1.6–2.4)
MCH RBC QN AUTO: 29.2 PG (ref 26–34)
MCH RBC QN AUTO: 29.2 PG (ref 26–34)
MCHC RBC AUTO-ENTMCNC: 32.7 G/DL (ref 32–36)
MCHC RBC AUTO-ENTMCNC: 34.8 G/DL (ref 32–36)
MCV RBC AUTO: 84 FL (ref 80–100)
MCV RBC AUTO: 89 FL (ref 80–100)
MONOCYTES # BLD MANUAL: 0.05 X10*3/UL (ref 0.05–0.8)
MONOCYTES NFR BLD MANUAL: 0.9 %
NEUTROPHILS # BLD MANUAL: 4.75 X10*3/UL (ref 1.6–5.5)
NEUTS BAND # BLD MANUAL: 0.09 X10*3/UL (ref 0–0.5)
NEUTS BAND NFR BLD MANUAL: 1.7 %
NEUTS SEG # BLD MANUAL: 4.66 X10*3/UL (ref 1.6–5)
NEUTS SEG NFR BLD MANUAL: 91.3 %
NRBC BLD-RTO: 0 /100 WBCS (ref 0–0)
NRBC BLD-RTO: 0 /100 WBCS (ref 0–0)
OVALOCYTES BLD QL SMEAR: ABNORMAL
PHOSPHATE SERPL-MCNC: 4.5 MG/DL (ref 2.5–4.9)
PLATELET # BLD AUTO: 48 X10*3/UL (ref 150–450)
PLATELET # BLD AUTO: 50 X10*3/UL (ref 150–450)
POTASSIUM SERPL-SCNC: 3.8 MMOL/L (ref 3.5–5.3)
PRODUCT BLOOD TYPE: 6200
PRODUCT CODE: NORMAL
PROT SERPL-MCNC: 5.2 G/DL (ref 6.4–8.2)
RBC # BLD AUTO: 2.91 X10*6/UL (ref 4.5–5.9)
RBC # BLD AUTO: 2.95 X10*6/UL (ref 4.5–5.9)
RBC MORPH BLD: ABNORMAL
SODIUM SERPL-SCNC: 142 MMOL/L (ref 136–145)
TOTAL CELLS COUNTED BLD: 115
UNIT ABO: NORMAL
UNIT NUMBER: NORMAL
UNIT RH: NORMAL
UNIT VOLUME: 350
WBC # BLD AUTO: 4.5 X10*3/UL (ref 4.4–11.3)
WBC # BLD AUTO: 5.1 X10*3/UL (ref 4.4–11.3)
XM INTEP: NORMAL

## 2024-05-29 PROCEDURE — 76604 US EXAM CHEST: CPT

## 2024-05-29 PROCEDURE — 76604 US EXAM CHEST: CPT | Performed by: NURSE PRACTITIONER

## 2024-05-29 PROCEDURE — 2500000001 HC RX 250 WO HCPCS SELF ADMINISTERED DRUGS (ALT 637 FOR MEDICARE OP): Performed by: STUDENT IN AN ORGANIZED HEALTH CARE EDUCATION/TRAINING PROGRAM

## 2024-05-29 PROCEDURE — 36415 COLL VENOUS BLD VENIPUNCTURE: CPT

## 2024-05-29 PROCEDURE — 2500000002 HC RX 250 W HCPCS SELF ADMINISTERED DRUGS (ALT 637 FOR MEDICARE OP, ALT 636 FOR OP/ED): Mod: MUE

## 2024-05-29 PROCEDURE — 2500000004 HC RX 250 GENERAL PHARMACY W/ HCPCS (ALT 636 FOR OP/ED): Performed by: STUDENT IN AN ORGANIZED HEALTH CARE EDUCATION/TRAINING PROGRAM

## 2024-05-29 PROCEDURE — 2500000001 HC RX 250 WO HCPCS SELF ADMINISTERED DRUGS (ALT 637 FOR MEDICARE OP)

## 2024-05-29 PROCEDURE — 84100 ASSAY OF PHOSPHORUS: CPT | Performed by: STUDENT IN AN ORGANIZED HEALTH CARE EDUCATION/TRAINING PROGRAM

## 2024-05-29 PROCEDURE — 83735 ASSAY OF MAGNESIUM: CPT | Performed by: STUDENT IN AN ORGANIZED HEALTH CARE EDUCATION/TRAINING PROGRAM

## 2024-05-29 PROCEDURE — 80053 COMPREHEN METABOLIC PANEL: CPT

## 2024-05-29 PROCEDURE — 85007 BL SMEAR W/DIFF WBC COUNT: CPT

## 2024-05-29 PROCEDURE — 1200000003 HC ONCOLOGY  ROOM WITH TELEMETRY DAILY

## 2024-05-29 PROCEDURE — 94660 CPAP INITIATION&MGMT: CPT

## 2024-05-29 PROCEDURE — 99233 SBSQ HOSP IP/OBS HIGH 50: CPT

## 2024-05-29 PROCEDURE — 2500000005 HC RX 250 GENERAL PHARMACY W/O HCPCS: Performed by: STUDENT IN AN ORGANIZED HEALTH CARE EDUCATION/TRAINING PROGRAM

## 2024-05-29 PROCEDURE — C9113 INJ PANTOPRAZOLE SODIUM, VIA: HCPCS | Performed by: STUDENT IN AN ORGANIZED HEALTH CARE EDUCATION/TRAINING PROGRAM

## 2024-05-29 PROCEDURE — 85027 COMPLETE CBC AUTOMATED: CPT

## 2024-05-29 PROCEDURE — 99222 1ST HOSP IP/OBS MODERATE 55: CPT | Performed by: PHYSICAL MEDICINE & REHABILITATION

## 2024-05-29 RX ORDER — METOPROLOL TARTRATE 25 MG/1
12.5 TABLET, FILM COATED ORAL ONCE
Status: COMPLETED | OUTPATIENT
Start: 2024-05-29 | End: 2024-05-29

## 2024-05-29 RX ORDER — METOPROLOL TARTRATE 25 MG/1
25 TABLET, FILM COATED ORAL 2 TIMES DAILY
Status: DISCONTINUED | OUTPATIENT
Start: 2024-05-29 | End: 2024-05-30

## 2024-05-29 RX ADMIN — TAMSULOSIN HYDROCHLORIDE 0.4 MG: 0.4 CAPSULE ORAL at 09:35

## 2024-05-29 RX ADMIN — METOPROLOL TARTRATE 12.5 MG: 25 TABLET, FILM COATED ORAL at 09:35

## 2024-05-29 RX ADMIN — PANTOPRAZOLE SODIUM 40 MG: 40 INJECTION, POWDER, FOR SOLUTION INTRAVENOUS at 09:35

## 2024-05-29 RX ADMIN — METOPROLOL TARTRATE 12.5 MG: 25 TABLET, FILM COATED ORAL at 12:51

## 2024-05-29 RX ADMIN — METOPROLOL TARTRATE 25 MG: 25 TABLET, FILM COATED ORAL at 20:50

## 2024-05-29 RX ADMIN — PANTOPRAZOLE SODIUM 40 MG: 40 INJECTION, POWDER, FOR SOLUTION INTRAVENOUS at 20:50

## 2024-05-29 RX ADMIN — Medication 3 L/MIN: at 08:00

## 2024-05-29 RX ADMIN — ROSUVASTATIN CALCIUM 10 MG: 10 TABLET, FILM COATED ORAL at 20:51

## 2024-05-29 RX ADMIN — NYSTATIN 1 APPLICATION: 100000 POWDER TOPICAL at 09:43

## 2024-05-29 RX ADMIN — NYSTATIN 1 APPLICATION: 100000 POWDER TOPICAL at 21:20

## 2024-05-29 RX ADMIN — OXYCODONE HYDROCHLORIDE AND ACETAMINOPHEN 1000 MG: 500 TABLET ORAL at 09:35

## 2024-05-29 RX ADMIN — Medication 3 L/MIN: at 20:00

## 2024-05-29 ASSESSMENT — COGNITIVE AND FUNCTIONAL STATUS - GENERAL
WALKING IN HOSPITAL ROOM: TOTAL
HELP NEEDED FOR BATHING: A LOT
DRESSING REGULAR LOWER BODY CLOTHING: A LOT
DAILY ACTIVITIY SCORE: 10
TURNING FROM BACK TO SIDE WHILE IN FLAT BAD: A LOT
EATING MEALS: A LITTLE
MOVING FROM LYING ON BACK TO SITTING ON SIDE OF FLAT BED WITH BEDRAILS: A LOT
CLIMB 3 TO 5 STEPS WITH RAILING: TOTAL
DRESSING REGULAR UPPER BODY CLOTHING: TOTAL
TOILETING: TOTAL
STANDING UP FROM CHAIR USING ARMS: A LOT
MOBILITY SCORE: 10
PERSONAL GROOMING: TOTAL
MOVING TO AND FROM BED TO CHAIR: A LOT

## 2024-05-29 ASSESSMENT — ACTIVITIES OF DAILY LIVING (ADL): LACK_OF_TRANSPORTATION: NO

## 2024-05-29 ASSESSMENT — PAIN SCALES - GENERAL: PAINLEVEL_OUTOF10: 0 - NO PAIN

## 2024-05-29 NOTE — SIGNIFICANT EVENT
Patient brought to IR department for evaluation for thoracentesis. Limited chest US demonstrates significant atelectasis and loculated fluid pockets as characterized on CT 5/22/24. Images were captured to demonstrate, a thoracentesis was not performed. Informed patient and managing team of findings.    Please refer to imaging study for further detail.

## 2024-05-29 NOTE — PROGRESS NOTES
"Subjective     Overnight events:  Patient was evaluated at the bedside with his wife present. No acute events overnight. This morning he finished his entire meal. Continues to deny any symptoms at present.         Vital signs:  Blood pressure 119/77, pulse 106, temperature 36.8 °C (98.2 °F), temperature source Temporal, resp. rate 20, height 1.727 m (5' 7.99\"), weight 121 kg (266 lb 4.8 oz), SpO2 96%.    I/O last 3 completed shifts:  In: 2873.3 (23.8 mL/kg) [P.O.:420; I.V.:953.3 (7.9 mL/kg); Blood:500; IV Piggyback:1000]  Out: 1775 (14.7 mL/kg) [Urine:1775 (0.4 mL/kg/hr)]  Weight: 120.8 kg     Physical Exam  Constitutional:       General: He is not in acute distress.  HENT:      Head: Normocephalic and atraumatic.   Eyes:      Extraocular Movements: Extraocular movements intact.   Pulmonary:      Effort: Pulmonary effort is normal.      Breath sounds: No wheezing, rhonchi or rales.      Comments: Decreased breath sounds on left  Abdominal:      General: Bowel sounds are normal. There is distension.      Palpations: Abdomen is soft.      Tenderness: There is no abdominal tenderness.   Musculoskeletal:      Comments: LLE rash resembles petechiae, not warm/painful/edematous when compared to other leg   Neurological:      General: No focal deficit present.      Mental Status: He is oriented to person, place, and time.         Relevant Results        Labs:  Last CBC:  Lab Results   Component Value Date    WBC 5.1 05/29/2024    HGB 8.5 (L) 05/29/2024    HCT 26.0 (L) 05/29/2024    MCV 89 05/29/2024    PLT 50 (L) 05/29/2024       Last RFP:  Lab Results   Component Value Date    GLUCOSE 91 05/29/2024    CALCIUM 8.0 (L) 05/29/2024     05/29/2024    K 3.8 05/29/2024    CO2 24 05/29/2024     05/29/2024    BUN 58 (H) 05/29/2024    CREATININE 2.93 (H) 05/29/2024       Last LFTs:  Lab Results   Component Value Date    ALT 10 05/29/2024    AST 35 05/29/2024    ALKPHOS 62 05/29/2024    BILITOT 0.6 05/29/2024       Last " coags:  Lab Results   Component Value Date    INR 1.1 05/26/2024    APTT 25 (L) 05/23/2024       Micro/culture data:  No results found for the last 90 days.      Imaging:  XR chest 1 view  Narrative: Interpreted By:  Jovany Nuñez,   STUDY:  XR CHEST 1 VIEW;  5/28/2024 8:03 am      INDICATION:  Signs/Symptoms:Rule out infection.      COMPARISON:  Chest radiograph dated 5/27/2024      ACCESSION NUMBER(S):  [id number]      ORDERING CLINICIAN:  ALEXANDRA AVILA      FINDINGS:  AP radiograph of the chest      Pacemaker electrodes appear in adequate position. There is virtually  complete opacification of the left thorax. The heart mediastinum are  shifted to the left indicating volume loss. Compensatory  hyperaeration of the right lung and pulmonary vascular shunting to  the right lung is noted increased from previous study.          Impression: 1. Virtually complete opacification of the left thorax with heart and  mediastinum shifted to the left indicating volume loss  2. Compensatory pulmonary vascular shunting to the right lung              Signed by: Jovany Nuñez 5/28/2024 10:38 AM  Dictation workstation:   [id number]  Electrocardiogram, 12-lead PRN ACS symptoms  Atrial fibrillation with rapid ventricular response  Nonspecific ST and T wave abnormality , probably digitalis effect  Abnormal ECG  When compared with ECG of 28-MAY-2024 01:39, (unconfirmed)  No significant change was found  Confirmed by Jovanny Loera ([id number]) on 5/28/2024 10:17:48 AM         Assessment/Plan   Principal Problem:    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Multi)  Active Problems:    Anemia    Vinicius Arriola is a 88 y.o. male with PMHx of PAD, afib/flutter, hx DVT on warfarin, HTN, CAD s/p stent, mitral regurg, HLD, polycythemia vera, managed by Dr. 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  "Presented to [facility] 5/14 with abdominal pain, fever, diarrhea, urinary retention c/f sepsis presumed 2/2 PNA; CT CAP showed known LLL collapse 2/2 mesothelioma, new post L chest wall invasion, and c/f L pulmonary trunk tumor thrombus.  5/23 developed shock and acute hypoxic resp failure requiring MICU transfer for pressors and NIV (airvo), thought 2/2 GI bleeding with melena and coffee ground drainage from NGT. Quickly weaned off pressors, s/p flex sig with blood in rectum but no active bleed. Now transferred back to floor for further mgmt.      Updates:   - Remains in Afib this morning, Metop tartrate to 25mg  - Dr. Ann will visit inpatient to discuss options further, though it seems they are severely limited given his current condition and previous failed therapies.  - IR consulted regarding L sided Thora tentative for today   - Will likely need O2 at discharge given     #History Afib, HLD, CAD, PAD  #History DVT/PE on Warfarin  :: 5/21 TTE showing:  Left ventricular systolic function is hyperdynamic with a 70-75% estimated ejection fraction, Mildly elevated RVSP, Moderately dilated aortic root   Continues to have episodes of Afib w/ RVR likely multifactorial  - Metop tartrate at 25mg  - Holding warfarin, aspirin  - Continue home statin    #Worsening mesothelioma  #Tumor Thrombus  #Mesothelioma with Loculated Pleural Effusions   #Left Hilar Mass, Left Pulmonary Trunk Mass  #History JOVANNI, Pulmonary Embolism  :: primary oncologist Dr. Galvan  :: s/p L VATS w/ decort 5/2022, XRT 9-10/22  :: s/p \"half-dose\" pemfexy on 5/14  :: CT chest 5/18 with significant collapse of left lung, loculated pleural effusions consistent with patient's known mesothelioma.  Also shows ill-defined patchy infiltrate in right lower lobe.  Shows left hilar mass with extension into left pulmonary trunk likely tumor thrombus   - Holding warfarin, continue to monitor   - Dr. Henderson recommends transitioning to Eliquis on discharge, deferring " anticoagulation iso thrombocytopenia. Plt at 50 5/29; once stable above 50 will plan to start Eliquis 5mg BID (goal plt above 50)  - Continuing protocol w/ 1 mg folic acid daily    #Tachypnea   #Elevated A-a gradient   #AHRF (Improved)  :: multifactorial, like d/t shunt secondary to malignancy vs deconditioning given prolonged admission vs intermittent Afib vs v/q mismatch   :: RR 23-44   :: 5/22 ABG: pH 7.53, pCO2 31, pO2 109, Lactate 2.2, FiO2 40.  - On 4L nasal cannula previously requiring intermittent AIRVO to assist with work of breathing.   - gradually wean supplemental O2 for SpO2 goal >90%   - continue abx regimen  - CTM     #Coffee grounds via NGT (placed 5/21), improved   #Melena likely d/t GI bleed  :: KUB 5/21, showing moderate distention of stomach with nonspecific predominantly fluid-filled bowel pattern.  :: 5/20-5/21 patient emesis that is watery, dark brown, malodorous   :: did initially present w/ diarrhea could have been chemo induced vs stool ball  :: INR 4.9>6.3>2.4 >1.2  :: s/p Vitamin K 10 mg  :: KUB w/ dilated bowel loops   - Soft diet, encouraged patient to go slow  - holding ferrous sulfate    - pantoprazole 40 bid   - GI consulted, appreciate recs  -s/p flex sig with no evidence of active bleed              -Will hold off EGD since coffee ground emesis resolved     #Illeus (improved/resolved)  :: KUB 5/26 kub shows distended bowel loops likely ileus, will monitor for improvement with PO diet.   - NG tube clamped; If becoming nauseous or developing vomiting, can unclamp NGT and place to LIWS.  - Tolerating PO and having BM. Will CTM     #Pneumonia, Right lower lobe ill-defined patchy infiltrate (resolved)  :: Resp Cult salivary contamination  :: MRSA nares: negative  :: Strep/Legionella antigens: negative  :: CXR 5/21 showing persistent patchy right infrahilar airspace opacities; improved on serial CXRs  :: 5/22 CT CAP w/o contrast: Interval improvement of the right lower lobe patchy  infiltrate  :: s/p Azithro (5/16-5/18)  :: s/p vanc (5/16-5/18) (5/20-5/22) and zosyn (5/16-5/18) (5/20-22)  -eropenem (5/19-5/20) (5/22-25) and micafungin (5/22-25)  -Abx stopped 5/25 given prolonged course with improvement/decline independent of abx, do not suspect active infection currently.       #Fever  #Sepsis most likely d/t pneumonia  :: at OSH met SIRS criteria (febrile, tachypnea)  :: UA 5/18 with small LE, 3+ bacteria, urine cx 5/21 negative  :: Resp Cult salivary contamination  :: MRSA nares: negative  :: Strep/Legionella antigens: negative  :: c diff and enteric stool panel: negative  :: BC 5/16 NGTD, repeat BC 5/20 NGTD  :: CRP 30.46>42.36  :: CXR 5/21 showing persistent patchy right infrahilar airspace opacities    :: s/p Azithro (5/16-5/18)  :: asymmetric warmth, erythema, edema noted in left lower extremity   :: s/p vanc (5/16-5/18) (5/20-5/25) and zosyn (5/16-5/18) (5/20-22)  :: 5/22 CT CAP w/o contrast: Interval improvement of the right lower lobe patchy infiltrate, Interval flattening of the inferior vena cava which may be seen with hypovolemia, Improved proximal sigmoid diverticulitis.  - repeat blood cultures drawn 5/22, NGTD   - meropenem (5/19-5/20) (5/22-5/25) and micafungin (5/22-5/25); restarted Vanc/Sofy briefly overnight 5/27.     #Lower leg skin changes likely d/t cellulitis (improving)  - improved margins of left lower extremity skin changes, will ctm      #Anemia, improved  :: Likely 2/2 GIB noted this admission  :: Melena and coffee grounds via NGT noted 5/22  :: Hgb initially 10, decreased to 6.9 on 5/23, now stable since 5/24  - S/p 1u pRBC 5/23  - Continue to trend Hgb BID (goal > 8), platelets goal > 20. Platelet goal liberalized as no active bleeding, if hgb decreasing or bleeding clinically then transfuse to goal 50        #VIK on CKD, likely prerenal d/t fluid status   # CKD (baseline Cr 1.3-1.4)  # urine retention s/p Forrest Catheter Placement by Urology in OSH  Estimated  Creatinine Clearance: 22.9 mL/min (A) (by C-G formula based on SCr of 2.93 mg/dL (H)).  :: US renal 5/16: Nonobstructing 1 cm left renal calculus, no hydronephrosis  :: Cr 1.64>2.26>3.00 (Baseline Cr 1.3-1.4)  :: Urine electrolytes pre-renal, Net negative, hypernatremia with prolonged NPO all suggest pre-renal hypovolemic VIK. Will encourage PO intake  - will eventually need boyd removal and trial  - strict in/out  - tamsulosin 0.4 mg daily  -minimize nephrotoxic medications as able  - boyd removed 5/23/24 (5/16-5/23)     #Pancytopenia, most likely medication induced from pemfexy, resolved  #Thrombocytopenia  :: c/f TTP  ::   :: s/p pemfexy 5/14  :: d dimer 1521, fibrinogen >1000- no c/f dic  :: no schistocytes on smear  ::   :: filgrastim 480 mcg (5/21-5/22)  - 5/23 ANC 4130   - haptoglobin 400   - Platelets 55>38, ctm. Goal >10  - s/p 1u platelets 5/23 (was bleeding at the time)     #Elevated INR  :: INR 4.9>6.3>2.4 >1.2  :: s/p Vitamin K 10 mg  -CTM    # Hospital-acquired Delirium  :: waxing and waning altered mental status  -Delirium precautions  -hold home trazodone for insomnia      Antibiotics:: none  Dispo: SNF.      F: Replete PRN  E: Replete PRN  N: Regular Diet  DVT Ppx: None iso of low Plt  GI Ppx: Pantoprazole   Access/Lines: PIV x3  Boyd External (5/23- )  Abx: None  O2: 4L NC    Pain regimen: Tylenol   GI Laxative: None     Code status: DNR and No Intubation  Emergency contact:Kirsten Arriola (Spouse) [phone]        Ramesh Conley MD  PGY-1 Internal Medicine

## 2024-05-29 NOTE — CARE PLAN
Problem: Fall/Injury  Goal: Verbalize understanding of personal risk factors for fall in the hospital  Outcome: Progressing  Goal: Verbalize understanding of risk factor reduction measures to prevent injury from fall in the home  Outcome: Progressing  Goal: Use assistive devices by end of the shift  Outcome: Progressing  Goal: Pace activities to prevent fatigue by end of the shift  Outcome: Progressing     Problem: Skin  Goal: Promote/optimize nutrition  5/29/2024 1304 by Silviano Anderson RN  Flowsheets (Taken 5/29/2024 1304)  Promote/optimize nutrition: Monitor/record intake including meals  5/29/2024 1304 by Silviano Anderson RN  Outcome: Progressing  Flowsheets (Taken 5/29/2024 1304)  Promote/optimize nutrition: Monitor/record intake including meals  Goal: Promote skin healing  5/29/2024 1304 by Silviano Anderson RN  Flowsheets (Taken 5/29/2024 1304)  Promote skin healing: Turn/reposition every 2 hours/use positioning/transfer devices  5/29/2024 1304 by Silviano Anderson RN  Outcome: Progressing  Flowsheets (Taken 5/29/2024 1304)  Promote skin healing: Turn/reposition every 2 hours/use positioning/transfer devices

## 2024-05-29 NOTE — CONSULTS
PM&R Consult Note  Patient: Vinicius Arriola   Age/sex: 88 y.o.   Medical Record #: [id number]       Referring physician: Grayson Nichols, APR*    Consulting physician: Jamie Saunders M.D.    Procedures performed on/related to this admission:  tbd    Chief complaint:   Impairments and acitivities limitations in ADLs, mobility, functional communication, and cognition secondary to mesothelioma, LLL collapse and pleural effusion complicated by GI bleeding/anemia    HPI:   Vinicius Arriola is a 88 y.o. male with PMHx of PAD, afib/flutter, hx DVT on warfarin, HTN, CAD s/p stent, mitral regurg, HLD, polycythemia vera, managed by Dr. 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 Presented to [facility] 5/14 with abdominal pain, fever, diarrhea, urinary retention c/f sepsis presumed 2/2 PNA; CT CAP showed known LLL collapse 2/2 mesothelioma, new post L chest wall invasion, and c/f L pulmonary trunk tumor thrombus.  5/23 developed shock and acute hypoxic resp failure requiring MICU transfer for pressors and NIV (airvo), thought 2/2 GI bleeding with melena and coffee ground drainage from NGT. Quickly weaned off pressors, s/p flex sig with blood in rectum but no active bleed. Now transferred back to floor for further mgmt.     Also known cellulitis  Previous history of foot drop greater than 10 years, he attributed to previous blood clots?     PM&R was consulted for recommendations and for IRF appropriateness.    Present Status: Stable   PO: reg  Pain: minimal  Bowel: cont  Bladder: cont/ext  DVT prophylaxis with none now - plan for Eliquis.   Weight bearing status: Full    Precautions: na    Past Medical History:   Diagnosis Date   • Acute embolism and thrombosis of unspecified deep veins of unspecified lower extremity (Multi)     Acute embolism and  thrombosis of unspecified deep veins of unspecified lower extremity   • Dental disease     Upper and lower dentures   • Encounter for preprocedural cardiovascular examination 11/02/2021    Pre-operative cardiovascular examination   • Obesity, unspecified 07/15/2022    Class 2 obesity with body mass index (BMI) of 38.0 to 38.9 in adult   • Personal history of diseases of the blood and blood-forming organs and certain disorders involving the immune mechanism     History of polycythemia   • Personal history of other diseases of male genital organs     History of benign prostatic hyperplasia   • Personal history of other diseases of the circulatory system     History of hypertension   • Personal history of other diseases of the circulatory system     History of cardiac arrhythmia   • Personal history of other diseases of the circulatory system 10/21/2021    History of abnormal electrocardiography   • Personal history of other diseases of the circulatory system     History of essential hypertension   • Personal history of other diseases of the nervous system and sense organs     History of sleep apnea   • Personal history of other malignant neoplasm of skin     History of malignant neoplasm of skin   • Personal history of other specified conditions     History of balance disorder   • Personal history of other venous thrombosis and embolism     History of deep venous thrombosis        Past Surgical History:   Procedure Laterality Date   • OTHER SURGICAL HISTORY  08/20/2019    Hernia repair   • OTHER SURGICAL HISTORY  08/20/2019    Tonsillectomy with adenoidectomy   • OTHER SURGICAL HISTORY  08/20/2019    Cataract surgery   • OTHER SURGICAL HISTORY  06/10/2022    Pulmonary decortication   • OTHER SURGICAL HISTORY  11/02/2021    Knee replacement   • OTHER SURGICAL HISTORY  01/02/2020    Cardioversion   • OTHER SURGICAL HISTORY  01/02/2020    Finger surgical procedure   • OTHER SURGICAL HISTORY  05/13/2022    Cardiac  catheterization with stent placement   • OTHER SURGICAL HISTORY  05/26/2022    Pulmonary decortication   • OTHER SURGICAL HISTORY  10/21/2021    Back surgery   • OTHER SURGICAL HISTORY  11/02/2021    Colonoscopy   • OTHER SURGICAL HISTORY  11/02/2021    Inguinal hernia repair   • OTHER SURGICAL HISTORY  11/02/2021    Trigger finger repair   • OTHER SURGICAL HISTORY  11/02/2021    Umbilical hernia repair   • OTHER SURGICAL HISTORY  11/04/2021    Carpal tunnel surgery        Family History   Problem Relation Name Age of Onset   • Accidental death Mother     • Coronary artery disease Mother     • Other (Cardiac disorder) Father     • Dementia Father     • Cancer Father     • Colon cancer Daughter          Allergies   Allergen Reactions   • Benadryl Allergy Decongestant Anxiety and Insomnia   • Diphenhydramine Anxiety     anxious   • Diphenhydramine Hcl Anxiety and Insomnia        ascorbic acid, 1,000 mg, oral, Daily  [Held by provider] aspirin, 81 mg, oral, Nightly  ergocalciferol, 1,250 mcg, oral, Every Sunday  metoprolol tartrate, 25 mg, oral, BID  nystatin, 1 Application, Topical, BID  oxygen, , inhalation, Continuous - Inhalation  pantoprazole, 40 mg, intravenous, BID  perflutren protein A microsphere, 0.5 mL, intravenous, Once in imaging  rosuvastatin, 10 mg, oral, Nightly  sulfur hexafluoride microsphr, 2 mL, intravenous, Once in imaging  tamsulosin, 0.4 mg, oral, Daily  [Held by provider] traZODone, 100 mg, oral, Nightly         PRN medications: acetaminophen, albuterol, carboxymethylcellulose, diclofenac sodium, melatonin, nitroglycerin, ondansetron **OR** ondansetron, oxygen     Social History:  Tobacco/EtOh/Illicits:none  Working History:retired  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  Mental Status: Pleasant, direct, appropriate mood and affect  Resp: breathing is unlabored without audible wheeze  Vascular: Normal pedal and radial pulses, no cyanosis, no venous stasis changes  Lymph: No LAD, no lymphedema  Skin: No overlying skin change, ecchymosis, positive right lower extremity mild erythema and 2+ edema right 1+ left to mid shin  NEURO - 1/5 left EHL and ankle dorsiflexion eversion inversion, otherwise 5 out of 5 upper and lower extremities with intact sensation normal tone    Lab Results   Component Value Date    WBC 5.1 05/29/2024    HGB 8.5 (L) 05/29/2024    HCT 26.0 (L) 05/29/2024    MCV 89 05/29/2024    PLT 50 (L) 05/29/2024        Lab Results   Component Value Date    CREATININE 2.93 (H) 05/29/2024    BUN 58 (H) 05/29/2024     05/29/2024    K 3.8 05/29/2024     05/29/2024    CO2 24 05/29/2024        IMPRESSION:  Vinicius Arriola is a 88 y.o. year old male patient with debility and complicated acute medical illness with GI bleeding, now off of warfarin and switching to Eliquis.  Also persistent left sided pleural effusion new oxygen demand and left lower lobe collapse from mesothelioma.      PM&R was consulted for recommendations and for IRF appropriateness.    Recommendations:  #  bowel/bladder  - Voiding volitionally   -As melena resolves will need daily bowel program -monitor closely, if stools slow start  Miralax BID and Docusate 100mg BID  - Goal of one BM daily, at risk for constipation while on opioids for pain management    # Immobility   - Prevent secondary complications   - Skin protection: low air loss mattress, turn q 2  hours in bed, maintain bowel and bladder continence/containment  - Prevention of pulmonary complications: incentive spirometry and pulmonary toileting  - Maintain adequate nutrition and hydration  - Q shift PROM of upper and lower extremities to prevent contracture    # Impaired mobility and Impaired independence with ADLs and I/ADLs  - Continue PT, working on bed mobility, transfers, balance, endurance, strength, gait, eval for appropriate assistive device  - Continue OT, working on functional cognition, functional mobility, upper limb strength/coordination, balance, endurance, eval for appropriate adaptive equipment, ADLs, and I/ADLs.    # Dispo  - Patient is early on in acute hospital course. Will need rehabilitation upon discharge, but at this time we are unable to formally recommend the level of rehab that is most appropriate, current therapy performance indicates skilled nursing facility, but patient had good experience with last admission to  acute rehabilitation and has adequate home set up and support to eventually discharge back home.  I suspect his function was significantly improved after thoracentesis and as blood counts stabilized.. We will continue to follow and adjust recommendations as her clinical condition changes.     We will follow along with you.  Thank you for the consult.  Dr HUE Alva  will be covering on Friday and Dr Mendel Mc on Monday, I will ask them to check on patient then.  Dr. Mc may remember him from previous admission at [facility]    Jamie Saunders M.D, FAAPMR, R-MSK  Chief, Division of PM&R  Board Certified in PM&R, Sports Medicine and Musculoskeletal Ultrasound  Available via [employer]

## 2024-05-29 NOTE — PROGRESS NOTES
05/29/24 1400   Discharge Planning   Living Arrangements Spouse/significant other   Support Systems Spouse/significant other;Children   Type of Residence Private residence   Number of Stairs to Enter Residence 3   Do you have animals or pets at home? No   Who is requesting discharge planning? Provider   Home or Post Acute Services Post acute facilities (Rehab/SNF/etc)   Type of Post Acute Facility Services Rehab   Does the patient need discharge transport arranged? Yes   RoundTrip coordination needed? Yes   Financial Resource Strain   How hard is it for you to pay for the very basics like food, housing, medical care, and heating? Not very   Housing Stability   In the last 12 months, was there a time when you were not able to pay the mortgage or rent on time? N   In the last 12 months, how many places have you lived? 1   In the last 12 months, was there a time when you did not have a steady place to sleep or slept in a shelter (including now)? N   Transportation Needs   In the past 12 months, has lack of transportation kept you from medical appointments or from getting medications? no   In the past 12 months, has lack of transportation kept you from meetings, work, or from getting things needed for daily living? No     SW requested care team order PM&R consult for rehab placement.  SW updated pt and spouse.  Will follow.  MICHELLE Vera

## 2024-05-29 NOTE — DOCUMENTATION CLARIFICATION NOTE
"    PATIENT:               ZAKIYA LAWSON  ACCT #:                  [id number]  MRN:                       [id number]  :                       1935  ADMIT DATE:       2024 8:14 PM  DISCH DATE:        2024 5:08 PM  RESPONDING PROVIDER #:        [id number]          PROVIDER RESPONSE TEXT:    Sepsis with respiratory organ dysfunction with acute respiratory failure    CDI QUERY TEXT:    Clarification        Instruction:  Based on your assessment of the patient and the clinical information, please provide the requested documentation by clicking on the appropriate radio button and enter any additional information if prompted.    Question: Sepsis was documented in the medical record. Based on the documentation and the clinical information, can the diagnosis be further clarified as    When answering this query, please exercise your independent professional judgment. The fact that a question is being asked, does not imply that any particular answer is desired or expected.    The patient's clinical indicators include:  Clinical Information: 88 yr old male presented to ED  with weakness following recent chemo treatment for mesothelioma. Admitted with PNA. Tx to [facility]  .    Documented Diagnosis: Discharge Summary  per NAJMA Wilkinson CNP states: \"Sepsis\"    ED Note  per Dr. Oh states: \"Patient developed a fever while he was here so I did give him broad-spectrum antibiotics and shortly after he got antibiotics his blood pressure seem to worsen his heart rate seem to increase pointing towards likely occult sepsis so I did treat him with 2 L of lactated Ringer's.    Progress Note  per NAJMA Wilkinson states: \"#Sepsis   \"Acute Hypoxic Respiratory Insufficiency\"  Likely secondary to pneumonia vs UTI  -- CT Chest with RLL infiltrate  -- UA  with small LE, 3+ bacteria  -- MRSA nares: negative  -- Strep/Legionella antigens: negative  -- Blood cultures : NGTD  -- Urine culture : pending    H/P  " "per LIZ Garber CNP states: \"Sepsis alert-hypotension tachycardia, organ dysfunction noted-elevated creatine \"    ID Consultation Note 5/18 per Dr. Geiger: \"Right lower lobe pneumonia   Diverticulitis\"      Clinical Indicators:  -Vital Signs, initial in ED: 36.6  99  20  142/70  95 percent SpO2 on RA.  Subsequent T 37.9<29.8.  -WBC: 11.2  -Microbiology Results: Urine culture 5/18: No growth.  -Absolute Neutrophil Count: 10.20  -Lactic acid: 1.5/2.0  -BUN/Creat: 29/1.34  -Blood cultures: No growth x 4 days  -Bilirubin: 0.5  -[place] Coma Scale:  -PAO2/FIO2: Started on supplemental O2 2L on 5/17 for SpO2 89 percent.  On 5/18 1202  SpO2 94 percent on 3L for P/F ratio: 228.   Subsequent entries with supplemental O2 90-95 percent SpO2.  -Procalcitonin: 0.33  -Platelets: 197  -CRP: 30.46  -Sed rate: 49    Treatment: IVF, IV ABX, blood culture x2, urine culture, MRSA nares, strep/ legionella antigen,    Risk Factors: Pneumonia, acute respiratory insufficiency, diverticulitis, immunocompromised.  Options provided:  -- Sepsis was a differential diagnosis and ruled out after study  -- Sepsis with respiratory organ dysfunction with acute respiratory failure  -- Sepsis with other organ dysfunction, Please specify sepsis associated organ dysfunction below  -- Other - I will add my own diagnosis  -- Refer to Clinical Documentation Reviewer    Query created by: Sarai Young on 5/23/2024 4:48 PM      Electronically signed by:  DNENY LEONE-CNP 5/29/2024 7:21 PM          "

## 2024-05-30 ENCOUNTER — APPOINTMENT (OUTPATIENT)
Dept: CARDIOLOGY | Facility: HOSPITAL | Age: 89
End: 2024-05-30
Payer: MEDICARE

## 2024-05-30 ENCOUNTER — APPOINTMENT (OUTPATIENT)
Dept: RADIOLOGY | Facility: HOSPITAL | Age: 89
End: 2024-05-30
Payer: MEDICARE

## 2024-05-30 LAB
ABO GROUP (TYPE) IN BLOOD: NORMAL
ALBUMIN SERPL BCP-MCNC: 2.4 G/DL (ref 3.4–5)
ALP SERPL-CCNC: 62 U/L (ref 33–136)
ALT SERPL W P-5'-P-CCNC: 9 U/L (ref 10–52)
ANION GAP SERPL CALC-SCNC: 14 MMOL/L (ref 10–20)
ANTIBODY SCREEN: NORMAL
APPEARANCE UR: ABNORMAL
APTT PPP: 28 SECONDS (ref 27–38)
AST SERPL W P-5'-P-CCNC: 33 U/L (ref 9–39)
ATRIAL RATE: 293 BPM
BASOPHILS # BLD AUTO: 0.01 X10*3/UL (ref 0–0.1)
BASOPHILS NFR BLD AUTO: 0.2 %
BILIRUB SERPL-MCNC: 0.5 MG/DL (ref 0–1.2)
BILIRUB UR STRIP.AUTO-MCNC: NEGATIVE MG/DL
BUN SERPL-MCNC: 51 MG/DL (ref 6–23)
BURR CELLS BLD QL SMEAR: NORMAL
CALCIUM SERPL-MCNC: 7.7 MG/DL (ref 8.6–10.6)
CHLORIDE SERPL-SCNC: 106 MMOL/L (ref 98–107)
CO2 SERPL-SCNC: 23 MMOL/L (ref 21–32)
COLOR UR: ABNORMAL
CREAT SERPL-MCNC: 3.06 MG/DL (ref 0.5–1.3)
EGFRCR SERPLBLD CKD-EPI 2021: 19 ML/MIN/1.73M*2
EOSINOPHIL # BLD AUTO: 0.13 X10*3/UL (ref 0–0.4)
EOSINOPHIL NFR BLD AUTO: 2.5 %
ERYTHROCYTE [DISTWIDTH] IN BLOOD BY AUTOMATED COUNT: 16.3 % (ref 11.5–14.5)
GLUCOSE SERPL-MCNC: 83 MG/DL (ref 74–99)
GLUCOSE UR STRIP.AUTO-MCNC: NORMAL MG/DL
HCT VFR BLD AUTO: 24.4 % (ref 41–52)
HGB BLD-MCNC: 7.8 G/DL (ref 13.5–17.5)
HOLD SPECIMEN: NORMAL
IMM GRANULOCYTES # BLD AUTO: 0.08 X10*3/UL (ref 0–0.5)
IMM GRANULOCYTES NFR BLD AUTO: 1.6 % (ref 0–0.9)
KETONES UR STRIP.AUTO-MCNC: NEGATIVE MG/DL
LEUKOCYTE ESTERASE UR QL STRIP.AUTO: NEGATIVE
LYMPHOCYTES # BLD AUTO: 0.21 X10*3/UL (ref 0.8–3)
LYMPHOCYTES NFR BLD AUTO: 4.1 %
MAGNESIUM SERPL-MCNC: 2.14 MG/DL (ref 1.6–2.4)
MCH RBC QN AUTO: 28.8 PG (ref 26–34)
MCHC RBC AUTO-ENTMCNC: 32 G/DL (ref 32–36)
MCV RBC AUTO: 90 FL (ref 80–100)
MONOCYTES # BLD AUTO: 0.13 X10*3/UL (ref 0.05–0.8)
MONOCYTES NFR BLD AUTO: 2.5 %
NEUTROPHILS # BLD AUTO: 4.58 X10*3/UL (ref 1.6–5.5)
NEUTROPHILS NFR BLD AUTO: 89.1 %
NITRITE UR QL STRIP.AUTO: NEGATIVE
NRBC BLD-RTO: 0 /100 WBCS (ref 0–0)
OVALOCYTES BLD QL SMEAR: NORMAL
PH UR STRIP.AUTO: 6 [PH]
PHOSPHATE SERPL-MCNC: 4.1 MG/DL (ref 2.5–4.9)
PLATELET # BLD AUTO: 59 X10*3/UL (ref 150–450)
POTASSIUM SERPL-SCNC: 4 MMOL/L (ref 3.5–5.3)
PROT SERPL-MCNC: 5 G/DL (ref 6.4–8.2)
PROT UR STRIP.AUTO-MCNC: ABNORMAL MG/DL
Q ONSET: 224 MS
QRS COUNT: 23 BEATS
QRS DURATION: 92 MS
QT INTERVAL: 292 MS
QTC CALCULATION(BAZETT): 444 MS
QTC FREDERICIA: 386 MS
R AXIS: 92 DEGREES
RBC # BLD AUTO: 2.71 X10*6/UL (ref 4.5–5.9)
RBC # UR STRIP.AUTO: ABNORMAL /UL
RBC #/AREA URNS AUTO: NORMAL /HPF
RBC MORPH BLD: NORMAL
RH FACTOR (ANTIGEN D): NORMAL
SODIUM SERPL-SCNC: 139 MMOL/L (ref 136–145)
SP GR UR STRIP.AUTO: 1.01
T AXIS: -57 DEGREES
T OFFSET: 370 MS
UFH PPP CHRO-ACNC: 0.2 IU/ML
UFH PPP CHRO-ACNC: 0.4 IU/ML
UROBILINOGEN UR STRIP.AUTO-MCNC: NORMAL MG/DL
VENTRICULAR RATE: 139 BPM
WBC # BLD AUTO: 5.1 X10*3/UL (ref 4.4–11.3)
WBC #/AREA URNS AUTO: NORMAL /HPF

## 2024-05-30 PROCEDURE — 1200000003 HC ONCOLOGY  ROOM WITH TELEMETRY DAILY

## 2024-05-30 PROCEDURE — 2500000001 HC RX 250 WO HCPCS SELF ADMINISTERED DRUGS (ALT 637 FOR MEDICARE OP)

## 2024-05-30 PROCEDURE — 86923 COMPATIBILITY TEST ELECTRIC: CPT

## 2024-05-30 PROCEDURE — 80053 COMPREHEN METABOLIC PANEL: CPT

## 2024-05-30 PROCEDURE — 2500000004 HC RX 250 GENERAL PHARMACY W/ HCPCS (ALT 636 FOR OP/ED): Performed by: STUDENT IN AN ORGANIZED HEALTH CARE EDUCATION/TRAINING PROGRAM

## 2024-05-30 PROCEDURE — 85730 THROMBOPLASTIN TIME PARTIAL: CPT

## 2024-05-30 PROCEDURE — 2500000002 HC RX 250 W HCPCS SELF ADMINISTERED DRUGS (ALT 637 FOR MEDICARE OP, ALT 636 FOR OP/ED): Mod: MUE

## 2024-05-30 PROCEDURE — 93971 EXTREMITY STUDY: CPT

## 2024-05-30 PROCEDURE — 2500000002 HC RX 250 W HCPCS SELF ADMINISTERED DRUGS (ALT 637 FOR MEDICARE OP, ALT 636 FOR OP/ED)

## 2024-05-30 PROCEDURE — 85025 COMPLETE CBC W/AUTO DIFF WBC: CPT

## 2024-05-30 PROCEDURE — 93010 ELECTROCARDIOGRAM REPORT: CPT | Performed by: INTERNAL MEDICINE

## 2024-05-30 PROCEDURE — 93005 ELECTROCARDIOGRAM TRACING: CPT

## 2024-05-30 PROCEDURE — 36415 COLL VENOUS BLD VENIPUNCTURE: CPT

## 2024-05-30 PROCEDURE — 99233 SBSQ HOSP IP/OBS HIGH 50: CPT

## 2024-05-30 PROCEDURE — 83735 ASSAY OF MAGNESIUM: CPT | Performed by: STUDENT IN AN ORGANIZED HEALTH CARE EDUCATION/TRAINING PROGRAM

## 2024-05-30 PROCEDURE — 93971 EXTREMITY STUDY: CPT | Performed by: RADIOLOGY

## 2024-05-30 PROCEDURE — 2500000004 HC RX 250 GENERAL PHARMACY W/ HCPCS (ALT 636 FOR OP/ED)

## 2024-05-30 PROCEDURE — 86900 BLOOD TYPING SEROLOGIC ABO: CPT | Mod: 91

## 2024-05-30 PROCEDURE — 94660 CPAP INITIATION&MGMT: CPT

## 2024-05-30 PROCEDURE — 2500000001 HC RX 250 WO HCPCS SELF ADMINISTERED DRUGS (ALT 637 FOR MEDICARE OP): Performed by: STUDENT IN AN ORGANIZED HEALTH CARE EDUCATION/TRAINING PROGRAM

## 2024-05-30 PROCEDURE — 84100 ASSAY OF PHOSPHORUS: CPT | Performed by: STUDENT IN AN ORGANIZED HEALTH CARE EDUCATION/TRAINING PROGRAM

## 2024-05-30 PROCEDURE — 85520 HEPARIN ASSAY: CPT | Mod: 91

## 2024-05-30 PROCEDURE — C9113 INJ PANTOPRAZOLE SODIUM, VIA: HCPCS | Performed by: STUDENT IN AN ORGANIZED HEALTH CARE EDUCATION/TRAINING PROGRAM

## 2024-05-30 PROCEDURE — 2500000005 HC RX 250 GENERAL PHARMACY W/O HCPCS: Performed by: STUDENT IN AN ORGANIZED HEALTH CARE EDUCATION/TRAINING PROGRAM

## 2024-05-30 PROCEDURE — 81001 URINALYSIS AUTO W/SCOPE: CPT

## 2024-05-30 RX ORDER — CEPHALEXIN 500 MG/1
500 CAPSULE ORAL EVERY 8 HOURS SCHEDULED
Status: COMPLETED | OUTPATIENT
Start: 2024-05-30 | End: 2024-06-04

## 2024-05-30 RX ORDER — METOPROLOL TARTRATE 25 MG/1
37.5 TABLET, FILM COATED ORAL 2 TIMES DAILY
Status: DISCONTINUED | OUTPATIENT
Start: 2024-05-30 | End: 2024-05-31

## 2024-05-30 RX ORDER — DEXTROMETHORPHAN POLISTIREX 30 MG/5ML
30 SUSPENSION ORAL EVERY 12 HOURS SCHEDULED
Status: DISCONTINUED | OUTPATIENT
Start: 2024-05-30 | End: 2024-06-11 | Stop reason: HOSPADM

## 2024-05-30 RX ORDER — HEPARIN SODIUM 10000 [USP'U]/100ML
0-4000 INJECTION, SOLUTION INTRAVENOUS CONTINUOUS
Status: DISCONTINUED | OUTPATIENT
Start: 2024-05-30 | End: 2024-06-04

## 2024-05-30 RX ORDER — ACETAMINOPHEN 160 MG/5ML
650 SOLUTION ORAL EVERY 4 HOURS PRN
Status: DISCONTINUED | OUTPATIENT
Start: 2024-05-30 | End: 2024-06-11 | Stop reason: HOSPADM

## 2024-05-30 RX ORDER — ACETAMINOPHEN 325 MG/1
650 TABLET ORAL EVERY 4 HOURS PRN
Status: DISCONTINUED | OUTPATIENT
Start: 2024-05-30 | End: 2024-06-11 | Stop reason: HOSPADM

## 2024-05-30 RX ADMIN — CEPHALEXIN 500 MG: 500 CAPSULE ORAL at 21:57

## 2024-05-30 RX ADMIN — CEPHALEXIN 500 MG: 500 CAPSULE ORAL at 14:02

## 2024-05-30 RX ADMIN — PANTOPRAZOLE SODIUM 40 MG: 40 INJECTION, POWDER, FOR SOLUTION INTRAVENOUS at 08:55

## 2024-05-30 RX ADMIN — Medication 4 L/MIN: at 20:00

## 2024-05-30 RX ADMIN — ROSUVASTATIN CALCIUM 10 MG: 10 TABLET, FILM COATED ORAL at 21:57

## 2024-05-30 RX ADMIN — TAMSULOSIN HYDROCHLORIDE 0.4 MG: 0.4 CAPSULE ORAL at 08:55

## 2024-05-30 RX ADMIN — METOPROLOL TARTRATE 25 MG: 25 TABLET, FILM COATED ORAL at 09:10

## 2024-05-30 RX ADMIN — HEPARIN SODIUM 1452 UNITS/HR: 10000 INJECTION, SOLUTION INTRAVENOUS at 12:24

## 2024-05-30 RX ADMIN — HEPARIN SODIUM 1652 UNITS/HR: 10000 INJECTION, SOLUTION INTRAVENOUS at 17:58

## 2024-05-30 RX ADMIN — NYSTATIN 1 APPLICATION: 100000 POWDER TOPICAL at 08:55

## 2024-05-30 RX ADMIN — METOPROLOL TARTRATE 37.5 MG: 25 TABLET, FILM COATED ORAL at 21:57

## 2024-05-30 RX ADMIN — SODIUM CHLORIDE, POTASSIUM CHLORIDE, SODIUM LACTATE AND CALCIUM CHLORIDE 500 ML: 600; 310; 30; 20 INJECTION, SOLUTION INTRAVENOUS at 08:55

## 2024-05-30 RX ADMIN — Medication 3 L/MIN: at 08:00

## 2024-05-30 RX ADMIN — PANTOPRAZOLE SODIUM 40 MG: 40 INJECTION, POWDER, FOR SOLUTION INTRAVENOUS at 21:57

## 2024-05-30 RX ADMIN — DEXTROMETHORPHAN 30 MG: 30 SUSPENSION, EXTENDED RELEASE ORAL at 21:57

## 2024-05-30 RX ADMIN — OXYCODONE HYDROCHLORIDE AND ACETAMINOPHEN 1000 MG: 500 TABLET ORAL at 08:55

## 2024-05-30 ASSESSMENT — COGNITIVE AND FUNCTIONAL STATUS - GENERAL
PERSONAL GROOMING: A LOT
EATING MEALS: A LITTLE
CLIMB 3 TO 5 STEPS WITH RAILING: A LOT
DRESSING REGULAR LOWER BODY CLOTHING: TOTAL
MOBILITY SCORE: 12
MOVING FROM LYING ON BACK TO SITTING ON SIDE OF FLAT BED WITH BEDRAILS: A LOT
WALKING IN HOSPITAL ROOM: A LOT
TURNING FROM BACK TO SIDE WHILE IN FLAT BAD: A LOT
TOILETING: A LOT
DRESSING REGULAR UPPER BODY CLOTHING: A LOT
MOVING TO AND FROM BED TO CHAIR: A LOT
STANDING UP FROM CHAIR USING ARMS: A LOT
DAILY ACTIVITIY SCORE: 12
HELP NEEDED FOR BATHING: A LOT

## 2024-05-30 ASSESSMENT — PAIN - FUNCTIONAL ASSESSMENT: PAIN_FUNCTIONAL_ASSESSMENT: 0-10

## 2024-05-30 ASSESSMENT — PAIN SCALES - GENERAL
PAINLEVEL_OUTOF10: 0 - NO PAIN
PAINLEVEL_OUTOF10: 0 - NO PAIN

## 2024-05-30 ASSESSMENT — ACTIVITIES OF DAILY LIVING (ADL): LACK_OF_TRANSPORTATION: NO

## 2024-05-30 NOTE — CARE PLAN
Problem: Fall/Injury  Goal: Use assistive devices by end of the shift  Outcome: Progressing   The patient's goals for the shift include      The clinical goals for the shift include Patient will be safet this shift    Assumed patient care at 0700. Patient had 3 BMs this shift. MD was notified his HR in the am was 114-141 . His metoprolol was increased to 37.5mg BID. Changed his External male catheter. UA sent. 500ml LR bolus given.

## 2024-05-30 NOTE — PROGRESS NOTES
Speech-Language Pathology                 Therapy Communication Note    Patient Name: Vinicius Arriola  MRN: [id number]  Today's Date: 5/30/2024     Discipline: Speech Language Pathology    Pt not available for Speech Therapy; off unit at Ultrasound.  Will continue to follow.        05/30/24 at 4:48 PM - Meri Hurley SLP

## 2024-05-30 NOTE — CARE PLAN
The patient's goals for the shift include      The clinical goals for the shift include patient will remain hemodynamicaly stable    Over the shift, the patient' was not tachycardic during night shift unless turning or placing on bedpan.

## 2024-05-30 NOTE — PROGRESS NOTES
"Subjective     Overnight events:  Patient was evaluated at the bedside with his wife present. No acute events overnight though had difficulty sleeping d/t some CPAP discomforts since resolved. Denies any Cardiopulmonary symptoms, subjective fever, night sweats, but reports being cold.     Discussed with the Mr. Arriola and his wife Kirsten regarding their current goals given their conversation with Dr. Ann. At this time we do not have any therapies that can safely be performed/administered for his mesothelioma. The family at this time has stated that they would not like to have anymore chemotherapy regardless if there were further treatment options. At this time they would like to speak with Hospice for an informational and future planning.      Vital signs:  Blood pressure 111/70, pulse (!) 115, temperature 37 °C (98.6 °F), temperature source Temporal, resp. rate 24, height 1.727 m (5' 7.99\"), weight 121 kg (266 lb 4.8 oz), SpO2 97%.    I/O last 3 completed shifts:  In: 2233.3 (18.5 mL/kg) [P.O.:780; I.V.:953.3 (7.9 mL/kg); Blood:500]  Out: 1550 (12.8 mL/kg) [Urine:1550 (0.4 mL/kg/hr)]  Weight: 120.8 kg     Physical Exam  Constitutional:       General: He is not in acute distress.  HENT:      Head: Normocephalic and atraumatic.   Eyes:      Extraocular Movements: Extraocular movements intact.   Pulmonary:      Effort: Pulmonary effort is normal.      Breath sounds: No wheezing, rhonchi or rales.      Comments: Decreased breath sounds on left  Abdominal:      General: Bowel sounds are normal. There is distension.      Palpations: Abdomen is soft.      Tenderness: There is no abdominal tenderness.   Musculoskeletal:      Comments: LLE rash resembles petechiae, not warm/painful/edematous when compared to other leg   Neurological:      General: No focal deficit present.      Mental Status: He is oriented to person, place, and time.         Relevant Results        Labs:  Last CBC:  Lab Results   Component Value Date    " WBC 5.1 05/30/2024    HGB 7.8 (L) 05/30/2024    HCT 24.4 (L) 05/30/2024    MCV 90 05/30/2024    PLT 59 (L) 05/30/2024       Last RFP:  Lab Results   Component Value Date    GLUCOSE 83 05/30/2024    CALCIUM 7.7 (L) 05/30/2024     05/30/2024    K 4.0 05/30/2024    CO2 23 05/30/2024     05/30/2024    BUN 51 (H) 05/30/2024    CREATININE 3.06 (H) 05/30/2024       Last LFTs:  Lab Results   Component Value Date    ALT 9 (L) 05/30/2024    AST 33 05/30/2024    ALKPHOS 62 05/30/2024    BILITOT 0.5 05/30/2024       Last coags:  Lab Results   Component Value Date    INR 1.1 05/26/2024    APTT 25 (L) 05/23/2024       Micro/culture data:  No results found for the last 90 days.      Imaging:  US chest  Narrative: Interpreted By:  Dinh Plunkett,   STUDY:  US CHEST; 5/29/20243:53 pm      INDICATION:  Signs/Symptoms:Thoracentesis (Left) diagnostic/therapeutic.      COMPARISON:  None.      ACCESSION NUMBER(S):  [id number]      ORDERING CLINICIAN:  ALEXANDRA AVILA      TECHNIQUE:  INTERVENTIONALIST(S):  Dinh Plunkett      CONSENT:  The patient/patient's POA/next of kin was informed of the nature of  the proposed procedure. The purposes, alternatives, risks, and  benefits were explained and discussed. All questions were answered  and consent was obtained.      SEDATION:  None      TIME OUT:  A time out was performed immediately prior to procedure start with  the interventional team, correctly identifying the patient name, date  of birth, MRN, procedure, anatomy (including marking of site and  side), patient position, procedure consent form, relevant laboratory  and imaging test results, antibiotic administration, safety  precautions, and procedure-specific equipment needs.      FINDINGS:  The patient was placed in the supine position.      The thoracic space was examined with grey scale ultrasound, and an  absence of free fluid was demonstrated on ultrasound. Thoracic images  were captured to demonstrate. A  thoracentesis was not performed.      Impression: Absence of free fluid for procedure. A thoracentesis was not  performed.      I personally performed and/or directly supervised this study and was  present for the entire procedure.      Performed and dictated at [facility].      Signed by: Dinh Plunkett 5/29/2024 3:53 PM  Dictation workstation:   [id number]  ECG 12 Lead  Atrial flutter with variable AV block  Rightward axis  Low voltage QRS  Nonspecific ST and T wave abnormality  Abnormal ECG  When compared with ECG of 16-MAY-2024 21:13,  Significant changes have occurred         Assessment/Plan   Principal Problem:    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Multi)  Active Problems:    Anemia    Vinicius Arriola is a 88 y.o. male with PMHx of PAD, afib/flutter, hx DVT on warfarin, HTN, CAD s/p stent, mitral regurg, HLD, polycythemia vera, managed by Dr. 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 Presented to [place] 5/14 with abdominal pain, fever, diarrhea, urinary retention c/f sepsis presumed 2/2 PNA; CT CAP showed known LLL collapse 2/2 mesothelioma, new post L chest wall invasion, and c/f L pulmonary trunk tumor thrombus.  5/23 developed shock and acute hypoxic resp failure requiring MICU transfer for pressors and NIV (airvo), thought 2/2 GI bleeding with melena and coffee ground drainage from NGT. Quickly weaned off pressors, s/p flex sig with blood in rectum but no active bleed. Now transferred back to floor for further mgmt.      Updates:   - Remains in Afib overnight and tele concerning for Afib vs Afutter; will order EKG for confirmation  - Per family discussion with Dr. Ann 5/29 there are no currently availbe therepies that can be safely offered for the mesothelioma, and the family does not wish to pursue further  "chemotherapy at this time regardless.   - Hospice c/s for informational  - IR consulted regarding L sided Thora unable to find drainable pocket.  - Plt stable above 50, resume AC (Heparin ggt; low intensity)  - Keflex 500 q8 5 day course for cellulitis  - Metop to 37.5mg BID  - 500 LR    #History Afib, HLD, CAD, PAD  #History DVT/PE on Warfarin  :: 5/21 TTE showing:  Left ventricular systolic function is hyperdynamic with a 70-75% estimated ejection fraction, Mildly elevated RVSP, Moderately dilated aortic root   Continues to have episodes of Afib w/ RVR occasional Aflut likely multifactorial  - Metop tartrate at 37.5mg BID  - Holding warfarin, aspirin  - Continue home statin  - Heparin ggt    #Worsening mesothelioma  #Tumor Thrombus  #Mesothelioma with Loculated Pleural Effusions   #Left Hilar Mass, Left Pulmonary Trunk Mass  #History JOVANNI, Pulmonary Embolism  :: primary oncologist Dr. Galvan  :: s/p L VATS w/ decort 5/2022, XRT 9-10/22  :: s/p \"half-dose\" pemfexy on 5/14  :: CT chest 5/18 with significant collapse of left lung, loculated pleural effusions consistent with patient's known mesothelioma.  Also shows ill-defined patchy infiltrate in right lower lobe.  Shows left hilar mass with extension into left pulmonary trunk likely tumor thrombus   - Holding warfarin, continue to monitor   - Dr. Henderson recommends transitioning to Eliquis on discharge, deferring anticoagulation iso thrombocytopenia. Plt stable above 50, resume AC (Heparin ggt; low intensity); plan to transition to Eliquis 5mg BID (if tolerating heparin)  - Continuing protocol w/ 1 mg folic acid daily  - Hospice Consult    #Tachypnea   #Elevated A-a gradient   #AHRF (Improved)  :: multifactorial, like d/t shunt secondary to malignancy vs deconditioning given prolonged admission vs intermittent Afib vs v/q mismatch   :: RR 23-44   :: 5/22 ABG: pH 7.53, pCO2 31, pO2 109, Lactate 2.2, FiO2 40.  - On 4L nasal cannula previously requiring intermittent " AIRVO to assist with work of breathing.   - gradually wean supplemental O2 for SpO2 goal >90%   - continue abx regimen  - CTM     #Coffee grounds via NGT (placed 5/21), improved   #Melena likely d/t GI bleed  :: KUB 5/21, showing moderate distention of stomach with nonspecific predominantly fluid-filled bowel pattern.  :: 5/20-5/21 patient emesis that is watery, dark brown, malodorous   :: did initially present w/ diarrhea could have been chemo induced vs stool ball  :: INR 4.9>6.3>2.4 >1.2  :: s/p Vitamin K 10 mg  :: KUB w/ dilated bowel loops   - Soft diet, encouraged patient to go slow  - holding ferrous sulfate    - pantoprazole 40 bid   - GI consulted, appreciate recs  -s/p flex sig with no evidence of active bleed              -Will hold off EGD since coffee ground emesis resolved     #Illeus (improved/resolved)  :: KUB 5/26 kub shows distended bowel loops likely ileus, will monitor for improvement with PO diet.   - NG tube clamped; If becoming nauseous or developing vomiting, can unclamp NGT and place to LIWS.  - Tolerating PO and having BM. Will CTM     #Pneumonia, Right lower lobe ill-defined patchy infiltrate (resolved)  :: Resp Cult salivary contamination  :: MRSA nares: negative  :: Strep/Legionella antigens: negative  :: CXR 5/21 showing persistent patchy right infrahilar airspace opacities; improved on serial CXRs  :: 5/22 CT CAP w/o contrast: Interval improvement of the right lower lobe patchy infiltrate  :: s/p Azithro (5/16-5/18)  :: s/p vanc (5/16-5/18) (5/20-5/22) and zosyn (5/16-5/18) (5/20-22)  -eropenem (5/19-5/20) (5/22-25) and micafungin (5/22-25)  -Abx stopped 5/25 given prolonged course with improvement/decline independent of abx, do not suspect active infection currently.       #Fever  #Sepsis most likely d/t pneumonia  :: at OSH met SIRS criteria (febrile, tachypnea)  :: UA 5/18 with small LE, 3+ bacteria, urine cx 5/21 negative  :: Resp Cult salivary contamination  :: MRSA nares:  negative  :: Strep/Legionella antigens: negative  :: c diff and enteric stool panel: negative  :: BC 5/16 NGTD, repeat BC 5/20 NGTD  :: CRP 30.46>42.36  :: CXR 5/21 showing persistent patchy right infrahilar airspace opacities    :: s/p Azithro (5/16-5/18)  :: asymmetric warmth, erythema, edema noted in left lower extremity   :: s/p vanc (5/16-5/18) (5/20-5/25) and zosyn (5/16-5/18) (5/20-22)  :: 5/22 CT CAP w/o contrast: Interval improvement of the right lower lobe patchy infiltrate, Interval flattening of the inferior vena cava which may be seen with hypovolemia, Improved proximal sigmoid diverticulitis.  - repeat blood cultures drawn 5/22, NGTD   - meropenem (5/19-5/20) (5/22-5/25) and micafungin (5/22-5/25); restarted Vanc/Sofy briefly overnight 5/27.     #Lower leg skin changes likely d/t cellulitis   :: previously improved margins of left lower extremity skin changes  - Margins worsened on morning exam 5/30, tender to palpation, slightly warmer than adjacent leg.   - CTM  - Plan for oral (5 days)    #Anemia, improved  :: Likely 2/2 GIB noted this admission  :: Melena and coffee grounds via NGT noted 5/22  :: Hgb initially 10, decreased to 6.9 on 5/23, now stable since 5/24  - S/p 1u pRBC 5/23  - Continue to trend Hgb BID (goal > 8), platelets goal > 20. Platelet goal liberalized as no active bleeding, if hgb decreasing or bleeding clinically then transfuse to goal 50        #VIK on CKD, likely prerenal d/t fluid status   # CKD (baseline Cr 1.3-1.4)  # urine retention s/p Boyd Catheter Placement by Urology in OSH  Estimated Creatinine Clearance: 21.9 mL/min (A) (by C-G formula based on SCr of 3.06 mg/dL (H)).  :: US renal 5/16: Nonobstructing 1 cm left renal calculus, no hydronephrosis  :: Cr 1.64>2.26>3.00 (Baseline Cr 1.3-1.4)  :: Urine electrolytes pre-renal, Net negative, hypernatremia with prolonged NPO all suggest pre-renal hypovolemic VIK. Will encourage PO intake  - will eventually need boyd removal  and trial  - strict in/out  - tamsulosin 0.4 mg daily  -minimize nephrotoxic medications as able  - boyd removed 5/23/24 (5/16-5/23)     #Pancytopenia, most likely medication induced from pemfexy, resolved  #Thrombocytopenia  :: c/f TTP  ::   :: s/p pemfexy 5/14  :: d dimer 1521, fibrinogen >1000- no c/f dic  :: no schistocytes on smear  ::   :: filgrastim 480 mcg (5/21-5/22)  - 5/23 ANC 4130   - haptoglobin 400   - Platelets 55>38, ctm. Goal >10  - s/p 1u platelets 5/23 (was bleeding at the time)     #Elevated INR  :: INR 4.9>6.3>2.4 >1.2  :: s/p Vitamin K 10 mg  -CTM    # Hospital-acquired Delirium  :: waxing and waning altered mental status  -Delirium precautions  -hold home trazodone for insomnia      Antibiotics:: none  Dispo: SNF.      F: Replete PRN  E: Replete PRN  N: Regular Diet  DVT Ppx: None iso of low Plt  GI Ppx: Pantoprazole   Access/Lines: PIV x3  Boyd External (5/23- )  Abx: None  O2: 4L NC    Pain regimen: Tylenol   GI Laxative: None     Code status: DNR and No Intubation  Emergency contact:Kirsten Arriola (Spouse) [phone]        Ramesh Conley MD  PGY-1 Internal Medicine

## 2024-05-30 NOTE — DOCUMENTATION CLARIFICATION NOTE
"    PATIENT:               ZAKIYA LAWSON  ACCT #:                  [id number]  MRN:                       [id number]  :                       1935  ADMIT DATE:       2024 6:29 PM  DISCH DATE:  RESPONDING PROVIDER #:        [id number]          PROVIDER RESPONSE TEXT:    Sepsis, POA, with Renal Organ dysfunction of VIK    CDI QUERY TEXT:    Clarification        Instruction:  Based on your assessment of the patient and the clinical information, please provide the requested documentation by clicking on the appropriate radio button and enter any additional information if prompted.    Question: Please further clarify the diagnosis of Sepsis and with organ dysfunction in the setting of Gram Negative Pneumonia as    When answering this query, please exercise your independent professional judgment. The fact that a question is being asked, does not imply that any particular answer is desired or expected.    The patient's clinical indicators include:  Clinical Information: 87 yo M with PMHx of malignant mesothelioma on chemo, admitted from OSH where he was treated empirically for sepsis presumed due to RLL Pneumonia.    Clinical Indicators:  Consult- GI  at 0951:  \"admitted to [facility] with sepsis  worsening coagulopathy and thrombocytopenia. Pending DIC and hemolysis workup. Likely in the setting of ongoing fevers/ sepsis.\"    PN- Oncology  at 1310:  Admitted to [facility] and treated empirically for sepsis presumed due to RLL Pneumonia iso of CT CAP showing significant collapse of the left lung worsened appearence of known Mesothelioma with concern for tumor thrombus, new chest wall invasion, right lower lobe ill-defined patchy infiltrate  - INR 6.3 supratherapeutic, 2.4 s/p 5mg Vitamin K, continuing to hold warfarin  - 5 mg additional vitamin k  - d dimer 1521, fibrinogen >1000- no c/f dic  Fever  Pneumonia, Right lower lobe ill-defined patchy infiltrate  Sepsis most likely d/t pneumonia  AHRF, improved  - " "multifactorial, volume up on exam so may be component of pulmonary edema w/ pneumonia  - at OSH met SIRS criteria febrile, tachypnea  - Resp Cult salivary contamination  - MRSA nares: negative  - Strep/Legionella antigens: negative  - BC 5/16 NGTD, repeat BC 5/20 NGTD  - CXR 5/21 showing persistent patchy right infrahilar airspace opacities  - s/p Azithro 5/16-5/18  - holding vanc 5/16-5/18, 5/20-5/22 and zosyn 5/16-5/18, 5/20-22  - switch to meropenem 5/19-5/20, 5/22- and micafungin 5/22-  - repeat blood cultures drawn 5/22, pending  VIK on CKD  baseline Cr 1.3-1.4  Cr 1.64 - 2.26  -minimize nephrotoxic medications as able  - CTM\"    Documentation Clarification Note 5/23 at 0752:  \"Gram Negative PNA\"    - VS 5/19 at 1843: T 37.2, HR 96, RR 22, /74, SpO2 95 on O2  - INR 5/19-5/23: 2.7, 3.4, 4.8, 6.3, 2.4, 1.2    Treatment:  -azithro, vanc, zosyn, meropenem, micafungin  -minimize nephrotoxic medications as able  -Vitamin K  -holding warfarin  -labs, cultures, imaging    Risk Factors:  -PNA  -malignant mesothelioma  -coagulopathy, VIK  Options provided:  -- Sepsis, POA, with Hematologic organ dysfunction of Coagulopathy  -- Sepsis, not POA, with Renal organ dysfunction of VIK  -- Sepsis with other organ dysfunction, Please specify Sepsis POA status and sepsis association organ dysfunction  -- Sepsis resolved at OSH, Patient treated for Gram Negative Pneumonia without sepsis during this admission  -- Other - I will add my own diagnosis  -- Refer to Clinical Documentation Reviewer    Query created by: Gloria Berumen on 5/23/2024 10:45 AM      Electronically signed by:  ARIADNE ACEVEDO MD 5/30/2024 7:54 AM          "

## 2024-05-30 NOTE — PROGRESS NOTES
05/30/24 1200   Discharge Planning   Living Arrangements Spouse/significant other   Support Systems Spouse/significant other;Children   Type of Residence Private residence   Number of Stairs to Enter Residence 3   Do you have animals or pets at home? No   Who is requesting discharge planning? Provider   Home or Post Acute Services Post acute facilities (Rehab/SNF/etc)   Type of Post Acute Facility Services Rehab   Financial Resource Strain   How hard is it for you to pay for the very basics like food, housing, medical care, and heating? Not very   Housing Stability   In the last 12 months, was there a time when you were not able to pay the mortgage or rent on time? N   In the last 12 months, how many places have you lived? 1   In the last 12 months, was there a time when you did not have a steady place to sleep or slept in a shelter (including now)? N   Transportation Needs   In the past 12 months, has lack of transportation kept you from medical appointments or from getting medications? no   In the past 12 months, has lack of transportation kept you from meetings, work, or from getting things needed for daily living? No     SW received referral from care team for hospice care.  SW met with pt and spouse, Kirsten.  Hospice care, philosophy, and services were discussed.  Pt and spouse wish to have an information meeting with hospice agency.  SW provided choice of hospice agencies.  [facility] is desired.  Referral made to [facility] via [facility].  Will follow.  MICHELLE Vera    Representative from [facility] is meeting with pt and spouse between 1:30 - 2pm 5/31.  MICHELLE Vera

## 2024-05-30 NOTE — PROGRESS NOTES
Occupational Therapy                 Therapy Communication Note    Patient Name: Vinicius Arriola  MRN: [id number]  Today's Date: 5/30/2024     Discipline: Occupational Therapy    Missed Visit Reason: Missed Visit Reason:  (Pt required rapid response this AM; 1050 attempt to see pt-medical team in pt room assessing status. 1215-pt/family requesting meeting with hospice to determine goals of care.  OT treatment not complete this date.)    Missed Time: Attempt    Comment: pending hospice meeting

## 2024-05-30 NOTE — PROGRESS NOTES
Physical Therapy                 Therapy Communication Note    Patient Name: Vinicius Arriola  MRN: [id number]  Today's Date: 5/30/2024     Discipline: Physical Therapy    Missed Visit Reason: Missed Visit Reason:  (Rapid response this AM. Attempted to see pt, medical team present in room.)    Missed Time: Attempt

## 2024-05-30 NOTE — SIGNIFICANT EVENT
Rapid Response RN Note    Rapid response RN at bedside for RADAR score 7 due to the following VS: T 36.8 °Celsius; ; RR 22; /77; SPO2 100%.     Reviewed above VS with bedside RN. Rechecked HR, 113. VS within patient's current trends.  Patient denied pain, shortness of breath, dizziness or lightheadedness.  No interventions by rapid response team indicated at this time.      Staff to page rapid response for any concerns or acute change in condition/VS.

## 2024-05-31 LAB
ALBUMIN SERPL BCP-MCNC: 2.4 G/DL (ref 3.4–5)
ALP SERPL-CCNC: 61 U/L (ref 33–136)
ALT SERPL W P-5'-P-CCNC: 8 U/L (ref 10–52)
ANION GAP SERPL CALC-SCNC: 14 MMOL/L (ref 10–20)
AST SERPL W P-5'-P-CCNC: 31 U/L (ref 9–39)
ATRIAL RATE: 278 BPM
BASOPHILS # BLD AUTO: 0.01 X10*3/UL (ref 0–0.1)
BASOPHILS NFR BLD AUTO: 0.2 %
BILIRUB SERPL-MCNC: 0.5 MG/DL (ref 0–1.2)
BUN SERPL-MCNC: 46 MG/DL (ref 6–23)
CALCIUM SERPL-MCNC: 7.6 MG/DL (ref 8.6–10.6)
CHLORIDE SERPL-SCNC: 105 MMOL/L (ref 98–107)
CO2 SERPL-SCNC: 24 MMOL/L (ref 21–32)
CREAT SERPL-MCNC: 3.2 MG/DL (ref 0.5–1.3)
EGFRCR SERPLBLD CKD-EPI 2021: 18 ML/MIN/1.73M*2
EOSINOPHIL # BLD AUTO: 0.14 X10*3/UL (ref 0–0.4)
EOSINOPHIL NFR BLD AUTO: 2.5 %
ERYTHROCYTE [DISTWIDTH] IN BLOOD BY AUTOMATED COUNT: 16.6 % (ref 11.5–14.5)
GLUCOSE SERPL-MCNC: 90 MG/DL (ref 74–99)
HCT VFR BLD AUTO: 23.9 % (ref 41–52)
HGB BLD-MCNC: 7.5 G/DL (ref 13.5–17.5)
IMM GRANULOCYTES # BLD AUTO: 0.07 X10*3/UL (ref 0–0.5)
IMM GRANULOCYTES NFR BLD AUTO: 1.3 % (ref 0–0.9)
LYMPHOCYTES # BLD AUTO: 0.24 X10*3/UL (ref 0.8–3)
LYMPHOCYTES NFR BLD AUTO: 4.3 %
MAGNESIUM SERPL-MCNC: 1.95 MG/DL (ref 1.6–2.4)
MCH RBC QN AUTO: 28.8 PG (ref 26–34)
MCHC RBC AUTO-ENTMCNC: 31.4 G/DL (ref 32–36)
MCV RBC AUTO: 92 FL (ref 80–100)
MONOCYTES # BLD AUTO: 0.22 X10*3/UL (ref 0.05–0.8)
MONOCYTES NFR BLD AUTO: 4 %
NEUTROPHILS # BLD AUTO: 4.85 X10*3/UL (ref 1.6–5.5)
NEUTROPHILS NFR BLD AUTO: 87.7 %
NRBC BLD-RTO: 0 /100 WBCS (ref 0–0)
PHOSPHATE SERPL-MCNC: 4.3 MG/DL (ref 2.5–4.9)
PLATELET # BLD AUTO: 66 X10*3/UL (ref 150–450)
POTASSIUM SERPL-SCNC: 4.1 MMOL/L (ref 3.5–5.3)
PROT SERPL-MCNC: 5.1 G/DL (ref 6.4–8.2)
Q ONSET: 226 MS
QRS COUNT: 18 BEATS
QRS DURATION: 80 MS
QT INTERVAL: 324 MS
QTC CALCULATION(BAZETT): 454 MS
QTC FREDERICIA: 406 MS
R AXIS: 58 DEGREES
RBC # BLD AUTO: 2.6 X10*6/UL (ref 4.5–5.9)
SODIUM SERPL-SCNC: 139 MMOL/L (ref 136–145)
T AXIS: 76 DEGREES
T OFFSET: 388 MS
UFH PPP CHRO-ACNC: 0.4 IU/ML
VENTRICULAR RATE: 118 BPM
WBC # BLD AUTO: 5.5 X10*3/UL (ref 4.4–11.3)

## 2024-05-31 PROCEDURE — 80053 COMPREHEN METABOLIC PANEL: CPT

## 2024-05-31 PROCEDURE — 84100 ASSAY OF PHOSPHORUS: CPT | Performed by: STUDENT IN AN ORGANIZED HEALTH CARE EDUCATION/TRAINING PROGRAM

## 2024-05-31 PROCEDURE — 85025 COMPLETE CBC W/AUTO DIFF WBC: CPT

## 2024-05-31 PROCEDURE — 2500000002 HC RX 250 W HCPCS SELF ADMINISTERED DRUGS (ALT 637 FOR MEDICARE OP, ALT 636 FOR OP/ED)

## 2024-05-31 PROCEDURE — 2500000001 HC RX 250 WO HCPCS SELF ADMINISTERED DRUGS (ALT 637 FOR MEDICARE OP)

## 2024-05-31 PROCEDURE — 92526 ORAL FUNCTION THERAPY: CPT | Mod: GN

## 2024-05-31 PROCEDURE — 2500000004 HC RX 250 GENERAL PHARMACY W/ HCPCS (ALT 636 FOR OP/ED): Performed by: STUDENT IN AN ORGANIZED HEALTH CARE EDUCATION/TRAINING PROGRAM

## 2024-05-31 PROCEDURE — 99233 SBSQ HOSP IP/OBS HIGH 50: CPT

## 2024-05-31 PROCEDURE — C9113 INJ PANTOPRAZOLE SODIUM, VIA: HCPCS | Performed by: STUDENT IN AN ORGANIZED HEALTH CARE EDUCATION/TRAINING PROGRAM

## 2024-05-31 PROCEDURE — 36415 COLL VENOUS BLD VENIPUNCTURE: CPT

## 2024-05-31 PROCEDURE — 83735 ASSAY OF MAGNESIUM: CPT | Performed by: STUDENT IN AN ORGANIZED HEALTH CARE EDUCATION/TRAINING PROGRAM

## 2024-05-31 PROCEDURE — 2500000005 HC RX 250 GENERAL PHARMACY W/O HCPCS: Performed by: STUDENT IN AN ORGANIZED HEALTH CARE EDUCATION/TRAINING PROGRAM

## 2024-05-31 PROCEDURE — 85520 HEPARIN ASSAY: CPT

## 2024-05-31 PROCEDURE — 1200000003 HC ONCOLOGY  ROOM WITH TELEMETRY DAILY

## 2024-05-31 PROCEDURE — 2500000002 HC RX 250 W HCPCS SELF ADMINISTERED DRUGS (ALT 637 FOR MEDICARE OP, ALT 636 FOR OP/ED): Mod: MUE

## 2024-05-31 PROCEDURE — 2500000004 HC RX 250 GENERAL PHARMACY W/ HCPCS (ALT 636 FOR OP/ED)

## 2024-05-31 RX ORDER — METOPROLOL TARTRATE 50 MG/1
50 TABLET ORAL 2 TIMES DAILY
Status: DISCONTINUED | OUTPATIENT
Start: 2024-05-31 | End: 2024-06-01

## 2024-05-31 RX ORDER — BENZONATATE 100 MG/1
100 CAPSULE ORAL 3 TIMES DAILY PRN
Status: DISCONTINUED | OUTPATIENT
Start: 2024-05-31 | End: 2024-06-11 | Stop reason: HOSPADM

## 2024-05-31 RX ADMIN — PANTOPRAZOLE SODIUM 40 MG: 40 INJECTION, POWDER, FOR SOLUTION INTRAVENOUS at 20:09

## 2024-05-31 RX ADMIN — HEPARIN SODIUM 1650 UNITS/HR: 10000 INJECTION, SOLUTION INTRAVENOUS at 16:59

## 2024-05-31 RX ADMIN — PANTOPRAZOLE SODIUM 40 MG: 40 INJECTION, POWDER, FOR SOLUTION INTRAVENOUS at 09:53

## 2024-05-31 RX ADMIN — Medication 2 L/MIN: at 20:00

## 2024-05-31 RX ADMIN — DEXTROMETHORPHAN 30 MG: 30 SUSPENSION, EXTENDED RELEASE ORAL at 20:09

## 2024-05-31 RX ADMIN — METOPROLOL TARTRATE 37.5 MG: 25 TABLET, FILM COATED ORAL at 09:53

## 2024-05-31 RX ADMIN — BENZONATATE 100 MG: 100 CAPSULE ORAL at 09:53

## 2024-05-31 RX ADMIN — DEXTROMETHORPHAN 30 MG: 30 SUSPENSION, EXTENDED RELEASE ORAL at 09:53

## 2024-05-31 RX ADMIN — CEPHALEXIN 500 MG: 500 CAPSULE ORAL at 05:46

## 2024-05-31 RX ADMIN — TAMSULOSIN HYDROCHLORIDE 0.4 MG: 0.4 CAPSULE ORAL at 09:53

## 2024-05-31 RX ADMIN — Medication 2 L/MIN: at 08:00

## 2024-05-31 RX ADMIN — ROSUVASTATIN CALCIUM 10 MG: 10 TABLET, FILM COATED ORAL at 20:09

## 2024-05-31 RX ADMIN — NYSTATIN 1 APPLICATION: 100000 POWDER TOPICAL at 20:11

## 2024-05-31 RX ADMIN — CEPHALEXIN 500 MG: 500 CAPSULE ORAL at 22:18

## 2024-05-31 RX ADMIN — CEPHALEXIN 500 MG: 500 CAPSULE ORAL at 14:02

## 2024-05-31 RX ADMIN — HEPARIN SODIUM 1650 UNITS/HR: 10000 INJECTION, SOLUTION INTRAVENOUS at 02:08

## 2024-05-31 RX ADMIN — OXYCODONE HYDROCHLORIDE AND ACETAMINOPHEN 1000 MG: 500 TABLET ORAL at 09:53

## 2024-05-31 RX ADMIN — METOPROLOL TARTRATE 50 MG: 50 TABLET, FILM COATED ORAL at 20:09

## 2024-05-31 RX ADMIN — NYSTATIN 1 APPLICATION: 100000 POWDER TOPICAL at 09:53

## 2024-05-31 ASSESSMENT — COGNITIVE AND FUNCTIONAL STATUS - GENERAL
CLIMB 3 TO 5 STEPS WITH RAILING: A LOT
TURNING FROM BACK TO SIDE WHILE IN FLAT BAD: A LOT
DRESSING REGULAR LOWER BODY CLOTHING: TOTAL
MOVING FROM LYING ON BACK TO SITTING ON SIDE OF FLAT BED WITH BEDRAILS: A LOT
MOBILITY SCORE: 12
DAILY ACTIVITIY SCORE: 11
DRESSING REGULAR UPPER BODY CLOTHING: A LOT
PERSONAL GROOMING: A LOT
TOILETING: A LOT
HELP NEEDED FOR BATHING: A LOT
EATING MEALS: A LOT
STANDING UP FROM CHAIR USING ARMS: A LOT
WALKING IN HOSPITAL ROOM: A LOT
MOVING TO AND FROM BED TO CHAIR: A LOT

## 2024-05-31 ASSESSMENT — PAIN - FUNCTIONAL ASSESSMENT
PAIN_FUNCTIONAL_ASSESSMENT: 0-10
PAIN_FUNCTIONAL_ASSESSMENT: 0-10

## 2024-05-31 ASSESSMENT — PAIN SCALES - GENERAL
PAINLEVEL_OUTOF10: 0 - NO PAIN

## 2024-05-31 NOTE — PROGRESS NOTES
"Subjective     Overnight events:  Patient was evaluated at the bedside with his wife present. No acute events overnight. Continues to denies any cardiopulmonary symptoms, subjective fever, night sweats, but reports being cold. Greatest complaint this morning is his non-productive coughing fits which persist despite dextromethorphan. Family is looking forward for to meeting with Hospice for informational this afternoon (~1330).    Vital signs:  Blood pressure 117/71, pulse 109, temperature 36.3 °C (97.3 °F), resp. rate 18, height 1.727 m (5' 7.99\"), weight 121 kg (266 lb 4.8 oz), SpO2 96%.    I/O last 3 completed shifts:  In: 680 (5.6 mL/kg) [P.O.:180; IV Piggyback:500]  Out: 950 (7.9 mL/kg) [Urine:950 (0.2 mL/kg/hr)]  Weight: 120.8 kg     Physical Exam  Constitutional:       General: He is not in acute distress.  HENT:      Head: Normocephalic and atraumatic.   Eyes:      Extraocular Movements: Extraocular movements intact.   Pulmonary:      Effort: Pulmonary effort is normal.      Breath sounds: No wheezing, rhonchi or rales.      Comments: Decreased breath sounds on left  Abdominal:      General: Bowel sounds are normal. There is distension.      Palpations: Abdomen is soft.      Tenderness: There is no abdominal tenderness.   Musculoskeletal:      Comments: LLE rash resembles petechiae, not warm/painful/edematous when compared to other leg   Neurological:      General: No focal deficit present.      Mental Status: He is oriented to person, place, and time.         Relevant Results        Labs:  Last CBC:  Lab Results   Component Value Date    WBC 5.1 05/30/2024    HGB 7.8 (L) 05/30/2024    HCT 24.4 (L) 05/30/2024    MCV 90 05/30/2024    PLT 59 (L) 05/30/2024       Last RFP:  Lab Results   Component Value Date    GLUCOSE 83 05/30/2024    CALCIUM 7.7 (L) 05/30/2024     05/30/2024    K 4.0 05/30/2024    CO2 23 05/30/2024     05/30/2024    BUN 51 (H) 05/30/2024    CREATININE 3.06 (H) 05/30/2024       Last " LFTs:  Lab Results   Component Value Date    ALT 9 (L) 05/30/2024    AST 33 05/30/2024    ALKPHOS 62 05/30/2024    BILITOT 0.5 05/30/2024       Last coags:  Lab Results   Component Value Date    INR 1.1 05/26/2024    APTT 28 05/30/2024       Micro/culture data:  No results found for the last 90 days.      Imaging:  ECG 12 Lead  Atrial flutter with variable AV block with premature ventricular or aberrantly conducted complexes  Low voltage QRS  Abnormal ECG  When compared with ECG of 28-MAY-2024 01:40,  Atrial flutter has replaced Atrial fibrillation  Nonspecific T wave abnormality, improved in Inferior leads  Lower extremity venous duplex left  Narrative: Interpreted By:  Nathan Argueta,  and Chaparrita Randolph   STUDY:  [facility] US LOWER EXTREMITY VENOUS DUPLEX LEFT;  5/30/2024 3:58 pm      INDICATION:  Signs/Symptoms:LLE swelling, eval for DVT.      COMPARISON:  None.      ACCESSION NUMBER(S):  [id number]      ORDERING CLINICIAN:  ALEXANDRA AVILA      TECHNIQUE:  Vascular ultrasound of the left lower extremity was performed.  Real-time compression views as well as Gray scale, color Doppler and  spectral Doppler waveform analysis was performed.      FINDINGS:  Evaluation of the visualized portions of the left common femoral  vein, proximal, mid, and distal femoral vein, and popliteal vein were  performed.  Evaluation of the visualized portions of the  posterior  tibial and peroneal veins were also performed. Evaluation of the calf  veins limited due to edema.      Echogenic intraluminal material in the proximal greater saphenous  vein with partial compressibility and color Doppler signal The  evaluated veins demonstrate normal compressibility. There is intact  venous flow demonstrating normal respiratory variability and normal  augmentation of flow with calf compression. Therefore, there is no  ultrasonographic evidence for deep vein thrombosis within the  evaluated veins.      Impression: 1.  No sonographic evidence for deep  vein thrombosis within the  evaluated veins of the left lower extremity.  2. Nonocclusive thrombus of the proximal greater saphenous vein, a  superficial vein. Recommend correlation with concern for superficial  thrombophlebitis.      I personally reviewed the images/study and I agree with the findings  as stated by Agustín Cheatham MD. This study was interpreted at  [facility], [place], [place].      MACRO:  None      Signed by: Nathan Argueta 5/31/2024 5:32 AM  Dictation workstation:   [id number]         Assessment/Plan   Principal Problem:    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Multi)  Active Problems:    Anemia    Vinicius Arriola is a 88 y.o. male with PMHx of PAD, afib/flutter, hx DVT on warfarin, HTN, CAD s/p stent, mitral regurg, HLD, polycythemia vera, managed by Dr. 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 Presented to [facility] 5/14 with abdominal pain, fever, diarrhea, urinary retention c/f sepsis presumed 2/2 PNA; CT CAP showed known LLL collapse 2/2 mesothelioma, new post L chest wall invasion, and c/f L pulmonary trunk tumor thrombus.  5/23 developed shock and acute hypoxic resp failure requiring MICU transfer for pressors and NIV (airvo), thought 2/2 GI bleeding with melena and coffee ground drainage from NGT. Quickly weaned off pressors, s/p flex sig with blood in rectum but no active bleed. Now transferred back to floor for further mgmt.      Updates:   - Remains in Afib overnight rate controlled Noted to be in Aflutter on EKG yesterday morning but appears to have remained in Afib following on tele review.  - Increase Metop to 50mg BID   - Hospice c/s for informational this afternoon 1330.      #History Afib, HLD, CAD, PAD  #History DVT/PE on Warfarin  :: 5/21 TTE showing:  Left ventricular systolic function is  "hyperdynamic with a 70-75% estimated ejection fraction, Mildly elevated RVSP, Moderately dilated aortic root   Continues to have episodes of Afib w/ RVR occasional Aflut likely multifactorial  - Metop tartrate at 50mg BID  - Holding warfarin, aspirin  - Continue home statin  - Heparin ggt    #Worsening mesothelioma  #Tumor Thrombus  #Mesothelioma with Loculated Pleural Effusions   #Left Hilar Mass, Left Pulmonary Trunk Mass  #History JOVANNI, Pulmonary Embolism  :: primary oncologist Dr. Galvan  :: s/p L VATS w/ decort 5/2022, XRT 9-10/22  :: s/p \"half-dose\" pemfexy on 5/14  :: CT chest 5/18 with significant collapse of left lung, loculated pleural effusions consistent with patient's known mesothelioma.  Also shows ill-defined patchy infiltrate in right lower lobe.  Shows left hilar mass with extension into left pulmonary trunk likely tumor thrombus   - Holding warfarin, continue to monitor   - Dr. Henderson recommends transitioning to Eliquis on discharge, deferring anticoagulation iso thrombocytopenia. Plt stable above 50, resume AC (Heparin ggt; low intensity); plan to transition to Eliquis 5mg BID (if tolerating heparin)  - Continuing protocol w/ 1 mg folic acid daily  - Hospice Consult    #Tachypnea   #Elevated A-a gradient   #AHRF (Improved)  :: multifactorial, like d/t shunt secondary to malignancy vs deconditioning given prolonged admission vs intermittent Afib vs v/q mismatch   :: RR 23-44   :: 5/22 ABG: pH 7.53, pCO2 31, pO2 109, Lactate 2.2, FiO2 40.  - On 4L nasal cannula previously requiring intermittent AIRVO to assist with work of breathing.   - gradually wean supplemental O2 for SpO2 goal >90%   - continue abx regimen  - CTM     #Coffee grounds via NGT (placed 5/21), improved   #Melena likely d/t GI bleed  :: KUB 5/21, showing moderate distention of stomach with nonspecific predominantly fluid-filled bowel pattern.  :: 5/20-5/21 patient emesis that is watery, dark brown, malodorous   :: did initially present " w/ diarrhea could have been chemo induced vs stool ball  :: INR 4.9>6.3>2.4 >1.2  :: s/p Vitamin K 10 mg  :: KUB w/ dilated bowel loops   - Soft diet, encouraged patient to go slow  - holding ferrous sulfate    - pantoprazole 40 bid   - GI consulted, appreciate recs  -s/p flex sig with no evidence of active bleed              -Will hold off EGD since coffee ground emesis resolved     #Illeus (improved/resolved)  :: KUB 5/26 kub shows distended bowel loops likely ileus, will monitor for improvement with PO diet.   - NG tube clamped; If becoming nauseous or developing vomiting, can unclamp NGT and place to LIWS.  - Tolerating PO and having BM. Will CTM     #Pneumonia, Right lower lobe ill-defined patchy infiltrate (resolved)  :: Resp Cult salivary contamination  :: MRSA nares: negative  :: Strep/Legionella antigens: negative  :: CXR 5/21 showing persistent patchy right infrahilar airspace opacities; improved on serial CXRs  :: 5/22 CT CAP w/o contrast: Interval improvement of the right lower lobe patchy infiltrate  :: s/p Azithro (5/16-5/18)  :: s/p vanc (5/16-5/18) (5/20-5/22) and zosyn (5/16-5/18) (5/20-22)  -eropenem (5/19-5/20) (5/22-25) and micafungin (5/22-25)  -Abx stopped 5/25 given prolonged course with improvement/decline independent of abx, do not suspect active infection currently.       #Fever  #Sepsis most likely d/t pneumonia  :: at OSH met SIRS criteria (febrile, tachypnea)  :: UA 5/18 with small LE, 3+ bacteria, urine cx 5/21 negative  :: Resp Cult salivary contamination  :: MRSA nares: negative  :: Strep/Legionella antigens: negative  :: c diff and enteric stool panel: negative  :: BC 5/16 NGTD, repeat BC 5/20 NGTD  :: CRP 30.46>42.36  :: CXR 5/21 showing persistent patchy right infrahilar airspace opacities    :: s/p Azithro (5/16-5/18)  :: asymmetric warmth, erythema, edema noted in left lower extremity   :: s/p vanc (5/16-5/18) (5/20-5/25) and zosyn (5/16-5/18) (5/20-22)  :: 5/22 CT CAP w/o  contrast: Interval improvement of the right lower lobe patchy infiltrate, Interval flattening of the inferior vena cava which may be seen with hypovolemia, Improved proximal sigmoid diverticulitis.  - repeat blood cultures drawn 5/22, NGTD   - meropenem (5/19-5/20) (5/22-5/25) and micafungin (5/22-5/25); restarted Vanc/Sofy briefly overnight 5/27.     #Lower leg skin changes likely d/t cellulitis   :: previously improved margins of left lower extremity skin changes  - Margins worsened on morning exam 5/30, tender to palpation, slightly warmer than adjacent leg.   - CTM  - Keflex 500 q8 5 day course for cellulitis (5/30-6/3)    #Anemia, improved  :: Likely 2/2 GIB noted this admission  :: Melena and coffee grounds via NGT noted 5/22  :: Hgb initially 10, decreased to 6.9 on 5/23, now stable since 5/24  - S/p 1u pRBC 5/23  - Continue to trend Hgb BID (goal > 8), platelets goal > 20. Platelet goal liberalized as no active bleeding, if hgb decreasing or bleeding clinically then transfuse to goal 50        #VIK on CKD, likely prerenal d/t fluid status   # CKD (baseline Cr 1.3-1.4)  # urine retention s/p Boyd Catheter Placement by Urology in OSH  Estimated Creatinine Clearance: 21.9 mL/min (A) (by C-G formula based on SCr of 3.06 mg/dL (H)).  :: US renal 5/16: Nonobstructing 1 cm left renal calculus, no hydronephrosis  :: Cr 1.64>2.26>3.00 (Baseline Cr 1.3-1.4)  :: Urine electrolytes pre-renal, Net negative, hypernatremia with prolonged NPO all suggest pre-renal hypovolemic VIK. Will encourage PO intake  - will eventually need boyd removal and trial  - strict in/out  - tamsulosin 0.4 mg daily  -minimize nephrotoxic medications as able  - boyd removed 5/23/24 (5/16-5/23)     #Pancytopenia, most likely medication induced from pemfexy, resolved  #Thrombocytopenia  :: c/f TTP  ::   :: s/p pemfexy 5/14  :: d dimer 1521, fibrinogen >1000- no c/f dic  :: no schistocytes on smear  ::   :: filgrastim 480 mcg  (5/21-5/22)  - 5/23 ANC 4130   - haptoglobin 400   - Platelets 55>38, ctm. Goal >10  - s/p 1u platelets 5/23 (was bleeding at the time)     #Elevated INR  :: INR 4.9>6.3>2.4 >1.2  :: s/p Vitamin K 10 mg  -CTM    # Hospital-acquired Delirium  :: waxing and waning altered mental status  -Delirium precautions  -hold home trazodone for insomnia      Antibiotics:: none  Dispo: SNF.      F: Replete PRN  E: Replete PRN  N: Regular Diet  DVT Ppx: None iso of low Plt  GI Ppx: Pantoprazole   Access/Lines: PIV x3  Forrest External (5/23- )  Abx: None  O2: 3L NC    Pain regimen: Tylenol   GI Laxative: None     Code status: DNR and No Intubation  Emergency contact:Kirsten Arriola (Spouse) [phone]        Ramesh Conley MD  PGY-1 Internal Medicine

## 2024-05-31 NOTE — SIGNIFICANT EVENT
05/31/24 1718   Onset Documentation   Rapid Response Initiated By Radar auto page   Location/Room [facility]  ([facility] 8652)   Pager Time 1717   Arrival Time 1718   Event End Time 1722   Level II Called No   Primary Reason for Call Radar auto page     Rapid Response Note    Radar auto-page received for a radar score of 6 with the following vital signs: 36.6, 100, 25, 120/69, 98%.  Vital signs were confirmed and reviewed with primary RN.  There have been no acute changes.  There are no indications for interventions by Rapid Response at this time.  RN to contact Rapid Response with any future concerns or signs of clinical decompensation.

## 2024-05-31 NOTE — PROGRESS NOTES
Met with patient, his wife Veronique and daughter, Tina davidson. Reviewed Hospice philosophy and scope of services including IPU and transitons program ( two week residential care). Pt slept on and off during visit. Wife, Veronique, would like pt to skilled to get some rehab before coming home. Caregiver Handbook and phone number given. Knows she can call at any time.     Thank you,  Tamiko Beltrán RN  [facility]

## 2024-05-31 NOTE — PROGRESS NOTES
"Speech-Language Pathology    SLP Adult Inpatient  Speech-Language Pathology Treatment     Patient Name: Vinicius Arriola  MRN: [id number]  Today's Date: 5/31/2024  Time Calculation  Start Time: 1104  Stop Time: 1114  Time Calculation (min): 10 min       Assessment/Impression:   Mild oropharyngeal dysphagia per MBSS completed 5/25; characterized by prolonged mastication/bolus formation and mild residue at the valleculae following the swallow.      Recommendations:  Solid Diet Recommendations : Easy to Chew  Liquid Diet Recommendations: Thin   Medication Administration: Single w/ sips of water, whole in puree as needed      Safe Swallow Strategies/Guidelines:  Only present PO intake when awake and alert  Supervision/assist w/ PO intake to assure adherence to safe swallow strategies   Upright positioning   Slow rate/small boluses   Single sips        Plan:  No further SLP services warranted     If any changes to mentation/medical status and/or s/s of aspiration demonstrated during PO intake, please make NPO AND reconsult SLP to reassess swallow function.         Subjective:  Pt properly identified and treated bedside.  Awake and alert, with no c/o pain.  O2 via NC.  Family present x4.  Intermittent congested cough unrelated to PO intake.       Relevant SLP Hx:  Per documentation, \"Vinicius Arriola is a 88 y.o. year old male patient with Past Medical History of  PAD, afib/flutter, hx DVT on warfarin, HTN, CAD s/p stent, mitral regurg, HLD, polycythemia vera, managed by Dr. Rothman ([facility]), JOVANNI, basal cell ca on face, s/p removal and radiation of malignant mesothelioma, S/p L VATS with decort 5/2022  c/b post op ileus, XRT 9-10/2022, found to have a recurrence of Mesothelioma on PET scan and underwent first chemotherapy session on 5/15/2024, after which he developed fever, diarrhea, nausea an vomiting. Admitted to [facility] and treated empirically for sepsis presumed due to RLL Pneumonia iso of CT CAP showing significant " "collapse of the left lung worsened appearence of known Mesothelioma with concern for tumor thrombus, new chest wall invasion, right lower lobe ill-defined patchy infiltrate, as well as mild uncomplicated diverticulitis. Hospital course complicated with hypotension requiring LR and pressors, hematochezia and anemia requiring blood transfusion, prerenal VIK, cellulitis, and diverticulitis. Patient presenting with continued hematochezia, dark red blood, with GI following,plan for sigmoidoscopy 5/24/23\".  MBSS completed 5/25 showed mild oropharyngeal dysphagia and recommendations made for soft solid/thin liquid diet.            Objective:   Pt reports good tolerance of current diet- easy to chew solids and thin liquids.  Does not wish for diet upgrade to regular solids.  Pt participated in a therapeutic trial feed to assess for diet tolerance and use of safe swallow strategies.  Requires min cues to adhere to safe swallow strategies, particularly single sips.  No overt s/s of aspiration with single sips of thin liquid via straw.  Delayed cough x1  noted following consecutive boluses of thin liquids- ? Aspiration.  Adequate mastication and bolus formation/transit with soft solids.  No oral residue.  No s/s or c/o pharyngeal dysphagia.  No further SLP services warranted at this time.       05/31/24 at 3:49 PM - Meri Hurley, SLP                    "

## 2024-05-31 NOTE — CARE PLAN
Problem: Skin  Goal: Promote/optimize nutrition  Outcome: Progressing  Goal: Promote skin healing  Flowsheets (Taken 5/31/2024 1806)  Promote skin healing:   Assess skin/pad under line(s)/device(s)   Turn/reposition every 2 hours/use positioning/transfer devices   Protective dressings over bony prominences   Ensure correct size (line/device) and apply per  instructions   Rotate device position/do not position patient on device     Problem: Skin  Goal: Promote skin healing  Flowsheets (Taken 5/31/2024 1806)  Promote skin healing:   Assess skin/pad under line(s)/device(s)   Turn/reposition every 2 hours/use positioning/transfer devices   Protective dressings over bony prominences   Ensure correct size (line/device) and apply per  instructions   Rotate device position/do not position patient on device   The patient's goals for the shift include      The clinical goals for the shift include patient will be safe this shift    Patient aox3. He was given pericare with each BM. He had 3 this shift. Was turned upon request for comfort and to prevent pressure sores. O2 Sat were % on 2L. BLE were elevated on pillows.

## 2024-06-01 LAB
ALBUMIN SERPL BCP-MCNC: 2.3 G/DL (ref 3.4–5)
ALP SERPL-CCNC: 62 U/L (ref 33–136)
ALT SERPL W P-5'-P-CCNC: 9 U/L (ref 10–52)
ANION GAP SERPL CALC-SCNC: 17 MMOL/L (ref 10–20)
AST SERPL W P-5'-P-CCNC: 31 U/L (ref 9–39)
BACTERIA BLD CULT: NORMAL
BACTERIA BLD CULT: NORMAL
BASOPHILS # BLD AUTO: 0.01 X10*3/UL (ref 0–0.1)
BASOPHILS NFR BLD AUTO: 0.2 %
BILIRUB SERPL-MCNC: 0.5 MG/DL (ref 0–1.2)
BUN SERPL-MCNC: 44 MG/DL (ref 6–23)
CALCIUM SERPL-MCNC: 7.5 MG/DL (ref 8.6–10.6)
CHLORIDE SERPL-SCNC: 106 MMOL/L (ref 98–107)
CO2 SERPL-SCNC: 23 MMOL/L (ref 21–32)
CREAT SERPL-MCNC: 3.27 MG/DL (ref 0.5–1.3)
EGFRCR SERPLBLD CKD-EPI 2021: 18 ML/MIN/1.73M*2
EOSINOPHIL # BLD AUTO: 0.17 X10*3/UL (ref 0–0.4)
EOSINOPHIL NFR BLD AUTO: 3.4 %
ERYTHROCYTE [DISTWIDTH] IN BLOOD BY AUTOMATED COUNT: 16.1 % (ref 11.5–14.5)
GLUCOSE SERPL-MCNC: 96 MG/DL (ref 74–99)
HCT VFR BLD AUTO: 20.8 % (ref 41–52)
HGB BLD-MCNC: 7 G/DL (ref 13.5–17.5)
IMM GRANULOCYTES # BLD AUTO: 0.06 X10*3/UL (ref 0–0.5)
IMM GRANULOCYTES NFR BLD AUTO: 1.2 % (ref 0–0.9)
LYMPHOCYTES # BLD AUTO: 0.23 X10*3/UL (ref 0.8–3)
LYMPHOCYTES NFR BLD AUTO: 4.7 %
MAGNESIUM SERPL-MCNC: 1.9 MG/DL (ref 1.6–2.4)
MCH RBC QN AUTO: 28.2 PG (ref 26–34)
MCHC RBC AUTO-ENTMCNC: 33.7 G/DL (ref 32–36)
MCV RBC AUTO: 84 FL (ref 80–100)
MONOCYTES # BLD AUTO: 0.17 X10*3/UL (ref 0.05–0.8)
MONOCYTES NFR BLD AUTO: 3.4 %
NEUTROPHILS # BLD AUTO: 4.3 X10*3/UL (ref 1.6–5.5)
NEUTROPHILS NFR BLD AUTO: 87.1 %
NRBC BLD-RTO: 0 /100 WBCS (ref 0–0)
PHOSPHATE SERPL-MCNC: 4.5 MG/DL (ref 2.5–4.9)
PLATELET # BLD AUTO: 75 X10*3/UL (ref 150–450)
POTASSIUM SERPL-SCNC: 4.1 MMOL/L (ref 3.5–5.3)
PROT SERPL-MCNC: 4.7 G/DL (ref 6.4–8.2)
RBC # BLD AUTO: 2.48 X10*6/UL (ref 4.5–5.9)
SODIUM SERPL-SCNC: 142 MMOL/L (ref 136–145)
UFH PPP CHRO-ACNC: 0.4 IU/ML
WBC # BLD AUTO: 4.9 X10*3/UL (ref 4.4–11.3)

## 2024-06-01 PROCEDURE — 2500000002 HC RX 250 W HCPCS SELF ADMINISTERED DRUGS (ALT 637 FOR MEDICARE OP, ALT 636 FOR OP/ED): Mod: MUE

## 2024-06-01 PROCEDURE — 2500000001 HC RX 250 WO HCPCS SELF ADMINISTERED DRUGS (ALT 637 FOR MEDICARE OP)

## 2024-06-01 PROCEDURE — 36415 COLL VENOUS BLD VENIPUNCTURE: CPT

## 2024-06-01 PROCEDURE — 2500000004 HC RX 250 GENERAL PHARMACY W/ HCPCS (ALT 636 FOR OP/ED)

## 2024-06-01 PROCEDURE — 85520 HEPARIN ASSAY: CPT

## 2024-06-01 PROCEDURE — 80053 COMPREHEN METABOLIC PANEL: CPT

## 2024-06-01 PROCEDURE — 83735 ASSAY OF MAGNESIUM: CPT | Performed by: STUDENT IN AN ORGANIZED HEALTH CARE EDUCATION/TRAINING PROGRAM

## 2024-06-01 PROCEDURE — 2500000004 HC RX 250 GENERAL PHARMACY W/ HCPCS (ALT 636 FOR OP/ED): Performed by: STUDENT IN AN ORGANIZED HEALTH CARE EDUCATION/TRAINING PROGRAM

## 2024-06-01 PROCEDURE — 84100 ASSAY OF PHOSPHORUS: CPT | Performed by: STUDENT IN AN ORGANIZED HEALTH CARE EDUCATION/TRAINING PROGRAM

## 2024-06-01 PROCEDURE — 2500000005 HC RX 250 GENERAL PHARMACY W/O HCPCS: Performed by: STUDENT IN AN ORGANIZED HEALTH CARE EDUCATION/TRAINING PROGRAM

## 2024-06-01 PROCEDURE — 84075 ASSAY ALKALINE PHOSPHATASE: CPT

## 2024-06-01 PROCEDURE — 99233 SBSQ HOSP IP/OBS HIGH 50: CPT

## 2024-06-01 PROCEDURE — 85025 COMPLETE CBC W/AUTO DIFF WBC: CPT

## 2024-06-01 PROCEDURE — C9113 INJ PANTOPRAZOLE SODIUM, VIA: HCPCS | Performed by: STUDENT IN AN ORGANIZED HEALTH CARE EDUCATION/TRAINING PROGRAM

## 2024-06-01 PROCEDURE — 1200000003 HC ONCOLOGY  ROOM WITH TELEMETRY DAILY

## 2024-06-01 RX ORDER — METOPROLOL TARTRATE 50 MG/1
50 TABLET ORAL 3 TIMES DAILY
Status: DISCONTINUED | OUTPATIENT
Start: 2024-06-01 | End: 2024-06-02

## 2024-06-01 RX ADMIN — CEPHALEXIN 500 MG: 500 CAPSULE ORAL at 06:07

## 2024-06-01 RX ADMIN — DEXTROMETHORPHAN 30 MG: 30 SUSPENSION, EXTENDED RELEASE ORAL at 21:16

## 2024-06-01 RX ADMIN — PANTOPRAZOLE SODIUM 40 MG: 40 INJECTION, POWDER, FOR SOLUTION INTRAVENOUS at 21:16

## 2024-06-01 RX ADMIN — TAMSULOSIN HYDROCHLORIDE 0.4 MG: 0.4 CAPSULE ORAL at 08:41

## 2024-06-01 RX ADMIN — ROSUVASTATIN CALCIUM 10 MG: 10 TABLET, FILM COATED ORAL at 21:16

## 2024-06-01 RX ADMIN — METOPROLOL TARTRATE 50 MG: 50 TABLET, FILM COATED ORAL at 08:41

## 2024-06-01 RX ADMIN — METOPROLOL TARTRATE 50 MG: 50 TABLET, FILM COATED ORAL at 21:16

## 2024-06-01 RX ADMIN — PANTOPRAZOLE SODIUM 40 MG: 40 INJECTION, POWDER, FOR SOLUTION INTRAVENOUS at 08:41

## 2024-06-01 RX ADMIN — Medication 2 L/MIN: at 08:00

## 2024-06-01 RX ADMIN — DEXTROMETHORPHAN 30 MG: 30 SUSPENSION, EXTENDED RELEASE ORAL at 08:41

## 2024-06-01 RX ADMIN — NYSTATIN 1 APPLICATION: 100000 POWDER TOPICAL at 21:23

## 2024-06-01 RX ADMIN — BENZONATATE 100 MG: 100 CAPSULE ORAL at 00:37

## 2024-06-01 RX ADMIN — CEPHALEXIN 500 MG: 500 CAPSULE ORAL at 15:17

## 2024-06-01 RX ADMIN — CEPHALEXIN 500 MG: 500 CAPSULE ORAL at 21:16

## 2024-06-01 RX ADMIN — ASPIRIN 81 MG: 81 TABLET, COATED ORAL at 21:16

## 2024-06-01 RX ADMIN — METOPROLOL TARTRATE 50 MG: 50 TABLET, FILM COATED ORAL at 15:17

## 2024-06-01 RX ADMIN — OXYCODONE HYDROCHLORIDE AND ACETAMINOPHEN 1000 MG: 500 TABLET ORAL at 08:41

## 2024-06-01 RX ADMIN — Medication 2 L/MIN: at 20:00

## 2024-06-01 RX ADMIN — NYSTATIN 1 APPLICATION: 100000 POWDER TOPICAL at 08:41

## 2024-06-01 RX ADMIN — HEPARIN SODIUM 1650 UNITS/HR: 10000 INJECTION, SOLUTION INTRAVENOUS at 08:40

## 2024-06-01 ASSESSMENT — COGNITIVE AND FUNCTIONAL STATUS - GENERAL
MOVING FROM LYING ON BACK TO SITTING ON SIDE OF FLAT BED WITH BEDRAILS: A LOT
DRESSING REGULAR UPPER BODY CLOTHING: A LOT
MOBILITY SCORE: 11
DRESSING REGULAR LOWER BODY CLOTHING: A LOT
TURNING FROM BACK TO SIDE WHILE IN FLAT BAD: A LOT
WALKING IN HOSPITAL ROOM: A LOT
EATING MEALS: A LITTLE
STANDING UP FROM CHAIR USING ARMS: A LOT
TOILETING: A LOT
DAILY ACTIVITIY SCORE: 13
HELP NEEDED FOR BATHING: A LOT
PERSONAL GROOMING: A LOT
MOVING TO AND FROM BED TO CHAIR: A LOT
CLIMB 3 TO 5 STEPS WITH RAILING: TOTAL

## 2024-06-01 ASSESSMENT — PAIN SCALES - GENERAL
PAINLEVEL_OUTOF10: 0 - NO PAIN
PAINLEVEL_OUTOF10: 0 - NO PAIN

## 2024-06-01 NOTE — SIGNIFICANT EVENT
Rapid Response RN Note    Rapid response RN at bedside for RADAR score 7 due to the recent VS listed below:     Vitals:    05/31/24 1716 05/31/24 2009 06/01/24 0111 06/01/24 0516   BP: 120/69 112/69 111/61 95/61   BP Location: Left arm Right arm     Patient Position: Lying Lying     Pulse: 100 (!) 123 61 108   Resp: 25  20 21   Temp: 36.6 °C (97.9 °F) 37.1 °C (98.8 °F) 37.2 °C (99 °F) 36.6 °C (97.9 °F)   TempSrc: Temporal  Temporal    SpO2: 98% 94% 96% 98%   Weight:       Height:            Reviewed above VS with bedside RN.  VS within patient's current trends.  No interventions by rapid response team indicated at this time.  Staff to page rapid response for any concerns or acute change in condition/VS.

## 2024-06-01 NOTE — NURSING NOTE
Assumed patient at 0700. Patient had 4 Bms this shift. Pericare provided with each BM. Protective barrier cream also applied. MD was paged around dinner time due to patient coughing while wife was trying to feed him. He did had emesis with food content. No new orders received. Patients wife educated on proper way to feed patient to try to avoid aspiration. Stated understanding of instructions.

## 2024-06-01 NOTE — CARE PLAN
The patient's goals for the shift include      The clinical goals for the shift include patient will be safe this shift and maintain skin integrity    Over the shift, patient remained safe and call for assistance when needed. Maintainedskin integrity

## 2024-06-01 NOTE — PROGRESS NOTES
Patient wife would like patient to get some skilled services /therapy prior to coming home. PT/OT to evaluate patient and referrals to be initiated with SNF. Patient will not be on hospice at this time.    Nguyen Arechiga RN

## 2024-06-01 NOTE — PROGRESS NOTES
"Subjective     Overnight events:  Patient was evaluated at the bedside with his wife present. No acute events overnight. Continues to denies any cardiopulmonary symptoms. Family met with Hospice 5/31 for an informational. At this time they wish to proceed with SNF. They have received the list. Encouraged PO intake and discussed adding protein shakes to meals to assist with daily intake.    Vital signs:  Blood pressure 124/71, pulse (!) 130, temperature 36.5 °C (97.7 °F), temperature source Temporal, resp. rate 20, height 1.727 m (5' 7.99\"), weight 121 kg (266 lb 4.8 oz), SpO2 99%.    I/O last 3 completed shifts:  In: 846.1 (7 mL/kg) [P.O.:140; I.V.:706.1 (5.8 mL/kg)]  Out: 1500 (12.4 mL/kg) [Urine:1500 (0.3 mL/kg/hr)]  Weight: 120.8 kg     Physical Exam  Constitutional:       General: He is not in acute distress.  HENT:      Head: Normocephalic and atraumatic.   Eyes:      Extraocular Movements: Extraocular movements intact.   Pulmonary:      Effort: Pulmonary effort is normal.      Breath sounds: No wheezing, rhonchi or rales.      Comments: Decreased breath sounds on left  Abdominal:      General: Bowel sounds are normal. There is distension.      Palpations: Abdomen is soft.      Tenderness: There is no abdominal tenderness.   Musculoskeletal:      Comments: LLE rash resembles petechiae, not warm/painful/edematous when compared to other leg   Neurological:      General: No focal deficit present.      Mental Status: He is oriented to person, place, and time.         Relevant Results        Labs:  Last CBC:  Lab Results   Component Value Date    WBC 4.9 06/01/2024    HGB 7.0 (L) 06/01/2024    HCT 20.8 (L) 06/01/2024    MCV 84 06/01/2024    PLT 75 (L) 06/01/2024       Last RFP:  Lab Results   Component Value Date    GLUCOSE 96 06/01/2024    CALCIUM 7.5 (L) 06/01/2024     06/01/2024    K 4.1 06/01/2024    CO2 23 06/01/2024     06/01/2024    BUN 44 (H) 06/01/2024    CREATININE 3.27 (H) 06/01/2024 "       Last LFTs:  Lab Results   Component Value Date    ALT 9 (L) 06/01/2024    AST 31 06/01/2024    ALKPHOS 62 06/01/2024    BILITOT 0.5 06/01/2024       Last coags:  Lab Results   Component Value Date    INR 1.1 05/26/2024    APTT 28 05/30/2024       Micro/culture data:  No results found for the last 90 days.      Imaging:  ECG 12 Lead  Atrial flutter with variable AV block with premature ventricular or aberrantly conducted complexes  Low voltage QRS  Abnormal ECG  When compared with ECG of 28-MAY-2024 01:40,  Atrial flutter has replaced Atrial fibrillation  Nonspecific T wave abnormality, improved in Inferior leads  Confirmed by Jovanny Loera ([id number]) on 5/31/2024 1:51:33 PM  Flexible Sigmoidoscopy  Table formatting from the original result was not included.  Impression  Dark red blood visualized in the rectum. Blood was cleared without any   active bleeding appreciated nor underlying mucosal changes.   Few small and large, scattered diverticula with no inflammation in the   sigmoid colon; no bleeding was identified. Diverticula were irrigated   without any bleeding or stigmata of recent bleed appreciated.  All observed locations appeared normal, including the sigmoid colon,   rectosigmoid and rectum. No mucosal abnormalities. Normal on retroflexion.  Green bilious stool proximal to the rectum, most notably in the proximal   descending colon and transverse colon, without any blood, blood clots or   hematin.    Findings  Dark red blood visualized in the rectum. Blood was cleared without any   active bleeding appreciated nor underlying mucosal changes.   Few small and large, scattered diverticula with no inflammation in the   sigmoid colon; no bleeding was identified. Diverticula were irrigated   without any bleeding or stigmata of recent bleed appreciated.  All observed locations appeared normal, including the sigmoid colon,   rectosigmoid and rectum. No mucosal abnormalities. Normal on retroflexion.  Green bilious  stool proximal to the rectum, most notably in the proximal   descending colon and transverse colon, without any blood, blood clots or   hematin.    Recommendation   Follow up with primary gastroenterologist    Follow up with inpatient GI consult service, Dr. Valenzuela and Dr. Archibald  Continue on IV PPI BID        Indication  Iron deficiency anemia due to chronic blood loss    Staff  Staff Role   Kaylen Valenzuela MD Proceduralist   Ale Archibald MD    Medications  micafungin (Mycamine) 100 mg in dextrose 5% 100 mL IV 95.24 mg*    *From user-documented volume   fentaNYL PF (Sublimaze) injection  - Omnicell Override Pull Cannot be   calculated*    *Administration dose not documented   midazolam (Versed) injection  - Omnicell Override Pull Cannot be   calculated*    *Administration dose not documented   (Totals for administrations occurring from 1228 to 1404 on 05/24/24)     Preprocedure  A history and physical has been performed, and patient medication   allergies have been reviewed. The patient's tolerance of previous   anesthesia has been reviewed. The risks and benefits of the procedure and   the sedation options and risks were discussed with the patient. All   questions were answered and informed consent obtained.    Details of the Procedure  The patient underwent no sedation. The patient's blood pressure, ECG,   heart rate, level of consciousness, oxygen and respirations were monitored   throughout the procedure. A digital rectal exam was performed. A perianal   exam was performed. The scope was introduced through the anus and advanced   to the transverse colon. Retroflexion was performed in the rectum. The   quality of bowel preparation was evaluated using the [place] Bowel   Preparation Scale with scores of: left colon = 3. Bowel prep was not   adequate. The patient experienced no blood loss. The procedure was not   difficult. The patient tolerated the procedure well. There were no   apparent adverse events.      Events  Procedure Events   Event Event Time   ENDO SCOPE IN TIME 5/24/2024  1:08 PM   ENDO SCOPE OUT TIME 5/24/2024  1:26 PM     Specimens  No specimens collected    Procedure Location  [facility]  [address]  [facility] [address]  [phone]    Referring Provider  Grayson Nichols, APRN-CNP  [address] [address]    Procedure Provider  Ale Archibald MD  Lower extremity venous duplex left  Narrative: Interpreted By:  Nathan Argueta,  and Chaparrita Randolph   STUDY:  [facility] US LOWER EXTREMITY VENOUS DUPLEX LEFT;  5/30/2024 3:58 pm      INDICATION:  Signs/Symptoms:LLE swelling, eval for DVT.      COMPARISON:  None.      ACCESSION NUMBER(S):  [id number]      ORDERING CLINICIAN:  ALEXANDRA AVILA      TECHNIQUE:  Vascular ultrasound of the left lower extremity was performed.  Real-time compression views as well as Gray scale, color Doppler and  spectral Doppler waveform analysis was performed.      FINDINGS:  Evaluation of the visualized portions of the left common femoral  vein, proximal, mid, and distal femoral vein, and popliteal vein were  performed.  Evaluation of the visualized portions of the  posterior  tibial and peroneal veins were also performed. Evaluation of the calf  veins limited due to edema.      Echogenic intraluminal material in the proximal greater saphenous  vein with partial compressibility and color Doppler signal The  evaluated veins demonstrate normal compressibility. There is intact  venous flow demonstrating normal respiratory variability and normal  augmentation of flow with calf compression. Therefore, there is no  ultrasonographic evidence for deep vein thrombosis within the  evaluated veins.      Impression: 1.  No sonographic evidence for deep vein thrombosis within the  evaluated veins of the left lower extremity.  2. Nonocclusive thrombus of the proximal greater saphenous vein, a  superficial  vein. Recommend correlation with concern for superficial  thrombophlebitis.      I personally reviewed the images/study and I agree with the findings  as stated by Agustín Cheatham MD. This study was interpreted at  [facility], [place], [place].      MACRO:  None      Signed by: Nathan Argueta 5/31/2024 5:32 AM  Dictation workstation:   [id number]         Assessment/Plan   Principal Problem:    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Multi)  Active Problems:    Anemia    Vinicius Arriola is a 88 y.o. male with PMHx of PAD, afib/flutter, hx DVT on warfarin, HTN, CAD s/p stent, mitral regurg, HLD, polycythemia vera, managed by Dr. 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 Presented to [facility] 5/14 with abdominal pain, fever, diarrhea, urinary retention c/f sepsis presumed 2/2 PNA; CT CAP showed known LLL collapse 2/2 mesothelioma, new post L chest wall invasion, and c/f L pulmonary trunk tumor thrombus.  5/23 developed shock and acute hypoxic resp failure requiring MICU transfer for pressors and NIV (airvo), thought 2/2 GI bleeding with melena and coffee ground drainage from NGT. Quickly weaned off pressors, s/p flex sig with blood in rectum but no active bleed. Now transferred back to floor for further mgmt.      Updates:   - Remains in Afib overnight rate controlled and asymptomatic;  Increase Metop to 50mg TID   - Add Ensure Protein shakes with meals   - Family in planing with SW regarding SNF placement    #History Afib, HLD, CAD, PAD  #History DVT/PE on Warfarin  :: 5/21 TTE showing:  Left ventricular systolic function is hyperdynamic with a 70-75% estimated ejection fraction, Mildly elevated RVSP, Moderately dilated aortic root   Continues to have episodes of Afib w/ RVR occasional Aflut likely multifactorial  - Metop tartrate at 50mg  "TID  - Holding warfarin, aspirin  - Continue home statin  - Heparin ggt    #Worsening mesothelioma  #Tumor Thrombus  #Mesothelioma with Loculated Pleural Effusions   #Left Hilar Mass, Left Pulmonary Trunk Mass  #History JOVANNI, Pulmonary Embolism  :: primary oncologist Dr. Galvan  :: s/p L VATS w/ decort 5/2022, XRT 9-10/22  :: s/p \"half-dose\" pemfexy on 5/14  :: CT chest 5/18 with significant collapse of left lung, loculated pleural effusions consistent with patient's known mesothelioma.  Also shows ill-defined patchy infiltrate in right lower lobe.  Shows left hilar mass with extension into left pulmonary trunk likely tumor thrombus   - Holding warfarin, continue to monitor   - Dr. Henderson recommends transitioning to Eliquis on discharge, deferring anticoagulation iso thrombocytopenia. Plt stable above 50, resume AC (Heparin ggt; low intensity); plan to transition to Eliquis 2.5mg BID (if tolerating heparin)  - Continuing protocol w/ 1 mg folic acid daily  - Hospice Consult for informational meeting 5/31. Not currently interested in pursuing hospice.    #Tachypnea   #Elevated A-a gradient   #AHRF (Improved)  :: multifactorial, like d/t shunt secondary to malignancy vs deconditioning given prolonged admission vs intermittent Afib vs v/q mismatch   :: RR 23-44   :: 5/22 ABG: pH 7.53, pCO2 31, pO2 109, Lactate 2.2, FiO2 40.  - On 4L nasal cannula previously requiring intermittent AIRVO to assist with work of breathing.   - gradually wean supplemental O2 for SpO2 goal >90%   - continue abx regimen  - CTM     #Coffee grounds via NGT (placed 5/21), improved   #Melena likely d/t GI bleed  :: KUB 5/21, showing moderate distention of stomach with nonspecific predominantly fluid-filled bowel pattern.  :: 5/20-5/21 patient emesis that is watery, dark brown, malodorous   :: did initially present w/ diarrhea could have been chemo induced vs stool ball  :: INR 4.9>6.3>2.4 >1.2  :: s/p Vitamin K 10 mg  :: KUB w/ dilated bowel loops "   - Soft diet, encouraged patient to go slow  - holding ferrous sulfate    - pantoprazole 40 bid   - GI consulted, appreciate recs  -s/p flex sig with no evidence of active bleed              -Will hold off EGD since coffee ground emesis resolved     #Illeus (improved/resolved)  :: KUB 5/26 kub shows distended bowel loops likely ileus, will monitor for improvement with PO diet.   - NG tube clamped; If becoming nauseous or developing vomiting, can unclamp NGT and place to LIWS.  - Tolerating PO and having BM. Will CTM     #Pneumonia, Right lower lobe ill-defined patchy infiltrate (resolved)  :: Resp Cult salivary contamination  :: MRSA nares: negative  :: Strep/Legionella antigens: negative  :: CXR 5/21 showing persistent patchy right infrahilar airspace opacities; improved on serial CXRs  :: 5/22 CT CAP w/o contrast: Interval improvement of the right lower lobe patchy infiltrate  :: s/p Azithro (5/16-5/18)  :: s/p vanc (5/16-5/18) (5/20-5/22) and zosyn (5/16-5/18) (5/20-22)  -eropenem (5/19-5/20) (5/22-25) and micafungin (5/22-25)  -Abx stopped 5/25 given prolonged course with improvement/decline independent of abx, do not suspect active infection currently.       #Fever  #Sepsis most likely d/t pneumonia  :: at OSH met SIRS criteria (febrile, tachypnea)  :: UA 5/18 with small LE, 3+ bacteria, urine cx 5/21 negative  :: Resp Cult salivary contamination  :: MRSA nares: negative  :: Strep/Legionella antigens: negative  :: c diff and enteric stool panel: negative  :: BC 5/16 NGTD, repeat BC 5/20 NGTD  :: CRP 30.46>42.36  :: CXR 5/21 showing persistent patchy right infrahilar airspace opacities    :: s/p Azithro (5/16-5/18)  :: asymmetric warmth, erythema, edema noted in left lower extremity   :: s/p vanc (5/16-5/18) (5/20-5/25) and zosyn (5/16-5/18) (5/20-22)  :: 5/22 CT CAP w/o contrast: Interval improvement of the right lower lobe patchy infiltrate, Interval flattening of the inferior vena cava which may be seen with  hypovolemia, Improved proximal sigmoid diverticulitis.  - repeat blood cultures drawn 5/22, NGTD   - meropenem (5/19-5/20) (5/22-5/25) and micafungin (5/22-5/25); restarted Vanc/Sofy briefly overnight 5/27.     #Lower leg skin changes likely d/t cellulitis   :: previously improved margins of left lower extremity skin changes  - Margins worsened on morning exam 5/30, tender to palpation, slightly warmer than adjacent leg.   - CTM  - Keflex 500 q8 5 day course for cellulitis (5/30-6/3)    #Anemia, improved  :: Likely 2/2 GIB noted this admission  :: Melena and coffee grounds via NGT noted 5/22  :: Hgb initially 10, decreased to 6.9 on 5/23, now stable since 5/24  - S/p 1u pRBC 5/23  - Continue to trend Hgb BID (goal > 8), platelets goal > 20. Platelet goal liberalized as no active bleeding, if hgb decreasing or bleeding clinically then transfuse to goal 50        #VIK on CKD, likely prerenal d/t fluid status   # CKD (baseline Cr 1.3-1.4)  # urine retention s/p Boyd Catheter Placement by Urology in OSH  Estimated Creatinine Clearance: 20.5 mL/min (A) (by C-G formula based on SCr of 3.27 mg/dL (H)).  :: US renal 5/16: Nonobstructing 1 cm left renal calculus, no hydronephrosis  :: Cr 1.64>2.26>3.00 (Baseline Cr 1.3-1.4)  :: Urine electrolytes pre-renal, Net negative, hypernatremia with prolonged NPO all suggest pre-renal hypovolemic VIK. Will encourage PO intake  - will eventually need boyd removal and trial  - strict in/out  - tamsulosin 0.4 mg daily  -minimize nephrotoxic medications as able  - boyd removed 5/23/24 (5/16-5/23)     #Pancytopenia, most likely medication induced from pemfexy, resolved  #Thrombocytopenia  :: c/f TTP  ::   :: s/p pemfexy 5/14  :: d dimer 1521, fibrinogen >1000- no c/f dic  :: no schistocytes on smear  ::   :: filgrastim 480 mcg (5/21-5/22)  - 5/23 ANC 4130   - haptoglobin 400   - Platelets 55>38, ctm. Goal >10  - s/p 1u platelets 5/23 (was bleeding at the time)      #Elevated INR  :: INR 4.9>6.3>2.4 >1.2  :: s/p Vitamin K 10 mg  -CTM    # Hospital-acquired Delirium  :: waxing and waning altered mental status  -Delirium precautions  -hold home trazodone for insomnia      Antibiotics:: none  Dispo: SNF.      F: Replete PRN  E: Replete PRN  N: Regular Diet  DVT Ppx: None iso of low Plt  GI Ppx: Pantoprazole   Access/Lines: PIV x3  Forrest External (5/23- )  Abx: None  O2: 2L NC    Pain regimen: Tylenol   GI Laxative: None     Code status: DNR and No Intubation  Emergency contact:Kirsten Arriola (Spouse) [phone]        Ramesh Conley MD  PGY-1 Internal Medicine

## 2024-06-02 LAB
ABO GROUP (TYPE) IN BLOOD: NORMAL
ALBUMIN SERPL BCP-MCNC: 2.3 G/DL (ref 3.4–5)
ALP SERPL-CCNC: 58 U/L (ref 33–136)
ALT SERPL W P-5'-P-CCNC: 8 U/L (ref 10–52)
ANION GAP SERPL CALC-SCNC: 15 MMOL/L (ref 10–20)
ANTIBODY SCREEN: NORMAL
AST SERPL W P-5'-P-CCNC: 27 U/L (ref 9–39)
BASOPHILS # BLD AUTO: 0.01 X10*3/UL (ref 0–0.1)
BASOPHILS NFR BLD AUTO: 0.2 %
BILIRUB SERPL-MCNC: 0.4 MG/DL (ref 0–1.2)
BUN SERPL-MCNC: 41 MG/DL (ref 6–23)
CALCIUM SERPL-MCNC: 7.7 MG/DL (ref 8.6–10.6)
CHLORIDE SERPL-SCNC: 104 MMOL/L (ref 98–107)
CO2 SERPL-SCNC: 25 MMOL/L (ref 21–32)
CREAT SERPL-MCNC: 3.27 MG/DL (ref 0.5–1.3)
EGFRCR SERPLBLD CKD-EPI 2021: 18 ML/MIN/1.73M*2
EOSINOPHIL # BLD AUTO: 0.22 X10*3/UL (ref 0–0.4)
EOSINOPHIL NFR BLD AUTO: 5.4 %
ERYTHROCYTE [DISTWIDTH] IN BLOOD BY AUTOMATED COUNT: 16 % (ref 11.5–14.5)
ERYTHROCYTE [DISTWIDTH] IN BLOOD BY AUTOMATED COUNT: 16.5 % (ref 11.5–14.5)
FERRITIN SERPL-MCNC: 6680 NG/ML (ref 20–300)
FOLATE SERPL-MCNC: 11 NG/ML
GLUCOSE SERPL-MCNC: 94 MG/DL (ref 74–99)
HCT VFR BLD AUTO: 20.9 % (ref 41–52)
HCT VFR BLD AUTO: 25.6 % (ref 41–52)
HGB BLD-MCNC: 6.8 G/DL (ref 13.5–17.5)
HGB BLD-MCNC: 8.3 G/DL (ref 13.5–17.5)
IMM GRANULOCYTES # BLD AUTO: 0.07 X10*3/UL (ref 0–0.5)
IMM GRANULOCYTES NFR BLD AUTO: 1.7 % (ref 0–0.9)
IRON SATN MFR SERPL: 37 % (ref 25–45)
IRON SERPL-MCNC: 64 UG/DL (ref 35–150)
LDH SERPL L TO P-CCNC: 448 U/L (ref 84–246)
LYMPHOCYTES # BLD AUTO: 0.21 X10*3/UL (ref 0.8–3)
LYMPHOCYTES NFR BLD AUTO: 5.2 %
MAGNESIUM SERPL-MCNC: 1.85 MG/DL (ref 1.6–2.4)
MCH RBC QN AUTO: 29.2 PG (ref 26–34)
MCH RBC QN AUTO: 29.4 PG (ref 26–34)
MCHC RBC AUTO-ENTMCNC: 32.4 G/DL (ref 32–36)
MCHC RBC AUTO-ENTMCNC: 32.5 G/DL (ref 32–36)
MCV RBC AUTO: 90 FL (ref 80–100)
MCV RBC AUTO: 91 FL (ref 80–100)
MONOCYTES # BLD AUTO: 0.21 X10*3/UL (ref 0.05–0.8)
MONOCYTES NFR BLD AUTO: 5.2 %
NEUTROPHILS # BLD AUTO: 3.34 X10*3/UL (ref 1.6–5.5)
NEUTROPHILS NFR BLD AUTO: 82.3 %
NRBC BLD-RTO: 0 /100 WBCS (ref 0–0)
NRBC BLD-RTO: 0 /100 WBCS (ref 0–0)
PHOSPHATE SERPL-MCNC: 4.8 MG/DL (ref 2.5–4.9)
PLATELET # BLD AUTO: 81 X10*3/UL (ref 150–450)
PLATELET # BLD AUTO: 92 X10*3/UL (ref 150–450)
POTASSIUM SERPL-SCNC: 4 MMOL/L (ref 3.5–5.3)
PROT SERPL-MCNC: 5.1 G/DL (ref 6.4–8.2)
RBC # BLD AUTO: 2.31 X10*6/UL (ref 4.5–5.9)
RBC # BLD AUTO: 2.84 X10*6/UL (ref 4.5–5.9)
RH FACTOR (ANTIGEN D): NORMAL
SODIUM SERPL-SCNC: 140 MMOL/L (ref 136–145)
TIBC SERPL-MCNC: 175 UG/DL (ref 240–445)
UFH PPP CHRO-ACNC: 0.4 IU/ML
UIBC SERPL-MCNC: 111 UG/DL (ref 110–370)
VIT B12 SERPL-MCNC: 1287 PG/ML (ref 211–911)
WBC # BLD AUTO: 4.1 X10*3/UL (ref 4.4–11.3)
WBC # BLD AUTO: 4.9 X10*3/UL (ref 4.4–11.3)

## 2024-06-02 PROCEDURE — 85025 COMPLETE CBC W/AUTO DIFF WBC: CPT

## 2024-06-02 PROCEDURE — 36430 TRANSFUSION BLD/BLD COMPNT: CPT

## 2024-06-02 PROCEDURE — 83615 LACTATE (LD) (LDH) ENZYME: CPT

## 2024-06-02 PROCEDURE — 2500000001 HC RX 250 WO HCPCS SELF ADMINISTERED DRUGS (ALT 637 FOR MEDICARE OP)

## 2024-06-02 PROCEDURE — 1170000001 HC PRIVATE ONCOLOGY ROOM DAILY

## 2024-06-02 PROCEDURE — 83550 IRON BINDING TEST: CPT | Mod: 91

## 2024-06-02 PROCEDURE — 83540 ASSAY OF IRON: CPT

## 2024-06-02 PROCEDURE — 36415 COLL VENOUS BLD VENIPUNCTURE: CPT

## 2024-06-02 PROCEDURE — 2500000005 HC RX 250 GENERAL PHARMACY W/O HCPCS: Performed by: STUDENT IN AN ORGANIZED HEALTH CARE EDUCATION/TRAINING PROGRAM

## 2024-06-02 PROCEDURE — C9113 INJ PANTOPRAZOLE SODIUM, VIA: HCPCS | Performed by: STUDENT IN AN ORGANIZED HEALTH CARE EDUCATION/TRAINING PROGRAM

## 2024-06-02 PROCEDURE — 82270 OCCULT BLOOD FECES: CPT

## 2024-06-02 PROCEDURE — 85520 HEPARIN ASSAY: CPT

## 2024-06-02 PROCEDURE — 99233 SBSQ HOSP IP/OBS HIGH 50: CPT

## 2024-06-02 PROCEDURE — 83735 ASSAY OF MAGNESIUM: CPT | Performed by: STUDENT IN AN ORGANIZED HEALTH CARE EDUCATION/TRAINING PROGRAM

## 2024-06-02 PROCEDURE — 82728 ASSAY OF FERRITIN: CPT

## 2024-06-02 PROCEDURE — 84100 ASSAY OF PHOSPHORUS: CPT | Performed by: STUDENT IN AN ORGANIZED HEALTH CARE EDUCATION/TRAINING PROGRAM

## 2024-06-02 PROCEDURE — 2500000004 HC RX 250 GENERAL PHARMACY W/ HCPCS (ALT 636 FOR OP/ED)

## 2024-06-02 PROCEDURE — 2500000001 HC RX 250 WO HCPCS SELF ADMINISTERED DRUGS (ALT 637 FOR MEDICARE OP): Performed by: STUDENT IN AN ORGANIZED HEALTH CARE EDUCATION/TRAINING PROGRAM

## 2024-06-02 PROCEDURE — 86923 COMPATIBILITY TEST ELECTRIC: CPT

## 2024-06-02 PROCEDURE — 2500000004 HC RX 250 GENERAL PHARMACY W/ HCPCS (ALT 636 FOR OP/ED): Performed by: STUDENT IN AN ORGANIZED HEALTH CARE EDUCATION/TRAINING PROGRAM

## 2024-06-02 PROCEDURE — 2500000002 HC RX 250 W HCPCS SELF ADMINISTERED DRUGS (ALT 637 FOR MEDICARE OP, ALT 636 FOR OP/ED): Mod: MUE

## 2024-06-02 PROCEDURE — 82746 ASSAY OF FOLIC ACID SERUM: CPT

## 2024-06-02 PROCEDURE — 85027 COMPLETE CBC AUTOMATED: CPT

## 2024-06-02 PROCEDURE — 80053 COMPREHEN METABOLIC PANEL: CPT

## 2024-06-02 PROCEDURE — 82607 VITAMIN B-12: CPT

## 2024-06-02 PROCEDURE — 86900 BLOOD TYPING SEROLOGIC ABO: CPT | Mod: 91

## 2024-06-02 PROCEDURE — P9040 RBC LEUKOREDUCED IRRADIATED: HCPCS

## 2024-06-02 RX ORDER — METOPROLOL SUCCINATE 100 MG/1
100 TABLET, EXTENDED RELEASE ORAL ONCE
Status: DISCONTINUED | OUTPATIENT
Start: 2024-06-02 | End: 2024-06-02

## 2024-06-02 RX ORDER — METOPROLOL SUCCINATE 50 MG/1
50 TABLET, EXTENDED RELEASE ORAL ONCE
Status: COMPLETED | OUTPATIENT
Start: 2024-06-02 | End: 2024-06-02

## 2024-06-02 RX ADMIN — ERGOCALCIFEROL 1250 MCG: 1.25 CAPSULE ORAL at 09:53

## 2024-06-02 RX ADMIN — PANTOPRAZOLE SODIUM 40 MG: 40 INJECTION, POWDER, FOR SOLUTION INTRAVENOUS at 09:53

## 2024-06-02 RX ADMIN — Medication 5 MG: at 00:06

## 2024-06-02 RX ADMIN — NYSTATIN 1 APPLICATION: 100000 POWDER TOPICAL at 20:56

## 2024-06-02 RX ADMIN — TAMSULOSIN HYDROCHLORIDE 0.4 MG: 0.4 CAPSULE ORAL at 09:53

## 2024-06-02 RX ADMIN — METOPROLOL SUCCINATE 50 MG: 50 TABLET, EXTENDED RELEASE ORAL at 18:33

## 2024-06-02 RX ADMIN — ASPIRIN 81 MG: 81 TABLET, COATED ORAL at 20:51

## 2024-06-02 RX ADMIN — OXYCODONE HYDROCHLORIDE AND ACETAMINOPHEN 1000 MG: 500 TABLET ORAL at 09:53

## 2024-06-02 RX ADMIN — Medication 5 MG: at 23:14

## 2024-06-02 RX ADMIN — ROSUVASTATIN CALCIUM 10 MG: 10 TABLET, FILM COATED ORAL at 20:51

## 2024-06-02 RX ADMIN — Medication 2 L/MIN: at 08:00

## 2024-06-02 RX ADMIN — HEPARIN SODIUM 1650 UNITS/HR: 10000 INJECTION, SOLUTION INTRAVENOUS at 13:16

## 2024-06-02 RX ADMIN — METOPROLOL TARTRATE 50 MG: 50 TABLET, FILM COATED ORAL at 09:53

## 2024-06-02 RX ADMIN — HEPARIN SODIUM 1650 UNITS/HR: 10000 INJECTION, SOLUTION INTRAVENOUS at 00:02

## 2024-06-02 RX ADMIN — CEPHALEXIN 500 MG: 500 CAPSULE ORAL at 13:16

## 2024-06-02 RX ADMIN — CEPHALEXIN 500 MG: 500 CAPSULE ORAL at 05:46

## 2024-06-02 RX ADMIN — Medication 2 L/MIN: at 20:00

## 2024-06-02 RX ADMIN — PANTOPRAZOLE SODIUM 40 MG: 40 INJECTION, POWDER, FOR SOLUTION INTRAVENOUS at 20:51

## 2024-06-02 RX ADMIN — DEXTROMETHORPHAN 30 MG: 30 SUSPENSION, EXTENDED RELEASE ORAL at 21:00

## 2024-06-02 RX ADMIN — DEXTROMETHORPHAN 30 MG: 30 SUSPENSION, EXTENDED RELEASE ORAL at 09:54

## 2024-06-02 RX ADMIN — CEPHALEXIN 500 MG: 500 CAPSULE ORAL at 22:05

## 2024-06-02 RX ADMIN — NYSTATIN 1 APPLICATION: 100000 POWDER TOPICAL at 09:59

## 2024-06-02 ASSESSMENT — PAIN SCALES - GENERAL: PAINLEVEL_OUTOF10: 0 - NO PAIN

## 2024-06-02 ASSESSMENT — COGNITIVE AND FUNCTIONAL STATUS - GENERAL
TOILETING: A LOT
PERSONAL GROOMING: A LOT
MOVING TO AND FROM BED TO CHAIR: A LOT
CLIMB 3 TO 5 STEPS WITH RAILING: A LOT
MOVING FROM LYING ON BACK TO SITTING ON SIDE OF FLAT BED WITH BEDRAILS: A LOT
DRESSING REGULAR LOWER BODY CLOTHING: A LOT
STANDING UP FROM CHAIR USING ARMS: A LOT
WALKING IN HOSPITAL ROOM: A LOT
TURNING FROM BACK TO SIDE WHILE IN FLAT BAD: A LOT
DRESSING REGULAR UPPER BODY CLOTHING: A LOT
MOBILITY SCORE: 12
HELP NEEDED FOR BATHING: A LOT
DAILY ACTIVITIY SCORE: 13
EATING MEALS: A LITTLE

## 2024-06-02 NOTE — CARE PLAN
Problem: Fall/Injury  Goal: Pace activities to prevent fatigue by end of the shift  Outcome: Progressing     Problem: Skin  Goal: Promote/optimize nutrition  Flowsheets (Taken 6/2/2024 2900)  Promote/optimize nutrition:   Assist with feeding   Monitor/record intake including meals   Consume > 50% meals/supplements   Offer water/supplements/favorite foods   Discuss with provider if NPO > 2 days   Reassess MST if dietician not consulted   The patient's goals for the shift include      The clinical goals for the shift include patient will be safe this shift    Patient was turned every two hours and PRN for vesta care after having Bms. Infused 1 unit of PRBC. Changed external male catheter.

## 2024-06-02 NOTE — PROGRESS NOTES
"Subjective     Overnight events:  No acute events overnight. Pt evaluated with his wife present. Pt continues to deny cardiopulmonary sxs. Encouraged to drink protein shakes with meals. Largely in NSR on tele overnight with less frequents bouts of afib, all of which asymptomatic. No evidence of bleeding on exam or per patient and wife.  Discussed the plan with him and his wife and answered any questions they had.     Vital signs:  Blood pressure 106/60, pulse 100, temperature 36.6 °C (97.9 °F), temperature source Temporal, resp. rate 18, height 1.727 m (5' 7.99\"), weight 121 kg (266 lb 4.8 oz), SpO2 93%.    I/O last 3 completed shifts:  In: 1517.3 (12.6 mL/kg) [P.O.:380; I.V.:1137.3 (9.4 mL/kg)]  Out: 1600 (13.2 mL/kg) [Urine:1600 (0.4 mL/kg/hr)]  Weight: 120.8 kg     Physical Exam  Constitutional:       General: He is not in acute distress.  Eyes:      Extraocular Movements: Extraocular movements intact.   Pulmonary:      Effort: Pulmonary effort is normal.      Breath sounds: No wheezing, rhonchi or rales.      Comments: Decreased breath sounds on left  Abdominal:      General: Bowel sounds are normal. There is distension.      Palpations: Abdomen is soft.      Tenderness: There is no abdominal tenderness.   Musculoskeletal:      Comments: Mild bilat LE edema 1+   Neurological:      General: No focal deficit present.      Mental Status: He is oriented to person, place, and time.         Relevant Results        Labs:  Last CBC:  Lab Results   Component Value Date    WBC 4.1 (L) 06/02/2024    HGB 6.8 (L) 06/02/2024    HCT 20.9 (L) 06/02/2024    MCV 91 06/02/2024    PLT 81 (L) 06/02/2024       Last RFP:  Lab Results   Component Value Date    GLUCOSE 94 06/02/2024    CALCIUM 7.7 (L) 06/02/2024     06/02/2024    K 4.0 06/02/2024    CO2 25 06/02/2024     06/02/2024    BUN 41 (H) 06/02/2024    CREATININE 3.27 (H) 06/02/2024       Last LFTs:  Lab Results   Component Value Date    ALT 8 (L) 06/02/2024    AST 27 " 06/02/2024    ALKPHOS 58 06/02/2024    BILITOT 0.4 06/02/2024       Last coags:  Lab Results   Component Value Date    INR 1.1 05/26/2024    APTT 28 05/30/2024       Micro/culture data:  No results found for the last 90 days.      Imaging:  ECG 12 Lead  Atrial flutter with variable AV block with premature ventricular or aberrantly conducted complexes  Low voltage QRS  Abnormal ECG  When compared with ECG of 28-MAY-2024 01:40,  Atrial flutter has replaced Atrial fibrillation  Nonspecific T wave abnormality, improved in Inferior leads  Confirmed by Jovanny Loera ([id number]) on 5/31/2024 1:51:33 PM  Flexible Sigmoidoscopy  Table formatting from the original result was not included.  Impression  Dark red blood visualized in the rectum. Blood was cleared without any   active bleeding appreciated nor underlying mucosal changes.   Few small and large, scattered diverticula with no inflammation in the   sigmoid colon; no bleeding was identified. Diverticula were irrigated   without any bleeding or stigmata of recent bleed appreciated.  All observed locations appeared normal, including the sigmoid colon,   rectosigmoid and rectum. No mucosal abnormalities. Normal on retroflexion.  Green bilious stool proximal to the rectum, most notably in the proximal   descending colon and transverse colon, without any blood, blood clots or   hematin.    Findings  Dark red blood visualized in the rectum. Blood was cleared without any   active bleeding appreciated nor underlying mucosal changes.   Few small and large, scattered diverticula with no inflammation in the   sigmoid colon; no bleeding was identified. Diverticula were irrigated   without any bleeding or stigmata of recent bleed appreciated.  All observed locations appeared normal, including the sigmoid colon,   rectosigmoid and rectum. No mucosal abnormalities. Normal on retroflexion.  Green bilious stool proximal to the rectum, most notably in the proximal   descending colon and  transverse colon, without any blood, blood clots or   hematin.    Recommendation   Follow up with primary gastroenterologist    Follow up with inpatient GI consult service, Dr. Valenzuela and Dr. Archibald  Continue on IV PPI BID        Indication  Iron deficiency anemia due to chronic blood loss    Staff  Staff Role   Kaylen Valenzuela MD Proceduralist   Ale Archibald MD    Medications  micafungin (Mycamine) 100 mg in dextrose 5% 100 mL IV 95.24 mg*    *From user-documented volume   fentaNYL PF (Sublimaze) injection  - Omnicell Override Pull Cannot be   calculated*    *Administration dose not documented   midazolam (Versed) injection  - Omnicell Override Pull Cannot be   calculated*    *Administration dose not documented   (Totals for administrations occurring from 1228 to 1404 on 05/24/24)     Preprocedure  A history and physical has been performed, and patient medication   allergies have been reviewed. The patient's tolerance of previous   anesthesia has been reviewed. The risks and benefits of the procedure and   the sedation options and risks were discussed with the patient. All   questions were answered and informed consent obtained.    Details of the Procedure  The patient underwent no sedation. The patient's blood pressure, ECG,   heart rate, level of consciousness, oxygen and respirations were monitored   throughout the procedure. A digital rectal exam was performed. A perianal   exam was performed. The scope was introduced through the anus and advanced   to the transverse colon. Retroflexion was performed in the rectum. The   quality of bowel preparation was evaluated using the [place] Bowel   Preparation Scale with scores of: left colon = 3. Bowel prep was not   adequate. The patient experienced no blood loss. The procedure was not   difficult. The patient tolerated the procedure well. There were no   apparent adverse events.     Events  Procedure Events   Event Event Time   ENDO SCOPE IN TIME 5/24/2024   1:08 PM   ENDO SCOPE OUT TIME 5/24/2024  1:26 PM     Specimens  No specimens collected    Procedure Location  [facility]  [address]  [facility] [id number] [phone]    Referring Provider  Grayson Nichols, APRN-CNP  [address] [address]    Procedure Provider  Ale Archibald MD  Lower extremity venous duplex left  Narrative: Interpreted By:  Nathan Argueta,  and Chaparrita Randolph   STUDY:  [facility] US LOWER EXTREMITY VENOUS DUPLEX LEFT;  5/30/2024 3:58 pm      INDICATION:  Signs/Symptoms:LLE swelling, eval for DVT.      COMPARISON:  None.      ACCESSION NUMBER(S):  [id number]      ORDERING CLINICIAN:  ALEXANDRA AVILA      TECHNIQUE:  Vascular ultrasound of the left lower extremity was performed.  Real-time compression views as well as Gray scale, color Doppler and  spectral Doppler waveform analysis was performed.      FINDINGS:  Evaluation of the visualized portions of the left common femoral  vein, proximal, mid, and distal femoral vein, and popliteal vein were  performed.  Evaluation of the visualized portions of the  posterior  tibial and peroneal veins were also performed. Evaluation of the calf  veins limited due to edema.      Echogenic intraluminal material in the proximal greater saphenous  vein with partial compressibility and color Doppler signal The  evaluated veins demonstrate normal compressibility. There is intact  venous flow demonstrating normal respiratory variability and normal  augmentation of flow with calf compression. Therefore, there is no  ultrasonographic evidence for deep vein thrombosis within the  evaluated veins.      Impression: 1.  No sonographic evidence for deep vein thrombosis within the  evaluated veins of the left lower extremity.  2. Nonocclusive thrombus of the proximal greater saphenous vein, a  superficial vein. Recommend correlation with concern for superficial  thrombophlebitis.       I personally reviewed the images/study and I agree with the findings  as stated by Agustín Cheatham MD. This study was interpreted at  [facility], [place], [place].      MACRO:  None      Signed by: Nathan Argueta 5/31/2024 5:32 AM  Dictation workstation:   [id number]         Assessment/Plan   Principal Problem:    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Multi)  Active Problems:    Anemia    Vinicius Arriola is a 88 y.o. male with PMHx of PAD, afib/flutter, hx DVT on warfarin, HTN, CAD s/p stent, mitral regurg, HLD, polycythemia vera, managed by Dr. 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 Presented to [facility] 5/14 with abdominal pain, fever, diarrhea, urinary retention c/f sepsis presumed 2/2 PNA; CT CAP showed known LLL collapse 2/2 mesothelioma, new post L chest wall invasion, and c/f L pulmonary trunk tumor thrombus.  5/23 developed shock and acute hypoxic resp failure requiring MICU transfer for pressors and NIV (airvo), thought 2/2 GI bleeding with melena and coffee ground drainage from NGT. Quickly weaned off pressors, s/p flex sig with blood in rectum but no active bleed. Now transferred back to floor for further mgmt.      Updates:   - Metop tartrate 50 TID to 150 Succinate daily  - Pending PT/OT evaluation needed for SNF planning  - 1uPRBC for Hgb <7; repeat anemia workup (Fe/TIBC, B12, B9, Hapto)  - FOBT  - May transition to eliquis tmrw morning    #History Afib, HLD, CAD, PAD  #History DVT/PE on Warfarin  :: 5/21 TTE showing:  Left ventricular systolic function is hyperdynamic with a 70-75% estimated ejection fraction, Mildly elevated RVSP, Moderately dilated aortic root   Continues to have episodes of Afib w/ RVR occasional Aflut likely multifactorial  - Metop tartrate at 50mg TID > Metop Succinate 150 mg qd  - Holding  "warfarin, aspirin  - Continue home statin  - Heparin ggt    #Worsening mesothelioma  #Tumor Thrombus  #Mesothelioma with Loculated Pleural Effusions   #Left Hilar Mass, Left Pulmonary Trunk Mass  #History JOVANNI, Pulmonary Embolism  :: primary oncologist Dr. Galvan  :: s/p L VATS w/ decort 5/2022, XRT 9-10/22  :: s/p \"half-dose\" pemfexy on 5/14  :: CT chest 5/18 with significant collapse of left lung, loculated pleural effusions consistent with patient's known mesothelioma.  Also shows ill-defined patchy infiltrate in right lower lobe.  Shows left hilar mass with extension into left pulmonary trunk likely tumor thrombus   - Holding warfarin, continue to monitor   - Dr. Henderson recommends transitioning to Eliquis on discharge, deferring anticoagulation iso thrombocytopenia. Plt stable above 50, resume AC (Heparin ggt; low intensity); plan to transition to Eliquis 2.5mg BID (if tolerating heparin)  - Continuing protocol w/ 1 mg folic acid daily  - Hospice Consult for informational meeting 5/31. Not currently interested in pursuing hospice.    #Tachypnea   #Elevated A-a gradient   #AHRF (Improved)  :: multifactorial, like d/t shunt secondary to malignancy vs deconditioning given prolonged admission vs intermittent Afib vs v/q mismatch   :: RR 23-44   :: 5/22 ABG: pH 7.53, pCO2 31, pO2 109, Lactate 2.2, FiO2 40.  - On 4L nasal cannula previously requiring intermittent AIRVO to assist with work of breathing.   - gradually wean supplemental O2 for SpO2 goal >90%   - continue abx regimen  - CTM     #Coffee grounds via NGT (placed 5/21), improved   #Melena likely d/t GI bleed  :: KUB 5/21, showing moderate distention of stomach with nonspecific predominantly fluid-filled bowel pattern.  :: 5/20-5/21 patient emesis that is watery, dark brown, malodorous   :: did initially present w/ diarrhea could have been chemo induced vs stool ball  :: INR 4.9>6.3>2.4 >1.2  :: s/p Vitamin K 10 mg  :: KUB w/ dilated bowel loops   - Soft diet, " encouraged patient to go slow  - holding ferrous sulfate    - pantoprazole 40 bid   - GI consulted, appreciate recs  -s/p flex sig with no evidence of active bleed              -Will hold off EGD since coffee ground emesis resolved     #Illeus (improved/resolved)  :: KUB 5/26 kub shows distended bowel loops likely ileus, will monitor for improvement with PO diet.   - NG tube clamped; If becoming nauseous or developing vomiting, can unclamp NGT and place to LIWS.  - Tolerating PO and having BM. Will CTM     #Pneumonia, Right lower lobe ill-defined patchy infiltrate (resolved)  :: Resp Cult salivary contamination  :: MRSA nares: negative  :: Strep/Legionella antigens: negative  :: CXR 5/21 showing persistent patchy right infrahilar airspace opacities; improved on serial CXRs  :: 5/22 CT CAP w/o contrast: Interval improvement of the right lower lobe patchy infiltrate  :: s/p Azithro (5/16-5/18)  :: s/p vanc (5/16-5/18) (5/20-5/22) and zosyn (5/16-5/18) (5/20-22)  -eropenem (5/19-5/20) (5/22-25) and micafungin (5/22-25)  -Abx stopped 5/25 given prolonged course with improvement/decline independent of abx, do not suspect active infection currently.       #Fever  #Sepsis most likely d/t pneumonia  :: at OSH met SIRS criteria (febrile, tachypnea)  :: UA 5/18 with small LE, 3+ bacteria, urine cx 5/21 negative  :: Resp Cult salivary contamination  :: MRSA nares: negative  :: Strep/Legionella antigens: negative  :: c diff and enteric stool panel: negative  :: BC 5/16 NGTD, repeat BC 5/20 NGTD  :: CRP 30.46>42.36  :: CXR 5/21 showing persistent patchy right infrahilar airspace opacities    :: s/p Azithro (5/16-5/18)  :: asymmetric warmth, erythema, edema noted in left lower extremity   :: s/p vanc (5/16-5/18) (5/20-5/25) and zosyn (5/16-5/18) (5/20-22)  :: 5/22 CT CAP w/o contrast: Interval improvement of the right lower lobe patchy infiltrate, Interval flattening of the inferior vena cava which may be seen with hypovolemia,  Improved proximal sigmoid diverticulitis.  - repeat blood cultures drawn 5/22, NGTD   - meropenem (5/19-5/20) (5/22-5/25) and micafungin (5/22-5/25); restarted Vanc/Sofy briefly overnight 5/27.     #Lower leg skin changes likely d/t cellulitis   :: previously improved margins of left lower extremity skin changes  - Margins worsened on morning exam 5/30, tender to palpation, slightly warmer than adjacent leg.   - CTM  - Keflex 500 q8 5 day course for cellulitis (5/30-6/3)    #Anemia  :: Likely 2/2 GIB noted this admission  :: Melena and coffee grounds via NGT noted 5/22  :: Hgb initially 10, decreased to 6.9 on 5/23 (1u pRBC) > slow decrease again to 6.8 on 6/2 (1u pRBC)  - Continue to trend Hgb, platelets goal > 20. Platelet goal liberalized as no active bleeding, if hgb decreasing or bleeding clinically then transfuse to goal 50  - Anemia workup: Fe/TIBC, B12, B9, Hapto        #VIK on CKD, likely prerenal d/t fluid status   # CKD (baseline Cr 1.3-1.4)  # urine retention s/p Boyd Catheter Placement by Urology in OSH  Estimated Creatinine Clearance: 20.5 mL/min (A) (by C-G formula based on SCr of 3.27 mg/dL (H)).  :: US renal 5/16: Nonobstructing 1 cm left renal calculus, no hydronephrosis  :: Cr 1.64>2.26>3.00 (Baseline Cr 1.3-1.4)  :: Urine electrolytes pre-renal, Net negative, hypernatremia with prolonged NPO all suggest pre-renal hypovolemic VIK. Will encourage PO intake  - will eventually need boyd removal and trial  - strict in/out  - tamsulosin 0.4 mg daily  -minimize nephrotoxic medications as able  - boyd removed 5/23/24 (5/16-5/23)     #Pancytopenia, most likely medication induced from pemfexy, resolved  #Thrombocytopenia  :: c/f TTP  ::   :: s/p pemfexy 5/14  :: d dimer 1521, fibrinogen >1000- no c/f dic  :: no schistocytes on smear  ::   :: filgrastim 480 mcg (5/21-5/22)  - 5/23 ANC 4130   - haptoglobin 400   - Platelets 55>38, ctm. Goal >10  - s/p 1u platelets 5/23 (was bleeding at the  time)     #Elevated INR  :: INR 4.9>6.3>2.4 >1.2  :: s/p Vitamin K 10 mg  -CTM    # Hospital-acquired Delirium  :: waxing and waning altered mental status  -Delirium precautions  -hold home trazodone for insomnia      Antibiotics:: none  Dispo: SNF.      F: Replete PRN  E: Replete PRN  N: Regular Diet  DVT Ppx: None iso of low Plt  GI Ppx: Pantoprazole   Access/Lines: PIV x3  Forrest External (5/23- )  Abx: None  O2: 2L NC    Pain regimen: Tylenol   GI Laxative: None     Code status: DNR and No Intubation  Emergency contact:Kirsten Arriola (Spouse) [phone]        Ramesh Conley MD  PGY-1 Internal Medicine

## 2024-06-02 NOTE — SIGNIFICANT EVENT
06/02/24 0840   Onset Documentation   Rapid Response Initiated By Radar auto page   Location/Room [facility]  ([facility] [id number])   Pager Time [phone]   Arrival Time 0840   Event End Time 0844   Level II Called No   Primary Reason for Call Radar auto page     Rapid Response Note    Radar auto-page received for a radar score of 6 with the following vital signs: 36.6, 100, 18, 106/60, 93%.  Vital signs were confirmed and reviewed with primary RN.  He is at his current baseline.  There are no indications for interventions by Rapid Response at this time.  RN to contact Rapid Response with any future concerns or signs of clinical decompensation.

## 2024-06-03 LAB
ALBUMIN SERPL BCP-MCNC: 2.3 G/DL (ref 3.4–5)
ALP SERPL-CCNC: 63 U/L (ref 33–136)
ALT SERPL W P-5'-P-CCNC: 9 U/L (ref 10–52)
ANION GAP SERPL CALC-SCNC: 14 MMOL/L (ref 10–20)
AST SERPL W P-5'-P-CCNC: 27 U/L (ref 9–39)
BASOPHILS # BLD AUTO: 0.01 X10*3/UL (ref 0–0.1)
BASOPHILS NFR BLD AUTO: 0.3 %
BILIRUB SERPL-MCNC: 0.4 MG/DL (ref 0–1.2)
BLOOD EXPIRATION DATE: NORMAL
BUN SERPL-MCNC: 40 MG/DL (ref 6–23)
C DIF TOX TCDA+TCDB STL QL NAA+PROBE: NOT DETECTED
CALCIUM SERPL-MCNC: 7.7 MG/DL (ref 8.6–10.6)
CHLORIDE SERPL-SCNC: 105 MMOL/L (ref 98–107)
CO2 SERPL-SCNC: 24 MMOL/L (ref 21–32)
CREAT SERPL-MCNC: 3.37 MG/DL (ref 0.5–1.3)
DISPENSE STATUS: NORMAL
EGFRCR SERPLBLD CKD-EPI 2021: 17 ML/MIN/1.73M*2
EOSINOPHIL # BLD AUTO: 0.28 X10*3/UL (ref 0–0.4)
EOSINOPHIL NFR BLD AUTO: 7.6 %
ERYTHROCYTE [DISTWIDTH] IN BLOOD BY AUTOMATED COUNT: 16 % (ref 11.5–14.5)
ERYTHROCYTE [DISTWIDTH] IN BLOOD BY AUTOMATED COUNT: 16.1 % (ref 11.5–14.5)
GLUCOSE SERPL-MCNC: 91 MG/DL (ref 74–99)
HCT VFR BLD AUTO: 21.3 % (ref 41–52)
HCT VFR BLD AUTO: 22.8 % (ref 41–52)
HEMOCCULT SP1 STL QL: NEGATIVE
HGB BLD-MCNC: 7 G/DL (ref 13.5–17.5)
HGB BLD-MCNC: 7.2 G/DL (ref 13.5–17.5)
IMM GRANULOCYTES # BLD AUTO: 0.05 X10*3/UL (ref 0–0.5)
IMM GRANULOCYTES NFR BLD AUTO: 1.4 % (ref 0–0.9)
LYMPHOCYTES # BLD AUTO: 0.27 X10*3/UL (ref 0.8–3)
LYMPHOCYTES NFR BLD AUTO: 7.3 %
MAGNESIUM SERPL-MCNC: 1.8 MG/DL (ref 1.6–2.4)
MCH RBC QN AUTO: 29 PG (ref 26–34)
MCH RBC QN AUTO: 29.3 PG (ref 26–34)
MCHC RBC AUTO-ENTMCNC: 31.6 G/DL (ref 32–36)
MCHC RBC AUTO-ENTMCNC: 32.9 G/DL (ref 32–36)
MCV RBC AUTO: 88 FL (ref 80–100)
MCV RBC AUTO: 93 FL (ref 80–100)
MONOCYTES # BLD AUTO: 0.16 X10*3/UL (ref 0.05–0.8)
MONOCYTES NFR BLD AUTO: 4.3 %
NEUTROPHILS # BLD AUTO: 2.93 X10*3/UL (ref 1.6–5.5)
NEUTROPHILS NFR BLD AUTO: 79.1 %
NRBC BLD-RTO: 0 /100 WBCS (ref 0–0)
NRBC BLD-RTO: 0 /100 WBCS (ref 0–0)
PHOSPHATE SERPL-MCNC: 4.7 MG/DL (ref 2.5–4.9)
PLATELET # BLD AUTO: 95 X10*3/UL (ref 150–450)
PLATELET # BLD AUTO: 97 X10*3/UL (ref 150–450)
POTASSIUM SERPL-SCNC: 4 MMOL/L (ref 3.5–5.3)
PRODUCT BLOOD TYPE: 9500
PRODUCT CODE: NORMAL
PROT SERPL-MCNC: 5.4 G/DL (ref 6.4–8.2)
RBC # BLD AUTO: 2.41 X10*6/UL (ref 4.5–5.9)
RBC # BLD AUTO: 2.46 X10*6/UL (ref 4.5–5.9)
SODIUM SERPL-SCNC: 139 MMOL/L (ref 136–145)
UFH PPP CHRO-ACNC: 0.4 IU/ML
UNIT ABO: NORMAL
UNIT NUMBER: NORMAL
UNIT RH: NORMAL
UNIT VOLUME: 281
WBC # BLD AUTO: 3.5 X10*3/UL (ref 4.4–11.3)
WBC # BLD AUTO: 3.7 X10*3/UL (ref 4.4–11.3)
XM INTEP: NORMAL

## 2024-06-03 PROCEDURE — C9113 INJ PANTOPRAZOLE SODIUM, VIA: HCPCS | Performed by: STUDENT IN AN ORGANIZED HEALTH CARE EDUCATION/TRAINING PROGRAM

## 2024-06-03 PROCEDURE — 2500000005 HC RX 250 GENERAL PHARMACY W/O HCPCS: Performed by: STUDENT IN AN ORGANIZED HEALTH CARE EDUCATION/TRAINING PROGRAM

## 2024-06-03 PROCEDURE — P9047 ALBUMIN (HUMAN), 25%, 50ML: HCPCS | Mod: JZ

## 2024-06-03 PROCEDURE — 80053 COMPREHEN METABOLIC PANEL: CPT

## 2024-06-03 PROCEDURE — 87493 C DIFF AMPLIFIED PROBE: CPT

## 2024-06-03 PROCEDURE — 97110 THERAPEUTIC EXERCISES: CPT | Mod: GP | Performed by: PHYSICAL THERAPIST

## 2024-06-03 PROCEDURE — 2500000001 HC RX 250 WO HCPCS SELF ADMINISTERED DRUGS (ALT 637 FOR MEDICARE OP)

## 2024-06-03 PROCEDURE — 2500000004 HC RX 250 GENERAL PHARMACY W/ HCPCS (ALT 636 FOR OP/ED): Performed by: STUDENT IN AN ORGANIZED HEALTH CARE EDUCATION/TRAINING PROGRAM

## 2024-06-03 PROCEDURE — 99233 SBSQ HOSP IP/OBS HIGH 50: CPT | Performed by: STUDENT IN AN ORGANIZED HEALTH CARE EDUCATION/TRAINING PROGRAM

## 2024-06-03 PROCEDURE — 2500000002 HC RX 250 W HCPCS SELF ADMINISTERED DRUGS (ALT 637 FOR MEDICARE OP, ALT 636 FOR OP/ED)

## 2024-06-03 PROCEDURE — 2500000004 HC RX 250 GENERAL PHARMACY W/ HCPCS (ALT 636 FOR OP/ED)

## 2024-06-03 PROCEDURE — 2500000001 HC RX 250 WO HCPCS SELF ADMINISTERED DRUGS (ALT 637 FOR MEDICARE OP): Performed by: STUDENT IN AN ORGANIZED HEALTH CARE EDUCATION/TRAINING PROGRAM

## 2024-06-03 PROCEDURE — 36415 COLL VENOUS BLD VENIPUNCTURE: CPT

## 2024-06-03 PROCEDURE — 97530 THERAPEUTIC ACTIVITIES: CPT | Mod: GP | Performed by: PHYSICAL THERAPIST

## 2024-06-03 PROCEDURE — 84100 ASSAY OF PHOSPHORUS: CPT | Performed by: STUDENT IN AN ORGANIZED HEALTH CARE EDUCATION/TRAINING PROGRAM

## 2024-06-03 PROCEDURE — 83735 ASSAY OF MAGNESIUM: CPT | Performed by: STUDENT IN AN ORGANIZED HEALTH CARE EDUCATION/TRAINING PROGRAM

## 2024-06-03 PROCEDURE — 99233 SBSQ HOSP IP/OBS HIGH 50: CPT

## 2024-06-03 PROCEDURE — 85027 COMPLETE CBC AUTOMATED: CPT

## 2024-06-03 PROCEDURE — 85520 HEPARIN ASSAY: CPT

## 2024-06-03 PROCEDURE — 1170000001 HC PRIVATE ONCOLOGY ROOM DAILY

## 2024-06-03 PROCEDURE — 83010 ASSAY OF HAPTOGLOBIN QUANT: CPT

## 2024-06-03 PROCEDURE — 2500000002 HC RX 250 W HCPCS SELF ADMINISTERED DRUGS (ALT 637 FOR MEDICARE OP, ALT 636 FOR OP/ED): Mod: MUE

## 2024-06-03 PROCEDURE — 85025 COMPLETE CBC W/AUTO DIFF WBC: CPT

## 2024-06-03 RX ORDER — METOPROLOL SUCCINATE 50 MG/1
50 TABLET, EXTENDED RELEASE ORAL 2 TIMES DAILY
Status: DISCONTINUED | OUTPATIENT
Start: 2024-06-03 | End: 2024-06-03

## 2024-06-03 RX ORDER — ALBUMIN HUMAN 250 G/1000ML
25 SOLUTION INTRAVENOUS EVERY 6 HOURS
Status: COMPLETED | OUTPATIENT
Start: 2024-06-03 | End: 2024-06-05

## 2024-06-03 RX ORDER — MAGNESIUM SULFATE 1 G/100ML
1 INJECTION INTRAVENOUS ONCE
Status: COMPLETED | OUTPATIENT
Start: 2024-06-03 | End: 2024-06-03

## 2024-06-03 RX ADMIN — HEPARIN SODIUM 1650 UNITS/HR: 10000 INJECTION, SOLUTION INTRAVENOUS at 05:00

## 2024-06-03 RX ADMIN — MAGNESIUM SULFATE HEPTAHYDRATE 1 G: 1 INJECTION, SOLUTION INTRAVENOUS at 10:52

## 2024-06-03 RX ADMIN — PANTOPRAZOLE SODIUM 40 MG: 40 INJECTION, POWDER, FOR SOLUTION INTRAVENOUS at 09:03

## 2024-06-03 RX ADMIN — OXYCODONE HYDROCHLORIDE AND ACETAMINOPHEN 1000 MG: 500 TABLET ORAL at 09:03

## 2024-06-03 RX ADMIN — Medication 2 L/MIN: at 08:00

## 2024-06-03 RX ADMIN — ASPIRIN 81 MG: 81 TABLET, COATED ORAL at 21:56

## 2024-06-03 RX ADMIN — NYSTATIN 1 APPLICATION: 100000 POWDER TOPICAL at 21:56

## 2024-06-03 RX ADMIN — TAMSULOSIN HYDROCHLORIDE 0.4 MG: 0.4 CAPSULE ORAL at 09:03

## 2024-06-03 RX ADMIN — PANTOPRAZOLE SODIUM 40 MG: 40 INJECTION, POWDER, FOR SOLUTION INTRAVENOUS at 21:56

## 2024-06-03 RX ADMIN — METOPROLOL SUCCINATE 150 MG: 50 TABLET, EXTENDED RELEASE ORAL at 09:03

## 2024-06-03 RX ADMIN — CEPHALEXIN 500 MG: 500 CAPSULE ORAL at 13:42

## 2024-06-03 RX ADMIN — ALBUMIN HUMAN 25 G: 0.25 SOLUTION INTRAVENOUS at 12:06

## 2024-06-03 RX ADMIN — DEXTROMETHORPHAN 30 MG: 30 SUSPENSION, EXTENDED RELEASE ORAL at 21:57

## 2024-06-03 RX ADMIN — Medication 3 L/MIN: at 20:00

## 2024-06-03 RX ADMIN — NYSTATIN 1 APPLICATION: 100000 POWDER TOPICAL at 09:03

## 2024-06-03 RX ADMIN — CEPHALEXIN 500 MG: 500 CAPSULE ORAL at 05:36

## 2024-06-03 RX ADMIN — ALBUMIN HUMAN 25 G: 0.25 SOLUTION INTRAVENOUS at 23:58

## 2024-06-03 RX ADMIN — CEPHALEXIN 500 MG: 500 CAPSULE ORAL at 21:57

## 2024-06-03 RX ADMIN — ROSUVASTATIN CALCIUM 10 MG: 10 TABLET, FILM COATED ORAL at 21:56

## 2024-06-03 RX ADMIN — BENZONATATE 100 MG: 100 CAPSULE ORAL at 09:03

## 2024-06-03 RX ADMIN — ALBUMIN HUMAN 25 G: 0.25 SOLUTION INTRAVENOUS at 17:51

## 2024-06-03 ASSESSMENT — COGNITIVE AND FUNCTIONAL STATUS - GENERAL
MOVING TO AND FROM BED TO CHAIR: A LOT
DAILY ACTIVITIY SCORE: 12
MOVING FROM LYING ON BACK TO SITTING ON SIDE OF FLAT BED WITH BEDRAILS: A LOT
TURNING FROM BACK TO SIDE WHILE IN FLAT BAD: A LOT
MOBILITY SCORE: 11
WALKING IN HOSPITAL ROOM: A LOT
TURNING FROM BACK TO SIDE WHILE IN FLAT BAD: A LOT
TOILETING: A LOT
CLIMB 3 TO 5 STEPS WITH RAILING: TOTAL
HELP NEEDED FOR BATHING: A LOT
MOVING TO AND FROM BED TO CHAIR: TOTAL
CLIMB 3 TO 5 STEPS WITH RAILING: TOTAL
EATING MEALS: A LOT
WALKING IN HOSPITAL ROOM: TOTAL
MOBILITY SCORE: 8
MOVING FROM LYING ON BACK TO SITTING ON SIDE OF FLAT BED WITH BEDRAILS: A LOT
STANDING UP FROM CHAIR USING ARMS: TOTAL
DRESSING REGULAR UPPER BODY CLOTHING: A LOT
DRESSING REGULAR LOWER BODY CLOTHING: A LOT
STANDING UP FROM CHAIR USING ARMS: A LOT
PERSONAL GROOMING: A LOT

## 2024-06-03 ASSESSMENT — ACTIVITIES OF DAILY LIVING (ADL): LACK_OF_TRANSPORTATION: NO

## 2024-06-03 ASSESSMENT — PAIN - FUNCTIONAL ASSESSMENT
PAIN_FUNCTIONAL_ASSESSMENT: 0-10
PAIN_FUNCTIONAL_ASSESSMENT: 0-10

## 2024-06-03 ASSESSMENT — PAIN SCALES - GENERAL
PAINLEVEL_OUTOF10: 0 - NO PAIN

## 2024-06-03 NOTE — PROGRESS NOTES
"Subjective     Overnight events:  NAEO, evaluated with wife present and answered questions during rounds.    Vital signs:  Blood pressure 119/69, pulse 84, temperature 36.8 °C (98.2 °F), temperature source Temporal, resp. rate 20, height 1.727 m (5' 7.99\"), weight 121 kg (266 lb 4.8 oz), SpO2 99%.    I/O last 3 completed shifts:  In: 1477.8 (12.2 mL/kg) [P.O.:360; I.V.:811.5 (6.7 mL/kg); Blood:306.3]  Out: 1000 (8.3 mL/kg) [Urine:1000 (0.2 mL/kg/hr)]  Weight: 120.8 kg     Physical Exam  Constitutional:       General: He is not in acute distress.  Eyes:      Extraocular Movements: Extraocular movements intact.   Pulmonary:      Effort: Pulmonary effort is normal.      Breath sounds: No wheezing, rhonchi or rales.      Comments: Decreased breath sounds on left  Abdominal:      General: Bowel sounds are normal. There is distension.      Palpations: Abdomen is soft.      Tenderness: There is no abdominal tenderness.   Musculoskeletal:      Comments: Mild bilat LE edema 1+   Neurological:      General: No focal deficit present.      Mental Status: He is oriented to person, place, and time.         Relevant Results        Labs:  Last CBC:  Lab Results   Component Value Date    WBC 3.7 (L) 06/03/2024    HGB 7.0 (L) 06/03/2024    HCT 21.3 (L) 06/03/2024    MCV 88 06/03/2024    PLT 95 (L) 06/03/2024       Last RFP:  Lab Results   Component Value Date    GLUCOSE 91 06/03/2024    CALCIUM 7.7 (L) 06/03/2024     06/03/2024    K 4.0 06/03/2024    CO2 24 06/03/2024     06/03/2024    BUN 40 (H) 06/03/2024    CREATININE 3.37 (H) 06/03/2024       Last LFTs:  Lab Results   Component Value Date    ALT 9 (L) 06/03/2024    AST 27 06/03/2024    ALKPHOS 63 06/03/2024    BILITOT 0.4 06/03/2024       Last coags:  Lab Results   Component Value Date    INR 1.1 05/26/2024    APTT 28 05/30/2024       Micro/culture data:  No results found for the last 90 days.      Imaging:  ECG 12 Lead  Atrial flutter with variable AV block with " premature ventricular or aberrantly conducted complexes  Low voltage QRS  Abnormal ECG  When compared with ECG of 28-MAY-2024 01:40,  Atrial flutter has replaced Atrial fibrillation  Nonspecific T wave abnormality, improved in Inferior leads  Confirmed by Jovanny Loera ([id number]) on 5/31/2024 1:51:33 PM  Flexible Sigmoidoscopy  Table formatting from the original result was not included.  Impression  Dark red blood visualized in the rectum. Blood was cleared without any   active bleeding appreciated nor underlying mucosal changes.   Few small and large, scattered diverticula with no inflammation in the   sigmoid colon; no bleeding was identified. Diverticula were irrigated   without any bleeding or stigmata of recent bleed appreciated.  All observed locations appeared normal, including the sigmoid colon,   rectosigmoid and rectum. No mucosal abnormalities. Normal on retroflexion.  Green bilious stool proximal to the rectum, most notably in the proximal   descending colon and transverse colon, without any blood, blood clots or   hematin.    Findings  Dark red blood visualized in the rectum. Blood was cleared without any   active bleeding appreciated nor underlying mucosal changes.   Few small and large, scattered diverticula with no inflammation in the   sigmoid colon; no bleeding was identified. Diverticula were irrigated   without any bleeding or stigmata of recent bleed appreciated.  All observed locations appeared normal, including the sigmoid colon,   rectosigmoid and rectum. No mucosal abnormalities. Normal on retroflexion.  Green bilious stool proximal to the rectum, most notably in the proximal   descending colon and transverse colon, without any blood, blood clots or   hematin.    Recommendation   Follow up with primary gastroenterologist    Follow up with inpatient GI consult service, Dr. Valenzuela and Dr. Archibald  Continue on IV PPI BID        Indication  Iron deficiency anemia due to chronic blood  loss    Staff  Staff Role   Kaylen Valenzuela MD Proceduralist   Ale Archibald MD    Medications  micafungin (Mycamine) 100 mg in dextrose 5% 100 mL IV 95.24 mg*    *From user-documented volume   fentaNYL PF (Sublimaze) injection  - Omnicell Override Pull Cannot be   calculated*    *Administration dose not documented   midazolam (Versed) injection  - Omnicell Override Pull Cannot be   calculated*    *Administration dose not documented   (Totals for administrations occurring from 1228 to 1404 on 05/24/24)     Preprocedure  A history and physical has been performed, and patient medication   allergies have been reviewed. The patient's tolerance of previous   anesthesia has been reviewed. The risks and benefits of the procedure and   the sedation options and risks were discussed with the patient. All   questions were answered and informed consent obtained.    Details of the Procedure  The patient underwent no sedation. The patient's blood pressure, ECG,   heart rate, level of consciousness, oxygen and respirations were monitored   throughout the procedure. A digital rectal exam was performed. A perianal   exam was performed. The scope was introduced through the anus and advanced   to the transverse colon. Retroflexion was performed in the rectum. The   quality of bowel preparation was evaluated using the [place] Bowel   Preparation Scale with scores of: left colon = 3. Bowel prep was not   adequate. The patient experienced no blood loss. The procedure was not   difficult. The patient tolerated the procedure well. There were no   apparent adverse events.     Events  Procedure Events   Event Event Time   ENDO SCOPE IN TIME 5/24/2024  1:08 PM   ENDO SCOPE OUT TIME 5/24/2024  1:26 PM     Specimens  No specimens collected    Procedure Location  [facility]  [address] [facility] [address]  [phone]    Referring Provider  Grayson CLARK  Winner, APRN-CNP  [address] [address]    Procedure Provider  Ale Archibald MD  Lower extremity venous duplex left  Narrative: Interpreted By:  Nathan Argueta,  and Chaparrita Randolph   STUDY:  [facility] US LOWER EXTREMITY VENOUS DUPLEX LEFT;  5/30/2024 3:58 pm      INDICATION:  Signs/Symptoms:LLE swelling, eval for DVT.      COMPARISON:  None.      ACCESSION NUMBER(S):  [id number]      ORDERING CLINICIAN:  ALEXANDRA AVILA      TECHNIQUE:  Vascular ultrasound of the left lower extremity was performed.  Real-time compression views as well as Gray scale, color Doppler and  spectral Doppler waveform analysis was performed.      FINDINGS:  Evaluation of the visualized portions of the left common femoral  vein, proximal, mid, and distal femoral vein, and popliteal vein were  performed.  Evaluation of the visualized portions of the  posterior  tibial and peroneal veins were also performed. Evaluation of the calf  veins limited due to edema.      Echogenic intraluminal material in the proximal greater saphenous  vein with partial compressibility and color Doppler signal The  evaluated veins demonstrate normal compressibility. There is intact  venous flow demonstrating normal respiratory variability and normal  augmentation of flow with calf compression. Therefore, there is no  ultrasonographic evidence for deep vein thrombosis within the  evaluated veins.      Impression: 1.  No sonographic evidence for deep vein thrombosis within the  evaluated veins of the left lower extremity.  2. Nonocclusive thrombus of the proximal greater saphenous vein, a  superficial vein. Recommend correlation with concern for superficial  thrombophlebitis.      I personally reviewed the images/study and I agree with the findings  as stated by Agustín Cheatham MD. This study was interpreted at  [facility], [place], [place].      MACRO:  None      Signed by: Nathan Argueta 5/31/2024 5:32 AM  Dictation  workstation:   [id number]         Assessment/Plan   Principal Problem:    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Multi)  Active Problems:    Anemia    Vinicius Arriola is a 88 y.o. male with PMHx of PAD, afib/flutter, hx DVT on warfarin, HTN, CAD s/p stent, mitral regurg, HLD, polycythemia vera, managed by Dr. 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 Presented to [facility] 5/14 with abdominal pain, fever, diarrhea, urinary retention c/f sepsis presumed 2/2 PNA; CT CAP showed known LLL collapse 2/2 mesothelioma, new post L chest wall invasion, and c/f L pulmonary trunk tumor thrombus.  5/23 developed shock and acute hypoxic resp failure requiring MICU transfer for pressors and NIV (airvo), thought 2/2 GI bleeding with melena and coffee ground drainage from NGT. Quickly weaned off pressors, s/p flex sig with blood in rectum but no active bleed. Now transferred back to floor for further mgmt.      Updates:   - Pending PT/OT evaluation needed for SNF planning  - Anemia workup revels mixed picture KAY and anemia of chronic disease  - D/c heparin ggt  - GI consult  - Albumin challenge    #History Afib, HLD, CAD, PAD  #History DVT/PE on Warfarin  :: 5/21 TTE showing:  Left ventricular systolic function is hyperdynamic with a 70-75% estimated ejection fraction, Mildly elevated RVSP, Moderately dilated aortic root   Continues to have episodes of Afib w/ RVR occasional Aflut likely multifactorial  - Metop tartrate at 50mg TID > Metop Succinate 150 mg qd  - Holding warfarin, aspirin  - Continue home statin  - D/c Heparin ggt      #Anemia  :: Likely 2/2 GIB noted this admission  :: Melena and coffee grounds via NGT noted 5/22  :: Hgb initially 10, decreased to 6.9 on 5/23 (1u pRBC) > slow decrease again to 6.8 on 6/2 (1u pRBC)  :: Fe/TIBC, B12, B9,   - Continue to trend Hgb,  "platelets goal > 20. Platelet goal liberalized as no active bleeding, if hgb decreasing or bleeding clinically then transfuse to goal 50  - Anemia workup revels Mixed picture KAY and anemia of chronic disease  - Will start Iron supplement following negative C. Diff (previously held with c/f alternative infectious processes)  - Hapto Pending  - GI consulted    #Worsening mesothelioma  #Tumor Thrombus  #Mesothelioma with Loculated Pleural Effusions   #Left Hilar Mass, Left Pulmonary Trunk Mass  #History JOVANNI, Pulmonary Embolism  :: primary oncologist Dr. Galvan  :: s/p L VATS w/ decort 5/2022, XRT 9-10/22  :: s/p \"half-dose\" pemfexy on 5/14  :: CT chest 5/18 with significant collapse of left lung, loculated pleural effusions consistent with patient's known mesothelioma.  Also shows ill-defined patchy infiltrate in right lower lobe.  Shows left hilar mass with extension into left pulmonary trunk likely tumor thrombus   - Holding warfarin, continue to monitor   - Dr. Henderson recommends transitioning to Eliquis on discharge, deferring anticoagulation iso thrombocytopenia. Plt stable above 50, resume AC (Heparin ggt; low intensity); plan to transition to Eliquis 2.5mg BID (if tolerating heparin...)  - Continuing protocol w/ 1 mg folic acid daily  - Hospice Consult for informational meeting 5/31. Not currently interested in pursuing hospice.    #Tachypnea   #Elevated A-a gradient   #AHRF (Improved)  :: multifactorial, like d/t shunt secondary to malignancy vs deconditioning given prolonged admission vs intermittent Afib vs v/q mismatch   :: RR 23-44   :: 5/22 ABG: pH 7.53, pCO2 31, pO2 109, Lactate 2.2, FiO2 40.  - On 4L nasal cannula previously requiring intermittent AIRVO to assist with work of breathing.   - gradually wean supplemental O2 for SpO2 goal >90%   - continue abx regimen  - CTM     #Coffee grounds via NGT (placed 5/21), improved   #Melena likely d/t GI bleed  :: KUB 5/21, showing moderate distention of stomach " with nonspecific predominantly fluid-filled bowel pattern.  :: 5/20-5/21 patient emesis that is watery, dark brown, malodorous   :: did initially present w/ diarrhea could have been chemo induced vs stool ball  :: INR 4.9>6.3>2.4 >1.2  :: s/p Vitamin K 10 mg  :: KUB w/ dilated bowel loops   - Soft diet, encouraged patient to go slow  - holding ferrous sulfate    - pantoprazole 40 bid   - GI consulted, appreciate recs  -s/p flex sig with no evidence of active bleed              -Will hold off EGD since coffee ground emesis resolved     #Illeus (improved/resolved)  :: KUB 5/26 kub shows distended bowel loops likely ileus, will monitor for improvement with PO diet.   - NG tube clamped; If becoming nauseous or developing vomiting, can unclamp NGT and place to LIWS.  - Tolerating PO and having BM. Will CTM     #Pneumonia, Right lower lobe ill-defined patchy infiltrate (resolved)  :: Resp Cult salivary contamination  :: MRSA nares: negative  :: Strep/Legionella antigens: negative  :: CXR 5/21 showing persistent patchy right infrahilar airspace opacities; improved on serial CXRs  :: 5/22 CT CAP w/o contrast: Interval improvement of the right lower lobe patchy infiltrate  :: s/p Azithro (5/16-5/18)  :: s/p vanc (5/16-5/18) (5/20-5/22) and zosyn (5/16-5/18) (5/20-22)  -eropenem (5/19-5/20) (5/22-25) and micafungin (5/22-25)  -Abx stopped 5/25 given prolonged course with improvement/decline independent of abx, do not suspect active infection currently.       #Fever  #Sepsis most likely d/t pneumonia  :: at OSH met SIRS criteria (febrile, tachypnea)  :: UA 5/18 with small LE, 3+ bacteria, urine cx 5/21 negative  :: Resp Cult salivary contamination  :: MRSA nares: negative  :: Strep/Legionella antigens: negative  :: c diff and enteric stool panel: negative  :: BC 5/16 NGTD, repeat BC 5/20 NGTD  :: CRP 30.46>42.36  :: CXR 5/21 showing persistent patchy right infrahilar airspace opacities    :: s/p Lenny (5/16-5/18)  ::  asymmetric warmth, erythema, edema noted in left lower extremity   :: s/p vanc (5/16-5/18) (5/20-5/25) and zosyn (5/16-5/18) (5/20-22)  :: 5/22 CT CAP w/o contrast: Interval improvement of the right lower lobe patchy infiltrate, Interval flattening of the inferior vena cava which may be seen with hypovolemia, Improved proximal sigmoid diverticulitis.  - repeat blood cultures drawn 5/22, NGTD   - meropenem (5/19-5/20) (5/22-5/25) and micafungin (5/22-5/25); restarted Vanc/Sofy briefly overnight 5/27.     #Lower leg skin changes likely d/t cellulitis   :: previously improved margins of left lower extremity skin changes  - Margins worsened on morning exam 5/30, tender to palpation, slightly warmer than adjacent leg.   - CTM  - Keflex 500 q8 5 day course for cellulitis (5/30-6/3)     #VIK on CKD, likely prerenal d/t fluid status   # CKD (baseline Cr 1.3-1.4)  # urine retention s/p Boyd Catheter Placement by Urology in OSH  Estimated Creatinine Clearance: 19.9 mL/min (A) (by C-G formula based on SCr of 3.37 mg/dL (H)).  :: US renal 5/16: Nonobstructing 1 cm left renal calculus, no hydronephrosis  :: Cr 1.64>2.26>3.00 (Baseline Cr 1.3-1.4)  :: Urine electrolytes pre-renal, Net negative, hypernatremia with prolonged NPO all suggest pre-renal hypovolemic VIK. Will encourage PO intake  - will eventually need boyd removal and trial  - strict in/out  - tamsulosin 0.4 mg daily  -minimize nephrotoxic medications as able  - boyd removed 5/23/24 (5/16-5/23)  - Albumin 25 QID (2days)     #Pancytopenia, most likely medication induced from pemfexy, resolved  #Thrombocytopenia  :: c/f TTP  ::   :: s/p pemfexy 5/14  :: d dimer 1521, fibrinogen >1000- no c/f dic  :: no schistocytes on smear  ::   :: filgrastim 480 mcg (5/21-5/22)  - 5/23 ANC 4130   - haptoglobin 400   - Platelets 55>38, ctm. Goal >10  - s/p 1u platelets 5/23 (was bleeding at the time)     #Elevated INR  :: INR 4.9>6.3>2.4 >1.2  :: s/p Vitamin K 10  mg  -CTM    # Hospital-acquired Delirium  :: waxing and waning altered mental status  -Delirium precautions  -hold home trazodone for insomnia      Antibiotics:: none  Dispo: SNF.      F: Replete PRN  E: Replete PRN  N: Regular Diet  DVT Ppx: None iso of low Plt  GI Ppx: Pantoprazole   Access/Lines: PIV x3  Forrest External (5/23- )  Abx: None  O2: 2L NC    Pain regimen: Tylenol   GI Laxative: None     Code status: DNR and No Intubation  Emergency contact:Kirsten Arriola (Spouse) [phone]        Ramesh Conley MD  PGY-1 Internal Medicine

## 2024-06-03 NOTE — CONSULTS
"Nutrition Follow Up Assessment:   Nutrition Assessment    Reason for Assessment: Dietitian discretion (follow up)    Patient is a 88 y.o. male originally presented to OSH w/ abdominal pain, fever, and diarrhea post first cycle of chemo. Treated empirically for sepsis iso collapse of L lung. Concern for tumor thrombus - transferred to [facility]. Pt had two episodes of brom malodorous emesis - NG placed for decompression. GI consulted for UGIB.     5/22: Last RDN note  5/23: developed shock and acute hypoxic resp failure -- transfer to MICU for pressures and airvo thought 2/2 GI bleeding w melena and coffee ground drain from NGT  5/24: transferred to [facility]  5/29: family decide not to pursue further chemo at this time  5/31: hospice informational meeting, decided against signing with hospice; SLP recommend easy to chew    Nutrition History:  Energy Intake: Good > 75 %  Food and Nutrient History: Met with patient and family this morning. Family reports that pt has been eating ~ 2 meals per day + ~ 2 ensure high protein drinks daily. Pt has no other complaints. This morning pt ate: cream wheat, banana, ensure high protein, orange juice  GI Symptoms: None    Anthropometrics:  Height: 172.7 cm (5' 7.99\")   Weight: 121 kg (266 lb 4.8 oz)   BMI (Calculated): 40.5  IBW/kg (Dietitian Calculated): 70 kg  Percent of IBW: 157 %       Weight History:   Date/Time Weight   05/28/24 0512 121 kg (266 lb 4.8 oz)   05/26/24 0542 109 kg (239 lb 12.8 oz)   05/24/24 0600 115 kg (253 lb 4.9 oz)     Weight Change %:  Weight History / % Weight Change: wt has been fluctating likely d/t fluid and scale changes      Nutrition Significant Labs:  CBC Trend:   Results from last 7 days   Lab Units 06/03/24  0655 06/02/24  1813 06/02/24  0625 06/01/24  0511   WBC AUTO x10*3/uL 3.7* 4.9 4.1* 4.9   RBC AUTO x10*6/uL 2.41* 2.84* 2.31* 2.48*   HEMOGLOBIN g/dL 7.0* 8.3* 6.8* 7.0*   HEMATOCRIT % 21.3* 25.6* 20.9* 20.8*   MCV fL 88 90 91 84   PLATELETS " AUTO x10*3/uL 95* 92* 81* 75*    , BMP Trend:   Results from last 7 days   Lab Units 06/03/24  0655 06/02/24  0625 06/01/24  0511 05/31/24  0709   GLUCOSE mg/dL 91 94 96 90   CALCIUM mg/dL 7.7* 7.7* 7.5* 7.6*   SODIUM mmol/L 139 140 142 139   POTASSIUM mmol/L 4.0 4.0 4.1 4.1   CO2 mmol/L 24 25 23 24   CHLORIDE mmol/L 105 104 106 105   BUN mg/dL 40* 41* 44* 46*   CREATININE mg/dL 3.37* 3.27* 3.27* 3.20*    , BG POCT trend:   Results from last 7 days   Lab Units 05/28/24  0148   POCT GLUCOSE mg/dL 106*    , Liver Function Trend:   Results from last 7 days   Lab Units 06/03/24  0655 06/02/24 0625 06/01/24  0511 05/31/24  0709   ALK PHOS U/L 63 58 62 61   AST U/L 27 27 31 31   ALT U/L 9* 8* 9* 8*   BILIRUBIN TOTAL mg/dL 0.4 0.4 0.5 0.5    , Renal Lab Trend:   Results from last 7 days   Lab Units 06/03/24  0655 06/02/24 0625 06/01/24 0511 05/31/24  0709   POTASSIUM mmol/L 4.0 4.0 4.1 4.1   PHOSPHORUS mg/dL 4.7 4.8 4.5 4.3   SODIUM mmol/L 139 140 142 139   MAGNESIUM mg/dL 1.80 1.85 1.90 1.95   EGFR mL/min/1.73m*2 17* 18* 18* 18*   BUN mg/dL 40* 41* 44* 46*   CREATININE mg/dL 3.37* 3.27* 3.27* 3.20*    , Vit B12:   Lab Results   Component Value Date    [id number] 1,287 (H) 06/02/2024        Nutrition Specific Medications:  Scheduled medications  ascorbic acid, 1,000 mg, oral, Daily  aspirin, 81 mg, oral, Nightly  cephalexin, 500 mg, oral, q8h ADELSO  dextromethorphan, 30 mg, oral, q12h ADELSO  ergocalciferol, 1,250 mcg, oral, Every Sunday  magnesium sulfate, 1 g, intravenous, Once  [START ON 6/4/2024] metoprolol succinate XL, 150 mg, oral, Daily  nystatin, 1 Application, Topical, BID  oxygen, , inhalation, Continuous - Inhalation  pantoprazole, 40 mg, intravenous, BID  perflutren protein A microsphere, 0.5 mL, intravenous, Once in imaging  rosuvastatin, 10 mg, oral, Nightly  sulfur hexafluoride microsphr, 2 mL, intravenous, Once in imaging  tamsulosin, 0.4 mg, oral, Daily        I/O:   Last BM Date: 06/03/24; Stool  Appearance: Watery (06/03/24 0213)    Dietary Orders (From admission, onward)       Start     Ordered    06/01/24 1135  Oral nutritional supplements  Until discontinued        Question Answer Comment   Deliver with All meals    Select supplement: Ensure High Protein        06/01/24 1134    05/26/24 1006  Adult diet Regular; Easy to chew  Diet effective now        Question Answer Comment   Diet type Regular    Texture Easy to chew        05/26/24 1005                     Estimated Needs:   Total Energy Estimated Needs (kCal): 1900 kCal  Method for Estimating Needs: 27 kcal/kg IBW  Total Protein Estimated Needs (g): 90 g  Method for Estimating Needs: 1.3 g/kg IBW     Method for Estimating Needs: 1 ml/kcal or per team        Nutrition Diagnosis        Nutrition Diagnosis  Patient has Nutrition Diagnosis: Yes  Diagnosis Status (1): Ongoing  Nutrition Diagnosis 1: Increased nutrient needs  Related to (1): increased metabolic demand  As Evidenced by (1): mesothelioma dx + chemo       Nutrition Interventions/Recommendations         Nutrition Prescription:  Continue diet per team discretion/ as tolerated  Continue Ensure High Protein TID (160 kcal, 16g PRO) each   Consider a MVI supplementation   Check vitamin D and supplement as needed         Nutrition Interventions:   Interventions: Meals and snacks, Medical food supplement  Meals and Snacks: General healthful diet  Goal: as tolerated  Medical Food Supplement: Commercial beverage  Goal: Ensure high protien TID      Nutrition Monitoring and Evaluation   Food/Nutrient Related History Monitoring  Monitoring and Evaluation Plan: Energy intake  Energy Intake: Estimated energy intake  Criteria: >75% of estimated energy needs    Body Composition/Growth/Weight History  Monitoring and Evaluation Plan: Weight    Biochemical Data, Medical Tests and Procedures  Monitoring and Evaluation Plan: Electrolyte/renal panel, Glucose/endocrine profile  Electrolyte and Renal Panel: Sodium,  Potassium, Phosphorus, Magnesium  Criteria: WNL  Glucose/Endocrine Profile: Glucose, casual  Criteria: WNL      Time Spent/Follow-up Reminder:   Time Spent (min): 45 minutes  Last Date of Nutrition Visit: 06/03/24  Nutrition Follow-Up Needed?: Dietitian to reassess per policy  Follow up Comment: diet + ensure high protein

## 2024-06-03 NOTE — PROGRESS NOTES
06/03/24 1600   Discharge Planning   Living Arrangements Spouse/significant other   Support Systems Spouse/significant other;Children   Type of Residence Private residence   Number of Stairs to Enter Residence 3   Do you have animals or pets at home? No   Who is requesting discharge planning? Provider   Home or Post Acute Services Post acute facilities (Rehab/SNF/etc)   Type of Post Acute Facility Services Skilled nursing   Patient expects to be discharged to: SNF   Does the patient need discharge transport arranged? Yes   RoundTrip coordination needed? Yes   Financial Resource Strain   How hard is it for you to pay for the very basics like food, housing, medical care, and heating? Not very   Housing Stability   In the last 12 months, was there a time when you were not able to pay the mortgage or rent on time? N   In the last 12 months, how many places have you lived? 1   In the last 12 months, was there a time when you did not have a steady place to sleep or slept in a shelter (including now)? N   Transportation Needs   In the past 12 months, has lack of transportation kept you from medical appointments or from getting medications? no   In the past 12 months, has lack of transportation kept you from meetings, work, or from getting things needed for daily living? No     SW met with pt and pt's spouse.  Spouse reports hospice meeting occurred and they wish to pursue SNF placement at this time.  SW provided SNF list.  [facility] is first choice.  SW asked for additional 3 choices in case there is not availability at [facility].  Referral made to [facility] via [facility].  Will follow.  MICHELLE Vera

## 2024-06-03 NOTE — PROGRESS NOTES
"[facility]  CONSULT FOLLOW-UP     Reason For Consult  Acute on chronic anemia    SUBJECTIVE     Patient denied n/v and abdominal pain. He is having BMs and they are brown. He denied hematochezia, hematuria, and hemoptysis.    Per patient's wife at bedside, his hgb before doing chemo was 14. Since starting chemo outpatient he was on an iron pill. She doesn't think he has been getting it while inpatient.    EXAM     Last Recorded Vitals  Blood pressure 119/69, pulse 84, temperature 36.8 °C (98.2 °F), temperature source Temporal, resp. rate 20, height 1.727 m (5' 7.99\"), weight 121 kg (266 lb 4.8 oz), SpO2 99%.      Intake/Output Summary (Last 24 hours) at 6/3/2024 1001  Last data filed at 6/3/2024 0917  Gross per 24 hour   Intake 926.57 ml   Output 1150 ml   Net -223.43 ml     Physical Exam   General: chronically ill appearing elderly male   HEENT: no pallor, no icterus  Respiratory: CTA anteriorly   Cardiovascular: RRR  Abdomen: Soft, nontender  Neuro: alert and oriented X3, strength and sensations grossly normal     OBJECTIVE                                                                              Medications             Current Facility-Administered Medications:     acetaminophen (Tylenol) oral liquid 650 mg, 650 mg, oral, q4h PRN **OR** acetaminophen (Tylenol) tablet 650 mg, 650 mg, oral, q4h PRN, Ramesh Conley MD    albuterol 2.5 mg /3 mL (0.083 %) nebulizer solution 2.5 mg, 2.5 mg, nebulization, q6h PRN, Rhianna Holland MD    ascorbic acid (Vitamin C) tablet 1,000 mg, 1,000 mg, oral, Daily, Ramesh Conley MD, 1,000 mg at 06/03/24 0903    aspirin EC tablet 81 mg, 81 mg, oral, Nightly, Ramesh Conley MD, 81 mg at 06/02/24 2051    benzonatate (Tessalon) capsule 100 mg, 100 mg, oral, TID PRN, Ramesh Conley MD, 100 mg at 06/03/24 0903    carboxymethylcellulose (Refresh Celluvisc) 1 % ophthalmic solution 1 drop, 1 drop, Both Eyes, " PRN, Rhianna Holland MD    cephalexin (Keflex) capsule 500 mg, 500 mg, oral, q8h ADELSO, Ramesh Conley MD, 500 mg at 06/03/24 0536    dextromethorphan (Delsym) 30 mg/5 mL liquid 30 mg, 30 mg, oral, q12h ADELSO, Coretta Miller MD, 30 mg at 06/02/24 2100    diclofenac sodium (Voltaren) 1 % gel 4 g, 4 g, Topical, 4x daily PRN, Rhianna Holland MD    ergocalciferol (Vitamin D-2) capsule 1,250 mcg, 1,250 mcg, oral, Every Sunday, Ramesh Conley MD, 1,250 mcg at 06/02/24 0953    [Held by provider] heparin 25,000 Units in dextrose 5% 250 mL (100 Units/mL) infusion (premix), 0-4,000 Units/hr, intravenous, Continuous, Ramesh Conley MD, Stopped at 06/03/24 0933    heparin bolus from bag 2,000-4,000 Units, 2,000-4,000 Units, intravenous, q4h PRN, Ramesh Conley MD    magnesium sulfate in D5W IV 1 g, 1 g, intravenous, Once, Ramesh Conley MD    melatonin tablet 5 mg, 5 mg, oral, Nightly PRN, Rhianna Holland MD, 5 mg at 06/02/24 2314    [START ON 6/4/2024] metoprolol succinate XL (Toprol-XL) 24 hr tablet 150 mg, 150 mg, oral, Daily, Ramesh Conley MD    nitroglycerin (Nitrostat) SL tablet 0.4 mg, 0.4 mg, sublingual, q5 min PRN, Ramesh Conley MD    nystatin (Mycostatin) 100,000 unit/gram powder 1 Application, 1 Application, Topical, BID, Rhianna Holalnd MD, 1 Application at 06/03/24 0903    ondansetron (Zofran) tablet 4 mg, 4 mg, oral, q8h PRN **OR** ondansetron (Zofran) injection 4 mg, 4 mg, intravenous, q8h PRN, Rhianna Holland MD, 4 mg at 05/21/24 0930    oxygen (O2) therapy, , inhalation, Continuous - Inhalation, Rhianna Holland MD, 2 L/min at 06/03/24 0800    oxygen (O2) therapy, , inhalation, Continuous PRN - O2/gases, Rhianna Holland MD, 4 L/min at 05/24/24 0000    pantoprazole (ProtoNix) injection 40 mg, 40 mg, intravenous, BID, Rhianna Holland MD, 40 mg at 06/03/24 0903    perflutren protein A microsphere (Optison) injection 0.5 mL, 0.5 mL, intravenous, Once in imaging, Rhianna Holland MD     rosuvastatin (Crestor) tablet 10 mg, 10 mg, oral, Nightly, Ramesh Conley MD, 10 mg at 06/02/24 2051    sulfur hexafluoride microsphr (Lumason) injection 24.28 mg, 2 mL, intravenous, Once in imaging, Rhianna Holland MD    tamsulosin (Flomax) 24 hr capsule 0.4 mg, 0.4 mg, oral, Daily, Ramesh Conley MD, 0.4 mg at 06/03/24 0903    [Held by provider] traZODone (Desyrel) tablet 100 mg, 100 mg, oral, Nightly, Rhianna Holland MD, 100 mg at 05/20/24 2042                                                                            Labs     Results for orders placed or performed during the hospital encounter of 05/19/24 (from the past 24 hour(s))   Vitamin B12   Result Value Ref Range    Vitamin B12 1,287 (H) 211 - 911 pg/mL   Folate   Result Value Ref Range    Folate, Serum 11.0 >5.0 ng/mL   Prepare RBC: 1 Units   Result Value Ref Range    PRODUCT CODE U7599Y64     Unit Number [id number]     Unit ABO O     Unit RH NEG     XM INTEP COMP     Dispense Status IS     Blood Expiration Date June 04, 2024 23:59 EDT     PRODUCT BLOOD TYPE 9500     UNIT VOLUME 281    CBC   Result Value Ref Range    WBC 4.9 4.4 - 11.3 x10*3/uL    nRBC 0.0 0.0 - 0.0 /100 WBCs    RBC 2.84 (L) 4.50 - 5.90 x10*6/uL    Hemoglobin 8.3 (L) 13.5 - 17.5 g/dL    Hematocrit 25.6 (L) 41.0 - 52.0 %    MCV 90 80 - 100 fL    MCH 29.2 26.0 - 34.0 pg    MCHC 32.4 32.0 - 36.0 g/dL    RDW 16.0 (H) 11.5 - 14.5 %    Platelets 92 (L) 150 - 450 x10*3/uL   Magnesium   Result Value Ref Range    Magnesium 1.80 1.60 - 2.40 mg/dL   Phosphorus   Result Value Ref Range    Phosphorus 4.7 2.5 - 4.9 mg/dL   CBC and Auto Differential   Result Value Ref Range    WBC 3.7 (L) 4.4 - 11.3 x10*3/uL    nRBC 0.0 0.0 - 0.0 /100 WBCs    RBC 2.41 (L) 4.50 - 5.90 x10*6/uL    Hemoglobin 7.0 (L) 13.5 - 17.5 g/dL    Hematocrit 21.3 (L) 41.0 - 52.0 %    MCV 88 80 - 100 fL    MCH 29.0 26.0 - 34.0 pg    MCHC 32.9 32.0 - 36.0 g/dL    RDW 16.0 (H) 11.5 - 14.5 %    Platelets 95 (L) 150 - 450  x10*3/uL    Neutrophils % 79.1 40.0 - 80.0 %    Immature Granulocytes %, Automated 1.4 (H) 0.0 - 0.9 %    Lymphocytes % 7.3 13.0 - 44.0 %    Monocytes % 4.3 2.0 - 10.0 %    Eosinophils % 7.6 0.0 - 6.0 %    Basophils % 0.3 0.0 - 2.0 %    Neutrophils Absolute 2.93 1.60 - 5.50 x10*3/uL    Immature Granulocytes Absolute, Automated 0.05 0.00 - 0.50 x10*3/uL    Lymphocytes Absolute 0.27 (L) 0.80 - 3.00 x10*3/uL    Monocytes Absolute 0.16 0.05 - 0.80 x10*3/uL    Eosinophils Absolute 0.28 0.00 - 0.40 x10*3/uL    Basophils Absolute 0.01 0.00 - 0.10 x10*3/uL   Comprehensive metabolic panel   Result Value Ref Range    Glucose 91 74 - 99 mg/dL    Sodium 139 136 - 145 mmol/L    Potassium 4.0 3.5 - 5.3 mmol/L    Chloride 105 98 - 107 mmol/L    Bicarbonate 24 21 - 32 mmol/L    Anion Gap 14 10 - 20 mmol/L    Urea Nitrogen 40 (H) 6 - 23 mg/dL    Creatinine 3.37 (H) 0.50 - 1.30 mg/dL    eGFR 17 (L) >60 mL/min/1.73m*2    Calcium 7.7 (L) 8.6 - 10.6 mg/dL    Albumin 2.3 (L) 3.4 - 5.0 g/dL    Alkaline Phosphatase 63 33 - 136 U/L    Total Protein 5.4 (L) 6.4 - 8.2 g/dL    AST 27 9 - 39 U/L    Bilirubin, Total 0.4 0.0 - 1.2 mg/dL    ALT 9 (L) 10 - 52 U/L   Heparin Assay, UFH   Result Value Ref Range    Heparin Unfractionated 0.4 See Comment Below for Therapeutic Ranges IU/mL                                                                            Imaging     CT C/A/P without contrast 5/22:  IMPRESSION:  Chest  1. Interval improvement of the right lower lobe patchy infiltrate.  2. Again seen left lung collapse, left pleural thickening with  invasion into the left posterior chest wall, and small left loculated  pleural effusion consistent with patient's known mesothelioma and is  unchanged when compared to prior CT from 05/18/2024.  3. Unchanged prominent mediastinal and perihilar lymph nodes.  4. Previously noted left pulmonary artery thrombus is not well  evaluated on this exam due to beam hardening artifact, positioning of  patient's  arms, and lack of intravenous contrast.    AXR 5/21:  IMPRESSION:  1.  The enteric tube is positioned within the gastric fundus  2. Advancement of the tube would be recommended for more optimal  positioning  3. Predominantly fluid-filled small bowel and colon pattern  4. Opacification of the visualized left thorax and multiple air  bronchograms within the left lung     AXR 5/21:  IMPRESSION:  1.  The stomach is moderately distended with air otherwise,  nonspecific predominantly fluid-filled bowel pattern     CT Chest/ abd/pelvis 5/18:  IMPRESSION:  CHEST:  1. Significant collapse of the left lung with heterogenous  appearance, pleural thickening and small loculated pleural fluid  measuring 7.8 cm consistent with the patient's known mesothelioma  which have worsened from the prior CT scan dated 06/14/2023 and  grossly unchanged from 12/10/2023 unenhanced CT scan allowing for  differences in techniques. Focus of new left posterior chest wall  invasion noted at the level of 9th 10th and 10th 11th rib spaces.  2. Right lower lobe ill-defined patchy infiltrate measuring 2.3 x 1.4  cm. Trace right-sided pleural effusion with surrounding atelectasis.  3. Redemonstrated left hilar ill-defined enhancing fullness appears  extending to the left pulmonary trunk likely tumor thrombus and felt  less likely bland thrombus which has significantly worsened from  06/14/2023 CT scan.  4. Mildly enlarged multiple mediastinal lymph nodes in the largest in  the subcarinal region measuring 1.6 cm, grossly unchanged from prior.      ABDOMEN-PELVIS:  1. Proximal sigmoid diverticulosis with mild wall thickening could be  related to episodes of underlying mild uncomplicated diverticulitis  2. Other ancillary findings are unchanged.     CT Abd/pelvis 5/17:  IMPRESSION:  1.  Subtle inflammatory change along the proximal sigmoid colon which  may represent a low-grade acute diverticulitis.  No definite  perforation or abscess.  2.  Complete  collapse and consolidation of the left lung with a  loculated effusion at the left lung base, unchanged compared to the  prior chest CT and likely consistent with patient's reported  mesothelioma, possibly chronic post treatment change.  3.  Cardiomegaly with chronic changes suggesting COPD.  4.  Cholelithiasis.  5.  Moderate bilateral renal atrophy with nonobstructing 6 mm left  renal calculus.  6.  Additional chronic changes as described.                                                                         GI Procedures     Flex sig 5/24:  Impression  Blood present in the rectum  Scattered diverticulosis in the sigmoid colon  The sigmoid colon, rectosigmoid and rectum appeared normal.  Green bilious stool coming more proximally, without any blood, blood clots or hematin.  Likely bleeding from rectum or sigmoid, no evidence of active bleeding at this time    ASSESSMENT / PLAN     ASSESSMENT/PLAN:    Mr. Arriola is a 88 y.o. male with Afib, DVT (on Coumadin), HTN, CAD, MR, polycythemia vera (though cannot find evidence of JAK2 mutation), malignant mesothelioma s/p L VATS w/ decort 5/2022, XRT 9/2022 with disease recurrence now s/p half dose pemetrexed [place] [facility] [place]     Gastroenterology was initially consulted for concern for GIB/hematochezia. Pt underwent flex sig on 5/24/2024 with evidence of blood in rectum, though no source or active bleeding site was seen. More proximal to rectum there was brown stool and bile, suggesting source is not upper (EGD not performed). Pt afterwards had ongoing hemodynamic stability and stable Hb. Our team signed off on the 5/27/24. Today we are being re-approached for ongoing acute on chronic anemia w/o s/s of overt GIB. Patient's anemia most likely is multifactorial in nature (slow hgb down trend might be in the setting of daily phlebotomy). At this time, risks of endoscopic evaluation in this patient with complete collapse of the L lung outweigh the benefits (HDS,  hgb stable, and patient is having brown stool).    Recommendations:  - Consider IV iron.  - If patient is having ongoing diarrhea please send C. Diff.  - Supportive care and GOC conversation per primary team.    The above recommendations were communicated to the [facility] team.    Patient was seen and discussed with Dr. Pike.    Gastroenterology will sign off.    Yana Alexis MD  PGY-4 Gastroenterology and Hepatology Fellow  [facility]

## 2024-06-03 NOTE — PROGRESS NOTES
Physical Therapy    Physical Therapy Treatment    Patient Name: Vinicius Arriola  MRN: [id number]  Today's Date: 6/3/2024  Time Calculation  Start Time: 1104  Stop Time: 1134  Time Calculation (min): 30 min    Assessment/Plan   PT Assessment  PT Assessment Results: Decreased strength, Decreased endurance, Impaired balance, Decreased mobility, Decreased safety awareness  Rehab Prognosis: Good  End of Session Communication: Bedside nurse  End of Session Patient Position: Bed, 3 rail up, Alarm off, not on at start of session, Alarm off, caregiver present  PT Plan  Inpatient/Swing Bed or Outpatient: Inpatient  PT Plan  Treatment/Interventions: Bed mobility, Transfer training, Gait training, Balance training, Therapeutic exercise, Strengthening, Therapeutic activity, Home exercise program  PT Plan: Skilled PT  PT Frequency: 3 times per week  PT Discharge Recommendations: Moderate intensity level of continued care  PT Recommended Transfer Status: Assist x2  PT - OK to Discharge: Yes      General Visit Information:   PT  Visit  PT Received On: 06/03/24  General  Reason for Referral: admitted to [facility] on 5/16 with nausea/vomiting and urinary retention after half dose chemo the previous day.   Was found to be hypotensive/tachycardic; Significant collapse of the left lung  Past Medical History Relevant to Rehab: PAD, afib/flutter, hx DVT on warfarin, HTN, CAD s/p stent, mitral regurg, HLD, polycythemia vera, BCC s/p excision, SVC thrombus and malignant mesothelioma s/p L VATS with decort 5/2022, XRT 9-10/2022 due to poor performance status and monitored w/ surveillance, w/ disease recurrence/progression s/p half dose chemo  Family/Caregiver Present: Yes (spouse present and was supportive)   Prior to Session Communication: Bedside nurse  Patient Position Received: Bed, 3 rail up, Alarm off, not on at start of session  Preferred Learning Style: verbal  General Comment: Pt supine in bed upon arrival and willing to participate  inTherapy session. Session ended as pt wants to use bedpan.    Subjective   Precautions:  Precautions  Medical Precautions: Oxygen therapy device and L/min, Fall precautions  Vital Signs:  Vital Signs  Heart Rate:  (HR ranged 91 bpm to 100 bpm)  SpO2: 98 %  BP: 111/71  Patient Position: Sitting    Objective   Pain:  Pain Assessment  Pain Assessment: 0-10  Pain Score: 0 - No pain  Cognition:  Cognition  Orientation Level: A&Ox3 (VC's for date and name of hospital)       Postural Control:  Static Sitting Balance  Static Sitting-Level of Assistance: Close supervision (~10 mins)  Dynamic Sitting Balance  Dynamic Sitting-Balance:  (CGA)    Treatments:  Therapeutic Exercise  Therapeutic Exercise Performed: Yes  Therapeutic Exercise Activity 1: Seated BLE AROM/AAROM:AP, LAQ and supine AP, QS 1x10 reps. Cues for technques. Rest as needed    Bed Mobility  Bed Mobility: Yes  Bed Mobility 1  Bed Mobility 1: Supine to sitting  Level of Assistance 1: Moderate assistance, Moderate verbal cues  Bed Mobility Comments 1: HOB elevated  Bed Mobility 2  Bed Mobility  2: Sitting to supine  Level of Assistance 2: Moderate assistance, Moderate verbal cues  Bed Mobility 3  Bed Mobility 3: Rolling left, Rolling right  Level of Assistance 3: Moderate assistance    Transfers  Transfer: No (Attempted x2 from elevated EOB with walker, pt unable)    Outcome Measures:  [facility] Basic Mobility  Turning from your back to your side while in a flat bed without using bedrails: A lot  Moving from lying on your back to sitting on the side of a flat bed without using bedrails: A lot  Moving to and from bed to chair (including a wheelchair): Total  Standing up from a chair using your arms (e.g. wheelchair or bedside chair): Total  To walk in hospital room: Total  Climbing 3-5 steps with railing: Total  Basic Mobility - Total Score: 8    Education Documentation  Precautions, taught by Lianet Comer PT at 6/3/2024  2:50 PM.  Learner: Patient  Readiness:  Acceptance  Method: Explanation, Demonstration  Response: Verbalizes Understanding, Needs Reinforcement    Mobility Training, taught by Lianet Comer PT at 6/3/2024  2:50 PM.  Learner: Patient  Readiness: Acceptance  Method: Explanation, Demonstration  Response: Verbalizes Understanding, Needs Reinforcement    Education Comments  No comments found.        OP EDUCATION:       Encounter Problems       Encounter Problems (Active)       Balance       Pt will tolerate standing for 2 mins with [place] with walker support (Progressing)       Start:  05/28/24    Expected End:  06/10/24               Mobility       Pt will be [place] for ambulation 25 ft with RW (Progressing)       Start:  05/28/24    Expected End:  06/10/24            Pt will be [place] to ascend/descend 3 steps with 1 handrail (Progressing)       Start:  05/28/24    Expected End:  06/10/24               PT Transfers       Pt will be [place] for sit to stand and bed chair transfers with RW (Progressing)       Start:  05/28/24    Expected End:  06/10/24            Pt will be [place] for bed mobility (Progressing)       Start:  05/28/24    Expected End:  06/10/24

## 2024-06-04 LAB
ALBUMIN SERPL BCP-MCNC: 3 G/DL (ref 3.4–5)
ALP SERPL-CCNC: 58 U/L (ref 33–136)
ALT SERPL W P-5'-P-CCNC: 8 U/L (ref 10–52)
ANION GAP SERPL CALC-SCNC: 13 MMOL/L (ref 10–20)
AST SERPL W P-5'-P-CCNC: 24 U/L (ref 9–39)
BASOPHILS # BLD AUTO: 0 X10*3/UL (ref 0–0.1)
BASOPHILS NFR BLD AUTO: 0 %
BILIRUB SERPL-MCNC: 0.5 MG/DL (ref 0–1.2)
BUN SERPL-MCNC: 42 MG/DL (ref 6–23)
CALCIUM SERPL-MCNC: 8 MG/DL (ref 8.6–10.6)
CHLORIDE SERPL-SCNC: 104 MMOL/L (ref 98–107)
CO2 SERPL-SCNC: 26 MMOL/L (ref 21–32)
CREAT SERPL-MCNC: 3.35 MG/DL (ref 0.5–1.3)
EGFRCR SERPLBLD CKD-EPI 2021: 17 ML/MIN/1.73M*2
EOSINOPHIL # BLD AUTO: 0.26 X10*3/UL (ref 0–0.4)
EOSINOPHIL NFR BLD AUTO: 8 %
ERYTHROCYTE [DISTWIDTH] IN BLOOD BY AUTOMATED COUNT: 16.5 % (ref 11.5–14.5)
ERYTHROCYTE [DISTWIDTH] IN BLOOD BY AUTOMATED COUNT: 17.1 % (ref 11.5–14.5)
GLUCOSE SERPL-MCNC: 96 MG/DL (ref 74–99)
HAPTOGLOB SERPL-MCNC: 227 MG/DL (ref 37–246)
HCT VFR BLD AUTO: 21.1 % (ref 41–52)
HCT VFR BLD AUTO: 21.9 % (ref 41–52)
HGB BLD-MCNC: 6.5 G/DL (ref 13.5–17.5)
HGB BLD-MCNC: 7 G/DL (ref 13.5–17.5)
IMM GRANULOCYTES # BLD AUTO: 0.06 X10*3/UL (ref 0–0.5)
IMM GRANULOCYTES NFR BLD AUTO: 1.8 % (ref 0–0.9)
LYMPHOCYTES # BLD AUTO: 0.23 X10*3/UL (ref 0.8–3)
LYMPHOCYTES NFR BLD AUTO: 7.1 %
MAGNESIUM SERPL-MCNC: 1.92 MG/DL (ref 1.6–2.4)
MCH RBC QN AUTO: 28.6 PG (ref 26–34)
MCH RBC QN AUTO: 28.6 PG (ref 26–34)
MCHC RBC AUTO-ENTMCNC: 30.8 G/DL (ref 32–36)
MCHC RBC AUTO-ENTMCNC: 32 G/DL (ref 32–36)
MCV RBC AUTO: 89 FL (ref 80–100)
MCV RBC AUTO: 93 FL (ref 80–100)
MONOCYTES # BLD AUTO: 0.22 X10*3/UL (ref 0.05–0.8)
MONOCYTES NFR BLD AUTO: 6.7 %
NEUTROPHILS # BLD AUTO: 2.49 X10*3/UL (ref 1.6–5.5)
NEUTROPHILS NFR BLD AUTO: 76.4 %
NRBC BLD-RTO: 0 /100 WBCS (ref 0–0)
NRBC BLD-RTO: 0 /100 WBCS (ref 0–0)
PHOSPHATE SERPL-MCNC: 4.5 MG/DL (ref 2.5–4.9)
PLATELET # BLD AUTO: 94 X10*3/UL (ref 150–450)
PLATELET # BLD AUTO: 95 X10*3/UL (ref 150–450)
POTASSIUM SERPL-SCNC: 4.3 MMOL/L (ref 3.5–5.3)
PROT SERPL-MCNC: 5.7 G/DL (ref 6.4–8.2)
RBC # BLD AUTO: 2.27 X10*6/UL (ref 4.5–5.9)
RBC # BLD AUTO: 2.45 X10*6/UL (ref 4.5–5.9)
SODIUM SERPL-SCNC: 139 MMOL/L (ref 136–145)
WBC # BLD AUTO: 3.3 X10*3/UL (ref 4.4–11.3)
WBC # BLD AUTO: 3.5 X10*3/UL (ref 4.4–11.3)

## 2024-06-04 PROCEDURE — 85027 COMPLETE CBC AUTOMATED: CPT

## 2024-06-04 PROCEDURE — 2500000002 HC RX 250 W HCPCS SELF ADMINISTERED DRUGS (ALT 637 FOR MEDICARE OP, ALT 636 FOR OP/ED): Mod: MUE

## 2024-06-04 PROCEDURE — 36430 TRANSFUSION BLD/BLD COMPNT: CPT

## 2024-06-04 PROCEDURE — 36415 COLL VENOUS BLD VENIPUNCTURE: CPT

## 2024-06-04 PROCEDURE — 2500000004 HC RX 250 GENERAL PHARMACY W/ HCPCS (ALT 636 FOR OP/ED): Performed by: STUDENT IN AN ORGANIZED HEALTH CARE EDUCATION/TRAINING PROGRAM

## 2024-06-04 PROCEDURE — 99233 SBSQ HOSP IP/OBS HIGH 50: CPT

## 2024-06-04 PROCEDURE — 2500000001 HC RX 250 WO HCPCS SELF ADMINISTERED DRUGS (ALT 637 FOR MEDICARE OP): Performed by: STUDENT IN AN ORGANIZED HEALTH CARE EDUCATION/TRAINING PROGRAM

## 2024-06-04 PROCEDURE — P9047 ALBUMIN (HUMAN), 25%, 50ML: HCPCS | Mod: JZ

## 2024-06-04 PROCEDURE — 80053 COMPREHEN METABOLIC PANEL: CPT

## 2024-06-04 PROCEDURE — 2500000004 HC RX 250 GENERAL PHARMACY W/ HCPCS (ALT 636 FOR OP/ED): Mod: JZ

## 2024-06-04 PROCEDURE — 1170000001 HC PRIVATE ONCOLOGY ROOM DAILY

## 2024-06-04 PROCEDURE — 2500000005 HC RX 250 GENERAL PHARMACY W/O HCPCS: Performed by: STUDENT IN AN ORGANIZED HEALTH CARE EDUCATION/TRAINING PROGRAM

## 2024-06-04 PROCEDURE — 85025 COMPLETE CBC W/AUTO DIFF WBC: CPT

## 2024-06-04 PROCEDURE — 2500000001 HC RX 250 WO HCPCS SELF ADMINISTERED DRUGS (ALT 637 FOR MEDICARE OP)

## 2024-06-04 PROCEDURE — 84100 ASSAY OF PHOSPHORUS: CPT | Performed by: STUDENT IN AN ORGANIZED HEALTH CARE EDUCATION/TRAINING PROGRAM

## 2024-06-04 PROCEDURE — C9113 INJ PANTOPRAZOLE SODIUM, VIA: HCPCS | Performed by: STUDENT IN AN ORGANIZED HEALTH CARE EDUCATION/TRAINING PROGRAM

## 2024-06-04 PROCEDURE — 83735 ASSAY OF MAGNESIUM: CPT | Performed by: STUDENT IN AN ORGANIZED HEALTH CARE EDUCATION/TRAINING PROGRAM

## 2024-06-04 PROCEDURE — P9040 RBC LEUKOREDUCED IRRADIATED: HCPCS

## 2024-06-04 RX ORDER — FERROUS SULFATE 325(65) MG
65 TABLET ORAL
Status: DISCONTINUED | OUTPATIENT
Start: 2024-06-05 | End: 2024-06-11 | Stop reason: HOSPADM

## 2024-06-04 RX ADMIN — Medication 3 L/MIN: at 20:00

## 2024-06-04 RX ADMIN — NYSTATIN 1 APPLICATION: 100000 POWDER TOPICAL at 08:34

## 2024-06-04 RX ADMIN — TAMSULOSIN HYDROCHLORIDE 0.4 MG: 0.4 CAPSULE ORAL at 08:32

## 2024-06-04 RX ADMIN — NYSTATIN 1 APPLICATION: 100000 POWDER TOPICAL at 22:02

## 2024-06-04 RX ADMIN — Medication 2 L/MIN: at 08:00

## 2024-06-04 RX ADMIN — ROSUVASTATIN CALCIUM 10 MG: 10 TABLET, FILM COATED ORAL at 22:02

## 2024-06-04 RX ADMIN — PANTOPRAZOLE SODIUM 40 MG: 40 INJECTION, POWDER, FOR SOLUTION INTRAVENOUS at 22:02

## 2024-06-04 RX ADMIN — DEXTROMETHORPHAN 30 MG: 30 SUSPENSION, EXTENDED RELEASE ORAL at 08:32

## 2024-06-04 RX ADMIN — DEXTROMETHORPHAN 30 MG: 30 SUSPENSION, EXTENDED RELEASE ORAL at 22:03

## 2024-06-04 RX ADMIN — ALBUMIN HUMAN 25 G: 0.25 SOLUTION INTRAVENOUS at 06:36

## 2024-06-04 RX ADMIN — Medication 5 MG: at 22:22

## 2024-06-04 RX ADMIN — PANTOPRAZOLE SODIUM 40 MG: 40 INJECTION, POWDER, FOR SOLUTION INTRAVENOUS at 08:32

## 2024-06-04 RX ADMIN — CEPHALEXIN 500 MG: 500 CAPSULE ORAL at 06:36

## 2024-06-04 RX ADMIN — ALBUMIN HUMAN 25 G: 0.25 SOLUTION INTRAVENOUS at 17:46

## 2024-06-04 RX ADMIN — BENZONATATE 100 MG: 100 CAPSULE ORAL at 06:36

## 2024-06-04 RX ADMIN — OXYCODONE HYDROCHLORIDE AND ACETAMINOPHEN 1000 MG: 500 TABLET ORAL at 08:32

## 2024-06-04 RX ADMIN — METOPROLOL SUCCINATE 150 MG: 50 TABLET, EXTENDED RELEASE ORAL at 08:43

## 2024-06-04 RX ADMIN — ASPIRIN 81 MG: 81 TABLET, COATED ORAL at 22:12

## 2024-06-04 RX ADMIN — ALBUMIN HUMAN 25 G: 0.25 SOLUTION INTRAVENOUS at 12:31

## 2024-06-04 ASSESSMENT — COGNITIVE AND FUNCTIONAL STATUS - GENERAL
TOILETING: A LOT
MOVING FROM LYING ON BACK TO SITTING ON SIDE OF FLAT BED WITH BEDRAILS: A LOT
PERSONAL GROOMING: A LOT
DRESSING REGULAR LOWER BODY CLOTHING: A LOT
MOVING FROM LYING ON BACK TO SITTING ON SIDE OF FLAT BED WITH BEDRAILS: A LOT
MOBILITY SCORE: 9
MOVING TO AND FROM BED TO CHAIR: A LOT
TOILETING: A LOT
TURNING FROM BACK TO SIDE WHILE IN FLAT BAD: A LOT
STANDING UP FROM CHAIR USING ARMS: TOTAL
WALKING IN HOSPITAL ROOM: TOTAL
CLIMB 3 TO 5 STEPS WITH RAILING: TOTAL
DRESSING REGULAR LOWER BODY CLOTHING: A LOT
TURNING FROM BACK TO SIDE WHILE IN FLAT BAD: A LOT
MOVING TO AND FROM BED TO CHAIR: A LOT
MOVING FROM LYING ON BACK TO SITTING ON SIDE OF FLAT BED WITH BEDRAILS: A LOT
HELP NEEDED FOR BATHING: A LOT
EATING MEALS: A LOT
MOVING TO AND FROM BED TO CHAIR: A LOT
TURNING FROM BACK TO SIDE WHILE IN FLAT BAD: A LOT
DRESSING REGULAR UPPER BODY CLOTHING: A LOT
PERSONAL GROOMING: A LOT
CLIMB 3 TO 5 STEPS WITH RAILING: TOTAL
HELP NEEDED FOR BATHING: A LOT
DAILY ACTIVITIY SCORE: 12
DRESSING REGULAR UPPER BODY CLOTHING: A LOT
EATING MEALS: A LITTLE
MOBILITY SCORE: 9
WALKING IN HOSPITAL ROOM: TOTAL
MOBILITY SCORE: 9
STANDING UP FROM CHAIR USING ARMS: TOTAL
DAILY ACTIVITIY SCORE: 13
STANDING UP FROM CHAIR USING ARMS: TOTAL
CLIMB 3 TO 5 STEPS WITH RAILING: TOTAL
WALKING IN HOSPITAL ROOM: TOTAL

## 2024-06-04 ASSESSMENT — PAIN SCALES - GENERAL
PAINLEVEL_OUTOF10: 0 - NO PAIN
PAINLEVEL_OUTOF10: 0 - NO PAIN

## 2024-06-04 ASSESSMENT — PAIN - FUNCTIONAL ASSESSMENT: PAIN_FUNCTIONAL_ASSESSMENT: 0-10

## 2024-06-04 ASSESSMENT — ACTIVITIES OF DAILY LIVING (ADL): LACK_OF_TRANSPORTATION: NO

## 2024-06-04 NOTE — SIGNIFICANT EVENT
Discussed with Patient's wife risks and benefits of anticoagulation,   As patient is high risk for thrombosis being bedridden hospitalized, with Afib\A flutter  and Hx of DVT along with basal cell ca on face, s/p removal and radiation of malignant mesothelioma.   At the same time he is at high risk of bleeding with thrombocytopenia of 97, low HGB of 6.5 today.  Decision made - wife- on holding anticoagulation for now , keeping SCD and monitor his CBC daily while at hospital.

## 2024-06-04 NOTE — SIGNIFICANT EVENT
RAPID RESPONSE RN NOTE- RADAR 6   06/04/24 0900   Onset Documentation   Rapid Response Initiated By Radar auto page   Location/Room [facility]  ([facility] 1990)   Pager Time [phone]   Arrival Time 0930   Event End Time 0940   Level II Called No   Primary Reason for Call Radar auto page  (RADAR 6)     VS: 37.1, 98, 22, 107/70, 98%  Reviewed VS with Nia RN - BP rechecked, consistent with trend for patient.   No acute concerns from Nursing at this time.  RN to contact Rapid Response Team with any further issues or patient deterioration.

## 2024-06-04 NOTE — PROGRESS NOTES
06/04/24 1400   Discharge Planning   Living Arrangements Spouse/significant other   Support Systems Spouse/significant other;Children   Type of Residence Private residence   Number of Stairs to Enter Residence 3   Do you have animals or pets at home? No   Who is requesting discharge planning? Provider   Home or Post Acute Services Post acute facilities (Rehab/SNF/etc)   Type of Post Acute Facility Services Skilled nursing   Patient expects to be discharged to: SNF   Does the patient need discharge transport arranged? Yes   RoundTrip coordination needed? Yes   Financial Resource Strain   How hard is it for you to pay for the very basics like food, housing, medical care, and heating? Not very   Housing Stability   In the last 12 months, was there a time when you were not able to pay the mortgage or rent on time? N   In the last 12 months, how many places have you lived? 1   In the last 12 months, was there a time when you did not have a steady place to sleep or slept in a shelter (including now)? N   Transportation Needs   In the past 12 months, has lack of transportation kept you from medical appointments or from getting medications? no   In the past 12 months, has lack of transportation kept you from meetings, work, or from getting things needed for daily living? No     Pt has been accepted at [facility].  SW relayed acceptance to pt and spouse.  SW will assist with transfer when medically stable.  Will follow.  MICHELLE Vera

## 2024-06-04 NOTE — PROGRESS NOTES
Physical Therapy                 Therapy Communication Note    Patient Name: Vinicius Arriola  MRN: [id number]  Today's Date: 6/4/2024     Discipline: Physical Therapy    Missed Visit Reason: Missed Visit Reason: Patient placed on medical hold (Pt with low H/H (6.5 and 21.1). Will re-attempt as appropriate.)    Missed Time: Attempt

## 2024-06-04 NOTE — PROGRESS NOTES
"Subjective     Overnight events:  NAEO, evaluated with wife present and answered questions during rounds. Updated the family on discussion with GI. Pt denies any cardiopulmonary sxs at rest, subj. Fevers, chills, n/v.     Objective   Vital signs:  Blood pressure 107/67, pulse 105, temperature 36.7 °C (98.1 °F), temperature source Temporal, resp. rate 18, height 1.727 m (5' 7.99\"), weight 118 kg (260 lb 5.8 oz), SpO2 97%.    I/O last 3 completed shifts:  In: 3565.7 (30.2 mL/kg) [P.O.:300; I.V.:380.3 (3.2 mL/kg); Blood:2885.4]  Out: 1375 (11.6 mL/kg) [Urine:1375 (0.3 mL/kg/hr)]  Weight: 118.1 kg     Physical Exam  Constitutional:       General: He is not in acute distress.  Eyes:      Extraocular Movements: Extraocular movements intact.   Pulmonary:      Effort: Pulmonary effort is normal.      Breath sounds: No wheezing, rhonchi or rales.      Comments: Decreased breath sounds on left  Abdominal:      General: Bowel sounds are normal. There is distension.      Palpations: Abdomen is soft.      Tenderness: There is no abdominal tenderness.   Musculoskeletal:      Comments: Bilat LE edema 1+   Neurological:      General: No focal deficit present.      Mental Status: He is oriented to person, place, and time.         Relevant Results        Labs:  Last CBC:  Lab Results   Component Value Date    WBC 3.3 (L) 06/04/2024    HGB 6.5 (LL) 06/04/2024    HCT 21.1 (L) 06/04/2024    MCV 93 06/04/2024    PLT 95 (L) 06/04/2024       Last RFP:  Lab Results   Component Value Date    GLUCOSE 96 06/04/2024    CALCIUM 8.0 (L) 06/04/2024     06/04/2024    K 4.3 06/04/2024    CO2 26 06/04/2024     06/04/2024    BUN 42 (H) 06/04/2024    CREATININE 3.35 (H) 06/04/2024       Last LFTs:  Lab Results   Component Value Date    ALT 8 (L) 06/04/2024    AST 24 06/04/2024    ALKPHOS 58 06/04/2024    BILITOT 0.5 06/04/2024       Last coags:  Lab Results   Component Value Date    INR 1.1 05/26/2024    APTT 28 05/30/2024 "       Micro/culture data:  No results found for the last 90 days.      Imaging:  ECG 12 Lead  Atrial flutter with variable AV block with premature ventricular or aberrantly conducted complexes  Low voltage QRS  Abnormal ECG  When compared with ECG of 28-MAY-2024 01:40,  Atrial flutter has replaced Atrial fibrillation  Nonspecific T wave abnormality, improved in Inferior leads  Confirmed by Jovanny Loera ([id number]) on 5/31/2024 1:51:33 PM  Flexible Sigmoidoscopy  Table formatting from the original result was not included.  Impression  Dark red blood visualized in the rectum. Blood was cleared without any   active bleeding appreciated nor underlying mucosal changes.   Few small and large, scattered diverticula with no inflammation in the   sigmoid colon; no bleeding was identified. Diverticula were irrigated   without any bleeding or stigmata of recent bleed appreciated.  All observed locations appeared normal, including the sigmoid colon,   rectosigmoid and rectum. No mucosal abnormalities. Normal on retroflexion.  Green bilious stool proximal to the rectum, most notably in the proximal   descending colon and transverse colon, without any blood, blood clots or   hematin.    Findings  Dark red blood visualized in the rectum. Blood was cleared without any   active bleeding appreciated nor underlying mucosal changes.   Few small and large, scattered diverticula with no inflammation in the   sigmoid colon; no bleeding was identified. Diverticula were irrigated   without any bleeding or stigmata of recent bleed appreciated.  All observed locations appeared normal, including the sigmoid colon,   rectosigmoid and rectum. No mucosal abnormalities. Normal on retroflexion.  Green bilious stool proximal to the rectum, most notably in the proximal   descending colon and transverse colon, without any blood, blood clots or   hematin.    Recommendation   Follow up with primary gastroenterologist    Follow up with inpatient GI consult  service, Dr. Valenzuela and Dr. Archibald  Continue on IV PPI BID        Indication  Iron deficiency anemia due to chronic blood loss    Staff  Staff Role   Kaylen Valenzuela MD Proceduralist   Ale Archibald MD    Medications  micafungin (Mycamine) 100 mg in dextrose 5% 100 mL IV 95.24 mg*    *From user-documented volume   fentaNYL PF (Sublimaze) injection  - Omnicell Override Pull Cannot be   calculated*    *Administration dose not documented   midazolam (Versed) injection  - Omnicell Override Pull Cannot be   calculated*    *Administration dose not documented   (Totals for administrations occurring from 1228 to 1404 on 05/24/24)     Preprocedure  A history and physical has been performed, and patient medication   allergies have been reviewed. The patient's tolerance of previous   anesthesia has been reviewed. The risks and benefits of the procedure and   the sedation options and risks were discussed with the patient. All   questions were answered and informed consent obtained.    Details of the Procedure  The patient underwent no sedation. The patient's blood pressure, ECG,   heart rate, level of consciousness, oxygen and respirations were monitored   throughout the procedure. A digital rectal exam was performed. A perianal   exam was performed. The scope was introduced through the anus and advanced   to the transverse colon. Retroflexion was performed in the rectum. The   quality of bowel preparation was evaluated using the Dows Bowel   Preparation Scale with scores of: left colon = 3. Bowel prep was not   adequate. The patient experienced no blood loss. The procedure was not   difficult. The patient tolerated the procedure well. There were no   apparent adverse events.     Events  Procedure Events   Event Event Time   ENDO SCOPE IN TIME 5/24/2024  1:08 PM   ENDO SCOPE OUT TIME 5/24/2024  1:26 PM     Specimens  No specimens collected    Procedure Location  [facility]  [facility]  [address] [address] [facility] [address]  [phone]    Referring Provider  Grayson Nichols, APRN-CNP  [address] [address]    Procedure Provider  Ale Archibald MD  Lower extremity venous duplex left  Narrative: Interpreted By:  Nathan Argueta and Weaver Jakob   STUDY:  [facility] US LOWER EXTREMITY VENOUS DUPLEX LEFT;  5/30/2024 3:58 pm      INDICATION:  Signs/Symptoms:LLE swelling, eval for DVT.      COMPARISON:  None.      ACCESSION NUMBER(S):  [id number]      ORDERING CLINICIAN:  ALEXANDRA AVILA      TECHNIQUE:  Vascular ultrasound of the left lower extremity was performed.  Real-time compression views as well as Gray scale, color Doppler and  spectral Doppler waveform analysis was performed.      FINDINGS:  Evaluation of the visualized portions of the left common femoral  vein, proximal, mid, and distal femoral vein, and popliteal vein were  performed.  Evaluation of the visualized portions of the  posterior  tibial and peroneal veins were also performed. Evaluation of the calf  veins limited due to edema.      Echogenic intraluminal material in the proximal greater saphenous  vein with partial compressibility and color Doppler signal The  evaluated veins demonstrate normal compressibility. There is intact  venous flow demonstrating normal respiratory variability and normal  augmentation of flow with calf compression. Therefore, there is no  ultrasonographic evidence for deep vein thrombosis within the  evaluated veins.      Impression: 1.  No sonographic evidence for deep vein thrombosis within the  evaluated veins of the left lower extremity.  2. Nonocclusive thrombus of the proximal greater saphenous vein, a  superficial vein. Recommend correlation with concern for superficial  thrombophlebitis.      I personally reviewed the images/study and I agree with the findings  as stated by Agustín Cheatham MD. This study was interpreted at  [facility]  [place], [place].      MACRO:  None      Signed by: Nathan Argueta 5/31/2024 5:32 AM  Dictation workstation:   [id number]         Assessment/Plan   Principal Problem:    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Multi)  Active Problems:    Anemia    Vinicius Arriola is a 88 y.o. male with PMHx of PAD, afib/flutter, hx DVT on warfarin, HTN, CAD s/p stent, mitral regurg, HLD, polycythemia vera, managed by Dr. Rothman (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 Presented to [facility] 5/14 with abdominal pain, fever, diarrhea, urinary retention c/f sepsis presumed 2/2 PNA; CT CAP showed known LLL collapse 2/2 mesothelioma, new post L chest wall invasion, and c/f L pulmonary trunk tumor thrombus.  5/23 developed shock and acute hypoxic resp failure requiring MICU transfer for pressors and NIV (airvo), thought 2/2 GI bleeding with melena and coffee ground drainage from NGT. Quickly weaned off pressors, s/p flex sig with blood in rectum but no active bleed. Now transferred back to floor for further mgmt.      Updates:   - PT/OT repeat recommend SNF  - 1u pRBC, CTM  - Will restart Iron supplementation  - GI without plan for acute intervention.  - Will continue Albumin challenge, if no improvement of CrCl will consult nephrology.      #History Afib, HLD, CAD, PAD  #History DVT/PE on Warfarin  :: 5/21 TTE showing:  Left ventricular systolic function is hyperdynamic with a 70-75% estimated ejection fraction, Mildly elevated RVSP, Moderately dilated aortic root   Continues to have episodes of Afib w/ RVR occasional Aflut likely multifactorial  - Metop tartrate at 50mg TID > Metop Succinate 150 mg qd  - Holding warfarin, aspirin  - Continue home statin  - D/c Heparin ggt      #Anemia  :: Likely 2/2 GIB noted this admission  :: Melena and coffee grounds via NGT noted 5/22  :: Hgb  "initially 10, decreased to 6.9 on 5/23 (1u pRBC) > slow decrease again to 6.8 on 6/2 (1u pRBC)  :: Fe/TIBC (64/175 & 37%), B12 (1287), B9 (11), Hapto (pending)  - Continue to trend Hgb, platelets goal > 20. Platelet goal liberalized as no active bleeding, if hgb decreasing or bleeding clinically then transfuse to goal 50  - Anemia workup revels Mixed picture KAY and anemia of chronic disease  - Will restart home Iron supplementation ( mg daily)  - GI consulted, no plan for acute intervention.     #Worsening mesothelioma  #Tumor Thrombus  #Mesothelioma with Loculated Pleural Effusions   #Left Hilar Mass, Left Pulmonary Trunk Mass  #History JOVANNI, Pulmonary Embolism  :: primary oncologist Dr. Galvan  :: s/p L VATS w/ decort 5/2022, XRT 9-10/22  :: s/p \"half-dose\" pemfexy on 5/14  :: CT chest 5/18 with significant collapse of left lung, loculated pleural effusions consistent with patient's known mesothelioma.  Also shows ill-defined patchy infiltrate in right lower lobe.  Shows left hilar mass with extension into left pulmonary trunk likely tumor thrombus   - Holding warfarin, continue to monitor   - Dr. Henderson recommends transitioning to Eliquis on discharge, deferring anticoagulation iso thrombocytopenia. Plt stable above 50, resume AC (Heparin ggt; low intensity); plan to transition to Eliquis 2.5mg BID (if tolerating heparin...)  - Continuing protocol w/ 1 mg folic acid daily  - Hospice Consult for informational meeting 5/31. Not currently interested in pursuing hospice.    #Tachypnea   #Elevated A-a gradient   #AHRF (Improved)  :: multifactorial, like d/t shunt secondary to malignancy vs deconditioning given prolonged admission vs intermittent Afib vs v/q mismatch   :: RR 23-44   :: 5/22 ABG: pH 7.53, pCO2 31, pO2 109, Lactate 2.2, FiO2 40.  - On 4L nasal cannula previously requiring intermittent AIRVO to assist with work of breathing.   - gradually wean supplemental O2 for SpO2 goal >90%   - continue abx " regimen  - CTM     #Coffee grounds via NGT (placed 5/21), improved   #Melena likely d/t GI bleed  :: KUB 5/21, showing moderate distention of stomach with nonspecific predominantly fluid-filled bowel pattern.  :: 5/20-5/21 patient emesis that is watery, dark brown, malodorous   :: did initially present w/ diarrhea could have been chemo induced vs stool ball  :: INR 4.9>6.3>2.4 >1.2  :: s/p Vitamin K 10 mg  :: KUB w/ dilated bowel loops   - Soft diet, encouraged patient to go slow  - holding ferrous sulfate    - pantoprazole 40 bid   - GI consulted, appreciate recs  -s/p flex sig with no evidence of active bleed              -Will hold off EGD since coffee ground emesis resolved     #Illeus (improved/resolved)  :: KUB 5/26 kub shows distended bowel loops likely ileus, will monitor for improvement with PO diet.   - NG tube clamped; If becoming nauseous or developing vomiting, can unclamp NGT and place to LIWS.  - Tolerating PO and having BM. Will CTM     #Pneumonia, Right lower lobe ill-defined patchy infiltrate (resolved)  :: Resp Cult salivary contamination  :: MRSA nares: negative  :: Strep/Legionella antigens: negative  :: CXR 5/21 showing persistent patchy right infrahilar airspace opacities; improved on serial CXRs  :: 5/22 CT CAP w/o contrast: Interval improvement of the right lower lobe patchy infiltrate  :: s/p Azithro (5/16-5/18)  :: s/p vanc (5/16-5/18) (5/20-5/22) and zosyn (5/16-5/18) (5/20-22)  -eropenem (5/19-5/20) (5/22-25) and micafungin (5/22-25)  -Abx stopped 5/25 given prolonged course with improvement/decline independent of abx, do not suspect active infection currently.       #Fever  #Sepsis most likely d/t pneumonia  :: at OSH met SIRS criteria (febrile, tachypnea)  :: UA 5/18 with small LE, 3+ bacteria, urine cx 5/21 negative  :: Resp Cult salivary contamination  :: MRSA nares: negative  :: Strep/Legionella antigens: negative  :: c diff and enteric stool panel: negative  :: BC 5/16 NGTD, repeat  BC 5/20 NGTD  :: CRP 30.46>42.36  :: CXR 5/21 showing persistent patchy right infrahilar airspace opacities    :: s/p Azithro (5/16-5/18)  :: asymmetric warmth, erythema, edema noted in left lower extremity   :: s/p vanc (5/16-5/18) (5/20-5/25) and zosyn (5/16-5/18) (5/20-22)  :: 5/22 CT CAP w/o contrast: Interval improvement of the right lower lobe patchy infiltrate, Interval flattening of the inferior vena cava which may be seen with hypovolemia, Improved proximal sigmoid diverticulitis.  - repeat blood cultures drawn 5/22, NGTD   - meropenem (5/19-5/20) (5/22-5/25) and micafungin (5/22-5/25); restarted Vanc/Sofy briefly overnight 5/27.     #Lower leg skin changes likely d/t cellulitis   :: previously improved margins of left lower extremity skin changes  - Margins worsened on morning exam 5/30, tender to palpation, slightly warmer than adjacent leg.   - CTM  - Keflex 500 q8 5 day course for cellulitis (5/30-6/3)     #VIK on CKD, likely prerenal d/t fluid status   # CKD (baseline Cr 1.3-1.4)  # urine retention s/p Boyd Catheter Placement by Urology in OSH  Estimated Creatinine Clearance: 19.7 mL/min (A) (by C-G formula based on SCr of 3.35 mg/dL (H)).  :: US renal 5/16: Nonobstructing 1 cm left renal calculus, no hydronephrosis  :: Cr 1.64>2.26>3.00 (Baseline Cr 1.3-1.4)  :: Urine electrolytes pre-renal, Net negative, hypernatremia with prolonged NPO all suggest pre-renal hypovolemic VIK. Will encourage PO intake  - will eventually need boyd removal and trial  - strict in/out  - tamsulosin 0.4 mg daily  -minimize nephrotoxic medications as able  - boyd removed 5/23/24 (5/16-5/23)  - Albumin 25 QID (2days), if no improvement in clearance will consult Nephrology.    #Pancytopenia, most likely medication induced from pemfexy, resolved  #Thrombocytopenia  :: c/f TTP  ::   :: s/p pemfexy 5/14  :: d dimer 1521, fibrinogen >1000- no c/f dic  :: no schistocytes on smear  ::   :: filgrastim 480 mcg  (5/21-5/22)  - 5/23 ANC 4130   - haptoglobin 400   - Platelets 55>38, ctm. Goal >10  - s/p 1u platelets 5/23 (was bleeding at the time)     #Elevated INR  :: INR 4.9>6.3>2.4 >1.2  :: s/p Vitamin K 10 mg  -CTM    # Hospital-acquired Delirium  :: waxing and waning altered mental status  -Delirium precautions  -hold home trazodone for insomnia      Antibiotics:: none  Dispo: SNF.      F: Replete PRN  E: Replete PRN  N: Regular Diet  DVT Ppx: None iso of low Plt  GI Ppx: Pantoprazole   Access/Lines: PIV x3  Forrest External (5/23- )  Abx: None  O2: 2L NC    Pain regimen: Tylenol   GI Laxative: None     Code status: DNR and No Intubation  Emergency contact:Kirsten Arriola (Spouse) [phone]        Ramesh Conley MD  PGY-1 Internal Medicine

## 2024-06-04 NOTE — CARE PLAN
Problem: Fall/Injury  Goal: Pace activities to prevent fatigue by end of the shift  6/4/2024 0459 by Christiana Blake RN  Outcome: Progressing  6/4/2024 0457 by Christiana Blake RN  Outcome: Progressing     Problem: Skin  Goal: Prevent/manage excess moisture  Recent Flowsheet Documentation  Taken 6/4/2024 0459 by Christiana Blake RN  Prevent/manage excess moisture:   Cleanse incontinence/protect with barrier cream   Moisturize dry skin   Monitor for/manage infection if present  Goal: Prevent/minimize sheer/friction injuries  Recent Flowsheet Documentation  Taken 6/4/2024 0459 by Christiana Blake RN  Prevent/minimize sheer/friction injuries: Use pull sheet  Goal: Promote/optimize nutrition  6/4/2024 0459 by Christiana Blake RN  Outcome: Progressing  6/4/2024 0457 by Christiana Blake RN  Outcome: Progressing  Flowsheets (Taken 6/4/2024 0457)  Promote/optimize nutrition:   Monitor/record intake including meals   Offer water/supplements/favorite foods   Consume > 50% meals/supplements  Goal: Promote skin healing  Recent Flowsheet Documentation  Taken 6/4/2024 0459 by Christiana Blake RN  Promote skin healing:   Assess skin/pad under line(s)/device(s)   Turn/reposition every 2 hours/use positioning/transfer devices  Goal: Decreased wound size/increased tissue granulation at next dressing change  Outcome: Progressing  Goal: Participates in plan/prevention/treatment measures  Outcome: Progressing  Flowsheets (Taken 6/4/2024 0459)  Participates in plan/prevention/treatment measures: Elevate heels  Goal: Prevent/manage excess moisture  Outcome: Progressing  Flowsheets (Taken 6/4/2024 0459)  Prevent/manage excess moisture:   Cleanse incontinence/protect with barrier cream   Moisturize dry skin   Monitor for/manage infection if present  Goal: Prevent/minimize sheer/friction injuries  Outcome: Progressing  Flowsheets (Taken 6/4/2024 0459)  Prevent/minimize sheer/friction injuries: Use pull sheet  Goal: Promote skin healing  Outcome:  Progressing  Flowsheets (Taken 6/4/2024 4009)  Promote skin healing:   Assess skin/pad under line(s)/device(s)   Turn/reposition every 2 hours/use positioning/transfer devices

## 2024-06-05 LAB
ABO GROUP (TYPE) IN BLOOD: NORMAL
ALBUMIN SERPL BCP-MCNC: 3.6 G/DL (ref 3.4–5)
ALP SERPL-CCNC: 53 U/L (ref 33–136)
ALT SERPL W P-5'-P-CCNC: 10 U/L (ref 10–52)
ANION GAP SERPL CALC-SCNC: 15 MMOL/L (ref 10–20)
ANTIBODY SCREEN: NORMAL
AST SERPL W P-5'-P-CCNC: 24 U/L (ref 9–39)
BASOPHILS # BLD AUTO: 0 X10*3/UL (ref 0–0.1)
BASOPHILS NFR BLD AUTO: 0 %
BILIRUB SERPL-MCNC: 0.5 MG/DL (ref 0–1.2)
BLOOD EXPIRATION DATE: NORMAL
BUN SERPL-MCNC: 46 MG/DL (ref 6–23)
CALCIUM SERPL-MCNC: 8.4 MG/DL (ref 8.6–10.6)
CHLORIDE SERPL-SCNC: 104 MMOL/L (ref 98–107)
CO2 SERPL-SCNC: 23 MMOL/L (ref 21–32)
CREAT SERPL-MCNC: 3.54 MG/DL (ref 0.5–1.3)
DISPENSE STATUS: NORMAL
EGFRCR SERPLBLD CKD-EPI 2021: 16 ML/MIN/1.73M*2
EOSINOPHIL # BLD AUTO: 0.27 X10*3/UL (ref 0–0.4)
EOSINOPHIL NFR BLD AUTO: 7.5 %
ERYTHROCYTE [DISTWIDTH] IN BLOOD BY AUTOMATED COUNT: 16.6 % (ref 11.5–14.5)
GLUCOSE SERPL-MCNC: 122 MG/DL (ref 74–99)
HCT VFR BLD AUTO: 21.4 % (ref 41–52)
HGB BLD-MCNC: 7 G/DL (ref 13.5–17.5)
HGB RETIC QN: 35 PG (ref 28–38)
IMM GRANULOCYTES # BLD AUTO: 0.08 X10*3/UL (ref 0–0.5)
IMM GRANULOCYTES NFR BLD AUTO: 2.2 % (ref 0–0.9)
IMMATURE RETIC FRACTION: 6.7 %
LYMPHOCYTES # BLD AUTO: 0.26 X10*3/UL (ref 0.8–3)
LYMPHOCYTES NFR BLD AUTO: 7.2 %
MAGNESIUM SERPL-MCNC: 1.93 MG/DL (ref 1.6–2.4)
MCH RBC QN AUTO: 28.1 PG (ref 26–34)
MCHC RBC AUTO-ENTMCNC: 32.7 G/DL (ref 32–36)
MCV RBC AUTO: 86 FL (ref 80–100)
MONOCYTES # BLD AUTO: 0.19 X10*3/UL (ref 0.05–0.8)
MONOCYTES NFR BLD AUTO: 5.3 %
NEUTROPHILS # BLD AUTO: 2.79 X10*3/UL (ref 1.6–5.5)
NEUTROPHILS NFR BLD AUTO: 77.8 %
NRBC BLD-RTO: 0 /100 WBCS (ref 0–0)
PHOSPHATE SERPL-MCNC: 4.5 MG/DL (ref 2.5–4.9)
PLATELET # BLD AUTO: 90 X10*3/UL (ref 150–450)
POTASSIUM SERPL-SCNC: 4.3 MMOL/L (ref 3.5–5.3)
PRODUCT BLOOD TYPE: 6200
PRODUCT CODE: NORMAL
PROT SERPL-MCNC: 5.7 G/DL (ref 6.4–8.2)
RBC # BLD AUTO: 2.49 X10*6/UL (ref 4.5–5.9)
RETICS #: 0.01 X10*6/UL (ref 0.02–0.11)
RETICS/RBC NFR AUTO: 0.3 % (ref 0.5–2)
RH FACTOR (ANTIGEN D): NORMAL
SODIUM SERPL-SCNC: 138 MMOL/L (ref 136–145)
UNIT ABO: NORMAL
UNIT NUMBER: NORMAL
UNIT RH: NORMAL
UNIT VOLUME: 350
WBC # BLD AUTO: 3.6 X10*3/UL (ref 4.4–11.3)
XM INTEP: NORMAL

## 2024-06-05 PROCEDURE — 80053 COMPREHEN METABOLIC PANEL: CPT

## 2024-06-05 PROCEDURE — P9047 ALBUMIN (HUMAN), 25%, 50ML: HCPCS | Mod: JZ

## 2024-06-05 PROCEDURE — 99233 SBSQ HOSP IP/OBS HIGH 50: CPT

## 2024-06-05 PROCEDURE — 83735 ASSAY OF MAGNESIUM: CPT | Performed by: STUDENT IN AN ORGANIZED HEALTH CARE EDUCATION/TRAINING PROGRAM

## 2024-06-05 PROCEDURE — 36415 COLL VENOUS BLD VENIPUNCTURE: CPT

## 2024-06-05 PROCEDURE — 2500000001 HC RX 250 WO HCPCS SELF ADMINISTERED DRUGS (ALT 637 FOR MEDICARE OP)

## 2024-06-05 PROCEDURE — 2500000004 HC RX 250 GENERAL PHARMACY W/ HCPCS (ALT 636 FOR OP/ED): Performed by: STUDENT IN AN ORGANIZED HEALTH CARE EDUCATION/TRAINING PROGRAM

## 2024-06-05 PROCEDURE — 84100 ASSAY OF PHOSPHORUS: CPT | Performed by: STUDENT IN AN ORGANIZED HEALTH CARE EDUCATION/TRAINING PROGRAM

## 2024-06-05 PROCEDURE — 85045 AUTOMATED RETICULOCYTE COUNT: CPT

## 2024-06-05 PROCEDURE — 1170000001 HC PRIVATE ONCOLOGY ROOM DAILY

## 2024-06-05 PROCEDURE — 2500000001 HC RX 250 WO HCPCS SELF ADMINISTERED DRUGS (ALT 637 FOR MEDICARE OP): Performed by: STUDENT IN AN ORGANIZED HEALTH CARE EDUCATION/TRAINING PROGRAM

## 2024-06-05 PROCEDURE — 2500000004 HC RX 250 GENERAL PHARMACY W/ HCPCS (ALT 636 FOR OP/ED)

## 2024-06-05 PROCEDURE — 2500000004 HC RX 250 GENERAL PHARMACY W/ HCPCS (ALT 636 FOR OP/ED): Mod: JZ

## 2024-06-05 PROCEDURE — 85025 COMPLETE CBC W/AUTO DIFF WBC: CPT

## 2024-06-05 PROCEDURE — 2500000002 HC RX 250 W HCPCS SELF ADMINISTERED DRUGS (ALT 637 FOR MEDICARE OP, ALT 636 FOR OP/ED): Mod: MUE

## 2024-06-05 PROCEDURE — C9113 INJ PANTOPRAZOLE SODIUM, VIA: HCPCS | Performed by: STUDENT IN AN ORGANIZED HEALTH CARE EDUCATION/TRAINING PROGRAM

## 2024-06-05 PROCEDURE — 82668 ASSAY OF ERYTHROPOIETIN: CPT

## 2024-06-05 PROCEDURE — 86900 BLOOD TYPING SEROLOGIC ABO: CPT

## 2024-06-05 PROCEDURE — 2500000005 HC RX 250 GENERAL PHARMACY W/O HCPCS: Performed by: STUDENT IN AN ORGANIZED HEALTH CARE EDUCATION/TRAINING PROGRAM

## 2024-06-05 RX ORDER — SODIUM CHLORIDE 9 MG/ML
75 INJECTION, SOLUTION INTRAVENOUS CONTINUOUS
Status: ACTIVE | OUTPATIENT
Start: 2024-06-05 | End: 2024-06-06

## 2024-06-05 RX ADMIN — ALBUMIN HUMAN 25 G: 0.25 SOLUTION INTRAVENOUS at 00:09

## 2024-06-05 RX ADMIN — PANTOPRAZOLE SODIUM 40 MG: 40 INJECTION, POWDER, FOR SOLUTION INTRAVENOUS at 21:51

## 2024-06-05 RX ADMIN — DEXTROMETHORPHAN 30 MG: 30 SUSPENSION, EXTENDED RELEASE ORAL at 21:51

## 2024-06-05 RX ADMIN — TAMSULOSIN HYDROCHLORIDE 0.4 MG: 0.4 CAPSULE ORAL at 09:01

## 2024-06-05 RX ADMIN — NYSTATIN 1 APPLICATION: 100000 POWDER TOPICAL at 21:51

## 2024-06-05 RX ADMIN — ROSUVASTATIN CALCIUM 10 MG: 10 TABLET, FILM COATED ORAL at 21:51

## 2024-06-05 RX ADMIN — DEXTROMETHORPHAN 30 MG: 30 SUSPENSION, EXTENDED RELEASE ORAL at 09:11

## 2024-06-05 RX ADMIN — FERROUS SULFATE TAB 325 MG (65 MG ELEMENTAL FE) 1 TABLET: 325 (65 FE) TAB at 09:01

## 2024-06-05 RX ADMIN — BENZONATATE 100 MG: 100 CAPSULE ORAL at 03:09

## 2024-06-05 RX ADMIN — SODIUM CHLORIDE 75 ML/HR: 9 INJECTION, SOLUTION INTRAVENOUS at 22:08

## 2024-06-05 RX ADMIN — ALBUMIN HUMAN 25 G: 0.25 SOLUTION INTRAVENOUS at 06:50

## 2024-06-05 RX ADMIN — ASPIRIN 81 MG: 81 TABLET, COATED ORAL at 21:51

## 2024-06-05 RX ADMIN — Medication 3 L/MIN: at 08:00

## 2024-06-05 RX ADMIN — NYSTATIN 1 APPLICATION: 100000 POWDER TOPICAL at 09:11

## 2024-06-05 RX ADMIN — OXYCODONE HYDROCHLORIDE AND ACETAMINOPHEN 1000 MG: 500 TABLET ORAL at 09:02

## 2024-06-05 RX ADMIN — METOPROLOL SUCCINATE 150 MG: 50 TABLET, EXTENDED RELEASE ORAL at 09:10

## 2024-06-05 RX ADMIN — Medication 5 MG: at 21:55

## 2024-06-05 RX ADMIN — PANTOPRAZOLE SODIUM 40 MG: 40 INJECTION, POWDER, FOR SOLUTION INTRAVENOUS at 09:02

## 2024-06-05 RX ADMIN — Medication 3 L/MIN: at 20:00

## 2024-06-05 ASSESSMENT — COGNITIVE AND FUNCTIONAL STATUS - GENERAL
TURNING FROM BACK TO SIDE WHILE IN FLAT BAD: A LOT
DRESSING REGULAR UPPER BODY CLOTHING: A LOT
TURNING FROM BACK TO SIDE WHILE IN FLAT BAD: A LOT
WALKING IN HOSPITAL ROOM: TOTAL
PERSONAL GROOMING: A LOT
DAILY ACTIVITIY SCORE: 13
DRESSING REGULAR LOWER BODY CLOTHING: A LOT
MOVING FROM LYING ON BACK TO SITTING ON SIDE OF FLAT BED WITH BEDRAILS: A LOT
TOILETING: A LOT
MOVING FROM LYING ON BACK TO SITTING ON SIDE OF FLAT BED WITH BEDRAILS: A LOT
STANDING UP FROM CHAIR USING ARMS: A LOT
MOBILITY SCORE: 10
EATING MEALS: A LITTLE
WALKING IN HOSPITAL ROOM: TOTAL
CLIMB 3 TO 5 STEPS WITH RAILING: TOTAL
MOVING TO AND FROM BED TO CHAIR: A LOT
CLIMB 3 TO 5 STEPS WITH RAILING: TOTAL
HELP NEEDED FOR BATHING: A LOT
MOVING TO AND FROM BED TO CHAIR: A LOT
STANDING UP FROM CHAIR USING ARMS: A LOT
MOBILITY SCORE: 10

## 2024-06-05 ASSESSMENT — PAIN SCALES - GENERAL: PAINLEVEL_OUTOF10: 0 - NO PAIN

## 2024-06-05 ASSESSMENT — PAIN - FUNCTIONAL ASSESSMENT: PAIN_FUNCTIONAL_ASSESSMENT: 0-10

## 2024-06-05 NOTE — PROGRESS NOTES
"Subjective     Overnight events:  NAEO. Patient denies any acute symptoms. No evidence of bleeding per patient or his wife who was present through the night. Encouraged the patient to keep moving even while in the bed to todd further deconditioning.     Objective   Vital signs:  Blood pressure 103/67, pulse 89, temperature 36.9 °C (98.4 °F), temperature source Temporal, resp. rate 18, height 1.727 m (5' 7.99\"), weight 118 kg (260 lb 5.8 oz), SpO2 96%.    I/O last 3 completed shifts:  In: 4355.4 (36.9 mL/kg) [P.O.:420; Blood:3435.4; IV Piggyback:500]  Out: 1025 (8.7 mL/kg) [Urine:1025 (0.2 mL/kg/hr)]  Weight: 118.1 kg     Physical Exam  Constitutional:       General: He is not in acute distress.  Eyes:      Extraocular Movements: Extraocular movements intact.   Pulmonary:      Effort: Pulmonary effort is normal.      Breath sounds: No wheezing, rhonchi or rales.      Comments: Decreased breath sounds on left  Abdominal:      General: Bowel sounds are normal. There is distension.      Palpations: Abdomen is soft.      Tenderness: There is no abdominal tenderness.   Musculoskeletal:      Comments: Mild Bilat LE edema 1+   Neurological:      General: No focal deficit present.      Mental Status: He is oriented to person, place, and time.         Relevant Results        Labs:  Last CBC:  Lab Results   Component Value Date    WBC 3.5 (L) 06/04/2024    HGB 7.0 (L) 06/04/2024    HCT 21.9 (L) 06/04/2024    MCV 89 06/04/2024    PLT 94 (L) 06/04/2024       Last RFP:  Lab Results   Component Value Date    GLUCOSE 96 06/04/2024    CALCIUM 8.0 (L) 06/04/2024     06/04/2024    K 4.3 06/04/2024    CO2 26 06/04/2024     06/04/2024    BUN 42 (H) 06/04/2024    CREATININE 3.35 (H) 06/04/2024       Last LFTs:  Lab Results   Component Value Date    ALT 8 (L) 06/04/2024    AST 24 06/04/2024    ALKPHOS 58 06/04/2024    BILITOT 0.5 06/04/2024       Last coags:  Lab Results   Component Value Date    INR 1.1 05/26/2024    APTT 28 " 05/30/2024       Micro/culture data:  No results found for the last 90 days.      Imaging:  ECG 12 Lead  Atrial flutter with variable AV block with premature ventricular or aberrantly conducted complexes  Low voltage QRS  Abnormal ECG  When compared with ECG of 28-MAY-2024 01:40,  Atrial flutter has replaced Atrial fibrillation  Nonspecific T wave abnormality, improved in Inferior leads  Confirmed by Jovanny Loera ([id number]) on 5/31/2024 1:51:33 PM  Flexible Sigmoidoscopy  Table formatting from the original result was not included.  Impression  Dark red blood visualized in the rectum. Blood was cleared without any   active bleeding appreciated nor underlying mucosal changes.   Few small and large, scattered diverticula with no inflammation in the   sigmoid colon; no bleeding was identified. Diverticula were irrigated   without any bleeding or stigmata of recent bleed appreciated.  All observed locations appeared normal, including the sigmoid colon,   rectosigmoid and rectum. No mucosal abnormalities. Normal on retroflexion.  Green bilious stool proximal to the rectum, most notably in the proximal   descending colon and transverse colon, without any blood, blood clots or   hematin.    Findings  Dark red blood visualized in the rectum. Blood was cleared without any   active bleeding appreciated nor underlying mucosal changes.   Few small and large, scattered diverticula with no inflammation in the   sigmoid colon; no bleeding was identified. Diverticula were irrigated   without any bleeding or stigmata of recent bleed appreciated.  All observed locations appeared normal, including the sigmoid colon,   rectosigmoid and rectum. No mucosal abnormalities. Normal on retroflexion.  Green bilious stool proximal to the rectum, most notably in the proximal   descending colon and transverse colon, without any blood, blood clots or   hematin.    Recommendation   Follow up with primary gastroenterologist    Follow up with inpatient  GI consult service, Dr. Valenzuela and Dr. Archibald  Continue on IV PPI BID        Indication  Iron deficiency anemia due to chronic blood loss    Staff  Staff Role   Kaylen Valenzuela MD Proceduralist   Ale Archibald MD    Medications  micafungin (Mycamine) 100 mg in dextrose 5% 100 mL IV 95.24 mg*    *From user-documented volume   fentaNYL PF (Sublimaze) injection  - Omnicell Override Pull Cannot be   calculated*    *Administration dose not documented   midazolam (Versed) injection  - Omnicell Override Pull Cannot be   calculated*    *Administration dose not documented   (Totals for administrations occurring from 1228 to 1404 on 05/24/24)     Preprocedure  A history and physical has been performed, and patient medication   allergies have been reviewed. The patient's tolerance of previous   anesthesia has been reviewed. The risks and benefits of the procedure and   the sedation options and risks were discussed with the patient. All   questions were answered and informed consent obtained.    Details of the Procedure  The patient underwent no sedation. The patient's blood pressure, ECG,   heart rate, level of consciousness, oxygen and respirations were monitored   throughout the procedure. A digital rectal exam was performed. A perianal   exam was performed. The scope was introduced through the anus and advanced   to the transverse colon. Retroflexion was performed in the rectum. The   quality of bowel preparation was evaluated using the [place] Bowel   Preparation Scale with scores of: left colon = 3. Bowel prep was not   adequate. The patient experienced no blood loss. The procedure was not   difficult. The patient tolerated the procedure well. There were no   apparent adverse events.     Events  Procedure Events   Event Event Time   ENDO SCOPE IN TIME 5/24/2024  1:08 PM   ENDO SCOPE OUT TIME 5/24/2024  1:26 PM     Specimens  No specimens collected    Procedure Location  [facility]  [facility]  [address]  [facility] [address]  [phone]    Referring Provider  Grayson Nichols, APRN-CNP  [address] [address]    Procedure Provider  Ale Archibald MD  Lower extremity venous duplex left  Narrative: Interpreted By:  Nathan Argueta  and Chaparrita Randolph   STUDY:  [facility] US LOWER EXTREMITY VENOUS DUPLEX LEFT;  5/30/2024 3:58 pm      INDICATION:  Signs/Symptoms:LLE swelling, eval for DVT.      COMPARISON:  None.      ACCESSION NUMBER(S):  [id number]      ORDERING CLINICIAN:  ALEXANDRA AVILA      TECHNIQUE:  Vascular ultrasound of the left lower extremity was performed.  Real-time compression views as well as Gray scale, color Doppler and  spectral Doppler waveform analysis was performed.      FINDINGS:  Evaluation of the visualized portions of the left common femoral  vein, proximal, mid, and distal femoral vein, and popliteal vein were  performed.  Evaluation of the visualized portions of the  posterior  tibial and peroneal veins were also performed. Evaluation of the calf  veins limited due to edema.      Echogenic intraluminal material in the proximal greater saphenous  vein with partial compressibility and color Doppler signal The  evaluated veins demonstrate normal compressibility. There is intact  venous flow demonstrating normal respiratory variability and normal  augmentation of flow with calf compression. Therefore, there is no  ultrasonographic evidence for deep vein thrombosis within the  evaluated veins.      Impression: 1.  No sonographic evidence for deep vein thrombosis within the  evaluated veins of the left lower extremity.  2. Nonocclusive thrombus of the proximal greater saphenous vein, a  superficial vein. Recommend correlation with concern for superficial  thrombophlebitis.      I personally reviewed the images/study and I agree with the findings  as stated by Agustín Cheatham MD. This study was interpreted at  [facility], [place], [place].      MACRO:  None      Signed by: Nathan Argueta 5/31/2024 5:32 AM  Dictation workstation:   [id number]         Assessment/Plan   Principal Problem:    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Multi)  Active Problems:    Anemia    Vinicius Arriola is a 88 y.o. male with PMHx of PAD, afib/flutter, hx DVT on warfarin, HTN, CAD s/p stent, mitral regurg, HLD, polycythemia vera, managed by Dr. 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 Presented to [facility] 5/14 with abdominal pain, fever, diarrhea, urinary retention c/f sepsis presumed 2/2 PNA; CT CAP showed known LLL collapse 2/2 mesothelioma, new post L chest wall invasion, and c/f L pulmonary trunk tumor thrombus.  5/23 developed shock and acute hypoxic resp failure requiring MICU transfer for pressors and NIV (airvo), thought 2/2 GI bleeding with melena and coffee ground drainage from NGT. Quickly weaned off pressors, s/p flex sig with blood in rectum but no active bleed. Now transferred back to floor for further mgmt. Since transfer to floor, his course has been complicated by asymptomatic episodes of afib w/ rvr and flutter likely 2/2 to overall deconditioning, anemia, tenuous volume status, and resolving infection ultimately abated with oral Metoprolol. Continues to have down trending anemia, GI consulted without plan for scope.     Updates:   -Hgb with poor increment follow unit of blood yesterday (6.5>7.0); Will reengage GI vs Benign heme given tenuous hgb still concerning.  - Day two of Albumin challenge with worsening CrCl, will consult nephrology today.    #History Afib, HLD, CAD, PAD  #History DVT/PE on Warfarin  :: 5/21 TTE showing:  Left ventricular systolic function is hyperdynamic with a 70-75% estimated ejection fraction, Mildly elevated RVSP, Moderately  "dilated aortic root   Continues to have episodes of Afib w/ RVR occasional Aflut likely multifactorial  - Metop tartrate at 50mg TID > Metop Succinate 150 mg qd  - Holding warfarin, aspirin  - Continue home statin  - D/c Heparin ggt    #Anemia  :: Likely 2/2 GIB noted this admission  :: Melena and coffee grounds via NGT noted 5/22  :: Hgb initially 10, decreased to 6.9 on 5/23 (1u pRBC) > slow decrease again to 6.8 on 6/2 (1u pRBC)  :: Fe/TIBC (64/175 & 37%), B12 (1287), B9 (11), Hapto (pending)  - Continue to trend Hgb, platelets goal > 20. Platelet goal liberalized as no active bleeding, if hgb decreasing or bleeding clinically then transfuse to goal 50  - Anemia workup revels Mixed picture KAY and anemia of chronic disease  - Will restart home Iron supplementation ( mg daily)  - GI consulted, no plan for acute intervention.     #Worsening mesothelioma  #Tumor Thrombus  #Mesothelioma with Loculated Pleural Effusions   #Left Hilar Mass, Left Pulmonary Trunk Mass  #History JOVANNI, Pulmonary Embolism  :: primary oncologist Dr. Galvan  :: s/p L VATS w/ decort 5/2022, XRT 9-10/22  :: s/p \"half-dose\" pemfexy on 5/14  :: CT chest 5/18 with significant collapse of left lung, loculated pleural effusions consistent with patient's known mesothelioma.  Also shows ill-defined patchy infiltrate in right lower lobe.  Shows left hilar mass with extension into left pulmonary trunk likely tumor thrombus   - Holding warfarin, continue to monitor   - Dr. Henderson recommends transitioning to Eliquis on discharge, deferring anticoagulation iso thrombocytopenia. Plt stable above 50, resume AC (Heparin ggt; low intensity); plan to transition to Eliquis 2.5mg BID (if tolerating heparin...)  - Continuing protocol w/ 1 mg folic acid daily  - Hospice Consult for informational meeting 5/31. Not currently interested in pursuing hospice.    #Tachypnea   #Elevated A-a gradient   #AHRF (Improved)  :: multifactorial, like d/t shunt secondary to " malignancy vs deconditioning given prolonged admission vs intermittent Afib vs v/q mismatch   :: RR 23-44   :: 5/22 ABG: pH 7.53, pCO2 31, pO2 109, Lactate 2.2, FiO2 40.  - On 4L nasal cannula previously requiring intermittent AIRVO to assist with work of breathing.   - gradually wean supplemental O2 for SpO2 goal >90%   - continue abx regimen  - CTM     #Coffee grounds via NGT (placed 5/21), improved   #Melena likely d/t GI bleed  :: KUB 5/21, showing moderate distention of stomach with nonspecific predominantly fluid-filled bowel pattern.  :: 5/20-5/21 patient emesis that is watery, dark brown, malodorous   :: did initially present w/ diarrhea could have been chemo induced vs stool ball  :: INR 4.9>6.3>2.4 >1.2  :: s/p Vitamin K 10 mg  :: KUB w/ dilated bowel loops   - Soft diet, encouraged patient to go slow  - holding ferrous sulfate    - pantoprazole 40 bid   - GI consulted, appreciate recs  -s/p flex sig with no evidence of active bleed              -Will hold off EGD since coffee ground emesis resolved     #Illeus (improved/resolved)  :: KUB 5/26 kub shows distended bowel loops likely ileus, will monitor for improvement with PO diet.   - NG tube clamped; If becoming nauseous or developing vomiting, can unclamp NGT and place to LIWS.  - Tolerating PO and having BM. Will CTM     #Pneumonia, Right lower lobe ill-defined patchy infiltrate (resolved)  :: Resp Cult salivary contamination  :: MRSA nares: negative  :: Strep/Legionella antigens: negative  :: CXR 5/21 showing persistent patchy right infrahilar airspace opacities; improved on serial CXRs  :: 5/22 CT CAP w/o contrast: Interval improvement of the right lower lobe patchy infiltrate  :: s/p Azithro (5/16-5/18)  :: s/p vanc (5/16-5/18) (5/20-5/22) and zosyn (5/16-5/18) (5/20-22)  -eropenem (5/19-5/20) (5/22-25) and micafungin (5/22-25)  -Abx stopped 5/25 given prolonged course with improvement/decline independent of abx, do not suspect active infection  currently.       #Fever  #Sepsis most likely d/t pneumonia  :: at OSH met SIRS criteria (febrile, tachypnea)  :: UA 5/18 with small LE, 3+ bacteria, urine cx 5/21 negative  :: Resp Cult salivary contamination  :: MRSA nares: negative  :: Strep/Legionella antigens: negative  :: c diff and enteric stool panel: negative  :: BC 5/16 NGTD, repeat BC 5/20 NGTD  :: CRP 30.46>42.36  :: CXR 5/21 showing persistent patchy right infrahilar airspace opacities    :: s/p Azithro (5/16-5/18)  :: asymmetric warmth, erythema, edema noted in left lower extremity   :: s/p vanc (5/16-5/18) (5/20-5/25) and zosyn (5/16-5/18) (5/20-22)  :: 5/22 CT CAP w/o contrast: Interval improvement of the right lower lobe patchy infiltrate, Interval flattening of the inferior vena cava which may be seen with hypovolemia, Improved proximal sigmoid diverticulitis.  - repeat blood cultures drawn 5/22, NGTD   - meropenem (5/19-5/20) (5/22-5/25) and micafungin (5/22-5/25); restarted Vanc/Sofy briefly overnight 5/27.     #Lower leg skin changes likely d/t cellulitis   :: previously improved margins of left lower extremity skin changes  - Margins worsened on morning exam 5/30, tender to palpation, slightly warmer than adjacent leg.   - CTM  - Keflex 500 q8 5 day course for cellulitis (5/30-6/3)     #VIK on CKD, likely prerenal d/t fluid status   # CKD (baseline Cr 1.3-1.4)  # urine retention s/p Boyd Catheter Placement by Urology in OSH  Estimated Creatinine Clearance: 18.7 mL/min (A) (by C-G formula based on SCr of 3.54 mg/dL (H)).  :: US renal 5/16: Nonobstructing 1 cm left renal calculus, no hydronephrosis  :: Cr 1.64>2.26>3.00 (Baseline Cr 1.3-1.4)  :: Urine electrolytes pre-renal, Net negative, hypernatremia with prolonged NPO all suggest pre-renal hypovolemic VIK. Will encourage PO intake  - will eventually need boyd removal and trial  - strict in/out  - tamsulosin 0.4 mg daily  -minimize nephrotoxic medications as able  - boyd removed 5/23/24  (5/16-5/23)  - Albumin 25 QID (2days) with worsening CrCl, will consult Nephrology.    #Pancytopenia, most likely medication induced from pemfexy, resolved  #Thrombocytopenia  :: c/f TTP  ::   :: s/p pemfexy 5/14  :: d dimer 1521, fibrinogen >1000- no c/f dic  :: no schistocytes on smear,   :: filgrastim 480 mcg (5/21-5/22)  :: 5/23 ANC 4130; haptoglobin 400   - CTM. Goal >10  - s/p several units of plts     #Elevated INR  :: INR 4.9>6.3>2.4 >1.2  :: s/p Vitamin K 10 mg  -CTM    #Hospital-acquired Delirium  :: waxing and waning altered mental status  -Delirium precautions  -hold home trazodone for insomnia      Antibiotics:: none  Dispo: SNF.      F: Replete PRN  E: Replete PRN  N: Regular Diet  DVT Ppx: None iso of low Plt  GI Ppx: Pantoprazole   Access/Lines: PIV x3  Forrest External (5/23- )  Abx: None  O2: 2L NC    Pain regimen: Tylenol   GI Laxative: None     Code status: DNR and No Intubation  Emergency contact:Kirsten Arriola (Spouse) [phone]        Ramesh Conlye MD  PGY-1 Internal Medicine

## 2024-06-05 NOTE — CONSULTS
"Vinicius Arriola   88 y.o.      Vitals:    06/04/24 0637   Weight: 118 kg (260 lb 5.8 oz)      MRN/Room: [id number]    HPI  Vinicius Arriola is a 88 y.o. 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 Nephrology consulted due to VIK.     Mr Arriola has had an extended hospital course. He initially presented back on 05/14 due to abdominal pain, fever and diarrhea, had to be admitted to the MICU on account of pneumonia, AHRF, septic shock and GI bleeding. He has since been transferred to the regular nursing floor.     Mr Arriola has an VIK for which Nephrology has been consulted.      BP 93/56 (BP Location: Left arm, Patient Position: Lying)   Pulse 69   Temp 36.7 °C (98.1 °F) (Temporal)   Resp 18   Ht 1.727 m (5' 7.99\")   Wt 118 kg (260 lb 5.8 oz)   SpO2 97%   BMI 39.60 kg/m²      Past Medical History:   Diagnosis Date    Acute embolism and thrombosis of unspecified deep veins of unspecified lower extremity (Multi)     Acute embolism and thrombosis of unspecified deep veins of unspecified lower extremity    Dental disease     Upper and lower dentures    Encounter for preprocedural cardiovascular examination 11/02/2021    Pre-operative cardiovascular examination    Obesity, unspecified 07/15/2022    Class 2 obesity with body mass index (BMI) of 38.0 to 38.9 in adult    Personal history of diseases of the blood and blood-forming organs and certain disorders involving the immune mechanism     History of polycythemia    Personal history of other diseases of male genital organs     History of benign prostatic hyperplasia    Personal history of other diseases of the circulatory system     History of hypertension    Personal history of other diseases of the circulatory system     History of cardiac arrhythmia    Personal history of other diseases of the circulatory system 10/21/2021    History " of abnormal electrocardiography    Personal history of other diseases of the circulatory system     History of essential hypertension    Personal history of other diseases of the nervous system and sense organs     History of sleep apnea    Personal history of other malignant neoplasm of skin     History of malignant neoplasm of skin    Personal history of other specified conditions     History of balance disorder    Personal history of other venous thrombosis and embolism     History of deep venous thrombosis      Past Surgical History:   Procedure Laterality Date    OTHER SURGICAL HISTORY  08/20/2019    Hernia repair    OTHER SURGICAL HISTORY  08/20/2019    Tonsillectomy with adenoidectomy    OTHER SURGICAL HISTORY  08/20/2019    Cataract surgery    OTHER SURGICAL HISTORY  06/10/2022    Pulmonary decortication    OTHER SURGICAL HISTORY  11/02/2021    Knee replacement    OTHER SURGICAL HISTORY  01/02/2020    Cardioversion    OTHER SURGICAL HISTORY  01/02/2020    Finger surgical procedure    OTHER SURGICAL HISTORY  05/13/2022    Cardiac catheterization with stent placement    OTHER SURGICAL HISTORY  05/26/2022    Pulmonary decortication    OTHER SURGICAL HISTORY  10/21/2021    Back surgery    OTHER SURGICAL HISTORY  11/02/2021    Colonoscopy    OTHER SURGICAL HISTORY  11/02/2021    Inguinal hernia repair    OTHER SURGICAL HISTORY  11/02/2021    Trigger finger repair    OTHER SURGICAL HISTORY  11/02/2021    Umbilical hernia repair    OTHER SURGICAL HISTORY  11/04/2021    Carpal tunnel surgery      Family History   Problem Relation Name Age of Onset    Accidental death Mother      Coronary artery disease Mother      Other (Cardiac disorder) Father      Dementia Father      Cancer Father      Colon cancer Daughter       Social History     Socioeconomic History    Marital status:      Spouse name: Not on file    Number of children: Not on file    Years of education: Not on file    Highest education level: Not on  file   Occupational History    Not on file   Tobacco Use    Smoking status: Former     Current packs/day: 0.00     Average packs/day: 1 pack/day for 10.0 years (10.0 ttl pk-yrs)     Types: Cigarettes     Start date:      Quit date:      Years since quittin.4    Smokeless tobacco: Never   Vaping Use    Vaping status: Never Used   Substance and Sexual Activity    Alcohol use: Yes     Alcohol/week: 4.0 standard drinks of alcohol     Types: 4 Cans of beer per week     Comment: 4 beers weekly    Drug use: Never    Sexual activity: Defer   Other Topics Concern    Not on file   Social History Narrative    Not on file     Social Determinants of Health     Financial Resource Strain: Low Risk  (2024)    Overall Financial Resource Strain (CARDIA)     Difficulty of Paying Living Expenses: Not very hard   Food Insecurity: No Food Insecurity (2024)    Hunger Vital Sign     Worried About Running Out of Food in the Last Year: Never true     Ran Out of Food in the Last Year: Never true   Transportation Needs: No Transportation Needs (2024)    PRAPARE - Transportation     Lack of Transportation (Medical): No     Lack of Transportation (Non-Medical): No   Physical Activity: Inactive (2024)    Exercise Vital Sign     Days of Exercise per Week: 0 days     Minutes of Exercise per Session: 0 min   Stress: No Stress Concern Present (2023)    [employer] - Occupational Stress Questionnaire     Feeling of Stress : Not at all   Social Connections: Feeling Socially Integrated (2024)    OASIS : Social Isolation     Frequency of experiencing loneliness or isolation: Never   Intimate Partner Violence: Not At Risk (2023)    Humiliation, Afraid, Rape, and Kick questionnaire     Fear of Current or Ex-Partner: No     Emotionally Abused: No     Physically Abused: No     Sexually Abused: No   Housing Stability: Low Risk  (2024)    Housing Stability Vital Sign      Unable to Pay for Housing in the Last Year: No     Number of Places Lived in the Last Year: 1     Unstable Housing in the Last Year: No       Allergies   Allergen Reactions    Benadryl Allergy Decongestant Anxiety and Insomnia    Diphenhydramine Anxiety     anxious    Diphenhydramine Hcl Anxiety and Insomnia        Prior to Admission Medications   Prescriptions Last Dose Informant Patient Reported? Taking?   ZINC ORAL   Yes No   Sig: Take 1 tablet by mouth once daily.   ascorbic acid (Vitamin C) 1,000 mg tablet   Yes No   Sig: Take 1 tablet (1,000 mg) by mouth once daily.   aspirin 81 mg EC tablet   Yes No   Sig: Take 1 tablet (81 mg) by mouth once daily at bedtime.   carboxymethylcellulose (Refresh Celluvisc) 1 % ophthalmic solution dropperette   Yes No   Sig: Administer 1 drop into affected eye(s) once daily in the morning.   ferrous sulfate 325 (65 Fe) MG EC tablet   Yes No   Sig: Take 65 mg by mouth 2 times a day with meals. Do not crush, chew, or split.   ferrous sulfate, 325 mg ferrous sulfate, tablet   No No   Sig: TAKE 1 TABLET BY MOUTH TWICE A DAY WITH MEALS   furosemide (Lasix) 20 mg tablet   Yes No   Sig: Take 1 tablet (20 mg) by mouth once daily.   melatonin 5 mg tablet   Yes No   Sig: Take 1 tablet (5 mg) by mouth as needed at bedtime.   metoprolol tartrate (Lopressor) 25 mg tablet   Yes No   Sig: Take 1 tablet (25 mg) by mouth 2 times a day.   nitroglycerin (Nitrostat) 0.4 mg SL tablet   Yes No   Sig: Place under the tongue every 5 minutes if needed for chest pain (up to 3 doses).   omega-3 fatty acids-fish oil 360-1,200 mg capsule   Yes No   Sig: Take 1 capsule (1,200 mg) by mouth twice a day.   potassium chloride CR (Klor-Con) 10 mEq ER tablet   No No   Sig: Take 1 tablet (10 mEq) by mouth once daily.   promethazine-DM (Phenergan-DM) 6.25-15 mg/5 mL syrup   Yes No   Sig: Take 1.25 mL by mouth 4 times a day as needed for cough.   rosuvastatin (Crestor) 10 mg tablet   Yes No   Sig: Take 1 tablet (10  mg) by mouth once daily.   traZODone (Desyrel) 50 mg tablet  Self Yes No   Sig: Take 2 tablets (100 mg) by mouth once daily at bedtime.   warfarin (Coumadin) 1 mg tablet   No No   Sig: Take 1-2 tablets daily or as directed per [facility]   warfarin (Coumadin) 2 mg tablet   No No   Sig: TAKE 1 TABLET DAILY OR AS DIRECTED PER [facility]   warfarin (Coumadin) 4 mg tablet   No No   Sig: TAKE AS DIRECTED Per [facility] Protime Department      Facility-Administered Medications: None        Meds:   ascorbic acid, 1,000 mg, Daily  aspirin, 81 mg, Nightly  dextromethorphan, 30 mg, q12h ADELSO  ergocalciferol, 1,250 mcg, Every Sunday  ferrous sulfate (325 mg ferrous sulfate), 65 mg of iron, Daily with breakfast  metoprolol succinate XL, 150 mg, Daily  nystatin, 1 Application, BID  oxygen, , Continuous - Inhalation  pantoprazole, 40 mg, BID  perflutren protein A microsphere, 0.5 mL, Once in imaging  rosuvastatin, 10 mg, Nightly  sulfur hexafluoride microsphr, 2 mL, Once in imaging  tamsulosin, 0.4 mg, Daily         acetaminophen, 650 mg, q4h PRN   Or  acetaminophen, 650 mg, q4h PRN  albuterol, 2.5 mg, q6h PRN  benzonatate, 100 mg, TID PRN  carboxymethylcellulose, 1 drop, PRN  diclofenac sodium, 4 g, 4x daily PRN  melatonin, 5 mg, Nightly PRN  nitroglycerin, 0.4 mg, q5 min PRN  ondansetron, 4 mg, q8h PRN   Or  ondansetron, 4 mg, q8h PRN  oxygen, , Continuous PRN - O2/gases        Vitals:    06/05/24 1708   BP: 93/56   Pulse: 69   Resp: 18   Temp: 36.7 °C (98.1 °F)   SpO2: 97%        06/03 1900 - 06/05 0659  In: 4355.4 [P.O.:420]  Out: 1025 [Urine:1025]     General appearance: Drowsy   Eyes: non-icteric  Skin: no apparent rash  Heart: S1 S2 regular  Lungs: CTA bilat.  No wheezing/crackles  Abdomen: soft, nt/nd  Extremities: No edema bilat  : No Forrest  Neuro: No FND   ACCESS: No HD access    Blood Labs:  Results for orders placed or performed during the hospital encounter of 05/19/24 (from the past 24 hour(s))    Magnesium   Result Value Ref Range    Magnesium 1.93 1.60 - 2.40 mg/dL   Phosphorus   Result Value Ref Range    Phosphorus 4.5 2.5 - 4.9 mg/dL   CBC and Auto Differential   Result Value Ref Range    WBC 3.6 (L) 4.4 - 11.3 x10*3/uL    nRBC 0.0 0.0 - 0.0 /100 WBCs    RBC 2.49 (L) 4.50 - 5.90 x10*6/uL    Hemoglobin 7.0 (L) 13.5 - 17.5 g/dL    Hematocrit 21.4 (L) 41.0 - 52.0 %    MCV 86 80 - 100 fL    MCH 28.1 26.0 - 34.0 pg    MCHC 32.7 32.0 - 36.0 g/dL    RDW 16.6 (H) 11.5 - 14.5 %    Platelets 90 (L) 150 - 450 x10*3/uL    Neutrophils % 77.8 40.0 - 80.0 %    Immature Granulocytes %, Automated 2.2 (H) 0.0 - 0.9 %    Lymphocytes % 7.2 13.0 - 44.0 %    Monocytes % 5.3 2.0 - 10.0 %    Eosinophils % 7.5 0.0 - 6.0 %    Basophils % 0.0 0.0 - 2.0 %    Neutrophils Absolute 2.79 1.60 - 5.50 x10*3/uL    Immature Granulocytes Absolute, Automated 0.08 0.00 - 0.50 x10*3/uL    Lymphocytes Absolute 0.26 (L) 0.80 - 3.00 x10*3/uL    Monocytes Absolute 0.19 0.05 - 0.80 x10*3/uL    Eosinophils Absolute 0.27 0.00 - 0.40 x10*3/uL    Basophils Absolute 0.00 0.00 - 0.10 x10*3/uL   Comprehensive metabolic panel   Result Value Ref Range    Glucose 122 (H) 74 - 99 mg/dL    Sodium 138 136 - 145 mmol/L    Potassium 4.3 3.5 - 5.3 mmol/L    Chloride 104 98 - 107 mmol/L    Bicarbonate 23 21 - 32 mmol/L    Anion Gap 15 10 - 20 mmol/L    Urea Nitrogen 46 (H) 6 - 23 mg/dL    Creatinine 3.54 (H) 0.50 - 1.30 mg/dL    eGFR 16 (L) >60 mL/min/1.73m*2    Calcium 8.4 (L) 8.6 - 10.6 mg/dL    Albumin 3.6 3.4 - 5.0 g/dL    Alkaline Phosphatase 53 33 - 136 U/L    Total Protein 5.7 (L) 6.4 - 8.2 g/dL    AST 24 9 - 39 U/L    Bilirubin, Total 0.5 0.0 - 1.2 mg/dL    ALT 10 10 - 52 U/L   Type and screen   Result Value Ref Range    ABO TYPE A     Rh TYPE POS     ANTIBODY SCREEN NEG    Reticulocytes   Result Value Ref Range    Retic % 0.3 (L) 0.5 - 2.0 %    Retic Absolute 0.008 (L) 0.017 - 0.110 x10*6/uL    Reticulocyte Hemoglobin 35 28 - 38 pg    Immature Retic  fraction 6.7 <=16.0 %        ASSESSMENT:  Vinicius Arriola is a 88 y.o. 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 Nephrology consulted due to VIK.     #Acute kidney injury, non-oliguric   -Baseline Cr: 1.2-1.3  -Cr trend: Trended up to peak of 3.6 on 05/25, has since remained relatively stable. Most recently 3.5 today.   -Urine output: Non-oliguric    -Investigations:   UA (05/30): RBC 1-2                      WBC 1-5                       Trace protein     -Etiology: VIK likely hemodynamically mediated ATN in setting of shock, hypotension and possible contribution from nephrotoxicity of Pemetrexed.     -Patient does appear volume down on exam this afternoon, with dry mucosa.     RECOMMENDATIONS:  -Repeat UA, urine electrolyte panel  -Renal ultrasound  -NS @ 75cc/h overnight, will reassess volume status tomorrow  -Strict I and O charting  -Supportive management of VIK for now  -Dose medications for renal function  -Avoid contrast  -No indications for renal replacement therapy at present.     KAMALA Valerio MD  Nephrology Fellow   Daytime / Weekend Renal Pager [phone]  After 7 pm Emergencies [phone] Pager [phone]

## 2024-06-05 NOTE — CARE PLAN
Problem: Fall/Injury  Goal: Pace activities to prevent fatigue by end of the shift  6/5/2024 0226 by Christiana Blake RN  Outcome: Progressing  6/5/2024 0225 by Christiana Blake RN  Outcome: Progressing     Problem: Skin  Goal: Prevent/manage excess moisture  Recent Flowsheet Documentation  Taken 6/5/2024 0226 by Christiana Blake RN  Prevent/manage excess moisture:   Cleanse incontinence/protect with barrier cream   Moisturize dry skin   Monitor for/manage infection if present  Goal: Promote/optimize nutrition  6/5/2024 0226 by Christiana Blake RN  Outcome: Progressing  6/5/2024 0225 by Christiana Blake RN  Outcome: Progressing  Goal: Decreased wound size/increased tissue granulation at next dressing change  6/5/2024 0226 by Christiana Blake RN  Outcome: Progressing  6/5/2024 0225 by Christiana Blake RN  Outcome: Progressing  Goal: Participates in plan/prevention/treatment measures  6/5/2024 0226 by Christiana Blake RN  Outcome: Progressing  6/5/2024 0225 by Christiana Blake RN  Outcome: Progressing  Goal: Prevent/manage excess moisture  6/5/2024 0226 by Christiana Blake RN  Outcome: Progressing  Flowsheets (Taken 6/5/2024 0226)  Prevent/manage excess moisture:   Cleanse incontinence/protect with barrier cream   Moisturize dry skin   Monitor for/manage infection if present  6/5/2024 0225 by Christiana Blake RN  Outcome: Progressing  Goal: Prevent/minimize sheer/friction injuries  6/5/2024 0226 by Christiana Blake RN  Outcome: Progressing  Flowsheets (Taken 6/4/2024 0459)  Prevent/minimize sheer/friction injuries: Use pull sheet  6/5/2024 0225 by Christiana Blake RN  Outcome: Progressing  Goal: Promote skin healing  6/5/2024 0226 by Christiana Blake RN  Outcome: Progressing  Flowsheets (Taken 6/4/2024 0459)  Promote skin healing:   Assess skin/pad under line(s)/device(s)   Turn/reposition every 2 hours/use positioning/transfer devices  6/5/2024 0225 by Christiana Blake RN  Outcome: Progressing

## 2024-06-06 ENCOUNTER — APPOINTMENT (OUTPATIENT)
Dept: CARDIOLOGY | Facility: HOSPITAL | Age: 89
End: 2024-06-06
Payer: MEDICARE

## 2024-06-06 ENCOUNTER — APPOINTMENT (OUTPATIENT)
Dept: RADIOLOGY | Facility: HOSPITAL | Age: 89
DRG: 871 | End: 2024-06-06
Payer: MEDICARE

## 2024-06-06 ENCOUNTER — APPOINTMENT (OUTPATIENT)
Dept: CARDIOLOGY | Facility: CLINIC | Age: 89
End: 2024-06-06
Payer: MEDICARE

## 2024-06-06 LAB
ALBUMIN SERPL BCP-MCNC: 3.1 G/DL (ref 3.4–5)
ALP SERPL-CCNC: 56 U/L (ref 33–136)
ALT SERPL W P-5'-P-CCNC: 11 U/L (ref 10–52)
ANION GAP SERPL CALC-SCNC: 15 MMOL/L (ref 10–20)
APPEARANCE UR: ABNORMAL
AST SERPL W P-5'-P-CCNC: 26 U/L (ref 9–39)
BASOPHILS # BLD AUTO: 0.01 X10*3/UL (ref 0–0.1)
BASOPHILS NFR BLD AUTO: 0.3 %
BILIRUB SERPL-MCNC: 0.5 MG/DL (ref 0–1.2)
BILIRUB UR STRIP.AUTO-MCNC: NEGATIVE MG/DL
BUN SERPL-MCNC: 49 MG/DL (ref 6–23)
CALCIUM SERPL-MCNC: 8.4 MG/DL (ref 8.6–10.6)
CHLORIDE SERPL-SCNC: 105 MMOL/L (ref 98–107)
CHLORIDE UR-SCNC: 16 MMOL/L
CHLORIDE/CREATININE (MMOL/G) IN URINE: 15 MMOL/G CREAT (ref 23–275)
CO2 SERPL-SCNC: 23 MMOL/L (ref 21–32)
COLOR UR: YELLOW
CREAT SERPL-MCNC: 3.69 MG/DL (ref 0.5–1.3)
CREAT UR-MCNC: 106.5 MG/DL (ref 20–370)
EGFRCR SERPLBLD CKD-EPI 2021: 15 ML/MIN/1.73M*2
EOSINOPHIL # BLD AUTO: 0.37 X10*3/UL (ref 0–0.4)
EOSINOPHIL NFR BLD AUTO: 10 %
ERYTHROCYTE [DISTWIDTH] IN BLOOD BY AUTOMATED COUNT: 17 % (ref 11.5–14.5)
GLUCOSE SERPL-MCNC: 93 MG/DL (ref 74–99)
GLUCOSE UR STRIP.AUTO-MCNC: NORMAL MG/DL
HCT VFR BLD AUTO: 24 % (ref 41–52)
HGB BLD-MCNC: 7.4 G/DL (ref 13.5–17.5)
IMM GRANULOCYTES # BLD AUTO: 0.1 X10*3/UL (ref 0–0.5)
IMM GRANULOCYTES NFR BLD AUTO: 2.7 % (ref 0–0.9)
KETONES UR STRIP.AUTO-MCNC: NEGATIVE MG/DL
LEUKOCYTE ESTERASE UR QL STRIP.AUTO: NEGATIVE
LYMPHOCYTES # BLD AUTO: 0.24 X10*3/UL (ref 0.8–3)
LYMPHOCYTES NFR BLD AUTO: 6.5 %
MAGNESIUM SERPL-MCNC: 1.93 MG/DL (ref 1.6–2.4)
MCH RBC QN AUTO: 28.8 PG (ref 26–34)
MCHC RBC AUTO-ENTMCNC: 30.8 G/DL (ref 32–36)
MCV RBC AUTO: 93 FL (ref 80–100)
MONOCYTES # BLD AUTO: 0.2 X10*3/UL (ref 0.05–0.8)
MONOCYTES NFR BLD AUTO: 5.4 %
NEUTROPHILS # BLD AUTO: 2.79 X10*3/UL (ref 1.6–5.5)
NEUTROPHILS NFR BLD AUTO: 75.1 %
NITRITE UR QL STRIP.AUTO: NEGATIVE
NRBC BLD-RTO: 0 /100 WBCS (ref 0–0)
PH UR STRIP.AUTO: 5.5 [PH]
PHOSPHATE SERPL-MCNC: 4.7 MG/DL (ref 2.5–4.9)
PLATELET # BLD AUTO: 97 X10*3/UL (ref 150–450)
POTASSIUM SERPL-SCNC: 4.3 MMOL/L (ref 3.5–5.3)
POTASSIUM UR-SCNC: 38 MMOL/L
POTASSIUM/CREAT UR-RTO: 36 MMOL/G CREAT
PROT SERPL-MCNC: 5.6 G/DL (ref 6.4–8.2)
PROT UR STRIP.AUTO-MCNC: ABNORMAL MG/DL
RBC # BLD AUTO: 2.57 X10*6/UL (ref 4.5–5.9)
RBC # UR STRIP.AUTO: NEGATIVE /UL
RBC #/AREA URNS AUTO: NORMAL /HPF
SODIUM SERPL-SCNC: 139 MMOL/L (ref 136–145)
SODIUM UR-SCNC: 26 MMOL/L
SODIUM/CREAT UR-RTO: 24 MMOL/G CREAT
SP GR UR STRIP.AUTO: 1.01
UROBILINOGEN UR STRIP.AUTO-MCNC: NORMAL MG/DL
WBC # BLD AUTO: 3.7 X10*3/UL (ref 4.4–11.3)
WBC #/AREA URNS AUTO: NORMAL /HPF

## 2024-06-06 PROCEDURE — 2500000004 HC RX 250 GENERAL PHARMACY W/ HCPCS (ALT 636 FOR OP/ED): Performed by: STUDENT IN AN ORGANIZED HEALTH CARE EDUCATION/TRAINING PROGRAM

## 2024-06-06 PROCEDURE — 2500000001 HC RX 250 WO HCPCS SELF ADMINISTERED DRUGS (ALT 637 FOR MEDICARE OP): Performed by: STUDENT IN AN ORGANIZED HEALTH CARE EDUCATION/TRAINING PROGRAM

## 2024-06-06 PROCEDURE — 97110 THERAPEUTIC EXERCISES: CPT | Mod: GP | Performed by: PHYSICAL THERAPIST

## 2024-06-06 PROCEDURE — 83735 ASSAY OF MAGNESIUM: CPT | Performed by: STUDENT IN AN ORGANIZED HEALTH CARE EDUCATION/TRAINING PROGRAM

## 2024-06-06 PROCEDURE — 76770 US EXAM ABDO BACK WALL COMP: CPT | Performed by: RADIOLOGY

## 2024-06-06 PROCEDURE — C9113 INJ PANTOPRAZOLE SODIUM, VIA: HCPCS | Performed by: STUDENT IN AN ORGANIZED HEALTH CARE EDUCATION/TRAINING PROGRAM

## 2024-06-06 PROCEDURE — 80053 COMPREHEN METABOLIC PANEL: CPT

## 2024-06-06 PROCEDURE — 1170000001 HC PRIVATE ONCOLOGY ROOM DAILY

## 2024-06-06 PROCEDURE — 81001 URINALYSIS AUTO W/SCOPE: CPT

## 2024-06-06 PROCEDURE — 85025 COMPLETE CBC W/AUTO DIFF WBC: CPT

## 2024-06-06 PROCEDURE — 2500000002 HC RX 250 W HCPCS SELF ADMINISTERED DRUGS (ALT 637 FOR MEDICARE OP, ALT 636 FOR OP/ED): Mod: MUE

## 2024-06-06 PROCEDURE — 2500000001 HC RX 250 WO HCPCS SELF ADMINISTERED DRUGS (ALT 637 FOR MEDICARE OP)

## 2024-06-06 PROCEDURE — 36415 COLL VENOUS BLD VENIPUNCTURE: CPT

## 2024-06-06 PROCEDURE — 76770 US EXAM ABDO BACK WALL COMP: CPT

## 2024-06-06 PROCEDURE — 99233 SBSQ HOSP IP/OBS HIGH 50: CPT

## 2024-06-06 PROCEDURE — 84100 ASSAY OF PHOSPHORUS: CPT | Performed by: STUDENT IN AN ORGANIZED HEALTH CARE EDUCATION/TRAINING PROGRAM

## 2024-06-06 PROCEDURE — 84133 ASSAY OF URINE POTASSIUM: CPT

## 2024-06-06 PROCEDURE — 97530 THERAPEUTIC ACTIVITIES: CPT | Mod: GP | Performed by: PHYSICAL THERAPIST

## 2024-06-06 PROCEDURE — 99223 1ST HOSP IP/OBS HIGH 75: CPT

## 2024-06-06 PROCEDURE — 2500000005 HC RX 250 GENERAL PHARMACY W/O HCPCS: Performed by: STUDENT IN AN ORGANIZED HEALTH CARE EDUCATION/TRAINING PROGRAM

## 2024-06-06 RX ORDER — PANTOPRAZOLE SODIUM 40 MG/1
40 TABLET, DELAYED RELEASE ORAL
Status: DISCONTINUED | OUTPATIENT
Start: 2024-06-06 | End: 2024-06-11 | Stop reason: HOSPADM

## 2024-06-06 RX ADMIN — METOPROLOL SUCCINATE 150 MG: 50 TABLET, EXTENDED RELEASE ORAL at 08:40

## 2024-06-06 RX ADMIN — ASPIRIN 81 MG: 81 TABLET, COATED ORAL at 20:37

## 2024-06-06 RX ADMIN — PANTOPRAZOLE SODIUM 40 MG: 40 TABLET, DELAYED RELEASE ORAL at 16:24

## 2024-06-06 RX ADMIN — DEXTROMETHORPHAN 30 MG: 30 SUSPENSION, EXTENDED RELEASE ORAL at 08:50

## 2024-06-06 RX ADMIN — FERROUS SULFATE TAB 325 MG (65 MG ELEMENTAL FE) 1 TABLET: 325 (65 FE) TAB at 08:40

## 2024-06-06 RX ADMIN — Medication 3 L/MIN: at 20:00

## 2024-06-06 RX ADMIN — Medication 3 L/MIN: at 08:00

## 2024-06-06 RX ADMIN — NYSTATIN 1 APPLICATION: 100000 POWDER TOPICAL at 08:50

## 2024-06-06 RX ADMIN — DEXTROMETHORPHAN 30 MG: 30 SUSPENSION, EXTENDED RELEASE ORAL at 22:27

## 2024-06-06 RX ADMIN — ROSUVASTATIN CALCIUM 10 MG: 10 TABLET, FILM COATED ORAL at 20:37

## 2024-06-06 RX ADMIN — OXYCODONE HYDROCHLORIDE AND ACETAMINOPHEN 1000 MG: 500 TABLET ORAL at 08:40

## 2024-06-06 RX ADMIN — TAMSULOSIN HYDROCHLORIDE 0.4 MG: 0.4 CAPSULE ORAL at 08:40

## 2024-06-06 RX ADMIN — PANTOPRAZOLE SODIUM 40 MG: 40 INJECTION, POWDER, FOR SOLUTION INTRAVENOUS at 08:41

## 2024-06-06 RX ADMIN — NYSTATIN 1 APPLICATION: 100000 POWDER TOPICAL at 20:38

## 2024-06-06 RX ADMIN — Medication 5 MG: at 20:42

## 2024-06-06 ASSESSMENT — COGNITIVE AND FUNCTIONAL STATUS - GENERAL
MOVING FROM LYING ON BACK TO SITTING ON SIDE OF FLAT BED WITH BEDRAILS: A LOT
MOVING TO AND FROM BED TO CHAIR: A LOT
STANDING UP FROM CHAIR USING ARMS: A LOT
CLIMB 3 TO 5 STEPS WITH RAILING: TOTAL
MOBILITY SCORE: 10
TOILETING: A LOT
EATING MEALS: A LOT
STANDING UP FROM CHAIR USING ARMS: TOTAL
PERSONAL GROOMING: A LOT
DAILY ACTIVITIY SCORE: 12
MOBILITY SCORE: 7
DRESSING REGULAR LOWER BODY CLOTHING: A LOT
WALKING IN HOSPITAL ROOM: TOTAL
MOVING TO AND FROM BED TO CHAIR: TOTAL
MOVING FROM LYING ON BACK TO SITTING ON SIDE OF FLAT BED WITH BEDRAILS: A LOT
WALKING IN HOSPITAL ROOM: TOTAL
HELP NEEDED FOR BATHING: A LOT
DRESSING REGULAR UPPER BODY CLOTHING: A LOT
CLIMB 3 TO 5 STEPS WITH RAILING: TOTAL
TURNING FROM BACK TO SIDE WHILE IN FLAT BAD: A LOT
TURNING FROM BACK TO SIDE WHILE IN FLAT BAD: TOTAL

## 2024-06-06 ASSESSMENT — PAIN SCALES - GENERAL
PAINLEVEL_OUTOF10: 0 - NO PAIN
PAINLEVEL_OUTOF10: 0 - NO PAIN

## 2024-06-06 ASSESSMENT — PAIN - FUNCTIONAL ASSESSMENT
PAIN_FUNCTIONAL_ASSESSMENT: 0-10
PAIN_FUNCTIONAL_ASSESSMENT: 0-10

## 2024-06-06 NOTE — CARE PLAN
The patient's goals for the shift include      The clinical goals for the shift include patient will remain safe, free from fall and injury by end of shift    0700 6/06/2024      Problem: Fall/Injury  Goal: Pace activities to prevent fatigue by end of the shift  Outcome: Progressing     Problem: Skin  Goal: Promote/optimize nutrition  Outcome: Progressing  Flowsheets (Taken 6/5/2024 2238)  Promote/optimize nutrition:   Monitor/record intake including meals   Offer water/supplements/favorite foods   Consume > 50% meals/supplements  Goal: Decreased wound size/increased tissue granulation at next dressing change  Outcome: Progressing  Flowsheets (Taken 6/5/2024 2238)  Decreased wound size/increased tissue granulation at next dressing change:   Protective dressings over bony prominences   Promote sleep for wound healing  Goal: Participates in plan/prevention/treatment measures  Outcome: Progressing  Flowsheets (Taken 6/5/2024 2238)  Participates in plan/prevention/treatment measures: Elevate heels  Goal: Prevent/manage excess moisture  Outcome: Progressing  Flowsheets (Taken 6/5/2024 2238)  Prevent/manage excess moisture:   Cleanse incontinence/protect with barrier cream   Moisturize dry skin  Goal: Prevent/minimize sheer/friction injuries  Outcome: Progressing  Flowsheets (Taken 6/5/2024 2238)  Prevent/minimize sheer/friction injuries: Use pull sheet  Goal: Promote skin healing  Outcome: Progressing  Flowsheets (Taken 6/5/2024 2238)  Promote skin healing:   Assess skin/pad under line(s)/device(s)   Turn/reposition every 2 hours/use positioning/transfer devices

## 2024-06-06 NOTE — PROGRESS NOTES
"Subjective     Overnight events:  NAEON. Denies any acute symptoms at present.     Objective   Vital signs:  Blood pressure 109/66, pulse 66, temperature 36.7 °C (98.1 °F), temperature source Temporal, resp. rate 18, height 1.727 m (5' 7.99\"), weight 118 kg (260 lb 5.8 oz), SpO2 99%.    I/O last 3 completed shifts:  In: 577.5 (4.9 mL/kg) [P.O.:120; I.V.:57.5 (0.5 mL/kg); Blood:200; IV Piggyback:200]  Out: 530 (4.5 mL/kg) [Urine:530 (0.1 mL/kg/hr)]  Weight: 118.1 kg     Physical Exam  Constitutional:       General: He is not in acute distress.  Eyes:      Extraocular Movements: Extraocular movements intact.   Pulmonary:      Effort: Pulmonary effort is normal.      Breath sounds: No wheezing, rhonchi or rales.      Comments: Decreased breath sounds on left  Abdominal:      General: Bowel sounds are normal. There is distension.      Palpations: Abdomen is soft.      Tenderness: There is no abdominal tenderness.   Musculoskeletal:      Comments: Mild Bilat LE edema 1+   Neurological:      General: No focal deficit present.      Mental Status: He is oriented to person, place, and time.         Relevant Results        Labs:  Last CBC:  Lab Results   Component Value Date    WBC 3.7 (L) 06/06/2024    HGB 7.4 (L) 06/06/2024    HCT 24.0 (L) 06/06/2024    MCV 93 06/06/2024    PLT 97 (L) 06/06/2024       Last RFP:  Lab Results   Component Value Date    GLUCOSE 93 06/06/2024    CALCIUM 8.4 (L) 06/06/2024     06/06/2024    K 4.3 06/06/2024    CO2 23 06/06/2024     06/06/2024    BUN 49 (H) 06/06/2024    CREATININE 3.69 (H) 06/06/2024       Last LFTs:  Lab Results   Component Value Date    ALT 11 06/06/2024    AST 26 06/06/2024    ALKPHOS 56 06/06/2024    BILITOT 0.5 06/06/2024       Last coags:  Lab Results   Component Value Date    INR 1.1 05/26/2024    APTT 28 05/30/2024       Micro/culture data:  No results found for the last 90 days.      Imaging:  ECG 12 Lead  Atrial flutter with variable AV block with premature " ventricular or aberrantly conducted complexes  Low voltage QRS  Abnormal ECG  When compared with ECG of 28-MAY-2024 01:40,  Atrial flutter has replaced Atrial fibrillation  Nonspecific T wave abnormality, improved in Inferior leads  Confirmed by Jovanny Loera ([id number]) on 5/31/2024 1:51:33 PM  Flexible Sigmoidoscopy  Table formatting from the original result was not included.  Impression  Dark red blood visualized in the rectum. Blood was cleared without any   active bleeding appreciated nor underlying mucosal changes.   Few small and large, scattered diverticula with no inflammation in the   sigmoid colon; no bleeding was identified. Diverticula were irrigated   without any bleeding or stigmata of recent bleed appreciated.  All observed locations appeared normal, including the sigmoid colon,   rectosigmoid and rectum. No mucosal abnormalities. Normal on retroflexion.  Green bilious stool proximal to the rectum, most notably in the proximal   descending colon and transverse colon, without any blood, blood clots or   hematin.    Findings  Dark red blood visualized in the rectum. Blood was cleared without any   active bleeding appreciated nor underlying mucosal changes.   Few small and large, scattered diverticula with no inflammation in the   sigmoid colon; no bleeding was identified. Diverticula were irrigated   without any bleeding or stigmata of recent bleed appreciated.  All observed locations appeared normal, including the sigmoid colon,   rectosigmoid and rectum. No mucosal abnormalities. Normal on retroflexion.  Green bilious stool proximal to the rectum, most notably in the proximal   descending colon and transverse colon, without any blood, blood clots or   hematin.    Recommendation   Follow up with primary gastroenterologist    Follow up with inpatient GI consult service, Dr. Valenzuela and Dr. Archibald  Continue on IV PPI BID        Indication  Iron deficiency anemia due to chronic blood loss    Staff  Staff Role    Kaylen Valenzuela MD Proceduralist   Ale Archibald MD    Medications  micafungin (Mycamine) 100 mg in dextrose 5% 100 mL IV 95.24 mg*    *From user-documented volume   fentaNYL PF (Sublimaze) injection  - Omnicell Override Pull Cannot be   calculated*    *Administration dose not documented   midazolam (Versed) injection  - Omnicell Override Pull Cannot be   calculated*    *Administration dose not documented   (Totals for administrations occurring from 1228 to 1404 on 05/24/24)     Preprocedure  A history and physical has been performed, and patient medication   allergies have been reviewed. The patient's tolerance of previous   anesthesia has been reviewed. The risks and benefits of the procedure and   the sedation options and risks were discussed with the patient. All   questions were answered and informed consent obtained.    Details of the Procedure  The patient underwent no sedation. The patient's blood pressure, ECG,   heart rate, level of consciousness, oxygen and respirations were monitored   throughout the procedure. A digital rectal exam was performed. A perianal   exam was performed. The scope was introduced through the anus and advanced   to the transverse colon. Retroflexion was performed in the rectum. The   quality of bowel preparation was evaluated using the [place] Bowel   Preparation Scale with scores of: left colon = 3. Bowel prep was not   adequate. The patient experienced no blood loss. The procedure was not   difficult. The patient tolerated the procedure well. There were no   apparent adverse events.     Events  Procedure Events   Event Event Time   ENDO SCOPE IN TIME 5/24/2024  1:08 PM   ENDO SCOPE OUT TIME 5/24/2024  1:26 PM     Specimens  No specimens collected    Procedure Location  [facility]ensive [facility]  [address] [facility] [address]  [phone]    Referring Provider  Grayson Nichols, APRN-CNP  [address]  [address] [address]    Procedure Provider  Ale Archibald MD  Lower extremity venous duplex left  Narrative: Interpreted By:  Nathan Argueta,  and Chaparrita Randolph   STUDY:  [facility] US LOWER EXTREMITY VENOUS DUPLEX LEFT;  5/30/2024 3:58 pm      INDICATION:  Signs/Symptoms:LLE swelling, eval for DVT.      COMPARISON:  None.      ACCESSION NUMBER(S):  [id number]      ORDERING CLINICIAN:  ALEXANDRA AVILA      TECHNIQUE:  Vascular ultrasound of the left lower extremity was performed.  Real-time compression views as well as Gray scale, color Doppler and  spectral Doppler waveform analysis was performed.      FINDINGS:  Evaluation of the visualized portions of the left common femoral  vein, proximal, mid, and distal femoral vein, and popliteal vein were  performed.  Evaluation of the visualized portions of the  posterior  tibial and peroneal veins were also performed. Evaluation of the calf  veins limited due to edema.      Echogenic intraluminal material in the proximal greater saphenous  vein with partial compressibility and color Doppler signal The  evaluated veins demonstrate normal compressibility. There is intact  venous flow demonstrating normal respiratory variability and normal  augmentation of flow with calf compression. Therefore, there is no  ultrasonographic evidence for deep vein thrombosis within the  evaluated veins.      Impression: 1.  No sonographic evidence for deep vein thrombosis within the  evaluated veins of the left lower extremity.  2. Nonocclusive thrombus of the proximal greater saphenous vein, a  superficial vein. Recommend correlation with concern for superficial  thrombophlebitis.      I personally reviewed the images/study and I agree with the findings  as stated by Agustín Cheatham MD. This study was interpreted at  [place], [place].      MACRO:  None      Signed by: Nathan Argueta 5/31/2024 5:32 AM  Dictation workstation:   [id number]          Assessment/Plan   Principal Problem:    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Multi)  Active Problems:    Anemia    Vinicius Arriola is a 88 y.o. male with PMHx of PAD, afib/flutter, hx DVT on warfarin, HTN, CAD s/p stent, mitral regurg, HLD, polycythemia vera, managed by Dr. 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 Presented to [facility] 5/14 with abdominal pain, fever, diarrhea, urinary retention c/f sepsis presumed 2/2 PNA; CT CAP showed known LLL collapse 2/2 mesothelioma, new post L chest wall invasion, and c/f L pulmonary trunk tumor thrombus.  5/23 developed shock and acute hypoxic resp failure requiring MICU transfer for pressors and NIV (airvo), thought 2/2 GI bleeding with melena and coffee ground drainage from NGT. Quickly weaned off pressors, s/p flex sig with blood in rectum but no active bleed. Now transferred back to floor for further mgmt. Since transfer to floor, his course has been complicated by asymptomatic episodes of afib w/ rvr and flutter likely 2/2 to overall deconditioning, anemia, tenuous volume status, and resolving infection ultimately abated with oral Metoprolol. Continues to have down trending anemia, GI consulted without plan for scope.     Updates:   - Repeat UA (turbid, 1+ protein, othwerwise unremarkable), U lytes (notable for Mariella 26, Ucl 16, Cr 106.5), FeNa PreRenal, Pending renal US; Nephrology following, appreciate recommendations.  - Will defer discharge to SNF at this time given worsening renal function.    #History Afib, HLD, CAD, PAD  #History DVT/PE on Warfarin  :: 5/21 TTE showing:  Left ventricular systolic function is hyperdynamic with a 70-75% estimated ejection fraction, Mildly elevated RVSP, Moderately dilated aortic root   Continues to have episodes of Afib w/ RVR occasional Aflut likely multifactorial  - Metop  "tartrate at 50mg TID > Metop Succinate 150 mg qd  - Holding warfarin, aspirin  - Continue home statin  - D/c Heparin ggt    #Anemia  :: Likely 2/2 GIB noted this admission  :: Melena and coffee grounds via NGT noted 5/22  :: Hgb initially 10, decreased to 6.9 on 5/23 (1u pRBC) > slow decrease again to 6.8 on 6/2 (1u pRBC)  :: Fe/TIBC (64/175 & 37%), B12 (1287), B9 (11), Hapto 227 // Retic 6/5 0.3%  - Continue to trend Hgb, platelets goal > 20. Platelet goal liberalized as no active bleeding, if hgb decreasing or bleeding clinically then transfuse to goal 50  - Anemia workup revels Mixed picture KAY but predominately anemia of chronic disease; with markedly reduced retic count.      - Will restart home Iron supplementation ( mg daily)  - GI consulted, no plan for acute intervention.       #Worsening mesothelioma  #Tumor Thrombus  #Mesothelioma with Loculated Pleural Effusions   #Left Hilar Mass, Left Pulmonary Trunk Mass  #History JOVANNI, Pulmonary Embolism  :: primary oncologist Dr. Galvan  :: s/p L VATS w/ decort 5/2022, XRT 9-10/22  :: s/p \"half-dose\" pemfexy on 5/14  :: CT chest 5/18 with significant collapse of left lung, loculated pleural effusions consistent with patient's known mesothelioma.  Also shows ill-defined patchy infiltrate in right lower lobe.  Shows left hilar mass with extension into left pulmonary trunk likely tumor thrombus   - Holding warfarin, continue to monitor   - Dr. Henderson recommends transitioning to Eliquis on discharge, deferring anticoagulation iso thrombocytopenia. Plt stable above 50, resume AC (Heparin ggt; low intensity); plan to transition to Eliquis 2.5mg BID (if tolerating heparin)  - Continuing protocol w/ 1 mg folic acid daily  - Hospice Consult for informational meeting 5/31. Not currently interested in pursuing hospice.    #Tachypnea   #Elevated A-a gradient   #AHRF (Improved)  :: multifactorial, like d/t shunt secondary to malignancy vs deconditioning given prolonged " admission vs intermittent Afib vs v/q mismatch   :: RR 23-44   :: 5/22 ABG: pH 7.53, pCO2 31, pO2 109, Lactate 2.2, FiO2 40.  - On 4L nasal cannula previously requiring intermittent AIRVO to assist with work of breathing.   - gradually wean supplemental O2 for SpO2 goal >90%   - continue abx regimen  - CTM     #Coffee grounds via NGT (placed 5/21), improved   #Melena likely d/t GI bleed  :: KUB 5/21, showing moderate distention of stomach with nonspecific predominantly fluid-filled bowel pattern.  :: 5/20-5/21 patient emesis that is watery, dark brown, malodorous   :: did initially present w/ diarrhea could have been chemo induced vs stool ball  :: INR 4.9>6.3>2.4 >1.2  :: s/p Vitamin K 10 mg  :: KUB w/ dilated bowel loops   - Soft diet, encouraged patient to go slow  - holding ferrous sulfate    - pantoprazole 40 bid   - GI consulted, appreciate recs  -s/p flex sig with no evidence of active bleed              -Will hold off EGD since coffee ground emesis resolved     #Illeus (improved/resolved)  :: KUB 5/26 kub shows distended bowel loops likely ileus, will monitor for improvement with PO diet.   - NG tube clamped; If becoming nauseous or developing vomiting, can unclamp NGT and place to LIWS.  - Tolerating PO and having BM. Will CTM     #Pneumonia, Right lower lobe ill-defined patchy infiltrate (resolved)  :: Resp Cult salivary contamination  :: MRSA nares: negative  :: Strep/Legionella antigens: negative  :: CXR 5/21 showing persistent patchy right infrahilar airspace opacities; improved on serial CXRs  :: 5/22 CT CAP w/o contrast: Interval improvement of the right lower lobe patchy infiltrate  :: s/p Azithro (5/16-5/18)  :: s/p vanc (5/16-5/18) (5/20-5/22) and zosyn (5/16-5/18) (5/20-22)  -eropenem (5/19-5/20) (5/22-25) and micafungin (5/22-25)  -Abx stopped 5/25 given prolonged course with improvement/decline independent of abx, do not suspect active infection currently.       #Fever  #Sepsis most likely d/t  pneumonia  :: at OSH met SIRS criteria (febrile, tachypnea)  :: UA 5/18 with small LE, 3+ bacteria, urine cx 5/21 negative  :: Resp Cult salivary contamination  :: MRSA nares: negative  :: Strep/Legionella antigens: negative  :: c diff and enteric stool panel: negative  :: BC 5/16 NGTD, repeat BC 5/20 NGTD  :: CRP 30.46>42.36  :: CXR 5/21 showing persistent patchy right infrahilar airspace opacities    :: s/p Azithro (5/16-5/18)  :: asymmetric warmth, erythema, edema noted in left lower extremity   :: s/p vanc (5/16-5/18) (5/20-5/25) and zosyn (5/16-5/18) (5/20-22)  :: 5/22 CT CAP w/o contrast: Interval improvement of the right lower lobe patchy infiltrate, Interval flattening of the inferior vena cava which may be seen with hypovolemia, Improved proximal sigmoid diverticulitis.  - repeat blood cultures drawn 5/22, NGTD   - meropenem (5/19-5/20) (5/22-5/25) and micafungin (5/22-5/25); restarted Vanc/Sofy briefly overnight 5/27.     #Lower leg skin changes likely d/t cellulitis   :: previously improved margins of left lower extremity skin changes  - Margins worsened on morning exam 5/30, tender to palpation, slightly warmer than adjacent leg.   - CTM  - Keflex 500 q8 5 day course for cellulitis (5/30-6/3)     #VIK on CKD, likely prerenal d/t fluid status   #CKD (baseline Cr 1.3-1.4)  #urine retention s/p Boyd Catheter Placement by Urology in OSH  #Oliguria (6/4- )  Estimated Creatinine Clearance: 17.9 mL/min (A) (by C-G formula based on SCr of 3.69 mg/dL (H)).  :: US renal 5/16: Nonobstructing 1 cm left renal calculus, no hydronephrosis  :: Cr 1.64>2.26>3.00 (Baseline Cr 1.3-1.4)  :: Urine electrolytes pre-renal, Net negative, hypernatremia with prolonged NPO all suggest pre-renal hypovolemic VIK. Will encourage PO intake  - will eventually need boyd removal and trial  - strict in/out  - tamsulosin 0.4 mg daily  - minimize nephrotoxic medications as able  - boyd removed 5/23/24 (5/16-5/23)  - Trialed Albumin 25  QID (2days) with worsening CrCl > consulted Nephrology.  - Repeat UA (turbid, 1+ protein, othwerwise unremarkable), U lytes (Mariella 26, Ucl 16, Cr 106.5), FeNa PreRenal, Pending renal US  - 75cc/hr NS per     #Pancytopenia, most likely medication induced from pemfexy, resolved  #Thrombocytopenia  :: c/f TTP  ::   :: s/p pemfexy 5/14  :: d dimer 1521, fibrinogen >1000- no c/f dic  :: no schistocytes on smear,   :: filgrastim 480 mcg (5/21-5/22)  :: 5/23 ANC 4130; haptoglobin 400   - CTM. Goal >10  - s/p several units of plts     #Elevated INR  :: INR 4.9>6.3>2.4 >1.2  :: s/p Vitamin K 10 mg  -CTM    #Hospital-acquired Delirium  :: waxing and waning altered mental status  -Delirium precautions  -hold home trazodone for insomnia      Antibiotics:: none  Dispo: SNF.      F: Replete PRN  E: Replete PRN  N: Regular Diet  DVT Ppx: None iso of low Plt  GI Ppx: Pantoprazole   Access/Lines: PIV x3  Forrest External (5/23- )  Abx: None  O2: 2L NC    Pain regimen: Tylenol   GI Laxative: None     Code status: DNR and No Intubation  Emergency contact:Kirsten Arriola (Spouse) [phone]        Ramesh Conley MD  PGY-1 Internal Medicine

## 2024-06-06 NOTE — PROGRESS NOTES
Physical Therapy    Physical Therapy Treatment    Patient Name: Vinicius Arriola  MRN: [id number]  Today's Date: 6/6/2024  Time Calculation  Start Time: 1627  Stop Time: 1705  Time Calculation (min): 38 min    Assessment/Plan   PT Assessment  PT Assessment Results: Decreased strength, Decreased endurance, Impaired balance, Decreased mobility, Decreased safety awareness  Rehab Prognosis: Good  End of Session Communication: Bedside nurse  End of Session Patient Position: Bed, 3 rail up, Alarm off, not on at start of session, Alarm off, caregiver present  PT Plan  Inpatient/Swing Bed or Outpatient: Inpatient  PT Plan  Treatment/Interventions: Bed mobility, Transfer training, Gait training, Balance training, Strengthening, Endurance training, Therapeutic exercise, Therapeutic activity  PT Plan: Skilled PT  PT Frequency: 3 times per week  PT Discharge Recommendations: Moderate intensity level of continued care  PT Recommended Transfer Status: Assist x2  PT - OK to Discharge: Yes      General Visit Information:   PT  Visit  PT Received On: 06/06/24  General  Reason for Referral: admitted to [facility] on 5/16 with nausea/vomiting and urinary retention after half dose chemo the previous day.   Was found to be hypotensive/tachycardic; Significant collapse of the left lung  Past Medical History Relevant to Rehab: PAD, afib/flutter, hx DVT on warfarin, HTN, CAD s/p stent, mitral regurg, HLD, polycythemia vera, BCC s/p excision, SVC thrombus and malignant mesothelioma s/p L VATS with decort 5/2022, XRT 9-10/2022 due to poor performance status and monitored w/ surveillance, w/ disease recurrence/progression s/p half dose chemo  Family/Caregiver Present: Yes (spouse and son)  Prior to Session Communication: Bedside nurse  Patient Position Received: Bed, 3 rail up, Alarm off, not on at start of session  Preferred Learning Style: verbal, visual  General Comment: Pt supine in bed upon arrival and willing to participate inTherapy  session. Motivated to participate.    Subjective   Precautions:  Precautions  Medical Precautions: Oxygen therapy device and L/min, Fall precautions  Vital Signs:  Vital Signs  Heart Rate: 66  SpO2:  (SpO2:94% to 99%)  BP: 127/72    Objective   Pain:  Pain Assessment  Pain Assessment: 0-10  Pain Score: 0 - No pain  Cognition:  Cognition  Orientation Level: Disoriented to time, Disoriented to situation (VC's for date and name of hospital)       Postural Control:  Static Sitting Balance  Static Sitting-Level of Assistance:  (SBA ~16 mins)  Dynamic Sitting Balance  Dynamic Sitting-Balance:  (CGA-Weston)  Static Standing Balance  Static Standing-Level of Assistance:  (ModAx2 with walker)    Treatments:  Therapeutic Exercise  Therapeutic Exercise Performed: Yes  Therapeutic Exercise Activity 1: Seated BLE AROM/AAROM:AP, LAQ, Hip F, and supine AP, QS, GS 1x10 reps. Cues for techniques. Rest as needed in between         Bed Mobility  Bed Mobility: Yes  Bed Mobility 1  Bed Mobility 1: Supine to sitting, Sitting to supine  Level of Assistance 1: Maximum assistance, Moderate verbal cues  Bed Mobility Comments 1: HOB elevated  Bed Mobility 2  Bed Mobility  2: Scooting  Level of Assistance 2:  (MaxAx1 for fwd scooting and Depx2 to HOB, using drawsheet)       Transfers  Transfer: Yes  Transfer 1  Transfer From 1: Sit to, Stand to  Transfer to 1: Sit, Stand  Technique 1: Sit to stand, Stand to sit  Transfer Device 1: Walker  Transfer Level of Assistance 1: Maximum assistance, Moderate verbal cues, Moderate tactile cues  Trials/Comments 1: unable on 1st trial, required L foot block due to foot slding (cues for safe hand placement and sequencing)    Outcome Measures:  [facility] Basic Mobility  Turning from your back to your side while in a flat bed without using bedrails: A lot  Moving from lying on your back to sitting on the side of a flat bed without using bedrails: Total  Moving to and from bed to chair (including a wheelchair):  Total  Standing up from a chair using your arms (e.g. wheelchair or bedside chair): Total  To walk in hospital room: Total  Climbing 3-5 steps with railing: Total  Basic Mobility - Total Score: 7    Education Documentation  Precautions, taught by Lianet Comer PT at 6/6/2024  5:18 PM.  Learner: Patient  Readiness: Acceptance  Method: Explanation, Demonstration  Response: Verbalizes Understanding, Needs Reinforcement    Mobility Training, taught by Lianet Comer PT at 6/6/2024  5:18 PM.  Learner: Patient  Readiness: Acceptance  Method: Explanation, Demonstration  Response: Verbalizes Understanding, Needs Reinforcement      Education Comments  No comments found.        OP EDUCATION:       Encounter Problems       Encounter Problems (Active)       Balance       Pt will tolerate standing for 2 mins with Weston with walker support (Progressing)       Start:  05/28/24    Expected End:  06/10/24               Mobility       Pt will be Weston for ambulation 25 ft with RW (Progressing)       Start:  05/28/24    Expected End:  06/10/24            Pt will be Weston to ascend/descend 3 steps with 1 handrail (Progressing)       Start:  05/28/24    Expected End:  06/10/24               PT Transfers       Pt will be Weston for sit to stand and bed chair transfers with RW (Progressing)       Start:  05/28/24    Expected End:  06/10/24            Pt will be Weston for bed mobility (Progressing)       Start:  05/28/24    Expected End:  06/10/24

## 2024-06-06 NOTE — CONSULTS
Reason For Consult  anemia    History Of Present Illness  Vinicius Arriola is a 88 y.o. male PMH afib/flutter, hx DVT on warfarin, HTN, CAD s/p stent, mitral regurg, HLD, polycythemia vera managed by Dr. Rothman ([facility]), JOVANNI, basal cell ca on face, recurrent mesothelioma s/p VATS (5/2022) and radiation (9-10/2022) and s/p C1 permetrexed 5/14 admitted 5/16 for abdominal pain, fever, diarrhea, urinary retention and c/f sepsis presumed 2/2 PNA.     Mr Arriola has had an extended hospital course. On presentation to OSH labs WBC 11.2  Hgb 13.4 Plt 197. CT CAP showed known LLL collapse 2/2 mesothelioma, new post L chest wall invasion, and c/f L pulmonary trunk tumor thrombus. Transferred to [facility] 5/19. CBC showed downtrending counts WBC 5.5 Hgb 10.1 Plt 135. He continued on atbx, received filgrastim 480 mcg (5/21-5/22). On 5/23 developed shock and acute hypoxic resp failure requiring MICU transfer for pressors and NIV (airvo), thought 2/2 GI bleeding with melena and coffee ground drainage from NGT. Hgb 6.4 --> 6.9. Plts 55 --> 38. S/p flex sig 5/24 with blood in rectum but no active bleed. Transferred to regular floor 5/24 where his course has been complicated by asymptomatic episodes of afib w/ rvr and flutter likely and worsening anemia (~7), GI re-consulted 6/3 and believed anemia most likely is multifactorial in nature and the risk of endoscopic evaluation in this patient with complete collapse of the L lung outweigh the benefits (HDS, hgb stable, and patient is having brown stool). Pt restarted on PO iron. On 6/4 given Hgb dropped to 7.2-> 6.5, heparin gtt was stopped.     Today WBC 3.7 ANC 2.79 Hgb 7.4 Plt 97 Cr 3.69 Tbili 0.5 retic 0.3%  6/3 Hapto 227  6/2 occult blood NEG, folate 11 B12 1.2k  iron 64 TIBC 175 Tsat 37% ferritin 6.6k    Pts wife report that patient with prev diagnosis pof polycythemia. Remotely on phlebotomies, none in the last 2-3ys.     Social History:  Tob: quit 40-50 years ago, 1  "ppd x 10 years.  EtOH: 1 glass of wine daily, beer couple times per week  Illicits: none  Lives with his wife, uses a walker at home, able to take care of himself as reported by the patient  Retired [employer].     Family History:  daughter - colon ca dx 48 yo  paternal uncle - colon ca dx 70s  maternal grandmother - unknown ca dx 70s    OncHx:    Dr Ann    DIAGNOSIS  malignant pleural mesothelioma, epithelioid type     STAGING unknown     CURRENT SITES OF DISEASE L pleura     PRIOR THERAPY  L VATS with pulmonary decortication on 5/22/22  XRT 9/30-10/6/22     Mr. Arriola is an 86 yo with multiple medical comorbidities who first met Dr. Luke Hahn in late April 2022 when he was hospitalized for a traumatic fall and noted to have a L pleural effusion. This resolved with a pigtail catheter, but unfortunately  recurred. He ultimately had L VATS with pulmonary decortication on 5/22/22, course c/b post-op ileus. Dr. Hahn did not note any lesions or studding intraoperatively, but unfortunately random L pleural rind biopsy revealed malignant mesothelioma, epithelioid  type. Due to poor performance status, he received definitive radiation only from 9/30-10/6/22. He was then monitored with surveillance. He had recurrence of pleural effusion which was negative on cytology and treated for pneumonia. Scans without evidence  of recurrence. Lengthy discussion with patient and wife regarding potential ipi/nivo, and he does not want systemic cancer-directed therapy.    Last visit 5/13 with Dr Ann: \"Telephone visit today per patient preference. He had a PET/CT scan done on 4/26/24, which showed the cancer had gone into his lymph nodes. He had been seeing Dr. Rothman for his polycythemia, so they met Dr. Galvan (that's who ordered the PET/CT). They are recommending a clinical trial with chemotherapy. Said he would do half the dose of chemo ... OSH PET/CT evidence of progression of disease, with plans for chemotherapy. Discussed " "that alternative to chemo would be dual checkpoint inhibitors or hospice, and that he can always change his mind if he starts on treatment and is not tolerating. They have decided to try the chemotherapy and see how it goes\". ECOG 3.      Past Medical History  He has a past medical history of Acute embolism and thrombosis of unspecified deep veins of unspecified lower extremity (Multi), Dental disease, Encounter for preprocedural cardiovascular examination (11/02/2021), Obesity, unspecified (07/15/2022), Personal history of diseases of the blood and blood-forming organs and certain disorders involving the immune mechanism, Personal history of other diseases of male genital organs, Personal history of other diseases of the circulatory system, Personal history of other diseases of the circulatory system, Personal history of other diseases of the circulatory system (10/21/2021), Personal history of other diseases of the circulatory system, Personal history of other diseases of the nervous system and sense organs, Personal history of other malignant neoplasm of skin, Personal history of other specified conditions, and Personal history of other venous thrombosis and embolism.    Surgical History  He has a past surgical history that includes Other surgical history (08/20/2019); Other surgical history (08/20/2019); Other surgical history (08/20/2019); Other surgical history (06/10/2022); Other surgical history (11/02/2021); Other surgical history (01/02/2020); Other surgical history (01/02/2020); Other surgical history (05/13/2022); Other surgical history (05/26/2022); Other surgical history (10/21/2021); Other surgical history (11/02/2021); Other surgical history (11/02/2021); Other surgical history (11/02/2021); Other surgical history (11/02/2021); and Other surgical history (11/04/2021).     Social History  He reports that he quit smoking about 57 years ago. His smoking use included cigarettes. He started smoking about " "67 years ago. He has a 10 pack-year smoking history. He has never used smokeless tobacco. He reports current alcohol use of about 4.0 standard drinks of alcohol per week. He reports that he does not use drugs.    Family History  Family History   Problem Relation Name Age of Onset    Accidental death Mother      Coronary artery disease Mother      Other (Cardiac disorder) Father      Dementia Father      Cancer Father      Colon cancer Daughter          Allergies  Benadryl allergy decongestant, Diphenhydramine, and Diphenhydramine hcl    Review of Systems  neg     Physical Exam  Gen: awake, alert, in no acute distress  CV: RRR  Pulm: CTAB, on NC  Abd: soft, NT/ND  Ext: 1+ edema  Skin: warm and dry  Neuro: A&Ox2 (not not time)     Last Recorded Vitals  Blood pressure 103/61, pulse 92, temperature 36.8 °C (98.2 °F), temperature source Temporal, resp. rate 19, height 1.727 m (5' 7.99\"), weight 118 kg (260 lb 5.8 oz), SpO2 96%.    Relevant Results  Lab Results   Component Value Date    WBC 3.7 (L) 06/06/2024    HGB 7.4 (L) 06/06/2024    HCT 24.0 (L) 06/06/2024    MCV 93 06/06/2024    PLT 97 (L) 06/06/2024     Lab Results   Component Value Date    CREATININE 3.69 (H) 06/06/2024    BUN 49 (H) 06/06/2024     06/06/2024    K 4.3 06/06/2024     06/06/2024    CO2 23 06/06/2024     Lab Results   Component Value Date    ALT 11 06/06/2024    AST 26 06/06/2024    ALKPHOS 56 06/06/2024    BILITOT 0.5 06/06/2024     Lab Results   Component Value Date    IRON 64 06/02/2024    TIBC 175 (L) 06/02/2024    FERRITIN 6,680 (H) 06/02/2024     CT C/A/P without contrast 5/22:  IMPRESSION:  Chest  1. Interval improvement of the right lower lobe patchy infiltrate.  2. Again seen left lung collapse, left pleural thickening with invasion into the left posterior chest wall, and small left loculated pleural effusion consistent with patient's known mesothelioma and is  unchanged when compared to prior CT from 05/18/2024.   3. Unchanged " prominent mediastinal and perihilar lymph nodes.  4. Previously noted left pulmonary artery thrombus is not well evaluated on this exam due to beam hardening artifact, positioning of patient's arms, and lack of intravenous contrast.     CT Chest/ abd/pelvis 5/18:  CHEST:  1. Significant collapse of the left lung with heterogenous appearance, pleural thickening and small loculated pleural fluid measuring 7.8 cm consistent with the patient's known mesothelioma  which have worsened from the prior CT scan dated 06/14/2023 and grossly unchanged from 12/10/2023 unenhanced CT scan allowing for differences in techniques. Focus of new left posterior chest wall invasion noted at the level of 9th 10th and 10th 11th rib spaces.  2. Right lower lobe ill-defined patchy infiltrate measuring 2.3 x 1.4 cm. Trace right-sided pleural effusion with surrounding atelectasis.  3. Redemonstrated left hilar ill-defined enhancing fullness appears extending to the left pulmonary trunk likely tumor thrombus and felt less likely bland thrombus which has significantly worsened from 06/14/2023 CT scan.  4. Mildly enlarged multiple mediastinal lymph nodes in the largest in the subcarinal region measuring 1.6 cm, grossly unchanged from prior.      ABDOMEN-PELVIS:  1. Proximal sigmoid diverticulosis with mild wall thickening could be related to episodes of underlying mild uncomplicated diverticulitis 2. Other ancillary findings are unchanged.     CT Abd/pelvis 5/17:  1.  Subtle inflammatory change along the proximal sigmoid colon which may represent a low-grade acute diverticulitis.  No definite perforation or abscess.  2.  Complete collapse and consolidation of the left lung with a loculated effusion at the left lung base, unchanged compared to the prior chest CT and likely consistent with patient's reported  mesothelioma, possibly chronic post treatment change.  3.  Cardiomegaly with chronic changes suggesting COPD.  4.  Cholelithiasis.  5.   Moderate bilateral renal atrophy with nonobstructing 6 mm left  renal calculus.  6.  Additional chronic changes as described.     Flex sig 5/24:  Impression  Blood present in the rectum  Scattered diverticulosis in the sigmoid colon  The sigmoid colon, rectosigmoid and rectum appeared normal.  Green bilious stool coming more proximally, without any blood, blood clots or hematin.  Likely bleeding from rectum or sigmoid, no evidence of active bleeding at this time     Assessment/Plan     Vinicius Arriola is a 88 y.o. male PMH afib/flutter, hx DVT on warfarin, HTN, CAD s/p stent, mitral regurg, HLD, polycythemia vera managed by Dr. 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     Prolonged hospitalization notable for PNA, c/b shock and acute hypoxic resp failure and GI bleed s/p flex sig 5/24 with blood in rectum but no active bleed. Over hospital course, noted to have worsening anemia. On admission Hgb 13, currently ~7, requiring PRN blood transfusions. GI re-evaluation and low concern for continued GI bleed and no indication to repeat endoscopic procedure. Pt currently off AC since 6/4.    Today WBC 3.7 ANC 2.79 Hgb 7.4 Plt 97 Cr 3.69 Tbili 0.5 retic 0.3%  6/3 Hapto 227  6/2 occult blood NEG, folate 11 B12 1.2k  iron 64 TIBC 175 Tsat 37% ferritin 6.6k    Anemia multifactorial from recent chemotherapy, low bone marrow reserve given age, recent GI bleed, inflammation due to malignancy, frequent blood draws.     Recommendations:   -Will fup EPO level  -Avoid excessive blood draws  -Monitor for GIB  -Transfuse for Hgb <7    Seen and discussed with Dr Major.    Yee Keene MD  Hematology Oncology fellow, PGY-5  Pager [phone]

## 2024-06-06 NOTE — CARE PLAN
Problem: Fall/Injury  Goal: Pace activities to prevent fatigue by end of the shift  Outcome: Progressing     Problem: Skin  Goal: Promote/optimize nutrition  Outcome: Progressing  Flowsheets (Taken 6/6/2024 1026)  Promote/optimize nutrition:   Assist with feeding   Monitor/record intake including meals  Goal: Decreased wound size/increased tissue granulation at next dressing change  Outcome: Progressing  Flowsheets (Taken 6/6/2024 1026)  Decreased wound size/increased tissue granulation at next dressing change:   Promote sleep for wound healing   Utilize specialty bed per algorithm   Protective dressings over bony prominences  Goal: Participates in plan/prevention/treatment measures  Outcome: Progressing  Flowsheets (Taken 6/6/2024 1026)  Participates in plan/prevention/treatment measures:   Discuss with provider PT/OT consult   Elevate heels  Goal: Prevent/manage excess moisture  Outcome: Progressing  Flowsheets (Taken 6/6/2024 1026)  Prevent/manage excess moisture:   Monitor for/manage infection if present   Moisturize dry skin   Cleanse incontinence/protect with barrier cream  Goal: Prevent/minimize sheer/friction injuries  Outcome: Progressing  Flowsheets (Taken 6/6/2024 1026)  Prevent/minimize sheer/friction injuries:   Turn/reposition every 2 hours/use positioning/transfer devices   Use pull sheet   Utilize specialty bed per algorithm  Goal: Promote skin healing  Outcome: Progressing  Flowsheets (Taken 6/6/2024 1026)  Promote skin healing:   Assess skin/pad under line(s)/device(s)   Turn/reposition every 2 hours/use positioning/transfer devices

## 2024-06-07 LAB
ALBUMIN SERPL BCP-MCNC: 3 G/DL (ref 3.4–5)
ALP SERPL-CCNC: 58 U/L (ref 33–136)
ALT SERPL W P-5'-P-CCNC: 19 U/L (ref 10–52)
ANION GAP SERPL CALC-SCNC: 14 MMOL/L (ref 10–20)
AST SERPL W P-5'-P-CCNC: 37 U/L (ref 9–39)
BASOPHILS # BLD AUTO: 0.01 X10*3/UL (ref 0–0.1)
BASOPHILS NFR BLD AUTO: 0.3 %
BILIRUB SERPL-MCNC: 0.4 MG/DL (ref 0–1.2)
BUN SERPL-MCNC: 52 MG/DL (ref 6–23)
CALCIUM SERPL-MCNC: 8.3 MG/DL (ref 8.6–10.6)
CHLORIDE SERPL-SCNC: 105 MMOL/L (ref 98–107)
CO2 SERPL-SCNC: 24 MMOL/L (ref 21–32)
CREAT SERPL-MCNC: 3.74 MG/DL (ref 0.5–1.3)
EGFRCR SERPLBLD CKD-EPI 2021: 15 ML/MIN/1.73M*2
EOSINOPHIL # BLD AUTO: 0.37 X10*3/UL (ref 0–0.4)
EOSINOPHIL NFR BLD AUTO: 9.5 %
ERYTHROCYTE [DISTWIDTH] IN BLOOD BY AUTOMATED COUNT: 16.5 % (ref 11.5–14.5)
GLUCOSE SERPL-MCNC: 97 MG/DL (ref 74–99)
HCT VFR BLD AUTO: 22.7 % (ref 41–52)
HGB BLD-MCNC: 7.3 G/DL (ref 13.5–17.5)
IMM GRANULOCYTES # BLD AUTO: 0.12 X10*3/UL (ref 0–0.5)
IMM GRANULOCYTES NFR BLD AUTO: 3.1 % (ref 0–0.9)
LYMPHOCYTES # BLD AUTO: 0.27 X10*3/UL (ref 0.8–3)
LYMPHOCYTES NFR BLD AUTO: 7 %
MAGNESIUM SERPL-MCNC: 1.87 MG/DL (ref 1.6–2.4)
MCH RBC QN AUTO: 29.2 PG (ref 26–34)
MCHC RBC AUTO-ENTMCNC: 32.2 G/DL (ref 32–36)
MCV RBC AUTO: 91 FL (ref 80–100)
MONOCYTES # BLD AUTO: 0.2 X10*3/UL (ref 0.05–0.8)
MONOCYTES NFR BLD AUTO: 5.2 %
NEUTROPHILS # BLD AUTO: 2.91 X10*3/UL (ref 1.6–5.5)
NEUTROPHILS NFR BLD AUTO: 74.9 %
NRBC BLD-RTO: 0 /100 WBCS (ref 0–0)
PHOSPHATE SERPL-MCNC: 4.4 MG/DL (ref 2.5–4.9)
PLATELET # BLD AUTO: 110 X10*3/UL (ref 150–450)
POTASSIUM SERPL-SCNC: 4.5 MMOL/L (ref 3.5–5.3)
PROT SERPL-MCNC: 5.5 G/DL (ref 6.4–8.2)
RBC # BLD AUTO: 2.5 X10*6/UL (ref 4.5–5.9)
SODIUM SERPL-SCNC: 138 MMOL/L (ref 136–145)
WBC # BLD AUTO: 3.9 X10*3/UL (ref 4.4–11.3)

## 2024-06-07 PROCEDURE — 2500000001 HC RX 250 WO HCPCS SELF ADMINISTERED DRUGS (ALT 637 FOR MEDICARE OP)

## 2024-06-07 PROCEDURE — 2500000004 HC RX 250 GENERAL PHARMACY W/ HCPCS (ALT 636 FOR OP/ED)

## 2024-06-07 PROCEDURE — 99232 SBSQ HOSP IP/OBS MODERATE 35: CPT | Performed by: INTERNAL MEDICINE

## 2024-06-07 PROCEDURE — 97535 SELF CARE MNGMENT TRAINING: CPT | Mod: GO | Performed by: OCCUPATIONAL THERAPIST

## 2024-06-07 PROCEDURE — 85025 COMPLETE CBC W/AUTO DIFF WBC: CPT

## 2024-06-07 PROCEDURE — 2500000002 HC RX 250 W HCPCS SELF ADMINISTERED DRUGS (ALT 637 FOR MEDICARE OP, ALT 636 FOR OP/ED): Mod: MUE

## 2024-06-07 PROCEDURE — 97110 THERAPEUTIC EXERCISES: CPT | Mod: GO | Performed by: OCCUPATIONAL THERAPIST

## 2024-06-07 PROCEDURE — 99497 ADVNCD CARE PLAN 30 MIN: CPT | Performed by: NURSE PRACTITIONER

## 2024-06-07 PROCEDURE — 2500000005 HC RX 250 GENERAL PHARMACY W/O HCPCS: Performed by: STUDENT IN AN ORGANIZED HEALTH CARE EDUCATION/TRAINING PROGRAM

## 2024-06-07 PROCEDURE — 99233 SBSQ HOSP IP/OBS HIGH 50: CPT

## 2024-06-07 PROCEDURE — 1170000001 HC PRIVATE ONCOLOGY ROOM DAILY

## 2024-06-07 PROCEDURE — 80053 COMPREHEN METABOLIC PANEL: CPT

## 2024-06-07 PROCEDURE — 99223 1ST HOSP IP/OBS HIGH 75: CPT | Performed by: NURSE PRACTITIONER

## 2024-06-07 PROCEDURE — 84100 ASSAY OF PHOSPHORUS: CPT | Performed by: STUDENT IN AN ORGANIZED HEALTH CARE EDUCATION/TRAINING PROGRAM

## 2024-06-07 PROCEDURE — 83735 ASSAY OF MAGNESIUM: CPT | Performed by: STUDENT IN AN ORGANIZED HEALTH CARE EDUCATION/TRAINING PROGRAM

## 2024-06-07 PROCEDURE — 36415 COLL VENOUS BLD VENIPUNCTURE: CPT

## 2024-06-07 RX ORDER — MAGNESIUM SULFATE 1 G/100ML
1 INJECTION INTRAVENOUS ONCE
Status: COMPLETED | OUTPATIENT
Start: 2024-06-07 | End: 2024-06-07

## 2024-06-07 RX ORDER — TALC
6 POWDER (GRAM) TOPICAL DAILY
Status: DISCONTINUED | OUTPATIENT
Start: 2024-06-07 | End: 2024-06-11 | Stop reason: HOSPADM

## 2024-06-07 RX ORDER — SODIUM CHLORIDE 9 MG/ML
75 INJECTION, SOLUTION INTRAVENOUS CONTINUOUS
Status: ACTIVE | OUTPATIENT
Start: 2024-06-07 | End: 2024-06-07

## 2024-06-07 RX ADMIN — Medication 3 L/MIN: at 08:00

## 2024-06-07 RX ADMIN — ROSUVASTATIN CALCIUM 10 MG: 10 TABLET, FILM COATED ORAL at 20:38

## 2024-06-07 RX ADMIN — OXYCODONE HYDROCHLORIDE AND ACETAMINOPHEN 1000 MG: 500 TABLET ORAL at 09:15

## 2024-06-07 RX ADMIN — NYSTATIN 1 APPLICATION: 100000 POWDER TOPICAL at 20:41

## 2024-06-07 RX ADMIN — TAMSULOSIN HYDROCHLORIDE 0.4 MG: 0.4 CAPSULE ORAL at 09:15

## 2024-06-07 RX ADMIN — SODIUM CHLORIDE 75 ML/HR: 9 INJECTION, SOLUTION INTRAVENOUS at 11:00

## 2024-06-07 RX ADMIN — DEXTROMETHORPHAN 30 MG: 30 SUSPENSION, EXTENDED RELEASE ORAL at 09:15

## 2024-06-07 RX ADMIN — METOPROLOL SUCCINATE 150 MG: 50 TABLET, EXTENDED RELEASE ORAL at 09:14

## 2024-06-07 RX ADMIN — MAGNESIUM SULFATE HEPTAHYDRATE 1 G: 1 INJECTION, SOLUTION INTRAVENOUS at 11:02

## 2024-06-07 RX ADMIN — PANTOPRAZOLE SODIUM 40 MG: 40 TABLET, DELAYED RELEASE ORAL at 16:51

## 2024-06-07 RX ADMIN — Medication 3 L/MIN: at 20:00

## 2024-06-07 RX ADMIN — FERROUS SULFATE TAB 325 MG (65 MG ELEMENTAL FE) 1 TABLET: 325 (65 FE) TAB at 09:30

## 2024-06-07 RX ADMIN — DEXTROMETHORPHAN 30 MG: 30 SUSPENSION, EXTENDED RELEASE ORAL at 22:12

## 2024-06-07 RX ADMIN — ASPIRIN 81 MG: 81 TABLET, COATED ORAL at 20:38

## 2024-06-07 RX ADMIN — Medication 6 MG: at 20:38

## 2024-06-07 RX ADMIN — PANTOPRAZOLE SODIUM 40 MG: 40 TABLET, DELAYED RELEASE ORAL at 05:46

## 2024-06-07 ASSESSMENT — COGNITIVE AND FUNCTIONAL STATUS - GENERAL
MOVING TO AND FROM BED TO CHAIR: TOTAL
STANDING UP FROM CHAIR USING ARMS: TOTAL
PERSONAL GROOMING: A LOT
DAILY ACTIVITIY SCORE: 13
DAILY ACTIVITIY SCORE: 12
TURNING FROM BACK TO SIDE WHILE IN FLAT BAD: A LOT
MOVING FROM LYING ON BACK TO SITTING ON SIDE OF FLAT BED WITH BEDRAILS: A LOT
EATING MEALS: A LOT
HELP NEEDED FOR BATHING: A LOT
DRESSING REGULAR UPPER BODY CLOTHING: A LOT
TOILETING: A LOT
HELP NEEDED FOR BATHING: A LOT
CLIMB 3 TO 5 STEPS WITH RAILING: TOTAL
MOBILITY SCORE: 8
WALKING IN HOSPITAL ROOM: TOTAL
PERSONAL GROOMING: A LOT
DRESSING REGULAR LOWER BODY CLOTHING: A LOT
TOILETING: A LOT
DRESSING REGULAR UPPER BODY CLOTHING: A LOT
DRESSING REGULAR LOWER BODY CLOTHING: A LOT
EATING MEALS: A LITTLE

## 2024-06-07 ASSESSMENT — PAIN SCALES - GENERAL
PAINLEVEL_OUTOF10: 0 - NO PAIN
PAINLEVEL_OUTOF10: 0 - NO PAIN

## 2024-06-07 ASSESSMENT — PAIN - FUNCTIONAL ASSESSMENT
PAIN_FUNCTIONAL_ASSESSMENT: 0-10
PAIN_FUNCTIONAL_ASSESSMENT: 0-10

## 2024-06-07 ASSESSMENT — ACTIVITIES OF DAILY LIVING (ADL)
HOME_MANAGEMENT_TIME_ENTRY: 8
LACK_OF_TRANSPORTATION: NO

## 2024-06-07 NOTE — PROGRESS NOTES
"Subjective     Overnight events:  Evaluated with wife present. Denies any acute events overnight or new symptoms/complaints. Happy to have worked with PT where he was able to stand at the bedside yesterday. Updated the family on the plan.    Objective   Vital signs:  Blood pressure 107/68, pulse 93, temperature 37.2 °C (99 °F), temperature source Temporal, resp. rate 20, height 1.727 m (5' 7.99\"), weight 118 kg (260 lb 5.8 oz), SpO2 97%.    I/O last 3 completed shifts:  In: 957.5 (8.1 mL/kg) [P.O.:900; I.V.:57.5 (0.5 mL/kg)]  Out: 1270 (10.8 mL/kg) [Urine:1270 (0.3 mL/kg/hr)]  Weight: 118.1 kg     Physical Exam  Constitutional:       General: He is not in acute distress.  Eyes:      Extraocular Movements: Extraocular movements intact.   Pulmonary:      Effort: Pulmonary effort is normal.      Breath sounds: No wheezing, rhonchi or rales.      Comments: Decreased breath sounds on left  Abdominal:      General: Bowel sounds are normal. There is distension.      Palpations: Abdomen is soft.      Tenderness: There is no abdominal tenderness.   Musculoskeletal:      Comments: Mild Bilat LE edema 1+   Neurological:      General: No focal deficit present.      Mental Status: He is oriented to person, place, and time.         Relevant Results        Labs:  Last CBC:  Lab Results   Component Value Date    WBC 3.9 (L) 06/07/2024    HGB 7.3 (L) 06/07/2024    HCT 22.7 (L) 06/07/2024    MCV 91 06/07/2024     (L) 06/07/2024       Last RFP:  Lab Results   Component Value Date    GLUCOSE 97 06/07/2024    CALCIUM 8.3 (L) 06/07/2024     06/07/2024    K 4.5 06/07/2024    CO2 24 06/07/2024     06/07/2024    BUN 52 (H) 06/07/2024    CREATININE 3.74 (H) 06/07/2024       Last LFTs:  Lab Results   Component Value Date    ALT 19 06/07/2024    AST 37 06/07/2024    ALKPHOS 58 06/07/2024    BILITOT 0.4 06/07/2024       Last coags:  Lab Results   Component Value Date    INR 1.1 05/26/2024    APTT 28 05/30/2024 "       Micro/culture data:  No results found for the last 90 days.      Imaging:  US renal complete  Narrative: STUDY:  US RENAL COMPLETE;  6/6/2024 5:51 pm      INDICATION:  Signs/Symptoms:VIK.      COMPARISON:  None.      ACCESSION NUMBER(S):  [id number]      ORDERING CLINICIAN:  ADRYAN SIMON      TECHNIQUE:  Multiple images of the kidneys were obtained.      FINDINGS:  RIGHT KIDNEY:  The right kidney measures 11.8 cm in length. The renal cortical  echogenicity is increased. Renal cortical thickness is mildly  decreased. No hydronephrosis is present; no evidence of  nephrolithiasis.      LEFT KIDNEY:  The left kidney is poorly visualized, measuring approximately 11.8  cm. Renal cortical echogenicity is increased. No apparent  hydronephrosis or nephrolithiasis.      BLADDER:  The urinary bladder is unremarkable in appearance.  Poorly visualized small volume free fluid in the left upper quadrant.      Impression: Examination is somewhat limited due to patient body habitus,  positioning. Within this limitation: Increased renal cortical  echogenicity bilaterally which may relate to medical renal disease.  No hydronephrosis or nephrolithiasis.      I personally reviewed the images/study and I agree with the findings  as stated. This study was interpreted at [place], [place].      MACRO:  None          Dictation workstation:   [id number]         Assessment/Plan   Principal Problem:    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Multi)  Active Problems:    Anemia    Vinicius Arriola is a 88 y.o. male with PMHx of PAD, afib/flutter, hx DVT on warfarin, HTN, CAD s/p stent, mitral regurg, HLD, polycythemia vera, managed by Dr. 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 Presented to [employer] 5/14 with abdominal pain, fever, diarrhea,  urinary retention c/f sepsis presumed 2/2 PNA; CT CAP showed known LLL collapse 2/2 mesothelioma, new post L chest wall invasion, and c/f L pulmonary trunk tumor thrombus.  5/23 developed shock and acute hypoxic resp failure requiring MICU transfer for pressors and NIV (airvo), thought 2/2 GI bleeding with melena and coffee ground drainage from NGT. Quickly weaned off pressors, s/p flex sig with blood in rectum but no active bleed. Now transferred back to floor for further mgmt. Since transfer to floor, his course has been complicated by asymptomatic episodes of afib w/ rvr and flutter likely 2/2 to overall deconditioning, anemia, tenuous volume status, and resolving infection ultimately abated with oral Metoprolol. Continues to have down trending anemia, GI consulted without plan for scope.     Updates:   - NS 75cc/hr 12 hrs. Will CTM urine output, possibly post ATN.  - Supportive Onc Consult    #History Afib, HLD, CAD, PAD  #History DVT/PE on Warfarin  :: 5/21 TTE showing:  Left ventricular systolic function is hyperdynamic with a 70-75% estimated ejection fraction, Mildly elevated RVSP, Moderately dilated aortic root   Continues to have episodes of Afib w/ RVR occasional Aflut likely multifactorial  - Metop tartrate at 50mg TID > Metop Succinate 150 mg qd  - Holding warfarin, aspirin  - Continue home statin  - D/c Heparin ggt    #Anemia  :: Likely 2/2 GIB noted this admission  :: Melena and coffee grounds via NGT noted 5/22  :: Hgb initially 10, decreased to 6.9 on 5/23 (1u pRBC) > slow decrease again to 6.8 on 6/2 (1u pRBC)  :: Fe/TIBC (64/175 & 37%), B12 (1287), B9 (11), Hapto 227 // Retic 6/5 0.3%  - Continue to trend Hgb, platelets goal > 20. Platelet goal liberalized as no active bleeding, if hgb decreasing or bleeding clinically then transfuse to goal 50  - Anemia workup revels Mixed picture KAY but predominately anemia of chronic disease; with markedly reduced retic count.      - Will restart home Iron  "supplementation ( mg daily)  - GI consulted, no plan for acute intervention.       #Worsening mesothelioma  #Tumor Thrombus  #Mesothelioma with Loculated Pleural Effusions   #Left Hilar Mass, Left Pulmonary Trunk Mass  #History JOVANNI, Pulmonary Embolism  :: primary oncologist Dr. Galvan  :: s/p L VATS w/ decort 5/2022, XRT 9-10/22  :: s/p \"half-dose\" pemfexy on 5/14  :: CT chest 5/18 with significant collapse of left lung, loculated pleural effusions consistent with patient's known mesothelioma.  Also shows ill-defined patchy infiltrate in right lower lobe.  Shows left hilar mass with extension into left pulmonary trunk likely tumor thrombus   - Holding warfarin, continue to monitor   - Dr. Henderson recommends transitioning to Eliquis on discharge, deferring anticoagulation iso thrombocytopenia. Plt stable above 50, resume AC (Heparin ggt; low intensity); plan to transition to Eliquis 2.5mg BID (if tolerating heparin)  - Continuing protocol w/ 1 mg folic acid daily  - Hospice Consult for informational meeting 5/31. Not currently interested in pursuing hospice.    #Tachypnea   #Elevated A-a gradient   #AHRF (Improved)  :: multifactorial, like d/t shunt secondary to malignancy vs deconditioning given prolonged admission vs intermittent Afib vs v/q mismatch   :: RR 23-44   :: 5/22 ABG: pH 7.53, pCO2 31, pO2 109, Lactate 2.2, FiO2 40.  - On 4L nasal cannula previously requiring intermittent AIRVO to assist with work of breathing.   - gradually wean supplemental O2 for SpO2 goal >90%   - continue abx regimen  - CTM     #Coffee grounds via NGT (placed 5/21), improved   #Melena likely d/t GI bleed  :: KUB 5/21, showing moderate distention of stomach with nonspecific predominantly fluid-filled bowel pattern.  :: 5/20-5/21 patient emesis that is watery, dark brown, malodorous   :: did initially present w/ diarrhea could have been chemo induced vs stool ball  :: INR 4.9>6.3>2.4 >1.2  :: s/p Vitamin K 10 mg  :: KUB w/ dilated " bowel loops   - Soft diet, encouraged patient to go slow  - holding ferrous sulfate    - pantoprazole 40 bid   - GI consulted, appreciate recs  -s/p flex sig with no evidence of active bleed              -Will hold off EGD since coffee ground emesis resolved     #Illeus (improved/resolved)  :: KUB 5/26 kub shows distended bowel loops likely ileus, will monitor for improvement with PO diet.   - NG tube clamped; If becoming nauseous or developing vomiting, can unclamp NGT and place to LIWS.  - Tolerating PO and having BM. Will CTM     #Pneumonia, Right lower lobe ill-defined patchy infiltrate (resolved)  :: Resp Cult salivary contamination  :: MRSA nares: negative  :: Strep/Legionella antigens: negative  :: CXR 5/21 showing persistent patchy right infrahilar airspace opacities; improved on serial CXRs  :: 5/22 CT CAP w/o contrast: Interval improvement of the right lower lobe patchy infiltrate  :: s/p Azithro (5/16-5/18)  :: s/p vanc (5/16-5/18) (5/20-5/22) and zosyn (5/16-5/18) (5/20-22)  -eropenem (5/19-5/20) (5/22-25) and micafungin (5/22-25)  -Abx stopped 5/25 given prolonged course with improvement/decline independent of abx, do not suspect active infection currently.       #Fever  #Sepsis most likely d/t pneumonia  :: at OSH met SIRS criteria (febrile, tachypnea)  :: UA 5/18 with small LE, 3+ bacteria, urine cx 5/21 negative  :: Resp Cult salivary contamination  :: MRSA nares: negative  :: Strep/Legionella antigens: negative  :: c diff and enteric stool panel: negative  :: BC 5/16 NGTD, repeat BC 5/20 NGTD  :: CRP 30.46>42.36  :: CXR 5/21 showing persistent patchy right infrahilar airspace opacities    :: s/p Azithro (5/16-5/18)  :: asymmetric warmth, erythema, edema noted in left lower extremity   :: s/p vanc (5/16-5/18) (5/20-5/25) and zosyn (5/16-5/18) (5/20-22)  :: 5/22 CT CAP w/o contrast: Interval improvement of the right lower lobe patchy infiltrate, Interval flattening of the inferior vena cava which may  be seen with hypovolemia, Improved proximal sigmoid diverticulitis.  - repeat blood cultures drawn 5/22, NGTD   - meropenem (5/19-5/20) (5/22-5/25) and micafungin (5/22-5/25); restarted Vanc/Sofy briefly overnight 5/27.     #Lower leg skin changes likely d/t cellulitis   :: previously improved margins of left lower extremity skin changes  - Margins worsened on morning exam 5/30, tender to palpation, slightly warmer than adjacent leg.   - CTM  - Keflex 500 q8 5 day course for cellulitis (5/30-6/3)     #VIK on CKD, likely prerenal d/t fluid status   #CKD (baseline Cr 1.3-1.4)  #urine retention s/p Boyd Catheter Placement by Urology in OSH  #Oliguria (6/4- )  Estimated Creatinine Clearance: 17.7 mL/min (A) (by C-G formula based on SCr of 3.74 mg/dL (H)).  :: US renal 5/16: Nonobstructing 1 cm left renal calculus, no hydronephrosis  :: Cr 1.64>2.26>3.00 (Baseline Cr 1.3-1.4)  :: Urine electrolytes pre-renal, Net negative, hypernatremia with prolonged NPO all suggest pre-renal hypovolemic VIK. Will encourage PO intake  - will eventually need boyd removal and trial  - strict in/out  - tamsulosin 0.4 mg daily  - minimize nephrotoxic medications as able  - boyd removed 5/23/24 (5/16-5/23)  - Trialed Albumin 25 QID (2days) with worsening CrCl > consulted Nephrology.  - Repeat UA (turbid, 1+ protein, othwerwise unremarkable), U lytes (Mariella 26, Ucl 16, Cr 106.5), FeNa PreRenal, Pending renal US  - 75cc/hr NS per     #Pancytopenia, most likely medication induced from pemfexy, resolved  #Thrombocytopenia  :: c/f TTP  ::   :: s/p pemfexy 5/14  :: d dimer 1521, fibrinogen >1000- no c/f dic  :: no schistocytes on smear,   :: filgrastim 480 mcg (5/21-5/22)  :: 5/23 ANC 4130; haptoglobin 400   - CTM. Goal >10  - s/p several units of plts     #Elevated INR  :: INR 4.9>6.3>2.4 >1.2  :: s/p Vitamin K 10 mg  -CTM    #Hospital-acquired Delirium  :: waxing and waning altered mental status  -Delirium precautions  -hold home  trazodone for insomnia      Antibiotics:: none  Dispo: SNF.      F: Replete PRN  E: Replete PRN  N: Regular Diet  DVT Ppx: None iso of low Plt  GI Ppx: Pantoprazole   Access/Lines: PIV x3  Forrest External (5/23- )  Abx: None  O2: 2L NC    Pain regimen: Tylenol   GI Laxative: None     Code status: DNR and No Intubation  Emergency contact:Chelsea Arriolara (Spouse) [phone]        Ramesh Conley MD  PGY-1 Internal Medicine

## 2024-06-07 NOTE — PROGRESS NOTES
06/07/24 1400   Discharge Planning   Living Arrangements Spouse/significant other   Support Systems Spouse/significant other;Children   Type of Residence Private residence   Number of Stairs to Enter Residence 3   Do you have animals or pets at home? No   Who is requesting discharge planning? Provider   Home or Post Acute Services Post acute facilities (Rehab/SNF/etc)   Type of Post Acute Facility Services Skilled nursing   Patient expects to be discharged to: SNF   Does the patient need discharge transport arranged? Yes   RoundTrip coordination needed? Yes   Financial Resource Strain   How hard is it for you to pay for the very basics like food, housing, medical care, and heating? Not very   Housing Stability   In the last 12 months, was there a time when you were not able to pay the mortgage or rent on time? N   In the last 12 months, how many places have you lived? 1   In the last 12 months, was there a time when you did not have a steady place to sleep or slept in a shelter (including now)? N   Transportation Needs   In the past 12 months, has lack of transportation kept you from medical appointments or from getting medications? no   In the past 12 months, has lack of transportation kept you from meetings, work, or from getting things needed for daily living? No     SW updated [facility].  SW will assist with transfer to SNF when medically stable.  Will follow.  MICHELLE Vera

## 2024-06-07 NOTE — PROGRESS NOTES
Vinicius Arriola   88 yAdolpheugenia    @WT@  MRN/Room: [id number]    Subjective:   The patient is comfortable in bed without distress.      Meds:   Current Facility-Administered Medications   Medication Dose Route Frequency Provider Last Rate Last Admin    acetaminophen (Tylenol) oral liquid 650 mg  650 mg oral q4h PRN Ramesh Conley MD        Or    acetaminophen (Tylenol) tablet 650 mg  650 mg oral q4h PRN Ramesh Conley MD        albuterol 2.5 mg /3 mL (0.083 %) nebulizer solution 2.5 mg  2.5 mg nebulization q6h PRN Rhianna Holland MD        ascorbic acid (Vitamin C) tablet 1,000 mg  1,000 mg oral Daily Ramesh Conley MD   1,000 mg at 06/07/24 0915    aspirin EC tablet 81 mg  81 mg oral Nightly Ramesh Conley MD   81 mg at 06/06/24 2037    benzonatate (Tessalon) capsule 100 mg  100 mg oral TID PRN Ramesh Conley MD   100 mg at 06/05/24 0309    carboxymethylcellulose (Refresh Celluvisc) 1 % ophthalmic solution 1 drop  1 drop Both Eyes PRN Rhianna Holland MD        dextromethorphan (Delsym) 30 mg/5 mL liquid 30 mg  30 mg oral q12h ADELSO Coretta Miller MD   30 mg at 06/07/24 0915    diclofenac sodium (Voltaren) 1 % gel 4 g  4 g Topical 4x daily PRN Rhianna Holland MD        ergocalciferol (Vitamin D-2) capsule 1,250 mcg  1,250 mcg oral Every Sunday Ramesh Conley MD   1,250 mcg at 06/02/24 0953    ferrous sulfate (325 mg ferrous sulfate) tablet 1 tablet  65 mg of iron oral Daily with breakfast Ramesh Conley MD   1 tablet at 06/07/24 0930    melatonin tablet 5 mg  5 mg oral Nightly PRN Rhianna Holland MD   5 mg at 06/06/24 2042    metoprolol succinate XL (Toprol-XL) 24 hr tablet 150 mg  150 mg oral Daily Ramesh Conley MD   150 mg at 06/07/24 0914    nitroglycerin (Nitrostat) SL tablet 0.4 mg  0.4 mg sublingual q5 min PRN Ramesh Conley MD        nystatin (Mycostatin) 100,000 unit/gram powder 1 Application  1 Application Topical BID Rhianna Holland MD   1 Application at 06/06/24  2038    ondansetron (Zofran) tablet 4 mg  4 mg oral q8h PRN Rhianna Holland MD        Or    ondansetron (Zofran) injection 4 mg  4 mg intravenous q8h PRN Rhianna Holland MD   4 mg at 05/21/24 0930    oxygen (O2) therapy   inhalation Continuous - Inhalation Rhianna Holland MD   3 L/min at 06/07/24 0800    oxygen (O2) therapy   inhalation Continuous PRN - O2/gases Rhianna Holland MD   4 L/min at 05/24/24 0000    pantoprazole (ProtoNix) EC tablet 40 mg  40 mg oral BID AC Ramesh Conley MD   40 mg at 06/07/24 0546    perflutren protein A microsphere (Optison) injection 0.5 mL  0.5 mL intravenous Once in imaging Rhianna Holland MD        rosuvastatin (Crestor) tablet 10 mg  10 mg oral Nightly Ramesh Conley MD   10 mg at 06/06/24 2037    sodium chloride 0.9% infusion  75 mL/hr intravenous Continuous Ramesh Conley MD 75 mL/hr at 06/07/24 1100 75 mL/hr at 06/07/24 1100    sulfur hexafluoride microsphr (Lumason) injection 24.28 mg  2 mL intravenous Once in imaging Rihanna Holland MD        tamsulosin (Flomax) 24 hr capsule 0.4 mg  0.4 mg oral Daily Ramesh Conley MD   0.4 mg at 06/07/24 0915          ROS:  The patient is awake and oriented. No dizziness or lightheadedness. No chills and no fever. No headaches. No nausea and no vomiting. No shortness of breath. No cough. No sputum. No chest pain. No chest tightness. No abdominal pain. No diarrhea and no constipation. No hematemesis or hemoptysis. No hematuria. No rectal bleeding. No melena. No epistaxis. No urinary symptoms. No flank pain. No leg edema. No leg pain. No weakness. No itching. Overall, the rest of the review of systems is also negative.  12 point review of systems otherwise negative as stated in HPI.        Physical Examination:        Vitals:    06/07/24 1215   BP: 107/68   Pulse: 93   Resp: 20   Temp: 37.2 °C (99 °F)   SpO2: 97%     General: The patient is awake, oriented, and is not in any distress.  Head and Neck: Normocephalic. No  periorbital edema.  Eyes: non-icteric  Respiratory: Symmetric air entry. Symmetric chest expansion.No respiratory distress.  Cardiovascular: Symmetric peripheral pulses.  Skin: No maculopapular rash.  Abdomen: soft, nt/nd  Musculoskeletal: No peripheral edema in both left and right upper extremities.  No edema in either left or right lower extremities.  Neuro Exam: Speech is fluent. Moves extremities.    Imaging:  === 05/19/24 ===    US RENAL COMPLETE ([place])  This result has not been signed. Information might be incomplete.    - Impression -  Examination is somewhat limited due to patient body habitus,  positioning. Within this limitation: Increased renal cortical  echogenicity bilaterally which may relate to medical renal disease.  No hydronephrosis or nephrolithiasis.    I personally reviewed the images/study and I agree with the findings  as stated. This study was interpreted at [place], [place].    MACRO:  None      Dictation workstation:   [id number]       Blood Labs:  Results for orders placed or performed during the hospital encounter of 05/19/24 (from the past 24 hour(s))   Magnesium   Result Value Ref Range    Magnesium 1.87 1.60 - 2.40 mg/dL   Phosphorus   Result Value Ref Range    Phosphorus 4.4 2.5 - 4.9 mg/dL   CBC and Auto Differential   Result Value Ref Range    WBC 3.9 (L) 4.4 - 11.3 x10*3/uL    nRBC 0.0 0.0 - 0.0 /100 WBCs    RBC 2.50 (L) 4.50 - 5.90 x10*6/uL    Hemoglobin 7.3 (L) 13.5 - 17.5 g/dL    Hematocrit 22.7 (L) 41.0 - 52.0 %    MCV 91 80 - 100 fL    MCH 29.2 26.0 - 34.0 pg    MCHC 32.2 32.0 - 36.0 g/dL    RDW 16.5 (H) 11.5 - 14.5 %    Platelets 110 (L) 150 - 450 x10*3/uL    Neutrophils % 74.9 40.0 - 80.0 %    Immature Granulocytes %, Automated 3.1 (H) 0.0 - 0.9 %    Lymphocytes % 7.0 13.0 - 44.0 %    Monocytes % 5.2 2.0 - 10.0 %    Eosinophils % 9.5 0.0 - 6.0 %    Basophils % 0.3 0.0 - 2.0 %    Neutrophils Absolute 2.91 1.60 - 5.50  x10*3/uL    Immature Granulocytes Absolute, Automated 0.12 0.00 - 0.50 x10*3/uL    Lymphocytes Absolute 0.27 (L) 0.80 - 3.00 x10*3/uL    Monocytes Absolute 0.20 0.05 - 0.80 x10*3/uL    Eosinophils Absolute 0.37 0.00 - 0.40 x10*3/uL    Basophils Absolute 0.01 0.00 - 0.10 x10*3/uL   Comprehensive metabolic panel   Result Value Ref Range    Glucose 97 74 - 99 mg/dL    Sodium 138 136 - 145 mmol/L    Potassium 4.5 3.5 - 5.3 mmol/L    Chloride 105 98 - 107 mmol/L    Bicarbonate 24 21 - 32 mmol/L    Anion Gap 14 10 - 20 mmol/L    Urea Nitrogen 52 (H) 6 - 23 mg/dL    Creatinine 3.74 (H) 0.50 - 1.30 mg/dL    eGFR 15 (L) >60 mL/min/1.73m*2    Calcium 8.3 (L) 8.6 - 10.6 mg/dL    Albumin 3.0 (L) 3.4 - 5.0 g/dL    Alkaline Phosphatase 58 33 - 136 U/L    Total Protein 5.5 (L) 6.4 - 8.2 g/dL    AST 37 9 - 39 U/L    Bilirubin, Total 0.4 0.0 - 1.2 mg/dL    ALT 19 10 - 52 U/L      @LABBRIEF(BMPR1A:1,PTH:1,PROTUR:1,PHOS:1,ALBUMIN/CREATINE:1,PROTEIN/CREATINE:1,VIT D:1)@   Lab Results   Component Value Date    GLUCOSE 97 06/07/2024    CALCIUM 8.3 (L) 06/07/2024     06/07/2024    K 4.5 06/07/2024    CO2 24 06/07/2024     06/07/2024    BUN 52 (H) 06/07/2024    CREATININE 3.74 (H) 06/07/2024         Assessment and Plan:  Vinicius Arriola is a 88 y.o. 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 Nephrology consulted due to VIK.      #Acute kidney injury, non-oliguric   VIK likely hemodynamically mediated ATN in setting of shock, hypotension and possible contribution from nephrotoxicity of Pemetrexed. No major change in CR level over he past couple of days. Kidney US did not show any hydro.         RECOMMENDATIONS:  -NS @ 75cc/h   -Strict I and O charting  -Supportive management of VIK for now  -Dose medications for renal function  -Avoid contrast  -No indications for renal replacement therapy at present.               Madhu Tapia MD  Senior Attending Physician  Director of Onco-Nephrology Program  Division of Nephrology & Hypertension  [facility]

## 2024-06-07 NOTE — CONSULTS
SUPPORTIVE AND PALLIATIVE ONCOLOGY CONSULT    Inpatient consult to [facility] Adult Supportive Oncology  Consult performed by: Nakia Montano, APRN-CNP  Consult ordered by: Camryn Vanegas MD        SERVICE DATE: 6/7/2024      PALLIATIVE MEDICINE OUTPATIENT PROVIDER:  None  CURRENT ATTENDING PROVIDER: Camryn Vanegas MD     Medical Oncologist: MD Carter Packer MD Iris Y Sheng, MD   Radiation Oncologist: No care team member to display  Primary Physician: Hardik Velásquez  [phone]    REASON FOR CONSULT/CHIEF CONSULT COMPLAINT: Introduction to Supportive and Palliative Oncology Services    Subjective   HISTORY OF PRESENT ILLNESS: Vinicius Arriola is a 88 y.o. male diagnosed with recurrent mesothelioma. PMH significant for PAD, afib/flutter, hx DVT on warfarin, HTN, CAD s/p stent, mitral regurg, HLD, polycythemia vera, managed by Dr. Rothman ([facility]), JOVANNI, basal cell ca on face, s/p removal and radiation of malignant mesothelioma, S/p L VATS with decort 5/2022 c/b post op ileus, XRT 9-10/2022, now with recurrent mesothelioma s/p C1 permetrexed 5/14 . Admitted 5/19/2024 for further evaluation and management of abdominal pain, fever, diarrhea, urinary retention c/f sepsis. Course complicated by:  5/23 developed shock and acute hypoxic resp failure requiring MICU transfer for pressors and NIV (airvo), thought 2/2 GI bleeding with melena and coffee ground drainage from NGT .Since transfer to floor, his course has been complicated by asymptomatic episodes of afib w/ rvr and flutter likely 2/2 to overall deconditioning, anemia, tenuous volume status, and resolving infection ultimately abated with oral Metoprolol. Continues to have down trending anemia,  Supportive and Palliative Oncology is consulted for Introduction to Supportive and Palliative Oncology Services.     Spoke with pt, his wife Veronique and daughter Liv Luna at bedside today.  Pt endorses being tired.  Otherwise he denies pain, N/V, constipation,  diarrhea, SOB (he is on O2 which he is not on at home.)    Opioid Use  Total 24h OME use:  0    OARRS/PDMP reviewed - no aberrant behavior noted.    Symptom Assessment:  Pain:none  Headache: none  Dizziness:none  Lack of energy: very much  Difficulty sleeping: a little  Worrying: none  Anxiety: none  Depression: none  Pain in mouth/swallowing: none  Dry mouth: none  Taste changes: none  Shortness of breath: somewhat  Lack of appetite: a little   Nausea: none  Vomiting: none  Constipation: none- last BM today  Diarrhea: none  Sore muscles: none  Numbness or tingling in hands/feet/other: none  Weight loss: none    Information obtained from: chart review, interview of patient, interview of family, and discussion with primary team  ______________________________________________________________________     Oncology History    No history exists.       Past Medical History:   Diagnosis Date    Acute embolism and thrombosis of unspecified deep veins of unspecified lower extremity (Multi)     Acute embolism and thrombosis of unspecified deep veins of unspecified lower extremity    Dental disease     Upper and lower dentures    Encounter for preprocedural cardiovascular examination 11/02/2021    Pre-operative cardiovascular examination    Obesity, unspecified 07/15/2022    Class 2 obesity with body mass index (BMI) of 38.0 to 38.9 in adult    Personal history of diseases of the blood and blood-forming organs and certain disorders involving the immune mechanism     History of polycythemia    Personal history of other diseases of male genital organs     History of benign prostatic hyperplasia    Personal history of other diseases of the circulatory system     History of hypertension    Personal history of other diseases of the circulatory system     History of cardiac arrhythmia    Personal history of other diseases of the circulatory system 10/21/2021    History of abnormal electrocardiography    Personal history of other  diseases of the circulatory system     History of essential hypertension    Personal history of other diseases of the nervous system and sense organs     History of sleep apnea    Personal history of other malignant neoplasm of skin     History of malignant neoplasm of skin    Personal history of other specified conditions     History of balance disorder    Personal history of other venous thrombosis and embolism     History of deep venous thrombosis     Past Surgical History:   Procedure Laterality Date    OTHER SURGICAL HISTORY  08/20/2019    Hernia repair    OTHER SURGICAL HISTORY  08/20/2019    Tonsillectomy with adenoidectomy    OTHER SURGICAL HISTORY  08/20/2019    Cataract surgery    OTHER SURGICAL HISTORY  06/10/2022    Pulmonary decortication    OTHER SURGICAL HISTORY  11/02/2021    Knee replacement    OTHER SURGICAL HISTORY  01/02/2020    Cardioversion    OTHER SURGICAL HISTORY  01/02/2020    Finger surgical procedure    OTHER SURGICAL HISTORY  05/13/2022    Cardiac catheterization with stent placement    OTHER SURGICAL HISTORY  05/26/2022    Pulmonary decortication    OTHER SURGICAL HISTORY  10/21/2021    Back surgery    OTHER SURGICAL HISTORY  11/02/2021    Colonoscopy    OTHER SURGICAL HISTORY  11/02/2021    Inguinal hernia repair    OTHER SURGICAL HISTORY  11/02/2021    Trigger finger repair    OTHER SURGICAL HISTORY  11/02/2021    Umbilical hernia repair    OTHER SURGICAL HISTORY  11/04/2021    Carpal tunnel surgery     Family History   Problem Relation Name Age of Onset    Accidental death Mother      Coronary artery disease Mother      Other (Cardiac disorder) Father      Dementia Father      Cancer Father      Colon cancer Daughter          SOCIAL HISTORY:  Pt is  and lives with his wife.  At baseline he ambulates with a walker and is independent of his ADLs.  They have four children.  Social History:  reports that he quit smoking about 57 years ago. His smoking use included cigarettes. He  started smoking about 67 years ago. He has a 10 pack-year smoking history. He has never used smokeless tobacco. He reports current alcohol use of about 4.0 standard drinks of alcohol per week. He reports that he does not use drugs.    REVIEW OF SYSTEMS:  Review of systems negative unless noted in HPI.       Objective       Lab Results   Component Value Date    WBC 3.9 (L) 06/07/2024    HGB 7.3 (L) 06/07/2024    HCT 22.7 (L) 06/07/2024    MCV 91 06/07/2024     (L) 06/07/2024      Lab Results   Component Value Date    GLUCOSE 97 06/07/2024    CALCIUM 8.3 (L) 06/07/2024     06/07/2024    K 4.5 06/07/2024    CO2 24 06/07/2024     06/07/2024    BUN 52 (H) 06/07/2024    CREATININE 3.74 (H) 06/07/2024     Lab Results   Component Value Date    ALT 19 06/07/2024    AST 37 06/07/2024    ALKPHOS 58 06/07/2024    BILITOT 0.4 06/07/2024     Estimated Creatinine Clearance: 17.7 mL/min (A) (by C-G formula based on SCr of 3.74 mg/dL (H)).     Encounter Date: 05/19/24   ECG 12 Lead   Result Value    Ventricular Rate 118    Atrial Rate 278    QRS Duration 80    QT Interval 324    QTC Calculation(Bazett) 454    R Axis 58    T Axis 76    QRS Count 18    Q Onset 226    T Offset 388    QTC Fredericia 406    Narrative    Atrial flutter with variable AV block with premature ventricular or aberrantly conducted complexes  Low voltage QRS  Abnormal ECG  When compared with ECG of 28-MAY-2024 01:40,  Atrial flutter has replaced Atrial fibrillation  Nonspecific T wave abnormality, improved in Inferior leads  Confirmed by Jovanny Loera ([id number]) on 5/31/2024 1:51:33 PM     Wt Readings from Last 5 Encounters:   05/19/24 113 kg (249 lb 9 oz)   02/16/24 114 kg (250 lb 6.4 oz)   12/11/23 115 kg (254 lb 3.1 oz)   12/06/23 116 kg (255 lb)   11/02/23 116 kg (255 lb 4.7 oz)       Current Outpatient Medications   Medication Instructions    ascorbic acid (VITAMIN C) 1,000 mg, oral, Daily    aspirin 81 mg, oral, Nightly     carboxymethylcellulose (Refresh Celluvisc) 1 % ophthalmic solution dropperette 1 drop, ophthalmic (eye), Every morning    ferrous sulfate, 325 mg ferrous sulfate, tablet 1 tablet, oral, 2 times daily (morning and late afternoon)    ferrous sulfate 65 mg, oral, 2 times daily (morning and late afternoon), Do not crush, chew, or split.    furosemide (Lasix) 20 mg tablet 1 tablet, oral, Daily    melatonin 5 mg, oral, Nightly PRN    metoprolol tartrate (LOPRESSOR) 25 mg, oral, 2 times daily    nitroglycerin (Nitrostat) 0.4 mg SL tablet sublingual, Every 5 min PRN    omega-3 fatty acids-fish oil 360-1,200 mg capsule 1 capsule, oral, 2 times daily    potassium chloride CR (Klor-Con) 10 mEq ER tablet 10 mEq, oral, Daily    promethazine-DM (Phenergan-DM) 6.25-15 mg/5 mL syrup 1.25 mL, oral, 4 times daily PRN    rosuvastatin (Crestor) 10 mg tablet 1 tablet, oral, Daily    traZODone (DESYREL) 100 mg, oral, Nightly    warfarin (Coumadin) 1 mg tablet Take 1-2 tablets daily or as directed per [facility]    warfarin (Coumadin) 2 mg tablet TAKE 1 TABLET DAILY OR AS DIRECTED PER [facility]    warfarin (Coumadin) 4 mg tablet TAKE AS DIRECTED Per [facility] Protime Department    ZINC ORAL 1 tablet, oral, Daily     Scheduled medications   ascorbic acid, 1,000 mg, oral, Daily  aspirin, 81 mg, oral, Nightly  dextromethorphan, 30 mg, oral, q12h ADELSO  ergocalciferol, 1,250 mcg, oral, Every Chavez  ferrous sulfate (325 mg ferrous sulfate), 65 mg of iron, oral, Daily with breakfast  metoprolol succinate XL, 150 mg, oral, Daily  nystatin, 1 Application, Topical, BID  oxygen, , inhalation, Continuous - Inhalation  pantoprazole, 40 mg, oral, BID AC  perflutren protein A microsphere, 0.5 mL, intravenous, Once in imaging  rosuvastatin, 10 mg, oral, Nightly  sulfur hexafluoride microsphr, 2 mL, intravenous, Once in imaging  tamsulosin, 0.4 mg, oral, Daily      Continuous medications  sodium chloride 0.9%, 75 mL/hr, Last Rate: 75  mL/hr (06/07/24 1100)      PRN medications  acetaminophen, 650 mg, q4h PRN   Or  acetaminophen, 650 mg, q4h PRN  albuterol, 2.5 mg, q6h PRN  benzonatate, 100 mg, TID PRN  carboxymethylcellulose, 1 drop, PRN  diclofenac sodium, 4 g, 4x daily PRN  melatonin, 5 mg, Nightly PRN  nitroglycerin, 0.4 mg, q5 min PRN  ondansetron, 4 mg, q8h PRN   Or  ondansetron, 4 mg, q8h PRN  oxygen, , Continuous PRN - O2/gases         Allergies:   Allergies   Allergen Reactions    Benadryl Allergy Decongestant Anxiety and Insomnia    Diphenhydramine Anxiety     anxious    Diphenhydramine Hcl Anxiety and Insomnia                PHYSICAL EXAMINATION:  Vital Signs:   Vital signs reviewed  Vitals:    06/07/24 1215   BP: 107/68   Pulse: 93   Resp: 20   Temp: 37.2 °C (99 °F)   SpO2: 97%     Pain Score: 0 - No pain     Physical Exam  Well-developed  M Laying in bed, NAD  A&O x 3, pleasant and cooperative with interview & exam  Breathing comfortably on 2L O2 via NC  Abd soft, obese, NTND, BS +  No edema in ext      ASSESSMENT/PLAN:  89yo M diagnosed with recurrent mesothelioma. PMH significant for PAD, afib/flutter, hx DVT on warfarin, HTN, CAD s/p stent, mitral regurg, HLD, polycythemia vera, managed by Dr. Rothman ([facility]), JOVANNI, basal cell ca on face, s/p removal and radiation of malignant mesothelioma, S/p L VATS with decort 5/2022 c/b post op ileus, XRT 9-10/2022, now with recurrent mesothelioma s/p C1 permetrexed 5/14 . Admitted 5/19/2024 for further evaluation and management of abdominal pain, fever, diarrhea, urinary retention c/f sepsis. Course complicated by:  5/23 developed shock and acute hypoxic resp failure requiring MICU transfer for pressors and NIV (airvo), thought 2/2 GI bleeding with melena and coffee ground drainage from NGT .Since transfer to floor, his course has been complicated by asymptomatic episodes of afib w/ rvr and flutter likely 2/2 to overall deconditioning, anemia, tenuous volume status, and resolving  "infection ultimately abated with oral Metoprolol. Continues to have down trending anemia,  Supportive and Palliative Oncology is consulted for Introduction to Supportive and Palliative Oncology Services.     Pain: denies pain  Continue to monitor pain scores and administer PRN medications as appropriate  Continue/initiate nonpharmacologic pain management strategies including ice/heat therapy, distraction techniques, deep breathing/relaxation techniques, calming music, and repositioning  Continue to monitor for signs of opioid efficacy (pain scores, improved functionality) and toxicity (pinpoint pupils, excess sedation/drowsiness/confusion, respiratory depression, etc.)    Medical Decision Making/Goals of Care/Advance Care Planning:  Patient's current clinical condition, including diagnosis, prognosis, and management plan, and goals of care were discussed.   Life limiting disease: metastatic malignancy  Family: Supportive very involved wife and four children  Performance status: Major  limitations due to fatigue and disease process  Joys/meaning/strength: Family  Understanding of health:   6/7/24:  Demonstrates good understanding of disease process, understands he was initially hospitalized following a new experimental chemo regimen with severe N/V.  Since that time he has continued to have setbacks and has had a prolonged hospital course.  He is no longer pursuing chemo, but does hope to go to rehab to gain enough strength to return home.  They understand his wife would be unable to care for him in his current condition (bedbound.)  He has recently developed VIK which has held up his discharge.  He is currently not requiring dialysis and the team is \"giving his kidneys time to heal.\"  Pt and family previously met with hospice and elected to pursue rehab- but today we went over levels of hospice care (home vs in GIP vs at a SNF.)  We also discussed possibility of dialysis if he kidneys worsen and that could be " temporary or it could be chronic, 3 times a week.  Discussed we have to wait and see how he does.  Everyone participated fully in this conversation and all questions were answered.  Information:Wants full disclosure  Goals: symptom control  Worries and fears now and future: ongoing symptoms and being unable to return home      Advance Directives  Existence of Advance Directives:Yes, documentation or copy in medical record  Decision maker: RAMU is wife Kirsten  Code Status: DNR DNI    Introduction to Supportive and Palliative Oncology:  Spoke with pt, wife and daughter at bedside  Introduced the role and philosophy of Supportive and Palliative oncology in the evaluation and management of symptoms during cancer treatment  Palliative care was introduced as a service for patients with serious illness to help with symptoms, assist with goals of care conversations, navigate complex decision making, improve quality of life for patients, and provide support both patients and families.  Patient seemed to appreciate the extra layer of support.     Supportive Interventions: Interventions: Music Therapy: referral placed, SPO Spiritual Care: referral placed    Disposition:  Please  start the process of having prior authorization with meds to beds deliver medications to patient prior to discharge via [facility] pharmacy. Prescriptions will need to be sent 48-72 hours prior to discharge so that a prior authorization can be completed.     Discharge date: unknown pending acute issues  Will assess if patient needs an appointment with Outpatient Supportive Oncology as appropriate      Signature and billing:  Thank you for allowing us to participate in the care of this patient. Recommendations will be communicated back to the consulting service by way of shared electronic medical record or face-to-face.    Medical complexity was high level due to due to complexity of problems, extensive data review, and high risk of  management/treatment.    I spent 60 minutes in the care of this patient which included chart review, interviewing patient/family, discussion with primary team, coordination of care, and documentation.    Time Statement: Total face to face time spent on advance care planning was 15 minutes with 15 minutes spent in counseling, including the explanation.      DATA   Diagnostic tests and information reviewed for today's visit:  Conversation with primary team, Most recent labs and imaging results, Medications       Some elements copied from KEILY Conley note on 6/6/24, the elements have been updated and all reflect current decision making from today, 6/7/2024.    Plan of Care discussed with: Provider, Patient, and Family/Significant Other: wife, daughter    Thank you for asking Supportive and Palliative Oncology to assist with care of this patient.  We will continue to follow.  Please contact us for additional questions or concerns.      SIGNATURE: ALONDRA Byrd  PAGER/CONTACT:  Contact information:  Supportive and Palliative Oncology  Monday-Friday 8 AM-5 PM  Epic Secure chat or pager [phone].  After hours and weekends:  pager [phone]

## 2024-06-07 NOTE — PROGRESS NOTES
Occupational Therapy    Occupational Therapy    Occupational Therapy Treatment    Name: Vinicius Arriola  MRN: [id number]  : 1935  Date: 24  Room: [address] [place] [address]      Time Calculation  Start Time: 1725  Stop Time: 1751  Time Calculation (min): 26 min    Assessment:  OT Assessment: Patient is highly motivated. He required verbal cues for appopriate rest breaks, and to complete proper breathing techniques to help manage SOB. Family extremely supportive and encouraging. Patient fatigued following exercises and face washing.  Medical Staff Made Aware: Yes  End of Session Communication: Bedside nurse  End of Session Patient Position: Bed, 3 rail up, Alarm off, not on at start of session  Plan:  Treatment Interventions: ADL retraining, Functional transfer training, UE strengthening/ROM, Endurance training, Cognitive reorientation, Patient/family training, Equipment evaluation/education, Compensatory technique education  OT Frequency: 3 times per week  OT Discharge Recommendations: Moderate intensity level of continued care  OT Recommended Transfer Status: Maximum assist, Assist of 1  OT - OK to Discharge: Yes    Subjective   General:  OT Last Visit  OT Received On: 24  Prior to Session Communication: Bedside nurse  Patient Position Received: Bed, 3 rail up, Alarm off, not on at start of session  Family/Caregiver Present: Yes  Caregiver Feedback: wife, daughter,  and son-in-law present   Precautions:  Medical Precautions: Oxygen therapy device and L/min, Fall precautions  Vitals:     Lines/Tubes/Drains:  External Urinary Catheter Male (Active)   Number of days: 15       Cognition:  Orientation Level:  (disoriented to month, oriented to year and place)  Following Commands: Follows one step commands with increased time    Pain Assessment:  Pain Assessment  Pain Assessment: 0-10  Pain Score: 0 - No pain     Objective   Activities of Daily Living:     Grooming  Grooming Level of Assistance:  (Patient required set  up and verbal cues to wash and dry face.)       Therapy/Activity:   Therapeutic Exercise  Therapeutic Exercise Performed:  (Patient completed 2 sets of 10 of each of the following without resistance:)  Therapeutic Exercise Activity 1: curls  Therapeutic Exercise Activity 2: supination/prontation  Therapeutic Exercise Activity 3: forward punches  Therapeutic Exercise Activity 4: shoulder flexion (AAROM required for 1/2 of shoulder flexion exercises on left)    Outcome Measures:  [facility] Daily Activity  Putting on and taking off regular lower body clothing: A lot  Bathing (including washing, rinsing, drying): A lot  Putting on and taking off regular upper body clothing: A lot  Toileting, which includes using toilet, bedpan or urinal: A lot  Taking care of personal grooming such as brushing teeth: A lot  Eating Meals: A lot  Daily Activity - Total Score: 12     Education Documentation  Home Exercise Program, taught by Rica Eduardo OT at 6/7/2024  6:14 PM.  Learner: Family, Patient  Readiness: Acceptance  Method: Explanation  Response: Needs Reinforcement    ADL Training, taught by Rica Eduardo OT at 6/7/2024  6:14 PM.  Learner: Family, Patient  Readiness: Acceptance  Method: Explanation  Response: Needs Reinforcement    Education Comments  No comments found.        Goals:  Encounter Problems       Encounter Problems (Active)       ADLs       Patient will perform UB and LB bathing  with modified independent level of assistance and bedside commode. (Not Progressing)       Start:  05/20/24    Expected End:  06/10/24            Patient with complete lower body dressing with modified independent level of assistance donning and doffing all LE clothes  with PRN adaptive equipment while edge of bed  (Not Progressing)       Start:  05/20/24    Expected End:  06/10/24            Patient will complete toileting including hygiene clothing management/hygiene with minimal assist  level of assistance and grab bars. (Not  Progressing)       Start:  05/20/24    Expected End:  06/10/24               BALANCE       Pt will maintain dynamic standing balance during ADL task with minimal assist  level of assistance in order to demonstrate decreased risk of falling and improved postural control. (Not Progressing)       Start:  05/20/24    Expected End:  06/10/24            Patient will tolerate standing for 4 minutes to minimal assist  level of assistance with least restrictive device in order to improve functional activity tolerance for ADL tasks. (Not Progressing)       Start:  05/20/24    Expected End:  06/10/24               COGNITION/SAFETY       Patient will score WFL on standardized cognitive assessment with min cues and within reasonable time frame (Not Progressing)       Start:  05/20/24    Expected End:  06/10/24               MOBILITY       Patient will perform Functional mobility min Household distances/Community Distances with minimal assist  level of assistance and least restrictive device in order to improve safety and functional mobility. (Not Progressing)       Start:  05/20/24    Expected End:  06/10/24               TRANSFERS       Patient will perform bed mobility minimal assist  level of assistance and bed rails in order to improve safety and independence with mobility (Not Progressing)       Start:  05/20/24    Expected End:  06/10/24                     06/07/24 at 6:16 PM   Rica Eduardo, OT   [phone]

## 2024-06-07 NOTE — CARE PLAN
Problem: Fall/Injury  Goal: Pace activities to prevent fatigue by end of the shift  Outcome: Progressing     Problem: Skin  Goal: Promote/optimize nutrition  Outcome: Progressing  Flowsheets (Taken 6/7/2024 1529)  Promote/optimize nutrition:   Assist with feeding   Monitor/record intake including meals  Goal: Decreased wound size/increased tissue granulation at next dressing change  Outcome: Progressing  Flowsheets (Taken 6/7/2024 1529)  Decreased wound size/increased tissue granulation at next dressing change:   Promote sleep for wound healing   Protective dressings over bony prominences   Utilize specialty bed per algorithm  Goal: Participates in plan/prevention/treatment measures  Outcome: Progressing  Flowsheets (Taken 6/7/2024 1529)  Participates in plan/prevention/treatment measures:   Discuss with provider PT/OT consult   Elevate heels   Increase activity/out of bed for meals  Goal: Prevent/manage excess moisture  Outcome: Progressing  Flowsheets (Taken 6/7/2024 1529)  Prevent/manage excess moisture:   Moisturize dry skin   Cleanse incontinence/protect with barrier cream   Follow provider orders for dressing changes   Monitor for/manage infection if present  Goal: Prevent/minimize sheer/friction injuries  Outcome: Progressing  Flowsheets (Taken 6/7/2024 1529)  Prevent/minimize sheer/friction injuries:   Turn/reposition every 2 hours/use positioning/transfer devices   Use pull sheet   Utilize specialty bed per algorithm  Goal: Promote skin healing  Outcome: Progressing  Flowsheets (Taken 6/7/2024 1529)  Promote skin healing:   Assess skin/pad under line(s)/device(s)   Protective dressings over bony prominences   Rotate device position/do not position patient on device   Turn/reposition every 2 hours/use positioning/transfer devices

## 2024-06-08 LAB
ABO GROUP (TYPE) IN BLOOD: NORMAL
ALBUMIN SERPL BCP-MCNC: 2.9 G/DL (ref 3.4–5)
ALP SERPL-CCNC: 56 U/L (ref 33–136)
ALT SERPL W P-5'-P-CCNC: 22 U/L (ref 10–52)
ANION GAP SERPL CALC-SCNC: 15 MMOL/L (ref 10–20)
ANTIBODY SCREEN: NORMAL
AST SERPL W P-5'-P-CCNC: 41 U/L (ref 9–39)
BASOPHILS # BLD AUTO: 0 X10*3/UL (ref 0–0.1)
BASOPHILS NFR BLD AUTO: 0 %
BILIRUB SERPL-MCNC: 0.4 MG/DL (ref 0–1.2)
BUN SERPL-MCNC: 54 MG/DL (ref 6–23)
CALCIUM SERPL-MCNC: 8.3 MG/DL (ref 8.6–10.6)
CHLORIDE SERPL-SCNC: 105 MMOL/L (ref 98–107)
CO2 SERPL-SCNC: 24 MMOL/L (ref 21–32)
CREAT SERPL-MCNC: 3.64 MG/DL (ref 0.5–1.3)
EGFRCR SERPLBLD CKD-EPI 2021: 16 ML/MIN/1.73M*2
EOSINOPHIL # BLD AUTO: 0.36 X10*3/UL (ref 0–0.4)
EOSINOPHIL NFR BLD AUTO: 8.6 %
ERYTHROCYTE [DISTWIDTH] IN BLOOD BY AUTOMATED COUNT: 16.6 % (ref 11.5–14.5)
GLUCOSE SERPL-MCNC: 95 MG/DL (ref 74–99)
HCT VFR BLD AUTO: 24.4 % (ref 41–52)
HGB BLD-MCNC: 7.5 G/DL (ref 13.5–17.5)
IMM GRANULOCYTES # BLD AUTO: 0.16 X10*3/UL (ref 0–0.5)
IMM GRANULOCYTES NFR BLD AUTO: 3.8 % (ref 0–0.9)
LYMPHOCYTES # BLD AUTO: 0.29 X10*3/UL (ref 0.8–3)
LYMPHOCYTES NFR BLD AUTO: 6.9 %
MAGNESIUM SERPL-MCNC: 1.94 MG/DL (ref 1.6–2.4)
MCH RBC QN AUTO: 28.7 PG (ref 26–34)
MCHC RBC AUTO-ENTMCNC: 30.7 G/DL (ref 32–36)
MCV RBC AUTO: 94 FL (ref 80–100)
MONOCYTES # BLD AUTO: 0.28 X10*3/UL (ref 0.05–0.8)
MONOCYTES NFR BLD AUTO: 6.7 %
NEUTROPHILS # BLD AUTO: 3.09 X10*3/UL (ref 1.6–5.5)
NEUTROPHILS NFR BLD AUTO: 74 %
NRBC BLD-RTO: 0 /100 WBCS (ref 0–0)
PHOSPHATE SERPL-MCNC: 4.3 MG/DL (ref 2.5–4.9)
PLATELET # BLD AUTO: 113 X10*3/UL (ref 150–450)
POTASSIUM SERPL-SCNC: 4.7 MMOL/L (ref 3.5–5.3)
PROT SERPL-MCNC: 5.5 G/DL (ref 6.4–8.2)
RBC # BLD AUTO: 2.61 X10*6/UL (ref 4.5–5.9)
RH FACTOR (ANTIGEN D): NORMAL
SODIUM SERPL-SCNC: 139 MMOL/L (ref 136–145)
WBC # BLD AUTO: 4.2 X10*3/UL (ref 4.4–11.3)

## 2024-06-08 PROCEDURE — 2500000002 HC RX 250 W HCPCS SELF ADMINISTERED DRUGS (ALT 637 FOR MEDICARE OP, ALT 636 FOR OP/ED): Mod: MUE

## 2024-06-08 PROCEDURE — 83735 ASSAY OF MAGNESIUM: CPT | Performed by: STUDENT IN AN ORGANIZED HEALTH CARE EDUCATION/TRAINING PROGRAM

## 2024-06-08 PROCEDURE — 2500000001 HC RX 250 WO HCPCS SELF ADMINISTERED DRUGS (ALT 637 FOR MEDICARE OP)

## 2024-06-08 PROCEDURE — 84100 ASSAY OF PHOSPHORUS: CPT | Performed by: STUDENT IN AN ORGANIZED HEALTH CARE EDUCATION/TRAINING PROGRAM

## 2024-06-08 PROCEDURE — 2500000001 HC RX 250 WO HCPCS SELF ADMINISTERED DRUGS (ALT 637 FOR MEDICARE OP): Performed by: STUDENT IN AN ORGANIZED HEALTH CARE EDUCATION/TRAINING PROGRAM

## 2024-06-08 PROCEDURE — 2500000005 HC RX 250 GENERAL PHARMACY W/O HCPCS: Performed by: STUDENT IN AN ORGANIZED HEALTH CARE EDUCATION/TRAINING PROGRAM

## 2024-06-08 PROCEDURE — 99233 SBSQ HOSP IP/OBS HIGH 50: CPT

## 2024-06-08 PROCEDURE — 2500000004 HC RX 250 GENERAL PHARMACY W/ HCPCS (ALT 636 FOR OP/ED): Performed by: STUDENT IN AN ORGANIZED HEALTH CARE EDUCATION/TRAINING PROGRAM

## 2024-06-08 PROCEDURE — 1170000001 HC PRIVATE ONCOLOGY ROOM DAILY

## 2024-06-08 PROCEDURE — 85025 COMPLETE CBC W/AUTO DIFF WBC: CPT

## 2024-06-08 PROCEDURE — 80053 COMPREHEN METABOLIC PANEL: CPT

## 2024-06-08 PROCEDURE — 86900 BLOOD TYPING SEROLOGIC ABO: CPT

## 2024-06-08 PROCEDURE — 36415 COLL VENOUS BLD VENIPUNCTURE: CPT

## 2024-06-08 PROCEDURE — 86923 COMPATIBILITY TEST ELECTRIC: CPT

## 2024-06-08 RX ADMIN — ASPIRIN 81 MG: 81 TABLET, COATED ORAL at 20:29

## 2024-06-08 RX ADMIN — TAMSULOSIN HYDROCHLORIDE 0.4 MG: 0.4 CAPSULE ORAL at 08:39

## 2024-06-08 RX ADMIN — METOPROLOL SUCCINATE 150 MG: 50 TABLET, EXTENDED RELEASE ORAL at 08:39

## 2024-06-08 RX ADMIN — Medication 6 MG: at 20:29

## 2024-06-08 RX ADMIN — ONDANSETRON 4 MG: 2 INJECTION INTRAMUSCULAR; INTRAVENOUS at 10:46

## 2024-06-08 RX ADMIN — Medication 3 L/MIN: at 08:00

## 2024-06-08 RX ADMIN — OXYCODONE HYDROCHLORIDE AND ACETAMINOPHEN 1000 MG: 500 TABLET ORAL at 08:39

## 2024-06-08 RX ADMIN — DEXTROMETHORPHAN 30 MG: 30 SUSPENSION, EXTENDED RELEASE ORAL at 08:40

## 2024-06-08 RX ADMIN — NYSTATIN 1 APPLICATION: 100000 POWDER TOPICAL at 20:30

## 2024-06-08 RX ADMIN — PANTOPRAZOLE SODIUM 40 MG: 40 TABLET, DELAYED RELEASE ORAL at 05:35

## 2024-06-08 RX ADMIN — DEXTROMETHORPHAN 30 MG: 30 SUSPENSION, EXTENDED RELEASE ORAL at 20:31

## 2024-06-08 RX ADMIN — NYSTATIN 1 APPLICATION: 100000 POWDER TOPICAL at 08:50

## 2024-06-08 RX ADMIN — PANTOPRAZOLE SODIUM 40 MG: 40 TABLET, DELAYED RELEASE ORAL at 16:41

## 2024-06-08 RX ADMIN — FERROUS SULFATE TAB 325 MG (65 MG ELEMENTAL FE) 1 TABLET: 325 (65 FE) TAB at 08:39

## 2024-06-08 RX ADMIN — Medication 3 L/MIN: at 20:00

## 2024-06-08 RX ADMIN — ROSUVASTATIN CALCIUM 10 MG: 10 TABLET, FILM COATED ORAL at 20:31

## 2024-06-08 ASSESSMENT — COGNITIVE AND FUNCTIONAL STATUS - GENERAL
PERSONAL GROOMING: A LOT
CLIMB 3 TO 5 STEPS WITH RAILING: TOTAL
MOVING TO AND FROM BED TO CHAIR: TOTAL
DAILY ACTIVITIY SCORE: 12
HELP NEEDED FOR BATHING: A LOT
TOILETING: A LOT
TOILETING: A LOT
DRESSING REGULAR LOWER BODY CLOTHING: A LOT
STANDING UP FROM CHAIR USING ARMS: TOTAL
MOBILITY SCORE: 9
TURNING FROM BACK TO SIDE WHILE IN FLAT BAD: A LOT
DRESSING REGULAR UPPER BODY CLOTHING: A LOT
HELP NEEDED FOR BATHING: A LOT
EATING MEALS: A LITTLE
MOVING TO AND FROM BED TO CHAIR: A LOT
PERSONAL GROOMING: A LOT
TURNING FROM BACK TO SIDE WHILE IN FLAT BAD: A LOT
WALKING IN HOSPITAL ROOM: TOTAL
DRESSING REGULAR UPPER BODY CLOTHING: A LOT
DAILY ACTIVITIY SCORE: 13
STANDING UP FROM CHAIR USING ARMS: TOTAL
EATING MEALS: A LOT
MOBILITY SCORE: 8
MOVING FROM LYING ON BACK TO SITTING ON SIDE OF FLAT BED WITH BEDRAILS: A LOT
DRESSING REGULAR LOWER BODY CLOTHING: A LOT
MOVING FROM LYING ON BACK TO SITTING ON SIDE OF FLAT BED WITH BEDRAILS: A LOT
WALKING IN HOSPITAL ROOM: TOTAL
CLIMB 3 TO 5 STEPS WITH RAILING: TOTAL

## 2024-06-08 ASSESSMENT — PAIN SCALES - GENERAL
PAINLEVEL_OUTOF10: 0 - NO PAIN

## 2024-06-08 ASSESSMENT — PAIN - FUNCTIONAL ASSESSMENT: PAIN_FUNCTIONAL_ASSESSMENT: 0-10

## 2024-06-08 NOTE — PROGRESS NOTES
Overnight events:  Evaluated with wife present. Denies any acute events overnight or new symptoms/complaints. Happy to have worked with PT where he was able to stand at the bedside yesterday.      Objective   Vital signs:  Vitals:    06/08/24 0957   BP: 97/59   Pulse: 82   Resp: 20   Temp: 37.4 °C (99.3 °F)   SpO2: 97%         Physical Exam  Constitutional:       General: He is not in acute distress.  Eyes:      Extraocular Movements: Extraocular movements intact.   Pulmonary:      Effort: Pulmonary effort is normal.      Breath sounds: No wheezing, rhonchi or rales.      Comments: Decreased breath sounds on left  Abdominal:      General: Bowel sounds are normal. There is distension.      Palpations: Abdomen is soft.      Tenderness: There is no abdominal tenderness.   Musculoskeletal:      Comments: Mild Bilat LE edema 1+   Neurological:      General: No focal deficit present.      Mental Status: He is oriented to person, place, and time.            Relevant Results           Labs:  Results for orders placed or performed during the hospital encounter of 05/19/24 (from the past 24 hour(s))   CBC and Auto Differential   Result Value Ref Range    WBC 4.2 (L) 4.4 - 11.3 x10*3/uL    nRBC 0.0 0.0 - 0.0 /100 WBCs    RBC 2.61 (L) 4.50 - 5.90 x10*6/uL    Hemoglobin 7.5 (L) 13.5 - 17.5 g/dL    Hematocrit 24.4 (L) 41.0 - 52.0 %    MCV 94 80 - 100 fL    MCH 28.7 26.0 - 34.0 pg    MCHC 30.7 (L) 32.0 - 36.0 g/dL    RDW 16.6 (H) 11.5 - 14.5 %    Platelets 113 (L) 150 - 450 x10*3/uL    Neutrophils % 74.0 40.0 - 80.0 %    Immature Granulocytes %, Automated 3.8 (H) 0.0 - 0.9 %    Lymphocytes % 6.9 13.0 - 44.0 %    Monocytes % 6.7 2.0 - 10.0 %    Eosinophils % 8.6 0.0 - 6.0 %    Basophils % 0.0 0.0 - 2.0 %    Neutrophils Absolute 3.09 1.60 - 5.50 x10*3/uL    Immature Granulocytes Absolute, Automated 0.16 0.00 - 0.50 x10*3/uL    Lymphocytes Absolute 0.29 (L) 0.80 - 3.00 x10*3/uL    Monocytes Absolute 0.28 0.05 - 0.80 x10*3/uL     Eosinophils Absolute 0.36 0.00 - 0.40 x10*3/uL    Basophils Absolute 0.00 0.00 - 0.10 x10*3/uL   Type and screen   Result Value Ref Range    ABO TYPE A     Rh TYPE POS     ANTIBODY SCREEN NEG    Magnesium   Result Value Ref Range    Magnesium 1.94 1.60 - 2.40 mg/dL   Phosphorus   Result Value Ref Range    Phosphorus 4.3 2.5 - 4.9 mg/dL   Comprehensive metabolic panel   Result Value Ref Range    Glucose 95 74 - 99 mg/dL    Sodium 139 136 - 145 mmol/L    Potassium 4.7 3.5 - 5.3 mmol/L    Chloride 105 98 - 107 mmol/L    Bicarbonate 24 21 - 32 mmol/L    Anion Gap 15 10 - 20 mmol/L    Urea Nitrogen 54 (H) 6 - 23 mg/dL    Creatinine 3.64 (H) 0.50 - 1.30 mg/dL    eGFR 16 (L) >60 mL/min/1.73m*2    Calcium 8.3 (L) 8.6 - 10.6 mg/dL    Albumin 2.9 (L) 3.4 - 5.0 g/dL    Alkaline Phosphatase 56 33 - 136 U/L    Total Protein 5.5 (L) 6.4 - 8.2 g/dL    AST 41 (H) 9 - 39 U/L    Bilirubin, Total 0.4 0.0 - 1.2 mg/dL    ALT 22 10 - 52 U/L        Micro/culture data:  No results found for the last 90 days.        Imaging:  US renal complete  Narrative: STUDY:  US RENAL COMPLETE;  6/6/2024 5:51 pm      INDICATION:  Signs/Symptoms:VIK.      COMPARISON:  None.      ACCESSION NUMBER(S):  [id number]      ORDERING CLINICIAN:  ADRYAN SIMON      TECHNIQUE:  Multiple images of the kidneys were obtained.      FINDINGS:  RIGHT KIDNEY:  The right kidney measures 11.8 cm in length. The renal cortical  echogenicity is increased. Renal cortical thickness is mildly  decreased. No hydronephrosis is present; no evidence of  nephrolithiasis.      LEFT KIDNEY:  The left kidney is poorly visualized, measuring approximately 11.8  cm. Renal cortical echogenicity is increased. No apparent  hydronephrosis or nephrolithiasis.      BLADDER:  The urinary bladder is unremarkable in appearance.  Poorly visualized small volume free fluid in the left upper quadrant.      Impression: Examination is somewhat limited due to patient body habitus,  positioning. Within this  limitation: Increased renal cortical  echogenicity bilaterally which may relate to medical renal disease.  No hydronephrosis or nephrolithiasis.      I personally reviewed the images/study and I agree with the findings  as stated. This study was interpreted at [facility], [place], [place].      MACRO:  None          Dictation workstation:   [id number]                 Assessment/Plan   Principal Problem:    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Multi)  Active Problems:    Anemia     Vinicius Arriola is a 88 y.o. male with PMHx of PAD, afib/flutter, hx DVT on warfarin, HTN, CAD s/p stent, mitral regurg, HLD, polycythemia vera, managed by Dr. 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 Presented to [place] 5/14 with abdominal pain, fever, diarrhea, urinary retention c/f sepsis presumed 2/2 PNA; CT CAP showed known LLL collapse 2/2 mesothelioma, new post L chest wall invasion, and c/f L pulmonary trunk tumor thrombus.  5/23 developed shock and acute hypoxic resp failure requiring MICU transfer for pressors and NIV (airvo), thought 2/2 GI bleeding with melena and coffee ground drainage from NGT. Quickly weaned off pressors, s/p flex sig with blood in rectum but no active bleed. Now transferred back to floor for further mgmt. Since transfer to floor, his course has been complicated by asymptomatic episodes of afib w/ rvr and flutter likely 2/2 to overall deconditioning, anemia, tenuous volume status, and resolving infection ultimately abated with oral Metoprolol. Continues to have down trending anemia, GI consulted without plan for scope.     Updates:   - Stop IV fluids  -  Updated the family on the plan, ok for SNF (navigator system--if improving refer to onco and possibly treatment if worsening refer to hospice)   - Ready for SNF      "  #History Afib, HLD, CAD, PAD  #History DVT/PE on Warfarin  :: 5/21 TTE showing:  Left ventricular systolic function is hyperdynamic with a 70-75% estimated ejection fraction, Mildly elevated RVSP, Moderately dilated aortic root   Continues to have episodes of Afib w/ RVR occasional Aflut likely multifactorial  - Metop tartrate at 50mg TID > Metop Succinate 150 mg qd  - Holding warfarin, aspirin  - Continue home statin  - D/c Heparin ggt     #Anemia  :: Likely 2/2 GIB noted this admission  :: Melena and coffee grounds via NGT noted 5/22  :: Hgb initially 10, decreased to 6.9 on 5/23 (1u pRBC) > slow decrease again to 6.8 on 6/2 (1u pRBC)  :: Fe/TIBC (64/175 & 37%), B12 (1287), B9 (11), Hapto 227 // Retic 6/5 0.3%  - Continue to trend Hgb, platelets goal > 20. Platelet goal liberalized as no active bleeding, if hgb decreasing or bleeding clinically then transfuse to goal 50  - Anemia workup revels Mixed picture KAY but predominately anemia of chronic disease; with markedly reduced retic count.      - Will restart home Iron supplementation ( mg daily)  - GI consulted, no plan for acute intervention.         #Worsening mesothelioma  #Tumor Thrombus  #Mesothelioma with Loculated Pleural Effusions   #Left Hilar Mass, Left Pulmonary Trunk Mass  #History JOVANNI, Pulmonary Embolism  :: primary oncologist Dr. Galvan  :: s/p L VATS w/ decort 5/2022, XRT 9-10/22  :: s/p \"half-dose\" pemfexy on 5/14  :: CT chest 5/18 with significant collapse of left lung, loculated pleural effusions consistent with patient's known mesothelioma.  Also shows ill-defined patchy infiltrate in right lower lobe.  Shows left hilar mass with extension into left pulmonary trunk likely tumor thrombus   - Holding warfarin, continue to monitor   - Dr. Henderson recommends transitioning to Eliquis on discharge, deferring anticoagulation iso thrombocytopenia. Plt stable above 50, resume AC (Heparin ggt; low intensity); plan to transition to Eliquis 2.5mg BID " (if tolerating heparin)  - Continuing protocol w/ 1 mg folic acid daily  - Hospice Consult for informational meeting 5/31. Not currently interested in pursuing hospice.     #Tachypnea   #Elevated A-a gradient   #AHRF (Improved)  :: multifactorial, like d/t shunt secondary to malignancy vs deconditioning given prolonged admission vs intermittent Afib vs v/q mismatch   :: RR 23-44   :: 5/22 ABG: pH 7.53, pCO2 31, pO2 109, Lactate 2.2, FiO2 40.  - On 4L nasal cannula previously requiring intermittent AIRVO to assist with work of breathing.   - gradually wean supplemental O2 for SpO2 goal >90%   - continue abx regimen  - CTM      #Coffee grounds via NGT (placed 5/21), improved   #Melena likely d/t GI bleed  :: KUB 5/21, showing moderate distention of stomach with nonspecific predominantly fluid-filled bowel pattern.  :: 5/20-5/21 patient emesis that is watery, dark brown, malodorous   :: did initially present w/ diarrhea could have been chemo induced vs stool ball  :: INR 4.9>6.3>2.4 >1.2  :: s/p Vitamin K 10 mg  :: KUB w/ dilated bowel loops   - Soft diet, encouraged patient to go slow  - holding ferrous sulfate    - pantoprazole 40 bid   - GI consulted, appreciate recs  -s/p flex sig with no evidence of active bleed              -Will hold off EGD since coffee ground emesis resolved     #Illeus (improved/resolved)  :: KUB 5/26 kub shows distended bowel loops likely ileus, will monitor for improvement with PO diet.   - NG tube clamped; If becoming nauseous or developing vomiting, can unclamp NGT and place to LIWS.  - Tolerating PO and having BM. Will CTM     #Pneumonia, Right lower lobe ill-defined patchy infiltrate (resolved)  :: Resp Cult salivary contamination  :: MRSA nares: negative  :: Strep/Legionella antigens: negative  :: CXR 5/21 showing persistent patchy right infrahilar airspace opacities; improved on serial CXRs  :: 5/22 CT CAP w/o contrast: Interval improvement of the right lower lobe patchy  infiltrate  :: s/p Azithro (5/16-5/18)  :: s/p vanc (5/16-5/18) (5/20-5/22) and zosyn (5/16-5/18) (5/20-22)  -eropenem (5/19-5/20) (5/22-25) and micafungin (5/22-25)  -Abx stopped 5/25 given prolonged course with improvement/decline independent of abx, do not suspect active infection currently.       #Fever  #Sepsis most likely d/t pneumonia  :: at OSH met SIRS criteria (febrile, tachypnea)  :: UA 5/18 with small LE, 3+ bacteria, urine cx 5/21 negative  :: Resp Cult salivary contamination  :: MRSA nares: negative  :: Strep/Legionella antigens: negative  :: c diff and enteric stool panel: negative  :: BC 5/16 NGTD, repeat BC 5/20 NGTD  :: CRP 30.46>42.36  :: CXR 5/21 showing persistent patchy right infrahilar airspace opacities    :: s/p Azithro (5/16-5/18)  :: asymmetric warmth, erythema, edema noted in left lower extremity   :: s/p vanc (5/16-5/18) (5/20-5/25) and zosyn (5/16-5/18) (5/20-22)  :: 5/22 CT CAP w/o contrast: Interval improvement of the right lower lobe patchy infiltrate, Interval flattening of the inferior vena cava which may be seen with hypovolemia, Improved proximal sigmoid diverticulitis.  - repeat blood cultures drawn 5/22, NGTD   - meropenem (5/19-5/20) (5/22-5/25) and micafungin (5/22-5/25); restarted Vanc/Sofy briefly overnight 5/27.     #Lower leg skin changes likely d/t cellulitis   :: previously improved margins of left lower extremity skin changes  - Margins worsened on morning exam 5/30, tender to palpation, slightly warmer than adjacent leg.   - CTM  - Keflex 500 q8 5 day course for cellulitis (5/30-6/3)     #VIK on CKD, likely prerenal d/t fluid status   #CKD (baseline Cr 1.3-1.4)  #urine retention s/p Forrest Catheter Placement by Urology in OSH  #Oliguria (6/4- )  Estimated Creatinine Clearance: 17.7 mL/min (A) (by C-G formula based on SCr of 3.74 mg/dL (H)).  :: US renal 5/16: Nonobstructing 1 cm left renal calculus, no hydronephrosis  :: Cr 1.64>2.26>3.00 (Baseline Cr 1.3-1.4)  ::  Urine electrolytes pre-renal, Net negative, hypernatremia with prolonged NPO all suggest pre-renal hypovolemic VIK. Will encourage PO intake  - will eventually need boyd removal and trial  - strict in/out  - tamsulosin 0.4 mg daily  - minimize nephrotoxic medications as able  - boyd removed 5/23/24 (5/16-5/23)  - Trialed Albumin 25 QID (2days) with worsening CrCl > consulted Nephrology.  - Repeat UA (turbid, 1+ protein, othwerwise unremarkable), U lytes (Mariella 26, Ucl 16, Cr 106.5), FeNa PreRenal, Pending renal US  - Daily CMP     #Pancytopenia, most likely medication induced from pemfexy, resolved  #Thrombocytopenia  :: c/f TTP  ::   :: s/p pemfexy 5/14  :: d dimer 1521, fibrinogen >1000- no c/f dic  :: no schistocytes on smear,   :: filgrastim 480 mcg (5/21-5/22)  :: 5/23 ANC 4130; haptoglobin 400   - CTM. Goal >10  - s/p several units of plts     #Elevated INR  :: INR 4.9>6.3>2.4 >1.2  :: s/p Vitamin K 10 mg  -CTM     #Hospital-acquired Delirium  :: waxing and waning altered mental status  -Delirium precautions  -hold home trazodone for insomnia      Antibiotics:: none  Dispo: SNF.        F: Replete PRN  E: Replete PRN  N: Regular Diet  DVT Ppx: None iso of low Plt  GI Ppx: Pantoprazole   Access/Lines: PIV x3  Boyd External (5/23- )  Abx: None  O2: 2L NC     Pain regimen: Tylenol   GI Laxative: None      Code status: DNR and No Intubation  Emergency contact:Kirsten Arriola (Spouse) [phone]             Coretta Miller MD

## 2024-06-08 NOTE — CARE PLAN
The clinical goals for the shift include Pt will remain safe and free from injury.      Problem: Skin  Goal: Promote/optimize nutrition  Outcome: Progressing  Flowsheets (Taken 6/7/2024 1529 by Thomas Nix RN)  Promote/optimize nutrition:   Assist with feeding   Monitor/record intake including meals     Problem: Skin  Goal: Prevent/manage excess moisture  Outcome: Progressing  Flowsheets (Taken 6/7/2024 1529 by Thomas Nix RN)  Prevent/manage excess moisture:   Moisturize dry skin   Cleanse incontinence/protect with barrier cream   Follow provider orders for dressing changes   Monitor for/manage infection if present     Problem: Skin  Goal: Prevent/minimize sheer/friction injuries  Outcome: Progressing  Flowsheets (Taken 6/7/2024 1529 by Thomas Nix RN)  Prevent/minimize sheer/friction injuries:   Turn/reposition every 2 hours/use positioning/transfer devices   Use pull sheet   Utilize specialty bed per algorithm     Problem: Skin  Goal: Promote skin healing  Outcome: Progressing  Flowsheets (Taken 6/7/2024 1529 by Thomas Nix RN)  Promote skin healing:   Assess skin/pad under line(s)/device(s)   Protective dressings over bony prominences   Rotate device position/do not position patient on device   Turn/reposition every 2 hours/use positioning/transfer devices

## 2024-06-08 NOTE — CARE PLAN
The clinical goals for the shift include Pt to remain HDS    Over the shift, the patient did make progress toward the following goals.    Problem: Fall/Injury  Goal: Pace activities to prevent fatigue by end of the shift  Outcome: Progressing     Problem: Skin  Goal: Promote/optimize nutrition  Outcome: Progressing  Flowsheets (Taken 6/8/2024 1005)  Promote/optimize nutrition:   Monitor/record intake including meals   Consume > 50% meals/supplements   Offer water/supplements/favorite foods  Goal: Decreased wound size/increased tissue granulation at next dressing change  Outcome: Progressing  Flowsheets (Taken 6/8/2024 1005)  Decreased wound size/increased tissue granulation at next dressing change:   Promote sleep for wound healing   Utilize specialty bed per algorithm  Goal: Participates in plan/prevention/treatment measures  Outcome: Progressing  Flowsheets (Taken 6/8/2024 1005)  Participates in plan/prevention/treatment measures:   Elevate heels   Increase activity/out of bed for meals  Goal: Prevent/manage excess moisture  Outcome: Progressing  Flowsheets (Taken 6/8/2024 1005)  Prevent/manage excess moisture:   Cleanse incontinence/protect with barrier cream   Monitor for/manage infection if present   Moisturize dry skin  Goal: Prevent/minimize sheer/friction injuries  Outcome: Progressing  Flowsheets (Taken 6/8/2024 1005)  Prevent/minimize sheer/friction injuries:   Use pull sheet   Increase activity/out of bed for meals  Goal: Promote skin healing  Outcome: Progressing  Flowsheets (Taken 6/8/2024 1005)  Promote skin healing:   Rotate device position/do not position patient on device   Turn/reposition every 2 hours/use positioning/transfer devices   Assess skin/pad under line(s)/device(s)

## 2024-06-09 LAB
ALBUMIN SERPL BCP-MCNC: 3.1 G/DL (ref 3.4–5)
ALBUMIN SERPL BCP-MCNC: 3.1 G/DL (ref 3.4–5)
ALP SERPL-CCNC: 58 U/L (ref 33–136)
ALT SERPL W P-5'-P-CCNC: 21 U/L (ref 10–52)
ANION GAP SERPL CALC-SCNC: 15 MMOL/L (ref 10–20)
ANION GAP SERPL CALC-SCNC: 16 MMOL/L (ref 10–20)
APPEARANCE UR: ABNORMAL
AST SERPL W P-5'-P-CCNC: 34 U/L (ref 9–39)
BACTERIA #/AREA URNS AUTO: ABNORMAL /HPF
BASOPHILS # BLD AUTO: 0.01 X10*3/UL (ref 0–0.1)
BASOPHILS NFR BLD AUTO: 0.2 %
BILIRUB SERPL-MCNC: 0.4 MG/DL (ref 0–1.2)
BILIRUB UR STRIP.AUTO-MCNC: NEGATIVE MG/DL
BUN SERPL-MCNC: 54 MG/DL (ref 6–23)
BUN SERPL-MCNC: 58 MG/DL (ref 6–23)
CALCIUM SERPL-MCNC: 8.5 MG/DL (ref 8.6–10.6)
CALCIUM SERPL-MCNC: 8.6 MG/DL (ref 8.6–10.6)
CHLORIDE SERPL-SCNC: 105 MMOL/L (ref 98–107)
CHLORIDE SERPL-SCNC: 105 MMOL/L (ref 98–107)
CO2 SERPL-SCNC: 22 MMOL/L (ref 21–32)
CO2 SERPL-SCNC: 23 MMOL/L (ref 21–32)
COLOR UR: YELLOW
CREAT SERPL-MCNC: 3.81 MG/DL (ref 0.5–1.3)
CREAT SERPL-MCNC: 3.92 MG/DL (ref 0.5–1.3)
EGFRCR SERPLBLD CKD-EPI 2021: 14 ML/MIN/1.73M*2
EGFRCR SERPLBLD CKD-EPI 2021: 15 ML/MIN/1.73M*2
EOSINOPHIL # BLD AUTO: 0.25 X10*3/UL (ref 0–0.4)
EOSINOPHIL NFR BLD AUTO: 5.7 %
ERYTHROCYTE [DISTWIDTH] IN BLOOD BY AUTOMATED COUNT: 16.5 % (ref 11.5–14.5)
GLUCOSE SERPL-MCNC: 126 MG/DL (ref 74–99)
GLUCOSE SERPL-MCNC: 91 MG/DL (ref 74–99)
GLUCOSE UR STRIP.AUTO-MCNC: NORMAL MG/DL
HCT VFR BLD AUTO: 24.3 % (ref 41–52)
HGB BLD-MCNC: 7.3 G/DL (ref 13.5–17.5)
IMM GRANULOCYTES # BLD AUTO: 0.15 X10*3/UL (ref 0–0.5)
IMM GRANULOCYTES NFR BLD AUTO: 3.4 % (ref 0–0.9)
KETONES UR STRIP.AUTO-MCNC: NEGATIVE MG/DL
LEUKOCYTE ESTERASE UR QL STRIP.AUTO: ABNORMAL
LYMPHOCYTES # BLD AUTO: 0.4 X10*3/UL (ref 0.8–3)
LYMPHOCYTES NFR BLD AUTO: 9.2 %
MAGNESIUM SERPL-MCNC: 2 MG/DL (ref 1.6–2.4)
MCH RBC QN AUTO: 28.5 PG (ref 26–34)
MCHC RBC AUTO-ENTMCNC: 30 G/DL (ref 32–36)
MCV RBC AUTO: 95 FL (ref 80–100)
MONOCYTES # BLD AUTO: 0.41 X10*3/UL (ref 0.05–0.8)
MONOCYTES NFR BLD AUTO: 9.4 %
MUCOUS THREADS #/AREA URNS AUTO: ABNORMAL /LPF
NEUTROPHILS # BLD AUTO: 3.14 X10*3/UL (ref 1.6–5.5)
NEUTROPHILS NFR BLD AUTO: 72.1 %
NITRITE UR QL STRIP.AUTO: NEGATIVE
NRBC BLD-RTO: 0 /100 WBCS (ref 0–0)
PH UR STRIP.AUTO: 5 [PH]
PHOSPHATE SERPL-MCNC: 4.8 MG/DL (ref 2.5–4.9)
PHOSPHATE SERPL-MCNC: 5 MG/DL (ref 2.5–4.9)
PLATELET # BLD AUTO: 121 X10*3/UL (ref 150–450)
POTASSIUM SERPL-SCNC: 5.3 MMOL/L (ref 3.5–5.3)
POTASSIUM SERPL-SCNC: 5.4 MMOL/L (ref 3.5–5.3)
PROT SERPL-MCNC: 5.8 G/DL (ref 6.4–8.2)
PROT UR STRIP.AUTO-MCNC: ABNORMAL MG/DL
RBC # BLD AUTO: 2.56 X10*6/UL (ref 4.5–5.9)
RBC # UR STRIP.AUTO: NEGATIVE /UL
RBC #/AREA URNS AUTO: ABNORMAL /HPF
SODIUM SERPL-SCNC: 137 MMOL/L (ref 136–145)
SODIUM SERPL-SCNC: 139 MMOL/L (ref 136–145)
SP GR UR STRIP.AUTO: 1.01
UROBILINOGEN UR STRIP.AUTO-MCNC: NORMAL MG/DL
WBC # BLD AUTO: 4.4 X10*3/UL (ref 4.4–11.3)
WBC #/AREA URNS AUTO: ABNORMAL /HPF

## 2024-06-09 PROCEDURE — 2500000001 HC RX 250 WO HCPCS SELF ADMINISTERED DRUGS (ALT 637 FOR MEDICARE OP)

## 2024-06-09 PROCEDURE — 2500000004 HC RX 250 GENERAL PHARMACY W/ HCPCS (ALT 636 FOR OP/ED)

## 2024-06-09 PROCEDURE — 2500000002 HC RX 250 W HCPCS SELF ADMINISTERED DRUGS (ALT 637 FOR MEDICARE OP, ALT 636 FOR OP/ED): Mod: MUE

## 2024-06-09 PROCEDURE — 2500000005 HC RX 250 GENERAL PHARMACY W/O HCPCS: Performed by: STUDENT IN AN ORGANIZED HEALTH CARE EDUCATION/TRAINING PROGRAM

## 2024-06-09 PROCEDURE — 36415 COLL VENOUS BLD VENIPUNCTURE: CPT

## 2024-06-09 PROCEDURE — 84100 ASSAY OF PHOSPHORUS: CPT | Performed by: STUDENT IN AN ORGANIZED HEALTH CARE EDUCATION/TRAINING PROGRAM

## 2024-06-09 PROCEDURE — 2500000002 HC RX 250 W HCPCS SELF ADMINISTERED DRUGS (ALT 637 FOR MEDICARE OP, ALT 636 FOR OP/ED)

## 2024-06-09 PROCEDURE — 81001 URINALYSIS AUTO W/SCOPE: CPT

## 2024-06-09 PROCEDURE — 83735 ASSAY OF MAGNESIUM: CPT | Performed by: STUDENT IN AN ORGANIZED HEALTH CARE EDUCATION/TRAINING PROGRAM

## 2024-06-09 PROCEDURE — 2500000001 HC RX 250 WO HCPCS SELF ADMINISTERED DRUGS (ALT 637 FOR MEDICARE OP): Performed by: STUDENT IN AN ORGANIZED HEALTH CARE EDUCATION/TRAINING PROGRAM

## 2024-06-09 PROCEDURE — 80053 COMPREHEN METABOLIC PANEL: CPT

## 2024-06-09 PROCEDURE — 85025 COMPLETE CBC W/AUTO DIFF WBC: CPT

## 2024-06-09 PROCEDURE — 99233 SBSQ HOSP IP/OBS HIGH 50: CPT

## 2024-06-09 PROCEDURE — 80069 RENAL FUNCTION PANEL: CPT | Mod: CCI

## 2024-06-09 PROCEDURE — 1170000001 HC PRIVATE ONCOLOGY ROOM DAILY

## 2024-06-09 RX ORDER — SODIUM CHLORIDE 9 MG/ML
75 INJECTION, SOLUTION INTRAVENOUS CONTINUOUS
Status: ACTIVE | OUTPATIENT
Start: 2024-06-09 | End: 2024-06-09

## 2024-06-09 RX ADMIN — OXYCODONE HYDROCHLORIDE AND ACETAMINOPHEN 1000 MG: 500 TABLET ORAL at 08:46

## 2024-06-09 RX ADMIN — DEXTROMETHORPHAN 30 MG: 30 SUSPENSION, EXTENDED RELEASE ORAL at 08:47

## 2024-06-09 RX ADMIN — NYSTATIN 1 APPLICATION: 100000 POWDER TOPICAL at 08:47

## 2024-06-09 RX ADMIN — SODIUM CHLORIDE 75 ML/HR: 9 INJECTION, SOLUTION INTRAVENOUS at 08:59

## 2024-06-09 RX ADMIN — ASPIRIN 81 MG: 81 TABLET, COATED ORAL at 21:05

## 2024-06-09 RX ADMIN — TAMSULOSIN HYDROCHLORIDE 0.4 MG: 0.4 CAPSULE ORAL at 08:46

## 2024-06-09 RX ADMIN — Medication 6 MG: at 21:05

## 2024-06-09 RX ADMIN — ERGOCALCIFEROL 1250 MCG: 1.25 CAPSULE ORAL at 08:47

## 2024-06-09 RX ADMIN — SODIUM ZIRCONIUM CYCLOSILICATE 5 G: 5 POWDER, FOR SUSPENSION ORAL at 12:30

## 2024-06-09 RX ADMIN — PANTOPRAZOLE SODIUM 40 MG: 40 TABLET, DELAYED RELEASE ORAL at 16:21

## 2024-06-09 RX ADMIN — FERROUS SULFATE TAB 325 MG (65 MG ELEMENTAL FE) 1 TABLET: 325 (65 FE) TAB at 08:46

## 2024-06-09 RX ADMIN — SODIUM ZIRCONIUM CYCLOSILICATE 5 G: 5 POWDER, FOR SUSPENSION ORAL at 21:05

## 2024-06-09 RX ADMIN — SODIUM ZIRCONIUM CYCLOSILICATE 5 G: 5 POWDER, FOR SUSPENSION ORAL at 16:21

## 2024-06-09 RX ADMIN — Medication 3 L/MIN: at 08:00

## 2024-06-09 RX ADMIN — ROSUVASTATIN CALCIUM 10 MG: 10 TABLET, FILM COATED ORAL at 21:05

## 2024-06-09 RX ADMIN — DEXTROMETHORPHAN 30 MG: 30 SUSPENSION, EXTENDED RELEASE ORAL at 21:05

## 2024-06-09 RX ADMIN — METOPROLOL SUCCINATE 150 MG: 50 TABLET, EXTENDED RELEASE ORAL at 08:52

## 2024-06-09 RX ADMIN — PANTOPRAZOLE SODIUM 40 MG: 40 TABLET, DELAYED RELEASE ORAL at 06:37

## 2024-06-09 RX ADMIN — NYSTATIN 1 APPLICATION: 100000 POWDER TOPICAL at 21:05

## 2024-06-09 RX ADMIN — Medication 3 L/MIN: at 20:00

## 2024-06-09 ASSESSMENT — COGNITIVE AND FUNCTIONAL STATUS - GENERAL
TURNING FROM BACK TO SIDE WHILE IN FLAT BAD: A LOT
MOVING TO AND FROM BED TO CHAIR: A LOT
MOVING FROM LYING ON BACK TO SITTING ON SIDE OF FLAT BED WITH BEDRAILS: A LOT
MOBILITY SCORE: 9
EATING MEALS: A LITTLE
HELP NEEDED FOR BATHING: A LOT
DAILY ACTIVITIY SCORE: 13
DRESSING REGULAR LOWER BODY CLOTHING: A LOT
DRESSING REGULAR UPPER BODY CLOTHING: A LOT
WALKING IN HOSPITAL ROOM: TOTAL
TOILETING: A LOT
PERSONAL GROOMING: A LOT
STANDING UP FROM CHAIR USING ARMS: TOTAL
CLIMB 3 TO 5 STEPS WITH RAILING: TOTAL

## 2024-06-09 ASSESSMENT — PAIN - FUNCTIONAL ASSESSMENT
PAIN_FUNCTIONAL_ASSESSMENT: 0-10
PAIN_FUNCTIONAL_ASSESSMENT: 0-10

## 2024-06-09 ASSESSMENT — PAIN SCALES - GENERAL
PAINLEVEL_OUTOF10: 0 - NO PAIN
PAINLEVEL_OUTOF10: 0 - NO PAIN

## 2024-06-09 NOTE — SIGNIFICANT EVENT
Rapid Response RN Note    RADAR score 7 due to the following VS: T 36 °Celsius; HR 62; RR 21; /61; SPO2 98%.     Reviewed above VS with bedside RN via phone.  Vital signs within patient's current trends.  No acute change in condition.  No interventions by rapid response team indicated at this time.    Staff to page rapid response for any concerns or acute change in condition/VS. Jimenez Caba RN.

## 2024-06-09 NOTE — PROGRESS NOTES
"Subjective     Overnight events:  NAEON. Patient continues to have RADAR pages though vitals at baseline and without acute complaints or symptoms overnight. No cardiopulmonary symptoms at present.     Objective   Vital signs:  Blood pressure 97/61, pulse 83, temperature 36.4 °C (97.5 °F), temperature source Temporal, resp. rate 19, height 1.727 m (5' 7.99\"), weight 118 kg (260 lb 5.8 oz), SpO2 96%.    I/O last 3 completed shifts:  In: 1065 (9 mL/kg) [P.O.:150; I.V.:915 (7.7 mL/kg)]  Out: 985 (8.3 mL/kg) [Urine:985 (0.2 mL/kg/hr)]  Weight: 118.1 kg     Physical Exam  Constitutional:       General: He is not in acute distress.  Eyes:      Extraocular Movements: Extraocular movements intact.   Pulmonary:      Effort: Pulmonary effort is normal.      Breath sounds: No wheezing, rhonchi or rales.      Comments: Decreased breath sounds on left  Abdominal:      General: Bowel sounds are normal. There is distension.      Palpations: Abdomen is soft.      Tenderness: There is no abdominal tenderness.   Musculoskeletal:      Comments: Mild Bilat LE edema 1+   Neurological:      General: No focal deficit present.      Mental Status: He is oriented to person, place, and time.         Relevant Results        Labs:  Last CBC:  Lab Results   Component Value Date    WBC 4.2 (L) 06/08/2024    HGB 7.5 (L) 06/08/2024    HCT 24.4 (L) 06/08/2024    MCV 94 06/08/2024     (L) 06/08/2024       Last RFP:  Lab Results   Component Value Date    GLUCOSE 95 06/08/2024    CALCIUM 8.3 (L) 06/08/2024     06/08/2024    K 4.7 06/08/2024    CO2 24 06/08/2024     06/08/2024    BUN 54 (H) 06/08/2024    CREATININE 3.64 (H) 06/08/2024       Last LFTs:  Lab Results   Component Value Date    ALT 22 06/08/2024    AST 41 (H) 06/08/2024    ALKPHOS 56 06/08/2024    BILITOT 0.4 06/08/2024       Last coags:  Lab Results   Component Value Date    INR 1.1 05/26/2024    APTT 28 05/30/2024       Micro/culture data:  No results found for the last " 90 days.      Imaging:  US renal complete  Narrative: Interpreted By:  Ryan Best and Weaver Jakob   STUDY:  US RENAL COMPLETE;  6/6/2024 5:51 pm      INDICATION:  Signs/Symptoms:VIK.      COMPARISON:  None.      ACCESSION NUMBER(S):  [id number]      ORDERING CLINICIAN:  ADRYAN SIMON      TECHNIQUE:  Multiple images of the kidneys were obtained.      FINDINGS:  RIGHT KIDNEY:  The right kidney measures 11.8 cm in length. The renal cortical  echogenicity is increased. Renal cortical thickness is mildly  decreased. No hydronephrosis is present; no evidence of  nephrolithiasis.      LEFT KIDNEY:  The left kidney is poorly visualized, measuring approximately 11.8  cm. Renal cortical echogenicity is increased. No apparent  hydronephrosis or nephrolithiasis.      BLADDER:  The urinary bladder is unremarkable in appearance.  Poorly visualized small volume free fluid in the left upper quadrant.      Impression: Examination is somewhat limited due to patient body habitus,  positioning. Within this limitation: Increased renal cortical  echogenicity bilaterally which may relate to medical renal disease.  No hydronephrosis or nephrolithiasis.      I personally reviewed the images/study and I agree with the findings  as stated. This study was interpreted at [place], [place].      MACRO:  None      Signed by: Ryan Urena 6/8/2024 3:02 AM  Dictation workstation:   [id number]         Assessment/Plan   Principal Problem:    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Multi)  Active Problems:    Anemia    Vinicius Arriola is a 88 y.o. male with PMHx of PAD, afib/flutter, hx DVT on warfarin, HTN, CAD s/p stent, mitral regurg, HLD, polycythemia vera, managed by Dr. 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  Presented to [facility] 5/14 with abdominal pain, fever, diarrhea, urinary retention c/f sepsis presumed 2/2 PNA; CT CAP showed known LLL collapse 2/2 mesothelioma, new post L chest wall invasion, and c/f L pulmonary trunk tumor thrombus.  5/23 developed shock and acute hypoxic resp failure requiring MICU transfer for pressors and NIV (airvo), thought 2/2 GI bleeding with melena and coffee ground drainage from NGT. Quickly weaned off pressors, s/p flex sig with blood in rectum but no active bleed. Now transferred back to floor for further mgmt. Since transfer to floor, his course has been complicated by asymptomatic episodes of afib w/ rvr and flutter likely 2/2 to overall deconditioning, anemia, tenuous volume status, and resolving infection ultimately abated with oral Metoprolol. Continues to have down trending anemia, GI consulted without plan for scope.     Updates:   - NS 75cc/hr 14 hrs given poor PO intake. Will CTM urine output, possibly post ATN.  - Supportive Onc Consulted, may consider Mirtazapine for appetite.  - Repeat UA/Bladder scan given worsening Cr.    #History Afib, HLD, CAD, PAD  #History DVT/PE on Warfarin  :: 5/21 TTE showing:  Left ventricular systolic function is hyperdynamic with a 70-75% estimated ejection fraction, Mildly elevated RVSP, Moderately dilated aortic root   Continues to have episodes of Afib w/ RVR occasional Aflut likely multifactorial  - Metop tartrate at 50mg TID > Metop Succinate 150 mg qd  - Holding warfarin, aspirin  - Continue home statin  - D/c Heparin ggt    #Anemia  :: Likely 2/2 GIB noted this admission  :: Melena and coffee grounds via NGT noted 5/22  :: Hgb initially 10, decreased to 6.9 on 5/23 (1u pRBC) > slow decrease again to 6.8 on 6/2 (1u pRBC)  :: Fe/TIBC (64/175 & 37%), B12 (1287), B9 (11), Hapto 227 // Retic 6/5 0.3%  - Continue to trend Hgb, platelets goal > 20. Platelet goal liberalized as no active bleeding, if hgb decreasing or bleeding clinically then  "transfuse to goal 50  - Anemia workup revels Mixed picture KAY but predominately anemia of chronic disease; with markedly reduced retic count.      - Will restart home Iron supplementation ( mg daily)  - GI consulted, no plan for acute intervention.       #Worsening mesothelioma  #Tumor Thrombus  #Mesothelioma with Loculated Pleural Effusions   #Left Hilar Mass, Left Pulmonary Trunk Mass  #History JOVANNI, Pulmonary Embolism  :: primary oncologist Dr. Galvan  :: s/p L VATS w/ decort 5/2022, XRT 9-10/22  :: s/p \"half-dose\" pemfexy on 5/14  :: CT chest 5/18 with significant collapse of left lung, loculated pleural effusions consistent with patient's known mesothelioma.  Also shows ill-defined patchy infiltrate in right lower lobe.  Shows left hilar mass with extension into left pulmonary trunk likely tumor thrombus   - Holding warfarin, continue to monitor   - Dr. Henderson recommends transitioning to Eliquis on discharge, deferring anticoagulation iso thrombocytopenia. Plt stable above 50, resume AC (Heparin ggt; low intensity); plan to transition to Eliquis 2.5mg BID (if tolerating heparin)  - Continuing protocol w/ 1 mg folic acid daily  - Hospice Consult for informational meeting 5/31. Not currently interested in pursuing hospice.    #Tachypnea   #Elevated A-a gradient   #AHRF (Improved)  :: multifactorial, like d/t shunt secondary to malignancy vs deconditioning given prolonged admission vs intermittent Afib vs v/q mismatch   :: RR 23-44   :: 5/22 ABG: pH 7.53, pCO2 31, pO2 109, Lactate 2.2, FiO2 40.  - On 4L nasal cannula previously requiring intermittent AIRVO to assist with work of breathing.   - gradually wean supplemental O2 for SpO2 goal >90%   - continue abx regimen  - CTM     #Coffee grounds via NGT (placed 5/21), improved   #Melena likely d/t GI bleed  :: KUB 5/21, showing moderate distention of stomach with nonspecific predominantly fluid-filled bowel pattern.  :: 5/20-5/21 patient emesis that is watery, " dark brown, malodorous   :: did initially present w/ diarrhea could have been chemo induced vs stool ball  :: INR 4.9>6.3>2.4 >1.2  :: s/p Vitamin K 10 mg  :: KUB w/ dilated bowel loops   - Soft diet, encouraged patient to go slow  - holding ferrous sulfate    - pantoprazole 40 bid   - GI consulted, appreciate recs  -s/p flex sig with no evidence of active bleed              -Will hold off EGD since coffee ground emesis resolved     #Illeus (improved/resolved)  :: KUB 5/26 kub shows distended bowel loops likely ileus, will monitor for improvement with PO diet.   - NG tube clamped; If becoming nauseous or developing vomiting, can unclamp NGT and place to LIWS.  - Tolerating PO and having BM. Will CTM     #Pneumonia, Right lower lobe ill-defined patchy infiltrate (resolved)  :: Resp Cult salivary contamination  :: MRSA nares: negative  :: Strep/Legionella antigens: negative  :: CXR 5/21 showing persistent patchy right infrahilar airspace opacities; improved on serial CXRs  :: 5/22 CT CAP w/o contrast: Interval improvement of the right lower lobe patchy infiltrate  :: s/p Azithro (5/16-5/18)  :: s/p vanc (5/16-5/18) (5/20-5/22) and zosyn (5/16-5/18) (5/20-22)  -eropenem (5/19-5/20) (5/22-25) and micafungin (5/22-25)  -Abx stopped 5/25 given prolonged course with improvement/decline independent of abx, do not suspect active infection currently.       #Fever  #Sepsis most likely d/t pneumonia  :: at OSH met SIRS criteria (febrile, tachypnea)  :: UA 5/18 with small LE, 3+ bacteria, urine cx 5/21 negative  :: Resp Cult salivary contamination  :: MRSA nares: negative  :: Strep/Legionella antigens: negative  :: c diff and enteric stool panel: negative  :: BC 5/16 NGTD, repeat BC 5/20 NGTD  :: CRP 30.46>42.36  :: CXR 5/21 showing persistent patchy right infrahilar airspace opacities    :: s/p Azithro (5/16-5/18)  :: asymmetric warmth, erythema, edema noted in left lower extremity   :: s/p vanc (5/16-5/18) (5/20-5/25) and  zosyn (5/16-5/18) (5/20-22)  :: 5/22 CT CAP w/o contrast: Interval improvement of the right lower lobe patchy infiltrate, Interval flattening of the inferior vena cava which may be seen with hypovolemia, Improved proximal sigmoid diverticulitis.  - repeat blood cultures drawn 5/22, NGTD   - meropenem (5/19-5/20) (5/22-5/25) and micafungin (5/22-5/25); restarted Vanc/Sofy briefly overnight 5/27.     #Lower leg skin changes likely d/t cellulitis   :: previously improved margins of left lower extremity skin changes  - Margins worsened on morning exam 5/30, tender to palpation, slightly warmer than adjacent leg.   - CTM  - Keflex 500 q8 5 day course for cellulitis (5/30-6/3)     #VIK on CKD, likely prerenal d/t fluid status   #CKD (baseline Cr 1.3-1.4)  #urine retention s/p Boyd Catheter Placement by Urology in OSH  #Oliguria (6/4- )  Estimated Creatinine Clearance: 18.2 mL/min (A) (by C-G formula based on SCr of 3.64 mg/dL (H)).  :: US renal 5/16: Nonobstructing 1 cm left renal calculus, no hydronephrosis  :: Cr 1.64>2.26>3.00 (Baseline Cr 1.3-1.4)  :: Urine electrolytes pre-renal, Net negative, hypernatremia with prolonged NPO all suggest pre-renal hypovolemic VIK. Will encourage PO intake  - will eventually need boyd removal and trial  - strict in/out  - tamsulosin 0.4 mg daily  - minimize nephrotoxic medications as able  - boyd removed 5/23/24 (5/16-5/23)  - Trialed Albumin 25 QID (2days) with worsening CrCl > consulted Nephrology.  - Repeat UA (turbid, 1+ protein, othwerwise unremarkable), U lytes (Mariella 26, Ucl 16, Cr 106.5), FeNa PreRenal, Pending renal US  - 75cc/hr NS per     #Pancytopenia, most likely medication induced from pemfexy, resolved  #Thrombocytopenia  :: c/f TTP  ::   :: s/p pemfexy 5/14  :: d dimer 1521, fibrinogen >1000- no c/f dic  :: no schistocytes on smear,   :: filgrastim 480 mcg (5/21-5/22)  :: 5/23 ANC 4130; haptoglobin 400   - CTM. Goal >10  - s/p several units of plts      #Elevated INR  :: INR 4.9>6.3>2.4 >1.2  :: s/p Vitamin K 10 mg  -CTM    #Hospital-acquired Delirium  :: waxing and waning altered mental status  -Delirium precautions  -hold home trazodone for insomnia      Antibiotics:: none  Dispo: SNF.      F: Replete PRN  E: Replete PRN  N: Regular Diet  DVT Ppx: None iso of low Plt  GI Ppx: Pantoprazole   Access/Lines: PIV x3  Forrest External (5/23- )  Abx: None  O2: 2L NC    Pain regimen: Tylenol   GI Laxative: None     Code status: DNR and No Intubation  Emergency contact:Kirstne Arriola (Spouse) [phone]        Ramesh Conley MD  PGY-1 Internal Medicine

## 2024-06-09 NOTE — SIGNIFICANT EVENT
06/09/24 1420   Onset Documentation   Rapid Response Initiated By Radar auto page   Location/Room [facility]  ([facility] 3778)   Pager Time [phone]   Arrival Time 1420   Event End Time 1430   Level II Called No   Primary Reason for Call Radar auto page     Rapid Response Note    Radar auto-page received for a radar score of 6 with the following vital signs: 36.1, 96, 93/61, 95%.  Vital signs were confirmed and reviewed with primary RN.  There have been no acute changes.  There are no indications for interventions by Rapid Response at this time.  RN to contact Rapid Response with any future concerns or signs of clinical decompensation.    ROS otherwise negative unless otherwise stated except per HPI

## 2024-06-10 LAB
ALBUMIN SERPL BCP-MCNC: 2.9 G/DL (ref 3.4–5)
ALBUMIN SERPL BCP-MCNC: 3 G/DL (ref 3.4–5)
ALP SERPL-CCNC: 54 U/L (ref 33–136)
ALT SERPL W P-5'-P-CCNC: 26 U/L (ref 10–52)
ANION GAP SERPL CALC-SCNC: 13 MMOL/L (ref 10–20)
ANION GAP SERPL CALC-SCNC: 15 MMOL/L (ref 10–20)
AST SERPL W P-5'-P-CCNC: 40 U/L (ref 9–39)
BASOPHILS # BLD AUTO: 0 X10*3/UL (ref 0–0.1)
BASOPHILS # BLD AUTO: 0 X10*3/UL (ref 0–0.1)
BASOPHILS NFR BLD AUTO: 0 %
BASOPHILS NFR BLD AUTO: 0 %
BILIRUB SERPL-MCNC: 0.3 MG/DL (ref 0–1.2)
BLOOD EXPIRATION DATE: NORMAL
BUN SERPL-MCNC: 56 MG/DL (ref 6–23)
BUN SERPL-MCNC: 56 MG/DL (ref 6–23)
CALCIUM SERPL-MCNC: 8.3 MG/DL (ref 8.6–10.6)
CALCIUM SERPL-MCNC: 8.3 MG/DL (ref 8.6–10.6)
CHLORIDE SERPL-SCNC: 103 MMOL/L (ref 98–107)
CHLORIDE SERPL-SCNC: 106 MMOL/L (ref 98–107)
CO2 SERPL-SCNC: 22 MMOL/L (ref 21–32)
CO2 SERPL-SCNC: 24 MMOL/L (ref 21–32)
CREAT SERPL-MCNC: 3.69 MG/DL (ref 0.5–1.3)
CREAT SERPL-MCNC: 3.76 MG/DL (ref 0.5–1.3)
DISPENSE STATUS: NORMAL
EGFRCR SERPLBLD CKD-EPI 2021: 15 ML/MIN/1.73M*2
EGFRCR SERPLBLD CKD-EPI 2021: 15 ML/MIN/1.73M*2
EOSINOPHIL # BLD AUTO: 0.31 X10*3/UL (ref 0–0.4)
EOSINOPHIL # BLD AUTO: 0.35 X10*3/UL (ref 0–0.4)
EOSINOPHIL NFR BLD AUTO: 7 %
EOSINOPHIL NFR BLD AUTO: 7.8 %
ERYTHROCYTE [DISTWIDTH] IN BLOOD BY AUTOMATED COUNT: 16 % (ref 11.5–14.5)
ERYTHROCYTE [DISTWIDTH] IN BLOOD BY AUTOMATED COUNT: 16.5 % (ref 11.5–14.5)
GLUCOSE SERPL-MCNC: 107 MG/DL (ref 74–99)
GLUCOSE SERPL-MCNC: 89 MG/DL (ref 74–99)
HCT VFR BLD AUTO: 22.9 % (ref 41–52)
HCT VFR BLD AUTO: 27.8 % (ref 41–52)
HGB BLD-MCNC: 6.9 G/DL (ref 13.5–17.5)
HGB BLD-MCNC: 8.7 G/DL (ref 13.5–17.5)
IMM GRANULOCYTES # BLD AUTO: 0.17 X10*3/UL (ref 0–0.5)
IMM GRANULOCYTES # BLD AUTO: 0.17 X10*3/UL (ref 0–0.5)
IMM GRANULOCYTES NFR BLD AUTO: 3.8 % (ref 0–0.9)
IMM GRANULOCYTES NFR BLD AUTO: 3.9 % (ref 0–0.9)
LYMPHOCYTES # BLD AUTO: 0.3 X10*3/UL (ref 0.8–3)
LYMPHOCYTES # BLD AUTO: 0.36 X10*3/UL (ref 0.8–3)
LYMPHOCYTES NFR BLD AUTO: 6.7 %
LYMPHOCYTES NFR BLD AUTO: 8.2 %
MAGNESIUM SERPL-MCNC: 2.04 MG/DL (ref 1.6–2.4)
MAGNESIUM SERPL-MCNC: 2.18 MG/DL (ref 1.6–2.4)
MCH RBC QN AUTO: 28.9 PG (ref 26–34)
MCH RBC QN AUTO: 29.4 PG (ref 26–34)
MCHC RBC AUTO-ENTMCNC: 30.1 G/DL (ref 32–36)
MCHC RBC AUTO-ENTMCNC: 31.3 G/DL (ref 32–36)
MCV RBC AUTO: 94 FL (ref 80–100)
MCV RBC AUTO: 96 FL (ref 80–100)
MONOCYTES # BLD AUTO: 0.3 X10*3/UL (ref 0.05–0.8)
MONOCYTES # BLD AUTO: 0.43 X10*3/UL (ref 0.05–0.8)
MONOCYTES NFR BLD AUTO: 6.7 %
MONOCYTES NFR BLD AUTO: 9.8 %
NEUTROPHILS # BLD AUTO: 3.13 X10*3/UL (ref 1.6–5.5)
NEUTROPHILS # BLD AUTO: 3.39 X10*3/UL (ref 1.6–5.5)
NEUTROPHILS NFR BLD AUTO: 71.1 %
NEUTROPHILS NFR BLD AUTO: 75 %
NRBC BLD-RTO: 0 /100 WBCS (ref 0–0)
NRBC BLD-RTO: 0 /100 WBCS (ref 0–0)
PHOSPHATE SERPL-MCNC: 4.4 MG/DL (ref 2.5–4.9)
PHOSPHATE SERPL-MCNC: 4.9 MG/DL (ref 2.5–4.9)
PLATELET # BLD AUTO: 123 X10*3/UL (ref 150–450)
PLATELET # BLD AUTO: 134 X10*3/UL (ref 150–450)
POTASSIUM SERPL-SCNC: 5.1 MMOL/L (ref 3.5–5.3)
POTASSIUM SERPL-SCNC: 5.2 MMOL/L (ref 3.5–5.3)
PRODUCT BLOOD TYPE: 6200
PRODUCT CODE: NORMAL
PROT SERPL-MCNC: 6.1 G/DL (ref 6.4–8.2)
RBC # BLD AUTO: 2.39 X10*6/UL (ref 4.5–5.9)
RBC # BLD AUTO: 2.96 X10*6/UL (ref 4.5–5.9)
SODIUM SERPL-SCNC: 136 MMOL/L (ref 136–145)
SODIUM SERPL-SCNC: 137 MMOL/L (ref 136–145)
UNIT ABO: NORMAL
UNIT NUMBER: NORMAL
UNIT RH: NORMAL
UNIT VOLUME: 350
WBC # BLD AUTO: 4.4 X10*3/UL (ref 4.4–11.3)
WBC # BLD AUTO: 4.5 X10*3/UL (ref 4.4–11.3)
XM INTEP: NORMAL

## 2024-06-10 PROCEDURE — 2500000005 HC RX 250 GENERAL PHARMACY W/O HCPCS: Performed by: STUDENT IN AN ORGANIZED HEALTH CARE EDUCATION/TRAINING PROGRAM

## 2024-06-10 PROCEDURE — 80053 COMPREHEN METABOLIC PANEL: CPT

## 2024-06-10 PROCEDURE — 99233 SBSQ HOSP IP/OBS HIGH 50: CPT

## 2024-06-10 PROCEDURE — 84100 ASSAY OF PHOSPHORUS: CPT | Mod: 91

## 2024-06-10 PROCEDURE — 2500000001 HC RX 250 WO HCPCS SELF ADMINISTERED DRUGS (ALT 637 FOR MEDICARE OP)

## 2024-06-10 PROCEDURE — 2500000002 HC RX 250 W HCPCS SELF ADMINISTERED DRUGS (ALT 637 FOR MEDICARE OP, ALT 636 FOR OP/ED): Mod: MUE

## 2024-06-10 PROCEDURE — 36415 COLL VENOUS BLD VENIPUNCTURE: CPT

## 2024-06-10 PROCEDURE — 83735 ASSAY OF MAGNESIUM: CPT | Mod: 91

## 2024-06-10 PROCEDURE — 83735 ASSAY OF MAGNESIUM: CPT | Performed by: STUDENT IN AN ORGANIZED HEALTH CARE EDUCATION/TRAINING PROGRAM

## 2024-06-10 PROCEDURE — 80069 RENAL FUNCTION PANEL: CPT | Mod: CCI | Performed by: STUDENT IN AN ORGANIZED HEALTH CARE EDUCATION/TRAINING PROGRAM

## 2024-06-10 PROCEDURE — 2500000002 HC RX 250 W HCPCS SELF ADMINISTERED DRUGS (ALT 637 FOR MEDICARE OP, ALT 636 FOR OP/ED)

## 2024-06-10 PROCEDURE — 85025 COMPLETE CBC W/AUTO DIFF WBC: CPT | Mod: 91

## 2024-06-10 PROCEDURE — 1170000001 HC PRIVATE ONCOLOGY ROOM DAILY

## 2024-06-10 PROCEDURE — 36430 TRANSFUSION BLD/BLD COMPNT: CPT

## 2024-06-10 PROCEDURE — 85025 COMPLETE CBC W/AUTO DIFF WBC: CPT

## 2024-06-10 PROCEDURE — 99232 SBSQ HOSP IP/OBS MODERATE 35: CPT

## 2024-06-10 PROCEDURE — P9040 RBC LEUKOREDUCED IRRADIATED: HCPCS

## 2024-06-10 RX ADMIN — FERROUS SULFATE TAB 325 MG (65 MG ELEMENTAL FE) 1 TABLET: 325 (65 FE) TAB at 08:04

## 2024-06-10 RX ADMIN — DEXTROMETHORPHAN 30 MG: 30 SUSPENSION, EXTENDED RELEASE ORAL at 08:04

## 2024-06-10 RX ADMIN — TAMSULOSIN HYDROCHLORIDE 0.4 MG: 0.4 CAPSULE ORAL at 08:04

## 2024-06-10 RX ADMIN — OXYCODONE HYDROCHLORIDE AND ACETAMINOPHEN 1000 MG: 500 TABLET ORAL at 08:04

## 2024-06-10 RX ADMIN — PANTOPRAZOLE SODIUM 40 MG: 40 TABLET, DELAYED RELEASE ORAL at 06:28

## 2024-06-10 RX ADMIN — Medication 6 MG: at 21:59

## 2024-06-10 RX ADMIN — NYSTATIN 1 APPLICATION: 100000 POWDER TOPICAL at 22:00

## 2024-06-10 RX ADMIN — BENZONATATE 100 MG: 100 CAPSULE ORAL at 03:07

## 2024-06-10 RX ADMIN — NYSTATIN 1 APPLICATION: 100000 POWDER TOPICAL at 08:08

## 2024-06-10 RX ADMIN — METOPROLOL SUCCINATE 150 MG: 50 TABLET, EXTENDED RELEASE ORAL at 08:04

## 2024-06-10 RX ADMIN — ASPIRIN 81 MG: 81 TABLET, COATED ORAL at 21:59

## 2024-06-10 RX ADMIN — Medication 3 L/MIN: at 08:00

## 2024-06-10 RX ADMIN — BENZONATATE 100 MG: 100 CAPSULE ORAL at 20:45

## 2024-06-10 RX ADMIN — PANTOPRAZOLE SODIUM 40 MG: 40 TABLET, DELAYED RELEASE ORAL at 16:11

## 2024-06-10 RX ADMIN — DEXTROMETHORPHAN 30 MG: 30 SUSPENSION, EXTENDED RELEASE ORAL at 21:59

## 2024-06-10 RX ADMIN — ROSUVASTATIN CALCIUM 10 MG: 10 TABLET, FILM COATED ORAL at 21:59

## 2024-06-10 RX ADMIN — Medication 3 L/MIN: at 20:47

## 2024-06-10 ASSESSMENT — COGNITIVE AND FUNCTIONAL STATUS - GENERAL
HELP NEEDED FOR BATHING: A LOT
PERSONAL GROOMING: A LITTLE
STANDING UP FROM CHAIR USING ARMS: TOTAL
DAILY ACTIVITIY SCORE: 14
WALKING IN HOSPITAL ROOM: TOTAL
TURNING FROM BACK TO SIDE WHILE IN FLAT BAD: A LOT
EATING MEALS: A LITTLE
DRESSING REGULAR UPPER BODY CLOTHING: A LOT
MOVING TO AND FROM BED TO CHAIR: TOTAL
MOVING FROM LYING ON BACK TO SITTING ON SIDE OF FLAT BED WITH BEDRAILS: A LOT
DRESSING REGULAR LOWER BODY CLOTHING: A LOT
CLIMB 3 TO 5 STEPS WITH RAILING: TOTAL
TOILETING: A LOT
MOBILITY SCORE: 8

## 2024-06-10 ASSESSMENT — PAIN SCALES - GENERAL
PAINLEVEL_OUTOF10: 0 - NO PAIN

## 2024-06-10 ASSESSMENT — PAIN - FUNCTIONAL ASSESSMENT
PAIN_FUNCTIONAL_ASSESSMENT: 0-10

## 2024-06-10 NOTE — SIGNIFICANT EVENT
Hematology fup note:    Vinicius Arriola is a 88 y.o. male PMH afib/flutter, hx DVT on warfarin, HTN, CAD s/p stent, mitral regurg, HLD, polycythemia vera managed by Dr. Rothman ([facility]), JOVANNI, basal cell ca on face, recurrent mesothelioma s/p VATS (5/2022) and radiation (9-10/2022) and s/p C1 permetrexed 5/14 with Dr Galavn admitted 5/16 for abdominal pain, fever, diarrhea, urinary retention and c/f sepsis presumed 2/2 PNA.      Prolonged hospitalization notable for PNA, c/b shock and acute hypoxic resp failure and GI bleed s/p flex sig 5/24 with blood in rectum but no active bleed. Over hospital course, noted to have worsening anemia. On admission Hgb 13, currently ~7, requiring PRN blood transfusions. GI re-evaluation and low concern for continued GI bleed and no indication to repeat endoscopic procedure. Pt currently off AC since 6/4.     6/10 4.4/6.9/123 EPO pending  6/6 WBC 3.7 ANC 2.79 Hgb 7.4 Plt 97 Cr 3.69 Tbili 0.5 retic 0.3%  6/3 Hapto 227  6/2 occult blood NEG, folate 11 B12 1.2k  iron 64 TIBC 175 Tsat 37% ferritin 6.6k     Anemia multifactorial from recent chemotherapy, low bone marrow reserve given age, recent GI bleed, inflammation due to malignancy, frequent blood draws. Anemia management is supportive transfusions. No indication for EMMA given increased risk of thrombosis during active malignancy and no plan for cancer directed therapy.      Recommendations:   -Avoid excessive blood draws  -Monitor for GIB  -Transfuse for Hgb <7     Discussed with Dr Norton. Hematology will sign off.      Yee Keene MD  Hematology Oncology fellow, PGY-5  Pager [phone]

## 2024-06-10 NOTE — PROGRESS NOTES
Vinicius HUMMEL [place]   88 y.o.      MRN/Room: [id number]    Subjective:   The patient is comfortable in bed without distress.      Meds:   Current Facility-Administered Medications   Medication Dose Route Frequency Provider Last Rate Last Admin    acetaminophen (Tylenol) oral liquid 650 mg  650 mg oral q4h PRN Ramesh Conley MD        Or    acetaminophen (Tylenol) tablet 650 mg  650 mg oral q4h PRN Ramesh Conley MD        albuterol 2.5 mg /3 mL (0.083 %) nebulizer solution 2.5 mg  2.5 mg nebulization q6h PRN Rhianna Holland MD        ascorbic acid (Vitamin C) tablet 1,000 mg  1,000 mg oral Daily Ramesh Conley MD   1,000 mg at 06/10/24 0804    aspirin EC tablet 81 mg  81 mg oral Nightly Ramesh Conley MD   81 mg at 06/09/24 2105    benzonatate (Tessalon) capsule 100 mg  100 mg oral TID PRN Ramesh Conley MD   100 mg at 06/10/24 0307    carboxymethylcellulose (Refresh Celluvisc) 1 % ophthalmic solution 1 drop  1 drop Both Eyes PRN Rhianna Holland MD        dextromethorphan (Delsym) 30 mg/5 mL liquid 30 mg  30 mg oral q12h [facility] Coretta Miller MD   30 mg at 06/10/24 0804    diclofenac sodium (Voltaren) 1 % gel 4 g  4 g Topical 4x daily PRN Rhianna Holland MD        ergocalciferol (Vitamin D-2) capsule 1,250 mcg  1,250 mcg oral Every Sunday Ramesh Conley MD   1,250 mcg at 06/09/24 0847    ferrous sulfate (325 mg ferrous sulfate) tablet 1 tablet  65 mg of iron oral Daily with breakfast Ramesh Conley MD   1 tablet at 06/10/24 0804    melatonin tablet 5 mg  5 mg oral Nightly PRN Rhianna Holland MD   5 mg at 06/06/24 2042    melatonin tablet 6 mg  6 mg oral Daily Louis Willard MD   6 mg at 06/09/24 2105    metoprolol succinate XL (Toprol-XL) 24 hr tablet 150 mg  150 mg oral Daily Ramesh Conley MD   150 mg at 06/10/24 0804    nitroglycerin (Nitrostat) SL tablet 0.4 mg  0.4 mg sublingual q5 min PRN Ramesh Conley MD        nystatin (Mycostatin) 100,000 unit/gram powder 1 Application   1 Application Topical BID Rhianna Holland MD   1 Application at 06/10/24 0808    ondansetron (Zofran) tablet 4 mg  4 mg oral q8h PRN Rhianna Holland MD        Or    ondansetron (Zofran) injection 4 mg  4 mg intravenous q8h PRN Rhianna Holland MD   4 mg at 06/08/24 1046    oxygen (O2) therapy   inhalation Continuous - Inhalation Rhianna Holland MD   3 L/min at 06/10/24 0800    oxygen (O2) therapy   inhalation Continuous PRN - O2/gases Rhianna Holland MD   4 L/min at 05/24/24 0000    pantoprazole (ProtoNix) EC tablet 40 mg  40 mg oral BID AC Ramesh Conley MD   40 mg at 06/10/24 1611    perflutren protein A microsphere (Optison) injection 0.5 mL  0.5 mL intravenous Once in imaging Rhianna Holland MD        rosuvastatin (Crestor) tablet 10 mg  10 mg oral Nightly Ramesh Conley MD   10 mg at 06/09/24 2105    sulfur hexafluoride microsphr (Lumason) injection 24.28 mg  2 mL intravenous Once in imaging Rhianna Holland MD        tamsulosin (Flomax) 24 hr capsule 0.4 mg  0.4 mg oral Daily Ramesh Conley MD   0.4 mg at 06/10/24 0804          ROS:  The patient is awake and oriented. No dizziness or lightheadedness. No chills and no fever. No headaches. No nausea and no vomiting. No shortness of breath. No cough. No sputum. No chest pain. No chest tightness. No abdominal pain. No diarrhea and no constipation. No hematemesis or hemoptysis. No hematuria. No rectal bleeding. No melena. No epistaxis. No urinary symptoms. No flank pain. No leg edema. No leg pain. No weakness. No itching. Overall, the rest of the review of systems is also negative.  12 point review of systems otherwise negative as stated in HPI.        Physical Examination:        Vitals:    06/10/24 1620   BP: 105/67   Pulse: 76   Resp: 18   Temp: 36.8 °C (98.2 °F)   SpO2: 98%     General: The patient is awake, oriented, and is not in any distress.  Head and Neck: Normocephalic. No periorbital edema.  Eyes: non-icteric  Respiratory: Symmetric air  entry. Symmetric chest expansion.No respiratory distress.  Cardiovascular: Symmetric peripheral pulses.  Skin: No maculopapular rash.  Abdomen: soft, nt/nd  Musculoskeletal: No peripheral edema in both left and right upper extremities.  No edema in either left or right lower extremities.  Neuro Exam: Speech is fluent. Moves extremities.    Imaging:  === 05/19/24 ===    US RENAL COMPLETE ([place])  This result has not been signed. Information might be incomplete.    - Impression -  Examination is somewhat limited due to patient body habitus,  positioning. Within this limitation: Increased renal cortical  echogenicity bilaterally which may relate to medical renal disease.  No hydronephrosis or nephrolithiasis.    I personally reviewed the images/study and I agree with the findings  as stated. This study was interpreted at [place], [place].    MACRO:  None      Dictation workstation:   [id number]       Blood Labs:  Results for orders placed or performed during the hospital encounter of 05/19/24 (from the past 24 hour(s))   Renal function panel   Result Value Ref Range    Glucose 126 (H) 74 - 99 mg/dL    Sodium 137 136 - 145 mmol/L    Potassium 5.3 3.5 - 5.3 mmol/L    Chloride 105 98 - 107 mmol/L    Bicarbonate 22 21 - 32 mmol/L    Anion Gap 15 10 - 20 mmol/L    Urea Nitrogen 54 (H) 6 - 23 mg/dL    Creatinine 3.81 (H) 0.50 - 1.30 mg/dL    eGFR 15 (L) >60 mL/min/1.73m*2    Calcium 8.5 (L) 8.6 - 10.6 mg/dL    Phosphorus 5.0 (H) 2.5 - 4.9 mg/dL    Albumin 3.1 (L) 3.4 - 5.0 g/dL   Magnesium   Result Value Ref Range    Magnesium 2.04 1.60 - 2.40 mg/dL   Phosphorus   Result Value Ref Range    Phosphorus 4.9 2.5 - 4.9 mg/dL   CBC and Auto Differential   Result Value Ref Range    WBC 4.4 4.4 - 11.3 x10*3/uL    nRBC 0.0 0.0 - 0.0 /100 WBCs    RBC 2.39 (L) 4.50 - 5.90 x10*6/uL    Hemoglobin 6.9 (L) 13.5 - 17.5 g/dL    Hematocrit 22.9 (L) 41.0 - 52.0 %    MCV 96 80 - 100 fL    MCH  28.9 26.0 - 34.0 pg    MCHC 30.1 (L) 32.0 - 36.0 g/dL    RDW 16.5 (H) 11.5 - 14.5 %    Platelets 123 (L) 150 - 450 x10*3/uL    Neutrophils % 71.1 40.0 - 80.0 %    Immature Granulocytes %, Automated 3.9 (H) 0.0 - 0.9 %    Lymphocytes % 8.2 13.0 - 44.0 %    Monocytes % 9.8 2.0 - 10.0 %    Eosinophils % 7.0 0.0 - 6.0 %    Basophils % 0.0 0.0 - 2.0 %    Neutrophils Absolute 3.13 1.60 - 5.50 x10*3/uL    Immature Granulocytes Absolute, Automated 0.17 0.00 - 0.50 x10*3/uL    Lymphocytes Absolute 0.36 (L) 0.80 - 3.00 x10*3/uL    Monocytes Absolute 0.43 0.05 - 0.80 x10*3/uL    Eosinophils Absolute 0.31 0.00 - 0.40 x10*3/uL    Basophils Absolute 0.00 0.00 - 0.10 x10*3/uL   Comprehensive metabolic panel   Result Value Ref Range    Glucose 89 74 - 99 mg/dL    Sodium 136 136 - 145 mmol/L    Potassium 5.2 3.5 - 5.3 mmol/L    Chloride 106 98 - 107 mmol/L    Bicarbonate 22 21 - 32 mmol/L    Anion Gap 13 10 - 20 mmol/L    Urea Nitrogen 56 (H) 6 - 23 mg/dL    Creatinine 3.69 (H) 0.50 - 1.30 mg/dL    eGFR 15 (L) >60 mL/min/1.73m*2    Calcium 8.3 (L) 8.6 - 10.6 mg/dL    Albumin 2.9 (L) 3.4 - 5.0 g/dL    Alkaline Phosphatase 54 33 - 136 U/L    Total Protein 6.1 (L) 6.4 - 8.2 g/dL    AST 40 (H) 9 - 39 U/L    Bilirubin, Total 0.3 0.0 - 1.2 mg/dL    ALT 26 10 - 52 U/L   Prepare RBC: 1 Units   Result Value Ref Range    PRODUCT CODE B3254R79     Unit Number [id number]     Unit ABO A     Unit RH POS     XM INTEP COMP     Dispense Status TR     Blood Expiration Date June 20, 2024 23:59 EDT     PRODUCT BLOOD TYPE 6200     UNIT VOLUME 350         Lab Results   Component Value Date    GLUCOSE 89 06/10/2024    CALCIUM 8.3 (L) 06/10/2024     06/10/2024    K 5.2 06/10/2024    CO2 22 06/10/2024     06/10/2024    BUN 56 (H) 06/10/2024    CREATININE 3.69 (H) 06/10/2024         Assessment and Plan:  Vinicius Arriola is a 88 y.o. male with PMH recurrent malignant mesothelioma (s/p C1 Pemetrexed 05/14), atrial fibrillation, Hx of DVT, HTN,  JOVANNI who is currently admitted under Oncology service. Nephrology consulted due to VIK.      #Acute kidney injury, non-oliguric   VIK likely hemodynamically mediated ATN in setting of shock, hypotension and possible contribution from nephrotoxicity of Pemetrexed. No major change in Cr level. Kidney US did not show any hydro.      RECOMMENDATIONS:  -This likely represents a case of ATN. Renal function remains stable. No indications for renal replacement therapy.   -Continue volume management as per primary team  -Nephrology will sign off. Please reach out via Epic Chat with questions      SW Dr Regina Valerio MD   Nephrology fellow   Nephrology pager [phone]  Available via Epic Chat

## 2024-06-10 NOTE — PROGRESS NOTES
"Vinicius Arriola is a 88 y.o. male on day 22 of admission presenting with 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Multi).    Subjective   NAEON. No acute concerns or complaints this AM. Denies melena, active bleeding.       Objective     Physical Exam  Constitutional:       General: He is not in acute distress.  HENT:      Mouth/Throat:      Pharynx: Oropharynx is clear.   Cardiovascular:      Rate and Rhythm: Normal rate and regular rhythm.      Pulses: Normal pulses.      Heart sounds: Normal heart sounds.   Pulmonary:      Effort: Pulmonary effort is normal.   Abdominal:      General: There is no distension.      Tenderness: There is no abdominal tenderness. There is no guarding.   Musculoskeletal:         General: Swelling (L>R, mild erythema improving per wife) present.   Skin:     General: Skin is warm and dry.      Findings: Erythema (R>L calf) present.         Last Recorded Vitals  Blood pressure 103/66, pulse 71, temperature 36.2 °C (97.2 °F), temperature source Temporal, resp. rate 18, height 1.727 m (5' 7.99\"), weight 118 kg (260 lb 5.8 oz), SpO2 95%.  Intake/Output last 3 Shifts:  I/O last 3 completed shifts:  In: 976.3 (8.3 mL/kg) [P.O.:300; I.V.:676.3 (5.7 mL/kg)]  Out: 935 (7.9 mL/kg) [Urine:935 (0.2 mL/kg/hr)]  Weight: 118.1 kg     Relevant Results  Results from last 72 hours   Lab Units 06/10/24  0721 06/09/24  0712 06/08/24  0511   WBC AUTO x10*3/uL 4.4 4.4 4.2*   HEMOGLOBIN g/dL 6.9* 7.3* 7.5*   HEMATOCRIT % 22.9* 24.3* 24.4*   PLATELETS AUTO x10*3/uL 123* 121* 113*        Results from last 72 hours   Lab Units 06/10/24  0721 06/09/24  1940 06/09/24  0712 06/08/24  0512   SODIUM mmol/L 136 137 139 139   POTASSIUM mmol/L 5.2 5.3 5.4* 4.7   CHLORIDE mmol/L 106 105 105 105   CO2 mmol/L 22 22 23 24   BUN mg/dL 56* 54* 58* 54*   CREATININE mg/dL 3.69* 3.81* 3.92* 3.64*   MAGNESIUM mg/dL 2.04  --  2.00 1.94   PHOSPHORUS mg/dL 4.9 5.0* 4.8 4.3      Scheduled medications  ascorbic acid, 1,000 mg, oral, Daily  aspirin, 81 mg, oral, " Nightly  dextromethorphan, 30 mg, oral, q12h ADELSO  ergocalciferol, 1,250 mcg, oral, Every Sunday  ferrous sulfate (325 mg ferrous sulfate), 65 mg of iron, oral, Daily with breakfast  melatonin, 6 mg, oral, Daily  metoprolol succinate XL, 150 mg, oral, Daily  nystatin, 1 Application, Topical, BID  oxygen, , inhalation, Continuous - Inhalation  pantoprazole, 40 mg, oral, BID AC  perflutren protein A microsphere, 0.5 mL, intravenous, Once in imaging  rosuvastatin, 10 mg, oral, Nightly  sulfur hexafluoride microsphr, 2 mL, intravenous, Once in imaging  tamsulosin, 0.4 mg, oral, Daily      Continuous medications     PRN medications  PRN medications: acetaminophen **OR** acetaminophen, albuterol, benzonatate, carboxymethylcellulose, diclofenac sodium, melatonin, nitroglycerin, ondansetron **OR** ondansetron, oxygen    Assessment/Plan   Vinicius Arriola is a 88 y.o. male with PMHx of PAD, afib/flutter, hx DVT on warfarin, HTN, CAD s/p stent, mitral regurg, HLD, polycythemia vera, managed by Dr. Rothman ([facility] JOVANNI, basal cell ca on face, s/p removal and radiation of malignant mesothelioma, S/p L VATS with decort 5/2022 c/b post op ileus, XRT 9-10/2022, now with recurrent mesothelioma s/p C1 permetrexed 5/14. Presented to [facility] 5/14 with abdominal pain, fever, diarrhea, urinary retention c/f sepsis presumed 2/2 PNA; CT CAP showed known LLL collapse 2/2 mesothelioma, new post L chest wall invasion, and c/f L pulmonary trunk tumor thrombus.  5/23 developed shock and acute hypoxic resp failure requiring MICU transfer for pressors and NIV (airvo), thought 2/2 GI bleeding with melena and coffee ground drainage from NGT. Quickly weaned off pressors, s/p flex sig with blood in rectum but no active bleed. Now transferred back to floor for further mgmt. Since transfer to floor, his course has been complicated by asymptomatic episodes of afib w/ rvr and flutter likely 2/2 to overall deconditioning, anemia, tenuous volume status, and  "resolving infection ultimately abated with oral Metoprolol. Continues to have down trending anemia, GI consulted without plan for scope. GOC with Dr Ann; no more treatment options. Family would like to pursue SNF at this time.     Updates 6/10:   - Cr improved after IVF yesterday, reassess Cr in PM  - Hb 6.9 today, will give one unit pRBCs    #VIK on CKD, likely prerenal d/t fluid status   #CKD (baseline Cr 1.3-1.4)  #urine retention s/p Boyd Catheter Placement by Urology in OSH  #Oliguria (6/4- )  Estimated Creatinine Clearance: 18.2 mL/min (A) (by C-G formula based on SCr of 3.64 mg/dL (H)).  :: US renal 5/16: Nonobstructing 1 cm left renal calculus, no hydronephrosis  :: Cr 1.64>2.26>3.00 (Baseline Cr 1.3-1.4)  :: Urine electrolytes pre-renal, Net negative, hypernatremia with prolonged NPO all suggest pre-renal hypovolemic VIK. Will encourage PO intake  - will eventually need boyd removal and trial  - strict in/out  - tamsulosin 0.4 mg daily  - minimize nephrotoxic medications as able  - boyd removed 5/23/24 (5/16-5/23)  - Trialed Albumin 25 QID (2days) with worsening CrCl > consulted Nephrology -> concern for ATN  - giving one unit blood 6/10, will follow up PM RFP and assess need for more fluids     #Worsening mesothelioma  #Tumor Thrombus  #Mesothelioma with Loculated Pleural Effusions   #Left Hilar Mass, Left Pulmonary Trunk Mass  #History JOVANNI, Pulmonary Embolism  :: primary oncologist Dr. Galvan, Dr Ann  :: s/p L VATS w/ decort 5/2022, XRT 9-10/22  :: s/p \"half-dose\" pemfexy on 5/14  :: CT chest 5/18 with significant collapse of left lung, loculated pleural effusions consistent with patient's known mesothelioma.  Also shows ill-defined patchy infiltrate in right lower lobe.  Shows left hilar mass with extension into left pulmonary trunk likely tumor thrombus   - Holding warfarin, continue to monitor   - Dr. Henderson recommends transitioning to Eliquis on discharge, deferring anticoagulation iso " thrombocytopenia. Plt stable above 50, resume AC (Heparin ggt; low intensity); plan to transition to Eliquis 2.5mg BID (if tolerating heparin)  - Continuing protocol w/ 1 mg folic acid daily  - Hospice Consult for informational meeting 5/31. Not currently interested in pursuing hospice.    #History Afib, HLD, CAD, PAD  #History DVT/PE on Warfarin  :: 5/21 TTE showing:  Left ventricular systolic function is hyperdynamic with a 70-75% estimated ejection fraction, Mildly elevated RVSP, Moderately dilated aortic root   Continues to have episodes of Afib w/ RVR occasional Aflut likely multifactorial  - Metop tartrate at 50mg TID > Metop Succinate 150 mg qd  - Holding warfarin, aspirin  - Continue home statin  - D/c Heparin ggt     #Anemia  :: Likely 2/2 GIB noted this admission  :: Melena and coffee grounds via NGT noted 5/22  :: Hgb initially 10, decreased to 6.9 on 5/23 (1u pRBC) > slow decrease again to 6.8 on 6/2 (1u pRBC)  :: Fe/TIBC (64/175 & 37%), B12 (1287), B9 (11), Hapto 227 // Retic 6/5 0.3%  - Continue to trend Hgb, platelets goal > 20. Platelet goal liberalized as no active bleeding, if hgb decreasing or bleeding clinically then transfuse to goal 50  - Anemia workup revels Mixed picture KAY but predominately anemia of chronic disease; with markedly reduced retic count.      - Will restart home Iron supplementation ( mg daily)  - GI consulted, no plan for acute intervention.   - 1 unit pRBCs 6/10 for Hb 6.9, will follow up post transfusion CBC     #Tachypnea   #Elevated A-a gradient   #AHRF (Improved)  :: multifactorial, like d/t shunt secondary to malignancy vs deconditioning given prolonged admission vs intermittent Afib vs v/q mismatch   :: RR 23-44   :: 5/22 ABG: pH 7.53, pCO2 31, pO2 109, Lactate 2.2, FiO2 40.  - On 4L nasal cannula previously requiring intermittent AIRVO to assist with work of breathing.   - gradually wean supplemental O2 for SpO2 goal >90%   - continue abx regimen  - CT       #Coffee grounds via NGT (placed 5/21), improved   #Melena likely d/t GI bleed  :: KUB 5/21, showing moderate distention of stomach with nonspecific predominantly fluid-filled bowel pattern.  :: 5/20-5/21 patient emesis that is watery, dark brown, malodorous   :: did initially present w/ diarrhea could have been chemo induced vs stool ball  :: INR 4.9>6.3>2.4 >1.2  :: s/p Vitamin K 10 mg  :: KUB w/ dilated bowel loops   - Soft diet, encouraged patient to go slow  - holding ferrous sulfate    - pantoprazole 40 bid   - GI consulted, appreciate recs  -s/p flex sig with no evidence of active bleed              -Will hold off EGD since coffee ground emesis resolved     #Illeus (improved/resolved)  :: KUB 5/26 kub shows distended bowel loops likely ileus, will monitor for improvement with PO diet.   - NG tube clamped; If becoming nauseous or developing vomiting, can unclamp NGT and place to LIWS.  - Tolerating PO and having BM. Will CTM     #Pneumonia, Right lower lobe ill-defined patchy infiltrate (resolved)  :: Resp Cult salivary contamination  :: MRSA nares: negative  :: Strep/Legionella antigens: negative  :: CXR 5/21 showing persistent patchy right infrahilar airspace opacities; improved on serial CXRs  :: 5/22 CT CAP w/o contrast: Interval improvement of the right lower lobe patchy infiltrate  :: s/p Azithro (5/16-5/18)  :: s/p vanc (5/16-5/18) (5/20-5/22) and zosyn (5/16-5/18) (5/20-22)  -eropenem (5/19-5/20) (5/22-25) and micafungin (5/22-25)  -Abx stopped 5/25 given prolonged course with improvement/decline independent of abx, do not suspect active infection currently.       #Fever, resolved  #Sepsis most likely d/t pneumonia  :: at OSH met SIRS criteria (febrile, tachypnea)  :: UA 5/18 with small LE, 3+ bacteria, urine cx 5/21 negative  :: Resp Cult salivary contamination  :: MRSA nares: negative  :: Strep/Legionella antigens: negative  :: c diff and enteric stool panel: negative  :: BC 5/16 NGTD, repeat BC  5/20 NGTD  :: CRP 30.46>42.36  :: CXR 5/21 showing persistent patchy right infrahilar airspace opacities    :: s/p Azithro (5/16-5/18)  :: asymmetric warmth, erythema, edema noted in left lower extremity   :: s/p vanc (5/16-5/18) (5/20-5/25) and zosyn (5/16-5/18) (5/20-22)  :: 5/22 CT CAP w/o contrast: Interval improvement of the right lower lobe patchy infiltrate, Interval flattening of the inferior vena cava which may be seen with hypovolemia, Improved proximal sigmoid diverticulitis.  - repeat blood cultures drawn 5/22, NGTD   - meropenem (5/19-5/20) (5/22-5/25) and micafungin (5/22-5/25); restarted Vanc/Sofy briefly overnight 5/27.     #Lower leg skin changes likely d/t cellulitis, improving  :: previously improved margins of left lower extremity skin changes  - Margins worsened on morning exam 5/30, tender to palpation, slightly warmer than adjacent leg.   - CTM  - Keflex 500 q8 5 day course for cellulitis (5/30-6/3)     #Pancytopenia, most likely medication induced from pemfexy, resolved  #Thrombocytopenia  :: c/f TTP  ::   :: s/p pemfexy 5/14  :: d dimer 1521, fibrinogen >1000- no c/f dic  :: no schistocytes on smear,   :: filgrastim 480 mcg (5/21-5/22)  :: 5/23 ANC 4130; haptoglobin 400   - CTM. Goal >10  - s/p several units of plts     #Elevated INR  :: INR 4.9>6.3>2.4 >1.2  :: s/p Vitamin K 10 mg  -CTM     #Hospital-acquired Delirium  :: waxing and waning altered mental status  -Delirium precautions  -hold home trazodone for insomnia     F: Replete PRN  E: Replete PRN  N: Regular Diet  A: PIV    GI: Pantoprazole  DVT PPX: None (low plt, c/f bleed)     CODE STATUS: DNR and No Intubation  NOK: [place],Kirsten (Spouse) [phone]    Abby Riley MD  PGY-1             Abby Riley MD

## 2024-06-10 NOTE — CARE PLAN
The patient's goals for the shift include      The clinical goals for the shift include Patient will remain HDS    Over the shift, the patient made progress toward the following goals. Barriers to progression include na. Recommendations to address these barriers include na.    Problem: Skin  Goal: Promote/optimize nutrition  Outcome: Progressing  Flowsheets (Taken 6/10/2024 0745)  Promote/optimize nutrition:   Offer water/supplements/favorite foods   Monitor/record intake including meals  Goal: Decreased wound size/increased tissue granulation at next dressing change  Outcome: Progressing  Flowsheets (Taken 6/10/2024 0745)  Decreased wound size/increased tissue granulation at next dressing change:   Promote sleep for wound healing   Utilize specialty bed per algorithm   Protective dressings over bony prominences  Goal: Participates in plan/prevention/treatment measures  Outcome: Progressing  Flowsheets (Taken 6/10/2024 0745)  Participates in plan/prevention/treatment measures: Elevate heels

## 2024-06-11 VITALS
WEIGHT: 260.36 LBS | OXYGEN SATURATION: 93 % | SYSTOLIC BLOOD PRESSURE: 103 MMHG | BODY MASS INDEX: 39.46 KG/M2 | DIASTOLIC BLOOD PRESSURE: 68 MMHG | TEMPERATURE: 97.2 F | RESPIRATION RATE: 20 BRPM | HEIGHT: 68 IN | HEART RATE: 100 BPM

## 2024-06-11 LAB
ABO GROUP (TYPE) IN BLOOD: NORMAL
ALBUMIN SERPL BCP-MCNC: 2.9 G/DL (ref 3.4–5)
ALP SERPL-CCNC: 57 U/L (ref 33–136)
ALT SERPL W P-5'-P-CCNC: 22 U/L (ref 10–52)
ANION GAP SERPL CALC-SCNC: 16 MMOL/L (ref 10–20)
ANTIBODY SCREEN: NORMAL
AST SERPL W P-5'-P-CCNC: 34 U/L (ref 9–39)
BASOPHILS # BLD AUTO: 0.01 X10*3/UL (ref 0–0.1)
BASOPHILS NFR BLD AUTO: 0.3 %
BILIRUB SERPL-MCNC: 0.4 MG/DL (ref 0–1.2)
BUN SERPL-MCNC: 59 MG/DL (ref 6–23)
CALCIUM SERPL-MCNC: 8.2 MG/DL (ref 8.6–10.6)
CHLORIDE SERPL-SCNC: 105 MMOL/L (ref 98–107)
CO2 SERPL-SCNC: 20 MMOL/L (ref 21–32)
CREAT SERPL-MCNC: 3.58 MG/DL (ref 0.5–1.3)
EGFRCR SERPLBLD CKD-EPI 2021: 16 ML/MIN/1.73M*2
EOSINOPHIL # BLD AUTO: 0.31 X10*3/UL (ref 0–0.4)
EOSINOPHIL NFR BLD AUTO: 7.9 %
EPO SERPL-ACNC: 47 MU/ML (ref 4–27)
ERYTHROCYTE [DISTWIDTH] IN BLOOD BY AUTOMATED COUNT: 16.3 % (ref 11.5–14.5)
GLUCOSE SERPL-MCNC: 86 MG/DL (ref 74–99)
HCT VFR BLD AUTO: 27.2 % (ref 41–52)
HGB BLD-MCNC: 8.5 G/DL (ref 13.5–17.5)
IMM GRANULOCYTES # BLD AUTO: 0.18 X10*3/UL (ref 0–0.5)
IMM GRANULOCYTES NFR BLD AUTO: 4.6 % (ref 0–0.9)
LYMPHOCYTES # BLD AUTO: 0.31 X10*3/UL (ref 0.8–3)
LYMPHOCYTES NFR BLD AUTO: 7.9 %
MAGNESIUM SERPL-MCNC: 2.02 MG/DL (ref 1.6–2.4)
MCH RBC QN AUTO: 29.5 PG (ref 26–34)
MCHC RBC AUTO-ENTMCNC: 31.3 G/DL (ref 32–36)
MCV RBC AUTO: 94 FL (ref 80–100)
MONOCYTES # BLD AUTO: 0.21 X10*3/UL (ref 0.05–0.8)
MONOCYTES NFR BLD AUTO: 5.3 %
NEUTROPHILS # BLD AUTO: 2.92 X10*3/UL (ref 1.6–5.5)
NEUTROPHILS NFR BLD AUTO: 74 %
NRBC BLD-RTO: 0 /100 WBCS (ref 0–0)
PHOSPHATE SERPL-MCNC: 4.5 MG/DL (ref 2.5–4.9)
PLATELET # BLD AUTO: 132 X10*3/UL (ref 150–450)
POTASSIUM SERPL-SCNC: 5 MMOL/L (ref 3.5–5.3)
PROT SERPL-MCNC: 5.7 G/DL (ref 6.4–8.2)
RBC # BLD AUTO: 2.88 X10*6/UL (ref 4.5–5.9)
RH FACTOR (ANTIGEN D): NORMAL
SODIUM SERPL-SCNC: 136 MMOL/L (ref 136–145)
WBC # BLD AUTO: 3.9 X10*3/UL (ref 4.4–11.3)

## 2024-06-11 PROCEDURE — 99239 HOSP IP/OBS DSCHRG MGMT >30: CPT

## 2024-06-11 PROCEDURE — 2500000001 HC RX 250 WO HCPCS SELF ADMINISTERED DRUGS (ALT 637 FOR MEDICARE OP): Performed by: STUDENT IN AN ORGANIZED HEALTH CARE EDUCATION/TRAINING PROGRAM

## 2024-06-11 PROCEDURE — 86850 RBC ANTIBODY SCREEN: CPT

## 2024-06-11 PROCEDURE — 85025 COMPLETE CBC W/AUTO DIFF WBC: CPT

## 2024-06-11 PROCEDURE — 83735 ASSAY OF MAGNESIUM: CPT | Performed by: STUDENT IN AN ORGANIZED HEALTH CARE EDUCATION/TRAINING PROGRAM

## 2024-06-11 PROCEDURE — 84075 ASSAY ALKALINE PHOSPHATASE: CPT

## 2024-06-11 PROCEDURE — 2500000001 HC RX 250 WO HCPCS SELF ADMINISTERED DRUGS (ALT 637 FOR MEDICARE OP)

## 2024-06-11 PROCEDURE — 2500000004 HC RX 250 GENERAL PHARMACY W/ HCPCS (ALT 636 FOR OP/ED): Mod: JZ

## 2024-06-11 PROCEDURE — 2500000002 HC RX 250 W HCPCS SELF ADMINISTERED DRUGS (ALT 637 FOR MEDICARE OP, ALT 636 FOR OP/ED): Mod: MUE

## 2024-06-11 PROCEDURE — 36415 COLL VENOUS BLD VENIPUNCTURE: CPT

## 2024-06-11 PROCEDURE — 84100 ASSAY OF PHOSPHORUS: CPT | Performed by: STUDENT IN AN ORGANIZED HEALTH CARE EDUCATION/TRAINING PROGRAM

## 2024-06-11 PROCEDURE — 2500000005 HC RX 250 GENERAL PHARMACY W/O HCPCS: Performed by: STUDENT IN AN ORGANIZED HEALTH CARE EDUCATION/TRAINING PROGRAM

## 2024-06-11 RX ORDER — METOPROLOL SUCCINATE 50 MG/1
150 TABLET, EXTENDED RELEASE ORAL DAILY
Qty: 90 TABLET | Refills: 0 | Status: ON HOLD | OUTPATIENT
Start: 2024-06-12 | End: 2024-07-12

## 2024-06-11 RX ORDER — TAMSULOSIN HYDROCHLORIDE 0.4 MG/1
0.4 CAPSULE ORAL DAILY
Qty: 30 CAPSULE | Refills: 0 | Status: ON HOLD | OUTPATIENT
Start: 2024-06-12 | End: 2024-07-12

## 2024-06-11 RX ORDER — ALBUTEROL SULFATE 0.83 MG/ML
2.5 SOLUTION RESPIRATORY (INHALATION) EVERY 6 HOURS PRN
Qty: 75 ML | Refills: 11 | Status: ON HOLD | OUTPATIENT
Start: 2024-06-11

## 2024-06-11 RX ORDER — ONDANSETRON 4 MG/1
4 TABLET, FILM COATED ORAL EVERY 8 HOURS PRN
Qty: 20 TABLET | Refills: 0 | Status: ON HOLD | OUTPATIENT
Start: 2024-06-11

## 2024-06-11 RX ORDER — CEFAZOLIN SODIUM 1 G/50ML
1 SOLUTION INTRAVENOUS EVERY 12 HOURS
Status: DISCONTINUED | OUTPATIENT
Start: 2024-06-11 | End: 2024-06-11

## 2024-06-11 RX ORDER — DICLOFENAC SODIUM 10 MG/G
4 GEL TOPICAL 4 TIMES DAILY PRN
Qty: 8 G | Refills: 1 | Status: ON HOLD | OUTPATIENT
Start: 2024-06-11 | End: 2024-07-11

## 2024-06-11 RX ORDER — PANTOPRAZOLE SODIUM 40 MG/1
40 TABLET, DELAYED RELEASE ORAL
Qty: 60 TABLET | Refills: 0 | Status: ON HOLD | OUTPATIENT
Start: 2024-06-11 | End: 2024-07-11

## 2024-06-11 RX ORDER — BENZONATATE 100 MG/1
100 CAPSULE ORAL 3 TIMES DAILY PRN
Qty: 20 CAPSULE | Refills: 0 | Status: ON HOLD | OUTPATIENT
Start: 2024-06-11

## 2024-06-11 RX ADMIN — BENZONATATE 100 MG: 100 CAPSULE ORAL at 05:20

## 2024-06-11 RX ADMIN — Medication 2 L/MIN: at 08:00

## 2024-06-11 RX ADMIN — TAMSULOSIN HYDROCHLORIDE 0.4 MG: 0.4 CAPSULE ORAL at 09:01

## 2024-06-11 RX ADMIN — CEFAZOLIN SODIUM 1 G: 1 INJECTION, SOLUTION INTRAVENOUS at 03:15

## 2024-06-11 RX ADMIN — OXYCODONE HYDROCHLORIDE AND ACETAMINOPHEN 1000 MG: 500 TABLET ORAL at 09:01

## 2024-06-11 RX ADMIN — NYSTATIN 1 APPLICATION: 100000 POWDER TOPICAL at 09:01

## 2024-06-11 RX ADMIN — METOPROLOL SUCCINATE 150 MG: 50 TABLET, EXTENDED RELEASE ORAL at 09:01

## 2024-06-11 RX ADMIN — FERROUS SULFATE TAB 325 MG (65 MG ELEMENTAL FE) 1 TABLET: 325 (65 FE) TAB at 09:01

## 2024-06-11 RX ADMIN — DEXTROMETHORPHAN 30 MG: 30 SUSPENSION, EXTENDED RELEASE ORAL at 09:01

## 2024-06-11 RX ADMIN — PANTOPRAZOLE SODIUM 40 MG: 40 TABLET, DELAYED RELEASE ORAL at 09:02

## 2024-06-11 ASSESSMENT — PAIN SCALES - GENERAL: PAINLEVEL_OUTOF10: 0 - NO PAIN

## 2024-06-11 NOTE — CARE PLAN
Problem: Skin  Goal: Promote/optimize nutrition  Outcome: Progressing  Flowsheets (Taken 6/10/2024 2337)  Promote/optimize nutrition:   Assist with feeding   Monitor/record intake including meals  Goal: Decreased wound size/increased tissue granulation at next dressing change  Outcome: Progressing  Flowsheets (Taken 6/10/2024 2337)  Decreased wound size/increased tissue granulation at next dressing change:   Promote sleep for wound healing   Protective dressings over bony prominences   Utilize specialty bed per algorithm  Goal: Participates in plan/prevention/treatment measures  Outcome: Progressing  Flowsheets (Taken 6/10/2024 2337)  Participates in plan/prevention/treatment measures:   Discuss with provider PT/OT consult   Elevate heels     Problem: Daily Care  Goal: Daily care needs are met  Outcome: Progressing     Problem: Psychosocial Needs  Goal: Demonstrates ability to cope with hospitalization/illness  Outcome: Progressing  Goal: Collaborate with me, my family, and caregiver to identify my specific goals  Outcome: Progressing

## 2024-06-11 NOTE — SIGNIFICANT EVENT
"Night Float Note    CC: Rash    HPI: Received the call that the patient had a rash. Per his nurse who has taken care of him in the past, he had a rash mostly on the leg that was not that large. Today, the rash is much larger. He endorses some tenderness to palpation. He denies any skin breakdown or lesions that he is aware of.     Focused physical exam:  Visit Vitals  BP 98/60 (BP Location: Right arm, Patient Position: Lying)   Pulse 78   Temp 36.6 °C (97.9 °F) (Temporal)   Resp 18   Ht 1.727 m (5' 7.99\")   Wt 118 kg (260 lb 5.8 oz)   SpO2 97%   BMI 39.60 kg/m²   Smoking Status Former   BSA 2.38 m²      SKIN: Erythema most prominent in the left thigh near the groin and across the lower abdomen towards the back. Notably warm. Blanchable. Tender to palpation. Pitting edema noted at the location of the rash. No satellite lesions noted. No fluctuance, pus, or abscess noted on the abdomen and upper thighs. Patient with skin discoloration in the bilateral lower extremities.     Pertinent Labs:  Lab Results   Component Value Date    WBC 4.5 06/10/2024    HGB 8.7 (L) 06/10/2024    HCT 27.8 (L) 06/10/2024    MCV 94 06/10/2024     (L) 06/10/2024      A/P: Although no fever or leukocytosis, given the worsening of the rash and warmth, there is a concern for a possible infectious cause. There are no satellite lesions or beefy redness, therefore this is less likely Candida. Will begin treatment for cellulitis for MSSA/strep given no fluctuance or abscesses noted.      #Cellulitis  - Start cefazolin 1 g q12h (renally dose)    Day Team Follow-ups:  [ ] Duration and discontinuation of antibiotics  [ ] Resolution of rash  "

## 2024-06-11 NOTE — DISCHARGE INSTRUCTIONS
Rowenaradha Mr Arriola. You were admitted to [facility] with concerns for infection. You were originally admitted to the critical care unit where you were treated with antibiotics. There was concern for a bleed in your GI tract. Our GI team was consulted and performed a flexible sigmoidoscopy which did not show a source of bleeding. Your bleeding eventually resolved and you did not require any more procedures from our GI team. Your hospital course was complicated by a kidney injury which is most likely in the setting of bleeding/low volume status. This improved over time. We also noticed you were having some heart arrhythmias which responded to medication. Now that you are medically optimized you are ready to go to a skilled facility for rehabilitation with a care navigator.    Best Wishes,  Your [facility] Oncology Team    Please follow up with your Oncologist. We have requested a Cardiology follow up.

## 2024-06-11 NOTE — DISCHARGE SUMMARY
Discharge Diagnosis  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Multi)    Issues Requiring Follow-Up  -Oncology: had multiple conversations regarding prognosis, patient and wife would like to pursue SNF and to assess strength down the line. Sending with hospice navigator.  -Cardiology: afib with RVR and aflutter. On metoprolol succinate 150mg daily; follow up requested    Test Results Pending At Discharge  Pending Labs       Order Current Status    Erythropoietin In process    Type and screen In process            Hospital Course  Vinicius Arriola 87 y/o M w/ PMHx of PAD, afib/aflutter, hx DVT on warfarin, HTN, CAD s/p stent, mitral regurg, HLD, polycythemia vera (managed by Dr. Rothman), JOVANNI, malignant mesothelioma s/p L VATS w/ decort 5/2022 c/b post-op ileus, XRT 9-10/2022 who initially presented to  [facility] w/ abdominal pain, fever and diarrhea after premetrexed (Cycle 1) on 5/14. He had urinary retention and boyd was placed. However, pt became hypotensive which was minimally responsive for IVF causing him to go to the ICU. Bedside ultrasound showed collapsible IVC, and he was given 1.5 L LR bolus. CT CAP w/ contrast was ordered which showed known L lung collapse d/t mesothelioma, but new L posterior chest wall invasion in 9-11th rib spaces, RLL infiltration w/ pleural effusion and atelectasis, and new concern for L pulmonary trunk tumor thrombus. There was also mild diverticulitis. The patient was initially treated with vanc/zosyn/azithromycin (5/16-5/18), and switched to meropenem (5/19-5/20). Sepsis work-up at OSH had MRSa nares negative, strep/legionella antigen negative, blood cx negative, urine cx negative. He was started on maintenance fluids and admitted to  on regular nursing floor.      On arrival to , meropenem switched to vanc/zosyn (5/20-5/22). Enteric stool panel negative, C diff negative. He was also initially diuresed with IV lasix 40 x2 d/t concern for volume overload contributing to hypoxia given b/l pitting edema in  lower extremities. Pt had abdominal distension and 2 episodes of emesis with dark brown foul smelling emesis. KUB showed moderate distension of the stomach w/ nonspecific fluid-filled bowel pattern. NGT was placed and pt was made NPO and all oral meds were held. He became pancytopenic, which was attributed to his chemotherapy and was given neupogen x2 until ANC >1000. Pt continued to be febrile, and he was switched to meropenem (5/22-5/25) and micafungin (5/22-5/25) CT CAP w/o contrast was ordered to help assess infection source and noted interval improvement in RLL infiltrate and diverticulitis.      His INR was supratherapeutic, and warfarin was held (last dose 5/19). Given new tumor thrombus, anticoagulation was discussed with outpatient cardiologist, and decision was made to convert to eliquis when appropriate. Despite holding the warfarin, his INR continued to increase. His INR peaked at 6.3 and was given 10 mg of Vitamin K w/ improvement in his INR. There was noted melena, hematuria, and coffee ground drainage from the NGT concerning for bleeding. DIC labs were sent and were negative. GI was consulted d/t concern for GI bleed and he was started on PPI. His Hgb dropped ~3 points and was transfued 1 unit pRBC. As INR decreased, his hematuria and coffee ground NG output resolved, but still had persistent melena.      On 5/23, rapid response called for low BP and lethargy. Pt arousable only to sternal rub. 1 L LR was given w/ minimal improvement in mental status and pressure. He was started on peripheral levo and transferred to the MICU for further management.     On 5/23, he was weaned off levo. GI performed flex sig which only showed bleeding in the rectum. EGD was not performed as pt was too unstable. He was weaned from his airvo to NC. He was transferred to the regular nursing floor on 5/24.     Hospital course has been since complicated by asymptomatic episodes of afib w/ rvr and flutter likely 2/2 to overall  deconditioning, anemia, tenuous volume status, and resolving infection ultimately abated with oral Metoprolol. Continues to have down trending anemia without evidence of bleed, GI consulted without plan for scope. Workup remarkable for ACD. Hematology consulted regarding downtrending Hgb and note that the patient's age and concurrent critical illness have likely resulted in poor BM reserve, supported by his low retic count. The patient has gradually worsening renal function with repeated workups c/f pre-renal VIK. Nephrology was consulted and recommended slow fluid repletion and time with greatest concern for ATN. No current plan for dialysis. Dr. Ann (Primary  Oncologist) came in to see the patient and informed them that there are no further therapies that can safely be offered, particularly considering how he did not tolerate the previous therapy well. The family has met with hospice but are not currently intersected and want discharge to SNF with Hospice Navigator. Patient was medically optimized.      Pertinent Physical Exam At Time of Discharge  Physical Exam  Constitutional:       General: He is not in acute distress.  HENT:      Head: Normocephalic and atraumatic.      Mouth/Throat:      Mouth: Mucous membranes are moist.      Pharynx: Oropharynx is clear.   Eyes:      Extraocular Movements: Extraocular movements intact.      Conjunctiva/sclera: Conjunctivae normal.      Pupils: Pupils are equal, round, and reactive to light.   Cardiovascular:      Rate and Rhythm: Normal rate and regular rhythm.      Pulses: Normal pulses.      Heart sounds: Normal heart sounds.   Pulmonary:      Effort: Pulmonary effort is normal.      Comments: Intermittent nonproductive cough  Abdominal:      General: Bowel sounds are normal. There is no distension.      Tenderness: There is no abdominal tenderness. There is no guarding.   Musculoskeletal:         General: Normal range of motion.      Right lower leg: Edema present.       Left lower leg: Edema present.   Skin:     Findings: Erythema (L leg and LLQ abdomen) present.   Neurological:      General: No focal deficit present.      Mental Status: He is alert and oriented to person, place, and time.         Home Medications     Medication List      START taking these medications     albuterol 2.5 mg /3 mL (0.083 %) nebulizer solution; Take 3 mL (2.5 mg)   by nebulization every 6 hours if needed for wheezing.   benzonatate 100 mg capsule; Commonly known as: Tessalon; Take 1 capsule   (100 mg) by mouth 3 times a day as needed for cough. Do not crush or chew.   diclofenac sodium 1 % gel; Commonly known as: Voltaren; Apply 4.5 inches   (4 g) topically 4 times a day as needed (pain).   metoprolol succinate XL 50 mg 24 hr tablet; Commonly known as:   Toprol-XL; Take 3 tablets (150 mg) by mouth once daily. Do not crush or   chew.; Start taking on: June 12, 2024   ondansetron 4 mg tablet; Commonly known as: Zofran; Take 1 tablet (4 mg)   by mouth every 8 hours if needed for nausea.   oxygen gas therapy; Commonly known as: O2; Inhale 1 each every 12 hours.   pantoprazole 40 mg EC tablet; Commonly known as: ProtoNix; Take 1 tablet   (40 mg) by mouth 2 times a day before meals. Do not crush, chew, or split.   tamsulosin 0.4 mg 24 hr capsule; Commonly known as: Flomax; Take 1   capsule (0.4 mg) by mouth once daily.; Start taking on: June 12, 2024     CHANGE how you take these medications     ferrous sulfate 325 (65 Fe) MG EC tablet; What changed: Another   medication with the same name was removed. Continue taking this   medication, and follow the directions you see here.     CONTINUE taking these medications     ascorbic acid 1,000 mg tablet; Commonly known as: Vitamin C   carboxymethylcellulose 1 % ophthalmic solution dropperette; Commonly   known as: Refresh Celluvisc   melatonin 5 mg tablet   nitroglycerin 0.4 mg SL tablet; Commonly known as: Nitrostat   omega-3 fatty acids-fish oil 360-1,200 mg  capsule   promethazine-DM 6.25-15 mg/5 mL syrup; Commonly known as: Phenergan-DM   rosuvastatin 10 mg tablet; Commonly known as: Crestor   traZODone 50 mg tablet; Commonly known as: Desyrel   ZINC ORAL     STOP taking these medications     aspirin 81 mg EC tablet   furosemide 20 mg tablet; Commonly known as: Lasix   metoprolol tartrate 25 mg tablet; Commonly known as: Lopressor   potassium chloride CR 10 mEq ER tablet; Commonly known as: Klor-Con   warfarin 1 mg tablet; Commonly known as: Coumadin   warfarin 2 mg tablet; Commonly known as: Coumadin   warfarin 4 mg tablet; Commonly known as: Coumadin       Outpatient Follow-Up  Future Appointments   Date Time Provider Department Center   8/16/2024  1:00 PM Tina Henderson MD [id number] [place]       Abby Riley MD

## 2024-06-11 NOTE — PROGRESS NOTES
06/11/24 1300   Discharge Planning   Living Arrangements Spouse/significant other   Support Systems Spouse/significant other;Children   Type of Residence Private residence   Home or Post Acute Services Post acute facilities (Rehab/SNF/etc)   Type of Post Acute Facility Services Skilled nursing   Patient expects to be discharged to: SNF   Does the patient need discharge transport arranged? Yes   RoundTrip coordination needed? Yes   Has discharge transport been arranged? Yes   What day is the transport expected? 06/11/24   What time is the transport expected? 1500     Discharge Transfer to Facility  Pt will discharge today to:  SNF  Facility name:  [facility] phone number:  [phone]  Unit secretary aware:  Yes  Bedside nurse (Luke) aware to call report:  Yes  Phone number for report:  [phone]  Ambulance transport has been arranged:  Yes  Ambulance company:  [facility]  Ambulance phone number:  [phone]   time:  3pm  Pt and spouse aware.  MICHELLE Vera

## 2024-06-14 ENCOUNTER — APPOINTMENT (OUTPATIENT)
Dept: RADIOLOGY | Facility: HOSPITAL | Age: 89
End: 2024-06-14
Payer: MEDICARE

## 2024-06-14 ENCOUNTER — HOSPITAL ENCOUNTER (INPATIENT)
Facility: HOSPITAL | Age: 89
DRG: 727 | End: 2024-06-14
Attending: INTERNAL MEDICINE | Admitting: INTERNAL MEDICINE
Payer: MEDICARE

## 2024-06-14 ENCOUNTER — APPOINTMENT (OUTPATIENT)
Dept: CARDIOLOGY | Facility: HOSPITAL | Age: 89
End: 2024-06-14
Payer: MEDICARE

## 2024-06-14 DIAGNOSIS — N49.2 CELLULITIS OF SCROTUM: Primary | ICD-10-CM

## 2024-06-14 PROBLEM — L03.311 ABDOMINAL WALL CELLULITIS: Status: ACTIVE | Noted: 2024-06-14

## 2024-06-14 PROBLEM — J96.21 ACUTE ON CHRONIC RESPIRATORY FAILURE WITH HYPOXIA (MULTI): Status: ACTIVE | Noted: 2023-12-26

## 2024-06-14 PROBLEM — J90 RECURRENT PLEURAL EFFUSION: Status: ACTIVE | Noted: 2024-06-14

## 2024-06-14 LAB
ALBUMIN SERPL BCP-MCNC: 3 G/DL (ref 3.4–5)
ALP SERPL-CCNC: 60 U/L (ref 33–136)
ALT SERPL W P-5'-P-CCNC: 14 U/L (ref 10–52)
ANION GAP SERPL CALC-SCNC: 12 MMOL/L (ref 10–20)
AST SERPL W P-5'-P-CCNC: 31 U/L (ref 9–39)
BASOPHILS # BLD AUTO: 0.01 X10*3/UL (ref 0–0.1)
BASOPHILS NFR BLD AUTO: 0.2 %
BILIRUB SERPL-MCNC: 0.3 MG/DL (ref 0–1.2)
BNP SERPL-MCNC: 891 PG/ML (ref 0–99)
BUN SERPL-MCNC: 51 MG/DL (ref 6–23)
CALCIUM SERPL-MCNC: 8.5 MG/DL (ref 8.6–10.3)
CHLORIDE SERPL-SCNC: 107 MMOL/L (ref 98–107)
CO2 SERPL-SCNC: 26 MMOL/L (ref 21–32)
CREAT SERPL-MCNC: 3.5 MG/DL (ref 0.5–1.3)
CRP SERPL-MCNC: 10.32 MG/DL
EGFRCR SERPLBLD CKD-EPI 2021: 16 ML/MIN/1.73M*2
EOSINOPHIL # BLD AUTO: 0.24 X10*3/UL (ref 0–0.4)
EOSINOPHIL NFR BLD AUTO: 5.6 %
ERYTHROCYTE [DISTWIDTH] IN BLOOD BY AUTOMATED COUNT: 16.2 % (ref 11.5–14.5)
ERYTHROCYTE [SEDIMENTATION RATE] IN BLOOD BY WESTERGREN METHOD: 38 MM/H (ref 0–20)
GLUCOSE SERPL-MCNC: 104 MG/DL (ref 74–99)
HCT VFR BLD AUTO: 28 % (ref 41–52)
HGB BLD-MCNC: 8.6 G/DL (ref 13.5–17.5)
HOLD SPECIMEN: NORMAL
IMM GRANULOCYTES # BLD AUTO: 0.08 X10*3/UL (ref 0–0.5)
IMM GRANULOCYTES NFR BLD AUTO: 1.9 % (ref 0–0.9)
LACTATE SERPL-SCNC: 1.3 MMOL/L (ref 0.4–2)
LIPASE SERPL-CCNC: 26 U/L (ref 9–82)
LYMPHOCYTES # BLD AUTO: 0.3 X10*3/UL (ref 0.8–3)
LYMPHOCYTES NFR BLD AUTO: 7 %
MCH RBC QN AUTO: 29.4 PG (ref 26–34)
MCHC RBC AUTO-ENTMCNC: 30.7 G/DL (ref 32–36)
MCV RBC AUTO: 96 FL (ref 80–100)
MONOCYTES # BLD AUTO: 0.49 X10*3/UL (ref 0.05–0.8)
MONOCYTES NFR BLD AUTO: 11.5 %
NEUTROPHILS # BLD AUTO: 3.14 X10*3/UL (ref 1.6–5.5)
NEUTROPHILS NFR BLD AUTO: 73.8 %
NRBC BLD-RTO: 0 /100 WBCS (ref 0–0)
PLATELET # BLD AUTO: 159 X10*3/UL (ref 150–450)
POTASSIUM SERPL-SCNC: 5.1 MMOL/L (ref 3.5–5.3)
PROT SERPL-MCNC: 6.2 G/DL (ref 6.4–8.2)
RBC # BLD AUTO: 2.93 X10*6/UL (ref 4.5–5.9)
SODIUM SERPL-SCNC: 140 MMOL/L (ref 136–145)
WBC # BLD AUTO: 4.3 X10*3/UL (ref 4.4–11.3)

## 2024-06-14 PROCEDURE — 83690 ASSAY OF LIPASE: CPT | Performed by: PHYSICIAN ASSISTANT

## 2024-06-14 PROCEDURE — 83605 ASSAY OF LACTIC ACID: CPT | Performed by: PHYSICIAN ASSISTANT

## 2024-06-14 PROCEDURE — 83880 ASSAY OF NATRIURETIC PEPTIDE: CPT | Performed by: SPECIALIST

## 2024-06-14 PROCEDURE — 87040 BLOOD CULTURE FOR BACTERIA: CPT | Mod: ELYLAB | Performed by: PHYSICIAN ASSISTANT

## 2024-06-14 PROCEDURE — 1200000002 HC GENERAL ROOM WITH TELEMETRY DAILY

## 2024-06-14 PROCEDURE — 96367 TX/PROPH/DG ADDL SEQ IV INF: CPT

## 2024-06-14 PROCEDURE — 93005 ELECTROCARDIOGRAM TRACING: CPT

## 2024-06-14 PROCEDURE — 84075 ASSAY ALKALINE PHOSPHATASE: CPT | Performed by: PHYSICIAN ASSISTANT

## 2024-06-14 PROCEDURE — 96366 THER/PROPH/DIAG IV INF ADDON: CPT

## 2024-06-14 PROCEDURE — 2500000004 HC RX 250 GENERAL PHARMACY W/ HCPCS (ALT 636 FOR OP/ED): Performed by: PHYSICIAN ASSISTANT

## 2024-06-14 PROCEDURE — 85025 COMPLETE CBC W/AUTO DIFF WBC: CPT | Performed by: PHYSICIAN ASSISTANT

## 2024-06-14 PROCEDURE — 85652 RBC SED RATE AUTOMATED: CPT | Performed by: PHYSICIAN ASSISTANT

## 2024-06-14 PROCEDURE — 99291 CRITICAL CARE FIRST HOUR: CPT | Mod: 25

## 2024-06-14 PROCEDURE — 74176 CT ABD & PELVIS W/O CONTRAST: CPT

## 2024-06-14 PROCEDURE — 76870 US EXAM SCROTUM: CPT | Performed by: RADIOLOGY

## 2024-06-14 PROCEDURE — 93975 VASCULAR STUDY: CPT

## 2024-06-14 PROCEDURE — 71046 X-RAY EXAM CHEST 2 VIEWS: CPT | Performed by: RADIOLOGY

## 2024-06-14 PROCEDURE — 93975 VASCULAR STUDY: CPT | Performed by: RADIOLOGY

## 2024-06-14 PROCEDURE — 86140 C-REACTIVE PROTEIN: CPT | Performed by: PHYSICIAN ASSISTANT

## 2024-06-14 PROCEDURE — 2500000005 HC RX 250 GENERAL PHARMACY W/O HCPCS: Performed by: PHYSICIAN ASSISTANT

## 2024-06-14 PROCEDURE — 71046 X-RAY EXAM CHEST 2 VIEWS: CPT

## 2024-06-14 PROCEDURE — 96365 THER/PROPH/DIAG IV INF INIT: CPT

## 2024-06-14 PROCEDURE — 2500000004 HC RX 250 GENERAL PHARMACY W/ HCPCS (ALT 636 FOR OP/ED): Performed by: INTERNAL MEDICINE

## 2024-06-14 PROCEDURE — 74176 CT ABD & PELVIS W/O CONTRAST: CPT | Performed by: RADIOLOGY

## 2024-06-14 PROCEDURE — 36415 COLL VENOUS BLD VENIPUNCTURE: CPT | Performed by: PHYSICIAN ASSISTANT

## 2024-06-14 PROCEDURE — 99223 1ST HOSP IP/OBS HIGH 75: CPT | Performed by: INTERNAL MEDICINE

## 2024-06-14 RX ORDER — PANTOPRAZOLE SODIUM 40 MG/10ML
40 INJECTION, POWDER, LYOPHILIZED, FOR SOLUTION INTRAVENOUS
Status: DISCONTINUED | OUTPATIENT
Start: 2024-06-15 | End: 2024-06-16

## 2024-06-14 RX ORDER — POLYETHYLENE GLYCOL 3350 17 G/17G
17 POWDER, FOR SOLUTION ORAL DAILY PRN
Status: DISCONTINUED | OUTPATIENT
Start: 2024-06-14 | End: 2024-06-19 | Stop reason: HOSPADM

## 2024-06-14 RX ORDER — TALC
3 POWDER (GRAM) TOPICAL NIGHTLY PRN
Status: DISCONTINUED | OUTPATIENT
Start: 2024-06-14 | End: 2024-06-19 | Stop reason: HOSPADM

## 2024-06-14 RX ORDER — ACETAMINOPHEN 160 MG/5ML
650 SOLUTION ORAL EVERY 4 HOURS PRN
Status: DISCONTINUED | OUTPATIENT
Start: 2024-06-14 | End: 2024-06-19 | Stop reason: HOSPADM

## 2024-06-14 RX ORDER — ACETAMINOPHEN 325 MG/1
650 TABLET ORAL EVERY 4 HOURS PRN
Status: DISCONTINUED | OUTPATIENT
Start: 2024-06-14 | End: 2024-06-19 | Stop reason: HOSPADM

## 2024-06-14 RX ORDER — METOPROLOL TARTRATE 1 MG/ML
2.5 INJECTION, SOLUTION INTRAVENOUS ONCE
Status: COMPLETED | OUTPATIENT
Start: 2024-06-14 | End: 2024-06-14

## 2024-06-14 RX ORDER — DILTIAZEM HCL/D5W 125 MG/125
5 PLASTIC BAG, INJECTION (ML) INTRAVENOUS CONTINUOUS
Status: DISCONTINUED | OUTPATIENT
Start: 2024-06-14 | End: 2024-06-15

## 2024-06-14 RX ORDER — ONDANSETRON HYDROCHLORIDE 2 MG/ML
4 INJECTION, SOLUTION INTRAVENOUS EVERY 8 HOURS PRN
Status: DISCONTINUED | OUTPATIENT
Start: 2024-06-14 | End: 2024-06-19 | Stop reason: HOSPADM

## 2024-06-14 RX ORDER — ONDANSETRON 4 MG/1
4 TABLET, FILM COATED ORAL EVERY 8 HOURS PRN
Status: DISCONTINUED | OUTPATIENT
Start: 2024-06-14 | End: 2024-06-19 | Stop reason: HOSPADM

## 2024-06-14 RX ORDER — ENOXAPARIN SODIUM 150 MG/ML
1 INJECTION SUBCUTANEOUS EVERY 24 HOURS
Status: DISCONTINUED | OUTPATIENT
Start: 2024-06-14 | End: 2024-06-19 | Stop reason: HOSPADM

## 2024-06-14 RX ORDER — BENZONATATE 100 MG/1
100 CAPSULE ORAL 3 TIMES DAILY PRN
Status: DISCONTINUED | OUTPATIENT
Start: 2024-06-14 | End: 2024-06-19 | Stop reason: HOSPADM

## 2024-06-14 RX ORDER — ACETAMINOPHEN 650 MG/1
650 SUPPOSITORY RECTAL EVERY 4 HOURS PRN
Status: DISCONTINUED | OUTPATIENT
Start: 2024-06-14 | End: 2024-06-19 | Stop reason: HOSPADM

## 2024-06-14 RX ORDER — PANTOPRAZOLE SODIUM 40 MG/1
40 TABLET, DELAYED RELEASE ORAL
Status: DISCONTINUED | OUTPATIENT
Start: 2024-06-15 | End: 2024-06-16

## 2024-06-14 SDOH — SOCIAL STABILITY: SOCIAL INSECURITY: ABUSE: ADULT

## 2024-06-14 SDOH — SOCIAL STABILITY: SOCIAL INSECURITY: HAS ANYONE EVER THREATENED TO HURT YOUR FAMILY OR YOUR PETS?: NO

## 2024-06-14 SDOH — ECONOMIC STABILITY: INCOME INSECURITY: IN THE LAST 12 MONTHS, WAS THERE A TIME WHEN YOU WERE NOT ABLE TO PAY THE MORTGAGE OR RENT ON TIME?: NO

## 2024-06-14 SDOH — SOCIAL STABILITY: SOCIAL INSECURITY: WERE YOU ABLE TO COMPLETE ALL THE BEHAVIORAL HEALTH SCREENINGS?: YES

## 2024-06-14 SDOH — SOCIAL STABILITY: SOCIAL INSECURITY: ARE THERE ANY APPARENT SIGNS OF INJURIES/BEHAVIORS THAT COULD BE RELATED TO ABUSE/NEGLECT?: NO

## 2024-06-14 SDOH — ECONOMIC STABILITY: INCOME INSECURITY: HOW HARD IS IT FOR YOU TO PAY FOR THE VERY BASICS LIKE FOOD, HOUSING, MEDICAL CARE, AND HEATING?: NOT VERY HARD

## 2024-06-14 SDOH — ECONOMIC STABILITY: HOUSING INSECURITY: IN THE LAST 12 MONTHS, HOW MANY PLACES HAVE YOU LIVED?: 1

## 2024-06-14 SDOH — SOCIAL STABILITY: SOCIAL INSECURITY: DO YOU FEEL ANYONE HAS EXPLOITED OR TAKEN ADVANTAGE OF YOU FINANCIALLY OR OF YOUR PERSONAL PROPERTY?: NO

## 2024-06-14 SDOH — SOCIAL STABILITY: SOCIAL INSECURITY: HAVE YOU HAD ANY THOUGHTS OF HARMING ANYONE ELSE?: YES

## 2024-06-14 SDOH — SOCIAL STABILITY: SOCIAL INSECURITY: DO YOU FEEL UNSAFE GOING BACK TO THE PLACE WHERE YOU ARE LIVING?: NO

## 2024-06-14 SDOH — SOCIAL STABILITY: SOCIAL INSECURITY: ARE YOU OR HAVE YOU BEEN THREATENED OR ABUSED PHYSICALLY, EMOTIONALLY, OR SEXUALLY BY ANYONE?: NO

## 2024-06-14 SDOH — SOCIAL STABILITY: SOCIAL INSECURITY: HAVE YOU HAD THOUGHTS OF HARMING ANYONE ELSE?: NO

## 2024-06-14 SDOH — SOCIAL STABILITY: SOCIAL INSECURITY: DOES ANYONE TRY TO KEEP YOU FROM HAVING/CONTACTING OTHER FRIENDS OR DOING THINGS OUTSIDE YOUR HOME?: NO

## 2024-06-14 ASSESSMENT — ACTIVITIES OF DAILY LIVING (ADL)
GROOMING: NEEDS ASSISTANCE
HEARING - LEFT EAR: FUNCTIONAL
ASSISTIVE_DEVICE: WALKER
WALKS IN HOME: NEEDS ASSISTANCE
DRESSING YOURSELF: NEEDS ASSISTANCE
ADEQUATE_TO_COMPLETE_ADL: YES
JUDGMENT_ADEQUATE_SAFELY_COMPLETE_DAILY_ACTIVITIES: YES
FEEDING YOURSELF: NEEDS ASSISTANCE
LACK_OF_TRANSPORTATION: NO
BATHING: NEEDS ASSISTANCE
PATIENT'S MEMORY ADEQUATE TO SAFELY COMPLETE DAILY ACTIVITIES?: YES
TOILETING: NEEDS ASSISTANCE
HEARING - RIGHT EAR: FUNCTIONAL

## 2024-06-14 ASSESSMENT — ENCOUNTER SYMPTOMS
NERVOUS/ANXIOUS: 1
SLEEP DISTURBANCE: 1
MYALGIAS: 1
WEAKNESS: 1
ENDOCRINE NEGATIVE: 1
PALPITATIONS: 1
FATIGUE: 1
HEMATOLOGIC/LYMPHATIC NEGATIVE: 1
ALLERGIC/IMMUNOLOGIC NEGATIVE: 1
ABDOMINAL PAIN: 1
EYES NEGATIVE: 1
WHEEZING: 1
SHORTNESS OF BREATH: 1
UNEXPECTED WEIGHT CHANGE: 1
APPETITE CHANGE: 1
ABDOMINAL DISTENTION: 1
ARTHRALGIAS: 1
COUGH: 1

## 2024-06-14 ASSESSMENT — COGNITIVE AND FUNCTIONAL STATUS - GENERAL
DRESSING REGULAR LOWER BODY CLOTHING: TOTAL
TOILETING: TOTAL
TOILETING: TOTAL
DRESSING REGULAR LOWER BODY CLOTHING: TOTAL
MOVING TO AND FROM BED TO CHAIR: TOTAL
HELP NEEDED FOR BATHING: TOTAL
PERSONAL GROOMING: TOTAL
CLIMB 3 TO 5 STEPS WITH RAILING: TOTAL
STANDING UP FROM CHAIR USING ARMS: TOTAL
MOVING FROM LYING ON BACK TO SITTING ON SIDE OF FLAT BED WITH BEDRAILS: TOTAL
PERSONAL GROOMING: TOTAL
WALKING IN HOSPITAL ROOM: TOTAL
MOBILITY SCORE: 6
TURNING FROM BACK TO SIDE WHILE IN FLAT BAD: TOTAL
EATING MEALS: A LITTLE
WALKING IN HOSPITAL ROOM: TOTAL
DRESSING REGULAR UPPER BODY CLOTHING: TOTAL
CLIMB 3 TO 5 STEPS WITH RAILING: TOTAL
EATING MEALS: A LITTLE
PATIENT BASELINE BEDBOUND: YES
MOBILITY SCORE: 6
MOVING TO AND FROM BED TO CHAIR: TOTAL
MOVING FROM LYING ON BACK TO SITTING ON SIDE OF FLAT BED WITH BEDRAILS: TOTAL
STANDING UP FROM CHAIR USING ARMS: TOTAL
DAILY ACTIVITIY SCORE: 8
TURNING FROM BACK TO SIDE WHILE IN FLAT BAD: TOTAL
DRESSING REGULAR UPPER BODY CLOTHING: TOTAL
HELP NEEDED FOR BATHING: TOTAL
DAILY ACTIVITIY SCORE: 8

## 2024-06-14 ASSESSMENT — LIFESTYLE VARIABLES
EVER FELT BAD OR GUILTY ABOUT YOUR DRINKING: NO
AUDIT-C TOTAL SCORE: 0
SKIP TO QUESTIONS 9-10: 1
HOW OFTEN DO YOU HAVE A DRINK CONTAINING ALCOHOL: NEVER
AUDIT-C TOTAL SCORE: 0
HAVE PEOPLE ANNOYED YOU BY CRITICIZING YOUR DRINKING: NO
HOW OFTEN DO YOU HAVE 6 OR MORE DRINKS ON ONE OCCASION: NEVER
EVER HAD A DRINK FIRST THING IN THE MORNING TO STEADY YOUR NERVES TO GET RID OF A HANGOVER: NO
HAVE YOU EVER FELT YOU SHOULD CUT DOWN ON YOUR DRINKING: NO
TOTAL SCORE: 0
HOW MANY STANDARD DRINKS CONTAINING ALCOHOL DO YOU HAVE ON A TYPICAL DAY: PATIENT DOES NOT DRINK

## 2024-06-14 ASSESSMENT — PATIENT HEALTH QUESTIONNAIRE - PHQ9
2. FEELING DOWN, DEPRESSED OR HOPELESS: NOT AT ALL
1. LITTLE INTEREST OR PLEASURE IN DOING THINGS: NOT AT ALL
SUM OF ALL RESPONSES TO PHQ9 QUESTIONS 1 & 2: 0

## 2024-06-14 ASSESSMENT — PAIN DESCRIPTION - LOCATION: LOCATION: GROIN

## 2024-06-14 ASSESSMENT — PAIN SCALES - GENERAL
PAINLEVEL_OUTOF10: 0 - NO PAIN
PAINLEVEL_OUTOF10: 8

## 2024-06-14 ASSESSMENT — PAIN - FUNCTIONAL ASSESSMENT: PAIN_FUNCTIONAL_ASSESSMENT: 0-10

## 2024-06-14 NOTE — ED PROVIDER NOTES
HPI   Chief Complaint   Patient presents with    Groin Swelling     Cellulitis       A 89-year-old male patient with history of obesity, polycythemia, hypertension, hyperlipidemia, sleep apnea, heart failure, thrombocytopenia, mesothelioma brought to the emergency department via EMS from nursing home secondary to scrotal swelling, erythema extends up into the abdomen.  Patient rates his pain 8 out of 10 on the pain scale.  For this purpose comes in the emergency department today further evaluation.  Otherwise no other complaints at present time.                          No data recorded                     Patient History   Past Medical History:   Diagnosis Date    Acute embolism and thrombosis of unspecified deep veins of unspecified lower extremity (Multi)     Acute embolism and thrombosis of unspecified deep veins of unspecified lower extremity    Dental disease     Upper and lower dentures    Encounter for preprocedural cardiovascular examination 11/02/2021    Pre-operative cardiovascular examination    Obesity, unspecified 07/15/2022    Class 2 obesity with body mass index (BMI) of 38.0 to 38.9 in adult    Personal history of diseases of the blood and blood-forming organs and certain disorders involving the immune mechanism     History of polycythemia    Personal history of other diseases of male genital organs     History of benign prostatic hyperplasia    Personal history of other diseases of the circulatory system     History of hypertension    Personal history of other diseases of the circulatory system     History of cardiac arrhythmia    Personal history of other diseases of the circulatory system 10/21/2021    History of abnormal electrocardiography    Personal history of other diseases of the circulatory system     History of essential hypertension    Personal history of other diseases of the nervous system and sense organs     History of sleep apnea    Personal history of other malignant neoplasm of  skin     History of malignant neoplasm of skin    Personal history of other specified conditions     History of balance disorder    Personal history of other venous thrombosis and embolism     History of deep venous thrombosis     Past Surgical History:   Procedure Laterality Date    OTHER SURGICAL HISTORY  2019    Hernia repair    OTHER SURGICAL HISTORY  2019    Tonsillectomy with adenoidectomy    OTHER SURGICAL HISTORY  2019    Cataract surgery    OTHER SURGICAL HISTORY  06/10/2022    Pulmonary decortication    OTHER SURGICAL HISTORY  2021    Knee replacement    OTHER SURGICAL HISTORY  2020    Cardioversion    OTHER SURGICAL HISTORY  2020    Finger surgical procedure    OTHER SURGICAL HISTORY  2022    Cardiac catheterization with stent placement    OTHER SURGICAL HISTORY  2022    Pulmonary decortication    OTHER SURGICAL HISTORY  10/21/2021    Back surgery    OTHER SURGICAL HISTORY  2021    Colonoscopy    OTHER SURGICAL HISTORY  2021    Inguinal hernia repair    OTHER SURGICAL HISTORY  2021    Trigger finger repair    OTHER SURGICAL HISTORY  2021    Umbilical hernia repair    OTHER SURGICAL HISTORY  2021    Carpal tunnel surgery     Family History   Problem Relation Name Age of Onset    Accidental death Mother      Coronary artery disease Mother      Other (Cardiac disorder) Father      Dementia Father      Cancer Father      Colon cancer Daughter       Social History     Tobacco Use    Smoking status: Former     Current packs/day: 0.00     Average packs/day: 1 pack/day for 10.0 years (10.0 ttl pk-yrs)     Types: Cigarettes     Start date:      Quit date:      Years since quittin.5    Smokeless tobacco: Never   Vaping Use    Vaping status: Never Used   Substance Use Topics    Alcohol use: Yes     Alcohol/week: 4.0 standard drinks of alcohol     Types: 4 Cans of beer per week     Comment: 4 beers weekly    Drug use: Never        Physical Exam   ED Triage Vitals [06/14/24 1408]   Temperature Heart Rate Respirations BP   36.1 °C (97 °F) (!) 126 18 107/76      Pulse Ox Temp Source Heart Rate Source Patient Position   97 % Temporal -- --      BP Location FiO2 (%)     -- --       Physical Exam  Constitutional:       General: He is in acute distress.      Appearance: Normal appearance. He is ill-appearing.   HENT:      Head: Normocephalic and atraumatic.      Nose: Nose normal.   Eyes:      Extraocular Movements: Extraocular movements intact.      Conjunctiva/sclera: Conjunctivae normal.      Pupils: Pupils are equal, round, and reactive to light.   Cardiovascular:      Rate and Rhythm: Normal rate and regular rhythm.   Pulmonary:      Effort: Pulmonary effort is normal. No respiratory distress.      Breath sounds: Normal breath sounds. No stridor. No wheezing.   Abdominal:      General: There is distension.   Genitourinary:     Comments: Scrotal edema with erythema more significant on the right, pain with palpation  Musculoskeletal:         General: Normal range of motion.      Cervical back: Normal range of motion.   Skin:     General: Skin is warm and dry.      Findings: Erythema (Erythema lower abdomen running up the left side of the abdomen) present.   Neurological:      General: No focal deficit present.      Mental Status: He is alert and oriented to person, place, and time. Mental status is at baseline.         ED Course & MDM   ED Course as of 06/22/24 0731   Fri Jun 14, 2024   1643 HPI      Patient presented for evaluation of discoloration to left side of the abdomen and scrotum.  Patient significant other notes that she saw the swelling and erythema today.  She states he was recently in [facility].  She states he was treated 1 time with chemotherapy and he had side effects that caused him to be admitted to the hospital.         Physical Exam     Appearance: Lethargic, ill-appearing  Skin: Pale, erythema to the scrotum  and left lower quadrant of the abdomen  Eyes: Pupils equal and reactive, EOMI  ENT: Dry  Neck: Supple, no meningeal signs.  Pulmonary: Decreased breath sounds lower fields bilaterally  Cardiac: Tachycardic rate, regular rhythm.  Radial and DP Pulses equal BL  Abdomen: Soft, left lower quadrant tenderness  Genitourinary: Erythema to the scrotum diffusely without tenderness, no crepitus  Musculoskeletal: Edema to bilateral lower extremities.  Neurological: Lethargic, nonparticipatory with exam    Medical Decision Making:     Cellulitis present on exam to left lower abdomen.  Tachycardic.  Multiple SIRS criteria.  Concern for infection.  Septic protocol started in ED.  Patient started on broad-spectrum antibiotics.  Patient will be admitted for further evaluation.    35 minutes of critical care separate from billable procedures for sepsis.      I personally saw the patient and made/approved the management plan and take responsibility for the patient management.      Disclaimer: This note was dictated by speech recognition. Minor errors in transcription may be present.  Please call if questions.   [JA]      ED Course User Index  [JA] Jovany Beaver DO         Diagnoses as of 06/22/24 0731   Cellulitis of scrotum       Medical Decision Making  A 89-year-old male patient with history of obesity, polycythemia, hypertension, hyperlipidemia, sleep apnea, heart failure, thrombocytopenia, mesothelioma brought to the emergency department via EMS from nursing home secondary to scrotal swelling, erythema extends up into the abdomen.  Patient rates his pain 8 out of 10 on the pain scale.  For this purpose comes in the emergency department today further evaluation.  Otherwise no other complaints at present time.    Scrotum ultrasound ordered to rule out torsion, hydrocele, epididymitis as well as CT abdomen pelvis to rule out Oksana's gangrene, fluid collections or surgical need.  Sepsis order set used his patient has a heart  rate of 126 blood pressure is soft and concern for infection.  IV vancomycin and Zosyn were started as well as blood cultures ordered.  Patient given only 500 mL of fluid as patient has significant history of CHF poor renal function.  Erythema extending up to the low abdomen and then up to the right side of the abdomen.    EKG: My EKG interpretation, 126 bpm,  no ST elevations, no STEMI    Patient's lipase 26, metabolic panel shows a creatinine 3.5 GFR 16 BUN 51, CRP 10.3 lactate 1.3 sed rate 38 blood cell count 4.3.  Patient's hemoglobin 8.6 which is baseline for the patient.  Patient's ultrasound of the scrotum shows potential scrotal abscess.  I called and spoke to Dr. D. Amico with urology.  He states the patient needs to be admitted on aggressive antibiotics.  He does not believe this this is an abscess but rather an epididymal cyst.  He states he will gladly be on consult.    Patient CT study shows significant development of abdominal wall edema in the subcutaneous tissues worse on the left.  Patient also has right pleural effusion.  Patient's chest x-ray is consistent with complete opacification with volume loss of left hemithorax increased density right lung base reflecting small to moderate right pleural effusion superimposed pneumonia cannot be excluded.    Historian is the patient       Diagnosis: Scrotal cellulitis, epididymal cyst, abdominal wall cellulitis, community-acquired pneumonia    Discussed the case with Dr. Wagner the hospitalist agrees with the patient      Labs Reviewed   CBC WITH AUTO DIFFERENTIAL - Abnormal       Result Value    WBC 4.3 (*)     nRBC 0.0      RBC 2.93 (*)     Hemoglobin 8.6 (*)     Hematocrit 28.0 (*)     MCV 96      MCH 29.4      MCHC 30.7 (*)     RDW 16.2 (*)     Platelets 159      Neutrophils % 73.8      Immature Granulocytes %, Automated 1.9 (*)     Lymphocytes % 7.0      Monocytes % 11.5      Eosinophils % 5.6      Basophils % 0.2      Neutrophils Absolute 3.14       Immature Granulocytes Absolute, Automated 0.08      Lymphocytes Absolute 0.30 (*)     Monocytes Absolute 0.49      Eosinophils Absolute 0.24      Basophils Absolute 0.01     COMPREHENSIVE METABOLIC PANEL - Abnormal    Glucose 104 (*)     Sodium 140      Potassium 5.1      Chloride 107      Bicarbonate 26      Anion Gap 12      Urea Nitrogen 51 (*)     Creatinine 3.50 (*)     eGFR 16 (*)     Calcium 8.5 (*)     Albumin 3.0 (*)     Alkaline Phosphatase 60      Total Protein 6.2 (*)     AST 31      Bilirubin, Total 0.3      ALT 14     C-REACTIVE PROTEIN - Abnormal    C-Reactive Protein 10.32 (*)    SEDIMENTATION RATE, AUTOMATED - Abnormal    Sedimentation Rate 38 (*)    CBC - Abnormal    WBC 4.8      nRBC 0.0      RBC 2.79 (*)     Hemoglobin 8.2 (*)     Hematocrit 27.2 (*)     MCV 98      MCH 29.4      MCHC 30.1 (*)     RDW 16.1 (*)     Platelets 142 (*)    COMPREHENSIVE METABOLIC PANEL - Abnormal    Glucose 86      Sodium 139      Potassium 5.0      Chloride 107      Bicarbonate 23      Anion Gap 14      Urea Nitrogen 48 (*)     Creatinine 3.35 (*)     eGFR 17 (*)     Calcium 8.3 (*)     Albumin 2.9 (*)     Alkaline Phosphatase 59      Total Protein 5.8 (*)     AST 28      Bilirubin, Total 0.4      ALT 13     B-TYPE NATRIURETIC PEPTIDE - Abnormal     (*)     Narrative:        <100 pg/mL - Heart failure unlikely  100-299 pg/mL - Intermediate probability of acute heart                  failure exacerbation. Correlate with clinical                  context and patient history.    >=300 pg/mL - Heart Failure likely. Correlate with clinical                  context and patient history.    BNP testing is performed using different testing methodology at [facility] than at other [facility] hospitals. Direct result comparisons should only be made within the same method.      BASIC METABOLIC PANEL - Abnormal    Glucose 104 (*)     Sodium 140      Potassium 4.6      Chloride 109 (*)     Bicarbonate 23       Anion Gap 13      Urea Nitrogen 46 (*)     Creatinine 3.22 (*)     eGFR 18 (*)     Calcium 7.8 (*)    CBC WITH AUTO DIFFERENTIAL - Abnormal    WBC 4.8      nRBC 0.0      RBC 2.57 (*)     Hemoglobin 7.5 (*)     Hematocrit 24.3 (*)     MCV 95      MCH 29.2      MCHC 30.9 (*)     RDW 16.0 (*)     Platelets 143 (*)     Neutrophils % 72.4      Immature Granulocytes %, Automated 1.9 (*)     Lymphocytes % 7.8      Monocytes % 13.5      Eosinophils % 4.4      Basophils % 0.0      Neutrophils Absolute 3.44      Immature Granulocytes Absolute, Automated 0.09      Lymphocytes Absolute 0.37 (*)     Monocytes Absolute 0.64      Eosinophils Absolute 0.21      Basophils Absolute 0.00     MAGNESIUM - Abnormal    Magnesium 1.56 (*)    HEMOGLOBIN AND HEMATOCRIT, BLOOD - Abnormal    Hemoglobin 7.7 (*)     Hematocrit 24.9 (*)    BASIC METABOLIC PANEL - Abnormal    Glucose 97      Sodium 141      Potassium 4.8      Chloride 110 (*)     Bicarbonate 24      Anion Gap 12      Urea Nitrogen 48 (*)     Creatinine 3.31 (*)     eGFR 17 (*)     Calcium 8.0 (*)    CBC WITH AUTO DIFFERENTIAL - Abnormal    WBC 4.7      nRBC 0.0      RBC 2.61 (*)     Hemoglobin 7.7 (*)     Hematocrit 25.1 (*)     MCV 96      MCH 29.5      MCHC 30.7 (*)     RDW 16.3 (*)     Platelets 130 (*)     Neutrophils % 67.9      Immature Granulocytes %, Automated 2.6 (*)     Lymphocytes % 9.6      Monocytes % 15.8      Eosinophils % 4.1      Basophils % 0.0      Neutrophils Absolute 3.19      Immature Granulocytes Absolute, Automated 0.12      Lymphocytes Absolute 0.45 (*)     Monocytes Absolute 0.74      Eosinophils Absolute 0.19      Basophils Absolute 0.00     MAGNESIUM - Abnormal    Magnesium 1.50 (*)    BASIC METABOLIC PANEL - Abnormal    Glucose 84      Sodium 140      Potassium 4.8      Chloride 110 (*)     Bicarbonate 22      Anion Gap 13      Urea Nitrogen 47 (*)     Creatinine 3.62 (*)     eGFR 16 (*)     Calcium 8.1 (*)    CBC WITH AUTO DIFFERENTIAL - Abnormal     WBC 4.5      nRBC 0.0      RBC 2.45 (*)     Hemoglobin 7.1 (*)     Hematocrit 23.3 (*)     MCV 95      MCH 29.0      MCHC 30.5 (*)     RDW 16.4 (*)     Platelets 150      Neutrophils % 66.1      Immature Granulocytes %, Automated 1.8 (*)     Lymphocytes % 9.7      Monocytes % 20.2      Eosinophils % 2.0      Basophils % 0.2      Neutrophils Absolute 2.94      Immature Granulocytes Absolute, Automated 0.08      Lymphocytes Absolute 0.43 (*)     Monocytes Absolute 0.90 (*)     Eosinophils Absolute 0.09      Basophils Absolute 0.01     BASIC METABOLIC PANEL - Abnormal    Glucose 81      Sodium 139      Potassium 4.5      Chloride 108 (*)     Bicarbonate 23      Anion Gap 13      Urea Nitrogen 47 (*)     Creatinine 3.67 (*)     eGFR 15 (*)     Calcium 8.2 (*)    CBC WITH AUTO DIFFERENTIAL - Abnormal    WBC 5.3      nRBC 0.0      RBC 2.50 (*)     Hemoglobin 7.4 (*)     Hematocrit 24.4 (*)     MCV 98      MCH 29.6      MCHC 30.3 (*)     RDW 16.4 (*)     Platelets 155      Neutrophils % 67.2      Immature Granulocytes %, Automated 3.2 (*)     Lymphocytes % 8.5      Monocytes % 18.1      Eosinophils % 2.8      Basophils % 0.2      Neutrophils Absolute 3.57      Immature Granulocytes Absolute, Automated 0.17      Lymphocytes Absolute 0.45 (*)     Monocytes Absolute 0.96 (*)     Eosinophils Absolute 0.15      Basophils Absolute 0.01     BLOOD CULTURE - Normal    Blood Culture No growth at 4 days -  FINAL REPORT     BLOOD CULTURE - Normal    Blood Culture No growth at 4 days -  FINAL REPORT     LIPASE - Normal    Lipase 26      Narrative:     Venipuncture immediately after or during the administration of Metamizole may lead to falsely low results. Testing should be performed immediately prior to Metamizole dosing.   LACTATE - Normal    Lactate 1.3      Narrative:     Venipuncture immediately after or during the administration of Metamizole may lead to falsely low results. Testing should be performed immediately  prior to  Metamizole dosing.   VANCOMYCIN - Normal    Vancomycin 17.9      Narrative:     Vancomycin levels can be monitored according to area under the curve (AUC) or concentration (ug/mL). The preferred monitoring strategy is determined by the patient's renal function and indication for therapy.     For AUC monitoring, a random vancomycin level should be interpreted in the context of AUC rather than the concentration at a single point in time.    For concentration monitoring, a trough concentration drawn immediately prior to the next dose is preferred.       Therapeutic ranges using concentration-guided results:  Peak (all ages):                  30.0-40.0 ug/mL  Trough (all ages):                10.0-20.0 ug/mL              VANCOMYCIN - Normal    Vancomycin 15.3      Narrative:     Vancomycin levels can be monitored according to area under the curve (AUC) or concentration (ug/mL). The preferred monitoring strategy is determined by the patient's renal function and indication for therapy.     For AUC monitoring, a random vancomycin level should be interpreted in the context of AUC rather than the concentration at a single point in time.    For concentration monitoring, a trough concentration drawn immediately prior to the next dose is preferred.       Therapeutic ranges using concentration-guided results:  Peak (all ages):                  30.0-40.0 ug/mL  Trough (all ages):                10.0-20.0 ug/mL              VANCOMYCIN - Normal    Vancomycin 14.8      Narrative:     Vancomycin levels can be monitored according to area under the curve (AUC) or concentration (ug/mL). The preferred monitoring strategy is determined by the patient's renal function and indication for therapy.     For AUC monitoring, a random vancomycin level should be interpreted in the context of AUC rather than the concentration at a single point in time.    For concentration monitoring, a trough concentration drawn immediately prior to the next dose  is preferred.       Therapeutic ranges using concentration-guided results:  Peak (all ages):                  30.0-40.0 ug/mL  Trough (all ages):                10.0-20.0 ug/mL              VANCOMYCIN - Normal    Vancomycin 14.2      Narrative:     Vancomycin levels can be monitored according to area under the curve (AUC) or concentration (ug/mL). The preferred monitoring strategy is determined by the patient's renal function and indication for therapy.     For AUC monitoring, a random vancomycin level should be interpreted in the context of AUC rather than the concentration at a single point in time.    For concentration monitoring, a trough concentration drawn immediately prior to the next dose is preferred.       Therapeutic ranges using concentration-guided results:  Peak (all ages):                  30.0-40.0 ug/mL  Trough (all ages):                10.0-20.0 ug/mL              VANCOMYCIN, TROUGH - Normal    Vancomycin, Trough 14.5          US scrotum w doppler   Final Result   3.0 x 3.6 x 6.0 cm encapsulated fluid collection noted which is   extratesticular in location and is seen anterior and superior to the   right testicle. This contains low-level internal echoes and some   thickening of its wall with internal debris. This may represent a   scrotal abscess. Clinical correlation is necessary.        Ectasia of the rete testes on the right.        3 mm cyst right epididymal head.        No epididymal orchitis.        MACRO:   None.        Signed by: Julieta Callahan 6/14/2024 4:59 PM   Dictation workstation:   [id number]      XR chest 2 views   Final Result   1.  Complete opacification with volume loss left hemithorax.   Increased density right lung base reflecting small to moderate right   pleural effusion. Superimposed pneumonia can not be excluded.                  MACRO:   None        Signed by: Josh Soto 6/14/2024 3:36 PM   Dictation workstation:   [id number]      CT abdomen pelvis wo IV contrast    Final Result   1.  The high significant development of abdominal wall edema in the   subcutaneous tissues asymmetrically worse on the left. This does not   appear to especially extend into the scrotum.   2. There is a new moderate right pleural effusion.   3. Persistent left basal consolidation with anterior oval shaped   collection of fluid with similar appearance to prior other than lack   of air bronchograms on this exam compared to the prior. Associated   volume loss.             MACRO:   None        Signed by: Joseph Schoenberger 6/14/2024 3:30 PM   Dictation workstation:   [id number]            Procedure  Procedures     Jesus Grayson PA-C  06/22/24 0731

## 2024-06-15 LAB
ALBUMIN SERPL BCP-MCNC: 2.9 G/DL (ref 3.4–5)
ALP SERPL-CCNC: 59 U/L (ref 33–136)
ALT SERPL W P-5'-P-CCNC: 13 U/L (ref 10–52)
ANION GAP SERPL CALC-SCNC: 14 MMOL/L (ref 10–20)
AST SERPL W P-5'-P-CCNC: 28 U/L (ref 9–39)
ATRIAL RATE: 129 BPM
BILIRUB SERPL-MCNC: 0.4 MG/DL (ref 0–1.2)
BUN SERPL-MCNC: 48 MG/DL (ref 6–23)
CALCIUM SERPL-MCNC: 8.3 MG/DL (ref 8.6–10.3)
CHLORIDE SERPL-SCNC: 107 MMOL/L (ref 98–107)
CO2 SERPL-SCNC: 23 MMOL/L (ref 21–32)
CREAT SERPL-MCNC: 3.35 MG/DL (ref 0.5–1.3)
EGFRCR SERPLBLD CKD-EPI 2021: 17 ML/MIN/1.73M*2
ERYTHROCYTE [DISTWIDTH] IN BLOOD BY AUTOMATED COUNT: 16.1 % (ref 11.5–14.5)
GLUCOSE SERPL-MCNC: 86 MG/DL (ref 74–99)
HCT VFR BLD AUTO: 27.2 % (ref 41–52)
HGB BLD-MCNC: 8.2 G/DL (ref 13.5–17.5)
MCH RBC QN AUTO: 29.4 PG (ref 26–34)
MCHC RBC AUTO-ENTMCNC: 30.1 G/DL (ref 32–36)
MCV RBC AUTO: 98 FL (ref 80–100)
NRBC BLD-RTO: 0 /100 WBCS (ref 0–0)
PLATELET # BLD AUTO: 142 X10*3/UL (ref 150–450)
POTASSIUM SERPL-SCNC: 5 MMOL/L (ref 3.5–5.3)
PROT SERPL-MCNC: 5.8 G/DL (ref 6.4–8.2)
Q ONSET: 229 MS
QRS COUNT: 20 BEATS
QRS DURATION: 78 MS
QT INTERVAL: 324 MS
QTC CALCULATION(BAZETT): 469 MS
QTC FREDERICIA: 415 MS
R AXIS: 81 DEGREES
RBC # BLD AUTO: 2.79 X10*6/UL (ref 4.5–5.9)
SODIUM SERPL-SCNC: 139 MMOL/L (ref 136–145)
T AXIS: 42 DEGREES
T OFFSET: 391 MS
VANCOMYCIN SERPL-MCNC: 15.3 UG/ML (ref 5–20)
VANCOMYCIN SERPL-MCNC: 17.9 UG/ML (ref 5–20)
VENTRICULAR RATE: 126 BPM
WBC # BLD AUTO: 4.8 X10*3/UL (ref 4.4–11.3)

## 2024-06-15 PROCEDURE — 2500000002 HC RX 250 W HCPCS SELF ADMINISTERED DRUGS (ALT 637 FOR MEDICARE OP, ALT 636 FOR OP/ED): Mod: MUE | Performed by: INTERNAL MEDICINE

## 2024-06-15 PROCEDURE — 2500000004 HC RX 250 GENERAL PHARMACY W/ HCPCS (ALT 636 FOR OP/ED): Performed by: INTERNAL MEDICINE

## 2024-06-15 PROCEDURE — 1200000002 HC GENERAL ROOM WITH TELEMETRY DAILY

## 2024-06-15 PROCEDURE — 2500000001 HC RX 250 WO HCPCS SELF ADMINISTERED DRUGS (ALT 637 FOR MEDICARE OP): Performed by: INTERNAL MEDICINE

## 2024-06-15 PROCEDURE — 99233 SBSQ HOSP IP/OBS HIGH 50: CPT | Performed by: INTERNAL MEDICINE

## 2024-06-15 PROCEDURE — 80202 ASSAY OF VANCOMYCIN: CPT | Mod: 91

## 2024-06-15 PROCEDURE — 36415 COLL VENOUS BLD VENIPUNCTURE: CPT | Performed by: INTERNAL MEDICINE

## 2024-06-15 PROCEDURE — 99223 1ST HOSP IP/OBS HIGH 75: CPT | Performed by: INTERNAL MEDICINE

## 2024-06-15 PROCEDURE — 2500000004 HC RX 250 GENERAL PHARMACY W/ HCPCS (ALT 636 FOR OP/ED)

## 2024-06-15 PROCEDURE — 85027 COMPLETE CBC AUTOMATED: CPT | Performed by: INTERNAL MEDICINE

## 2024-06-15 PROCEDURE — 36415 COLL VENOUS BLD VENIPUNCTURE: CPT

## 2024-06-15 PROCEDURE — 84075 ASSAY ALKALINE PHOSPHATASE: CPT | Performed by: INTERNAL MEDICINE

## 2024-06-15 RX ORDER — ALBUTEROL SULFATE 0.83 MG/ML
2.5 SOLUTION RESPIRATORY (INHALATION) EVERY 6 HOURS PRN
Status: DISCONTINUED | OUTPATIENT
Start: 2024-06-15 | End: 2024-06-19 | Stop reason: HOSPADM

## 2024-06-15 RX ORDER — SODIUM CHLORIDE 9 MG/ML
75 INJECTION, SOLUTION INTRAVENOUS CONTINUOUS
Status: ACTIVE | OUTPATIENT
Start: 2024-06-15 | End: 2024-06-16

## 2024-06-15 RX ORDER — METRONIDAZOLE 500 MG/100ML
500 INJECTION, SOLUTION INTRAVENOUS EVERY 8 HOURS
Status: DISCONTINUED | OUTPATIENT
Start: 2024-06-15 | End: 2024-06-19 | Stop reason: HOSPADM

## 2024-06-15 RX ORDER — GUAIFENESIN/DEXTROMETHORPHAN 100-10MG/5
5 SYRUP ORAL EVERY 4 HOURS PRN
Status: DISCONTINUED | OUTPATIENT
Start: 2024-06-15 | End: 2024-06-15

## 2024-06-15 RX ORDER — TAMSULOSIN HYDROCHLORIDE 0.4 MG/1
0.4 CAPSULE ORAL DAILY
Status: DISCONTINUED | OUTPATIENT
Start: 2024-06-15 | End: 2024-06-19 | Stop reason: HOSPADM

## 2024-06-15 RX ORDER — PROMETHAZINE HYDROCHLORIDE AND DEXTROMETHORPHAN HYDROBROMIDE 6.25; 15 MG/5ML; MG/5ML
5 SYRUP ORAL EVERY 4 HOURS PRN
Status: DISCONTINUED | OUTPATIENT
Start: 2024-06-15 | End: 2024-06-15

## 2024-06-15 RX ORDER — ROSUVASTATIN CALCIUM 10 MG/1
10 TABLET, COATED ORAL DAILY
Status: DISCONTINUED | OUTPATIENT
Start: 2024-06-15 | End: 2024-06-19 | Stop reason: HOSPADM

## 2024-06-15 RX ORDER — DILTIAZEM HCL/D5W 125 MG/125
5 PLASTIC BAG, INJECTION (ML) INTRAVENOUS CONTINUOUS
Status: DISCONTINUED | OUTPATIENT
Start: 2024-06-15 | End: 2024-06-18

## 2024-06-15 RX ORDER — VANCOMYCIN HYDROCHLORIDE 1 G/20ML
INJECTION, POWDER, LYOPHILIZED, FOR SOLUTION INTRAVENOUS DAILY PRN
Status: DISCONTINUED | OUTPATIENT
Start: 2024-06-15 | End: 2024-06-19 | Stop reason: HOSPADM

## 2024-06-15 RX ORDER — PROMETHAZINE HYDROCHLORIDE 6.25 MG/5ML
12.5 SYRUP ORAL EVERY 6 HOURS PRN
Status: DISCONTINUED | OUTPATIENT
Start: 2024-06-15 | End: 2024-06-15

## 2024-06-15 RX ORDER — CODEINE PHOSPHATE AND GUAIFENESIN 10; 100 MG/5ML; MG/5ML
5 SOLUTION ORAL EVERY 6 HOURS PRN
Status: DISCONTINUED | OUTPATIENT
Start: 2024-06-15 | End: 2024-06-17

## 2024-06-15 ASSESSMENT — COGNITIVE AND FUNCTIONAL STATUS - GENERAL
STANDING UP FROM CHAIR USING ARMS: TOTAL
DRESSING REGULAR LOWER BODY CLOTHING: TOTAL
HELP NEEDED FOR BATHING: TOTAL
PERSONAL GROOMING: A LITTLE
DRESSING REGULAR UPPER BODY CLOTHING: TOTAL
MOVING FROM LYING ON BACK TO SITTING ON SIDE OF FLAT BED WITH BEDRAILS: A LOT
TURNING FROM BACK TO SIDE WHILE IN FLAT BAD: TOTAL
DAILY ACTIVITIY SCORE: 10
MOVING TO AND FROM BED TO CHAIR: TOTAL
TOILETING: TOTAL
CLIMB 3 TO 5 STEPS WITH RAILING: TOTAL
MOBILITY SCORE: 7
WALKING IN HOSPITAL ROOM: TOTAL
EATING MEALS: A LITTLE

## 2024-06-15 ASSESSMENT — PAIN SCALES - GENERAL: PAINLEVEL_OUTOF10: 0 - NO PAIN

## 2024-06-15 ASSESSMENT — ACTIVITIES OF DAILY LIVING (ADL): LACK_OF_TRANSPORTATION: NO

## 2024-06-15 NOTE — PROGRESS NOTES
Vancomycin Dosing by Pharmacy- FOLLOW UP    Vinicius Arriola is a 89 y.o. year old male who Pharmacy has been consulted for vancomycin dosing for cellulitis, skin and soft tissue. Based on the patient's indication and renal status this patient is being dosed based on a goal AUC of 400-600.     Renal function is currently stable.    Current vancomycin dose: 500 mg given every 24 hours    Estimated vancomycin AUC on current dose: 382 mg/L.hr     Visit Vitals  /58   Pulse 93   Temp 36.1 °C (97 °F) (Temporal)   Resp 19        Lab Results   Component Value Date    CREATININE 3.35 (H) 06/15/2024    CREATININE 3.50 (H) 2024    CREATININE 3.58 (H) 2024    CREATININE 3.76 (H) 06/10/2024        Patient weight is as follows:   Vitals:    24 1408   Weight: 118 kg (260 lb)       Cultures:  No results found for the encounter in last 14 days.       No intake/output data recorded.  I/O during current shift:  I/O this shift:  In: 1040 [IV Piggyback:1040]  Out: -     Temp (24hrs), Av.1 °C (97 °F), Min:36.1 °C (97 °F), Max:36.1 °C (97 °F)      Assessment/Plan    Within goal AUC range. Continue current vancomycin regimen.    This dosing regimen is predicted by [employer] to result in the following pharmacokinetic parameters:  <<<<<PASTE [employer] DATA HERE>>>>>  Loading dose: 2000mg  Regimen: 500 mg IV every 24 hours.  Start time: 05:52 on 06/15/2024  Exposure target: AUC24 (range)400-600 mg/L.hr   AUC24,ss: 382 mg/L.hr  Probability of AUC24 > 400: 42 %  Ctrough,ss: 13.8 mg/L  Probability of Ctrough,ss > 20: 8 %  Probability of nephrotoxicity (Lodise PRAFUL ): 9 %    The next level will be obtained on  at 05:00. May be obtained sooner if clinically indicated.   Will continue to monitor renal function daily while on vancomycin and order serum creatinine at least every 48 hours if not already ordered.  Follow for continued vancomycin needs, clinical response, and signs/symptoms of toxicity.       Kati MCGINNIS  Angela, [facility]

## 2024-06-15 NOTE — H&P
History Of Present Illness  Vinicius Arriola is a 89 y.o. male presenting with skin rash for 3 days.  The rash is erythematous involving the side of the abdomen and the lower of the abdomen extending to the groin area and the back.  Associated with abdominal wall edema.  No fever, nausea, vomiting or diarrhea reported.    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  Was recently admitted at the clinic, and the last decision is to hold chemotherapy and start rehab.    ER course: Patient was awake, alert, oriented, in no acute distress but looked ill.  Patient was afebrile but was tachycardic in the 120s.  Blood pressure was stable.  He is on nasal cannula oxygen 3 L.  Labs showed creatinine 3.5 similar to baseline, and there is no leukocytosis.  Lactate was normal.  He has elevated C-reactive protein 10.32.  Ultrasound of the scrotum did show encapsulated fluid collection measuring up to 3 x 3.6 x 6 cm.  This was reviewed by urology and believed to be hydrocele no abscess.  His chest x-ray did show complete opacification of left hemithorax.  Also increased density in the right lung base reflecting small to moderate right pleural effusion.  Superimposed pneumonia cannot be excluded.  Patient is admitted for further management.    Past Medical History  Past Medical History:   Diagnosis Date    Acute embolism and thrombosis of unspecified deep veins of unspecified lower extremity (Multi)     Acute embolism and thrombosis of unspecified deep veins of unspecified lower extremity    Dental disease     Upper and lower dentures    Encounter for preprocedural cardiovascular examination 11/02/2021    Pre-operative cardiovascular examination    Obesity, unspecified 07/15/2022    Class 2 obesity with body mass index (BMI) of 38.0 to 38.9 in adult    Personal history of diseases of the blood and blood-forming organs and certain disorders involving the immune mechanism     History of polycythemia     Personal history of other diseases of male genital organs     History of benign prostatic hyperplasia    Personal history of other diseases of the circulatory system     History of hypertension    Personal history of other diseases of the circulatory system     History of cardiac arrhythmia    Personal history of other diseases of the circulatory system 10/21/2021    History of abnormal electrocardiography    Personal history of other diseases of the circulatory system     History of essential hypertension    Personal history of other diseases of the nervous system and sense organs     History of sleep apnea    Personal history of other malignant neoplasm of skin     History of malignant neoplasm of skin    Personal history of other specified conditions     History of balance disorder    Personal history of other venous thrombosis and embolism     History of deep venous thrombosis       Surgical History  Past Surgical History:   Procedure Laterality Date    OTHER SURGICAL HISTORY  08/20/2019    Hernia repair    OTHER SURGICAL HISTORY  08/20/2019    Tonsillectomy with adenoidectomy    OTHER SURGICAL HISTORY  08/20/2019    Cataract surgery    OTHER SURGICAL HISTORY  06/10/2022    Pulmonary decortication    OTHER SURGICAL HISTORY  11/02/2021    Knee replacement    OTHER SURGICAL HISTORY  01/02/2020    Cardioversion    OTHER SURGICAL HISTORY  01/02/2020    Finger surgical procedure    OTHER SURGICAL HISTORY  05/13/2022    Cardiac catheterization with stent placement    OTHER SURGICAL HISTORY  05/26/2022    Pulmonary decortication    OTHER SURGICAL HISTORY  10/21/2021    Back surgery    OTHER SURGICAL HISTORY  11/02/2021    Colonoscopy    OTHER SURGICAL HISTORY  11/02/2021    Inguinal hernia repair    OTHER SURGICAL HISTORY  11/02/2021    Trigger finger repair    OTHER SURGICAL HISTORY  11/02/2021    Umbilical hernia repair    OTHER SURGICAL HISTORY  11/04/2021    Carpal tunnel surgery        Social History  He reports  that he quit smoking about 57 years ago. His smoking use included cigarettes. He started smoking about 67 years ago. He has a 10 pack-year smoking history. He has never used smokeless tobacco. He reports current alcohol use of about 4.0 standard drinks of alcohol per week. He reports that he does not use drugs.    Family History  Family History   Problem Relation Name Age of Onset    Accidental death Mother      Coronary artery disease Mother      Other (Cardiac disorder) Father      Dementia Father      Cancer Father      Colon cancer Daughter          Allergies  Benadryl allergy decongestant, Diphenhydramine, and Diphenhydramine hcl    Review of Systems  10/10 points review of system were conducted, negative except as above    Physical Exam  Constitutional:       Appearance: He is obese. He is ill-appearing. He is not diaphoretic.   HENT:      Head: Normocephalic and atraumatic.      Nose: Nose normal.      Mouth/Throat:      Mouth: Mucous membranes are dry.   Eyes:      General: No scleral icterus.     Extraocular Movements: Extraocular movements intact.      Conjunctiva/sclera: Conjunctivae normal.      Pupils: Pupils are equal, round, and reactive to light.   Neck:      Vascular: No carotid bruit.   Cardiovascular:      Rate and Rhythm: Tachycardia present. Rhythm irregular.      Heart sounds: No murmur heard.  Pulmonary:      Breath sounds: Wheezing, rhonchi and rales present.      Comments: Diminished air entry at the bases bilaterally with crackles.  Abdominal:      General: There is distension.      Tenderness: There is no abdominal tenderness. There is no right CVA tenderness, left CVA tenderness or guarding.      Comments: Patient does have abdominal wall edema with evidence of cellulitis involving the left side of the abdominal wall, small area on the right side, small area on the back and also the groin area.   Musculoskeletal:         General: No swelling, tenderness, deformity or signs of injury.  "Normal range of motion.      Cervical back: Normal range of motion and neck supple. No rigidity or tenderness.      Right lower leg: Edema present.      Left lower leg: Edema present.   Skin:     General: Skin is warm and dry.      Findings: Erythema and rash present.   Neurological:      General: No focal deficit present.      Mental Status: He is alert and oriented to person, place, and time. Mental status is at baseline.   Psychiatric:         Mood and Affect: Mood normal.          Last Recorded Vitals  Blood pressure 105/65, pulse (!) 125, temperature 36.1 °C (97 °F), temperature source Temporal, resp. rate 17, height 1.727 m (5' 8\"), weight 118 kg (260 lb), SpO2 97%.    Relevant Results  Scheduled medications  cefepime, 1 g, intravenous, q12h  enoxaparin, 1 mg/kg, subcutaneous, q24h  [START ON 6/15/2024] pantoprazole, 40 mg, oral, Daily before breakfast   Or  [START ON 6/15/2024] pantoprazole, 40 mg, intravenous, Daily before breakfast      Continuous medications  dilTIAZem, 10 mg/hr      PRN medications  PRN medications: acetaminophen **OR** acetaminophen **OR** acetaminophen, melatonin, ondansetron **OR** ondansetron, polyethylene glycol     US scrotum w doppler    Result Date: 6/14/2024  Interpreted By:  Julieta Callahan, STUDY: US SCROTUM WITH DOPPLER; 6/14/2024 4:37 pm   INDICATION: Swelling, pain, and erythema.   COMPARISON: None.   ACCESSION NUMBER(S): [id number]   ORDERING CLINICIAN: QUE GUZMAN   TECHNIQUE: Gray scale and color doppler imaging of the scrotum was performed with spectral waveform analysis. Arterial inflow and venous outflow documented. Duplex Doppler interrogation including color flow and spectral waveform analysis is performed.   FINDINGS: Right testis: 3.4 x 2.0 x 3.2 cm.   Left testis: 4.9 x 2.9 x 3.0 cm.   Epididymides: The epididymides are normal in size and echogenicity. There is a 2 x 3 x 3 mm cyst within right epididymal head.   The testes appear homogeneous with no intratesticular " lesions. Ectasia of the rete testes on the right is present. Duplex Doppler interrogation of each testicle including color flow and spectral waveform analysis shows normal arterial and venous flow without torsion or orchitis.   There is a 3.6 x 6.0 x 3.0 encapsulated fluid collection anterior and superior to the right testicle containing low-level internal echoes and debris. This does not arise from the right epididymis. This may represent a scrotal wall abscess. There appears to be diffuse thickening of the scrotal wall.       3.0 x 3.6 x 6.0 cm encapsulated fluid collection noted which is extratesticular in location and is seen anterior and superior to the right testicle. This contains low-level internal echoes and some thickening of its wall with internal debris. This may represent a scrotal abscess. Clinical correlation is necessary.   Ectasia of the rete testes on the right.   3 mm cyst right epididymal head.   No epididymal orchitis.   MACRO: None.   Signed by: Julieta Callahan 6/14/2024 4:59 PM Dictation workstation:   [id number]    XR chest 2 views    Result Date: 6/14/2024  Interpreted By:  Josh Soto, STUDY: XR CHEST 2 VIEWS;  6/14/2024 3:10 pm   INDICATION: Signs/Symptoms:Cough.   COMPARISON: 05/28/2024   ACCESSION NUMBER(S): [id number]   ORDERING CLINICIAN: WES ORTIZ   FINDINGS: Pacemaker leads are intact and properly positioned.       CARDIOMEDIASTINAL SILHOUETTE: Cardiomediastinal silhouette is normal in size and configuration.   LUNGS: There is complete opacification left hemithorax with volume loss and shift of the mediastinum to the left.   Since the prior examination further increase in density of the right lung base which may suggest atelectasis or infiltrate. Correlation with auscultation and inflammatory markers recommended. A small to moderate on right pleural effusion persists.   ABDOMEN: No remarkable upper abdominal findings.   BONES: No acute osseous changes.       1.  Complete  opacification with volume loss left hemithorax. Increased density right lung base reflecting small to moderate right pleural effusion. Superimposed pneumonia can not be excluded.       MACRO: None   Signed by: Josh Soto 6/14/2024 3:36 PM Dictation workstation:   [id number]    CT abdomen pelvis wo IV contrast    Result Date: 6/14/2024  Interpreted By:  Schoenberger, Joseph, STUDY: CT ABDOMEN PELVIS WO IV CONTRAST;  6/14/2024 2:55 pm   INDICATION: Signs/Symptoms:Scrotal swelling, erythema, erythema extending up into the abdomen.   COMPARISON: 05/22/2024   ACCESSION NUMBER(S): [id number]   ORDERING CLINICIAN: WES ORTIZ   TECHNIQUE: CT of the abdomen and pelvis was performed. Contiguous axial images were obtained at 3 mm slice thickness through the abdomen and pelvis. Coronal and sagittal reconstructions at 3 mm slice thickness were performed.  No intravenous or oral contrast agents were administered.   FINDINGS: Please note that the evaluation of vessels, lymph nodes and organs is limited without intravenous contrast.   LOWER CHEST: There is some persistent volume loss in the left lower lobe with persistent consolidation. There are few air bronchograms. There is rounded collection of fluid at the anterior aspect of this process which may relate to either necrotizing pneumonia or loculated pleural fluid or conceivably pulmonary abscess. However this is also unchanged from the prior. There is a however, a new moderate dependent layering right pleural effusion.   ABDOMEN:   LIVER: 2 cysts located in the superior aspect the left liver lobe are stable from the prior. No other focal abnormality.   BILE DUCTS: No dilation   GALLBLADDER: Dependent layering calcified gallstones. No wall thickening or pericholecystic fluid.   PANCREAS: Unremarkable   SPLEEN: Unremarkable   ADRENAL GLANDS: Bilateral adrenal glands appear normal.   KIDNEYS AND URETERS: The kidneys are normal in size and unremarkable in appearance.  No  hydroureteronephrosis or nephroureterolithiasis is identified. 5 mm nonobstructing lower pole right renal stone unchanged.   PELVIS:   BLADDER: High choose 1   REPRODUCTIVE ORGANS: Unremarkable   BOWEL: The stomach is unremarkable.  Small bowel loops are normal in caliber without mural thickening. Colon is normal in caliber. There is a mobile cecum. There are multiple colonic diverticula. There is no convincing evidence for acute diverticulitis. Normal appendix.   VESSELS: There is no aneurysmal dilatation of the abdominal aorta. The IVC appears normal.   PERITONEUM/RETROPERITONEUM/LYMPH NODES: There is no free or loculated fluid collection, no free intraperitoneal air. The retroperitoneum appears normal.  No abdominopelvic lymphadenopathy is present. The   ABDOMINAL WALL: There is soft tissues Amanda in the abdominal wall which is new from the prior. This is a set metric Roseann more pronounced on the left than on the right. This extends into the lower abdominal left-sided pannus. It does not appear to overtly involve the scrotum. There may be loculated right-sided hydrocele.   BONES: No suspicious osseous lesions are identified. 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       1.  The high significant development of abdominal wall edema in the subcutaneous tissues asymmetrically worse on the left. This does not appear to especially extend into the scrotum. 2. There is a new moderate right pleural effusion. 3. Persistent left basal consolidation with anterior oval shaped collection of fluid with similar appearance to prior other than lack of air bronchograms on this exam compared to the prior. Associated volume loss.     MACRO: None   Signed by: Joseph Schoenberger 6/14/2024 3:30 PM Dictation workstation:   [id number]    ECG 12 lead    Result Date: 6/14/2024  Accelerated Junctional rhythm Low voltage QRS Nonspecific ST abnormality Abnormal ECG When compared with ECG of 30-MAY-2024  08:33, Junctional rhythm has replaced Atrial flutter See ED provider note for full interpretation and clinical correlation    US renal complete    Result Date: 6/8/2024  Interpreted By:  Ryan Best and Weaver Jakob STUDY: US RENAL COMPLETE;  6/6/2024 5:51 pm   INDICATION: Signs/Symptoms:VIK.   COMPARISON: None.   ACCESSION NUMBER(S): [id number]   ORDERING CLINICIAN: ADRYAN SIMON   TECHNIQUE: Multiple images of the kidneys were obtained.   FINDINGS: RIGHT KIDNEY: The right kidney measures 11.8 cm in length. The renal cortical echogenicity is increased. Renal cortical thickness is mildly decreased. No hydronephrosis is present; no evidence of nephrolithiasis.   LEFT KIDNEY: The left kidney is poorly visualized, measuring approximately 11.8 cm. Renal cortical echogenicity is increased. No apparent hydronephrosis or nephrolithiasis.   BLADDER: The urinary bladder is unremarkable in appearance. Poorly visualized small volume free fluid in the left upper quadrant.       Examination is somewhat limited due to patient body habitus, positioning. Within this limitation: Increased renal cortical echogenicity bilaterally which may relate to medical renal disease. No hydronephrosis or nephrolithiasis.   I personally reviewed the images/study and I agree with the findings as stated. This study was interpreted at [facility], [place], [place].   MACRO: None   Signed by: Ryan Urena 6/8/2024 3:02 AM Dictation workstation:   [id number]    ECG 12 Lead    Result Date: 5/31/2024  Atrial flutter with variable AV block with premature ventricular or aberrantly conducted complexes Low voltage QRS Abnormal ECG When compared with ECG of 28-MAY-2024 01:40, Atrial flutter has replaced Atrial fibrillation Nonspecific T wave abnormality, improved in Inferior leads Confirmed by Jovanny Loera ([id number]) on 5/31/2024 1:51:33 PM    Flexible Sigmoidoscopy    Result Date:  5/31/2024  Table formatting from the original result was not included. Impression Dark red blood visualized in the rectum. Blood was cleared without any active bleeding appreciated nor underlying mucosal changes. Few small and large, scattered diverticula with no inflammation in the sigmoid colon; no bleeding was identified. Diverticula were irrigated without any bleeding or stigmata of recent bleed appreciated. All observed locations appeared normal, including the sigmoid colon, rectosigmoid and rectum. No mucosal abnormalities. Normal on retroflexion. Green bilious stool proximal to the rectum, most notably in the proximal descending colon and transverse colon, without any blood, blood clots or hematin. Findings Dark red blood visualized in the rectum. Blood was cleared without any active bleeding appreciated nor underlying mucosal changes. Few small and large, scattered diverticula with no inflammation in the sigmoid colon; no bleeding was identified. Diverticula were irrigated without any bleeding or stigmata of recent bleed appreciated. All observed locations appeared normal, including the sigmoid colon, rectosigmoid and rectum. No mucosal abnormalities. Normal on retroflexion. Green bilious stool proximal to the rectum, most notably in the proximal descending colon and transverse colon, without any blood, blood clots or hematin. Recommendation  Follow up with primary gastroenterologist  Follow up with inpatient GI consult service, Dr. Valenzuela and Dr. Archibald Continue on IV PPI BID  Indication Iron deficiency anemia due to chronic blood loss Staff Staff Role Kaylen Valenzuela MD Proceduralist Ale Archibald MD Medications micafungin (Mycamine) 100 mg in dextrose 5% 100 mL IV 95.24 mg*  *From user-documented volume fentaNYL PF (Sublimaze) injection  - Omnicell Override Pull Cannot be calculated*  *Administration dose not documented midazolam (Versed) injection  - Omnicell Override Pull Cannot be calculated*   *Administration dose not documented (Totals for administrations occurring from 1228 to 1404 on 05/24/24) Preprocedure A history and physical has been performed, and patient medication allergies have been reviewed. The patient's tolerance of previous anesthesia has been reviewed. The risks and benefits of the procedure and the sedation options and risks were discussed with the patient. All questions were answered and informed consent obtained. Details of the Procedure The patient underwent no sedation. The patient's blood pressure, ECG, heart rate, level of consciousness, oxygen and respirations were monitored throughout the procedure. A digital rectal exam was performed. A perianal exam was performed. The scope was introduced through the anus and advanced to the transverse colon. Retroflexion was performed in the rectum. The quality of bowel preparation was evaluated using the Pollock Bowel Preparation Scale with scores of: left colon = 3. Bowel prep was not adequate. The patient experienced no blood loss. The procedure was not difficult. The patient tolerated the procedure well. There were no apparent adverse events. Events Procedure Events Event Event Time ENDO SCOPE IN TIME 5/24/2024  1:08 PM ENDO SCOPE OUT TIME 5/24/2024  1:26 PM Specimens No specimens collected Procedure Location [facility] [address] [facility] [address] [phone] Referring Provider Grayson Nichols, APRN- [address] [address] Procedure Provider Ale Archibald MD     Lower extremity venous duplex left    Result Date: 5/31/2024  Interpreted By:  Nathan Argueta and Weaver Jakob STUDY: [facility] US LOWER EXTREMITY VENOUS DUPLEX LEFT;  5/30/2024 3:58 pm   INDICATION: Signs/Symptoms:LLE swelling, eval for DVT.   COMPARISON: None.   ACCESSION NUMBER(S): [id number]   ORDERING CLINICIAN: ALEXANDRA AVILA   TECHNIQUE: Vascular ultrasound of the left lower  extremity was performed. Real-time compression views as well as Gray scale, color Doppler and spectral Doppler waveform analysis was performed.   FINDINGS: Evaluation of the visualized portions of the left common femoral vein, proximal, mid, and distal femoral vein, and popliteal vein were performed.  Evaluation of the visualized portions of the  posterior tibial and peroneal veins were also performed. Evaluation of the calf veins limited due to edema.   Echogenic intraluminal material in the proximal greater saphenous vein with partial compressibility and color Doppler signal The evaluated veins demonstrate normal compressibility. There is intact venous flow demonstrating normal respiratory variability and normal augmentation of flow with calf compression. Therefore, there is no ultrasonographic evidence for deep vein thrombosis within the evaluated veins.       1.  No sonographic evidence for deep vein thrombosis within the evaluated veins of the left lower extremity. 2. Nonocclusive thrombus of the proximal greater saphenous vein, a superficial vein. Recommend correlation with concern for superficial thrombophlebitis.   I personally reviewed the images/study and I agree with the findings as stated by Agustín Cheatham MD. This study was interpreted at [place], [place].   MACRO: None   Signed by: Nathan Argueta 5/31/2024 5:32 AM Dictation workstation:   [id number]    ECG 12 Lead    Result Date: 5/30/2024  Atrial flutter with variable AV block Rightward axis Low voltage QRS Nonspecific ST and T wave abnormality Abnormal ECG When compared with ECG of 16-MAY-2024 21:13, Significant changes have occurred Confirmed by Jovanny Loera ([id number]) on 5/30/2024 11:03:21 AM    US chest    Result Date: 5/29/2024  Interpreted By:  Dinh Plunkett, STUDY: US CHEST; 5/29/20243:53 pm   INDICATION: Signs/Symptoms:Thoracentesis (Left) diagnostic/therapeutic.   COMPARISON: None.   ACCESSION  NUMBER(S): [id number]   ORDERING CLINICIAN: ALEXANDRA AVILA   TECHNIQUE: INTERVENTIONALIST(S): Dinh Plunkett   CONSENT: The patient/patient's POA/next of kin was informed of the nature of the proposed procedure. The purposes, alternatives, risks, and benefits were explained and discussed. All questions were answered and consent was obtained.   SEDATION: None   TIME OUT: A time out was performed immediately prior to procedure start with the interventional team, correctly identifying the patient name, date of birth, MRN, procedure, anatomy (including marking of site and side), patient position, procedure consent form, relevant laboratory and imaging test results, antibiotic administration, safety precautions, and procedure-specific equipment needs.   FINDINGS: The patient was placed in the supine position.   The thoracic space was examined with grey scale ultrasound, and an absence of free fluid was demonstrated on ultrasound. Thoracic images were captured to demonstrate. A thoracentesis was not performed.       Absence of free fluid for procedure. A thoracentesis was not performed.   I personally performed and/or directly supervised this study and was present for the entire procedure.   Performed and dictated at [facility].   Signed by: Dinh Plunkett 5/29/2024 3:53 PM Dictation workstation:   [id number]    XR chest 1 view    Result Date: 5/28/2024  Interpreted By:  Jovany Nuñez, STUDY: XR CHEST 1 VIEW;  5/28/2024 8:03 am   INDICATION: Signs/Symptoms:Rule out infection.   COMPARISON: Chest radiograph dated 5/27/2024   ACCESSION NUMBER(S): [id number]   ORDERING CLINICIAN: ALEXANDRA AVILA   FINDINGS: AP radiograph of the chest   Pacemaker electrodes appear in adequate position. There is virtually complete opacification of the left thorax. The heart mediastinum are shifted to the left indicating volume loss. Compensatory hyperaeration of the right lung and pulmonary vascular  shunting to the right lung is noted increased from previous study.         1. Virtually complete opacification of the left thorax with heart and mediastinum shifted to the left indicating volume loss 2. Compensatory pulmonary vascular shunting to the right lung       Signed by: Jovany Nuñez 5/28/2024 10:38 AM Dictation workstation:   [id number]    Electrocardiogram, 12-lead PRN ACS symptoms    Result Date: 5/28/2024  Atrial fibrillation with rapid ventricular response Nonspecific ST and T wave abnormality , probably digitalis effect Abnormal ECG When compared with ECG of 28-MAY-2024 01:39, (unconfirmed) No significant change was found Confirmed by Jovanny Loera ([id number]) on 5/28/2024 10:17:48 AM    FL modified barium swallow study    Result Date: 5/26/2024  Interpreted By:  Sohail Torres,  and Xavi Jerez STUDY: FL MODIFIED BARIUM SWALLOW STUDY;; 5/25/2024 9:06 am   INDICATION: Signs/Symptoms:r\o dysphagia.   COMPARISON: None.   ACCESSION NUMBER(S): [id number]   ORDERING CLINICIAN: ALEXANDRA AVILA   TECHNIQUE: MBSS completed. Informed verbal consent obtained prior to completion of exam. Trials of thin, nectar thick,  puree, and regular solids given. Fluoroscopy time :  2 minutes.   SLP: Meri Hurley MS CCC/SLP Phone/Pager: Epic Chat   SPEECH FINDINGS: Reason for referral: Further assessment of oropharyngeal swallow Patient hx: Recent RLL PNA. S/s of aspiration w/3 oz Swallow Protocol Respiratory status: Nasal cannula Previous diet: Reg/thin   FINAL SPEECH RECOMMENDATIONS   Diet recommendations/feeding strategies: Solid Diet Recommendations : Easy to Chew Liquid Diet Recommendations: Thin Medication Administration: Single w/ sips of water, whole in puree as needed   Safe Swallow Strategies/Guidelines: Only present PO intake when awake and alert Supervision/assist w/ PO intake to assure adherence to safe swallow strategies Upright positioning Slow rate/small boluses   Follow-up speech therapy recommended: Yes.    Short term goals: Pt will tolerate least restrictive diet with adequate oral phase and no s/s of aspiration. Date initiated: 5/25/24 Status: Goal initiated   Pt will adhere to safe swallow strategies during PO intake with > 90% accuracy independently. Date initiated: 5/25/24 Status: Goal initiated   Education provided: Yes.     Mechanics of the swallow summary: *Oral phase: Mild deficits.   Adequate bolus acceptance.  Prolonged mastication and bolus formation/transit w/ regular solids.  No oral residue.   *Pharyngeal phase: Mild deficits.   Incomplete epiglottic inversion, may be related to presence of NGT. Timely swallow initiation.  Adequate hyolaryngeal elevation/excursion.  Trace penetration x1 with consecutive boluses of thin liquids related to incomplete airway protection.  Ejected upon swallow completion.  No other penetration and no aspiration with any other consistency assessed.  Mild residue at the valleculae following the swallow.  Largely cleared with liquid wash.  Mildly reduced distention of PES.     SLP impressions with severity rating: Mild oropharyngeal dysphagia characterized by prolonged mastication/bolus formation and mild residue at the valleculae following the swallow.   Thin Liquids (MBSS) Rosenbek's Penetration Aspiration Scale, Thin Liquids (MBSS): 2. PENETRATION that CLEARS - contrast enter airway, above vocal cords, no residue     Nectar Thick Liquids (MBSS) Rosenbek's Penetration Aspiration Scale, Nectar thick liquids (MBSS): 1. NO ASPIRATION & NO PENETRATION - no aspiration, contrast does not enter airway       Purees (MBSS) Rosenbek's Penetration Aspiration Scale, Purees (MBSS): 1. NO ASPIRATION & NO PENETRATION - no aspiration, contrast does not enter airway     Solids (MBSS) Rosenbek's Penetration Aspiration Scale, Solids (MBSS): 1. NO ASPIRATION & NO PENETRATION - no aspiration, contrast does not enter airway     Speech Therapy section of this report signed by Meri Hurley MS CCC/SLP  on 5/25/2024 at 10:17 am.   RADIOLOGY FINDINGS: Nasoenteric tube is partially visualized. No evidence of acute osseous abnormality of the cervical spine. Patient is edentulous.   Radiology section of this report signed by Dr. Torres.       1. No radiographic evidence of acute process in the neck. 2. Please refer to speech pathology report for additional findings.   MACRO: None   Signed by: Sohail Torres 5/26/2024 3:51 PM Dictation workstation:   [id number]    XR abdomen 1 view    Result Date: 5/25/2024  Interpreted By:  Marcie Shah, STUDY: XR ABDOMEN 1 VIEW; 5/25/2024 5:45 pm   INDICATION: Signs/Symptoms:ngt placed.   COMPARISON: Radiograph dated 05/25/2024, 2:33 p.m.   ACCESSION NUMBER(S): [id number]   ORDERING CLINICIAN: ALEXANDRA AVILA   FINDINGS: Single-view of the abdomen. The pelvis is not included. Enteric tube is in place with the tip projecting over the stomach. Positive contrast is identified in the gastric fundus. Prominent loops of bowel throughout the visualized upper abdomen. Nonobstructive bowel gas pattern.  Limited evaluation of pneumoperitoneum on supine imaging, however no gross evidence of free air is noted.   Extensive consolidation in visualized portion of the left lung.   Osseous structures demonstrate no acute bony changes.       1.  Enteric tube projecting over the proximal stomach. 2. Again seen multiple prominent loops of bowel throughout the upper abdomen. Correlation with ileus. Recommend follow-up radiograph.   Signed by: Marcie Rodriguez 5/25/2024 5:54 PM Dictation workstation:   [id number]    XR abdomen 1 view    Result Date: 5/25/2024  Interpreted By:  Marcie Shah, STUDY: XR ABDOMEN 1 VIEW; 5/25/2024 3:06 pm   INDICATION: Signs/Symptoms:distended abdomen, assess for dilated bowel loops.   COMPARISON: Radiograph dated 05/21/2024   ACCESSION NUMBER(S): [id number]   ORDERING CLINICIAN: LAEXANDRA AVILA   FINDINGS: Views of the abdomen and pelvis. What  is presumed to be enteric tube is projecting over the lower mediastinum. Positive contrast is identified in the expected location of the stomach. There is also positive contrast throughout the loops of bowel in the lower abdomen. Prominent loops of colon. Limited evaluation of pneumoperitoneum on supine imaging, however no gross evidence of free air is noted.   Consolidative opacities in left lung.   Osseous structures demonstrate no acute bony changes.       1.  Prominent loops of colon which can be due to ileus. Recommend follow-up radiograph.   Signed by: Marcie Rodriguez 5/25/2024 5:33 PM Dictation workstation:   [id number]    XR chest 1 view    Result Date: 5/23/2024  Interpreted By:  Jovany Nuñez and Baker Zachary STUDY: XR CHEST 1 VIEW; ;  5/22/2024 6:45 pm   INDICATION: Signs/Symptoms:Worsening tachypnea, confusion.   COMPARISON: Chest radiograph on 05/21/2024.   ACCESSION NUMBER(S): [id number]   ORDERING CLINICIAN: SUN CHAMPAGNE   FINDINGS: Single AP view of the chest.   Right subclavian vein approach pacemaker/AICD in place with leads projecting over the right atrium and ventricle. Enteric tube coursing below diaphragm tip overlying the expected position of the gastric body.   The cardiomediastinal silhouette is obscured, similar to prior exam.   Persistent near-complete opacification of the left hemithorax, with mild interval increase in air bronchograms within the left upper lung zone. Redemonstration of patchy right infrahilar airspace opacities and interstitial prominence on the right. No right-sided pleural effusion or pneumothorax.   No acute osseous abnormality.       1. Persistent near-complete opacification of the left hemithorax, with mild interval increase in air bronchograms within the left upper lung zone. 2. Persistent patchy right infrahilar airspace opacities and interstitial prominence throughout the right lung, which may represent aspiration/pneumonia in the appropriate clinical  setting. 3. Medical devices as above.   I personally reviewed the images/study and I agree with the findings as stated by Dr. Raymond Armstrong M.D. This study was interpreted at [facility], [place], [place].   MACRO: None   Signed by: Jovany Nuñez 5/23/2024 7:32 AM Dictation workstation:   [id number]    CT chest abdomen pelvis wo IV contrast    Result Date: 5/23/2024  Interpreted By:  Sohail Torres,  and Jeff Ibrahim STUDY: CT CHEST ABDOMEN PELVIS WO CONTRAST;  5/22/2024 12:15 pm   INDICATION: Signs/Symptoms:c/f sepsis.   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 N/V/D with coffe grounds concernign for UPI GIB and ongoing fevers.   COMPARISON: CT chest/abdomen/pelvis 05/18/2024   ACCESSION NUMBER(S): [id number]   ORDERING CLINICIAN: SUN CHAMPAGNE   TECHNIQUE: CT of the chest, abdomen and pelvis was performed. Contiguous axial images were obtained at 3 mm slice thickness through the chest, abdomen and pelvis. Coronal and sagittal reconstructions at 3 mm slice thickness were performed.  No intravenous contrast was administered; positive oral contrast was given.   FINDINGS: Please note that the study is limited without intravenous contrast.   Respiratory motion artifact.   CHEST:   LUNG/PLEURA/LARGE AIRWAYS: Trachea and bilateral mainstem bronchi are patent.   Again seen complete opacification of the left hemithorax with air bronchograms consistent with significant collapse of the left lung.   There is left pleural thickening (example series 2, image 38) with a focus of left posterior chest wall extension at the level of the 9th-10th (series 2, image 71) and 10th-11th (series 2, image 82) rib spaces, similar to prior, findings consistent with patient's known mesothelioma.   Similar small loculated pleural effusion measuring 7.1 x 4.1 x 2.3 cm (series 2, image  66 and series 900, image 62).   Interval improvement of right lower lobe ill-defined patchy infiltrate (series 4, image 143). Slight interval increase in right trace pleural effusion. No new focal consolidations. No evidence of pneumothorax.   VESSELS: The previously noted ill-defined hypodensity in the left pulmonary artery is not well evaluated on this exam due to beam hardening artifact, positioning of patient's arms, and lack of venous contrast.   Dilated main pulmonary artery measuring 3.8 cm (series 2, image 40), similar to prior. Ectatic ascending aorta measuring 4.1 cm, similar to prior.  Mild to moderate coronary artery calcifications are present.   HEART: The heart is normal in size.  Trace pericardial fluid, similar to prior. Right-sided subclavian approach dual-chambercardiac ICD/pacemaker, with the leads in the right atrium and right ventricle.   MEDIASTINUM AND ANDREA: Multiple prominent mediastinal and perihilar lymph nodes similar to prior. For example:   1.3 cm subcarinal lymph node (series 2, image 48).   1 cm perihilar lymph node (series 2, image 33).   Enteric tube courses through the esophagus and terminates in the body of the stomach. Otherwise otherwise esophagus is within normal limits.   CHEST WALL AND LOWER NECK: Right chest wall cardiac pacemaker as described above. The visualized thyroid gland appears within normal limits.   ABDOMEN:   LIVER: The liver is normal in size. Similar hypoattenuating lesions with the largest measuring 2 cm segment 4A (series 2, image 71).   BILE DUCTS: The bile ducts are not dilated.   GALLBLADDER: The gallbladder is not distended. Calcified stones are noted.   PANCREAS: The pancreas appears unremarkable.   SPLEEN: Within normal limits.   ADRENAL GLANDS: Bilateral adrenal glands appear normal.   KIDNEYS AND URETERS: Bilateral mildly atrophic kidneys with mild perinephric stranding similar to prior. There is a 0.6 cm left midpole nonobstructing renal calculi. No  hydroureteronephrosis. No right nephroureterolithiasis.   PELVIS:   BLADDER: The urinary bladder is underdistended with Forrest catheter in place. There is air in the urinary bladder likely from the Forrest.   REPRODUCTIVE ORGANS: No pelvic masses.   BOWEL: NG tube body of the stomach. Otherwise, the stomach is unremarkable. The small is normal in caliber and demonstrate no wall thickening. Proximal sigmoid diverticulosis with interval improvement in the mild wall thickening and adjacent fat stranding involving the proximal sigmoid colon (series 902, image 93). Interval insertion of rectal tube.   VESSELS: Mild atherosclerosis of the abdominal aorta and its branching vessels. There is no aneurysmal dilatation of the abdominal aorta..   Interval flattening of the inferior vena cava (image 2, image 121)/   PERITONEUM/RETROPERITONEUM/LYMPH NODES: No ascites or free air, no fluid collection.  The retroperitoneum appears unremarkable.  No enlarged mesenteric lymph nodes.   ABDOMINAL WALL: Within normal limits.   BONES: No suspicious osseous lesions are present. Degenerative discogenic disease is noted in the lower thoracic and lumbar spine most severe at L3-L4, L4-L5, and L5-L6..       Chest 1. Interval improvement of the right lower lobe patchy infiltrate. 2. Again seen left lung collapse, left pleural thickening with invasion into the left posterior chest wall, and small left loculated pleural effusion consistent with patient's known mesothelioma and is unchanged when compared to prior CT from 05/18/2024. 3. Unchanged prominent mediastinal and perihilar lymph nodes. 4. Previously noted left pulmonary artery thrombus is not well evaluated on this exam due to beam hardening artifact, positioning of patient's arms, and lack of intravenous contrast.   Abdomen-Pelvis 1. Interval flattening of the inferior vena cava which may be seen with hypovolemia. Recommend correlation with patient's fluid volume status. 2. Improved proximal  sigmoid diverticulitis. 3. Additional unchanged findings as noted above.   I personally reviewed the images/study and I agree with the findings as stated by Patrice Aguilar DO, PGY-2. This study was interpreted at [place], [place].   MACRO: None   Signed by: Sohail Torres 5/23/2024 12:22 AM Dictation workstation:   [id number]    Transthoracic Echo (TTE) Complete    Result Date: 5/21/2024   [facility], [address] [place] [address]                Tel [phone] and Fax [phone] TRANSTHORACIC ECHOCARDIOGRAM REPORT  Patient Name:      ZAKIYAJITENDRA LAWSON       Reading Physician:    [id number] Miller MD Study Date:        5/21/2024            Ordering Provider:    [id number] CHAMPAGNE MRN/PID:           [id number]             Fellow: Accession#:        [id number]         Nurse: Date of Birth/Age: 1935 / 88 years Sonographer:          Dionisio Braga RDCS Gender:            M                    Additional Staff: Height:            172.72 cm            Admit Date: Weight:            109.77 kg            Admission Status:     Inpatient -                                                               Routine BSA / BMI:         2.22 m2 / 36.80      Encounter#:           [id number]                    kg/m2                                         Department Location:  [place] [address] Blood Pressure: 96 /57 mmHg Study Type:    TRANSTHORACIC ECHO (TTE) COMPLETE Diagnosis/ICD: Unspecified atrial fibrillation-I48.91; Hypertensive heart                disease with heart failure-I11.0 Indication:    hypoxic resp failure w/ new o2 requirement c/f HF CPT Code:      Echo Complete w Full Doppler-[id number] Patient History: Pertinent History: PAD, afib/flutter, hx DVT on  warfarin, HTN, CAD s/p stent,                    mitral regurg, HLD,JOVANNI, basal cell ca on face, s/p removal                    and radiation of malignant mesothelioma, S/p L VATS with                    decort 5/2022. Study Detail: The following Echo studies were performed: 2D, M-Mode, color flow               and Doppler. Technically challenging study due to body habitus,               patient lying in supine position, poor acoustic windows and               prominent lung artifact. Definity used as a contrast agent for               endocardial border definition. Total contrast used for this               procedure was 2.0 mL via IV push. Unable to obtain suprasternal               notch view.  PHYSICIAN INTERPRETATION: Left Ventricle: The left ventricular systolic function is hyperdynamic, with an estimated ejection fraction of 70-75%. The patient is in atrial fibrillation which may influence the estimate of left ventricular function and transvalvular flows. There are no regional wall motion abnormalities. The left ventricular cavity size is normal. Left ventricular diastolic filling was not assessed. Left Atrium: The left atrium was not well visualized. Right Ventricle: The right ventricle was not well visualized. Unable to determine right ventricular systolic function. Right Atrium: The right atrium was not well visualized. Aortic Valve: The aortic valve is trileaflet. There is trivial aortic valve regurgitation. The peak instantaneous gradient of the aortic valve is 13.2 mmHg. Mitral Valve: The mitral valve is normal in structure. There is mild mitral annular calcification. There is trace mitral valve regurgitation. Tricuspid Valve: The tricuspid valve was not well visualized. Tricuspid regurgitation was not well visualized. Tricuspid regurgitation was not assessed. The Doppler estimated RVSP is mildly elevated at 36.6 mmHg. Pulmonic Valve: The pulmonic valve is not well visualized. There is physiologic  pulmonic valve regurgitation. Pericardium: There is a trivial pericardial effusion. Aorta: The aortic root is abnormal. The aortic root appears moderately dilated and measures 4.50 cm. The Asc Ao is 3.70 cm. There is mild dilatation of the ascending aorta. Systemic Veins: The inferior vena cava appears to be of normal size. There is IVC inspiratory collapse greater than 50%. In comparison to the previous echocardiogram(s): Compared with study from 5/3/2022, the previous study was a MARY during a MARY/DCCV and comparison is limited.  CONCLUSIONS:  1. Poorly visualized anatomical structures due to suboptimal image quality.  2. Left ventricular systolic function is hyperdynamic with a 70-75% estimated ejection fraction.  3. The patient is in atrial fibrillation which may influence the estimate of left ventricular function and transvalvular flows.  4. Mildly elevated RVSP.  5. Moderately dilated aortic root. QUANTITATIVE DATA SUMMARY: 2D MEASUREMENTS:                          Normal Ranges: Ao Root d:     4.50 cm   (2.0-3.7cm) LAs:           2.50 cm   (2.7-4.0cm) IVSd:          0.80 cm   (0.6-1.1cm) LVPWd:         0.80 cm   (0.6-1.1cm) LVIDd:         4.10 cm   (3.9-5.9cm) LVIDs:         2.20 cm LV Mass Index: 43.9 g/m2 LV % FS        46.3 % AORTA MEASUREMENTS:                    Normal Ranges: Asc Ao, d: 3.70 cm (2.1-3.4cm) LV SYSTOLIC FUNCTION BY 2D PLANIMETRY (MOD):                     Normal Ranges: EF-A4C View: 73.6 % (>=55%) EF-A2C View: 80.4 % EF-Biplane:  78.8 % LV DIASTOLIC FUNCTION:                     Normal Ranges: MV Peak E: 1.09 m/s (0.7-1.2 m/s) MV DT:     245 msec (150-240 msec) MITRAL VALVE:                 Normal Ranges: MV DT: 245 msec (150-240msec) AORTIC VALVE:                          Normal Ranges: AoV Vmax:      1.82 m/s  (<=1.7m/s) AoV Peak P.2 mmHg (<20mmHg) LVOT Max Philipp:  1.42 m/s  (<=1.1m/s) LVOT VTI:      18.80 cm LVOT Diameter: 2.00 cm   (1.8-2.4cm) AoV Area,Vmax: 2.45 cm2  (2.5-4.5cm2)   RIGHT VENTRICLE: TAPSE: 15.8 mm RV s'  0.18 m/s TRICUSPID VALVE/RVSP:                             Normal Ranges: Peak TR Velocity: 2.90 m/s RV Syst Pressure: 36.6 mmHg (< 30mmHg) PULMONIC VALVE:                      Normal Ranges: PV Max Philipp: 0.9 m/s  (0.6-0.9m/s) PV Max PG:  3.2 mmHg  [id number] Delvis Miller MD Electronically signed on 5/21/2024 at 5:46:15 PM  ** Final **     XR abdomen 1 view    Result Date: 5/21/2024  Interpreted By:  Jovany Nuñez, STUDY: XR ABDOMEN 1 VIEW;  5/21/2024 12:58 pm   INDICATION: Signs/Symptoms:post ngt.   COMPARISON: One-view abdomen 05/21/2024 13 a.m.   ACCESSION NUMBER(S): [id number]   ORDERING CLINICIAN: SUN CHAMPAGNE   FINDINGS: One-view abdomen   An enteric tube is positioned within the region of the gastric fundus several cm below the expected gastroesophageal junction. There is a predominantly fluid-filled small bowel and colon pattern. There is less gastric distention with air.   Opacification of the left thorax and multiple air bronchograms are noted.       1.  The enteric tube is positioned within the gastric fundus 2. Advancement of the tube would be recommended for more optimal positioning 3. Predominantly fluid-filled small bowel and colon pattern 4. Opacification of the visualized left thorax and multiple air bronchograms within the left lung   MACRO: None   Signed by: Jovany Nuñez 5/21/2024 2:19 PM Dictation workstation:   [id number]    XR abdomen 1 view    Result Date: 5/21/2024  Interpreted By:  Jovany Nuñez, STUDY: XR ABDOMEN 1 VIEW;  5/21/2024 11:13 am   INDICATION: Signs/Symptoms:ABD DISTENTION.   COMPARISON: None.   ACCESSION NUMBER(S): [id number]   ORDERING CLINICIAN: SUN CHAMPAGNE   FINDINGS: The stomach is distended with air. There is a predominantly fluid-filled small bowel and colon pattern. Gas is demonstrated within the colon and distal ileum. The left ventricular pacemaker electrode appears in adequate position. The left ventricle appears mildly  enlarged. Osseous and articular anatomy is normal for age.       1.  The stomach is moderately distended with air otherwise, nonspecific predominantly fluid-filled bowel pattern   MACRO: None   Signed by: Jovany Nuñez 5/21/2024 2:17 PM Dictation workstation:   [id number]    XR chest 1 view    Result Date: 5/21/2024  Interpreted By:  Jovany Nuñez and Calo Sean-Matthew STUDY: XR CHEST 1 VIEW;  5/21/2024 6:14 am   INDICATION: Signs/Symptoms:SOB.   COMPARISON: Chest radiograph 05/16/2024 CT chest 05/18/2024   ACCESSION NUMBER(S): [id number]   ORDERING CLINICIAN: SUN CHAMPAGNE   FINDINGS: AP radiograph of the chest was provided.   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   CARDIOMEDIASTINAL SILHOUETTE: Cardiomediastinal silhouette is stable in size with complete obscuration of the left cardiomediastinal border.   LUNGS: Persistent near-complete opacification of the left hemithorax with interval reduced air bronchograms compared to prior. Persistent patchy right infrahilar airspace opacities are noted. Prominent perihilar and interstitial lung markings are noted on the right. No consolidation, effusion, or pneumothorax on the right.   ABDOMEN: No remarkable upper abdominal findings.   BONES: No acute osseous changes.       1. Virtually complete opacification of the left hemithorax with interval reduced air bronchograms. 2. Persistent patchy right infrahilar airspace opacities which may reflect aspiration changes and/or pneumonia in the appropriate clinical setting.   I personally reviewed the images/study and I agree with the findings as stated by Matthew Lindo MD. This study was interpreted at [facility], [place], [place].   MACRO: None   Signed by: Jovany Nuñez 5/21/2024 11:26 AM Dictation workstation:   [id number]    XR abdomen 1 view    Result Date: 5/20/2024  Interpreted By:  Brandyn  Sofya, STUDY: XR ABDOMEN 1 VIEW;  5/19/2024 10:42 pm   INDICATION: Signs/Symptoms:abdominal distension.   COMPARISON: CT: 05/18/2024   ACCESSION NUMBER(S): [id number]   ORDERING CLINICIAN: SUN CHAMPAGNE   FINDINGS: Nonobstructive bowel gas pattern. Limited evaluation of pneumoperitoneum on supine imaging, however no gross evidence of free air is noted.   Whiteout throughout the left hemithorax.   Osseous structures demonstrate no acute bony changes.       1.  Nonspecific and nonobstructive gas pattern.   MACRO: None   Signed by: Sofya Ahumada 5/20/2024 8:47 AM Dictation workstation:   [id number]    ECG 12 lead    Result Date: 5/18/2024  Sinus tachycardia with 1st degree AV block Lateral infarct , age undetermined Inferior infarct (cited on or before 10-DEC-2023) Prolonged QT Abnormal ECG When compared with ECG of 10-DEC-2023 11:23, Significant changes have occurred See ED provider note for full interpretation and clinical correlation Confirmed by Renee Dotson ([id number]) on 5/18/2024 11:02:23 AM    CT chest abdomen pelvis w IV contrast    Result Date: 5/18/2024  Interpreted By:  Collins Conde, STUDY: CT CHEST ABDOMEN PELVIS W IV CONTRAST;  5/18/2024 8:38 am   INDICATION: Signs/Symptoms:abd pain, LLQ TTP.   COMPARISON: Chest CT scan 12/10/2020. Chest and abdomen CT scan dated 06/14/2023.   ACCESSION NUMBER(S): [id number]   ORDERING CLINICIAN: THEA MURRAY   TECHNIQUE: CT of the chest, abdomen, and pelvis was performed.  Contiguous axial images were obtained at 3 mm slice thickness through the chest, abdomen and pelvis. Coronal and sagittal reconstructions at 3 mm slice thickness were performed. 75 ml of contrast Omnipaque were administered intravenously without immediate complication.   FINDINGS: CHEST:   LUNG/PLEURA/LARGE AIRWAYS: Significant collapse of the left lung with heterogenous appearance, pleural thickening and small loculated pleural fluid measuring 7.8 cm consistent with the patient's known  mesothelioma. Focus of left posterior chest wall extension (series 201, image 77 and image 89) at the level of the 9th 10th and 10th 11th rib spaces   Right lower lobe ill-defined patchy infiltrate measuring 2.3 x 1.4 cm. Trace right-sided pleural effusion with surrounding atelectasis.   Redemonstrated left hilar ill-defined enhancing fullness appears extending to the left pulmonary trunk possibly tumor thrombus and felt less likely bland thrombus.   VESSELS: The aforementioned left hilar ill-defined soft tissue fullness appears extending to the left pulmonary trunk with almost 50% occlusion. Mild coronary artery calcifications   HEART: The heart is normal in size.  Trace pericardial effusion. Right chest wall pacemaker noted.   MEDIASTINUM AND ANDREA: Multiple prominent mediastinal lymph nodes in the largest in the subcarinal region measuring 1.6 cm. The esophagus is normal.   CHEST WALL AND LOWER NECK: The soft tissues of the chest wall demonstrate no gross abnormality. The visualized thyroid gland appears within normal limits.   ABDOMEN:   LIVER: Normal size. Normal contour. Stable hypoattenuating lesions likely benign in the largest segment 4A measuring 2 cm.   BILE DUCTS: The intrahepatic and extrahepatic ducts are not dilated.   GALLBLADDER: Cholelithiasis.   PANCREAS: The pancreas appears unremarkable without evidence of ductal dilatation or masses.   SPLEEN: The spleen is normal in size.   ADRENAL GLANDS: Bilateral adrenal glands appear normal.   KIDNEYS AND URETERS: The kidneys are normal in size and enhance symmetrically.  No hydroureteronephrosis. Nonobstructive left midpole 0.6 cm calculus.   PELVIS:   BLADDER: Underdistended with Forrest catheter in place.   REPRODUCTIVE ORGANS: No pelvic masses.   BOWEL: The stomach, small and large bowel are normal in appearance without wall thickening or obstruction. Proximal sigmoid diverticulosis with mild wall thickening could be related to episodes of underlying mild  diverticulitis. No ascites. Pneumoperitoneum.     VESSELS: Mild vascular calcifications and atherosclerotic changes.   PERITONEUM/RETROPERITONEUM/LYMPH NODES: No enlarged intra abdominopelvic lymphadenopathy.   BONE AND SOFT TISSUE: Multilevel degenerative spine changes and facet joint disease. Trace soft tissue thickening at the level of the umbilicus.       CHEST: 1. Significant collapse of the left lung with heterogenous appearance, pleural thickening and small loculated pleural fluid measuring 7.8 cm consistent with the patient's known mesothelioma which have worsened from the prior CT scan dated 06/14/2023 and grossly unchanged from 12/10/2023 unenhanced CT scan allowing for differences in techniques. Focus of new left posterior chest wall invasion noted at the level of 9th 10th and 10th 11th rib spaces. 2. Right lower lobe ill-defined patchy infiltrate measuring 2.3 x 1.4 cm. Trace right-sided pleural effusion with surrounding atelectasis. 3. Redemonstrated left hilar ill-defined enhancing fullness appears extending to the left pulmonary trunk likely tumor thrombus and felt less likely bland thrombus which has significantly worsened from 06/14/2023 CT scan. 4. Mildly enlarged multiple mediastinal lymph nodes in the largest in the subcarinal region measuring 1.6 cm, grossly unchanged from prior.   ABDOMEN-PELVIS: 1. Proximal sigmoid diverticulosis with mild wall thickening could be related to episodes of underlying mild uncomplicated diverticulitis 2. Other ancillary findings are unchanged.   The significant results of the study were relayed by me to and acknowledged by Janes Nichols NP on 05/18/2024 at 10:40 a.m. over the phone.   MACRO: None   Signed by: Collins Conde 5/18/2024 10:47 AM Dictation workstation:   [id number]    US renal complete    Result Date: 5/17/2024  Interpreted By:  Karel Lowry, STUDY: US RENAL COMPLETE;  5/17/2024 8:02 pm   INDICATION: Signs/Symptoms:Urinary retention?.    COMPARISON: CT abdomen pelvis 05/16/2024   ACCESSION NUMBER(S): [id number]   ORDERING CLINICIAN: KEMI AUGUSTINE   TECHNIQUE: Grayscale and color Doppler sonographic images of the kidneys.   FINDINGS:     RIGHT KIDNEY: The right kidney measures 11.3 cm in length. Diffuse renal cortical thinning. No hydronephrosis. No renal calculus. No perinephric fluid.   LEFT KIDNEY: The left kidney measures 11.2 cm in length. Diffuse renal cortical thinning. No hydronephrosis. There is a 1 cm nonobstructing left renal calculus. No perinephric fluid.   URINARY BLADDER: Urinary bladder is decompressed, limiting its evaluation, with Forrest catheter in place.       1. Diffuse bilateral renal cortical thinning in keeping with chronic renal disease. No hydronephrosis. 2. Nonobstructing 1 cm left renal calculus. 3. Decompressed urinary bladder due to Forrest catheter, limiting its evaluation.   MACRO: None.   Signed by: Karel Lowry 5/17/2024 10:07 PM Dictation workstation:   [id number]    CT abdomen pelvis w IV contrast    Result Date: 5/17/2024  STUDY: CT Abdomen and Pelvis with IV Contrast; 05/16/2024 10:27 pm INDICATION: Abdominal pain.  Distension. COMPARISON: CT CAP 06/14/2023 and 03/21/2023. ACCESSION NUMBER(S): [id number] ORDERING CLINICIAN: KAILA HALEY TECHNIQUE: CT of the abdomen and pelvis was performed.  Contiguous axial images were obtained at 3 mm slice thickness through the abdomen and pelvis. Coronal and sagittal reconstructions at 3 mm slice thickness were performed.  Omnipaque 350, 75 mL was administered intravenously.  FINDINGS: LOWER CHEST: Cardiac size is enlarged with pacer leads in the right atrium and right ventricle.  Moderate calcified coronary plaque is noted.  Mild calcified plaque is seen in the included descending thoracic aorta. Thoracic aorta is not aneurysmal.  There is complete collapse and consolidation of the left lung at the left lung base with loculated effusion seen at the left lung base.   Increased AP diameter of the thorax suggests chronic changes of COPD.  Respiratory motion limits assessment in the lung bases and upper abdomen.  Small sliding-type hiatal hernia is noted.  ABDOMEN:  LIVER: Simple fluid density cysts are noted in the dome of the liver along with subcentimeter hypodensities in the liver which are too small to definitively characterize but statistically most likely represent simple cysts or hemangiomas.  No hepatomegaly.   BILE DUCTS: No intrahepatic or extrahepatic biliary ductal dilatation.  GALLBLADDER: Gallbladder contains gallstones in the gallbladder neck but is otherwise unremarkable.  STOMACH: No abnormalities identified.  PANCREAS: No masses or ductal dilatation.  SPLEEN: No splenomegaly or focal splenic lesion.  ADRENAL GLANDS: No thickening or nodules.  KIDNEYS AND URETERS: Kidneys are normal in location.  Moderate bilateral renal cortical thinning is seen with mild bilateral perinephric stranding. Nonobstructing 6 mm calculus is seen in the mid left kidney.  No ureteral calculi.  PELVIS:  BLADDER: Urinary bladder is decompressed by Forrest catheter, limiting assessment.   REPRODUCTIVE ORGANS: No abnormalities identified.  BOWEL: Diverticulosis is noted throughout the descending colon and sigmoid colon.  There is subtle fat stranding adjacent to the proximal sigmoid colon in the left upper pelvis which may represent mild acute diverticulitis.  Appendix is not definitively identified.  Terminal ileum is unremarkable.   VESSELS: Mild calcified atheromatous plaque is seen in the abdominal aorta and iliac arteries.  No aneurysm.  PERITONEUM/RETROPERITONEUM/LYMPH NODES: No free fluid.  No pneumoperitoneum. No lymphadenopathy.  ABDOMINAL WALL: No abnormalities identified. SOFT TISSUES: No abnormalities identified.  BONES: No acute fracture or aggressive osseous lesion.  Generalized osseous demineralization is noted.  There is a mild levoconvex curvature of the lumbar spine centered  at L4.  Moderate multilevel disc disease is seen in the spine.  There is moderate bilateral mid and lower lumbar facet arthrosis.    1.  Subtle inflammatory change along the proximal sigmoid colon which may represent a low-grade acute diverticulitis.  No definite perforation or abscess. 2.  Complete collapse and consolidation of the left lung with a loculated effusion at the left lung base, unchanged compared to the prior chest CT and likely consistent with patient's reported mesothelioma, possibly chronic post treatment change. 3.  Cardiomegaly with chronic changes suggesting COPD. 4.  Cholelithiasis. 5.  Moderate bilateral renal atrophy with nonobstructing 6 mm left renal calculus. 6.  Additional chronic changes as described. Signed by Lucas Owen    XR chest 1 view    Result Date: 5/16/2024  STUDY: Chest Radiograph;  5/16/2024 9:40 PM INDICATION: Weakness. COMPARISON: 12/10/2023 XR Chest. ACCESSION NUMBER(S): [id number] ORDERING CLINICIAN: KAILA HALEY TECHNIQUE:  Frontal chest was obtained at 21:40 hours. FINDINGS: CARDIOMEDIASTINAL SILHOUETTE: Cardiomediastinal silhouette is normal in size and configuration.  LUNGS: Complete opacification left hemithorax present.  This appears chronic likely related to volume loss and chronic consolidation.  This appears similar prior exams.  ABDOMEN: No remarkable upper abdominal findings.  BONES: No acute osseous changes.    Complete opacification left hemithorax likely related to volume loss and chronic consolidation. Signed by Andrade Angel MD       Results for orders placed or performed during the hospital encounter of 06/14/24 (from the past 24 hour(s))   ECG 12 lead   Result Value Ref Range    Ventricular Rate 126 BPM    Atrial Rate 129 BPM    QRS Duration 78 ms    QT Interval 324 ms    QTC Calculation(Bazett) 469 ms    R Axis 81 degrees    T Axis 42 degrees    QRS Count 20 beats    Q Onset 229 ms    T Offset 391 ms    QTC Fredericia 415 ms   CBC and Auto  Differential   Result Value Ref Range    WBC 4.3 (L) 4.4 - 11.3 x10*3/uL    nRBC 0.0 0.0 - 0.0 /100 WBCs    RBC 2.93 (L) 4.50 - 5.90 x10*6/uL    Hemoglobin 8.6 (L) 13.5 - 17.5 g/dL    Hematocrit 28.0 (L) 41.0 - 52.0 %    MCV 96 80 - 100 fL    MCH 29.4 26.0 - 34.0 pg    MCHC 30.7 (L) 32.0 - 36.0 g/dL    RDW 16.2 (H) 11.5 - 14.5 %    Platelets 159 150 - 450 x10*3/uL    Neutrophils % 73.8 40.0 - 80.0 %    Immature Granulocytes %, Automated 1.9 (H) 0.0 - 0.9 %    Lymphocytes % 7.0 13.0 - 44.0 %    Monocytes % 11.5 2.0 - 10.0 %    Eosinophils % 5.6 0.0 - 6.0 %    Basophils % 0.2 0.0 - 2.0 %    Neutrophils Absolute 3.14 1.60 - 5.50 x10*3/uL    Immature Granulocytes Absolute, Automated 0.08 0.00 - 0.50 x10*3/uL    Lymphocytes Absolute 0.30 (L) 0.80 - 3.00 x10*3/uL    Monocytes Absolute 0.49 0.05 - 0.80 x10*3/uL    Eosinophils Absolute 0.24 0.00 - 0.40 x10*3/uL    Basophils Absolute 0.01 0.00 - 0.10 x10*3/uL   Comprehensive metabolic panel   Result Value Ref Range    Glucose 104 (H) 74 - 99 mg/dL    Sodium 140 136 - 145 mmol/L    Potassium 5.1 3.5 - 5.3 mmol/L    Chloride 107 98 - 107 mmol/L    Bicarbonate 26 21 - 32 mmol/L    Anion Gap 12 10 - 20 mmol/L    Urea Nitrogen 51 (H) 6 - 23 mg/dL    Creatinine 3.50 (H) 0.50 - 1.30 mg/dL    eGFR 16 (L) >60 mL/min/1.73m*2    Calcium 8.5 (L) 8.6 - 10.3 mg/dL    Albumin 3.0 (L) 3.4 - 5.0 g/dL    Alkaline Phosphatase 60 33 - 136 U/L    Total Protein 6.2 (L) 6.4 - 8.2 g/dL    AST 31 9 - 39 U/L    Bilirubin, Total 0.3 0.0 - 1.2 mg/dL    ALT 14 10 - 52 U/L   Lipase   Result Value Ref Range    Lipase 26 9 - 82 U/L   Lactate   Result Value Ref Range    Lactate 1.3 0.4 - 2.0 mmol/L   C-Reactive Protein   Result Value Ref Range    C-Reactive Protein 10.32 (H) <1.00 mg/dL   Sedimentation Rate   Result Value Ref Range    Sedimentation Rate 38 (H) 0 - 20 mm/h   Light Blue Top   Result Value Ref Range    Extra Tube Hold for add-ons.    Lavender Top   Result Value Ref Range    Extra Tube Hold for  add-ons.             Assessment/Plan   Principal Problem:    Cellulitis of scrotum  Active Problems:    Atrial fibrillation with RVR (Multi)    Benign essential hypertension    Acute on chronic respiratory failure with hypoxia (Multi)    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Multi)    Recurrent pleural effusion    Abdominal wall cellulitis    -Continue with IV antibiotics vancomycin specially with recent history of MRSA.  -Add cefepime.  -Optimize pain control.  -Continue with nasal cannula oxygen, titrate as needed.  -Patient is in A-fib with RVR, will use Cardizem drip for better rate control.  -Monitor serum electrolytes and correct as indicated.  -Pulmonary consult for further recommendation about opacification of the left lung and right pleural effusion.  -Will use Lovenox tonight for anticoagulation.  Will need clarification if the patient is still on warfarin or Eliquis.    -Telemetry monitoring    Vipul Sotelo MD

## 2024-06-15 NOTE — PROGRESS NOTES
Medical Group Progress Note  ASSESSMENT & PLAN:     Scrotal/abdominal wall cellulitis  ?scrotal abscess  -seems to be improving  -fluid collection not clear if hydrocele or abscess; no plans for surgical intervention for right now  -cont with iv antibiotics, seems to be improving  -follow cultures    Afib with RVR  -cont with current treatment, stable on dilt drip  -he is on lovenox currently; was [place] [facility] [place] 2/2 anemia, GI bleeding  -final anticoagulation plan pending goals of care, hemoglobin trend while here    Anemia  -stable, likely multifactorial per last heme note including reduced bone marrow reserve, ongoing chronic and acute illnesses  -transfuse <7    Progressive mesothelioma  -with significant L lung collapse, pleural effusions, hilar mass, and possible tumor thrombus  -prolonged hospitalization at [facility] [place] on [place]ology service; currently no plans for further chemotherapy, limited by his functional reserve and multiple chronic conditions  -had discussion with wife and son at bedside today; pt reports his goals of care are to get home; will see how he progresses while here to determine if we try for SNF and more functional recovery or possibly hospice at home  -add codeine syrup for cough; unfortunately pharmacy does not stock promethazine syrup which has helped in the past for him        VTE Prophylaxis: Lovenox      Deondre Wagner MD    SUBJECTIVE     No overnight events. Abd pain and swelling improved. Mild scrotal wall tenderness. No chest pain or dyspnea. No fever.     OBJECTIVE:     Last Recorded Vitals:  Vitals:    06/15/24 0500 06/15/24 0638 06/15/24 0743 06/15/24 1208   BP:  103/66 90/59 117/80   BP Location:   Right arm Right arm   Patient Position:   Lying Lying   Pulse: 93 (!) 123 107 99   Resp:   18 18   Temp:   36.2 °C (97.2 °F) 36.5 °C (97.7 °F)   TempSrc:   Temporal Temporal   SpO2:   99% 99%   Weight:       Height:         Last I/O:  I/O last 3  completed shifts:  In: 1040 (8.8 mL/kg) [IV Piggyback:1040]  Out: - (0 mL/kg)   Weight: 117.9 kg     Physical Exam  GEN: chronically ill appearing, obese  HEENT: NCAT, PERRLA, EOMI, moist mucous membranes  NECK: supple, no masses  CV: irregularly irregular, no m/r/g, no LE edema  LUNGS: diminished bilaterally, rhonchi,   ABD: soft, NT, ND, NBS  SKIN: L abdominal wall erythema, improving, mild edema  : L scrotal wall cellulitis, mild TTP  MSK; no gross deformities, normal joints; pitting edema bilaterally  NEURO: A+Ox3, no FND  PSYCH: appropriate mood, affect      Inpatient Medications:  cefepime, 1 g, intravenous, q12h  enoxaparin, 1 mg/kg, subcutaneous, q24h  metroNIDAZOLE, 500 mg, intravenous, q8h  pantoprazole, 40 mg, oral, Daily before breakfast   Or  pantoprazole, 40 mg, intravenous, Daily before breakfast  vancomycin, 500 mg, intravenous, q24h    PRN Medications  PRN medications: acetaminophen **OR** acetaminophen **OR** acetaminophen, benzonatate, codeine-guaifenesin, melatonin, ondansetron **OR** ondansetron, polyethylene glycol, vancomycin  Continuous Medications:  dilTIAZem, 5 mg/hr, Last Rate: 5 mg/hr (06/15/24 0901)      LABS AND IMAGING:     Labs:  Results from last 7 days   Lab Units 06/15/24  0537 06/14/24  1523 06/11/24  0651   WBC AUTO x10*3/uL 4.8 4.3* 3.9*   RBC AUTO x10*6/uL 2.79* 2.93* 2.88*   HEMOGLOBIN g/dL 8.2* 8.6* 8.5*   HEMATOCRIT % 27.2* 28.0* 27.2*   MCV fL 98 96 94   MCH pg 29.4 29.4 29.5   MCHC g/dL 30.1* 30.7* 31.3*   RDW % 16.1* 16.2* 16.3*   PLATELETS AUTO x10*3/uL 142* 159 132*     Results from last 7 days   Lab Units 06/15/24  0537 06/14/24  1523 06/11/24  0651   SODIUM mmol/L 139 140 136   POTASSIUM mmol/L 5.0 5.1 5.0   CHLORIDE mmol/L 107 107 105   CO2 mmol/L 23 26 20*   BUN mg/dL 48* 51* 59*   CREATININE mg/dL 3.35* 3.50* 3.58*   GLUCOSE mg/dL 86 104* 86   PROTEIN TOTAL g/dL 5.8* 6.2* 5.7*   CALCIUM mg/dL 8.3* 8.5* 8.2*   BILIRUBIN TOTAL mg/dL 0.4 0.3 0.4   ALK PHOS U/L 59 60  57   AST U/L 28 31 34   ALT U/L 13 14 22     Results from last 7 days   Lab Units 06/11/24  0651 06/10/24  2157 06/10/24  0721   MAGNESIUM mg/dL 2.02 2.18 2.04         Imaging:  ECG 12 lead  Accelerated Junctional rhythm  Low voltage QRS  Nonspecific ST abnormality  Abnormal ECG  When compared with ECG of 30-MAY-2024 08:33,  Junctional rhythm has replaced Atrial flutter    See ED provider note for full interpretation and clinical correlation    Confirmed by Chavo Cid ([id number]) on 6/15/2024 11:11:58 AM

## 2024-06-15 NOTE — CONSULTS
Inpatient consult to Cardiology  Consult performed by: Chavo Cid MD  Consult ordered by: Deondre Wagner MD  Reason for consult: Atrial fibrillation management  Assessment/Recommendations: Patient was seen, chart reviewed.    Discussed case with patient and family members regarding atrial fibrillation.  Patient usually had paroxysm of atrial fibrillation but since admission this time, he has been in atrial fibrillation.  Rates most of the times controlled between  bpm  We will ask for device interrogation today (patient has a dual-chamber pacemaker [employer])  Continue with anticoagulation therapy  Depending on burden of atrial fibrillation we will decide about using antiarrhythmic therapy  Looking at his records he was admitted few weeks ago at [facility].  At that time patient was admitted with supratherapeutic INR.  Warfarin was discontinued and given that INR was continue being elevated.  He required vitamin K.  He also required transfusions of PRBC due to hemoglobin dropped 3 points.  It was not clear at the time of the discharge if patient was cleared to be back on anticoagulation or no.  Even there was a discussion about Eliquis therapy.  Will talk to primary team for resumption of anticoagulation therapy.  Eliquis could be one of the options.  Rest of plan per primary team  Continue telemetry  Risk factor modification and lifestyle modification discussed with patient. Diet , exercise and hydration discussed with patient.    Please excuse any errors in grammar or translation related to this dictation.  Voice recognition software was utilized to prepare this document.          History Of Present Illness:    Vinicius Arriola is a 89 y.o. male presenting with cellulitis.    Patient apparently was admitted for skin rash for 3 days.  He has significant erythematous area involving the abdomen and the lower part of the abdomen extending to the groin area on the back.  Associated  with abdominal wall edema.    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  Chemotherapy is placed on hold.    Past medical history of coronary disease status post remote PTCA, hypertension, hyperlipidemia, polycythemia vera and benign prostatic hypertrophy.  History of sinus node dysfunction with a possible chamber pacemaker implanted in July 2022 and paroxysmal atrial fibrillation with periods of rapid ventricular response controlled on beta-blocker therapy.      ER course: Patient was awake, alert, oriented, in no acute distress but looked ill.  Patient was afebrile but was tachycardic in the 120s.  Blood pressure was stable.  He is on nasal cannula oxygen 3 L.  Labs showed creatinine 3.5 similar to baseline, and there is no leukocytosis.  Lactate was normal.  He has elevated C-reactive protein 10.32.  Ultrasound of the scrotum did show encapsulated fluid collection measuring up to 3 x 3.6 x 6 cm.  This was reviewed by urology and believed to be hydrocele no abscess.  His chest x-ray did show complete opacification of left hemithorax.  Also increased density in the right lung base reflecting small to moderate right pleural effusion.  Superimposed pneumonia cannot be excluded.  EKG on arrival shows atrial fibrillation rate of 126 bpm QRS ration 70 ms QT corrected 469 ms.    Device interrogation was in March 12, 2024 that shows dual-chamber pacemaker with battery longevity 12 years 2 months.  [place] of atrial fibrillation 0.1% of the time.    Echocardiogram May 2024  CONCLUSIONS:   1. Poorly visualized anatomical structures due to suboptimal image quality.   2. Left ventricular systolic function is hyperdynamic with a 70-75% estimated ejection fraction.   3. The patient is in atrial fibrillation which may influence the estimate of left ventricular function and transvalvular flows.   4. Mildly elevated RVSP.   5. Moderately dilated aortic root.       Last Recorded Vitals:  Vitals:     06/15/24 0405 06/15/24 0500 06/15/24 0638 06/15/24 0743   BP: 117/58  103/66 90/59   BP Location:    Right arm   Patient Position:    Lying   Pulse: (!) 119 93 (!) 123 107   Resp:    18   Temp:    36.2 °C (97.2 °F)   TempSrc:    Temporal   SpO2:    99%   Weight:       Height:           Last Labs:  CBC - 6/15/2024:  5:37 AM  4.8 8.2 142    27.2      CMP - 6/15/2024:  5:37 AM  8.3 5.8 28 --- 0.4   4.5 2.9 13 59      PTT - 5/30/2024: 11:16 AM  1.1   12.9 28     Troponin I, High Sensitivity   Date/Time Value Ref Range Status   05/16/2024 10:46 PM 27 (H) 0 - 20 ng/L Final   05/16/2024 09:13 PM 23 (H) 0 - 20 ng/L Final   12/10/2023 05:35 PM 17 0 - 20 ng/L Final     BNP   Date/Time Value Ref Range Status   06/14/2024 09:32  (H) 0 - 99 pg/mL Final   05/19/2024 04:24  (H) 0 - 99 pg/mL Final     Hemoglobin A1C   Date/Time Value Ref Range Status   10/12/2022 12:18 PM 5.7 (A) % Final     Comment:          Diagnosis of Diabetes-Adults   Non-Diabetic: < or = 5.6%   Increased risk for developing diabetes: 5.7-6.4%   Diagnostic of diabetes: > or = 6.5%  .       Monitoring of Diabetes                Age (y)     Therapeutic Goal (%)   Adults:          >18           <7.0   Pediatrics:    13-18           <7.5                   7-12           <8.0                   0- 6            7.5-8.5   American Diabetes Association. Diabetes Care 33(S1), Jan 2010.     09/11/2019 09:35 AM 5.8 % Final     Comment:          Diagnosis of Diabetes-Adults   Non-Diabetic: < or = 5.6%   Increased risk for developing diabetes: 5.7-6.4%   Diagnostic of diabetes: > or = 6.5%  .       Monitoring of Diabetes                Age (y)     Therapeutic Goal (%)   Adults:          >18           <7.0   Pediatrics:    13-18           <7.5                   7-12           <8.0                   0- 6            7.5-8.5   American Diabetes Association. Diabetes Care 33(S1), Jan 2010.       VLDL   Date/Time Value Ref Range Status   10/12/2022 12:23 PM 37 0 -  "40 mg/dL Final   11/05/2021 08:00 AM 23 0 - 40 mg/dL Final   08/26/2021 07:18 AM 32 0 - 40 mg/dL Final      Last I/O:  I/O last 3 completed shifts:  In: 1040 (8.8 mL/kg) [IV Piggyback:1040]  Out: - (0 mL/kg)   Weight: 117.9 kg     Past Cardiology Tests (Last 3 Years):  EKG:  ECG 12 lead 06/14/2024 (Preliminary)      ECG 12 Lead 05/30/2024      Electrocardiogram, 12-lead PRN ACS symptoms 05/28/2024      ECG 12 Lead 05/25/2024      ECG 12 lead 05/16/2024      ECG 12 lead (Clinic Performed) 12/06/2023      EKG 12 lead 11/02/2023    Echo:  Transthoracic Echo (TTE) Complete 05/21/2024    Ejection Fractions:  No results found for: \"EF\"  Cath:  No results found for this or any previous visit from the past 1095 days.    Stress Test:  No results found for this or any previous visit from the past 1095 days.    Cardiac Imaging:  No results found for this or any previous visit from the past 1095 days.      Past Medical History:  He has a past medical history of Acute embolism and thrombosis of unspecified deep veins of unspecified lower extremity (Multi), Dental disease, Encounter for preprocedural cardiovascular examination (11/02/2021), Obesity, unspecified (07/15/2022), Personal history of diseases of the blood and blood-forming organs and certain disorders involving the immune mechanism, Personal history of other diseases of male genital organs, Personal history of other diseases of the circulatory system, Personal history of other diseases of the circulatory system, Personal history of other diseases of the circulatory system (10/21/2021), Personal history of other diseases of the circulatory system, Personal history of other diseases of the nervous system and sense organs, Personal history of other malignant neoplasm of skin, Personal history of other specified conditions, and Personal history of other venous thrombosis and embolism.    Past Surgical History:  He has a past surgical history that includes Other surgical " history (08/20/2019); Other surgical history (08/20/2019); Other surgical history (08/20/2019); Other surgical history (06/10/2022); Other surgical history (11/02/2021); Other surgical history (01/02/2020); Other surgical history (01/02/2020); Other surgical history (05/13/2022); Other surgical history (05/26/2022); Other surgical history (10/21/2021); Other surgical history (11/02/2021); Other surgical history (11/02/2021); Other surgical history (11/02/2021); Other surgical history (11/02/2021); and Other surgical history (11/04/2021).      Social History:  He reports that he quit smoking about 57 years ago. His smoking use included cigarettes. He started smoking about 67 years ago. He has a 10 pack-year smoking history. He has never used smokeless tobacco. He reports current alcohol use of about 4.0 standard drinks of alcohol per week. He reports that he does not use drugs.    Family History:  Family History   Problem Relation Name Age of Onset    Accidental death Mother      Coronary artery disease Mother      Other (Cardiac disorder) Father      Dementia Father      Cancer Father      Colon cancer Daughter          Allergies:  Benadryl allergy decongestant, Diphenhydramine, and Diphenhydramine hcl    Inpatient Medications:  Scheduled medications   Medication Dose Route Frequency    cefepime  1 g intravenous q12h    enoxaparin  1 mg/kg subcutaneous q24h    metroNIDAZOLE  500 mg intravenous q8h    pantoprazole  40 mg oral Daily before breakfast    Or    pantoprazole  40 mg intravenous Daily before breakfast    vancomycin  500 mg intravenous q24h     PRN medications   Medication    acetaminophen    Or    acetaminophen    Or    acetaminophen    benzonatate    melatonin    ondansetron    Or    ondansetron    polyethylene glycol    vancomycin     Continuous Medications   Medication Dose Last Rate    dilTIAZem  5 mg/hr 5 mg/hr (06/15/24 0901)     Outpatient Medications:  Current Outpatient Medications   Medication  Instructions    albuterol 2.5 mg, nebulization, Every 6 hours PRN    ascorbic acid (VITAMIN C) 1,000 mg, oral, Daily    benzonatate (TESSALON) 100 mg, oral, 3 times daily PRN, Do not crush or chew.    carboxymethylcellulose (Refresh Celluvisc) 1 % ophthalmic solution dropperette 1 drop, ophthalmic (eye), Every morning    diclofenac sodium (VOLTAREN) 4 g, Topical, 4 times daily PRN    ferrous sulfate 65 mg, oral, 2 times daily (morning and late afternoon), Do not crush, chew, or split.    melatonin 5 mg, oral, Nightly PRN    metoprolol succinate XL (TOPROL-XL) 150 mg, oral, Daily, Do not crush or chew.    nitroglycerin (Nitrostat) 0.4 mg SL tablet sublingual, Every 5 min PRN    omega-3 fatty acids-fish oil 360-1,200 mg capsule 1 capsule, oral, 2 times daily    ondansetron (ZOFRAN) 4 mg, oral, Every 8 hours PRN    oxygen (O2) gas therapy 1 each, inhalation, Every 12 hours    pantoprazole (PROTONIX) 40 mg, oral, 2 times daily before meals, Do not crush, chew, or split.    promethazine-DM (Phenergan-DM) 6.25-15 mg/5 mL syrup 1.25 mL, oral, 4 times daily PRN    rosuvastatin (Crestor) 10 mg tablet 1 tablet, oral, Daily    tamsulosin (FLOMAX) 0.4 mg, oral, Daily    traZODone (DESYREL) 100 mg, oral, Nightly    ZINC ORAL 1 tablet, oral, Daily       Physical Exam:  Constitutional:       Appearance: He is obese. He is ill-appearing. He is not diaphoretic.   HENT:      Head: Normocephalic and atraumatic.      Nose: Nose normal.      Mouth/Throat:      Mouth: Mucous membranes are dry.   Eyes:      General: No scleral icterus.     Extraocular Movements: Extraocular movements intact.      Conjunctiva/sclera: Conjunctivae normal.      Pupils: Pupils are equal, round, and reactive to light.   Neck:      Vascular: No carotid bruit.   Cardiovascular:      Rate and Rhythm: Tachycardia present. Rhythm irregular.      Heart sounds: No murmur heard.  Pulmonary:      Breath sounds: Wheezing, rhonchi and rales present.      Comments:  Diminished air entry at the bases bilaterally with crackles.  Abdominal:      General: There is distension.      Tenderness: There is no abdominal tenderness. There is no right CVA tenderness, left CVA tenderness or guarding.      Comments: Patient does have abdominal wall edema with evidence of cellulitis involving the left side of the abdominal wall, small area on the right side, small area on the back and also the groin area.   Musculoskeletal:         General: No swelling, tenderness, deformity or signs of injury. Normal range of motion.      Cervical back: Normal range of motion and neck supple. No rigidity or tenderness.      Right lower leg: Edema present.      Left lower leg: Edema present.   Skin:     General: Skin is warm and dry.      Findings: Erythema and rash present.   Neurological:      General: No focal deficit present.      Mental Status: He is alert and oriented to person, place, and time. Mental status is at baseline.   Psychiatric:         Mood and Affect: Mood normal.      Assessment/Plan   Clinical impressions:  1. Sinus node dysfunction with twelve-lead EKG today revealing marked sinus bradycardia with a first-degree AV block on low-dose beta-blockade.  2. Paroxysmal atrial fibrillation with rapid ventricular response controlled on metoprolol and anticoagulated with warfarin.  3. Traumatic left pneumothorax per hospitalization dated April 29, 2022 status post VATS procedure, partial decortication, mechanical pleurodesis, and biopsy for secondary spontaneous pneumothorax.  4. Coronary artery disease status post remote PTCA.  5. Dyslipidemia on statin.  6. Hypertension, controlled   7. Morbid obesity with a BMI of 37.1.  8. Polycythemia vera followed by Dr. Gamboa.  9. Benign prostatic hypertrophy.  10. 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 Thu Ann. Status post 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 treatments.  11. Temporary spinal stimulator pending placement of a  permanent stimulator at [facility] on May 8, 2023.  12.  Skin rash/cellulitis of the abdominal area    Peripheral IV 06/14/24 20 G Left Antecubital (Active)   Site Assessment Clean;Dry;Intact 06/15/24 0900   Dressing Status Clean;Dry;Occlusive 06/15/24 0900   Number of days: 1       Peripheral IV 06/14/24 20 G Right Antecubital (Active)   Site Assessment Clean;Dry;Intact 06/15/24 0900   Dressing Status Clean;Dry;Occlusive 06/15/24 0900   Number of days: 1       Code Status:  DNR and No Intubation    I spent 60 minutes in the professional and overall care of this patient.        Chavo Cid MD

## 2024-06-15 NOTE — CONSULTS
Vancomycin Dosing by Pharmacy- INITIAL    Vinicius Arriola is a 89 y.o. year old male who Pharmacy has been consulted for vancomycin dosing for cellulitis, skin and soft tissue. Based on the patient's indication and renal status this patient will be dosed based on a goal AUC of 400-600.     Renal function is currently stable.    Visit Vitals  /65 (BP Location: Right arm, Patient Position: Lying)   Pulse (!) 125   Temp 36.1 °C (97 °F) (Temporal)   Resp 17        Lab Results   Component Value Date    CREATININE 3.50 (H) 2024    CREATININE 3.58 (H) 2024    CREATININE 3.76 (H) 06/10/2024    CREATININE 3.69 (H) 06/10/2024        Patient weight is as follows:   Vitals:    24 1408   Weight: 118 kg (260 lb)       Cultures:  No results found for the encounter in last 14 days.        No intake/output data recorded.  I/O during current shift:  I/O this shift:  In: 540 [IV Piggyback:540]  Out: -     Temp (24hrs), Av.1 °C (97 °F), Min:36.1 °C (97 °F), Max:36.1 °C (97 °F)         Assessment/Plan     Patient has already been given a loading dose of 2000 mg.  Will initiate vancomycin maintenance,  500 mg every 24 hours.    This dosing regimen is predicted by [employer] to result in the following pharmacokinetic parameters:  Regimen: 500 mg IV every 24 hours.  Start time: 05:52 on 06/15/2024  Exposure target: AUC24 (range)400-600 mg/L.hr   AUC24,ss: 389 mg/L.hr  Probability of AUC24 > 400: 47 %  Ctrough,ss: 14.3 mg/L  Probability of Ctrough,ss > 20: 19 %  Probability of nephrotoxicity (Lodise PRAFUL ): 9 %    Follow-up level will be ordered on 6/15 at midnight and 6/15 at AM labs, unless clinically indicated sooner.  Will continue to monitor renal function daily while on vancomycin and order serum creatinine at least every 48 hours if not already ordered.  Follow for continued vancomycin needs, clinical response, and signs/symptoms of toxicity.       Carol Gonzalez, PharmD

## 2024-06-15 NOTE — PROGRESS NOTES
06/15/24 1301   Discharge Planning   Living Arrangements Spouse/significant other   Support Systems Spouse/significant other;Children   Assistance Needed dependent   Type of Residence Skilled nursing facility   Home or Post Acute Services Post acute facilities (Rehab/SNF/etc)   Type of Post Acute Facility Services Skilled nursing   Patient expects to be discharged to: SNF vs palliative/ hospice   Does the patient need discharge transport arranged? Yes   RoundTrip coordination needed? Yes   Financial Resource Strain   How hard is it for you to pay for the very basics like food, housing, medical care, and heating? Not very   Housing Stability   In the last 12 months, was there a time when you were not able to pay the mortgage or rent on time? N   In the last 12 months, was there a time when you did not have a steady place to sleep or slept in a shelter (including now)? N   Transportation Needs   In the past 12 months, has lack of transportation kept you from medical appointments or from getting medications? no   In the past 12 months, has lack of transportation kept you from meetings, work, or from getting things needed for daily living? No   Patient Choice   Patient / Family choosing to utilize agency / facility established prior to hospitalization Yes     Chart reviewed, writer spoke with patient/ spouse and son- introduced self and explained role. Patient sitting up in bed. He is trying to sleep but continues to cough. Attending is aware and will be ordering something for his cough. Patient had a recent long hospitalization x3 weeks at [facility]. He was just discharged to [facility] was there 3 days and came into the hospital. Attending asked this writer to speak with patient  and family to review options and had general questions about hospice. Writer spoke with patient and son reviewed hospice options and criteria. Also discussed quality of life vs benefits of continued treatment including  therapy. Son would like to consider returning to SNF for therapy to see if he would improve and get stronger. They will discuss amongst themselves and see how he does in the next couple of days. Emotional support provided. Care Transition team to follow and assist as needed. DIONNE Garibay

## 2024-06-15 NOTE — CONSULTS
Reason For Consult  Respiratory evaluation, abnormal CT of the chest that showed left sided complete opacification and right-sided mild to moderate effusion.    History Of Present Illness  Vinicius Arriola is a 89 y.o. male my office patient followed for mesothelioma and COPD.  Presenting with bilateral lower extremity scrotum and abdominal wall swelling and redness and pain for last 3 days duration.     Past Medical History  He has a past medical history of Acute embolism and thrombosis of unspecified deep veins of unspecified lower extremity (Multi), Dental disease, Encounter for preprocedural cardiovascular examination (11/02/2021), Obesity, unspecified (07/15/2022), Personal history of diseases of the blood and blood-forming organs and certain disorders involving the immune mechanism, Personal history of other diseases of male genital organs, Personal history of other diseases of the circulatory system, Personal history of other diseases of the circulatory system, Personal history of other diseases of the circulatory system (10/21/2021), Personal history of other diseases of the circulatory system, Personal history of other diseases of the nervous system and sense organs, Personal history of other malignant neoplasm of skin, Personal history of other specified conditions, and Personal history of other venous thrombosis and embolism.    Surgical History  He has a past surgical history that includes Other surgical history (08/20/2019); Other surgical history (08/20/2019); Other surgical history (08/20/2019); Other surgical history (06/10/2022); Other surgical history (11/02/2021); Other surgical history (01/02/2020); Other surgical history (01/02/2020); Other surgical history (05/13/2022); Other surgical history (05/26/2022); Other surgical history (10/21/2021); Other surgical history (11/02/2021); Other surgical history (11/02/2021); Other surgical history (11/02/2021); Other surgical history (11/02/2021); and  Other surgical history (11/04/2021).     Social History  He reports that he quit smoking about 57 years ago. His smoking use included cigarettes. He started smoking about 67 years ago. He has a 10 pack-year smoking history. He has never used smokeless tobacco. He reports current alcohol use of about 4.0 standard drinks of alcohol per week. He reports that he does not use drugs.    Family History  Family History   Problem Relation Name Age of Onset    Accidental death Mother      Coronary artery disease Mother      Other (Cardiac disorder) Father      Dementia Father      Cancer Father      Colon cancer Daughter          Allergies  Benadryl allergy decongestant, Diphenhydramine, and Diphenhydramine hcl    Review of Systems  Review of Systems   Constitutional:  Positive for appetite change, fatigue and unexpected weight change.   HENT:  Positive for congestion.    Eyes: Negative.    Respiratory:  Positive for cough, shortness of breath and wheezing.    Cardiovascular:  Positive for palpitations and leg swelling.   Gastrointestinal:  Positive for abdominal distention and abdominal pain.   Endocrine: Negative.    Genitourinary: Negative.    Musculoskeletal:  Positive for arthralgias and myalgias.   Skin:  Positive for rash.   Allergic/Immunologic: Negative.    Neurological:  Positive for weakness.   Hematological: Negative.    Psychiatric/Behavioral:  Positive for sleep disturbance. The patient is nervous/anxious.    All other systems reviewed and are negative.     Physical Exam  Physical Exam  Vitals reviewed.   Constitutional:       Appearance: He is obese. He is ill-appearing.   HENT:      Head: Normocephalic.      Nose: Congestion present.      Mouth/Throat:      Mouth: Mucous membranes are dry.   Eyes:      Conjunctiva/sclera: Conjunctivae normal.   Cardiovascular:      Rate and Rhythm: Tachycardia present.      Pulses: Normal pulses.      Heart sounds: Normal heart sounds.   Pulmonary:      Breath sounds: Wheezing  "and rales present.   Abdominal:      General: Bowel sounds are normal. There is distension.      Tenderness: There is abdominal tenderness. There is rebound.   Musculoskeletal:         General: Swelling and tenderness present. Normal range of motion.      Cervical back: Normal range of motion.      Right lower leg: Edema present.      Left lower leg: Edema present.   Skin:     Capillary Refill: Capillary refill takes 2 to 3 seconds.      Findings: Erythema and rash present.   Neurological:      General: No focal deficit present.      Mental Status: He is alert and oriented to person, place, and time.   Psychiatric:         Mood and Affect: Mood normal.         Behavior: Behavior normal.         Thought Content: Thought content normal.         Judgment: Judgment normal.           Last Recorded Vitals  Blood pressure 101/63, pulse (!) 126, temperature 36.1 °C (97 °F), temperature source Temporal, resp. rate 17, height 1.727 m (5' 8\"), weight 118 kg (260 lb), SpO2 97%.    Relevant Results  Results for orders placed or performed during the hospital encounter of 06/14/24 (from the past 96 hour(s))   ECG 12 lead   Result Value Ref Range    Ventricular Rate 126 BPM    Atrial Rate 129 BPM    QRS Duration 78 ms    QT Interval 324 ms    QTC Calculation(Bazett) 469 ms    R Axis 81 degrees    T Axis 42 degrees    QRS Count 20 beats    Q Onset 229 ms    T Offset 391 ms    QTC Fredericia 415 ms   CBC and Auto Differential   Result Value Ref Range    WBC 4.3 (L) 4.4 - 11.3 x10*3/uL    nRBC 0.0 0.0 - 0.0 /100 WBCs    RBC 2.93 (L) 4.50 - 5.90 x10*6/uL    Hemoglobin 8.6 (L) 13.5 - 17.5 g/dL    Hematocrit 28.0 (L) 41.0 - 52.0 %    MCV 96 80 - 100 fL    MCH 29.4 26.0 - 34.0 pg    MCHC 30.7 (L) 32.0 - 36.0 g/dL    RDW 16.2 (H) 11.5 - 14.5 %    Platelets 159 150 - 450 x10*3/uL    Neutrophils % 73.8 40.0 - 80.0 %    Immature Granulocytes %, Automated 1.9 (H) 0.0 - 0.9 %    Lymphocytes % 7.0 13.0 - 44.0 %    Monocytes % 11.5 2.0 - 10.0 % "    Eosinophils % 5.6 0.0 - 6.0 %    Basophils % 0.2 0.0 - 2.0 %    Neutrophils Absolute 3.14 1.60 - 5.50 x10*3/uL    Immature Granulocytes Absolute, Automated 0.08 0.00 - 0.50 x10*3/uL    Lymphocytes Absolute 0.30 (L) 0.80 - 3.00 x10*3/uL    Monocytes Absolute 0.49 0.05 - 0.80 x10*3/uL    Eosinophils Absolute 0.24 0.00 - 0.40 x10*3/uL    Basophils Absolute 0.01 0.00 - 0.10 x10*3/uL   Comprehensive metabolic panel   Result Value Ref Range    Glucose 104 (H) 74 - 99 mg/dL    Sodium 140 136 - 145 mmol/L    Potassium 5.1 3.5 - 5.3 mmol/L    Chloride 107 98 - 107 mmol/L    Bicarbonate 26 21 - 32 mmol/L    Anion Gap 12 10 - 20 mmol/L    Urea Nitrogen 51 (H) 6 - 23 mg/dL    Creatinine 3.50 (H) 0.50 - 1.30 mg/dL    eGFR 16 (L) >60 mL/min/1.73m*2    Calcium 8.5 (L) 8.6 - 10.3 mg/dL    Albumin 3.0 (L) 3.4 - 5.0 g/dL    Alkaline Phosphatase 60 33 - 136 U/L    Total Protein 6.2 (L) 6.4 - 8.2 g/dL    AST 31 9 - 39 U/L    Bilirubin, Total 0.3 0.0 - 1.2 mg/dL    ALT 14 10 - 52 U/L   Lipase   Result Value Ref Range    Lipase 26 9 - 82 U/L   Lactate   Result Value Ref Range    Lactate 1.3 0.4 - 2.0 mmol/L   C-Reactive Protein   Result Value Ref Range    C-Reactive Protein 10.32 (H) <1.00 mg/dL   Sedimentation Rate   Result Value Ref Range    Sedimentation Rate 38 (H) 0 - 20 mm/h   Light Blue Top   Result Value Ref Range    Extra Tube Hold for add-ons.    Lavender Top   Result Value Ref Range    Extra Tube Hold for add-ons.    B-Type Natriuretic Peptide   Result Value Ref Range     (H) 0 - 99 pg/mL    US scrotum w doppler    Result Date: 6/14/2024  Interpreted By:  Julieta Callahan, STUDY: US SCROTUM WITH DOPPLER; 6/14/2024 4:37 pm   INDICATION: Swelling, pain, and erythema.   COMPARISON: None.   ACCESSION NUMBER(S): [id number]   ORDERING CLINICIAN: QUE GUZMAN   TECHNIQUE: Gray scale and color doppler imaging of the scrotum was performed with spectral waveform analysis. Arterial inflow and venous outflow documented. Duplex  Doppler interrogation including color flow and spectral waveform analysis is performed.   FINDINGS: Right testis: 3.4 x 2.0 x 3.2 cm.   Left testis: 4.9 x 2.9 x 3.0 cm.   Epididymides: The epididymides are normal in size and echogenicity. There is a 2 x 3 x 3 mm cyst within right epididymal head.   The testes appear homogeneous with no intratesticular lesions. Ectasia of the rete testes on the right is present. Duplex Doppler interrogation of each testicle including color flow and spectral waveform analysis shows normal arterial and venous flow without torsion or orchitis.   There is a 3.6 x 6.0 x 3.0 encapsulated fluid collection anterior and superior to the right testicle containing low-level internal echoes and debris. This does not arise from the right epididymis. This may represent a scrotal wall abscess. There appears to be diffuse thickening of the scrotal wall.       3.0 x 3.6 x 6.0 cm encapsulated fluid collection noted which is extratesticular in location and is seen anterior and superior to the right testicle. This contains low-level internal echoes and some thickening of its wall with internal debris. This may represent a scrotal abscess. Clinical correlation is necessary.   Ectasia of the rete testes on the right.   3 mm cyst right epididymal head.   No epididymal orchitis.   MACRO: None.   Signed by: Julieta Callahan 6/14/2024 4:59 PM Dictation workstation:   [id number]    XR chest 2 views    Result Date: 6/14/2024  Interpreted By:  Josh Soto, STUDY: XR CHEST 2 VIEWS;  6/14/2024 3:10 pm   INDICATION: Signs/Symptoms:Cough.   COMPARISON: 05/28/2024   ACCESSION NUMBER(S): [id number]   ORDERING CLINICIAN: WES ORTIZ   FINDINGS: Pacemaker leads are intact and properly positioned.       CARDIOMEDIASTINAL SILHOUETTE: Cardiomediastinal silhouette is normal in size and configuration.   LUNGS: There is complete opacification left hemithorax with volume loss and shift of the mediastinum to the left.    Since the prior examination further increase in density of the right lung base which may suggest atelectasis or infiltrate. Correlation with auscultation and inflammatory markers recommended. A small to moderate on right pleural effusion persists.   ABDOMEN: No remarkable upper abdominal findings.   BONES: No acute osseous changes.       1.  Complete opacification with volume loss left hemithorax. Increased density right lung base reflecting small to moderate right pleural effusion. Superimposed pneumonia can not be excluded.       MACRO: None   Signed by: Josh Soto 6/14/2024 3:36 PM Dictation workstation:   [id number]    CT abdomen pelvis wo IV contrast    Result Date: 6/14/2024  Interpreted By:  Schoenberger, Joseph, STUDY: CT ABDOMEN PELVIS WO IV CONTRAST;  6/14/2024 2:55 pm   INDICATION: Signs/Symptoms:Scrotal swelling, erythema, erythema extending up into the abdomen.   COMPARISON: 05/22/2024   ACCESSION NUMBER(S): [id number]   ORDERING CLINICIAN: WES ORTIZ   TECHNIQUE: CT of the abdomen and pelvis was performed. Contiguous axial images were obtained at 3 mm slice thickness through the abdomen and pelvis. Coronal and sagittal reconstructions at 3 mm slice thickness were performed.  No intravenous or oral contrast agents were administered.   FINDINGS: Please note that the evaluation of vessels, lymph nodes and organs is limited without intravenous contrast.   LOWER CHEST: There is some persistent volume loss in the left lower lobe with persistent consolidation. There are few air bronchograms. There is rounded collection of fluid at the anterior aspect of this process which may relate to either necrotizing pneumonia or loculated pleural fluid or conceivably pulmonary abscess. However this is also unchanged from the prior. There is a however, a new moderate dependent layering right pleural effusion.   ABDOMEN:   LIVER: 2 cysts located in the superior aspect the left liver lobe are stable from the  prior. No other focal abnormality.   BILE DUCTS: No dilation   GALLBLADDER: Dependent layering calcified gallstones. No wall thickening or pericholecystic fluid.   PANCREAS: Unremarkable   SPLEEN: Unremarkable   ADRENAL GLANDS: Bilateral adrenal glands appear normal.   KIDNEYS AND URETERS: The kidneys are normal in size and unremarkable in appearance.  No hydroureteronephrosis or nephroureterolithiasis is identified. 5 mm nonobstructing lower pole right renal stone unchanged.   PELVIS:   BLADDER: High choose 1   REPRODUCTIVE ORGANS: Unremarkable   BOWEL: The stomach is unremarkable.  Small bowel loops are normal in caliber without mural thickening. Colon is normal in caliber. There is a mobile cecum. There are multiple colonic diverticula. There is no convincing evidence for acute diverticulitis. Normal appendix.   VESSELS: There is no aneurysmal dilatation of the abdominal aorta. The IVC appears normal.   PERITONEUM/RETROPERITONEUM/LYMPH NODES: There is no free or loculated fluid collection, no free intraperitoneal air. The retroperitoneum appears normal.  No abdominopelvic lymphadenopathy is present. The   ABDOMINAL WALL: There is soft tissues Amanda in the abdominal wall which is new from the prior. This is a set metric Roseann more pronounced on the left than on the right. This extends into the lower abdominal left-sided pannus. It does not appear to overtly involve the scrotum. There may be loculated right-sided hydrocele.   BONES: No suspicious osseous lesions are identified. 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       1.  The high significant development of abdominal wall edema in the subcutaneous tissues asymmetrically worse on the left. This does not appear to especially extend into the scrotum. 2. There is a new moderate right pleural effusion. 3. Persistent left basal consolidation with anterior oval shaped collection of fluid with similar appearance to prior other  than lack of air bronchograms on this exam compared to the prior. Associated volume loss.     MACRO: None   Signed by: Joseph Schoenberger 6/14/2024 3:30 PM Dictation workstation:   [id number]    ECG 12 lead    Result Date: 6/14/2024  Accelerated Junctional rhythm Low voltage QRS Nonspecific ST abnormality Abnormal ECG When compared with ECG of 30-MAY-2024 08:33, Junctional rhythm has replaced Atrial flutter See ED provider note for full interpretation and clinical correlation    US renal complete    Result Date: 6/8/2024  Interpreted By:  Ryan Best and Weaver Jakob STUDY: US RENAL COMPLETE;  6/6/2024 5:51 pm   INDICATION: Signs/Symptoms:VIK.   COMPARISON: None.   ACCESSION NUMBER(S): [id number]   ORDERING CLINICIAN: ADRYAN SIMON   TECHNIQUE: Multiple images of the kidneys were obtained.   FINDINGS: RIGHT KIDNEY: The right kidney measures 11.8 cm in length. The renal cortical echogenicity is increased. Renal cortical thickness is mildly decreased. No hydronephrosis is present; no evidence of nephrolithiasis.   LEFT KIDNEY: The left kidney is poorly visualized, measuring approximately 11.8 cm. Renal cortical echogenicity is increased. No apparent hydronephrosis or nephrolithiasis.   BLADDER: The urinary bladder is unremarkable in appearance. Poorly visualized small volume free fluid in the left upper quadrant.       Examination is somewhat limited due to patient body habitus, positioning. Within this limitation: Increased renal cortical echogenicity bilaterally which may relate to medical renal disease. No hydronephrosis or nephrolithiasis.   I personally reviewed the images/study and I agree with the findings as stated. This study was interpreted at [facility], [place], [place].   MACRO: None   Signed by: Ryan Urena 6/8/2024 3:02 AM Dictation workstation:   [id number]    ECG 12 Lead    Result Date: 5/31/2024  Atrial flutter with variable AV  block with premature ventricular or aberrantly conducted complexes Low voltage QRS Abnormal ECG When compared with ECG of 28-MAY-2024 01:40, Atrial flutter has replaced Atrial fibrillation Nonspecific T wave abnormality, improved in Inferior leads Confirmed by Jovanny Loera ([id number]) on 5/31/2024 1:51:33 PM    Flexible Sigmoidoscopy    Result Date: 5/31/2024  Table formatting from the original result was not included. Impression Dark red blood visualized in the rectum. Blood was cleared without any active bleeding appreciated nor underlying mucosal changes. Few small and large, scattered diverticula with no inflammation in the sigmoid colon; no bleeding was identified. Diverticula were irrigated without any bleeding or stigmata of recent bleed appreciated. All observed locations appeared normal, including the sigmoid colon, rectosigmoid and rectum. No mucosal abnormalities. Normal on retroflexion. Green bilious stool proximal to the rectum, most notably in the proximal descending colon and transverse colon, without any blood, blood clots or hematin. Findings Dark red blood visualized in the rectum. Blood was cleared without any active bleeding appreciated nor underlying mucosal changes. Few small and large, scattered diverticula with no inflammation in the sigmoid colon; no bleeding was identified. Diverticula were irrigated without any bleeding or stigmata of recent bleed appreciated. All observed locations appeared normal, including the sigmoid colon, rectosigmoid and rectum. No mucosal abnormalities. Normal on retroflexion. Green bilious stool proximal to the rectum, most notably in the proximal descending colon and transverse colon, without any blood, blood clots or hematin. Recommendation  Follow up with primary gastroenterologist  Follow up with inpatient GI consult service, Dr. Valenzuela and Dr. Archibald Continue on IV PPI BID  Indication Iron deficiency anemia due to chronic blood loss Staff Staff Role Kaylen HAM  Susan Valenzuela MD Proceduralist Ale Archibald MD Medications micafungin (Mycamine) 100 mg in dextrose 5% 100 mL IV 95.24 mg*  *From user-documented volume fentaNYL PF (Sublimaze) injection  - Omnicell Override Pull Cannot be calculated*  *Administration dose not documented midazolam (Versed) injection  - Omnicell Override Pull Cannot be calculated*  *Administration dose not documented (Totals for administrations occurring from 1228 to 1404 on 05/24/24) Preprocedure A history and physical has been performed, and patient medication allergies have been reviewed. The patient's tolerance of previous anesthesia has been reviewed. The risks and benefits of the procedure and the sedation options and risks were discussed with the patient. All questions were answered and informed consent obtained. Details of the Procedure The patient underwent no sedation. The patient's blood pressure, ECG, heart rate, level of consciousness, oxygen and respirations were monitored throughout the procedure. A digital rectal exam was performed. A perianal exam was performed. The scope was introduced through the anus and advanced to the transverse colon. Retroflexion was performed in the rectum. The quality of bowel preparation was evaluated using the [place] Bowel Preparation Scale with scores of: left colon = 3. Bowel prep was not adequate. The patient experienced no blood loss. The procedure was not difficult. The patient tolerated the procedure well. There were no apparent adverse events. Events Procedure Events Event Event Time ENDO SCOPE IN TIME 5/24/2024  1:08 PM ENDO SCOPE OUT TIME 5/24/2024  1:26 PM Specimens No specimens collected Procedure Location [facility] [address] [facility] [id number] [phone] Referring Provider Grayson Nichols, APRN- [address] [address] Procedure Provider Ale Archibald MD     Lower extremity venous duplex  left    Result Date: 5/31/2024  Interpreted By:  Nathan Argueta and Weaver Jakob STUDY: [facility] US LOWER EXTREMITY VENOUS DUPLEX LEFT;  5/30/2024 3:58 pm   INDICATION: Signs/Symptoms:LLE swelling, eval for DVT.   COMPARISON: None.   ACCESSION NUMBER(S): [id number]   ORDERING CLINICIAN: ALEXANDRA AVILA   TECHNIQUE: Vascular ultrasound of the left lower extremity was performed. Real-time compression views as well as Gray scale, color Doppler and spectral Doppler waveform analysis was performed.   FINDINGS: Evaluation of the visualized portions of the left common femoral vein, proximal, mid, and distal femoral vein, and popliteal vein were performed.  Evaluation of the visualized portions of the  posterior tibial and peroneal veins were also performed. Evaluation of the calf veins limited due to edema.   Echogenic intraluminal material in the proximal greater saphenous vein with partial compressibility and color Doppler signal The evaluated veins demonstrate normal compressibility. There is intact venous flow demonstrating normal respiratory variability and normal augmentation of flow with calf compression. Therefore, there is no ultrasonographic evidence for deep vein thrombosis within the evaluated veins.       1.  No sonographic evidence for deep vein thrombosis within the evaluated veins of the left lower extremity. 2. Nonocclusive thrombus of the proximal greater saphenous vein, a superficial vein. Recommend correlation with concern for superficial thrombophlebitis.   I personally reviewed the images/study and I agree with the findings as stated by Agustín Cheatham MD. This study was interpreted at [place], [place].   MACRO: None   Signed by: Nathan Argueta 5/31/2024 5:32 AM Dictation workstation:   [id number]    ECG 12 Lead    Result Date: 5/30/2024  Atrial flutter with variable AV block Rightward axis Low voltage QRS Nonspecific ST and T wave abnormality Abnormal ECG When  compared with ECG of 16-MAY-2024 21:13, Significant changes have occurred Confirmed by Jvoanny Loera ([id number]) on 5/30/2024 11:03:21 AM    US chest    Result Date: 5/29/2024  Interpreted By:  Dinh Plunkett, STUDY: US CHEST; 5/29/20243:53 pm   INDICATION: Signs/Symptoms:Thoracentesis (Left) diagnostic/therapeutic.   COMPARISON: None.   ACCESSION NUMBER(S): [id number]   ORDERING CLINICIAN: ALEXANDRA AVILA   TECHNIQUE: INTERVENTIONALIST(S): Dinh Plunkett   CONSENT: The patient/patient's POA/next of kin was informed of the nature of the proposed procedure. The purposes, alternatives, risks, and benefits were explained and discussed. All questions were answered and consent was obtained.   SEDATION: None   TIME OUT: A time out was performed immediately prior to procedure start with the interventional team, correctly identifying the patient name, date of birth, MRN, procedure, anatomy (including marking of site and side), patient position, procedure consent form, relevant laboratory and imaging test results, antibiotic administration, safety precautions, and procedure-specific equipment needs.   FINDINGS: The patient was placed in the supine position.   The thoracic space was examined with grey scale ultrasound, and an absence of free fluid was demonstrated on ultrasound. Thoracic images were captured to demonstrate. A thoracentesis was not performed.       Absence of free fluid for procedure. A thoracentesis was not performed.   I personally performed and/or directly supervised this study and was present for the entire procedure.   Performed and dictated at [facility].   Signed by: Dinh Plunkett 5/29/2024 3:53 PM Dictation workstation:   [id number]    XR chest 1 view    Result Date: 5/28/2024  Interpreted By:  Jovany Nuñez, STUDY: XR CHEST 1 VIEW;  5/28/2024 8:03 am   INDICATION: Signs/Symptoms:Rule out infection.   COMPARISON: Chest radiograph dated 5/27/2024   ACCESSION  NUMBER(S): [id number]   ORDERING CLINICIAN: ALEXANDRA AVILA   FINDINGS: AP radiograph of the chest   Pacemaker electrodes appear in adequate position. There is virtually complete opacification of the left thorax. The heart mediastinum are shifted to the left indicating volume loss. Compensatory hyperaeration of the right lung and pulmonary vascular shunting to the right lung is noted increased from previous study.         1. Virtually complete opacification of the left thorax with heart and mediastinum shifted to the left indicating volume loss 2. Compensatory pulmonary vascular shunting to the right lung       Signed by: Jovany Nuñez 5/28/2024 10:38 AM Dictation workstation:   [id number]    Electrocardiogram, 12-lead PRN ACS symptoms    Result Date: 5/28/2024  Atrial fibrillation with rapid ventricular response Nonspecific ST and T wave abnormality , probably digitalis effect Abnormal ECG When compared with ECG of 28-MAY-2024 01:39, (unconfirmed) No significant change was found Confirmed by Jovanny Loera ([id number]) on 5/28/2024 10:17:48 AM    FL modified barium swallow study    Result Date: 5/26/2024  Interpreted By:  Sohail Torres and Riley Laura STUDY: FL MODIFIED BARIUM SWALLOW STUDY;; 5/25/2024 9:06 am   INDICATION: Signs/Symptoms:r\o dysphagia.   COMPARISON: None.   ACCESSION NUMBER(S): [id number]   ORDERING CLINICIAN: ALEXANDRA AVILA   TECHNIQUE: MBSS completed. Informed verbal consent obtained prior to completion of exam. Trials of thin, nectar thick,  puree, and regular solids given. Fluoroscopy time :  2 minutes.   SLP: Meri Hurley MS CCC/SLP Phone/Pager: Epic Chat   SPEECH FINDINGS: Reason for referral: Further assessment of oropharyngeal swallow Patient hx: Recent RLL PNA. S/s of aspiration w/3 oz Swallow Protocol Respiratory status: Nasal cannula Previous diet: Reg/thin   FINAL SPEECH RECOMMENDATIONS   Diet recommendations/feeding strategies: Solid Diet Recommendations : Easy to Chew Liquid Diet  Recommendations: Thin Medication Administration: Single w/ sips of water, whole in puree as needed   Safe Swallow Strategies/Guidelines: Only present PO intake when awake and alert Supervision/assist w/ PO intake to assure adherence to safe swallow strategies Upright positioning Slow rate/small boluses   Follow-up speech therapy recommended: Yes.   Short term goals: Pt will tolerate least restrictive diet with adequate oral phase and no s/s of aspiration. Date initiated: 5/25/24 Status: Goal initiated   Pt will adhere to safe swallow strategies during PO intake with > 90% accuracy independently. Date initiated: 5/25/24 Status: Goal initiated   Education provided: Yes.     Mechanics of the swallow summary: *Oral phase: Mild deficits.   Adequate bolus acceptance.  Prolonged mastication and bolus formation/transit w/ regular solids.  No oral residue.   *Pharyngeal phase: Mild deficits.   Incomplete epiglottic inversion, may be related to presence of NGT. Timely swallow initiation.  Adequate hyolaryngeal elevation/excursion.  Trace penetration x1 with consecutive boluses of thin liquids related to incomplete airway protection.  Ejected upon swallow completion.  No other penetration and no aspiration with any other consistency assessed.  Mild residue at the valleculae following the swallow.  Largely cleared with liquid wash.  Mildly reduced distention of PES.     SLP impressions with severity rating: Mild oropharyngeal dysphagia characterized by prolonged mastication/bolus formation and mild residue at the valleculae following the swallow.   Thin Liquids (MBSS) Rosenbek's Penetration Aspiration Scale, Thin Liquids (MBSS): 2. PENETRATION that CLEARS - contrast enter airway, above vocal cords, no residue     Nectar Thick Liquids (MBSS) Rosenbek's Penetration Aspiration Scale, Nectar thick liquids (MBSS): 1. NO ASPIRATION & NO PENETRATION - no aspiration, contrast does not enter airway       Purees (MBSS) Rosenbek's  Penetration Aspiration Scale, Purees (MBSS): 1. NO ASPIRATION & NO PENETRATION - no aspiration, contrast does not enter airway     Solids (MBSS) Mailebek's Penetration Aspiration Scale, Solids (MBSS): 1. NO ASPIRATION & NO PENETRATION - no aspiration, contrast does not enter airway     Speech Therapy section of this report signed by Meri Hurley MS CCC/SLP on 5/25/2024 at 10:17 am.   RADIOLOGY FINDINGS: Nasoenteric tube is partially visualized. No evidence of acute osseous abnormality of the cervical spine. Patient is edentulous.   Radiology section of this report signed by Dr. Torres.       1. No radiographic evidence of acute process in the neck. 2. Please refer to speech pathology report for additional findings.   MACRO: None   Signed by: Sohail Torres 5/26/2024 3:51 PM Dictation workstation:   [id number]    XR abdomen 1 view    Result Date: 5/25/2024  Interpreted By:  Marcie Shah, STUDY: XR ABDOMEN 1 VIEW; 5/25/2024 5:45 pm   INDICATION: Signs/Symptoms:ngt placed.   COMPARISON: Radiograph dated 05/25/2024, 2:33 p.m.   ACCESSION NUMBER(S): [id number]   ORDERING CLINICIAN: ALEXANDRA AVILA   FINDINGS: Single-view of the abdomen. The pelvis is not included. Enteric tube is in place with the tip projecting over the stomach. Positive contrast is identified in the gastric fundus. Prominent loops of bowel throughout the visualized upper abdomen. Nonobstructive bowel gas pattern.  Limited evaluation of pneumoperitoneum on supine imaging, however no gross evidence of free air is noted.   Extensive consolidation in visualized portion of the left lung.   Osseous structures demonstrate no acute bony changes.       1.  Enteric tube projecting over the proximal stomach. 2. Again seen multiple prominent loops of bowel throughout the upper abdomen. Correlation with ileus. Recommend follow-up radiograph.   Signed by: Marcie Rodriguez 5/25/2024 5:54 PM Dictation workstation:   [id number]    XR abdomen 1  view    Result Date: 5/25/2024  Interpreted By:  Marcie Shah, STUDY: XR ABDOMEN 1 VIEW; 5/25/2024 3:06 pm   INDICATION: Signs/Symptoms:distended abdomen, assess for dilated bowel loops.   COMPARISON: Radiograph dated 05/21/2024   ACCESSION NUMBER(S): [id number]   ORDERING CLINICIAN: ALEXANDRA AVILA   FINDINGS: Views of the abdomen and pelvis. What is presumed to be enteric tube is projecting over the lower mediastinum. Positive contrast is identified in the expected location of the stomach. There is also positive contrast throughout the loops of bowel in the lower abdomen. Prominent loops of colon. Limited evaluation of pneumoperitoneum on supine imaging, however no gross evidence of free air is noted.   Consolidative opacities in left lung.   Osseous structures demonstrate no acute bony changes.       1.  Prominent loops of colon which can be due to ileus. Recommend follow-up radiograph.   Signed by: Marcie Rodriguez 5/25/2024 5:33 PM Dictation workstation:   [id number]    XR chest 1 view    Result Date: 5/23/2024  Interpreted By:  Jovany Nuñez and Baker Zachary STUDY: XR CHEST 1 VIEW; ;  5/22/2024 6:45 pm   INDICATION: Signs/Symptoms:Worsening tachypnea, confusion.   COMPARISON: Chest radiograph on 05/21/2024.   ACCESSION NUMBER(S): [id number]   ORDERING CLINICIAN: SUN CHAMPAGNE   FINDINGS: Single AP view of the chest.   Right subclavian vein approach pacemaker/AICD in place with leads projecting over the right atrium and ventricle. Enteric tube coursing below diaphragm tip overlying the expected position of the gastric body.   The cardiomediastinal silhouette is obscured, similar to prior exam.   Persistent near-complete opacification of the left hemithorax, with mild interval increase in air bronchograms within the left upper lung zone. Redemonstration of patchy right infrahilar airspace opacities and interstitial prominence on the right. No right-sided pleural effusion or pneumothorax.   No  acute osseous abnormality.       1. Persistent near-complete opacification of the left hemithorax, with mild interval increase in air bronchograms within the left upper lung zone. 2. Persistent patchy right infrahilar airspace opacities and interstitial prominence throughout the right lung, which may represent aspiration/pneumonia in the appropriate clinical setting. 3. Medical devices as above.   I personally reviewed the images/study and I agree with the findings as stated by Dr. Raymond Armstrong M.D. This study was interpreted at [facility], [place], [place].   MACRO: None   Signed by: Jovany uNñez 5/23/2024 7:32 AM Dictation workstation:   [id number]    CT chest abdomen pelvis wo IV contrast    Result Date: 5/23/2024  Interpreted By:  Sohail Torres,  and Jeff Ibrahim STUDY: CT CHEST ABDOMEN PELVIS WO CONTRAST;  5/22/2024 12:15 pm   INDICATION: Signs/Symptoms:c/f sepsis.   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 N/V/D with [employer] concernign for UPI GIB and ongoing fevers.   COMPARISON: CT chest/abdomen/pelvis 05/18/2024   ACCESSION NUMBER(S): [id number]   ORDERING CLINICIAN: SUN CHAMPAGNE   TECHNIQUE: CT of the chest, abdomen and pelvis was performed. Contiguous axial images were obtained at 3 mm slice thickness through the chest, abdomen and pelvis. Coronal and sagittal reconstructions at 3 mm slice thickness were performed.  No intravenous contrast was administered; positive oral contrast was given.   FINDINGS: Please note that the study is limited without intravenous contrast.   Respiratory motion artifact.   CHEST:   LUNG/PLEURA/LARGE AIRWAYS: Trachea and bilateral mainstem bronchi are patent.   Again seen complete opacification of the left hemithorax with air bronchograms consistent with significant collapse of the left lung.   There is left  pleural thickening (example series 2, image 38) with a focus of left posterior chest wall extension at the level of the 9th-10th (series 2, image 71) and 10th-11th (series 2, image 82) rib spaces, similar to prior, findings consistent with patient's known mesothelioma.   Similar small loculated pleural effusion measuring 7.1 x 4.1 x 2.3 cm (series 2, image 66 and series 900, image 62).   Interval improvement of right lower lobe ill-defined patchy infiltrate (series 4, image 143). Slight interval increase in right trace pleural effusion. No new focal consolidations. No evidence of pneumothorax.   VESSELS: The previously noted ill-defined hypodensity in the left pulmonary artery is not well evaluated on this exam due to beam hardening artifact, positioning of patient's arms, and lack of venous contrast.   Dilated main pulmonary artery measuring 3.8 cm (series 2, image 40), similar to prior. Ectatic ascending aorta measuring 4.1 cm, similar to prior.  Mild to moderate coronary artery calcifications are present.   HEART: The heart is normal in size.  Trace pericardial fluid, similar to prior. Right-sided subclavian approach dual-chambercardiac ICD/pacemaker, with the leads in the right atrium and right ventricle.   MEDIASTINUM AND ANDREA: Multiple prominent mediastinal and perihilar lymph nodes similar to prior. For example:   1.3 cm subcarinal lymph node (series 2, image 48).   1 cm perihilar lymph node (series 2, image 33).   Enteric tube courses through the esophagus and terminates in the body of the stomach. Otherwise otherwise esophagus is within normal limits.   CHEST WALL AND LOWER NECK: Right chest wall cardiac pacemaker as described above. The visualized thyroid gland appears within normal limits.   ABDOMEN:   LIVER: The liver is normal in size. Similar hypoattenuating lesions with the largest measuring 2 cm segment 4A (series 2, image 71).   BILE DUCTS: The bile ducts are not dilated.   GALLBLADDER: The  gallbladder is not distended. Calcified stones are noted.   PANCREAS: The pancreas appears unremarkable.   SPLEEN: Within normal limits.   ADRENAL GLANDS: Bilateral adrenal glands appear normal.   KIDNEYS AND URETERS: Bilateral mildly atrophic kidneys with mild perinephric stranding similar to prior. There is a 0.6 cm left midpole nonobstructing renal calculi. No hydroureteronephrosis. No right nephroureterolithiasis.   PELVIS:   BLADDER: The urinary bladder is underdistended with Forrest catheter in place. There is air in the urinary bladder likely from the Forrest.   REPRODUCTIVE ORGANS: No pelvic masses.   BOWEL: NG tube body of the stomach. Otherwise, the stomach is unremarkable. The small is normal in caliber and demonstrate no wall thickening. Proximal sigmoid diverticulosis with interval improvement in the mild wall thickening and adjacent fat stranding involving the proximal sigmoid colon (series 902, image 93). Interval insertion of rectal tube.   VESSELS: Mild atherosclerosis of the abdominal aorta and its branching vessels. There is no aneurysmal dilatation of the abdominal aorta..   Interval flattening of the inferior vena cava (image 2, image 121)/   PERITONEUM/RETROPERITONEUM/LYMPH NODES: No ascites or free air, no fluid collection.  The retroperitoneum appears unremarkable.  No enlarged mesenteric lymph nodes.   ABDOMINAL WALL: Within normal limits.   BONES: No suspicious osseous lesions are present. Degenerative discogenic disease is noted in the lower thoracic and lumbar spine most severe at L3-L4, L4-L5, and L5-L6..       Chest 1. Interval improvement of the right lower lobe patchy infiltrate. 2. Again seen left lung collapse, left pleural thickening with invasion into the left posterior chest wall, and small left loculated pleural effusion consistent with patient's known mesothelioma and is unchanged when compared to prior CT from 05/18/2024. 3. Unchanged prominent mediastinal and perihilar lymph  nodes. 4. Previously noted left pulmonary artery thrombus is not well evaluated on this exam due to beam hardening artifact, positioning of patient's arms, and lack of intravenous contrast.   Abdomen-Pelvis 1. Interval flattening of the inferior vena cava which may be seen with hypovolemia. Recommend correlation with patient's fluid volume status. 2. Improved proximal sigmoid diverticulitis. 3. Additional unchanged findings as noted above.   I personally reviewed the images/study and I agree with the findings as stated by Patrice Aguilar DO, PGY-2. This study was interpreted at [facility], [place], [place].   MACRO: None   Signed by: Sohail Torres 5/23/2024 12:22 AM Dictation workstation:   [id number]    Transthoracic Echo (TTE) Complete    Result Date: 5/21/2024   [facility], [address] [place] [address]                Tel [phone] and Fax [phone] TRANSTHORACIC ECHOCARDIOGRAM REPORT  Patient Name:      ZAKIYA SHAHEED LAWSON       Reading Physician:    [id number] Delvis Miller MD Study Date:        5/21/2024            Ordering Provider:    [id number] CHAMPAGNE MRN/PID:           [id number]             Fellow: Accession#:        [id number]         Nurse: Date of Birth/Age: 1935 / 88 years Sonographer:          Dionisio Braga RDCS Gender:            M                    Additional Staff: Height:            172.72 cm            Admit Date: Weight:            109.77 kg            Admission Status:     Inpatient -                                                               Routine BSA / BMI:         2.22 m2 / 36.80      Encounter#:           [id number]                    kg/m2                                         Department Location:  [address]  Pressure: 96 /57 mmHg Study Type:    TRANSTHORACIC ECHO (TTE) COMPLETE Diagnosis/ICD: Unspecified atrial fibrillation-I48.91; Hypertensive heart                disease with heart failure-I11.0 Indication:    hypoxic resp failure w/ new o2 requirement c/f HF CPT Code:      Echo Complete w Full Doppler-[id number] Patient History: Pertinent History: PAD, afib/flutter, hx DVT on warfarin, HTN, CAD s/p stent,                    mitral regurg, HLD,JOVANNI, basal cell ca on face, s/p removal                    and radiation of malignant mesothelioma, S/p L VATS with                    decort 5/2022. Study Detail: The following Echo studies were performed: 2D, M-Mode, color flow               and Doppler. Technically challenging study due to body habitus,               patient lying in supine position, poor acoustic windows and               prominent lung artifact. Definity used as a contrast agent for               endocardial border definition. Total contrast used for this               procedure was 2.0 mL via IV push. Unable to obtain suprasternal               notch view.  PHYSICIAN INTERPRETATION: Left Ventricle: The left ventricular systolic function is hyperdynamic, with an estimated ejection fraction of 70-75%. The patient is in atrial fibrillation which may influence the estimate of left ventricular function and transvalvular flows. There are no regional wall motion abnormalities. The left ventricular cavity size is normal. Left ventricular diastolic filling was not assessed. Left Atrium: The left atrium was not well visualized. Right Ventricle: The right ventricle was not well visualized. Unable to determine right ventricular systolic function. Right Atrium: The right atrium was not well visualized. Aortic Valve: The aortic valve is trileaflet. There is trivial aortic valve regurgitation. The peak instantaneous gradient of the aortic valve is 13.2 mmHg. Mitral Valve: The mitral valve is normal in structure. There is mild  mitral annular calcification. There is trace mitral valve regurgitation. Tricuspid Valve: The tricuspid valve was not well visualized. Tricuspid regurgitation was not well visualized. Tricuspid regurgitation was not assessed. The Doppler estimated RVSP is mildly elevated at 36.6 mmHg. Pulmonic Valve: The pulmonic valve is not well visualized. There is physiologic pulmonic valve regurgitation. Pericardium: There is a trivial pericardial effusion. Aorta: The aortic root is abnormal. The aortic root appears moderately dilated and measures 4.50 cm. The Asc Ao is 3.70 cm. There is mild dilatation of the ascending aorta. Systemic Veins: The inferior vena cava appears to be of normal size. There is IVC inspiratory collapse greater than 50%. In comparison to the previous echocardiogram(s): Compared with study from 5/3/2022, the previous study was a MARY during a MARY/DCCV and comparison is limited.  CONCLUSIONS:  1. Poorly visualized anatomical structures due to suboptimal image quality.  2. Left ventricular systolic function is hyperdynamic with a 70-75% estimated ejection fraction.  3. The patient is in atrial fibrillation which may influence the estimate of left ventricular function and transvalvular flows.  4. Mildly elevated RVSP.  5. Moderately dilated aortic root. QUANTITATIVE DATA SUMMARY: 2D MEASUREMENTS:                          Normal Ranges: Ao Root d:     4.50 cm   (2.0-3.7cm) LAs:           2.50 cm   (2.7-4.0cm) IVSd:          0.80 cm   (0.6-1.1cm) LVPWd:         0.80 cm   (0.6-1.1cm) LVIDd:         4.10 cm   (3.9-5.9cm) LVIDs:         2.20 cm LV Mass Index: 43.9 g/m2 LV % FS        46.3 % AORTA MEASUREMENTS:                    Normal Ranges: Asc Ao, d: 3.70 cm (2.1-3.4cm) LV SYSTOLIC FUNCTION BY 2D PLANIMETRY (MOD):                     Normal Ranges: EF-A4C View: 73.6 % (>=55%) EF-A2C View: 80.4 % EF-Biplane:  78.8 % LV DIASTOLIC FUNCTION:                     Normal Ranges: MV Peak E: 1.09 m/s (0.7-1.2 m/s) MV  DT:     245 msec (150-240 msec) MITRAL VALVE:                 Normal Ranges: MV DT: 245 msec (150-240msec) AORTIC VALVE:                          Normal Ranges: AoV Vmax:      1.82 m/s  (<=1.7m/s) AoV Peak P.2 mmHg (<20mmHg) LVOT Max Philipp:  1.42 m/s  (<=1.1m/s) LVOT VTI:      18.80 cm LVOT Diameter: 2.00 cm   (1.8-2.4cm) AoV Area,Vmax: 2.45 cm2  (2.5-4.5cm2)  RIGHT VENTRICLE: TAPSE: 15.8 mm RV s'  0.18 m/s TRICUSPID VALVE/RVSP:                             Normal Ranges: Peak TR Velocity: 2.90 m/s RV Syst Pressure: 36.6 mmHg (< 30mmHg) PULMONIC VALVE:                      Normal Ranges: PV Max Philipp: 0.9 m/s  (0.6-0.9m/s) PV Max PG:  3.2 mmHg  [id number] Delvis Miller MD Electronically signed on 2024 at 5:46:15 PM  ** Final **     XR abdomen 1 view    Result Date: 2024  Interpreted By:  Jovany Nuñez, STUDY: XR ABDOMEN 1 VIEW;  2024 12:58 pm   INDICATION: Signs/Symptoms:post ngt.   COMPARISON: One-view abdomen 2024 13 a.m.   ACCESSION NUMBER(S): [id number]   ORDERING CLINICIAN: SUN CHAMPAGNE   FINDINGS: One-view abdomen   An enteric tube is positioned within the region of the gastric fundus several cm below the expected gastroesophageal junction. There is a predominantly fluid-filled small bowel and colon pattern. There is less gastric distention with air.   Opacification of the left thorax and multiple air bronchograms are noted.       1.  The enteric tube is positioned within the gastric fundus 2. Advancement of the tube would be recommended for more optimal positioning 3. Predominantly fluid-filled small bowel and colon pattern 4. Opacification of the visualized left thorax and multiple air bronchograms within the left lung   MACRO: None   Signed by: Jovany Nuñez 2024 2:19 PM Dictation workstation:   [id number]    XR abdomen 1 view    Result Date: 2024  Interpreted By:  Jovany Nuñez, STUDY: XR ABDOMEN 1 VIEW;  2024 11:13 am   INDICATION: Signs/Symptoms:ABD DISTENTION.    COMPARISON: None.   ACCESSION NUMBER(S): [id number]   ORDERING CLINICIAN: SUN CHAMPAGNE   FINDINGS: The stomach is distended with air. There is a predominantly fluid-filled small bowel and colon pattern. Gas is demonstrated within the colon and distal ileum. The left ventricular pacemaker electrode appears in adequate position. The left ventricle appears mildly enlarged. Osseous and articular anatomy is normal for age.       1.  The stomach is moderately distended with air otherwise, nonspecific predominantly fluid-filled bowel pattern   MACRO: None   Signed by: Jovany Nuñez 5/21/2024 2:17 PM Dictation workstation:   [id number]    XR chest 1 view    Result Date: 5/21/2024  Interpreted By:  Jovany Nuñez,  and Guicho Castro STUDY: XR CHEST 1 VIEW;  5/21/2024 6:14 am   INDICATION: Signs/Symptoms:SOB.   COMPARISON: Chest radiograph 05/16/2024 CT chest 05/18/2024   ACCESSION NUMBER(S): [id number]   ORDERING CLINICIAN: SUN CHAMPAGNE   FINDINGS: AP radiograph of the chest was provided.   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   CARDIOMEDIASTINAL SILHOUETTE: Cardiomediastinal silhouette is stable in size with complete obscuration of the left cardiomediastinal border.   LUNGS: Persistent near-complete opacification of the left hemithorax with interval reduced air bronchograms compared to prior. Persistent patchy right infrahilar airspace opacities are noted. Prominent perihilar and interstitial lung markings are noted on the right. No consolidation, effusion, or pneumothorax on the right.   ABDOMEN: No remarkable upper abdominal findings.   BONES: No acute osseous changes.       1. Virtually complete opacification of the left hemithorax with interval reduced air bronchograms. 2. Persistent patchy right infrahilar airspace opacities which may reflect aspiration changes and/or pneumonia in the appropriate clinical setting.   I  personally reviewed the images/study and I agree with the findings as stated by Matthew Lindo MD. This study was interpreted at [facility], [place], [place].   MACRO: None   Signed by: Jovany Nuñez 5/21/2024 11:26 AM Dictation workstation:   [id number]    XR abdomen 1 view    Result Date: 5/20/2024  Interpreted By:  Sofya Ahumada, STUDY: XR ABDOMEN 1 VIEW;  5/19/2024 10:42 pm   INDICATION: Signs/Symptoms:abdominal distension.   COMPARISON: CT: 05/18/2024   ACCESSION NUMBER(S): [id number]   ORDERING CLINICIAN: SUN CHAMPAGNE   FINDINGS: Nonobstructive bowel gas pattern. Limited evaluation of pneumoperitoneum on supine imaging, however no gross evidence of free air is noted.   Whiteout throughout the left hemithorax.   Osseous structures demonstrate no acute bony changes.       1.  Nonspecific and nonobstructive gas pattern.   MACRO: None   Signed by: Sofya Ahumada 5/20/2024 8:47 AM Dictation workstation:   [id number]    ECG 12 lead    Result Date: 5/18/2024  Sinus tachycardia with 1st degree AV block Lateral infarct , age undetermined Inferior infarct (cited on or before 10-DEC-2023) Prolonged QT Abnormal ECG When compared with ECG of 10-DEC-2023 11:23, Significant changes have occurred See ED provider note for full interpretation and clinical correlation Confirmed by Renee Dotson ([id number]) on 5/18/2024 11:02:23 AM    CT chest abdomen pelvis w IV contrast    Result Date: 5/18/2024  Interpreted By:  Collins Conde, STUDY: CT CHEST ABDOMEN PELVIS W IV CONTRAST;  5/18/2024 8:38 am   INDICATION: Signs/Symptoms:abd pain, LLQ TTP.   COMPARISON: Chest CT scan 12/10/2020. Chest and abdomen CT scan dated 06/14/2023.   ACCESSION NUMBER(S): [id number]   ORDERING CLINICIAN: THEA MURRAY   TECHNIQUE: CT of the chest, abdomen, and pelvis was performed.  Contiguous axial images were obtained at 3 mm slice thickness through the chest, abdomen and pelvis. Coronal and sagittal  reconstructions at 3 mm slice thickness were performed. 75 ml of contrast Omnipaque were administered intravenously without immediate complication.   FINDINGS: CHEST:   LUNG/PLEURA/LARGE AIRWAYS: Significant collapse of the left lung with heterogenous appearance, pleural thickening and small loculated pleural fluid measuring 7.8 cm consistent with the patient's known mesothelioma. Focus of left posterior chest wall extension (series 201, image 77 and image 89) at the level of the 9th 10th and 10th 11th rib spaces   Right lower lobe ill-defined patchy infiltrate measuring 2.3 x 1.4 cm. Trace right-sided pleural effusion with surrounding atelectasis.   Redemonstrated left hilar ill-defined enhancing fullness appears extending to the left pulmonary trunk possibly tumor thrombus and felt less likely bland thrombus.   VESSELS: The aforementioned left hilar ill-defined soft tissue fullness appears extending to the left pulmonary trunk with almost 50% occlusion. Mild coronary artery calcifications   HEART: The heart is normal in size.  Trace pericardial effusion. Right chest wall pacemaker noted.   MEDIASTINUM AND ANDREA: Multiple prominent mediastinal lymph nodes in the largest in the subcarinal region measuring 1.6 cm. The esophagus is normal.   CHEST WALL AND LOWER NECK: The soft tissues of the chest wall demonstrate no gross abnormality. The visualized thyroid gland appears within normal limits.   ABDOMEN:   LIVER: Normal size. Normal contour. Stable hypoattenuating lesions likely benign in the largest segment 4A measuring 2 cm.   BILE DUCTS: The intrahepatic and extrahepatic ducts are not dilated.   GALLBLADDER: Cholelithiasis.   PANCREAS: The pancreas appears unremarkable without evidence of ductal dilatation or masses.   SPLEEN: The spleen is normal in size.   ADRENAL GLANDS: Bilateral adrenal glands appear normal.   KIDNEYS AND URETERS: The kidneys are normal in size and enhance symmetrically.  No  hydroureteronephrosis. Nonobstructive left midpole 0.6 cm calculus.   PELVIS:   BLADDER: Underdistended with Forrest catheter in place.   REPRODUCTIVE ORGANS: No pelvic masses.   BOWEL: The stomach, small and large bowel are normal in appearance without wall thickening or obstruction. Proximal sigmoid diverticulosis with mild wall thickening could be related to episodes of underlying mild diverticulitis. No ascites. Pneumoperitoneum.     VESSELS: Mild vascular calcifications and atherosclerotic changes.   PERITONEUM/RETROPERITONEUM/LYMPH NODES: No enlarged intra abdominopelvic lymphadenopathy.   BONE AND SOFT TISSUE: Multilevel degenerative spine changes and facet joint disease. Trace soft tissue thickening at the level of the umbilicus.       CHEST: 1. Significant collapse of the left lung with heterogenous appearance, pleural thickening and small loculated pleural fluid measuring 7.8 cm consistent with the patient's known mesothelioma which have worsened from the prior CT scan dated 06/14/2023 and grossly unchanged from 12/10/2023 unenhanced CT scan allowing for differences in techniques. Focus of new left posterior chest wall invasion noted at the level of 9th 10th and 10th 11th rib spaces. 2. Right lower lobe ill-defined patchy infiltrate measuring 2.3 x 1.4 cm. Trace right-sided pleural effusion with surrounding atelectasis. 3. Redemonstrated left hilar ill-defined enhancing fullness appears extending to the left pulmonary trunk likely tumor thrombus and felt less likely bland thrombus which has significantly worsened from 06/14/2023 CT scan. 4. Mildly enlarged multiple mediastinal lymph nodes in the largest in the subcarinal region measuring 1.6 cm, grossly unchanged from prior.   ABDOMEN-PELVIS: 1. Proximal sigmoid diverticulosis with mild wall thickening could be related to episodes of underlying mild uncomplicated diverticulitis 2. Other ancillary findings are unchanged.   The significant results of the study  were relayed by me to and acknowledged by Janes Nichols NP on 05/18/2024 at 10:40 a.m. over the phone.   MACRO: None   Signed by: Collins Conde 5/18/2024 10:47 AM Dictation workstation:   [id number]    US renal complete    Result Date: 5/17/2024  Interpreted By:  Karel Lowry, STUDY: US RENAL COMPLETE;  5/17/2024 8:02 pm   INDICATION: Signs/Symptoms:Urinary retention?.   COMPARISON: CT abdomen pelvis 05/16/2024   ACCESSION NUMBER(S): [id number]   ORDERING CLINICIAN: KEMI AUGUSTINE   TECHNIQUE: Grayscale and color Doppler sonographic images of the kidneys.   FINDINGS:     RIGHT KIDNEY: The right kidney measures 11.3 cm in length. Diffuse renal cortical thinning. No hydronephrosis. No renal calculus. No perinephric fluid.   LEFT KIDNEY: The left kidney measures 11.2 cm in length. Diffuse renal cortical thinning. No hydronephrosis. There is a 1 cm nonobstructing left renal calculus. No perinephric fluid.   URINARY BLADDER: Urinary bladder is decompressed, limiting its evaluation, with Forrest catheter in place.       1. Diffuse bilateral renal cortical thinning in keeping with chronic renal disease. No hydronephrosis. 2. Nonobstructing 1 cm left renal calculus. 3. Decompressed urinary bladder due to Forrest catheter, limiting its evaluation.   MACRO: None.   Signed by: Karel Lowry 5/17/2024 10:07 PM Dictation workstation:   [id number]    CT abdomen pelvis w IV contrast    Result Date: 5/17/2024  STUDY: CT Abdomen and Pelvis with IV Contrast; 05/16/2024 10:27 pm INDICATION: Abdominal pain.  Distension. COMPARISON: CT CAP 06/14/2023 and 03/21/2023. ACCESSION NUMBER(S): [id number] ORDERING CLINICIAN: KAILA HALEY TECHNIQUE: CT of the abdomen and pelvis was performed.  Contiguous axial images were obtained at 3 mm slice thickness through the abdomen and pelvis. Coronal and sagittal reconstructions at 3 mm slice thickness were performed.  Omnipaque 350, 75 mL was administered intravenously.  FINDINGS: LOWER  CHEST: Cardiac size is enlarged with pacer leads in the right atrium and right ventricle.  Moderate calcified coronary plaque is noted.  Mild calcified plaque is seen in the included descending thoracic aorta. Thoracic aorta is not aneurysmal.  There is complete collapse and consolidation of the left lung at the left lung base with loculated effusion seen at the left lung base.  Increased AP diameter of the thorax suggests chronic changes of COPD.  Respiratory motion limits assessment in the lung bases and upper abdomen.  Small sliding-type hiatal hernia is noted.  ABDOMEN:  LIVER: Simple fluid density cysts are noted in the dome of the liver along with subcentimeter hypodensities in the liver which are too small to definitively characterize but statistically most likely represent simple cysts or hemangiomas.  No hepatomegaly.   BILE DUCTS: No intrahepatic or extrahepatic biliary ductal dilatation.  GALLBLADDER: Gallbladder contains gallstones in the gallbladder neck but is otherwise unremarkable.  STOMACH: No abnormalities identified.  PANCREAS: No masses or ductal dilatation.  SPLEEN: No splenomegaly or focal splenic lesion.  ADRENAL GLANDS: No thickening or nodules.  KIDNEYS AND URETERS: Kidneys are normal in location.  Moderate bilateral renal cortical thinning is seen with mild bilateral perinephric stranding. Nonobstructing 6 mm calculus is seen in the mid left kidney.  No ureteral calculi.  PELVIS:  BLADDER: Urinary bladder is decompressed by Forrest catheter, limiting assessment.   REPRODUCTIVE ORGANS: No abnormalities identified.  BOWEL: Diverticulosis is noted throughout the descending colon and sigmoid colon.  There is subtle fat stranding adjacent to the proximal sigmoid colon in the left upper pelvis which may represent mild acute diverticulitis.  Appendix is not definitively identified.  Terminal ileum is unremarkable.   VESSELS: Mild calcified atheromatous plaque is seen in the abdominal aorta and iliac  arteries.  No aneurysm.  PERITONEUM/RETROPERITONEUM/LYMPH NODES: No free fluid.  No pneumoperitoneum. No lymphadenopathy.  ABDOMINAL WALL: No abnormalities identified. SOFT TISSUES: No abnormalities identified.  BONES: No acute fracture or aggressive osseous lesion.  Generalized osseous demineralization is noted.  There is a mild levoconvex curvature of the lumbar spine centered at L4.  Moderate multilevel disc disease is seen in the spine.  There is moderate bilateral mid and lower lumbar facet arthrosis.    1.  Subtle inflammatory change along the proximal sigmoid colon which may represent a low-grade acute diverticulitis.  No definite perforation or abscess. 2.  Complete collapse and consolidation of the left lung with a loculated effusion at the left lung base, unchanged compared to the prior chest CT and likely consistent with patient's reported mesothelioma, possibly chronic post treatment change. 3.  Cardiomegaly with chronic changes suggesting COPD. 4.  Cholelithiasis. 5.  Moderate bilateral renal atrophy with nonobstructing 6 mm left renal calculus. 6.  Additional chronic changes as described. Signed by Lucas Owen    XR chest 1 view    Result Date: 5/16/2024  STUDY: Chest Radiograph;  5/16/2024 9:40 PM INDICATION: Weakness. COMPARISON: 12/10/2023 XR Chest. ACCESSION NUMBER(S): [id number] ORDERING CLINICIAN: KAILA HALEY TECHNIQUE:  Frontal chest was obtained at 21:40 hours. FINDINGS: CARDIOMEDIASTINAL SILHOUETTE: Cardiomediastinal silhouette is normal in size and configuration.  LUNGS: Complete opacification left hemithorax present.  This appears chronic likely related to volume loss and chronic consolidation.  This appears similar prior exams.  ABDOMEN: No remarkable upper abdominal findings.  BONES: No acute osseous changes.    Complete opacification left hemithorax likely related to volume loss and chronic consolidation. Signed by Andrade Angel MD      Current Facility-Administered Medications:      acetaminophen (Tylenol) tablet 650 mg, 650 mg, oral, q4h PRN **OR** acetaminophen (Tylenol) oral liquid 650 mg, 650 mg, nasogastric tube, q4h PRN **OR** acetaminophen (Tylenol) suppository 650 mg, 650 mg, rectal, q4h PRN, Vipul Sotelo MD    cefepime (Maxipime) in dextrose 5% 50 mL IV 1 g, 1 g, intravenous, q12h, Vipul Sotelo MD    dilTIAZem (Cardizem) 125 mg in dextrose 5% 125 mL (1 mg/mL) infusion (premix), 10 mg/hr, intravenous, Continuous, Vipul Sotelo MD, Last Rate: 10 mL/hr at 06/14/24 2121, 10 mg/hr at 06/14/24 2121    enoxaparin (Lovenox) syringe 120 mg, 1 mg/kg, subcutaneous, q24h, Vipul Sotelo MD    melatonin tablet 3 mg, 3 mg, oral, Nightly PRN, Vipul Sotelo MD    ondansetron (Zofran) tablet 4 mg, 4 mg, oral, q8h PRN **OR** ondansetron (Zofran) injection 4 mg, 4 mg, intravenous, q8h PRN, Vipul Stoelo MD    [START ON 6/15/2024] pantoprazole (ProtoNix) EC tablet 40 mg, 40 mg, oral, Daily before breakfast **OR** [START ON 6/15/2024] pantoprazole (ProtoNix) injection 40 mg, 40 mg, intravenous, Daily before breakfast, Vipul Sotelo MD    polyethylene glycol (Glycolax, Miralax) packet 17 g, 17 g, oral, Daily PRN, Vipul Sotelo MD    [START ON 6/15/2024] vancomycin (Vancocin) in dextrose 5 % water (D5W) 100 mL  mg, 500 mg, intravenous, q24h, Carol Gonzalez, PharmD   Assessment/Plan   Extensive cellulitis of the abdominal wall and lower extremities and scrotum 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  Patient recently had clinical trial of chemo and patient did not tolerate.  No further plan for advanced mesothelioma.  Right pleural effusion .patient does not want any thoracentesis and want to be treated for cellulitis now.  Acute on chronic respiratory failure with hypoxia.  COPD continue bronchodilator.  Former smoker.  Morbid  obesity.  Obstructive sleep apnea.  History of A-fib with RVR.  Hypertension.  Dyslipidemia.  GERD.  Patient DNR.  DVT prophylaxis.  PT OT.  P.o. diet.    Luis Villasenor MD

## 2024-06-15 NOTE — CONSULTS
UROLOGY CONSULT FOR SATURDAY, 6/15/2024    SUBJECTIVE: Pleasant 89-year-old white male currently being referred urologically for the finding of a approximately 3 x 6 cm right upper scrotal collection of fluid possible abscess or hydrocele, unclear at the present, complete opacification of the left hemithorax and increased right pleural effusion  Patient is very obtunded short of breath considerable abdominal swelling and edema with induration and anasarca  External catheter currently in place unable to clinically assess and examine genitalia due to the severe edema, nontender  Numerous significant medical comorbidities including advanced mesothelioma with metastatic disease     OBJECTIVE:   On today's visit patient and family are present, abdomen is severely distended and firm with probable anasarca edema, anasarca involves the pelvic area genitalia and upper thighs, unable to accurately examine the scrotum but no tenderness currently present  Patient is currently afebrile  Renal insufficiency with serum creatinine of 3.35  Hemoglobin 8.2, normal WBC count of 4800  Agree with aggressive antibiotic coverage and treatment including cefepime, Flagyl, vancomycin  CT scan was reviewed personally and agree with the report as noted, 3 x 6 cm right upper scrotal collection of fluid unclear whether hydrocele or abscess but nontender  Kidneys are unremarkable with the none obstructing 5 mm right lower pole calculus  Confirms significant abdominal wall edema and right pleural effusion    ASSESSMENT/PLAN  6 cm x 3 cm upper right scrotal collection of fluid unclear whether hydrocele or abscess  Severe multiple associated comorbidities as listed and reviewed  On admission patient was very insistent on agreeing only to IV antibiotic treatments, does not wish any more aggressive treatments including surgical options  Family is currently speaking with hospice representative  Continue aggressive antibiotic coverage and supportive  care    Thank you for allowing us to participate in the patient's care and management and we will follow along urologically    Additional medical and historical objective data reviewed and listed below      Current Facility-Administered Medications:     acetaminophen (Tylenol) tablet 650 mg, 650 mg, oral, q4h PRN **OR** acetaminophen (Tylenol) oral liquid 650 mg, 650 mg, nasogastric tube, q4h PRN **OR** acetaminophen (Tylenol) suppository 650 mg, 650 mg, rectal, q4h PRN, Vipul Sotelo MD    benzonatate (Tessalon) capsule 100 mg, 100 mg, oral, TID PRN, Vipul Sotelo MD, 100 mg at 06/15/24 1111    cefepime (Maxipime) in dextrose 5% 50 mL IV 1 g, 1 g, intravenous, q12h, Vipul Sotelo MD, 1 g at 06/15/24 0900    codeine-guaifenesin (Robitussin-AC)  mg/5 mL syrup 5 mL, 5 mL, oral, q6h PRN, Deondre Wagner MD, 5 mL at 06/15/24 1414    dilTIAZem (Cardizem) 125 mg in dextrose 5% 125 mL (1 mg/mL) infusion (premix), 5 mg/hr, intravenous, Continuous, Deondre Wagner MD, Last Rate: 5 mL/hr at 06/15/24 0901, 5 mg/hr at 06/15/24 0901    enoxaparin (Lovenox) syringe 120 mg, 1 mg/kg, subcutaneous, q24h, Vipul Sotelo MD, 120 mg at 06/14/24 2247    melatonin tablet 3 mg, 3 mg, oral, Nightly PRN, Vipul Sotelo MD    metroNIDAZOLE (Flagyl) 500 mg in NaCl (iso-os) 100 mL, 500 mg, intravenous, q8h, Deondre Wagner MD, Stopped at 06/15/24 1211    ondansetron (Zofran) tablet 4 mg, 4 mg, oral, q8h PRN **OR** ondansetron (Zofran) injection 4 mg, 4 mg, intravenous, q8h PRN, Vipul Sotelo MD    pantoprazole (ProtoNix) EC tablet 40 mg, 40 mg, oral, Daily before breakfast, 40 mg at 06/15/24 0603 **OR** pantoprazole (ProtoNix) injection 40 mg, 40 mg, intravenous, Daily before breakfast, Vipul Sotelo MD    polyethylene glycol (Glycolax, Miralax) packet 17 g, 17 g, oral, Daily PRN, Vipul Sotelo MD    vancomycin (Vancocin) in dextrose 5 % water (D5W) 100 mL  mg, 500 mg, intravenous, q24h, Carol Gonzalez, JohannaD     vancomycin (Vancocin) pharmacy to dose - pharmacy monitoring, , miscellaneous, Daily PRN, Kati Miller, [facility]   Results for orders placed or performed during the hospital encounter of 06/14/24 (from the past 96 hour(s))   ECG 12 lead   Result Value Ref Range    Ventricular Rate 126 BPM    Atrial Rate 129 BPM    QRS Duration 78 ms    QT Interval 324 ms    QTC Calculation(Bazett) 469 ms    R Axis 81 degrees    T Axis 42 degrees    QRS Count 20 beats    Q Onset 229 ms    T Offset 391 ms    QTC Fredericia 415 ms   CBC and Auto Differential   Result Value Ref Range    WBC 4.3 (L) 4.4 - 11.3 x10*3/uL    nRBC 0.0 0.0 - 0.0 /100 WBCs    RBC 2.93 (L) 4.50 - 5.90 x10*6/uL    Hemoglobin 8.6 (L) 13.5 - 17.5 g/dL    Hematocrit 28.0 (L) 41.0 - 52.0 %    MCV 96 80 - 100 fL    MCH 29.4 26.0 - 34.0 pg    MCHC 30.7 (L) 32.0 - 36.0 g/dL    RDW 16.2 (H) 11.5 - 14.5 %    Platelets 159 150 - 450 x10*3/uL    Neutrophils % 73.8 40.0 - 80.0 %    Immature Granulocytes %, Automated 1.9 (H) 0.0 - 0.9 %    Lymphocytes % 7.0 13.0 - 44.0 %    Monocytes % 11.5 2.0 - 10.0 %    Eosinophils % 5.6 0.0 - 6.0 %    Basophils % 0.2 0.0 - 2.0 %    Neutrophils Absolute 3.14 1.60 - 5.50 x10*3/uL    Immature Granulocytes Absolute, Automated 0.08 0.00 - 0.50 x10*3/uL    Lymphocytes Absolute 0.30 (L) 0.80 - 3.00 x10*3/uL    Monocytes Absolute 0.49 0.05 - 0.80 x10*3/uL    Eosinophils Absolute 0.24 0.00 - 0.40 x10*3/uL    Basophils Absolute 0.01 0.00 - 0.10 x10*3/uL   Comprehensive metabolic panel   Result Value Ref Range    Glucose 104 (H) 74 - 99 mg/dL    Sodium 140 136 - 145 mmol/L    Potassium 5.1 3.5 - 5.3 mmol/L    Chloride 107 98 - 107 mmol/L    Bicarbonate 26 21 - 32 mmol/L    Anion Gap 12 10 - 20 mmol/L    Urea Nitrogen 51 (H) 6 - 23 mg/dL    Creatinine 3.50 (H) 0.50 - 1.30 mg/dL    eGFR 16 (L) >60 mL/min/1.73m*2    Calcium 8.5 (L) 8.6 - 10.3 mg/dL    Albumin 3.0 (L) 3.4 - 5.0 g/dL    Alkaline Phosphatase 60 33 - 136 U/L    Total Protein 6.2 (L) 6.4 -  8.2 g/dL    AST 31 9 - 39 U/L    Bilirubin, Total 0.3 0.0 - 1.2 mg/dL    ALT 14 10 - 52 U/L   Lipase   Result Value Ref Range    Lipase 26 9 - 82 U/L   Lactate   Result Value Ref Range    Lactate 1.3 0.4 - 2.0 mmol/L   C-Reactive Protein   Result Value Ref Range    C-Reactive Protein 10.32 (H) <1.00 mg/dL   Sedimentation Rate   Result Value Ref Range    Sedimentation Rate 38 (H) 0 - 20 mm/h   Blood Culture    Specimen: Peripheral Venipuncture; Blood culture   Result Value Ref Range    Blood Culture Loaded on Instrument - Culture in progress    Light Blue Top   Result Value Ref Range    Extra Tube Hold for add-ons.    Lavender Top   Result Value Ref Range    Extra Tube Hold for add-ons.    Blood Culture    Specimen: Peripheral Venipuncture; Blood culture   Result Value Ref Range    Blood Culture Loaded on Instrument - Culture in progress    SST TOP   Result Value Ref Range    Extra Tube Hold for add-ons.    B-Type Natriuretic Peptide   Result Value Ref Range     (H) 0 - 99 pg/mL   Vancomycin   Result Value Ref Range    Vancomycin 17.9 5.0 - 20.0 ug/mL   CBC   Result Value Ref Range    WBC 4.8 4.4 - 11.3 x10*3/uL    nRBC 0.0 0.0 - 0.0 /100 WBCs    RBC 2.79 (L) 4.50 - 5.90 x10*6/uL    Hemoglobin 8.2 (L) 13.5 - 17.5 g/dL    Hematocrit 27.2 (L) 41.0 - 52.0 %    MCV 98 80 - 100 fL    MCH 29.4 26.0 - 34.0 pg    MCHC 30.1 (L) 32.0 - 36.0 g/dL    RDW 16.1 (H) 11.5 - 14.5 %    Platelets 142 (L) 150 - 450 x10*3/uL   Comprehensive metabolic panel   Result Value Ref Range    Glucose 86 74 - 99 mg/dL    Sodium 139 136 - 145 mmol/L    Potassium 5.0 3.5 - 5.3 mmol/L    Chloride 107 98 - 107 mmol/L    Bicarbonate 23 21 - 32 mmol/L    Anion Gap 14 10 - 20 mmol/L    Urea Nitrogen 48 (H) 6 - 23 mg/dL    Creatinine 3.35 (H) 0.50 - 1.30 mg/dL    eGFR 17 (L) >60 mL/min/1.73m*2    Calcium 8.3 (L) 8.6 - 10.3 mg/dL    Albumin 2.9 (L) 3.4 - 5.0 g/dL    Alkaline Phosphatase 59 33 - 136 U/L    Total Protein 5.8 (L) 6.4 - 8.2 g/dL    AST  28 9 - 39 U/L    Bilirubin, Total 0.4 0.0 - 1.2 mg/dL    ALT 13 10 - 52 U/L   Vancomycin   Result Value Ref Range    Vancomycin 15.3 5.0 - 20.0 ug/mL       ECG 12 lead    Result Date: 6/15/2024  Accelerated Junctional rhythm Low voltage QRS Nonspecific ST abnormality Abnormal ECG When compared with ECG of 30-MAY-2024 08:33, Junctional rhythm has replaced Atrial flutter See ED provider note for full interpretation and clinical correlation Confirmed by Chavo Cid ([id number]) on 6/15/2024 11:11:58 AM    US scrotum w doppler    Result Date: 6/14/2024  Interpreted By:  Julieta Callahan, STUDY: US SCROTUM WITH DOPPLER; 6/14/2024 4:37 pm   INDICATION: Swelling, pain, and erythema.   COMPARISON: None.   ACCESSION NUMBER(S): [id number]   ORDERING CLINICIAN: QUE GUZMAN   TECHNIQUE: Gray scale and color doppler imaging of the scrotum was performed with spectral waveform analysis. Arterial inflow and venous outflow documented. Duplex Doppler interrogation including color flow and spectral waveform analysis is performed.   FINDINGS: Right testis: 3.4 x 2.0 x 3.2 cm.   Left testis: 4.9 x 2.9 x 3.0 cm.   Epididymides: The epididymides are normal in size and echogenicity. There is a 2 x 3 x 3 mm cyst within right epididymal head.   The testes appear homogeneous with no intratesticular lesions. Ectasia of the rete testes on the right is present. Duplex Doppler interrogation of each testicle including color flow and spectral waveform analysis shows normal arterial and venous flow without torsion or orchitis.   There is a 3.6 x 6.0 x 3.0 encapsulated fluid collection anterior and superior to the right testicle containing low-level internal echoes and debris. This does not arise from the right epididymis. This may represent a scrotal wall abscess. There appears to be diffuse thickening of the scrotal wall.       3.0 x 3.6 x 6.0 cm encapsulated fluid collection noted which is extratesticular in location and is seen anterior and superior to  the right testicle. This contains low-level internal echoes and some thickening of its wall with internal debris. This may represent a scrotal abscess. Clinical correlation is necessary.   Ectasia of the rete testes on the right.   3 mm cyst right epididymal head.   No epididymal orchitis.   MACRO: None.   Signed by: Julieta Callahan 6/14/2024 4:59 PM Dictation workstation:   [id number]    XR chest 2 views    Result Date: 6/14/2024  Interpreted By:  Josh Soto, STUDY: XR CHEST 2 VIEWS;  6/14/2024 3:10 pm   INDICATION: Signs/Symptoms:Cough.   COMPARISON: 05/28/2024   ACCESSION NUMBER(S): [id number]   ORDERING CLINICIAN: WES ORTIZ   FINDINGS: Pacemaker leads are intact and properly positioned.       CARDIOMEDIASTINAL SILHOUETTE: Cardiomediastinal silhouette is normal in size and configuration.   LUNGS: There is complete opacification left hemithorax with volume loss and shift of the mediastinum to the left.   Since the prior examination further increase in density of the right lung base which may suggest atelectasis or infiltrate. Correlation with auscultation and inflammatory markers recommended. A small to moderate on right pleural effusion persists.   ABDOMEN: No remarkable upper abdominal findings.   BONES: No acute osseous changes.       1.  Complete opacification with volume loss left hemithorax. Increased density right lung base reflecting small to moderate right pleural effusion. Superimposed pneumonia can not be excluded.       MACRO: None   Signed by: Josh Soto 6/14/2024 3:36 PM Dictation workstation:   [id number]    CT abdomen pelvis wo IV contrast    Result Date: 6/14/2024  Interpreted By:  Schoenberger, Joseph, STUDY: CT ABDOMEN PELVIS WO IV CONTRAST;  6/14/2024 2:55 pm   INDICATION: Signs/Symptoms:Scrotal swelling, erythema, erythema extending up into the abdomen.   COMPARISON: 05/22/2024   ACCESSION NUMBER(S): [id number]   ORDERING CLINICIAN: WES ORTIZ   TECHNIQUE: CT of the abdomen  and pelvis was performed. Contiguous axial images were obtained at 3 mm slice thickness through the abdomen and pelvis. Coronal and sagittal reconstructions at 3 mm slice thickness were performed.  No intravenous or oral contrast agents were administered.   FINDINGS: Please note that the evaluation of vessels, lymph nodes and organs is limited without intravenous contrast.   LOWER CHEST: There is some persistent volume loss in the left lower lobe with persistent consolidation. There are few air bronchograms. There is rounded collection of fluid at the anterior aspect of this process which may relate to either necrotizing pneumonia or loculated pleural fluid or conceivably pulmonary abscess. However this is also unchanged from the prior. There is a however, a new moderate dependent layering right pleural effusion.   ABDOMEN:   LIVER: 2 cysts located in the superior aspect the left liver lobe are stable from the prior. No other focal abnormality.   BILE DUCTS: No dilation   GALLBLADDER: Dependent layering calcified gallstones. No wall thickening or pericholecystic fluid.   PANCREAS: Unremarkable   SPLEEN: Unremarkable   ADRENAL GLANDS: Bilateral adrenal glands appear normal.   KIDNEYS AND URETERS: The kidneys are normal in size and unremarkable in appearance.  No hydroureteronephrosis or nephroureterolithiasis is identified. 5 mm nonobstructing lower pole right renal stone unchanged.   PELVIS:   BLADDER: High choose 1   REPRODUCTIVE ORGANS: Unremarkable   BOWEL: The stomach is unremarkable.  Small bowel loops are normal in caliber without mural thickening. Colon is normal in caliber. There is a mobile cecum. There are multiple colonic diverticula. There is no convincing evidence for acute diverticulitis. Normal appendix.   VESSELS: There is no aneurysmal dilatation of the abdominal aorta. The IVC appears normal.   PERITONEUM/RETROPERITONEUM/LYMPH NODES: There is no free or loculated fluid collection, no free  intraperitoneal air. The retroperitoneum appears normal.  No abdominopelvic lymphadenopathy is present. The   ABDOMINAL WALL: There is soft tissues Amanda in the abdominal wall which is new from the prior. This is a set metric Roseann more pronounced on the left than on the right. This extends into the lower abdominal left-sided pannus. It does not appear to overtly involve the scrotum. There may be loculated right-sided hydrocele.   BONES: No suspicious osseous lesions are identified. 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       1.  The high significant development of abdominal wall edema in the subcutaneous tissues asymmetrically worse on the left. This does not appear to especially extend into the scrotum. 2. There is a new moderate right pleural effusion. 3. Persistent left basal consolidation with anterior oval shaped collection of fluid with similar appearance to prior other than lack of air bronchograms on this exam compared to the prior. Associated volume loss.     MACRO: None   Signed by: Joseph Schoenberger 6/14/2024 3:30 PM Dictation workstation:   [id number]    US renal complete    Result Date: 6/8/2024  Interpreted By:  Ryan Best and Weaver Jakob STUDY: US RENAL COMPLETE;  6/6/2024 5:51 pm   INDICATION: Signs/Symptoms:VIK.   COMPARISON: None.   ACCESSION NUMBER(S): [id number]   ORDERING CLINICIAN: ADRYAN SIMON   TECHNIQUE: Multiple images of the kidneys were obtained.   FINDINGS: RIGHT KIDNEY: The right kidney measures 11.8 cm in length. The renal cortical echogenicity is increased. Renal cortical thickness is mildly decreased. No hydronephrosis is present; no evidence of nephrolithiasis.   LEFT KIDNEY: The left kidney is poorly visualized, measuring approximately 11.8 cm. Renal cortical echogenicity is increased. No apparent hydronephrosis or nephrolithiasis.   BLADDER: The urinary bladder is unremarkable in appearance. Poorly visualized small  volume free fluid in the left upper quadrant.       Examination is somewhat limited due to patient body habitus, positioning. Within this limitation: Increased renal cortical echogenicity bilaterally which may relate to medical renal disease. No hydronephrosis or nephrolithiasis.   I personally reviewed the images/study and I agree with the findings as stated. This study was interpreted at [facility], [place], [place].   MACRO: None   Signed by: Ryan Urena 6/8/2024 3:02 AM Dictation workstation:   [id number]    ECG 12 Lead    Result Date: 5/31/2024  Atrial flutter with variable AV block with premature ventricular or aberrantly conducted complexes Low voltage QRS Abnormal ECG When compared with ECG of 28-MAY-2024 01:40, Atrial flutter has replaced Atrial fibrillation Nonspecific T wave abnormality, improved in Inferior leads Confirmed by Jovanny Loera ([id number]) on 5/31/2024 1:51:33 PM    Flexible Sigmoidoscopy    Result Date: 5/31/2024  Table formatting from the original result was not included. Impression Dark red blood visualized in the rectum. Blood was cleared without any active bleeding appreciated nor underlying mucosal changes. Few small and large, scattered diverticula with no inflammation in the sigmoid colon; no bleeding was identified. Diverticula were irrigated without any bleeding or stigmata of recent bleed appreciated. All observed locations appeared normal, including the sigmoid colon, rectosigmoid and rectum. No mucosal abnormalities. Normal on retroflexion. Green bilious stool proximal to the rectum, most notably in the proximal descending colon and transverse colon, without any blood, blood clots or hematin. Findings Dark red blood visualized in the rectum. Blood was cleared without any active bleeding appreciated nor underlying mucosal changes. Few small and large, scattered diverticula with no inflammation in the sigmoid colon; no bleeding was  identified. Diverticula were irrigated without any bleeding or stigmata of recent bleed appreciated. All observed locations appeared normal, including the sigmoid colon, rectosigmoid and rectum. No mucosal abnormalities. Normal on retroflexion. Green bilious stool proximal to the rectum, most notably in the proximal descending colon and transverse colon, without any blood, blood clots or hematin. Recommendation  Follow up with primary gastroenterologist  Follow up with inpatient GI consult service, Dr. Valenzuela and Dr. 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 The patient's tolerance of previous anesthesia has been reviewed. The risks and benefits of the procedure and the sedation options and risks were discussed with the patient. All questions were answered and informed consent obtained. Details of the Procedure The patient underwent no sedation. The patient's blood pressure, ECG, heart rate, level of consciousness, oxygen and respirations were monitored throughout the procedure. A digital rectal exam was performed. A perianal exam was performed. The scope was introduced through the anus and advanced to the transverse colon. Retroflexion was performed in the rectum. The quality of bowel preparation was evaluated using the [place] Bowel Preparation Scale with scores of: left colon = 3.  Bowel prep was not adequate. The patient experienced no blood loss. The procedure was not difficult. The patient tolerated the procedure well. There were no apparent adverse events. Events Procedure Events Event Event Time ENDO SCOPE IN TIME 5/24/2024  1:08 PM ENDO SCOPE OUT TIME 5/24/2024  1:26 PM Specimens No specimens collected Procedure Location [facility] [address] [address] [facility] [id number] [phone] Referring Provider Grayson Nichols, APRN- [address] [address] Procedure Provider Ale Archibald MD     Lower extremity venous duplex left    Result Date: 5/31/2024  Interpreted By:  Nathan Argueta,  Mone Randolph STUDY: [facility] US LOWER EXTREMITY VENOUS DUPLEX LEFT;  5/30/2024 3:58 pm   INDICATION: Signs/Symptoms:LLE swelling, eval for DVT.   COMPARISON: None.   ACCESSION NUMBER(S): [id number]   ORDERING CLINICIAN: ALEXANDRA AVILA   TECHNIQUE: Vascular ultrasound of the left lower extremity was performed. Real-time compression views as well as Gray scale, color Doppler and spectral Doppler waveform analysis was performed.   FINDINGS: Evaluation of the visualized portions of the left common femoral vein, proximal, mid, and distal femoral vein, and popliteal vein were performed.  Evaluation of the visualized portions of the  posterior tibial and peroneal veins were also performed. Evaluation of the calf veins limited due to edema.   Echogenic intraluminal material in the proximal greater saphenous vein with partial compressibility and color Doppler signal The evaluated veins demonstrate normal compressibility. There is intact venous flow demonstrating normal respiratory variability and normal augmentation of flow with calf compression. Therefore, there is no ultrasonographic evidence for deep vein thrombosis within the evaluated veins.       1.  No sonographic evidence for deep vein thrombosis within the evaluated veins  of the left lower extremity. 2. Nonocclusive thrombus of the proximal greater saphenous vein, a superficial vein. Recommend correlation with concern for superficial thrombophlebitis.   I personally reviewed the images/study and I agree with the findings as stated by Agustín Cheatham MD. This study was interpreted at [place], [place].   MACRO: None   Signed by: Nathan Argueta 5/31/2024 5:32 AM Dictation workstation:   [id number]    ECG 12 Lead    Result Date: 5/30/2024  Atrial flutter with variable AV block Rightward axis Low voltage QRS Nonspecific ST and T wave abnormality Abnormal ECG When compared with ECG of 16-MAY-2024 21:13, Significant changes have occurred Confirmed by Jovanny Loera ([id number]) on 5/30/2024 11:03:21 AM    US chest    Result Date: 5/29/2024  Interpreted By:  Dinh Plunkett, STUDY: US CHEST; 5/29/20243:53 pm   INDICATION: Signs/Symptoms:Thoracentesis (Left) diagnostic/therapeutic.   COMPARISON: None.   ACCESSION NUMBER(S): [id number]   ORDERING CLINICIAN: ALEXANDRA AVILA   TECHNIQUE: INTERVENTIONALIST(S): Dinh Plunkett   CONSENT: The patient/patient's POA/next of kin was informed of the nature of the proposed procedure. The purposes, alternatives, risks, and benefits were explained and discussed. All questions were answered and consent was obtained.   SEDATION: None   TIME OUT: A time out was performed immediately prior to procedure start with the interventional team, correctly identifying the patient name, date of birth, MRN, procedure, anatomy (including marking of site and side), patient position, procedure consent form, relevant laboratory and imaging test results, antibiotic administration, safety precautions, and procedure-specific equipment needs.   FINDINGS: The patient was placed in the supine position.   The thoracic space was examined with grey scale ultrasound, and an absence of free fluid was demonstrated on ultrasound. Thoracic images  were captured to demonstrate. A thoracentesis was not performed.       Absence of free fluid for procedure. A thoracentesis was not performed.   I personally performed and/or directly supervised this study and was present for the entire procedure.   Performed and dictated at [facility].   Signed by: Dinh Plunkett 5/29/2024 3:53 PM Dictation workstation:   [id number]    XR chest 1 view    Result Date: 5/28/2024  Interpreted By:  Jovany Nuñez, STUDY: XR CHEST 1 VIEW;  5/28/2024 8:03 am   INDICATION: Signs/Symptoms:Rule out infection.   COMPARISON: Chest radiograph dated 5/27/2024   ACCESSION NUMBER(S): [id number]   ORDERING CLINICIAN: ALEXANDRA AVILA   FINDINGS: AP radiograph of the chest   Pacemaker electrodes appear in adequate position. There is virtually complete opacification of the left thorax. The heart mediastinum are shifted to the left indicating volume loss. Compensatory hyperaeration of the right lung and pulmonary vascular shunting to the right lung is noted increased from previous study.         1. Virtually complete opacification of the left thorax with heart and mediastinum shifted to the left indicating volume loss 2. Compensatory pulmonary vascular shunting to the right lung       Signed by: Jovany Nuñez 5/28/2024 10:38 AM Dictation workstation:   [id number]    Electrocardiogram, 12-lead PRN ACS symptoms    Result Date: 5/28/2024  Atrial fibrillation with rapid ventricular response Nonspecific ST and T wave abnormality , probably digitalis effect Abnormal ECG When compared with ECG of 28-MAY-2024 01:39, (unconfirmed) No significant change was found Confirmed by Jovanny Loera ([id number]) on 5/28/2024 10:17:48 AM    FL modified barium swallow study    Result Date: 5/26/2024  Interpreted By:  Sohail Torres and Riley Laura STUDY: FL MODIFIED BARIUM SWALLOW STUDY;; 5/25/2024 9:06 am   INDICATION: Signs/Symptoms:r\o dysphagia.   COMPARISON: None.   ACCESSION  NUMBER(S): [id number]   ORDERING CLINICIAN: ALEXANDRA AVILA   TECHNIQUE: MBSS completed. Informed verbal consent obtained prior to completion of exam. Trials of thin, nectar thick,  puree, and regular solids given. Fluoroscopy time :  2 minutes.   SLP: Meri Hurley MS CCC/SLP Phone/Pager: Epic Amira   SPEECH FINDINGS: Reason for referral: Further assessment of oropharyngeal swallow Patient hx: Recent RLL PNA. S/s of aspiration w/3 oz Swallow Protocol Respiratory status: Nasal cannula Previous diet: Reg/thin   FINAL SPEECH RECOMMENDATIONS   Diet recommendations/feeding strategies: Solid Diet Recommendations : Easy to Chew Liquid Diet Recommendations: Thin Medication Administration: Single w/ sips of water, whole in puree as needed   Safe Swallow Strategies/Guidelines: Only present PO intake when awake and alert Supervision/assist w/ PO intake to assure adherence to safe swallow strategies Upright positioning Slow rate/small boluses   Follow-up speech therapy recommended: Yes.   Short term goals: Pt will tolerate least restrictive diet with adequate oral phase and no s/s of aspiration. Date initiated: 5/25/24 Status: Goal initiated   Pt will adhere to safe swallow strategies during PO intake with > 90% accuracy independently. Date initiated: 5/25/24 Status: Goal initiated   Education provided: Yes.     Mechanics of the swallow summary: *Oral phase: Mild deficits.   Adequate bolus acceptance.  Prolonged mastication and bolus formation/transit w/ regular solids.  No oral residue.   *Pharyngeal phase: Mild deficits.   Incomplete epiglottic inversion, may be related to presence of NGT. Timely swallow initiation.  Adequate hyolaryngeal elevation/excursion.  Trace penetration x1 with consecutive boluses of thin liquids related to incomplete airway protection.  Ejected upon swallow completion.  No other penetration and no aspiration with any other consistency assessed.  Mild residue at the valleculae following the swallow.   Largely cleared with liquid wash.  Mildly reduced distention of PES.     SLP impressions with severity rating: Mild oropharyngeal dysphagia characterized by prolonged mastication/bolus formation and mild residue at the valleculae following the swallow.   Thin Liquids (MBSS) Rosenbek's Penetration Aspiration Scale, Thin Liquids (MBSS): 2. PENETRATION that CLEARS - contrast enter airway, above vocal cords, no residue     Nectar Thick Liquids (MBSS) Rosenbek's Penetration Aspiration Scale, Nectar thick liquids (MBSS): 1. NO ASPIRATION & NO PENETRATION - no aspiration, contrast does not enter airway       Purees (MBSS) Rosenbek's Penetration Aspiration Scale, Purees (MBSS): 1. NO ASPIRATION & NO PENETRATION - no aspiration, contrast does not enter airway     Solids (MBSS) Rosenbek's Penetration Aspiration Scale, Solids (MBSS): 1. NO ASPIRATION & NO PENETRATION - no aspiration, contrast does not enter airway     Speech Therapy section of this report signed by Meri Hurley MS CCC/SLP on 5/25/2024 at 10:17 am.   RADIOLOGY FINDINGS: Nasoenteric tube is partially visualized. No evidence of acute osseous abnormality of the cervical spine. Patient is edentulous.   Radiology section of this report signed by Dr. Torres.       1. No radiographic evidence of acute process in the neck. 2. Please refer to speech pathology report for additional findings.   MACRO: None   Signed by: Sohail Torres 5/26/2024 3:51 PM Dictation workstation:   [id number]    XR abdomen 1 view    Result Date: 5/25/2024  Interpreted By:  Marcie Shah, STUDY: XR ABDOMEN 1 VIEW; 5/25/2024 5:45 pm   INDICATION: Signs/Symptoms:ngt placed.   COMPARISON: Radiograph dated 05/25/2024, 2:33 p.m.   ACCESSION NUMBER(S): [id number]   ORDERING CLINICIAN: ALEXANDRA AVILA   FINDINGS: Single-view of the abdomen. The pelvis is not included. Enteric tube is in place with the tip projecting over the stomach. Positive contrast is identified in the gastric  fundus. Prominent loops of bowel throughout the visualized upper abdomen. Nonobstructive bowel gas pattern.  Limited evaluation of pneumoperitoneum on supine imaging, however no gross evidence of free air is noted.   Extensive consolidation in visualized portion of the left lung.   Osseous structures demonstrate no acute bony changes.       1.  Enteric tube projecting over the proximal stomach. 2. Again seen multiple prominent loops of bowel throughout the upper abdomen. Correlation with ileus. Recommend follow-up radiograph.   Signed by: Marcie Rodriguez 5/25/2024 5:54 PM Dictation workstation:   [id number]    XR abdomen 1 view    Result Date: 5/25/2024  Interpreted By:  Marcie Shah, STUDY: XR ABDOMEN 1 VIEW; 5/25/2024 3:06 pm   INDICATION: Signs/Symptoms:distended abdomen, assess for dilated bowel loops.   COMPARISON: Radiograph dated 05/21/2024   ACCESSION NUMBER(S): [id number]   ORDERING CLINICIAN: ALEXANDRA AVILA   FINDINGS: Views of the abdomen and pelvis. What is presumed to be enteric tube is projecting over the lower mediastinum. Positive contrast is identified in the expected location of the stomach. There is also positive contrast throughout the loops of bowel in the lower abdomen. Prominent loops of colon. Limited evaluation of pneumoperitoneum on supine imaging, however no gross evidence of free air is noted.   Consolidative opacities in left lung.   Osseous structures demonstrate no acute bony changes.       1.  Prominent loops of colon which can be due to ileus. Recommend follow-up radiograph.   Signed by: Marcie Rodriguez 5/25/2024 5:33 PM Dictation workstation:   [id number]    XR chest 1 view    Result Date: 5/23/2024  Interpreted By:  Jovany Nuñez and Baker Zachary STUDY: XR CHEST 1 VIEW; ;  5/22/2024 6:45 pm   INDICATION: Signs/Symptoms:Worsening tachypnea, confusion.   COMPARISON: Chest radiograph on 05/21/2024.   ACCESSION NUMBER(S): [id number]   ORDERING CLINICIAN:  SUN CHAMPAGNE   FINDINGS: Single AP view of the chest.   Right subclavian vein approach pacemaker/AICD in place with leads projecting over the right atrium and ventricle. Enteric tube coursing below diaphragm tip overlying the expected position of the gastric body.   The cardiomediastinal silhouette is obscured, similar to prior exam.   Persistent near-complete opacification of the left hemithorax, with mild interval increase in air bronchograms within the left upper lung zone. Redemonstration of patchy right infrahilar airspace opacities and interstitial prominence on the right. No right-sided pleural effusion or pneumothorax.   No acute osseous abnormality.       1. Persistent near-complete opacification of the left hemithorax, with mild interval increase in air bronchograms within the left upper lung zone. 2. Persistent patchy right infrahilar airspace opacities and interstitial prominence throughout the right lung, which may represent aspiration/pneumonia in the appropriate clinical setting. 3. Medical devices as above.   I personally reviewed the images/study and I agree with the findings as stated by Dr. Raymond Armstrong M.D. This study was interpreted at [facility] [facility], [place].   MACRO: None   Signed by: Jovany Nuñez 5/23/2024 7:32 AM Dictation workstation:   [id number]    CT chest abdomen pelvis wo IV contrast    Result Date: 5/23/2024  Interpreted By:  Sohail Torres,  and Jeff Ibrahim STUDY: CT CHEST ABDOMEN PELVIS WO CONTRAST;  5/22/2024 12:15 pm   INDICATION: Signs/Symptoms:c/f sepsis.   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 N/V/D with cof[employer] concernign for UPI GIB and ongoing fevers.   COMPARISON: CT chest/abdomen/pelvis 05/18/2024   ACCESSION NUMBER(S): [id number]   ORDERING CLINICIAN: SUN CHAMPAGNE   TECHNIQUE: CT of the  chest, abdomen and pelvis was performed. Contiguous axial images were obtained at 3 mm slice thickness through the chest, abdomen and pelvis. Coronal and sagittal reconstructions at 3 mm slice thickness were performed.  No intravenous contrast was administered; positive oral contrast was given.   FINDINGS: Please note that the study is limited without intravenous contrast.   Respiratory motion artifact.   CHEST:   LUNG/PLEURA/LARGE AIRWAYS: Trachea and bilateral mainstem bronchi are patent.   Again seen complete opacification of the left hemithorax with air bronchograms consistent with significant collapse of the left lung.   There is left pleural thickening (example series 2, image 38) with a focus of left posterior chest wall extension at the level of the 9th-10th (series 2, image 71) and 10th-11th (series 2, image 82) rib spaces, similar to prior, findings consistent with patient's known mesothelioma.   Similar small loculated pleural effusion measuring 7.1 x 4.1 x 2.3 cm (series 2, image 66 and series 900, image 62).   Interval improvement of right lower lobe ill-defined patchy infiltrate (series 4, image 143). Slight interval increase in right trace pleural effusion. No new focal consolidations. No evidence of pneumothorax.   VESSELS: The previously noted ill-defined hypodensity in the left pulmonary artery is not well evaluated on this exam due to beam hardening artifact, positioning of patient's arms, and lack of venous contrast.   Dilated main pulmonary artery measuring 3.8 cm (series 2, image 40), similar to prior. Ectatic ascending aorta measuring 4.1 cm, similar to prior.  Mild to moderate coronary artery calcifications are present.   HEART: The heart is normal in size.  Trace pericardial fluid, similar to prior. Right-sided subclavian approach dual-chambercardiac ICD/pacemaker, with the leads in the right atrium and right ventricle.   MEDIASTINUM AND ANDREA: Multiple prominent mediastinal and perihilar  lymph nodes similar to prior. For example:   1.3 cm subcarinal lymph node (series 2, image 48).   1 cm perihilar lymph node (series 2, image 33).   Enteric tube courses through the esophagus and terminates in the body of the stomach. Otherwise otherwise esophagus is within normal limits.   CHEST WALL AND LOWER NECK: Right chest wall cardiac pacemaker as described above. The visualized thyroid gland appears within normal limits.   ABDOMEN:   LIVER: The liver is normal in size. Similar hypoattenuating lesions with the largest measuring 2 cm segment 4A (series 2, image 71).   BILE DUCTS: The bile ducts are not dilated.   GALLBLADDER: The gallbladder is not distended. Calcified stones are noted.   PANCREAS: The pancreas appears unremarkable.   SPLEEN: Within normal limits.   ADRENAL GLANDS: Bilateral adrenal glands appear normal.   KIDNEYS AND URETERS: Bilateral mildly atrophic kidneys with mild perinephric stranding similar to prior. There is a 0.6 cm left midpole nonobstructing renal calculi. No hydroureteronephrosis. No right nephroureterolithiasis.   PELVIS:   BLADDER: The urinary bladder is underdistended with Forrest catheter in place. There is air in the urinary bladder likely from the Forrest.   REPRODUCTIVE ORGANS: No pelvic masses.   BOWEL: NG tube body of the stomach. Otherwise, the stomach is unremarkable. The small is normal in caliber and demonstrate no wall thickening. Proximal sigmoid diverticulosis with interval improvement in the mild wall thickening and adjacent fat stranding involving the proximal sigmoid colon (series 902, image 93). Interval insertion of rectal tube.   VESSELS: Mild atherosclerosis of the abdominal aorta and its branching vessels. There is no aneurysmal dilatation of the abdominal aorta..   Interval flattening of the inferior vena cava (image 2, image 121)/   PERITONEUM/RETROPERITONEUM/LYMPH NODES: No ascites or free air, no fluid collection.  The retroperitoneum appears unremarkable.   No enlarged mesenteric lymph nodes.   ABDOMINAL WALL: Within normal limits.   BONES: No suspicious osseous lesions are present. Degenerative discogenic disease is noted in the lower thoracic and lumbar spine most severe at L3-L4, L4-L5, and L5-L6..       Chest 1. Interval improvement of the right lower lobe patchy infiltrate. 2. Again seen left lung collapse, left pleural thickening with invasion into the left posterior chest wall, and small left loculated pleural effusion consistent with patient's known mesothelioma and is unchanged when compared to prior CT from 05/18/2024. 3. Unchanged prominent mediastinal and perihilar lymph nodes. 4. Previously noted left pulmonary artery thrombus is not well evaluated on this exam due to beam hardening artifact, positioning of patient's arms, and lack of intravenous contrast.   Abdomen-Pelvis 1. Interval flattening of the inferior vena cava which may be seen with hypovolemia. Recommend correlation with patient's fluid volume status. 2. Improved proximal sigmoid diverticulitis. 3. Additional unchanged findings as noted above.   I personally reviewed the images/study and I agree with the findings as stated by Patrice Aguilar DO, PGY-2. This study was interpreted at [facility], [place], [place].   MACRO: None   Signed by: Sohail Torres 5/23/2024 12:22 AM Dictation workstation:   [id number]    Transthoracic Echo (TTE) Complete    Result Date: 5/21/2024   [facility], [address] [place] [address]                Tel [phone] and Fax [phone] TRANSTHORACIC ECHOCARDIOGRAM REPORT  Patient Name:      ZAKIYA LAWSON       Reading Physician:    [id number] Delvis Miller MD Study Date:        5/21/2024            Ordering Provider:    [id number] CHAMPAGNE MRN/PID:           [id number]              Fellow: Accession#:        [id number]         Nurse: Date of Birth/Age: 1935 / 88 years Sonographer:          Dionisio Braga                                                               RDCS Gender:            M                    Additional Staff: Height:            172.72 cm            Admit Date: Weight:            109.77 kg            Admission Status:     Inpatient -                                                               Routine BSA / BMI:         2.22 m2 / 36.80      Encounter#:           [id number]                    kg/m2                                         Department Location:  [place] [address] Blood Pressure: 96 /57 mmHg Study Type:    TRANSTHORACIC ECHO (TTE) COMPLETE Diagnosis/ICD: Unspecified atrial fibrillation-I48.91; Hypertensive heart                disease with heart failure-I11.0 Indication:    hypoxic resp failure w/ new o2 requirement c/f HF CPT Code:      Echo Complete w Full Doppler-[id number] Patient History: Pertinent History: PAD, afib/flutter, hx DVT on warfarin, HTN, CAD s/p stent,                    mitral regurg, HLD,JOVANNI, basal cell ca on face, s/p removal                    and radiation of malignant mesothelioma, S/p L VATS with                    decort 5/2022. Study Detail: The following Echo studies were performed: 2D, M-Mode, color flow               and Doppler. Technically challenging study due to body habitus,               patient lying in supine position, poor acoustic windows and               prominent lung artifact. Definity used as a contrast agent for               endocardial border definition. Total contrast used for this               procedure was 2.0 mL via IV push. Unable to obtain suprasternal               notch view.  PHYSICIAN INTERPRETATION: Left Ventricle: The left ventricular systolic function is hyperdynamic, with an estimated ejection fraction of 70-75%. The patient is in atrial fibrillation which may influence the estimate of left ventricular  function and transvalvular flows. There are no regional wall motion abnormalities. The left ventricular cavity size is normal. Left ventricular diastolic filling was not assessed. Left Atrium: The left atrium was not well visualized. Right Ventricle: The right ventricle was not well visualized. Unable to determine right ventricular systolic function. Right Atrium: The right atrium was not well visualized. Aortic Valve: The aortic valve is trileaflet. There is trivial aortic valve regurgitation. The peak instantaneous gradient of the aortic valve is 13.2 mmHg. Mitral Valve: The mitral valve is normal in structure. There is mild mitral annular calcification. There is trace mitral valve regurgitation. Tricuspid Valve: The tricuspid valve was not well visualized. Tricuspid regurgitation was not well visualized. Tricuspid regurgitation was not assessed. The Doppler estimated RVSP is mildly elevated at 36.6 mmHg. Pulmonic Valve: The pulmonic valve is not well visualized. There is physiologic pulmonic valve regurgitation. Pericardium: There is a trivial pericardial effusion. Aorta: The aortic root is abnormal. The aortic root appears moderately dilated and measures 4.50 cm. The Asc Ao is 3.70 cm. There is mild dilatation of the ascending aorta. Systemic Veins: The inferior vena cava appears to be of normal size. There is IVC inspiratory collapse greater than 50%. In comparison to the previous echocardiogram(s): Compared with study from 5/3/2022, the previous study was a MARY during a MARY/DCCV and comparison is limited.  CONCLUSIONS:  1. Poorly visualized anatomical structures due to suboptimal image quality.  2. Left ventricular systolic function is hyperdynamic with a 70-75% estimated ejection fraction.  3. The patient is in atrial fibrillation which may influence the estimate of left ventricular function and transvalvular flows.  4. Mildly elevated RVSP.  5. Moderately dilated aortic root. QUANTITATIVE DATA SUMMARY: 2D  MEASUREMENTS:                          Normal Ranges: Ao Root d:     4.50 cm   (2.0-3.7cm) LAs:           2.50 cm   (2.7-4.0cm) IVSd:          0.80 cm   (0.6-1.1cm) LVPWd:         0.80 cm   (0.6-1.1cm) LVIDd:         4.10 cm   (3.9-5.9cm) LVIDs:         2.20 cm LV Mass Index: 43.9 g/m2 LV % FS        46.3 % AORTA MEASUREMENTS:                    Normal Ranges: Asc Ao, d: 3.70 cm (2.1-3.4cm) LV SYSTOLIC FUNCTION BY 2D PLANIMETRY (MOD):                     Normal Ranges: EF-A4C View: 73.6 % (>=55%) EF-A2C View: 80.4 % EF-Biplane:  78.8 % LV DIASTOLIC FUNCTION:                     Normal Ranges: MV Peak E: 1.09 m/s (0.7-1.2 m/s) MV DT:     245 msec (150-240 msec) MITRAL VALVE:                 Normal Ranges: MV DT: 245 msec (150-240msec) AORTIC VALVE:                          Normal Ranges: AoV Vmax:      1.82 m/s  (<=1.7m/s) AoV Peak P.2 mmHg (<20mmHg) LVOT Max Philipp:  1.42 m/s  (<=1.1m/s) LVOT VTI:      18.80 cm LVOT Diameter: 2.00 cm   (1.8-2.4cm) AoV Area,Vmax: 2.45 cm2  (2.5-4.5cm2)  RIGHT VENTRICLE: TAPSE: 15.8 mm RV s'  0.18 m/s TRICUSPID VALVE/RVSP:                             Normal Ranges: Peak TR Velocity: 2.90 m/s RV Syst Pressure: 36.6 mmHg (< 30mmHg) PULMONIC VALVE:                      Normal Ranges: PV Max Philipp: 0.9 m/s  (0.6-0.9m/s) PV Max PG:  3.2 mmHg  [id number] Delvis Mliler MD Electronically signed on 2024 at 5:46:15 PM  ** Final **     XR abdomen 1 view    Result Date: 2024  Interpreted By:  Jovany Nuñez, STUDY: XR ABDOMEN 1 VIEW;  2024 12:58 pm   INDICATION: Signs/Symptoms:post ngt.   COMPARISON: One-view abdomen 2024 13 a.m.   ACCESSION NUMBER(S): [id number]   ORDERING CLINICIAN: SUN CHAMPAGNE   FINDINGS: One-view abdomen   An enteric tube is positioned within the region of the gastric fundus several cm below the expected gastroesophageal junction. There is a predominantly fluid-filled small bowel and colon pattern. There is less gastric distention with air.    Opacification of the left thorax and multiple air bronchograms are noted.       1.  The enteric tube is positioned within the gastric fundus 2. Advancement of the tube would be recommended for more optimal positioning 3. Predominantly fluid-filled small bowel and colon pattern 4. Opacification of the visualized left thorax and multiple air bronchograms within the left lung   MACRO: None   Signed by: Jovany Nuñez 5/21/2024 2:19 PM Dictation workstation:   [id number]    XR abdomen 1 view    Result Date: 5/21/2024  Interpreted By:  Jovany Nuñez, STUDY: XR ABDOMEN 1 VIEW;  5/21/2024 11:13 am   INDICATION: Signs/Symptoms:ABD DISTENTION.   COMPARISON: None.   ACCESSION NUMBER(S): [id number]   ORDERING CLINICIAN: SUN CHAMPAGNE   FINDINGS: The stomach is distended with air. There is a predominantly fluid-filled small bowel and colon pattern. Gas is demonstrated within the colon and distal ileum. The left ventricular pacemaker electrode appears in adequate position. The left ventricle appears mildly enlarged. Osseous and articular anatomy is normal for age.       1.  The stomach is moderately distended with air otherwise, nonspecific predominantly fluid-filled bowel pattern   MACRO: None   Signed by: Jovany Nuñez 5/21/2024 2:17 PM Dictation workstation:   [id number]    XR chest 1 view    Result Date: 5/21/2024  Interpreted By:  Jovany Nuñez and Calo Sean-Matthew STUDY: XR CHEST 1 VIEW;  5/21/2024 6:14 am   INDICATION: Signs/Symptoms:SOB.   COMPARISON: Chest radiograph 05/16/2024 CT chest 05/18/2024   ACCESSION NUMBER(S): [id number]   ORDERING CLINICIAN: SUN CHAMPAGNE   FINDINGS: AP radiograph of the chest was provided.   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   CARDIOMEDIASTINAL SILHOUETTE: Cardiomediastinal silhouette is stable in size with complete obscuration of the left cardiomediastinal border.   LUNGS: Persistent  near-complete opacification of the left hemithorax with interval reduced air bronchograms compared to prior. Persistent patchy right infrahilar airspace opacities are noted. Prominent perihilar and interstitial lung markings are noted on the right. No consolidation, effusion, or pneumothorax on the right.   ABDOMEN: No remarkable upper abdominal findings.   BONES: No acute osseous changes.       1. Virtually complete opacification of the left hemithorax with interval reduced air bronchograms. 2. Persistent patchy right infrahilar airspace opacities which may reflect aspiration changes and/or pneumonia in the appropriate clinical setting.   I personally reviewed the images/study and I agree with the findings as stated by Matthew Lindo MD. This study was interpreted at [facility], [place], [place].   MACRO: None   Signed by: Jovany Nuñez 5/21/2024 11:26 AM Dictation workstation:   [id number]    XR abdomen 1 view    Result Date: 5/20/2024  Interpreted By:  Sofya Ahumada, STUDY: XR ABDOMEN 1 VIEW;  5/19/2024 10:42 pm   INDICATION: Signs/Symptoms:abdominal distension.   COMPARISON: CT: 05/18/2024   ACCESSION NUMBER(S): [id number]   ORDERING CLINICIAN: SUN CHAMPAGNE   FINDINGS: Nonobstructive bowel gas pattern. Limited evaluation of pneumoperitoneum on supine imaging, however no gross evidence of free air is noted.   Whiteout throughout the left hemithorax.   Osseous structures demonstrate no acute bony changes.       1.  Nonspecific and nonobstructive gas pattern.   MACRO: None   Signed by: Sofya Ahumada 5/20/2024 8:47 AM Dictation workstation:   [id number]    ECG 12 lead    Result Date: 5/18/2024  Sinus tachycardia with 1st degree AV block Lateral infarct , age undetermined Inferior infarct (cited on or before 10-DEC-2023) Prolonged QT Abnormal ECG When compared with ECG of 10-DEC-2023 11:23, Significant changes have occurred See ED provider note for full interpretation  and clinical correlation Confirmed by Renee Dotson ([id number]) on 5/18/2024 11:02:23 AM    CT chest abdomen pelvis w IV contrast    Result Date: 5/18/2024  Interpreted By:  Collins Conde, STUDY: CT CHEST ABDOMEN PELVIS W IV CONTRAST;  5/18/2024 8:38 am   INDICATION: Signs/Symptoms:abd pain, LLQ TTP.   COMPARISON: Chest CT scan 12/10/2020. Chest and abdomen CT scan dated 06/14/2023.   ACCESSION NUMBER(S): [id number]   ORDERING CLINICIAN: THEA MURRAY   TECHNIQUE: CT of the chest, abdomen, and pelvis was performed.  Contiguous axial images were obtained at 3 mm slice thickness through the chest, abdomen and pelvis. Coronal and sagittal reconstructions at 3 mm slice thickness were performed. 75 ml of contrast Omnipaque were administered intravenously without immediate complication.   FINDINGS: CHEST:   LUNG/PLEURA/LARGE AIRWAYS: Significant collapse of the left lung with heterogenous appearance, pleural thickening and small loculated pleural fluid measuring 7.8 cm consistent with the patient's known mesothelioma. Focus of left posterior chest wall extension (series 201, image 77 and image 89) at the level of the 9th 10th and 10th 11th rib spaces   Right lower lobe ill-defined patchy infiltrate measuring 2.3 x 1.4 cm. Trace right-sided pleural effusion with surrounding atelectasis.   Redemonstrated left hilar ill-defined enhancing fullness appears extending to the left pulmonary trunk possibly tumor thrombus and felt less likely bland thrombus.   VESSELS: The aforementioned left hilar ill-defined soft tissue fullness appears extending to the left pulmonary trunk with almost 50% occlusion. Mild coronary artery calcifications   HEART: The heart is normal in size.  Trace pericardial effusion. Right chest wall pacemaker noted.   MEDIASTINUM AND ANDREA: Multiple prominent mediastinal lymph nodes in the largest in the subcarinal region measuring 1.6 cm. The esophagus is normal.   CHEST WALL AND LOWER NECK: The soft  tissues of the chest wall demonstrate no gross abnormality. The visualized thyroid gland appears within normal limits.   ABDOMEN:   LIVER: Normal size. Normal contour. Stable hypoattenuating lesions likely benign in the largest segment 4A measuring 2 cm.   BILE DUCTS: The intrahepatic and extrahepatic ducts are not dilated.   GALLBLADDER: Cholelithiasis.   PANCREAS: The pancreas appears unremarkable without evidence of ductal dilatation or masses.   SPLEEN: The spleen is normal in size.   ADRENAL GLANDS: Bilateral adrenal glands appear normal.   KIDNEYS AND URETERS: The kidneys are normal in size and enhance symmetrically.  No hydroureteronephrosis. Nonobstructive left midpole 0.6 cm calculus.   PELVIS:   BLADDER: Underdistended with Forrest catheter in place.   REPRODUCTIVE ORGANS: No pelvic masses.   BOWEL: The stomach, small and large bowel are normal in appearance without wall thickening or obstruction. Proximal sigmoid diverticulosis with mild wall thickening could be related to episodes of underlying mild diverticulitis. No ascites. Pneumoperitoneum.     VESSELS: Mild vascular calcifications and atherosclerotic changes.   PERITONEUM/RETROPERITONEUM/LYMPH NODES: No enlarged intra abdominopelvic lymphadenopathy.   BONE AND SOFT TISSUE: Multilevel degenerative spine changes and facet joint disease. Trace soft tissue thickening at the level of the umbilicus.       CHEST: 1. Significant collapse of the left lung with heterogenous appearance, pleural thickening and small loculated pleural fluid measuring 7.8 cm consistent with the patient's known mesothelioma which have worsened from the prior CT scan dated 06/14/2023 and grossly unchanged from 12/10/2023 unenhanced CT scan allowing for differences in techniques. Focus of new left posterior chest wall invasion noted at the level of 9th 10th and 10th 11th rib spaces. 2. Right lower lobe ill-defined patchy infiltrate measuring 2.3 x 1.4 cm. Trace right-sided pleural  effusion with surrounding atelectasis. 3. Redemonstrated left hilar ill-defined enhancing fullness appears extending to the left pulmonary trunk likely tumor thrombus and felt less likely bland thrombus which has significantly worsened from 06/14/2023 CT scan. 4. Mildly enlarged multiple mediastinal lymph nodes in the largest in the subcarinal region measuring 1.6 cm, grossly unchanged from prior.   ABDOMEN-PELVIS: 1. Proximal sigmoid diverticulosis with mild wall thickening could be related to episodes of underlying mild uncomplicated diverticulitis 2. Other ancillary findings are unchanged.   The significant results of the study were relayed by me to and acknowledged by Janes Nichols NP on 05/18/2024 at 10:40 a.m. over the phone.   MACRO: None   Signed by: Collins Conde 5/18/2024 10:47 AM Dictation workstation:   [id number]    US renal complete    Result Date: 5/17/2024  Interpreted By:  Karel Lowry, STUDY: US RENAL COMPLETE;  5/17/2024 8:02 pm   INDICATION: Signs/Symptoms:Urinary retention?.   COMPARISON: CT abdomen pelvis 05/16/2024   ACCESSION NUMBER(S): [id number]   ORDERING CLINICIAN: KEMI AUGUSTINE   TECHNIQUE: Grayscale and color Doppler sonographic images of the kidneys.   FINDINGS:     RIGHT KIDNEY: The right kidney measures 11.3 cm in length. Diffuse renal cortical thinning. No hydronephrosis. No renal calculus. No perinephric fluid.   LEFT KIDNEY: The left kidney measures 11.2 cm in length. Diffuse renal cortical thinning. No hydronephrosis. There is a 1 cm nonobstructing left renal calculus. No perinephric fluid.   URINARY BLADDER: Urinary bladder is decompressed, limiting its evaluation, with Forrest catheter in place.       1. Diffuse bilateral renal cortical thinning in keeping with chronic renal disease. No hydronephrosis. 2. Nonobstructing 1 cm left renal calculus. 3. Decompressed urinary bladder due to Forrest catheter, limiting its evaluation.   MACRO: None.   Signed by: Karel Lowry  5/17/2024 10:07 PM Dictation workstation:   [id number]    CT abdomen pelvis w IV contrast    Result Date: 5/17/2024  STUDY: CT Abdomen and Pelvis with IV Contrast; 05/16/2024 10:27 pm INDICATION: Abdominal pain.  Distension. COMPARISON: CT CAP 06/14/2023 and 03/21/2023. ACCESSION NUMBER(S): [id number] ORDERING CLINICIAN: KAILA HALEY TECHNIQUE: CT of the abdomen and pelvis was performed.  Contiguous axial images were obtained at 3 mm slice thickness through the abdomen and pelvis. Coronal and sagittal reconstructions at 3 mm slice thickness were performed.  Omnipaque 350, 75 mL was administered intravenously.  FINDINGS: LOWER CHEST: Cardiac size is enlarged with pacer leads in the right atrium and right ventricle.  Moderate calcified coronary plaque is noted.  Mild calcified plaque is seen in the included descending thoracic aorta. Thoracic aorta is not aneurysmal.  There is complete collapse and consolidation of the left lung at the left lung base with loculated effusion seen at the left lung base.  Increased AP diameter of the thorax suggests chronic changes of COPD.  Respiratory motion limits assessment in the lung bases and upper abdomen.  Small sliding-type hiatal hernia is noted.  ABDOMEN:  LIVER: Simple fluid density cysts are noted in the dome of the liver along with subcentimeter hypodensities in the liver which are too small to definitively characterize but statistically most likely represent simple cysts or hemangiomas.  No hepatomegaly.   BILE DUCTS: No intrahepatic or extrahepatic biliary ductal dilatation.  GALLBLADDER: Gallbladder contains gallstones in the gallbladder neck but is otherwise unremarkable.  STOMACH: No abnormalities identified.  PANCREAS: No masses or ductal dilatation.  SPLEEN: No splenomegaly or focal splenic lesion.  ADRENAL GLANDS: No thickening or nodules.  KIDNEYS AND URETERS: Kidneys are normal in location.  Moderate bilateral renal cortical thinning is seen with mild  bilateral perinephric stranding. Nonobstructing 6 mm calculus is seen in the mid left kidney.  No ureteral calculi.  PELVIS:  BLADDER: Urinary bladder is decompressed by Forrest catheter, limiting assessment.   REPRODUCTIVE ORGANS: No abnormalities identified.  BOWEL: Diverticulosis is noted throughout the descending colon and sigmoid colon.  There is subtle fat stranding adjacent to the proximal sigmoid colon in the left upper pelvis which may represent mild acute diverticulitis.  Appendix is not definitively identified.  Terminal ileum is unremarkable.   VESSELS: Mild calcified atheromatous plaque is seen in the abdominal aorta and iliac arteries.  No aneurysm.  PERITONEUM/RETROPERITONEUM/LYMPH NODES: No free fluid.  No pneumoperitoneum. No lymphadenopathy.  ABDOMINAL WALL: No abnormalities identified. SOFT TISSUES: No abnormalities identified.  BONES: No acute fracture or aggressive osseous lesion.  Generalized osseous demineralization is noted.  There is a mild levoconvex curvature of the lumbar spine centered at L4.  Moderate multilevel disc disease is seen in the spine.  There is moderate bilateral mid and lower lumbar facet arthrosis.    1.  Subtle inflammatory change along the proximal sigmoid colon which may represent a low-grade acute diverticulitis.  No definite perforation or abscess. 2.  Complete collapse and consolidation of the left lung with a loculated effusion at the left lung base, unchanged compared to the prior chest CT and likely consistent with patient's reported mesothelioma, possibly chronic post treatment change. 3.  Cardiomegaly with chronic changes suggesting COPD. 4.  Cholelithiasis. 5.  Moderate bilateral renal atrophy with nonobstructing 6 mm left renal calculus. 6.  Additional chronic changes as described. Signed by Lucas Owen    XR chest 1 view    Result Date: 5/16/2024  STUDY: Chest Radiograph;  5/16/2024 9:40 PM INDICATION: Weakness. COMPARISON: 12/10/2023 XR Chest. ACCESSION  NUMBER(S): [id number] ORDERING CLINICIAN: KAILA HALEY TECHNIQUE:  Frontal chest was obtained at 21:40 hours. FINDINGS: CARDIOMEDIASTINAL SILHOUETTE: Cardiomediastinal silhouette is normal in size and configuration.  LUNGS: Complete opacification left hemithorax present.  This appears chronic likely related to volume loss and chronic consolidation.  This appears similar prior exams.  ABDOMEN: No remarkable upper abdominal findings.  BONES: No acute osseous changes.    Complete opacification left hemithorax likely related to volume loss and chronic consolidation. Signed by Andrade Angel MD

## 2024-06-15 NOTE — CARE PLAN
Problem: Pain - Adult  Goal: Verbalizes/displays adequate comfort level or baseline comfort level  Outcome: Progressing     Problem: Safety - Adult  Goal: Free from fall injury  Outcome: Progressing     Problem: Discharge Planning  Goal: Discharge to home or other facility with appropriate resources  Outcome: Progressing     Problem: Chronic Conditions and Co-morbidities  Goal: Patient's chronic conditions and co-morbidity symptoms are monitored and maintained or improved  Outcome: Progressing     Problem: Pain  Goal: Takes deep breaths with improved pain control throughout the shift  Outcome: Progressing  Goal: Turns in bed with improved pain control throughout the shift  Outcome: Progressing  Goal: Walks with improved pain control throughout the shift  Outcome: Progressing  Goal: Performs ADL's with improved pain control throughout shift  Outcome: Progressing  Goal: Participates in PT with improved pain control throughout the shift  Outcome: Progressing  Goal: Free from opioid side effects throughout the shift  Outcome: Progressing  Goal: Free from acute confusion related to pain meds throughout the shift  Outcome: Progressing     Problem: Skin  Goal: Decreased wound size/increased tissue granulation at next dressing change  Outcome: Progressing  Goal: Participates in plan/prevention/treatment measures  Outcome: Progressing  Goal: Prevent/manage excess moisture  Outcome: Progressing  Goal: Prevent/minimize sheer/friction injuries  Outcome: Progressing  Goal: Promote/optimize nutrition  Outcome: Progressing  Goal: Promote skin healing  Outcome: Progressing     Problem: Fall/Injury  Goal: Not fall by end of shift  Outcome: Progressing  Goal: Be free from injury by end of the shift  Outcome: Progressing  Goal: Verbalize understanding of personal risk factors for fall in the hospital  Outcome: Progressing  Goal: Verbalize understanding of risk factor reduction measures to prevent injury from fall in the  home  Outcome: Progressing  Goal: Use assistive devices by end of the shift  Outcome: Progressing  Goal: Pace activities to prevent fatigue by end of the shift  Outcome: Progressing   The patient's goals for the shift include rest    The clinical goals for the shift include Pain control and control HR

## 2024-06-16 VITALS
HEART RATE: 101 BPM | RESPIRATION RATE: 20 BRPM | DIASTOLIC BLOOD PRESSURE: 71 MMHG | BODY MASS INDEX: 39.4 KG/M2 | WEIGHT: 260 LBS | OXYGEN SATURATION: 95 % | SYSTOLIC BLOOD PRESSURE: 129 MMHG | TEMPERATURE: 98.1 F | HEIGHT: 68 IN

## 2024-06-16 LAB
ANION GAP SERPL CALC-SCNC: 13 MMOL/L (ref 10–20)
BACTERIA BLD CULT: NORMAL
BACTERIA BLD CULT: NORMAL
BASOPHILS # BLD AUTO: 0 X10*3/UL (ref 0–0.1)
BASOPHILS NFR BLD AUTO: 0 %
BUN SERPL-MCNC: 46 MG/DL (ref 6–23)
CALCIUM SERPL-MCNC: 7.8 MG/DL (ref 8.6–10.3)
CHLORIDE SERPL-SCNC: 109 MMOL/L (ref 98–107)
CO2 SERPL-SCNC: 23 MMOL/L (ref 21–32)
CREAT SERPL-MCNC: 3.22 MG/DL (ref 0.5–1.3)
EGFRCR SERPLBLD CKD-EPI 2021: 18 ML/MIN/1.73M*2
EOSINOPHIL # BLD AUTO: 0.21 X10*3/UL (ref 0–0.4)
EOSINOPHIL NFR BLD AUTO: 4.4 %
ERYTHROCYTE [DISTWIDTH] IN BLOOD BY AUTOMATED COUNT: 16 % (ref 11.5–14.5)
GLUCOSE SERPL-MCNC: 104 MG/DL (ref 74–99)
HCT VFR BLD AUTO: 24.3 % (ref 41–52)
HCT VFR BLD AUTO: 24.9 % (ref 41–52)
HGB BLD-MCNC: 7.5 G/DL (ref 13.5–17.5)
HGB BLD-MCNC: 7.7 G/DL (ref 13.5–17.5)
HOLD SPECIMEN: NORMAL
IMM GRANULOCYTES # BLD AUTO: 0.09 X10*3/UL (ref 0–0.5)
IMM GRANULOCYTES NFR BLD AUTO: 1.9 % (ref 0–0.9)
LYMPHOCYTES # BLD AUTO: 0.37 X10*3/UL (ref 0.8–3)
LYMPHOCYTES NFR BLD AUTO: 7.8 %
MAGNESIUM SERPL-MCNC: 1.56 MG/DL (ref 1.6–2.4)
MCH RBC QN AUTO: 29.2 PG (ref 26–34)
MCHC RBC AUTO-ENTMCNC: 30.9 G/DL (ref 32–36)
MCV RBC AUTO: 95 FL (ref 80–100)
MONOCYTES # BLD AUTO: 0.64 X10*3/UL (ref 0.05–0.8)
MONOCYTES NFR BLD AUTO: 13.5 %
NEUTROPHILS # BLD AUTO: 3.44 X10*3/UL (ref 1.6–5.5)
NEUTROPHILS NFR BLD AUTO: 72.4 %
NRBC BLD-RTO: 0 /100 WBCS (ref 0–0)
PLATELET # BLD AUTO: 143 X10*3/UL (ref 150–450)
POTASSIUM SERPL-SCNC: 4.6 MMOL/L (ref 3.5–5.3)
RBC # BLD AUTO: 2.57 X10*6/UL (ref 4.5–5.9)
SODIUM SERPL-SCNC: 140 MMOL/L (ref 136–145)
WBC # BLD AUTO: 4.8 X10*3/UL (ref 4.4–11.3)

## 2024-06-16 PROCEDURE — 85014 HEMATOCRIT: CPT | Performed by: INTERNAL MEDICINE

## 2024-06-16 PROCEDURE — 2500000002 HC RX 250 W HCPCS SELF ADMINISTERED DRUGS (ALT 637 FOR MEDICARE OP, ALT 636 FOR OP/ED): Mod: MUE | Performed by: INTERNAL MEDICINE

## 2024-06-16 PROCEDURE — 2500000001 HC RX 250 WO HCPCS SELF ADMINISTERED DRUGS (ALT 637 FOR MEDICARE OP): Performed by: INTERNAL MEDICINE

## 2024-06-16 PROCEDURE — 2500000004 HC RX 250 GENERAL PHARMACY W/ HCPCS (ALT 636 FOR OP/ED): Performed by: INTERNAL MEDICINE

## 2024-06-16 PROCEDURE — 99233 SBSQ HOSP IP/OBS HIGH 50: CPT | Performed by: INTERNAL MEDICINE

## 2024-06-16 PROCEDURE — 83735 ASSAY OF MAGNESIUM: CPT | Performed by: INTERNAL MEDICINE

## 2024-06-16 PROCEDURE — 80048 BASIC METABOLIC PNL TOTAL CA: CPT | Performed by: INTERNAL MEDICINE

## 2024-06-16 PROCEDURE — 36415 COLL VENOUS BLD VENIPUNCTURE: CPT | Performed by: INTERNAL MEDICINE

## 2024-06-16 PROCEDURE — C9113 INJ PANTOPRAZOLE SODIUM, VIA: HCPCS | Performed by: INTERNAL MEDICINE

## 2024-06-16 PROCEDURE — 2500000004 HC RX 250 GENERAL PHARMACY W/ HCPCS (ALT 636 FOR OP/ED)

## 2024-06-16 PROCEDURE — 1200000002 HC GENERAL ROOM WITH TELEMETRY DAILY

## 2024-06-16 PROCEDURE — 85025 COMPLETE CBC W/AUTO DIFF WBC: CPT | Performed by: INTERNAL MEDICINE

## 2024-06-16 RX ORDER — PANTOPRAZOLE SODIUM 40 MG/10ML
40 INJECTION, POWDER, LYOPHILIZED, FOR SOLUTION INTRAVENOUS 2 TIMES DAILY
Status: DISCONTINUED | OUTPATIENT
Start: 2024-06-16 | End: 2024-06-19 | Stop reason: HOSPADM

## 2024-06-16 RX ORDER — TRAMADOL HYDROCHLORIDE 50 MG/1
50 TABLET ORAL EVERY 8 HOURS PRN
Status: DISCONTINUED | OUTPATIENT
Start: 2024-06-16 | End: 2024-06-19 | Stop reason: HOSPADM

## 2024-06-16 RX ORDER — DEXTROMETHORPHAN POLISTIREX 30 MG/5ML
30 SUSPENSION ORAL NIGHTLY PRN
Status: DISCONTINUED | OUTPATIENT
Start: 2024-06-16 | End: 2024-06-17

## 2024-06-16 RX ORDER — FUROSEMIDE 10 MG/ML
40 INJECTION INTRAMUSCULAR; INTRAVENOUS ONCE
Status: COMPLETED | OUTPATIENT
Start: 2024-06-16 | End: 2024-06-16

## 2024-06-16 RX ORDER — METOPROLOL TARTRATE 25 MG/1
25 TABLET, FILM COATED ORAL 2 TIMES DAILY
Status: DISCONTINUED | OUTPATIENT
Start: 2024-06-16 | End: 2024-06-19 | Stop reason: HOSPADM

## 2024-06-16 ASSESSMENT — COGNITIVE AND FUNCTIONAL STATUS - GENERAL
MOVING TO AND FROM BED TO CHAIR: TOTAL
PERSONAL GROOMING: A LITTLE
HELP NEEDED FOR BATHING: TOTAL
WALKING IN HOSPITAL ROOM: TOTAL
HELP NEEDED FOR BATHING: TOTAL
STANDING UP FROM CHAIR USING ARMS: TOTAL
CLIMB 3 TO 5 STEPS WITH RAILING: TOTAL
WALKING IN HOSPITAL ROOM: TOTAL
TURNING FROM BACK TO SIDE WHILE IN FLAT BAD: TOTAL
TOILETING: TOTAL
DRESSING REGULAR UPPER BODY CLOTHING: TOTAL
PERSONAL GROOMING: A LITTLE
DRESSING REGULAR UPPER BODY CLOTHING: TOTAL
STANDING UP FROM CHAIR USING ARMS: TOTAL
TOILETING: TOTAL
DRESSING REGULAR LOWER BODY CLOTHING: TOTAL
DRESSING REGULAR UPPER BODY CLOTHING: TOTAL
MOVING TO AND FROM BED TO CHAIR: TOTAL
DAILY ACTIVITIY SCORE: 10
TURNING FROM BACK TO SIDE WHILE IN FLAT BAD: TOTAL
EATING MEALS: A LITTLE
CLIMB 3 TO 5 STEPS WITH RAILING: TOTAL
WALKING IN HOSPITAL ROOM: TOTAL
TOILETING: TOTAL
MOVING FROM LYING ON BACK TO SITTING ON SIDE OF FLAT BED WITH BEDRAILS: A LOT
MOBILITY SCORE: 7
EATING MEALS: A LITTLE
MOVING FROM LYING ON BACK TO SITTING ON SIDE OF FLAT BED WITH BEDRAILS: A LOT
DAILY ACTIVITIY SCORE: 10
STANDING UP FROM CHAIR USING ARMS: TOTAL
DAILY ACTIVITIY SCORE: 10
MOBILITY SCORE: 7
MOVING FROM LYING ON BACK TO SITTING ON SIDE OF FLAT BED WITH BEDRAILS: A LOT
EATING MEALS: A LITTLE
MOVING TO AND FROM BED TO CHAIR: TOTAL
HELP NEEDED FOR BATHING: TOTAL
MOBILITY SCORE: 7
DRESSING REGULAR LOWER BODY CLOTHING: TOTAL
PERSONAL GROOMING: A LITTLE
DRESSING REGULAR LOWER BODY CLOTHING: TOTAL
CLIMB 3 TO 5 STEPS WITH RAILING: TOTAL
TURNING FROM BACK TO SIDE WHILE IN FLAT BAD: TOTAL

## 2024-06-16 ASSESSMENT — PAIN SCALES - GENERAL
PAINLEVEL_OUTOF10: 5 - MODERATE PAIN
PAINLEVEL_OUTOF10: 8

## 2024-06-16 ASSESSMENT — PAIN DESCRIPTION - LOCATION: LOCATION: FOOT

## 2024-06-16 ASSESSMENT — PAIN DESCRIPTION - ORIENTATION: ORIENTATION: LEFT

## 2024-06-16 NOTE — PROGRESS NOTES
"Vinicius Arriola is a 89 y.o. male on day 2 of admission presenting with Cellulitis of scrotum.  Possible scrotal abscess/hydrocele.  Patient seen by urology plan continue dual broad-spectrum antibiotic.  Patient clinically improving with that.    Subjective   Patient states he feels weak and short of breath.     Objective   Patient A-fib intermittent RVR.  Patient oxygenation okay   ROS.    Review of Systems   Constitutional:  Positive for appetite change, fatigue and unexpected weight change.   HENT:  Positive for congestion.    Eyes: Negative.    Respiratory:  Positive for cough, shortness of breath and wheezing.    Cardiovascular:  Positive for palpitations and leg swelling.   Gastrointestinal:  Positive for abdominal distention and abdominal pain.   Endocrine: Negative.    Genitourinary: Negative.    Musculoskeletal:  Positive for arthralgias and myalgias.   Skin:  Positive for rash.   Allergic/Immunologic: Negative.    Neurological:  Positive for weakness.   Hematological: Negative.    Psychiatric/Behavioral:  Positive for sleep disturbance. The patient is nervous/anxious.    All other systems reviewed and are negative.     Vital Signs  Blood pressure 120/61, pulse 68, temperature 36.8 °C (98.2 °F), resp. rate (!) 2, height 1.727 m (5' 8\"), weight 118 kg (260 lb), SpO2 97%.    Physical Exam  Physical Exam   Vitals reviewed.   Constitutional:       Appearance: He is obese. He is ill-appearing.   HENT:      Head: Normocephalic.      Nose: Congestion present.      Mouth/Throat:      Mouth: Mucous membranes are dry.   Eyes:      Conjunctiva/sclera: Conjunctivae normal.   Cardiovascular:      Rate and Rhythm: Tachycardia present.  A-fib RVR     Pulses: Normal pulses.      Heart sounds: Normal heart sounds.   Pulmonary:      Breath sounds: Wheezing and rales present.   Abdominal:      General: Bowel sounds are normal. There is distension.      Tenderness: There is ab A-fib RVR dominal tenderness. There is rebound. "   Musculoskeletal:         General: Swelling and tenderness present. Normal range of motion.      Cervical back: Normal range of motion.      Right lower leg: Edema present.      Left lower leg: Edema present.   Skin:     Capillary Refill: Capillary refill takes 2 to 3 seconds.      Findings: Erythema and rash present.   Neurological:      General: No focal deficit present.      Mental Status: He is alert and oriented to person, place, and time.   Psychiatric:         Mood and Affect: Mood normal.         Behavior: Behavior normal.         Thought Content: Thought content normal.         Judgment: Judgment normal.      Oxygen Therapy  SpO2: 97 %  Medical Gas Therapy: Supplemental oxygen  O2 Delivery Method: Nasal cannula       Intake/Output  I/O last 3 completed shifts:  In: 2050.4 (17.4 mL/kg) [P.O.:600; I.V.:310.4 (2.6 mL/kg); IV Piggyback:1140]  Out: 1700 (14.4 mL/kg) [Urine:1700 (0.4 mL/kg/hr)]  Weight: 117.9 kg     Lines and Tubes:  Peripheral IV 06/14/24 20 G Left Antecubital (Active)   Placement Date/Time: 06/14/24 1522   Size (Gauge): 20 G  Orientation: Left  Location: Antecubital  Technique: Ultrasound guidance  Placed by: Wesley JANSEN  Insertion attempts: 1   Number of days: 2       Peripheral IV 06/14/24 20 G Right Antecubital (Active)   Placement Date/Time: 06/14/24 2148   Hand Hygiene Completed: Yes  Size (Gauge): 20 G  Orientation: Right  Location: Antecubital  Site Prep: Alcohol;Chlorhexidine    Number of days: 1       External Urinary Catheter Male (Active)   Placement Date/Time: 06/14/24 2200   Hand Hygiene Completed: Yes  External Catheter Type: Male   Number of days: 1       Results for orders placed or performed during the hospital encounter of 06/14/24 (from the past 96 hour(s))   ECG 12 lead   Result Value Ref Range    Ventricular Rate 126 BPM    Atrial Rate 129 BPM    QRS Duration 78 ms    QT Interval 324 ms    QTC Calculation(Bazett) 469 ms    R Axis 81 degrees    T Axis 42 degrees    QRS Count  20 beats    Q Onset 229 ms    T Offset 391 ms    QTC Fredericia 415 ms   CBC and Auto Differential   Result Value Ref Range    WBC 4.3 (L) 4.4 - 11.3 x10*3/uL    nRBC 0.0 0.0 - 0.0 /100 WBCs    RBC 2.93 (L) 4.50 - 5.90 x10*6/uL    Hemoglobin 8.6 (L) 13.5 - 17.5 g/dL    Hematocrit 28.0 (L) 41.0 - 52.0 %    MCV 96 80 - 100 fL    MCH 29.4 26.0 - 34.0 pg    MCHC 30.7 (L) 32.0 - 36.0 g/dL    RDW 16.2 (H) 11.5 - 14.5 %    Platelets 159 150 - 450 x10*3/uL    Neutrophils % 73.8 40.0 - 80.0 %    Immature Granulocytes %, Automated 1.9 (H) 0.0 - 0.9 %    Lymphocytes % 7.0 13.0 - 44.0 %    Monocytes % 11.5 2.0 - 10.0 %    Eosinophils % 5.6 0.0 - 6.0 %    Basophils % 0.2 0.0 - 2.0 %    Neutrophils Absolute 3.14 1.60 - 5.50 x10*3/uL    Immature Granulocytes Absolute, Automated 0.08 0.00 - 0.50 x10*3/uL    Lymphocytes Absolute 0.30 (L) 0.80 - 3.00 x10*3/uL    Monocytes Absolute 0.49 0.05 - 0.80 x10*3/uL    Eosinophils Absolute 0.24 0.00 - 0.40 x10*3/uL    Basophils Absolute 0.01 0.00 - 0.10 x10*3/uL   Comprehensive metabolic panel   Result Value Ref Range    Glucose 104 (H) 74 - 99 mg/dL    Sodium 140 136 - 145 mmol/L    Potassium 5.1 3.5 - 5.3 mmol/L    Chloride 107 98 - 107 mmol/L    Bicarbonate 26 21 - 32 mmol/L    Anion Gap 12 10 - 20 mmol/L    Urea Nitrogen 51 (H) 6 - 23 mg/dL    Creatinine 3.50 (H) 0.50 - 1.30 mg/dL    eGFR 16 (L) >60 mL/min/1.73m*2    Calcium 8.5 (L) 8.6 - 10.3 mg/dL    Albumin 3.0 (L) 3.4 - 5.0 g/dL    Alkaline Phosphatase 60 33 - 136 U/L    Total Protein 6.2 (L) 6.4 - 8.2 g/dL    AST 31 9 - 39 U/L    Bilirubin, Total 0.3 0.0 - 1.2 mg/dL    ALT 14 10 - 52 U/L   Lipase   Result Value Ref Range    Lipase 26 9 - 82 U/L   Lactate   Result Value Ref Range    Lactate 1.3 0.4 - 2.0 mmol/L   C-Reactive Protein   Result Value Ref Range    C-Reactive Protein 10.32 (H) <1.00 mg/dL   Sedimentation Rate   Result Value Ref Range    Sedimentation Rate 38 (H) 0 - 20 mm/h   Blood Culture    Specimen: Peripheral  Venipuncture; Blood culture   Result Value Ref Range    Blood Culture No growth at 1 day    Light Blue Top   Result Value Ref Range    Extra Tube Hold for add-ons.    Lavender Top   Result Value Ref Range    Extra Tube Hold for add-ons.    Blood Culture    Specimen: Peripheral Venipuncture; Blood culture   Result Value Ref Range    Blood Culture No growth at 1 day    SST TOP   Result Value Ref Range    Extra Tube Hold for add-ons.    B-Type Natriuretic Peptide   Result Value Ref Range     (H) 0 - 99 pg/mL   Vancomycin   Result Value Ref Range    Vancomycin 17.9 5.0 - 20.0 ug/mL   CBC   Result Value Ref Range    WBC 4.8 4.4 - 11.3 x10*3/uL    nRBC 0.0 0.0 - 0.0 /100 WBCs    RBC 2.79 (L) 4.50 - 5.90 x10*6/uL    Hemoglobin 8.2 (L) 13.5 - 17.5 g/dL    Hematocrit 27.2 (L) 41.0 - 52.0 %    MCV 98 80 - 100 fL    MCH 29.4 26.0 - 34.0 pg    MCHC 30.1 (L) 32.0 - 36.0 g/dL    RDW 16.1 (H) 11.5 - 14.5 %    Platelets 142 (L) 150 - 450 x10*3/uL   Comprehensive metabolic panel   Result Value Ref Range    Glucose 86 74 - 99 mg/dL    Sodium 139 136 - 145 mmol/L    Potassium 5.0 3.5 - 5.3 mmol/L    Chloride 107 98 - 107 mmol/L    Bicarbonate 23 21 - 32 mmol/L    Anion Gap 14 10 - 20 mmol/L    Urea Nitrogen 48 (H) 6 - 23 mg/dL    Creatinine 3.35 (H) 0.50 - 1.30 mg/dL    eGFR 17 (L) >60 mL/min/1.73m*2    Calcium 8.3 (L) 8.6 - 10.3 mg/dL    Albumin 2.9 (L) 3.4 - 5.0 g/dL    Alkaline Phosphatase 59 33 - 136 U/L    Total Protein 5.8 (L) 6.4 - 8.2 g/dL    AST 28 9 - 39 U/L    Bilirubin, Total 0.4 0.0 - 1.2 mg/dL    ALT 13 10 - 52 U/L   Vancomycin   Result Value Ref Range    Vancomycin 15.3 5.0 - 20.0 ug/mL   Basic Metabolic Panel   Result Value Ref Range    Glucose 104 (H) 74 - 99 mg/dL    Sodium 140 136 - 145 mmol/L    Potassium 4.6 3.5 - 5.3 mmol/L    Chloride 109 (H) 98 - 107 mmol/L    Bicarbonate 23 21 - 32 mmol/L    Anion Gap 13 10 - 20 mmol/L    Urea Nitrogen 46 (H) 6 - 23 mg/dL    Creatinine 3.22 (H) 0.50 - 1.30 mg/dL    eGFR  18 (L) >60 mL/min/1.73m*2    Calcium 7.8 (L) 8.6 - 10.3 mg/dL   CBC and Auto Differential   Result Value Ref Range    WBC 4.8 4.4 - 11.3 x10*3/uL    nRBC 0.0 0.0 - 0.0 /100 WBCs    RBC 2.57 (L) 4.50 - 5.90 x10*6/uL    Hemoglobin 7.5 (L) 13.5 - 17.5 g/dL    Hematocrit 24.3 (L) 41.0 - 52.0 %    MCV 95 80 - 100 fL    MCH 29.2 26.0 - 34.0 pg    MCHC 30.9 (L) 32.0 - 36.0 g/dL    RDW 16.0 (H) 11.5 - 14.5 %    Platelets 143 (L) 150 - 450 x10*3/uL    Neutrophils % 72.4 40.0 - 80.0 %    Immature Granulocytes %, Automated 1.9 (H) 0.0 - 0.9 %    Lymphocytes % 7.8 13.0 - 44.0 %    Monocytes % 13.5 2.0 - 10.0 %    Eosinophils % 4.4 0.0 - 6.0 %    Basophils % 0.0 0.0 - 2.0 %    Neutrophils Absolute 3.44 1.60 - 5.50 x10*3/uL    Immature Granulocytes Absolute, Automated 0.09 0.00 - 0.50 x10*3/uL    Lymphocytes Absolute 0.37 (L) 0.80 - 3.00 x10*3/uL    Monocytes Absolute 0.64 0.05 - 0.80 x10*3/uL    Eosinophils Absolute 0.21 0.00 - 0.40 x10*3/uL    Basophils Absolute 0.00 0.00 - 0.10 x10*3/uL   Magnesium   Result Value Ref Range    Magnesium 1.56 (L) 1.60 - 2.40 mg/dL   SST TOP   Result Value Ref Range    Extra Tube Hold for add-ons.    Hemoglobin and hematocrit, blood   Result Value Ref Range    Hemoglobin 7.7 (L) 13.5 - 17.5 g/dL    Hematocrit 24.9 (L) 41.0 - 52.0 %      Relevant Results  Recent Results (from the past 24 hour(s))   Basic Metabolic Panel    Collection Time: 06/16/24  6:46 AM   Result Value Ref Range    Glucose 104 (H) 74 - 99 mg/dL    Sodium 140 136 - 145 mmol/L    Potassium 4.6 3.5 - 5.3 mmol/L    Chloride 109 (H) 98 - 107 mmol/L    Bicarbonate 23 21 - 32 mmol/L    Anion Gap 13 10 - 20 mmol/L    Urea Nitrogen 46 (H) 6 - 23 mg/dL    Creatinine 3.22 (H) 0.50 - 1.30 mg/dL    eGFR 18 (L) >60 mL/min/1.73m*2    Calcium 7.8 (L) 8.6 - 10.3 mg/dL   CBC and Auto Differential    Collection Time: 06/16/24  6:46 AM   Result Value Ref Range    WBC 4.8 4.4 - 11.3 x10*3/uL    nRBC 0.0 0.0 - 0.0 /100 WBCs    RBC 2.57 (L) 4.50 -  5.90 x10*6/uL    Hemoglobin 7.5 (L) 13.5 - 17.5 g/dL    Hematocrit 24.3 (L) 41.0 - 52.0 %    MCV 95 80 - 100 fL    MCH 29.2 26.0 - 34.0 pg    MCHC 30.9 (L) 32.0 - 36.0 g/dL    RDW 16.0 (H) 11.5 - 14.5 %    Platelets 143 (L) 150 - 450 x10*3/uL    Neutrophils % 72.4 40.0 - 80.0 %    Immature Granulocytes %, Automated 1.9 (H) 0.0 - 0.9 %    Lymphocytes % 7.8 13.0 - 44.0 %    Monocytes % 13.5 2.0 - 10.0 %    Eosinophils % 4.4 0.0 - 6.0 %    Basophils % 0.0 0.0 - 2.0 %    Neutrophils Absolute 3.44 1.60 - 5.50 x10*3/uL    Immature Granulocytes Absolute, Automated 0.09 0.00 - 0.50 x10*3/uL    Lymphocytes Absolute 0.37 (L) 0.80 - 3.00 x10*3/uL    Monocytes Absolute 0.64 0.05 - 0.80 x10*3/uL    Eosinophils Absolute 0.21 0.00 - 0.40 x10*3/uL    Basophils Absolute 0.00 0.00 - 0.10 x10*3/uL   Magnesium    Collection Time: 06/16/24  6:46 AM   Result Value Ref Range    Magnesium 1.56 (L) 1.60 - 2.40 mg/dL   SST TOP    Collection Time: 06/16/24  9:01 AM   Result Value Ref Range    Extra Tube Hold for add-ons.    Hemoglobin and hematocrit, blood    Collection Time: 06/16/24  1:25 PM   Result Value Ref Range    Hemoglobin 7.7 (L) 13.5 - 17.5 g/dL    Hematocrit 24.9 (L) 41.0 - 52.0 %      ECG 12 lead    Result Date: 6/15/2024  Accelerated Junctional rhythm Low voltage QRS Nonspecific ST abnormality Abnormal ECG When compared with ECG of 30-MAY-2024 08:33, Junctional rhythm has replaced Atrial flutter See ED provider note for full interpretation and clinical correlation Confirmed by Chavo Cid ([id number]) on 6/15/2024 11:11:58 AM    US scrotum w doppler    Result Date: 6/14/2024  Interpreted By:  Julieta Callahan, STUDY: US SCROTUM WITH DOPPLER; 6/14/2024 4:37 pm   INDICATION: Swelling, pain, and erythema.   COMPARISON: None.   ACCESSION NUMBER(S): [id number]   ORDERING CLINICIAN: QUE GUZMAN   TECHNIQUE: Gray scale and color doppler imaging of the scrotum was performed with spectral waveform analysis. Arterial inflow and venous outflow  documented. Duplex Doppler interrogation including color flow and spectral waveform analysis is performed.   FINDINGS: Right testis: 3.4 x 2.0 x 3.2 cm.   Left testis: 4.9 x 2.9 x 3.0 cm.   Epididymides: The epididymides are normal in size and echogenicity. There is a 2 x 3 x 3 mm cyst within right epididymal head.   The testes appear homogeneous with no intratesticular lesions. Ectasia of the rete testes on the right is present. Duplex Doppler interrogation of each testicle including color flow and spectral waveform analysis shows normal arterial and venous flow without torsion or orchitis.   There is a 3.6 x 6.0 x 3.0 encapsulated fluid collection anterior and superior to the right testicle containing low-level internal echoes and debris. This does not arise from the right epididymis. This may represent a scrotal wall abscess. There appears to be diffuse thickening of the scrotal wall.       3.0 x 3.6 x 6.0 cm encapsulated fluid collection noted which is extratesticular in location and is seen anterior and superior to the right testicle. This contains low-level internal echoes and some thickening of its wall with internal debris. This may represent a scrotal abscess. Clinical correlation is necessary.   Ectasia of the rete testes on the right.   3 mm cyst right epididymal head.   No epididymal orchitis.   MACRO: None.   Signed by: Julieta Callahan 6/14/2024 4:59 PM Dictation workstation:   [id number]    XR chest 2 views    Result Date: 6/14/2024  Interpreted By:  Josh Soto, STUDY: XR CHEST 2 VIEWS;  6/14/2024 3:10 pm   INDICATION: Signs/Symptoms:Cough.   COMPARISON: 05/28/2024   ACCESSION NUMBER(S): [id number]   ORDERING CLINICIAN: WES ORTIZ   FINDINGS: Pacemaker leads are intact and properly positioned.       CARDIOMEDIASTINAL SILHOUETTE: Cardiomediastinal silhouette is normal in size and configuration.   LUNGS: There is complete opacification left hemithorax with volume loss and shift of the mediastinum  to the left.   Since the prior examination further increase in density of the right lung base which may suggest atelectasis or infiltrate. Correlation with auscultation and inflammatory markers recommended. A small to moderate on right pleural effusion persists.   ABDOMEN: No remarkable upper abdominal findings.   BONES: No acute osseous changes.       1.  Complete opacification with volume loss left hemithorax. Increased density right lung base reflecting small to moderate right pleural effusion. Superimposed pneumonia can not be excluded.       MACRO: None   Signed by: Josh Soto 6/14/2024 3:36 PM Dictation workstation:   [id number]    CT abdomen pelvis wo IV contrast    Result Date: 6/14/2024  Interpreted By:  Schoenberger, Joseph, STUDY: CT ABDOMEN PELVIS WO IV CONTRAST;  6/14/2024 2:55 pm   INDICATION: Signs/Symptoms:Scrotal swelling, erythema, erythema extending up into the abdomen.   COMPARISON: 05/22/2024   ACCESSION NUMBER(S): [id number]   ORDERING CLINICIAN: WES ORTIZ   TECHNIQUE: CT of the abdomen and pelvis was performed. Contiguous axial images were obtained at 3 mm slice thickness through the abdomen and pelvis. Coronal and sagittal reconstructions at 3 mm slice thickness were performed.  No intravenous or oral contrast agents were administered.   FINDINGS: Please note that the evaluation of vessels, lymph nodes and organs is limited without intravenous contrast.   LOWER CHEST: There is some persistent volume loss in the left lower lobe with persistent consolidation. There are few air bronchograms. There is rounded collection of fluid at the anterior aspect of this process which may relate to either necrotizing pneumonia or loculated pleural fluid or conceivably pulmonary abscess. However this is also unchanged from the prior. There is a however, a new moderate dependent layering right pleural effusion.   ABDOMEN:   LIVER: 2 cysts located in the superior aspect the left liver lobe are  stable from the prior. No other focal abnormality.   BILE DUCTS: No dilation   GALLBLADDER: Dependent layering calcified gallstones. No wall thickening or pericholecystic fluid.   PANCREAS: Unremarkable   SPLEEN: Unremarkable   ADRENAL GLANDS: Bilateral adrenal glands appear normal.   KIDNEYS AND URETERS: The kidneys are normal in size and unremarkable in appearance.  No hydroureteronephrosis or nephroureterolithiasis is identified. 5 mm nonobstructing lower pole right renal stone unchanged.   PELVIS:   BLADDER: High choose 1   REPRODUCTIVE ORGANS: Unremarkable   BOWEL: The stomach is unremarkable.  Small bowel loops are normal in caliber without mural thickening. Colon is normal in caliber. There is a mobile cecum. There are multiple colonic diverticula. There is no convincing evidence for acute diverticulitis. Normal appendix.   VESSELS: There is no aneurysmal dilatation of the abdominal aorta. The IVC appears normal.   PERITONEUM/RETROPERITONEUM/LYMPH NODES: There is no free or loculated fluid collection, no free intraperitoneal air. The retroperitoneum appears normal.  No abdominopelvic lymphadenopathy is present. The   ABDOMINAL WALL: There is soft tissues Amanda in the abdominal wall which is new from the prior. This is a set metric Roseann more pronounced on the left than on the right. This extends into the lower abdominal left-sided pannus. It does not appear to overtly involve the scrotum. There may be loculated right-sided hydrocele.   BONES: No suspicious osseous lesions are identified. 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       1.  The high significant development of abdominal wall edema in the subcutaneous tissues asymmetrically worse on the left. This does not appear to especially extend into the scrotum. 2. There is a new moderate right pleural effusion. 3. Persistent left basal consolidation with anterior oval shaped collection of fluid with similar appearance to  prior other than lack of air bronchograms on this exam compared to the prior. Associated volume loss.     MACRO: None   Signed by: Joseph Schoenberger 6/14/2024 3:30 PM Dictation workstation:   [id number]    US renal complete    Result Date: 6/8/2024  Interpreted By:  Ryan Best and Weaver Jakob STUDY: US RENAL COMPLETE;  6/6/2024 5:51 pm   INDICATION: Signs/Symptoms:VIK.   COMPARISON: None.   ACCESSION NUMBER(S): [id number]   ORDERING CLINICIAN: ADRYAN SIMON   TECHNIQUE: Multiple images of the kidneys were obtained.   FINDINGS: RIGHT KIDNEY: The right kidney measures 11.8 cm in length. The renal cortical echogenicity is increased. Renal cortical thickness is mildly decreased. No hydronephrosis is present; no evidence of nephrolithiasis.   LEFT KIDNEY: The left kidney is poorly visualized, measuring approximately 11.8 cm. Renal cortical echogenicity is increased. No apparent hydronephrosis or nephrolithiasis.   BLADDER: The urinary bladder is unremarkable in appearance. Poorly visualized small volume free fluid in the left upper quadrant.       Examination is somewhat limited due to patient body habitus, positioning. Within this limitation: Increased renal cortical echogenicity bilaterally which may relate to medical renal disease. No hydronephrosis or nephrolithiasis.   I personally reviewed the images/study and I agree with the findings as stated. This study was interpreted at [place], [place].   MACRO: None   Signed by: Ryan Urena 6/8/2024 3:02 AM Dictation workstation:   [id number]    ECG 12 Lead    Result Date: 5/31/2024  Atrial flutter with variable AV block with premature ventricular or aberrantly conducted complexes Low voltage QRS Abnormal ECG When compared with ECG of 28-MAY-2024 01:40, Atrial flutter has replaced Atrial fibrillation Nonspecific T wave abnormality, improved in Inferior leads Confirmed by Jovanny Loera  ([id number]) on 5/31/2024 1:51:33 PM    Flexible Sigmoidoscopy    Result Date: 5/31/2024  Table formatting from the original result was not included. Impression Dark red blood visualized in the rectum. Blood was cleared without any active bleeding appreciated nor underlying mucosal changes. Few small and large, scattered diverticula with no inflammation in the sigmoid colon; no bleeding was identified. Diverticula were irrigated without any bleeding or stigmata of recent bleed appreciated. All observed locations appeared normal, including the sigmoid colon, rectosigmoid and rectum. No mucosal abnormalities. Normal on retroflexion. Green bilious stool proximal to the rectum, most notably in the proximal descending colon and transverse colon, without any blood, blood clots or hematin. Findings Dark red blood visualized in the rectum. Blood was cleared without any active bleeding appreciated nor underlying mucosal changes. Few small and large, scattered diverticula with no inflammation in the sigmoid colon; no bleeding was identified. Diverticula were irrigated without any bleeding or stigmata of recent bleed appreciated. All observed locations appeared normal, including the sigmoid colon, rectosigmoid and rectum. No mucosal abnormalities. Normal on retroflexion. Green bilious stool proximal to the rectum, most notably in the proximal descending colon and transverse colon, without any blood, blood clots or hematin. Recommendation  Follow up with primary gastroenterologist  Follow up with inpatient GI consult service, Dr. Valenzuela and Dr. 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 The patient's tolerance of previous anesthesia has been reviewed. The risks and benefits of the procedure and the sedation options and risks were discussed with the patient. All questions were answered and informed consent obtained. Details of the Procedure The patient underwent no sedation. The patient's blood pressure, ECG, heart rate, level of consciousness, oxygen and respirations were monitored throughout the procedure. A digital rectal exam was performed. A perianal exam was performed. The scope was introduced through the anus and advanced to the transverse colon. Retroflexion was performed in the rectum. The quality of bowel preparation was evaluated using the [place] Bowel Preparation Scale with scores of: left colon = 3. Bowel prep was not adequate. The patient experienced no blood loss. The procedure was not difficult. The patient tolerated the procedure well. There were no apparent adverse events. Events Procedure Events Event Event Time ENDO SCOPE IN TIME 5/24/2024  1:08 PM ENDO SCOPE OUT TIME 5/24/2024  1:26 PM Specimens No specimens collected Procedure Location [facility] Int[facility] [address] [facility] [id number] [phone] Referring Provider Grayson Nichols, APRN- [address] [address] Procedure Provider Ale Archibald MD     Lower extremity venous duplex left    Result Date: 5/31/2024  Interpreted By:  Nathan Argueta and Weaver Jakob STUDY: [facility] US LOWER EXTREMITY VENOUS DUPLEX LEFT;  5/30/2024 3:58 pm   INDICATION: Signs/Symptoms:LLE swelling, eval for DVT.   COMPARISON: None.   ACCESSION NUMBER(S): [id number]   ORDERING  CLINICIAN: ALEXANDRA AVILA   TECHNIQUE: Vascular ultrasound of the left lower extremity was performed. Real-time compression views as well as Gray scale, color Doppler and spectral Doppler waveform analysis was performed.   FINDINGS: Evaluation of the visualized portions of the left common femoral vein, proximal, mid, and distal femoral vein, and popliteal vein were performed.  Evaluation of the visualized portions of the  posterior tibial and peroneal veins were also performed. Evaluation of the calf veins limited due to edema.   Echogenic intraluminal material in the proximal greater saphenous vein with partial compressibility and color Doppler signal The evaluated veins demonstrate normal compressibility. There is intact venous flow demonstrating normal respiratory variability and normal augmentation of flow with calf compression. Therefore, there is no ultrasonographic evidence for deep vein thrombosis within the evaluated veins.       1.  No sonographic evidence for deep vein thrombosis within the evaluated veins of the left lower extremity. 2. Nonocclusive thrombus of the proximal greater saphenous vein, a superficial vein. Recommend correlation with concern for superficial thrombophlebitis.   I personally reviewed the images/study and I agree with the findings as stated by Agustín Cheatham MD. This study was interpreted at [place], [place].   MACRO: None   Signed by: Nathan Argueta 5/31/2024 5:32 AM Dictation workstation:   [id number]    ECG 12 Lead    Result Date: 5/30/2024  Atrial flutter with variable AV block Rightward axis Low voltage QRS Nonspecific ST and T wave abnormality Abnormal ECG When compared with ECG of 16-MAY-2024 21:13, Significant changes have occurred Confirmed by Jovanny Loera ([id number]) on 5/30/2024 11:03:21 AM    US chest    Result Date: 5/29/2024  Interpreted By:  Dinh Plunkett, STUDY: US CHEST; 5/29/20243:53 pm   INDICATION:  Signs/Symptoms:Thoracentesis (Left) diagnostic/therapeutic.   COMPARISON: None.   ACCESSION NUMBER(S): [id number]   ORDERING CLINICIAN: ALEXANDRA AVILA   TECHNIQUE: INTERVENTIONALIST(S): Dinh Plunkett   CONSENT: The patient/patient's POA/next of kin was informed of the nature of the proposed procedure. The purposes, alternatives, risks, and benefits were explained and discussed. All questions were answered and consent was obtained.   SEDATION: None   TIME OUT: A time out was performed immediately prior to procedure start with the interventional team, correctly identifying the patient name, date of birth, MRN, procedure, anatomy (including marking of site and side), patient position, procedure consent form, relevant laboratory and imaging test results, antibiotic administration, safety precautions, and procedure-specific equipment needs.   FINDINGS: The patient was placed in the supine position.   The thoracic space was examined with grey scale ultrasound, and an absence of free fluid was demonstrated on ultrasound. Thoracic images were captured to demonstrate. A thoracentesis was not performed.       Absence of free fluid for procedure. A thoracentesis was not performed.   I personally performed and/or directly supervised this study and was present for the entire procedure.   Performed and dictated at [facility].   Signed by: Dinh Plunkett 5/29/2024 3:53 PM Dictation workstation:   [id number]    XR chest 1 view    Result Date: 5/28/2024  Interpreted By:  Jovany Nuñez, STUDY: XR CHEST 1 VIEW;  5/28/2024 8:03 am   INDICATION: Signs/Symptoms:Rule out infection.   COMPARISON: Chest radiograph dated 5/27/2024   ACCESSION NUMBER(S): [id number]   ORDERING CLINICIAN: ALEXANDRA AVILA   FINDINGS: AP radiograph of the chest   Pacemaker electrodes appear in adequate position. There is virtually complete opacification of the left thorax. The heart mediastinum are shifted to the left  indicating volume loss. Compensatory hyperaeration of the right lung and pulmonary vascular shunting to the right lung is noted increased from previous study.         1. Virtually complete opacification of the left thorax with heart and mediastinum shifted to the left indicating volume loss 2. Compensatory pulmonary vascular shunting to the right lung       Signed by: Jovany Nuñez 5/28/2024 10:38 AM Dictation workstation:   [id number]    Electrocardiogram, 12-lead PRN ACS symptoms    Result Date: 5/28/2024  Atrial fibrillation with rapid ventricular response Nonspecific ST and T wave abnormality , probably digitalis effect Abnormal ECG When compared with ECG of 28-MAY-2024 01:39, (unconfirmed) No significant change was found Confirmed by Jovanny Loera ([id number]) on 5/28/2024 10:17:48 AM    FL modified barium swallow study    Result Date: 5/26/2024  Interpreted By:  Sohail Torres,  and Xavi Jerez STUDY: FL MODIFIED BARIUM SWALLOW STUDY;; 5/25/2024 9:06 am   INDICATION: Signs/Symptoms:r\o dysphagia.   COMPARISON: None.   ACCESSION NUMBER(S): [id number]   ORDERING CLINICIAN: ALEXANDRA AVILA   TECHNIQUE: MBSS completed. Informed verbal consent obtained prior to completion of exam. Trials of thin, nectar thick,  puree, and regular solids given. Fluoroscopy time :  2 minutes.   SLP: Meri Hurley MS CCC/SLP Phone/Pager: Epic Chat   SPEECH FINDINGS: Reason for referral: Further assessment of oropharyngeal swallow Patient hx: Recent RLL PNA. S/s of aspiration w/3 oz Swallow Protocol Respiratory status: Nasal cannula Previous diet: Reg/thin   FINAL SPEECH RECOMMENDATIONS   Diet recommendations/feeding strategies: Solid Diet Recommendations : Easy to Chew Liquid Diet Recommendations: Thin Medication Administration: Single w/ sips of water, whole in puree as needed   Safe Swallow Strategies/Guidelines: Only present PO intake when awake and alert Supervision/assist w/ PO intake to assure adherence to safe swallow strategies  Upright positioning Slow rate/small boluses   Follow-up speech therapy recommended: Yes.   Short term goals: Pt will tolerate least restrictive diet with adequate oral phase and no s/s of aspiration. Date initiated: 5/25/24 Status: Goal initiated   Pt will adhere to safe swallow strategies during PO intake with > 90% accuracy independently. Date initiated: 5/25/24 Status: Goal initiated   Education provided: Yes.     Mechanics of the swallow summary: *Oral phase: Mild deficits.   Adequate bolus acceptance.  Prolonged mastication and bolus formation/transit w/ regular solids.  No oral residue.   *Pharyngeal phase: Mild deficits.   Incomplete epiglottic inversion, may be related to presence of NGT. Timely swallow initiation.  Adequate hyolaryngeal elevation/excursion.  Trace penetration x1 with consecutive boluses of thin liquids related to incomplete airway protection.  Ejected upon swallow completion.  No other penetration and no aspiration with any other consistency assessed.  Mild residue at the valleculae following the swallow.  Largely cleared with liquid wash.  Mildly reduced distention of PES.     SLP impressions with severity rating: Mild oropharyngeal dysphagia characterized by prolonged mastication/bolus formation and mild residue at the valleculae following the swallow.   Thin Liquids (MBSS) Rosenbek's Penetration Aspiration Scale, Thin Liquids (MBSS): 2. PENETRATION that CLEARS - contrast enter airway, above vocal cords, no residue     Nectar Thick Liquids (MBSS) Rosenbek's Penetration Aspiration Scale, Nectar thick liquids (MBSS): 1. NO ASPIRATION & NO PENETRATION - no aspiration, contrast does not enter airway       Purees (MBSS) Rosenbek's Penetration Aspiration Scale, Purees (MBSS): 1. NO ASPIRATION & NO PENETRATION - no aspiration, contrast does not enter airway     Solids (MBSS) Rosenbek's Penetration Aspiration Scale, Solids (MBSS): 1. NO ASPIRATION & NO PENETRATION - no aspiration, contrast does  not enter airway     Speech Therapy section of this report signed by Meri Hurley MS CCC/SLP on 5/25/2024 at 10:17 am.   RADIOLOGY FINDINGS: Nasoenteric tube is partially visualized. No evidence of acute osseous abnormality of the cervical spine. Patient is edentulous.   Radiology section of this report signed by Dr. Torres.       1. No radiographic evidence of acute process in the neck. 2. Please refer to speech pathology report for additional findings.   MACRO: None   Signed by: Sohail Torres 5/26/2024 3:51 PM Dictation workstation:   [id number]    XR abdomen 1 view    Result Date: 5/25/2024  Interpreted By:  Marcie Shah, STUDY: XR ABDOMEN 1 VIEW; 5/25/2024 5:45 pm   INDICATION: Signs/Symptoms:ngt placed.   COMPARISON: Radiograph dated 05/25/2024, 2:33 p.m.   ACCESSION NUMBER(S): [id number]   ORDERING CLINICIAN: ALEXANDRA AVILA   FINDINGS: Single-view of the abdomen. The pelvis is not included. Enteric tube is in place with the tip projecting over the stomach. Positive contrast is identified in the gastric fundus. Prominent loops of bowel throughout the visualized upper abdomen. Nonobstructive bowel gas pattern.  Limited evaluation of pneumoperitoneum on supine imaging, however no gross evidence of free air is noted.   Extensive consolidation in visualized portion of the left lung.   Osseous structures demonstrate no acute bony changes.       1.  Enteric tube projecting over the proximal stomach. 2. Again seen multiple prominent loops of bowel throughout the upper abdomen. Correlation with ileus. Recommend follow-up radiograph.   Signed by: Marcie Rodriguez 5/25/2024 5:54 PM Dictation workstation:   [id number]    XR abdomen 1 view    Result Date: 5/25/2024  Interpreted By:  Marcie Shah, STUDY: XR ABDOMEN 1 VIEW; 5/25/2024 3:06 pm   INDICATION: Signs/Symptoms:distended abdomen, assess for dilated bowel loops.   COMPARISON: Radiograph dated 05/21/2024   ACCESSION NUMBER(S):  [id number]   ORDERING CLINICIAN: ALEXANDRA AVILA   FINDINGS: Views of the abdomen and pelvis. What is presumed to be enteric tube is projecting over the lower mediastinum. Positive contrast is identified in the expected location of the stomach. There is also positive contrast throughout the loops of bowel in the lower abdomen. Prominent loops of colon. Limited evaluation of pneumoperitoneum on supine imaging, however no gross evidence of free air is noted.   Consolidative opacities in left lung.   Osseous structures demonstrate no acute bony changes.       1.  Prominent loops of colon which can be due to ileus. Recommend follow-up radiograph.   Signed by: Marice Rodriguez 5/25/2024 5:33 PM Dictation workstation:   [id number]    XR chest 1 view    Result Date: 5/23/2024  Interpreted By:  Jovany Nuñez and Baker Zachary STUDY: XR CHEST 1 VIEW; ;  5/22/2024 6:45 pm   INDICATION: Signs/Symptoms:Worsening tachypnea, confusion.   COMPARISON: Chest radiograph on 05/21/2024.   ACCESSION NUMBER(S): [id number]   ORDERING CLINICIAN: SUN CHAMPAGNE   FINDINGS: Single AP view of the chest.   Right subclavian vein approach pacemaker/AICD in place with leads projecting over the right atrium and ventricle. Enteric tube coursing below diaphragm tip overlying the expected position of the gastric body.   The cardiomediastinal silhouette is obscured, similar to prior exam.   Persistent near-complete opacification of the left hemithorax, with mild interval increase in air bronchograms within the left upper lung zone. Redemonstration of patchy right infrahilar airspace opacities and interstitial prominence on the right. No right-sided pleural effusion or pneumothorax.   No acute osseous abnormality.       1. Persistent near-complete opacification of the left hemithorax, with mild interval increase in air bronchograms within the left upper lung zone. 2. Persistent patchy right infrahilar airspace opacities and interstitial  prominence throughout the right lung, which may represent aspiration/pneumonia in the appropriate clinical setting. 3. Medical devices as above.   I personally reviewed the images/study and I agree with the findings as stated by Dr. Raymond Armstrong M.D. This study was interpreted at [facility], [place], [place].   MACRO: None   Signed by: Jovany Nuñez 5/23/2024 7:32 AM Dictation workstation:   [id number]    CT chest abdomen pelvis wo IV contrast    Result Date: 5/23/2024  Interpreted By:  Sohail Torres,  and Jeff Ibrahim STUDY: CT CHEST ABDOMEN PELVIS WO CONTRAST;  5/22/2024 12:15 pm   INDICATION: Signs/Symptoms:c/f sepsis.   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 N/V/D with [employer] concernign for UPI GIB and ongoing fevers.   COMPARISON: CT chest/abdomen/pelvis 05/18/2024   ACCESSION NUMBER(S): [id number]   ORDERING CLINICIAN: SUN CHAMPAGNE   TECHNIQUE: CT of the chest, abdomen and pelvis was performed. Contiguous axial images were obtained at 3 mm slice thickness through the chest, abdomen and pelvis. Coronal and sagittal reconstructions at 3 mm slice thickness were performed.  No intravenous contrast was administered; positive oral contrast was given.   FINDINGS: Please note that the study is limited without intravenous contrast.   Respiratory motion artifact.   CHEST:   LUNG/PLEURA/LARGE AIRWAYS: Trachea and bilateral mainstem bronchi are patent.   Again seen complete opacification of the left hemithorax with air bronchograms consistent with significant collapse of the left lung.   There is left pleural thickening (example series 2, image 38) with a focus of left posterior chest wall extension at the level of the 9th-10th (series 2, image 71) and 10th-11th (series 2, image 82) rib spaces, similar to prior, findings consistent with patient's  known mesothelioma.   Similar small loculated pleural effusion measuring 7.1 x 4.1 x 2.3 cm (series 2, image 66 and series 900, image 62).   Interval improvement of right lower lobe ill-defined patchy infiltrate (series 4, image 143). Slight interval increase in right trace pleural effusion. No new focal consolidations. No evidence of pneumothorax.   VESSELS: The previously noted ill-defined hypodensity in the left pulmonary artery is not well evaluated on this exam due to beam hardening artifact, positioning of patient's arms, and lack of venous contrast.   Dilated main pulmonary artery measuring 3.8 cm (series 2, image 40), similar to prior. Ectatic ascending aorta measuring 4.1 cm, similar to prior.  Mild to moderate coronary artery calcifications are present.   HEART: The heart is normal in size.  Trace pericardial fluid, similar to prior. Right-sided subclavian approach dual-chambercardiac ICD/pacemaker, with the leads in the right atrium and right ventricle.   MEDIASTINUM AND ANDREA: Multiple prominent mediastinal and perihilar lymph nodes similar to prior. For example:   1.3 cm subcarinal lymph node (series 2, image 48).   1 cm perihilar lymph node (series 2, image 33).   Enteric tube courses through the esophagus and terminates in the body of the stomach. Otherwise otherwise esophagus is within normal limits.   CHEST WALL AND LOWER NECK: Right chest wall cardiac pacemaker as described above. The visualized thyroid gland appears within normal limits.   ABDOMEN:   LIVER: The liver is normal in size. Similar hypoattenuating lesions with the largest measuring 2 cm segment 4A (series 2, image 71).   BILE DUCTS: The bile ducts are not dilated.   GALLBLADDER: The gallbladder is not distended. Calcified stones are noted.   PANCREAS: The pancreas appears unremarkable.   SPLEEN: Within normal limits.   ADRENAL GLANDS: Bilateral adrenal glands appear normal.   KIDNEYS AND URETERS: Bilateral mildly atrophic kidneys with  mild perinephric stranding similar to prior. There is a 0.6 cm left midpole nonobstructing renal calculi. No hydroureteronephrosis. No right nephroureterolithiasis.   PELVIS:   BLADDER: The urinary bladder is underdistended with Forrest catheter in place. There is air in the urinary bladder likely from the Forrest.   REPRODUCTIVE ORGANS: No pelvic masses.   BOWEL: NG tube body of the stomach. Otherwise, the stomach is unremarkable. The small is normal in caliber and demonstrate no wall thickening. Proximal sigmoid diverticulosis with interval improvement in the mild wall thickening and adjacent fat stranding involving the proximal sigmoid colon (series 902, image 93). Interval insertion of rectal tube.   VESSELS: Mild atherosclerosis of the abdominal aorta and its branching vessels. There is no aneurysmal dilatation of the abdominal aorta..   Interval flattening of the inferior vena cava (image 2, image 121)/   PERITONEUM/RETROPERITONEUM/LYMPH NODES: No ascites or free air, no fluid collection.  The retroperitoneum appears unremarkable.  No enlarged mesenteric lymph nodes.   ABDOMINAL WALL: Within normal limits.   BONES: No suspicious osseous lesions are present. Degenerative discogenic disease is noted in the lower thoracic and lumbar spine most severe at L3-L4, L4-L5, and L5-L6..       Chest 1. Interval improvement of the right lower lobe patchy infiltrate. 2. Again seen left lung collapse, left pleural thickening with invasion into the left posterior chest wall, and small left loculated pleural effusion consistent with patient's known mesothelioma and is unchanged when compared to prior CT from 05/18/2024. 3. Unchanged prominent mediastinal and perihilar lymph nodes. 4. Previously noted left pulmonary artery thrombus is not well evaluated on this exam due to beam hardening artifact, positioning of patient's arms, and lack of intravenous contrast.   Abdomen-Pelvis 1. Interval flattening of the inferior vena cava which  may be seen with hypovolemia. Recommend correlation with patient's fluid volume status. 2. Improved proximal sigmoid diverticulitis. 3. Additional unchanged findings as noted above.   I personally reviewed the images/study and I agree with the findings as stated by Patrice Aguilar DO, PGY-2. This study was interpreted at [facility], [place], [place].   MACRO: None   Signed by: Sohail Torres 5/23/2024 12:22 AM Dictation workstation:   [id number]    Transthoracic Echo (TTE) Complete    Result Date: 5/21/2024   [facility], [address] [place] [address]                Tel [phone] and Fax [phone] TRANSTHORACIC ECHOCARDIOGRAM REPORT  Patient Name:      ZAKIYA SHAHEED LAWSON       Reading Physician:    [id number] Miller MD Study Date:        5/21/2024            Ordering Provider:    [id number] CHAMPAGNE MRN/PID:           [id number]             Fellow: Accession#:        [id number]         Nurse: Date of Birth/Age: 1935 / 88 years Sonographer:          Dionisio Braga RDCS Gender:            M                    Additional Staff: Height:            172.72 cm            Admit Date: Weight:            109.77 kg            Admission Status:     Inpatient -                                                               Routine BSA / BMI:         2.22 m2 / 36.80      Encounter#:           [id number]                    kg/m2                                         Department Location:  [place] [address] Blood Pressure: 96 /57 mmHg Study Type:    TRANSTHORACIC ECHO (TTE) COMPLETE Diagnosis/ICD: Unspecified atrial fibrillation-I48.91; Hypertensive heart                disease with heart failure-I11.0 Indication:    hypoxic resp failure w/ new o2 requirement c/f HF CPT  Code:      Echo Complete w Full Doppler-[id number] Patient History: Pertinent History: PAD, afib/flutter, hx DVT on warfarin, HTN, CAD s/p stent,                    mitral regurg, HLD,JOVANNI, basal cell ca on face, s/p removal                    and radiation of malignant mesothelioma, S/p L VATS with                    decort 5/2022. Study Detail: The following Echo studies were performed: 2D, M-Mode, color flow               and Doppler. Technically challenging study due to body habitus,               patient lying in supine position, poor acoustic windows and               prominent lung artifact. Definity used as a contrast agent for               endocardial border definition. Total contrast used for this               procedure was 2.0 mL via IV push. Unable to obtain suprasternal               notch view.  PHYSICIAN INTERPRETATION: Left Ventricle: The left ventricular systolic function is hyperdynamic, with an estimated ejection fraction of 70-75%. The patient is in atrial fibrillation which may influence the estimate of left ventricular function and transvalvular flows. There are no regional wall motion abnormalities. The left ventricular cavity size is normal. Left ventricular diastolic filling was not assessed. Left Atrium: The left atrium was not well visualized. Right Ventricle: The right ventricle was not well visualized. Unable to determine right ventricular systolic function. Right Atrium: The right atrium was not well visualized. Aortic Valve: The aortic valve is trileaflet. There is trivial aortic valve regurgitation. The peak instantaneous gradient of the aortic valve is 13.2 mmHg. Mitral Valve: The mitral valve is normal in structure. There is mild mitral annular calcification. There is trace mitral valve regurgitation. Tricuspid Valve: The tricuspid valve was not well visualized. Tricuspid regurgitation was not well visualized. Tricuspid regurgitation was not assessed. The Doppler estimated RVSP is  mildly elevated at 36.6 mmHg. Pulmonic Valve: The pulmonic valve is not well visualized. There is physiologic pulmonic valve regurgitation. Pericardium: There is a trivial pericardial effusion. Aorta: The aortic root is abnormal. The aortic root appears moderately dilated and measures 4.50 cm. The Asc Ao is 3.70 cm. There is mild dilatation of the ascending aorta. Systemic Veins: The inferior vena cava appears to be of normal size. There is IVC inspiratory collapse greater than 50%. In comparison to the previous echocardiogram(s): Compared with study from 5/3/2022, the previous study was a MARY during a MARY/DCCV and comparison is limited.  CONCLUSIONS:  1. Poorly visualized anatomical structures due to suboptimal image quality.  2. Left ventricular systolic function is hyperdynamic with a 70-75% estimated ejection fraction.  3. The patient is in atrial fibrillation which may influence the estimate of left ventricular function and transvalvular flows.  4. Mildly elevated RVSP.  5. Moderately dilated aortic root. QUANTITATIVE DATA SUMMARY: 2D MEASUREMENTS:                          Normal Ranges: Ao Root d:     4.50 cm   (2.0-3.7cm) LAs:           2.50 cm   (2.7-4.0cm) IVSd:          0.80 cm   (0.6-1.1cm) LVPWd:         0.80 cm   (0.6-1.1cm) LVIDd:         4.10 cm   (3.9-5.9cm) LVIDs:         2.20 cm LV Mass Index: 43.9 g/m2 LV % FS        46.3 % AORTA MEASUREMENTS:                    Normal Ranges: Asc Ao, d: 3.70 cm (2.1-3.4cm) LV SYSTOLIC FUNCTION BY 2D PLANIMETRY (MOD):                     Normal Ranges: EF-A4C View: 73.6 % (>=55%) EF-A2C View: 80.4 % EF-Biplane:  78.8 % LV DIASTOLIC FUNCTION:                     Normal Ranges: MV Peak E: 1.09 m/s (0.7-1.2 m/s) MV DT:     245 msec (150-240 msec) MITRAL VALVE:                 Normal Ranges: MV DT: 245 msec (150-240msec) AORTIC VALVE:                          Normal Ranges: AoV Vmax:      1.82 m/s  (<=1.7m/s) AoV Peak P.2 mmHg (<20mmHg) LVOT Max Philipp:  1.42 m/s   (<=1.1m/s) LVOT VTI:      18.80 cm LVOT Diameter: 2.00 cm   (1.8-2.4cm) AoV Area,Vmax: 2.45 cm2  (2.5-4.5cm2)  RIGHT VENTRICLE: TAPSE: 15.8 mm RV s'  0.18 m/s TRICUSPID VALVE/RVSP:                             Normal Ranges: Peak TR Velocity: 2.90 m/s RV Syst Pressure: 36.6 mmHg (< 30mmHg) PULMONIC VALVE:                      Normal Ranges: PV Max Philipp: 0.9 m/s  (0.6-0.9m/s) PV Max PG:  3.2 mmHg  [id number] Delvis Miller MD Electronically signed on 5/21/2024 at 5:46:15 PM  ** Final **     XR abdomen 1 view    Result Date: 5/21/2024  Interpreted By:  Jovany Nuñez, STUDY: XR ABDOMEN 1 VIEW;  5/21/2024 12:58 pm   INDICATION: Signs/Symptoms:post ngt.   COMPARISON: One-view abdomen 05/21/2024 13 a.m.   ACCESSION NUMBER(S): [id number]   ORDERING CLINICIAN: SUN CHAMPAGNE   FINDINGS: One-view abdomen   An enteric tube is positioned within the region of the gastric fundus several cm below the expected gastroesophageal junction. There is a predominantly fluid-filled small bowel and colon pattern. There is less gastric distention with air.   Opacification of the left thorax and multiple air bronchograms are noted.       1.  The enteric tube is positioned within the gastric fundus 2. Advancement of the tube would be recommended for more optimal positioning 3. Predominantly fluid-filled small bowel and colon pattern 4. Opacification of the visualized left thorax and multiple air bronchograms within the left lung   MACRO: None   Signed by: Jovany Nuñez 5/21/2024 2:19 PM Dictation workstation:   [id number]    XR abdomen 1 view    Result Date: 5/21/2024  Interpreted By:  Jovany Nuñez, STUDY: XR ABDOMEN 1 VIEW;  5/21/2024 11:13 am   INDICATION: Signs/Symptoms:ABD DISTENTION.   COMPARISON: None.   ACCESSION NUMBER(S): [id number]   ORDERING CLINICIAN: SUN CHAMPAGNE   FINDINGS: The stomach is distended with air. There is a predominantly fluid-filled small bowel and colon pattern. Gas is demonstrated within the colon and distal  ileum. The left ventricular pacemaker electrode appears in adequate position. The left ventricle appears mildly enlarged. Osseous and articular anatomy is normal for age.       1.  The stomach is moderately distended with air otherwise, nonspecific predominantly fluid-filled bowel pattern   MACRO: None   Signed by: Jovany Nuñez 5/21/2024 2:17 PM Dictation workstation:   [id number]    XR chest 1 view    Result Date: 5/21/2024  Interpreted By:  Jovany Nuñez  and Guicho Castro STUDY: XR CHEST 1 VIEW;  5/21/2024 6:14 am   INDICATION: Signs/Symptoms:SOB.   COMPARISON: Chest radiograph 05/16/2024 CT chest 05/18/2024   ACCESSION NUMBER(S): [id number]   ORDERING CLINICIAN: SUN CHAMPAGNE   FINDINGS: AP radiograph of the chest was provided.   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   CARDIOMEDIASTINAL SILHOUETTE: Cardiomediastinal silhouette is stable in size with complete obscuration of the left cardiomediastinal border.   LUNGS: Persistent near-complete opacification of the left hemithorax with interval reduced air bronchograms compared to prior. Persistent patchy right infrahilar airspace opacities are noted. Prominent perihilar and interstitial lung markings are noted on the right. No consolidation, effusion, or pneumothorax on the right.   ABDOMEN: No remarkable upper abdominal findings.   BONES: No acute osseous changes.       1. Virtually complete opacification of the left hemithorax with interval reduced air bronchograms. 2. Persistent patchy right infrahilar airspace opacities which may reflect aspiration changes and/or pneumonia in the appropriate clinical setting.   I personally reviewed the images/study and I agree with the findings as stated by Matthew Lindo MD. This study was interpreted at [facility], [place], [place].   MACRO: None   Signed by: Jovany Nuñez 5/21/2024 11:26 AM  Dictation workstation:   [id number]    XR abdomen 1 view    Result Date: 5/20/2024  Interpreted By:  Sofya Ahumada, STUDY: XR ABDOMEN 1 VIEW;  5/19/2024 10:42 pm   INDICATION: Signs/Symptoms:abdominal distension.   COMPARISON: CT: 05/18/2024   ACCESSION NUMBER(S): [id number]   ORDERING CLINICIAN: SUN CHAMPAGNE   FINDINGS: Nonobstructive bowel gas pattern. Limited evaluation of pneumoperitoneum on supine imaging, however no gross evidence of free air is noted.   Whiteout throughout the left hemithorax.   Osseous structures demonstrate no acute bony changes.       1.  Nonspecific and nonobstructive gas pattern.   MACRO: None   Signed by: Sofya Ahumada 5/20/2024 8:47 AM Dictation workstation:   [id number]    CT chest abdomen pelvis w IV contrast    Result Date: 5/18/2024  Interpreted By:  Collins Conde, STUDY: CT CHEST ABDOMEN PELVIS W IV CONTRAST;  5/18/2024 8:38 am   INDICATION: Signs/Symptoms:abd pain, LLQ TTP.   COMPARISON: Chest CT scan 12/10/2020. Chest and abdomen CT scan dated 06/14/2023.   ACCESSION NUMBER(S): [id number]   ORDERING CLINICIAN: THEA MURRAY   TECHNIQUE: CT of the chest, abdomen, and pelvis was performed.  Contiguous axial images were obtained at 3 mm slice thickness through the chest, abdomen and pelvis. Coronal and sagittal reconstructions at 3 mm slice thickness were performed. 75 ml of contrast Omnipaque were administered intravenously without immediate complication.   FINDINGS: CHEST:   LUNG/PLEURA/LARGE AIRWAYS: Significant collapse of the left lung with heterogenous appearance, pleural thickening and small loculated pleural fluid measuring 7.8 cm consistent with the patient's known mesothelioma. Focus of left posterior chest wall extension (series 201, image 77 and image 89) at the level of the 9th 10th and 10th 11th rib spaces   Right lower lobe ill-defined patchy infiltrate measuring 2.3 x 1.4 cm. Trace right-sided pleural effusion with surrounding atelectasis.    Redemonstrated left hilar ill-defined enhancing fullness appears extending to the left pulmonary trunk possibly tumor thrombus and felt less likely bland thrombus.   VESSELS: The aforementioned left hilar ill-defined soft tissue fullness appears extending to the left pulmonary trunk with almost 50% occlusion. Mild coronary artery calcifications   HEART: The heart is normal in size.  Trace pericardial effusion. Right chest wall pacemaker noted.   MEDIASTINUM AND ANDREA: Multiple prominent mediastinal lymph nodes in the largest in the subcarinal region measuring 1.6 cm. The esophagus is normal.   CHEST WALL AND LOWER NECK: The soft tissues of the chest wall demonstrate no gross abnormality. The visualized thyroid gland appears within normal limits.   ABDOMEN:   LIVER: Normal size. Normal contour. Stable hypoattenuating lesions likely benign in the largest segment 4A measuring 2 cm.   BILE DUCTS: The intrahepatic and extrahepatic ducts are not dilated.   GALLBLADDER: Cholelithiasis.   PANCREAS: The pancreas appears unremarkable without evidence of ductal dilatation or masses.   SPLEEN: The spleen is normal in size.   ADRENAL GLANDS: Bilateral adrenal glands appear normal.   KIDNEYS AND URETERS: The kidneys are normal in size and enhance symmetrically.  No hydroureteronephrosis. Nonobstructive left midpole 0.6 cm calculus.   PELVIS:   BLADDER: Underdistended with Forrest catheter in place.   REPRODUCTIVE ORGANS: No pelvic masses.   BOWEL: The stomach, small and large bowel are normal in appearance without wall thickening or obstruction. Proximal sigmoid diverticulosis with mild wall thickening could be related to episodes of underlying mild diverticulitis. No ascites. Pneumoperitoneum.     VESSELS: Mild vascular calcifications and atherosclerotic changes.   PERITONEUM/RETROPERITONEUM/LYMPH NODES: No enlarged intra abdominopelvic lymphadenopathy.   BONE AND SOFT TISSUE: Multilevel degenerative spine changes and facet joint  disease. Trace soft tissue thickening at the level of the umbilicus.       CHEST: 1. Significant collapse of the left lung with heterogenous appearance, pleural thickening and small loculated pleural fluid measuring 7.8 cm consistent with the patient's known mesothelioma which have worsened from the prior CT scan dated 06/14/2023 and grossly unchanged from 12/10/2023 unenhanced CT scan allowing for differences in techniques. Focus of new left posterior chest wall invasion noted at the level of 9th 10th and 10th 11th rib spaces. 2. Right lower lobe ill-defined patchy infiltrate measuring 2.3 x 1.4 cm. Trace right-sided pleural effusion with surrounding atelectasis. 3. Redemonstrated left hilar ill-defined enhancing fullness appears extending to the left pulmonary trunk likely tumor thrombus and felt less likely bland thrombus which has significantly worsened from 06/14/2023 CT scan. 4. Mildly enlarged multiple mediastinal lymph nodes in the largest in the subcarinal region measuring 1.6 cm, grossly unchanged from prior.   ABDOMEN-PELVIS: 1. Proximal sigmoid diverticulosis with mild wall thickening could be related to episodes of underlying mild uncomplicated diverticulitis 2. Other ancillary findings are unchanged.   The significant results of the study were relayed by me to and acknowledged by Janes Nichols NP on 05/18/2024 at 10:40 a.m. over the phone.   MACRO: None   Signed by: Collins Conde 5/18/2024 10:47 AM Dictation workstation:   [id number]    US renal complete    Result Date: 5/17/2024  Interpreted By:  Karel Lowry, STUDY: US RENAL COMPLETE;  5/17/2024 8:02 pm   INDICATION: Signs/Symptoms:Urinary retention?.   COMPARISON: CT abdomen pelvis 05/16/2024   ACCESSION NUMBER(S): [id number]   ORDERING CLINICIAN: KEMI AUGUSTINE   TECHNIQUE: Grayscale and color Doppler sonographic images of the kidneys.   FINDINGS:     RIGHT KIDNEY: The right kidney measures 11.3 cm in length. Diffuse renal cortical  thinning. No hydronephrosis. No renal calculus. No perinephric fluid.   LEFT KIDNEY: The left kidney measures 11.2 cm in length. Diffuse renal cortical thinning. No hydronephrosis. There is a 1 cm nonobstructing left renal calculus. No perinephric fluid.   URINARY BLADDER: Urinary bladder is decompressed, limiting its evaluation, with Forrest catheter in place.       1. Diffuse bilateral renal cortical thinning in keeping with chronic renal disease. No hydronephrosis. 2. Nonobstructing 1 cm left renal calculus. 3. Decompressed urinary bladder due to Forrest catheter, limiting its evaluation.   MACRO: None.   Signed by: Karel Lowry 5/17/2024 10:07 PM Dictation workstation:   [id number]    Radiology:  ECG 12 lead    Result Date: 6/15/2024  Accelerated Junctional rhythm Low voltage QRS Nonspecific ST abnormality Abnormal ECG When compared with ECG of 30-MAY-2024 08:33, Junctional rhythm has replaced Atrial flutter See ED provider note for full interpretation and clinical correlation Confirmed by Chavo Cid ([id number]) on 6/15/2024 11:11:58 AM    US scrotum w doppler    Result Date: 6/14/2024  Interpreted By:  Julieta Callahan, STUDY: US SCROTUM WITH DOPPLER; 6/14/2024 4:37 pm   INDICATION: Swelling, pain, and erythema.   COMPARISON: None.   ACCESSION NUMBER(S): [id number]   ORDERING CLINICIAN: QUE GUZMAN   TECHNIQUE: Gray scale and color doppler imaging of the scrotum was performed with spectral waveform analysis. Arterial inflow and venous outflow documented. Duplex Doppler interrogation including color flow and spectral waveform analysis is performed.   FINDINGS: Right testis: 3.4 x 2.0 x 3.2 cm.   Left testis: 4.9 x 2.9 x 3.0 cm.   Epididymides: The epididymides are normal in size and echogenicity. There is a 2 x 3 x 3 mm cyst within right epididymal head.   The testes appear homogeneous with no intratesticular lesions. Ectasia of the rete testes on the right is present. Duplex Doppler interrogation of each testicle  including color flow and spectral waveform analysis shows normal arterial and venous flow without torsion or orchitis.   There is a 3.6 x 6.0 x 3.0 encapsulated fluid collection anterior and superior to the right testicle containing low-level internal echoes and debris. This does not arise from the right epididymis. This may represent a scrotal wall abscess. There appears to be diffuse thickening of the scrotal wall.       3.0 x 3.6 x 6.0 cm encapsulated fluid collection noted which is extratesticular in location and is seen anterior and superior to the right testicle. This contains low-level internal echoes and some thickening of its wall with internal debris. This may represent a scrotal abscess. Clinical correlation is necessary.   Ectasia of the rete testes on the right.   3 mm cyst right epididymal head.   No epididymal orchitis.   MACRO: None.   Signed by: Julieta Callahan 6/14/2024 4:59 PM Dictation workstation:   [id number]    XR chest 2 views    Result Date: 6/14/2024  Interpreted By:  Josh Soto, STUDY: XR CHEST 2 VIEWS;  6/14/2024 3:10 pm   INDICATION: Signs/Symptoms:Cough.   COMPARISON: 05/28/2024   ACCESSION NUMBER(S): [id number]   ORDERING CLINICIAN: WES ORTIZ   FINDINGS: Pacemaker leads are intact and properly positioned.       CARDIOMEDIASTINAL SILHOUETTE: Cardiomediastinal silhouette is normal in size and configuration.   LUNGS: There is complete opacification left hemithorax with volume loss and shift of the mediastinum to the left.   Since the prior examination further increase in density of the right lung base which may suggest atelectasis or infiltrate. Correlation with auscultation and inflammatory markers recommended. A small to moderate on right pleural effusion persists.   ABDOMEN: No remarkable upper abdominal findings.   BONES: No acute osseous changes.       1.  Complete opacification with volume loss left hemithorax. Increased density right lung base reflecting small to moderate  right pleural effusion. Superimposed pneumonia can not be excluded.       MACRO: None   Signed by: Josh Soto 6/14/2024 3:36 PM Dictation workstation:   [id number]    CT abdomen pelvis wo IV contrast    Result Date: 6/14/2024  Interpreted By:  Schoenberger, Joseph, STUDY: CT ABDOMEN PELVIS WO IV CONTRAST;  6/14/2024 2:55 pm   INDICATION: Signs/Symptoms:Scrotal swelling, erythema, erythema extending up into the abdomen.   COMPARISON: 05/22/2024   ACCESSION NUMBER(S): [id number]   ORDERING CLINICIAN: WES ORTIZ   TECHNIQUE: CT of the abdomen and pelvis was performed. Contiguous axial images were obtained at 3 mm slice thickness through the abdomen and pelvis. Coronal and sagittal reconstructions at 3 mm slice thickness were performed.  No intravenous or oral contrast agents were administered.   FINDINGS: Please note that the evaluation of vessels, lymph nodes and organs is limited without intravenous contrast.   LOWER CHEST: There is some persistent volume loss in the left lower lobe with persistent consolidation. There are few air bronchograms. There is rounded collection of fluid at the anterior aspect of this process which may relate to either necrotizing pneumonia or loculated pleural fluid or conceivably pulmonary abscess. However this is also unchanged from the prior. There is a however, a new moderate dependent layering right pleural effusion.   ABDOMEN:   LIVER: 2 cysts located in the superior aspect the left liver lobe are stable from the prior. No other focal abnormality.   BILE DUCTS: No dilation   GALLBLADDER: Dependent layering calcified gallstones. No wall thickening or pericholecystic fluid.   PANCREAS: Unremarkable   SPLEEN: Unremarkable   ADRENAL GLANDS: Bilateral adrenal glands appear normal.   KIDNEYS AND URETERS: The kidneys are normal in size and unremarkable in appearance.  No hydroureteronephrosis or nephroureterolithiasis is identified. 5 mm nonobstructing lower pole right renal  stone unchanged.   PELVIS:   BLADDER: High choose 1   REPRODUCTIVE ORGANS: Unremarkable   BOWEL: The stomach is unremarkable.  Small bowel loops are normal in caliber without mural thickening. Colon is normal in caliber. There is a mobile cecum. There are multiple colonic diverticula. There is no convincing evidence for acute diverticulitis. Normal appendix.   VESSELS: There is no aneurysmal dilatation of the abdominal aorta. The IVC appears normal.   PERITONEUM/RETROPERITONEUM/LYMPH NODES: There is no free or loculated fluid collection, no free intraperitoneal air. The retroperitoneum appears normal.  No abdominopelvic lymphadenopathy is present. The   ABDOMINAL WALL: There is soft tissues Amanda in the abdominal wall which is new from the prior. This is a set metric Roseann more pronounced on the left than on the right. This extends into the lower abdominal left-sided pannus. It does not appear to overtly involve the scrotum. There may be loculated right-sided hydrocele.   BONES: No suspicious osseous lesions are identified. 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       1.  The high significant development of abdominal wall edema in the subcutaneous tissues asymmetrically worse on the left. This does not appear to especially extend into the scrotum. 2. There is a new moderate right pleural effusion. 3. Persistent left basal consolidation with anterior oval shaped collection of fluid with similar appearance to prior other than lack of air bronchograms on this exam compared to the prior. Associated volume loss.     MACRO: None   Signed by: Joseph Schoenberger 6/14/2024 3:30 PM Dictation workstation:   [id number]     Current Facility-Administered Medications:     acetaminophen (Tylenol) tablet 650 mg, 650 mg, oral, q4h PRN **OR** acetaminophen (Tylenol) oral liquid 650 mg, 650 mg, nasogastric tube, q4h PRN **OR** acetaminophen (Tylenol) suppository 650 mg, 650 mg, rectal, q4h PRN,  Vipul Sotelo MD    albuterol 2.5 mg /3 mL (0.083 %) nebulizer solution 2.5 mg, 2.5 mg, nebulization, q6h PRN, Deondre Wagner MD    benzonatate (Tessalon) capsule 100 mg, 100 mg, oral, TID PRN, Vipul Sotelo MD, 100 mg at 06/15/24 1938    cefepime (Maxipime) in dextrose 5% 50 mL IV 1 g, 1 g, intravenous, q12h, Vipul Sotelo MD, 1 g at 06/16/24 0936    codeine-guaifenesin (Robitussin-AC)  mg/5 mL syrup 5 mL, 5 mL, oral, q6h PRN, Deondre Wagner MD, 5 mL at 06/16/24 1230    dextromethorphan (Delsym) 30 mg/5 mL liquid 30 mg, 30 mg, oral, Nightly PRN, Vipul Sotelo MD, 30 mg at 06/16/24 0308    dilTIAZem (Cardizem) 125 mg in dextrose 5% 125 mL (1 mg/mL) infusion (premix), 5 mg/hr, intravenous, Continuous, Deondre Wagner MD, Last Rate: 5 mL/hr at 06/16/24 1503, 5 mg/hr at 06/16/24 1503    [Held by provider] enoxaparin (Lovenox) syringe 120 mg, 1 mg/kg, subcutaneous, q24h, Vipul Sotelo MD, 120 mg at 06/15/24 2041    melatonin tablet 3 mg, 3 mg, oral, Nightly PRN, Vipul Sotelo MD, 3 mg at 06/16/24 0435    metoprolol tartrate (Lopressor) tablet 25 mg, 25 mg, oral, BID, Chavo Cid MD, 25 mg at 06/16/24 1346    metroNIDAZOLE (Flagyl) 500 mg in NaCl (iso-os) 100 mL, 500 mg, intravenous, q8h, Deondre Wagner MD, Stopped at 06/16/24 1754    ondansetron (Zofran) tablet 4 mg, 4 mg, oral, q8h PRN **OR** ondansetron (Zofran) injection 4 mg, 4 mg, intravenous, q8h PRN, Vipul Sotelo MD    [DISCONTINUED] pantoprazole (ProtoNix) EC tablet 40 mg, 40 mg, oral, Daily before breakfast, 40 mg at 06/16/24 0435 **OR** pantoprazole (ProtoNix) injection 40 mg, 40 mg, intravenous, BID, Deondre Wagner MD    polyethylene glycol (Glycolax, Miralax) packet 17 g, 17 g, oral, Daily PRN, Vipul Sotelo MD    rosuvastatin (Crestor) tablet 10 mg, 10 mg, oral, Daily, Deondre Wagner MD, 10 mg at 06/16/24 0825    tamsulosin (Flomax) 24 hr capsule 0.4 mg, 0.4 mg, oral, Daily, Deondre Wagner MD, 0.4 mg at 06/16/24 0825     traMADol (Ultram) tablet 50 mg, 50 mg, oral, q8h PRN, Deondre Wagnre MD, 50 mg at 06/16/24 1035    vancomycin (Vancocin) in dextrose 5 % water (D5W) 100 mL  mg, 500 mg, intravenous, q24h, Carol Gonzalez, PharmD, Stopped at 06/16/24 1832    vancomycin (Vancocin) pharmacy to dose - pharmacy monitoring, , miscellaneous, Daily PRN, Kati Miller, [facility]   Principal Problem:    Cellulitis of scrotum  Active Problems:    Atrial fibrillation with RVR (Multi)    Benign essential hypertension    Acute on chronic respiratory failure with hypoxia (Multi)    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Multi)    Recurrent pleural effusion    Abdominal wall cellulitis      Assessment/Plan    Extensive cellulitis and edema of the abdominal wall and lower extremities and scrotum 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  Patient recently had clinical trial of chemo and patient did not tolerate.  No further plan for advanced mesothelioma.  Right pleural effusion .patient does not want any thoracentesis and want to be treated for cellulitis now.  Acute on chronic respiratory failure with hypoxia.  COPD continue bronchodilator.  Former smoker.  Morbid obesity.  Obstructive sleep apnea.  History of A-fib with RVR.  Hypertension.  Dyslipidemia.  GERD.  Patient DNR.  DVT prophylaxis.  PT OT.  P.o. diet.  6/15/2024 patient's leg and abdominal swelling and redness improving.  Mesothelioma is advanced and not able to tolerate any clinical trial chemo.  Patient recently was seen at [facility].  6/16/2024 patient has intermittent A-fib RVR.  Patient seen by EP Dr. 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  Advanced history of mesothelioma with completely  whiteout left lung pulm decortication and surgery and thoracentesis in the past.  Patient recently had clinical trial of chemo and patient did not tolerate.  No further plan for advanced mesothelioma.  Right pleural effusion .patient does not want any thoracentesis and want to be treated for cellulitis now.  Acute on chronic respiratory failure with hypoxia.  COPD continue bronchodilator.  Former smoker.  Morbid obesity.  Obstructive sleep apnea.  History of A-fib with RVR.  Hypertension.  Dyslipidemia.  GERD.  Patient DNR.  DVT prophylaxis.  PT OT.  P.o. diet.  6/15/2024 patient's leg and abdominal swelling and redness improving.  Mesothelioma is advanced and not able to tolerate any clinical trial chemo.  Patient recently was seen at [facility] [place].  . 6/16?24 Scrotal abscesses/hydrocele patient seen by urology plan to continue IV antibiotic patient clinically improving.  Advanced mesothelioma ,no further treatment plan for chemo.  Right pleural effusion patient does not want thoracentesis or an aggressive treatment  Luis Villasenor MD

## 2024-06-16 NOTE — PROGRESS NOTES
[facility] Progress Note               Rounding Cardiologist:  Chavo Cid MD, MD   Primary Cardiologist: Dr. Chavo Cid    Date:  6/16/2024  Patient:  Vinicius Arriola  YOB: 1935  MRN:  [id number]   Admit Date:  6/14/2024      SUBJECTIVE    Vinicius Arriola was seen and examined today at bedside.   Patient is feeling weak and short of breath  Telemetry shows atrial fibrillation rates between  bpm    Laboratory data today show sodium 140 potassium 4.2 chloride 109 bicarbonate 23 BUN 46 creatinine 3.22 magnesium 1.56 AST 13 ALT 28 .  WBC 4.8 hemoglobin 7.5 hematocrit 24.3 platelet count 143  Patient is on Lovenox subcu therapeutic dose    VITALS     Vitals:    06/15/24 1947 06/16/24 0055 06/16/24 0339 06/16/24 0756   BP: 129/78 110/71 93/58 124/60   BP Location: Left arm   Left arm   Patient Position: Lying   Lying   Pulse: 101 (!) 126  (!) 118   Resp: 16 18  18   Temp: 36.7 °C (98.1 °F) 36.2 °C (97.2 °F) 36.4 °C (97.5 °F) 36.9 °C (98.4 °F)   TempSrc: Temporal Temporal  Tympanic   SpO2: 98% 97%  96%   Weight:       Height:           Intake/Output Summary (Last 24 hours) at 6/16/2024 1122  Last data filed at 6/16/2024 0938  Gross per 24 hour   Intake 579.83 ml   Output 1900 ml   Net -1320.17 ml       [unfilled]    PHYSICAL EXAM   Constitutional:       Appearance: He is obese. He is ill-appearing. He is not diaphoretic.   HENT:      Head: Normocephalic and atraumatic.      Nose: Nose normal.      Mouth/Throat:      Mouth: Mucous membranes are dry.   Eyes:      General: No scleral icterus.     Extraocular Movements: Extraocular movements intact.      Conjunctiva/sclera: Conjunctivae normal.      Pupils: Pupils are equal, round, and reactive to light.   Neck:      Vascular: No carotid bruit.   Cardiovascular:      Rate and Rhythm: Tachycardia present. Rhythm irregular.      Heart sounds: No murmur heard.  Pulmonary:      Breath sounds: Wheezing, rhonchi and rales present.      Comments:  Diminished air entry at the bases bilaterally with crackles.  Abdominal:      General: There is distension.      Tenderness: There is no abdominal tenderness. There is no right CVA tenderness, left CVA tenderness or guarding.      Comments: Patient does have abdominal wall edema with evidence of cellulitis involving the left side of the abdominal wall, small area on the right side, small area on the back and also the groin area.   Musculoskeletal:         General: No swelling, tenderness, deformity or signs of injury. Normal range of motion.      Cervical back: Normal range of motion and neck supple. No rigidity or tenderness.      Right lower leg: Edema present.      Left lower leg: Edema present.   Skin:     General: Skin is warm and dry.      Findings: Erythema and rash present.   Neurological:      General: No focal deficit present.      Mental Status: He is alert and oriented to person, place, and time. Mental status is at baseline.   Psychiatric:         Mood and Affect: Mood normal.     DIAGNOSTIC RESULTS   EKG:     Telemetry: Atrial fibrillation rates between  bpm.      LAB DATA   BMP:  @LABRCNT(Na:3,K:3,CL:3,CO2:3,Bun:3,Creatinine:3,Glu:3, CA:3,LABGLOM)@    Cardiac Enzymes:  @LABRCNT(CKTOTAL:3,CKMB:3,CKMBINDEX:3,TROPONINI:3)@    CBC:   Lab Results   Component Value Date    WBC 4.8 06/16/2024    RBC 2.57 (L) 06/16/2024    HGB 7.5 (L) 06/16/2024    HCT 24.3 (L) 06/16/2024     (L) 06/16/2024       CMP:    Lab Results   Component Value Date     06/16/2024    K 4.6 06/16/2024     (H) 06/16/2024    CO2 23 06/16/2024    BUN 46 (H) 06/16/2024    CREATININE 3.22 (H) 06/16/2024    GLUCOSE 104 (H) 06/16/2024    CALCIUM 7.8 (L) 06/16/2024       Hepatic Function Panel:    Lab Results   Component Value Date    ALKPHOS 59 06/15/2024    ALT 13 06/15/2024    AST 28 06/15/2024    PROT 5.8 (L) 06/15/2024    BILITOT 0.4 06/15/2024       Magnesium:    Lab Results   Component Value Date    MG 1.56 (L)  "06/16/2024       PT/INR:  No results found for: \"PROTIME\", \"INR\"  @LABRCNT(inr:3)@     HgBA1c:  No components found for: \"LABA1C\"    Lipid Profile:  No results found for: \"CHLPL\", \"TRIG\", \"HDL\", \"LDLCALC\", \"LDLDIRECT\"    TSH:  No results found for: \"TSH\"    ABG:  No results found for: \"PH\"    PRO-BNP: No results found for: \"PROBNP\"       RADIOLOGY     US scrotum w doppler   Final Result   3.0 x 3.6 x 6.0 cm encapsulated fluid collection noted which is   extratesticular in location and is seen anterior and superior to the   right testicle. This contains low-level internal echoes and some   thickening of its wall with internal debris. This may represent a   scrotal abscess. Clinical correlation is necessary.        Ectasia of the rete testes on the right.        3 mm cyst right epididymal head.        No epididymal orchitis.        MACRO:   None.        Signed by: Julieta Callahan 6/14/2024 4:59 PM   Dictation workstation:   [id number]      XR chest 2 views   Final Result   1.  Complete opacification with volume loss left hemithorax.   Increased density right lung base reflecting small to moderate right   pleural effusion. Superimposed pneumonia can not be excluded.                  MACRO:   None        Signed by: Josh Soto 6/14/2024 3:36 PM   Dictation workstation:   [id number]      CT abdomen pelvis wo IV contrast   Final Result   1.  The high significant development of abdominal wall edema in the   subcutaneous tissues asymmetrically worse on the left. This does not   appear to especially extend into the scrotum.   2. There is a new moderate right pleural effusion.   3. Persistent left basal consolidation with anterior oval shaped   collection of fluid with similar appearance to prior other than lack   of air bronchograms on this exam compared to the prior. Associated   volume loss.             MACRO:   None        Signed by: Joseph Schoenberger 6/14/2024 3:30 PM   Dictation workstation:   [id number]    "       @[facility]@      CURRENT MEDICATIONS    cefepime, 1 g, intravenous, q12h  enoxaparin, 1 mg/kg, subcutaneous, q24h  metroNIDAZOLE, 500 mg, intravenous, q8h  pantoprazole, 40 mg, oral, Daily before breakfast   Or  pantoprazole, 40 mg, intravenous, Daily before breakfast  rosuvastatin, 10 mg, oral, Daily  tamsulosin, 0.4 mg, oral, Daily  vancomycin, 500 mg, intravenous, q24h      dilTIAZem, 10 mg/hr, Last Rate: 10 mg/hr (06/16/24 0848)          ASSESSMENT   Principal Problem:    Cellulitis of scrotum  Active Problems:    Atrial fibrillation with RVR (Multi)    Benign essential hypertension    Acute on chronic respiratory failure with hypoxia (Multi)    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Multi)    Recurrent pleural effusion    Abdominal wall cellulitis        Patient Active Problem List   Diagnosis    Arthritis    Atherosclerosis of native artery of both lower extremities with intermittent claudication (CMS-HCC)    Atrial fibrillation with RVR (Multi)    Atrial flutter (Multi)    Sinus node dysfunction (Multi)    Benign essential hypertension    Bradycardia    CAD (coronary atherosclerotic disease)    Chest pain    Difficulty breathing    Excessive daytime sleepiness    Fracture of lateral malleolus of right ankle    H/O malignant mesothelioma    Intermittent claudication (CMS-HCC)    Knee pain, right    Mesothelioma, malignant (Multi)    Mitral regurgitation    Mixed hyperlipidemia    Pacemaker    Pain and swelling of left lower extremity    Knee osteoarthritis    Patellofemoral arthritis    Pneumothorax    Polycythemia vera (Multi)    Sleep apnea    Symptomatic varicose veins of left lower extremity    Venous insufficiency    Class 2 obesity with body mass index (BMI) of 38.0 to 38.9 in adult    Long term (current) use of anticoagulants    Hx-TIA (transient ischemic attack)    Chronic pain of left knee    Trigger finger, right ring finger    Former smoker    BMI 38.0-38.9,adult    COVID    Generalized weakness    Acute on chronic  respiratory failure with hypoxia (Multi)    Hx of malignant mesothelioma    Impaired mobility and ADLs    Acute diverticulitis    Sepsis (Multi)    Sepsis, due to unspecified organism, unspecified whether acute organ dysfunction present (Multi)    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Multi)    Anemia    Cellulitis of scrotum    Recurrent pleural effusion    Abdominal wall cellulitis     Clinical impressions:  1. Sinus node dysfunction with twelve-lead EKG today revealing marked sinus bradycardia with a first-degree AV block on low-dose beta-blockade.  2. Paroxysmal atrial fibrillation with rapid ventricular response controlled on metoprolol and anticoagulated with warfarin.  3. Traumatic left pneumothorax per hospitalization dated April 29, 2022 status post VATS procedure, partial decortication, mechanical pleurodesis, and biopsy for secondary spontaneous pneumothorax.  4. Coronary artery disease status post remote PTCA.  5. Dyslipidemia on statin.  6. Hypertension, controlled   7. Morbid obesity with a BMI of 37.1.  8. Polycythemia vera followed by Dr. Gamboa.  9. Benign prostatic hypertrophy.  10. 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 Thu Ann. Status post 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 treatments.  11. Temporary spinal stimulator pending placement of a permanent stimulator at [facility] on May 8, 2023.  12.  Skin rash/cellulitis of the abdominal area    PLAN     Long discussion with patient and family member regarding plan to follow for management of atrial fibrillation.  Device interrogation shows that the patient has been in atrial fibrillation since May 26, 2024.  Currently he is in atrial fibrillation with episodes of rapid ventricular response.  The dose of Cardizem IV has increased overnight.  We will add Lopressor 25 mg 1 tablet twice a day  If we need to control rates better and patient develops hypotension, we can add amiodarone.    Also long conversation regarding anticoagulant therapy.   Due to history of GI bleed that required transfusions of PRBC during admission at [facility] and now hemoglobin trending down and possibility of GI bleed may have to stop Lovenox.    Chavo Cid MD      Please do not hesitate to call with questions.  Electronically signed by Chavo Cid MD, [facility] on 6/16/2024 at 11:22 AM

## 2024-06-16 NOTE — PROGRESS NOTES
Medical Group Progress Note  ASSESSMENT & PLAN:     Scrotal/abdominal wall cellulitis  ?scrotal abscess  -seems to be improving  -fluid collection not clear if hydrocele or abscess; no plans for surgical intervention for right now  -cont with iv antibiotics, seems to be improving  -follow cultures    Afib with RVR  -cont with current treatment, stable on dilt drip  -he is on lovenox currently; [place] [facility] [place] 2/2 anemia, GI bleeding  -final anticoagulation plan pending goals of care, hemoglobin trend while here    Anemia  -stable, likely multifactorial per last heme note including reduced bone marrow reserve, ongoing chronic and acute illnesses  -transfuse <7    Progressive mesothelioma  -with significant L lung collapse, pleural effusions, hilar mass, and possible tumor thrombus  -[place] [facility] [place]; currently no plans for further chemotherapy, limited by his functional reserve and multiple chronic conditions  -had discussion with wife and son at bedside today; pt reports his goals of care are to get home; will see how he progresses while here to determine if we try for SNF and more functional recovery or possibly hospice at home  -add codeine syrup for cough; unfortunately pharmacy does not stock promethazine syrup which has helped in the past for him    VTE Prophylaxis: Lovenox    6/16/24:  Sustained RVR this morning, dilt drip increased to 10mg/hr with some improvement  Will d/w EP best strategy to keep HR under control orally  Will give lasix 40mg IV today for LE swelling and pain; dyspnea is stable  Abd wall about the same today, scrotal erythema looks improved; cont with current antibiotics    Deondre Wagner MD    SUBJECTIVE     HR sustaining >130 this am, he is not symptomatic. C/o bilateral lower extremity pain and swelling. Abd pain resolved. No scrotal pain. No fever.    OBJECTIVE:     Last Recorded Vitals:  Vitals:    06/15/24  1947 06/16/24 0055 06/16/24 0339 06/16/24 0756   BP: 129/78 110/71 93/58 124/60   BP Location: Left arm   Left arm   Patient Position: Lying   Lying   Pulse: 101 (!) 126  (!) 118   Resp: 16 18 18   Temp: 36.7 °C (98.1 °F) 36.2 °C (97.2 °F) 36.4 °C (97.5 °F) 36.9 °C (98.4 °F)   TempSrc: Temporal Temporal  Tympanic   SpO2: 98% 97%  96%   Weight:       Height:         Last I/O:  I/O last 3 completed shifts:  In: 2050.4 (17.4 mL/kg) [P.O.:600; I.V.:310.4 (2.6 mL/kg); IV Piggyback:1140]  Out: 1700 (14.4 mL/kg) [Urine:1700 (0.4 mL/kg/hr)]  Weight: 117.9 kg     Physical Exam  GEN: chronically ill appearing, obese  HEENT: NCAT, PERRLA, EOMI, moist mucous membranes  NECK: supple, no masses  CV: irregularly irregular, no m/r/g, no LE edema  LUNGS: diminished bilaterally, rhonchi,   ABD: soft, NT, ND, NBS  SKIN: L abdominal wall erythema, improving, mild edema  : L scrotal wall cellulitis, mild TTP  MSK; no gross deformities, normal joints; pitting edema bilaterally  NEURO: A+Ox3, no FND  PSYCH: appropriate mood, affect      Inpatient Medications:  cefepime, 1 g, intravenous, q12h  enoxaparin, 1 mg/kg, subcutaneous, q24h  furosemide, 40 mg, intravenous, Once  metroNIDAZOLE, 500 mg, intravenous, q8h  pantoprazole, 40 mg, oral, Daily before breakfast   Or  pantoprazole, 40 mg, intravenous, Daily before breakfast  rosuvastatin, 10 mg, oral, Daily  tamsulosin, 0.4 mg, oral, Daily  vancomycin, 500 mg, intravenous, q24h    PRN Medications  PRN medications: acetaminophen **OR** acetaminophen **OR** acetaminophen, albuterol, benzonatate, codeine-guaifenesin, dextromethorphan, melatonin, ondansetron **OR** ondansetron, polyethylene glycol, vancomycin  Continuous Medications:  dilTIAZem, 10 mg/hr, Last Rate: 10 mg/hr (06/16/24 0848)      LABS AND IMAGING:     Labs:  Results from last 7 days   Lab Units 06/16/24  0646 06/15/24  0537 06/14/24  1523   WBC AUTO x10*3/uL 4.8 4.8 4.3*   RBC AUTO x10*6/uL 2.57* 2.79* 2.93*   HEMOGLOBIN g/dL  7.5* 8.2* 8.6*   HEMATOCRIT % 24.3* 27.2* 28.0*   MCV fL 95 98 96   MCH pg 29.2 29.4 29.4   MCHC g/dL 30.9* 30.1* 30.7*   RDW % 16.0* 16.1* 16.2*   PLATELETS AUTO x10*3/uL 143* 142* 159     Results from last 7 days   Lab Units 06/16/24  0646 06/15/24  0537 06/14/24  1523 06/11/24  0651   SODIUM mmol/L 140 139 140 136   POTASSIUM mmol/L 4.6 5.0 5.1 5.0   CHLORIDE mmol/L 109* 107 107 105   CO2 mmol/L 23 23 26 20*   BUN mg/dL 46* 48* 51* 59*   CREATININE mg/dL 3.22* 3.35* 3.50* 3.58*   GLUCOSE mg/dL 104* 86 104* 86   PROTEIN TOTAL g/dL  --  5.8* 6.2* 5.7*   CALCIUM mg/dL 7.8* 8.3* 8.5* 8.2*   BILIRUBIN TOTAL mg/dL  --  0.4 0.3 0.4   ALK PHOS U/L  --  59 60 57   AST U/L  --  28 31 34   ALT U/L  --  13 14 22     Results from last 7 days   Lab Units 06/16/24  0646 06/11/24  0651 06/10/24  2157   MAGNESIUM mg/dL 1.56* 2.02 2.18         Imaging:  ECG 12 lead  Accelerated Junctional rhythm  Low voltage QRS  Nonspecific ST abnormality  Abnormal ECG  When compared with ECG of 30-MAY-2024 08:33,  Junctional rhythm has replaced Atrial flutter    See ED provider note for full interpretation and clinical correlation    Confirmed by Chavo Cid ([id number]) on 6/15/2024 11:11:58 AM

## 2024-06-16 NOTE — PROGRESS NOTES
"Vinicius Arrioal is a 89 y.o. male on day 1 of admission presenting with Cellulitis of scrotum.    Subjective   Patient states he has some cough and shortness of breath on exertion.     Objective   Patient hemodynamically stable vital stable legs and abdominal swelling, edema getting slightly better  ROS  Review of Systems   Constitutional:  Positive for appetite change, fatigue and unexpected weight change.   HENT:  Positive for congestion.    Eyes: Negative.    Respiratory:  Positive for cough, shortness of breath and wheezing.    Cardiovascular:  Positive for palpitations and leg swelling.   Gastrointestinal:  Positive for abdominal distention and abdominal pain.   Endocrine: Negative.    Genitourinary: Negative.    Musculoskeletal:  Positive for arthralgias and myalgias.   Skin:  Positive for rash.   Allergic/Immunologic: Negative.    Neurological:  Positive for weakness.   Hematological: Negative.    Psychiatric/Behavioral:  Positive for sleep disturbance. The patient is nervous/anxious.    All other systems reviewed and are negative.     Vital Signs  Blood pressure 129/78, pulse 101, temperature 36.7 °C (98.1 °F), resp. rate 16, height 1.727 m (5' 8\"), weight 118 kg (260 lb), SpO2 98%.    Physical Exam   Vitals reviewed.   Constitutional:       Appearance: He is obese. He is ill-appearing.   HENT:      Head: Normocephalic.      Nose: Congestion present.      Mouth/Throat:      Mouth: Mucous membranes are dry.   Eyes:      Conjunctiva/sclera: Conjunctivae normal.   Cardiovascular:      Rate and Rhythm: Tachycardia present.      Pulses: Normal pulses.      Heart sounds: Normal heart sounds.   Pulmonary:      Breath sounds: Wheezing and rales present.   Abdominal:      General: Bowel sounds are normal. There is distension.      Tenderness: There is abdominal tenderness. There is rebound.   Musculoskeletal:         General: Swelling and tenderness present. Normal range of motion.      Cervical back: Normal range of " motion.      Right lower leg: Edema present.      Left lower leg: Edema present.   Skin:     Capillary Refill: Capillary refill takes 2 to 3 seconds.      Findings: Erythema and rash present.   Neurological:      General: No focal deficit present.      Mental Status: He is alert and oriented to person, place, and time.   Psychiatric:         Mood and Affect: Mood normal.         Behavior: Behavior normal.         Thought Content: Thought content normal.         Judgment: Judgment normal.         Results for orders placed or performed during the hospital encounter of 06/14/24 (from the past 96 hour(s))   ECG 12 lead   Result Value Ref Range    Ventricular Rate 126 BPM    Atrial Rate 129 BPM    QRS Duration 78 ms    QT Interval 324 ms    QTC Calculation(Bazett) 469 ms    R Axis 81 degrees    T Axis 42 degrees    QRS Count 20 beats    Q Onset 229 ms    T Offset 391 ms    QTC Fredericia 415 ms   CBC and Auto Differential   Result Value Ref Range    WBC 4.3 (L) 4.4 - 11.3 x10*3/uL    nRBC 0.0 0.0 - 0.0 /100 WBCs    RBC 2.93 (L) 4.50 - 5.90 x10*6/uL    Hemoglobin 8.6 (L) 13.5 - 17.5 g/dL    Hematocrit 28.0 (L) 41.0 - 52.0 %    MCV 96 80 - 100 fL    MCH 29.4 26.0 - 34.0 pg    MCHC 30.7 (L) 32.0 - 36.0 g/dL    RDW 16.2 (H) 11.5 - 14.5 %    Platelets 159 150 - 450 x10*3/uL    Neutrophils % 73.8 40.0 - 80.0 %    Immature Granulocytes %, Automated 1.9 (H) 0.0 - 0.9 %    Lymphocytes % 7.0 13.0 - 44.0 %    Monocytes % 11.5 2.0 - 10.0 %    Eosinophils % 5.6 0.0 - 6.0 %    Basophils % 0.2 0.0 - 2.0 %    Neutrophils Absolute 3.14 1.60 - 5.50 x10*3/uL    Immature Granulocytes Absolute, Automated 0.08 0.00 - 0.50 x10*3/uL    Lymphocytes Absolute 0.30 (L) 0.80 - 3.00 x10*3/uL    Monocytes Absolute 0.49 0.05 - 0.80 x10*3/uL    Eosinophils Absolute 0.24 0.00 - 0.40 x10*3/uL    Basophils Absolute 0.01 0.00 - 0.10 x10*3/uL   Comprehensive metabolic panel   Result Value Ref Range    Glucose 104 (H) 74 - 99 mg/dL    Sodium 140 136 - 145  mmol/L    Potassium 5.1 3.5 - 5.3 mmol/L    Chloride 107 98 - 107 mmol/L    Bicarbonate 26 21 - 32 mmol/L    Anion Gap 12 10 - 20 mmol/L    Urea Nitrogen 51 (H) 6 - 23 mg/dL    Creatinine 3.50 (H) 0.50 - 1.30 mg/dL    eGFR 16 (L) >60 mL/min/1.73m*2    Calcium 8.5 (L) 8.6 - 10.3 mg/dL    Albumin 3.0 (L) 3.4 - 5.0 g/dL    Alkaline Phosphatase 60 33 - 136 U/L    Total Protein 6.2 (L) 6.4 - 8.2 g/dL    AST 31 9 - 39 U/L    Bilirubin, Total 0.3 0.0 - 1.2 mg/dL    ALT 14 10 - 52 U/L   Lipase   Result Value Ref Range    Lipase 26 9 - 82 U/L   Lactate   Result Value Ref Range    Lactate 1.3 0.4 - 2.0 mmol/L   C-Reactive Protein   Result Value Ref Range    C-Reactive Protein 10.32 (H) <1.00 mg/dL   Sedimentation Rate   Result Value Ref Range    Sedimentation Rate 38 (H) 0 - 20 mm/h   Blood Culture    Specimen: Peripheral Venipuncture; Blood culture   Result Value Ref Range    Blood Culture Loaded on Instrument - Culture in progress    Light Blue Top   Result Value Ref Range    Extra Tube Hold for add-ons.    Lavender Top   Result Value Ref Range    Extra Tube Hold for add-ons.    Blood Culture    Specimen: Peripheral Venipuncture; Blood culture   Result Value Ref Range    Blood Culture Loaded on Instrument - Culture in progress    SST TOP   Result Value Ref Range    Extra Tube Hold for add-ons.    B-Type Natriuretic Peptide   Result Value Ref Range     (H) 0 - 99 pg/mL   Vancomycin   Result Value Ref Range    Vancomycin 17.9 5.0 - 20.0 ug/mL   CBC   Result Value Ref Range    WBC 4.8 4.4 - 11.3 x10*3/uL    nRBC 0.0 0.0 - 0.0 /100 WBCs    RBC 2.79 (L) 4.50 - 5.90 x10*6/uL    Hemoglobin 8.2 (L) 13.5 - 17.5 g/dL    Hematocrit 27.2 (L) 41.0 - 52.0 %    MCV 98 80 - 100 fL    MCH 29.4 26.0 - 34.0 pg    MCHC 30.1 (L) 32.0 - 36.0 g/dL    RDW 16.1 (H) 11.5 - 14.5 %    Platelets 142 (L) 150 - 450 x10*3/uL   Comprehensive metabolic panel   Result Value Ref Range    Glucose 86 74 - 99 mg/dL    Sodium 139 136 - 145 mmol/L     Potassium 5.0 3.5 - 5.3 mmol/L    Chloride 107 98 - 107 mmol/L    Bicarbonate 23 21 - 32 mmol/L    Anion Gap 14 10 - 20 mmol/L    Urea Nitrogen 48 (H) 6 - 23 mg/dL    Creatinine 3.35 (H) 0.50 - 1.30 mg/dL    eGFR 17 (L) >60 mL/min/1.73m*2    Calcium 8.3 (L) 8.6 - 10.3 mg/dL    Albumin 2.9 (L) 3.4 - 5.0 g/dL    Alkaline Phosphatase 59 33 - 136 U/L    Total Protein 5.8 (L) 6.4 - 8.2 g/dL    AST 28 9 - 39 U/L    Bilirubin, Total 0.4 0.0 - 1.2 mg/dL    ALT 13 10 - 52 U/L   Vancomycin   Result Value Ref Range    Vancomycin 15.3 5.0 - 20.0 ug/mL      Oxygen Therapy  SpO2: 98 %  Medical Gas Therapy: Supplemental oxygen  O2 Delivery Method: Nasal cannula (3L)       Intake/Output  I/O last 3 completed shifts:  In: 1810.4 (15.4 mL/kg) [P.O.:360; I.V.:310.4 (2.6 mL/kg); IV Piggyback:1140]  Out: 1200 (10.2 mL/kg) [Urine:1200 (0.3 mL/kg/hr)]  Weight: 117.9 kg   ECG 12 lead    Result Date: 6/15/2024  Accelerated Junctional rhythm Low voltage QRS Nonspecific ST abnormality Abnormal ECG When compared with ECG of 30-MAY-2024 08:33, Junctional rhythm has replaced Atrial flutter See ED provider note for full interpretation and clinical correlation Confirmed by Chavo Cid ([id number]) on 6/15/2024 11:11:58 AM    US scrotum w doppler    Result Date: 6/14/2024  Interpreted By:  Julieta Callahan, STUDY: US SCROTUM WITH DOPPLER; 6/14/2024 4:37 pm   INDICATION: Swelling, pain, and erythema.   COMPARISON: None.   ACCESSION NUMBER(S): [id number]   ORDERING CLINICIAN: QUE GUZMAN   TECHNIQUE: Gray scale and color doppler imaging of the scrotum was performed with spectral waveform analysis. Arterial inflow and venous outflow documented. Duplex Doppler interrogation including color flow and spectral waveform analysis is performed.   FINDINGS: Right testis: 3.4 x 2.0 x 3.2 cm.   Left testis: 4.9 x 2.9 x 3.0 cm.   Epididymides: The epididymides are normal in size and echogenicity. There is a 2 x 3 x 3 mm cyst within right epididymal head.   The testes  appear homogeneous with no intratesticular lesions. Ectasia of the rete testes on the right is present. Duplex Doppler interrogation of each testicle including color flow and spectral waveform analysis shows normal arterial and venous flow without torsion or orchitis.   There is a 3.6 x 6.0 x 3.0 encapsulated fluid collection anterior and superior to the right testicle containing low-level internal echoes and debris. This does not arise from the right epididymis. This may represent a scrotal wall abscess. There appears to be diffuse thickening of the scrotal wall.       3.0 x 3.6 x 6.0 cm encapsulated fluid collection noted which is extratesticular in location and is seen anterior and superior to the right testicle. This contains low-level internal echoes and some thickening of its wall with internal debris. This may represent a scrotal abscess. Clinical correlation is necessary.   Ectasia of the rete testes on the right.   3 mm cyst right epididymal head.   No epididymal orchitis.   MACRO: None.   Signed by: Julieta Callahan 6/14/2024 4:59 PM Dictation workstation:   [id number]    XR chest 2 views    Result Date: 6/14/2024  Interpreted By:  Josh Soto, STUDY: XR CHEST 2 VIEWS;  6/14/2024 3:10 pm   INDICATION: Signs/Symptoms:Cough.   COMPARISON: 05/28/2024   ACCESSION NUMBER(S): [id number]   ORDERING CLINICIAN: WES ORTIZ   FINDINGS: Pacemaker leads are intact and properly positioned.       CARDIOMEDIASTINAL SILHOUETTE: Cardiomediastinal silhouette is normal in size and configuration.   LUNGS: There is complete opacification left hemithorax with volume loss and shift of the mediastinum to the left.   Since the prior examination further increase in density of the right lung base which may suggest atelectasis or infiltrate. Correlation with auscultation and inflammatory markers recommended. A small to moderate on right pleural effusion persists.   ABDOMEN: No remarkable upper abdominal findings.   BONES: No  acute osseous changes.       1.  Complete opacification with volume loss left hemithorax. Increased density right lung base reflecting small to moderate right pleural effusion. Superimposed pneumonia can not be excluded.       MACRO: None   Signed by: Josh Soto 6/14/2024 3:36 PM Dictation workstation:   [id number]    CT abdomen pelvis wo IV contrast    Result Date: 6/14/2024  Interpreted By:  Schoenberger, Joseph, STUDY: CT ABDOMEN PELVIS WO IV CONTRAST;  6/14/2024 2:55 pm   INDICATION: Signs/Symptoms:Scrotal swelling, erythema, erythema extending up into the abdomen.   COMPARISON: 05/22/2024   ACCESSION NUMBER(S): [id number]   ORDERING CLINICIAN: WES ORTIZ   TECHNIQUE: CT of the abdomen and pelvis was performed. Contiguous axial images were obtained at 3 mm slice thickness through the abdomen and pelvis. Coronal and sagittal reconstructions at 3 mm slice thickness were performed.  No intravenous or oral contrast agents were administered.   FINDINGS: Please note that the evaluation of vessels, lymph nodes and organs is limited without intravenous contrast.   LOWER CHEST: There is some persistent volume loss in the left lower lobe with persistent consolidation. There are few air bronchograms. There is rounded collection of fluid at the anterior aspect of this process which may relate to either necrotizing pneumonia or loculated pleural fluid or conceivably pulmonary abscess. However this is also unchanged from the prior. There is a however, a new moderate dependent layering right pleural effusion.   ABDOMEN:   LIVER: 2 cysts located in the superior aspect the left liver lobe are stable from the prior. No other focal abnormality.   BILE DUCTS: No dilation   GALLBLADDER: Dependent layering calcified gallstones. No wall thickening or pericholecystic fluid.   PANCREAS: Unremarkable   SPLEEN: Unremarkable   ADRENAL GLANDS: Bilateral adrenal glands appear normal.   KIDNEYS AND URETERS: The kidneys are normal  in size and unremarkable in appearance.  No hydroureteronephrosis or nephroureterolithiasis is identified. 5 mm nonobstructing lower pole right renal stone unchanged.   PELVIS:   BLADDER: High choose 1   REPRODUCTIVE ORGANS: Unremarkable   BOWEL: The stomach is unremarkable.  Small bowel loops are normal in caliber without mural thickening. Colon is normal in caliber. There is a mobile cecum. There are multiple colonic diverticula. There is no convincing evidence for acute diverticulitis. Normal appendix.   VESSELS: There is no aneurysmal dilatation of the abdominal aorta. The IVC appears normal.   PERITONEUM/RETROPERITONEUM/LYMPH NODES: There is no free or loculated fluid collection, no free intraperitoneal air. The retroperitoneum appears normal.  No abdominopelvic lymphadenopathy is present. The   ABDOMINAL WALL: There is soft tissues Amanda in the abdominal wall which is new from the prior. This is a set metric Roseann more pronounced on the left than on the right. This extends into the lower abdominal left-sided pannus. It does not appear to overtly involve the scrotum. There may be loculated right-sided hydrocele.   BONES: No suspicious osseous lesions are identified. 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       1.  The high significant development of abdominal wall edema in the subcutaneous tissues asymmetrically worse on the left. This does not appear to especially extend into the scrotum. 2. There is a new moderate right pleural effusion. 3. Persistent left basal consolidation with anterior oval shaped collection of fluid with similar appearance to prior other than lack of air bronchograms on this exam compared to the prior. Associated volume loss.     MACRO: None   Signed by: Joseph Schoenberger 6/14/2024 3:30 PM Dictation workstation:   [id number]    US renal complete    Result Date: 6/8/2024  Interpreted By:  Ryan Best and Weaver Jakob STUDY: US  RENAL COMPLETE;  6/6/2024 5:51 pm   INDICATION: Signs/Symptoms:VIK.   COMPARISON: None.   ACCESSION NUMBER(S): [id number]   ORDERING CLINICIAN: ADRYAN SIMON   TECHNIQUE: Multiple images of the kidneys were obtained.   FINDINGS: RIGHT KIDNEY: The right kidney measures 11.8 cm in length. The renal cortical echogenicity is increased. Renal cortical thickness is mildly decreased. No hydronephrosis is present; no evidence of nephrolithiasis.   LEFT KIDNEY: The left kidney is poorly visualized, measuring approximately 11.8 cm. Renal cortical echogenicity is increased. No apparent hydronephrosis or nephrolithiasis.   BLADDER: The urinary bladder is unremarkable in appearance. Poorly visualized small volume free fluid in the left upper quadrant.       Examination is somewhat limited due to patient body habitus, positioning. Within this limitation: Increased renal cortical echogenicity bilaterally which may relate to medical renal disease. No hydronephrosis or nephrolithiasis.   I personally reviewed the images/study and I agree with the findings as stated. This study was interpreted at [place], [place].   MACRO: None   Signed by: Ryan Urena 6/8/2024 3:02 AM Dictation workstation:   [id number]    ECG 12 Lead    Result Date: 5/31/2024  Atrial flutter with variable AV block with premature ventricular or aberrantly conducted complexes Low voltage QRS Abnormal ECG When compared with ECG of 28-MAY-2024 01:40, Atrial flutter has replaced Atrial fibrillation Nonspecific T wave abnormality, improved in Inferior leads Confirmed by Jovanny Loera ([id number]) on 5/31/2024 1:51:33 PM    Flexible Sigmoidoscopy    Result Date: 5/31/2024  Table formatting from the original result was not included. Impression Dark red blood visualized in the rectum. Blood was cleared without any active bleeding appreciated nor underlying mucosal changes. Few small and large, scattered diverticula  with no inflammation in the sigmoid colon; no bleeding was identified. Diverticula were irrigated without any bleeding or stigmata of recent bleed appreciated. All observed locations appeared normal, including the sigmoid colon, rectosigmoid and rectum. No mucosal abnormalities. Normal on retroflexion. Green bilious stool proximal to the rectum, most notably in the proximal descending colon and transverse colon, without any blood, blood clots or hematin. Findings Dark red blood visualized in the rectum. Blood was cleared without any active bleeding appreciated nor underlying mucosal changes. Few small and large, scattered diverticula with no inflammation in the sigmoid colon; no bleeding was identified. Diverticula were irrigated without any bleeding or stigmata of recent bleed appreciated. All observed locations appeared normal, including the sigmoid colon, rectosigmoid and rectum. No mucosal abnormalities. Normal on retroflexion. Green bilious stool proximal to the rectum, most notably in the proximal descending colon and transverse colon, without any blood, blood clots or hematin. Recommendation  Follow up with primary gastroenterologist  Follow up with inpatient GI consult service, Dr. Valenzuela and Dr. 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 The patient's tolerance of previous  anesthesia has been reviewed. The risks and benefits of the procedure and the sedation options and risks were discussed with the patient. All questions were answered and informed consent obtained. Details of the Procedure The patient underwent no sedation. The patient's blood pressure, ECG, heart rate, level of consciousness, oxygen and respirations were monitored throughout the procedure. A digital rectal exam was performed. A perianal exam was performed. The scope was introduced through the anus and advanced to the transverse colon. Retroflexion was performed in the rectum. The quality of bowel preparation was evaluated using the [place]wn Bowel Preparation Scale with scores of: left colon = 3. Bowel prep was not adequate. The patient experienced no blood loss. The procedure was not difficult. The patient tolerated the procedure well. There were no apparent adverse events. Events Procedure Events Event Event Time ENDO SCOPE IN TIME 5/24/2024  1:08 PM ENDO SCOPE OUT TIME 5/24/2024  1:26 PM Specimens No specimens collected Procedure Location [facility] [address] [facility] [id number] [phone] Referring Provider Grayson Nichols, APRN- [address] [address] Procedure Provider Ale Archibald MD     Lower extremity venous duplex left    Result Date: 5/31/2024  Interpreted By:  Nathan Argueta and Weaver Jakob STUDY: [facility] US LOWER EXTREMITY VENOUS DUPLEX LEFT;  5/30/2024 3:58 pm   INDICATION: Signs/Symptoms:LLE swelling, eval for DVT.   COMPARISON: None.   ACCESSION NUMBER(S): [id number]   ORDERING CLINICIAN: ALEXANDRA AVILA   TECHNIQUE: Vascular ultrasound of the left lower extremity was performed. Real-time compression views as well as Gray scale, color Doppler and spectral Doppler waveform analysis was performed.   FINDINGS: Evaluation of the visualized portions of the left common femoral vein, proximal, mid, and distal  femoral vein, and popliteal vein were performed.  Evaluation of the visualized portions of the  posterior tibial and peroneal veins were also performed. Evaluation of the calf veins limited due to edema.   Echogenic intraluminal material in the proximal greater saphenous vein with partial compressibility and color Doppler signal The evaluated veins demonstrate normal compressibility. There is intact venous flow demonstrating normal respiratory variability and normal augmentation of flow with calf compression. Therefore, there is no ultrasonographic evidence for deep vein thrombosis within the evaluated veins.       1.  No sonographic evidence for deep vein thrombosis within the evaluated veins of the left lower extremity. 2. Nonocclusive thrombus of the proximal greater saphenous vein, a superficial vein. Recommend correlation with concern for superficial thrombophlebitis.   I personally reviewed the images/study and I agree with the findings as stated by Agustín Cheatham MD. This study was interpreted at [place], [place].   MACRO: None   Signed by: Nathan Argueta 5/31/2024 5:32 AM Dictation workstation:   [id number]    ECG 12 Lead    Result Date: 5/30/2024  Atrial flutter with variable AV block Rightward axis Low voltage QRS Nonspecific ST and T wave abnormality Abnormal ECG When compared with ECG of 16-MAY-2024 21:13, Significant changes have occurred Confirmed by Jovanny Loera ([id number]) on 5/30/2024 11:03:21 AM    US chest    Result Date: 5/29/2024  Interpreted By:  Dinh Plunkett, STUDY: US CHEST; 5/29/20243:53 pm   INDICATION: Signs/Symptoms:Thoracentesis (Left) diagnostic/therapeutic.   COMPARISON: None.   ACCESSION NUMBER(S): [id number]   ORDERING CLINICIAN: ALEXANDRA AVILA   TECHNIQUE: INTERVENTIONALIST(S): Dinh Plunkett   CONSENT: The patient/patient's POA/next of kin was informed of the nature of the proposed procedure. The purposes, alternatives, risks, and  benefits were explained and discussed. All questions were answered and consent was obtained.   SEDATION: None   TIME OUT: A time out was performed immediately prior to procedure start with the interventional team, correctly identifying the patient name, date of birth, MRN, procedure, anatomy (including marking of site and side), patient position, procedure consent form, relevant laboratory and imaging test results, antibiotic administration, safety precautions, and procedure-specific equipment needs.   FINDINGS: The patient was placed in the supine position.   The thoracic space was examined with grey scale ultrasound, and an absence of free fluid was demonstrated on ultrasound. Thoracic images were captured to demonstrate. A thoracentesis was not performed.       Absence of free fluid for procedure. A thoracentesis was not performed.   I personally performed and/or directly supervised this study and was present for the entire procedure.   Performed and dictated at [facility].   Signed by: Dinh Plunkett 5/29/2024 3:53 PM Dictation workstation:   [id number]    XR chest 1 view    Result Date: 5/28/2024  Interpreted By:  Jovany Nuñez, STUDY: XR CHEST 1 VIEW;  5/28/2024 8:03 am   INDICATION: Signs/Symptoms:Rule out infection.   COMPARISON: Chest radiograph dated 5/27/2024   ACCESSION NUMBER(S): [id number]   ORDERING CLINICIAN: ALEXANDRA AVILA   FINDINGS: AP radiograph of the chest   Pacemaker electrodes appear in adequate position. There is virtually complete opacification of the left thorax. The heart mediastinum are shifted to the left indicating volume loss. Compensatory hyperaeration of the right lung and pulmonary vascular shunting to the right lung is noted increased from previous study.         1. Virtually complete opacification of the left thorax with heart and mediastinum shifted to the left indicating volume loss 2. Compensatory pulmonary vascular shunting to the right  lung       Signed by: Jovany Nuñez 5/28/2024 10:38 AM Dictation workstation:   [id number]    Electrocardiogram, 12-lead PRN ACS symptoms    Result Date: 5/28/2024  Atrial fibrillation with rapid ventricular response Nonspecific ST and T wave abnormality , probably digitalis effect Abnormal ECG When compared with ECG of 28-MAY-2024 01:39, (unconfirmed) No significant change was found Confirmed by Jovanny Loera ([id number]) on 5/28/2024 10:17:48 AM    FL modified barium swallow study    Result Date: 5/26/2024  Interpreted By:  Sohail Torres,  and Xavi Jerez STUDY: [place] MODIFIED BARIUM SWALLOW STUDY;; 5/25/2024 9:06 am   INDICATION: Signs/Symptoms:r\o dysphagia.   COMPARISON: None.   ACCESSION NUMBER(S): [id number]   ORDERING CLINICIAN: ALEXANDRA AVILA   TECHNIQUE: MBSS completed. Informed verbal consent obtained prior to completion of exam. Trials of thin, nectar thick,  puree, and regular solids given. Fluoroscopy time :  2 minutes.   SLP: Meri Hurley MS CCC/SLP Phone/Pager: Epic Chat   SPEECH FINDINGS: Reason for referral: Further assessment of oropharyngeal swallow Patient hx: Recent RLL PNA. S/s of aspiration w/3 oz Swallow Protocol Respiratory status: Nasal cannula Previous diet: Reg/thin   FINAL SPEECH RECOMMENDATIONS   Diet recommendations/feeding strategies: Solid Diet Recommendations : Easy to Chew Liquid Diet Recommendations: Thin Medication Administration: Single w/ sips of water, whole in puree as needed   Safe Swallow Strategies/Guidelines: Only present PO intake when awake and alert Supervision/assist w/ PO intake to assure adherence to safe swallow strategies Upright positioning Slow rate/small boluses   Follow-up speech therapy recommended: Yes.   Short term goals: Pt will tolerate least restrictive diet with adequate oral phase and no s/s of aspiration. Date initiated: 5/25/24 Status: Goal initiated   Pt will adhere to safe swallow strategies during PO intake with > 90% accuracy independently. Date  initiated: 5/25/24 Status: Goal initiated   Education provided: Yes.     Mechanics of the swallow summary: *Oral phase: Mild deficits.   Adequate bolus acceptance.  Prolonged mastication and bolus formation/transit w/ regular solids.  No oral residue.   *Pharyngeal phase: Mild deficits.   Incomplete epiglottic inversion, may be related to presence of NGT. Timely swallow initiation.  Adequate hyolaryngeal elevation/excursion.  Trace penetration x1 with consecutive boluses of thin liquids related to incomplete airway protection.  Ejected upon swallow completion.  No other penetration and no aspiration with any other consistency assessed.  Mild residue at the valleculae following the swallow.  Largely cleared with liquid wash.  Mildly reduced distention of PES.     SLP impressions with severity rating: Mild oropharyngeal dysphagia characterized by prolonged mastication/bolus formation and mild residue at the valleculae following the swallow.   Thin Liquids (MBSS) Rosenbek's Penetration Aspiration Scale, Thin Liquids (MBSS): 2. PENETRATION that CLEARS - contrast enter airway, above vocal cords, no residue     Nectar Thick Liquids (MBSS) Rosenbek's Penetration Aspiration Scale, Nectar thick liquids (MBSS): 1. NO ASPIRATION & NO PENETRATION - no aspiration, contrast does not enter airway       Purees (MBSS) Rosenbek's Penetration Aspiration Scale, Purees (MBSS): 1. NO ASPIRATION & NO PENETRATION - no aspiration, contrast does not enter airway     Solids (MBSS) Rosenbek's Penetration Aspiration Scale, Solids (MBSS): 1. NO ASPIRATION & NO PENETRATION - no aspiration, contrast does not enter airway     Speech Therapy section of this report signed by Meri Hurley MS CCC/SLP on 5/25/2024 at 10:17 am.   RADIOLOGY FINDINGS: Nasoenteric tube is partially visualized. No evidence of acute osseous abnormality of the cervical spine. Patient is edentulous.   Radiology section of this report signed by Dr. Torres.       1. No  radiographic evidence of acute process in the neck. 2. Please refer to speech pathology report for additional findings.   MACRO: None   Signed by: Sohail Torres 5/26/2024 3:51 PM Dictation workstation:   [id number]    XR abdomen 1 view    Result Date: 5/25/2024  Interpreted By:  Marcie Shah, STUDY: XR ABDOMEN 1 VIEW; 5/25/2024 5:45 pm   INDICATION: Signs/Symptoms:ngt placed.   COMPARISON: Radiograph dated 05/25/2024, 2:33 p.m.   ACCESSION NUMBER(S): [id number]   ORDERING CLINICIAN: ALEXANDRA AVILA   FINDINGS: Single-view of the abdomen. The pelvis is not included. Enteric tube is in place with the tip projecting over the stomach. Positive contrast is identified in the gastric fundus. Prominent loops of bowel throughout the visualized upper abdomen. Nonobstructive bowel gas pattern.  Limited evaluation of pneumoperitoneum on supine imaging, however no gross evidence of free air is noted.   Extensive consolidation in visualized portion of the left lung.   Osseous structures demonstrate no acute bony changes.       1.  Enteric tube projecting over the proximal stomach. 2. Again seen multiple prominent loops of bowel throughout the upper abdomen. Correlation with ileus. Recommend follow-up radiograph.   Signed by: Marcie Rodriguez 5/25/2024 5:54 PM Dictation workstation:   [id number]    XR abdomen 1 view    Result Date: 5/25/2024  Interpreted By:  Marcie Shah, STUDY: XR ABDOMEN 1 VIEW; 5/25/2024 3:06 pm   INDICATION: Signs/Symptoms:distended abdomen, assess for dilated bowel loops.   COMPARISON: Radiograph dated 05/21/2024   ACCESSION NUMBER(S): [id number]   ORDERING CLINICIAN: ALEXANDRA AVILA   FINDINGS: Views of the abdomen and pelvis. What is presumed to be enteric tube is projecting over the lower mediastinum. Positive contrast is identified in the expected location of the stomach. There is also positive contrast throughout the loops of bowel in the lower abdomen. Prominent loops of  colon. Limited evaluation of pneumoperitoneum on supine imaging, however no gross evidence of free air is noted.   Consolidative opacities in left lung.   Osseous structures demonstrate no acute bony changes.       1.  Prominent loops of colon which can be due to ileus. Recommend follow-up radiograph.   Signed by: Marcie Rodriguez 5/25/2024 5:33 PM Dictation workstation:   [id number]    XR chest 1 view    Result Date: 5/23/2024  Interpreted By:  Jovany Nuñez and Baker Zachary STUDY: XR CHEST 1 VIEW; ;  5/22/2024 6:45 pm   INDICATION: Signs/Symptoms:Worsening tachypnea, confusion.   COMPARISON: Chest radiograph on 05/21/2024.   ACCESSION NUMBER(S): [id number]   ORDERING CLINICIAN: SUN CHAMPAGNE   FINDINGS: Single AP view of the chest.   Right subclavian vein approach pacemaker/AICD in place with leads projecting over the right atrium and ventricle. Enteric tube coursing below diaphragm tip overlying the expected position of the gastric body.   The cardiomediastinal silhouette is obscured, similar to prior exam.   Persistent near-complete opacification of the left hemithorax, with mild interval increase in air bronchograms within the left upper lung zone. Redemonstration of patchy right infrahilar airspace opacities and interstitial prominence on the right. No right-sided pleural effusion or pneumothorax.   No acute osseous abnormality.       1. Persistent near-complete opacification of the left hemithorax, with mild interval increase in air bronchograms within the left upper lung zone. 2. Persistent patchy right infrahilar airspace opacities and interstitial prominence throughout the right lung, which may represent aspiration/pneumonia in the appropriate clinical setting. 3. Medical devices as above.   I personally reviewed the images/study and I agree with the findings as stated by Dr. Raymond Armstrong M.D. This study was interpreted at [facility], [place], [place].   MACRO:  None   Signed by: Jovany Nuñez 5/23/2024 7:32 AM Dictation workstation:   [id number]    CT chest abdomen pelvis wo IV contrast    Result Date: 5/23/2024  Interpreted By:  Sohail Torres,  and Jeff Ibrahim STUDY: CT CHEST ABDOMEN PELVIS WO CONTRAST;  5/22/2024 12:15 pm   INDICATION: Signs/Symptoms:c/f sepsis.   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 N/V/D with coffe grounds concernign for UPI GIB and ongoing fevers.   COMPARISON: CT chest/abdomen/pelvis 05/18/2024   ACCESSION NUMBER(S): [id number]   ORDERING CLINICIAN: SUN CHAMPAGNE   TECHNIQUE: CT of the chest, abdomen and pelvis was performed. Contiguous axial images were obtained at 3 mm slice thickness through the chest, abdomen and pelvis. Coronal and sagittal reconstructions at 3 mm slice thickness were performed.  No intravenous contrast was administered; positive oral contrast was given.   FINDINGS: Please note that the study is limited without intravenous contrast.   Respiratory motion artifact.   CHEST:   LUNG/PLEURA/LARGE AIRWAYS: Trachea and bilateral mainstem bronchi are patent.   Again seen complete opacification of the left hemithorax with air bronchograms consistent with significant collapse of the left lung.   There is left pleural thickening (example series 2, image 38) with a focus of left posterior chest wall extension at the level of the 9th-10th (series 2, image 71) and 10th-11th (series 2, image 82) rib spaces, similar to prior, findings consistent with patient's known mesothelioma.   Similar small loculated pleural effusion measuring 7.1 x 4.1 x 2.3 cm (series 2, image 66 and series 900, image 62).   Interval improvement of right lower lobe ill-defined patchy infiltrate (series 4, image 143). Slight interval increase in right trace pleural effusion. No new focal consolidations. No evidence of pneumothorax.    VESSELS: The previously noted ill-defined hypodensity in the left pulmonary artery is not well evaluated on this exam due to beam hardening artifact, positioning of patient's arms, and lack of venous contrast.   Dilated main pulmonary artery measuring 3.8 cm (series 2, image 40), similar to prior. Ectatic ascending aorta measuring 4.1 cm, similar to prior.  Mild to moderate coronary artery calcifications are present.   HEART: The heart is normal in size.  Trace pericardial fluid, similar to prior. Right-sided subclavian approach dual-chambercardiac ICD/pacemaker, with the leads in the right atrium and right ventricle.   MEDIASTINUM AND ANDREA: Multiple prominent mediastinal and perihilar lymph nodes similar to prior. For example:   1.3 cm subcarinal lymph node (series 2, image 48).   1 cm perihilar lymph node (series 2, image 33).   Enteric tube courses through the esophagus and terminates in the body of the stomach. Otherwise otherwise esophagus is within normal limits.   CHEST WALL AND LOWER NECK: Right chest wall cardiac pacemaker as described above. The visualized thyroid gland appears within normal limits.   ABDOMEN:   LIVER: The liver is normal in size. Similar hypoattenuating lesions with the largest measuring 2 cm segment 4A (series 2, image 71).   BILE DUCTS: The bile ducts are not dilated.   GALLBLADDER: The gallbladder is not distended. Calcified stones are noted.   PANCREAS: The pancreas appears unremarkable.   SPLEEN: Within normal limits.   ADRENAL GLANDS: Bilateral adrenal glands appear normal.   KIDNEYS AND URETERS: Bilateral mildly atrophic kidneys with mild perinephric stranding similar to prior. There is a 0.6 cm left midpole nonobstructing renal calculi. No hydroureteronephrosis. No right nephroureterolithiasis.   PELVIS:   BLADDER: The urinary bladder is underdistended with Forrest catheter in place. There is air in the urinary bladder likely from the Forrest.   REPRODUCTIVE ORGANS: No pelvic masses.    BOWEL: NG tube body of the stomach. Otherwise, the stomach is unremarkable. The small is normal in caliber and demonstrate no wall thickening. Proximal sigmoid diverticulosis with interval improvement in the mild wall thickening and adjacent fat stranding involving the proximal sigmoid colon (series 902, image 93). Interval insertion of rectal tube.   VESSELS: Mild atherosclerosis of the abdominal aorta and its branching vessels. There is no aneurysmal dilatation of the abdominal aorta..   Interval flattening of the inferior vena cava (image 2, image 121)/   PERITONEUM/RETROPERITONEUM/LYMPH NODES: No ascites or free air, no fluid collection.  The retroperitoneum appears unremarkable.  No enlarged mesenteric lymph nodes.   ABDOMINAL WALL: Within normal limits.   BONES: No suspicious osseous lesions are present. Degenerative discogenic disease is noted in the lower thoracic and lumbar spine most severe at L3-L4, L4-L5, and L5-L6..       Chest 1. Interval improvement of the right lower lobe patchy infiltrate. 2. Again seen left lung collapse, left pleural thickening with invasion into the left posterior chest wall, and small left loculated pleural effusion consistent with patient's known mesothelioma and is unchanged when compared to prior CT from 05/18/2024. 3. Unchanged prominent mediastinal and perihilar lymph nodes. 4. Previously noted left pulmonary artery thrombus is not well evaluated on this exam due to beam hardening artifact, positioning of patient's arms, and lack of intravenous contrast.   Abdomen-Pelvis 1. Interval flattening of the inferior vena cava which may be seen with hypovolemia. Recommend correlation with patient's fluid volume status. 2. Improved proximal sigmoid diverticulitis. 3. Additional unchanged findings as noted above.   I personally reviewed the images/study and I agree with the findings as stated by Patrice Aguilar DO, PGY-2. This study was interpreted at [facility]  [place], [place].   MACRO: None   Signed by: Sohail Torres 5/23/2024 12:22 AM Dictation workstation:   [id number]    Transthoracic Echo (TTE) Complete    Result Date: 5/21/2024   [facility], [address] [place] [address]                Tel [phone] and Fax [phone] TRANSTHORACIC ECHOCARDIOGRAM REPORT  Patient Name:      ZAKIYA SHAHEED LAWSON       Reading Physician:    [id number] Delvis Miller MD Study Date:        5/21/2024            Ordering Provider:    [id number] SUN CHAMPAGNE MRN/PID:           [id number]             Fellow: Accession#:        [id number]         Nurse: Date of Birth/Age: 1935 / 88 years Sonographer:          Dionisio Braga RDCS Gender:            M                    Additional Staff: Height:            172.72 cm            Admit Date: Weight:            109.77 kg            Admission Status:     Inpatient -                                                               Routine BSA / BMI:         2.22 m2 / 36.80      Encounter#:           [id number]                    kg/m2                                         Department Location:  [place] [address] Blood Pressure: 96 /57 mmHg Study Type:    TRANSTHORACIC ECHO (TTE) COMPLETE Diagnosis/ICD: Unspecified atrial fibrillation-I48.91; Hypertensive heart                disease with heart failure-I11.0 Indication:    hypoxic resp failure w/ new o2 requirement c/f HF CPT Code:      Echo Complete w Full Doppler-[id number] Patient History: Pertinent History: PAD, afib/flutter, hx DVT on warfarin, HTN, CAD s/p stent,                    mitral regurg, HLD,JOVANNI, basal cell ca on face, s/p removal                    and radiation of malignant mesothelioma, S/p L VATS with                    decort 5/2022. Study Detail: The following  Echo studies were performed: 2D, M-Mode, color flow               and Doppler. Technically challenging study due to body habitus,               patient lying in supine position, poor acoustic windows and               prominent lung artifact. Definity used as a contrast agent for               endocardial border definition. Total contrast used for this               procedure was 2.0 mL via IV push. Unable to obtain suprasternal               notch view.  PHYSICIAN INTERPRETATION: Left Ventricle: The left ventricular systolic function is hyperdynamic, with an estimated ejection fraction of 70-75%. The patient is in atrial fibrillation which may influence the estimate of left ventricular function and transvalvular flows. There are no regional wall motion abnormalities. The left ventricular cavity size is normal. Left ventricular diastolic filling was not assessed. Left Atrium: The left atrium was not well visualized. Right Ventricle: The right ventricle was not well visualized. Unable to determine right ventricular systolic function. Right Atrium: The right atrium was not well visualized. Aortic Valve: The aortic valve is trileaflet. There is trivial aortic valve regurgitation. The peak instantaneous gradient of the aortic valve is 13.2 mmHg. Mitral Valve: The mitral valve is normal in structure. There is mild mitral annular calcification. There is trace mitral valve regurgitation. Tricuspid Valve: The tricuspid valve was not well visualized. Tricuspid regurgitation was not well visualized. Tricuspid regurgitation was not assessed. The Doppler estimated RVSP is mildly elevated at 36.6 mmHg. Pulmonic Valve: The pulmonic valve is not well visualized. There is physiologic pulmonic valve regurgitation. Pericardium: There is a trivial pericardial effusion. Aorta: The aortic root is abnormal. The aortic root appears moderately dilated and measures 4.50 cm. The Asc Ao is 3.70 cm. There is mild dilatation of the ascending  aorta. Systemic Veins: The inferior vena cava appears to be of normal size. There is IVC inspiratory collapse greater than 50%. In comparison to the previous echocardiogram(s): Compared with study from 5/3/2022, the previous study was a MARY during a MARY/DCCV and comparison is limited.  CONCLUSIONS:  1. Poorly visualized anatomical structures due to suboptimal image quality.  2. Left ventricular systolic function is hyperdynamic with a 70-75% estimated ejection fraction.  3. The patient is in atrial fibrillation which may influence the estimate of left ventricular function and transvalvular flows.  4. Mildly elevated RVSP.  5. Moderately dilated aortic root. QUANTITATIVE DATA SUMMARY: 2D MEASUREMENTS:                          Normal Ranges: Ao Root d:     4.50 cm   (2.0-3.7cm) LAs:           2.50 cm   (2.7-4.0cm) IVSd:          0.80 cm   (0.6-1.1cm) LVPWd:         0.80 cm   (0.6-1.1cm) LVIDd:         4.10 cm   (3.9-5.9cm) LVIDs:         2.20 cm LV Mass Index: 43.9 g/m2 LV % FS        46.3 % AORTA MEASUREMENTS:                    Normal Ranges: Asc Ao, d: 3.70 cm (2.1-3.4cm) LV SYSTOLIC FUNCTION BY 2D PLANIMETRY (MOD):                     Normal Ranges: EF-A4C View: 73.6 % (>=55%) EF-A2C View: 80.4 % EF-Biplane:  78.8 % LV DIASTOLIC FUNCTION:                     Normal Ranges: MV Peak E: 1.09 m/s (0.7-1.2 m/s) MV DT:     245 msec (150-240 msec) MITRAL VALVE:                 Normal Ranges: MV DT: 245 msec (150-240msec) AORTIC VALVE:                          Normal Ranges: AoV Vmax:      1.82 m/s  (<=1.7m/s) AoV Peak P.2 mmHg (<20mmHg) LVOT Max Philipp:  1.42 m/s  (<=1.1m/s) LVOT VTI:      18.80 cm LVOT Diameter: 2.00 cm   (1.8-2.4cm) AoV Area,Vmax: 2.45 cm2  (2.5-4.5cm2)  RIGHT VENTRICLE: TAPSE: 15.8 mm RV s'  0.18 m/s TRICUSPID VALVE/RVSP:                             Normal Ranges: Peak TR Velocity: 2.90 m/s RV Syst Pressure: 36.6 mmHg (< 30mmHg) PULMONIC VALVE:                      Normal Ranges: PV Max Philipp: 0.9  m/s  (0.6-0.9m/s) PV Max PG:  3.2 mmHg  [id number] Delvis Miller MD Electronically signed on 5/21/2024 at 5:46:15 PM  ** Final **     XR abdomen 1 view    Result Date: 5/21/2024  Interpreted By:  Jovany uNñez, STUDY: XR ABDOMEN 1 VIEW;  5/21/2024 12:58 pm   INDICATION: Signs/Symptoms:post ngt.   COMPARISON: One-view abdomen 05/21/2024 13 a.m.   ACCESSION NUMBER(S): [id number]   ORDERING CLINICIAN: SUN CHAMPAGNE   FINDINGS: One-view abdomen   An enteric tube is positioned within the region of the gastric fundus several cm below the expected gastroesophageal junction. There is a predominantly fluid-filled small bowel and colon pattern. There is less gastric distention with air.   Opacification of the left thorax and multiple air bronchograms are noted.       1.  The enteric tube is positioned within the gastric fundus 2. Advancement of the tube would be recommended for more optimal positioning 3. Predominantly fluid-filled small bowel and colon pattern 4. Opacification of the visualized left thorax and multiple air bronchograms within the left lung   MACRO: None   Signed by: Jovany Nuñez 5/21/2024 2:19 PM Dictation workstation:   [id number]    XR abdomen 1 view    Result Date: 5/21/2024  Interpreted By:  Jovany Nuñez, STUDY: XR ABDOMEN 1 VIEW;  5/21/2024 11:13 am   INDICATION: Signs/Symptoms:ABD DISTENTION.   COMPARISON: None.   ACCESSION NUMBER(S): [id number]   ORDERING CLINICIAN: SUN CHAMPAGNE   FINDINGS: The stomach is distended with air. There is a predominantly fluid-filled small bowel and colon pattern. Gas is demonstrated within the colon and distal ileum. The left ventricular pacemaker electrode appears in adequate position. The left ventricle appears mildly enlarged. Osseous and articular anatomy is normal for age.       1.  The stomach is moderately distended with air otherwise, nonspecific predominantly fluid-filled bowel pattern   MACRO: None   Signed by: Jovany Nuñez 5/21/2024 2:17 PM Dictation  workstation:   [id number]    XR chest 1 view    Result Date: 5/21/2024  Interpreted By:  Jovany Nuñez  and Guicho Castro STUDY: XR CHEST 1 VIEW;  5/21/2024 6:14 am   INDICATION: Signs/Symptoms:SOB.   COMPARISON: Chest radiograph 05/16/2024 CT chest 05/18/2024   ACCESSION NUMBER(S): [id number]   ORDERING CLINICIAN: SUN CHAMPAGNE   FINDINGS: AP radiograph of the chest was provided.   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   CARDIOMEDIASTINAL SILHOUETTE: Cardiomediastinal silhouette is stable in size with complete obscuration of the left cardiomediastinal border.   LUNGS: Persistent near-complete opacification of the left hemithorax with interval reduced air bronchograms compared to prior. Persistent patchy right infrahilar airspace opacities are noted. Prominent perihilar and interstitial lung markings are noted on the right. No consolidation, effusion, or pneumothorax on the right.   ABDOMEN: No remarkable upper abdominal findings.   BONES: No acute osseous changes.       1. Virtually complete opacification of the left hemithorax with interval reduced air bronchograms. 2. Persistent patchy right infrahilar airspace opacities which may reflect aspiration changes and/or pneumonia in the appropriate clinical setting.   I personally reviewed the images/study and I agree with the findings as stated by Matthew Lindo MD. This study was interpreted at [place], [place].   MACRO: None   Signed by: Jovany Nuñez 5/21/2024 11:26 AM Dictation workstation:   [id number]    XR abdomen 1 view    Result Date: 5/20/2024  Interpreted By:  Sofya Ahumada, STUDY: XR ABDOMEN 1 VIEW;  5/19/2024 10:42 pm   INDICATION: Signs/Symptoms:abdominal distension.   COMPARISON: CT: 05/18/2024   ACCESSION NUMBER(S): [id number]   ORDERING CLINICIAN: SUN CHAMPAGNE   FINDINGS: Nonobstructive bowel gas pattern.  Limited evaluation of pneumoperitoneum on supine imaging, however no gross evidence of free air is noted.   Whiteout throughout the left hemithorax.   Osseous structures demonstrate no acute bony changes.       1.  Nonspecific and nonobstructive gas pattern.   MACRO: None   Signed by: Sofya Ahumada 5/20/2024 8:47 AM Dictation workstation:   [id number]    ECG 12 lead    Result Date: 5/18/2024  Sinus tachycardia with 1st degree AV block Lateral infarct , age undetermined Inferior infarct (cited on or before 10-DEC-2023) Prolonged QT Abnormal ECG When compared with ECG of 10-DEC-2023 11:23, Significant changes have occurred See ED provider note for full interpretation and clinical correlation Confirmed by Renee Dotson ([id number]) on 5/18/2024 11:02:23 AM    CT chest abdomen pelvis w IV contrast    Result Date: 5/18/2024  Interpreted By:  Collins Conde, STUDY: CT CHEST ABDOMEN PELVIS W IV CONTRAST;  5/18/2024 8:38 am   INDICATION: Signs/Symptoms:abd pain, LLQ TTP.   COMPARISON: Chest CT scan 12/10/2020. Chest and abdomen CT scan dated 06/14/2023.   ACCESSION NUMBER(S): [id number]   ORDERING CLINICIAN: THEA MURRAY   TECHNIQUE: CT of the chest, abdomen, and pelvis was performed.  Contiguous axial images were obtained at 3 mm slice thickness through the chest, abdomen and pelvis. Coronal and sagittal reconstructions at 3 mm slice thickness were performed. 75 ml of contrast Omnipaque were administered intravenously without immediate complication.   FINDINGS: CHEST:   LUNG/PLEURA/LARGE AIRWAYS: Significant collapse of the left lung with heterogenous appearance, pleural thickening and small loculated pleural fluid measuring 7.8 cm consistent with the patient's known mesothelioma. Focus of left posterior chest wall extension (series 201, image 77 and image 89) at the level of the 9th 10th and 10th 11th rib spaces   Right lower lobe ill-defined patchy infiltrate measuring 2.3 x 1.4 cm. Trace right-sided pleural  effusion with surrounding atelectasis.   Redemonstrated left hilar ill-defined enhancing fullness appears extending to the left pulmonary trunk possibly tumor thrombus and felt less likely bland thrombus.   VESSELS: The aforementioned left hilar ill-defined soft tissue fullness appears extending to the left pulmonary trunk with almost 50% occlusion. Mild coronary artery calcifications   HEART: The heart is normal in size.  Trace pericardial effusion. Right chest wall pacemaker noted.   MEDIASTINUM AND ANDREA: Multiple prominent mediastinal lymph nodes in the largest in the subcarinal region measuring 1.6 cm. The esophagus is normal.   CHEST WALL AND LOWER NECK: The soft tissues of the chest wall demonstrate no gross abnormality. The visualized thyroid gland appears within normal limits.   ABDOMEN:   LIVER: Normal size. Normal contour. Stable hypoattenuating lesions likely benign in the largest segment 4A measuring 2 cm.   BILE DUCTS: The intrahepatic and extrahepatic ducts are not dilated.   GALLBLADDER: Cholelithiasis.   PANCREAS: The pancreas appears unremarkable without evidence of ductal dilatation or masses.   SPLEEN: The spleen is normal in size.   ADRENAL GLANDS: Bilateral adrenal glands appear normal.   KIDNEYS AND URETERS: The kidneys are normal in size and enhance symmetrically.  No hydroureteronephrosis. Nonobstructive left midpole 0.6 cm calculus.   PELVIS:   BLADDER: Underdistended with Forrest catheter in place.   REPRODUCTIVE ORGANS: No pelvic masses.   BOWEL: The stomach, small and large bowel are normal in appearance without wall thickening or obstruction. Proximal sigmoid diverticulosis with mild wall thickening could be related to episodes of underlying mild diverticulitis. No ascites. Pneumoperitoneum.     VESSELS: Mild vascular calcifications and atherosclerotic changes.   PERITONEUM/RETROPERITONEUM/LYMPH NODES: No enlarged intra abdominopelvic lymphadenopathy.   BONE AND SOFT TISSUE: Multilevel  degenerative spine changes and facet joint disease. Trace soft tissue thickening at the level of the umbilicus.       CHEST: 1. Significant collapse of the left lung with heterogenous appearance, pleural thickening and small loculated pleural fluid measuring 7.8 cm consistent with the patient's known mesothelioma which have worsened from the prior CT scan dated 06/14/2023 and grossly unchanged from 12/10/2023 unenhanced CT scan allowing for differences in techniques. Focus of new left posterior chest wall invasion noted at the level of 9th 10th and 10th 11th rib spaces. 2. Right lower lobe ill-defined patchy infiltrate measuring 2.3 x 1.4 cm. Trace right-sided pleural effusion with surrounding atelectasis. 3. Redemonstrated left hilar ill-defined enhancing fullness appears extending to the left pulmonary trunk likely tumor thrombus and felt less likely bland thrombus which has significantly worsened from 06/14/2023 CT scan. 4. Mildly enlarged multiple mediastinal lymph nodes in the largest in the subcarinal region measuring 1.6 cm, grossly unchanged from prior.   ABDOMEN-PELVIS: 1. Proximal sigmoid diverticulosis with mild wall thickening could be related to episodes of underlying mild uncomplicated diverticulitis 2. Other ancillary findings are unchanged.   The significant results of the study were relayed by me to and acknowledged by Janes Nichols NP on 05/18/2024 at 10:40 a.m. over the phone.   MACRO: None   Signed by: Collins Conde 5/18/2024 10:47 AM Dictation workstation:   [id number]    US renal complete    Result Date: 5/17/2024  Interpreted By:  Karel Lowry, STUDY: US RENAL COMPLETE;  5/17/2024 8:02 pm   INDICATION: Signs/Symptoms:Urinary retention?.   COMPARISON: CT abdomen pelvis 05/16/2024   ACCESSION NUMBER(S): [id number]   ORDERING CLINICIAN: KEMI AUGUSTINE   TECHNIQUE: Grayscale and color Doppler sonographic images of the kidneys.   FINDINGS:     RIGHT KIDNEY: The right kidney measures 11.3  cm in length. Diffuse renal cortical thinning. No hydronephrosis. No renal calculus. No perinephric fluid.   LEFT KIDNEY: The left kidney measures 11.2 cm in length. Diffuse renal cortical thinning. No hydronephrosis. There is a 1 cm nonobstructing left renal calculus. No perinephric fluid.   URINARY BLADDER: Urinary bladder is decompressed, limiting its evaluation, with Forrest catheter in place.       1. Diffuse bilateral renal cortical thinning in keeping with chronic renal disease. No hydronephrosis. 2. Nonobstructing 1 cm left renal calculus. 3. Decompressed urinary bladder due to Forrest catheter, limiting its evaluation.   MACRO: None.   Signed by: Karel Lowry 5/17/2024 10:07 PM Dictation workstation:   [id number]    CT abdomen pelvis w IV contrast    Result Date: 5/17/2024  STUDY: CT Abdomen and Pelvis with IV Contrast; 05/16/2024 10:27 pm INDICATION: Abdominal pain.  Distension. COMPARISON: CT CAP 06/14/2023 and 03/21/2023. ACCESSION NUMBER(S): [id number] ORDERING CLINICIAN: KAILA HALEY TECHNIQUE: CT of the abdomen and pelvis was performed.  Contiguous axial images were obtained at 3 mm slice thickness through the abdomen and pelvis. Coronal and sagittal reconstructions at 3 mm slice thickness were performed.  Omnipaque 350, 75 mL was administered intravenously.  FINDINGS: LOWER CHEST: Cardiac size is enlarged with pacer leads in the right atrium and right ventricle.  Moderate calcified coronary plaque is noted.  Mild calcified plaque is seen in the included descending thoracic aorta. Thoracic aorta is not aneurysmal.  There is complete collapse and consolidation of the left lung at the left lung base with loculated effusion seen at the left lung base.  Increased AP diameter of the thorax suggests chronic changes of COPD.  Respiratory motion limits assessment in the lung bases and upper abdomen.  Small sliding-type hiatal hernia is noted.  ABDOMEN:  LIVER: Simple fluid density cysts are noted in  the dome of the liver along with subcentimeter hypodensities in the liver which are too small to definitively characterize but statistically most likely represent simple cysts or hemangiomas.  No hepatomegaly.   BILE DUCTS: No intrahepatic or extrahepatic biliary ductal dilatation.  GALLBLADDER: Gallbladder contains gallstones in the gallbladder neck but is otherwise unremarkable.  STOMACH: No abnormalities identified.  PANCREAS: No masses or ductal dilatation.  SPLEEN: No splenomegaly or focal splenic lesion.  ADRENAL GLANDS: No thickening or nodules.  KIDNEYS AND URETERS: Kidneys are normal in location.  Moderate bilateral renal cortical thinning is seen with mild bilateral perinephric stranding. Nonobstructing 6 mm calculus is seen in the mid left kidney.  No ureteral calculi.  PELVIS:  BLADDER: Urinary bladder is decompressed by Forrest catheter, limiting assessment.   REPRODUCTIVE ORGANS: No abnormalities identified.  BOWEL: Diverticulosis is noted throughout the descending colon and sigmoid colon.  There is subtle fat stranding adjacent to the proximal sigmoid colon in the left upper pelvis which may represent mild acute diverticulitis.  Appendix is not definitively identified.  Terminal ileum is unremarkable.   VESSELS: Mild calcified atheromatous plaque is seen in the abdominal aorta and iliac arteries.  No aneurysm.  PERITONEUM/RETROPERITONEUM/LYMPH NODES: No free fluid.  No pneumoperitoneum. No lymphadenopathy.  ABDOMINAL WALL: No abnormalities identified. SOFT TISSUES: No abnormalities identified.  BONES: No acute fracture or aggressive osseous lesion.  Generalized osseous demineralization is noted.  There is a mild levoconvex curvature of the lumbar spine centered at L4.  Moderate multilevel disc disease is seen in the spine.  There is moderate bilateral mid and lower lumbar facet arthrosis.    1.  Subtle inflammatory change along the proximal sigmoid colon which may represent a low-grade acute  diverticulitis.  No definite perforation or abscess. 2.  Complete collapse and consolidation of the left lung with a loculated effusion at the left lung base, unchanged compared to the prior chest CT and likely consistent with patient's reported mesothelioma, possibly chronic post treatment change. 3.  Cardiomegaly with chronic changes suggesting COPD. 4.  Cholelithiasis. 5.  Moderate bilateral renal atrophy with nonobstructing 6 mm left renal calculus. 6.  Additional chronic changes as described. Signed by Lucas Owen    XR chest 1 view    Result Date: 5/16/2024  STUDY: Chest Radiograph;  5/16/2024 9:40 PM INDICATION: Weakness. COMPARISON: 12/10/2023 XR Chest. ACCESSION NUMBER(S): [id number] ORDERING CLINICIAN: KAILA HALEY TECHNIQUE:  Frontal chest was obtained at 21:40 hours. FINDINGS: CARDIOMEDIASTINAL SILHOUETTE: Cardiomediastinal silhouette is normal in size and configuration.  LUNGS: Complete opacification left hemithorax present.  This appears chronic likely related to volume loss and chronic consolidation.  This appears similar prior exams.  ABDOMEN: No remarkable upper abdominal findings.  BONES: No acute osseous changes.    Complete opacification left hemithorax likely related to volume loss and chronic consolidation. Signed by Andrade Angel MD    Lines and Tubes:  Peripheral IV 06/14/24 20 G Left Antecubital (Active)   Placement Date/Time: 06/14/24 1522   Size (Gauge): 20 G  Orientation: Left  Location: Antecubital  Technique: Ultrasound guidance  Placed by: Wesley JANSEN  Insertion attempts: 1   Number of days: 1       Peripheral IV 06/14/24 20 G Right Antecubital (Active)   Placement Date/Time: 06/14/24 2148   Hand Hygiene Completed: Yes  Size (Gauge): 20 G  Orientation: Right  Location: Antecubital  Site Prep: Alcohol;Chlorhexidine    Number of days: 0       External Urinary Catheter Male (Active)   Placement Date/Time: 06/14/24 2200   Hand Hygiene Completed: Yes  External Catheter Type: Male    Number of days: 0         Relevant Results  Recent Results (from the past 24 hour(s))   SST TOP    Collection Time: 06/14/24  9:31 PM   Result Value Ref Range    Extra Tube Hold for add-ons.    B-Type Natriuretic Peptide    Collection Time: 06/14/24  9:32 PM   Result Value Ref Range     (H) 0 - 99 pg/mL   Vancomycin    Collection Time: 06/15/24 12:14 AM   Result Value Ref Range    Vancomycin 17.9 5.0 - 20.0 ug/mL   CBC    Collection Time: 06/15/24  5:37 AM   Result Value Ref Range    WBC 4.8 4.4 - 11.3 x10*3/uL    nRBC 0.0 0.0 - 0.0 /100 WBCs    RBC 2.79 (L) 4.50 - 5.90 x10*6/uL    Hemoglobin 8.2 (L) 13.5 - 17.5 g/dL    Hematocrit 27.2 (L) 41.0 - 52.0 %    MCV 98 80 - 100 fL    MCH 29.4 26.0 - 34.0 pg    MCHC 30.1 (L) 32.0 - 36.0 g/dL    RDW 16.1 (H) 11.5 - 14.5 %    Platelets 142 (L) 150 - 450 x10*3/uL   Comprehensive metabolic panel    Collection Time: 06/15/24  5:37 AM   Result Value Ref Range    Glucose 86 74 - 99 mg/dL    Sodium 139 136 - 145 mmol/L    Potassium 5.0 3.5 - 5.3 mmol/L    Chloride 107 98 - 107 mmol/L    Bicarbonate 23 21 - 32 mmol/L    Anion Gap 14 10 - 20 mmol/L    Urea Nitrogen 48 (H) 6 - 23 mg/dL    Creatinine 3.35 (H) 0.50 - 1.30 mg/dL    eGFR 17 (L) >60 mL/min/1.73m*2    Calcium 8.3 (L) 8.6 - 10.3 mg/dL    Albumin 2.9 (L) 3.4 - 5.0 g/dL    Alkaline Phosphatase 59 33 - 136 U/L    Total Protein 5.8 (L) 6.4 - 8.2 g/dL    AST 28 9 - 39 U/L    Bilirubin, Total 0.4 0.0 - 1.2 mg/dL    ALT 13 10 - 52 U/L   Vancomycin    Collection Time: 06/15/24  5:37 AM   Result Value Ref Range    Vancomycin 15.3 5.0 - 20.0 ug/mL        Radiology:  ECG 12 lead    Result Date: 6/15/2024  Accelerated Junctional rhythm Low voltage QRS Nonspecific ST abnormality Abnormal ECG When compared with ECG of 30-MAY-2024 08:33, Junctional rhythm has replaced Atrial flutter See ED provider note for full interpretation and clinical correlation Confirmed by Chavo Cid ([id number]) on 6/15/2024 11:11:58 AM    US scrotum w  doppler    Result Date: 6/14/2024  Interpreted By:  Julieta Callahan, STUDY: US SCROTUM WITH DOPPLER; 6/14/2024 4:37 pm   INDICATION: Swelling, pain, and erythema.   COMPARISON: None.   ACCESSION NUMBER(S): [id number]   ORDERING CLINICIAN: QUE GUZMAN   TECHNIQUE: Gray scale and color doppler imaging of the scrotum was performed with spectral waveform analysis. Arterial inflow and venous outflow documented. Duplex Doppler interrogation including color flow and spectral waveform analysis is performed.   FINDINGS: Right testis: 3.4 x 2.0 x 3.2 cm.   Left testis: 4.9 x 2.9 x 3.0 cm.   Epididymides: The epididymides are normal in size and echogenicity. There is a 2 x 3 x 3 mm cyst within right epididymal head.   The testes appear homogeneous with no intratesticular lesions. Ectasia of the rete testes on the right is present. Duplex Doppler interrogation of each testicle including color flow and spectral waveform analysis shows normal arterial and venous flow without torsion or orchitis.   There is a 3.6 x 6.0 x 3.0 encapsulated fluid collection anterior and superior to the right testicle containing low-level internal echoes and debris. This does not arise from the right epididymis. This may represent a scrotal wall abscess. There appears to be diffuse thickening of the scrotal wall.       3.0 x 3.6 x 6.0 cm encapsulated fluid collection noted which is extratesticular in location and is seen anterior and superior to the right testicle. This contains low-level internal echoes and some thickening of its wall with internal debris. This may represent a scrotal abscess. Clinical correlation is necessary.   Ectasia of the rete testes on the right.   3 mm cyst right epididymal head.   No epididymal orchitis.   MACRO: None.   Signed by: Julieta Callahan 6/14/2024 4:59 PM Dictation workstation:   [id number]    XR chest 2 views    Result Date: 6/14/2024  Interpreted By:  Josh Soto, STUDY: XR CHEST 2 VIEWS;  6/14/2024 3:10 pm    INDICATION: Signs/Symptoms:Cough.   COMPARISON: 05/28/2024   ACCESSION NUMBER(S): [id number]   ORDERING CLINICIAN: WES ORTIZ   FINDINGS: Pacemaker leads are intact and properly positioned.       CARDIOMEDIASTINAL SILHOUETTE: Cardiomediastinal silhouette is normal in size and configuration.   LUNGS: There is complete opacification left hemithorax with volume loss and shift of the mediastinum to the left.   Since the prior examination further increase in density of the right lung base which may suggest atelectasis or infiltrate. Correlation with auscultation and inflammatory markers recommended. A small to moderate on right pleural effusion persists.   ABDOMEN: No remarkable upper abdominal findings.   BONES: No acute osseous changes.       1.  Complete opacification with volume loss left hemithorax. Increased density right lung base reflecting small to moderate right pleural effusion. Superimposed pneumonia can not be excluded.       MACRO: None   Signed by: Josh Soto 6/14/2024 3:36 PM Dictation workstation:   [id number]    CT abdomen pelvis wo IV contrast    Result Date: 6/14/2024  Interpreted By:  Schoenberger, Joseph, STUDY: CT ABDOMEN PELVIS WO IV CONTRAST;  6/14/2024 2:55 pm   INDICATION: Signs/Symptoms:Scrotal swelling, erythema, erythema extending up into the abdomen.   COMPARISON: 05/22/2024   ACCESSION NUMBER(S): [id number]   ORDERING CLINICIAN: WES ORTIZ   TECHNIQUE: CT of the abdomen and pelvis was performed. Contiguous axial images were obtained at 3 mm slice thickness through the abdomen and pelvis. Coronal and sagittal reconstructions at 3 mm slice thickness were performed.  No intravenous or oral contrast agents were administered.   FINDINGS: Please note that the evaluation of vessels, lymph nodes and organs is limited without intravenous contrast.   LOWER CHEST: There is some persistent volume loss in the left lower lobe with persistent consolidation. There are few air bronchograms.  There is rounded collection of fluid at the anterior aspect of this process which may relate to either necrotizing pneumonia or loculated pleural fluid or conceivably pulmonary abscess. However this is also unchanged from the prior. There is a however, a new moderate dependent layering right pleural effusion.   ABDOMEN:   LIVER: 2 cysts located in the superior aspect the left liver lobe are stable from the prior. No other focal abnormality.   BILE DUCTS: No dilation   GALLBLADDER: Dependent layering calcified gallstones. No wall thickening or pericholecystic fluid.   PANCREAS: Unremarkable   SPLEEN: Unremarkable   ADRENAL GLANDS: Bilateral adrenal glands appear normal.   KIDNEYS AND URETERS: The kidneys are normal in size and unremarkable in appearance.  No hydroureteronephrosis or nephroureterolithiasis is identified. 5 mm nonobstructing lower pole right renal stone unchanged.   PELVIS:   BLADDER: High choose 1   REPRODUCTIVE ORGANS: Unremarkable   BOWEL: The stomach is unremarkable.  Small bowel loops are normal in caliber without mural thickening. Colon is normal in caliber. There is a mobile cecum. There are multiple colonic diverticula. There is no convincing evidence for acute diverticulitis. Normal appendix.   VESSELS: There is no aneurysmal dilatation of the abdominal aorta. The IVC appears normal.   PERITONEUM/RETROPERITONEUM/LYMPH NODES: There is no free or loculated fluid collection, no free intraperitoneal air. The retroperitoneum appears normal.  No abdominopelvic lymphadenopathy is present. The   ABDOMINAL WALL: There is soft tissues Amanda in the abdominal wall which is new from the prior. This is a set metric Roseann more pronounced on the left than on the right. This extends into the lower abdominal left-sided pannus. It does not appear to overtly involve the scrotum. There may be loculated right-sided hydrocele.   BONES: No suspicious osseous lesions are identified. Degenerative discogenic disease is  noted in the lower thoracic and lumbar spine.       1.  The high significant development of abdominal wall edema in the subcutaneous tissues asymmetrically worse on the left. This does not appear to especially extend into the scrotum. 2. There is a new moderate right pleural effusion. 3. Persistent left basal consolidation with anterior oval shaped collection of fluid with similar appearance to prior other than lack of air bronchograms on this exam compared to the prior. Associated volume loss.     MACRO: None   Signed by: Joseph Schoenberger 6/14/2024 3:30 PM Dictation workstation:   [id number]     Current Facility-Administered Medications:     acetaminophen (Tylenol) tablet 650 mg, 650 mg, oral, q4h PRN **OR** acetaminophen (Tylenol) oral liquid 650 mg, 650 mg, nasogastric tube, q4h PRN **OR** acetaminophen (Tylenol) suppository 650 mg, 650 mg, rectal, q4h PRN, Vipul Sotelo MD    albuterol 2.5 mg /3 mL (0.083 %) nebulizer solution 2.5 mg, 2.5 mg, nebulization, q6h PRN, Deondre Wagner MD    benzonatate (Tessalon) capsule 100 mg, 100 mg, oral, TID PRN, Vipul Sotelo MD, 100 mg at 06/15/24 1938    cefepime (Maxipime) in dextrose 5% 50 mL IV 1 g, 1 g, intravenous, q12h, Vipul Sotelo MD, 1 g at 06/15/24 0900    codeine-guaifenesin (Robitussin-AC)  mg/5 mL syrup 5 mL, 5 mL, oral, q6h PRN, Deondre Wagner MD, 5 mL at 06/15/24 1414    dilTIAZem (Cardizem) 125 mg in dextrose 5% 125 mL (1 mg/mL) infusion (premix), 5 mg/hr, intravenous, Continuous, Deondre Wagner MD, Last Rate: 5 mL/hr at 06/15/24 1828, 5 mg/hr at 06/15/24 1828    enoxaparin (Lovenox) syringe 120 mg, 1 mg/kg, subcutaneous, q24h, Vipul Sotelo MD, 120 mg at 06/14/24 2247    melatonin tablet 3 mg, 3 mg, oral, Nightly PRN, Vipul Sotelo MD    metroNIDAZOLE (Flagyl) 500 mg in NaCl (iso-os) 100 mL, 500 mg, intravenous, q8h, Deondre Wagner MD, Stopped at 06/15/24 1750    ondansetron (Zofran) tablet 4 mg, 4 mg, oral, q8h PRN **OR**  ondansetron (Zofran) injection 4 mg, 4 mg, intravenous, q8h PRN, Vipul Sotelo MD    pantoprazole (ProtoNix) EC tablet 40 mg, 40 mg, oral, Daily before breakfast, 40 mg at 06/15/24 0603 **OR** pantoprazole (ProtoNix) injection 40 mg, 40 mg, intravenous, Daily before breakfast, Vipul Sotelo MD    polyethylene glycol (Glycolax, Miralax) packet 17 g, 17 g, oral, Daily PRN, Vipul Sotelo MD    rosuvastatin (Crestor) tablet 10 mg, 10 mg, oral, Daily, Deondre Wagner MD, 10 mg at 06/15/24 1546    sodium chloride 0.9% infusion, 75 mL/hr, intravenous, Continuous, Deondre Wagner MD, Last Rate: 75 mL/hr at 06/15/24 1828, 75 mL/hr at 06/15/24 1828    tamsulosin (Flomax) 24 hr capsule 0.4 mg, 0.4 mg, oral, Daily, Deondre Wagner MD, 0.4 mg at 06/15/24 1545    vancomycin (Vancocin) in dextrose 5 % water (D5W) 100 mL  mg, 500 mg, intravenous, q24h, Carol Gonzalez, PharmD, Stopped at 06/15/24 1833    vancomycin (Vancocin) pharmacy to dose - pharmacy monitoring, , miscellaneous, Daily PRN, Kati Miller, [facility]   Principal Problem:    Cellulitis of scrotum  Active Problems:    Atrial fibrillation with RVR (Multi)    Benign essential hypertension    Acute on chronic respiratory failure with hypoxia (Multi)    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Multi)    Recurrent pleural effusion    Abdominal wall cellulitis      Assessment/Plan      Extensive cellulitis and edema of the abdominal wall and lower extremities and scrotum 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  Patient recently had clinical trial of chemo and patient did not tolerate.  No further plan for advanced mesothelioma.  Right pleural effusion .patient does not want any thoracentesis and want to be treated for cellulitis now.  Acute on chronic respiratory failure with hypoxia.  COPD continue bronchodilator.  Former  smoker.  Morbid obesity.  Obstructive sleep apnea.  History of A-fib with RVR.  Hypertension.  Dyslipidemia.  GERD.  Patient DNR.  DVT prophylaxis.  PT OT.  P.o. diet.  6/15/2024 patient's leg and abdominal swelling and redness improving.  Mesothelioma is advanced and not able to tolerate any clinical trial chemo.  Patient recently was seen at [facility].  Luis Villasenor MD

## 2024-06-16 NOTE — PROGRESS NOTES
Urology follow-up progress note for Sunday, 6/16/2024  Clinical status about the same  External catheter in place  Good outputs about 1700 cc since yesterday  Serum creatinine 3.22 and stable  Hemoglobin 8.2, WBC count normal at 4500  All cultures so far to date are negative  However agree on continued aggressive antibiotic coverage with cefepime Flagyl vancomycin  Continue supportive care conservative observation and antibiotics for now    Additional medical and historical objective data reviewed and listed below      Current Facility-Administered Medications:     acetaminophen (Tylenol) tablet 650 mg, 650 mg, oral, q4h PRN **OR** acetaminophen (Tylenol) oral liquid 650 mg, 650 mg, nasogastric tube, q4h PRN **OR** acetaminophen (Tylenol) suppository 650 mg, 650 mg, rectal, q4h PRN, Vipul Sotelo MD    albuterol 2.5 mg /3 mL (0.083 %) nebulizer solution 2.5 mg, 2.5 mg, nebulization, q6h PRN, Deondre Wagner MD    benzonatate (Tessalon) capsule 100 mg, 100 mg, oral, TID PRN, Vipul Sotelo MD, 100 mg at 06/15/24 1938    cefepime (Maxipime) in dextrose 5% 50 mL IV 1 g, 1 g, intravenous, q12h, Vipul Sotelo MD, 1 g at 06/16/24 0936    codeine-guaifenesin (Robitussin-AC)  mg/5 mL syrup 5 mL, 5 mL, oral, q6h PRN, Deondre Wagner MD, 5 mL at 06/16/24 0128    dextromethorphan (Delsym) 30 mg/5 mL liquid 30 mg, 30 mg, oral, Nightly PRN, Vipul Sotelo MD, 30 mg at 06/16/24 0308    dilTIAZem (Cardizem) 125 mg in dextrose 5% 125 mL (1 mg/mL) infusion (premix), 10 mg/hr, intravenous, Continuous, Deondre Wagner MD, Last Rate: 10 mL/hr at 06/16/24 0848, 10 mg/hr at 06/16/24 0848    enoxaparin (Lovenox) syringe 120 mg, 1 mg/kg, subcutaneous, q24h, Vipul Sotelo MD, 120 mg at 06/15/24 2041    melatonin tablet 3 mg, 3 mg, oral, Nightly PRN, Vipul Sotelo MD, 3 mg at 06/16/24 0435    metroNIDAZOLE (Flagyl) 500 mg in NaCl (iso-os) 100 mL, 500 mg, intravenous, q8h, Deondre Wagner MD, Stopped at 06/16/24 1519     ondansetron (Zofran) tablet 4 mg, 4 mg, oral, q8h PRN **OR** ondansetron (Zofran) injection 4 mg, 4 mg, intravenous, q8h PRN, Vipul Sotelo MD    pantoprazole (ProtoNix) EC tablet 40 mg, 40 mg, oral, Daily before breakfast, 40 mg at 06/16/24 0435 **OR** pantoprazole (ProtoNix) injection 40 mg, 40 mg, intravenous, Daily before breakfast, Vipul Sotelo MD    polyethylene glycol (Glycolax, Miralax) packet 17 g, 17 g, oral, Daily PRN, Vipul Sotelo MD    rosuvastatin (Crestor) tablet 10 mg, 10 mg, oral, Daily, Deondre Wagner MD, 10 mg at 06/16/24 0825    tamsulosin (Flomax) 24 hr capsule 0.4 mg, 0.4 mg, oral, Daily, Deondre Wagner MD, 0.4 mg at 06/16/24 0825    traMADol (Ultram) tablet 50 mg, 50 mg, oral, q8h PRN, Deondre Wagner MD, 50 mg at 06/16/24 1035    vancomycin (Vancocin) in dextrose 5 % water (D5W) 100 mL  mg, 500 mg, intravenous, q24h, Carol Gonzalez, PharmD, Stopped at 06/15/24 1833    vancomycin (Vancocin) pharmacy to dose - pharmacy monitoring, , miscellaneous, Daily PRN, Kati Miller, [facility]   Results for orders placed or performed during the hospital encounter of 06/14/24 (from the past 96 hour(s))   ECG 12 lead   Result Value Ref Range    Ventricular Rate 126 BPM    Atrial Rate 129 BPM    QRS Duration 78 ms    QT Interval 324 ms    QTC Calculation(Bazett) 469 ms    R Axis 81 degrees    T Axis 42 degrees    QRS Count 20 beats    Q Onset 229 ms    T Offset 391 ms    QTC Fredericia 415 ms   CBC and Auto Differential   Result Value Ref Range    WBC 4.3 (L) 4.4 - 11.3 x10*3/uL    nRBC 0.0 0.0 - 0.0 /100 WBCs    RBC 2.93 (L) 4.50 - 5.90 x10*6/uL    Hemoglobin 8.6 (L) 13.5 - 17.5 g/dL    Hematocrit 28.0 (L) 41.0 - 52.0 %    MCV 96 80 - 100 fL    MCH 29.4 26.0 - 34.0 pg    MCHC 30.7 (L) 32.0 - 36.0 g/dL    RDW 16.2 (H) 11.5 - 14.5 %    Platelets 159 150 - 450 x10*3/uL    Neutrophils % 73.8 40.0 - 80.0 %    Immature Granulocytes %, Automated 1.9 (H) 0.0 - 0.9 %    Lymphocytes % 7.0 13.0  - 44.0 %    Monocytes % 11.5 2.0 - 10.0 %    Eosinophils % 5.6 0.0 - 6.0 %    Basophils % 0.2 0.0 - 2.0 %    Neutrophils Absolute 3.14 1.60 - 5.50 x10*3/uL    Immature Granulocytes Absolute, Automated 0.08 0.00 - 0.50 x10*3/uL    Lymphocytes Absolute 0.30 (L) 0.80 - 3.00 x10*3/uL    Monocytes Absolute 0.49 0.05 - 0.80 x10*3/uL    Eosinophils Absolute 0.24 0.00 - 0.40 x10*3/uL    Basophils Absolute 0.01 0.00 - 0.10 x10*3/uL   Comprehensive metabolic panel   Result Value Ref Range    Glucose 104 (H) 74 - 99 mg/dL    Sodium 140 136 - 145 mmol/L    Potassium 5.1 3.5 - 5.3 mmol/L    Chloride 107 98 - 107 mmol/L    Bicarbonate 26 21 - 32 mmol/L    Anion Gap 12 10 - 20 mmol/L    Urea Nitrogen 51 (H) 6 - 23 mg/dL    Creatinine 3.50 (H) 0.50 - 1.30 mg/dL    eGFR 16 (L) >60 mL/min/1.73m*2    Calcium 8.5 (L) 8.6 - 10.3 mg/dL    Albumin 3.0 (L) 3.4 - 5.0 g/dL    Alkaline Phosphatase 60 33 - 136 U/L    Total Protein 6.2 (L) 6.4 - 8.2 g/dL    AST 31 9 - 39 U/L    Bilirubin, Total 0.3 0.0 - 1.2 mg/dL    ALT 14 10 - 52 U/L   Lipase   Result Value Ref Range    Lipase 26 9 - 82 U/L   Lactate   Result Value Ref Range    Lactate 1.3 0.4 - 2.0 mmol/L   C-Reactive Protein   Result Value Ref Range    C-Reactive Protein 10.32 (H) <1.00 mg/dL   Sedimentation Rate   Result Value Ref Range    Sedimentation Rate 38 (H) 0 - 20 mm/h   Blood Culture    Specimen: Peripheral Venipuncture; Blood culture   Result Value Ref Range    Blood Culture No growth at 1 day    Light Blue Top   Result Value Ref Range    Extra Tube Hold for add-ons.    Lavender Top   Result Value Ref Range    Extra Tube Hold for add-ons.    Blood Culture    Specimen: Peripheral Venipuncture; Blood culture   Result Value Ref Range    Blood Culture No growth at 1 day    SST TOP   Result Value Ref Range    Extra Tube Hold for add-ons.    B-Type Natriuretic Peptide   Result Value Ref Range     (H) 0 - 99 pg/mL   Vancomycin   Result Value Ref Range    Vancomycin 17.9 5.0 -  20.0 ug/mL   CBC   Result Value Ref Range    WBC 4.8 4.4 - 11.3 x10*3/uL    nRBC 0.0 0.0 - 0.0 /100 WBCs    RBC 2.79 (L) 4.50 - 5.90 x10*6/uL    Hemoglobin 8.2 (L) 13.5 - 17.5 g/dL    Hematocrit 27.2 (L) 41.0 - 52.0 %    MCV 98 80 - 100 fL    MCH 29.4 26.0 - 34.0 pg    MCHC 30.1 (L) 32.0 - 36.0 g/dL    RDW 16.1 (H) 11.5 - 14.5 %    Platelets 142 (L) 150 - 450 x10*3/uL   Comprehensive metabolic panel   Result Value Ref Range    Glucose 86 74 - 99 mg/dL    Sodium 139 136 - 145 mmol/L    Potassium 5.0 3.5 - 5.3 mmol/L    Chloride 107 98 - 107 mmol/L    Bicarbonate 23 21 - 32 mmol/L    Anion Gap 14 10 - 20 mmol/L    Urea Nitrogen 48 (H) 6 - 23 mg/dL    Creatinine 3.35 (H) 0.50 - 1.30 mg/dL    eGFR 17 (L) >60 mL/min/1.73m*2    Calcium 8.3 (L) 8.6 - 10.3 mg/dL    Albumin 2.9 (L) 3.4 - 5.0 g/dL    Alkaline Phosphatase 59 33 - 136 U/L    Total Protein 5.8 (L) 6.4 - 8.2 g/dL    AST 28 9 - 39 U/L    Bilirubin, Total 0.4 0.0 - 1.2 mg/dL    ALT 13 10 - 52 U/L   Vancomycin   Result Value Ref Range    Vancomycin 15.3 5.0 - 20.0 ug/mL   Basic Metabolic Panel   Result Value Ref Range    Glucose 104 (H) 74 - 99 mg/dL    Sodium 140 136 - 145 mmol/L    Potassium 4.6 3.5 - 5.3 mmol/L    Chloride 109 (H) 98 - 107 mmol/L    Bicarbonate 23 21 - 32 mmol/L    Anion Gap 13 10 - 20 mmol/L    Urea Nitrogen 46 (H) 6 - 23 mg/dL    Creatinine 3.22 (H) 0.50 - 1.30 mg/dL    eGFR 18 (L) >60 mL/min/1.73m*2    Calcium 7.8 (L) 8.6 - 10.3 mg/dL   CBC and Auto Differential   Result Value Ref Range    WBC 4.8 4.4 - 11.3 x10*3/uL    nRBC 0.0 0.0 - 0.0 /100 WBCs    RBC 2.57 (L) 4.50 - 5.90 x10*6/uL    Hemoglobin 7.5 (L) 13.5 - 17.5 g/dL    Hematocrit 24.3 (L) 41.0 - 52.0 %    MCV 95 80 - 100 fL    MCH 29.2 26.0 - 34.0 pg    MCHC 30.9 (L) 32.0 - 36.0 g/dL    RDW 16.0 (H) 11.5 - 14.5 %    Platelets 143 (L) 150 - 450 x10*3/uL    Neutrophils % 72.4 40.0 - 80.0 %    Immature Granulocytes %, Automated 1.9 (H) 0.0 - 0.9 %    Lymphocytes % 7.8 13.0 - 44.0 %     Monocytes % 13.5 2.0 - 10.0 %    Eosinophils % 4.4 0.0 - 6.0 %    Basophils % 0.0 0.0 - 2.0 %    Neutrophils Absolute 3.44 1.60 - 5.50 x10*3/uL    Immature Granulocytes Absolute, Automated 0.09 0.00 - 0.50 x10*3/uL    Lymphocytes Absolute 0.37 (L) 0.80 - 3.00 x10*3/uL    Monocytes Absolute 0.64 0.05 - 0.80 x10*3/uL    Eosinophils Absolute 0.21 0.00 - 0.40 x10*3/uL    Basophils Absolute 0.00 0.00 - 0.10 x10*3/uL   Magnesium   Result Value Ref Range    Magnesium 1.56 (L) 1.60 - 2.40 mg/dL   SST TOP   Result Value Ref Range    Extra Tube Hold for add-ons.      ECG 12 lead    Result Date: 6/15/2024  Accelerated Junctional rhythm Low voltage QRS Nonspecific ST abnormality Abnormal ECG When compared with ECG of 30-MAY-2024 08:33, Junctional rhythm has replaced Atrial flutter See ED provider note for full interpretation and clinical correlation Confirmed by Chavo Cid ([id number]) on 6/15/2024 11:11:58 AM    US scrotum w doppler    Result Date: 6/14/2024  Interpreted By:  Julieta Callahan, STUDY: US SCROTUM WITH DOPPLER; 6/14/2024 4:37 pm   INDICATION: Swelling, pain, and erythema.   COMPARISON: None.   ACCESSION NUMBER(S): [id number]   ORDERING CLINICIAN: QUE GUZMAN   TECHNIQUE: Gray scale and color doppler imaging of the scrotum was performed with spectral waveform analysis. Arterial inflow and venous outflow documented. Duplex Doppler interrogation including color flow and spectral waveform analysis is performed.   FINDINGS: Right testis: 3.4 x 2.0 x 3.2 cm.   Left testis: 4.9 x 2.9 x 3.0 cm.   Epididymides: The epididymides are normal in size and echogenicity. There is a 2 x 3 x 3 mm cyst within right epididymal head.   The testes appear homogeneous with no intratesticular lesions. Ectasia of the rete testes on the right is present. Duplex Doppler interrogation of each testicle including color flow and spectral waveform analysis shows normal arterial and venous flow without torsion or orchitis.   There is a 3.6 x 6.0 x 3.0  encapsulated fluid collection anterior and superior to the right testicle containing low-level internal echoes and debris. This does not arise from the right epididymis. This may represent a scrotal wall abscess. There appears to be diffuse thickening of the scrotal wall.       3.0 x 3.6 x 6.0 cm encapsulated fluid collection noted which is extratesticular in location and is seen anterior and superior to the right testicle. This contains low-level internal echoes and some thickening of its wall with internal debris. This may represent a scrotal abscess. Clinical correlation is necessary.   Ectasia of the rete testes on the right.   3 mm cyst right epididymal head.   No epididymal orchitis.   MACRO: None.   Signed by: Julieta Callahan 6/14/2024 4:59 PM Dictation workstation:   [id number]    XR chest 2 views    Result Date: 6/14/2024  Interpreted By:  Josh Soto, STUDY: XR CHEST 2 VIEWS;  6/14/2024 3:10 pm   INDICATION: Signs/Symptoms:Cough.   COMPARISON: 05/28/2024   ACCESSION NUMBER(S): [id number]   ORDERING CLINICIAN: WES ORTIZ   FINDINGS: Pacemaker leads are intact and properly positioned.       CARDIOMEDIASTINAL SILHOUETTE: Cardiomediastinal silhouette is normal in size and configuration.   LUNGS: There is complete opacification left hemithorax with volume loss and shift of the mediastinum to the left.   Since the prior examination further increase in density of the right lung base which may suggest atelectasis or infiltrate. Correlation with auscultation and inflammatory markers recommended. A small to moderate on right pleural effusion persists.   ABDOMEN: No remarkable upper abdominal findings.   BONES: No acute osseous changes.       1.  Complete opacification with volume loss left hemithorax. Increased density right lung base reflecting small to moderate right pleural effusion. Superimposed pneumonia can not be excluded.       MACRO: None   Signed by: Josh Soto 6/14/2024 3:36 PM Dictation  workstation:   [id number]    CT abdomen pelvis wo IV contrast    Result Date: 6/14/2024  Interpreted By:  Schoenberger, Joseph, STUDY: CT ABDOMEN PELVIS WO IV CONTRAST;  6/14/2024 2:55 pm   INDICATION: Signs/Symptoms:Scrotal swelling, erythema, erythema extending up into the abdomen.   COMPARISON: 05/22/2024   ACCESSION NUMBER(S): [id number]   ORDERING CLINICIAN: WES ORTIZ   TECHNIQUE: CT of the abdomen and pelvis was performed. Contiguous axial images were obtained at 3 mm slice thickness through the abdomen and pelvis. Coronal and sagittal reconstructions at 3 mm slice thickness were performed.  No intravenous or oral contrast agents were administered.   FINDINGS: Please note that the evaluation of vessels, lymph nodes and organs is limited without intravenous contrast.   LOWER CHEST: There is some persistent volume loss in the left lower lobe with persistent consolidation. There are few air bronchograms. There is rounded collection of fluid at the anterior aspect of this process which may relate to either necrotizing pneumonia or loculated pleural fluid or conceivably pulmonary abscess. However this is also unchanged from the prior. There is a however, a new moderate dependent layering right pleural effusion.   ABDOMEN:   LIVER: 2 cysts located in the superior aspect the left liver lobe are stable from the prior. No other focal abnormality.   BILE DUCTS: No dilation   GALLBLADDER: Dependent layering calcified gallstones. No wall thickening or pericholecystic fluid.   PANCREAS: Unremarkable   SPLEEN: Unremarkable   ADRENAL GLANDS: Bilateral adrenal glands appear normal.   KIDNEYS AND URETERS: The kidneys are normal in size and unremarkable in appearance.  No hydroureteronephrosis or nephroureterolithiasis is identified. 5 mm nonobstructing lower pole right renal stone unchanged.   PELVIS:   BLADDER: High choose 1   REPRODUCTIVE ORGANS: Unremarkable   BOWEL: The stomach is unremarkable.  Small bowel loops  are normal in caliber without mural thickening. Colon is normal in caliber. There is a mobile cecum. There are multiple colonic diverticula. There is no convincing evidence for acute diverticulitis. Normal appendix.   VESSELS: There is no aneurysmal dilatation of the abdominal aorta. The IVC appears normal.   PERITONEUM/RETROPERITONEUM/LYMPH NODES: There is no free or loculated fluid collection, no free intraperitoneal air. The retroperitoneum appears normal.  No abdominopelvic lymphadenopathy is present. The   ABDOMINAL WALL: There is soft tissues Amanda in the abdominal wall which is new from the prior. This is a set metric Roseann more pronounced on the left than on the right. This extends into the lower abdominal left-sided pannus. It does not appear to overtly involve the scrotum. There may be loculated right-sided hydrocele.   BONES: No suspicious osseous lesions are identified. 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       1.  The high significant development of abdominal wall edema in the subcutaneous tissues asymmetrically worse on the left. This does not appear to especially extend into the scrotum. 2. There is a new moderate right pleural effusion. 3. Persistent left basal consolidation with anterior oval shaped collection of fluid with similar appearance to prior other than lack of air bronchograms on this exam compared to the prior. Associated volume loss.     MACRO: None   Signed by: Joseph Schoenberger 6/14/2024 3:30 PM Dictation workstation:   [id number]    US renal complete    Result Date: 6/8/2024  Interpreted By:  Ryan Best and Weaver Jakob STUDY: US RENAL COMPLETE;  6/6/2024 5:51 pm   INDICATION: Signs/Symptoms:VIK.   COMPARISON: None.   ACCESSION NUMBER(S): [id number]   ORDERING CLINICIAN: ADRYAN SIMON   TECHNIQUE: Multiple images of the kidneys were obtained.   FINDINGS: RIGHT KIDNEY: The right kidney measures 11.8 cm in length. The renal  cortical echogenicity is increased. Renal cortical thickness is mildly decreased. No hydronephrosis is present; no evidence of nephrolithiasis.   LEFT KIDNEY: The left kidney is poorly visualized, measuring approximately 11.8 cm. Renal cortical echogenicity is increased. No apparent hydronephrosis or nephrolithiasis.   BLADDER: The urinary bladder is unremarkable in appearance. Poorly visualized small volume free fluid in the left upper quadrant.       Examination is somewhat limited due to patient body habitus, positioning. Within this limitation: Increased renal cortical echogenicity bilaterally which may relate to medical renal disease. No hydronephrosis or nephrolithiasis.   I personally reviewed the images/study and I agree with the findings as stated. This study was interpreted at [facility], [place], [place].   MACRO: None   Signed by: Ryan Urena 6/8/2024 3:02 AM Dictation workstation:   [id number]    ECG 12 Lead    Result Date: 5/31/2024  Atrial flutter with variable AV block with premature ventricular or aberrantly conducted complexes Low voltage QRS Abnormal ECG When compared with ECG of 28-MAY-2024 01:40, Atrial flutter has replaced Atrial fibrillation Nonspecific T wave abnormality, improved in Inferior leads Confirmed by Jovanny Loera ([id number]) on 5/31/2024 1:51:33 PM    Flexible Sigmoidoscopy    Result Date: 5/31/2024  Table formatting from the original result was not included. Impression Dark red blood visualized in the rectum. Blood was cleared without any active bleeding appreciated nor underlying mucosal changes. Few small and large, scattered diverticula with no inflammation in the sigmoid colon; no bleeding was identified. Diverticula were irrigated without any bleeding or stigmata of recent bleed appreciated. All observed locations appeared normal, including the sigmoid colon, rectosigmoid and rectum. No mucosal abnormalities. Normal on  retroflexion. Green bilious stool proximal to the rectum, most notably in the proximal descending colon and transverse colon, without any blood, blood clots or hematin. Findings Dark red blood visualized in the rectum. Blood was cleared without any active bleeding appreciated nor underlying mucosal changes. Few small and large, scattered diverticula with no inflammation in the sigmoid colon; no bleeding was identified. Diverticula were irrigated without any bleeding or stigmata of recent bleed appreciated. All observed locations appeared normal, including the sigmoid colon, rectosigmoid and rectum. No mucosal abnormalities. Normal on retroflexion. Green bilious stool proximal to the rectum, most notably in the proximal descending colon and transverse colon, without any blood, blood clots or hematin. Recommendation  Follow up with primary gastroenterologist  Follow up with inpatient GI consult service, Dr. Valenzuela and Dr. 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 The patient's tolerance of previous anesthesia has been reviewed. The risks and benefits of the procedure and the sedation options and risks were discussed with the patient. All questions were answered and informed consent obtained. Details of the Procedure The patient underwent no sedation. The patient's blood pressure, ECG,  heart rate, level of consciousness, oxygen and respirations were monitored throughout the procedure. A digital rectal exam was performed. A perianal exam was performed. The scope was introduced through the anus and advanced to the transverse colon. Retroflexion was performed in the rectum. The quality of bowel preparation was evaluated using the [place] Bowel Preparation Scale with scores of: left colon = 3. Bowel prep was not adequate. The patient experienced no blood loss. The procedure was not difficult. The patient tolerated the procedure well. There were no apparent adverse events. Events Procedure Events Event Event Time ENDO SCOPE IN TIME 5/24/2024  1:08 PM ENDO SCOPE OUT TIME 5/24/2024  1:26 PM Specimens No specimens collected Procedure Location [facility] Intensive Care [address] [facility] [address] [phone] Referring Provider Grayson Nichols, APRN- [address] [address] Procedure Provider Ale Archibald MD     Lower extremity venous duplex left    Result Date: 5/31/2024  Interpreted By:  Nathan Argueta,  Mone Randolph STUDY: [facility] US LOWER EXTREMITY VENOUS DUPLEX LEFT;  5/30/2024 3:58 pm   INDICATION: Signs/Symptoms:LLE swelling, eval for DVT.   COMPARISON: None.   ACCESSION NUMBER(S): [id number]   ORDERING CLINICIAN: ALEXANDRA AVILA   TECHNIQUE: Vascular ultrasound of the left lower extremity was performed. Real-time compression views as well as Gray scale, color Doppler and spectral Doppler waveform analysis was performed.   FINDINGS: Evaluation of the visualized portions of the left common femoral vein, proximal, mid, and distal femoral vein, and popliteal vein were performed.  Evaluation of the visualized portions of the  posterior tibial and peroneal veins were also performed. Evaluation of the calf veins limited due to edema.   Echogenic intraluminal material in the proximal greater saphenous vein with partial  compressibility and color Doppler signal The evaluated veins demonstrate normal compressibility. There is intact venous flow demonstrating normal respiratory variability and normal augmentation of flow with calf compression. Therefore, there is no ultrasonographic evidence for deep vein thrombosis within the evaluated veins.       1.  No sonographic evidence for deep vein thrombosis within the evaluated veins of the left lower extremity. 2. Nonocclusive thrombus of the proximal greater saphenous vein, a superficial vein. Recommend correlation with concern for superficial thrombophlebitis.   I personally reviewed the images/study and I agree with the findings as stated by Agustín Cheatham MD. This study was interpreted at [place], [place].   MACRO: None   Signed by: Nathan Argueta 5/31/2024 5:32 AM Dictation workstation:   [id number]    ECG 12 Lead    Result Date: 5/30/2024  Atrial flutter with variable AV block Rightward axis Low voltage QRS Nonspecific ST and T wave abnormality Abnormal ECG When compared with ECG of 16-MAY-2024 21:13, Significant changes have occurred Confirmed by Jovanny Loera ([id number]) on 5/30/2024 11:03:21 AM    US chest    Result Date: 5/29/2024  Interpreted By:  Dinh Plunkett, STUDY: US CHEST; 5/29/20243:53 pm   INDICATION: Signs/Symptoms:Thoracentesis (Left) diagnostic/therapeutic.   COMPARISON: None.   ACCESSION NUMBER(S): [id number]   ORDERING CLINICIAN: ALEXANDRA AVILA   TECHNIQUE: INTERVENTIONALIST(S): Dinh Plunkett   CONSENT: The patient/patient's POA/next of kin was informed of the nature of the proposed procedure. The purposes, alternatives, risks, and benefits were explained and discussed. All questions were answered and consent was obtained.   SEDATION: None   TIME OUT: A time out was performed immediately prior to procedure start with the interventional team, correctly identifying the patient name, date of birth, MRN, procedure, anatomy  (including marking of site and side), patient position, procedure consent form, relevant laboratory and imaging test results, antibiotic administration, safety precautions, and procedure-specific equipment needs.   FINDINGS: The patient was placed in the supine position.   The thoracic space was examined with grey scale ultrasound, and an absence of free fluid was demonstrated on ultrasound. Thoracic images were captured to demonstrate. A thoracentesis was not performed.       Absence of free fluid for procedure. A thoracentesis was not performed.   I personally performed and/or directly supervised this study and was present for the entire procedure.   Performed and dictated at [facility].   Signed by: Dinh Plunkett 5/29/2024 3:53 PM Dictation workstation:   [id number]    XR chest 1 view    Result Date: 5/28/2024  Interpreted By:  Jovany Nuñez, STUDY: XR CHEST 1 VIEW;  5/28/2024 8:03 am   INDICATION: Signs/Symptoms:Rule out infection.   COMPARISON: Chest radiograph dated 5/27/2024   ACCESSION NUMBER(S): [id number]   ORDERING CLINICIAN: ALEXANDRA AVILA   FINDINGS: AP radiograph of the chest   Pacemaker electrodes appear in adequate position. There is virtually complete opacification of the left thorax. The heart mediastinum are shifted to the left indicating volume loss. Compensatory hyperaeration of the right lung and pulmonary vascular shunting to the right lung is noted increased from previous study.         1. Virtually complete opacification of the left thorax with heart and mediastinum shifted to the left indicating volume loss 2. Compensatory pulmonary vascular shunting to the right lung       Signed by: Jovany Nuñez 5/28/2024 10:38 AM Dictation workstation:   [id number]    Electrocardiogram, 12-lead PRN ACS symptoms    Result Date: 5/28/2024  Atrial fibrillation with rapid ventricular response Nonspecific ST and T wave abnormality , probably digitalis effect Abnormal  ECG When compared with ECG of 28-MAY-2024 01:39, (unconfirmed) No significant change was found Confirmed by Jovanny Loera ([id number]) on 5/28/2024 10:17:48 AM    FL modified barium swallow study    Result Date: 5/26/2024  Interpreted By:  Sohail Torres  and Xavi Jerez STUDY: FL MODIFIED BARIUM SWALLOW STUDY;; 5/25/2024 9:06 am   INDICATION: Signs/Symptoms:r\o dysphagia.   COMPARISON: None.   ACCESSION NUMBER(S): [id number]   ORDERING CLINICIAN: ALEXANDRA AVILA   TECHNIQUE: MBSS completed. Informed verbal consent obtained prior to completion of exam. Trials of thin, nectar thick,  puree, and regular solids given. Fluoroscopy time :  2 minutes.   SLP: Meri Hurley MS CCC/SLP Phone/Pager: Epic Chat   SPEECH FINDINGS: Reason for referral: Further assessment of oropharyngeal swallow Patient hx: Recent RLL PNA. S/s of aspiration w/3 oz Swallow Protocol Respiratory status: Nasal cannula Previous diet: Reg/thin   FINAL SPEECH RECOMMENDATIONS   Diet recommendations/feeding strategies: Solid Diet Recommendations : Easy to Chew Liquid Diet Recommendations: Thin Medication Administration: Single w/ sips of water, whole in puree as needed   Safe Swallow Strategies/Guidelines: Only present PO intake when awake and alert Supervision/assist w/ PO intake to assure adherence to safe swallow strategies Upright positioning Slow rate/small boluses   Follow-up speech therapy recommended: Yes.   Short term goals: Pt will tolerate least restrictive diet with adequate oral phase and no s/s of aspiration. Date initiated: 5/25/24 Status: Goal initiated   Pt will adhere to safe swallow strategies during PO intake with > 90% accuracy independently. Date initiated: 5/25/24 Status: Goal initiated   Education provided: Yes.     Mechanics of the swallow summary: *Oral phase: Mild deficits.   Adequate bolus acceptance.  Prolonged mastication and bolus formation/transit w/ regular solids.  No oral residue.   *Pharyngeal phase: Mild deficits.    Incomplete epiglottic inversion, may be related to presence of NGT. Timely swallow initiation.  Adequate hyolaryngeal elevation/excursion.  Trace penetration x1 with consecutive boluses of thin liquids related to incomplete airway protection.  Ejected upon swallow completion.  No other penetration and no aspiration with any other consistency assessed.  Mild residue at the valleculae following the swallow.  Largely cleared with liquid wash.  Mildly reduced distention of PES.     SLP impressions with severity rating: Mild oropharyngeal dysphagia characterized by prolonged mastication/bolus formation and mild residue at the valleculae following the swallow.   Thin Liquids (MBSS) Rosenbek's Penetration Aspiration Scale, Thin Liquids (MBSS): 2. PENETRATION that CLEARS - contrast enter airway, above vocal cords, no residue     Nectar Thick Liquids (MBSS) Rosenbek's Penetration Aspiration Scale, Nectar thick liquids (MBSS): 1. NO ASPIRATION & NO PENETRATION - no aspiration, contrast does not enter airway       Purees (MBSS) Rosenbek's Penetration Aspiration Scale, Purees (MBSS): 1. NO ASPIRATION & NO PENETRATION - no aspiration, contrast does not enter airway     Solids (MBSS) Rosenbek's Penetration Aspiration Scale, Solids (MBSS): 1. NO ASPIRATION & NO PENETRATION - no aspiration, contrast does not enter airway     Speech Therapy section of this report signed by Meri Hurley MS CCC/SLP on 5/25/2024 at 10:17 am.   RADIOLOGY FINDINGS: Nasoenteric tube is partially visualized. No evidence of acute osseous abnormality of the cervical spine. Patient is edentulous.   Radiology section of this report signed by Dr. Torres.       1. No radiographic evidence of acute process in the neck. 2. Please refer to speech pathology report for additional findings.   MACRO: None   Signed by: Sohail Torres 5/26/2024 3:51 PM Dictation workstation:   [id number]    XR abdomen 1 view    Result Date: 5/25/2024  Interpreted By:  Love  Marcie Rodriguez, STUDY: XR ABDOMEN 1 VIEW; 5/25/2024 5:45 pm   INDICATION: Signs/Symptoms:ngt placed.   COMPARISON: Radiograph dated 05/25/2024, 2:33 p.m.   ACCESSION NUMBER(S): [id number]   ORDERING CLINICIAN: ALEXANDRA AVILA   FINDINGS: Single-view of the abdomen. The pelvis is not included. Enteric tube is in place with the tip projecting over the stomach. Positive contrast is identified in the gastric fundus. Prominent loops of bowel throughout the visualized upper abdomen. Nonobstructive bowel gas pattern.  Limited evaluation of pneumoperitoneum on supine imaging, however no gross evidence of free air is noted.   Extensive consolidation in visualized portion of the left lung.   Osseous structures demonstrate no acute bony changes.       1.  Enteric tube projecting over the proximal stomach. 2. Again seen multiple prominent loops of bowel throughout the upper abdomen. Correlation with ileus. Recommend follow-up radiograph.   Signed by: Marcie Rodriguez 5/25/2024 5:54 PM Dictation workstation:   [id number]    XR abdomen 1 view    Result Date: 5/25/2024  Interpreted By:  Marcie Shah, STUDY: XR ABDOMEN 1 VIEW; 5/25/2024 3:06 pm   INDICATION: Signs/Symptoms:distended abdomen, assess for dilated bowel loops.   COMPARISON: Radiograph dated 05/21/2024   ACCESSION NUMBER(S): [id number]   ORDERING CLINICIAN: ALEXANDRA AVILA   FINDINGS: Views of the abdomen and pelvis. What is presumed to be enteric tube is projecting over the lower mediastinum. Positive contrast is identified in the expected location of the stomach. There is also positive contrast throughout the loops of bowel in the lower abdomen. Prominent loops of colon. Limited evaluation of pneumoperitoneum on supine imaging, however no gross evidence of free air is noted.   Consolidative opacities in left lung.   Osseous structures demonstrate no acute bony changes.       1.  Prominent loops of colon which can be due to ileus. Recommend follow-up  radiograph.   Signed by: Dallasvinicioshelby Lovealexandre Rodriguez 5/25/2024 5:33 PM Dictation workstation:   [id number]    XR chest 1 view    Result Date: 5/23/2024  Interpreted By:  Jovany Nuñez and Baker Zachary STUDY: XR CHEST 1 VIEW; ;  5/22/2024 6:45 pm   INDICATION: Signs/Symptoms:Worsening tachypnea, confusion.   COMPARISON: Chest radiograph on 05/21/2024.   ACCESSION NUMBER(S): [id number]   ORDERING CLINICIAN: SUN CHAMPAGNE   FINDINGS: Single AP view of the chest.   Right subclavian vein approach pacemaker/AICD in place with leads projecting over the right atrium and ventricle. Enteric tube coursing below diaphragm tip overlying the expected position of the gastric body.   The cardiomediastinal silhouette is obscured, similar to prior exam.   Persistent near-complete opacification of the left hemithorax, with mild interval increase in air bronchograms within the left upper lung zone. Redemonstration of patchy right infrahilar airspace opacities and interstitial prominence on the right. No right-sided pleural effusion or pneumothorax.   No acute osseous abnormality.       1. Persistent near-complete opacification of the left hemithorax, with mild interval increase in air bronchograms within the left upper lung zone. 2. Persistent patchy right infrahilar airspace opacities and interstitial prominence throughout the right lung, which may represent aspiration/pneumonia in the appropriate clinical setting. 3. Medical devices as above.   I personally reviewed the images/study and I agree with the findings as stated by Dr. Raymond Armstrong M.D. This study was interpreted at [facility], [place], [place].   MACRO: None   Signed by: Jovany Nuñez 5/23/2024 7:32 AM Dictation workstation:   [id number]    CT chest abdomen pelvis wo IV contrast    Result Date: 5/23/2024  Interpreted By:  Sohail Torres,  and Jeff Ibrahim STUDY: CT CHEST ABDOMEN PELVIS WO CONTRAST;  5/22/2024 12:15 pm    INDICATION: Signs/Symptoms:c/f sepsis.   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 N/V/D with [employer] concernign for UPI GIB and ongoing fevers.   COMPARISON: CT chest/abdomen/pelvis 05/18/2024   ACCESSION NUMBER(S): [id number]   ORDERING CLINICIAN: SUN CHAMPAGNE   TECHNIQUE: CT of the chest, abdomen and pelvis was performed. Contiguous axial images were obtained at 3 mm slice thickness through the chest, abdomen and pelvis. Coronal and sagittal reconstructions at 3 mm slice thickness were performed.  No intravenous contrast was administered; positive oral contrast was given.   FINDINGS: Please note that the study is limited without intravenous contrast.   Respiratory motion artifact.   CHEST:   LUNG/PLEURA/LARGE AIRWAYS: Trachea and bilateral mainstem bronchi are patent.   Again seen complete opacification of the left hemithorax with air bronchograms consistent with significant collapse of the left lung.   There is left pleural thickening (example series 2, image 38) with a focus of left posterior chest wall extension at the level of the 9th-10th (series 2, image 71) and 10th-11th (series 2, image 82) rib spaces, similar to prior, findings consistent with patient's known mesothelioma.   Similar small loculated pleural effusion measuring 7.1 x 4.1 x 2.3 cm (series 2, image 66 and series 900, image 62).   Interval improvement of right lower lobe ill-defined patchy infiltrate (series 4, image 143). Slight interval increase in right trace pleural effusion. No new focal consolidations. No evidence of pneumothorax.   VESSELS: The previously noted ill-defined hypodensity in the left pulmonary artery is not well evaluated on this exam due to beam hardening artifact, positioning of patient's arms, and lack of venous contrast.   Dilated main pulmonary artery measuring 3.8 cm (series 2, image 40), similar  to prior. Ectatic ascending aorta measuring 4.1 cm, similar to prior.  Mild to moderate coronary artery calcifications are present.   HEART: The heart is normal in size.  Trace pericardial fluid, similar to prior. Right-sided subclavian approach dual-chambercardiac ICD/pacemaker, with the leads in the right atrium and right ventricle.   MEDIASTINUM AND ANDREA: Multiple prominent mediastinal and perihilar lymph nodes similar to prior. For example:   1.3 cm subcarinal lymph node (series 2, image 48).   1 cm perihilar lymph node (series 2, image 33).   Enteric tube courses through the esophagus and terminates in the body of the stomach. Otherwise otherwise esophagus is within normal limits.   CHEST WALL AND LOWER NECK: Right chest wall cardiac pacemaker as described above. The visualized thyroid gland appears within normal limits.   ABDOMEN:   LIVER: The liver is normal in size. Similar hypoattenuating lesions with the largest measuring 2 cm segment 4A (series 2, image 71).   BILE DUCTS: The bile ducts are not dilated.   GALLBLADDER: The gallbladder is not distended. Calcified stones are noted.   PANCREAS: The pancreas appears unremarkable.   SPLEEN: Within normal limits.   ADRENAL GLANDS: Bilateral adrenal glands appear normal.   KIDNEYS AND URETERS: Bilateral mildly atrophic kidneys with mild perinephric stranding similar to prior. There is a 0.6 cm left midpole nonobstructing renal calculi. No hydroureteronephrosis. No right nephroureterolithiasis.   PELVIS:   BLADDER: The urinary bladder is underdistended with Forrest catheter in place. There is air in the urinary bladder likely from the Forrest.   REPRODUCTIVE ORGANS: No pelvic masses.   BOWEL: NG tube body of the stomach. Otherwise, the stomach is unremarkable. The small is normal in caliber and demonstrate no wall thickening. Proximal sigmoid diverticulosis with interval improvement in the mild wall thickening and adjacent fat stranding involving the proximal sigmoid  colon (series 902, image 93). Interval insertion of rectal tube.   VESSELS: Mild atherosclerosis of the abdominal aorta and its branching vessels. There is no aneurysmal dilatation of the abdominal aorta..   Interval flattening of the inferior vena cava (image 2, image 121)/   PERITONEUM/RETROPERITONEUM/LYMPH NODES: No ascites or free air, no fluid collection.  The retroperitoneum appears unremarkable.  No enlarged mesenteric lymph nodes.   ABDOMINAL WALL: Within normal limits.   BONES: No suspicious osseous lesions are present. Degenerative discogenic disease is noted in the lower thoracic and lumbar spine most severe at L3-L4, L4-L5, and L5-L6..       Chest 1. Interval improvement of the right lower lobe patchy infiltrate. 2. Again seen left lung collapse, left pleural thickening with invasion into the left posterior chest wall, and small left loculated pleural effusion consistent with patient's known mesothelioma and is unchanged when compared to prior CT from 05/18/2024. 3. Unchanged prominent mediastinal and perihilar lymph nodes. 4. Previously noted left pulmonary artery thrombus is not well evaluated on this exam due to beam hardening artifact, positioning of patient's arms, and lack of intravenous contrast.   Abdomen-Pelvis 1. Interval flattening of the inferior vena cava which may be seen with hypovolemia. Recommend correlation with patient's fluid volume status. 2. Improved proximal sigmoid diverticulitis. 3. Additional unchanged findings as noted above.   I personally reviewed the images/study and I agree with the findings as stated by Patrice Aguilar DO, PGY-2. This study was interpreted at [facility], [place], [place].   MACRO: None   Signed by: Sohail Torres 5/23/2024 12:22 AM Dictation workstation:   [id number]    Transthoracic Echo (TTE) Complete    Result Date: 5/21/2024   [facility], [address] [place] [address]                 Tel [phone] and Fax [phone] TRANSTHORACIC ECHOCARDIOGRAM REPORT  Patient Name:      ZAKIYA LAWSON       Reading Physician:    [id number] Delvis Miller MD Study Date:        5/21/2024            Ordering Provider:    [id number] SUN KWADWO                                                               IHSAN MRN/PID:           [id number]             Fellow: Accession#:        [id number]         Nurse: Date of Birth/Age: 1935 / 88 years Sonographer:          Dionisio Braga RDCS Gender:            M                    Additional Staff: Height:            172.72 cm            Admit Date: Weight:            109.77 kg            Admission Status:     Inpatient -                                                               Routine BSA / BMI:         2.22 m2 / 36.80      Encounter#:           [id number]                    kg/m2                                         Department Location:  [place] [address] Blood Pressure: 96 /57 mmHg Study Type:    TRANSTHORACIC ECHO (TTE) COMPLETE Diagnosis/ICD: Unspecified atrial fibrillation-I48.91; Hypertensive heart                disease with heart failure-I11.0 Indication:    hypoxic resp failure w/ new o2 requirement c/f HF CPT Code:      Echo Complete w Full Doppler-[id number] Patient History: Pertinent History: PAD, afib/flutter, hx DVT on warfarin, HTN, CAD s/p stent,                    mitral regurg, HLD,JOVANNI, basal cell ca on face, s/p removal                    and radiation of malignant mesothelioma, S/p L VATS with                    decort 5/2022. Study Detail: The following Echo studies were performed: 2D, M-Mode, color flow               and Doppler. Technically challenging study due to body habitus,               patient lying in supine position, poor acoustic windows and               prominent lung artifact. Definity used as a contrast agent for                endocardial border definition. Total contrast used for this               procedure was 2.0 mL via IV push. Unable to obtain suprasternal               notch view.  PHYSICIAN INTERPRETATION: Left Ventricle: The left ventricular systolic function is hyperdynamic, with an estimated ejection fraction of 70-75%. The patient is in atrial fibrillation which may influence the estimate of left ventricular function and transvalvular flows. There are no regional wall motion abnormalities. The left ventricular cavity size is normal. Left ventricular diastolic filling was not assessed. Left Atrium: The left atrium was not well visualized. Right Ventricle: The right ventricle was not well visualized. Unable to determine right ventricular systolic function. Right Atrium: The right atrium was not well visualized. Aortic Valve: The aortic valve is trileaflet. There is trivial aortic valve regurgitation. The peak instantaneous gradient of the aortic valve is 13.2 mmHg. Mitral Valve: The mitral valve is normal in structure. There is mild mitral annular calcification. There is trace mitral valve regurgitation. Tricuspid Valve: The tricuspid valve was not well visualized. Tricuspid regurgitation was not well visualized. Tricuspid regurgitation was not assessed. The Doppler estimated RVSP is mildly elevated at 36.6 mmHg. Pulmonic Valve: The pulmonic valve is not well visualized. There is physiologic pulmonic valve regurgitation. Pericardium: There is a trivial pericardial effusion. Aorta: The aortic root is abnormal. The aortic root appears moderately dilated and measures 4.50 cm. The Asc Ao is 3.70 cm. There is mild dilatation of the ascending aorta. Systemic Veins: The inferior vena cava appears to be of normal size. There is IVC inspiratory collapse greater than 50%. In comparison to the previous echocardiogram(s): Compared with study from 5/3/2022, the previous study was a MARY during a MARY/DCCV and comparison is limited.   CONCLUSIONS:  1. Poorly visualized anatomical structures due to suboptimal image quality.  2. Left ventricular systolic function is hyperdynamic with a 70-75% estimated ejection fraction.  3. The patient is in atrial fibrillation which may influence the estimate of left ventricular function and transvalvular flows.  4. Mildly elevated RVSP.  5. Moderately dilated aortic root. QUANTITATIVE DATA SUMMARY: 2D MEASUREMENTS:                          Normal Ranges: Ao Root d:     4.50 cm   (2.0-3.7cm) LAs:           2.50 cm   (2.7-4.0cm) IVSd:          0.80 cm   (0.6-1.1cm) LVPWd:         0.80 cm   (0.6-1.1cm) LVIDd:         4.10 cm   (3.9-5.9cm) LVIDs:         2.20 cm LV Mass Index: 43.9 g/m2 LV % FS        46.3 % AORTA MEASUREMENTS:                    Normal Ranges: Asc Ao, d: 3.70 cm (2.1-3.4cm) LV SYSTOLIC FUNCTION BY 2D PLANIMETRY (MOD):                     Normal Ranges: EF-A4C View: 73.6 % (>=55%) EF-A2C View: 80.4 % EF-Biplane:  78.8 % LV DIASTOLIC FUNCTION:                     Normal Ranges: MV Peak E: 1.09 m/s (0.7-1.2 m/s) MV DT:     245 msec (150-240 msec) MITRAL VALVE:                 Normal Ranges: MV DT: 245 msec (150-240msec) AORTIC VALVE:                          Normal Ranges: AoV Vmax:      1.82 m/s  (<=1.7m/s) AoV Peak P.2 mmHg (<20mmHg) LVOT Max Philipp:  1.42 m/s  (<=1.1m/s) LVOT VTI:      18.80 cm LVOT Diameter: 2.00 cm   (1.8-2.4cm) AoV Area,Vmax: 2.45 cm2  (2.5-4.5cm2)  RIGHT VENTRICLE: TAPSE: 15.8 mm RV s'  0.18 m/s TRICUSPID VALVE/RVSP:                             Normal Ranges: Peak TR Velocity: 2.90 m/s RV Syst Pressure: 36.6 mmHg (< 30mmHg) PULMONIC VALVE:                      Normal Ranges: PV Max Philipp: 0.9 m/s  (0.6-0.9m/s) PV Max PG:  3.2 mmHg  [id number] Delvis Miller MD Electronically signed on 2024 at 5:46:15 PM  ** Final **     XR abdomen 1 view    Result Date: 2024  Interpreted By:  Jovany Nuñez, STUDY: XR ABDOMEN 1 VIEW;  2024 12:58 pm   INDICATION: Signs/Symptoms:post  ngt.   COMPARISON: One-view abdomen 05/21/2024 13 a.m.   ACCESSION NUMBER(S): [id number]   ORDERING CLINICIAN: SUN CHAMPAGNE   FINDINGS: One-view abdomen   An enteric tube is positioned within the region of the gastric fundus several cm below the expected gastroesophageal junction. There is a predominantly fluid-filled small bowel and colon pattern. There is less gastric distention with air.   Opacification of the left thorax and multiple air bronchograms are noted.       1.  The enteric tube is positioned within the gastric fundus 2. Advancement of the tube would be recommended for more optimal positioning 3. Predominantly fluid-filled small bowel and colon pattern 4. Opacification of the visualized left thorax and multiple air bronchograms within the left lung   MACRO: None   Signed by: Jovany Nuñez 5/21/2024 2:19 PM Dictation workstation:   [id number]    XR abdomen 1 view    Result Date: 5/21/2024  Interpreted By:  Jovany Nuñez, STUDY: XR ABDOMEN 1 VIEW;  5/21/2024 11:13 am   INDICATION: Signs/Symptoms:ABD DISTENTION.   COMPARISON: None.   ACCESSION NUMBER(S): [id number]   ORDERING CLINICIAN: SUN CHAMPAGNE   FINDINGS: The stomach is distended with air. There is a predominantly fluid-filled small bowel and colon pattern. Gas is demonstrated within the colon and distal ileum. The left ventricular pacemaker electrode appears in adequate position. The left ventricle appears mildly enlarged. Osseous and articular anatomy is normal for age.       1.  The stomach is moderately distended with air otherwise, nonspecific predominantly fluid-filled bowel pattern   MACRO: None   Signed by: Jovany Nuñez 5/21/2024 2:17 PM Dictation workstation:   [id number]    XR chest 1 view    Result Date: 5/21/2024  Interpreted By:  Jovany Nuñez,  and Guicho Castro STUDY: XR CHEST 1 VIEW;  5/21/2024 6:14 am   INDICATION: Signs/Symptoms:SOB.   COMPARISON: Chest radiograph 05/16/2024 CT chest 05/18/2024   ACCESSION NUMBER(S):  [id number]   ORDERING CLINICIAN: SUN CHAMPAGNE   FINDINGS: AP radiograph of the chest was provided.   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   CARDIOMEDIASTINAL SILHOUETTE: Cardiomediastinal silhouette is stable in size with complete obscuration of the left cardiomediastinal border.   LUNGS: Persistent near-complete opacification of the left hemithorax with interval reduced air bronchograms compared to prior. Persistent patchy right infrahilar airspace opacities are noted. Prominent perihilar and interstitial lung markings are noted on the right. No consolidation, effusion, or pneumothorax on the right.   ABDOMEN: No remarkable upper abdominal findings.   BONES: No acute osseous changes.       1. Virtually complete opacification of the left hemithorax with interval reduced air bronchograms. 2. Persistent patchy right infrahilar airspace opacities which may reflect aspiration changes and/or pneumonia in the appropriate clinical setting.   I personally reviewed the images/study and I agree with the findings as stated by Matthew Lindo MD. This study was interpreted at [place], [place].   MACRO: None   Signed by: Jovany Nuñez 5/21/2024 11:26 AM Dictation workstation:   [id number]    XR abdomen 1 view    Result Date: 5/20/2024  Interpreted By:  Sofya Ahumada, STUDY: XR ABDOMEN 1 VIEW;  5/19/2024 10:42 pm   INDICATION: Signs/Symptoms:abdominal distension.   COMPARISON: CT: 05/18/2024   ACCESSION NUMBER(S): [id number]   ORDERING CLINICIAN: SUN CHAMPAGNE   FINDINGS: Nonobstructive bowel gas pattern. Limited evaluation of pneumoperitoneum on supine imaging, however no gross evidence of free air is noted.   Whiteout throughout the left hemithorax.   Osseous structures demonstrate no acute bony changes.       1.  Nonspecific and nonobstructive gas pattern.   MACRO: None   Signed by:  Sofya Canelaamson 5/20/2024 8:47 AM Dictation workstation:   [id number]    CT chest abdomen pelvis w IV contrast    Result Date: 5/18/2024  Interpreted By:  Collins Conde, STUDY: CT CHEST ABDOMEN PELVIS W IV CONTRAST;  5/18/2024 8:38 am   INDICATION: Signs/Symptoms:abd pain, LLQ TTP.   COMPARISON: Chest CT scan 12/10/2020. Chest and abdomen CT scan dated 06/14/2023.   ACCESSION NUMBER(S): [id number]   ORDERING CLINICIAN: THEA MURRAY   TECHNIQUE: CT of the chest, abdomen, and pelvis was performed.  Contiguous axial images were obtained at 3 mm slice thickness through the chest, abdomen and pelvis. Coronal and sagittal reconstructions at 3 mm slice thickness were performed. 75 ml of contrast Omnipaque were administered intravenously without immediate complication.   FINDINGS: CHEST:   LUNG/PLEURA/LARGE AIRWAYS: Significant collapse of the left lung with heterogenous appearance, pleural thickening and small loculated pleural fluid measuring 7.8 cm consistent with the patient's known mesothelioma. Focus of left posterior chest wall extension (series 201, image 77 and image 89) at the level of the 9th 10th and 10th 11th rib spaces   Right lower lobe ill-defined patchy infiltrate measuring 2.3 x 1.4 cm. Trace right-sided pleural effusion with surrounding atelectasis.   Redemonstrated left hilar ill-defined enhancing fullness appears extending to the left pulmonary trunk possibly tumor thrombus and felt less likely bland thrombus.   VESSELS: The aforementioned left hilar ill-defined soft tissue fullness appears extending to the left pulmonary trunk with almost 50% occlusion. Mild coronary artery calcifications   HEART: The heart is normal in size.  Trace pericardial effusion. Right chest wall pacemaker noted.   MEDIASTINUM AND ANDREA: Multiple prominent mediastinal lymph nodes in the largest in the subcarinal region measuring 1.6 cm. The esophagus is normal.   CHEST WALL AND LOWER NECK: The soft tissues of the chest  wall demonstrate no gross abnormality. The visualized thyroid gland appears within normal limits.   ABDOMEN:   LIVER: Normal size. Normal contour. Stable hypoattenuating lesions likely benign in the largest segment 4A measuring 2 cm.   BILE DUCTS: The intrahepatic and extrahepatic ducts are not dilated.   GALLBLADDER: Cholelithiasis.   PANCREAS: The pancreas appears unremarkable without evidence of ductal dilatation or masses.   SPLEEN: The spleen is normal in size.   ADRENAL GLANDS: Bilateral adrenal glands appear normal.   KIDNEYS AND URETERS: The kidneys are normal in size and enhance symmetrically.  No hydroureteronephrosis. Nonobstructive left midpole 0.6 cm calculus.   PELVIS:   BLADDER: Underdistended with Forrest catheter in place.   REPRODUCTIVE ORGANS: No pelvic masses.   BOWEL: The stomach, small and large bowel are normal in appearance without wall thickening or obstruction. Proximal sigmoid diverticulosis with mild wall thickening could be related to episodes of underlying mild diverticulitis. No ascites. Pneumoperitoneum.     VESSELS: Mild vascular calcifications and atherosclerotic changes.   PERITONEUM/RETROPERITONEUM/LYMPH NODES: No enlarged intra abdominopelvic lymphadenopathy.   BONE AND SOFT TISSUE: Multilevel degenerative spine changes and facet joint disease. Trace soft tissue thickening at the level of the umbilicus.       CHEST: 1. Significant collapse of the left lung with heterogenous appearance, pleural thickening and small loculated pleural fluid measuring 7.8 cm consistent with the patient's known mesothelioma which have worsened from the prior CT scan dated 06/14/2023 and grossly unchanged from 12/10/2023 unenhanced CT scan allowing for differences in techniques. Focus of new left posterior chest wall invasion noted at the level of 9th 10th and 10th 11th rib spaces. 2. Right lower lobe ill-defined patchy infiltrate measuring 2.3 x 1.4 cm. Trace right-sided pleural effusion with  surrounding atelectasis. 3. Redemonstrated left hilar ill-defined enhancing fullness appears extending to the left pulmonary trunk likely tumor thrombus and felt less likely bland thrombus which has significantly worsened from 06/14/2023 CT scan. 4. Mildly enlarged multiple mediastinal lymph nodes in the largest in the subcarinal region measuring 1.6 cm, grossly unchanged from prior.   ABDOMEN-PELVIS: 1. Proximal sigmoid diverticulosis with mild wall thickening could be related to episodes of underlying mild uncomplicated diverticulitis 2. Other ancillary findings are unchanged.   The significant results of the study were relayed by me to and acknowledged by Janes Nichols NP on 05/18/2024 at 10:40 a.m. over the phone.   MACRO: None   Signed by: Collins Conde 5/18/2024 10:47 AM Dictation workstation:   [id number]    US renal complete    Result Date: 5/17/2024  Interpreted By:  Karel Lowry, STUDY: US RENAL COMPLETE;  5/17/2024 8:02 pm   INDICATION: Signs/Symptoms:Urinary retention?.   COMPARISON: CT abdomen pelvis 05/16/2024   ACCESSION NUMBER(S): [id number]   ORDERING CLINICIAN: KEMI AUGUSTINE   TECHNIQUE: Grayscale and color Doppler sonographic images of the kidneys.   FINDINGS:     RIGHT KIDNEY: The right kidney measures 11.3 cm in length. Diffuse renal cortical thinning. No hydronephrosis. No renal calculus. No perinephric fluid.   LEFT KIDNEY: The left kidney measures 11.2 cm in length. Diffuse renal cortical thinning. No hydronephrosis. There is a 1 cm nonobstructing left renal calculus. No perinephric fluid.   URINARY BLADDER: Urinary bladder is decompressed, limiting its evaluation, with Forrest catheter in place.       1. Diffuse bilateral renal cortical thinning in keeping with chronic renal disease. No hydronephrosis. 2. Nonobstructing 1 cm left renal calculus. 3. Decompressed urinary bladder due to Forrest catheter, limiting its evaluation.   MACRO: None.   Signed by: Karel Lowry 5/17/2024 10:07  PM Dictation workstation:   [id number]

## 2024-06-17 ENCOUNTER — APPOINTMENT (OUTPATIENT)
Dept: CARDIOLOGY | Facility: HOSPITAL | Age: 89
End: 2024-06-17
Payer: MEDICARE

## 2024-06-17 LAB
ANION GAP SERPL CALC-SCNC: 12 MMOL/L (ref 10–20)
ATRIAL RATE: 267 BPM
BASOPHILS # BLD AUTO: 0 X10*3/UL (ref 0–0.1)
BASOPHILS NFR BLD AUTO: 0 %
BUN SERPL-MCNC: 48 MG/DL (ref 6–23)
CALCIUM SERPL-MCNC: 8 MG/DL (ref 8.6–10.3)
CHLORIDE SERPL-SCNC: 110 MMOL/L (ref 98–107)
CO2 SERPL-SCNC: 24 MMOL/L (ref 21–32)
CREAT SERPL-MCNC: 3.31 MG/DL (ref 0.5–1.3)
EGFRCR SERPLBLD CKD-EPI 2021: 17 ML/MIN/1.73M*2
EOSINOPHIL # BLD AUTO: 0.19 X10*3/UL (ref 0–0.4)
EOSINOPHIL NFR BLD AUTO: 4.1 %
ERYTHROCYTE [DISTWIDTH] IN BLOOD BY AUTOMATED COUNT: 16.3 % (ref 11.5–14.5)
GLUCOSE SERPL-MCNC: 97 MG/DL (ref 74–99)
HCT VFR BLD AUTO: 25.1 % (ref 41–52)
HGB BLD-MCNC: 7.7 G/DL (ref 13.5–17.5)
HOLD SPECIMEN: NORMAL
IMM GRANULOCYTES # BLD AUTO: 0.12 X10*3/UL (ref 0–0.5)
IMM GRANULOCYTES NFR BLD AUTO: 2.6 % (ref 0–0.9)
LYMPHOCYTES # BLD AUTO: 0.45 X10*3/UL (ref 0.8–3)
LYMPHOCYTES NFR BLD AUTO: 9.6 %
MAGNESIUM SERPL-MCNC: 1.5 MG/DL (ref 1.6–2.4)
MCH RBC QN AUTO: 29.5 PG (ref 26–34)
MCHC RBC AUTO-ENTMCNC: 30.7 G/DL (ref 32–36)
MCV RBC AUTO: 96 FL (ref 80–100)
MONOCYTES # BLD AUTO: 0.74 X10*3/UL (ref 0.05–0.8)
MONOCYTES NFR BLD AUTO: 15.8 %
NEUTROPHILS # BLD AUTO: 3.19 X10*3/UL (ref 1.6–5.5)
NEUTROPHILS NFR BLD AUTO: 67.9 %
NRBC BLD-RTO: 0 /100 WBCS (ref 0–0)
P AXIS: -11 DEGREES
P OFFSET: 186 MS
P ONSET: 158 MS
PLATELET # BLD AUTO: 130 X10*3/UL (ref 150–450)
POTASSIUM SERPL-SCNC: 4.8 MMOL/L (ref 3.5–5.3)
Q ONSET: 228 MS
QRS COUNT: 11 BEATS
QRS DURATION: 80 MS
QT INTERVAL: 418 MS
QTC CALCULATION(BAZETT): 431 MS
QTC FREDERICIA: 427 MS
R AXIS: 46 DEGREES
RBC # BLD AUTO: 2.61 X10*6/UL (ref 4.5–5.9)
SODIUM SERPL-SCNC: 141 MMOL/L (ref 136–145)
T AXIS: 56 DEGREES
T OFFSET: 437 MS
VANCOMYCIN SERPL-MCNC: 14.8 UG/ML (ref 5–20)
VENTRICULAR RATE: 64 BPM
WBC # BLD AUTO: 4.7 X10*3/UL (ref 4.4–11.3)

## 2024-06-17 PROCEDURE — C9113 INJ PANTOPRAZOLE SODIUM, VIA: HCPCS | Performed by: INTERNAL MEDICINE

## 2024-06-17 PROCEDURE — 36415 COLL VENOUS BLD VENIPUNCTURE: CPT | Performed by: INTERNAL MEDICINE

## 2024-06-17 PROCEDURE — 85025 COMPLETE CBC W/AUTO DIFF WBC: CPT | Performed by: INTERNAL MEDICINE

## 2024-06-17 PROCEDURE — 2500000004 HC RX 250 GENERAL PHARMACY W/ HCPCS (ALT 636 FOR OP/ED): Performed by: INTERNAL MEDICINE

## 2024-06-17 PROCEDURE — 2500000001 HC RX 250 WO HCPCS SELF ADMINISTERED DRUGS (ALT 637 FOR MEDICARE OP): Performed by: INTERNAL MEDICINE

## 2024-06-17 PROCEDURE — 93005 ELECTROCARDIOGRAM TRACING: CPT

## 2024-06-17 PROCEDURE — 99233 SBSQ HOSP IP/OBS HIGH 50: CPT | Performed by: INTERNAL MEDICINE

## 2024-06-17 PROCEDURE — 2500000002 HC RX 250 W HCPCS SELF ADMINISTERED DRUGS (ALT 637 FOR MEDICARE OP, ALT 636 FOR OP/ED): Mod: MUE | Performed by: INTERNAL MEDICINE

## 2024-06-17 PROCEDURE — 93010 ELECTROCARDIOGRAM REPORT: CPT | Performed by: INTERNAL MEDICINE

## 2024-06-17 PROCEDURE — 1200000002 HC GENERAL ROOM WITH TELEMETRY DAILY

## 2024-06-17 PROCEDURE — 80048 BASIC METABOLIC PNL TOTAL CA: CPT | Performed by: INTERNAL MEDICINE

## 2024-06-17 PROCEDURE — 83735 ASSAY OF MAGNESIUM: CPT | Performed by: INTERNAL MEDICINE

## 2024-06-17 PROCEDURE — 80202 ASSAY OF VANCOMYCIN: CPT | Performed by: PHARMACIST

## 2024-06-17 PROCEDURE — 2500000004 HC RX 250 GENERAL PHARMACY W/ HCPCS (ALT 636 FOR OP/ED): Performed by: NURSE PRACTITIONER

## 2024-06-17 RX ORDER — FUROSEMIDE 10 MG/ML
40 INJECTION INTRAMUSCULAR; INTRAVENOUS ONCE
Status: COMPLETED | OUTPATIENT
Start: 2024-06-17 | End: 2024-06-17

## 2024-06-17 RX ORDER — PROMETHAZINE HYDROCHLORIDE AND DEXTROMETHORPHAN HYDROBROMIDE 6.25; 15 MG/5ML; MG/5ML
5 SYRUP ORAL EVERY 4 HOURS PRN
Status: DISCONTINUED | OUTPATIENT
Start: 2024-06-17 | End: 2024-06-19 | Stop reason: HOSPADM

## 2024-06-17 RX ORDER — IPRATROPIUM BROMIDE AND ALBUTEROL SULFATE 2.5; .5 MG/3ML; MG/3ML
3 SOLUTION RESPIRATORY (INHALATION)
COMMUNITY

## 2024-06-17 RX ORDER — MAGNESIUM SULFATE HEPTAHYDRATE 40 MG/ML
2 INJECTION, SOLUTION INTRAVENOUS ONCE
Status: COMPLETED | OUTPATIENT
Start: 2024-06-17 | End: 2024-06-17

## 2024-06-17 RX ORDER — METOPROLOL TARTRATE 25 MG/1
25 TABLET, FILM COATED ORAL ONCE
Status: COMPLETED | OUTPATIENT
Start: 2024-06-17 | End: 2024-06-17

## 2024-06-17 RX ORDER — ZINC SULFATE 50(220)MG
50 CAPSULE ORAL DAILY
COMMUNITY

## 2024-06-17 RX ORDER — LANOLIN ALCOHOL/MO/W.PET/CERES
400 CREAM (GRAM) TOPICAL ONCE
Status: COMPLETED | OUTPATIENT
Start: 2024-06-17 | End: 2024-06-17

## 2024-06-17 RX ORDER — ATORVASTATIN CALCIUM 40 MG/1
40 TABLET, FILM COATED ORAL DAILY
COMMUNITY
Start: 2024-06-15

## 2024-06-17 ASSESSMENT — PAIN SCALES - GENERAL
PAINLEVEL_OUTOF10: 0 - NO PAIN
PAINLEVEL_OUTOF10: 0 - NO PAIN

## 2024-06-17 ASSESSMENT — PAIN - FUNCTIONAL ASSESSMENT: PAIN_FUNCTIONAL_ASSESSMENT: 0-10

## 2024-06-17 NOTE — PROGRESS NOTES
Vancomycin Dosing by Pharmacy- FOLLOW UP    Vinicius Arriola is a 89 y.o. year old male who Pharmacy has been consulted for vancomycin dosing for cellulitis, skin and soft tissue. Based on the patient's indication and renal status this patient is being dosed based on a goal AUC of 400-600.     Renal function is currently stable.    Current vancomycin dose: 500 mg given every 24 hours    Estimated vancomycin AUC on current dose: 364 mg/L.hr     Visit Vitals  BP 99/69 (BP Location: Left arm, Patient Position: Lying)   Pulse 101   Temp 37 °C (98.6 °F) (Temporal)   Resp 20        Lab Results   Component Value Date    CREATININE 3.31 (H) 2024    CREATININE 3.22 (H) 2024    CREATININE 3.35 (H) 06/15/2024    CREATININE 3.50 (H) 2024        Patient weight is as follows:   Vitals:    24 1408   Weight: 118 kg (260 lb)       Cultures:  No results found for the encounter in last 14 days.       I/O last 3 completed shifts:  In: 840 (7.1 mL/kg) [P.O.:240; IV Piggyback:600]  Out: 2400 (20.4 mL/kg) [Urine:2400 (0.6 mL/kg/hr)]  Weight: 117.9 kg   I/O during current shift:  No intake/output data recorded.    Temp (24hrs), Av.7 °C (98.1 °F), Min:36.1 °C (97 °F), Max:37 °C (98.6 °F)      Assessment/Plan    Below goal AUC. Orders placed for new vancomcyin regimen of 1500 mg every 48 hours to begin at 1800 on 24.     This dosing regimen is predicted by [employer] to result in the following pharmacokinetic parameters:    Loading dose: N/A  Regimen: 1500 mg IV every 48 hours.  Start time: 18:00 on 2024  Exposure target: AUC24 (range)400-600 mg/L.hr   AUC24,ss: 538 mg/L.hr  Probability of AUC24 > 400: 94 %  Ctrough,ss: 16.7 mg/L  Probability of Ctrough,ss > 20: 25 %  Probability of nephrotoxicity (Lodise PRAFUL ): 12 %    The next level will be obtained on 24 at 0500. May be obtained sooner if clinically indicated.   Will continue to monitor renal function daily while on vancomycin and order serum  creatinine at least every 48 hours if not already ordered.  Follow for continued vancomycin needs, clinical response, and signs/symptoms of toxicity.       Xenia Maguire, [facility]

## 2024-06-17 NOTE — PROGRESS NOTES
Medical Group Progress Note  ASSESSMENT & PLAN:     Scrotal/abdominal wall cellulitis  ?scrotal abscess  -seems to be improving  -fluid collection not clear if hydrocele or abscess; no plans for surgical intervention for right now  -cont with iv antibiotics, seems to be improving  -follow cultures    Afib with RVR  -cont with current treatment, stable on dilt drip  -he is on lovenox currently; was not [facility] on any anticoagulation 2/2 anemia, GI bleeding  -final anticoagulation plan pending goals of care, hemoglobin trend while here    Anemia  -stable, likely multifactorial per last heme note including reduced bone marrow reserve, ongoing chronic and acute illnesses  -transfuse <7    Progressive mesothelioma  -with significant L lung collapse, pleural effusions, hilar mass, and possible tumor thrombus  -prolonged hospitalization [facility] on oncology service; currently no plans for further chemotherapy, limited by his functional reserve and multiple chronic conditions  -had discussion with wife and son at bedside today; pt reports his goals of care are to get home; will see how he progresses while here to determine if we try for SNF and more functional recovery or possibly hospice at home  -add codeine syrup for cough; unfortunately pharmacy does not stock promethazine syrup which has helped in the past for him    VTE Prophylaxis: Lovenox    6/17/24:  Resumed metoprolol yesterday, dilt weaned to 5mg/hr; still up and down, may increase metop and try to get off the dilt today  Lovenox stopped due to some melena; hgb is stable, no further evidence of bleeding  Give another dose of lasix 40mg IV, watch pressures  Cellulitis is improving, cont current antibiotics  D/w him his GOC today; asked if he wanted to go to rehab to get stronger; he tells me he just wants to go home. We discussed hospice and his understanding of hospice. He is interested in talking to his family more; will d/w family as well when  they visit later today    Addendum: d/w wife and pt at bedside; plan is to meet with hospice to see about home hospice    Deondre Wagner MD    SUBJECTIVE     Feeling better today. Still some pain in LE. Dyspnea improved. Intermittent cough. No chest pain. No fever.     OBJECTIVE:     Last Recorded Vitals:  Vitals:    06/17/24 0237 06/17/24 0418 06/17/24 0641 06/17/24 0813   BP: 96/54 107/56 99/69 104/52   BP Location: Left arm Left arm Left arm    Patient Position: Lying Lying Lying    Pulse: 69 99 101 (!) 111   Resp: 20 20 20    Temp: 36.6 °C (97.9 °F) 36.9 °C (98.4 °F) 37 °C (98.6 °F) 36.9 °C (98.4 °F)   TempSrc: Temporal Temporal Temporal    SpO2: 98% 98% 97% 96%   Weight:       Height:         Last I/O:  I/O last 3 completed shifts:  In: 840 (7.1 mL/kg) [P.O.:240; IV Piggyback:600]  Out: 2400 (20.4 mL/kg) [Urine:2400 (0.6 mL/kg/hr)]  Weight: 117.9 kg     Physical Exam  GEN: chronically ill appearing, obese  HEENT: NCAT, PERRLA, EOMI, moist mucous membranes  NECK: supple, no masses  CV: irregularly irregular, no m/r/g, no LE edema  LUNGS: diminished bilaterally, rhonchi,   ABD: soft, NT, ND, NBS  SKIN: L abdominal wall erythema, improving, mild edema  : L scrotal wall cellulitis, mild TTP  MSK; no gross deformities, normal joints; pitting edema bilaterally  NEURO: A+Ox3, no FND  PSYCH: appropriate mood, affect      Inpatient Medications:  cefepime, 1 g, intravenous, q12h  [Held by provider] enoxaparin, 1 mg/kg, subcutaneous, q24h  furosemide, 40 mg, intravenous, Once  magnesium oxide, 400 mg, oral, Once  metoprolol tartrate, 25 mg, oral, BID  metroNIDAZOLE, 500 mg, intravenous, q8h  pantoprazole, 40 mg, intravenous, BID  rosuvastatin, 10 mg, oral, Daily  tamsulosin, 0.4 mg, oral, Daily  [START ON 6/18/2024] vancomycin, 1,500 mg, intravenous, q48h    PRN Medications  PRN medications: acetaminophen **OR** acetaminophen **OR** acetaminophen, albuterol, benzonatate, codeine-guaifenesin, dextromethorphan,  melatonin, ondansetron **OR** ondansetron, polyethylene glycol, traMADol, vancomycin  Continuous Medications:  dilTIAZem, 5 mg/hr, Last Rate: 5 mg/hr (06/16/24 1503)      LABS AND IMAGING:     Labs:  Results from last 7 days   Lab Units 06/17/24  0446 06/16/24  1325 06/16/24  0646 06/15/24  0537   WBC AUTO x10*3/uL 4.7  --  4.8 4.8   RBC AUTO x10*6/uL 2.61*  --  2.57* 2.79*   HEMOGLOBIN g/dL 7.7* 7.7* 7.5* 8.2*   HEMATOCRIT % 25.1* 24.9* 24.3* 27.2*   MCV fL 96  --  95 98   MCH pg 29.5  --  29.2 29.4   MCHC g/dL 30.7*  --  30.9* 30.1*   RDW % 16.3*  --  16.0* 16.1*   PLATELETS AUTO x10*3/uL 130*  --  143* 142*     Results from last 7 days   Lab Units 06/17/24 0445 06/16/24  0646 06/15/24  0537 06/14/24  1523 06/11/24  0651   SODIUM mmol/L 141 140 139 140 136   POTASSIUM mmol/L 4.8 4.6 5.0 5.1 5.0   CHLORIDE mmol/L 110* 109* 107 107 105   CO2 mmol/L 24 23 23 26 20*   BUN mg/dL 48* 46* 48* 51* 59*   CREATININE mg/dL 3.31* 3.22* 3.35* 3.50* 3.58*   GLUCOSE mg/dL 97 104* 86 104* 86   PROTEIN TOTAL g/dL  --   --  5.8* 6.2* 5.7*   CALCIUM mg/dL 8.0* 7.8* 8.3* 8.5* 8.2*   BILIRUBIN TOTAL mg/dL  --   --  0.4 0.3 0.4   ALK PHOS U/L  --   --  59 60 57   AST U/L  --   --  28 31 34   ALT U/L  --   --  13 14 22     Results from last 7 days   Lab Units 06/17/24 0445 06/16/24  0646 06/11/24  0651   MAGNESIUM mg/dL 1.50* 1.56* 2.02         Imaging:  ECG 12 lead  Accelerated Junctional rhythm  Low voltage QRS  Nonspecific ST abnormality  Abnormal ECG  When compared with ECG of 30-MAY-2024 08:33,  Junctional rhythm has replaced Atrial flutter    See ED provider note for full interpretation and clinical correlation    Confirmed by Chavo Cid ([id number]) on 6/15/2024 11:11:58 AM

## 2024-06-17 NOTE — PROGRESS NOTES
Rounded with team, patient's wife Veronique had decided she would like to meet with hospice. PCP put in order for hospice referral. Spoke with Veronique, she really had no preference, provided list. Advised her we work with several different hospice agencies. Veronique would like [facility]. Alejandra CRYSTAL, LSW is sending over referral, will wait for their acceptance and meeting time, will continue to follow.

## 2024-06-17 NOTE — PROGRESS NOTES
Referral was sent to [facility] who confirms he was admitted from their snf and able to return when ready.  Pt. With medicare ins. Will not require ins. Precert.    Received order for hospice consult.  Per amy she has spoken with spouse who is requesting [facility] hospice with a plan of home with hospice.  Referral sent.

## 2024-06-17 NOTE — PROGRESS NOTES
Urology progress note for Monday, 6/17/2024,  Very drowsy today  Wife at the bedside  Examination shows significant pelvic thigh edema/anasarca no evidence of any localized tenderness  Serum creatinine 3.31 and stable  Hemoglobin 7.7 with normal WBC count of 4700  All cultures are negative so far  Continues on aggressive antibiotic coverage  Wife reports that they have decided to be discharged home with hospice care and arrangements are being made  Continue antibiotics, conservative and supportive management    Additional medical and historical objective data reviewed and listed below      Current Facility-Administered Medications:     acetaminophen (Tylenol) tablet 650 mg, 650 mg, oral, q4h PRN **OR** acetaminophen (Tylenol) oral liquid 650 mg, 650 mg, nasogastric tube, q4h PRN **OR** acetaminophen (Tylenol) suppository 650 mg, 650 mg, rectal, q4h PRN, Vipul Sotelo MD    albuterol 2.5 mg /3 mL (0.083 %) nebulizer solution 2.5 mg, 2.5 mg, nebulization, q6h PRN, Deondre Wagner MD    benzonatate (Tessalon) capsule 100 mg, 100 mg, oral, TID PRN, Vipul Sotelo MD, 100 mg at 06/17/24 1227    cefepime (Maxipime) in dextrose 5% 50 mL IV 1 g, 1 g, intravenous, q12h, Vipul Sotelo MD, 1 g at 06/17/24 0930    [Held by provider] dilTIAZem (Cardizem) 125 mg in dextrose 5% 125 mL (1 mg/mL) infusion (premix), 5 mg/hr, intravenous, Continuous, Deondre Wagner MD, Stopped at 06/17/24 1151    [Held by provider] enoxaparin (Lovenox) syringe 120 mg, 1 mg/kg, subcutaneous, q24h, Vipul Sotelo MD, 120 mg at 06/15/24 2041    magnesium sulfate IV 2 g, 2 g, intravenous, Once, Shantelle Liang, APRN-CNP, Last Rate: 25 mL/hr at 06/17/24 1253, Rate Verify at 06/17/24 1253    melatonin tablet 3 mg, 3 mg, oral, Nightly PRN, Vipul Sotelo MD, 3 mg at 06/16/24 0435    metoprolol tartrate (Lopressor) tablet 25 mg, 25 mg, oral, BID, Chavo Cid MD, 25 mg at 06/17/24 0801    metroNIDAZOLE (Flagyl) 500 mg in NaCl (iso-os) 100 mL, 500 mg,  intravenous, q8h, Deondre Wagner MD, Stopped at 06/17/24 0926    ondansetron (Zofran) tablet 4 mg, 4 mg, oral, q8h PRN **OR** ondansetron (Zofran) injection 4 mg, 4 mg, intravenous, q8h PRN, Vipul Sotelo MD    [DISCONTINUED] pantoprazole (ProtoNix) EC tablet 40 mg, 40 mg, oral, Daily before breakfast, 40 mg at 06/16/24 0435 **OR** pantoprazole (ProtoNix) injection 40 mg, 40 mg, intravenous, BID, Deondre Wagner MD, 40 mg at 06/17/24 0800    polyethylene glycol (Glycolax, Miralax) packet 17 g, 17 g, oral, Daily PRN, Vipul Sotelo MD    promethazine-DM (Phenergan-DM) 6.25-15 mg/5 mL syrup 5 mL, 5 mL, oral, q4h PRN, Deondre Wagner MD    rosuvastatin (Crestor) tablet 10 mg, 10 mg, oral, Daily, Deondre Wagner MD, 10 mg at 06/17/24 0801    tamsulosin (Flomax) 24 hr capsule 0.4 mg, 0.4 mg, oral, Daily, Deondre Wagner MD, 0.4 mg at 06/17/24 0801    traMADol (Ultram) tablet 50 mg, 50 mg, oral, q8h PRN, Deondre Wagner MD, 50 mg at 06/16/24 1035    [START ON 6/18/2024] vancomycin (Vancocin) 1,500 mg in dextrose 5%  mL, 1,500 mg, intravenous, q48h, Xenia Maguire [facility]    vancomycin (Vancocin) pharmacy to dose - pharmacy monitoring, , miscellaneous, Daily PRN, Kati Miller [facility]   Results for orders placed or performed during the hospital encounter of 06/14/24 (from the past 96 hour(s))   ECG 12 lead   Result Value Ref Range    Ventricular Rate 126 BPM    Atrial Rate 129 BPM    QRS Duration 78 ms    QT Interval 324 ms    QTC Calculation(Bazett) 469 ms    R Axis 81 degrees    T Axis 42 degrees    QRS Count 20 beats    Q Onset 229 ms    T Offset 391 ms    QTC Fredericia 415 ms   CBC and Auto Differential   Result Value Ref Range    WBC 4.3 (L) 4.4 - 11.3 x10*3/uL    nRBC 0.0 0.0 - 0.0 /100 WBCs    RBC 2.93 (L) 4.50 - 5.90 x10*6/uL    Hemoglobin 8.6 (L) 13.5 - 17.5 g/dL    Hematocrit 28.0 (L) 41.0 - 52.0 %    MCV 96 80 - 100 fL    MCH 29.4 26.0 - 34.0 pg    MCHC 30.7 (L) 32.0 - 36.0 g/dL    RDW 16.2  (H) 11.5 - 14.5 %    Platelets 159 150 - 450 x10*3/uL    Neutrophils % 73.8 40.0 - 80.0 %    Immature Granulocytes %, Automated 1.9 (H) 0.0 - 0.9 %    Lymphocytes % 7.0 13.0 - 44.0 %    Monocytes % 11.5 2.0 - 10.0 %    Eosinophils % 5.6 0.0 - 6.0 %    Basophils % 0.2 0.0 - 2.0 %    Neutrophils Absolute 3.14 1.60 - 5.50 x10*3/uL    Immature Granulocytes Absolute, Automated 0.08 0.00 - 0.50 x10*3/uL    Lymphocytes Absolute 0.30 (L) 0.80 - 3.00 x10*3/uL    Monocytes Absolute 0.49 0.05 - 0.80 x10*3/uL    Eosinophils Absolute 0.24 0.00 - 0.40 x10*3/uL    Basophils Absolute 0.01 0.00 - 0.10 x10*3/uL   Comprehensive metabolic panel   Result Value Ref Range    Glucose 104 (H) 74 - 99 mg/dL    Sodium 140 136 - 145 mmol/L    Potassium 5.1 3.5 - 5.3 mmol/L    Chloride 107 98 - 107 mmol/L    Bicarbonate 26 21 - 32 mmol/L    Anion Gap 12 10 - 20 mmol/L    Urea Nitrogen 51 (H) 6 - 23 mg/dL    Creatinine 3.50 (H) 0.50 - 1.30 mg/dL    eGFR 16 (L) >60 mL/min/1.73m*2    Calcium 8.5 (L) 8.6 - 10.3 mg/dL    Albumin 3.0 (L) 3.4 - 5.0 g/dL    Alkaline Phosphatase 60 33 - 136 U/L    Total Protein 6.2 (L) 6.4 - 8.2 g/dL    AST 31 9 - 39 U/L    Bilirubin, Total 0.3 0.0 - 1.2 mg/dL    ALT 14 10 - 52 U/L   Lipase   Result Value Ref Range    Lipase 26 9 - 82 U/L   Lactate   Result Value Ref Range    Lactate 1.3 0.4 - 2.0 mmol/L   C-Reactive Protein   Result Value Ref Range    C-Reactive Protein 10.32 (H) <1.00 mg/dL   Sedimentation Rate   Result Value Ref Range    Sedimentation Rate 38 (H) 0 - 20 mm/h   Blood Culture    Specimen: Peripheral Venipuncture; Blood culture   Result Value Ref Range    Blood Culture No growth at 2 days    Light Blue Top   Result Value Ref Range    Extra Tube Hold for add-ons.    Lavender Top   Result Value Ref Range    Extra Tube Hold for add-ons.    Blood Culture    Specimen: Peripheral Venipuncture; Blood culture   Result Value Ref Range    Blood Culture No growth at 2 days    SST TOP   Result Value Ref Range     Extra Tube Hold for add-ons.    B-Type Natriuretic Peptide   Result Value Ref Range     (H) 0 - 99 pg/mL   Vancomycin   Result Value Ref Range    Vancomycin 17.9 5.0 - 20.0 ug/mL   CBC   Result Value Ref Range    WBC 4.8 4.4 - 11.3 x10*3/uL    nRBC 0.0 0.0 - 0.0 /100 WBCs    RBC 2.79 (L) 4.50 - 5.90 x10*6/uL    Hemoglobin 8.2 (L) 13.5 - 17.5 g/dL    Hematocrit 27.2 (L) 41.0 - 52.0 %    MCV 98 80 - 100 fL    MCH 29.4 26.0 - 34.0 pg    MCHC 30.1 (L) 32.0 - 36.0 g/dL    RDW 16.1 (H) 11.5 - 14.5 %    Platelets 142 (L) 150 - 450 x10*3/uL   Comprehensive metabolic panel   Result Value Ref Range    Glucose 86 74 - 99 mg/dL    Sodium 139 136 - 145 mmol/L    Potassium 5.0 3.5 - 5.3 mmol/L    Chloride 107 98 - 107 mmol/L    Bicarbonate 23 21 - 32 mmol/L    Anion Gap 14 10 - 20 mmol/L    Urea Nitrogen 48 (H) 6 - 23 mg/dL    Creatinine 3.35 (H) 0.50 - 1.30 mg/dL    eGFR 17 (L) >60 mL/min/1.73m*2    Calcium 8.3 (L) 8.6 - 10.3 mg/dL    Albumin 2.9 (L) 3.4 - 5.0 g/dL    Alkaline Phosphatase 59 33 - 136 U/L    Total Protein 5.8 (L) 6.4 - 8.2 g/dL    AST 28 9 - 39 U/L    Bilirubin, Total 0.4 0.0 - 1.2 mg/dL    ALT 13 10 - 52 U/L   Vancomycin   Result Value Ref Range    Vancomycin 15.3 5.0 - 20.0 ug/mL   Basic Metabolic Panel   Result Value Ref Range    Glucose 104 (H) 74 - 99 mg/dL    Sodium 140 136 - 145 mmol/L    Potassium 4.6 3.5 - 5.3 mmol/L    Chloride 109 (H) 98 - 107 mmol/L    Bicarbonate 23 21 - 32 mmol/L    Anion Gap 13 10 - 20 mmol/L    Urea Nitrogen 46 (H) 6 - 23 mg/dL    Creatinine 3.22 (H) 0.50 - 1.30 mg/dL    eGFR 18 (L) >60 mL/min/1.73m*2    Calcium 7.8 (L) 8.6 - 10.3 mg/dL   CBC and Auto Differential   Result Value Ref Range    WBC 4.8 4.4 - 11.3 x10*3/uL    nRBC 0.0 0.0 - 0.0 /100 WBCs    RBC 2.57 (L) 4.50 - 5.90 x10*6/uL    Hemoglobin 7.5 (L) 13.5 - 17.5 g/dL    Hematocrit 24.3 (L) 41.0 - 52.0 %    MCV 95 80 - 100 fL    MCH 29.2 26.0 - 34.0 pg    MCHC 30.9 (L) 32.0 - 36.0 g/dL    RDW 16.0 (H) 11.5 - 14.5 %     Platelets 143 (L) 150 - 450 x10*3/uL    Neutrophils % 72.4 40.0 - 80.0 %    Immature Granulocytes %, Automated 1.9 (H) 0.0 - 0.9 %    Lymphocytes % 7.8 13.0 - 44.0 %    Monocytes % 13.5 2.0 - 10.0 %    Eosinophils % 4.4 0.0 - 6.0 %    Basophils % 0.0 0.0 - 2.0 %    Neutrophils Absolute 3.44 1.60 - 5.50 x10*3/uL    Immature Granulocytes Absolute, Automated 0.09 0.00 - 0.50 x10*3/uL    Lymphocytes Absolute 0.37 (L) 0.80 - 3.00 x10*3/uL    Monocytes Absolute 0.64 0.05 - 0.80 x10*3/uL    Eosinophils Absolute 0.21 0.00 - 0.40 x10*3/uL    Basophils Absolute 0.00 0.00 - 0.10 x10*3/uL   Magnesium   Result Value Ref Range    Magnesium 1.56 (L) 1.60 - 2.40 mg/dL   SST TOP   Result Value Ref Range    Extra Tube Hold for add-ons.    Hemoglobin and hematocrit, blood   Result Value Ref Range    Hemoglobin 7.7 (L) 13.5 - 17.5 g/dL    Hematocrit 24.9 (L) 41.0 - 52.0 %   Vancomycin   Result Value Ref Range    Vancomycin 14.8 5.0 - 20.0 ug/mL   Basic Metabolic Panel   Result Value Ref Range    Glucose 97 74 - 99 mg/dL    Sodium 141 136 - 145 mmol/L    Potassium 4.8 3.5 - 5.3 mmol/L    Chloride 110 (H) 98 - 107 mmol/L    Bicarbonate 24 21 - 32 mmol/L    Anion Gap 12 10 - 20 mmol/L    Urea Nitrogen 48 (H) 6 - 23 mg/dL    Creatinine 3.31 (H) 0.50 - 1.30 mg/dL    eGFR 17 (L) >60 mL/min/1.73m*2    Calcium 8.0 (L) 8.6 - 10.3 mg/dL   Magnesium   Result Value Ref Range    Magnesium 1.50 (L) 1.60 - 2.40 mg/dL   SST TOP   Result Value Ref Range    Extra Tube Hold for add-ons.    CBC and Auto Differential   Result Value Ref Range    WBC 4.7 4.4 - 11.3 x10*3/uL    nRBC 0.0 0.0 - 0.0 /100 WBCs    RBC 2.61 (L) 4.50 - 5.90 x10*6/uL    Hemoglobin 7.7 (L) 13.5 - 17.5 g/dL    Hematocrit 25.1 (L) 41.0 - 52.0 %    MCV 96 80 - 100 fL    MCH 29.5 26.0 - 34.0 pg    MCHC 30.7 (L) 32.0 - 36.0 g/dL    RDW 16.3 (H) 11.5 - 14.5 %    Platelets 130 (L) 150 - 450 x10*3/uL    Neutrophils % 67.9 40.0 - 80.0 %    Immature Granulocytes %, Automated 2.6 (H) 0.0 -  0.9 %    Lymphocytes % 9.6 13.0 - 44.0 %    Monocytes % 15.8 2.0 - 10.0 %    Eosinophils % 4.1 0.0 - 6.0 %    Basophils % 0.0 0.0 - 2.0 %    Neutrophils Absolute 3.19 1.60 - 5.50 x10*3/uL    Immature Granulocytes Absolute, Automated 0.12 0.00 - 0.50 x10*3/uL    Lymphocytes Absolute 0.45 (L) 0.80 - 3.00 x10*3/uL    Monocytes Absolute 0.74 0.05 - 0.80 x10*3/uL    Eosinophils Absolute 0.19 0.00 - 0.40 x10*3/uL    Basophils Absolute 0.00 0.00 - 0.10 x10*3/uL     ECG 12 lead    Result Date: 6/15/2024  Accelerated Junctional rhythm Low voltage QRS Nonspecific ST abnormality Abnormal ECG When compared with ECG of 30-MAY-2024 08:33, Junctional rhythm has replaced Atrial flutter See ED provider note for full interpretation and clinical correlation Confirmed by Chavo Cid ([id number]) on 6/15/2024 11:11:58 AM    US scrotum w doppler    Result Date: 6/14/2024  Interpreted By:  Julieta Callahan, STUDY: US SCROTUM WITH DOPPLER; 6/14/2024 4:37 pm   INDICATION: Swelling, pain, and erythema.   COMPARISON: None.   ACCESSION NUMBER(S): [id number]   ORDERING CLINICIAN: QUE GUZMAN   TECHNIQUE: Gray scale and color doppler imaging of the scrotum was performed with spectral waveform analysis. Arterial inflow and venous outflow documented. Duplex Doppler interrogation including color flow and spectral waveform analysis is performed.   FINDINGS: Right testis: 3.4 x 2.0 x 3.2 cm.   Left testis: 4.9 x 2.9 x 3.0 cm.   Epididymides: The epididymides are normal in size and echogenicity. There is a 2 x 3 x 3 mm cyst within right epididymal head.   The testes appear homogeneous with no intratesticular lesions. Ectasia of the rete testes on the right is present. Duplex Doppler interrogation of each testicle including color flow and spectral waveform analysis shows normal arterial and venous flow without torsion or orchitis.   There is a 3.6 x 6.0 x 3.0 encapsulated fluid collection anterior and superior to the right testicle containing low-level  internal echoes and debris. This does not arise from the right epididymis. This may represent a scrotal wall abscess. There appears to be diffuse thickening of the scrotal wall.       3.0 x 3.6 x 6.0 cm encapsulated fluid collection noted which is extratesticular in location and is seen anterior and superior to the right testicle. This contains low-level internal echoes and some thickening of its wall with internal debris. This may represent a scrotal abscess. Clinical correlation is necessary.   Ectasia of the rete testes on the right.   3 mm cyst right epididymal head.   No epididymal orchitis.   MACRO: None.   Signed by: Julieta Callahan 6/14/2024 4:59 PM Dictation workstation:   [id number]    XR chest 2 views    Result Date: 6/14/2024  Interpreted By:  Josh Soto, STUDY: XR CHEST 2 VIEWS;  6/14/2024 3:10 pm   INDICATION: Signs/Symptoms:Cough.   COMPARISON: 05/28/2024   ACCESSION NUMBER(S): [id number]   ORDERING CLINICIAN: WES ORTIZ   FINDINGS: Pacemaker leads are intact and properly positioned.       CARDIOMEDIASTINAL SILHOUETTE: Cardiomediastinal silhouette is normal in size and configuration.   LUNGS: There is complete opacification left hemithorax with volume loss and shift of the mediastinum to the left.   Since the prior examination further increase in density of the right lung base which may suggest atelectasis or infiltrate. Correlation with auscultation and inflammatory markers recommended. A small to moderate on right pleural effusion persists.   ABDOMEN: No remarkable upper abdominal findings.   BONES: No acute osseous changes.       1.  Complete opacification with volume loss left hemithorax. Increased density right lung base reflecting small to moderate right pleural effusion. Superimposed pneumonia can not be excluded.       MACRO: None   Signed by: Josh Soto 6/14/2024 3:36 PM Dictation workstation:   [id number]    CT abdomen pelvis wo IV contrast    Result Date:  6/14/2024  Interpreted By:  Schoenberger, Joseph, STUDY: CT ABDOMEN PELVIS WO IV CONTRAST;  6/14/2024 2:55 pm   INDICATION: Signs/Symptoms:Scrotal swelling, erythema, erythema extending up into the abdomen.   COMPARISON: 05/22/2024   ACCESSION NUMBER(S): [id number]   ORDERING CLINICIAN: WES ORTIZ   TECHNIQUE: CT of the abdomen and pelvis was performed. Contiguous axial images were obtained at 3 mm slice thickness through the abdomen and pelvis. Coronal and sagittal reconstructions at 3 mm slice thickness were performed.  No intravenous or oral contrast agents were administered.   FINDINGS: Please note that the evaluation of vessels, lymph nodes and organs is limited without intravenous contrast.   LOWER CHEST: There is some persistent volume loss in the left lower lobe with persistent consolidation. There are few air bronchograms. There is rounded collection of fluid at the anterior aspect of this process which may relate to either necrotizing pneumonia or loculated pleural fluid or conceivably pulmonary abscess. However this is also unchanged from the prior. There is a however, a new moderate dependent layering right pleural effusion.   ABDOMEN:   LIVER: 2 cysts located in the superior aspect the left liver lobe are stable from the prior. No other focal abnormality.   BILE DUCTS: No dilation   GALLBLADDER: Dependent layering calcified gallstones. No wall thickening or pericholecystic fluid.   PANCREAS: Unremarkable   SPLEEN: Unremarkable   ADRENAL GLANDS: Bilateral adrenal glands appear normal.   KIDNEYS AND URETERS: The kidneys are normal in size and unremarkable in appearance.  No hydroureteronephrosis or nephroureterolithiasis is identified. 5 mm nonobstructing lower pole right renal stone unchanged.   PELVIS:   BLADDER: High choose 1   REPRODUCTIVE ORGANS: Unremarkable   BOWEL: The stomach is unremarkable.  Small bowel loops are normal in caliber without mural thickening. Colon is normal in caliber. There  is a mobile cecum. There are multiple colonic diverticula. There is no convincing evidence for acute diverticulitis. Normal appendix.   VESSELS: There is no aneurysmal dilatation of the abdominal aorta. The IVC appears normal.   PERITONEUM/RETROPERITONEUM/LYMPH NODES: There is no free or loculated fluid collection, no free intraperitoneal air. The retroperitoneum appears normal.  No abdominopelvic lymphadenopathy is present. The   ABDOMINAL WALL: There is soft tissues Amanda in the abdominal wall which is new from the prior. This is a set metric Roseann more pronounced on the left than on the right. This extends into the lower abdominal left-sided pannus. It does not appear to overtly involve the scrotum. There may be loculated right-sided hydrocele.   BONES: No suspicious osseous lesions are identified. 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       1.  The high significant development of abdominal wall edema in the subcutaneous tissues asymmetrically worse on the left. This does not appear to especially extend into the scrotum. 2. There is a new moderate right pleural effusion. 3. Persistent left basal consolidation with anterior oval shaped collection of fluid with similar appearance to prior other than lack of air bronchograms on this exam compared to the prior. Associated volume loss.     MACRO: None   Signed by: Joseph Schoenberger 6/14/2024 3:30 PM Dictation workstation:   [id number]    US renal complete    Result Date: 6/8/2024  Interpreted By:  Ryan Best and Weaver Jakob STUDY: US RENAL COMPLETE;  6/6/2024 5:51 pm   INDICATION: Signs/Symptoms:VIK.   COMPARISON: None.   ACCESSION NUMBER(S): [id number]   ORDERING CLINICIAN: ADRYAN SIMON   TECHNIQUE: Multiple images of the kidneys were obtained.   FINDINGS: RIGHT KIDNEY: The right kidney measures 11.8 cm in length. The renal cortical echogenicity is increased. Renal cortical thickness is mildly decreased.  No hydronephrosis is present; no evidence of nephrolithiasis.   LEFT KIDNEY: The left kidney is poorly visualized, measuring approximately 11.8 cm. Renal cortical echogenicity is increased. No apparent hydronephrosis or nephrolithiasis.   BLADDER: The urinary bladder is unremarkable in appearance. Poorly visualized small volume free fluid in the left upper quadrant.       Examination is somewhat limited due to patient body habitus, positioning. Within this limitation: Increased renal cortical echogenicity bilaterally which may relate to medical renal disease. No hydronephrosis or nephrolithiasis.   I personally reviewed the images/study and I agree with the findings as stated. This study was interpreted at [facility], [place], [place].   MACRO: None   Signed by: Ryan Urena 6/8/2024 3:02 AM Dictation workstation:   [id number]    ECG 12 Lead    Result Date: 5/31/2024  Atrial flutter with variable AV block with premature ventricular or aberrantly conducted complexes Low voltage QRS Abnormal ECG When compared with ECG of 28-MAY-2024 01:40, Atrial flutter has replaced Atrial fibrillation Nonspecific T wave abnormality, improved in Inferior leads Confirmed by Jovanny Loera ([id number]) on 5/31/2024 1:51:33 PM    Flexible Sigmoidoscopy    Result Date: 5/31/2024  Table formatting from the original result was not included. Impression Dark red blood visualized in the rectum. Blood was cleared without any active bleeding appreciated nor underlying mucosal changes. Few small and large, scattered diverticula with no inflammation in the sigmoid colon; no bleeding was identified. Diverticula were irrigated without any bleeding or stigmata of recent bleed appreciated. All observed locations appeared normal, including the sigmoid colon, rectosigmoid and rectum. No mucosal abnormalities. Normal on retroflexion. Green bilious stool proximal to the rectum, most notably in the proximal  descending colon and transverse colon, without any blood, blood clots or hematin. Findings Dark red blood visualized in the rectum. Blood was cleared without any active bleeding appreciated nor underlying mucosal changes. Few small and large, scattered diverticula with no inflammation in the sigmoid colon; no bleeding was identified. Diverticula were irrigated without any bleeding or stigmata of recent bleed appreciated. All observed locations appeared normal, including the sigmoid colon, rectosigmoid and rectum. No mucosal abnormalities. Normal on retroflexion. Green bilious stool proximal to the rectum, most notably in the proximal descending colon and transverse colon, without any blood, blood clots or hematin. Recommendation  Follow up with primary gastroenterologist  Follow up with inpatient GI consult service, Dr. Valenzuela and Dr. 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 The patient's tolerance of previous anesthesia has been reviewed. The risks and benefits of the procedure and the sedation options and risks were discussed with the patient. All questions were answered and informed consent obtained. Details of the Procedure The patient underwent no sedation. The patient's blood pressure, ECG, heart rate, level of consciousness, oxygen and respirations were monitored throughout the  procedure. A digital rectal exam was performed. A perianal exam was performed. The scope was introduced through the anus and advanced to the transverse colon. Retroflexion was performed in the rectum. The quality of bowel preparation was evaluated using the [place] Bowel Preparation Scale with scores of: left colon = 3. Bowel prep was not adequate. The patient experienced no blood loss. The procedure was not difficult. The patient tolerated the procedure well. There were no apparent adverse events. Events Procedure Events Event Event Time ENDO SCOPE IN TIME 5/24/2024  1:08 PM ENDO SCOPE OUT TIME 5/24/2024  1:26 PM Specimens No specimens collected Procedure Location [facility] [address] [facility] [id number] [phone] Referring Provider Grayson Nichols, APRN- [address] [address] Procedure Provider Ale Archibald MD     Lower extremity venous duplex left    Result Date: 5/31/2024  Interpreted By:  Nathan Argueta,  Mone Randolph STUDY: [facility] US LOWER EXTREMITY VENOUS DUPLEX LEFT;  5/30/2024 3:58 pm   INDICATION: Signs/Symptoms:LLE swelling, eval for DVT.   COMPARISON: None.   ACCESSION NUMBER(S): [id number]   ORDERING CLINICIAN: ALEXANDRA AVILA   TECHNIQUE: Vascular ultrasound of the left lower extremity was performed. Real-time compression views as well as Gray scale, color Doppler and spectral Doppler waveform analysis was performed.   FINDINGS: Evaluation of the visualized portions of the left common femoral vein, proximal, mid, and distal femoral vein, and popliteal vein were performed.  Evaluation of the visualized portions of the  posterior tibial and peroneal veins were also performed. Evaluation of the calf veins limited due to edema.   Echogenic intraluminal material in the proximal greater saphenous vein with partial compressibility and color Doppler signal The evaluated veins demonstrate normal  compressibility. There is intact venous flow demonstrating normal respiratory variability and normal augmentation of flow with calf compression. Therefore, there is no ultrasonographic evidence for deep vein thrombosis within the evaluated veins.       1.  No sonographic evidence for deep vein thrombosis within the evaluated veins of the left lower extremity. 2. Nonocclusive thrombus of the proximal greater saphenous vein, a superficial vein. Recommend correlation with concern for superficial thrombophlebitis.   I personally reviewed the images/study and I agree with the findings as stated by Agustín Cheatham MD. This study was interpreted at [facility], [place].   MACRO: None   Signed by: Nathan Argueta 5/31/2024 5:32 AM Dictation workstation:   [id number]    ECG 12 Lead    Result Date: 5/30/2024  Atrial flutter with variable AV block Rightward axis Low voltage QRS Nonspecific ST and T wave abnormality Abnormal ECG When compared with ECG of 16-MAY-2024 21:13, Significant changes have occurred Confirmed by Jovanny Loera ([id number]) on 5/30/2024 11:03:21 AM    US chest    Result Date: 5/29/2024  Interpreted By:  Dinh Plunkett, STUDY: US CHEST; 5/29/20243:53 pm   INDICATION: Signs/Symptoms:Thoracentesis (Left) diagnostic/therapeutic.   COMPARISON: None.   ACCESSION NUMBER(S): [id number]   ORDERING CLINICIAN: ALEXANDRA AVILA   TECHNIQUE: INTERVENTIONALIST(S): Dinh Plunkett   CONSENT: The patient/patient's POA/next of kin was informed of the nature of the proposed procedure. The purposes, alternatives, risks, and benefits were explained and discussed. All questions were answered and consent was obtained.   SEDATION: None   TIME OUT: A time out was performed immediately prior to procedure start with the interventional team, correctly identifying the patient name, date of birth, MRN, procedure, anatomy (including marking of site and side), patient position, procedure consent form,  relevant laboratory and imaging test results, antibiotic administration, safety precautions, and procedure-specific equipment needs.   FINDINGS: The patient was placed in the supine position.   The thoracic space was examined with grey scale ultrasound, and an absence of free fluid was demonstrated on ultrasound. Thoracic images were captured to demonstrate. A thoracentesis was not performed.       Absence of free fluid for procedure. A thoracentesis was not performed.   I personally performed and/or directly supervised this study and was present for the entire procedure.   Performed and dictated at [facility].   Signed by: Dinh Plunkett 5/29/2024 3:53 PM Dictation workstation:   [id number]    XR chest 1 view    Result Date: 5/28/2024  Interpreted By:  Jovany Nuñez, STUDY: XR CHEST 1 VIEW;  5/28/2024 8:03 am   INDICATION: Signs/Symptoms:Rule out infection.   COMPARISON: Chest radiograph dated 5/27/2024   ACCESSION NUMBER(S): [id number]   ORDERING CLINICIAN: ALEXANDRA AVILA   FINDINGS: AP radiograph of the chest   Pacemaker electrodes appear in adequate position. There is virtually complete opacification of the left thorax. The heart mediastinum are shifted to the left indicating volume loss. Compensatory hyperaeration of the right lung and pulmonary vascular shunting to the right lung is noted increased from previous study.         1. Virtually complete opacification of the left thorax with heart and mediastinum shifted to the left indicating volume loss 2. Compensatory pulmonary vascular shunting to the right lung       Signed by: Jovany Nuñez 5/28/2024 10:38 AM Dictation workstation:   [id number]    Electrocardiogram, 12-lead PRN ACS symptoms    Result Date: 5/28/2024  Atrial fibrillation with rapid ventricular response Nonspecific ST and T wave abnormality , probably digitalis effect Abnormal ECG When compared with ECG of 28-MAY-2024 01:39, (unconfirmed) No significant  change was found Confirmed by Jovanny Loera ([id number]) on 5/28/2024 10:17:48 AM    FL modified barium swallow study    Result Date: 5/26/2024  Interpreted By:  Sohail Torres and Riley Laura STUDY: FL MODIFIED BARIUM SWALLOW STUDY;; 5/25/2024 9:06 am   INDICATION: Signs/Symptoms:r\o dysphagia.   COMPARISON: None.   ACCESSION NUMBER(S): [id number]   ORDERING CLINICIAN: ALEAXNDRA AVILA   TECHNIQUE: MBSS completed. Informed verbal consent obtained prior to completion of exam. Trials of thin, nectar thick,  puree, and regular solids given. Fluoroscopy time :  2 minutes.   SLP: Meri Hurley MS CCC/SLP Phone/Pager: Epic Chat   SPEECH FINDINGS: Reason for referral: Further assessment of oropharyngeal swallow Patient hx: Recent RLL PNA. S/s of aspiration w/3 oz Swallow Protocol Respiratory status: Nasal cannula Previous diet: Reg/thin   FINAL SPEECH RECOMMENDATIONS   Diet recommendations/feeding strategies: Solid Diet Recommendations : Easy to Chew Liquid Diet Recommendations: Thin Medication Administration: Single w/ sips of water, whole in puree as needed   Safe Swallow Strategies/Guidelines: Only present PO intake when awake and alert Supervision/assist w/ PO intake to assure adherence to safe swallow strategies Upright positioning Slow rate/small boluses   Follow-up speech therapy recommended: Yes.   Short term goals: Pt will tolerate least restrictive diet with adequate oral phase and no s/s of aspiration. Date initiated: 5/25/24 Status: Goal initiated   Pt will adhere to safe swallow strategies during PO intake with > 90% accuracy independently. Date initiated: 5/25/24 Status: Goal initiated   Education provided: Yes.     Mechanics of the swallow summary: *Oral phase: Mild deficits.   Adequate bolus acceptance.  Prolonged mastication and bolus formation/transit w/ regular solids.  No oral residue.   *Pharyngeal phase: Mild deficits.   Incomplete epiglottic inversion, may be related to presence of NGT. Timely swallow  initiation.  Adequate hyolaryngeal elevation/excursion.  Trace penetration x1 with consecutive boluses of thin liquids related to incomplete airway protection.  Ejected upon swallow completion.  No other penetration and no aspiration with any other consistency assessed.  Mild residue at the valleculae following the swallow.  Largely cleared with liquid wash.  Mildly reduced distention of PES.     SLP impressions with severity rating: Mild oropharyngeal dysphagia characterized by prolonged mastication/bolus formation and mild residue at the valleculae following the swallow.   Thin Liquids (MBSS) Rosenbek's Penetration Aspiration Scale, Thin Liquids (MBSS): 2. PENETRATION that CLEARS - contrast enter airway, above vocal cords, no residue     Nectar Thick Liquids (MBSS) Rosenbek's Penetration Aspiration Scale, Nectar thick liquids (MBSS): 1. NO ASPIRATION & NO PENETRATION - no aspiration, contrast does not enter airway       Purees (MBSS) Rosenbek's Penetration Aspiration Scale, Purees (MBSS): 1. NO ASPIRATION & NO PENETRATION - no aspiration, contrast does not enter airway     Solids (MBSS) Rosenbek's Penetration Aspiration Scale, Solids (MBSS): 1. NO ASPIRATION & NO PENETRATION - no aspiration, contrast does not enter airway     Speech Therapy section of this report signed by Meri Hurley MS CCC/SLP on 5/25/2024 at 10:17 am.   RADIOLOGY FINDINGS: Nasoenteric tube is partially visualized. No evidence of acute osseous abnormality of the cervical spine. Patient is edentulous.   Radiology section of this report signed by Dr. Torres.       1. No radiographic evidence of acute process in the neck. 2. Please refer to speech pathology report for additional findings.   MACRO: None   Signed by: Sohail Torres 5/26/2024 3:51 PM Dictation workstation:   [id number]    XR abdomen 1 view    Result Date: 5/25/2024  Interpreted By:  Marcie Shah, STUDY: XR ABDOMEN 1 VIEW; 5/25/2024 5:45 pm   INDICATION:  Signs/Symptoms:ngt placed.   COMPARISON: Radiograph dated 05/25/2024, 2:33 p.m.   ACCESSION NUMBER(S): [id number]   ORDERING CLINICIAN: ALEXANDRA AVILA   FINDINGS: Single-view of the abdomen. The pelvis is not included. Enteric tube is in place with the tip projecting over the stomach. Positive contrast is identified in the gastric fundus. Prominent loops of bowel throughout the visualized upper abdomen. Nonobstructive bowel gas pattern.  Limited evaluation of pneumoperitoneum on supine imaging, however no gross evidence of free air is noted.   Extensive consolidation in visualized portion of the left lung.   Osseous structures demonstrate no acute bony changes.       1.  Enteric tube projecting over the proximal stomach. 2. Again seen multiple prominent loops of bowel throughout the upper abdomen. Correlation with ileus. Recommend follow-up radiograph.   Signed by: Marcie Rodriguez 5/25/2024 5:54 PM Dictation workstation:   [id number]    XR abdomen 1 view    Result Date: 5/25/2024  Interpreted By:  Marcie Shah, STUDY: XR ABDOMEN 1 VIEW; 5/25/2024 3:06 pm   INDICATION: Signs/Symptoms:distended abdomen, assess for dilated bowel loops.   COMPARISON: Radiograph dated 05/21/2024   ACCESSION NUMBER(S): [id number]   ORDERING CLINICIAN: ALEXANDRA AVILA   FINDINGS: Views of the abdomen and pelvis. What is presumed to be enteric tube is projecting over the lower mediastinum. Positive contrast is identified in the expected location of the stomach. There is also positive contrast throughout the loops of bowel in the lower abdomen. Prominent loops of colon. Limited evaluation of pneumoperitoneum on supine imaging, however no gross evidence of free air is noted.   Consolidative opacities in left lung.   Osseous structures demonstrate no acute bony changes.       1.  Prominent loops of colon which can be due to ileus. Recommend follow-up radiograph.   Signed by: Marcie Rodriguez 5/25/2024 5:33 PM Dictation  workstation:   [id number]    XR chest 1 view    Result Date: 5/23/2024  Interpreted By:  Jovany Nuñez and Baker Zachary STUDY: XR CHEST 1 VIEW; ;  5/22/2024 6:45 pm   INDICATION: Signs/Symptoms:Worsening tachypnea, confusion.   COMPARISON: Chest radiograph on 05/21/2024.   ACCESSION NUMBER(S): [id number]   ORDERING CLINICIAN: SUN CHAMPAGNE   FINDINGS: Single AP view of the chest.   Right subclavian vein approach pacemaker/AICD in place with leads projecting over the right atrium and ventricle. Enteric tube coursing below diaphragm tip overlying the expected position of the gastric body.   The cardiomediastinal silhouette is obscured, similar to prior exam.   Persistent near-complete opacification of the left hemithorax, with mild interval increase in air bronchograms within the left upper lung zone. Redemonstration of patchy right infrahilar airspace opacities and interstitial prominence on the right. No right-sided pleural effusion or pneumothorax.   No acute osseous abnormality.       1. Persistent near-complete opacification of the left hemithorax, with mild interval increase in air bronchograms within the left upper lung zone. 2. Persistent patchy right infrahilar airspace opacities and interstitial prominence throughout the right lung, which may represent aspiration/pneumonia in the appropriate clinical setting. 3. Medical devices as above.   I personally reviewed the images/study and I agree with the findings as stated by Dr. Raymond Armstrong M.D. This study was interpreted at [place], [place].   MACRO: None   Signed by: Jovany Nuñez 5/23/2024 7:32 AM Dictation workstation:   [id number]    CT chest abdomen pelvis wo IV contrast    Result Date: 5/23/2024  Interpreted By:  Sohail Torres  and Jeff Ibrahim STUDY: CT CHEST ABDOMEN PELVIS WO CONTRAST;  5/22/2024 12:15 pm   INDICATION: Signs/Symptoms:c/f sepsis.   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 N/V/D with [employer] concernign for UPI GIB and ongoing fevers.   COMPARISON: CT chest/abdomen/pelvis 05/18/2024   ACCESSION NUMBER(S): [id number]   ORDERING CLINICIAN: SUN CHAMPAGNE   TECHNIQUE: CT of the chest, abdomen and pelvis was performed. Contiguous axial images were obtained at 3 mm slice thickness through the chest, abdomen and pelvis. Coronal and sagittal reconstructions at 3 mm slice thickness were performed.  No intravenous contrast was administered; positive oral contrast was given.   FINDINGS: Please note that the study is limited without intravenous contrast.   Respiratory motion artifact.   CHEST:   LUNG/PLEURA/LARGE AIRWAYS: Trachea and bilateral mainstem bronchi are patent.   Again seen complete opacification of the left hemithorax with air bronchograms consistent with significant collapse of the left lung.   There is left pleural thickening (example series 2, image 38) with a focus of left posterior chest wall extension at the level of the 9th-10th (series 2, image 71) and 10th-11th (series 2, image 82) rib spaces, similar to prior, findings consistent with patient's known mesothelioma.   Similar small loculated pleural effusion measuring 7.1 x 4.1 x 2.3 cm (series 2, image 66 and series 900, image 62).   Interval improvement of right lower lobe ill-defined patchy infiltrate (series 4, image 143). Slight interval increase in right trace pleural effusion. No new focal consolidations. No evidence of pneumothorax.   VESSELS: The previously noted ill-defined hypodensity in the left pulmonary artery is not well evaluated on this exam due to beam hardening artifact, positioning of patient's arms, and lack of venous contrast.   Dilated main pulmonary artery measuring 3.8 cm (series 2, image 40), similar to prior. Ectatic ascending aorta measuring 4.1 cm, similar to prior.  Mild to  moderate coronary artery calcifications are present.   HEART: The heart is normal in size.  Trace pericardial fluid, similar to prior. Right-sided subclavian approach dual-chambercardiac ICD/pacemaker, with the leads in the right atrium and right ventricle.   MEDIASTINUM AND ANDREA: Multiple prominent mediastinal and perihilar lymph nodes similar to prior. For example:   1.3 cm subcarinal lymph node (series 2, image 48).   1 cm perihilar lymph node (series 2, image 33).   Enteric tube courses through the esophagus and terminates in the body of the stomach. Otherwise otherwise esophagus is within normal limits.   CHEST WALL AND LOWER NECK: Right chest wall cardiac pacemaker as described above. The visualized thyroid gland appears within normal limits.   ABDOMEN:   LIVER: The liver is normal in size. Similar hypoattenuating lesions with the largest measuring 2 cm segment 4A (series 2, image 71).   BILE DUCTS: The bile ducts are not dilated.   GALLBLADDER: The gallbladder is not distended. Calcified stones are noted.   PANCREAS: The pancreas appears unremarkable.   SPLEEN: Within normal limits.   ADRENAL GLANDS: Bilateral adrenal glands appear normal.   KIDNEYS AND URETERS: Bilateral mildly atrophic kidneys with mild perinephric stranding similar to prior. There is a 0.6 cm left midpole nonobstructing renal calculi. No hydroureteronephrosis. No right nephroureterolithiasis.   PELVIS:   BLADDER: The urinary bladder is underdistended with Forrest catheter in place. There is air in the urinary bladder likely from the Forrest.   REPRODUCTIVE ORGANS: No pelvic masses.   BOWEL: NG tube body of the stomach. Otherwise, the stomach is unremarkable. The small is normal in caliber and demonstrate no wall thickening. Proximal sigmoid diverticulosis with interval improvement in the mild wall thickening and adjacent fat stranding involving the proximal sigmoid colon (series 902, image 93). Interval insertion of rectal tube.   VESSELS:  Mild atherosclerosis of the abdominal aorta and its branching vessels. There is no aneurysmal dilatation of the abdominal aorta..   Interval flattening of the inferior vena cava (image 2, image 121)/   PERITONEUM/RETROPERITONEUM/LYMPH NODES: No ascites or free air, no fluid collection.  The retroperitoneum appears unremarkable.  No enlarged mesenteric lymph nodes.   ABDOMINAL WALL: Within normal limits.   BONES: No suspicious osseous lesions are present. Degenerative discogenic disease is noted in the lower thoracic and lumbar spine most severe at L3-L4, L4-L5, and L5-L6..       Chest 1. Interval improvement of the right lower lobe patchy infiltrate. 2. Again seen left lung collapse, left pleural thickening with invasion into the left posterior chest wall, and small left loculated pleural effusion consistent with patient's known mesothelioma and is unchanged when compared to prior CT from 05/18/2024. 3. Unchanged prominent mediastinal and perihilar lymph nodes. 4. Previously noted left pulmonary artery thrombus is not well evaluated on this exam due to beam hardening artifact, positioning of patient's arms, and lack of intravenous contrast.   Abdomen-Pelvis 1. Interval flattening of the inferior vena cava which may be seen with hypovolemia. Recommend correlation with patient's fluid volume status. 2. Improved proximal sigmoid diverticulitis. 3. Additional unchanged findings as noted above.   I personally reviewed the images/study and I agree with the findings as stated by Patrice Aguilar DO, PGY-2. This study was interpreted at [facility], [place], [place].   MACRO: None   Signed by: Sohail Torres 5/23/2024 12:22 AM Dictation workstation:   [id number]    Transthoracic Echo (TTE) Complete    Result Date: 5/21/2024   [facility], [address] [place] [address]                Tel [phone] and Fax [phone] TRANSTHORACIC ECHOCARDIOGRAM REPORT   Patient Name:      ZAKIYA LAWSON       Reading Physician:    [id number] Delvis Miller MD Study Date:        5/21/2024            Ordering Provider:    [id number] SUN CHAMPAGNE MRN/PID:           [id number]             Fellow: Accession#:        [id number]         Nurse: Date of Birth/Age: 1935 / 88 years Sonographer:          Dionisio Braga RDCS Gender:            M                    Additional Staff: Height:            172.72 cm            Admit Date: Weight:            109.77 kg            Admission Status:     Inpatient -                                                               Routine BSA / BMI:         2.22 m2 / 36.80      Encounter#:           [id number]                    kg/m2                                         Department Location:  [place] [address] Blood Pressure: 96 /57 mmHg Study Type:    TRANSTHORACIC ECHO (TTE) COMPLETE Diagnosis/ICD: Unspecified atrial fibrillation-I48.91; Hypertensive heart                disease with heart failure-I11.0 Indication:    hypoxic resp failure w/ new o2 requirement c/f HF CPT Code:      Echo Complete w Full Doppler-[id number] Patient History: Pertinent History: PAD, afib/flutter, hx DVT on warfarin, HTN, CAD s/p stent,                    mitral regurg, HLD,JOVANNI, basal cell ca on face, s/p removal                    and radiation of malignant mesothelioma, S/p L VATS with                    decort 5/2022. Study Detail: The following Echo studies were performed: 2D, M-Mode, color flow               and Doppler. Technically challenging study due to body habitus,               patient lying in supine position, poor acoustic windows and               prominent lung artifact. Definity used as a contrast agent for               endocardial border definition. Total contrast used for this                procedure was 2.0 mL via IV push. Unable to obtain suprasternal               notch view.  PHYSICIAN INTERPRETATION: Left Ventricle: The left ventricular systolic function is hyperdynamic, with an estimated ejection fraction of 70-75%. The patient is in atrial fibrillation which may influence the estimate of left ventricular function and transvalvular flows. There are no regional wall motion abnormalities. The left ventricular cavity size is normal. Left ventricular diastolic filling was not assessed. Left Atrium: The left atrium was not well visualized. Right Ventricle: The right ventricle was not well visualized. Unable to determine right ventricular systolic function. Right Atrium: The right atrium was not well visualized. Aortic Valve: The aortic valve is trileaflet. There is trivial aortic valve regurgitation. The peak instantaneous gradient of the aortic valve is 13.2 mmHg. Mitral Valve: The mitral valve is normal in structure. There is mild mitral annular calcification. There is trace mitral valve regurgitation. Tricuspid Valve: The tricuspid valve was not well visualized. Tricuspid regurgitation was not well visualized. Tricuspid regurgitation was not assessed. The Doppler estimated RVSP is mildly elevated at 36.6 mmHg. Pulmonic Valve: The pulmonic valve is not well visualized. There is physiologic pulmonic valve regurgitation. Pericardium: There is a trivial pericardial effusion. Aorta: The aortic root is abnormal. The aortic root appears moderately dilated and measures 4.50 cm. The Asc Ao is 3.70 cm. There is mild dilatation of the ascending aorta. Systemic Veins: The inferior vena cava appears to be of normal size. There is IVC inspiratory collapse greater than 50%. In comparison to the previous echocardiogram(s): Compared with study from 5/3/2022, the previous study was a MARY during a MARY/DCCV and comparison is limited.  CONCLUSIONS:  1. Poorly visualized anatomical structures due to suboptimal image  quality.  2. Left ventricular systolic function is hyperdynamic with a 70-75% estimated ejection fraction.  3. The patient is in atrial fibrillation which may influence the estimate of left ventricular function and transvalvular flows.  4. Mildly elevated RVSP.  5. Moderately dilated aortic root. QUANTITATIVE DATA SUMMARY: 2D MEASUREMENTS:                          Normal Ranges: Ao Root d:     4.50 cm   (2.0-3.7cm) LAs:           2.50 cm   (2.7-4.0cm) IVSd:          0.80 cm   (0.6-1.1cm) LVPWd:         0.80 cm   (0.6-1.1cm) LVIDd:         4.10 cm   (3.9-5.9cm) LVIDs:         2.20 cm LV Mass Index: 43.9 g/m2 LV % FS        46.3 % AORTA MEASUREMENTS:                    Normal Ranges: Asc Ao, d: 3.70 cm (2.1-3.4cm) LV SYSTOLIC FUNCTION BY 2D PLANIMETRY (MOD):                     Normal Ranges: EF-A4C View: 73.6 % (>=55%) EF-A2C View: 80.4 % EF-Biplane:  78.8 % LV DIASTOLIC FUNCTION:                     Normal Ranges: MV Peak E: 1.09 m/s (0.7-1.2 m/s) MV DT:     245 msec (150-240 msec) MITRAL VALVE:                 Normal Ranges: MV DT: 245 msec (150-240msec) AORTIC VALVE:                          Normal Ranges: AoV Vmax:      1.82 m/s  (<=1.7m/s) AoV Peak P.2 mmHg (<20mmHg) LVOT Max Philipp:  1.42 m/s  (<=1.1m/s) LVOT VTI:      18.80 cm LVOT Diameter: 2.00 cm   (1.8-2.4cm) AoV Area,Vmax: 2.45 cm2  (2.5-4.5cm2)  RIGHT VENTRICLE: TAPSE: 15.8 mm RV s'  0.18 m/s TRICUSPID VALVE/RVSP:                             Normal Ranges: Peak TR Velocity: 2.90 m/s RV Syst Pressure: 36.6 mmHg (< 30mmHg) PULMONIC VALVE:                      Normal Ranges: PV Max Philipp: 0.9 m/s  (0.6-0.9m/s) PV Max PG:  3.2 mmHg  [id number] Delvis Miller MD Electronically signed on 2024 at 5:46:15 PM  ** Final **     XR abdomen 1 view    Result Date: 2024  Interpreted By:  Jovany Nuñez, STUDY: XR ABDOMEN 1 VIEW;  2024 12:58 pm   INDICATION: Signs/Symptoms:post ngt.   COMPARISON: One-view abdomen 2024 13 a.m.   ACCESSION NUMBER(S):  [id number]   ORDERING CLINICIAN: SUN CHAMPAGNE   FINDINGS: One-view abdomen   An enteric tube is positioned within the region of the gastric fundus several cm below the expected gastroesophageal junction. There is a predominantly fluid-filled small bowel and colon pattern. There is less gastric distention with air.   Opacification of the left thorax and multiple air bronchograms are noted.       1.  The enteric tube is positioned within the gastric fundus 2. Advancement of the tube would be recommended for more optimal positioning 3. Predominantly fluid-filled small bowel and colon pattern 4. Opacification of the visualized left thorax and multiple air bronchograms within the left lung   MACRO: None   Signed by: Jovany Nuñez 5/21/2024 2:19 PM Dictation workstation:   [id number]    XR abdomen 1 view    Result Date: 5/21/2024  Interpreted By:  Jovany Nuñez, STUDY: XR ABDOMEN 1 VIEW;  5/21/2024 11:13 am   INDICATION: Signs/Symptoms:ABD DISTENTION.   COMPARISON: None.   ACCESSION NUMBER(S): [id number]   ORDERING CLINICIAN: SUN CHAMPAGNE   FINDINGS: The stomach is distended with air. There is a predominantly fluid-filled small bowel and colon pattern. Gas is demonstrated within the colon and distal ileum. The left ventricular pacemaker electrode appears in adequate position. The left ventricle appears mildly enlarged. Osseous and articular anatomy is normal for age.       1.  The stomach is moderately distended with air otherwise, nonspecific predominantly fluid-filled bowel pattern   MACRO: None   Signed by: Jovany Nuñez 5/21/2024 2:17 PM Dictation workstation:   [id number]    XR chest 1 view    Result Date: 5/21/2024  Interpreted By:  Jovany Nuñez,  and Guicho Castro STUDY: XR CHEST 1 VIEW;  5/21/2024 6:14 am   INDICATION: Signs/Symptoms:SOB.   COMPARISON: Chest radiograph 05/16/2024 CT chest 05/18/2024   ACCESSION NUMBER(S): [id number]   ORDERING CLINICIAN: SUN CHAMPAGNE   FINDINGS: AP radiograph of  the chest was provided.   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   CARDIOMEDIASTINAL SILHOUETTE: Cardiomediastinal silhouette is stable in size with complete obscuration of the left cardiomediastinal border.   LUNGS: Persistent near-complete opacification of the left hemithorax with interval reduced air bronchograms compared to prior. Persistent patchy right infrahilar airspace opacities are noted. Prominent perihilar and interstitial lung markings are noted on the right. No consolidation, effusion, or pneumothorax on the right.   ABDOMEN: No remarkable upper abdominal findings.   BONES: No acute osseous changes.       1. Virtually complete opacification of the left hemithorax with interval reduced air bronchograms. 2. Persistent patchy right infrahilar airspace opacities which may reflect aspiration changes and/or pneumonia in the appropriate clinical setting.   I personally reviewed the images/study and I agree with the findings as stated by Matthew Lindo MD. This study was interpreted at [place], [place].   MACRO: None   Signed by: Jovany Nuñez 5/21/2024 11:26 AM Dictation workstation:   [id number]    XR abdomen 1 view    Result Date: 5/20/2024  Interpreted By:  Sofya Ahumada, STUDY: XR ABDOMEN 1 VIEW;  5/19/2024 10:42 pm   INDICATION: Signs/Symptoms:abdominal distension.   COMPARISON: CT: 05/18/2024   ACCESSION NUMBER(S): [id number]   ORDERING CLINICIAN: SUN CHAMPAGNE   FINDINGS: Nonobstructive bowel gas pattern. Limited evaluation of pneumoperitoneum on supine imaging, however no gross evidence of free air is noted.   Whiteout throughout the left hemithorax.   Osseous structures demonstrate no acute bony changes.       1.  Nonspecific and nonobstructive gas pattern.   MACRO: None   Signed by: Sofya Ahumada 5/20/2024 8:47 AM Dictation workstation:   [id number]

## 2024-06-17 NOTE — PROGRESS NOTES
"Vinicius Arriola is a 89 y.o. male on day 3 of admission presenting with Cellulitis of scrotum.  6/17/2024 patient is significant comorbid conditions and mesothelioma advanced.  Patient and family agreed for hospice.  Subjective   Patient states still coughing and short of breath.     Objective   Patient appears acutely ill.  ROS  Review of Systems   Constitutional:  Positive for appetite change, fatigue and unexpected weight change.   HENT:  Positive for congestion.    Eyes: Negative.    Respiratory:  Positive for cough, shortness of breath and wheezing.    Cardiovascular:  Positive for palpitations and leg swelling.   Gastrointestinal:  Positive for abdominal distention and abdominal pain.   Endocrine: Negative.    Genitourinary: Negative.    Musculoskeletal:  Positive for arthralgias and myalgias.   Skin:  Positive for rash.   Allergic/Immunologic: Negative.    Neurological:  Positive for weakness.   Hematological: Negative.    Psychiatric/Behavioral:  Positive for sleep disturbance. The patient is nervous/anxious.    All other systems reviewed and are negative.     Vital Signs  Blood pressure 89/53, pulse 60, temperature 37 °C (98.6 °F), temperature source Temporal, resp. rate 20, height 1.727 m (5' 8\"), weight 118 kg (260 lb), SpO2 97%.    Physical Exam   Vitals reviewed.   Constitutional:       Appearance: He is obese. He is ill-appearing.   HENT:      Head: Normocephalic.      Nose: Congestion present.      Mouth/Throat:      Mouth: Mucous membranes are dry.   Eyes:      Conjunctiva/sclera: Conjunctivae normal.   Cardiovascular:      Rate and Rhythm: Tachycardia present.  A-fib RVR     Pulses: Normal pulses.      Heart sounds: Normal heart sounds.   Pulmonary:      Breath sounds: Wheezing and rales present.   Abdominal:      General: Bowel sounds are normal. There is distension.      Tenderness: There is ab A-fib RVR dominal tenderness. There is rebound.   Musculoskeletal:         General: Swelling and " tenderness present. Normal range of motion.      Cervical back: Normal range of motion.      Right lower leg: Edema present.      Left lower leg: Edema present.   Skin:     Capillary Refill: Capillary refill takes 2 to 3 seconds.      Findings: Erythema and rash present.   Neurological:      General: No focal deficit present.      Mental Status: He is alert and oriented to person, place, and time.   Psychiatric:         Mood and Affect: Mood normal.         Behavior: Behavior normal.         Thought Content: Thought content normal.         Judgment: Judgment normal.      Oxygen Therapy  SpO2: 97 %  Medical Gas Therapy: Supplemental oxygen  O2 Delivery Method: Nasal cannula       Intake/Output  I/O last 3 completed shifts:  In: 840 (7.1 mL/kg) [P.O.:240; IV Piggyback:600]  Out: 2400 (20.4 mL/kg) [Urine:2400 (0.6 mL/kg/hr)]  Weight: 117.9 kg     Lines and Tubes:  Peripheral IV 06/14/24 20 G Right Antecubital (Active)   Placement Date/Time: 06/14/24 2148   Hand Hygiene Completed: Yes  Size (Gauge): 20 G  Orientation: Right  Location: Antecubital  Site Prep: Alcohol;Chlorhexidine    Number of days: 2       Peripheral IV 06/17/24 18 G Left Antecubital (Active)   Placement Date/Time: 06/17/24 0951   Size (Gauge): 18 G  Orientation: Left  Location: Antecubital  Technique: Ultrasound guidance   Number of days: 0       External Urinary Catheter Male (Active)   Placement Date/Time: 06/14/24 2200   Hand Hygiene Completed: Yes  External Catheter Type: Male   Number of days: 2       Results for orders placed or performed during the hospital encounter of 06/14/24 (from the past 96 hour(s))   ECG 12 lead   Result Value Ref Range    Ventricular Rate 126 BPM    Atrial Rate 129 BPM    QRS Duration 78 ms    QT Interval 324 ms    QTC Calculation(Bazett) 469 ms    R Axis 81 degrees    T Axis 42 degrees    QRS Count 20 beats    Q Onset 229 ms    T Offset 391 ms    QTC Fredericia 415 ms   CBC and Auto Differential   Result Value Ref Range     WBC 4.3 (L) 4.4 - 11.3 x10*3/uL    nRBC 0.0 0.0 - 0.0 /100 WBCs    RBC 2.93 (L) 4.50 - 5.90 x10*6/uL    Hemoglobin 8.6 (L) 13.5 - 17.5 g/dL    Hematocrit 28.0 (L) 41.0 - 52.0 %    MCV 96 80 - 100 fL    MCH 29.4 26.0 - 34.0 pg    MCHC 30.7 (L) 32.0 - 36.0 g/dL    RDW 16.2 (H) 11.5 - 14.5 %    Platelets 159 150 - 450 x10*3/uL    Neutrophils % 73.8 40.0 - 80.0 %    Immature Granulocytes %, Automated 1.9 (H) 0.0 - 0.9 %    Lymphocytes % 7.0 13.0 - 44.0 %    Monocytes % 11.5 2.0 - 10.0 %    Eosinophils % 5.6 0.0 - 6.0 %    Basophils % 0.2 0.0 - 2.0 %    Neutrophils Absolute 3.14 1.60 - 5.50 x10*3/uL    Immature Granulocytes Absolute, Automated 0.08 0.00 - 0.50 x10*3/uL    Lymphocytes Absolute 0.30 (L) 0.80 - 3.00 x10*3/uL    Monocytes Absolute 0.49 0.05 - 0.80 x10*3/uL    Eosinophils Absolute 0.24 0.00 - 0.40 x10*3/uL    Basophils Absolute 0.01 0.00 - 0.10 x10*3/uL   Comprehensive metabolic panel   Result Value Ref Range    Glucose 104 (H) 74 - 99 mg/dL    Sodium 140 136 - 145 mmol/L    Potassium 5.1 3.5 - 5.3 mmol/L    Chloride 107 98 - 107 mmol/L    Bicarbonate 26 21 - 32 mmol/L    Anion Gap 12 10 - 20 mmol/L    Urea Nitrogen 51 (H) 6 - 23 mg/dL    Creatinine 3.50 (H) 0.50 - 1.30 mg/dL    eGFR 16 (L) >60 mL/min/1.73m*2    Calcium 8.5 (L) 8.6 - 10.3 mg/dL    Albumin 3.0 (L) 3.4 - 5.0 g/dL    Alkaline Phosphatase 60 33 - 136 U/L    Total Protein 6.2 (L) 6.4 - 8.2 g/dL    AST 31 9 - 39 U/L    Bilirubin, Total 0.3 0.0 - 1.2 mg/dL    ALT 14 10 - 52 U/L   Lipase   Result Value Ref Range    Lipase 26 9 - 82 U/L   Lactate   Result Value Ref Range    Lactate 1.3 0.4 - 2.0 mmol/L   C-Reactive Protein   Result Value Ref Range    C-Reactive Protein 10.32 (H) <1.00 mg/dL   Sedimentation Rate   Result Value Ref Range    Sedimentation Rate 38 (H) 0 - 20 mm/h   Blood Culture    Specimen: Peripheral Venipuncture; Blood culture   Result Value Ref Range    Blood Culture No growth at 2 days    Light Blue Top   Result Value Ref Range     Extra Tube Hold for add-ons.    Lavender Top   Result Value Ref Range    Extra Tube Hold for add-ons.    Blood Culture    Specimen: Peripheral Venipuncture; Blood culture   Result Value Ref Range    Blood Culture No growth at 2 days    SST TOP   Result Value Ref Range    Extra Tube Hold for add-ons.    B-Type Natriuretic Peptide   Result Value Ref Range     (H) 0 - 99 pg/mL   Vancomycin   Result Value Ref Range    Vancomycin 17.9 5.0 - 20.0 ug/mL   CBC   Result Value Ref Range    WBC 4.8 4.4 - 11.3 x10*3/uL    nRBC 0.0 0.0 - 0.0 /100 WBCs    RBC 2.79 (L) 4.50 - 5.90 x10*6/uL    Hemoglobin 8.2 (L) 13.5 - 17.5 g/dL    Hematocrit 27.2 (L) 41.0 - 52.0 %    MCV 98 80 - 100 fL    MCH 29.4 26.0 - 34.0 pg    MCHC 30.1 (L) 32.0 - 36.0 g/dL    RDW 16.1 (H) 11.5 - 14.5 %    Platelets 142 (L) 150 - 450 x10*3/uL   Comprehensive metabolic panel   Result Value Ref Range    Glucose 86 74 - 99 mg/dL    Sodium 139 136 - 145 mmol/L    Potassium 5.0 3.5 - 5.3 mmol/L    Chloride 107 98 - 107 mmol/L    Bicarbonate 23 21 - 32 mmol/L    Anion Gap 14 10 - 20 mmol/L    Urea Nitrogen 48 (H) 6 - 23 mg/dL    Creatinine 3.35 (H) 0.50 - 1.30 mg/dL    eGFR 17 (L) >60 mL/min/1.73m*2    Calcium 8.3 (L) 8.6 - 10.3 mg/dL    Albumin 2.9 (L) 3.4 - 5.0 g/dL    Alkaline Phosphatase 59 33 - 136 U/L    Total Protein 5.8 (L) 6.4 - 8.2 g/dL    AST 28 9 - 39 U/L    Bilirubin, Total 0.4 0.0 - 1.2 mg/dL    ALT 13 10 - 52 U/L   Vancomycin   Result Value Ref Range    Vancomycin 15.3 5.0 - 20.0 ug/mL   Basic Metabolic Panel   Result Value Ref Range    Glucose 104 (H) 74 - 99 mg/dL    Sodium 140 136 - 145 mmol/L    Potassium 4.6 3.5 - 5.3 mmol/L    Chloride 109 (H) 98 - 107 mmol/L    Bicarbonate 23 21 - 32 mmol/L    Anion Gap 13 10 - 20 mmol/L    Urea Nitrogen 46 (H) 6 - 23 mg/dL    Creatinine 3.22 (H) 0.50 - 1.30 mg/dL    eGFR 18 (L) >60 mL/min/1.73m*2    Calcium 7.8 (L) 8.6 - 10.3 mg/dL   CBC and Auto Differential   Result Value Ref Range    WBC 4.8 4.4 -  11.3 x10*3/uL    nRBC 0.0 0.0 - 0.0 /100 WBCs    RBC 2.57 (L) 4.50 - 5.90 x10*6/uL    Hemoglobin 7.5 (L) 13.5 - 17.5 g/dL    Hematocrit 24.3 (L) 41.0 - 52.0 %    MCV 95 80 - 100 fL    MCH 29.2 26.0 - 34.0 pg    MCHC 30.9 (L) 32.0 - 36.0 g/dL    RDW 16.0 (H) 11.5 - 14.5 %    Platelets 143 (L) 150 - 450 x10*3/uL    Neutrophils % 72.4 40.0 - 80.0 %    Immature Granulocytes %, Automated 1.9 (H) 0.0 - 0.9 %    Lymphocytes % 7.8 13.0 - 44.0 %    Monocytes % 13.5 2.0 - 10.0 %    Eosinophils % 4.4 0.0 - 6.0 %    Basophils % 0.0 0.0 - 2.0 %    Neutrophils Absolute 3.44 1.60 - 5.50 x10*3/uL    Immature Granulocytes Absolute, Automated 0.09 0.00 - 0.50 x10*3/uL    Lymphocytes Absolute 0.37 (L) 0.80 - 3.00 x10*3/uL    Monocytes Absolute 0.64 0.05 - 0.80 x10*3/uL    Eosinophils Absolute 0.21 0.00 - 0.40 x10*3/uL    Basophils Absolute 0.00 0.00 - 0.10 x10*3/uL   Magnesium   Result Value Ref Range    Magnesium 1.56 (L) 1.60 - 2.40 mg/dL   SST TOP   Result Value Ref Range    Extra Tube Hold for add-ons.    Hemoglobin and hematocrit, blood   Result Value Ref Range    Hemoglobin 7.7 (L) 13.5 - 17.5 g/dL    Hematocrit 24.9 (L) 41.0 - 52.0 %   Vancomycin   Result Value Ref Range    Vancomycin 14.8 5.0 - 20.0 ug/mL   Basic Metabolic Panel   Result Value Ref Range    Glucose 97 74 - 99 mg/dL    Sodium 141 136 - 145 mmol/L    Potassium 4.8 3.5 - 5.3 mmol/L    Chloride 110 (H) 98 - 107 mmol/L    Bicarbonate 24 21 - 32 mmol/L    Anion Gap 12 10 - 20 mmol/L    Urea Nitrogen 48 (H) 6 - 23 mg/dL    Creatinine 3.31 (H) 0.50 - 1.30 mg/dL    eGFR 17 (L) >60 mL/min/1.73m*2    Calcium 8.0 (L) 8.6 - 10.3 mg/dL   Magnesium   Result Value Ref Range    Magnesium 1.50 (L) 1.60 - 2.40 mg/dL   SST TOP   Result Value Ref Range    Extra Tube Hold for add-ons.    CBC and Auto Differential   Result Value Ref Range    WBC 4.7 4.4 - 11.3 x10*3/uL    nRBC 0.0 0.0 - 0.0 /100 WBCs    RBC 2.61 (L) 4.50 - 5.90 x10*6/uL    Hemoglobin 7.7 (L) 13.5 - 17.5 g/dL     Hematocrit 25.1 (L) 41.0 - 52.0 %    MCV 96 80 - 100 fL    MCH 29.5 26.0 - 34.0 pg    MCHC 30.7 (L) 32.0 - 36.0 g/dL    RDW 16.3 (H) 11.5 - 14.5 %    Platelets 130 (L) 150 - 450 x10*3/uL    Neutrophils % 67.9 40.0 - 80.0 %    Immature Granulocytes %, Automated 2.6 (H) 0.0 - 0.9 %    Lymphocytes % 9.6 13.0 - 44.0 %    Monocytes % 15.8 2.0 - 10.0 %    Eosinophils % 4.1 0.0 - 6.0 %    Basophils % 0.0 0.0 - 2.0 %    Neutrophils Absolute 3.19 1.60 - 5.50 x10*3/uL    Immature Granulocytes Absolute, Automated 0.12 0.00 - 0.50 x10*3/uL    Lymphocytes Absolute 0.45 (L) 0.80 - 3.00 x10*3/uL    Monocytes Absolute 0.74 0.05 - 0.80 x10*3/uL    Eosinophils Absolute 0.19 0.00 - 0.40 x10*3/uL    Basophils Absolute 0.00 0.00 - 0.10 x10*3/uL   ECG 12 Lead   Result Value Ref Range    Ventricular Rate 64 BPM    Atrial Rate 267 BPM    QRS Duration 80 ms    QT Interval 418 ms    QTC Calculation(Bazett) 431 ms    P Axis -11 degrees    R Axis 46 degrees    T Axis 56 degrees    QRS Count 11 beats    Q Onset 228 ms    P Onset 158 ms    P Offset 186 ms    T Offset 437 ms    QTC Fredericia 427 ms      Relevant Results  Recent Results (from the past 24 hour(s))   Vancomycin    Collection Time: 06/17/24  4:45 AM   Result Value Ref Range    Vancomycin 14.8 5.0 - 20.0 ug/mL   Basic Metabolic Panel    Collection Time: 06/17/24  4:45 AM   Result Value Ref Range    Glucose 97 74 - 99 mg/dL    Sodium 141 136 - 145 mmol/L    Potassium 4.8 3.5 - 5.3 mmol/L    Chloride 110 (H) 98 - 107 mmol/L    Bicarbonate 24 21 - 32 mmol/L    Anion Gap 12 10 - 20 mmol/L    Urea Nitrogen 48 (H) 6 - 23 mg/dL    Creatinine 3.31 (H) 0.50 - 1.30 mg/dL    eGFR 17 (L) >60 mL/min/1.73m*2    Calcium 8.0 (L) 8.6 - 10.3 mg/dL   Magnesium    Collection Time: 06/17/24  4:45 AM   Result Value Ref Range    Magnesium 1.50 (L) 1.60 - 2.40 mg/dL   SST TOP    Collection Time: 06/17/24  4:45 AM   Result Value Ref Range    Extra Tube Hold for add-ons.    CBC and Auto Differential     Collection Time: 06/17/24  4:46 AM   Result Value Ref Range    WBC 4.7 4.4 - 11.3 x10*3/uL    nRBC 0.0 0.0 - 0.0 /100 WBCs    RBC 2.61 (L) 4.50 - 5.90 x10*6/uL    Hemoglobin 7.7 (L) 13.5 - 17.5 g/dL    Hematocrit 25.1 (L) 41.0 - 52.0 %    MCV 96 80 - 100 fL    MCH 29.5 26.0 - 34.0 pg    MCHC 30.7 (L) 32.0 - 36.0 g/dL    RDW 16.3 (H) 11.5 - 14.5 %    Platelets 130 (L) 150 - 450 x10*3/uL    Neutrophils % 67.9 40.0 - 80.0 %    Immature Granulocytes %, Automated 2.6 (H) 0.0 - 0.9 %    Lymphocytes % 9.6 13.0 - 44.0 %    Monocytes % 15.8 2.0 - 10.0 %    Eosinophils % 4.1 0.0 - 6.0 %    Basophils % 0.0 0.0 - 2.0 %    Neutrophils Absolute 3.19 1.60 - 5.50 x10*3/uL    Immature Granulocytes Absolute, Automated 0.12 0.00 - 0.50 x10*3/uL    Lymphocytes Absolute 0.45 (L) 0.80 - 3.00 x10*3/uL    Monocytes Absolute 0.74 0.05 - 0.80 x10*3/uL    Eosinophils Absolute 0.19 0.00 - 0.40 x10*3/uL    Basophils Absolute 0.00 0.00 - 0.10 x10*3/uL   ECG 12 Lead    Collection Time: 06/17/24 10:50 AM   Result Value Ref Range    Ventricular Rate 64 BPM    Atrial Rate 267 BPM    QRS Duration 80 ms    QT Interval 418 ms    QTC Calculation(Bazett) 431 ms    P Axis -11 degrees    R Axis 46 degrees    T Axis 56 degrees    QRS Count 11 beats    Q Onset 228 ms    P Onset 158 ms    P Offset 186 ms    T Offset 437 ms    QTC Fredericia 427 ms      ECG 12 Lead    Result Date: 6/17/2024  Atrial flutter Low voltage QRS Nonspecific ST abnormality Abnormal ECG When compared with ECG of 14-JUN-2024 14:30, Atrial flutter has replaced Junctional rhythm Vent. rate has decreased BY  62 BPM Non-specific change in ST segment in Inferior leads    ECG 12 lead    Result Date: 6/15/2024  Accelerated Junctional rhythm Low voltage QRS Nonspecific ST abnormality Abnormal ECG When compared with ECG of 30-MAY-2024 08:33, Junctional rhythm has replaced Atrial flutter See ED provider note for full interpretation and clinical correlation Confirmed by Chavo Cid ([id number]) on  6/15/2024 11:11:58 AM    US scrotum w doppler    Result Date: 6/14/2024  Interpreted By:  Julieta Callahan, STUDY: US SCROTUM WITH DOPPLER; 6/14/2024 4:37 pm   INDICATION: Swelling, pain, and erythema.   COMPARISON: None.   ACCESSION NUMBER(S): [id number]   ORDERING CLINICIAN: QUE GUZMAN   TECHNIQUE: Gray scale and color doppler imaging of the scrotum was performed with spectral waveform analysis. Arterial inflow and venous outflow documented. Duplex Doppler interrogation including color flow and spectral waveform analysis is performed.   FINDINGS: Right testis: 3.4 x 2.0 x 3.2 cm.   Left testis: 4.9 x 2.9 x 3.0 cm.   Epididymides: The epididymides are normal in size and echogenicity. There is a 2 x 3 x 3 mm cyst within right epididymal head.   The testes appear homogeneous with no intratesticular lesions. Ectasia of the rete testes on the right is present. Duplex Doppler interrogation of each testicle including color flow and spectral waveform analysis shows normal arterial and venous flow without torsion or orchitis.   There is a 3.6 x 6.0 x 3.0 encapsulated fluid collection anterior and superior to the right testicle containing low-level internal echoes and debris. This does not arise from the right epididymis. This may represent a scrotal wall abscess. There appears to be diffuse thickening of the scrotal wall.       3.0 x 3.6 x 6.0 cm encapsulated fluid collection noted which is extratesticular in location and is seen anterior and superior to the right testicle. This contains low-level internal echoes and some thickening of its wall with internal debris. This may represent a scrotal abscess. Clinical correlation is necessary.   Ectasia of the rete testes on the right.   3 mm cyst right epididymal head.   No epididymal orchitis.   MACRO: None.   Signed by: Julieta Callahan 6/14/2024 4:59 PM Dictation workstation:   [id number]    XR chest 2 views    Result Date: 6/14/2024  Interpreted By:  Josh Soto, STUDY: XR  CHEST 2 VIEWS;  6/14/2024 3:10 pm   INDICATION: Signs/Symptoms:Cough.   COMPARISON: 05/28/2024   ACCESSION NUMBER(S): [id number]   ORDERING CLINICIAN: WES ORTIZ   FINDINGS: Pacemaker leads are intact and properly positioned.       CARDIOMEDIASTINAL SILHOUETTE: Cardiomediastinal silhouette is normal in size and configuration.   LUNGS: There is complete opacification left hemithorax with volume loss and shift of the mediastinum to the left.   Since the prior examination further increase in density of the right lung base which may suggest atelectasis or infiltrate. Correlation with auscultation and inflammatory markers recommended. A small to moderate on right pleural effusion persists.   ABDOMEN: No remarkable upper abdominal findings.   BONES: No acute osseous changes.       1.  Complete opacification with volume loss left hemithorax. Increased density right lung base reflecting small to moderate right pleural effusion. Superimposed pneumonia can not be excluded.       MACRO: None   Signed by: Josh Soto 6/14/2024 3:36 PM Dictation workstation:   [id number]    CT abdomen pelvis wo IV contrast    Result Date: 6/14/2024  Interpreted By:  Schoenberger, Joseph, STUDY: CT ABDOMEN PELVIS WO IV CONTRAST;  6/14/2024 2:55 pm   INDICATION: Signs/Symptoms:Scrotal swelling, erythema, erythema extending up into the abdomen.   COMPARISON: 05/22/2024   ACCESSION NUMBER(S): [id number]   ORDERING CLINICIAN: WES ORTIZ   TECHNIQUE: CT of the abdomen and pelvis was performed. Contiguous axial images were obtained at 3 mm slice thickness through the abdomen and pelvis. Coronal and sagittal reconstructions at 3 mm slice thickness were performed.  No intravenous or oral contrast agents were administered.   FINDINGS: Please note that the evaluation of vessels, lymph nodes and organs is limited without intravenous contrast.   LOWER CHEST: There is some persistent volume loss in the left lower lobe with persistent  consolidation. There are few air bronchograms. There is rounded collection of fluid at the anterior aspect of this process which may relate to either necrotizing pneumonia or loculated pleural fluid or conceivably pulmonary abscess. However this is also unchanged from the prior. There is a however, a new moderate dependent layering right pleural effusion.   ABDOMEN:   LIVER: 2 cysts located in the superior aspect the left liver lobe are stable from the prior. No other focal abnormality.   BILE DUCTS: No dilation   GALLBLADDER: Dependent layering calcified gallstones. No wall thickening or pericholecystic fluid.   PANCREAS: Unremarkable   SPLEEN: Unremarkable   ADRENAL GLANDS: Bilateral adrenal glands appear normal.   KIDNEYS AND URETERS: The kidneys are normal in size and unremarkable in appearance.  No hydroureteronephrosis or nephroureterolithiasis is identified. 5 mm nonobstructing lower pole right renal stone unchanged.   PELVIS:   BLADDER: High choose 1   REPRODUCTIVE ORGANS: Unremarkable   BOWEL: The stomach is unremarkable.  Small bowel loops are normal in caliber without mural thickening. Colon is normal in caliber. There is a mobile cecum. There are multiple colonic diverticula. There is no convincing evidence for acute diverticulitis. Normal appendix.   VESSELS: There is no aneurysmal dilatation of the abdominal aorta. The IVC appears normal.   PERITONEUM/RETROPERITONEUM/LYMPH NODES: There is no free or loculated fluid collection, no free intraperitoneal air. The retroperitoneum appears normal.  No abdominopelvic lymphadenopathy is present. The   ABDOMINAL WALL: There is soft tissues Amanda in the abdominal wall which is new from the prior. This is a set metric Roseann more pronounced on the left than on the right. This extends into the lower abdominal left-sided pannus. It does not appear to overtly involve the scrotum. There may be loculated right-sided hydrocele.   BONES: No suspicious osseous lesions are  identified. 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       1.  The high significant development of abdominal wall edema in the subcutaneous tissues asymmetrically worse on the left. This does not appear to especially extend into the scrotum. 2. There is a new moderate right pleural effusion. 3. Persistent left basal consolidation with anterior oval shaped collection of fluid with similar appearance to prior other than lack of air bronchograms on this exam compared to the prior. Associated volume loss.     MACRO: None   Signed by: Joseph Schoenberger 6/14/2024 3:30 PM Dictation workstation:   [id number]    US renal complete    Result Date: 6/8/2024  Interpreted By:  Ryan Best and Weaver Jakob STUDY: US RENAL COMPLETE;  6/6/2024 5:51 pm   INDICATION: Signs/Symptoms:VIK.   COMPARISON: None.   ACCESSION NUMBER(S): [id number]   ORDERING CLINICIAN: ADRYAN SIMON   TECHNIQUE: Multiple images of the kidneys were obtained.   FINDINGS: RIGHT KIDNEY: The right kidney measures 11.8 cm in length. The renal cortical echogenicity is increased. Renal cortical thickness is mildly decreased. No hydronephrosis is present; no evidence of nephrolithiasis.   LEFT KIDNEY: The left kidney is poorly visualized, measuring approximately 11.8 cm. Renal cortical echogenicity is increased. No apparent hydronephrosis or nephrolithiasis.   BLADDER: The urinary bladder is unremarkable in appearance. Poorly visualized small volume free fluid in the left upper quadrant.       Examination is somewhat limited due to patient body habitus, positioning. Within this limitation: Increased renal cortical echogenicity bilaterally which may relate to medical renal disease. No hydronephrosis or nephrolithiasis.   I personally reviewed the images/study and I agree with the findings as stated. This study was interpreted at [place], [place].   MACRO: None    Signed by: Ryan Urena 6/8/2024 3:02 AM Dictation workstation:   [id number]    ECG 12 Lead    Result Date: 5/31/2024  Atrial flutter with variable AV block with premature ventricular or aberrantly conducted complexes Low voltage QRS Abnormal ECG When compared with ECG of 28-MAY-2024 01:40, Atrial flutter has replaced Atrial fibrillation Nonspecific T wave abnormality, improved in Inferior leads Confirmed by Jovanny Loera ([id number]) on 5/31/2024 1:51:33 PM    Flexible Sigmoidoscopy    Result Date: 5/31/2024  Table formatting from the original result was not included. Impression Dark red blood visualized in the rectum. Blood was cleared without any active bleeding appreciated nor underlying mucosal changes. Few small and large, scattered diverticula with no inflammation in the sigmoid colon; no bleeding was identified. Diverticula were irrigated without any bleeding or stigmata of recent bleed appreciated. All observed locations appeared normal, including the sigmoid colon, rectosigmoid and rectum. No mucosal abnormalities. Normal on retroflexion. Green bilious stool proximal to the rectum, most notably in the proximal descending colon and transverse colon, without any blood, blood clots or hematin. Findings Dark red blood visualized in the rectum. Blood was cleared without any active bleeding appreciated nor underlying mucosal changes. Few small and large, scattered diverticula with no inflammation in the sigmoid colon; no bleeding was identified. Diverticula were irrigated without any bleeding or stigmata of recent bleed appreciated. All observed locations appeared normal, including the sigmoid colon, rectosigmoid and rectum. No mucosal abnormalities. Normal on retroflexion. Green bilious stool proximal to the rectum, most notably in the proximal descending colon and transverse colon, without any blood, blood clots or hematin. Recommendation  Follow up with primary gastroenterologist  Follow up with  inpatient GI consult service, Dr. Valenzuela and Dr. 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 The patient's tolerance of previous anesthesia has been reviewed. The risks and benefits of the procedure and the sedation options and risks were discussed with the patient. All questions were answered and informed consent obtained. Details of the Procedure The patient underwent no sedation. The patient's blood pressure, ECG, heart rate, level of consciousness, oxygen and respirations were monitored throughout the procedure. A digital rectal exam was performed. A perianal exam was performed. The scope was introduced through the anus and advanced to the transverse colon. Retroflexion was performed in the rectum. The quality of bowel preparation was evaluated using the [place] Bowel Preparation Scale with scores of: left colon = 3. Bowel prep was not adequate. The patient experienced no blood loss. The procedure was not difficult. The patient tolerated the procedure well. There were no apparent adverse events. Events Procedure Events Event Event Time ENDO SCOPE IN TIME 5/24/2024  1:08 PM ENDO SCOPE OUT TIME 5/24/2024  1:26 PM Specimens No specimens collected Procedure Location [facility] [address] [facility] [address]  [phone] Referring Provider Grayson Nichols, [place] [address] Procedure Provider Ale Archibald MD     Lower extremity venous duplex left    Result Date: 5/31/2024  Interpreted By:  Nathan Argueta and Weaver Jakob STUDY: [facility] US LOWER EXTREMITY VENOUS DUPLEX LEFT;  5/30/2024 3:58 pm   INDICATION: Signs/Symptoms:LLE swelling, eval for DVT.   COMPARISON: None.   ACCESSION NUMBER(S): [id number]   ORDERING CLINICIAN: ALEXANDRA AVILA   TECHNIQUE: Vascular ultrasound of the left lower extremity was performed. Real-time compression views as well as Gray scale, color Doppler and spectral Doppler waveform analysis was performed.   FINDINGS: Evaluation of the visualized portions of the left common femoral vein, proximal, mid, and distal femoral vein, and popliteal vein were performed.  Evaluation of the visualized portions of the  posterior tibial and peroneal veins were also performed. Evaluation of the calf veins limited due to edema.   Echogenic intraluminal material in the proximal greater saphenous vein with partial compressibility and color Doppler signal The evaluated veins demonstrate normal compressibility. There is intact venous flow demonstrating normal respiratory variability and normal augmentation of flow with calf compression. Therefore, there is no ultrasonographic evidence for deep vein thrombosis within the evaluated veins.       1.  No sonographic evidence for deep vein thrombosis within the evaluated veins of the left lower extremity. 2. Nonocclusive thrombus of the proximal greater saphenous vein, a superficial vein. Recommend correlation with concern for superficial thrombophlebitis.   I personally reviewed the images/study and I agree with the findings as stated by Agustín Cheatham MD. This study was interpreted at [facility], [place], [place].   MACRO: None   Signed by: Nathan Argueta 5/31/2024 5:32 AM Dictation workstation:    [id number]    ECG 12 Lead    Result Date: 5/30/2024  Atrial flutter with variable AV block Rightward axis Low voltage QRS Nonspecific ST and T wave abnormality Abnormal ECG When compared with ECG of 16-MAY-2024 21:13, Significant changes have occurred Confirmed by Jovanny Loera ([id number]) on 5/30/2024 11:03:21 AM    US chest    Result Date: 5/29/2024  Interpreted By:  Dinh Plunkett, STUDY: US CHEST; 5/29/20243:53 pm   INDICATION: Signs/Symptoms:Thoracentesis (Left) diagnostic/therapeutic.   COMPARISON: None.   ACCESSION NUMBER(S): [id number]   ORDERING CLINICIAN: ALEXANDRA AVILA   TECHNIQUE: INTERVENTIONALIST(S): Dinh Plunkett   CONSENT: The patient/patient's POA/next of kin was informed of the nature of the proposed procedure. The purposes, alternatives, risks, and benefits were explained and discussed. All questions were answered and consent was obtained.   SEDATION: None   TIME OUT: A time out was performed immediately prior to procedure start with the interventional team, correctly identifying the patient name, date of birth, MRN, procedure, anatomy (including marking of site and side), patient position, procedure consent form, relevant laboratory and imaging test results, antibiotic administration, safety precautions, and procedure-specific equipment needs.   FINDINGS: The patient was placed in the supine position.   The thoracic space was examined with grey scale ultrasound, and an absence of free fluid was demonstrated on ultrasound. Thoracic images were captured to demonstrate. A thoracentesis was not performed.       Absence of free fluid for procedure. A thoracentesis was not performed.   I personally performed and/or directly supervised this study and was present for the entire procedure.   Performed and dictated at [facility].   Signed by: Dinh Plunkett 5/29/2024 3:53 PM Dictation workstation:   [id number]    XR chest 1 view    Result Date:  5/28/2024  Interpreted By:  Jovany Nuñez, STUDY: XR CHEST 1 VIEW;  5/28/2024 8:03 am   INDICATION: Signs/Symptoms:Rule out infection.   COMPARISON: Chest radiograph dated 5/27/2024   ACCESSION NUMBER(S): [id number]   ORDERING CLINICIAN: ALEXANDRA AVILA   FINDINGS: AP radiograph of the chest   Pacemaker electrodes appear in adequate position. There is virtually complete opacification of the left thorax. The heart mediastinum are shifted to the left indicating volume loss. Compensatory hyperaeration of the right lung and pulmonary vascular shunting to the right lung is noted increased from previous study.         1. Virtually complete opacification of the left thorax with heart and mediastinum shifted to the left indicating volume loss 2. Compensatory pulmonary vascular shunting to the right lung       Signed by: Jovany Nuñez 5/28/2024 10:38 AM Dictation workstation:   [id number]    Electrocardiogram, 12-lead PRN ACS symptoms    Result Date: 5/28/2024  Atrial fibrillation with rapid ventricular response Nonspecific ST and T wave abnormality , probably digitalis effect Abnormal ECG When compared with ECG of 28-MAY-2024 01:39, (unconfirmed) No significant change was found Confirmed by Jovanny Loera ([id number]) on 5/28/2024 10:17:48 AM    FL modified barium swallow study    Result Date: 5/26/2024  Interpreted By:  Sohail Torres,  and Xavi Jerez STUDY: FL MODIFIED BARIUM SWALLOW STUDY;; 5/25/2024 9:06 am   INDICATION: Signs/Symptoms:r\o dysphagia.   COMPARISON: None.   ACCESSION NUMBER(S): [id number]   ORDERING CLINICIAN: ALEXANDRA AVILA   TECHNIQUE: MBSS completed. Informed verbal consent obtained prior to completion of exam. Trials of thin, nectar thick,  puree, and regular solids given. Fluoroscopy time :  2 minutes.   SLP: Meri Hurley MS CCC/SLP Phone/Pager: Epic Chat   SPEECH FINDINGS: Reason for referral: Further assessment of oropharyngeal swallow Patient hx: Recent RLL PNA. S/s of aspiration w/3 oz Swallow  Protocol Respiratory status: Nasal cannula Previous diet: Reg/thin   FINAL SPEECH RECOMMENDATIONS   Diet recommendations/feeding strategies: Solid Diet Recommendations : Easy to Chew Liquid Diet Recommendations: Thin Medication Administration: Single w/ sips of water, whole in puree as needed   Safe Swallow Strategies/Guidelines: Only present PO intake when awake and alert Supervision/assist w/ PO intake to assure adherence to safe swallow strategies Upright positioning Slow rate/small boluses   Follow-up speech therapy recommended: Yes.   Short term goals: Pt will tolerate least restrictive diet with adequate oral phase and no s/s of aspiration. Date initiated: 5/25/24 Status: Goal initiated   Pt will adhere to safe swallow strategies during PO intake with > 90% accuracy independently. Date initiated: 5/25/24 Status: Goal initiated   Education provided: Yes.     Mechanics of the swallow summary: *Oral phase: Mild deficits.   Adequate bolus acceptance.  Prolonged mastication and bolus formation/transit w/ regular solids.  No oral residue.   *Pharyngeal phase: Mild deficits.   Incomplete epiglottic inversion, may be related to presence of NGT. Timely swallow initiation.  Adequate hyolaryngeal elevation/excursion.  Trace penetration x1 with consecutive boluses of thin liquids related to incomplete airway protection.  Ejected upon swallow completion.  No other penetration and no aspiration with any other consistency assessed.  Mild residue at the valleculae following the swallow.  Largely cleared with liquid wash.  Mildly reduced distention of PES.     SLP impressions with severity rating: Mild oropharyngeal dysphagia characterized by prolonged mastication/bolus formation and mild residue at the valleculae following the swallow.   Thin Liquids (MBSS) Rosenbek's Penetration Aspiration Scale, Thin Liquids (MBSS): 2. PENETRATION that CLEARS - contrast enter airway, above vocal cords, no residue     Nectar Thick Liquids  (MBSS) Rosenbek's Penetration Aspiration Scale, Nectar thick liquids (MBSS): 1. NO ASPIRATION & NO PENETRATION - no aspiration, contrast does not enter airway       Purees (MBSS) Rosenbek's Penetration Aspiration Scale, Purees (MBSS): 1. NO ASPIRATION & NO PENETRATION - no aspiration, contrast does not enter airway     Solids (MBSS) Rosenbek's Penetration Aspiration Scale, Solids (MBSS): 1. NO ASPIRATION & NO PENETRATION - no aspiration, contrast does not enter airway     Speech Therapy section of this report signed by Meri Hurley MS CCC/SLP on 5/25/2024 at 10:17 am.   RADIOLOGY FINDINGS: Nasoenteric tube is partially visualized. No evidence of acute osseous abnormality of the cervical spine. Patient is edentulous.   Radiology section of this report signed by Dr. Torres.       1. No radiographic evidence of acute process in the neck. 2. Please refer to speech pathology report for additional findings.   MACRO: None   Signed by: Sohail Torres 5/26/2024 3:51 PM Dictation workstation:   [id number]    XR abdomen 1 view    Result Date: 5/25/2024  Interpreted By:  Marcie Shah, STUDY: XR ABDOMEN 1 VIEW; 5/25/2024 5:45 pm   INDICATION: Signs/Symptoms:ngt placed.   COMPARISON: Radiograph dated 05/25/2024, 2:33 p.m.   ACCESSION NUMBER(S): [id number]   ORDERING CLINICIAN: ALEXANDRA AVILA   FINDINGS: Single-view of the abdomen. The pelvis is not included. Enteric tube is in place with the tip projecting over the stomach. Positive contrast is identified in the gastric fundus. Prominent loops of bowel throughout the visualized upper abdomen. Nonobstructive bowel gas pattern.  Limited evaluation of pneumoperitoneum on supine imaging, however no gross evidence of free air is noted.   Extensive consolidation in visualized portion of the left lung.   Osseous structures demonstrate no acute bony changes.       1.  Enteric tube projecting over the proximal stomach. 2. Again seen multiple prominent loops of bowel  throughout the upper abdomen. Correlation with ileus. Recommend follow-up radiograph.   Signed by: Marcie Rodriguez 5/25/2024 5:54 PM Dictation workstation:   [id number]    XR abdomen 1 view    Result Date: 5/25/2024  Interpreted By:  Marcie Shah, STUDY: XR ABDOMEN 1 VIEW; 5/25/2024 3:06 pm   INDICATION: Signs/Symptoms:distended abdomen, assess for dilated bowel loops.   COMPARISON: Radiograph dated 05/21/2024   ACCESSION NUMBER(S): [id number]   ORDERING CLINICIAN: ALEXANDRA AVILA   FINDINGS: Views of the abdomen and pelvis. What is presumed to be enteric tube is projecting over the lower mediastinum. Positive contrast is identified in the expected location of the stomach. There is also positive contrast throughout the loops of bowel in the lower abdomen. Prominent loops of colon. Limited evaluation of pneumoperitoneum on supine imaging, however no gross evidence of free air is noted.   Consolidative opacities in left lung.   Osseous structures demonstrate no acute bony changes.       1.  Prominent loops of colon which can be due to ileus. Recommend follow-up radiograph.   Signed by: Marcie Rodriguez 5/25/2024 5:33 PM Dictation workstation:   [id number]    XR chest 1 view    Result Date: 5/23/2024  Interpreted By:  Jovany Nuñez and Baker Zachary STUDY: XR CHEST 1 VIEW; ;  5/22/2024 6:45 pm   INDICATION: Signs/Symptoms:Worsening tachypnea, confusion.   COMPARISON: Chest radiograph on 05/21/2024.   ACCESSION NUMBER(S): [id number]   ORDERING CLINICIAN: SUN CHAMPAGNE   FINDINGS: Single AP view of the chest.   Right subclavian vein approach pacemaker/AICD in place with leads projecting over the right atrium and ventricle. Enteric tube coursing below diaphragm tip overlying the expected position of the gastric body.   The cardiomediastinal silhouette is obscured, similar to prior exam.   Persistent near-complete opacification of the left hemithorax, with mild interval increase in air bronchograms  within the left upper lung zone. Redemonstration of patchy right infrahilar airspace opacities and interstitial prominence on the right. No right-sided pleural effusion or pneumothorax.   No acute osseous abnormality.       1. Persistent near-complete opacification of the left hemithorax, with mild interval increase in air bronchograms within the left upper lung zone. 2. Persistent patchy right infrahilar airspace opacities and interstitial prominence throughout the right lung, which may represent aspiration/pneumonia in the appropriate clinical setting. 3. Medical devices as above.   I personally reviewed the images/study and I agree with the findings as stated by Dr. Raymond Armstrong M.D. This study was interpreted at [place], [place].   MACRO: None   Signed by: Jovany Nuñez 5/23/2024 7:32 AM Dictation workstation:   [id number]    CT chest abdomen pelvis wo IV contrast    Result Date: 5/23/2024  Interpreted By:  Sohail Torres,  and Jeff Ibrahim STUDY: CT CHEST ABDOMEN PELVIS WO CONTRAST;  5/22/2024 12:15 pm   INDICATION: Signs/Symptoms:c/f sepsis.   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 N/V/D with [employer] concernign for UPI GIB and ongoing fevers.   COMPARISON: CT chest/abdomen/pelvis 05/18/2024   ACCESSION NUMBER(S): [id number]   ORDERING CLINICIAN: SUN CHAMPAGNE   TECHNIQUE: CT of the chest, abdomen and pelvis was performed. Contiguous axial images were obtained at 3 mm slice thickness through the chest, abdomen and pelvis. Coronal and sagittal reconstructions at 3 mm slice thickness were performed.  No intravenous contrast was administered; positive oral contrast was given.   FINDINGS: Please note that the study is limited without intravenous contrast.   Respiratory motion artifact.   CHEST:   LUNG/PLEURA/LARGE AIRWAYS: Trachea and  bilateral mainstem bronchi are patent.   Again seen complete opacification of the left hemithorax with air bronchograms consistent with significant collapse of the left lung.   There is left pleural thickening (example series 2, image 38) with a focus of left posterior chest wall extension at the level of the 9th-10th (series 2, image 71) and 10th-11th (series 2, image 82) rib spaces, similar to prior, findings consistent with patient's known mesothelioma.   Similar small loculated pleural effusion measuring 7.1 x 4.1 x 2.3 cm (series 2, image 66 and series 900, image 62).   Interval improvement of right lower lobe ill-defined patchy infiltrate (series 4, image 143). Slight interval increase in right trace pleural effusion. No new focal consolidations. No evidence of pneumothorax.   VESSELS: The previously noted ill-defined hypodensity in the left pulmonary artery is not well evaluated on this exam due to beam hardening artifact, positioning of patient's arms, and lack of venous contrast.   Dilated main pulmonary artery measuring 3.8 cm (series 2, image 40), similar to prior. Ectatic ascending aorta measuring 4.1 cm, similar to prior.  Mild to moderate coronary artery calcifications are present.   HEART: The heart is normal in size.  Trace pericardial fluid, similar to prior. Right-sided subclavian approach dual-chambercardiac ICD/pacemaker, with the leads in the right atrium and right ventricle.   MEDIASTINUM AND ANDREA: Multiple prominent mediastinal and perihilar lymph nodes similar to prior. For example:   1.3 cm subcarinal lymph node (series 2, image 48).   1 cm perihilar lymph node (series 2, image 33).   Enteric tube courses through the esophagus and terminates in the body of the stomach. Otherwise otherwise esophagus is within normal limits.   CHEST WALL AND LOWER NECK: Right chest wall cardiac pacemaker as described above. The visualized thyroid gland appears within normal limits.   ABDOMEN:   LIVER: The  liver is normal in size. Similar hypoattenuating lesions with the largest measuring 2 cm segment 4A (series 2, image 71).   BILE DUCTS: The bile ducts are not dilated.   GALLBLADDER: The gallbladder is not distended. Calcified stones are noted.   PANCREAS: The pancreas appears unremarkable.   SPLEEN: Within normal limits.   ADRENAL GLANDS: Bilateral adrenal glands appear normal.   KIDNEYS AND URETERS: Bilateral mildly atrophic kidneys with mild perinephric stranding similar to prior. There is a 0.6 cm left midpole nonobstructing renal calculi. No hydroureteronephrosis. No right nephroureterolithiasis.   PELVIS:   BLADDER: The urinary bladder is underdistended with Forrest catheter in place. There is air in the urinary bladder likely from the Forrest.   REPRODUCTIVE ORGANS: No pelvic masses.   BOWEL: NG tube body of the stomach. Otherwise, the stomach is unremarkable. The small is normal in caliber and demonstrate no wall thickening. Proximal sigmoid diverticulosis with interval improvement in the mild wall thickening and adjacent fat stranding involving the proximal sigmoid colon (series 902, image 93). Interval insertion of rectal tube.   VESSELS: Mild atherosclerosis of the abdominal aorta and its branching vessels. There is no aneurysmal dilatation of the abdominal aorta..   Interval flattening of the inferior vena cava (image 2, image 121)/   PERITONEUM/RETROPERITONEUM/LYMPH NODES: No ascites or free air, no fluid collection.  The retroperitoneum appears unremarkable.  No enlarged mesenteric lymph nodes.   ABDOMINAL WALL: Within normal limits.   BONES: No suspicious osseous lesions are present. Degenerative discogenic disease is noted in the lower thoracic and lumbar spine most severe at L3-L4, L4-L5, and L5-L6..       Chest 1. Interval improvement of the right lower lobe patchy infiltrate. 2. Again seen left lung collapse, left pleural thickening with invasion into the left posterior chest wall, and small left  loculated pleural effusion consistent with patient's known mesothelioma and is unchanged when compared to prior CT from 05/18/2024. 3. Unchanged prominent mediastinal and perihilar lymph nodes. 4. Previously noted left pulmonary artery thrombus is not well evaluated on this exam due to beam hardening artifact, positioning of patient's arms, and lack of intravenous contrast.   Abdomen-Pelvis 1. Interval flattening of the inferior vena cava which may be seen with hypovolemia. Recommend correlation with patient's fluid volume status. 2. Improved proximal sigmoid diverticulitis. 3. Additional unchanged findings as noted above.   I personally reviewed the images/study and I agree with the findings as stated by Patrice Aguilar DO, PGY-2. This study was interpreted at [facility], [place], [place].   MACRO: None   Signed by: Sohail Torres 5/23/2024 12:22 AM Dictation workstation:   [id number]    Transthoracic Echo (TTE) Complete    Result Date: 5/21/2024   [facility], [address] [place], [place] [address]                Tel [phone] and Fax [phone] TRANSTHORACIC ECHOCARDIOGRAM REPORT  Patient Name:      ZAKIYA HUMMEL LULU       Reading Physician:    [id number] Miller MD Study Date:        5/21/2024            Ordering Provider:    Ming CHAMPAGNE MRN/PID:           [id number]             Fellow: Accession#:        [id number]         Nurse: Date of Birth/Age: 1935 / 88 years Sonographer:          Dionisio Braga RDCS Gender:            M                    Additional Staff: Height:            172.72 cm            Admit Date: Weight:            109.77 kg            Admission Status:     Inpatient -                                                                Routine BSA / BMI:         2.22 m2 / 36.80      Encounter#:           [id number]                    kg/m2                                         Department Location:  [place] [address] Blood Pressure: 96 /57 mmHg Study Type:    TRANSTHORACIC ECHO (TTE) COMPLETE Diagnosis/ICD: Unspecified atrial fibrillation-I48.91; Hypertensive heart                disease with heart failure-I11.0 Indication:    hypoxic resp failure w/ new o2 requirement c/f HF CPT Code:      Echo Complete w Full Doppler-[id number] Patient History: Pertinent History: PAD, afib/flutter, hx DVT on warfarin, HTN, CAD s/p stent,                    mitral regurg, HLD,JOVANNI, basal cell ca on face, s/p removal                    and radiation of malignant mesothelioma, S/p L VATS with                    decort 5/2022. Study Detail: The following Echo studies were performed: 2D, M-Mode, color flow               and Doppler. Technically challenging study due to body habitus,               patient lying in supine position, poor acoustic windows and               prominent lung artifact. Definity used as a contrast agent for               endocardial border definition. Total contrast used for this               procedure was 2.0 mL via IV push. Unable to obtain suprasternal               notch view.  PHYSICIAN INTERPRETATION: Left Ventricle: The left ventricular systolic function is hyperdynamic, with an estimated ejection fraction of 70-75%. The patient is in atrial fibrillation which may influence the estimate of left ventricular function and transvalvular flows. There are no regional wall motion abnormalities. The left ventricular cavity size is normal. Left ventricular diastolic filling was not assessed. Left Atrium: The left atrium was not well visualized. Right Ventricle: The right ventricle was not well visualized. Unable to determine right ventricular systolic function. Right Atrium: The right atrium was not well visualized. Aortic Valve: The aortic valve is  trileaflet. There is trivial aortic valve regurgitation. The peak instantaneous gradient of the aortic valve is 13.2 mmHg. Mitral Valve: The mitral valve is normal in structure. There is mild mitral annular calcification. There is trace mitral valve regurgitation. Tricuspid Valve: The tricuspid valve was not well visualized. Tricuspid regurgitation was not well visualized. Tricuspid regurgitation was not assessed. The Doppler estimated RVSP is mildly elevated at 36.6 mmHg. Pulmonic Valve: The pulmonic valve is not well visualized. There is physiologic pulmonic valve regurgitation. Pericardium: There is a trivial pericardial effusion. Aorta: The aortic root is abnormal. The aortic root appears moderately dilated and measures 4.50 cm. The Asc Ao is 3.70 cm. There is mild dilatation of the ascending aorta. Systemic Veins: The inferior vena cava appears to be of normal size. There is IVC inspiratory collapse greater than 50%. In comparison to the previous echocardiogram(s): Compared with study from 5/3/2022, the previous study was a MARY during a MARY/DCCV and comparison is limited.  CONCLUSIONS:  1. Poorly visualized anatomical structures due to suboptimal image quality.  2. Left ventricular systolic function is hyperdynamic with a 70-75% estimated ejection fraction.  3. The patient is in atrial fibrillation which may influence the estimate of left ventricular function and transvalvular flows.  4. Mildly elevated RVSP.  5. Moderately dilated aortic root. QUANTITATIVE DATA SUMMARY: 2D MEASUREMENTS:                          Normal Ranges: Ao Root d:     4.50 cm   (2.0-3.7cm) LAs:           2.50 cm   (2.7-4.0cm) IVSd:          0.80 cm   (0.6-1.1cm) LVPWd:         0.80 cm   (0.6-1.1cm) LVIDd:         4.10 cm   (3.9-5.9cm) LVIDs:         2.20 cm LV Mass Index: 43.9 g/m2 LV % FS        46.3 % AORTA MEASUREMENTS:                    Normal Ranges: Asc Ao, d: 3.70 cm (2.1-3.4cm) LV SYSTOLIC FUNCTION BY 2D PLANIMETRY (MOD):                      Normal Ranges: EF-A4C View: 73.6 % (>=55%) EF-A2C View: 80.4 % EF-Biplane:  78.8 % LV DIASTOLIC FUNCTION:                     Normal Ranges: MV Peak E: 1.09 m/s (0.7-1.2 m/s) MV DT:     245 msec (150-240 msec) MITRAL VALVE:                 Normal Ranges: MV DT: 245 msec (150-240msec) AORTIC VALVE:                          Normal Ranges: AoV Vmax:      1.82 m/s  (<=1.7m/s) AoV Peak P.2 mmHg (<20mmHg) LVOT Max Philipp:  1.42 m/s  (<=1.1m/s) LVOT VTI:      18.80 cm LVOT Diameter: 2.00 cm   (1.8-2.4cm) AoV Area,Vmax: 2.45 cm2  (2.5-4.5cm2)  RIGHT VENTRICLE: TAPSE: 15.8 mm RV s'  0.18 m/s TRICUSPID VALVE/RVSP:                             Normal Ranges: Peak TR Velocity: 2.90 m/s RV Syst Pressure: 36.6 mmHg (< 30mmHg) PULMONIC VALVE:                      Normal Ranges: PV Max Philpip: 0.9 m/s  (0.6-0.9m/s) PV Max PG:  3.2 mmHg  [id number] Delvis Miller MD Electronically signed on 2024 at 5:46:15 PM  ** Final **     XR abdomen 1 view    Result Date: 2024  Interpreted By:  Jovany Nuñez, STUDY: XR ABDOMEN 1 VIEW;  2024 12:58 pm   INDICATION: Signs/Symptoms:post ngt.   COMPARISON: One-view abdomen 2024 13 a.m.   ACCESSION NUMBER(S): [id number]   ORDERING CLINICIAN: SUN CHAMPAGNE   FINDINGS: One-view abdomen   An enteric tube is positioned within the region of the gastric fundus several cm below the expected gastroesophageal junction. There is a predominantly fluid-filled small bowel and colon pattern. There is less gastric distention with air.   Opacification of the left thorax and multiple air bronchograms are noted.       1.  The enteric tube is positioned within the gastric fundus 2. Advancement of the tube would be recommended for more optimal positioning 3. Predominantly fluid-filled small bowel and colon pattern 4. Opacification of the visualized left thorax and multiple air bronchograms within the left lung   MACRO: None   Signed by: Jovany Nuñez 2024 2:19 PM Dictation workstation:    [id number]    XR abdomen 1 view    Result Date: 5/21/2024  Interpreted By:  Jovany Nuñez, STUDY: XR ABDOMEN 1 VIEW;  5/21/2024 11:13 am   INDICATION: Signs/Symptoms:ABD DISTENTION.   COMPARISON: None.   ACCESSION NUMBER(S): [id number]   ORDERING CLINICIAN: SUN CHAMPAGNE   FINDINGS: The stomach is distended with air. There is a predominantly fluid-filled small bowel and colon pattern. Gas is demonstrated within the colon and distal ileum. The left ventricular pacemaker electrode appears in adequate position. The left ventricle appears mildly enlarged. Osseous and articular anatomy is normal for age.       1.  The stomach is moderately distended with air otherwise, nonspecific predominantly fluid-filled bowel pattern   MACRO: None   Signed by: Jovany Nuñez 5/21/2024 2:17 PM Dictation workstation:   [id number]    XR chest 1 view    Result Date: 5/21/2024  Interpreted By:  Jovany Nuñez and Calo Sean-Matthew STUDY: XR CHEST 1 VIEW;  5/21/2024 6:14 am   INDICATION: Signs/Symptoms:SOB.   COMPARISON: Chest radiograph 05/16/2024 CT chest 05/18/2024   ACCESSION NUMBER(S): [id number]   ORDERING CLINICIAN: SUN CHAMPAGNE   FINDINGS: AP radiograph of the chest was provided.   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   CARDIOMEDIASTINAL SILHOUETTE: Cardiomediastinal silhouette is stable in size with complete obscuration of the left cardiomediastinal border.   LUNGS: Persistent near-complete opacification of the left hemithorax with interval reduced air bronchograms compared to prior. Persistent patchy right infrahilar airspace opacities are noted. Prominent perihilar and interstitial lung markings are noted on the right. No consolidation, effusion, or pneumothorax on the right.   ABDOMEN: No remarkable upper abdominal findings.   BONES: No acute osseous changes.       1. Virtually complete opacification of the left hemithorax with  interval reduced air bronchograms. 2. Persistent patchy right infrahilar airspace opacities which may reflect aspiration changes and/or pneumonia in the appropriate clinical setting.   I personally reviewed the images/study and I agree with the findings as stated by Matthew Lindo MD. This study was interpreted at [facility], [place], [place].   MACRO: None   Signed by: Jovany Nuñez 5/21/2024 11:26 AM Dictation workstation:   [id number]    XR abdomen 1 view    Result Date: 5/20/2024  Interpreted By:  Sofya Ahumada, STUDY: XR ABDOMEN 1 VIEW;  5/19/2024 10:42 pm   INDICATION: Signs/Symptoms:abdominal distension.   COMPARISON: CT: 05/18/2024   ACCESSION NUMBER(S): [id number]   ORDERING CLINICIAN: SUN CHAMPAGNE   FINDINGS: Nonobstructive bowel gas pattern. Limited evaluation of pneumoperitoneum on supine imaging, however no gross evidence of free air is noted.   Whiteout throughout the left hemithorax.   Osseous structures demonstrate no acute bony changes.       1.  Nonspecific and nonobstructive gas pattern.   MACRO: None   Signed by: Sofya Ahumada 5/20/2024 8:47 AM Dictation workstation:   [id number]    Radiology:  ECG 12 lead    Result Date: 6/15/2024  Accelerated Junctional rhythm Low voltage QRS Nonspecific ST abnormality Abnormal ECG When compared with ECG of 30-MAY-2024 08:33, Junctional rhythm has replaced Atrial flutter See ED provider note for full interpretation and clinical correlation Confirmed by Chavo Cid ([id number]) on 6/15/2024 11:11:58 AM    US scrotum w doppler    Result Date: 6/14/2024  Interpreted By:  Julieta Callahan, STUDY: US SCROTUM WITH DOPPLER; 6/14/2024 4:37 pm   INDICATION: Swelling, pain, and erythema.   COMPARISON: None.   ACCESSION NUMBER(S): [id number]   ORDERING CLINICIAN: JOVANY GUZMAN   TECHNIQUE: Gray scale and color doppler imaging of the scrotum was performed with spectral waveform analysis. Arterial inflow and venous outflow documented.  Duplex Doppler interrogation including color flow and spectral waveform analysis is performed.   FINDINGS: Right testis: 3.4 x 2.0 x 3.2 cm.   Left testis: 4.9 x 2.9 x 3.0 cm.   Epididymides: The epididymides are normal in size and echogenicity. There is a 2 x 3 x 3 mm cyst within right epididymal head.   The testes appear homogeneous with no intratesticular lesions. Ectasia of the rete testes on the right is present. Duplex Doppler interrogation of each testicle including color flow and spectral waveform analysis shows normal arterial and venous flow without torsion or orchitis.   There is a 3.6 x 6.0 x 3.0 encapsulated fluid collection anterior and superior to the right testicle containing low-level internal echoes and debris. This does not arise from the right epididymis. This may represent a scrotal wall abscess. There appears to be diffuse thickening of the scrotal wall.       3.0 x 3.6 x 6.0 cm encapsulated fluid collection noted which is extratesticular in location and is seen anterior and superior to the right testicle. This contains low-level internal echoes and some thickening of its wall with internal debris. This may represent a scrotal abscess. Clinical correlation is necessary.   Ectasia of the rete testes on the right.   3 mm cyst right epididymal head.   No epididymal orchitis.   MACRO: None.   Signed by: Julieta Callahan 6/14/2024 4:59 PM Dictation workstation:   [id number]    XR chest 2 views    Result Date: 6/14/2024  Interpreted By:  Josh Soto, STUDY: XR CHEST 2 VIEWS;  6/14/2024 3:10 pm   INDICATION: Signs/Symptoms:Cough.   COMPARISON: 05/28/2024   ACCESSION NUMBER(S): [id number]   ORDERING CLINICIAN: WES ORTIZ   FINDINGS: Pacemaker leads are intact and properly positioned.       CARDIOMEDIASTINAL SILHOUETTE: Cardiomediastinal silhouette is normal in size and configuration.   LUNGS: There is complete opacification left hemithorax with volume loss and shift of the mediastinum to the left.    Since the prior examination further increase in density of the right lung base which may suggest atelectasis or infiltrate. Correlation with auscultation and inflammatory markers recommended. A small to moderate on right pleural effusion persists.   ABDOMEN: No remarkable upper abdominal findings.   BONES: No acute osseous changes.       1.  Complete opacification with volume loss left hemithorax. Increased density right lung base reflecting small to moderate right pleural effusion. Superimposed pneumonia can not be excluded.       MACRO: None   Signed by: Josh Soto 6/14/2024 3:36 PM Dictation workstation:   [id number]    CT abdomen pelvis wo IV contrast    Result Date: 6/14/2024  Interpreted By:  Schoenberger, Joseph, STUDY: CT ABDOMEN PELVIS WO IV CONTRAST;  6/14/2024 2:55 pm   INDICATION: Signs/Symptoms:Scrotal swelling, erythema, erythema extending up into the abdomen.   COMPARISON: 05/22/2024   ACCESSION NUMBER(S): [id number]   ORDERING CLINICIAN: WES ORTIZ   TECHNIQUE: CT of the abdomen and pelvis was performed. Contiguous axial images were obtained at 3 mm slice thickness through the abdomen and pelvis. Coronal and sagittal reconstructions at 3 mm slice thickness were performed.  No intravenous or oral contrast agents were administered.   FINDINGS: Please note that the evaluation of vessels, lymph nodes and organs is limited without intravenous contrast.   LOWER CHEST: There is some persistent volume loss in the left lower lobe with persistent consolidation. There are few air bronchograms. There is rounded collection of fluid at the anterior aspect of this process which may relate to either necrotizing pneumonia or loculated pleural fluid or conceivably pulmonary abscess. However this is also unchanged from the prior. There is a however, a new moderate dependent layering right pleural effusion.   ABDOMEN:   LIVER: 2 cysts located in the superior aspect the left liver lobe are stable from the  prior. No other focal abnormality.   BILE DUCTS: No dilation   GALLBLADDER: Dependent layering calcified gallstones. No wall thickening or pericholecystic fluid.   PANCREAS: Unremarkable   SPLEEN: Unremarkable   ADRENAL GLANDS: Bilateral adrenal glands appear normal.   KIDNEYS AND URETERS: The kidneys are normal in size and unremarkable in appearance.  No hydroureteronephrosis or nephroureterolithiasis is identified. 5 mm nonobstructing lower pole right renal stone unchanged.   PELVIS:   BLADDER: High choose 1   REPRODUCTIVE ORGANS: Unremarkable   BOWEL: The stomach is unremarkable.  Small bowel loops are normal in caliber without mural thickening. Colon is normal in caliber. There is a mobile cecum. There are multiple colonic diverticula. There is no convincing evidence for acute diverticulitis. Normal appendix.   VESSELS: There is no aneurysmal dilatation of the abdominal aorta. The IVC appears normal.   PERITONEUM/RETROPERITONEUM/LYMPH NODES: There is no free or loculated fluid collection, no free intraperitoneal air. The retroperitoneum appears normal.  No abdominopelvic lymphadenopathy is present. The   ABDOMINAL WALL: There is soft tissues Amanda in the abdominal wall which is new from the prior. This is a set metric Roseann more pronounced on the left than on the right. This extends into the lower abdominal left-sided pannus. It does not appear to overtly involve the scrotum. There may be loculated right-sided hydrocele.   BONES: No suspicious osseous lesions are identified. 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       1.  The high significant development of abdominal wall edema in the subcutaneous tissues asymmetrically worse on the left. This does not appear to especially extend into the scrotum. 2. There is a new moderate right pleural effusion. 3. Persistent left basal consolidation with anterior oval shaped collection of fluid with similar appearance to prior other  than lack of air bronchograms on this exam compared to the prior. Associated volume loss.     MACRO: None   Signed by: Joseph Schoenberger 6/14/2024 3:30 PM Dictation workstation:   [id number]     Current Facility-Administered Medications:     acetaminophen (Tylenol) tablet 650 mg, 650 mg, oral, q4h PRN **OR** acetaminophen (Tylenol) oral liquid 650 mg, 650 mg, nasogastric tube, q4h PRN **OR** acetaminophen (Tylenol) suppository 650 mg, 650 mg, rectal, q4h PRN, Vipul Sotelo MD    albuterol 2.5 mg /3 mL (0.083 %) nebulizer solution 2.5 mg, 2.5 mg, nebulization, q6h PRN, Deondre Wagner MD    benzonatate (Tessalon) capsule 100 mg, 100 mg, oral, TID PRN, Vipul Sotelo MD, 100 mg at 06/17/24 1227    cefepime (Maxipime) in dextrose 5% 50 mL IV 1 g, 1 g, intravenous, q12h, Vipul Sotelo MD, 1 g at 06/17/24 0930    [Held by provider] dilTIAZem (Cardizem) 125 mg in dextrose 5% 125 mL (1 mg/mL) infusion (premix), 5 mg/hr, intravenous, Continuous, Deondre Wagner MD, Stopped at 06/17/24 1151    [Held by provider] enoxaparin (Lovenox) syringe 120 mg, 1 mg/kg, subcutaneous, q24h, Vipul Sotelo MD, 120 mg at 06/15/24 2041    melatonin tablet 3 mg, 3 mg, oral, Nightly PRN, Vipul Sotelo MD, 3 mg at 06/16/24 0435    metoprolol tartrate (Lopressor) tablet 25 mg, 25 mg, oral, BID, Chavo Cid MD, 25 mg at 06/17/24 0801    metroNIDAZOLE (Flagyl) 500 mg in NaCl (iso-os) 100 mL, 500 mg, intravenous, q8h, Deondre Wagner MD, Last Rate: 0 mL/hr at 06/17/24 0926, 500 mg at 06/17/24 1639    ondansetron (Zofran) tablet 4 mg, 4 mg, oral, q8h PRN **OR** ondansetron (Zofran) injection 4 mg, 4 mg, intravenous, q8h PRN, Vipul Sotelo MD    [DISCONTINUED] pantoprazole (ProtoNix) EC tablet 40 mg, 40 mg, oral, Daily before breakfast, 40 mg at 06/16/24 0435 **OR** pantoprazole (ProtoNix) injection 40 mg, 40 mg, intravenous, BID, Deondre Wagner MD, 40 mg at 06/17/24 0800    polyethylene glycol (Glycolax, Miralax) packet 17 g, 17  g, oral, Daily PRN, Vipul Sotelo MD    promethazine-DM (Phenergan-DM) 6.25-15 mg/5 mL syrup 5 mL, 5 mL, oral, q4h PRN, Deondre Wagner MD    rosuvastatin (Crestor) tablet 10 mg, 10 mg, oral, Daily, Deondre Wagner MD, 10 mg at 06/17/24 0801    tamsulosin (Flomax) 24 hr capsule 0.4 mg, 0.4 mg, oral, Daily, Deondre Wagner MD, 0.4 mg at 06/17/24 0801    traMADol (Ultram) tablet 50 mg, 50 mg, oral, q8h PRN, Deondre Wagner MD, 50 mg at 06/16/24 1035    [START ON 6/18/2024] vancomycin (Vancocin) 1,500 mg in dextrose 5%  mL, 1,500 mg, intravenous, q48h, Xenia Maguire, formerly [facility]    vancomycin (Vancocin) pharmacy to dose - pharmacy monitoring, , miscellaneous, Daily PRN, Kati Miller, formerly [facility]   Principal Problem:    Cellulitis of scrotum  Active Problems:    Atrial fibrillation with RVR (Multi)    Benign essential hypertension    Acute on chronic respiratory failure with hypoxia (Multi)    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Multi)    Recurrent pleural effusion    Abdominal wall cellulitis      Assessment/Plan     Extensive cellulitis and edema of the abdominal wall and lower extremities and scrotum 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  Patient recently had clinical trial of chemo and patient did not tolerate.  No further plan for advanced mesothelioma.  Right pleural effusion .patient does not want any thoracentesis and want to be treated for cellulitis now.  Acute on chronic respiratory failure with hypoxia.  COPD continue bronchodilator.  Former smoker.  Morbid obesity.  Obstructive sleep apnea.  History of A-fib with RVR.  Hypertension.  Dyslipidemia.  GERD.  Patient DNR.  DVT prophylaxis.  PT OT.  P.o. diet.  6/15/2024 patient's leg and abdominal swelling and redness improving.  Mesothelioma is advanced and not able to tolerate any clinical trial chemo.  Patient recently was  seen at [facility].  6/16/2024 patient has intermittent A-fib RVR.  Patient seen by EP Dr. 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  Patient recently had clinical trial of chemo and patient did not tolerate.  No further plan for advanced mesothelioma.  Right pleural effusion .patient does not want any thoracentesis and want to be treated for cellulitis now.  Acute on chronic respiratory failure with hypoxia.  COPD continue bronchodilator.  Former smoker.  Morbid obesity.  Obstructive sleep apnea.  History of A-fib with RVR.  Hypertension.  Dyslipidemia.  GERD.  Patient DNR.  DVT prophylaxis.  PT OT.  P.o. diet.  6/15/2024 patient's leg and abdominal swelling and redness improving.  Mesothelioma is advanced and not able to tolerate any clinical trial chemo.  Patient recently was seen at [facility].  . 6/16?24 Scrotal abscesses/hydrocele patient seen by urology plan to continue IV antibiotic patient clinically improving.  Advanced mesothelioma ,no further treatment plan for chemo.  Right pleural effusion patient does not want thoracentesis or an aggressive treatment  6/17/2024 patient has significant cord morbid condition and mesothelioma advanced no further treatment helpful.  Patient and family decided hospice.    Luis Villasenor MD

## 2024-06-17 NOTE — PROGRESS NOTES
Cardiology Progress Note  Patient: Vinicius Arriola  Unit/Bed: 1008/1008-A  YOB: 1935  MRN: [id number]  Acct: [id number]   Admitting Diagnosis:   Cellulitis of scrotum [N49.2]  Date:  6/14/2024  Hospital Day: 3  Attending: Deondre Wagner MD   Rounding ALEX/Cardiologist:  Shantelle Liang, APRN-CNP,        Primary Cardiologist: Dr. Chavo Cid      Complaint:  Chief Complaint   Patient presents with    Groin Swelling     Cellulitis        SUBJECTIVE    Denies palpitations, dizziness, or lightheadedness  Remains in atrial fibrillation with heart rate 60 to 110 bpm        VITALS   Visit Vitals  BP 89/53 (BP Location: Left arm, Patient Position: Lying)   Pulse 60   Temp 37 °C (98.6 °F) (Temporal)   Resp 20        I/O:    Intake/Output Summary (Last 24 hours) at 6/17/2024 1326  Last data filed at 6/17/2024 1253  Gross per 24 hour   Intake 862.33 ml   Output 2200 ml   Net -1337.67 ml        Allergies:  Allergies   Allergen Reactions    Benadryl Allergy Decongestant Anxiety and Insomnia    Diphenhydramine Anxiety     anxious    Diphenhydramine Hcl Anxiety and Insomnia        PHYSICAL EXAM   Physical Exam  Constitutional:       Appearance: Normal appearance. He is obese.   HENT:      Head: Normocephalic.      Nose: Nose normal.      Mouth/Throat:      Mouth: Mucous membranes are moist.   Eyes:      Conjunctiva/sclera: Conjunctivae normal.   Cardiovascular:      Rate and Rhythm: Normal rate. Rhythm irregular.      Pulses: Normal pulses.   Pulmonary:      Effort: Pulmonary effort is normal.      Breath sounds: Normal breath sounds.   Musculoskeletal:         General: Normal range of motion.   Skin:     General: Skin is warm and dry.   Neurological:      General: No focal deficit present.      Mental Status: He is alert and oriented to person, place, and time.   Psychiatric:         Mood and Affect: Mood normal.         Behavior: Behavior normal.           LABS     Results for orders placed or performed during  the hospital encounter of 06/14/24 (from the past 24 hour(s))   Vancomycin   Result Value Ref Range    Vancomycin 14.8 5.0 - 20.0 ug/mL   Basic Metabolic Panel   Result Value Ref Range    Glucose 97 74 - 99 mg/dL    Sodium 141 136 - 145 mmol/L    Potassium 4.8 3.5 - 5.3 mmol/L    Chloride 110 (H) 98 - 107 mmol/L    Bicarbonate 24 21 - 32 mmol/L    Anion Gap 12 10 - 20 mmol/L    Urea Nitrogen 48 (H) 6 - 23 mg/dL    Creatinine 3.31 (H) 0.50 - 1.30 mg/dL    eGFR 17 (L) >60 mL/min/1.73m*2    Calcium 8.0 (L) 8.6 - 10.3 mg/dL   Magnesium   Result Value Ref Range    Magnesium 1.50 (L) 1.60 - 2.40 mg/dL   SST TOP   Result Value Ref Range    Extra Tube Hold for add-ons.    CBC and Auto Differential   Result Value Ref Range    WBC 4.7 4.4 - 11.3 x10*3/uL    nRBC 0.0 0.0 - 0.0 /100 WBCs    RBC 2.61 (L) 4.50 - 5.90 x10*6/uL    Hemoglobin 7.7 (L) 13.5 - 17.5 g/dL    Hematocrit 25.1 (L) 41.0 - 52.0 %    MCV 96 80 - 100 fL    MCH 29.5 26.0 - 34.0 pg    MCHC 30.7 (L) 32.0 - 36.0 g/dL    RDW 16.3 (H) 11.5 - 14.5 %    Platelets 130 (L) 150 - 450 x10*3/uL    Neutrophils % 67.9 40.0 - 80.0 %    Immature Granulocytes %, Automated 2.6 (H) 0.0 - 0.9 %    Lymphocytes % 9.6 13.0 - 44.0 %    Monocytes % 15.8 2.0 - 10.0 %    Eosinophils % 4.1 0.0 - 6.0 %    Basophils % 0.0 0.0 - 2.0 %    Neutrophils Absolute 3.19 1.60 - 5.50 x10*3/uL    Immature Granulocytes Absolute, Automated 0.12 0.00 - 0.50 x10*3/uL    Lymphocytes Absolute 0.45 (L) 0.80 - 3.00 x10*3/uL    Monocytes Absolute 0.74 0.05 - 0.80 x10*3/uL    Eosinophils Absolute 0.19 0.00 - 0.40 x10*3/uL    Basophils Absolute 0.00 0.00 - 0.10 x10*3/uL        Scheduled medications  cefepime, 1 g, intravenous, q12h  [Held by provider] enoxaparin, 1 mg/kg, subcutaneous, q24h  magnesium sulfate, 2 g, intravenous, Once  metoprolol tartrate, 25 mg, oral, BID  metroNIDAZOLE, 500 mg, intravenous, q8h  pantoprazole, 40 mg, intravenous, BID  rosuvastatin, 10 mg, oral, Daily  tamsulosin, 0.4 mg, oral,  Daily  [START ON 6/18/2024] vancomycin, 1,500 mg, intravenous, q48h      Continuous medications  [Held by provider] dilTIAZem, 5 mg/hr, Last Rate: Stopped (06/17/24 1151)      PRN medications  PRN medications: acetaminophen **OR** acetaminophen **OR** acetaminophen, albuterol, benzonatate, melatonin, ondansetron **OR** ondansetron, polyethylene glycol, promethazine-DM, traMADol, vancomycin     ECG 12 lead    Result Date: 6/15/2024  Accelerated Junctional rhythm Low voltage QRS Nonspecific ST abnormality Abnormal ECG When compared with ECG of 30-MAY-2024 08:33, Junctional rhythm has replaced Atrial flutter See ED provider note for full interpretation and clinical correlation Confirmed by Chavo Cid ([id number]) on 6/15/2024 11:11:58 AM    US scrotum w doppler    Result Date: 6/14/2024  Interpreted By:  Julieta Callahan, STUDY: US SCROTUM WITH DOPPLER; 6/14/2024 4:37 pm   INDICATION: Swelling, pain, and erythema.   COMPARISON: None.   ACCESSION NUMBER(S): [id number]   ORDERING CLINICIAN: QUE GUZMAN   TECHNIQUE: Gray scale and color doppler imaging of the scrotum was performed with spectral waveform analysis. Arterial inflow and venous outflow documented. Duplex Doppler interrogation including color flow and spectral waveform analysis is performed.   FINDINGS: Right testis: 3.4 x 2.0 x 3.2 cm.   Left testis: 4.9 x 2.9 x 3.0 cm.   Epididymides: The epididymides are normal in size and echogenicity. There is a 2 x 3 x 3 mm cyst within right epididymal head.   The testes appear homogeneous with no intratesticular lesions. Ectasia of the rete testes on the right is present. Duplex Doppler interrogation of each testicle including color flow and spectral waveform analysis shows normal arterial and venous flow without torsion or orchitis.   There is a 3.6 x 6.0 x 3.0 encapsulated fluid collection anterior and superior to the right testicle containing low-level internal echoes and debris. This does not arise from the right  epididymis. This may represent a scrotal wall abscess. There appears to be diffuse thickening of the scrotal wall.       3.0 x 3.6 x 6.0 cm encapsulated fluid collection noted which is extratesticular in location and is seen anterior and superior to the right testicle. This contains low-level internal echoes and some thickening of its wall with internal debris. This may represent a scrotal abscess. Clinical correlation is necessary.   Ectasia of the rete testes on the right.   3 mm cyst right epididymal head.   No epididymal orchitis.   MACRO: None.   Signed by: Julieta Callahan 6/14/2024 4:59 PM Dictation workstation:   [id number]    XR chest 2 views    Result Date: 6/14/2024  Interpreted By:  Josh Soto, STUDY: XR CHEST 2 VIEWS;  6/14/2024 3:10 pm   INDICATION: Signs/Symptoms:Cough.   COMPARISON: 05/28/2024   ACCESSION NUMBER(S): [id number]   ORDERING CLINICIAN: WES ORTIZ   FINDINGS: Pacemaker leads are intact and properly positioned.       CARDIOMEDIASTINAL SILHOUETTE: Cardiomediastinal silhouette is normal in size and configuration.   LUNGS: There is complete opacification left hemithorax with volume loss and shift of the mediastinum to the left.   Since the prior examination further increase in density of the right lung base which may suggest atelectasis or infiltrate. Correlation with auscultation and inflammatory markers recommended. A small to moderate on right pleural effusion persists.   ABDOMEN: No remarkable upper abdominal findings.   BONES: No acute osseous changes.       1.  Complete opacification with volume loss left hemithorax. Increased density right lung base reflecting small to moderate right pleural effusion. Superimposed pneumonia can not be excluded.       MACRO: None   Signed by: Josh Soto 6/14/2024 3:36 PM Dictation workstation:   [id number]    CT abdomen pelvis wo IV contrast    Result Date: 6/14/2024  Interpreted By:  Schoenberger, Joseph, STUDY: CT ABDOMEN PELVIS WO IV  CONTRAST;  6/14/2024 2:55 pm   INDICATION: Signs/Symptoms:Scrotal swelling, erythema, erythema extending up into the abdomen.   COMPARISON: 05/22/2024   ACCESSION NUMBER(S): [id number]   ORDERING CLINICIAN: WES ORTIZ   TECHNIQUE: CT of the abdomen and pelvis was performed. Contiguous axial images were obtained at 3 mm slice thickness through the abdomen and pelvis. Coronal and sagittal reconstructions at 3 mm slice thickness were performed.  No intravenous or oral contrast agents were administered.   FINDINGS: Please note that the evaluation of vessels, lymph nodes and organs is limited without intravenous contrast.   LOWER CHEST: There is some persistent volume loss in the left lower lobe with persistent consolidation. There are few air bronchograms. There is rounded collection of fluid at the anterior aspect of this process which may relate to either necrotizing pneumonia or loculated pleural fluid or conceivably pulmonary abscess. However this is also unchanged from the prior. There is a however, a new moderate dependent layering right pleural effusion.   ABDOMEN:   LIVER: 2 cysts located in the superior aspect the left liver lobe are stable from the prior. No other focal abnormality.   BILE DUCTS: No dilation   GALLBLADDER: Dependent layering calcified gallstones. No wall thickening or pericholecystic fluid.   PANCREAS: Unremarkable   SPLEEN: Unremarkable   ADRENAL GLANDS: Bilateral adrenal glands appear normal.   KIDNEYS AND URETERS: The kidneys are normal in size and unremarkable in appearance.  No hydroureteronephrosis or nephroureterolithiasis is identified. 5 mm nonobstructing lower pole right renal stone unchanged.   PELVIS:   BLADDER: High choose 1   REPRODUCTIVE ORGANS: Unremarkable   BOWEL: The stomach is unremarkable.  Small bowel loops are normal in caliber without mural thickening. Colon is normal in caliber. There is a mobile cecum. There are multiple colonic diverticula. There is no convincing  evidence for acute diverticulitis. Normal appendix.   VESSELS: There is no aneurysmal dilatation of the abdominal aorta. The IVC appears normal.   PERITONEUM/RETROPERITONEUM/LYMPH NODES: There is no free or loculated fluid collection, no free intraperitoneal air. The retroperitoneum appears normal.  No abdominopelvic lymphadenopathy is present. The   ABDOMINAL WALL: There is soft tissues Amanda in the abdominal wall which is new from the prior. This is a set metric Roseann more pronounced on the left than on the right. This extends into the lower abdominal left-sided pannus. It does not appear to overtly involve the scrotum. There may be loculated right-sided hydrocele.   BONES: No suspicious osseous lesions are identified. 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       1.  The high significant development of abdominal wall edema in the subcutaneous tissues asymmetrically worse on the left. This does not appear to especially extend into the scrotum. 2. There is a new moderate right pleural effusion. 3. Persistent left basal consolidation with anterior oval shaped collection of fluid with similar appearance to prior other than lack of air bronchograms on this exam compared to the prior. Associated volume loss.     MACRO: None   Signed by: Joseph Schoenberger 6/14/2024 3:30 PM Dictation workstation:   [id number]    US renal complete    Result Date: 6/8/2024  Interpreted By:  Ryan Best and Weaver Jakob STUDY: US RENAL COMPLETE;  6/6/2024 5:51 pm   INDICATION: Signs/Symptoms:VIK.   COMPARISON: None.   ACCESSION NUMBER(S): [id number]   ORDERING CLINICIAN: ADRYAN SIMON   TECHNIQUE: Multiple images of the kidneys were obtained.   FINDINGS: RIGHT KIDNEY: The right kidney measures 11.8 cm in length. The renal cortical echogenicity is increased. Renal cortical thickness is mildly decreased. No hydronephrosis is present; no evidence of nephrolithiasis.   LEFT KIDNEY: The  left kidney is poorly visualized, measuring approximately 11.8 cm. Renal cortical echogenicity is increased. No apparent hydronephrosis or nephrolithiasis.   BLADDER: The urinary bladder is unremarkable in appearance. Poorly visualized small volume free fluid in the left upper quadrant.       Examination is somewhat limited due to patient body habitus, positioning. Within this limitation: Increased renal cortical echogenicity bilaterally which may relate to medical renal disease. No hydronephrosis or nephrolithiasis.   I personally reviewed the images/study and I agree with the findings as stated. This study was interpreted at [facility], [place], [place].   MACRO: None   Signed by: Ryan Urena 6/8/2024 3:02 AM Dictation workstation:   [id number]    ECG 12 Lead    Result Date: 5/31/2024  Atrial flutter with variable AV block with premature ventricular or aberrantly conducted complexes Low voltage QRS Abnormal ECG When compared with ECG of 28-MAY-2024 01:40, Atrial flutter has replaced Atrial fibrillation Nonspecific T wave abnormality, improved in Inferior leads Confirmed by Jovanny Loera ([id number]) on 5/31/2024 1:51:33 PM    Flexible Sigmoidoscopy    Result Date: 5/31/2024  Table formatting from the original result was not included. Impression Dark red blood visualized in the rectum. Blood was cleared without any active bleeding appreciated nor underlying mucosal changes. Few small and large, scattered diverticula with no inflammation in the sigmoid colon; no bleeding was identified. Diverticula were irrigated without any bleeding or stigmata of recent bleed appreciated. All observed locations appeared normal, including the sigmoid colon, rectosigmoid and rectum. No mucosal abnormalities. Normal on retroflexion. Green bilious stool proximal to the rectum, most notably in the proximal descending colon and transverse colon, without any blood, blood clots or hematin.  Findings Dark red blood visualized in the rectum. Blood was cleared without any active bleeding appreciated nor underlying mucosal changes. Few small and large, scattered diverticula with no inflammation in the sigmoid colon; no bleeding was identified. Diverticula were irrigated without any bleeding or stigmata of recent bleed appreciated. All observed locations appeared normal, including the sigmoid colon, rectosigmoid and rectum. No mucosal abnormalities. Normal on retroflexion. Green bilious stool proximal to the rectum, most notably in the proximal descending colon and transverse colon, without any blood, blood clots or hematin. Recommendation  Follow up with primary gastroenterologist  Follow up with inpatient GI consult service, Dr. Valenzuela and Dr. 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 The patient's tolerance of previous anesthesia has been reviewed. The risks and benefits of the procedure and the sedation options and risks were discussed with the patient. All questions were answered and informed consent obtained. Details of the Procedure The patient underwent no sedation. The patient's blood pressure, ECG, heart rate, level of consciousness, oxygen and respirations were monitored throughout the procedure. A digital rectal exam was performed. A perianal exam was performed. The  scope was introduced through the anus and advanced to the transverse colon. Retroflexion was performed in the rectum. The quality of bowel preparation was evaluated using the [place] Bowel Preparation Scale with scores of: left colon = 3. Bowel prep was not adequate. The patient experienced no blood loss. The procedure was not difficult. The patient tolerated the procedure well. There were no apparent adverse events. Events Procedure Events Event Event Time ENDO SCOPE IN TIME 5/24/2024  1:08 PM ENDO SCOPE OUT TIME 5/24/2024  1:26 PM Specimens No specimens collected Procedure Location [facility] Intensive Care [address] [address] [facility] [address] [phone] Referring Provider Grayson Nichols, APRN- [address] [address] Procedure Provider Ale Archibald MD     Lower extremity venous duplex left    Result Date: 5/31/2024  Interpreted By:  Nathan Argueta and Weaver Jakob STUDY: [facility] US LOWER EXTREMITY VENOUS DUPLEX LEFT;  5/30/2024 3:58 pm   INDICATION: Signs/Symptoms:LLE swelling, eval for DVT.   COMPARISON: None.   ACCESSION NUMBER(S): [id number]   ORDERING CLINICIAN: ALEXANDRA AVILA   TECHNIQUE: Vascular ultrasound of the left lower extremity was performed. Real-time compression views as well as Gray scale, color Doppler and spectral Doppler waveform analysis was performed.   FINDINGS: Evaluation of the visualized portions of the left common femoral vein, proximal, mid, and distal femoral vein, and popliteal vein were performed.  Evaluation of the visualized portions of the  posterior tibial and peroneal veins were also performed. Evaluation of the calf veins limited due to edema.   Echogenic intraluminal material in the proximal greater saphenous vein with partial compressibility and color Doppler signal The evaluated veins demonstrate normal compressibility. There is intact venous flow demonstrating normal respiratory variability and  normal augmentation of flow with calf compression. Therefore, there is no ultrasonographic evidence for deep vein thrombosis within the evaluated veins.       1.  No sonographic evidence for deep vein thrombosis within the evaluated veins of the left lower extremity. 2. Nonocclusive thrombus of the proximal greater saphenous vein, a superficial vein. Recommend correlation with concern for superficial thrombophlebitis.   I personally reviewed the images/study and I agree with the findings as stated by Agustín Cheatham MD. This study was interpreted at [place], [place].   MACRO: None   Signed by: Nathan Argueta 5/31/2024 5:32 AM Dictation workstation:   [id number]    ECG 12 Lead    Result Date: 5/30/2024  Atrial flutter with variable AV block Rightward axis Low voltage QRS Nonspecific ST and T wave abnormality Abnormal ECG When compared with ECG of 16-MAY-2024 21:13, Significant changes have occurred Confirmed by Jovanny Loera ([id number]) on 5/30/2024 11:03:21 AM    US chest    Result Date: 5/29/2024  Interpreted By:  Dinh Plunkett, STUDY: US CHEST; 5/29/20243:53 pm   INDICATION: Signs/Symptoms:Thoracentesis (Left) diagnostic/therapeutic.   COMPARISON: None.   ACCESSION NUMBER(S): [id number]   ORDERING CLINICIAN: ALEXANDRA AVILA   TECHNIQUE: INTERVENTIONALIST(S): Dinh Plunkett   CONSENT: The patient/patient's POA/next of kin was informed of the nature of the proposed procedure. The purposes, alternatives, risks, and benefits were explained and discussed. All questions were answered and consent was obtained.   SEDATION: None   TIME OUT: A time out was performed immediately prior to procedure start with the interventional team, correctly identifying the patient name, date of birth, MRN, procedure, anatomy (including marking of site and side), patient position, procedure consent form, relevant laboratory and imaging test results, antibiotic administration, safety precautions,  and procedure-specific equipment needs.   FINDINGS: The patient was placed in the supine position.   The thoracic space was examined with grey scale ultrasound, and an absence of free fluid was demonstrated on ultrasound. Thoracic images were captured to demonstrate. A thoracentesis was not performed.       Absence of free fluid for procedure. A thoracentesis was not performed.   I personally performed and/or directly supervised this study and was present for the entire procedure.   Performed and dictated at [facility].   Signed by: Dinh Plunkett 5/29/2024 3:53 PM Dictation workstation:   [id number]    XR chest 1 view    Result Date: 5/28/2024  Interpreted By:  Jovany Nuñez, STUDY: XR CHEST 1 VIEW;  5/28/2024 8:03 am   INDICATION: Signs/Symptoms:Rule out infection.   COMPARISON: Chest radiograph dated 5/27/2024   ACCESSION NUMBER(S): [id number]   ORDERING CLINICIAN: ALEXANDRA AVILA   FINDINGS: AP radiograph of the chest   Pacemaker electrodes appear in adequate position. There is virtually complete opacification of the left thorax. The heart mediastinum are shifted to the left indicating volume loss. Compensatory hyperaeration of the right lung and pulmonary vascular shunting to the right lung is noted increased from previous study.         1. Virtually complete opacification of the left thorax with heart and mediastinum shifted to the left indicating volume loss 2. Compensatory pulmonary vascular shunting to the right lung       Signed by: Jovany Nuñez 5/28/2024 10:38 AM Dictation workstation:   [id number]    Electrocardiogram, 12-lead PRN ACS symptoms    Result Date: 5/28/2024  Atrial fibrillation with rapid ventricular response Nonspecific ST and T wave abnormality , probably digitalis effect Abnormal ECG When compared with ECG of 28-MAY-2024 01:39, (unconfirmed) No significant change was found Confirmed by Jovanny Loera ([id number]) on 5/28/2024 10:17:48 AM    FL modified  barium swallow study    Result Date: 5/26/2024  Interpreted By:  Sohail Torres  and Xavi Jerez STUDY: FL MODIFIED BARIUM SWALLOW STUDY;; 5/25/2024 9:06 am   INDICATION: Signs/Symptoms:r\o dysphagia.   COMPARISON: None.   ACCESSION NUMBER(S): [id number]   ORDERING CLINICIAN: ALEXANDRA AVILA   TECHNIQUE: MBSS completed. Informed verbal consent obtained prior to completion of exam. Trials of thin, nectar thick,  puree, and regular solids given. Fluoroscopy time :  2 minutes.   SLP: Meri Hurley MS CCC/SLP Phone/Pager: Epic Chat   SPEECH FINDINGS: Reason for referral: Further assessment of oropharyngeal swallow Patient hx: Recent RLL PNA. S/s of aspiration w/3 oz Swallow Protocol Respiratory status: Nasal cannula Previous diet: Reg/thin   FINAL SPEECH RECOMMENDATIONS   Diet recommendations/feeding strategies: Solid Diet Recommendations : Easy to Chew Liquid Diet Recommendations: Thin Medication Administration: Single w/ sips of water, whole in puree as needed   Safe Swallow Strategies/Guidelines: Only present PO intake when awake and alert Supervision/assist w/ PO intake to assure adherence to safe swallow strategies Upright positioning Slow rate/small boluses   Follow-up speech therapy recommended: Yes.   Short term goals: Pt will tolerate least restrictive diet with adequate oral phase and no s/s of aspiration. Date initiated: 5/25/24 Status: Goal initiated   Pt will adhere to safe swallow strategies during PO intake with > 90% accuracy independently. Date initiated: 5/25/24 Status: Goal initiated   Education provided: Yes.     Mechanics of the swallow summary: *Oral phase: Mild deficits.   Adequate bolus acceptance.  Prolonged mastication and bolus formation/transit w/ regular solids.  No oral residue.   *Pharyngeal phase: Mild deficits.   Incomplete epiglottic inversion, may be related to presence of NGT. Timely swallow initiation.  Adequate hyolaryngeal elevation/excursion.  Trace penetration x1 with  consecutive boluses of thin liquids related to incomplete airway protection.  Ejected upon swallow completion.  No other penetration and no aspiration with any other consistency assessed.  Mild residue at the valleculae following the swallow.  Largely cleared with liquid wash.  Mildly reduced distention of PES.     SLP impressions with severity rating: Mild oropharyngeal dysphagia characterized by prolonged mastication/bolus formation and mild residue at the valleculae following the swallow.   Thin Liquids (MBSS) Rosenbek's Penetration Aspiration Scale, Thin Liquids (MBSS): 2. PENETRATION that CLEARS - contrast enter airway, above vocal cords, no residue     Nectar Thick Liquids (MBSS) Rosenbek's Penetration Aspiration Scale, Nectar thick liquids (MBSS): 1. NO ASPIRATION & NO PENETRATION - no aspiration, contrast does not enter airway       Purees (MBSS) Rosenbek's Penetration Aspiration Scale, Purees (MBSS): 1. NO ASPIRATION & NO PENETRATION - no aspiration, contrast does not enter airway     Solids (MBSS) Rosenbek's Penetration Aspiration Scale, Solids (MBSS): 1. NO ASPIRATION & NO PENETRATION - no aspiration, contrast does not enter airway     Speech Therapy section of this report signed by Meri Hurley MS CCC/SLP on 5/25/2024 at 10:17 am.   RADIOLOGY FINDINGS: Nasoenteric tube is partially visualized. No evidence of acute osseous abnormality of the cervical spine. Patient is edentulous.   Radiology section of this report signed by Dr. Torres.       1. No radiographic evidence of acute process in the neck. 2. Please refer to speech pathology report for additional findings.   MACRO: None   Signed by: Sohail Torres 5/26/2024 3:51 PM Dictation workstation:   [id number]    XR abdomen 1 view    Result Date: 5/25/2024  Interpreted By:  Marcie Shah, STUDY: XR ABDOMEN 1 VIEW; 5/25/2024 5:45 pm   INDICATION: Signs/Symptoms:ngt placed.   COMPARISON: Radiograph dated 05/25/2024, 2:33 p.m.   ACCESSION  NUMBER(S): [id number]   ORDERING CLINICIAN: ALEXANDRA AVILA   FINDINGS: Single-view of the abdomen. The pelvis is not included. Enteric tube is in place with the tip projecting over the stomach. Positive contrast is identified in the gastric fundus. Prominent loops of bowel throughout the visualized upper abdomen. Nonobstructive bowel gas pattern.  Limited evaluation of pneumoperitoneum on supine imaging, however no gross evidence of free air is noted.   Extensive consolidation in visualized portion of the left lung.   Osseous structures demonstrate no acute bony changes.       1.  Enteric tube projecting over the proximal stomach. 2. Again seen multiple prominent loops of bowel throughout the upper abdomen. Correlation with ileus. Recommend follow-up radiograph.   Signed by: Marcie Rodriguez 5/25/2024 5:54 PM Dictation workstation:   [id number]    XR abdomen 1 view    Result Date: 5/25/2024  Interpreted By:  Marcie Shah, STUDY: XR ABDOMEN 1 VIEW; 5/25/2024 3:06 pm   INDICATION: Signs/Symptoms:distended abdomen, assess for dilated bowel loops.   COMPARISON: Radiograph dated 05/21/2024   ACCESSION NUMBER(S): [id number]   ORDERING CLINICIAN: ALEXANDRA AVILA   FINDINGS: Views of the abdomen and pelvis. What is presumed to be enteric tube is projecting over the lower mediastinum. Positive contrast is identified in the expected location of the stomach. There is also positive contrast throughout the loops of bowel in the lower abdomen. Prominent loops of colon. Limited evaluation of pneumoperitoneum on supine imaging, however no gross evidence of free air is noted.   Consolidative opacities in left lung.   Osseous structures demonstrate no acute bony changes.       1.  Prominent loops of colon which can be due to ileus. Recommend follow-up radiograph.   Signed by: Marcie Rodriguez 5/25/2024 5:33 PM Dictation workstation:   [id number]    XR chest 1 view    Result Date: 5/23/2024  Interpreted By:  Field  Rachael Manzo STUDY: XR CHEST 1 VIEW; ;  5/22/2024 6:45 pm   INDICATION: Signs/Symptoms:Worsening tachypnea, confusion.   COMPARISON: Chest radiograph on 05/21/2024.   ACCESSION NUMBER(S): [id number]   ORDERING CLINICIAN: SUN CHAMPAGNE   FINDINGS: Single AP view of the chest.   Right subclavian vein approach pacemaker/AICD in place with leads projecting over the right atrium and ventricle. Enteric tube coursing below diaphragm tip overlying the expected position of the gastric body.   The cardiomediastinal silhouette is obscured, similar to prior exam.   Persistent near-complete opacification of the left hemithorax, with mild interval increase in air bronchograms within the left upper lung zone. Redemonstration of patchy right infrahilar airspace opacities and interstitial prominence on the right. No right-sided pleural effusion or pneumothorax.   No acute osseous abnormality.       1. Persistent near-complete opacification of the left hemithorax, with mild interval increase in air bronchograms within the left upper lung zone. 2. Persistent patchy right infrahilar airspace opacities and interstitial prominence throughout the right lung, which may represent aspiration/pneumonia in the appropriate clinical setting. 3. Medical devices as above.   I personally reviewed the images/study and I agree with the findings as stated by Dr. Raymond Armstrong M.D. This study was interpreted at [facility], [place], [place].   MACRO: None   Signed by: Jovany Nuñez 5/23/2024 7:32 AM Dictation workstation:   [id number]    CT chest abdomen pelvis wo IV contrast    Result Date: 5/23/2024  Interpreted By:  Sohail Torres,  and Ogievich Taessa STUDY: CT CHEST ABDOMEN PELVIS WO CONTRAST;  5/22/2024 12:15 pm   INDICATION: Signs/Symptoms:c/f sepsis.   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 N/V/D with coffe [employer] concernign for UPI GIB and ongoing fevers.   COMPARISON: CT chest/abdomen/pelvis 05/18/2024   ACCESSION NUMBER(S): [id number]   ORDERING CLINICIAN: SUN CHAMPAGNE   TECHNIQUE: CT of the chest, abdomen and pelvis was performed. Contiguous axial images were obtained at 3 mm slice thickness through the chest, abdomen and pelvis. Coronal and sagittal reconstructions at 3 mm slice thickness were performed.  No intravenous contrast was administered; positive oral contrast was given.   FINDINGS: Please note that the study is limited without intravenous contrast.   Respiratory motion artifact.   CHEST:   LUNG/PLEURA/LARGE AIRWAYS: Trachea and bilateral mainstem bronchi are patent.   Again seen complete opacification of the left hemithorax with air bronchograms consistent with significant collapse of the left lung.   There is left pleural thickening (example series 2, image 38) with a focus of left posterior chest wall extension at the level of the 9th-10th (series 2, image 71) and 10th-11th (series 2, image 82) rib spaces, similar to prior, findings consistent with patient's known mesothelioma.   Similar small loculated pleural effusion measuring 7.1 x 4.1 x 2.3 cm (series 2, image 66 and series 900, image 62).   Interval improvement of right lower lobe ill-defined patchy infiltrate (series 4, image 143). Slight interval increase in right trace pleural effusion. No new focal consolidations. No evidence of pneumothorax.   VESSELS: The previously noted ill-defined hypodensity in the left pulmonary artery is not well evaluated on this exam due to beam hardening artifact, positioning of patient's arms, and lack of venous contrast.   Dilated main pulmonary artery measuring 3.8 cm (series 2, image 40), similar to prior. Ectatic ascending aorta measuring 4.1 cm, similar to prior.  Mild to moderate coronary artery calcifications are present.   HEART: The heart is normal in size.  Trace  pericardial fluid, similar to prior. Right-sided subclavian approach dual-chambercardiac ICD/pacemaker, with the leads in the right atrium and right ventricle.   MEDIASTINUM AND ANDREA: Multiple prominent mediastinal and perihilar lymph nodes similar to prior. For example:   1.3 cm subcarinal lymph node (series 2, image 48).   1 cm perihilar lymph node (series 2, image 33).   Enteric tube courses through the esophagus and terminates in the body of the stomach. Otherwise otherwise esophagus is within normal limits.   CHEST WALL AND LOWER NECK: Right chest wall cardiac pacemaker as described above. The visualized thyroid gland appears within normal limits.   ABDOMEN:   LIVER: The liver is normal in size. Similar hypoattenuating lesions with the largest measuring 2 cm segment 4A (series 2, image 71).   BILE DUCTS: The bile ducts are not dilated.   GALLBLADDER: The gallbladder is not distended. Calcified stones are noted.   PANCREAS: The pancreas appears unremarkable.   SPLEEN: Within normal limits.   ADRENAL GLANDS: Bilateral adrenal glands appear normal.   KIDNEYS AND URETERS: Bilateral mildly atrophic kidneys with mild perinephric stranding similar to prior. There is a 0.6 cm left midpole nonobstructing renal calculi. No hydroureteronephrosis. No right nephroureterolithiasis.   PELVIS:   BLADDER: The urinary bladder is underdistended with Forrest catheter in place. There is air in the urinary bladder likely from the Forrest.   REPRODUCTIVE ORGANS: No pelvic masses.   BOWEL: NG tube body of the stomach. Otherwise, the stomach is unremarkable. The small is normal in caliber and demonstrate no wall thickening. Proximal sigmoid diverticulosis with interval improvement in the mild wall thickening and adjacent fat stranding involving the proximal sigmoid colon (series 902, image 93). Interval insertion of rectal tube.   VESSELS: Mild atherosclerosis of the abdominal aorta and its branching vessels. There is no aneurysmal  dilatation of the abdominal aorta..   Interval flattening of the inferior vena cava (image 2, image 121)/   PERITONEUM/RETROPERITONEUM/LYMPH NODES: No ascites or free air, no fluid collection.  The retroperitoneum appears unremarkable.  No enlarged mesenteric lymph nodes.   ABDOMINAL WALL: Within normal limits.   BONES: No suspicious osseous lesions are present. Degenerative discogenic disease is noted in the lower thoracic and lumbar spine most severe at L3-L4, L4-L5, and L5-L6..       Chest 1. Interval improvement of the right lower lobe patchy infiltrate. 2. Again seen left lung collapse, left pleural thickening with invasion into the left posterior chest wall, and small left loculated pleural effusion consistent with patient's known mesothelioma and is unchanged when compared to prior CT from 05/18/2024. 3. Unchanged prominent mediastinal and perihilar lymph nodes. 4. Previously noted left pulmonary artery thrombus is not well evaluated on this exam due to beam hardening artifact, positioning of patient's arms, and lack of intravenous contrast.   Abdomen-Pelvis 1. Interval flattening of the inferior vena cava which may be seen with hypovolemia. Recommend correlation with patient's fluid volume status. 2. Improved proximal sigmoid diverticulitis. 3. Additional unchanged findings as noted above.   I personally reviewed the images/study and I agree with the findings as stated by Patrice Aguilar DO, PGY-2. This study was interpreted at [facility], [place], [place].   MACRO: None   Signed by: Sohail Torres 5/23/2024 12:22 AM Dictation workstation:   [id number]    Transthoracic Echo (TTE) Complete    Result Date: 5/21/2024   [facility], [address] [place] [address]                Tel [phone] and Fax [phone] TRANSTHORACIC ECHOCARDIOGRAM REPORT  Patient Name:      ZAKIYA LAWSON       Reading Physician:    [id number] Edmondson                                                                Angela WALKER Study Date:        5/21/2024            Ordering Provider:    [id number] SUN CHAMPAGNE MRN/PID:           [id number]             Fellow: Accession#:        [id number]         Nurse: Date of Birth/Age: 1935 / 88 years Sonographer:          Dionisio Braga                                                               RDCS Gender:            M                    Additional Staff: Height:            172.72 cm            Admit Date: Weight:            109.77 kg            Admission Status:     Inpatient -                                                               Routine BSA / BMI:         2.22 m2 / 36.80      Encounter#:           [id number]                    kg/m2                                         Department Location:  [place] [address] Blood Pressure: 96 /57 mmHg Study Type:    TRANSTHORACIC ECHO (TTE) COMPLETE Diagnosis/ICD: Unspecified atrial fibrillation-I48.91; Hypertensive heart                disease with heart failure-I11.0 Indication:    hypoxic resp failure w/ new o2 requirement c/f HF CPT Code:      Echo Complete w Full Doppler-[id number] Patient History: Pertinent History: PAD, afib/flutter, hx DVT on warfarin, HTN, CAD s/p stent,                    mitral regurg, HLD,JOVANNI, basal cell ca on face, s/p removal                    and radiation of malignant mesothelioma, S/p L VATS with                    decort 5/2022. Study Detail: The following Echo studies were performed: 2D, M-Mode, color flow               and Doppler. Technically challenging study due to body habitus,               patient lying in supine position, poor acoustic windows and               prominent lung artifact. Definity used as a contrast agent for               endocardial border definition. Total contrast used for this               procedure was 2.0 mL via IV push. Unable to obtain suprasternal               notch view.   PHYSICIAN INTERPRETATION: Left Ventricle: The left ventricular systolic function is hyperdynamic, with an estimated ejection fraction of 70-75%. The patient is in atrial fibrillation which may influence the estimate of left ventricular function and transvalvular flows. There are no regional wall motion abnormalities. The left ventricular cavity size is normal. Left ventricular diastolic filling was not assessed. Left Atrium: The left atrium was not well visualized. Right Ventricle: The right ventricle was not well visualized. Unable to determine right ventricular systolic function. Right Atrium: The right atrium was not well visualized. Aortic Valve: The aortic valve is trileaflet. There is trivial aortic valve regurgitation. The peak instantaneous gradient of the aortic valve is 13.2 mmHg. Mitral Valve: The mitral valve is normal in structure. There is mild mitral annular calcification. There is trace mitral valve regurgitation. Tricuspid Valve: The tricuspid valve was not well visualized. Tricuspid regurgitation was not well visualized. Tricuspid regurgitation was not assessed. The Doppler estimated RVSP is mildly elevated at 36.6 mmHg. Pulmonic Valve: The pulmonic valve is not well visualized. There is physiologic pulmonic valve regurgitation. Pericardium: There is a trivial pericardial effusion. Aorta: The aortic root is abnormal. The aortic root appears moderately dilated and measures 4.50 cm. The Asc Ao is 3.70 cm. There is mild dilatation of the ascending aorta. Systemic Veins: The inferior vena cava appears to be of normal size. There is IVC inspiratory collapse greater than 50%. In comparison to the previous echocardiogram(s): Compared with study from 5/3/2022, the previous study was a MARY during a MARY/DCCV and comparison is limited.  CONCLUSIONS:  1. Poorly visualized anatomical structures due to suboptimal image quality.  2. Left ventricular systolic function is hyperdynamic with a 70-75% estimated  ejection fraction.  3. The patient is in atrial fibrillation which may influence the estimate of left ventricular function and transvalvular flows.  4. Mildly elevated RVSP.  5. Moderately dilated aortic root. QUANTITATIVE DATA SUMMARY: 2D MEASUREMENTS:                          Normal Ranges: Ao Root d:     4.50 cm   (2.0-3.7cm) LAs:           2.50 cm   (2.7-4.0cm) IVSd:          0.80 cm   (0.6-1.1cm) LVPWd:         0.80 cm   (0.6-1.1cm) LVIDd:         4.10 cm   (3.9-5.9cm) LVIDs:         2.20 cm LV Mass Index: 43.9 g/m2 LV % FS        46.3 % AORTA MEASUREMENTS:                    Normal Ranges: Asc Ao, d: 3.70 cm (2.1-3.4cm) LV SYSTOLIC FUNCTION BY 2D PLANIMETRY (MOD):                     Normal Ranges: EF-A4C View: 73.6 % (>=55%) EF-A2C View: 80.4 % EF-Biplane:  78.8 % LV DIASTOLIC FUNCTION:                     Normal Ranges: MV Peak E: 1.09 m/s (0.7-1.2 m/s) MV DT:     245 msec (150-240 msec) MITRAL VALVE:                 Normal Ranges: MV DT: 245 msec (150-240msec) AORTIC VALVE:                          Normal Ranges: AoV Vmax:      1.82 m/s  (<=1.7m/s) AoV Peak P.2 mmHg (<20mmHg) LVOT Max Philipp:  1.42 m/s  (<=1.1m/s) LVOT VTI:      18.80 cm LVOT Diameter: 2.00 cm   (1.8-2.4cm) AoV Area,Vmax: 2.45 cm2  (2.5-4.5cm2)  RIGHT VENTRICLE: TAPSE: 15.8 mm RV s'  0.18 m/s TRICUSPID VALVE/RVSP:                             Normal Ranges: Peak TR Velocity: 2.90 m/s RV Syst Pressure: 36.6 mmHg (< 30mmHg) PULMONIC VALVE:                      Normal Ranges: PV Max Philipp: 0.9 m/s  (0.6-0.9m/s) PV Max PG:  3.2 mmHg  [id number] Delvis Miller MD Electronically signed on 2024 at 5:46:15 PM  ** Final **     XR abdomen 1 view    Result Date: 2024  Interpreted By:  Jovany Nuñez, STUDY: XR ABDOMEN 1 VIEW;  2024 12:58 pm   INDICATION: Signs/Symptoms:post ngt.   COMPARISON: One-view abdomen 2024 13 a.m.   ACCESSION NUMBER(S): [id number]   ORDERING CLINICIAN: SUN CHAMPAGNE   FINDINGS: One-view abdomen   An enteric  tube is positioned within the region of the gastric fundus several cm below the expected gastroesophageal junction. There is a predominantly fluid-filled small bowel and colon pattern. There is less gastric distention with air.   Opacification of the left thorax and multiple air bronchograms are noted.       1.  The enteric tube is positioned within the gastric fundus 2. Advancement of the tube would be recommended for more optimal positioning 3. Predominantly fluid-filled small bowel and colon pattern 4. Opacification of the visualized left thorax and multiple air bronchograms within the left lung   MACRO: None   Signed by: Jovany Nuñez 5/21/2024 2:19 PM Dictation workstation:   [id number]    XR abdomen 1 view    Result Date: 5/21/2024  Interpreted By:  Jovany Nuñez, STUDY: XR ABDOMEN 1 VIEW;  5/21/2024 11:13 am   INDICATION: Signs/Symptoms:ABD DISTENTION.   COMPARISON: None.   ACCESSION NUMBER(S): [id number]   ORDERING CLINICIAN: SUN CHAMPAGNE   FINDINGS: The stomach is distended with air. There is a predominantly fluid-filled small bowel and colon pattern. Gas is demonstrated within the colon and distal ileum. The left ventricular pacemaker electrode appears in adequate position. The left ventricle appears mildly enlarged. Osseous and articular anatomy is normal for age.       1.  The stomach is moderately distended with air otherwise, nonspecific predominantly fluid-filled bowel pattern   MACRO: None   Signed by: Jovany Nuñez 5/21/2024 2:17 PM Dictation workstation:   [id number]    XR chest 1 view    Result Date: 5/21/2024  Interpreted By:  Jovany Nuñez,  and Guicho Castro STUDY: XR CHEST 1 VIEW;  5/21/2024 6:14 am   INDICATION: Signs/Symptoms:SOB.   COMPARISON: Chest radiograph 05/16/2024 CT chest 05/18/2024   ACCESSION NUMBER(S): [id number]   ORDERING CLINICIAN: SUN CHAMPAGNE   FINDINGS: AP radiograph of the chest was provided.   Right subclavian vein approach pacemaker/AICD in place with leads  projecting over the right atrium and right ventricle.   CARDIOMEDIASTINAL SILHOUETTE: Cardiomediastinal silhouette is stable in size with complete obscuration of the left cardiomediastinal border.   LUNGS: Persistent near-complete opacification of the left hemithorax with interval reduced air bronchograms compared to prior. Persistent patchy right infrahilar airspace opacities are noted. Prominent perihilar and interstitial lung markings are noted on the right. No consolidation, effusion, or pneumothorax on the right.   ABDOMEN: No remarkable upper abdominal findings.   BONES: No acute osseous changes.       1. Virtually complete opacification of the left hemithorax with interval reduced air bronchograms. 2. Persistent patchy right infrahilar airspace opacities which may reflect aspiration changes and/or pneumonia in the appropriate clinical setting.   I personally reviewed the images/study and I agree with the findings as stated by Matthew Lindo MD. This study was interpreted at [place], [place].   MACRO: None   Signed by: Jovany Nuñez 5/21/2024 11:26 AM Dictation workstation:   [id number]    XR abdomen 1 view    Result Date: 5/20/2024  Interpreted By:  Sofya Ahumada, STUDY: XR ABDOMEN 1 VIEW;  5/19/2024 10:42 pm   INDICATION: Signs/Symptoms:abdominal distension.   COMPARISON: CT: 05/18/2024   ACCESSION NUMBER(S): [id number]   ORDERING CLINICIAN: SUN CHAMPAGNE   FINDINGS: Nonobstructive bowel gas pattern. Limited evaluation of pneumoperitoneum on supine imaging, however no gross evidence of free air is noted.   Whiteout throughout the left hemithorax.   Osseous structures demonstrate no acute bony changes.       1.  Nonspecific and nonobstructive gas pattern.   MACRO: None   Signed by: Sofya Ahumada 5/20/2024 8:47 AM Dictation workstation:   [id number]       Encounter Date: 06/14/24   ECG 12 lead   Result Value    Ventricular Rate 126    Atrial Rate 129     QRS Duration 78    QT Interval 324    QTC Calculation(Bazett) 469    R Axis 81    T Axis 42    QRS Count 20    Q Onset 229    T Offset 391    QTC Fredericia 415    Narrative    Accelerated Junctional rhythm  Low voltage QRS  Nonspecific ST abnormality  Abnormal ECG  When compared with ECG of 30-MAY-2024 08:33,  Junctional rhythm has replaced Atrial flutter    See ED provider note for full interpretation and clinical correlation    Confirmed by Chavo Cid ([id number]) on 6/15/2024 11:11:58 AM        Tele Monitoring: Atrial fibrillation with heart rate 60 to 110 bpm    Assessment     Patient Active Problem List   Diagnosis    Arthritis    Atherosclerosis of native artery of both lower extremities with intermittent claudication (CMS-HCC)    Atrial fibrillation with RVR (Multi)    Atrial flutter (Multi)    Sinus node dysfunction (Multi)    Benign essential hypertension    Bradycardia    CAD (coronary atherosclerotic disease)    Chest pain    Difficulty breathing    Excessive daytime sleepiness    Fracture of lateral malleolus of right ankle    H/O malignant mesothelioma    Intermittent claudication (CMS-HCC)    Knee pain, right    Mesothelioma, malignant (Multi)    Mitral regurgitation    Mixed hyperlipidemia    Pacemaker    Pain and swelling of left lower extremity    Knee osteoarthritis    Patellofemoral arthritis    Pneumothorax    Polycythemia vera (Multi)    Sleep apnea    Symptomatic varicose veins of left lower extremity    Venous insufficiency    Class 2 obesity with body mass index (BMI) of 38.0 to 38.9 in adult    Long term (current) use of anticoagulants    Hx-TIA (transient ischemic attack)    Chronic pain of left knee    Trigger finger, right ring finger    Former smoker    BMI 38.0-38.9,adult    COVID    Generalized weakness    Acute on chronic respiratory failure with hypoxia (Multi)    Hx of malignant mesothelioma    Impaired mobility and ADLs    Acute diverticulitis    Sepsis (Multi)    Sepsis, due to  unspecified organism, unspecified whether acute organ dysfunction present (Multi)    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Multi)    Anemia    Cellulitis of scrotum    Recurrent pleural effusion    Abdominal wall cellulitis   Clinical impression:  1.  Paroxysmal atrial fibrillation on rate control with metoprolol anticoagulation on hold secondary to GI bleed/anemia  2.  Scrotal/abdominal wall cellulitis  3.  Anemia  4.  Progressive mesothelioma  5.  Sinus node dysfunction with remote dual-chamber permanent pacemaker implant 6.  Coronary artery disease with remote PTCA  7.  Hypertension  8.  Dyslipidemia  9.  Polycythemia vera followed by Dr. Rothman  Plan:  Continue with rate control strategy with beta-blocker  Hold anticoagulation secondary to anemia  Supplemental potassium to keep K over 4 and supplemental magnesium to keep mag over 2  Primary service to discuss hospice evaluation with patient and family          Electronically signed by ALONDRA Flores on 6/17/2024 at 1:26 PM       Patient was seen, chart reviewed.    Maintaining atrial fibrillation, rates controlled with beta-blocker therapy and calcium channel blocker.  Anticoagulation therapy placed on hold due to significant anemia  Continue with telemetry  Possibility of hospice to be discussed with patient and family members    Chavo Cid MD

## 2024-06-18 LAB
ANION GAP SERPL CALC-SCNC: 13 MMOL/L (ref 10–20)
BASOPHILS # BLD AUTO: 0.01 X10*3/UL (ref 0–0.1)
BASOPHILS NFR BLD AUTO: 0.2 %
BUN SERPL-MCNC: 47 MG/DL (ref 6–23)
CALCIUM SERPL-MCNC: 8.1 MG/DL (ref 8.6–10.3)
CHLORIDE SERPL-SCNC: 110 MMOL/L (ref 98–107)
CO2 SERPL-SCNC: 22 MMOL/L (ref 21–32)
CREAT SERPL-MCNC: 3.62 MG/DL (ref 0.5–1.3)
EGFRCR SERPLBLD CKD-EPI 2021: 16 ML/MIN/1.73M*2
EOSINOPHIL # BLD AUTO: 0.09 X10*3/UL (ref 0–0.4)
EOSINOPHIL NFR BLD AUTO: 2 %
ERYTHROCYTE [DISTWIDTH] IN BLOOD BY AUTOMATED COUNT: 16.4 % (ref 11.5–14.5)
GLUCOSE SERPL-MCNC: 84 MG/DL (ref 74–99)
HCT VFR BLD AUTO: 23.3 % (ref 41–52)
HGB BLD-MCNC: 7.1 G/DL (ref 13.5–17.5)
HOLD SPECIMEN: NORMAL
IMM GRANULOCYTES # BLD AUTO: 0.08 X10*3/UL (ref 0–0.5)
IMM GRANULOCYTES NFR BLD AUTO: 1.8 % (ref 0–0.9)
LYMPHOCYTES # BLD AUTO: 0.43 X10*3/UL (ref 0.8–3)
LYMPHOCYTES NFR BLD AUTO: 9.7 %
MCH RBC QN AUTO: 29 PG (ref 26–34)
MCHC RBC AUTO-ENTMCNC: 30.5 G/DL (ref 32–36)
MCV RBC AUTO: 95 FL (ref 80–100)
MONOCYTES # BLD AUTO: 0.9 X10*3/UL (ref 0.05–0.8)
MONOCYTES NFR BLD AUTO: 20.2 %
NEUTROPHILS # BLD AUTO: 2.94 X10*3/UL (ref 1.6–5.5)
NEUTROPHILS NFR BLD AUTO: 66.1 %
NRBC BLD-RTO: 0 /100 WBCS (ref 0–0)
PLATELET # BLD AUTO: 150 X10*3/UL (ref 150–450)
POTASSIUM SERPL-SCNC: 4.8 MMOL/L (ref 3.5–5.3)
RBC # BLD AUTO: 2.45 X10*6/UL (ref 4.5–5.9)
SODIUM SERPL-SCNC: 140 MMOL/L (ref 136–145)
VANCOMYCIN SERPL-MCNC: 14.2 UG/ML (ref 5–20)
WBC # BLD AUTO: 4.5 X10*3/UL (ref 4.4–11.3)

## 2024-06-18 PROCEDURE — C9113 INJ PANTOPRAZOLE SODIUM, VIA: HCPCS | Performed by: INTERNAL MEDICINE

## 2024-06-18 PROCEDURE — 2500000004 HC RX 250 GENERAL PHARMACY W/ HCPCS (ALT 636 FOR OP/ED): Performed by: INTERNAL MEDICINE

## 2024-06-18 PROCEDURE — 99233 SBSQ HOSP IP/OBS HIGH 50: CPT | Performed by: STUDENT IN AN ORGANIZED HEALTH CARE EDUCATION/TRAINING PROGRAM

## 2024-06-18 PROCEDURE — 2500000004 HC RX 250 GENERAL PHARMACY W/ HCPCS (ALT 636 FOR OP/ED): Performed by: PHARMACIST

## 2024-06-18 PROCEDURE — 2500000001 HC RX 250 WO HCPCS SELF ADMINISTERED DRUGS (ALT 637 FOR MEDICARE OP): Performed by: INTERNAL MEDICINE

## 2024-06-18 PROCEDURE — 36415 COLL VENOUS BLD VENIPUNCTURE: CPT | Performed by: INTERNAL MEDICINE

## 2024-06-18 PROCEDURE — 1200000002 HC GENERAL ROOM WITH TELEMETRY DAILY

## 2024-06-18 PROCEDURE — 2500000005 HC RX 250 GENERAL PHARMACY W/O HCPCS

## 2024-06-18 PROCEDURE — 80202 ASSAY OF VANCOMYCIN: CPT | Performed by: PHARMACIST

## 2024-06-18 PROCEDURE — 85025 COMPLETE CBC W/AUTO DIFF WBC: CPT | Performed by: INTERNAL MEDICINE

## 2024-06-18 PROCEDURE — 2500000002 HC RX 250 W HCPCS SELF ADMINISTERED DRUGS (ALT 637 FOR MEDICARE OP, ALT 636 FOR OP/ED): Mod: MUE | Performed by: INTERNAL MEDICINE

## 2024-06-18 PROCEDURE — 80048 BASIC METABOLIC PNL TOTAL CA: CPT | Performed by: INTERNAL MEDICINE

## 2024-06-18 RX ORDER — METOPROLOL TARTRATE 1 MG/ML
5 INJECTION, SOLUTION INTRAVENOUS ONCE
Status: COMPLETED | OUTPATIENT
Start: 2024-06-18 | End: 2024-06-18

## 2024-06-18 RX ORDER — METOPROLOL TARTRATE 1 MG/ML
INJECTION, SOLUTION INTRAVENOUS
Status: COMPLETED
Start: 2024-06-18 | End: 2024-06-18

## 2024-06-18 ASSESSMENT — COGNITIVE AND FUNCTIONAL STATUS - GENERAL
EATING MEALS: TOTAL
DRESSING REGULAR UPPER BODY CLOTHING: TOTAL
MOVING FROM LYING ON BACK TO SITTING ON SIDE OF FLAT BED WITH BEDRAILS: TOTAL
PERSONAL GROOMING: TOTAL
CLIMB 3 TO 5 STEPS WITH RAILING: TOTAL
TURNING FROM BACK TO SIDE WHILE IN FLAT BAD: TOTAL
PERSONAL GROOMING: TOTAL
MOBILITY SCORE: 6
STANDING UP FROM CHAIR USING ARMS: TOTAL
DRESSING REGULAR UPPER BODY CLOTHING: TOTAL
TOILETING: TOTAL
WALKING IN HOSPITAL ROOM: TOTAL
HELP NEEDED FOR BATHING: TOTAL
DRESSING REGULAR LOWER BODY CLOTHING: TOTAL
DAILY ACTIVITIY SCORE: 6
TURNING FROM BACK TO SIDE WHILE IN FLAT BAD: TOTAL
TOILETING: TOTAL
STANDING UP FROM CHAIR USING ARMS: TOTAL
CLIMB 3 TO 5 STEPS WITH RAILING: TOTAL
MOBILITY SCORE: 6
DAILY ACTIVITIY SCORE: 7
HELP NEEDED FOR BATHING: TOTAL
MOVING TO AND FROM BED TO CHAIR: TOTAL
WALKING IN HOSPITAL ROOM: TOTAL
MOVING FROM LYING ON BACK TO SITTING ON SIDE OF FLAT BED WITH BEDRAILS: TOTAL
MOVING TO AND FROM BED TO CHAIR: TOTAL
EATING MEALS: A LOT
DRESSING REGULAR LOWER BODY CLOTHING: TOTAL

## 2024-06-18 ASSESSMENT — PAIN - FUNCTIONAL ASSESSMENT: PAIN_FUNCTIONAL_ASSESSMENT: 0-10

## 2024-06-18 ASSESSMENT — PAIN SCALES - GENERAL: PAINLEVEL_OUTOF10: 0 - NO PAIN

## 2024-06-18 NOTE — PROGRESS NOTES
Family met with hospice this afternoon, they did sign on, they will deliver equipment later today and tomorrow, patient should be ready for discharge once equipments is set up in home tomorrow. Provided family with brochures for home care agencies for aide service, private pay. Will continue to follow for further discharge needs.

## 2024-06-18 NOTE — PROGRESS NOTES
"Vinicius Arriola is a 89 y.o. male on day 4 of admission presenting with Cellulitis of scrotum.    Subjective   Patient seen and examined.  He appears comfortable but says that he is not happy with his life and he wants to go home.  Not complaining of any pain denies any trouble breathing.  No nausea or vomiting.       Objective     Physical Exam  HEENT: NCAT, PERRLA, EOMI, moist mucous membranes  NECK: supple, no masses  CV: irregularly irregular, no m/r/g, no LE edema  LUNGS: diminished bilaterally, rhonchi,   ABD: soft, NT, ND, NBS  SKIN: L abdominal wall erythema, improving,   : L scrotal wall cellulitis,   MSK; no gross deformities, normal joints; pitting edema bilaterally  NEURO: A+Ox3, no FND  Last Recorded Vitals  Blood pressure 102/58, pulse 78, temperature 37.2 °C (99 °F), resp. rate 18, height 1.727 m (5' 8\"), weight 118 kg (260 lb), SpO2 96%.  Intake/Output last 3 Shifts:  I/O last 3 completed shifts:  In: 1092.3 (9.3 mL/kg) [P.O.:180; I.V.:312.3 (2.6 mL/kg); IV Piggyback:600]  Out: 1750 (14.8 mL/kg) [Urine:1750 (0.4 mL/kg/hr)]  Weight: 117.9 kg     Relevant Results                             Assessment/Plan   Principal Problem:    Cellulitis of scrotum  Active Problems:    Atrial fibrillation with RVR (Multi)    Benign essential hypertension    Acute on chronic respiratory failure with hypoxia (Multi)    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Multi)    Recurrent pleural effusion    Abdominal wall cellulitis    A cellulitis/abscess seems to be improving  Nonsurgical as per urology  Does have persistent white count and worsening creatinine  Continue to monitor  He does have poor prognosis due to his multiple comorbidities including progressive mesothelioma  Family is thinking towards comfort care  Hospice has been consulted they will meet up with hospice later today  Plan to discharge home with hospice  Continue current IV antibiotics while he is in the hospital can be switched to p.o. once being discharged with hospice  Anemia " is multifactorial, continue to monitor hemoglobin, will transfuse only if he becomes symptomatic   Off Dilt infusion, on oral metoprolol  Off Lovenox will most likely discontinue any anticoagulation at discharge  DVT prophylaxis          Abraham Pugh MD

## 2024-06-18 NOTE — PROGRESS NOTES
"Vinicius Arriola is a 89 y.o. male on day 4 of admission presenting with Cellulitis of scrotum.6/17/2024 and 6/18/2024 patient is significant comorbid conditions and mesothelioma advanced.  Patient and family agreed for hospice.       Subjective   Patient DNRCC plan hospice tomorrow at home.     Objective   Patient not in acute distress intermittent coughing noted.  ROS  Review of Systems   Patient has intermittent coughing, short of breath with any movement.  Vital Signs  Blood pressure 102/56, pulse (!) 128, temperature 37.3 °C (99.1 °F), temperature source Temporal, resp. rate 18, height 1.727 m (5' 8\"), weight 118 kg (260 lb), SpO2 97%.    Physical Exam  Physical Exam   Patient vitals reviewed heart rate fast, no fever, respiratory stable, oxygen saturation 97%.  Decreased breath sound both lungs more decreased on the left side.  Other physical exam unchanged  Oxygen Therapy  SpO2: 97 %  Medical Gas Therapy: Supplemental oxygen  O2 Delivery Method: Nasal cannula       Intake/Output  I/O last 3 completed shifts:  In: 1092.3 (9.3 mL/kg) [P.O.:180; I.V.:312.3 (2.6 mL/kg); IV Piggyback:600]  Out: 1750 (14.8 mL/kg) [Urine:1750 (0.4 mL/kg/hr)]  Weight: 117.9 kg     Lines and Tubes:  Peripheral IV 06/14/24 20 G Right Antecubital (Active)   Placement Date/Time: 06/14/24 2148   Hand Hygiene Completed: Yes  Size (Gauge): 20 G  Orientation: Right  Location: Antecubital  Site Prep: Alcohol;Chlorhexidine    Number of days: 3       Peripheral IV 06/17/24 18 G Left Antecubital (Active)   Placement Date/Time: 06/17/24 0951   Size (Gauge): 18 G  Orientation: Left  Location: Antecubital  Technique: Ultrasound guidance   Number of days: 1       External Urinary Catheter Male (Active)   Placement Date/Time: 06/14/24 2200   Hand Hygiene Completed: Yes  External Catheter Type: Male   Number of days: 3       Results for orders placed or performed during the hospital encounter of 06/14/24 (from the past 96 hour(s))   SST TOP   Result " Value Ref Range    Extra Tube Hold for add-ons.    B-Type Natriuretic Peptide   Result Value Ref Range     (H) 0 - 99 pg/mL   Vancomycin   Result Value Ref Range    Vancomycin 17.9 5.0 - 20.0 ug/mL   CBC   Result Value Ref Range    WBC 4.8 4.4 - 11.3 x10*3/uL    nRBC 0.0 0.0 - 0.0 /100 WBCs    RBC 2.79 (L) 4.50 - 5.90 x10*6/uL    Hemoglobin 8.2 (L) 13.5 - 17.5 g/dL    Hematocrit 27.2 (L) 41.0 - 52.0 %    MCV 98 80 - 100 fL    MCH 29.4 26.0 - 34.0 pg    MCHC 30.1 (L) 32.0 - 36.0 g/dL    RDW 16.1 (H) 11.5 - 14.5 %    Platelets 142 (L) 150 - 450 x10*3/uL   Comprehensive metabolic panel   Result Value Ref Range    Glucose 86 74 - 99 mg/dL    Sodium 139 136 - 145 mmol/L    Potassium 5.0 3.5 - 5.3 mmol/L    Chloride 107 98 - 107 mmol/L    Bicarbonate 23 21 - 32 mmol/L    Anion Gap 14 10 - 20 mmol/L    Urea Nitrogen 48 (H) 6 - 23 mg/dL    Creatinine 3.35 (H) 0.50 - 1.30 mg/dL    eGFR 17 (L) >60 mL/min/1.73m*2    Calcium 8.3 (L) 8.6 - 10.3 mg/dL    Albumin 2.9 (L) 3.4 - 5.0 g/dL    Alkaline Phosphatase 59 33 - 136 U/L    Total Protein 5.8 (L) 6.4 - 8.2 g/dL    AST 28 9 - 39 U/L    Bilirubin, Total 0.4 0.0 - 1.2 mg/dL    ALT 13 10 - 52 U/L   Vancomycin   Result Value Ref Range    Vancomycin 15.3 5.0 - 20.0 ug/mL   Basic Metabolic Panel   Result Value Ref Range    Glucose 104 (H) 74 - 99 mg/dL    Sodium 140 136 - 145 mmol/L    Potassium 4.6 3.5 - 5.3 mmol/L    Chloride 109 (H) 98 - 107 mmol/L    Bicarbonate 23 21 - 32 mmol/L    Anion Gap 13 10 - 20 mmol/L    Urea Nitrogen 46 (H) 6 - 23 mg/dL    Creatinine 3.22 (H) 0.50 - 1.30 mg/dL    eGFR 18 (L) >60 mL/min/1.73m*2    Calcium 7.8 (L) 8.6 - 10.3 mg/dL   CBC and Auto Differential   Result Value Ref Range    WBC 4.8 4.4 - 11.3 x10*3/uL    nRBC 0.0 0.0 - 0.0 /100 WBCs    RBC 2.57 (L) 4.50 - 5.90 x10*6/uL    Hemoglobin 7.5 (L) 13.5 - 17.5 g/dL    Hematocrit 24.3 (L) 41.0 - 52.0 %    MCV 95 80 - 100 fL    MCH 29.2 26.0 - 34.0 pg    MCHC 30.9 (L) 32.0 - 36.0 g/dL    RDW 16.0  (H) 11.5 - 14.5 %    Platelets 143 (L) 150 - 450 x10*3/uL    Neutrophils % 72.4 40.0 - 80.0 %    Immature Granulocytes %, Automated 1.9 (H) 0.0 - 0.9 %    Lymphocytes % 7.8 13.0 - 44.0 %    Monocytes % 13.5 2.0 - 10.0 %    Eosinophils % 4.4 0.0 - 6.0 %    Basophils % 0.0 0.0 - 2.0 %    Neutrophils Absolute 3.44 1.60 - 5.50 x10*3/uL    Immature Granulocytes Absolute, Automated 0.09 0.00 - 0.50 x10*3/uL    Lymphocytes Absolute 0.37 (L) 0.80 - 3.00 x10*3/uL    Monocytes Absolute 0.64 0.05 - 0.80 x10*3/uL    Eosinophils Absolute 0.21 0.00 - 0.40 x10*3/uL    Basophils Absolute 0.00 0.00 - 0.10 x10*3/uL   Magnesium   Result Value Ref Range    Magnesium 1.56 (L) 1.60 - 2.40 mg/dL   SST TOP   Result Value Ref Range    Extra Tube Hold for add-ons.    Hemoglobin and hematocrit, blood   Result Value Ref Range    Hemoglobin 7.7 (L) 13.5 - 17.5 g/dL    Hematocrit 24.9 (L) 41.0 - 52.0 %   Vancomycin   Result Value Ref Range    Vancomycin 14.8 5.0 - 20.0 ug/mL   Basic Metabolic Panel   Result Value Ref Range    Glucose 97 74 - 99 mg/dL    Sodium 141 136 - 145 mmol/L    Potassium 4.8 3.5 - 5.3 mmol/L    Chloride 110 (H) 98 - 107 mmol/L    Bicarbonate 24 21 - 32 mmol/L    Anion Gap 12 10 - 20 mmol/L    Urea Nitrogen 48 (H) 6 - 23 mg/dL    Creatinine 3.31 (H) 0.50 - 1.30 mg/dL    eGFR 17 (L) >60 mL/min/1.73m*2    Calcium 8.0 (L) 8.6 - 10.3 mg/dL   Magnesium   Result Value Ref Range    Magnesium 1.50 (L) 1.60 - 2.40 mg/dL   SST TOP   Result Value Ref Range    Extra Tube Hold for add-ons.    CBC and Auto Differential   Result Value Ref Range    WBC 4.7 4.4 - 11.3 x10*3/uL    nRBC 0.0 0.0 - 0.0 /100 WBCs    RBC 2.61 (L) 4.50 - 5.90 x10*6/uL    Hemoglobin 7.7 (L) 13.5 - 17.5 g/dL    Hematocrit 25.1 (L) 41.0 - 52.0 %    MCV 96 80 - 100 fL    MCH 29.5 26.0 - 34.0 pg    MCHC 30.7 (L) 32.0 - 36.0 g/dL    RDW 16.3 (H) 11.5 - 14.5 %    Platelets 130 (L) 150 - 450 x10*3/uL    Neutrophils % 67.9 40.0 - 80.0 %    Immature Granulocytes %,  Automated 2.6 (H) 0.0 - 0.9 %    Lymphocytes % 9.6 13.0 - 44.0 %    Monocytes % 15.8 2.0 - 10.0 %    Eosinophils % 4.1 0.0 - 6.0 %    Basophils % 0.0 0.0 - 2.0 %    Neutrophils Absolute 3.19 1.60 - 5.50 x10*3/uL    Immature Granulocytes Absolute, Automated 0.12 0.00 - 0.50 x10*3/uL    Lymphocytes Absolute 0.45 (L) 0.80 - 3.00 x10*3/uL    Monocytes Absolute 0.74 0.05 - 0.80 x10*3/uL    Eosinophils Absolute 0.19 0.00 - 0.40 x10*3/uL    Basophils Absolute 0.00 0.00 - 0.10 x10*3/uL   ECG 12 Lead   Result Value Ref Range    Ventricular Rate 64 BPM    Atrial Rate 267 BPM    QRS Duration 80 ms    QT Interval 418 ms    QTC Calculation(Bazett) 431 ms    P Axis -11 degrees    R Axis 46 degrees    T Axis 56 degrees    QRS Count 11 beats    Q Onset 228 ms    P Onset 158 ms    P Offset 186 ms    T Offset 437 ms    QTC Fredericia 427 ms   Basic Metabolic Panel   Result Value Ref Range    Glucose 84 74 - 99 mg/dL    Sodium 140 136 - 145 mmol/L    Potassium 4.8 3.5 - 5.3 mmol/L    Chloride 110 (H) 98 - 107 mmol/L    Bicarbonate 22 21 - 32 mmol/L    Anion Gap 13 10 - 20 mmol/L    Urea Nitrogen 47 (H) 6 - 23 mg/dL    Creatinine 3.62 (H) 0.50 - 1.30 mg/dL    eGFR 16 (L) >60 mL/min/1.73m*2    Calcium 8.1 (L) 8.6 - 10.3 mg/dL   CBC and Auto Differential   Result Value Ref Range    WBC 4.5 4.4 - 11.3 x10*3/uL    nRBC 0.0 0.0 - 0.0 /100 WBCs    RBC 2.45 (L) 4.50 - 5.90 x10*6/uL    Hemoglobin 7.1 (L) 13.5 - 17.5 g/dL    Hematocrit 23.3 (L) 41.0 - 52.0 %    MCV 95 80 - 100 fL    MCH 29.0 26.0 - 34.0 pg    MCHC 30.5 (L) 32.0 - 36.0 g/dL    RDW 16.4 (H) 11.5 - 14.5 %    Platelets 150 150 - 450 x10*3/uL    Neutrophils % 66.1 40.0 - 80.0 %    Immature Granulocytes %, Automated 1.8 (H) 0.0 - 0.9 %    Lymphocytes % 9.7 13.0 - 44.0 %    Monocytes % 20.2 2.0 - 10.0 %    Eosinophils % 2.0 0.0 - 6.0 %    Basophils % 0.2 0.0 - 2.0 %    Neutrophils Absolute 2.94 1.60 - 5.50 x10*3/uL    Immature Granulocytes Absolute, Automated 0.08 0.00 - 0.50 x10*3/uL     Lymphocytes Absolute 0.43 (L) 0.80 - 3.00 x10*3/uL    Monocytes Absolute 0.90 (H) 0.05 - 0.80 x10*3/uL    Eosinophils Absolute 0.09 0.00 - 0.40 x10*3/uL    Basophils Absolute 0.01 0.00 - 0.10 x10*3/uL   SST TOP   Result Value Ref Range    Extra Tube Hold for add-ons.    Vancomycin   Result Value Ref Range    Vancomycin 14.2 5.0 - 20.0 ug/mL      Relevant Results  Recent Results (from the past 24 hour(s))   Basic Metabolic Panel    Collection Time: 06/18/24  6:37 AM   Result Value Ref Range    Glucose 84 74 - 99 mg/dL    Sodium 140 136 - 145 mmol/L    Potassium 4.8 3.5 - 5.3 mmol/L    Chloride 110 (H) 98 - 107 mmol/L    Bicarbonate 22 21 - 32 mmol/L    Anion Gap 13 10 - 20 mmol/L    Urea Nitrogen 47 (H) 6 - 23 mg/dL    Creatinine 3.62 (H) 0.50 - 1.30 mg/dL    eGFR 16 (L) >60 mL/min/1.73m*2    Calcium 8.1 (L) 8.6 - 10.3 mg/dL   CBC and Auto Differential    Collection Time: 06/18/24  6:37 AM   Result Value Ref Range    WBC 4.5 4.4 - 11.3 x10*3/uL    nRBC 0.0 0.0 - 0.0 /100 WBCs    RBC 2.45 (L) 4.50 - 5.90 x10*6/uL    Hemoglobin 7.1 (L) 13.5 - 17.5 g/dL    Hematocrit 23.3 (L) 41.0 - 52.0 %    MCV 95 80 - 100 fL    MCH 29.0 26.0 - 34.0 pg    MCHC 30.5 (L) 32.0 - 36.0 g/dL    RDW 16.4 (H) 11.5 - 14.5 %    Platelets 150 150 - 450 x10*3/uL    Neutrophils % 66.1 40.0 - 80.0 %    Immature Granulocytes %, Automated 1.8 (H) 0.0 - 0.9 %    Lymphocytes % 9.7 13.0 - 44.0 %    Monocytes % 20.2 2.0 - 10.0 %    Eosinophils % 2.0 0.0 - 6.0 %    Basophils % 0.2 0.0 - 2.0 %    Neutrophils Absolute 2.94 1.60 - 5.50 x10*3/uL    Immature Granulocytes Absolute, Automated 0.08 0.00 - 0.50 x10*3/uL    Lymphocytes Absolute 0.43 (L) 0.80 - 3.00 x10*3/uL    Monocytes Absolute 0.90 (H) 0.05 - 0.80 x10*3/uL    Eosinophils Absolute 0.09 0.00 - 0.40 x10*3/uL    Basophils Absolute 0.01 0.00 - 0.10 x10*3/uL   SST TOP    Collection Time: 06/18/24  6:37 AM   Result Value Ref Range    Extra Tube Hold for add-ons.    Vancomycin    Collection Time:  06/18/24  6:37 AM   Result Value Ref Range    Vancomycin 14.2 5.0 - 20.0 ug/mL      ECG 12 Lead    Result Date: 6/17/2024  atypical  Atrial flutter  / atrial tachycardia Low voltage QRS Nonspecific ST abnormality Abnormal ECG When compared with ECG of 14-JUN-2024 14:30, Vent. rate has decreased BY  62 BPM Non-specific change in ST segment in Inferior leads Confirmed by Yadira Hoskins ([id number]) on 6/17/2024 7:08:58 PM    ECG 12 lead    Result Date: 6/15/2024  Accelerated Junctional rhythm Low voltage QRS Nonspecific ST abnormality Abnormal ECG When compared with ECG of 30-MAY-2024 08:33, Junctional rhythm has replaced Atrial flutter See ED provider note for full interpretation and clinical correlation Confirmed by Chavo Cid ([id number]) on 6/15/2024 11:11:58 AM    US scrotum w doppler    Result Date: 6/14/2024  Interpreted By:  Julieta Callahan, STUDY: US SCROTUM WITH DOPPLER; 6/14/2024 4:37 pm   INDICATION: Swelling, pain, and erythema.   COMPARISON: None.   ACCESSION NUMBER(S): [id number]   ORDERING CLINICIAN: QUE GUZMAN   TECHNIQUE: Gray scale and color doppler imaging of the scrotum was performed with spectral waveform analysis. Arterial inflow and venous outflow documented. Duplex Doppler interrogation including color flow and spectral waveform analysis is performed.   FINDINGS: Right testis: 3.4 x 2.0 x 3.2 cm.   Left testis: 4.9 x 2.9 x 3.0 cm.   Epididymides: The epididymides are normal in size and echogenicity. There is a 2 x 3 x 3 mm cyst within right epididymal head.   The testes appear homogeneous with no intratesticular lesions. Ectasia of the rete testes on the right is present. Duplex Doppler interrogation of each testicle including color flow and spectral waveform analysis shows normal arterial and venous flow without torsion or orchitis.   There is a 3.6 x 6.0 x 3.0 encapsulated fluid collection anterior and superior to the right testicle containing low-level internal echoes and debris. This does not arise  from the right epididymis. This may represent a scrotal wall abscess. There appears to be diffuse thickening of the scrotal wall.       3.0 x 3.6 x 6.0 cm encapsulated fluid collection noted which is extratesticular in location and is seen anterior and superior to the right testicle. This contains low-level internal echoes and some thickening of its wall with internal debris. This may represent a scrotal abscess. Clinical correlation is necessary.   Ectasia of the rete testes on the right.   3 mm cyst right epididymal head.   No epididymal orchitis.   MACRO: None.   Signed by: Julieta Callahan 6/14/2024 4:59 PM Dictation workstation:   [id number]    XR chest 2 views    Result Date: 6/14/2024  Interpreted By:  Josh Soto, STUDY: XR CHEST 2 VIEWS;  6/14/2024 3:10 pm   INDICATION: Signs/Symptoms:Cough.   COMPARISON: 05/28/2024   ACCESSION NUMBER(S): [id number]   ORDERING CLINICIAN: WES ORTIZ   FINDINGS: Pacemaker leads are intact and properly positioned.       CARDIOMEDIASTINAL SILHOUETTE: Cardiomediastinal silhouette is normal in size and configuration.   LUNGS: There is complete opacification left hemithorax with volume loss and shift of the mediastinum to the left.   Since the prior examination further increase in density of the right lung base which may suggest atelectasis or infiltrate. Correlation with auscultation and inflammatory markers recommended. A small to moderate on right pleural effusion persists.   ABDOMEN: No remarkable upper abdominal findings.   BONES: No acute osseous changes.       1.  Complete opacification with volume loss left hemithorax. Increased density right lung base reflecting small to moderate right pleural effusion. Superimposed pneumonia can not be excluded.       MACRO: None   Signed by: Josh Soto 6/14/2024 3:36 PM Dictation workstation:   [id number]    CT abdomen pelvis wo IV contrast    Result Date: 6/14/2024  Interpreted By:  Schoenberger, Joseph, STUDY: CT ABDOMEN  PELVIS WO IV CONTRAST;  6/14/2024 2:55 pm   INDICATION: Signs/Symptoms:Scrotal swelling, erythema, erythema extending up into the abdomen.   COMPARISON: 05/22/2024   ACCESSION NUMBER(S): [id number]   ORDERING CLINICIAN: WES ORTIZ   TECHNIQUE: CT of the abdomen and pelvis was performed. Contiguous axial images were obtained at 3 mm slice thickness through the abdomen and pelvis. Coronal and sagittal reconstructions at 3 mm slice thickness were performed.  No intravenous or oral contrast agents were administered.   FINDINGS: Please note that the evaluation of vessels, lymph nodes and organs is limited without intravenous contrast.   LOWER CHEST: There is some persistent volume loss in the left lower lobe with persistent consolidation. There are few air bronchograms. There is rounded collection of fluid at the anterior aspect of this process which may relate to either necrotizing pneumonia or loculated pleural fluid or conceivably pulmonary abscess. However this is also unchanged from the prior. There is a however, a new moderate dependent layering right pleural effusion.   ABDOMEN:   LIVER: 2 cysts located in the superior aspect the left liver lobe are stable from the prior. No other focal abnormality.   BILE DUCTS: No dilation   GALLBLADDER: Dependent layering calcified gallstones. No wall thickening or pericholecystic fluid.   PANCREAS: Unremarkable   SPLEEN: Unremarkable   ADRENAL GLANDS: Bilateral adrenal glands appear normal.   KIDNEYS AND URETERS: The kidneys are normal in size and unremarkable in appearance.  No hydroureteronephrosis or nephroureterolithiasis is identified. 5 mm nonobstructing lower pole right renal stone unchanged.   PELVIS:   BLADDER: High choose 1   REPRODUCTIVE ORGANS: Unremarkable   BOWEL: The stomach is unremarkable.  Small bowel loops are normal in caliber without mural thickening. Colon is normal in caliber. There is a mobile cecum. There are multiple colonic diverticula. There is  no convincing evidence for acute diverticulitis. Normal appendix.   VESSELS: There is no aneurysmal dilatation of the abdominal aorta. The IVC appears normal.   PERITONEUM/RETROPERITONEUM/LYMPH NODES: There is no free or loculated fluid collection, no free intraperitoneal air. The retroperitoneum appears normal.  No abdominopelvic lymphadenopathy is present. The   ABDOMINAL WALL: There is soft tissues Amanda in the abdominal wall which is new from the prior. This is a set metric Roseann more pronounced on the left than on the right. This extends into the lower abdominal left-sided pannus. It does not appear to overtly involve the scrotum. There may be loculated right-sided hydrocele.   BONES: No suspicious osseous lesions are identified. 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       1.  The high significant development of abdominal wall edema in the subcutaneous tissues asymmetrically worse on the left. This does not appear to especially extend into the scrotum. 2. There is a new moderate right pleural effusion. 3. Persistent left basal consolidation with anterior oval shaped collection of fluid with similar appearance to prior other than lack of air bronchograms on this exam compared to the prior. Associated volume loss.     MACRO: None   Signed by: Joseph Schoenberger 6/14/2024 3:30 PM Dictation workstation:   [id number]    US renal complete    Result Date: 6/8/2024  Interpreted By:  Ryan Best and Weaver Jakob STUDY: US RENAL COMPLETE;  6/6/2024 5:51 pm   INDICATION: Signs/Symptoms:VIK.   COMPARISON: None.   ACCESSION NUMBER(S): [id number]   ORDERING CLINICIAN: ADRYAN SIMON   TECHNIQUE: Multiple images of the kidneys were obtained.   FINDINGS: RIGHT KIDNEY: The right kidney measures 11.8 cm in length. The renal cortical echogenicity is increased. Renal cortical thickness is mildly decreased. No hydronephrosis is present; no evidence of nephrolithiasis.    LEFT KIDNEY: The left kidney is poorly visualized, measuring approximately 11.8 cm. Renal cortical echogenicity is increased. No apparent hydronephrosis or nephrolithiasis.   BLADDER: The urinary bladder is unremarkable in appearance. Poorly visualized small volume free fluid in the left upper quadrant.       Examination is somewhat limited due to patient body habitus, positioning. Within this limitation: Increased renal cortical echogenicity bilaterally which may relate to medical renal disease. No hydronephrosis or nephrolithiasis.   I personally reviewed the images/study and I agree with the findings as stated. This study was interpreted at [place], [place].   MACRO: None   Signed by: Ryan Urena 6/8/2024 3:02 AM Dictation workstation:   [id number]    ECG 12 Lead    Result Date: 5/31/2024  Atrial flutter with variable AV block with premature ventricular or aberrantly conducted complexes Low voltage QRS Abnormal ECG When compared with ECG of 28-MAY-2024 01:40, Atrial flutter has replaced Atrial fibrillation Nonspecific T wave abnormality, improved in Inferior leads Confirmed by Jovanny Loera ([id number]) on 5/31/2024 1:51:33 PM    Flexible Sigmoidoscopy    Result Date: 5/31/2024  Table formatting from the original result was not included. Impression Dark red blood visualized in the rectum. Blood was cleared without any active bleeding appreciated nor underlying mucosal changes. Few small and large, scattered diverticula with no inflammation in the sigmoid colon; no bleeding was identified. Diverticula were irrigated without any bleeding or stigmata of recent bleed appreciated. All observed locations appeared normal, including the sigmoid colon, rectosigmoid and rectum. No mucosal abnormalities. Normal on retroflexion. Green bilious stool proximal to the rectum, most notably in the proximal descending colon and transverse colon, without any blood, blood clots  or hematin. Findings Dark red blood visualized in the rectum. Blood was cleared without any active bleeding appreciated nor underlying mucosal changes. Few small and large, scattered diverticula with no inflammation in the sigmoid colon; no bleeding was identified. Diverticula were irrigated without any bleeding or stigmata of recent bleed appreciated. All observed locations appeared normal, including the sigmoid colon, rectosigmoid and rectum. No mucosal abnormalities. Normal on retroflexion. Green bilious stool proximal to the rectum, most notably in the proximal descending colon and transverse colon, without any blood, blood clots or hematin. Recommendation  Follow up with primary gastroenterologist  Follow up with inpatient GI consult service, Dr. Valenzuela and Dr. 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 The patient's tolerance of previous anesthesia has been reviewed. The risks and benefits of the procedure and the sedation options and risks were discussed with the patient. All questions were answered and informed consent obtained. Details of the Procedure The patient underwent no sedation. The patient's blood pressure, ECG, heart rate, level of consciousness, oxygen and respirations were monitored throughout the procedure. A digital rectal exam was performed. A perianal exam was  performed. The scope was introduced through the anus and advanced to the transverse colon. Retroflexion was performed in the rectum. The quality of bowel preparation was evaluated using the [place] Bowel Preparation Scale with scores of: left colon = 3. Bowel prep was not adequate. The patient experienced no blood loss. The procedure was not difficult. The patient tolerated the procedure well. There were no apparent adverse events. Events Procedure Events Event Event Time ENDO SCOPE IN TIME 5/24/2024  1:08 PM ENDO SCOPE OUT TIME 5/24/2024  1:26 PM Specimens No specimens collected Procedure Location [facility] [address] [facility] [id number] [phone] Referring Provider Grayson Nichols, APRN- [address] [address] Procedure Provider Ale Archibald MD     Lower extremity venous duplex left    Result Date: 5/31/2024  Interpreted By:  Nathan Argueta and Weaver Jakob STUDY: [facility] US LOWER EXTREMITY VENOUS DUPLEX LEFT;  5/30/2024 3:58 pm   INDICATION: Signs/Symptoms:LLE swelling, eval for DVT.   COMPARISON: None.   ACCESSION NUMBER(S): [id number]   ORDERING CLINICIAN: ALEXANDRA AVILA   TECHNIQUE: Vascular ultrasound of the left lower extremity was performed. Real-time compression views as well as Gray scale, color Doppler and spectral Doppler waveform analysis was performed.   FINDINGS: Evaluation of the visualized portions of the left common femoral vein, proximal, mid, and distal femoral vein, and popliteal vein were performed.  Evaluation of the visualized portions of the  posterior tibial and peroneal veins were also performed. Evaluation of the calf veins limited due to edema.   Echogenic intraluminal material in the proximal greater saphenous vein with partial compressibility and color Doppler signal The evaluated veins demonstrate normal compressibility. There is intact venous flow demonstrating normal respiratory  variability and normal augmentation of flow with calf compression. Therefore, there is no ultrasonographic evidence for deep vein thrombosis within the evaluated veins.       1.  No sonographic evidence for deep vein thrombosis within the evaluated veins of the left lower extremity. 2. Nonocclusive thrombus of the proximal greater saphenous vein, a superficial vein. Recommend correlation with concern for superficial thrombophlebitis.   I personally reviewed the images/study and I agree with the findings as stated by Agustín Cheatham MD. This study was interpreted at [place], [place].   MACRO: None   Signed by: Nathan Argueta 5/31/2024 5:32 AM Dictation workstation:   [id number]    ECG 12 Lead    Result Date: 5/30/2024  Atrial flutter with variable AV block Rightward axis Low voltage QRS Nonspecific ST and T wave abnormality Abnormal ECG When compared with ECG of 16-MAY-2024 21:13, Significant changes have occurred Confirmed by Jovanny Loera ([id number]) on 5/30/2024 11:03:21 AM    US chest    Result Date: 5/29/2024  Interpreted By:  Dinh Plunkett, STUDY: US CHEST; 5/29/20243:53 pm   INDICATION: Signs/Symptoms:Thoracentesis (Left) diagnostic/therapeutic.   COMPARISON: None.   ACCESSION NUMBER(S): [id number]   ORDERING CLINICIAN: ALEXANDRA AVILA   TECHNIQUE: INTERVENTIONALIST(S): Dinh Plunkett   CONSENT: The patient/patient's POA/next of kin was informed of the nature of the proposed procedure. The purposes, alternatives, risks, and benefits were explained and discussed. All questions were answered and consent was obtained.   SEDATION: None   TIME OUT: A time out was performed immediately prior to procedure start with the interventional team, correctly identifying the patient name, date of birth, MRN, procedure, anatomy (including marking of site and side), patient position, procedure consent form, relevant laboratory and imaging test results, antibiotic administration, safety  precautions, and procedure-specific equipment needs.   FINDINGS: The patient was placed in the supine position.   The thoracic space was examined with grey scale ultrasound, and an absence of free fluid was demonstrated on ultrasound. Thoracic images were captured to demonstrate. A thoracentesis was not performed.       Absence of free fluid for procedure. A thoracentesis was not performed.   I personally performed and/or directly supervised this study and was present for the entire procedure.   Performed and dictated at [facility].   Signed by: Dinh Plunkett 5/29/2024 3:53 PM Dictation workstation:   [id number]    XR chest 1 view    Result Date: 5/28/2024  Interpreted By:  Jovany Nuñez, STUDY: XR CHEST 1 VIEW;  5/28/2024 8:03 am   INDICATION: Signs/Symptoms:Rule out infection.   COMPARISON: Chest radiograph dated 5/27/2024   ACCESSION NUMBER(S): [id number]   ORDERING CLINICIAN: ALEXANDRA AVILA   FINDINGS: AP radiograph of the chest   Pacemaker electrodes appear in adequate position. There is virtually complete opacification of the left thorax. The heart mediastinum are shifted to the left indicating volume loss. Compensatory hyperaeration of the right lung and pulmonary vascular shunting to the right lung is noted increased from previous study.         1. Virtually complete opacification of the left thorax with heart and mediastinum shifted to the left indicating volume loss 2. Compensatory pulmonary vascular shunting to the right lung       Signed by: Jovany Nuñez 5/28/2024 10:38 AM Dictation workstation:   [id number]    Electrocardiogram, 12-lead PRN ACS symptoms    Result Date: 5/28/2024  Atrial fibrillation with rapid ventricular response Nonspecific ST and T wave abnormality , probably digitalis effect Abnormal ECG When compared with ECG of 28-MAY-2024 01:39, (unconfirmed) No significant change was found Confirmed by Jovanny Loera ([id number]) on 5/28/2024 10:17:48 AM    FL  modified barium swallow study    Result Date: 5/26/2024  Interpreted By:  Sohail Torres  and Xavi Jerez STUDY: FL MODIFIED BARIUM SWALLOW STUDY;; 5/25/2024 9:06 am   INDICATION: Signs/Symptoms:r\o dysphagia.   COMPARISON: None.   ACCESSION NUMBER(S): [id number]   ORDERING CLINICIAN: ALEXANDRA AVILA   TECHNIQUE: MBSS completed. Informed verbal consent obtained prior to completion of exam. Trials of thin, nectar thick,  puree, and regular solids given. Fluoroscopy time :  2 minutes.   SLP: Meri Hurley MS CCC/SLP Phone/Pager: Epic Chat   SPEECH FINDINGS: Reason for referral: Further assessment of oropharyngeal swallow Patient hx: Recent RLL PNA. S/s of aspiration w/3 oz Swallow Protocol Respiratory status: Nasal cannula Previous diet: Reg/thin   FINAL SPEECH RECOMMENDATIONS   Diet recommendations/feeding strategies: Solid Diet Recommendations : Easy to Chew Liquid Diet Recommendations: Thin Medication Administration: Single w/ sips of water, whole in puree as needed   Safe Swallow Strategies/Guidelines: Only present PO intake when awake and alert Supervision/assist w/ PO intake to assure adherence to safe swallow strategies Upright positioning Slow rate/small boluses   Follow-up speech therapy recommended: Yes.   Short term goals: Pt will tolerate least restrictive diet with adequate oral phase and no s/s of aspiration. Date initiated: 5/25/24 Status: Goal initiated   Pt will adhere to safe swallow strategies during PO intake with > 90% accuracy independently. Date initiated: 5/25/24 Status: Goal initiated   Education provided: Yes.     Mechanics of the swallow summary: *Oral phase: Mild deficits.   Adequate bolus acceptance.  Prolonged mastication and bolus formation/transit w/ regular solids.  No oral residue.   *Pharyngeal phase: Mild deficits.   Incomplete epiglottic inversion, may be related to presence of NGT. Timely swallow initiation.  Adequate hyolaryngeal elevation/excursion.  Trace penetration x1  with consecutive boluses of thin liquids related to incomplete airway protection.  Ejected upon swallow completion.  No other penetration and no aspiration with any other consistency assessed.  Mild residue at the valleculae following the swallow.  Largely cleared with liquid wash.  Mildly reduced distention of PES.     SLP impressions with severity rating: Mild oropharyngeal dysphagia characterized by prolonged mastication/bolus formation and mild residue at the valleculae following the swallow.   Thin Liquids (MBSS) Rosenbek's Penetration Aspiration Scale, Thin Liquids (MBSS): 2. PENETRATION that CLEARS - contrast enter airway, above vocal cords, no residue     Nectar Thick Liquids (MBSS) Rosenbek's Penetration Aspiration Scale, Nectar thick liquids (MBSS): 1. NO ASPIRATION & NO PENETRATION - no aspiration, contrast does not enter airway       Purees (MBSS) Rosenbek's Penetration Aspiration Scale, Purees (MBSS): 1. NO ASPIRATION & NO PENETRATION - no aspiration, contrast does not enter airway     Solids (MBSS) Rosenbek's Penetration Aspiration Scale, Solids (MBSS): 1. NO ASPIRATION & NO PENETRATION - no aspiration, contrast does not enter airway     Speech Therapy section of this report signed by Meri Hurley MS CCC/SLP on 5/25/2024 at 10:17 am.   RADIOLOGY FINDINGS: Nasoenteric tube is partially visualized. No evidence of acute osseous abnormality of the cervical spine. Patient is edentulous.   Radiology section of this report signed by Dr. Torres.       1. No radiographic evidence of acute process in the neck. 2. Please refer to speech pathology report for additional findings.   MACRO: None   Signed by: Sohail Torres 5/26/2024 3:51 PM Dictation workstation:   [id number]    XR abdomen 1 view    Result Date: 5/25/2024  Interpreted By:  Marcie Shah, STUDY: XR ABDOMEN 1 VIEW; 5/25/2024 5:45 pm   INDICATION: Signs/Symptoms:ngt placed.   COMPARISON: Radiograph dated 05/25/2024, 2:33 p.m.    ACCESSION NUMBER(S): [id number]   ORDERING CLINICIAN: ALEXANDRA AVILA   FINDINGS: Single-view of the abdomen. The pelvis is not included. Enteric tube is in place with the tip projecting over the stomach. Positive contrast is identified in the gastric fundus. Prominent loops of bowel throughout the visualized upper abdomen. Nonobstructive bowel gas pattern.  Limited evaluation of pneumoperitoneum on supine imaging, however no gross evidence of free air is noted.   Extensive consolidation in visualized portion of the left lung.   Osseous structures demonstrate no acute bony changes.       1.  Enteric tube projecting over the proximal stomach. 2. Again seen multiple prominent loops of bowel throughout the upper abdomen. Correlation with ileus. Recommend follow-up radiograph.   Signed by: Marcie Rodriguez 5/25/2024 5:54 PM Dictation workstation:   [id number]    XR abdomen 1 view    Result Date: 5/25/2024  Interpreted By:  Marcie Shah, STUDY: XR ABDOMEN 1 VIEW; 5/25/2024 3:06 pm   INDICATION: Signs/Symptoms:distended abdomen, assess for dilated bowel loops.   COMPARISON: Radiograph dated 05/21/2024   ACCESSION NUMBER(S): [id number]   ORDERING CLINICIAN: ALEXANDRA AVILA   FINDINGS: Views of the abdomen and pelvis. What is presumed to be enteric tube is projecting over the lower mediastinum. Positive contrast is identified in the expected location of the stomach. There is also positive contrast throughout the loops of bowel in the lower abdomen. Prominent loops of colon. Limited evaluation of pneumoperitoneum on supine imaging, however no gross evidence of free air is noted.   Consolidative opacities in left lung.   Osseous structures demonstrate no acute bony changes.       1.  Prominent loops of colon which can be due to ileus. Recommend follow-up radiograph.   Signed by: Marcie Rodriguez 5/25/2024 5:33 PM Dictation workstation:   [id number]    XR chest 1 view    Result Date: 5/23/2024  Interpreted By:   Jovany Nuñez  and Nathaniel Andrade STUDY: XR CHEST 1 VIEW; ;  5/22/2024 6:45 pm   INDICATION: Signs/Symptoms:Worsening tachypnea, confusion.   COMPARISON: Chest radiograph on 05/21/2024.   ACCESSION NUMBER(S): [id number]   ORDERING CLINICIAN: SUN CHAMPAGNE   FINDINGS: Single AP view of the chest.   Right subclavian vein approach pacemaker/AICD in place with leads projecting over the right atrium and ventricle. Enteric tube coursing below diaphragm tip overlying the expected position of the gastric body.   The cardiomediastinal silhouette is obscured, similar to prior exam.   Persistent near-complete opacification of the left hemithorax, with mild interval increase in air bronchograms within the left upper lung zone. Redemonstration of patchy right infrahilar airspace opacities and interstitial prominence on the right. No right-sided pleural effusion or pneumothorax.   No acute osseous abnormality.       1. Persistent near-complete opacification of the left hemithorax, with mild interval increase in air bronchograms within the left upper lung zone. 2. Persistent patchy right infrahilar airspace opacities and interstitial prominence throughout the right lung, which may represent aspiration/pneumonia in the appropriate clinical setting. 3. Medical devices as above.   I personally reviewed the images/study and I agree with the findings as stated by Dr. Raymond Armstrong M.D. This study was interpreted at [place], [place].   MACRO: None   Signed by: Jovany Nuñez 5/23/2024 7:32 AM Dictation workstation:   [id number]    CT chest abdomen pelvis wo IV contrast    Result Date: 5/23/2024  Interpreted By:  Sohail Torres and Ogievich Taessa STUDY: CT CHEST ABDOMEN PELVIS WO CONTRAST;  5/22/2024 12:15 pm   INDICATION: Signs/Symptoms:c/f sepsis.   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 N/V/D with [employer] concernign for UPI GIB and ongoing fevers.   COMPARISON: CT chest/abdomen/pelvis 05/18/2024   ACCESSION NUMBER(S): [id number]   ORDERING CLINICIAN: SUN CHAMPAGNE   TECHNIQUE: CT of the chest, abdomen and pelvis was performed. Contiguous axial images were obtained at 3 mm slice thickness through the chest, abdomen and pelvis. Coronal and sagittal reconstructions at 3 mm slice thickness were performed.  No intravenous contrast was administered; positive oral contrast was given.   FINDINGS: Please note that the study is limited without intravenous contrast.   Respiratory motion artifact.   CHEST:   LUNG/PLEURA/LARGE AIRWAYS: Trachea and bilateral mainstem bronchi are patent.   Again seen complete opacification of the left hemithorax with air bronchograms consistent with significant collapse of the left lung.   There is left pleural thickening (example series 2, image 38) with a focus of left posterior chest wall extension at the level of the 9th-10th (series 2, image 71) and 10th-11th (series 2, image 82) rib spaces, similar to prior, findings consistent with patient's known mesothelioma.   Similar small loculated pleural effusion measuring 7.1 x 4.1 x 2.3 cm (series 2, image 66 and series 900, image 62).   Interval improvement of right lower lobe ill-defined patchy infiltrate (series 4, image 143). Slight interval increase in right trace pleural effusion. No new focal consolidations. No evidence of pneumothorax.   VESSELS: The previously noted ill-defined hypodensity in the left pulmonary artery is not well evaluated on this exam due to beam hardening artifact, positioning of patient's arms, and lack of venous contrast.   Dilated main pulmonary artery measuring 3.8 cm (series 2, image 40), similar to prior. Ectatic ascending aorta measuring 4.1 cm, similar to prior.  Mild to moderate coronary artery calcifications are present.   HEART: The heart is normal in  size.  Trace pericardial fluid, similar to prior. Right-sided subclavian approach dual-chambercardiac ICD/pacemaker, with the leads in the right atrium and right ventricle.   MEDIASTINUM AND ANDREA: Multiple prominent mediastinal and perihilar lymph nodes similar to prior. For example:   1.3 cm subcarinal lymph node (series 2, image 48).   1 cm perihilar lymph node (series 2, image 33).   Enteric tube courses through the esophagus and terminates in the body of the stomach. Otherwise otherwise esophagus is within normal limits.   CHEST WALL AND LOWER NECK: Right chest wall cardiac pacemaker as described above. The visualized thyroid gland appears within normal limits.   ABDOMEN:   LIVER: The liver is normal in size. Similar hypoattenuating lesions with the largest measuring 2 cm segment 4A (series 2, image 71).   BILE DUCTS: The bile ducts are not dilated.   GALLBLADDER: The gallbladder is not distended. Calcified stones are noted.   PANCREAS: The pancreas appears unremarkable.   SPLEEN: Within normal limits.   ADRENAL GLANDS: Bilateral adrenal glands appear normal.   KIDNEYS AND URETERS: Bilateral mildly atrophic kidneys with mild perinephric stranding similar to prior. There is a 0.6 cm left midpole nonobstructing renal calculi. No hydroureteronephrosis. No right nephroureterolithiasis.   PELVIS:   BLADDER: The urinary bladder is underdistended with Forrest catheter in place. There is air in the urinary bladder likely from the Forrest.   REPRODUCTIVE ORGANS: No pelvic masses.   BOWEL: NG tube body of the stomach. Otherwise, the stomach is unremarkable. The small is normal in caliber and demonstrate no wall thickening. Proximal sigmoid diverticulosis with interval improvement in the mild wall thickening and adjacent fat stranding involving the proximal sigmoid colon (series 902, image 93). Interval insertion of rectal tube.   VESSELS: Mild atherosclerosis of the abdominal aorta and its branching vessels. There is no  aneurysmal dilatation of the abdominal aorta..   Interval flattening of the inferior vena cava (image 2, image 121)/   PERITONEUM/RETROPERITONEUM/LYMPH NODES: No ascites or free air, no fluid collection.  The retroperitoneum appears unremarkable.  No enlarged mesenteric lymph nodes.   ABDOMINAL WALL: Within normal limits.   BONES: No suspicious osseous lesions are present. Degenerative discogenic disease is noted in the lower thoracic and lumbar spine most severe at L3-L4, L4-L5, and L5-L6..       Chest 1. Interval improvement of the right lower lobe patchy infiltrate. 2. Again seen left lung collapse, left pleural thickening with invasion into the left posterior chest wall, and small left loculated pleural effusion consistent with patient's known mesothelioma and is unchanged when compared to prior CT from 05/18/2024. 3. Unchanged prominent mediastinal and perihilar lymph nodes. 4. Previously noted left pulmonary artery thrombus is not well evaluated on this exam due to beam hardening artifact, positioning of patient's arms, and lack of intravenous contrast.   Abdomen-Pelvis 1. Interval flattening of the inferior vena cava which may be seen with hypovolemia. Recommend correlation with patient's fluid volume status. 2. Improved proximal sigmoid diverticulitis. 3. Additional unchanged findings as noted above.   I personally reviewed the images/study and I agree with the findings as stated by Patrice Aguilar DO, PGY-2. This study was interpreted at [facility], [place], [place].   MACRO: None   Signed by: Sohail Torres 5/23/2024 12:22 AM Dictation workstation:   [id number]    Transthoracic Echo (TTE) Complete    Result Date: 5/21/2024   [facility], [address] [place] [address]                Tel [phone] and Fax [phone] TRANSTHORACIC ECHOCARDIOGRAM REPORT  Patient Name:      ZAKIYA LAWSON       Reading Physician:    [id number] Edmondson                                                                Angela WALKER Study Date:        5/21/2024            Ordering Provider:    [id number] SUN CHAMPAGNE MRN/PID:           [id number]             Fellow: Accession#:        [id number]         Nurse: Date of Birth/Age: 1935 / 88 years Sonographer:          Dionisio Braga RDCS Gender:            M                    Additional Staff: Height:            172.72 cm            Admit Date: Weight:            109.77 kg            Admission Status:     Inpatient -                                                               Routine BSA / BMI:         2.22 m2 / 36.80      Encounter#:           [id number]                    kg/m2                                         Department Location:  [place] [address] Blood Pressure: 96 /57 mmHg Study Type:    TRANSTHORACIC ECHO (TTE) COMPLETE Diagnosis/ICD: Unspecified atrial fibrillation-I48.91; Hypertensive heart                disease with heart failure-I11.0 Indication:    hypoxic resp failure w/ new o2 requirement c/f HF CPT Code:      Echo Complete w Full Doppler-[id number] Patient History: Pertinent History: PAD, afib/flutter, hx DVT on warfarin, HTN, CAD s/p stent,                    mitral regurg, HLD,JOVANNI, basal cell ca on face, s/p removal                    and radiation of malignant mesothelioma, S/p L VATS with                    decort 5/2022. Study Detail: The following Echo studies were performed: 2D, M-Mode, color flow               and Doppler. Technically challenging study due to body habitus,               patient lying in supine position, poor acoustic windows and               prominent lung artifact. Definity used as a contrast agent for               endocardial border definition. Total contrast used for this               procedure was 2.0 mL via IV push. Unable to obtain suprasternal                notch view.  PHYSICIAN INTERPRETATION: Left Ventricle: The left ventricular systolic function is hyperdynamic, with an estimated ejection fraction of 70-75%. The patient is in atrial fibrillation which may influence the estimate of left ventricular function and transvalvular flows. There are no regional wall motion abnormalities. The left ventricular cavity size is normal. Left ventricular diastolic filling was not assessed. Left Atrium: The left atrium was not well visualized. Right Ventricle: The right ventricle was not well visualized. Unable to determine right ventricular systolic function. Right Atrium: The right atrium was not well visualized. Aortic Valve: The aortic valve is trileaflet. There is trivial aortic valve regurgitation. The peak instantaneous gradient of the aortic valve is 13.2 mmHg. Mitral Valve: The mitral valve is normal in structure. There is mild mitral annular calcification. There is trace mitral valve regurgitation. Tricuspid Valve: The tricuspid valve was not well visualized. Tricuspid regurgitation was not well visualized. Tricuspid regurgitation was not assessed. The Doppler estimated RVSP is mildly elevated at 36.6 mmHg. Pulmonic Valve: The pulmonic valve is not well visualized. There is physiologic pulmonic valve regurgitation. Pericardium: There is a trivial pericardial effusion. Aorta: The aortic root is abnormal. The aortic root appears moderately dilated and measures 4.50 cm. The Asc Ao is 3.70 cm. There is mild dilatation of the ascending aorta. Systemic Veins: The inferior vena cava appears to be of normal size. There is IVC inspiratory collapse greater than 50%. In comparison to the previous echocardiogram(s): Compared with study from 5/3/2022, the previous study was a MARY during a MARY/DCCV and comparison is limited.  CONCLUSIONS:  1. Poorly visualized anatomical structures due to suboptimal image quality.  2. Left ventricular systolic function is hyperdynamic with a 70-75%  estimated ejection fraction.  3. The patient is in atrial fibrillation which may influence the estimate of left ventricular function and transvalvular flows.  4. Mildly elevated RVSP.  5. Moderately dilated aortic root. QUANTITATIVE DATA SUMMARY: 2D MEASUREMENTS:                          Normal Ranges: Ao Root d:     4.50 cm   (2.0-3.7cm) LAs:           2.50 cm   (2.7-4.0cm) IVSd:          0.80 cm   (0.6-1.1cm) LVPWd:         0.80 cm   (0.6-1.1cm) LVIDd:         4.10 cm   (3.9-5.9cm) LVIDs:         2.20 cm LV Mass Index: 43.9 g/m2 LV % FS        46.3 % AORTA MEASUREMENTS:                    Normal Ranges: Asc Ao, d: 3.70 cm (2.1-3.4cm) LV SYSTOLIC FUNCTION BY 2D PLANIMETRY (MOD):                     Normal Ranges: EF-A4C View: 73.6 % (>=55%) EF-A2C View: 80.4 % EF-Biplane:  78.8 % LV DIASTOLIC FUNCTION:                     Normal Ranges: MV Peak E: 1.09 m/s (0.7-1.2 m/s) MV DT:     245 msec (150-240 msec) MITRAL VALVE:                 Normal Ranges: MV DT: 245 msec (150-240msec) AORTIC VALVE:                          Normal Ranges: AoV Vmax:      1.82 m/s  (<=1.7m/s) AoV Peak P.2 mmHg (<20mmHg) LVOT Max Philipp:  1.42 m/s  (<=1.1m/s) LVOT VTI:      18.80 cm LVOT Diameter: 2.00 cm   (1.8-2.4cm) AoV Area,Vmax: 2.45 cm2  (2.5-4.5cm2)  RIGHT VENTRICLE: TAPSE: 15.8 mm RV s'  0.18 m/s TRICUSPID VALVE/RVSP:                             Normal Ranges: Peak TR Velocity: 2.90 m/s RV Syst Pressure: 36.6 mmHg (< 30mmHg) PULMONIC VALVE:                      Normal Ranges: PV Max Philipp: 0.9 m/s  (0.6-0.9m/s) PV Max PG:  3.2 mmHg  [id number] Delvis Miller MD Electronically signed on 2024 at 5:46:15 PM  ** Final **     XR abdomen 1 view    Result Date: 2024  Interpreted By:  Jovany Nuñez, STUDY: XR ABDOMEN 1 VIEW;  2024 12:58 pm   INDICATION: Signs/Symptoms:post ngt.   COMPARISON: One-view abdomen 2024 13 a.m.   ACCESSION NUMBER(S): [id number]   ORDERING CLINICIAN: SUN CHAMPAGNE   FINDINGS: One-view abdomen    An enteric tube is positioned within the region of the gastric fundus several cm below the expected gastroesophageal junction. There is a predominantly fluid-filled small bowel and colon pattern. There is less gastric distention with air.   Opacification of the left thorax and multiple air bronchograms are noted.       1.  The enteric tube is positioned within the gastric fundus 2. Advancement of the tube would be recommended for more optimal positioning 3. Predominantly fluid-filled small bowel and colon pattern 4. Opacification of the visualized left thorax and multiple air bronchograms within the left lung   MACRO: None   Signed by: Jovany Nuñez 5/21/2024 2:19 PM Dictation workstation:   [id number]    XR abdomen 1 view    Result Date: 5/21/2024  Interpreted By:  Jovany Nuñez, STUDY: XR ABDOMEN 1 VIEW;  5/21/2024 11:13 am   INDICATION: Signs/Symptoms:ABD DISTENTION.   COMPARISON: None.   ACCESSION NUMBER(S): [id number]   ORDERING CLINICIAN: SUN CHAMPAGNE   FINDINGS: The stomach is distended with air. There is a predominantly fluid-filled small bowel and colon pattern. Gas is demonstrated within the colon and distal ileum. The left ventricular pacemaker electrode appears in adequate position. The left ventricle appears mildly enlarged. Osseous and articular anatomy is normal for age.       1.  The stomach is moderately distended with air otherwise, nonspecific predominantly fluid-filled bowel pattern   MACRO: None   Signed by: Jovany Nuñez 5/21/2024 2:17 PM Dictation workstation:   [id number]    XR chest 1 view    Result Date: 5/21/2024  Interpreted By:  Jovany Nuñez,  and Guicho Castro STUDY: XR CHEST 1 VIEW;  5/21/2024 6:14 am   INDICATION: Signs/Symptoms:SOB.   COMPARISON: Chest radiograph 05/16/2024 CT chest 05/18/2024   ACCESSION NUMBER(S): [id number]   ORDERING CLINICIAN: SUN CHAMPAGNE   FINDINGS: AP radiograph of the chest was provided.   Right subclavian vein approach pacemaker/AICD in place  with leads projecting over the right atrium and right ventricle.   CARDIOMEDIASTINAL SILHOUETTE: Cardiomediastinal silhouette is stable in size with complete obscuration of the left cardiomediastinal border.   LUNGS: Persistent near-complete opacification of the left hemithorax with interval reduced air bronchograms compared to prior. Persistent patchy right infrahilar airspace opacities are noted. Prominent perihilar and interstitial lung markings are noted on the right. No consolidation, effusion, or pneumothorax on the right.   ABDOMEN: No remarkable upper abdominal findings.   BONES: No acute osseous changes.       1. Virtually complete opacification of the left hemithorax with interval reduced air bronchograms. 2. Persistent patchy right infrahilar airspace opacities which may reflect aspiration changes and/or pneumonia in the appropriate clinical setting.   I personally reviewed the images/study and I agree with the findings as stated by Matthew Lindo MD. This study was interpreted at [place], [place].   MACRO: None   Signed by: Jovany Nuñez 5/21/2024 11:26 AM Dictation workstation:   [id number]    XR abdomen 1 view    Result Date: 5/20/2024  Interpreted By:  Sofya Ahumada, STUDY: XR ABDOMEN 1 VIEW;  5/19/2024 10:42 pm   INDICATION: Signs/Symptoms:abdominal distension.   COMPARISON: CT: 05/18/2024   ACCESSION NUMBER(S): [id number]   ORDERING CLINICIAN: SUN CHAMPAGNE   FINDINGS: Nonobstructive bowel gas pattern. Limited evaluation of pneumoperitoneum on supine imaging, however no gross evidence of free air is noted.   Whiteout throughout the left hemithorax.   Osseous structures demonstrate no acute bony changes.       1.  Nonspecific and nonobstructive gas pattern.   MACRO: None   Signed by: Sofya Ahumada 5/20/2024 8:47 AM Dictation workstation:   [id number]    Radiology:  ECG 12 lead    Result Date: 6/15/2024  Accelerated Junctional rhythm Low  voltage QRS Nonspecific ST abnormality Abnormal ECG When compared with ECG of 30-MAY-2024 08:33, Junctional rhythm has replaced Atrial flutter See ED provider note for full interpretation and clinical correlation Confirmed by Chavo Cid ([id number]) on 6/15/2024 11:11:58 AM    US scrotum w doppler    Result Date: 6/14/2024  Interpreted By:  Julieta Callahan, STUDY: US SCROTUM WITH DOPPLER; 6/14/2024 4:37 pm   INDICATION: Swelling, pain, and erythema.   COMPARISON: None.   ACCESSION NUMBER(S): [id number]   ORDERING CLINICIAN: QUE GUZMAN   TECHNIQUE: Gray scale and color doppler imaging of the scrotum was performed with spectral waveform analysis. Arterial inflow and venous outflow documented. Duplex Doppler interrogation including color flow and spectral waveform analysis is performed.   FINDINGS: Right testis: 3.4 x 2.0 x 3.2 cm.   Left testis: 4.9 x 2.9 x 3.0 cm.   Epididymides: The epididymides are normal in size and echogenicity. There is a 2 x 3 x 3 mm cyst within right epididymal head.   The testes appear homogeneous with no intratesticular lesions. Ectasia of the rete testes on the right is present. Duplex Doppler interrogation of each testicle including color flow and spectral waveform analysis shows normal arterial and venous flow without torsion or orchitis.   There is a 3.6 x 6.0 x 3.0 encapsulated fluid collection anterior and superior to the right testicle containing low-level internal echoes and debris. This does not arise from the right epididymis. This may represent a scrotal wall abscess. There appears to be diffuse thickening of the scrotal wall.       3.0 x 3.6 x 6.0 cm encapsulated fluid collection noted which is extratesticular in location and is seen anterior and superior to the right testicle. This contains low-level internal echoes and some thickening of its wall with internal debris. This may represent a scrotal abscess. Clinical correlation is necessary.   Ectasia of the rete testes on the  right.   3 mm cyst right epididymal head.   No epididymal orchitis.   MACRO: None.   Signed by: Julieta Callahan 6/14/2024 4:59 PM Dictation workstation:   [id number]    XR chest 2 views    Result Date: 6/14/2024  Interpreted By:  Josh Soto, STUDY: XR CHEST 2 VIEWS;  6/14/2024 3:10 pm   INDICATION: Signs/Symptoms:Cough.   COMPARISON: 05/28/2024   ACCESSION NUMBER(S): [id number]   ORDERING CLINICIAN: WES ORTIZ   FINDINGS: Pacemaker leads are intact and properly positioned.       CARDIOMEDIASTINAL SILHOUETTE: Cardiomediastinal silhouette is normal in size and configuration.   LUNGS: There is complete opacification left hemithorax with volume loss and shift of the mediastinum to the left.   Since the prior examination further increase in density of the right lung base which may suggest atelectasis or infiltrate. Correlation with auscultation and inflammatory markers recommended. A small to moderate on right pleural effusion persists.   ABDOMEN: No remarkable upper abdominal findings.   BONES: No acute osseous changes.       1.  Complete opacification with volume loss left hemithorax. Increased density right lung base reflecting small to moderate right pleural effusion. Superimposed pneumonia can not be excluded.       MACRO: None   Signed by: Josh Soto 6/14/2024 3:36 PM Dictation workstation:   [id number]    CT abdomen pelvis wo IV contrast    Result Date: 6/14/2024  Interpreted By:  Schoenberger, Joseph, STUDY: CT ABDOMEN PELVIS WO IV CONTRAST;  6/14/2024 2:55 pm   INDICATION: Signs/Symptoms:Scrotal swelling, erythema, erythema extending up into the abdomen.   COMPARISON: 05/22/2024   ACCESSION NUMBER(S): [id number]   ORDERING CLINICIAN: WES ORTIZ   TECHNIQUE: CT of the abdomen and pelvis was performed. Contiguous axial images were obtained at 3 mm slice thickness through the abdomen and pelvis. Coronal and sagittal reconstructions at 3 mm slice thickness were performed.  No intravenous or oral  contrast agents were administered.   FINDINGS: Please note that the evaluation of vessels, lymph nodes and organs is limited without intravenous contrast.   LOWER CHEST: There is some persistent volume loss in the left lower lobe with persistent consolidation. There are few air bronchograms. There is rounded collection of fluid at the anterior aspect of this process which may relate to either necrotizing pneumonia or loculated pleural fluid or conceivably pulmonary abscess. However this is also unchanged from the prior. There is a however, a new moderate dependent layering right pleural effusion.   ABDOMEN:   LIVER: 2 cysts located in the superior aspect the left liver lobe are stable from the prior. No other focal abnormality.   BILE DUCTS: No dilation   GALLBLADDER: Dependent layering calcified gallstones. No wall thickening or pericholecystic fluid.   PANCREAS: Unremarkable   SPLEEN: Unremarkable   ADRENAL GLANDS: Bilateral adrenal glands appear normal.   KIDNEYS AND URETERS: The kidneys are normal in size and unremarkable in appearance.  No hydroureteronephrosis or nephroureterolithiasis is identified. 5 mm nonobstructing lower pole right renal stone unchanged.   PELVIS:   BLADDER: High choose 1   REPRODUCTIVE ORGANS: Unremarkable   BOWEL: The stomach is unremarkable.  Small bowel loops are normal in caliber without mural thickening. Colon is normal in caliber. There is a mobile cecum. There are multiple colonic diverticula. There is no convincing evidence for acute diverticulitis. Normal appendix.   VESSELS: There is no aneurysmal dilatation of the abdominal aorta. The IVC appears normal.   PERITONEUM/RETROPERITONEUM/LYMPH NODES: There is no free or loculated fluid collection, no free intraperitoneal air. The retroperitoneum appears normal.  No abdominopelvic lymphadenopathy is present. The   ABDOMINAL WALL: There is soft tissues Amanda in the abdominal wall which is new from the prior. This is a set metric Roseann  more pronounced on the left than on the right. This extends into the lower abdominal left-sided pannus. It does not appear to overtly involve the scrotum. There may be loculated right-sided hydrocele.   BONES: No suspicious osseous lesions are identified. 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       1.  The high significant development of abdominal wall edema in the subcutaneous tissues asymmetrically worse on the left. This does not appear to especially extend into the scrotum. 2. There is a new moderate right pleural effusion. 3. Persistent left basal consolidation with anterior oval shaped collection of fluid with similar appearance to prior other than lack of air bronchograms on this exam compared to the prior. Associated volume loss.     MACRO: None   Signed by: Joseph Schoenberger 6/14/2024 3:30 PM Dictation workstation:   [id number]     Current Facility-Administered Medications:     acetaminophen (Tylenol) tablet 650 mg, 650 mg, oral, q4h PRN **OR** acetaminophen (Tylenol) oral liquid 650 mg, 650 mg, nasogastric tube, q4h PRN **OR** acetaminophen (Tylenol) suppository 650 mg, 650 mg, rectal, q4h PRN, Vipul Sotelo MD    albuterol 2.5 mg /3 mL (0.083 %) nebulizer solution 2.5 mg, 2.5 mg, nebulization, q6h PRN, Deondre Wagner MD    benzonatate (Tessalon) capsule 100 mg, 100 mg, oral, TID PRN, Vipul Sotelo MD, 100 mg at 06/17/24 1227    cefepime (Maxipime) in dextrose 5% 50 mL IV 1 g, 1 g, intravenous, q12h, Vipul Sotelo MD, 1 g at 06/18/24 0854    [Held by provider] enoxaparin (Lovenox) syringe 120 mg, 1 mg/kg, subcutaneous, q24h, Vipul Sotelo MD, 120 mg at 06/15/24 2041    melatonin tablet 3 mg, 3 mg, oral, Nightly PRN, Vipul Sotelo MD, 3 mg at 06/16/24 0435    metoprolol tartrate (Lopressor) tablet 25 mg, 25 mg, oral, BID, Chavo Cid MD, 25 mg at 06/18/24 0854    metroNIDAZOLE (Flagyl) 500 mg in NaCl (iso-os) 100 mL, 500 mg, intravenous, q8h, Deondre  MD Bernard, Stopped at 06/18/24 1703    ondansetron (Zofran) tablet 4 mg, 4 mg, oral, q8h PRN **OR** ondansetron (Zofran) injection 4 mg, 4 mg, intravenous, q8h PRN, Vipul Sotelo MD, 4 mg at 06/17/24 1743    [DISCONTINUED] pantoprazole (ProtoNix) EC tablet 40 mg, 40 mg, oral, Daily before breakfast, 40 mg at 06/16/24 0435 **OR** pantoprazole (ProtoNix) injection 40 mg, 40 mg, intravenous, BID, Deondre Wagner MD, 40 mg at 06/18/24 0854    polyethylene glycol (Glycolax, Miralax) packet 17 g, 17 g, oral, Daily PRN, Vipul Sotelo MD    promethazine-DM (Phenergan-DM) 6.25-15 mg/5 mL syrup 5 mL, 5 mL, oral, q4h PRN, Deondre Wagner MD, 5 mL at 06/17/24 1935    rosuvastatin (Crestor) tablet 10 mg, 10 mg, oral, Daily, Deondre Wagner MD, 10 mg at 06/18/24 0854    tamsulosin (Flomax) 24 hr capsule 0.4 mg, 0.4 mg, oral, Daily, Deondre Wagner MD, 0.4 mg at 06/18/24 0854    traMADol (Ultram) tablet 50 mg, 50 mg, oral, q8h PRN, Deondre Wagner MD, 50 mg at 06/16/24 1035    vancomycin (Vancocin) in dextrose 5 % water (D5W) 100 mL  mg, 500 mg, intravenous, q24h, Xenia Maguire, [facility], Stopped at 06/18/24 1211    vancomycin (Vancocin) pharmacy to dose - pharmacy monitoring, , miscellaneous, Daily PRN, Kati Miller, [facility]   Principal Problem:    Cellulitis of scrotum  Active Problems:    Atrial fibrillation with RVR (Multi)    Benign essential hypertension    Acute on chronic respiratory failure with hypoxia (Multi)    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Multi)    Recurrent pleural effusion    Abdominal wall cellulitis      Assessment/Plan    Extensive cellulitis and edema of the abdominal wall and lower extremities and scrotum 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  Patient recently had clinical trial of chemo and patient did not tolerate.  No further plan for advanced  mesothelioma.  Right pleural effusion .patient does not want any thoracentesis and want to be treated for cellulitis now.  Acute on chronic respiratory failure with hypoxia.  COPD continue bronchodilator.  Former smoker.  Morbid obesity.  Obstructive sleep apnea.  History of A-fib with RVR.  Hypertension.  Dyslipidemia.  GERD.  Patient DNR.  DVT prophylaxis.  PT OT.  P.o. diet.  6/15/2024 patient's leg and abdominal swelling and redness improving.  Mesothelioma is advanced and not able to tolerate any clinical trial chemo.  Patient recently was seen at [facility].  6/16/2024 patient has intermittent A-fib RVR.  Patient seen by EP Dr. 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  Patient recently had clinical trial of chemo and patient did not tolerate.  No further plan for advanced mesothelioma.  Right pleural effusion .patient does not want any thoracentesis and want to be treated for cellulitis now.  Acute on chronic respiratory failure with hypoxia.  COPD continue bronchodilator.  Former smoker.  Morbid obesity.  Obstructive sleep apnea.  History of A-fib with RVR.  Hypertension.  Dyslipidemia.  GERD.  Patient DNR.  DVT prophylaxis.  PT OT.  P.o. diet.  6/15/2024 patient's leg and abdominal swelling and redness improving.  Mesothelioma is advanced and not able to tolerate any clinical trial chemo.  Patient recently was seen at [facility].  . 6/16?24 Scrotal abscesses/hydrocele patient seen by urology plan to continue IV antibiotic patient clinically improving.  Advanced mesothelioma ,no further treatment plan for chemo.  Right pleural effusion patient does not want thoracentesis or an aggressive treatment  6/17/2024 patient has  significant cord morbid condition and mesothelioma advanced no further treatment helpful.  Patient and family decided hospice.  6/18/ 2000.4 plan to discharge to home with hospice patient and family in agreement of the plan of care of the patient.  Patient's significant comorbid condition including advanced mesothelioma    Luis Villasenor MD

## 2024-06-18 NOTE — NURSING NOTE
LSW Hospice Note    Vinicius Arriola is a Hospice Patient.   Hospice terminal diagnosis: Mesothelioma   Physician: Will be Shante Herrera NP on arrival home.   Visit type: Start admission.     Comments/recommendations: This LSW met with pt, spouse, and multiple family members. Reviewed hospice services, supports, LOC, and goals of care. After much discussion and processing with family they are all in agreement with hospice philosophy. Pt reporting that he wants to go home and does not want to return to the hospital. Pt with intermittent cough during visit. Family all in agreement with pt returning home. DME needs discussed and bed and O2 will be delivered tomorrow morning per family choice. This Lsw requested RN visit tomorrow afternoon prior to pt's discharge as family is worried about having needed medication in the home when pt arrives. Consent, HE'S, O2 agreement, and DNRCC completed and placed in paper chart.     Discharge Planning:  Patient to be discharged to home    The following is to be completed:  Discharge order: [facility]  State DNR signed by MD: Completed   Nursing facility referral/transfer form: N/A  Medication reconciliation: TBC  PAS/RR or convalescent stay form: N/A  Prescriptions for al narcotics/new medications: TBC  Transportation: TB  Other: N/A    Plan of care reviewed with patient/family members Spoke with pt, spouse, and children.    Plan of care reviewed with hospital staff members: Spoke with INDERJIT Spann and Ilda Aviles RN CM to discuss discharge planning.      Please notify [facility] of any changes in condition. Thank you.  Office: [phone] (8 am-6:30 pm M-F and 8 am-4:30 pm weekends and holidays)   [phone] (6:30 pm-8 am M-F and 4:30 pm-8 am weekends and holidays)    DIONNE Peterson

## 2024-06-18 NOTE — PROGRESS NOTES
Vancomycin Dosing by Pharmacy- FOLLOW UP    Vinicius Arriola is a 89 y.o. year old male who Pharmacy has been consulted for vancomycin dosing for cellulitis, skin and soft tissue. Based on the patient's indication and renal status this patient is being dosed based on a goal trough/random level of 15-20.     Renal function is currently declining.    Current vancomycin dose: 1500 mg given every 48 hours      Visit Vitals  /58   Pulse 78   Temp 37.2 °C (99 °F)   Resp 18        Lab Results   Component Value Date    CREATININE 3.62 (H) 2024    CREATININE 3.31 (H) 2024    CREATININE 3.22 (H) 2024    CREATININE 3.35 (H) 06/15/2024        Patient weight is as follows:   Vitals:    24 1408   Weight: 118 kg (260 lb)       Cultures:  No results found for the encounter in last 14 days.       I/O last 3 completed shifts:  In: 1092.3 (9.3 mL/kg) [P.O.:180; I.V.:312.3 (2.6 mL/kg); IV Piggyback:600]  Out: 1750 (14.8 mL/kg) [Urine:1750 (0.4 mL/kg/hr)]  Weight: 117.9 kg   I/O during current shift:  No intake/output data recorded.    Temp (24hrs), Av.6 °C (99.6 °F), Min:37 °C (98.6 °F), Max:38 °C (100.4 °F)      Assessment/Plan    Dose has been changed to 500 mg q24h based on trough obtained w/Stage 1  VIK.  The next level will be obtained on 24 at 0500. May be obtained sooner if clinically indicated.   Will continue to monitor renal function daily while on vancomycin and order serum creatinine at least every 48 hours if not already ordered.  Follow for continued vancomycin needs, clinical response, and signs/symptoms of toxicity.       Xenia Maguire, [facility]

## 2024-06-19 VITALS
OXYGEN SATURATION: 96 % | SYSTOLIC BLOOD PRESSURE: 97 MMHG | BODY MASS INDEX: 39.4 KG/M2 | HEART RATE: 131 BPM | DIASTOLIC BLOOD PRESSURE: 55 MMHG | WEIGHT: 260 LBS | RESPIRATION RATE: 20 BRPM | HEIGHT: 68 IN | TEMPERATURE: 98.1 F

## 2024-06-19 LAB
ANION GAP SERPL CALC-SCNC: 13 MMOL/L (ref 10–20)
BACTERIA BLD CULT: NORMAL
BACTERIA BLD CULT: NORMAL
BASOPHILS # BLD AUTO: 0.01 X10*3/UL (ref 0–0.1)
BASOPHILS NFR BLD AUTO: 0.2 %
BUN SERPL-MCNC: 47 MG/DL (ref 6–23)
CALCIUM SERPL-MCNC: 8.2 MG/DL (ref 8.6–10.3)
CHLORIDE SERPL-SCNC: 108 MMOL/L (ref 98–107)
CO2 SERPL-SCNC: 23 MMOL/L (ref 21–32)
CREAT SERPL-MCNC: 3.67 MG/DL (ref 0.5–1.3)
EGFRCR SERPLBLD CKD-EPI 2021: 15 ML/MIN/1.73M*2
EOSINOPHIL # BLD AUTO: 0.15 X10*3/UL (ref 0–0.4)
EOSINOPHIL NFR BLD AUTO: 2.8 %
ERYTHROCYTE [DISTWIDTH] IN BLOOD BY AUTOMATED COUNT: 16.4 % (ref 11.5–14.5)
GLUCOSE SERPL-MCNC: 81 MG/DL (ref 74–99)
HCT VFR BLD AUTO: 24.4 % (ref 41–52)
HGB BLD-MCNC: 7.4 G/DL (ref 13.5–17.5)
HOLD SPECIMEN: NORMAL
IMM GRANULOCYTES # BLD AUTO: 0.17 X10*3/UL (ref 0–0.5)
IMM GRANULOCYTES NFR BLD AUTO: 3.2 % (ref 0–0.9)
LYMPHOCYTES # BLD AUTO: 0.45 X10*3/UL (ref 0.8–3)
LYMPHOCYTES NFR BLD AUTO: 8.5 %
MCH RBC QN AUTO: 29.6 PG (ref 26–34)
MCHC RBC AUTO-ENTMCNC: 30.3 G/DL (ref 32–36)
MCV RBC AUTO: 98 FL (ref 80–100)
MONOCYTES # BLD AUTO: 0.96 X10*3/UL (ref 0.05–0.8)
MONOCYTES NFR BLD AUTO: 18.1 %
NEUTROPHILS # BLD AUTO: 3.57 X10*3/UL (ref 1.6–5.5)
NEUTROPHILS NFR BLD AUTO: 67.2 %
NRBC BLD-RTO: 0 /100 WBCS (ref 0–0)
PLATELET # BLD AUTO: 155 X10*3/UL (ref 150–450)
POTASSIUM SERPL-SCNC: 4.5 MMOL/L (ref 3.5–5.3)
RBC # BLD AUTO: 2.5 X10*6/UL (ref 4.5–5.9)
SODIUM SERPL-SCNC: 139 MMOL/L (ref 136–145)
VANCOMYCIN TROUGH SERPL-MCNC: 14.5 UG/ML (ref 5–20)
WBC # BLD AUTO: 5.3 X10*3/UL (ref 4.4–11.3)

## 2024-06-19 PROCEDURE — C9113 INJ PANTOPRAZOLE SODIUM, VIA: HCPCS | Performed by: INTERNAL MEDICINE

## 2024-06-19 PROCEDURE — 99239 HOSP IP/OBS DSCHRG MGMT >30: CPT | Performed by: STUDENT IN AN ORGANIZED HEALTH CARE EDUCATION/TRAINING PROGRAM

## 2024-06-19 PROCEDURE — 80202 ASSAY OF VANCOMYCIN: CPT | Performed by: PHARMACIST

## 2024-06-19 PROCEDURE — 2500000001 HC RX 250 WO HCPCS SELF ADMINISTERED DRUGS (ALT 637 FOR MEDICARE OP): Performed by: INTERNAL MEDICINE

## 2024-06-19 PROCEDURE — 2500000004 HC RX 250 GENERAL PHARMACY W/ HCPCS (ALT 636 FOR OP/ED): Performed by: INTERNAL MEDICINE

## 2024-06-19 PROCEDURE — 36415 COLL VENOUS BLD VENIPUNCTURE: CPT | Performed by: INTERNAL MEDICINE

## 2024-06-19 PROCEDURE — 85025 COMPLETE CBC W/AUTO DIFF WBC: CPT | Performed by: INTERNAL MEDICINE

## 2024-06-19 PROCEDURE — 2500000002 HC RX 250 W HCPCS SELF ADMINISTERED DRUGS (ALT 637 FOR MEDICARE OP, ALT 636 FOR OP/ED): Performed by: INTERNAL MEDICINE

## 2024-06-19 PROCEDURE — 80048 BASIC METABOLIC PNL TOTAL CA: CPT | Performed by: INTERNAL MEDICINE

## 2024-06-19 RX ORDER — AMOXICILLIN AND CLAVULANATE POTASSIUM 500; 125 MG/1; MG/1
1 TABLET, FILM COATED ORAL 2 TIMES DAILY
Qty: 20 TABLET | Refills: 0 | Status: SHIPPED | OUTPATIENT
Start: 2024-06-19 | End: 2024-06-29

## 2024-06-19 ASSESSMENT — PAIN SCALES - GENERAL
PAINLEVEL_OUTOF10: 0 - NO PAIN
PAINLEVEL_OUTOF10: 10 - WORST POSSIBLE PAIN

## 2024-06-19 ASSESSMENT — PAIN - FUNCTIONAL ASSESSMENT: PAIN_FUNCTIONAL_ASSESSMENT: 0-10

## 2024-06-19 ASSESSMENT — PAIN DESCRIPTION - ORIENTATION: ORIENTATION: RIGHT

## 2024-06-19 NOTE — PROGRESS NOTES
06/19/24 1239   Current Planned Discharge Disposition   Current Planned Discharge Disposition [facility]     Patient will be discharging home with [facility] at approximately 4 pm, family in room and in agreement with plan.

## 2024-06-19 NOTE — PROGRESS NOTES
Urology progress note for Wednesday, 6/19/2024  Discharge planning in progress for later today home with hospice care  Agree with giving patient 10 days of Augmentin for further antibiotic coverage at home as noted per medicine  Supportive care, conservative management    Additional medical and historical objective data reviewed and listed below    No current facility-administered medications on file prior to encounter.     Current Outpatient Medications on File Prior to Encounter   Medication Sig Dispense Refill    albuterol 2.5 mg /3 mL (0.083 %) nebulizer solution Take 3 mL (2.5 mg) by nebulization every 6 hours if needed for wheezing. 75 mL 11    ascorbic acid (Vitamin C) 1,000 mg tablet Take 1 tablet (1,000 mg) by mouth once daily.      atorvastatin (Lipitor) 40 mg tablet Take 1 tablet (40 mg) by mouth once daily. Replaces Crestor 10mg tab      benzonatate (Tessalon) 100 mg capsule Take 1 capsule (100 mg) by mouth 3 times a day as needed for cough. Do not crush or chew. 20 capsule 0    carboxymethylcellulose (Refresh Celluvisc) 1 % ophthalmic solution dropperette Administer 1 drop into affected eye(s) once daily in the morning.      diclofenac sodium (Voltaren) 1 % gel Apply 4.5 inches (4 g) topically 4 times a day as needed (pain). 8 g 1    ferrous sulfate 325 (65 Fe) MG EC tablet Take 65 mg by mouth 2 times a day with meals. Do not crush, chew, or split.      ipratropium-albuteroL (Duo-Neb) 0.5-2.5 mg/3 mL nebulizer solution Take 3 mL by nebulization every 6 hours.      melatonin 5 mg tablet Take 1 tablet (5 mg) by mouth as needed at bedtime.      metoprolol succinate XL (Toprol-XL) 50 mg 24 hr tablet Take 3 tablets (150 mg) by mouth once daily. Do not crush or chew. 90 tablet 0    nitroglycerin (Nitrostat) 0.4 mg SL tablet Place under the tongue every 5 minutes if needed for chest pain (up to 3 doses).      omega-3 fatty acids-fish oil 360-1,200 mg capsule Take 1 capsule (1,200 mg) by mouth twice a day.       ondansetron (Zofran) 4 mg tablet Take 1 tablet (4 mg) by mouth every 8 hours if needed for nausea. 20 tablet 0    oxygen (O2) gas therapy Inhale 1 each every 12 hours.  0    pantoprazole (ProtoNix) 40 mg EC tablet Take 1 tablet (40 mg) by mouth 2 times a day before meals. Do not crush, chew, or split. 60 tablet 0    promethazine-DM (Phenergan-DM) 6.25-15 mg/5 mL syrup Take 1.25 mL by mouth 4 times a day as needed for cough.      tamsulosin (Flomax) 0.4 mg 24 hr capsule Take 1 capsule (0.4 mg) by mouth once daily. 30 capsule 0    traZODone (Desyrel) 50 mg tablet Take 2 tablets (100 mg) by mouth once daily at bedtime.      zinc sulfate (Zincate) 220 (50 Zn) MG capsule Take 1 capsule (50 mg of elemental zinc) by mouth once daily.      [DISCONTINUED] rosuvastatin (Crestor) 10 mg tablet Take 1 tablet (10 mg) by mouth once daily.      [DISCONTINUED] ZINC ORAL Take 1 tablet by mouth once daily.         Results for orders placed or performed during the hospital encounter of 06/14/24 (from the past 96 hour(s))   Basic Metabolic Panel   Result Value Ref Range    Glucose 104 (H) 74 - 99 mg/dL    Sodium 140 136 - 145 mmol/L    Potassium 4.6 3.5 - 5.3 mmol/L    Chloride 109 (H) 98 - 107 mmol/L    Bicarbonate 23 21 - 32 mmol/L    Anion Gap 13 10 - 20 mmol/L    Urea Nitrogen 46 (H) 6 - 23 mg/dL    Creatinine 3.22 (H) 0.50 - 1.30 mg/dL    eGFR 18 (L) >60 mL/min/1.73m*2    Calcium 7.8 (L) 8.6 - 10.3 mg/dL   CBC and Auto Differential   Result Value Ref Range    WBC 4.8 4.4 - 11.3 x10*3/uL    nRBC 0.0 0.0 - 0.0 /100 WBCs    RBC 2.57 (L) 4.50 - 5.90 x10*6/uL    Hemoglobin 7.5 (L) 13.5 - 17.5 g/dL    Hematocrit 24.3 (L) 41.0 - 52.0 %    MCV 95 80 - 100 fL    MCH 29.2 26.0 - 34.0 pg    MCHC 30.9 (L) 32.0 - 36.0 g/dL    RDW 16.0 (H) 11.5 - 14.5 %    Platelets 143 (L) 150 - 450 x10*3/uL    Neutrophils % 72.4 40.0 - 80.0 %    Immature Granulocytes %, Automated 1.9 (H) 0.0 - 0.9 %    Lymphocytes % 7.8 13.0 - 44.0 %    Monocytes % 13.5 2.0 -  10.0 %    Eosinophils % 4.4 0.0 - 6.0 %    Basophils % 0.0 0.0 - 2.0 %    Neutrophils Absolute 3.44 1.60 - 5.50 x10*3/uL    Immature Granulocytes Absolute, Automated 0.09 0.00 - 0.50 x10*3/uL    Lymphocytes Absolute 0.37 (L) 0.80 - 3.00 x10*3/uL    Monocytes Absolute 0.64 0.05 - 0.80 x10*3/uL    Eosinophils Absolute 0.21 0.00 - 0.40 x10*3/uL    Basophils Absolute 0.00 0.00 - 0.10 x10*3/uL   Magnesium   Result Value Ref Range    Magnesium 1.56 (L) 1.60 - 2.40 mg/dL   SST TOP   Result Value Ref Range    Extra Tube Hold for add-ons.    Hemoglobin and hematocrit, blood   Result Value Ref Range    Hemoglobin 7.7 (L) 13.5 - 17.5 g/dL    Hematocrit 24.9 (L) 41.0 - 52.0 %   Vancomycin   Result Value Ref Range    Vancomycin 14.8 5.0 - 20.0 ug/mL   Basic Metabolic Panel   Result Value Ref Range    Glucose 97 74 - 99 mg/dL    Sodium 141 136 - 145 mmol/L    Potassium 4.8 3.5 - 5.3 mmol/L    Chloride 110 (H) 98 - 107 mmol/L    Bicarbonate 24 21 - 32 mmol/L    Anion Gap 12 10 - 20 mmol/L    Urea Nitrogen 48 (H) 6 - 23 mg/dL    Creatinine 3.31 (H) 0.50 - 1.30 mg/dL    eGFR 17 (L) >60 mL/min/1.73m*2    Calcium 8.0 (L) 8.6 - 10.3 mg/dL   Magnesium   Result Value Ref Range    Magnesium 1.50 (L) 1.60 - 2.40 mg/dL   SST TOP   Result Value Ref Range    Extra Tube Hold for add-ons.    CBC and Auto Differential   Result Value Ref Range    WBC 4.7 4.4 - 11.3 x10*3/uL    nRBC 0.0 0.0 - 0.0 /100 WBCs    RBC 2.61 (L) 4.50 - 5.90 x10*6/uL    Hemoglobin 7.7 (L) 13.5 - 17.5 g/dL    Hematocrit 25.1 (L) 41.0 - 52.0 %    MCV 96 80 - 100 fL    MCH 29.5 26.0 - 34.0 pg    MCHC 30.7 (L) 32.0 - 36.0 g/dL    RDW 16.3 (H) 11.5 - 14.5 %    Platelets 130 (L) 150 - 450 x10*3/uL    Neutrophils % 67.9 40.0 - 80.0 %    Immature Granulocytes %, Automated 2.6 (H) 0.0 - 0.9 %    Lymphocytes % 9.6 13.0 - 44.0 %    Monocytes % 15.8 2.0 - 10.0 %    Eosinophils % 4.1 0.0 - 6.0 %    Basophils % 0.0 0.0 - 2.0 %    Neutrophils Absolute 3.19 1.60 - 5.50 x10*3/uL     Immature Granulocytes Absolute, Automated 0.12 0.00 - 0.50 x10*3/uL    Lymphocytes Absolute 0.45 (L) 0.80 - 3.00 x10*3/uL    Monocytes Absolute 0.74 0.05 - 0.80 x10*3/uL    Eosinophils Absolute 0.19 0.00 - 0.40 x10*3/uL    Basophils Absolute 0.00 0.00 - 0.10 x10*3/uL   ECG 12 Lead   Result Value Ref Range    Ventricular Rate 64 BPM    Atrial Rate 267 BPM    QRS Duration 80 ms    QT Interval 418 ms    QTC Calculation(Bazett) 431 ms    P Axis -11 degrees    R Axis 46 degrees    T Axis 56 degrees    QRS Count 11 beats    Q Onset 228 ms    P Onset 158 ms    P Offset 186 ms    T Offset 437 ms    QTC Fredericia 427 ms   Basic Metabolic Panel   Result Value Ref Range    Glucose 84 74 - 99 mg/dL    Sodium 140 136 - 145 mmol/L    Potassium 4.8 3.5 - 5.3 mmol/L    Chloride 110 (H) 98 - 107 mmol/L    Bicarbonate 22 21 - 32 mmol/L    Anion Gap 13 10 - 20 mmol/L    Urea Nitrogen 47 (H) 6 - 23 mg/dL    Creatinine 3.62 (H) 0.50 - 1.30 mg/dL    eGFR 16 (L) >60 mL/min/1.73m*2    Calcium 8.1 (L) 8.6 - 10.3 mg/dL   CBC and Auto Differential   Result Value Ref Range    WBC 4.5 4.4 - 11.3 x10*3/uL    nRBC 0.0 0.0 - 0.0 /100 WBCs    RBC 2.45 (L) 4.50 - 5.90 x10*6/uL    Hemoglobin 7.1 (L) 13.5 - 17.5 g/dL    Hematocrit 23.3 (L) 41.0 - 52.0 %    MCV 95 80 - 100 fL    MCH 29.0 26.0 - 34.0 pg    MCHC 30.5 (L) 32.0 - 36.0 g/dL    RDW 16.4 (H) 11.5 - 14.5 %    Platelets 150 150 - 450 x10*3/uL    Neutrophils % 66.1 40.0 - 80.0 %    Immature Granulocytes %, Automated 1.8 (H) 0.0 - 0.9 %    Lymphocytes % 9.7 13.0 - 44.0 %    Monocytes % 20.2 2.0 - 10.0 %    Eosinophils % 2.0 0.0 - 6.0 %    Basophils % 0.2 0.0 - 2.0 %    Neutrophils Absolute 2.94 1.60 - 5.50 x10*3/uL    Immature Granulocytes Absolute, Automated 0.08 0.00 - 0.50 x10*3/uL    Lymphocytes Absolute 0.43 (L) 0.80 - 3.00 x10*3/uL    Monocytes Absolute 0.90 (H) 0.05 - 0.80 x10*3/uL    Eosinophils Absolute 0.09 0.00 - 0.40 x10*3/uL    Basophils Absolute 0.01 0.00 - 0.10 x10*3/uL   SST TOP    Result Value Ref Range    Extra Tube Hold for add-ons.    Vancomycin   Result Value Ref Range    Vancomycin 14.2 5.0 - 20.0 ug/mL   Basic Metabolic Panel   Result Value Ref Range    Glucose 81 74 - 99 mg/dL    Sodium 139 136 - 145 mmol/L    Potassium 4.5 3.5 - 5.3 mmol/L    Chloride 108 (H) 98 - 107 mmol/L    Bicarbonate 23 21 - 32 mmol/L    Anion Gap 13 10 - 20 mmol/L    Urea Nitrogen 47 (H) 6 - 23 mg/dL    Creatinine 3.67 (H) 0.50 - 1.30 mg/dL    eGFR 15 (L) >60 mL/min/1.73m*2    Calcium 8.2 (L) 8.6 - 10.3 mg/dL   CBC and Auto Differential   Result Value Ref Range    WBC 5.3 4.4 - 11.3 x10*3/uL    nRBC 0.0 0.0 - 0.0 /100 WBCs    RBC 2.50 (L) 4.50 - 5.90 x10*6/uL    Hemoglobin 7.4 (L) 13.5 - 17.5 g/dL    Hematocrit 24.4 (L) 41.0 - 52.0 %    MCV 98 80 - 100 fL    MCH 29.6 26.0 - 34.0 pg    MCHC 30.3 (L) 32.0 - 36.0 g/dL    RDW 16.4 (H) 11.5 - 14.5 %    Platelets 155 150 - 450 x10*3/uL    Neutrophils % 67.2 40.0 - 80.0 %    Immature Granulocytes %, Automated 3.2 (H) 0.0 - 0.9 %    Lymphocytes % 8.5 13.0 - 44.0 %    Monocytes % 18.1 2.0 - 10.0 %    Eosinophils % 2.8 0.0 - 6.0 %    Basophils % 0.2 0.0 - 2.0 %    Neutrophils Absolute 3.57 1.60 - 5.50 x10*3/uL    Immature Granulocytes Absolute, Automated 0.17 0.00 - 0.50 x10*3/uL    Lymphocytes Absolute 0.45 (L) 0.80 - 3.00 x10*3/uL    Monocytes Absolute 0.96 (H) 0.05 - 0.80 x10*3/uL    Eosinophils Absolute 0.15 0.00 - 0.40 x10*3/uL    Basophils Absolute 0.01 0.00 - 0.10 x10*3/uL   SST TOP   Result Value Ref Range    Extra Tube Hold for add-ons.    Vancomycin, Trough   Result Value Ref Range    Vancomycin, Trough 14.5 5.0 - 20.0 ug/mL     ECG 12 Lead    Result Date: 6/17/2024  atypical  Atrial flutter  / atrial tachycardia Low voltage QRS Nonspecific ST abnormality Abnormal ECG When compared with ECG of 14-JUN-2024 14:30, Vent. rate has decreased BY  62 BPM Non-specific change in ST segment in Inferior leads Confirmed by Yadira Hoskins ([id number]) on 6/17/2024 7:08:58 PM    ECG  12 lead    Result Date: 6/15/2024  Accelerated Junctional rhythm Low voltage QRS Nonspecific ST abnormality Abnormal ECG When compared with ECG of 30-MAY-2024 08:33, Junctional rhythm has replaced Atrial flutter See ED provider note for full interpretation and clinical correlation Confirmed by Chavo Cid ([id number]) on 6/15/2024 11:11:58 AM    US scrotum w doppler    Result Date: 6/14/2024  Interpreted By:  Julieta Callahan, STUDY: US SCROTUM WITH DOPPLER; 6/14/2024 4:37 pm   INDICATION: Swelling, pain, and erythema.   COMPARISON: None.   ACCESSION NUMBER(S): [id number]   ORDERING CLINICIAN: QUE GUZMAN   TECHNIQUE: Gray scale and color doppler imaging of the scrotum was performed with spectral waveform analysis. Arterial inflow and venous outflow documented. Duplex Doppler interrogation including color flow and spectral waveform analysis is performed.   FINDINGS: Right testis: 3.4 x 2.0 x 3.2 cm.   Left testis: 4.9 x 2.9 x 3.0 cm.   Epididymides: The epididymides are normal in size and echogenicity. There is a 2 x 3 x 3 mm cyst within right epididymal head.   The testes appear homogeneous with no intratesticular lesions. Ectasia of the rete testes on the right is present. Duplex Doppler interrogation of each testicle including color flow and spectral waveform analysis shows normal arterial and venous flow without torsion or orchitis.   There is a 3.6 x 6.0 x 3.0 encapsulated fluid collection anterior and superior to the right testicle containing low-level internal echoes and debris. This does not arise from the right epididymis. This may represent a scrotal wall abscess. There appears to be diffuse thickening of the scrotal wall.       3.0 x 3.6 x 6.0 cm encapsulated fluid collection noted which is extratesticular in location and is seen anterior and superior to the right testicle. This contains low-level internal echoes and some thickening of its wall with internal debris. This may represent a scrotal abscess.  Clinical correlation is necessary.   Ectasia of the rete testes on the right.   3 mm cyst right epididymal head.   No epididymal orchitis.   MACRO: None.   Signed by: Julieta Callahan 6/14/2024 4:59 PM Dictation workstation:   [id number]    XR chest 2 views    Result Date: 6/14/2024  Interpreted By:  Josh Soto, STUDY: XR CHEST 2 VIEWS;  6/14/2024 3:10 pm   INDICATION: Signs/Symptoms:Cough.   COMPARISON: 05/28/2024   ACCESSION NUMBER(S): [id number]   ORDERING CLINICIAN: WES ORTIZ   FINDINGS: Pacemaker leads are intact and properly positioned.       CARDIOMEDIASTINAL SILHOUETTE: Cardiomediastinal silhouette is normal in size and configuration.   LUNGS: There is complete opacification left hemithorax with volume loss and shift of the mediastinum to the left.   Since the prior examination further increase in density of the right lung base which may suggest atelectasis or infiltrate. Correlation with auscultation and inflammatory markers recommended. A small to moderate on right pleural effusion persists.   ABDOMEN: No remarkable upper abdominal findings.   BONES: No acute osseous changes.       1.  Complete opacification with volume loss left hemithorax. Increased density right lung base reflecting small to moderate right pleural effusion. Superimposed pneumonia can not be excluded.       MACRO: None   Signed by: Josh Soto 6/14/2024 3:36 PM Dictation workstation:   [id number]    CT abdomen pelvis wo IV contrast    Result Date: 6/14/2024  Interpreted By:  Schoenberger, Joseph, STUDY: CT ABDOMEN PELVIS WO IV CONTRAST;  6/14/2024 2:55 pm   INDICATION: Signs/Symptoms:Scrotal swelling, erythema, erythema extending up into the abdomen.   COMPARISON: 05/22/2024   ACCESSION NUMBER(S): [id number]   ORDERING CLINICIAN: WES ORTIZ   TECHNIQUE: CT of the abdomen and pelvis was performed. Contiguous axial images were obtained at 3 mm slice thickness through the abdomen and pelvis. Coronal and sagittal  reconstructions at 3 mm slice thickness were performed.  No intravenous or oral contrast agents were administered.   FINDINGS: Please note that the evaluation of vessels, lymph nodes and organs is limited without intravenous contrast.   LOWER CHEST: There is some persistent volume loss in the left lower lobe with persistent consolidation. There are few air bronchograms. There is rounded collection of fluid at the anterior aspect of this process which may relate to either necrotizing pneumonia or loculated pleural fluid or conceivably pulmonary abscess. However this is also unchanged from the prior. There is a however, a new moderate dependent layering right pleural effusion.   ABDOMEN:   LIVER: 2 cysts located in the superior aspect the left liver lobe are stable from the prior. No other focal abnormality.   BILE DUCTS: No dilation   GALLBLADDER: Dependent layering calcified gallstones. No wall thickening or pericholecystic fluid.   PANCREAS: Unremarkable   SPLEEN: Unremarkable   ADRENAL GLANDS: Bilateral adrenal glands appear normal.   KIDNEYS AND URETERS: The kidneys are normal in size and unremarkable in appearance.  No hydroureteronephrosis or nephroureterolithiasis is identified. 5 mm nonobstructing lower pole right renal stone unchanged.   PELVIS:   BLADDER: High choose 1   REPRODUCTIVE ORGANS: Unremarkable   BOWEL: The stomach is unremarkable.  Small bowel loops are normal in caliber without mural thickening. Colon is normal in caliber. There is a mobile cecum. There are multiple colonic diverticula. There is no convincing evidence for acute diverticulitis. Normal appendix.   VESSELS: There is no aneurysmal dilatation of the abdominal aorta. The IVC appears normal.   PERITONEUM/RETROPERITONEUM/LYMPH NODES: There is no free or loculated fluid collection, no free intraperitoneal air. The retroperitoneum appears normal.  No abdominopelvic lymphadenopathy is present. The   ABDOMINAL WALL: There is soft tissues  Amanda in the abdominal wall which is new from the prior. This is a set metric Roseann more pronounced on the left than on the right. This extends into the lower abdominal left-sided pannus. It does not appear to overtly involve the scrotum. There may be loculated right-sided hydrocele.   BONES: No suspicious osseous lesions are identified. 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       1.  The high significant development of abdominal wall edema in the subcutaneous tissues asymmetrically worse on the left. This does not appear to especially extend into the scrotum. 2. There is a new moderate right pleural effusion. 3. Persistent left basal consolidation with anterior oval shaped collection of fluid with similar appearance to prior other than lack of air bronchograms on this exam compared to the prior. Associated volume loss.     MACRO: None   Signed by: Joseph Schoenberger 6/14/2024 3:30 PM Dictation workstation:   [id number]    US renal complete    Result Date: 6/8/2024  Interpreted By:  Ryan Best and Weaver Jakob STUDY: US RENAL COMPLETE;  6/6/2024 5:51 pm   INDICATION: Signs/Symptoms:VIK.   COMPARISON: None.   ACCESSION NUMBER(S): [id number]   ORDERING CLINICIAN: ADRYAN SIMON   TECHNIQUE: Multiple images of the kidneys were obtained.   FINDINGS: RIGHT KIDNEY: The right kidney measures 11.8 cm in length. The renal cortical echogenicity is increased. Renal cortical thickness is mildly decreased. No hydronephrosis is present; no evidence of nephrolithiasis.   LEFT KIDNEY: The left kidney is poorly visualized, measuring approximately 11.8 cm. Renal cortical echogenicity is increased. No apparent hydronephrosis or nephrolithiasis.   BLADDER: The urinary bladder is unremarkable in appearance. Poorly visualized small volume free fluid in the left upper quadrant.       Examination is somewhat limited due to patient body habitus, positioning. Within this limitation:  Increased renal cortical echogenicity bilaterally which may relate to medical renal disease. No hydronephrosis or nephrolithiasis.   I personally reviewed the images/study and I agree with the findings as stated. This study was interpreted at [facility], [place], [place].   MACRO: None   Signed by: Ryan Urena 6/8/2024 3:02 AM Dictation workstation:   [id number]    ECG 12 Lead    Result Date: 5/31/2024  Atrial flutter with variable AV block with premature ventricular or aberrantly conducted complexes Low voltage QRS Abnormal ECG When compared with ECG of 28-MAY-2024 01:40, Atrial flutter has replaced Atrial fibrillation Nonspecific T wave abnormality, improved in Inferior leads Confirmed by Jovanny Loera ([id number]) on 5/31/2024 1:51:33 PM    Flexible Sigmoidoscopy    Result Date: 5/31/2024  Table formatting from the original result was not included. Impression Dark red blood visualized in the rectum. Blood was cleared without any active bleeding appreciated nor underlying mucosal changes. Few small and large, scattered diverticula with no inflammation in the sigmoid colon; no bleeding was identified. Diverticula were irrigated without any bleeding or stigmata of recent bleed appreciated. All observed locations appeared normal, including the sigmoid colon, rectosigmoid and rectum. No mucosal abnormalities. Normal on retroflexion. Green bilious stool proximal to the rectum, most notably in the proximal descending colon and transverse colon, without any blood, blood clots or hematin. Findings Dark red blood visualized in the rectum. Blood was cleared without any active bleeding appreciated nor underlying mucosal changes. Few small and large, scattered diverticula with no inflammation in the sigmoid colon; no bleeding was identified. Diverticula were irrigated without any bleeding or stigmata of recent bleed appreciated. All observed locations appeared normal, including the  sigmoid colon, rectosigmoid and rectum. No mucosal abnormalities. Normal on retroflexion. Green bilious stool proximal to the rectum, most notably in the proximal descending colon and transverse colon, without any blood, blood clots or hematin. Recommendation  Follow up with primary gastroenterologist  Follow up with inpatient GI consult service, Dr. Valenzuela and Dr. 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 The patient's tolerance of previous anesthesia has been reviewed. The risks and benefits of the procedure and the sedation options and risks were discussed with the patient. All questions were answered and informed consent obtained. Details of the Procedure The patient underwent no sedation. The patient's blood pressure, ECG, heart rate, level of consciousness, oxygen and respirations were monitored throughout the procedure. A digital rectal exam was performed. A perianal exam was performed. The scope was introduced through the anus and advanced to the transverse colon. Retroflexion was performed in the rectum. The quality of bowel preparation was evaluated using the [place] Bowel Preparation Scale with scores of: left colon = 3. Bowel prep was not adequate. The patient experienced no blood loss. The procedure was not difficult. The patient tolerated the procedure well. There were no  apparent adverse events. Events Procedure Events Event Event Time ENDO SCOPE IN TIME 5/24/2024  1:08 PM ENDO SCOPE OUT TIME 5/24/2024  1:26 PM Specimens No specimens collected Procedure Location [facility] [address] [facility] [address] [phone] Referring Provider Grayson Nichols, APRN- [address] [address] Procedure Provider Ale Archibald MD     Lower extremity venous duplex left    Result Date: 5/31/2024  Interpreted By:  Nathan Argueta and Weaver Jakob STUDY: [facility] US LOWER EXTREMITY VENOUS DUPLEX LEFT;  5/30/2024 3:58 pm   INDICATION: Signs/Symptoms:LLE swelling, eval for DVT.   COMPARISON: None.   ACCESSION NUMBER(S): [id number]   ORDERING CLINICIAN: ALEXANDRA AVILA   TECHNIQUE: Vascular ultrasound of the left lower extremity was performed. Real-time compression views as well as Gray scale, color Doppler and spectral Doppler waveform analysis was performed.   FINDINGS: Evaluation of the visualized portions of the left common femoral vein, proximal, mid, and distal femoral vein, and popliteal vein were performed.  Evaluation of the visualized portions of the  posterior tibial and peroneal veins were also performed. Evaluation of the calf veins limited due to edema.   Echogenic intraluminal material in the proximal greater saphenous vein with partial compressibility and color Doppler signal The evaluated veins demonstrate normal compressibility. There is intact venous flow demonstrating normal respiratory variability and normal augmentation of flow with calf compression. Therefore, there is no ultrasonographic evidence for deep vein thrombosis within the evaluated veins.       1.  No sonographic evidence for deep vein thrombosis within the evaluated veins of the left lower extremity. 2. Nonocclusive thrombus of the proximal greater saphenous vein, a superficial vein. Recommend correlation with concern for  superficial thrombophlebitis.   I personally reviewed the images/study and I agree with the findings as stated by Agustín Cheatham MD. This study was interpreted at [facility], [place], [place].   MACRO: None   Signed by: Nathan Argueta 5/31/2024 5:32 AM Dictation workstation:   [id number]    ECG 12 Lead    Result Date: 5/30/2024  Atrial flutter with variable AV block Rightward axis Low voltage QRS Nonspecific ST and T wave abnormality Abnormal ECG When compared with ECG of 16-MAY-2024 21:13, Significant changes have occurred Confirmed by Jovanny Loera ([id number]) on 5/30/2024 11:03:21 AM    US chest    Result Date: 5/29/2024  Interpreted By:  Dinh Plunkett, STUDY: US CHEST; 5/29/20243:53 pm   INDICATION: Signs/Symptoms:Thoracentesis (Left) diagnostic/therapeutic.   COMPARISON: None.   ACCESSION NUMBER(S): [id number]   ORDERING CLINICIAN: ALEXANDRA AVILA   TECHNIQUE: INTERVENTIONALIST(S): Dinh Plunkett   CONSENT: The patient/patient's POA/next of kin was informed of the nature of the proposed procedure. The purposes, alternatives, risks, and benefits were explained and discussed. All questions were answered and consent was obtained.   SEDATION: None   TIME OUT: A time out was performed immediately prior to procedure start with the interventional team, correctly identifying the patient name, date of birth, MRN, procedure, anatomy (including marking of site and side), patient position, procedure consent form, relevant laboratory and imaging test results, antibiotic administration, safety precautions, and procedure-specific equipment needs.   FINDINGS: The patient was placed in the supine position.   The thoracic space was examined with grey scale ultrasound, and an absence of free fluid was demonstrated on ultrasound. Thoracic images were captured to demonstrate. A thoracentesis was not performed.       Absence of free fluid for procedure. A thoracentesis was not performed.   I  personally performed and/or directly supervised this study and was present for the entire procedure.   Performed and dictated at [facility].   Signed by: Dinh Plunkett 5/29/2024 3:53 PM Dictation workstation:   [id number]    XR chest 1 view    Result Date: 5/28/2024  Interpreted By:  Jovany Nuñez, STUDY: XR CHEST 1 VIEW;  5/28/2024 8:03 am   INDICATION: Signs/Symptoms:Rule out infection.   COMPARISON: Chest radiograph dated 5/27/2024   ACCESSION NUMBER(S): [id number]   ORDERING CLINICIAN: ALEXANDRA AVILA   FINDINGS: AP radiograph of the chest   Pacemaker electrodes appear in adequate position. There is virtually complete opacification of the left thorax. The heart mediastinum are shifted to the left indicating volume loss. Compensatory hyperaeration of the right lung and pulmonary vascular shunting to the right lung is noted increased from previous study.         1. Virtually complete opacification of the left thorax with heart and mediastinum shifted to the left indicating volume loss 2. Compensatory pulmonary vascular shunting to the right lung       Signed by: Jovany Nuñez 5/28/2024 10:38 AM Dictation workstation:   [id number]    Electrocardiogram, 12-lead PRN ACS symptoms    Result Date: 5/28/2024  Atrial fibrillation with rapid ventricular response Nonspecific ST and T wave abnormality , probably digitalis effect Abnormal ECG When compared with ECG of 28-MAY-2024 01:39, (unconfirmed) No significant change was found Confirmed by Jovanny Loera ([id number]) on 5/28/2024 10:17:48 AM    FL modified barium swallow study    Result Date: 5/26/2024  Interpreted By:  Sohail Torres and Riley Laura STUDY: FL MODIFIED BARIUM SWALLOW STUDY;; 5/25/2024 9:06 am   INDICATION: Signs/Symptoms:r\o dysphagia.   COMPARISON: None.   ACCESSION NUMBER(S): [id number]   ORDERING CLINICIAN: ALEXANDRA AVILA   TECHNIQUE: MBSS completed. Informed verbal consent obtained prior to completion of  exam. Trials of thin, nectar thick,  puree, and regular solids given. Fluoroscopy time :  2 minutes.   SLP: Meri Hurley MS CCC/SLP Phone/Pager: Epic Amira   SPEECH FINDINGS: Reason for referral: Further assessment of oropharyngeal swallow Patient hx: Recent RLL PNA. S/s of aspiration w/3 oz Swallow Protocol Respiratory status: Nasal cannula Previous diet: Reg/thin   FINAL SPEECH RECOMMENDATIONS   Diet recommendations/feeding strategies: Solid Diet Recommendations : Easy to Chew Liquid Diet Recommendations: Thin Medication Administration: Single w/ sips of water, whole in puree as needed   Safe Swallow Strategies/Guidelines: Only present PO intake when awake and alert Supervision/assist w/ PO intake to assure adherence to safe swallow strategies Upright positioning Slow rate/small boluses   Follow-up speech therapy recommended: Yes.   Short term goals: Pt will tolerate least restrictive diet with adequate oral phase and no s/s of aspiration. Date initiated: 5/25/24 Status: Goal initiated   Pt will adhere to safe swallow strategies during PO intake with > 90% accuracy independently. Date initiated: 5/25/24 Status: Goal initiated   Education provided: Yes.     Mechanics of the swallow summary: *Oral phase: Mild deficits.   Adequate bolus acceptance.  Prolonged mastication and bolus formation/transit w/ regular solids.  No oral residue.   *Pharyngeal phase: Mild deficits.   Incomplete epiglottic inversion, may be related to presence of NGT. Timely swallow initiation.  Adequate hyolaryngeal elevation/excursion.  Trace penetration x1 with consecutive boluses of thin liquids related to incomplete airway protection.  Ejected upon swallow completion.  No other penetration and no aspiration with any other consistency assessed.  Mild residue at the valleculae following the swallow.  Largely cleared with liquid wash.  Mildly reduced distention of PES.     SLP impressions with severity rating: Mild oropharyngeal dysphagia  characterized by prolonged mastication/bolus formation and mild residue at the valleculae following the swallow.   Thin Liquids (MBSS) Rosenbek's Penetration Aspiration Scale, Thin Liquids (MBSS): 2. PENETRATION that CLEARS - contrast enter airway, above vocal cords, no residue     Nectar Thick Liquids (MBSS) Rosenbek's Penetration Aspiration Scale, Nectar thick liquids (MBSS): 1. NO ASPIRATION & NO PENETRATION - no aspiration, contrast does not enter airway       Purees (MBSS) Rosenbek's Penetration Aspiration Scale, Purees (MBSS): 1. NO ASPIRATION & NO PENETRATION - no aspiration, contrast does not enter airway     Solids (MBSS) Rosenbek's Penetration Aspiration Scale, Solids (MBSS): 1. NO ASPIRATION & NO PENETRATION - no aspiration, contrast does not enter airway     Speech Therapy section of this report signed by Meri Hurley MS CCC/SLP on 5/25/2024 at 10:17 am.   RADIOLOGY FINDINGS: Nasoenteric tube is partially visualized. No evidence of acute osseous abnormality of the cervical spine. Patient is edentulous.   Radiology section of this report signed by Dr. Torres.       1. No radiographic evidence of acute process in the neck. 2. Please refer to speech pathology report for additional findings.   MACRO: None   Signed by: Soahil Torres 5/26/2024 3:51 PM Dictation workstation:   [id number]    XR abdomen 1 view    Result Date: 5/25/2024  Interpreted By:  Marcie Shah, STUDY: XR ABDOMEN 1 VIEW; 5/25/2024 5:45 pm   INDICATION: Signs/Symptoms:ngt placed.   COMPARISON: Radiograph dated 05/25/2024, 2:33 p.m.   ACCESSION NUMBER(S): [id number]   ORDERING CLINICIAN: ALEXANDRA AVILA   FINDINGS: Single-view of the abdomen. The pelvis is not included. Enteric tube is in place with the tip projecting over the stomach. Positive contrast is identified in the gastric fundus. Prominent loops of bowel throughout the visualized upper abdomen. Nonobstructive bowel gas pattern.  Limited evaluation of  pneumoperitoneum on supine imaging, however no gross evidence of free air is noted.   Extensive consolidation in visualized portion of the left lung.   Osseous structures demonstrate no acute bony changes.       1.  Enteric tube projecting over the proximal stomach. 2. Again seen multiple prominent loops of bowel throughout the upper abdomen. Correlation with ileus. Recommend follow-up radiograph.   Signed by: Marcie Rodriguez 5/25/2024 5:54 PM Dictation workstation:   [id number]    XR abdomen 1 view    Result Date: 5/25/2024  Interpreted By:  Marcie Shah, STUDY: XR ABDOMEN 1 VIEW; 5/25/2024 3:06 pm   INDICATION: Signs/Symptoms:distended abdomen, assess for dilated bowel loops.   COMPARISON: Radiograph dated 05/21/2024   ACCESSION NUMBER(S): [id number]   ORDERING CLINICIAN: ALEXANDRA AVILA   FINDINGS: Views of the abdomen and pelvis. What is presumed to be enteric tube is projecting over the lower mediastinum. Positive contrast is identified in the expected location of the stomach. There is also positive contrast throughout the loops of bowel in the lower abdomen. Prominent loops of colon. Limited evaluation of pneumoperitoneum on supine imaging, however no gross evidence of free air is noted.   Consolidative opacities in left lung.   Osseous structures demonstrate no acute bony changes.       1.  Prominent loops of colon which can be due to ileus. Recommend follow-up radiograph.   Signed by: Marcie Rodriguez 5/25/2024 5:33 PM Dictation workstation:   [id number]    XR chest 1 view    Result Date: 5/23/2024  Interpreted By:  Jovany Nuñez and Baker Zachary STUDY: XR CHEST 1 VIEW; ;  5/22/2024 6:45 pm   INDICATION: Signs/Symptoms:Worsening tachypnea, confusion.   COMPARISON: Chest radiograph on 05/21/2024.   ACCESSION NUMBER(S): [id number]   ORDERING CLINICIAN: SUN CHAMPAGNE   FINDINGS: Single AP view of the chest.   Right subclavian vein approach pacemaker/AICD in place with leads projecting  over the right atrium and ventricle. Enteric tube coursing below diaphragm tip overlying the expected position of the gastric body.   The cardiomediastinal silhouette is obscured, similar to prior exam.   Persistent near-complete opacification of the left hemithorax, with mild interval increase in air bronchograms within the left upper lung zone. Redemonstration of patchy right infrahilar airspace opacities and interstitial prominence on the right. No right-sided pleural effusion or pneumothorax.   No acute osseous abnormality.       1. Persistent near-complete opacification of the left hemithorax, with mild interval increase in air bronchograms within the left upper lung zone. 2. Persistent patchy right infrahilar airspace opacities and interstitial prominence throughout the right lung, which may represent aspiration/pneumonia in the appropriate clinical setting. 3. Medical devices as above.   I personally reviewed the images/study and I agree with the findings as stated by Dr. Raymond Armstrong M.D. This study was interpreted at [place], [place].   MACRO: None   Signed by: Jovany Nuñez 5/23/2024 7:32 AM Dictation workstation:   [id number]    CT chest abdomen pelvis wo IV contrast    Result Date: 5/23/2024  Interpreted By:  Sohail Torres,  and Jeff Ibrahim STUDY: CT CHEST ABDOMEN PELVIS WO CONTRAST;  5/22/2024 12:15 pm   INDICATION: Signs/Symptoms:c/f sepsis.   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 N/V/D with [employer] concernign for UPI GIB and ongoing fevers.   COMPARISON: CT chest/abdomen/pelvis 05/18/2024   ACCESSION NUMBER(S): [id number]   ORDERING CLINICIAN: SUN CHAMPAGNE   TECHNIQUE: CT of the chest, abdomen and pelvis was performed. Contiguous axial images were obtained at 3 mm slice thickness through the chest, abdomen and  pelvis. Coronal and sagittal reconstructions at 3 mm slice thickness were performed.  No intravenous contrast was administered; positive oral contrast was given.   FINDINGS: Please note that the study is limited without intravenous contrast.   Respiratory motion artifact.   CHEST:   LUNG/PLEURA/LARGE AIRWAYS: Trachea and bilateral mainstem bronchi are patent.   Again seen complete opacification of the left hemithorax with air bronchograms consistent with significant collapse of the left lung.   There is left pleural thickening (example series 2, image 38) with a focus of left posterior chest wall extension at the level of the 9th-10th (series 2, image 71) and 10th-11th (series 2, image 82) rib spaces, similar to prior, findings consistent with patient's known mesothelioma.   Similar small loculated pleural effusion measuring 7.1 x 4.1 x 2.3 cm (series 2, image 66 and series 900, image 62).   Interval improvement of right lower lobe ill-defined patchy infiltrate (series 4, image 143). Slight interval increase in right trace pleural effusion. No new focal consolidations. No evidence of pneumothorax.   VESSELS: The previously noted ill-defined hypodensity in the left pulmonary artery is not well evaluated on this exam due to beam hardening artifact, positioning of patient's arms, and lack of venous contrast.   Dilated main pulmonary artery measuring 3.8 cm (series 2, image 40), similar to prior. Ectatic ascending aorta measuring 4.1 cm, similar to prior.  Mild to moderate coronary artery calcifications are present.   HEART: The heart is normal in size.  Trace pericardial fluid, similar to prior. Right-sided subclavian approach dual-chambercardiac ICD/pacemaker, with the leads in the right atrium and right ventricle.   MEDIASTINUM AND ANDREA: Multiple prominent mediastinal and perihilar lymph nodes similar to prior. For example:   1.3 cm subcarinal lymph node (series 2, image 48).   1 cm perihilar lymph node (series 2,  image 33).   Enteric tube courses through the esophagus and terminates in the body of the stomach. Otherwise otherwise esophagus is within normal limits.   CHEST WALL AND LOWER NECK: Right chest wall cardiac pacemaker as described above. The visualized thyroid gland appears within normal limits.   ABDOMEN:   LIVER: The liver is normal in size. Similar hypoattenuating lesions with the largest measuring 2 cm segment 4A (series 2, image 71).   BILE DUCTS: The bile ducts are not dilated.   GALLBLADDER: The gallbladder is not distended. Calcified stones are noted.   PANCREAS: The pancreas appears unremarkable.   SPLEEN: Within normal limits.   ADRENAL GLANDS: Bilateral adrenal glands appear normal.   KIDNEYS AND URETERS: Bilateral mildly atrophic kidneys with mild perinephric stranding similar to prior. There is a 0.6 cm left midpole nonobstructing renal calculi. No hydroureteronephrosis. No right nephroureterolithiasis.   PELVIS:   BLADDER: The urinary bladder is underdistended with Forrest catheter in place. There is air in the urinary bladder likely from the Forrest.   REPRODUCTIVE ORGANS: No pelvic masses.   BOWEL: NG tube body of the stomach. Otherwise, the stomach is unremarkable. The small is normal in caliber and demonstrate no wall thickening. Proximal sigmoid diverticulosis with interval improvement in the mild wall thickening and adjacent fat stranding involving the proximal sigmoid colon (series 902, image 93). Interval insertion of rectal tube.   VESSELS: Mild atherosclerosis of the abdominal aorta and its branching vessels. There is no aneurysmal dilatation of the abdominal aorta..   Interval flattening of the inferior vena cava (image 2, image 121)/   PERITONEUM/RETROPERITONEUM/LYMPH NODES: No ascites or free air, no fluid collection.  The retroperitoneum appears unremarkable.  No enlarged mesenteric lymph nodes.   ABDOMINAL WALL: Within normal limits.   BONES: No suspicious osseous lesions are present.  Degenerative discogenic disease is noted in the lower thoracic and lumbar spine most severe at L3-L4, L4-L5, and L5-L6..       Chest 1. Interval improvement of the right lower lobe patchy infiltrate. 2. Again seen left lung collapse, left pleural thickening with invasion into the left posterior chest wall, and small left loculated pleural effusion consistent with patient's known mesothelioma and is unchanged when compared to prior CT from 05/18/2024. 3. Unchanged prominent mediastinal and perihilar lymph nodes. 4. Previously noted left pulmonary artery thrombus is not well evaluated on this exam due to beam hardening artifact, positioning of patient's arms, and lack of intravenous contrast.   Abdomen-Pelvis 1. Interval flattening of the inferior vena cava which may be seen with hypovolemia. Recommend correlation with patient's fluid volume status. 2. Improved proximal sigmoid diverticulitis. 3. Additional unchanged findings as noted above.   I personally reviewed the images/study and I agree with the findings as stated by Patrice Aguilar DO, PGY-2. This study was interpreted at [facility], [place], [place].   MACRO: None   Signed by: Sohail Torres 5/23/2024 12:22 AM Dictation workstation:   [id number]    Transthoracic Echo (TTE) Complete    Result Date: 5/21/2024   [facility], [address] [place] [address]                Tel [phone] and Fax [phone] TRANSTHORACIC ECHOCARDIOGRAM REPORT  Patient Name:      ZAKIYAJITENDRA LAWSON       Reading Physician:    [id number] Miller MD Study Date:        5/21/2024            Ordering Provider:    [id number] CHAMPAGNE MRN/PID:           [id number]             Fellow: Accession#:        [id number]         Nurse: Date of Birth/Age: 1935 / 88 years Sonographer:          Dionisio  Siupinys                                                               [facility] Gender:            M                    Additional Staff: Height:            172.72 cm            Admit Date: Weight:            109.77 kg            Admission Status:     Inpatient -                                                               Routine BSA / BMI:         2.22 m2 / 36.80      Encounter#:           [id number]                    kg/m2                                         Department Location:  [place] [address] Blood Pressure: 96 /57 mmHg Study Type:    TRANSTHORACIC ECHO (TTE) COMPLETE Diagnosis/ICD: Unspecified atrial fibrillation-I48.91; Hypertensive heart                disease with heart failure-I11.0 Indication:    hypoxic resp failure w/ new o2 requirement c/f HF CPT Code:      Echo Complete w Full Doppler-[id number] Patient History: Pertinent History: PAD, afib/flutter, hx DVT on warfarin, HTN, CAD s/p stent,                    mitral regurg, HLD,JOVANNI, basal cell ca on face, s/p removal                    and radiation of malignant mesothelioma, S/p L VATS with                    decort 5/2022. Study Detail: The following Echo studies were performed: 2D, M-Mode, color flow               and Doppler. Technically challenging study due to body habitus,               patient lying in supine position, poor acoustic windows and               prominent lung artifact. Definity used as a contrast agent for               endocardial border definition. Total contrast used for this               procedure was 2.0 mL via IV push. Unable to obtain suprasternal               notch view.  PHYSICIAN INTERPRETATION: Left Ventricle: The left ventricular systolic function is hyperdynamic, with an estimated ejection fraction of 70-75%. The patient is in atrial fibrillation which may influence the estimate of left ventricular function and transvalvular flows. There are no regional wall motion abnormalities. The left ventricular cavity size is normal.  Left ventricular diastolic filling was not assessed. Left Atrium: The left atrium was not well visualized. Right Ventricle: The right ventricle was not well visualized. Unable to determine right ventricular systolic function. Right Atrium: The right atrium was not well visualized. Aortic Valve: The aortic valve is trileaflet. There is trivial aortic valve regurgitation. The peak instantaneous gradient of the aortic valve is 13.2 mmHg. Mitral Valve: The mitral valve is normal in structure. There is mild mitral annular calcification. There is trace mitral valve regurgitation. Tricuspid Valve: The tricuspid valve was not well visualized. Tricuspid regurgitation was not well visualized. Tricuspid regurgitation was not assessed. The Doppler estimated RVSP is mildly elevated at 36.6 mmHg. Pulmonic Valve: The pulmonic valve is not well visualized. There is physiologic pulmonic valve regurgitation. Pericardium: There is a trivial pericardial effusion. Aorta: The aortic root is abnormal. The aortic root appears moderately dilated and measures 4.50 cm. The Asc Ao is 3.70 cm. There is mild dilatation of the ascending aorta. Systemic Veins: The inferior vena cava appears to be of normal size. There is IVC inspiratory collapse greater than 50%. In comparison to the previous echocardiogram(s): Compared with study from 5/3/2022, the previous study was a MARY during a MARY/DCCV and comparison is limited.  CONCLUSIONS:  1. Poorly visualized anatomical structures due to suboptimal image quality.  2. Left ventricular systolic function is hyperdynamic with a 70-75% estimated ejection fraction.  3. The patient is in atrial fibrillation which may influence the estimate of left ventricular function and transvalvular flows.  4. Mildly elevated RVSP.  5. Moderately dilated aortic root. QUANTITATIVE DATA SUMMARY: 2D MEASUREMENTS:                          Normal Ranges: Ao Root d:     4.50 cm   (2.0-3.7cm) LAs:           2.50 cm   (2.7-4.0cm)  IVSd:          0.80 cm   (0.6-1.1cm) LVPWd:         0.80 cm   (0.6-1.1cm) LVIDd:         4.10 cm   (3.9-5.9cm) LVIDs:         2.20 cm LV Mass Index: 43.9 g/m2 LV % FS        46.3 % AORTA MEASUREMENTS:                    Normal Ranges: Asc Ao, d: 3.70 cm (2.1-3.4cm) LV SYSTOLIC FUNCTION BY 2D PLANIMETRY (MOD):                     Normal Ranges: EF-A4C View: 73.6 % (>=55%) EF-A2C View: 80.4 % EF-Biplane:  78.8 % LV DIASTOLIC FUNCTION:                     Normal Ranges: MV Peak E: 1.09 m/s (0.7-1.2 m/s) MV DT:     245 msec (150-240 msec) MITRAL VALVE:                 Normal Ranges: MV DT: 245 msec (150-240msec) AORTIC VALVE:                          Normal Ranges: AoV Vmax:      1.82 m/s  (<=1.7m/s) AoV Peak P.2 mmHg (<20mmHg) LVOT Max Philipp:  1.42 m/s  (<=1.1m/s) LVOT VTI:      18.80 cm LVOT Diameter: 2.00 cm   (1.8-2.4cm) AoV Area,Vmax: 2.45 cm2  (2.5-4.5cm2)  RIGHT VENTRICLE: TAPSE: 15.8 mm RV s'  0.18 m/s TRICUSPID VALVE/RVSP:                             Normal Ranges: Peak TR Velocity: 2.90 m/s RV Syst Pressure: 36.6 mmHg (< 30mmHg) PULMONIC VALVE:                      Normal Ranges: PV Max Philipp: 0.9 m/s  (0.6-0.9m/s) PV Max PG:  3.2 mmHg  [id number] Delvis Miller MD Electronically signed on 2024 at 5:46:15 PM  ** Final **     XR abdomen 1 view    Result Date: 2024  Interpreted By:  Jovany Nuñez, STUDY: XR ABDOMEN 1 VIEW;  2024 12:58 pm   INDICATION: Signs/Symptoms:post ngt.   COMPARISON: One-view abdomen 2024 13 a.m.   ACCESSION NUMBER(S): [id number]   ORDERING CLINICIAN: SUN CHAMPAGNE   FINDINGS: One-view abdomen   An enteric tube is positioned within the region of the gastric fundus several cm below the expected gastroesophageal junction. There is a predominantly fluid-filled small bowel and colon pattern. There is less gastric distention with air.   Opacification of the left thorax and multiple air bronchograms are noted.       1.  The enteric tube is positioned within the gastric  fundus 2. Advancement of the tube would be recommended for more optimal positioning 3. Predominantly fluid-filled small bowel and colon pattern 4. Opacification of the visualized left thorax and multiple air bronchograms within the left lung   MACRO: None   Signed by: Jovany Nuñez 5/21/2024 2:19 PM Dictation workstation:   [id number]    XR abdomen 1 view    Result Date: 5/21/2024  Interpreted By:  Jovany Nuñez, STUDY: XR ABDOMEN 1 VIEW;  5/21/2024 11:13 am   INDICATION: Signs/Symptoms:ABD DISTENTION.   COMPARISON: None.   ACCESSION NUMBER(S): [id number]   ORDERING CLINICIAN: SUN CHAMPAGNE   FINDINGS: The stomach is distended with air. There is a predominantly fluid-filled small bowel and colon pattern. Gas is demonstrated within the colon and distal ileum. The left ventricular pacemaker electrode appears in adequate position. The left ventricle appears mildly enlarged. Osseous and articular anatomy is normal for age.       1.  The stomach is moderately distended with air otherwise, nonspecific predominantly fluid-filled bowel pattern   MACRO: None   Signed by: Jovany Nuñez 5/21/2024 2:17 PM Dictation workstation:   [id number]    XR chest 1 view    Result Date: 5/21/2024  Interpreted By:  Jovany Nuñez  and Guicho Castro STUDY: XR CHEST 1 VIEW;  5/21/2024 6:14 am   INDICATION: Signs/Symptoms:SOB.   COMPARISON: Chest radiograph 05/16/2024 CT chest 05/18/2024   ACCESSION NUMBER(S): [id number]   ORDERING CLINICIAN: SUN CHAMPAGNE   FINDINGS: AP radiograph of the chest was provided.   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   CARDIOMEDIASTINAL SILHOUETTE: Cardiomediastinal silhouette is stable in size with complete obscuration of the left cardiomediastinal border.   LUNGS: Persistent near-complete opacification of the left hemithorax with interval reduced air bronchograms compared to prior. Persistent patchy right infrahilar  airspace opacities are noted. Prominent perihilar and interstitial lung markings are noted on the right. No consolidation, effusion, or pneumothorax on the right.   ABDOMEN: No remarkable upper abdominal findings.   BONES: No acute osseous changes.       1. Virtually complete opacification of the left hemithorax with interval reduced air bronchograms. 2. Persistent patchy right infrahilar airspace opacities which may reflect aspiration changes and/or pneumonia in the appropriate clinical setting.   I personally reviewed the images/study and I agree with the findings as stated by Matthew Lindo MD. This study was interpreted at [facility], [place], [place].   MACRO: None   Signed by: Jovany Nuñez 5/21/2024 11:26 AM Dictation workstation:   [id number]

## 2024-06-19 NOTE — DISCHARGE SUMMARY
Discharge Diagnosis  Cellulitis of scrotum    Issues Requiring Follow-Up      Discharge Meds     Your medication list        START taking these medications        Instructions Last Dose Given Next Dose Due   amoxicillin-pot clavulanate 500-125 mg tablet  Commonly known as: Augmentin      Take 1 tablet by mouth 2 times a day for 10 days.              CONTINUE taking these medications        Instructions Last Dose Given Next Dose Due   albuterol 2.5 mg /3 mL (0.083 %) nebulizer solution      Take 3 mL (2.5 mg) by nebulization every 6 hours if needed for wheezing.       atorvastatin 40 mg tablet  Commonly known as: Lipitor           benzonatate 100 mg capsule  Commonly known as: Tessalon      Take 1 capsule (100 mg) by mouth 3 times a day as needed for cough. Do not crush or chew.       carboxymethylcellulose 1 % ophthalmic solution dropperette  Commonly known as: Refresh Celluvisc           diclofenac sodium 1 % gel  Commonly known as: Voltaren      Apply 4.5 inches (4 g) topically 4 times a day as needed (pain).       ferrous sulfate 325 (65 Fe) MG EC tablet           ipratropium-albuteroL 0.5-2.5 mg/3 mL nebulizer solution  Commonly known as: Duo-Neb           melatonin 5 mg tablet           metoprolol succinate XL 50 mg 24 hr tablet  Commonly known as: Toprol-XL      Take 3 tablets (150 mg) by mouth once daily. Do not crush or chew.       nitroglycerin 0.4 mg SL tablet  Commonly known as: Nitrostat           ondansetron 4 mg tablet  Commonly known as: Zofran      Take 1 tablet (4 mg) by mouth every 8 hours if needed for nausea.       oxygen gas therapy  Commonly known as: O2      Inhale 1 each every 12 hours.       pantoprazole 40 mg EC tablet  Commonly known as: ProtoNix      Take 1 tablet (40 mg) by mouth 2 times a day before meals. Do not crush, chew, or split.       promethazine-DM 6.25-15 mg/5 mL syrup  Commonly known as: Phenergan-DM           tamsulosin 0.4 mg 24 hr capsule  Commonly known as: Flomax       Take 1 capsule (0.4 mg) by mouth once daily.       traZODone 50 mg tablet  Commonly known as: Desyrel           zinc sulfate 220 (50 Zn) MG capsule  Commonly known as: Zincate                  STOP taking these medications      ascorbic acid 1,000 mg tablet  Commonly known as: Vitamin C        omega-3 fatty acids-fish oil 360-1,200 mg capsule                  Where to Get Your Medications        These medications were sent to [facility]/pharmacy #[id number] - [place], [place] - [address] [place] [id number]      Phone: [phone]   amoxicillin-pot clavulanate 500-125 mg tablet         Test Results Pending At Discharge  Pending Labs       No current pending labs.            Hospital Course   89-year-old male with past medical history of mesothelioma, atrial fibrillation, hypertension, recurrent pleural effusion, atrial fibrillation who was admitted with abdominal wall/scrotal cellulitis.  He was treated with vancomycin, Flagyl and cefepime cellulitis seem to be improving but his creatinine has been worsening, patient expressed his wishes multiple times to he does not want to come to hospital and wants to be at home and comfortable.  Family met with hospice and decided to go home with hospice.  I will discharge him on Augmentin for 10 days to cover for cellulitis.  Patient appears comfortable.    Pertinent Physical Exam At Time of Discharge  Physical Exam  HEENT: NCAT, PERRLA, EOMI, moist mucous membranes  NECK: supple, no masses  CV: irregularly irregular, no m/r/g, no LE edema  LUNGS: diminished bilaterally, rhonchi,   ABD: soft, NT, ND, NBS  SKIN: L abdominal wall erythema, improving,   : L scrotal wall cellulitis,   MSK; no gross deformities, normal joints; pitting edema bilaterally  NEURO: A+Ox3, no FND  Outpatient Follow-Up  Future Appointments   Date Time Provider Department Center   6/25/2024  2:30 PM Tina Henderson MD [id number] [place]   7/8/2024  2:00 PM Mariely Ann MD  [id number] [place]   8/16/2024  1:00 PM Tina Henderson MD [id number] [place]         Abraham Pugh MD

## 2024-06-19 NOTE — PROGRESS NOTES
Vancomycin Dosing by Pharmacy- FOLLOW UP    Vinicius Arriola is a 89 y.o. year old male who Pharmacy has been consulted for vancomycin dosing for cellulitis, skin and soft tissue. Based on the patient's indication and renal status this patient is being dosed based on a goal trough/random level of 15-20.     Renal function is currently scr=3.67 cl=17.7 ml/min  Current vancomycin dose: 500 mg given every 24 hours    Most recent trough level: 14.5 mcg/mL    Visit Vitals  /62   Pulse (!) 125   Temp 37.3 °C (99.1 °F)   Resp 20        Lab Results   Component Value Date    CREATININE 3.67 (H) 2024    CREATININE 3.62 (H) 2024    CREATININE 3.31 (H) 2024    CREATININE 3.22 (H) 2024        Patient weight is as follows:   Vitals:    24 1408   Weight: 118 kg (260 lb)       Cultures:  No results found for the encounter in last 14 days.       I/O last 3 completed shifts:  In: 600 (5.1 mL/kg) [IV Piggyback:600]  Out: 1700 (14.4 mL/kg) [Urine:1700 (0.4 mL/kg/hr)]  Weight: 117.9 kg   I/O during current shift:  I/O this shift:  In: 200 [IV Piggyback:200]  Out: -     Temp (24hrs), Av.2 °C (99 °F), Min:36.9 °C (98.4 °F), Max:37.5 °C (99.5 °F)      Assessment/Plan    Within goal random/trough level, continue current dose, with subsequent dosing based off levels.    The next level will be obtained on  0900. May be obtained sooner if clinically indicated.   Will continue to monitor renal function daily while on vancomycin and order serum creatinine at least every 48 hours if not already ordered.  Follow for continued vancomycin needs, clinical response, and signs/symptoms of toxicity.       Mae Wells, PharmD

## 2024-06-20 NOTE — DOCUMENTATION CLARIFICATION NOTE
"    PATIENT:               ZAKIYA LAWSON  ACCT #:                  [id number]  MRN:                       [id number]  :                       1935  ADMIT DATE:       2024 2:05 PM  DISCH DATE:        2024 3:55 PM  RESPONDING PROVIDER #:        [id number]          PROVIDER RESPONSE TEXT:    Metabolic encephalopathy 2/2 scrotal cellulitis    CDI QUERY TEXT:    Clarification    Instruction:    Based on your assessment of the patient and the clinical information, please provide the requested documentation by clicking on the appropriate radio button and enter any additional information if prompted.    Question: Please further clarify the most likely etiology of the altered mental status as    When answering this query, please exercise your independent professional judgment. The fact that a question is being asked, does not imply that any particular answer is desired or expected.    The patient's clinical indicators include:  Clinical Information:  The patient is an 89 year old male who presented to the ED with scrotal swelling.  He was found to have cellulitis of the scrotum and abdomen.  His PMH includes HTN, CHF, pAF, COPD, morbid obesity, HLD, polycythemia vera, BPH.    Clinical Indicators:    ED Note  \"Lethargic, ill-appearing.\"  \"Lethargic, nonparticipatory with exam.\"    Urology Consult 6/15 \"Patient is very obtunded short of breath considerable abdominal swelling and edema with induration and anasarca\"    Medicine PN 6/15 \"AO x3, no FND.\"    Treatment:  IV Flagyl, Cefepime, Vancomycin, IV Zosyn x1    Risk Factors:  Scrotal cellulitis with possible abscess  Options provided:  -- Metabolic encephalopathy 2/2 scrotal cellulitis  -- Other - I will add my own diagnosis  -- Refer to Clinical Documentation Reviewer    Query created by: Shey Leija on 2024 12:26 PM      Electronically signed by:  ARLET LEE MD 2024 3:57 PM          "

## 2024-06-20 NOTE — DOCUMENTATION CLARIFICATION NOTE
"    PATIENT:               ZAKIYA LAWSON  ACCT #:                  [id number]  MRN:                       [id number]  :                       1935  ADMIT DATE:       2024 2:05 PM  DISCH DATE:        2024 3:55 PM  RESPONDING PROVIDER #:        [id number]          PROVIDER RESPONSE TEXT:    Acute on Chronic Respiratory Failure as evidenced by being on 3l oxygen    CDI QUERY TEXT:    Clarification    Instruction:    Based on your assessment of the patient and the clinical information, please provide the requested documentation by clicking on the appropriate radio button and enter any additional information if prompted.    Question: Please clarify diagnosis of respiratory failure as    When answering this query, please exercise your independent professional judgment. The fact that a question is being asked, does not imply that any particular answer is desired or expected.    The patient's clinical indicators include:  Clinical Information:  The patient is an 89 year old male who presented to the ED with scrotal swelling.  He was found to have cellulitis of the scrotum and abdomen.  His PMH includes HTN, CHF, pAF, COPD, morbid obesity, HLD, polycythemia vera, BPH.    Documented Diagnosis:  \"Acute on chronic respiratory failure with hypoxia\" is documented on the   by Dr. Sotelo.    HP  \"Pulmonary consult for further recommendation about opacification of the left lung and right pleural effusion.\"    Pulmonary Consult  \"Acute on chronic respiratory failure with hypoxia.\"  \"Advanced history of mesothelioma with completely whiteout left lung pulm decortication and surgery and thoracentesis in the past.  Patient recently had clinical trial of chemo and patient did not tolerate.  No further plan for advanced mesothelioma.\"    Chart review shows that pt was prescribed oxygen on 24, cannot determine how many liters    Clinical Indicators and Documentation:  -Vital Signs:  36.1, , 17-19, SBP " ", 94-99 percent on 3L  -ABG results:  n/a  -Physical Exam Findings:  Per ED Note 6/14  -Breath sounds:  \"Normal breath sounds.  No stridor.  No wheezing.'  -Distress:  \"Pulmonary effort is normal.  No respiratory distress.\"  -Cyanosis:  None noted  -Oxygen Therapy: Pt on 3L throughout admission  -Pulmonary Consult:  Yes    Treatment:  3L O2, Pulmonary Consult    Risk Factors:  Mesothelioma with mets, hx smoking  Options provided:  -- Acute Respiratory Failure ruled out after further workup, pt has chronic respiratory failure only  -- Acute on Chronic Respiratory Failure as evidenced by, Please specify additional information below  -- Other - I will add my own diagnosis  -- Refer to Clinical Documentation Reviewer    Query created by: Shey Leija on 6/20/2024 12:27 PM      Electronically signed by:  ARLET LEE MD 6/20/2024 3:57 PM          "

## 2024-06-20 NOTE — DOCUMENTATION CLARIFICATION NOTE
"    PATIENT:               ZAKIYA LAWSON  ACCT #:                  [id number]  MRN:                       [id number]  :                       1935  ADMIT DATE:       2024 2:05 PM  DISCH DATE:        2024 3:55 PM  RESPONDING PROVIDER #:        [id number]          PROVIDER RESPONSE TEXT:    PNA ruled out    CDI QUERY TEXT:    Clarification    Instruction:    Based on your assessment of the patient and the clinical information, please provide the requested documentation by clicking on the appropriate radio button and enter any additional information if prompted.    Question: Please further specify the type of pneumonia being treated    When answering this query, please exercise your independent professional judgment. The fact that a question is being asked, does not imply that any particular answer is desired or expected.    The patient's clinical indicators include:  Clinical Information:  The patient is an 89 year old male who presented to the ED with scrotal swelling.  He was found to have cellulitis of the scrotum and abdomen.  His PMH includes HTN, CHF, pAF, COPD, morbid obesity, HLD, polycythemia vera, BPH.    Clinical Indicators:    ED Note  \"Decreased breath sounds lower fields bilaterally.\"  \"Patient's chest x-ray is consistent with complete opacification with volume loss of left hemithorax increased density right lung base reflecting small to moderate right pleural effusion superimposed pneumonia cannot be excluded.\"  \"community-acquired pneumonia.\"    HP  \"Superimposed pneumonia cannot be excluded\"    EP Consult 6/15 \"Looking at his records he was admitted few weeks ago at [facility].\"    Urology Consult 6/15 \"Numerous significant medical comorbidities including advanced mesothelioma with metastatic disease\"    Medicine PN 6/15 \"prolonged [place] at [facility] [place]ology [place]; currently no plans for further chemotherapy, limited by his functional reserve and " "multiple chronic conditions\"    CXR 6/14 \"Complete opacification with volume loss left hemithorax. Increased density right lung base reflecting small to moderate right pleural effusion. Superimposed pneumonia can not be excluded.\"    CT AP 6/14 \"There is some persistent volume loss in the left lower lobe with persistent consolidation. There are few air bronchograms. There is rounded collection of fluid at the anterior aspect of this process which may relate to either necrotizing pneumonia or loculated pleural fluid or conceivably pulmonary abscess. However this is also unchanged from the prior. There is a however, a new moderate dependent layering right pleural effusion.\"    Treatment:  IV Cefepime 1g BID 6/14-6/17, IV Flagyl 500mg TID 6/15-6/17, IV Zosyn 4.5g on 6/14, IV Vancomycin 500mg daily    Risk Factors:  Recent hospitalization at [facility], mesothelioma with mets  Options provided:  -- Gram Negative PNA ruled in  -- PNA ruled out  -- Other - I will add my own diagnosis  -- Refer to Clinical Documentation Reviewer    Query created by: Shey Leija on 6/20/2024 12:26 PM      Electronically signed by:  ARLET LEE MD 6/20/2024 3:57 PM          "

## 2024-06-25 ENCOUNTER — APPOINTMENT (OUTPATIENT)
Dept: CARDIOLOGY | Facility: CLINIC | Age: 89
End: 2024-06-25
Payer: MEDICARE

## 2024-07-08 ENCOUNTER — APPOINTMENT (OUTPATIENT)
Dept: HEMATOLOGY/ONCOLOGY | Facility: CLINIC | Age: 89
End: 2024-07-08
Payer: MEDICARE

## 2024-08-16 ENCOUNTER — APPOINTMENT (OUTPATIENT)
Dept: CARDIOLOGY | Facility: CLINIC | Age: 89
End: 2024-08-16
Payer: MEDICARE

## (undated) DEVICE — APPLICATOR MEDICATED 26 CC SOLUTION HI LT ORNG CHLORAPREP

## (undated) DEVICE — GLOVE ORANGE PI 7 1/2   MSG9075

## (undated) DEVICE — SYRINGE IRRIG 60ML SFT PLIABLE BLB EZ TO GRP 1 HND USE W/

## (undated) DEVICE — SINGLE PORT MANIFOLD: Brand: NEPTUNE 2

## (undated) DEVICE — SUTURE VCRL SZ 3-0 L27IN ABSRB UD L26MM SH 1/2 CIR J416H

## (undated) DEVICE — SUTURE VCRL SZ 4-0 L18IN ABSRB UD L19MM PS-2 3/8 CIR PRIM J496H

## (undated) DEVICE — SUTURE VCRL SZ 3-0 L36IN ABSRB UD L36MM CT-1 1/2 CIR J944H

## (undated) DEVICE — DRAPE EQUIP TRNSPRT CONTAINMENT FOR BK TAB

## (undated) DEVICE — 3M™ TEGADERM™ TRANSPARENT FILM DRESSING FRAME STYLE, 1626W, 4 IN X 4-3/4 IN (10 CM X 12 CM), 50/CT 4CT/CASE: Brand: 3M™ TEGADERM™

## (undated) DEVICE — COVER LT HNDL BLU PLAS

## (undated) DEVICE — TOWEL,OR,DSP,ST,BLUE,STD,4/PK,20PK/CS: Brand: MEDLINE

## (undated) DEVICE — DRESSING, GAUZE, SPONGE, 12 PLY, 4 X 4 IN, PLASTIC POUCH, STRL 10PK

## (undated) DEVICE — BLADE, GEN COATED 2.75, LF

## (undated) DEVICE — 1010 S-DRAPE TOWEL DRAPE 10/BX: Brand: STERI-DRAPE™

## (undated) DEVICE — KIT EVAC 400CC PVC RADPQ Y CONN

## (undated) DEVICE — MARKER SURG SKIN GENTIAN VLT REG TIP W/ 6IN RUL

## (undated) DEVICE — HYPODERMIC SAFETY NEEDLE: Brand: MAGELLAN

## (undated) DEVICE — AGENT HEMOSTATIC SURGIFLOW MATRIX KIT W/THROMBIN

## (undated) DEVICE — SUTURE, ETHILON, 4-0, BLK, MONO, PS-2 18

## (undated) DEVICE — GAUZE,SPONGE,4"X4",16PLY,XRAY,STRL,LF: Brand: MEDLINE

## (undated) DEVICE — SYRINGE MED 10ML LUERLOCK TIP W/O SFTY DISP

## (undated) DEVICE — BANDAGE, ESMARK 4 IN X 9 FT, STERILE

## (undated) DEVICE — SYRINGE, 60 CC, IRRIGATION, BULB, CONTRO-BULB, PAPER POUCH

## (undated) DEVICE — ELECTRODE PT RET AD L9FT HI MOIST COND ADH HYDRGEL CORDED

## (undated) DEVICE — SPONGE,LAP,18"X18",DLX,XR,ST,5/PK,40/PK: Brand: MEDLINE

## (undated) DEVICE — NEEDLE HYPO 25GA L1.5IN BLU POLYPR HUB S STL REG BVL STR

## (undated) DEVICE — GLOVE, SURGICAL, PROTEXIS PI , 7.0, PF, LF

## (undated) DEVICE — CORD,CAUTERY,BIPOLAR,STERILE: Brand: MEDLINE

## (undated) DEVICE — GOWN,AURORA,NONREINFORCED,LARGE: Brand: MEDLINE

## (undated) DEVICE — 3M™ IOBAN™ 2 ANTIMICROBIAL INCISE DRAPE 6650EZ: Brand: IOBAN™ 2

## (undated) DEVICE — NEPTUNE E-SEP SMOKE EVACUATION PENCIL, COATED, 70MM BLADE, PUSH BUTTON SWITCH: Brand: NEPTUNE E-SEP

## (undated) DEVICE — PADDING, CAST, SPECIALIST, 3 IN X 4 YD, STERILE

## (undated) DEVICE — SHEET,DRAPE,53X77,STERILE: Brand: MEDLINE

## (undated) DEVICE — 3.0MM PRECISION NEURO (MATCH HEAD)

## (undated) DEVICE — CONMED SCOPE SAVER BITE BLOCK, 20X27 MM: Brand: SCOPE SAVER

## (undated) DEVICE — STRAP, ARM BOARD, 32 X 1.5

## (undated) DEVICE — TUBING, SUCTION, 1/4" X 10', STRAIGHT: Brand: MEDLINE

## (undated) DEVICE — NEEDLE, SAFETY, 25 GA X 1.5 IN

## (undated) DEVICE — ALCON SURGICAL BLADE 64: Brand: ALCON

## (undated) DEVICE — DEVICE NEUROSTIMULATOR W2.9XL3.1IN THK0.8IN 71GM

## (undated) DEVICE — SUTURE VCRL SZ 0 L27IN ABSRB UD L26MM CP-2 1/2 CIR SGL J870H

## (undated) DEVICE — SOLUTION, IRRIGATION, STERILE WATER, 1000 ML, POUR BOTTLE

## (undated) DEVICE — KIT ARMOR C DRP COLLAPSIBLE AND SELF EXP TOP CVR FOR FLUOROSCOPIC

## (undated) DEVICE — PACK,LAPAROTOMY,NO GOWNS: Brand: MEDLINE

## (undated) DEVICE — COUNTER NDL 40 COUNT HLD 70 FOAM BLK ADH W/ MAG

## (undated) DEVICE — SUTURE VCRL + SZ 3-0 L18IN ABSRB UD PS-2 3/8 CIR REV CUT VCP497H

## (undated) DEVICE — CODMAN® SURGICAL PATTIES 1/2" X 1/2" (1.27CM X 1.27CM): Brand: CODMAN®

## (undated) DEVICE — DRAIN SURG 10FR 100% SIL RND END PERF W/ TRCR

## (undated) DEVICE — GLOVE, SURGICAL, PROTEXIS PI BLUE W/NEUTHERA, 7.0, PF, LF

## (undated) DEVICE — LABEL MED MINI W/ MARKER

## (undated) DEVICE — SYRINGE MED 30ML STD CLR PLAS LUERLOCK TIP N CTRL DISP

## (undated) DEVICE — WAX SURG 2.5GM HEMSTAT BNE BEESWAX PARAFFIN ISO PALMITATE

## (undated) DEVICE — BLUE LEAD EXTENSION KIT, 60CM: Brand: NEVRO®

## (undated) DEVICE — NEEDLE SPINAL 22GA L3.5IN SPINOCAN

## (undated) DEVICE — BLADE ES ELASTOMERIC COAT INSUL DURABLE BEND UPTO 90DEG

## (undated) DEVICE — 3M™ TEGADERM™ TRANSPARENT FILM DRESSING FRAME STYLE, 1624W, 2-3/8 IN X 2-3/4 IN (6 CM X 7 CM), 100/CT 4CT/CASE: Brand: 3M™ TEGADERM™

## (undated) DEVICE — DRAPE, C-ARM, BANDS, 41X120: Brand: MEDLINE

## (undated) DEVICE — KAIRISON TUBING SET PNEUMATIC, (3000 MM), STERILE, DISPOSABLE, TO BE USED WITH: FK898R, PACKAGE OF 10 PIECES: Brand: KAIRISON

## (undated) DEVICE — SUTURE VCRL SZ 2-0 L27IN ABSRB UD L36MM CP-1 1/2 CIR REV J266H

## (undated) DEVICE — ADHESIVE SKIN CLSR 0.7ML TOP DERMBND ADV

## (undated) DEVICE — BLADE,CARBON-STEEL,15,STRL,DISPOSABLE,TB: Brand: MEDLINE

## (undated) DEVICE — GLOVE ORANGE PI 8   MSG9080

## (undated) DEVICE — 3M™ STERI-DRAPE™ INSTRUMENT POUCH 1018: Brand: STERI-DRAPE™

## (undated) DEVICE — C-ARM: Brand: UNBRANDED

## (undated) DEVICE — INTENDED FOR TISSUE SEPARATION, AND OTHER PROCEDURES THAT REQUIRE A SHARP SURGICAL BLADE TO PUNCTURE OR CUT.: Brand: BARD-PARKER ® CARBON RIB-BACK BLADES

## (undated) DEVICE — DRESSING, GAUZE, PETROLATUM, STRIP, XEROFORM, 1 X 8 IN, STERILE

## (undated) DEVICE — MANIFOLD, 4 PORT NEPTUNE STANDARD

## (undated) DEVICE — BANDAGE, ELASTIC, MATRIX, SELF-CLOSURE, 3 IN X 5 YD, LF

## (undated) DEVICE — COVER MICSCP W46XL120IN 4 BINOC GLS LENS LEICA

## (undated) DEVICE — ENDO CARRY-ON PROCEDURE KIT: Brand: ENDO CARRY-ON PROCEDURE KIT

## (undated) DEVICE — SYRINGE, 10 CC, LUER LOCK

## (undated) DEVICE — SUTURE PERMA-HAND SZ 2-0 L30IN NONABSORBABLE BLK L26MM SH K833H

## (undated) DEVICE — TAPE, SILK, DURAPORE, 1 IN X 10 YD, LF

## (undated) DEVICE — 3M™ STERI-STRIP™ REINFORCED ADHESIVE SKIN CLOSURES, R1547, 1/2 IN X 4 IN (12 MM X 100 MM), 6 STRIPS/ENVELOPE: Brand: 3M™ STERI-STRIP™

## (undated) DEVICE — GLOVE, SURGICAL, PROTEXIS PI , 7.5, PF, LF

## (undated) DEVICE — KIT LD L60CM 8 ELECTRD PERC COMP CONTAIN LD ANCHR GWIRE NDL: Type: IMPLANTABLE DEVICE | Site: BACK | Status: NON-FUNCTIONAL

## (undated) DEVICE — PREP, IODOPHOR, W/ALCOHOL, DURAPREP, W/APPLICATOR, 26 CC

## (undated) DEVICE — TOWELS 4-PK

## (undated) DEVICE — APPLICATOR MEDICATED 10.5 CC SOLUTION HI LT ORNG CHLORAPREP

## (undated) DEVICE — TUBE SET 96 MM 64 MM H2O PERISTALTIC STD AUX CHANNEL

## (undated) DEVICE — GAUZE,SPONGE,2"X2",8PLY,STERILE,LF,2'S: Brand: MEDLINE

## (undated) DEVICE — Device

## (undated) DEVICE — TUBING, SUCTION, 9/32" X 20', STRAIGHT: Brand: MEDLINE INDUSTRIES, INC.

## (undated) DEVICE — CODMAN® SURGICAL PATTIES 1/2" X 3" (1.27CM X 7.62CM): Brand: CODMAN®

## (undated) DEVICE — DRAPE, SHEET, 17 X 23 IN

## (undated) DEVICE — CATHETER IV 16 GAX2 IN STR INTROCAN SAFETY

## (undated) DEVICE — STRAP, VELCRO, BODY, 4 X 60IN, NS

## (undated) DEVICE — GOWN, SURGICAL, ROYAL SILK, XL, STERILE

## (undated) DEVICE — BRUSH ENDO CLN L90.5IN SHTH DIA1.7MM BRIST DIA5-7MM 2-6MM

## (undated) DEVICE — SOLUTION, IRRIGATION, X RX SODIUM CHL 0.9%, 1000ML BTL

## (undated) DEVICE — Device: Brand: ENDO SMARTCAP

## (undated) DEVICE — SYRINGE MED 8ML PLAS LOSS OF RESISTANCE SYR LUERSLIP

## (undated) DEVICE — KIT LD L60CM 8 ELECTRD PERC COMP CONTAIN LD ANCHR GWIRE NDL: Type: IMPLANTABLE DEVICE | Site: SPINE LUMBAR | Status: NON-FUNCTIONAL